# Patient Record
Sex: FEMALE | Race: BLACK OR AFRICAN AMERICAN | NOT HISPANIC OR LATINO | URBAN - METROPOLITAN AREA
[De-identification: names, ages, dates, MRNs, and addresses within clinical notes are randomized per-mention and may not be internally consistent; named-entity substitution may affect disease eponyms.]

---

## 2017-04-04 ENCOUNTER — INPATIENT (INPATIENT)
Facility: HOSPITAL | Age: 62
LOS: 3 days | Discharge: HOME | DRG: 390 | End: 2017-04-08
Attending: SURGERY | Admitting: SURGERY
Payer: MEDICAID

## 2017-04-04 VITALS
TEMPERATURE: 98 F | HEIGHT: 66 IN | WEIGHT: 139.99 LBS | OXYGEN SATURATION: 99 % | HEART RATE: 76 BPM | SYSTOLIC BLOOD PRESSURE: 162 MMHG | DIASTOLIC BLOOD PRESSURE: 90 MMHG | RESPIRATION RATE: 18 BRPM

## 2017-04-04 DIAGNOSIS — Z98.890 OTHER SPECIFIED POSTPROCEDURAL STATES: Chronic | ICD-10-CM

## 2017-04-04 DIAGNOSIS — K56.60 UNSPECIFIED INTESTINAL OBSTRUCTION: ICD-10-CM

## 2017-04-04 DIAGNOSIS — K56.69 OTHER INTESTINAL OBSTRUCTION: ICD-10-CM

## 2017-04-04 LAB
ALBUMIN SERPL ELPH-MCNC: 3.7 G/DL — SIGNIFICANT CHANGE UP (ref 3.5–5)
ALP SERPL-CCNC: 51 U/L — SIGNIFICANT CHANGE UP (ref 40–120)
ALT FLD-CCNC: 20 U/L DA — SIGNIFICANT CHANGE UP (ref 10–60)
ANION GAP SERPL CALC-SCNC: 8 MMOL/L — SIGNIFICANT CHANGE UP (ref 5–17)
APTT BLD: 31 SEC — SIGNIFICANT CHANGE UP (ref 27.5–37.4)
AST SERPL-CCNC: 15 U/L — SIGNIFICANT CHANGE UP (ref 10–40)
BASOPHILS # BLD AUTO: 0.1 K/UL — SIGNIFICANT CHANGE UP (ref 0–0.2)
BASOPHILS NFR BLD AUTO: 1.8 % — SIGNIFICANT CHANGE UP (ref 0–2)
BILIRUB SERPL-MCNC: 0.6 MG/DL — SIGNIFICANT CHANGE UP (ref 0.2–1.2)
BUN SERPL-MCNC: 9 MG/DL — SIGNIFICANT CHANGE UP (ref 7–18)
CALCIUM SERPL-MCNC: 8.9 MG/DL — SIGNIFICANT CHANGE UP (ref 8.4–10.5)
CHLORIDE SERPL-SCNC: 104 MMOL/L — SIGNIFICANT CHANGE UP (ref 96–108)
CO2 SERPL-SCNC: 26 MMOL/L — SIGNIFICANT CHANGE UP (ref 22–31)
CREAT SERPL-MCNC: 0.83 MG/DL — SIGNIFICANT CHANGE UP (ref 0.5–1.3)
EOSINOPHIL # BLD AUTO: 0.1 K/UL — SIGNIFICANT CHANGE UP (ref 0–0.5)
EOSINOPHIL NFR BLD AUTO: 1.7 % — SIGNIFICANT CHANGE UP (ref 0–6)
GLUCOSE SERPL-MCNC: 93 MG/DL — SIGNIFICANT CHANGE UP (ref 70–99)
HCT VFR BLD CALC: 37.5 % — SIGNIFICANT CHANGE UP (ref 34.5–45)
HGB BLD-MCNC: 11.8 G/DL — SIGNIFICANT CHANGE UP (ref 11.5–15.5)
INR BLD: 1.07 RATIO — SIGNIFICANT CHANGE UP (ref 0.88–1.16)
LIDOCAIN IGE QN: 115 U/L — SIGNIFICANT CHANGE UP (ref 73–393)
LYMPHOCYTES # BLD AUTO: 1.8 K/UL — SIGNIFICANT CHANGE UP (ref 1–3.3)
LYMPHOCYTES # BLD AUTO: 29.1 % — SIGNIFICANT CHANGE UP (ref 13–44)
MCHC RBC-ENTMCNC: 28.4 PG — SIGNIFICANT CHANGE UP (ref 27–34)
MCHC RBC-ENTMCNC: 31.6 GM/DL — LOW (ref 32–36)
MCV RBC AUTO: 90.1 FL — SIGNIFICANT CHANGE UP (ref 80–100)
MONOCYTES # BLD AUTO: 0.7 K/UL — SIGNIFICANT CHANGE UP (ref 0–0.9)
MONOCYTES NFR BLD AUTO: 11.5 % — SIGNIFICANT CHANGE UP (ref 2–14)
NEUTROPHILS # BLD AUTO: 3.5 K/UL — SIGNIFICANT CHANGE UP (ref 1.8–7.4)
NEUTROPHILS NFR BLD AUTO: 55.9 % — SIGNIFICANT CHANGE UP (ref 43–77)
PLATELET # BLD AUTO: 417 K/UL — HIGH (ref 150–400)
POTASSIUM SERPL-MCNC: 4.4 MMOL/L — SIGNIFICANT CHANGE UP (ref 3.5–5.3)
POTASSIUM SERPL-SCNC: 4.4 MMOL/L — SIGNIFICANT CHANGE UP (ref 3.5–5.3)
PROT SERPL-MCNC: 7.9 G/DL — SIGNIFICANT CHANGE UP (ref 6–8.3)
PROTHROM AB SERPL-ACNC: 11.7 SEC — SIGNIFICANT CHANGE UP (ref 9.8–12.7)
RBC # BLD: 4.16 M/UL — SIGNIFICANT CHANGE UP (ref 3.8–5.2)
RBC # FLD: 14.7 % — HIGH (ref 10.3–14.5)
SODIUM SERPL-SCNC: 138 MMOL/L — SIGNIFICANT CHANGE UP (ref 135–145)
WBC # BLD: 6.2 K/UL — SIGNIFICANT CHANGE UP (ref 3.8–10.5)
WBC # FLD AUTO: 6.2 K/UL — SIGNIFICANT CHANGE UP (ref 3.8–10.5)

## 2017-04-04 PROCEDURE — 74176 CT ABD & PELVIS W/O CONTRAST: CPT | Mod: 26

## 2017-04-04 PROCEDURE — 71010: CPT | Mod: 26

## 2017-04-04 PROCEDURE — 99285 EMERGENCY DEPT VISIT HI MDM: CPT

## 2017-04-04 RX ORDER — ONDANSETRON 8 MG/1
4 TABLET, FILM COATED ORAL EVERY 6 HOURS
Qty: 0 | Refills: 0 | Status: DISCONTINUED | OUTPATIENT
Start: 2017-04-04 | End: 2017-04-08

## 2017-04-04 RX ORDER — SODIUM CHLORIDE 9 MG/ML
1000 INJECTION, SOLUTION INTRAVENOUS
Qty: 0 | Refills: 0 | Status: DISCONTINUED | OUTPATIENT
Start: 2017-04-04 | End: 2017-04-08

## 2017-04-04 RX ORDER — SODIUM CHLORIDE 9 MG/ML
3 INJECTION INTRAMUSCULAR; INTRAVENOUS; SUBCUTANEOUS ONCE
Qty: 0 | Refills: 0 | Status: COMPLETED | OUTPATIENT
Start: 2017-04-04 | End: 2017-04-04

## 2017-04-04 RX ORDER — SODIUM CHLORIDE 9 MG/ML
1000 INJECTION INTRAMUSCULAR; INTRAVENOUS; SUBCUTANEOUS ONCE
Qty: 0 | Refills: 0 | Status: COMPLETED | OUTPATIENT
Start: 2017-04-04 | End: 2017-04-04

## 2017-04-04 RX ORDER — HEPARIN SODIUM 5000 [USP'U]/ML
5000 INJECTION INTRAVENOUS; SUBCUTANEOUS EVERY 8 HOURS
Qty: 0 | Refills: 0 | Status: DISCONTINUED | OUTPATIENT
Start: 2017-04-04 | End: 2017-04-08

## 2017-04-04 RX ORDER — ONDANSETRON 8 MG/1
4 TABLET, FILM COATED ORAL ONCE
Qty: 0 | Refills: 0 | Status: COMPLETED | OUTPATIENT
Start: 2017-04-04 | End: 2017-04-04

## 2017-04-04 RX ORDER — PANTOPRAZOLE SODIUM 20 MG/1
40 TABLET, DELAYED RELEASE ORAL DAILY
Qty: 0 | Refills: 0 | Status: DISCONTINUED | OUTPATIENT
Start: 2017-04-04 | End: 2017-04-08

## 2017-04-04 RX ADMIN — SODIUM CHLORIDE 3 MILLILITER(S): 9 INJECTION INTRAMUSCULAR; INTRAVENOUS; SUBCUTANEOUS at 18:42

## 2017-04-04 RX ADMIN — SODIUM CHLORIDE 1000 MILLILITER(S): 9 INJECTION INTRAMUSCULAR; INTRAVENOUS; SUBCUTANEOUS at 20:05

## 2017-04-04 RX ADMIN — ONDANSETRON 4 MILLIGRAM(S): 8 TABLET, FILM COATED ORAL at 20:03

## 2017-04-04 NOTE — ED PROVIDER NOTE - NS ED MD SCRIBE ATTENDING SCRIBE SECTIONS
HIV/PAST MEDICAL/SURGICAL/SOCIAL HISTORY/HISTORY OF PRESENT ILLNESS/VITAL SIGNS( Pullset)/PHYSICAL EXAM/REVIEW OF SYSTEMS/DISPOSITION

## 2017-04-04 NOTE — ED ADULT TRIAGE NOTE - CHIEF COMPLAINT QUOTE
biba ambulatory sent from pmd office for abd pain/ distention , pt reports nausea this morning . reports h/o SBO  2 weeks ago was admitted to [facility]

## 2017-04-04 NOTE — ED PROVIDER NOTE - OBJECTIVE STATEMENT
60 y/o F pt with PMHx of HTN (on medication) AND SBO (x4) and PSHx of Bowel/Intestinal Surgeries (multiple surgeries) BIBA to ED with abd pain x2 weeks and nausea since today. Pt visited her PMD today, and was sent to the ED based on her sx's. Pt also reports she is passing gas, and passed stool yesterday (although less than usual). Pt states the pain feels more like gas pain, and does not resemble her passed obstructions. Pt denies any other complaints. Pt states her latest SBO was approximately 2 weeks ago, which prompted admission to [facility]. Pt reports she has recently finished the stool softeners she takes. NKDA.

## 2017-04-04 NOTE — ED ADULT NURSE NOTE - OBJECTIVE STATEMENT
Patient sent from PMD for abdominal pain and distention x 2 weeks. Abdomen is non tender,  distended, no guarding or facial grimacing noted. Breathing easy and unlabored, speaking in full sentences, no use of accessory muscles.

## 2017-04-04 NOTE — H&P ADULT. - HISTORY OF PRESENT ILLNESS
62 y/o female with h/o sb resection for obstruction; subsequent multiple sbo tx'd conservatively; recently discharged from [facility] with resolved sbo; h/o HTN, no other PSH  sent by PMD for abd distension today; no f/c/n/v  acknowledges BM yesterday; +flatus  CT done in ED read as Small bowel anastomotic suture and the left abdomen. Relatively   small narrowing in the distal/terminal ileum in the right/mid abdomen   with diffuse upstream small bowel dilatation measuring up to 6.0 cm in   diameter. Unremarkable appendix. A moderate amount of stool in the colon.  No drainable fluid collection or free air. Small but free   fluid in the pelvis and right abdomen. Mild mesenteric edema. Mild   mesenteric swirling, which is nonspecific in the post surgical setting.

## 2017-04-04 NOTE — ED PROVIDER NOTE - MEDICAL DECISION MAKING DETAILS
60 y/o F pt with Hx of SBO and intestinal surgeries sent from PMD to ED with abd pain x2 weeks and nausea since today. Plan for CT Abd with contrast, blood work, and reassess.

## 2017-04-05 LAB
ANION GAP SERPL CALC-SCNC: 7 MMOL/L — SIGNIFICANT CHANGE UP (ref 5–17)
BUN SERPL-MCNC: 9 MG/DL — SIGNIFICANT CHANGE UP (ref 7–18)
CALCIUM SERPL-MCNC: 8.2 MG/DL — LOW (ref 8.4–10.5)
CHLORIDE SERPL-SCNC: 108 MMOL/L — SIGNIFICANT CHANGE UP (ref 96–108)
CO2 SERPL-SCNC: 27 MMOL/L — SIGNIFICANT CHANGE UP (ref 22–31)
CREAT SERPL-MCNC: 0.75 MG/DL — SIGNIFICANT CHANGE UP (ref 0.5–1.3)
GLUCOSE SERPL-MCNC: 95 MG/DL — SIGNIFICANT CHANGE UP (ref 70–99)
HCT VFR BLD CALC: 35.6 % — SIGNIFICANT CHANGE UP (ref 34.5–45)
HGB BLD-MCNC: 11.4 G/DL — LOW (ref 11.5–15.5)
LACTATE SERPL-SCNC: 0.5 MMOL/L — LOW (ref 0.7–2)
MCHC RBC-ENTMCNC: 29 PG — SIGNIFICANT CHANGE UP (ref 27–34)
MCHC RBC-ENTMCNC: 32.2 GM/DL — SIGNIFICANT CHANGE UP (ref 32–36)
MCV RBC AUTO: 90.2 FL — SIGNIFICANT CHANGE UP (ref 80–100)
PLATELET # BLD AUTO: 353 K/UL — SIGNIFICANT CHANGE UP (ref 150–400)
POTASSIUM SERPL-MCNC: 4.1 MMOL/L — SIGNIFICANT CHANGE UP (ref 3.5–5.3)
POTASSIUM SERPL-SCNC: 4.1 MMOL/L — SIGNIFICANT CHANGE UP (ref 3.5–5.3)
RBC # BLD: 3.94 M/UL — SIGNIFICANT CHANGE UP (ref 3.8–5.2)
RBC # FLD: 14.9 % — HIGH (ref 10.3–14.5)
SODIUM SERPL-SCNC: 142 MMOL/L — SIGNIFICANT CHANGE UP (ref 135–145)
WBC # BLD: 4.8 K/UL — SIGNIFICANT CHANGE UP (ref 3.8–10.5)
WBC # FLD AUTO: 4.8 K/UL — SIGNIFICANT CHANGE UP (ref 3.8–10.5)

## 2017-04-05 PROCEDURE — 74020: CPT | Mod: 26

## 2017-04-05 RX ORDER — METOPROLOL TARTRATE 50 MG
2.5 TABLET ORAL EVERY 6 HOURS
Qty: 0 | Refills: 0 | Status: DISCONTINUED | OUTPATIENT
Start: 2017-04-05 | End: 2017-04-05

## 2017-04-05 RX ADMIN — SODIUM CHLORIDE 125 MILLILITER(S): 9 INJECTION, SOLUTION INTRAVENOUS at 09:42

## 2017-04-05 RX ADMIN — HEPARIN SODIUM 5000 UNIT(S): 5000 INJECTION INTRAVENOUS; SUBCUTANEOUS at 14:03

## 2017-04-05 RX ADMIN — PANTOPRAZOLE SODIUM 40 MILLIGRAM(S): 20 TABLET, DELAYED RELEASE ORAL at 12:19

## 2017-04-05 RX ADMIN — Medication 1.25 MILLIGRAM(S): at 23:19

## 2017-04-05 RX ADMIN — HEPARIN SODIUM 5000 UNIT(S): 5000 INJECTION INTRAVENOUS; SUBCUTANEOUS at 06:26

## 2017-04-05 RX ADMIN — Medication 2.5 MILLIGRAM(S): at 06:26

## 2017-04-05 RX ADMIN — Medication 1.25 MILLIGRAM(S): at 17:51

## 2017-04-05 RX ADMIN — Medication 1.25 MILLIGRAM(S): at 12:19

## 2017-04-05 RX ADMIN — HEPARIN SODIUM 5000 UNIT(S): 5000 INJECTION INTRAVENOUS; SUBCUTANEOUS at 21:25

## 2017-04-05 RX ADMIN — SODIUM CHLORIDE 125 MILLILITER(S): 9 INJECTION, SOLUTION INTRAVENOUS at 23:25

## 2017-04-06 LAB
ANION GAP SERPL CALC-SCNC: 4 MMOL/L — LOW (ref 5–17)
BUN SERPL-MCNC: 6 MG/DL — LOW (ref 7–18)
CALCIUM SERPL-MCNC: 8.3 MG/DL — LOW (ref 8.4–10.5)
CHLORIDE SERPL-SCNC: 109 MMOL/L — HIGH (ref 96–108)
CO2 SERPL-SCNC: 27 MMOL/L — SIGNIFICANT CHANGE UP (ref 22–31)
CREAT SERPL-MCNC: 0.75 MG/DL — SIGNIFICANT CHANGE UP (ref 0.5–1.3)
GLUCOSE SERPL-MCNC: 99 MG/DL — SIGNIFICANT CHANGE UP (ref 70–99)
HCT VFR BLD CALC: 34.8 % — SIGNIFICANT CHANGE UP (ref 34.5–45)
HGB BLD-MCNC: 11.1 G/DL — LOW (ref 11.5–15.5)
MCHC RBC-ENTMCNC: 29 PG — SIGNIFICANT CHANGE UP (ref 27–34)
MCHC RBC-ENTMCNC: 32 GM/DL — SIGNIFICANT CHANGE UP (ref 32–36)
MCV RBC AUTO: 90.7 FL — SIGNIFICANT CHANGE UP (ref 80–100)
PLATELET # BLD AUTO: 342 K/UL — SIGNIFICANT CHANGE UP (ref 150–400)
POTASSIUM SERPL-MCNC: 3.8 MMOL/L — SIGNIFICANT CHANGE UP (ref 3.5–5.3)
POTASSIUM SERPL-SCNC: 3.8 MMOL/L — SIGNIFICANT CHANGE UP (ref 3.5–5.3)
RBC # BLD: 3.84 M/UL — SIGNIFICANT CHANGE UP (ref 3.8–5.2)
RBC # FLD: 14.4 % — SIGNIFICANT CHANGE UP (ref 10.3–14.5)
SODIUM SERPL-SCNC: 140 MMOL/L — SIGNIFICANT CHANGE UP (ref 135–145)
WBC # BLD: 4.5 K/UL — SIGNIFICANT CHANGE UP (ref 3.8–10.5)
WBC # FLD AUTO: 4.5 K/UL — SIGNIFICANT CHANGE UP (ref 3.8–10.5)

## 2017-04-06 PROCEDURE — 74250 X-RAY XM SM INT 1CNTRST STD: CPT | Mod: 26

## 2017-04-06 RX ADMIN — Medication 1.25 MILLIGRAM(S): at 05:22

## 2017-04-06 RX ADMIN — HEPARIN SODIUM 5000 UNIT(S): 5000 INJECTION INTRAVENOUS; SUBCUTANEOUS at 21:10

## 2017-04-06 RX ADMIN — Medication 1.25 MILLIGRAM(S): at 17:59

## 2017-04-06 RX ADMIN — SODIUM CHLORIDE 125 MILLILITER(S): 9 INJECTION, SOLUTION INTRAVENOUS at 17:59

## 2017-04-06 RX ADMIN — Medication 1.25 MILLIGRAM(S): at 23:37

## 2017-04-06 RX ADMIN — HEPARIN SODIUM 5000 UNIT(S): 5000 INJECTION INTRAVENOUS; SUBCUTANEOUS at 05:22

## 2017-04-07 RX ORDER — POLYETHYLENE GLYCOL 3350 17 G/17G
17 POWDER, FOR SOLUTION ORAL DAILY
Qty: 0 | Refills: 0 | Status: DISCONTINUED | OUTPATIENT
Start: 2017-04-07 | End: 2017-04-08

## 2017-04-07 RX ADMIN — PANTOPRAZOLE SODIUM 40 MILLIGRAM(S): 20 TABLET, DELAYED RELEASE ORAL at 12:19

## 2017-04-07 RX ADMIN — HEPARIN SODIUM 5000 UNIT(S): 5000 INJECTION INTRAVENOUS; SUBCUTANEOUS at 05:06

## 2017-04-07 RX ADMIN — Medication 1.25 MILLIGRAM(S): at 12:19

## 2017-04-07 RX ADMIN — Medication 1.25 MILLIGRAM(S): at 05:06

## 2017-04-07 RX ADMIN — Medication 1.25 MILLIGRAM(S): at 17:14

## 2017-04-07 RX ADMIN — SODIUM CHLORIDE 125 MILLILITER(S): 9 INJECTION, SOLUTION INTRAVENOUS at 03:00

## 2017-04-07 RX ADMIN — HEPARIN SODIUM 5000 UNIT(S): 5000 INJECTION INTRAVENOUS; SUBCUTANEOUS at 21:09

## 2017-04-07 RX ADMIN — HEPARIN SODIUM 5000 UNIT(S): 5000 INJECTION INTRAVENOUS; SUBCUTANEOUS at 14:38

## 2017-04-08 VITALS
TEMPERATURE: 98 F | SYSTOLIC BLOOD PRESSURE: 141 MMHG | DIASTOLIC BLOOD PRESSURE: 94 MMHG | HEART RATE: 86 BPM | RESPIRATION RATE: 16 BRPM | OXYGEN SATURATION: 100 %

## 2017-04-08 PROCEDURE — 96374 THER/PROPH/DIAG INJ IV PUSH: CPT

## 2017-04-08 PROCEDURE — 83605 ASSAY OF LACTIC ACID: CPT

## 2017-04-08 PROCEDURE — 80048 BASIC METABOLIC PNL TOTAL CA: CPT

## 2017-04-08 PROCEDURE — 74020: CPT

## 2017-04-08 PROCEDURE — 71045 X-RAY EXAM CHEST 1 VIEW: CPT

## 2017-04-08 PROCEDURE — 99285 EMERGENCY DEPT VISIT HI MDM: CPT | Mod: 25

## 2017-04-08 PROCEDURE — 85610 PROTHROMBIN TIME: CPT

## 2017-04-08 PROCEDURE — 74176 CT ABD & PELVIS W/O CONTRAST: CPT

## 2017-04-08 PROCEDURE — 85730 THROMBOPLASTIN TIME PARTIAL: CPT

## 2017-04-08 PROCEDURE — 83690 ASSAY OF LIPASE: CPT

## 2017-04-08 PROCEDURE — 80053 COMPREHEN METABOLIC PANEL: CPT

## 2017-04-08 PROCEDURE — 93005 ELECTROCARDIOGRAM TRACING: CPT

## 2017-04-08 PROCEDURE — 74250 X-RAY XM SM INT 1CNTRST STD: CPT

## 2017-04-08 PROCEDURE — 85027 COMPLETE CBC AUTOMATED: CPT

## 2017-04-08 PROCEDURE — 93306 TTE W/DOPPLER COMPLETE: CPT

## 2017-04-08 RX ADMIN — POLYETHYLENE GLYCOL 3350 17 GRAM(S): 17 POWDER, FOR SOLUTION ORAL at 14:26

## 2017-04-08 RX ADMIN — Medication 1.25 MILLIGRAM(S): at 05:08

## 2017-04-08 RX ADMIN — HEPARIN SODIUM 5000 UNIT(S): 5000 INJECTION INTRAVENOUS; SUBCUTANEOUS at 14:27

## 2017-04-08 RX ADMIN — HEPARIN SODIUM 5000 UNIT(S): 5000 INJECTION INTRAVENOUS; SUBCUTANEOUS at 05:08

## 2017-04-08 RX ADMIN — PANTOPRAZOLE SODIUM 40 MILLIGRAM(S): 20 TABLET, DELAYED RELEASE ORAL at 13:26

## 2017-04-08 RX ADMIN — Medication 1.25 MILLIGRAM(S): at 13:26

## 2017-04-08 NOTE — DISCHARGE NOTE ADULT - PATIENT PORTAL LINK FT
“You can access the [employer] Patient Portal, offered by [facility], by registering with the following website: [email or web address]”

## 2017-04-08 NOTE — DISCHARGE NOTE ADULT - HOSPITAL COURSE
60 y/o female with h/o sb resection for obstruction; subsequent multiple sbo tx'd conservatively; recently discharged from [facility] with resolved sbo; h/o HTN, no other PSH  sent by PMD for abd distension today; no f/c/n/v  acknowledges BM yesterday; +flatus  CT done in ED read as Small bowel anastomotic suture and the left abdomen. Relatively   small narrowing in the distal/terminal ileum in the right/mid abdomen   with diffuse upstream small bowel dilatation measuring up to 6.0 cm in   diameter. Unremarkable appendix. A moderate amount of stool in the colon.  No drainable fluid collection or free air. Small but free   fluid in the pelvis and right abdomen. Mild mesenteric edema. Mild   mesenteric swirling, which is nonspecific in the post surgical setting.   Pt currently comfortable, tolerating diet, voiding, discussed case with attending if pt tolerates a solid can dc this evening.

## 2017-04-08 NOTE — DISCHARGE NOTE ADULT - CARE PLAN
Principal Discharge DX:	Intestinal obstruction, unspecified type  Goal:	tolerating diet, voiding  Instructions for follow-up, activity and diet:	follow up with surgeon

## 2017-04-10 PROBLEM — Z00.00 ENCOUNTER FOR PREVENTIVE HEALTH EXAMINATION: Status: ACTIVE | Noted: 2017-04-10

## 2017-04-13 DIAGNOSIS — K56.60 UNSPECIFIED INTESTINAL OBSTRUCTION: ICD-10-CM

## 2017-04-13 DIAGNOSIS — I10 ESSENTIAL (PRIMARY) HYPERTENSION: ICD-10-CM

## 2017-04-19 ENCOUNTER — APPOINTMENT (OUTPATIENT)
Dept: SURGERY | Facility: CLINIC | Age: 62
End: 2017-04-19

## 2017-04-19 VITALS
BODY MASS INDEX: 20.88 KG/M2 | WEIGHT: 133 LBS | TEMPERATURE: 98 F | DIASTOLIC BLOOD PRESSURE: 78 MMHG | HEART RATE: 67 BPM | SYSTOLIC BLOOD PRESSURE: 130 MMHG | HEIGHT: 67 IN

## 2017-04-19 DIAGNOSIS — Z86.79 PERSONAL HISTORY OF OTHER DISEASES OF THE CIRCULATORY SYSTEM: ICD-10-CM

## 2017-04-19 DIAGNOSIS — Z87.891 PERSONAL HISTORY OF NICOTINE DEPENDENCE: ICD-10-CM

## 2017-04-19 DIAGNOSIS — R14.0 ABDOMINAL DISTENSION (GASEOUS): ICD-10-CM

## 2017-04-19 DIAGNOSIS — Z87.19 PERSONAL HISTORY OF OTHER DISEASES OF THE DIGESTIVE SYSTEM: ICD-10-CM

## 2017-04-19 RX ORDER — LISINOPRIL 40 MG/1
40 TABLET ORAL
Refills: 0 | Status: ACTIVE | COMMUNITY

## 2017-04-19 RX ORDER — AMLODIPINE BESYLATE 5 MG/1
5 TABLET ORAL
Refills: 0 | Status: ACTIVE | COMMUNITY

## 2017-04-19 RX ORDER — FAMOTIDINE 40 MG/1
40 TABLET, FILM COATED ORAL
Refills: 0 | Status: ACTIVE | COMMUNITY

## 2017-04-19 RX ORDER — UBIDECARENONE/VIT E ACET 100MG-5
CAPSULE ORAL
Refills: 0 | Status: ACTIVE | COMMUNITY

## 2017-04-19 RX ORDER — METOPROLOL SUCCINATE 50 MG/1
50 TABLET, EXTENDED RELEASE ORAL
Refills: 0 | Status: ACTIVE | COMMUNITY

## 2017-04-19 RX ORDER — ALENDRONATE SODIUM 70 MG/1
70 TABLET ORAL
Refills: 0 | Status: ACTIVE | COMMUNITY

## 2017-04-19 RX ORDER — PANTOPRAZOLE 40 MG/1
40 TABLET, DELAYED RELEASE ORAL
Refills: 0 | Status: ACTIVE | COMMUNITY

## 2017-04-19 RX ORDER — LINACLOTIDE 145 UG/1
145 CAPSULE, GELATIN COATED ORAL
Refills: 0 | Status: ACTIVE | COMMUNITY

## 2017-05-10 ENCOUNTER — APPOINTMENT (OUTPATIENT)
Dept: SURGERY | Facility: CLINIC | Age: 62
End: 2017-05-10

## 2018-02-08 ENCOUNTER — INPATIENT (INPATIENT)
Facility: HOSPITAL | Age: 63
LOS: 14 days | Discharge: HOME | DRG: 335 | End: 2018-02-23
Attending: SPECIALIST | Admitting: SPECIALIST
Payer: MEDICAID

## 2018-02-08 VITALS
OXYGEN SATURATION: 98 % | HEART RATE: 104 BPM | SYSTOLIC BLOOD PRESSURE: 142 MMHG | HEIGHT: 66 IN | WEIGHT: 132.06 LBS | RESPIRATION RATE: 19 BRPM | TEMPERATURE: 98 F | DIASTOLIC BLOOD PRESSURE: 88 MMHG

## 2018-02-08 DIAGNOSIS — Z98.890 OTHER SPECIFIED POSTPROCEDURAL STATES: Chronic | ICD-10-CM

## 2018-02-08 DIAGNOSIS — K56.609 UNSPECIFIED INTESTINAL OBSTRUCTION, UNSPECIFIED AS TO PARTIAL VERSUS COMPLETE OBSTRUCTION: ICD-10-CM

## 2018-02-08 LAB
ALBUMIN SERPL ELPH-MCNC: 3.5 G/DL — SIGNIFICANT CHANGE UP (ref 3.5–5)
ALP SERPL-CCNC: 89 U/L — SIGNIFICANT CHANGE UP (ref 40–120)
ALT FLD-CCNC: 35 U/L DA — SIGNIFICANT CHANGE UP (ref 10–60)
ANION GAP SERPL CALC-SCNC: 9 MMOL/L — SIGNIFICANT CHANGE UP (ref 5–17)
APPEARANCE UR: CLEAR — SIGNIFICANT CHANGE UP
APTT BLD: 27.8 SEC — SIGNIFICANT CHANGE UP (ref 27.5–37.4)
AST SERPL-CCNC: 28 U/L — SIGNIFICANT CHANGE UP (ref 10–40)
BACTERIA # UR AUTO: ABNORMAL /HPF
BASOPHILS # BLD AUTO: 0.1 K/UL — SIGNIFICANT CHANGE UP (ref 0–0.2)
BASOPHILS NFR BLD AUTO: 0.9 % — SIGNIFICANT CHANGE UP (ref 0–2)
BILIRUB SERPL-MCNC: 0.7 MG/DL — SIGNIFICANT CHANGE UP (ref 0.2–1.2)
BILIRUB UR-MCNC: ABNORMAL
BLD GP AB SCN SERPL QL: SIGNIFICANT CHANGE UP
BUN SERPL-MCNC: 21 MG/DL — HIGH (ref 7–18)
CALCIUM SERPL-MCNC: 9 MG/DL — SIGNIFICANT CHANGE UP (ref 8.4–10.5)
CHLORIDE SERPL-SCNC: 102 MMOL/L — SIGNIFICANT CHANGE UP (ref 96–108)
CO2 SERPL-SCNC: 25 MMOL/L — SIGNIFICANT CHANGE UP (ref 22–31)
COD CRY URNS QL: ABNORMAL /HPF
COLOR SPEC: YELLOW — SIGNIFICANT CHANGE UP
CREAT SERPL-MCNC: 0.98 MG/DL — SIGNIFICANT CHANGE UP (ref 0.5–1.3)
DIFF PNL FLD: NEGATIVE — SIGNIFICANT CHANGE UP
EOSINOPHIL # BLD AUTO: 0 K/UL — SIGNIFICANT CHANGE UP (ref 0–0.5)
EOSINOPHIL NFR BLD AUTO: 0.2 % — SIGNIFICANT CHANGE UP (ref 0–6)
EPI CELLS # UR: SIGNIFICANT CHANGE UP /HPF
GLUCOSE SERPL-MCNC: 98 MG/DL — SIGNIFICANT CHANGE UP (ref 70–99)
GLUCOSE UR QL: NEGATIVE — SIGNIFICANT CHANGE UP
HCT VFR BLD CALC: 38.6 % — SIGNIFICANT CHANGE UP (ref 34.5–45)
HGB BLD-MCNC: 11.2 G/DL — LOW (ref 11.5–15.5)
INR BLD: 1.11 RATIO — SIGNIFICANT CHANGE UP (ref 0.88–1.16)
KETONES UR-MCNC: ABNORMAL
LEUKOCYTE ESTERASE UR-ACNC: NEGATIVE — SIGNIFICANT CHANGE UP
LIDOCAIN IGE QN: 61 U/L — LOW (ref 73–393)
LYMPHOCYTES # BLD AUTO: 1 K/UL — SIGNIFICANT CHANGE UP (ref 1–3.3)
LYMPHOCYTES # BLD AUTO: 17.3 % — SIGNIFICANT CHANGE UP (ref 13–44)
MCHC RBC-ENTMCNC: 25.4 PG — LOW (ref 27–34)
MCHC RBC-ENTMCNC: 29 GM/DL — LOW (ref 32–36)
MCV RBC AUTO: 87.6 FL — SIGNIFICANT CHANGE UP (ref 80–100)
MONOCYTES # BLD AUTO: 0.4 K/UL — SIGNIFICANT CHANGE UP (ref 0–0.9)
MONOCYTES NFR BLD AUTO: 7.1 % — SIGNIFICANT CHANGE UP (ref 2–14)
NEUTROPHILS # BLD AUTO: 4.5 K/UL — SIGNIFICANT CHANGE UP (ref 1.8–7.4)
NEUTROPHILS NFR BLD AUTO: 74.5 % — SIGNIFICANT CHANGE UP (ref 43–77)
NITRITE UR-MCNC: NEGATIVE — SIGNIFICANT CHANGE UP
PH UR: 5 — SIGNIFICANT CHANGE UP (ref 5–8)
PLATELET # BLD AUTO: 413 K/UL — HIGH (ref 150–400)
POTASSIUM SERPL-MCNC: 4 MMOL/L — SIGNIFICANT CHANGE UP (ref 3.5–5.3)
POTASSIUM SERPL-SCNC: 4 MMOL/L — SIGNIFICANT CHANGE UP (ref 3.5–5.3)
PROT SERPL-MCNC: 8.3 G/DL — SIGNIFICANT CHANGE UP (ref 6–8.3)
PROT UR-MCNC: 30 MG/DL
PROTHROM AB SERPL-ACNC: 12.1 SEC — SIGNIFICANT CHANGE UP (ref 9.8–12.7)
RBC # BLD: 4.41 M/UL — SIGNIFICANT CHANGE UP (ref 3.8–5.2)
RBC # FLD: 17.4 % — HIGH (ref 10.3–14.5)
RBC CASTS # UR COMP ASSIST: NEGATIVE /HPF — SIGNIFICANT CHANGE UP (ref 0–2)
SODIUM SERPL-SCNC: 136 MMOL/L — SIGNIFICANT CHANGE UP (ref 135–145)
SP GR SPEC: 1.02 — SIGNIFICANT CHANGE UP (ref 1.01–1.02)
UROBILINOGEN FLD QL: 1
WBC # BLD: 6 K/UL — SIGNIFICANT CHANGE UP (ref 3.8–10.5)
WBC # FLD AUTO: 6 K/UL — SIGNIFICANT CHANGE UP (ref 3.8–10.5)
WBC UR QL: SIGNIFICANT CHANGE UP /HPF (ref 0–5)

## 2018-02-08 PROCEDURE — 74019 RADEX ABDOMEN 2 VIEWS: CPT | Mod: 26

## 2018-02-08 PROCEDURE — 99223 1ST HOSP IP/OBS HIGH 75: CPT

## 2018-02-08 PROCEDURE — 71045 X-RAY EXAM CHEST 1 VIEW: CPT | Mod: 26

## 2018-02-08 PROCEDURE — 99285 EMERGENCY DEPT VISIT HI MDM: CPT

## 2018-02-08 PROCEDURE — 74177 CT ABD & PELVIS W/CONTRAST: CPT | Mod: 26

## 2018-02-08 RX ORDER — SODIUM CHLORIDE 9 MG/ML
3 INJECTION INTRAMUSCULAR; INTRAVENOUS; SUBCUTANEOUS ONCE
Qty: 0 | Refills: 0 | Status: COMPLETED | OUTPATIENT
Start: 2018-02-08 | End: 2018-02-08

## 2018-02-08 RX ORDER — ONDANSETRON 8 MG/1
4 TABLET, FILM COATED ORAL EVERY 6 HOURS
Qty: 0 | Refills: 0 | Status: DISCONTINUED | OUTPATIENT
Start: 2018-02-08 | End: 2018-02-12

## 2018-02-08 RX ORDER — ACETAMINOPHEN 500 MG
650 TABLET ORAL EVERY 6 HOURS
Qty: 0 | Refills: 0 | Status: DISCONTINUED | OUTPATIENT
Start: 2018-02-08 | End: 2018-02-12

## 2018-02-08 RX ORDER — ONDANSETRON 8 MG/1
4 TABLET, FILM COATED ORAL ONCE
Qty: 0 | Refills: 0 | Status: COMPLETED | OUTPATIENT
Start: 2018-02-08 | End: 2018-02-08

## 2018-02-08 RX ORDER — SODIUM CHLORIDE 9 MG/ML
1000 INJECTION INTRAMUSCULAR; INTRAVENOUS; SUBCUTANEOUS
Qty: 0 | Refills: 0 | Status: DISCONTINUED | OUTPATIENT
Start: 2018-02-08 | End: 2018-02-09

## 2018-02-08 RX ADMIN — SODIUM CHLORIDE 3 MILLILITER(S): 9 INJECTION INTRAMUSCULAR; INTRAVENOUS; SUBCUTANEOUS at 10:21

## 2018-02-08 RX ADMIN — ONDANSETRON 4 MILLIGRAM(S): 8 TABLET, FILM COATED ORAL at 11:16

## 2018-02-08 NOTE — H&P ADULT - HISTORY OF PRESENT ILLNESS
62 y.o. F w/ PMH multiple SBO s/p SB resection in 2015 presents to [facility] ER c/o vomiting since last night. Pt states her last meal was lunch time, consisting of corn beef and rice. Associated with only mild upper abdominal discomfort. Last BM 2 days ago, which is abnormal for pt as she usually has daily BM's. Last [facility] admission April 2017 for SBO, which resolved with conservative management. Denies fever, chills, diarrhea, dysuria. Last colonoscopy last year, which pt states was normal.

## 2018-02-08 NOTE — ED PROVIDER NOTE - OBJECTIVE STATEMENT
63 y/o F pt with PMHx of HTN, SBO, presents to ED c/o nausea, vomiting, decreased appetite, and abd distension x 2 weeks. Pt notes she has a H/O SBO which new episode feels similar to previous SBO. Pt denies fever, chills, shortness of breath, cough, chest pain, palpitations, dysuria, urinary frequency, hematuria, or any other complaints. NKDA.

## 2018-02-08 NOTE — ED ADULT NURSE NOTE - OBJECTIVE STATEMENT
Pt reports generalized  abd pain nausea and vomiting for 2 days also reports burning sensation while urinating denies fever/ chills constipation

## 2018-02-08 NOTE — H&P ADULT - PROBLEM SELECTOR PLAN 1
Admit to surgery under Dr. Guerrero  conservative management:  NGT to LWS  IVF  Zofran prn nausea  DVT ppx  observation  abd x-ray in AM

## 2018-02-08 NOTE — ED PROVIDER NOTE - MEDICAL DECISION MAKING DETAILS
61 y/o F pt with H/O SBO with abd distension, decreased appetite and vomiting. Will obtain labs, x-ray, give IVF and reassess

## 2018-02-08 NOTE — H&P ADULT - GASTROINTESTINAL COMMENTS
Abd distended. Midline scar noted, skin well approximated. No guarding, rigidity, rebound tenderness

## 2018-02-09 LAB
ALBUMIN SERPL ELPH-MCNC: 3 G/DL — LOW (ref 3.5–5)
ALP SERPL-CCNC: 79 U/L — SIGNIFICANT CHANGE UP (ref 40–120)
ALT FLD-CCNC: 27 U/L DA — SIGNIFICANT CHANGE UP (ref 10–60)
ANION GAP SERPL CALC-SCNC: 9 MMOL/L — SIGNIFICANT CHANGE UP (ref 5–17)
AST SERPL-CCNC: 20 U/L — SIGNIFICANT CHANGE UP (ref 10–40)
BILIRUB SERPL-MCNC: 0.6 MG/DL — SIGNIFICANT CHANGE UP (ref 0.2–1.2)
BUN SERPL-MCNC: 22 MG/DL — HIGH (ref 7–18)
CALCIUM SERPL-MCNC: 8 MG/DL — LOW (ref 8.4–10.5)
CHLORIDE SERPL-SCNC: 103 MMOL/L — SIGNIFICANT CHANGE UP (ref 96–108)
CO2 SERPL-SCNC: 28 MMOL/L — SIGNIFICANT CHANGE UP (ref 22–31)
CREAT SERPL-MCNC: 0.78 MG/DL — SIGNIFICANT CHANGE UP (ref 0.5–1.3)
GLUCOSE SERPL-MCNC: 81 MG/DL — SIGNIFICANT CHANGE UP (ref 70–99)
HCT VFR BLD CALC: 33.9 % — LOW (ref 34.5–45)
HGB BLD-MCNC: 10.1 G/DL — LOW (ref 11.5–15.5)
LACTATE SERPL-SCNC: 0.9 MMOL/L — SIGNIFICANT CHANGE UP (ref 0.7–2)
MAGNESIUM SERPL-MCNC: 2.1 MG/DL — SIGNIFICANT CHANGE UP (ref 1.6–2.6)
MCHC RBC-ENTMCNC: 25.7 PG — LOW (ref 27–34)
MCHC RBC-ENTMCNC: 29.8 GM/DL — LOW (ref 32–36)
MCV RBC AUTO: 86.4 FL — SIGNIFICANT CHANGE UP (ref 80–100)
PHOSPHATE SERPL-MCNC: 3.8 MG/DL — SIGNIFICANT CHANGE UP (ref 2.5–4.5)
PLATELET # BLD AUTO: 374 K/UL — SIGNIFICANT CHANGE UP (ref 150–400)
POTASSIUM SERPL-MCNC: 3.4 MMOL/L — LOW (ref 3.5–5.3)
POTASSIUM SERPL-SCNC: 3.4 MMOL/L — LOW (ref 3.5–5.3)
PROT SERPL-MCNC: 7.1 G/DL — SIGNIFICANT CHANGE UP (ref 6–8.3)
RBC # BLD: 3.92 M/UL — SIGNIFICANT CHANGE UP (ref 3.8–5.2)
RBC # FLD: 17.8 % — HIGH (ref 10.3–14.5)
SODIUM SERPL-SCNC: 140 MMOL/L — SIGNIFICANT CHANGE UP (ref 135–145)
WBC # BLD: 6.4 K/UL — SIGNIFICANT CHANGE UP (ref 3.8–10.5)
WBC # FLD AUTO: 6.4 K/UL — SIGNIFICANT CHANGE UP (ref 3.8–10.5)

## 2018-02-09 PROCEDURE — 74018 RADEX ABDOMEN 1 VIEW: CPT | Mod: 26

## 2018-02-09 RX ORDER — HEPARIN SODIUM 5000 [USP'U]/ML
5000 INJECTION INTRAVENOUS; SUBCUTANEOUS EVERY 8 HOURS
Qty: 0 | Refills: 0 | Status: DISCONTINUED | OUTPATIENT
Start: 2018-02-09 | End: 2018-02-12

## 2018-02-09 RX ORDER — KETOROLAC TROMETHAMINE 30 MG/ML
30 SYRINGE (ML) INJECTION ONCE
Qty: 0 | Refills: 0 | Status: DISCONTINUED | OUTPATIENT
Start: 2018-02-09 | End: 2018-02-09

## 2018-02-09 RX ORDER — SODIUM CHLORIDE 9 MG/ML
1000 INJECTION, SOLUTION INTRAVENOUS
Qty: 0 | Refills: 0 | Status: DISCONTINUED | OUTPATIENT
Start: 2018-02-09 | End: 2018-02-11

## 2018-02-09 RX ADMIN — SODIUM CHLORIDE 125 MILLILITER(S): 9 INJECTION, SOLUTION INTRAVENOUS at 12:32

## 2018-02-09 RX ADMIN — HEPARIN SODIUM 5000 UNIT(S): 5000 INJECTION INTRAVENOUS; SUBCUTANEOUS at 21:21

## 2018-02-09 RX ADMIN — Medication 30 MILLIGRAM(S): at 06:00

## 2018-02-09 RX ADMIN — Medication 30 MILLIGRAM(S): at 06:28

## 2018-02-09 RX ADMIN — HEPARIN SODIUM 5000 UNIT(S): 5000 INJECTION INTRAVENOUS; SUBCUTANEOUS at 13:39

## 2018-02-09 NOTE — PROGRESS NOTE ADULT - SUBJECTIVE AND OBJECTIVE BOX
61 y/o female admitted with SBO. Patient examined at bedside, no complaints.   No nausea, no vomiting  Denies flatus or BM. ABD pain improved     T(F): 98.8 (02-09-18 @ 06:29), Max: 98.8 (02-09-18 @ 06:29)  HR: 85 (02-09-18 @ 06:29) (79 - 104)  BP: 134/81 (02-09-18 @ 06:29) (134/81 - 147/105)  RR: 16 (02-09-18 @ 06:29) (16 - 20)  SpO2: 100% (02-09-18 @ 06:29) (98% - 100%)  Wt(kg): --      02-08 @ 07:01  -  02-09 @ 07:00  --------------------------------------------------------  IN:  Total IN: 0 mL    OUT:    Nasoenteral Tube: 900 mL  Total OUT: 900 mL    Total NET: -900 mL                          11.2   6.0   )-----------( 413      ( 08 Feb 2018 10:43 )             38.6   02-08    136  |  102  |  21<H>  ----------------------------<  98  4.0   |  25  |  0.98    Ca    9.0      08 Feb 2018 10:43    TPro  8.3  /  Alb  3.5  /  TBili  0.7  /  DBili  x   /  AST  28  /  ALT  35  /  AlkPhos  89  02-08            Physical Exam  General: AAOx3, No acute distress, NGT in place  Skin: No jaundice, no icterus  Abdomen:  nontender, distended, tympanic to percussion, no rebound tenderness, no guarding, no palpable masses  : Normal external genitalia  Extremities: non edematous, no calf pain bilaterally

## 2018-02-09 NOTE — PROGRESS NOTE ADULT - ATTENDING COMMENTS
As above, Pt with SBO, s/p multiple laparotomies including possible R colon resection  No comfortable, ambulating no c/o pain    Afebrile  VSS      Abd  softly distended, tympanitic, less distended than yesterday.  No tenderness, no Flatus or BM              No hernias      WBC   WNL     NGT  900 ml    Imp  SBO likely secondary to adhesions although anastomotic stricture is also possible.  The pt is likely to need surgical decompression but does not want to consider it at this time  Her abdomen is soft, there is no fever or leukocytosis so observation is safe at this time  Will repeat obstructive series in am

## 2018-02-10 LAB
ANION GAP SERPL CALC-SCNC: 6 MMOL/L — SIGNIFICANT CHANGE UP (ref 5–17)
BUN SERPL-MCNC: 24 MG/DL — HIGH (ref 7–18)
CALCIUM SERPL-MCNC: 8.3 MG/DL — LOW (ref 8.4–10.5)
CHLORIDE SERPL-SCNC: 102 MMOL/L — SIGNIFICANT CHANGE UP (ref 96–108)
CO2 SERPL-SCNC: 31 MMOL/L — SIGNIFICANT CHANGE UP (ref 22–31)
CREAT SERPL-MCNC: 0.8 MG/DL — SIGNIFICANT CHANGE UP (ref 0.5–1.3)
GLUCOSE SERPL-MCNC: 126 MG/DL — HIGH (ref 70–99)
HCT VFR BLD CALC: 32.9 % — LOW (ref 34.5–45)
HGB BLD-MCNC: 10.2 G/DL — LOW (ref 11.5–15.5)
MAGNESIUM SERPL-MCNC: 2.4 MG/DL — SIGNIFICANT CHANGE UP (ref 1.6–2.6)
MCHC RBC-ENTMCNC: 27.4 PG — SIGNIFICANT CHANGE UP (ref 27–34)
MCHC RBC-ENTMCNC: 30.9 GM/DL — LOW (ref 32–36)
MCV RBC AUTO: 88.6 FL — SIGNIFICANT CHANGE UP (ref 80–100)
PHOSPHATE SERPL-MCNC: 2.3 MG/DL — LOW (ref 2.5–4.5)
PLATELET # BLD AUTO: 371 K/UL — SIGNIFICANT CHANGE UP (ref 150–400)
POTASSIUM SERPL-MCNC: 3.5 MMOL/L — SIGNIFICANT CHANGE UP (ref 3.5–5.3)
POTASSIUM SERPL-SCNC: 3.5 MMOL/L — SIGNIFICANT CHANGE UP (ref 3.5–5.3)
RBC # BLD: 3.72 M/UL — LOW (ref 3.8–5.2)
RBC # FLD: 18.2 % — HIGH (ref 10.3–14.5)
SODIUM SERPL-SCNC: 139 MMOL/L — SIGNIFICANT CHANGE UP (ref 135–145)
WBC # BLD: 5.1 K/UL — SIGNIFICANT CHANGE UP (ref 3.8–10.5)
WBC # FLD AUTO: 5.1 K/UL — SIGNIFICANT CHANGE UP (ref 3.8–10.5)

## 2018-02-10 RX ORDER — SODIUM CHLORIDE 9 MG/ML
500 INJECTION INTRAMUSCULAR; INTRAVENOUS; SUBCUTANEOUS ONCE
Qty: 0 | Refills: 0 | Status: COMPLETED | OUTPATIENT
Start: 2018-02-10 | End: 2018-02-10

## 2018-02-10 RX ADMIN — SODIUM CHLORIDE 125 MILLILITER(S): 9 INJECTION, SOLUTION INTRAVENOUS at 13:33

## 2018-02-10 RX ADMIN — HEPARIN SODIUM 5000 UNIT(S): 5000 INJECTION INTRAVENOUS; SUBCUTANEOUS at 13:33

## 2018-02-10 RX ADMIN — HEPARIN SODIUM 5000 UNIT(S): 5000 INJECTION INTRAVENOUS; SUBCUTANEOUS at 22:27

## 2018-02-10 RX ADMIN — Medication 1.25 MILLIGRAM(S): at 11:06

## 2018-02-10 RX ADMIN — Medication 1.25 MILLIGRAM(S): at 05:47

## 2018-02-10 RX ADMIN — SODIUM CHLORIDE 166.67 MILLILITER(S): 9 INJECTION INTRAMUSCULAR; INTRAVENOUS; SUBCUTANEOUS at 07:29

## 2018-02-10 RX ADMIN — HEPARIN SODIUM 5000 UNIT(S): 5000 INJECTION INTRAVENOUS; SUBCUTANEOUS at 05:47

## 2018-02-10 NOTE — PROGRESS NOTE ADULT - SUBJECTIVE AND OBJECTIVE BOX
Patient examined at bedside, reports mild abdominal pain, denies flatus or bowel movement   No nausea, NGT in place  PT is npo    T(C): 36.8 (02-10-18 @ 05:45), Max: 37.1 (02-09-18 @ 21:50)  HR: 78 (02-10-18 @ 06:12) (65 - 102)  BP: 120/78 (02-10-18 @ 06:12) (103/57 - 120/78)  RR: 16 (02-10-18 @ 05:45) (16 - 17)  SpO2: 100% (02-10-18 @ 05:45) (99% - 100%)  Wt(kg): --      02-09 @ 07:01  -  02-10 @ 07:00  --------------------------------------------------------  IN: 2950 mL / OUT: 1850 mL / NET: 1100 mL      Physical Exam  General: AAOx3, No acute distress  Skin: No jaundice, no icterus  Abdomen: distended, tympanic, nontender  Extremities: non edematous, no calf pain bilaterally    NGT to suction: 1050                          10.2   5.1   )-----------( 371      ( 10 Feb 2018 07:11 )             32.9   02-10    139  |  102  |  24<H>  ----------------------------<  126<H>  3.5   |  31  |  0.80    Ca    8.3<L>      10 Feb 2018 07:11  Phos  2.3     02-10  Mg     2.4     02-10    TPro  7.1  /  Alb  3.0<L>  /  TBili  0.6  /  DBili  x   /  AST  20  /  ALT  27  /  AlkPhos  79  02-09

## 2018-02-10 NOTE — PROGRESS NOTE ADULT - SUBJECTIVE AND OBJECTIVE BOX
SUBJECTIVE    Nausea [ ] YES [ X] NO  Vomiting [ ] YES [X ] NO  Voiding normally [X ] YES [ ] NO  Flatus [ ] YES X[ ] NO  BM [ ] YES [X] NO       Diarrhea [ ] YES [X ] NO  Pain under control [X ] YES [ ] NO  NPO/NGT  Ambulated [X ] YES NO [ ]      VITALS    ICU Vital Signs Last 24 Hrs  T(C): 36.8 (10 Feb 2018 05:45), Max: 37.1 (09 Feb 2018 21:50)  T(F): 98.3 (10 Feb 2018 05:45), Max: 98.7 (09 Feb 2018 21:50)  HR: 81 (10 Feb 2018 11:00) (65 - 102)  BP: 125/86 (10 Feb 2018 11:00) (103/57 - 125/86)  BP(mean): --  ABP: --  ABP(mean): --  RR: 16 (10 Feb 2018 05:45) (16 - 17)  SpO2: 100% (10 Feb 2018 05:45) (99% - 100%)    I&O's Detail    09 Feb 2018 07:01  -  10 Feb 2018 07:00  --------------------------------------------------------  IN:    dextrose 5% + sodium chloride 0.45%: 2750 mL    sodium chloride 0.9%: 200 mL  Total IN: 2950 mL    OUT:    Nasoenteral Tube: 1050 mL    Voided: 800 mL  Total OUT: 1850 mL    Total NET: 1100 mL      10 Feb 2018 07:01  -  10 Feb 2018 11:08  --------------------------------------------------------  IN:    dextrose 5% + sodium chloride 0.45%: 375 mL  Total IN: 375 mL    OUT:    Nasoenteral Tube: 300 mL  Total OUT: 300 mL    Total NET: 75 mL            PHYSICAL EXAMINATION    Abdomen: soft, distended, NT; well healed mildline scar    I&O's Summary    09 Feb 2018 07:01  -  10 Feb 2018 07:00  --------------------------------------------------------  IN: 2950 mL / OUT: 1850 mL / NET: 1100 mL    10 Feb 2018 07:01  -  10 Feb 2018 11:08  --------------------------------------------------------  IN: 375 mL / OUT: 300 mL / NET: 75 mL        LABS                        10.2   5.1   )-----------( 371      ( 10 Feb 2018 07:11 )             32.9             02-10    139  |  102  |  24<H>  ----------------------------<  126<H>  3.5   |  31  |  0.80    Ca    8.3<L>      10 Feb 2018 07:11  Phos  2.3     02-10  Mg     2.4     02-10    TPro  7.1  /  Alb  3.0<L>  /  TBili  0.6  /  DBili  x   /  AST  20  /  ALT  27  /  AlkPhos  79  02-09    LIVER FUNCTIONS - ( 09 Feb 2018 08:44 )  Alb: 3.0 g/dL / Pro: 7.1 g/dL / ALK PHOS: 79 U/L / ALT: 27 U/L DA / AST: 20 U/L / GGT: x             MEDICATIONS:  MEDICATIONS  (STANDING):  dextrose 5% + sodium chloride 0.45% 1000 milliLiter(s) (125 mL/Hr) IV Continuous <Continuous>  enalaprilat Injectable 1.25 milliGRAM(s) IV Push every 6 hours  heparin  Injectable 5000 Unit(s) SubCutaneous every 8 hours    MEDICATIONS  (PRN):  acetaminophen   Tablet 650 milliGRAM(s) Oral every 6 hours PRN For Temp greater than 38 C (100.4 F)  ondansetron Injectable 4 milliGRAM(s) IV Push every 6 hours PRN Nausea

## 2018-02-11 LAB
ANION GAP SERPL CALC-SCNC: 4 MMOL/L — LOW (ref 5–17)
BUN SERPL-MCNC: 16 MG/DL — SIGNIFICANT CHANGE UP (ref 7–18)
CALCIUM SERPL-MCNC: 8.2 MG/DL — LOW (ref 8.4–10.5)
CHLORIDE SERPL-SCNC: 103 MMOL/L — SIGNIFICANT CHANGE UP (ref 96–108)
CO2 SERPL-SCNC: 34 MMOL/L — HIGH (ref 22–31)
CREAT SERPL-MCNC: 0.7 MG/DL — SIGNIFICANT CHANGE UP (ref 0.5–1.3)
GLUCOSE SERPL-MCNC: 105 MG/DL — HIGH (ref 70–99)
HCT VFR BLD CALC: 33.6 % — LOW (ref 34.5–45)
HGB BLD-MCNC: 9.4 G/DL — LOW (ref 11.5–15.5)
MCHC RBC-ENTMCNC: 25.1 PG — LOW (ref 27–34)
MCHC RBC-ENTMCNC: 28.1 GM/DL — LOW (ref 32–36)
MCV RBC AUTO: 89.2 FL — SIGNIFICANT CHANGE UP (ref 80–100)
PLATELET # BLD AUTO: 352 K/UL — SIGNIFICANT CHANGE UP (ref 150–400)
POTASSIUM SERPL-MCNC: 3.6 MMOL/L — SIGNIFICANT CHANGE UP (ref 3.5–5.3)
POTASSIUM SERPL-SCNC: 3.6 MMOL/L — SIGNIFICANT CHANGE UP (ref 3.5–5.3)
RBC # BLD: 3.76 M/UL — LOW (ref 3.8–5.2)
RBC # FLD: 18.4 % — HIGH (ref 10.3–14.5)
SODIUM SERPL-SCNC: 141 MMOL/L — SIGNIFICANT CHANGE UP (ref 135–145)
WBC # BLD: 5.1 K/UL — SIGNIFICANT CHANGE UP (ref 3.8–10.5)
WBC # FLD AUTO: 5.1 K/UL — SIGNIFICANT CHANGE UP (ref 3.8–10.5)

## 2018-02-11 PROCEDURE — 74018 RADEX ABDOMEN 1 VIEW: CPT | Mod: 26

## 2018-02-11 RX ORDER — DEXTROSE MONOHYDRATE, SODIUM CHLORIDE, AND POTASSIUM CHLORIDE 50; .745; 4.5 G/1000ML; G/1000ML; G/1000ML
1000 INJECTION, SOLUTION INTRAVENOUS
Qty: 0 | Refills: 0 | Status: DISCONTINUED | OUTPATIENT
Start: 2018-02-11 | End: 2018-02-12

## 2018-02-11 RX ORDER — SODIUM CHLORIDE 9 MG/ML
1000 INJECTION INTRAMUSCULAR; INTRAVENOUS; SUBCUTANEOUS ONCE
Qty: 0 | Refills: 0 | Status: COMPLETED | OUTPATIENT
Start: 2018-02-11 | End: 2018-02-11

## 2018-02-11 RX ADMIN — SODIUM CHLORIDE 125 MILLILITER(S): 9 INJECTION, SOLUTION INTRAVENOUS at 01:17

## 2018-02-11 RX ADMIN — DEXTROSE MONOHYDRATE, SODIUM CHLORIDE, AND POTASSIUM CHLORIDE 175 MILLILITER(S): 50; .745; 4.5 INJECTION, SOLUTION INTRAVENOUS at 22:20

## 2018-02-11 RX ADMIN — Medication 1.25 MILLIGRAM(S): at 23:31

## 2018-02-11 RX ADMIN — Medication 1.25 MILLIGRAM(S): at 00:23

## 2018-02-11 RX ADMIN — Medication 1.25 MILLIGRAM(S): at 17:25

## 2018-02-11 RX ADMIN — HEPARIN SODIUM 5000 UNIT(S): 5000 INJECTION INTRAVENOUS; SUBCUTANEOUS at 14:55

## 2018-02-11 RX ADMIN — HEPARIN SODIUM 5000 UNIT(S): 5000 INJECTION INTRAVENOUS; SUBCUTANEOUS at 05:43

## 2018-02-11 RX ADMIN — HEPARIN SODIUM 5000 UNIT(S): 5000 INJECTION INTRAVENOUS; SUBCUTANEOUS at 22:19

## 2018-02-11 RX ADMIN — Medication 1.25 MILLIGRAM(S): at 05:43

## 2018-02-11 RX ADMIN — SODIUM CHLORIDE 500 MILLILITER(S): 9 INJECTION INTRAMUSCULAR; INTRAVENOUS; SUBCUTANEOUS at 17:45

## 2018-02-11 NOTE — DIETITIAN INITIAL EVALUATION ADULT. - OTHER INFO
nutrition assessment for NPO status; lives home with family; skin intact; h/o Vit B12, Vit D deficiency per pt, significant wt loss reported from intermittently decreased intake with GI issue and Anemia per pt, wants to know what to eat to prevent SBO, discussed with pt to defer until medical improved; NPO x 4d in-house, NGT with dark brown suction 2200 ml over past 24h, abdominal distended noted, for possible surgery if non-resolving SBO per surgical team. nutrition assessment for NPO status; lives home with family; skin intact; h/o Vit B12, Vit D deficiency per pt, significant wt loss reported from intermittently decreased intake with GI issue and Anemia per pt, wants to know what to eat to prevent SBO, discussed with pt to defer until medical condition improved; NPO x 4d in-house, NGT with dark brown suction 2200 ml over past 24h, abdominal distended noted, for possible surgery if non-resolving SBO per surgical team.

## 2018-02-11 NOTE — PROGRESS NOTE ADULT - SUBJECTIVE AND OBJECTIVE BOX
SUBJECTIVE    Nausea [ ] YES [X ] NO  Vomiting [ ] YES [X ] NO  Voiding normally [X ] YES [ ] NO  Flatus [ ] YES [X ] NO  BM [ ] YES [X ] NO  Pain under control [X ] YES [ ] NO-no pain right now  NPO/NGT  Ambulated [X ] YES NO [ ]      VITALS    ICU Vital Signs Last 24 Hrs  T(C): 36.7 (11 Feb 2018 05:39), Max: 36.9 (10 Feb 2018 22:09)  T(F): 98.1 (11 Feb 2018 05:39), Max: 98.5 (10 Feb 2018 22:09)  HR: 72 (11 Feb 2018 05:39) (72 - 83)  BP: 112/76 (11 Feb 2018 05:39) (108/74 - 126/84)  BP(mean): --  ABP: --  ABP(mean): --  RR: 18 (11 Feb 2018 05:39) (17 - 18)  SpO2: 98% (11 Feb 2018 05:39) (98% - 100%)    I&O's Detail    10 Feb 2018 07:01  -  11 Feb 2018 07:00  --------------------------------------------------------  IN:    dextrose 5% + sodium chloride 0.45%: 2625 mL  Total IN: 2625 mL    OUT:    Nasoenteral Tube: 2200 mL    Voided: 850 mL  Total OUT: 3050 mL    Total NET: -425 mL            PHYSICAL EXAMINATION    Abdomen: soft (softer than yesterday), distended, NT    I&O's Summary    10 Feb 2018 07:01  -  11 Feb 2018 07:00  --------------------------------------------------------  IN: 2625 mL / OUT: 3050 mL / NET: -425 mL        LABS                        9.4    5.1   )-----------( 352      ( 11 Feb 2018 07:05 )             33.6             02-11    141  |  103  |  16  ----------------------------<  105<H>  3.6   |  34<H>  |  0.70    Ca    8.2<L>      11 Feb 2018 07:05  Phos  2.3     02-10  Mg     2.4     02-10          MEDICATIONS:  MEDICATIONS  (STANDING):  dextrose 5% + sodium chloride 0.45% 1000 milliLiter(s) (125 mL/Hr) IV Continuous <Continuous>  enalaprilat Injectable 1.25 milliGRAM(s) IV Push every 6 hours  heparin  Injectable 5000 Unit(s) SubCutaneous every 8 hours    MEDICATIONS  (PRN):  acetaminophen   Tablet 650 milliGRAM(s) Oral every 6 hours PRN For Temp greater than 38 C (100.4 F)  ondansetron Injectable 4 milliGRAM(s) IV Push every 6 hours PRN Nausea

## 2018-02-11 NOTE — CHART NOTE - NSCHARTNOTEFT_GEN_A_CORE
Upon Nutritional Assessment by the Registered Dietitian your patient was determined to meet criteria / has evidence of the following diagnosis/diagnoses:          [ ]  Mild Protein Calorie Malnutrition        [ ]  Moderate Protein Calorie Malnutrition        [ X ] Severe Protein Calorie Malnutrition        [ ] Unspecified Protein Calorie Malnutrition        [ ] Underweight / BMI <19        [ ] Morbid Obesity / BMI > 40      Findings as based on:  •  Comprehensive nutrition assessment and consultation  •  Calorie counts (nutrient intake analysis)  •  Food acceptance and intake status from observations by staff  •  Follow up  •  Patient education  •  Intervention secondary to interdisciplinary rounds  •   concerns      Treatment:    The following diet has been recommended: Advance diet or consider alternate nutrition support if NPO prolonged further       PROVIDER Section:     By signing this assessment you are acknowledging and agree with the diagnosis/diagnoses assigned by the Registered Dietitian    Comments:

## 2018-02-12 ENCOUNTER — RESULT REVIEW (OUTPATIENT)
Age: 63
End: 2018-02-12

## 2018-02-12 LAB
ALBUMIN SERPL ELPH-MCNC: 2.6 G/DL — LOW (ref 3.5–5)
ALP SERPL-CCNC: 69 U/L — SIGNIFICANT CHANGE UP (ref 40–120)
ALT FLD-CCNC: 23 U/L DA — SIGNIFICANT CHANGE UP (ref 10–60)
ANION GAP SERPL CALC-SCNC: 4 MMOL/L — LOW (ref 5–17)
AST SERPL-CCNC: 17 U/L — SIGNIFICANT CHANGE UP (ref 10–40)
BILIRUB SERPL-MCNC: 0.4 MG/DL — SIGNIFICANT CHANGE UP (ref 0.2–1.2)
BUN SERPL-MCNC: 12 MG/DL — SIGNIFICANT CHANGE UP (ref 7–18)
CALCIUM SERPL-MCNC: 8.2 MG/DL — LOW (ref 8.4–10.5)
CHLORIDE SERPL-SCNC: 102 MMOL/L — SIGNIFICANT CHANGE UP (ref 96–108)
CO2 SERPL-SCNC: 34 MMOL/L — HIGH (ref 22–31)
CREAT SERPL-MCNC: 0.74 MG/DL — SIGNIFICANT CHANGE UP (ref 0.5–1.3)
GLUCOSE SERPL-MCNC: 108 MG/DL — HIGH (ref 70–99)
HCT VFR BLD CALC: 31.6 % — LOW (ref 34.5–45)
HGB BLD-MCNC: 9.7 G/DL — LOW (ref 11.5–15.5)
MCHC RBC-ENTMCNC: 27.2 PG — SIGNIFICANT CHANGE UP (ref 27–34)
MCHC RBC-ENTMCNC: 30.7 GM/DL — LOW (ref 32–36)
MCV RBC AUTO: 88.6 FL — SIGNIFICANT CHANGE UP (ref 80–100)
PLATELET # BLD AUTO: 332 K/UL — SIGNIFICANT CHANGE UP (ref 150–400)
POTASSIUM SERPL-MCNC: 3.7 MMOL/L — SIGNIFICANT CHANGE UP (ref 3.5–5.3)
POTASSIUM SERPL-SCNC: 3.7 MMOL/L — SIGNIFICANT CHANGE UP (ref 3.5–5.3)
PROT SERPL-MCNC: 6.6 G/DL — SIGNIFICANT CHANGE UP (ref 6–8.3)
RBC # BLD: 3.57 M/UL — LOW (ref 3.8–5.2)
RBC # FLD: 18 % — HIGH (ref 10.3–14.5)
SODIUM SERPL-SCNC: 140 MMOL/L — SIGNIFICANT CHANGE UP (ref 135–145)
WBC # BLD: 5 K/UL — SIGNIFICANT CHANGE UP (ref 3.8–10.5)
WBC # FLD AUTO: 5 K/UL — SIGNIFICANT CHANGE UP (ref 3.8–10.5)

## 2018-02-12 PROCEDURE — 88305 TISSUE EXAM BY PATHOLOGIST: CPT | Mod: 26

## 2018-02-12 PROCEDURE — 44005 FREEING OF BOWEL ADHESION: CPT

## 2018-02-12 PROCEDURE — 44005 FREEING OF BOWEL ADHESION: CPT | Mod: AS

## 2018-02-12 RX ORDER — ACETAMINOPHEN 500 MG
1000 TABLET ORAL ONCE
Qty: 0 | Refills: 0 | Status: DISCONTINUED | OUTPATIENT
Start: 2018-02-12 | End: 2018-02-14

## 2018-02-12 RX ORDER — HYDROMORPHONE HYDROCHLORIDE 2 MG/ML
30 INJECTION INTRAMUSCULAR; INTRAVENOUS; SUBCUTANEOUS
Qty: 0 | Refills: 0 | Status: DISCONTINUED | OUTPATIENT
Start: 2018-02-12 | End: 2018-02-14

## 2018-02-12 RX ORDER — CEFOTETAN DISODIUM 1 G
2 VIAL (EA) INJECTION EVERY 12 HOURS
Qty: 0 | Refills: 0 | Status: COMPLETED | OUTPATIENT
Start: 2018-02-12 | End: 2018-02-12

## 2018-02-12 RX ORDER — PANTOPRAZOLE SODIUM 20 MG/1
40 TABLET, DELAYED RELEASE ORAL
Qty: 0 | Refills: 0 | Status: DISCONTINUED | OUTPATIENT
Start: 2018-02-12 | End: 2018-02-23

## 2018-02-12 RX ORDER — NALOXONE HYDROCHLORIDE 4 MG/.1ML
0.1 SPRAY NASAL
Qty: 0 | Refills: 0 | Status: DISCONTINUED | OUTPATIENT
Start: 2018-02-12 | End: 2018-02-14

## 2018-02-12 RX ORDER — HYDROMORPHONE HYDROCHLORIDE 2 MG/ML
0.5 INJECTION INTRAMUSCULAR; INTRAVENOUS; SUBCUTANEOUS
Qty: 0 | Refills: 0 | Status: DISCONTINUED | OUTPATIENT
Start: 2018-02-12 | End: 2018-02-12

## 2018-02-12 RX ORDER — PANTOPRAZOLE SODIUM 20 MG/1
40 TABLET, DELAYED RELEASE ORAL
Qty: 0 | Refills: 0 | Status: DISCONTINUED | OUTPATIENT
Start: 2018-02-12 | End: 2018-02-12

## 2018-02-12 RX ORDER — ONDANSETRON 8 MG/1
4 TABLET, FILM COATED ORAL EVERY 6 HOURS
Qty: 0 | Refills: 0 | Status: DISCONTINUED | OUTPATIENT
Start: 2018-02-12 | End: 2018-02-14

## 2018-02-12 RX ORDER — HEPARIN SODIUM 5000 [USP'U]/ML
5000 INJECTION INTRAVENOUS; SUBCUTANEOUS EVERY 8 HOURS
Qty: 0 | Refills: 0 | Status: DISCONTINUED | OUTPATIENT
Start: 2018-02-12 | End: 2018-02-23

## 2018-02-12 RX ORDER — SODIUM CHLORIDE 9 MG/ML
1000 INJECTION, SOLUTION INTRAVENOUS
Qty: 0 | Refills: 0 | Status: DISCONTINUED | OUTPATIENT
Start: 2018-02-12 | End: 2018-02-15

## 2018-02-12 RX ADMIN — Medication 1.25 MILLIGRAM(S): at 11:51

## 2018-02-12 RX ADMIN — HYDROMORPHONE HYDROCHLORIDE 0.5 MILLIGRAM(S): 2 INJECTION INTRAMUSCULAR; INTRAVENOUS; SUBCUTANEOUS at 18:40

## 2018-02-12 RX ADMIN — Medication 1.25 MILLIGRAM(S): at 05:19

## 2018-02-12 RX ADMIN — HEPARIN SODIUM 5000 UNIT(S): 5000 INJECTION INTRAVENOUS; SUBCUTANEOUS at 23:00

## 2018-02-12 RX ADMIN — HEPARIN SODIUM 5000 UNIT(S): 5000 INJECTION INTRAVENOUS; SUBCUTANEOUS at 14:06

## 2018-02-12 RX ADMIN — DEXTROSE MONOHYDRATE, SODIUM CHLORIDE, AND POTASSIUM CHLORIDE 175 MILLILITER(S): 50; .745; 4.5 INJECTION, SOLUTION INTRAVENOUS at 05:19

## 2018-02-12 RX ADMIN — HYDROMORPHONE HYDROCHLORIDE 0.5 MILLIGRAM(S): 2 INJECTION INTRAMUSCULAR; INTRAVENOUS; SUBCUTANEOUS at 18:14

## 2018-02-12 RX ADMIN — PANTOPRAZOLE SODIUM 40 MILLIGRAM(S): 20 TABLET, DELAYED RELEASE ORAL at 20:17

## 2018-02-12 RX ADMIN — HYDROMORPHONE HYDROCHLORIDE 30 MILLILITER(S): 2 INJECTION INTRAMUSCULAR; INTRAVENOUS; SUBCUTANEOUS at 21:46

## 2018-02-12 RX ADMIN — HEPARIN SODIUM 5000 UNIT(S): 5000 INJECTION INTRAVENOUS; SUBCUTANEOUS at 05:19

## 2018-02-12 RX ADMIN — HYDROMORPHONE HYDROCHLORIDE 30 MILLILITER(S): 2 INJECTION INTRAMUSCULAR; INTRAVENOUS; SUBCUTANEOUS at 18:09

## 2018-02-12 NOTE — PROGRESS NOTE ADULT - SUBJECTIVE AND OBJECTIVE BOX
INTERVAL HPI/OVERNIGHT EVENTS:  Pt resting comfortably. No acute complaints.   NGT to LWS  -flatus/BM.   Denies N/V    MEDICATIONS  (STANDING):  dextrose 5% + sodium chloride 0.45% with potassium chloride 20 mEq/L 1000 milliLiter(s) (175 mL/Hr) IV Continuous <Continuous>  enalaprilat Injectable 1.25 milliGRAM(s) IV Push every 6 hours  heparin  Injectable 5000 Unit(s) SubCutaneous every 8 hours    MEDICATIONS  (PRN):  acetaminophen   Tablet 650 milliGRAM(s) Oral every 6 hours PRN For Temp greater than 38 C (100.4 F)  ondansetron Injectable 4 milliGRAM(s) IV Push every 6 hours PRN Nausea      Vital Signs Last 24 Hrs  T(C): 36.8 (12 Feb 2018 05:15), Max: 37.2 (11 Feb 2018 21:48)  T(F): 98.3 (12 Feb 2018 05:15), Max: 99 (11 Feb 2018 21:48)  HR: 72 (12 Feb 2018 05:15) (70 - 83)  BP: 126/78 (12 Feb 2018 05:15) (120/77 - 126/82)  BP(mean): --  RR: 17 (12 Feb 2018 05:15) (17 - 18)  SpO2: 100% (12 Feb 2018 05:15) (99% - 100%)    Physical:  General: A&Ox3. NAD.  Abdomen: Soft distended, nontender.    I&O's Detail    11 Feb 2018 07:01  -  12 Feb 2018 07:00  --------------------------------------------------------  IN:    dextrose 5% + sodium chloride 0.45%: 1125 mL    dextrose 5% + sodium chloride 0.45% with potassium chloride 20 mEq/L: 1925 mL    Sodium Chloride 0.9% IV Bolus: 1000 mL  Total IN: 4050 mL    OUT:    Nasoenteral Tube: 3050 mL dark red    Voided: 950 mL  Total OUT: 4000 mL    Total NET: 50 mL      12 Feb 2018 07:01  -  12 Feb 2018 09:03  --------------------------------------------------------  IN:  Total IN: 0 mL    OUT:    Nasoenteral Tube: 300 mL  Total OUT: 300 mL    Total NET: -300 mL          LABS:                        9.4    5.1   )-----------( 352      ( 11 Feb 2018 07:05 )             33.6             02-12    140  |  102  |  12  ----------------------------<  108<H>  3.7   |  34<H>  |  0.74    Ca    8.2<L>      12 Feb 2018 08:03    TPro  6.6  /  Alb  2.6<L>  /  TBili  0.4  /  DBili  x   /  AST  17  /  ALT  23  /  AlkPhos  69  02-12

## 2018-02-12 NOTE — BRIEF OPERATIVE NOTE - PROCEDURE
<<-----Click on this checkbox to enter Procedure Exploratory laparotomy with lysis of adhesions  02/12/2018    Active  JOE Serosal tear repair  02/13/2018    Active  JOE  Exploratory laparotomy with lysis of adhesions  02/12/2018    Active  Fawn Clements

## 2018-02-13 ENCOUNTER — TRANSCRIPTION ENCOUNTER (OUTPATIENT)
Age: 63
End: 2018-02-13

## 2018-02-13 DIAGNOSIS — Z98.890 OTHER SPECIFIED POSTPROCEDURAL STATES: ICD-10-CM

## 2018-02-13 DIAGNOSIS — R10.9 UNSPECIFIED ABDOMINAL PAIN: ICD-10-CM

## 2018-02-13 LAB
ANION GAP SERPL CALC-SCNC: 6 MMOL/L — SIGNIFICANT CHANGE UP (ref 5–17)
BASOPHILS # BLD AUTO: 0.1 K/UL — SIGNIFICANT CHANGE UP (ref 0–0.2)
BASOPHILS NFR BLD AUTO: 0.7 % — SIGNIFICANT CHANGE UP (ref 0–2)
BUN SERPL-MCNC: 12 MG/DL — SIGNIFICANT CHANGE UP (ref 7–18)
CALCIUM SERPL-MCNC: 8.1 MG/DL — LOW (ref 8.4–10.5)
CHLORIDE SERPL-SCNC: 101 MMOL/L — SIGNIFICANT CHANGE UP (ref 96–108)
CO2 SERPL-SCNC: 31 MMOL/L — SIGNIFICANT CHANGE UP (ref 22–31)
CREAT SERPL-MCNC: 0.76 MG/DL — SIGNIFICANT CHANGE UP (ref 0.5–1.3)
EOSINOPHIL # BLD AUTO: 0 K/UL — SIGNIFICANT CHANGE UP (ref 0–0.5)
EOSINOPHIL NFR BLD AUTO: 0.5 % — SIGNIFICANT CHANGE UP (ref 0–6)
GLUCOSE SERPL-MCNC: 148 MG/DL — HIGH (ref 70–99)
HCT VFR BLD CALC: 34.4 % — LOW (ref 34.5–45)
HGB BLD-MCNC: 9.9 G/DL — LOW (ref 11.5–15.5)
LYMPHOCYTES # BLD AUTO: 0.9 K/UL — LOW (ref 1–3.3)
LYMPHOCYTES # BLD AUTO: 12.5 % — LOW (ref 13–44)
MCHC RBC-ENTMCNC: 25.7 PG — LOW (ref 27–34)
MCHC RBC-ENTMCNC: 28.8 GM/DL — LOW (ref 32–36)
MCV RBC AUTO: 89.2 FL — SIGNIFICANT CHANGE UP (ref 80–100)
MONOCYTES # BLD AUTO: 0.4 K/UL — SIGNIFICANT CHANGE UP (ref 0–0.9)
MONOCYTES NFR BLD AUTO: 5.5 % — SIGNIFICANT CHANGE UP (ref 2–14)
NEUTROPHILS # BLD AUTO: 6.1 K/UL — SIGNIFICANT CHANGE UP (ref 1.8–7.4)
NEUTROPHILS NFR BLD AUTO: 80.8 % — HIGH (ref 43–77)
PLATELET # BLD AUTO: 341 K/UL — SIGNIFICANT CHANGE UP (ref 150–400)
POTASSIUM SERPL-MCNC: 4 MMOL/L — SIGNIFICANT CHANGE UP (ref 3.5–5.3)
POTASSIUM SERPL-SCNC: 4 MMOL/L — SIGNIFICANT CHANGE UP (ref 3.5–5.3)
RBC # BLD: 3.86 M/UL — SIGNIFICANT CHANGE UP (ref 3.8–5.2)
RBC # FLD: 18 % — HIGH (ref 10.3–14.5)
SODIUM SERPL-SCNC: 138 MMOL/L — SIGNIFICANT CHANGE UP (ref 135–145)
WBC # BLD: 7.6 K/UL — SIGNIFICANT CHANGE UP (ref 3.8–10.5)
WBC # FLD AUTO: 7.6 K/UL — SIGNIFICANT CHANGE UP (ref 3.8–10.5)

## 2018-02-13 PROCEDURE — 99223 1ST HOSP IP/OBS HIGH 75: CPT

## 2018-02-13 RX ADMIN — HYDROMORPHONE HYDROCHLORIDE 30 MILLILITER(S): 2 INJECTION INTRAMUSCULAR; INTRAVENOUS; SUBCUTANEOUS at 07:13

## 2018-02-13 RX ADMIN — Medication 1.25 MILLIGRAM(S): at 12:18

## 2018-02-13 RX ADMIN — SODIUM CHLORIDE 110 MILLILITER(S): 9 INJECTION, SOLUTION INTRAVENOUS at 12:20

## 2018-02-13 RX ADMIN — PANTOPRAZOLE SODIUM 40 MILLIGRAM(S): 20 TABLET, DELAYED RELEASE ORAL at 06:45

## 2018-02-13 RX ADMIN — SODIUM CHLORIDE 110 MILLILITER(S): 9 INJECTION, SOLUTION INTRAVENOUS at 21:40

## 2018-02-13 RX ADMIN — HEPARIN SODIUM 5000 UNIT(S): 5000 INJECTION INTRAVENOUS; SUBCUTANEOUS at 06:44

## 2018-02-13 RX ADMIN — PANTOPRAZOLE SODIUM 40 MILLIGRAM(S): 20 TABLET, DELAYED RELEASE ORAL at 18:19

## 2018-02-13 RX ADMIN — HEPARIN SODIUM 5000 UNIT(S): 5000 INJECTION INTRAVENOUS; SUBCUTANEOUS at 21:41

## 2018-02-13 RX ADMIN — HEPARIN SODIUM 5000 UNIT(S): 5000 INJECTION INTRAVENOUS; SUBCUTANEOUS at 13:57

## 2018-02-13 RX ADMIN — Medication 1.25 MILLIGRAM(S): at 06:45

## 2018-02-13 RX ADMIN — HYDROMORPHONE HYDROCHLORIDE 30 MILLILITER(S): 2 INJECTION INTRAMUSCULAR; INTRAVENOUS; SUBCUTANEOUS at 19:10

## 2018-02-13 NOTE — PROGRESS NOTE ADULT - SUBJECTIVE AND OBJECTIVE BOX
63 y/o female s/p exploratory laparotomy POD # 1. Patient examined at bedside, complains of incisional pain. Denies any flatus or BM.   No nausea, no vomiting  NGT in place, velazquez in place, PCA pain control.     T(F): 98.2 (02-13-18 @ 06:07), Max: 98.2 (02-13-18 @ 06:07)  HR: 81 (02-13-18 @ 06:07) (70 - 94)  BP: 112/68 (02-13-18 @ 06:07) (97/72 - 153/99)  RR: 16 (02-13-18 @ 06:07) (16 - 20)  SpO2: 100% (02-13-18 @ 06:07) (96% - 100%)  Wt(kg): --      02-12 @ 07:01  -  02-13 @ 07:00  --------------------------------------------------------  IN:    dextrose 5% + sodium chloride 0.45%.: 1335 mL    Lactated Ringers IV Bolus: 2600 mL  Total IN: 3935 mL    OUT:    Indwelling Catheter - Urethral: 650 mL    Nasoenteral Tube: 1050 mL    Voided: 400 mL  Total OUT: 2100 mL    Total NET: 1835 mL                            9.9    x     )-----------( 341      ( 13 Feb 2018 07:02 )             34.4         02-13    138  |  101  |  12  ----------------------------<  148<H>  4.0   |  31  |  0.76    Ca    8.1<L>      13 Feb 2018 07:02    TPro  6.6  /  Alb  2.6<L>  /  TBili  0.4  /  DBili  x   /  AST  17  /  ALT  23  /  AlkPhos  69  02-12    Physical Exam  General: AAOx3, No acute distress  Skin: No jaundice, no icterus  Abdomen: soft, mildly distended, dimitris incisional tenderness, no rebound tenderness, no guarding, no palpable masses  : Normal external genitalia  Extremities: non edematous, no calf pain bilaterally

## 2018-02-13 NOTE — PROGRESS NOTE ADULT - ATTENDING COMMENTS
POD#1 following lysis of adhesions/laparotomy for complete SBO   Feels better, less abdominal pain    Afebrile,  OOB in chair     Abd  less distended, soft, no flatus,  dressing dry and intact      UO good   ml last shift    WBC wnl    Imp  Satisfactory night    Plan  D/c Cain in am, continue NGT, await bowel function

## 2018-02-14 LAB
ANION GAP SERPL CALC-SCNC: 3 MMOL/L — LOW (ref 5–17)
BUN SERPL-MCNC: 15 MG/DL — SIGNIFICANT CHANGE UP (ref 7–18)
CALCIUM SERPL-MCNC: 7.7 MG/DL — LOW (ref 8.4–10.5)
CHLORIDE SERPL-SCNC: 100 MMOL/L — SIGNIFICANT CHANGE UP (ref 96–108)
CO2 SERPL-SCNC: 31 MMOL/L — SIGNIFICANT CHANGE UP (ref 22–31)
CREAT SERPL-MCNC: 0.73 MG/DL — SIGNIFICANT CHANGE UP (ref 0.5–1.3)
GLUCOSE SERPL-MCNC: 111 MG/DL — HIGH (ref 70–99)
HCT VFR BLD CALC: 27.4 % — LOW (ref 34.5–45)
HGB BLD-MCNC: 8.6 G/DL — LOW (ref 11.5–15.5)
MCHC RBC-ENTMCNC: 27.6 PG — SIGNIFICANT CHANGE UP (ref 27–34)
MCHC RBC-ENTMCNC: 31.5 GM/DL — LOW (ref 32–36)
MCV RBC AUTO: 87.7 FL — SIGNIFICANT CHANGE UP (ref 80–100)
PLATELET # BLD AUTO: 304 K/UL — SIGNIFICANT CHANGE UP (ref 150–400)
POTASSIUM SERPL-MCNC: 3.9 MMOL/L — SIGNIFICANT CHANGE UP (ref 3.5–5.3)
POTASSIUM SERPL-SCNC: 3.9 MMOL/L — SIGNIFICANT CHANGE UP (ref 3.5–5.3)
RBC # BLD: 3.13 M/UL — LOW (ref 3.8–5.2)
RBC # FLD: 17.6 % — HIGH (ref 10.3–14.5)
SODIUM SERPL-SCNC: 134 MMOL/L — LOW (ref 135–145)
WBC # BLD: 6 K/UL — SIGNIFICANT CHANGE UP (ref 3.8–10.5)
WBC # FLD AUTO: 6 K/UL — SIGNIFICANT CHANGE UP (ref 3.8–10.5)

## 2018-02-14 RX ORDER — KETOROLAC TROMETHAMINE 30 MG/ML
30 SYRINGE (ML) INJECTION EVERY 6 HOURS
Qty: 0 | Refills: 0 | Status: DISCONTINUED | OUTPATIENT
Start: 2018-02-14 | End: 2018-02-17

## 2018-02-14 RX ORDER — ACETAMINOPHEN 500 MG
1000 TABLET ORAL ONCE
Qty: 0 | Refills: 0 | Status: DISCONTINUED | OUTPATIENT
Start: 2018-02-14 | End: 2018-02-23

## 2018-02-14 RX ADMIN — HEPARIN SODIUM 5000 UNIT(S): 5000 INJECTION INTRAVENOUS; SUBCUTANEOUS at 14:51

## 2018-02-14 RX ADMIN — HYDROMORPHONE HYDROCHLORIDE 30 MILLILITER(S): 2 INJECTION INTRAMUSCULAR; INTRAVENOUS; SUBCUTANEOUS at 07:18

## 2018-02-14 RX ADMIN — Medication 1.25 MILLIGRAM(S): at 00:35

## 2018-02-14 RX ADMIN — PANTOPRAZOLE SODIUM 40 MILLIGRAM(S): 20 TABLET, DELAYED RELEASE ORAL at 18:01

## 2018-02-14 RX ADMIN — Medication 1.25 MILLIGRAM(S): at 14:51

## 2018-02-14 RX ADMIN — Medication 1.25 MILLIGRAM(S): at 18:00

## 2018-02-14 RX ADMIN — HEPARIN SODIUM 5000 UNIT(S): 5000 INJECTION INTRAVENOUS; SUBCUTANEOUS at 07:08

## 2018-02-14 RX ADMIN — HEPARIN SODIUM 5000 UNIT(S): 5000 INJECTION INTRAVENOUS; SUBCUTANEOUS at 22:00

## 2018-02-14 RX ADMIN — PANTOPRAZOLE SODIUM 40 MILLIGRAM(S): 20 TABLET, DELAYED RELEASE ORAL at 07:08

## 2018-02-14 RX ADMIN — SODIUM CHLORIDE 110 MILLILITER(S): 9 INJECTION, SOLUTION INTRAVENOUS at 18:01

## 2018-02-14 RX ADMIN — Medication 1.25 MILLIGRAM(S): at 07:09

## 2018-02-14 NOTE — PROGRESS NOTE ADULT - SUBJECTIVE AND OBJECTIVE BOX
POD#2  62y Female with no overnight complaints. no flatus or bm yet  no nausea or vomitting    Vital Signs:  T(C): 37.1 (02-14-18 @ 06:00), Max: 37.4 (02-13-18 @ 22:27)  HR: 76 (02-14-18 @ 06:00) (75 - 89)  BP: 110/67 (02-14-18 @ 06:00) (110/67 - 121/82)  RR: 16 (02-14-18 @ 06:00) (16 - 17)  SpO2: 100% (02-14-18 @ 06:00) (100% - 100%)  Wt(kg): --    Physical Exam:  General: NAD, comfortable  Abdomen: softly distended. midline wound stapled closed, c/d/i. no drainage    Ins/Outs:    02-13 @ 07:01  -  02-14 @ 07:00  --------------------------------------------------------  IN:    dextrose 5% + sodium chloride 0.45%.: 1320 mL  Total IN: 1320 mL    OUT:    Indwelling Catheter - Urethral: 600 mL    Nasoenteral Tube: 700 mL  Total OUT: 1300 mL    Total NET: 20 mL                            8.6    6.0   )-----------( 304      ( 14 Feb 2018 05:53 )             27.4

## 2018-02-14 NOTE — PROGRESS NOTE ADULT - ATTENDING COMMENTS
As above  POD#2 following ALBINA      Afebrile, OOB less pain  PCA d/c'd    Abd  softly distended, non tender  Wound clean dry    Voiding spontaneously following Cain removal    Hct 27     ml    Imp  Progressing steadily  Plan  Continue NGT, encourage ambulation

## 2018-02-15 LAB
ANION GAP SERPL CALC-SCNC: 4 MMOL/L — LOW (ref 5–17)
BASOPHILS # BLD AUTO: 0 K/UL — SIGNIFICANT CHANGE UP (ref 0–0.2)
BASOPHILS NFR BLD AUTO: 0.8 % — SIGNIFICANT CHANGE UP (ref 0–2)
BUN SERPL-MCNC: 10 MG/DL — SIGNIFICANT CHANGE UP (ref 7–18)
CALCIUM SERPL-MCNC: 8 MG/DL — LOW (ref 8.4–10.5)
CHLORIDE SERPL-SCNC: 103 MMOL/L — SIGNIFICANT CHANGE UP (ref 96–108)
CO2 SERPL-SCNC: 30 MMOL/L — SIGNIFICANT CHANGE UP (ref 22–31)
CREAT SERPL-MCNC: 0.71 MG/DL — SIGNIFICANT CHANGE UP (ref 0.5–1.3)
EOSINOPHIL # BLD AUTO: 0.1 K/UL — SIGNIFICANT CHANGE UP (ref 0–0.5)
EOSINOPHIL NFR BLD AUTO: 2.6 % — SIGNIFICANT CHANGE UP (ref 0–6)
GLUCOSE SERPL-MCNC: 99 MG/DL — SIGNIFICANT CHANGE UP (ref 70–99)
HCT VFR BLD CALC: 27.1 % — LOW (ref 34.5–45)
HGB BLD-MCNC: 8.3 G/DL — LOW (ref 11.5–15.5)
LYMPHOCYTES # BLD AUTO: 1.1 K/UL — SIGNIFICANT CHANGE UP (ref 1–3.3)
LYMPHOCYTES # BLD AUTO: 23 % — SIGNIFICANT CHANGE UP (ref 13–44)
MAGNESIUM SERPL-MCNC: 2.1 MG/DL — SIGNIFICANT CHANGE UP (ref 1.6–2.6)
MCHC RBC-ENTMCNC: 27.2 PG — SIGNIFICANT CHANGE UP (ref 27–34)
MCHC RBC-ENTMCNC: 30.7 GM/DL — LOW (ref 32–36)
MCV RBC AUTO: 88.4 FL — SIGNIFICANT CHANGE UP (ref 80–100)
MONOCYTES # BLD AUTO: 0.4 K/UL — SIGNIFICANT CHANGE UP (ref 0–0.9)
MONOCYTES NFR BLD AUTO: 9.7 % — SIGNIFICANT CHANGE UP (ref 2–14)
NEUTROPHILS # BLD AUTO: 3 K/UL — SIGNIFICANT CHANGE UP (ref 1.8–7.4)
NEUTROPHILS NFR BLD AUTO: 63.9 % — SIGNIFICANT CHANGE UP (ref 43–77)
PHOSPHATE SERPL-MCNC: 2.9 MG/DL — SIGNIFICANT CHANGE UP (ref 2.5–4.5)
PLATELET # BLD AUTO: 299 K/UL — SIGNIFICANT CHANGE UP (ref 150–400)
POTASSIUM SERPL-MCNC: 3.4 MMOL/L — LOW (ref 3.5–5.3)
POTASSIUM SERPL-SCNC: 3.4 MMOL/L — LOW (ref 3.5–5.3)
RBC # BLD: 3.06 M/UL — LOW (ref 3.8–5.2)
RBC # FLD: 17.1 % — HIGH (ref 10.3–14.5)
SODIUM SERPL-SCNC: 137 MMOL/L — SIGNIFICANT CHANGE UP (ref 135–145)
SURGICAL PATHOLOGY FINAL REPORT - CH: SIGNIFICANT CHANGE UP
WBC # BLD: 4.6 K/UL — SIGNIFICANT CHANGE UP (ref 3.8–10.5)
WBC # FLD AUTO: 4.6 K/UL — SIGNIFICANT CHANGE UP (ref 3.8–10.5)

## 2018-02-15 PROCEDURE — 74018 RADEX ABDOMEN 1 VIEW: CPT | Mod: 26

## 2018-02-15 RX ORDER — POTASSIUM CHLORIDE 20 MEQ
10 PACKET (EA) ORAL
Qty: 0 | Refills: 0 | Status: COMPLETED | OUTPATIENT
Start: 2018-02-15 | End: 2018-02-15

## 2018-02-15 RX ORDER — SODIUM CHLORIDE 9 MG/ML
1000 INJECTION, SOLUTION INTRAVENOUS
Qty: 0 | Refills: 0 | Status: DISCONTINUED | OUTPATIENT
Start: 2018-02-15 | End: 2018-02-17

## 2018-02-15 RX ADMIN — Medication 1.25 MILLIGRAM(S): at 00:18

## 2018-02-15 RX ADMIN — Medication 100 MILLIEQUIVALENT(S): at 12:14

## 2018-02-15 RX ADMIN — SODIUM CHLORIDE 125 MILLILITER(S): 9 INJECTION, SOLUTION INTRAVENOUS at 10:31

## 2018-02-15 RX ADMIN — HEPARIN SODIUM 5000 UNIT(S): 5000 INJECTION INTRAVENOUS; SUBCUTANEOUS at 21:43

## 2018-02-15 RX ADMIN — Medication 1.25 MILLIGRAM(S): at 12:14

## 2018-02-15 RX ADMIN — SODIUM CHLORIDE 110 MILLILITER(S): 9 INJECTION, SOLUTION INTRAVENOUS at 00:18

## 2018-02-15 RX ADMIN — HEPARIN SODIUM 5000 UNIT(S): 5000 INJECTION INTRAVENOUS; SUBCUTANEOUS at 14:34

## 2018-02-15 RX ADMIN — SODIUM CHLORIDE 110 MILLILITER(S): 9 INJECTION, SOLUTION INTRAVENOUS at 06:30

## 2018-02-15 RX ADMIN — Medication 100 MILLIEQUIVALENT(S): at 14:29

## 2018-02-15 RX ADMIN — Medication 1.25 MILLIGRAM(S): at 18:04

## 2018-02-15 RX ADMIN — Medication 1.25 MILLIGRAM(S): at 06:30

## 2018-02-15 RX ADMIN — HEPARIN SODIUM 5000 UNIT(S): 5000 INJECTION INTRAVENOUS; SUBCUTANEOUS at 06:30

## 2018-02-15 RX ADMIN — Medication 100 MILLIEQUIVALENT(S): at 10:13

## 2018-02-15 RX ADMIN — PANTOPRAZOLE SODIUM 40 MILLIGRAM(S): 20 TABLET, DELAYED RELEASE ORAL at 18:04

## 2018-02-15 RX ADMIN — PANTOPRAZOLE SODIUM 40 MILLIGRAM(S): 20 TABLET, DELAYED RELEASE ORAL at 06:30

## 2018-02-15 NOTE — PROGRESS NOTE ADULT - SUBJECTIVE AND OBJECTIVE BOX
INTERVAL HPI/OVERNIGHT EVENTS:  Pt resting comfortably. No acute complaints.   NGT to LWS  -flatus/BM.   Denies N/V    MEDICATIONS  (STANDING):  dextrose 5% + sodium chloride 0.45%. 1000 milliLiter(s) (110 mL/Hr) IV Continuous <Continuous>  enalaprilat Injectable 1.25 milliGRAM(s) IV Push every 6 hours  heparin  Injectable 5000 Unit(s) SubCutaneous every 8 hours  pantoprazole  Injectable 40 milliGRAM(s) IV Push two times a day  potassium chloride  10 mEq/100 mL IVPB 10 milliEquivalent(s) IV Intermittent every 1 hour    MEDICATIONS  (PRN):  acetaminophen  IVPB. 1000 milliGRAM(s) IV Intermittent once PRN breakthrough pain  ketorolac   Injectable 30 milliGRAM(s) IV Push every 6 hours PRN Moderate Pain (4 - 6)      Vital Signs Last 24 Hrs  T(C): 36.9 (15 Feb 2018 05:59), Max: 37.3 (14 Feb 2018 22:06)  T(F): 98.5 (15 Feb 2018 05:59), Max: 99.1 (14 Feb 2018 22:06)  HR: 65 (15 Feb 2018 05:59) (65 - 84)  BP: 127/69 (15 Feb 2018 05:59) (126/70 - 140/66)  BP(mean): --  RR: 16 (15 Feb 2018 05:59) (16 - 17)  SpO2: 99% (15 Feb 2018 05:59) (98% - 100%)    Physical:  General: A&Ox3. NAD.  Abdomen: Soft less distended, midline wound clean, dry. Staples intact.    I&O's Detail    14 Feb 2018 07:01  -  15 Feb 2018 07:00  --------------------------------------------------------  IN:    dextrose 5% + sodium chloride 0.45%.: 990 mL  Total IN: 990 mL    OUT:    Indwelling Catheter - Urethral: 150 mL    Nasoenteral Tube: 1150 mL dark bilious    Voided: 800 mL  Total OUT: 2100 mL    Total NET: -1110 mL    LABS:                        8.3    4.6   )-----------( 299      ( 15 Feb 2018 07:51 )             27.1             02-15    137  |  103  |  10  ----------------------------<  99  3.4<L>   |  30  |  0.71    Ca    8.0<L>      15 Feb 2018 07:51  Phos  2.9     02-15  Mg     2.1     02-15

## 2018-02-16 DIAGNOSIS — E87.6 HYPOKALEMIA: ICD-10-CM

## 2018-02-16 DIAGNOSIS — E43 UNSPECIFIED SEVERE PROTEIN-CALORIE MALNUTRITION: ICD-10-CM

## 2018-02-16 DIAGNOSIS — I10 ESSENTIAL (PRIMARY) HYPERTENSION: ICD-10-CM

## 2018-02-16 LAB
ANION GAP SERPL CALC-SCNC: 5 MMOL/L — SIGNIFICANT CHANGE UP (ref 5–17)
BUN SERPL-MCNC: 10 MG/DL — SIGNIFICANT CHANGE UP (ref 7–18)
CALCIUM SERPL-MCNC: 8.1 MG/DL — LOW (ref 8.4–10.5)
CHLORIDE SERPL-SCNC: 103 MMOL/L — SIGNIFICANT CHANGE UP (ref 96–108)
CO2 SERPL-SCNC: 30 MMOL/L — SIGNIFICANT CHANGE UP (ref 22–31)
CREAT SERPL-MCNC: 0.7 MG/DL — SIGNIFICANT CHANGE UP (ref 0.5–1.3)
GLUCOSE SERPL-MCNC: 99 MG/DL — SIGNIFICANT CHANGE UP (ref 70–99)
HCT VFR BLD CALC: 28.1 % — LOW (ref 34.5–45)
HGB BLD-MCNC: 8.1 G/DL — LOW (ref 11.5–15.5)
MCHC RBC-ENTMCNC: 25.2 PG — LOW (ref 27–34)
MCHC RBC-ENTMCNC: 28.9 GM/DL — LOW (ref 32–36)
MCV RBC AUTO: 87.2 FL — SIGNIFICANT CHANGE UP (ref 80–100)
PLATELET # BLD AUTO: 312 K/UL — SIGNIFICANT CHANGE UP (ref 150–400)
POTASSIUM SERPL-MCNC: 3.2 MMOL/L — LOW (ref 3.5–5.3)
POTASSIUM SERPL-SCNC: 3.2 MMOL/L — LOW (ref 3.5–5.3)
RBC # BLD: 3.23 M/UL — LOW (ref 3.8–5.2)
RBC # FLD: 16.9 % — HIGH (ref 10.3–14.5)
SODIUM SERPL-SCNC: 138 MMOL/L — SIGNIFICANT CHANGE UP (ref 135–145)
WBC # BLD: 3.8 K/UL — SIGNIFICANT CHANGE UP (ref 3.8–10.5)
WBC # FLD AUTO: 3.8 K/UL — SIGNIFICANT CHANGE UP (ref 3.8–10.5)

## 2018-02-16 PROCEDURE — 77001 FLUOROGUIDE FOR VEIN DEVICE: CPT | Mod: 26

## 2018-02-16 PROCEDURE — 76937 US GUIDE VASCULAR ACCESS: CPT | Mod: 26

## 2018-02-16 PROCEDURE — 36569 INSJ PICC 5 YR+ W/O IMAGING: CPT

## 2018-02-16 RX ORDER — SODIUM CHLORIDE 9 MG/ML
10 INJECTION INTRAMUSCULAR; INTRAVENOUS; SUBCUTANEOUS
Qty: 0 | Refills: 0 | Status: DISCONTINUED | OUTPATIENT
Start: 2018-02-16 | End: 2018-02-23

## 2018-02-16 RX ORDER — SODIUM CHLORIDE 9 MG/ML
20 INJECTION INTRAMUSCULAR; INTRAVENOUS; SUBCUTANEOUS ONCE
Qty: 0 | Refills: 0 | Status: DISCONTINUED | OUTPATIENT
Start: 2018-02-16 | End: 2018-02-23

## 2018-02-16 RX ORDER — SODIUM CHLORIDE 9 MG/ML
10 INJECTION INTRAMUSCULAR; INTRAVENOUS; SUBCUTANEOUS EVERY 12 HOURS
Qty: 0 | Refills: 0 | Status: DISCONTINUED | OUTPATIENT
Start: 2018-02-16 | End: 2018-02-23

## 2018-02-16 RX ORDER — I.V. FAT EMULSION 20 G/100ML
0.4 EMULSION INTRAVENOUS
Qty: 23.96 | Refills: 0 | Status: DISCONTINUED | OUTPATIENT
Start: 2018-02-16 | End: 2018-02-16

## 2018-02-16 RX ORDER — POTASSIUM CHLORIDE 20 MEQ
10 PACKET (EA) ORAL
Qty: 0 | Refills: 0 | Status: COMPLETED | OUTPATIENT
Start: 2018-02-16 | End: 2018-02-16

## 2018-02-16 RX ORDER — ELECTROLYTE SOLUTION,INJ
1 VIAL (ML) INTRAVENOUS
Qty: 0 | Refills: 0 | Status: DISCONTINUED | OUTPATIENT
Start: 2018-02-16 | End: 2018-02-16

## 2018-02-16 RX ADMIN — Medication 42 EACH: at 21:52

## 2018-02-16 RX ADMIN — Medication 1.25 MILLIGRAM(S): at 18:04

## 2018-02-16 RX ADMIN — Medication 100 MILLIEQUIVALENT(S): at 11:37

## 2018-02-16 RX ADMIN — PANTOPRAZOLE SODIUM 40 MILLIGRAM(S): 20 TABLET, DELAYED RELEASE ORAL at 18:04

## 2018-02-16 RX ADMIN — HEPARIN SODIUM 5000 UNIT(S): 5000 INJECTION INTRAVENOUS; SUBCUTANEOUS at 14:00

## 2018-02-16 RX ADMIN — Medication 1.25 MILLIGRAM(S): at 23:53

## 2018-02-16 RX ADMIN — Medication 1.25 MILLIGRAM(S): at 05:45

## 2018-02-16 RX ADMIN — Medication 100 MILLIEQUIVALENT(S): at 11:38

## 2018-02-16 RX ADMIN — PANTOPRAZOLE SODIUM 40 MILLIGRAM(S): 20 TABLET, DELAYED RELEASE ORAL at 05:46

## 2018-02-16 RX ADMIN — I.V. FAT EMULSION 4.99 GM/KG/DAY: 20 EMULSION INTRAVENOUS at 21:50

## 2018-02-16 RX ADMIN — HEPARIN SODIUM 5000 UNIT(S): 5000 INJECTION INTRAVENOUS; SUBCUTANEOUS at 21:50

## 2018-02-16 RX ADMIN — Medication 1.25 MILLIGRAM(S): at 11:38

## 2018-02-16 RX ADMIN — HEPARIN SODIUM 5000 UNIT(S): 5000 INJECTION INTRAVENOUS; SUBCUTANEOUS at 05:45

## 2018-02-16 NOTE — CONSULT NOTE ADULT - SUBJECTIVE AND OBJECTIVE BOX
HPI:  62 y.o. F w/ PMH multiple SBO s/p SB resection in 2015 presents to [facility] ER c/o vomiting since last night. Pt states her last meal was lunch time, consisting of corn beef and rice. Associated with only mild upper abdominal discomfort. Last BM 2 days ago, which is abnormal for pt as she usually has daily BM's. Last [facility] admission April 2017 for SBO, which resolved with conservative management. Denies fever, chills, diarrhea, dysuria. Last colonoscopy last year, which pt states was normal. (08 Feb 2018 16:35)      2/13/14 -62 year old female s/p exp-lap, pod#1.  Pt complaining of mild abdominal pain which worsens with exertion.  No nausea or vomiting. NGT in place.  Pt on pca hydromorphone.  PCA teaching done.      PAIN SCORE:   5/10      SCALE USED: (1-10 VNRS)      PAST MEDICAL & SURGICAL HISTORY:  SBO (small bowel obstruction)  HTN (hypertension)  History of intestinal surgery      FAMILY HISTORY:      SOCIAL HISTORY:  [ ] Denies Smoking, Alcohol, or Drug Use    Allergies    No Known Allergies    Intolerances        PAIN MEDICATIONS:  acetaminophen  IVPB 1000 milliGRAM(s) IV Intermittent once  HYDROmorphone PCA (1 mG/mL) 30 milliLiter(s) PCA Continuous PCA Continuous  ondansetron Injectable 4 milliGRAM(s) IV Push every 6 hours PRN    Heme:  heparin  Injectable 5000 Unit(s) SubCutaneous every 8 hours    Antibiotics:    Cardiovascular:  enalaprilat Injectable 1.25 milliGRAM(s) IV Push every 6 hours    GI:  pantoprazole  Injectable 40 milliGRAM(s) IV Push two times a day    Endocrine:    All Other Medications:  dextrose 5% + sodium chloride 0.45%. 1000 milliLiter(s) IV Continuous <Continuous>  naloxone Injectable 0.1 milliGRAM(s) IV Push every 3 minutes PRN          Vital Signs Last 24 Hrs  T(C): 36.8 (13 Feb 2018 06:07), Max: 36.8 (13 Feb 2018 06:07)  T(F): 98.2 (13 Feb 2018 06:07), Max: 98.2 (13 Feb 2018 06:07)  HR: 89 (13 Feb 2018 12:13) (70 - 94)  BP: 121/82 (13 Feb 2018 12:13) (97/72 - 153/99)  BP(mean): 112 (12 Feb 2018 21:26) (111 - 114)  RR: 16 (13 Feb 2018 06:07) (16 - 20)  SpO2: 100% (13 Feb 2018 06:07) (96% - 100%)                       LABS:                          9.9    7.6   )-----------( 341      ( 13 Feb 2018 07:02 )             34.4     02-13    138  |  101  |  12  ----------------------------<  148<H>  4.0   |  31  |  0.76    Ca    8.1<L>      13 Feb 2018 07:02    TPro  6.6  /  Alb  2.6<L>  /  TBili  0.4  /  DBili  x   /  AST  17  /  ALT  23  /  AlkPhos  69  02-12          RADIOLOGY:    Drug Screen:            [ ]  [place]  Reviewed and Copied to Chart
[facility] NEPHROLOGY- METABOLIC SUPPORT SERVICE CONSULTATION NOTE    Patient is a 62y Female presented to the hospital with n/v, found to have SBO, requiring Ex-lap and ALBINA, now POD #4, NPO X 9 days minimum in hospital, and probalby ~12 days no real nutrition.  Pt still with nausea.  Has NGT to suction.  Feels weak.  Mild abdominal pain, only when touched.  Mild h/a.  No other complaints.    PAST MEDICAL & SURGICAL HISTORY:  SBO (small bowel obstruction)  HTN (hypertension)  History of intestinal surgery    No Known Allergies    Home Medications Reviewed  Hospital Medications:   MEDICATIONS  (STANDING):  dextrose 5% + sodium chloride 0.45% 1000 milliLiter(s) (125 mL/Hr) IV Continuous <Continuous>  enalaprilat Injectable 1.25 milliGRAM(s) IV Push every 6 hours  heparin  Injectable 5000 Unit(s) SubCutaneous every 8 hours  pantoprazole  Injectable 40 milliGRAM(s) IV Push two times a day    SOCIAL HISTORY:  Denies ETOh,Smoking,   FAMILY HISTORY:    REVIEW OF SYSTEMS:  CONSTITUTIONAL: +weakness, No fevers or chills  EYES/ENT: No visual changes;  No vertigo or throat pain   NECK: No pain or stiffness  RESPIRATORY: No cough, wheezing, hemoptysis; No shortness of breath  CARDIOVASCULAR: No chest pain or palpitations.  GASTROINTESTINAL: Mild abdominal pain only.  + nausea. Not passing  gas  GENITOURINARY: No dysuria, frequency, foamy urine, urinary urgency, incontinence or hematuria  NEUROLOGICAL: No numbness or weakness  SKIN: No itching, burning, rashes, or lesions   VASCULAR: No bilateral lower extremity edema.   All other review of systems is negative unless indicated above.    VITALS:  T(F): 98.3 (02-16-18 @ 06:13), Max: 98.4 (02-15-18 @ 14:00)  HR: 67 (02-16-18 @ 06:13)  BP: 125/77 (02-16-18 @ 06:13)  RR: 16 (02-16-18 @ 06:13)  SpO2: 100% (02-16-18 @ 06:13)  Wt(kg): --  Height (cm): 167.64 (02-08 @ 09:20)  Weight (kg): 59.9 (02-08 @ 09:20)  BMI (kg/m2): 21.3 (02-08 @ 09:20)  BSA (m2): 1.68 (02-08 @ 09:20)    02-15 @ 07:01  -  02-16 @ 07:00  --------------------------------------------------------  IN: 0 mL / OUT: 1850 mL / NET: -1850 mL      PHYSICAL EXAM:  Constitutional: thin, but not cachectic, starting to look debilitated  HEENT: wasting at temples bilaterally, anicteric sclera, oropharynx clear, MMM  Neck: No JVD  Respiratory: CTAB, no wheezes, rales or rhonchi  Cardiovascular: S1, S2, RRR  Gastrointestinal: BS+, soft, NT/ND  Extremities: No cyanosis or clubbing. No peripheral edema  Neurological: A/O x 3, no focal deficits  Psychiatric: Depressed mood/affect  : No CVA tenderness. No velazquez.   Skin: No rashes  Musculoskeletal: wasting of thenar eminance.  General muscle wasting  Vascular Access: no central vascular access yet    LABS:  02-15    137  |  103  |  10  ----------------------------<  99  3.4<L>   |  30  |  0.71    Ca    8.0<L>      15 Feb 2018 07:51  Phos  2.9     02-15  Mg     2.1     02-15      Creatinine Trend: 0.71 <--, 0.73 <--, 0.76 <--, 0.74 <--, 0.70 <--, 0.80 <--, 0.78 <--                        8.1    3.8   )-----------( 312      ( 16 Feb 2018 07:42 )             28.1     Urine Studies:      RADIOLOGY & ADDITIONAL STUDIES:

## 2018-02-16 NOTE — PROGRESS NOTE ADULT - ATTENDING COMMENTS
As above  Still not having GI function although less distended X-ray shows improvement    Abd  softly distended, non tender     Wound clean dry    Imp SBO, not yet resolved  Plan  Continue NGT, begin TPN

## 2018-02-16 NOTE — CONSULT NOTE ADULT - ASSESSMENT
62 year-old woman with severe PCM as a result of SBO with prolonged NPO.  HTN controlled with enalaprilat.  Mild hypokalemia

## 2018-02-16 NOTE — PROGRESS NOTE ADULT - SUBJECTIVE AND OBJECTIVE BOX
General Surgery  POD #4     Subjective:  Patient seen at bedside  Pt states feels well.    Denies abdominal pain, nausea, vomiting  Denies flatus/BM    T(C): 36.8 (02-16-18 @ 06:13), Max: 36.9 (02-15-18 @ 14:00)  HR: 67 (02-16-18 @ 06:13) (62 - 78)  BP: 125/77 (02-16-18 @ 06:13) (110/64 - 137/79)  RR: 16 (02-16-18 @ 06:13) (16 - 17)  SpO2: 100% (02-16-18 @ 06:13) (99% - 100%)  Wt(kg): --    Physical Exam:    Gen: awake, alert oriented NAD  HEENT: NGT in place with bilious output   Abd: soft distended, not tender    MEDICATIONS  (STANDING):  dextrose 5% + sodium chloride 0.45% 1000 milliLiter(s) (125 mL/Hr) IV Continuous <Continuous>  enalaprilat Injectable 1.25 milliGRAM(s) IV Push every 6 hours  heparin  Injectable 5000 Unit(s) SubCutaneous every 8 hours  pantoprazole  Injectable 40 milliGRAM(s) IV Push two times a day    MEDICATIONS  (PRN):  acetaminophen  IVPB. 1000 milliGRAM(s) IV Intermittent once PRN breakthrough pain  ketorolac   Injectable 30 milliGRAM(s) IV Push every 6 hours PRN Moderate Pain (4 - 6)          I&O's Detail    15 Feb 2018 07:01  -  16 Feb 2018 07:00  --------------------------------------------------------  IN:  Total IN: 0 mL    OUT:    Nasoenteral Tube: 1150 mL    Voided: 700 mL  Total OUT: 1850 mL    Total NET: -1850 mL    Radiology:  < from: Xray Abdomen 1 View PORTABLE -Urgent (02.15.18 @ 11:11) >  EXAM:  XR ABDOMEN PORTABLE URGENT 1V                            PROCEDURE DATE:  02/15/2018      INTERPRETATION:  EXAM: XR ABDOMEN PORTABLE URGENT 1V     HISTORY: Small bowel obstruction status post exploratory laparotomy   postop day 3.    COMPARISON: 2/11/2018.    TECHNIQUE: Portable supine AP view of the abdomen.    FINDINGS:   Enteric tube remains in place with tip projecting over the left upper   quadrant. There is decreased dilatation of bowel loops, with persistently   dilated small bowel loops in the left hemiabdomen and right lower   quadrant. Evaluation for free air is limited. There are three   peripherally calcified stones projecting over the right upper quadrant   likely representing gallstones measuring up to 1.5 cm. Midline   longitudinal skin staples are now seen.    IMPRESSION:  Postsurgical changes. Decreased dilatation of bowel loops, though with   persistently dilated but improved small bowel loops in the left   hemiabdomen and right lower quadrant.      < end of copied text >

## 2018-02-16 NOTE — CONSULT NOTE ADULT - PROBLEM SELECTOR RECOMMENDATION 9
TPN would likely benefit this patient.  Risks/benefits discussed with patient and she agrees.  Place PICC.  Would give 1/2 dose on first day.  Check triglycerides  Check FS 15 min and 1hr after starting TPN and then q6h thereafter.
- continue pca for one day  - pca teaching done  - iv acetaminophen prn  - oob

## 2018-02-16 NOTE — CONSULT NOTE ADULT - ATTENDING COMMENTS
[facility] NEPHROLOGY  Santos Kelsey M.D.  Dallas Rendon D.O.  Amy Montero M.D.  Juana Jones, MSN, ANP-C    Telephone: [phone]  Facsimile: [phone]    [address] [place] [address]

## 2018-02-17 LAB
ALBUMIN SERPL ELPH-MCNC: 2.2 G/DL — LOW (ref 3.5–5)
ALP SERPL-CCNC: 62 U/L — SIGNIFICANT CHANGE UP (ref 40–120)
ALT FLD-CCNC: 16 U/L DA — SIGNIFICANT CHANGE UP (ref 10–60)
ANION GAP SERPL CALC-SCNC: 8 MMOL/L — SIGNIFICANT CHANGE UP (ref 5–17)
AST SERPL-CCNC: 12 U/L — SIGNIFICANT CHANGE UP (ref 10–40)
BILIRUB SERPL-MCNC: 0.5 MG/DL — SIGNIFICANT CHANGE UP (ref 0.2–1.2)
BUN SERPL-MCNC: 9 MG/DL — SIGNIFICANT CHANGE UP (ref 7–18)
CA-I BLD-SCNC: 1.22 MMOL/L — SIGNIFICANT CHANGE UP (ref 1.12–1.3)
CALCIUM SERPL-MCNC: 8.1 MG/DL — LOW (ref 8.4–10.5)
CHLORIDE SERPL-SCNC: 105 MMOL/L — SIGNIFICANT CHANGE UP (ref 96–108)
CO2 SERPL-SCNC: 25 MMOL/L — SIGNIFICANT CHANGE UP (ref 22–31)
CREAT SERPL-MCNC: 0.67 MG/DL — SIGNIFICANT CHANGE UP (ref 0.5–1.3)
CULTURE RESULTS: SIGNIFICANT CHANGE UP
GLUCOSE BLDC GLUCOMTR-MCNC: 112 MG/DL — HIGH (ref 70–99)
GLUCOSE BLDC GLUCOMTR-MCNC: 131 MG/DL — HIGH (ref 70–99)
GLUCOSE SERPL-MCNC: 105 MG/DL — HIGH (ref 70–99)
HCT VFR BLD CALC: 28.5 % — LOW (ref 34.5–45)
HGB BLD-MCNC: 8.7 G/DL — LOW (ref 11.5–15.5)
MAGNESIUM SERPL-MCNC: 2 MG/DL — SIGNIFICANT CHANGE UP (ref 1.6–2.6)
MCHC RBC-ENTMCNC: 27.2 PG — SIGNIFICANT CHANGE UP (ref 27–34)
MCHC RBC-ENTMCNC: 30.7 GM/DL — LOW (ref 32–36)
MCV RBC AUTO: 88.4 FL — SIGNIFICANT CHANGE UP (ref 80–100)
PHOSPHATE SERPL-MCNC: 2.9 MG/DL — SIGNIFICANT CHANGE UP (ref 2.5–4.5)
PLATELET # BLD AUTO: 298 K/UL — SIGNIFICANT CHANGE UP (ref 150–400)
POTASSIUM SERPL-MCNC: 3.3 MMOL/L — LOW (ref 3.5–5.3)
POTASSIUM SERPL-SCNC: 3.3 MMOL/L — LOW (ref 3.5–5.3)
PROT SERPL-MCNC: 6 G/DL — SIGNIFICANT CHANGE UP (ref 6–8.3)
RBC # BLD: 3.22 M/UL — LOW (ref 3.8–5.2)
RBC # FLD: 17.2 % — HIGH (ref 10.3–14.5)
SODIUM SERPL-SCNC: 138 MMOL/L — SIGNIFICANT CHANGE UP (ref 135–145)
SPECIMEN SOURCE: SIGNIFICANT CHANGE UP
TRIGL SERPL-MCNC: 74 MG/DL — SIGNIFICANT CHANGE UP (ref 10–149)
WBC # BLD: 4.1 K/UL — SIGNIFICANT CHANGE UP (ref 3.8–10.5)
WBC # FLD AUTO: 4.1 K/UL — SIGNIFICANT CHANGE UP (ref 3.8–10.5)

## 2018-02-17 RX ORDER — POTASSIUM CHLORIDE 20 MEQ
10 PACKET (EA) ORAL
Qty: 0 | Refills: 0 | Status: COMPLETED | OUTPATIENT
Start: 2018-02-17 | End: 2018-02-17

## 2018-02-17 RX ORDER — I.V. FAT EMULSION 20 G/100ML
0.4 EMULSION INTRAVENOUS
Qty: 23.96 | Refills: 0 | Status: DISCONTINUED | OUTPATIENT
Start: 2018-02-17 | End: 2018-02-17

## 2018-02-17 RX ORDER — ELECTROLYTE SOLUTION,INJ
1 VIAL (ML) INTRAVENOUS
Qty: 0 | Refills: 0 | Status: DISCONTINUED | OUTPATIENT
Start: 2018-02-17 | End: 2018-02-17

## 2018-02-17 RX ADMIN — Medication 1.25 MILLIGRAM(S): at 23:18

## 2018-02-17 RX ADMIN — Medication 1.25 MILLIGRAM(S): at 05:42

## 2018-02-17 RX ADMIN — Medication 100 MILLIEQUIVALENT(S): at 14:01

## 2018-02-17 RX ADMIN — PANTOPRAZOLE SODIUM 40 MILLIGRAM(S): 20 TABLET, DELAYED RELEASE ORAL at 17:16

## 2018-02-17 RX ADMIN — Medication 83 EACH: at 21:30

## 2018-02-17 RX ADMIN — HEPARIN SODIUM 5000 UNIT(S): 5000 INJECTION INTRAVENOUS; SUBCUTANEOUS at 21:27

## 2018-02-17 RX ADMIN — Medication 30 MILLIGRAM(S): at 23:21

## 2018-02-17 RX ADMIN — Medication 30 MILLIGRAM(S): at 23:50

## 2018-02-17 RX ADMIN — PANTOPRAZOLE SODIUM 40 MILLIGRAM(S): 20 TABLET, DELAYED RELEASE ORAL at 05:41

## 2018-02-17 RX ADMIN — HEPARIN SODIUM 5000 UNIT(S): 5000 INJECTION INTRAVENOUS; SUBCUTANEOUS at 05:41

## 2018-02-17 RX ADMIN — Medication 1.25 MILLIGRAM(S): at 11:39

## 2018-02-17 RX ADMIN — I.V. FAT EMULSION 4.99 GM/KG/DAY: 20 EMULSION INTRAVENOUS at 21:27

## 2018-02-17 RX ADMIN — HEPARIN SODIUM 5000 UNIT(S): 5000 INJECTION INTRAVENOUS; SUBCUTANEOUS at 14:24

## 2018-02-17 RX ADMIN — Medication 1.25 MILLIGRAM(S): at 17:16

## 2018-02-17 NOTE — PROGRESS NOTE ADULT - SUBJECTIVE AND OBJECTIVE BOX
GENERAL SURGERY - PROGRESS NOTE    INTERVAL HPI/OVERNIGHT EVENTS: Still has no bowel function - no flatus or BM. PICC placed and is on TPN currently. Pt denies abd pain.   ====================================  MEDICATIONS  (STANDING):  enalaprilat Injectable 1.25 milliGRAM(s) IV Push every 6 hours  fat emulsion (Fish Oil and Plant Based) 20% Infusion 0.4 Gm/kG/Day (4.992 mL/Hr) IV Continuous <Continuous>  fat emulsion (Fish Oil and Plant Based) 20% Infusion 0.4 Gm/kG/Day (4.992 mL/Hr) IV Continuous <Continuous>  heparin  Injectable 5000 Unit(s) SubCutaneous every 8 hours  pantoprazole  Injectable 40 milliGRAM(s) IV Push two times a day  Parenteral Nutrition - Adult 1 Each (83 mL/Hr) TPN Continuous <Continuous>  Parenteral Nutrition - Adult 1 Each (42 mL/Hr) TPN Continuous <Continuous>  sodium chloride 0.9% lock flush 20 milliLiter(s) IV Push once    MEDICATIONS  (PRN):  acetaminophen  IVPB. 1000 milliGRAM(s) IV Intermittent once PRN breakthrough pain  ketorolac   Injectable 30 milliGRAM(s) IV Push every 6 hours PRN Moderate Pain (4 - 6)  sodium chloride 0.9% lock flush 10 milliLiter(s) IV Push every 1 hour PRN After each medication administration  sodium chloride 0.9% lock flush 10 milliLiter(s) IV Push every 12 hours PRN Lumen of catheter NOT used    ====================================  Vital Signs Last 24 Hrs  T(C): 37.2 (17 Feb 2018 06:22), Max: 37.2 (17 Feb 2018 00:17)  T(F): 98.9 (17 Feb 2018 06:22), Max: 99 (17 Feb 2018 00:17)  HR: 68 (17 Feb 2018 06:22) (59 - 69)  BP: 126/79 (17 Feb 2018 06:22) (126/79 - 150/77)  BP(mean): --  RR: 16 (17 Feb 2018 06:22) (16 - 17)  SpO2: 99% (17 Feb 2018 06:22) (99% - 100%)  ====================================  PHYSICAL EXAM:  Gen: alert, comfortable, no distress  HEENT: no scleral icterus  Abd: soft, nondistended nontender - dressing c/d/i without erythema or drainage	  Ext: no edema noted  Skin: no obvious skin rash noted  ====================================  I&O's Detail    16 Feb 2018 07:01  -  17 Feb 2018 07:00  --------------------------------------------------------  IN:    dextrose 5% + sodium chloride 0.45%: 1375 mL  Total IN: 1375 mL    OUT:    Nasoenteral Tube: 1100 mL  Total OUT: 1100 mL    Total NET: 275 mL        ====================================  LABS:                        8.7    4.1   )-----------( 298      ( 17 Feb 2018 07:46 )             28.5     02-17    138  |  105  |  9   ----------------------------<  105<H>  3.3<L>   |  25  |  0.67    Ca    8.1<L>      17 Feb 2018 07:46  Phos  2.9     02-17  Mg     2.0     02-17    TPro  6.0  /  Alb  2.2<L>  /  TBili  0.5  /  DBili  x   /  AST  12  /  ALT  16  /  AlkPhos  62  02-17        ====================================  RADIOLOGY & ADDITIONAL STUDIES:

## 2018-02-17 NOTE — PROGRESS NOTE ADULT - ATTENDING COMMENTS
[facility] NEPHROLOGY  Santos Kelsey M.D.  Dallas Rendon D.O.  Amy Montero M.D.  Juana Jones, MSN, ANP-C    Telephone: [phone]  Facsimile: [phone]    [address] [place] [address]

## 2018-02-17 NOTE — PROGRESS NOTE ADULT - SUBJECTIVE AND OBJECTIVE BOX
[facility] NEPHROLOGY- METABOLIC SUPPORT PROGRESS NOTE    62 year-old woman with severe PCM as a result of SBO with prolonged NPO. Initiated on TPN 2/16,     Hospital Medications: Medications reviewed.  REVIEW OF SYSTEMS:  CONSTITUTIONAL: No fevers or chills  RESPIRATORY: No shortness of breath  CARDIOVASCULAR: No chest pain.  GASTROINTESTINAL: No nausea, vomiting, diarrhea, BM or flatulence  VASCULAR: No bilateral lower extremity edema.     VITALS:  T(F): 97.7 (02-17-18 @ 14:10), Max: 99 (02-17-18 @ 00:17)  HR: 70 (02-17-18 @ 14:10)  BP: 144/76 (02-17-18 @ 14:10)  RR: 16 (02-17-18 @ 14:10)  SpO2: 100% (02-17-18 @ 14:10)  Wt(kg): --  Height (cm): 167.64 (02-08 @ 09:20)  Weight (kg): 59.9 (02-08 @ 09:20)  BMI (kg/m2): 21.3 (02-08 @ 09:20)  BSA (m2): 1.68 (02-08 @ 09:20)    02-16 @ 07:01  -  02-17 @ 07:00  --------------------------------------------------------  IN: 1375 mL / OUT: 1100 mL / NET: 275 mL      PHYSICAL EXAM:  Constitutional: NAD  Neurological: Awake and Alert  HEENT: +NGT  Respiratory: CTAB, no wheezes, rales or rhonchi  Cardiovascular: S1, S2, RRR  Gastrointestinal: BS+, soft, +bandage  : No velazquez.  Extremities: No peripheral edema  Left arm PICC- benign    LABS:  02-17    138  |  105  |  9   ----------------------------<  105<H>  3.3<L>   |  25  |  0.67    Ca    8.1<L>      17 Feb 2018 07:46  Phos  2.9     02-17  Mg     2.0     02-17    TPro  6.0  /  Alb  2.2<L>  /  TBili  0.5  /  DBili      /  AST  12  /  ALT  16  /  AlkPhos  62  02-17    Creatinine Trend: 0.67 <--, 0.70 <--, 0.71 <--, 0.73 <--, 0.76 <--, 0.74 <--, 0.70 <--                        8.7    4.1   )-----------( 298      ( 17 Feb 2018 07:46 )             28.5     Urine Studies:      RADIOLOGY & ADDITIONAL STUDIES:

## 2018-02-18 LAB
ALBUMIN SERPL ELPH-MCNC: 2.1 G/DL — LOW (ref 3.5–5)
ALP SERPL-CCNC: 58 U/L — SIGNIFICANT CHANGE UP (ref 40–120)
ALT FLD-CCNC: 13 U/L DA — SIGNIFICANT CHANGE UP (ref 10–60)
ANION GAP SERPL CALC-SCNC: 7 MMOL/L — SIGNIFICANT CHANGE UP (ref 5–17)
AST SERPL-CCNC: 12 U/L — SIGNIFICANT CHANGE UP (ref 10–40)
BILIRUB SERPL-MCNC: 0.3 MG/DL — SIGNIFICANT CHANGE UP (ref 0.2–1.2)
BUN SERPL-MCNC: 13 MG/DL — SIGNIFICANT CHANGE UP (ref 7–18)
CA-I BLD-SCNC: 1.15 MMOL/L — SIGNIFICANT CHANGE UP (ref 1.12–1.3)
CALCIUM SERPL-MCNC: 7.9 MG/DL — LOW (ref 8.4–10.5)
CHLORIDE SERPL-SCNC: 105 MMOL/L — SIGNIFICANT CHANGE UP (ref 96–108)
CO2 SERPL-SCNC: 27 MMOL/L — SIGNIFICANT CHANGE UP (ref 22–31)
CREAT SERPL-MCNC: 0.67 MG/DL — SIGNIFICANT CHANGE UP (ref 0.5–1.3)
GLUCOSE BLDC GLUCOMTR-MCNC: 122 MG/DL — HIGH (ref 70–99)
GLUCOSE BLDC GLUCOMTR-MCNC: 129 MG/DL — HIGH (ref 70–99)
GLUCOSE BLDC GLUCOMTR-MCNC: 141 MG/DL — HIGH (ref 70–99)
GLUCOSE SERPL-MCNC: 101 MG/DL — HIGH (ref 70–99)
MAGNESIUM SERPL-MCNC: 1.8 MG/DL — SIGNIFICANT CHANGE UP (ref 1.6–2.6)
PHOSPHATE SERPL-MCNC: 3.1 MG/DL — SIGNIFICANT CHANGE UP (ref 2.5–4.5)
POTASSIUM SERPL-MCNC: 3.5 MMOL/L — SIGNIFICANT CHANGE UP (ref 3.5–5.3)
POTASSIUM SERPL-SCNC: 3.5 MMOL/L — SIGNIFICANT CHANGE UP (ref 3.5–5.3)
PROT SERPL-MCNC: 5.9 G/DL — LOW (ref 6–8.3)
SODIUM SERPL-SCNC: 139 MMOL/L — SIGNIFICANT CHANGE UP (ref 135–145)

## 2018-02-18 PROCEDURE — 74018 RADEX ABDOMEN 1 VIEW: CPT | Mod: 26

## 2018-02-18 RX ORDER — I.V. FAT EMULSION 20 G/100ML
0.4 EMULSION INTRAVENOUS
Qty: 23.96 | Refills: 0 | Status: DISCONTINUED | OUTPATIENT
Start: 2018-02-18 | End: 2018-02-18

## 2018-02-18 RX ORDER — ELECTROLYTE SOLUTION,INJ
1 VIAL (ML) INTRAVENOUS
Qty: 0 | Refills: 0 | Status: DISCONTINUED | OUTPATIENT
Start: 2018-02-18 | End: 2018-02-18

## 2018-02-18 RX ADMIN — I.V. FAT EMULSION 4.99 GM/KG/DAY: 20 EMULSION INTRAVENOUS at 22:50

## 2018-02-18 RX ADMIN — HEPARIN SODIUM 5000 UNIT(S): 5000 INJECTION INTRAVENOUS; SUBCUTANEOUS at 14:11

## 2018-02-18 RX ADMIN — HEPARIN SODIUM 5000 UNIT(S): 5000 INJECTION INTRAVENOUS; SUBCUTANEOUS at 21:30

## 2018-02-18 RX ADMIN — Medication 1.25 MILLIGRAM(S): at 11:17

## 2018-02-18 RX ADMIN — Medication 1.25 MILLIGRAM(S): at 17:41

## 2018-02-18 RX ADMIN — PANTOPRAZOLE SODIUM 40 MILLIGRAM(S): 20 TABLET, DELAYED RELEASE ORAL at 17:41

## 2018-02-18 RX ADMIN — Medication 1.25 MILLIGRAM(S): at 06:03

## 2018-02-18 RX ADMIN — Medication 1.25 MILLIGRAM(S): at 23:01

## 2018-02-18 RX ADMIN — Medication 83 EACH: at 23:01

## 2018-02-18 RX ADMIN — PANTOPRAZOLE SODIUM 40 MILLIGRAM(S): 20 TABLET, DELAYED RELEASE ORAL at 06:03

## 2018-02-18 RX ADMIN — HEPARIN SODIUM 5000 UNIT(S): 5000 INJECTION INTRAVENOUS; SUBCUTANEOUS at 06:03

## 2018-02-18 NOTE — PROGRESS NOTE ADULT - SUBJECTIVE AND OBJECTIVE BOX
GENERAL SURGERY - PROGRESS NOTE    INTERVAL HPI/OVERNIGHT EVENTS: Patient admits to flatus overnight. NG output is 1200 x 24 hrs. No abd pain, No nausea/vomiting. TPN is running via PICC LUE.  ====================================  MEDICATIONS  (STANDING):  enalaprilat Injectable 1.25 milliGRAM(s) IV Push every 6 hours  fat emulsion (Fish Oil and Plant Based) 20% Infusion 0.4 Gm/kG/Day (4.992 mL/Hr) IV Continuous <Continuous>  fat emulsion (Fish Oil and Plant Based) 20% Infusion 0.4 Gm/kG/Day (4.992 mL/Hr) IV Continuous <Continuous>  heparin  Injectable 5000 Unit(s) SubCutaneous every 8 hours  pantoprazole  Injectable 40 milliGRAM(s) IV Push two times a day  Parenteral Nutrition - Adult 1 Each (83 mL/Hr) TPN Continuous <Continuous>  Parenteral Nutrition - Adult 1 Each (83 mL/Hr) TPN Continuous <Continuous>  sodium chloride 0.9% lock flush 20 milliLiter(s) IV Push once    MEDICATIONS  (PRN):  acetaminophen  IVPB. 1000 milliGRAM(s) IV Intermittent once PRN breakthrough pain  ketorolac   Injectable 30 milliGRAM(s) IV Push every 6 hours PRN Moderate Pain (4 - 6)  sodium chloride 0.9% lock flush 10 milliLiter(s) IV Push every 1 hour PRN After each medication administration  sodium chloride 0.9% lock flush 10 milliLiter(s) IV Push every 12 hours PRN Lumen of catheter NOT used    ====================================  Vital Signs Last 24 Hrs  T(C): 36.8 (18 Feb 2018 05:53), Max: 37.2 (17 Feb 2018 21:40)  T(F): 98.2 (18 Feb 2018 05:53), Max: 98.9 (17 Feb 2018 21:40)  HR: 68 (18 Feb 2018 05:53) (68 - 82)  BP: 127/79 (18 Feb 2018 05:53) (121/80 - 161/86)  BP(mean): --  RR: 16 (18 Feb 2018 05:53) (16 - 17)  SpO2: 99% (18 Feb 2018 05:53) (99% - 100%)  ====================================  PHYSICAL EXAM:  Gen: alert, comfortable, no distress  HEENT: no scleral icterus  Abd: soft, nondistended nontender - incision c/d/i without erythema or drainage	  Ext: no edema noted  Skin: no obvious skin rash noted  ====================================  I&O's Detail    17 Feb 2018 07:01  -  18 Feb 2018 07:00  --------------------------------------------------------  IN:    fat emulsion (Fish Oil and Plant Based) 20% Infusion: 58.8 mL  Total IN: 58.8 mL    OUT:    Nasoenteral Tube: 1200 mL  Total OUT: 1200 mL    Total NET: -1141.2 mL        ====================================  LABS:                        8.7    4.1   )-----------( 298      ( 17 Feb 2018 07:46 )             28.5     02-18    139  |  105  |  13  ----------------------------<  101<H>  3.5   |  27  |  0.67    Ca    7.9<L>      18 Feb 2018 07:07  Phos  3.1     02-18  Mg     1.8     02-18    TPro  5.9<L>  /  Alb  2.1<L>  /  TBili  0.3  /  DBili  x   /  AST  12  /  ALT  13  /  AlkPhos  58  02-18        ====================================  RADIOLOGY & ADDITIONAL STUDIES:

## 2018-02-18 NOTE — PROGRESS NOTE ADULT - SUBJECTIVE AND OBJECTIVE BOX
[facility] NEPHROLOGY- METABOLIC SUPPORT PROGRESS NOTE    62 year-old woman with severe PCM as a result of SBO with prolonged NPO. Initiated on TPN 2/16,     Hospital Medications: Medications reviewed.  REVIEW OF SYSTEMS:  CONSTITUTIONAL: No fevers or chills  RESPIRATORY: No shortness of breath  CARDIOVASCULAR: No chest pain.  GASTROINTESTINAL: No nausea, vomiting, diarrhea, or BM. +flatulence  VASCULAR: No bilateral lower extremity edema.     VITALS:  T(F): 98.2 (02-18-18 @ 05:53), Max: 98.9 (02-17-18 @ 21:40)  HR: 69 (02-18-18 @ 11:15)  BP: 121/71 (02-18-18 @ 11:15)  RR: 16 (02-18-18 @ 11:15)  SpO2: 100% (02-18-18 @ 11:15)  Wt(kg): --    02-17 @ 07:01  -  02-18 @ 07:00  --------------------------------------------------------  IN: 58.8 mL / OUT: 1200 mL / NET: -1141.2 mL      PHYSICAL EXAM:  Constitutional: NAD  Neurological: Awake and Alert  HEENT: +NGT  Respiratory: CTAB, no wheezes, rales or rhonchi  Cardiovascular: S1, S2, RRR  Gastrointestinal: BS+, soft, +bandage  : No velazquez.  Extremities: No peripheral edema  Left arm PICC- benign    LABS:  02-18    139  |  105  |  13  ----------------------------<  101<H>  3.5   |  27  |  0.67    Ca    7.9<L>      18 Feb 2018 07:07  Phos  3.1     02-18  Mg     1.8     02-18    TPro  5.9<L>  /  Alb  2.1<L>  /  TBili  0.3  /  DBili      /  AST  12  /  ALT  13  /  AlkPhos  58  02-18    Creatinine Trend: 0.67 <--, 0.67 <--, 0.70 <--, 0.71 <--, 0.73 <--, 0.76 <--, 0.74 <--                        8.7    4.1   )-----------( 298      ( 17 Feb 2018 07:46 )             28.5     Urine Studies:

## 2018-02-18 NOTE — PROGRESS NOTE ADULT - ATTENDING COMMENTS
[facility] NEPHROLOGY  Santos Kelsey M.D.  Dallas Rendon D.O.  Amy Montero M.D.  Juana Jones, MSN, ANP-C    Telephone: [phone]  Facsimile: [phone]    [address] [place] [address]

## 2018-02-18 NOTE — CHART NOTE - NSCHARTNOTEFT_GEN_A_CORE
Pt with high output from NGT  + flatus/ no BM  Less distended    Abdominal xray today showing adequate placement of NGT  Dilated loops of small bowel remain    Rec keep NGT  Ambulation    plan for small bowel series on Tuesday

## 2018-02-19 LAB
ALBUMIN SERPL ELPH-MCNC: 2.3 G/DL — LOW (ref 3.5–5)
ALP SERPL-CCNC: 56 U/L — SIGNIFICANT CHANGE UP (ref 40–120)
ALT FLD-CCNC: 16 U/L DA — SIGNIFICANT CHANGE UP (ref 10–60)
ANION GAP SERPL CALC-SCNC: 7 MMOL/L — SIGNIFICANT CHANGE UP (ref 5–17)
AST SERPL-CCNC: 10 U/L — SIGNIFICANT CHANGE UP (ref 10–40)
BILIRUB SERPL-MCNC: 0.3 MG/DL — SIGNIFICANT CHANGE UP (ref 0.2–1.2)
BUN SERPL-MCNC: 15 MG/DL — SIGNIFICANT CHANGE UP (ref 7–18)
CA-I BLD-SCNC: 1.25 MMOL/L — SIGNIFICANT CHANGE UP (ref 1.12–1.3)
CALCIUM SERPL-MCNC: 8 MG/DL — LOW (ref 8.4–10.5)
CHLORIDE SERPL-SCNC: 105 MMOL/L — SIGNIFICANT CHANGE UP (ref 96–108)
CO2 SERPL-SCNC: 28 MMOL/L — SIGNIFICANT CHANGE UP (ref 22–31)
CREAT SERPL-MCNC: 0.6 MG/DL — SIGNIFICANT CHANGE UP (ref 0.5–1.3)
GLUCOSE BLDC GLUCOMTR-MCNC: 119 MG/DL — HIGH (ref 70–99)
GLUCOSE BLDC GLUCOMTR-MCNC: 123 MG/DL — HIGH (ref 70–99)
GLUCOSE BLDC GLUCOMTR-MCNC: 131 MG/DL — HIGH (ref 70–99)
GLUCOSE SERPL-MCNC: 117 MG/DL — HIGH (ref 70–99)
MAGNESIUM SERPL-MCNC: 1.8 MG/DL — SIGNIFICANT CHANGE UP (ref 1.6–2.6)
PHOSPHATE SERPL-MCNC: 3.2 MG/DL — SIGNIFICANT CHANGE UP (ref 2.5–4.5)
POTASSIUM SERPL-MCNC: 3.8 MMOL/L — SIGNIFICANT CHANGE UP (ref 3.5–5.3)
POTASSIUM SERPL-SCNC: 3.8 MMOL/L — SIGNIFICANT CHANGE UP (ref 3.5–5.3)
PROT SERPL-MCNC: 5.9 G/DL — LOW (ref 6–8.3)
SODIUM SERPL-SCNC: 140 MMOL/L — SIGNIFICANT CHANGE UP (ref 135–145)

## 2018-02-19 RX ORDER — ELECTROLYTE SOLUTION,INJ
1 VIAL (ML) INTRAVENOUS
Qty: 0 | Refills: 0 | Status: DISCONTINUED | OUTPATIENT
Start: 2018-02-19 | End: 2018-02-19

## 2018-02-19 RX ORDER — I.V. FAT EMULSION 20 G/100ML
0.4 EMULSION INTRAVENOUS
Qty: 23.96 | Refills: 0 | Status: DISCONTINUED | OUTPATIENT
Start: 2018-02-19 | End: 2018-02-19

## 2018-02-19 RX ADMIN — Medication 1.25 MILLIGRAM(S): at 12:06

## 2018-02-19 RX ADMIN — HEPARIN SODIUM 5000 UNIT(S): 5000 INJECTION INTRAVENOUS; SUBCUTANEOUS at 05:08

## 2018-02-19 RX ADMIN — Medication 1.25 MILLIGRAM(S): at 05:08

## 2018-02-19 RX ADMIN — Medication 1.25 MILLIGRAM(S): at 23:54

## 2018-02-19 RX ADMIN — Medication 1.25 MILLIGRAM(S): at 18:07

## 2018-02-19 RX ADMIN — PANTOPRAZOLE SODIUM 40 MILLIGRAM(S): 20 TABLET, DELAYED RELEASE ORAL at 05:08

## 2018-02-19 RX ADMIN — HEPARIN SODIUM 5000 UNIT(S): 5000 INJECTION INTRAVENOUS; SUBCUTANEOUS at 22:03

## 2018-02-19 RX ADMIN — PANTOPRAZOLE SODIUM 40 MILLIGRAM(S): 20 TABLET, DELAYED RELEASE ORAL at 18:07

## 2018-02-19 RX ADMIN — I.V. FAT EMULSION 4.99 GM/KG/DAY: 20 EMULSION INTRAVENOUS at 22:03

## 2018-02-19 RX ADMIN — Medication 83 EACH: at 22:05

## 2018-02-19 RX ADMIN — HEPARIN SODIUM 5000 UNIT(S): 5000 INJECTION INTRAVENOUS; SUBCUTANEOUS at 15:06

## 2018-02-19 NOTE — PROGRESS NOTE ADULT - SUBJECTIVE AND OBJECTIVE BOX
s/p Ex-Lap & ALBINA 2/12, POD #7      Patient examined sitting up at bedside, no complaints.   No nausea, no vomiting  Reports having flatus and a small BM  Still NPO  NGT to LWS with decreasing output        T(F): 99 (02-19-18 @ 05:04), Max: 99.6 (02-18-18 @ 21:40)  HR: 69 (02-19-18 @ 05:04) (69 - 78)  BP: 121/54 (02-19-18 @ 05:04) (121/54 - 135/76)  RR: 18 (02-19-18 @ 05:04) (14 - 18)  SpO2: 100% (02-19-18 @ 05:04) (98% - 100%)        Nasoenteral Tube: 750 mL      Physical Exam  General: AAOx3, No acute distress  Skin: No jaundice, no icterus  Abdomen: soft, nontender, mildly distended, no rebound tenderness, no guarding, no palpable masses, staples intact, wound closed, no drainage  Extremities: non edematous, no calf pain bilaterally

## 2018-02-19 NOTE — PROGRESS NOTE ADULT - SUBJECTIVE AND OBJECTIVE BOX
[facility] NEPHROLOGY- METABOLIC SUPPORT PROGRESS NOTE    62 year-old woman with severe PCM as a result of SBO with prolonged NPO. Initiated on TPN 2/16,     Hospital Medications: Medications reviewed.  REVIEW OF SYSTEMS:  CONSTITUTIONAL: No fevers or chills  RESPIRATORY: No shortness of breath  CARDIOVASCULAR: No chest pain.  GASTROINTESTINAL: No nausea, vomiting or diarrhea. + BM. +flatulence  VASCULAR: No bilateral lower extremity edema.     VITALS:  T(F): 99 (02-19-18 @ 05:04), Max: 99.6 (02-18-18 @ 21:40)  HR: 69 (02-19-18 @ 05:04)  BP: 121/54 (02-19-18 @ 05:04)  RR: 18 (02-19-18 @ 05:04)  SpO2: 100% (02-19-18 @ 05:04)  Wt(kg): --    02-18 @ 07:01  -  02-19 @ 07:00  --------------------------------------------------------  IN: 0 mL / OUT: 750 mL / NET: -750 mL      PHYSICAL EXAM:  Constitutional: NAD  Neurological: Awake and Alert  HEENT: +NGT  Respiratory: CTAB, no wheezes, rales or rhonchi  Cardiovascular: S1, S2, RRR  Gastrointestinal: BS+, soft, +bandage  : No velazquez.  Extremities: No peripheral edema  Left arm PICC- benign    LABS:  02-19    140  |  105  |  15  ----------------------------<  117<H>  3.8   |  28  |  0.60    Ca    8.0<L>      19 Feb 2018 06:52  Phos  3.2     02-19  Mg     1.8     02-19    TPro  5.9<L>  /  Alb  2.3<L>  /  TBili  0.3  /  DBili      /  AST  10  /  ALT  16  /  AlkPhos  56  02-19    Creatinine Trend: 0.60 <--, 0.67 <--, 0.67 <--, 0.70 <--, 0.71 <--, 0.73 <--, 0.76 <--    Urine Studies:

## 2018-02-19 NOTE — PROGRESS NOTE ADULT - ATTENDING COMMENTS
[facility] NEPHROLOGY  Santos Kelsey M.D.  Dallas Rendon D.O.  Amy Montero M.D.  Juana Jones, MSN, ANP-C    Telephone: [phone]  Facsimile: [phone]    [address] [place] [address]

## 2018-02-20 LAB
ALBUMIN SERPL ELPH-MCNC: 2.2 G/DL — LOW (ref 3.5–5)
ALP SERPL-CCNC: 58 U/L — SIGNIFICANT CHANGE UP (ref 40–120)
ALT FLD-CCNC: 12 U/L DA — SIGNIFICANT CHANGE UP (ref 10–60)
ANION GAP SERPL CALC-SCNC: 6 MMOL/L — SIGNIFICANT CHANGE UP (ref 5–17)
AST SERPL-CCNC: 12 U/L — SIGNIFICANT CHANGE UP (ref 10–40)
BILIRUB SERPL-MCNC: 0.3 MG/DL — SIGNIFICANT CHANGE UP (ref 0.2–1.2)
BUN SERPL-MCNC: 16 MG/DL — SIGNIFICANT CHANGE UP (ref 7–18)
CA-I BLD-SCNC: 1.25 MMOL/L — SIGNIFICANT CHANGE UP (ref 1.12–1.3)
CALCIUM SERPL-MCNC: 8.2 MG/DL — LOW (ref 8.4–10.5)
CHLORIDE SERPL-SCNC: 105 MMOL/L — SIGNIFICANT CHANGE UP (ref 96–108)
CO2 SERPL-SCNC: 28 MMOL/L — SIGNIFICANT CHANGE UP (ref 22–31)
CREAT SERPL-MCNC: 0.65 MG/DL — SIGNIFICANT CHANGE UP (ref 0.5–1.3)
GLUCOSE BLDC GLUCOMTR-MCNC: 107 MG/DL — HIGH (ref 70–99)
GLUCOSE BLDC GLUCOMTR-MCNC: 111 MG/DL — HIGH (ref 70–99)
GLUCOSE BLDC GLUCOMTR-MCNC: 114 MG/DL — HIGH (ref 70–99)
GLUCOSE SERPL-MCNC: 121 MG/DL — HIGH (ref 70–99)
MAGNESIUM SERPL-MCNC: 1.6 MG/DL — SIGNIFICANT CHANGE UP (ref 1.6–2.6)
PHOSPHATE SERPL-MCNC: 3 MG/DL — SIGNIFICANT CHANGE UP (ref 2.5–4.5)
POTASSIUM SERPL-MCNC: 4.4 MMOL/L — SIGNIFICANT CHANGE UP (ref 3.5–5.3)
POTASSIUM SERPL-SCNC: 4.4 MMOL/L — SIGNIFICANT CHANGE UP (ref 3.5–5.3)
PROT SERPL-MCNC: 6.2 G/DL — SIGNIFICANT CHANGE UP (ref 6–8.3)
SODIUM SERPL-SCNC: 139 MMOL/L — SIGNIFICANT CHANGE UP (ref 135–145)

## 2018-02-20 RX ORDER — I.V. FAT EMULSION 20 G/100ML
0.4 EMULSION INTRAVENOUS
Qty: 23.96 | Refills: 0 | Status: DISCONTINUED | OUTPATIENT
Start: 2018-02-20 | End: 2018-02-20

## 2018-02-20 RX ORDER — ELECTROLYTE SOLUTION,INJ
1 VIAL (ML) INTRAVENOUS
Qty: 0 | Refills: 0 | Status: DISCONTINUED | OUTPATIENT
Start: 2018-02-20 | End: 2018-02-20

## 2018-02-20 RX ADMIN — PANTOPRAZOLE SODIUM 40 MILLIGRAM(S): 20 TABLET, DELAYED RELEASE ORAL at 17:51

## 2018-02-20 RX ADMIN — PANTOPRAZOLE SODIUM 40 MILLIGRAM(S): 20 TABLET, DELAYED RELEASE ORAL at 05:44

## 2018-02-20 RX ADMIN — Medication 1.25 MILLIGRAM(S): at 12:52

## 2018-02-20 RX ADMIN — Medication 1 EACH: at 21:27

## 2018-02-20 RX ADMIN — Medication 1.25 MILLIGRAM(S): at 17:51

## 2018-02-20 RX ADMIN — Medication 1.25 MILLIGRAM(S): at 23:31

## 2018-02-20 RX ADMIN — HEPARIN SODIUM 5000 UNIT(S): 5000 INJECTION INTRAVENOUS; SUBCUTANEOUS at 21:24

## 2018-02-20 RX ADMIN — Medication 1.25 MILLIGRAM(S): at 05:44

## 2018-02-20 RX ADMIN — HEPARIN SODIUM 5000 UNIT(S): 5000 INJECTION INTRAVENOUS; SUBCUTANEOUS at 14:47

## 2018-02-20 RX ADMIN — I.V. FAT EMULSION 4.99 GM/KG/DAY: 20 EMULSION INTRAVENOUS at 21:25

## 2018-02-20 RX ADMIN — HEPARIN SODIUM 5000 UNIT(S): 5000 INJECTION INTRAVENOUS; SUBCUTANEOUS at 05:44

## 2018-02-20 NOTE — PROGRESS NOTE ADULT - SUBJECTIVE AND OBJECTIVE BOX
[facility] NEPHROLOGY- METABOLIC SUPPORT PROGRESS NOTE    62 year-old woman with severe PCM as a result of SBO with prolonged NPO. Initiated on TPN 2/16,     Hospital Medications: Medications reviewed.  REVIEW OF SYSTEMS:  CONSTITUTIONAL: No fevers or chills  RESPIRATORY: No shortness of breath  CARDIOVASCULAR: No chest pain.  GASTROINTESTINAL: No nausea, vomiting or diarrhea. No BM today (last BM 2/19). +flatulence  VASCULAR: No bilateral lower extremity edema.     VITALS:  T(F): 98.3 (02-20-18 @ 14:49), Max: 99 (02-19-18 @ 17:44)  HR: 77 (02-20-18 @ 14:49)  BP: 125/77 (02-20-18 @ 14:49)  RR: 17 (02-20-18 @ 14:49)  SpO2: 100% (02-20-18 @ 14:49)  Wt(kg): --    02-19 @ 07:01  -  02-20 @ 07:00  --------------------------------------------------------  IN: 0 mL / OUT: 600 mL / NET: -600 mL      PHYSICAL EXAM:  Constitutional: NAD  Neurological: Awake and Alert  Respiratory: CTAB, no wheezes, rales or rhonchi  Cardiovascular: S1, S2, RRR  Gastrointestinal: BS+, soft, NT  : No velazquez.  Extremities: No peripheral edema  Left arm PICC- benign    LABS:  02-20    139  |  105  |  16  ----------------------------<  121<H>  4.4   |  28  |  0.65    Ca    8.2<L>      20 Feb 2018 05:52  Phos  3.0     02-20  Mg     1.6     02-20    TPro  6.2  /  Alb  2.2<L>  /  TBili  0.3  /  DBili      /  AST  12  /  ALT  12  /  AlkPhos  58  02-20    Creatinine Trend: 0.65 <--, 0.60 <--, 0.67 <--, 0.67 <--, 0.70 <--, 0.71 <--, 0.73 <--    Urine Studies:

## 2018-02-20 NOTE — PROGRESS NOTE ADULT - ATTENDING COMMENTS
As above  NGT drained minimally throughout day, now d/c'd  Comfortable, no N,V    Afebrile,      Abd soft minimally distended, + flatus           Wound clean dry    Imp  SBO resolving    Plan  Consider clear liquids in am

## 2018-02-20 NOTE — PROGRESS NOTE ADULT - ATTENDING COMMENTS
[facility] NEPHROLOGY  Santos Kelsey M.D.  Dallas Rendon D.O.  Amy Montero M.D.  Juana Jones, MSN, ANP-C    Telephone: [phone]  Facsimile: [phone]    [address] [place] [address]

## 2018-02-20 NOTE — PROGRESS NOTE ADULT - SUBJECTIVE AND OBJECTIVE BOX
62y Female with no overnight complaints. passing gas  no bm for 2 days  NGT put out 600cc in 24 hrs on lws    Vital Signs:  T(C): 36.9 (02-20-18 @ 05:41), Max: 37.2 (02-19-18 @ 17:44)  HR: 72 (02-20-18 @ 05:41) (72 - 79)  BP: 120/79 (02-20-18 @ 05:41) (120/79 - 152/94)  RR: 18 (02-20-18 @ 05:41) (18 - 18)  SpO2: 100% (02-20-18 @ 05:41) (97% - 100%)  Wt(kg): --    Physical Exam:  General: NAD, comfortable  Abdomen: softly distended. tympanic. nontender. all staples c/d/i, no active drainage    Ins/Outs:    02-19 @ 07:01  -  02-20 @ 07:00  --------------------------------------------------------  IN:  Total IN: 0 mL    OUT:    Nasoenteral Tube: 600 mL  Total OUT: 600 mL    Total NET: -600 mL

## 2018-02-21 LAB
ALBUMIN SERPL ELPH-MCNC: 2.2 G/DL — LOW (ref 3.5–5)
ALP SERPL-CCNC: 56 U/L — SIGNIFICANT CHANGE UP (ref 40–120)
ALT FLD-CCNC: 13 U/L DA — SIGNIFICANT CHANGE UP (ref 10–60)
ANION GAP SERPL CALC-SCNC: 7 MMOL/L — SIGNIFICANT CHANGE UP (ref 5–17)
AST SERPL-CCNC: 9 U/L — LOW (ref 10–40)
BILIRUB SERPL-MCNC: 0.3 MG/DL — SIGNIFICANT CHANGE UP (ref 0.2–1.2)
BUN SERPL-MCNC: 15 MG/DL — SIGNIFICANT CHANGE UP (ref 7–18)
CA-I BLD-SCNC: 1.27 MMOL/L — SIGNIFICANT CHANGE UP (ref 1.12–1.3)
CALCIUM SERPL-MCNC: 8.1 MG/DL — LOW (ref 8.4–10.5)
CHLORIDE SERPL-SCNC: 106 MMOL/L — SIGNIFICANT CHANGE UP (ref 96–108)
CO2 SERPL-SCNC: 25 MMOL/L — SIGNIFICANT CHANGE UP (ref 22–31)
CREAT SERPL-MCNC: 0.6 MG/DL — SIGNIFICANT CHANGE UP (ref 0.5–1.3)
GLUCOSE SERPL-MCNC: 114 MG/DL — HIGH (ref 70–99)
HCT VFR BLD CALC: 25.7 % — LOW (ref 34.5–45)
HGB BLD-MCNC: 8 G/DL — LOW (ref 11.5–15.5)
MAGNESIUM SERPL-MCNC: 1.6 MG/DL — SIGNIFICANT CHANGE UP (ref 1.6–2.6)
MCHC RBC-ENTMCNC: 27.3 PG — SIGNIFICANT CHANGE UP (ref 27–34)
MCHC RBC-ENTMCNC: 31 GM/DL — LOW (ref 32–36)
MCV RBC AUTO: 88 FL — SIGNIFICANT CHANGE UP (ref 80–100)
PHOSPHATE SERPL-MCNC: 3.3 MG/DL — SIGNIFICANT CHANGE UP (ref 2.5–4.5)
PLATELET # BLD AUTO: 291 K/UL — SIGNIFICANT CHANGE UP (ref 150–400)
POTASSIUM SERPL-MCNC: 4.1 MMOL/L — SIGNIFICANT CHANGE UP (ref 3.5–5.3)
POTASSIUM SERPL-SCNC: 4.1 MMOL/L — SIGNIFICANT CHANGE UP (ref 3.5–5.3)
PROT SERPL-MCNC: 5.9 G/DL — LOW (ref 6–8.3)
RBC # BLD: 2.92 M/UL — LOW (ref 3.8–5.2)
RBC # FLD: 17.7 % — HIGH (ref 10.3–14.5)
SODIUM SERPL-SCNC: 138 MMOL/L — SIGNIFICANT CHANGE UP (ref 135–145)
TRIGL SERPL-MCNC: 39 MG/DL — SIGNIFICANT CHANGE UP (ref 10–149)
WBC # BLD: 5.8 K/UL — SIGNIFICANT CHANGE UP (ref 3.8–10.5)
WBC # FLD AUTO: 5.8 K/UL — SIGNIFICANT CHANGE UP (ref 3.8–10.5)

## 2018-02-21 RX ORDER — I.V. FAT EMULSION 20 G/100ML
0.4 EMULSION INTRAVENOUS
Qty: 23.96 | Refills: 0 | Status: DISCONTINUED | OUTPATIENT
Start: 2018-02-21 | End: 2018-02-21

## 2018-02-21 RX ORDER — ELECTROLYTE SOLUTION,INJ
1 VIAL (ML) INTRAVENOUS
Qty: 0 | Refills: 0 | Status: DISCONTINUED | OUTPATIENT
Start: 2018-02-21 | End: 2018-02-21

## 2018-02-21 RX ADMIN — PANTOPRAZOLE SODIUM 40 MILLIGRAM(S): 20 TABLET, DELAYED RELEASE ORAL at 05:23

## 2018-02-21 RX ADMIN — I.V. FAT EMULSION 4.99 GM/KG/DAY: 20 EMULSION INTRAVENOUS at 21:44

## 2018-02-21 RX ADMIN — Medication 1.25 MILLIGRAM(S): at 11:27

## 2018-02-21 RX ADMIN — Medication 83 EACH: at 21:45

## 2018-02-21 RX ADMIN — HEPARIN SODIUM 5000 UNIT(S): 5000 INJECTION INTRAVENOUS; SUBCUTANEOUS at 05:23

## 2018-02-21 RX ADMIN — PANTOPRAZOLE SODIUM 40 MILLIGRAM(S): 20 TABLET, DELAYED RELEASE ORAL at 17:32

## 2018-02-21 RX ADMIN — Medication 1.25 MILLIGRAM(S): at 17:32

## 2018-02-21 RX ADMIN — HEPARIN SODIUM 5000 UNIT(S): 5000 INJECTION INTRAVENOUS; SUBCUTANEOUS at 21:42

## 2018-02-21 RX ADMIN — HEPARIN SODIUM 5000 UNIT(S): 5000 INJECTION INTRAVENOUS; SUBCUTANEOUS at 15:10

## 2018-02-21 NOTE — PROGRESS NOTE ADULT - ATTENDING COMMENTS
As above,, no vomiting  tolerating clear liquids thus far,      Abd  soft, non tender, mild distention      + flatus      Imp  SBO, resolving  Plan Advance diet as tolerated, if continues to eat, d/c TPN

## 2018-02-21 NOTE — PROGRESS NOTE ADULT - SUBJECTIVE AND OBJECTIVE BOX
s/p Ex-Lap & ALBINA 2/12, POD #9    Patient examined at bedside, reports feeling better today  No nausea, no vomiting  Passing flatus, no BM since this weekend  NGT was removed yesterday  NPO        T(F): 98.2 (02-21-18 @ 05:21), Max: 98.5 (02-20-18 @ 22:22)  HR: 66 (02-21-18 @ 05:21) (64 - 77)  BP: 108/57 (02-21-18 @ 05:21) (108/57 - 135/74)  RR: 17 (02-21-18 @ 05:21) (17 - 18)  SpO2: 100% (02-21-18 @ 05:21) (100% - 100%)  Wt(kg): --          Physical Exam  General: AAOx3, No acute distress  Skin: No jaundice, no icterus  Abdomen: soft, nontender, nondistended, no rebound tenderness, no guarding, no palpable masses, staples intact, wound clean  Extremities: non edematous, no calf pain bilaterally

## 2018-02-21 NOTE — PROGRESS NOTE ADULT - ATTENDING COMMENTS
[facility] NEPHROLOGY  Santos Kelsey M.D.  Dallas Rendon D.O.  Amy Montero M.D.  Juana Jones, MSN, ANP-C    Telephone: [phone]  Facsimile: [phone]    [address] [place] [address]

## 2018-02-21 NOTE — PROGRESS NOTE ADULT - SUBJECTIVE AND OBJECTIVE BOX
[facility] NEPHROLOGY- METABOLIC SUPPORT PROGRESS NOTE    62 year-old woman with severe PCM as a result of SBO with prolonged NPO. Initiated on TPN 2/16,   NGT removed 2/20 and pt started on Clear liquid diet today.     Hospital Medications: Medications reviewed.  REVIEW OF SYSTEMS:  CONSTITUTIONAL: No fevers or chills  RESPIRATORY: No shortness of breath  CARDIOVASCULAR: No chest pain.  GASTROINTESTINAL: No nausea, vomiting or diarrhea. No BM today (last BM 2/19). +flatulence  VASCULAR: No bilateral lower extremity edema.     VITALS:  T(F): 98.2 (02-21-18 @ 05:21), Max: 98.5 (02-20-18 @ 22:22)  HR: 66 (02-21-18 @ 05:21)  BP: 108/57 (02-21-18 @ 05:21)  RR: 17 (02-21-18 @ 05:21)  SpO2: 100% (02-21-18 @ 05:21)  Wt(kg): --      PHYSICAL EXAM:  Constitutional: NAD  Neurological: Awake and Alert  Respiratory: CTAB, no wheezes, rales or rhonchi  Cardiovascular: S1, S2, RRR  Gastrointestinal: BS+, soft, NT  : No velazquez.  Extremities: No peripheral edema  Left arm PICC- benign    LABS:  02-21    138  |  106  |  15  ----------------------------<  114<H>  4.1   |  25  |  0.60    Ca    8.1<L>      21 Feb 2018 07:16  Phos  3.3     02-21  Mg     1.6     02-21    TPro  5.9<L>  /  Alb  2.2<L>  /  TBili  0.3  /  DBili      /  AST  9<L>  /  ALT  13  /  AlkPhos  56  02-21    Creatinine Trend: 0.60 <--, 0.65 <--, 0.60 <--, 0.67 <--, 0.67 <--, 0.70 <--, 0.71 <--                        8.0    5.8   )-----------( 291      ( 21 Feb 2018 07:16 )             25.7     Urine Studies:

## 2018-02-22 LAB
ALBUMIN SERPL ELPH-MCNC: 2.4 G/DL — LOW (ref 3.5–5)
ALP SERPL-CCNC: 66 U/L — SIGNIFICANT CHANGE UP (ref 40–120)
ALT FLD-CCNC: 14 U/L DA — SIGNIFICANT CHANGE UP (ref 10–60)
ANION GAP SERPL CALC-SCNC: 9 MMOL/L — SIGNIFICANT CHANGE UP (ref 5–17)
AST SERPL-CCNC: 11 U/L — SIGNIFICANT CHANGE UP (ref 10–40)
BILIRUB SERPL-MCNC: 0.2 MG/DL — SIGNIFICANT CHANGE UP (ref 0.2–1.2)
BUN SERPL-MCNC: 12 MG/DL — SIGNIFICANT CHANGE UP (ref 7–18)
CA-I BLD-SCNC: 1.34 MMOL/L — HIGH (ref 1.12–1.3)
CALCIUM SERPL-MCNC: 8.6 MG/DL — SIGNIFICANT CHANGE UP (ref 8.4–10.5)
CHLORIDE SERPL-SCNC: 105 MMOL/L — SIGNIFICANT CHANGE UP (ref 96–108)
CO2 SERPL-SCNC: 24 MMOL/L — SIGNIFICANT CHANGE UP (ref 22–31)
CREAT SERPL-MCNC: 0.69 MG/DL — SIGNIFICANT CHANGE UP (ref 0.5–1.3)
GLUCOSE BLDC GLUCOMTR-MCNC: 109 MG/DL — HIGH (ref 70–99)
GLUCOSE BLDC GLUCOMTR-MCNC: 111 MG/DL — HIGH (ref 70–99)
GLUCOSE BLDC GLUCOMTR-MCNC: 116 MG/DL — HIGH (ref 70–99)
GLUCOSE BLDC GLUCOMTR-MCNC: 120 MG/DL — HIGH (ref 70–99)
GLUCOSE BLDC GLUCOMTR-MCNC: 124 MG/DL — HIGH (ref 70–99)
GLUCOSE SERPL-MCNC: 115 MG/DL — HIGH (ref 70–99)
HCT VFR BLD CALC: 27.6 % — LOW (ref 34.5–45)
HGB BLD-MCNC: 8.6 G/DL — LOW (ref 11.5–15.5)
MAGNESIUM SERPL-MCNC: 1.8 MG/DL — SIGNIFICANT CHANGE UP (ref 1.6–2.6)
MCHC RBC-ENTMCNC: 27.5 PG — SIGNIFICANT CHANGE UP (ref 27–34)
MCHC RBC-ENTMCNC: 31.1 GM/DL — LOW (ref 32–36)
MCV RBC AUTO: 88.3 FL — SIGNIFICANT CHANGE UP (ref 80–100)
PHOSPHATE SERPL-MCNC: 3 MG/DL — SIGNIFICANT CHANGE UP (ref 2.5–4.5)
PLATELET # BLD AUTO: 309 K/UL — SIGNIFICANT CHANGE UP (ref 150–400)
POTASSIUM SERPL-MCNC: 4.3 MMOL/L — SIGNIFICANT CHANGE UP (ref 3.5–5.3)
POTASSIUM SERPL-SCNC: 4.3 MMOL/L — SIGNIFICANT CHANGE UP (ref 3.5–5.3)
PROT SERPL-MCNC: 6.7 G/DL — SIGNIFICANT CHANGE UP (ref 6–8.3)
RBC # BLD: 3.12 M/UL — LOW (ref 3.8–5.2)
RBC # FLD: 17.9 % — HIGH (ref 10.3–14.5)
SODIUM SERPL-SCNC: 138 MMOL/L — SIGNIFICANT CHANGE UP (ref 135–145)
WBC # BLD: 6.2 K/UL — SIGNIFICANT CHANGE UP (ref 3.8–10.5)
WBC # FLD AUTO: 6.2 K/UL — SIGNIFICANT CHANGE UP (ref 3.8–10.5)

## 2018-02-22 RX ORDER — I.V. FAT EMULSION 20 G/100ML
0.4 EMULSION INTRAVENOUS
Qty: 23.96 | Refills: 0 | Status: DISCONTINUED | OUTPATIENT
Start: 2018-02-22 | End: 2018-02-23

## 2018-02-22 RX ORDER — ELECTROLYTE SOLUTION,INJ
1 VIAL (ML) INTRAVENOUS
Qty: 0 | Refills: 0 | Status: DISCONTINUED | OUTPATIENT
Start: 2018-02-22 | End: 2018-02-23

## 2018-02-22 RX ADMIN — HEPARIN SODIUM 5000 UNIT(S): 5000 INJECTION INTRAVENOUS; SUBCUTANEOUS at 14:00

## 2018-02-22 RX ADMIN — PANTOPRAZOLE SODIUM 40 MILLIGRAM(S): 20 TABLET, DELAYED RELEASE ORAL at 05:53

## 2018-02-22 RX ADMIN — PANTOPRAZOLE SODIUM 40 MILLIGRAM(S): 20 TABLET, DELAYED RELEASE ORAL at 17:28

## 2018-02-22 RX ADMIN — HEPARIN SODIUM 5000 UNIT(S): 5000 INJECTION INTRAVENOUS; SUBCUTANEOUS at 05:53

## 2018-02-22 RX ADMIN — I.V. FAT EMULSION 4.99 GM/KG/DAY: 20 EMULSION INTRAVENOUS at 21:50

## 2018-02-22 RX ADMIN — HEPARIN SODIUM 5000 UNIT(S): 5000 INJECTION INTRAVENOUS; SUBCUTANEOUS at 21:38

## 2018-02-22 RX ADMIN — Medication 1.25 MILLIGRAM(S): at 17:29

## 2018-02-22 NOTE — PROGRESS NOTE ADULT - SUBJECTIVE AND OBJECTIVE BOX
62y Female with no overnight complaints. Tolerating clears  +flatus/bms    Vital Signs:  T(C): 36.7 (02-22-18 @ 06:02), Max: 37.3 (02-21-18 @ 21:48)  HR: 70 (02-22-18 @ 06:02) (64 - 73)  BP: 100/62 (02-22-18 @ 06:02) (94/71 - 101/63)  RR: 16 (02-22-18 @ 06:02) (16 - 17)  SpO2: 100% (02-22-18 @ 06:02) (100% - 100%)  Wt(kg): --    Physical Exam:  General: NAD, comfortable  Abdomen: soft, less distended, nontender. staples c/d/i

## 2018-02-22 NOTE — PROGRESS NOTE ADULT - SUBJECTIVE AND OBJECTIVE BOX
[facility] NEPHROLOGY- PROGRESS NOTE, Follow up     Patient is a 62y Female with severe PCM as a result of SBO with prolonged NPO. Initiated on TPN 2/16,   NGT removed 2/20 and pt started on Clear liquid diet today.   Allergy:  No Known Allergies    Hospital Medications:   MEDICATIONS  (STANDING):  enalaprilat Injectable 1.25 milliGRAM(s) IV Push every 6 hours  fat emulsion (Fish Oil and Plant Based) 20% Infusion 0.4 Gm/kG/Day (4.992 mL/Hr) IV Continuous <Continuous>  fat emulsion (Fish Oil and Plant Based) 20% Infusion 0.4 Gm/kG/Day (4.992 mL/Hr) IV Continuous <Continuous>  heparin  Injectable 5000 Unit(s) SubCutaneous every 8 hours  pantoprazole  Injectable 40 milliGRAM(s) IV Push two times a day  Parenteral Nutrition - Adult 1 Each (42 mL/Hr) TPN Continuous <Continuous>  Parenteral Nutrition - Adult 1 Each (83 mL/Hr) TPN Continuous <Continuous>  sodium chloride 0.9% lock flush 20 milliLiter(s) IV Push once    REVIEW OF SYSTEMS:  CONSTITUTIONAL: No fevers or chills  RESPIRATORY: No shortness of breath  CARDIOVASCULAR: No chest pain.  GASTROINTESTINAL: No nausea, vomiting or diarrhea. No BM today (last BM 2/19). +flatulence  VASCULAR: No bilateral lower extremity edema.     VITALS:  T(F): 97.3 (02-22-18 @ 10:00), Max: 99.2 (02-21-18 @ 21:48)  HR: 80 (02-22-18 @ 10:00)  BP: 106/65 (02-22-18 @ 10:00)  RR: 20 (02-22-18 @ 10:00)  SpO2: 99% (02-22-18 @ 10:00)  Wt(kg): --      PHYSICAL EXAM:  Constitutional: NAD  Neurological: Awake and Alert  Respiratory: CTAB, no wheezes, rales or rhonchi  Cardiovascular: S1, S2, RRR  Gastrointestinal: BS+, soft, NT  : No velazquez.  Extremities: No peripheral edema  Left arm PICC- benign        LABS:  02-22    138  |  105  |  12  ----------------------------<  115<H>  4.3   |  24  |  0.69    Ca    8.6      22 Feb 2018 07:10  Phos  3.0     02-22  Mg     1.8     02-22    TPro  6.7  /  Alb  2.4<L>  /  TBili  0.2  /  DBili  x   /  AST  11  /  ALT  14  /  AlkPhos  66  02-22    Creatinine Trend: 0.69 <--, 0.60 <--, 0.65 <--, 0.60 <--, 0.67 <--, 0.67 <--, 0.70 <--                        8.6    6.2   )-----------( 309      ( 22 Feb 2018 07:10 )             27.6     Urine Studies:      RADIOLOGY & ADDITIONAL STUDIES: [facility] NEPHROLOGY- PROGRESS NOTE, Follow up     Patient is a 62y Female with severe PCM as a result of SBO with prolonged NPO. Initiated on TPN 2/16,   NGT removed 2/20 and pt started on Clear liquid diet 2/21, now on regular diet.      Allergy:  No Known Allergies    Hospital Medications:   MEDICATIONS  (STANDING):  enalaprilat Injectable 1.25 milliGRAM(s) IV Push every 6 hours  fat emulsion (Fish Oil and Plant Based) 20% Infusion 0.4 Gm/kG/Day (4.992 mL/Hr) IV Continuous <Continuous>  fat emulsion (Fish Oil and Plant Based) 20% Infusion 0.4 Gm/kG/Day (4.992 mL/Hr) IV Continuous <Continuous>  heparin  Injectable 5000 Unit(s) SubCutaneous every 8 hours  pantoprazole  Injectable 40 milliGRAM(s) IV Push two times a day  Parenteral Nutrition - Adult 1 Each (42 mL/Hr) TPN Continuous <Continuous>  Parenteral Nutrition - Adult 1 Each (83 mL/Hr) TPN Continuous <Continuous>  sodium chloride 0.9% lock flush 20 milliLiter(s) IV Push once    REVIEW OF SYSTEMS:  CONSTITUTIONAL: No fevers or chills  RESPIRATORY: No shortness of breath  CARDIOVASCULAR: No chest pain.  GASTROINTESTINAL: No nausea, vomiting or diarrhea. No BM today (last BM 2/19). +flatulence  VASCULAR: No bilateral lower extremity edema.     VITALS:  T(F): 97.3 (02-22-18 @ 10:00), Max: 99.2 (02-21-18 @ 21:48)  HR: 80 (02-22-18 @ 10:00)  BP: 106/65 (02-22-18 @ 10:00)  RR: 20 (02-22-18 @ 10:00)  SpO2: 99% (02-22-18 @ 10:00)  Wt(kg): --      PHYSICAL EXAM:  Constitutional: NAD  Neurological: Awake and Alert  Respiratory: CTAB, no wheezes, rales or rhonchi  Cardiovascular: S1, S2, RRR  Gastrointestinal: BS+, soft, NT  : No velazquez.  Extremities: No peripheral edema  Left arm PICC- benign        LABS:  02-22    138  |  105  |  12  ----------------------------<  115<H>  4.3   |  24  |  0.69    Ca    8.6      22 Feb 2018 07:10  Phos  3.0     02-22  Mg     1.8     02-22    TPro  6.7  /  Alb  2.4<L>  /  TBili  0.2  /  DBili  x   /  AST  11  /  ALT  14  /  AlkPhos  66  02-22    Creatinine Trend: 0.69 <--, 0.60 <--, 0.65 <--, 0.60 <--, 0.67 <--, 0.67 <--, 0.70 <--                        8.6    6.2   )-----------( 309      ( 22 Feb 2018 07:10 )             27.6     Urine Studies:      RADIOLOGY & ADDITIONAL STUDIES:

## 2018-02-22 NOTE — PROGRESS NOTE ADULT - ATTENDING COMMENTS
[facility] NEPHROLOGY  Santos Kelsey M.D.  Dallas Rendon D.O.  Amy Montero M.D.  Juana Jones, MSN, ANP-C    Telephone: [phone]  Facsimile: [phone]    [address] [place] [address] Patient seen and examined. Agree with plan above.  Note edited as necessary.  Pt now on regular diet. Will taper TPN to 1L total volume today and then d/c TPN  [facility] NEPHROLOGY  Santos Kelsey M.D.  D[place] Rendon D.O.  Amy Montero M.D.  Juana Jones, MSN, ANP-C    Telephone: [phone]  Facsimile: [phone]    [address] [place] [address]

## 2018-02-23 ENCOUNTER — TRANSCRIPTION ENCOUNTER (OUTPATIENT)
Age: 63
End: 2018-02-23

## 2018-02-23 VITALS
DIASTOLIC BLOOD PRESSURE: 79 MMHG | SYSTOLIC BLOOD PRESSURE: 133 MMHG | OXYGEN SATURATION: 100 % | HEART RATE: 94 BPM | RESPIRATION RATE: 16 BRPM | TEMPERATURE: 98 F

## 2018-02-23 LAB
GLUCOSE BLDC GLUCOMTR-MCNC: 108 MG/DL — HIGH (ref 70–99)
GLUCOSE BLDC GLUCOMTR-MCNC: 113 MG/DL — HIGH (ref 70–99)

## 2018-02-23 PROCEDURE — 86901 BLOOD TYPING SEROLOGIC RH(D): CPT

## 2018-02-23 PROCEDURE — 71045 X-RAY EXAM CHEST 1 VIEW: CPT

## 2018-02-23 PROCEDURE — 83605 ASSAY OF LACTIC ACID: CPT

## 2018-02-23 PROCEDURE — 80048 BASIC METABOLIC PNL TOTAL CA: CPT

## 2018-02-23 PROCEDURE — 96374 THER/PROPH/DIAG INJ IV PUSH: CPT

## 2018-02-23 PROCEDURE — 85730 THROMBOPLASTIN TIME PARTIAL: CPT

## 2018-02-23 PROCEDURE — 83690 ASSAY OF LIPASE: CPT

## 2018-02-23 PROCEDURE — 76937 US GUIDE VASCULAR ACCESS: CPT

## 2018-02-23 PROCEDURE — 36415 COLL VENOUS BLD VENIPUNCTURE: CPT

## 2018-02-23 PROCEDURE — 87070 CULTURE OTHR SPECIMN AEROBIC: CPT

## 2018-02-23 PROCEDURE — 81001 URINALYSIS AUTO W/SCOPE: CPT

## 2018-02-23 PROCEDURE — 82330 ASSAY OF CALCIUM: CPT

## 2018-02-23 PROCEDURE — 84478 ASSAY OF TRIGLYCERIDES: CPT

## 2018-02-23 PROCEDURE — 85027 COMPLETE CBC AUTOMATED: CPT

## 2018-02-23 PROCEDURE — 99285 EMERGENCY DEPT VISIT HI MDM: CPT | Mod: 25

## 2018-02-23 PROCEDURE — 88305 TISSUE EXAM BY PATHOLOGIST: CPT

## 2018-02-23 PROCEDURE — 86900 BLOOD TYPING SEROLOGIC ABO: CPT

## 2018-02-23 PROCEDURE — 82962 GLUCOSE BLOOD TEST: CPT

## 2018-02-23 PROCEDURE — 84100 ASSAY OF PHOSPHORUS: CPT

## 2018-02-23 PROCEDURE — 87075 CULTR BACTERIA EXCEPT BLOOD: CPT

## 2018-02-23 PROCEDURE — 80053 COMPREHEN METABOLIC PANEL: CPT

## 2018-02-23 PROCEDURE — C1751: CPT

## 2018-02-23 PROCEDURE — 74018 RADEX ABDOMEN 1 VIEW: CPT

## 2018-02-23 PROCEDURE — 77001 FLUOROGUIDE FOR VEIN DEVICE: CPT

## 2018-02-23 PROCEDURE — 36569 INSJ PICC 5 YR+ W/O IMAGING: CPT

## 2018-02-23 PROCEDURE — 83735 ASSAY OF MAGNESIUM: CPT

## 2018-02-23 PROCEDURE — 74177 CT ABD & PELVIS W/CONTRAST: CPT

## 2018-02-23 PROCEDURE — 74019 RADEX ABDOMEN 2 VIEWS: CPT

## 2018-02-23 PROCEDURE — 86850 RBC ANTIBODY SCREEN: CPT

## 2018-02-23 PROCEDURE — 85610 PROTHROMBIN TIME: CPT

## 2018-02-23 PROCEDURE — 93005 ELECTROCARDIOGRAM TRACING: CPT

## 2018-02-23 RX ORDER — IBUPROFEN 200 MG
1 TABLET ORAL
Qty: 20 | Refills: 0 | OUTPATIENT
Start: 2018-02-23 | End: 2018-02-27

## 2018-02-23 RX ADMIN — Medication 1.25 MILLIGRAM(S): at 00:30

## 2018-02-23 RX ADMIN — PANTOPRAZOLE SODIUM 40 MILLIGRAM(S): 20 TABLET, DELAYED RELEASE ORAL at 06:31

## 2018-02-23 RX ADMIN — HEPARIN SODIUM 5000 UNIT(S): 5000 INJECTION INTRAVENOUS; SUBCUTANEOUS at 06:31

## 2018-02-23 NOTE — DISCHARGE NOTE ADULT - PLAN OF CARE
Wound healing, tolerate diet Please follow up with Dr. Guerrero within 1 week   No heavy lifting or straining   Diet as tolerated   add ensure shakes

## 2018-02-23 NOTE — PROGRESS NOTE ADULT - PROVIDER SPECIALTY LIST ADULT
Nephrology
Surgery
Nephrology

## 2018-02-23 NOTE — DISCHARGE NOTE ADULT - HOSPITAL COURSE
62 y.o. F w/ PMH multiple SBO s/p SB resection in 2015 presents to [facility] ER c/o vomiting since last night. Pt states her last meal was lunch time, consisting of corn beef and rice. Associated with only mild upper abdominal discomfort. Last BM 2 days ago, which is abnormal for pt as she usually has daily BM's. Last [facility] admission April 2017 for SBO, which resolved with conservative management. Denies fever, chills, diarrhea, dysuria. Last colonoscopy last year, which pt states was normal.     Patient was admitted and managed conservatively with NPO and NGT. Patient clinically did not improve and remained obstructed. Patient was taken to the OR on 2/12 abd underwent exploratory laparotomy with lysis of adhesions. Patient tolerated procedure well. Post op course was complicated by ileus. Patient was found to be malnourished. TPN was initiated. Patient had return of bowel function, diet was advanced and tolerated. TPN was discontinued. Patient for d/c home with outpatient follow up

## 2018-02-23 NOTE — PROGRESS NOTE ADULT - PROBLEM SELECTOR PLAN 4
Resolved with increased potassium in TPN. Monitor serum K.
Resolved with TPN. Monitor serum K.
Resolved with increased potassium in TPN. Monitor serum K.
Resolved. Monitor serum K.
Will increase potassium in TPN. Monitor serum K.

## 2018-02-23 NOTE — DISCHARGE NOTE ADULT - MEDICATION SUMMARY - MEDICATIONS TO TAKE
I will START or STAY ON the medications listed below when I get home from the hospital:     mg oral tablet  -- 1 tab(s) by mouth every 6 hours, As Needed -for moderate pain MDD:4 tabs   -- Do not take this drug if you are pregnant.  It is very important that you take or use this exactly as directed.  Do not skip doses or discontinue unless directed by your doctor.  May cause drowsiness or dizziness.  Obtain medical advice before taking any non-prescription drugs as some may affect the action of this medication.  Take with food or milk.    -- Indication: For prn pain

## 2018-02-23 NOTE — PROGRESS NOTE ADULT - ATTENDING COMMENTS
Pt now off TPN. Will sign off. Please re-consult if needed.  [facility] NEPHROLOGY  Santos Kelsey M.D.  D[place] Rendon D.O.  Amy Montero M.D.  Juana Jones, MSN, ANP-C    Telephone: [phone]  Facsimile: [phone]    [address] [place] [address]

## 2018-02-23 NOTE — PROGRESS NOTE ADULT - PROBLEM SELECTOR PLAN 1
Initiated TPN on 2/16. Will c/w TPN with lipids  Last TG 74. Glucose FS well controlled. Check glucose FS q 6hrs.   Monitor CMP/ Mg/ Phos. Check daily weights.
1. NPO/ IVF hydration   2. f/u am labs   3. strict I and O   4. vasotec for hypertension  5. abd xray   may need surgery if does not improve   D/w Dr. Guerrero
1. NPO/IVF  2. OOB   3. PCA pain control  awaiting bowel function
1. d/c tpn   2. d/c picc   3. d/c home  4. regular diet with supplements
Initiated TPN on 2/16. Now on po diet, tolerated breakfast.  Decreased TPN, c/w with lipids. Plan to taper off TPN.  Last TG 39 Glucose FS well controlled. Check glucose FS q 6hrs.   Monitor CMP/ Mg/ Phos. Check daily weights.
Initiated TPN on 2/16. Pt now tolerating regular diet. Will d/c TPN (had reduced dose of TPN yesterday to taper off TPN). Left arm PICC being removed currently.   Recc to increase protein intake.
Initiated TPN on 2/16. Will c/w TPN with lipids  Last TG 74. Glucose FS well controlled. Check glucose FS q 6hrs.   Monitor CMP/ Mg/ Phos. Check daily weights.
Initiated TPN on 2/16. Will c/w TPN with lipids  Last TG 74. Glucose FS well controlled. Check glucose FS q 6hrs.   Monitor CMP/ Mg/ Phos. Check daily weights.
Initiated TPN on 2/16. Will c/w TPN with lipids today but will increase volume to 2L. Last TG 74. Check glucose FS q 6hrs.   Monitor CMP/ Mg/ Phos. Check daily weights.
Initiated TPN on 2/16. Will c/w TPN with lipids. Last TG 39 Glucose FS well controlled. Check glucose FS q 6hrs. Pt now on CLD. Once pt is tolerating solids will taper off TPN.   Monitor CMP/ Mg/ Phos. Check daily weights.
Plan for ex-lap today  keep NGT to LWS  start PPI  DVT ppx  to be consented
keep NGT to LWS  IVF  OOB ambulate  pain control prn  DVT ppx

## 2018-02-23 NOTE — PROGRESS NOTE ADULT - SUBJECTIVE AND OBJECTIVE BOX
63 y/o female s/p Ex-Lap, ALBINA (SBO) 2/12. Patient examined at bedside, no complaints.   No nausea, no vomiting  Tolerating diet  + bm and flatus   T(F): 98.7 (02-23-18 @ 06:00), Max: 98.7 (02-23-18 @ 06:00)  HR: 89 (02-23-18 @ 06:00) (80 - 89)  BP: 103/62 (02-23-18 @ 06:00) (103/62 - 116/70)  RR: 16 (02-23-18 @ 06:00) (16 - 20)  SpO2: 100% (02-23-18 @ 06:00) (99% - 100%)  Wt(kg): --      02-22 @ 07:01  -  02-23 @ 07:00  --------------------------------------------------------  IN:    fat emulsion (Fish Oil and Plant Based) 20% Infusion: 44.1 mL  Total IN: 44.1 mL    OUT:  Total OUT: 0 mL    Total NET: 44.1 mL                            8.6    6.2   )-----------( 309      ( 22 Feb 2018 07:10 )             27.6   02-22    138  |  105  |  12  ----------------------------<  115<H>  4.3   |  24  |  0.69    Ca    8.6      22 Feb 2018 07:10  Phos  3.0     02-22  Mg     1.8     02-22    TPro  6.7  /  Alb  2.4<L>  /  TBili  0.2  /  DBili  x   /  AST  11  /  ALT  14  /  AlkPhos  66  02-22          Physical Exam  General: AAOx3, No acute distress  Skin: No jaundice, no icterus  Abdomen: soft, nontender, nondistended, no rebound tenderness, no guarding, no palpable masses, incision clean dry, staples intact   : Normal external genitalia  Extremities: non edematous, no calf pain bilaterally

## 2018-02-23 NOTE — PROGRESS NOTE ADULT - SUBJECTIVE AND OBJECTIVE BOX
[facility] NEPHROLOGY- METABOLIC SUPPORT PROGRESS NOTE    62 year-old woman with severe PCM as a result of SBO with prolonged NPO. Initiated on TPN 2/16,   NGT removed 2/20. Pt now on regular diet. TPN cut in 1/2 yesterday and now off.     Hospital Medications: Medications reviewed.  REVIEW OF SYSTEMS:  CONSTITUTIONAL: No fevers or chills  RESPIRATORY: No shortness of breath  CARDIOVASCULAR: No chest pain.  GASTROINTESTINAL: No nausea, vomiting or diarrhea.   VASCULAR: No bilateral lower extremity edema.     VITALS:  T(F): 98.7 (02-23-18 @ 06:00), Max: 98.7 (02-23-18 @ 06:00)  HR: 89 (02-23-18 @ 06:00)  BP: 103/62 (02-23-18 @ 06:00)  RR: 16 (02-23-18 @ 06:00)  SpO2: 100% (02-23-18 @ 06:00)  Wt(kg): --    02-22 @ 07:01  -  02-23 @ 07:00  --------------------------------------------------------  IN: 44.1 mL / OUT: 0 mL / NET: 44.1 mL      PHYSICAL EXAM:  Constitutional: NAD  Neurological: Awake and Alert  Respiratory: CTAB, no wheezes, rales or rhonchi  Cardiovascular: S1, S2, RRR  Gastrointestinal: BS+, soft, NT  : No velazquez.  Extremities: No peripheral edema  Left arm PICC- benign (currently being removed)    LABS:  02-22    138  |  105  |  12  ----------------------------<  115<H>  4.3   |  24  |  0.69    Ca    8.6      22 Feb 2018 07:10  Phos  3.0     02-22  Mg     1.8     02-22    TPro  6.7  /  Alb  2.4<L>  /  TBili  0.2  /  DBili      /  AST  11  /  ALT  14  /  AlkPhos  66  02-22    Creatinine Trend: 0.69 <--, 0.60 <--, 0.65 <--, 0.60 <--, 0.67 <--, 0.67 <--                        8.6    6.2   )-----------( 309      ( 22 Feb 2018 07:10 )             27.6     Urine Studies:

## 2018-02-23 NOTE — PROGRESS NOTE ADULT - PROBLEM SELECTOR PROBLEM 2
SBO (small bowel obstruction)

## 2018-02-23 NOTE — PROGRESS NOTE ADULT - ASSESSMENT
62 year old female with SBO    1) continue npo and iv fluids  2) strict I's and O's   3) oob to chair  4) continue NG tube to suction
62 year-old woman with severe PCM as a result of SBO with prolonged NPO.  HTN controlled with enalaprilat.  Mild hypokalemia
62F with SBO. NGT, IVF.  1) monitor U/O and bolus if needed  2) patient refusing surgery at this time--exaplained that if no improvement she we need intervention as it is the only option.
61 y/o female admitted with SBO. remains obstructed, high NGT output
61 y/o female s/p Ex-Lap & ALBINA with post-op ileus      1. Keep NGT to LWS  2. Keep NPO  3. Out of bed  4. DVT PPx  5. IVF  6. If NGT output continues to decrease, place is to remove the NGT tomorrow as long as patient continues to pass flatus and have BMs
61 y/o female s/p Ex-Lap, ALBINA (SBO) 2/12.
61 y/o female s/p exploratory laparotomy POD # 1.
62 year-old woman with severe PCM as a result of SBO with prolonged NPO.  HTN controlled with enalaprilat.  Mild hypokalemia
62F with non-resolving SBO.    1) continue NGT/NPO  2) possible surgery Monday if no resolution  3) f/u AXR
62y.o. Female s/p ex-lap, lysis of adhesions POD#3
62y.o. Female w/ recurrent SBO
63 y/o female s/p Ex-Lap & ALBINA      1. clears today  2. out of bed  3. DVT PPx
s/p Ex-Joanna, ALBINA (SBO) 2/12    1- advance to reg diet  2- taper TPN  3- d/c planning for tomorrow am
s/p ex lap and lysis of adhesions for SBO.  Postop ileus persisting. No bowel function yet    - TPN  - NPO  - encourage ambulation  - dvt ppx with SQH and SCD
s/p ex lap for lysis of adhesions for SBO  Slow return of bowel function    - continue TPN  - abd xray today  - encourage ambulation  - DVT ppx
s/p ex lap, ALBINA (SBO) POD#2    1- keep NGT to lws  2- d/c velazquez catheter today, TOV  3- OOB/Ambulate  4- d/c PCA today
s/p ex lap, ALBINA for SBO on 2/12    1- put NGT to bsb today  2- if drainage decreases then possibly remove NGT later today  3- OOB and ambulate  4- continue TPN
s/p exploratory laparotomy, lysis of adhesions POD#4  1. plan for TPN  2. NPO  3. Keep NGT   4. OOB to chair  5. Incentive spirometry  6. DVT prophylaxis

## 2018-02-23 NOTE — PROGRESS NOTE ADULT - PROBLEM SELECTOR PROBLEM 1
Severe protein-calorie malnutrition
SBO (small bowel obstruction)
S/P exploratory laparotomy
SBO (small bowel obstruction)
Severe protein-calorie malnutrition

## 2018-02-23 NOTE — PROGRESS NOTE ADULT - PROBLEM SELECTOR PLAN 3
BP controlled. Continue with current anti-hypertensive medications. Monitor BP.
BP controlled. Change to Enalapril 2.5 mg PO daily. Monitor BP.
BP controlled. Continue with current anti-hypertensive medications. Monitor BP.

## 2018-02-23 NOTE — DISCHARGE NOTE ADULT - CARE PLAN
Principal Discharge DX:	SBO (small bowel obstruction)  Goal:	Wound healing, tolerate diet  Assessment and plan of treatment:	Please follow up with Dr. Guerrero within 1 week   No heavy lifting or straining   Diet as tolerated   add ensure shakes

## 2018-02-23 NOTE — PROGRESS NOTE ADULT - PROBLEM SELECTOR PLAN 2
mgmt per surgery.
Resolved. mgmt per surgery.
mgmt per surgery.

## 2018-02-23 NOTE — DISCHARGE NOTE ADULT - PATIENT PORTAL LINK FT
You can access the [facility] Patient Portal, offered by [facility], by registering with the following website: [email or web address]

## 2018-03-06 ENCOUNTER — APPOINTMENT (OUTPATIENT)
Dept: SURGERY | Facility: CLINIC | Age: 63
End: 2018-03-06
Payer: MEDICAID

## 2018-03-06 VITALS
BODY MASS INDEX: 18.68 KG/M2 | DIASTOLIC BLOOD PRESSURE: 71 MMHG | HEART RATE: 87 BPM | HEIGHT: 67 IN | WEIGHT: 119 LBS | SYSTOLIC BLOOD PRESSURE: 116 MMHG | TEMPERATURE: 98.2 F

## 2018-03-06 PROCEDURE — 99024 POSTOP FOLLOW-UP VISIT: CPT

## 2018-03-20 ENCOUNTER — APPOINTMENT (OUTPATIENT)
Dept: SURGERY | Facility: CLINIC | Age: 63
End: 2018-03-20
Payer: MEDICAID

## 2018-03-20 ENCOUNTER — INPATIENT (INPATIENT)
Facility: HOSPITAL | Age: 63
LOS: 0 days | Discharge: HOME | DRG: 920 | End: 2018-03-21
Attending: INTERNAL MEDICINE | Admitting: INTERNAL MEDICINE
Payer: MEDICAID

## 2018-03-20 VITALS
DIASTOLIC BLOOD PRESSURE: 81 MMHG | HEIGHT: 67 IN | HEART RATE: 96 BPM | WEIGHT: 119 LBS | TEMPERATURE: 98.5 F | SYSTOLIC BLOOD PRESSURE: 127 MMHG | BODY MASS INDEX: 18.68 KG/M2

## 2018-03-20 VITALS
TEMPERATURE: 98 F | RESPIRATION RATE: 18 BRPM | SYSTOLIC BLOOD PRESSURE: 123 MMHG | HEART RATE: 90 BPM | OXYGEN SATURATION: 100 % | DIASTOLIC BLOOD PRESSURE: 81 MMHG | WEIGHT: 123.02 LBS | HEIGHT: 66 IN

## 2018-03-20 DIAGNOSIS — I10 ESSENTIAL (PRIMARY) HYPERTENSION: ICD-10-CM

## 2018-03-20 DIAGNOSIS — I80.9 PHLEBITIS AND THROMBOPHLEBITIS OF UNSPECIFIED SITE: ICD-10-CM

## 2018-03-20 DIAGNOSIS — I82.B12 ACUTE EMBOLISM AND THROMBOSIS OF LEFT SUBCLAVIAN VEIN: ICD-10-CM

## 2018-03-20 DIAGNOSIS — Z29.9 ENCOUNTER FOR PROPHYLACTIC MEASURES, UNSPECIFIED: ICD-10-CM

## 2018-03-20 DIAGNOSIS — I82.409 ACUTE EMBOLISM AND THROMBOSIS OF UNSPECIFIED DEEP VEINS OF UNSPECIFIED LOWER EXTREMITY: ICD-10-CM

## 2018-03-20 DIAGNOSIS — Z98.890 OTHER SPECIFIED POSTPROCEDURAL STATES: Chronic | ICD-10-CM

## 2018-03-20 LAB
ALBUMIN SERPL ELPH-MCNC: 2.4 G/DL — LOW (ref 3.5–5)
ALP SERPL-CCNC: 91 U/L — SIGNIFICANT CHANGE UP (ref 40–120)
ALT FLD-CCNC: 20 U/L DA — SIGNIFICANT CHANGE UP (ref 10–60)
ANION GAP SERPL CALC-SCNC: 8 MMOL/L — SIGNIFICANT CHANGE UP (ref 5–17)
APTT BLD: 32.1 SEC — SIGNIFICANT CHANGE UP (ref 27.5–37.4)
AST SERPL-CCNC: 26 U/L — SIGNIFICANT CHANGE UP (ref 10–40)
BASOPHILS # BLD AUTO: 0.1 K/UL — SIGNIFICANT CHANGE UP (ref 0–0.2)
BASOPHILS NFR BLD AUTO: 1.5 % — SIGNIFICANT CHANGE UP (ref 0–2)
BILIRUB SERPL-MCNC: 0.4 MG/DL — SIGNIFICANT CHANGE UP (ref 0.2–1.2)
BLD GP AB SCN SERPL QL: SIGNIFICANT CHANGE UP
BUN SERPL-MCNC: 16 MG/DL — SIGNIFICANT CHANGE UP (ref 7–18)
CALCIUM SERPL-MCNC: 8 MG/DL — LOW (ref 8.4–10.5)
CHLORIDE SERPL-SCNC: 110 MMOL/L — HIGH (ref 96–108)
CO2 SERPL-SCNC: 23 MMOL/L — SIGNIFICANT CHANGE UP (ref 22–31)
CREAT SERPL-MCNC: 0.7 MG/DL — SIGNIFICANT CHANGE UP (ref 0.5–1.3)
EOSINOPHIL # BLD AUTO: 0.1 K/UL — SIGNIFICANT CHANGE UP (ref 0–0.5)
EOSINOPHIL NFR BLD AUTO: 1.8 % — SIGNIFICANT CHANGE UP (ref 0–6)
GLUCOSE SERPL-MCNC: 89 MG/DL — SIGNIFICANT CHANGE UP (ref 70–99)
HCT VFR BLD CALC: 28.1 % — LOW (ref 34.5–45)
HGB BLD-MCNC: 8.8 G/DL — LOW (ref 11.5–15.5)
INR BLD: 1.28 RATIO — HIGH (ref 0.88–1.16)
LYMPHOCYTES # BLD AUTO: 1.8 K/UL — SIGNIFICANT CHANGE UP (ref 1–3.3)
LYMPHOCYTES # BLD AUTO: 24.5 % — SIGNIFICANT CHANGE UP (ref 13–44)
MCHC RBC-ENTMCNC: 28.1 PG — SIGNIFICANT CHANGE UP (ref 27–34)
MCHC RBC-ENTMCNC: 31.4 GM/DL — LOW (ref 32–36)
MCV RBC AUTO: 89.6 FL — SIGNIFICANT CHANGE UP (ref 80–100)
MONOCYTES # BLD AUTO: 0.7 K/UL — SIGNIFICANT CHANGE UP (ref 0–0.9)
MONOCYTES NFR BLD AUTO: 9.5 % — SIGNIFICANT CHANGE UP (ref 2–14)
NEUTROPHILS # BLD AUTO: 4.5 K/UL — SIGNIFICANT CHANGE UP (ref 1.8–7.4)
NEUTROPHILS NFR BLD AUTO: 62.6 % — SIGNIFICANT CHANGE UP (ref 43–77)
PLATELET # BLD AUTO: 287 K/UL — SIGNIFICANT CHANGE UP (ref 150–400)
POTASSIUM SERPL-MCNC: 3.8 MMOL/L — SIGNIFICANT CHANGE UP (ref 3.5–5.3)
POTASSIUM SERPL-SCNC: 3.8 MMOL/L — SIGNIFICANT CHANGE UP (ref 3.5–5.3)
PROT SERPL-MCNC: 6.5 G/DL — SIGNIFICANT CHANGE UP (ref 6–8.3)
PROTHROM AB SERPL-ACNC: 14 SEC — HIGH (ref 9.8–12.7)
RBC # BLD: 3.14 M/UL — LOW (ref 3.8–5.2)
RBC # FLD: 19.5 % — HIGH (ref 10.3–14.5)
SODIUM SERPL-SCNC: 141 MMOL/L — SIGNIFICANT CHANGE UP (ref 135–145)
WBC # BLD: 7.2 K/UL — SIGNIFICANT CHANGE UP (ref 3.8–10.5)
WBC # FLD AUTO: 7.2 K/UL — SIGNIFICANT CHANGE UP (ref 3.8–10.5)

## 2018-03-20 PROCEDURE — 93971 EXTREMITY STUDY: CPT | Mod: 26,LT

## 2018-03-20 PROCEDURE — 99024 POSTOP FOLLOW-UP VISIT: CPT

## 2018-03-20 PROCEDURE — 99285 EMERGENCY DEPT VISIT HI MDM: CPT

## 2018-03-20 RX ORDER — FAMOTIDINE 10 MG/ML
20 INJECTION INTRAVENOUS
Qty: 0 | Refills: 0 | Status: DISCONTINUED | OUTPATIENT
Start: 2018-03-20 | End: 2018-03-21

## 2018-03-20 RX ORDER — LISINOPRIL 2.5 MG/1
40 TABLET ORAL DAILY
Qty: 0 | Refills: 0 | Status: DISCONTINUED | OUTPATIENT
Start: 2018-03-20 | End: 2018-03-21

## 2018-03-20 RX ORDER — ENOXAPARIN SODIUM 100 MG/ML
60 INJECTION SUBCUTANEOUS ONCE
Qty: 0 | Refills: 0 | Status: COMPLETED | OUTPATIENT
Start: 2018-03-20 | End: 2018-03-20

## 2018-03-20 RX ORDER — AMLODIPINE BESYLATE 2.5 MG/1
10 TABLET ORAL DAILY
Qty: 0 | Refills: 0 | Status: DISCONTINUED | OUTPATIENT
Start: 2018-03-20 | End: 2018-03-21

## 2018-03-20 RX ORDER — ENOXAPARIN SODIUM 100 MG/ML
60 INJECTION SUBCUTANEOUS
Qty: 0 | Refills: 0 | Status: DISCONTINUED | OUTPATIENT
Start: 2018-03-21 | End: 2018-03-21

## 2018-03-20 RX ADMIN — ENOXAPARIN SODIUM 60 MILLIGRAM(S): 100 INJECTION SUBCUTANEOUS at 16:31

## 2018-03-20 NOTE — H&P ADULT - PROBLEM SELECTOR PLAN 2
Continue with home Lisinopril and Amlodipine dose  Monitor blood pressure and adjust medications if clinically indicated

## 2018-03-20 NOTE — ED PROVIDER NOTE - MEDICAL DECISION MAKING DETAILS
61 y/o F pt presents with L upper extremity DVT with anemia and recent surgery. Pt too high-risk to d/c home with anticoagulation. Will admit for evaluation. 63 y/o F pt presents with extensive L upper extremity DVT. Patient requires inpt treatment due to recent abdominal surgery and baseline severe anemia. Pt has a too high-risk to d/c home with anticoagulation. Will admit for anticoagulation and further management of DVT

## 2018-03-20 NOTE — H&P ADULT - RS GEN PE MLT RESP DETAILS PC
no wheezes/no rales/clear to auscultation bilaterally/breath sounds equal/no chest wall tenderness/no rhonchi

## 2018-03-20 NOTE — H&P ADULT - HISTORY OF PRESENT ILLNESS
62F from home, walks independently, PMHx HTN, recurrent SBO's, who presents to ED after being sent by Dr Guerrero to r/o left arm DVT. Patient was recently admitted to [facility] under surgery services, with diagnosis of complete SBO, s/p exploratory lap w/ adhesion lysis 2/13/18, which later got complicated by ileus. Patient initiated TPN via PICC line in [facility] on 2/16- 2/23. Patient was discharged off TPN and was told to follow up with Dr Guerrero office. Refers occasional cramps and left arm swelling that started a couple days ago. Denies SOB, CP, palpitations, abdominal pain, leg swelling/pain.    ED course:   Vitals were: BP: 123/81 mmHg, HR: 90 bpm, RR: 18 rpm, SaO2: 100% RA, T: 97.6F  Doppler US Left Upper extremity: Subclavian, axillary and brachial veins DVT. Left basilic vein superficial thrombophlebitis. 62F from home, walks independently, PMHx HTN, recurrent SBO's, who presents to ED after being sent by Dr Guerrero to r/o left arm DVT. Patient was recently admitted to [facility] under surgery services, with diagnosis of complete SBO, s/p exploratory lap w/ adhesion lysis 2/13/18, which later got complicated by ileus. Patient initiated TPN via PICC line in [facility] on 2/16- 2/23. Patient was discharged off TPN and was told to follow up with Dr Guerrero office. Refers occasional cramps and left arm swelling that started a couple days ago. Denies SOB, CP, palpitations, abdominal pain, leg swelling/pain.    ED course:   Vitals were: BP: 123/81 mmHg, HR: 90 bpm, RR: 18 rpm, SaO2: 100% RA, T: 97.6F  Doppler US Left Upper extremity: Subclavian, axillary and brachial veins DVT. Left basilic vein superficial thrombophlebitis.	  s/p Lovenox 60 mg SQ

## 2018-03-20 NOTE — ED PROVIDER NOTE - OBJECTIVE STATEMENT
61 y/o F pt with PMHx of HTN and SBO (recent; and lysis of adhesions) and PSHx of Intestinal Surgery sent by PMD to ED with L arm swelling noted today. Pt states having a PICC line placed in her L arm, which was subsequently removed on 02/23/2018. Pt was sent by Dr. Guerrero to ED to r/o DVT in L arm. Pt denies shortness of breath, chest pain, or any other complaints. NKDA. PMD: Dr. Guerrero. 63 y/o F pt with PMHx of HTN and SBO last month ( lysis of adhesions) and PSHx of Intestinal Surgery sent by PMD to ED with L arm swelling to rule out DVT.  Pt states having a PICC line placed in her L arm for TPN, which was subsequently removed on 02/23/2018. Pt was sent by Dr. Guerrero to ED to r/o DVT in L arm. Pt denies shortness of breath, chest pain, or any other complaints. NKDA. PMD: Dr. Guerrero.

## 2018-03-20 NOTE — H&P ADULT - PROBLEM SELECTOR PLAN 1
Provoked DVT s/p Exploratory Laparotomy 2/13/18 and TPN via PICC left arm 2/16  Doppler US LUE: Subclavian, axillary and brachial veins DVT. Left basilic vein superficial thrombophlebitis.  Continue Full dose Lovenox, will bridge to NOAC eventually Provoked DVT s/p Exploratory Laparotomy 2/13/18 and TPN via PICC left arm 2/16  Doppler US LUE: Subclavian, axillary and brachial veins DVT. Left basilic vein superficial thrombophlebitis.  Continue Full dose Lovenox q12hrs, will bridge to NOAC eventually

## 2018-03-21 ENCOUNTER — TRANSCRIPTION ENCOUNTER (OUTPATIENT)
Age: 63
End: 2018-03-21

## 2018-03-21 VITALS
HEART RATE: 83 BPM | DIASTOLIC BLOOD PRESSURE: 72 MMHG | RESPIRATION RATE: 18 BRPM | SYSTOLIC BLOOD PRESSURE: 110 MMHG | OXYGEN SATURATION: 100 % | TEMPERATURE: 98 F

## 2018-03-21 LAB
24R-OH-CALCIDIOL SERPL-MCNC: 23.9 NG/ML — LOW (ref 30–80)
ALBUMIN SERPL ELPH-MCNC: 2 G/DL — LOW (ref 3.5–5)
ALP SERPL-CCNC: 75 U/L — SIGNIFICANT CHANGE UP (ref 40–120)
ALT FLD-CCNC: 15 U/L DA — SIGNIFICANT CHANGE UP (ref 10–60)
ANION GAP SERPL CALC-SCNC: 8 MMOL/L — SIGNIFICANT CHANGE UP (ref 5–17)
AST SERPL-CCNC: 12 U/L — SIGNIFICANT CHANGE UP (ref 10–40)
BASOPHILS # BLD AUTO: 0.1 K/UL — SIGNIFICANT CHANGE UP (ref 0–0.2)
BASOPHILS # BLD AUTO: 0.1 K/UL — SIGNIFICANT CHANGE UP (ref 0–0.2)
BASOPHILS NFR BLD AUTO: 1.2 % — SIGNIFICANT CHANGE UP (ref 0–2)
BASOPHILS NFR BLD AUTO: 1.3 % — SIGNIFICANT CHANGE UP (ref 0–2)
BILIRUB SERPL-MCNC: 0.4 MG/DL — SIGNIFICANT CHANGE UP (ref 0.2–1.2)
BUN SERPL-MCNC: 16 MG/DL — SIGNIFICANT CHANGE UP (ref 7–18)
CALCIUM SERPL-MCNC: 7.6 MG/DL — LOW (ref 8.4–10.5)
CHLORIDE SERPL-SCNC: 108 MMOL/L — SIGNIFICANT CHANGE UP (ref 96–108)
CHOLEST SERPL-MCNC: <50 MG/DL — SIGNIFICANT CHANGE UP (ref 10–199)
CO2 SERPL-SCNC: 25 MMOL/L — SIGNIFICANT CHANGE UP (ref 22–31)
CREAT SERPL-MCNC: 0.7 MG/DL — SIGNIFICANT CHANGE UP (ref 0.5–1.3)
EOSINOPHIL # BLD AUTO: 0.2 K/UL — SIGNIFICANT CHANGE UP (ref 0–0.5)
EOSINOPHIL # BLD AUTO: 0.2 K/UL — SIGNIFICANT CHANGE UP (ref 0–0.5)
EOSINOPHIL NFR BLD AUTO: 2.5 % — SIGNIFICANT CHANGE UP (ref 0–6)
EOSINOPHIL NFR BLD AUTO: 2.8 % — SIGNIFICANT CHANGE UP (ref 0–6)
FOLATE SERPL-MCNC: 14.8 NG/ML — SIGNIFICANT CHANGE UP (ref 4.8–24.2)
GLUCOSE SERPL-MCNC: 71 MG/DL — SIGNIFICANT CHANGE UP (ref 70–99)
HCT VFR BLD CALC: 26.3 % — LOW (ref 34.5–45)
HCT VFR BLD CALC: 27.9 % — LOW (ref 34.5–45)
HDLC SERPL-MCNC: 26 MG/DL — LOW (ref 40–125)
HGB BLD-MCNC: 7.8 G/DL — LOW (ref 11.5–15.5)
HGB BLD-MCNC: 8.3 G/DL — LOW (ref 11.5–15.5)
LIPID PNL WITH DIRECT LDL SERPL: <18 MG/DL — SIGNIFICANT CHANGE UP
LYMPHOCYTES # BLD AUTO: 1.7 K/UL — SIGNIFICANT CHANGE UP (ref 1–3.3)
LYMPHOCYTES # BLD AUTO: 2 K/UL — SIGNIFICANT CHANGE UP (ref 1–3.3)
LYMPHOCYTES # BLD AUTO: 28.9 % — SIGNIFICANT CHANGE UP (ref 13–44)
LYMPHOCYTES # BLD AUTO: 32.9 % — SIGNIFICANT CHANGE UP (ref 13–44)
MAGNESIUM SERPL-MCNC: 1.7 MG/DL — SIGNIFICANT CHANGE UP (ref 1.6–2.6)
MCHC RBC-ENTMCNC: 27 PG — SIGNIFICANT CHANGE UP (ref 27–34)
MCHC RBC-ENTMCNC: 27.2 PG — SIGNIFICANT CHANGE UP (ref 27–34)
MCHC RBC-ENTMCNC: 29.6 GM/DL — LOW (ref 32–36)
MCHC RBC-ENTMCNC: 29.6 GM/DL — LOW (ref 32–36)
MCV RBC AUTO: 91.1 FL — SIGNIFICANT CHANGE UP (ref 80–100)
MCV RBC AUTO: 91.8 FL — SIGNIFICANT CHANGE UP (ref 80–100)
MONOCYTES # BLD AUTO: 0.5 K/UL — SIGNIFICANT CHANGE UP (ref 0–0.9)
MONOCYTES # BLD AUTO: 0.6 K/UL — SIGNIFICANT CHANGE UP (ref 0–0.9)
MONOCYTES NFR BLD AUTO: 10.5 % — SIGNIFICANT CHANGE UP (ref 2–14)
MONOCYTES NFR BLD AUTO: 8.4 % — SIGNIFICANT CHANGE UP (ref 2–14)
NEUTROPHILS # BLD AUTO: 3.2 K/UL — SIGNIFICANT CHANGE UP (ref 1.8–7.4)
NEUTROPHILS # BLD AUTO: 3.4 K/UL — SIGNIFICANT CHANGE UP (ref 1.8–7.4)
NEUTROPHILS NFR BLD AUTO: 52.9 % — SIGNIFICANT CHANGE UP (ref 43–77)
NEUTROPHILS NFR BLD AUTO: 58.5 % — SIGNIFICANT CHANGE UP (ref 43–77)
PHOSPHATE SERPL-MCNC: 2.8 MG/DL — SIGNIFICANT CHANGE UP (ref 2.5–4.5)
PLATELET # BLD AUTO: 269 K/UL — SIGNIFICANT CHANGE UP (ref 150–400)
PLATELET # BLD AUTO: 323 K/UL — SIGNIFICANT CHANGE UP (ref 150–400)
POTASSIUM SERPL-MCNC: 3.4 MMOL/L — LOW (ref 3.5–5.3)
POTASSIUM SERPL-SCNC: 3.4 MMOL/L — LOW (ref 3.5–5.3)
PROT SERPL-MCNC: 5.5 G/DL — LOW (ref 6–8.3)
RBC # BLD: 2.89 M/UL — LOW (ref 3.8–5.2)
RBC # BLD: 3.04 M/UL — LOW (ref 3.8–5.2)
RBC # FLD: 18.7 % — HIGH (ref 10.3–14.5)
RBC # FLD: 19.4 % — HIGH (ref 10.3–14.5)
SODIUM SERPL-SCNC: 141 MMOL/L — SIGNIFICANT CHANGE UP (ref 135–145)
TOTAL CHOLESTEROL/HDL RATIO MEASUREMENT: <1.9 RATIO — LOW (ref 3.3–7.1)
TRIGL SERPL-MCNC: 30 MG/DL — SIGNIFICANT CHANGE UP (ref 10–149)
VIT B12 SERPL-MCNC: 416 PG/ML — SIGNIFICANT CHANGE UP (ref 232–1245)
WBC # BLD: 5.8 K/UL — SIGNIFICANT CHANGE UP (ref 3.8–10.5)
WBC # BLD: 6.1 K/UL — SIGNIFICANT CHANGE UP (ref 3.8–10.5)
WBC # FLD AUTO: 5.8 K/UL — SIGNIFICANT CHANGE UP (ref 3.8–10.5)
WBC # FLD AUTO: 6.1 K/UL — SIGNIFICANT CHANGE UP (ref 3.8–10.5)

## 2018-03-21 RX ORDER — APIXABAN 2.5 MG/1
1 TABLET, FILM COATED ORAL
Qty: 60 | Refills: 0 | OUTPATIENT
Start: 2018-03-21 | End: 2018-04-19

## 2018-03-21 RX ADMIN — LISINOPRIL 40 MILLIGRAM(S): 2.5 TABLET ORAL at 06:39

## 2018-03-21 RX ADMIN — AMLODIPINE BESYLATE 10 MILLIGRAM(S): 2.5 TABLET ORAL at 06:39

## 2018-03-21 RX ADMIN — ENOXAPARIN SODIUM 60 MILLIGRAM(S): 100 INJECTION SUBCUTANEOUS at 06:39

## 2018-03-21 RX ADMIN — FAMOTIDINE 20 MILLIGRAM(S): 10 INJECTION INTRAVENOUS at 06:39

## 2018-03-21 NOTE — DISCHARGE NOTE ADULT - MEDICATION SUMMARY - MEDICATIONS TO TAKE
I will START or STAY ON the medications listed below when I get home from the hospital:    lisinopril 40 mg oral tablet  -- 1 tab(s) by mouth once a day  -- Indication: For HTN (hypertension)    Eliquis 5 mg oral tablet  -- DVT TREATMENT DOSING  FOR FIRST 7 DAYS, TAKE 10MG TWICE DAILY   THEN START 5MG TWICE DAILY.      -- Check with your doctor before becoming pregnant.  It is very important that you take or use this exactly as directed.  Do not skip doses or discontinue unless directed by your doctor.  Obtain medical advice before taking any non-prescription drugs as some may affect the action of this medication.    -- Indication: For DVT (deep venous thrombosis)    amLODIPine 10 mg oral tablet  -- 1 tab(s) by mouth once a day  -- Indication: For HTN (hypertension)    famotidine 40 mg oral tablet  -- 1 tab(s) by mouth once a day (at bedtime)  -- Indication: For GI protective

## 2018-03-21 NOTE — DISCHARGE NOTE ADULT - CARE PROVIDER_API CALL
Dolores Alvares ([facility]), Medicine  [address] [place] [address]  Phone: [phone]  Fax: [phone]    Solomon Guerrero), Surgery  [address] [place] [address]  Phone: [phone]  Fax: [phone]

## 2018-03-21 NOTE — DISCHARGE NOTE ADULT - PLAN OF CARE
avoid future episodes or worsening of DVT You were seen and managed for your blood clot in your left arm. Ultrasound showed Left upper extremity DVT in the subclavian, axillary and brachial veins. Left basilic vein superficial thrombophlebitis.  You were started on anticoagulation with subcutaneous lovenox medication.  YOU WILL BE DISCHARGED ON ORAL MEDICATION ELIQUIS 10MG TWICE DAILY FOR 7 DAYS, THEN 5MG TWICE DAILY FOR AT LEAST NEXT 6 MONTHS.  FOLLOW UP WITH PCP IN 5 DAYS FOR EVAL. BP<130/80 Stable. Continue with prescribed meds.

## 2018-03-21 NOTE — DISCHARGE NOTE ADULT - CARE PLAN
Principal Discharge DX:	DVT (deep venous thrombosis)  Goal:	avoid future episodes or worsening of DVT  Assessment and plan of treatment:	You were seen and managed for your blood clot in your left arm. Ultrasound showed Left upper extremity DVT in the subclavian, axillary and brachial veins. Left basilic vein superficial thrombophlebitis.  You were started on anticoagulation with subcutaneous lovenox medication.  YOU WILL BE DISCHARGED ON ORAL MEDICATION ELIQUIS 10MG TWICE DAILY FOR 7 DAYS, THEN 5MG TWICE DAILY FOR AT LEAST NEXT 6 MONTHS.  FOLLOW UP WITH PCP IN 5 DAYS FOR EVAL.  Secondary Diagnosis:	HTN (hypertension)  Goal:	BP<130/80  Assessment and plan of treatment:	Stable. Continue with prescribed meds.

## 2018-03-21 NOTE — DISCHARGE NOTE ADULT - HOSPITAL COURSE
62F from home, walks independently, PMHx HTN, recurrent SBO's, who presents to ED after being sent by Dr Guerrero to r/o left arm DVT. Patient was recently admitted to [facility] under surgery services, with diagnosis of complete SBO, s/p exploratory lap w/ adhesion lysis 2/13/18, which later got complicated by ileus. Patient initiated TPN via PICC line in [facility] on 2/16- 2/23. Patient was discharged off TPN and was told to follow up with Dr Guerrero office. Refers occasional cramps and left arm swelling that started a couple days ago. Denies SOB, CP, palpitations, abdominal pain, leg swelling/pain.    ED course:   Vitals were: BP: 123/81 mmHg, HR: 90 bpm, RR: 18 rpm, SaO2: 100% RA, T: 97.6F  Doppler US Left Upper extremity: Subclavian, axillary and brachial veins DVT. Left basilic vein superficial thrombophlebitis.	  s/p Lovenox 60 mg SQ    DVT - Pt was seen and managed for blood clot in left arm. Ultrasound showed Left upper extremity DVT in the subclavian, axillary and brachial veins. Left basilic vein superficial thrombophlebitis.  Pt was started on anticoagulation with subcutaneous lovenox medication.  PT WILL BE DISCHARGED ON ORAL MEDICATION ELIQUIS 10MG TWICE DAILY FOR 7 DAYS, THEN 5MG TWICE DAILY FOR AT LEAST NEXT 6 MONTHS.  FOLLOW UP WITH PCP IN 5 DAYS FOR EVAL.  HTN - medically managed and dose adjusted as clinically indicated.    Pt is now medically stable for discharge home, start eliquis and continue prescribed meds and follow up with PCP in 5 days for eval. 62F from home, walks independently, PMHx HTN, recurrent SBO's, who presents to ED after being sent by Dr Guerrero to r/o left arm DVT. Patient was recently admitted to [facility] under surgery services, with diagnosis of complete SBO, s/p exploratory lap w/ adhesion lysis 2/13/18, which later got complicated by ileus. Patient initiated TPN via PICC line in [facility] on 2/16- 2/23. Patient was discharged off TPN and was told to follow up with Dr Guerrero office. Refers occasional cramps and left arm swelling that started a couple days ago. Denies SOB, CP, palpitations, abdominal pain, leg swelling/pain.    ED course:   Vitals were: BP: 123/81 mmHg, HR: 90 bpm, RR: 18 rpm, SaO2: 100% RA, T: 97.6F  Doppler US Left Upper extremity: Subclavian, axillary and brachial veins DVT. Left basilic vein superficial thrombophlebitis.	  s/p Lovenox 60 mg SQ    DVT - Pt was seen and managed for blood clot in left arm 2nd to picc line. Ultrasound showed Left upper extremity DVT in the subclavian, axillary and brachial veins. Left basilic vein superficial thrombophlebitis.  Pt was started on anticoagulation with subcutaneous lovenox medication.  PT WILL BE DISCHARGED ON ORAL MEDICATION ELIQUIS 10MG TWICE DAILY FOR 7 DAYS, THEN 5MG TWICE DAILY FOR AT LEAST NEXT 6 MONTHS.  FOLLOW UP WITH PCP IN 5 DAYS FOR EVAL.  HTN - medically managed and dose adjusted as clinically indicated.    Pt is now medically stable for discharge home, start eliquis and continue prescribed meds and follow up with PCP in 5 days for eval.

## 2018-03-21 NOTE — PROGRESS NOTE ADULT - SUBJECTIVE AND OBJECTIVE BOX
62F from home, walks independently, PMHx HTN, recurrent SBO's, who presents to ED after being sent by Dr Guerrero to r/o left arm DVT. Patient was recently admitted to [facility] under surgery services, with diagnosis of complete SBO, s/p exploratory lap w/ adhesion lysis 2/13/18, which later got complicated by ileus. Patient initiated TPN via PICC line in [facility] on 2/16- 2/23. Patient was discharged off TPN and was told to follow up with Dr Guerrero office. Refers occasional cramps and left arm swelling that started a couple days ago. Denies SOB, CP, palpitations, abdominal pain, leg swelling/pain.    ED course:   Vitals were: BP: 123/81 mmHg, HR: 90 bpm, RR: 18 rpm, SaO2: 100% RA, T: 97.6F  Doppler US Left Upper extremity: Subclavian, axillary and brachial veins DVT. Left basilic vein superficial thrombophlebitis.	  s/p Lovenox 60 mg SQ    pt seen in icu [  ], reg med floor [  x ], bed [x  ], chair at bedside [   ], a+o x3 [x  ], lethargic [  ],  nad [x  ]      Allergies    No Known Allergies        Vitals    T(F): 98.2 (03-21-18 @ 05:32), Max: 98.6 (03-20-18 @ 21:28)  HR: 70 (03-21-18 @ 05:32) (70 - 79)  BP: 118/72 (03-21-18 @ 05:32) (118/72 - 130/82)  RR: 18 (03-21-18 @ 05:32) (16 - 18)  SpO2: 97% (03-21-18 @ 05:32) (97% - 100%)  Wt(kg): --  CAPILLARY BLOOD GLUCOSE          Labs                          8.3    5.8   )-----------( 323      ( 21 Mar 2018 12:05 )             27.9       03-21    141  |  108  |  16  ----------------------------<  71  3.4<L>   |  25  |  0.70    Ca    7.6<L>      21 Mar 2018 06:35  Phos  2.8     03-21  Mg     1.7     03-21    TPro  5.5<L>  /  Alb  2.0<L>  /  TBili  0.4  /  DBili  x   /  AST  12  /  ALT  15  /  AlkPhos  75  03-21        Radiology Results    < from: US Duplex Venous Upper Ext Ltd, Left (03.20.18 @ 15:12) >  IMPRESSION:  Left upper extremity DVT in the subclavian, axillary and brachial veins.   Left basilic vein superficial thrombophlebitis.    < end of copied text >        Meds    MEDICATIONS  (STANDING):  amLODIPine   Tablet 10 milliGRAM(s) Oral daily  enoxaparin Injectable 60 milliGRAM(s) SubCutaneous two times a day  famotidine    Tablet 20 milliGRAM(s) Oral two times a day  lisinopril 40 milliGRAM(s) Oral daily      MEDICATIONS  (PRN):      Physical Exam    Neuro :  no focal deficits  Respiratory: CTA B/L  CV: RRR, S1S2, no murmurs,   Abdominal: Soft, NT, ND +BS,  Extremities: left upper arm mild swelling and mild tender to deep palp + peripheral pulses    ASSESSMENT    provoked Acute embolism and thrombosis of deep vein of left upper extremity  h/o SBO (small bowel obstruction)  HTN (hypertension)  History of intestinal surgery      PLAN    upper extrem doppler positive for r ue dvt noted above  pt started on levaquin  anti coag to be changed to eliquis to complete 6 mths tx on d/c  cont current meds  pt stable for d/c home

## 2018-03-21 NOTE — DISCHARGE NOTE ADULT - PATIENT PORTAL LINK FT
You can access the [facility] Patient Portal, offered by [facility], by registering with the following website: [email or web address]

## 2018-04-03 ENCOUNTER — APPOINTMENT (OUTPATIENT)
Dept: SURGERY | Facility: CLINIC | Age: 63
End: 2018-04-03
Payer: MEDICAID

## 2018-04-03 PROCEDURE — 99024 POSTOP FOLLOW-UP VISIT: CPT

## 2018-04-08 ENCOUNTER — EMERGENCY (EMERGENCY)
Facility: HOSPITAL | Age: 63
LOS: 1 days | Discharge: HOME | End: 2018-04-08
Attending: EMERGENCY MEDICINE
Payer: MEDICAID

## 2018-04-08 VITALS
TEMPERATURE: 99 F | OXYGEN SATURATION: 100 % | HEART RATE: 94 BPM | SYSTOLIC BLOOD PRESSURE: 148 MMHG | DIASTOLIC BLOOD PRESSURE: 87 MMHG | RESPIRATION RATE: 18 BRPM

## 2018-04-08 DIAGNOSIS — Z98.890 OTHER SPECIFIED POSTPROCEDURAL STATES: Chronic | ICD-10-CM

## 2018-04-08 LAB
ALBUMIN SERPL ELPH-MCNC: 2.5 G/DL — LOW (ref 3.5–5)
ALP SERPL-CCNC: 72 U/L — SIGNIFICANT CHANGE UP (ref 40–120)
ALT FLD-CCNC: 30 U/L DA — SIGNIFICANT CHANGE UP (ref 10–60)
ANION GAP SERPL CALC-SCNC: 7 MMOL/L — SIGNIFICANT CHANGE UP (ref 5–17)
APTT BLD: 29.7 SEC — SIGNIFICANT CHANGE UP (ref 27.5–37.4)
AST SERPL-CCNC: 22 U/L — SIGNIFICANT CHANGE UP (ref 10–40)
BASOPHILS # BLD AUTO: 0.1 K/UL — SIGNIFICANT CHANGE UP (ref 0–0.2)
BASOPHILS NFR BLD AUTO: 1.8 % — SIGNIFICANT CHANGE UP (ref 0–2)
BILIRUB SERPL-MCNC: 0.3 MG/DL — SIGNIFICANT CHANGE UP (ref 0.2–1.2)
BUN SERPL-MCNC: 18 MG/DL — SIGNIFICANT CHANGE UP (ref 7–18)
CALCIUM SERPL-MCNC: 7.8 MG/DL — LOW (ref 8.4–10.5)
CHLORIDE SERPL-SCNC: 107 MMOL/L — SIGNIFICANT CHANGE UP (ref 96–108)
CO2 SERPL-SCNC: 26 MMOL/L — SIGNIFICANT CHANGE UP (ref 22–31)
CREAT SERPL-MCNC: 0.78 MG/DL — SIGNIFICANT CHANGE UP (ref 0.5–1.3)
EOSINOPHIL # BLD AUTO: 0 K/UL — SIGNIFICANT CHANGE UP (ref 0–0.5)
EOSINOPHIL NFR BLD AUTO: 0.6 % — SIGNIFICANT CHANGE UP (ref 0–6)
GLUCOSE SERPL-MCNC: 82 MG/DL — SIGNIFICANT CHANGE UP (ref 70–99)
HCT VFR BLD CALC: 25.5 % — LOW (ref 34.5–45)
HGB BLD-MCNC: 7.9 G/DL — LOW (ref 11.5–15.5)
INR BLD: 1.26 RATIO — HIGH (ref 0.88–1.16)
LYMPHOCYTES # BLD AUTO: 2 K/UL — SIGNIFICANT CHANGE UP (ref 1–3.3)
LYMPHOCYTES # BLD AUTO: 32.1 % — SIGNIFICANT CHANGE UP (ref 13–44)
MCHC RBC-ENTMCNC: 29 PG — SIGNIFICANT CHANGE UP (ref 27–34)
MCHC RBC-ENTMCNC: 30.9 GM/DL — LOW (ref 32–36)
MCV RBC AUTO: 93.9 FL — SIGNIFICANT CHANGE UP (ref 80–100)
MONOCYTES # BLD AUTO: 0.5 K/UL — SIGNIFICANT CHANGE UP (ref 0–0.9)
MONOCYTES NFR BLD AUTO: 7.2 % — SIGNIFICANT CHANGE UP (ref 2–14)
NEUTROPHILS # BLD AUTO: 3.7 K/UL — SIGNIFICANT CHANGE UP (ref 1.8–7.4)
NEUTROPHILS NFR BLD AUTO: 58.3 % — SIGNIFICANT CHANGE UP (ref 43–77)
NT-PROBNP SERPL-SCNC: 143 PG/ML — HIGH (ref 0–125)
PLATELET # BLD AUTO: 434 K/UL — HIGH (ref 150–400)
POTASSIUM SERPL-MCNC: 3.9 MMOL/L — SIGNIFICANT CHANGE UP (ref 3.5–5.3)
POTASSIUM SERPL-SCNC: 3.9 MMOL/L — SIGNIFICANT CHANGE UP (ref 3.5–5.3)
PROT SERPL-MCNC: 6.3 G/DL — SIGNIFICANT CHANGE UP (ref 6–8.3)
PROTHROM AB SERPL-ACNC: 13.8 SEC — HIGH (ref 9.8–12.7)
RBC # BLD: 2.72 M/UL — LOW (ref 3.8–5.2)
RBC # FLD: 18.2 % — HIGH (ref 10.3–14.5)
SODIUM SERPL-SCNC: 140 MMOL/L — SIGNIFICANT CHANGE UP (ref 135–145)
WBC # BLD: 6.3 K/UL — SIGNIFICANT CHANGE UP (ref 3.8–10.5)
WBC # FLD AUTO: 6.3 K/UL — SIGNIFICANT CHANGE UP (ref 3.8–10.5)

## 2018-04-08 PROCEDURE — 93970 EXTREMITY STUDY: CPT

## 2018-04-08 PROCEDURE — 99284 EMERGENCY DEPT VISIT MOD MDM: CPT | Mod: 25

## 2018-04-08 PROCEDURE — 93970 EXTREMITY STUDY: CPT | Mod: 26

## 2018-04-08 PROCEDURE — 80053 COMPREHEN METABOLIC PANEL: CPT

## 2018-04-08 PROCEDURE — 86850 RBC ANTIBODY SCREEN: CPT

## 2018-04-08 PROCEDURE — 83880 ASSAY OF NATRIURETIC PEPTIDE: CPT

## 2018-04-08 PROCEDURE — 99285 EMERGENCY DEPT VISIT HI MDM: CPT | Mod: 25

## 2018-04-08 PROCEDURE — 85610 PROTHROMBIN TIME: CPT

## 2018-04-08 PROCEDURE — 86900 BLOOD TYPING SEROLOGIC ABO: CPT

## 2018-04-08 PROCEDURE — 85027 COMPLETE CBC AUTOMATED: CPT

## 2018-04-08 PROCEDURE — 85730 THROMBOPLASTIN TIME PARTIAL: CPT

## 2018-04-08 PROCEDURE — 86901 BLOOD TYPING SEROLOGIC RH(D): CPT

## 2018-04-08 PROCEDURE — 93005 ELECTROCARDIOGRAM TRACING: CPT

## 2018-04-08 NOTE — ED PROVIDER NOTE - MEDICAL DECISION MAKING DETAILS
61 y/o F pt presents with concern for abnormal labs and 2 weeks of bilateral leg swelling without chest pain or SOB. Will obtain labs, doppler of lower extremities and reassess.

## 2018-04-08 NOTE — ED PROVIDER NOTE - PROGRESS NOTE DETAILS
Spoke to Dr. Alvares. Reports labs from 2 days ago showed hemoglobin of 7.6. LE dopplers neg for DVT. Discussed above with patient. Rectal exam shows light brown mucus, no gross blood evident. Patient stable for discharge to f/u with Dr. Alvares/Vandana as outpatient.

## 2018-04-08 NOTE — ED PROVIDER NOTE - PMH
Alert and oriented, no focal deficits, no motor or sensory deficits.
DVT (deep venous thrombosis)    HTN (hypertension)    SBO (small bowel obstruction)

## 2018-04-08 NOTE — ED PROVIDER NOTE - OBJECTIVE STATEMENT
61 y/o F pt with a significant PMHx of HTN, SBO and recently diagnosed LUE DVT (on Eliquis) and a significant PSHx of intestinal surgery sent to the ED by pmd Dr. Ross for abnormal lab result. Pt states Dr. Ross called earlier to say she has an abnormally "low level"; unclear what lab result is low. On exam, pt reports only new symptom is that she developed bilateral leg swelling x2 days ago. Dr. Ross gave pt requisition to get bilateral lower extremity US, which pt has not yet done. Pt claims she has been taking her Eliquis daily. Pt denies rectal bleeding, SOB, chest pain or any other complaints. NKDA. LBM: this morning; light brown stool. 61 y/o F pt with a significant PMHx of HTN, SBO and recently diagnosed LUE DVT (on Eliquis) and a significant PSHx of intestinal surgery sent to the ED by pmd Dr. Ross for abnormal lab result. Pt states Dr. Ross called earlier to say she has an abnormally "low level"; unclear what lab result is low. On exam, pt reports only new symptom is that she developed bilateral leg swelling x2 weeks ago. Dr. Ross gave pt requisition to get bilateral lower extremity US, which pt has not yet done. Pt claims she has been taking her Eliquis daily. Pt denies rectal bleeding, SOB, chest pain or any other complaints. NKDA. LBM: this morning; light brown stool.

## 2018-04-08 NOTE — ED PROVIDER NOTE - MUSCULOSKELETAL, MLM
Spine appears normal, range of motion is not limited, no muscle or joint tenderness. Lower extremities with 2+ pitting edema.

## 2018-04-09 VITALS
RESPIRATION RATE: 18 BRPM | OXYGEN SATURATION: 100 % | TEMPERATURE: 98 F | HEART RATE: 79 BPM | SYSTOLIC BLOOD PRESSURE: 120 MMHG | DIASTOLIC BLOOD PRESSURE: 76 MMHG

## 2018-04-10 ENCOUNTER — INPATIENT (INPATIENT)
Facility: HOSPITAL | Age: 63
LOS: 2 days | Discharge: HOME | DRG: 378 | End: 2018-04-13
Attending: INTERNAL MEDICINE | Admitting: INTERNAL MEDICINE
Payer: MEDICAID

## 2018-04-10 VITALS
HEART RATE: 104 BPM | DIASTOLIC BLOOD PRESSURE: 65 MMHG | OXYGEN SATURATION: 100 % | SYSTOLIC BLOOD PRESSURE: 102 MMHG | TEMPERATURE: 97 F | RESPIRATION RATE: 18 BRPM

## 2018-04-10 DIAGNOSIS — K92.2 GASTROINTESTINAL HEMORRHAGE, UNSPECIFIED: ICD-10-CM

## 2018-04-10 DIAGNOSIS — M79.89 OTHER SPECIFIED SOFT TISSUE DISORDERS: ICD-10-CM

## 2018-04-10 DIAGNOSIS — Z98.890 OTHER SPECIFIED POSTPROCEDURAL STATES: Chronic | ICD-10-CM

## 2018-04-10 DIAGNOSIS — I82.409 ACUTE EMBOLISM AND THROMBOSIS OF UNSPECIFIED DEEP VEINS OF UNSPECIFIED LOWER EXTREMITY: ICD-10-CM

## 2018-04-10 DIAGNOSIS — Z29.9 ENCOUNTER FOR PROPHYLACTIC MEASURES, UNSPECIFIED: ICD-10-CM

## 2018-04-10 DIAGNOSIS — D64.9 ANEMIA, UNSPECIFIED: ICD-10-CM

## 2018-04-10 DIAGNOSIS — I10 ESSENTIAL (PRIMARY) HYPERTENSION: ICD-10-CM

## 2018-04-10 PROBLEM — I82.622 DEEP VEIN THROMBOSIS (DVT) OF LEFT UPPER EXTREMITY: Status: RESOLVED | Noted: 2018-03-20 | Resolved: 2018-04-10

## 2018-04-10 LAB
ACETONE SERPL-MCNC: NEGATIVE — SIGNIFICANT CHANGE UP
ALBUMIN SERPL ELPH-MCNC: 2.2 G/DL — LOW (ref 3.5–5)
ALP SERPL-CCNC: 63 U/L — SIGNIFICANT CHANGE UP (ref 40–120)
ALT FLD-CCNC: 27 U/L DA — SIGNIFICANT CHANGE UP (ref 10–60)
ANION GAP SERPL CALC-SCNC: 4 MMOL/L — LOW (ref 5–17)
APTT BLD: 34.8 SEC — SIGNIFICANT CHANGE UP (ref 27.5–37.4)
AST SERPL-CCNC: 24 U/L — SIGNIFICANT CHANGE UP (ref 10–40)
BASOPHILS # BLD AUTO: 0.1 K/UL — SIGNIFICANT CHANGE UP (ref 0–0.2)
BASOPHILS NFR BLD AUTO: 1.8 % — SIGNIFICANT CHANGE UP (ref 0–2)
BILIRUB SERPL-MCNC: 0.2 MG/DL — SIGNIFICANT CHANGE UP (ref 0.2–1.2)
BLD GP AB SCN SERPL QL: SIGNIFICANT CHANGE UP
BUN SERPL-MCNC: 15 MG/DL — SIGNIFICANT CHANGE UP (ref 7–18)
CALCIUM SERPL-MCNC: 7.8 MG/DL — LOW (ref 8.4–10.5)
CHLORIDE SERPL-SCNC: 108 MMOL/L — SIGNIFICANT CHANGE UP (ref 96–108)
CK MB BLD-MCNC: <1.3 % — SIGNIFICANT CHANGE UP (ref 0–3.5)
CK MB CFR SERPL CALC: <1 NG/ML — SIGNIFICANT CHANGE UP (ref 0–3.6)
CK SERPL-CCNC: 77 U/L — SIGNIFICANT CHANGE UP (ref 21–215)
CO2 SERPL-SCNC: 30 MMOL/L — SIGNIFICANT CHANGE UP (ref 22–31)
CREAT SERPL-MCNC: 0.89 MG/DL — SIGNIFICANT CHANGE UP (ref 0.5–1.3)
EOSINOPHIL # BLD AUTO: 0 K/UL — SIGNIFICANT CHANGE UP (ref 0–0.5)
EOSINOPHIL NFR BLD AUTO: 0.2 % — SIGNIFICANT CHANGE UP (ref 0–6)
GLUCOSE SERPL-MCNC: 83 MG/DL — SIGNIFICANT CHANGE UP (ref 70–99)
HCT VFR BLD CALC: 23.8 % — LOW (ref 34.5–45)
HGB BLD-MCNC: 7.4 G/DL — LOW (ref 11.5–15.5)
INR BLD: 1.2 RATIO — HIGH (ref 0.88–1.16)
IRON SATN MFR SERPL: 16 % — SIGNIFICANT CHANGE UP (ref 15–50)
IRON SATN MFR SERPL: 43 UG/DL — SIGNIFICANT CHANGE UP (ref 40–150)
LDH SERPL L TO P-CCNC: 194 U/L — SIGNIFICANT CHANGE UP (ref 120–225)
LYMPHOCYTES # BLD AUTO: 2.4 K/UL — SIGNIFICANT CHANGE UP (ref 1–3.3)
LYMPHOCYTES # BLD AUTO: 37.1 % — SIGNIFICANT CHANGE UP (ref 13–44)
MAGNESIUM SERPL-MCNC: 1.8 MG/DL — SIGNIFICANT CHANGE UP (ref 1.6–2.6)
MCHC RBC-ENTMCNC: 29.2 PG — SIGNIFICANT CHANGE UP (ref 27–34)
MCHC RBC-ENTMCNC: 31.3 GM/DL — LOW (ref 32–36)
MCV RBC AUTO: 93.2 FL — SIGNIFICANT CHANGE UP (ref 80–100)
MONOCYTES # BLD AUTO: 0.3 K/UL — SIGNIFICANT CHANGE UP (ref 0–0.9)
MONOCYTES NFR BLD AUTO: 4.9 % — SIGNIFICANT CHANGE UP (ref 2–14)
NEUTROPHILS # BLD AUTO: 3.6 K/UL — SIGNIFICANT CHANGE UP (ref 1.8–7.4)
NEUTROPHILS NFR BLD AUTO: 56 % — SIGNIFICANT CHANGE UP (ref 43–77)
NT-PROBNP SERPL-SCNC: 214 PG/ML — HIGH (ref 0–125)
OB PNL STL: POSITIVE
PLATELET # BLD AUTO: 408 K/UL — HIGH (ref 150–400)
POTASSIUM SERPL-MCNC: 3.8 MMOL/L — SIGNIFICANT CHANGE UP (ref 3.5–5.3)
POTASSIUM SERPL-SCNC: 3.8 MMOL/L — SIGNIFICANT CHANGE UP (ref 3.5–5.3)
PROT SERPL-MCNC: 5.5 G/DL — LOW (ref 6–8.3)
PROTHROM AB SERPL-ACNC: 13.1 SEC — HIGH (ref 9.8–12.7)
RBC # BLD: 2.55 M/UL — LOW (ref 3.8–5.2)
RBC # FLD: 17.6 % — HIGH (ref 10.3–14.5)
SODIUM SERPL-SCNC: 142 MMOL/L — SIGNIFICANT CHANGE UP (ref 135–145)
TIBC SERPL-MCNC: 271 UG/DL — SIGNIFICANT CHANGE UP (ref 250–450)
TROPONIN I SERPL-MCNC: <0.015 NG/ML — SIGNIFICANT CHANGE UP (ref 0–0.04)
UIBC SERPL-MCNC: 228 UG/DL — SIGNIFICANT CHANGE UP (ref 110–370)
WBC # BLD: 6.4 K/UL — SIGNIFICANT CHANGE UP (ref 3.8–10.5)
WBC # FLD AUTO: 6.4 K/UL — SIGNIFICANT CHANGE UP (ref 3.8–10.5)

## 2018-04-10 PROCEDURE — 71045 X-RAY EXAM CHEST 1 VIEW: CPT | Mod: 26

## 2018-04-10 PROCEDURE — 99285 EMERGENCY DEPT VISIT HI MDM: CPT

## 2018-04-10 PROCEDURE — 93971 EXTREMITY STUDY: CPT | Mod: 26,LT

## 2018-04-10 RX ORDER — FERROUS SULFATE 325(65) MG
325 TABLET ORAL DAILY
Qty: 0 | Refills: 0 | Status: DISCONTINUED | OUTPATIENT
Start: 2018-04-10 | End: 2018-04-12

## 2018-04-10 RX ORDER — AMLODIPINE BESYLATE 2.5 MG/1
1 TABLET ORAL
Qty: 0 | Refills: 0 | COMMUNITY

## 2018-04-10 RX ORDER — FUROSEMIDE 40 MG
20 TABLET ORAL ONCE
Qty: 0 | Refills: 0 | Status: COMPLETED | OUTPATIENT
Start: 2018-04-10 | End: 2018-04-10

## 2018-04-10 RX ORDER — LISINOPRIL 2.5 MG/1
1 TABLET ORAL
Qty: 0 | Refills: 0 | COMMUNITY

## 2018-04-10 RX ORDER — PANTOPRAZOLE SODIUM 20 MG/1
40 TABLET, DELAYED RELEASE ORAL
Qty: 0 | Refills: 0 | Status: DISCONTINUED | OUTPATIENT
Start: 2018-04-10 | End: 2018-04-13

## 2018-04-10 RX ORDER — APIXABAN 5 MG/1
TABLET, FILM COATED ORAL
Refills: 0 | Status: ACTIVE | COMMUNITY

## 2018-04-10 RX ORDER — FAMOTIDINE 10 MG/ML
1 INJECTION INTRAVENOUS
Qty: 0 | Refills: 0 | COMMUNITY

## 2018-04-10 NOTE — H&P ADULT - PROBLEM SELECTOR PLAN 6
-IMPROVE VTE Individual Risk Assessment          RISK                                                          Points  [x  ] Previous VTE                                                3  [  ] Thrombophilia                                             2  [  ] Lower limb paralysis                                   2        (unable to hold up >15 seconds)    [  ] Current Cancer                                            2         (within 6 months)  [  ] Immobilization > 24 hrs                             1  [  ] ICU/CCU stay > 24 hours                           1  [ x ] Age > 60                                                       1  IMPROVE VTE Score _____4____  -Pt has anemia likely due to GI bleeding, will hold off DVT PPx, will apply venodyne boots and early ambulation.

## 2018-04-10 NOTE — ED PROVIDER NOTE - CONDUCTED A DETAILED DISCUSSION WITH PATIENT AND/OR GUARDIAN REGARDING, MDM
radiology results/lab results/need for outpatient follow-up need to admit/radiology results/lab results

## 2018-04-10 NOTE — ED PROVIDER NOTE - OBJECTIVE STATEMENT
61 y/o F pt with PMHx of Constipation, DVT, HTN, and SBO (x5; s/p surgery) and PSHx of Intestinal Surgery (x5 surgeries in the past; last surgery in February of 2018) presents to ED c/o with low blood count x2 days. Per pt, pt was discharged from the hospital several weeks ago following admission for SBO and surgery; pt had a x2 week f/u appointment with her PMD several days ago and had blood work performed, with pt being noted to have a low hemoglobin level x2 days ago. Pt states she visited her PMD again today, and was ultimately sent to the ED for a blood transfusion. Pt additionally reports b/l leg swelling and b/l leg weakness x2-3 weeks, as well as general fatigue and general weakness. Pt states she has been able to pass gas. Pt denies CP, palpitations, SOB, dyspnea on exertion, vomiting, or any other complaints. Pt also denies Hx of smoking, Hx of DM, Hx of heart problems, Hx of CVAs, Hx of HLD, Hx of asthma, recent travel, or Hx of bleeding in the past requiring a blood transfusion. Pt notes having a PICC line in place in the L arm. Pt also notes normal colored stool. Per pt's paperwork, pt with recent Doppler study performed in b/l legs, which was negative. PMD: Dr. Rosamaria NORWOOD.

## 2018-04-10 NOTE — H&P ADULT - PROBLEM SELECTOR PLAN 1
-Normocytic normochromic anemia, iron panel, B12, folate, LDH, bilirubin all WNL(although iron level and iron saturation are borderline, low side). Starting FeSo4 once daily.  -f/u ferritin, reti count, and haptoglobin level  -Hold [place], consulted GI Dr. Chatterjee and hem/onc Dr. Rivera  -Will transfuse 1 PRBC overnight, and f/u CBC in AM. -Normocytic normochromic anemia, iron panel, B12, folate, LDH, bilirubin all WNL(although iron level and iron saturation are borderline, low side). Starting FeSo4 once daily.  -f/u ferritin, reti count, and haptoglobin level  -Likely from GI occult bleeding given recent GI surgery and positive FOBT.  -Harshal Lizarraga, consulted GI Dr. Chatterjee and hem/onc Dr. Rivera  -Will transfuse 1 PRBC overnight(with 20mg IV lasix), and f/u CBC in AM.

## 2018-04-10 NOTE — H&P ADULT - PROBLEM SELECTOR PLAN 5
- -Pt takes amlodipine, and lisinopril.  -SBP at 110's, will hold home med for now as BP is in acceptable range.  -Monitor BP, restart meds as appropriate.

## 2018-04-10 NOTE — H&P ADULT - PROBLEM SELECTOR PLAN 3
-Known provoked Lt upper extremity DVT, decreased since last admission  -Will hold Eliquis for concern for GI bleeding  -Venodyne boots on lower extremities.

## 2018-04-10 NOTE — H&P ADULT - NSHPLABSRESULTS_GEN_ALL_CORE
LABS:                        7.4    6.4   )-----------( 408      ( 10 Apr 2018 17:47 )             23.8     04-10    142  |  108  |  15  ----------------------------<  83  3.8   |  30  |  0.89    Ca    7.8<L>      10 Apr 2018 19:34  Mg     1.8     04-10    TPro  5.5<L>  /  Alb  2.2<L>  /  TBili  0.2  /  DBili  x   /  AST  24  /  ALT  27  /  AlkPhos  63  04-10    PT/INR - ( 10 Apr 2018 17:47 )   PT: 13.1 sec;   INR: 1.20 ratio         PTT - ( 10 Apr 2018 17:47 )  PTT:34.8 sec    CAPILLARY BLOOD GLUCOSE      POCT Blood Glucose.: 103 mg/dL (10 Apr 2018 16:23)    LIVER FUNCTIONS - ( 10 Apr 2018 19:34 )  Alb: 2.2 g/dL / Pro: 5.5 g/dL / ALK PHOS: 63 U/L / ALT: 27 U/L DA / AST: 24 U/L / GGT: x             CARDIAC MARKERS ( 10 Apr 2018 19:34 )  <0.015 ng/mL / x     / 77 U/L / x     / <1.0 ng/mL    < from: US Duplex Venous Upper Ext Ltd, Left (04.10.18 @ 20:16) >    IMPRESSION:     Decreased clot burden within the left upper extremity. Partial occlusive   thrombus within the axillary vein and one of the brachial veins.   Superficial thrombophlebitis within the basilic vein in the upper arm.    < end of copied text >

## 2018-04-10 NOTE — ED ADULT TRIAGE NOTE - CHIEF COMPLAINT QUOTE
Patient sent from primary doctor for evaluation for generalized weakness with bilateral LE swelling x few weeks.

## 2018-04-10 NOTE — H&P ADULT - ASSESSMENT
Patient is a 62y old  Female who presents with a chief complaint of generalized weakness. Is admitted to the medicine floor for anemia likely from GI loss.

## 2018-04-10 NOTE — ED ADULT NURSE REASSESSMENT NOTE - NS ED NURSE REASSESS COMMENT FT1
Patient remains hemodynamically stable , alert and orientedx3, in no acute distress, able to speak in full sentences and breathe comfortably in room air. Meal provided ; well tolerated. Patient verbalizes no medical complaints.

## 2018-04-10 NOTE — H&P ADULT - HISTORY OF PRESENT ILLNESS
62 years old female from home, walks independently, no HHA, lives with daughter, with PMH of HTN, recurrent SBO's(s/p adhesion lysis on 2/13/18 by Dr. Guerrero), s/p TPN via PICC line in E on 2/16- 2/23, B/L leg swelling, anemia(Hb 7.5-8.5), and left arm DVT on Lovenox, was sent from Dr. Ross as her Hb was low, need for transfusion and monitoring H/H. Pt was recently admitted for SBO requiring surgery, PICC line insertion on the left arm for TPN, which was complicated by left arm DVT. Was discharged with Eliquis. She was seen by Dr. Ross last Friday and get the blood test done as a follow-up, and was called from his office on Sunday that she needs transfusion for anemia and evaluation of bilateral leg swelling. She underwent lower leg doppler which was negative for DVT, and was discharged from ED with outpatient f/u. Currently she says that her legs are somewhat better than yesterday. She went to Dr. Ross's office again today and he recommended she re-visit ED for concern for GI bleeding, as her Hb was further dropping. Pt reports generalized weakness and fatigue but denies SOB, chest pain, fever, chills, abdominal pain, or any other symptoms.    In ED, 62 years old female from home, walks independently, no HHA, lives with daughter, with PMH of HTN, recurrent SBO's(s/p adhesion lysis on 2/13/18 by Dr. Guerrero), s/p TPN via PICC line in E on 2/16- 2/23, B/L leg swelling, anemia(Hb 7.5-8.5), and left arm DVT on Lovenox, was sent from Dr. Ross as her Hb was low, need for transfusion and monitoring H/H. Pt was recently admitted for SBO requiring surgery, PICC line insertion on the left arm for TPN, which was complicated by left arm DVT. Was discharged with Eliquis. She was seen by Dr. Ross last Friday and get the blood test done as a follow-up, and was called from his office on Sunday that she needs transfusion for anemia and evaluation of bilateral leg swelling. She underwent lower leg doppler which was negative for DVT, and was discharged from ED with outpatient f/u. Currently she says that her legs are somewhat better than yesterday. She went to Dr. Ross's office again today and he recommended she re-visit ED for concern for GI bleeding, as her Hb was further dropping. Pt reports generalized weakness and fatigue but denies melena, hematochezia, N/V, SOB, chest pain, fever, chills, abdominal pain, or any other symptoms. Last EGD/Colonoscopy: last year, Pt believes everything was fine.    In ED, pt was initially mild tachycardic to 104 but later it improved, other VS all stable. Labs significant for H/H of 7.4/23.8 with positive Guaiac, albumin 2.2 with protein of 5.5. EKG: NSR at 73 BPM. Repeated left arm doppler shows decreased clot burden within the left upper extremity. Partial occlusive thrombus within the axillary vein and one of the brachial veins. Superficial thrombophlebitis within the basilic vein in the upper arm. Is admitted to the medicine floor for anemia likely from GI loss. 62 years old female from home, walks independently, no HHA, lives with daughter, with PMH of HTN, recurrent SBO's(s/p adhesion lysis on 2/13/18 by Dr. Guerrero), s/p TPN via PICC line in E on 2/16- 2/23, B/L leg swelling, anemia(Hb 7.5-8.5), and left arm DVT on Lovenox, was sent from Dr. Ross as her Hb was low, need for transfusion and monitoring H/H. Pt was recently admitted for SBO requiring surgery, PICC line insertion on the left arm for TPN, which was complicated by left arm DVT. Was discharged with Eliquis. She was seen by Dr. Ross last Friday and get the blood test done as a follow-up, and was called from his office on Sunday that she needs transfusion for anemia and evaluation of bilateral leg swelling. She underwent lower leg doppler which was negative for DVT, and was discharged from ED with outpatient f/u. Currently she says that her legs are somewhat better than yesterday. She went to Dr. Ross's office again today and he recommended she re-visit ED for concern for GI bleeding, as her Hb was further dropping. Pt reports generalized weakness and fatigue but denies melena, hematochezia, N/V, SOB, chest pain, fever, chills, abdominal pain, or any other symptoms. Last EGD/Colonoscopy: last year, Pt believes everything was fine.    In ED, pt was initially mild tachycardic to 104 but later it improved, other VS all stable. Labs significant for H/H of 7.4/23.8 (normocytic normochromic) with positive Guaiac, albumin 2.2 with protein of 5.5. EKG: NSR at 73 BPM. Repeated left arm doppler shows decreased clot burden within the left upper extremity. Partial occlusive thrombus within the axillary vein and one of the brachial veins. Superficial thrombophlebitis within the basilic vein in the upper arm. Is admitted to the medicine floor for anemia likely from GI loss.

## 2018-04-10 NOTE — ED PROVIDER NOTE - MUSCULOSKELETAL, MLM
Spine appears normal, range of motion is not limited, no muscle or joint tenderness, christel lower ext- 4+ pitting edema, no calves tenderness

## 2018-04-10 NOTE — H&P ADULT - PROBLEM SELECTOR PLAN 2
-No active bleeding, pt denies having melena or hematochezia. VS stable  -Will hold Eliquis for now, and consult GI Dr. Chatterjee.  -Monitor H/H

## 2018-04-11 LAB
ANION GAP SERPL CALC-SCNC: 6 MMOL/L — SIGNIFICANT CHANGE UP (ref 5–17)
APTT BLD: 30.9 SEC — SIGNIFICANT CHANGE UP (ref 27.5–37.4)
BASOPHILS # BLD AUTO: 0.1 K/UL — SIGNIFICANT CHANGE UP (ref 0–0.2)
BASOPHILS NFR BLD AUTO: 1 % — SIGNIFICANT CHANGE UP (ref 0–2)
BUN SERPL-MCNC: 14 MG/DL — SIGNIFICANT CHANGE UP (ref 7–18)
CALCIUM SERPL-MCNC: 7.7 MG/DL — LOW (ref 8.4–10.5)
CHLORIDE SERPL-SCNC: 108 MMOL/L — SIGNIFICANT CHANGE UP (ref 96–108)
CHOLEST SERPL-MCNC: 54 MG/DL — SIGNIFICANT CHANGE UP (ref 10–199)
CO2 SERPL-SCNC: 27 MMOL/L — SIGNIFICANT CHANGE UP (ref 22–31)
CREAT SERPL-MCNC: 0.71 MG/DL — SIGNIFICANT CHANGE UP (ref 0.5–1.3)
EOSINOPHIL # BLD AUTO: 0.1 K/UL — SIGNIFICANT CHANGE UP (ref 0–0.5)
EOSINOPHIL NFR BLD AUTO: 1 % — SIGNIFICANT CHANGE UP (ref 0–6)
FERRITIN SERPL-MCNC: 13 NG/ML — LOW (ref 15–150)
FOLATE SERPL-MCNC: 12.9 NG/ML — SIGNIFICANT CHANGE UP (ref 4.8–24.2)
GLUCOSE SERPL-MCNC: 68 MG/DL — LOW (ref 70–99)
HAPTOGLOB SERPL-MCNC: 90 MG/DL — SIGNIFICANT CHANGE UP (ref 34–200)
HAPTOGLOB SERPL-MCNC: 90 MG/DL — SIGNIFICANT CHANGE UP (ref 34–200)
HAV IGM SER-ACNC: SIGNIFICANT CHANGE UP
HBA1C BLD-MCNC: 4.6 % — SIGNIFICANT CHANGE UP (ref 4–5.6)
HBV CORE IGM SER-ACNC: SIGNIFICANT CHANGE UP
HBV SURFACE AG SER-ACNC: SIGNIFICANT CHANGE UP
HCT VFR BLD CALC: 26.2 % — LOW (ref 34.5–45)
HCT VFR BLD CALC: 26.5 % — LOW (ref 34.5–45)
HCV AB S/CO SERPL IA: 0.17 S/CO — SIGNIFICANT CHANGE UP
HCV AB SERPL-IMP: SIGNIFICANT CHANGE UP
HDLC SERPL-MCNC: 32 MG/DL — LOW (ref 40–125)
HGB BLD-MCNC: 7.7 G/DL — LOW (ref 11.5–15.5)
HGB BLD-MCNC: 7.9 G/DL — LOW (ref 11.5–15.5)
INR BLD: 1.2 RATIO — HIGH (ref 0.88–1.16)
LIPID PNL WITH DIRECT LDL SERPL: 13 MG/DL — SIGNIFICANT CHANGE UP
LYMPHOCYTES # BLD AUTO: 1.6 K/UL — SIGNIFICANT CHANGE UP (ref 1–3.3)
LYMPHOCYTES # BLD AUTO: 25.7 % — SIGNIFICANT CHANGE UP (ref 13–44)
MAGNESIUM SERPL-MCNC: 1.7 MG/DL — SIGNIFICANT CHANGE UP (ref 1.6–2.6)
MCHC RBC-ENTMCNC: 27.5 PG — SIGNIFICANT CHANGE UP (ref 27–34)
MCHC RBC-ENTMCNC: 27.8 PG — SIGNIFICANT CHANGE UP (ref 27–34)
MCHC RBC-ENTMCNC: 29.4 GM/DL — LOW (ref 32–36)
MCHC RBC-ENTMCNC: 29.7 GM/DL — LOW (ref 32–36)
MCV RBC AUTO: 93.2 FL — SIGNIFICANT CHANGE UP (ref 80–100)
MCV RBC AUTO: 93.4 FL — SIGNIFICANT CHANGE UP (ref 80–100)
MONOCYTES # BLD AUTO: 0.5 K/UL — SIGNIFICANT CHANGE UP (ref 0–0.9)
MONOCYTES NFR BLD AUTO: 8.4 % — SIGNIFICANT CHANGE UP (ref 2–14)
NEUTROPHILS # BLD AUTO: 4 K/UL — SIGNIFICANT CHANGE UP (ref 1.8–7.4)
NEUTROPHILS NFR BLD AUTO: 64 % — SIGNIFICANT CHANGE UP (ref 43–77)
PHOSPHATE SERPL-MCNC: 3 MG/DL — SIGNIFICANT CHANGE UP (ref 2.5–4.5)
PLATELET # BLD AUTO: 353 K/UL — SIGNIFICANT CHANGE UP (ref 150–400)
PLATELET # BLD AUTO: 358 K/UL — SIGNIFICANT CHANGE UP (ref 150–400)
POTASSIUM SERPL-MCNC: 4.1 MMOL/L — SIGNIFICANT CHANGE UP (ref 3.5–5.3)
POTASSIUM SERPL-SCNC: 4.1 MMOL/L — SIGNIFICANT CHANGE UP (ref 3.5–5.3)
PROTHROM AB SERPL-ACNC: 13.1 SEC — HIGH (ref 9.8–12.7)
RBC # BLD: 2.66 M/UL — LOW (ref 3.8–5.2)
RBC # BLD: 2.81 M/UL — LOW (ref 3.8–5.2)
RBC # BLD: 2.84 M/UL — LOW (ref 3.8–5.2)
RBC # FLD: 15.9 % — HIGH (ref 10.3–14.5)
RBC # FLD: 16.9 % — HIGH (ref 10.3–14.5)
RETICS #: 101.9 K/UL — SIGNIFICANT CHANGE UP (ref 25–125)
RETICS/RBC NFR: 3.8 % — HIGH (ref 0.5–2.5)
SODIUM SERPL-SCNC: 141 MMOL/L — SIGNIFICANT CHANGE UP (ref 135–145)
TOTAL CHOLESTEROL/HDL RATIO MEASUREMENT: 1.7 RATIO — LOW (ref 3.3–7.1)
TRANSFERRIN SERPL-MCNC: 149 MG/DL — LOW (ref 200–360)
TRIGL SERPL-MCNC: 45 MG/DL — SIGNIFICANT CHANGE UP (ref 10–149)
TSH SERPL-MCNC: 1.17 UU/ML — SIGNIFICANT CHANGE UP (ref 0.34–4.82)
VIT B12 SERPL-MCNC: 540 PG/ML — SIGNIFICANT CHANGE UP (ref 232–1245)
WBC # BLD: 6.3 K/UL — SIGNIFICANT CHANGE UP (ref 3.8–10.5)
WBC # BLD: 6.4 K/UL — SIGNIFICANT CHANGE UP (ref 3.8–10.5)
WBC # FLD AUTO: 6.3 K/UL — SIGNIFICANT CHANGE UP (ref 3.8–10.5)
WBC # FLD AUTO: 6.4 K/UL — SIGNIFICANT CHANGE UP (ref 3.8–10.5)

## 2018-04-11 RX ORDER — SOD SULF/SODIUM/NAHCO3/KCL/PEG
4000 SOLUTION, RECONSTITUTED, ORAL ORAL ONCE
Qty: 0 | Refills: 0 | Status: COMPLETED | OUTPATIENT
Start: 2018-04-11 | End: 2018-04-11

## 2018-04-11 RX ADMIN — Medication 20 MILLIGRAM(S): at 00:04

## 2018-04-11 RX ADMIN — Medication 4000 MILLILITER(S): at 17:14

## 2018-04-11 RX ADMIN — Medication 325 MILLIGRAM(S): at 11:26

## 2018-04-11 RX ADMIN — PANTOPRAZOLE SODIUM 40 MILLIGRAM(S): 20 TABLET, DELAYED RELEASE ORAL at 05:51

## 2018-04-11 RX ADMIN — PANTOPRAZOLE SODIUM 40 MILLIGRAM(S): 20 TABLET, DELAYED RELEASE ORAL at 00:04

## 2018-04-11 NOTE — CONSULT NOTE ADULT - SUBJECTIVE AND OBJECTIVE BOX
62 year old female with recurerent SBO had surgery to romove part of colon and small intestine.  She had adhesion lysis in feb and required TPN via a PICC line at left arm.  it was complicated with DVT in March.  Eliquis was started.  she has no pain, sob, fever, chills,.  She is able to eat and has BM.  but abd tends to be bloated after eating.  no blood in stool.  but she became more anemic and felt weaker than before.   she had 1 unit of PRBC last night,  alert, not in distress. no palpable nodes < from: US Duplex Venous Upper Ext Ltd, Left (04.10.18 @ 20:16) >    INTERPRETATION:  Clinical Information: Left upper extremity DVT, follow-up    Comparison: 3/20/2018    Technique: Spectral and color Doppler ultrasound of the left upper   extremity.    FINDINGS:    LEFT - There is normal compression and flow dynamics within the internal   jugular, visualized subclavian, one of the brachial veins and radial and   ulnar veins. There is partially occlusive thrombuswithin the axillary   vein as well as thrombosis of one of the brachial veins.    The cephalic vein is patent and compressible.    There is thrombus within the basilic vein within the upper arm.    IMPRESSION:     Decreased clot burden within the left upper extremity. Partial occlusive   thrombus within the axillary vein and one of the brachial veins.   Superficial thrombophlebitis within the basilic vein in the upper arm.    < end of copied text >  at neck. chest is clear. abd is soft and distended with air.  1+edema bilat.                        7.7    6.4   )-----------( 358      ( 11 Apr 2018 07:33 )             26.2   04-11    141  |  108  |  14  ----------------------------<  68<L>  4.1   |  27  |  0.71    Ca    7.7<L>      11 Apr 2018 07:33  Phos  3.0     04-11  Mg     1.7     04-11    TPro  5.5<L>  /  Alb  2.2<L>  /  TBili  0.2  /  DBili  x   /  AST  24  /  ALT  27  /  AlkPhos  63  04-10   FOBT positive
[  ] STAT REQUEST              [ X ] ROUTINE REQUEST    Patient is a 62 year old female admitted with anemia associated with positive  occult blood in stool while on Eliquise. GI consulted to evaluate.      HPI:  62 year old female with multiple medical problem including DVT on Eliquis now admitted with sever anemia associated with positive occult blood in stool. Patient denies abdominal pain, nausea, vomiting, hematemesis, hematochezia, melena, fever, chills, chest pain, SOB, hematuria, dysuria or diarrhea.              PAIN MANAGEMENT:  Pain Scale:                 0/10  Pain Location:      Prior Colonoscopy: Unknown    PAST MEDICAL HISTORY  Constipation  DVT (deep venous thrombosis)  SBO (small bowel obstruction)  HTN (hypertension)      PAST SURGICAL HISTORY  History of intestinal surgery      Allergies    No Known Allergies         MEDICATIONS  (STANDING):  ferrous    sulfate 325 milliGRAM(s) Oral daily  pantoprazole    Tablet 40 milliGRAM(s) Oral before breakfast  polyethylene glycol/electrolyte Solution. 4000 milliLiter(s) Oral once         SOCIAL HISTORY  Advanced Directives:       [ X ] Full Code       [  ] DNR  Marital Status:         [  X] M      [  ] S      [  ] D       [  ] W  Children:       [ X ] Yes      [  ] No  Occupation:        [  ] Employed       [ X ] Unemployed       [  ] Retired  Diet:       [X  ] Regular       [  ] PEG feeding          [  ] NG tube feeding  Drug Use:           [ X ] Patient denied          [  ] Yes  Alcohol:           [ X ] No             [  ] Yes (socially)         [  ] Yes (chronic)  Tobacco:           [  ] Yes           [X  ] No      FAMILY HISTORY  [ X ] Heart Disease            [  ] Diabetes             [  ] HTN             [  ] Colon Cancer             [  ] Stomach Cancer              [  ] Pancreatic Cancer        VITAL SIGNS   Vital Signs Last 24 Hrs  T(C): 36.9 (11 Apr 2018 14:25), Max: 36.9 (10 Apr 2018 21:04)  T(F): 98.5 (11 Apr 2018 14:25), Max: 98.5 (11 Apr 2018 14:25)  HR: 71 (11 Apr 2018 14:25) (62 - 85)  BP: 127/73 (11 Apr 2018 14:25) (104/64 - 127/73)  RR: 16 (11 Apr 2018 14:25) (16 - 19)  SpO2: 99% (11 Apr 2018 14:25) (97% - 100%)  Daily Height in cm: 167.64 (11 Apr 2018 00:27)               CBC Full  -  ( 11 Apr 2018 15:20 )  WBC Count : 6.3 K/uL  Hemoglobin : 7.9 g/dL  Hematocrit : 26.5 %  Platelet Count - Automated : 353 K/uL  Mean Cell Volume : 93.4 fl  Mean Cell Hemoglobin : 27.8 pg  Mean Cell Hemoglobin Concentration : 29.7 gm/dL  Auto Neutrophil # : 4.0 K/uL  Auto Lymphocyte # : 1.6 K/uL  Auto Monocyte # : 0.5 K/uL  Auto Eosinophil # : 0.1 K/uL  Auto Basophil # : 0.1 K/uL  Auto Neutrophil % : 64.0 %  Auto Lymphocyte % : 25.7 %  Auto Monocyte % : 8.4 %  Auto Eosinophil % : 1.0 %  Auto Basophil % : 1.0 %      04-11    141  |  108  |  14  ----------------------------<  68<L>  4.1   |  27  |  0.71    Ca    7.7<L>      11 Apr 2018 07:33  Phos  3.0     04-11  Mg     1.7     04-11    TPro  5.5<L>  /  Alb  2.2<L>  /  TBili  0.2  /  DBili  x   /  AST  24  /  ALT  27  /  AlkPhos  63  04-10    PT/INR - ( 11 Apr 2018 07:33 )   PT: 13.1 sec;   INR: 1.20 ratio       PTT - ( 11 Apr 2018 07:33 )  PTT:30.9 sec      Occult Blood, Feces (04.10.18 @ 18:27)    Occult Blood, Feces: Positive    Iron with Total Binding Capacity (04.10.18 @ 17:50)    % Saturation, Iron: 16 %    Iron - Total Binding Capacity.: 271 ug/dL    Iron Total, Serum: 43 ug/dL    Unsaturated Iron Binding Capacity: 228 ug/dL    Urinalysis (02.08.18 @ 10:43)    pH Urine: 5.0    Glucose Qualitative, Urine: Negative    Blood, Urine: Negative    Color: Yellow    Urine Appearance: Clear    Bilirubin: Small    Ketone - Urine: Trace    Specific Gravity: 1.025    Protein, Urine: 30 mg/dL    Urobilinogen: 1    Nitrite: Negative    Leukocyte Esterase Concentration: Negative    ECG  Ventricular Rate 90 BPM    Atrial Rate 90 BPM    P-R Interval 140 ms    QRS Duration 84 ms     ms    QTc 438 ms    P Axis 42 degrees    R Axis -11 degrees    T Axis -9 degrees    Diagnosis Line Normal sinus rhythm  Possible Left atrial enlargement  Left ventricular hypertrophy  Abnormal ECG    RADIOLOGY/IMAGING                EXAM:  CT ABDOMEN AND PELVIS IC                            PROCEDURE DATE:  02/08/2018          INTERPRETATION:  CT abdomen and pelvis. Patient has abdominal bloating   and vomiting. Patient received 97 cc of Omnipaque 350 IV by power   injector. 3cc was discarded. Patient was unable to tolerate oral prep.    The lower heart is upper normal in size. The lung bases are unremarkable.   There is some mild to moderate hiatal hernia somewhat more pronounced   than on April 4, 2017.    In the abdomen gallstones and a contracted gallbladder again noted.    The liver, spleen, and pancreas appear within normal limits.    The kidneys appear normal in size, shape, and axis. There are no renal   stones. The kidneys function equally without hydronephrosis.    The abdominal aorta is unremarkable. No celiac or periaortic adenopathy   is evident.    CT pelvis.    The bladder is decompressed. The uterus is grossly normal. No iliac or   inguinal adenopathy is evident.     There is slight pelvic fluid.    The colon is largely decompressed. There is likely been right colectomy   and this appears new since April 4, 2017. The small bowel is presently   markedly distended consistent with bowel obstruction. Small bowel loops   measure up to 7.5 cm in diameter. Zone of transition may be in the left   upper quadrant at the small bowel colonic anastomosis.    There is diffuse mild left lumbar curve.    The degree of small bowel distention is actually increased from prior   study of April 4, 2017.    IMPRESSION: There is likely been right colectomy since April 2017. There   is now a significant small bowel obstruction likely at the small bowel   colonic anastomosis in the left upper quadrant. Slight fluid is seen in   the pelvis. Gallstones and incidental findings as above.       EXAM:  XR CHEST AP OR PA 1V                            PROCEDURE DATE:  04/10/2018          INTERPRETATION:  Weakness.    AP chest.    Heart magnified by AP film shallow inspiration. There is a left   ventricular configuration. Unfolding calcination thoracic aorta. No focal   consolidation or pleural effusion. Gaseous distention of bowel in the   visualized upper abdomen. Correlate clinically.    Impression: As above

## 2018-04-11 NOTE — CONSULT NOTE ADULT - PROBLEM SELECTOR RECOMMENDATION 9
DVT of left arm due to PICC line and TPN.  it is better than before but still significant.  Upper extremities DVT only has 10% chance to cause PE.  she has been on eliquis  it is ok to hold eliquis for a few days to work on possible GIB.  but premature stop of NOAC can worsen the thrombosis.  will need at least lovenox prophylaxis, or IV heparin

## 2018-04-11 NOTE — CONSULT NOTE ADULT - NEGATIVE ENMT SYMPTOMS
no gum bleeding/no throat pain/no hearing difficulty/no nose bleeds/no dry mouth/no dysphagia/no ear pain

## 2018-04-11 NOTE — CONSULT NOTE ADULT - ASSESSMENT
anemia and DVT
Anemia associated with positive occult blood in stool and drop in H/H  can be due to:    1. Peptic ulcer disease  2. Stress induced gastric ulcer  3. Rectal ulcer  4. Colorectal neoplasm  5. AVM's    Suggestions:    1. Monitor H/H  2. Transfuse PRBC as needed  3. Protonix daily  4. EGD and colonoscopy  5. Avoid NSAID  6. DVT prophylaxis

## 2018-04-11 NOTE — CONSULT NOTE ADULT - PROBLEM SELECTOR RECOMMENDATION 2
FOBT positive  it can be problem of absorption after intestinal resection.  it also can be bleeding  will need GI w/u  check ferritin b12, folate

## 2018-04-12 ENCOUNTER — RESULT REVIEW (OUTPATIENT)
Age: 63
End: 2018-04-12

## 2018-04-12 LAB
ANION GAP SERPL CALC-SCNC: 6 MMOL/L — SIGNIFICANT CHANGE UP (ref 5–17)
BASOPHILS # BLD AUTO: 0.1 K/UL — SIGNIFICANT CHANGE UP (ref 0–0.2)
BASOPHILS NFR BLD AUTO: 1.5 % — SIGNIFICANT CHANGE UP (ref 0–2)
BUN SERPL-MCNC: 12 MG/DL — SIGNIFICANT CHANGE UP (ref 7–18)
CALCIUM SERPL-MCNC: 7.6 MG/DL — LOW (ref 8.4–10.5)
CHLORIDE SERPL-SCNC: 109 MMOL/L — HIGH (ref 96–108)
CO2 SERPL-SCNC: 28 MMOL/L — SIGNIFICANT CHANGE UP (ref 22–31)
CREAT SERPL-MCNC: 0.65 MG/DL — SIGNIFICANT CHANGE UP (ref 0.5–1.3)
EOSINOPHIL # BLD AUTO: 0.1 K/UL — SIGNIFICANT CHANGE UP (ref 0–0.5)
EOSINOPHIL NFR BLD AUTO: 1.6 % — SIGNIFICANT CHANGE UP (ref 0–6)
FOLATE SERPL-MCNC: 9.9 NG/ML — SIGNIFICANT CHANGE UP (ref 4.8–24.2)
GLUCOSE SERPL-MCNC: 69 MG/DL — LOW (ref 70–99)
HCT VFR BLD CALC: 28.6 % — LOW (ref 34.5–45)
HGB BLD-MCNC: 8.6 G/DL — LOW (ref 11.5–15.5)
LYMPHOCYTES # BLD AUTO: 2.2 K/UL — SIGNIFICANT CHANGE UP (ref 1–3.3)
LYMPHOCYTES # BLD AUTO: 38.1 % — SIGNIFICANT CHANGE UP (ref 13–44)
MAGNESIUM SERPL-MCNC: 1.8 MG/DL — SIGNIFICANT CHANGE UP (ref 1.6–2.6)
MCHC RBC-ENTMCNC: 27.9 PG — SIGNIFICANT CHANGE UP (ref 27–34)
MCHC RBC-ENTMCNC: 29.9 GM/DL — LOW (ref 32–36)
MCV RBC AUTO: 93.2 FL — SIGNIFICANT CHANGE UP (ref 80–100)
MONOCYTES # BLD AUTO: 0.5 K/UL — SIGNIFICANT CHANGE UP (ref 0–0.9)
MONOCYTES NFR BLD AUTO: 9.1 % — SIGNIFICANT CHANGE UP (ref 2–14)
NEUTROPHILS # BLD AUTO: 2.9 K/UL — SIGNIFICANT CHANGE UP (ref 1.8–7.4)
NEUTROPHILS NFR BLD AUTO: 49.8 % — SIGNIFICANT CHANGE UP (ref 43–77)
PHOSPHATE SERPL-MCNC: 2.2 MG/DL — LOW (ref 2.5–4.5)
PLATELET # BLD AUTO: 355 K/UL — SIGNIFICANT CHANGE UP (ref 150–400)
POTASSIUM SERPL-MCNC: 3.7 MMOL/L — SIGNIFICANT CHANGE UP (ref 3.5–5.3)
POTASSIUM SERPL-SCNC: 3.7 MMOL/L — SIGNIFICANT CHANGE UP (ref 3.5–5.3)
RBC # BLD: 3.07 M/UL — LOW (ref 3.8–5.2)
RBC # FLD: 16.7 % — HIGH (ref 10.3–14.5)
SODIUM SERPL-SCNC: 143 MMOL/L — SIGNIFICANT CHANGE UP (ref 135–145)
VIT B12 SERPL-MCNC: 535 PG/ML — SIGNIFICANT CHANGE UP (ref 232–1245)
WBC # BLD: 5.9 K/UL — SIGNIFICANT CHANGE UP (ref 3.8–10.5)
WBC # FLD AUTO: 5.9 K/UL — SIGNIFICANT CHANGE UP (ref 3.8–10.5)

## 2018-04-12 PROCEDURE — 88305 TISSUE EXAM BY PATHOLOGIST: CPT | Mod: 26

## 2018-04-12 PROCEDURE — 88312 SPECIAL STAINS GROUP 1: CPT | Mod: 26

## 2018-04-12 RX ORDER — POTASSIUM PHOSPHATE, MONOBASIC POTASSIUM PHOSPHATE, DIBASIC 236; 224 MG/ML; MG/ML
15 INJECTION, SOLUTION INTRAVENOUS ONCE
Qty: 0 | Refills: 0 | Status: DISCONTINUED | OUTPATIENT
Start: 2018-04-12 | End: 2018-04-12

## 2018-04-12 RX ORDER — SODIUM CHLORIDE 9 MG/ML
1000 INJECTION, SOLUTION INTRAVENOUS
Qty: 0 | Refills: 0 | Status: COMPLETED | OUTPATIENT
Start: 2018-04-12 | End: 2018-04-12

## 2018-04-12 RX ORDER — ENOXAPARIN SODIUM 100 MG/ML
40 INJECTION SUBCUTANEOUS DAILY
Qty: 0 | Refills: 0 | Status: DISCONTINUED | OUTPATIENT
Start: 2018-04-12 | End: 2018-04-13

## 2018-04-12 RX ORDER — POTASSIUM PHOSPHATE, MONOBASIC POTASSIUM PHOSPHATE, DIBASIC 236; 224 MG/ML; MG/ML
15 INJECTION, SOLUTION INTRAVENOUS ONCE
Qty: 0 | Refills: 0 | Status: COMPLETED | OUTPATIENT
Start: 2018-04-12 | End: 2018-04-12

## 2018-04-12 RX ORDER — IRON SUCROSE 20 MG/ML
200 INJECTION, SOLUTION INTRAVENOUS EVERY 24 HOURS
Qty: 0 | Refills: 0 | Status: DISCONTINUED | OUTPATIENT
Start: 2018-04-12 | End: 2018-04-13

## 2018-04-12 RX ADMIN — POTASSIUM PHOSPHATE, MONOBASIC POTASSIUM PHOSPHATE, DIBASIC 62.5 MILLIMOLE(S): 236; 224 INJECTION, SOLUTION INTRAVENOUS at 11:22

## 2018-04-12 RX ADMIN — SODIUM CHLORIDE 75 MILLILITER(S): 9 INJECTION, SOLUTION INTRAVENOUS at 00:57

## 2018-04-12 RX ADMIN — IRON SUCROSE 110 MILLIGRAM(S): 20 INJECTION, SOLUTION INTRAVENOUS at 16:56

## 2018-04-12 NOTE — PROGRESS NOTE ADULT - PROBLEM SELECTOR PLAN 3
- known provoked LUE DVT (PICC line)  - eliquis held for GI bleed
- known provoked LUE DVT (PICC line)  - eliquis held for GI bleed

## 2018-04-12 NOTE — PROGRESS NOTE ADULT - PROBLEM SELECTOR PLAN 2
- no hematochezia, melena, or hematemesis  - serial CBCs  - GI consult- Dr. Chatterjee
- no hematochezia, melena, or hematemesis  - for EGD/colonoscopy today   - GI consult- Dr. Chatterjee

## 2018-04-12 NOTE — PROGRESS NOTE ADULT - PROBLEM SELECTOR PLAN 1
-Normocytic normochromic anemia, iron panel, B12, folate, LDH, bilirubin all WNL(although iron level and iron saturation are borderline, low side). ContinueFeSo4 once daily.  - follow up anemia panel   - likely due to occult GI bleed  - Hold [facility], consulted GI Dr. Chatterjee and hem/onc Dr. Rivera  - s/p 1 PRBC without appropriate response
- stable  - for EGD/colonoscopy today with Dr. Chatterjee

## 2018-04-12 NOTE — PROGRESS NOTE ADULT - PROBLEM SELECTOR PLAN 4
- likely due to anasarca from hypoalbuminemia   - follow up TTE   - Doppler leg negative for DVT on this admission   - follow up nutrition consult   - Leg elevation
- likely due to anasarca from hypoalbuminemia   - follow up TTE   - Doppler leg negative for DVT on this admission   - follow up nutrition consult   - Leg elevation

## 2018-04-12 NOTE — DIETITIAN INITIAL EVALUATION ADULT. - PROBLEM SELECTOR PLAN 5
-Pt takes amlodipine, and lisinopril.  -SBP at 110's, will hold home med for now as BP is in acceptable range.  -Monitor BP, restart meds as appropriate.

## 2018-04-12 NOTE — DIETITIAN INITIAL EVALUATION ADULT. - PROBLEM SELECTOR PLAN 4
-Pro-, normal cardiac enz, less likely CHF, but will check Echo  -Doppler leg negative for DVT  -Likely due to anasarca from low albumin level; will consult nutrition  -Leg elevation

## 2018-04-12 NOTE — DIETITIAN INITIAL EVALUATION ADULT. - OTHER INFO
Pt 170# ~2-3 years agon, recent hospitalization, on TPN, identified with severe PCM. Pt reports better intake last 2 weeks, with small amount weight gain (admit w/2+ B/L LE edema)> NKFA, no food preferences. Reports taking Ensure BID PTA PGY 3 has ordered

## 2018-04-12 NOTE — PROGRESS NOTE ADULT - PROBLEM SELECTOR PLAN 6
-IMPROVE VTE Individual Risk Assessment          RISK                                                          Points  [x  ] Previous VTE                                                3  [  ] Thrombophilia                                             2  [  ] Lower limb paralysis                                   2        (unable to hold up >15 seconds)    [  ] Current Cancer                                            2         (within 6 months)  [  ] Immobilization > 24 hrs                             1  [  ] ICU/CCU stay > 24 hours                           1  [ x ] Age > 60                                                       1  IMPROVE VTE Score: 4  - no VTE
-IMPROVE VTE Individual Risk Assessment          RISK                                                          Points  [x  ] Previous VTE                                                3  [  ] Thrombophilia                                             2  [  ] Lower limb paralysis                                   2        (unable to hold up >15 seconds)    [  ] Current Cancer                                            2         (within 6 months)  [  ] Immobilization > 24 hrs                             1  [  ] ICU/CCU stay > 24 hours                           1  [ x ] Age > 60                                                       1  IMPROVE VTE Score: 4  - no VTE

## 2018-04-12 NOTE — PROGRESS NOTE ADULT - PROBLEM SELECTOR PLAN 5
- held home lisinopril, amlodipine for borderline BP  - monitor blood pressure and restart meds as appropriate
- held home lisinopril, amlodipine for borderline BP  - monitor blood pressure and restart meds as appropriate

## 2018-04-13 ENCOUNTER — TRANSCRIPTION ENCOUNTER (OUTPATIENT)
Age: 63
End: 2018-04-13

## 2018-04-13 VITALS
OXYGEN SATURATION: 100 % | TEMPERATURE: 98 F | SYSTOLIC BLOOD PRESSURE: 127 MMHG | DIASTOLIC BLOOD PRESSURE: 78 MMHG | RESPIRATION RATE: 18 BRPM | HEART RATE: 89 BPM

## 2018-04-13 LAB
ANION GAP SERPL CALC-SCNC: 8 MMOL/L — SIGNIFICANT CHANGE UP (ref 5–17)
BASOPHILS # BLD AUTO: 0.1 K/UL — SIGNIFICANT CHANGE UP (ref 0–0.2)
BASOPHILS NFR BLD AUTO: 1.9 % — SIGNIFICANT CHANGE UP (ref 0–2)
BUN SERPL-MCNC: 8 MG/DL — SIGNIFICANT CHANGE UP (ref 7–18)
CALCIUM SERPL-MCNC: 7.6 MG/DL — LOW (ref 8.4–10.5)
CHLORIDE SERPL-SCNC: 108 MMOL/L — SIGNIFICANT CHANGE UP (ref 96–108)
CO2 SERPL-SCNC: 25 MMOL/L — SIGNIFICANT CHANGE UP (ref 22–31)
CREAT SERPL-MCNC: 0.68 MG/DL — SIGNIFICANT CHANGE UP (ref 0.5–1.3)
EOSINOPHIL # BLD AUTO: 0.1 K/UL — SIGNIFICANT CHANGE UP (ref 0–0.5)
EOSINOPHIL NFR BLD AUTO: 1.7 % — SIGNIFICANT CHANGE UP (ref 0–6)
GLUCOSE SERPL-MCNC: 67 MG/DL — LOW (ref 70–99)
HCT VFR BLD CALC: 27.4 % — LOW (ref 34.5–45)
HGB BLD-MCNC: 8.6 G/DL — LOW (ref 11.5–15.5)
LYMPHOCYTES # BLD AUTO: 1.8 K/UL — SIGNIFICANT CHANGE UP (ref 1–3.3)
LYMPHOCYTES # BLD AUTO: 33.6 % — SIGNIFICANT CHANGE UP (ref 13–44)
MAGNESIUM SERPL-MCNC: 1.8 MG/DL — SIGNIFICANT CHANGE UP (ref 1.6–2.6)
MCHC RBC-ENTMCNC: 29.5 PG — SIGNIFICANT CHANGE UP (ref 27–34)
MCHC RBC-ENTMCNC: 31.4 GM/DL — LOW (ref 32–36)
MCV RBC AUTO: 93.8 FL — SIGNIFICANT CHANGE UP (ref 80–100)
MONOCYTES # BLD AUTO: 0.5 K/UL — SIGNIFICANT CHANGE UP (ref 0–0.9)
MONOCYTES NFR BLD AUTO: 9.2 % — SIGNIFICANT CHANGE UP (ref 2–14)
NEUTROPHILS # BLD AUTO: 2.8 K/UL — SIGNIFICANT CHANGE UP (ref 1.8–7.4)
NEUTROPHILS NFR BLD AUTO: 53.6 % — SIGNIFICANT CHANGE UP (ref 43–77)
PHOSPHATE SERPL-MCNC: 2.7 MG/DL — SIGNIFICANT CHANGE UP (ref 2.5–4.5)
PLATELET # BLD AUTO: 274 K/UL — SIGNIFICANT CHANGE UP (ref 150–400)
POTASSIUM SERPL-MCNC: 4.1 MMOL/L — SIGNIFICANT CHANGE UP (ref 3.5–5.3)
POTASSIUM SERPL-SCNC: 4.1 MMOL/L — SIGNIFICANT CHANGE UP (ref 3.5–5.3)
RBC # BLD: 2.92 M/UL — LOW (ref 3.8–5.2)
RBC # FLD: 16.6 % — HIGH (ref 10.3–14.5)
SODIUM SERPL-SCNC: 141 MMOL/L — SIGNIFICANT CHANGE UP (ref 135–145)
WBC # BLD: 5.3 K/UL — SIGNIFICANT CHANGE UP (ref 3.8–10.5)
WBC # FLD AUTO: 5.3 K/UL — SIGNIFICANT CHANGE UP (ref 3.8–10.5)

## 2018-04-13 RX ORDER — FERROUS SULFATE 325(65) MG
1 TABLET ORAL
Qty: 30 | Refills: 0 | OUTPATIENT
Start: 2018-04-13 | End: 2018-05-12

## 2018-04-13 RX ORDER — FONDAPARINUX SODIUM 2.5 MG/.5ML
1 INJECTION, SOLUTION SUBCUTANEOUS
Qty: 30 | Refills: 0 | OUTPATIENT
Start: 2018-04-13 | End: 2018-05-12

## 2018-04-13 RX ORDER — APIXABAN 2.5 MG/1
5 TABLET, FILM COATED ORAL EVERY 12 HOURS
Qty: 0 | Refills: 0 | Status: DISCONTINUED | OUTPATIENT
Start: 2018-04-13 | End: 2018-04-13

## 2018-04-13 RX ADMIN — PANTOPRAZOLE SODIUM 40 MILLIGRAM(S): 20 TABLET, DELAYED RELEASE ORAL at 05:15

## 2018-04-13 RX ADMIN — IRON SUCROSE 110 MILLIGRAM(S): 20 INJECTION, SOLUTION INTRAVENOUS at 18:04

## 2018-04-13 NOTE — DISCHARGE NOTE ADULT - CARE PLAN
Principal Discharge DX:	Anemia  Goal:	to determine cause and treat  Assessment and plan of treatment:	Please take pantoprazole and iron supplement as prescribed. Follow up with Dr. Ross and Dr. Chatterjee for biopsy results within 5 days of discharge.  Secondary Diagnosis:	DVT (deep venous thrombosis)  Assessment and plan of treatment:	STOP taking eliquis. Begin taking xarelto (rivaroxaban) as prescribed. Follow up with Dr. Ross within 5 days of discharge.

## 2018-04-13 NOTE — DISCHARGE NOTE ADULT - HOSPITAL COURSE
Patient is a 62 year-old female from home with PMH of HTN, recurrent SBOs, LUE DVT (from PICC line for TPN after adhesion lysis in February 2018) who was sent to the hospital by her PCP, Dr. Ross for anemia. Patient was taking eliquis for DVT in left arm. She was admitted to medicine for concern for GI bleed. She had generalized weakness but denied melena, hematochezia, nausea, vomiting, chest pain, shortness of breath. Patient was mildly tachycardic to 104 but HR improved with IV fluids. Labs were significant for hemoglobin/hematocrit of 7.4/23.8 (normocytic, normochromic), FOBT positive, albumin 2.2, total protein 5.5. Left arm doppler showed decreased clot burden, partial occlusive thrombus within the axillary vein and one of the brachial veins, superficial thrombophlebitis within the basilic vein.     She received 1 unit PRBC, Patient is a 62 year-old female from home with PMH of HTN, recurrent SBOs, LUE DVT (from PICC line for TPN after adhesion lysis in February 2018) who was sent to the hospital by her PCP, Dr. Ross for anemia. Patient was taking eliquis for DVT in left arm. She was admitted to medicine for concern for GI bleed. She had generalized weakness but denied melena, hematochezia, nausea, vomiting, chest pain, shortness of breath. Patient was mildly tachycardic to 104 but HR improved with IV fluids. Labs were significant for hemoglobin/hematocrit of 7.4/23.8 (normocytic, normochromic), FOBT positive, albumin 2.2, total protein 5.5. Left arm doppler showed decreased clot burden, partial occlusive thrombus within the axillary vein and one of the brachial veins, superficial thrombophlebitis within the basilic vein.     She received 1 unit PRBC, venofer. Eliquis was held. H/h improved to 8.6/28.6. EGD/colonoscopy was done which showed esophagitis (distal), gastritis, hiatal hernia, diverticulosis. No active bleeding noted. Patient remained hemodynamically stable, H/h stable.    Patient is medically stable for discharge to home. Eliquis was changed to Xarelto as per attending.

## 2018-04-13 NOTE — DISCHARGE NOTE ADULT - CARE PROVIDER_API CALL
Yessica Ross), Medicine  [address] [place] [address]  [place] [address]  Phone: [phone]  Fax: [phone]    Jaguar Chatterjee), Medicine  [address] [place] [address]  Phone: [phone]  Fax: [phone]

## 2018-04-13 NOTE — CHART NOTE - NSCHARTNOTEFT_GEN_A_CORE
Upon Nutritional Assessment by the Registered Dietitian your patient was determined to meet criteria / has evidence of the following diagnosis/diagnoses:          [ ]  Mild Protein Calorie Malnutrition        [ ]  Moderate Protein Calorie Malnutrition        [ ] Severe Protein Calorie Malnutrition        [ ] Unspecified Protein Calorie Malnutrition        [ ] Underweight / BMI <19        [ ] Morbid Obesity / BMI > 40      Findings as based on:  •  Comprehensive nutrition assessment and consultation  •  Calorie counts (nutrient intake analysis)  •  Food acceptance and intake status from observations by staff  •  Follow up  •  Patient education  •  Intervention secondary to interdisciplinary rounds  •   concerns      Treatment:    The following diet has been recommended:  Enlive BID added    PROVIDER Section:     By signing this assessment you are acknowledging and agree with the diagnosis/diagnoses assigned by the Registered Dietitian    Comments: Upon Nutritional Assessment by the Registered Dietitian your patient was determined to meet criteria / has evidence of the following diagnosis/diagnoses:          [ ]  Mild Protein Calorie Malnutrition        [x ]  Moderate Protein Calorie Malnutrition        [ ] Severe Protein Calorie Malnutrition        [ ] Unspecified Protein Calorie Malnutrition        [ ] Underweight / BMI <19        [ ] Morbid Obesity / BMI > 40      Findings as based on:  •  Comprehensive nutrition assessment and consultation  •  Calorie counts (nutrient intake analysis)  •  Food acceptance and intake status from observations by staff  •  Follow up  •  Patient education  •  Intervention secondary to interdisciplinary rounds  •   concerns      Treatment:    The following diet has been recommended:  Enlive BID added    PROVIDER Section:     By signing this assessment you are acknowledging and agree with the diagnosis/diagnoses assigned by the Registered Dietitian    Comments:

## 2018-04-13 NOTE — PROGRESS NOTE ADULT - ASSESSMENT
Patient is a 62 year-old female who presents with a chief complaint of generalized weakness. She is admitted to the medicine floor for anemia likely from GI loss.
Patient is a 62 year-old female who presents with a chief complaint of generalized weakness. She is admitted to the medicine floor for anemia likely from GI loss.
1. Anemia  2. No evidence of acute GI bleeding at present time  3. Capsule endoscopy  4. Avoid NSAID  5. Protonix daily  6. DVT prophylaxis

## 2018-04-13 NOTE — DISCHARGE NOTE ADULT - MEDICATION SUMMARY - MEDICATIONS TO TAKE
I will START or STAY ON the medications listed below when I get home from the hospital:    lisinopril 40 mg oral tablet  -- 10 milligram(s) by mouth once a day  -- Indication: For Essential hypertension    Xarelto 15 mg oral tablet  -- 1 tab(s) by mouth once a day (in the evening)   -- Check with your doctor before becoming pregnant.  It is very important that you take or use this exactly as directed.  Do not skip doses or discontinue unless directed by your doctor.  Obtain medical advice before taking any non-prescription drugs as some may affect the action of this medication.  Take with food.    -- Indication: For DVT (deep venous thrombosis)    amLODIPine 10 mg oral tablet  -- 0.5 tab(s) by mouth once a day  -- Indication: For Essential hypertension    Linzess 145 mcg oral capsule  -- 1 cap(s) by mouth once a day  -- Indication: For Need for prophylactic measure    ferrous sulfate 325 mg (65 mg elemental iron) oral tablet  -- 1 tab(s) by mouth once a day   -- Check with your doctor before becoming pregnant.  Do not chew, break, or crush.  May discolor urine or feces.    -- Indication: For iron deficiency     pantoprazole 40 mg oral delayed release tablet  -- 1 tab(s) by mouth once a day  -- Indication: For GIB (gastrointestinal bleeding)    Vitamin D2 50,000 intl units (1.25 mg) oral capsule  -- orally once a month  -- Indication: For supplement

## 2018-04-13 NOTE — PROGRESS NOTE ADULT - SUBJECTIVE AND OBJECTIVE BOX
PGY-3 NOTE:    Patient is a 62 year-old female with PMH of HTN, recurrent SBOs (s/p adhesion lysis on 2/13/18 by Dr. Guerrero), s/p TPN via PICC line in LUE 2/16-2/23, bilateral lower extremity swelling, anemia, left arm DVT (on lovenox) who was sent to the hospital for anemia by her PCP.       INTERVAL HPI/OVERNIGHT EVENTS: No events overnight. Patient seen and examined at bedside. Denies complaints.     Vital Signs Last 24 Hrs  T(C): 36.5 (12 Apr 2018 05:35), Max: 36.9 (11 Apr 2018 14:25)  T(F): 97.7 (12 Apr 2018 05:35), Max: 98.5 (11 Apr 2018 14:25)  HR: 57 (12 Apr 2018 05:35) (57 - 71)  BP: 122/70 (12 Apr 2018 05:35) (122/70 - 127/73)  BP(mean): --  RR: 17 (12 Apr 2018 05:35) (16 - 17)  SpO2: 100% (12 Apr 2018 05:35) (99% - 100%)      REVIEW OF SYSTEMS: denies fever, chills, SOB, palpitations, chest pain, abdominal pain, nausea, vomiting, diarrhea, constipation, dizziness    MEDICATIONS  (STANDING):  enoxaparin Injectable 40 milliGRAM(s) SubCutaneous daily  iron sucrose IVPB 200 milliGRAM(s) IV Intermittent every 24 hours  pantoprazole    Tablet 40 milliGRAM(s) Oral before breakfast  potassium phosphate IVPB 15 milliMole(s) IV Intermittent once          LABS:                                              8.6    5.9   )-----------( 355      ( 12 Apr 2018 07:18 )             28.6       CARDIAC MARKERS ( 10 Apr 2018 19:34 )  <0.015 ng/mL / x     / 77 U/L / x     / <1.0 ng/mL        04-12    143  |  109<H>  |  12  ----------------------------<  69<L>  3.7   |  28  |  0.65    Ca    7.6<L>      12 Apr 2018 07:18  Phos  2.2     04-12  Mg     1.8     04-12    TPro  5.5<L>  /  Alb  2.2<L>  /  TBili  0.2  /  DBili  x   /  AST  24  /  ALT  27  /  AlkPhos  63  04-10
[   ] ICU                                          [   ] CCU                                      [  X ] Medical Floor    Patient is comfortable. No new complaints reported, No abdominal pain, N/V, hematemesis, hematochezia, melena, fever, chills, chest pain, SOB, cough or diarrhea reported.    VITALS  Vital Signs Last 24 Hrs  T(C): 37.1 (13 Apr 2018 14:18), Max: 37.1 (13 Apr 2018 14:18)  T(F): 98.7 (13 Apr 2018 14:18), Max: 98.7 (13 Apr 2018 14:18)  HR: 75 (13 Apr 2018 14:18) (57 - 75)  BP: 126/89 (13 Apr 2018 14:18) (117/73 - 131/78)  RR: 18 (13 Apr 2018 14:18) (17 - 18)  SpO2: 97% (13 Apr 2018 14:18) (97% - 100%)       MEDICATIONS  (STANDING):  iron sucrose IVPB 200 milliGRAM(s) IV Intermittent every 24 hours  pantoprazole    Tablet 40 milliGRAM(s) Oral before breakfast    MEDICATIONS  (PRN):                            8.6    5.3   )-----------( 274      ( 13 Apr 2018 07:39 )             27.4       04-13    141  |  108  |  8   ----------------------------<  67<L>  4.1   |  25  |  0.68    Ca    7.6<L>      13 Apr 2018 07:39  Phos  2.7     04-13  Mg     1.8     04-13
feel better today  had transfusion and on venofer  no sob  arm is not swollen  on lovenox 40  EGD is neg  if no bleeding is found, can resume eliquis
feel ok  no sob or pain  no source of bleeding found so far  but she has DVT ar arm, that requires treatment  had venofer  needs at least lovenox 40  ?eliquis 2.5 bid  for capsule
PGY-3 NOTE:    Patient is a 62 year-old female with PMH of HTN, recurrent SBOs (s/p adhesion lysis on 2/13/18 by Dr. Guerrero), s/p TPN via PICC line in LUE 2/16-2/23, bilateral lower extremity swelling, anemia, left arm DVT (on lovenox) who was sent to the hospital for anemia by her PCP.       INTERVAL HPI/OVERNIGHT EVENTS: No events overnight. Patient seen and examined at bedside. Denies complaints.       T(C): 36.7 (04-11-18 @ 04:30), Max: 36.9 (04-10-18 @ 21:04)  HR: 62 (04-11-18 @ 04:30) (62 - 104)  BP: 116/67 (04-11-18 @ 04:30) (102/65 - 116/67)  RR: 18 (04-11-18 @ 04:30) (18 - 19)  SpO2: 97% (04-11-18 @ 04:30) (97% - 100%)  Wt(kg): --  I&O's Summary      REVIEW OF SYSTEMS: denies fever, chills, SOB, palpitations, chest pain, abdominal pain, nausea, vomiting, diarrhea, constipation, dizziness    MEDICATIONS  (STANDING):  ferrous    sulfate 325 milliGRAM(s) Oral daily  pantoprazole    Tablet 40 milliGRAM(s) Oral before breakfast      PHYSICAL EXAM:  GENERAL: NAD, well-groomed, well-developed  HEAD:  Atraumatic, Normocephalic  EYES: PERRLA  ENMT: moist mucus membranes   NECK: Supple, No JVD, Normal thyroid  NERVOUS SYSTEM:  Alert & Oriented X3  CHEST/LUNG: No rales, rhonchi, wheezing, or rubs  HEART: Regular rate and rhythm; No murmurs, rubs, or gallops  ABDOMEN: Soft, Nontender, mildly distended; Bowel sounds present  EXTREMITIES: No clubbing, cyanosis, bilateral leg 2+ pitting edema  LYMPH: No lymphadenopathy noted  SKIN: midline abdominal scar, clean.      LABS:                        7.7    6.4   )-----------( 358      ( 11 Apr 2018 07:33 )             26.2     04-11    141  |  108  |  14  ----------------------------<  68<L>  4.1   |  27  |  0.71    Ca    7.7<L>      11 Apr 2018 07:33  Phos  3.0     04-11  Mg     1.7     04-11    TPro  5.5<L>  /  Alb  2.2<L>  /  TBili  0.2  /  DBili  x   /  AST  24  /  ALT  27  /  AlkPhos  63  04-10    PT/INR - ( 11 Apr 2018 07:33 )   PT: 13.1 sec;   INR: 1.20 ratio         PTT - ( 11 Apr 2018 07:33 )  PTT:30.9 sec    CAPILLARY BLOOD GLUCOSE      POCT Blood Glucose.: 103 mg/dL (10 Apr 2018 16:23)

## 2018-04-13 NOTE — DISCHARGE NOTE ADULT - PATIENT PORTAL LINK FT
You can access the [facility] Patient Portal, offered by [facility], by registering with the following website: [email or web address]

## 2018-04-13 NOTE — DISCHARGE NOTE ADULT - MEDICATION SUMMARY - MEDICATIONS TO STOP TAKING
I will STOP taking the medications listed below when I get home from the hospital:    famotidine 40 mg oral tablet  -- 1 tab(s) by mouth once a day (at bedtime)    Eliquis 5 mg oral tablet  -- DVT TREATMENT DOSING  FOR FIRST 7 DAYS, TAKE 10MG TWICE DAILY   THEN START 5MG TWICE DAILY.      -- Check with your doctor before becoming pregnant.  It is very important that you take or use this exactly as directed.  Do not skip doses or discontinue unless directed by your doctor.  Obtain medical advice before taking any non-prescription drugs as some may affect the action of this medication.

## 2018-04-13 NOTE — DISCHARGE NOTE ADULT - PLAN OF CARE
to determine cause and treat Please take pantoprazole and iron supplement as prescribed. Follow up with Dr. Ross and Dr. Chatterjee for biopsy results within 5 days of discharge. STOP taking eliquis. Begin taking xarelto (rivaroxaban) as prescribed. Follow up with Dr. Ross within 5 days of discharge.

## 2018-04-16 LAB — SURGICAL PATHOLOGY FINAL REPORT - CH: SIGNIFICANT CHANGE UP

## 2018-04-17 ENCOUNTER — APPOINTMENT (OUTPATIENT)
Dept: SURGERY | Facility: CLINIC | Age: 63
End: 2018-04-17
Payer: MEDICAID

## 2018-04-17 DIAGNOSIS — I82.622 ACUTE EMBOLISM AND THROMBOSIS OF DEEP VEINS OF LEFT UPPER EXTREMITY: ICD-10-CM

## 2018-04-17 PROCEDURE — 99024 POSTOP FOLLOW-UP VISIT: CPT

## 2018-05-22 ENCOUNTER — APPOINTMENT (OUTPATIENT)
Dept: SURGERY | Facility: CLINIC | Age: 63
End: 2018-05-22
Payer: MEDICAID

## 2018-05-22 DIAGNOSIS — Z87.19 PERSONAL HISTORY OF OTHER DISEASES OF THE DIGESTIVE SYSTEM: ICD-10-CM

## 2018-05-22 DIAGNOSIS — L91.0 HYPERTROPHIC SCAR: ICD-10-CM

## 2018-05-22 PROCEDURE — 99213 OFFICE O/P EST LOW 20 MIN: CPT

## 2018-05-22 RX ORDER — GEL-MATRIX PAD DRESS, SILICONE 2"X5.5"
PAD TOPICAL
Qty: 1 | Refills: 0 | Status: ACTIVE | COMMUNITY
Start: 2018-05-22 | End: 1900-01-01

## 2018-05-23 PROCEDURE — 80048 BASIC METABOLIC PNL TOTAL CA: CPT

## 2018-05-23 PROCEDURE — 82607 VITAMIN B-12: CPT

## 2018-05-23 PROCEDURE — 93005 ELECTROCARDIOGRAM TRACING: CPT

## 2018-05-23 PROCEDURE — 85027 COMPLETE CBC AUTOMATED: CPT

## 2018-05-23 PROCEDURE — 36430 TRANSFUSION BLD/BLD COMPNT: CPT

## 2018-05-23 PROCEDURE — 88312 SPECIAL STAINS GROUP 1: CPT

## 2018-05-23 PROCEDURE — 82550 ASSAY OF CK (CPK): CPT

## 2018-05-23 PROCEDURE — 84100 ASSAY OF PHOSPHORUS: CPT

## 2018-05-23 PROCEDURE — 86901 BLOOD TYPING SEROLOGIC RH(D): CPT

## 2018-05-23 PROCEDURE — 85045 AUTOMATED RETICULOCYTE COUNT: CPT

## 2018-05-23 PROCEDURE — 82962 GLUCOSE BLOOD TEST: CPT

## 2018-05-23 PROCEDURE — 83880 ASSAY OF NATRIURETIC PEPTIDE: CPT

## 2018-05-23 PROCEDURE — 86850 RBC ANTIBODY SCREEN: CPT

## 2018-05-23 PROCEDURE — 85730 THROMBOPLASTIN TIME PARTIAL: CPT

## 2018-05-23 PROCEDURE — 80061 LIPID PANEL: CPT

## 2018-05-23 PROCEDURE — 83735 ASSAY OF MAGNESIUM: CPT

## 2018-05-23 PROCEDURE — 99285 EMERGENCY DEPT VISIT HI MDM: CPT | Mod: 25

## 2018-05-23 PROCEDURE — 82009 KETONE BODYS QUAL: CPT

## 2018-05-23 PROCEDURE — 80053 COMPREHEN METABOLIC PANEL: CPT

## 2018-05-23 PROCEDURE — 71045 X-RAY EXAM CHEST 1 VIEW: CPT

## 2018-05-23 PROCEDURE — 83615 LACTATE (LD) (LDH) ENZYME: CPT

## 2018-05-23 PROCEDURE — 85610 PROTHROMBIN TIME: CPT

## 2018-05-23 PROCEDURE — 86900 BLOOD TYPING SEROLOGIC ABO: CPT

## 2018-05-23 PROCEDURE — 84466 ASSAY OF TRANSFERRIN: CPT

## 2018-05-23 PROCEDURE — 84484 ASSAY OF TROPONIN QUANT: CPT

## 2018-05-23 PROCEDURE — 93971 EXTREMITY STUDY: CPT

## 2018-05-23 PROCEDURE — 82272 OCCULT BLD FECES 1-3 TESTS: CPT

## 2018-05-23 PROCEDURE — 86923 COMPATIBILITY TEST ELECTRIC: CPT

## 2018-05-23 PROCEDURE — 82746 ASSAY OF FOLIC ACID SERUM: CPT

## 2018-05-23 PROCEDURE — G0378: CPT

## 2018-05-23 PROCEDURE — 82306 VITAMIN D 25 HYDROXY: CPT

## 2018-05-23 PROCEDURE — 36415 COLL VENOUS BLD VENIPUNCTURE: CPT

## 2018-05-23 PROCEDURE — 82728 ASSAY OF FERRITIN: CPT

## 2018-05-23 PROCEDURE — 80074 ACUTE HEPATITIS PANEL: CPT

## 2018-05-23 PROCEDURE — 93306 TTE W/DOPPLER COMPLETE: CPT

## 2018-05-23 PROCEDURE — 96372 THER/PROPH/DIAG INJ SC/IM: CPT

## 2018-05-23 PROCEDURE — 82553 CREATINE MB FRACTION: CPT

## 2018-05-23 PROCEDURE — 83036 HEMOGLOBIN GLYCOSYLATED A1C: CPT

## 2018-05-23 PROCEDURE — 83010 ASSAY OF HAPTOGLOBIN QUANT: CPT

## 2018-05-23 PROCEDURE — P9040: CPT

## 2018-05-23 PROCEDURE — 88305 TISSUE EXAM BY PATHOLOGIST: CPT

## 2018-05-23 PROCEDURE — 84443 ASSAY THYROID STIM HORMONE: CPT

## 2018-05-23 PROCEDURE — 83550 IRON BINDING TEST: CPT

## 2018-10-03 ENCOUNTER — INPATIENT (INPATIENT)
Facility: HOSPITAL | Age: 63
LOS: 4 days | Discharge: HOME | DRG: 812 | End: 2018-10-08
Attending: INTERNAL MEDICINE | Admitting: INTERNAL MEDICINE
Payer: MEDICAID

## 2018-10-03 VITALS — HEIGHT: 66 IN

## 2018-10-03 DIAGNOSIS — R60.0 LOCALIZED EDEMA: ICD-10-CM

## 2018-10-03 DIAGNOSIS — D64.9 ANEMIA, UNSPECIFIED: ICD-10-CM

## 2018-10-03 DIAGNOSIS — Z87.19 PERSONAL HISTORY OF OTHER DISEASES OF THE DIGESTIVE SYSTEM: ICD-10-CM

## 2018-10-03 DIAGNOSIS — K29.70 GASTRITIS, UNSPECIFIED, WITHOUT BLEEDING: ICD-10-CM

## 2018-10-03 DIAGNOSIS — Z29.9 ENCOUNTER FOR PROPHYLACTIC MEASURES, UNSPECIFIED: ICD-10-CM

## 2018-10-03 DIAGNOSIS — Z98.890 OTHER SPECIFIED POSTPROCEDURAL STATES: Chronic | ICD-10-CM

## 2018-10-03 DIAGNOSIS — I82.409 ACUTE EMBOLISM AND THROMBOSIS OF UNSPECIFIED DEEP VEINS OF UNSPECIFIED LOWER EXTREMITY: ICD-10-CM

## 2018-10-03 LAB
ALBUMIN SERPL ELPH-MCNC: 2.9 G/DL — LOW (ref 3.5–5)
ALP SERPL-CCNC: 61 U/L — SIGNIFICANT CHANGE UP (ref 40–120)
ALT FLD-CCNC: 35 U/L DA — SIGNIFICANT CHANGE UP (ref 10–60)
ANION GAP SERPL CALC-SCNC: 8 MMOL/L — SIGNIFICANT CHANGE UP (ref 5–17)
APTT BLD: 33 SEC — SIGNIFICANT CHANGE UP (ref 27.5–37.4)
AST SERPL-CCNC: 48 U/L — HIGH (ref 10–40)
BASOPHILS # BLD AUTO: 0.1 K/UL — SIGNIFICANT CHANGE UP (ref 0–0.2)
BASOPHILS NFR BLD AUTO: 1.4 % — SIGNIFICANT CHANGE UP (ref 0–2)
BILIRUB SERPL-MCNC: 0.3 MG/DL — SIGNIFICANT CHANGE UP (ref 0.2–1.2)
BUN SERPL-MCNC: 22 MG/DL — HIGH (ref 7–18)
CALCIUM SERPL-MCNC: 8.6 MG/DL — SIGNIFICANT CHANGE UP (ref 8.4–10.5)
CHLORIDE SERPL-SCNC: 108 MMOL/L — SIGNIFICANT CHANGE UP (ref 96–108)
CO2 SERPL-SCNC: 24 MMOL/L — SIGNIFICANT CHANGE UP (ref 22–31)
CREAT SERPL-MCNC: 0.81 MG/DL — SIGNIFICANT CHANGE UP (ref 0.5–1.3)
EOSINOPHIL # BLD AUTO: 0 K/UL — SIGNIFICANT CHANGE UP (ref 0–0.5)
EOSINOPHIL NFR BLD AUTO: 0.4 % — SIGNIFICANT CHANGE UP (ref 0–6)
GLUCOSE SERPL-MCNC: 74 MG/DL — SIGNIFICANT CHANGE UP (ref 70–99)
HCT VFR BLD CALC: 22.2 % — LOW (ref 34.5–45)
HGB BLD-MCNC: 6.3 G/DL — CRITICAL LOW (ref 11.5–15.5)
INR BLD: 1.13 RATIO — SIGNIFICANT CHANGE UP (ref 0.88–1.16)
IRON SATN MFR SERPL: 18 UG/DL — LOW (ref 40–150)
IRON SATN MFR SERPL: 5 % — LOW (ref 15–50)
LYMPHOCYTES # BLD AUTO: 2 K/UL — SIGNIFICANT CHANGE UP (ref 1–3.3)
LYMPHOCYTES # BLD AUTO: 36.5 % — SIGNIFICANT CHANGE UP (ref 13–44)
MCHC RBC-ENTMCNC: 24.2 PG — LOW (ref 27–34)
MCHC RBC-ENTMCNC: 28.2 GM/DL — LOW (ref 32–36)
MCV RBC AUTO: 85.7 FL — SIGNIFICANT CHANGE UP (ref 80–100)
MONOCYTES # BLD AUTO: 0.3 K/UL — SIGNIFICANT CHANGE UP (ref 0–0.9)
MONOCYTES NFR BLD AUTO: 5.6 % — SIGNIFICANT CHANGE UP (ref 2–14)
NEUTROPHILS # BLD AUTO: 3.1 K/UL — SIGNIFICANT CHANGE UP (ref 1.8–7.4)
NEUTROPHILS NFR BLD AUTO: 56.1 % — SIGNIFICANT CHANGE UP (ref 43–77)
OB PNL STL: POSITIVE
PLATELET # BLD AUTO: 483 K/UL — HIGH (ref 150–400)
POTASSIUM SERPL-MCNC: 4.9 MMOL/L — SIGNIFICANT CHANGE UP (ref 3.5–5.3)
POTASSIUM SERPL-SCNC: 4.9 MMOL/L — SIGNIFICANT CHANGE UP (ref 3.5–5.3)
PROT SERPL-MCNC: 6.8 G/DL — SIGNIFICANT CHANGE UP (ref 6–8.3)
PROTHROM AB SERPL-ACNC: 12.4 SEC — SIGNIFICANT CHANGE UP (ref 9.8–12.7)
RBC # BLD: 2.59 M/UL — LOW (ref 3.8–5.2)
RBC # FLD: 18.8 % — HIGH (ref 10.3–14.5)
SODIUM SERPL-SCNC: 140 MMOL/L — SIGNIFICANT CHANGE UP (ref 135–145)
TIBC SERPL-MCNC: 381 UG/DL — SIGNIFICANT CHANGE UP (ref 250–450)
TROPONIN I SERPL-MCNC: <0.015 NG/ML — SIGNIFICANT CHANGE UP (ref 0–0.04)
UIBC SERPL-MCNC: 363 UG/DL — SIGNIFICANT CHANGE UP (ref 110–370)
WBC # BLD: 5.5 K/UL — SIGNIFICANT CHANGE UP (ref 3.8–10.5)
WBC # FLD AUTO: 5.5 K/UL — SIGNIFICANT CHANGE UP (ref 3.8–10.5)

## 2018-10-03 PROCEDURE — 99285 EMERGENCY DEPT VISIT HI MDM: CPT

## 2018-10-03 PROCEDURE — 71045 X-RAY EXAM CHEST 1 VIEW: CPT | Mod: 26

## 2018-10-03 RX ORDER — IRON SUCROSE 20 MG/ML
200 INJECTION, SOLUTION INTRAVENOUS EVERY 24 HOURS
Qty: 0 | Refills: 0 | Status: DISCONTINUED | OUTPATIENT
Start: 2018-10-03 | End: 2018-10-03

## 2018-10-03 RX ORDER — SODIUM CHLORIDE 9 MG/ML
1000 INJECTION, SOLUTION INTRAVENOUS
Qty: 0 | Refills: 0 | Status: DISCONTINUED | OUTPATIENT
Start: 2018-10-03 | End: 2018-10-08

## 2018-10-03 RX ORDER — FERROUS SULFATE 325(65) MG
325 TABLET ORAL DAILY
Qty: 0 | Refills: 0 | Status: DISCONTINUED | OUTPATIENT
Start: 2018-10-03 | End: 2018-10-05

## 2018-10-03 RX ORDER — SODIUM CHLORIDE 9 MG/ML
1000 INJECTION INTRAMUSCULAR; INTRAVENOUS; SUBCUTANEOUS ONCE
Qty: 0 | Refills: 0 | Status: COMPLETED | OUTPATIENT
Start: 2018-10-03 | End: 2018-10-03

## 2018-10-03 RX ORDER — PANTOPRAZOLE SODIUM 20 MG/1
40 TABLET, DELAYED RELEASE ORAL
Qty: 0 | Refills: 0 | Status: DISCONTINUED | OUTPATIENT
Start: 2018-10-03 | End: 2018-10-05

## 2018-10-03 RX ORDER — IRON SUCROSE 20 MG/ML
200 INJECTION, SOLUTION INTRAVENOUS EVERY 24 HOURS
Qty: 0 | Refills: 0 | Status: COMPLETED | OUTPATIENT
Start: 2018-10-04 | End: 2018-10-05

## 2018-10-03 RX ORDER — LISINOPRIL 2.5 MG/1
10 TABLET ORAL
Qty: 0 | Refills: 0 | COMMUNITY

## 2018-10-03 RX ORDER — AMLODIPINE BESYLATE 2.5 MG/1
0.5 TABLET ORAL
Qty: 0 | Refills: 0 | COMMUNITY

## 2018-10-03 RX ORDER — PANTOPRAZOLE SODIUM 20 MG/1
40 TABLET, DELAYED RELEASE ORAL
Qty: 0 | Refills: 0 | Status: DISCONTINUED | OUTPATIENT
Start: 2018-10-03 | End: 2018-10-03

## 2018-10-03 RX ADMIN — SODIUM CHLORIDE 2000 MILLILITER(S): 9 INJECTION INTRAMUSCULAR; INTRAVENOUS; SUBCUTANEOUS at 15:44

## 2018-10-03 RX ADMIN — PANTOPRAZOLE SODIUM 40 MILLIGRAM(S): 20 TABLET, DELAYED RELEASE ORAL at 18:25

## 2018-10-03 NOTE — ED ADULT NURSE REASSESSMENT NOTE - NS ED NURSE REASSESS COMMENT FT1
endorsed pt to [facility] in holding area in stable condition for continuation of care. pt a&ox3, admitted for anemia and generalized weakness. 18G left arm. denies headache or dizziness. endorsed pt to [facility] in holding area in stable condition for continuation of care. pt a&ox3, admitted for anemia and generalized weakness. 18G left arm. denies headache or dizziness. endorsed pt to [facility] in holding area in stable condition for continuation of care. pt a&ox3, admitted for anemia and generalized weakness. 18G left arm. denies headache or dizziness.

## 2018-10-03 NOTE — H&P ADULT - NSHPPHYSICALEXAM_GEN_ALL_CORE
.  GENERAL: Well developed, Thin lean [place] American female   HEENT:  Normocephalic/Atraumatic, Reactive light reflex, pale conjunctivae   NECK: Supple, no JVD  RESP: Symmetric movement of the chest, clear to auscultation bilaterally, no wheeze  CVS: S1 and S2 audible, no murmur, rubs or gallops noted  GI: Normal active bowel sounds present, abdomen soft, non tender, non distended, without scar marks  EXTREMITIES:  1+ edema, no clubbing, cyanosis  MSK: 5/5 strength bilateral upper and lower extremities, intact sensations to light & crude touch  PSYCH: Normal mood, normal affect observed    NEURO: alert and oriented x 3 .  GENERAL: Well developed, Thin lean [place] American female   HEENT:  Normocephalic/Atraumatic, Reactive light reflex, pale conjunctivae   NECK: Supple, no JVD  RESP: Symmetric movement of the chest, clear to auscultation bilaterally, no wheeze  CVS: S1 and S2 audible, no murmur, rubs or gallops noted  GI: Normal active bowel sounds present, abdomen soft, Distended with central scar tucker  EXTREMITIES:  1+ edema, no clubbing, cyanosis  MSK: 5/5 strength bilateral upper and lower extremities, intact sensations to light & crude touch  PSYCH: Normal mood, normal affect observed    NEURO: alert and oriented x 3

## 2018-10-03 NOTE — ED PROVIDER NOTE - PHYSICAL EXAMINATION
Afebrile, hemodynamically stable, saturating well  NAD, well appearing  Head NCAT  EOMI grossly, anicteric  MMM  RRR, nml S1/S2, no m/r/g  Lungs CTAB, no w/r/r  Abd soft, NT, ND, nml BS, no rebound or guarding  AAO, CN's 3-12 grossly intact  SILVA spontaneously, no leg cyanosis or edema  Skin warm, dry, no rashes or hives

## 2018-10-03 NOTE — ED PROVIDER NOTE - OBJECTIVE STATEMENT
62 y/o f w/ hx of DVT, SBO, HTN and shx of intestinal surgery presents b/c she received a call from Dr. Ross who said her red cell blood cell count was too low (2.4), as per pt. Reports very infrequent dizziness and occasional weakness to the lower extremities. States that she has received transfusion in the past. Has had negative colonoscopies and endoscopies. Pt denies bleeding from anywhere, abd pain, CP, SOB, dizziness, and any other complaints. Non-smoker. Pt is on Xarelto, furosemide, ranitidine. NKDA. 62 y/o f w/ hx of DVT, SBO, HTN and shx of intestinal surgery presents b/c she received a call from Dr. Ross who said her red cell blood cell count was too low, as per pt. Reports very infrequent dizziness and occasional weakness to the lower extremities, uncertain if this is even new currently. States that she has received transfusion in the past. Has had negative colonoscopies and endoscopies. Pt denies bleeding from anywhere, abd pain, CP, SOB, and any other complaints. Non-smoker. Pt is on Xarelto, furosemide, ranitidine. NKDA.

## 2018-10-03 NOTE — ED STATDOCS - OBJECTIVE STATEMENT
red blood cells too low, Dr. Morrell advised patient to presents to the ED, history of low blood levels. Patient denies blood in stool. Patient takes iron pills, on Xarelto for history of blood clot in the left arm in March 2018. Endorses weakness in the left leg. Blood test done yesterday. Patient concerned about Vitam b12 deficiency. Telemedicine assessment was conducted (using real time 2 way audio-video technology) by Dr. Jhonathan Boyer located at [address] [place] [address]  +++++++++++++++++++++++++++++++++++++++++++++++++++  History and Plan: red blood cells too low, Dr. Morrell advised patient to presents to the ED, history of low blood levels. Patient denies blood in stool. Patient takes iron pills, on Xarelto for history of blood clot in the left arm in March 2018. Endorses weakness in the left leg. Blood test done yesterday. Patient concerned about Vitam b12 deficiency. Telemedicine assessment was conducted (using real time 2 way audio-video technology) by Dr. Jhonathan Boyer located at [address] [place] [address]  +++++++++++++++++++++++++++++++++++++++++++++++++++  History and Plan:    well appearing  will check labs,   Patient seen by me in intake for initial assessment and ordering. Physician on site to follow results and further evaluate and treat patient.

## 2018-10-03 NOTE — PATIENT PROFILE ADULT. - AS SC BRADEN ACTIVITY
1. What PRN did patient receive? Atarax/Vistaril and Other Oxycodone     2. What was the patient doing that led to the PRN medication? Anxiety and Pain    3. Did they require R/S? NO    4. Side effects to PRN medication? Sedation    5. After 1 Hour, patient appeared: JACINTA          (3) walks occasionally

## 2018-10-03 NOTE — H&P ADULT - PROBLEM SELECTOR PLAN 4
Secondary to adhesions  Underwent Lysis by Dr Guerrero in Feb 2018  No constipation or increased distention

## 2018-10-03 NOTE — ED ADULT NURSE NOTE - NSIMPLEMENTINTERV_GEN_ALL_ED
Implemented All Universal Safety Interventions:  [place] to call system. Call bell, personal items and telephone within reach. Instruct patient to call for assistance. Room bathroom lighting operational. Non-slip footwear when patient is off stretcher. Physically safe environment: no spills, clutter or unnecessary equipment. Stretcher in lowest position, wheels locked, appropriate side rails in place.

## 2018-10-03 NOTE — H&P ADULT - ASSESSMENT
63 Female from home, walks independently, lives with daughter, with PMH of Recurrent SBO's, DVT LUE secondary to PICC line (On Xarelto since March 2018), Chronic B/L leg swelling, Normocytic Anemia (baseline Hb 7.5-8.5), was sent from Dr. Ross as her Hb was low. Admitted for Normocytic anemia.

## 2018-10-03 NOTE — H&P ADULT - PROBLEM SELECTOR PLAN 3
Normal Echo with 55% EF  Chronic LE edema 1+ pitting  CXR: Clear  Takes Lasix 40 bid at home  Holding due to hypotension

## 2018-10-03 NOTE — H&P ADULT - HISTORY OF PRESENT ILLNESS
63 Female from home, walks independently, no HHA, lives with daughter, with PMH of HTN, recurrent SBO's, DVT LUE secondary to PICC line (On Xarelto), Chronic B/L leg swelling, Normocytic Anemia (baseline Hb 7.5-8.5), was sent from Dr. Ross as her Hb was low, need for transfusion and monitoring H/H. 63 Female from home, walks independently, lives with daughter, with PMH of Recurrent SBO's, DVT LUE secondary to PICC line (On Xarelto since March 2018), Chronic B/L leg swelling, Normocytic Anemia (baseline Hb 7.5-8.5), was sent from Dr. Ross as her Hb was low. Pt has felt generalized weakness more commonly in her legs. She has also felt some cramps in her hands and fingers. Pt was also admitted in April for low H/H and underwent EGD and Colonoscopy by Dr Chatterjee which was negative for acute bleeding source. She was restarted on her oral anticoagulation for DVT and was asked to follow up for outpatient Capsule EGD which she hasn't been able to get. Pt denies chest pain, shortness of breath, abdominal distention and constipation.     ED Course: Pt was hypotensive to 89/57 without tachycardia. Hb was low to 6.3. Cardiac enzymes were normal. EKG showed NSR @ 68 bpm.  Anemia panel showed Iron deficiency. Pt was given a unit of blood. 63 Female from home, walks independently, lives with daughter, with PMH of Recurrent SBO's, DVT LUE secondary to PICC line (On Xarelto since March 2018), Chronic B/L leg swelling, Normocytic Anemia (baseline Hb 7.5-8.5), was sent from Dr. Ross as her Hb was low. Pt has felt generalized weakness more commonly in her legs. She has also felt some cramps in her hands and fingers. Pt was also admitted in April for low H/H and underwent EGD and Colonoscopy by Dr Chatterjee which was negative for acute bleeding source. She was restarted on her oral anticoagulation for DVT and was asked to follow up for outpatient Capsule EGD which she hasn't been able to get. Pt denies chest pain, shortness of breath, abdominal distention and constipation.     ED Course: Pt was hypotensive to 89/57 without tachycardia. Hb was low to 6.3. Cardiac enzymes were normal. EKG showed NSR @ 68 bpm.  Anemia panel showed Iron deficiency. Pt was given a unit of blood and 1 Liter Bolus.

## 2018-10-03 NOTE — H&P ADULT - PROBLEM SELECTOR PLAN 1
Acute on Chronic Normocytic anemia  Normal Prior EGD and Colonoscopy in April   Pt failed to get o/p Capsule EGD  FOBT +ve (Normal Brown stool on exam) Likely UGIB  Ordered 1 PRBC  F/u Anemia panel and LDH, reticulocyte count  2 large peripheral IV lines  Clear liquid diet  Monitor CBC q8 Acute on Chronic Normocytic anemia  Likely Malabsorption due to recurrent Surgeries 2/2 SBO   Normal Prior EGD and Colonoscopy in April   Pt failed to get o/p Capsule EGD  FOBT +ve (Normal Brown stool on exam) Likely UGIB  Ordered 1 PRBC and IV Venofer for 2 days  F/u Anemia panel and LDH, reticulocyte count  2 large peripheral IV lines  Clear liquid diet  Monitor CBC q8 Acute on Chronic Normocytic anemia  Likely Fe Malabsorption due to recurrent Surgeries 2/2 SBO   Normal Prior EGD and Colonoscopy in April   Pt failed to get o/p Capsule EGD  FOBT +ve (Normal Brown stool on exam) Likely UGIB  Ordered 1 PRBC and IV Venofer for 2 days  F/u Anemia panel and LDH, reticulocyte count  2 large peripheral IV lines  Clear liquid diet  Monitor CBC q8

## 2018-10-03 NOTE — H&P ADULT - PROBLEM SELECTOR PLAN 2
SHIVAM DVT, Pt takes Xarelto 15 at home for DVT that happened in March secondary to PICC line  Pt has completed >6 months of Anticoagulation  Will likely doesn't need any more at discharge   Holding A/c due to anemia

## 2018-10-03 NOTE — ED PROVIDER NOTE - MEDICAL DECISION MAKING DETAILS
Anemia, past history, no acute bleeding. Well appearing, hemodynamically stable, asymptomatic currently. Will transfuse 1u. Admitted for further monitoring, w/u, and care.

## 2018-10-03 NOTE — ED ADULT NURSE REASSESSMENT NOTE - NS ED NURSE REASSESS COMMENT FT1
assumed care of pt to holding area from [facility] in stable condition. pt a&ox3, here for anemia. 18G left forearm. clear liquid diet tray given to pt.

## 2018-10-04 LAB
ALBUMIN SERPL ELPH-MCNC: 2.5 G/DL — LOW (ref 3.5–5)
ALP SERPL-CCNC: 59 U/L — SIGNIFICANT CHANGE UP (ref 40–120)
ALT FLD-CCNC: 28 U/L DA — SIGNIFICANT CHANGE UP (ref 10–60)
ANION GAP SERPL CALC-SCNC: 5 MMOL/L — SIGNIFICANT CHANGE UP (ref 5–17)
AST SERPL-CCNC: 15 U/L — SIGNIFICANT CHANGE UP (ref 10–40)
BASOPHILS # BLD AUTO: 0.1 K/UL — SIGNIFICANT CHANGE UP (ref 0–0.2)
BASOPHILS NFR BLD AUTO: 1.6 % — SIGNIFICANT CHANGE UP (ref 0–2)
BILIRUB SERPL-MCNC: 0.5 MG/DL — SIGNIFICANT CHANGE UP (ref 0.2–1.2)
BUN SERPL-MCNC: 17 MG/DL — SIGNIFICANT CHANGE UP (ref 7–18)
CALCIUM SERPL-MCNC: 8.4 MG/DL — SIGNIFICANT CHANGE UP (ref 8.4–10.5)
CHLORIDE SERPL-SCNC: 108 MMOL/L — SIGNIFICANT CHANGE UP (ref 96–108)
CHOLEST SERPL-MCNC: 72 MG/DL — SIGNIFICANT CHANGE UP (ref 10–199)
CO2 SERPL-SCNC: 26 MMOL/L — SIGNIFICANT CHANGE UP (ref 22–31)
CREAT SERPL-MCNC: 0.74 MG/DL — SIGNIFICANT CHANGE UP (ref 0.5–1.3)
EOSINOPHIL # BLD AUTO: 0.1 K/UL — SIGNIFICANT CHANGE UP (ref 0–0.5)
EOSINOPHIL NFR BLD AUTO: 1.4 % — SIGNIFICANT CHANGE UP (ref 0–6)
FOLATE SERPL-MCNC: >20 NG/ML — SIGNIFICANT CHANGE UP
GLUCOSE SERPL-MCNC: 119 MG/DL — HIGH (ref 70–99)
HBA1C BLD-MCNC: 4.8 % — SIGNIFICANT CHANGE UP (ref 4–5.6)
HCT VFR BLD CALC: 24.9 % — LOW (ref 34.5–45)
HCT VFR BLD CALC: 28.3 % — LOW (ref 34.5–45)
HDLC SERPL-MCNC: 44 MG/DL — LOW
HGB BLD-MCNC: 7.6 G/DL — LOW (ref 11.5–15.5)
HGB BLD-MCNC: 8.3 G/DL — LOW (ref 11.5–15.5)
LDH SERPL L TO P-CCNC: 163 U/L — SIGNIFICANT CHANGE UP (ref 120–225)
LIPID PNL WITH DIRECT LDL SERPL: 21 MG/DL — SIGNIFICANT CHANGE UP
LYMPHOCYTES # BLD AUTO: 1.6 K/UL — SIGNIFICANT CHANGE UP (ref 1–3.3)
LYMPHOCYTES # BLD AUTO: 31.2 % — SIGNIFICANT CHANGE UP (ref 13–44)
MAGNESIUM SERPL-MCNC: 1.9 MG/DL — SIGNIFICANT CHANGE UP (ref 1.6–2.6)
MCHC RBC-ENTMCNC: 25.4 PG — LOW (ref 27–34)
MCHC RBC-ENTMCNC: 25.6 PG — LOW (ref 27–34)
MCHC RBC-ENTMCNC: 29.2 GM/DL — LOW (ref 32–36)
MCHC RBC-ENTMCNC: 30.5 GM/DL — LOW (ref 32–36)
MCV RBC AUTO: 83.8 FL — SIGNIFICANT CHANGE UP (ref 80–100)
MCV RBC AUTO: 86.9 FL — SIGNIFICANT CHANGE UP (ref 80–100)
MONOCYTES # BLD AUTO: 0.5 K/UL — SIGNIFICANT CHANGE UP (ref 0–0.9)
MONOCYTES NFR BLD AUTO: 8.9 % — SIGNIFICANT CHANGE UP (ref 2–14)
NEUTROPHILS # BLD AUTO: 3 K/UL — SIGNIFICANT CHANGE UP (ref 1.8–7.4)
NEUTROPHILS NFR BLD AUTO: 56.9 % — SIGNIFICANT CHANGE UP (ref 43–77)
PHOSPHATE SERPL-MCNC: 3 MG/DL — SIGNIFICANT CHANGE UP (ref 2.5–4.5)
PLATELET # BLD AUTO: 415 K/UL — HIGH (ref 150–400)
PLATELET # BLD AUTO: 462 K/UL — HIGH (ref 150–400)
POTASSIUM SERPL-MCNC: 4.3 MMOL/L — SIGNIFICANT CHANGE UP (ref 3.5–5.3)
POTASSIUM SERPL-SCNC: 4.3 MMOL/L — SIGNIFICANT CHANGE UP (ref 3.5–5.3)
PROT SERPL-MCNC: 5.8 G/DL — LOW (ref 6–8.3)
RBC # BLD: 2.97 M/UL — LOW (ref 3.8–5.2)
RBC # BLD: 3.07 M/UL — LOW (ref 3.8–5.2)
RBC # BLD: 3.25 M/UL — LOW (ref 3.8–5.2)
RBC # FLD: 17 % — HIGH (ref 10.3–14.5)
RBC # FLD: 17.1 % — HIGH (ref 10.3–14.5)
RETICS #: 94.6 K/UL — SIGNIFICANT CHANGE UP (ref 25–125)
RETICS/RBC NFR: 3.1 % — HIGH (ref 0.5–2.5)
SODIUM SERPL-SCNC: 139 MMOL/L — SIGNIFICANT CHANGE UP (ref 135–145)
TOTAL CHOLESTEROL/HDL RATIO MEASUREMENT: 1.6 RATIO — LOW (ref 3.3–7.1)
TRIGL SERPL-MCNC: 35 MG/DL — SIGNIFICANT CHANGE UP (ref 10–149)
TSH SERPL-MCNC: 1.3 UU/ML — SIGNIFICANT CHANGE UP (ref 0.34–4.82)
VIT B12 SERPL-MCNC: 343 PG/ML — SIGNIFICANT CHANGE UP (ref 232–1245)
VIT B12 SERPL-MCNC: 359 PG/ML — SIGNIFICANT CHANGE UP (ref 232–1245)
WBC # BLD: 5.3 K/UL — SIGNIFICANT CHANGE UP (ref 3.8–10.5)
WBC # BLD: 5.8 K/UL — SIGNIFICANT CHANGE UP (ref 3.8–10.5)
WBC # FLD AUTO: 5.3 K/UL — SIGNIFICANT CHANGE UP (ref 3.8–10.5)
WBC # FLD AUTO: 5.8 K/UL — SIGNIFICANT CHANGE UP (ref 3.8–10.5)

## 2018-10-04 RX ORDER — PREGABALIN 225 MG/1
1000 CAPSULE ORAL DAILY
Qty: 0 | Refills: 0 | Status: COMPLETED | OUTPATIENT
Start: 2018-10-04 | End: 2018-10-06

## 2018-10-04 RX ADMIN — Medication 325 MILLIGRAM(S): at 11:43

## 2018-10-04 RX ADMIN — IRON SUCROSE 110 MILLIGRAM(S): 20 INJECTION, SOLUTION INTRAVENOUS at 11:43

## 2018-10-04 RX ADMIN — PANTOPRAZOLE SODIUM 40 MILLIGRAM(S): 20 TABLET, DELAYED RELEASE ORAL at 17:05

## 2018-10-04 RX ADMIN — PREGABALIN 1000 MICROGRAM(S): 225 CAPSULE ORAL at 12:04

## 2018-10-04 NOTE — CONSULT NOTE ADULT - PROBLEM SELECTOR RECOMMENDATION 9
iron sat was low but no ferritin was done  stool G positive  she is getting venofer  she had 2 units of PRBC  for capsule as outpt  ?xarelto cause some GIB

## 2018-10-04 NOTE — PROGRESS NOTE ADULT - PROBLEM SELECTOR PLAN 1
Acute on Chronic Normocytic anemia  Likely Fe Malabsorption due to recurrent Surgeries 2/2 SBO   Normal Prior EGD and Colonoscopy in April   Pt failed to get o/p Capsule EGD  FOBT +ve (Normal Brown stool on exam) Likely UGIB  s/p 2 PRBC: repeat Hb: 8.3  MCV: 85.7, T. Fe: 18, TIBC: 381  2 large peripheral IV lines  REgular diet started   Will repeat CBC tomorrow  GI consult: Dr Chatterjee Acute on Chronic Normocytic anemia  Likely Fe Malabsorption due to recurrent Surgeries 2/2 SBO   Normal Prior EGD and Colonoscopy in April   Pt failed to get o/p Capsule EGD  FOBT +ve (Normal Brown stool on exam) Likely UGIB  s/p 2 PRBC: repeat Hb: 8.3  MCV: 85.7, T. Fe: 18, TIBC: 381, % saturation decreased. MCV: normal, RCW: increased: iron deficiency anemia most likely. follow up on ferritin result   2 large peripheral IV lines  REgular diet started   Will repeat CBC tomorrow  GI consult: Dr Chatterjee

## 2018-10-05 LAB
ALBUMIN SERPL ELPH-MCNC: 2.4 G/DL — LOW (ref 3.5–5)
ALP SERPL-CCNC: 58 U/L — SIGNIFICANT CHANGE UP (ref 40–120)
ALT FLD-CCNC: 28 U/L DA — SIGNIFICANT CHANGE UP (ref 10–60)
ANION GAP SERPL CALC-SCNC: 6 MMOL/L — SIGNIFICANT CHANGE UP (ref 5–17)
AST SERPL-CCNC: 14 U/L — SIGNIFICANT CHANGE UP (ref 10–40)
BASOPHILS # BLD AUTO: 0.1 K/UL — SIGNIFICANT CHANGE UP (ref 0–0.2)
BASOPHILS NFR BLD AUTO: 1.4 % — SIGNIFICANT CHANGE UP (ref 0–2)
BILIRUB SERPL-MCNC: 0.5 MG/DL — SIGNIFICANT CHANGE UP (ref 0.2–1.2)
BUN SERPL-MCNC: 17 MG/DL — SIGNIFICANT CHANGE UP (ref 7–18)
CALCIUM SERPL-MCNC: 8.3 MG/DL — LOW (ref 8.4–10.5)
CHLORIDE SERPL-SCNC: 109 MMOL/L — HIGH (ref 96–108)
CO2 SERPL-SCNC: 25 MMOL/L — SIGNIFICANT CHANGE UP (ref 22–31)
CREAT SERPL-MCNC: 0.82 MG/DL — SIGNIFICANT CHANGE UP (ref 0.5–1.3)
D DIMER BLD IA.RAPID-MCNC: 814 NG/ML DDU — HIGH
EOSINOPHIL # BLD AUTO: 0.1 K/UL — SIGNIFICANT CHANGE UP (ref 0–0.5)
EOSINOPHIL NFR BLD AUTO: 1.1 % — SIGNIFICANT CHANGE UP (ref 0–6)
FERRITIN SERPL-MCNC: 158 NG/ML — HIGH (ref 15–150)
GLUCOSE SERPL-MCNC: 72 MG/DL — SIGNIFICANT CHANGE UP (ref 70–99)
HCT VFR BLD CALC: 26.4 % — LOW (ref 34.5–45)
HGB BLD-MCNC: 8.1 G/DL — LOW (ref 11.5–15.5)
LYMPHOCYTES # BLD AUTO: 1.6 K/UL — SIGNIFICANT CHANGE UP (ref 1–3.3)
LYMPHOCYTES # BLD AUTO: 26.9 % — SIGNIFICANT CHANGE UP (ref 13–44)
MAGNESIUM SERPL-MCNC: 1.8 MG/DL — SIGNIFICANT CHANGE UP (ref 1.6–2.6)
MCHC RBC-ENTMCNC: 25.7 PG — LOW (ref 27–34)
MCHC RBC-ENTMCNC: 30.4 GM/DL — LOW (ref 32–36)
MCV RBC AUTO: 84.5 FL — SIGNIFICANT CHANGE UP (ref 80–100)
MONOCYTES # BLD AUTO: 0.6 K/UL — SIGNIFICANT CHANGE UP (ref 0–0.9)
MONOCYTES NFR BLD AUTO: 10.1 % — SIGNIFICANT CHANGE UP (ref 2–14)
NEUTROPHILS # BLD AUTO: 3.6 K/UL — SIGNIFICANT CHANGE UP (ref 1.8–7.4)
NEUTROPHILS NFR BLD AUTO: 60.5 % — SIGNIFICANT CHANGE UP (ref 43–77)
PHOSPHATE SERPL-MCNC: 2.9 MG/DL — SIGNIFICANT CHANGE UP (ref 2.5–4.5)
PLATELET # BLD AUTO: 430 K/UL — HIGH (ref 150–400)
POTASSIUM SERPL-MCNC: 4.6 MMOL/L — SIGNIFICANT CHANGE UP (ref 3.5–5.3)
POTASSIUM SERPL-SCNC: 4.6 MMOL/L — SIGNIFICANT CHANGE UP (ref 3.5–5.3)
PROT SERPL-MCNC: 5.5 G/DL — LOW (ref 6–8.3)
RBC # BLD: 3.13 M/UL — LOW (ref 3.8–5.2)
RBC # FLD: 17.1 % — HIGH (ref 10.3–14.5)
SODIUM SERPL-SCNC: 140 MMOL/L — SIGNIFICANT CHANGE UP (ref 135–145)
WBC # BLD: 5.9 K/UL — SIGNIFICANT CHANGE UP (ref 3.8–10.5)
WBC # FLD AUTO: 5.9 K/UL — SIGNIFICANT CHANGE UP (ref 3.8–10.5)

## 2018-10-05 PROCEDURE — 93971 EXTREMITY STUDY: CPT | Mod: 26,LT

## 2018-10-05 RX ORDER — PANTOPRAZOLE SODIUM 20 MG/1
40 TABLET, DELAYED RELEASE ORAL DAILY
Qty: 0 | Refills: 0 | Status: DISCONTINUED | OUTPATIENT
Start: 2018-10-05 | End: 2018-10-08

## 2018-10-05 RX ORDER — FERROUS SULFATE 325(65) MG
325 TABLET ORAL DAILY
Qty: 0 | Refills: 0 | Status: DISCONTINUED | OUTPATIENT
Start: 2018-10-07 | End: 2018-10-08

## 2018-10-05 RX ADMIN — PANTOPRAZOLE SODIUM 40 MILLIGRAM(S): 20 TABLET, DELAYED RELEASE ORAL at 11:05

## 2018-10-05 RX ADMIN — PREGABALIN 1000 MICROGRAM(S): 225 CAPSULE ORAL at 11:05

## 2018-10-05 RX ADMIN — IRON SUCROSE 110 MILLIGRAM(S): 20 INJECTION, SOLUTION INTRAVENOUS at 11:05

## 2018-10-05 RX ADMIN — PANTOPRAZOLE SODIUM 40 MILLIGRAM(S): 20 TABLET, DELAYED RELEASE ORAL at 06:06

## 2018-10-05 NOTE — PROGRESS NOTE ADULT - PROBLEM SELECTOR PLAN 1
acute on chronic anemia  s/p 2 PRBC, IV venofar  Repeat HB stable at 8   GI eval:  Upper GI series with small bowel through  - follow cbc tomorrow

## 2018-10-05 NOTE — CONSULT NOTE ADULT - [facility] MLT D E PC
Workers' Compensation INITIAL Office Visit  Employer: [employer]  Date of Injury: 4/20/2017    CHIEF COMPLAINT:   Chief Complaint   Patient presents with   • Worker's Compensation     WRF BACK PAIN/[employer]       HISTORY OF PRESENT ILLNESS:   Isabel Reyes-Mateo is a 52 year old female, who presents with a complaint of an injury that reportedly occurred during work activities.  Rosi first presented to our clinic for back pain on 4/21/2017.  She was treated conservatively over 3 weeks and on 5/12/2017 Rosi was discharged from care.  At that time, Rosi did report some soreness at the end of the day.  It was felt that this was a normal fatigue associated with regular work.  During that evaluation she did not exhibit concerns for radicular symptoms.    Rosi returns today stating \"I want to know what is going on\".  She fully admits that she has presented to the ER 3 times over the last few months.  She gets a work up and no answer is given.  Rosi states that there is always pain in the left low back.  It is made worse with bending and lifting.  When she stands for a longer time there is increased pain.  She describes the pain as like someone is pushing a knuckle into there back.  If the pain gets particularly bad there will be some pain in the left hip and upper leg.  She says to goes to the knee.  Sometimes when the pain is aggravated she will bend forward to stretch it out and this helps.  She will also stand holding on to something and lifting the left foot.  She uses OTC ibuprofen for pain.    Mechanism of Injury:  She states that while she was at work on 4/20/2017, she was lifting one of the children at the .  For further specifics please see Lexi Barlow's note on 4/21/2017.    OCCUPATIONAL HISTORY:  She works at [employer]  Job Title:   She denies any other precipitating event or activity.  She has no history of previous problems.    REVIEW OF  SYSTEMS:   A review of systems was performed and findings relevant to this injury are included in the History of Present Illness.     SMOKING HISTORY: Smoking history is reviewed in the record.    ALLERGIES: Allergies are reviewed in the record.     CURRENT MEDICATIONS: Current Medications are reviewed.   Medications relevant to this injury include:   Ibuprofen    PROBLEM LIST: The Problem List is reviewed in the record and findings relevant to the injury are included in the History of Present Illness.    PAST HISTORY: Past medical history and past surgical history are reviewed in the record and findings relevant to the injury are included in the History of Present Illness..    PHYSICAL EXAMINATION: Rosi is a well developed female, who appears in no acute distress.    She is awake, alert and cooperative and pleasant.   Vitals:    08/30/17 1351   BP: 110/80   Pulse: 76   Resp: 12     Physical Exam   Constitutional: She is oriented to person, place, and time. She appears well-developed and well-nourished.   HENT:   Head: Normocephalic and atraumatic.   Eyes: Conjunctivae are normal.   Pulmonary/Chest: Effort normal.   Abdominal: She exhibits no distension.   Musculoskeletal:        Back:    Back - Active range of motion is good with lateral flexion, twisting, and forward flexion.  Normal standard, toe, and heel gait.  Negative SLR.   Neurological: She is alert and oriented to person, place, and time.   Reflex Scores:       Patellar reflexes are 2+ on the right side and 2+ on the left side.       Achilles reflexes are 2+ on the right side and 2+ on the left side.    DIAGNOSTICS:  None    DIAGNOSIS:  Rosi was seen today for worker's compensation.    Chronic bilateral low back pain without sciatica  Plan:  I reviewed Rosi's diagnosis with her.  There is chronic low back pain as it has lasted >3 months.  She has some symptoms in the upper leg that are not true radicular symptoms.  I discussed that there is no easy  answer with chronic low back pain.  She can try yoga, meditation or exercises.  We discussed physical therapy would be a good place to start.  No medications are needed and there are no easy fixes.    Regarding work causation, I explained that she was discharged from care in May.  It is difficult to say with honesty that her current symptoms are then related to that incident.  There are no other hazards or exposures she reports to me that would lead to these symptoms; therefore, I cannot say it is non-work related.  I am therefore assessing this as undetermined.  Rosi was not pleased with this assessment.  I explained she can obtain a second opinion through work comp if she would like.  She wanted to defer any further treatment or follow up for concerns of costs.    STATUS: This injury is undetermined as to causation at this time.     RETURN TO WORK:  Employee may return to work without restrictions.  Return Date: 8/30/2017       RESTRICTIONS:   Restrictions are to be followed at work and at home.  Restrictions are in effect until next follow-up visit.  No Restrictions     TREATMENT PLAN:  Medications for this injury/condition: tylenol as needed  Referral/Consult:  Discussed physical therapy  Diagnostic Testing: None      Instructions: continue regular stretching and be active as tolerated.    FOLLOW-UP: Return if symptoms worsen or fail to improve.  She is to follow-up sooner if her symptoms should get worse.     See Return to Work Report for further information.     regular rate and rhythm/no rub

## 2018-10-05 NOTE — CONSULT NOTE ADULT - SUBJECTIVE AND OBJECTIVE BOX
Patient is a 63y old  Female who presents with a chief complaint of Low Hemoglobin (04 Oct 2018 11:30)      HPI:  63 Female from home, walks independently, lives with daughter, with PMH of Recurrent SBO's, DVT LUE secondary to PICC line (On Xarelto since March 2018), Chronic B/L leg swelling, Normocytic Anemia (baseline Hb 7.5-8.5), was sent from Dr. Ross as her Hb was low. Pt has felt generalized weakness more commonly in her legs. She has also felt some cramps in her hands and fingers. Pt was also admitted in April for low H/H and underwent EGD and Colonoscopy by Dr Chatterjee which was negative for acute bleeding source. She was restarted on her oral anticoagulation for DVT and was asked to follow up for outpatient Capsule EGD which she hasn't been able to get. Pt denies chest pain, shortness of breath, abdominal distention and constipation.     ED Course: Pt was hypotensive to 89/57 without tachycardia. Hb was low to 6.3. Cardiac enzymes were normal. EKG showed NSR @ 68 bpm.  Anemia panel showed Iron deficiency. Pt was given a unit of blood and 1 Liter Bolus. (03 Oct 2018 16:12)       ROS:  Negative except for:    PAST MEDICAL & SURGICAL HISTORY:  Constipation  DVT (deep venous thrombosis)  SBO (small bowel obstruction)  HTN (hypertension)  History of intestinal surgery      SOCIAL HISTORY:    FAMILY HISTORY:  No pertinent family history in first degree relatives      MEDICATIONS  (STANDING):  cyanocobalamin Injectable 1000 MICROGram(s) IntraMuscular daily  ferrous    sulfate 325 milliGRAM(s) Oral daily  iron sucrose IVPB 200 milliGRAM(s) IV Intermittent every 24 hours  lactated ringers. 1000 milliLiter(s) (75 mL/Hr) IV Continuous <Continuous>  pantoprazole  Injectable 40 milliGRAM(s) IV Push two times a day    MEDICATIONS  (PRN):      Allergies    No Known Allergies    Intolerances        Vital Signs Last 24 Hrs  T(C): 36.8 (04 Oct 2018 14:17), Max: 37.3 (03 Oct 2018 23:56)  T(F): 98.2 (04 Oct 2018 14:17), Max: 99.2 (03 Oct 2018 23:56)  HR: 62 (04 Oct 2018 14:17) (59 - 69)  BP: 91/55 (04 Oct 2018 14:17) (87/53 - 109/68)  BP(mean): --  RR: 16 (04 Oct 2018 14:17) (16 - 18)  SpO2: 98% (04 Oct 2018 14:17) (98% - 100%)    PHYSICAL EXAM  General: adult in NAD  HEENT: clear oropharynx, anicteric sclera, pink conjunctiva  Neck: supple  CV: normal S1/S2 with no murmur rubs or gallops  Lungs: positive air movement b/l ant lungs,clear to auscultation, no wheezes, no rales  Abdomen: soft non-tender non-distended, no hepatosplenomegaly  Ext: no clubbing cyanosis or edema  Skin: no rashes and no petechiae  Neuro: alert and oriented X 4, no focal deficits      LABS:                          7.6    5.8   )-----------( 415      ( 04 Oct 2018 16:57 )             24.9         Mean Cell Volume : 83.8 fl  Mean Cell Hemoglobin : 25.6 pg  Mean Cell Hemoglobin Concentration : 30.5 gm/dL  Auto Neutrophil # : x  Auto Lymphocyte # : x  Auto Monocyte # : x  Auto Eosinophil # : x  Auto Basophil # : x  Auto Neutrophil % : x  Auto Lymphocyte % : x  Auto Monocyte % : x  Auto Eosinophil % : x  Auto Basophil % : x      Serial CBC's  10-04 @ 16:57  Hct-24.9 / Hgb-7.6 / Plat-415 / RBC-2.97 / WBC-5.8  Serial CBC's  10-04 @ 13:50  Hct--- / Hgb--- / Plat--- / RBC-3.07 / WBC---  Serial CBC's  10-04 @ 10:17  Hct-28.3 / Hgb-8.3 / Plat-462 / RBC-3.25 / WBC-5.3  Serial CBC's  10-03 @ 15:10  Hct-22.2 / Hgb-6.3 / Plat-483 / RBC-2.59 / WBC-5.5      10-04    139  |  108  |  17  ----------------------------<  119<H>  4.3   |  26  |  0.74    Ca    8.4      04 Oct 2018 10:17  Phos  3.0     10-04  Mg     1.9     10-04    TPro  5.8<L>  /  Alb  2.5<L>  /  TBili  0.5  /  DBili  x   /  AST  15  /  ALT  28  /  AlkPhos  59  10-04      PT/INR - ( 03 Oct 2018 15:12 )   PT: 12.4 sec;   INR: 1.13 ratio         PTT - ( 03 Oct 2018 15:12 )  PTT:33.0 sec    Reticulocyte Percent: 3.1 % (10-04 @ 13:50)  Vitamin B12, Serum: 343 pg/mL (10-04 @ 13:50)  Vitamin B12, Serum: 359 pg/mL (10-04 @ 01:40)  Folate, Serum: >20.0 ng/mL (10-04 @ 01:40)  Iron - Total Binding Capacity.: 381 ug/dL (10-03 @ 15:10)              BLOOD SMEAR INTERPRETATION:       RADIOLOGY & ADDITIONAL STUDIES:
[  ] STAT REQUEST              [ X ] ROUTINE REQUEST    Patient is a 63y old  Female who presents with a chief complaint of Low Hemoglobin (04 Oct 2018 20:52)      HPI:  63 year old female with multiple medical problems including SBO and DVT on Xarelto admitted with sever anemia. Patient c/o generalized weakness.  Patient recently was admitted with similar reason and she had GI work up including EGD and colonoscopy. She was discharged with recommendation of out patient capsule endoscopy. Patient denies abdominal pain, nausea, vomiting, hematemesis, hematochezia, melena, fever, chills chest pain, SOB, palpitation, cough, hemoptysis, epistaxis, hematuria, dysuria or diarrhea.        PAIN MANAGEMENT:  Pain Scale:                 0/10  Pain Location:      Prior Colonoscopy:  2018    PAST MEDICAL HISTORY  Constipation  DVT    SBO    HTN        PAST SURGICAL HISTORY  History of intestinal surgery      Allergies    No Known Allergies          MEDICATIONS  (STANDING):  cyanocobalamin Injectable 1000 MICROGram(s) IntraMuscular daily  lactated ringers. 1000 milliLiter(s) (75 mL/Hr) IV Continuous <Continuous>  pantoprazole  Injectable 40 milliGRAM(s) IV Push daily         SOCIAL HISTORY  Advanced Directives:       [ X ] Full Code       [  ] DNR  Marital Status:         [  ] M      [ X ] S      [  ] D       [  ] W  Children:       [ X ] Yes      [  ] No  Occupation:        [  ] Employed       [ X ] Unemployed       [  ] Retired  Diet:       [ X ] Regular       [  ] PEG feeding          [  ] NG tube feeding  Drug Use:           [ X ] Patient denied          [  ] Yes  Alcohol:           [ X ] No             [  ] Yes (socially)         [  ] Yes (chronic)  Tobacco:           [  ] Yes           [ X ] No      FAMILY HISTORY  [ X ] Heart Disease            [  ] Diabetes             [  ] HTN             [  ] Colon Cancer             [  ] Stomach Cancer              [  ] Pancreatic Cancer        VITAL SIGNS   Vital Signs Last 24 Hrs  T(C): 36.5 (05 Oct 2018 15:01), Max: 36.6 (04 Oct 2018 21:11)  T(F): 97.7 (05 Oct 2018 15:01), Max: 97.9 (04 Oct 2018 21:11)  HR: 71 (05 Oct 2018 15:01) (62 - 71)  BP: 98/59 (05 Oct 2018 15:01) (95/59 - 98/66)   RR: 16 (05 Oct 2018 15:) (16 - 17)  SpO2: 100% (05 Oct 2018 15:) (98% - 100%)    Daily Weight in k.4 (05 Oct 2018 04:33)           CBC Full  -  ( 05 Oct 2018 07:32 )  WBC Count : 5.9 K/uL  Hemoglobin : 8.1 g/dL  Hematocrit : 26.4 %  Platelet Count - Automated : 430 K/uL  Mean Cell Volume : 84.5 fl  Mean Cell Hemoglobin : 25.7 pg  Mean Cell Hemoglobin Concentration : 30.4 gm/dL  Auto Neutrophil # : 3.6 K/uL  Auto Lymphocyte # : 1.6 K/uL  Auto Monocyte # : 0.6 K/uL  Auto Eosinophil # : 0.1 K/uL  Auto Basophil # : 0.1 K/uL  Auto Neutrophil % : 60.5 %  Auto Lymphocyte % : 26.9 %  Auto Monocyte % : 10.1 %  Auto Eosinophil % : 1.1 %  Auto Basophil % : 1.4 %      10-05    140  |  109<H>  |  17  ----------------------------<  72  4.6   |  25  |  0.82    Ca    8.3<L>      05 Oct 2018 07:32  Phos  2.9     10-05  Mg     1.8     10-05    TPro  5.5<L>  /  Alb  2.4<L>  /  TBili  0.5  /  DBili  x   /  AST  14  /  ALT  28  /  AlkPhos  58  10-05     Occult Blood, Feces (10.03.18 @ 16:48)    Occult Blood, Feces: Positive    Urinalysis (18 @ 10:43)    pH Urine: 5.0    Glucose Qualitative, Urine: Negative    Blood, Urine: Negative    Color: Yellow    Urine Appearance: Clear    Bilirubin: Small    Ketone - Urine: Trace    Specific Gravity: 1.025    Protein, Urine: 30 mg/dL    Urobilinogen: 1    Nitrite: Negative    Leukocyte Esterase Concentration: Negative    Iron with Total Binding Capacity (10.03.18 @ 15:10)    Iron - Total Binding Capacity.: 381 ug/dL    % Saturation, Iron: 5 %    Iron Total, Serum: 18 ug/dL    Unsaturated Iron Binding Capacity: 363 ug/dL      ECG  Ventricular Rate 68 BPM    Atrial Rate 68 BPM    P-R Interval 136 ms    QRS Duration 78 ms    Q-T Interval 384 ms    QTC Calculation(Bezet) 408 ms    P Axis 38 degrees    R Axis -1 degrees    T Axis 15 degrees    Diagnosis Line Normal sinus rhythm  Minimal voltage criteria for LVH, may be normal variant  Borderline ECG    RADIOLOGY/IMAGING       EXAM:  CT ABDOMEN AND PELVIS IC                            PROCEDURE DATE:  2018          INTERPRETATION:  CT abdomen and pelvis. Patient has abdominal bloating   and vomiting. Patient received 97 cc of Omnipaque 350 IV by power   injector. 3cc was discarded. Patient was unable to tolerate oral prep.    The lower heart is upper normal in size. The lung bases are unremarkable.   There is some mild to moderate hiatal hernia somewhat more pronounced   than on 2017.    In the abdomen gallstones and a contracted gallbladder again noted.    The liver, spleen, and pancreas appear within normal limits.    The kidneys appear normal in size, shape, and axis. There are no renal   stones. The kidneys function equally without hydronephrosis.    The abdominal aorta is unremarkable. No celiac or periaortic adenopathy   is evident.    CT pelvis.    The bladder is decompressed. The uterus is grossly normal. No iliac or   inguinal adenopathy is evident.     There is slight pelvic fluid.    The colon is largely decompressed. There is likely been right colectomy   and this appears new since 2017. The small bowel is presently   markedly distended consistent with bowel obstruction. Small bowel loops   measure up to 7.5 cm in diameter. Zone of transition may be in the left   upper quadrant at the small bowel colonic anastomosis.    There is diffuse mild left lumbar curve.    The degree of small bowel distention is actually increased from prior   study of 2017.    IMPRESSION: There is likely been right colectomy since 2017. There   is now a significant small bowel obstruction likely at the small bowel   colonic anastomosis in the left upper quadrant. Slight fluid is seen in   the pelvis. Gallstones and incidental findings as above.                  EXAM:  XR CHEST PORTABLE IMMED 1V                            PROCEDURE DATE:  10/03/2018          INTERPRETATION:  CLINICAL STATEMENT: Chest pain.    TECHNIQUE: AP view of the chest.    COMPARISON: 4/10/2018    FINDINGS/  IMPRESSION:  No consolidation or pleural effusion    Heart size within normal limits.          Surgical Pathology Final Report -  (18 @ 09:24)    Surgical Pathology Final Report - :   ACCESSION No:  [id number]    MARIBETH PADILLA        Surgical Final Report          Final Diagnosis    1. Descending colon; biopsy:  Tubular adenoma    2. Gastric antrum; biopsy:  Chronic gastritis, moderate with focal fibrotic changes of the  lamina propria  - H. pylori not identified with cresyl ed  stain    Sindhu Gheewala, M.D.  (Electronic Signature)  Reported on: 18    Clinical Information  r/o neoplasm  r/o H. Pylori  EGD/Colonoscopy    Specimen Description  1-Descending colon  2-Antrum    Gross Description  1.   The specimen is received in formalin and the specimen  container is labeled: Descending colon.  It consists of a  fragment of tan-gray soft tissue measuring 0.1 cm in greatest  dimension.  Entirely submitted.  One cassette.    2.   The specimen is received in formalin and the specimen  container is labeled: Antrum.  It consists of a fragment of tan-  gray soft tissue measuring 0.3 cm in greatest dimension.  Entirely submitted.  One cassette.    In addition to other data that may appear on the specimen  containers, all labels have been inspected to confirm the  presence of the patient's name and date of birth.    Histological processing performed at [facility], [place] [address].  [facility] Medical Director:  Chico Venegas M.D.  RV 18 14:42

## 2018-10-05 NOTE — CONSULT NOTE ADULT - ASSESSMENT
63 year old lady with left arm DVT from a PICC line on xarelto has anemia , chronic, but it got worse.  Stool G was positive. Colonoscopy and EGD neg.
The etiology for anemia and positive occult blood in stool is not clear yet.  - R/o small bowel lesion  - AVM's    Suggestions:    1. Monitor H/H  2. Transfuse PRBC as needed  3. Protonix daily  4. Avoid NSAID  5. Upper GI series with small bowel through  6. DVT prophylaxis

## 2018-10-05 NOTE — PROGRESS NOTE ADULT - PROBLEM SELECTOR PLAN 1
DVT at left arm is gone  but there is a new left cephalic vein thrombosis  presumably from the IV line  D dimer is 800  she is quite thrombogenic, although all thrombophilic w/u was neg.  she will continue on xarelto

## 2018-10-05 NOTE — PROGRESS NOTE ADULT - PROBLEM SELECTOR PLAN 1
Acute on Chronic Normocytic anemia  Likely Fe Malabsorption due to recurrent Surgeries 2/2 SBO   Normal Prior EGD and Colonoscopy in April   Pt failed to get o/p Capsule EGD  FOBT +ve (Normal Brown stool on exam) Likely UGIB  s/p 2 PRBC: repeat Hb: 8.3  MCV: 85.7, T. Fe: 18, TIBC: 381, % saturation decreased. MCV: normal, RCW: increased: iron deficiency anemia most likely. follow up on ferritin result   2 large peripheral IV lines  REgular diet started   Will repeat CBC tomorrow  GI consult: Dr Sen HERNANDEZ SERIES OUT PT CAPSULE HOLD ALL ANTICOAGULATIONS

## 2018-10-06 LAB
ALBUMIN SERPL ELPH-MCNC: 2.4 G/DL — LOW (ref 3.5–5)
ALP SERPL-CCNC: 60 U/L — SIGNIFICANT CHANGE UP (ref 40–120)
ALT FLD-CCNC: 27 U/L DA — SIGNIFICANT CHANGE UP (ref 10–60)
ANION GAP SERPL CALC-SCNC: 6 MMOL/L — SIGNIFICANT CHANGE UP (ref 5–17)
AST SERPL-CCNC: 13 U/L — SIGNIFICANT CHANGE UP (ref 10–40)
BASOPHILS # BLD AUTO: 0.1 K/UL — SIGNIFICANT CHANGE UP (ref 0–0.2)
BASOPHILS NFR BLD AUTO: 1.1 % — SIGNIFICANT CHANGE UP (ref 0–2)
BILIRUB SERPL-MCNC: 0.3 MG/DL — SIGNIFICANT CHANGE UP (ref 0.2–1.2)
BUN SERPL-MCNC: 24 MG/DL — HIGH (ref 7–18)
CALCIUM SERPL-MCNC: 8.5 MG/DL — SIGNIFICANT CHANGE UP (ref 8.4–10.5)
CHLORIDE SERPL-SCNC: 110 MMOL/L — HIGH (ref 96–108)
CO2 SERPL-SCNC: 26 MMOL/L — SIGNIFICANT CHANGE UP (ref 22–31)
CREAT SERPL-MCNC: 0.81 MG/DL — SIGNIFICANT CHANGE UP (ref 0.5–1.3)
EOSINOPHIL # BLD AUTO: 0.1 K/UL — SIGNIFICANT CHANGE UP (ref 0–0.5)
EOSINOPHIL NFR BLD AUTO: 0.8 % — SIGNIFICANT CHANGE UP (ref 0–6)
GLUCOSE SERPL-MCNC: 66 MG/DL — LOW (ref 70–99)
HCT VFR BLD CALC: 28.3 % — LOW (ref 34.5–45)
HGB BLD-MCNC: 8.4 G/DL — LOW (ref 11.5–15.5)
LYMPHOCYTES # BLD AUTO: 2.5 K/UL — SIGNIFICANT CHANGE UP (ref 1–3.3)
LYMPHOCYTES # BLD AUTO: 30.8 % — SIGNIFICANT CHANGE UP (ref 13–44)
MAGNESIUM SERPL-MCNC: 1.9 MG/DL — SIGNIFICANT CHANGE UP (ref 1.6–2.6)
MCHC RBC-ENTMCNC: 25.4 PG — LOW (ref 27–34)
MCHC RBC-ENTMCNC: 29.8 GM/DL — LOW (ref 32–36)
MCV RBC AUTO: 85.3 FL — SIGNIFICANT CHANGE UP (ref 80–100)
MONOCYTES # BLD AUTO: 0.7 K/UL — SIGNIFICANT CHANGE UP (ref 0–0.9)
MONOCYTES NFR BLD AUTO: 9.2 % — SIGNIFICANT CHANGE UP (ref 2–14)
NEUTROPHILS # BLD AUTO: 4.7 K/UL — SIGNIFICANT CHANGE UP (ref 1.8–7.4)
NEUTROPHILS NFR BLD AUTO: 58 % — SIGNIFICANT CHANGE UP (ref 43–77)
PHOSPHATE SERPL-MCNC: 2.8 MG/DL — SIGNIFICANT CHANGE UP (ref 2.5–4.5)
PLATELET # BLD AUTO: 410 K/UL — HIGH (ref 150–400)
POTASSIUM SERPL-MCNC: 4.5 MMOL/L — SIGNIFICANT CHANGE UP (ref 3.5–5.3)
POTASSIUM SERPL-SCNC: 4.5 MMOL/L — SIGNIFICANT CHANGE UP (ref 3.5–5.3)
PROT SERPL-MCNC: 5.5 G/DL — LOW (ref 6–8.3)
RBC # BLD: 3.32 M/UL — LOW (ref 3.8–5.2)
RBC # FLD: 18 % — HIGH (ref 10.3–14.5)
SODIUM SERPL-SCNC: 142 MMOL/L — SIGNIFICANT CHANGE UP (ref 135–145)
WBC # BLD: 8.1 K/UL — SIGNIFICANT CHANGE UP (ref 3.8–10.5)
WBC # FLD AUTO: 8.1 K/UL — SIGNIFICANT CHANGE UP (ref 3.8–10.5)

## 2018-10-06 RX ORDER — VANCOMYCIN HCL 1 G
1000 VIAL (EA) INTRAVENOUS ONCE
Qty: 0 | Refills: 0 | Status: COMPLETED | OUTPATIENT
Start: 2018-10-06 | End: 2018-10-06

## 2018-10-06 RX ADMIN — PANTOPRAZOLE SODIUM 40 MILLIGRAM(S): 20 TABLET, DELAYED RELEASE ORAL at 12:50

## 2018-10-06 RX ADMIN — Medication 250 MILLIGRAM(S): at 21:44

## 2018-10-06 RX ADMIN — PREGABALIN 1000 MICROGRAM(S): 225 CAPSULE ORAL at 12:50

## 2018-10-07 ENCOUNTER — TRANSCRIPTION ENCOUNTER (OUTPATIENT)
Age: 63
End: 2018-10-07

## 2018-10-07 LAB
ALBUMIN SERPL ELPH-MCNC: 2.3 G/DL — LOW (ref 3.5–5)
ALP SERPL-CCNC: 58 U/L — SIGNIFICANT CHANGE UP (ref 40–120)
ALT FLD-CCNC: 26 U/L DA — SIGNIFICANT CHANGE UP (ref 10–60)
ANION GAP SERPL CALC-SCNC: 7 MMOL/L — SIGNIFICANT CHANGE UP (ref 5–17)
AST SERPL-CCNC: 12 U/L — SIGNIFICANT CHANGE UP (ref 10–40)
BASOPHILS # BLD AUTO: 0.1 K/UL — SIGNIFICANT CHANGE UP (ref 0–0.2)
BASOPHILS NFR BLD AUTO: 1.8 % — SIGNIFICANT CHANGE UP (ref 0–2)
BILIRUB SERPL-MCNC: 0.4 MG/DL — SIGNIFICANT CHANGE UP (ref 0.2–1.2)
BUN SERPL-MCNC: 23 MG/DL — HIGH (ref 7–18)
CALCIUM SERPL-MCNC: 8.2 MG/DL — LOW (ref 8.4–10.5)
CHLORIDE SERPL-SCNC: 111 MMOL/L — HIGH (ref 96–108)
CO2 SERPL-SCNC: 25 MMOL/L — SIGNIFICANT CHANGE UP (ref 22–31)
CREAT SERPL-MCNC: 0.81 MG/DL — SIGNIFICANT CHANGE UP (ref 0.5–1.3)
EOSINOPHIL # BLD AUTO: 0.1 K/UL — SIGNIFICANT CHANGE UP (ref 0–0.5)
EOSINOPHIL NFR BLD AUTO: 1.5 % — SIGNIFICANT CHANGE UP (ref 0–6)
GLUCOSE BLDC GLUCOMTR-MCNC: 81 MG/DL — SIGNIFICANT CHANGE UP (ref 70–99)
GLUCOSE SERPL-MCNC: 57 MG/DL — LOW (ref 70–99)
HCT VFR BLD CALC: 28.6 % — LOW (ref 34.5–45)
HGB BLD-MCNC: 8.5 G/DL — LOW (ref 11.5–15.5)
LYMPHOCYTES # BLD AUTO: 2.4 K/UL — SIGNIFICANT CHANGE UP (ref 1–3.3)
LYMPHOCYTES # BLD AUTO: 35.5 % — SIGNIFICANT CHANGE UP (ref 13–44)
MAGNESIUM SERPL-MCNC: 2 MG/DL — SIGNIFICANT CHANGE UP (ref 1.6–2.6)
MCHC RBC-ENTMCNC: 26.2 PG — LOW (ref 27–34)
MCHC RBC-ENTMCNC: 29.8 GM/DL — LOW (ref 32–36)
MCV RBC AUTO: 87.9 FL — SIGNIFICANT CHANGE UP (ref 80–100)
MONOCYTES # BLD AUTO: 0.6 K/UL — SIGNIFICANT CHANGE UP (ref 0–0.9)
MONOCYTES NFR BLD AUTO: 8.4 % — SIGNIFICANT CHANGE UP (ref 2–14)
NEUTROPHILS # BLD AUTO: 3.6 K/UL — SIGNIFICANT CHANGE UP (ref 1.8–7.4)
NEUTROPHILS NFR BLD AUTO: 52.8 % — SIGNIFICANT CHANGE UP (ref 43–77)
PHOSPHATE SERPL-MCNC: 3.3 MG/DL — SIGNIFICANT CHANGE UP (ref 2.5–4.5)
PLATELET # BLD AUTO: 399 K/UL — SIGNIFICANT CHANGE UP (ref 150–400)
POTASSIUM SERPL-MCNC: 4.6 MMOL/L — SIGNIFICANT CHANGE UP (ref 3.5–5.3)
POTASSIUM SERPL-SCNC: 4.6 MMOL/L — SIGNIFICANT CHANGE UP (ref 3.5–5.3)
PROT SERPL-MCNC: 5.4 G/DL — LOW (ref 6–8.3)
RBC # BLD: 3.25 M/UL — LOW (ref 3.8–5.2)
RBC # FLD: 19.1 % — HIGH (ref 10.3–14.5)
SODIUM SERPL-SCNC: 143 MMOL/L — SIGNIFICANT CHANGE UP (ref 135–145)
WBC # BLD: 6.8 K/UL — SIGNIFICANT CHANGE UP (ref 3.8–10.5)
WBC # FLD AUTO: 6.8 K/UL — SIGNIFICANT CHANGE UP (ref 3.8–10.5)

## 2018-10-07 RX ADMIN — PANTOPRAZOLE SODIUM 40 MILLIGRAM(S): 20 TABLET, DELAYED RELEASE ORAL at 11:15

## 2018-10-07 RX ADMIN — Medication 325 MILLIGRAM(S): at 11:15

## 2018-10-07 NOTE — PROGRESS NOTE ADULT - PROBLEM SELECTOR PLAN 2
?GIB  she has been on xarelto  had transfusion  for outpt capsule  she also has high Sed rate  anemia of chronic illness contribute to it.
Doppler noted as above  Dr Rivera: patient will continue Xarelto, will restart once cleared by GI
SHIVAM DVT, Pt takes Xarelto 15 at home for DVT that happened in March secondary to PICC line  Dr Rivera consult noted  - Doppler pending   If Doppler is negative, d/c AC
SHIVAM DVT, Pt takes Xarelto 15 at home for DVT that happened in March secondary to PICC line  Dr Rivera consult noted  - Doppler pending   If Doppler is negative, d/c AC  XARELTO CLEARANCE AS PER GI ONCOLOGY APPRECIATED
SHIVAM DVT, Pt takes Xarelto 15 at home for DVT that happened in March secondary to PICC line  Pt has completed >6 months of Anticoagulation  Discontinue AC   Dr Rivera consult
SHIVAM DVT, Pt takes Xarelto 15 at home for DVT that happened in March secondary to PICC line  Pt has completed >6 months of Anticoagulation  Discontinue AC   Dr Rivera consult

## 2018-10-07 NOTE — PROGRESS NOTE ADULT - PROBLEM SELECTOR PLAN 1
s/p 2 PRBC, Hb stable at 8   GI eval:  Upper GI series with small bowel through  - follow cbc tomorrow

## 2018-10-07 NOTE — DISCHARGE NOTE ADULT - HOSPITAL COURSE
63 Female from home, walks independently, lives with daughter, with PMH of Recurrent SBO's, DVT LUE secondary to PICC line (On Xarelto since March 2018), Chronic B/L leg swelling, Normocytic Anemia (baseline Hb 7.5-8.5), was sent from Dr. Ross as her Hb was low. Pt has felt generalized weakness more commonly in her legs. She has also felt some cramps in her hands and fingers. Pt was also admitted in April for low H/H and underwent EGD and Colonoscopy by Dr Chatterjee which was negative for acute bleeding source. She was restarted on her oral anticoagulation for DVT and was asked to follow up for outpatient Capsule EGD which she hasn't been able to get. Pt denies chest pain, shortness of breath, abdominal distention and constipation.     For Anemia,  Acute on Chronic Normocytic anemia, Likely Fe Malabsorption due to recurrent Surgeries 2/2 SBO, Normal Prior EGD and Colonoscopy in April, Pt failed to get o/p Capsule EGD, FOBT +ve (Normal Brown stool on exam) Likely UGIB, s/p 2 PRBC and IV Venofer for 2 days.   For DVT (deep venous thrombosis).  Pt took Xarelto 15 at home for DVT that happened in March secondary to PICC line, Pt has completed >6 months of Anticoagulation. AC was held during hospitalisation due to FOBT +. Doppler showed DVT at left arm is gone  but there is a new left cephalic vein thrombosis, presumably from the IV line  D dimer is 800, she is quite thrombogenic, although all thrombophilic w/u was neg.  she will continue on xarelto, once cleared by GI.    GI followed the patient and recommended Upper GI series with SB through which showed--------------    Patient is medically stable to be discharged. Case discussed with the attending, 63 Female from home, walks independently, lives with daughter, with PMH of Recurrent SBO's, DVT LUE secondary to PICC line (On Xarelto since March 2018), Chronic B/L leg swelling, Normocytic Anemia (baseline Hb 7.5-8.5), was sent from Dr. Ross as her Hb was low. Pt has felt generalized weakness more commonly in her legs. She has also felt some cramps in her hands and fingers. Pt was also admitted in April for low H/H and underwent EGD and Colonoscopy by Dr Chatterjee which was negative for acute bleeding source. She was restarted on her oral anticoagulation for DVT and was asked to follow up for outpatient Capsule EGD which she hasn't been able to get. Pt denies chest pain, shortness of breath, abdominal distention and constipation.     For Anemia,  Acute on Chronic Normocytic anemia, Likely Fe Malabsorption due to recurrent Surgeries 2/2 SBO, Normal Prior EGD and Colonoscopy in April, Pt failed to get o/p Capsule EGD, FOBT +ve (Normal Brown stool on exam) Likely UGIB, s/p 2 PRBC and IV Venofer for 2 days.   For DVT (deep venous thrombosis).  Pt took Xarelto 15 at home for DVT that happened in March secondary to PICC line, Pt has completed >6 months of Anticoagulation. AC was held during hospitalisation due to FOBT +. Doppler showed DVT at left arm is gone  but there is a new left cephalic vein thrombosis, presumably from the IV line  D dimer is 800, she is quite thrombogenic, although all thrombophilic w/u was neg.  she will continue on xarelto, once cleared by GI.    GI followed the patient and recommended Upper GI series with SB through which showedLimited upper GI examination and small bowel series, as described, demonstrates increased caliber of the mid and distalaspects   of the small bowel suggesting partial mechanical small bowel obstruction.     Patient is medically stable to be discharged. Case discussed with the attending,

## 2018-10-07 NOTE — DISCHARGE NOTE ADULT - CARE PLAN
Principal Discharge DX:	Anemia  Goal:	Hb>11  Assessment and plan of treatment:	You presented with HB of less the 7. You recently had EGD and Colonoscopy on last admission which was negative. You were recommended to get capsule endoscopy on outpatient  basis on the OP basis. On this admission, you received 2 PRBC, IV Venofar. GI recommended upper GI series and SB through which showed--- Your Iron study was consistent with iron deficiency anemia. You are recommended to take oral iron supplements and follow up with PCP in 1 week  Secondary Diagnosis:	DVT (deep venous thrombosis)  Assessment and plan of treatment:	You were Xarelto since march when you developed thrombosis secondary to PICC line. Anticogulation was held on admission for concern of GI bleed. Doppler scan: DVT at left arm is gone  but there is a new left cephalic vein thrombosis, presumably from the IV line. You are recommended to continue to take Xarelto once cleared by GI and follow up with PCP in 1 week  Secondary Diagnosis:	HTN (hypertension)  Secondary Diagnosis:	SBO (small bowel obstruction) Principal Discharge DX:	Anemia  Goal:	Hb>11  Assessment and plan of treatment:	You presented with HB of less the 7. You recently had EGD and Colonoscopy on last admission which was negative. You were recommended to get capsule endoscopy on outpatient  basis on the OP basis. On this admission, you received 2 PRBC, IV Venofar. GI recommended upper GI series and SB through which showed demonstrates increased caliber of the mid and distal aspects of the small bowel suggesting partial mechanical small bowel obstruction. .Your Iron study was consistent with iron deficiency anemia. You are recommended to take oral iron supplements, folic acid, Protonix and follow up with PCP in 1 week  Secondary Diagnosis:	DVT (deep venous thrombosis)  Assessment and plan of treatment:	You were Xarelto since march when you developed thrombosis secondary to PICC line. Anticogulation was held on admission for concern of GI bleed. Doppler scan: DVT at left arm is gone  but there is a new left cephalic vein thrombosis, presumably from the IV line. You are recommended to continue to take Xarelto and follow up with PCP in 1 week  Secondary Diagnosis:	HTN (hypertension)  Assessment and plan of treatment:	You are recommended to take home medications as advised and follow up with PCP in 1 week  Secondary Diagnosis:	SBO (small bowel obstruction)  Assessment and plan of treatment:	You are recommended to follow up with PCP, also recommended to get Capsule endoscopy to fine possible source of bleed, on OP basis

## 2018-10-07 NOTE — PROGRESS NOTE ADULT - PROBLEM SELECTOR PLAN 6
Improve score 4  Holding Chem ppx due to Anemia  SCD boots only

## 2018-10-07 NOTE — DISCHARGE NOTE ADULT - MEDICATION SUMMARY - MEDICATIONS TO STOP TAKING
I will STOP taking the medications listed below when I get home from the hospital:    Lasix 40 mg oral tablet  -- 1 tab(s) by mouth 2 times a day

## 2018-10-07 NOTE — PROGRESS NOTE ADULT - PROBLEM SELECTOR PROBLEM 3
Edema, lower extremity

## 2018-10-07 NOTE — PROGRESS NOTE ADULT - PROBLEM SELECTOR PROBLEM 4
History of small bowel obstruction

## 2018-10-07 NOTE — DISCHARGE NOTE ADULT - PATIENT PORTAL LINK FT
You can access the [facility] Patient Portal, offered by [facility], by registering with the following website: [email or web address]

## 2018-10-07 NOTE — DISCHARGE NOTE ADULT - PLAN OF CARE
Hb>11 You presented with HB of less the 7. You recently had EGD and Colonoscopy on last admission which was negative. You were recommended to get capsule endoscopy on outpatient  basis on the OP basis. On this admission, you received 2 PRBC, IV Venofar. GI recommended upper GI series and SB through which showed--- Your Iron study was consistent with iron deficiency anemia. You are recommended to take oral iron supplements and follow up with PCP in 1 week You were Xarelto since march when you developed thrombosis secondary to PICC line. Anticogulation was held on admission for concern of GI bleed. Doppler scan: DVT at left arm is gone  but there is a new left cephalic vein thrombosis, presumably from the IV line. You are recommended to continue to take Xarelto once cleared by GI and follow up with PCP in 1 week You presented with HB of less the 7. You recently had EGD and Colonoscopy on last admission which was negative. You were recommended to get capsule endoscopy on outpatient  basis on the OP basis. On this admission, you received 2 PRBC, IV Venofar. GI recommended upper GI series and SB through which showed demonstrates increased caliber of the mid and distal aspects of the small bowel suggesting partial mechanical small bowel obstruction. .Your Iron study was consistent with iron deficiency anemia. You are recommended to take oral iron supplements, folic acid, Protonix and follow up with PCP in 1 week You were Xarelto since march when you developed thrombosis secondary to PICC line. Anticogulation was held on admission for concern of GI bleed. Doppler scan: DVT at left arm is gone  but there is a new left cephalic vein thrombosis, presumably from the IV line. You are recommended to continue to take Xarelto and follow up with PCP in 1 week You are recommended to take home medications as advised and follow up with PCP in 1 week You are recommended to follow up with PCP, also recommended to get Capsule endoscopy to fine possible source of bleed, on OP basis

## 2018-10-07 NOTE — DISCHARGE NOTE ADULT - MEDICATION SUMMARY - MEDICATIONS TO TAKE
I will START or STAY ON the medications listed below when I get home from the hospital:    Xarelto 15 mg oral tablet  -- 1 tab(s) by mouth once a day (in the evening)   -- Check with your doctor before becoming pregnant.  It is very important that you take or use this exactly as directed.  Do not skip doses or discontinue unless directed by your doctor.  Obtain medical advice before taking any non-prescription drugs as some may affect the action of this medication.  Take with food.    -- Indication: For DVT (deep venous thrombosis)    Linzess 145 mcg oral capsule  -- 1 cap(s) by mouth once a day  -- Indication: For GI agent     ferrous sulfate 325 mg (65 mg elemental iron) oral tablet  -- 1 tab(s) by mouth once a day   -- Check with your doctor before becoming pregnant.  Do not chew, break, or crush.  May discolor urine or feces.    -- Indication: For Supplements    pantoprazole 40 mg oral delayed release tablet  -- 1 tab(s) by mouth once a day  for 4 week   -- Indication: For proton pump inhibitors    Vitamin D2 50,000 intl units (1.25 mg) oral capsule  -- orally once a month  -- Indication: For Supplement    folic acid 0.4 mg oral tablet  -- 1 tab(s) by mouth once a day   -- Indication: For Supplements

## 2018-10-07 NOTE — PROGRESS NOTE ADULT - PROBLEM SELECTOR PROBLEM 2
Anemia
DVT (deep venous thrombosis)

## 2018-10-08 VITALS
SYSTOLIC BLOOD PRESSURE: 107 MMHG | OXYGEN SATURATION: 100 % | TEMPERATURE: 97 F | DIASTOLIC BLOOD PRESSURE: 71 MMHG | RESPIRATION RATE: 14 BRPM | HEART RATE: 73 BPM

## 2018-10-08 LAB
ALBUMIN SERPL ELPH-MCNC: 2.3 G/DL — LOW (ref 3.5–5)
ALP SERPL-CCNC: 59 U/L — SIGNIFICANT CHANGE UP (ref 40–120)
ALT FLD-CCNC: 24 U/L DA — SIGNIFICANT CHANGE UP (ref 10–60)
ANION GAP SERPL CALC-SCNC: 6 MMOL/L — SIGNIFICANT CHANGE UP (ref 5–17)
AST SERPL-CCNC: 11 U/L — SIGNIFICANT CHANGE UP (ref 10–40)
BASOPHILS # BLD AUTO: 0.1 K/UL — SIGNIFICANT CHANGE UP (ref 0–0.2)
BASOPHILS NFR BLD AUTO: 2.1 % — HIGH (ref 0–2)
BILIRUB SERPL-MCNC: 0.3 MG/DL — SIGNIFICANT CHANGE UP (ref 0.2–1.2)
BUN SERPL-MCNC: 22 MG/DL — HIGH (ref 7–18)
CALCIUM SERPL-MCNC: 8.2 MG/DL — LOW (ref 8.4–10.5)
CHLORIDE SERPL-SCNC: 110 MMOL/L — HIGH (ref 96–108)
CO2 SERPL-SCNC: 26 MMOL/L — SIGNIFICANT CHANGE UP (ref 22–31)
CREAT SERPL-MCNC: 0.79 MG/DL — SIGNIFICANT CHANGE UP (ref 0.5–1.3)
EOSINOPHIL # BLD AUTO: 0.1 K/UL — SIGNIFICANT CHANGE UP (ref 0–0.5)
EOSINOPHIL NFR BLD AUTO: 1.8 % — SIGNIFICANT CHANGE UP (ref 0–6)
GLUCOSE SERPL-MCNC: 73 MG/DL — SIGNIFICANT CHANGE UP (ref 70–99)
HCT VFR BLD CALC: 28.9 % — LOW (ref 34.5–45)
HGB BLD-MCNC: 8.3 G/DL — LOW (ref 11.5–15.5)
LYMPHOCYTES # BLD AUTO: 1.9 K/UL — SIGNIFICANT CHANGE UP (ref 1–3.3)
LYMPHOCYTES # BLD AUTO: 32.1 % — SIGNIFICANT CHANGE UP (ref 13–44)
MAGNESIUM SERPL-MCNC: 1.9 MG/DL — SIGNIFICANT CHANGE UP (ref 1.6–2.6)
MCHC RBC-ENTMCNC: 25.7 PG — LOW (ref 27–34)
MCHC RBC-ENTMCNC: 28.7 GM/DL — LOW (ref 32–36)
MCV RBC AUTO: 89.6 FL — SIGNIFICANT CHANGE UP (ref 80–100)
MONOCYTES # BLD AUTO: 0.5 K/UL — SIGNIFICANT CHANGE UP (ref 0–0.9)
MONOCYTES NFR BLD AUTO: 9.4 % — SIGNIFICANT CHANGE UP (ref 2–14)
NEUTROPHILS # BLD AUTO: 3.2 K/UL — SIGNIFICANT CHANGE UP (ref 1.8–7.4)
NEUTROPHILS NFR BLD AUTO: 54.6 % — SIGNIFICANT CHANGE UP (ref 43–77)
PHOSPHATE SERPL-MCNC: 3.2 MG/DL — SIGNIFICANT CHANGE UP (ref 2.5–4.5)
PLATELET # BLD AUTO: 383 K/UL — SIGNIFICANT CHANGE UP (ref 150–400)
POTASSIUM SERPL-MCNC: 5 MMOL/L — SIGNIFICANT CHANGE UP (ref 3.5–5.3)
POTASSIUM SERPL-SCNC: 5 MMOL/L — SIGNIFICANT CHANGE UP (ref 3.5–5.3)
PROT SERPL-MCNC: 5.4 G/DL — LOW (ref 6–8.3)
RBC # BLD: 3.23 M/UL — LOW (ref 3.8–5.2)
RBC # FLD: 21.2 % — HIGH (ref 10.3–14.5)
SODIUM SERPL-SCNC: 142 MMOL/L — SIGNIFICANT CHANGE UP (ref 135–145)
WBC # BLD: 5.8 K/UL — SIGNIFICANT CHANGE UP (ref 3.8–10.5)
WBC # FLD AUTO: 5.8 K/UL — SIGNIFICANT CHANGE UP (ref 3.8–10.5)

## 2018-10-08 PROCEDURE — 85730 THROMBOPLASTIN TIME PARTIAL: CPT

## 2018-10-08 PROCEDURE — 83615 LACTATE (LD) (LDH) ENZYME: CPT

## 2018-10-08 PROCEDURE — 86923 COMPATIBILITY TEST ELECTRIC: CPT

## 2018-10-08 PROCEDURE — 36430 TRANSFUSION BLD/BLD COMPNT: CPT

## 2018-10-08 PROCEDURE — 74245: CPT

## 2018-10-08 PROCEDURE — 83735 ASSAY OF MAGNESIUM: CPT

## 2018-10-08 PROCEDURE — 84443 ASSAY THYROID STIM HORMONE: CPT

## 2018-10-08 PROCEDURE — 83550 IRON BINDING TEST: CPT

## 2018-10-08 PROCEDURE — P9040: CPT

## 2018-10-08 PROCEDURE — 82962 GLUCOSE BLOOD TEST: CPT

## 2018-10-08 PROCEDURE — 99285 EMERGENCY DEPT VISIT HI MDM: CPT | Mod: 25

## 2018-10-08 PROCEDURE — 84484 ASSAY OF TROPONIN QUANT: CPT

## 2018-10-08 PROCEDURE — 83036 HEMOGLOBIN GLYCOSYLATED A1C: CPT

## 2018-10-08 PROCEDURE — 74245: CPT | Mod: 26

## 2018-10-08 PROCEDURE — 93971 EXTREMITY STUDY: CPT

## 2018-10-08 PROCEDURE — 85045 AUTOMATED RETICULOCYTE COUNT: CPT

## 2018-10-08 PROCEDURE — 80061 LIPID PANEL: CPT

## 2018-10-08 PROCEDURE — 84100 ASSAY OF PHOSPHORUS: CPT

## 2018-10-08 PROCEDURE — 93005 ELECTROCARDIOGRAM TRACING: CPT

## 2018-10-08 PROCEDURE — 86901 BLOOD TYPING SEROLOGIC RH(D): CPT

## 2018-10-08 PROCEDURE — 85610 PROTHROMBIN TIME: CPT

## 2018-10-08 PROCEDURE — 85379 FIBRIN DEGRADATION QUANT: CPT

## 2018-10-08 PROCEDURE — 82746 ASSAY OF FOLIC ACID SERUM: CPT

## 2018-10-08 PROCEDURE — 85027 COMPLETE CBC AUTOMATED: CPT

## 2018-10-08 PROCEDURE — 80053 COMPREHEN METABOLIC PANEL: CPT

## 2018-10-08 PROCEDURE — 82728 ASSAY OF FERRITIN: CPT

## 2018-10-08 PROCEDURE — 86850 RBC ANTIBODY SCREEN: CPT

## 2018-10-08 PROCEDURE — 82607 VITAMIN B-12: CPT

## 2018-10-08 PROCEDURE — 82272 OCCULT BLD FECES 1-3 TESTS: CPT

## 2018-10-08 PROCEDURE — 71045 X-RAY EXAM CHEST 1 VIEW: CPT

## 2018-10-08 PROCEDURE — 86900 BLOOD TYPING SEROLOGIC ABO: CPT

## 2018-10-08 RX ORDER — FONDAPARINUX SODIUM 2.5 MG/.5ML
1 INJECTION, SOLUTION SUBCUTANEOUS
Qty: 30 | Refills: 0 | OUTPATIENT
Start: 2018-10-08 | End: 2018-11-06

## 2018-10-08 RX ORDER — FERROUS SULFATE 325(65) MG
1 TABLET ORAL
Qty: 30 | Refills: 0
Start: 2018-10-08 | End: 2018-11-06

## 2018-10-08 RX ORDER — PANTOPRAZOLE SODIUM 20 MG/1
40 TABLET, DELAYED RELEASE ORAL
Qty: 0 | Refills: 0 | Status: DISCONTINUED | OUTPATIENT
Start: 2018-10-08 | End: 2018-10-08

## 2018-10-08 RX ORDER — FOLIC ACID 0.8 MG
1 TABLET ORAL
Qty: 30 | Refills: 0
Start: 2018-10-08 | End: 2018-11-06

## 2018-10-08 RX ORDER — PANTOPRAZOLE SODIUM 20 MG/1
1 TABLET, DELAYED RELEASE ORAL
Qty: 30 | Refills: 0
Start: 2018-10-08 | End: 2018-11-06

## 2018-10-08 RX ORDER — FUROSEMIDE 40 MG
1 TABLET ORAL
Qty: 0 | Refills: 0 | COMMUNITY

## 2018-10-08 RX ORDER — PANTOPRAZOLE SODIUM 20 MG/1
1 TABLET, DELAYED RELEASE ORAL
Qty: 0 | Refills: 0 | COMMUNITY

## 2018-10-08 RX ADMIN — PANTOPRAZOLE SODIUM 40 MILLIGRAM(S): 20 TABLET, DELAYED RELEASE ORAL at 17:02

## 2018-10-08 RX ADMIN — Medication 325 MILLIGRAM(S): at 14:14

## 2018-10-08 NOTE — PROGRESS NOTE ADULT - REASON FOR ADMISSION
Low Hemoglobin

## 2018-10-08 NOTE — PROGRESS NOTE ADULT - NEGATIVE RESPIRATORY AND THORAX SYMPTOMS
no wheezing/no cough/no hemoptysis/no dyspnea
no hemoptysis/no wheezing/no pleuritic chest pain/no dyspnea/no cough

## 2018-10-08 NOTE — PROGRESS NOTE ADULT - PROVIDER SPECIALTY LIST ADULT
Gastroenterology
Heme/Onc
Internal Medicine
Gastroenterology

## 2018-10-08 NOTE — PROGRESS NOTE ADULT - ASSESSMENT
63 Female from home, walks independently, lives with daughter, with PMH of Recurrent SBO's, DVT LUE secondary to PICC line (On Xarelto since March 2018), Chronic B/L leg swelling, Normocytic Anemia (baseline Hb 7.5-8.5), was sent from Dr. Ross as her Hb was low. Admitted for Normocytic anemia.
1. Anemia  2. Positive occult blood in stool  3. No evidence of acute GI bleeding    Suggestions:    1. Monitor H/H closely  2. Transfuse PRBC as needed  3. Protonix daily  4. Obtain EGD and colonoscopy reportes  5. Capsule endoscopy out patient  6. DVT prophylaxis
1. Anemia  2. positive occult blood in stool  3. No evidence of acute GI bleeding    Suggestions:    1. Monitor H/H  2. Transfuse PRBC as needed  3. Protonix daily  4. Upper GI series with small bowel through  5. Avoid NSAID  6. DVT prophylaxis

## 2018-10-08 NOTE — PROGRESS NOTE ADULT - NEGATIVE GASTROINTESTINAL SYMPTOMS
no vomiting/no diarrhea/no abdominal pain/no hematochezia/no nausea/no melena/no jaundice
no hematochezia/no abdominal pain/no melena/no vomiting/no nausea/no diarrhea

## 2018-10-08 NOTE — PROGRESS NOTE ADULT - GASTROINTESTINAL DETAILS
no rebound tenderness/no rigidity/no guarding/soft/bowel sounds normal/nontender/no distention
nontender/bowel sounds normal/soft/no distention/no guarding/no rebound tenderness/no rigidity

## 2018-10-08 NOTE — PROGRESS NOTE ADULT - RS GEN PE MLT RESP DETAILS PC
good air movement/breath sounds equal/airway patent
airway patent/breath sounds equal/good air movement

## 2018-10-08 NOTE — PROGRESS NOTE ADULT - SUBJECTIVE AND OBJECTIVE BOX
HPI:  feel fine  no sob, pain,   had 2 unit of PRBC  Hb 8.1 now  ROS:  Negative except for:    MEDICATIONS  (STANDING):  cyanocobalamin Injectable 1000 MICROGram(s) IntraMuscular daily  lactated ringers. 1000 milliLiter(s) (75 mL/Hr) IV Continuous <Continuous>  pantoprazole  Injectable 40 milliGRAM(s) IV Push daily    MEDICATIONS  (PRN):      Allergies    No Known Allergies    Intolerances        Vital Signs Last 24 Hrs  T(C): 36.5 (05 Oct 2018 15:01), Max: 36.6 (04 Oct 2018 21:11)  T(F): 97.7 (05 Oct 2018 15:01), Max: 97.9 (04 Oct 2018 21:11)  HR: 71 (05 Oct 2018 15:01) (62 - 71)  BP: 98/59 (05 Oct 2018 15:01) (95/59 - 98/66)  BP(mean): --  RR: 16 (05 Oct 2018 15:01) (16 - 17)  SpO2: 100% (05 Oct 2018 15:01) (98% - 100%)    PHYSICAL EXAM  General: adult in NAD  HEENT: clear oropharynx, anicteric sclera, pink conjunctiva  Neck: supple  CV: normal S1/S2 with no murmur rubs or gallops  Lungs: positive air movement b/l ant lungs,clear to auscultation, no wheezes, no rales  Abdomen: soft non-tender non-distended, no hepatosplenomegaly  Ext: no clubbing cyanosis or edema  Skin: no rashes and no petechiae  Neuro: alert and oriented X 4, no focal deficits  LABS:                          8.1    5.9   )-----------( 430      ( 05 Oct 2018 07:32 )             26.4         Mean Cell Volume : 84.5 fl  Mean Cell Hemoglobin : 25.7 pg  Mean Cell Hemoglobin Concentration : 30.4 gm/dL  Auto Neutrophil # : 3.6 K/uL  Auto Lymphocyte # : 1.6 K/uL  Auto Monocyte # : 0.6 K/uL  Auto Eosinophil # : 0.1 K/uL  Auto Basophil # : 0.1 K/uL  Auto Neutrophil % : 60.5 %  Auto Lymphocyte % : 26.9 %  Auto Monocyte % : 10.1 %  Auto Eosinophil % : 1.1 %  Auto Basophil % : 1.4 %    Serial CBC  Hematocrit 26.4  Hemoglobin 8.1  Plat 430  RBC 3.13  WBC 5.9  Serial CBC  Hematocrit 24.9  Hemoglobin 7.6  Plat 415  RBC 2.97  WBC 5.8  Serial CBC  Hematocrit --  Hemoglobin --  Plat --  RBC 3.07  WBC --  Serial CBC  Hematocrit 28.3  Hemoglobin 8.3  Plat 462  RBC 3.25  WBC 5.3  Serial CBC  Hematocrit 22.2  Hemoglobin 6.3  Plat 483  RBC 2.59  WBC 5.5    10-05    140  |  109<H>  |  17  ----------------------------<  72  4.6   |  25  |  0.82    Ca    8.3<L>      05 Oct 2018 07:32  Phos  2.9     10-05  Mg     1.8     10-05    TPro  5.5<L>  /  Alb  2.4<L>  /  TBili  0.5  /  DBili  x   /  AST  14  /  ALT  28  /  AlkPhos  58  10-05          Ferritin, Serum: 158 ng/mL (10-05 @ 09:21)  Reticulocyte Percent: 3.1 % (10-04 @ 13:50)  Vitamin B12, Serum: 343 pg/mL (10-04 @ 13:50)  Vitamin B12, Serum: 359 pg/mL (10-04 @ 01:40)  Folate, Serum: >20.0 ng/mL (10-04 @ 01:40)  Iron - Total Binding Capacity.: 381 ug/dL (10-03 @ 15:10)            BLOOD SMEAR INTERPRETATION:       RADIOLOGY & ADDITIONAL STUDIES:< from: US Duplex Venous Upper Ext Ltd, Left (10.05.18 @ 16:03) >  INTERPRETATION:  HISTORY:  Left upper extremity swelling; history of   prior left upper extremity DVT.    COMPARISON:  April 10, 2018    FINDINGS: Real-time ultrasonography of the left upper extremity performed   utilizing color Doppler technique. Multiple images are obtained.     There is no evidence for deep venous thrombosis within the left internal   jugular, subclavian, axillary, brachial and basilic venous segments.   Normal compressibility is identified. Augmentation is noted. Thrombus is   newly identified within the left cephalic vein consistent with   superficial thrombophlebitis.    Impression: No evidence for deep venous thrombosis within the left upper   extremity. Thrombus is nearly identified within the left cephalic vein   consistent with superficial thrombophlebitis.    < end of copied text >
PGY 1 Note discussed with supervising resident and primary attending    Patient is a 63y old  Female who presents with a chief complaint of Low Hemoglobin (05 Oct 2018 16:18)      INTERVAL HPI/OVERNIGHT EVENTS:   Patient feeling much better. no new complains      MEDICATIONS  (STANDING):  cyanocobalamin Injectable 1000 MICROGram(s) IntraMuscular daily  lactated ringers. 1000 milliLiter(s) (75 mL/Hr) IV Continuous <Continuous>  pantoprazole  Injectable 40 milliGRAM(s) IV Push daily    MEDICATIONS  (PRN):      __________________________________________________  REVIEW OF SYSTEMS:    CONSTITUTIONAL: No fever,   EYES: no acute visual disturbances  NECK: No pain or stiffness  RESPIRATORY: No cough; No shortness of breath  CARDIOVASCULAR: No chest pain, no palpitations  GASTROINTESTINAL: No pain. No nausea or vomiting; No diarrhea   NEUROLOGICAL: No headache or numbness, no tremors  MUSCULOSKELETAL: No joint pain, no muscle pain  GENITOURINARY: no dysuria, no frequency, no hesitancy  PSYCHIATRY: no depression , no anxiety  ALL OTHER  ROS negative        Vital Signs Last 24 Hrs  T(C): 36.5 (05 Oct 2018 15:01), Max: 36.6 (04 Oct 2018 21:11)  T(F): 97.7 (05 Oct 2018 15:01), Max: 97.9 (04 Oct 2018 21:11)  HR: 71 (05 Oct 2018 15:01) (62 - 71)  BP: 98/59 (05 Oct 2018 15:01) (95/59 - 98/66)  BP(mean): --  RR: 16 (05 Oct 2018 15:01) (16 - 17)  SpO2: 100% (05 Oct 2018 15:01) (98% - 100%)    ________________________________________________  PHYSICAL EXAM:  GENERAL: NAD  HEENT: Normocephalic;  conjunctivae and sclerae clear; moist mucous membranes;   NECK : supple  CHEST/LUNG: Clear to auscultation bilaterally with good air entry   HEART: S1 S2  regular; no murmurs, gallops or rubs  ABDOMEN: Soft, Nontender, Nondistended; Bowel sounds present  EXTREMITIES: no cyanosis; no edema; no calf tenderness  SKIN: warm and dry; no rash  NERVOUS SYSTEM:  Awake and alert; Oriented  to place, person and time ; no new deficits    _________________________________________________  LABS:                        8.1    5.9   )-----------( 430      ( 05 Oct 2018 07:32 )             26.4     10-05    140  |  109<H>  |  17  ----------------------------<  72  4.6   |  25  |  0.82    Ca    8.3<L>      05 Oct 2018 07:32  Phos  2.9     10-05  Mg     1.8     10-05    TPro  5.5<L>  /  Alb  2.4<L>  /  TBili  0.5  /  DBili  x   /  AST  14  /  ALT  28  /  AlkPhos  58  10-05        CAPILLARY BLOOD GLUCOSE            RADIOLOGY & ADDITIONAL TESTS:    Imaging Personally Reviewed:  YES/NO    Consultant(s) Notes Reviewed:   YES/ No    Care Discussed with Consultants :     Plan of care was discussed with patient and /or primary care giver; all questions and concerns were addressed and care was aligned with patient's wishes.
PGY 1 Note discussed with supervising resident and primary attending    Patient is a 63y old  Female who presents with a chief complaint of Low Hemoglobin (06 Oct 2018 17:45)      INTERVAL HPI/OVERNIGHT EVENTS:   No new complains.       MEDICATIONS  (STANDING):  ferrous    sulfate 325 milliGRAM(s) Oral daily  lactated ringers. 1000 milliLiter(s) (75 mL/Hr) IV Continuous <Continuous>  pantoprazole  Injectable 40 milliGRAM(s) IV Push daily    MEDICATIONS  (PRN):      __________________________________________________  REVIEW OF SYSTEMS:    CONSTITUTIONAL: No fever,   EYES: no acute visual disturbances  NECK: No pain or stiffness  RESPIRATORY: No cough; No shortness of breath  CARDIOVASCULAR: No chest pain, no palpitations  GASTROINTESTINAL: No pain. No nausea or vomiting; No diarrhea   NEUROLOGICAL: No headache or numbness, no tremors  MUSCULOSKELETAL: No joint pain, no muscle pain  GENITOURINARY: no dysuria, no frequency, no hesitancy  PSYCHIATRY: no depression , no anxiety  ALL OTHER  ROS negative        Vital Signs Last 24 Hrs  T(C): 36.6 (06 Oct 2018 21:15), Max: 36.7 (06 Oct 2018 14:47)  T(F): 97.8 (06 Oct 2018 21:15), Max: 98 (06 Oct 2018 14:47)  HR: 78 (06 Oct 2018 21:15) (67 - 83)  BP: 96/61 (06 Oct 2018 21:15) (96/61 - 100/57)  BP(mean): --  RR: 17 (06 Oct 2018 21:15) (15 - 17)  SpO2: 100% (06 Oct 2018 21:15) (100% - 100%)    ________________________________________________  PHYSICAL EXAM:  GENERAL: NAD  HEENT: Normocephalic;  conjunctivae and sclerae clear; moist mucous membranes;   NECK : supple  CHEST/LUNG: Clear to auscultation bilaterally with good air entry   HEART: S1 S2  regular; no murmurs, gallops or rubs  ABDOMEN: Soft, Nontender, Nondistended; Bowel sounds present  EXTREMITIES: no cyanosis; no edema; no calf tenderness  SKIN: warm and dry; no rash  NERVOUS SYSTEM:  Awake and alert; Oriented  to place, person and time ; no new deficits    _________________________________________________  LABS:                        8.4    8.1   )-----------( 410      ( 06 Oct 2018 05:48 )             28.3     10-06    142  |  110<H>  |  24<H>  ----------------------------<  66<L>  4.5   |  26  |  0.81    Ca    8.5      06 Oct 2018 05:48  Phos  2.8     10-06  Mg     1.9     10-06    TPro  5.5<L>  /  Alb  2.4<L>  /  TBili  0.3  /  DBili  x   /  AST  13  /  ALT  27  /  AlkPhos  60  10-06        CAPILLARY BLOOD GLUCOSE            RADIOLOGY & ADDITIONAL TESTS:    Imaging Personally Reviewed:  YES/  < from: US Duplex Venous Upper Ext Ltd, Left (10.05.18 @ 16:03) >  XAM:  US DPLX UPR EXT VEINS LTD LT                            PROCEDURE DATE:  10/05/2018          INTERPRETATION:  HISTORY:  Left upper extremity swelling; history of   prior left upper extremity DVT.    COMPARISON:  April 10, 2018    FINDINGS: Real-time ultrasonography of the left upper extremity performed   utilizing color Doppler technique. Multiple images are obtained.     There is no evidence for deep venous thrombosis within the left internal   jugular, subclavian, axillary, brachial and basilic venous segments.   Normal compressibility is identified. Augmentation is noted. Thrombus is   newly identified within the left cephalic vein consistent with   superficial thrombophlebitis.    Impression: No evidence for deep venous thrombosis within the left upper   extremity. Thrombus is nearly identified within the left cephalic vein   consistent with superficial thrombophlebitis.                KAT JOHNSON   This document has been electronically signed. Oct  5 2018  4:57PM    < end of copied text >      Consultant(s) Notes Reviewed:   YES/ No    Care Discussed with Consultants :     Plan of care was discussed with patient and /or primary care giver; all questions and concerns were addressed and care was aligned with patient's wishes.
PGY 1 Note discussed with supervising resident and primary attending    Patient is a 63y old  Female who presents with a chief complaint of anemia, + occult blood (03 Oct 2018 19:15)      INTERVAL HPI/OVERNIGHT EVENTS:   Patient is seen at the bedside. No new complains. s/p 2 PRBC transfusion     MEDICATIONS  (STANDING):  ferrous    sulfate 325 milliGRAM(s) Oral daily  iron sucrose IVPB 200 milliGRAM(s) IV Intermittent every 24 hours  lactated ringers. 1000 milliLiter(s) (75 mL/Hr) IV Continuous <Continuous>  pantoprazole  Injectable 40 milliGRAM(s) IV Push two times a day    MEDICATIONS  (PRN):      __________________________________________________  REVIEW OF SYSTEMS:    CONSTITUTIONAL: No fever,   EYES: no acute visual disturbances  NECK: No pain or stiffness  RESPIRATORY: No cough; No shortness of breath  CARDIOVASCULAR: No chest pain, no palpitations  GASTROINTESTINAL: No pain. No nausea or vomiting; No diarrhea   NEUROLOGICAL: No headache or numbness, no tremors  MUSCULOSKELETAL: No joint pain, no muscle pain  GENITOURINARY: no dysuria, no frequency, no hesitancy  PSYCHIATRY: no depression , no anxiety  ALL OTHER  ROS negative        Vital Signs Last 24 Hrs  T(C): 36.4 (04 Oct 2018 06:22), Max: 37.3 (03 Oct 2018 23:56)  T(F): 97.5 (04 Oct 2018 06:22), Max: 99.2 (03 Oct 2018 23:56)  HR: 62 (04 Oct 2018 06:22) (59 - 95)  BP: 109/68 (04 Oct 2018 06:22) (87/53 - 109/68)  BP(mean): --  RR: 18 (04 Oct 2018 06:22) (16 - 18)  SpO2: 100% (04 Oct 2018 06:22) (100% - 100%)    ________________________________________________  PHYSICAL EXAM:  GENERAL: NAD  HEENT: Normocephalic;  conjunctivae and sclerae clear; moist mucous membranes;   NECK : supple  CHEST/LUNG: Clear to auscultation bilaterally with good air entry   HEART: S1 S2  regular; no murmurs, gallops or rubs  ABDOMEN: Soft, Nontender, Nondistended; Bowel sounds present  EXTREMITIES: no cyanosis; no edema; no calf tenderness  SKIN: warm and dry; no rash  NERVOUS SYSTEM:  Awake and alert; Oriented  to place, person and time ; no new deficits    _________________________________________________  LABS:                        8.3    5.3   )-----------( 462      ( 04 Oct 2018 10:17 )             28.3     10-04    139  |  108  |  17  ----------------------------<  119<H>  4.3   |  26  |  0.74    Ca    8.4      04 Oct 2018 10:17  Phos  3.0     10-04  Mg     1.9     10-04    TPro  5.8<L>  /  Alb  2.5<L>  /  TBili  0.5  /  DBili  x   /  AST  15  /  ALT  28  /  AlkPhos  59  10-04    PT/INR - ( 03 Oct 2018 15:12 )   PT: 12.4 sec;   INR: 1.13 ratio         PTT - ( 03 Oct 2018 15:12 )  PTT:33.0 sec    CAPILLARY BLOOD GLUCOSE            RADIOLOGY & ADDITIONAL TESTS:    Imaging Personally Reviewed:  YES/NO    Consultant(s) Notes Reviewed:   YES/ No    Care Discussed with Consultants :     Plan of care was discussed with patient and /or primary care giver; all questions and concerns were addressed and care was aligned with patient's wishes.
Patient was seen and examined  Patient is a 63y old  Female who presents with a chief complaint of Low Hemoglobin (05 Oct 2018 15:48)      INTERVAL HPI/OVERNIGHT EVENTS:  T(C): 36.5 (10-05-18 @ 15:01), Max: 36.6 (10-04-18 @ 21:11)  HR: 71 (10-05-18 @ 15:01) (62 - 71)  BP: 98/59 (10-05-18 @ 15:01) (95/59 - 98/66)  RR: 16 (10-05-18 @ 15:01) (16 - 17)  SpO2: 100% (10-05-18 @ 15:01) (98% - 100%)  Wt(kg): --  I&O's Summary      LABS:                        8.1    5.9   )-----------( 430      ( 05 Oct 2018 07:32 )             26.4     10-05    140  |  109<H>  |  17  ----------------------------<  72  4.6   |  25  |  0.82    Ca    8.3<L>      05 Oct 2018 07:32  Phos  2.9     10-05  Mg     1.8     10-05    TPro  5.5<L>  /  Alb  2.4<L>  /  TBili  0.5  /  DBili  x   /  AST  14  /  ALT  28  /  AlkPhos  58  10-05        CAPILLARY BLOOD GLUCOSE        LIPID PANEL  Cholesterol 72  LDL 21  HDL 44  RATIO HDL/Total Cholesterol --  Triglyceride 35            MEDICATIONS  (STANDING):  cyanocobalamin Injectable 1000 MICROGram(s) IntraMuscular daily  lactated ringers. 1000 milliLiter(s) (75 mL/Hr) IV Continuous <Continuous>  pantoprazole  Injectable 40 milliGRAM(s) IV Push daily    MEDICATIONS  (PRN):      RADIOLOGY & ADDITIONAL TESTS:    Imaging Personally Reviewed:  [ ] YES  [ ] NO    REVIEW OF SYSTEMS:  CONSTITUTIONAL: No fever, weight loss, or fatigue  EYES: No eye pain, visual disturbances, or discharge  ENMT:  No difficulty hearing, tinnitus, vertigo; No sinus or throat pain  NECK: No pain or stiffness  BREASTS: No pain, masses, or nipple discharge  RESPIRATORY: No cough, wheezing, chills or hemoptysis; No shortness of breath  CARDIOVASCULAR: No chest pain, palpitations, dizziness, or leg swelling  GASTROINTESTINAL: No abdominal or epigastric pain. No nausea, vomiting, or hematemesis; No diarrhea or constipation. No melena or hematochezia.  GENITOURINARY: No dysuria, frequency, hematuria, or incontinence  NEUROLOGICAL: No headaches, memory loss, loss of strength, numbness, or tremors  SKIN: No itching, burning, rashes, or lesions   LYMPH NODES: No enlarged glands  ENDOCRINE: No heat or cold intolerance; No hair loss  MUSCULOSKELETAL: No joint pain or swelling; No muscle, back, or extremity pain  PSYCHIATRIC: No depression, anxiety, mood swings, or difficulty sleeping  HEME/LYMPH: No easy bruising, or bleeding gums  ALLERY AND IMMUNOLOGIC: No hives or eczema      Consultant(s) Notes Reviewed:  [ ] YES  [ ] NO    PHYSICAL EXAM:  GENERAL: NAD, well-groomed, well-developed  HEAD:  Atraumatic, Normocephalic  EYES: EOMI, PERRLA, conjunctiva and sclera clear  ENMT: No tonsillar erythema, exudates, or enlargement; Moist mucous membranes, Good dentition, No lesions  NECK: Supple, No JVD, Normal thyroid  NERVOUS SYSTEM:  Alert & Oriented X3, Good concentration; Motor Strength 5/5 B/L upper and lower extremities; DTRs 2+ intact and symmetric  CHEST/LUNG: Clear to percussion bilaterally; No rales, rhonchi, wheezing, or rubs  HEART: Regular rate and rhythm; No murmurs, rubs, or gallops  ABDOMEN: Soft, Nontender, Nondistended; Bowel sounds present  EXTREMITIES:  2+ Peripheral Pulses, No clubbing, cyanosis, or edema  LYMPH: No lymphadenopathy noted  SKIN: No rashes or lesions    Care Discussed with Consultants/Other Providers [ x] YES  [ ] NO
Patient was seen and examined  Patient is a 63y old  Female who presents with a chief complaint of Low Hemoglobin (05 Oct 2018 20:02)      INTERVAL HPI/OVERNIGHT EVENTS:  T(C): 36.7 (10-06-18 @ 14:47), Max: 37.1 (10-05-18 @ 21:16)  HR: 83 (10-06-18 @ 14:47) (67 - 83)  BP: 100/57 (10-06-18 @ 14:47) (94/56 - 100/57)  RR: 16 (10-06-18 @ 14:47) (15 - 17)  SpO2: 100% (10-06-18 @ 14:47) (99% - 100%)  Wt(kg): --  I&O's Summary      LABS:                        8.4    8.1   )-----------( 410      ( 06 Oct 2018 05:48 )             28.3     10-06    142  |  110<H>  |  24<H>  ----------------------------<  66<L>  4.5   |  26  |  0.81    Ca    8.5      06 Oct 2018 05:48  Phos  2.8     10-06  Mg     1.9     10-06    TPro  5.5<L>  /  Alb  2.4<L>  /  TBili  0.3  /  DBili  x   /  AST  13  /  ALT  27  /  AlkPhos  60  10-06        CAPILLARY BLOOD GLUCOSE        LIPID PANEL  Cholesterol 72  LDL 21  HDL 44  RATIO HDL/Total Cholesterol --  Triglyceride 35            MEDICATIONS  (STANDING):  lactated ringers. 1000 milliLiter(s) (75 mL/Hr) IV Continuous <Continuous>  pantoprazole  Injectable 40 milliGRAM(s) IV Push daily    MEDICATIONS  (PRN):      RADIOLOGY & ADDITIONAL TESTS:    Imaging Personally Reviewed:  [ ] YES  [ ] NO    REVIEW OF SYSTEMS:  CONSTITUTIONAL: No fever, weight loss, or fatigue  EYES: No eye pain, visual disturbances, or discharge  ENMT:  No difficulty hearing, tinnitus, vertigo; No sinus or throat pain  NECK: No pain or stiffness  BREASTS: No pain, masses, or nipple discharge  RESPIRATORY: No cough, wheezing, chills or hemoptysis; No shortness of breath  CARDIOVASCULAR: No chest pain, palpitations, dizziness, or leg swelling  GASTROINTESTINAL: No abdominal or epigastric pain. No nausea, vomiting, or hematemesis; No diarrhea or constipation. No melena or hematochezia.  GENITOURINARY: No dysuria, frequency, hematuria, or incontinence  NEUROLOGICAL: No headaches, memory loss, loss of strength, numbness, or tremors  SKIN: No itching, burning, rashes, or lesions   LYMPH NODES: No enlarged glands  ENDOCRINE: No heat or cold intolerance; No hair loss  MUSCULOSKELETAL: No joint pain or swelling; No muscle, back, or extremity pain  PSYCHIATRIC: No depression, anxiety, mood swings, or difficulty sleeping  HEME/LYMPH: No easy bruising, or bleeding gums  ALLERY AND IMMUNOLOGIC: No hives or eczema      Consultant(s) Notes Reviewed:  [ ] YES  [ ] NO    PHYSICAL EXAM:  GENERAL: NAD, well-groomed, well-developed  HEAD:  Atraumatic, Normocephalic  EYES: EOMI, PERRLA, conjunctiva and sclera clear  ENMT: No tonsillar erythema, exudates, or enlargement; Moist mucous membranes, Good dentition, No lesions  NECK: Supple, No JVD, Normal thyroid  NERVOUS SYSTEM:  Alert & Oriented X3, Good concentration; Motor Strength 5/5 B/L upper and lower extremities; DTRs 2+ intact and symmetric  CHEST/LUNG: Clear to percussion bilaterally; No rales, rhonchi, wheezing, or rubs  HEART: Regular rate and rhythm; No murmurs, rubs, or gallops  ABDOMEN: Soft, Nontender, Nondistended; Bowel sounds present  EXTREMITIES:  2+ Peripheral Pulses, No clubbing, cyanosis, or edema  LYMPH: No lymphadenopathy noted  SKIN: No rashes or lesions    Care Discussed with Consultants/Other Providers [ x] YES  [ ] NO
[   ] ICU                                          [   ] CCU                                      [  X ] Medical Floor    Patient is comfortable. No new complaints reported, No abdominal pain, N/V, hematemesis, hematochezia, melena, fever, chills, chest pain, SOB, cough or diarrhea reported.    VITALS  Vital Signs Last 24 Hrs  T(C): 36.3 (08 Oct 2018 14:02), Max: 36.8 (07 Oct 2018 21:13)  T(F): 97.3 (08 Oct 2018 14:02), Max: 98.3 (07 Oct 2018 21:13)  HR: 73 (08 Oct 2018 14:02) (59 - 73)  BP: 107/71 (08 Oct 2018 14:02) (95/55 - 107/71)     RR: 14 (08 Oct 2018 14:02) (14 - 17)  SpO2: 100% (08 Oct 2018 14:02) (100% - 100%)       MEDICATIONS  (STANDING):  ferrous    sulfate 325 milliGRAM(s) Oral daily  lactated ringers. 1000 milliLiter(s) (75 mL/Hr) IV Continuous <Continuous>  pantoprazole    Tablet 40 milliGRAM(s) Oral two times a day    MEDICATIONS  (PRN):                            8.3    5.8   )-----------( 383      ( 08 Oct 2018 08:53 )             28.9       10-08    142  |  110<H>  |  22<H>  ----------------------------<  73  5.0   |  26  |  0.79    Ca    8.2<L>      08 Oct 2018 08:53  Phos  3.2     10-08  Mg     1.9     10-08    TPro  5.4<L>  /  Alb  2.3<L>  /  TBili  0.3  /  DBili  x   /  AST  11  /  ALT  24  /  AlkPhos  59  10-08
[   ] ICU                                          [   ] CCU                                      [  X ] Medical Floor    Patient is comfortable. No new complaints reported, No abdominal pain, N/V, hematemesis, hematochezia, melena, fever, chills, chest pain, SOB, cough or diarrhea reported.    VITALS  Vital Signs Last 24 Hrs  T(C): 36.9 (07 Oct 2018 14:09), Max: 36.9 (07 Oct 2018 14:09)  T(F): 98.4 (07 Oct 2018 14:09), Max: 98.4 (07 Oct 2018 14:09)  HR: 75 (07 Oct 2018 14:09) (62 - 78)  BP: 96/66 (07 Oct 2018 14:09) (90/51 - 96/66)   RR: 16 (07 Oct 2018 14:09) (16 - 17)  SpO2: 97% (07 Oct 2018 14:09) (97% - 100%)       MEDICATIONS  (STANDING):  ferrous    sulfate 325 milliGRAM(s) Oral daily  lactated ringers. 1000 milliLiter(s) (75 mL/Hr) IV Continuous <Continuous>  pantoprazole  Injectable 40 milliGRAM(s) IV Push daily    MEDICATIONS  (PRN):                            8.5    6.8   )-----------( 399      ( 07 Oct 2018 07:25 )             28.6       10-07    143  |  111<H>  |  23<H>  ----------------------------<  57<L>  4.6   |  25  |  0.81    Ca    8.2<L>      07 Oct 2018 07:25  Phos  3.3     10-07  Mg     2.0     10-07    TPro  5.4<L>  /  Alb  2.3<L>  /  TBili  0.4  /  DBili  x   /  AST  12  /  ALT  26  /  AlkPhos  58  10-07

## 2018-11-13 NOTE — PROGRESS NOTE ADULT - NEGATIVE ENMT SYMPTOMS
[facility]  General Surgery  Progress Note    Subjective:     History of Present Illness:  No notes on file    Post-Op Info:  Procedure(s) (LRB):  LYMPHADENECTOMY, INGUINAL, Sartorious Flap (Right)   1 Day Post-Op     Interval History: NAEON. Tolerated breakfast. No pain.    Medications:  Continuous Infusions:  Scheduled Meds:   acetaminophen  650 mg Oral Q6H    aspirin  81 mg Oral QAM    enoxaparin  40 mg Subcutaneous Daily    finasteride  5 mg Oral QAM    furosemide  20 mg Oral Every other day    losartan-hydrochlorothiazide 50-12.5 mg  1 tablet Oral QAM    metoprolol tartrate  25 mg Oral QHS    metoprolol tartrate  50 mg Oral QAM    miconazole   Topical (Top) BID    potassium chloride  8 mEq Oral Every other day    potassium chloride  40 mEq Oral Once    potassium, sodium phosphates  2 packet Oral QID (AC & HS)    senna-docusate 8.6-50 mg  1 tablet Oral BID    simvastatin  10 mg Oral QHS    tamsulosin  0.4 mg Oral Daily     PRN Meds:oxyCODONE, oxyCODONE     Review of patient's allergies indicates:  No Known Allergies  Objective:     Vital Signs (Most Recent):  Temp: 98 °F (36.7 °C) (11/13/18 0440)  Pulse: 60 (11/13/18 0440)  Resp: 20 (11/13/18 0440)  BP: (!) 138/58 (11/13/18 0440)  SpO2: 97 % (11/13/18 0440) Vital Signs (24h Range):  Temp:  [97.4 °F (36.3 °C)-98.1 °F (36.7 °C)] 98 °F (36.7 °C)  Pulse:  [53-91] 60  Resp:  [18-20] 20  SpO2:  [94 %-100 %] 97 %  BP: (133-183)/(58-80) 138/58     Weight: 82.1 kg (181 lb)  Body mass index is 24.55 kg/m².    Intake/Output - Last 3 Shifts       11/11 0700 - 11/12 0659 11/12 0700 - 11/13 0659 11/13 0700 - 11/14 0659    P.O.  450 100    I.V. (mL/kg)  1900 (23.1)     Total Intake(mL/kg)  2350 (28.6) 100 (1.2)    Urine (mL/kg/hr)  800 (0.4) 400 (4.4)    Drains  220     Total Output  1020 400    Net  +1330 -300           Urine Occurrence  1 x           Physical Exam   Constitutional: He is oriented to person, place, and time. He appears 
well-developed and well-nourished. No distress.   HENT:   Head: Normocephalic.   Eyes: EOM are normal.   Cardiovascular: Normal rate.   Pulmonary/Chest: Effort normal. No respiratory distress.   Abdominal: Soft. He exhibits no distension. There is no tenderness.   Musculoskeletal:   R inguinal incision c/d/i, TTP appropriate for post op period, minimal swelling, BROOKS in place with SS output   Neurological: He is alert and oriented to person, place, and time.   Skin: Skin is warm and dry.   Nursing note and vitals reviewed.      Significant Labs:  CBC:   Recent Labs   Lab 11/13/18 0420   WBC 9.50   RBC 3.64*   HGB 12.0*   HCT 35.0*   *   MCV 96   MCH 33.0*   MCHC 34.3     CMP:   Recent Labs   Lab 11/13/18 0420      CALCIUM 8.4*   ALBUMIN 3.1*   PROT 5.6*      K 3.5   CO2 22*      BUN 22   CREATININE 1.0   ALKPHOS 88   ALT 20   AST 25   BILITOT 1.0       Significant Diagnostics:  I have reviewed all pertinent imaging results/findings within the past 24 hours.    Assessment/Plan:     * Malignant melanoma of right lower leg    89 y.o. male 1 Day Post-Op for Procedure(s) (LRB):  LYMPHADENECTOMY, INGUINAL, Sartorious Flap (Right)    Reg diet  PO pain control  OOB and ambulate  DVT ppx    Dispo: Discharge today         Porfirio Nolan MD  General Surgery  [facility]  
no ear pain/no dysphagia/no gum bleeding/no throat pain/no nose bleeds/no dry mouth/no hearing difficulty
no hearing difficulty/no ear pain/no nose bleeds/no throat pain/no dysphagia/no dry mouth

## 2019-01-24 ENCOUNTER — INPATIENT (INPATIENT)
Facility: HOSPITAL | Age: 64
LOS: 7 days | Discharge: HOME | DRG: 356 | End: 2019-02-01
Attending: SPECIALIST | Admitting: SPECIALIST
Payer: MEDICAID

## 2019-01-24 ENCOUNTER — TRANSCRIPTION ENCOUNTER (OUTPATIENT)
Age: 64
End: 2019-01-24

## 2019-01-24 VITALS — HEIGHT: 66 IN | WEIGHT: 125 LBS

## 2019-01-24 DIAGNOSIS — Z98.890 OTHER SPECIFIED POSTPROCEDURAL STATES: Chronic | ICD-10-CM

## 2019-01-24 LAB
ALBUMIN SERPL ELPH-MCNC: 1.1 G/DL — LOW (ref 3.5–5)
ALP SERPL-CCNC: 161 U/L — HIGH (ref 40–120)
ALT FLD-CCNC: 37 U/L DA — SIGNIFICANT CHANGE UP (ref 10–60)
ANION GAP SERPL CALC-SCNC: 7 MMOL/L — SIGNIFICANT CHANGE UP (ref 5–17)
APTT BLD: 38 SEC — HIGH (ref 27.5–36.3)
AST SERPL-CCNC: 34 U/L — SIGNIFICANT CHANGE UP (ref 10–40)
BASOPHILS # BLD AUTO: 0.1 K/UL — SIGNIFICANT CHANGE UP (ref 0–0.2)
BASOPHILS NFR BLD AUTO: 1 % — SIGNIFICANT CHANGE UP (ref 0–2)
BILIRUB SERPL-MCNC: 0.2 MG/DL — SIGNIFICANT CHANGE UP (ref 0.2–1.2)
BUN SERPL-MCNC: 16 MG/DL — SIGNIFICANT CHANGE UP (ref 7–18)
CALCIUM SERPL-MCNC: 6.8 MG/DL — LOW (ref 8.4–10.5)
CHLORIDE SERPL-SCNC: 112 MMOL/L — HIGH (ref 96–108)
CO2 SERPL-SCNC: 25 MMOL/L — SIGNIFICANT CHANGE UP (ref 22–31)
CREAT SERPL-MCNC: 0.78 MG/DL — SIGNIFICANT CHANGE UP (ref 0.5–1.3)
EOSINOPHIL # BLD AUTO: 0 K/UL — SIGNIFICANT CHANGE UP (ref 0–0.5)
EOSINOPHIL NFR BLD AUTO: 0.1 % — SIGNIFICANT CHANGE UP (ref 0–6)
GLUCOSE SERPL-MCNC: 90 MG/DL — SIGNIFICANT CHANGE UP (ref 70–99)
HCT VFR BLD CALC: 30.7 % — LOW (ref 34.5–45)
HGB BLD-MCNC: 9 G/DL — LOW (ref 11.5–15.5)
INR BLD: 2.1 RATIO — HIGH (ref 0.88–1.16)
LYMPHOCYTES # BLD AUTO: 19.4 % — SIGNIFICANT CHANGE UP (ref 13–44)
LYMPHOCYTES # BLD AUTO: 2.1 K/UL — SIGNIFICANT CHANGE UP (ref 1–3.3)
MCHC RBC-ENTMCNC: 28.5 PG — SIGNIFICANT CHANGE UP (ref 27–34)
MCHC RBC-ENTMCNC: 29.3 GM/DL — LOW (ref 32–36)
MCV RBC AUTO: 97.1 FL — SIGNIFICANT CHANGE UP (ref 80–100)
MONOCYTES # BLD AUTO: 0.6 K/UL — SIGNIFICANT CHANGE UP (ref 0–0.9)
MONOCYTES NFR BLD AUTO: 5.8 % — SIGNIFICANT CHANGE UP (ref 2–14)
NEUTROPHILS # BLD AUTO: 8.1 K/UL — HIGH (ref 1.8–7.4)
NEUTROPHILS NFR BLD AUTO: 73.7 % — SIGNIFICANT CHANGE UP (ref 43–77)
NT-PROBNP SERPL-SCNC: 133 PG/ML — HIGH (ref 0–125)
OB PNL STL: POSITIVE
PLATELET # BLD AUTO: 368 K/UL — SIGNIFICANT CHANGE UP (ref 150–400)
POTASSIUM SERPL-MCNC: 4.1 MMOL/L — SIGNIFICANT CHANGE UP (ref 3.5–5.3)
POTASSIUM SERPL-SCNC: 4.1 MMOL/L — SIGNIFICANT CHANGE UP (ref 3.5–5.3)
PROT SERPL-MCNC: 5 G/DL — LOW (ref 6–8.3)
PROTHROM AB SERPL-ACNC: 23.8 SEC — HIGH (ref 10–12.9)
RBC # BLD: 3.16 M/UL — LOW (ref 3.8–5.2)
RBC # FLD: 17.9 % — HIGH (ref 10.3–14.5)
SODIUM SERPL-SCNC: 144 MMOL/L — SIGNIFICANT CHANGE UP (ref 135–145)
TROPONIN I SERPL-MCNC: 0.02 NG/ML — SIGNIFICANT CHANGE UP (ref 0–0.04)
WBC # BLD: 11 K/UL — HIGH (ref 3.8–10.5)
WBC # FLD AUTO: 11 K/UL — HIGH (ref 3.8–10.5)

## 2019-01-24 PROCEDURE — 93010 ELECTROCARDIOGRAM REPORT: CPT

## 2019-01-24 PROCEDURE — 71275 CT ANGIOGRAPHY CHEST: CPT | Mod: 26

## 2019-01-24 PROCEDURE — 74177 CT ABD & PELVIS W/CONTRAST: CPT | Mod: 26

## 2019-01-24 RX ORDER — SODIUM CHLORIDE 9 MG/ML
1000 INJECTION INTRAMUSCULAR; INTRAVENOUS; SUBCUTANEOUS ONCE
Qty: 0 | Refills: 0 | Status: COMPLETED | OUTPATIENT
Start: 2019-01-24 | End: 2019-01-24

## 2019-01-24 RX ADMIN — SODIUM CHLORIDE 2000 MILLILITER(S): 9 INJECTION INTRAMUSCULAR; INTRAVENOUS; SUBCUTANEOUS at 21:26

## 2019-01-24 NOTE — ED PROVIDER NOTE - CHPI ED SYMPTOMS NEG
no chest pain, no shortness of breath, no generalized weakness, no abd pain, no urinary Sx, no active rectal bleeding/no chills/no fever

## 2019-01-24 NOTE — ED PROVIDER NOTE - PROGRESS NOTE DETAILS
dw dr aguilar from surg, will see pt. concern for closed loop vs internal hernia on ctap. endorsed to joey ngt placed by surg ICU and surgery evaluated the patient. pt not ICU candidate- to be admitted to surgery. started on heparin at this time with multiple right sided PE- although with GI bleeding, hgb improved from priors. tachycardia resolved at this time.

## 2019-01-24 NOTE — ED PROVIDER NOTE - MEDICAL DECISION MAKING DETAILS
63 F with blood per rectum x today. Pt with GI bleed, likely lower. Concerning given distended abd for mass or other intraabdominal process. Given pt has DVT and upper extremity edema, concern for large clot burden. Will do PE study and reassess.

## 2019-01-24 NOTE — ED PROVIDER NOTE - CARE PLAN
Principal Discharge DX:	Pulmonary thromboembolism  Secondary Diagnosis:	SBO (small bowel obstruction)

## 2019-01-24 NOTE — ED ADULT NURSE NOTE - NSIMPLEMENTINTERV_GEN_ALL_ED
Implemented All Fall with Harm Risk Interventions:  [place] to call system. Call bell, personal items and telephone within reach. Instruct patient to call for assistance. Room bathroom lighting operational. Non-slip footwear when patient is off stretcher. Physically safe environment: no spills, clutter or unnecessary equipment. Stretcher in lowest position, wheels locked, appropriate side rails in place. Provide visual cue, wrist band, yellow gown, etc. Monitor gait and stability. Monitor for mental status changes and reorient to person, place, and time. Review medications for side effects contributing to fall risk. Reinforce activity limits and safety measures with patient and family. Provide visual clues: red socks.

## 2019-01-24 NOTE — ED PROVIDER NOTE - OBJECTIVE STATEMENT
64 y/o female with PMHx of DVT presents to the ED c/o blood per rectum x today. Pt notes today she noticed bright red blood on her toilet paper today while wiping. Pt notes Sx of swelling to L arm and L leg (area of previous DVT). Pt denies chest pain, shortness of breath, generalized weakness, active rectal bleeding, abd pain, fever, chills, urinary Sx, or any other complaints. Pt notes recent travel from [place] within the past month. Pt has recently been on Eliquis and took a morning dose today. NKDA.

## 2019-01-25 DIAGNOSIS — I26.99 OTHER PULMONARY EMBOLISM WITHOUT ACUTE COR PULMONALE: ICD-10-CM

## 2019-01-25 DIAGNOSIS — I82.409 ACUTE EMBOLISM AND THROMBOSIS OF UNSPECIFIED DEEP VEINS OF UNSPECIFIED LOWER EXTREMITY: ICD-10-CM

## 2019-01-25 DIAGNOSIS — K56.609 UNSPECIFIED INTESTINAL OBSTRUCTION, UNSPECIFIED AS TO PARTIAL VERSUS COMPLETE OBSTRUCTION: ICD-10-CM

## 2019-01-25 LAB
ANION GAP SERPL CALC-SCNC: 6 MMOL/L — SIGNIFICANT CHANGE UP (ref 5–17)
APTT BLD: 127.9 SEC — CRITICAL HIGH (ref 27.5–36.3)
APTT BLD: 42.3 SEC — HIGH (ref 27.5–36.3)
APTT BLD: >200 SEC — CRITICAL HIGH (ref 27.5–36.3)
BUN SERPL-MCNC: 16 MG/DL — SIGNIFICANT CHANGE UP (ref 7–18)
CALCIUM SERPL-MCNC: 7 MG/DL — LOW (ref 8.4–10.5)
CHLORIDE SERPL-SCNC: 113 MMOL/L — HIGH (ref 96–108)
CO2 SERPL-SCNC: 25 MMOL/L — SIGNIFICANT CHANGE UP (ref 22–31)
CREAT SERPL-MCNC: 0.52 MG/DL — SIGNIFICANT CHANGE UP (ref 0.5–1.3)
GLUCOSE SERPL-MCNC: 78 MG/DL — SIGNIFICANT CHANGE UP (ref 70–99)
HCT VFR BLD CALC: 29.5 % — LOW (ref 34.5–45)
HCT VFR BLD CALC: 32.8 % — LOW (ref 34.5–45)
HGB BLD-MCNC: 9 G/DL — LOW (ref 11.5–15.5)
HGB BLD-MCNC: 9.4 G/DL — LOW (ref 11.5–15.5)
LACTATE SERPL-SCNC: 1.1 MMOL/L — SIGNIFICANT CHANGE UP (ref 0.7–2)
MCHC RBC-ENTMCNC: 27.9 PG — SIGNIFICANT CHANGE UP (ref 27–34)
MCHC RBC-ENTMCNC: 28.7 GM/DL — LOW (ref 32–36)
MCHC RBC-ENTMCNC: 28.8 PG — SIGNIFICANT CHANGE UP (ref 27–34)
MCHC RBC-ENTMCNC: 30.5 GM/DL — LOW (ref 32–36)
MCV RBC AUTO: 94.3 FL — SIGNIFICANT CHANGE UP (ref 80–100)
MCV RBC AUTO: 97.4 FL — SIGNIFICANT CHANGE UP (ref 80–100)
PLATELET # BLD AUTO: 337 K/UL — SIGNIFICANT CHANGE UP (ref 150–400)
PLATELET # BLD AUTO: 363 K/UL — SIGNIFICANT CHANGE UP (ref 150–400)
POTASSIUM SERPL-MCNC: 3.7 MMOL/L — SIGNIFICANT CHANGE UP (ref 3.5–5.3)
POTASSIUM SERPL-SCNC: 3.7 MMOL/L — SIGNIFICANT CHANGE UP (ref 3.5–5.3)
RBC # BLD: 3.13 M/UL — LOW (ref 3.8–5.2)
RBC # BLD: 3.36 M/UL — LOW (ref 3.8–5.2)
RBC # FLD: 17.4 % — HIGH (ref 10.3–14.5)
RBC # FLD: 17.6 % — HIGH (ref 10.3–14.5)
SODIUM SERPL-SCNC: 144 MMOL/L — SIGNIFICANT CHANGE UP (ref 135–145)
WBC # BLD: 10.8 K/UL — HIGH (ref 3.8–10.5)
WBC # BLD: 9.5 K/UL — SIGNIFICANT CHANGE UP (ref 3.8–10.5)
WBC # FLD AUTO: 10.8 K/UL — HIGH (ref 3.8–10.5)
WBC # FLD AUTO: 9.5 K/UL — SIGNIFICANT CHANGE UP (ref 3.8–10.5)

## 2019-01-25 PROCEDURE — 99223 1ST HOSP IP/OBS HIGH 75: CPT

## 2019-01-25 PROCEDURE — 99285 EMERGENCY DEPT VISIT HI MDM: CPT

## 2019-01-25 PROCEDURE — 37191 INS ENDOVAS VENA CAVA FILTR: CPT

## 2019-01-25 PROCEDURE — 93970 EXTREMITY STUDY: CPT | Mod: 26

## 2019-01-25 PROCEDURE — 99291 CRITICAL CARE FIRST HOUR: CPT

## 2019-01-25 PROCEDURE — 74018 RADEX ABDOMEN 1 VIEW: CPT | Mod: 26

## 2019-01-25 RX ORDER — HEPARIN SODIUM 5000 [USP'U]/ML
4500 INJECTION INTRAVENOUS; SUBCUTANEOUS EVERY 6 HOURS
Qty: 0 | Refills: 0 | Status: DISCONTINUED | OUTPATIENT
Start: 2019-01-25 | End: 2019-01-25

## 2019-01-25 RX ORDER — SODIUM CHLORIDE 9 MG/ML
1000 INJECTION, SOLUTION INTRAVENOUS
Qty: 0 | Refills: 0 | Status: DISCONTINUED | OUTPATIENT
Start: 2019-01-25 | End: 2019-01-25

## 2019-01-25 RX ORDER — HEPARIN SODIUM 5000 [USP'U]/ML
2000 INJECTION INTRAVENOUS; SUBCUTANEOUS EVERY 6 HOURS
Qty: 0 | Refills: 0 | Status: DISCONTINUED | OUTPATIENT
Start: 2019-01-25 | End: 2019-01-25

## 2019-01-25 RX ORDER — PIPERACILLIN AND TAZOBACTAM 4; .5 G/20ML; G/20ML
3.38 INJECTION, POWDER, LYOPHILIZED, FOR SOLUTION INTRAVENOUS ONCE
Qty: 0 | Refills: 0 | Status: DISCONTINUED | OUTPATIENT
Start: 2019-01-25 | End: 2019-01-25

## 2019-01-25 RX ORDER — HEPARIN SODIUM 5000 [USP'U]/ML
INJECTION INTRAVENOUS; SUBCUTANEOUS
Qty: 25000 | Refills: 0 | Status: DISCONTINUED | OUTPATIENT
Start: 2019-01-25 | End: 2019-01-25

## 2019-01-25 RX ORDER — ACETAMINOPHEN 500 MG
1000 TABLET ORAL ONCE
Qty: 0 | Refills: 0 | Status: DISCONTINUED | OUTPATIENT
Start: 2019-01-25 | End: 2019-01-25

## 2019-01-25 RX ORDER — PIPERACILLIN AND TAZOBACTAM 4; .5 G/20ML; G/20ML
3.38 INJECTION, POWDER, LYOPHILIZED, FOR SOLUTION INTRAVENOUS EVERY 8 HOURS
Qty: 0 | Refills: 0 | Status: DISCONTINUED | OUTPATIENT
Start: 2019-01-25 | End: 2019-01-25

## 2019-01-25 RX ORDER — HEPARIN SODIUM 5000 [USP'U]/ML
900 INJECTION INTRAVENOUS; SUBCUTANEOUS
Qty: 25000 | Refills: 0 | Status: DISCONTINUED | OUTPATIENT
Start: 2019-01-25 | End: 2019-01-25

## 2019-01-25 RX ORDER — HEPARIN SODIUM 5000 [USP'U]/ML
4500 INJECTION INTRAVENOUS; SUBCUTANEOUS ONCE
Qty: 0 | Refills: 0 | Status: COMPLETED | OUTPATIENT
Start: 2019-01-25 | End: 2019-01-25

## 2019-01-25 RX ORDER — PANTOPRAZOLE SODIUM 20 MG/1
40 TABLET, DELAYED RELEASE ORAL
Qty: 0 | Refills: 0 | Status: DISCONTINUED | OUTPATIENT
Start: 2019-01-25 | End: 2019-01-25

## 2019-01-25 RX ORDER — HYDROMORPHONE HYDROCHLORIDE 2 MG/ML
0.5 INJECTION INTRAMUSCULAR; INTRAVENOUS; SUBCUTANEOUS
Qty: 0 | Refills: 0 | Status: DISCONTINUED | OUTPATIENT
Start: 2019-01-25 | End: 2019-01-25

## 2019-01-25 RX ADMIN — HEPARIN SODIUM 900 UNIT(S)/HR: 5000 INJECTION INTRAVENOUS; SUBCUTANEOUS at 15:28

## 2019-01-25 RX ADMIN — SODIUM CHLORIDE 150 MILLILITER(S): 9 INJECTION, SOLUTION INTRAVENOUS at 10:15

## 2019-01-25 RX ADMIN — PANTOPRAZOLE SODIUM 40 MILLIGRAM(S): 20 TABLET, DELAYED RELEASE ORAL at 07:24

## 2019-01-25 RX ADMIN — HEPARIN SODIUM 4500 UNIT(S): 5000 INJECTION INTRAVENOUS; SUBCUTANEOUS at 00:58

## 2019-01-25 RX ADMIN — PIPERACILLIN AND TAZOBACTAM 25 GRAM(S): 4; .5 INJECTION, POWDER, LYOPHILIZED, FOR SOLUTION INTRAVENOUS at 15:28

## 2019-01-25 RX ADMIN — HEPARIN SODIUM 1100 UNIT(S)/HR: 5000 INJECTION INTRAVENOUS; SUBCUTANEOUS at 00:59

## 2019-01-25 RX ADMIN — HEPARIN SODIUM 900 UNIT(S)/HR: 5000 INJECTION INTRAVENOUS; SUBCUTANEOUS at 09:40

## 2019-01-25 RX ADMIN — PIPERACILLIN AND TAZOBACTAM 25 GRAM(S): 4; .5 INJECTION, POWDER, LYOPHILIZED, FOR SOLUTION INTRAVENOUS at 07:24

## 2019-01-25 RX ADMIN — HEPARIN SODIUM 0 UNIT(S)/HR: 5000 INJECTION INTRAVENOUS; SUBCUTANEOUS at 08:06

## 2019-01-25 NOTE — CONSULT NOTE ADULT - PROBLEM SELECTOR RECOMMENDATION 2
-Recurrent non provoked DVT/PE; will need IVC filter to prevent another episode of PE.  -Will need hematologic/malignancy evaluation for recurrent VTE.  -Recommend heparin drip before/after the surgery. -Recurrent unprovoked DVT/PE; will need IVC filter to prevent another episode of PE.  -Will need hematologic/malignancy evaluation for recurrent VTE.  -Recommend heparin drip before/after the surgery. -Recurrent unprovoked DVT/PE; will need life long AC  IVC if she can not go on AC  -Will need hematologic/malignancy evaluation for recurrent VTE.  -Recommend heparin drip before/after the surgery.

## 2019-01-25 NOTE — CONSULT NOTE ADULT - SUBJECTIVE AND OBJECTIVE BOX
Askedby gen surg attd to place IVC    filter.  Pt w  b/l LE DVT's (R CFVpatent),  PE  SBO,awaiting likely Ex lap  Started        hep    COmfortable  LE's mildedema S/NT    US/CTreviewed    Cond,options, procedure risks/benefit/implications dw'ed surg attd ,pt, pt's son (tel)  Plan IVC f  Informed consent obtained  Will need AC if safe for existing PE if safe

## 2019-01-25 NOTE — H&P ADULT - NSHPLABSRESULTS_GEN_ALL_CORE
9.0    11.0  )-----------( 368      ( 24 Jan 2019 18:41 )             30.7     01-24    144  |  112<H>  |  16  ----------------------------<  90  4.1   |  25  |  0.78    Ca    6.8<L>      24 Jan 2019 18:41    TPro  5.0<L>  /  Alb  1.1<L>  /  TBili  0.2  /  DBili  x   /  AST  34  /  ALT  37  /  AlkPhos  161<H>  01-24        IMPRESSION:   1. Multifocal acute distal lobar/segmental/subsegmental PE in the right   upper   lobe and right lower lobe.  2. Small bilateral pleural effusions.         < from: CT Angio Chest w/ IV Cont (01.24.19 @ 21:23) >    IMPRESSION:   1. High-grade distal small bowel obstruction with transition point in the   deep   central lower abdomen. There is twisting of a mesenteric vasculature and   bowel   at the transition point which is near a small bowel-small bowel surgical   anastomosis in the lower abdomen, suspicious for internal hernia. The   proximal   end of the dilated obstructed bowel also demonstrate what appears to be a   transition point geographically in the same area at the distal transition   point, potentially representing a bone closed-loop small bowel   obstruction.   Recommend emergent surgical consultation.  2. Inflammatory thickening of the wall of the ascending colon, consistent   with   colitis.   3. Large volume ascites.   4. Incompletely visualized DVT in the left common femoral vein and   visualized   portion of the proximal left femoral vein.   5. Thickening of the wall of the gastric antrum may be due to the   underdistended state of the stomach; however, gastritis is possible.

## 2019-01-25 NOTE — CONSULT NOTE ADULT - SUBJECTIVE AND OBJECTIVE BOX
Patient is a 63y old  Female who presents with a chief complaint of Small bowel obstruction, PE (25 Jan 2019 17:27)      HPI:  63 Female from home with PMH of Recurrent SBO's, s/p exp lap, SB resection,  DVT LUE secondary to PICC line (On Xarelto since March 2018), Xarelto was stopped but left leg swelling developed 1 week ago.  Doppler showed DVT and xarelto was restarted.   c/o rectal bleeding for last 2-3 days.   Pt denies abdominal pain, N/V, fever/chills, dysuria. Last BM yesterday with some stain  on toilet paper.   Pt denies CP, SOB, dizziness, palpitation.  Pt was also admitted in April for low H/H and underwent EGD and Colonoscopy by Dr Chatterjee which was negative for acute bleeding source. She was restarted on her oral anticoagulation for DVT.  CT showed SBO again.  She has been on heparin with PTT >100.  She needs a IVC filter before small intestine surgery.      CTA chest: acute multifocal PE in RLL/RUL  CT abd/Pel: Small bowel obstruction concerning for closed loop      ED Course: NGT was placed  Pt normotensive, tachycardic, SaO2 95% (25 Jan 2019 01:20)       ROS:  Negative except for:    PAST MEDICAL & SURGICAL HISTORY:  Constipation  DVT (deep venous thrombosis)  SBO (small bowel obstruction)  HTN (hypertension)  History of intestinal surgery      SOCIAL HISTORY:    FAMILY HISTORY:  No pertinent family history in first degree relatives      MEDICATIONS  (STANDING):  lactated ringers. 1000 milliLiter(s) (75 mL/Hr) IV Continuous <Continuous>    MEDICATIONS  (PRN):      Allergies    No Known Allergies    Intolerances        Vital Signs Last 24 Hrs  T(C): 36.4 (25 Jan 2019 19:41), Max: 36.7 (25 Jan 2019 08:02)  T(F): 97.6 (25 Jan 2019 19:41), Max: 98 (25 Jan 2019 08:02)  HR: 61 (25 Jan 2019 20:41) (61 - 96)  BP: 104/71 (25 Jan 2019 20:41) (97/58 - 113/76)  BP(mean): --  RR: 15 (25 Jan 2019 20:41) (13 - 19)  SpO2: 100% (25 Jan 2019 20:41) (95% - 100%)    PHYSICAL EXAM  General: adult in NAD  HEENT: clear oropharynx, anicteric sclera, pink conjunctiva  Neck: supple  CV: normal S1/S2 with no murmur rubs or gallops  Lungs: positive air movement b/l ant lungs,clear to auscultation, no wheezes, no rales  Abdomen: soft non-tender non-distended, no hepatosplenomegaly  Ext: no clubbing cyanosis or edema  Skin: no rashes and no petechiae  Neuro: alert and oriented X 4, no focal deficits      LABS:                          9.0    10.8  )-----------( 337      ( 25 Jan 2019 07:11 )             29.5         Mean Cell Volume : 94.3 fl  Mean Cell Hemoglobin : 28.8 pg  Mean Cell Hemoglobin Concentration : 30.5 gm/dL  Auto Neutrophil # : x  Auto Lymphocyte # : x  Auto Monocyte # : x  Auto Eosinophil # : x  Auto Basophil # : x  Auto Neutrophil % : x  Auto Lymphocyte % : x  Auto Monocyte % : x  Auto Eosinophil % : x  Auto Basophil % : x      Serial CBC's  01-25 @ 07:11  Hct-29.5 / Hgb-9.0 / Plat-337 / RBC-3.13 / WBC-10.8  Serial CBC's  01-24 @ 18:41  Hct-30.7 / Hgb-9.0 / Plat-368 / RBC-3.16 / WBC-11.0      01-25    144  |  113<H>  |  16  ----------------------------<  78  3.7   |  25  |  0.52    Ca    7.0<L>      25 Jan 2019 07:11    TPro  5.0<L>  /  Alb  1.1<L>  /  TBili  0.2  /  DBili  x   /  AST  34  /  ALT  37  /  AlkPhos  161<H>  01-24      PT/INR - ( 24 Jan 2019 18:41 )   PT: 23.8 sec;   INR: 2.10 ratio         PTT - ( 25 Jan 2019 14:40 )  PTT:127.9 sec                BLOOD SMEAR INTERPRETATION:       RADIOLOGY & ADDITIONAL STUDIES:< from: CT Angio Chest w/ IV Cont (01.24.19 @ 21:23) >  Technique: Axial multidetector CT images of the chest, abdomen, and   pelvis are acquired following intravenous administration 90 cc   Omnipaque-350 (10 cc discarded). Chest CT is performed according to our   pulmonary embolism protocol.    Comparison: Abdominal CT dated 2/8/2018    Findings:    Chest: Mild ectasia of the ascending aorta at 3.7 cm in diameter. No   evidence for aortic dissection. Aberrant right subclavian artery. Filling   defects are seen in the right upper lobe and right lower lobe segmental   pulmonary arteries, compatible with pulmonary embolism.    Mild bilateral pleural effusions. No pericardial effusion. Mild   cardiomegaly. Apparent distal esophageal mural thickening. No enlarged   mediastinal, hilar, or axillary lymph node.    The trachea and central bronchi appear grossly unremarkable.    Mild bilateral atelectasis. No evidence for pulmonary consolidation.    Abdomen/pelvis: Stable cholelithiasis. Mucosal hyperenhancement of the   gallbladder may be due to contraction or acute cholecystitis. Clinical   correlation is recommended.     Hepatic steatosis.    The common bile duct is not dilated.     Atrophic pancreas.    The spleen, and adrenals appear unremarkable. Tiny hypodense lesions in   the kidneys bilaterally, too small to characterize.    New large ascites in the abdomen and pelvis. Apparent small bowel   anastomosis in the right lower quadrant abdomen. There is small bowel   dilatation with apparent 2 converging transition points in the right   lower quadrant mesentery with associated twisting appearance. Findings   are suggestive of a closed loop small bowel obstruction. Given the large   ascites, associated bowel ischemia cannot be excluded. Segment mural   thickening of the ascending colon  may represent nonspecific colitis or   ischemic colitis. Apparent mural thickening of the stomach may be due to     < end of copied text >  < from: CT Angio Chest w/ IV Cont (01.24.19 @ 21:23) >  underdistention; nonspecific gastritis or gastric cancer cannot be   excluded.     No evidence for free air or enlarged lymph node.    Apparent filling defect in the left common femoral and left superficial   femoral veins, compatible with deep vein thrombosis.    The urinary bladder and uterus appear unremarkable.    Age indeterminate compression fracture at T5 vertebra.    Impression: Positive pulmonary embolism. Apparent filling defect in the   left common femoral and left superficial femoral veins, compatible with   deep vein thrombosis.    Mild bilateral pleural effusions.    Apparent distal esophageal mural thickening. Apparent mural thickening of   the stomach may be due to underdistention; nonspecific gastritis or   gastric cancer cannot be excluded.  EGD may be pursued for further   evaluation.     Stable cholelithiasis. Mucosal hyperenhancement of the gallbladder may be   due to contraction or acute cholecystitis. Clinical correlation is   recommended.    Apparent small bowel anastomosis in the right lower quadrant abdomen.   There is small bowel dilatation with apparent 2 converging transition   points in the right lower quadrant mesentery with associated twisting     < end of copied text >  < from: CT Angio Chest w/ IV Cont (01.24.19 @ 21:23) >  appearance. Findings are suggestive of a closed loop small bowel   obstruction. Given the large ascites, associated bowel ischemia cannot be   excluded. Segment mural thickening of the ascending colon  may represent   nonspecific colitis or ischemic colitis.     Age indeterminate compression fracture at T5 vertebra.    PA MsAle is informed.    A preliminary report was provided by [facility].      < end of copied text >

## 2019-01-25 NOTE — H&P ADULT - PROBLEM SELECTOR PLAN 1
Admit to Surgery/Dr Guerrero  NPO, IVF  NGT to LCWS  Lactate  Serial abdominal exam  Pre-op mode for exp -lap  GI/DVT prophylaxis

## 2019-01-25 NOTE — H&P ADULT - NSHPPHYSICALEXAM_GEN_ALL_CORE
Vital Signs Last 24 Hrs  T(C): 36.7 (24 Jan 2019 17:10), Max: 36.7 (24 Jan 2019 17:10)  T(F): 98 (24 Jan 2019 17:10), Max: 98 (24 Jan 2019 17:10)  HR: 96 (25 Jan 2019 01:10) (96 - 112)  BP: 113/70 (25 Jan 2019 01:10) (113/70 - 119/82)  BP(mean): --  RR: 17 (25 Jan 2019 01:10) (17 - 18)  SpO2: 95% (25 Jan 2019 01:10) (95% - 98%)

## 2019-01-25 NOTE — H&P ADULT - HISTORY OF PRESENT ILLNESS
63 Female from home with PMH of Recurrent SBO's, s/p exp lap, SB resection,  DVT LUE secondary to PICC line (On Xarelto since March 2018), Chronic B/L leg swelling, c/o rectal bleeding for last 2-3 days.   Pt denies abdominal pain, N/V, fever/chills, dysuria. Last BM yesterday with some stain  on toilet paper.   Pt denies CP, SOB, dizziness, palpitation.  Pt was also admitted in April for low H/H and underwent EGD and Colonoscopy by Dr Chatterjee which was negative for acute bleeding source. She was restarted on her oral anticoagulation for DVT.      CTA chest: acute multifocal PE in RLL/RUL  CT abd/Pel: Small bowel obstruction concerning for closed loop      ED Course: NGT was placed  Pt normotensive, tachycardic, SaO2 95%

## 2019-01-25 NOTE — CONSULT NOTE ADULT - PROBLEM SELECTOR RECOMMENDATION 9
-Pt saturates well on room air and not in distress, hemodynamically stable. Not a candidate of tPA.  -Recommend heparin drip before/after the surgery. Though INR is 2.1, patient still thrombosed with full dose of Eliquis (last dose: 1/24/19 in AM).  -Supplemental oxygen  -Will need hematologic/malignancy evaluation for recurrent VTE.  -Will need IVC filter to prevent another episode of PE.  -Pt is not a candidate for ICU. -Pt saturates well on room air and not in distress, hemodynamically stable. Not a candidate of tPA.  -Recommend heparin drip before/after the surgery as last dose of Eliquis was almost 24 hours ago.    -Supplemental oxygen  -Will need hematologic/malignancy evaluation for recurrent VTE.  -Will need IVC filter to prevent another episode of PE if patient can not go back on DOAC or has LE clot and will be off AC if she goes to the OR  -Pt is not a candidate for ICU.  Should have Pulmonary eval  Echo to assess RV

## 2019-01-25 NOTE — CONSULT NOTE ADULT - SUBJECTIVE AND OBJECTIVE BOX
HPI:  64 y/o Female from home, walks independently, lives with daughter, with PMHx of Recurrent SBO, LUE DVT secondary to PICC line (was on Xarelto in March 2018), Chronic B/L leg swelling, Normocytic Anemia (baseline Hb 7.5-8.5) came in to ED due to abdominal distention and vomiting x 1 day. Patient states she has history of multiple DVT's in the past. Initially she was on xeralto for LUE DVT 2/2 to PICC line and completed greater then 6 months of anticoagulation. In October 2018, she was admitted and anticoagulation was held 2/2 to positive FOBT. She was found to have new left cephalic vein thrombosis. She was again discharged on xeralto after GI clearance. Patient now states that she went to her PCP Dr. Ross on 01/14/19 due to left LE swelling, and calf pain. He started her on Eliquis loading and requested for LE US. Doppler US of LE was done outpatient on 01/16/19 which showed LLE DVT. At that time she also notice swelling on her left arm. She remained compliant with Eliquis until now. However since yesterday she had vomiting and abdominal distention with mild abdominal discomfort. In the ED, CT C/A/P showed SBO and cute b/l PE. Medicine team is consulted for further recommendations.     Patient currently denies chest pain, SOB, or cough. She does complaint of mild abdominal discomfort and nausea at this time. She states she had 2 BMs since yesterday with some bright red blood. Denies any significant bleeding. Denies epigastric pain. No fever. No other complaints at this time.    REVIEW OF SYSTEMS:  CONSTITUTIONAL: No fever, weight loss, or fatigue  EYES: No eye pain, visual disturbances, or discharge  ENMT:  No difficulty hearing, tinnitus, vertigo; No sinus or throat pain  NECK: No pain or stiffness  BREASTS: No pain, masses, or nipple discharge  RESPIRATORY: No cough, wheezing, chills or hemoptysis; No shortness of breath  CARDIOVASCULAR: b/l leg swelling  GASTROINTESTINAL: abdominal pain, distention, nausea, and vomiting  GENITOURINARY: No dysuria, frequency, hematuria, or incontinence  NEUROLOGICAL: No headaches, memory loss, loss of strength, numbness, or tremors  SKIN: No itching, burning, rashes, or lesions   LYMPH NODES: No enlarged glands  ENDOCRINE: No heat or cold intolerance; No hair loss  MUSCULOSKELETAL: No joint pain or swelling; No muscle, back, or extremity pain  PSYCHIATRIC: No depression, anxiety, mood swings, or difficulty sleeping        PAST MEDICAL & SURGICAL HISTORY:  Constipation  DVT (deep venous thrombosis)  SBO (small bowel obstruction)  HTN (hypertension)  History of intestinal surgery      ALLERGIES: No Known Allergies      MEDS:  heparin  Infusion.  Unit(s)/Hr IV Continuous <Continuous>      FAMILY HISTORY:  No pertinent family history in first degree relatives      VITALS:  Vital Signs Last 24 Hrs  T(C): 36.7 (24 Jan 2019 17:10), Max: 36.7 (24 Jan 2019 17:10)  T(F): 98 (24 Jan 2019 17:10), Max: 98 (24 Jan 2019 17:10)  HR: 96 (25 Jan 2019 01:10) (96 - 112)  BP: 113/70 (25 Jan 2019 01:10) (113/70 - 119/82)  BP(mean): --  RR: 17 (25 Jan 2019 01:10) (17 - 18)  SpO2: 95% (25 Jan 2019 01:10) (95% - 98%)    GENERAL: NAD  HEAD:  Atraumatic, Normocephalic  EYES: EOMI, PERRLA, conjunctiva and sclera clear  ENMT: Moist mucous membranes  NECK: Supple  NERVOUS SYSTEM:  Alert & Oriented X3  CHEST/LUNG: Clear to auscultation bilaterally; No rales, rhonchi, wheezing, or rubs  HEART: Regular rate and rhythm; No murmurs, rubs, or gallops  ABDOMEN:  distended, mild tenderness, absent bowel sounds  EXTREMITIES:  2+ Peripheral Pulses, pitting edema b/l, left calf tenderness, LUE swelling      LABS/DIAGNOSTIC TESTS:                        9.0    11.0  )-----------( 368      ( 24 Jan 2019 18:41 )             30.7     WBC Count: 11.0 K/uL (01-24 @ 18:41)    01-24    144  |  112<H>  |  16  ----------------------------<  90  4.1   |  25  |  0.78    Ca    6.8<L>      24 Jan 2019 18:41    TPro  5.0<L>  /  Alb  1.1<L>  /  TBili  0.2  /  DBili  x   /  AST  34  /  ALT  37  /  AlkPhos  161<H>  01-24          LIVER FUNCTIONS - ( 24 Jan 2019 18:41 )  Alb: 1.1 g/dL / Pro: 5.0 g/dL / ALK PHOS: 161 U/L / ALT: 37 U/L DA / AST: 34 U/L / GGT: x             PT/INR - ( 24 Jan 2019 18:41 )   PT: 23.8 sec;   INR: 2.10 ratio         PTT - ( 24 Jan 2019 18:41 )  PTT:38.0 sec      < from: CT Angio Chest w/ IV Cont (01.24.19 @ 21:23) >  IMPRESSION:   1. Multifocal acute distal lobar/segmental/subsegmental PE in the right   upper   lobe and right lower lobe.  2. Small bilateral pleural effusions.     < end of copied text >      < from: CT Abdomen and pelvis w/ IV Cont (01.24.19 @ 21:23) >  IMPRESSION:   1. High-grade distal small bowel obstruction with transition point in the   deep   central lower abdomen. There is twisting of a mesenteric vasculature and   bowel   at the transition point which is near a small bowel-small bowel surgical   anastomosis in the lower abdomen, suspicious for internal hernia. The   proximal   end of the dilated obstructed bowel also demonstrate what appears to be a   transition point geographically in the same area at the distal transition   point, potentially representing a bone closed-loop small bowel   obstruction.   Recommend emergent surgical consultation.  2. Inflammatory thickening of the wall of the ascending colon, consistent   with   colitis.   3. Large volume ascites.   4. Incompletely visualized DVT in the left common femoral vein and   visualized   portion of the proximal left femoral vein.   5. Thickening of the wall of the gastric antrum may be due to the   underdistended state of the stomach; however, gastritis is possible.    < end of copied text >

## 2019-01-25 NOTE — CONSULT NOTE ADULT - SUBJECTIVE AND OBJECTIVE BOX
Patient is a 63y old  Female who presents with a chief complaint of distended abdomen.    HPI:   63 Female from home, walks independently, lives with daughter, with PMH of Recurrent SBO's, DVT LUE secondary to PICC line (Was on Xarelto March 2018), Left cephalic vein superficial thrombophlebitis, Chronic B/L leg swelling, and Normocytic Anemia (baseline Hb 7.5-8.5), presented to ED c/o worsening abdominal distention and BRBPR with small amount of blood x2 times today. Pt also reports left leg and arm swelling about a week or 2. Pt was admitted to [facility] in 10/2018 for anemia and was discharged with Xarelto, however she could not take Xarelto due to insurance problem. She visited PMD on 1/14/19 due to left leg swelling and was recommended take Eliquis instead. On around 1/16/19, she found left arm gets more swollen however did not seek medical help that time. Today, she found her abdomen was more distended so presented to ED for further evaluation. Pt denies chest pain, SOB, dizziness, abdominal pain, N/V, or any other symptoms. She had small amount of BRBPR and had loose bowel movements.     In ED, pt's VS -119/[id number] on room air, patient is not in distress, labs stable from last discharge, INR 2.1. CT abdomen shows high-grade distal small bowel obstruction with transition point in the deep central lower abdomen. There is twisting of a mesenteric vasculature and bowel at the transition point which is near a small bowel-small bowel surgical anastomosis in the lower abdomen, suspicious for internal hernia. The proximal end of the dilated obstructed bowel also demonstrate what appears to be a transition point geographically in the same area at the distal transition point, potentially representing a bone closed-loop small bowel obstruction. Recommend emergent surgical consultation. CT angio chest showed multifocal acute distal lobar/segmental/subsegmental PE in the right upper lobe and right lower lobe.    ICU was consulted for further evaluation.              PAST MEDICAL & SURGICAL HISTORY:  Constipation  DVT (deep venous thrombosis)  SBO (small bowel obstruction)  HTN (hypertension)  History of intestinal surgery    Allergies    No Known Allergies    Intolerances      FAMILY HISTORY:  No pertinent family history in first degree relatives        Review of Systems:  CONSTITUTIONAL: No fever, chills, or fatigue  EYES: No eye pain, visual disturbances, or discharge  ENMT:  No difficulty hearing, tinnitus, vertigo; No sinus or throat pain  NECK: No pain or stiffness  RESPIRATORY: No cough, wheezing, chills or hemoptysis; No shortness of breath  CARDIOVASCULAR: No chest pain, palpitations, dizziness, or leg swelling  GASTROINTESTINAL: No abdominal or epigastric pain. No nausea, vomiting, or hematemesis; No diarrhea or constipation. No melena or hematochezia. + Abdominal distention  GENITOURINARY: No dysuria, frequency, hematuria, or incontinence  NEUROLOGICAL: No headaches, memory loss, loss of strength, numbness, or tremors  SKIN: No itching, burning, rashes, or lesions         Medications:  heparin  Infusion.  Unit(s)/Hr IV Continuous <Continuous>          Vital Signs Last 24 Hrs  T(C): 36.7 (24 Jan 2019 17:10), Max: 36.7 (24 Jan 2019 17:10)  T(F): 98 (24 Jan 2019 17:10), Max: 98 (24 Jan 2019 17:10)  HR: 96 (25 Jan 2019 01:10) (96 - 112)  BP: 113/70 (25 Jan 2019 01:10) (113/70 - 119/82)  BP(mean): --  RR: 17 (25 Jan 2019 01:10) (17 - 18)  SpO2: 95% (25 Jan 2019 01:10) (95% - 98%)              LABS:                        9.0    11.0  )-----------( 368      ( 24 Jan 2019 18:41 )             30.7     01-24    144  |  112<H>  |  16  ----------------------------<  90  4.1   |  25  |  0.78    Ca    6.8<L>      24 Jan 2019 18:41    TPro  5.0<L>  /  Alb  1.1<L>  /  TBili  0.2  /  DBili  x   /  AST  34  /  ALT  37  /  AlkPhos  161<H>  01-24      CARDIAC MARKERS ( 24 Jan 2019 18:41 )  0.018 ng/mL / x     / x     / x     / x          CAPILLARY BLOOD GLUCOSE        PT/INR - ( 24 Jan 2019 18:41 )   PT: 23.8 sec;   INR: 2.10 ratio         PTT - ( 24 Jan 2019 18:41 )  PTT:38.0 sec            Physical Examination:    General: No acute distress.      HEENT: Pupils equal, reactive to light.  Symmetric.    PULM: Basal fine rales bilaterally, no significant sputum production    CVS: Regular rate and rhythm, no murmurs, rubs, or gallops    ABD: Soft, distended, nontender, hypomoactive bowel sounds, no masses    EXT: No edema, nontender    SKIN: Warm and well perfused, no rashes noted.    NEURO: Alert, oriented, interactive, nonfocal          RADIOLOGY REVIEWED: As above.    CRITICAL CARE TIME SPENT: 35 minutes Patient is a 63y old  Female who presents with a chief complaint of distended abdomen.    HPI:   63 Female from home, walks independently, lives with daughter, with PMH of Recurrent SBO's, DVT LUE secondary to PICC line (Was on Xarelto March 2018), Left cephalic vein superficial thrombophlebitis, Chronic B/L leg swelling, and Normocytic Anemia (baseline Hb 7.5-8.5), presented to ED c/o worsening abdominal distention and BRBPR with small amount of blood x2 times today. Pt also reports left leg and arm swelling about a week or 2. Pt was admitted to [facility] in 10/2018 for anemia and was discharged with Xarelto, however she could not take Xarelto due to insurance problem. She visited PMD on 1/14/19 due to left leg swelling and was recommended take Eliquis instead. On around 1/16/19, she found left arm gets more swollen however did not seek medical help that time. Today, she found her abdomen was more distended so presented to ED for further evaluation. Pt denies chest pain, SOB, dizziness, abdominal pain, N/V, or any other symptoms. She had small amount of BRBPR and had loose bowel movements.     In ED, pt's VS -119/82-18-98 on room air, patient is not in distress, labs stable from last discharge, INR 2.1. CT abdomen shows high-grade distal small bowel obstruction with transition point in the deep central lower abdomen. There is twisting of a mesenteric vasculature and bowel at the transition point which is near a small bowel-small bowel surgical anastomosis in the lower abdomen, suspicious for internal hernia. The proximal end of the dilated obstructed bowel also demonstrate what appears to be a transition point geographically in the same area at the distal transition point, potentially representing a bone closed-loop small bowel obstruction. Recommend emergent surgical consultation. CT angio chest showed multifocal acute distal lobar/segmental/subsegmental PE in the right upper lobe and right lower lobe.    Pt was admitted to the surgery service. ICU was consulted for further evaluation.              PAST MEDICAL & SURGICAL HISTORY:  Constipation  DVT (deep venous thrombosis)  SBO (small bowel obstruction)  HTN (hypertension)  History of intestinal surgery    Allergies    No Known Allergies    Intolerances      FAMILY HISTORY:  No pertinent family history in first degree relatives        Review of Systems:  CONSTITUTIONAL: No fever, chills, or fatigue  EYES: No eye pain, visual disturbances, or discharge  ENMT:  No difficulty hearing, tinnitus, vertigo; No sinus or throat pain  NECK: No pain or stiffness  RESPIRATORY: No cough, wheezing, chills or hemoptysis; No shortness of breath  CARDIOVASCULAR: No chest pain, palpitations, dizziness, or leg swelling  GASTROINTESTINAL: No abdominal or epigastric pain. No nausea, vomiting, or hematemesis; No diarrhea or constipation. No melena or hematochezia. + Abdominal distention  GENITOURINARY: No dysuria, frequency, hematuria, or incontinence  NEUROLOGICAL: No headaches, memory loss, loss of strength, numbness, or tremors  SKIN: No itching, burning, rashes, or lesions         Medications:  heparin  Infusion.  Unit(s)/Hr IV Continuous <Continuous>          Vital Signs Last 24 Hrs  T(C): 36.7 (24 Jan 2019 17:10), Max: 36.7 (24 Jan 2019 17:10)  T(F): 98 (24 Jan 2019 17:10), Max: 98 (24 Jan 2019 17:10)  HR: 96 (25 Jan 2019 01:10) (96 - 112)  BP: 113/70 (25 Jan 2019 01:10) (113/70 - 119/82)  BP(mean): --  RR: 17 (25 Jan 2019 01:10) (17 - 18)  SpO2: 95% (25 Jan 2019 01:10) (95% - 98%)              LABS:                        9.0    11.0  )-----------( 368      ( 24 Jan 2019 18:41 )             30.7     01-24    144  |  112<H>  |  16  ----------------------------<  90  4.1   |  25  |  0.78    Ca    6.8<L>      24 Jan 2019 18:41    TPro  5.0<L>  /  Alb  1.1<L>  /  TBili  0.2  /  DBili  x   /  AST  34  /  ALT  37  /  AlkPhos  161<H>  01-24      CARDIAC MARKERS ( 24 Jan 2019 18:41 )  0.018 ng/mL / x     / x     / x     / x          CAPILLARY BLOOD GLUCOSE        PT/INR - ( 24 Jan 2019 18:41 )   PT: 23.8 sec;   INR: 2.10 ratio         PTT - ( 24 Jan 2019 18:41 )  PTT:38.0 sec            Physical Examination:    General: No acute distress.      HEENT: Pupils equal, reactive to light.  Symmetric.    PULM: Basal fine rales bilaterally, no significant sputum production    CVS: Regular rate and rhythm, no murmurs, rubs, or gallops    ABD: Soft, distended, nontender, hypomoactive bowel sounds, no masses    EXT: No edema, nontender    SKIN: Warm and well perfused, no rashes noted.    NEURO: Alert, oriented, interactive, nonfocal          RADIOLOGY REVIEWED: As above.    CRITICAL CARE TIME SPENT: 35 minutes

## 2019-01-25 NOTE — PROGRESS NOTE ADULT - ATTENDING COMMENTS
As above,  Difficult situation with patient with documented pulmonary emboli and possible SBO.       Currently afebrile, comfortable, no complaints of abd pain suggestive of ischemia     Abd  distended, tympanitic but no peritoneal signs.    NGT draining minimal amount     WBC 10,800  lactate WNL      Imp  No evidence of intra-abdominal emergency at present.  has PE .  In the past she has had DVT of             LUE  Plan  Will obtain duplex of legs and arms,  If DVT of LE's  would be a candidate for IVC filter in light of possible need for surgery.  Will observe closely for now

## 2019-01-25 NOTE — CONSULT NOTE ADULT - ASSESSMENT
63 year old lady with multiple SBO surgery had RUE DVT due to PICC line s/p 3 months of xarelto presented with DVT at left leg and PE.  She was put on xarelto.  Some rectal bleeding and recurrent SBO or ischemia developed.  She has been on heparin drip.  The PTT was >100.  IVC filter was placed.
62 y/o Female from home, walks independently, lives with daughter, with PMHx of Recurrent SBO, LUE DVT secondary to PICC line (was on Xarelto in March 2018), Chronic B/L leg swelling, Normocytic Anemia (baseline Hb 7.5-8.5) came in to ED due to abdominal distention and vomiting x 1 day. Patient states she has history of multiple DVT's in the past. Initially she was on xeralto for LUE DVT 2/2 to PICC line and completed greater then 6 months of anticoagulation. In October 2018, she was admitted and anticoagulation was held 2/2 to positive FOBT. She was found to have new left cephalic vein thrombosis. She was again discharged on xeralto after GI clearance. Patient now states that she went to her PCP Dr. Ross on 01/14/19 due to left LE swelling, and calf pain. He started her on Eliquis loading and requested for LE US. Doppler US of LE was done outpatient on 01/16/19 which showed LLE DVT. At that time she also notice swelling on her left arm. She remained compliant with Eliquis until now. However since yesterday she had vomiting and abdominal distention with mild abdominal discomfort. Patient complaints of 2 BMs since yesterday with some bright red blood.  In the ED, CT C/A/P showed SBO and cute b/l PE. Medicine team is consulted for further recommendations.     1. Acute PE:  Patient is currently hemodynamically stable, no respiratory distress  - start heparin drip  - consider repeating hypercoagulable work up  - r/o May-Thurner syndrome given history of multiple DVTs in left lower extremity through CTA?  - patient will likely need IVC filter in long run  - consider hem/onc consult in AM  - monitor closely at this time    2. BRBPR:  As per chart, baseline hemoglobin around 7.5-8.5  Hb- 9 on admission  - Monitor CBC q8hr  - start protonix    3. SBO:  - NPO  - NGT  - Signs of colitis on ascending colon- c/w zosyn  - f/u blood cx  - management as per surgery
63 Female from home, walks independently, lives with daughter, with PMH of Recurrent SBO's, DVT LUE secondary to PICC line (Was on Xarelto March 2018), Left cephalic vein superficial thrombophlebitis, Chronic B/L leg swelling, and Normocytic Anemia (baseline Hb 7.5-8.5), presented to ED c/o worsening abdominal distention and BRBPR with small amount of blood x2 times today.  CT abdomen showed high-grade distal small bowel obstruction with transition point in the deep central lower abdomen. There is twisting of a mesenteric vasculature and bowel at the transition point which is near a small bowel-small bowel surgical anastomosis in the lower abdomen, suspicious for internal hernia. The proximal end of the dilated obstructed bowel also demonstrate what appears to be a transition point geographically in the same area at the distal transition point, potentially representing a bone closed-loop small bowel obstruction. Recommend emergent surgical consultation. CT angio chest showed multifocal acute distal lobar/segmental/subsegmental PE in the right upper lobe and right lower lobe.    Pt was admitted to the surgery service. ICU was consulted for further evaluation.

## 2019-01-25 NOTE — CONSULT NOTE ADULT - PROBLEM SELECTOR RECOMMENDATION 9
new DVT left leg without apparent provoking risk factor.  PE was found today.    Her ESR has been always very high.  No other hypercoag factor found.  agree to continue heparin for now prior to small bowel surgery.  keep PTT 60-80 new DVT left leg without apparent provoking risk factor.  PE was found today.    Her ESR has been always very high.  will work on hypercoagulable factors.  agree to continue heparin for now prior to small bowel surgery.  keep PTT 60-80

## 2019-01-25 NOTE — H&P ADULT - ASSESSMENT
62 y/o woman with multiple comorbidities, including DVT, recurrent Small bowel obstruction, now presents with acute multifocal PE inn RLL/RUL, Small bowel obstruction concerning for closed loop obstruction

## 2019-01-26 LAB
ALBUMIN SERPL ELPH-MCNC: 1.1 G/DL — LOW (ref 3.5–5)
ALP SERPL-CCNC: 151 U/L — HIGH (ref 40–120)
ALT FLD-CCNC: 30 U/L DA — SIGNIFICANT CHANGE UP (ref 10–60)
ANION GAP SERPL CALC-SCNC: 9 MMOL/L — SIGNIFICANT CHANGE UP (ref 5–17)
APTT BLD: 92 SEC — HIGH (ref 27.5–36.3)
AST SERPL-CCNC: 22 U/L — SIGNIFICANT CHANGE UP (ref 10–40)
BILIRUB SERPL-MCNC: 0.3 MG/DL — SIGNIFICANT CHANGE UP (ref 0.2–1.2)
BUN SERPL-MCNC: 14 MG/DL — SIGNIFICANT CHANGE UP (ref 7–18)
CALCIUM SERPL-MCNC: 6.5 MG/DL — CRITICAL LOW (ref 8.4–10.5)
CHLORIDE SERPL-SCNC: 112 MMOL/L — HIGH (ref 96–108)
CO2 SERPL-SCNC: 24 MMOL/L — SIGNIFICANT CHANGE UP (ref 22–31)
CREAT SERPL-MCNC: 0.56 MG/DL — SIGNIFICANT CHANGE UP (ref 0.5–1.3)
GLUCOSE SERPL-MCNC: 105 MG/DL — HIGH (ref 70–99)
HCT VFR BLD CALC: 33 % — LOW (ref 34.5–45)
HCT VFR BLD CALC: 33.3 % — LOW (ref 34.5–45)
HGB BLD-MCNC: 9.6 G/DL — LOW (ref 11.5–15.5)
HGB BLD-MCNC: 9.7 G/DL — LOW (ref 11.5–15.5)
MCHC RBC-ENTMCNC: 28 PG — SIGNIFICANT CHANGE UP (ref 27–34)
MCHC RBC-ENTMCNC: 28.6 PG — SIGNIFICANT CHANGE UP (ref 27–34)
MCHC RBC-ENTMCNC: 28.9 GM/DL — LOW (ref 32–36)
MCHC RBC-ENTMCNC: 29.2 GM/DL — LOW (ref 32–36)
MCV RBC AUTO: 96 FL — SIGNIFICANT CHANGE UP (ref 80–100)
MCV RBC AUTO: 98.8 FL — SIGNIFICANT CHANGE UP (ref 80–100)
NRBC # BLD: SIGNIFICANT CHANGE UP /100 WBCS (ref 0–0)
PLATELET # BLD AUTO: 343 K/UL — SIGNIFICANT CHANGE UP (ref 150–400)
PLATELET # BLD AUTO: 378 K/UL — SIGNIFICANT CHANGE UP (ref 150–400)
POTASSIUM SERPL-MCNC: 3.4 MMOL/L — LOW (ref 3.5–5.3)
POTASSIUM SERPL-SCNC: 3.4 MMOL/L — LOW (ref 3.5–5.3)
PROT SERPL-MCNC: 4.5 G/DL — LOW (ref 6–8.3)
RBC # BLD: 3.34 M/UL — LOW (ref 3.8–5.2)
RBC # BLD: 3.47 M/UL — LOW (ref 3.8–5.2)
RBC # FLD: 17.2 % — HIGH (ref 10.3–14.5)
RBC # FLD: 17.7 % — HIGH (ref 10.3–14.5)
SODIUM SERPL-SCNC: 145 MMOL/L — SIGNIFICANT CHANGE UP (ref 135–145)
WBC # BLD: 10.2 K/UL — SIGNIFICANT CHANGE UP (ref 3.8–10.5)
WBC # BLD: 10.4 K/UL — SIGNIFICANT CHANGE UP (ref 3.8–10.5)
WBC # FLD AUTO: 10.2 K/UL — SIGNIFICANT CHANGE UP (ref 3.8–10.5)
WBC # FLD AUTO: 10.4 K/UL — SIGNIFICANT CHANGE UP (ref 3.8–10.5)

## 2019-01-26 PROCEDURE — 99232 SBSQ HOSP IP/OBS MODERATE 35: CPT

## 2019-01-26 PROCEDURE — 74018 RADEX ABDOMEN 1 VIEW: CPT | Mod: 26

## 2019-01-26 PROCEDURE — 74019 RADEX ABDOMEN 2 VIEWS: CPT | Mod: 26

## 2019-01-26 RX ORDER — PANTOPRAZOLE SODIUM 20 MG/1
40 TABLET, DELAYED RELEASE ORAL
Qty: 0 | Refills: 0 | Status: DISCONTINUED | OUTPATIENT
Start: 2019-01-26 | End: 2019-02-01

## 2019-01-26 RX ORDER — PIPERACILLIN AND TAZOBACTAM 4; .5 G/20ML; G/20ML
3.38 INJECTION, POWDER, LYOPHILIZED, FOR SOLUTION INTRAVENOUS EVERY 8 HOURS
Qty: 0 | Refills: 0 | Status: DISCONTINUED | OUTPATIENT
Start: 2019-01-26 | End: 2019-01-26

## 2019-01-26 RX ORDER — HEPARIN SODIUM 5000 [USP'U]/ML
800 INJECTION INTRAVENOUS; SUBCUTANEOUS
Qty: 25000 | Refills: 0 | Status: DISCONTINUED | OUTPATIENT
Start: 2019-01-26 | End: 2019-01-28

## 2019-01-26 RX ORDER — FUROSEMIDE 40 MG
40 TABLET ORAL ONCE
Qty: 0 | Refills: 0 | Status: COMPLETED | OUTPATIENT
Start: 2019-01-26 | End: 2019-01-26

## 2019-01-26 RX ORDER — CIPROFLOXACIN LACTATE 400MG/40ML
400 VIAL (ML) INTRAVENOUS EVERY 12 HOURS
Qty: 0 | Refills: 0 | Status: DISCONTINUED | OUTPATIENT
Start: 2019-01-26 | End: 2019-01-31

## 2019-01-26 RX ORDER — SODIUM CHLORIDE 9 MG/ML
1000 INJECTION, SOLUTION INTRAVENOUS
Qty: 0 | Refills: 0 | Status: DISCONTINUED | OUTPATIENT
Start: 2019-01-26 | End: 2019-01-29

## 2019-01-26 RX ORDER — SODIUM CHLORIDE 9 MG/ML
1000 INJECTION, SOLUTION INTRAVENOUS
Qty: 0 | Refills: 0 | Status: DISCONTINUED | OUTPATIENT
Start: 2019-01-26 | End: 2019-01-26

## 2019-01-26 RX ORDER — POTASSIUM CHLORIDE 20 MEQ
10 PACKET (EA) ORAL
Qty: 0 | Refills: 0 | Status: COMPLETED | OUTPATIENT
Start: 2019-01-26 | End: 2019-01-26

## 2019-01-26 RX ORDER — CALCIUM GLUCONATE 100 MG/ML
1 VIAL (ML) INTRAVENOUS ONCE
Qty: 0 | Refills: 0 | Status: COMPLETED | OUTPATIENT
Start: 2019-01-26 | End: 2019-01-26

## 2019-01-26 RX ADMIN — HEPARIN SODIUM 800 UNIT(S)/HR: 5000 INJECTION INTRAVENOUS; SUBCUTANEOUS at 02:33

## 2019-01-26 RX ADMIN — Medication 200 MILLIGRAM(S): at 18:37

## 2019-01-26 RX ADMIN — Medication 100 MILLIEQUIVALENT(S): at 11:46

## 2019-01-26 RX ADMIN — Medication 200 GRAM(S): at 10:55

## 2019-01-26 RX ADMIN — Medication 100 MILLIEQUIVALENT(S): at 16:01

## 2019-01-26 RX ADMIN — Medication 40 MILLIGRAM(S): at 18:59

## 2019-01-26 RX ADMIN — HEPARIN SODIUM 800 UNIT(S)/HR: 5000 INJECTION INTRAVENOUS; SUBCUTANEOUS at 10:42

## 2019-01-26 RX ADMIN — PIPERACILLIN AND TAZOBACTAM 25 GRAM(S): 4; .5 INJECTION, POWDER, LYOPHILIZED, FOR SOLUTION INTRAVENOUS at 05:56

## 2019-01-26 RX ADMIN — PANTOPRAZOLE SODIUM 40 MILLIGRAM(S): 20 TABLET, DELAYED RELEASE ORAL at 05:56

## 2019-01-26 RX ADMIN — SODIUM CHLORIDE 150 MILLILITER(S): 9 INJECTION, SOLUTION INTRAVENOUS at 10:59

## 2019-01-26 RX ADMIN — PANTOPRAZOLE SODIUM 40 MILLIGRAM(S): 20 TABLET, DELAYED RELEASE ORAL at 18:36

## 2019-01-26 RX ADMIN — Medication 100 MILLIEQUIVALENT(S): at 14:24

## 2019-01-26 NOTE — PROGRESS NOTE ADULT - ASSESSMENT
64yo female with sbo, hx abdominal surgery, hypokalemia, hypocalcemia    1) keep npo, IVF  2) replete potassium and calcium IVPB  3) f/u PTT  4) f/u am ABDxray  5) oob ambulate encouraged with assistance  6) cont hep drip  7) cont antibx

## 2019-01-26 NOTE — PROGRESS NOTE ADULT - ASSESSMENT
62 y/o Female from home, walks independently, lives with daughter, with PMHx of Recurrent SBO, LUE DVT secondary to PICC line (was on Xarelto in March 2018), Chronic B/L leg swelling, Normocytic Anemia (baseline Hb 7.5-8.5) came in to ED due to abdominal distention and vomiting x 1 day. Patient states she has history of multiple DVT's in the past. Initially she was on xeralto for LUE DVT 2/2 to PICC line and completed greater then 6 months of anticoagulation. In October 2018, she was admitted and anticoagulation was held 2/2 to positive FOBT. She was found to have new left cephalic vein thrombosis. She was again discharged on xeralto after GI clearance. Patient now states that she went to her PCP Dr. Ross on 01/14/19 due to left LE swelling, and calf pain. He started her on Eliquis loading and requested for LE US. Doppler US of LE was done outpatient on 01/16/19 which showed LLE DVT. At that time she also notice swelling on her left arm. She remained compliant with Eliquis until now. However since yesterday she had vomiting and abdominal distention with mild abdominal discomfort. Patient complaints of 2 BMs since yesterday with some bright red blood.  In the ED, CT C/A/P showed SBO and cute b/l PE. Medicine team is consulted for further recommendations.     1. Acute PE:  Patient is currently hemodynamically stable, no respiratory distress   heparin drip  hypercoagulable work u  IVC filter     2. BRBPR:  As per chart, baseline hemoglobin around 7.5-8.5  Hb- 9 on admission  - Monitor CBC q8hr  - start protonix    3. SBO:  - NPO  - NGT  - Signs of colitis on ascending colon- c/w zosyn  - f/u blood cx  - management as per surgery

## 2019-01-26 NOTE — CHART NOTE - NSCHARTNOTEFT_GEN_A_CORE
Pt POD 0 s/p IVCF    R groin dressing, dry, intact, groin soft    Vital Signs Last 24 Hrs  T(C): 36.4 (25 Jan 2019 23:30), Max: 37.1 (25 Jan 2019 22:54)  T(F): 97.5 (25 Jan 2019 23:30), Max: 98.8 (25 Jan 2019 22:54)  HR: 78 (25 Jan 2019 23:30) (61 - 86)  BP: 110/76 (25 Jan 2019 23:30) (97/58 - 118/84)  BP(mean): --  RR: 16 (25 Jan 2019 23:30) (13 - 20)  SpO2: 100% (25 Jan 2019 23:30) (95% - 100%)                          9.4    9.5   )-----------( 363      ( 25 Jan 2019 22:27 )             32.8     stable post-op  hep gtt, groin checks

## 2019-01-26 NOTE — PROGRESS NOTE ADULT - ATTENDING COMMENTS
As above  Patient feels much better, no complaints of abdominal discomfort     Afebrile  VSS stable  no hypotension or tachycardia      Abd  less distended, + tympanitic no tenderness, + flatus      Mild edema of both LE's    WBC 10,000 As above  Patient feels much better, no complaints of abdominal discomfort     Afebrile  VSS stable  no hypotension or tachycardia      Abd  less distended, + tympanitic no tenderness, + flatus      Mild edema of both LE's    WBC 10,000  PTT 92  NGT minimal       Imp  No evidence of any bowel ischemia  Plan  Adequately anticoagulated, IVC filter in place          Obstructive series today

## 2019-01-27 LAB
ANION GAP SERPL CALC-SCNC: 8 MMOL/L — SIGNIFICANT CHANGE UP (ref 5–17)
APTT BLD: 134.5 SEC — CRITICAL HIGH (ref 27.5–36.3)
APTT BLD: 187.1 SEC — CRITICAL HIGH (ref 27.5–36.3)
APTT BLD: 80.8 SEC — HIGH (ref 27.5–36.3)
BUN SERPL-MCNC: 10 MG/DL — SIGNIFICANT CHANGE UP (ref 7–18)
CALCIUM SERPL-MCNC: 6.2 MG/DL — CRITICAL LOW (ref 8.4–10.5)
CHLORIDE SERPL-SCNC: 110 MMOL/L — HIGH (ref 96–108)
CO2 SERPL-SCNC: 25 MMOL/L — SIGNIFICANT CHANGE UP (ref 22–31)
CREAT SERPL-MCNC: 0.6 MG/DL — SIGNIFICANT CHANGE UP (ref 0.5–1.3)
GLUCOSE SERPL-MCNC: 97 MG/DL — SIGNIFICANT CHANGE UP (ref 70–99)
HCT VFR BLD CALC: 29.5 % — LOW (ref 34.5–45)
HGB BLD-MCNC: 8.6 G/DL — LOW (ref 11.5–15.5)
MCHC RBC-ENTMCNC: 28 PG — SIGNIFICANT CHANGE UP (ref 27–34)
MCHC RBC-ENTMCNC: 29 GM/DL — LOW (ref 32–36)
MCV RBC AUTO: 96.6 FL — SIGNIFICANT CHANGE UP (ref 80–100)
PLATELET # BLD AUTO: 342 K/UL — SIGNIFICANT CHANGE UP (ref 150–400)
POTASSIUM SERPL-MCNC: 3.2 MMOL/L — LOW (ref 3.5–5.3)
POTASSIUM SERPL-SCNC: 3.2 MMOL/L — LOW (ref 3.5–5.3)
RBC # BLD: 3.05 M/UL — LOW (ref 3.8–5.2)
RBC # FLD: 18.4 % — HIGH (ref 10.3–14.5)
SODIUM SERPL-SCNC: 143 MMOL/L — SIGNIFICANT CHANGE UP (ref 135–145)
WBC # BLD: 7.4 K/UL — SIGNIFICANT CHANGE UP (ref 3.8–10.5)
WBC # FLD AUTO: 7.4 K/UL — SIGNIFICANT CHANGE UP (ref 3.8–10.5)

## 2019-01-27 PROCEDURE — 99232 SBSQ HOSP IP/OBS MODERATE 35: CPT

## 2019-01-27 PROCEDURE — 74019 RADEX ABDOMEN 2 VIEWS: CPT | Mod: 26

## 2019-01-27 PROCEDURE — 74018 RADEX ABDOMEN 1 VIEW: CPT | Mod: 26

## 2019-01-27 RX ORDER — DEXTROSE MONOHYDRATE, SODIUM CHLORIDE, AND POTASSIUM CHLORIDE 50; .745; 4.5 G/1000ML; G/1000ML; G/1000ML
100 INJECTION, SOLUTION INTRAVENOUS
Qty: 0 | Refills: 0 | Status: DISCONTINUED | OUTPATIENT
Start: 2019-01-27 | End: 2019-01-29

## 2019-01-27 RX ORDER — POTASSIUM CHLORIDE 20 MEQ
10 PACKET (EA) ORAL
Qty: 0 | Refills: 0 | Status: COMPLETED | OUTPATIENT
Start: 2019-01-27 | End: 2019-01-27

## 2019-01-27 RX ORDER — CALCIUM GLUCONATE 100 MG/ML
1 VIAL (ML) INTRAVENOUS ONCE
Qty: 0 | Refills: 0 | Status: COMPLETED | OUTPATIENT
Start: 2019-01-27 | End: 2019-01-27

## 2019-01-27 RX ADMIN — PANTOPRAZOLE SODIUM 40 MILLIGRAM(S): 20 TABLET, DELAYED RELEASE ORAL at 05:22

## 2019-01-27 RX ADMIN — Medication 100 MILLIEQUIVALENT(S): at 13:00

## 2019-01-27 RX ADMIN — Medication 100 MILLIEQUIVALENT(S): at 11:00

## 2019-01-27 RX ADMIN — HEPARIN SODIUM 0 UNIT(S)/HR: 5000 INJECTION INTRAVENOUS; SUBCUTANEOUS at 08:42

## 2019-01-27 RX ADMIN — Medication 200 GRAM(S): at 11:09

## 2019-01-27 RX ADMIN — Medication 100 MILLIEQUIVALENT(S): at 12:00

## 2019-01-27 RX ADMIN — DEXTROSE MONOHYDRATE, SODIUM CHLORIDE, AND POTASSIUM CHLORIDE 100 MILLILITER(S): 50; .745; 4.5 INJECTION, SOLUTION INTRAVENOUS at 23:45

## 2019-01-27 RX ADMIN — Medication 200 MILLIGRAM(S): at 18:03

## 2019-01-27 RX ADMIN — Medication 200 MILLIGRAM(S): at 05:22

## 2019-01-27 RX ADMIN — PANTOPRAZOLE SODIUM 40 MILLIGRAM(S): 20 TABLET, DELAYED RELEASE ORAL at 18:03

## 2019-01-27 RX ADMIN — HEPARIN SODIUM 600 UNIT(S)/HR: 5000 INJECTION INTRAVENOUS; SUBCUTANEOUS at 14:08

## 2019-01-27 NOTE — PROVIDER CONTACT NOTE (CRITICAL VALUE NOTIFICATION) - ACTION/TREATMENT ORDERED:
Heparin Monogram followed as per protocol heparin drip to be stopped for 1Hr. and then resumed at a decreased rate of 6ml/hr rate.

## 2019-01-27 NOTE — PROGRESS NOTE ADULT - ASSESSMENT
64 y/o Female from home, walks independently, lives with daughter, with PMHx of Recurrent SBO, LUE DVT secondary to PICC line (was on Xarelto in March 2018), Chronic B/L leg swelling, Normocytic Anemia (baseline Hb 7.5-8.5) came in to ED due to abdominal distention and vomiting x 1 day. Patient states she has history of multiple DVT's in the past. Initially she was on xeralto for LUE DVT 2/2 to PICC line and completed greater then 6 months of anticoagulation. In October 2018, she was admitted and anticoagulation was held 2/2 to positive FOBT. She was found to have new left cephalic vein thrombosis. She was again discharged on xeralto after GI clearance. Patient now states that she went to her PCP Dr. Ross on 01/14/19 due to left LE swelling, and calf pain. He started her on Eliquis loading and requested for LE US. Doppler US of LE was done outpatient on 01/16/19 which showed LLE DVT. At that time she also notice swelling on her left arm. She remained compliant with Eliquis until now. However since yesterday she had vomiting and abdominal distention with mild abdominal discomfort. Patient complaints of 2 BMs since yesterday with some bright red blood.  In the ED, CT C/A/P showed SBO and cute b/l PE. Medicine team is consulted for further recommendations.     1. Acute PE:  Patient is currently hemodynamically stable, no respiratory distress   heparin drip  hypercoagulable work u  IVC filter     2. BRBPR:  As per chart, baseline hemoglobin around 7.5-8.5  Hb- 9 on admission  - Monitor CBC q8hr  - start protonix    3. SBO:  - NPO  - NGT  - Signs of colitis on ascending colon- c/w zosyn  - f/u blood cx  - management as per surgery  4.hypokalemia replace

## 2019-01-28 LAB
ANION GAP SERPL CALC-SCNC: 9 MMOL/L — SIGNIFICANT CHANGE UP (ref 5–17)
APTT BLD: >200 SEC — CRITICAL HIGH (ref 27.5–36.3)
BUN SERPL-MCNC: 7 MG/DL — SIGNIFICANT CHANGE UP (ref 7–18)
CALCIUM SERPL-MCNC: 6.6 MG/DL — LOW (ref 8.4–10.5)
CHLORIDE SERPL-SCNC: 109 MMOL/L — HIGH (ref 96–108)
CO2 SERPL-SCNC: 24 MMOL/L — SIGNIFICANT CHANGE UP (ref 22–31)
CREAT SERPL-MCNC: 0.53 MG/DL — SIGNIFICANT CHANGE UP (ref 0.5–1.3)
DRVVT 50/50: 34.7 SEC — SIGNIFICANT CHANGE UP
DRVVT SCREEN TO CONFIRM RATIO: SIGNIFICANT CHANGE UP
GLUCOSE SERPL-MCNC: 97 MG/DL — SIGNIFICANT CHANGE UP (ref 70–99)
HCT VFR BLD CALC: 31.5 % — LOW (ref 34.5–45)
HGB BLD-MCNC: 9.2 G/DL — LOW (ref 11.5–15.5)
INR BLD: 1.72 RATIO — HIGH (ref 0.88–1.16)
LA NT DPL PPP QL: 59.5 SEC — SIGNIFICANT CHANGE UP
MCHC RBC-ENTMCNC: 27.9 PG — SIGNIFICANT CHANGE UP (ref 27–34)
MCHC RBC-ENTMCNC: 29.4 GM/DL — LOW (ref 32–36)
MCV RBC AUTO: 94.9 FL — SIGNIFICANT CHANGE UP (ref 80–100)
NORMALIZED SCT PPP-RTO: 0.61 RATIO — SIGNIFICANT CHANGE UP (ref 0–1.16)
NORMALIZED SCT PPP-RTO: SIGNIFICANT CHANGE UP
NRBC # BLD: SIGNIFICANT CHANGE UP /100 WBCS (ref 0–0)
PLATELET # BLD AUTO: 353 K/UL — SIGNIFICANT CHANGE UP (ref 150–400)
POTASSIUM SERPL-MCNC: 3 MMOL/L — LOW (ref 3.5–5.3)
POTASSIUM SERPL-SCNC: 3 MMOL/L — LOW (ref 3.5–5.3)
PROTHROM AB SERPL-ACNC: 19.4 SEC — HIGH (ref 10–12.9)
RBC # BLD: 3.32 M/UL — LOW (ref 3.8–5.2)
RBC # FLD: 17.6 % — HIGH (ref 10.3–14.5)
SODIUM SERPL-SCNC: 142 MMOL/L — SIGNIFICANT CHANGE UP (ref 135–145)
WBC # BLD: 6.2 K/UL — SIGNIFICANT CHANGE UP (ref 3.8–10.5)
WBC # FLD AUTO: 6.2 K/UL — SIGNIFICANT CHANGE UP (ref 3.8–10.5)

## 2019-01-28 PROCEDURE — 74250 X-RAY XM SM INT 1CNTRST STD: CPT | Mod: 26

## 2019-01-28 PROCEDURE — 74018 RADEX ABDOMEN 1 VIEW: CPT | Mod: 26,59

## 2019-01-28 PROCEDURE — 99232 SBSQ HOSP IP/OBS MODERATE 35: CPT

## 2019-01-28 RX ORDER — POTASSIUM CHLORIDE 20 MEQ
10 PACKET (EA) ORAL
Qty: 0 | Refills: 0 | Status: COMPLETED | OUTPATIENT
Start: 2019-01-28 | End: 2019-01-28

## 2019-01-28 RX ORDER — ENOXAPARIN SODIUM 100 MG/ML
60 INJECTION SUBCUTANEOUS EVERY 12 HOURS
Qty: 0 | Refills: 0 | Status: DISCONTINUED | OUTPATIENT
Start: 2019-01-28 | End: 2019-01-31

## 2019-01-28 RX ORDER — MAGNESIUM SULFATE 500 MG/ML
1 VIAL (ML) INJECTION ONCE
Qty: 0 | Refills: 0 | Status: COMPLETED | OUTPATIENT
Start: 2019-01-28 | End: 2019-01-29

## 2019-01-28 RX ADMIN — Medication 200 MILLIGRAM(S): at 07:56

## 2019-01-28 RX ADMIN — PANTOPRAZOLE SODIUM 40 MILLIGRAM(S): 20 TABLET, DELAYED RELEASE ORAL at 17:55

## 2019-01-28 RX ADMIN — DEXTROSE MONOHYDRATE, SODIUM CHLORIDE, AND POTASSIUM CHLORIDE 100 MILLILITER(S): 50; .745; 4.5 INJECTION, SOLUTION INTRAVENOUS at 07:56

## 2019-01-28 RX ADMIN — PANTOPRAZOLE SODIUM 40 MILLIGRAM(S): 20 TABLET, DELAYED RELEASE ORAL at 07:57

## 2019-01-28 RX ADMIN — Medication 100 MILLIEQUIVALENT(S): at 10:46

## 2019-01-28 RX ADMIN — Medication 200 MILLIGRAM(S): at 17:55

## 2019-01-28 RX ADMIN — Medication 100 MILLIEQUIVALENT(S): at 17:55

## 2019-01-28 RX ADMIN — HEPARIN SODIUM 0 UNIT(S)/HR: 5000 INJECTION INTRAVENOUS; SUBCUTANEOUS at 09:30

## 2019-01-28 RX ADMIN — ENOXAPARIN SODIUM 60 MILLIGRAM(S): 100 INJECTION SUBCUTANEOUS at 12:50

## 2019-01-28 NOTE — PROGRESS NOTE ADULT - ATTENDING COMMENTS
As above     Afebrile, comfortable    Abd  soft, minimally distended, + flatus            ml     WBC wnl  PTT >200  Imp  SBO, resolving  Plan  repeat obstructive series, lower Heparin dose As above     Afebrile, comfortable    Abd  soft, minimally distended, + flatus            ml     WBC wnl  PTT >200  Imp  SBO, resolving  Plan  repeat obstructive series,  Heparin d/c'd, begun on therepeutic Lovenox

## 2019-01-28 NOTE — PROGRESS NOTE ADULT - ASSESSMENT
62yo female with sbo, hypokalemia, PE, DVT    1) f/u abd xray  2) replete potassium  3) cont npo  4) ivf   5) cont heparin drip  6) f/u ptt

## 2019-01-29 LAB
ANION GAP SERPL CALC-SCNC: 6 MMOL/L — SIGNIFICANT CHANGE UP (ref 5–17)
BUN SERPL-MCNC: 5 MG/DL — LOW (ref 7–18)
CALCIUM SERPL-MCNC: 6.5 MG/DL — CRITICAL LOW (ref 8.4–10.5)
CHLORIDE SERPL-SCNC: 111 MMOL/L — HIGH (ref 96–108)
CO2 SERPL-SCNC: 25 MMOL/L — SIGNIFICANT CHANGE UP (ref 22–31)
CREAT SERPL-MCNC: 0.5 MG/DL — SIGNIFICANT CHANGE UP (ref 0.5–1.3)
GLUCOSE SERPL-MCNC: 107 MG/DL — HIGH (ref 70–99)
HCT VFR BLD CALC: 29.9 % — LOW (ref 34.5–45)
HGB BLD-MCNC: 8.7 G/DL — LOW (ref 11.5–15.5)
MAGNESIUM SERPL-MCNC: 1.4 MG/DL — LOW (ref 1.6–2.6)
MCHC RBC-ENTMCNC: 27.9 PG — SIGNIFICANT CHANGE UP (ref 27–34)
MCHC RBC-ENTMCNC: 29 GM/DL — LOW (ref 32–36)
MCV RBC AUTO: 96.2 FL — SIGNIFICANT CHANGE UP (ref 80–100)
PHOSPHATE SERPL-MCNC: 2.2 MG/DL — LOW (ref 2.5–4.5)
PLATELET # BLD AUTO: 343 K/UL — SIGNIFICANT CHANGE UP (ref 150–400)
POTASSIUM SERPL-MCNC: 3.2 MMOL/L — LOW (ref 3.5–5.3)
POTASSIUM SERPL-SCNC: 3.2 MMOL/L — LOW (ref 3.5–5.3)
RBC # BLD: 3.11 M/UL — LOW (ref 3.8–5.2)
RBC # FLD: 17.2 % — HIGH (ref 10.3–14.5)
SODIUM SERPL-SCNC: 142 MMOL/L — SIGNIFICANT CHANGE UP (ref 135–145)
WBC # BLD: 5.4 K/UL — SIGNIFICANT CHANGE UP (ref 3.8–10.5)
WBC # FLD AUTO: 5.4 K/UL — SIGNIFICANT CHANGE UP (ref 3.8–10.5)

## 2019-01-29 PROCEDURE — 99232 SBSQ HOSP IP/OBS MODERATE 35: CPT

## 2019-01-29 RX ORDER — POTASSIUM PHOSPHATE, MONOBASIC POTASSIUM PHOSPHATE, DIBASIC 236; 224 MG/ML; MG/ML
15 INJECTION, SOLUTION INTRAVENOUS ONCE
Qty: 0 | Refills: 0 | Status: COMPLETED | OUTPATIENT
Start: 2019-01-29 | End: 2019-01-29

## 2019-01-29 RX ORDER — MAGNESIUM SULFATE 500 MG/ML
1 VIAL (ML) INJECTION ONCE
Qty: 0 | Refills: 0 | Status: COMPLETED | OUTPATIENT
Start: 2019-01-29 | End: 2019-01-29

## 2019-01-29 RX ORDER — DEXTROSE MONOHYDRATE, SODIUM CHLORIDE, AND POTASSIUM CHLORIDE 50; .745; 4.5 G/1000ML; G/1000ML; G/1000ML
100 INJECTION, SOLUTION INTRAVENOUS
Qty: 0 | Refills: 0 | Status: DISCONTINUED | OUTPATIENT
Start: 2019-01-29 | End: 2019-01-31

## 2019-01-29 RX ORDER — POTASSIUM CHLORIDE 20 MEQ
10 PACKET (EA) ORAL
Qty: 0 | Refills: 0 | Status: DISCONTINUED | OUTPATIENT
Start: 2019-01-29 | End: 2019-01-29

## 2019-01-29 RX ORDER — POTASSIUM CHLORIDE 20 MEQ
40 PACKET (EA) ORAL EVERY 4 HOURS
Qty: 0 | Refills: 0 | Status: COMPLETED | OUTPATIENT
Start: 2019-01-29 | End: 2019-01-29

## 2019-01-29 RX ADMIN — Medication 100 GRAM(S): at 09:56

## 2019-01-29 RX ADMIN — Medication 40 MILLIEQUIVALENT(S): at 09:58

## 2019-01-29 RX ADMIN — Medication 200 MILLIGRAM(S): at 17:43

## 2019-01-29 RX ADMIN — Medication 200 MILLIGRAM(S): at 06:18

## 2019-01-29 RX ADMIN — PANTOPRAZOLE SODIUM 40 MILLIGRAM(S): 20 TABLET, DELAYED RELEASE ORAL at 17:43

## 2019-01-29 RX ADMIN — POTASSIUM PHOSPHATE, MONOBASIC POTASSIUM PHOSPHATE, DIBASIC 62.5 MILLIMOLE(S): 236; 224 INJECTION, SOLUTION INTRAVENOUS at 09:57

## 2019-01-29 RX ADMIN — Medication 100 GRAM(S): at 06:18

## 2019-01-29 RX ADMIN — DEXTROSE MONOHYDRATE, SODIUM CHLORIDE, AND POTASSIUM CHLORIDE 100 MILLILITER(S): 50; .745; 4.5 INJECTION, SOLUTION INTRAVENOUS at 17:38

## 2019-01-29 RX ADMIN — DEXTROSE MONOHYDRATE, SODIUM CHLORIDE, AND POTASSIUM CHLORIDE 100 MILLILITER(S): 50; .745; 4.5 INJECTION, SOLUTION INTRAVENOUS at 06:22

## 2019-01-29 RX ADMIN — ENOXAPARIN SODIUM 60 MILLIGRAM(S): 100 INJECTION SUBCUTANEOUS at 06:19

## 2019-01-29 RX ADMIN — ENOXAPARIN SODIUM 60 MILLIGRAM(S): 100 INJECTION SUBCUTANEOUS at 17:43

## 2019-01-29 RX ADMIN — PANTOPRAZOLE SODIUM 40 MILLIGRAM(S): 20 TABLET, DELAYED RELEASE ORAL at 06:19

## 2019-01-29 RX ADMIN — Medication 40 MILLIEQUIVALENT(S): at 14:00

## 2019-01-29 NOTE — PROGRESS NOTE ADULT - ATTENDING COMMENTS
As above     afebrile comfortable    Abd  soft non tender + bm,   ml      Contrast study shows contrast in colon with minimally dilated SB    Imp  SBO, resolving    Plan  NGT removed, begin clear liquids

## 2019-01-29 NOTE — PROGRESS NOTE ADULT - ASSESSMENT
62 y/o Female from home, walks independently, lives with daughter, with PMHx of Recurrent SBO, LUE DVT secondary to PICC line (was on Xarelto in March 2018), Chronic B/L leg swelling, Normocytic Anemia (baseline Hb 7.5-8.5) came in to ED due to abdominal distention and vomiting x 1 day. Patient states she has history of multiple DVT's in the past. Initially she was on xeralto for LUE DVT 2/2 to PICC line and completed greater then 6 months of anticoagulation. In October 2018, she was admitted and anticoagulation was held 2/2 to positive FOBT. She was found to have new left cephalic vein thrombosis. She was again discharged on xeralto after GI clearance. Patient now states that she went to her PCP Dr. Ross on 01/14/19 due to left LE swelling, and calf pain. He started her on Eliquis loading and requested for LE US. Doppler US of LE was done outpatient on 01/16/19 which showed LLE DVT. At that time she also notice swelling on her left arm. She remained compliant with Eliquis until now. However since yesterday she had vomiting and abdominal distention with mild abdominal discomfort. Patient complaints of 2 BMs since yesterday with some bright red blood.  In the ED, CT C/A/P showed SBO and cute b/l PE. Medicine team is consulted for further recommendations.     1. Acute PE:  Patient is currently hemodynamically stable, no respiratory distress   heparin drip  hypercoagulable work u  IVC filter   xarelto once stable     2. BRBPR:  As per chart, baseline hemoglobin around 7.5-8.5  Hb- 9 on admission  - Monitor CBC q8hr  - start protonix    3. SBO:  - NPO  - management as per surgery    4.hypokalemia replace 64 y/o Female from home, walks independently, lives with daughter, with PMHx of Recurrent SBO, LUE DVT secondary to PICC line (was on Xarelto in March 2018), Chronic B/L leg swelling, Normocytic Anemia (baseline Hb 7.5-8.5) came in to ED due to abdominal distention and vomiting x 1 day. Patient states she has history of multiple DVT's in the past. Initially she was on xeralto for LUE DVT 2/2 to PICC line and completed greater then 6 months of anticoagulation. In October 2018, she was admitted and anticoagulation was held 2/2 to positive FOBT. She was found to have new left cephalic vein thrombosis. She was again discharged on xeralto after GI clearance. Patient now states that she went to her PCP Dr. Ross on 01/14/19 due to left LE swelling, and calf pain. He started her on Eliquis loading and requested for LE US. Doppler US of LE was done outpatient on 01/16/19 which showed LLE DVT. At that time she also notice swelling on her left arm. She remained compliant with Eliquis until now. However since yesterday she had vomiting and abdominal distention with mild abdominal discomfort. Patient complaints of 2 BMs since yesterday with some bright red blood.  In the ED, CT C/A/P showed SBO and cute b/l PE. Medicine team is consulted for further recommendations.     1. Acute PE:  Patient is currently hemodynamically stable, no respiratory distress   heparin drip  hypercoagulable work u  IVC filter   xarelto once stable     2. BRBPR:  As per chart, baseline hemoglobin around 7.5-8.5  Hb- 9 on admission  - Monitor CBC q8hr  - start protonix    3. SBO:  clear liquid     4.hypokalemia replace

## 2019-01-29 NOTE — PROGRESS NOTE ADULT - ASSESSMENT
64 y/o Female from home, walks independently, lives with daughter, with PMHx of Recurrent SBO, LUE DVT secondary to PICC line (was on Xarelto in March 2018), Chronic B/L leg swelling, Normocytic Anemia (baseline Hb 7.5-8.5) came in to ED due to abdominal distention and vomiting x 1 day. Patient states she has history of multiple DVT's in the past. Initially she was on xeralto for LUE DVT 2/2 to PICC line and completed greater then 6 months of anticoagulation. In October 2018, she was admitted and anticoagulation was held 2/2 to positive FOBT. She was found to have new left cephalic vein thrombosis. She was again discharged on xeralto after GI clearance. Patient now states that she went to her PCP Dr. Ross on 01/14/19 due to left LE swelling, and calf pain. He started her on Eliquis loading and requested for LE US. Doppler US of LE was done outpatient on 01/16/19 which showed LLE DVT. At that time she also notice swelling on her left arm. She remained compliant with Eliquis until now. However since yesterday she had vomiting and abdominal distention with mild abdominal discomfort. Patient complaints of 2 BMs since yesterday with some bright red blood.  In the ED, CT C/A/P showed SBO and cute b/l PE. Medicine team is consulted for further recommendations.     1. Acute PE:  Patient is currently hemodynamically stable, no respiratory distress   heparin drip  hypercoagulable work u  IVC filter   xarelto once stable     2. BRBPR:  As per chart, baseline hemoglobin around 7.5-8.5  Hb- 9 on admission  - Monitor CBC q8hr  - start protonix    3. SBO:  - NPO  - management as per surgery    4.hypokalemia replace

## 2019-01-30 LAB
ANION GAP SERPL CALC-SCNC: 6 MMOL/L — SIGNIFICANT CHANGE UP (ref 5–17)
BASOPHILS # BLD AUTO: 0.1 K/UL — SIGNIFICANT CHANGE UP (ref 0–0.2)
BASOPHILS NFR BLD AUTO: 1.2 % — SIGNIFICANT CHANGE UP (ref 0–2)
BUN SERPL-MCNC: 4 MG/DL — LOW (ref 7–18)
CALCIUM SERPL-MCNC: 7.1 MG/DL — LOW (ref 8.4–10.5)
CHLORIDE SERPL-SCNC: 111 MMOL/L — HIGH (ref 96–108)
CO2 SERPL-SCNC: 26 MMOL/L — SIGNIFICANT CHANGE UP (ref 22–31)
CREAT SERPL-MCNC: 0.49 MG/DL — LOW (ref 0.5–1.3)
DRVVT 50/50: 36.2 SEC — SIGNIFICANT CHANGE UP
DRVVT SCREEN TO CONFIRM RATIO: SIGNIFICANT CHANGE UP
EOSINOPHIL # BLD AUTO: 0.1 K/UL — SIGNIFICANT CHANGE UP (ref 0–0.5)
EOSINOPHIL NFR BLD AUTO: 1.6 % — SIGNIFICANT CHANGE UP (ref 0–6)
GLUCOSE SERPL-MCNC: 94 MG/DL — SIGNIFICANT CHANGE UP (ref 70–99)
HCT VFR BLD CALC: 29.8 % — LOW (ref 34.5–45)
HGB BLD-MCNC: 8.7 G/DL — LOW (ref 11.5–15.5)
LA NT DPL PPP QL: 67 SEC — SIGNIFICANT CHANGE UP
LYMPHOCYTES # BLD AUTO: 1.8 K/UL — SIGNIFICANT CHANGE UP (ref 1–3.3)
LYMPHOCYTES # BLD AUTO: 26.3 % — SIGNIFICANT CHANGE UP (ref 13–44)
MCHC RBC-ENTMCNC: 27.9 PG — SIGNIFICANT CHANGE UP (ref 27–34)
MCHC RBC-ENTMCNC: 29.1 GM/DL — LOW (ref 32–36)
MCV RBC AUTO: 96 FL — SIGNIFICANT CHANGE UP (ref 80–100)
MONOCYTES # BLD AUTO: 0.5 K/UL — SIGNIFICANT CHANGE UP (ref 0–0.9)
MONOCYTES NFR BLD AUTO: 7.9 % — SIGNIFICANT CHANGE UP (ref 2–14)
NEUTROPHILS # BLD AUTO: 4.2 K/UL — SIGNIFICANT CHANGE UP (ref 1.8–7.4)
NEUTROPHILS NFR BLD AUTO: 63 % — SIGNIFICANT CHANGE UP (ref 43–77)
NORMALIZED SCT PPP-RTO: 0.72 RATIO — SIGNIFICANT CHANGE UP (ref 0–1.16)
NORMALIZED SCT PPP-RTO: SIGNIFICANT CHANGE UP
PLATELET # BLD AUTO: 381 K/UL — SIGNIFICANT CHANGE UP (ref 150–400)
POTASSIUM SERPL-MCNC: 4.2 MMOL/L — SIGNIFICANT CHANGE UP (ref 3.5–5.3)
POTASSIUM SERPL-SCNC: 4.2 MMOL/L — SIGNIFICANT CHANGE UP (ref 3.5–5.3)
RBC # BLD: 3.1 M/UL — LOW (ref 3.8–5.2)
RBC # FLD: 17.1 % — HIGH (ref 10.3–14.5)
SODIUM SERPL-SCNC: 143 MMOL/L — SIGNIFICANT CHANGE UP (ref 135–145)
WBC # BLD: 6.7 K/UL — SIGNIFICANT CHANGE UP (ref 3.8–10.5)
WBC # FLD AUTO: 6.7 K/UL — SIGNIFICANT CHANGE UP (ref 3.8–10.5)

## 2019-01-30 PROCEDURE — 74019 RADEX ABDOMEN 2 VIEWS: CPT | Mod: 26

## 2019-01-30 PROCEDURE — 99232 SBSQ HOSP IP/OBS MODERATE 35: CPT

## 2019-01-30 RX ORDER — FUROSEMIDE 40 MG
20 TABLET ORAL ONCE
Qty: 0 | Refills: 0 | Status: COMPLETED | OUTPATIENT
Start: 2019-01-30 | End: 2019-01-30

## 2019-01-30 RX ADMIN — Medication 200 MILLIGRAM(S): at 06:51

## 2019-01-30 RX ADMIN — PANTOPRAZOLE SODIUM 40 MILLIGRAM(S): 20 TABLET, DELAYED RELEASE ORAL at 17:52

## 2019-01-30 RX ADMIN — Medication 200 MILLIGRAM(S): at 17:51

## 2019-01-30 RX ADMIN — ENOXAPARIN SODIUM 60 MILLIGRAM(S): 100 INJECTION SUBCUTANEOUS at 17:54

## 2019-01-30 RX ADMIN — Medication 20 MILLIGRAM(S): at 08:58

## 2019-01-30 RX ADMIN — ENOXAPARIN SODIUM 60 MILLIGRAM(S): 100 INJECTION SUBCUTANEOUS at 06:52

## 2019-01-30 RX ADMIN — PANTOPRAZOLE SODIUM 40 MILLIGRAM(S): 20 TABLET, DELAYED RELEASE ORAL at 06:52

## 2019-01-30 NOTE — PROGRESS NOTE ADULT - ASSESSMENT
62 y/o Female from home, walks independently, lives with daughter, with PMHx of Recurrent SBO, LUE DVT secondary to PICC line (was on Xarelto in March 2018), Chronic B/L leg swelling, Normocytic Anemia (baseline Hb 7.5-8.5) came in to ED due to abdominal distention and vomiting x 1 day. Patient states she has history of multiple DVT's in the past. Initially she was on xeralto for LUE DVT 2/2 to PICC line and completed greater then 6 months of anticoagulation. In October 2018, she was admitted and anticoagulation was held 2/2 to positive FOBT. She was found to have new left cephalic vein thrombosis. She was again discharged on xeralto after GI clearance. Patient now states that she went to her PCP Dr. Ross on 01/14/19 due to left LE swelling, and calf pain. He started her on Eliquis loading and requested for LE US. Doppler US of LE was done outpatient on 01/16/19 which showed LLE DVT. At that time she also notice swelling on her left arm. She remained compliant with Eliquis until now. However since yesterday she had vomiting and abdominal distention with mild abdominal discomfort. Patient complaints of 2 BMs since yesterday with some bright red blood.  In the ED, CT C/A/P showed SBO and cute b/l PE. Medicine team is consulted for further recommendations.     1. Acute PE:  Patient is currently hemodynamically stable, no respiratory distress   heparin drip  hypercoagulable work u  IVC filter   xarelto once stable   DC PATIENT IN AM ONCE STABLE     2. BRBPR:  As per chart, baseline hemoglobin around 7.5-8.5  Hb- 9 on admission  - Monitor CBC q8hr  - start protonix    3. SBO:  clear liquid advance diet     4.hypokalemia replace

## 2019-01-31 ENCOUNTER — TRANSCRIPTION ENCOUNTER (OUTPATIENT)
Age: 64
End: 2019-01-31

## 2019-01-31 LAB
B2 GLYCOPROT1 AB SER QL: POSITIVE
CARDIOLIPIN AB SER-ACNC: NEGATIVE — SIGNIFICANT CHANGE UP
HCT VFR BLD CALC: 30.8 % — LOW (ref 34.5–45)
HGB BLD-MCNC: 8.9 G/DL — LOW (ref 11.5–15.5)
MCHC RBC-ENTMCNC: 27.2 PG — SIGNIFICANT CHANGE UP (ref 27–34)
MCHC RBC-ENTMCNC: 28.8 GM/DL — LOW (ref 32–36)
MCV RBC AUTO: 94.8 FL — SIGNIFICANT CHANGE UP (ref 80–100)
PLATELET # BLD AUTO: 282 K/UL — SIGNIFICANT CHANGE UP (ref 150–400)
RBC # BLD: 3.25 M/UL — LOW (ref 3.8–5.2)
RBC # FLD: 17.3 % — HIGH (ref 10.3–14.5)
WBC # BLD: 7.8 K/UL — SIGNIFICANT CHANGE UP (ref 3.8–10.5)
WBC # FLD AUTO: 7.8 K/UL — SIGNIFICANT CHANGE UP (ref 3.8–10.5)

## 2019-01-31 PROCEDURE — 99232 SBSQ HOSP IP/OBS MODERATE 35: CPT

## 2019-01-31 RX ORDER — FUROSEMIDE 40 MG
40 TABLET ORAL ONCE
Qty: 0 | Refills: 0 | Status: COMPLETED | OUTPATIENT
Start: 2019-01-31 | End: 2019-01-31

## 2019-01-31 RX ORDER — RIVAROXABAN 15 MG-20MG
15 KIT ORAL
Qty: 0 | Refills: 0 | Status: DISCONTINUED | OUTPATIENT
Start: 2019-01-31 | End: 2019-02-01

## 2019-01-31 RX ADMIN — PANTOPRAZOLE SODIUM 40 MILLIGRAM(S): 20 TABLET, DELAYED RELEASE ORAL at 05:31

## 2019-01-31 RX ADMIN — PANTOPRAZOLE SODIUM 40 MILLIGRAM(S): 20 TABLET, DELAYED RELEASE ORAL at 17:47

## 2019-01-31 RX ADMIN — Medication 200 MILLIGRAM(S): at 05:31

## 2019-01-31 RX ADMIN — RIVAROXABAN 15 MILLIGRAM(S): KIT at 17:47

## 2019-01-31 RX ADMIN — Medication 40 MILLIGRAM(S): at 09:45

## 2019-01-31 RX ADMIN — DEXTROSE MONOHYDRATE, SODIUM CHLORIDE, AND POTASSIUM CHLORIDE 100 MILLILITER(S): 50; .745; 4.5 INJECTION, SOLUTION INTRAVENOUS at 05:31

## 2019-01-31 RX ADMIN — ENOXAPARIN SODIUM 60 MILLIGRAM(S): 100 INJECTION SUBCUTANEOUS at 05:31

## 2019-01-31 NOTE — DISCHARGE NOTE ADULT - CARE PLAN
Principal Discharge DX:	SBO (small bowel obstruction)  Secondary Diagnosis:	DVT (deep venous thrombosis)  Secondary Diagnosis:	Pulmonary thromboembolism Principal Discharge DX:	SBO (small bowel obstruction)  Goal:	resolved  Assessment and plan of treatment:	Diet as tolerated   Follow up with Dr. Guerrero within 1 week  Secondary Diagnosis:	DVT (deep venous thrombosis)  Goal:	continue xarelto 2 times a day  Assessment and plan of treatment:	Please follow up with PCP Dr. Dolan  Secondary Diagnosis:	Pulmonary thromboembolism

## 2019-01-31 NOTE — DISCHARGE NOTE ADULT - CARE PROVIDER_API CALL
Solomon Guerrero (MD)  Surgery  [address] [facility], [place] [address]  Phone: [phone]  Fax: [phone]    Yessica Ross)  Medicine  [address] [place] [address], [place] [address]  Phone: [phone]  Fax: [phone]

## 2019-01-31 NOTE — DISCHARGE NOTE ADULT - HOSPITAL COURSE
63 Female from home with PMH of Recurrent SBO's, s/p exp lap, SB resection,  DVT LUE secondary to PICC line (On Xarelto since March 2018), Xarelto was stopped but left leg swelling developed 1 week ago.  Doppler showed DVT and xarelto was restarted.   c/o rectal bleeding for last 2-3 days.   Pt denies abdominal pain, N/V, fever/chills, dysuria. Last BM yesterday with some stain  on toilet paper.   Pt denies CP, SOB, dizziness, palpitation.  Pt was also admitted in April for low H/H and underwent EGD and Colonoscopy by Dr Chatterjee which was negative for acute bleeding source. She was restarted on her oral anticoagulation for DVT.  CT showed SBO again.  She has been on heparin with PTT >100.  She needs a IVC filter before small intestine surgery.  patient had IVC filter placed on 1/25. Patient was managed conservatively for SBO. Clinically improved. diet was advanced and tolerated   Hematology evaluated patient recommended anticoagulation  Patient was treated with lovenox and then restarted on xarelto   Patient for d/c home with outpatient follow up

## 2019-01-31 NOTE — DISCHARGE NOTE ADULT - PLAN OF CARE
resolved Diet as tolerated   Follow up with Dr. Guerrero within 1 week continue xarelto 2 times a day Please follow up with PCP Dr. Dolan

## 2019-01-31 NOTE — PROGRESS NOTE ADULT - ASSESSMENT
64 yo female with SBO- resolving , DVTs PEs  1- continue diet, if has n/v will get further imaging and make npo   2- diuresis- lasix 40 mg   3- oob/ambulate  4- cont AC

## 2019-02-01 ENCOUNTER — INBOUND DOCUMENT (OUTPATIENT)
Age: 64
End: 2019-02-01

## 2019-02-01 VITALS
SYSTOLIC BLOOD PRESSURE: 113 MMHG | DIASTOLIC BLOOD PRESSURE: 83 MMHG | RESPIRATION RATE: 16 BRPM | TEMPERATURE: 98 F | HEART RATE: 82 BPM | OXYGEN SATURATION: 94 %

## 2019-02-01 LAB
B2 GLYCOPROT1 IGA SER QL: 10.7 SAU — SIGNIFICANT CHANGE UP
B2 GLYCOPROT1 IGG SER-ACNC: <5 SGU — SIGNIFICANT CHANGE UP
B2 GLYCOPROT1 IGM SER-ACNC: <5 SMU — SIGNIFICANT CHANGE UP

## 2019-02-01 PROCEDURE — 85610 PROTHROMBIN TIME: CPT

## 2019-02-01 PROCEDURE — 86901 BLOOD TYPING SEROLOGIC RH(D): CPT

## 2019-02-01 PROCEDURE — 84100 ASSAY OF PHOSPHORUS: CPT

## 2019-02-01 PROCEDURE — 85730 THROMBOPLASTIN TIME PARTIAL: CPT

## 2019-02-01 PROCEDURE — 99285 EMERGENCY DEPT VISIT HI MDM: CPT | Mod: 25

## 2019-02-01 PROCEDURE — 80048 BASIC METABOLIC PNL TOTAL CA: CPT

## 2019-02-01 PROCEDURE — C1889: CPT

## 2019-02-01 PROCEDURE — 83880 ASSAY OF NATRIURETIC PEPTIDE: CPT

## 2019-02-01 PROCEDURE — 74250 X-RAY XM SM INT 1CNTRST STD: CPT

## 2019-02-01 PROCEDURE — C1880: CPT

## 2019-02-01 PROCEDURE — C1894: CPT

## 2019-02-01 PROCEDURE — 82272 OCCULT BLD FECES 1-3 TESTS: CPT

## 2019-02-01 PROCEDURE — 96376 TX/PRO/DX INJ SAME DRUG ADON: CPT

## 2019-02-01 PROCEDURE — 86850 RBC ANTIBODY SCREEN: CPT

## 2019-02-01 PROCEDURE — 74019 RADEX ABDOMEN 2 VIEWS: CPT

## 2019-02-01 PROCEDURE — 86147 CARDIOLIPIN ANTIBODY EA IG: CPT

## 2019-02-01 PROCEDURE — 76000 FLUOROSCOPY <1 HR PHYS/QHP: CPT

## 2019-02-01 PROCEDURE — 36415 COLL VENOUS BLD VENIPUNCTURE: CPT

## 2019-02-01 PROCEDURE — C1887: CPT

## 2019-02-01 PROCEDURE — 83605 ASSAY OF LACTIC ACID: CPT

## 2019-02-01 PROCEDURE — 74018 RADEX ABDOMEN 1 VIEW: CPT

## 2019-02-01 PROCEDURE — 86146 BETA-2 GLYCOPROTEIN ANTIBODY: CPT

## 2019-02-01 PROCEDURE — 83735 ASSAY OF MAGNESIUM: CPT

## 2019-02-01 PROCEDURE — 93970 EXTREMITY STUDY: CPT

## 2019-02-01 PROCEDURE — 93005 ELECTROCARDIOGRAM TRACING: CPT

## 2019-02-01 PROCEDURE — 85027 COMPLETE CBC AUTOMATED: CPT

## 2019-02-01 PROCEDURE — 96375 TX/PRO/DX INJ NEW DRUG ADDON: CPT

## 2019-02-01 PROCEDURE — 99232 SBSQ HOSP IP/OBS MODERATE 35: CPT

## 2019-02-01 PROCEDURE — 74177 CT ABD & PELVIS W/CONTRAST: CPT

## 2019-02-01 PROCEDURE — 84484 ASSAY OF TROPONIN QUANT: CPT

## 2019-02-01 PROCEDURE — 71275 CT ANGIOGRAPHY CHEST: CPT

## 2019-02-01 PROCEDURE — 80053 COMPREHEN METABOLIC PANEL: CPT

## 2019-02-01 PROCEDURE — C1769: CPT

## 2019-02-01 PROCEDURE — 96374 THER/PROPH/DIAG INJ IV PUSH: CPT

## 2019-02-01 PROCEDURE — 86900 BLOOD TYPING SEROLOGIC ABO: CPT

## 2019-02-01 RX ORDER — FONDAPARINUX SODIUM 2.5 MG/.5ML
1 INJECTION, SOLUTION SUBCUTANEOUS
Qty: 60 | Refills: 0
Start: 2019-02-01 | End: 2019-03-02

## 2019-02-01 RX ADMIN — PANTOPRAZOLE SODIUM 40 MILLIGRAM(S): 20 TABLET, DELAYED RELEASE ORAL at 05:19

## 2019-02-01 RX ADMIN — RIVAROXABAN 15 MILLIGRAM(S): KIT at 05:19

## 2019-02-01 NOTE — PROGRESS NOTE ADULT - PROBLEM SELECTOR PLAN 2
clinically no sbo  c ray may reflect partial SBO  ?remove NGT
she did not have clinical sign of SBO although CT showed distended loops.  she had multiple abd surgery and adhesion  ?surgery will be high risk specially in her who does not have clinical sign of SBO
x ray and CT shows sbo but clinically not  still on NGT suctioning  she had multiple surgery for SBO already  it will be a high risk surgery
1) cont hep drip  2) with hx of dvt will consult heme onc  3) f/u duplex of christel LE and christel UE  4) medicine follow-up
resolved  begin diet
con't Xarelto

## 2019-02-01 NOTE — PROGRESS NOTE ADULT - REASON FOR ADMISSION
Small bowel obstruction, PE

## 2019-02-01 NOTE — PROGRESS NOTE ADULT - PROVIDER SPECIALTY LIST ADULT
Heme/Onc
Internal Medicine
Surgery
Vascular Surgery
Heme/Onc
Internal Medicine

## 2019-02-01 NOTE — PROGRESS NOTE ADULT - PROBLEM SELECTOR PROBLEM 1
DVT (deep venous thrombosis)
Pulmonary thromboembolism
SBO (small bowel obstruction)
SBO (small bowel obstruction)

## 2019-02-01 NOTE — PROGRESS NOTE ADULT - SUBJECTIVE AND OBJECTIVE BOX
63y Female with no overnight complaints  SBS yesterday still shows SBO    Vital Signs:  T(C): 36.3 (01-29-19 @ 06:00), Max: 36.3 (01-28-19 @ 13:10)  HR: 75 (01-29-19 @ 06:00) (75 - 109)  BP: 113/79 (01-29-19 @ 06:00) (107/56 - 119/82)  RR: 16 (01-29-19 @ 06:00) (16 - 18)  SpO2: 98% (01-29-19 @ 06:00) (98% - 100%)  Wt(kg): --    Physical Exam:  General: NAD, comfortable  Abdomen: softly distended. non tender    Ins/Outs:    01-28 @ 07:01  -  01-29 @ 07:00  --------------------------------------------------------  IN:    dextrose 5% + sodium chloride 0.45% with potassium chloride 10 mEq/L: 1100 mL  Total IN: 1100 mL    OUT:    Indwelling Catheter - Urethral: 800 mL    Nasoenteral Tube: 300 mL  Total OUT: 1100 mL    Total NET: 0 mL
63y Female with no overnight complaints tolerating diet, + flatus and BM. Denies n/v.         Physical Exam:  General: NAD, comfortable  Abdomen: ABD more distended today, non tender, tympanic                           8.9    7.8   )-----------( 282      ( 31 Jan 2019 08:33 )             30.8   01-30    143  |  111<H>  |  4<L>  ----------------------------<  94  4.2   |  26  |  0.49<L>    Ca    7.1<L>      30 Jan 2019 07:25
Afebrile  comfortable feels better    Abd  markedly less distended, no tenderness, + flatus, no BM  NGT minimal             Imp  SBO clinically improving   Plan  Heparin drip lowered to 700ml/hr,  repeat obstructive series
Afebrile, feels very good    Abd  soft, non tender, + BM, tolerated clear liquids     Labs Ok  Imp  No evidence of obstruction, tolerating Lovenox, therepeutic dose  Plan  Advance diet, d/c planning next 24-48 hrs
INTERVAL HPI/OVERNIGHT EVENTS:  Pt resting comfortably. No acute complaints.   Tolerating reg diet.   +flatus/BM.   Denies N/V.    MEDICATIONS  (STANDING):  pantoprazole  Injectable 40 milliGRAM(s) IV Push two times a day  rivaroxaban 15 milliGRAM(s) Oral two times a day    MEDICATIONS  (PRN):    Vital Signs Last 24 Hrs  T(C): 36.5 (01 Feb 2019 05:41), Max: 36.8 (31 Jan 2019 14:10)  T(F): 97.7 (01 Feb 2019 05:41), Max: 98.2 (31 Jan 2019 14:10)  HR: 68 (01 Feb 2019 05:41) (68 - 90)  BP: 108/66 (01 Feb 2019 05:41) (108/66 - 117/69)  BP(mean): --  RR: 16 (01 Feb 2019 05:41) (16 - 17)  SpO2: 98% (01 Feb 2019 05:41) (98% - 100%)    Physical:  General: A&Ox3. NAD.  Abdomen: Soft distended, tympanic, nontender. Dressing C/D/I    LABS:                        8.9    7.8   )-----------( 282      ( 31 Jan 2019 08:33 )             30.8
Patient examined at bedside, denies abdominal pain  NGT in place with min output on lws  denies flatus or BM    T(C): 36.7 (01-25-19 @ 08:02), Max: 36.7 (01-24-19 @ 17:10)  HR: 85 (01-25-19 @ 08:02) (85 - 112)  BP: 97/58 (01-25-19 @ 08:02) (97/58 - 119/82)  RR: 18 (01-25-19 @ 08:02) (17 - 18)  SpO2: 95% (01-25-19 @ 08:02) (95% - 98%)  Wt(kg): --    Physical Exam  General: AAOx3, No acute distress  Skin: No jaundice, no icterus  Abdomen: distended but soft, nontender to deep palpation  : Normal external genitalia  Extremities: non edematous, no calf pain bilaterally                          9.0    10.8  )-----------( 337      ( 25 Jan 2019 07:11 )             29.5   01-25    144  |  113<H>  |  16  ----------------------------<  78  3.7   |  25  |  0.52    Ca    7.0<L>      25 Jan 2019 07:11    TPro  5.0<L>  /  Alb  1.1<L>  /  TBili  0.2  /  DBili  x   /  AST  34  /  ALT  37  /  AlkPhos  161<H>  01-24    < from: CT Angio Chest w/ IV Cont (01.24.19 @ 21:23) >  EXAM:  CT ANGIO CHEST (W)AW IC                            PROCEDURE DATE:  01/24/2019          INTERPRETATION:  Chest CTA, CT of the abdomen and pelvis with IV contrast     Technique: Axial multidetector CT images of the chest, abdomen, and   pelvis are acquired following intravenous administration 90 cc   Omnipaque-350 (10 cc discarded). Chest CT is performed according to our   pulmonary embolism protocol.    Comparison: Abdominal CT dated 2/8/2018    Findings:    Chest: Mild ectasia of the ascending aorta at 3.7 cm in diameter. No   evidence for aortic dissection. Aberrant right subclavian artery. Filling   defects are seen in the right upper lobe and right lower lobe segmental   pulmonary arteries, compatible with pulmonary embolism.    Mild bilateral pleural effusions. No pericardial effusion. Mild   cardiomegaly. Apparent distal esophageal mural thickening. No enlarged   mediastinal, hilar, or axillary lymph node.    The trachea and central bronchi appear grossly unremarkable.    Mild bilateral atelectasis. No evidence for pulmonary consolidation.    Abdomen/pelvis: Stable cholelithiasis. Mucosal hyperenhancement of the   gallbladder may be due to contraction or acute cholecystitis. Clinical   correlation is recommended.     Hepatic steatosis.    The common bile duct is not dilated.     Atrophic pancreas.    The spleen, and adrenals appear unremarkable. Tiny hypodense lesions in   the kidneys bilaterally, too small to characterize.    New large ascites in the abdomen and pelvis. Apparent small bowel   anastomosis in the right lower quadrant abdomen. There is small bowel   dilatation with apparent 2 converging transition points in the right   lower quadrant mesentery with associated twisting appearance. Findings   are suggestive of a closed loop small bowel obstruction. Given the large   ascites, associated bowel ischemia cannot be excluded. Segment mural   thickening of the ascending colon  may represent nonspecific colitis or   ischemic colitis. Apparent mural thickening of the stomach may be due to   underdistention; nonspecific gastritis or gastric cancer cannot be   excluded.     No evidence for free air or enlarged lymph node.    Apparent filling defect in the left common femoral and left superficial   femoral veins, compatible with deep vein thrombosis.    The urinary bladder and uterus appear unremarkable.    Age indeterminate compression fracture at T5 vertebra.    Impression: Positive pulmonary embolism. Apparent filling defect in the   left common femoral and left superficial femoral veins, compatible with   deep vein thrombosis.    Mild bilateral pleural effusions.    Apparent distal esophageal mural thickening. Apparent mural thickening of   the stomach may be due to underdistention; nonspecific gastritis or   gastric cancer cannot be excluded.  EGD may be pursued for further   evaluation.     Stable cholelithiasis. Mucosal hyperenhancement of the gallbladder may be   due to contraction or acute cholecystitis. Clinical correlation is   recommended.    Apparent small bowel anastomosis in the right lower quadrant abdomen.   There is small bowel dilatation with apparent 2 converging transition   points in the right lower quadrant mesentery with associated twisting   appearance. Findings are suggestive of a closed loop small bowel   obstruction. Given the large ascites, associated bowel ischemia cannot be   excluded. Segment mural thickening of the ascending colon  may represent   nonspecific colitis or ischemic colitis.     Age indeterminate compression fracture at T5 vertebra.    PA  is informed.    A preliminary report was provided by [employer].                    YING SORIA M.D., ATTENDING RADIOLOGIST  This document has been electronically signed. Jan 25 2019  9:27AM    < end of copied text >  < from: CT Angio Chest w/ IV Cont (01.24.19 @ 21:23) >  EXAM:  CT ANGIO CHEST (W)AW IC                            PROCEDURE DATE:  01/24/2019          INTERPRETATION:  Chest CTA, CT of the abdomen and pelvis with IV contrast     Technique: Axial multidetector CT images of the chest, abdomen, and   pelvis are acquired following intravenous administration 90 cc   Omnipaque-350 (10 cc discarded). Chest CT is performed according to our   pulmonary embolism protocol.    Comparison: Abdominal CT dated 2/8/2018    Findings:    Chest: Mild ectasia of the ascending aorta at 3.7 cm in diameter. No   evidence for aortic dissection. Aberrant right subclavian artery. Filling   defects are seen in the right upper lobe and right lower lobe segmental   pulmonary arteries, compatible with pulmonary embolism.    Mild bilateral pleural effusions. No pericardial effusion. Mild   cardiomegaly. Apparent distal esophageal mural thickening. No enlarged   mediastinal, hilar, or axillary lymph node.    The trachea and central bronchi appear grossly unremarkable.    Mild bilateral atelectasis. No evidence for pulmonary consolidation.    Abdomen/pelvis: Stable cholelithiasis. Mucosal hyperenhancement of the   gallbladder may be due to contraction or acute cholecystitis. Clinical   correlation is recommended.     Hepatic steatosis.    The common bile duct is not dilated.     Atrophic pancreas.    The spleen, and adrenals appear unremarkable. Tiny hypodense lesions in   the kidneys bilaterally, too small to characterize.    New large ascites in the abdomen and pelvis. Apparent small bowel   anastomosis in the right lower quadrant abdomen. There is small bowel   dilatation with apparent 2 converging transition points in the right   lower quadrant mesentery with associated twisting appearance. Findings   are suggestive of a closed loop small bowel obstruction. Given the large   ascites, associated bowel ischemia cannot be excluded. Segment mural   thickening of the ascending colon  may represent nonspecific colitis or   ischemic colitis. Apparent mural thickening of the stomach may be due to   underdistention; nonspecific gastritis or gastric cancer cannot be   excluded.     No evidence for free air or enlarged lymph node.    Apparent filling defect in the left common femoral and left superficial   femoral veins, compatible with deep vein thrombosis.    The urinary bladder and uterus appear unremarkable.    Age indeterminate compression fracture at T5 vertebra.    Impression: Positive pulmonary embolism. Apparent filling defect in the   left common femoral and left superficial femoral veins, compatible with   deep vein thrombosis.    Mild bilateral pleural effusions.    Apparent distal esophageal mural thickening. Apparent mural thickening of   the stomach may be due to underdistention; nonspecific gastritis or   gastric cancer cannot be excluded.  EGD may be pursued for further   evaluation.     Stable cholelithiasis. Mucosal hyperenhancement of the gallbladder may be   due to contraction or acute cholecystitis. Clinical correlation is   recommended.    Apparent small bowel anastomosis in the right lower quadrant abdomen.   There is small bowel dilatation with apparent 2 converging transition   points in the right lower quadrant mesentery with associated twisting   appearance. Findings are suggestive of a closed loop small bowel   obstruction. Given the large ascites, associated bowel ischemia cannot be   excluded. Segment mural thickening of the ascending colon  may represent   nonspecific colitis or ischemic colitis.     Age indeterminate compression fracture at T5 vertebra.    PA  is informed.    A preliminary report was provided by [facility] [facility].                    YING SORIA M.D., ATTENDING RADIOLOGIST  This document has been electronically signed. Jan 25 2019  9:27AM    < end of copied text >
Patient examined at bedside, denies abdominal pain  Ngt in place to lws  reports + flatus, no BM  Denies LE pain    T(C): 36.3 (01-26-19 @ 05:10), Max: 37.1 (01-25-19 @ 22:54)  HR: 63 (01-26-19 @ 05:10) (61 - 79)  BP: 108/74 (01-26-19 @ 05:10) (104/71 - 118/84)  RR: 18 (01-26-19 @ 05:10) (13 - 20)  SpO2: 100% (01-26-19 @ 05:10) (97% - 100%)  Wt(kg): --      01-25 @ 07:01  -  01-26 @ 07:00  --------------------------------------------------------  IN: 1657 mL / OUT: 400 mL / NET: 1257 mL      Physical Exam  General: AAOx3, No acute distress  Skin: No jaundice, no icterus  Abdomen: soft, mildly distended, nontender to deep palpation, reducible ventral hernia, well healed midline scar  Extremities: non edematous, no calf pain bilaterally                          9.7    10.4  )-----------( 343      ( 26 Jan 2019 09:08 )             33.3   01-26    145  |  112<H>  |  14  ----------------------------<  105<H>  3.4<L>   |  24  |  0.56    Ca    6.5<LL>      26 Jan 2019 07:10    TPro  4.5<L>  /  Alb  1.1<L>  /  TBili  0.3  /  DBili  x   /  AST  22  /  ALT  30  /  AlkPhos  151<H>  01-26
Patient examined at bedside, denies abdominal pain  No nausea, no vomiting  pt is npo with ngt in place  reports flatus, no BM    T(C): 36.4 (01-28-19 @ 05:26), Max: 36.4 (01-27-19 @ 21:58)  HR: 65 (01-28-19 @ 05:26) (61 - 67)  BP: 114/74 (01-28-19 @ 05:26) (98/69 - 114/74)  RR: 16 (01-28-19 @ 05:26) (16 - 17)  SpO2: 99% (01-28-19 @ 05:26) (98% - 100%)  Wt(kg): --      01-27 @ 07:01  -  01-28 @ 07:00  --------------------------------------------------------  IN: 0 mL / OUT: 1575 mL / NET: -1575 mL      Physical Exam  General: AAOx3, No acute distress  Skin: No jaundice, no icterus  Abdomen: soft, nontender, nondistended, no masses  Extremities: non edematous, no calf pain bilaterally    01-28    142  |  109<H>  |  7   ----------------------------<  97  3.0<L>   |  24  |  0.53    Ca    6.6<L>      28 Jan 2019 08:46
Patient was seen and examined  Patient is a 63y old  Female who presents with a chief complaint of Small bowel obstruction, PE (27 Jan 2019 09:40)  clinically better       INTERVAL HPI/OVERNIGHT EVENTS:  T(C): 36.2 (01-27-19 @ 14:28), Max: 36.4 (01-27-19 @ 05:41)  HR: 67 (01-27-19 @ 14:28) (65 - 78)  BP: 98/69 (01-27-19 @ 14:28) (98/69 - 109/73)  RR: 16 (01-27-19 @ 14:28) (16 - 17)  SpO2: 98% (01-27-19 @ 14:28) (98% - 100%)  Wt(kg): --  I&O's Summary    26 Jan 2019 07:01  -  27 Jan 2019 07:00  --------------------------------------------------------  IN: 1496 mL / OUT: 2700 mL / NET: -1204 mL    27 Jan 2019 07:01  -  27 Jan 2019 16:53  --------------------------------------------------------  IN: 0 mL / OUT: 275 mL / NET: -275 mL        LABS:                        8.6    7.4   )-----------( 342      ( 27 Jan 2019 07:52 )             29.5     01-27    143  |  110<H>  |  10  ----------------------------<  97  3.2<L>   |  25  |  0.60    Ca    6.2<LL>      27 Jan 2019 07:52    TPro  4.5<L>  /  Alb  1.1<L>  /  TBili  0.3  /  DBili  x   /  AST  22  /  ALT  30  /  AlkPhos  151<H>  01-26    PTT - ( 27 Jan 2019 13:07 )  PTT:134.5 sec    CAPILLARY BLOOD GLUCOSE                  MEDICATIONS  (STANDING):  ciprofloxacin   IVPB 400 milliGRAM(s) IV Intermittent every 12 hours  dextrose 5% + sodium chloride 0.9%. 1000 milliLiter(s) (100 mL/Hr) IV Continuous <Continuous>  heparin  Infusion. 800 Unit(s)/Hr (8 mL/Hr) IV Continuous <Continuous>  pantoprazole  Injectable 40 milliGRAM(s) IV Push two times a day    MEDICATIONS  (PRN):      RADIOLOGY & ADDITIONAL TESTS:    Imaging Personally Reviewed:  [ ] YES  [ ] NO    REVIEW OF SYSTEMS:  CONSTITUTIONAL: No fever, weight loss, or fatigue  EYES: No eye pain, visual disturbances, or discharge  ENMT:  No difficulty hearing, tinnitus, vertigo; No sinus or throat pain  NECK: No pain or stiffness  BREASTS: No pain, masses, or nipple discharge  RESPIRATORY: No cough, wheezing, chills or hemoptysis; No shortness of breath  CARDIOVASCULAR: No chest pain, palpitations, dizziness, or leg swelling  GASTROINTESTINAL: No abdominal or epigastric pain. No nausea, vomiting, or hematemesis; No diarrhea or constipation. No melena or hematochezia.  GENITOURINARY: No dysuria, frequency, hematuria, or incontinence  NEUROLOGICAL: No headaches, memory loss, loss of strength, numbness, or tremors  SKIN: No itching, burning, rashes, or lesions   LYMPH NODES: No enlarged glands  ENDOCRINE: No heat or cold intolerance; No hair loss  MUSCULOSKELETAL: No joint pain or swelling; No muscle, back, or extremity pain  PSYCHIATRIC: No depression, anxiety, mood swings, or difficulty sleeping  HEME/LYMPH: No easy bruising, or bleeding gums  ALLERY AND IMMUNOLOGIC: No hives or eczema      Consultant(s) Notes Reviewed:  [ ] YES  [ ] NO    PHYSICAL EXAM:  GENERAL: NAD, well-groomed, well-developed  HEAD:  Atraumatic, Normocephalic  EYES: EOMI, PERRLA, conjunctiva and sclera clear  ENMT: No tonsillar erythema, exudates, or enlargement; Moist mucous membranes, Good dentition, No lesions  NECK: Supple, No JVD, Normal thyroid  NERVOUS SYSTEM:  Alert & Oriented X3, Good concentration; Motor Strength 5/5 B/L upper and lower extremities; DTRs 2+ intact and symmetric  CHEST/LUNG: Clear to percussion bilaterally; No rales, rhonchi, wheezing, or rubs  HEART: Regular rate and rhythm; No murmurs, rubs, or gallops  ABDOMEN: Soft, Nontender, Nondistended; Bowel sounds present  EXTREMITIES:  2+ Peripheral Pulses, No clubbing, cyanosis, or edema  LYMPH: No lymphadenopathy noted  SKIN: No rashes or lesions    Care Discussed with Consultants/Other Providers [ x] YES  [ ] NO
Patient was seen and examined  Patient is a 63y old  Female who presents with a chief complaint of Small bowel obstruction, PE (28 Jan 2019 18:42)  clinically better       INTERVAL HPI/OVERNIGHT EVENTS:  T(C): 36.3 (01-28-19 @ 22:16), Max: 36.4 (01-28-19 @ 05:26)  HR: 81 (01-28-19 @ 22:16) (65 - 109)  BP: 114/84 (01-28-19 @ 22:16) (107/56 - 119/82)  RR: 18 (01-28-19 @ 22:16) (16 - 18)  SpO2: 100% (01-28-19 @ 22:16) (99% - 100%)  Wt(kg): --  I&O's Summary    27 Jan 2019 07:01  -  28 Jan 2019 07:00  --------------------------------------------------------  IN: 0 mL / OUT: 1575 mL / NET: -1575 mL    28 Jan 2019 07:01  -  29 Jan 2019 01:14  --------------------------------------------------------  IN: 1100 mL / OUT: 900 mL / NET: 200 mL        LABS:                        9.2    6.2   )-----------( 353      ( 28 Jan 2019 08:46 )             31.5     01-28    142  |  109<H>  |  7   ----------------------------<  97  3.0<L>   |  24  |  0.53    Ca    6.6<L>      28 Jan 2019 08:46      PT/INR - ( 28 Jan 2019 08:46 )   PT: 19.4 sec;   INR: 1.72 ratio         PTT - ( 28 Jan 2019 08:46 )  PTT:>200.0 sec    CAPILLARY BLOOD GLUCOSE                  MEDICATIONS  (STANDING):  ciprofloxacin   IVPB 400 milliGRAM(s) IV Intermittent every 12 hours  dextrose 5% + sodium chloride 0.45% with potassium chloride 10 mEq/L 100 milliLiter(s) (100 mL/Hr) IV Continuous <Continuous>  dextrose 5% + sodium chloride 0.9%. 1000 milliLiter(s) (100 mL/Hr) IV Continuous <Continuous>  enoxaparin Injectable 60 milliGRAM(s) SubCutaneous every 12 hours  magnesium sulfate  IVPB 1 Gram(s) IV Intermittent once  pantoprazole  Injectable 40 milliGRAM(s) IV Push two times a day    MEDICATIONS  (PRN):      RADIOLOGY & ADDITIONAL TESTS:    Imaging Personally Reviewed:  [ ] YES  [ ] NO    REVIEW OF SYSTEMS:  CONSTITUTIONAL: No fever, weight loss, or fatigue  EYES: No eye pain, visual disturbances, or discharge  ENMT:  No difficulty hearing, tinnitus, vertigo; No sinus or throat pain  NECK: No pain or stiffness  BREASTS: No pain, masses, or nipple discharge  RESPIRATORY: No cough, wheezing, chills or hemoptysis; No shortness of breath  CARDIOVASCULAR: No chest pain, palpitations, dizziness, or leg swelling  GASTROINTESTINAL: No abdominal or epigastric pain. No nausea, vomiting, or hematemesis; No diarrhea or constipation. No melena or hematochezia.  GENITOURINARY: No dysuria, frequency, hematuria, or incontinence  NEUROLOGICAL: No headaches, memory loss, loss of strength, numbness, or tremors  SKIN: No itching, burning, rashes, or lesions   LYMPH NODES: No enlarged glands  ENDOCRINE: No heat or cold intolerance; No hair loss  MUSCULOSKELETAL: No joint pain or swelling; No muscle, back, or extremity pain  PSYCHIATRIC: No depression, anxiety, mood swings, or difficulty sleeping  HEME/LYMPH: No easy bruising, or bleeding gums  ALLERY AND IMMUNOLOGIC: No hives or eczema      Consultant(s) Notes Reviewed:  [ ] YES  [ ] NO    PHYSICAL EXAM:  GENERAL: NAD, well-groomed, well-developed  HEAD:  Atraumatic, Normocephalic  EYES: EOMI, PERRLA, conjunctiva and sclera clear  ENMT: No tonsillar erythema, exudates, or enlargement; Moist mucous membranes, Good dentition, No lesions  NECK: Supple, No JVD, Normal thyroid  NERVOUS SYSTEM:  Alert & Oriented X3, Good concentration; Motor Strength 5/5 B/L upper and lower extremities; DTRs 2+ intact and symmetric  CHEST/LUNG: Clear to percussion bilaterally; No rales, rhonchi, wheezing, or rubs  HEART: Regular rate and rhythm; No murmurs, rubs, or gallops  ABDOMEN: Soft, Nontender, Nondistended; Bowel sounds present  EXTREMITIES:  2+ Peripheral Pulses, No clubbing, cyanosis, or edema  LYMPH: No lymphadenopathy noted  SKIN: No rashes or lesions    Care Discussed with Consultants/Other Providers [ x] YES  [ ] NO
Patient was seen and examined  Patient is a 63y old  Female who presents with a chief complaint of Small bowel obstruction, PE (29 Jan 2019 09:56)      INTERVAL HPI/OVERNIGHT EVENTS:  T(C): 36.3 (01-29-19 @ 06:00), Max: 36.3 (01-28-19 @ 13:10)  HR: 75 (01-29-19 @ 06:00) (75 - 109)  BP: 113/79 (01-29-19 @ 06:00) (107/56 - 119/82)  RR: 16 (01-29-19 @ 06:00) (16 - 18)  SpO2: 98% (01-29-19 @ 06:00) (98% - 100%)  Wt(kg): --  I&O's Summary    28 Jan 2019 07:01  -  29 Jan 2019 07:00  --------------------------------------------------------  IN: 1100 mL / OUT: 1100 mL / NET: 0 mL        LABS:                        8.7    5.4   )-----------( 343      ( 29 Jan 2019 07:47 )             29.9     01-29    142  |  111<H>  |  5<L>  ----------------------------<  107<H>  3.2<L>   |  25  |  0.50    Ca    6.5<LL>      29 Jan 2019 07:47  Phos  2.2     01-29  Mg     1.4     01-29      PT/INR - ( 28 Jan 2019 08:46 )   PT: 19.4 sec;   INR: 1.72 ratio         PTT - ( 28 Jan 2019 08:46 )  PTT:>200.0 sec    CAPILLARY BLOOD GLUCOSE                  MEDICATIONS  (STANDING):  ciprofloxacin   IVPB 400 milliGRAM(s) IV Intermittent every 12 hours  enoxaparin Injectable 60 milliGRAM(s) SubCutaneous every 12 hours  pantoprazole  Injectable 40 milliGRAM(s) IV Push two times a day  potassium chloride    Tablet ER 40 milliEquivalent(s) Oral every 4 hours    MEDICATIONS  (PRN):      RADIOLOGY & ADDITIONAL TESTS:    Imaging Personally Reviewed:  [ ] YES  [ ] NO    REVIEW OF SYSTEMS:  CONSTITUTIONAL: No fever, weight loss, or fatigue  EYES: No eye pain, visual disturbances, or discharge  ENMT:  No difficulty hearing, tinnitus, vertigo; No sinus or throat pain  NECK: No pain or stiffness  BREASTS: No pain, masses, or nipple discharge  RESPIRATORY: No cough, wheezing, chills or hemoptysis; No shortness of breath  CARDIOVASCULAR: No chest pain, palpitations, dizziness, or leg swelling  GASTROINTESTINAL: No abdominal or epigastric pain. No nausea, vomiting, or hematemesis; No diarrhea or constipation. No melena or hematochezia.  GENITOURINARY: No dysuria, frequency, hematuria, or incontinence  NEUROLOGICAL: No headaches, memory loss, loss of strength, numbness, or tremors  SKIN: No itching, burning, rashes, or lesions   LYMPH NODES: No enlarged glands  ENDOCRINE: No heat or cold intolerance; No hair loss  MUSCULOSKELETAL: No joint pain or swelling; No muscle, back, or extremity pain  PSYCHIATRIC: No depression, anxiety, mood swings, or difficulty sleeping  HEME/LYMPH: No easy bruising, or bleeding gums  ALLERY AND IMMUNOLOGIC: No hives or eczema      Consultant(s) Notes Reviewed:  [ ] YES  [ ] NO    PHYSICAL EXAM:  GENERAL: NAD, well-groomed, well-developed  HEAD:  Atraumatic, Normocephalic  EYES: EOMI, PERRLA, conjunctiva and sclera clear  ENMT: No tonsillar erythema, exudates, or enlargement; Moist mucous membranes, Good dentition, No lesions  NECK: Supple, No JVD, Normal thyroid  NERVOUS SYSTEM:  Alert & Oriented X3, Good concentration; Motor Strength 5/5 B/L upper and lower extremities; DTRs 2+ intact and symmetric  CHEST/LUNG: Clear to percussion bilaterally; No rales, rhonchi, wheezing, or rubs  HEART: Regular rate and rhythm; No murmurs, rubs, or gallops  ABDOMEN: Soft, Nontender, Nondistended; Bowel sounds present  EXTREMITIES:  2+ Peripheral Pulses, No clubbing, cyanosis, or edema  LYMPH: No lymphadenopathy noted  SKIN: No rashes or lesions    Care Discussed with Consultants/Other Providers [ x] YES  [ ] NO
Patient was seen and examined  Patient is a 63y old  Female who presents with a chief complaint of Small bowel obstruction, PE (30 Jan 2019 11:11)      INTERVAL HPI/OVERNIGHT EVENTS:  T(C): 36.5 (01-30-19 @ 14:19), Max: 36.7 (01-29-19 @ 21:21)  HR: 89 (01-30-19 @ 14:19) (72 - 89)  BP: 117/67 (01-30-19 @ 14:19) (111/68 - 117/67)  RR: 17 (01-30-19 @ 14:19) (16 - 17)  SpO2: 98% (01-30-19 @ 14:19) (98% - 100%)  Wt(kg): --  I&O's Summary    29 Jan 2019 07:01  -  30 Jan 2019 07:00  --------------------------------------------------------  IN: 0 mL / OUT: 1825 mL / NET: -1825 mL    30 Jan 2019 07:01  -  30 Jan 2019 19:02  --------------------------------------------------------  IN: 0 mL / OUT: 1100 mL / NET: -1100 mL        LABS:                        8.7    6.7   )-----------( 381      ( 30 Jan 2019 07:25 )             29.8     01-30    143  |  111<H>  |  4<L>  ----------------------------<  94  4.2   |  26  |  0.49<L>    Ca    7.1<L>      30 Jan 2019 07:25  Phos  2.2     01-29  Mg     1.4     01-29          CAPILLARY BLOOD GLUCOSE                  MEDICATIONS  (STANDING):  ciprofloxacin   IVPB 400 milliGRAM(s) IV Intermittent every 12 hours  dextrose 5% + sodium chloride 0.45% with potassium chloride 10 mEq/L 100 milliLiter(s) (100 mL/Hr) IV Continuous <Continuous>  enoxaparin Injectable 60 milliGRAM(s) SubCutaneous every 12 hours  pantoprazole  Injectable 40 milliGRAM(s) IV Push two times a day    MEDICATIONS  (PRN):      RADIOLOGY & ADDITIONAL TESTS:    Imaging Personally Reviewed:  [ ] YES  [ ] NO    REVIEW OF SYSTEMS:  CONSTITUTIONAL: No fever, weight loss, or fatigue  EYES: No eye pain, visual disturbances, or discharge  ENMT:  No difficulty hearing, tinnitus, vertigo; No sinus or throat pain  NECK: No pain or stiffness  BREASTS: No pain, masses, or nipple discharge  RESPIRATORY: No cough, wheezing, chills or hemoptysis; No shortness of breath  CARDIOVASCULAR: No chest pain, palpitations, dizziness, or leg swelling  GASTROINTESTINAL: No abdominal or epigastric pain. No nausea, vomiting, or hematemesis; No diarrhea or constipation. No melena or hematochezia.  GENITOURINARY: No dysuria, frequency, hematuria, or incontinence  NEUROLOGICAL: No headaches, memory loss, loss of strength, numbness, or tremors  SKIN: No itching, burning, rashes, or lesions   LYMPH NODES: No enlarged glands  ENDOCRINE: No heat or cold intolerance; No hair loss  MUSCULOSKELETAL: No joint pain or swelling; No muscle, back, or extremity pain  PSYCHIATRIC: No depression, anxiety, mood swings, or difficulty sleeping  HEME/LYMPH: No easy bruising, or bleeding gums  ALLERY AND IMMUNOLOGIC: No hives or eczema      Consultant(s) Notes Reviewed:  [ ] YES  [ ] NO    PHYSICAL EXAM:  GENERAL: NAD, well-groomed, well-developed  HEAD:  Atraumatic, Normocephalic  EYES: EOMI, PERRLA, conjunctiva and sclera clear  ENMT: No tonsillar erythema, exudates, or enlargement; Moist mucous membranes, Good dentition, No lesions  NECK: Supple, No JVD, Normal thyroid  NERVOUS SYSTEM:  Alert & Oriented X3, Good concentration; Motor Strength 5/5 B/L upper and lower extremities; DTRs 2+ intact and symmetric  CHEST/LUNG: Clear to percussion bilaterally; No rales, rhonchi, wheezing, or rubs  HEART: Regular rate and rhythm; No murmurs, rubs, or gallops  ABDOMEN: Soft, Nontender, Nondistended; Bowel sounds present  EXTREMITIES:  2+ Peripheral Pulses, No clubbing, cyanosis, or edema  LYMPH: No lymphadenopathy noted  SKIN: No rashes or lesions    Care Discussed with Consultants/Other Providers [ x] YES  [ ] NO
Stable p IVC filter placement  Groin ok    Please note: pt has a retrievable filter- please refer back as soon as possible for removal of filter when no longer needed.  (pt aware)
feel ok  no pain  she had BM and flatus at home  she never had N/V at home either  now has NG tube for suctioning  no fever, chills, sob, cp    ROS:  Negative except for:    MEDICATIONS  (STANDING):  ciprofloxacin   IVPB 400 milliGRAM(s) IV Intermittent every 12 hours  dextrose 5% + sodium chloride 0.9%. 1000 milliLiter(s) (100 mL/Hr) IV Continuous <Continuous>  heparin  Infusion. 800 Unit(s)/Hr (8 mL/Hr) IV Continuous <Continuous>  pantoprazole  Injectable 40 milliGRAM(s) IV Push two times a day    MEDICATIONS  (PRN):      Allergies    No Known Allergies    Intolerances        Vital Signs Last 24 Hrs  T(C): 34.7 (26 Jan 2019 13:00), Max: 37.1 (25 Jan 2019 22:54)  T(F): 94.5 (26 Jan 2019 13:00), Max: 98.8 (25 Jan 2019 22:54)  HR: 61 (26 Jan 2019 13:00) (61 - 78)  BP: 99/67 (26 Jan 2019 13:00) (99/67 - 118/84)  BP(mean): --  RR: 16 (26 Jan 2019 13:00) (13 - 20)  SpO2: 100% (26 Jan 2019 13:00) (99% - 100%)    PHYSICAL EXAM  General: adult in NAD  HEENT: clear oropharynx, anicteric sclera, pink conjunctiva  Neck: supple  CV: normal S1/S2 with no murmur rubs or gallops  Lungs: positive air movement b/l ant lungs,clear to auscultation, no wheezes, no rales  Abdomen: soft non-tender non-distended, no hepatosplenomegaly  Ext: no clubbing cyanosis or edema  Skin: no rashes and no petechiae  Neuro: alert and oriented X 4, no focal deficits  LABS:                          9.7    10.4  )-----------( 343      ( 26 Jan 2019 09:08 )             33.3         Mean Cell Volume : 96.0 fl  Mean Cell Hemoglobin : 28.0 pg  Mean Cell Hemoglobin Concentration : 29.2 gm/dL  Auto Neutrophil # : x  Auto Lymphocyte # : x  Auto Monocyte # : x  Auto Eosinophil # : x  Auto Basophil # : x  Auto Neutrophil % : x  Auto Lymphocyte % : x  Auto Monocyte % : x  Auto Eosinophil % : x  Auto Basophil % : x    Serial CBC  Hematocrit 33.3  Hemoglobin 9.7  Plat 343  RBC 3.47  WBC 10.4  Serial CBC  Hematocrit 33.0  Hemoglobin 9.6  Plat 378  RBC 3.34  WBC 10.2  Serial CBC  Hematocrit 32.8  Hemoglobin 9.4  Plat 363  RBC 3.36  WBC 9.5  Serial CBC  Hematocrit 29.5  Hemoglobin 9.0  Plat 337  RBC 3.13  WBC 10.8  Serial CBC  Hematocrit 30.7  Hemoglobin 9.0  Plat 368  RBC 3.16  WBC 11.0    01-26    145  |  112<H>  |  14  ----------------------------<  105<H>  3.4<L>   |  24  |  0.56    Ca    6.5<LL>      26 Jan 2019 07:10    TPro  4.5<L>  /  Alb  1.1<L>  /  TBili  0.3  /  DBili  x   /  AST  22  /  ALT  30  /  AlkPhos  151<H>  01-26      PTT - ( 26 Jan 2019 10:16 )  PTT:92.0 sec              BLOOD SMEAR INTERPRETATION:       RADIOLOGY & ADDITIONAL STUDIES:
no N/V still on NG suctioning  has flatus everyday  no BM for 2 days  no abd pain or bleeding  on heparin IV  ROS:  Negative except for:    MEDICATIONS  (STANDING):  ciprofloxacin   IVPB 400 milliGRAM(s) IV Intermittent every 12 hours  dextrose 5% + sodium chloride 0.45% with potassium chloride 10 mEq/L 100 milliLiter(s) (100 mL/Hr) IV Continuous <Continuous>  dextrose 5% + sodium chloride 0.9%. 1000 milliLiter(s) (100 mL/Hr) IV Continuous <Continuous>  heparin  Infusion. 800 Unit(s)/Hr (8 mL/Hr) IV Continuous <Continuous>  pantoprazole  Injectable 40 milliGRAM(s) IV Push two times a day  potassium chloride  10 mEq/100 mL IVPB 10 milliEquivalent(s) IV Intermittent every 1 hour    MEDICATIONS  (PRN):      Allergies    No Known Allergies    Intolerances        Vital Signs Last 24 Hrs  T(C): 36.4 (28 Jan 2019 05:26), Max: 36.4 (27 Jan 2019 21:58)  T(F): 97.6 (28 Jan 2019 05:26), Max: 97.6 (28 Jan 2019 05:26)  HR: 65 (28 Jan 2019 05:26) (61 - 67)  BP: 114/74 (28 Jan 2019 05:26) (98/69 - 114/74)  BP(mean): --  RR: 16 (28 Jan 2019 05:26) (16 - 17)  SpO2: 99% (28 Jan 2019 05:26) (98% - 100%)    PHYSICAL EXAM  General: adult in NAD  HEENT: clear oropharynx, anicteric sclera, pink conjunctiva  Neck: supple  CV: normal S1/S2 with no murmur rubs or gallops  Lungs: positive air movement b/l ant lungs,clear to auscultation, no wheezes, no rales  Abdomen: soft non-tender non-distended, no hepatosplenomegaly  Ext: no clubbing cyanosis or edema  Skin: no rashes and no petechiae  Neuro: alert and oriented X 4, no focal deficits  LABS:                          8.6    7.4   )-----------( 342      ( 27 Jan 2019 07:52 )             29.5         Mean Cell Volume : 96.6 fl  Mean Cell Hemoglobin : 28.0 pg  Mean Cell Hemoglobin Concentration : 29.0 gm/dL  Auto Neutrophil # : x  Auto Lymphocyte # : x  Auto Monocyte # : x  Auto Eosinophil # : x  Auto Basophil # : x  Auto Neutrophil % : x  Auto Lymphocyte % : x  Auto Monocyte % : x  Auto Eosinophil % : x  Auto Basophil % : x    Serial CBC  Hematocrit 29.5  Hemoglobin 8.6  Plat 342  RBC 3.05  WBC 7.4  Serial CBC  Hematocrit 33.3  Hemoglobin 9.7  Plat 343  RBC 3.47  WBC 10.4  Serial CBC  Hematocrit 33.0  Hemoglobin 9.6  Plat 378  RBC 3.34  WBC 10.2  Serial CBC  Hematocrit 32.8  Hemoglobin 9.4  Plat 363  RBC 3.36  WBC 9.5  Serial CBC  Hematocrit 29.5  Hemoglobin 9.0  Plat 337  RBC 3.13  WBC 10.8  Serial CBC  Hematocrit 30.7  Hemoglobin 9.0  Plat 368  RBC 3.16  WBC 11.0    01-28    142  |  109<H>  |  7   ----------------------------<  97  3.0<L>   |  24  |  0.53    Ca    6.6<L>      28 Jan 2019 08:46        PT/INR - ( 28 Jan 2019 08:46 )   PT: 19.4 sec;   INR: 1.72 ratio         PTT - ( 28 Jan 2019 08:46 )  PTT:>200.0 sec              BLOOD SMEAR INTERPRETATION:       RADIOLOGY & ADDITIONAL STUDIES:
no abd pain  had BM yesterday and today  still on NGT suction  no bleeding on heparin  no cp or SOB  MEDICATIONS  (STANDING):  ciprofloxacin   IVPB 400 milliGRAM(s) IV Intermittent every 12 hours  enoxaparin Injectable 60 milliGRAM(s) SubCutaneous every 12 hours  magnesium sulfate  IVPB 1 Gram(s) IV Intermittent once  pantoprazole  Injectable 40 milliGRAM(s) IV Push two times a day  potassium chloride    Tablet ER 40 milliEquivalent(s) Oral every 4 hours  potassium phosphate IVPB 15 milliMole(s) IV Intermittent once    MEDICATIONS  (PRN):      Allergies    No Known Allergies    Intolerances        Vital Signs Last 24 Hrs  T(C): 36.3 (29 Jan 2019 06:00), Max: 36.3 (28 Jan 2019 13:10)  T(F): 97.4 (29 Jan 2019 06:00), Max: 97.4 (28 Jan 2019 13:10)  HR: 75 (29 Jan 2019 06:00) (75 - 109)  BP: 113/79 (29 Jan 2019 06:00) (107/56 - 119/82)  BP(mean): --  RR: 16 (29 Jan 2019 06:00) (16 - 18)  SpO2: 98% (29 Jan 2019 06:00) (98% - 100%)    PHYSICAL EXAM  General: adult in NAD  HEENT: clear oropharynx, anicteric sclera, pink conjunctiva  Neck: supple  CV: normal S1/S2 with no murmur rubs or gallops  Lungs: positive air movement b/l ant lungs,clear to auscultation, no wheezes, no rales  Abdomen: soft non-tender non-distended, no hepatosplenomegaly  Ext: no clubbing cyanosis or edema  Skin: no rashes and no petechiae  Neuro: alert and oriented X 4, no focal deficits  LABS:                          8.7    5.4   )-----------( 343      ( 29 Jan 2019 07:47 )             29.9         Mean Cell Volume : 96.2 fl  Mean Cell Hemoglobin : 27.9 pg  Mean Cell Hemoglobin Concentration : 29.0 gm/dL  Auto Neutrophil # : x  Auto Lymphocyte # : x  Auto Monocyte # : x  Auto Eosinophil # : x  Auto Basophil # : x  Auto Neutrophil % : x  Auto Lymphocyte % : x  Auto Monocyte % : x  Auto Eosinophil % : x  Auto Basophil % : x    Serial CBC  Hematocrit 29.9  Hemoglobin 8.7  Plat 343  RBC 3.11  WBC 5.4  Serial CBC  Hematocrit 31.5  Hemoglobin 9.2  Plat 353  RBC 3.32  WBC 6.2  Serial CBC  Hematocrit 29.5  Hemoglobin 8.6  Plat 342  RBC 3.05  WBC 7.4  Serial CBC  Hematocrit 33.3  Hemoglobin 9.7  Plat 343  RBC 3.47  WBC 10.4  Serial CBC  Hematocrit 33.0  Hemoglobin 9.6  Plat 378  RBC 3.34  WBC 10.2  Serial CBC  Hematocrit 32.8  Hemoglobin 9.4  Plat 363  RBC 3.36  WBC 9.5    01-29    142  |  111<H>  |  5<L>  ----------------------------<  107<H>  3.2<L>   |  25  |  0.50    Ca    6.5<LL>      29 Jan 2019 07:47  Phos  2.2     01-29  Mg     1.4     01-29        PT/INR - ( 28 Jan 2019 08:46 )   PT: 19.4 sec;   INR: 1.72 ratio         PTT - ( 28 Jan 2019 08:46 )  PTT:>200.0 sec              BLOOD SMEAR INTERPRETATION:       RADIOLOGY & ADDITIONAL STUDIES:
small bowel series showed contrast goes down to rectum  no obstruction  NGT is out  she feels fine  no abd pain, N.V  arms and legs are swollen  no sob    MEDICATIONS  (STANDING):  ciprofloxacin   IVPB 400 milliGRAM(s) IV Intermittent every 12 hours  dextrose 5% + sodium chloride 0.45% with potassium chloride 10 mEq/L 100 milliLiter(s) (100 mL/Hr) IV Continuous <Continuous>  enoxaparin Injectable 60 milliGRAM(s) SubCutaneous every 12 hours  pantoprazole  Injectable 40 milliGRAM(s) IV Push two times a day    MEDICATIONS  (PRN):      Allergies    No Known Allergies    Intolerances        Vital Signs Last 24 Hrs  T(C): 36.6 (30 Jan 2019 06:11), Max: 36.7 (29 Jan 2019 21:21)  T(F): 97.9 (30 Jan 2019 06:11), Max: 98.1 (29 Jan 2019 21:21)  HR: 72 (30 Jan 2019 06:11) (72 - 80)  BP: 114/87 (30 Jan 2019 06:11) (111/68 - 122/90)  BP(mean): --  RR: 16 (30 Jan 2019 06:11) (16 - 17)  SpO2: 98% (30 Jan 2019 06:11) (98% - 100%)    PHYSICAL EXAM  General: adult in NAD  HEENT: clear oropharynx, anicteric sclera, pink conjunctiva  Neck: supple  CV: normal S1/S2 with no murmur rubs or gallops  Lungs: positive air movement b/l ant lungs,clear to auscultation, no wheezes, no rales  Abdomen: soft non-tender non-distended, no hepatosplenomegaly  Ext: no clubbing cyanosis or edema  Skin: no rashes and no petechiae  Neuro: alert and oriented X 4, no focal deficits  LABS:                          8.7    6.7   )-----------( 381      ( 30 Jan 2019 07:25 )             29.8         Mean Cell Volume : 96.0 fl  Mean Cell Hemoglobin : 27.9 pg  Mean Cell Hemoglobin Concentration : 29.1 gm/dL  Auto Neutrophil # : 4.2 K/uL  Auto Lymphocyte # : 1.8 K/uL  Auto Monocyte # : 0.5 K/uL  Auto Eosinophil # : 0.1 K/uL  Auto Basophil # : 0.1 K/uL  Auto Neutrophil % : 63.0 %  Auto Lymphocyte % : 26.3 %  Auto Monocyte % : 7.9 %  Auto Eosinophil % : 1.6 %  Auto Basophil % : 1.2 %    Serial CBC  Hematocrit 29.8  Hemoglobin 8.7  Plat 381  RBC 3.10  WBC 6.7  Serial CBC  Hematocrit 29.9  Hemoglobin 8.7  Plat 343  RBC 3.11  WBC 5.4  Serial CBC  Hematocrit 31.5  Hemoglobin 9.2  Plat 353  RBC 3.32  WBC 6.2  Serial CBC  Hematocrit 29.5  Hemoglobin 8.6  Plat 342  RBC 3.05  WBC 7.4    01-30    143  |  111<H>  |  4<L>  ----------------------------<  94  4.2   |  26  |  0.49<L>    Ca    7.1<L>      30 Jan 2019 07:25  Phos  2.2     01-29  Mg     1.4     01-29                      BLOOD SMEAR INTERPRETATION:       RADIOLOGY & ADDITIONAL STUDIES:
Patient was seen and examined  Patient is a 63y old  Female who presents with a chief complaint of Small bowel obstruction, PE (25 Jan 2019 21:18)      INTERVAL HPI/OVERNIGHT EVENTS:  T(C): 36.3 (01-26-19 @ 05:10), Max: 37.1 (01-25-19 @ 22:54)  HR: 63 (01-26-19 @ 05:10) (61 - 85)  BP: 108/74 (01-26-19 @ 05:10) (97/58 - 118/84)  RR: 18 (01-26-19 @ 05:10) (13 - 20)  SpO2: 100% (01-26-19 @ 05:10) (95% - 100%)  Wt(kg): --  I&O's Summary    25 Jan 2019 07:01  -  26 Jan 2019 07:00  --------------------------------------------------------  IN: 1657 mL / OUT: 400 mL / NET: 1257 mL        LABS:                        9.4    9.5   )-----------( 363      ( 25 Jan 2019 22:27 )             32.8     01-25    144  |  113<H>  |  16  ----------------------------<  78  3.7   |  25  |  0.52    Ca    7.0<L>      25 Jan 2019 07:11    TPro  5.0<L>  /  Alb  1.1<L>  /  TBili  0.2  /  DBili  x   /  AST  34  /  ALT  37  /  AlkPhos  161<H>  01-24    PT/INR - ( 24 Jan 2019 18:41 )   PT: 23.8 sec;   INR: 2.10 ratio         PTT - ( 25 Jan 2019 22:27 )  PTT:42.3 sec    CAPILLARY BLOOD GLUCOSE                  MEDICATIONS  (STANDING):  dextrose 5% + sodium chloride 0.45%. 1000 milliLiter(s) (150 mL/Hr) IV Continuous <Continuous>  heparin  Infusion. 800 Unit(s)/Hr (8 mL/Hr) IV Continuous <Continuous>  pantoprazole  Injectable 40 milliGRAM(s) IV Push two times a day  piperacillin/tazobactam IVPB. 3.375 Gram(s) IV Intermittent every 8 hours    MEDICATIONS  (PRN):      RADIOLOGY & ADDITIONAL TESTS:    Imaging Personally Reviewed:  [ ] YES  [ ] NO    REVIEW OF SYSTEMS:  deucem sum to fahad     Consultant(s) Notes Reviewed:  [ x ] YES  [ ] NO    PHYSICAL EXAM:  GENERAL: NAD, well-groomed, well-developed  HEAD:  Atraumatic, Normocephalic  EYES: EOMI, PERRLA, conjunctiva and sclera clear  ENMT: No tonsillar erythema, exudates, or enlargement; Moist mucous membranes, Good dentition, No lesions  NECK: Supple, No JVD, Normal thyroid  NERVOUS SYSTEM:  Alert & Oriented X3, Good concentration; Motor Strength 5/5 B/L upper and lower extremities; DTRs 2+ intact and symmetric  CHEST/LUNG: Clear to percussion bilaterally; No rales, rhonchi, wheezing, or rubs  HEART: Regular rate and rhythm; No murmurs, rubs, or gallops  ABDOMEN: distended   EXTREMITIES:  2+ Peripheral Pulses, No clubbing, cyanosis, or edema  LYMPH: No lymphadenopathy noted  SKIN: No rashes or lesions    Care Discussed with Consultants/Other Providers [ x] YES  [ ] NO

## 2019-02-01 NOTE — PROGRESS NOTE ADULT - ATTENDING COMMENTS
As above  Afebrile  tolerating diet    Abd  softly distended, + BM     tolerating diet    Imp  SBO resolved,  Being treatd for PE/DVT, has IVC in place    Plan  Form surgical point, may be discharged As above  Afebrile  tolerating diet    Abd  softly distended, + BM     tolerating diet    Imp  SBO resolved,  Being treated for PE/DVT, has IVC in place    Plan  Form surgical point, may be discharged

## 2019-02-01 NOTE — PROGRESS NOTE ADULT - PROBLEM SELECTOR PROBLEM 2
SBO (small bowel obstruction)
Pulmonary thromboembolism
SBO (small bowel obstruction)
DVT (deep venous thrombosis)

## 2019-02-01 NOTE — PROGRESS NOTE ADULT - PROBLEM SELECTOR PLAN 1
and DVT  on heparin drip, s/p filter  if she is not to go for surgery, will restart xarelto  await hypercoag w/u
bilat DVT at legs plus PE,  recurrent  on heparin now  IVC filter placed  ?go back to xarelto?  will do some hypercoag w/u
on heparin  lupus w/u was not sent  request made again  IVC filter placed
1) npo, IVF  2) ngt to cont lws  3) possible OR if pt does not improve today  4) Dr. Guerrero present during exam
she had recurrent DVT and PE while off xarelto  there is no failure of xarelto  await Lupus w/u  she is on lovenox now  but I think she can go back to xarelto
Diet as tolerated  d/c planning

## 2019-02-19 ENCOUNTER — APPOINTMENT (OUTPATIENT)
Dept: SURGERY | Facility: CLINIC | Age: 64
End: 2019-02-19
Payer: MEDICAID

## 2019-02-19 ENCOUNTER — INPATIENT (INPATIENT)
Facility: HOSPITAL | Age: 64
LOS: 7 days | Discharge: HOME | DRG: 844 | End: 2019-02-27
Attending: INTERNAL MEDICINE | Admitting: INTERNAL MEDICINE
Payer: MEDICAID

## 2019-02-19 VITALS
WEIGHT: 175.05 LBS | RESPIRATION RATE: 24 BRPM | HEART RATE: 105 BPM | HEIGHT: 66 IN | OXYGEN SATURATION: 98 % | TEMPERATURE: 98 F

## 2019-02-19 DIAGNOSIS — K21.9 GASTRO-ESOPHAGEAL REFLUX DISEASE WITHOUT ESOPHAGITIS: ICD-10-CM

## 2019-02-19 DIAGNOSIS — I26.99 OTHER PULMONARY EMBOLISM WITHOUT ACUTE COR PULMONALE: ICD-10-CM

## 2019-02-19 DIAGNOSIS — Z29.9 ENCOUNTER FOR PROPHYLACTIC MEASURES, UNSPECIFIED: ICD-10-CM

## 2019-02-19 DIAGNOSIS — R60.1 GENERALIZED EDEMA: ICD-10-CM

## 2019-02-19 DIAGNOSIS — Z98.890 OTHER SPECIFIED POSTPROCEDURAL STATES: Chronic | ICD-10-CM

## 2019-02-19 DIAGNOSIS — K56.609 UNSPECIFIED INTESTINAL OBSTRUCTION, UNSPECIFIED AS TO PARTIAL VERSUS COMPLETE OBSTRUCTION: ICD-10-CM

## 2019-02-19 DIAGNOSIS — D64.9 ANEMIA, UNSPECIFIED: ICD-10-CM

## 2019-02-19 LAB
ALBUMIN SERPL ELPH-MCNC: 0.9 G/DL — LOW (ref 3.5–5)
ALP SERPL-CCNC: 120 U/L — SIGNIFICANT CHANGE UP (ref 40–120)
ALT FLD-CCNC: 39 U/L DA — SIGNIFICANT CHANGE UP (ref 10–60)
ANION GAP SERPL CALC-SCNC: 4 MMOL/L — LOW (ref 5–17)
APTT BLD: 21.9 SEC — LOW (ref 27.5–36.3)
AST SERPL-CCNC: 48 U/L — HIGH (ref 10–40)
BASOPHILS # BLD AUTO: 0.08 K/UL — SIGNIFICANT CHANGE UP (ref 0–0.2)
BASOPHILS NFR BLD AUTO: 0.9 % — SIGNIFICANT CHANGE UP (ref 0–2)
BILIRUB DIRECT SERPL-MCNC: 0.1 MG/DL — SIGNIFICANT CHANGE UP (ref 0–0.2)
BILIRUB INDIRECT FLD-MCNC: 0.1 MG/DL — LOW (ref 0.2–1)
BILIRUB SERPL-MCNC: 0.2 MG/DL — SIGNIFICANT CHANGE UP (ref 0.2–1.2)
BILIRUB SERPL-MCNC: 0.2 MG/DL — SIGNIFICANT CHANGE UP (ref 0.2–1.2)
BUN SERPL-MCNC: 25 MG/DL — HIGH (ref 7–18)
CALCIUM SERPL-MCNC: 7.2 MG/DL — LOW (ref 8.4–10.5)
CHLORIDE SERPL-SCNC: 115 MMOL/L — HIGH (ref 96–108)
CO2 SERPL-SCNC: 25 MMOL/L — SIGNIFICANT CHANGE UP (ref 22–31)
CREAT SERPL-MCNC: 0.74 MG/DL — SIGNIFICANT CHANGE UP (ref 0.5–1.3)
EOSINOPHIL # BLD AUTO: 0.12 K/UL — SIGNIFICANT CHANGE UP (ref 0–0.5)
EOSINOPHIL NFR BLD AUTO: 1.4 % — SIGNIFICANT CHANGE UP (ref 0–6)
GLUCOSE SERPL-MCNC: 72 MG/DL — SIGNIFICANT CHANGE UP (ref 70–99)
HCT VFR BLD CALC: 29.3 % — LOW (ref 34.5–45)
HGB BLD-MCNC: 9 G/DL — LOW (ref 11.5–15.5)
IMM GRANULOCYTES NFR BLD AUTO: 0.2 % — SIGNIFICANT CHANGE UP (ref 0–1.5)
INR BLD: 1.74 RATIO — HIGH (ref 0.88–1.16)
LYMPHOCYTES # BLD AUTO: 2.38 K/UL — SIGNIFICANT CHANGE UP (ref 1–3.3)
LYMPHOCYTES # BLD AUTO: 27.7 % — SIGNIFICANT CHANGE UP (ref 13–44)
MCHC RBC-ENTMCNC: 28.3 PG — SIGNIFICANT CHANGE UP (ref 27–34)
MCHC RBC-ENTMCNC: 30.7 GM/DL — LOW (ref 32–36)
MCV RBC AUTO: 92.1 FL — SIGNIFICANT CHANGE UP (ref 80–100)
MONOCYTES # BLD AUTO: 0.8 K/UL — SIGNIFICANT CHANGE UP (ref 0–0.9)
MONOCYTES NFR BLD AUTO: 9.3 % — SIGNIFICANT CHANGE UP (ref 2–14)
NEUTROPHILS # BLD AUTO: 5.18 K/UL — SIGNIFICANT CHANGE UP (ref 1.8–7.4)
NEUTROPHILS NFR BLD AUTO: 60.5 % — SIGNIFICANT CHANGE UP (ref 43–77)
NRBC # BLD: 0 /100 WBCS — SIGNIFICANT CHANGE UP (ref 0–0)
NT-PROBNP SERPL-SCNC: 164 PG/ML — HIGH (ref 0–125)
PLATELET # BLD AUTO: 339 K/UL — SIGNIFICANT CHANGE UP (ref 150–400)
POTASSIUM SERPL-MCNC: 4.3 MMOL/L — SIGNIFICANT CHANGE UP (ref 3.5–5.3)
POTASSIUM SERPL-SCNC: 4.3 MMOL/L — SIGNIFICANT CHANGE UP (ref 3.5–5.3)
PROT SERPL-MCNC: 4.6 G/DL — LOW (ref 6–8.3)
PROTHROM AB SERPL-ACNC: 19.6 SEC — HIGH (ref 10–12.9)
RBC # BLD: 3.18 M/UL — LOW (ref 3.8–5.2)
RBC # FLD: 24.6 % — HIGH (ref 10.3–14.5)
SODIUM SERPL-SCNC: 144 MMOL/L — SIGNIFICANT CHANGE UP (ref 135–145)
TROPONIN I SERPL-MCNC: <0.015 NG/ML — SIGNIFICANT CHANGE UP (ref 0–0.04)
WBC # BLD: 8.58 K/UL — SIGNIFICANT CHANGE UP (ref 3.8–10.5)
WBC # FLD AUTO: 8.58 K/UL — SIGNIFICANT CHANGE UP (ref 3.8–10.5)

## 2019-02-19 PROCEDURE — 71045 X-RAY EXAM CHEST 1 VIEW: CPT | Mod: 26

## 2019-02-19 PROCEDURE — 76705 ECHO EXAM OF ABDOMEN: CPT | Mod: 26,76

## 2019-02-19 PROCEDURE — 99213 OFFICE O/P EST LOW 20 MIN: CPT

## 2019-02-19 PROCEDURE — 99284 EMERGENCY DEPT VISIT MOD MDM: CPT

## 2019-02-19 PROCEDURE — 74018 RADEX ABDOMEN 1 VIEW: CPT | Mod: 26

## 2019-02-19 RX ORDER — FUROSEMIDE 40 MG
60 TABLET ORAL ONCE
Qty: 0 | Refills: 0 | Status: COMPLETED | OUTPATIENT
Start: 2019-02-19 | End: 2019-02-19

## 2019-02-19 RX ORDER — ALBUMIN HUMAN 25 %
25 VIAL (ML) INTRAVENOUS
Qty: 0 | Refills: 0 | Status: COMPLETED | OUTPATIENT
Start: 2019-02-19 | End: 2019-02-19

## 2019-02-19 RX ORDER — RIVAROXABAN 15 MG-20MG
20 KIT ORAL EVERY 24 HOURS
Qty: 0 | Refills: 0 | Status: DISCONTINUED | OUTPATIENT
Start: 2019-02-20 | End: 2019-02-27

## 2019-02-19 RX ORDER — FOLIC ACID 0.8 MG
1 TABLET ORAL DAILY
Qty: 0 | Refills: 0 | Status: DISCONTINUED | OUTPATIENT
Start: 2019-02-19 | End: 2019-02-27

## 2019-02-19 RX ORDER — AZITHROMYCIN 500 MG/1
TABLET, FILM COATED ORAL
Qty: 0 | Refills: 0 | Status: DISCONTINUED | OUTPATIENT
Start: 2019-02-19 | End: 2019-02-22

## 2019-02-19 RX ORDER — AZITHROMYCIN 500 MG/1
500 TABLET, FILM COATED ORAL ONCE
Qty: 0 | Refills: 0 | Status: COMPLETED | OUTPATIENT
Start: 2019-02-19 | End: 2019-02-19

## 2019-02-19 RX ORDER — CEFTRIAXONE 500 MG/1
1 INJECTION, POWDER, FOR SOLUTION INTRAMUSCULAR; INTRAVENOUS EVERY 24 HOURS
Qty: 0 | Refills: 0 | Status: DISCONTINUED | OUTPATIENT
Start: 2019-02-20 | End: 2019-02-22

## 2019-02-19 RX ORDER — AZITHROMYCIN 500 MG/1
500 TABLET, FILM COATED ORAL EVERY 24 HOURS
Qty: 0 | Refills: 0 | Status: DISCONTINUED | OUTPATIENT
Start: 2019-02-20 | End: 2019-02-22

## 2019-02-19 RX ORDER — PANTOPRAZOLE SODIUM 20 MG/1
40 TABLET, DELAYED RELEASE ORAL
Qty: 0 | Refills: 0 | Status: DISCONTINUED | OUTPATIENT
Start: 2019-02-19 | End: 2019-02-27

## 2019-02-19 RX ORDER — CEFTRIAXONE 500 MG/1
INJECTION, POWDER, FOR SOLUTION INTRAMUSCULAR; INTRAVENOUS
Qty: 0 | Refills: 0 | Status: DISCONTINUED | OUTPATIENT
Start: 2019-02-19 | End: 2019-02-22

## 2019-02-19 RX ORDER — CEFTRIAXONE 500 MG/1
1 INJECTION, POWDER, FOR SOLUTION INTRAMUSCULAR; INTRAVENOUS ONCE
Qty: 0 | Refills: 0 | Status: COMPLETED | OUTPATIENT
Start: 2019-02-19 | End: 2019-02-19

## 2019-02-19 RX ORDER — FUROSEMIDE 40 MG
40 TABLET ORAL EVERY 12 HOURS
Qty: 0 | Refills: 0 | Status: DISCONTINUED | OUTPATIENT
Start: 2019-02-20 | End: 2019-02-22

## 2019-02-19 RX ADMIN — AZITHROMYCIN 250 MILLIGRAM(S): 500 TABLET, FILM COATED ORAL at 23:52

## 2019-02-19 RX ADMIN — Medication 25 MILLILITER(S): at 23:55

## 2019-02-19 RX ADMIN — Medication 60 MILLIGRAM(S): at 23:51

## 2019-02-19 RX ADMIN — CEFTRIAXONE 100 GRAM(S): 500 INJECTION, POWDER, FOR SOLUTION INTRAMUSCULAR; INTRAVENOUS at 23:52

## 2019-02-19 NOTE — H&P ADULT - PROBLEM SELECTOR PLAN 6
IMPROVE VTE Individual Risk Assessment          RISK                                                          Points  [x  ] Previous VTE                                                3  [  ] Thrombophilia                                             2  [  ] Lower limb paralysis                                   2        (unable to hold up >15 seconds)    [  ] Current Cancer                                             2         (within 6 months)  [ x ] Immobilization > 24 hrs                              1  [  ] ICU/CCU stay > 24 hours                             1  [ x ] Age > 60                                                         1    IMPROVE VTE Score: 5   On Xarelto , will continue h/o RINKU on oral iron   Has occasional hemorrhoidal bleeding   f/u cbc daily

## 2019-02-19 NOTE — ED PROVIDER NOTE - OBJECTIVE STATEMENT
Pt is a 64 y/o F, with PMHx of DVT, HTN, and prior SBO, presenting to the ED with SOB, extremity swelling, and abd distension. [facility], and DVT (on xarelto) and placed on lasix. Despite taking the diuretic, pt states she is developing progressively worsening swelling in her extremities and abdominal distension since her discharge. Went to her PMD regarding sxs, had some blood work showing low protein and anemia, and sent to the ED for eval. Pt states she has been feeling SOB for the last two days with daughter noticing some wheezing.  She denies pain in her legs but states that her legs feel very tense. Denies CP and fever. She reports hx of abd distension in the past but staets this episode is significantly worse.  Sure- PMD

## 2019-02-19 NOTE — ED PROVIDER NOTE - CLINICAL SUMMARY MEDICAL DECISION MAKING FREE TEXT BOX
labs, EKG, lasix, and admit labs, EKG, lasix, and admit  labs with low albumin, otherwise no significant abnormality. cxr read as PNA- but no fevers, no cough, no sob. will admit for fluid overload.

## 2019-02-19 NOTE — H&P ADULT - NSHPPHYSICALEXAM_GEN_ALL_CORE
PHYSICAL EXAM:  GENERAL: lying in bed , no acute distress   HEENT: Normocephalic;  conjunctivae and sclerae clear; moist mucous membranes;   NECK : supple  CHEST/LUNG: Reduced breath sounds more on Left side   HEART: S1 S2  regular; no murmurs, gallops or rubs  ABDOMEN: Soft, mild tenderness all over , moderate distended; Bowel sounds present  EXTREMITIES: no cyanosis; b/l upper and lower extremity pitting edema 2+; no calf tenderness  SKIN: warm and dry; no rash  NERVOUS SYSTEM:  Awake and alert; Oriented  to place, person and time ; no new deficits

## 2019-02-19 NOTE — H&P ADULT - PROBLEM SELECTOR PLAN 2
On Xarelto , will continue Patient reports sob with cough with sputum * 1 day with reduced breath sounds on exam   CXR reports left retrocardiac opacity   Symptoms could just be from anasarca , however will start on abx empirically   Rocephin , Zithro   Obtain RVP   f/u procalcitonin   Dc abx in negative

## 2019-02-19 NOTE — H&P ADULT - PROBLEM SELECTOR PLAN 4
Stable, will f/u Xray abdomen   Tolerating diet On Protonix   Last EGD / Colonoscopy : Esophagitis , gastritis , diverticulosis

## 2019-02-19 NOTE — ED ADULT NURSE NOTE - FINAL NURSING ELECTRONIC SIGNATURE
RESOLVED  Patient was treated for bronchitis with Zithromax and prednisone - suspect GI irritation related to medications (likely antibiotics)    She presented with dehydration, N/V, hypokalemia, hypothermia  · Trop normal, lactic acidosis resolved, leukocytosis resolved  · Procalcitonin NORMAL: CT chest/abdomen and pelvis were negative for obstruction or infection, Urinalysis is normal  · Lipase levels normal  · Antiemetics as needed - hasn't utilized >24 hrs  · Meclizine for vertigo, h/o same  · Advance diet as tolerated - doing well  · Weakness - likely related to illness and decreased oral intake - she is agreeable to home PT  · Face-to-face completed for nursing, aide, and PT 19-Feb-2019 20:34

## 2019-02-19 NOTE — H&P ADULT - HISTORY OF PRESENT ILLNESS
63 Female from home with PMH of Recurrent SBO's, s/p exp lap, SB resection,  DVT ( Left UE , then Left LE) , PE , chronic leg swelling , comes in with complaint of generalized body swelling and sob. Patient reports having bilateral le swelling for a long time now ( year or so) , however has been having fluid retention in abdomen and b/l upper extremities ever since she was discharged from [facility] in January. Patient reports sob on exertion, even walking from room to bathroom ( [facility] 3), no orthopnea , no PND. Denies any chest pain , palpitations, nausea , vomiting , diarrhea , abdominal pain. Reports having cough with whitish sputum yesterday in addition to some wheezing. No h/o Lung dz, liver dz, no malignancy, no recent weight loss or loss of apetite.   Reports having limited mobilization. No h/o hepatitis , no family history of cancer. No sick contacts / no Tb exposure , travelled to [place] in December.   Reports having minor streaks of rectal bleeding on and off from hemorrhoids.   Last Mammogram and PAP 2 years ago : wNL; Last Colonoscopy in April 2018 : no cancer/ polyp    In Ed , BP: 119/77 mm hg , Hr: 105 , Temp : 98.1 F  Cbc   Cmp shows elevated bun , albumin of 0.9   T1 negative   CXR shows Left sided pleural effusion with likely left cardiac opacity.

## 2019-02-19 NOTE — ED ADULT NURSE NOTE - ED STAT RN HANDOFF DETAILS
bedside report given to rn rohan - pt has no icv access - dr Quesada is attempting to get a iv via u/s

## 2019-02-19 NOTE — H&P ADULT - PROBLEM SELECTOR PLAN 1
Comes in with generalized anasarca, extremities , abdomen with mild sob , otherwise comfortable appearing on exam   Likely from hypoalbuminemia , 0.9 ( 1.1 in last admission )  Could be from chronic state of immobilization, poor po intake in past due to sbo  No known heart failure, no JVD on exam , pro-bnp not so high (164)  No known liver disease, lfts WNL   No known malignancy or risk factors , outpatient screening negative for cancer   No exposure to TB/ no night sweats / weight loss   On lasix 40 bid at home   Will start on lasix 40 iv bid at home , in addition to albumin   Cain catheter to monitor uop   I&Os strict   CXR as needed   Echocardiogram   US abdomen  May benefit from paracentesis for symptom relief in future if does not respond to lasix , would need to be on heparin drip with reversal of inr for the same , will continue with Xarelto for now Comes in with generalized anasarca, extremities , abdomen with mild sob , otherwise comfortable appearing on exam   Likely from hypoalbuminemia , 0.9 ( 1.1 in last admission )  Could be from chronic state of immobilization, poor po intake in past due to sbo  No known heart failure, no JVD on exam , pro-bnp not so high (164)  No known liver disease, lfts WNL   No known malignancy or risk factors , outpatient screening negative for cancer   No exposure to TB/ no night sweats / weight loss   On lasix 40 bid at home   Will start on lasix 40 iv bid at home , in addition to albumin   Cain catheter to monitor uop   I&Os strict   CXR as needed   Echocardiogram   US abdomen  f/u TSH  May benefit from paracentesis for symptom relief in future if does not respond to lasix , would need to be on heparin drip with reversal of inr for the same , will continue with Xarelto for now Comes in with generalized anasarca, extremities , abdomen with mild sob , otherwise comfortable appearing on exam   Likely from hypoalbuminemia , 0.9 ( 1.1 in last admission )  Could be from chronic state of immobilization, poor po intake in past due to sbo  No known heart failure, no JVD on exam , pro-bnp not so high (164)  No known liver disease, lfts WNL   No known malignancy or risk factors , outpatient screening negative for cancer   No exposure to TB/ no night sweats / weight loss   On lasix 40 bid at home   Will start on lasix 40 iv bid at home , in addition to albumin   Cain catheter to monitor uop   I&Os strict   CXR as needed   Echocardiogram to r/o hf / pulm htn / pericardial effusion ( low voltage EKG )   US abdomen  f/u TSH ( Severe hypothyroidism)  UA to r/o any proteinuria , however normal creatinine   Consider Pelvic Sono if all other tests inconclusive   May benefit from paracentesis for symptom relief in future if does not respond to lasix , would need to be on heparin drip with reversal of inr for the same , will continue with Xarelto for now

## 2019-02-19 NOTE — H&P ADULT - PROBLEM SELECTOR PLAN 5
h/o RINKU on oral iron   Has occasional hemorrhoidal bleeding   f/u cbc daily Stable, will f/u Xray abdomen   Tolerating diet

## 2019-02-19 NOTE — ED ADULT NURSE REASSESSMENT NOTE - NS ED NURSE REASSESS COMMENT FT1
Pt wasn't able to obtain an IV since the earlier shift. Dr Quesada attempting at this time. Pt pending medication administration.

## 2019-02-19 NOTE — H&P ADULT - ASSESSMENT
63 Female from home with PMH of Recurrent SBO's, s/p exp lap, SB resection,  DVT ( Left UE , then Left LE) , PE , chronic leg swelling , comes in with complaint of generalized body swelling and sob. Patient reports having bilateral le swelling for a long time now ( year or so) , however has been having fluid retention in abdomen and b/l upper extremities ever since she was discharged from [facility] in January. Patient reports sob on exertion, even walking from room to bathroom ( [facility] 3), no orthopnea , no PND. Denies any chest pain , palpitations, nausea , vomiting , diarrhea , abdominal pain. Reports having cough with whitish sputum yesterday in addition to some wheezing. No h/o Lung dz, liver dz, no malignancy, no recent weight loss or loss of apetite.   Reports having limited mobilization. No h/o hepatitis , no family history of cancer. No sick contacts / no Tb exposure , travelled to [place] in December.   Reports having minor streaks of rectal bleeding on and off from hemorrhoids.   Last Mammogram and PAP 2 years ago : wNL; Last Colonoscopy in April 2018 : no cancer/ polyp    In Ed , BP: 119/77 mm hg , Hr: 105 , Temp : 98.1 F  Cbc   Cmp shows elevated bun , albumin of 0.9   T1 negative   CXR shows Left sided pleural effusion with likely left cardiac opacity.    Will admit to medicine for Anasarca.

## 2019-02-19 NOTE — H&P ADULT - NSHPLABSRESULTS_GEN_ALL_CORE
02-19    144  |  115<H>  |  25<H>  ----------------------------<  72  4.3   |  25  |  0.74    Ca    7.2<L>      19 Feb 2019 13:04    TPro  4.6<L>  /  Alb  0.9<L>  /  TBili  0.2  /  DBili  0.1  /  AST  48<H>  /  ALT  39  /  AlkPhos  120  02-19                  PT/INR - ( 19 Feb 2019 13:04 )   PT: 19.6 sec;   INR: 1.74 ratio         PTT - ( 19 Feb 2019 13:04 )  PTT:21.9 sec    Lactate Trend      CARDIAC MARKERS ( 19 Feb 2019 13:04 )  <0.015 ng/mL / x     / x     / x     / x            CAPILLARY BLOOD GLUCOSE

## 2019-02-19 NOTE — ED ADULT NURSE NOTE - NSIMPLEMENTINTERV_GEN_ALL_ED
Implemented All Universal Safety Interventions:  [place] to call system. Call bell, personal items and telephone within reach. Instruct patient to call for assistance. Room bathroom lighting operational. Non-slip footwear when patient is off stretcher. Physically safe environment: no spills, clutter or unnecessary equipment. Stretcher in lowest position, wheels locked, appropriate side rails in place.

## 2019-02-19 NOTE — H&P ADULT - PROBLEM SELECTOR PLAN 3
On Protonix   Last EGD / Colonoscopy : Esophagitis , gastritis , diverticulosis On Xarelto , will continue

## 2019-02-19 NOTE — H&P ADULT - PROBLEM SELECTOR PLAN 7
IMPROVE VTE Individual Risk Assessment          RISK                                                          Points  [x  ] Previous VTE                                                3  [  ] Thrombophilia                                             2  [  ] Lower limb paralysis                                   2        (unable to hold up >15 seconds)    [  ] Current Cancer                                             2         (within 6 months)  [ x ] Immobilization > 24 hrs                              1  [  ] ICU/CCU stay > 24 hours                             1  [ x ] Age > 60                                                         1    IMPROVE VTE Score: 5   On Xarelto , will continue

## 2019-02-20 DIAGNOSIS — I82.409 ACUTE EMBOLISM AND THROMBOSIS OF UNSPECIFIED DEEP VEINS OF UNSPECIFIED LOWER EXTREMITY: ICD-10-CM

## 2019-02-20 PROBLEM — I10 ESSENTIAL (PRIMARY) HYPERTENSION: Chronic | Status: INACTIVE | Noted: 2017-04-04 | Resolved: 2019-02-19

## 2019-02-20 PROBLEM — K59.00 CONSTIPATION, UNSPECIFIED: Chronic | Status: INACTIVE | Noted: 2018-04-10 | Resolved: 2019-02-19

## 2019-02-20 LAB
ALBUMIN SERPL ELPH-MCNC: 1.4 G/DL — LOW (ref 3.5–5)
ALP SERPL-CCNC: 131 U/L — HIGH (ref 40–120)
ALT FLD-CCNC: 38 U/L DA — SIGNIFICANT CHANGE UP (ref 10–60)
ANION GAP SERPL CALC-SCNC: 9 MMOL/L — SIGNIFICANT CHANGE UP (ref 5–17)
APPEARANCE UR: CLEAR — SIGNIFICANT CHANGE UP
AST SERPL-CCNC: 26 U/L — SIGNIFICANT CHANGE UP (ref 10–40)
BASOPHILS # BLD AUTO: 0.04 K/UL — SIGNIFICANT CHANGE UP (ref 0–0.2)
BASOPHILS NFR BLD AUTO: 0.6 % — SIGNIFICANT CHANGE UP (ref 0–2)
BILIRUB SERPL-MCNC: 0.4 MG/DL — SIGNIFICANT CHANGE UP (ref 0.2–1.2)
BILIRUB UR-MCNC: NEGATIVE — SIGNIFICANT CHANGE UP
BUN SERPL-MCNC: 22 MG/DL — HIGH (ref 7–18)
CALCIUM SERPL-MCNC: 7.2 MG/DL — LOW (ref 8.4–10.5)
CHLORIDE SERPL-SCNC: 112 MMOL/L — HIGH (ref 96–108)
CHOLEST SERPL-MCNC: 53 MG/DL — SIGNIFICANT CHANGE UP (ref 10–199)
CO2 SERPL-SCNC: 24 MMOL/L — SIGNIFICANT CHANGE UP (ref 22–31)
COLOR SPEC: YELLOW — SIGNIFICANT CHANGE UP
CREAT SERPL-MCNC: 0.67 MG/DL — SIGNIFICANT CHANGE UP (ref 0.5–1.3)
DIFF PNL FLD: NEGATIVE — SIGNIFICANT CHANGE UP
EOSINOPHIL # BLD AUTO: 0.11 K/UL — SIGNIFICANT CHANGE UP (ref 0–0.5)
EOSINOPHIL NFR BLD AUTO: 1.5 % — SIGNIFICANT CHANGE UP (ref 0–6)
FLU A RESULT: SIGNIFICANT CHANGE UP
FLU A RESULT: SIGNIFICANT CHANGE UP
FLUAV AG NPH QL: SIGNIFICANT CHANGE UP
FLUBV AG NPH QL: SIGNIFICANT CHANGE UP
GLUCOSE SERPL-MCNC: 72 MG/DL — SIGNIFICANT CHANGE UP (ref 70–99)
GLUCOSE UR QL: NEGATIVE — SIGNIFICANT CHANGE UP
HCT VFR BLD CALC: 30 % — LOW (ref 34.5–45)
HCV AB S/CO SERPL IA: 0.17 S/CO — SIGNIFICANT CHANGE UP (ref 0–0.79)
HCV AB SERPL-IMP: SIGNIFICANT CHANGE UP
HDLC SERPL-MCNC: 29 MG/DL — LOW
HGB BLD-MCNC: 9.2 G/DL — LOW (ref 11.5–15.5)
IMM GRANULOCYTES NFR BLD AUTO: 0.4 % — SIGNIFICANT CHANGE UP (ref 0–1.5)
KETONES UR-MCNC: NEGATIVE — SIGNIFICANT CHANGE UP
LEUKOCYTE ESTERASE UR-ACNC: NEGATIVE — SIGNIFICANT CHANGE UP
LIPID PNL WITH DIRECT LDL SERPL: 15 MG/DL — SIGNIFICANT CHANGE UP
LYMPHOCYTES # BLD AUTO: 1.92 K/UL — SIGNIFICANT CHANGE UP (ref 1–3.3)
LYMPHOCYTES # BLD AUTO: 26.7 % — SIGNIFICANT CHANGE UP (ref 13–44)
MAGNESIUM SERPL-MCNC: 1.6 MG/DL — SIGNIFICANT CHANGE UP (ref 1.6–2.6)
MCHC RBC-ENTMCNC: 28.2 PG — SIGNIFICANT CHANGE UP (ref 27–34)
MCHC RBC-ENTMCNC: 30.7 GM/DL — LOW (ref 32–36)
MCV RBC AUTO: 92 FL — SIGNIFICANT CHANGE UP (ref 80–100)
MONOCYTES # BLD AUTO: 0.65 K/UL — SIGNIFICANT CHANGE UP (ref 0–0.9)
MONOCYTES NFR BLD AUTO: 9.1 % — SIGNIFICANT CHANGE UP (ref 2–14)
NEUTROPHILS # BLD AUTO: 4.43 K/UL — SIGNIFICANT CHANGE UP (ref 1.8–7.4)
NEUTROPHILS NFR BLD AUTO: 61.7 % — SIGNIFICANT CHANGE UP (ref 43–77)
NITRITE UR-MCNC: NEGATIVE — SIGNIFICANT CHANGE UP
NRBC # BLD: 0 /100 WBCS — SIGNIFICANT CHANGE UP (ref 0–0)
PH UR: 6 — SIGNIFICANT CHANGE UP (ref 5–8)
PHOSPHATE SERPL-MCNC: 2.6 MG/DL — SIGNIFICANT CHANGE UP (ref 2.5–4.5)
PLATELET # BLD AUTO: 321 K/UL — SIGNIFICANT CHANGE UP (ref 150–400)
POTASSIUM SERPL-MCNC: 4.1 MMOL/L — SIGNIFICANT CHANGE UP (ref 3.5–5.3)
POTASSIUM SERPL-SCNC: 4.1 MMOL/L — SIGNIFICANT CHANGE UP (ref 3.5–5.3)
PROCALCITONIN SERPL-MCNC: 0.05 NG/ML — SIGNIFICANT CHANGE UP (ref 0.02–0.1)
PROCALCITONIN SERPL-MCNC: 0.06 NG/ML — SIGNIFICANT CHANGE UP (ref 0.02–0.1)
PROT SERPL-MCNC: 5.2 G/DL — LOW (ref 6–8.3)
PROT UR-MCNC: NEGATIVE — SIGNIFICANT CHANGE UP
RBC # BLD: 3.26 M/UL — LOW (ref 3.8–5.2)
RBC # FLD: 24.7 % — HIGH (ref 10.3–14.5)
RSV RESULT: SIGNIFICANT CHANGE UP
RSV RNA RESP QL NAA+PROBE: SIGNIFICANT CHANGE UP
SODIUM SERPL-SCNC: 145 MMOL/L — SIGNIFICANT CHANGE UP (ref 135–145)
SP GR SPEC: 1 — LOW (ref 1.01–1.02)
TOTAL CHOLESTEROL/HDL RATIO MEASUREMENT: 1.8 RATIO — LOW (ref 3.3–7.1)
TRIGL SERPL-MCNC: 43 MG/DL — SIGNIFICANT CHANGE UP (ref 10–149)
TSH SERPL-MCNC: 1.97 UU/ML — SIGNIFICANT CHANGE UP (ref 0.34–4.82)
UROBILINOGEN FLD QL: NEGATIVE — SIGNIFICANT CHANGE UP
WBC # BLD: 7.18 K/UL — SIGNIFICANT CHANGE UP (ref 3.8–10.5)
WBC # FLD AUTO: 7.18 K/UL — SIGNIFICANT CHANGE UP (ref 3.8–10.5)

## 2019-02-20 PROCEDURE — 93970 EXTREMITY STUDY: CPT | Mod: 26

## 2019-02-20 RX ORDER — SPIRONOLACTONE 25 MG/1
25 TABLET, FILM COATED ORAL DAILY
Qty: 0 | Refills: 0 | Status: DISCONTINUED | OUTPATIENT
Start: 2019-02-20 | End: 2019-02-27

## 2019-02-20 RX ADMIN — Medication 40 MILLIGRAM(S): at 05:11

## 2019-02-20 RX ADMIN — PANTOPRAZOLE SODIUM 40 MILLIGRAM(S): 20 TABLET, DELAYED RELEASE ORAL at 07:10

## 2019-02-20 RX ADMIN — AZITHROMYCIN 250 MILLIGRAM(S): 500 TABLET, FILM COATED ORAL at 22:56

## 2019-02-20 RX ADMIN — Medication 40 MILLIGRAM(S): at 18:37

## 2019-02-20 RX ADMIN — Medication 1 MILLIGRAM(S): at 12:24

## 2019-02-20 RX ADMIN — CEFTRIAXONE 100 GRAM(S): 500 INJECTION, POWDER, FOR SOLUTION INTRAMUSCULAR; INTRAVENOUS at 22:55

## 2019-02-20 NOTE — ED ADULT NURSE REASSESSMENT NOTE - NS ED NURSE REASSESS COMMENT FT1
Patient remains hemodynamically stable and in no acute distress . Patient verbalizes no medical complaints. Patient urine output was 2400 after lasix administration.

## 2019-02-20 NOTE — PROGRESS NOTE ADULT - ASSESSMENT
63 Female from home with PMH of Recurrent SBO's, s/p exp lap, SB resection,  DVT ( Left UE , then Left LE) , PE , chronic leg swelling , comes in with complaints of generalized body swelling and sob. Recently discharged from [facility] in January. Patient reports sob on exertion, even walking from room to bathroom ( [facility] 3), no orthopnea , no PND. Reports having minor streaks of rectal bleeding on and off from hemorrhoids.     CXR shows Left sided pleural effusion with likely left cardiac opacity.

## 2019-02-20 NOTE — PROGRESS NOTE ADULT - SUBJECTIVE AND OBJECTIVE BOX
NP Note discussed with  Primary Attending    Patient is a 63y old  Female who presents with a chief complaint of sob , generalized body swelling (2019 13:45)      INTERVAL HPI/OVERNIGHT EVENTS: no new complaints    MEDICATIONS  (STANDING):  azithromycin  IVPB      azithromycin  IVPB 500 milliGRAM(s) IV Intermittent every 24 hours  cefTRIAXone   IVPB 1 Gram(s) IV Intermittent every 24 hours  cefTRIAXone   IVPB      folic acid 1 milliGRAM(s) Oral daily  furosemide   Injectable 40 milliGRAM(s) IV Push every 12 hours  pantoprazole    Tablet 40 milliGRAM(s) Oral before breakfast  rivaroxaban 20 milliGRAM(s) Oral every 24 hours  spironolactone 25 milliGRAM(s) Oral daily    MEDICATIONS  (PRN):  __________________________________________________  REVIEW OF SYSTEMS:    CONSTITUTIONAL: No fever,   EYES: no acute visual disturbances  NECK: No pain or stiffness  RESPIRATORY: No cough; No shortness of breath  CARDIOVASCULAR: No chest pain, no palpitations  GASTROINTESTINAL: No pain. No nausea or vomiting; No diarrhea   NEUROLOGICAL: No headache or numbness, no tremors  MUSCULOSKELETAL: No joint pain, no muscle pain  GENITOURINARY: no dysuria, no frequency, no hesitancy  PSYCHIATRY: no depression , no anxiety  ALL OTHER  ROS negative        Vital Signs Last 24 Hrs  T(C): 36.3 (2019 11:56), Max: 37.1 (2019 23:53)  T(F): 97.4 (2019 11:56), Max: 98.7 (2019 23:53)  HR: 87 (2019 11:56) (83 - 92)  BP: 103/62 (2019 11:56) (101/66 - 131/81)  BP(mean): --  RR: 16 (2019 11:56) (16 - 18)  SpO2: 100% (2019 11:56) (98% - 100%)    ________________________________________________  PHYSICAL EXAM:  GENERAL: NAD  HEENT: Normocephalic;  conjunctivae and sclerae clear; moist mucous membranes;   NECK : supple  CHEST/LUNG: Clear to auscultation bilaterally with good air entry   HEART: S1 S2  regular; no murmurs, gallops or rubs  ABDOMEN: large ascites, Soft, Nontender, Bowel sounds present  EXTREMITIES: +2 pitting edema no cyanosis; no edema; no calf tenderness  : Cain draining clear yellow urine to bedside   SKIN: warm and dry; no rash  NERVOUS SYSTEM:  Awake and alert; Oriented  to place, person and time ; no new deficits    _________________________________________________  LABS:                        9.2    7.18  )-----------( 321      ( 2019 07:08 )             30.0     02-20    145  |  112<H>  |  22<H>  ----------------------------<  72  4.1   |  24  |  0.67    Ca    7.2<L>      2019 07:08  Phos  2.6     -20  Mg     1.6     20    TPro  5.2<L>  /  Alb  1.4<L>  /  TBili  0.4  /  DBili  x   /  AST  26  /  ALT  38  /  AlkPhos  131<H>  02-20    PT/INR - ( 2019 13:04 )   PT: 19.6 sec;   INR: 1.74 ratio         PTT - ( 2019 13:04 )  PTT:21.9 sec  Urinalysis Basic - ( 2019 00:53 )    Color: Yellow / Appearance: Clear / S.005 / pH: x  Gluc: x / Ketone: Negative  / Bili: Negative / Urobili: Negative   Blood: x / Protein: Negative / Nitrite: Negative   Leuk Esterase: Negative / RBC: x / WBC x   Sq Epi: x / Non Sq Epi: x / Bacteria: x      CAPILLARY BLOOD GLUCOSE  RADIOLOGY & ADDITIONAL TESTS:    < from: US Duplex Venous Lower Ext Complete, Bilateral (19 @ 11:52) >  IMPRESSION:    DVT in the RIGHT popliteal vein and peroneal vein.    DVT from the LEFT common femoral vein to the calf.      < end of copied text >  < from: US Abdomen Limited (19 @ 17:33) >  IMPRESSION:    Large amount of ascites  Cholelithiasis that evidence of cholecystitis      < end of copied text >    Imaging  Reviewed:  YES/NO    Consultant(s) Notes Reviewed:   YES/ No      Plan of care was discussed with patient and /or primary care giver; all questions and concerns were addressed

## 2019-02-20 NOTE — CONSULT NOTE ADULT - SUBJECTIVE AND OBJECTIVE BOX
HPI:  ID consult was called to evaluate this 62 y/o female for possible left sided pneumonia. Patient presented to ED with c/o generalized body aches and SOB. CXR shows ?left sided pneumonia. Patient admits to some cough and whitish sputum production.     As per H&P:  63 Female from home with PMH of Recurrent SBO's, s/p exp lap, SB resection,  DVT ( Left UE , then Left LE) , PE , chronic leg swelling , comes in with complaint of generalized body swelling and sob. Patient reports having bilateral le swelling for a long time now ( year or so) , however has been having fluid retention in abdomen and b/l upper extremities ever since she was discharged from [facility] in January. Patient reports sob on exertion, even walking from room to bathroom ( [facility] 3), no orthopnea , no PND. Denies any chest pain , palpitations, nausea , vomiting , diarrhea , abdominal pain. Reports having cough with whitish sputum yesterday in addition to some wheezing. No h/o Lung dz, liver dz, no malignancy, no recent weight loss or loss of apetite. Reports having limited mobilization. No h/o hepatitis , no family history of cancer. No sick contacts / no Tb exposure , travelled to [place] in December.   Reports having minor streaks of rectal bleeding on and off from hemorrhoids. Last Mammogram and PAP 2 years ago : wNL; Last Colonoscopy in 2018 : no cancer/ polyp In Ed , BP: 119/77 mm hg , Hr: 105 , Temp : 98.1 F  Cbc   Cmp shows elevated bun , albumin of 0.9   T1 negative   CXR shows Left sided pleural effusion with likely left cardiac opacity. (2019 16:26)    REVIEW OF SYSTEMS:  [  ] Not able to illicit  General:	  Chest:	  GI:	  :  Skin:	  Musculoskeletal:	  Neuro:    PAST MEDICAL & SURGICAL HISTORY:  Pulmonary embolism  DVT (deep venous thrombosis)  SBO (small bowel obstruction)  History of intestinal surgery    ALLERGIES: No Known Allergies    MEDS:  azithromycin  IVPB 500 milliGRAM(s) IV Intermittent every 24 hours  cefTRIAXone   IVPB 1 Gram(s) IV Intermittent every 24 hours      folic acid 1 milliGRAM(s) Oral daily  furosemide   Injectable 40 milliGRAM(s) IV Push every 12 hours  pantoprazole    Tablet 40 milliGRAM(s) Oral before breakfast  rivaroxaban 20 milliGRAM(s) Oral every 24 hours    SOCIAL HISTORY:  Smoker:      FAMILY HISTORY:  No pertinent family history in first degree relatives    VITALS:  Vital Signs Last 24 Hrs  T(C): 36.4 (2019 07:59), Max: 37.1 (2019 23:53)  T(F): 97.6 (2019 07:59), Max: 98.7 (2019 23:53)  HR: 86 (2019 07:59) (83 - 105)  BP: 119/75 (2019 07:59) (101/66 - 131/81)  BP(mean): --  RR: 18 (2019 07:59) (16 - 24)  SpO2: 98% (2019 07:59) (97% - 100%)      PHYSICAL EXAM:  Constitutional:  HEENT:  Neck:  Respiratory:  Cardiovascular:  Gastrointestinal:  Extremities:  Skin:  Ortho:  Neuro:      LABS/DIAGNOSTIC TESTS:                        9.2    7.18  )-----------( 321      ( 2019 07:08 )             30.0     WBC Count: 7.18 K/uL ( @ 07:08)  WBC Count: 8.58 K/uL ( @ 20:45)        145  |  112<H>  |  22<H>  ----------------------------<  72  4.1   |  24  |  0.67    Ca    7.2<L>      2019 07:08  Phos  2.6       Mg     1.6         TPro  5.2<L>  /  Alb  1.4<L>  /  TBili  0.4  /  DBili  x   /  AST  26  /  ALT  38  /  AlkPhos  131<H>  20    Urinalysis Basic - ( 2019 00:53 )  Color: Yellow / Appearance: Clear / S.005 / pH: x  Gluc: x / Ketone: Negative  / Bili: Negative / Urobili: Negative   Blood: x / Protein: Negative / Nitrite: Negative   Leuk Esterase: Negative / RBC: x / WBC x   Sq Epi: x / Non Sq Epi: x / Bacteria: x    LIVER FUNCTIONS - ( 2019 07:08 )  Alb: 1.4 g/dL / Pro: 5.2 g/dL / ALK PHOS: 131 U/L / ALT: 38 U/L DA / AST: 26 U/L / GGT: x           PT/INR - ( 2019 13:04 )   PT: 19.6 sec;   INR: 1.74 ratio    PTT - ( 2019 13:04 )  PTT:21.9 sec    FLU A B RSV Detection by PCR (19 @ 00:51)    Flu A Result: NotDetec:     Flu B Result: NotDetec    RSV Result: NotDetec        CULTURES:   UC - pending       RADIOLOGY:  EXAM:  US LIVER AND PANCREAS                        EXAM:  US ABDOMEN LIMITED                        PROCEDURE DATE:  2019    INTERPRETATION:  CLINICAL STATEMENT: Ascites, transaminitis, abdominal   tenderness    TECHNIQUE: Ultrasound of the right upper quadrant.    COMPARISON: CT scan of 2019.    FINDINGS:    The liver is normal in echogenicity.  There is hepatopedal flow in the   main portal vein.    There is are gallstones without gallbladder wall thickening.    The common bile duct is not dilated measuring 3 mm.    The pancreas is not well seen.    The right kidney measures 8.8 cm in length.  There is no hydronephrosis.    The visualized aorta and IVC are unremarkable.  There is a large amount of abdominal and pelvic ascites.    IMPRESSION:    Large amount of ascites  Cholelithiasis that evidence of cholecystitis HPI:  ID consult was called to evaluate this 62 y/o female for possible left sided pneumonia. Patient presented to ED with c/o generalized body aches and SOB. CXR shows ?left sided pneumonia. Patient admits cough has improved but reports sputum production has resolved. BLE venous doppler is showing bilateral DVTs of legs.     As per H&P:  63 Female from home with PMH of Recurrent SBO's, s/p exp lap, SB resection,  DVT ( Left UE , then Left LE) , PE , chronic leg swelling , comes in with complaint of generalized body swelling and sob. Patient reports having bilateral le swelling for a long time now ( year or so) , however has been having fluid retention in abdomen and b/l upper extremities ever since she was discharged from [facility] in January. Patient reports sob on exertion, even walking from room to bathroom ( [facility] 3), no orthopnea , no PND. Denies any chest pain , palpitations, nausea , vomiting , diarrhea , abdominal pain. Reports having cough with whitish sputum yesterday in addition to some wheezing. No h/o Lung dz, liver dz, no malignancy, no recent weight loss or loss of apetite. Reports having limited mobilization. No h/o hepatitis , no family history of cancer. No sick contacts / no Tb exposure , travelled to [place] in December.   Reports having minor streaks of rectal bleeding on and off from hemorrhoids. Last Mammogram and PAP 2 years ago : wNL; Last Colonoscopy in 2018 : no cancer/ polyp In Ed , BP: 119/77 mm hg , Hr: 105 , Temp : 98.1 F  Cbc   Cmp shows elevated bun , albumin of 0.9   T1 negative   CXR shows Left sided pleural effusion with likely left cardiac opacity. (2019 16:26)    REVIEW OF SYSTEMS:  [  ] Not able to illicit  General: no fevers no malaise  Chest: +improved cough no sob no sputum production   GI: no nvd  : no urinary sxs   Skin: no rashes  Musculoskeletal: no trauma no LBP  Neuro: no ha's no dizziness     PAST MEDICAL & SURGICAL HISTORY:  Pulmonary embolism  DVT (deep venous thrombosis)  SBO (small bowel obstruction)  History of intestinal surgery    ALLERGIES: No Known Allergies    MEDS:  azithromycin  IVPB 500 milliGRAM(s) IV Intermittent every 24 hours  cefTRIAXone   IVPB 1 Gram(s) IV Intermittent every 24 hours      folic acid 1 milliGRAM(s) Oral daily  furosemide   Injectable 40 milliGRAM(s) IV Push every 12 hours  pantoprazole    Tablet 40 milliGRAM(s) Oral before breakfast  rivaroxaban 20 milliGRAM(s) Oral every 24 hours    SOCIAL HISTORY:  Smoker:  none     FAMILY HISTORY:  No pertinent family history in first degree relatives    VITALS:  Vital Signs Last 24 Hrs  T(C): 36.4 (2019 07:59), Max: 37.1 (2019 23:53)  T(F): 97.6 (2019 07:59), Max: 98.7 (2019 23:53)  HR: 86 (2019 07:59) (83 - 105)  BP: 119/75 (2019 07:59) (101/66 - 131/81)  BP(mean): --  RR: 18 (2019 07:59) (16 - 24)  SpO2: 98% (2019 07:59) (97% - 100%)      PHYSICAL EXAM:  Constitutional: older female in NAD  HEENT: normocephalic with moist oral mucosa  Neck: supple no LN's no JVD, left neck EJ line   Respiratory: lungs mostly clear with mild rhonchi on left side no rales no rhonchi  Cardiovascular: S1 S2 reg no murmurs  Gastrointestinal: +BS with soft, nondistended abdomen; nontender  Extremities: bilateral arm/ leg 1+ pitting edema no cyanosis  Skin: no rashes  Ortho: no jt swelling  Neuro: AAO x 3      LABS/DIAGNOSTIC TESTS:                        9.2    7.18  )-----------( 321      ( 2019 07:08 )             30.0     WBC Count: 7.18 K/uL ( @ 07:08)  WBC Count: 8.58 K/uL ( @ 20:45)        145  |  112<H>  |  22<H>  ----------------------------<  72  4.1   |  24  |  0.67    Ca    7.2<L>      2019 07:08  Phos  2.6       Mg     1.6         TPro  5.2<L>  /  Alb  1.4<L>  /  TBili  0.4  /  DBili  x   /  AST  26  /  ALT  38  /  AlkPhos  131<H>      Urinalysis Basic - ( 2019 00:53 )  Color: Yellow / Appearance: Clear / S.005 / pH: x  Gluc: x / Ketone: Negative  / Bili: Negative / Urobili: Negative   Blood: x / Protein: Negative / Nitrite: Negative   Leuk Esterase: Negative / RBC: x / WBC x   Sq Epi: x / Non Sq Epi: x / Bacteria: x    LIVER FUNCTIONS - ( 2019 07:08 )  Alb: 1.4 g/dL / Pro: 5.2 g/dL / ALK PHOS: 131 U/L / ALT: 38 U/L DA / AST: 26 U/L / GGT: x           PT/INR - ( 2019 13:04 )   PT: 19.6 sec;   INR: 1.74 ratio    PTT - ( 2019 13:04 )  PTT:21.9 sec    FLU A B RSV Detection by PCR (19 @ 00:51)    Flu A Result: NotDetec:     Flu B Result: NotDetec    RSV Result: NotDetec        CULTURES:   UC - pending       RADIOLOGY:  EXAM:  US LIVER AND PANCREAS                        EXAM:  US ABDOMEN LIMITED                        PROCEDURE DATE:  2019    INTERPRETATION:  CLINICAL STATEMENT: Ascites, transaminitis, abdominal   tenderness    TECHNIQUE: Ultrasound of the right upper quadrant.    COMPARISON: CT scan of 2019.    FINDINGS:    The liver is normal in echogenicity.  There is hepatopedal flow in the   main portal vein.    There is are gallstones without gallbladder wall thickening.    The common bile duct is not dilated measuring 3 mm.    The pancreas is not well seen.    The right kidney measures 8.8 cm in length.  There is no hydronephrosis.    The visualized aorta and IVC are unremarkable.  There is a large amount of abdominal and pelvic ascites.    IMPRESSION:    Large amount of ascites  Cholelithiasis that evidence of cholecystitis          EXAM:  US DPLX LWR EXT VEINS COMPL BI                        PROCEDURE DATE:  2019    INTERPRETATION:  CLINICAL HISTORY: 63 years  Female with dvt. History of   pulmonary embolism. Bilateral DVT documented on 2019    COMPARISON: 2019.    TECHNIQUE: Multiple static images from duplex sonography of the deep   veins of the right and left lower extremity utilizing color and spectral   Doppler interrogation, without and with compression.    FINDINGS:  On the RIGHT , the common femoral vein and superficial femoral vein no   longer contain thrombus however the walls are thickened. The veins are   compressible. There is residual intraluminal thrombus in the popliteal   vein and peroneal veins with incomplete compressibility and partial   blood flow.     On the LEFT , intraluminal thrombus is present from the common femoral   vein to the popliteal vein and extends into the posterior tibial vein and   peroneal vein. There is incomplete compressibility. Some peripheral flow   is identified.    IMPRESSION:    DVT in the RIGHT popliteal vein and peroneal vein.    DVT from the LEFT common femoral vein to the calf.

## 2019-02-20 NOTE — CONSULT NOTE ADULT - ASSESSMENT
?Pneumonia Possible pneumonia   ·	cont rocephin 1gm IV daily  D2  ·	cont azithromycin 500mg IV daily  D2  ·	awaiting  results

## 2019-02-20 NOTE — CONSULT NOTE ADULT - ASSESSMENT
63 Female from home with PMH of Recurrent SBO's, s/p exp lap, SB resection,  DVT ( Left UE , then Left LE) , PE , chronic leg swelling , comes in with complaint of generalized body swelling and sob.   1.Heme/Onc eval.  2.Anasarca-diuretics.  3.Ascites-paracenthesis,  4.DVT and PE on xarelto.  5.Check anemia profile,occult.  6.PPI.  7.Echocardiogram.

## 2019-02-20 NOTE — CONSULT NOTE ADULT - SUBJECTIVE AND OBJECTIVE BOX
CHIEF COMPLAINT:Patient is a 63y old  Female who presents with a chief complaint of sob , generalized body swelling .      HPI:  63 Female from home with PMH of Recurrent SBO's, s/p exp lap, SB resection,  DVT ( Left UE , then Left LE) , PE , chronic leg swelling , comes in with complaint of generalized body swelling and sob. Patient reports having bilateral le swelling for a long time now ( year or so) , however has been having fluid retention in abdomen and b/l upper extremities ever since she was discharged from [facility] in January. Patient reports sob on exertion, even walking from room to bathroom ( [facility] 3), no orthopnea , no PND. Denies any chest pain , palpitations, nausea , vomiting , diarrhea , abdominal pain. Reports having cough with whitish sputum yesterday in addition to some wheezing. No h/o Lung dz, liver dz, no malignancy, no recent weight loss or loss of apetite.   Reports having limited mobilization. No h/o hepatitis , no family history of cancer. No sick contacts / no Tb exposure , travelled to [place] in December.   Reports having minor streaks of rectal bleeding on and off from hemorrhoids.   Last Mammogram and PAP 2 years ago : wNL; Last Colonoscopy in April 2018 : no cancer/ polyp    In Ed , BP: 119/77 mm hg , Hr: 105 , Temp : 98.1 F  Cbc   Cmp shows elevated bun , albumin of 0.9   T1 negative   CXR shows Left sided pleural effusion with likely left cardiac opacity. (19 Feb 2019 16:26)      PAST MEDICAL & SURGICAL HISTORY:  Pulmonary embolism  DVT (deep venous thrombosis)  SBO (small bowel obstruction)  History of intestinal surgery      MEDICATIONS  (STANDING):  azithromycin  IVPB      azithromycin  IVPB 500 milliGRAM(s) IV Intermittent every 24 hours  cefTRIAXone   IVPB 1 Gram(s) IV Intermittent every 24 hours  cefTRIAXone   IVPB      folic acid 1 milliGRAM(s) Oral daily  furosemide   Injectable 40 milliGRAM(s) IV Push every 12 hours  pantoprazole    Tablet 40 milliGRAM(s) Oral before breakfast  rivaroxaban 20 milliGRAM(s) Oral every 24 hours    MEDICATIONS  (PRN):      FAMILY HISTORY: No hx of CAD      SOCIAL HISTORY:    [ x] Non-smoker    [x ] Alcohol-denies    Allergies    No Known Allergies    Intolerances    	    REVIEW OF SYSTEMS:  CONSTITUTIONAL: No fever, weight loss, or fatigue  EYES: No eye pain, visual disturbances, or discharge  ENT:  No difficulty hearing, tinnitus, vertigo; No sinus or throat pain  NECK: No pain or stiffness  RESPIRATORY: No cough, wheezing, chills or hemoptysis; + Shortness of Breath  CARDIOVASCULAR: No chest pain, palpitations, passing out, dizziness, + leg swelling  GASTROINTESTINAL: No abdominal or epigastric pain. No nausea, vomiting, or hematemesis; No diarrhea or constipation. No melena or hematochezia.  GENITOURINARY: No dysuria, frequency, hematuria, or incontinence  NEUROLOGICAL: No headaches, memory loss, loss of strength, numbness, or tremors  SKIN: No itching, burning, rashes, or lesions   LYMPH Nodes: No enlarged glands  ENDOCRINE: No heat or cold intolerance; No hair loss  MUSCULOSKELETAL: No joint pain or swelling; No muscle, back, or extremity pain  PSYCHIATRIC: No depression, anxiety, mood swings, or difficulty sleeping  HEME/LYMPH: No easy bruising, or bleeding gums  ALLERGY AND IMMUNOLOGIC: No hives or eczema	      PHYSICAL EXAM:  T(C): 36.3 (02-20-19 @ 11:56), Max: 37.1 (02-19-19 @ 23:53)  HR: 87 (02-20-19 @ 11:56) (83 - 92)  BP: 103/62 (02-20-19 @ 11:56) (101/66 - 131/81)  RR: 16 (02-20-19 @ 11:56) (16 - 18)  SpO2: 100% (02-20-19 @ 11:56) (97% - 100%)  Wt(kg): --  I&O's Summary    19 Feb 2019 07:01  -  20 Feb 2019 07:00  --------------------------------------------------------  IN: 0 mL / OUT: 3900 mL / NET: -3900 mL    20 Feb 2019 07:01  -  20 Feb 2019 13:45  --------------------------------------------------------  IN: 0 mL / OUT: 1700 mL / NET: -1700 mL        Appearance: Normal	  HEENT:   Normal oral mucosa, PERRL, EOMI	  Lymphatic: No lymphadenopathy  Cardiovascular: Normal S1 S2, No JVD, No murmurs, +1 edema  Respiratory: Lungs clear to auscultation	  Psychiatry: A & O x 3, Mood & affect appropriate  Gastrointestinal:  Soft, Non-tender, + BS	  Skin: No rashes, No ecchymoses, No cyanosis	  Neurologic: Non-focal  Extremities: Normal range of motion, No clubbing, cyanosis +1 edema  Vascular: Peripheral pulses palpable 2+ bilaterally    	    ECG:  	Normal sinus rhythm  Low voltage QRS  Cannot rule out Anterior infarct , age undetermined      	  LABS:	 	    CARDIAC MARKERS:  CARDIAC MARKERS ( 19 Feb 2019 13:04 )  <0.015 ng/mL / x     / x     / x     / x                                  9.2    7.18  )-----------( 321      ( 20 Feb 2019 07:08 )             30.0     02-20    145  |  112<H>  |  22<H>  ----------------------------<  72  4.1   |  24  |  0.67    Ca    7.2<L>      20 Feb 2019 07:08  Phos  2.6     02-20  Mg     1.6     02-20    TPro  5.2<L>  /  Alb  1.4<L>  /  TBili  0.4  /  DBili  x   /  AST  26  /  ALT  38  /  AlkPhos  131<H>  02-20      Lipid Profile: Cholesterol 53  LDL 15  HDL 29  TG 43      TSH: Thyroid Stimulating Hormone, Serum: 1.97 uU/mL (02-20 @ 07:08)        EXAM:  CT ANGIO CHEST (W)AW IC                            PROCEDURE DATE:  01/24/2019          INTERPRETATION:  Chest CTA, CT of the abdomen and pelvis with IV contrast     Technique: Axial multidetector CT images of the chest, abdomen, and   pelvis are acquired following intravenous administration 90 cc   Omnipaque-350 (10 cc discarded). Chest CT is performed according to our   pulmonary embolism protocol.    Comparison: Abdominal CT dated 2/8/2018    Findings:    Chest: Mild ectasia of the ascending aorta at 3.7 cm in diameter. No   evidence for aortic dissection. Aberrant right subclavian artery. Filling   defects are seen in the right upper lobe and right lower lobe segmental   pulmonary arteries, compatible with pulmonary embolism.    Mild bilateral pleural effusions. No pericardial effusion. Mild   cardiomegaly. Apparent distal esophageal mural thickening. No enlarged   mediastinal, hilar, or axillary lymph node.    The trachea and central bronchi appear grossly unremarkable.    Mild bilateral atelectasis. No evidence for pulmonary consolidation.    Abdomen/pelvis: Stable cholelithiasis. Mucosal hyperenhancement of the   gallbladder may be due to contraction or acute cholecystitis. Clinical   correlation is recommended.     Hepatic steatosis.    The common bile duct is not dilated.     Atrophic pancreas.    The spleen, and adrenals appear unremarkable. Tiny hypodense lesions in   the kidneys bilaterally, too small to characterize.    New large ascites in the abdomen and pelvis. Apparent small bowel   anastomosis in the right lower quadrant abdomen. There is small bowel   dilatation with apparent 2 converging transition points in the right   lower quadrant mesentery with associated twisting appearance. Findings   are suggestive of a closed loop small bowel obstruction. Given the large   ascites, associated bowel ischemia cannot be excluded. Segment mural   thickening of the ascending colon  may represent nonspecific colitis or   ischemic colitis. Apparent mural thickening of the stomach may be due to   underdistention; nonspecific gastritis or gastric cancer cannot be   excluded.     No evidence for free air or enlarged lymph node.    Apparent filling defect in the left common femoral and left superficial   femoral veins, compatible with deep vein thrombosis.    The urinary bladder and uterus appear unremarkable.    Age indeterminate compression fracture at T5 vertebra.    Impression: Positive pulmonary embolism. Apparent filling defect in the   left common femoral and left superficial femoral veins, compatible with   deep vein thrombosis.    Mild bilateral pleural effusions.    Apparent distal esophageal mural thickening. Apparent mural thickening of   the stomach may be due to underdistention; nonspecific gastritis or   gastric cancer cannot be excluded.  EGD may be pursued for further   evaluation.     Stable cholelithiasis. Mucosal hyperenhancement of the gallbladder may be   due to contraction or acute cholecystitis. Clinical correlation is   recommended.    Apparent small bowel anastomosis in the right lower quadrant abdomen.   There is small bowel dilatation with apparent 2 converging transition   points in the right lower quadrant mesentery with associated twisting   appearance. Findings are suggestive of a closed loop small bowel   obstruction. Given the large ascites, associated bowel ischemia cannot be   excluded. Segment mural thickening of the ascending colon  may represent   nonspecific colitis or ischemic colitis.     Age indeterminate compression fracture at T5 vertebra.    PA  is informed.    A preliminary report was provided by [facility].

## 2019-02-21 ENCOUNTER — RESULT REVIEW (OUTPATIENT)
Age: 64
End: 2019-02-21

## 2019-02-21 DIAGNOSIS — Z29.9 ENCOUNTER FOR PROPHYLACTIC MEASURES, UNSPECIFIED: ICD-10-CM

## 2019-02-21 DIAGNOSIS — K63.9 DISEASE OF INTESTINE, UNSPECIFIED: ICD-10-CM

## 2019-02-21 DIAGNOSIS — I82.409 ACUTE EMBOLISM AND THROMBOSIS OF UNSPECIFIED DEEP VEINS OF UNSPECIFIED LOWER EXTREMITY: ICD-10-CM

## 2019-02-21 LAB
ANION GAP SERPL CALC-SCNC: 6 MMOL/L — SIGNIFICANT CHANGE UP (ref 5–17)
B PERT IGG+IGM PNL SER: CLEAR — SIGNIFICANT CHANGE UP
BUN SERPL-MCNC: 20 MG/DL — HIGH (ref 7–18)
CALCIUM SERPL-MCNC: 7.4 MG/DL — LOW (ref 8.4–10.5)
CEA SERPL-MCNC: 7.1 NG/ML — HIGH (ref 0–3.8)
CHLORIDE SERPL-SCNC: 108 MMOL/L — SIGNIFICANT CHANGE UP (ref 96–108)
CO2 SERPL-SCNC: 27 MMOL/L — SIGNIFICANT CHANGE UP (ref 22–31)
COLOR FLD: SIGNIFICANT CHANGE UP
CREAT SERPL-MCNC: 0.65 MG/DL — SIGNIFICANT CHANGE UP (ref 0.5–1.3)
CULTURE RESULTS: NO GROWTH — SIGNIFICANT CHANGE UP
FLUID INTAKE SUBSTANCE CLASS: SIGNIFICANT CHANGE UP
FLUID SEGMENTED GRANULOCYTES: 38 % — SIGNIFICANT CHANGE UP
GLUCOSE BLDC GLUCOMTR-MCNC: 83 MG/DL — SIGNIFICANT CHANGE UP (ref 70–99)
GLUCOSE SERPL-MCNC: 77 MG/DL — SIGNIFICANT CHANGE UP (ref 70–99)
GRAM STN FLD: SIGNIFICANT CHANGE UP
HBA1C BLD-MCNC: <4 % — LOW (ref 4–5.6)
HCT VFR BLD CALC: 30.5 % — LOW (ref 34.5–45)
HGB BLD-MCNC: 9.2 G/DL — LOW (ref 11.5–15.5)
IRON SATN MFR SERPL: 38 % — SIGNIFICANT CHANGE UP (ref 15–50)
IRON SATN MFR SERPL: 40 UG/DL — SIGNIFICANT CHANGE UP (ref 40–150)
LACTATE SERPL-SCNC: 2.5 MMOL/L — HIGH (ref 0.7–2)
LDH SERPL L TO P-CCNC: 337 U/L — HIGH (ref 120–225)
LYMPHOCYTES # FLD: 30 % — SIGNIFICANT CHANGE UP
MCHC RBC-ENTMCNC: 28.5 PG — SIGNIFICANT CHANGE UP (ref 27–34)
MCHC RBC-ENTMCNC: 30.2 GM/DL — LOW (ref 32–36)
MCV RBC AUTO: 94.4 FL — SIGNIFICANT CHANGE UP (ref 80–100)
MONOS+MACROS # FLD: 32 % — SIGNIFICANT CHANGE UP
NRBC # BLD: 0 /100 WBCS — SIGNIFICANT CHANGE UP (ref 0–0)
PLATELET # BLD AUTO: 280 K/UL — SIGNIFICANT CHANGE UP (ref 150–400)
POTASSIUM SERPL-MCNC: 3.7 MMOL/L — SIGNIFICANT CHANGE UP (ref 3.5–5.3)
POTASSIUM SERPL-SCNC: 3.7 MMOL/L — SIGNIFICANT CHANGE UP (ref 3.5–5.3)
RBC # BLD: 3.23 M/UL — LOW (ref 3.8–5.2)
RBC # FLD: 25.2 % — HIGH (ref 10.3–14.5)
RCV VOL RI: 44 /UL — HIGH (ref 0–5)
SODIUM SERPL-SCNC: 141 MMOL/L — SIGNIFICANT CHANGE UP (ref 135–145)
SPECIMEN SOURCE: SIGNIFICANT CHANGE UP
SPECIMEN SOURCE: SIGNIFICANT CHANGE UP
TIBC SERPL-MCNC: 105 UG/DL — LOW (ref 250–450)
TOTAL NUCLEATED CELL COUNT, BODY FLUID: 42 /UL — HIGH (ref 0–5)
TUBE TYPE: SIGNIFICANT CHANGE UP
UIBC SERPL-MCNC: 65 UG/DL — LOW (ref 110–370)
WBC # BLD: 6.05 K/UL — SIGNIFICANT CHANGE UP (ref 3.8–10.5)
WBC # FLD AUTO: 6.05 K/UL — SIGNIFICANT CHANGE UP (ref 3.8–10.5)

## 2019-02-21 PROCEDURE — 93306 TTE W/DOPPLER COMPLETE: CPT | Mod: 26

## 2019-02-21 PROCEDURE — 49083 ABD PARACENTESIS W/IMAGING: CPT

## 2019-02-21 PROCEDURE — 74177 CT ABD & PELVIS W/CONTRAST: CPT | Mod: 26

## 2019-02-21 PROCEDURE — 71260 CT THORAX DX C+: CPT | Mod: 26

## 2019-02-21 PROCEDURE — 88305 TISSUE EXAM BY PATHOLOGIST: CPT | Mod: 26

## 2019-02-21 PROCEDURE — 88342 IMHCHEM/IMCYTCHM 1ST ANTB: CPT | Mod: 26

## 2019-02-21 PROCEDURE — 88112 CYTOPATH CELL ENHANCE TECH: CPT | Mod: 26

## 2019-02-21 PROCEDURE — 99222 1ST HOSP IP/OBS MODERATE 55: CPT | Mod: GC

## 2019-02-21 PROCEDURE — 88341 IMHCHEM/IMCYTCHM EA ADD ANTB: CPT | Mod: 26

## 2019-02-21 RX ORDER — GLUCAGON INJECTION, SOLUTION 0.5 MG/.1ML
1 INJECTION, SOLUTION SUBCUTANEOUS ONCE
Qty: 0 | Refills: 0 | Status: DISCONTINUED | OUTPATIENT
Start: 2019-02-21 | End: 2019-02-25

## 2019-02-21 RX ORDER — ACETAMINOPHEN 500 MG
650 TABLET ORAL EVERY 6 HOURS
Qty: 0 | Refills: 0 | Status: DISCONTINUED | OUTPATIENT
Start: 2019-02-21 | End: 2019-02-27

## 2019-02-21 RX ORDER — SODIUM CHLORIDE 9 MG/ML
1000 INJECTION, SOLUTION INTRAVENOUS
Qty: 0 | Refills: 0 | Status: DISCONTINUED | OUTPATIENT
Start: 2019-02-21 | End: 2019-02-25

## 2019-02-21 RX ORDER — DEXTROSE 50 % IN WATER 50 %
25 SYRINGE (ML) INTRAVENOUS ONCE
Qty: 0 | Refills: 0 | Status: DISCONTINUED | OUTPATIENT
Start: 2019-02-21 | End: 2019-02-24

## 2019-02-21 RX ORDER — DEXTROSE 50 % IN WATER 50 %
15 SYRINGE (ML) INTRAVENOUS ONCE
Qty: 0 | Refills: 0 | Status: DISCONTINUED | OUTPATIENT
Start: 2019-02-21 | End: 2019-02-25

## 2019-02-21 RX ORDER — INSULIN LISPRO 100/ML
VIAL (ML) SUBCUTANEOUS EVERY 6 HOURS
Qty: 0 | Refills: 0 | Status: DISCONTINUED | OUTPATIENT
Start: 2019-02-21 | End: 2019-02-24

## 2019-02-21 RX ORDER — IOHEXOL 300 MG/ML
30 INJECTION, SOLUTION INTRAVENOUS ONCE
Qty: 0 | Refills: 0 | Status: COMPLETED | OUTPATIENT
Start: 2019-02-21 | End: 2019-02-21

## 2019-02-21 RX ORDER — DEXTROSE 50 % IN WATER 50 %
12.5 SYRINGE (ML) INTRAVENOUS ONCE
Qty: 0 | Refills: 0 | Status: DISCONTINUED | OUTPATIENT
Start: 2019-02-21 | End: 2019-02-25

## 2019-02-21 RX ADMIN — IOHEXOL 30 MILLILITER(S): 300 INJECTION, SOLUTION INTRAVENOUS at 15:00

## 2019-02-21 RX ADMIN — Medication 40 MILLIGRAM(S): at 18:13

## 2019-02-21 RX ADMIN — CEFTRIAXONE 100 GRAM(S): 500 INJECTION, POWDER, FOR SOLUTION INTRAMUSCULAR; INTRAVENOUS at 18:13

## 2019-02-21 RX ADMIN — Medication 650 MILLIGRAM(S): at 00:59

## 2019-02-21 RX ADMIN — AZITHROMYCIN 250 MILLIGRAM(S): 500 TABLET, FILM COATED ORAL at 18:14

## 2019-02-21 RX ADMIN — Medication 650 MILLIGRAM(S): at 00:29

## 2019-02-21 RX ADMIN — Medication 40 MILLIGRAM(S): at 05:10

## 2019-02-21 RX ADMIN — RIVAROXABAN 20 MILLIGRAM(S): KIT at 18:13

## 2019-02-21 RX ADMIN — PANTOPRAZOLE SODIUM 40 MILLIGRAM(S): 20 TABLET, DELAYED RELEASE ORAL at 05:10

## 2019-02-21 RX ADMIN — SPIRONOLACTONE 25 MILLIGRAM(S): 25 TABLET, FILM COATED ORAL at 05:10

## 2019-02-21 NOTE — CONSULT NOTE ADULT - SUBJECTIVE AND OBJECTIVE BOX
PULMONARY/CRITICAL CARE CONSULTATION  MARIBETH PADILLA 63y FemalePatient is a 63y old  Female who presents with a chief complaint of sob , generalized body swelling (2019 13:30)    HPI:  63 Female from home with PMH of Recurrent SBO's, s/p exp lap, SB resection,  DVT ( Left UE , then Left LE) , PE , chronic leg swelling , comes in with complaint of generalized body swelling and sob.   Admitted for anasarca.    PAST MEDICAL & SURGICAL HISTORY:  Pulmonary embolism  DVT (deep venous thrombosis)  SBO (small bowel obstruction)  History of intestinal surgery    Allergies    No Known Allergies    Intolerances      SOCIAL HISTORY/FAMILY HISTORY reviewed:   Medications:  acetaminophen   Tablet .. 650 milliGRAM(s) Oral every 6 hours PRN  azithromycin  IVPB      azithromycin  IVPB 500 milliGRAM(s) IV Intermittent every 24 hours  cefTRIAXone   IVPB 1 Gram(s) IV Intermittent every 24 hours  cefTRIAXone   IVPB      folic acid 1 milliGRAM(s) Oral daily  furosemide   Injectable 40 milliGRAM(s) IV Push every 12 hours  pantoprazole    Tablet 40 milliGRAM(s) Oral before breakfast  rivaroxaban 20 milliGRAM(s) Oral every 24 hours  spironolactone 25 milliGRAM(s) Oral daily    Review of Systems:  12 point ROS performed, pertinent positives and negatives below  CONSTITUTIONAL: [ x]No fever, chills, or fatigue;  EYES: [ x]No eye pain, visual disturbances, or discharge;  ENMT:  [x ]No difficulty hearing, tinnitus, vertigo; No sinus or throat pain;  NECK: [x ]No pain or stiffness;  RESPIRATORY: [x ]No cough, wheezing, chills or hemoptysis; No shortness of breath;  CARDIOVASCULAR: [ x]No chest pain, palpitations, dizziness, or leg swelling;  GASTROINTESTINAL: [x ]No abdominal or epigastric pain. No nausea, vomiting, or hematemesis; No diarrhea or constipation. No melena or hematochezia; +abdominal swelling  GENITOURINARY: [x ]No dysuria, frequency, hematuria, or incontinence;  NEUROLOGICAL: [ x]No headaches, memory loss, loss of strength, numbness, or tremors;  SKIN: [x ]No itching, burning, rashes, or lesions; leg swelling  MUSCULOSKELETAL: [x ]No joint pain ; No muscle, back, or extremity pain;  PSYCHIATRIC: [x ]No depression, anxiety, mood swings, or difficulty sleeping;    T(F): 97.6 (19 @ 14:06), Max: 97.7 (19 @ 21:58)  HR: 88 (19 @ 18:20)  BP: 114/80 (19 @ 18:20)  BP(mean): --  ABP: --  RR: 18 (19 @ 14:06)  SpO2: 94% (19 @ 14:06)     @ 07:01  -   @ 07:00  --------------------------------------------------------  IN: 0 mL / OUT: 5300 mL / NET: -5300 mL      LABS:                        9.2    6.05  )-----------( 280      ( 2019 07:44 )             30.5         141  |  108  |  20<H>  ----------------------------<  77  3.7   |  27  |  0.65    Ca    7.4<L>      2019 07:44  Phos  2.6       Mg     1.6         TPro  5.2<L>  /  Alb  1.4<L>  /  TBili  0.4  /  DBili  x   /  AST  26  /  ALT  38  /  AlkPhos  131<H>  20      Urinalysis Basic - ( 2019 00:53 )    Color: Yellow / Appearance: Clear / S.005 / pH: x  Gluc: x / Ketone: Negative  / Bili: Negative / Urobili: Negative   Blood: x / Protein: Negative / Nitrite: Negative   Leuk Esterase: Negative / RBC: x / WBC x   Sq Epi: x / Non Sq Epi: x / Bacteria: x    CULTURES:  Culture Results:   No growth ( @ 10:25)    .Urine      PHYSICAL EXAM:  GENERAL: [x ]NAD, [x ]well-groomed, [x ]well-developed;  HEAD:  [ x]Atraumatic, [ x]Normocephalic;  EYES: [x ]EOMI, [ x]PERRLA, [x ]conjunctiva and sclera clear;  ENMT: [x ]No tonsillar erythema, exudates, or enlargement; [x ]Moist mucous membranes,   NECK: [ x]Supple, normal appearance, [x ]No JVD; [x ]Normal thyroid; [x ]Trachea midline;  NERVOUS SYSTEM:  [x ]Alert & Oriented X3, [ x]Good concentration; [ x]Motor Strength 5/5 B/L upper and lower extremities; [ x]DTRs 2+ intact and symmetric;  CHEST/LUNG: [ x]No chest deformity; [ x]Normal percussion bilaterally; [x ]No rales, rhonchi, wheezing;   HEART: [x ]Regular rate and rhythm; [x ]No murmurs, rubs, or gallops;  ABDOMEN: [x ]Soft, Nontender, Nondistended; [x ]Bowel sounds present; + surgical scar midline abdomen  EXTREMITIES:  [x ]2+ Peripheral Pulses, [x ]No clubbing, cyanosis, [ x]Bilat lower extremity edema;  LYMPH: [ x]No lymphadenopathy noted;  SKIN: [ x]No rashes or lesions; [x ]Good capillary refill;    RADIOLOGY REVIEWED:    < from: CT Abdomen and Pelvis w/ Oral Cont and w/ IV Cont (19 @ 18:03) >  Impression:    IVC filter with filling defect in the suprarenal portion of the IVC   suggestive of thrombus.  Thrombus left femoral vein as noted on prior CT study.    Colonic distention with redundant sigmoid colon with abrupt transition   distally.  Findings, may reflect distal colonic obstruction; cannot exclude sigmoid   volvulus.  Differential includes a paralytic ileus related to ischemic bowel.    < end of copied text > PULMONARY/CRITICAL CARE CONSULTATION  MARIBETH PADILLA 63y FemalePatient is a 63y old  Female who presents with a chief complaint of sob , generalized body swelling (2019 13:30)    HPI:  63 Female from home with PMH of Recurrent SBO's, s/p exp lap, SB resection,  DVT ( Left UE , then Left LE) , PE , chronic leg swelling , comes in with complaint of generalized body swelling and sob.   Admitted for anasarca. Patient was seen and examined, reports Hx recurrent SBO and abdominal surgery x 5 since  (2 in [place], 3 in the [facility]), and Hx of being on TPN in the past due to hypoalbuminemia from colon resection and malabsorption. Denies nausea, vomiting, abdominal pain, constipation, diarrhea. Having daily BMs. No other complaints. Does not want further surgical intervention if needed, as per patient.    PAST MEDICAL & SURGICAL HISTORY:  Pulmonary embolism  DVT (deep venous thrombosis)  SBO (small bowel obstruction)  History of intestinal surgery    Allergies    No Known Allergies    Intolerances      SOCIAL HISTORY/FAMILY HISTORY reviewed:   Medications:  acetaminophen   Tablet .. 650 milliGRAM(s) Oral every 6 hours PRN  azithromycin  IVPB      azithromycin  IVPB 500 milliGRAM(s) IV Intermittent every 24 hours  cefTRIAXone   IVPB 1 Gram(s) IV Intermittent every 24 hours  cefTRIAXone   IVPB      folic acid 1 milliGRAM(s) Oral daily  furosemide   Injectable 40 milliGRAM(s) IV Push every 12 hours  pantoprazole    Tablet 40 milliGRAM(s) Oral before breakfast  rivaroxaban 20 milliGRAM(s) Oral every 24 hours  spironolactone 25 milliGRAM(s) Oral daily    Review of Systems:  12 point ROS performed, pertinent positives and negatives below  CONSTITUTIONAL: [ x]No fever, chills, or fatigue;  EYES: [ x]No eye pain, visual disturbances, or discharge;  ENMT:  [x ]No difficulty hearing, tinnitus, vertigo; No sinus or throat pain;  NECK: [x ]No pain or stiffness;  RESPIRATORY: [x ]No cough, wheezing, chills or hemoptysis; No shortness of breath;  CARDIOVASCULAR: [ x]No chest pain, palpitations, dizziness, or leg swelling;  GASTROINTESTINAL: [x ]No abdominal or epigastric pain. No nausea, vomiting, or hematemesis; No diarrhea or constipation. No melena or hematochezia; +abdominal swelling  GENITOURINARY: [x ]No dysuria, frequency, hematuria, or incontinence;  NEUROLOGICAL: [ x]No headaches, memory loss, loss of strength, numbness, or tremors;  SKIN: [x ]No itching, burning, rashes, or lesions; leg swelling  MUSCULOSKELETAL: [x ]No joint pain ; No muscle, back, or extremity pain;  PSYCHIATRIC: [x ]No depression, anxiety, mood swings, or difficulty sleeping;    T(F): 97.6 (19 @ 14:06), Max: 97.7 (19 @ 21:58)  HR: 88 (19 @ 18:20)  BP: 114/80 (19 @ 18:20)  BP(mean): --  ABP: --  RR: 18 (19 @ 14:06)  SpO2: 94% (19 @ 14:06)     @ 07:01  -   @ 07:00  --------------------------------------------------------  IN: 0 mL / OUT: 5300 mL / NET: -5300 mL      LABS:                        9.2    6.05  )-----------( 280      ( 2019 07:44 )             30.5     -    141  |  108  |  20<H>  ----------------------------<  77  3.7   |  27  |  0.65    Ca    7.4<L>      2019 07:44  Phos  2.6     -20  Mg     1.6     -20    TPro  5.2<L>  /  Alb  1.4<L>  /  TBili  0.4  /  DBili  x   /  AST  26  /  ALT  38  /  AlkPhos  131<H>  02-20      Urinalysis Basic - ( 2019 00:53 )    Color: Yellow / Appearance: Clear / S.005 / pH: x  Gluc: x / Ketone: Negative  / Bili: Negative / Urobili: Negative   Blood: x / Protein: Negative / Nitrite: Negative   Leuk Esterase: Negative / RBC: x / WBC x   Sq Epi: x / Non Sq Epi: x / Bacteria: x    CULTURES:  Culture Results:   No growth ( @ 10:25)    .Urine      PHYSICAL EXAM:  GENERAL: [x ]NAD, [x ]well-groomed, [x ]well-developed;  HEAD:  [ x]Atraumatic, [ x]Normocephalic;  EYES: [x ]EOMI, [ x]PERRLA, [x ]conjunctiva and sclera clear;  ENMT: [x ]No tonsillar erythema, exudates, or enlargement; [x ]Moist mucous membranes,   NECK: [ x]Supple, normal appearance, [x ]No JVD; [x ]Normal thyroid; [x ]Trachea midline;  NERVOUS SYSTEM:  [x ]Alert & Oriented X3, [ x]Good concentration; [ x]Motor Strength 5/5 B/L upper and lower extremities; [ x]DTRs 2+ intact and symmetric;  CHEST/LUNG: [ x]No chest deformity; [ x]Normal percussion bilaterally; [x ]No rales, rhonchi, wheezing;   HEART: [x ]Regular rate and rhythm; [x ]No murmurs, rubs, or gallops;  ABDOMEN: [x ]Soft, Nontender, Nondistended; [x ]Bowel sounds present; + surgical scar midline abdomen  EXTREMITIES:  [x ]2+ Peripheral Pulses, [x ]No clubbing, cyanosis, [ x]Bilat lower extremity edema;  LYMPH: [ x]No lymphadenopathy noted;  SKIN: [ x]No rashes or lesions; [x ]Good capillary refill;    RADIOLOGY REVIEWED:    < from: CT Abdomen and Pelvis w/ Oral Cont and w/ IV Cont (19 @ 18:03) >  Impression:    IVC filter with filling defect in the suprarenal portion of the IVC   suggestive of thrombus.  Thrombus left femoral vein as noted on prior CT study.    Colonic distention with redundant sigmoid colon with abrupt transition   distally.  Findings, may reflect distal colonic obstruction; cannot exclude sigmoid   volvulus.  Differential includes a paralytic ileus related to ischemic bowel.    < end of copied text >

## 2019-02-21 NOTE — CONSULT NOTE ADULT - ATTENDING COMMENTS
Pt seen and examined. Agree with resident history, physical, assessment and plan with my additions below.    1) Anasarca  2) Hypoalbuminemia  3) Hx of DVT/PE  4) Hx of SBO    Hemodynamically stable  Oxygenating well  Abnormal CT but no clinical signs of obstruction, pt passing flatus and BMs, no Nausea or vomiting  Abdominal exam is benign, no rebound or guarding  Surgery following  Currently no indication for ICU monitoring at this time.

## 2019-02-21 NOTE — CONSULT NOTE ADULT - PROBLEM SELECTOR RECOMMENDATION 7
IMPROVE VTE Individual Risk Assessment          RISK                                                          Points  [  ] Previous VTE                                                3  [  ] Thrombophilia                                             2  [  ] Lower limb paralysis                                   2        (unable to hold up >15 seconds)    [  ] Current Cancer                                             2         (within 6 months)  [x  ] Immobilization > 24 hrs                              1  [  ] ICU/CCU stay > 24 hours                             1  [ x ] Age > 60                                                         1    IMPROVE VTE Score: 2, c/w xarelto for dvt and PE IMPROVE VTE Individual Risk Assessment          RISK                                                          Points  [ x ] Previous VTE                                                3  [  ] Thrombophilia                                             2  [  ] Lower limb paralysis                                   2        (unable to hold up >15 seconds)    [  ] Current Cancer                                             2         (within 6 months)  [x  ] Immobilization > 24 hrs                              1  [  ] ICU/CCU stay > 24 hours                             1  [ x ] Age > 60                                                         1    IMPROVE VTE Score: 5, c/w xarelto for dvt and PE

## 2019-02-21 NOTE — CONSULT NOTE ADULT - PROBLEM SELECTOR RECOMMENDATION 2
Hx anasarca- as per patient x 10 years since first abdominal surgery due to hypoalbuminemia from malabsorption  lasix 40 iv BID as per cardio  ef 60%  f/u cardio and nephro recs

## 2019-02-21 NOTE — PROGRESS NOTE ADULT - SUBJECTIVE AND OBJECTIVE BOX
PGY2 note discussed with Primary Attending     Patient is a 63y old  Female who presents with a chief complaint of sob , generalized body swelling (2019 07:41)    INTERVAL HPI/OVERNIGHT EVENTS: sob and symptoms improved     MEDICATIONS  (STANDING):  azithromycin  IVPB      azithromycin  IVPB 500 milliGRAM(s) IV Intermittent every 24 hours  cefTRIAXone   IVPB 1 Gram(s) IV Intermittent every 24 hours  cefTRIAXone   IVPB      folic acid 1 milliGRAM(s) Oral daily  furosemide   Injectable 40 milliGRAM(s) IV Push every 12 hours  pantoprazole    Tablet 40 milliGRAM(s) Oral before breakfast  rivaroxaban 20 milliGRAM(s) Oral every 24 hours  spironolactone 25 milliGRAM(s) Oral daily    MEDICATIONS  (PRN):  acetaminophen   Tablet .. 650 milliGRAM(s) Oral every 6 hours PRN Mild Pain (1 - 3), Moderate Pain (4 - 6)      __________________________________________________  REVIEW OF SYSTEMS:    CONSTITUTIONAL: No fever,   EYES: no acute visual disturbances  NECK: No pain or stiffness  RESPIRATORY: No cough; No shortness of breath  CARDIOVASCULAR: No chest pain, no palpitations  GASTROINTESTINAL: No pain. No nausea or vomiting; No diarrhea   NEUROLOGICAL: No headache or numbness, no tremors  MUSCULOSKELETAL: No joint pain, no muscle pain  GENITOURINARY: no dysuria, no frequency, no hesitancy  PSYCHIATRY: no depression , no anxiety  ALL OTHER  ROS negative        Vital Signs Last 24 Hrs  T(C): 36.4 (2019 05:09), Max: 36.5 (2019 21:58)  T(F): 97.6 (2019 05:09), Max: 97.7 (2019 21:58)  HR: 83 (2019 10:15) (71 - 97)  BP: 111/72 (2019 10:15) (96/78 - 111/72)  BP(mean): --  RR: 16 (2019 10:15) (16 - 18)  SpO2: 96% (2019 10:15) (96% - 100%)    ________________________________________________  PHYSICAL EXAM:  GENERAL: NAD  HEENT: Normocephalic;  conjunctivae and sclerae clear; moist mucous membranes;   NECK : supple  CHEST/LUNG: Clear to auscultation bilaterally with good air entry   HEART: S1 S2  regular; no murmurs, gallops or rubs  ABDOMEN: Soft, Nontender, moderate distended; Bowel sounds present  EXTREMITIES: no cyanosis; no edema; no calf tenderness ; still has b/l upper estremities edema and Le , significantly improved from admission   SKIN: warm and dry; no rash  NERVOUS SYSTEM:  Awake and alert; Oriented  to place, person and time ; no new deficits    _________________________________________________  LABS:                        9.2    6.05  )-----------( 280      ( 2019 07:44 )             30.5     02-21    141  |  108  |  20<H>  ----------------------------<  77  3.7   |  27  |  0.65    Ca    7.4<L>      2019 07:44  Phos  2.6     02-20  Mg     1.6     02-20    TPro  5.2<L>  /  Alb  1.4<L>  /  TBili  0.4  /  DBili  x   /  AST  26  /  ALT  38  /  AlkPhos  131<H>  02-20    PT/INR - ( 2019 13:04 )   PT: 19.6 sec;   INR: 1.74 ratio         PTT - ( 2019 13:04 )  PTT:21.9 sec  Urinalysis Basic - ( 2019 00:53 )    Color: Yellow / Appearance: Clear / S.005 / pH: x  Gluc: x / Ketone: Negative  / Bili: Negative / Urobili: Negative   Blood: x / Protein: Negative / Nitrite: Negative   Leuk Esterase: Negative / RBC: x / WBC x   Sq Epi: x / Non Sq Epi: x / Bacteria: x          Consultant(s) Notes Reviewed:   YES    Care Discussed with Consultants : YES    Plan of care was discussed with patient and /or primary care giver; all questions and concerns were addressed and care was aligned with patient's wishes.

## 2019-02-21 NOTE — PROGRESS NOTE ADULT - ASSESSMENT
63 Female from home with PMH of Recurrent SBO's, s/p exp lap, SB resection,  DVT ( Left UE , then Left LE) , PE , chronic leg swelling , comes in with complaint of generalized body swelling and sob.   In Ed , BP: 119/77 mm hg , Hr: 105 , Temp : 98.1 F  Cbc   Cmp shows elevated bun , albumin of 0.9   T1 negative   CXR shows Left sided pleural effusion with likely left cardiac opacity.    Will admit to medicine for Anasarca.

## 2019-02-21 NOTE — CONSULT NOTE ADULT - SUBJECTIVE AND OBJECTIVE BOX
HPI:  63 Female from home with PMH of Recurrent SBO's, s/p exp lap, SB resection,  DVT ( Left UE , then Left LE) , PE , chronic leg swelling , comes in with complaint of generalized body swelling and sob. Patient reports having bilateral le swelling for a long time now ( year or so) , however has been having fluid retention in abdomen and b/l upper extremities ever since she was discharged from [facility] in January. Patient reports sob on exertion, even walking from room to bathroom ( [facility] 3), no orthopnea , no PND. Denies any chest pain , palpitations, nausea , vomiting , diarrhea , abdominal pain. Reports having cough with whitish sputum yesterday in addition to some wheezing. No h/o Lung dz, liver dz, no malignancy, no recent weight loss or loss of apetite.   Reports having limited mobilization. No h/o hepatitis , no family history of cancer. No sick contacts / no Tb exposure , travelled to [place] in December.   Reports having minor streaks of rectal bleeding on and off from hemorrhoids.   Last Mammogram and PAP 2 years ago : wNL; Last Colonoscopy in 2018 : no cancer/ polyp  CXR shows Left sided pleural effusion with likely left cardiac opacity. (2019 16:26)    Surgery consult for Colonic distension  Pt reports prior Operations x5 for Bowel obstruction  Pt denies nausea, vomiting, anorexia. Denies abdominal pain, distension.  She is having flatus and bowel movements.  Ho hypoalbuminemia. Has Paracentesis for Ascites today.      PAST MEDICAL & SURGICAL HISTORY:  Pulmonary embolism  DVT (deep venous thrombosis)  SBO (small bowel obstruction)  History of intestinal surgery      MEDICATIONS  (STANDING):  azithromycin  IVPB      azithromycin  IVPB 500 milliGRAM(s) IV Intermittent every 24 hours  cefTRIAXone   IVPB 1 Gram(s) IV Intermittent every 24 hours  cefTRIAXone   IVPB      folic acid 1 milliGRAM(s) Oral daily  furosemide   Injectable 40 milliGRAM(s) IV Push every 12 hours  pantoprazole    Tablet 40 milliGRAM(s) Oral before breakfast  rivaroxaban 20 milliGRAM(s) Oral every 24 hours  spironolactone 25 milliGRAM(s) Oral daily    MEDICATIONS  (PRN):  acetaminophen   Tablet .. 650 milliGRAM(s) Oral every 6 hours PRN Mild Pain (1 - 3), Moderate Pain (4 - 6)      Allergies    No Known Allergies    Intolerances        SOCIAL HISTORY:  No drug use    FAMILY HISTORY:  No pertinent family history in first degree relatives    Father without Cardiac history.    REVIEW OF SYSTEMS:  CONSTITUTIONAL: In no acute distress.        Vital Signs Last 24 Hrs  T(C): 36.4 (2019 14:06), Max: 36.5 (2019 21:58)  T(F): 97.6 (2019 14:06), Max: 97.7 (2019 21:58)  HR: 88 (2019 18:20) (71 - 90)  BP: 114/80 (2019 18:20) (104/72 - 114/80)  BP(mean): --  RR: 18 (2019 14:06) (16 - 18)  SpO2: 94% (2019 14:06) (94% - 99%)    Daily     Daily     PHYSICAL EXAM:  CONSTITIONAL/GENERAL: NAD, well-groomed, well-developed  HEAD:  Atraumatic, Normocephalic  VISUAL/EYES: EOMI, PERRL, conjunctiva and sclera clear  ENMT: Moist mucous membranes, Good dentition, No lesions  NECK: Supple, No JVD  NERVOUS SYSTEM:  Alert & Oriented X3, Good concentration  RESPIRATORY/CHEST: Clear to percussion bilaterally; Normal respiratory effort  CARDIOVASCULAR/HEART: Regular rate and rhythm  GASTROINTESTINAL/ABDOMEN: Soft, Nontender, Nondistended; Bowel sounds present  Protuberant abdomen, lower midline scar, no rebound tenderness, no guarding.  GENITOURINARY: normal external genitalia  BREASTS: no breast lumps  MUSCULOSKELETAL/EXTREMITIES:  No clubbing, cyanosis, or edema  VASCULAR: equal peripheral pulses  LYMPHATIC: No lymphadenopathy noted  SKIN: No rashes or lesions  PSYCHIATRIC: normal affect      LABS:                        9.2    6.05  )-----------( 280      ( 2019 07:44 )             30.5     Neutrophil %:   02-    141  |  108  |  20<H>  ----------------------------<  77  3.7   |  27  |  0.65    Ca    7.4<L>      2019 07:44  Phos  2.6     02-20  Mg     1.6     02-20    TPro  5.2<L>  /  Alb  1.4<L>  /  TBili  0.4  /  DBili  x   /  AST  26  /  ALT  38  /  AlkPhos  131<H>        Urinalysis Basic - ( 2019 00:53 )    Color: Yellow / Appearance: Clear / S.005 / pH: x  Gluc: x / Ketone: Negative  / Bili: Negative / Urobili: Negative   Blood: x / Protein: Negative / Nitrite: Negative   Leuk Esterase: Negative / RBC: x / WBC x   Sq Epi: x / Non Sq Epi: x / Bacteria: x      RADIOLOGY & ADDITIONAL STUDIES:    < from: CT Abdomen and Pelvis w/ Oral Cont and w/ IV Cont (19 @ 18:03) >  Evaluation of the stomach is limited without distention.  There is diffuse gastric fold thickening.  There is a large fecal load throughout the right colon and transverse   colon through splenic flexure.  The colon is distended, including a distended, air-filled redundant   sigmoid colon. There is an abrupt transition in the distal sigmoid with   relative collapse of the rectum containing minimal stool.  There are probable thickened small bowel loops in the left abdomen.  No localized intra-abdominal fluid collection or pneumoperitoneum is   noted.    There is a Cain catheterwith air present within the urinary bladder.  Free fluid is present within the pelvis.    Impression:    IVC filter with filling defect in the suprarenal portion of the IVC   suggestive of thrombus.  Thrombus left femoral vein as noted on prior CT study.    Colonic distention with redundant sigmoid colon with abrupt transition   distally.  Findings, may reflect distal colonic obstruction; cannot exclude sigmoid   volvulus.  Differential includes a paralytic ileus related to ischemic bowel.    < end of copied text >

## 2019-02-21 NOTE — PROGRESS NOTE ADULT - ASSESSMENT
63 Female from home with PMH of Recurrent SBO's, s/p exp lap, SB resection,  DVT ( Left UE , then Left LE) , PE , chronic leg swelling , comes in with complaint of generalized body swelling and sob.   1.Heme/Onc eval.  2.Anasarca-diuretics.  3.Ascites-s/p paracenthesis,IV Albumin,cont aldactone.,  4.DVT and PE on xarelto.  5.PPI.  6.Echocardiogram.

## 2019-02-21 NOTE — CONSULT NOTE ADULT - NEGATIVE ENMT SYMPTOMS
no hearing difficulty/no gum bleeding/no dry mouth/no throat pain/no dysphagia/no ear pain/no nose bleeds

## 2019-02-21 NOTE — PROGRESS NOTE ADULT - SUBJECTIVE AND OBJECTIVE BOX
63y Female is under our care for     REVIEW OF SYSTEMS:  [  ] Not able to illicit  General:	  Chest:	  GI:	  :  Skin:	  Musculoskeletal:	  Neuro:	    MEDS:  azithromycin  IVPB      azithromycin  IVPB 500 milliGRAM(s) IV Intermittent every 24 hours  cefTRIAXone   IVPB 1 Gram(s) IV Intermittent every 24 hours  cefTRIAXone   IVPB        ALLERGIES: Allergies    No Known Allergies    Intolerances        VITALS:  Vital Signs Last 24 Hrs  T(C): 36.4 (21 Feb 2019 14:06), Max: 36.5 (20 Feb 2019 21:58)  T(F): 97.6 (21 Feb 2019 14:06), Max: 97.7 (20 Feb 2019 21:58)  HR: 90 (21 Feb 2019 14:06) (71 - 97)  BP: 104/72 (21 Feb 2019 14:06) (96/78 - 111/72)  BP(mean): --  RR: 18 (21 Feb 2019 14:06) (16 - 18)  SpO2: 94% (21 Feb 2019 14:06) (94% - 99%)      PHYSICAL EXAM:  HEENT:  Neck:  Respiratory:  Cardiovascular:  Gastrointestinal:  Extremities:  Skin:  Ortho:  Neuro:    LABS/DIAGNOSTIC TESTS:                        9.2    6.05  )-----------( 280      ( 21 Feb 2019 07:44 )             30.5     WBC Count: 6.05 K/uL (02-21 @ 07:44)  WBC Count: 7.18 K/uL (02-20 @ 07:08)  WBC Count: 8.58 K/uL (02-19 @ 20:45)    02-21    141  |  108  |  20<H>  ----------------------------<  77  3.7   |  27  |  0.65    Ca    7.4<L>      21 Feb 2019 07:44  Phos  2.6     02-20  Mg     1.6     02-20    TPro  5.2<L>  /  Alb  1.4<L>  /  TBili  0.4  /  DBili  x   /  AST  26  /  ALT  38  /  AlkPhos  131<H>  02-20      CULTURES:   .Urine  02-20 @ 10:25   No growth  --  --        RADIOLOGY:  no new studies 63y Female is under our care for possible pneumonia. Patient underwent diagnostic and therapeutic paracentesis earlier today and 3000ml were drained. Patient c/o expected incisional abdominal pain. Remains afebrile with normal wbc count. Going for CT A/P and chest later today.    REVIEW OF SYSTEMS:  [  ] Not able to illicit  General: no fevers no malaise  Chest: no cough no sob  GI: no nvd +abd pain  : no urinary sxs   Skin: no rashes  Musculoskeletal: no trauma no LBP  Neuro: no ha's no dizziness 	    MEDS:    azithromycin  IVPB 500 milliGRAM(s) IV Intermittent every 24 hours  cefTRIAXone   IVPB 1 Gram(s) IV Intermittent every 24 hours      ALLERGIES: No Known Allergies    VITALS:  Vital Signs Last 24 Hrs  T(C): 36.4 (21 Feb 2019 14:06), Max: 36.5 (20 Feb 2019 21:58)  T(F): 97.6 (21 Feb 2019 14:06), Max: 97.7 (20 Feb 2019 21:58)  HR: 90 (21 Feb 2019 14:06) (71 - 97)  BP: 104/72 (21 Feb 2019 14:06) (96/78 - 111/72)  BP(mean): --  RR: 18 (21 Feb 2019 14:06) (16 - 18)  SpO2: 94% (21 Feb 2019 14:06) (94% - 99%)      PHYSICAL EXAM:  HEENT: n/a  Neck: supple no LN's   Respiratory: left rhoncherous sounds no rales   Cardiovascular: S1 S2 reg no murmurs  Gastrointestinal: +BS with soft, nondistended abdomen; LLQ tenderness by paracentesis site  Extremities: bilateral arm/ leg 1+ pitting edema   Skin: no rashes  Ortho: n/a  Neuro: AAO x 3      LABS/DIAGNOSTIC TESTS:                        9.2    6.05  )-----------( 280      ( 21 Feb 2019 07:44 )             30.5     WBC Count: 6.05 K/uL (02-21 @ 07:44)  WBC Count: 7.18 K/uL (02-20 @ 07:08)  WBC Count: 8.58 K/uL (02-19 @ 20:45)    02-21    141  |  108  |  20<H>  ----------------------------<  77  3.7   |  27  |  0.65    Ca    7.4<L>      21 Feb 2019 07:44  Phos  2.6     02-20  Mg     1.6     02-20    TPro  5.2<L>  /  Alb  1.4<L>  /  TBili  0.4  /  DBili  x   /  AST  26  /  ALT  38  /  AlkPhos  131<H>  02-20    Lactate Dehydrogenase, Serum: 337 U/L (02.21.19 @ 07:44)          CULTURES:   .Urine  02-20 @ 10:25   No growth  --  --        RADIOLOGY:    2/21 CT A/P - ordered  2/21 CT chesr - ordered

## 2019-02-21 NOTE — CONSULT NOTE ADULT - PROBLEM/RECOMMENDATION-1
Called patient to go over biopsy results. Left voicemail with my callback number requesting return call at earliest convenience  
DISPLAY PLAN FREE TEXT
DISPLAY PLAN FREE TEXT

## 2019-02-21 NOTE — PROGRESS NOTE ADULT - PROBLEM SELECTOR PLAN 1
Comes in with generalized anasarca, extremities , abdomen with mild sob , otherwise comfortable appearing on exam   Likely from hypoalbuminemia  Could be from chronic state of immobilization, poor po intake in past due to sbo  No known heart failure, no JVD on exam , pro-bnp not so high (164) ; pending ECHO  No known liver disease, lfts WNL   No known malignancy or risk factors , outpatient screening negative for cancer ; however CEA elevated , will get Pan CT to r/o malignancy   No exposure to TB/ no night sweats / weight loss   On lasix 40 bid at home   c/w lasix 40 iv bid   Cain catheter to monitor uop   I&Os strict    f/u TSH wnl  UA negative for proteinuria    Paracentesis today , will f/u diagnostic and therapeutic para

## 2019-02-21 NOTE — PROGRESS NOTE ADULT - SUBJECTIVE AND OBJECTIVE BOX
Patient was seen and examined  Patient is a 63y old  Female who presents with a chief complaint of sob , generalized body swelling (2019 16:05)      INTERVAL HPI/OVERNIGHT EVENTS:  T(C): 36.4 (19 @ 05:09), Max: 36.5 (19 @ 21:58)  HR: 71 (19 @ 05:09) (71 - 97)  BP: 109/74 (19 @ 05:09) (96/78 - 119/75)  RR: 16 (19 @ 05:09) (16 - 18)  SpO2: 97% (19 @ 05:09) (97% - 100%)  Wt(kg): --  I&O's Summary    2019 07:01  -  2019 07:00  --------------------------------------------------------  IN: 0 mL / OUT: 5300 mL / NET: -5300 mL        LABS:                        9.2    7.18  )-----------( 321      ( 2019 07:08 )             30.0     02-20    145  |  112<H>  |  22<H>  ----------------------------<  72  4.1   |  24  |  0.67    Ca    7.2<L>      2019 07:08  Phos  2.6     02-20  Mg     1.6     20    TPro  5.2<L>  /  Alb  1.4<L>  /  TBili  0.4  /  DBili  x   /  AST  26  /  ALT  38  /  AlkPhos  131<H>  02-20    PT/INR - ( 2019 13:04 )   PT: 19.6 sec;   INR: 1.74 ratio         PTT - ( 2019 13:04 )  PTT:21.9 sec  Urinalysis Basic - ( 2019 00:53 )    Color: Yellow / Appearance: Clear / S.005 / pH: x  Gluc: x / Ketone: Negative  / Bili: Negative / Urobili: Negative   Blood: x / Protein: Negative / Nitrite: Negative   Leuk Esterase: Negative / RBC: x / WBC x   Sq Epi: x / Non Sq Epi: x / Bacteria: x      CAPILLARY BLOOD GLUCOSE        LIPID PANEL  Cholesterol 53  LDL 15  HDL 29  RATIO HDL/Total Cholesterol --  Triglyceride 43        Urinalysis Basic - ( 2019 00:53 )    Color: Yellow / Appearance: Clear / S.005 / pH: x  Gluc: x / Ketone: Negative  / Bili: Negative / Urobili: Negative   Blood: x / Protein: Negative / Nitrite: Negative   Leuk Esterase: Negative / RBC: x / WBC x   Sq Epi: x / Non Sq Epi: x / Bacteria: x        MEDICATIONS  (STANDING):  azithromycin  IVPB      azithromycin  IVPB 500 milliGRAM(s) IV Intermittent every 24 hours  cefTRIAXone   IVPB 1 Gram(s) IV Intermittent every 24 hours  cefTRIAXone   IVPB      folic acid 1 milliGRAM(s) Oral daily  furosemide   Injectable 40 milliGRAM(s) IV Push every 12 hours  pantoprazole    Tablet 40 milliGRAM(s) Oral before breakfast  rivaroxaban 20 milliGRAM(s) Oral every 24 hours  spironolactone 25 milliGRAM(s) Oral daily    MEDICATIONS  (PRN):  acetaminophen   Tablet .. 650 milliGRAM(s) Oral every 6 hours PRN Mild Pain (1 - 3), Moderate Pain (4 - 6)      RADIOLOGY & ADDITIONAL TESTS:    Imaging Personally Reviewed:  [ ] YES  [ ] NO    REVIEW OF SYSTEMS:  CONSTITUTIONAL: No fever, weight loss, or fatigue  EYES: No eye pain, visual disturbances, or discharge  ENMT:  No difficulty hearing, tinnitus, vertigo; No sinus or throat pain  NECK: No pain or stiffness  BREASTS: No pain, masses, or nipple discharge  RESPIRATORY: No cough, wheezing, chills or hemoptysis; No shortness of breath  CARDIOVASCULAR: No chest pain, palpitations, dizziness, or leg swelling  GASTROINTESTINAL: No abdominal or epigastric pain. No nausea, vomiting, or hematemesis; No diarrhea or constipation. No melena or hematochezia.  GENITOURINARY: No dysuria, frequency, hematuria, or incontinence  NEUROLOGICAL: No headaches, memory loss, loss of strength, numbness, or tremors  SKIN: No itching, burning, rashes, or lesions   LYMPH NODES: No enlarged glands  ENDOCRINE: No heat or cold intolerance; No hair loss  MUSCULOSKELETAL: No joint pain or swelling; No muscle, back, or extremity pain  PSYCHIATRIC: No depression, anxiety, mood swings, or difficulty sleeping  HEME/LYMPH: No easy bruising, or bleeding gums  ALLERY AND IMMUNOLOGIC: No hives or eczema      Consultant(s) Notes Reviewed:  [ x ] YES  [ ] NO    PHYSICAL EXAM:  GENERAL: NAD, well-groomed, well-developed  HEAD:  Atraumatic, Normocephalic  EYES: EOMI, PERRLA, conjunctiva and sclera clear  ENMT: No tonsillar erythema, exudates, or enlargement; Moist mucous membranes, Good dentition, No lesions  NECK: Supple, No JVD, Normal thyroid  NERVOUS SYSTEM:  Alert & Oriented X3, Good concentration; Motor Strength 5/5 B/L upper and lower extremities; DTRs 2+ intact and symmetric  CHEST/LUNG: Clear to percussion bilaterally; No rales, rhonchi, wheezing, or rubs  HEART: Regular rate and rhythm; No murmurs, rubs, or gallops  ABDOMEN: Soft, Nontender, Nondistended; Bowel sounds present  EXTREMITIES:  2+ Peripheral Pulses, No clubbing, cyanosis, or edema  LYMPH: No lymphadenopathy noted  SKIN: No rashes or lesions    Care Discussed with Consultants/Other Providers [ x] YES  [ ] NO

## 2019-02-21 NOTE — CONSULT NOTE ADULT - PROBLEM SELECTOR RECOMMENDATION 9
Colonic distention with redundant sigmoid colon with abrupt transition   distally-GI Consult for Colonoscopy.    Sigmoid volvulus-Obtain STAT GI consult to evaluate and treat with Sigmoidoscopic detorsion.    No evidence of paralytic ileus related to ischemic bowel. No nausea, vomiting, anorexia. No abd pain, tenderness. Having flatus and bowel movt.    Bowel rest, IV fluid  Repeat Labs, lactate, CBC  Repeat AXR in am
Hx SBO- recurrent with 5 abdominal surgeries in the past  no clinical signs of SBO  CT Abdomen and Pelvis: IVC filter with filling defect in the suprarenal portion of the IVC suggestive of thrombus. Thrombus left femoral vein as noted on prior CT study. Colonic distention with redundant sigmoid colon with abrupt transition   distally. Findings, may reflect distal colonic obstruction; cannot exclude sigmoid   volvulus. Differential includes a paralytic ileus related to ischemic bowel.  surgery and GI eval noted  NPO  hold iv fluids 2/2 anasarca  am abdominal xray  f/u lactate- ordered stat  hemodynamically stable, no need for ICU at this time

## 2019-02-21 NOTE — CONSULT NOTE ADULT - SUBJECTIVE AND OBJECTIVE BOX
[  ] STAT REQUEST              [ X ] ROUTINE REQUEST    Patient is a 63 year old female dilated bowel GI consulted to evaluate.      HPI:  63 year old female with multiple medical problems including Chronic Ileus, SBO and redundant colon now with abdominal ascites s/p paracentesis today found to have dilated bowel. Patient reports having flatus and bowel movement today. Patient denies abdominal pain, nausea, vomiting, hematemesis, hematochezia, melena, fever, chills, chest pain, SOB, cough, hematuria or dysuria.                PAIN MANAGEMENT:  Pain Scale:                 0/10  Pain Location:      Prior Colonoscopy:  Last year    PAST MEDICAL HISTORY  Pulmonary embolism  Constipation  DVT    SBO    HTN       PAST SURGICAL HISTORY  History of intestinal surgery      Allergies    No Known Allergies          MEDICATIONS  (STANDING):  azithromycin  IVPB      azithromycin  IVPB 500 milliGRAM(s) IV Intermittent every 24 hours  cefTRIAXone   IVPB 1 Gram(s) IV Intermittent every 24 hours  cefTRIAXone   IVPB      folic acid 1 milliGRAM(s) Oral daily  furosemide   Injectable 40 milliGRAM(s) IV Push every 12 hours  pantoprazole    Tablet 40 milliGRAM(s) Oral before breakfast  rivaroxaban 20 milliGRAM(s) Oral every 24 hours  spironolactone 25 milliGRAM(s) Oral daily    MEDICATIONS  (PRN):  acetaminophen   Tablet .. 650 milliGRAM(s) Oral every 6 hours PRN Mild Pain (1 - 3), Moderate Pain (4 - 6)      SOCIAL HISTORY  Advanced Directives:       [ X ] Full Code       [  ] DNR  Marital Status:         [  ] M      [ X ] S      [  ] D       [  ] W  Children:       [ X ] Yes      [  ] No  Occupation:        [  ] Employed       [ X ] Unemployed       [  ] Retired  Diet:       [ X ] Regular       [  ] PEG feeding          [  ] NG tube feeding  Drug Use:           [ X ] Patient denied          [  ] Yes  Alcohol:           [ X ] No             [  ] Yes (socially)         [  ] Yes (chronic)  Tobacco:           [  ] Yes           [ X ] No    FAMILY HISTORY  [ X ] Heart Disease            [  ] Diabetes             [  ] HTN             [  ] Colon Cancer             [  ] Stomach Cancer              [  ] Pancreatic Cancer    VITAL SIGNS   Vital Signs Last 24 Hrs  T(C): 36.4 (21 Feb 2019 14:06), Max: 36.5 (20 Feb 2019 21:58)  T(F): 97.6 (21 Feb 2019 14:06), Max: 97.7 (20 Feb 2019 21:58)  HR: 88 (21 Feb 2019 18:20) (71 - 90)  BP: 114/80 (21 Feb 2019 18:20) (104/72 - 114/80)   RR: 18 (21 Feb 2019 14:06) (16 - 18)  SpO2: 94% (21 Feb 2019 14:06) (94% - 99%)       CBC Full  -  ( 21 Feb 2019 07:44 )  WBC Count : 6.05 K/uL  Hemoglobin : 9.2 g/dL  Hematocrit : 30.5 %  Platelet Count - Automated : 280 K/uL  Mean Cell Volume : 94.4 fl  Mean Cell Hemoglobin : 28.5 pg  Mean Cell Hemoglobin Concentration : 30.2 gm/dL  Auto Neutrophil # : x  Auto Lymphocyte # : x  Auto Monocyte # : x  Auto Eosinophil # : x  Auto Basophil # : x  Auto Neutrophil % : x  Auto Lymphocyte % : x  Auto Monocyte % : x  Auto Eosinophil % : x  Auto Basophil % : x      02-21    141  |  108  |  20<H>  ----------------------------<  77  3.7   |  27  |  0.65    Ca    7.4<L>      21 Feb 2019 07:44  Phos  2.6     02-20  Mg     1.6     02-20    TPro  5.2<L>  /  Alb  1.4<L>  /  TBili  0.4  /  DBili  x   /  AST  26  /  ALT  38  /  AlkPhos  131<H>  02-20        Occult Blood, Feces (01.24.19 @ 18:01)    Occult Blood, Feces: Positive    Iron with Total Binding Capacity in AM (02.21.19 @ 07:44)    Iron - Total Binding Capacity.: 105 ug/dL    % Saturation, Iron: 38 %    Iron Total, Serum: 40: Specimen is moderately hemolyzed, results may be affected ug/dL    Unsaturated Iron Binding Capacity: 65 ug/dL        Urinalysis (02.20.19 @ 00:53)    Blood, Urine: Negative    pH Urine: 6.0    Glucose Qualitative, Urine: Negative    Color: Yellow    Urine Appearance: Clear    Bilirubin: Negative    Ketone - Urine: Negative    Specific Gravity: 1.005    Protein, Urine: Negative    Urobilinogen: Negative    Nitrite: Negative    Leukocyte Esterase Concentration: Negative      ECG  Ventricular Rate 97 BPM    Atrial Rate 97 BPM    P-R Interval 140 ms    QRS Duration 62 ms    Q-T Interval 316 ms    QTC Calculation(Bezet) 401 ms    P Axis 39 degrees    R Axis -14 degrees    T Axis 2 degrees    Diagnosis Line Normal sinus rhythm  Low voltage QRS  Cannot rule out Anterior infarct , age undetermined  Abnormal ECG    RADIOLOGY/IMAGING                EXAM:  CT ABDOMEN AND PELVIS OC IC                          EXAM:  CT CHEST OC IC                            PROCEDURE DATE:  02/21/2019          INTERPRETATION:  CT CHEST/ABDOMEN/PELVIS:     CLINICAL INFORMATION:  Shortness of breath. Generalized body swelling.  Past medical history of recurrent small bowel obstruction and chronic DVT.    COMPARISON: CT scan chest abdomen pelvis 1/24/2019.    PROCEDURE:  Using multislice helical CT, 2.5 mm sections were obtained   from the thoracic inlet through the ischial tuberosities.    Multiplanar reformatted images.    No central pulmonary artery defect is noted.  The evaluation for segmental pulmonary artery defect is limited on this   study.  Aberrrant right subclavian artery is noted.    No enlarged hilar or mediastinal lymphadenopathy is noted.    No pericardial effusion is noted.    There is a small right-sided pleural effusion and small to moderate   left-sided pleural effusion with underlying compressive atelectasis.    The central airwaysremain patent; no central endobronchial lesion is   noted.    There is diffuse soft tissue anasarca.  There is moderate volume of abdominal and pelvic ascites.    There is fatty infiltration of the liver. Cholelithiasis again noted.  No biliary ductal dilatation is noted.    No hydronephrosis is noted.    The abdominal aorta is normal in caliber.  No enlarged retroperitoneal lymphadenopathy is noted.  There is an infrarenal IVC filter.  There is a focal filling defect within the inferior cava, above the level   of the IVC filter, which may reflect clot.    There is suggestion of a filling defect in the left femoral vein, which   is noted on the prior CT scan.    Evaluation of the stomach is limited without distention.  There is diffuse gastric fold thickening.  There is a large fecal load throughout the right colon and transverse   colon through splenic flexure.  The colon is distended, including a distended, air-filled redundant   sigmoid colon. There is an abrupt transition in the distal sigmoid with   relative collapse of the rectum containing minimal stool.  There are probable thickened small bowel loops in the left abdomen.  No localized intra-abdominal fluid collection or pneumoperitoneum is   noted.    There is a Cain catheterwith air present within the urinary bladder.  Free fluid is present within the pelvis.    Impression:    IVC filter with filling defect in the suprarenal portion of the IVC   suggestive of thrombus.  Thrombus left femoral vein as noted on prior CT study.    Colonic distention with redundant sigmoid colon with abrupt transition   distally.  Findings, may reflect distal colonic obstruction; cannot exclude sigmoid   volvulus.  Differential includes a paralytic ileus related to ischemic bowel.    Theabove findings were discussed with Dr. ANN-MARIE oRss  by telephone at the   time of interpretation on 2/21/2019.

## 2019-02-21 NOTE — CONSULT NOTE ADULT - ASSESSMENT
63 Female from home with PMH of Recurrent SBO's, s/p exp lap, SB resection,  DVT ( Left UE , then Left LE) , PE , chronic leg swelling , comes in with complaint of generalized body swelling and sob. Admitted for anasarca.

## 2019-02-21 NOTE — PROGRESS NOTE ADULT - PROBLEM SELECTOR PLAN 2
Patient reports sob with cough with sputum * 1 day with reduced breath sounds on exam   CXR reports left retrocardiac opacity   Symptoms could just be from anasarca , however will start on abx empirically   Rocephin , Zithro   f/u procalcitonin : negative   Less likely pneumonia , will get CT chest

## 2019-02-21 NOTE — PROGRESS NOTE ADULT - ASSESSMENT
63 Female from home with PMH of Recurrent SBO's, s/p exp lap, SB resection,  DVT ( Left UE , then Left LE) , PE , chronic leg swelling , comes in with complaints of generalized body swelling and sob. Recently discharged from [facility] in January. Patient reports sob on exertion, even walking from room to bathroom ( [facility] 3), no orthopnea , no PND. Reports having minor streaks of rectal bleeding on and off from hemorrhoids.     CXR shows Left sided pleural effusion with likely left cardiac opacity. 63 Female from home with PMH of Recurrent SBO's, s/p exp lap, SB resection,  DVT ( Left UE , then Left LE) , PE , chronic leg swelling , comes in with complaints of generalized body swelling and sob. Recently discharged from [facility] in January. Patient reports sob on exertion, even walking from room to bathroom ( [facility] 3), no orthopnea , no PND. Reports having minor streaks of rectal bleeding on and off from hemorrhoids.     CXR shows Left sided pleural effusion with likely left cardiac opacity.  inc cea CT CHEST ABD AND PELVIS

## 2019-02-21 NOTE — PROGRESS NOTE ADULT - ASSESSMENT
1.	Possible pneumonia   ·	cont rocephin 1gm IV daily  D3  ·	cont azithromycin 500mg IV daily  D3  ·	going for CT A/P and chest later today

## 2019-02-21 NOTE — CONSULT NOTE ADULT - ASSESSMENT
1. Ileus  2. Dilated colon  3. Redundant left colon  4. Sigmoid volvulus unlikely  5. Right side fecal impaction    Suggestions:    1. NPO  2. IVF hydration  3. Monitor electrolytes closely  4.  Abdominal X-ray in am  5. Surgical evaluation  6. Check lactate level  7. Protonix daily  8. DVT prophylaxis

## 2019-02-21 NOTE — PROGRESS NOTE ADULT - SUBJECTIVE AND OBJECTIVE BOX
CHIEF COMPLAINT:Patient is a 63y old  Female who presents with a chief complaint of sob , generalized body swelling.Pt s/p paracenthesis.    	  REVIEW OF SYSTEMS:  CONSTITUTIONAL: No fever, weight loss, or fatigue  EYES: No eye pain, visual disturbances, or discharge  ENT:  No difficulty hearing, tinnitus, vertigo; No sinus or throat pain  NECK: No pain or stiffness  RESPIRATORY: No cough, wheezing, chills or hemoptysis; No Shortness of Breath  CARDIOVASCULAR: No chest pain, palpitations, passing out, dizziness, + leg swelling  GASTROINTESTINAL: No abdominal or epigastric pain. No nausea, vomiting, or hematemesis; No diarrhea or constipation. No melena or hematochezia.  GENITOURINARY: No dysuria, frequency, hematuria, or incontinence  NEUROLOGICAL: No headaches, memory loss, loss of strength, numbness, or tremors  SKIN: No itching, burning, rashes, or lesions   LYMPH Nodes: No enlarged glands  ENDOCRINE: No heat or cold intolerance; No hair loss  MUSCULOSKELETAL: No joint pain or swelling; No muscle, back, or extremity pain  PSYCHIATRIC: No depression, anxiety, mood swings, or difficulty sleeping  HEME/LYMPH: No easy bruising, or bleeding gums  ALLERGY AND IMMUNOLOGIC: No hives or eczema	      PHYSICAL EXAM:  T(C): 36.4 (02-21-19 @ 05:09), Max: 36.5 (02-20-19 @ 21:58)  HR: 83 (02-21-19 @ 10:15) (71 - 97)  BP: 111/72 (02-21-19 @ 10:15) (96/78 - 111/72)  RR: 16 (02-21-19 @ 10:15) (16 - 18)  SpO2: 96% (02-21-19 @ 10:15) (96% - 99%)  Wt(kg): --  I&O's Summary    20 Feb 2019 07:01  -  21 Feb 2019 07:00  --------------------------------------------------------  IN: 0 mL / OUT: 5300 mL / NET: -5300 mL    21 Feb 2019 07:01  -  21 Feb 2019 13:09  --------------------------------------------------------  IN: 0 mL / OUT: 1000 mL / NET: -1000 mL        Appearance: Normal	  HEENT:   Normal oral mucosa, PERRL, EOMI	  Lymphatic: No lymphadenopathy  Cardiovascular: Normal S1 S2, No JVD, No murmurs, +2 edema  Respiratory: Lungs clear to auscultation	  Psychiatry: A & O x 3, Mood & affect appropriate  Gastrointestinal:  Soft, Non-tender, + BS	  Skin: No rashes, No ecchymoses, No cyanosis	  Neurologic: Non-focal  Extremities: Normal range of motion, No clubbing, cyanosis +2 edema  Vascular: Peripheral pulses palpable 2+ bilaterally    MEDICATIONS  (STANDING):  azithromycin  IVPB      azithromycin  IVPB 500 milliGRAM(s) IV Intermittent every 24 hours  cefTRIAXone   IVPB 1 Gram(s) IV Intermittent every 24 hours  cefTRIAXone   IVPB      folic acid 1 milliGRAM(s) Oral daily  furosemide   Injectable 40 milliGRAM(s) IV Push every 12 hours  pantoprazole    Tablet 40 milliGRAM(s) Oral before breakfast  rivaroxaban 20 milliGRAM(s) Oral every 24 hours  spironolactone 25 milliGRAM(s) Oral daily      	  LABS:	 	                       9.2    6.05  )-----------( 280      ( 21 Feb 2019 07:44 )             30.5     02-21    141  |  108  |  20<H>  ----------------------------<  77  3.7   |  27  |  0.65    Ca    7.4<L>      21 Feb 2019 07:44  Phos  2.6     02-20  Mg     1.6     02-20    TPro  5.2<L>  /  Alb  1.4<L>  /  TBili  0.4  /  DBili  x   /  AST  26  /  ALT  38  /  AlkPhos  131<H>  02-20    proBNP: Serum Pro-Brain Natriuretic Peptide: 164 pg/mL (02-19 @ 13:04)  Serum Pro-Brain Natriuretic Peptide: 133 pg/mL (01-24 @ 18:41)    Lipid Profile: Cholesterol 53  LDL 15  HDL 29  TG 43    HgA1c: Hemoglobin A1C, Whole Blood: <4.0 % (02-20 @ 12:59)    TSH: Thyroid Stimulating Hormone, Serum: 1.97 uU/mL (02-20 @ 07:08)      Carcinoembryonic Antigen (02.21.19 @ 00:40)    Carcinoembryonic Antigen: 7.1: Method: Roche Electrochemiluminescence Immuno Assay  Values obtained with different assay methods or kits cannot be  used interchangeably.  Results cannot be interpreted as absolute evidence of the presence  or absence of malignant disease.   CEA Normal Ranges   _________________   Non-smoker: less than 3.9 ng/mL       Smoker: less than 5.5 ng/mL ng/mL    	    Iron with Total Binding Capacity in AM (02.21.19 @ 07:44)    Iron - Total Binding Capacity.: 105 ug/dL    % Saturation, Iron: 38 %    Iron Total, Serum: 40: Specimen is moderately hemolyzed, results may be affected ug/dL    Unsaturated Iron Binding Capacity: 65 ug/dL

## 2019-02-21 NOTE — CONSULT NOTE ADULT - REASON FOR ADMISSION
sob , generalized body swelling

## 2019-02-22 ENCOUNTER — TRANSCRIPTION ENCOUNTER (OUTPATIENT)
Age: 64
End: 2019-02-22

## 2019-02-22 LAB
ALBUMIN FLD-MCNC: <0.2 G/DL — SIGNIFICANT CHANGE UP
ALBUMIN SERPL ELPH-MCNC: 1 G/DL — LOW (ref 3.5–5)
ALP SERPL-CCNC: 107 U/L — SIGNIFICANT CHANGE UP (ref 40–120)
ALT FLD-CCNC: 29 U/L DA — SIGNIFICANT CHANGE UP (ref 10–60)
ANION GAP SERPL CALC-SCNC: 5 MMOL/L — SIGNIFICANT CHANGE UP (ref 5–17)
AST SERPL-CCNC: 18 U/L — SIGNIFICANT CHANGE UP (ref 10–40)
BASOPHILS # BLD AUTO: 0.05 K/UL — SIGNIFICANT CHANGE UP (ref 0–0.2)
BASOPHILS NFR BLD AUTO: 0.9 % — SIGNIFICANT CHANGE UP (ref 0–2)
BILIRUB SERPL-MCNC: 0.4 MG/DL — SIGNIFICANT CHANGE UP (ref 0.2–1.2)
BUN SERPL-MCNC: 18 MG/DL — SIGNIFICANT CHANGE UP (ref 7–18)
CALCIUM SERPL-MCNC: 7 MG/DL — LOW (ref 8.4–10.5)
CHLORIDE SERPL-SCNC: 106 MMOL/L — SIGNIFICANT CHANGE UP (ref 96–108)
CO2 SERPL-SCNC: 30 MMOL/L — SIGNIFICANT CHANGE UP (ref 22–31)
CREAT SERPL-MCNC: 0.63 MG/DL — SIGNIFICANT CHANGE UP (ref 0.5–1.3)
EOSINOPHIL # BLD AUTO: 0.05 K/UL — SIGNIFICANT CHANGE UP (ref 0–0.5)
EOSINOPHIL NFR BLD AUTO: 0.9 % — SIGNIFICANT CHANGE UP (ref 0–6)
FERRITIN SERPL-MCNC: 56 NG/ML — SIGNIFICANT CHANGE UP (ref 15–150)
GLUCOSE BLDC GLUCOMTR-MCNC: 128 MG/DL — HIGH (ref 70–99)
GLUCOSE BLDC GLUCOMTR-MCNC: 133 MG/DL — HIGH (ref 70–99)
GLUCOSE BLDC GLUCOMTR-MCNC: 74 MG/DL — SIGNIFICANT CHANGE UP (ref 70–99)
GLUCOSE BLDC GLUCOMTR-MCNC: 78 MG/DL — SIGNIFICANT CHANGE UP (ref 70–99)
GLUCOSE FLD-MCNC: 109 MG/DL — SIGNIFICANT CHANGE UP
GLUCOSE SERPL-MCNC: 71 MG/DL — SIGNIFICANT CHANGE UP (ref 70–99)
HCT VFR BLD CALC: 25.8 % — LOW (ref 34.5–45)
HGB BLD-MCNC: 8.1 G/DL — LOW (ref 11.5–15.5)
IMM GRANULOCYTES NFR BLD AUTO: 0.5 % — SIGNIFICANT CHANGE UP (ref 0–1.5)
LACTATE SERPL-SCNC: 1.5 MMOL/L — SIGNIFICANT CHANGE UP (ref 0.7–2)
LYMPHOCYTES # BLD AUTO: 1.06 K/UL — SIGNIFICANT CHANGE UP (ref 1–3.3)
LYMPHOCYTES # BLD AUTO: 18.4 % — SIGNIFICANT CHANGE UP (ref 13–44)
MAGNESIUM SERPL-MCNC: 1.6 MG/DL — SIGNIFICANT CHANGE UP (ref 1.6–2.6)
MCHC RBC-ENTMCNC: 28.5 PG — SIGNIFICANT CHANGE UP (ref 27–34)
MCHC RBC-ENTMCNC: 31.4 GM/DL — LOW (ref 32–36)
MCV RBC AUTO: 90.8 FL — SIGNIFICANT CHANGE UP (ref 80–100)
MONOCYTES # BLD AUTO: 0.52 K/UL — SIGNIFICANT CHANGE UP (ref 0–0.9)
MONOCYTES NFR BLD AUTO: 9 % — SIGNIFICANT CHANGE UP (ref 2–14)
NEUTROPHILS # BLD AUTO: 4.04 K/UL — SIGNIFICANT CHANGE UP (ref 1.8–7.4)
NEUTROPHILS NFR BLD AUTO: 70.3 % — SIGNIFICANT CHANGE UP (ref 43–77)
NIGHT BLUE STAIN TISS: SIGNIFICANT CHANGE UP
NRBC # BLD: 0 /100 WBCS — SIGNIFICANT CHANGE UP (ref 0–0)
PHOSPHATE SERPL-MCNC: 2.6 MG/DL — SIGNIFICANT CHANGE UP (ref 2.5–4.5)
PLATELET # BLD AUTO: 238 K/UL — SIGNIFICANT CHANGE UP (ref 150–400)
POTASSIUM SERPL-MCNC: 3.4 MMOL/L — LOW (ref 3.5–5.3)
POTASSIUM SERPL-SCNC: 3.4 MMOL/L — LOW (ref 3.5–5.3)
PROT FLD-MCNC: <1 G/DL — SIGNIFICANT CHANGE UP
PROT SERPL-MCNC: 4.1 G/DL — LOW (ref 6–8.3)
RBC # BLD: 2.84 M/UL — LOW (ref 3.8–5.2)
RBC # FLD: 24.5 % — HIGH (ref 10.3–14.5)
SODIUM SERPL-SCNC: 141 MMOL/L — SIGNIFICANT CHANGE UP (ref 135–145)
SPECIMEN SOURCE: SIGNIFICANT CHANGE UP
VIT B12 SERPL-MCNC: 508 PG/ML — SIGNIFICANT CHANGE UP (ref 232–1245)
WBC # BLD: 5.75 K/UL — SIGNIFICANT CHANGE UP (ref 3.8–10.5)
WBC # FLD AUTO: 5.75 K/UL — SIGNIFICANT CHANGE UP (ref 3.8–10.5)

## 2019-02-22 PROCEDURE — 74018 RADEX ABDOMEN 1 VIEW: CPT | Mod: 26

## 2019-02-22 RX ORDER — ALBUMIN HUMAN 25 %
50 VIAL (ML) INTRAVENOUS
Qty: 0 | Refills: 0 | Status: COMPLETED | OUTPATIENT
Start: 2019-02-22 | End: 2019-02-22

## 2019-02-22 RX ORDER — FUROSEMIDE 40 MG
20 TABLET ORAL
Qty: 0 | Refills: 0 | Status: DISCONTINUED | OUTPATIENT
Start: 2019-02-22 | End: 2019-02-27

## 2019-02-22 RX ORDER — POTASSIUM CHLORIDE 20 MEQ
40 PACKET (EA) ORAL ONCE
Qty: 0 | Refills: 0 | Status: COMPLETED | OUTPATIENT
Start: 2019-02-22 | End: 2019-02-22

## 2019-02-22 RX ADMIN — Medication 50 MILLILITER(S): at 18:15

## 2019-02-22 RX ADMIN — Medication 50 MILLILITER(S): at 13:33

## 2019-02-22 RX ADMIN — RIVAROXABAN 20 MILLIGRAM(S): KIT at 18:14

## 2019-02-22 RX ADMIN — Medication 20 MILLIGRAM(S): at 18:14

## 2019-02-22 RX ADMIN — Medication 1 MILLIGRAM(S): at 13:33

## 2019-02-22 RX ADMIN — Medication 40 MILLIEQUIVALENT(S): at 13:33

## 2019-02-22 RX ADMIN — Medication 50 MILLILITER(S): at 15:22

## 2019-02-22 NOTE — DIETITIAN INITIAL EVALUATION ADULT. - PROBLEM SELECTOR PLAN 2
Patient reports sob with cough with sputum * 1 day with reduced breath sounds on exam   CXR reports left retrocardiac opacity   Symptoms could just be from anasarca , however will start on abx empirically   Rocephin , Zithro   Obtain RVP   f/u procalcitonin   Dc abx in negative

## 2019-02-22 NOTE — DIETITIAN INITIAL EVALUATION ADULT. - PERTINENT LABORATORY DATA
02-22 Na141 mmol/L Glu 71 mg/dL K+ 3.4 mmol/L<L> Cr  0.63 mg/dL BUN 18 mg/dL 02-22 Phos 2.6 mg/dL 02-22 Alb 1.0 g/dL<L> 02-20 [id number] <4.0 %<L> 02-20 Chol 53 mg/dL LDL 15 mg/dL HDL 29 mg/dL<L> Trig 43 mg/dL

## 2019-02-22 NOTE — PROGRESS NOTE ADULT - SUBJECTIVE AND OBJECTIVE BOX
63y Female is under our care for possible pneumonia. Patient is feeling better and admits cough and sob has improved. Repeat CT chest from last night shows no pneumonia, so will dc antibiotics.  Remains afebrile with normal wbc count.     REVIEW OF SYSTEMS:  [  ] Not able to illicit  General: no fevers no malaise  +hunger  Chest: no cough no sob  GI: no nvd   Skin: no rashes  Musculoskeletal: no trauma no LBP  Neuro: no ha's no dizziness 	    MEDS:  no antibiotics     ALLERGIES: No Known Allergies    VITALS:  Vital Signs Last 24 Hrs  T(C): 36.6 (22 Feb 2019 05:11), Max: 36.7 (21 Feb 2019 21:00)  T(F): 97.9 (22 Feb 2019 05:11), Max: 98 (21 Feb 2019 21:00)  HR: 75 (22 Feb 2019 05:11) (75 - 90)  BP: 100/64 (22 Feb 2019 05:11) (100/64 - 114/80)  BP(mean): --  RR: 16 (22 Feb 2019 05:11) (16 - 18)  SpO2: 100% (22 Feb 2019 05:11) (94% - 100%)      PHYSICAL EXAM:  HEENT: n/a  Neck: supple no LN's   Respiratory: left rhoncherous sounds no rales   Cardiovascular: S1 S2 reg no murmurs  Gastrointestinal: +BS with soft, nondistended abdomen; nontender  Extremities: bilateral arm/ leg 1+ pitting edema   Skin: no rashes  Ortho: n/a  Neuro: AAO x 3      LABS/DIAGNOSTIC TESTS:                        8.1    5.75  )-----------( 238      ( 22 Feb 2019 08:02 )             25.8   02-22    141  |  106  |  18  ----------------------------<  71  3.4<L>   |  30  |  0.63    Ca    7.0<L>      22 Feb 2019 08:02  Phos  2.6     02-22  Mg     1.6     02-22    TPro  4.1<L>  /  Alb  1.0<L>  /  TBili  0.4  /  DBili  x   /  AST  18  /  ALT  29  /  AlkPhos  107  02-22        CULTURES:   .Urine  02-20 @ 10:25   No growth  --  --        RADIOLOGY:    EXAM:  CT ABDOMEN AND PELVIS OC IC                        EXAM:  CT CHEST OC IC                        PROCEDURE DATE:  02/21/2019    INTERPRETATION:  CT CHEST/ABDOMEN/PELVIS:     CLINICAL INFORMATION:  Shortness of breath. Generalized body swelling.  Past medical history of recurrent small bowel obstruction and chronic DVT.    COMPARISON: CT scan chest abdomen pelvis 1/24/2019.    PROCEDURE:  Using multislice helical CT, 2.5 mm sections were obtained   from the thoracic inlet through the ischial tuberosities.    Multiplanar reformatted images.    No central pulmonary artery defect is noted.  The evaluation for segmental pulmonary artery defect is limited on this   study.  Aberrrant right subclavian artery is noted.    No enlarged hilar or mediastinal lymphadenopathy is noted.    No pericardial effusion is noted.    There is a small right-sided pleural effusion and small to moderate   left-sided pleural effusion with underlying compressive atelectasis.    The central airwaysremain patent; no central endobronchial lesion is   noted.    There is diffuse soft tissue anasarca.  There is moderate volume of abdominal and pelvic ascites.    There is fatty infiltration of the liver. Cholelithiasis again noted.  No biliary ductal dilatation is noted.    No hydronephrosis is noted.    The abdominal aorta is normal in caliber.  No enlarged retroperitoneal lymphadenopathy is noted.  There is an infrarenal IVC filter.  There is a focal filling defect within the inferior cava, above the level   of the IVC filter, which may reflect clot.    There is suggestion of a filling defect in the left femoral vein, which   is noted on the prior CT scan.    Evaluation of the stomach is limited without distention.  There is diffuse gastric fold thickening.  There is a large fecal load throughout the right colon and transverse   colon through splenic flexure.  The colon is distended, including a distended, air-filled redundant   sigmoid colon. There is an abrupt transition in the distal sigmoid with   relative collapse of the rectum containing minimal stool.  There are probable thickened small bowel loops in the left abdomen.  No localized intra-abdominal fluid collection or pneumoperitoneum is   noted.    There is a Cain catheterwith air present within the urinary bladder.  Free fluid is present within the pelvis.    Impression:    IVC filter with filling defect in the suprarenal portion of the IVC   suggestive of thrombus.  Thrombus left femoral vein as noted on prior CT study.    Colonic distention with redundant sigmoid colon with abrupt transition   distally.  Findings, may reflect distal colonic obstruction; cannot exclude sigmoid   volvulus.  Differential includes a paralytic ileus related to ischemic bowel.    Theabove findings were discussed with Dr. ANN-MARIE Ross  by telephone at the   time of interpretation on 2/21/2019.    Other findings as discussed.

## 2019-02-22 NOTE — PROGRESS NOTE ADULT - ASSESSMENT
1. Chronic Ileus  2. Redundant colon  3. Fecal impaction    Suggestions:    1. Monitor electrolytes  2. Consider po laxative  3. Avoid NSAID  4. Abdominal Xray in AM  5. Surgical follow up  6. DVT prophylaxis

## 2019-02-22 NOTE — PROGRESS NOTE ADULT - ASSESSMENT
63 year old lady with repeated sbo s/p multiple surgery and multiple episodes of DVT on xarelto.  She was eating ok and has BM 1-2X a day.  She was admitted for swelling of whole body and SOBE.  no fever, chill, abd pain or cp.

## 2019-02-22 NOTE — DISCHARGE NOTE ADULT - HOSPITAL COURSE
HPI and Ed course:   63 Female from home with PMH of Recurrent SBO's, s/p exp lap, SB resection,  DVT ( Left UE , then Left LE) , PE , chronic leg swelling , comes in with complaint of generalized body swelling and sob. Patient reports having bilateral le swelling for a long time now ( year or so) , however has been having fluid retention in abdomen and b/l upper extremities ever since she was discharged from [facility] in January. Patient reports sob on exertion, even walking from room to bathroom ( [facility] 3), no orthopnea , no PND. Denies any chest pain , palpitations, nausea , vomiting , diarrhea , abdominal pain. Reports having cough with whitish sputum yesterday in addition to some wheezing. No h/o Lung dz, liver dz, no malignancy, no recent weight loss or loss of apetite.   Reports having limited mobilization. No h/o hepatitis , no family history of cancer. No sick contacts / no Tb exposure , travelled to [place] in December.   Reports having minor streaks of rectal bleeding on and off from hemorrhoids.   Last Mammogram and PAP 2 years ago : wNL; Last Colonoscopy in April 2018 : no cancer/ polyp  In Ed , BP: 119/77 mm hg , Hr: 105 , Temp : 98.1 F  Cbc   Cmp shows elevated bun , albumin of 0.9   T1 negative   CXR shows Left sided pleural effusion with likely left cardiac opacity.    1) Patient was admitted to medicine for Anasarca.   Was likely from hypoalbuminemia.   Could be from chronic state of immobilization, poor po intake in past due to sbo  No known heart failure, no JVD on exam , pro-bnp not so high (164) ; pending ECHO :normal   No known liver disease, lfts WNL   No known malignancy or risk factors , outpatient screening negative for cancer ; however CEA elevated ; CT abdomen shows no mass   No exposure to TB/ no night sweats / weight loss   On lasix 40 bid at home   Was diuresed with lasix 40iv bid, tapered as tolerated.   f/u TSH wnl  UA negative for proteinuria    Patient underwent paracentesis in the course of admission.   Fluid studies showed :   CT scans ( pan CT ) ruled out malignancy.     2) Patient had a retrocardiac opacity on CXR and was started on Rocephin and Zithro.   CT chest was done which showed no pna, abx discontinued.     3) Patient had a CT yest for r/o malignancy which showed Colonic distention with redundant sigmoid colon with abrupt transition distally. Findings, may reflect distal colonic obstruction; cannot exclude sigmoid volvulus. Differential includes a paralytic ileus related to ischemic bowel.  GI consulted , recommends NPO , serial Abdo exams  Lactate minimally elevated : 2.5 -->1.4   Surgery following and recommends care per GI   repeat abdo xray this am shows same colonic distension   Advanced diet per GI , Dr. Chatterjee.  4 ) Last EGD / Colonoscopy : Esophagitis , gastritis , diverticulosis. cbc was stable , no active bleeding     After stabilization , decision was made to discharge patient. HPI and Ed course:   63 Female from home with PMH of Recurrent SBO's, s/p exp lap, SB resection,  DVT ( Left UE , then Left LE) , PE , chronic leg swelling , comes in with complaint of generalized body swelling and sob. Patient reports having bilateral le swelling for a long time now ( year or so) , however has been having fluid retention in abdomen and b/l upper extremities ever since she was discharged from [facility] in January. Patient reports sob on exertion, even walking from room to bathroom ( [facility] 3), no orthopnea , no PND. Denies any chest pain , palpitations, nausea , vomiting , diarrhea , abdominal pain. Reports having cough with whitish sputum yesterday in addition to some wheezing. No h/o Lung dz, liver dz, no malignancy, no recent weight loss or loss of apetite.   Reports having limited mobilization. No h/o hepatitis , no family history of cancer. No sick contacts / no Tb exposure , travelled to [place] in December.   Reports having minor streaks of rectal bleeding on and off from hemorrhoids.   Last Mammogram and PAP 2 years ago : wNL; Last Colonoscopy in April 2018 : no cancer/ polyp  In Ed , BP: 119/77 mm hg , Hr: 105 , Temp : 98.1 F  Cbc   Cmp shows elevated bun , albumin of 0.9   T1 negative   CXR shows Left sided pleural effusion with likely left cardiac opacity.    1) Patient was admitted to medicine for Anasarca.   Was likely from hypoalbuminemia.   Could be from chronic state of immobilization, poor po intake in past due to sbo  No known heart failure, no JVD on exam , pro-bnp not so high (164) ; pending ECHO :normal   No known liver disease, lfts WNL   No known malignancy or risk factors , outpatient screening negative for cancer ; however CEA elevated ; CT abdomen shows no mass   No exposure to TB/ no night sweats / weight loss   On lasix 40 bid at home   Was diuresed with lasix 40iv bid, tapered as tolerated.   f/u TSH wnl  UA negative for proteinuria    Patient underwent paracentesis in the course of admission.   Fluid studies showed albumin <0.2, glucose 109 and total protein <1.0. SAAG 0.8 with total protein < 2.5 --> TB vs nephrotic syndrome  Quantiferon ordered to r/o TB  CT scans ( pan CT ) ruled out malignancy.   Hepatology consulted, recommended high protein diet for anasarca.     2) Patient had a retrocardiac opacity on CXR and was started on Rocephin and Zithro.   CT chest was done which showed no pna, abx discontinued.     3) Patient had a CT yest for r/o malignancy which showed Colonic distention with redundant sigmoid colon with abrupt transition distally. Findings, may reflect distal colonic obstruction; cannot exclude sigmoid volvulus. Differential includes a paralytic ileus related to ischemic bowel.  GI consulted , recommends NPO , serial Abdo exams  Lactate minimally elevated : 2.5 -->1.4   Surgery following and recommends care per GI   repeat abdo xray this am shows same colonic distension   Advanced diet per GI , Dr. Chatterjee.  4 ) Last EGD / Colonoscopy : Esophagitis , gastritis , diverticulosis. no active bleeding   5) Anemia: Hemoglobin dropped to 6.9 on 2/24. Occult negative. Received 1U PRBC, CBC remained stable post transfusion.     After stabilization , decision was made to discharge patient. HPI and Ed course:   63 Female from home with PMH of Recurrent SBO's, s/p exp lap, SB resection,  DVT ( Left UE , then Left LE) , PE , chronic leg swelling , comes in with complaint of generalized body swelling and sob. Patient reports having bilateral le swelling for a long time now ( year or so) , however has been having fluid retention in abdomen and b/l upper extremities ever since she was discharged from [facility] in January. Patient reports sob on exertion, even walking from room to bathroom ( [facility] 3), no orthopnea , no PND. Denies any chest pain , palpitations, nausea , vomiting , diarrhea , abdominal pain. Reports having cough with whitish sputum yesterday in addition to some wheezing. No h/o Lung dz, liver dz, no malignancy, no recent weight loss or loss of apetite.   Reports having limited mobilization. No h/o hepatitis , no family history of cancer. No sick contacts / no Tb exposure , travelled to [place] in December.   Reports having minor streaks of rectal bleeding on and off from hemorrhoids.   Last Mammogram and PAP 2 years ago : wNL; Last Colonoscopy in April 2018 : no cancer/ polyp      1) Patient was admitted to medicine for Anasarca. Was likely from hypoalbuminemia. Could be from chronic state of immobilization, poor po intake in past due to sbo. No known heart failure, no JVD on exam , pro-bnp not so high (164) ; pending ECHO :normal.   No known liver disease, lfts WNL   No known malignancy or risk factors , outpatient screening negative for cancer ; however CEA elevated ; CT abdomen shows no mass. No exposure to TB/ no night sweats / weight loss.    On lasix 40 bid at home   Was diuresed with lasix 40iv bid, tapered as tolerated.   f/u TSH wnl  UA negative for proteinuria    Patient underwent paracentesis in the course of admission.   Fluid studies showed albumin <0.2, glucose 109 and total protein <1.0. SAAG 0.8 with total protein < 2.5 --> TB vs nephrotic syndrome  Quantiferon ordered to r/o TB  CT scans ( pan CT ) ruled out malignancy.   Hepatology consulted, recommended high protein diet for anasarca.     2) Patient had a retrocardiac opacity on CXR and was started on Rocephin and Zithro.   CT chest was done which showed no pna, abx discontinued.     3) Patient had a CT yest for r/o malignancy which showed Colonic distention with redundant sigmoid colon with abrupt transition distally. Findings, may reflect distal colonic obstruction; cannot exclude sigmoid volvulus. Differential includes a paralytic ileus related to ischemic bowel.  GI consulted , recommends NPO , serial Abdo exams  Lactate minimally elevated : 2.5 -->1.4   Surgery following and recommends care per GI   repeat abdo xray this am shows same colonic distension   Advanced diet per GI , Dr. Chatterjee.  4 ) Last EGD / Colonoscopy : Esophagitis , gastritis , diverticulosis. no active bleeding   5) Anemia: Hemoglobin dropped to 6.9 on 2/24. Occult negative. Received 1U PRBC, CBC remained stable post transfusion.     After stabilization , decision was made to discharge patient.

## 2019-02-22 NOTE — DISCHARGE NOTE ADULT - PATIENT PORTAL LINK FT
You can access the [facility] Patient Portal, offered by [facility], by registering with the following website: [email or web address]

## 2019-02-22 NOTE — PROGRESS NOTE ADULT - ASSESSMENT
62 y/o female with colonic distention & redundancy of her sigmoid colon      1. NPO  2. GI follow up, colonoscopy/sigmoidoscopy?  3. Serial abdominal exams  4. Patient says she does not want surgery   5. Will continue to follow

## 2019-02-22 NOTE — DISCHARGE NOTE ADULT - CARE PLAN
Principal Discharge DX:	Anasarca  Goal:	Prevent future episodes  Assessment and plan of treatment:	You came in with Anasarca, was likely from hypoalbuminemia. Paracentesis was done which showed:   All cardiac/ renal/ hepatic / endocrine causes were ruled out. You were given Lasix and anasarca improved. Please follow up with PMD in 1 week of discharge.  Secondary Diagnosis:	Colonic disorder  Assessment and plan of treatment:	Patient had a CT yest for r/o malignancy which showed Colonic distention with redundant sigmoid colon with abrupt transition distally. Findings, may reflect distal colonic obstruction; cannot exclude sigmoid volvulus. Differential includes a paralytic ileus related to ischemic bowel. GI consulted , recommends NPO , serial Abdo exams. Lactate minimally elevated : 2.5 -->1.4. Surgery following and recommends care per GI. repeat abdo xray this am shows same colonic distension. Advanced diet per GI , Dr. Chatterjee.  Secondary Diagnosis:	Pulmonary embolism  Assessment and plan of treatment:	c/w Xarelto  Secondary Diagnosis:	Anemia  Goal:	hb >7  Assessment and plan of treatment:	You have anemia likely from chronic dz and RINKU. CBC stayed stable. Principal Discharge DX:	Anasarca  Goal:	Prevent future episodes  Assessment and plan of treatment:	You came in with Anasarca, was likely from hypoalbuminemia. Paracentesis was done which showed SAAG 0.8 with total protein <2.5 --> TB vs. nephrotic causes. Quantiferon ordered to R/O TB.    All cardiac/ renal/ hepatic / endocrine causes were ruled out. You were given Lasix and anasarca improved. Please follow up with PMD in 1 week of discharge.  Secondary Diagnosis:	Colonic disorder  Assessment and plan of treatment:	Patient had a CT yest for r/o malignancy which showed Colonic distention with redundant sigmoid colon with abrupt transition distally. Findings, may reflect distal colonic obstruction; cannot exclude sigmoid volvulus. Differential includes a paralytic ileus related to ischemic bowel. GI consulted , recommends NPO , serial Abdo exams. Lactate minimally elevated : 2.5 -->1.4. Surgery following and recommends care per GI. repeat abdo xray this am shows same colonic distension. Advanced diet per GI , Dr. Chatterjee.  Secondary Diagnosis:	Pulmonary embolism  Assessment and plan of treatment:	c/w Xarelto  Secondary Diagnosis:	Anemia  Goal:	hb >7  Assessment and plan of treatment:	You have anemia likely from chronic dz and RINKU. Hemoglobin was found to be 6.9 on 2/24 for which you received 1u PRBC. CBC remained stable post transfusion. Principal Discharge DX:	Anasarca  Goal:	Prevent future episodes  Assessment and plan of treatment:	You came in with diffuse swelling, was likely from hypoalbuminemia. Your Albumin (Protein) level improve from 1 to 1.4 after supplemet. Continue high protein diet.   Paracentesis was done which showed SAAG 0.8 with total protein <2.5. Quantiferon for TB is negative.   All cardiac/ renal/ hepatic / endocrine causes were ruled out. You were given Lasix and anasarca improved. Please follow up with PMD in 1 week of discharge.  Secondary Diagnosis:	Colonic disorder  Assessment and plan of treatment:	Patient had a CT yest for r/o malignancy which showed Colonic distention with redundant sigmoid colon with abrupt transition distally. Findings, may reflect distal colonic obstruction; cannot exclude sigmoid volvulus.  Surgery team was consulted and recommended medical management.  Secondary Diagnosis:	Pulmonary embolism  Assessment and plan of treatment:	c/w Xarelto  Secondary Diagnosis:	Anemia  Goal:	hb >7  Assessment and plan of treatment:	You have anemia likely from chronic dz and RINKU. Hemoglobin was found to be 6.9 on 2/24 for which you received 1u PRBC. CBC remained stable post transfusion.

## 2019-02-22 NOTE — DIETITIAN INITIAL EVALUATION ADULT. - NUTRITION INTERVENTION
Meals and Snack/Medical Food Supplements/Collaboration and Referral of Nutrition Care/Vitamin/Nutrition Education

## 2019-02-22 NOTE — DIETITIAN INITIAL EVALUATION ADULT. - FACTORS AFF FOOD INTAKE
pain/weakness, generalized edema, Pulmonary embolism, SBO, anemia, h/o intestinal surgery/other (specify) weakness, generalized edema, SOB, Pulmonary embolism, SBO, anemia, h/o intestinal surgery/pain/other (specify)

## 2019-02-22 NOTE — PROGRESS NOTE ADULT - SUBJECTIVE AND OBJECTIVE BOX
[   ] ICU                                          [   ] CCU                                      [ x  ] Medical Floor    Patient is comfortable reports last BM this morning. No new complaints reported, No abdominal pain, N/V, hematemesis, hematochezia, melena, fever, chills, chest pain, SOB, cough or diarrhea reported.    VITALS  Vital Signs Last 24 Hrs  T(C): 36.4 (22 Feb 2019 14:15), Max: 36.7 (21 Feb 2019 21:00)  T(F): 97.6 (22 Feb 2019 14:15), Max: 98 (21 Feb 2019 21:00)  HR: 81 (22 Feb 2019 18:20) (75 - 87)  BP: 108/65 (22 Feb 2019 18:20) (98/59 - 108/65)     RR: 18 (22 Feb 2019 14:15) (16 - 18)  SpO2: 98% (22 Feb 2019 14:15) (98% - 100%)       MEDICATIONS  (STANDING):  dextrose 5%. 1000 milliLiter(s) (50 mL/Hr) IV Continuous <Continuous>  dextrose 50% Injectable 12.5 Gram(s) IV Push once  dextrose 50% Injectable 25 Gram(s) IV Push once  dextrose 50% Injectable 25 Gram(s) IV Push once  folic acid 1 milliGRAM(s) Oral daily  furosemide   Injectable 20 milliGRAM(s) IV Push two times a day  insulin lispro (HumaLOG) corrective regimen sliding scale   SubCutaneous every 6 hours  pantoprazole    Tablet 40 milliGRAM(s) Oral before breakfast  rivaroxaban 20 milliGRAM(s) Oral every 24 hours  spironolactone 25 milliGRAM(s) Oral daily    MEDICATIONS  (PRN):  acetaminophen   Tablet .. 650 milliGRAM(s) Oral every 6 hours PRN Mild Pain (1 - 3), Moderate Pain (4 - 6)  dextrose 40% Gel 15 Gram(s) Oral once PRN Blood Glucose LESS THAN 70 milliGRAM(s)/deciliter  glucagon  Injectable 1 milliGRAM(s) IntraMuscular once PRN Glucose LESS THAN 70 milligrams/deciliter                            8.1    5.75  )-----------( 238      ( 22 Feb 2019 08:02 )             25.8       02-22    141  |  106  |  18  ----------------------------<  71  3.4<L>   |  30  |  0.63    Ca    7.0<L>      22 Feb 2019 08:02  Phos  2.6     02-22  Mg     1.6     02-22    TPro  4.1<L>  /  Alb  1.0<L>  /  TBili  0.4  /  DBili  x   /  AST  18  /  ALT  29  /  AlkPhos  107  02-22

## 2019-02-22 NOTE — DISCHARGE NOTE ADULT - MEDICATION SUMMARY - MEDICATIONS TO TAKE
I will START or STAY ON the medications listed below when I get home from the hospital:    spironolactone 25 mg oral tablet  -- 1 tab(s) by mouth once a day  -- Indication: For Ascites    Xarelto 15 mg oral tablet  -- 1 tab(s) by mouth 2 times a day   -- Check with your doctor before becoming pregnant.  It is very important that you take or use this exactly as directed.  Do not skip doses or discontinue unless directed by your doctor.  Obtain medical advice before taking any non-prescription drugs as some may affect the action of this medication.  Take with food.    -- Indication: For PE    Lasix 20 mg oral tablet  -- 1 tab(s) by mouth 2 times a day   -- Avoid prolonged or excessive exposure to direct and/or artificial sunlight while taking this medication.  It is very important that you take or use this exactly as directed.  Do not skip doses or discontinue unless directed by your doctor.  It may be advisable to drink a full glass orange juice or eat a banana daily while taking this medication.    -- Indication: For Ascites    Linzess 145 mcg oral capsule  -- 1 cap(s) by mouth once a day  -- Indication: For IBS    ferrous sulfate 325 mg (65 mg elemental iron) oral tablet  -- 1 tab(s) by mouth once a day   -- Check with your doctor before becoming pregnant.  Do not chew, break, or crush.  May discolor urine or feces.    -- Indication: For Anemia    midodrine 5 mg oral tablet  -- 1 tab(s) by mouth every 8 hours  -- Indication: For Low BP    pantoprazole 40 mg oral delayed release tablet  -- 1 tab(s) by mouth once a day  for 4 week   -- Indication: For Gastritis     folic acid 0.4 mg oral tablet  -- 1 tab(s) by mouth once a day   -- Indication: For Vitamin supplement    Vitamin D2 50,000 intl units (1.25 mg) oral capsule  -- orally once a month  -- Indication: For Vitamin supplement

## 2019-02-22 NOTE — PROGRESS NOTE ADULT - SUBJECTIVE AND OBJECTIVE BOX
Patient is a 63y old  Female who presents with a chief complaint of sob , generalized body swelling (22 Feb 2019 07:55)      HPI:  63 Female from home with PMH of Recurrent SBO's, s/p exp lap, SB resection,  DVT ( Left UE , then Left LE) , PE , chronic leg swelling , comes in with complaint of generalized body swelling and sob. Patient reports having bilateral le swelling for a long time now ( year or so) , however has been having fluid retention in abdomen and b/l upper extremities ever since she was discharged from [facility] in January. Patient reports sob on exertion, even walking from room to bathroom ( [facility] 3), no orthopnea , no PND. Denies any chest pain , palpitations, nausea , vomiting , diarrhea , abdominal pain. Reports having cough with whitish sputum yesterday in addition to some wheezing. No h/o Lung dz, liver dz, no malignancy, no recent weight loss or loss of apetite.   Reports having limited mobilization. No h/o hepatitis , no family history of cancer. No sick contacts / no Tb exposure , travelled to [place] in December.   Reports having minor streaks of rectal bleeding on and off from hemorrhoids.   Last Mammogram and PAP 2 years ago : wNL; Last Colonoscopy in April 2018 : no cancer/ polyp    In Ed , BP: 119/77 mm hg , Hr: 105 , Temp : 98.1 F  Cbc   Cmp shows elevated bun , albumin of 0.9   T1 negative   CXR shows Left sided pleural effusion with likely left cardiac opacity. (19 Feb 2019 16:26)       ROS:  Negative except for:    PAST MEDICAL & SURGICAL HISTORY:  Pulmonary embolism  DVT (deep venous thrombosis)  SBO (small bowel obstruction)  History of intestinal surgery      SOCIAL HISTORY:    FAMILY HISTORY:  No pertinent family history in first degree relatives      MEDICATIONS  (STANDING):  dextrose 5%. 1000 milliLiter(s) (50 mL/Hr) IV Continuous <Continuous>  dextrose 50% Injectable 12.5 Gram(s) IV Push once  dextrose 50% Injectable 25 Gram(s) IV Push once  dextrose 50% Injectable 25 Gram(s) IV Push once  folic acid 1 milliGRAM(s) Oral daily  furosemide   Injectable 40 milliGRAM(s) IV Push every 12 hours  insulin lispro (HumaLOG) corrective regimen sliding scale   SubCutaneous every 6 hours  pantoprazole    Tablet 40 milliGRAM(s) Oral before breakfast  rivaroxaban 20 milliGRAM(s) Oral every 24 hours  spironolactone 25 milliGRAM(s) Oral daily    MEDICATIONS  (PRN):  acetaminophen   Tablet .. 650 milliGRAM(s) Oral every 6 hours PRN Mild Pain (1 - 3), Moderate Pain (4 - 6)  dextrose 40% Gel 15 Gram(s) Oral once PRN Blood Glucose LESS THAN 70 milliGRAM(s)/deciliter  glucagon  Injectable 1 milliGRAM(s) IntraMuscular once PRN Glucose LESS THAN 70 milligrams/deciliter      Allergies    No Known Allergies    Intolerances        Vital Signs Last 24 Hrs  T(C): 36.6 (22 Feb 2019 05:11), Max: 36.7 (21 Feb 2019 21:00)  T(F): 97.9 (22 Feb 2019 05:11), Max: 98 (21 Feb 2019 21:00)  HR: 75 (22 Feb 2019 05:11) (75 - 90)  BP: 100/64 (22 Feb 2019 05:11) (100/64 - 114/80)  BP(mean): --  RR: 16 (22 Feb 2019 05:11) (16 - 18)  SpO2: 100% (22 Feb 2019 05:11) (94% - 100%)    PHYSICAL EXAM  General: adult in NAD  HEENT: clear oropharynx, anicteric sclera, pink conjunctiva  Neck: supple  CV: normal S1/S2 with no murmur rubs or gallops  Lungs: positive air movement b/l ant lungs,clear to auscultation, no wheezes, no rales  Abdomen: soft non-tender non-distended, no hepatosplenomegaly  Ext: no clubbing cyanosis or edema  Skin: no rashes and no petechiae  Neuro: alert and oriented X 4, no focal deficits      LABS:                          8.1    5.75  )-----------( 238      ( 22 Feb 2019 08:02 )             25.8         Mean Cell Volume : 90.8 fl  Mean Cell Hemoglobin : 28.5 pg  Mean Cell Hemoglobin Concentration : 31.4 gm/dL  Auto Neutrophil # : 4.04 K/uL  Auto Lymphocyte # : 1.06 K/uL  Auto Monocyte # : 0.52 K/uL  Auto Eosinophil # : 0.05 K/uL  Auto Basophil # : 0.05 K/uL  Auto Neutrophil % : 70.3 %  Auto Lymphocyte % : 18.4 %  Auto Monocyte % : 9.0 %  Auto Eosinophil % : 0.9 %  Auto Basophil % : 0.9 %      Serial CBC's  02-22 @ 08:02  Hct-25.8 / Hgb-8.1 / Plat-238 / RBC-2.84 / WBC-5.75  Serial CBC's  02-21 @ 07:44  Hct-30.5 / Hgb-9.2 / Plat-280 / RBC-3.23 / WBC-6.05  Serial CBC's  02-20 @ 07:08  Hct-30.0 / Hgb-9.2 / Plat-321 / RBC-3.26 / WBC-7.18  Serial CBC's  02-19 @ 20:45  Hct-29.3 / Hgb-9.0 / Plat-339 / RBC-3.18 / WBC-8.58      02-22    141  |  106  |  18  ----------------------------<  71  3.4<L>   |  30  |  0.63    Ca    7.0<L>      22 Feb 2019 08:02  Phos  2.6     02-22  Mg     1.6     02-22    TPro  4.1<L>  /  Alb  1.0<L>  /  TBili  0.4  /  DBili  x   /  AST  18  /  ALT  29  /  AlkPhos  107  02-22          Iron - Total Binding Capacity.: 105 ug/dL (02-21 @ 07:44)              BLOOD SMEAR INTERPRETATION:       RADIOLOGY & ADDITIONAL STUDIES:< from: CT Abdomen and Pelvis w/ Oral Cont and w/ IV Cont (02.21.19 @ 18:03) >  INTERPRETATION:  CT CHEST/ABDOMEN/PELVIS:     CLINICAL INFORMATION:  Shortness of breath. Generalized body swelling.  Past medical history of recurrent small bowel obstruction and chronic DVT.    COMPARISON: CT scan chest abdomen pelvis 1/24/2019.    PROCEDURE:  Using multislice helical CT, 2.5 mm sections were obtained   from the thoracic inlet through the ischial tuberosities.    Multiplanar reformatted images.    No central pulmonary artery defect is noted.  The evaluation for segmental pulmonary artery defect is limited on this   study.  Aberrrant right subclavian artery is noted.    No enlarged hilar or mediastinal lymphadenopathy is noted.    No pericardial effusion is noted.    There is a small right-sided pleural effusion and small to moderate   left-sided pleural effusion with underlying compressive atelectasis.    The central airwaysremain patent; no central endobronchial lesion is   noted.    There is diffuse soft tissue anasarca.  There is moderate volume of abdominal and pelvic ascites.    There is fatty infiltration of the liver. Cholelithiasis again noted.  No biliary ductal dilatation is noted.    No hydronephrosis is noted.      < end of copied text >  < from: CT Abdomen and Pelvis w/ Oral Cont and w/ IV Cont (02.21.19 @ 18:03) >  The abdominal aorta is normal in caliber.  No enlarged retroperitoneal lymphadenopathy is noted.  There is an infrarenal IVC filter.  There is a focal filling defect within the inferior cava, above the level   of the IVC filter, which may reflect clot.    There is suggestion of a filling defect in the left femoral vein, which   is noted on the prior CT scan.    Evaluation of the stomach is limited without distention.  There is diffuse gastric fold thickening.  There is a large fecal load throughout the right colon and transverse   colon through splenic flexure.  The colon is distended, including a distended, air-filled redundant   sigmoid colon. There is an abrupt transition in the distal sigmoid with   relative collapse of the rectum containing minimal stool.  There are probable thickened small bowel loops in the left abdomen.  No localized intra-abdominal fluid collection or pneumoperitoneum is   noted.    < end of copied text >  < from: CT Abdomen and Pelvis w/ Oral Cont and w/ IV Cont (02.21.19 @ 18:03) >  There is a Cain catheterwith air present within the urinary bladder.  Free fluid is present within the pelvis.    Impression:    IVC filter with filling defect in the suprarenal portion of the IVC   suggestive of thrombus.  Thrombus left femoral vein as noted on prior CT study.    Colonic distention with redundant sigmoid colon with abrupt transition   distally.  Findings, may reflect distal colonic obstruction; cannot exclude sigmoid   volvulus.  Differential includes a paralytic ileus related to ischemic bowel.    Theabove findings were discussed with Dr. ANN-MARIE Ross  by telephone at the   time of interpretation on 2/21/2019.    Other findings as discussed.    < end of copied text >

## 2019-02-22 NOTE — DISCHARGE NOTE ADULT - PLAN OF CARE
Prevent future episodes You came in with Anasarca, was likely from hypoalbuminemia. Paracentesis was done which showed:   All cardiac/ renal/ hepatic / endocrine causes were ruled out. You were given Lasix and anasarca improved. Please follow up with PMD in 1 week of discharge. Patient had a CT yest for r/o malignancy which showed Colonic distention with redundant sigmoid colon with abrupt transition distally. Findings, may reflect distal colonic obstruction; cannot exclude sigmoid volvulus. Differential includes a paralytic ileus related to ischemic bowel. GI consulted , recommends NPO , serial Abdo exams. Lactate minimally elevated : 2.5 -->1.4. Surgery following and recommends care per GI. repeat abdo xray this am shows same colonic distension. Advanced diet per GI , Dr. Chatterjee. c/w Xarelto hb >7 You have anemia likely from chronic dz and RINKU. CBC stayed stable. You came in with Anasarca, was likely from hypoalbuminemia. Paracentesis was done which showed SAAG 0.8 with total protein <2.5 --> TB vs. nephrotic causes. Quantiferon ordered to R/O TB.    All cardiac/ renal/ hepatic / endocrine causes were ruled out. You were given Lasix and anasarca improved. Please follow up with PMD in 1 week of discharge. You have anemia likely from chronic dz and RINKU. Hemoglobin was found to be 6.9 on 2/24 for which you received 1u PRBC. CBC remained stable post transfusion. You came in with diffuse swelling, was likely from hypoalbuminemia. Your Albumin (Protein) level improve from 1 to 1.4 after supplemet. Continue high protein diet.   Paracentesis was done which showed SAAG 0.8 with total protein <2.5. Quantiferon for TB is negative.   All cardiac/ renal/ hepatic / endocrine causes were ruled out. You were given Lasix and anasarca improved. Please follow up with PMD in 1 week of discharge. Patient had a CT yest for r/o malignancy which showed Colonic distention with redundant sigmoid colon with abrupt transition distally. Findings, may reflect distal colonic obstruction; cannot exclude sigmoid volvulus.  Surgery team was consulted and recommended medical management.

## 2019-02-22 NOTE — PROGRESS NOTE ADULT - PROBLEM SELECTOR PLAN 3
Patient reports sob with cough with sputum * 1 day with reduced breath sounds on exam   CXR reports left retrocardiac opacity   Symptoms could just be from anasarca , however on abx empirically   Rocephin , Zithro   f/u procalcitonin : negative   Less likely pneumonia , will get CT chest : negative   Will dc abx

## 2019-02-22 NOTE — PROGRESS NOTE ADULT - SUBJECTIVE AND OBJECTIVE BOX
PGY2 note discussed with Primary Attending     Patient is a 63y old  Female who presents with a chief complaint of sob , generalized body swelling (22 Feb 2019 07:47)      INTERVAL HPI/ OVERNIGHT EVENTS: offers no new complaints; current symptoms resolving    MEDICATIONS  (STANDING):  azithromycin  IVPB      azithromycin  IVPB 500 milliGRAM(s) IV Intermittent every 24 hours  cefTRIAXone   IVPB 1 Gram(s) IV Intermittent every 24 hours  cefTRIAXone   IVPB      dextrose 5%. 1000 milliLiter(s) (50 mL/Hr) IV Continuous <Continuous>  dextrose 50% Injectable 12.5 Gram(s) IV Push once  dextrose 50% Injectable 25 Gram(s) IV Push once  dextrose 50% Injectable 25 Gram(s) IV Push once  folic acid 1 milliGRAM(s) Oral daily  furosemide   Injectable 40 milliGRAM(s) IV Push every 12 hours  insulin lispro (HumaLOG) corrective regimen sliding scale   SubCutaneous every 6 hours  pantoprazole    Tablet 40 milliGRAM(s) Oral before breakfast  rivaroxaban 20 milliGRAM(s) Oral every 24 hours  spironolactone 25 milliGRAM(s) Oral daily    MEDICATIONS  (PRN):  acetaminophen   Tablet .. 650 milliGRAM(s) Oral every 6 hours PRN Mild Pain (1 - 3), Moderate Pain (4 - 6)  dextrose 40% Gel 15 Gram(s) Oral once PRN Blood Glucose LESS THAN 70 milliGRAM(s)/deciliter  glucagon  Injectable 1 milliGRAM(s) IntraMuscular once PRN Glucose LESS THAN 70 milligrams/deciliter      __________________________________________________  REVIEW OF SYSTEMS: feels better     CONSTITUTIONAL: No fever,   EYES: no acute visual disturbances  NECK: No pain or stiffness  RESPIRATORY: No cough; No shortness of breath  CARDIOVASCULAR: No chest pain, no palpitations  GASTROINTESTINAL: No pain. No nausea or vomiting; No diarrhea   NEUROLOGICAL: No headache or numbness, no tremors  MUSCULOSKELETAL: No joint pain, no muscle pain  GENITOURINARY: no dysuria, no frequency, no hesitancy  PSYCHIATRY: no depression , no anxiety  ALL OTHER  ROS negative        Vital Signs Last 24 Hrs  T(C): 36.6 (22 Feb 2019 05:11), Max: 36.7 (21 Feb 2019 21:00)  T(F): 97.9 (22 Feb 2019 05:11), Max: 98 (21 Feb 2019 21:00)  HR: 75 (22 Feb 2019 05:11) (75 - 90)  BP: 100/64 (22 Feb 2019 05:11) (100/64 - 114/80)  BP(mean): --  RR: 16 (22 Feb 2019 05:11) (16 - 18)  SpO2: 100% (22 Feb 2019 05:11) (94% - 100%)    ________________________________________________  PHYSICAL EXAM:  GENERAL: NAD  HEENT: Normocephalic;  conjunctivae and sclerae clear; moist mucous membranes;   NECK : supple  CHEST/LUNG: Clear to auscultation bilaterally with good air entry   HEART: S1 S2  regular; no murmurs, gallops or rubs  ABDOMEN: Soft, Nontender, less distended than before ; Bowel sounds present  EXTREMITIES: no cyanosis; no edema; no calf tenderness  SKIN: warm and dry; no rash  NERVOUS SYSTEM:  Awake and alert; Oriented  to place, person and time ; no new deficits    _________________________________________________  LABS:                        9.2    6.05  )-----------( 280      ( 21 Feb 2019 07:44 )             30.5     02-21    141  |  108  |  20<H>  ----------------------------<  77  3.7   |  27  |  0.65    Ca    7.4<L>      21 Feb 2019 07:44          CAPILLARY BLOOD GLUCOSE      POCT Blood Glucose.: 78 mg/dL (22 Feb 2019 06:02)  POCT Blood Glucose.: 83 mg/dL (21 Feb 2019 23:46)        RADIOLOGY & ADDITIONAL TESTS:    < from: CT Abdomen and Pelvis w/ Oral Cont and w/ IV Cont (02.21.19 @ 18:03) >    IVC filter with filling defect in the suprarenal portion of the IVC   suggestive of thrombus.  Thrombus left femoral vein as noted on prior CT study.    Colonic distention with redundant sigmoid colon with abrupt transition   distally.  Findings, may reflect distal colonic obstruction; cannot exclude sigmoid   volvulus.  Differential includes a paralytic ileus related to ischemic bowel.    Theabove findings were discussed with Dr. ANN-MARIE Ross  by telephone at the   time of interpretation on 2/21/2019.    Other findings as discussed.    < end of copied text >          Imaging Personally Reviewed:  YES    Consultant(s) Notes Reviewed:   YES    Care Discussed with Consultants : YES    Plan of care was discussed with patient and /or primary care giver; all questions and concerns were addressed and care was aligned with patient's wishes.

## 2019-02-22 NOTE — PROGRESS NOTE ADULT - SUBJECTIVE AND OBJECTIVE BOX
CHIEF COMPLAINT:Patient is a 63y old  Female who presents with a chief complaint of sob , generalized body swelling.Pt appears comfortable.    	  REVIEW OF SYSTEMS:  CONSTITUTIONAL: No fever, weight loss, or fatigue  EYES: No eye pain, visual disturbances, or discharge  ENT:  No difficulty hearing, tinnitus, vertigo; No sinus or throat pain  NECK: No pain or stiffness  RESPIRATORY: No cough, wheezing, chills or hemoptysis; No Shortness of Breath  CARDIOVASCULAR: No chest pain, palpitations, passing out, dizziness, + leg swelling  GASTROINTESTINAL: No abdominal or epigastric pain. No nausea, vomiting, or hematemesis; No diarrhea or constipation. No melena or hematochezia.  GENITOURINARY: No dysuria, frequency, hematuria, or incontinence  NEUROLOGICAL: No headaches, memory loss, loss of strength, numbness, or tremors  SKIN: No itching, burning, rashes, or lesions   LYMPH Nodes: No enlarged glands  ENDOCRINE: No heat or cold intolerance; No hair loss  MUSCULOSKELETAL: No joint pain or swelling; No muscle, back, or extremity pain  PSYCHIATRIC: No depression, anxiety, mood swings, or difficulty sleeping  HEME/LYMPH: No easy bruising, or bleeding gums  ALLERGY AND IMMUNOLOGIC: No hives or eczema	      PHYSICAL EXAM:  T(C): 36.6 (02-22-19 @ 05:11), Max: 36.7 (02-21-19 @ 21:00)  HR: 75 (02-22-19 @ 05:11) (75 - 90)  BP: 100/64 (02-22-19 @ 05:11) (100/64 - 114/80)  RR: 16 (02-22-19 @ 05:11) (16 - 18)  SpO2: 100% (02-22-19 @ 05:11) (94% - 100%)  Wt(kg): --  I&O's Summary    21 Feb 2019 07:01  -  22 Feb 2019 07:00  --------------------------------------------------------  IN: 0 mL / OUT: 3400 mL / NET: -3400 mL        Appearance: Normal	  HEENT:   Normal oral mucosa, PERRL, EOMI	  Lymphatic: No lymphadenopathy  Cardiovascular: Normal S1 S2, No JVD, No murmurs, +1 edema  Respiratory: Lungs clear to auscultation	  Psychiatry: A & O x 3, Mood & affect appropriate  Gastrointestinal:  Soft, Non-tender, + BS	  Skin: No rashes, No ecchymoses, No cyanosis	  Neurologic: Non-focal  Extremities: Normal range of motion, No clubbing, cyanosis +1 edema  Vascular: Peripheral pulses palpable 2+ bilaterally    MEDICATIONS  (STANDING):  dextrose 5%. 1000 milliLiter(s) (50 mL/Hr) IV Continuous <Continuous>  dextrose 50% Injectable 12.5 Gram(s) IV Push once  dextrose 50% Injectable 25 Gram(s) IV Push once  dextrose 50% Injectable 25 Gram(s) IV Push once  folic acid 1 milliGRAM(s) Oral daily  furosemide   Injectable 40 milliGRAM(s) IV Push every 12 hours  insulin lispro (HumaLOG) corrective regimen sliding scale   SubCutaneous every 6 hours  pantoprazole    Tablet 40 milliGRAM(s) Oral before breakfast  rivaroxaban 20 milliGRAM(s) Oral every 24 hours  spironolactone 25 milliGRAM(s) Oral daily        	  LABS:	 	                        8.1    5.75  )-----------( 238      ( 22 Feb 2019 08:02 )             25.8     02-22    141  |  106  |  18  ----------------------------<  71  3.4<L>   |  30  |  0.63    Ca    7.0<L>      22 Feb 2019 08:02  Phos  2.6     02-22  Mg     1.6     02-22    TPro  4.1<L>  /  Alb  1.0<L>  /  TBili  0.4  /  DBili  x   /  AST  18  /  ALT  29  /  AlkPhos  107  02-22    proBNP: Serum Pro-Brain Natriuretic Peptide: 164 pg/mL (02-19 @ 13:04)  Serum Pro-Brain Natriuretic Peptide: 133 pg/mL (01-24 @ 18:41)    Lipid Profile: Cholesterol 53  LDL 15  HDL 29  TG 43    HgA1c: Hemoglobin A1C, Whole Blood: <4.0 % (02-20 @ 12:59)    TSH: Thyroid Stimulating Hormone, Serum: 1.97 uU/mL (02-20 @ 07:08)      	  OBSERVATIONS:  Mitral Valve: Normal mitral valve. Trace mitral  regurgitation.  Aortic Root: Normal aortic root.  Aortic Valve: Normal trileaflet aortic valve. No aortic  stenosis. No aortic valve regurgitation seen.  Left Atrium: Normal leftatrium.  LA volume index = 16  cc/m2.  Left Ventricle: Normal Left Ventricular Systolic Function,  (EF = 60 to 65% by visual estimation) Not all LV wall  segments were seen. Normal left ventricular internal  dimensions and wall thicknesses. Normal diastolic function.  Right Heart: Normal right atrium. Normal right ventricular  size and systolic function (TAPSE 1.8 cm). Normal tricuspid  valve. Trace tricuspid regurgitation. Normal pulmonic  valve. Trace pulmonic insufficiency is noted.  Pericardium/PleuraNo pericardial effusion. Left pleural  effusion.  Hemodynamic: RA Pressure is 8 mm Hg. RV systolic pressure  is mildly increased at  39 mm Hg.

## 2019-02-22 NOTE — DIETITIAN INITIAL EVALUATION ADULT. - PROBLEM SELECTOR PLAN 1
Comes in with generalized anasarca, extremities , abdomen with mild sob , otherwise comfortable appearing on exam   Likely from hypoalbuminemia , 0.9 ( 1.1 in last admission )  Could be from chronic state of immobilization, poor po intake in past due to sbo  No known heart failure, no JVD on exam , pro-bnp not so high (164)  No known liver disease, lfts WNL   No known malignancy or risk factors , outpatient screening negative for cancer   No exposure to TB/ no night sweats / weight loss   On lasix 40 bid at home   Will start on lasix 40 iv bid at home , in addition to albumin   Cain catheter to monitor uop   I&Os strict   CXR as needed   Echocardiogram to r/o hf / pulm htn / pericardial effusion ( low voltage EKG )   US abdomen  f/u TSH ( Severe hypothyroidism)  UA to r/o any proteinuria , however normal creatinine   Consider Pelvic Sono if all other tests inconclusive   May benefit from paracentesis for symptom relief in future if does not respond to lasix , would need to be on heparin drip with reversal of inr for the same , will continue with Xarelto for now

## 2019-02-22 NOTE — PROGRESS NOTE ADULT - SUBJECTIVE AND OBJECTIVE BOX
Patient examined at bedside, no complaints  No nausea, no vomiting  Denies abdominal pain  Reports passing flatus and having BMs  Underwent paracentesis yesterday        T(F): 97.9 (02-22-19 @ 05:11), Max: 98 (02-21-19 @ 21:00)  HR: 75 (02-22-19 @ 05:11) (75 - 90)  BP: 100/64 (02-22-19 @ 05:11) (100/64 - 114/80)  RR: 16 (02-22-19 @ 05:11) (16 - 18)  SpO2: 100% (02-22-19 @ 05:11) (94% - 100%)  Wt(kg): --      02-21 @ 07:01  -  02-22 @ 07:00  --------------------------------------------------------  IN:  Total IN: 0 mL    OUT:    Indwelling Catheter - Urethral: 3400 mL  Total OUT: 3400 mL    Total NET: -3400 mL        Physical Exam  General: AAOx3, No acute distress  Skin: No jaundice, no icterus  Abdomen: soft, nontender, mildly distended, no rebound tenderness, no guarding, no palpable masses  Extremities: non edematous, no calf pain bilaterally

## 2019-02-22 NOTE — PROGRESS NOTE ADULT - NEGATIVE NEUROLOGICAL SYMPTOMS
no syncope/no vertigo/no difficulty walking/no confusion/no loss of sensation/no headache/no tremors

## 2019-02-22 NOTE — PROGRESS NOTE ADULT - NEGATIVE ENMT SYMPTOMS
no throat pain/no dysphagia/no ear pain/no gum bleeding/no nose bleeds/no dry mouth/no hearing difficulty

## 2019-02-22 NOTE — PROGRESS NOTE ADULT - PROBLEM SELECTOR PLAN 3
always has radiological sign of various bowel obs but clinically not  she has very low albumin already  if clinically ok, she should be allowed to eat  ?short bowel syndrome

## 2019-02-22 NOTE — DIETITIAN INITIAL EVALUATION ADULT. - OTHER INFO
Nutrition consult requested for assessment. Patient from home lives with daughter. Visited pt. alert reports appetite fair x1wk since retaining fluids, denies nausea/vomiting or diarrhea PTA, Nutrition consult requested for assessment. Patient from home lives with daughter. Visited pt. alert reports appetite fair x1wk since retaining fluids, denies nausea/vomiting or diarrhea PTA,, stated  Lbs & gained 40 Lbs since January 2019 due fluid retention. Pt. consumed lunch today & intake >75% & tolerating, requesting nutrition education, give information & copies on Iron-rich food list with high protein food source, receptive & reinforced information given, s/p paracentesis on 2/21 & followed by surgery, d/w RN.

## 2019-02-22 NOTE — PROGRESS NOTE ADULT - ASSESSMENT
63 Female from home with PMH of Recurrent SBO's, s/p exp lap, SB resection,  DVT ( Left UE , then Left LE) , PE , chronic leg swelling , comes in with complaint of generalized body swelling and sob.   1.Heme/Onc f/u.  2.Anasarca-diuretics.  3.Ascites-s/p paracenthesis,IV Albumin,cont aldactone.,  4.DVT and PE on xarelto.  5.PPI.  6.Replace k+.

## 2019-02-22 NOTE — PROGRESS NOTE ADULT - PROBLEM SELECTOR PLAN 2
Patient had a CT yest for r/o malignancy which showed Colonic distention with redundant sigmoid colon with abrupt transition distally. Findings, may reflect distal colonic obstruction; cannot exclude sigmoid volvulus. Differential includes a paralytic ileus related to ischemic bowel.  GI consulted , recommends NPO , serial Abdo exams  Lactate minimally elevated : 2.5   Surgery following and recommends care per GI   Will get abdo xray in am Patient had a CT yest for r/o malignancy which showed Colonic distention with redundant sigmoid colon with abrupt transition distally. Findings, may reflect distal colonic obstruction; cannot exclude sigmoid volvulus. Differential includes a paralytic ileus related to ischemic bowel.  GI consulted , recommends NPO , serial Abdo exams  Lactate minimally elevated : 2.5 -->1.4   Surgery following and recommends care per GI   repeat abdo xray this am shows same colonic distension   Advanced diet per GI , Dr. Chatterjee

## 2019-02-23 DIAGNOSIS — E83.39 OTHER DISORDERS OF PHOSPHORUS METABOLISM: ICD-10-CM

## 2019-02-23 LAB
ALBUMIN SERPL ELPH-MCNC: 1.3 G/DL — LOW (ref 3.5–5)
ALP SERPL-CCNC: 95 U/L — SIGNIFICANT CHANGE UP (ref 40–120)
ALT FLD-CCNC: 27 U/L DA — SIGNIFICANT CHANGE UP (ref 10–60)
ANION GAP SERPL CALC-SCNC: 5 MMOL/L — SIGNIFICANT CHANGE UP (ref 5–17)
APTT BLD: 34.8 SEC — SIGNIFICANT CHANGE UP (ref 27.5–36.3)
AST SERPL-CCNC: 17 U/L — SIGNIFICANT CHANGE UP (ref 10–40)
BASOPHILS # BLD AUTO: 0.04 K/UL — SIGNIFICANT CHANGE UP (ref 0–0.2)
BASOPHILS # BLD AUTO: 0.05 K/UL — SIGNIFICANT CHANGE UP (ref 0–0.2)
BASOPHILS NFR BLD AUTO: 0.7 % — SIGNIFICANT CHANGE UP (ref 0–2)
BASOPHILS NFR BLD AUTO: 0.9 % — SIGNIFICANT CHANGE UP (ref 0–2)
BILIRUB SERPL-MCNC: 0.3 MG/DL — SIGNIFICANT CHANGE UP (ref 0.2–1.2)
BUN SERPL-MCNC: 19 MG/DL — HIGH (ref 7–18)
CALCIUM SERPL-MCNC: 7.3 MG/DL — LOW (ref 8.4–10.5)
CHLORIDE SERPL-SCNC: 107 MMOL/L — SIGNIFICANT CHANGE UP (ref 96–108)
CO2 SERPL-SCNC: 30 MMOL/L — SIGNIFICANT CHANGE UP (ref 22–31)
CREAT SERPL-MCNC: 0.68 MG/DL — SIGNIFICANT CHANGE UP (ref 0.5–1.3)
EOSINOPHIL # BLD AUTO: 0.05 K/UL — SIGNIFICANT CHANGE UP (ref 0–0.5)
EOSINOPHIL # BLD AUTO: 0.06 K/UL — SIGNIFICANT CHANGE UP (ref 0–0.5)
EOSINOPHIL NFR BLD AUTO: 0.8 % — SIGNIFICANT CHANGE UP (ref 0–6)
EOSINOPHIL NFR BLD AUTO: 1.1 % — SIGNIFICANT CHANGE UP (ref 0–6)
GLUCOSE BLDC GLUCOMTR-MCNC: 147 MG/DL — HIGH (ref 70–99)
GLUCOSE BLDC GLUCOMTR-MCNC: 86 MG/DL — SIGNIFICANT CHANGE UP (ref 70–99)
GLUCOSE BLDC GLUCOMTR-MCNC: 95 MG/DL — SIGNIFICANT CHANGE UP (ref 70–99)
GLUCOSE SERPL-MCNC: 108 MG/DL — HIGH (ref 70–99)
HCT VFR BLD CALC: 23.4 % — LOW (ref 34.5–45)
HCT VFR BLD CALC: 24.3 % — LOW (ref 34.5–45)
HGB BLD-MCNC: 7.1 G/DL — LOW (ref 11.5–15.5)
HGB BLD-MCNC: 7.3 G/DL — LOW (ref 11.5–15.5)
IMM GRANULOCYTES NFR BLD AUTO: 0.2 % — SIGNIFICANT CHANGE UP (ref 0–1.5)
IMM GRANULOCYTES NFR BLD AUTO: 0.3 % — SIGNIFICANT CHANGE UP (ref 0–1.5)
INR BLD: 1.51 RATIO — HIGH (ref 0.88–1.16)
LYMPHOCYTES # BLD AUTO: 1.32 K/UL — SIGNIFICANT CHANGE UP (ref 1–3.3)
LYMPHOCYTES # BLD AUTO: 1.33 K/UL — SIGNIFICANT CHANGE UP (ref 1–3.3)
LYMPHOCYTES # BLD AUTO: 22.4 % — SIGNIFICANT CHANGE UP (ref 13–44)
LYMPHOCYTES # BLD AUTO: 23.7 % — SIGNIFICANT CHANGE UP (ref 13–44)
MAGNESIUM SERPL-MCNC: 1.7 MG/DL — SIGNIFICANT CHANGE UP (ref 1.6–2.6)
MCHC RBC-ENTMCNC: 28.1 PG — SIGNIFICANT CHANGE UP (ref 27–34)
MCHC RBC-ENTMCNC: 28.3 PG — SIGNIFICANT CHANGE UP (ref 27–34)
MCHC RBC-ENTMCNC: 30 GM/DL — LOW (ref 32–36)
MCHC RBC-ENTMCNC: 30.3 GM/DL — LOW (ref 32–36)
MCV RBC AUTO: 93.2 FL — SIGNIFICANT CHANGE UP (ref 80–100)
MCV RBC AUTO: 93.5 FL — SIGNIFICANT CHANGE UP (ref 80–100)
MONOCYTES # BLD AUTO: 0.48 K/UL — SIGNIFICANT CHANGE UP (ref 0–0.9)
MONOCYTES # BLD AUTO: 0.54 K/UL — SIGNIFICANT CHANGE UP (ref 0–0.9)
MONOCYTES NFR BLD AUTO: 8.6 % — SIGNIFICANT CHANGE UP (ref 2–14)
MONOCYTES NFR BLD AUTO: 9.2 % — SIGNIFICANT CHANGE UP (ref 2–14)
NEUTROPHILS # BLD AUTO: 3.68 K/UL — SIGNIFICANT CHANGE UP (ref 1.8–7.4)
NEUTROPHILS # BLD AUTO: 3.92 K/UL — SIGNIFICANT CHANGE UP (ref 1.8–7.4)
NEUTROPHILS NFR BLD AUTO: 65.5 % — SIGNIFICANT CHANGE UP (ref 43–77)
NEUTROPHILS NFR BLD AUTO: 66.6 % — SIGNIFICANT CHANGE UP (ref 43–77)
NRBC # BLD: 0 /100 WBCS — SIGNIFICANT CHANGE UP (ref 0–0)
NRBC # BLD: 0 /100 WBCS — SIGNIFICANT CHANGE UP (ref 0–0)
OB PNL STL: NEGATIVE — SIGNIFICANT CHANGE UP
PHOSPHATE SERPL-MCNC: 1.9 MG/DL — LOW (ref 2.5–4.5)
PLATELET # BLD AUTO: 227 K/UL — SIGNIFICANT CHANGE UP (ref 150–400)
PLATELET # BLD AUTO: 233 K/UL — SIGNIFICANT CHANGE UP (ref 150–400)
POTASSIUM SERPL-MCNC: 3.8 MMOL/L — SIGNIFICANT CHANGE UP (ref 3.5–5.3)
POTASSIUM SERPL-SCNC: 3.8 MMOL/L — SIGNIFICANT CHANGE UP (ref 3.5–5.3)
PROT SERPL-MCNC: 4.2 G/DL — LOW (ref 6–8.3)
PROTHROM AB SERPL-ACNC: 17 SEC — HIGH (ref 10–12.9)
RBC # BLD: 2.51 M/UL — LOW (ref 3.8–5.2)
RBC # BLD: 2.6 M/UL — LOW (ref 3.8–5.2)
RBC # FLD: 24.4 % — HIGH (ref 10.3–14.5)
RBC # FLD: 24.6 % — HIGH (ref 10.3–14.5)
SODIUM SERPL-SCNC: 142 MMOL/L — SIGNIFICANT CHANGE UP (ref 135–145)
WBC # BLD: 5.61 K/UL — SIGNIFICANT CHANGE UP (ref 3.8–10.5)
WBC # BLD: 5.89 K/UL — SIGNIFICANT CHANGE UP (ref 3.8–10.5)
WBC # FLD AUTO: 5.61 K/UL — SIGNIFICANT CHANGE UP (ref 3.8–10.5)
WBC # FLD AUTO: 5.89 K/UL — SIGNIFICANT CHANGE UP (ref 3.8–10.5)

## 2019-02-23 RX ORDER — POTASSIUM PHOSPHATE, MONOBASIC POTASSIUM PHOSPHATE, DIBASIC 236; 224 MG/ML; MG/ML
15 INJECTION, SOLUTION INTRAVENOUS ONCE
Qty: 0 | Refills: 0 | Status: COMPLETED | OUTPATIENT
Start: 2019-02-23 | End: 2019-02-23

## 2019-02-23 RX ADMIN — Medication 20 MILLIGRAM(S): at 17:04

## 2019-02-23 RX ADMIN — Medication 20 MILLIGRAM(S): at 05:45

## 2019-02-23 RX ADMIN — PANTOPRAZOLE SODIUM 40 MILLIGRAM(S): 20 TABLET, DELAYED RELEASE ORAL at 05:45

## 2019-02-23 RX ADMIN — POTASSIUM PHOSPHATE, MONOBASIC POTASSIUM PHOSPHATE, DIBASIC 62.5 MILLIMOLE(S): 236; 224 INJECTION, SOLUTION INTRAVENOUS at 10:30

## 2019-02-23 RX ADMIN — Medication 1 MILLIGRAM(S): at 12:01

## 2019-02-23 RX ADMIN — SPIRONOLACTONE 25 MILLIGRAM(S): 25 TABLET, FILM COATED ORAL at 05:45

## 2019-02-23 RX ADMIN — RIVAROXABAN 20 MILLIGRAM(S): KIT at 17:03

## 2019-02-23 NOTE — PROGRESS NOTE ADULT - PROBLEM SELECTOR PLAN 2
Patient had a CT yest for r/o malignancy which showed Colonic distention with redundant sigmoid colon with abrupt transition distally. Findings, may reflect distal colonic obstruction; cannot exclude sigmoid volvulus. Differential includes a paralytic ileus related to ischemic bowel.  GI consulted , recommends NPO , serial Abdo exams  Lactate minimally elevated : 2.5 -->1.4   Surgery following and recommends care per GI   repeat abdo xray this am shows same colonic distension   Advanced diet per GI , Dr. Chatterjee

## 2019-02-23 NOTE — PROGRESS NOTE ADULT - SUBJECTIVE AND OBJECTIVE BOX
Patient was seen and examined  Patient is a 63y old  Female who presents with a chief complaint of sob , generalized body swelling (22 Feb 2019 18:50)      INTERVAL HPI/OVERNIGHT EVENTS:  T(C): 36.8 (02-23-19 @ 05:17), Max: 36.8 (02-23-19 @ 05:17)  HR: 90 (02-23-19 @ 05:17) (81 - 90)  BP: 115/61 (02-23-19 @ 05:17) (98/59 - 115/61)  RR: 17 (02-23-19 @ 05:17) (17 - 18)  SpO2: 98% (02-23-19 @ 05:17) (97% - 98%)  Wt(kg): --  I&O's Summary    21 Feb 2019 07:01  -  22 Feb 2019 07:00  --------------------------------------------------------  IN: 0 mL / OUT: 3400 mL / NET: -3400 mL    22 Feb 2019 07:01  -  23 Feb 2019 06:14  --------------------------------------------------------  IN: 0 mL / OUT: 1800 mL / NET: -1800 mL        LABS:                        8.1    5.75  )-----------( 238      ( 22 Feb 2019 08:02 )             25.8     02-22    141  |  106  |  18  ----------------------------<  71  3.4<L>   |  30  |  0.63    Ca    7.0<L>      22 Feb 2019 08:02  Phos  2.6     02-22  Mg     1.6     02-22    TPro  4.1<L>  /  Alb  1.0<L>  /  TBili  0.4  /  DBili  x   /  AST  18  /  ALT  29  /  AlkPhos  107  02-22        CAPILLARY BLOOD GLUCOSE      POCT Blood Glucose.: 128 mg/dL (22 Feb 2019 23:44)  POCT Blood Glucose.: 133 mg/dL (22 Feb 2019 18:15)  POCT Blood Glucose.: 74 mg/dL (22 Feb 2019 11:18)    LIPID PANEL  Cholesterol 53  LDL 15  HDL 29  RATIO HDL/Total Cholesterol --  Triglyceride 43            MEDICATIONS  (STANDING):  dextrose 5%. 1000 milliLiter(s) (50 mL/Hr) IV Continuous <Continuous>  dextrose 50% Injectable 12.5 Gram(s) IV Push once  dextrose 50% Injectable 25 Gram(s) IV Push once  dextrose 50% Injectable 25 Gram(s) IV Push once  folic acid 1 milliGRAM(s) Oral daily  furosemide   Injectable 20 milliGRAM(s) IV Push two times a day  insulin lispro (HumaLOG) corrective regimen sliding scale   SubCutaneous every 6 hours  pantoprazole    Tablet 40 milliGRAM(s) Oral before breakfast  rivaroxaban 20 milliGRAM(s) Oral every 24 hours  spironolactone 25 milliGRAM(s) Oral daily    MEDICATIONS  (PRN):  acetaminophen   Tablet .. 650 milliGRAM(s) Oral every 6 hours PRN Mild Pain (1 - 3), Moderate Pain (4 - 6)  dextrose 40% Gel 15 Gram(s) Oral once PRN Blood Glucose LESS THAN 70 milliGRAM(s)/deciliter  glucagon  Injectable 1 milliGRAM(s) IntraMuscular once PRN Glucose LESS THAN 70 milligrams/deciliter      RADIOLOGY & ADDITIONAL TESTS:    Imaging Personally Reviewed:  [ ] YES  [ ] NO    REVIEW OF SYSTEMS:  CONSTITUTIONAL: No fever, weight loss, or fatigue  EYES: No eye pain, visual disturbances, or discharge  ENMT:  No difficulty hearing, tinnitus, vertigo; No sinus or throat pain  NECK: No pain or stiffness  BREASTS: No pain, masses, or nipple discharge  RESPIRATORY: No cough, wheezing, chills or hemoptysis; No shortness of breath  CARDIOVASCULAR: No chest pain, palpitations, dizziness, or leg swelling  GASTROINTESTINAL: No abdominal or epigastric pain. No nausea, vomiting, or hematemesis; No diarrhea or constipation. No melena or hematochezia.  GENITOURINARY: No dysuria, frequency, hematuria, or incontinence  NEUROLOGICAL: No headaches, memory loss, loss of strength, numbness, or tremors  SKIN: No itching, burning, rashes, or lesions   LYMPH NODES: No enlarged glands  ENDOCRINE: No heat or cold intolerance; No hair loss  MUSCULOSKELETAL: No joint pain or swelling; No muscle, back, or extremity pain  PSYCHIATRIC: No depression, anxiety, mood swings, or difficulty sleeping  HEME/LYMPH: No easy bruising, or bleeding gums  ALLERY AND IMMUNOLOGIC: No hives or eczema      Consultant(s) Notes Reviewed:  [ x ] YES  [ ] NO    PHYSICAL EXAM:  GENERAL: NAD, well-groomed, well-developed  HEAD:  Atraumatic, Normocephalic  EYES: EOMI, PERRLA, conjunctiva and sclera clear  ENMT: No tonsillar erythema, exudates, or enlargement; Moist mucous membranes, Good dentition, No lesions  NECK: Supple, No JVD, Normal thyroid  NERVOUS SYSTEM:  Alert & Oriented X3, Good concentration; Motor Strength 5/5 B/L upper and lower extremities; DTRs 2+ intact and symmetric  CHEST/LUNG: Clear to percussion bilaterally; No rales, rhonchi, wheezing, or rubs  HEART: Regular rate and rhythm; No murmurs, rubs, or gallops  ABDOMEN: Soft, Nontender, Nondistended; Bowel sounds present  EXTREMITIES:  2+ Peripheral Pulses, No clubbing, cyanosis, or edema  LYMPH: No lymphadenopathy noted  SKIN: No rashes or lesions    Care Discussed with Consultants/Other Providers [ x] YES  [ ] NO

## 2019-02-23 NOTE — PROGRESS NOTE ADULT - PROBLEM SELECTOR PLAN 3
h/o RINKU on oral iron   Has occasional hemorrhoidal bleeding   No bleeding in last 24 hours   Last EGD / Colonoscopy : Esophagitis , gastritis , diverticulosis (2018)   hb today 7.1 , no signs of active bleeding   Will do occult stool exam  Repeat cbc , pt/inr / abo type and cross   Transfuse to keep hb >7 h/o RINKU on oral iron ( iron studies show Anemia in chronic dz)  Has occasional hemorrhoidal bleeding   No bleeding in last 24 hours   Last EGD / Colonoscopy : Esophagitis , gastritis , diverticulosis (2018)   hb today 7.1 , no signs of active bleeding   Will do occult stool exam  Repeat cbc , pt/inr / abo type and cross   Transfuse to keep hb >7    ** repeat hb is 7.3 , occult negative , normal rectal exam ; will monitor h/h for now   Patient was concerned about blood transfusion, and will consider only if absolutely needed , hence no consent in chart

## 2019-02-23 NOTE — PROGRESS NOTE ADULT - PROBLEM SELECTOR PLAN 1
Comes in with generalized anasarca, extremities , abdomen with mild sob , otherwise comfortable appearing on exam   Likely from hypoalbuminemia  Could be from chronic state of immobilization, poor po intake in past due to sbo  No known heart failure, no JVD on exam , pro-bnp not so high (164) ; pending ECHO :normal   No known liver disease, lfts WNL   No known malignancy or risk factors , outpatient screening negative for cancer ; however CEA elevated ; CT abdomen shows no mass   No exposure to TB/ no night sweats / weight loss   On lasix 40 bid at home   lasix tapered to 20 iv bid  Cain catheter to monitor uop   I&Os strict    f/u TSH wnl  UA negative for proteinuria    s/p paracentesis

## 2019-02-23 NOTE — PROGRESS NOTE ADULT - SUBJECTIVE AND OBJECTIVE BOX
CHIEF COMPLAINT:Patient is a 63y old  Female who presents with a chief complaint of sob , generalized body swelling.Pt appears comfortable.    	  REVIEW OF SYSTEMS:  CONSTITUTIONAL: No fever, weight loss, or fatigue  EYES: No eye pain, visual disturbances, or discharge  ENT:  No difficulty hearing, tinnitus, vertigo; No sinus or throat pain  NECK: No pain or stiffness  RESPIRATORY: No cough, wheezing, chills or hemoptysis; No Shortness of Breath  CARDIOVASCULAR: No chest pain, palpitations, passing out, dizziness, or leg swelling  GASTROINTESTINAL: No abdominal or epigastric pain. No nausea, vomiting, or hematemesis; No diarrhea or constipation. No melena or hematochezia.  GENITOURINARY: No dysuria, frequency, hematuria, or incontinence  NEUROLOGICAL: No headaches, memory loss, loss of strength, numbness, or tremors  SKIN: No itching, burning, rashes, or lesions   LYMPH Nodes: No enlarged glands  ENDOCRINE: No heat or cold intolerance; No hair loss  MUSCULOSKELETAL: No joint pain or swelling; No muscle, back, or extremity pain  PSYCHIATRIC: No depression, anxiety, mood swings, or difficulty sleeping  HEME/LYMPH: No easy bruising, or bleeding gums  ALLERGY AND IMMUNOLOGIC: No hives or eczema	      PHYSICAL EXAM:  T(C): 36.8 (02-23-19 @ 05:17), Max: 36.8 (02-23-19 @ 05:17)  HR: 90 (02-23-19 @ 05:17) (81 - 90)  BP: 115/61 (02-23-19 @ 05:17) (98/59 - 115/61)  RR: 17 (02-23-19 @ 05:17) (17 - 18)  SpO2: 98% (02-23-19 @ 05:17) (97% - 98%)  Wt(kg): --  I&O's Summary    22 Feb 2019 07:01  -  23 Feb 2019 07:00  --------------------------------------------------------  IN: 0 mL / OUT: 2350 mL / NET: -2350 mL        Appearance: Normal	  HEENT:   Normal oral mucosa, PERRL, EOMI	  Lymphatic: No lymphadenopathy  Cardiovascular: Normal S1 S2, No JVD, No murmurs, +1 edema  Respiratory: Lungs clear to auscultation	  Psychiatry: A & O x 3, Mood & affect appropriate  Gastrointestinal:  Soft, Non-tender, + BS	  Skin: No rashes, No ecchymoses, No cyanosis	  Neurologic: Non-focal  Extremities: Normal range of motion, No clubbing, cyanosis +1 edema  Vascular: Peripheral pulses palpable 2+ bilaterally    MEDICATIONS  (STANDING):  dextrose 5%. 1000 milliLiter(s) (50 mL/Hr) IV Continuous <Continuous>  dextrose 50% Injectable 12.5 Gram(s) IV Push once  dextrose 50% Injectable 25 Gram(s) IV Push once  dextrose 50% Injectable 25 Gram(s) IV Push once  folic acid 1 milliGRAM(s) Oral daily  furosemide   Injectable 20 milliGRAM(s) IV Push two times a day  insulin lispro (HumaLOG) corrective regimen sliding scale   SubCutaneous every 6 hours  pantoprazole    Tablet 40 milliGRAM(s) Oral before breakfast  rivaroxaban 20 milliGRAM(s) Oral every 24 hours  spironolactone 25 milliGRAM(s) Oral daily        	  LABS:	 	                        7.3    5.89  )-----------( 233      ( 23 Feb 2019 10:49 )             24.3     02-23    142  |  107  |  19<H>  ----------------------------<  108<H>  3.8   |  30  |  0.68    Ca    7.3<L>      23 Feb 2019 07:05  Phos  1.9     02-23  Mg     1.7     02-23    TPro  4.2<L>  /  Alb  1.3<L>  /  TBili  0.3  /  DBili  x   /  AST  17  /  ALT  27  /  AlkPhos  95  02-23    proBNP: Serum Pro-Brain Natriuretic Peptide: 164 pg/mL (02-19 @ 13:04)  Serum Pro-Brain Natriuretic Peptide: 133 pg/mL (01-24 @ 18:41)    Lipid Profile: Cholesterol 53  LDL 15  HDL 29  TG 43    HgA1c: Hemoglobin A1C, Whole Blood: <4.0 % (02-20 @ 12:59)    TSH: Thyroid Stimulating Hormone, Serum: 1.97 uU/mL (02-20 @ 07:08)

## 2019-02-23 NOTE — PROGRESS NOTE ADULT - SUBJECTIVE AND OBJECTIVE BOX
Patient examined at bedside, no complaints.   No nausea, no vomiting  Tolerating diet  Passing flatus and moving her bowels  GI notes appreciated        T(F): 98.1 (02-23-19 @ 13:42), Max: 98.3 (02-23-19 @ 05:17)  HR: 93 (02-23-19 @ 13:42) (81 - 93)  BP: 108/74 (02-23-19 @ 13:42) (100/66 - 115/61)  RR: 18 (02-23-19 @ 13:42) (17 - 18)  SpO2: 99% (02-23-19 @ 13:42) (97% - 99%)  Wt(kg): --      02-22 @ 07:01  -  02-23 @ 07:00  --------------------------------------------------------  IN:  Total IN: 0 mL    OUT:    Indwelling Catheter - Urethral: 2350 mL  Total OUT: 2350 mL    Total NET: -2350 mL        Physical Exam  General: AAOx3, No acute distress  Skin: No jaundice, no icterus  Abdomen: soft, nontender, distended, not tympanic, no rebound tenderness, no guarding, no palpable masses  Extremities: non edematous, no calf pain bilaterally

## 2019-02-23 NOTE — PROGRESS NOTE ADULT - ASSESSMENT
63 Female from home with PMH of Recurrent SBO's, s/p exp lap, SB resection,  DVT ( Left UE , then Left LE) , PE , chronic leg swelling , comes in with complaint of generalized body swelling and sob.   1.Heme/Onc f/u.  2.Anasarca-diuretics.  3.Ascites-s/p paracenthesis,cont aldactone.,  4.DVT and PE on xarelto.  5.PPI.

## 2019-02-23 NOTE — PROGRESS NOTE ADULT - PROBLEM SELECTOR PLAN 1
Comes in with generalized anasarca, extremities , abdomen with mild sob , otherwise comfortable appearing on exam   Likely from hypoalbuminemia  Could be from chronic state of immobilization, poor po intake in past due to sbo  No known heart failure, no JVD on exam , pro-bnp not so high (164) ; pending ECHO :normal   No known liver disease, lfts WNL   No known malignancy or risk factors , outpatient screening negative for cancer ; however CEA elevated ; CT abdomen shows no mass   No exposure to TB/ no night sweats / weight loss   On lasix 40 bid at home   lasix tapered to 20 iv bid  Cain catheter to monitor uop   I&Os strict    f/u TSH wnl  UA negative for proteinuria    s/p paracentesis ; f/u fluid studies Comes in with generalized anasarca, extremities , abdomen with mild sob , otherwise comfortable appearing on exam   Likely from hypoalbuminemia  Could be from chronic state of immobilization, poor po intake in past due to sbo  No known heart failure, no JVD on exam , pro-bnp not so high (164) ; pending ECHO :normal   No known liver disease, lfts WNL   No known malignancy or risk factors , outpatient screening negative for cancer ; however CEA elevated ; CT abdomen shows no mass   No exposure to TB/ no night sweats / weight loss   On lasix 40 bid at home   lasix tapered to 20 iv bid  Cain catheter to monitor uop   I&Os strict    f/u TSH wnl  UA negative for proteinuria    s/p paracentesis ; f/u fluid studies  SAAG 0.8 ( <1 ) with total protein <2.5 , consistent with tuberculous/ nephrotic  Ua negative for protein Comes in with generalized anasarca, extremities , abdomen with mild sob , otherwise comfortable appearing on exam   Likely from hypoalbuminemia  Could be from chronic state of immobilization, poor po intake in past due to sbo  No known heart failure, no JVD on exam , pro-bnp not so high (164) ; pending ECHO :normal   No known liver disease, lfts WNL   No known malignancy or risk factors , outpatient screening negative for cancer ; however CEA elevated ; CT abdomen shows no mass   No exposure to TB/ no night sweats / weight loss   On lasix 40 bid at home   lasix tapered to 20 iv bid  Cain catheter to monitor uop   I&Os strict    f/u TSH wnl  UA negative for proteinuria    s/p paracentesis ; f/u fluid studies  SAAG 0.8 ( <1 ) with total protein <2.5 , consistent with tuberculous/ nephrotic  Ua negative for protein , less likely nephrotic   Will obtain QuantiFeron gold   f/u fluid cytology

## 2019-02-23 NOTE — PROGRESS NOTE ADULT - SUBJECTIVE AND OBJECTIVE BOX
PGY2 note discussed with Primary Attending     Patient is a 63y old  Female who presents with a chief complaint of sob , generalized body swelling (23 Feb 2019 06:13)      INTERVAL HPI/OVERNIGHT EVENTS: offers no new complaints; current symptoms resolving    MEDICATIONS  (STANDING):  dextrose 5%. 1000 milliLiter(s) (50 mL/Hr) IV Continuous <Continuous>  dextrose 50% Injectable 12.5 Gram(s) IV Push once  dextrose 50% Injectable 25 Gram(s) IV Push once  dextrose 50% Injectable 25 Gram(s) IV Push once  folic acid 1 milliGRAM(s) Oral daily  furosemide   Injectable 20 milliGRAM(s) IV Push two times a day  insulin lispro (HumaLOG) corrective regimen sliding scale   SubCutaneous every 6 hours  pantoprazole    Tablet 40 milliGRAM(s) Oral before breakfast  potassium phosphate IVPB 15 milliMole(s) IV Intermittent once  rivaroxaban 20 milliGRAM(s) Oral every 24 hours  spironolactone 25 milliGRAM(s) Oral daily    MEDICATIONS  (PRN):  acetaminophen   Tablet .. 650 milliGRAM(s) Oral every 6 hours PRN Mild Pain (1 - 3), Moderate Pain (4 - 6)  dextrose 40% Gel 15 Gram(s) Oral once PRN Blood Glucose LESS THAN 70 milliGRAM(s)/deciliter  glucagon  Injectable 1 milliGRAM(s) IntraMuscular once PRN Glucose LESS THAN 70 milligrams/deciliter      __________________________________________________  REVIEW OF SYSTEMS:    CONSTITUTIONAL: No fever,   EYES: no acute visual disturbances  NECK: No pain or stiffness  RESPIRATORY: No cough; No shortness of breath  CARDIOVASCULAR: No chest pain, no palpitations  GASTROINTESTINAL: No pain. No nausea or vomiting; No diarrhea   NEUROLOGICAL: No headache or numbness, no tremors  MUSCULOSKELETAL: No joint pain, no muscle pain  GENITOURINARY: no dysuria, no frequency, no hesitancy  PSYCHIATRY: no depression , no anxiety  ALL OTHER  ROS negative        Vital Signs Last 24 Hrs  T(C): 36.8 (23 Feb 2019 05:17), Max: 36.8 (23 Feb 2019 05:17)  T(F): 98.3 (23 Feb 2019 05:17), Max: 98.3 (23 Feb 2019 05:17)  HR: 90 (23 Feb 2019 05:17) (81 - 90)  BP: 115/61 (23 Feb 2019 05:17) (98/59 - 115/61)  BP(mean): --  RR: 17 (23 Feb 2019 05:17) (17 - 18)  SpO2: 98% (23 Feb 2019 05:17) (97% - 98%)    ________________________________________________  PHYSICAL EXAM:  GENERAL: NAD  HEENT: Normocephalic;  conjunctivae and sclerae clear; moist mucous membranes;   NECK : supple  CHEST/LUNG: Clear to auscultation bilaterally with good air entry   HEART: S1 S2  regular; no murmurs, gallops or rubs  ABDOMEN: Soft, Nontender, mild distended; Bowel sounds present  EXTREMITIES: no cyanosis; no edema; no calf tenderness  SKIN: warm and dry; no rash  NERVOUS SYSTEM:  Awake and alert; Oriented  to place, person and time ; no new deficits    _________________________________________________  LABS:                        7.1    5.61  )-----------( 227      ( 23 Feb 2019 07:05 )             23.4     02-23    142  |  107  |  19<H>  ----------------------------<  108<H>  3.8   |  30  |  0.68    Ca    7.3<L>      23 Feb 2019 07:05  Phos  1.9     02-23  Mg     1.7     02-23    TPro  4.2<L>  /  Alb  1.3<L>  /  TBili  0.3  /  DBili  x   /  AST  17  /  ALT  27  /  AlkPhos  95  02-23        CAPILLARY BLOOD GLUCOSE      POCT Blood Glucose.: 95 mg/dL (23 Feb 2019 06:04)  POCT Blood Glucose.: 128 mg/dL (22 Feb 2019 23:44)  POCT Blood Glucose.: 133 mg/dL (22 Feb 2019 18:15)  POCT Blood Glucose.: 74 mg/dL (22 Feb 2019 11:18)        Consultant(s) Notes Reviewed:   YES    Care Discussed with Consultants : YES    Plan of care was discussed with patient and /or primary care giver; all questions and concerns were addressed and care was aligned with patient's wishes.

## 2019-02-23 NOTE — PROGRESS NOTE ADULT - ASSESSMENT
63 Female from home with PMH of Recurrent SBO's, s/p exp lap, SB resection,  DVT ( Left UE , then Left LE) , PE , chronic leg swelling , comes in with complaint of generalized body swelling and sob.   In Ed , BP: 119/77 mm hg , Hr: 105 , Temp : 98.1 F  Cbc   Cmp shows elevated bun , albumin of 0.9   T1 negative   CXR shows Left sided pleural effusion with likely left cardiac opacity.    Will admit to medicine for Anasarca.   DC TREJO MONITOR URINE OUT PUT

## 2019-02-23 NOTE — PROGRESS NOTE ADULT - ASSESSMENT
64 y/o female with DVT & PE & colonic distention on CT but clinically no signs of LBO      1. Diet as tolerated  2. Plan per medical team and GI  3. No surgical intervention  4. Discussed with Dr. Guerrero & he agrees

## 2019-02-24 LAB
ALBUMIN SERPL ELPH-MCNC: 1.3 G/DL — LOW (ref 3.5–5)
ALP SERPL-CCNC: 78 U/L — SIGNIFICANT CHANGE UP (ref 40–120)
ALT FLD-CCNC: 25 U/L DA — SIGNIFICANT CHANGE UP (ref 10–60)
ANION GAP SERPL CALC-SCNC: 5 MMOL/L — SIGNIFICANT CHANGE UP (ref 5–17)
AST SERPL-CCNC: 18 U/L — SIGNIFICANT CHANGE UP (ref 10–40)
BASOPHILS # BLD AUTO: 0.06 K/UL — SIGNIFICANT CHANGE UP (ref 0–0.2)
BASOPHILS NFR BLD AUTO: 1.1 % — SIGNIFICANT CHANGE UP (ref 0–2)
BILIRUB SERPL-MCNC: 0.3 MG/DL — SIGNIFICANT CHANGE UP (ref 0.2–1.2)
BUN SERPL-MCNC: 17 MG/DL — SIGNIFICANT CHANGE UP (ref 7–18)
CALCIUM SERPL-MCNC: 7 MG/DL — LOW (ref 8.4–10.5)
CHLORIDE SERPL-SCNC: 107 MMOL/L — SIGNIFICANT CHANGE UP (ref 96–108)
CO2 SERPL-SCNC: 30 MMOL/L — SIGNIFICANT CHANGE UP (ref 22–31)
CREAT SERPL-MCNC: 0.64 MG/DL — SIGNIFICANT CHANGE UP (ref 0.5–1.3)
EOSINOPHIL # BLD AUTO: 0.07 K/UL — SIGNIFICANT CHANGE UP (ref 0–0.5)
EOSINOPHIL NFR BLD AUTO: 1.2 % — SIGNIFICANT CHANGE UP (ref 0–6)
GLUCOSE BLDC GLUCOMTR-MCNC: 174 MG/DL — HIGH (ref 70–99)
GLUCOSE BLDC GLUCOMTR-MCNC: 82 MG/DL — SIGNIFICANT CHANGE UP (ref 70–99)
GLUCOSE BLDC GLUCOMTR-MCNC: 91 MG/DL — SIGNIFICANT CHANGE UP (ref 70–99)
GLUCOSE BLDC GLUCOMTR-MCNC: 94 MG/DL — SIGNIFICANT CHANGE UP (ref 70–99)
GLUCOSE BLDC GLUCOMTR-MCNC: 98 MG/DL — SIGNIFICANT CHANGE UP (ref 70–99)
GLUCOSE SERPL-MCNC: 71 MG/DL — SIGNIFICANT CHANGE UP (ref 70–99)
HCT VFR BLD CALC: 22.8 % — LOW (ref 34.5–45)
HGB BLD-MCNC: 6.9 G/DL — CRITICAL LOW (ref 11.5–15.5)
IMM GRANULOCYTES NFR BLD AUTO: 0.9 % — SIGNIFICANT CHANGE UP (ref 0–1.5)
LYMPHOCYTES # BLD AUTO: 1.63 K/UL — SIGNIFICANT CHANGE UP (ref 1–3.3)
LYMPHOCYTES # BLD AUTO: 28.6 % — SIGNIFICANT CHANGE UP (ref 13–44)
MAGNESIUM SERPL-MCNC: 1.7 MG/DL — SIGNIFICANT CHANGE UP (ref 1.6–2.6)
MCHC RBC-ENTMCNC: 28.5 PG — SIGNIFICANT CHANGE UP (ref 27–34)
MCHC RBC-ENTMCNC: 30.3 GM/DL — LOW (ref 32–36)
MCV RBC AUTO: 94.2 FL — SIGNIFICANT CHANGE UP (ref 80–100)
MONOCYTES # BLD AUTO: 0.52 K/UL — SIGNIFICANT CHANGE UP (ref 0–0.9)
MONOCYTES NFR BLD AUTO: 9.1 % — SIGNIFICANT CHANGE UP (ref 2–14)
NEUTROPHILS # BLD AUTO: 3.37 K/UL — SIGNIFICANT CHANGE UP (ref 1.8–7.4)
NEUTROPHILS NFR BLD AUTO: 59.1 % — SIGNIFICANT CHANGE UP (ref 43–77)
NRBC # BLD: 0 /100 WBCS — SIGNIFICANT CHANGE UP (ref 0–0)
PHOSPHATE SERPL-MCNC: 2.3 MG/DL — LOW (ref 2.5–4.5)
PLATELET # BLD AUTO: 224 K/UL — SIGNIFICANT CHANGE UP (ref 150–400)
POTASSIUM SERPL-MCNC: 3.9 MMOL/L — SIGNIFICANT CHANGE UP (ref 3.5–5.3)
POTASSIUM SERPL-SCNC: 3.9 MMOL/L — SIGNIFICANT CHANGE UP (ref 3.5–5.3)
PROT SERPL-MCNC: 3.9 G/DL — LOW (ref 6–8.3)
RBC # BLD: 2.42 M/UL — LOW (ref 3.8–5.2)
RBC # FLD: 24.5 % — HIGH (ref 10.3–14.5)
SODIUM SERPL-SCNC: 142 MMOL/L — SIGNIFICANT CHANGE UP (ref 135–145)
WBC # BLD: 5.7 K/UL — SIGNIFICANT CHANGE UP (ref 3.8–10.5)
WBC # FLD AUTO: 5.7 K/UL — SIGNIFICANT CHANGE UP (ref 3.8–10.5)

## 2019-02-24 RX ORDER — POTASSIUM PHOSPHATE, MONOBASIC POTASSIUM PHOSPHATE, DIBASIC 236; 224 MG/ML; MG/ML
15 INJECTION, SOLUTION INTRAVENOUS ONCE
Qty: 0 | Refills: 0 | Status: COMPLETED | OUTPATIENT
Start: 2019-02-24 | End: 2019-02-24

## 2019-02-24 RX ORDER — SODIUM,POTASSIUM PHOSPHATES 278-250MG
1 POWDER IN PACKET (EA) ORAL
Qty: 0 | Refills: 0 | Status: COMPLETED | OUTPATIENT
Start: 2019-02-24 | End: 2019-02-25

## 2019-02-24 RX ADMIN — Medication 1 TABLET(S): at 22:00

## 2019-02-24 RX ADMIN — Medication 1 MILLIGRAM(S): at 11:10

## 2019-02-24 RX ADMIN — POTASSIUM PHOSPHATE, MONOBASIC POTASSIUM PHOSPHATE, DIBASIC 62.5 MILLIMOLE(S): 236; 224 INJECTION, SOLUTION INTRAVENOUS at 15:14

## 2019-02-24 RX ADMIN — Medication 20 MILLIGRAM(S): at 17:20

## 2019-02-24 RX ADMIN — Medication 1 TABLET(S): at 17:19

## 2019-02-24 RX ADMIN — PANTOPRAZOLE SODIUM 40 MILLIGRAM(S): 20 TABLET, DELAYED RELEASE ORAL at 05:40

## 2019-02-24 RX ADMIN — Medication 1 TABLET(S): at 11:31

## 2019-02-24 RX ADMIN — RIVAROXABAN 20 MILLIGRAM(S): KIT at 17:19

## 2019-02-24 NOTE — PROGRESS NOTE ADULT - PROBLEM SELECTOR PLAN 1
Comes in with generalized anasarca, extremities , abdomen with mild sob , otherwise comfortable appearing on exam   Likely from hypoalbuminemia  Could be from chronic state of immobilization, poor po intake in past due to sbo  No known heart failure, no JVD on exam , pro-bnp not so high (164) ; pending ECHO :normal   No known liver disease, lfts WNL   No known malignancy or risk factors , outpatient screening negative for cancer ; however CEA elevated ; CT abdomen shows no mass   No exposure to TB/ no night sweats / weight loss   On lasix 40 bid at home   lasix tapered to 20 iv bid  Cain catheter to monitor uop   I&Os strict    f/u TSH wnl  UA negative for proteinuria    s/p paracentesis ; f/u fluid studies  SAAG 0.8 ( <1 ) with total protein <2.5 , consistent with tuberculous/ nephrotic  Ua negative for protein , less likely nephrotic   Will obtain QuantiFeron gold   f/u fluid cytology

## 2019-02-24 NOTE — PROGRESS NOTE ADULT - PROBLEM SELECTOR PLAN 3
h/o RINKU on oral iron ( iron studies show Anemia in chronic dz)  Has occasional hemorrhoidal bleeding   No bleeding in last 24 hours   Last EGD / Colonoscopy : Esophagitis , gastritis , diverticulosis (2018)   hb today 7.1 , no signs of active bleeding   Will do occult stool exam  Repeat cbc , pt/inr / abo type and cross   Transfuse to keep hb >7    ** repeat hb is 7.3 , occult negative , normal rectal exam ; will monitor h/h for now   Patient was concerned about blood transfusion, and will consider only if absolutely needed , hence no consent in chart

## 2019-02-24 NOTE — PROGRESS NOTE ADULT - SUBJECTIVE AND OBJECTIVE BOX
CHIEF COMPLAINT:Patient is a 63y old  Female who presents with a chief complaint of sob , generalized body swelling.Pt aapears comfortable.    	  REVIEW OF SYSTEMS:  CONSTITUTIONAL: No fever, weight loss, or fatigue  EYES: No eye pain, visual disturbances, or discharge  ENT:  No difficulty hearing, tinnitus, vertigo; No sinus or throat pain  NECK: No pain or stiffness  RESPIRATORY: No cough, wheezing, chills or hemoptysis; No Shortness of Breath  CARDIOVASCULAR: No chest pain, palpitations, passing out, dizziness, or leg swelling  GASTROINTESTINAL: No abdominal or epigastric pain. No nausea, vomiting, or hematemesis; No diarrhea or constipation. No melena or hematochezia.  GENITOURINARY: No dysuria, frequency, hematuria, or incontinence  NEUROLOGICAL: No headaches, memory loss, loss of strength, numbness, or tremors  SKIN: No itching, burning, rashes, or lesions   LYMPH Nodes: No enlarged glands  ENDOCRINE: No heat or cold intolerance; No hair loss  MUSCULOSKELETAL: No joint pain or swelling; No muscle, back, or extremity pain  PSYCHIATRIC: No depression, anxiety, mood swings, or difficulty sleeping  HEME/LYMPH: No easy bruising, or bleeding gums  ALLERGY AND IMMUNOLOGIC: No hives or eczema	      PHYSICAL EXAM:  T(C): 36.7 (02-24-19 @ 11:25), Max: 37.1 (02-24-19 @ 05:06)  HR: 81 (02-24-19 @ 11:25) (72 - 93)  BP: 108/73 (02-24-19 @ 11:25) (94/56 - 108/74)  RR: 17 (02-24-19 @ 11:25) (17 - 19)  SpO2: 99% (02-24-19 @ 11:25) (99% - 100%)  Wt(kg): --  I&O's Summary    23 Feb 2019 07:01  -  24 Feb 2019 07:00  --------------------------------------------------------  IN: 0 mL / OUT: 1000 mL / NET: -1000 mL        Appearance: Normal	  HEENT:   Normal oral mucosa, PERRL, EOMI	  Lymphatic: No lymphadenopathy  Cardiovascular: Normal S1 S2, No JVD, No murmurs, No edema  Respiratory: Lungs clear to auscultation	  Psychiatry: A & O x 3, Mood & affect appropriate  Gastrointestinal:  Soft, Non-tender, + BS	  Skin: No rashes, No ecchymoses, No cyanosis	  Neurologic: Non-focal  Extremities: Normal range of motion, No clubbing, cyanosis or edema  Vascular: Peripheral pulses palpable 2+ bilaterally    MEDICATIONS  (STANDING):  dextrose 5%. 1000 milliLiter(s) (50 mL/Hr) IV Continuous <Continuous>  dextrose 50% Injectable 12.5 Gram(s) IV Push once  dextrose 50% Injectable 25 Gram(s) IV Push once  dextrose 50% Injectable 25 Gram(s) IV Push once  folic acid 1 milliGRAM(s) Oral daily  furosemide   Injectable 20 milliGRAM(s) IV Push two times a day  insulin lispro (HumaLOG) corrective regimen sliding scale   SubCutaneous every 6 hours  pantoprazole    Tablet 40 milliGRAM(s) Oral before breakfast  potassium acid phosphate/sodium acid phosphate tablet (K-PHOS No. 2) 1 Tablet(s) Oral four times a day with meals  potassium phosphate IVPB 15 milliMole(s) IV Intermittent once  rivaroxaban 20 milliGRAM(s) Oral every 24 hours  spironolactone 25 milliGRAM(s) Oral daily        	  LABS:	 	                         6.9    5.70  )-----------( 224      ( 24 Feb 2019 07:41 )             22.8     02-24    142  |  107  |  17  ----------------------------<  71  3.9   |  30  |  0.64    Ca    7.0<L>      24 Feb 2019 07:41  Phos  2.3     02-24  Mg     1.7     02-24    TPro  3.9<L>  /  Alb  1.3<L>  /  TBili  0.3  /  DBili  x   /  AST  18  /  ALT  25  /  AlkPhos  78  02-24    proBNP: Serum Pro-Brain Natriuretic Peptide: 164 pg/mL (02-19 @ 13:04)    Lipid Profile: Cholesterol 53  LDL 15  HDL 29  TG 43    HgA1c: Hemoglobin A1C, Whole Blood: <4.0 % (02-20 @ 12:59)    TSH: Thyroid Stimulating Hormone, Serum: 1.97 uU/mL (02-20 @ 07:08)

## 2019-02-24 NOTE — PROGRESS NOTE ADULT - ASSESSMENT
63 Female from home with PMH of Recurrent SBO's, s/p exp lap, SB resection,  DVT ( Left UE , then Left LE) , PE , chronic leg swelling , comes in with complaint of generalized body swelling and sob.   1.Heme/Onc f/u.  2.Anasarca-diuretics.  3.Ascites-s/p paracenthesis,cont aldactone.,  4.DVT and PE on xarelto.  5.PPI.  6.Anemia-transfuse prbc.

## 2019-02-24 NOTE — PROGRESS NOTE ADULT - SUBJECTIVE AND OBJECTIVE BOX
Patient was seen and examined  Patient is a 63y old  Female who presents with a chief complaint of sob , generalized body swelling (24 Feb 2019 11:40)      INTERVAL HPI/OVERNIGHT EVENTS:  T(C): 36.7 (02-24-19 @ 14:30), Max: 37.1 (02-24-19 @ 05:06)  HR: 79 (02-24-19 @ 14:30) (72 - 81)  BP: 105/61 (02-24-19 @ 14:30) (94/56 - 108/73)  RR: 17 (02-24-19 @ 14:30) (17 - 19)  SpO2: 100% (02-24-19 @ 14:30) (99% - 100%)  Wt(kg): --  I&O's Summary    23 Feb 2019 07:01  -  24 Feb 2019 07:00  --------------------------------------------------------  IN: 0 mL / OUT: 1000 mL / NET: -1000 mL        LABS:                        6.9    5.70  )-----------( 224      ( 24 Feb 2019 07:41 )             22.8     02-24    142  |  107  |  17  ----------------------------<  71  3.9   |  30  |  0.64    Ca    7.0<L>      24 Feb 2019 07:41  Phos  2.3     02-24  Mg     1.7     02-24    TPro  3.9<L>  /  Alb  1.3<L>  /  TBili  0.3  /  DBili  x   /  AST  18  /  ALT  25  /  AlkPhos  78  02-24    PT/INR - ( 23 Feb 2019 10:49 )   PT: 17.0 sec;   INR: 1.51 ratio         PTT - ( 23 Feb 2019 10:49 )  PTT:34.8 sec    CAPILLARY BLOOD GLUCOSE      POCT Blood Glucose.: 98 mg/dL (24 Feb 2019 11:09)  POCT Blood Glucose.: 82 mg/dL (24 Feb 2019 07:56)  POCT Blood Glucose.: 94 mg/dL (24 Feb 2019 05:39)  POCT Blood Glucose.: 91 mg/dL (24 Feb 2019 00:27)  POCT Blood Glucose.: 147 mg/dL (23 Feb 2019 17:55)              MEDICATIONS  (STANDING):  dextrose 5%. 1000 milliLiter(s) (50 mL/Hr) IV Continuous <Continuous>  dextrose 50% Injectable 12.5 Gram(s) IV Push once  dextrose 50% Injectable 25 Gram(s) IV Push once  dextrose 50% Injectable 25 Gram(s) IV Push once  folic acid 1 milliGRAM(s) Oral daily  furosemide   Injectable 20 milliGRAM(s) IV Push two times a day  insulin lispro (HumaLOG) corrective regimen sliding scale   SubCutaneous every 6 hours  pantoprazole    Tablet 40 milliGRAM(s) Oral before breakfast  potassium acid phosphate/sodium acid phosphate tablet (K-PHOS No. 2) 1 Tablet(s) Oral four times a day with meals  rivaroxaban 20 milliGRAM(s) Oral every 24 hours  spironolactone 25 milliGRAM(s) Oral daily    MEDICATIONS  (PRN):  acetaminophen   Tablet .. 650 milliGRAM(s) Oral every 6 hours PRN Mild Pain (1 - 3), Moderate Pain (4 - 6)  dextrose 40% Gel 15 Gram(s) Oral once PRN Blood Glucose LESS THAN 70 milliGRAM(s)/deciliter  glucagon  Injectable 1 milliGRAM(s) IntraMuscular once PRN Glucose LESS THAN 70 milligrams/deciliter      RADIOLOGY & ADDITIONAL TESTS:    Imaging Personally Reviewed:  [ ] YES  [ ] NO    REVIEW OF SYSTEMS:  CONSTITUTIONAL: No fever, weight loss, or fatigue  EYES: No eye pain, visual disturbances, or discharge  ENMT:  No difficulty hearing, tinnitus, vertigo; No sinus or throat pain  NECK: No pain or stiffness  BREASTS: No pain, masses, or nipple discharge  RESPIRATORY: No cough, wheezing, chills or hemoptysis; No shortness of breath  CARDIOVASCULAR: No chest pain, palpitations, dizziness, or leg swelling  GASTROINTESTINAL: No abdominal or epigastric pain. No nausea, vomiting, or hematemesis; No diarrhea or constipation. No melena or hematochezia.  GENITOURINARY: No dysuria, frequency, hematuria, or incontinence  NEUROLOGICAL: No headaches, memory loss, loss of strength, numbness, or tremors  SKIN: No itching, burning, rashes, or lesions   LYMPH NODES: No enlarged glands  ENDOCRINE: No heat or cold intolerance; No hair loss  MUSCULOSKELETAL: No joint pain or swelling; No muscle, back, or extremity pain  PSYCHIATRIC: No depression, anxiety, mood swings, or difficulty sleeping  HEME/LYMPH: No easy bruising, or bleeding gums  ALLERY AND IMMUNOLOGIC: No hives or eczema      Consultant(s) Notes Reviewed:  [ x ] YES  [ ] NO    PHYSICAL EXAM:  GENERAL: NAD, well-groomed, well-developed  HEAD:  Atraumatic, Normocephalic  EYES: EOMI, PERRLA, conjunctiva and sclera clear  ENMT: No tonsillar erythema, exudates, or enlargement; Moist mucous membranes, Good dentition, No lesions  NECK: Supple, No JVD, Normal thyroid  NERVOUS SYSTEM:  Alert & Oriented X3, Good concentration; Motor Strength 5/5 B/L upper and lower extremities; DTRs 2+ intact and symmetric  CHEST/LUNG: Clear to percussion bilaterally; No rales, rhonchi, wheezing, or rubs  HEART: Regular rate and rhythm; No murmurs, rubs, or gallops  ABDOMEN: Soft, Nontender, Nondistended; Bowel sounds present  EXTREMITIES:  2+ Peripheral Pulses, No clubbing, cyanosis, or edema  LYMPH: No lymphadenopathy noted  SKIN: No rashes or lesions    Care Discussed with Consultants/Other Providers [ x] YES  [ ] NO

## 2019-02-25 LAB
ALBUMIN SERPL ELPH-MCNC: 1.2 G/DL — LOW (ref 3.5–5)
ALP SERPL-CCNC: 78 U/L — SIGNIFICANT CHANGE UP (ref 40–120)
ALT FLD-CCNC: 25 U/L DA — SIGNIFICANT CHANGE UP (ref 10–60)
ANION GAP SERPL CALC-SCNC: 6 MMOL/L — SIGNIFICANT CHANGE UP (ref 5–17)
AST SERPL-CCNC: 19 U/L — SIGNIFICANT CHANGE UP (ref 10–40)
BASOPHILS # BLD AUTO: 0.05 K/UL — SIGNIFICANT CHANGE UP (ref 0–0.2)
BASOPHILS NFR BLD AUTO: 0.9 % — SIGNIFICANT CHANGE UP (ref 0–2)
BILIRUB SERPL-MCNC: 0.2 MG/DL — SIGNIFICANT CHANGE UP (ref 0.2–1.2)
BUN SERPL-MCNC: 15 MG/DL — SIGNIFICANT CHANGE UP (ref 7–18)
CALCIUM SERPL-MCNC: 7.3 MG/DL — LOW (ref 8.4–10.5)
CHLORIDE SERPL-SCNC: 109 MMOL/L — HIGH (ref 96–108)
CO2 SERPL-SCNC: 28 MMOL/L — SIGNIFICANT CHANGE UP (ref 22–31)
CREAT SERPL-MCNC: 0.58 MG/DL — SIGNIFICANT CHANGE UP (ref 0.5–1.3)
EOSINOPHIL # BLD AUTO: 0.09 K/UL — SIGNIFICANT CHANGE UP (ref 0–0.5)
EOSINOPHIL NFR BLD AUTO: 1.5 % — SIGNIFICANT CHANGE UP (ref 0–6)
GLUCOSE BLDC GLUCOMTR-MCNC: 92 MG/DL — SIGNIFICANT CHANGE UP (ref 70–99)
GLUCOSE SERPL-MCNC: 68 MG/DL — LOW (ref 70–99)
HCT VFR BLD CALC: 26.4 % — LOW (ref 34.5–45)
HGB BLD-MCNC: 8.3 G/DL — LOW (ref 11.5–15.5)
IMM GRANULOCYTES NFR BLD AUTO: 0.3 % — SIGNIFICANT CHANGE UP (ref 0–1.5)
LYMPHOCYTES # BLD AUTO: 1.27 K/UL — SIGNIFICANT CHANGE UP (ref 1–3.3)
LYMPHOCYTES # BLD AUTO: 21.7 % — SIGNIFICANT CHANGE UP (ref 13–44)
MAGNESIUM SERPL-MCNC: 1.8 MG/DL — SIGNIFICANT CHANGE UP (ref 1.6–2.6)
MCHC RBC-ENTMCNC: 28.4 PG — SIGNIFICANT CHANGE UP (ref 27–34)
MCHC RBC-ENTMCNC: 31.4 GM/DL — LOW (ref 32–36)
MCV RBC AUTO: 90.4 FL — SIGNIFICANT CHANGE UP (ref 80–100)
MONOCYTES # BLD AUTO: 0.52 K/UL — SIGNIFICANT CHANGE UP (ref 0–0.9)
MONOCYTES NFR BLD AUTO: 8.9 % — SIGNIFICANT CHANGE UP (ref 2–14)
NEUTROPHILS # BLD AUTO: 3.89 K/UL — SIGNIFICANT CHANGE UP (ref 1.8–7.4)
NEUTROPHILS NFR BLD AUTO: 66.7 % — SIGNIFICANT CHANGE UP (ref 43–77)
NRBC # BLD: 0 /100 WBCS — SIGNIFICANT CHANGE UP (ref 0–0)
PHOSPHATE SERPL-MCNC: 2.8 MG/DL — SIGNIFICANT CHANGE UP (ref 2.5–4.5)
PLATELET # BLD AUTO: 201 K/UL — SIGNIFICANT CHANGE UP (ref 150–400)
POTASSIUM SERPL-MCNC: 4.1 MMOL/L — SIGNIFICANT CHANGE UP (ref 3.5–5.3)
POTASSIUM SERPL-SCNC: 4.1 MMOL/L — SIGNIFICANT CHANGE UP (ref 3.5–5.3)
PROT SERPL-MCNC: 4.2 G/DL — LOW (ref 6–8.3)
RBC # BLD: 2.92 M/UL — LOW (ref 3.8–5.2)
RBC # FLD: 23.2 % — HIGH (ref 10.3–14.5)
SODIUM SERPL-SCNC: 143 MMOL/L — SIGNIFICANT CHANGE UP (ref 135–145)
WBC # BLD: 5.84 K/UL — SIGNIFICANT CHANGE UP (ref 3.8–10.5)
WBC # FLD AUTO: 5.84 K/UL — SIGNIFICANT CHANGE UP (ref 3.8–10.5)

## 2019-02-25 RX ORDER — MIDODRINE HYDROCHLORIDE 2.5 MG/1
2.5 TABLET ORAL EVERY 8 HOURS
Qty: 0 | Refills: 0 | Status: DISCONTINUED | OUTPATIENT
Start: 2019-02-25 | End: 2019-02-26

## 2019-02-25 RX ORDER — IRON SUCROSE 20 MG/ML
200 INJECTION, SOLUTION INTRAVENOUS
Qty: 0 | Refills: 0 | Status: COMPLETED | OUTPATIENT
Start: 2019-02-25 | End: 2019-02-27

## 2019-02-25 RX ADMIN — Medication 1 TABLET(S): at 08:16

## 2019-02-25 RX ADMIN — MIDODRINE HYDROCHLORIDE 2.5 MILLIGRAM(S): 2.5 TABLET ORAL at 13:58

## 2019-02-25 RX ADMIN — Medication 20 MILLIGRAM(S): at 17:27

## 2019-02-25 RX ADMIN — Medication 1 MILLIGRAM(S): at 12:05

## 2019-02-25 RX ADMIN — IRON SUCROSE 110 MILLIGRAM(S): 20 INJECTION, SOLUTION INTRAVENOUS at 12:05

## 2019-02-25 RX ADMIN — SPIRONOLACTONE 25 MILLIGRAM(S): 25 TABLET, FILM COATED ORAL at 05:30

## 2019-02-25 RX ADMIN — MIDODRINE HYDROCHLORIDE 2.5 MILLIGRAM(S): 2.5 TABLET ORAL at 21:44

## 2019-02-25 RX ADMIN — RIVAROXABAN 20 MILLIGRAM(S): KIT at 17:27

## 2019-02-25 RX ADMIN — PANTOPRAZOLE SODIUM 40 MILLIGRAM(S): 20 TABLET, DELAYED RELEASE ORAL at 05:30

## 2019-02-25 NOTE — PROGRESS NOTE ADULT - PROBLEM SELECTOR PLAN 1
Comes in with generalized anasarca, extremities, abdomen with mild sob, Otherwise comfortable appearing on exam   Likely from hypoalbuminemia  Could be from chronic state of immobilization, poor po intake in past due to sbo  No known heart failure, no JVD on exam , pro-bnp not so high (164) ; pending ECHO :normal   No known liver disease, lfts WNL   No known malignancy or risk factors , outpatient screening negative for cancer ; however CEA elevated ; CT abdomen shows no mass   No exposure to TB/ no night sweats / weight loss   On lasix 40 bid at home   lasix tapered to 20 iv bid  TSH wnl  UA negative for proteinuria    s/p paracentesis: Fluid studies showed 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 F/u Quantiferon testing.  Ua negative for protein, less likely nephrotic

## 2019-02-25 NOTE — PROGRESS NOTE ADULT - SUBJECTIVE AND OBJECTIVE BOX
CHIEF COMPLAINT:Patient is a 63y old  Female who presents with a chief complaint of sob , generalized body swelling.Pt appears comfortable.    	  REVIEW OF SYSTEMS:  CONSTITUTIONAL: No fever, weight loss, or fatigue  EYES: No eye pain, visual disturbances, or discharge  ENT:  No difficulty hearing, tinnitus, vertigo; No sinus or throat pain  NECK: No pain or stiffness  RESPIRATORY: No cough, wheezing, chills or hemoptysis; No Shortness of Breath  CARDIOVASCULAR: No chest pain, palpitations, passing out, dizziness, or leg swelling  GASTROINTESTINAL: No abdominal or epigastric pain. No nausea, vomiting, or hematemesis; No diarrhea or constipation. No melena or hematochezia.  GENITOURINARY: No dysuria, frequency, hematuria, or incontinence  NEUROLOGICAL: No headaches, memory loss, loss of strength, numbness, or tremors  SKIN: No itching, burning, rashes, or lesions   LYMPH Nodes: No enlarged glands  ENDOCRINE: No heat or cold intolerance; No hair loss  MUSCULOSKELETAL: No joint pain or swelling; No muscle, back, or extremity pain  PSYCHIATRIC: No depression, anxiety, mood swings, or difficulty sleeping  HEME/LYMPH: No easy bruising, or bleeding gums  ALLERGY AND IMMUNOLOGIC: No hives or eczema	      PHYSICAL EXAM:  T(C): 37 (02-25-19 @ 05:14), Max: 37 (02-24-19 @ 20:49)  HR: 84 (02-25-19 @ 05:14) (74 - 92)  BP: 99/58 (02-25-19 @ 05:14) (99/58 - 107/75)  RR: 17 (02-25-19 @ 05:14) (16 - 17)  SpO2: 99% (02-25-19 @ 05:14) (99% - 100%)  Wt(kg): --  I&O's Summary    24 Feb 2019 07:01  -  25 Feb 2019 07:00  --------------------------------------------------------  IN: 0 mL / OUT: 3000 mL / NET: -3000 mL        Appearance: Normal	  HEENT:   Normal oral mucosa, PERRL, EOMI	  Lymphatic: No lymphadenopathy  Cardiovascular: Normal S1 S2, No JVD, No murmurs,+1 edema  Respiratory: Lungs clear to auscultation	  Psychiatry: A & O x 3, Mood & affect appropriate  Gastrointestinal:  Soft, Non-tender, + BS	  Skin: No rashes, No ecchymoses, No cyanosis	  Neurologic: Non-focal  Extremities: Normal range of motion, No clubbing, cyanosis +1 edema  Vascular: Peripheral pulses palpable 2+ bilaterally    MEDICATIONS  (STANDING):  dextrose 5%. 1000 milliLiter(s) (50 mL/Hr) IV Continuous <Continuous>  dextrose 50% Injectable 12.5 Gram(s) IV Push once  folic acid 1 milliGRAM(s) Oral daily  furosemide   Injectable 20 milliGRAM(s) IV Push two times a day  iron sucrose IVPB 200 milliGRAM(s) IV Intermittent every 48 hours  pantoprazole    Tablet 40 milliGRAM(s) Oral before breakfast  rivaroxaban 20 milliGRAM(s) Oral every 24 hours  spironolactone 25 milliGRAM(s) Oral daily      LABS:	 	                       8.3    5.84  )-----------( 201      ( 25 Feb 2019 07:17 )             26.4     02-25    143  |  109<H>  |  15  ----------------------------<  68<L>  4.1   |  28  |  0.58    Ca    7.3<L>      25 Feb 2019 07:17  Phos  2.8     02-25  Mg     1.8     02-25    TPro  4.2<L>  /  Alb  1.2<L>  /  TBili  0.2  /  DBili  x   /  AST  19  /  ALT  25  /  AlkPhos  78  02-25    proBNP: Serum Pro-Brain Natriuretic Peptide: 164 pg/mL (02-19 @ 13:04)    Lipid Profile: Cholesterol 53  LDL 15  HDL 29  TG 43    HgA1c: Hemoglobin A1C, Whole Blood: <4.0 % (02-20 @ 12:59)    TSH: Thyroid Stimulating Hormone, Serum: 1.97 uU/mL (02-20 @ 07:08)

## 2019-02-25 NOTE — PROGRESS NOTE ADULT - ASSESSMENT
63 Female from home with PMH of Recurrent SBO's, s/p exp lap, SB resection,  DVT ( Left UE , then Left LE) , PE , chronic leg swelling , comes in with complaint of generalized body swelling and sob.

## 2019-02-25 NOTE — PROGRESS NOTE ADULT - PROBLEM SELECTOR PLAN 3
H/o RINKU on oral iron (iron studies show Anemia in chronic disease)  Has occasional hemorrhoidal bleeding   No bleeding in last 24 hours   Last EGD/Colonoscopy: Esophagitis, gastritis, diverticulosis (2018)   Hb today 7.1, no signs of active bleeding   FOBT negative  Transfuse to keep hb >7    ** repeat hb is 7.3 , occult negative , normal rectal exam ; will monitor h/h for now   Patient was concerned about blood transfusion, and will consider only if absolutely needed , hence no consent in chart

## 2019-02-25 NOTE — PROGRESS NOTE ADULT - SUBJECTIVE AND OBJECTIVE BOX
Patient is a 63y old  Female who presents with a chief complaint of sob , generalized body swelling (25 Feb 2019 11:45)      OVERNIGHT EVENTS:  Patient seen and examined at bedside. Denies chest pain, palpitation, SOB, nausea, vomiting, abdominal pain.    T(C): 37 (02-25-19 @ 05:14), Max: 37 (02-24-19 @ 20:49)  HR: 84 (02-25-19 @ 05:14) (74 - 92)  BP: 99/58 (02-25-19 @ 05:14) (99/58 - 107/75)  RR: 17 (02-25-19 @ 05:14) (16 - 17)  SpO2: 99% (02-25-19 @ 05:14) (99% - 100%)  Wt(kg): --  I&O's Summary    24 Feb 2019 07:01  -  25 Feb 2019 07:00  --------------------------------------------------------  IN: 0 mL / OUT: 3000 mL / NET: -3000 mL      MEDICATIONS  (STANDING):  dextrose 5%. 1000 milliLiter(s) (50 mL/Hr) IV Continuous <Continuous>  dextrose 50% Injectable 12.5 Gram(s) IV Push once  folic acid 1 milliGRAM(s) Oral daily  furosemide   Injectable 20 milliGRAM(s) IV Push two times a day  iron sucrose IVPB 200 milliGRAM(s) IV Intermittent every 48 hours  midodrine 2.5 milliGRAM(s) Oral every 8 hours  pantoprazole    Tablet 40 milliGRAM(s) Oral before breakfast  rivaroxaban 20 milliGRAM(s) Oral every 24 hours  spironolactone 25 milliGRAM(s) Oral daily    MEDICATIONS  (PRN):  acetaminophen   Tablet .. 650 milliGRAM(s) Oral every 6 hours PRN Mild Pain (1 - 3), Moderate Pain (4 - 6)  dextrose 40% Gel 15 Gram(s) Oral once PRN Blood Glucose LESS THAN 70 milliGRAM(s)/deciliter  glucagon  Injectable 1 milliGRAM(s) IntraMuscular once PRN Glucose LESS THAN 70 milligrams/deciliter      REVIEW OF SYSTEMS:  No fever,   No cough, SOB  No chest pain, palpitations  No Abd pain, nausea, vomiting, No diarrhea or constipation      PHYSICAL EXAM:    A X O x 3  EYES: EOMI, PERRLA,  NECK: Supple, No JVD, Normal thyroid  Resp: CTAB, No crackles, wheezing,   CVS: Regular rate and rhythm; No murmurs, rubs, or gallops  ABD: Soft, Nontender, Nondistended; Bowel sounds present  EXTREMITIES:  2+ Peripheral Pulses, No edema    LABS:                        8.3    5.84  )-----------( 201      ( 25 Feb 2019 07:17 )             26.4     02-25    143  |  109<H>  |  15  ----------------------------<  68<L>  4.1   |  28  |  0.58    Ca    7.3<L>      25 Feb 2019 07:17  Phos  2.8     02-25  Mg     1.8     02-25    TPro  4.2<L>  /  Alb  1.2<L>  /  TBili  0.2  /  DBili  x   /  AST  19  /  ALT  25  /  AlkPhos  78  02-25        CAPILLARY BLOOD GLUCOSE      POCT Blood Glucose.: 92 mg/dL (25 Feb 2019 07:43)  POCT Blood Glucose.: 174 mg/dL (24 Feb 2019 18:26)            Radiology:

## 2019-02-25 NOTE — PROGRESS NOTE ADULT - SUBJECTIVE AND OBJECTIVE BOX
feel better  swelling less  but still present  no N/V/D  stool has always been dark  Hb dropped yesterday    MEDICATIONS  (STANDING):  dextrose 5%. 1000 milliLiter(s) (50 mL/Hr) IV Continuous <Continuous>  dextrose 50% Injectable 12.5 Gram(s) IV Push once  folic acid 1 milliGRAM(s) Oral daily  furosemide   Injectable 20 milliGRAM(s) IV Push two times a day  pantoprazole    Tablet 40 milliGRAM(s) Oral before breakfast  rivaroxaban 20 milliGRAM(s) Oral every 24 hours  spironolactone 25 milliGRAM(s) Oral daily    MEDICATIONS  (PRN):  acetaminophen   Tablet .. 650 milliGRAM(s) Oral every 6 hours PRN Mild Pain (1 - 3), Moderate Pain (4 - 6)  dextrose 40% Gel 15 Gram(s) Oral once PRN Blood Glucose LESS THAN 70 milliGRAM(s)/deciliter  glucagon  Injectable 1 milliGRAM(s) IntraMuscular once PRN Glucose LESS THAN 70 milligrams/deciliter      Allergies    No Known Allergies    Intolerances        Vital Signs Last 24 Hrs  T(C): 37 (25 Feb 2019 05:14), Max: 37 (24 Feb 2019 10:50)  T(F): 98.6 (25 Feb 2019 05:14), Max: 98.6 (24 Feb 2019 10:50)  HR: 84 (25 Feb 2019 05:14) (74 - 92)  BP: 99/58 (25 Feb 2019 05:14) (96/56 - 108/73)  BP(mean): --  RR: 17 (25 Feb 2019 05:14) (16 - 17)  SpO2: 99% (25 Feb 2019 05:14) (99% - 100%)    PHYSICAL EXAM  General: adult in NAD  HEENT: clear oropharynx, anicteric sclera, pink conjunctiva  Neck: supple  CV: normal S1/S2 with no murmur rubs or gallops  Lungs: positive air movement b/l ant lungs,clear to auscultation, no wheezes, no rales  Abdomen: soft non-tender non-distended, no hepatosplenomegaly  Ext: no clubbing cyanosis or edema  Skin: no rashes and no petechiae  Neuro: alert and oriented X 4, no focal deficits  LABS:                          8.3    5.84  )-----------( 201      ( 25 Feb 2019 07:17 )             26.4         Mean Cell Volume : 90.4 fl  Mean Cell Hemoglobin : 28.4 pg  Mean Cell Hemoglobin Concentration : 31.4 gm/dL  Auto Neutrophil # : 3.89 K/uL  Auto Lymphocyte # : 1.27 K/uL  Auto Monocyte # : 0.52 K/uL  Auto Eosinophil # : 0.09 K/uL  Auto Basophil # : 0.05 K/uL  Auto Neutrophil % : 66.7 %  Auto Lymphocyte % : 21.7 %  Auto Monocyte % : 8.9 %  Auto Eosinophil % : 1.5 %  Auto Basophil % : 0.9 %    Serial CBC  Hematocrit 26.4  Hemoglobin 8.3  Plat 201  RBC 2.92  WBC 5.84  Serial CBC  Hematocrit 22.8  Hemoglobin 6.9  Plat 224  RBC 2.42  WBC 5.70  Serial CBC  Hematocrit 24.3  Hemoglobin 7.3  Plat 233  RBC 2.60  WBC 5.89  Serial CBC  Hematocrit 23.4  Hemoglobin 7.1  Plat 227  RBC 2.51  WBC 5.61  Serial CBC  Hematocrit 25.8  Hemoglobin 8.1  Plat 238  RBC 2.84  WBC 5.75    02-25    143  |  109<H>  |  15  ----------------------------<  68<L>  4.1   |  28  |  0.58    Ca    7.3<L>      25 Feb 2019 07:17  Phos  2.8     02-25  Mg     1.8     02-25    TPro  4.2<L>  /  Alb  1.2<L>  /  TBili  0.2  /  DBili  x   /  AST  19  /  ALT  25  /  AlkPhos  78  02-25      PT/INR - ( 23 Feb 2019 10:49 )   PT: 17.0 sec;   INR: 1.51 ratio         PTT - ( 23 Feb 2019 10:49 )  PTT:34.8 sec    Ferritin, Serum: 56 ng/mL (02-22 @ 15:44)  Vitamin B12, Serum: 508 pg/mL (02-22 @ 15:44)  Iron - Total Binding Capacity.: 105 ug/dL (02-21 @ 07:44)            BLOOD SMEAR INTERPRETATION:       RADIOLOGY & ADDITIONAL STUDIES:

## 2019-02-25 NOTE — PROGRESS NOTE ADULT - ASSESSMENT
63 Female from home with PMH of Recurrent SBO's, s/p exp lap, SB resection,  DVT ( Left UE , then Left LE) , PE , chronic leg swelling , comes in with complaint of generalized body swelling and sob.   1.Heme/Onc f/u.  2.Anasarca-diuretics.  3.Ascites-s/p paracenthesis,cont aldactone.,  4.DVT and PE on xarelto.  5.PPI.  6.Add midodrine 2.5 mg tid for bp support.

## 2019-02-26 LAB
ALBUMIN SERPL ELPH-MCNC: 1.4 G/DL — LOW (ref 3.5–5)
ALP SERPL-CCNC: 70 U/L — SIGNIFICANT CHANGE UP (ref 40–120)
ALT FLD-CCNC: 27 U/L DA — SIGNIFICANT CHANGE UP (ref 10–60)
ANION GAP SERPL CALC-SCNC: 3 MMOL/L — LOW (ref 5–17)
AST SERPL-CCNC: 17 U/L — SIGNIFICANT CHANGE UP (ref 10–40)
BASOPHILS # BLD AUTO: 0.03 K/UL — SIGNIFICANT CHANGE UP (ref 0–0.2)
BASOPHILS NFR BLD AUTO: 0.6 % — SIGNIFICANT CHANGE UP (ref 0–2)
BILIRUB SERPL-MCNC: 0.3 MG/DL — SIGNIFICANT CHANGE UP (ref 0.2–1.2)
BUN SERPL-MCNC: 18 MG/DL — SIGNIFICANT CHANGE UP (ref 7–18)
CALCIUM SERPL-MCNC: 7.6 MG/DL — LOW (ref 8.4–10.5)
CHLORIDE SERPL-SCNC: 106 MMOL/L — SIGNIFICANT CHANGE UP (ref 96–108)
CO2 SERPL-SCNC: 34 MMOL/L — HIGH (ref 22–31)
CREAT SERPL-MCNC: 0.71 MG/DL — SIGNIFICANT CHANGE UP (ref 0.5–1.3)
CULTURE RESULTS: SIGNIFICANT CHANGE UP
EOSINOPHIL # BLD AUTO: 0.04 K/UL — SIGNIFICANT CHANGE UP (ref 0–0.5)
EOSINOPHIL NFR BLD AUTO: 0.8 % — SIGNIFICANT CHANGE UP (ref 0–6)
GAMMA INTERFERON BACKGROUND BLD IA-ACNC: 0.03 IU/ML — SIGNIFICANT CHANGE UP
GLUCOSE SERPL-MCNC: 74 MG/DL — SIGNIFICANT CHANGE UP (ref 70–99)
HCT VFR BLD CALC: 26.5 % — LOW (ref 34.5–45)
HGB BLD-MCNC: 8.3 G/DL — LOW (ref 11.5–15.5)
IMM GRANULOCYTES NFR BLD AUTO: 0.4 % — SIGNIFICANT CHANGE UP (ref 0–1.5)
LYMPHOCYTES # BLD AUTO: 1.26 K/UL — SIGNIFICANT CHANGE UP (ref 1–3.3)
LYMPHOCYTES # BLD AUTO: 24.7 % — SIGNIFICANT CHANGE UP (ref 13–44)
M TB IFN-G BLD-IMP: NEGATIVE — SIGNIFICANT CHANGE UP
M TB IFN-G CD4+ BCKGRND COR BLD-ACNC: 0.01 IU/ML — SIGNIFICANT CHANGE UP
M TB IFN-G CD4+CD8+ BCKGRND COR BLD-ACNC: 0.01 IU/ML — SIGNIFICANT CHANGE UP
MAGNESIUM SERPL-MCNC: 1.7 MG/DL — SIGNIFICANT CHANGE UP (ref 1.6–2.6)
MCHC RBC-ENTMCNC: 28.6 PG — SIGNIFICANT CHANGE UP (ref 27–34)
MCHC RBC-ENTMCNC: 31.3 GM/DL — LOW (ref 32–36)
MCV RBC AUTO: 91.4 FL — SIGNIFICANT CHANGE UP (ref 80–100)
MONOCYTES # BLD AUTO: 0.53 K/UL — SIGNIFICANT CHANGE UP (ref 0–0.9)
MONOCYTES NFR BLD AUTO: 10.4 % — SIGNIFICANT CHANGE UP (ref 2–14)
NEUTROPHILS # BLD AUTO: 3.22 K/UL — SIGNIFICANT CHANGE UP (ref 1.8–7.4)
NEUTROPHILS NFR BLD AUTO: 63.1 % — SIGNIFICANT CHANGE UP (ref 43–77)
NRBC # BLD: 0 /100 WBCS — SIGNIFICANT CHANGE UP (ref 0–0)
PHOSPHATE SERPL-MCNC: 3.4 MG/DL — SIGNIFICANT CHANGE UP (ref 2.5–4.5)
PLATELET # BLD AUTO: 243 K/UL — SIGNIFICANT CHANGE UP (ref 150–400)
POTASSIUM SERPL-MCNC: 4.4 MMOL/L — SIGNIFICANT CHANGE UP (ref 3.5–5.3)
POTASSIUM SERPL-SCNC: 4.4 MMOL/L — SIGNIFICANT CHANGE UP (ref 3.5–5.3)
PROT SERPL-MCNC: 4.3 G/DL — LOW (ref 6–8.3)
QUANT TB PLUS MITOGEN MINUS NIL: 1.91 IU/ML — SIGNIFICANT CHANGE UP
RBC # BLD: 2.9 M/UL — LOW (ref 3.8–5.2)
RBC # FLD: 23.1 % — HIGH (ref 10.3–14.5)
SODIUM SERPL-SCNC: 143 MMOL/L — SIGNIFICANT CHANGE UP (ref 135–145)
SPECIMEN SOURCE: SIGNIFICANT CHANGE UP
WBC # BLD: 5.1 K/UL — SIGNIFICANT CHANGE UP (ref 3.8–10.5)
WBC # FLD AUTO: 5.1 K/UL — SIGNIFICANT CHANGE UP (ref 3.8–10.5)

## 2019-02-26 RX ORDER — MIDODRINE HYDROCHLORIDE 2.5 MG/1
5 TABLET ORAL EVERY 8 HOURS
Qty: 0 | Refills: 0 | Status: DISCONTINUED | OUTPATIENT
Start: 2019-02-26 | End: 2019-02-27

## 2019-02-26 RX ADMIN — Medication 20 MILLIGRAM(S): at 05:51

## 2019-02-26 RX ADMIN — RIVAROXABAN 20 MILLIGRAM(S): KIT at 17:23

## 2019-02-26 RX ADMIN — MIDODRINE HYDROCHLORIDE 5 MILLIGRAM(S): 2.5 TABLET ORAL at 21:05

## 2019-02-26 RX ADMIN — Medication 1 MILLIGRAM(S): at 11:30

## 2019-02-26 RX ADMIN — Medication 20 MILLIGRAM(S): at 17:23

## 2019-02-26 RX ADMIN — PANTOPRAZOLE SODIUM 40 MILLIGRAM(S): 20 TABLET, DELAYED RELEASE ORAL at 05:49

## 2019-02-26 RX ADMIN — MIDODRINE HYDROCHLORIDE 2.5 MILLIGRAM(S): 2.5 TABLET ORAL at 13:29

## 2019-02-26 RX ADMIN — MIDODRINE HYDROCHLORIDE 2.5 MILLIGRAM(S): 2.5 TABLET ORAL at 05:49

## 2019-02-26 NOTE — PROGRESS NOTE ADULT - SUBJECTIVE AND OBJECTIVE BOX
Patient is a 63y old  Female who presents with a chief complaint of sob , generalized body swelling (27 Feb 2019 10:58)      OVERNIGHT EVENTS:  Patient seen and examined at bedside. Denies chest pain, palpitation, SOB, nausea, vomiting, abdominal pain.    T(C): 36.9 (02-27-19 @ 05:08), Max: 36.9 (02-26-19 @ 14:08)  HR: 85 (02-27-19 @ 05:08) (84 - 87)  BP: 135/76 (02-27-19 @ 05:08) (96/74 - 135/76)  RR: 17 (02-27-19 @ 05:08) (16 - 18)  SpO2: 100% (02-27-19 @ 05:08) (99% - 100%)  Wt(kg): --  I&O's Summary    26 Feb 2019 07:01  -  27 Feb 2019 07:00  --------------------------------------------------------  IN: 0 mL / OUT: 3300 mL / NET: -3300 mL          REVIEW OF SYSTEMS:  No fever,   No cough, SOB  No chest pain, palpitations  No Abd pain, nausea, vomiting, No diarrhea or constipation      PHYSICAL EXAM:    A X O x 3  EYES: EOMI, PERRLA,  NECK: Supple, No JVD, Normal thyroid  Resp: CTAB, No crackles, wheezing,   CVS: Regular rate and rhythm; No murmurs, rubs, or gallops  ABD: Soft, Nontender, Nondistended; Bowel sounds present  EXTREMITIES:  2+ Peripheral Pulses, No edema    LABS:                        8.5    5.29  )-----------( 263      ( 27 Feb 2019 07:01 )             28.0     02-27    140  |  105  |  21<H>  ----------------------------<  73  4.6   |  33<H>  |  0.67    Ca    7.9<L>      27 Feb 2019 07:01  Phos  3.1     02-27  Mg     1.8     02-27    TPro  4.7<L>  /  Alb  1.5<L>  /  TBili  0.3  /  DBili  x   /  AST  16  /  ALT  26  /  AlkPhos  72  02-27        CAPILLARY BLOOD GLUCOSE                Radiology:

## 2019-02-26 NOTE — PROGRESS NOTE ADULT - PROBLEM SELECTOR PLAN 5
On Protonix   Last EGD / Colonoscopy : Esophagitis , gastritis , diverticulosis
Patient reports sob with cough with sputum * 1 day with reduced breath sounds on exam   CXR reports left retrocardiac opacity   Symptoms could just be from anasarca , however on abx empirically   Rocephin , Zithro   f/u procalcitonin : negative   Less likely pneumonia , will get CT chest : negative   Will dc abx
Patient reports sob with cough with sputum * 1 day with reduced breath sounds on exam   CXR reports left retrocardiac opacity   Symptoms could just be from anasarca , however on abx empirically   Rocephin , Zithro   f/u procalcitonin : negative   Less likely pneumonia , will get CT chest : negative   Will dc abx
Stable,  Tolerating diet
on xaralto for DVT/PE
on xaralto for DVT/PE

## 2019-02-26 NOTE — PROGRESS NOTE ADULT - PROBLEM SELECTOR PLAN 3
H/o RINKU on oral iron (iron studies show Anemia in chronic disease)  Has occasional hemorrhoidal bleeding   No bleeding in last 24 hours   Last EGD/Colonoscopy: Esophagitis, gastritis, diverticulosis (2018)   Hb today 7.1, no signs of active bleeding   FOBT negative  Transfuse to keep hb >7    ** repeat hb is 7.3 , occult negative , normal rectal exam ; will monitor h/h for now   Patient was concerned about blood transfusion, and will consider only if absolutely needed , hence no consent in chart H/o RINKU on oral iron (iron studies show Anemia in chronic disease)  Has occasional hemorrhoidal bleeding   No bleeding in last 24 hours   Last EGD/Colonoscopy: Esophagitis, gastritis, diverticulosis (2018)   Hb today 7.1, no signs of active bleeding   FOBT negative  Transfuse to keep hb >7

## 2019-02-26 NOTE — PROGRESS NOTE ADULT - SUBJECTIVE AND OBJECTIVE BOX
CHIEF COMPLAINT:Patient is a 63y old  Female who presents with a chief complaint of sob , generalized body swelling.pt appears comfortable.    	  REVIEW OF SYSTEMS:  CONSTITUTIONAL: No fever, weight loss, or fatigue  EYES: No eye pain, visual disturbances, or discharge  ENT:  No difficulty hearing, tinnitus, vertigo; No sinus or throat pain  NECK: No pain or stiffness  RESPIRATORY: No cough, wheezing, chills or hemoptysis; No Shortness of Breath  CARDIOVASCULAR: No chest pain, palpitations, passing out, dizziness, or leg swelling  GASTROINTESTINAL: No abdominal or epigastric pain. No nausea, vomiting, or hematemesis; No diarrhea or constipation. No melena or hematochezia.  GENITOURINARY: No dysuria, frequency, hematuria, or incontinence  NEUROLOGICAL: No headaches, memory loss, loss of strength, numbness, or tremors  SKIN: No itching, burning, rashes, or lesions   LYMPH Nodes: No enlarged glands  ENDOCRINE: No heat or cold intolerance; No hair loss  MUSCULOSKELETAL: No joint pain or swelling; No muscle, back, or extremity pain  PSYCHIATRIC: No depression, anxiety, mood swings, or difficulty sleeping  HEME/LYMPH: No easy bruising, or bleeding gums  ALLERGY AND IMMUNOLOGIC: No hives or eczema	      PHYSICAL EXAM:  T(C): 36.9 (02-26-19 @ 14:08), Max: 36.9 (02-26-19 @ 05:05)  HR: 87 (02-26-19 @ 14:08) (79 - 87)  BP: 96/74 (02-26-19 @ 14:08) (94/53 - 116/74)  RR: 18 (02-26-19 @ 14:08) (16 - 18)  SpO2: 99% (02-26-19 @ 14:08) (99% - 99%)  Wt(kg): --  I&O's Summary    25 Feb 2019 07:01  -  26 Feb 2019 07:00  --------------------------------------------------------  IN: 0 mL / OUT: 3400 mL / NET: -3400 mL    26 Feb 2019 07:01  -  26 Feb 2019 17:00  --------------------------------------------------------  IN: 0 mL / OUT: 700 mL / NET: -700 mL        Appearance: Normal	  HEENT:   Normal oral mucosa, PERRL, EOMI	  Lymphatic: No lymphadenopathy  Cardiovascular: Normal S1 S2, No JVD, No murmurs, No edema  Respiratory: Lungs clear to auscultation	  Psychiatry: A & O x 3, Mood & affect appropriate  Gastrointestinal:  Soft, Non-tender, + BS	  Skin: No rashes, No ecchymoses, No cyanosis	  Neurologic: Non-focal  Extremities: Normal range of motion, No clubbing, cyanosis or edema  Vascular: Peripheral pulses palpable 2+ bilaterally    MEDICATIONS  (STANDING):  folic acid 1 milliGRAM(s) Oral daily  furosemide   Injectable 20 milliGRAM(s) IV Push two times a day  iron sucrose IVPB 200 milliGRAM(s) IV Intermittent every 48 hours  midodrine 2.5 milliGRAM(s) Oral every 8 hours  pantoprazole    Tablet 40 milliGRAM(s) Oral before breakfast  rivaroxaban 20 milliGRAM(s) Oral every 24 hours  spironolactone 25 milliGRAM(s) Oral daily      LABS:	 	                       8.3    5.10  )-----------( 243      ( 26 Feb 2019 07:11 )             26.5     02-26    143  |  106  |  18  ----------------------------<  74  4.4   |  34<H>  |  0.71    Ca    7.6<L>      26 Feb 2019 07:11  Phos  3.4     02-26  Mg     1.7     02-26    TPro  4.3<L>  /  Alb  1.4<L>  /  TBili  0.3  /  DBili  x   /  AST  17  /  ALT  27  /  AlkPhos  70  02-26    proBNP: Serum Pro-Brain Natriuretic Peptide: 164 pg/mL (02-19 @ 13:04)    Lipid Profile: Cholesterol 53  LDL 15  HDL 29  TG 43    HgA1c: Hemoglobin A1C, Whole Blood: <4.0 % (02-20 @ 12:59)    TSH: Thyroid Stimulating Hormone, Serum: 1.97 uU/mL (02-20 @ 07:08)

## 2019-02-26 NOTE — PROGRESS NOTE ADULT - ASSESSMENT
63 Female from home with PMH of Recurrent SBO's, s/p exp lap, SB resection,  DVT ( Left UE , then Left LE) , PE , chronic leg swelling , comes in with complaint of generalized body swelling and sob.   1.Heme/Onc f/u.  2.Anasarca-diuretics.  3.Ascites-s/p paracenthesis,cont aldactone.,  4.DVT and PE on xarelto.  5.PPI.  6.Increase midodrine 5 mg tid for bp support.

## 2019-02-26 NOTE — PROGRESS NOTE ADULT - PROBLEM SELECTOR PROBLEM 5
GERD (gastroesophageal reflux disease)
Prophylactic measure
R/O Pneumonia
R/O Pneumonia
SBO (small bowel obstruction)
Prophylactic measure

## 2019-02-27 VITALS
DIASTOLIC BLOOD PRESSURE: 64 MMHG | TEMPERATURE: 99 F | SYSTOLIC BLOOD PRESSURE: 98 MMHG | HEART RATE: 88 BPM | OXYGEN SATURATION: 99 % | RESPIRATION RATE: 18 BRPM

## 2019-02-27 LAB
ALBUMIN SERPL ELPH-MCNC: 1.5 G/DL — LOW (ref 3.5–5)
ALP SERPL-CCNC: 72 U/L — SIGNIFICANT CHANGE UP (ref 40–120)
ALT FLD-CCNC: 26 U/L DA — SIGNIFICANT CHANGE UP (ref 10–60)
ANION GAP SERPL CALC-SCNC: 2 MMOL/L — LOW (ref 5–17)
AST SERPL-CCNC: 16 U/L — SIGNIFICANT CHANGE UP (ref 10–40)
BASOPHILS # BLD AUTO: 0.06 K/UL — SIGNIFICANT CHANGE UP (ref 0–0.2)
BASOPHILS NFR BLD AUTO: 1.1 % — SIGNIFICANT CHANGE UP (ref 0–2)
BILIRUB SERPL-MCNC: 0.3 MG/DL — SIGNIFICANT CHANGE UP (ref 0.2–1.2)
BUN SERPL-MCNC: 21 MG/DL — HIGH (ref 7–18)
CALCIUM SERPL-MCNC: 7.9 MG/DL — LOW (ref 8.4–10.5)
CHLORIDE SERPL-SCNC: 105 MMOL/L — SIGNIFICANT CHANGE UP (ref 96–108)
CO2 SERPL-SCNC: 33 MMOL/L — HIGH (ref 22–31)
CREAT SERPL-MCNC: 0.67 MG/DL — SIGNIFICANT CHANGE UP (ref 0.5–1.3)
EOSINOPHIL # BLD AUTO: 0.04 K/UL — SIGNIFICANT CHANGE UP (ref 0–0.5)
EOSINOPHIL NFR BLD AUTO: 0.8 % — SIGNIFICANT CHANGE UP (ref 0–6)
GLUCOSE SERPL-MCNC: 73 MG/DL — SIGNIFICANT CHANGE UP (ref 70–99)
HCT VFR BLD CALC: 28 % — LOW (ref 34.5–45)
HGB BLD-MCNC: 8.5 G/DL — LOW (ref 11.5–15.5)
IMM GRANULOCYTES NFR BLD AUTO: 0.4 % — SIGNIFICANT CHANGE UP (ref 0–1.5)
LYMPHOCYTES # BLD AUTO: 1.44 K/UL — SIGNIFICANT CHANGE UP (ref 1–3.3)
LYMPHOCYTES # BLD AUTO: 27.2 % — SIGNIFICANT CHANGE UP (ref 13–44)
MAGNESIUM SERPL-MCNC: 1.8 MG/DL — SIGNIFICANT CHANGE UP (ref 1.6–2.6)
MCHC RBC-ENTMCNC: 28.2 PG — SIGNIFICANT CHANGE UP (ref 27–34)
MCHC RBC-ENTMCNC: 30.4 GM/DL — LOW (ref 32–36)
MCV RBC AUTO: 93 FL — SIGNIFICANT CHANGE UP (ref 80–100)
MONOCYTES # BLD AUTO: 0.67 K/UL — SIGNIFICANT CHANGE UP (ref 0–0.9)
MONOCYTES NFR BLD AUTO: 12.7 % — SIGNIFICANT CHANGE UP (ref 2–14)
NEUTROPHILS # BLD AUTO: 3.06 K/UL — SIGNIFICANT CHANGE UP (ref 1.8–7.4)
NEUTROPHILS NFR BLD AUTO: 57.8 % — SIGNIFICANT CHANGE UP (ref 43–77)
NRBC # BLD: 0 /100 WBCS — SIGNIFICANT CHANGE UP (ref 0–0)
PHOSPHATE SERPL-MCNC: 3.1 MG/DL — SIGNIFICANT CHANGE UP (ref 2.5–4.5)
PLATELET # BLD AUTO: 263 K/UL — SIGNIFICANT CHANGE UP (ref 150–400)
POTASSIUM SERPL-MCNC: 4.6 MMOL/L — SIGNIFICANT CHANGE UP (ref 3.5–5.3)
POTASSIUM SERPL-SCNC: 4.6 MMOL/L — SIGNIFICANT CHANGE UP (ref 3.5–5.3)
PROT SERPL-MCNC: 4.7 G/DL — LOW (ref 6–8.3)
RBC # BLD: 3.01 M/UL — LOW (ref 3.8–5.2)
RBC # FLD: 22.7 % — HIGH (ref 10.3–14.5)
SODIUM SERPL-SCNC: 140 MMOL/L — SIGNIFICANT CHANGE UP (ref 135–145)
WBC # BLD: 5.29 K/UL — SIGNIFICANT CHANGE UP (ref 3.8–10.5)
WBC # FLD AUTO: 5.29 K/UL — SIGNIFICANT CHANGE UP (ref 3.8–10.5)

## 2019-02-27 PROCEDURE — 93970 EXTREMITY STUDY: CPT

## 2019-02-27 PROCEDURE — 93306 TTE W/DOPPLER COMPLETE: CPT

## 2019-02-27 PROCEDURE — 85027 COMPLETE CBC AUTOMATED: CPT

## 2019-02-27 PROCEDURE — 86480 TB TEST CELL IMMUN MEASURE: CPT

## 2019-02-27 PROCEDURE — P9040: CPT

## 2019-02-27 PROCEDURE — 93005 ELECTROCARDIOGRAM TRACING: CPT

## 2019-02-27 PROCEDURE — 88341 IMHCHEM/IMCYTCHM EA ADD ANTB: CPT

## 2019-02-27 PROCEDURE — 86803 HEPATITIS C AB TEST: CPT

## 2019-02-27 PROCEDURE — 85610 PROTHROMBIN TIME: CPT

## 2019-02-27 PROCEDURE — 76942 ECHO GUIDE FOR BIOPSY: CPT

## 2019-02-27 PROCEDURE — 74018 RADEX ABDOMEN 1 VIEW: CPT

## 2019-02-27 PROCEDURE — 87070 CULTURE OTHR SPECIMN AEROBIC: CPT

## 2019-02-27 PROCEDURE — 82607 VITAMIN B-12: CPT

## 2019-02-27 PROCEDURE — 82945 GLUCOSE OTHER FLUID: CPT

## 2019-02-27 PROCEDURE — 89051 BODY FLUID CELL COUNT: CPT

## 2019-02-27 PROCEDURE — 83036 HEMOGLOBIN GLYCOSYLATED A1C: CPT

## 2019-02-27 PROCEDURE — 87116 MYCOBACTERIA CULTURE: CPT

## 2019-02-27 PROCEDURE — 71260 CT THORAX DX C+: CPT

## 2019-02-27 PROCEDURE — 83880 ASSAY OF NATRIURETIC PEPTIDE: CPT

## 2019-02-27 PROCEDURE — 87206 SMEAR FLUORESCENT/ACID STAI: CPT

## 2019-02-27 PROCEDURE — 88305 TISSUE EXAM BY PATHOLOGIST: CPT

## 2019-02-27 PROCEDURE — 99285 EMERGENCY DEPT VISIT HI MDM: CPT | Mod: 25

## 2019-02-27 PROCEDURE — 83605 ASSAY OF LACTIC ACID: CPT

## 2019-02-27 PROCEDURE — 87075 CULTR BACTERIA EXCEPT BLOOD: CPT

## 2019-02-27 PROCEDURE — 88112 CYTOPATH CELL ENHANCE TECH: CPT

## 2019-02-27 PROCEDURE — 74177 CT ABD & PELVIS W/CONTRAST: CPT

## 2019-02-27 PROCEDURE — 81003 URINALYSIS AUTO W/O SCOPE: CPT

## 2019-02-27 PROCEDURE — 36430 TRANSFUSION BLD/BLD COMPNT: CPT

## 2019-02-27 PROCEDURE — 84145 PROCALCITONIN (PCT): CPT

## 2019-02-27 PROCEDURE — 85730 THROMBOPLASTIN TIME PARTIAL: CPT

## 2019-02-27 PROCEDURE — C1769: CPT

## 2019-02-27 PROCEDURE — 86923 COMPATIBILITY TEST ELECTRIC: CPT

## 2019-02-27 PROCEDURE — 82272 OCCULT BLD FECES 1-3 TESTS: CPT

## 2019-02-27 PROCEDURE — 76705 ECHO EXAM OF ABDOMEN: CPT

## 2019-02-27 PROCEDURE — 80061 LIPID PANEL: CPT

## 2019-02-27 PROCEDURE — 83615 LACTATE (LD) (LDH) ENZYME: CPT

## 2019-02-27 PROCEDURE — 86900 BLOOD TYPING SEROLOGIC ABO: CPT

## 2019-02-27 PROCEDURE — 82042 OTHER SOURCE ALBUMIN QUAN EA: CPT

## 2019-02-27 PROCEDURE — 87086 URINE CULTURE/COLONY COUNT: CPT

## 2019-02-27 PROCEDURE — 82248 BILIRUBIN DIRECT: CPT

## 2019-02-27 PROCEDURE — 86850 RBC ANTIBODY SCREEN: CPT

## 2019-02-27 PROCEDURE — 84443 ASSAY THYROID STIM HORMONE: CPT

## 2019-02-27 PROCEDURE — 88342 IMHCHEM/IMCYTCHM 1ST ANTB: CPT

## 2019-02-27 PROCEDURE — 83735 ASSAY OF MAGNESIUM: CPT

## 2019-02-27 PROCEDURE — 49083 ABD PARACENTESIS W/IMAGING: CPT

## 2019-02-27 PROCEDURE — 71045 X-RAY EXAM CHEST 1 VIEW: CPT

## 2019-02-27 PROCEDURE — 36415 COLL VENOUS BLD VENIPUNCTURE: CPT

## 2019-02-27 PROCEDURE — 84157 ASSAY OF PROTEIN OTHER: CPT

## 2019-02-27 PROCEDURE — 80048 BASIC METABOLIC PNL TOTAL CA: CPT

## 2019-02-27 PROCEDURE — 82962 GLUCOSE BLOOD TEST: CPT

## 2019-02-27 PROCEDURE — 82728 ASSAY OF FERRITIN: CPT

## 2019-02-27 PROCEDURE — 86901 BLOOD TYPING SEROLOGIC RH(D): CPT

## 2019-02-27 PROCEDURE — 87205 SMEAR GRAM STAIN: CPT

## 2019-02-27 PROCEDURE — P9047: CPT

## 2019-02-27 PROCEDURE — 83550 IRON BINDING TEST: CPT

## 2019-02-27 PROCEDURE — 84484 ASSAY OF TROPONIN QUANT: CPT

## 2019-02-27 PROCEDURE — 87015 SPECIMEN INFECT AGNT CONCNTJ: CPT

## 2019-02-27 PROCEDURE — 80053 COMPREHEN METABOLIC PANEL: CPT

## 2019-02-27 PROCEDURE — 87631 RESP VIRUS 3-5 TARGETS: CPT

## 2019-02-27 PROCEDURE — 82378 CARCINOEMBRYONIC ANTIGEN: CPT

## 2019-02-27 PROCEDURE — C1729: CPT

## 2019-02-27 PROCEDURE — 83540 ASSAY OF IRON: CPT

## 2019-02-27 PROCEDURE — 84100 ASSAY OF PHOSPHORUS: CPT

## 2019-02-27 RX ORDER — SPIRONOLACTONE 25 MG/1
1 TABLET, FILM COATED ORAL
Qty: 0 | Refills: 0 | COMMUNITY
Start: 2019-02-27

## 2019-02-27 RX ORDER — MIDODRINE HYDROCHLORIDE 2.5 MG/1
1 TABLET ORAL
Qty: 0 | Refills: 0 | COMMUNITY
Start: 2019-02-27

## 2019-02-27 RX ORDER — MIDODRINE HYDROCHLORIDE 2.5 MG/1
1 TABLET ORAL
Qty: 90 | Refills: 0
Start: 2019-02-27 | End: 2019-03-28

## 2019-02-27 RX ORDER — FUROSEMIDE 40 MG
1 TABLET ORAL
Qty: 60 | Refills: 0
Start: 2019-02-27 | End: 2019-03-28

## 2019-02-27 RX ORDER — SPIRONOLACTONE 25 MG/1
1 TABLET, FILM COATED ORAL
Qty: 30 | Refills: 0
Start: 2019-02-27 | End: 2019-03-28

## 2019-02-27 RX ORDER — FUROSEMIDE 40 MG
1 TABLET ORAL
Qty: 60 | Refills: 0 | OUTPATIENT
Start: 2019-02-27 | End: 2019-03-28

## 2019-02-27 RX ADMIN — RIVAROXABAN 20 MILLIGRAM(S): KIT at 17:18

## 2019-02-27 RX ADMIN — SPIRONOLACTONE 25 MILLIGRAM(S): 25 TABLET, FILM COATED ORAL at 05:51

## 2019-02-27 RX ADMIN — PANTOPRAZOLE SODIUM 40 MILLIGRAM(S): 20 TABLET, DELAYED RELEASE ORAL at 05:52

## 2019-02-27 RX ADMIN — MIDODRINE HYDROCHLORIDE 5 MILLIGRAM(S): 2.5 TABLET ORAL at 14:03

## 2019-02-27 RX ADMIN — IRON SUCROSE 110 MILLIGRAM(S): 20 INJECTION, SOLUTION INTRAVENOUS at 14:04

## 2019-02-27 RX ADMIN — MIDODRINE HYDROCHLORIDE 5 MILLIGRAM(S): 2.5 TABLET ORAL at 05:51

## 2019-02-27 RX ADMIN — Medication 20 MILLIGRAM(S): at 05:51

## 2019-02-27 RX ADMIN — Medication 1 MILLIGRAM(S): at 14:03

## 2019-02-27 RX ADMIN — Medication 20 MILLIGRAM(S): at 17:18

## 2019-02-27 NOTE — PROGRESS NOTE ADULT - PROBLEM SELECTOR PROBLEM 2
Anemia
DVT (deep venous thrombosis)
Anasarca
R/O Pneumonia
SBO (small bowel obstruction)

## 2019-02-27 NOTE — PROGRESS NOTE ADULT - SUBJECTIVE AND OBJECTIVE BOX
feel better  no sob, N/V  eat fine  swelling is less    MEDICATIONS  (STANDING):  folic acid 1 milliGRAM(s) Oral daily  furosemide   Injectable 20 milliGRAM(s) IV Push two times a day  iron sucrose IVPB 200 milliGRAM(s) IV Intermittent every 48 hours  midodrine 5 milliGRAM(s) Oral every 8 hours  pantoprazole    Tablet 40 milliGRAM(s) Oral before breakfast  rivaroxaban 20 milliGRAM(s) Oral every 24 hours  spironolactone 25 milliGRAM(s) Oral daily    MEDICATIONS  (PRN):  acetaminophen   Tablet .. 650 milliGRAM(s) Oral every 6 hours PRN Mild Pain (1 - 3), Moderate Pain (4 - 6)      Allergies    No Known Allergies    Intolerances        Vital Signs Last 24 Hrs  T(C): 36.9 (27 Feb 2019 05:08), Max: 36.9 (26 Feb 2019 14:08)  T(F): 98.5 (27 Feb 2019 05:08), Max: 98.5 (26 Feb 2019 14:08)  HR: 85 (27 Feb 2019 05:08) (84 - 87)  BP: 135/76 (27 Feb 2019 05:08) (96/74 - 135/76)  BP(mean): --  RR: 17 (27 Feb 2019 05:08) (16 - 18)  SpO2: 100% (27 Feb 2019 05:08) (99% - 100%)    PHYSICAL EXAM  General: adult in NAD  HEENT: clear oropharynx, anicteric sclera, pink conjunctiva  Neck: supple  CV: normal S1/S2 with no murmur rubs or gallops  Lungs: positive air movement b/l ant lungs,clear to auscultation, no wheezes, no rales  Abdomen: soft non-tender non-distended, no hepatosplenomegaly  Ext: no clubbing cyanosis or edema  Skin: no rashes and no petechiae  Neuro: alert and oriented X 4, no focal deficits  LABS:                          8.5    5.29  )-----------( 263      ( 27 Feb 2019 07:01 )             28.0         Mean Cell Volume : 93.0 fl  Mean Cell Hemoglobin : 28.2 pg  Mean Cell Hemoglobin Concentration : 30.4 gm/dL  Auto Neutrophil # : 3.06 K/uL  Auto Lymphocyte # : 1.44 K/uL  Auto Monocyte # : 0.67 K/uL  Auto Eosinophil # : 0.04 K/uL  Auto Basophil # : 0.06 K/uL  Auto Neutrophil % : 57.8 %  Auto Lymphocyte % : 27.2 %  Auto Monocyte % : 12.7 %  Auto Eosinophil % : 0.8 %  Auto Basophil % : 1.1 %    Serial CBC  Hematocrit 28.0  Hemoglobin 8.5  Plat 263  RBC 3.01  WBC 5.29  Serial CBC  Hematocrit 26.5  Hemoglobin 8.3  Plat 243  RBC 2.90  WBC 5.10  Serial CBC  Hematocrit 26.4  Hemoglobin 8.3  Plat 201  RBC 2.92  WBC 5.84  Serial CBC  Hematocrit 22.8  Hemoglobin 6.9  Plat 224  RBC 2.42  WBC 5.70    02-27    140  |  105  |  21<H>  ----------------------------<  73  4.6   |  33<H>  |  0.67    Ca    7.9<L>      27 Feb 2019 07:01  Phos  3.1     02-27  Mg     1.8     02-27    TPro  4.7<L>  /  Alb  1.5<L>  /  TBili  0.3  /  DBili  x   /  AST  16  /  ALT  26  /  AlkPhos  72  02-27          Ferritin, Serum: 56 ng/mL (02-22 @ 15:44)  Vitamin B12, Serum: 508 pg/mL (02-22 @ 15:44)  Iron - Total Binding Capacity.: 105 ug/dL (02-21 @ 07:44)            BLOOD SMEAR INTERPRETATION:       RADIOLOGY & ADDITIONAL STUDIES:

## 2019-02-27 NOTE — PROGRESS NOTE ADULT - ASSESSMENT
63 Female from home with PMH of Recurrent SBO's, s/p exp lap, SB resection,  DVT ( Left UE , then Left LE) , PE , chronic leg swelling , comes in with complaint of generalized body swelling and sob.   1.Heme/Onc f/u.  2.Anasarca-diuretics.  3.Ascites-s/p paracenthesis,cont aldactone.,  4.DVT and PE on xarelto.  5.PPI.  6.Continue midodrine 5 mg tid for bp support.

## 2019-02-27 NOTE — PROGRESS NOTE ADULT - SUBJECTIVE AND OBJECTIVE BOX
CHIEF COMPLAINT:Patient is a 63y old  Female who presents with a chief complaint of sob , generalized body swelling.Pt appears comfortable.    	  REVIEW OF SYSTEMS:  CONSTITUTIONAL: No fever, weight loss, or fatigue  EYES: No eye pain, visual disturbances, or discharge  ENT:  No difficulty hearing, tinnitus, vertigo; No sinus or throat pain  NECK: No pain or stiffness  RESPIRATORY: No cough, wheezing, chills or hemoptysis; No Shortness of Breath  CARDIOVASCULAR: No chest pain, palpitations, passing out, dizziness, or leg swelling  GASTROINTESTINAL: No abdominal or epigastric pain. No nausea, vomiting, or hematemesis; No diarrhea or constipation. No melena or hematochezia.  GENITOURINARY: No dysuria, frequency, hematuria, or incontinence  NEUROLOGICAL: No headaches, memory loss, loss of strength, numbness, or tremors  SKIN: No itching, burning, rashes, or lesions   LYMPH Nodes: No enlarged glands  ENDOCRINE: No heat or cold intolerance; No hair loss  MUSCULOSKELETAL: No joint pain or swelling; No muscle, back, or extremity pain  PSYCHIATRIC: No depression, anxiety, mood swings, or difficulty sleeping  HEME/LYMPH: No easy bruising, or bleeding gums  ALLERGY AND IMMUNOLOGIC: No hives or eczema	      PHYSICAL EXAM:  T(C): 36.9 (02-27-19 @ 05:08), Max: 36.9 (02-26-19 @ 14:08)  HR: 85 (02-27-19 @ 05:08) (84 - 87)  BP: 135/76 (02-27-19 @ 05:08) (96/74 - 135/76)  RR: 17 (02-27-19 @ 05:08) (16 - 18)  SpO2: 100% (02-27-19 @ 05:08) (99% - 100%)    I&O's Summary    26 Feb 2019 07:01  -  27 Feb 2019 07:00  --------------------------------------------------------  IN: 0 mL / OUT: 3300 mL / NET: -3300 mL    27 Feb 2019 07:01  -  27 Feb 2019 11:54  --------------------------------------------------------  IN: 0 mL / OUT: 127 mL / NET: -127 mL        Appearance: Normal	  HEENT:   Normal oral mucosa, PERRL, EOMI	  Lymphatic: No lymphadenopathy  Cardiovascular: Normal S1 S2, No JVD, No murmurs, No edema  Respiratory: Lungs clear to auscultation	  Psychiatry: A & O x 3, Mood & affect appropriate  Gastrointestinal:  Soft, Non-tender, + BS	  Skin: No rashes, No ecchymoses, No cyanosis	  Neurologic: Non-focal  Extremities: Normal range of motion, No clubbing, cyanosis or edema  Vascular: Peripheral pulses palpable 2+ bilaterally    MEDICATIONS  (STANDING):  folic acid 1 milliGRAM(s) Oral daily  furosemide   Injectable 20 milliGRAM(s) IV Push two times a day  iron sucrose IVPB 200 milliGRAM(s) IV Intermittent every 48 hours  midodrine 5 milliGRAM(s) Oral every 8 hours  pantoprazole    Tablet 40 milliGRAM(s) Oral before breakfast  rivaroxaban 20 milliGRAM(s) Oral every 24 hours  spironolactone 25 milliGRAM(s) Oral daily        	  LABS:	 	                       8.5    5.29  )-----------( 263      ( 27 Feb 2019 07:01 )             28.0     02-27    140  |  105  |  21<H>  ----------------------------<  73  4.6   |  33<H>  |  0.67    Ca    7.9<L>      27 Feb 2019 07:01  Phos  3.1     02-27  Mg     1.8     02-27    TPro  4.7<L>  /  Alb  1.5<L>  /  TBili  0.3  /  DBili  x   /  AST  16  /  ALT  26  /  AlkPhos  72  02-27    proBNP: Serum Pro-Brain Natriuretic Peptide: 164 pg/mL (02-19 @ 13:04)    Lipid Profile: Cholesterol 53  LDL 15  HDL 29  TG 43    HgA1c: Hemoglobin A1C, Whole Blood: <4.0 % (02-20 @ 12:59)    TSH: Thyroid Stimulating Hormone, Serum: 1.97 uU/mL (02-20 @ 07:08)

## 2019-02-27 NOTE — PROGRESS NOTE ADULT - PROBLEM SELECTOR PROBLEM 1
Anasarca
DVT (deep venous thrombosis)

## 2019-02-27 NOTE — PROGRESS NOTE ADULT - REASON FOR ADMISSION
sob , generalized body swelling

## 2019-03-19 PROBLEM — I26.99 OTHER PULMONARY EMBOLISM WITHOUT ACUTE COR PULMONALE: Chronic | Status: ACTIVE | Noted: 2019-02-19

## 2019-03-19 PROBLEM — E88.09 HYPOALBUMINEMIA: Status: ACTIVE | Noted: 2019-03-19

## 2019-03-26 ENCOUNTER — APPOINTMENT (OUTPATIENT)
Dept: SURGERY | Facility: CLINIC | Age: 64
End: 2019-03-26
Payer: MEDICAID

## 2019-03-26 DIAGNOSIS — Z87.19 PERSONAL HISTORY OF OTHER DISEASES OF THE DIGESTIVE SYSTEM: ICD-10-CM

## 2019-03-26 DIAGNOSIS — E88.09 OTHER DISORDERS OF PLASMA-PROTEIN METABOLISM, NOT ELSEWHERE CLASSIFIED: ICD-10-CM

## 2019-03-26 PROCEDURE — 99213 OFFICE O/P EST LOW 20 MIN: CPT

## 2019-03-26 NOTE — PLAN
[FreeTextEntry1] : patient may return to work\par patient will follow up in 3 months or if needed. Warning signs, follow up, and restrictions were discussed with the patient.

## 2019-03-26 NOTE — ASSESSMENT
[FreeTextEntry1] : Patient with history of SBO is s/p Exploratory laparotomy with lysis of adhesions on   02/12/2018.   Patient was admitted to hospital for Anasarca. Was likely from   hypoalbuminemia patient was  also  found to have PE and treated with Xarelto. today patient offers no complaints. she feels well and  reports no fever, chills, SOB  or  pain.  Patient reports good bowel movements and appetite. patient is asking if she could return back to work. \par

## 2019-03-26 NOTE — HISTORY OF PRESENT ILLNESS
[de-identified] : Patient with history of SBO is s/p Exploratory laparotomy with lysis of adhesions on   02/12/2018.   Patient was admitted to hospital for Anasarca. Was likely from   hypoalbuminemia patient was  also  found to have PE and treated with Xarelto. today patient offers no complaints. she feels well and  reports no fever, chills, SOB  or  pain.  Patient reports good bowel movements and appetite. patient is asking if she could return back to work. \par  \par  \par

## 2019-03-26 NOTE — PHYSICAL EXAM
[Abdominal Masses] : No abdominal masses [Abdomen Tenderness] : ~T ~M No abdominal tenderness [Alert] : alert [Oriented to Person] : oriented to person [Oriented to Place] : oriented to place [Oriented to Time] : oriented to time [Calm] : calm [de-identified] : The patient is alert, well-groomed, and cheerful.  [de-identified] : Thorax symmetric with good excursion. Lungs resonant. Breath sounds vesicular with no added sounds.

## 2019-03-27 NOTE — DIETITIAN INITIAL EVALUATION ADULT. - NS AS NUTRI INTERV MEDICAL AND FOOD SUPPLEMENTS
Patient declined to come in ahead of time for labs. Patient will come fasting to his appointment with Dr. Luis Armando Chao. Ensure Enlive 1 can BID as medically feasible/Commercial beverage

## 2019-04-13 LAB
CULTURE RESULTS: SIGNIFICANT CHANGE UP
SPECIMEN SOURCE: SIGNIFICANT CHANGE UP

## 2019-05-28 ENCOUNTER — APPOINTMENT (OUTPATIENT)
Dept: SURGERY | Facility: CLINIC | Age: 64
End: 2019-05-28
Payer: MEDICAID

## 2019-05-28 VITALS
WEIGHT: 142 LBS | BODY MASS INDEX: 22.29 KG/M2 | HEART RATE: 82 BPM | SYSTOLIC BLOOD PRESSURE: 106 MMHG | DIASTOLIC BLOOD PRESSURE: 67 MMHG | HEIGHT: 67 IN

## 2019-05-28 PROCEDURE — 99213 OFFICE O/P EST LOW 20 MIN: CPT

## 2019-06-04 ENCOUNTER — APPOINTMENT (OUTPATIENT)
Dept: SURGERY | Facility: CLINIC | Age: 64
End: 2019-06-04

## 2019-07-26 NOTE — PATIENT PROFILE ADULT - NSPROGENARRIVEDFROM_GEN_A_NUR
Chief Complaints and History of Present Illnesses   Patient presents with     Follow Up     Diabetic f/u     Chief Complaint(s) and History of Present Illness(es)     Follow Up     Treatments tried: no treatments    Pain scale: 0/10    Comments: Diabetic f/u              Comments     Patient states vision has been stable since last eye exam, both eyes. Denies eye pain or irritation. But eyes feel heavy and feeling a pressure behind both eyes. Happens when blood sugar is high. Does not take eye drops.    Mayela Joseph COT 9:00 AM July 26, 2019                     
home

## 2019-08-18 ENCOUNTER — INPATIENT (INPATIENT)
Facility: HOSPITAL | Age: 64
LOS: 5 days | Discharge: HOME | DRG: 641 | End: 2019-08-24
Attending: INTERNAL MEDICINE | Admitting: INTERNAL MEDICINE
Payer: MEDICAID

## 2019-08-18 VITALS
DIASTOLIC BLOOD PRESSURE: 67 MMHG | WEIGHT: 149.91 LBS | TEMPERATURE: 98 F | RESPIRATION RATE: 20 BRPM | SYSTOLIC BLOOD PRESSURE: 93 MMHG | OXYGEN SATURATION: 99 % | HEIGHT: 66 IN | HEART RATE: 101 BPM

## 2019-08-18 DIAGNOSIS — Z98.890 OTHER SPECIFIED POSTPROCEDURAL STATES: Chronic | ICD-10-CM

## 2019-08-18 DIAGNOSIS — R60.1 GENERALIZED EDEMA: ICD-10-CM

## 2019-08-18 LAB
ACETONE SERPL-MCNC: NEGATIVE — SIGNIFICANT CHANGE UP
ALBUMIN SERPL ELPH-MCNC: 1 G/DL — LOW (ref 3.5–5)
ALP SERPL-CCNC: 125 U/L — HIGH (ref 40–120)
ALT FLD-CCNC: 35 U/L DA — SIGNIFICANT CHANGE UP (ref 10–60)
ANION GAP SERPL CALC-SCNC: 6 MMOL/L — SIGNIFICANT CHANGE UP (ref 5–17)
ANISOCYTOSIS BLD QL: SLIGHT — SIGNIFICANT CHANGE UP
APTT BLD: 34.1 SEC — SIGNIFICANT CHANGE UP (ref 27.5–36.3)
AST SERPL-CCNC: 27 U/L — SIGNIFICANT CHANGE UP (ref 10–40)
BASOPHILS # BLD AUTO: 0.02 K/UL — SIGNIFICANT CHANGE UP (ref 0–0.2)
BASOPHILS NFR BLD AUTO: 0.2 % — SIGNIFICANT CHANGE UP (ref 0–2)
BILIRUB SERPL-MCNC: 0.3 MG/DL — SIGNIFICANT CHANGE UP (ref 0.2–1.2)
BUN SERPL-MCNC: 13 MG/DL — SIGNIFICANT CHANGE UP (ref 7–18)
CALCIUM SERPL-MCNC: 6.9 MG/DL — LOW (ref 8.4–10.5)
CHLORIDE SERPL-SCNC: 102 MMOL/L — SIGNIFICANT CHANGE UP (ref 96–108)
CO2 SERPL-SCNC: 28 MMOL/L — SIGNIFICANT CHANGE UP (ref 22–31)
CREAT SERPL-MCNC: 0.71 MG/DL — SIGNIFICANT CHANGE UP (ref 0.5–1.3)
DACRYOCYTES BLD QL SMEAR: SLIGHT — SIGNIFICANT CHANGE UP
EOSINOPHIL # BLD AUTO: 0.01 K/UL — SIGNIFICANT CHANGE UP (ref 0–0.5)
EOSINOPHIL NFR BLD AUTO: 0.1 % — SIGNIFICANT CHANGE UP (ref 0–6)
GLUCOSE SERPL-MCNC: 90 MG/DL — SIGNIFICANT CHANGE UP (ref 70–99)
HCT VFR BLD CALC: 28.8 % — LOW (ref 34.5–45)
HGB BLD-MCNC: 9.5 G/DL — LOW (ref 11.5–15.5)
HYPOCHROMIA BLD QL: SIGNIFICANT CHANGE UP
IMM GRANULOCYTES NFR BLD AUTO: 0.2 % — SIGNIFICANT CHANGE UP (ref 0–1.5)
INR BLD: 1.54 RATIO — HIGH (ref 0.88–1.16)
LACTATE SERPL-SCNC: 2.7 MMOL/L — HIGH (ref 0.7–2)
LYMPHOCYTES # BLD AUTO: 2.02 K/UL — SIGNIFICANT CHANGE UP (ref 1–3.3)
LYMPHOCYTES # BLD AUTO: 24.9 % — SIGNIFICANT CHANGE UP (ref 13–44)
MACROCYTES BLD QL: SLIGHT — SIGNIFICANT CHANGE UP
MAGNESIUM SERPL-MCNC: 1.5 MG/DL — LOW (ref 1.6–2.6)
MANUAL SMEAR VERIFICATION: SIGNIFICANT CHANGE UP
MCHC RBC-ENTMCNC: 27.3 PG — SIGNIFICANT CHANGE UP (ref 27–34)
MCHC RBC-ENTMCNC: 33 GM/DL — SIGNIFICANT CHANGE UP (ref 32–36)
MCV RBC AUTO: 82.8 FL — SIGNIFICANT CHANGE UP (ref 80–100)
MONOCYTES # BLD AUTO: 0.6 K/UL — SIGNIFICANT CHANGE UP (ref 0–0.9)
MONOCYTES NFR BLD AUTO: 7.4 % — SIGNIFICANT CHANGE UP (ref 2–14)
NEUTROPHILS # BLD AUTO: 5.45 K/UL — SIGNIFICANT CHANGE UP (ref 1.8–7.4)
NEUTROPHILS NFR BLD AUTO: 67.2 % — SIGNIFICANT CHANGE UP (ref 43–77)
NRBC # BLD: 0 /100 WBCS — SIGNIFICANT CHANGE UP (ref 0–0)
NT-PROBNP SERPL-SCNC: 174 PG/ML — HIGH (ref 0–125)
OB PNL STL: POSITIVE
PHOSPHATE SERPL-MCNC: 2.4 MG/DL — LOW (ref 2.5–4.5)
PLAT MORPH BLD: NORMAL — SIGNIFICANT CHANGE UP
PLATELET # BLD AUTO: 269 K/UL — SIGNIFICANT CHANGE UP (ref 150–400)
POIKILOCYTOSIS BLD QL AUTO: SLIGHT — SIGNIFICANT CHANGE UP
POLYCHROMASIA BLD QL SMEAR: SLIGHT — SIGNIFICANT CHANGE UP
POTASSIUM SERPL-MCNC: 3.6 MMOL/L — SIGNIFICANT CHANGE UP (ref 3.5–5.3)
POTASSIUM SERPL-SCNC: 3.6 MMOL/L — SIGNIFICANT CHANGE UP (ref 3.5–5.3)
PROT SERPL-MCNC: 3.7 G/DL — LOW (ref 6–8.3)
PROTHROM AB SERPL-ACNC: 17.3 SEC — HIGH (ref 10–12.9)
RBC # BLD: 3.48 M/UL — LOW (ref 3.8–5.2)
RBC # FLD: 22 % — HIGH (ref 10.3–14.5)
RBC BLD AUTO: ABNORMAL
SODIUM SERPL-SCNC: 136 MMOL/L — SIGNIFICANT CHANGE UP (ref 135–145)
TARGETS BLD QL SMEAR: SLIGHT — SIGNIFICANT CHANGE UP
TROPONIN I SERPL-MCNC: <0.015 NG/ML — SIGNIFICANT CHANGE UP (ref 0–0.04)
WBC # BLD: 8.12 K/UL — SIGNIFICANT CHANGE UP (ref 3.8–10.5)
WBC # FLD AUTO: 8.12 K/UL — SIGNIFICANT CHANGE UP (ref 3.8–10.5)

## 2019-08-18 PROCEDURE — 71045 X-RAY EXAM CHEST 1 VIEW: CPT | Mod: 26

## 2019-08-18 PROCEDURE — 99285 EMERGENCY DEPT VISIT HI MDM: CPT

## 2019-08-18 RX ORDER — MAGNESIUM SULFATE 500 MG/ML
2 VIAL (ML) INJECTION ONCE
Refills: 0 | Status: COMPLETED | OUTPATIENT
Start: 2019-08-18 | End: 2019-08-18

## 2019-08-18 RX ADMIN — Medication 50 GRAM(S): at 22:09

## 2019-08-18 NOTE — ED ADULT NURSE NOTE - OBJECTIVE STATEMENT
pt came in for c/o christel swelling to arms & legs with abd distention. Pt states she has had this before & is on lasix for fluid retention. Pt denies any pain, cp, sob, cough. Breathing unlabored.

## 2019-08-18 NOTE — ED ADULT NURSE NOTE - CHPI ED NUR SYMPTOMS NEG
no hematuria/no vomiting/no burning urination/no chills/no diarrhea/no nausea/no dysuria/no fever/no blood in stool

## 2019-08-18 NOTE — ED PROVIDER NOTE - MUSCULOSKELETAL, MLM
Spine appears normal, range of motion is not limited, no muscle or joint tenderness, discoloration on arm/legs, no calves tenderness

## 2019-08-18 NOTE — ED PROVIDER NOTE - OBJECTIVE STATEMENT
64 y.o. female c/o generalized edema for 2-3 weeks, abd distension, last BM today, noted small amt of blood on toilet paper on & off, decreased appetite, no fever, dysuria, n/v, abd pain, sob, CP, pt is currently on diuretic, @ times with leg weakness

## 2019-08-18 NOTE — ED ADULT NURSE NOTE - JUGULAR VENOUS DISTENTION
After Visit Summary   9/25/2018    Kendrick Castellon    MRN: [id number]           Patient Information     Date Of Birth          1950        Visit Information        Provider Department      9/25/2018 11:00 AM RG FLU SHOT [place] Amanda Mahoney        Today's Diagnoses     Need for prophylactic vaccination and inoculation against influenza    -  1       Follow-ups after your visit        Who to contact     If you have questions or need follow up information about today's clinic visit or your schedule please contact [facility] directly at [phone].  Normal or non-critical lab and imaging results will be communicated to you by [employer] ([employer]hart, letter or phone within 4 business days after the clinic has received the results. If you do not hear from us within 7 days, please contact the clinic through [place] or phone. If you have a critical or abnormal lab result, we will notify you by phone as soon as possible.  Submit refill requests through [employer] or call your pharmacy and they will forward the refill request to us. Please allow 3 business days for your refill to be completed.          Additional Information About Your Visit        MyChart Information     [employer] gives you secure access to your electronic health record. If you see a primary care provider, you can also send messages to your care team and make appointments. If you have questions, please call your primary care clinic.  If you do not have a primary care provider, please call [phone] and they will assist you.        Care EveryWhere ID     This is your Care EveryWhere ID. This could be used by other organizations to access your [facility] [place] medical records  [id number]         Blood Pressure from Last 3 Encounters:   11/17/17 134/72   10/19/16 108/68   05/25/16 116/66    Weight from Last 3 Encounters:   11/17/17 196 lb 6.4 oz (89.1 kg)   10/19/16 219 lb 1.6 oz (99.4 kg)   05/25/16 225 lb 12.8 oz (102.4 kg)              We  Performed the Following     FLU VACCINE, INCREASED ANTIGEN, PRESV FREE, AGE 65+ [82507]     Vaccine Administration, Initial [64286]        Primary Care Provider Office Phone # Fax #    Carter Malone [phone] [address] [facility] [address]        Equal Access to Services     BERTRAND LAWSON : Hadii aad ku hadasho Soomaali, waaxda luqadaha, qaybta kaalmada adeegyada, waxay idiin hayaan adeeg kharash latonon . So [facility] [phone].    ATENCIÓN: Si habla español, tiene a edwards disposición servicios gratuitos de asistencia lingüística. Llame al [phone].    We comply with applicable federal civil rights laws and [place] laws. We do not discriminate on the basis of race, color, national origin, age, disability, sex, sexual orientation, or gender identity.            Thank you!     Thank you for choosing [facility]  for your care. Our goal is always to provide you with excellent care. Hearing back from our patients is one way we can continue to improve our services. Please take a few minutes to complete the written survey that you may receive in the mail after your visit with us. Thank you!             Your Updated Medication List - Protect others around you: Learn how to safely use, store and throw away your medicines at www.disposemymeds.org.          This list is accurate as of 9/25/18 11:28 AM.  Always use your most recent med list.                   Brand Name Dispense Instructions for use Diagnosis    aspirin 81 MG tablet      Take 1 tablet by mouth daily.        atorvastatin 80 MG tablet    LIPITOR    90 tablet    TAKE 1 TABLET(80 MG) BY MOUTH DAILY    Coronary artery disease involving native coronary artery of native heart without angina pectoris, Hyperlipidemia LDL goal <100       lisinopril 10 MG tablet    PRINIVIL/ZESTRIL    90 tablet    TAKE 1 TABLET(10 MG) BY MOUTH DAILY    CKD (chronic kidney disease) stage 2, GFR 60-89 ml/min, Essential hypertension with  goal blood pressure less than 130/80       metoprolol succinate 25 MG 24 hr tablet    TOPROL-XL    90 tablet    TAKE 1 TABLET(25 MG) BY MOUTH DAILY    Essential hypertension with goal blood pressure less than 130/80       PREVACID 15 MG CR capsule   Generic drug:  LANsoprazole     90 capsule    Take 1 capsule (15 mg) by mouth daily Take 30-60 minutes before a meal.        sildenafil 100 MG tablet    VIAGRA    36 tablet    Take 0.5-1 tablets ( mg) by mouth daily as needed Take 30 min to 4 hours before intercourse.  Never use with nitroglycerin, terazosin or doxazosin.    Erectile dysfunction, unspecified erectile dysfunction type          absent

## 2019-08-19 DIAGNOSIS — K56.609 UNSPECIFIED INTESTINAL OBSTRUCTION, UNSPECIFIED AS TO PARTIAL VERSUS COMPLETE OBSTRUCTION: ICD-10-CM

## 2019-08-19 DIAGNOSIS — R60.1 GENERALIZED EDEMA: ICD-10-CM

## 2019-08-19 DIAGNOSIS — D64.9 ANEMIA, UNSPECIFIED: ICD-10-CM

## 2019-08-19 DIAGNOSIS — I82.409 ACUTE EMBOLISM AND THROMBOSIS OF UNSPECIFIED DEEP VEINS OF UNSPECIFIED LOWER EXTREMITY: ICD-10-CM

## 2019-08-19 DIAGNOSIS — I26.99 OTHER PULMONARY EMBOLISM WITHOUT ACUTE COR PULMONALE: ICD-10-CM

## 2019-08-19 DIAGNOSIS — Z29.9 ENCOUNTER FOR PROPHYLACTIC MEASURES, UNSPECIFIED: ICD-10-CM

## 2019-08-19 LAB
ALBUMIN SERPL ELPH-MCNC: 1.3 G/DL — LOW (ref 3.5–5)
ALP SERPL-CCNC: 131 U/L — HIGH (ref 40–120)
ALT FLD-CCNC: 35 U/L DA — SIGNIFICANT CHANGE UP (ref 10–60)
ANION GAP SERPL CALC-SCNC: 4 MMOL/L — LOW (ref 5–17)
APPEARANCE UR: CLEAR — SIGNIFICANT CHANGE UP
AST SERPL-CCNC: 26 U/L — SIGNIFICANT CHANGE UP (ref 10–40)
BACTERIA # UR AUTO: ABNORMAL /HPF
BASOPHILS # BLD AUTO: 0.01 K/UL — SIGNIFICANT CHANGE UP (ref 0–0.2)
BASOPHILS NFR BLD AUTO: 0.2 % — SIGNIFICANT CHANGE UP (ref 0–2)
BILIRUB SERPL-MCNC: 0.5 MG/DL — SIGNIFICANT CHANGE UP (ref 0.2–1.2)
BILIRUB UR-MCNC: ABNORMAL
BUN SERPL-MCNC: 12 MG/DL — SIGNIFICANT CHANGE UP (ref 7–18)
CALCIUM SERPL-MCNC: 7.2 MG/DL — LOW (ref 8.4–10.5)
CHLORIDE SERPL-SCNC: 103 MMOL/L — SIGNIFICANT CHANGE UP (ref 96–108)
CHOLEST SERPL-MCNC: <50 MG/DL — SIGNIFICANT CHANGE UP (ref 10–199)
CO2 SERPL-SCNC: 29 MMOL/L — SIGNIFICANT CHANGE UP (ref 22–31)
COLOR SPEC: YELLOW — SIGNIFICANT CHANGE UP
COMMENT - URINE: SIGNIFICANT CHANGE UP
CREAT ?TM UR-MCNC: <13 MG/DL — SIGNIFICANT CHANGE UP
CREAT SERPL-MCNC: 0.62 MG/DL — SIGNIFICANT CHANGE UP (ref 0.5–1.3)
DIFF PNL FLD: NEGATIVE — SIGNIFICANT CHANGE UP
EOSINOPHIL # BLD AUTO: 0.02 K/UL — SIGNIFICANT CHANGE UP (ref 0–0.5)
EOSINOPHIL NFR BLD AUTO: 0.3 % — SIGNIFICANT CHANGE UP (ref 0–6)
EPI CELLS # UR: ABNORMAL /HPF
GLUCOSE BLDC GLUCOMTR-MCNC: 162 MG/DL — HIGH (ref 70–99)
GLUCOSE BLDC GLUCOMTR-MCNC: 69 MG/DL — LOW (ref 70–99)
GLUCOSE SERPL-MCNC: 90 MG/DL — SIGNIFICANT CHANGE UP (ref 70–99)
GLUCOSE UR QL: NEGATIVE — SIGNIFICANT CHANGE UP
HCT VFR BLD CALC: 30.4 % — LOW (ref 34.5–45)
HDLC SERPL-MCNC: 15 MG/DL — LOW
HGB BLD-MCNC: 10 G/DL — LOW (ref 11.5–15.5)
IMM GRANULOCYTES NFR BLD AUTO: 0.3 % — SIGNIFICANT CHANGE UP (ref 0–1.5)
INR BLD: 1.55 RATIO — HIGH (ref 0.88–1.16)
IRON SATN MFR SERPL: 59 UG/DL — SIGNIFICANT CHANGE UP (ref 40–150)
IRON SATN MFR SERPL: 69 % — HIGH (ref 15–50)
KETONES UR-MCNC: NEGATIVE — SIGNIFICANT CHANGE UP
LEUKOCYTE ESTERASE UR-ACNC: ABNORMAL
LIPID PNL WITH DIRECT LDL SERPL: <24 MG/DL — SIGNIFICANT CHANGE UP
LYMPHOCYTES # BLD AUTO: 1.58 K/UL — SIGNIFICANT CHANGE UP (ref 1–3.3)
LYMPHOCYTES # BLD AUTO: 26.9 % — SIGNIFICANT CHANGE UP (ref 13–44)
MAGNESIUM SERPL-MCNC: 1.7 MG/DL — SIGNIFICANT CHANGE UP (ref 1.6–2.6)
MCHC RBC-ENTMCNC: 27.5 PG — SIGNIFICANT CHANGE UP (ref 27–34)
MCHC RBC-ENTMCNC: 32.9 GM/DL — SIGNIFICANT CHANGE UP (ref 32–36)
MCV RBC AUTO: 83.7 FL — SIGNIFICANT CHANGE UP (ref 80–100)
MICROCYTES BLD QL: SLIGHT — SIGNIFICANT CHANGE UP
MONOCYTES # BLD AUTO: 0.36 K/UL — SIGNIFICANT CHANGE UP (ref 0–0.9)
MONOCYTES NFR BLD AUTO: 6.1 % — SIGNIFICANT CHANGE UP (ref 2–14)
NEUTROPHILS # BLD AUTO: 3.89 K/UL — SIGNIFICANT CHANGE UP (ref 1.8–7.4)
NEUTROPHILS NFR BLD AUTO: 66.2 % — SIGNIFICANT CHANGE UP (ref 43–77)
NITRITE UR-MCNC: NEGATIVE — SIGNIFICANT CHANGE UP
NRBC # BLD: 0 /100 WBCS — SIGNIFICANT CHANGE UP (ref 0–0)
PH UR: 6 — SIGNIFICANT CHANGE UP (ref 5–8)
PHOSPHATE SERPL-MCNC: 2.6 MG/DL — SIGNIFICANT CHANGE UP (ref 2.5–4.5)
PLATELET # BLD AUTO: 266 K/UL — SIGNIFICANT CHANGE UP (ref 150–400)
POTASSIUM SERPL-MCNC: 3.4 MMOL/L — LOW (ref 3.5–5.3)
POTASSIUM SERPL-SCNC: 3.4 MMOL/L — LOW (ref 3.5–5.3)
PROT ?TM UR-MCNC: <5 MG/DL — SIGNIFICANT CHANGE UP (ref 0–12)
PROT SERPL-MCNC: 4 G/DL — LOW (ref 6–8.3)
PROT UR-MCNC: 15
PROTHROM AB SERPL-ACNC: 17.4 SEC — HIGH (ref 10–12.9)
RBC # BLD: 3.63 M/UL — LOW (ref 3.8–5.2)
RBC # FLD: 22.4 % — HIGH (ref 10.3–14.5)
RBC CASTS # UR COMP ASSIST: SIGNIFICANT CHANGE UP /HPF (ref 0–2)
SODIUM SERPL-SCNC: 136 MMOL/L — SIGNIFICANT CHANGE UP (ref 135–145)
SP GR SPEC: 1.01 — SIGNIFICANT CHANGE UP (ref 1.01–1.02)
TIBC SERPL-MCNC: 86 UG/DL — LOW (ref 250–450)
TOTAL CHOLESTEROL/HDL RATIO MEASUREMENT: <3.3 RATIO — SIGNIFICANT CHANGE UP (ref 3.3–7.1)
TRIGL SERPL-MCNC: 56 MG/DL — SIGNIFICANT CHANGE UP (ref 10–149)
TSH SERPL-MCNC: 2.05 UU/ML — SIGNIFICANT CHANGE UP (ref 0.34–4.82)
UIBC SERPL-MCNC: 27 UG/DL — LOW (ref 110–370)
UROBILINOGEN FLD QL: 1
WBC # BLD: 5.88 K/UL — SIGNIFICANT CHANGE UP (ref 3.8–10.5)
WBC # FLD AUTO: 5.88 K/UL — SIGNIFICANT CHANGE UP (ref 3.8–10.5)
WBC UR QL: ABNORMAL /HPF (ref 0–5)

## 2019-08-19 PROCEDURE — 76705 ECHO EXAM OF ABDOMEN: CPT | Mod: 26

## 2019-08-19 PROCEDURE — 74018 RADEX ABDOMEN 1 VIEW: CPT | Mod: 26

## 2019-08-19 RX ORDER — FUROSEMIDE 40 MG
20 TABLET ORAL
Refills: 0 | Status: DISCONTINUED | OUTPATIENT
Start: 2019-08-19 | End: 2019-08-22

## 2019-08-19 RX ORDER — ALBUMIN HUMAN 25 %
50 VIAL (ML) INTRAVENOUS EVERY 4 HOURS
Refills: 0 | Status: COMPLETED | OUTPATIENT
Start: 2019-08-19 | End: 2019-08-19

## 2019-08-19 RX ORDER — LINACLOTIDE 145 UG/1
1 CAPSULE, GELATIN COATED ORAL
Qty: 0 | Refills: 0 | DISCHARGE

## 2019-08-19 RX ORDER — SPIRONOLACTONE 25 MG/1
25 TABLET, FILM COATED ORAL DAILY
Refills: 0 | Status: DISCONTINUED | OUTPATIENT
Start: 2019-08-19 | End: 2019-08-24

## 2019-08-19 RX ORDER — RIVAROXABAN 15 MG-20MG
15 KIT ORAL DAILY
Refills: 0 | Status: DISCONTINUED | OUTPATIENT
Start: 2019-08-19 | End: 2019-08-24

## 2019-08-19 RX ORDER — SENNA PLUS 8.6 MG/1
2 TABLET ORAL AT BEDTIME
Refills: 0 | Status: DISCONTINUED | OUTPATIENT
Start: 2019-08-19 | End: 2019-08-24

## 2019-08-19 RX ORDER — POTASSIUM CHLORIDE 20 MEQ
40 PACKET (EA) ORAL ONCE
Refills: 0 | Status: COMPLETED | OUTPATIENT
Start: 2019-08-19 | End: 2019-08-19

## 2019-08-19 RX ORDER — DOCUSATE SODIUM 100 MG
100 CAPSULE ORAL
Refills: 0 | Status: DISCONTINUED | OUTPATIENT
Start: 2019-08-19 | End: 2019-08-24

## 2019-08-19 RX ORDER — MIRTAZAPINE 45 MG/1
7.5 TABLET, ORALLY DISINTEGRATING ORAL AT BEDTIME
Refills: 0 | Status: DISCONTINUED | OUTPATIENT
Start: 2019-08-19 | End: 2019-08-24

## 2019-08-19 RX ORDER — FUROSEMIDE 40 MG
40 TABLET ORAL
Refills: 0 | Status: DISCONTINUED | OUTPATIENT
Start: 2019-08-19 | End: 2019-08-19

## 2019-08-19 RX ADMIN — Medication 40 MILLIEQUIVALENT(S): at 12:57

## 2019-08-19 RX ADMIN — SPIRONOLACTONE 25 MILLIGRAM(S): 25 TABLET, FILM COATED ORAL at 12:58

## 2019-08-19 RX ADMIN — Medication 20 MILLIGRAM(S): at 18:03

## 2019-08-19 RX ADMIN — Medication 50 MILLILITER(S): at 22:45

## 2019-08-19 RX ADMIN — Medication 50 MILLILITER(S): at 04:37

## 2019-08-19 RX ADMIN — Medication 100 MILLIGRAM(S): at 18:04

## 2019-08-19 RX ADMIN — RIVAROXABAN 15 MILLIGRAM(S): KIT at 12:58

## 2019-08-19 RX ADMIN — Medication 50 MILLILITER(S): at 18:02

## 2019-08-19 RX ADMIN — MIRTAZAPINE 7.5 MILLIGRAM(S): 45 TABLET, ORALLY DISINTEGRATING ORAL at 22:45

## 2019-08-19 RX ADMIN — Medication 50 MILLILITER(S): at 10:02

## 2019-08-19 RX ADMIN — Medication 50 MILLILITER(S): at 14:08

## 2019-08-19 RX ADMIN — SENNA PLUS 2 TABLET(S): 8.6 TABLET ORAL at 22:45

## 2019-08-19 NOTE — H&P ADULT - NSHPPHYSICALEXAM_GEN_ALL_CORE
CONSTITUTIONAL: Well appearing, well nourished, awake, alert and in no apparent distress  ENMT: Airway patent, Nasal mucosa clear. Mouth with normal mucosa. Throat has no vesicles, no oropharyngeal exudates and uvula is midline.  EYES: Clear bilaterally, pupils equal, round and reactive to light. EOMI.  CARDIAC: Normal rate, regular rhythm.  Heart sounds S1, S2.  No murmurs, rubs or gallops   RESPIRATORY: Breath sounds clear and equal bilaterally. No wheezes, rhales or rhonchi  MUSCULOSKELETAL: Spine appears normal, range of motion is not limited, no muscle or joint tenderness  EXTREMITIES: b/ edema, thickening of skin   NEUROLOGICAL: Alert and oriented, no focal deficits, no motor or sensory deficits.  SKIN: chronic venous stasis   ABDOMEN; soft, distended, non tender, positive  fluid thrill,

## 2019-08-19 NOTE — H&P ADULT - NSICDXPASTMEDICALHX_GEN_ALL_CORE_FT
PAST MEDICAL HISTORY:  DVT (deep venous thrombosis)     Pulmonary embolism     SBO (small bowel obstruction)

## 2019-08-19 NOTE — H&P ADULT - ASSESSMENT
64 Female from home with PMH of Recurrent SBO's, s/p exp lap, SB resection,  DVT ( Left UE , then Left LE) , PE , chronic leg swelling ,anasarca  comes in with complaint of generalized body swelling.      Patient is admitted for generalized anasarca. 64 Female from home with PMH of Recurrent SBO's, s/p exp lap, SB resection,  DVT ( Left UE , then Left LE) , PE , chronic leg swelling ,anasarca  comes in with complaint of generalized body swelling.      Patient is admitted for generalized anasarca.     Patient seen by Dr Ross in ED.

## 2019-08-19 NOTE — H&P ADULT - PROBLEM SELECTOR PLAN 5
-Patient has hx of SBO   -No Nausea/vomiting, abdominal pain, constipation at present   -Follow abdominal X-ray   -Will keep NPO for now

## 2019-08-19 NOTE — H&P ADULT - HISTORY OF PRESENT ILLNESS
64 Female from home with PMH of Recurrent SBO's, s/p exp lap, SB resection,  DVT ( Left UE , then Left LE) , PE , chronic leg swelling ,anasarca  comes in with complaint of generalized body swelling. Patient states that she has been admitted to [facility] for similar complaint She used to take water pills twice daily which was switched to once daily by her PCP. Later, she started retaining fluid again. She went to see Dr Rivera, hematologist/oncologist who advised her to restart  taking water pills as before however, she has still not noticed any improvement in her symptoms. She reports that when her abdominal gets distended she feel full and loses her appetite. She denies any abdominal pain, no shortness of breath , no nausea/vomiting, diarrhea, last BM was this morning. She feels tenderness in her feet b/l. All other ROS negative   She also reports that she has been noticing some blood on tissue paper after wiping which has been on going for a while,  no jody blood mixed in stools.     OFF note; She was admitted to [facility] in February'2019 extensive work up was done Ascitic fluid was tapped; SAAG 0.8 ( <1 ) with total protein <2.5 , consistent with tuberculous/ nephrotic. Her anasarca was likely secondary to hypoalbuminemia, no other etiology explaining loss of albumin was identified. CT scan showed SBO, surgery and GI were following.

## 2019-08-19 NOTE — H&P ADULT - PROBLEM SELECTOR PLAN 6
IMPROVE VTE score:  [ ] Previous VTE                                                3  [ ] Thrombophilia                                             2  [ ] Lower limb paralysis                                  2        (unable to hold up >15 seconds)    [ ] Current Cancer (within 6 months)            2   x[] Immobilization > 24 hrs                              1  [ ] ICU/CCU stay > 24 hours                            1  [x] Age > 60                                                         1  IMPROVE 2   heparin SC

## 2019-08-19 NOTE — H&P ADULT - PROBLEM SELECTOR PLAN 1
-Patient is noted to have generalized anasarca likely secondary to hypoalbuminemia   -Albumin levels; 1  -No signs of overload secondary to Heart issues, ECHO, Feb'2019 : EF = 60 to  65%. Normal diastolic function.  -No signs of malignancy on previous CT scan in February   -UA- proteinuria, could be an underlying nephropathy   -Follow  urine PCR   -Start IV Lasix and aldactone under albumin cover   -Follow US abdomen , may need ascitic tap.   -Heme/onc consult  -GI consult-   -Nephro consult- -Patient is noted to have generalized anasarca likely secondary to hypoalbuminemia   -Albumin levels; 1  -No signs of overload secondary to Heart issues, ECHO, Feb'2019 : EF = 60 to  65%. Normal diastolic function.  -No signs of malignancy on previous CT scan in February   -UA- proteinuria, could be an underlying nephropathy   -Follow  urine PCR   -Start IV Lasix and aldactone under albumin cover   -Follow US abdomen , may need ascitic tap.   -GI consult- Dr Chatterjee   -Nephro consult- Dr Barnett

## 2019-08-20 LAB
ALBUMIN SERPL ELPH-MCNC: 1.7 G/DL — LOW (ref 3.5–5)
ALP SERPL-CCNC: 101 U/L — SIGNIFICANT CHANGE UP (ref 40–120)
ALT FLD-CCNC: 28 U/L DA — SIGNIFICANT CHANGE UP (ref 10–60)
ANION GAP SERPL CALC-SCNC: 8 MMOL/L — SIGNIFICANT CHANGE UP (ref 5–17)
AST SERPL-CCNC: 24 U/L — SIGNIFICANT CHANGE UP (ref 10–40)
BASOPHILS # BLD AUTO: 0.03 K/UL — SIGNIFICANT CHANGE UP (ref 0–0.2)
BASOPHILS NFR BLD AUTO: 0.7 % — SIGNIFICANT CHANGE UP (ref 0–2)
BILIRUB SERPL-MCNC: 0.5 MG/DL — SIGNIFICANT CHANGE UP (ref 0.2–1.2)
BUN SERPL-MCNC: 9 MG/DL — SIGNIFICANT CHANGE UP (ref 7–18)
CALCIUM SERPL-MCNC: 7.5 MG/DL — LOW (ref 8.4–10.5)
CHLORIDE SERPL-SCNC: 107 MMOL/L — SIGNIFICANT CHANGE UP (ref 96–108)
CO2 SERPL-SCNC: 28 MMOL/L — SIGNIFICANT CHANGE UP (ref 22–31)
CREAT SERPL-MCNC: 0.62 MG/DL — SIGNIFICANT CHANGE UP (ref 0.5–1.3)
CULTURE RESULTS: SIGNIFICANT CHANGE UP
EOSINOPHIL # BLD AUTO: 0.02 K/UL — SIGNIFICANT CHANGE UP (ref 0–0.5)
EOSINOPHIL NFR BLD AUTO: 0.5 % — SIGNIFICANT CHANGE UP (ref 0–6)
FERRITIN SERPL-MCNC: 66 NG/ML — SIGNIFICANT CHANGE UP (ref 15–150)
FOLATE SERPL-MCNC: 18.2 NG/ML — SIGNIFICANT CHANGE UP
GLUCOSE SERPL-MCNC: 65 MG/DL — LOW (ref 70–99)
HBA1C BLD-MCNC: 4.2 % — SIGNIFICANT CHANGE UP (ref 4–5.6)
HCT VFR BLD CALC: 27.1 % — LOW (ref 34.5–45)
HGB BLD-MCNC: 9.1 G/DL — LOW (ref 11.5–15.5)
IMM GRANULOCYTES NFR BLD AUTO: 0.5 % — SIGNIFICANT CHANGE UP (ref 0–1.5)
LYMPHOCYTES # BLD AUTO: 1.21 K/UL — SIGNIFICANT CHANGE UP (ref 1–3.3)
LYMPHOCYTES # BLD AUTO: 28.9 % — SIGNIFICANT CHANGE UP (ref 13–44)
MCHC RBC-ENTMCNC: 28.1 PG — SIGNIFICANT CHANGE UP (ref 27–34)
MCHC RBC-ENTMCNC: 33.6 GM/DL — SIGNIFICANT CHANGE UP (ref 32–36)
MCV RBC AUTO: 83.6 FL — SIGNIFICANT CHANGE UP (ref 80–100)
MONOCYTES # BLD AUTO: 0.36 K/UL — SIGNIFICANT CHANGE UP (ref 0–0.9)
MONOCYTES NFR BLD AUTO: 8.6 % — SIGNIFICANT CHANGE UP (ref 2–14)
NEUTROPHILS # BLD AUTO: 2.54 K/UL — SIGNIFICANT CHANGE UP (ref 1.8–7.4)
NEUTROPHILS NFR BLD AUTO: 60.8 % — SIGNIFICANT CHANGE UP (ref 43–77)
NRBC # BLD: 0 /100 WBCS — SIGNIFICANT CHANGE UP (ref 0–0)
PLATELET # BLD AUTO: 254 K/UL — SIGNIFICANT CHANGE UP (ref 150–400)
POTASSIUM SERPL-MCNC: 3.7 MMOL/L — SIGNIFICANT CHANGE UP (ref 3.5–5.3)
POTASSIUM SERPL-SCNC: 3.7 MMOL/L — SIGNIFICANT CHANGE UP (ref 3.5–5.3)
PROT SERPL-MCNC: 4.1 G/DL — LOW (ref 6–8.3)
RBC # BLD: 3.24 M/UL — LOW (ref 3.8–5.2)
RBC # FLD: 22.8 % — HIGH (ref 10.3–14.5)
SODIUM SERPL-SCNC: 143 MMOL/L — SIGNIFICANT CHANGE UP (ref 135–145)
SPECIMEN SOURCE: SIGNIFICANT CHANGE UP
VIT B12 SERPL-MCNC: >2000 PG/ML — HIGH (ref 232–1245)
WBC # BLD: 4.18 K/UL — SIGNIFICANT CHANGE UP (ref 3.8–10.5)
WBC # FLD AUTO: 4.18 K/UL — SIGNIFICANT CHANGE UP (ref 3.8–10.5)

## 2019-08-20 RX ORDER — ALBUMIN HUMAN 25 %
100 VIAL (ML) INTRAVENOUS EVERY 4 HOURS
Refills: 0 | Status: COMPLETED | OUTPATIENT
Start: 2019-08-20 | End: 2019-08-20

## 2019-08-20 RX ADMIN — Medication 100 MILLIGRAM(S): at 17:39

## 2019-08-20 RX ADMIN — RIVAROXABAN 15 MILLIGRAM(S): KIT at 11:52

## 2019-08-20 RX ADMIN — SENNA PLUS 2 TABLET(S): 8.6 TABLET ORAL at 22:06

## 2019-08-20 RX ADMIN — MIRTAZAPINE 7.5 MILLIGRAM(S): 45 TABLET, ORALLY DISINTEGRATING ORAL at 22:06

## 2019-08-20 RX ADMIN — Medication 100 MILLIGRAM(S): at 06:26

## 2019-08-20 RX ADMIN — Medication 50 MILLILITER(S): at 22:06

## 2019-08-20 RX ADMIN — Medication 50 MILLILITER(S): at 18:27

## 2019-08-20 RX ADMIN — SPIRONOLACTONE 25 MILLIGRAM(S): 25 TABLET, FILM COATED ORAL at 06:26

## 2019-08-20 NOTE — PROGRESS NOTE ADULT - ASSESSMENT
64 Female from home with PMH of Recurrent SBO's, s/p exp lap, SB resection,  DVT ( Left UE , then Left LE) , PE , chronic leg swelling ,anasarca  comes in with complaint of generalized body swelling.    Patient is admitted for generalized anasarca.

## 2019-08-20 NOTE — PROGRESS NOTE ADULT - PROBLEM SELECTOR PLAN 5
-Patient has hx of SBO   -No Nausea/vomiting, abdominal pain, constipation at present   -on regular diet now, adding ensure

## 2019-08-20 NOTE — PROGRESS NOTE ADULT - PROBLEM SELECTOR PLAN 2
-patient is noted to have anemia   -Stool occult positive   -ferrtin, folate pending  -total iron wnl  -f/u GI consult

## 2019-08-20 NOTE — PROGRESS NOTE ADULT - PROBLEM SELECTOR PLAN 6
IMPROVE VTE score:  [ ] Previous VTE                                                3  [ ] Thrombophilia                                             2  [ ] Lower limb paralysis                                  2        (unable to hold up >15 seconds)    [ ] Current Cancer (within 6 months)            2   x[] Immobilization > 24 hrs                              1  [ ] ICU/CCU stay > 24 hours                            1  [x] Age > 60                                                         1  IMPROVE 2   on xarelto

## 2019-08-20 NOTE — PROGRESS NOTE ADULT - PROBLEM SELECTOR PLAN 1
-Patient is noted to have generalized anasarca likely secondary to hypoalbuminemia   -Albumin levels: 1  -No signs of overload secondary to Heart issues, ECHO, Feb'2019 : EF = 60 to  65%. Normal diastolic function.  -No signs of malignancy on previous CT scan in February   -UA- proteinuria, could be an underlying nephropathy   -Follow  urine PCR   -Continue IV Lasix and aldactone under albumin cover   -US abdomen showing ascites in all 4 quadrants   -f/u GI consult-Dr Chatterjee   Albumin now 1.7 after given albumin 25% IV  swelling in hands and feet seem to have improved slightly since yesterday  blood cultures negative

## 2019-08-20 NOTE — PROGRESS NOTE ADULT - SUBJECTIVE AND OBJECTIVE BOX
PGY 1 Note discussed with supervising resident and primary attending    Patient is a 64y old  Female who presents with a chief complaint of generalized body swelling.    INTERVAL HPI/OVERNIGHT EVENTS: Patient seen and examined at the bedside. Patient states that the swelling is mildly improved in her hands and feet. No acute events overnight. Denies any pain currently. Albumin has improved slight from 1.0 to 1.7.     MEDICATIONS  (STANDING):  docusate sodium 100 milliGRAM(s) Oral two times a day  furosemide   Injectable 20 milliGRAM(s) IV Push two times a day  mirtazapine 7.5 milliGRAM(s) Oral at bedtime  rivaroxaban 15 milliGRAM(s) Oral daily  senna 2 Tablet(s) Oral at bedtime  spironolactone 25 milliGRAM(s) Oral daily    _______________________  REVIEW OF SYSTEMS:    CONSTITUTIONAL: No fever   EYES: no visual changes or eye pain  NECK: No pain   RESPIRATORY: No cough; No shortness of breath  CARDIOVASCULAR: No chest pain  GASTROINTESTINAL: No abdominal pain. No nausea or vomiting  NEUROLOGICAL: No headache  MUSCULOSKELETAL: No joint pain, no muscle pain  GENITOURINARY: no dysuria  PSYCHIATRY: no depression  ALL OTHER  ROS negative        Vital Signs Last 24 Hrs  T(C): 36.2 (20 Aug 2019 05:27), Max: 36.6 (19 Aug 2019 13:59)  T(F): 97.2 (20 Aug 2019 05:27), Max: 97.8 (19 Aug 2019 13:59)  HR: 66 (20 Aug 2019 05:27) (66 - 81)  BP: 96/63 (20 Aug 2019 05:27) (96/63 - 111/60)  RR: 14 (20 Aug 2019 05:27) (14 - 17)  SpO2: 100% (20 Aug 2019 05:27) (100% - 100%)    ________________________________________________  PHYSICAL EXAM:  GENERAL: Patient is seen resting in bed and does not appear to be in any distress  HEENT: Normocephalic; conjunctivae and sclerae clear   NECK: supple  CHEST/LUNG: Clear to auscultation bilaterally    HEART: S1 S2, regular; no murmurs  ABDOMEN: Soft, Nontender, Distended due to fluid; Bowel sounds present  EXTREMITIES: no edema; no calf tenderness; mild swelling noted in hands and feet improved from yesterday  SKIN: warm and dry  NERVOUS SYSTEM: Awake and alert; Oriented to place, person and time     _________________________________________________  LABS:                        9.1    4.18  )-----------( 254      ( 20 Aug 2019 08:44 )             27.1     -    143  |  107  |  9   ----------------------------<  65<L>  3.7   |  28  |  0.62    Ca    7.5<L>      20 Aug 2019 08:44  Phos  2.6       Mg     1.7         TPro  4.1<L>  /  Alb  1.7<L>  /  TBili  0.5  /  DBili  x   /  AST  24  /  ALT  28  /  AlkPhos  101  08-20    PT/INR - ( 19 Aug 2019 10:15 )   PT: 17.4 sec;   INR: 1.55 ratio         PTT - ( 18 Aug 2019 19:39 )  PTT:34.1 sec  Urinalysis Basic - ( 19 Aug 2019 00:41 )    Color: Yellow / Appearance: Clear / S.015 / pH: x  Gluc: x / Ketone: Negative  / Bili: Small / Urobili: 1   Blood: x / Protein: 15 / Nitrite: Negative   Leuk Esterase: Moderate / RBC: 0-2 /HPF / WBC 6-10 /HPF   Sq Epi: x / Non Sq Epi: Moderate /HPF / Bacteria: Few /HPF      CAPILLARY BLOOD GLUCOSE      POCT Blood Glucose.: 162 mg/dL (19 Aug 2019 12:07)      RADIOLOGY & ADDITIONAL TESTS:    Imaging Personally Reviewed:  YES    < from: US Abdomen Limited (19 @ 10:18) >  Impression: Ascites as above.    < end of copied text >    < from: Xray Abdomen 1 View PORTABLE -Urgent (19 @ 02:29) >  IMPRESSION:  The visualized bowel loops appear centrally located, likely   related to intraperitoneal ascites.   There are air-filled segments of bowel, likely small bowel, identified   within the central abdomen and mechanical bowel obstruction cannot be   excluded. No gross free intraperitoneal air is identified. CT evaluation   can be performed for further assessment.    < end of copied text >    Consultant(s) Notes Reviewed:   YES    Care Discussed with Consultants :     Plan of care was discussed with patient and /or primary care giver; all questions and concerns were addressed and care was aligned with patient's wishes.

## 2019-08-21 LAB
ALBUMIN SERPL ELPH-MCNC: 2.1 G/DL — LOW (ref 3.5–5)
ALP SERPL-CCNC: 103 U/L — SIGNIFICANT CHANGE UP (ref 40–120)
ALT FLD-CCNC: 31 U/L DA — SIGNIFICANT CHANGE UP (ref 10–60)
ANION GAP SERPL CALC-SCNC: 6 MMOL/L — SIGNIFICANT CHANGE UP (ref 5–17)
AST SERPL-CCNC: 21 U/L — SIGNIFICANT CHANGE UP (ref 10–40)
BASOPHILS # BLD AUTO: 0.04 K/UL — SIGNIFICANT CHANGE UP (ref 0–0.2)
BASOPHILS NFR BLD AUTO: 0.8 % — SIGNIFICANT CHANGE UP (ref 0–2)
BILIRUB SERPL-MCNC: 0.5 MG/DL — SIGNIFICANT CHANGE UP (ref 0.2–1.2)
BUN SERPL-MCNC: 9 MG/DL — SIGNIFICANT CHANGE UP (ref 7–18)
CALCIUM SERPL-MCNC: 8.1 MG/DL — LOW (ref 8.4–10.5)
CHLORIDE SERPL-SCNC: 107 MMOL/L — SIGNIFICANT CHANGE UP (ref 96–108)
CO2 SERPL-SCNC: 30 MMOL/L — SIGNIFICANT CHANGE UP (ref 22–31)
CREAT SERPL-MCNC: 0.74 MG/DL — SIGNIFICANT CHANGE UP (ref 0.5–1.3)
EOSINOPHIL # BLD AUTO: 0.02 K/UL — SIGNIFICANT CHANGE UP (ref 0–0.5)
EOSINOPHIL NFR BLD AUTO: 0.4 % — SIGNIFICANT CHANGE UP (ref 0–6)
GLUCOSE BLDC GLUCOMTR-MCNC: 79 MG/DL — SIGNIFICANT CHANGE UP (ref 70–99)
GLUCOSE SERPL-MCNC: 74 MG/DL — SIGNIFICANT CHANGE UP (ref 70–99)
HCT VFR BLD CALC: 28.6 % — LOW (ref 34.5–45)
HGB BLD-MCNC: 9.3 G/DL — LOW (ref 11.5–15.5)
IMM GRANULOCYTES NFR BLD AUTO: 0.4 % — SIGNIFICANT CHANGE UP (ref 0–1.5)
LYMPHOCYTES # BLD AUTO: 1.59 K/UL — SIGNIFICANT CHANGE UP (ref 1–3.3)
LYMPHOCYTES # BLD AUTO: 30.3 % — SIGNIFICANT CHANGE UP (ref 13–44)
MCHC RBC-ENTMCNC: 27.6 PG — SIGNIFICANT CHANGE UP (ref 27–34)
MCHC RBC-ENTMCNC: 32.5 GM/DL — SIGNIFICANT CHANGE UP (ref 32–36)
MCV RBC AUTO: 84.9 FL — SIGNIFICANT CHANGE UP (ref 80–100)
MONOCYTES # BLD AUTO: 0.5 K/UL — SIGNIFICANT CHANGE UP (ref 0–0.9)
MONOCYTES NFR BLD AUTO: 9.5 % — SIGNIFICANT CHANGE UP (ref 2–14)
NEUTROPHILS # BLD AUTO: 3.07 K/UL — SIGNIFICANT CHANGE UP (ref 1.8–7.4)
NEUTROPHILS NFR BLD AUTO: 58.6 % — SIGNIFICANT CHANGE UP (ref 43–77)
NRBC # BLD: 0 /100 WBCS — SIGNIFICANT CHANGE UP (ref 0–0)
PLATELET # BLD AUTO: 257 K/UL — SIGNIFICANT CHANGE UP (ref 150–400)
POTASSIUM SERPL-MCNC: 3.7 MMOL/L — SIGNIFICANT CHANGE UP (ref 3.5–5.3)
POTASSIUM SERPL-SCNC: 3.7 MMOL/L — SIGNIFICANT CHANGE UP (ref 3.5–5.3)
PROT SERPL-MCNC: 4.4 G/DL — LOW (ref 6–8.3)
RBC # BLD: 3.37 M/UL — LOW (ref 3.8–5.2)
RBC # FLD: 23.3 % — HIGH (ref 10.3–14.5)
SODIUM SERPL-SCNC: 143 MMOL/L — SIGNIFICANT CHANGE UP (ref 135–145)
WBC # BLD: 5.24 K/UL — SIGNIFICANT CHANGE UP (ref 3.8–10.5)
WBC # FLD AUTO: 5.24 K/UL — SIGNIFICANT CHANGE UP (ref 3.8–10.5)

## 2019-08-21 RX ADMIN — Medication 100 MILLIGRAM(S): at 17:00

## 2019-08-21 RX ADMIN — RIVAROXABAN 15 MILLIGRAM(S): KIT at 16:59

## 2019-08-21 RX ADMIN — Medication 20 MILLIGRAM(S): at 17:00

## 2019-08-21 RX ADMIN — Medication 20 MILLIGRAM(S): at 06:56

## 2019-08-21 RX ADMIN — Medication 100 MILLIGRAM(S): at 06:56

## 2019-08-21 RX ADMIN — SPIRONOLACTONE 25 MILLIGRAM(S): 25 TABLET, FILM COATED ORAL at 06:56

## 2019-08-21 RX ADMIN — SENNA PLUS 2 TABLET(S): 8.6 TABLET ORAL at 21:43

## 2019-08-21 RX ADMIN — MIRTAZAPINE 7.5 MILLIGRAM(S): 45 TABLET, ORALLY DISINTEGRATING ORAL at 21:43

## 2019-08-21 NOTE — PROGRESS NOTE ADULT - PROBLEM SELECTOR PLAN 1
-Patient is noted to have generalized anasarca likely secondary to hypoalbuminemia   -Albumin levels: 1  -No signs of overload secondary to Heart issues, ECHO, Feb'2019 : EF = 60 to  65%. Normal diastolic function.  -No signs of malignancy on previous CT scan in February   -UA- proteinuria, could be an underlying nephropathy   -Continue IV Lasix and aldactone under albumin cover   -US abdomen showing ascites in all 4 quadrants   -f/u GI consult-Dr Chatterjee   Albumin now 2.1 after given albumin 25% IV  swelling in hands and feet seem to have improved more since admission  blood cultures negative  ensure added to diet

## 2019-08-21 NOTE — PROGRESS NOTE ADULT - SUBJECTIVE AND OBJECTIVE BOX
PGY 1 Note discussed with supervising resident and primary attending    Patient is a 64y old  Female who presents with a chief complaint of generalized body swelling (20 Aug 2019 11:17)    INTERVAL HPI/OVERNIGHT EVENTS: Patient seen and examined at the bedside. Patient states she is doing good. Patient believes that the swelling has improved. Denies any abdominal pain. Denies any loss of appetite. No acute events overnight. Denies any other concerns or complaints.     MEDICATIONS  (STANDING):  docusate sodium 100 milliGRAM(s) Oral two times a day  furosemide   Injectable 20 milliGRAM(s) IV Push two times a day  mirtazapine 7.5 milliGRAM(s) Oral at bedtime  rivaroxaban 15 milliGRAM(s) Oral daily  senna 2 Tablet(s) Oral at bedtime  spironolactone 25 milliGRAM(s) Oral daily    _____________  REVIEW OF SYSTEMS:    CONSTITUTIONAL: No fever   EYES: no eye pain   NECK: No pain   RESPIRATORY: No cough; No shortness of breath  CARDIOVASCULAR: No chest pain  GASTROINTESTINAL: No abdominal pain. No nausea or vomiting   NEUROLOGICAL: No headache  MUSCULOSKELETAL: No joint pain, no muscle pain; improved swelling in hands and legs  GENITOURINARY: no dysuria  PSYCHIATRY: no depression  ALL OTHER  ROS negative        Vital Signs Last 24 Hrs  T(C): 36.9 (21 Aug 2019 05:15), Max: 36.9 (20 Aug 2019 21:07)  T(F): 98.4 (21 Aug 2019 05:15), Max: 98.4 (20 Aug 2019 21:07)  HR: 97 (21 Aug 2019 06:55) (70 - 97)  BP: 111/73 (21 Aug 2019 06:55) (85/60 - 111/73)  RR: 17 (21 Aug 2019 05:15) (16 - 17)  SpO2: 100% (21 Aug 2019 05:15) (98% - 100%)    ________________________________________________  PHYSICAL EXAM:  GENERAL: Patient seen resting in bed and does not appear to be in any acute distress  HEENT: Normocephalic; conjunctivae and sclerae clear   NECK: supple  CHEST/LUNG: Clear to auscultation bilaterally    HEART: S1 S2  regular; no murmurs  ABDOMEN: Soft, Nontender to palpation, Distended due to ascites; Bowel sounds present  EXTREMITIES: mild edema noted in hands and feet, improved since admission; no calf tenderness  SKIN: warm and dry  NERVOUS SYSTEM: Awake and alert; Oriented to place, person and time    _________________________________________________  LABS:                        9.3    5.24  )-----------( 257      ( 21 Aug 2019 08:08 )             28.6     08-21    143  |  107  |  9   ----------------------------<  74  3.7   |  30  |  0.74    Ca    8.1<L>      21 Aug 2019 08:08    TPro  4.4<L>  /  Alb  2.1<L>  /  TBili  0.5  /  DBili  x   /  AST  21  /  ALT  31  /  AlkPhos  103  08-21      CAPILLARY BLOOD GLUCOSE      POCT Blood Glucose.: 79 mg/dL (21 Aug 2019 08:21)      RADIOLOGY & ADDITIONAL TESTS:    Imaging Personally Reviewed:  YES     No new imaging     Consultant(s) Notes Reviewed:   YES     Care Discussed with Consultants :     Plan of care was discussed with patient and /or primary care giver; all questions and concerns were addressed and care was aligned with patient's wishes.

## 2019-08-21 NOTE — DIETITIAN INITIAL EVALUATION ADULT. - OTHER INFO
Pt visited. Pt reports Good appetite. Per Pt  She ate Good B fast. Per Pt  she had a surgery  in April  she have lost weight unable to quantify. Pt with  Anasarca.

## 2019-08-21 NOTE — CONSULT NOTE ADULT - NEGATIVE ENMT SYMPTOMS
no ear pain/no nose bleeds/no hearing difficulty/no dry mouth/no gum bleeding/no throat pain/no dysphagia

## 2019-08-21 NOTE — CHART NOTE - NSCHARTNOTEFT_GEN_A_CORE
Upon Nutritional Assessment by the Registered Dietitian your patient was determined to meet criteria / has evidence of the following diagnosis/diagnoses:          [ ]  Mild Protein Calorie Malnutrition        [x ]  Moderate Protein Calorie Malnutrition        [ ] Severe Protein Calorie Malnutrition        [ ] Unspecified Protein Calorie Malnutrition        [ ] Underweight / BMI <19        [ ] Morbid Obesity / BMI > 40      Findings as based on:  •  Comprehensive nutrition assessment and consultation  •  Calorie counts (nutrient intake analysis)  •  Food acceptance and intake status from observations by staff  •  Follow up  •  Patient education  •  Intervention secondary to interdisciplinary rounds  •   concerns      Treatment:    The following diet has been recommended:  Continue with Ensure ENlive TID      PROVIDER Section:     By signing this assessment you are acknowledging and agree with the diagnosis/diagnoses assigned by the Registered Dietitian    Comments:

## 2019-08-21 NOTE — CONSULT NOTE ADULT - SUBJECTIVE AND OBJECTIVE BOX
[  ] STAT REQUEST              [ X ] ROUTINE REQUEST    Patient is a 64 year old female with anemia. GI consulted to evaluate.         HPI:  64 year old female with multiple medical problems including DVT  and PE presented with leg swelling and anasarca c/o intermittent blood in stool. Patient denies abdominal pain, nausea, vomiting, hematemesis, melena, fever, chills, chest pain, SOB, cough, palpitation, cough, dysuria or diarrhea.         PAIN MANAGEMENT:  Pain Scale:                0 /10  Pain Location:      Prior Colonoscopy:  No prior colonoscopy    PAST MEDICAL HISTORY  Pulmonary embolism  Constipation  DVT    SBO    HTN    DVT      PAST SURGICAL HISTORY  Intestinal surgery      Allergies    No Known Allergies          MEDICATIONS  (STANDING):  docusate sodium 100 milliGRAM(s) Oral two times a day  furosemide   Injectable 20 milliGRAM(s) IV Push two times a day  mirtazapine 7.5 milliGRAM(s) Oral at bedtime  rivaroxaban 15 milliGRAM(s) Oral daily  senna 2 Tablet(s) Oral at bedtime  spironolactone 25 milliGRAM(s) Oral daily         SOCIAL HISTORY  Advanced Directives:       [ X ] Full Code       [  ] DNR  Marital Status:         [  ] M      [ X ] S      [  ] D       [  ] W  Children:       [ X ] Yes      [  ] No  Occupation:        [  ] Employed       [ X ] Unemployed       [  ] Retired  Diet:       [ X ] Regular       [  ] PEG feeding          [  ] NG tube feeding  Drug Use:           [ X ] Patient denied          [  ] Yes  Alcohol:           [ X ] No             [  ] Yes (socially)         [  ] Yes (chronic)  Tobacco:           [  ] Yes           [X  ] No        FAMILY HISTORY  [ X ] Heart Disease            [  ] Diabetes             [  ] HTN             [  ] Colon Cancer             [  ] Stomach Cancer              [  ] Pancreatic Cancer        VITAL SIGNS   Vital Signs Last 24 Hrs  T(C): 36.8 (21 Aug 2019 14:09), Max: 36.9 (20 Aug 2019 21:07)  T(F): 98.2 (21 Aug 2019 14:09), Max: 98.4 (20 Aug 2019 21:07)  HR: 98 (21 Aug 2019 14:09) (70 - 98)  BP: 90/58 (21 Aug 2019 14:09) (90/58 - 111/73)   RR: 16 (21 Aug 2019 14:09) (16 - 17)  SpO2: 98% (21 Aug 2019 14:09) (98% - 100%)  Daily Weight in k.9 (21 Aug 2019 10:26)           CBC Full  -  ( 21 Aug 2019 08:08 )  WBC Count : 5.24 K/uL  RBC Count : 3.37 M/uL  Hemoglobin : 9.3 g/dL  Hematocrit : 28.6 %  Platelet Count - Automated : 257 K/uL  Mean Cell Volume : 84.9 fl  Mean Cell Hemoglobin : 27.6 pg  Mean Cell Hemoglobin Concentration : 32.5 gm/dL  Auto Neutrophil # : 3.07 K/uL  Auto Lymphocyte # : 1.59 K/uL  Auto Monocyte # : 0.50 K/uL  Auto Eosinophil # : 0.02 K/uL  Auto Basophil # : 0.04 K/uL  Auto Neutrophil % : 58.6 %  Auto Lymphocyte % : 30.3 %  Auto Monocyte % : 9.5 %  Auto Eosinophil % : 0.4 %  Auto Basophil % : 0.8 %          143  |  107  |  9   ----------------------------<  74  3.7   |  30  |  0.74    Ca    8.1<L>      21 Aug 2019 08:08    TPro  4.4<L>  /  Alb  2.1<L>  /  TBili  0.5  /  DBili  x   /  AST  21  /  ALT  31  /  AlkPhos  103         Occult Blood, Feces (19 @ 20:59)    Occult Blood, Feces: Positive    Iron with Total Binding Capacity (19 @ 10:15)    % Saturation, Iron: 69 %    Iron - Total Binding Capacity.: 86 ug/dL    Iron Total, Serum: 59 ug/dL    Unsaturated Iron Binding Capacity: 27 ug/dL    Urinalysis (19 @ 00:41)    Blood, Urine: Negative    Glucose Qualitative, Urine: Negative    pH Urine: 6.0    Color: Yellow    Urine Appearance: Clear    Bilirubin: Small    Ketone - Urine: Negative    Specific Gravity: 1.015    Protein, Urine: 15    Urobilinogen: 1    Nitrite: Negative    Leukocyte Esterase Concentration: Moderate    ECG  Ventricular Rate 74 BPM    Atrial Rate 74 BPM    P-R Interval 138 ms    QRS Duration 76 ms    Q-T Interval 398 ms    QTC Calculation(Bezet) 441 ms    P Axis 5 degrees    R Axis -16 degrees    T Axis -31 degrees    Diagnosis Line Normal sinus rhythm  Low voltage QRS  Possible Anterolateral infarct , age undetermined  Abnormal ECG      RADIOLOGY/IMAGING                EXAM:  CT ABDOMEN AND PELVIS OC IC                          EXAM:  CT CHEST OC IC                            PROCEDURE DATE:  2019          INTERPRETATION:  CT CHEST/ABDOMEN/PELVIS:     CLINICAL INFORMATION:  Shortness of breath. Generalized body swelling.  Past medical history of recurrent small bowel obstruction and chronic DVT.    COMPARISON: CT scan chest abdomen pelvis 2019.    PROCEDURE:  Using multislice helical CT, 2.5 mm sections were obtained   from the thoracic inlet through the ischial tuberosities.    Multiplanar reformatted images.    No central pulmonary artery defect is noted.  The evaluation for segmental pulmonary artery defect is limited on this   study.  Aberrrant right subclavian artery is noted.    No enlarged hilar or mediastinal lymphadenopathy is noted.    No pericardial effusion is noted.    There is a small right-sided pleural effusion and small to moderate   left-sided pleural effusion with underlying compressive atelectasis.    The central airwaysremain patent; no central endobronchial lesion is   noted.    There is diffuse soft tissue anasarca.  There is moderate volume of abdominal and pelvic ascites.    There is fatty infiltration of the liver. Cholelithiasis again noted.  No biliary ductal dilatation is noted.    No hydronephrosis is noted.    The abdominal aorta is normal in caliber.  No enlarged retroperitoneal lymphadenopathy is noted.  There is an infrarenal IVC filter.  There is a focal filling defect within the inferior cava, above the level   of the IVC filter, which may reflect clot.    There is suggestion of a filling defect in the left femoral vein, which   is noted on the prior CT scan.    Evaluation of the stomach is limited without distention.  There is diffuse gastric fold thickening.  There is a large fecal load throughout the right colon and transverse   colon through splenic flexure.  The colon is distended, including a distended, air-filled redundant   sigmoid colon. There is an abrupt transition in the distal sigmoid with   relative collapse of the rectum containing minimal stool.  There are probable thickened small bowel loops in the left abdomen.  No localized intra-abdominal fluid collection or pneumoperitoneum is   noted.    There is a Cain catheterwith air present within the urinary bladder.  Free fluid is present within the pelvis.    Impression:    IVC filter with filling defect in the suprarenal portion of the IVC   suggestive of thrombus.  Thrombus left femoral vein as noted on prior CT study.    Colonic distention with redundant sigmoid colon with abrupt transition   distally.  Findings, may reflect distal colonic obstruction; cannot exclude sigmoid   volvulus.  Differential includes a paralytic ileus related to ischemic bowel.    Theabove findings were discussed with Dr. ANN-MARIE Ross  by telephone at the   time of interpretation on 2019.

## 2019-08-21 NOTE — DIETITIAN INITIAL EVALUATION ADULT. - PROBLEM SELECTOR PLAN 1
-Patient is noted to have generalized anasarca likely secondary to hypoalbuminemia   -Albumin levels; 1  -No signs of overload secondary to Heart issues, ECHO, Feb'2019 : EF = 60 to  65%. Normal diastolic function.  -No signs of malignancy on previous CT scan in February   -UA- proteinuria, could be an underlying nephropathy   -Follow  urine PCR   -Start IV Lasix and aldactone under albumin cover   -Follow US abdomen , may need ascitic tap.   -GI consult- Dr Chatterjee   -Nephro consult- Dr Barnett

## 2019-08-21 NOTE — CONSULT NOTE ADULT - ASSESSMENT
The etiology for hematochezia and anemia in this patient can be due to:  1. Rectal ulcer  2. Colorectal neoplasm  3. Hemorrhoids    Suggestions:    1. Monitor H/H  2. Transfuse PRBC as needed  3. Protonix daily  4. Patient is high risk for colonoscopy  5. Consider Virtual colonoscopy out patient  6. Avoid NSAID  7. Stool softener  8. Protonix daily  9. Consider daily stool softener / Laxative   10. DVT prophylaxis

## 2019-08-21 NOTE — PROGRESS NOTE ADULT - PROBLEM SELECTOR PLAN 2
-patient is noted to have anemia   -Stool occult positive   -ferrtin, folate wnl  -total iron wnl  -f/u GI consult

## 2019-08-21 NOTE — PROGRESS NOTE ADULT - PROBLEM SELECTOR PLAN 5
-Patient has hx of SBO   -No Nausea/vomiting, abdominal pain, constipation at present   -denies loss of appetite  -on regular diet now, continue ensure

## 2019-08-22 LAB
ALBUMIN SERPL ELPH-MCNC: 1.9 G/DL — LOW (ref 3.5–5)
ALP SERPL-CCNC: 104 U/L — SIGNIFICANT CHANGE UP (ref 40–120)
ALT FLD-CCNC: 34 U/L DA — SIGNIFICANT CHANGE UP (ref 10–60)
ANION GAP SERPL CALC-SCNC: 2 MMOL/L — LOW (ref 5–17)
APPEARANCE UR: CLEAR — SIGNIFICANT CHANGE UP
AST SERPL-CCNC: 24 U/L — SIGNIFICANT CHANGE UP (ref 10–40)
BACTERIA # UR AUTO: ABNORMAL /HPF
BASOPHILS # BLD AUTO: 0.04 K/UL — SIGNIFICANT CHANGE UP (ref 0–0.2)
BASOPHILS NFR BLD AUTO: 0.8 % — SIGNIFICANT CHANGE UP (ref 0–2)
BILIRUB SERPL-MCNC: 0.5 MG/DL — SIGNIFICANT CHANGE UP (ref 0.2–1.2)
BILIRUB UR-MCNC: NEGATIVE — SIGNIFICANT CHANGE UP
BUN SERPL-MCNC: 11 MG/DL — SIGNIFICANT CHANGE UP (ref 7–18)
CALCIUM SERPL-MCNC: 7.7 MG/DL — LOW (ref 8.4–10.5)
CHLORIDE SERPL-SCNC: 109 MMOL/L — HIGH (ref 96–108)
CO2 SERPL-SCNC: 33 MMOL/L — HIGH (ref 22–31)
COLOR SPEC: YELLOW — SIGNIFICANT CHANGE UP
CREAT SERPL-MCNC: 0.64 MG/DL — SIGNIFICANT CHANGE UP (ref 0.5–1.3)
DIFF PNL FLD: NEGATIVE — SIGNIFICANT CHANGE UP
EOSINOPHIL # BLD AUTO: 0.03 K/UL — SIGNIFICANT CHANGE UP (ref 0–0.5)
EOSINOPHIL NFR BLD AUTO: 0.6 % — SIGNIFICANT CHANGE UP (ref 0–6)
EPI CELLS # UR: ABNORMAL /HPF
GLUCOSE SERPL-MCNC: 69 MG/DL — LOW (ref 70–99)
GLUCOSE UR QL: NEGATIVE — SIGNIFICANT CHANGE UP
HCT VFR BLD CALC: 27.7 % — LOW (ref 34.5–45)
HGB BLD-MCNC: 9 G/DL — LOW (ref 11.5–15.5)
IMM GRANULOCYTES NFR BLD AUTO: 0.2 % — SIGNIFICANT CHANGE UP (ref 0–1.5)
KETONES UR-MCNC: ABNORMAL
LEUKOCYTE ESTERASE UR-ACNC: ABNORMAL
LYMPHOCYTES # BLD AUTO: 1.44 K/UL — SIGNIFICANT CHANGE UP (ref 1–3.3)
LYMPHOCYTES # BLD AUTO: 29.3 % — SIGNIFICANT CHANGE UP (ref 13–44)
MCHC RBC-ENTMCNC: 28 PG — SIGNIFICANT CHANGE UP (ref 27–34)
MCHC RBC-ENTMCNC: 32.5 GM/DL — SIGNIFICANT CHANGE UP (ref 32–36)
MCV RBC AUTO: 86 FL — SIGNIFICANT CHANGE UP (ref 80–100)
MONOCYTES # BLD AUTO: 0.52 K/UL — SIGNIFICANT CHANGE UP (ref 0–0.9)
MONOCYTES NFR BLD AUTO: 10.6 % — SIGNIFICANT CHANGE UP (ref 2–14)
NEUTROPHILS # BLD AUTO: 2.87 K/UL — SIGNIFICANT CHANGE UP (ref 1.8–7.4)
NEUTROPHILS NFR BLD AUTO: 58.5 % — SIGNIFICANT CHANGE UP (ref 43–77)
NITRITE UR-MCNC: NEGATIVE — SIGNIFICANT CHANGE UP
NRBC # BLD: 0 /100 WBCS — SIGNIFICANT CHANGE UP (ref 0–0)
PH UR: 7 — SIGNIFICANT CHANGE UP (ref 5–8)
PLATELET # BLD AUTO: 252 K/UL — SIGNIFICANT CHANGE UP (ref 150–400)
POTASSIUM SERPL-MCNC: 3.8 MMOL/L — SIGNIFICANT CHANGE UP (ref 3.5–5.3)
POTASSIUM SERPL-SCNC: 3.8 MMOL/L — SIGNIFICANT CHANGE UP (ref 3.5–5.3)
PROT SERPL-MCNC: 4.3 G/DL — LOW (ref 6–8.3)
PROT UR-MCNC: NEGATIVE — SIGNIFICANT CHANGE UP
RBC # BLD: 3.22 M/UL — LOW (ref 3.8–5.2)
RBC # FLD: 23.9 % — HIGH (ref 10.3–14.5)
RBC CASTS # UR COMP ASSIST: SIGNIFICANT CHANGE UP /HPF (ref 0–2)
SODIUM SERPL-SCNC: 144 MMOL/L — SIGNIFICANT CHANGE UP (ref 135–145)
SP GR SPEC: 1.01 — SIGNIFICANT CHANGE UP (ref 1.01–1.02)
UROBILINOGEN FLD QL: 1
WBC # BLD: 4.91 K/UL — SIGNIFICANT CHANGE UP (ref 3.8–10.5)
WBC # FLD AUTO: 4.91 K/UL — SIGNIFICANT CHANGE UP (ref 3.8–10.5)
WBC UR QL: SIGNIFICANT CHANGE UP /HPF (ref 0–5)

## 2019-08-22 RX ORDER — FUROSEMIDE 40 MG
20 TABLET ORAL
Refills: 0 | Status: DISCONTINUED | OUTPATIENT
Start: 2019-08-22 | End: 2019-08-24

## 2019-08-22 RX ORDER — ALBUMIN HUMAN 25 %
100 VIAL (ML) INTRAVENOUS EVERY 4 HOURS
Refills: 0 | Status: COMPLETED | OUTPATIENT
Start: 2019-08-22 | End: 2019-08-22

## 2019-08-22 RX ADMIN — Medication 50 MILLILITER(S): at 13:50

## 2019-08-22 RX ADMIN — Medication 20 MILLIGRAM(S): at 06:33

## 2019-08-22 RX ADMIN — MIRTAZAPINE 7.5 MILLIGRAM(S): 45 TABLET, ORALLY DISINTEGRATING ORAL at 21:32

## 2019-08-22 RX ADMIN — SENNA PLUS 2 TABLET(S): 8.6 TABLET ORAL at 21:32

## 2019-08-22 RX ADMIN — Medication 100 MILLIGRAM(S): at 17:03

## 2019-08-22 RX ADMIN — SPIRONOLACTONE 25 MILLIGRAM(S): 25 TABLET, FILM COATED ORAL at 06:33

## 2019-08-22 RX ADMIN — RIVAROXABAN 15 MILLIGRAM(S): KIT at 12:20

## 2019-08-22 RX ADMIN — Medication 100 MILLIGRAM(S): at 06:33

## 2019-08-22 RX ADMIN — Medication 20 MILLIGRAM(S): at 17:03

## 2019-08-22 RX ADMIN — Medication 50 MILLILITER(S): at 12:20

## 2019-08-22 NOTE — PROGRESS NOTE ADULT - SUBJECTIVE AND OBJECTIVE BOX
Patient was seen and examined  Patient is a 64y old  Female who presents with a chief complaint of generalized body swelling (21 Aug 2019 11:00)      INTERVAL HPI/OVERNIGHT EVENTS:  T(C): 36.9 (08-22-19 @ 05:13), Max: 36.9 (08-22-19 @ 05:13)  HR: 82 (08-22-19 @ 06:30) (77 - 105)  BP: 111/73 (08-22-19 @ 06:30) (90/58 - 111/73)  RR: 16 (08-22-19 @ 05:13) (16 - 17)  SpO2: 100% (08-22-19 @ 05:13) (98% - 100%)  Wt(kg): --  I&O's Summary      LABS:                        9.0    4.91  )-----------( 252      ( 22 Aug 2019 08:10 )             27.7     08-22    x   |  x   |  x   ----------------------------<  x   x    |  x   |  0.64    Ca    8.1<L>      21 Aug 2019 08:08    TPro  4.3<L>  /  Alb  x   /  TBili  0.5  /  DBili  x   /  AST  24  /  ALT  34  /  AlkPhos  104  08-22        CAPILLARY BLOOD GLUCOSE        LIPID PANEL  Cholesterol <50  LDL <24  HDL 15  RATIO HDL/Total Cholesterol --  Triglyceride 56            MEDICATIONS  (STANDING):  docusate sodium 100 milliGRAM(s) Oral two times a day  furosemide   Injectable 20 milliGRAM(s) IV Push two times a day  mirtazapine 7.5 milliGRAM(s) Oral at bedtime  rivaroxaban 15 milliGRAM(s) Oral daily  senna 2 Tablet(s) Oral at bedtime  spironolactone 25 milliGRAM(s) Oral daily    MEDICATIONS  (PRN):      RADIOLOGY & ADDITIONAL TESTS:    Imaging Personally Reviewed:  [ ] YES  [ ] NO    REVIEW OF SYSTEMS:  CONSTITUTIONAL: No fever, weight loss, or fatigue  EYES: No eye pain, visual disturbances, or discharge  ENMT:  No difficulty hearing, tinnitus, vertigo; No sinus or throat pain  NECK: No pain or stiffness  BREASTS: No pain, masses, or nipple discharge  RESPIRATORY: No cough, wheezing, chills or hemoptysis; No shortness of breath  CARDIOVASCULAR: No chest pain, palpitations, dizziness, or leg swelling  GASTROINTESTINAL: No abdominal or epigastric pain. No nausea, vomiting, or hematemesis; No diarrhea or constipation. No melena or hematochezia.  GENITOURINARY: No dysuria, frequency, hematuria, or incontinence  NEUROLOGICAL: No headaches, memory loss, loss of strength, numbness, or tremors  SKIN: No itching, burning, rashes, or lesions   LYMPH NODES: No enlarged glands  ENDOCRINE: No heat or cold intolerance; No hair loss  MUSCULOSKELETAL: No joint pain or swelling; No muscle, back, or extremity pain  PSYCHIATRIC: No depression, anxiety, mood swings, or difficulty sleeping  HEME/LYMPH: No easy bruising, or bleeding gums  ALLERY AND IMMUNOLOGIC: No hives or eczema      Consultant(s) Notes Reviewed:  [ x ] YES  [ ] NO    PHYSICAL EXAM:  GENERAL: NAD, well-groomed, well-developed  HEAD:  Atraumatic, Normocephalic  EYES: EOMI, PERRLA, conjunctiva and sclera clear  ENMT: No tonsillar erythema, exudates, or enlargement; Moist mucous membranes, Good dentition, No lesions  NECK: Supple, No JVD, Normal thyroid  NERVOUS SYSTEM:  Alert & Oriented X3, Good concentration; Motor Strength 5/5 B/L upper and lower extremities; DTRs 2+ intact and symmetric  CHEST/LUNG: Clear to percussion bilaterally; No rales, rhonchi, wheezing, or rubs  HEART: Regular rate and rhythm; No murmurs, rubs, or gallops  ABDOMEN: Soft, Nontender, Nondistended; Bowel sounds present  EXTREMITIES:  2+ Peripheral Pulses, No clubbing, cyanosis, or edema  LYMPH: No lymphadenopathy noted  SKIN: No rashes or lesions    Care Discussed with Consultants/Other Providers [ x] YES  [ ] NO

## 2019-08-22 NOTE — PROGRESS NOTE ADULT - PROBLEM SELECTOR PLAN 2
-patient is noted to have anemia   -Stool occult positive   -ferrtin, folate wnl  -total iron wnl  -GI consult noted: f/u outpatient with virtual colonoscopy

## 2019-08-22 NOTE — PROGRESS NOTE ADULT - SUBJECTIVE AND OBJECTIVE BOX
PGY 1 Note discussed with supervising resident and primary attending    Patient is a 64y old  Female who presents with a chief complaint of generalized body swelling (21 Aug 2019 11:00)    INTERVAL HPI/OVERNIGHT EVENTS: Patient seen and examined at the bedside. Patient states she is going good today. Believes the swelling has improved. Denies any concerns or complaints. Denies any loss of appetite. No acute events overnight.     MEDICATIONS  (STANDING):  albumin human 25% IVPB 100 milliLiter(s) IV Intermittent every 4 hours  docusate sodium 100 milliGRAM(s) Oral two times a day  furosemide    Tablet 20 milliGRAM(s) Oral two times a day  mirtazapine 7.5 milliGRAM(s) Oral at bedtime  rivaroxaban 15 milliGRAM(s) Oral daily  senna 2 Tablet(s) Oral at bedtime  spironolactone 25 milliGRAM(s) Oral daily    __________________________________________________  REVIEW OF SYSTEMS:    CONSTITUTIONAL: No fever   EYES: no visual disturbances or eye pain  NECK: No pain  RESPIRATORY: No cough; No shortness of breath  CARDIOVASCULAR: No chest pain  GASTROINTESTINAL: No abdominal pain. No nausea or vomiting   NEUROLOGICAL: No headache  MUSCULOSKELETAL: No joint pain, no muscle pain  GENITOURINARY: no dysuria  PSYCHIATRY: no depression, no anxiety  ALL OTHER  ROS negative        Vital Signs Last 24 Hrs  T(C): 36.9 (22 Aug 2019 05:13), Max: 36.9 (22 Aug 2019 05:13)  T(F): 98.4 (22 Aug 2019 05:13), Max: 98.4 (22 Aug 2019 05:13)  HR: 82 (22 Aug 2019 06:30) (77 - 105)  BP: 111/73 (22 Aug 2019 06:30) (90/58 - 111/73)  BP(mean): 82 (22 Aug 2019 06:30) (82 - 82)  RR: 16 (22 Aug 2019 05:13) (16 - 17)  SpO2: 100% (22 Aug 2019 05:13) (98% - 100%)    ________________________________________________  PHYSICAL EXAM:  GENERAL: Patient seen resting in bed and does not appear to be in acute distress  HEENT: Normocephalic; conjunctivae and sclerae clear   NECK: supple  CHEST/LUNG: Clear to auscultation bilaterally    HEART: S1 S2, regular; no murmurs  ABDOMEN: Soft, Nontender, Distended due to fluid; Bowel sounds present  EXTREMITIES: no cyanosis; improving mild edema in all extremities; no calf tenderness  SKIN: warm and dry  NERVOUS SYSTEM: Awake and alert; Oriented to place, person and time     _________________________________________________  LABS:                        9.0    4.91  )-----------( 252      ( 22 Aug 2019 08:10 )             27.7     08-22    144  |  109<H>  |  11  ----------------------------<  69<L>  3.8   |  33<H>  |  0.64    Ca    7.7<L>      22 Aug 2019 08:10    TPro  4.3<L>  /  Alb  1.9<L>  /  TBili  0.5  /  DBili  x   /  AST  24  /  ALT  34  /  AlkPhos  104  08-22      RADIOLOGY & ADDITIONAL TESTS:    Imaging Personally Reviewed:  YES     No new imaging     Consultant(s) Notes Reviewed:   YES     Care Discussed with Consultants :     Plan of care was discussed with patient and /or primary care giver; all questions and concerns were addressed and care was aligned with patient's wishes.

## 2019-08-22 NOTE — PROGRESS NOTE ADULT - PROBLEM SELECTOR PLAN 1
-Patient is noted to have generalized anasarca likely secondary to hypoalbuminemia   -Albumin levels: 1  -No signs of overload secondary to Heart issues, ECHO, Feb'2019 : EF = 60 to  65%. Normal diastolic function.  -No signs of malignancy on previous CT scan in February   -UA- proteinuria, could be an underlying nephropathy   -Continue aldactone   - changing IV lasix to PO   -US abdomen showing ascites in all 4 quadrants   -GI consult noted: virtual colonoscopy as outpatient   Albumin now 1.9    -two more doses of albumin to be given today  swelling in hands and feet seem to have improved more since admission  blood cultures negative  continue ensure

## 2019-08-23 ENCOUNTER — TRANSCRIPTION ENCOUNTER (OUTPATIENT)
Age: 64
End: 2019-08-23

## 2019-08-23 LAB
ALBUMIN SERPL ELPH-MCNC: 1.7 G/DL — LOW (ref 3.5–5)
ALP SERPL-CCNC: 81 U/L — SIGNIFICANT CHANGE UP (ref 40–120)
ALT FLD-CCNC: 28 U/L DA — SIGNIFICANT CHANGE UP (ref 10–60)
ANION GAP SERPL CALC-SCNC: 2 MMOL/L — LOW (ref 5–17)
AST SERPL-CCNC: 19 U/L — SIGNIFICANT CHANGE UP (ref 10–40)
BASOPHILS # BLD AUTO: 0.06 K/UL — SIGNIFICANT CHANGE UP (ref 0–0.2)
BASOPHILS NFR BLD AUTO: 1.4 % — SIGNIFICANT CHANGE UP (ref 0–2)
BILIRUB SERPL-MCNC: 0.3 MG/DL — SIGNIFICANT CHANGE UP (ref 0.2–1.2)
BLD GP AB SCN SERPL QL: SIGNIFICANT CHANGE UP
BUN SERPL-MCNC: 12 MG/DL — SIGNIFICANT CHANGE UP (ref 7–18)
CALCIUM SERPL-MCNC: 7.6 MG/DL — LOW (ref 8.4–10.5)
CHLORIDE SERPL-SCNC: 110 MMOL/L — HIGH (ref 96–108)
CO2 SERPL-SCNC: 33 MMOL/L — HIGH (ref 22–31)
CREAT SERPL-MCNC: 0.57 MG/DL — SIGNIFICANT CHANGE UP (ref 0.5–1.3)
EOSINOPHIL # BLD AUTO: 0.03 K/UL — SIGNIFICANT CHANGE UP (ref 0–0.5)
EOSINOPHIL NFR BLD AUTO: 0.7 % — SIGNIFICANT CHANGE UP (ref 0–6)
GLUCOSE SERPL-MCNC: 64 MG/DL — LOW (ref 70–99)
HCT VFR BLD CALC: 22.9 % — LOW (ref 34.5–45)
HGB BLD-MCNC: 7.5 G/DL — LOW (ref 11.5–15.5)
IMM GRANULOCYTES NFR BLD AUTO: 0.5 % — SIGNIFICANT CHANGE UP (ref 0–1.5)
LYMPHOCYTES # BLD AUTO: 1.42 K/UL — SIGNIFICANT CHANGE UP (ref 1–3.3)
LYMPHOCYTES # BLD AUTO: 33.2 % — SIGNIFICANT CHANGE UP (ref 13–44)
MCHC RBC-ENTMCNC: 27.8 PG — SIGNIFICANT CHANGE UP (ref 27–34)
MCHC RBC-ENTMCNC: 32.8 GM/DL — SIGNIFICANT CHANGE UP (ref 32–36)
MCV RBC AUTO: 84.8 FL — SIGNIFICANT CHANGE UP (ref 80–100)
MONOCYTES # BLD AUTO: 0.44 K/UL — SIGNIFICANT CHANGE UP (ref 0–0.9)
MONOCYTES NFR BLD AUTO: 10.3 % — SIGNIFICANT CHANGE UP (ref 2–14)
NEUTROPHILS # BLD AUTO: 2.31 K/UL — SIGNIFICANT CHANGE UP (ref 1.8–7.4)
NEUTROPHILS NFR BLD AUTO: 53.9 % — SIGNIFICANT CHANGE UP (ref 43–77)
NRBC # BLD: 0 /100 WBCS — SIGNIFICANT CHANGE UP (ref 0–0)
PLATELET # BLD AUTO: 216 K/UL — SIGNIFICANT CHANGE UP (ref 150–400)
POTASSIUM SERPL-MCNC: 3.8 MMOL/L — SIGNIFICANT CHANGE UP (ref 3.5–5.3)
POTASSIUM SERPL-SCNC: 3.8 MMOL/L — SIGNIFICANT CHANGE UP (ref 3.5–5.3)
PROT SERPL-MCNC: 3.8 G/DL — LOW (ref 6–8.3)
RBC # BLD: 2.7 M/UL — LOW (ref 3.8–5.2)
RBC # FLD: 24 % — HIGH (ref 10.3–14.5)
SODIUM SERPL-SCNC: 145 MMOL/L — SIGNIFICANT CHANGE UP (ref 135–145)
WBC # BLD: 4.28 K/UL — SIGNIFICANT CHANGE UP (ref 3.8–10.5)
WBC # FLD AUTO: 4.28 K/UL — SIGNIFICANT CHANGE UP (ref 3.8–10.5)

## 2019-08-23 RX ORDER — FOLIC ACID 0.8 MG
1 TABLET ORAL DAILY
Refills: 0 | Status: DISCONTINUED | OUTPATIENT
Start: 2019-08-23 | End: 2019-08-24

## 2019-08-23 RX ADMIN — Medication 1 MILLIGRAM(S): at 12:02

## 2019-08-23 RX ADMIN — Medication 100 MILLIGRAM(S): at 17:45

## 2019-08-23 RX ADMIN — Medication 20 MILLIGRAM(S): at 06:39

## 2019-08-23 RX ADMIN — MIRTAZAPINE 7.5 MILLIGRAM(S): 45 TABLET, ORALLY DISINTEGRATING ORAL at 22:12

## 2019-08-23 RX ADMIN — Medication 100 MILLIGRAM(S): at 06:39

## 2019-08-23 RX ADMIN — Medication 20 MILLIGRAM(S): at 17:45

## 2019-08-23 RX ADMIN — RIVAROXABAN 15 MILLIGRAM(S): KIT at 12:03

## 2019-08-23 RX ADMIN — SENNA PLUS 2 TABLET(S): 8.6 TABLET ORAL at 22:12

## 2019-08-23 RX ADMIN — SPIRONOLACTONE 25 MILLIGRAM(S): 25 TABLET, FILM COATED ORAL at 06:39

## 2019-08-23 NOTE — PROGRESS NOTE ADULT - PROBLEM SELECTOR PLAN 2
-patient is noted to have anemia   -Stool occult positive   -ferrtin, folate wnl  -total iron wnl  -GI consult noted: f/u outpatient with virtual colonoscopy  -Hgb noted to be 7.5 today from 9.0; patient to receive 2 units of PRBCs  -patient is dc planning pending post transfusion CBC

## 2019-08-23 NOTE — DISCHARGE NOTE PROVIDER - HOSPITAL COURSE
Patient is a 64 year old female, from home with PMH of Recurrent SBO's, s/p exp lap, SB resection,  DVT ( Left UE , then Left LE), PE, chronic leg swelling, and anasarca, who comes in with complaint of generalized body swelling.              Patient's albumin level was noted to be 1.0. UA showed proteinuria. US abdomen showed ascites. Patient is a 64 year old female, from home with PMH of Recurrent SBO's, s/p exp lap, SB resection,  DVT ( Left UE , then Left LE), PE, chronic leg swelling, and anasarca, who comes in with complaint of generalized body swelling.              Patient's albumin level was noted to be 1.0. UA showed proteinuria. US abdomen showed ascites. Blood and urine cultures were negative.        Patient treated with IV lasix, aldactone and albumin.        Albumin levels slightly improved. Body swelling improved especially in the hands and feet.        Patient was seen by gastroenterology for anemia and positive FOBT. Recommended for virtual colonoscopy as outpatient as patient is high risk for    colonoscopy.        Patient's Hgb was noted to be 7.5 dropping from 9.0. Patient was transfused with 2 units PRBCs.         Low albumin levels are most likely due to malnutrition. Patient encouraged to increase PO intake.        Patient is medically stable for discharge. Case was discussed with attending. Patient is a 64 year old female, from home with PMH of Recurrent SBO's, s/p exp lap, SB resection,  DVT ( Left UE , then Left LE), PE, chronic leg swelling, and anasarca, who comes in with complaint of generalized body swelling.              Patient's albumin level was noted to be 1.0. UA showed proteinuria. US abdomen showed ascites. Blood and urine cultures were negative.        Patient treated with IV lasix, aldactone and albumin.        Albumin levels slightly improved. Body swelling improved especially in the hands and feet.        Patient was seen by gastroenterology for anemia and positive FOBT. Recommended for virtual colonoscopy as outpatient as patient is high risk for    colonoscopy.        Patient's Hgb was noted to be 7.5 dropping from 9.0. Patient was transfused with 2 units PRBCs and Hgb went up to 10.0.        Low albumin levels are most likely due to malnutrition. Patient encouraged to increase PO intake.        Patient is medically stable for discharge. Case was discussed with attending.

## 2019-08-23 NOTE — PROGRESS NOTE ADULT - SUBJECTIVE AND OBJECTIVE BOX
PGY 1 Note discussed with supervising resident and primary attending    Patient is a 64y old  Female who presents with a chief complaint of generalized body swelling (22 Aug 2019 11:39)    INTERVAL HPI/OVERNIGHT EVENTS: Patient seen and examined at the bedside. Patient states she is feeling fine. Denies any pain. Patient's hemoglobin was noted to be 7.5 this morning. Denies any signs of bleeding. Denies black stools or blood in stool. Denies lightheadedness or dizziness. Patient to receive 2 units PRBCs. Patient stable for dc pending post CBC transfusion. No acute events overnight.     MEDICATIONS  (STANDING):  docusate sodium 100 milliGRAM(s) Oral two times a day  folic acid 1 milliGRAM(s) Oral daily  furosemide    Tablet 20 milliGRAM(s) Oral two times a day  mirtazapine 7.5 milliGRAM(s) Oral at bedtime  rivaroxaban 15 milliGRAM(s) Oral daily  senna 2 Tablet(s) Oral at bedtime  spironolactone 25 milliGRAM(s) Oral daily    __________________________________________________  REVIEW OF SYSTEMS:    CONSTITUTIONAL: No fever   EYES: no visual disturbances or eye pain  NECK: No pain   RESPIRATORY: No cough; No shortness of breath  CARDIOVASCULAR: No chest pain  GASTROINTESTINAL: No abdominal pain. No nausea or vomiting   NEUROLOGICAL: No headache   MUSCULOSKELETAL: No joint pain, no muscle pain  GENITOURINARY: no dysuria  PSYCHIATRY: no depression, no anxiety  ALL OTHER  ROS negative        Vital Signs Last 24 Hrs  T(C): 36.8 (23 Aug 2019 05:40), Max: 37.1 (22 Aug 2019 14:22)  T(F): 98.2 (23 Aug 2019 05:40), Max: 98.8 (22 Aug 2019 14:22)  HR: 78 (23 Aug 2019 06:38) (72 - 84)  BP: 105/69 (23 Aug 2019 06:38) (95/56 - 108/70)  RR: 16 (23 Aug 2019 05:40) (16 - 18)  SpO2: 100% (23 Aug 2019 05:40) (98% - 100%)    ________________________________________________  PHYSICAL EXAM:  GENERAL: Patient seen resting in bed and does not appear to be in distress  HEENT: Normocephalic; conjunctivae and sclerae clear   NECK: supple  CHEST/LUNG: Clear to auscultation bilaterally    HEART: S1 S2, regular; no murmurs  ABDOMEN: Soft, Nontender, Distended due to fluid; Bowel sounds present  EXTREMITIES: no cyanosis; improved edema; no calf tenderness  SKIN: warm and dry  NERVOUS SYSTEM: Awake and alert; Oriented to place, person and time    _________________________________________________  LABS:                        7.5    4.28  )-----------( 216      ( 23 Aug 2019 06:14 )             22.9     08-23    145  |  110<H>  |  12  ----------------------------<  64<L>  3.8   |  33<H>  |  0.57    Ca    7.6<L>      23 Aug 2019 06:14    TPro  3.8<L>  /  Alb  1.7<L>  /  TBili  0.3  /  DBili  x   /  AST  19  /  ALT  28  /  AlkPhos  81  08-23      Urinalysis Basic - ( 22 Aug 2019 21:37 )    Color: Yellow / Appearance: Clear / S.010 / pH: x  Gluc: x / Ketone: Small  / Bili: Negative / Urobili: 1   Blood: x / Protein: Negative / Nitrite: Negative   Leuk Esterase: Trace / RBC: 0-2 /HPF / WBC 0-2 /HPF   Sq Epi: x / Non Sq Epi: Moderate /HPF / Bacteria: Few /HPF      RADIOLOGY & ADDITIONAL TESTS:    Imaging Personally Reviewed:  YES     No new imaging     Consultant(s) Notes Reviewed:   YES     Care Discussed with Consultants :     Plan of care was discussed with patient and /or primary care giver; all questions and concerns were addressed and care was aligned with patient's wishes.

## 2019-08-23 NOTE — PROGRESS NOTE ADULT - PROBLEM SELECTOR PLAN 1
-Patient is noted to have generalized anasarca likely secondary to hypoalbuminemia from malnutrition  -Albumin levels: 1  -No signs of overload secondary to Heart issues, ECHO, Feb'2019 : EF = 60 to  65%. Normal diastolic function.  -No signs of malignancy on previous CT scan in February   -UA- proteinuria, could be an underlying nephropathy  -repeat UA showed no protein   -Continue aldactone   - continue lasix PO   -US abdomen showing ascites in all 4 quadrants   -GI consult noted: virtual colonoscopy as outpatient   Albumin now 1.7    swelling in hands and feet seem to have improved more since admission  blood cultures negative  continue ensure

## 2019-08-24 ENCOUNTER — TRANSCRIPTION ENCOUNTER (OUTPATIENT)
Age: 64
End: 2019-08-24

## 2019-08-24 VITALS
TEMPERATURE: 98 F | OXYGEN SATURATION: 100 % | SYSTOLIC BLOOD PRESSURE: 103 MMHG | HEART RATE: 82 BPM | RESPIRATION RATE: 16 BRPM | DIASTOLIC BLOOD PRESSURE: 62 MMHG

## 2019-08-24 LAB
ANION GAP SERPL CALC-SCNC: 3 MMOL/L — LOW (ref 5–17)
BASOPHILS # BLD AUTO: 0.07 K/UL — SIGNIFICANT CHANGE UP (ref 0–0.2)
BASOPHILS NFR BLD AUTO: 1.2 % — SIGNIFICANT CHANGE UP (ref 0–2)
BUN SERPL-MCNC: 14 MG/DL — SIGNIFICANT CHANGE UP (ref 7–18)
CALCIUM SERPL-MCNC: 7.4 MG/DL — LOW (ref 8.4–10.5)
CHLORIDE SERPL-SCNC: 112 MMOL/L — HIGH (ref 96–108)
CO2 SERPL-SCNC: 32 MMOL/L — HIGH (ref 22–31)
CREAT SERPL-MCNC: 0.61 MG/DL — SIGNIFICANT CHANGE UP (ref 0.5–1.3)
CULTURE RESULTS: SIGNIFICANT CHANGE UP
CULTURE RESULTS: SIGNIFICANT CHANGE UP
EOSINOPHIL # BLD AUTO: 0.03 K/UL — SIGNIFICANT CHANGE UP (ref 0–0.5)
EOSINOPHIL NFR BLD AUTO: 0.5 % — SIGNIFICANT CHANGE UP (ref 0–6)
GLUCOSE SERPL-MCNC: 67 MG/DL — LOW (ref 70–99)
HCT VFR BLD CALC: 30.3 % — LOW (ref 34.5–45)
HGB BLD-MCNC: 10 G/DL — LOW (ref 11.5–15.5)
IMM GRANULOCYTES NFR BLD AUTO: 0.5 % — SIGNIFICANT CHANGE UP (ref 0–1.5)
LYMPHOCYTES # BLD AUTO: 1.6 K/UL — SIGNIFICANT CHANGE UP (ref 1–3.3)
LYMPHOCYTES # BLD AUTO: 27.8 % — SIGNIFICANT CHANGE UP (ref 13–44)
MCHC RBC-ENTMCNC: 28.2 PG — SIGNIFICANT CHANGE UP (ref 27–34)
MCHC RBC-ENTMCNC: 33 GM/DL — SIGNIFICANT CHANGE UP (ref 32–36)
MCV RBC AUTO: 85.4 FL — SIGNIFICANT CHANGE UP (ref 80–100)
MONOCYTES # BLD AUTO: 0.66 K/UL — SIGNIFICANT CHANGE UP (ref 0–0.9)
MONOCYTES NFR BLD AUTO: 11.5 % — SIGNIFICANT CHANGE UP (ref 2–14)
NEUTROPHILS # BLD AUTO: 3.36 K/UL — SIGNIFICANT CHANGE UP (ref 1.8–7.4)
NEUTROPHILS NFR BLD AUTO: 58.5 % — SIGNIFICANT CHANGE UP (ref 43–77)
NRBC # BLD: 0 /100 WBCS — SIGNIFICANT CHANGE UP (ref 0–0)
PLATELET # BLD AUTO: 211 K/UL — SIGNIFICANT CHANGE UP (ref 150–400)
POTASSIUM SERPL-MCNC: 3.3 MMOL/L — LOW (ref 3.5–5.3)
POTASSIUM SERPL-SCNC: 3.3 MMOL/L — LOW (ref 3.5–5.3)
RBC # BLD: 3.55 M/UL — LOW (ref 3.8–5.2)
RBC # FLD: 21.2 % — HIGH (ref 10.3–14.5)
SODIUM SERPL-SCNC: 147 MMOL/L — HIGH (ref 135–145)
SPECIMEN SOURCE: SIGNIFICANT CHANGE UP
SPECIMEN SOURCE: SIGNIFICANT CHANGE UP
WBC # BLD: 5.75 K/UL — SIGNIFICANT CHANGE UP (ref 3.8–10.5)
WBC # FLD AUTO: 5.75 K/UL — SIGNIFICANT CHANGE UP (ref 3.8–10.5)

## 2019-08-24 PROCEDURE — 83880 ASSAY OF NATRIURETIC PEPTIDE: CPT

## 2019-08-24 PROCEDURE — 36430 TRANSFUSION BLD/BLD COMPNT: CPT

## 2019-08-24 PROCEDURE — 80053 COMPREHEN METABOLIC PANEL: CPT

## 2019-08-24 PROCEDURE — 86901 BLOOD TYPING SEROLOGIC RH(D): CPT

## 2019-08-24 PROCEDURE — 93005 ELECTROCARDIOGRAM TRACING: CPT

## 2019-08-24 PROCEDURE — 83605 ASSAY OF LACTIC ACID: CPT

## 2019-08-24 PROCEDURE — 86923 COMPATIBILITY TEST ELECTRIC: CPT

## 2019-08-24 PROCEDURE — 82728 ASSAY OF FERRITIN: CPT

## 2019-08-24 PROCEDURE — 83036 HEMOGLOBIN GLYCOSYLATED A1C: CPT

## 2019-08-24 PROCEDURE — 82962 GLUCOSE BLOOD TEST: CPT

## 2019-08-24 PROCEDURE — 80061 LIPID PANEL: CPT

## 2019-08-24 PROCEDURE — 82272 OCCULT BLD FECES 1-3 TESTS: CPT

## 2019-08-24 PROCEDURE — 82607 VITAMIN B-12: CPT

## 2019-08-24 PROCEDURE — 36415 COLL VENOUS BLD VENIPUNCTURE: CPT

## 2019-08-24 PROCEDURE — P9040: CPT

## 2019-08-24 PROCEDURE — 85610 PROTHROMBIN TIME: CPT

## 2019-08-24 PROCEDURE — 71045 X-RAY EXAM CHEST 1 VIEW: CPT

## 2019-08-24 PROCEDURE — 82570 ASSAY OF URINE CREATININE: CPT

## 2019-08-24 PROCEDURE — 86850 RBC ANTIBODY SCREEN: CPT

## 2019-08-24 PROCEDURE — 84443 ASSAY THYROID STIM HORMONE: CPT

## 2019-08-24 PROCEDURE — 87040 BLOOD CULTURE FOR BACTERIA: CPT

## 2019-08-24 PROCEDURE — 83735 ASSAY OF MAGNESIUM: CPT

## 2019-08-24 PROCEDURE — 80048 BASIC METABOLIC PNL TOTAL CA: CPT

## 2019-08-24 PROCEDURE — P9047: CPT

## 2019-08-24 PROCEDURE — 86900 BLOOD TYPING SEROLOGIC ABO: CPT

## 2019-08-24 PROCEDURE — 76705 ECHO EXAM OF ABDOMEN: CPT

## 2019-08-24 PROCEDURE — 99285 EMERGENCY DEPT VISIT HI MDM: CPT | Mod: 25

## 2019-08-24 PROCEDURE — 84100 ASSAY OF PHOSPHORUS: CPT

## 2019-08-24 PROCEDURE — 82746 ASSAY OF FOLIC ACID SERUM: CPT

## 2019-08-24 PROCEDURE — 85730 THROMBOPLASTIN TIME PARTIAL: CPT

## 2019-08-24 PROCEDURE — 83540 ASSAY OF IRON: CPT

## 2019-08-24 PROCEDURE — 83550 IRON BINDING TEST: CPT

## 2019-08-24 PROCEDURE — 82009 KETONE BODYS QUAL: CPT

## 2019-08-24 PROCEDURE — 84156 ASSAY OF PROTEIN URINE: CPT

## 2019-08-24 PROCEDURE — 74018 RADEX ABDOMEN 1 VIEW: CPT

## 2019-08-24 PROCEDURE — 81001 URINALYSIS AUTO W/SCOPE: CPT

## 2019-08-24 PROCEDURE — 84484 ASSAY OF TROPONIN QUANT: CPT

## 2019-08-24 PROCEDURE — 87086 URINE CULTURE/COLONY COUNT: CPT

## 2019-08-24 PROCEDURE — 85027 COMPLETE CBC AUTOMATED: CPT

## 2019-08-24 RX ORDER — POTASSIUM CHLORIDE 20 MEQ
40 PACKET (EA) ORAL ONCE
Refills: 0 | Status: COMPLETED | OUTPATIENT
Start: 2019-08-24 | End: 2019-08-24

## 2019-08-24 RX ORDER — FONDAPARINUX SODIUM 2.5 MG/.5ML
1 INJECTION, SOLUTION SUBCUTANEOUS
Qty: 30 | Refills: 0
Start: 2019-08-24 | End: 2019-09-22

## 2019-08-24 RX ADMIN — Medication 100 MILLIGRAM(S): at 06:55

## 2019-08-24 RX ADMIN — Medication 1 MILLIGRAM(S): at 12:32

## 2019-08-24 RX ADMIN — Medication 20 MILLIGRAM(S): at 06:57

## 2019-08-24 RX ADMIN — RIVAROXABAN 15 MILLIGRAM(S): KIT at 12:32

## 2019-08-24 RX ADMIN — SPIRONOLACTONE 25 MILLIGRAM(S): 25 TABLET, FILM COATED ORAL at 06:57

## 2019-08-24 RX ADMIN — Medication 40 MILLIEQUIVALENT(S): at 09:51

## 2019-08-24 NOTE — DISCHARGE NOTE NURSING/CASE MANAGEMENT/SOCIAL WORK - NSDCDPATPORTLINK_GEN_ALL_CORE
You can access the [facility], offered by [facility], by registering with the following website: [email or web address] [place]

## 2019-09-01 ENCOUNTER — OUTPATIENT (OUTPATIENT)
Dept: OUTPATIENT SERVICES | Facility: HOSPITAL | Age: 64
LOS: 1 days | End: 2019-09-01
Payer: MEDICAID

## 2019-09-01 DIAGNOSIS — Z98.890 OTHER SPECIFIED POSTPROCEDURAL STATES: Chronic | ICD-10-CM

## 2019-09-09 DIAGNOSIS — Z71.89 OTHER SPECIFIED COUNSELING: ICD-10-CM

## 2020-01-01 ENCOUNTER — INPATIENT (INPATIENT)
Facility: HOSPITAL | Age: 65
LOS: 9 days | Discharge: HOME | DRG: 843 | End: 2020-05-28
Attending: INTERNAL MEDICINE | Admitting: INTERNAL MEDICINE
Payer: MEDICAID

## 2020-01-01 ENCOUNTER — TRANSCRIPTION ENCOUNTER (OUTPATIENT)
Age: 65
End: 2020-01-01

## 2020-01-01 ENCOUNTER — INPATIENT (INPATIENT)
Facility: HOSPITAL | Age: 65
LOS: 2 days | Discharge: HOME | DRG: 811 | End: 2020-03-11
Attending: INTERNAL MEDICINE | Admitting: INTERNAL MEDICINE
Payer: MEDICAID

## 2020-01-01 ENCOUNTER — RESULT REVIEW (OUTPATIENT)
Age: 65
End: 2020-01-01

## 2020-01-01 ENCOUNTER — INPATIENT (INPATIENT)
Facility: HOSPITAL | Age: 65
LOS: 70 days | Discharge: NURSING, REHAB OR LONG-TERM CARE | DRG: 981 | End: 2020-12-23
Attending: INTERNAL MEDICINE | Admitting: INTERNAL MEDICINE
Payer: MEDICAID

## 2020-01-01 VITALS
TEMPERATURE: 98 F | HEART RATE: 87 BPM | RESPIRATION RATE: 18 BRPM | OXYGEN SATURATION: 96 % | SYSTOLIC BLOOD PRESSURE: 100 MMHG | DIASTOLIC BLOOD PRESSURE: 72 MMHG

## 2020-01-01 VITALS
DIASTOLIC BLOOD PRESSURE: 71 MMHG | HEIGHT: 66 IN | RESPIRATION RATE: 17 BRPM | OXYGEN SATURATION: 97 % | HEART RATE: 86 BPM | TEMPERATURE: 98 F | SYSTOLIC BLOOD PRESSURE: 108 MMHG

## 2020-01-01 VITALS
DIASTOLIC BLOOD PRESSURE: 73 MMHG | TEMPERATURE: 98 F | SYSTOLIC BLOOD PRESSURE: 107 MMHG | OXYGEN SATURATION: 99 % | WEIGHT: 130.07 LBS | RESPIRATION RATE: 18 BRPM | HEIGHT: 66 IN | HEART RATE: 100 BPM

## 2020-01-01 VITALS
HEART RATE: 68 BPM | WEIGHT: 130.07 LBS | TEMPERATURE: 98 F | RESPIRATION RATE: 16 BRPM | OXYGEN SATURATION: 98 % | SYSTOLIC BLOOD PRESSURE: 94 MMHG | DIASTOLIC BLOOD PRESSURE: 68 MMHG | HEIGHT: 66 IN

## 2020-01-01 VITALS
TEMPERATURE: 99 F | SYSTOLIC BLOOD PRESSURE: 98 MMHG | HEART RATE: 99 BPM | DIASTOLIC BLOOD PRESSURE: 61 MMHG | OXYGEN SATURATION: 100 %

## 2020-01-01 VITALS
SYSTOLIC BLOOD PRESSURE: 93 MMHG | RESPIRATION RATE: 16 BRPM | HEART RATE: 88 BPM | OXYGEN SATURATION: 100 % | DIASTOLIC BLOOD PRESSURE: 60 MMHG | TEMPERATURE: 98 F

## 2020-01-01 DIAGNOSIS — R60.1 GENERALIZED EDEMA: ICD-10-CM

## 2020-01-01 DIAGNOSIS — R41.82 ALTERED MENTAL STATUS, UNSPECIFIED: ICD-10-CM

## 2020-01-01 DIAGNOSIS — D69.6 THROMBOCYTOPENIA, UNSPECIFIED: ICD-10-CM

## 2020-01-01 DIAGNOSIS — G93.40 ENCEPHALOPATHY, UNSPECIFIED: ICD-10-CM

## 2020-01-01 DIAGNOSIS — I50.9 HEART FAILURE, UNSPECIFIED: ICD-10-CM

## 2020-01-01 DIAGNOSIS — I95.9 HYPOTENSION, UNSPECIFIED: ICD-10-CM

## 2020-01-01 DIAGNOSIS — E83.39 OTHER DISORDERS OF PHOSPHORUS METABOLISM: ICD-10-CM

## 2020-01-01 DIAGNOSIS — R74.01 ELEVATION OF LEVELS OF LIVER TRANSAMINASE LEVELS: ICD-10-CM

## 2020-01-01 DIAGNOSIS — Z02.9 ENCOUNTER FOR ADMINISTRATIVE EXAMINATIONS, UNSPECIFIED: ICD-10-CM

## 2020-01-01 DIAGNOSIS — K56.609 UNSPECIFIED INTESTINAL OBSTRUCTION, UNSPECIFIED AS TO PARTIAL VERSUS COMPLETE OBSTRUCTION: ICD-10-CM

## 2020-01-01 DIAGNOSIS — Z98.890 OTHER SPECIFIED POSTPROCEDURAL STATES: Chronic | ICD-10-CM

## 2020-01-01 DIAGNOSIS — S31.000A UNSPECIFIED OPEN WOUND OF LOWER BACK AND PELVIS WITHOUT PENETRATION INTO RETROPERITONEUM, INITIAL ENCOUNTER: ICD-10-CM

## 2020-01-01 DIAGNOSIS — E87.6 HYPOKALEMIA: ICD-10-CM

## 2020-01-01 DIAGNOSIS — K56.7 ILEUS, UNSPECIFIED: ICD-10-CM

## 2020-01-01 DIAGNOSIS — Z29.9 ENCOUNTER FOR PROPHYLACTIC MEASURES, UNSPECIFIED: ICD-10-CM

## 2020-01-01 DIAGNOSIS — K52.9 NONINFECTIVE GASTROENTERITIS AND COLITIS, UNSPECIFIED: ICD-10-CM

## 2020-01-01 DIAGNOSIS — D64.9 ANEMIA, UNSPECIFIED: ICD-10-CM

## 2020-01-01 DIAGNOSIS — I26.99 OTHER PULMONARY EMBOLISM WITHOUT ACUTE COR PULMONALE: ICD-10-CM

## 2020-01-01 DIAGNOSIS — I82.409 ACUTE EMBOLISM AND THROMBOSIS OF UNSPECIFIED DEEP VEINS OF UNSPECIFIED LOWER EXTREMITY: ICD-10-CM

## 2020-01-01 DIAGNOSIS — R78.81 BACTEREMIA: ICD-10-CM

## 2020-01-01 DIAGNOSIS — E43 UNSPECIFIED SEVERE PROTEIN-CALORIE MALNUTRITION: ICD-10-CM

## 2020-01-01 DIAGNOSIS — E83.51 HYPOCALCEMIA: ICD-10-CM

## 2020-01-01 DIAGNOSIS — Z51.5 ENCOUNTER FOR PALLIATIVE CARE: ICD-10-CM

## 2020-01-01 DIAGNOSIS — R53.1 WEAKNESS: ICD-10-CM

## 2020-01-01 DIAGNOSIS — Z71.89 OTHER SPECIFIED COUNSELING: ICD-10-CM

## 2020-01-01 DIAGNOSIS — A41.9 SEPSIS, UNSPECIFIED ORGANISM: ICD-10-CM

## 2020-01-01 DIAGNOSIS — R53.81 OTHER MALAISE: ICD-10-CM

## 2020-01-01 DIAGNOSIS — J90 PLEURAL EFFUSION, NOT ELSEWHERE CLASSIFIED: ICD-10-CM

## 2020-01-01 DIAGNOSIS — K91.2 POSTSURGICAL MALABSORPTION, NOT ELSEWHERE CLASSIFIED: ICD-10-CM

## 2020-01-01 DIAGNOSIS — D68.9 COAGULATION DEFECT, UNSPECIFIED: ICD-10-CM

## 2020-01-01 DIAGNOSIS — I82.629 ACUTE EMBOLISM AND THROMBOSIS OF DEEP VEINS OF UNSPECIFIED UPPER EXTREMITY: ICD-10-CM

## 2020-01-01 DIAGNOSIS — R18.8 OTHER ASCITES: ICD-10-CM

## 2020-01-01 DIAGNOSIS — E88.09 OTHER DISORDERS OF PLASMA-PROTEIN METABOLISM, NOT ELSEWHERE CLASSIFIED: ICD-10-CM

## 2020-01-01 DIAGNOSIS — Z87.898 PERSONAL HISTORY OF OTHER SPECIFIED CONDITIONS: ICD-10-CM

## 2020-01-01 DIAGNOSIS — R19.7 DIARRHEA, UNSPECIFIED: ICD-10-CM

## 2020-01-01 LAB
% ALBUMIN: 40.9 % — SIGNIFICANT CHANGE UP
% ALPHA 1: 10.8 % — SIGNIFICANT CHANGE UP
% ALPHA 2: 8.2 % — SIGNIFICANT CHANGE UP
% BETA: 10.2 % — SIGNIFICANT CHANGE UP
% GAMMA: 29.9 % — SIGNIFICANT CHANGE UP
-  AMPICILLIN/SULBACTAM: SIGNIFICANT CHANGE UP
-  AMPICILLIN: SIGNIFICANT CHANGE UP
-  CANDIDA ALBICANS: SIGNIFICANT CHANGE UP
-  CANDIDA GLABRATA: SIGNIFICANT CHANGE UP
-  CANDIDA KRUSEI: SIGNIFICANT CHANGE UP
-  CANDIDA PARAPSILOSIS: SIGNIFICANT CHANGE UP
-  CANDIDA TROPICALIS: SIGNIFICANT CHANGE UP
-  CEFAZOLIN: SIGNIFICANT CHANGE UP
-  CEFTAZIDIME: SIGNIFICANT CHANGE UP
-  CLINDAMYCIN: SIGNIFICANT CHANGE UP
-  COAGULASE NEGATIVE STAPHYLOCOCCUS: SIGNIFICANT CHANGE UP
-  ERYTHROMYCIN: SIGNIFICANT CHANGE UP
-  GENTAMICIN: SIGNIFICANT CHANGE UP
-  K. PNEUMONIAE GROUP: SIGNIFICANT CHANGE UP
-  KPC RESISTANCE GENE: SIGNIFICANT CHANGE UP
-  LEVOFLOXACIN: SIGNIFICANT CHANGE UP
-  LEVOFLOXACIN: SIGNIFICANT CHANGE UP
-  LINEZOLID: SIGNIFICANT CHANGE UP
-  LINEZOLID: SIGNIFICANT CHANGE UP
-  OXACILLIN: SIGNIFICANT CHANGE UP
-  PENICILLIN: SIGNIFICANT CHANGE UP
-  RIFAMPIN: SIGNIFICANT CHANGE UP
-  STREPTOCOCCUS SP. (NOT GRP A, B OR S PNEUMONIAE): SIGNIFICANT CHANGE UP
-  TETRACYCLINE: SIGNIFICANT CHANGE UP
-  TETRACYCLINE: SIGNIFICANT CHANGE UP
-  TRIMETHOPRIM/SULFAMETHOXAZOLE: SIGNIFICANT CHANGE UP
-  TRIMETHOPRIM/SULFAMETHOXAZOLE: SIGNIFICANT CHANGE UP
-  VANCOMYCIN: SIGNIFICANT CHANGE UP
-  VANCOMYCIN: SIGNIFICANT CHANGE UP
24R-OH-CALCIDIOL SERPL-MCNC: 66.5 NG/ML — SIGNIFICANT CHANGE UP (ref 30–80)
A BAUMANNII DNA SPEC QL NAA+PROBE: SIGNIFICANT CHANGE UP
A1AT STL-MCNC: <13 MG/DL — SIGNIFICANT CHANGE UP
A1C WITH ESTIMATED AVERAGE GLUCOSE RESULT: 4 % — SIGNIFICANT CHANGE UP (ref 4–5.6)
A1C WITH ESTIMATED AVERAGE GLUCOSE RESULT: <4 % — LOW (ref 4–5.6)
A1C WITH ESTIMATED AVERAGE GLUCOSE RESULT: SIGNIFICANT CHANGE UP (ref 4–5.6)
ACANTHOCYTES BLD QL SMEAR: SLIGHT — SIGNIFICANT CHANGE UP
ACANTHOCYTES BLD QL SMEAR: SLIGHT — SIGNIFICANT CHANGE UP
ACE SERPL-CCNC: 31 U/L — SIGNIFICANT CHANGE UP (ref 14–82)
ALBUMIN SERPL ELPH-MCNC: 0.8 G/DL — LOW (ref 3.5–5)
ALBUMIN SERPL ELPH-MCNC: 0.9 G/DL — LOW (ref 3.5–5)
ALBUMIN SERPL ELPH-MCNC: 1 G/DL — LOW (ref 3.5–5)
ALBUMIN SERPL ELPH-MCNC: 1.2 G/DL — LOW (ref 3.5–5)
ALBUMIN SERPL ELPH-MCNC: 1.2 G/DL — LOW (ref 3.5–5)
ALBUMIN SERPL ELPH-MCNC: 1.5 G/DL — LOW (ref 3.5–5)
ALBUMIN SERPL ELPH-MCNC: 1.5 G/DL — LOW (ref 3.6–5.5)
ALBUMIN SERPL ELPH-MCNC: 1.6 G/DL — LOW (ref 3.5–5)
ALBUMIN SERPL ELPH-MCNC: 1.6 G/DL — LOW (ref 3.5–5)
ALBUMIN SERPL ELPH-MCNC: 1.7 G/DL — LOW (ref 3.5–5)
ALBUMIN SERPL ELPH-MCNC: 1.8 G/DL — LOW (ref 3.5–5)
ALBUMIN SERPL ELPH-MCNC: 1.9 G/DL — LOW (ref 3.5–5)
ALBUMIN SERPL ELPH-MCNC: 2 G/DL — LOW (ref 3.5–5)
ALBUMIN SERPL ELPH-MCNC: 2.1 G/DL — LOW (ref 3.5–5)
ALBUMIN SERPL ELPH-MCNC: 2.2 G/DL — LOW (ref 3.5–5)
ALBUMIN SERPL ELPH-MCNC: 2.2 G/DL — LOW (ref 3.5–5)
ALBUMIN SERPL ELPH-MCNC: 2.3 G/DL — LOW (ref 3.5–5)
ALBUMIN SERPL ELPH-MCNC: 2.4 G/DL — LOW (ref 3.5–5)
ALBUMIN SERPL ELPH-MCNC: 2.5 G/DL — LOW (ref 3.5–5)
ALBUMIN SERPL ELPH-MCNC: 2.6 G/DL — LOW (ref 3.5–5)
ALBUMIN SERPL ELPH-MCNC: 2.7 G/DL — LOW (ref 3.5–5)
ALBUMIN SERPL ELPH-MCNC: 2.7 G/DL — LOW (ref 3.5–5)
ALBUMIN SERPL ELPH-MCNC: 2.8 G/DL — LOW (ref 3.5–5)
ALBUMIN SERPL ELPH-MCNC: 2.9 G/DL — LOW (ref 3.5–5)
ALBUMIN SERPL ELPH-MCNC: 3.2 G/DL — LOW (ref 3.5–5)
ALBUMIN SERPL ELPH-MCNC: 3.2 G/DL — LOW (ref 3.5–5)
ALBUMIN SERPL ELPH-MCNC: SIGNIFICANT CHANGE UP G/DL (ref 3.5–5)
ALBUMIN/GLOB SERPL ELPH: 0.7 RATIO — SIGNIFICANT CHANGE UP
ALP SERPL-CCNC: 104 U/L — SIGNIFICANT CHANGE UP (ref 40–120)
ALP SERPL-CCNC: 105 U/L — SIGNIFICANT CHANGE UP (ref 40–120)
ALP SERPL-CCNC: 105 U/L — SIGNIFICANT CHANGE UP (ref 40–120)
ALP SERPL-CCNC: 107 U/L — SIGNIFICANT CHANGE UP (ref 40–120)
ALP SERPL-CCNC: 107 U/L — SIGNIFICANT CHANGE UP (ref 40–120)
ALP SERPL-CCNC: 108 U/L — SIGNIFICANT CHANGE UP (ref 40–120)
ALP SERPL-CCNC: 110 U/L — SIGNIFICANT CHANGE UP (ref 40–120)
ALP SERPL-CCNC: 111 U/L — SIGNIFICANT CHANGE UP (ref 40–120)
ALP SERPL-CCNC: 112 U/L — SIGNIFICANT CHANGE UP (ref 40–120)
ALP SERPL-CCNC: 113 U/L — SIGNIFICANT CHANGE UP (ref 40–120)
ALP SERPL-CCNC: 115 U/L — SIGNIFICANT CHANGE UP (ref 40–120)
ALP SERPL-CCNC: 116 U/L — SIGNIFICANT CHANGE UP (ref 40–120)
ALP SERPL-CCNC: 116 U/L — SIGNIFICANT CHANGE UP (ref 40–120)
ALP SERPL-CCNC: 117 U/L — SIGNIFICANT CHANGE UP (ref 40–120)
ALP SERPL-CCNC: 123 U/L — HIGH (ref 40–120)
ALP SERPL-CCNC: 125 U/L — HIGH (ref 40–120)
ALP SERPL-CCNC: 130 U/L — HIGH (ref 40–120)
ALP SERPL-CCNC: 130 U/L — HIGH (ref 40–120)
ALP SERPL-CCNC: 132 U/L — HIGH (ref 40–120)
ALP SERPL-CCNC: 140 U/L — HIGH (ref 40–120)
ALP SERPL-CCNC: 142 U/L — HIGH (ref 40–120)
ALP SERPL-CCNC: 142 U/L — HIGH (ref 40–120)
ALP SERPL-CCNC: 143 U/L — HIGH (ref 40–120)
ALP SERPL-CCNC: 145 U/L — HIGH (ref 40–120)
ALP SERPL-CCNC: 148 U/L — HIGH (ref 40–120)
ALP SERPL-CCNC: 149 U/L — HIGH (ref 40–120)
ALP SERPL-CCNC: 152 U/L — HIGH (ref 40–120)
ALP SERPL-CCNC: 162 U/L — HIGH (ref 40–120)
ALP SERPL-CCNC: 165 U/L — HIGH (ref 40–120)
ALP SERPL-CCNC: 170 U/L — HIGH (ref 40–120)
ALP SERPL-CCNC: 185 U/L — HIGH (ref 40–120)
ALP SERPL-CCNC: 191 U/L — HIGH (ref 40–120)
ALP SERPL-CCNC: 200 U/L — HIGH (ref 40–120)
ALP SERPL-CCNC: 204 U/L — HIGH (ref 40–120)
ALP SERPL-CCNC: 216 U/L — HIGH (ref 40–120)
ALP SERPL-CCNC: 235 U/L — HIGH (ref 40–120)
ALP SERPL-CCNC: 237 U/L — HIGH (ref 40–120)
ALP SERPL-CCNC: 242 U/L — HIGH (ref 40–120)
ALP SERPL-CCNC: 244 U/L — HIGH (ref 40–120)
ALP SERPL-CCNC: 245 U/L — HIGH (ref 40–120)
ALP SERPL-CCNC: 245 U/L — HIGH (ref 40–120)
ALP SERPL-CCNC: 257 U/L — HIGH (ref 40–120)
ALP SERPL-CCNC: 257 U/L — HIGH (ref 40–120)
ALP SERPL-CCNC: 259 U/L — HIGH (ref 40–120)
ALP SERPL-CCNC: 261 U/L — HIGH (ref 40–120)
ALP SERPL-CCNC: 262 U/L — HIGH (ref 40–120)
ALP SERPL-CCNC: 265 U/L — HIGH (ref 40–120)
ALP SERPL-CCNC: 271 U/L — HIGH (ref 40–120)
ALP SERPL-CCNC: 271 U/L — HIGH (ref 40–120)
ALP SERPL-CCNC: 273 U/L — HIGH (ref 40–120)
ALP SERPL-CCNC: 276 U/L — HIGH (ref 40–120)
ALP SERPL-CCNC: 276 U/L — HIGH (ref 40–120)
ALP SERPL-CCNC: 279 U/L — HIGH (ref 40–120)
ALP SERPL-CCNC: 282 U/L — HIGH (ref 40–120)
ALP SERPL-CCNC: 283 U/L — HIGH (ref 40–120)
ALP SERPL-CCNC: 285 U/L — HIGH (ref 40–120)
ALP SERPL-CCNC: 297 U/L — HIGH (ref 40–120)
ALP SERPL-CCNC: 304 U/L — HIGH (ref 40–120)
ALP SERPL-CCNC: 316 U/L — HIGH (ref 40–120)
ALP SERPL-CCNC: 333 U/L — HIGH (ref 40–120)
ALP SERPL-CCNC: 359 U/L — HIGH (ref 40–120)
ALP SERPL-CCNC: 83 U/L — SIGNIFICANT CHANGE UP (ref 40–120)
ALP SERPL-CCNC: 85 U/L — SIGNIFICANT CHANGE UP (ref 40–120)
ALP SERPL-CCNC: 87 U/L — SIGNIFICANT CHANGE UP (ref 40–120)
ALP SERPL-CCNC: 89 U/L — SIGNIFICANT CHANGE UP (ref 40–120)
ALP SERPL-CCNC: 90 U/L — SIGNIFICANT CHANGE UP (ref 40–120)
ALP SERPL-CCNC: 91 U/L — SIGNIFICANT CHANGE UP (ref 40–120)
ALP SERPL-CCNC: 92 U/L — SIGNIFICANT CHANGE UP (ref 40–120)
ALP SERPL-CCNC: 96 U/L — SIGNIFICANT CHANGE UP (ref 40–120)
ALPHA1 GLOB SERPL ELPH-MCNC: 0.4 G/DL — SIGNIFICANT CHANGE UP (ref 0.1–0.4)
ALPHA2 GLOB SERPL ELPH-MCNC: 0.3 G/DL — LOW (ref 0.5–1)
ALT FLD-CCNC: 105 U/L DA — HIGH (ref 10–60)
ALT FLD-CCNC: 105 U/L DA — HIGH (ref 10–60)
ALT FLD-CCNC: 21 U/L DA — SIGNIFICANT CHANGE UP (ref 10–60)
ALT FLD-CCNC: 23 U/L DA — SIGNIFICANT CHANGE UP (ref 10–60)
ALT FLD-CCNC: 24 U/L DA — SIGNIFICANT CHANGE UP (ref 10–60)
ALT FLD-CCNC: 26 U/L DA — SIGNIFICANT CHANGE UP (ref 10–60)
ALT FLD-CCNC: 26 U/L DA — SIGNIFICANT CHANGE UP (ref 10–60)
ALT FLD-CCNC: 27 U/L DA — SIGNIFICANT CHANGE UP (ref 10–60)
ALT FLD-CCNC: 28 U/L DA — SIGNIFICANT CHANGE UP (ref 10–60)
ALT FLD-CCNC: 30 U/L DA — SIGNIFICANT CHANGE UP (ref 10–60)
ALT FLD-CCNC: 32 U/L DA — SIGNIFICANT CHANGE UP (ref 10–60)
ALT FLD-CCNC: 32 U/L DA — SIGNIFICANT CHANGE UP (ref 10–60)
ALT FLD-CCNC: 33 U/L DA — SIGNIFICANT CHANGE UP (ref 10–60)
ALT FLD-CCNC: 34 U/L DA — SIGNIFICANT CHANGE UP (ref 10–60)
ALT FLD-CCNC: 35 U/L DA — SIGNIFICANT CHANGE UP (ref 10–60)
ALT FLD-CCNC: 36 U/L DA — SIGNIFICANT CHANGE UP (ref 10–60)
ALT FLD-CCNC: 36 U/L DA — SIGNIFICANT CHANGE UP (ref 10–60)
ALT FLD-CCNC: 38 U/L DA — SIGNIFICANT CHANGE UP (ref 10–60)
ALT FLD-CCNC: 39 U/L DA — SIGNIFICANT CHANGE UP (ref 10–60)
ALT FLD-CCNC: 40 U/L DA — SIGNIFICANT CHANGE UP (ref 10–60)
ALT FLD-CCNC: 41 U/L DA — SIGNIFICANT CHANGE UP (ref 10–60)
ALT FLD-CCNC: 42 U/L DA — SIGNIFICANT CHANGE UP (ref 10–60)
ALT FLD-CCNC: 42 U/L DA — SIGNIFICANT CHANGE UP (ref 10–60)
ALT FLD-CCNC: 45 U/L DA — SIGNIFICANT CHANGE UP (ref 10–60)
ALT FLD-CCNC: 45 U/L DA — SIGNIFICANT CHANGE UP (ref 10–60)
ALT FLD-CCNC: 46 U/L DA — SIGNIFICANT CHANGE UP (ref 10–60)
ALT FLD-CCNC: 46 U/L DA — SIGNIFICANT CHANGE UP (ref 10–60)
ALT FLD-CCNC: 47 U/L DA — SIGNIFICANT CHANGE UP (ref 10–60)
ALT FLD-CCNC: 48 U/L DA — SIGNIFICANT CHANGE UP (ref 10–60)
ALT FLD-CCNC: 49 U/L DA — SIGNIFICANT CHANGE UP (ref 10–60)
ALT FLD-CCNC: 50 U/L DA — SIGNIFICANT CHANGE UP (ref 10–60)
ALT FLD-CCNC: 51 U/L DA — SIGNIFICANT CHANGE UP (ref 10–60)
ALT FLD-CCNC: 53 U/L DA — SIGNIFICANT CHANGE UP (ref 10–60)
ALT FLD-CCNC: 53 U/L DA — SIGNIFICANT CHANGE UP (ref 10–60)
ALT FLD-CCNC: 55 U/L DA — SIGNIFICANT CHANGE UP (ref 10–60)
ALT FLD-CCNC: 58 U/L DA — SIGNIFICANT CHANGE UP (ref 10–60)
ALT FLD-CCNC: 60 U/L DA — SIGNIFICANT CHANGE UP (ref 10–60)
ALT FLD-CCNC: 60 U/L DA — SIGNIFICANT CHANGE UP (ref 10–60)
ALT FLD-CCNC: 61 U/L DA — HIGH (ref 10–60)
ALT FLD-CCNC: 62 U/L DA — HIGH (ref 10–60)
ALT FLD-CCNC: 62 U/L DA — HIGH (ref 10–60)
ALT FLD-CCNC: 63 U/L DA — HIGH (ref 10–60)
ALT FLD-CCNC: 63 U/L DA — HIGH (ref 10–60)
ALT FLD-CCNC: 64 U/L DA — HIGH (ref 10–60)
ALT FLD-CCNC: 66 U/L DA — HIGH (ref 10–60)
ALT FLD-CCNC: 66 U/L DA — HIGH (ref 10–60)
ALT FLD-CCNC: 67 U/L DA — HIGH (ref 10–60)
ALT FLD-CCNC: 70 U/L DA — HIGH (ref 10–60)
ALT FLD-CCNC: 71 U/L DA — HIGH (ref 10–60)
ALT FLD-CCNC: 72 U/L DA — HIGH (ref 10–60)
ALT FLD-CCNC: 72 U/L DA — HIGH (ref 10–60)
ALT FLD-CCNC: 75 U/L DA — HIGH (ref 10–60)
ALT FLD-CCNC: 77 U/L DA — HIGH (ref 10–60)
ALT FLD-CCNC: 78 U/L DA — HIGH (ref 10–60)
ALT FLD-CCNC: 79 U/L DA — HIGH (ref 10–60)
ALT FLD-CCNC: 79 U/L DA — HIGH (ref 10–60)
ALT FLD-CCNC: 80 U/L DA — HIGH (ref 10–60)
ALT FLD-CCNC: 81 U/L DA — HIGH (ref 10–60)
ALT FLD-CCNC: 85 U/L DA — HIGH (ref 10–60)
ALT FLD-CCNC: 87 U/L DA — HIGH (ref 10–60)
ALT FLD-CCNC: 88 U/L DA — HIGH (ref 10–60)
ALT FLD-CCNC: 94 U/L DA — HIGH (ref 10–60)
ALT FLD-CCNC: 99 U/L DA — HIGH (ref 10–60)
AMMONIA BLD-MCNC: 103 UMOL/L — HIGH (ref 11–32)
AMMONIA BLD-MCNC: 116 UMOL/L — HIGH (ref 11–32)
AMMONIA BLD-MCNC: 125 UMOL/L — HIGH (ref 11–32)
AMMONIA BLD-MCNC: 126 UMOL/L — HIGH (ref 11–32)
AMMONIA BLD-MCNC: 129 UMOL/L — HIGH (ref 11–32)
AMMONIA BLD-MCNC: 130 UMOL/L — HIGH (ref 11–32)
AMMONIA BLD-MCNC: 131 UMOL/L — HIGH (ref 11–32)
AMMONIA BLD-MCNC: 151 UMOL/L — HIGH (ref 11–32)
AMMONIA BLD-MCNC: 54 UMOL/L — HIGH (ref 11–32)
AMMONIA BLD-MCNC: 68 UMOL/L — HIGH (ref 11–32)
AMMONIA BLD-MCNC: 77 UMOL/L — HIGH (ref 11–32)
AMMONIA BLD-MCNC: 86 UMOL/L — HIGH (ref 11–32)
AMMONIA BLD-MCNC: 90 UMOL/L — HIGH (ref 11–32)
AMMONIA BLD-MCNC: 90 UMOL/L — HIGH (ref 11–32)
AMMONIA BLD-MCNC: <10 UMOL/L — LOW (ref 11–32)
ANA TITR SER: NEGATIVE — SIGNIFICANT CHANGE UP
ANION GAP SERPL CALC-SCNC: 1 MMOL/L — LOW (ref 5–17)
ANION GAP SERPL CALC-SCNC: 10 MMOL/L — SIGNIFICANT CHANGE UP (ref 5–17)
ANION GAP SERPL CALC-SCNC: 11 MMOL/L — SIGNIFICANT CHANGE UP (ref 5–17)
ANION GAP SERPL CALC-SCNC: 11 MMOL/L — SIGNIFICANT CHANGE UP (ref 5–17)
ANION GAP SERPL CALC-SCNC: 14 MMOL/L — SIGNIFICANT CHANGE UP (ref 5–17)
ANION GAP SERPL CALC-SCNC: 15 MMOL/L — SIGNIFICANT CHANGE UP (ref 5–17)
ANION GAP SERPL CALC-SCNC: 2 MMOL/L — LOW (ref 5–17)
ANION GAP SERPL CALC-SCNC: 3 MMOL/L — LOW (ref 5–17)
ANION GAP SERPL CALC-SCNC: 4 MMOL/L — LOW (ref 5–17)
ANION GAP SERPL CALC-SCNC: 5 MMOL/L — SIGNIFICANT CHANGE UP (ref 5–17)
ANION GAP SERPL CALC-SCNC: 6 MMOL/L — SIGNIFICANT CHANGE UP (ref 5–17)
ANION GAP SERPL CALC-SCNC: 7 MMOL/L — SIGNIFICANT CHANGE UP (ref 5–17)
ANION GAP SERPL CALC-SCNC: 8 MMOL/L — SIGNIFICANT CHANGE UP (ref 5–17)
ANION GAP SERPL CALC-SCNC: 9 MMOL/L — SIGNIFICANT CHANGE UP (ref 5–17)
ANISOCYTOSIS BLD QL: SIGNIFICANT CHANGE UP
ANISOCYTOSIS BLD QL: SLIGHT — SIGNIFICANT CHANGE UP
APPEARANCE UR: CLEAR — SIGNIFICANT CHANGE UP
APTT 50/50 2HOUR INCUB: 32.3 SEC — SIGNIFICANT CHANGE UP (ref 27.5–36.5)
APTT 50/50 2HOUR INCUB: 33.5 SEC — SIGNIFICANT CHANGE UP (ref 27.5–36.5)
APTT BLD: 30.4 SEC — SIGNIFICANT CHANGE UP (ref 27.5–36.3)
APTT BLD: 31.1 SEC — SIGNIFICANT CHANGE UP (ref 27.5–36.5)
APTT BLD: 31.1 SEC — SIGNIFICANT CHANGE UP (ref 27.5–36.5)
APTT BLD: 32.1 SEC — SIGNIFICANT CHANGE UP (ref 27.5–35.5)
APTT BLD: 36.1 SEC — SIGNIFICANT CHANGE UP (ref 27.5–36.3)
APTT BLD: 36.9 SEC — HIGH (ref 27.5–35.5)
APTT BLD: 39.8 SEC — HIGH (ref 27.5–35.5)
APTT BLD: 41.4 SEC — HIGH (ref 27.5–36.3)
APTT BLD: 42 SEC — HIGH (ref 27.5–35.5)
APTT BLD: 42.5 SEC — HIGH (ref 27.5–36.3)
APTT BLD: 45.9 SEC — HIGH (ref 27.5–35.5)
APTT BLD: 47.5 SEC — HIGH (ref 27.5–35.5)
APTT BLD: 47.6 SEC — HIGH (ref 27.5–35.5)
APTT BLD: 47.9 SEC — HIGH (ref 27.5–36.3)
APTT BLD: 48.2 SEC — HIGH (ref 27.5–36.3)
APTT BLD: 52.2 SEC — HIGH (ref 27.5–36.3)
APTT BLD: 52.3 SEC — HIGH (ref 27.5–35.5)
APTT BLD: 53.7 SEC — HIGH (ref 27.5–35.5)
APTT BLD: 56.5 SEC — HIGH (ref 27.5–35.5)
APTT BLD: 65.8 SEC — HIGH (ref 27.5–35.5)
APTT BLD: >200 SEC — CRITICAL HIGH (ref 27.5–35.5)
AST SERPL-CCNC: 104 U/L — HIGH (ref 10–40)
AST SERPL-CCNC: 104 U/L — HIGH (ref 10–40)
AST SERPL-CCNC: 105 U/L — HIGH (ref 10–40)
AST SERPL-CCNC: 108 U/L — HIGH (ref 10–40)
AST SERPL-CCNC: 112 U/L — HIGH (ref 10–40)
AST SERPL-CCNC: 114 U/L — HIGH (ref 10–40)
AST SERPL-CCNC: 120 U/L — HIGH (ref 10–40)
AST SERPL-CCNC: 123 U/L — HIGH (ref 10–40)
AST SERPL-CCNC: 124 U/L — HIGH (ref 10–40)
AST SERPL-CCNC: 131 U/L — HIGH (ref 10–40)
AST SERPL-CCNC: 133 U/L — HIGH (ref 10–40)
AST SERPL-CCNC: 144 U/L — HIGH (ref 10–40)
AST SERPL-CCNC: 146 U/L — HIGH (ref 10–40)
AST SERPL-CCNC: 147 U/L — HIGH (ref 10–40)
AST SERPL-CCNC: 151 U/L — HIGH (ref 10–40)
AST SERPL-CCNC: 159 U/L — HIGH (ref 10–40)
AST SERPL-CCNC: 161 U/L — HIGH (ref 10–40)
AST SERPL-CCNC: 165 U/L — HIGH (ref 10–40)
AST SERPL-CCNC: 175 U/L — HIGH (ref 10–40)
AST SERPL-CCNC: 18 U/L — SIGNIFICANT CHANGE UP (ref 10–40)
AST SERPL-CCNC: 188 U/L — HIGH (ref 10–40)
AST SERPL-CCNC: 193 U/L — HIGH (ref 10–40)
AST SERPL-CCNC: 208 U/L — HIGH (ref 10–40)
AST SERPL-CCNC: 228 U/L — HIGH (ref 10–40)
AST SERPL-CCNC: 233 U/L — HIGH (ref 10–40)
AST SERPL-CCNC: 24 U/L — SIGNIFICANT CHANGE UP (ref 10–40)
AST SERPL-CCNC: 248 U/L — HIGH (ref 10–40)
AST SERPL-CCNC: 28 U/L — SIGNIFICANT CHANGE UP (ref 10–40)
AST SERPL-CCNC: 30 U/L — SIGNIFICANT CHANGE UP (ref 10–40)
AST SERPL-CCNC: 30 U/L — SIGNIFICANT CHANGE UP (ref 10–40)
AST SERPL-CCNC: 34 U/L — SIGNIFICANT CHANGE UP (ref 10–40)
AST SERPL-CCNC: 36 U/L — SIGNIFICANT CHANGE UP (ref 10–40)
AST SERPL-CCNC: 37 U/L — SIGNIFICANT CHANGE UP (ref 10–40)
AST SERPL-CCNC: 37 U/L — SIGNIFICANT CHANGE UP (ref 10–40)
AST SERPL-CCNC: 39 U/L — SIGNIFICANT CHANGE UP (ref 10–40)
AST SERPL-CCNC: 40 U/L — SIGNIFICANT CHANGE UP (ref 10–40)
AST SERPL-CCNC: 42 U/L — HIGH (ref 10–40)
AST SERPL-CCNC: 43 U/L — HIGH (ref 10–40)
AST SERPL-CCNC: 45 U/L — HIGH (ref 10–40)
AST SERPL-CCNC: 45 U/L — HIGH (ref 10–40)
AST SERPL-CCNC: 46 U/L — HIGH (ref 10–40)
AST SERPL-CCNC: 48 U/L — HIGH (ref 10–40)
AST SERPL-CCNC: 49 U/L — HIGH (ref 10–40)
AST SERPL-CCNC: 50 U/L — HIGH (ref 10–40)
AST SERPL-CCNC: 50 U/L — HIGH (ref 10–40)
AST SERPL-CCNC: 51 U/L — HIGH (ref 10–40)
AST SERPL-CCNC: 52 U/L — HIGH (ref 10–40)
AST SERPL-CCNC: 52 U/L — HIGH (ref 10–40)
AST SERPL-CCNC: 55 U/L — HIGH (ref 10–40)
AST SERPL-CCNC: 57 U/L — HIGH (ref 10–40)
AST SERPL-CCNC: 58 U/L — HIGH (ref 10–40)
AST SERPL-CCNC: 58 U/L — HIGH (ref 10–40)
AST SERPL-CCNC: 62 U/L — HIGH (ref 10–40)
AST SERPL-CCNC: 64 U/L — HIGH (ref 10–40)
AST SERPL-CCNC: 65 U/L — HIGH (ref 10–40)
AST SERPL-CCNC: 66 U/L — HIGH (ref 10–40)
AST SERPL-CCNC: 67 U/L — HIGH (ref 10–40)
AST SERPL-CCNC: 67 U/L — HIGH (ref 10–40)
AST SERPL-CCNC: 68 U/L — HIGH (ref 10–40)
AST SERPL-CCNC: 68 U/L — HIGH (ref 10–40)
AST SERPL-CCNC: 70 U/L — HIGH (ref 10–40)
AST SERPL-CCNC: 72 U/L — HIGH (ref 10–40)
AST SERPL-CCNC: 74 U/L — HIGH (ref 10–40)
AST SERPL-CCNC: 74 U/L — HIGH (ref 10–40)
AST SERPL-CCNC: 75 U/L — HIGH (ref 10–40)
AST SERPL-CCNC: 76 U/L — HIGH (ref 10–40)
AST SERPL-CCNC: 85 U/L — HIGH (ref 10–40)
AST SERPL-CCNC: 89 U/L — HIGH (ref 10–40)
AST SERPL-CCNC: SIGNIFICANT CHANGE UP U/L (ref 10–40)
AUTO DIFF PNL BLD: NEGATIVE — SIGNIFICANT CHANGE UP
B-GLOBULIN SERPL ELPH-MCNC: 0.4 G/DL — LOW (ref 0.5–1)
BACTERIA # UR AUTO: ABNORMAL /HPF
BASE EXCESS BLDA CALC-SCNC: -0.9 MMOL/L — SIGNIFICANT CHANGE UP (ref -2–2)
BASE EXCESS BLDA CALC-SCNC: -1.3 MMOL/L — SIGNIFICANT CHANGE UP (ref -2–2)
BASE EXCESS BLDA CALC-SCNC: -1.6 MMOL/L — SIGNIFICANT CHANGE UP (ref -2–2)
BASE EXCESS BLDA CALC-SCNC: -10.3 MMOL/L — LOW (ref -2–2)
BASE EXCESS BLDA CALC-SCNC: -13 MMOL/L — LOW (ref -2–2)
BASE EXCESS BLDA CALC-SCNC: -13.8 MMOL/L — LOW (ref -2–2)
BASE EXCESS BLDA CALC-SCNC: -2.4 MMOL/L — LOW (ref -2–2)
BASE EXCESS BLDA CALC-SCNC: -2.9 MMOL/L — LOW (ref -2–2)
BASE EXCESS BLDA CALC-SCNC: -3.2 MMOL/L — LOW (ref -2–2)
BASE EXCESS BLDA CALC-SCNC: -3.3 MMOL/L — LOW (ref -2–2)
BASE EXCESS BLDA CALC-SCNC: -3.6 MMOL/L — LOW (ref -2–2)
BASE EXCESS BLDA CALC-SCNC: -3.8 MMOL/L — LOW (ref -2–2)
BASE EXCESS BLDA CALC-SCNC: -3.9 MMOL/L — LOW (ref -2–2)
BASE EXCESS BLDA CALC-SCNC: -4 MMOL/L — LOW (ref -2–2)
BASE EXCESS BLDA CALC-SCNC: -6.9 MMOL/L — LOW (ref -2–2)
BASE EXCESS BLDA CALC-SCNC: -8.4 MMOL/L — LOW (ref -2–2)
BASE EXCESS BLDA CALC-SCNC: 1.3 MMOL/L — SIGNIFICANT CHANGE UP (ref -2–2)
BASE EXCESS BLDA CALC-SCNC: 2 MMOL/L — SIGNIFICANT CHANGE UP (ref -2–2)
BASE EXCESS BLDV CALC-SCNC: -14.9 MMOL/L — LOW (ref -2–2)
BASE EXCESS BLDV CALC-SCNC: -2.9 MMOL/L — LOW (ref -2–2)
BASE EXCESS BLDV CALC-SCNC: -4.3 MMOL/L — LOW (ref -2–2)
BASO STIPL BLD QL SMEAR: PRESENT — SIGNIFICANT CHANGE UP
BASOPHILS # BLD AUTO: 0 K/UL — SIGNIFICANT CHANGE UP (ref 0–0.2)
BASOPHILS # BLD AUTO: 0.01 K/UL — SIGNIFICANT CHANGE UP (ref 0–0.2)
BASOPHILS # BLD AUTO: 0.01 K/UL — SIGNIFICANT CHANGE UP (ref 0–0.2)
BASOPHILS # BLD AUTO: 0.02 K/UL — SIGNIFICANT CHANGE UP (ref 0–0.2)
BASOPHILS # BLD AUTO: 0.04 K/UL — SIGNIFICANT CHANGE UP (ref 0–0.2)
BASOPHILS # BLD AUTO: 0.05 K/UL — SIGNIFICANT CHANGE UP (ref 0–0.2)
BASOPHILS # BLD AUTO: 0.06 K/UL — SIGNIFICANT CHANGE UP (ref 0–0.2)
BASOPHILS # BLD AUTO: 0.07 K/UL — SIGNIFICANT CHANGE UP (ref 0–0.2)
BASOPHILS # BLD AUTO: 0.08 K/UL — SIGNIFICANT CHANGE UP (ref 0–0.2)
BASOPHILS # BLD AUTO: 0.09 K/UL — SIGNIFICANT CHANGE UP (ref 0–0.2)
BASOPHILS # BLD AUTO: 0.1 K/UL — SIGNIFICANT CHANGE UP (ref 0–0.2)
BASOPHILS # BLD AUTO: 0.1 K/UL — SIGNIFICANT CHANGE UP (ref 0–0.2)
BASOPHILS NFR BLD AUTO: 0 % — SIGNIFICANT CHANGE UP (ref 0–2)
BASOPHILS NFR BLD AUTO: 0.2 % — SIGNIFICANT CHANGE UP (ref 0–2)
BASOPHILS NFR BLD AUTO: 0.3 % — SIGNIFICANT CHANGE UP (ref 0–2)
BASOPHILS NFR BLD AUTO: 0.4 % — SIGNIFICANT CHANGE UP (ref 0–2)
BASOPHILS NFR BLD AUTO: 0.5 % — SIGNIFICANT CHANGE UP (ref 0–2)
BASOPHILS NFR BLD AUTO: 0.6 % — SIGNIFICANT CHANGE UP (ref 0–2)
BASOPHILS NFR BLD AUTO: 0.7 % — SIGNIFICANT CHANGE UP (ref 0–2)
BASOPHILS NFR BLD AUTO: 0.8 % — SIGNIFICANT CHANGE UP (ref 0–2)
BASOPHILS NFR BLD AUTO: 0.9 % — SIGNIFICANT CHANGE UP (ref 0–2)
BASOPHILS NFR BLD AUTO: 1 % — SIGNIFICANT CHANGE UP (ref 0–2)
BILIRUB DIRECT SERPL-MCNC: 0.8 MG/DL — HIGH (ref 0–0.2)
BILIRUB DIRECT SERPL-MCNC: 0.9 MG/DL — HIGH (ref 0–0.2)
BILIRUB INDIRECT FLD-MCNC: 0.5 MG/DL — SIGNIFICANT CHANGE UP (ref 0.2–1)
BILIRUB SERPL-MCNC: 0.5 MG/DL — SIGNIFICANT CHANGE UP (ref 0.2–1.2)
BILIRUB SERPL-MCNC: 0.6 MG/DL — SIGNIFICANT CHANGE UP (ref 0.2–1.2)
BILIRUB SERPL-MCNC: 0.6 MG/DL — SIGNIFICANT CHANGE UP (ref 0.2–1.2)
BILIRUB SERPL-MCNC: 0.9 MG/DL — SIGNIFICANT CHANGE UP (ref 0.2–1.2)
BILIRUB SERPL-MCNC: 1 MG/DL — SIGNIFICANT CHANGE UP (ref 0.2–1.2)
BILIRUB SERPL-MCNC: 1.1 MG/DL — SIGNIFICANT CHANGE UP (ref 0.2–1.2)
BILIRUB SERPL-MCNC: 1.2 MG/DL — SIGNIFICANT CHANGE UP (ref 0.2–1.2)
BILIRUB SERPL-MCNC: 1.3 MG/DL — HIGH (ref 0.2–1.2)
BILIRUB SERPL-MCNC: 1.4 MG/DL — HIGH (ref 0.2–1.2)
BILIRUB SERPL-MCNC: 1.5 MG/DL — HIGH (ref 0.2–1.2)
BILIRUB SERPL-MCNC: 1.6 MG/DL — HIGH (ref 0.2–1.2)
BILIRUB SERPL-MCNC: 1.7 MG/DL — HIGH (ref 0.2–1.2)
BILIRUB SERPL-MCNC: 1.7 MG/DL — HIGH (ref 0.2–1.2)
BILIRUB SERPL-MCNC: 1.8 MG/DL — HIGH (ref 0.2–1.2)
BILIRUB SERPL-MCNC: 1.9 MG/DL — HIGH (ref 0.2–1.2)
BILIRUB SERPL-MCNC: 2 MG/DL — HIGH (ref 0.2–1.2)
BILIRUB SERPL-MCNC: 2 MG/DL — HIGH (ref 0.2–1.2)
BILIRUB SERPL-MCNC: 2.1 MG/DL — HIGH (ref 0.2–1.2)
BILIRUB SERPL-MCNC: 2.2 MG/DL — HIGH (ref 0.2–1.2)
BILIRUB SERPL-MCNC: 2.3 MG/DL — HIGH (ref 0.2–1.2)
BILIRUB SERPL-MCNC: 2.4 MG/DL — HIGH (ref 0.2–1.2)
BILIRUB SERPL-MCNC: 2.5 MG/DL — HIGH (ref 0.2–1.2)
BILIRUB SERPL-MCNC: 2.5 MG/DL — HIGH (ref 0.2–1.2)
BILIRUB SERPL-MCNC: 2.6 MG/DL — HIGH (ref 0.2–1.2)
BILIRUB SERPL-MCNC: 2.7 MG/DL — HIGH (ref 0.2–1.2)
BILIRUB SERPL-MCNC: 2.8 MG/DL — HIGH (ref 0.2–1.2)
BILIRUB SERPL-MCNC: 2.8 MG/DL — HIGH (ref 0.2–1.2)
BILIRUB SERPL-MCNC: 2.9 MG/DL — HIGH (ref 0.2–1.2)
BILIRUB SERPL-MCNC: 3 MG/DL — HIGH (ref 0.2–1.2)
BILIRUB SERPL-MCNC: 3 MG/DL — HIGH (ref 0.2–1.2)
BILIRUB SERPL-MCNC: 3.1 MG/DL — HIGH (ref 0.2–1.2)
BILIRUB SERPL-MCNC: 3.8 MG/DL — HIGH (ref 0.2–1.2)
BILIRUB SERPL-MCNC: 4.2 MG/DL — HIGH (ref 0.2–1.2)
BILIRUB SERPL-MCNC: 4.4 MG/DL — HIGH (ref 0.2–1.2)
BILIRUB SERPL-MCNC: 5 MG/DL — HIGH (ref 0.2–1.2)
BILIRUB SERPL-MCNC: 5.3 MG/DL — HIGH (ref 0.2–1.2)
BILIRUB SERPL-MCNC: 5.5 MG/DL — HIGH (ref 0.2–1.2)
BILIRUB SERPL-MCNC: 5.5 MG/DL — HIGH (ref 0.2–1.2)
BILIRUB SERPL-MCNC: 5.6 MG/DL — HIGH (ref 0.2–1.2)
BILIRUB SERPL-MCNC: 5.6 MG/DL — HIGH (ref 0.2–1.2)
BILIRUB SERPL-MCNC: 5.8 MG/DL — HIGH (ref 0.2–1.2)
BILIRUB SERPL-MCNC: 6 MG/DL — HIGH (ref 0.2–1.2)
BILIRUB SERPL-MCNC: 6.1 MG/DL — HIGH (ref 0.2–1.2)
BILIRUB SERPL-MCNC: 6.2 MG/DL — HIGH (ref 0.2–1.2)
BILIRUB SERPL-MCNC: 6.8 MG/DL — HIGH (ref 0.2–1.2)
BILIRUB SERPL-MCNC: 7.2 MG/DL — HIGH (ref 0.2–1.2)
BILIRUB SERPL-MCNC: 8.2 MG/DL — HIGH (ref 0.2–1.2)
BILIRUB SERPL-MCNC: 8.6 MG/DL — HIGH (ref 0.2–1.2)
BILIRUB SERPL-MCNC: 9.2 MG/DL — HIGH (ref 0.2–1.2)
BILIRUB UR-MCNC: NEGATIVE — SIGNIFICANT CHANGE UP
BLD GP AB SCN SERPL QL: SIGNIFICANT CHANGE UP
BLOOD GAS COMMENTS ARTERIAL: SIGNIFICANT CHANGE UP
BLOOD GAS COMMENTS, VENOUS: SIGNIFICANT CHANGE UP
BUN SERPL-MCNC: 10 MG/DL — SIGNIFICANT CHANGE UP (ref 7–18)
BUN SERPL-MCNC: 11 MG/DL — SIGNIFICANT CHANGE UP (ref 7–18)
BUN SERPL-MCNC: 12 MG/DL — SIGNIFICANT CHANGE UP (ref 7–18)
BUN SERPL-MCNC: 13 MG/DL — SIGNIFICANT CHANGE UP (ref 7–18)
BUN SERPL-MCNC: 14 MG/DL — SIGNIFICANT CHANGE UP (ref 7–18)
BUN SERPL-MCNC: 15 MG/DL — SIGNIFICANT CHANGE UP (ref 7–18)
BUN SERPL-MCNC: 16 MG/DL — SIGNIFICANT CHANGE UP (ref 7–18)
BUN SERPL-MCNC: 17 MG/DL — SIGNIFICANT CHANGE UP (ref 7–18)
BUN SERPL-MCNC: 18 MG/DL — SIGNIFICANT CHANGE UP (ref 7–18)
BUN SERPL-MCNC: 19 MG/DL — HIGH (ref 7–18)
BUN SERPL-MCNC: 20 MG/DL — HIGH (ref 7–18)
BUN SERPL-MCNC: 21 MG/DL — HIGH (ref 7–18)
BUN SERPL-MCNC: 22 MG/DL — HIGH (ref 7–18)
BUN SERPL-MCNC: 22 MG/DL — HIGH (ref 7–18)
BUN SERPL-MCNC: 24 MG/DL — HIGH (ref 7–18)
BUN SERPL-MCNC: 24 MG/DL — HIGH (ref 7–18)
BUN SERPL-MCNC: 25 MG/DL — HIGH (ref 7–18)
BUN SERPL-MCNC: 25 MG/DL — HIGH (ref 7–18)
BUN SERPL-MCNC: 27 MG/DL — HIGH (ref 7–18)
BUN SERPL-MCNC: 27 MG/DL — HIGH (ref 7–18)
BUN SERPL-MCNC: 28 MG/DL — HIGH (ref 7–18)
BUN SERPL-MCNC: 29 MG/DL — HIGH (ref 7–18)
BUN SERPL-MCNC: 3 MG/DL — LOW (ref 7–18)
BUN SERPL-MCNC: 30 MG/DL — HIGH (ref 7–18)
BUN SERPL-MCNC: 31 MG/DL — HIGH (ref 7–18)
BUN SERPL-MCNC: 32 MG/DL — HIGH (ref 7–18)
BUN SERPL-MCNC: 33 MG/DL — HIGH (ref 7–18)
BUN SERPL-MCNC: 34 MG/DL — HIGH (ref 7–18)
BUN SERPL-MCNC: 35 MG/DL — HIGH (ref 7–18)
BUN SERPL-MCNC: 36 MG/DL — HIGH (ref 7–18)
BUN SERPL-MCNC: 37 MG/DL — HIGH (ref 7–18)
BUN SERPL-MCNC: 4 MG/DL — LOW (ref 7–18)
BUN SERPL-MCNC: 4 MG/DL — LOW (ref 7–18)
BUN SERPL-MCNC: 5 MG/DL — LOW (ref 7–18)
BUN SERPL-MCNC: 6 MG/DL — LOW (ref 7–18)
BUN SERPL-MCNC: 7 MG/DL — SIGNIFICANT CHANGE UP (ref 7–18)
BUN SERPL-MCNC: 9 MG/DL — SIGNIFICANT CHANGE UP (ref 7–18)
BUN SERPL-MCNC: SIGNIFICANT CHANGE UP MG/DL (ref 7–18)
BURR CELLS BLD QL SMEAR: PRESENT — SIGNIFICANT CHANGE UP
C DIFF BY PCR RESULT: SIGNIFICANT CHANGE UP
C DIFF TOX GENS STL QL NAA+PROBE: SIGNIFICANT CHANGE UP
C-ANCA SER-ACNC: NEGATIVE — SIGNIFICANT CHANGE UP
C3 SERPL-MCNC: 44 MG/DL — LOW (ref 81–157)
C4 SERPL-MCNC: 10 MG/DL — LOW (ref 13–39)
CALCIUM SERPL-MCNC: 5.4 MG/DL — CRITICAL LOW (ref 8.4–10.5)
CALCIUM SERPL-MCNC: 5.6 MG/DL — CRITICAL LOW (ref 8.4–10.5)
CALCIUM SERPL-MCNC: 5.8 MG/DL — CRITICAL LOW (ref 8.4–10.5)
CALCIUM SERPL-MCNC: 5.9 MG/DL — CRITICAL LOW (ref 8.4–10.5)
CALCIUM SERPL-MCNC: 6 MG/DL — CRITICAL LOW (ref 8.4–10.5)
CALCIUM SERPL-MCNC: 6.2 MG/DL — CRITICAL LOW (ref 8.4–10.5)
CALCIUM SERPL-MCNC: 6.6 MG/DL — LOW (ref 8.4–10.5)
CALCIUM SERPL-MCNC: 6.6 MG/DL — LOW (ref 8.4–10.5)
CALCIUM SERPL-MCNC: 6.7 MG/DL — LOW (ref 8.4–10.5)
CALCIUM SERPL-MCNC: 6.8 MG/DL — LOW (ref 8.4–10.5)
CALCIUM SERPL-MCNC: 7.1 MG/DL — LOW (ref 8.4–10.5)
CALCIUM SERPL-MCNC: 7.2 MG/DL — LOW (ref 8.4–10.5)
CALCIUM SERPL-MCNC: 7.2 MG/DL — LOW (ref 8.4–10.5)
CALCIUM SERPL-MCNC: 7.3 MG/DL — LOW (ref 8.4–10.5)
CALCIUM SERPL-MCNC: 7.4 MG/DL — LOW (ref 8.4–10.5)
CALCIUM SERPL-MCNC: 7.4 MG/DL — LOW (ref 8.4–10.5)
CALCIUM SERPL-MCNC: 7.5 MG/DL — LOW (ref 8.4–10.5)
CALCIUM SERPL-MCNC: 7.6 MG/DL — LOW (ref 8.4–10.5)
CALCIUM SERPL-MCNC: 7.7 MG/DL — LOW (ref 8.4–10.5)
CALCIUM SERPL-MCNC: 7.8 MG/DL — LOW (ref 8.4–10.5)
CALCIUM SERPL-MCNC: 7.9 MG/DL — LOW (ref 8.4–10.5)
CALCIUM SERPL-MCNC: 8 MG/DL — LOW (ref 8.4–10.5)
CALCIUM SERPL-MCNC: 8.1 MG/DL — LOW (ref 8.4–10.5)
CALCIUM SERPL-MCNC: 8.2 MG/DL — LOW (ref 8.4–10.5)
CALCIUM SERPL-MCNC: 8.3 MG/DL — LOW (ref 8.4–10.5)
CALCIUM SERPL-MCNC: 8.4 MG/DL — SIGNIFICANT CHANGE UP (ref 8.4–10.5)
CALCIUM SERPL-MCNC: 8.5 MG/DL — SIGNIFICANT CHANGE UP (ref 8.4–10.5)
CALCIUM SERPL-MCNC: 8.6 MG/DL — SIGNIFICANT CHANGE UP (ref 8.4–10.5)
CALCIUM SERPL-MCNC: 8.7 MG/DL — SIGNIFICANT CHANGE UP (ref 8.4–10.5)
CALCIUM SERPL-MCNC: 8.7 MG/DL — SIGNIFICANT CHANGE UP (ref 8.4–10.5)
CALCIUM SERPL-MCNC: 8.9 MG/DL — SIGNIFICANT CHANGE UP (ref 8.4–10.5)
CALCIUM SERPL-MCNC: SIGNIFICANT CHANGE UP MG/DL (ref 8.4–10.5)
CARDIOLIPIN AB SER-ACNC: NEGATIVE — SIGNIFICANT CHANGE UP
CARDIOLIPIN AB SER-ACNC: POSITIVE
CEA SERPL-MCNC: 7.2 NG/ML — HIGH (ref 0–3.8)
CENTROMERE AB SER-ACNC: <0.2 AI — SIGNIFICANT CHANGE UP
CHLORIDE SERPL-SCNC: 102 MMOL/L — SIGNIFICANT CHANGE UP (ref 96–108)
CHLORIDE SERPL-SCNC: 103 MMOL/L — SIGNIFICANT CHANGE UP (ref 96–108)
CHLORIDE SERPL-SCNC: 104 MMOL/L — SIGNIFICANT CHANGE UP (ref 96–108)
CHLORIDE SERPL-SCNC: 105 MMOL/L — SIGNIFICANT CHANGE UP (ref 96–108)
CHLORIDE SERPL-SCNC: 106 MMOL/L — SIGNIFICANT CHANGE UP (ref 96–108)
CHLORIDE SERPL-SCNC: 107 MMOL/L — SIGNIFICANT CHANGE UP (ref 96–108)
CHLORIDE SERPL-SCNC: 108 MMOL/L — SIGNIFICANT CHANGE UP (ref 96–108)
CHLORIDE SERPL-SCNC: 109 MMOL/L — HIGH (ref 96–108)
CHLORIDE SERPL-SCNC: 110 MMOL/L — HIGH (ref 96–108)
CHLORIDE SERPL-SCNC: 111 MMOL/L — HIGH (ref 96–108)
CHLORIDE SERPL-SCNC: 112 MMOL/L — HIGH (ref 96–108)
CHLORIDE SERPL-SCNC: 113 MMOL/L — HIGH (ref 96–108)
CHLORIDE SERPL-SCNC: 114 MMOL/L — HIGH (ref 96–108)
CHLORIDE SERPL-SCNC: 115 MMOL/L — HIGH (ref 96–108)
CHLORIDE SERPL-SCNC: 116 MMOL/L — HIGH (ref 96–108)
CHLORIDE SERPL-SCNC: 117 MMOL/L — HIGH (ref 96–108)
CHLORIDE SERPL-SCNC: 118 MMOL/L — HIGH (ref 96–108)
CHLORIDE SERPL-SCNC: 119 MMOL/L — HIGH (ref 96–108)
CHLORIDE SERPL-SCNC: 119 MMOL/L — HIGH (ref 96–108)
CHLORIDE SERPL-SCNC: 120 MMOL/L — HIGH (ref 96–108)
CHLORIDE SERPL-SCNC: 121 MMOL/L — HIGH (ref 96–108)
CHLORIDE SERPL-SCNC: 121 MMOL/L — HIGH (ref 96–108)
CHLORIDE SERPL-SCNC: 122 MMOL/L — HIGH (ref 96–108)
CHLORIDE SERPL-SCNC: 123 MMOL/L — HIGH (ref 96–108)
CHLORIDE SERPL-SCNC: 124 MMOL/L — HIGH (ref 96–108)
CHLORIDE SERPL-SCNC: 124 MMOL/L — HIGH (ref 96–108)
CHLORIDE SERPL-SCNC: 125 MMOL/L — HIGH (ref 96–108)
CHLORIDE SERPL-SCNC: 127 MMOL/L — HIGH (ref 96–108)
CHLORIDE SERPL-SCNC: SIGNIFICANT CHANGE UP MMOL/L (ref 96–108)
CHLORIDE UR-SCNC: 132 MMOL/L — SIGNIFICANT CHANGE UP
CHOLEST SERPL-MCNC: 56 MG/DL — SIGNIFICANT CHANGE UP (ref 10–199)
CHOLEST SERPL-MCNC: 61 MG/DL — SIGNIFICANT CHANGE UP (ref 10–199)
CHOLEST SERPL-MCNC: 67 MG/DL — SIGNIFICANT CHANGE UP (ref 10–199)
CK MB BLD-MCNC: 0 % — SIGNIFICANT CHANGE UP (ref 0–0)
CK MB BLD-MCNC: 0 % — SIGNIFICANT CHANGE UP (ref 0–3)
CK MM CFR SERPL: 90 % — LOW (ref 97–100)
CK SERPL-CCNC: 102 U/L — SIGNIFICANT CHANGE UP (ref 21–215)
CK SERPL-CCNC: 154 U/L — SIGNIFICANT CHANGE UP (ref 21–215)
CK SERPL-CCNC: 178 U/L — SIGNIFICANT CHANGE UP (ref 21–215)
CK SERPL-CCNC: 71 U/L — SIGNIFICANT CHANGE UP (ref 21–215)
CK SERPL-CCNC: 94 U/L — SIGNIFICANT CHANGE UP (ref 21–215)
CMV DNA CSF QL NAA+PROBE: SIGNIFICANT CHANGE UP
CO2 SERPL-SCNC: 11 MMOL/L — LOW (ref 22–31)
CO2 SERPL-SCNC: 12 MMOL/L — LOW (ref 22–31)
CO2 SERPL-SCNC: 13 MMOL/L — LOW (ref 22–31)
CO2 SERPL-SCNC: 14 MMOL/L — LOW (ref 22–31)
CO2 SERPL-SCNC: 16 MMOL/L — LOW (ref 22–31)
CO2 SERPL-SCNC: 16 MMOL/L — LOW (ref 22–31)
CO2 SERPL-SCNC: 17 MMOL/L — LOW (ref 22–31)
CO2 SERPL-SCNC: 17 MMOL/L — LOW (ref 22–31)
CO2 SERPL-SCNC: 18 MMOL/L — LOW (ref 22–31)
CO2 SERPL-SCNC: 21 MMOL/L — LOW (ref 22–31)
CO2 SERPL-SCNC: 22 MMOL/L — SIGNIFICANT CHANGE UP (ref 22–31)
CO2 SERPL-SCNC: 23 MMOL/L — SIGNIFICANT CHANGE UP (ref 22–31)
CO2 SERPL-SCNC: 24 MMOL/L — SIGNIFICANT CHANGE UP (ref 22–31)
CO2 SERPL-SCNC: 25 MMOL/L — SIGNIFICANT CHANGE UP (ref 22–31)
CO2 SERPL-SCNC: 26 MMOL/L — SIGNIFICANT CHANGE UP (ref 22–31)
CO2 SERPL-SCNC: 27 MMOL/L — SIGNIFICANT CHANGE UP (ref 22–31)
CO2 SERPL-SCNC: 28 MMOL/L — SIGNIFICANT CHANGE UP (ref 22–31)
CO2 SERPL-SCNC: 29 MMOL/L — SIGNIFICANT CHANGE UP (ref 22–31)
CO2 SERPL-SCNC: 30 MMOL/L — SIGNIFICANT CHANGE UP (ref 22–31)
CO2 SERPL-SCNC: 31 MMOL/L — SIGNIFICANT CHANGE UP (ref 22–31)
CO2 SERPL-SCNC: 32 MMOL/L — HIGH (ref 22–31)
CO2 SERPL-SCNC: 33 MMOL/L — HIGH (ref 22–31)
CO2 SERPL-SCNC: 34 MMOL/L — HIGH (ref 22–31)
CO2 SERPL-SCNC: 35 MMOL/L — HIGH (ref 22–31)
CO2 SERPL-SCNC: 36 MMOL/L — HIGH (ref 22–31)
CO2 SERPL-SCNC: SIGNIFICANT CHANGE UP MMOL/L (ref 22–31)
COD CRY URNS QL: ABNORMAL /HPF
COLOR SPEC: ABNORMAL
COLOR SPEC: YELLOW — SIGNIFICANT CHANGE UP
COMMENT - URINE: SIGNIFICANT CHANGE UP
CORTIS AM PEAK SERPL-MCNC: 7.1 UG/DL — SIGNIFICANT CHANGE UP (ref 6–18.4)
CPK MACRO TYPE 1: 10 % — HIGH
CPK MACRO TYPE 2: 0 % — SIGNIFICANT CHANGE UP
CREAT ?TM UR-MCNC: 26 MG/DL — SIGNIFICANT CHANGE UP
CREAT SERPL-MCNC: 0.47 MG/DL — LOW (ref 0.5–1.3)
CREAT SERPL-MCNC: 0.49 MG/DL — LOW (ref 0.5–1.3)
CREAT SERPL-MCNC: 0.51 MG/DL — SIGNIFICANT CHANGE UP (ref 0.5–1.3)
CREAT SERPL-MCNC: 0.54 MG/DL — SIGNIFICANT CHANGE UP (ref 0.5–1.3)
CREAT SERPL-MCNC: 0.56 MG/DL — SIGNIFICANT CHANGE UP (ref 0.5–1.3)
CREAT SERPL-MCNC: 0.57 MG/DL — SIGNIFICANT CHANGE UP (ref 0.5–1.3)
CREAT SERPL-MCNC: 0.57 MG/DL — SIGNIFICANT CHANGE UP (ref 0.5–1.3)
CREAT SERPL-MCNC: 0.58 MG/DL — SIGNIFICANT CHANGE UP (ref 0.5–1.3)
CREAT SERPL-MCNC: 0.58 MG/DL — SIGNIFICANT CHANGE UP (ref 0.5–1.3)
CREAT SERPL-MCNC: 0.6 MG/DL — SIGNIFICANT CHANGE UP (ref 0.5–1.3)
CREAT SERPL-MCNC: 0.6 MG/DL — SIGNIFICANT CHANGE UP (ref 0.5–1.3)
CREAT SERPL-MCNC: 0.61 MG/DL — SIGNIFICANT CHANGE UP (ref 0.5–1.3)
CREAT SERPL-MCNC: 0.61 MG/DL — SIGNIFICANT CHANGE UP (ref 0.5–1.3)
CREAT SERPL-MCNC: 0.62 MG/DL — SIGNIFICANT CHANGE UP (ref 0.5–1.3)
CREAT SERPL-MCNC: 0.63 MG/DL — SIGNIFICANT CHANGE UP (ref 0.5–1.3)
CREAT SERPL-MCNC: 0.63 MG/DL — SIGNIFICANT CHANGE UP (ref 0.5–1.3)
CREAT SERPL-MCNC: 0.64 MG/DL — SIGNIFICANT CHANGE UP (ref 0.5–1.3)
CREAT SERPL-MCNC: 0.65 MG/DL — SIGNIFICANT CHANGE UP (ref 0.5–1.3)
CREAT SERPL-MCNC: 0.66 MG/DL — SIGNIFICANT CHANGE UP (ref 0.5–1.3)
CREAT SERPL-MCNC: 0.67 MG/DL — SIGNIFICANT CHANGE UP (ref 0.5–1.3)
CREAT SERPL-MCNC: 0.67 MG/DL — SIGNIFICANT CHANGE UP (ref 0.5–1.3)
CREAT SERPL-MCNC: 0.68 MG/DL — SIGNIFICANT CHANGE UP (ref 0.5–1.3)
CREAT SERPL-MCNC: 0.68 MG/DL — SIGNIFICANT CHANGE UP (ref 0.5–1.3)
CREAT SERPL-MCNC: 0.7 MG/DL — SIGNIFICANT CHANGE UP (ref 0.5–1.3)
CREAT SERPL-MCNC: 0.7 MG/DL — SIGNIFICANT CHANGE UP (ref 0.5–1.3)
CREAT SERPL-MCNC: 0.71 MG/DL — SIGNIFICANT CHANGE UP (ref 0.5–1.3)
CREAT SERPL-MCNC: 0.72 MG/DL — SIGNIFICANT CHANGE UP (ref 0.5–1.3)
CREAT SERPL-MCNC: 0.74 MG/DL — SIGNIFICANT CHANGE UP (ref 0.5–1.3)
CREAT SERPL-MCNC: 0.76 MG/DL — SIGNIFICANT CHANGE UP (ref 0.5–1.3)
CREAT SERPL-MCNC: 0.77 MG/DL — SIGNIFICANT CHANGE UP (ref 0.5–1.3)
CREAT SERPL-MCNC: 0.77 MG/DL — SIGNIFICANT CHANGE UP (ref 0.5–1.3)
CREAT SERPL-MCNC: 0.78 MG/DL — SIGNIFICANT CHANGE UP (ref 0.5–1.3)
CREAT SERPL-MCNC: 0.78 MG/DL — SIGNIFICANT CHANGE UP (ref 0.5–1.3)
CREAT SERPL-MCNC: 0.8 MG/DL — SIGNIFICANT CHANGE UP (ref 0.5–1.3)
CREAT SERPL-MCNC: 0.82 MG/DL — SIGNIFICANT CHANGE UP (ref 0.5–1.3)
CREAT SERPL-MCNC: 0.85 MG/DL — SIGNIFICANT CHANGE UP (ref 0.5–1.3)
CREAT SERPL-MCNC: 0.86 MG/DL — SIGNIFICANT CHANGE UP (ref 0.5–1.3)
CREAT SERPL-MCNC: 0.86 MG/DL — SIGNIFICANT CHANGE UP (ref 0.5–1.3)
CREAT SERPL-MCNC: 0.87 MG/DL — SIGNIFICANT CHANGE UP (ref 0.5–1.3)
CREAT SERPL-MCNC: 0.89 MG/DL — SIGNIFICANT CHANGE UP (ref 0.5–1.3)
CREAT SERPL-MCNC: 0.9 MG/DL — SIGNIFICANT CHANGE UP (ref 0.5–1.3)
CREAT SERPL-MCNC: 0.94 MG/DL — SIGNIFICANT CHANGE UP (ref 0.5–1.3)
CREAT SERPL-MCNC: 0.94 MG/DL — SIGNIFICANT CHANGE UP (ref 0.5–1.3)
CREAT SERPL-MCNC: 0.95 MG/DL — SIGNIFICANT CHANGE UP (ref 0.5–1.3)
CREAT SERPL-MCNC: 0.96 MG/DL — SIGNIFICANT CHANGE UP (ref 0.5–1.3)
CREAT SERPL-MCNC: 0.98 MG/DL — SIGNIFICANT CHANGE UP (ref 0.5–1.3)
CREAT SERPL-MCNC: 0.98 MG/DL — SIGNIFICANT CHANGE UP (ref 0.5–1.3)
CREAT SERPL-MCNC: 1.01 MG/DL — SIGNIFICANT CHANGE UP (ref 0.5–1.3)
CREAT SERPL-MCNC: 1.01 MG/DL — SIGNIFICANT CHANGE UP (ref 0.5–1.3)
CREAT SERPL-MCNC: 1.02 MG/DL — SIGNIFICANT CHANGE UP (ref 0.5–1.3)
CREAT SERPL-MCNC: 1.04 MG/DL — SIGNIFICANT CHANGE UP (ref 0.5–1.3)
CREAT SERPL-MCNC: 1.05 MG/DL — SIGNIFICANT CHANGE UP (ref 0.5–1.3)
CREAT SERPL-MCNC: 1.07 MG/DL — SIGNIFICANT CHANGE UP (ref 0.5–1.3)
CREAT SERPL-MCNC: 1.11 MG/DL — SIGNIFICANT CHANGE UP (ref 0.5–1.3)
CREAT SERPL-MCNC: 1.13 MG/DL — SIGNIFICANT CHANGE UP (ref 0.5–1.3)
CREAT SERPL-MCNC: 1.19 MG/DL — SIGNIFICANT CHANGE UP (ref 0.5–1.3)
CREAT SERPL-MCNC: 1.21 MG/DL — SIGNIFICANT CHANGE UP (ref 0.5–1.3)
CREAT SERPL-MCNC: 1.21 MG/DL — SIGNIFICANT CHANGE UP (ref 0.5–1.3)
CREAT SERPL-MCNC: 1.23 MG/DL — SIGNIFICANT CHANGE UP (ref 0.5–1.3)
CREAT SERPL-MCNC: 1.25 MG/DL — SIGNIFICANT CHANGE UP (ref 0.5–1.3)
CREAT SERPL-MCNC: 1.29 MG/DL — SIGNIFICANT CHANGE UP (ref 0.5–1.3)
CREAT SERPL-MCNC: 1.32 MG/DL — HIGH (ref 0.5–1.3)
CREAT SERPL-MCNC: 1.36 MG/DL — HIGH (ref 0.5–1.3)
CREAT SERPL-MCNC: 1.44 MG/DL — HIGH (ref 0.5–1.3)
CREAT SERPL-MCNC: 1.47 MG/DL — HIGH (ref 0.5–1.3)
CREAT SERPL-MCNC: 1.5 MG/DL — HIGH (ref 0.5–1.3)
CREAT SERPL-MCNC: 1.53 MG/DL — HIGH (ref 0.5–1.3)
CREAT SERPL-MCNC: 1.55 MG/DL — HIGH (ref 0.5–1.3)
CREAT SERPL-MCNC: 1.56 MG/DL — HIGH (ref 0.5–1.3)
CREAT SERPL-MCNC: 1.65 MG/DL — HIGH (ref 0.5–1.3)
CREAT SERPL-MCNC: 1.69 MG/DL — HIGH (ref 0.5–1.3)
CREAT SERPL-MCNC: 1.73 MG/DL — HIGH (ref 0.5–1.3)
CREAT SERPL-MCNC: 1.76 MG/DL — HIGH (ref 0.5–1.3)
CREAT SERPL-MCNC: 1.79 MG/DL — HIGH (ref 0.5–1.3)
CREAT SERPL-MCNC: 1.85 MG/DL — HIGH (ref 0.5–1.3)
CREAT SERPL-MCNC: 1.85 MG/DL — HIGH (ref 0.5–1.3)
CREAT SERPL-MCNC: 2 MG/DL — HIGH (ref 0.5–1.3)
CREAT SERPL-MCNC: 2 MG/DL — HIGH (ref 0.5–1.3)
CREAT SERPL-MCNC: 2.01 MG/DL — HIGH (ref 0.5–1.3)
CREAT SERPL-MCNC: 2.06 MG/DL — HIGH (ref 0.5–1.3)
CREAT SERPL-MCNC: 2.09 MG/DL — HIGH (ref 0.5–1.3)
CREAT SERPL-MCNC: 2.14 MG/DL — HIGH (ref 0.5–1.3)
CREAT SERPL-MCNC: 2.17 MG/DL — HIGH (ref 0.5–1.3)
CREAT SERPL-MCNC: 2.21 MG/DL — HIGH (ref 0.5–1.3)
CREAT SERPL-MCNC: 2.25 MG/DL — HIGH (ref 0.5–1.3)
CREAT SERPL-MCNC: 2.37 MG/DL — HIGH (ref 0.5–1.3)
CREAT SERPL-MCNC: 2.54 MG/DL — HIGH (ref 0.5–1.3)
CREATINE KINASE,TOTAL,SERUM: 105 U/L — SIGNIFICANT CHANGE UP (ref 32–182)
CRP SERPL-MCNC: 3.33 MG/DL — HIGH (ref 0–0.4)
CULTURE RESULTS: NO GROWTH — SIGNIFICANT CHANGE UP
CULTURE RESULTS: NO GROWTH — SIGNIFICANT CHANGE UP
CULTURE RESULTS: SIGNIFICANT CHANGE UP
D DIMER BLD IA.RAPID-MCNC: 400 NG/ML DDU — HIGH
DIFF PNL FLD: ABNORMAL
DIFF PNL FLD: NEGATIVE — SIGNIFICANT CHANGE UP
E CLOAC COMP DNA BLD POS QL NAA+PROBE: SIGNIFICANT CHANGE UP
E COLI DNA BLD POS QL NAA+NON-PROBE: SIGNIFICANT CHANGE UP
EBV EA AB SER IA-ACNC: <5 U/ML — SIGNIFICANT CHANGE UP
EBV EA AB TITR SER IF: POSITIVE
EBV EA IGG SER-ACNC: NEGATIVE — SIGNIFICANT CHANGE UP
EBV NA IGG SER IA-ACNC: 326 U/ML — HIGH
EBV PATRN SPEC IB-IMP: SIGNIFICANT CHANGE UP
EBV VCA IGG AVIDITY SER QL IA: POSITIVE
EBV VCA IGM SER IA-ACNC: 269 U/ML — HIGH
EBV VCA IGM SER IA-ACNC: <10 U/ML — SIGNIFICANT CHANGE UP
EBV VCA IGM TITR FLD: NEGATIVE — SIGNIFICANT CHANGE UP
ELLIPTOCYTES BLD QL SMEAR: SLIGHT — SIGNIFICANT CHANGE UP
ENTEROCOC DNA BLD POS QL NAA+NON-PROBE: SIGNIFICANT CHANGE UP
ENTEROCOC DNA BLD POS QL NAA+NON-PROBE: SIGNIFICANT CHANGE UP
EOSINOPHIL # BLD AUTO: 0 K/UL — SIGNIFICANT CHANGE UP (ref 0–0.5)
EOSINOPHIL # BLD AUTO: 0.01 K/UL — SIGNIFICANT CHANGE UP (ref 0–0.5)
EOSINOPHIL # BLD AUTO: 0.01 K/UL — SIGNIFICANT CHANGE UP (ref 0–0.5)
EOSINOPHIL # BLD AUTO: 0.02 K/UL — SIGNIFICANT CHANGE UP (ref 0–0.5)
EOSINOPHIL # BLD AUTO: 0.06 K/UL — SIGNIFICANT CHANGE UP (ref 0–0.5)
EOSINOPHIL # BLD AUTO: 0.07 K/UL — SIGNIFICANT CHANGE UP (ref 0–0.5)
EOSINOPHIL # BLD AUTO: 0.09 K/UL — SIGNIFICANT CHANGE UP (ref 0–0.5)
EOSINOPHIL # BLD AUTO: 0.09 K/UL — SIGNIFICANT CHANGE UP (ref 0–0.5)
EOSINOPHIL # BLD AUTO: 0.1 K/UL — SIGNIFICANT CHANGE UP (ref 0–0.5)
EOSINOPHIL # BLD AUTO: 0.11 K/UL — SIGNIFICANT CHANGE UP (ref 0–0.5)
EOSINOPHIL # BLD AUTO: 0.13 K/UL — SIGNIFICANT CHANGE UP (ref 0–0.5)
EOSINOPHIL # BLD AUTO: 0.13 K/UL — SIGNIFICANT CHANGE UP (ref 0–0.5)
EOSINOPHIL # BLD AUTO: 0.14 K/UL — SIGNIFICANT CHANGE UP (ref 0–0.5)
EOSINOPHIL # BLD AUTO: 0.15 K/UL — SIGNIFICANT CHANGE UP (ref 0–0.5)
EOSINOPHIL # BLD AUTO: 0.15 K/UL — SIGNIFICANT CHANGE UP (ref 0–0.5)
EOSINOPHIL # BLD AUTO: 0.16 K/UL — SIGNIFICANT CHANGE UP (ref 0–0.5)
EOSINOPHIL # BLD AUTO: 0.17 K/UL — SIGNIFICANT CHANGE UP (ref 0–0.5)
EOSINOPHIL # BLD AUTO: 0.17 K/UL — SIGNIFICANT CHANGE UP (ref 0–0.5)
EOSINOPHIL # BLD AUTO: 0.18 K/UL — SIGNIFICANT CHANGE UP (ref 0–0.5)
EOSINOPHIL # BLD AUTO: 0.18 K/UL — SIGNIFICANT CHANGE UP (ref 0–0.5)
EOSINOPHIL # BLD AUTO: 0.19 K/UL — SIGNIFICANT CHANGE UP (ref 0–0.5)
EOSINOPHIL # BLD AUTO: 0.2 K/UL — SIGNIFICANT CHANGE UP (ref 0–0.5)
EOSINOPHIL # BLD AUTO: 0.21 K/UL — SIGNIFICANT CHANGE UP (ref 0–0.5)
EOSINOPHIL # BLD AUTO: 0.22 K/UL — SIGNIFICANT CHANGE UP (ref 0–0.5)
EOSINOPHIL # BLD AUTO: 0.22 K/UL — SIGNIFICANT CHANGE UP (ref 0–0.5)
EOSINOPHIL # BLD AUTO: 0.23 K/UL — SIGNIFICANT CHANGE UP (ref 0–0.5)
EOSINOPHIL # BLD AUTO: 0.25 K/UL — SIGNIFICANT CHANGE UP (ref 0–0.5)
EOSINOPHIL # BLD AUTO: 0.25 K/UL — SIGNIFICANT CHANGE UP (ref 0–0.5)
EOSINOPHIL # BLD AUTO: 0.26 K/UL — SIGNIFICANT CHANGE UP (ref 0–0.5)
EOSINOPHIL # BLD AUTO: 0.28 K/UL — SIGNIFICANT CHANGE UP (ref 0–0.5)
EOSINOPHIL # BLD AUTO: 0.28 K/UL — SIGNIFICANT CHANGE UP (ref 0–0.5)
EOSINOPHIL # BLD AUTO: 0.3 K/UL — SIGNIFICANT CHANGE UP (ref 0–0.5)
EOSINOPHIL # BLD AUTO: 0.31 K/UL — SIGNIFICANT CHANGE UP (ref 0–0.5)
EOSINOPHIL # BLD AUTO: 0.37 K/UL — SIGNIFICANT CHANGE UP (ref 0–0.5)
EOSINOPHIL # BLD AUTO: 0.38 K/UL — SIGNIFICANT CHANGE UP (ref 0–0.5)
EOSINOPHIL # BLD AUTO: 0.46 K/UL — SIGNIFICANT CHANGE UP (ref 0–0.5)
EOSINOPHIL NFR BLD AUTO: 0 % — SIGNIFICANT CHANGE UP (ref 0–6)
EOSINOPHIL NFR BLD AUTO: 0.1 % — SIGNIFICANT CHANGE UP (ref 0–6)
EOSINOPHIL NFR BLD AUTO: 0.1 % — SIGNIFICANT CHANGE UP (ref 0–6)
EOSINOPHIL NFR BLD AUTO: 0.4 % — SIGNIFICANT CHANGE UP (ref 0–6)
EOSINOPHIL NFR BLD AUTO: 0.7 % — SIGNIFICANT CHANGE UP (ref 0–6)
EOSINOPHIL NFR BLD AUTO: 0.8 % — SIGNIFICANT CHANGE UP (ref 0–6)
EOSINOPHIL NFR BLD AUTO: 1 % — SIGNIFICANT CHANGE UP (ref 0–6)
EOSINOPHIL NFR BLD AUTO: 1 % — SIGNIFICANT CHANGE UP (ref 0–6)
EOSINOPHIL NFR BLD AUTO: 1.5 % — SIGNIFICANT CHANGE UP (ref 0–6)
EOSINOPHIL NFR BLD AUTO: 1.5 % — SIGNIFICANT CHANGE UP (ref 0–6)
EOSINOPHIL NFR BLD AUTO: 1.6 % — SIGNIFICANT CHANGE UP (ref 0–6)
EOSINOPHIL NFR BLD AUTO: 1.8 % — SIGNIFICANT CHANGE UP (ref 0–6)
EOSINOPHIL NFR BLD AUTO: 1.9 % — SIGNIFICANT CHANGE UP (ref 0–6)
EOSINOPHIL NFR BLD AUTO: 2 % — SIGNIFICANT CHANGE UP (ref 0–6)
EOSINOPHIL NFR BLD AUTO: 2.1 % — SIGNIFICANT CHANGE UP (ref 0–6)
EOSINOPHIL NFR BLD AUTO: 2.2 % — SIGNIFICANT CHANGE UP (ref 0–6)
EOSINOPHIL NFR BLD AUTO: 2.3 % — SIGNIFICANT CHANGE UP (ref 0–6)
EOSINOPHIL NFR BLD AUTO: 2.4 % — SIGNIFICANT CHANGE UP (ref 0–6)
EOSINOPHIL NFR BLD AUTO: 2.5 % — SIGNIFICANT CHANGE UP (ref 0–6)
EOSINOPHIL NFR BLD AUTO: 2.5 % — SIGNIFICANT CHANGE UP (ref 0–6)
EOSINOPHIL NFR BLD AUTO: 2.6 % — SIGNIFICANT CHANGE UP (ref 0–6)
EOSINOPHIL NFR BLD AUTO: 2.6 % — SIGNIFICANT CHANGE UP (ref 0–6)
EOSINOPHIL NFR BLD AUTO: 2.9 % — SIGNIFICANT CHANGE UP (ref 0–6)
EOSINOPHIL NFR BLD AUTO: 3 % — SIGNIFICANT CHANGE UP (ref 0–6)
EOSINOPHIL NFR BLD AUTO: 3.3 % — SIGNIFICANT CHANGE UP (ref 0–6)
EOSINOPHIL NFR BLD AUTO: 3.3 % — SIGNIFICANT CHANGE UP (ref 0–6)
EOSINOPHIL NFR BLD AUTO: 3.4 % — SIGNIFICANT CHANGE UP (ref 0–6)
EOSINOPHIL NFR BLD AUTO: 3.4 % — SIGNIFICANT CHANGE UP (ref 0–6)
EOSINOPHIL NFR BLD AUTO: 3.5 % — SIGNIFICANT CHANGE UP (ref 0–6)
EOSINOPHIL NFR BLD AUTO: 3.7 % — SIGNIFICANT CHANGE UP (ref 0–6)
EOSINOPHIL NFR BLD AUTO: 4 % — SIGNIFICANT CHANGE UP (ref 0–6)
EOSINOPHIL NFR BLD AUTO: 4.8 % — SIGNIFICANT CHANGE UP (ref 0–6)
EOSINOPHIL NFR BLD AUTO: 5 % — SIGNIFICANT CHANGE UP (ref 0–6)
EPI CELLS # UR: ABNORMAL /HPF
EPI CELLS # UR: SIGNIFICANT CHANGE UP /HPF
ERYTHROCYTE [SEDIMENTATION RATE] IN BLOOD: 6 MM/HR — SIGNIFICANT CHANGE UP (ref 0–20)
ESTIMATED AVERAGE GLUCOSE: 68 MG/DL — SIGNIFICANT CHANGE UP (ref 68–114)
ESTIMATED AVERAGE GLUCOSE: <68 MG/DL — SIGNIFICANT CHANGE UP (ref 68–114)
FERRITIN SERPL-MCNC: 175 NG/ML — HIGH (ref 15–150)
FERRITIN SERPL-MCNC: 181 NG/ML — HIGH (ref 15–150)
FERRITIN SERPL-MCNC: 212 NG/ML — HIGH (ref 15–150)
FERRITIN SERPL-MCNC: 810 NG/ML — HIGH (ref 15–150)
FERRITIN SERPL-MCNC: 829 NG/ML — HIGH (ref 15–150)
FIBRINOGEN PPP-MCNC: 316 MG/DL — SIGNIFICANT CHANGE UP (ref 290–520)
FIBRINOGEN PPP-MCNC: 321 MG/DL — SIGNIFICANT CHANGE UP (ref 290–520)
FOLATE SERPL-MCNC: 16.3 NG/ML — SIGNIFICANT CHANGE UP
FOLATE SERPL-MCNC: >20 NG/ML — SIGNIFICANT CHANGE UP
GAMMA GLOBULIN: 1.1 G/DL — SIGNIFICANT CHANGE UP (ref 0.6–1.6)
GIANT PLATELETS BLD QL SMEAR: PRESENT — SIGNIFICANT CHANGE UP
GLUCOSE BLDC GLUCOMTR-MCNC: 139 MG/DL — HIGH (ref 70–99)
GLUCOSE BLDC GLUCOMTR-MCNC: 93 MG/DL — SIGNIFICANT CHANGE UP (ref 70–99)
GLUCOSE SERPL-MCNC: 100 MG/DL — HIGH (ref 70–99)
GLUCOSE SERPL-MCNC: 100 MG/DL — HIGH (ref 70–99)
GLUCOSE SERPL-MCNC: 103 MG/DL — HIGH (ref 70–99)
GLUCOSE SERPL-MCNC: 103 MG/DL — HIGH (ref 70–99)
GLUCOSE SERPL-MCNC: 104 MG/DL — HIGH (ref 70–99)
GLUCOSE SERPL-MCNC: 105 MG/DL — HIGH (ref 70–99)
GLUCOSE SERPL-MCNC: 106 MG/DL — HIGH (ref 70–99)
GLUCOSE SERPL-MCNC: 107 MG/DL — HIGH (ref 70–99)
GLUCOSE SERPL-MCNC: 108 MG/DL — HIGH (ref 70–99)
GLUCOSE SERPL-MCNC: 110 MG/DL — HIGH (ref 70–99)
GLUCOSE SERPL-MCNC: 111 MG/DL — HIGH (ref 70–99)
GLUCOSE SERPL-MCNC: 111 MG/DL — HIGH (ref 70–99)
GLUCOSE SERPL-MCNC: 112 MG/DL — HIGH (ref 70–99)
GLUCOSE SERPL-MCNC: 112 MG/DL — HIGH (ref 70–99)
GLUCOSE SERPL-MCNC: 113 MG/DL — HIGH (ref 70–99)
GLUCOSE SERPL-MCNC: 114 MG/DL — HIGH (ref 70–99)
GLUCOSE SERPL-MCNC: 118 MG/DL — HIGH (ref 70–99)
GLUCOSE SERPL-MCNC: 131 MG/DL — HIGH (ref 70–99)
GLUCOSE SERPL-MCNC: 133 MG/DL — HIGH (ref 70–99)
GLUCOSE SERPL-MCNC: 149 MG/DL — HIGH (ref 70–99)
GLUCOSE SERPL-MCNC: 154 MG/DL — HIGH (ref 70–99)
GLUCOSE SERPL-MCNC: 156 MG/DL — HIGH (ref 70–99)
GLUCOSE SERPL-MCNC: 59 MG/DL — LOW (ref 70–99)
GLUCOSE SERPL-MCNC: 61 MG/DL — LOW (ref 70–99)
GLUCOSE SERPL-MCNC: 62 MG/DL — LOW (ref 70–99)
GLUCOSE SERPL-MCNC: 67 MG/DL — LOW (ref 70–99)
GLUCOSE SERPL-MCNC: 69 MG/DL — LOW (ref 70–99)
GLUCOSE SERPL-MCNC: 70 MG/DL — SIGNIFICANT CHANGE UP (ref 70–99)
GLUCOSE SERPL-MCNC: 70 MG/DL — SIGNIFICANT CHANGE UP (ref 70–99)
GLUCOSE SERPL-MCNC: 71 MG/DL — SIGNIFICANT CHANGE UP (ref 70–99)
GLUCOSE SERPL-MCNC: 71 MG/DL — SIGNIFICANT CHANGE UP (ref 70–99)
GLUCOSE SERPL-MCNC: 72 MG/DL — SIGNIFICANT CHANGE UP (ref 70–99)
GLUCOSE SERPL-MCNC: 73 MG/DL — SIGNIFICANT CHANGE UP (ref 70–99)
GLUCOSE SERPL-MCNC: 74 MG/DL — SIGNIFICANT CHANGE UP (ref 70–99)
GLUCOSE SERPL-MCNC: 74 MG/DL — SIGNIFICANT CHANGE UP (ref 70–99)
GLUCOSE SERPL-MCNC: 75 MG/DL — SIGNIFICANT CHANGE UP (ref 70–99)
GLUCOSE SERPL-MCNC: 76 MG/DL — SIGNIFICANT CHANGE UP (ref 70–99)
GLUCOSE SERPL-MCNC: 77 MG/DL — SIGNIFICANT CHANGE UP (ref 70–99)
GLUCOSE SERPL-MCNC: 77 MG/DL — SIGNIFICANT CHANGE UP (ref 70–99)
GLUCOSE SERPL-MCNC: 79 MG/DL — SIGNIFICANT CHANGE UP (ref 70–99)
GLUCOSE SERPL-MCNC: 80 MG/DL — SIGNIFICANT CHANGE UP (ref 70–99)
GLUCOSE SERPL-MCNC: 81 MG/DL — SIGNIFICANT CHANGE UP (ref 70–99)
GLUCOSE SERPL-MCNC: 81 MG/DL — SIGNIFICANT CHANGE UP (ref 70–99)
GLUCOSE SERPL-MCNC: 82 MG/DL — SIGNIFICANT CHANGE UP (ref 70–99)
GLUCOSE SERPL-MCNC: 82 MG/DL — SIGNIFICANT CHANGE UP (ref 70–99)
GLUCOSE SERPL-MCNC: 83 MG/DL — SIGNIFICANT CHANGE UP (ref 70–99)
GLUCOSE SERPL-MCNC: 83 MG/DL — SIGNIFICANT CHANGE UP (ref 70–99)
GLUCOSE SERPL-MCNC: 84 MG/DL — SIGNIFICANT CHANGE UP (ref 70–99)
GLUCOSE SERPL-MCNC: 85 MG/DL — SIGNIFICANT CHANGE UP (ref 70–99)
GLUCOSE SERPL-MCNC: 86 MG/DL — SIGNIFICANT CHANGE UP (ref 70–99)
GLUCOSE SERPL-MCNC: 87 MG/DL — SIGNIFICANT CHANGE UP (ref 70–99)
GLUCOSE SERPL-MCNC: 87 MG/DL — SIGNIFICANT CHANGE UP (ref 70–99)
GLUCOSE SERPL-MCNC: 88 MG/DL — SIGNIFICANT CHANGE UP (ref 70–99)
GLUCOSE SERPL-MCNC: 89 MG/DL — SIGNIFICANT CHANGE UP (ref 70–99)
GLUCOSE SERPL-MCNC: 90 MG/DL — SIGNIFICANT CHANGE UP (ref 70–99)
GLUCOSE SERPL-MCNC: 91 MG/DL — SIGNIFICANT CHANGE UP (ref 70–99)
GLUCOSE SERPL-MCNC: 92 MG/DL — SIGNIFICANT CHANGE UP (ref 70–99)
GLUCOSE SERPL-MCNC: 92 MG/DL — SIGNIFICANT CHANGE UP (ref 70–99)
GLUCOSE SERPL-MCNC: 93 MG/DL — SIGNIFICANT CHANGE UP (ref 70–99)
GLUCOSE SERPL-MCNC: 94 MG/DL — SIGNIFICANT CHANGE UP (ref 70–99)
GLUCOSE SERPL-MCNC: 96 MG/DL — SIGNIFICANT CHANGE UP (ref 70–99)
GLUCOSE SERPL-MCNC: 96 MG/DL — SIGNIFICANT CHANGE UP (ref 70–99)
GLUCOSE SERPL-MCNC: 97 MG/DL — SIGNIFICANT CHANGE UP (ref 70–99)
GLUCOSE SERPL-MCNC: 98 MG/DL — SIGNIFICANT CHANGE UP (ref 70–99)
GLUCOSE SERPL-MCNC: 99 MG/DL — SIGNIFICANT CHANGE UP (ref 70–99)
GLUCOSE SERPL-MCNC: SIGNIFICANT CHANGE UP MG/DL (ref 70–99)
GLUCOSE UR QL: NEGATIVE — SIGNIFICANT CHANGE UP
GP B STREP DNA BLD POS QL NAA+NON-PROBE: SIGNIFICANT CHANGE UP
GRAM STN FLD: SIGNIFICANT CHANGE UP
GRAN CASTS # UR COMP ASSIST: ABNORMAL /LPF
HAEM INFLU DNA BLD POS QL NAA+NON-PROBE: SIGNIFICANT CHANGE UP
HAPTOGLOB SERPL-MCNC: <20 MG/DL — LOW (ref 34–200)
HAV IGG SER QL IA: REACTIVE
HAV IGM SER-ACNC: SIGNIFICANT CHANGE UP
HAV IGM SER-ACNC: SIGNIFICANT CHANGE UP
HBA1C BLD-MCNC: <4 % — LOW (ref 4–5.6)
HBV CORE AB SER-ACNC: SIGNIFICANT CHANGE UP
HBV CORE IGM SER-ACNC: SIGNIFICANT CHANGE UP
HBV CORE IGM SER-ACNC: SIGNIFICANT CHANGE UP
HBV SURFACE AB SER-ACNC: REACTIVE
HBV SURFACE AG SER-ACNC: SIGNIFICANT CHANGE UP
HBV SURFACE AG SER-ACNC: SIGNIFICANT CHANGE UP
HCO3 BLDA-SCNC: 11 MMOL/L — LOW (ref 23–27)
HCO3 BLDA-SCNC: 15 MMOL/L — LOW (ref 23–27)
HCO3 BLDA-SCNC: 17 MMOL/L — LOW (ref 23–27)
HCO3 BLDA-SCNC: 18 MMOL/L — LOW (ref 23–27)
HCO3 BLDA-SCNC: 18 MMOL/L — LOW (ref 23–27)
HCO3 BLDA-SCNC: 19 MMOL/L — LOW (ref 23–27)
HCO3 BLDA-SCNC: 20 MMOL/L — LOW (ref 23–27)
HCO3 BLDA-SCNC: 21 MMOL/L — LOW (ref 23–27)
HCO3 BLDA-SCNC: 22 MMOL/L — LOW (ref 23–27)
HCO3 BLDA-SCNC: 22 MMOL/L — LOW (ref 23–27)
HCO3 BLDA-SCNC: 23 MMOL/L — SIGNIFICANT CHANGE UP (ref 23–27)
HCO3 BLDA-SCNC: 23 MMOL/L — SIGNIFICANT CHANGE UP (ref 23–27)
HCO3 BLDA-SCNC: 24 MMOL/L — SIGNIFICANT CHANGE UP (ref 23–27)
HCO3 BLDA-SCNC: 26 MMOL/L — SIGNIFICANT CHANGE UP (ref 23–27)
HCO3 BLDV-SCNC: 11 MMOL/L — LOW (ref 21–29)
HCO3 BLDV-SCNC: 21 MMOL/L — SIGNIFICANT CHANGE UP (ref 21–29)
HCO3 BLDV-SCNC: 22 MMOL/L — SIGNIFICANT CHANGE UP (ref 21–29)
HCT VFR BLD CALC: 19.4 % — CRITICAL LOW (ref 34.5–45)
HCT VFR BLD CALC: 19.7 % — CRITICAL LOW (ref 34.5–45)
HCT VFR BLD CALC: 20.5 % — CRITICAL LOW (ref 34.5–45)
HCT VFR BLD CALC: 20.7 % — CRITICAL LOW (ref 34.5–45)
HCT VFR BLD CALC: 20.8 % — CRITICAL LOW (ref 34.5–45)
HCT VFR BLD CALC: 20.8 % — CRITICAL LOW (ref 34.5–45)
HCT VFR BLD CALC: 21 % — CRITICAL LOW (ref 34.5–45)
HCT VFR BLD CALC: 21 % — CRITICAL LOW (ref 34.5–45)
HCT VFR BLD CALC: 21.2 % — LOW (ref 34.5–45)
HCT VFR BLD CALC: 21.5 % — LOW (ref 34.5–45)
HCT VFR BLD CALC: 21.7 % — LOW (ref 34.5–45)
HCT VFR BLD CALC: 21.7 % — LOW (ref 34.5–45)
HCT VFR BLD CALC: 21.9 % — LOW (ref 34.5–45)
HCT VFR BLD CALC: 21.9 % — LOW (ref 34.5–45)
HCT VFR BLD CALC: 22 % — LOW (ref 34.5–45)
HCT VFR BLD CALC: 22.2 % — LOW (ref 34.5–45)
HCT VFR BLD CALC: 22.3 % — LOW (ref 34.5–45)
HCT VFR BLD CALC: 22.3 % — LOW (ref 34.5–45)
HCT VFR BLD CALC: 22.6 % — LOW (ref 34.5–45)
HCT VFR BLD CALC: 23.1 % — LOW (ref 34.5–45)
HCT VFR BLD CALC: 23.3 % — LOW (ref 34.5–45)
HCT VFR BLD CALC: 23.3 % — LOW (ref 34.5–45)
HCT VFR BLD CALC: 23.6 % — LOW (ref 34.5–45)
HCT VFR BLD CALC: 24 % — LOW (ref 34.5–45)
HCT VFR BLD CALC: 24.2 % — LOW (ref 34.5–45)
HCT VFR BLD CALC: 24.3 % — LOW (ref 34.5–45)
HCT VFR BLD CALC: 24.5 % — LOW (ref 34.5–45)
HCT VFR BLD CALC: 24.5 % — LOW (ref 34.5–45)
HCT VFR BLD CALC: 24.6 % — LOW (ref 34.5–45)
HCT VFR BLD CALC: 24.6 % — LOW (ref 34.5–45)
HCT VFR BLD CALC: 24.7 % — LOW (ref 34.5–45)
HCT VFR BLD CALC: 24.8 % — LOW (ref 34.5–45)
HCT VFR BLD CALC: 24.9 % — LOW (ref 34.5–45)
HCT VFR BLD CALC: 25 % — LOW (ref 34.5–45)
HCT VFR BLD CALC: 25 % — LOW (ref 34.5–45)
HCT VFR BLD CALC: 25.2 % — LOW (ref 34.5–45)
HCT VFR BLD CALC: 25.3 % — LOW (ref 34.5–45)
HCT VFR BLD CALC: 25.5 % — LOW (ref 34.5–45)
HCT VFR BLD CALC: 25.6 % — LOW (ref 34.5–45)
HCT VFR BLD CALC: 25.7 % — LOW (ref 34.5–45)
HCT VFR BLD CALC: 25.7 % — LOW (ref 34.5–45)
HCT VFR BLD CALC: 25.8 % — LOW (ref 34.5–45)
HCT VFR BLD CALC: 25.9 % — LOW (ref 34.5–45)
HCT VFR BLD CALC: 26 % — LOW (ref 34.5–45)
HCT VFR BLD CALC: 26.1 % — LOW (ref 34.5–45)
HCT VFR BLD CALC: 26.2 % — LOW (ref 34.5–45)
HCT VFR BLD CALC: 26.2 % — LOW (ref 34.5–45)
HCT VFR BLD CALC: 26.3 % — LOW (ref 34.5–45)
HCT VFR BLD CALC: 26.3 % — LOW (ref 34.5–45)
HCT VFR BLD CALC: 26.4 % — LOW (ref 34.5–45)
HCT VFR BLD CALC: 26.4 % — LOW (ref 34.5–45)
HCT VFR BLD CALC: 26.5 % — LOW (ref 34.5–45)
HCT VFR BLD CALC: 26.6 % — LOW (ref 34.5–45)
HCT VFR BLD CALC: 26.7 % — LOW (ref 34.5–45)
HCT VFR BLD CALC: 26.8 % — LOW (ref 34.5–45)
HCT VFR BLD CALC: 27 % — LOW (ref 34.5–45)
HCT VFR BLD CALC: 27 % — LOW (ref 34.5–45)
HCT VFR BLD CALC: 27.1 % — LOW (ref 34.5–45)
HCT VFR BLD CALC: 27.1 % — LOW (ref 34.5–45)
HCT VFR BLD CALC: 27.2 % — LOW (ref 34.5–45)
HCT VFR BLD CALC: 27.4 % — LOW (ref 34.5–45)
HCT VFR BLD CALC: 27.5 % — LOW (ref 34.5–45)
HCT VFR BLD CALC: 27.6 % — LOW (ref 34.5–45)
HCT VFR BLD CALC: 27.6 % — LOW (ref 34.5–45)
HCT VFR BLD CALC: 27.7 % — LOW (ref 34.5–45)
HCT VFR BLD CALC: 27.8 % — LOW (ref 34.5–45)
HCT VFR BLD CALC: 27.8 % — LOW (ref 34.5–45)
HCT VFR BLD CALC: 27.9 % — LOW (ref 34.5–45)
HCT VFR BLD CALC: 28.2 % — LOW (ref 34.5–45)
HCT VFR BLD CALC: 28.3 % — LOW (ref 34.5–45)
HCT VFR BLD CALC: 28.4 % — LOW (ref 34.5–45)
HCT VFR BLD CALC: 28.5 % — LOW (ref 34.5–45)
HCT VFR BLD CALC: 28.6 % — LOW (ref 34.5–45)
HCT VFR BLD CALC: 28.6 % — LOW (ref 34.5–45)
HCT VFR BLD CALC: 28.7 % — LOW (ref 34.5–45)
HCT VFR BLD CALC: 28.7 % — LOW (ref 34.5–45)
HCT VFR BLD CALC: 28.8 % — LOW (ref 34.5–45)
HCT VFR BLD CALC: 29.1 % — LOW (ref 34.5–45)
HCT VFR BLD CALC: 29.3 % — LOW (ref 34.5–45)
HCT VFR BLD CALC: 29.4 % — LOW (ref 34.5–45)
HCT VFR BLD CALC: 29.5 % — LOW (ref 34.5–45)
HCT VFR BLD CALC: 29.6 % — LOW (ref 34.5–45)
HCT VFR BLD CALC: 29.6 % — LOW (ref 34.5–45)
HCT VFR BLD CALC: 29.9 % — LOW (ref 34.5–45)
HCT VFR BLD CALC: 29.9 % — LOW (ref 34.5–45)
HCT VFR BLD CALC: 30.2 % — LOW (ref 34.5–45)
HCT VFR BLD CALC: 30.4 % — LOW (ref 34.5–45)
HCT VFR BLD CALC: 30.5 % — LOW (ref 34.5–45)
HCT VFR BLD CALC: 31.5 % — LOW (ref 34.5–45)
HCT VFR BLD CALC: 32 % — LOW (ref 34.5–45)
HCT VFR BLD CALC: 33 % — LOW (ref 34.5–45)
HCT VFR BLD CALC: 33.6 % — LOW (ref 34.5–45)
HCT VFR BLD CALC: 35 % — SIGNIFICANT CHANGE UP (ref 34.5–45)
HCT VFR BLD CALC: 35.2 % — SIGNIFICANT CHANGE UP (ref 34.5–45)
HCT VFR BLD CALC: 35.6 % — SIGNIFICANT CHANGE UP (ref 34.5–45)
HCV AB S/CO SERPL IA: 0.12 S/CO — SIGNIFICANT CHANGE UP (ref 0–0.99)
HCV AB S/CO SERPL IA: 0.14 S/CO — SIGNIFICANT CHANGE UP (ref 0–0.99)
HCV AB SERPL-IMP: SIGNIFICANT CHANGE UP
HCV AB SERPL-IMP: SIGNIFICANT CHANGE UP
HDLC SERPL-MCNC: 26 MG/DL — LOW
HDLC SERPL-MCNC: 5 MG/DL — LOW
HDLC SERPL-MCNC: 5 MG/DL — LOW
HEPARIN-PF4 AB RESULT: <0.6 U/ML — SIGNIFICANT CHANGE UP (ref 0–0.9)
HGB BLD-MCNC: 10.1 G/DL — LOW (ref 11.5–15.5)
HGB BLD-MCNC: 10.1 G/DL — LOW (ref 11.5–15.5)
HGB BLD-MCNC: 10.3 G/DL — LOW (ref 11.5–15.5)
HGB BLD-MCNC: 10.3 G/DL — LOW (ref 11.5–15.5)
HGB BLD-MCNC: 10.8 G/DL — LOW (ref 11.5–15.5)
HGB BLD-MCNC: 11.2 G/DL — LOW (ref 11.5–15.5)
HGB BLD-MCNC: 11.5 G/DL — SIGNIFICANT CHANGE UP (ref 11.5–15.5)
HGB BLD-MCNC: 6.4 G/DL — CRITICAL LOW (ref 11.5–15.5)
HGB BLD-MCNC: 6.6 G/DL — CRITICAL LOW (ref 11.5–15.5)
HGB BLD-MCNC: 6.6 G/DL — CRITICAL LOW (ref 11.5–15.5)
HGB BLD-MCNC: 6.7 G/DL — CRITICAL LOW (ref 11.5–15.5)
HGB BLD-MCNC: 6.8 G/DL — CRITICAL LOW (ref 11.5–15.5)
HGB BLD-MCNC: 6.9 G/DL — CRITICAL LOW (ref 11.5–15.5)
HGB BLD-MCNC: 6.9 G/DL — CRITICAL LOW (ref 11.5–15.5)
HGB BLD-MCNC: 7 G/DL — CRITICAL LOW (ref 11.5–15.5)
HGB BLD-MCNC: 7.1 G/DL — LOW (ref 11.5–15.5)
HGB BLD-MCNC: 7.3 G/DL — LOW (ref 11.5–15.5)
HGB BLD-MCNC: 7.4 G/DL — LOW (ref 11.5–15.5)
HGB BLD-MCNC: 7.5 G/DL — LOW (ref 11.5–15.5)
HGB BLD-MCNC: 7.6 G/DL — LOW (ref 11.5–15.5)
HGB BLD-MCNC: 7.6 G/DL — LOW (ref 11.5–15.5)
HGB BLD-MCNC: 7.7 G/DL — LOW (ref 11.5–15.5)
HGB BLD-MCNC: 7.8 G/DL — LOW (ref 11.5–15.5)
HGB BLD-MCNC: 7.9 G/DL — LOW (ref 11.5–15.5)
HGB BLD-MCNC: 8 G/DL — LOW (ref 11.5–15.5)
HGB BLD-MCNC: 8.1 G/DL — LOW (ref 11.5–15.5)
HGB BLD-MCNC: 8.2 G/DL — LOW (ref 11.5–15.5)
HGB BLD-MCNC: 8.3 G/DL — LOW (ref 11.5–15.5)
HGB BLD-MCNC: 8.4 G/DL — LOW (ref 11.5–15.5)
HGB BLD-MCNC: 8.5 G/DL — LOW (ref 11.5–15.5)
HGB BLD-MCNC: 8.6 G/DL — LOW (ref 11.5–15.5)
HGB BLD-MCNC: 8.7 G/DL — LOW (ref 11.5–15.5)
HGB BLD-MCNC: 8.8 G/DL — LOW (ref 11.5–15.5)
HGB BLD-MCNC: 8.9 G/DL — LOW (ref 11.5–15.5)
HGB BLD-MCNC: 9 G/DL — LOW (ref 11.5–15.5)
HGB BLD-MCNC: 9.1 G/DL — LOW (ref 11.5–15.5)
HGB BLD-MCNC: 9.2 G/DL — LOW (ref 11.5–15.5)
HGB BLD-MCNC: 9.3 G/DL — LOW (ref 11.5–15.5)
HGB BLD-MCNC: 9.3 G/DL — LOW (ref 11.5–15.5)
HGB BLD-MCNC: 9.4 G/DL — LOW (ref 11.5–15.5)
HGB BLD-MCNC: 9.4 G/DL — LOW (ref 11.5–15.5)
HGB BLD-MCNC: 9.5 G/DL — LOW (ref 11.5–15.5)
HGB BLD-MCNC: 9.6 G/DL — LOW (ref 11.5–15.5)
HGB BLD-MCNC: 9.6 G/DL — LOW (ref 11.5–15.5)
HGB BLD-MCNC: 9.9 G/DL — LOW (ref 11.5–15.5)
HGB BLD-MCNC: 9.9 G/DL — LOW (ref 11.5–15.5)
HOROWITZ INDEX BLDA+IHG-RTO: 100 — SIGNIFICANT CHANGE UP
HOROWITZ INDEX BLDA+IHG-RTO: 21 — SIGNIFICANT CHANGE UP
HOROWITZ INDEX BLDA+IHG-RTO: 21 — SIGNIFICANT CHANGE UP
HOROWITZ INDEX BLDA+IHG-RTO: 40 — SIGNIFICANT CHANGE UP
HOROWITZ INDEX BLDA+IHG-RTO: 50 — SIGNIFICANT CHANGE UP
HOROWITZ INDEX BLDV+IHG-RTO: 21 — SIGNIFICANT CHANGE UP
HOROWITZ INDEX BLDV+IHG-RTO: 40 — SIGNIFICANT CHANGE UP
HOROWITZ INDEX BLDV+IHG-RTO: 40 — SIGNIFICANT CHANGE UP
HSV1 IGG SER-ACNC: 3.25 INDEX — HIGH
HSV1 IGG SERPL QL IA: POSITIVE
HYALINE CASTS # UR AUTO: SIGNIFICANT CHANGE UP /LPF
HYPOCHROMIA BLD QL: SIGNIFICANT CHANGE UP
HYPOCHROMIA BLD QL: SLIGHT — SIGNIFICANT CHANGE UP
IGA FLD-MCNC: 352 MG/DL — SIGNIFICANT CHANGE UP (ref 84–499)
IGG FLD-MCNC: 2053 MG/DL — HIGH (ref 610–1660)
IGM SERPL-MCNC: 182 MG/DL — SIGNIFICANT CHANGE UP (ref 35–242)
IMM GRANULOCYTES NFR BLD AUTO: 0.3 % — SIGNIFICANT CHANGE UP (ref 0–1.5)
IMM GRANULOCYTES NFR BLD AUTO: 0.4 % — SIGNIFICANT CHANGE UP (ref 0–1.5)
IMM GRANULOCYTES NFR BLD AUTO: 0.5 % — SIGNIFICANT CHANGE UP (ref 0–1.5)
IMM GRANULOCYTES NFR BLD AUTO: 0.6 % — SIGNIFICANT CHANGE UP (ref 0–1.5)
IMM GRANULOCYTES NFR BLD AUTO: 0.7 % — SIGNIFICANT CHANGE UP (ref 0–1.5)
IMM GRANULOCYTES NFR BLD AUTO: 0.7 % — SIGNIFICANT CHANGE UP (ref 0–1.5)
IMM GRANULOCYTES NFR BLD AUTO: 0.8 % — SIGNIFICANT CHANGE UP (ref 0–1.5)
IMM GRANULOCYTES NFR BLD AUTO: 0.8 % — SIGNIFICANT CHANGE UP (ref 0–1.5)
IMM GRANULOCYTES NFR BLD AUTO: 0.9 % — SIGNIFICANT CHANGE UP (ref 0–1.5)
IMM GRANULOCYTES NFR BLD AUTO: 0.9 % — SIGNIFICANT CHANGE UP (ref 0–1.5)
IMM GRANULOCYTES NFR BLD AUTO: 1.1 % — SIGNIFICANT CHANGE UP (ref 0–1.5)
IMM GRANULOCYTES NFR BLD AUTO: 1.1 % — SIGNIFICANT CHANGE UP (ref 0–1.5)
IMM GRANULOCYTES NFR BLD AUTO: 1.3 % — SIGNIFICANT CHANGE UP (ref 0–1.5)
INR BLD: 1.09 RATIO — SIGNIFICANT CHANGE UP (ref 0.88–1.16)
INR BLD: 1.12 RATIO — SIGNIFICANT CHANGE UP (ref 0.88–1.16)
INR BLD: 1.14 RATIO — SIGNIFICANT CHANGE UP (ref 0.88–1.16)
INR BLD: 1.26 RATIO — HIGH (ref 0.88–1.16)
INR BLD: 1.31 RATIO — HIGH (ref 0.88–1.16)
INR BLD: 1.44 RATIO — HIGH (ref 0.88–1.16)
INR BLD: 1.62 RATIO — HIGH (ref 0.88–1.16)
INR BLD: 1.63 RATIO — HIGH (ref 0.88–1.16)
INR BLD: 1.71 RATIO — HIGH (ref 0.88–1.16)
INR BLD: 1.9 RATIO — HIGH (ref 0.88–1.16)
INR BLD: 1.94 RATIO — HIGH (ref 0.88–1.16)
INR BLD: 1.98 RATIO — HIGH (ref 0.88–1.16)
INR BLD: 2 RATIO — HIGH (ref 0.88–1.16)
INR BLD: 2.17 RATIO — HIGH (ref 0.88–1.16)
INR BLD: 2.42 RATIO — HIGH (ref 0.88–1.16)
INR BLD: 2.6 RATIO — HIGH (ref 0.88–1.16)
INR BLD: 2.83 RATIO — HIGH (ref 0.88–1.16)
INR BLD: 3.64 RATIO — HIGH (ref 0.88–1.16)
INR BLD: 3.66 RATIO — HIGH (ref 0.88–1.16)
INR BLD: 4.21 RATIO — HIGH (ref 0.88–1.16)
INR BLD: ABNORMAL RATIO (ref 0.88–1.16)
INTERPRETATION SERPL IFE-IMP: SIGNIFICANT CHANGE UP
IRON SATN MFR SERPL: 17 % — SIGNIFICANT CHANGE UP (ref 15–50)
IRON SATN MFR SERPL: 19 UG/DL — LOW (ref 40–150)
IRON SATN MFR SERPL: 35 UG/DL — LOW (ref 40–150)
IRON SATN MFR SERPL: 56 % — HIGH (ref 15–50)
IRON SATN MFR SERPL: 60 UG/DL — SIGNIFICANT CHANGE UP (ref 40–150)
IRON SATN MFR SERPL: 62 UG/DL — SIGNIFICANT CHANGE UP (ref 40–150)
IRON SATN MFR SERPL: 65 % — HIGH (ref 15–50)
IRON SATN MFR SERPL: 68 UG/DL — SIGNIFICANT CHANGE UP (ref 40–150)
IRON SATN MFR SERPL: 94 % — HIGH (ref 15–50)
IRON SATN MFR SERPL: 99 % — HIGH (ref 15–50)
K OXYTOCA DNA BLD POS QL NAA+NON-PROBE: SIGNIFICANT CHANGE UP
KAPPA LC SER QL IFE: 14.45 MG/DL — HIGH (ref 0.33–1.94)
KAPPA/LAMBDA FREE LIGHT CHAIN RATIO, SERUM: 1.31 RATIO — SIGNIFICANT CHANGE UP (ref 0.26–1.65)
KETONES UR-MCNC: ABNORMAL
KETONES UR-MCNC: ABNORMAL
KETONES UR-MCNC: NEGATIVE — SIGNIFICANT CHANGE UP
L MONOCYTOG DNA BLD POS QL NAA+NON-PROBE: SIGNIFICANT CHANGE UP
LACTATE SERPL-SCNC: 1.3 MMOL/L — SIGNIFICANT CHANGE UP (ref 0.7–2)
LACTATE SERPL-SCNC: 1.4 MMOL/L — SIGNIFICANT CHANGE UP (ref 0.7–2)
LACTATE SERPL-SCNC: 1.6 MMOL/L — SIGNIFICANT CHANGE UP (ref 0.7–2)
LACTATE SERPL-SCNC: 1.6 MMOL/L — SIGNIFICANT CHANGE UP (ref 0.7–2)
LACTATE SERPL-SCNC: 1.7 MMOL/L — SIGNIFICANT CHANGE UP (ref 0.7–2)
LACTATE SERPL-SCNC: 2.5 MMOL/L — HIGH (ref 0.7–2)
LACTATE SERPL-SCNC: 2.5 MMOL/L — HIGH (ref 0.7–2)
LACTATE SERPL-SCNC: 3.3 MMOL/L — HIGH (ref 0.7–2)
LACTATE SERPL-SCNC: 3.7 MMOL/L — HIGH (ref 0.7–2)
LACTATE SERPL-SCNC: 4.2 MMOL/L — CRITICAL HIGH (ref 0.7–2)
LACTATE SERPL-SCNC: 4.8 MMOL/L — CRITICAL HIGH (ref 0.7–2)
LACTATE SERPL-SCNC: 5.2 MMOL/L — CRITICAL HIGH (ref 0.7–2)
LACTATE SERPL-SCNC: 6.7 MMOL/L — CRITICAL HIGH (ref 0.7–2)
LACTATE SERPL-SCNC: 7.1 MMOL/L — CRITICAL HIGH (ref 0.7–2)
LAMBDA LC SER QL IFE: 11.03 MG/DL — HIGH (ref 0.57–2.63)
LDH SERPL L TO P-CCNC: 509 U/L — HIGH (ref 120–225)
LDH SERPL L TO P-CCNC: 608 U/L — HIGH (ref 120–225)
LDH SERPL L TO P-CCNC: 623 U/L — HIGH (ref 120–225)
LEUKOCYTE ESTERASE UR-ACNC: ABNORMAL
LEUKOCYTE ESTERASE UR-ACNC: NEGATIVE — SIGNIFICANT CHANGE UP
LEUKOCYTE ESTERASE UR-ACNC: NEGATIVE — SIGNIFICANT CHANGE UP
LG PLATELETS BLD QL AUTO: SLIGHT — SIGNIFICANT CHANGE UP
LIPID PNL WITH DIRECT LDL SERPL: 18 MG/DL — SIGNIFICANT CHANGE UP
LIPID PNL WITH DIRECT LDL SERPL: 27 MG/DL — SIGNIFICANT CHANGE UP
LIPID PNL WITH DIRECT LDL SERPL: 41 MG/DL — SIGNIFICANT CHANGE UP
LKM AB SER-ACNC: <20.1 UNITS — SIGNIFICANT CHANGE UP (ref 0–20)
LYMPHOCYTES # BLD AUTO: 0.53 K/UL — LOW (ref 1–3.3)
LYMPHOCYTES # BLD AUTO: 0.56 K/UL — LOW (ref 1–3.3)
LYMPHOCYTES # BLD AUTO: 0.61 K/UL — LOW (ref 1–3.3)
LYMPHOCYTES # BLD AUTO: 0.64 K/UL — LOW (ref 1–3.3)
LYMPHOCYTES # BLD AUTO: 0.69 K/UL — LOW (ref 1–3.3)
LYMPHOCYTES # BLD AUTO: 0.7 K/UL — LOW (ref 1–3.3)
LYMPHOCYTES # BLD AUTO: 0.76 K/UL — LOW (ref 1–3.3)
LYMPHOCYTES # BLD AUTO: 0.8 K/UL — LOW (ref 1–3.3)
LYMPHOCYTES # BLD AUTO: 0.83 K/UL — LOW (ref 1–3.3)
LYMPHOCYTES # BLD AUTO: 0.89 K/UL — LOW (ref 1–3.3)
LYMPHOCYTES # BLD AUTO: 0.9 K/UL — LOW (ref 1–3.3)
LYMPHOCYTES # BLD AUTO: 0.94 K/UL — LOW (ref 1–3.3)
LYMPHOCYTES # BLD AUTO: 1 K/UL — SIGNIFICANT CHANGE UP (ref 1–3.3)
LYMPHOCYTES # BLD AUTO: 1.03 K/UL — SIGNIFICANT CHANGE UP (ref 1–3.3)
LYMPHOCYTES # BLD AUTO: 1.06 K/UL — SIGNIFICANT CHANGE UP (ref 1–3.3)
LYMPHOCYTES # BLD AUTO: 1.08 K/UL — SIGNIFICANT CHANGE UP (ref 1–3.3)
LYMPHOCYTES # BLD AUTO: 1.13 K/UL — SIGNIFICANT CHANGE UP (ref 1–3.3)
LYMPHOCYTES # BLD AUTO: 1.17 K/UL — SIGNIFICANT CHANGE UP (ref 1–3.3)
LYMPHOCYTES # BLD AUTO: 1.27 K/UL — SIGNIFICANT CHANGE UP (ref 1–3.3)
LYMPHOCYTES # BLD AUTO: 1.3 K/UL — SIGNIFICANT CHANGE UP (ref 1–3.3)
LYMPHOCYTES # BLD AUTO: 1.33 K/UL — SIGNIFICANT CHANGE UP (ref 1–3.3)
LYMPHOCYTES # BLD AUTO: 1.35 K/UL — SIGNIFICANT CHANGE UP (ref 1–3.3)
LYMPHOCYTES # BLD AUTO: 1.38 K/UL — SIGNIFICANT CHANGE UP (ref 1–3.3)
LYMPHOCYTES # BLD AUTO: 1.41 K/UL — SIGNIFICANT CHANGE UP (ref 1–3.3)
LYMPHOCYTES # BLD AUTO: 1.42 K/UL — SIGNIFICANT CHANGE UP (ref 1–3.3)
LYMPHOCYTES # BLD AUTO: 1.42 K/UL — SIGNIFICANT CHANGE UP (ref 1–3.3)
LYMPHOCYTES # BLD AUTO: 1.43 K/UL — SIGNIFICANT CHANGE UP (ref 1–3.3)
LYMPHOCYTES # BLD AUTO: 1.43 K/UL — SIGNIFICANT CHANGE UP (ref 1–3.3)
LYMPHOCYTES # BLD AUTO: 1.52 K/UL — SIGNIFICANT CHANGE UP (ref 1–3.3)
LYMPHOCYTES # BLD AUTO: 1.52 K/UL — SIGNIFICANT CHANGE UP (ref 1–3.3)
LYMPHOCYTES # BLD AUTO: 1.53 K/UL — SIGNIFICANT CHANGE UP (ref 1–3.3)
LYMPHOCYTES # BLD AUTO: 1.54 K/UL — SIGNIFICANT CHANGE UP (ref 1–3.3)
LYMPHOCYTES # BLD AUTO: 1.54 K/UL — SIGNIFICANT CHANGE UP (ref 1–3.3)
LYMPHOCYTES # BLD AUTO: 1.55 K/UL — SIGNIFICANT CHANGE UP (ref 1–3.3)
LYMPHOCYTES # BLD AUTO: 1.61 K/UL — SIGNIFICANT CHANGE UP (ref 1–3.3)
LYMPHOCYTES # BLD AUTO: 1.65 K/UL — SIGNIFICANT CHANGE UP (ref 1–3.3)
LYMPHOCYTES # BLD AUTO: 1.65 K/UL — SIGNIFICANT CHANGE UP (ref 1–3.3)
LYMPHOCYTES # BLD AUTO: 1.71 K/UL — SIGNIFICANT CHANGE UP (ref 1–3.3)
LYMPHOCYTES # BLD AUTO: 1.81 K/UL — SIGNIFICANT CHANGE UP (ref 1–3.3)
LYMPHOCYTES # BLD AUTO: 10.1 % — LOW (ref 13–44)
LYMPHOCYTES # BLD AUTO: 11.3 % — LOW (ref 13–44)
LYMPHOCYTES # BLD AUTO: 11.7 % — LOW (ref 13–44)
LYMPHOCYTES # BLD AUTO: 13 % — SIGNIFICANT CHANGE UP (ref 13–44)
LYMPHOCYTES # BLD AUTO: 14.8 % — SIGNIFICANT CHANGE UP (ref 13–44)
LYMPHOCYTES # BLD AUTO: 15.4 % — SIGNIFICANT CHANGE UP (ref 13–44)
LYMPHOCYTES # BLD AUTO: 15.8 % — SIGNIFICANT CHANGE UP (ref 13–44)
LYMPHOCYTES # BLD AUTO: 15.9 % — SIGNIFICANT CHANGE UP (ref 13–44)
LYMPHOCYTES # BLD AUTO: 15.9 % — SIGNIFICANT CHANGE UP (ref 13–44)
LYMPHOCYTES # BLD AUTO: 16 % — SIGNIFICANT CHANGE UP (ref 13–44)
LYMPHOCYTES # BLD AUTO: 16.4 % — SIGNIFICANT CHANGE UP (ref 13–44)
LYMPHOCYTES # BLD AUTO: 16.6 % — SIGNIFICANT CHANGE UP (ref 13–44)
LYMPHOCYTES # BLD AUTO: 17 % — SIGNIFICANT CHANGE UP (ref 13–44)
LYMPHOCYTES # BLD AUTO: 17.9 % — SIGNIFICANT CHANGE UP (ref 13–44)
LYMPHOCYTES # BLD AUTO: 17.9 % — SIGNIFICANT CHANGE UP (ref 13–44)
LYMPHOCYTES # BLD AUTO: 18 % — SIGNIFICANT CHANGE UP (ref 13–44)
LYMPHOCYTES # BLD AUTO: 18.5 % — SIGNIFICANT CHANGE UP (ref 13–44)
LYMPHOCYTES # BLD AUTO: 19 % — SIGNIFICANT CHANGE UP (ref 13–44)
LYMPHOCYTES # BLD AUTO: 19 % — SIGNIFICANT CHANGE UP (ref 13–44)
LYMPHOCYTES # BLD AUTO: 19.6 % — SIGNIFICANT CHANGE UP (ref 13–44)
LYMPHOCYTES # BLD AUTO: 2.08 K/UL — SIGNIFICANT CHANGE UP (ref 1–3.3)
LYMPHOCYTES # BLD AUTO: 20.1 % — SIGNIFICANT CHANGE UP (ref 13–44)
LYMPHOCYTES # BLD AUTO: 20.5 % — SIGNIFICANT CHANGE UP (ref 13–44)
LYMPHOCYTES # BLD AUTO: 20.9 % — SIGNIFICANT CHANGE UP (ref 13–44)
LYMPHOCYTES # BLD AUTO: 21 % — SIGNIFICANT CHANGE UP (ref 13–44)
LYMPHOCYTES # BLD AUTO: 21.1 % — SIGNIFICANT CHANGE UP (ref 13–44)
LYMPHOCYTES # BLD AUTO: 21.7 % — SIGNIFICANT CHANGE UP (ref 13–44)
LYMPHOCYTES # BLD AUTO: 21.9 % — SIGNIFICANT CHANGE UP (ref 13–44)
LYMPHOCYTES # BLD AUTO: 22 % — SIGNIFICANT CHANGE UP (ref 13–44)
LYMPHOCYTES # BLD AUTO: 22.9 % — SIGNIFICANT CHANGE UP (ref 13–44)
LYMPHOCYTES # BLD AUTO: 29.2 % — SIGNIFICANT CHANGE UP (ref 13–44)
LYMPHOCYTES # BLD AUTO: 30 % — SIGNIFICANT CHANGE UP (ref 13–44)
LYMPHOCYTES # BLD AUTO: 31.8 % — SIGNIFICANT CHANGE UP (ref 13–44)
LYMPHOCYTES # BLD AUTO: 42 % — SIGNIFICANT CHANGE UP (ref 13–44)
LYMPHOCYTES # BLD AUTO: 6 % — LOW (ref 13–44)
LYMPHOCYTES # BLD AUTO: 6.8 % — LOW (ref 13–44)
LYMPHOCYTES # BLD AUTO: 7.4 % — LOW (ref 13–44)
LYMPHOCYTES # BLD AUTO: 8 % — LOW (ref 13–44)
LYMPHOCYTES # BLD AUTO: 8.1 % — LOW (ref 13–44)
LYMPHOCYTES # BLD AUTO: 8.2 % — LOW (ref 13–44)
LYMPHOCYTES # BLD AUTO: 8.2 % — LOW (ref 13–44)
LYMPHOCYTES # BLD AUTO: 8.4 % — LOW (ref 13–44)
LYMPHOCYTES # BLD AUTO: 9.6 % — LOW (ref 13–44)
MACROCYTES BLD QL: SLIGHT — SIGNIFICANT CHANGE UP
MAGNESIUM SERPL-MCNC: 1.4 MG/DL — LOW (ref 1.6–2.6)
MAGNESIUM SERPL-MCNC: 1.5 MG/DL — LOW (ref 1.6–2.6)
MAGNESIUM SERPL-MCNC: 1.6 MG/DL — SIGNIFICANT CHANGE UP (ref 1.6–2.6)
MAGNESIUM SERPL-MCNC: 1.7 MG/DL — SIGNIFICANT CHANGE UP (ref 1.6–2.6)
MAGNESIUM SERPL-MCNC: 1.8 MG/DL — SIGNIFICANT CHANGE UP (ref 1.6–2.6)
MAGNESIUM SERPL-MCNC: 1.9 MG/DL — SIGNIFICANT CHANGE UP (ref 1.6–2.6)
MAGNESIUM SERPL-MCNC: 2 MG/DL — SIGNIFICANT CHANGE UP (ref 1.6–2.6)
MAGNESIUM SERPL-MCNC: 2.1 MG/DL — SIGNIFICANT CHANGE UP (ref 1.6–2.6)
MAGNESIUM SERPL-MCNC: 2.2 MG/DL — SIGNIFICANT CHANGE UP (ref 1.6–2.6)
MAGNESIUM SERPL-MCNC: 2.3 MG/DL — SIGNIFICANT CHANGE UP (ref 1.6–2.6)
MAGNESIUM SERPL-MCNC: SIGNIFICANT CHANGE UP MG/DL (ref 1.6–2.6)
MANUAL SMEAR VERIFICATION: SIGNIFICANT CHANGE UP
MCHC RBC-ENTMCNC: 27.5 PG — SIGNIFICANT CHANGE UP (ref 27–34)
MCHC RBC-ENTMCNC: 27.7 PG — SIGNIFICANT CHANGE UP (ref 27–34)
MCHC RBC-ENTMCNC: 27.8 PG — SIGNIFICANT CHANGE UP (ref 27–34)
MCHC RBC-ENTMCNC: 27.9 PG — SIGNIFICANT CHANGE UP (ref 27–34)
MCHC RBC-ENTMCNC: 28 PG — SIGNIFICANT CHANGE UP (ref 27–34)
MCHC RBC-ENTMCNC: 28.1 PG — SIGNIFICANT CHANGE UP (ref 27–34)
MCHC RBC-ENTMCNC: 28.2 PG — SIGNIFICANT CHANGE UP (ref 27–34)
MCHC RBC-ENTMCNC: 28.3 PG — SIGNIFICANT CHANGE UP (ref 27–34)
MCHC RBC-ENTMCNC: 28.4 PG — SIGNIFICANT CHANGE UP (ref 27–34)
MCHC RBC-ENTMCNC: 28.5 GM/DL — LOW (ref 32–36)
MCHC RBC-ENTMCNC: 28.5 PG — SIGNIFICANT CHANGE UP (ref 27–34)
MCHC RBC-ENTMCNC: 28.7 GM/DL — LOW (ref 32–36)
MCHC RBC-ENTMCNC: 28.7 PG — SIGNIFICANT CHANGE UP (ref 27–34)
MCHC RBC-ENTMCNC: 28.8 PG — SIGNIFICANT CHANGE UP (ref 27–34)
MCHC RBC-ENTMCNC: 28.9 GM/DL — LOW (ref 32–36)
MCHC RBC-ENTMCNC: 28.9 PG — SIGNIFICANT CHANGE UP (ref 27–34)
MCHC RBC-ENTMCNC: 28.9 PG — SIGNIFICANT CHANGE UP (ref 27–34)
MCHC RBC-ENTMCNC: 29 PG — SIGNIFICANT CHANGE UP (ref 27–34)
MCHC RBC-ENTMCNC: 29 PG — SIGNIFICANT CHANGE UP (ref 27–34)
MCHC RBC-ENTMCNC: 29.1 GM/DL — LOW (ref 32–36)
MCHC RBC-ENTMCNC: 29.1 PG — SIGNIFICANT CHANGE UP (ref 27–34)
MCHC RBC-ENTMCNC: 29.2 GM/DL — LOW (ref 32–36)
MCHC RBC-ENTMCNC: 29.2 GM/DL — LOW (ref 32–36)
MCHC RBC-ENTMCNC: 29.3 GM/DL — LOW (ref 32–36)
MCHC RBC-ENTMCNC: 29.4 GM/DL — LOW (ref 32–36)
MCHC RBC-ENTMCNC: 29.4 PG — SIGNIFICANT CHANGE UP (ref 27–34)
MCHC RBC-ENTMCNC: 29.4 PG — SIGNIFICANT CHANGE UP (ref 27–34)
MCHC RBC-ENTMCNC: 29.5 PG — SIGNIFICANT CHANGE UP (ref 27–34)
MCHC RBC-ENTMCNC: 29.6 GM/DL — LOW (ref 32–36)
MCHC RBC-ENTMCNC: 29.6 PG — SIGNIFICANT CHANGE UP (ref 27–34)
MCHC RBC-ENTMCNC: 29.7 GM/DL — LOW (ref 32–36)
MCHC RBC-ENTMCNC: 29.7 PG — SIGNIFICANT CHANGE UP (ref 27–34)
MCHC RBC-ENTMCNC: 29.7 PG — SIGNIFICANT CHANGE UP (ref 27–34)
MCHC RBC-ENTMCNC: 29.8 GM/DL — LOW (ref 32–36)
MCHC RBC-ENTMCNC: 29.8 PG — SIGNIFICANT CHANGE UP (ref 27–34)
MCHC RBC-ENTMCNC: 29.9 GM/DL — LOW (ref 32–36)
MCHC RBC-ENTMCNC: 29.9 GM/DL — LOW (ref 32–36)
MCHC RBC-ENTMCNC: 30 GM/DL — LOW (ref 32–36)
MCHC RBC-ENTMCNC: 30.1 GM/DL — LOW (ref 32–36)
MCHC RBC-ENTMCNC: 30.2 GM/DL — LOW (ref 32–36)
MCHC RBC-ENTMCNC: 30.3 GM/DL — LOW (ref 32–36)
MCHC RBC-ENTMCNC: 30.3 PG — SIGNIFICANT CHANGE UP (ref 27–34)
MCHC RBC-ENTMCNC: 30.3 PG — SIGNIFICANT CHANGE UP (ref 27–34)
MCHC RBC-ENTMCNC: 30.4 GM/DL — LOW (ref 32–36)
MCHC RBC-ENTMCNC: 30.4 GM/DL — LOW (ref 32–36)
MCHC RBC-ENTMCNC: 30.4 PG — SIGNIFICANT CHANGE UP (ref 27–34)
MCHC RBC-ENTMCNC: 30.4 PG — SIGNIFICANT CHANGE UP (ref 27–34)
MCHC RBC-ENTMCNC: 30.5 GM/DL — LOW (ref 32–36)
MCHC RBC-ENTMCNC: 30.5 PG — SIGNIFICANT CHANGE UP (ref 27–34)
MCHC RBC-ENTMCNC: 30.5 PG — SIGNIFICANT CHANGE UP (ref 27–34)
MCHC RBC-ENTMCNC: 30.6 GM/DL — LOW (ref 32–36)
MCHC RBC-ENTMCNC: 30.6 PG — SIGNIFICANT CHANGE UP (ref 27–34)
MCHC RBC-ENTMCNC: 30.6 PG — SIGNIFICANT CHANGE UP (ref 27–34)
MCHC RBC-ENTMCNC: 30.7 GM/DL — LOW (ref 32–36)
MCHC RBC-ENTMCNC: 30.7 GM/DL — LOW (ref 32–36)
MCHC RBC-ENTMCNC: 30.7 PG — SIGNIFICANT CHANGE UP (ref 27–34)
MCHC RBC-ENTMCNC: 30.8 PG — SIGNIFICANT CHANGE UP (ref 27–34)
MCHC RBC-ENTMCNC: 30.9 GM/DL — LOW (ref 32–36)
MCHC RBC-ENTMCNC: 30.9 PG — SIGNIFICANT CHANGE UP (ref 27–34)
MCHC RBC-ENTMCNC: 31 GM/DL — LOW (ref 32–36)
MCHC RBC-ENTMCNC: 31 GM/DL — LOW (ref 32–36)
MCHC RBC-ENTMCNC: 31 PG — SIGNIFICANT CHANGE UP (ref 27–34)
MCHC RBC-ENTMCNC: 31.1 GM/DL — LOW (ref 32–36)
MCHC RBC-ENTMCNC: 31.1 GM/DL — LOW (ref 32–36)
MCHC RBC-ENTMCNC: 31.1 PG — SIGNIFICANT CHANGE UP (ref 27–34)
MCHC RBC-ENTMCNC: 31.2 GM/DL — LOW (ref 32–36)
MCHC RBC-ENTMCNC: 31.2 GM/DL — LOW (ref 32–36)
MCHC RBC-ENTMCNC: 31.2 PG — SIGNIFICANT CHANGE UP (ref 27–34)
MCHC RBC-ENTMCNC: 31.3 GM/DL — LOW (ref 32–36)
MCHC RBC-ENTMCNC: 31.3 GM/DL — LOW (ref 32–36)
MCHC RBC-ENTMCNC: 31.3 PG — SIGNIFICANT CHANGE UP (ref 27–34)
MCHC RBC-ENTMCNC: 31.4 GM/DL — LOW (ref 32–36)
MCHC RBC-ENTMCNC: 31.4 PG — SIGNIFICANT CHANGE UP (ref 27–34)
MCHC RBC-ENTMCNC: 31.5 GM/DL — LOW (ref 32–36)
MCHC RBC-ENTMCNC: 31.5 PG — SIGNIFICANT CHANGE UP (ref 27–34)
MCHC RBC-ENTMCNC: 31.5 PG — SIGNIFICANT CHANGE UP (ref 27–34)
MCHC RBC-ENTMCNC: 31.6 PG — SIGNIFICANT CHANGE UP (ref 27–34)
MCHC RBC-ENTMCNC: 31.7 GM/DL — LOW (ref 32–36)
MCHC RBC-ENTMCNC: 31.7 GM/DL — LOW (ref 32–36)
MCHC RBC-ENTMCNC: 31.8 PG — SIGNIFICANT CHANGE UP (ref 27–34)
MCHC RBC-ENTMCNC: 31.9 PG — SIGNIFICANT CHANGE UP (ref 27–34)
MCHC RBC-ENTMCNC: 31.9 PG — SIGNIFICANT CHANGE UP (ref 27–34)
MCHC RBC-ENTMCNC: 32 GM/DL — SIGNIFICANT CHANGE UP (ref 32–36)
MCHC RBC-ENTMCNC: 32 PG — SIGNIFICANT CHANGE UP (ref 27–34)
MCHC RBC-ENTMCNC: 32.1 PG — SIGNIFICANT CHANGE UP (ref 27–34)
MCHC RBC-ENTMCNC: 32.2 GM/DL — SIGNIFICANT CHANGE UP (ref 32–36)
MCHC RBC-ENTMCNC: 32.3 GM/DL — SIGNIFICANT CHANGE UP (ref 32–36)
MCHC RBC-ENTMCNC: 32.3 GM/DL — SIGNIFICANT CHANGE UP (ref 32–36)
MCHC RBC-ENTMCNC: 32.4 GM/DL — SIGNIFICANT CHANGE UP (ref 32–36)
MCHC RBC-ENTMCNC: 32.4 GM/DL — SIGNIFICANT CHANGE UP (ref 32–36)
MCHC RBC-ENTMCNC: 32.5 GM/DL — SIGNIFICANT CHANGE UP (ref 32–36)
MCHC RBC-ENTMCNC: 32.6 GM/DL — SIGNIFICANT CHANGE UP (ref 32–36)
MCHC RBC-ENTMCNC: 32.7 GM/DL — SIGNIFICANT CHANGE UP (ref 32–36)
MCHC RBC-ENTMCNC: 32.8 GM/DL — SIGNIFICANT CHANGE UP (ref 32–36)
MCHC RBC-ENTMCNC: 32.8 GM/DL — SIGNIFICANT CHANGE UP (ref 32–36)
MCHC RBC-ENTMCNC: 32.9 GM/DL — SIGNIFICANT CHANGE UP (ref 32–36)
MCHC RBC-ENTMCNC: 33 GM/DL — SIGNIFICANT CHANGE UP (ref 32–36)
MCHC RBC-ENTMCNC: 33.1 GM/DL — SIGNIFICANT CHANGE UP (ref 32–36)
MCHC RBC-ENTMCNC: 33.2 GM/DL — SIGNIFICANT CHANGE UP (ref 32–36)
MCHC RBC-ENTMCNC: 33.4 GM/DL — SIGNIFICANT CHANGE UP (ref 32–36)
MCHC RBC-ENTMCNC: 33.5 GM/DL — SIGNIFICANT CHANGE UP (ref 32–36)
MCHC RBC-ENTMCNC: 33.6 GM/DL — SIGNIFICANT CHANGE UP (ref 32–36)
MCHC RBC-ENTMCNC: 33.7 GM/DL — SIGNIFICANT CHANGE UP (ref 32–36)
MCHC RBC-ENTMCNC: 33.8 GM/DL — SIGNIFICANT CHANGE UP (ref 32–36)
MCHC RBC-ENTMCNC: 33.9 GM/DL — SIGNIFICANT CHANGE UP (ref 32–36)
MCHC RBC-ENTMCNC: 34 GM/DL — SIGNIFICANT CHANGE UP (ref 32–36)
MCHC RBC-ENTMCNC: 34.1 GM/DL — SIGNIFICANT CHANGE UP (ref 32–36)
MCHC RBC-ENTMCNC: 34.2 GM/DL — SIGNIFICANT CHANGE UP (ref 32–36)
MCHC RBC-ENTMCNC: 34.4 GM/DL — SIGNIFICANT CHANGE UP (ref 32–36)
MCHC RBC-ENTMCNC: 34.4 GM/DL — SIGNIFICANT CHANGE UP (ref 32–36)
MCHC RBC-ENTMCNC: 34.7 GM/DL — SIGNIFICANT CHANGE UP (ref 32–36)
MCHC RBC-ENTMCNC: 35.2 GM/DL — SIGNIFICANT CHANGE UP (ref 32–36)
MCHC RBC-ENTMCNC: 35.5 GM/DL — SIGNIFICANT CHANGE UP (ref 32–36)
MCV RBC AUTO: 100 FL — SIGNIFICANT CHANGE UP (ref 80–100)
MCV RBC AUTO: 100.5 FL — HIGH (ref 80–100)
MCV RBC AUTO: 102 FL — HIGH (ref 80–100)
MCV RBC AUTO: 87.1 FL — SIGNIFICANT CHANGE UP (ref 80–100)
MCV RBC AUTO: 87.3 FL — SIGNIFICANT CHANGE UP (ref 80–100)
MCV RBC AUTO: 87.6 FL — SIGNIFICANT CHANGE UP (ref 80–100)
MCV RBC AUTO: 87.7 FL — SIGNIFICANT CHANGE UP (ref 80–100)
MCV RBC AUTO: 88 FL — SIGNIFICANT CHANGE UP (ref 80–100)
MCV RBC AUTO: 88 FL — SIGNIFICANT CHANGE UP (ref 80–100)
MCV RBC AUTO: 88.5 FL — SIGNIFICANT CHANGE UP (ref 80–100)
MCV RBC AUTO: 88.8 FL — SIGNIFICANT CHANGE UP (ref 80–100)
MCV RBC AUTO: 88.9 FL — SIGNIFICANT CHANGE UP (ref 80–100)
MCV RBC AUTO: 89.4 FL — SIGNIFICANT CHANGE UP (ref 80–100)
MCV RBC AUTO: 89.8 FL — SIGNIFICANT CHANGE UP (ref 80–100)
MCV RBC AUTO: 90 FL — SIGNIFICANT CHANGE UP (ref 80–100)
MCV RBC AUTO: 90 FL — SIGNIFICANT CHANGE UP (ref 80–100)
MCV RBC AUTO: 90.2 FL — SIGNIFICANT CHANGE UP (ref 80–100)
MCV RBC AUTO: 90.4 FL — SIGNIFICANT CHANGE UP (ref 80–100)
MCV RBC AUTO: 90.5 FL — SIGNIFICANT CHANGE UP (ref 80–100)
MCV RBC AUTO: 90.9 FL — SIGNIFICANT CHANGE UP (ref 80–100)
MCV RBC AUTO: 91 FL — SIGNIFICANT CHANGE UP (ref 80–100)
MCV RBC AUTO: 91.2 FL — SIGNIFICANT CHANGE UP (ref 80–100)
MCV RBC AUTO: 91.3 FL — SIGNIFICANT CHANGE UP (ref 80–100)
MCV RBC AUTO: 91.3 FL — SIGNIFICANT CHANGE UP (ref 80–100)
MCV RBC AUTO: 91.6 FL — SIGNIFICANT CHANGE UP (ref 80–100)
MCV RBC AUTO: 91.8 FL — SIGNIFICANT CHANGE UP (ref 80–100)
MCV RBC AUTO: 92.2 FL — SIGNIFICANT CHANGE UP (ref 80–100)
MCV RBC AUTO: 92.3 FL — SIGNIFICANT CHANGE UP (ref 80–100)
MCV RBC AUTO: 92.6 FL — SIGNIFICANT CHANGE UP (ref 80–100)
MCV RBC AUTO: 92.7 FL — SIGNIFICANT CHANGE UP (ref 80–100)
MCV RBC AUTO: 92.8 FL — SIGNIFICANT CHANGE UP (ref 80–100)
MCV RBC AUTO: 92.9 FL — SIGNIFICANT CHANGE UP (ref 80–100)
MCV RBC AUTO: 92.9 FL — SIGNIFICANT CHANGE UP (ref 80–100)
MCV RBC AUTO: 93 FL — SIGNIFICANT CHANGE UP (ref 80–100)
MCV RBC AUTO: 93.1 FL — SIGNIFICANT CHANGE UP (ref 80–100)
MCV RBC AUTO: 93.2 FL — SIGNIFICANT CHANGE UP (ref 80–100)
MCV RBC AUTO: 93.3 FL — SIGNIFICANT CHANGE UP (ref 80–100)
MCV RBC AUTO: 93.5 FL — SIGNIFICANT CHANGE UP (ref 80–100)
MCV RBC AUTO: 93.6 FL — SIGNIFICANT CHANGE UP (ref 80–100)
MCV RBC AUTO: 93.7 FL — SIGNIFICANT CHANGE UP (ref 80–100)
MCV RBC AUTO: 93.8 FL — SIGNIFICANT CHANGE UP (ref 80–100)
MCV RBC AUTO: 93.9 FL — SIGNIFICANT CHANGE UP (ref 80–100)
MCV RBC AUTO: 94 FL — SIGNIFICANT CHANGE UP (ref 80–100)
MCV RBC AUTO: 94 FL — SIGNIFICANT CHANGE UP (ref 80–100)
MCV RBC AUTO: 94.1 FL — SIGNIFICANT CHANGE UP (ref 80–100)
MCV RBC AUTO: 94.2 FL — SIGNIFICANT CHANGE UP (ref 80–100)
MCV RBC AUTO: 94.3 FL — SIGNIFICANT CHANGE UP (ref 80–100)
MCV RBC AUTO: 94.6 FL — SIGNIFICANT CHANGE UP (ref 80–100)
MCV RBC AUTO: 94.7 FL — SIGNIFICANT CHANGE UP (ref 80–100)
MCV RBC AUTO: 94.8 FL — SIGNIFICANT CHANGE UP (ref 80–100)
MCV RBC AUTO: 94.8 FL — SIGNIFICANT CHANGE UP (ref 80–100)
MCV RBC AUTO: 95 FL — SIGNIFICANT CHANGE UP (ref 80–100)
MCV RBC AUTO: 95 FL — SIGNIFICANT CHANGE UP (ref 80–100)
MCV RBC AUTO: 95.1 FL — SIGNIFICANT CHANGE UP (ref 80–100)
MCV RBC AUTO: 95.1 FL — SIGNIFICANT CHANGE UP (ref 80–100)
MCV RBC AUTO: 95.3 FL — SIGNIFICANT CHANGE UP (ref 80–100)
MCV RBC AUTO: 95.4 FL — SIGNIFICANT CHANGE UP (ref 80–100)
MCV RBC AUTO: 95.5 FL — SIGNIFICANT CHANGE UP (ref 80–100)
MCV RBC AUTO: 95.6 FL — SIGNIFICANT CHANGE UP (ref 80–100)
MCV RBC AUTO: 95.7 FL — SIGNIFICANT CHANGE UP (ref 80–100)
MCV RBC AUTO: 95.7 FL — SIGNIFICANT CHANGE UP (ref 80–100)
MCV RBC AUTO: 96.1 FL — SIGNIFICANT CHANGE UP (ref 80–100)
MCV RBC AUTO: 96.1 FL — SIGNIFICANT CHANGE UP (ref 80–100)
MCV RBC AUTO: 96.2 FL — SIGNIFICANT CHANGE UP (ref 80–100)
MCV RBC AUTO: 96.6 FL — SIGNIFICANT CHANGE UP (ref 80–100)
MCV RBC AUTO: 96.7 FL — SIGNIFICANT CHANGE UP (ref 80–100)
MCV RBC AUTO: 96.9 FL — SIGNIFICANT CHANGE UP (ref 80–100)
MCV RBC AUTO: 97.1 FL — SIGNIFICANT CHANGE UP (ref 80–100)
MCV RBC AUTO: 97.2 FL — SIGNIFICANT CHANGE UP (ref 80–100)
MCV RBC AUTO: 97.2 FL — SIGNIFICANT CHANGE UP (ref 80–100)
MCV RBC AUTO: 97.3 FL — SIGNIFICANT CHANGE UP (ref 80–100)
MCV RBC AUTO: 97.5 FL — SIGNIFICANT CHANGE UP (ref 80–100)
MCV RBC AUTO: 97.6 FL — SIGNIFICANT CHANGE UP (ref 80–100)
MCV RBC AUTO: 97.7 FL — SIGNIFICANT CHANGE UP (ref 80–100)
MCV RBC AUTO: 97.7 FL — SIGNIFICANT CHANGE UP (ref 80–100)
MCV RBC AUTO: 98.1 FL — SIGNIFICANT CHANGE UP (ref 80–100)
MCV RBC AUTO: 98.1 FL — SIGNIFICANT CHANGE UP (ref 80–100)
MCV RBC AUTO: 98.6 FL — SIGNIFICANT CHANGE UP (ref 80–100)
MCV RBC AUTO: 98.7 FL — SIGNIFICANT CHANGE UP (ref 80–100)
MCV RBC AUTO: 98.8 FL — SIGNIFICANT CHANGE UP (ref 80–100)
MCV RBC AUTO: 99.3 FL — SIGNIFICANT CHANGE UP (ref 80–100)
MCV RBC AUTO: 99.5 FL — SIGNIFICANT CHANGE UP (ref 80–100)
MCV RBC AUTO: 99.6 FL — SIGNIFICANT CHANGE UP (ref 80–100)
METHOD TYPE: SIGNIFICANT CHANGE UP
MICROCYTES BLD QL: SLIGHT — SIGNIFICANT CHANGE UP
MITOCHONDRIA AB SER-ACNC: SIGNIFICANT CHANGE UP
MITOCHONDRIA AB SER-ACNC: SIGNIFICANT CHANGE UP
MONOCYTES # BLD AUTO: 0.07 K/UL — SIGNIFICANT CHANGE UP (ref 0–0.9)
MONOCYTES # BLD AUTO: 0.08 K/UL — SIGNIFICANT CHANGE UP (ref 0–0.9)
MONOCYTES # BLD AUTO: 0.18 K/UL — SIGNIFICANT CHANGE UP (ref 0–0.9)
MONOCYTES # BLD AUTO: 0.23 K/UL — SIGNIFICANT CHANGE UP (ref 0–0.9)
MONOCYTES # BLD AUTO: 0.25 K/UL — SIGNIFICANT CHANGE UP (ref 0–0.9)
MONOCYTES # BLD AUTO: 0.27 K/UL — SIGNIFICANT CHANGE UP (ref 0–0.9)
MONOCYTES # BLD AUTO: 0.34 K/UL — SIGNIFICANT CHANGE UP (ref 0–0.9)
MONOCYTES # BLD AUTO: 0.37 K/UL — SIGNIFICANT CHANGE UP (ref 0–0.9)
MONOCYTES # BLD AUTO: 0.4 K/UL — SIGNIFICANT CHANGE UP (ref 0–0.9)
MONOCYTES # BLD AUTO: 0.42 K/UL — SIGNIFICANT CHANGE UP (ref 0–0.9)
MONOCYTES # BLD AUTO: 0.48 K/UL — SIGNIFICANT CHANGE UP (ref 0–0.9)
MONOCYTES # BLD AUTO: 0.5 K/UL — SIGNIFICANT CHANGE UP (ref 0–0.9)
MONOCYTES # BLD AUTO: 0.51 K/UL — SIGNIFICANT CHANGE UP (ref 0–0.9)
MONOCYTES # BLD AUTO: 0.51 K/UL — SIGNIFICANT CHANGE UP (ref 0–0.9)
MONOCYTES # BLD AUTO: 0.52 K/UL — SIGNIFICANT CHANGE UP (ref 0–0.9)
MONOCYTES # BLD AUTO: 0.53 K/UL — SIGNIFICANT CHANGE UP (ref 0–0.9)
MONOCYTES # BLD AUTO: 0.54 K/UL — SIGNIFICANT CHANGE UP (ref 0–0.9)
MONOCYTES # BLD AUTO: 0.54 K/UL — SIGNIFICANT CHANGE UP (ref 0–0.9)
MONOCYTES # BLD AUTO: 0.55 K/UL — SIGNIFICANT CHANGE UP (ref 0–0.9)
MONOCYTES # BLD AUTO: 0.56 K/UL — SIGNIFICANT CHANGE UP (ref 0–0.9)
MONOCYTES # BLD AUTO: 0.56 K/UL — SIGNIFICANT CHANGE UP (ref 0–0.9)
MONOCYTES # BLD AUTO: 0.6 K/UL — SIGNIFICANT CHANGE UP (ref 0–0.9)
MONOCYTES # BLD AUTO: 0.61 K/UL — SIGNIFICANT CHANGE UP (ref 0–0.9)
MONOCYTES # BLD AUTO: 0.62 K/UL — SIGNIFICANT CHANGE UP (ref 0–0.9)
MONOCYTES # BLD AUTO: 0.66 K/UL — SIGNIFICANT CHANGE UP (ref 0–0.9)
MONOCYTES # BLD AUTO: 0.68 K/UL — SIGNIFICANT CHANGE UP (ref 0–0.9)
MONOCYTES # BLD AUTO: 0.71 K/UL — SIGNIFICANT CHANGE UP (ref 0–0.9)
MONOCYTES # BLD AUTO: 0.73 K/UL — SIGNIFICANT CHANGE UP (ref 0–0.9)
MONOCYTES # BLD AUTO: 0.78 K/UL — SIGNIFICANT CHANGE UP (ref 0–0.9)
MONOCYTES # BLD AUTO: 0.79 K/UL — SIGNIFICANT CHANGE UP (ref 0–0.9)
MONOCYTES # BLD AUTO: 0.83 K/UL — SIGNIFICANT CHANGE UP (ref 0–0.9)
MONOCYTES # BLD AUTO: 0.87 K/UL — SIGNIFICANT CHANGE UP (ref 0–0.9)
MONOCYTES # BLD AUTO: 0.87 K/UL — SIGNIFICANT CHANGE UP (ref 0–0.9)
MONOCYTES # BLD AUTO: 0.88 K/UL — SIGNIFICANT CHANGE UP (ref 0–0.9)
MONOCYTES # BLD AUTO: 0.89 K/UL — SIGNIFICANT CHANGE UP (ref 0–0.9)
MONOCYTES # BLD AUTO: 0.93 K/UL — HIGH (ref 0–0.9)
MONOCYTES # BLD AUTO: 0.96 K/UL — HIGH (ref 0–0.9)
MONOCYTES # BLD AUTO: 0.96 K/UL — HIGH (ref 0–0.9)
MONOCYTES # BLD AUTO: 0.97 K/UL — HIGH (ref 0–0.9)
MONOCYTES # BLD AUTO: 1.25 K/UL — HIGH (ref 0–0.9)
MONOCYTES # BLD AUTO: 1.27 K/UL — HIGH (ref 0–0.9)
MONOCYTES # BLD AUTO: 1.6 K/UL — HIGH (ref 0–0.9)
MONOCYTES NFR BLD AUTO: 1 % — LOW (ref 2–14)
MONOCYTES NFR BLD AUTO: 10.5 % — SIGNIFICANT CHANGE UP (ref 2–14)
MONOCYTES NFR BLD AUTO: 11 % — SIGNIFICANT CHANGE UP (ref 2–14)
MONOCYTES NFR BLD AUTO: 11.4 % — SIGNIFICANT CHANGE UP (ref 2–14)
MONOCYTES NFR BLD AUTO: 11.8 % — SIGNIFICANT CHANGE UP (ref 2–14)
MONOCYTES NFR BLD AUTO: 12.2 % — SIGNIFICANT CHANGE UP (ref 2–14)
MONOCYTES NFR BLD AUTO: 13.1 % — SIGNIFICANT CHANGE UP (ref 2–14)
MONOCYTES NFR BLD AUTO: 18 % — HIGH (ref 2–14)
MONOCYTES NFR BLD AUTO: 2 % — SIGNIFICANT CHANGE UP (ref 2–14)
MONOCYTES NFR BLD AUTO: 4.6 % — SIGNIFICANT CHANGE UP (ref 2–14)
MONOCYTES NFR BLD AUTO: 4.9 % — SIGNIFICANT CHANGE UP (ref 2–14)
MONOCYTES NFR BLD AUTO: 6 % — SIGNIFICANT CHANGE UP (ref 2–14)
MONOCYTES NFR BLD AUTO: 6 % — SIGNIFICANT CHANGE UP (ref 2–14)
MONOCYTES NFR BLD AUTO: 6.7 % — SIGNIFICANT CHANGE UP (ref 2–14)
MONOCYTES NFR BLD AUTO: 6.8 % — SIGNIFICANT CHANGE UP (ref 2–14)
MONOCYTES NFR BLD AUTO: 7 % — SIGNIFICANT CHANGE UP (ref 2–14)
MONOCYTES NFR BLD AUTO: 7.1 % — SIGNIFICANT CHANGE UP (ref 2–14)
MONOCYTES NFR BLD AUTO: 7.1 % — SIGNIFICANT CHANGE UP (ref 2–14)
MONOCYTES NFR BLD AUTO: 7.2 % — SIGNIFICANT CHANGE UP (ref 2–14)
MONOCYTES NFR BLD AUTO: 7.3 % — SIGNIFICANT CHANGE UP (ref 2–14)
MONOCYTES NFR BLD AUTO: 7.4 % — SIGNIFICANT CHANGE UP (ref 2–14)
MONOCYTES NFR BLD AUTO: 7.8 % — SIGNIFICANT CHANGE UP (ref 2–14)
MONOCYTES NFR BLD AUTO: 7.8 % — SIGNIFICANT CHANGE UP (ref 2–14)
MONOCYTES NFR BLD AUTO: 7.9 % — SIGNIFICANT CHANGE UP (ref 2–14)
MONOCYTES NFR BLD AUTO: 7.9 % — SIGNIFICANT CHANGE UP (ref 2–14)
MONOCYTES NFR BLD AUTO: 8 % — SIGNIFICANT CHANGE UP (ref 2–14)
MONOCYTES NFR BLD AUTO: 8.7 % — SIGNIFICANT CHANGE UP (ref 2–14)
MONOCYTES NFR BLD AUTO: 8.7 % — SIGNIFICANT CHANGE UP (ref 2–14)
MONOCYTES NFR BLD AUTO: 8.8 % — SIGNIFICANT CHANGE UP (ref 2–14)
MONOCYTES NFR BLD AUTO: 8.8 % — SIGNIFICANT CHANGE UP (ref 2–14)
MONOCYTES NFR BLD AUTO: 9 % — SIGNIFICANT CHANGE UP (ref 2–14)
MONOCYTES NFR BLD AUTO: 9 % — SIGNIFICANT CHANGE UP (ref 2–14)
MONOCYTES NFR BLD AUTO: 9.4 % — SIGNIFICANT CHANGE UP (ref 2–14)
MONOCYTES NFR BLD AUTO: 9.6 % — SIGNIFICANT CHANGE UP (ref 2–14)
MONOCYTES NFR BLD AUTO: 9.9 % — SIGNIFICANT CHANGE UP (ref 2–14)
MRSA PCR RESULT.: DETECTED
MRSA SPEC QL CULT: SIGNIFICANT CHANGE UP
MSSA DNA SPEC QL NAA+PROBE: SIGNIFICANT CHANGE UP
N MEN ISLT CULT: SIGNIFICANT CHANGE UP
NEUTROPHILS # BLD AUTO: 1.71 K/UL — LOW (ref 1.8–7.4)
NEUTROPHILS # BLD AUTO: 10.09 K/UL — HIGH (ref 1.8–7.4)
NEUTROPHILS # BLD AUTO: 2.31 K/UL — SIGNIFICANT CHANGE UP (ref 1.8–7.4)
NEUTROPHILS # BLD AUTO: 2.5 K/UL — SIGNIFICANT CHANGE UP (ref 1.8–7.4)
NEUTROPHILS # BLD AUTO: 2.82 K/UL — SIGNIFICANT CHANGE UP (ref 1.8–7.4)
NEUTROPHILS # BLD AUTO: 2.96 K/UL — SIGNIFICANT CHANGE UP (ref 1.8–7.4)
NEUTROPHILS # BLD AUTO: 3.1 K/UL — SIGNIFICANT CHANGE UP (ref 1.8–7.4)
NEUTROPHILS # BLD AUTO: 3.5 K/UL — SIGNIFICANT CHANGE UP (ref 1.8–7.4)
NEUTROPHILS # BLD AUTO: 3.77 K/UL — SIGNIFICANT CHANGE UP (ref 1.8–7.4)
NEUTROPHILS # BLD AUTO: 3.9 K/UL — SIGNIFICANT CHANGE UP (ref 1.8–7.4)
NEUTROPHILS # BLD AUTO: 3.98 K/UL — SIGNIFICANT CHANGE UP (ref 1.8–7.4)
NEUTROPHILS # BLD AUTO: 4.09 K/UL — SIGNIFICANT CHANGE UP (ref 1.8–7.4)
NEUTROPHILS # BLD AUTO: 4.21 K/UL — SIGNIFICANT CHANGE UP (ref 1.8–7.4)
NEUTROPHILS # BLD AUTO: 4.79 K/UL — SIGNIFICANT CHANGE UP (ref 1.8–7.4)
NEUTROPHILS # BLD AUTO: 4.87 K/UL — SIGNIFICANT CHANGE UP (ref 1.8–7.4)
NEUTROPHILS # BLD AUTO: 5.1 K/UL — SIGNIFICANT CHANGE UP (ref 1.8–7.4)
NEUTROPHILS # BLD AUTO: 5.16 K/UL — SIGNIFICANT CHANGE UP (ref 1.8–7.4)
NEUTROPHILS # BLD AUTO: 5.2 K/UL — SIGNIFICANT CHANGE UP (ref 1.8–7.4)
NEUTROPHILS # BLD AUTO: 5.25 K/UL — SIGNIFICANT CHANGE UP (ref 1.8–7.4)
NEUTROPHILS # BLD AUTO: 5.36 K/UL — SIGNIFICANT CHANGE UP (ref 1.8–7.4)
NEUTROPHILS # BLD AUTO: 5.38 K/UL — SIGNIFICANT CHANGE UP (ref 1.8–7.4)
NEUTROPHILS # BLD AUTO: 5.4 K/UL — SIGNIFICANT CHANGE UP (ref 1.8–7.4)
NEUTROPHILS # BLD AUTO: 5.45 K/UL — SIGNIFICANT CHANGE UP (ref 1.8–7.4)
NEUTROPHILS # BLD AUTO: 5.86 K/UL — SIGNIFICANT CHANGE UP (ref 1.8–7.4)
NEUTROPHILS # BLD AUTO: 5.91 K/UL — SIGNIFICANT CHANGE UP (ref 1.8–7.4)
NEUTROPHILS # BLD AUTO: 5.93 K/UL — SIGNIFICANT CHANGE UP (ref 1.8–7.4)
NEUTROPHILS # BLD AUTO: 6.02 K/UL — SIGNIFICANT CHANGE UP (ref 1.8–7.4)
NEUTROPHILS # BLD AUTO: 6.36 K/UL — SIGNIFICANT CHANGE UP (ref 1.8–7.4)
NEUTROPHILS # BLD AUTO: 6.41 K/UL — SIGNIFICANT CHANGE UP (ref 1.8–7.4)
NEUTROPHILS # BLD AUTO: 6.47 K/UL — SIGNIFICANT CHANGE UP (ref 1.8–7.4)
NEUTROPHILS # BLD AUTO: 6.57 K/UL — SIGNIFICANT CHANGE UP (ref 1.8–7.4)
NEUTROPHILS # BLD AUTO: 6.82 K/UL — SIGNIFICANT CHANGE UP (ref 1.8–7.4)
NEUTROPHILS # BLD AUTO: 6.9 K/UL — SIGNIFICANT CHANGE UP (ref 1.8–7.4)
NEUTROPHILS # BLD AUTO: 6.93 K/UL — SIGNIFICANT CHANGE UP (ref 1.8–7.4)
NEUTROPHILS # BLD AUTO: 6.98 K/UL — SIGNIFICANT CHANGE UP (ref 1.8–7.4)
NEUTROPHILS # BLD AUTO: 7.45 K/UL — HIGH (ref 1.8–7.4)
NEUTROPHILS # BLD AUTO: 7.59 K/UL — HIGH (ref 1.8–7.4)
NEUTROPHILS # BLD AUTO: 7.6 K/UL — HIGH (ref 1.8–7.4)
NEUTROPHILS # BLD AUTO: 7.72 K/UL — HIGH (ref 1.8–7.4)
NEUTROPHILS # BLD AUTO: 7.89 K/UL — HIGH (ref 1.8–7.4)
NEUTROPHILS # BLD AUTO: 8.47 K/UL — HIGH (ref 1.8–7.4)
NEUTROPHILS # BLD AUTO: 8.61 K/UL — HIGH (ref 1.8–7.4)
NEUTROPHILS NFR BLD AUTO: 36 % — LOW (ref 43–77)
NEUTROPHILS NFR BLD AUTO: 51 % — SIGNIFICANT CHANGE UP (ref 43–77)
NEUTROPHILS NFR BLD AUTO: 58.3 % — SIGNIFICANT CHANGE UP (ref 43–77)
NEUTROPHILS NFR BLD AUTO: 62.4 % — SIGNIFICANT CHANGE UP (ref 43–77)
NEUTROPHILS NFR BLD AUTO: 64.9 % — SIGNIFICANT CHANGE UP (ref 43–77)
NEUTROPHILS NFR BLD AUTO: 65.3 % — SIGNIFICANT CHANGE UP (ref 43–77)
NEUTROPHILS NFR BLD AUTO: 67.1 % — SIGNIFICANT CHANGE UP (ref 43–77)
NEUTROPHILS NFR BLD AUTO: 67.3 % — SIGNIFICANT CHANGE UP (ref 43–77)
NEUTROPHILS NFR BLD AUTO: 67.5 % — SIGNIFICANT CHANGE UP (ref 43–77)
NEUTROPHILS NFR BLD AUTO: 68 % — SIGNIFICANT CHANGE UP (ref 43–77)
NEUTROPHILS NFR BLD AUTO: 68.3 % — SIGNIFICANT CHANGE UP (ref 43–77)
NEUTROPHILS NFR BLD AUTO: 68.9 % — SIGNIFICANT CHANGE UP (ref 43–77)
NEUTROPHILS NFR BLD AUTO: 69.1 % — SIGNIFICANT CHANGE UP (ref 43–77)
NEUTROPHILS NFR BLD AUTO: 69.2 % — SIGNIFICANT CHANGE UP (ref 43–77)
NEUTROPHILS NFR BLD AUTO: 70.6 % — SIGNIFICANT CHANGE UP (ref 43–77)
NEUTROPHILS NFR BLD AUTO: 71 % — SIGNIFICANT CHANGE UP (ref 43–77)
NEUTROPHILS NFR BLD AUTO: 71.1 % — SIGNIFICANT CHANGE UP (ref 43–77)
NEUTROPHILS NFR BLD AUTO: 71.1 % — SIGNIFICANT CHANGE UP (ref 43–77)
NEUTROPHILS NFR BLD AUTO: 71.2 % — SIGNIFICANT CHANGE UP (ref 43–77)
NEUTROPHILS NFR BLD AUTO: 71.4 % — SIGNIFICANT CHANGE UP (ref 43–77)
NEUTROPHILS NFR BLD AUTO: 71.4 % — SIGNIFICANT CHANGE UP (ref 43–77)
NEUTROPHILS NFR BLD AUTO: 71.5 % — SIGNIFICANT CHANGE UP (ref 43–77)
NEUTROPHILS NFR BLD AUTO: 71.7 % — SIGNIFICANT CHANGE UP (ref 43–77)
NEUTROPHILS NFR BLD AUTO: 72.4 % — SIGNIFICANT CHANGE UP (ref 43–77)
NEUTROPHILS NFR BLD AUTO: 72.8 % — SIGNIFICANT CHANGE UP (ref 43–77)
NEUTROPHILS NFR BLD AUTO: 73.4 % — SIGNIFICANT CHANGE UP (ref 43–77)
NEUTROPHILS NFR BLD AUTO: 73.8 % — SIGNIFICANT CHANGE UP (ref 43–77)
NEUTROPHILS NFR BLD AUTO: 75.7 % — SIGNIFICANT CHANGE UP (ref 43–77)
NEUTROPHILS NFR BLD AUTO: 76 % — SIGNIFICANT CHANGE UP (ref 43–77)
NEUTROPHILS NFR BLD AUTO: 76.8 % — SIGNIFICANT CHANGE UP (ref 43–77)
NEUTROPHILS NFR BLD AUTO: 77 % — SIGNIFICANT CHANGE UP (ref 43–77)
NEUTROPHILS NFR BLD AUTO: 77.1 % — HIGH (ref 43–77)
NEUTROPHILS NFR BLD AUTO: 77.4 % — HIGH (ref 43–77)
NEUTROPHILS NFR BLD AUTO: 78.8 % — HIGH (ref 43–77)
NEUTROPHILS NFR BLD AUTO: 79.6 % — HIGH (ref 43–77)
NEUTROPHILS NFR BLD AUTO: 80.2 % — HIGH (ref 43–77)
NEUTROPHILS NFR BLD AUTO: 82 % — HIGH (ref 43–77)
NEUTROPHILS NFR BLD AUTO: 82.5 % — HIGH (ref 43–77)
NEUTROPHILS NFR BLD AUTO: 82.5 % — HIGH (ref 43–77)
NEUTROPHILS NFR BLD AUTO: 84 % — HIGH (ref 43–77)
NEUTS BAND # BLD: 1 % — SIGNIFICANT CHANGE UP (ref 0–8)
NEUTS BAND # BLD: 2 % — SIGNIFICANT CHANGE UP (ref 0–8)
NITRITE UR-MCNC: NEGATIVE — SIGNIFICANT CHANGE UP
NRBC # BLD: 0 /100 WBCS — SIGNIFICANT CHANGE UP (ref 0–0)
NRBC # BLD: 0 /100 — SIGNIFICANT CHANGE UP (ref 0–0)
NRBC # BLD: 1 /100 WBCS — HIGH (ref 0–0)
NRBC # BLD: 1 /100 — HIGH (ref 0–0)
NRBC # BLD: 10 /100 — HIGH (ref 0–0)
NRBC # BLD: 11 /100 WBCS — HIGH (ref 0–0)
NRBC # BLD: 13 /100 WBCS — HIGH (ref 0–0)
NRBC # BLD: 15 /100 WBCS — HIGH (ref 0–0)
NRBC # BLD: 2 /100 WBCS — HIGH (ref 0–0)
NRBC # BLD: 2 /100 — HIGH (ref 0–0)
NRBC # BLD: 3 /100 WBCS — HIGH (ref 0–0)
NRBC # BLD: 8 /100 WBCS — HIGH (ref 0–0)
NRBC # BLD: 8 /100 WBCS — HIGH (ref 0–0)
OB PNL STL: NEGATIVE — SIGNIFICANT CHANGE UP
OB PNL STL: POSITIVE
ORGANISM # SPEC MICROSCOPIC CNT: SIGNIFICANT CHANGE UP
OSMOLALITY UR: 327 MOS/KG — SIGNIFICANT CHANGE UP (ref 50–1200)
OVALOCYTES BLD QL SMEAR: SLIGHT — SIGNIFICANT CHANGE UP
P AERUGINOSA DNA BLD POS NAA+NON-PROBE: SIGNIFICANT CHANGE UP
P-ANCA SER-ACNC: NEGATIVE — SIGNIFICANT CHANGE UP
PAPPENHEIMER BOD BLD QL SMEAR: PRESENT — SIGNIFICANT CHANGE UP
PAT CTL 2H: 31.9 SEC — SIGNIFICANT CHANGE UP (ref 27.5–36.5)
PAT CTL 2H: 32.2 SEC — SIGNIFICANT CHANGE UP (ref 27.5–36.5)
PCO2 BLDA: 21 MMHG — LOW (ref 32–46)
PCO2 BLDA: 27 MMHG — LOW (ref 32–46)
PCO2 BLDA: 31 MMHG — LOW (ref 32–46)
PCO2 BLDA: 33 MMHG — SIGNIFICANT CHANGE UP (ref 32–46)
PCO2 BLDA: 34 MMHG — SIGNIFICANT CHANGE UP (ref 32–46)
PCO2 BLDA: 35 MMHG — SIGNIFICANT CHANGE UP (ref 32–46)
PCO2 BLDA: 35 MMHG — SIGNIFICANT CHANGE UP (ref 32–46)
PCO2 BLDA: 36 MMHG — SIGNIFICANT CHANGE UP (ref 32–46)
PCO2 BLDA: 36 MMHG — SIGNIFICANT CHANGE UP (ref 32–46)
PCO2 BLDA: 37 MMHG — SIGNIFICANT CHANGE UP (ref 32–46)
PCO2 BLDA: 37 MMHG — SIGNIFICANT CHANGE UP (ref 32–46)
PCO2 BLDA: 38 MMHG — SIGNIFICANT CHANGE UP (ref 32–46)
PCO2 BLDA: 38 MMHG — SIGNIFICANT CHANGE UP (ref 32–46)
PCO2 BLDA: 39 MMHG — SIGNIFICANT CHANGE UP (ref 32–46)
PCO2 BLDA: 41 MMHG — SIGNIFICANT CHANGE UP (ref 32–46)
PCO2 BLDA: 42 MMHG — SIGNIFICANT CHANGE UP (ref 32–46)
PCO2 BLDA: 47 MMHG — HIGH (ref 32–46)
PCO2 BLDA: 83 MMHG — CRITICAL HIGH (ref 32–46)
PCO2 BLDV: 26 MMHG — LOW (ref 35–50)
PCO2 BLDV: 40 MMHG — SIGNIFICANT CHANGE UP (ref 35–50)
PCO2 BLDV: 41 MMHG — SIGNIFICANT CHANGE UP (ref 35–50)
PF4 HEPARIN CMPLX AB SER-ACNC: NEGATIVE — SIGNIFICANT CHANGE UP
PH BLDA: 6.97 — CRITICAL LOW (ref 7.35–7.45)
PH BLDA: 7.18 — CRITICAL LOW (ref 7.35–7.45)
PH BLDA: 7.32 — LOW (ref 7.35–7.45)
PH BLDA: 7.33 — LOW (ref 7.35–7.45)
PH BLDA: 7.35 — SIGNIFICANT CHANGE UP (ref 7.35–7.45)
PH BLDA: 7.36 — SIGNIFICANT CHANGE UP (ref 7.35–7.45)
PH BLDA: 7.37 — SIGNIFICANT CHANGE UP (ref 7.35–7.45)
PH BLDA: 7.38 — SIGNIFICANT CHANGE UP (ref 7.35–7.45)
PH BLDA: 7.38 — SIGNIFICANT CHANGE UP (ref 7.35–7.45)
PH BLDA: 7.4 — SIGNIFICANT CHANGE UP (ref 7.35–7.45)
PH BLDA: 7.41 — SIGNIFICANT CHANGE UP (ref 7.35–7.45)
PH BLDA: 7.42 — SIGNIFICANT CHANGE UP (ref 7.35–7.45)
PH BLDA: 7.45 — SIGNIFICANT CHANGE UP (ref 7.35–7.45)
PH BLDA: 7.46 — HIGH (ref 7.35–7.45)
PH BLDV: 7.25 — LOW (ref 7.35–7.45)
PH BLDV: 7.33 — LOW (ref 7.35–7.45)
PH BLDV: 7.35 — SIGNIFICANT CHANGE UP (ref 7.35–7.45)
PH UR: 5 — SIGNIFICANT CHANGE UP (ref 5–8)
PH UR: 6 — SIGNIFICANT CHANGE UP (ref 5–8)
PH UR: 6 — SIGNIFICANT CHANGE UP (ref 5–8)
PH UR: 6.5 — SIGNIFICANT CHANGE UP (ref 5–8)
PH UR: 7 — SIGNIFICANT CHANGE UP (ref 5–8)
PH UR: 7 — SIGNIFICANT CHANGE UP (ref 5–8)
PHOSPHATE SERPL-MCNC: 0.6 MG/DL — CRITICAL LOW (ref 2.5–4.5)
PHOSPHATE SERPL-MCNC: 1.2 MG/DL — LOW (ref 2.5–4.5)
PHOSPHATE SERPL-MCNC: 1.3 MG/DL — LOW (ref 2.5–4.5)
PHOSPHATE SERPL-MCNC: 1.4 MG/DL — LOW (ref 2.5–4.5)
PHOSPHATE SERPL-MCNC: 1.5 MG/DL — LOW (ref 2.5–4.5)
PHOSPHATE SERPL-MCNC: 1.6 MG/DL — LOW (ref 2.5–4.5)
PHOSPHATE SERPL-MCNC: 1.7 MG/DL — LOW (ref 2.5–4.5)
PHOSPHATE SERPL-MCNC: 1.7 MG/DL — LOW (ref 2.5–4.5)
PHOSPHATE SERPL-MCNC: 1.8 MG/DL — LOW (ref 2.5–4.5)
PHOSPHATE SERPL-MCNC: 1.9 MG/DL — LOW (ref 2.5–4.5)
PHOSPHATE SERPL-MCNC: 1.9 MG/DL — LOW (ref 2.5–4.5)
PHOSPHATE SERPL-MCNC: 2 MG/DL — LOW (ref 2.5–4.5)
PHOSPHATE SERPL-MCNC: 2 MG/DL — LOW (ref 2.5–4.5)
PHOSPHATE SERPL-MCNC: 2.2 MG/DL — LOW (ref 2.5–4.5)
PHOSPHATE SERPL-MCNC: 2.2 MG/DL — LOW (ref 2.5–4.5)
PHOSPHATE SERPL-MCNC: 2.3 MG/DL — LOW (ref 2.5–4.5)
PHOSPHATE SERPL-MCNC: 2.4 MG/DL — LOW (ref 2.5–4.5)
PHOSPHATE SERPL-MCNC: 2.4 MG/DL — LOW (ref 2.5–4.5)
PHOSPHATE SERPL-MCNC: 2.5 MG/DL — SIGNIFICANT CHANGE UP (ref 2.5–4.5)
PHOSPHATE SERPL-MCNC: 2.6 MG/DL — SIGNIFICANT CHANGE UP (ref 2.5–4.5)
PHOSPHATE SERPL-MCNC: 2.7 MG/DL — SIGNIFICANT CHANGE UP (ref 2.5–4.5)
PHOSPHATE SERPL-MCNC: 2.8 MG/DL — SIGNIFICANT CHANGE UP (ref 2.5–4.5)
PHOSPHATE SERPL-MCNC: 2.9 MG/DL — SIGNIFICANT CHANGE UP (ref 2.5–4.5)
PHOSPHATE SERPL-MCNC: 3 MG/DL — SIGNIFICANT CHANGE UP (ref 2.5–4.5)
PHOSPHATE SERPL-MCNC: 3.1 MG/DL — SIGNIFICANT CHANGE UP (ref 2.5–4.5)
PHOSPHATE SERPL-MCNC: 3.2 MG/DL — SIGNIFICANT CHANGE UP (ref 2.5–4.5)
PHOSPHATE SERPL-MCNC: 3.3 MG/DL — SIGNIFICANT CHANGE UP (ref 2.5–4.5)
PHOSPHATE SERPL-MCNC: 3.4 MG/DL — SIGNIFICANT CHANGE UP (ref 2.5–4.5)
PHOSPHATE SERPL-MCNC: 3.5 MG/DL — SIGNIFICANT CHANGE UP (ref 2.5–4.5)
PHOSPHATE SERPL-MCNC: 3.5 MG/DL — SIGNIFICANT CHANGE UP (ref 2.5–4.5)
PHOSPHATE SERPL-MCNC: 3.6 MG/DL — SIGNIFICANT CHANGE UP (ref 2.5–4.5)
PHOSPHATE SERPL-MCNC: 3.7 MG/DL — SIGNIFICANT CHANGE UP (ref 2.5–4.5)
PHOSPHATE SERPL-MCNC: 3.7 MG/DL — SIGNIFICANT CHANGE UP (ref 2.5–4.5)
PHOSPHATE SERPL-MCNC: 3.8 MG/DL — SIGNIFICANT CHANGE UP (ref 2.5–4.5)
PHOSPHATE SERPL-MCNC: 3.8 MG/DL — SIGNIFICANT CHANGE UP (ref 2.5–4.5)
PHOSPHATE SERPL-MCNC: 3.9 MG/DL — SIGNIFICANT CHANGE UP (ref 2.5–4.5)
PHOSPHATE SERPL-MCNC: 4 MG/DL — SIGNIFICANT CHANGE UP (ref 2.5–4.5)
PLAT MORPH BLD: NORMAL — SIGNIFICANT CHANGE UP
PLATELET # BLD AUTO: 101 K/UL — LOW (ref 150–400)
PLATELET # BLD AUTO: 106 K/UL — LOW (ref 150–400)
PLATELET # BLD AUTO: 107 K/UL — LOW (ref 150–400)
PLATELET # BLD AUTO: 108 K/UL — LOW (ref 150–400)
PLATELET # BLD AUTO: 108 K/UL — LOW (ref 150–400)
PLATELET # BLD AUTO: 111 K/UL — LOW (ref 150–400)
PLATELET # BLD AUTO: 116 K/UL — LOW (ref 150–400)
PLATELET # BLD AUTO: 116 K/UL — LOW (ref 150–400)
PLATELET # BLD AUTO: 119 K/UL — LOW (ref 150–400)
PLATELET # BLD AUTO: 120 K/UL — LOW (ref 150–400)
PLATELET # BLD AUTO: 122 K/UL — LOW (ref 150–400)
PLATELET # BLD AUTO: 122 K/UL — LOW (ref 150–400)
PLATELET # BLD AUTO: 126 K/UL — LOW (ref 150–400)
PLATELET # BLD AUTO: 128 K/UL — LOW (ref 150–400)
PLATELET # BLD AUTO: 129 K/UL — LOW (ref 150–400)
PLATELET # BLD AUTO: 130 K/UL — LOW (ref 150–400)
PLATELET # BLD AUTO: 131 K/UL — LOW (ref 150–400)
PLATELET # BLD AUTO: 134 K/UL — LOW (ref 150–400)
PLATELET # BLD AUTO: 137 K/UL — LOW (ref 150–400)
PLATELET # BLD AUTO: 139 K/UL — LOW (ref 150–400)
PLATELET # BLD AUTO: 141 K/UL — LOW (ref 150–400)
PLATELET # BLD AUTO: 143 K/UL — LOW (ref 150–400)
PLATELET # BLD AUTO: 149 K/UL — LOW (ref 150–400)
PLATELET # BLD AUTO: 150 K/UL — SIGNIFICANT CHANGE UP (ref 150–400)
PLATELET # BLD AUTO: 152 K/UL — SIGNIFICANT CHANGE UP (ref 150–400)
PLATELET # BLD AUTO: 163 K/UL — SIGNIFICANT CHANGE UP (ref 150–400)
PLATELET # BLD AUTO: 164 K/UL — SIGNIFICANT CHANGE UP (ref 150–400)
PLATELET # BLD AUTO: 165 K/UL — SIGNIFICANT CHANGE UP (ref 150–400)
PLATELET # BLD AUTO: 167 K/UL — SIGNIFICANT CHANGE UP (ref 150–400)
PLATELET # BLD AUTO: 172 K/UL — SIGNIFICANT CHANGE UP (ref 150–400)
PLATELET # BLD AUTO: 179 K/UL — SIGNIFICANT CHANGE UP (ref 150–400)
PLATELET # BLD AUTO: 190 K/UL — SIGNIFICANT CHANGE UP (ref 150–400)
PLATELET # BLD AUTO: 192 K/UL — SIGNIFICANT CHANGE UP (ref 150–400)
PLATELET # BLD AUTO: 199 K/UL — SIGNIFICANT CHANGE UP (ref 150–400)
PLATELET # BLD AUTO: 208 K/UL — SIGNIFICANT CHANGE UP (ref 150–400)
PLATELET # BLD AUTO: 211 K/UL — SIGNIFICANT CHANGE UP (ref 150–400)
PLATELET # BLD AUTO: 216 K/UL — SIGNIFICANT CHANGE UP (ref 150–400)
PLATELET # BLD AUTO: 219 K/UL — SIGNIFICANT CHANGE UP (ref 150–400)
PLATELET # BLD AUTO: 221 K/UL — SIGNIFICANT CHANGE UP (ref 150–400)
PLATELET # BLD AUTO: 222 K/UL — SIGNIFICANT CHANGE UP (ref 150–400)
PLATELET # BLD AUTO: 226 K/UL — SIGNIFICANT CHANGE UP (ref 150–400)
PLATELET # BLD AUTO: 236 K/UL — SIGNIFICANT CHANGE UP (ref 150–400)
PLATELET # BLD AUTO: 237 K/UL — SIGNIFICANT CHANGE UP (ref 150–400)
PLATELET # BLD AUTO: 239 K/UL — SIGNIFICANT CHANGE UP (ref 150–400)
PLATELET # BLD AUTO: 239 K/UL — SIGNIFICANT CHANGE UP (ref 150–400)
PLATELET # BLD AUTO: 243 K/UL — SIGNIFICANT CHANGE UP (ref 150–400)
PLATELET # BLD AUTO: 244 K/UL — SIGNIFICANT CHANGE UP (ref 150–400)
PLATELET # BLD AUTO: 245 K/UL — SIGNIFICANT CHANGE UP (ref 150–400)
PLATELET # BLD AUTO: 248 K/UL — SIGNIFICANT CHANGE UP (ref 150–400)
PLATELET # BLD AUTO: 250 K/UL — SIGNIFICANT CHANGE UP (ref 150–400)
PLATELET # BLD AUTO: 252 K/UL — SIGNIFICANT CHANGE UP (ref 150–400)
PLATELET # BLD AUTO: 254 K/UL — SIGNIFICANT CHANGE UP (ref 150–400)
PLATELET # BLD AUTO: 269 K/UL — SIGNIFICANT CHANGE UP (ref 150–400)
PLATELET # BLD AUTO: 269 K/UL — SIGNIFICANT CHANGE UP (ref 150–400)
PLATELET # BLD AUTO: 27 K/UL — LOW (ref 150–400)
PLATELET # BLD AUTO: 272 K/UL — SIGNIFICANT CHANGE UP (ref 150–400)
PLATELET # BLD AUTO: 275 K/UL — SIGNIFICANT CHANGE UP (ref 150–400)
PLATELET # BLD AUTO: 279 K/UL — SIGNIFICANT CHANGE UP (ref 150–400)
PLATELET # BLD AUTO: 280 K/UL — SIGNIFICANT CHANGE UP (ref 150–400)
PLATELET # BLD AUTO: 283 K/UL — SIGNIFICANT CHANGE UP (ref 150–400)
PLATELET # BLD AUTO: 283 K/UL — SIGNIFICANT CHANGE UP (ref 150–400)
PLATELET # BLD AUTO: 285 K/UL — SIGNIFICANT CHANGE UP (ref 150–400)
PLATELET # BLD AUTO: 288 K/UL — SIGNIFICANT CHANGE UP (ref 150–400)
PLATELET # BLD AUTO: 289 K/UL — SIGNIFICANT CHANGE UP (ref 150–400)
PLATELET # BLD AUTO: 29 K/UL — LOW (ref 150–400)
PLATELET # BLD AUTO: 290 K/UL — SIGNIFICANT CHANGE UP (ref 150–400)
PLATELET # BLD AUTO: 294 K/UL — SIGNIFICANT CHANGE UP (ref 150–400)
PLATELET # BLD AUTO: 297 K/UL — SIGNIFICANT CHANGE UP (ref 150–400)
PLATELET # BLD AUTO: 297 K/UL — SIGNIFICANT CHANGE UP (ref 150–400)
PLATELET # BLD AUTO: 300 K/UL — SIGNIFICANT CHANGE UP (ref 150–400)
PLATELET # BLD AUTO: 301 K/UL — SIGNIFICANT CHANGE UP (ref 150–400)
PLATELET # BLD AUTO: 301 K/UL — SIGNIFICANT CHANGE UP (ref 150–400)
PLATELET # BLD AUTO: 302 K/UL — SIGNIFICANT CHANGE UP (ref 150–400)
PLATELET # BLD AUTO: 305 K/UL — SIGNIFICANT CHANGE UP (ref 150–400)
PLATELET # BLD AUTO: 310 K/UL — SIGNIFICANT CHANGE UP (ref 150–400)
PLATELET # BLD AUTO: 314 K/UL — SIGNIFICANT CHANGE UP (ref 150–400)
PLATELET # BLD AUTO: 316 K/UL — SIGNIFICANT CHANGE UP (ref 150–400)
PLATELET # BLD AUTO: 319 K/UL — SIGNIFICANT CHANGE UP (ref 150–400)
PLATELET # BLD AUTO: 319 K/UL — SIGNIFICANT CHANGE UP (ref 150–400)
PLATELET # BLD AUTO: 32 K/UL — LOW (ref 150–400)
PLATELET # BLD AUTO: 32 K/UL — LOW (ref 150–400)
PLATELET # BLD AUTO: 330 K/UL — SIGNIFICANT CHANGE UP (ref 150–400)
PLATELET # BLD AUTO: 333 K/UL — SIGNIFICANT CHANGE UP (ref 150–400)
PLATELET # BLD AUTO: 43 K/UL — LOW (ref 150–400)
PLATELET # BLD AUTO: 45 K/UL — LOW (ref 150–400)
PLATELET # BLD AUTO: 45 K/UL — LOW (ref 150–400)
PLATELET # BLD AUTO: 48 K/UL — LOW (ref 150–400)
PLATELET # BLD AUTO: 52 K/UL — LOW (ref 150–400)
PLATELET # BLD AUTO: 56 K/UL — LOW (ref 150–400)
PLATELET # BLD AUTO: 56 K/UL — LOW (ref 150–400)
PLATELET # BLD AUTO: 58 K/UL — LOW (ref 150–400)
PLATELET # BLD AUTO: 60 K/UL — LOW (ref 150–400)
PLATELET # BLD AUTO: 65 K/UL — LOW (ref 150–400)
PLATELET # BLD AUTO: 68 K/UL — LOW (ref 150–400)
PLATELET # BLD AUTO: 69 K/UL — LOW (ref 150–400)
PLATELET # BLD AUTO: 69 K/UL — LOW (ref 150–400)
PLATELET # BLD AUTO: 73 K/UL — LOW (ref 150–400)
PLATELET # BLD AUTO: 75 K/UL — LOW (ref 150–400)
PLATELET # BLD AUTO: 83 K/UL — LOW (ref 150–400)
PLATELET # BLD AUTO: 83 K/UL — LOW (ref 150–400)
PLATELET # BLD AUTO: 87 K/UL — LOW (ref 150–400)
PLATELET # BLD AUTO: 87 K/UL — LOW (ref 150–400)
PLATELET # BLD AUTO: 92 K/UL — LOW (ref 150–400)
PLATELET # BLD AUTO: 93 K/UL — LOW (ref 150–400)
PLATELET # BLD AUTO: 96 K/UL — LOW (ref 150–400)
PLATELET # BLD AUTO: 98 K/UL — LOW (ref 150–400)
PLATELET COUNT - ESTIMATE: ABNORMAL
PLATELET COUNT - ESTIMATE: ABNORMAL
PO2 BLDA: 104 MMHG — SIGNIFICANT CHANGE UP (ref 74–108)
PO2 BLDA: 123 MMHG — HIGH (ref 74–108)
PO2 BLDA: 132 MMHG — HIGH (ref 74–108)
PO2 BLDA: 144 MMHG — HIGH (ref 74–108)
PO2 BLDA: 154 MMHG — HIGH (ref 74–108)
PO2 BLDA: 162 MMHG — HIGH (ref 74–108)
PO2 BLDA: 164 MMHG — HIGH (ref 74–108)
PO2 BLDA: 196 MMHG — HIGH (ref 74–108)
PO2 BLDA: 198 MMHG — HIGH (ref 74–108)
PO2 BLDA: 200 MMHG — HIGH (ref 74–108)
PO2 BLDA: 232 MMHG — HIGH (ref 74–108)
PO2 BLDA: 378 MMHG — HIGH (ref 74–108)
PO2 BLDA: 40 MMHG — CRITICAL LOW (ref 74–108)
PO2 BLDA: 60 MMHG — LOW (ref 74–108)
PO2 BLDA: 66 MMHG — LOW (ref 74–108)
PO2 BLDA: 80 MMHG — SIGNIFICANT CHANGE UP (ref 74–108)
PO2 BLDA: 83 MMHG — SIGNIFICANT CHANGE UP (ref 74–108)
PO2 BLDA: 96 MMHG — SIGNIFICANT CHANGE UP (ref 74–108)
PO2 BLDV: 36 MMHG — SIGNIFICANT CHANGE UP (ref 25–45)
PO2 BLDV: 43 MMHG — SIGNIFICANT CHANGE UP (ref 25–45)
PO2 BLDV: 49 MMHG — HIGH (ref 25–45)
POIKILOCYTOSIS BLD QL AUTO: SLIGHT — SIGNIFICANT CHANGE UP
POLYCHROMASIA BLD QL SMEAR: SLIGHT — SIGNIFICANT CHANGE UP
POTASSIUM SERPL-MCNC: 2.7 MMOL/L — CRITICAL LOW (ref 3.5–5.3)
POTASSIUM SERPL-MCNC: 2.8 MMOL/L — CRITICAL LOW (ref 3.5–5.3)
POTASSIUM SERPL-MCNC: 2.9 MMOL/L — CRITICAL LOW (ref 3.5–5.3)
POTASSIUM SERPL-MCNC: 3 MMOL/L — LOW (ref 3.5–5.3)
POTASSIUM SERPL-MCNC: 3 MMOL/L — LOW (ref 3.5–5.3)
POTASSIUM SERPL-MCNC: 3.1 MMOL/L — LOW (ref 3.5–5.3)
POTASSIUM SERPL-MCNC: 3.2 MMOL/L — LOW (ref 3.5–5.3)
POTASSIUM SERPL-MCNC: 3.3 MMOL/L — LOW (ref 3.5–5.3)
POTASSIUM SERPL-MCNC: 3.4 MMOL/L — LOW (ref 3.5–5.3)
POTASSIUM SERPL-MCNC: 3.5 MMOL/L — SIGNIFICANT CHANGE UP (ref 3.5–5.3)
POTASSIUM SERPL-MCNC: 3.6 MMOL/L — SIGNIFICANT CHANGE UP (ref 3.5–5.3)
POTASSIUM SERPL-MCNC: 3.7 MMOL/L — SIGNIFICANT CHANGE UP (ref 3.5–5.3)
POTASSIUM SERPL-MCNC: 3.8 MMOL/L — SIGNIFICANT CHANGE UP (ref 3.5–5.3)
POTASSIUM SERPL-MCNC: 3.9 MMOL/L — SIGNIFICANT CHANGE UP (ref 3.5–5.3)
POTASSIUM SERPL-MCNC: 4 MMOL/L — SIGNIFICANT CHANGE UP (ref 3.5–5.3)
POTASSIUM SERPL-MCNC: 4.1 MMOL/L — SIGNIFICANT CHANGE UP (ref 3.5–5.3)
POTASSIUM SERPL-MCNC: 4.2 MMOL/L — SIGNIFICANT CHANGE UP (ref 3.5–5.3)
POTASSIUM SERPL-MCNC: 4.4 MMOL/L — SIGNIFICANT CHANGE UP (ref 3.5–5.3)
POTASSIUM SERPL-MCNC: 4.5 MMOL/L — SIGNIFICANT CHANGE UP (ref 3.5–5.3)
POTASSIUM SERPL-MCNC: 4.5 MMOL/L — SIGNIFICANT CHANGE UP (ref 3.5–5.3)
POTASSIUM SERPL-MCNC: 4.8 MMOL/L — SIGNIFICANT CHANGE UP (ref 3.5–5.3)
POTASSIUM SERPL-MCNC: 4.9 MMOL/L — SIGNIFICANT CHANGE UP (ref 3.5–5.3)
POTASSIUM SERPL-MCNC: 4.9 MMOL/L — SIGNIFICANT CHANGE UP (ref 3.5–5.3)
POTASSIUM SERPL-MCNC: 5 MMOL/L — SIGNIFICANT CHANGE UP (ref 3.5–5.3)
POTASSIUM SERPL-MCNC: 5.1 MMOL/L — SIGNIFICANT CHANGE UP (ref 3.5–5.3)
POTASSIUM SERPL-MCNC: 5.3 MMOL/L — SIGNIFICANT CHANGE UP (ref 3.5–5.3)
POTASSIUM SERPL-MCNC: 5.5 MMOL/L — HIGH (ref 3.5–5.3)
POTASSIUM SERPL-MCNC: SIGNIFICANT CHANGE UP MMOL/L (ref 3.5–5.3)
POTASSIUM SERPL-SCNC: 2.7 MMOL/L — CRITICAL LOW (ref 3.5–5.3)
POTASSIUM SERPL-SCNC: 2.8 MMOL/L — CRITICAL LOW (ref 3.5–5.3)
POTASSIUM SERPL-SCNC: 2.9 MMOL/L — CRITICAL LOW (ref 3.5–5.3)
POTASSIUM SERPL-SCNC: 3 MMOL/L — LOW (ref 3.5–5.3)
POTASSIUM SERPL-SCNC: 3 MMOL/L — LOW (ref 3.5–5.3)
POTASSIUM SERPL-SCNC: 3.1 MMOL/L — LOW (ref 3.5–5.3)
POTASSIUM SERPL-SCNC: 3.2 MMOL/L — LOW (ref 3.5–5.3)
POTASSIUM SERPL-SCNC: 3.3 MMOL/L — LOW (ref 3.5–5.3)
POTASSIUM SERPL-SCNC: 3.4 MMOL/L — LOW (ref 3.5–5.3)
POTASSIUM SERPL-SCNC: 3.5 MMOL/L — SIGNIFICANT CHANGE UP (ref 3.5–5.3)
POTASSIUM SERPL-SCNC: 3.6 MMOL/L — SIGNIFICANT CHANGE UP (ref 3.5–5.3)
POTASSIUM SERPL-SCNC: 3.7 MMOL/L — SIGNIFICANT CHANGE UP (ref 3.5–5.3)
POTASSIUM SERPL-SCNC: 3.8 MMOL/L — SIGNIFICANT CHANGE UP (ref 3.5–5.3)
POTASSIUM SERPL-SCNC: 3.9 MMOL/L — SIGNIFICANT CHANGE UP (ref 3.5–5.3)
POTASSIUM SERPL-SCNC: 4 MMOL/L — SIGNIFICANT CHANGE UP (ref 3.5–5.3)
POTASSIUM SERPL-SCNC: 4.1 MMOL/L — SIGNIFICANT CHANGE UP (ref 3.5–5.3)
POTASSIUM SERPL-SCNC: 4.2 MMOL/L — SIGNIFICANT CHANGE UP (ref 3.5–5.3)
POTASSIUM SERPL-SCNC: 4.4 MMOL/L — SIGNIFICANT CHANGE UP (ref 3.5–5.3)
POTASSIUM SERPL-SCNC: 4.5 MMOL/L — SIGNIFICANT CHANGE UP (ref 3.5–5.3)
POTASSIUM SERPL-SCNC: 4.5 MMOL/L — SIGNIFICANT CHANGE UP (ref 3.5–5.3)
POTASSIUM SERPL-SCNC: 4.8 MMOL/L — SIGNIFICANT CHANGE UP (ref 3.5–5.3)
POTASSIUM SERPL-SCNC: 4.9 MMOL/L — SIGNIFICANT CHANGE UP (ref 3.5–5.3)
POTASSIUM SERPL-SCNC: 4.9 MMOL/L — SIGNIFICANT CHANGE UP (ref 3.5–5.3)
POTASSIUM SERPL-SCNC: 5 MMOL/L — SIGNIFICANT CHANGE UP (ref 3.5–5.3)
POTASSIUM SERPL-SCNC: 5.1 MMOL/L — SIGNIFICANT CHANGE UP (ref 3.5–5.3)
POTASSIUM SERPL-SCNC: 5.3 MMOL/L — SIGNIFICANT CHANGE UP (ref 3.5–5.3)
POTASSIUM SERPL-SCNC: 5.5 MMOL/L — HIGH (ref 3.5–5.3)
POTASSIUM SERPL-SCNC: SIGNIFICANT CHANGE UP MMOL/L (ref 3.5–5.3)
POTASSIUM UR-SCNC: 20 MMOL/L — SIGNIFICANT CHANGE UP
PROCALCITONIN SERPL-MCNC: 0.16 NG/ML — HIGH (ref 0.02–0.1)
PROCALCITONIN SERPL-MCNC: 0.28 NG/ML — HIGH (ref 0.02–0.1)
PROCALCITONIN SERPL-MCNC: 0.68 NG/ML — HIGH (ref 0.02–0.1)
PROCALCITONIN SERPL-MCNC: 0.79 NG/ML — HIGH (ref 0.02–0.1)
PROCALCITONIN SERPL-MCNC: 0.98 NG/ML — HIGH (ref 0.02–0.1)
PROCALCITONIN SERPL-MCNC: 1.55 NG/ML — HIGH (ref 0.02–0.1)
PROCALCITONIN SERPL-MCNC: 1.81 NG/ML — HIGH (ref 0.02–0.1)
PROT PATTERN SERPL ELPH-IMP: SIGNIFICANT CHANGE UP
PROT SERPL-MCNC: 3.5 G/DL — LOW (ref 6–8.3)
PROT SERPL-MCNC: 3.5 G/DL — LOW (ref 6–8.3)
PROT SERPL-MCNC: 3.6 G/DL — LOW (ref 6–8.3)
PROT SERPL-MCNC: 3.6 G/DL — LOW (ref 6–8.3)
PROT SERPL-MCNC: 3.7 G/DL — LOW (ref 6–8.3)
PROT SERPL-MCNC: 3.8 G/DL — LOW (ref 6–8.3)
PROT SERPL-MCNC: 3.8 G/DL — LOW (ref 6–8.3)
PROT SERPL-MCNC: 3.9 G/DL — LOW (ref 6–8.3)
PROT SERPL-MCNC: 4 G/DL — LOW (ref 6–8.3)
PROT SERPL-MCNC: 4.2 G/DL — LOW (ref 6–8.3)
PROT SERPL-MCNC: 4.3 G/DL — LOW (ref 6–8.3)
PROT SERPL-MCNC: 4.3 G/DL — LOW (ref 6–8.3)
PROT SERPL-MCNC: 4.4 G/DL — LOW (ref 6–8.3)
PROT SERPL-MCNC: 4.5 G/DL — LOW (ref 6–8.3)
PROT SERPL-MCNC: 4.7 G/DL — LOW (ref 6–8.3)
PROT SERPL-MCNC: 4.7 G/DL — LOW (ref 6–8.3)
PROT SERPL-MCNC: 4.8 G/DL — LOW (ref 6–8.3)
PROT SERPL-MCNC: 4.8 G/DL — LOW (ref 6–8.3)
PROT SERPL-MCNC: 4.9 G/DL — LOW (ref 6–8.3)
PROT SERPL-MCNC: 5 G/DL — LOW (ref 6–8.3)
PROT SERPL-MCNC: 5 G/DL — LOW (ref 6–8.3)
PROT SERPL-MCNC: 5.1 G/DL — LOW (ref 6–8.3)
PROT SERPL-MCNC: 5.2 G/DL — LOW (ref 6–8.3)
PROT SERPL-MCNC: 5.2 G/DL — LOW (ref 6–8.3)
PROT SERPL-MCNC: 5.3 G/DL — LOW (ref 6–8.3)
PROT SERPL-MCNC: 5.4 G/DL — LOW (ref 6–8.3)
PROT SERPL-MCNC: 5.5 G/DL — LOW (ref 6–8.3)
PROT SERPL-MCNC: 5.6 G/DL — LOW (ref 6–8.3)
PROT SERPL-MCNC: 5.8 G/DL — LOW (ref 6–8.3)
PROT SERPL-MCNC: 6 G/DL — SIGNIFICANT CHANGE UP (ref 6–8.3)
PROT SERPL-MCNC: 6 G/DL — SIGNIFICANT CHANGE UP (ref 6–8.3)
PROT SERPL-MCNC: 6.1 G/DL — SIGNIFICANT CHANGE UP (ref 6–8.3)
PROT SERPL-MCNC: 6.2 G/DL — SIGNIFICANT CHANGE UP (ref 6–8.3)
PROT SERPL-MCNC: 6.3 G/DL — SIGNIFICANT CHANGE UP (ref 6–8.3)
PROT SERPL-MCNC: 6.4 G/DL — SIGNIFICANT CHANGE UP (ref 6–8.3)
PROT SERPL-MCNC: 6.5 G/DL — SIGNIFICANT CHANGE UP (ref 6–8.3)
PROT SERPL-MCNC: 6.6 G/DL — SIGNIFICANT CHANGE UP (ref 6–8.3)
PROT SERPL-MCNC: 6.7 G/DL — SIGNIFICANT CHANGE UP (ref 6–8.3)
PROT SERPL-MCNC: 6.8 G/DL — SIGNIFICANT CHANGE UP (ref 6–8.3)
PROT SERPL-MCNC: 6.9 G/DL — SIGNIFICANT CHANGE UP (ref 6–8.3)
PROT UR-MCNC: 30 MG/DL
PROT UR-MCNC: NEGATIVE — SIGNIFICANT CHANGE UP
PROTHROM AB SERPL-ACNC: 12.9 SEC — SIGNIFICANT CHANGE UP (ref 10.6–13.6)
PROTHROM AB SERPL-ACNC: 13.3 SEC — SIGNIFICANT CHANGE UP (ref 10.6–13.6)
PROTHROM AB SERPL-ACNC: 13.5 SEC — SIGNIFICANT CHANGE UP (ref 10.6–13.6)
PROTHROM AB SERPL-ACNC: 14.1 SEC — HIGH (ref 10–12.9)
PROTHROM AB SERPL-ACNC: 14.7 SEC — HIGH (ref 10–12.9)
PROTHROM AB SERPL-ACNC: 16.8 SEC — HIGH (ref 10.6–13.6)
PROTHROM AB SERPL-ACNC: 18.8 SEC — HIGH (ref 10.6–13.6)
PROTHROM AB SERPL-ACNC: 19 SEC — HIGH (ref 10.6–13.6)
PROTHROM AB SERPL-ACNC: 19.8 SEC — HIGH (ref 10.6–13.6)
PROTHROM AB SERPL-ACNC: 21.9 SEC — HIGH (ref 10.6–13.6)
PROTHROM AB SERPL-ACNC: 22.4 SEC — HIGH (ref 10–12.9)
PROTHROM AB SERPL-ACNC: 22.8 SEC — HIGH (ref 10.6–13.6)
PROTHROM AB SERPL-ACNC: 23.1 SEC — HIGH (ref 10–12.9)
PROTHROM AB SERPL-ACNC: 24.9 SEC — HIGH (ref 10.6–13.6)
PROTHROM AB SERPL-ACNC: 28.1 SEC — HIGH (ref 10–12.9)
PROTHROM AB SERPL-ACNC: 30.3 SEC — HIGH (ref 10–12.9)
PROTHROM AB SERPL-ACNC: 33 SEC — HIGH (ref 10–12.9)
PROTHROM AB SERPL-ACNC: 42.8 SEC — HIGH (ref 10–12.9)
PROTHROM AB SERPL-ACNC: 43 SEC — HIGH (ref 10–12.9)
PROTHROM AB SERPL-ACNC: 46.8 SEC — HIGH (ref 10.6–13.6)
PROTHROM AB SERPL-ACNC: SIGNIFICANT CHANGE UP (ref 10.6–13.6)
PT 100%: 17.6 SEC — HIGH (ref 10.6–13.6)
PT 100%: 18.5 SEC — HIGH (ref 10.6–13.6)
PT 50/50: 12.3 SEC — SIGNIFICANT CHANGE UP (ref 10.6–14.7)
PT 50/50: 12.7 SEC — SIGNIFICANT CHANGE UP (ref 10.6–14.7)
RAPID RVP RESULT: SIGNIFICANT CHANGE UP
RBC # BLD: 2.11 M/UL — LOW (ref 3.8–5.2)
RBC # BLD: 2.12 M/UL — LOW (ref 3.8–5.2)
RBC # BLD: 2.14 M/UL — LOW (ref 3.8–5.2)
RBC # BLD: 2.15 M/UL — LOW (ref 3.8–5.2)
RBC # BLD: 2.15 M/UL — LOW (ref 3.8–5.2)
RBC # BLD: 2.18 M/UL — LOW (ref 3.8–5.2)
RBC # BLD: 2.18 M/UL — LOW (ref 3.8–5.2)
RBC # BLD: 2.21 M/UL — LOW (ref 3.8–5.2)
RBC # BLD: 2.23 M/UL — LOW (ref 3.8–5.2)
RBC # BLD: 2.26 M/UL — LOW (ref 3.8–5.2)
RBC # BLD: 2.28 M/UL — LOW (ref 3.8–5.2)
RBC # BLD: 2.32 M/UL — LOW (ref 3.8–5.2)
RBC # BLD: 2.34 M/UL — LOW (ref 3.8–5.2)
RBC # BLD: 2.38 M/UL — LOW (ref 3.8–5.2)
RBC # BLD: 2.38 M/UL — LOW (ref 3.8–5.2)
RBC # BLD: 2.39 M/UL — LOW (ref 3.8–5.2)
RBC # BLD: 2.39 M/UL — LOW (ref 3.8–5.2)
RBC # BLD: 2.44 M/UL — LOW (ref 3.8–5.2)
RBC # BLD: 2.45 M/UL — LOW (ref 3.8–5.2)
RBC # BLD: 2.46 M/UL — LOW (ref 3.8–5.2)
RBC # BLD: 2.48 M/UL — LOW (ref 3.8–5.2)
RBC # BLD: 2.5 M/UL — LOW (ref 3.8–5.2)
RBC # BLD: 2.53 M/UL — LOW (ref 3.8–5.2)
RBC # BLD: 2.55 M/UL — LOW (ref 3.8–5.2)
RBC # BLD: 2.56 M/UL — LOW (ref 3.8–5.2)
RBC # BLD: 2.57 M/UL — LOW (ref 3.8–5.2)
RBC # BLD: 2.58 M/UL — LOW (ref 3.8–5.2)
RBC # BLD: 2.59 M/UL — LOW (ref 3.8–5.2)
RBC # BLD: 2.6 M/UL — LOW (ref 3.8–5.2)
RBC # BLD: 2.61 M/UL — LOW (ref 3.8–5.2)
RBC # BLD: 2.61 M/UL — LOW (ref 3.8–5.2)
RBC # BLD: 2.62 M/UL — LOW (ref 3.8–5.2)
RBC # BLD: 2.62 M/UL — LOW (ref 3.8–5.2)
RBC # BLD: 2.63 M/UL — LOW (ref 3.8–5.2)
RBC # BLD: 2.65 M/UL — LOW (ref 3.8–5.2)
RBC # BLD: 2.66 M/UL — LOW (ref 3.8–5.2)
RBC # BLD: 2.67 M/UL — LOW (ref 3.8–5.2)
RBC # BLD: 2.67 M/UL — LOW (ref 3.8–5.2)
RBC # BLD: 2.68 M/UL — LOW (ref 3.8–5.2)
RBC # BLD: 2.71 M/UL — LOW (ref 3.8–5.2)
RBC # BLD: 2.71 M/UL — LOW (ref 3.8–5.2)
RBC # BLD: 2.72 M/UL — LOW (ref 3.8–5.2)
RBC # BLD: 2.73 M/UL — LOW (ref 3.8–5.2)
RBC # BLD: 2.75 M/UL — LOW (ref 3.8–5.2)
RBC # BLD: 2.76 M/UL — LOW (ref 3.8–5.2)
RBC # BLD: 2.77 M/UL — LOW (ref 3.8–5.2)
RBC # BLD: 2.78 M/UL — LOW (ref 3.8–5.2)
RBC # BLD: 2.79 M/UL — LOW (ref 3.8–5.2)
RBC # BLD: 2.8 M/UL — LOW (ref 3.8–5.2)
RBC # BLD: 2.8 M/UL — LOW (ref 3.8–5.2)
RBC # BLD: 2.81 M/UL — LOW (ref 3.8–5.2)
RBC # BLD: 2.81 M/UL — LOW (ref 3.8–5.2)
RBC # BLD: 2.82 M/UL — LOW (ref 3.8–5.2)
RBC # BLD: 2.83 M/UL — LOW (ref 3.8–5.2)
RBC # BLD: 2.84 M/UL — LOW (ref 3.8–5.2)
RBC # BLD: 2.84 M/UL — LOW (ref 3.8–5.2)
RBC # BLD: 2.85 M/UL — LOW (ref 3.8–5.2)
RBC # BLD: 2.86 M/UL — LOW (ref 3.8–5.2)
RBC # BLD: 2.86 M/UL — LOW (ref 3.8–5.2)
RBC # BLD: 2.87 M/UL — LOW (ref 3.8–5.2)
RBC # BLD: 2.92 M/UL — LOW (ref 3.8–5.2)
RBC # BLD: 2.93 M/UL — LOW (ref 3.8–5.2)
RBC # BLD: 2.96 M/UL — LOW (ref 3.8–5.2)
RBC # BLD: 2.99 M/UL — LOW (ref 3.8–5.2)
RBC # BLD: 2.99 M/UL — LOW (ref 3.8–5.2)
RBC # BLD: 3.03 M/UL — LOW (ref 3.8–5.2)
RBC # BLD: 3.04 M/UL — LOW (ref 3.8–5.2)
RBC # BLD: 3.04 M/UL — LOW (ref 3.8–5.2)
RBC # BLD: 3.05 M/UL — LOW (ref 3.8–5.2)
RBC # BLD: 3.05 M/UL — LOW (ref 3.8–5.2)
RBC # BLD: 3.06 M/UL — LOW (ref 3.8–5.2)
RBC # BLD: 3.07 M/UL — LOW (ref 3.8–5.2)
RBC # BLD: 3.07 M/UL — LOW (ref 3.8–5.2)
RBC # BLD: 3.12 M/UL — LOW (ref 3.8–5.2)
RBC # BLD: 3.12 M/UL — LOW (ref 3.8–5.2)
RBC # BLD: 3.16 M/UL — LOW (ref 3.8–5.2)
RBC # BLD: 3.17 M/UL — LOW (ref 3.8–5.2)
RBC # BLD: 3.17 M/UL — LOW (ref 3.8–5.2)
RBC # BLD: 3.18 M/UL — LOW (ref 3.8–5.2)
RBC # BLD: 3.19 M/UL — LOW (ref 3.8–5.2)
RBC # BLD: 3.21 M/UL — LOW (ref 3.8–5.2)
RBC # BLD: 3.34 M/UL — LOW (ref 3.8–5.2)
RBC # BLD: 3.35 M/UL — LOW (ref 3.8–5.2)
RBC # BLD: 3.45 M/UL — LOW (ref 3.8–5.2)
RBC # BLD: 3.47 M/UL — LOW (ref 3.8–5.2)
RBC # BLD: 3.64 M/UL — LOW (ref 3.8–5.2)
RBC # BLD: 3.76 M/UL — LOW (ref 3.8–5.2)
RBC # BLD: 3.9 M/UL — SIGNIFICANT CHANGE UP (ref 3.8–5.2)
RBC # BLD: 3.94 M/UL — SIGNIFICANT CHANGE UP (ref 3.8–5.2)
RBC # FLD: 17 % — HIGH (ref 10.3–14.5)
RBC # FLD: 17.3 % — HIGH (ref 10.3–14.5)
RBC # FLD: 17.3 % — HIGH (ref 10.3–14.5)
RBC # FLD: 17.4 % — HIGH (ref 10.3–14.5)
RBC # FLD: 17.4 % — HIGH (ref 10.3–14.5)
RBC # FLD: 17.5 % — HIGH (ref 10.3–14.5)
RBC # FLD: 17.6 % — HIGH (ref 10.3–14.5)
RBC # FLD: 17.7 % — HIGH (ref 10.3–14.5)
RBC # FLD: 17.7 % — HIGH (ref 10.3–14.5)
RBC # FLD: 17.8 % — HIGH (ref 10.3–14.5)
RBC # FLD: 17.9 % — HIGH (ref 10.3–14.5)
RBC # FLD: 18 % — HIGH (ref 10.3–14.5)
RBC # FLD: 18.1 % — HIGH (ref 10.3–14.5)
RBC # FLD: 18.2 % — HIGH (ref 10.3–14.5)
RBC # FLD: 18.3 % — HIGH (ref 10.3–14.5)
RBC # FLD: 18.4 % — HIGH (ref 10.3–14.5)
RBC # FLD: 18.5 % — HIGH (ref 10.3–14.5)
RBC # FLD: 18.6 % — HIGH (ref 10.3–14.5)
RBC # FLD: 18.8 % — HIGH (ref 10.3–14.5)
RBC # FLD: 18.9 % — HIGH (ref 10.3–14.5)
RBC # FLD: 19 % — HIGH (ref 10.3–14.5)
RBC # FLD: 19 % — HIGH (ref 10.3–14.5)
RBC # FLD: 19.1 % — HIGH (ref 10.3–14.5)
RBC # FLD: 19.4 % — HIGH (ref 10.3–14.5)
RBC # FLD: 19.4 % — HIGH (ref 10.3–14.5)
RBC # FLD: 19.5 % — HIGH (ref 10.3–14.5)
RBC # FLD: 19.6 % — HIGH (ref 10.3–14.5)
RBC # FLD: 19.8 % — HIGH (ref 10.3–14.5)
RBC # FLD: 19.9 % — HIGH (ref 10.3–14.5)
RBC # FLD: 20 % — HIGH (ref 10.3–14.5)
RBC # FLD: 20.3 % — HIGH (ref 10.3–14.5)
RBC # FLD: 20.4 % — HIGH (ref 10.3–14.5)
RBC # FLD: 20.5 % — HIGH (ref 10.3–14.5)
RBC # FLD: 20.6 % — HIGH (ref 10.3–14.5)
RBC # FLD: 20.6 % — HIGH (ref 10.3–14.5)
RBC # FLD: 20.7 % — HIGH (ref 10.3–14.5)
RBC # FLD: 20.7 % — HIGH (ref 10.3–14.5)
RBC # FLD: 20.9 % — HIGH (ref 10.3–14.5)
RBC # FLD: 21 % — HIGH (ref 10.3–14.5)
RBC # FLD: 21 % — HIGH (ref 10.3–14.5)
RBC # FLD: 21.1 % — HIGH (ref 10.3–14.5)
RBC # FLD: 21.2 % — HIGH (ref 10.3–14.5)
RBC # FLD: 21.2 % — HIGH (ref 10.3–14.5)
RBC # FLD: 21.3 % — HIGH (ref 10.3–14.5)
RBC # FLD: 21.4 % — HIGH (ref 10.3–14.5)
RBC # FLD: 21.5 % — HIGH (ref 10.3–14.5)
RBC # FLD: 21.6 % — HIGH (ref 10.3–14.5)
RBC # FLD: 21.7 % — HIGH (ref 10.3–14.5)
RBC # FLD: 21.8 % — HIGH (ref 10.3–14.5)
RBC # FLD: 21.8 % — HIGH (ref 10.3–14.5)
RBC # FLD: 21.9 % — HIGH (ref 10.3–14.5)
RBC # FLD: 21.9 % — HIGH (ref 10.3–14.5)
RBC # FLD: 22 % — HIGH (ref 10.3–14.5)
RBC # FLD: 22.2 % — HIGH (ref 10.3–14.5)
RBC # FLD: 22.2 % — HIGH (ref 10.3–14.5)
RBC # FLD: 22.4 % — HIGH (ref 10.3–14.5)
RBC # FLD: 22.5 % — HIGH (ref 10.3–14.5)
RBC # FLD: 23 % — HIGH (ref 10.3–14.5)
RBC BLD AUTO: ABNORMAL
RBC CASTS # UR COMP ASSIST: ABNORMAL /HPF (ref 0–2)
RBC CASTS # UR COMP ASSIST: SIGNIFICANT CHANGE UP /HPF (ref 0–2)
S AUREUS DNA NOSE QL NAA+PROBE: DETECTED
S MARCESCENS DNA BLD POS NAA+NON-PROBE: SIGNIFICANT CHANGE UP
S PNEUM DNA BLD POS QL NAA+NON-PROBE: SIGNIFICANT CHANGE UP
S PYO DNA BLD POS QL NAA+NON-PROBE: SIGNIFICANT CHANGE UP
SAO2 % BLDA: 56 % — LOW (ref 92–96)
SAO2 % BLDA: 89 % — LOW (ref 92–96)
SAO2 % BLDA: 92 % — SIGNIFICANT CHANGE UP (ref 92–96)
SAO2 % BLDA: 94 % — SIGNIFICANT CHANGE UP (ref 92–96)
SAO2 % BLDA: 94 % — SIGNIFICANT CHANGE UP (ref 92–96)
SAO2 % BLDA: 95 % — SIGNIFICANT CHANGE UP (ref 92–96)
SAO2 % BLDA: 96 % — SIGNIFICANT CHANGE UP (ref 92–96)
SAO2 % BLDA: 96 % — SIGNIFICANT CHANGE UP (ref 92–96)
SAO2 % BLDA: 98 % — HIGH (ref 92–96)
SAO2 % BLDA: 99 % — HIGH (ref 92–96)
SAO2 % BLDA: 99 % — HIGH (ref 92–96)
SAO2 % BLDV: 62 % — LOW (ref 67–88)
SAO2 % BLDV: 75 % — SIGNIFICANT CHANGE UP (ref 67–88)
SAO2 % BLDV: 77 % — SIGNIFICANT CHANGE UP (ref 67–88)
SARS-COV-2 IGG SERPL QL IA: NEGATIVE — SIGNIFICANT CHANGE UP
SARS-COV-2 IGM SERPL IA-ACNC: 0.18 INDEX — SIGNIFICANT CHANGE UP
SARS-COV-2 RNA SPEC QL NAA+PROBE: SIGNIFICANT CHANGE UP
SCHISTOCYTES BLD QL AUTO: SLIGHT — SIGNIFICANT CHANGE UP
SMOOTH MUSCLE AB SER-ACNC: ABNORMAL
SMUDGE CELLS # BLD: PRESENT — SIGNIFICANT CHANGE UP
SODIUM SERPL-SCNC: 135 MMOL/L — SIGNIFICANT CHANGE UP (ref 135–145)
SODIUM SERPL-SCNC: 136 MMOL/L — SIGNIFICANT CHANGE UP (ref 135–145)
SODIUM SERPL-SCNC: 137 MMOL/L — SIGNIFICANT CHANGE UP (ref 135–145)
SODIUM SERPL-SCNC: 138 MMOL/L — SIGNIFICANT CHANGE UP (ref 135–145)
SODIUM SERPL-SCNC: 138 MMOL/L — SIGNIFICANT CHANGE UP (ref 135–145)
SODIUM SERPL-SCNC: 139 MMOL/L — SIGNIFICANT CHANGE UP (ref 135–145)
SODIUM SERPL-SCNC: 140 MMOL/L — SIGNIFICANT CHANGE UP (ref 135–145)
SODIUM SERPL-SCNC: 141 MMOL/L — SIGNIFICANT CHANGE UP (ref 135–145)
SODIUM SERPL-SCNC: 142 MMOL/L — SIGNIFICANT CHANGE UP (ref 135–145)
SODIUM SERPL-SCNC: 143 MMOL/L — SIGNIFICANT CHANGE UP (ref 135–145)
SODIUM SERPL-SCNC: 144 MMOL/L — SIGNIFICANT CHANGE UP (ref 135–145)
SODIUM SERPL-SCNC: 145 MMOL/L — SIGNIFICANT CHANGE UP (ref 135–145)
SODIUM SERPL-SCNC: 146 MMOL/L — HIGH (ref 135–145)
SODIUM SERPL-SCNC: 147 MMOL/L — HIGH (ref 135–145)
SODIUM SERPL-SCNC: 148 MMOL/L — HIGH (ref 135–145)
SODIUM SERPL-SCNC: 149 MMOL/L — HIGH (ref 135–145)
SODIUM SERPL-SCNC: 149 MMOL/L — HIGH (ref 135–145)
SODIUM SERPL-SCNC: 150 MMOL/L — HIGH (ref 135–145)
SODIUM SERPL-SCNC: 150 MMOL/L — HIGH (ref 135–145)
SODIUM SERPL-SCNC: 151 MMOL/L — HIGH (ref 135–145)
SODIUM SERPL-SCNC: 151 MMOL/L — HIGH (ref 135–145)
SODIUM SERPL-SCNC: 152 MMOL/L — HIGH (ref 135–145)
SODIUM SERPL-SCNC: 152 MMOL/L — HIGH (ref 135–145)
SODIUM SERPL-SCNC: 154 MMOL/L — HIGH (ref 135–145)
SODIUM SERPL-SCNC: SIGNIFICANT CHANGE UP MMOL/L (ref 135–145)
SODIUM UR-SCNC: 76 MMOL/L — SIGNIFICANT CHANGE UP
SOLUBLE LIVER IGG SER IA-ACNC: 0.9 — SIGNIFICANT CHANGE UP (ref 0–20)
SP GR SPEC: 1 — LOW (ref 1.01–1.02)
SP GR SPEC: 1.01 — SIGNIFICANT CHANGE UP (ref 1.01–1.02)
SPECIMEN SOURCE: SIGNIFICANT CHANGE UP
SPHEROCYTES BLD QL SMEAR: SLIGHT — SIGNIFICANT CHANGE UP
SPHEROCYTES BLD QL SMEAR: SLIGHT — SIGNIFICANT CHANGE UP
SRA INTERP SER-IMP: SIGNIFICANT CHANGE UP
STOMATOCYTES BLD QL SMEAR: SLIGHT — SIGNIFICANT CHANGE UP
STOMATOCYTES BLD QL SMEAR: SLIGHT — SIGNIFICANT CHANGE UP
SURGICAL PATHOLOGY STUDY: SIGNIFICANT CHANGE UP
TARGETS BLD QL SMEAR: SIGNIFICANT CHANGE UP
TARGETS BLD QL SMEAR: SLIGHT — SIGNIFICANT CHANGE UP
THROMBIN TIME: 29.4 SEC — HIGH (ref 16–25)
THROMBIN TIME: 29.8 SEC — HIGH (ref 16–25)
TIBC SERPL-MCNC: 111 UG/DL — LOW (ref 250–450)
TIBC SERPL-MCNC: 63 UG/DL — LOW (ref 250–450)
TIBC SERPL-MCNC: 66 UG/DL — LOW (ref 250–450)
TIBC SERPL-MCNC: 69 UG/DL — LOW (ref 250–450)
TIBC SERPL-MCNC: 92 UG/DL — LOW (ref 250–450)
TOTAL CHOLESTEROL/HDL RATIO MEASUREMENT: 12.2 RATIO — HIGH (ref 3.3–7.1)
TOTAL CHOLESTEROL/HDL RATIO MEASUREMENT: 13.4 RATIO — HIGH (ref 3.3–7.1)
TOTAL CHOLESTEROL/HDL RATIO MEASUREMENT: 2.2 RATIO — LOW (ref 3.3–7.1)
TRANSFERRIN SERPL-MCNC: 27 MG/DL — LOW (ref 200–360)
TRANSFERRIN SERPL-MCNC: 45 MG/DL — LOW (ref 200–360)
TRANSFERRIN SERPL-MCNC: 49 MG/DL — LOW (ref 200–360)
TRIGL SERPL-MCNC: 106 MG/DL — SIGNIFICANT CHANGE UP (ref 10–149)
TRIGL SERPL-MCNC: 143 MG/DL — SIGNIFICANT CHANGE UP (ref 10–149)
TRIGL SERPL-MCNC: 56 MG/DL — SIGNIFICANT CHANGE UP
TRIGL SERPL-MCNC: 58 MG/DL — SIGNIFICANT CHANGE UP
TRIGL SERPL-MCNC: 59 MG/DL — SIGNIFICANT CHANGE UP
TRIGL SERPL-MCNC: 60 MG/DL — SIGNIFICANT CHANGE UP (ref 10–149)
TRIGL SERPL-MCNC: 69 MG/DL — SIGNIFICANT CHANGE UP
TRIGL SERPL-MCNC: 86 MG/DL — SIGNIFICANT CHANGE UP
TRIGL SERPL-MCNC: 87 MG/DL — SIGNIFICANT CHANGE UP
TROPONIN I SERPL-MCNC: 0.05 NG/ML — HIGH (ref 0–0.04)
TROPONIN I SERPL-MCNC: <0.015 NG/ML — SIGNIFICANT CHANGE UP (ref 0–0.04)
TSH SERPL-MCNC: 1.51 UU/ML — SIGNIFICANT CHANGE UP (ref 0.34–4.82)
TSH SERPL-MCNC: 1.59 UU/ML — SIGNIFICANT CHANGE UP (ref 0.34–4.82)
TSH SERPL-MCNC: 1.59 UU/ML — SIGNIFICANT CHANGE UP (ref 0.34–4.82)
UIBC SERPL-MCNC: 1 UG/DL — LOW (ref 110–370)
UIBC SERPL-MCNC: 28 UG/DL — LOW (ref 110–370)
UIBC SERPL-MCNC: 32 UG/DL — LOW (ref 110–370)
UIBC SERPL-MCNC: 4 UG/DL — LOW (ref 110–370)
UIBC SERPL-MCNC: 92 UG/DL — LOW (ref 110–370)
UROBILINOGEN FLD QL: 1
UROBILINOGEN FLD QL: NEGATIVE — SIGNIFICANT CHANGE UP
VANCOMYCIN TROUGH SERPL-MCNC: 16.1 UG/ML — SIGNIFICANT CHANGE UP (ref 10–20)
VANCOMYCIN TROUGH SERPL-MCNC: 16.1 UG/ML — SIGNIFICANT CHANGE UP (ref 10–20)
VANCOMYCIN TROUGH SERPL-MCNC: 16.2 UG/ML — SIGNIFICANT CHANGE UP (ref 10–20)
VANCOMYCIN TROUGH SERPL-MCNC: 16.7 UG/ML — SIGNIFICANT CHANGE UP (ref 10–20)
VANCOMYCIN TROUGH SERPL-MCNC: 19.4 UG/ML — SIGNIFICANT CHANGE UP (ref 10–20)
VANCOMYCIN TROUGH SERPL-MCNC: 19.5 UG/ML — SIGNIFICANT CHANGE UP (ref 10–20)
VANCOMYCIN TROUGH SERPL-MCNC: 19.9 UG/ML — SIGNIFICANT CHANGE UP (ref 10–20)
VANCOMYCIN TROUGH SERPL-MCNC: 20.5 UG/ML — HIGH (ref 10–20)
VANCOMYCIN TROUGH SERPL-MCNC: 20.9 UG/ML — HIGH (ref 10–20)
VANCOMYCIN TROUGH SERPL-MCNC: 21.2 UG/ML — HIGH (ref 10–20)
VANCOMYCIN TROUGH SERPL-MCNC: 23.2 UG/ML — HIGH (ref 10–20)
VANCOMYCIN TROUGH SERPL-MCNC: 28.1 UG/ML — CRITICAL HIGH (ref 10–20)
VANCOMYCIN TROUGH SERPL-MCNC: 31 UG/ML — CRITICAL HIGH (ref 10–20)
VANCOMYCIN TROUGH SERPL-MCNC: 36.3 UG/ML — CRITICAL HIGH (ref 10–20)
VARIANT LYMPHS # BLD: 16 % — HIGH (ref 0–6)
VARIANT LYMPHS # BLD: 3 % — SIGNIFICANT CHANGE UP (ref 0–6)
VIT B12 SERPL-MCNC: 1031 PG/ML — SIGNIFICANT CHANGE UP (ref 232–1245)
VIT B12 SERPL-MCNC: 1180 PG/ML — SIGNIFICANT CHANGE UP (ref 232–1245)
VIT B12 SERPL-MCNC: >2000 PG/ML — HIGH (ref 232–1245)
WBC # BLD: 10.08 K/UL — SIGNIFICANT CHANGE UP (ref 3.8–10.5)
WBC # BLD: 10.27 K/UL — SIGNIFICANT CHANGE UP (ref 3.8–10.5)
WBC # BLD: 10.41 K/UL — SIGNIFICANT CHANGE UP (ref 3.8–10.5)
WBC # BLD: 10.54 K/UL — HIGH (ref 3.8–10.5)
WBC # BLD: 10.77 K/UL — HIGH (ref 3.8–10.5)
WBC # BLD: 10.92 K/UL — HIGH (ref 3.8–10.5)
WBC # BLD: 10.92 K/UL — HIGH (ref 3.8–10.5)
WBC # BLD: 11.12 K/UL — HIGH (ref 3.8–10.5)
WBC # BLD: 11.15 K/UL — HIGH (ref 3.8–10.5)
WBC # BLD: 11.27 K/UL — HIGH (ref 3.8–10.5)
WBC # BLD: 11.43 K/UL — HIGH (ref 3.8–10.5)
WBC # BLD: 11.54 K/UL — HIGH (ref 3.8–10.5)
WBC # BLD: 11.57 K/UL — HIGH (ref 3.8–10.5)
WBC # BLD: 11.69 K/UL — HIGH (ref 3.8–10.5)
WBC # BLD: 12.15 K/UL — HIGH (ref 3.8–10.5)
WBC # BLD: 13.14 K/UL — HIGH (ref 3.8–10.5)
WBC # BLD: 2.49 K/UL — LOW (ref 3.8–10.5)
WBC # BLD: 3.33 K/UL — LOW (ref 3.8–10.5)
WBC # BLD: 3.33 K/UL — LOW (ref 3.8–10.5)
WBC # BLD: 3.35 K/UL — LOW (ref 3.8–10.5)
WBC # BLD: 3.38 K/UL — LOW (ref 3.8–10.5)
WBC # BLD: 3.7 K/UL — LOW (ref 3.8–10.5)
WBC # BLD: 3.95 K/UL — SIGNIFICANT CHANGE UP (ref 3.8–10.5)
WBC # BLD: 3.95 K/UL — SIGNIFICANT CHANGE UP (ref 3.8–10.5)
WBC # BLD: 4.08 K/UL — SIGNIFICANT CHANGE UP (ref 3.8–10.5)
WBC # BLD: 4.21 K/UL — SIGNIFICANT CHANGE UP (ref 3.8–10.5)
WBC # BLD: 4.27 K/UL — SIGNIFICANT CHANGE UP (ref 3.8–10.5)
WBC # BLD: 4.55 K/UL — SIGNIFICANT CHANGE UP (ref 3.8–10.5)
WBC # BLD: 4.55 K/UL — SIGNIFICANT CHANGE UP (ref 3.8–10.5)
WBC # BLD: 4.74 K/UL — SIGNIFICANT CHANGE UP (ref 3.8–10.5)
WBC # BLD: 4.77 K/UL — SIGNIFICANT CHANGE UP (ref 3.8–10.5)
WBC # BLD: 4.87 K/UL — SIGNIFICANT CHANGE UP (ref 3.8–10.5)
WBC # BLD: 5.04 K/UL — SIGNIFICANT CHANGE UP (ref 3.8–10.5)
WBC # BLD: 5.07 K/UL — SIGNIFICANT CHANGE UP (ref 3.8–10.5)
WBC # BLD: 5.26 K/UL — SIGNIFICANT CHANGE UP (ref 3.8–10.5)
WBC # BLD: 5.35 K/UL — SIGNIFICANT CHANGE UP (ref 3.8–10.5)
WBC # BLD: 5.4 K/UL — SIGNIFICANT CHANGE UP (ref 3.8–10.5)
WBC # BLD: 5.41 K/UL — SIGNIFICANT CHANGE UP (ref 3.8–10.5)
WBC # BLD: 5.42 K/UL — SIGNIFICANT CHANGE UP (ref 3.8–10.5)
WBC # BLD: 5.59 K/UL — SIGNIFICANT CHANGE UP (ref 3.8–10.5)
WBC # BLD: 5.63 K/UL — SIGNIFICANT CHANGE UP (ref 3.8–10.5)
WBC # BLD: 5.64 K/UL — SIGNIFICANT CHANGE UP (ref 3.8–10.5)
WBC # BLD: 5.74 K/UL — SIGNIFICANT CHANGE UP (ref 3.8–10.5)
WBC # BLD: 5.81 K/UL — SIGNIFICANT CHANGE UP (ref 3.8–10.5)
WBC # BLD: 5.81 K/UL — SIGNIFICANT CHANGE UP (ref 3.8–10.5)
WBC # BLD: 5.83 K/UL — SIGNIFICANT CHANGE UP (ref 3.8–10.5)
WBC # BLD: 5.93 K/UL — SIGNIFICANT CHANGE UP (ref 3.8–10.5)
WBC # BLD: 5.93 K/UL — SIGNIFICANT CHANGE UP (ref 3.8–10.5)
WBC # BLD: 5.96 K/UL — SIGNIFICANT CHANGE UP (ref 3.8–10.5)
WBC # BLD: 6 K/UL — SIGNIFICANT CHANGE UP (ref 3.8–10.5)
WBC # BLD: 6.08 K/UL — SIGNIFICANT CHANGE UP (ref 3.8–10.5)
WBC # BLD: 6.12 K/UL — SIGNIFICANT CHANGE UP (ref 3.8–10.5)
WBC # BLD: 6.15 K/UL — SIGNIFICANT CHANGE UP (ref 3.8–10.5)
WBC # BLD: 6.17 K/UL — SIGNIFICANT CHANGE UP (ref 3.8–10.5)
WBC # BLD: 6.51 K/UL — SIGNIFICANT CHANGE UP (ref 3.8–10.5)
WBC # BLD: 6.62 K/UL — SIGNIFICANT CHANGE UP (ref 3.8–10.5)
WBC # BLD: 6.63 K/UL — SIGNIFICANT CHANGE UP (ref 3.8–10.5)
WBC # BLD: 6.78 K/UL — SIGNIFICANT CHANGE UP (ref 3.8–10.5)
WBC # BLD: 6.79 K/UL — SIGNIFICANT CHANGE UP (ref 3.8–10.5)
WBC # BLD: 6.8 K/UL — SIGNIFICANT CHANGE UP (ref 3.8–10.5)
WBC # BLD: 6.83 K/UL — SIGNIFICANT CHANGE UP (ref 3.8–10.5)
WBC # BLD: 6.89 K/UL — SIGNIFICANT CHANGE UP (ref 3.8–10.5)
WBC # BLD: 6.94 K/UL — SIGNIFICANT CHANGE UP (ref 3.8–10.5)
WBC # BLD: 7.07 K/UL — SIGNIFICANT CHANGE UP (ref 3.8–10.5)
WBC # BLD: 7.08 K/UL — SIGNIFICANT CHANGE UP (ref 3.8–10.5)
WBC # BLD: 7.09 K/UL — SIGNIFICANT CHANGE UP (ref 3.8–10.5)
WBC # BLD: 7.15 K/UL — SIGNIFICANT CHANGE UP (ref 3.8–10.5)
WBC # BLD: 7.22 K/UL — SIGNIFICANT CHANGE UP (ref 3.8–10.5)
WBC # BLD: 7.25 K/UL — SIGNIFICANT CHANGE UP (ref 3.8–10.5)
WBC # BLD: 7.27 K/UL — SIGNIFICANT CHANGE UP (ref 3.8–10.5)
WBC # BLD: 7.35 K/UL — SIGNIFICANT CHANGE UP (ref 3.8–10.5)
WBC # BLD: 7.43 K/UL — SIGNIFICANT CHANGE UP (ref 3.8–10.5)
WBC # BLD: 7.44 K/UL — SIGNIFICANT CHANGE UP (ref 3.8–10.5)
WBC # BLD: 7.5 K/UL — SIGNIFICANT CHANGE UP (ref 3.8–10.5)
WBC # BLD: 7.51 K/UL — SIGNIFICANT CHANGE UP (ref 3.8–10.5)
WBC # BLD: 7.55 K/UL — SIGNIFICANT CHANGE UP (ref 3.8–10.5)
WBC # BLD: 7.6 K/UL — SIGNIFICANT CHANGE UP (ref 3.8–10.5)
WBC # BLD: 7.63 K/UL — SIGNIFICANT CHANGE UP (ref 3.8–10.5)
WBC # BLD: 7.67 K/UL — SIGNIFICANT CHANGE UP (ref 3.8–10.5)
WBC # BLD: 7.74 K/UL — SIGNIFICANT CHANGE UP (ref 3.8–10.5)
WBC # BLD: 7.76 K/UL — SIGNIFICANT CHANGE UP (ref 3.8–10.5)
WBC # BLD: 7.81 K/UL — SIGNIFICANT CHANGE UP (ref 3.8–10.5)
WBC # BLD: 7.81 K/UL — SIGNIFICANT CHANGE UP (ref 3.8–10.5)
WBC # BLD: 7.88 K/UL — SIGNIFICANT CHANGE UP (ref 3.8–10.5)
WBC # BLD: 7.91 K/UL — SIGNIFICANT CHANGE UP (ref 3.8–10.5)
WBC # BLD: 7.99 K/UL — SIGNIFICANT CHANGE UP (ref 3.8–10.5)
WBC # BLD: 8.37 K/UL — SIGNIFICANT CHANGE UP (ref 3.8–10.5)
WBC # BLD: 8.37 K/UL — SIGNIFICANT CHANGE UP (ref 3.8–10.5)
WBC # BLD: 8.4 K/UL — SIGNIFICANT CHANGE UP (ref 3.8–10.5)
WBC # BLD: 8.41 K/UL — SIGNIFICANT CHANGE UP (ref 3.8–10.5)
WBC # BLD: 8.42 K/UL — SIGNIFICANT CHANGE UP (ref 3.8–10.5)
WBC # BLD: 8.52 K/UL — SIGNIFICANT CHANGE UP (ref 3.8–10.5)
WBC # BLD: 8.62 K/UL — SIGNIFICANT CHANGE UP (ref 3.8–10.5)
WBC # BLD: 8.7 K/UL — SIGNIFICANT CHANGE UP (ref 3.8–10.5)
WBC # BLD: 8.76 K/UL — SIGNIFICANT CHANGE UP (ref 3.8–10.5)
WBC # BLD: 8.81 K/UL — SIGNIFICANT CHANGE UP (ref 3.8–10.5)
WBC # BLD: 8.82 K/UL — SIGNIFICANT CHANGE UP (ref 3.8–10.5)
WBC # BLD: 8.86 K/UL — SIGNIFICANT CHANGE UP (ref 3.8–10.5)
WBC # BLD: 9.01 K/UL — SIGNIFICANT CHANGE UP (ref 3.8–10.5)
WBC # BLD: 9.13 K/UL — SIGNIFICANT CHANGE UP (ref 3.8–10.5)
WBC # BLD: 9.24 K/UL — SIGNIFICANT CHANGE UP (ref 3.8–10.5)
WBC # BLD: 9.27 K/UL — SIGNIFICANT CHANGE UP (ref 3.8–10.5)
WBC # BLD: 9.36 K/UL — SIGNIFICANT CHANGE UP (ref 3.8–10.5)
WBC # BLD: 9.4 K/UL — SIGNIFICANT CHANGE UP (ref 3.8–10.5)
WBC # BLD: 9.41 K/UL — SIGNIFICANT CHANGE UP (ref 3.8–10.5)
WBC # BLD: 9.46 K/UL — SIGNIFICANT CHANGE UP (ref 3.8–10.5)
WBC # BLD: 9.57 K/UL — SIGNIFICANT CHANGE UP (ref 3.8–10.5)
WBC # BLD: 9.62 K/UL — SIGNIFICANT CHANGE UP (ref 3.8–10.5)
WBC # BLD: 9.74 K/UL — SIGNIFICANT CHANGE UP (ref 3.8–10.5)
WBC # BLD: 9.84 K/UL — SIGNIFICANT CHANGE UP (ref 3.8–10.5)
WBC # FLD AUTO: 10.08 K/UL — SIGNIFICANT CHANGE UP (ref 3.8–10.5)
WBC # FLD AUTO: 10.27 K/UL — SIGNIFICANT CHANGE UP (ref 3.8–10.5)
WBC # FLD AUTO: 10.41 K/UL — SIGNIFICANT CHANGE UP (ref 3.8–10.5)
WBC # FLD AUTO: 10.54 K/UL — HIGH (ref 3.8–10.5)
WBC # FLD AUTO: 10.77 K/UL — HIGH (ref 3.8–10.5)
WBC # FLD AUTO: 10.92 K/UL — HIGH (ref 3.8–10.5)
WBC # FLD AUTO: 10.92 K/UL — HIGH (ref 3.8–10.5)
WBC # FLD AUTO: 11.12 K/UL — HIGH (ref 3.8–10.5)
WBC # FLD AUTO: 11.15 K/UL — HIGH (ref 3.8–10.5)
WBC # FLD AUTO: 11.27 K/UL — HIGH (ref 3.8–10.5)
WBC # FLD AUTO: 11.43 K/UL — HIGH (ref 3.8–10.5)
WBC # FLD AUTO: 11.54 K/UL — HIGH (ref 3.8–10.5)
WBC # FLD AUTO: 11.57 K/UL — HIGH (ref 3.8–10.5)
WBC # FLD AUTO: 11.69 K/UL — HIGH (ref 3.8–10.5)
WBC # FLD AUTO: 12.15 K/UL — HIGH (ref 3.8–10.5)
WBC # FLD AUTO: 13.14 K/UL — HIGH (ref 3.8–10.5)
WBC # FLD AUTO: 2.49 K/UL — LOW (ref 3.8–10.5)
WBC # FLD AUTO: 3.33 K/UL — LOW (ref 3.8–10.5)
WBC # FLD AUTO: 3.33 K/UL — LOW (ref 3.8–10.5)
WBC # FLD AUTO: 3.35 K/UL — LOW (ref 3.8–10.5)
WBC # FLD AUTO: 3.38 K/UL — LOW (ref 3.8–10.5)
WBC # FLD AUTO: 3.7 K/UL — LOW (ref 3.8–10.5)
WBC # FLD AUTO: 3.95 K/UL — SIGNIFICANT CHANGE UP (ref 3.8–10.5)
WBC # FLD AUTO: 3.95 K/UL — SIGNIFICANT CHANGE UP (ref 3.8–10.5)
WBC # FLD AUTO: 4.08 K/UL — SIGNIFICANT CHANGE UP (ref 3.8–10.5)
WBC # FLD AUTO: 4.21 K/UL — SIGNIFICANT CHANGE UP (ref 3.8–10.5)
WBC # FLD AUTO: 4.27 K/UL — SIGNIFICANT CHANGE UP (ref 3.8–10.5)
WBC # FLD AUTO: 4.55 K/UL — SIGNIFICANT CHANGE UP (ref 3.8–10.5)
WBC # FLD AUTO: 4.55 K/UL — SIGNIFICANT CHANGE UP (ref 3.8–10.5)
WBC # FLD AUTO: 4.74 K/UL — SIGNIFICANT CHANGE UP (ref 3.8–10.5)
WBC # FLD AUTO: 4.77 K/UL — SIGNIFICANT CHANGE UP (ref 3.8–10.5)
WBC # FLD AUTO: 4.87 K/UL — SIGNIFICANT CHANGE UP (ref 3.8–10.5)
WBC # FLD AUTO: 5.04 K/UL — SIGNIFICANT CHANGE UP (ref 3.8–10.5)
WBC # FLD AUTO: 5.07 K/UL — SIGNIFICANT CHANGE UP (ref 3.8–10.5)
WBC # FLD AUTO: 5.26 K/UL — SIGNIFICANT CHANGE UP (ref 3.8–10.5)
WBC # FLD AUTO: 5.35 K/UL — SIGNIFICANT CHANGE UP (ref 3.8–10.5)
WBC # FLD AUTO: 5.4 K/UL — SIGNIFICANT CHANGE UP (ref 3.8–10.5)
WBC # FLD AUTO: 5.41 K/UL — SIGNIFICANT CHANGE UP (ref 3.8–10.5)
WBC # FLD AUTO: 5.42 K/UL — SIGNIFICANT CHANGE UP (ref 3.8–10.5)
WBC # FLD AUTO: 5.59 K/UL — SIGNIFICANT CHANGE UP (ref 3.8–10.5)
WBC # FLD AUTO: 5.63 K/UL — SIGNIFICANT CHANGE UP (ref 3.8–10.5)
WBC # FLD AUTO: 5.64 K/UL — SIGNIFICANT CHANGE UP (ref 3.8–10.5)
WBC # FLD AUTO: 5.74 K/UL — SIGNIFICANT CHANGE UP (ref 3.8–10.5)
WBC # FLD AUTO: 5.81 K/UL — SIGNIFICANT CHANGE UP (ref 3.8–10.5)
WBC # FLD AUTO: 5.81 K/UL — SIGNIFICANT CHANGE UP (ref 3.8–10.5)
WBC # FLD AUTO: 5.83 K/UL — SIGNIFICANT CHANGE UP (ref 3.8–10.5)
WBC # FLD AUTO: 5.93 K/UL — SIGNIFICANT CHANGE UP (ref 3.8–10.5)
WBC # FLD AUTO: 5.93 K/UL — SIGNIFICANT CHANGE UP (ref 3.8–10.5)
WBC # FLD AUTO: 5.96 K/UL — SIGNIFICANT CHANGE UP (ref 3.8–10.5)
WBC # FLD AUTO: 6 K/UL — SIGNIFICANT CHANGE UP (ref 3.8–10.5)
WBC # FLD AUTO: 6.08 K/UL — SIGNIFICANT CHANGE UP (ref 3.8–10.5)
WBC # FLD AUTO: 6.12 K/UL — SIGNIFICANT CHANGE UP (ref 3.8–10.5)
WBC # FLD AUTO: 6.15 K/UL — SIGNIFICANT CHANGE UP (ref 3.8–10.5)
WBC # FLD AUTO: 6.17 K/UL — SIGNIFICANT CHANGE UP (ref 3.8–10.5)
WBC # FLD AUTO: 6.51 K/UL — SIGNIFICANT CHANGE UP (ref 3.8–10.5)
WBC # FLD AUTO: 6.62 K/UL — SIGNIFICANT CHANGE UP (ref 3.8–10.5)
WBC # FLD AUTO: 6.63 K/UL — SIGNIFICANT CHANGE UP (ref 3.8–10.5)
WBC # FLD AUTO: 6.78 K/UL — SIGNIFICANT CHANGE UP (ref 3.8–10.5)
WBC # FLD AUTO: 6.79 K/UL — SIGNIFICANT CHANGE UP (ref 3.8–10.5)
WBC # FLD AUTO: 6.8 K/UL — SIGNIFICANT CHANGE UP (ref 3.8–10.5)
WBC # FLD AUTO: 6.83 K/UL — SIGNIFICANT CHANGE UP (ref 3.8–10.5)
WBC # FLD AUTO: 6.89 K/UL — SIGNIFICANT CHANGE UP (ref 3.8–10.5)
WBC # FLD AUTO: 6.94 K/UL — SIGNIFICANT CHANGE UP (ref 3.8–10.5)
WBC # FLD AUTO: 7.07 K/UL — SIGNIFICANT CHANGE UP (ref 3.8–10.5)
WBC # FLD AUTO: 7.08 K/UL — SIGNIFICANT CHANGE UP (ref 3.8–10.5)
WBC # FLD AUTO: 7.09 K/UL — SIGNIFICANT CHANGE UP (ref 3.8–10.5)
WBC # FLD AUTO: 7.15 K/UL — SIGNIFICANT CHANGE UP (ref 3.8–10.5)
WBC # FLD AUTO: 7.22 K/UL — SIGNIFICANT CHANGE UP (ref 3.8–10.5)
WBC # FLD AUTO: 7.25 K/UL — SIGNIFICANT CHANGE UP (ref 3.8–10.5)
WBC # FLD AUTO: 7.27 K/UL — SIGNIFICANT CHANGE UP (ref 3.8–10.5)
WBC # FLD AUTO: 7.35 K/UL — SIGNIFICANT CHANGE UP (ref 3.8–10.5)
WBC # FLD AUTO: 7.43 K/UL — SIGNIFICANT CHANGE UP (ref 3.8–10.5)
WBC # FLD AUTO: 7.44 K/UL — SIGNIFICANT CHANGE UP (ref 3.8–10.5)
WBC # FLD AUTO: 7.5 K/UL — SIGNIFICANT CHANGE UP (ref 3.8–10.5)
WBC # FLD AUTO: 7.51 K/UL — SIGNIFICANT CHANGE UP (ref 3.8–10.5)
WBC # FLD AUTO: 7.55 K/UL — SIGNIFICANT CHANGE UP (ref 3.8–10.5)
WBC # FLD AUTO: 7.6 K/UL — SIGNIFICANT CHANGE UP (ref 3.8–10.5)
WBC # FLD AUTO: 7.63 K/UL — SIGNIFICANT CHANGE UP (ref 3.8–10.5)
WBC # FLD AUTO: 7.67 K/UL — SIGNIFICANT CHANGE UP (ref 3.8–10.5)
WBC # FLD AUTO: 7.74 K/UL — SIGNIFICANT CHANGE UP (ref 3.8–10.5)
WBC # FLD AUTO: 7.76 K/UL — SIGNIFICANT CHANGE UP (ref 3.8–10.5)
WBC # FLD AUTO: 7.81 K/UL — SIGNIFICANT CHANGE UP (ref 3.8–10.5)
WBC # FLD AUTO: 7.81 K/UL — SIGNIFICANT CHANGE UP (ref 3.8–10.5)
WBC # FLD AUTO: 7.88 K/UL — SIGNIFICANT CHANGE UP (ref 3.8–10.5)
WBC # FLD AUTO: 7.91 K/UL — SIGNIFICANT CHANGE UP (ref 3.8–10.5)
WBC # FLD AUTO: 7.99 K/UL — SIGNIFICANT CHANGE UP (ref 3.8–10.5)
WBC # FLD AUTO: 8.37 K/UL — SIGNIFICANT CHANGE UP (ref 3.8–10.5)
WBC # FLD AUTO: 8.37 K/UL — SIGNIFICANT CHANGE UP (ref 3.8–10.5)
WBC # FLD AUTO: 8.4 K/UL — SIGNIFICANT CHANGE UP (ref 3.8–10.5)
WBC # FLD AUTO: 8.41 K/UL — SIGNIFICANT CHANGE UP (ref 3.8–10.5)
WBC # FLD AUTO: 8.42 K/UL — SIGNIFICANT CHANGE UP (ref 3.8–10.5)
WBC # FLD AUTO: 8.52 K/UL — SIGNIFICANT CHANGE UP (ref 3.8–10.5)
WBC # FLD AUTO: 8.62 K/UL — SIGNIFICANT CHANGE UP (ref 3.8–10.5)
WBC # FLD AUTO: 8.7 K/UL — SIGNIFICANT CHANGE UP (ref 3.8–10.5)
WBC # FLD AUTO: 8.76 K/UL — SIGNIFICANT CHANGE UP (ref 3.8–10.5)
WBC # FLD AUTO: 8.81 K/UL — SIGNIFICANT CHANGE UP (ref 3.8–10.5)
WBC # FLD AUTO: 8.82 K/UL — SIGNIFICANT CHANGE UP (ref 3.8–10.5)
WBC # FLD AUTO: 8.86 K/UL — SIGNIFICANT CHANGE UP (ref 3.8–10.5)
WBC # FLD AUTO: 9.01 K/UL — SIGNIFICANT CHANGE UP (ref 3.8–10.5)
WBC # FLD AUTO: 9.13 K/UL — SIGNIFICANT CHANGE UP (ref 3.8–10.5)
WBC # FLD AUTO: 9.24 K/UL — SIGNIFICANT CHANGE UP (ref 3.8–10.5)
WBC # FLD AUTO: 9.27 K/UL — SIGNIFICANT CHANGE UP (ref 3.8–10.5)
WBC # FLD AUTO: 9.36 K/UL — SIGNIFICANT CHANGE UP (ref 3.8–10.5)
WBC # FLD AUTO: 9.4 K/UL — SIGNIFICANT CHANGE UP (ref 3.8–10.5)
WBC # FLD AUTO: 9.41 K/UL — SIGNIFICANT CHANGE UP (ref 3.8–10.5)
WBC # FLD AUTO: 9.46 K/UL — SIGNIFICANT CHANGE UP (ref 3.8–10.5)
WBC # FLD AUTO: 9.57 K/UL — SIGNIFICANT CHANGE UP (ref 3.8–10.5)
WBC # FLD AUTO: 9.62 K/UL — SIGNIFICANT CHANGE UP (ref 3.8–10.5)
WBC # FLD AUTO: 9.74 K/UL — SIGNIFICANT CHANGE UP (ref 3.8–10.5)
WBC # FLD AUTO: 9.84 K/UL — SIGNIFICANT CHANGE UP (ref 3.8–10.5)
WBC UR QL: ABNORMAL /HPF (ref 0–5)
WBC UR QL: SIGNIFICANT CHANGE UP /HPF (ref 0–5)
WBC UR QL: SIGNIFICANT CHANGE UP /HPF (ref 0–5)

## 2020-01-01 PROCEDURE — 85730 THROMBOPLASTIN TIME PARTIAL: CPT

## 2020-01-01 PROCEDURE — 86695 HERPES SIMPLEX TYPE 1 TEST: CPT

## 2020-01-01 PROCEDURE — 86664 EPSTEIN-BARR NUCLEAR ANTIGEN: CPT

## 2020-01-01 PROCEDURE — 83540 ASSAY OF IRON: CPT

## 2020-01-01 PROCEDURE — 71045 X-RAY EXAM CHEST 1 VIEW: CPT | Mod: 26,77

## 2020-01-01 PROCEDURE — 99233 SBSQ HOSP IP/OBS HIGH 50: CPT

## 2020-01-01 PROCEDURE — 99231 SBSQ HOSP IP/OBS SF/LOW 25: CPT

## 2020-01-01 PROCEDURE — 85379 FIBRIN DEGRADATION QUANT: CPT

## 2020-01-01 PROCEDURE — 83735 ASSAY OF MAGNESIUM: CPT

## 2020-01-01 PROCEDURE — 71045 X-RAY EXAM CHEST 1 VIEW: CPT | Mod: 26

## 2020-01-01 PROCEDURE — 99497 ADVNCD CARE PLAN 30 MIN: CPT | Mod: 25

## 2020-01-01 PROCEDURE — 82607 VITAMIN B-12: CPT

## 2020-01-01 PROCEDURE — 74176 CT ABD & PELVIS W/O CONTRAST: CPT

## 2020-01-01 PROCEDURE — 70450 CT HEAD/BRAIN W/O DYE: CPT

## 2020-01-01 PROCEDURE — 87186 SC STD MICRODIL/AGAR DIL: CPT

## 2020-01-01 PROCEDURE — 84466 ASSAY OF TRANSFERRIN: CPT

## 2020-01-01 PROCEDURE — 84484 ASSAY OF TROPONIN QUANT: CPT

## 2020-01-01 PROCEDURE — 84100 ASSAY OF PHOSPHORUS: CPT

## 2020-01-01 PROCEDURE — 87046 STOOL CULTR AEROBIC BACT EA: CPT

## 2020-01-01 PROCEDURE — 97162 PT EVAL MOD COMPLEX 30 MIN: CPT

## 2020-01-01 PROCEDURE — 84165 PROTEIN E-PHORESIS SERUM: CPT

## 2020-01-01 PROCEDURE — 97116 GAIT TRAINING THERAPY: CPT

## 2020-01-01 PROCEDURE — 93306 TTE W/DOPPLER COMPLETE: CPT | Mod: 26

## 2020-01-01 PROCEDURE — 93320 DOPPLER ECHO COMPLETE: CPT | Mod: 26

## 2020-01-01 PROCEDURE — 80053 COMPREHEN METABOLIC PANEL: CPT

## 2020-01-01 PROCEDURE — 93312 ECHO TRANSESOPHAGEAL: CPT

## 2020-01-01 PROCEDURE — 74177 CT ABD & PELVIS W/CONTRAST: CPT | Mod: 26

## 2020-01-01 PROCEDURE — 83036 HEMOGLOBIN GLYCOSYLATED A1C: CPT

## 2020-01-01 PROCEDURE — 87086 URINE CULTURE/COLONY COUNT: CPT

## 2020-01-01 PROCEDURE — 85384 FIBRINOGEN ACTIVITY: CPT

## 2020-01-01 PROCEDURE — 86900 BLOOD TYPING SEROLOGIC ABO: CPT

## 2020-01-01 PROCEDURE — 99291 CRITICAL CARE FIRST HOUR: CPT

## 2020-01-01 PROCEDURE — 36430 TRANSFUSION BLD/BLD COMPNT: CPT

## 2020-01-01 PROCEDURE — 86038 ANTINUCLEAR ANTIBODIES: CPT

## 2020-01-01 PROCEDURE — 87640 STAPH A DNA AMP PROBE: CPT

## 2020-01-01 PROCEDURE — U0003: CPT

## 2020-01-01 PROCEDURE — 72195 MRI PELVIS W/O DYE: CPT | Mod: 26

## 2020-01-01 PROCEDURE — 80061 LIPID PANEL: CPT

## 2020-01-01 PROCEDURE — 82552 ASSAY OF CPK IN BLOOD: CPT

## 2020-01-01 PROCEDURE — 82570 ASSAY OF URINE CREATININE: CPT

## 2020-01-01 PROCEDURE — 87040 BLOOD CULTURE FOR BACTERIA: CPT

## 2020-01-01 PROCEDURE — 82140 ASSAY OF AMMONIA: CPT

## 2020-01-01 PROCEDURE — C1751: CPT

## 2020-01-01 PROCEDURE — 85610 PROTHROMBIN TIME: CPT

## 2020-01-01 PROCEDURE — 74018 RADEX ABDOMEN 1 VIEW: CPT

## 2020-01-01 PROCEDURE — 84478 ASSAY OF TRIGLYCERIDES: CPT

## 2020-01-01 PROCEDURE — 87493 C DIFF AMPLIFIED PROBE: CPT

## 2020-01-01 PROCEDURE — 85652 RBC SED RATE AUTOMATED: CPT

## 2020-01-01 PROCEDURE — 83935 ASSAY OF URINE OSMOLALITY: CPT

## 2020-01-01 PROCEDURE — 85025 COMPLETE CBC W/AUTO DIFF WBC: CPT

## 2020-01-01 PROCEDURE — 86850 RBC ANTIBODY SCREEN: CPT

## 2020-01-01 PROCEDURE — 71045 X-RAY EXAM CHEST 1 VIEW: CPT

## 2020-01-01 PROCEDURE — P9040: CPT

## 2020-01-01 PROCEDURE — 93320 DOPPLER ECHO COMPLETE: CPT

## 2020-01-01 PROCEDURE — 83520 IMMUNOASSAY QUANT NOS NONAB: CPT

## 2020-01-01 PROCEDURE — 99232 SBSQ HOSP IP/OBS MODERATE 35: CPT

## 2020-01-01 PROCEDURE — 74018 RADEX ABDOMEN 1 VIEW: CPT | Mod: 26

## 2020-01-01 PROCEDURE — 11043 DBRDMT MUSC&/FSCA 1ST 20/<: CPT

## 2020-01-01 PROCEDURE — 86381 MITOCHONDRIAL ANTIBODY EACH: CPT

## 2020-01-01 PROCEDURE — 84443 ASSAY THYROID STIM HORMONE: CPT

## 2020-01-01 PROCEDURE — 93325 DOPPLER ECHO COLOR FLOW MAPG: CPT | Mod: 26

## 2020-01-01 PROCEDURE — 99223 1ST HOSP IP/OBS HIGH 75: CPT

## 2020-01-01 PROCEDURE — 86663 EPSTEIN-BARR ANTIBODY: CPT

## 2020-01-01 PROCEDURE — 84133 ASSAY OF URINE POTASSIUM: CPT

## 2020-01-01 PROCEDURE — 93010 ELECTROCARDIOGRAM REPORT: CPT

## 2020-01-01 PROCEDURE — 82962 GLUCOSE BLOOD TEST: CPT

## 2020-01-01 PROCEDURE — 87150 DNA/RNA AMPLIFIED PROBE: CPT

## 2020-01-01 PROCEDURE — 76937 US GUIDE VASCULAR ACCESS: CPT

## 2020-01-01 PROCEDURE — 82306 VITAMIN D 25 HYDROXY: CPT

## 2020-01-01 PROCEDURE — 81001 URINALYSIS AUTO W/SCOPE: CPT

## 2020-01-01 PROCEDURE — 85670 THROMBIN TIME PLASMA: CPT

## 2020-01-01 PROCEDURE — 97163 PT EVAL HIGH COMPLEX 45 MIN: CPT

## 2020-01-01 PROCEDURE — 88304 TISSUE EXAM BY PATHOLOGIST: CPT | Mod: 26

## 2020-01-01 PROCEDURE — 86923 COMPATIBILITY TEST ELECTRIC: CPT

## 2020-01-01 PROCEDURE — 82533 TOTAL CORTISOL: CPT

## 2020-01-01 PROCEDURE — 97530 THERAPEUTIC ACTIVITIES: CPT

## 2020-01-01 PROCEDURE — 80048 BASIC METABOLIC PNL TOTAL CA: CPT

## 2020-01-01 PROCEDURE — 71260 CT THORAX DX C+: CPT | Mod: 26

## 2020-01-01 PROCEDURE — 80076 HEPATIC FUNCTION PANEL: CPT

## 2020-01-01 PROCEDURE — 93970 EXTREMITY STUDY: CPT | Mod: 26

## 2020-01-01 PROCEDURE — 36415 COLL VENOUS BLD VENIPUNCTURE: CPT

## 2020-01-01 PROCEDURE — 80074 ACUTE HEPATITIS PANEL: CPT

## 2020-01-01 PROCEDURE — 83550 IRON BINDING TEST: CPT

## 2020-01-01 PROCEDURE — 99285 EMERGENCY DEPT VISIT HI MDM: CPT

## 2020-01-01 PROCEDURE — 82248 BILIRUBIN DIRECT: CPT

## 2020-01-01 PROCEDURE — G9005: CPT

## 2020-01-01 PROCEDURE — 84155 ASSAY OF PROTEIN SERUM: CPT

## 2020-01-01 PROCEDURE — 82803 BLOOD GASES ANY COMBINATION: CPT

## 2020-01-01 PROCEDURE — P9047: CPT

## 2020-01-01 PROCEDURE — 82746 ASSAY OF FOLIC ACID SERUM: CPT

## 2020-01-01 PROCEDURE — 86036 ANCA SCREEN EACH ANTIBODY: CPT

## 2020-01-01 PROCEDURE — 86376 MICROSOMAL ANTIBODY EACH: CPT

## 2020-01-01 PROCEDURE — 49083 ABD PARACENTESIS W/IMAGING: CPT

## 2020-01-01 PROCEDURE — 86147 CARDIOLIPIN ANTIBODY EA IG: CPT

## 2020-01-01 PROCEDURE — 86706 HEP B SURFACE ANTIBODY: CPT

## 2020-01-01 PROCEDURE — 93970 EXTREMITY STUDY: CPT

## 2020-01-01 PROCEDURE — 86160 COMPLEMENT ANTIGEN: CPT

## 2020-01-01 PROCEDURE — 82378 CARCINOEMBRYONIC ANTIGEN: CPT

## 2020-01-01 PROCEDURE — 82550 ASSAY OF CK (CPK): CPT

## 2020-01-01 PROCEDURE — 97110 THERAPEUTIC EXERCISES: CPT

## 2020-01-01 PROCEDURE — 96360 HYDRATION IV INFUSION INIT: CPT

## 2020-01-01 PROCEDURE — 86665 EPSTEIN-BARR CAPSID VCA: CPT

## 2020-01-01 PROCEDURE — 83615 LACTATE (LD) (LDH) ENZYME: CPT

## 2020-01-01 PROCEDURE — 94002 VENT MGMT INPAT INIT DAY: CPT

## 2020-01-01 PROCEDURE — C1887: CPT

## 2020-01-01 PROCEDURE — 94640 AIRWAY INHALATION TREATMENT: CPT

## 2020-01-01 PROCEDURE — 99222 1ST HOSP IP/OBS MODERATE 55: CPT

## 2020-01-01 PROCEDURE — 93005 ELECTROCARDIOGRAM TRACING: CPT

## 2020-01-01 PROCEDURE — 82272 OCCULT BLD FECES 1-3 TESTS: CPT

## 2020-01-01 PROCEDURE — 83605 ASSAY OF LACTIC ACID: CPT

## 2020-01-01 PROCEDURE — P9059: CPT

## 2020-01-01 PROCEDURE — 93312 ECHO TRANSESOPHAGEAL: CPT | Mod: 26

## 2020-01-01 PROCEDURE — 84145 PROCALCITONIN (PCT): CPT

## 2020-01-01 PROCEDURE — 87635 SARS-COV-2 COVID-19 AMP PRB: CPT

## 2020-01-01 PROCEDURE — 11046 DBRDMT MUSC&/FSCA EA ADDL: CPT

## 2020-01-01 PROCEDURE — 74176 CT ABD & PELVIS W/O CONTRAST: CPT | Mod: 26

## 2020-01-01 PROCEDURE — 82103 ALPHA-1-ANTITRYPSIN TOTAL: CPT

## 2020-01-01 PROCEDURE — 97164 PT RE-EVAL EST PLAN CARE: CPT

## 2020-01-01 PROCEDURE — 36573 INSJ PICC RS&I 5 YR+: CPT

## 2020-01-01 PROCEDURE — 86140 C-REACTIVE PROTEIN: CPT

## 2020-01-01 PROCEDURE — 86708 HEPATITIS A ANTIBODY: CPT

## 2020-01-01 PROCEDURE — 76705 ECHO EXAM OF ABDOMEN: CPT

## 2020-01-01 PROCEDURE — 82436 ASSAY OF URINE CHLORIDE: CPT

## 2020-01-01 PROCEDURE — 85027 COMPLETE CBC AUTOMATED: CPT

## 2020-01-01 PROCEDURE — 76705 ECHO EXAM OF ABDOMEN: CPT | Mod: 26

## 2020-01-01 PROCEDURE — 93306 TTE W/DOPPLER COMPLETE: CPT

## 2020-01-01 PROCEDURE — 72195 MRI PELVIS W/O DYE: CPT

## 2020-01-01 PROCEDURE — 71045 X-RAY EXAM CHEST 1 VIEW: CPT | Mod: 26,76

## 2020-01-01 PROCEDURE — 80202 ASSAY OF VANCOMYCIN: CPT

## 2020-01-01 PROCEDURE — 87045 FECES CULTURE AEROBIC BACT: CPT

## 2020-01-01 PROCEDURE — 86901 BLOOD TYPING SEROLOGIC RH(D): CPT

## 2020-01-01 PROCEDURE — 92610 EVALUATE SWALLOWING FUNCTION: CPT

## 2020-01-01 PROCEDURE — 88304 TISSUE EXAM BY PATHOLOGIST: CPT

## 2020-01-01 PROCEDURE — 99498 ADVNCD CARE PLAN ADDL 30 MIN: CPT | Mod: 25

## 2020-01-01 PROCEDURE — 86255 FLUORESCENT ANTIBODY SCREEN: CPT

## 2020-01-01 PROCEDURE — 87070 CULTURE OTHR SPECIMN AEROBIC: CPT

## 2020-01-01 PROCEDURE — 0225U NFCT DS DNA&RNA 21 SARSCOV2: CPT

## 2020-01-01 PROCEDURE — 76700 US EXAM ABDOM COMPLETE: CPT | Mod: 26

## 2020-01-01 PROCEDURE — 93325 DOPPLER ECHO COLOR FLOW MAPG: CPT

## 2020-01-01 PROCEDURE — C1729: CPT

## 2020-01-01 PROCEDURE — 86704 HEP B CORE ANTIBODY TOTAL: CPT

## 2020-01-01 PROCEDURE — 86334 IMMUNOFIX E-PHORESIS SERUM: CPT

## 2020-01-01 PROCEDURE — 94003 VENT MGMT INPAT SUBQ DAY: CPT

## 2020-01-01 PROCEDURE — 85611 PROTHROMBIN TEST: CPT

## 2020-01-01 PROCEDURE — 96361 HYDRATE IV INFUSION ADD-ON: CPT

## 2020-01-01 PROCEDURE — 84300 ASSAY OF URINE SODIUM: CPT

## 2020-01-01 PROCEDURE — 82728 ASSAY OF FERRITIN: CPT

## 2020-01-01 PROCEDURE — 86146 BETA-2 GLYCOPROTEIN ANTIBODY: CPT

## 2020-01-01 PROCEDURE — 83010 ASSAY OF HAPTOGLOBIN QUANT: CPT

## 2020-01-01 PROCEDURE — 87641 MR-STAPH DNA AMP PROBE: CPT

## 2020-01-01 PROCEDURE — 82784 ASSAY IGA/IGD/IGG/IGM EACH: CPT

## 2020-01-01 PROCEDURE — 86769 SARS-COV-2 COVID-19 ANTIBODY: CPT

## 2020-01-01 PROCEDURE — 92526 ORAL FUNCTION THERAPY: CPT

## 2020-01-01 PROCEDURE — 85732 THROMBOPLASTIN TIME PARTIAL: CPT

## 2020-01-01 PROCEDURE — 76700 US EXAM ABDOM COMPLETE: CPT

## 2020-01-01 PROCEDURE — 74177 CT ABD & PELVIS W/CONTRAST: CPT

## 2020-01-01 PROCEDURE — 71260 CT THORAX DX C+: CPT

## 2020-01-01 PROCEDURE — 70450 CT HEAD/BRAIN W/O DYE: CPT | Mod: 26

## 2020-01-01 PROCEDURE — 99285 EMERGENCY DEPT VISIT HI MDM: CPT | Mod: 25

## 2020-01-01 PROCEDURE — 82164 ANGIOTENSIN I ENZYME TEST: CPT

## 2020-01-01 PROCEDURE — 86022 PLATELET ANTIBODIES: CPT

## 2020-01-01 RX ORDER — SODIUM CHLORIDE 9 MG/ML
1000 INJECTION INTRAMUSCULAR; INTRAVENOUS; SUBCUTANEOUS ONCE
Refills: 0 | Status: COMPLETED | OUTPATIENT
Start: 2020-01-01 | End: 2020-01-01

## 2020-01-01 RX ORDER — PHYTONADIONE (VIT K1) 5 MG
2.5 TABLET ORAL ONCE
Refills: 0 | Status: COMPLETED | OUTPATIENT
Start: 2020-01-01 | End: 2020-01-01

## 2020-01-01 RX ORDER — RIVAROXABAN 15 MG-20MG
20 KIT ORAL
Refills: 0 | Status: DISCONTINUED | OUTPATIENT
Start: 2020-01-01 | End: 2020-01-01

## 2020-01-01 RX ORDER — ETOMIDATE 2 MG/ML
17 INJECTION INTRAVENOUS ONCE
Refills: 0 | Status: COMPLETED | OUTPATIENT
Start: 2020-01-01 | End: 2020-01-01

## 2020-01-01 RX ORDER — FUROSEMIDE 40 MG
40 TABLET ORAL EVERY 8 HOURS
Refills: 0 | Status: DISCONTINUED | OUTPATIENT
Start: 2020-01-01 | End: 2020-01-01

## 2020-01-01 RX ORDER — POTASSIUM CHLORIDE 20 MEQ
40 PACKET (EA) ORAL ONCE
Refills: 0 | Status: COMPLETED | OUTPATIENT
Start: 2020-01-01 | End: 2020-01-01

## 2020-01-01 RX ORDER — FONDAPARINUX SODIUM 2.5 MG/.5ML
1 INJECTION, SOLUTION SUBCUTANEOUS
Qty: 30 | Refills: 0
Start: 2020-01-01 | End: 2020-01-01

## 2020-01-01 RX ORDER — ASCORBIC ACID 60 MG
500 TABLET,CHEWABLE ORAL DAILY
Refills: 0 | Status: DISCONTINUED | OUTPATIENT
Start: 2020-01-01 | End: 2020-01-01

## 2020-01-01 RX ORDER — ACETAMINOPHEN 500 MG
650 TABLET ORAL EVERY 6 HOURS
Refills: 0 | Status: DISCONTINUED | OUTPATIENT
Start: 2020-01-01 | End: 2020-01-01

## 2020-01-01 RX ORDER — CHLORHEXIDINE GLUCONATE 213 G/1000ML
1 SOLUTION TOPICAL
Refills: 0 | Status: DISCONTINUED | OUTPATIENT
Start: 2020-01-01 | End: 2020-01-01

## 2020-01-01 RX ORDER — SODIUM CHLORIDE 9 MG/ML
10 INJECTION INTRAMUSCULAR; INTRAVENOUS; SUBCUTANEOUS
Refills: 0 | Status: DISCONTINUED | OUTPATIENT
Start: 2020-01-01 | End: 2020-01-01

## 2020-01-01 RX ORDER — MUPIROCIN 20 MG/G
1 OINTMENT TOPICAL
Refills: 0 | Status: COMPLETED | OUTPATIENT
Start: 2020-01-01 | End: 2020-01-01

## 2020-01-01 RX ORDER — ENOXAPARIN SODIUM 100 MG/ML
50 INJECTION SUBCUTANEOUS DAILY
Refills: 0 | Status: DISCONTINUED | OUTPATIENT
Start: 2020-01-01 | End: 2020-01-01

## 2020-01-01 RX ORDER — SODIUM CHLORIDE 9 MG/ML
1000 INJECTION INTRAMUSCULAR; INTRAVENOUS; SUBCUTANEOUS
Refills: 0 | Status: DISCONTINUED | OUTPATIENT
Start: 2020-01-01 | End: 2020-01-01

## 2020-01-01 RX ORDER — CEFEPIME 1 G/1
1000 INJECTION, POWDER, FOR SOLUTION INTRAMUSCULAR; INTRAVENOUS ONCE
Refills: 0 | Status: COMPLETED | OUTPATIENT
Start: 2020-01-01 | End: 2020-01-01

## 2020-01-01 RX ORDER — SODIUM CHLORIDE 9 MG/ML
1000 INJECTION, SOLUTION INTRAVENOUS
Refills: 0 | Status: DISCONTINUED | OUTPATIENT
Start: 2020-01-01 | End: 2020-01-01

## 2020-01-01 RX ORDER — NOREPINEPHRINE BITARTRATE/D5W 8 MG/250ML
0.05 PLASTIC BAG, INJECTION (ML) INTRAVENOUS
Qty: 16 | Refills: 0 | Status: DISCONTINUED | OUTPATIENT
Start: 2020-01-01 | End: 2020-01-01

## 2020-01-01 RX ORDER — DEXMEDETOMIDINE HYDROCHLORIDE IN 0.9% SODIUM CHLORIDE 4 UG/ML
0.2 INJECTION INTRAVENOUS
Qty: 200 | Refills: 0 | Status: DISCONTINUED | OUTPATIENT
Start: 2020-01-01 | End: 2020-01-01

## 2020-01-01 RX ORDER — FUROSEMIDE 40 MG
40 TABLET ORAL ONCE
Refills: 0 | Status: COMPLETED | OUTPATIENT
Start: 2020-01-01 | End: 2020-01-01

## 2020-01-01 RX ORDER — HALOPERIDOL DECANOATE 100 MG/ML
1 INJECTION INTRAMUSCULAR ONCE
Refills: 0 | Status: COMPLETED | OUTPATIENT
Start: 2020-01-01 | End: 2020-01-01

## 2020-01-01 RX ORDER — LINEZOLID 600 MG/300ML
1 INJECTION, SOLUTION INTRAVENOUS
Qty: 12 | Refills: 0
Start: 2020-01-01 | End: 2020-01-01

## 2020-01-01 RX ORDER — POTASSIUM CHLORIDE 20 MEQ
10 PACKET (EA) ORAL
Refills: 0 | Status: COMPLETED | OUTPATIENT
Start: 2020-01-01 | End: 2020-01-01

## 2020-01-01 RX ORDER — FUROSEMIDE 40 MG
40 TABLET ORAL
Refills: 0 | Status: DISCONTINUED | OUTPATIENT
Start: 2020-01-01 | End: 2020-01-01

## 2020-01-01 RX ORDER — SODIUM CHLORIDE 9 MG/ML
1000 INJECTION, SOLUTION INTRAVENOUS ONCE
Refills: 0 | Status: COMPLETED | OUTPATIENT
Start: 2020-01-01 | End: 2020-01-01

## 2020-01-01 RX ORDER — ENOXAPARIN SODIUM 100 MG/ML
60 INJECTION SUBCUTANEOUS EVERY 12 HOURS
Refills: 0 | Status: DISCONTINUED | OUTPATIENT
Start: 2020-01-01 | End: 2020-01-01

## 2020-01-01 RX ORDER — POTASSIUM PHOSPHATE, MONOBASIC POTASSIUM PHOSPHATE, DIBASIC 236; 224 MG/ML; MG/ML
15 INJECTION, SOLUTION INTRAVENOUS ONCE
Refills: 0 | Status: COMPLETED | OUTPATIENT
Start: 2020-01-01 | End: 2020-01-01

## 2020-01-01 RX ORDER — ALBUMIN HUMAN 25 %
50 VIAL (ML) INTRAVENOUS ONCE
Refills: 0 | Status: COMPLETED | OUTPATIENT
Start: 2020-01-01 | End: 2020-01-01

## 2020-01-01 RX ORDER — THIAMINE MONONITRATE (VIT B1) 100 MG
200 TABLET ORAL THREE TIMES A DAY
Refills: 0 | Status: COMPLETED | OUTPATIENT
Start: 2020-01-01 | End: 2020-01-01

## 2020-01-01 RX ORDER — CIPROFLOXACIN LACTATE 400MG/40ML
400 VIAL (ML) INTRAVENOUS EVERY 12 HOURS
Refills: 0 | Status: DISCONTINUED | OUTPATIENT
Start: 2020-01-01 | End: 2020-01-01

## 2020-01-01 RX ORDER — MIDODRINE HYDROCHLORIDE 2.5 MG/1
10 TABLET ORAL THREE TIMES A DAY
Refills: 0 | Status: DISCONTINUED | OUTPATIENT
Start: 2020-01-01 | End: 2020-01-01

## 2020-01-01 RX ORDER — VANCOMYCIN HCL 1 G
500 VIAL (EA) INTRAVENOUS EVERY 24 HOURS
Refills: 0 | Status: DISCONTINUED | OUTPATIENT
Start: 2020-01-01 | End: 2020-01-01

## 2020-01-01 RX ORDER — CEFEPIME 1 G/1
INJECTION, POWDER, FOR SOLUTION INTRAMUSCULAR; INTRAVENOUS
Refills: 0 | Status: DISCONTINUED | OUTPATIENT
Start: 2020-01-01 | End: 2020-01-01

## 2020-01-01 RX ORDER — SODIUM,POTASSIUM PHOSPHATES 278-250MG
1 POWDER IN PACKET (EA) ORAL
Refills: 0 | Status: COMPLETED | OUTPATIENT
Start: 2020-01-01 | End: 2020-01-01

## 2020-01-01 RX ORDER — POTASSIUM CHLORIDE 20 MEQ
40 PACKET (EA) ORAL EVERY 4 HOURS
Refills: 0 | Status: COMPLETED | OUTPATIENT
Start: 2020-01-01 | End: 2020-01-01

## 2020-01-01 RX ORDER — PANTOPRAZOLE SODIUM 20 MG/1
40 TABLET, DELAYED RELEASE ORAL
Refills: 0 | Status: DISCONTINUED | OUTPATIENT
Start: 2020-01-01 | End: 2020-01-01

## 2020-01-01 RX ORDER — POTASSIUM CHLORIDE 20 MEQ
40 PACKET (EA) ORAL
Refills: 0 | Status: COMPLETED | OUTPATIENT
Start: 2020-01-01 | End: 2020-01-01

## 2020-01-01 RX ORDER — INSULIN HUMAN 100 [IU]/ML
5 INJECTION, SOLUTION SUBCUTANEOUS ONCE
Refills: 0 | Status: DISCONTINUED | OUTPATIENT
Start: 2020-01-01 | End: 2020-01-01

## 2020-01-01 RX ORDER — DEXTROSE MONOHYDRATE, SODIUM CHLORIDE, AND POTASSIUM CHLORIDE 50; .745; 4.5 G/1000ML; G/1000ML; G/1000ML
100 INJECTION, SOLUTION INTRAVENOUS
Refills: 0 | Status: DISCONTINUED | OUTPATIENT
Start: 2020-01-01 | End: 2020-01-01

## 2020-01-01 RX ORDER — CIPROFLOXACIN LACTATE 400MG/40ML
VIAL (ML) INTRAVENOUS
Refills: 0 | Status: DISCONTINUED | OUTPATIENT
Start: 2020-01-01 | End: 2020-01-01

## 2020-01-01 RX ORDER — COLLAGENASE CLOSTRIDIUM HIST. 250 UNIT/G
1 OINTMENT (GRAM) TOPICAL
Qty: 0 | Refills: 0 | DISCHARGE
Start: 2020-01-01

## 2020-01-01 RX ORDER — FENTANYL CITRATE 50 UG/ML
1 INJECTION INTRAVENOUS
Qty: 2500 | Refills: 0 | Status: DISCONTINUED | OUTPATIENT
Start: 2020-01-01 | End: 2020-01-01

## 2020-01-01 RX ORDER — FUROSEMIDE 40 MG
1 TABLET ORAL
Qty: 0 | Refills: 0 | DISCHARGE

## 2020-01-01 RX ORDER — ALBUMIN HUMAN 25 %
50 VIAL (ML) INTRAVENOUS EVERY 8 HOURS
Refills: 0 | Status: COMPLETED | OUTPATIENT
Start: 2020-01-01 | End: 2020-01-01

## 2020-01-01 RX ORDER — RANITIDINE HYDROCHLORIDE 150 MG/1
1 TABLET, FILM COATED ORAL
Qty: 0 | Refills: 0 | DISCHARGE

## 2020-01-01 RX ORDER — FUROSEMIDE 40 MG
40 TABLET ORAL EVERY 8 HOURS
Refills: 0 | Status: COMPLETED | OUTPATIENT
Start: 2020-01-01 | End: 2020-01-01

## 2020-01-01 RX ORDER — HALOPERIDOL DECANOATE 100 MG/ML
1 INJECTION INTRAMUSCULAR ONCE
Refills: 0 | Status: DISCONTINUED | OUTPATIENT
Start: 2020-01-01 | End: 2020-01-01

## 2020-01-01 RX ORDER — ALBUTEROL 90 UG/1
2 AEROSOL, METERED ORAL
Qty: 0 | Refills: 0 | DISCHARGE
Start: 2020-01-01

## 2020-01-01 RX ORDER — FLUCONAZOLE 150 MG/1
1 TABLET ORAL
Qty: 0 | Refills: 0 | DISCHARGE
Start: 2020-01-01 | End: 2020-01-01

## 2020-01-01 RX ORDER — ACETAMINOPHEN 500 MG
650 TABLET ORAL ONCE
Refills: 0 | Status: COMPLETED | OUTPATIENT
Start: 2020-01-01 | End: 2020-01-01

## 2020-01-01 RX ORDER — POTASSIUM CHLORIDE 20 MEQ
10 PACKET (EA) ORAL ONCE
Refills: 0 | Status: COMPLETED | OUTPATIENT
Start: 2020-01-01 | End: 2020-01-01

## 2020-01-01 RX ORDER — FLUCONAZOLE 150 MG/1
200 TABLET ORAL EVERY 24 HOURS
Refills: 0 | Status: DISCONTINUED | OUTPATIENT
Start: 2020-01-01 | End: 2020-01-01

## 2020-01-01 RX ORDER — CEFTRIAXONE 500 MG/1
1000 INJECTION, POWDER, FOR SOLUTION INTRAMUSCULAR; INTRAVENOUS ONCE
Refills: 0 | Status: COMPLETED | OUTPATIENT
Start: 2020-01-01 | End: 2020-01-01

## 2020-01-01 RX ORDER — LACTULOSE 10 G/15ML
10 SOLUTION ORAL DAILY
Refills: 0 | Status: DISCONTINUED | OUTPATIENT
Start: 2020-01-01 | End: 2020-01-01

## 2020-01-01 RX ORDER — MORPHINE SULFATE 50 MG/1
4 CAPSULE, EXTENDED RELEASE ORAL ONCE
Refills: 0 | Status: DISCONTINUED | OUTPATIENT
Start: 2020-01-01 | End: 2020-01-01

## 2020-01-01 RX ORDER — CALCIUM GLUCONATE 100 MG/ML
1 VIAL (ML) INTRAVENOUS ONCE
Refills: 0 | Status: DISCONTINUED | OUTPATIENT
Start: 2020-01-01 | End: 2020-01-01

## 2020-01-01 RX ORDER — FERROUS SULFATE 325(65) MG
325 TABLET ORAL DAILY
Refills: 0 | Status: DISCONTINUED | OUTPATIENT
Start: 2020-01-01 | End: 2020-01-01

## 2020-01-01 RX ORDER — POTASSIUM CHLORIDE 20 MEQ
40 PACKET (EA) ORAL ONCE
Refills: 0 | Status: DISCONTINUED | OUTPATIENT
Start: 2020-01-01 | End: 2020-01-01

## 2020-01-01 RX ORDER — SODIUM CHLORIDE 9 MG/ML
500 INJECTION INTRAMUSCULAR; INTRAVENOUS; SUBCUTANEOUS ONCE
Refills: 0 | Status: COMPLETED | OUTPATIENT
Start: 2020-01-01 | End: 2020-01-01

## 2020-01-01 RX ORDER — DAPTOMYCIN 500 MG/10ML
INJECTION, POWDER, LYOPHILIZED, FOR SOLUTION INTRAVENOUS
Refills: 0 | Status: COMPLETED | OUTPATIENT
Start: 2020-01-01 | End: 2020-01-01

## 2020-01-01 RX ORDER — FUROSEMIDE 40 MG
80 TABLET ORAL EVERY 12 HOURS
Refills: 0 | Status: DISCONTINUED | OUTPATIENT
Start: 2020-01-01 | End: 2020-01-01

## 2020-01-01 RX ORDER — RIVAROXABAN 15 MG-20MG
15 KIT ORAL DAILY
Refills: 0 | Status: DISCONTINUED | OUTPATIENT
Start: 2020-01-01 | End: 2020-01-01

## 2020-01-01 RX ORDER — ENOXAPARIN SODIUM 100 MG/ML
60 INJECTION SUBCUTANEOUS
Refills: 0 | Status: DISCONTINUED | OUTPATIENT
Start: 2020-01-01 | End: 2020-01-01

## 2020-01-01 RX ORDER — FUROSEMIDE 40 MG
40 TABLET ORAL DAILY
Refills: 0 | Status: DISCONTINUED | OUTPATIENT
Start: 2020-01-01 | End: 2020-01-01

## 2020-01-01 RX ORDER — PHYTONADIONE (VIT K1) 5 MG
10 TABLET ORAL ONCE
Refills: 0 | Status: COMPLETED | OUTPATIENT
Start: 2020-01-01 | End: 2020-01-01

## 2020-01-01 RX ORDER — LACTULOSE 10 G/15ML
20 SOLUTION ORAL EVERY 6 HOURS
Refills: 0 | Status: DISCONTINUED | OUTPATIENT
Start: 2020-01-01 | End: 2020-01-01

## 2020-01-01 RX ORDER — MAGNESIUM SULFATE 500 MG/ML
1 VIAL (ML) INJECTION ONCE
Refills: 0 | Status: COMPLETED | OUTPATIENT
Start: 2020-01-01 | End: 2020-01-01

## 2020-01-01 RX ORDER — MUPIROCIN 20 MG/G
1 OINTMENT TOPICAL
Refills: 0 | Status: DISCONTINUED | OUTPATIENT
Start: 2020-01-01 | End: 2020-01-01

## 2020-01-01 RX ORDER — PANTOPRAZOLE SODIUM 20 MG/1
40 TABLET, DELAYED RELEASE ORAL DAILY
Refills: 0 | Status: DISCONTINUED | OUTPATIENT
Start: 2020-01-01 | End: 2020-01-01

## 2020-01-01 RX ORDER — ZINC SULFATE TAB 220 MG (50 MG ZINC EQUIVALENT) 220 (50 ZN) MG
1 TAB ORAL
Qty: 0 | Refills: 0 | DISCHARGE
Start: 2020-01-01

## 2020-01-01 RX ORDER — COLLAGENASE CLOSTRIDIUM HIST. 250 UNIT/G
1 OINTMENT (GRAM) TOPICAL DAILY
Refills: 0 | Status: DISCONTINUED | OUTPATIENT
Start: 2020-01-01 | End: 2020-01-01

## 2020-01-01 RX ORDER — LACTULOSE 10 G/15ML
30 SOLUTION ORAL EVERY 8 HOURS
Refills: 0 | Status: DISCONTINUED | OUTPATIENT
Start: 2020-01-01 | End: 2020-01-01

## 2020-01-01 RX ORDER — VANCOMYCIN HCL 1 G
750 VIAL (EA) INTRAVENOUS ONCE
Refills: 0 | Status: COMPLETED | OUTPATIENT
Start: 2020-01-01 | End: 2020-01-01

## 2020-01-01 RX ORDER — ALBUMIN HUMAN 25 %
100 VIAL (ML) INTRAVENOUS EVERY 6 HOURS
Refills: 0 | Status: COMPLETED | OUTPATIENT
Start: 2020-01-01 | End: 2020-01-01

## 2020-01-01 RX ORDER — ALBUTEROL 90 UG/1
2 AEROSOL, METERED ORAL EVERY 6 HOURS
Refills: 0 | Status: DISCONTINUED | OUTPATIENT
Start: 2020-01-01 | End: 2020-01-01

## 2020-01-01 RX ORDER — ACETAMINOPHEN 500 MG
1000 TABLET ORAL ONCE
Refills: 0 | Status: COMPLETED | OUTPATIENT
Start: 2020-01-01 | End: 2020-01-01

## 2020-01-01 RX ORDER — FUROSEMIDE 40 MG
20 TABLET ORAL DAILY
Refills: 0 | Status: DISCONTINUED | OUTPATIENT
Start: 2020-01-01 | End: 2020-01-01

## 2020-01-01 RX ORDER — CIPROFLOXACIN LACTATE 400MG/40ML
400 VIAL (ML) INTRAVENOUS ONCE
Refills: 0 | Status: COMPLETED | OUTPATIENT
Start: 2020-01-01 | End: 2020-01-01

## 2020-01-01 RX ORDER — SENNA PLUS 8.6 MG/1
2 TABLET ORAL AT BEDTIME
Refills: 0 | Status: DISCONTINUED | OUTPATIENT
Start: 2020-01-01 | End: 2020-01-01

## 2020-01-01 RX ORDER — PROPOFOL 10 MG/ML
10 INJECTION, EMULSION INTRAVENOUS
Qty: 1000 | Refills: 0 | Status: DISCONTINUED | OUTPATIENT
Start: 2020-01-01 | End: 2020-01-01

## 2020-01-01 RX ORDER — POTASSIUM CHLORIDE 20 MEQ
40 PACKET (EA) ORAL
Refills: 0 | Status: DISCONTINUED | OUTPATIENT
Start: 2020-01-01 | End: 2020-01-01

## 2020-01-01 RX ORDER — NYSTATIN CREAM 100000 [USP'U]/G
1 CREAM TOPICAL
Refills: 0 | Status: DISCONTINUED | OUTPATIENT
Start: 2020-01-01 | End: 2020-01-01

## 2020-01-01 RX ORDER — FENTANYL CITRATE 50 UG/ML
0.5 INJECTION INTRAVENOUS
Qty: 2500 | Refills: 0 | Status: DISCONTINUED | OUTPATIENT
Start: 2020-01-01 | End: 2020-01-01

## 2020-01-01 RX ORDER — DEXTROSE MONOHYDRATE, SODIUM CHLORIDE, AND POTASSIUM CHLORIDE 50; .745; 4.5 G/1000ML; G/1000ML; G/1000ML
100 INJECTION, SOLUTION INTRAVENOUS
Refills: 0 | Status: COMPLETED | OUTPATIENT
Start: 2020-01-01 | End: 2020-01-01

## 2020-01-01 RX ORDER — MAGNESIUM SULFATE 500 MG/ML
2 VIAL (ML) INJECTION ONCE
Refills: 0 | Status: COMPLETED | OUTPATIENT
Start: 2020-01-01 | End: 2020-01-01

## 2020-01-01 RX ORDER — POTASSIUM PHOSPHATE, MONOBASIC POTASSIUM PHOSPHATE, DIBASIC 236; 224 MG/ML; MG/ML
15 INJECTION, SOLUTION INTRAVENOUS ONCE
Refills: 0 | Status: DISCONTINUED | OUTPATIENT
Start: 2020-01-01 | End: 2020-01-01

## 2020-01-01 RX ORDER — PHYTONADIONE (VIT K1) 5 MG
5 TABLET ORAL ONCE
Refills: 0 | Status: COMPLETED | OUTPATIENT
Start: 2020-01-01 | End: 2020-01-01

## 2020-01-01 RX ORDER — ONDANSETRON 8 MG/1
4 TABLET, FILM COATED ORAL ONCE
Refills: 0 | Status: COMPLETED | OUTPATIENT
Start: 2020-01-01 | End: 2020-01-01

## 2020-01-01 RX ORDER — LACTULOSE 10 G/15ML
40 SOLUTION ORAL EVERY 4 HOURS
Refills: 0 | Status: DISCONTINUED | OUTPATIENT
Start: 2020-01-01 | End: 2020-01-01

## 2020-01-01 RX ORDER — ALBUMIN HUMAN 25 %
50 VIAL (ML) INTRAVENOUS EVERY 6 HOURS
Refills: 0 | Status: COMPLETED | OUTPATIENT
Start: 2020-01-01 | End: 2020-01-01

## 2020-01-01 RX ORDER — FONDAPARINUX SODIUM 2.5 MG/.5ML
1 INJECTION, SOLUTION SUBCUTANEOUS
Qty: 0 | Refills: 0 | DISCHARGE

## 2020-01-01 RX ORDER — DAPTOMYCIN 500 MG/10ML
350 INJECTION, POWDER, LYOPHILIZED, FOR SOLUTION INTRAVENOUS ONCE
Refills: 0 | Status: COMPLETED | OUTPATIENT
Start: 2020-01-01 | End: 2020-01-01

## 2020-01-01 RX ORDER — FUROSEMIDE 40 MG
120 TABLET ORAL ONCE
Refills: 0 | Status: COMPLETED | OUTPATIENT
Start: 2020-01-01 | End: 2020-01-01

## 2020-01-01 RX ORDER — CALCIUM CHLORIDE
1000 POWDER (GRAM) MISCELLANEOUS ONCE
Refills: 0 | Status: COMPLETED | OUTPATIENT
Start: 2020-01-01 | End: 2020-01-01

## 2020-01-01 RX ORDER — FUROSEMIDE 40 MG
1 TABLET ORAL
Qty: 0 | Refills: 0 | DISCHARGE
Start: 2020-01-01

## 2020-01-01 RX ORDER — ALBUMIN HUMAN 25 %
100 VIAL (ML) INTRAVENOUS EVERY 6 HOURS
Refills: 0 | Status: DISCONTINUED | OUTPATIENT
Start: 2020-01-01 | End: 2020-01-01

## 2020-01-01 RX ORDER — ASCORBIC ACID 60 MG
1 TABLET,CHEWABLE ORAL
Qty: 0 | Refills: 0 | DISCHARGE
Start: 2020-01-01

## 2020-01-01 RX ORDER — LINEZOLID 600 MG/300ML
1 INJECTION, SOLUTION INTRAVENOUS
Qty: 20 | Refills: 0
Start: 2020-01-01 | End: 2020-01-01

## 2020-01-01 RX ORDER — SODIUM ZIRCONIUM CYCLOSILICATE 10 G/10G
5 POWDER, FOR SUSPENSION ORAL ONCE
Refills: 0 | Status: DISCONTINUED | OUTPATIENT
Start: 2020-01-01 | End: 2020-01-01

## 2020-01-01 RX ORDER — DEXTROSE 50 % IN WATER 50 %
50 SYRINGE (ML) INTRAVENOUS ONCE
Refills: 0 | Status: DISCONTINUED | OUTPATIENT
Start: 2020-01-01 | End: 2020-01-01

## 2020-01-01 RX ORDER — DEXTROSE 50 % IN WATER 50 %
50 SYRINGE (ML) INTRAVENOUS ONCE
Refills: 0 | Status: COMPLETED | OUTPATIENT
Start: 2020-01-01 | End: 2020-01-01

## 2020-01-01 RX ORDER — CALCIUM GLUCONATE 100 MG/ML
1 VIAL (ML) INTRAVENOUS ONCE
Refills: 0 | Status: COMPLETED | OUTPATIENT
Start: 2020-01-01 | End: 2020-01-01

## 2020-01-01 RX ORDER — DEXTROSE 50 % IN WATER 50 %
25 SYRINGE (ML) INTRAVENOUS ONCE
Refills: 0 | Status: DISCONTINUED | OUTPATIENT
Start: 2020-01-01 | End: 2020-01-01

## 2020-01-01 RX ORDER — CHLORHEXIDINE GLUCONATE 213 G/1000ML
1 SOLUTION TOPICAL DAILY
Refills: 0 | Status: DISCONTINUED | OUTPATIENT
Start: 2020-01-01 | End: 2020-01-01

## 2020-01-01 RX ORDER — CALCIUM GLUCONATE 100 MG/ML
2 VIAL (ML) INTRAVENOUS ONCE
Refills: 0 | Status: DISCONTINUED | OUTPATIENT
Start: 2020-01-01 | End: 2020-01-01

## 2020-01-01 RX ORDER — THIAMINE MONONITRATE (VIT B1) 100 MG
200 TABLET ORAL THREE TIMES A DAY
Refills: 0 | Status: DISCONTINUED | OUTPATIENT
Start: 2020-01-01 | End: 2020-01-01

## 2020-01-01 RX ORDER — MULTIVIT-MIN/FERROUS GLUCONATE 9 MG/15 ML
1 LIQUID (ML) ORAL
Qty: 0 | Refills: 0 | DISCHARGE
Start: 2020-01-01

## 2020-01-01 RX ORDER — POTASSIUM CHLORIDE 20 MEQ
20 PACKET (EA) ORAL ONCE
Refills: 0 | Status: COMPLETED | OUTPATIENT
Start: 2020-01-01 | End: 2020-01-01

## 2020-01-01 RX ORDER — VANCOMYCIN HCL 1 G
500 VIAL (EA) INTRAVENOUS EVERY 12 HOURS
Refills: 0 | Status: DISCONTINUED | OUTPATIENT
Start: 2020-01-01 | End: 2020-01-01

## 2020-01-01 RX ORDER — LACTULOSE 10 G/15ML
20 SOLUTION ORAL EVERY 4 HOURS
Refills: 0 | Status: DISCONTINUED | OUTPATIENT
Start: 2020-01-01 | End: 2020-01-01

## 2020-01-01 RX ORDER — MIRTAZAPINE 45 MG/1
1 TABLET, ORALLY DISINTEGRATING ORAL
Qty: 0 | Refills: 0 | DISCHARGE

## 2020-01-01 RX ORDER — FUROSEMIDE 40 MG
80 TABLET ORAL ONCE
Refills: 0 | Status: COMPLETED | OUTPATIENT
Start: 2020-01-01 | End: 2020-01-01

## 2020-01-01 RX ORDER — CHOLECALCIFEROL (VITAMIN D3) 125 MCG
1 CAPSULE ORAL
Qty: 0 | Refills: 0 | DISCHARGE

## 2020-01-01 RX ORDER — POTASSIUM PHOSPHATE, MONOBASIC POTASSIUM PHOSPHATE, DIBASIC 236; 224 MG/ML; MG/ML
30 INJECTION, SOLUTION INTRAVENOUS ONCE
Refills: 0 | Status: COMPLETED | OUTPATIENT
Start: 2020-01-01 | End: 2020-01-01

## 2020-01-01 RX ORDER — METRONIDAZOLE 500 MG
500 TABLET ORAL EVERY 8 HOURS
Refills: 0 | Status: DISCONTINUED | OUTPATIENT
Start: 2020-01-01 | End: 2020-01-01

## 2020-01-01 RX ORDER — HEPARIN SODIUM 5000 [USP'U]/ML
5000 INJECTION INTRAVENOUS; SUBCUTANEOUS EVERY 12 HOURS
Refills: 0 | Status: DISCONTINUED | OUTPATIENT
Start: 2020-01-01 | End: 2020-01-01

## 2020-01-01 RX ORDER — VANCOMYCIN HCL 1 G
VIAL (EA) INTRAVENOUS
Refills: 0 | Status: DISCONTINUED | OUTPATIENT
Start: 2020-01-01 | End: 2020-01-01

## 2020-01-01 RX ORDER — LACTULOSE 10 G/15ML
30 SOLUTION ORAL EVERY 6 HOURS
Refills: 0 | Status: DISCONTINUED | OUTPATIENT
Start: 2020-01-01 | End: 2020-01-01

## 2020-01-01 RX ORDER — ALBUMIN HUMAN 25 %
50 VIAL (ML) INTRAVENOUS EVERY 6 HOURS
Refills: 0 | Status: DISCONTINUED | OUTPATIENT
Start: 2020-01-01 | End: 2020-01-01

## 2020-01-01 RX ORDER — FUROSEMIDE 40 MG
40 TABLET ORAL EVERY 12 HOURS
Refills: 0 | Status: DISCONTINUED | OUTPATIENT
Start: 2020-01-01 | End: 2020-01-01

## 2020-01-01 RX ORDER — SODIUM BICARBONATE 1 MEQ/ML
50 SYRINGE (ML) INTRAVENOUS ONCE
Refills: 0 | Status: COMPLETED | OUTPATIENT
Start: 2020-01-01 | End: 2020-01-01

## 2020-01-01 RX ORDER — FUROSEMIDE 40 MG
80 TABLET ORAL ONCE
Refills: 0 | Status: DISCONTINUED | OUTPATIENT
Start: 2020-01-01 | End: 2020-01-01

## 2020-01-01 RX ORDER — ZINC SULFATE TAB 220 MG (50 MG ZINC EQUIVALENT) 220 (50 ZN) MG
220 TAB ORAL DAILY
Refills: 0 | Status: DISCONTINUED | OUTPATIENT
Start: 2020-01-01 | End: 2020-01-01

## 2020-01-01 RX ORDER — MULTIVIT-MIN/FERROUS GLUCONATE 9 MG/15 ML
1 LIQUID (ML) ORAL DAILY
Refills: 0 | Status: DISCONTINUED | OUTPATIENT
Start: 2020-01-01 | End: 2020-01-01

## 2020-01-01 RX ORDER — ASCORBIC ACID 60 MG
500 TABLET,CHEWABLE ORAL
Refills: 0 | Status: DISCONTINUED | OUTPATIENT
Start: 2020-01-01 | End: 2020-01-01

## 2020-01-01 RX ORDER — INSULIN LISPRO 100/ML
VIAL (ML) SUBCUTANEOUS AT BEDTIME
Refills: 0 | Status: DISCONTINUED | OUTPATIENT
Start: 2020-01-01 | End: 2020-01-01

## 2020-01-01 RX ORDER — DEXTROSE MONOHYDRATE, SODIUM CHLORIDE, AND POTASSIUM CHLORIDE 50; .745; 4.5 G/1000ML; G/1000ML; G/1000ML
250 INJECTION, SOLUTION INTRAVENOUS
Refills: 0 | Status: DISCONTINUED | OUTPATIENT
Start: 2020-01-01 | End: 2020-01-01

## 2020-01-01 RX ORDER — LACTULOSE 10 G/15ML
200 SOLUTION ORAL ONCE
Refills: 0 | Status: COMPLETED | OUTPATIENT
Start: 2020-01-01 | End: 2020-01-01

## 2020-01-01 RX ORDER — LACTOBACILLUS ACIDOPHILUS 100MM CELL
1 CAPSULE ORAL DAILY
Refills: 0 | Status: DISCONTINUED | OUTPATIENT
Start: 2020-01-01 | End: 2020-01-01

## 2020-01-01 RX ORDER — METRONIDAZOLE 500 MG
500 TABLET ORAL ONCE
Refills: 0 | Status: COMPLETED | OUTPATIENT
Start: 2020-01-01 | End: 2020-01-01

## 2020-01-01 RX ORDER — SPIRONOLACTONE 25 MG/1
25 TABLET, FILM COATED ORAL DAILY
Refills: 0 | Status: DISCONTINUED | OUTPATIENT
Start: 2020-01-01 | End: 2020-01-01

## 2020-01-01 RX ORDER — FOLIC ACID 0.8 MG
1 TABLET ORAL
Qty: 0 | Refills: 0 | DISCHARGE

## 2020-01-01 RX ORDER — DAPTOMYCIN 500 MG/10ML
350 INJECTION, POWDER, LYOPHILIZED, FOR SOLUTION INTRAVENOUS EVERY 24 HOURS
Refills: 0 | Status: COMPLETED | OUTPATIENT
Start: 2020-01-01 | End: 2020-01-01

## 2020-01-01 RX ORDER — VANCOMYCIN HCL 1 G
500 VIAL (EA) INTRAVENOUS ONCE
Refills: 0 | Status: COMPLETED | OUTPATIENT
Start: 2020-01-01 | End: 2020-01-01

## 2020-01-01 RX ORDER — FERROUS SULFATE 325(65) MG
1 TABLET ORAL
Qty: 0 | Refills: 0 | DISCHARGE

## 2020-01-01 RX ORDER — FLUCONAZOLE 150 MG/1
200 TABLET ORAL DAILY
Refills: 0 | Status: DISCONTINUED | OUTPATIENT
Start: 2020-01-01 | End: 2020-01-01

## 2020-01-01 RX ORDER — SENNA PLUS 8.6 MG/1
2 TABLET ORAL
Qty: 0 | Refills: 0 | DISCHARGE
Start: 2020-01-01

## 2020-01-01 RX ORDER — CEFTRIAXONE 500 MG/1
INJECTION, POWDER, FOR SOLUTION INTRAMUSCULAR; INTRAVENOUS
Refills: 0 | Status: DISCONTINUED | OUTPATIENT
Start: 2020-01-01 | End: 2020-01-01

## 2020-01-01 RX ORDER — HEPARIN SODIUM 5000 [USP'U]/ML
5000 INJECTION INTRAVENOUS; SUBCUTANEOUS EVERY 8 HOURS
Refills: 0 | Status: DISCONTINUED | OUTPATIENT
Start: 2020-01-01 | End: 2020-01-01

## 2020-01-01 RX ORDER — FUROSEMIDE 40 MG
40 TABLET ORAL EVERY 6 HOURS
Refills: 0 | Status: DISCONTINUED | OUTPATIENT
Start: 2020-01-01 | End: 2020-01-01

## 2020-01-01 RX ORDER — LINEZOLID 600 MG/300ML
INJECTION, SOLUTION INTRAVENOUS
Refills: 0 | Status: DISCONTINUED | OUTPATIENT
Start: 2020-01-01 | End: 2020-01-01

## 2020-01-01 RX ORDER — FUROSEMIDE 40 MG
40 TABLET ORAL ONCE
Refills: 0 | Status: DISCONTINUED | OUTPATIENT
Start: 2020-01-01 | End: 2020-01-01

## 2020-01-01 RX ORDER — CEFEPIME 1 G/1
1000 INJECTION, POWDER, FOR SOLUTION INTRAMUSCULAR; INTRAVENOUS EVERY 24 HOURS
Refills: 0 | Status: DISCONTINUED | OUTPATIENT
Start: 2020-01-01 | End: 2020-01-01

## 2020-01-01 RX ORDER — IOHEXOL 300 MG/ML
30 INJECTION, SOLUTION INTRAVENOUS ONCE
Refills: 0 | Status: COMPLETED | OUTPATIENT
Start: 2020-01-01 | End: 2020-01-01

## 2020-01-01 RX ORDER — POLYETHYLENE GLYCOL 3350 17 G/17G
17 POWDER, FOR SOLUTION ORAL DAILY
Refills: 0 | Status: DISCONTINUED | OUTPATIENT
Start: 2020-01-01 | End: 2020-01-01

## 2020-01-01 RX ORDER — PANTOPRAZOLE SODIUM 20 MG/1
1 TABLET, DELAYED RELEASE ORAL
Qty: 0 | Refills: 0 | DISCHARGE

## 2020-01-01 RX ORDER — VANCOMYCIN HCL 1 G
1000 VIAL (EA) INTRAVENOUS EVERY 12 HOURS
Refills: 0 | Status: DISCONTINUED | OUTPATIENT
Start: 2020-01-01 | End: 2020-01-01

## 2020-01-01 RX ORDER — MUPIROCIN 20 MG/G
1 OINTMENT TOPICAL EVERY 12 HOURS
Refills: 0 | Status: COMPLETED | OUTPATIENT
Start: 2020-01-01 | End: 2020-01-01

## 2020-01-01 RX ORDER — LACTOBACILLUS ACIDOPHILUS 100MM CELL
1 CAPSULE ORAL
Qty: 0 | Refills: 0 | DISCHARGE
Start: 2020-01-01

## 2020-01-01 RX ORDER — FUROSEMIDE 40 MG
20 TABLET ORAL ONCE
Refills: 0 | Status: COMPLETED | OUTPATIENT
Start: 2020-01-01 | End: 2020-01-01

## 2020-01-01 RX ORDER — CEFEPIME 1 G/1
1000 INJECTION, POWDER, FOR SOLUTION INTRAMUSCULAR; INTRAVENOUS EVERY 8 HOURS
Refills: 0 | Status: DISCONTINUED | OUTPATIENT
Start: 2020-01-01 | End: 2020-01-01

## 2020-01-01 RX ORDER — DAPTOMYCIN 500 MG/10ML
350 INJECTION, POWDER, LYOPHILIZED, FOR SOLUTION INTRAVENOUS EVERY 24 HOURS
Refills: 0 | Status: DISCONTINUED | OUTPATIENT
Start: 2020-01-01 | End: 2020-01-01

## 2020-01-01 RX ORDER — VANCOMYCIN HCL 1 G
750 VIAL (EA) INTRAVENOUS EVERY 24 HOURS
Refills: 0 | Status: DISCONTINUED | OUTPATIENT
Start: 2020-01-01 | End: 2020-01-01

## 2020-01-01 RX ORDER — MEROPENEM 1 G/30ML
1000 INJECTION INTRAVENOUS EVERY 8 HOURS
Refills: 0 | Status: COMPLETED | OUTPATIENT
Start: 2020-01-01 | End: 2020-01-01

## 2020-01-01 RX ORDER — CHLORHEXIDINE GLUCONATE 213 G/1000ML
15 SOLUTION TOPICAL EVERY 12 HOURS
Refills: 0 | Status: DISCONTINUED | OUTPATIENT
Start: 2020-01-01 | End: 2020-01-01

## 2020-01-01 RX ORDER — POTASSIUM CHLORIDE 20 MEQ
40 PACKET (EA) ORAL EVERY 4 HOURS
Refills: 0 | Status: DISCONTINUED | OUTPATIENT
Start: 2020-01-01 | End: 2020-01-01

## 2020-01-01 RX ORDER — LINEZOLID 600 MG/300ML
600 INJECTION, SOLUTION INTRAVENOUS EVERY 12 HOURS
Refills: 0 | Status: DISCONTINUED | OUTPATIENT
Start: 2020-01-01 | End: 2020-01-01

## 2020-01-01 RX ORDER — HYDROCORTISONE 20 MG
50 TABLET ORAL EVERY 6 HOURS
Refills: 0 | Status: COMPLETED | OUTPATIENT
Start: 2020-01-01 | End: 2020-01-01

## 2020-01-01 RX ORDER — SODIUM CHLORIDE 9 MG/ML
1000 INJECTION INTRAMUSCULAR; INTRAVENOUS; SUBCUTANEOUS ONCE
Refills: 0 | Status: DISCONTINUED | OUTPATIENT
Start: 2020-01-01 | End: 2020-01-01

## 2020-01-01 RX ORDER — PHENYLEPHRINE HYDROCHLORIDE 10 MG/ML
0.2 INJECTION INTRAVENOUS
Qty: 160 | Refills: 0 | Status: DISCONTINUED | OUTPATIENT
Start: 2020-01-01 | End: 2020-01-01

## 2020-01-01 RX ORDER — MEROPENEM 1 G/30ML
500 INJECTION INTRAVENOUS EVERY 8 HOURS
Refills: 0 | Status: DISCONTINUED | OUTPATIENT
Start: 2020-01-01 | End: 2020-01-01

## 2020-01-01 RX ORDER — FOLIC ACID 0.8 MG
1 TABLET ORAL DAILY
Refills: 0 | Status: DISCONTINUED | OUTPATIENT
Start: 2020-01-01 | End: 2020-01-01

## 2020-01-01 RX ORDER — FLUCONAZOLE 150 MG/1
400 TABLET ORAL ONCE
Refills: 0 | Status: COMPLETED | OUTPATIENT
Start: 2020-01-01 | End: 2020-01-01

## 2020-01-01 RX ORDER — POTASSIUM CHLORIDE 20 MEQ
10 PACKET (EA) ORAL
Refills: 0 | Status: DISCONTINUED | OUTPATIENT
Start: 2020-01-01 | End: 2020-01-01

## 2020-01-01 RX ORDER — METRONIDAZOLE 500 MG
TABLET ORAL
Refills: 0 | Status: DISCONTINUED | OUTPATIENT
Start: 2020-01-01 | End: 2020-01-01

## 2020-01-01 RX ORDER — MUPIROCIN 20 MG/G
1 OINTMENT TOPICAL EVERY 12 HOURS
Refills: 0 | Status: DISCONTINUED | OUTPATIENT
Start: 2020-01-01 | End: 2020-01-01

## 2020-01-01 RX ORDER — FLUCONAZOLE 150 MG/1
TABLET ORAL
Refills: 0 | Status: DISCONTINUED | OUTPATIENT
Start: 2020-01-01 | End: 2020-01-01

## 2020-01-01 RX ORDER — MAGNESIUM SULFATE 500 MG/ML
1 VIAL (ML) INJECTION ONCE
Refills: 0 | Status: DISCONTINUED | OUTPATIENT
Start: 2020-01-01 | End: 2020-01-01

## 2020-01-01 RX ORDER — VANCOMYCIN HCL 1 G
1000 VIAL (EA) INTRAVENOUS ONCE
Refills: 0 | Status: COMPLETED | OUTPATIENT
Start: 2020-01-01 | End: 2020-01-01

## 2020-01-01 RX ORDER — DOCUSATE SODIUM 100 MG
1 CAPSULE ORAL
Qty: 0 | Refills: 0 | DISCHARGE

## 2020-01-01 RX ORDER — SODIUM CHLORIDE 9 MG/ML
500 INJECTION, SOLUTION INTRAVENOUS ONCE
Refills: 0 | Status: COMPLETED | OUTPATIENT
Start: 2020-01-01 | End: 2020-01-01

## 2020-01-01 RX ORDER — DEXTROSE MONOHYDRATE, SODIUM CHLORIDE, AND POTASSIUM CHLORIDE 50; .745; 4.5 G/1000ML; G/1000ML; G/1000ML
1000 INJECTION, SOLUTION INTRAVENOUS
Refills: 0 | Status: COMPLETED | OUTPATIENT
Start: 2020-01-01 | End: 2020-01-01

## 2020-01-01 RX ORDER — DEXMEDETOMIDINE HYDROCHLORIDE IN 0.9% SODIUM CHLORIDE 4 UG/ML
0.1 INJECTION INTRAVENOUS
Qty: 400 | Refills: 0 | Status: DISCONTINUED | OUTPATIENT
Start: 2020-01-01 | End: 2020-01-01

## 2020-01-01 RX ORDER — POTASSIUM CHLORIDE 20 MEQ
20 PACKET (EA) ORAL
Refills: 0 | Status: COMPLETED | OUTPATIENT
Start: 2020-01-01 | End: 2020-01-01

## 2020-01-01 RX ORDER — FENTANYL CITRATE 50 UG/ML
0.05 INJECTION INTRAVENOUS
Qty: 2500 | Refills: 0 | Status: DISCONTINUED | OUTPATIENT
Start: 2020-01-01 | End: 2020-01-01

## 2020-01-01 RX ADMIN — NYSTATIN CREAM 1 APPLICATION(S): 100000 CREAM TOPICAL at 18:03

## 2020-01-01 RX ADMIN — MEROPENEM 100 MILLIGRAM(S): 1 INJECTION INTRAVENOUS at 13:23

## 2020-01-01 RX ADMIN — POTASSIUM PHOSPHATE, MONOBASIC POTASSIUM PHOSPHATE, DIBASIC 62.5 MILLIMOLE(S): 236; 224 INJECTION, SOLUTION INTRAVENOUS at 09:37

## 2020-01-01 RX ADMIN — ZINC SULFATE TAB 220 MG (50 MG ZINC EQUIVALENT) 220 MILLIGRAM(S): 220 (50 ZN) TAB at 12:14

## 2020-01-01 RX ADMIN — MUPIROCIN 1 APPLICATION(S): 20 OINTMENT TOPICAL at 05:29

## 2020-01-01 RX ADMIN — MEROPENEM 100 MILLIGRAM(S): 1 INJECTION INTRAVENOUS at 05:40

## 2020-01-01 RX ADMIN — DAPTOMYCIN 114 MILLIGRAM(S): 500 INJECTION, POWDER, LYOPHILIZED, FOR SOLUTION INTRAVENOUS at 17:35

## 2020-01-01 RX ADMIN — PANTOPRAZOLE SODIUM 40 MILLIGRAM(S): 20 TABLET, DELAYED RELEASE ORAL at 11:17

## 2020-01-01 RX ADMIN — Medication 200 MILLIGRAM(S): at 06:15

## 2020-01-01 RX ADMIN — MIDODRINE HYDROCHLORIDE 10 MILLIGRAM(S): 2.5 TABLET ORAL at 16:52

## 2020-01-01 RX ADMIN — NYSTATIN CREAM 1 APPLICATION(S): 100000 CREAM TOPICAL at 17:29

## 2020-01-01 RX ADMIN — MIDODRINE HYDROCHLORIDE 10 MILLIGRAM(S): 2.5 TABLET ORAL at 11:45

## 2020-01-01 RX ADMIN — FENTANYL CITRATE 2.81 MICROGRAM(S)/KG/HR: 50 INJECTION INTRAVENOUS at 05:25

## 2020-01-01 RX ADMIN — MIDODRINE HYDROCHLORIDE 10 MILLIGRAM(S): 2.5 TABLET ORAL at 12:42

## 2020-01-01 RX ADMIN — SODIUM CHLORIDE 75 MILLILITER(S): 9 INJECTION, SOLUTION INTRAVENOUS at 02:47

## 2020-01-01 RX ADMIN — NYSTATIN CREAM 1 APPLICATION(S): 100000 CREAM TOPICAL at 06:06

## 2020-01-01 RX ADMIN — NYSTATIN CREAM 1 APPLICATION(S): 100000 CREAM TOPICAL at 18:05

## 2020-01-01 RX ADMIN — POTASSIUM PHOSPHATE, MONOBASIC POTASSIUM PHOSPHATE, DIBASIC 62.5 MILLIMOLE(S): 236; 224 INJECTION, SOLUTION INTRAVENOUS at 08:09

## 2020-01-01 RX ADMIN — ENOXAPARIN SODIUM 50 MILLIGRAM(S): 100 INJECTION SUBCUTANEOUS at 11:39

## 2020-01-01 RX ADMIN — LINEZOLID 600 MILLIGRAM(S): 600 INJECTION, SOLUTION INTRAVENOUS at 05:16

## 2020-01-01 RX ADMIN — LACTULOSE 200 GRAM(S): 10 SOLUTION ORAL at 10:00

## 2020-01-01 RX ADMIN — ZINC SULFATE TAB 220 MG (50 MG ZINC EQUIVALENT) 220 MILLIGRAM(S): 220 (50 ZN) TAB at 12:33

## 2020-01-01 RX ADMIN — MIDODRINE HYDROCHLORIDE 10 MILLIGRAM(S): 2.5 TABLET ORAL at 05:32

## 2020-01-01 RX ADMIN — MUPIROCIN 1 APPLICATION(S): 20 OINTMENT TOPICAL at 18:51

## 2020-01-01 RX ADMIN — Medication 1 TABLET(S): at 11:40

## 2020-01-01 RX ADMIN — LACTULOSE 10 GRAM(S): 10 SOLUTION ORAL at 12:21

## 2020-01-01 RX ADMIN — DAPTOMYCIN 114 MILLIGRAM(S): 500 INJECTION, POWDER, LYOPHILIZED, FOR SOLUTION INTRAVENOUS at 18:08

## 2020-01-01 RX ADMIN — Medication 100 MILLIEQUIVALENT(S): at 13:39

## 2020-01-01 RX ADMIN — Medication 50 MILLILITER(S): at 12:22

## 2020-01-01 RX ADMIN — MEROPENEM 100 MILLIGRAM(S): 1 INJECTION INTRAVENOUS at 05:25

## 2020-01-01 RX ADMIN — SODIUM CHLORIDE 50 MILLILITER(S): 9 INJECTION, SOLUTION INTRAVENOUS at 01:39

## 2020-01-01 RX ADMIN — Medication 650 MILLIGRAM(S): at 20:32

## 2020-01-01 RX ADMIN — Medication 100 MILLIGRAM(S): at 00:10

## 2020-01-01 RX ADMIN — Medication 100 MILLIEQUIVALENT(S): at 12:40

## 2020-01-01 RX ADMIN — CEFEPIME 100 MILLIGRAM(S): 1 INJECTION, POWDER, FOR SOLUTION INTRAMUSCULAR; INTRAVENOUS at 17:43

## 2020-01-01 RX ADMIN — LACTULOSE 30 GRAM(S): 10 SOLUTION ORAL at 05:07

## 2020-01-01 RX ADMIN — Medication 1 TABLET(S): at 09:21

## 2020-01-01 RX ADMIN — Medication 500 MILLIGRAM(S): at 13:53

## 2020-01-01 RX ADMIN — POLYETHYLENE GLYCOL 3350 17 GRAM(S): 17 POWDER, FOR SOLUTION ORAL at 12:16

## 2020-01-01 RX ADMIN — PANTOPRAZOLE SODIUM 40 MILLIGRAM(S): 20 TABLET, DELAYED RELEASE ORAL at 17:42

## 2020-01-01 RX ADMIN — ZINC SULFATE TAB 220 MG (50 MG ZINC EQUIVALENT) 220 MILLIGRAM(S): 220 (50 ZN) TAB at 12:41

## 2020-01-01 RX ADMIN — PANTOPRAZOLE SODIUM 40 MILLIGRAM(S): 20 TABLET, DELAYED RELEASE ORAL at 17:22

## 2020-01-01 RX ADMIN — NYSTATIN CREAM 1 APPLICATION(S): 100000 CREAM TOPICAL at 09:38

## 2020-01-01 RX ADMIN — Medication 50 MILLILITER(S): at 00:29

## 2020-01-01 RX ADMIN — MEROPENEM 100 MILLIGRAM(S): 1 INJECTION INTRAVENOUS at 21:56

## 2020-01-01 RX ADMIN — Medication 500 MILLIGRAM(S): at 17:05

## 2020-01-01 RX ADMIN — ENOXAPARIN SODIUM 50 MILLIGRAM(S): 100 INJECTION SUBCUTANEOUS at 11:10

## 2020-01-01 RX ADMIN — Medication 50 MILLILITER(S): at 18:15

## 2020-01-01 RX ADMIN — MEROPENEM 100 MILLIGRAM(S): 1 INJECTION INTRAVENOUS at 08:00

## 2020-01-01 RX ADMIN — FLUCONAZOLE 100 MILLIGRAM(S): 150 TABLET ORAL at 17:53

## 2020-01-01 RX ADMIN — Medication 20 MILLIGRAM(S): at 17:28

## 2020-01-01 RX ADMIN — SENNA PLUS 2 TABLET(S): 8.6 TABLET ORAL at 21:14

## 2020-01-01 RX ADMIN — NYSTATIN CREAM 1 APPLICATION(S): 100000 CREAM TOPICAL at 17:07

## 2020-01-01 RX ADMIN — HEPARIN SODIUM 5000 UNIT(S): 5000 INJECTION INTRAVENOUS; SUBCUTANEOUS at 06:38

## 2020-01-01 RX ADMIN — CEFEPIME 100 MILLIGRAM(S): 1 INJECTION, POWDER, FOR SOLUTION INTRAMUSCULAR; INTRAVENOUS at 17:53

## 2020-01-01 RX ADMIN — Medication 50 GRAM(S): at 10:58

## 2020-01-01 RX ADMIN — Medication 500 MILLIGRAM(S): at 17:13

## 2020-01-01 RX ADMIN — Medication 50 MILLIGRAM(S): at 12:28

## 2020-01-01 RX ADMIN — LACTULOSE 20 GRAM(S): 10 SOLUTION ORAL at 13:40

## 2020-01-01 RX ADMIN — Medication 40 MILLIGRAM(S): at 18:38

## 2020-01-01 RX ADMIN — NYSTATIN CREAM 1 APPLICATION(S): 100000 CREAM TOPICAL at 06:10

## 2020-01-01 RX ADMIN — SENNA PLUS 2 TABLET(S): 8.6 TABLET ORAL at 21:54

## 2020-01-01 RX ADMIN — LACTULOSE 10 GRAM(S): 10 SOLUTION ORAL at 12:59

## 2020-01-01 RX ADMIN — SODIUM CHLORIDE 50 MILLILITER(S): 9 INJECTION, SOLUTION INTRAVENOUS at 09:33

## 2020-01-01 RX ADMIN — LACTULOSE 30 GRAM(S): 10 SOLUTION ORAL at 23:07

## 2020-01-01 RX ADMIN — Medication 650 MILLIGRAM(S): at 21:31

## 2020-01-01 RX ADMIN — Medication 650 MILLIGRAM(S): at 05:38

## 2020-01-01 RX ADMIN — FLUCONAZOLE 100 MILLIGRAM(S): 150 TABLET ORAL at 17:02

## 2020-01-01 RX ADMIN — Medication 40 MILLIGRAM(S): at 01:24

## 2020-01-01 RX ADMIN — Medication 650 MILLIGRAM(S): at 21:32

## 2020-01-01 RX ADMIN — PANTOPRAZOLE SODIUM 40 MILLIGRAM(S): 20 TABLET, DELAYED RELEASE ORAL at 06:05

## 2020-01-01 RX ADMIN — Medication 1 TABLET(S): at 11:37

## 2020-01-01 RX ADMIN — ZINC SULFATE TAB 220 MG (50 MG ZINC EQUIVALENT) 220 MILLIGRAM(S): 220 (50 ZN) TAB at 12:06

## 2020-01-01 RX ADMIN — HEPARIN SODIUM 5000 UNIT(S): 5000 INJECTION INTRAVENOUS; SUBCUTANEOUS at 17:35

## 2020-01-01 RX ADMIN — NYSTATIN CREAM 1 APPLICATION(S): 100000 CREAM TOPICAL at 06:39

## 2020-01-01 RX ADMIN — NYSTATIN CREAM 1 APPLICATION(S): 100000 CREAM TOPICAL at 06:28

## 2020-01-01 RX ADMIN — POTASSIUM PHOSPHATE, MONOBASIC POTASSIUM PHOSPHATE, DIBASIC 62.5 MILLIMOLE(S): 236; 224 INJECTION, SOLUTION INTRAVENOUS at 09:01

## 2020-01-01 RX ADMIN — ENOXAPARIN SODIUM 60 MILLIGRAM(S): 100 INJECTION SUBCUTANEOUS at 18:16

## 2020-01-01 RX ADMIN — Medication 100 MILLIGRAM(S): at 13:09

## 2020-01-01 RX ADMIN — Medication 325 MILLIGRAM(S): at 12:08

## 2020-01-01 RX ADMIN — LACTULOSE 30 GRAM(S): 10 SOLUTION ORAL at 12:23

## 2020-01-01 RX ADMIN — Medication 500 MILLIGRAM(S): at 11:45

## 2020-01-01 RX ADMIN — Medication 50 MILLILITER(S): at 18:52

## 2020-01-01 RX ADMIN — ZINC SULFATE TAB 220 MG (50 MG ZINC EQUIVALENT) 220 MILLIGRAM(S): 220 (50 ZN) TAB at 12:21

## 2020-01-01 RX ADMIN — CHLORHEXIDINE GLUCONATE 15 MILLILITER(S): 213 SOLUTION TOPICAL at 06:27

## 2020-01-01 RX ADMIN — MIDODRINE HYDROCHLORIDE 10 MILLIGRAM(S): 2.5 TABLET ORAL at 17:18

## 2020-01-01 RX ADMIN — CHLORHEXIDINE GLUCONATE 1 APPLICATION(S): 213 SOLUTION TOPICAL at 11:25

## 2020-01-01 RX ADMIN — ENOXAPARIN SODIUM 60 MILLIGRAM(S): 100 INJECTION SUBCUTANEOUS at 18:02

## 2020-01-01 RX ADMIN — DEXTROSE MONOHYDRATE, SODIUM CHLORIDE, AND POTASSIUM CHLORIDE 50 MILLILITER(S): 50; .745; 4.5 INJECTION, SOLUTION INTRAVENOUS at 21:33

## 2020-01-01 RX ADMIN — MEROPENEM 100 MILLIGRAM(S): 1 INJECTION INTRAVENOUS at 14:19

## 2020-01-01 RX ADMIN — LACTULOSE 30 GRAM(S): 10 SOLUTION ORAL at 23:33

## 2020-01-01 RX ADMIN — MEROPENEM 100 MILLIGRAM(S): 1 INJECTION INTRAVENOUS at 05:12

## 2020-01-01 RX ADMIN — MEROPENEM 100 MILLIGRAM(S): 1 INJECTION INTRAVENOUS at 22:04

## 2020-01-01 RX ADMIN — DEXTROSE MONOHYDRATE, SODIUM CHLORIDE, AND POTASSIUM CHLORIDE 50 MILLILITER(S): 50; .745; 4.5 INJECTION, SOLUTION INTRAVENOUS at 23:37

## 2020-01-01 RX ADMIN — NYSTATIN CREAM 1 APPLICATION(S): 100000 CREAM TOPICAL at 05:13

## 2020-01-01 RX ADMIN — ENOXAPARIN SODIUM 50 MILLIGRAM(S): 100 INJECTION SUBCUTANEOUS at 11:40

## 2020-01-01 RX ADMIN — Medication 100 MILLIGRAM(S): at 22:43

## 2020-01-01 RX ADMIN — CHLORHEXIDINE GLUCONATE 15 MILLILITER(S): 213 SOLUTION TOPICAL at 05:04

## 2020-01-01 RX ADMIN — NYSTATIN CREAM 1 APPLICATION(S): 100000 CREAM TOPICAL at 18:02

## 2020-01-01 RX ADMIN — Medication 40 MILLIEQUIVALENT(S): at 19:03

## 2020-01-01 RX ADMIN — Medication 100 MILLIGRAM(S): at 06:21

## 2020-01-01 RX ADMIN — Medication 50 MILLILITER(S): at 12:58

## 2020-01-01 RX ADMIN — HEPARIN SODIUM 5000 UNIT(S): 5000 INJECTION INTRAVENOUS; SUBCUTANEOUS at 05:45

## 2020-01-01 RX ADMIN — LACTULOSE 30 GRAM(S): 10 SOLUTION ORAL at 17:08

## 2020-01-01 RX ADMIN — Medication 40 MILLIGRAM(S): at 11:47

## 2020-01-01 RX ADMIN — MEROPENEM 100 MILLIGRAM(S): 1 INJECTION INTRAVENOUS at 05:09

## 2020-01-01 RX ADMIN — FLUCONAZOLE 100 MILLIGRAM(S): 150 TABLET ORAL at 17:35

## 2020-01-01 RX ADMIN — PANTOPRAZOLE SODIUM 40 MILLIGRAM(S): 20 TABLET, DELAYED RELEASE ORAL at 12:36

## 2020-01-01 RX ADMIN — SODIUM CHLORIDE 1000 MILLILITER(S): 9 INJECTION INTRAMUSCULAR; INTRAVENOUS; SUBCUTANEOUS at 06:02

## 2020-01-01 RX ADMIN — Medication 1 TABLET(S): at 11:42

## 2020-01-01 RX ADMIN — HEPARIN SODIUM 5000 UNIT(S): 5000 INJECTION INTRAVENOUS; SUBCUTANEOUS at 06:27

## 2020-01-01 RX ADMIN — MEROPENEM 100 MILLIGRAM(S): 1 INJECTION INTRAVENOUS at 13:25

## 2020-01-01 RX ADMIN — Medication 1 APPLICATION(S): at 18:03

## 2020-01-01 RX ADMIN — HEPARIN SODIUM 5000 UNIT(S): 5000 INJECTION INTRAVENOUS; SUBCUTANEOUS at 17:38

## 2020-01-01 RX ADMIN — PANTOPRAZOLE SODIUM 40 MILLIGRAM(S): 20 TABLET, DELAYED RELEASE ORAL at 05:34

## 2020-01-01 RX ADMIN — NYSTATIN CREAM 1 APPLICATION(S): 100000 CREAM TOPICAL at 08:05

## 2020-01-01 RX ADMIN — Medication 500 MILLIGRAM(S): at 12:19

## 2020-01-01 RX ADMIN — CEFEPIME 100 MILLIGRAM(S): 1 INJECTION, POWDER, FOR SOLUTION INTRAMUSCULAR; INTRAVENOUS at 05:30

## 2020-01-01 RX ADMIN — NYSTATIN CREAM 1 APPLICATION(S): 100000 CREAM TOPICAL at 17:28

## 2020-01-01 RX ADMIN — IOHEXOL 30 MILLILITER(S): 300 INJECTION, SOLUTION INTRAVENOUS at 12:25

## 2020-01-01 RX ADMIN — HEPARIN SODIUM 5000 UNIT(S): 5000 INJECTION INTRAVENOUS; SUBCUTANEOUS at 18:06

## 2020-01-01 RX ADMIN — LACTULOSE 30 GRAM(S): 10 SOLUTION ORAL at 05:04

## 2020-01-01 RX ADMIN — FLUCONAZOLE 100 MILLIGRAM(S): 150 TABLET ORAL at 18:15

## 2020-01-01 RX ADMIN — POTASSIUM PHOSPHATE, MONOBASIC POTASSIUM PHOSPHATE, DIBASIC 62.5 MILLIMOLE(S): 236; 224 INJECTION, SOLUTION INTRAVENOUS at 10:53

## 2020-01-01 RX ADMIN — PANTOPRAZOLE SODIUM 40 MILLIGRAM(S): 20 TABLET, DELAYED RELEASE ORAL at 05:12

## 2020-01-01 RX ADMIN — Medication 500 MILLIGRAM(S): at 05:10

## 2020-01-01 RX ADMIN — ENOXAPARIN SODIUM 50 MILLIGRAM(S): 100 INJECTION SUBCUTANEOUS at 12:08

## 2020-01-01 RX ADMIN — NYSTATIN CREAM 1 APPLICATION(S): 100000 CREAM TOPICAL at 19:18

## 2020-01-01 RX ADMIN — ZINC SULFATE TAB 220 MG (50 MG ZINC EQUIVALENT) 220 MILLIGRAM(S): 220 (50 ZN) TAB at 12:04

## 2020-01-01 RX ADMIN — Medication 100 MILLIGRAM(S): at 14:00

## 2020-01-01 RX ADMIN — PANTOPRAZOLE SODIUM 40 MILLIGRAM(S): 20 TABLET, DELAYED RELEASE ORAL at 12:32

## 2020-01-01 RX ADMIN — POTASSIUM PHOSPHATE, MONOBASIC POTASSIUM PHOSPHATE, DIBASIC 62.5 MILLIMOLE(S): 236; 224 INJECTION, SOLUTION INTRAVENOUS at 12:45

## 2020-01-01 RX ADMIN — LACTULOSE 10 GRAM(S): 10 SOLUTION ORAL at 12:25

## 2020-01-01 RX ADMIN — Medication 500 MILLIGRAM(S): at 07:55

## 2020-01-01 RX ADMIN — LINEZOLID 600 MILLIGRAM(S): 600 INJECTION, SOLUTION INTRAVENOUS at 17:33

## 2020-01-01 RX ADMIN — CHLORHEXIDINE GLUCONATE 1 APPLICATION(S): 213 SOLUTION TOPICAL at 12:14

## 2020-01-01 RX ADMIN — Medication 80 MILLIGRAM(S): at 06:50

## 2020-01-01 RX ADMIN — ENOXAPARIN SODIUM 50 MILLIGRAM(S): 100 INJECTION SUBCUTANEOUS at 11:41

## 2020-01-01 RX ADMIN — SODIUM CHLORIDE 50 MILLILITER(S): 9 INJECTION, SOLUTION INTRAVENOUS at 09:57

## 2020-01-01 RX ADMIN — DAPTOMYCIN 114 MILLIGRAM(S): 500 INJECTION, POWDER, LYOPHILIZED, FOR SOLUTION INTRAVENOUS at 17:52

## 2020-01-01 RX ADMIN — Medication 80 MILLIGRAM(S): at 05:23

## 2020-01-01 RX ADMIN — CHLORHEXIDINE GLUCONATE 1 APPLICATION(S): 213 SOLUTION TOPICAL at 08:25

## 2020-01-01 RX ADMIN — MEROPENEM 100 MILLIGRAM(S): 1 INJECTION INTRAVENOUS at 14:08

## 2020-01-01 RX ADMIN — Medication 80 MILLIGRAM(S): at 17:35

## 2020-01-01 RX ADMIN — Medication 100 MILLIEQUIVALENT(S): at 09:00

## 2020-01-01 RX ADMIN — Medication 100 GRAM(S): at 13:32

## 2020-01-01 RX ADMIN — NYSTATIN CREAM 1 APPLICATION(S): 100000 CREAM TOPICAL at 06:16

## 2020-01-01 RX ADMIN — Medication 1 APPLICATION(S): at 08:42

## 2020-01-01 RX ADMIN — NYSTATIN CREAM 1 APPLICATION(S): 100000 CREAM TOPICAL at 05:06

## 2020-01-01 RX ADMIN — Medication 1 TABLET(S): at 11:46

## 2020-01-01 RX ADMIN — MIDODRINE HYDROCHLORIDE 10 MILLIGRAM(S): 2.5 TABLET ORAL at 11:28

## 2020-01-01 RX ADMIN — POTASSIUM PHOSPHATE, MONOBASIC POTASSIUM PHOSPHATE, DIBASIC 62.5 MILLIMOLE(S): 236; 224 INJECTION, SOLUTION INTRAVENOUS at 02:33

## 2020-01-01 RX ADMIN — CHLORHEXIDINE GLUCONATE 1 APPLICATION(S): 213 SOLUTION TOPICAL at 11:46

## 2020-01-01 RX ADMIN — PANTOPRAZOLE SODIUM 40 MILLIGRAM(S): 20 TABLET, DELAYED RELEASE ORAL at 05:57

## 2020-01-01 RX ADMIN — Medication 500 MILLIGRAM(S): at 06:18

## 2020-01-01 RX ADMIN — DAPTOMYCIN 114 MILLIGRAM(S): 500 INJECTION, POWDER, LYOPHILIZED, FOR SOLUTION INTRAVENOUS at 18:52

## 2020-01-01 RX ADMIN — MUPIROCIN 1 APPLICATION(S): 20 OINTMENT TOPICAL at 18:50

## 2020-01-01 RX ADMIN — DAPTOMYCIN 114 MILLIGRAM(S): 500 INJECTION, POWDER, LYOPHILIZED, FOR SOLUTION INTRAVENOUS at 17:19

## 2020-01-01 RX ADMIN — Medication 100 MILLIEQUIVALENT(S): at 17:20

## 2020-01-01 RX ADMIN — MIDODRINE HYDROCHLORIDE 10 MILLIGRAM(S): 2.5 TABLET ORAL at 19:16

## 2020-01-01 RX ADMIN — NYSTATIN CREAM 1 APPLICATION(S): 100000 CREAM TOPICAL at 05:47

## 2020-01-01 RX ADMIN — Medication 20 MILLIGRAM(S): at 05:13

## 2020-01-01 RX ADMIN — MIDODRINE HYDROCHLORIDE 10 MILLIGRAM(S): 2.5 TABLET ORAL at 18:05

## 2020-01-01 RX ADMIN — Medication 250 MILLIGRAM(S): at 18:06

## 2020-01-01 RX ADMIN — Medication 500 MILLIGRAM(S): at 17:41

## 2020-01-01 RX ADMIN — Medication 500 MILLIGRAM(S): at 05:30

## 2020-01-01 RX ADMIN — MEROPENEM 100 MILLIGRAM(S): 1 INJECTION INTRAVENOUS at 05:30

## 2020-01-01 RX ADMIN — Medication 1 TABLET(S): at 11:23

## 2020-01-01 RX ADMIN — ZINC SULFATE TAB 220 MG (50 MG ZINC EQUIVALENT) 220 MILLIGRAM(S): 220 (50 ZN) TAB at 12:29

## 2020-01-01 RX ADMIN — Medication 650 MILLIGRAM(S): at 04:00

## 2020-01-01 RX ADMIN — Medication 200 MILLIGRAM(S): at 17:31

## 2020-01-01 RX ADMIN — PANTOPRAZOLE SODIUM 40 MILLIGRAM(S): 20 TABLET, DELAYED RELEASE ORAL at 18:02

## 2020-01-01 RX ADMIN — MIDODRINE HYDROCHLORIDE 10 MILLIGRAM(S): 2.5 TABLET ORAL at 17:15

## 2020-01-01 RX ADMIN — NYSTATIN CREAM 1 APPLICATION(S): 100000 CREAM TOPICAL at 16:47

## 2020-01-01 RX ADMIN — POTASSIUM PHOSPHATE, MONOBASIC POTASSIUM PHOSPHATE, DIBASIC 62.5 MILLIMOLE(S): 236; 224 INJECTION, SOLUTION INTRAVENOUS at 09:27

## 2020-01-01 RX ADMIN — CEFEPIME 100 MILLIGRAM(S): 1 INJECTION, POWDER, FOR SOLUTION INTRAMUSCULAR; INTRAVENOUS at 21:13

## 2020-01-01 RX ADMIN — Medication 100 MILLIEQUIVALENT(S): at 11:19

## 2020-01-01 RX ADMIN — DEXTROSE MONOHYDRATE, SODIUM CHLORIDE, AND POTASSIUM CHLORIDE 100 MILLILITER(S): 50; .745; 4.5 INJECTION, SOLUTION INTRAVENOUS at 11:20

## 2020-01-01 RX ADMIN — CHLORHEXIDINE GLUCONATE 1 APPLICATION(S): 213 SOLUTION TOPICAL at 12:23

## 2020-01-01 RX ADMIN — NYSTATIN CREAM 1 APPLICATION(S): 100000 CREAM TOPICAL at 17:26

## 2020-01-01 RX ADMIN — Medication 100 MILLIGRAM(S): at 13:58

## 2020-01-01 RX ADMIN — Medication 1 TABLET(S): at 12:04

## 2020-01-01 RX ADMIN — LACTULOSE 30 GRAM(S): 10 SOLUTION ORAL at 05:13

## 2020-01-01 RX ADMIN — Medication 20 MILLIGRAM(S): at 05:39

## 2020-01-01 RX ADMIN — DAPTOMYCIN 114 MILLIGRAM(S): 500 INJECTION, POWDER, LYOPHILIZED, FOR SOLUTION INTRAVENOUS at 17:53

## 2020-01-01 RX ADMIN — PANTOPRAZOLE SODIUM 40 MILLIGRAM(S): 20 TABLET, DELAYED RELEASE ORAL at 05:31

## 2020-01-01 RX ADMIN — Medication 40 MILLIGRAM(S): at 05:29

## 2020-01-01 RX ADMIN — NYSTATIN CREAM 1 APPLICATION(S): 100000 CREAM TOPICAL at 18:54

## 2020-01-01 RX ADMIN — Medication 100 MILLIGRAM(S): at 06:47

## 2020-01-01 RX ADMIN — Medication 500 MILLIGRAM(S): at 18:33

## 2020-01-01 RX ADMIN — Medication 1 TABLET(S): at 08:42

## 2020-01-01 RX ADMIN — CHLORHEXIDINE GLUCONATE 1 APPLICATION(S): 213 SOLUTION TOPICAL at 11:52

## 2020-01-01 RX ADMIN — LACTULOSE 30 GRAM(S): 10 SOLUTION ORAL at 04:13

## 2020-01-01 RX ADMIN — ENOXAPARIN SODIUM 50 MILLIGRAM(S): 100 INJECTION SUBCUTANEOUS at 12:02

## 2020-01-01 RX ADMIN — DAPTOMYCIN 114 MILLIGRAM(S): 500 INJECTION, POWDER, LYOPHILIZED, FOR SOLUTION INTRAVENOUS at 17:25

## 2020-01-01 RX ADMIN — LACTULOSE 30 GRAM(S): 10 SOLUTION ORAL at 17:26

## 2020-01-01 RX ADMIN — Medication 100 MILLIEQUIVALENT(S): at 14:13

## 2020-01-01 RX ADMIN — MUPIROCIN 1 APPLICATION(S): 20 OINTMENT TOPICAL at 05:34

## 2020-01-01 RX ADMIN — Medication 50 MILLILITER(S): at 05:40

## 2020-01-01 RX ADMIN — Medication 500 MILLIGRAM(S): at 17:33

## 2020-01-01 RX ADMIN — Medication 20 MILLIGRAM(S): at 05:35

## 2020-01-01 RX ADMIN — CHLORHEXIDINE GLUCONATE 1 APPLICATION(S): 213 SOLUTION TOPICAL at 12:26

## 2020-01-01 RX ADMIN — SODIUM CHLORIDE 75 MILLILITER(S): 9 INJECTION, SOLUTION INTRAVENOUS at 20:00

## 2020-01-01 RX ADMIN — FENTANYL CITRATE 2.81 MICROGRAM(S)/KG/HR: 50 INJECTION INTRAVENOUS at 08:35

## 2020-01-01 RX ADMIN — CHLORHEXIDINE GLUCONATE 1 APPLICATION(S): 213 SOLUTION TOPICAL at 11:47

## 2020-01-01 RX ADMIN — NYSTATIN CREAM 1 APPLICATION(S): 100000 CREAM TOPICAL at 05:35

## 2020-01-01 RX ADMIN — Medication 40 MILLIEQUIVALENT(S): at 13:31

## 2020-01-01 RX ADMIN — PANTOPRAZOLE SODIUM 40 MILLIGRAM(S): 20 TABLET, DELAYED RELEASE ORAL at 09:21

## 2020-01-01 RX ADMIN — Medication 100 MILLIGRAM(S): at 06:42

## 2020-01-01 RX ADMIN — Medication 50 MILLIGRAM(S): at 01:05

## 2020-01-01 RX ADMIN — Medication 100 MILLIEQUIVALENT(S): at 17:42

## 2020-01-01 RX ADMIN — Medication 100 MILLIEQUIVALENT(S): at 17:01

## 2020-01-01 RX ADMIN — POTASSIUM PHOSPHATE, MONOBASIC POTASSIUM PHOSPHATE, DIBASIC 62.5 MILLIMOLE(S): 236; 224 INJECTION, SOLUTION INTRAVENOUS at 08:49

## 2020-01-01 RX ADMIN — Medication 20 MILLIGRAM(S): at 05:22

## 2020-01-01 RX ADMIN — Medication 20 MILLIGRAM(S): at 05:16

## 2020-01-01 RX ADMIN — NYSTATIN CREAM 1 APPLICATION(S): 100000 CREAM TOPICAL at 06:11

## 2020-01-01 RX ADMIN — NYSTATIN CREAM 1 APPLICATION(S): 100000 CREAM TOPICAL at 17:05

## 2020-01-01 RX ADMIN — FLUCONAZOLE 100 MILLIGRAM(S): 150 TABLET ORAL at 17:36

## 2020-01-01 RX ADMIN — HEPARIN SODIUM 5000 UNIT(S): 5000 INJECTION INTRAVENOUS; SUBCUTANEOUS at 05:57

## 2020-01-01 RX ADMIN — PANTOPRAZOLE SODIUM 40 MILLIGRAM(S): 20 TABLET, DELAYED RELEASE ORAL at 12:06

## 2020-01-01 RX ADMIN — FLUCONAZOLE 100 MILLIGRAM(S): 150 TABLET ORAL at 17:21

## 2020-01-01 RX ADMIN — FLUCONAZOLE 100 MILLIGRAM(S): 150 TABLET ORAL at 17:30

## 2020-01-01 RX ADMIN — Medication 50 MILLILITER(S): at 13:33

## 2020-01-01 RX ADMIN — FLUCONAZOLE 100 MILLIGRAM(S): 150 TABLET ORAL at 17:18

## 2020-01-01 RX ADMIN — NYSTATIN CREAM 1 APPLICATION(S): 100000 CREAM TOPICAL at 05:53

## 2020-01-01 RX ADMIN — Medication 20 MILLIGRAM(S): at 05:29

## 2020-01-01 RX ADMIN — PANTOPRAZOLE SODIUM 40 MILLIGRAM(S): 20 TABLET, DELAYED RELEASE ORAL at 17:14

## 2020-01-01 RX ADMIN — Medication 1 TABLET(S): at 12:25

## 2020-01-01 RX ADMIN — Medication 1 TABLET(S): at 12:55

## 2020-01-01 RX ADMIN — ZINC SULFATE TAB 220 MG (50 MG ZINC EQUIVALENT) 220 MILLIGRAM(S): 220 (50 ZN) TAB at 12:25

## 2020-01-01 RX ADMIN — Medication 500 MILLIGRAM(S): at 17:35

## 2020-01-01 RX ADMIN — Medication 100 MILLIEQUIVALENT(S): at 12:44

## 2020-01-01 RX ADMIN — LACTULOSE 20 GRAM(S): 10 SOLUTION ORAL at 06:13

## 2020-01-01 RX ADMIN — Medication 500 MILLIGRAM(S): at 05:38

## 2020-01-01 RX ADMIN — CHLORHEXIDINE GLUCONATE 1 APPLICATION(S): 213 SOLUTION TOPICAL at 11:36

## 2020-01-01 RX ADMIN — PANTOPRAZOLE SODIUM 40 MILLIGRAM(S): 20 TABLET, DELAYED RELEASE ORAL at 11:23

## 2020-01-01 RX ADMIN — Medication 100 MILLIEQUIVALENT(S): at 14:45

## 2020-01-01 RX ADMIN — POTASSIUM PHOSPHATE, MONOBASIC POTASSIUM PHOSPHATE, DIBASIC 62.5 MILLIMOLE(S): 236; 224 INJECTION, SOLUTION INTRAVENOUS at 13:33

## 2020-01-01 RX ADMIN — Medication 650 MILLIGRAM(S): at 05:19

## 2020-01-01 RX ADMIN — MIDODRINE HYDROCHLORIDE 10 MILLIGRAM(S): 2.5 TABLET ORAL at 11:41

## 2020-01-01 RX ADMIN — MIDODRINE HYDROCHLORIDE 10 MILLIGRAM(S): 2.5 TABLET ORAL at 05:13

## 2020-01-01 RX ADMIN — Medication 40 MILLIGRAM(S): at 17:24

## 2020-01-01 RX ADMIN — ENOXAPARIN SODIUM 50 MILLIGRAM(S): 100 INJECTION SUBCUTANEOUS at 12:05

## 2020-01-01 RX ADMIN — Medication 1 TABLET(S): at 12:14

## 2020-01-01 RX ADMIN — NYSTATIN CREAM 1 APPLICATION(S): 100000 CREAM TOPICAL at 05:25

## 2020-01-01 RX ADMIN — Medication 500 MILLIGRAM(S): at 16:47

## 2020-01-01 RX ADMIN — PANTOPRAZOLE SODIUM 40 MILLIGRAM(S): 20 TABLET, DELAYED RELEASE ORAL at 12:41

## 2020-01-01 RX ADMIN — CHLORHEXIDINE GLUCONATE 15 MILLILITER(S): 213 SOLUTION TOPICAL at 05:24

## 2020-01-01 RX ADMIN — FLUCONAZOLE 100 MILLIGRAM(S): 150 TABLET ORAL at 18:07

## 2020-01-01 RX ADMIN — Medication 100 MILLIEQUIVALENT(S): at 12:59

## 2020-01-01 RX ADMIN — LACTULOSE 30 GRAM(S): 10 SOLUTION ORAL at 23:30

## 2020-01-01 RX ADMIN — MEROPENEM 100 MILLIGRAM(S): 1 INJECTION INTRAVENOUS at 14:38

## 2020-01-01 RX ADMIN — Medication 500 MILLIGRAM(S): at 17:26

## 2020-01-01 RX ADMIN — Medication 1 TABLET(S): at 11:52

## 2020-01-01 RX ADMIN — CHLORHEXIDINE GLUCONATE 15 MILLILITER(S): 213 SOLUTION TOPICAL at 17:35

## 2020-01-01 RX ADMIN — POTASSIUM PHOSPHATE, MONOBASIC POTASSIUM PHOSPHATE, DIBASIC 62.5 MILLIMOLE(S): 236; 224 INJECTION, SOLUTION INTRAVENOUS at 09:04

## 2020-01-01 RX ADMIN — Medication 500 MILLIGRAM(S): at 05:55

## 2020-01-01 RX ADMIN — NYSTATIN CREAM 1 APPLICATION(S): 100000 CREAM TOPICAL at 17:46

## 2020-01-01 RX ADMIN — MEROPENEM 100 MILLIGRAM(S): 1 INJECTION INTRAVENOUS at 22:12

## 2020-01-01 RX ADMIN — Medication 100 MILLIEQUIVALENT(S): at 09:17

## 2020-01-01 RX ADMIN — MIDODRINE HYDROCHLORIDE 10 MILLIGRAM(S): 2.5 TABLET ORAL at 11:14

## 2020-01-01 RX ADMIN — PANTOPRAZOLE SODIUM 40 MILLIGRAM(S): 20 TABLET, DELAYED RELEASE ORAL at 18:54

## 2020-01-01 RX ADMIN — POTASSIUM PHOSPHATE, MONOBASIC POTASSIUM PHOSPHATE, DIBASIC 62.5 MILLIMOLE(S): 236; 224 INJECTION, SOLUTION INTRAVENOUS at 22:39

## 2020-01-01 RX ADMIN — ZINC SULFATE TAB 220 MG (50 MG ZINC EQUIVALENT) 220 MILLIGRAM(S): 220 (50 ZN) TAB at 11:41

## 2020-01-01 RX ADMIN — RIVAROXABAN 20 MILLIGRAM(S): KIT at 18:52

## 2020-01-01 RX ADMIN — NYSTATIN CREAM 1 APPLICATION(S): 100000 CREAM TOPICAL at 05:44

## 2020-01-01 RX ADMIN — MIDODRINE HYDROCHLORIDE 10 MILLIGRAM(S): 2.5 TABLET ORAL at 12:00

## 2020-01-01 RX ADMIN — Medication 1 PACKET(S): at 18:52

## 2020-01-01 RX ADMIN — PANTOPRAZOLE SODIUM 40 MILLIGRAM(S): 20 TABLET, DELAYED RELEASE ORAL at 07:02

## 2020-01-01 RX ADMIN — SODIUM CHLORIDE 40 MILLILITER(S): 9 INJECTION, SOLUTION INTRAVENOUS at 06:49

## 2020-01-01 RX ADMIN — Medication 40 MILLIGRAM(S): at 10:03

## 2020-01-01 RX ADMIN — Medication 100 MILLIEQUIVALENT(S): at 05:28

## 2020-01-01 RX ADMIN — Medication 100 MILLIGRAM(S): at 21:42

## 2020-01-01 RX ADMIN — ENOXAPARIN SODIUM 60 MILLIGRAM(S): 100 INJECTION SUBCUTANEOUS at 06:44

## 2020-01-01 RX ADMIN — ENOXAPARIN SODIUM 50 MILLIGRAM(S): 100 INJECTION SUBCUTANEOUS at 12:18

## 2020-01-01 RX ADMIN — MIDODRINE HYDROCHLORIDE 10 MILLIGRAM(S): 2.5 TABLET ORAL at 11:59

## 2020-01-01 RX ADMIN — POTASSIUM PHOSPHATE, MONOBASIC POTASSIUM PHOSPHATE, DIBASIC 62.5 MILLIMOLE(S): 236; 224 INJECTION, SOLUTION INTRAVENOUS at 09:17

## 2020-01-01 RX ADMIN — CEFEPIME 100 MILLIGRAM(S): 1 INJECTION, POWDER, FOR SOLUTION INTRAMUSCULAR; INTRAVENOUS at 05:27

## 2020-01-01 RX ADMIN — Medication 500 MILLIGRAM(S): at 12:28

## 2020-01-01 RX ADMIN — POLYETHYLENE GLYCOL 3350 17 GRAM(S): 17 POWDER, FOR SOLUTION ORAL at 12:22

## 2020-01-01 RX ADMIN — SODIUM CHLORIDE 50 MILLILITER(S): 9 INJECTION, SOLUTION INTRAVENOUS at 19:15

## 2020-01-01 RX ADMIN — DEXTROSE MONOHYDRATE, SODIUM CHLORIDE, AND POTASSIUM CHLORIDE 100 MILLILITER(S): 50; .745; 4.5 INJECTION, SOLUTION INTRAVENOUS at 17:44

## 2020-01-01 RX ADMIN — Medication 400 MILLIGRAM(S): at 20:27

## 2020-01-01 RX ADMIN — CHLORHEXIDINE GLUCONATE 1 APPLICATION(S): 213 SOLUTION TOPICAL at 12:21

## 2020-01-01 RX ADMIN — Medication 40 MILLIGRAM(S): at 05:56

## 2020-01-01 RX ADMIN — Medication 50 MILLILITER(S): at 06:02

## 2020-01-01 RX ADMIN — LACTULOSE 30 GRAM(S): 10 SOLUTION ORAL at 17:22

## 2020-01-01 RX ADMIN — Medication 1 TABLET(S): at 11:10

## 2020-01-01 RX ADMIN — Medication 40 MILLIGRAM(S): at 10:10

## 2020-01-01 RX ADMIN — DAPTOMYCIN 114 MILLIGRAM(S): 500 INJECTION, POWDER, LYOPHILIZED, FOR SOLUTION INTRAVENOUS at 17:31

## 2020-01-01 RX ADMIN — CEFEPIME 100 MILLIGRAM(S): 1 INJECTION, POWDER, FOR SOLUTION INTRAMUSCULAR; INTRAVENOUS at 16:06

## 2020-01-01 RX ADMIN — Medication 50 MILLILITER(S): at 02:34

## 2020-01-01 RX ADMIN — Medication 40 MILLIEQUIVALENT(S): at 11:15

## 2020-01-01 RX ADMIN — ZINC SULFATE TAB 220 MG (50 MG ZINC EQUIVALENT) 220 MILLIGRAM(S): 220 (50 ZN) TAB at 08:42

## 2020-01-01 RX ADMIN — NYSTATIN CREAM 1 APPLICATION(S): 100000 CREAM TOPICAL at 17:30

## 2020-01-01 RX ADMIN — CEFEPIME 100 MILLIGRAM(S): 1 INJECTION, POWDER, FOR SOLUTION INTRAMUSCULAR; INTRAVENOUS at 18:33

## 2020-01-01 RX ADMIN — MEROPENEM 100 MILLIGRAM(S): 1 INJECTION INTRAVENOUS at 21:55

## 2020-01-01 RX ADMIN — HALOPERIDOL DECANOATE 1 MILLIGRAM(S): 100 INJECTION INTRAMUSCULAR at 12:01

## 2020-01-01 RX ADMIN — Medication 50 MILLIGRAM(S): at 06:46

## 2020-01-01 RX ADMIN — Medication 40 MILLIEQUIVALENT(S): at 12:35

## 2020-01-01 RX ADMIN — MIDODRINE HYDROCHLORIDE 10 MILLIGRAM(S): 2.5 TABLET ORAL at 20:48

## 2020-01-01 RX ADMIN — POTASSIUM PHOSPHATE, MONOBASIC POTASSIUM PHOSPHATE, DIBASIC 62.5 MILLIMOLE(S): 236; 224 INJECTION, SOLUTION INTRAVENOUS at 20:07

## 2020-01-01 RX ADMIN — Medication 500 MILLIGRAM(S): at 16:51

## 2020-01-01 RX ADMIN — Medication 650 MILLIGRAM(S): at 07:01

## 2020-01-01 RX ADMIN — CHLORHEXIDINE GLUCONATE 15 MILLILITER(S): 213 SOLUTION TOPICAL at 05:19

## 2020-01-01 RX ADMIN — ETOMIDATE 17 MILLIGRAM(S): 2 INJECTION INTRAVENOUS at 14:11

## 2020-01-01 RX ADMIN — NYSTATIN CREAM 1 APPLICATION(S): 100000 CREAM TOPICAL at 05:41

## 2020-01-01 RX ADMIN — PANTOPRAZOLE SODIUM 40 MILLIGRAM(S): 20 TABLET, DELAYED RELEASE ORAL at 05:51

## 2020-01-01 RX ADMIN — PANTOPRAZOLE SODIUM 40 MILLIGRAM(S): 20 TABLET, DELAYED RELEASE ORAL at 17:08

## 2020-01-01 RX ADMIN — Medication 40 MILLIEQUIVALENT(S): at 11:06

## 2020-01-01 RX ADMIN — Medication 40 MILLIGRAM(S): at 13:03

## 2020-01-01 RX ADMIN — PANTOPRAZOLE SODIUM 40 MILLIGRAM(S): 20 TABLET, DELAYED RELEASE ORAL at 11:35

## 2020-01-01 RX ADMIN — CHLORHEXIDINE GLUCONATE 1 APPLICATION(S): 213 SOLUTION TOPICAL at 11:10

## 2020-01-01 RX ADMIN — Medication 650 MILLIGRAM(S): at 06:15

## 2020-01-01 RX ADMIN — MUPIROCIN 1 APPLICATION(S): 20 OINTMENT TOPICAL at 17:30

## 2020-01-01 RX ADMIN — Medication 100 MILLIGRAM(S): at 13:39

## 2020-01-01 RX ADMIN — DAPTOMYCIN 114 MILLIGRAM(S): 500 INJECTION, POWDER, LYOPHILIZED, FOR SOLUTION INTRAVENOUS at 17:22

## 2020-01-01 RX ADMIN — CHLORHEXIDINE GLUCONATE 1 APPLICATION(S): 213 SOLUTION TOPICAL at 09:20

## 2020-01-01 RX ADMIN — Medication 325 MILLIGRAM(S): at 15:36

## 2020-01-01 RX ADMIN — FLUCONAZOLE 200 MILLIGRAM(S): 150 TABLET ORAL at 11:40

## 2020-01-01 RX ADMIN — NYSTATIN CREAM 1 APPLICATION(S): 100000 CREAM TOPICAL at 05:30

## 2020-01-01 RX ADMIN — CHLORHEXIDINE GLUCONATE 1 APPLICATION(S): 213 SOLUTION TOPICAL at 12:29

## 2020-01-01 RX ADMIN — Medication 20 MILLIGRAM(S): at 05:38

## 2020-01-01 RX ADMIN — Medication 250 MILLIGRAM(S): at 18:04

## 2020-01-01 RX ADMIN — CHLORHEXIDINE GLUCONATE 1 APPLICATION(S): 213 SOLUTION TOPICAL at 13:10

## 2020-01-01 RX ADMIN — FLUCONAZOLE 100 MILLIGRAM(S): 150 TABLET ORAL at 17:28

## 2020-01-01 RX ADMIN — Medication 500 MILLIGRAM(S): at 09:35

## 2020-01-01 RX ADMIN — Medication 500 MILLIGRAM(S): at 12:39

## 2020-01-01 RX ADMIN — Medication 100 MILLIGRAM(S): at 21:58

## 2020-01-01 RX ADMIN — PANTOPRAZOLE SODIUM 40 MILLIGRAM(S): 20 TABLET, DELAYED RELEASE ORAL at 11:43

## 2020-01-01 RX ADMIN — Medication 500 MILLIGRAM(S): at 17:44

## 2020-01-01 RX ADMIN — Medication 20 MILLIGRAM(S): at 05:20

## 2020-01-01 RX ADMIN — LACTULOSE 30 GRAM(S): 10 SOLUTION ORAL at 22:02

## 2020-01-01 RX ADMIN — NYSTATIN CREAM 1 APPLICATION(S): 100000 CREAM TOPICAL at 18:50

## 2020-01-01 RX ADMIN — POLYETHYLENE GLYCOL 3350 17 GRAM(S): 17 POWDER, FOR SOLUTION ORAL at 13:11

## 2020-01-01 RX ADMIN — Medication 50 MILLILITER(S): at 15:18

## 2020-01-01 RX ADMIN — Medication 500 MILLIGRAM(S): at 07:45

## 2020-01-01 RX ADMIN — PANTOPRAZOLE SODIUM 40 MILLIGRAM(S): 20 TABLET, DELAYED RELEASE ORAL at 06:20

## 2020-01-01 RX ADMIN — Medication 40 MILLIGRAM(S): at 14:19

## 2020-01-01 RX ADMIN — Medication 1 TABLET(S): at 12:18

## 2020-01-01 RX ADMIN — Medication 40 MILLIEQUIVALENT(S): at 08:13

## 2020-01-01 RX ADMIN — NYSTATIN CREAM 1 APPLICATION(S): 100000 CREAM TOPICAL at 05:42

## 2020-01-01 RX ADMIN — NYSTATIN CREAM 1 APPLICATION(S): 100000 CREAM TOPICAL at 17:12

## 2020-01-01 RX ADMIN — LINEZOLID 600 MILLIGRAM(S): 600 INJECTION, SOLUTION INTRAVENOUS at 05:13

## 2020-01-01 RX ADMIN — Medication 500 MILLIGRAM(S): at 08:40

## 2020-01-01 RX ADMIN — MUPIROCIN 1 APPLICATION(S): 20 OINTMENT TOPICAL at 17:20

## 2020-01-01 RX ADMIN — FLUCONAZOLE 100 MILLIGRAM(S): 150 TABLET ORAL at 18:33

## 2020-01-01 RX ADMIN — Medication 500 MILLIGRAM(S): at 05:56

## 2020-01-01 RX ADMIN — FLUCONAZOLE 100 MILLIGRAM(S): 150 TABLET ORAL at 17:24

## 2020-01-01 RX ADMIN — Medication 50 MILLILITER(S): at 16:52

## 2020-01-01 RX ADMIN — NYSTATIN CREAM 1 APPLICATION(S): 100000 CREAM TOPICAL at 18:17

## 2020-01-01 RX ADMIN — Medication 20 MILLIGRAM(S): at 06:10

## 2020-01-01 RX ADMIN — MUPIROCIN 1 APPLICATION(S): 20 OINTMENT TOPICAL at 06:19

## 2020-01-01 RX ADMIN — Medication 102 MILLIGRAM(S): at 11:55

## 2020-01-01 RX ADMIN — Medication 50 MILLILITER(S): at 18:20

## 2020-01-01 RX ADMIN — Medication 500 MILLIGRAM(S): at 11:38

## 2020-01-01 RX ADMIN — MIDODRINE HYDROCHLORIDE 10 MILLIGRAM(S): 2.5 TABLET ORAL at 05:20

## 2020-01-01 RX ADMIN — Medication 650 MILLIGRAM(S): at 22:30

## 2020-01-01 RX ADMIN — LACTULOSE 10 GRAM(S): 10 SOLUTION ORAL at 11:45

## 2020-01-01 RX ADMIN — Medication 2.5 MILLIGRAM(S): at 11:10

## 2020-01-01 RX ADMIN — POTASSIUM PHOSPHATE, MONOBASIC POTASSIUM PHOSPHATE, DIBASIC 62.5 MILLIMOLE(S): 236; 224 INJECTION, SOLUTION INTRAVENOUS at 11:46

## 2020-01-01 RX ADMIN — ZINC SULFATE TAB 220 MG (50 MG ZINC EQUIVALENT) 220 MILLIGRAM(S): 220 (50 ZN) TAB at 12:08

## 2020-01-01 RX ADMIN — LINEZOLID 600 MILLIGRAM(S): 600 INJECTION, SOLUTION INTRAVENOUS at 18:03

## 2020-01-01 RX ADMIN — MEROPENEM 100 MILLIGRAM(S): 1 INJECTION INTRAVENOUS at 05:55

## 2020-01-01 RX ADMIN — PANTOPRAZOLE SODIUM 40 MILLIGRAM(S): 20 TABLET, DELAYED RELEASE ORAL at 11:39

## 2020-01-01 RX ADMIN — Medication 100 MILLIGRAM(S): at 22:39

## 2020-01-01 RX ADMIN — Medication 20 MILLIGRAM(S): at 00:23

## 2020-01-01 RX ADMIN — RIVAROXABAN 20 MILLIGRAM(S): KIT at 17:37

## 2020-01-01 RX ADMIN — NYSTATIN CREAM 1 APPLICATION(S): 100000 CREAM TOPICAL at 17:58

## 2020-01-01 RX ADMIN — Medication 500 MILLIGRAM(S): at 05:20

## 2020-01-01 RX ADMIN — FLUCONAZOLE 100 MILLIGRAM(S): 150 TABLET ORAL at 17:44

## 2020-01-01 RX ADMIN — PANTOPRAZOLE SODIUM 40 MILLIGRAM(S): 20 TABLET, DELAYED RELEASE ORAL at 06:46

## 2020-01-01 RX ADMIN — Medication 50 MILLILITER(S): at 00:10

## 2020-01-01 RX ADMIN — ENOXAPARIN SODIUM 50 MILLIGRAM(S): 100 INJECTION SUBCUTANEOUS at 12:31

## 2020-01-01 RX ADMIN — Medication 50 MILLILITER(S): at 11:18

## 2020-01-01 RX ADMIN — ENOXAPARIN SODIUM 50 MILLIGRAM(S): 100 INJECTION SUBCUTANEOUS at 12:03

## 2020-01-01 RX ADMIN — ENOXAPARIN SODIUM 50 MILLIGRAM(S): 100 INJECTION SUBCUTANEOUS at 11:35

## 2020-01-01 RX ADMIN — MIDODRINE HYDROCHLORIDE 10 MILLIGRAM(S): 2.5 TABLET ORAL at 17:07

## 2020-01-01 RX ADMIN — PANTOPRAZOLE SODIUM 40 MILLIGRAM(S): 20 TABLET, DELAYED RELEASE ORAL at 06:04

## 2020-01-01 RX ADMIN — RIVAROXABAN 15 MILLIGRAM(S): KIT at 12:06

## 2020-01-01 RX ADMIN — Medication 50 MILLILITER(S): at 22:11

## 2020-01-01 RX ADMIN — Medication 40 MILLIEQUIVALENT(S): at 08:58

## 2020-01-01 RX ADMIN — PANTOPRAZOLE SODIUM 40 MILLIGRAM(S): 20 TABLET, DELAYED RELEASE ORAL at 12:21

## 2020-01-01 RX ADMIN — LINEZOLID 600 MILLIGRAM(S): 600 INJECTION, SOLUTION INTRAVENOUS at 14:41

## 2020-01-01 RX ADMIN — CHLORHEXIDINE GLUCONATE 1 APPLICATION(S): 213 SOLUTION TOPICAL at 12:00

## 2020-01-01 RX ADMIN — PANTOPRAZOLE SODIUM 40 MILLIGRAM(S): 20 TABLET, DELAYED RELEASE ORAL at 14:01

## 2020-01-01 RX ADMIN — PANTOPRAZOLE SODIUM 40 MILLIGRAM(S): 20 TABLET, DELAYED RELEASE ORAL at 12:18

## 2020-01-01 RX ADMIN — LINEZOLID 600 MILLIGRAM(S): 600 INJECTION, SOLUTION INTRAVENOUS at 05:15

## 2020-01-01 RX ADMIN — NYSTATIN CREAM 1 APPLICATION(S): 100000 CREAM TOPICAL at 06:01

## 2020-01-01 RX ADMIN — MEROPENEM 100 MILLIGRAM(S): 1 INJECTION INTRAVENOUS at 22:05

## 2020-01-01 RX ADMIN — MIDODRINE HYDROCHLORIDE 10 MILLIGRAM(S): 2.5 TABLET ORAL at 06:51

## 2020-01-01 RX ADMIN — CHLORHEXIDINE GLUCONATE 15 MILLILITER(S): 213 SOLUTION TOPICAL at 17:17

## 2020-01-01 RX ADMIN — Medication 50 MILLILITER(S): at 12:06

## 2020-01-01 RX ADMIN — PANTOPRAZOLE SODIUM 40 MILLIGRAM(S): 20 TABLET, DELAYED RELEASE ORAL at 05:41

## 2020-01-01 RX ADMIN — Medication 50 MILLILITER(S): at 18:29

## 2020-01-01 RX ADMIN — Medication 80 MILLIGRAM(S): at 05:45

## 2020-01-01 RX ADMIN — RIVAROXABAN 20 MILLIGRAM(S): KIT at 17:22

## 2020-01-01 RX ADMIN — CHLORHEXIDINE GLUCONATE 1 APPLICATION(S): 213 SOLUTION TOPICAL at 12:02

## 2020-01-01 RX ADMIN — HEPARIN SODIUM 5000 UNIT(S): 5000 INJECTION INTRAVENOUS; SUBCUTANEOUS at 05:41

## 2020-01-01 RX ADMIN — Medication 500 MILLIGRAM(S): at 17:25

## 2020-01-01 RX ADMIN — LACTULOSE 30 GRAM(S): 10 SOLUTION ORAL at 11:28

## 2020-01-01 RX ADMIN — Medication 40 MILLIEQUIVALENT(S): at 09:39

## 2020-01-01 RX ADMIN — CEFEPIME 100 MILLIGRAM(S): 1 INJECTION, POWDER, FOR SOLUTION INTRAMUSCULAR; INTRAVENOUS at 13:15

## 2020-01-01 RX ADMIN — MIDODRINE HYDROCHLORIDE 10 MILLIGRAM(S): 2.5 TABLET ORAL at 17:35

## 2020-01-01 RX ADMIN — FENTANYL CITRATE 5.62 MICROGRAM(S)/KG/HR: 50 INJECTION INTRAVENOUS at 16:38

## 2020-01-01 RX ADMIN — Medication 100 MILLIGRAM(S): at 22:58

## 2020-01-01 RX ADMIN — RIVAROXABAN 15 MILLIGRAM(S): KIT at 19:04

## 2020-01-01 RX ADMIN — ENOXAPARIN SODIUM 50 MILLIGRAM(S): 100 INJECTION SUBCUTANEOUS at 11:28

## 2020-01-01 RX ADMIN — Medication 1000 MILLIGRAM(S): at 16:12

## 2020-01-01 RX ADMIN — NYSTATIN CREAM 1 APPLICATION(S): 100000 CREAM TOPICAL at 06:00

## 2020-01-01 RX ADMIN — ENOXAPARIN SODIUM 60 MILLIGRAM(S): 100 INJECTION SUBCUTANEOUS at 05:15

## 2020-01-01 RX ADMIN — NYSTATIN CREAM 1 APPLICATION(S): 100000 CREAM TOPICAL at 17:27

## 2020-01-01 RX ADMIN — PANTOPRAZOLE SODIUM 40 MILLIGRAM(S): 20 TABLET, DELAYED RELEASE ORAL at 11:54

## 2020-01-01 RX ADMIN — PANTOPRAZOLE SODIUM 40 MILLIGRAM(S): 20 TABLET, DELAYED RELEASE ORAL at 06:13

## 2020-01-01 RX ADMIN — CHLORHEXIDINE GLUCONATE 1 APPLICATION(S): 213 SOLUTION TOPICAL at 12:30

## 2020-01-01 RX ADMIN — Medication 40 MILLIEQUIVALENT(S): at 14:01

## 2020-01-01 RX ADMIN — Medication 1 TABLET(S): at 11:28

## 2020-01-01 RX ADMIN — MUPIROCIN 1 APPLICATION(S): 20 OINTMENT TOPICAL at 18:11

## 2020-01-01 RX ADMIN — NYSTATIN CREAM 1 APPLICATION(S): 100000 CREAM TOPICAL at 05:40

## 2020-01-01 RX ADMIN — Medication 100 MILLIEQUIVALENT(S): at 12:01

## 2020-01-01 RX ADMIN — FLUCONAZOLE 100 MILLIGRAM(S): 150 TABLET ORAL at 16:47

## 2020-01-01 RX ADMIN — ENOXAPARIN SODIUM 50 MILLIGRAM(S): 100 INJECTION SUBCUTANEOUS at 12:14

## 2020-01-01 RX ADMIN — MIDODRINE HYDROCHLORIDE 10 MILLIGRAM(S): 2.5 TABLET ORAL at 17:55

## 2020-01-01 RX ADMIN — CHLORHEXIDINE GLUCONATE 1 APPLICATION(S): 213 SOLUTION TOPICAL at 12:20

## 2020-01-01 RX ADMIN — DEXMEDETOMIDINE HYDROCHLORIDE IN 0.9% SODIUM CHLORIDE 1.41 MICROGRAM(S)/KG/HR: 4 INJECTION INTRAVENOUS at 06:01

## 2020-01-01 RX ADMIN — Medication 250 MILLIGRAM(S): at 17:22

## 2020-01-01 RX ADMIN — PANTOPRAZOLE SODIUM 40 MILLIGRAM(S): 20 TABLET, DELAYED RELEASE ORAL at 11:41

## 2020-01-01 RX ADMIN — PANTOPRAZOLE SODIUM 40 MILLIGRAM(S): 20 TABLET, DELAYED RELEASE ORAL at 06:27

## 2020-01-01 RX ADMIN — ZINC SULFATE TAB 220 MG (50 MG ZINC EQUIVALENT) 220 MILLIGRAM(S): 220 (50 ZN) TAB at 11:29

## 2020-01-01 RX ADMIN — Medication 500 MILLIGRAM(S): at 08:37

## 2020-01-01 RX ADMIN — CHLORHEXIDINE GLUCONATE 15 MILLILITER(S): 213 SOLUTION TOPICAL at 05:12

## 2020-01-01 RX ADMIN — Medication 100 MILLIEQUIVALENT(S): at 12:24

## 2020-01-01 RX ADMIN — PANTOPRAZOLE SODIUM 40 MILLIGRAM(S): 20 TABLET, DELAYED RELEASE ORAL at 17:04

## 2020-01-01 RX ADMIN — SODIUM CHLORIDE 10 MILLILITER(S): 9 INJECTION INTRAMUSCULAR; INTRAVENOUS; SUBCUTANEOUS at 22:46

## 2020-01-01 RX ADMIN — NYSTATIN CREAM 1 APPLICATION(S): 100000 CREAM TOPICAL at 05:15

## 2020-01-01 RX ADMIN — NYSTATIN CREAM 1 APPLICATION(S): 100000 CREAM TOPICAL at 17:16

## 2020-01-01 RX ADMIN — MEROPENEM 100 MILLIGRAM(S): 1 INJECTION INTRAVENOUS at 13:33

## 2020-01-01 RX ADMIN — Medication 50 MILLILITER(S): at 06:26

## 2020-01-01 RX ADMIN — Medication 650 MILLIGRAM(S): at 22:00

## 2020-01-01 RX ADMIN — Medication 102 MILLIGRAM(S): at 15:19

## 2020-01-01 RX ADMIN — CHLORHEXIDINE GLUCONATE 15 MILLILITER(S): 213 SOLUTION TOPICAL at 18:19

## 2020-01-01 RX ADMIN — Medication 100 MILLIEQUIVALENT(S): at 12:20

## 2020-01-01 RX ADMIN — LACTULOSE 20 GRAM(S): 10 SOLUTION ORAL at 17:13

## 2020-01-01 RX ADMIN — MUPIROCIN 1 APPLICATION(S): 20 OINTMENT TOPICAL at 17:17

## 2020-01-01 RX ADMIN — Medication 20 MILLIGRAM(S): at 06:00

## 2020-01-01 RX ADMIN — Medication 100 MILLIGRAM(S): at 05:33

## 2020-01-01 RX ADMIN — NYSTATIN CREAM 1 APPLICATION(S): 100000 CREAM TOPICAL at 06:18

## 2020-01-01 RX ADMIN — PANTOPRAZOLE SODIUM 40 MILLIGRAM(S): 20 TABLET, DELAYED RELEASE ORAL at 12:24

## 2020-01-01 RX ADMIN — LACTULOSE 10 GRAM(S): 10 SOLUTION ORAL at 12:22

## 2020-01-01 RX ADMIN — PANTOPRAZOLE SODIUM 40 MILLIGRAM(S): 20 TABLET, DELAYED RELEASE ORAL at 05:30

## 2020-01-01 RX ADMIN — SODIUM CHLORIDE 1000 MILLILITER(S): 9 INJECTION INTRAMUSCULAR; INTRAVENOUS; SUBCUTANEOUS at 20:15

## 2020-01-01 RX ADMIN — FLUCONAZOLE 100 MILLIGRAM(S): 150 TABLET ORAL at 17:43

## 2020-01-01 RX ADMIN — Medication 40 MILLIEQUIVALENT(S): at 04:36

## 2020-01-01 RX ADMIN — Medication 20 MILLIGRAM(S): at 05:03

## 2020-01-01 RX ADMIN — Medication 102 MILLIGRAM(S): at 14:12

## 2020-01-01 RX ADMIN — Medication 50 MILLIGRAM(S): at 23:19

## 2020-01-01 RX ADMIN — NYSTATIN CREAM 1 APPLICATION(S): 100000 CREAM TOPICAL at 17:36

## 2020-01-01 RX ADMIN — Medication 650 MILLIGRAM(S): at 19:02

## 2020-01-01 RX ADMIN — NYSTATIN CREAM 1 APPLICATION(S): 100000 CREAM TOPICAL at 18:34

## 2020-01-01 RX ADMIN — CHLORHEXIDINE GLUCONATE 1 APPLICATION(S): 213 SOLUTION TOPICAL at 12:05

## 2020-01-01 RX ADMIN — Medication 1 TABLET(S): at 15:36

## 2020-01-01 RX ADMIN — LINEZOLID 600 MILLIGRAM(S): 600 INJECTION, SOLUTION INTRAVENOUS at 17:52

## 2020-01-01 RX ADMIN — Medication 20 MILLIGRAM(S): at 06:17

## 2020-01-01 RX ADMIN — SENNA PLUS 2 TABLET(S): 8.6 TABLET ORAL at 22:12

## 2020-01-01 RX ADMIN — POLYETHYLENE GLYCOL 3350 17 GRAM(S): 17 POWDER, FOR SOLUTION ORAL at 12:44

## 2020-01-01 RX ADMIN — FLUCONAZOLE 100 MILLIGRAM(S): 150 TABLET ORAL at 17:25

## 2020-01-01 RX ADMIN — ZINC SULFATE TAB 220 MG (50 MG ZINC EQUIVALENT) 220 MILLIGRAM(S): 220 (50 ZN) TAB at 12:39

## 2020-01-01 RX ADMIN — Medication 2.63 MICROGRAM(S)/KG/MIN: at 20:49

## 2020-01-01 RX ADMIN — MIDODRINE HYDROCHLORIDE 10 MILLIGRAM(S): 2.5 TABLET ORAL at 12:24

## 2020-01-01 RX ADMIN — CHLORHEXIDINE GLUCONATE 1 APPLICATION(S): 213 SOLUTION TOPICAL at 12:45

## 2020-01-01 RX ADMIN — ENOXAPARIN SODIUM 60 MILLIGRAM(S): 100 INJECTION SUBCUTANEOUS at 17:07

## 2020-01-01 RX ADMIN — POLYETHYLENE GLYCOL 3350 17 GRAM(S): 17 POWDER, FOR SOLUTION ORAL at 11:33

## 2020-01-01 RX ADMIN — Medication 40 MILLIGRAM(S): at 16:28

## 2020-01-01 RX ADMIN — NYSTATIN CREAM 1 APPLICATION(S): 100000 CREAM TOPICAL at 17:21

## 2020-01-01 RX ADMIN — NYSTATIN CREAM 1 APPLICATION(S): 100000 CREAM TOPICAL at 17:41

## 2020-01-01 RX ADMIN — LACTULOSE 30 GRAM(S): 10 SOLUTION ORAL at 12:45

## 2020-01-01 RX ADMIN — MIDODRINE HYDROCHLORIDE 10 MILLIGRAM(S): 2.5 TABLET ORAL at 05:42

## 2020-01-01 RX ADMIN — MEROPENEM 100 MILLIGRAM(S): 1 INJECTION INTRAVENOUS at 14:04

## 2020-01-01 RX ADMIN — Medication 1 TABLET(S): at 11:41

## 2020-01-01 RX ADMIN — LACTULOSE 20 GRAM(S): 10 SOLUTION ORAL at 13:58

## 2020-01-01 RX ADMIN — Medication 100 MILLIGRAM(S): at 16:45

## 2020-01-01 RX ADMIN — MORPHINE SULFATE 4 MILLIGRAM(S): 50 CAPSULE, EXTENDED RELEASE ORAL at 14:11

## 2020-01-01 RX ADMIN — NYSTATIN CREAM 1 APPLICATION(S): 100000 CREAM TOPICAL at 17:22

## 2020-01-01 RX ADMIN — LACTULOSE 30 GRAM(S): 10 SOLUTION ORAL at 11:33

## 2020-01-01 RX ADMIN — Medication 650 MILLIGRAM(S): at 01:30

## 2020-01-01 RX ADMIN — Medication 100 MILLIEQUIVALENT(S): at 16:47

## 2020-01-01 RX ADMIN — Medication 50 MILLILITER(S): at 16:09

## 2020-01-01 RX ADMIN — PANTOPRAZOLE SODIUM 40 MILLIGRAM(S): 20 TABLET, DELAYED RELEASE ORAL at 06:38

## 2020-01-01 RX ADMIN — CEFEPIME 100 MILLIGRAM(S): 1 INJECTION, POWDER, FOR SOLUTION INTRAMUSCULAR; INTRAVENOUS at 21:14

## 2020-01-01 RX ADMIN — Medication 500 MILLIGRAM(S): at 08:02

## 2020-01-01 RX ADMIN — MIDODRINE HYDROCHLORIDE 10 MILLIGRAM(S): 2.5 TABLET ORAL at 18:15

## 2020-01-01 RX ADMIN — Medication 1 TABLET(S): at 12:06

## 2020-01-01 RX ADMIN — MIDODRINE HYDROCHLORIDE 10 MILLIGRAM(S): 2.5 TABLET ORAL at 05:27

## 2020-01-01 RX ADMIN — Medication 50 MILLILITER(S): at 00:11

## 2020-01-01 RX ADMIN — Medication 500 MILLIGRAM(S): at 08:53

## 2020-01-01 RX ADMIN — HEPARIN SODIUM 5000 UNIT(S): 5000 INJECTION INTRAVENOUS; SUBCUTANEOUS at 05:21

## 2020-01-01 RX ADMIN — Medication 1 APPLICATION(S): at 17:04

## 2020-01-01 RX ADMIN — ENOXAPARIN SODIUM 50 MILLIGRAM(S): 100 INJECTION SUBCUTANEOUS at 12:33

## 2020-01-01 RX ADMIN — CHLORHEXIDINE GLUCONATE 1 APPLICATION(S): 213 SOLUTION TOPICAL at 15:47

## 2020-01-01 RX ADMIN — Medication 100 MILLIEQUIVALENT(S): at 18:45

## 2020-01-01 RX ADMIN — PANTOPRAZOLE SODIUM 40 MILLIGRAM(S): 20 TABLET, DELAYED RELEASE ORAL at 19:15

## 2020-01-01 RX ADMIN — Medication 100 MILLIEQUIVALENT(S): at 15:21

## 2020-01-01 RX ADMIN — PANTOPRAZOLE SODIUM 40 MILLIGRAM(S): 20 TABLET, DELAYED RELEASE ORAL at 05:07

## 2020-01-01 RX ADMIN — Medication 1 TABLET(S): at 12:31

## 2020-01-01 RX ADMIN — Medication 650 MILLIGRAM(S): at 22:40

## 2020-01-01 RX ADMIN — MUPIROCIN 1 APPLICATION(S): 20 OINTMENT TOPICAL at 17:47

## 2020-01-01 RX ADMIN — NYSTATIN CREAM 1 APPLICATION(S): 100000 CREAM TOPICAL at 06:58

## 2020-01-01 RX ADMIN — CEFEPIME 100 MILLIGRAM(S): 1 INJECTION, POWDER, FOR SOLUTION INTRAMUSCULAR; INTRAVENOUS at 13:58

## 2020-01-01 RX ADMIN — Medication 50 MILLILITER(S): at 08:45

## 2020-01-01 RX ADMIN — Medication 1 PACKET(S): at 06:03

## 2020-01-01 RX ADMIN — PANTOPRAZOLE SODIUM 40 MILLIGRAM(S): 20 TABLET, DELAYED RELEASE ORAL at 05:55

## 2020-01-01 RX ADMIN — Medication 250 MILLIGRAM(S): at 21:55

## 2020-01-01 RX ADMIN — Medication 50 MILLILITER(S): at 01:29

## 2020-01-01 RX ADMIN — Medication 50 MILLILITER(S): at 23:09

## 2020-01-01 RX ADMIN — NYSTATIN CREAM 1 APPLICATION(S): 100000 CREAM TOPICAL at 05:23

## 2020-01-01 RX ADMIN — ENOXAPARIN SODIUM 50 MILLIGRAM(S): 100 INJECTION SUBCUTANEOUS at 11:46

## 2020-01-01 RX ADMIN — CHLORHEXIDINE GLUCONATE 1 APPLICATION(S): 213 SOLUTION TOPICAL at 11:39

## 2020-01-01 RX ADMIN — Medication 100 MILLIEQUIVALENT(S): at 08:17

## 2020-01-01 RX ADMIN — ZINC SULFATE TAB 220 MG (50 MG ZINC EQUIVALENT) 220 MILLIGRAM(S): 220 (50 ZN) TAB at 12:22

## 2020-01-01 RX ADMIN — ALBUTEROL 2 PUFF(S): 90 AEROSOL, METERED ORAL at 05:38

## 2020-01-01 RX ADMIN — LACTULOSE 30 GRAM(S): 10 SOLUTION ORAL at 23:28

## 2020-01-01 RX ADMIN — Medication 1 TABLET(S): at 11:45

## 2020-01-01 RX ADMIN — NYSTATIN CREAM 1 APPLICATION(S): 100000 CREAM TOPICAL at 08:25

## 2020-01-01 RX ADMIN — Medication 100 MILLIGRAM(S): at 06:19

## 2020-01-01 RX ADMIN — Medication 50 MILLILITER(S): at 05:05

## 2020-01-01 RX ADMIN — NYSTATIN CREAM 1 APPLICATION(S): 100000 CREAM TOPICAL at 05:48

## 2020-01-01 RX ADMIN — Medication 20 MILLIGRAM(S): at 05:30

## 2020-01-01 RX ADMIN — Medication 500 MILLIGRAM(S): at 17:21

## 2020-01-01 RX ADMIN — LACTULOSE 30 GRAM(S): 10 SOLUTION ORAL at 05:50

## 2020-01-01 RX ADMIN — Medication 100 MILLIGRAM(S): at 05:28

## 2020-01-01 RX ADMIN — FLUCONAZOLE 100 MILLIGRAM(S): 150 TABLET ORAL at 18:12

## 2020-01-01 RX ADMIN — MIDODRINE HYDROCHLORIDE 10 MILLIGRAM(S): 2.5 TABLET ORAL at 11:33

## 2020-01-01 RX ADMIN — Medication 50 MILLILITER(S): at 17:55

## 2020-01-01 RX ADMIN — NYSTATIN CREAM 1 APPLICATION(S): 100000 CREAM TOPICAL at 05:16

## 2020-01-01 RX ADMIN — MIDODRINE HYDROCHLORIDE 10 MILLIGRAM(S): 2.5 TABLET ORAL at 14:01

## 2020-01-01 RX ADMIN — PANTOPRAZOLE SODIUM 40 MILLIGRAM(S): 20 TABLET, DELAYED RELEASE ORAL at 12:09

## 2020-01-01 RX ADMIN — CHLORHEXIDINE GLUCONATE 1 APPLICATION(S): 213 SOLUTION TOPICAL at 11:56

## 2020-01-01 RX ADMIN — Medication 1 TABLET(S): at 12:41

## 2020-01-01 RX ADMIN — Medication 100 MILLIEQUIVALENT(S): at 15:25

## 2020-01-01 RX ADMIN — CHLORHEXIDINE GLUCONATE 15 MILLILITER(S): 213 SOLUTION TOPICAL at 05:50

## 2020-01-01 RX ADMIN — LINEZOLID 600 MILLIGRAM(S): 600 INJECTION, SOLUTION INTRAVENOUS at 17:07

## 2020-01-01 RX ADMIN — Medication 1 TABLET(S): at 11:17

## 2020-01-01 RX ADMIN — MIDODRINE HYDROCHLORIDE 10 MILLIGRAM(S): 2.5 TABLET ORAL at 05:04

## 2020-01-01 RX ADMIN — CEFEPIME 100 MILLIGRAM(S): 1 INJECTION, POWDER, FOR SOLUTION INTRAMUSCULAR; INTRAVENOUS at 05:28

## 2020-01-01 RX ADMIN — Medication 500 MILLIGRAM(S): at 18:12

## 2020-01-01 RX ADMIN — ENOXAPARIN SODIUM 50 MILLIGRAM(S): 100 INJECTION SUBCUTANEOUS at 11:36

## 2020-01-01 RX ADMIN — NYSTATIN CREAM 1 APPLICATION(S): 100000 CREAM TOPICAL at 05:19

## 2020-01-01 RX ADMIN — Medication 1 TABLET(S): at 12:21

## 2020-01-01 RX ADMIN — Medication 102 MILLIGRAM(S): at 22:19

## 2020-01-01 RX ADMIN — MIDODRINE HYDROCHLORIDE 10 MILLIGRAM(S): 2.5 TABLET ORAL at 18:36

## 2020-01-01 RX ADMIN — Medication 50 MILLIEQUIVALENT(S): at 14:44

## 2020-01-01 RX ADMIN — NYSTATIN CREAM 1 APPLICATION(S): 100000 CREAM TOPICAL at 17:45

## 2020-01-01 RX ADMIN — MEROPENEM 100 MILLIGRAM(S): 1 INJECTION INTRAVENOUS at 21:36

## 2020-01-01 RX ADMIN — ZINC SULFATE TAB 220 MG (50 MG ZINC EQUIVALENT) 220 MILLIGRAM(S): 220 (50 ZN) TAB at 11:38

## 2020-01-01 RX ADMIN — MIDODRINE HYDROCHLORIDE 10 MILLIGRAM(S): 2.5 TABLET ORAL at 11:00

## 2020-01-01 RX ADMIN — MIDODRINE HYDROCHLORIDE 10 MILLIGRAM(S): 2.5 TABLET ORAL at 18:02

## 2020-01-01 RX ADMIN — MIDODRINE HYDROCHLORIDE 10 MILLIGRAM(S): 2.5 TABLET ORAL at 23:16

## 2020-01-01 RX ADMIN — Medication 1 TABLET(S): at 11:54

## 2020-01-01 RX ADMIN — ENOXAPARIN SODIUM 50 MILLIGRAM(S): 100 INJECTION SUBCUTANEOUS at 11:25

## 2020-01-01 RX ADMIN — Medication 102 MILLIGRAM(S): at 13:46

## 2020-01-01 RX ADMIN — Medication 50 MILLIGRAM(S): at 12:00

## 2020-01-01 RX ADMIN — CHLORHEXIDINE GLUCONATE 15 MILLILITER(S): 213 SOLUTION TOPICAL at 08:25

## 2020-01-01 RX ADMIN — HEPARIN SODIUM 5000 UNIT(S): 5000 INJECTION INTRAVENOUS; SUBCUTANEOUS at 17:14

## 2020-01-01 RX ADMIN — Medication 20 MILLIEQUIVALENT(S): at 12:22

## 2020-01-01 RX ADMIN — ENOXAPARIN SODIUM 60 MILLIGRAM(S): 100 INJECTION SUBCUTANEOUS at 18:26

## 2020-01-01 RX ADMIN — HEPARIN SODIUM 5000 UNIT(S): 5000 INJECTION INTRAVENOUS; SUBCUTANEOUS at 05:07

## 2020-01-01 RX ADMIN — Medication 102 MILLIGRAM(S): at 21:35

## 2020-01-01 RX ADMIN — PANTOPRAZOLE SODIUM 40 MILLIGRAM(S): 20 TABLET, DELAYED RELEASE ORAL at 18:19

## 2020-01-01 RX ADMIN — FLUCONAZOLE 100 MILLIGRAM(S): 150 TABLET ORAL at 17:13

## 2020-01-01 RX ADMIN — MUPIROCIN 1 APPLICATION(S): 20 OINTMENT TOPICAL at 05:48

## 2020-01-01 RX ADMIN — LACTULOSE 20 GRAM(S): 10 SOLUTION ORAL at 17:18

## 2020-01-01 RX ADMIN — Medication 1 TABLET(S): at 11:38

## 2020-01-01 RX ADMIN — NYSTATIN CREAM 1 APPLICATION(S): 100000 CREAM TOPICAL at 17:33

## 2020-01-01 RX ADMIN — Medication 20 MILLIGRAM(S): at 05:12

## 2020-01-01 RX ADMIN — MEROPENEM 100 MILLIGRAM(S): 1 INJECTION INTRAVENOUS at 21:15

## 2020-01-01 RX ADMIN — RIVAROXABAN 20 MILLIGRAM(S): KIT at 17:12

## 2020-01-01 RX ADMIN — Medication 650 MILLIGRAM(S): at 21:15

## 2020-01-01 RX ADMIN — Medication 40 MILLIEQUIVALENT(S): at 06:29

## 2020-01-01 RX ADMIN — FLUCONAZOLE 100 MILLIGRAM(S): 150 TABLET ORAL at 17:17

## 2020-01-01 RX ADMIN — Medication 100 MILLIGRAM(S): at 05:27

## 2020-01-01 RX ADMIN — Medication 80 MILLIGRAM(S): at 12:23

## 2020-01-01 RX ADMIN — Medication 40 MILLIGRAM(S): at 18:08

## 2020-01-01 RX ADMIN — NYSTATIN CREAM 1 APPLICATION(S): 100000 CREAM TOPICAL at 05:10

## 2020-01-01 RX ADMIN — Medication 20 MILLIGRAM(S): at 05:47

## 2020-01-01 RX ADMIN — PANTOPRAZOLE SODIUM 40 MILLIGRAM(S): 20 TABLET, DELAYED RELEASE ORAL at 06:03

## 2020-01-01 RX ADMIN — CHLORHEXIDINE GLUCONATE 1 APPLICATION(S): 213 SOLUTION TOPICAL at 11:29

## 2020-01-01 RX ADMIN — FLUCONAZOLE 200 MILLIGRAM(S): 150 TABLET ORAL at 12:03

## 2020-01-01 RX ADMIN — NYSTATIN CREAM 1 APPLICATION(S): 100000 CREAM TOPICAL at 10:56

## 2020-01-01 RX ADMIN — Medication 50 MILLILITER(S): at 11:06

## 2020-01-01 RX ADMIN — NYSTATIN CREAM 1 APPLICATION(S): 100000 CREAM TOPICAL at 18:12

## 2020-01-01 RX ADMIN — DEXTROSE MONOHYDRATE, SODIUM CHLORIDE, AND POTASSIUM CHLORIDE 50 MILLILITER(S): 50; .745; 4.5 INJECTION, SOLUTION INTRAVENOUS at 11:26

## 2020-01-01 RX ADMIN — ENOXAPARIN SODIUM 50 MILLIGRAM(S): 100 INJECTION SUBCUTANEOUS at 11:23

## 2020-01-01 RX ADMIN — MEROPENEM 100 MILLIGRAM(S): 1 INJECTION INTRAVENOUS at 22:00

## 2020-01-01 RX ADMIN — FLUCONAZOLE 200 MILLIGRAM(S): 150 TABLET ORAL at 11:46

## 2020-01-01 RX ADMIN — FLUCONAZOLE 200 MILLIGRAM(S): 150 TABLET ORAL at 12:29

## 2020-01-01 RX ADMIN — Medication 500 MILLIGRAM(S): at 11:54

## 2020-01-01 RX ADMIN — NYSTATIN CREAM 1 APPLICATION(S): 100000 CREAM TOPICAL at 17:52

## 2020-01-01 RX ADMIN — Medication 80 MILLIGRAM(S): at 17:34

## 2020-01-01 RX ADMIN — HEPARIN SODIUM 5000 UNIT(S): 5000 INJECTION INTRAVENOUS; SUBCUTANEOUS at 19:16

## 2020-01-01 RX ADMIN — CHLORHEXIDINE GLUCONATE 15 MILLILITER(S): 213 SOLUTION TOPICAL at 17:19

## 2020-01-01 RX ADMIN — Medication 2.63 MICROGRAM(S)/KG/MIN: at 18:18

## 2020-01-01 RX ADMIN — MEROPENEM 100 MILLIGRAM(S): 1 INJECTION INTRAVENOUS at 14:33

## 2020-01-01 RX ADMIN — MUPIROCIN 1 APPLICATION(S): 20 OINTMENT TOPICAL at 17:02

## 2020-01-01 RX ADMIN — HEPARIN SODIUM 5000 UNIT(S): 5000 INJECTION INTRAVENOUS; SUBCUTANEOUS at 17:25

## 2020-01-01 RX ADMIN — ONDANSETRON 4 MILLIGRAM(S): 8 TABLET, FILM COATED ORAL at 13:39

## 2020-01-01 RX ADMIN — Medication 650 MILLIGRAM(S): at 22:55

## 2020-01-01 RX ADMIN — ENOXAPARIN SODIUM 60 MILLIGRAM(S): 100 INJECTION SUBCUTANEOUS at 17:41

## 2020-01-01 RX ADMIN — MIDODRINE HYDROCHLORIDE 10 MILLIGRAM(S): 2.5 TABLET ORAL at 05:25

## 2020-01-01 RX ADMIN — LACTULOSE 20 GRAM(S): 10 SOLUTION ORAL at 18:30

## 2020-01-01 RX ADMIN — PANTOPRAZOLE SODIUM 40 MILLIGRAM(S): 20 TABLET, DELAYED RELEASE ORAL at 07:05

## 2020-01-01 RX ADMIN — Medication 50 MILLILITER(S): at 00:13

## 2020-01-01 RX ADMIN — Medication 80 MILLIGRAM(S): at 18:05

## 2020-01-01 RX ADMIN — FLUCONAZOLE 100 MILLIGRAM(S): 150 TABLET ORAL at 17:27

## 2020-01-01 RX ADMIN — SODIUM CHLORIDE 2000 MILLILITER(S): 9 INJECTION, SOLUTION INTRAVENOUS at 18:52

## 2020-01-01 RX ADMIN — LACTULOSE 30 GRAM(S): 10 SOLUTION ORAL at 18:01

## 2020-01-01 RX ADMIN — MEROPENEM 100 MILLIGRAM(S): 1 INJECTION INTRAVENOUS at 05:47

## 2020-01-01 RX ADMIN — Medication 80 MILLIGRAM(S): at 17:21

## 2020-01-01 RX ADMIN — MEROPENEM 100 MILLIGRAM(S): 1 INJECTION INTRAVENOUS at 21:53

## 2020-01-01 RX ADMIN — SODIUM CHLORIDE 1000 MILLILITER(S): 9 INJECTION INTRAMUSCULAR; INTRAVENOUS; SUBCUTANEOUS at 14:22

## 2020-01-01 RX ADMIN — Medication 100 MILLIEQUIVALENT(S): at 08:24

## 2020-01-01 RX ADMIN — CHLORHEXIDINE GLUCONATE 1 APPLICATION(S): 213 SOLUTION TOPICAL at 07:51

## 2020-01-01 RX ADMIN — Medication 500 MILLIGRAM(S): at 22:11

## 2020-01-01 RX ADMIN — SODIUM CHLORIDE 50 MILLILITER(S): 9 INJECTION, SOLUTION INTRAVENOUS at 00:00

## 2020-01-01 RX ADMIN — DEXTROSE MONOHYDRATE, SODIUM CHLORIDE, AND POTASSIUM CHLORIDE 50 MILLILITER(S): 50; .745; 4.5 INJECTION, SOLUTION INTRAVENOUS at 17:44

## 2020-01-01 RX ADMIN — Medication 325 MILLIGRAM(S): at 12:55

## 2020-01-01 RX ADMIN — ALBUTEROL 2 PUFF(S): 90 AEROSOL, METERED ORAL at 21:55

## 2020-01-01 RX ADMIN — Medication 500 MILLIGRAM(S): at 06:10

## 2020-01-01 RX ADMIN — Medication 5 MILLIGRAM(S): at 10:35

## 2020-01-01 RX ADMIN — CHLORHEXIDINE GLUCONATE 15 MILLILITER(S): 213 SOLUTION TOPICAL at 08:04

## 2020-01-01 RX ADMIN — ZINC SULFATE TAB 220 MG (50 MG ZINC EQUIVALENT) 220 MILLIGRAM(S): 220 (50 ZN) TAB at 11:17

## 2020-01-01 RX ADMIN — MIDODRINE HYDROCHLORIDE 10 MILLIGRAM(S): 2.5 TABLET ORAL at 05:12

## 2020-01-01 RX ADMIN — LACTULOSE 30 GRAM(S): 10 SOLUTION ORAL at 17:14

## 2020-01-01 RX ADMIN — ZINC SULFATE TAB 220 MG (50 MG ZINC EQUIVALENT) 220 MILLIGRAM(S): 220 (50 ZN) TAB at 11:43

## 2020-01-01 RX ADMIN — CHLORHEXIDINE GLUCONATE 15 MILLILITER(S): 213 SOLUTION TOPICAL at 06:05

## 2020-01-01 RX ADMIN — Medication 40 MILLIGRAM(S): at 05:18

## 2020-01-01 RX ADMIN — MUPIROCIN 1 APPLICATION(S): 20 OINTMENT TOPICAL at 06:14

## 2020-01-01 RX ADMIN — Medication 50 MILLILITER(S): at 23:20

## 2020-01-01 RX ADMIN — CHLORHEXIDINE GLUCONATE 15 MILLILITER(S): 213 SOLUTION TOPICAL at 18:16

## 2020-01-01 RX ADMIN — CEFEPIME 100 MILLIGRAM(S): 1 INJECTION, POWDER, FOR SOLUTION INTRAMUSCULAR; INTRAVENOUS at 13:32

## 2020-01-01 RX ADMIN — ENOXAPARIN SODIUM 50 MILLIGRAM(S): 100 INJECTION SUBCUTANEOUS at 12:37

## 2020-01-01 RX ADMIN — Medication 1 TABLET(S): at 11:43

## 2020-01-01 RX ADMIN — Medication 650 MILLIGRAM(S): at 07:57

## 2020-01-01 RX ADMIN — MIDODRINE HYDROCHLORIDE 10 MILLIGRAM(S): 2.5 TABLET ORAL at 05:21

## 2020-01-01 RX ADMIN — FLUCONAZOLE 100 MILLIGRAM(S): 150 TABLET ORAL at 18:58

## 2020-01-01 RX ADMIN — Medication 1 PACKET(S): at 14:38

## 2020-01-01 RX ADMIN — DAPTOMYCIN 114 MILLIGRAM(S): 500 INJECTION, POWDER, LYOPHILIZED, FOR SOLUTION INTRAVENOUS at 17:27

## 2020-01-01 RX ADMIN — LINEZOLID 600 MILLIGRAM(S): 600 INJECTION, SOLUTION INTRAVENOUS at 05:40

## 2020-01-01 RX ADMIN — Medication 50 MILLILITER(S): at 12:51

## 2020-01-01 RX ADMIN — LACTULOSE 20 GRAM(S): 10 SOLUTION ORAL at 17:15

## 2020-01-01 RX ADMIN — LACTULOSE 30 GRAM(S): 10 SOLUTION ORAL at 11:16

## 2020-01-01 RX ADMIN — ZINC SULFATE TAB 220 MG (50 MG ZINC EQUIVALENT) 220 MILLIGRAM(S): 220 (50 ZN) TAB at 08:33

## 2020-01-01 RX ADMIN — MUPIROCIN 1 APPLICATION(S): 20 OINTMENT TOPICAL at 18:54

## 2020-01-01 RX ADMIN — FLUCONAZOLE 200 MILLIGRAM(S): 150 TABLET ORAL at 12:25

## 2020-01-01 RX ADMIN — CHLORHEXIDINE GLUCONATE 1 APPLICATION(S): 213 SOLUTION TOPICAL at 08:34

## 2020-01-01 RX ADMIN — SODIUM CHLORIDE 1000 MILLILITER(S): 9 INJECTION INTRAMUSCULAR; INTRAVENOUS; SUBCUTANEOUS at 01:20

## 2020-01-01 RX ADMIN — PANTOPRAZOLE SODIUM 40 MILLIGRAM(S): 20 TABLET, DELAYED RELEASE ORAL at 06:45

## 2020-01-01 RX ADMIN — LINEZOLID 600 MILLIGRAM(S): 600 INJECTION, SOLUTION INTRAVENOUS at 05:03

## 2020-01-01 RX ADMIN — NYSTATIN CREAM 1 APPLICATION(S): 100000 CREAM TOPICAL at 05:46

## 2020-01-01 RX ADMIN — Medication 20 MILLIGRAM(S): at 08:31

## 2020-01-01 RX ADMIN — ZINC SULFATE TAB 220 MG (50 MG ZINC EQUIVALENT) 220 MILLIGRAM(S): 220 (50 ZN) TAB at 11:26

## 2020-01-01 RX ADMIN — FENTANYL CITRATE 5.62 MICROGRAM(S)/KG/HR: 50 INJECTION INTRAVENOUS at 17:10

## 2020-01-01 RX ADMIN — Medication 2.63 MICROGRAM(S)/KG/MIN: at 17:25

## 2020-01-01 RX ADMIN — MUPIROCIN 1 APPLICATION(S): 20 OINTMENT TOPICAL at 17:54

## 2020-01-01 RX ADMIN — ZINC SULFATE TAB 220 MG (50 MG ZINC EQUIVALENT) 220 MILLIGRAM(S): 220 (50 ZN) TAB at 11:42

## 2020-01-01 RX ADMIN — Medication 40 MILLIGRAM(S): at 17:22

## 2020-01-01 RX ADMIN — Medication 500 MILLIGRAM(S): at 12:31

## 2020-01-01 RX ADMIN — Medication 100 MILLIEQUIVALENT(S): at 13:40

## 2020-01-01 RX ADMIN — CHLORHEXIDINE GLUCONATE 15 MILLILITER(S): 213 SOLUTION TOPICAL at 05:41

## 2020-01-01 RX ADMIN — CEFEPIME 100 MILLIGRAM(S): 1 INJECTION, POWDER, FOR SOLUTION INTRAMUSCULAR; INTRAVENOUS at 22:05

## 2020-01-01 RX ADMIN — MIDODRINE HYDROCHLORIDE 10 MILLIGRAM(S): 2.5 TABLET ORAL at 11:09

## 2020-01-01 RX ADMIN — Medication 500 MILLIGRAM(S): at 08:22

## 2020-01-01 RX ADMIN — HEPARIN SODIUM 5000 UNIT(S): 5000 INJECTION INTRAVENOUS; SUBCUTANEOUS at 18:36

## 2020-01-01 RX ADMIN — MUPIROCIN 1 APPLICATION(S): 20 OINTMENT TOPICAL at 17:08

## 2020-01-01 RX ADMIN — LACTULOSE 20 GRAM(S): 10 SOLUTION ORAL at 23:34

## 2020-01-01 RX ADMIN — MUPIROCIN 1 APPLICATION(S): 20 OINTMENT TOPICAL at 05:13

## 2020-01-01 RX ADMIN — Medication 1 TABLET(S): at 11:22

## 2020-01-01 RX ADMIN — DEXMEDETOMIDINE HYDROCHLORIDE IN 0.9% SODIUM CHLORIDE 2.81 MICROGRAM(S)/KG/HR: 4 INJECTION INTRAVENOUS at 08:51

## 2020-01-01 RX ADMIN — Medication 50 GRAM(S): at 08:58

## 2020-01-01 RX ADMIN — SENNA PLUS 2 TABLET(S): 8.6 TABLET ORAL at 23:35

## 2020-01-01 RX ADMIN — MIDODRINE HYDROCHLORIDE 10 MILLIGRAM(S): 2.5 TABLET ORAL at 12:28

## 2020-01-01 RX ADMIN — NYSTATIN CREAM 1 APPLICATION(S): 100000 CREAM TOPICAL at 17:24

## 2020-01-01 RX ADMIN — Medication 650 MILLIGRAM(S): at 16:27

## 2020-01-01 RX ADMIN — PANTOPRAZOLE SODIUM 40 MILLIGRAM(S): 20 TABLET, DELAYED RELEASE ORAL at 06:31

## 2020-01-01 RX ADMIN — LACTULOSE 30 GRAM(S): 10 SOLUTION ORAL at 12:22

## 2020-01-01 RX ADMIN — MIDODRINE HYDROCHLORIDE 10 MILLIGRAM(S): 2.5 TABLET ORAL at 16:10

## 2020-01-01 RX ADMIN — Medication 500 MILLIGRAM(S): at 13:31

## 2020-01-01 RX ADMIN — Medication 500 MILLIGRAM(S): at 12:36

## 2020-01-01 RX ADMIN — Medication 40 MILLIEQUIVALENT(S): at 00:49

## 2020-01-01 RX ADMIN — Medication 100 MILLIEQUIVALENT(S): at 10:40

## 2020-01-01 RX ADMIN — Medication 50 MILLILITER(S): at 12:44

## 2020-01-01 RX ADMIN — Medication 20 MILLIGRAM(S): at 06:01

## 2020-01-01 RX ADMIN — Medication 650 MILLIGRAM(S): at 00:16

## 2020-01-01 RX ADMIN — PANTOPRAZOLE SODIUM 40 MILLIGRAM(S): 20 TABLET, DELAYED RELEASE ORAL at 18:06

## 2020-01-01 RX ADMIN — DAPTOMYCIN 114 MILLIGRAM(S): 500 INJECTION, POWDER, LYOPHILIZED, FOR SOLUTION INTRAVENOUS at 17:47

## 2020-01-01 RX ADMIN — RIVAROXABAN 20 MILLIGRAM(S): KIT at 18:17

## 2020-01-01 RX ADMIN — Medication 40 MILLIGRAM(S): at 12:29

## 2020-01-01 RX ADMIN — MEROPENEM 100 MILLIGRAM(S): 1 INJECTION INTRAVENOUS at 13:40

## 2020-01-01 RX ADMIN — Medication 40 MILLIEQUIVALENT(S): at 14:03

## 2020-01-01 RX ADMIN — PANTOPRAZOLE SODIUM 40 MILLIGRAM(S): 20 TABLET, DELAYED RELEASE ORAL at 06:16

## 2020-01-01 RX ADMIN — DAPTOMYCIN 114 MILLIGRAM(S): 500 INJECTION, POWDER, LYOPHILIZED, FOR SOLUTION INTRAVENOUS at 17:18

## 2020-01-01 RX ADMIN — SENNA PLUS 2 TABLET(S): 8.6 TABLET ORAL at 22:17

## 2020-01-01 RX ADMIN — Medication 650 MILLIGRAM(S): at 15:57

## 2020-01-01 RX ADMIN — PANTOPRAZOLE SODIUM 40 MILLIGRAM(S): 20 TABLET, DELAYED RELEASE ORAL at 17:13

## 2020-01-01 RX ADMIN — MEROPENEM 100 MILLIGRAM(S): 1 INJECTION INTRAVENOUS at 05:16

## 2020-01-01 RX ADMIN — Medication 1 TABLET(S): at 12:08

## 2020-01-01 RX ADMIN — HEPARIN SODIUM 5000 UNIT(S): 5000 INJECTION INTRAVENOUS; SUBCUTANEOUS at 17:27

## 2020-01-01 RX ADMIN — LACTULOSE 10 GRAM(S): 10 SOLUTION ORAL at 11:41

## 2020-01-01 RX ADMIN — HEPARIN SODIUM 5000 UNIT(S): 5000 INJECTION INTRAVENOUS; SUBCUTANEOUS at 17:20

## 2020-01-01 RX ADMIN — NYSTATIN CREAM 1 APPLICATION(S): 100000 CREAM TOPICAL at 17:34

## 2020-01-01 RX ADMIN — ENOXAPARIN SODIUM 50 MILLIGRAM(S): 100 INJECTION SUBCUTANEOUS at 11:42

## 2020-01-01 RX ADMIN — HEPARIN SODIUM 5000 UNIT(S): 5000 INJECTION INTRAVENOUS; SUBCUTANEOUS at 17:42

## 2020-01-01 RX ADMIN — PANTOPRAZOLE SODIUM 40 MILLIGRAM(S): 20 TABLET, DELAYED RELEASE ORAL at 05:40

## 2020-01-01 RX ADMIN — LACTULOSE 10 GRAM(S): 10 SOLUTION ORAL at 11:56

## 2020-01-01 RX ADMIN — LACTULOSE 30 GRAM(S): 10 SOLUTION ORAL at 06:09

## 2020-01-01 RX ADMIN — PANTOPRAZOLE SODIUM 40 MILLIGRAM(S): 20 TABLET, DELAYED RELEASE ORAL at 17:38

## 2020-01-01 RX ADMIN — ENOXAPARIN SODIUM 60 MILLIGRAM(S): 100 INJECTION SUBCUTANEOUS at 06:14

## 2020-01-01 RX ADMIN — PANTOPRAZOLE SODIUM 40 MILLIGRAM(S): 20 TABLET, DELAYED RELEASE ORAL at 12:53

## 2020-01-01 RX ADMIN — DAPTOMYCIN 350 MILLIGRAM(S): 500 INJECTION, POWDER, LYOPHILIZED, FOR SOLUTION INTRAVENOUS at 18:51

## 2020-01-01 RX ADMIN — NYSTATIN CREAM 1 APPLICATION(S): 100000 CREAM TOPICAL at 06:45

## 2020-01-01 RX ADMIN — CHLORHEXIDINE GLUCONATE 1 APPLICATION(S): 213 SOLUTION TOPICAL at 09:38

## 2020-01-01 RX ADMIN — POTASSIUM PHOSPHATE, MONOBASIC POTASSIUM PHOSPHATE, DIBASIC 62.5 MILLIMOLE(S): 236; 224 INJECTION, SOLUTION INTRAVENOUS at 11:49

## 2020-01-01 RX ADMIN — MEROPENEM 100 MILLIGRAM(S): 1 INJECTION INTRAVENOUS at 21:21

## 2020-01-01 RX ADMIN — PANTOPRAZOLE SODIUM 40 MILLIGRAM(S): 20 TABLET, DELAYED RELEASE ORAL at 17:52

## 2020-01-01 RX ADMIN — CHLORHEXIDINE GLUCONATE 1 APPLICATION(S): 213 SOLUTION TOPICAL at 11:33

## 2020-01-01 RX ADMIN — NYSTATIN CREAM 1 APPLICATION(S): 100000 CREAM TOPICAL at 05:12

## 2020-01-01 RX ADMIN — ENOXAPARIN SODIUM 50 MILLIGRAM(S): 100 INJECTION SUBCUTANEOUS at 12:21

## 2020-01-01 RX ADMIN — Medication 100 MILLIGRAM(S): at 13:31

## 2020-01-01 RX ADMIN — LINEZOLID 600 MILLIGRAM(S): 600 INJECTION, SOLUTION INTRAVENOUS at 17:12

## 2020-01-01 RX ADMIN — PANTOPRAZOLE SODIUM 40 MILLIGRAM(S): 20 TABLET, DELAYED RELEASE ORAL at 05:27

## 2020-01-01 RX ADMIN — ENOXAPARIN SODIUM 50 MILLIGRAM(S): 100 INJECTION SUBCUTANEOUS at 11:54

## 2020-01-01 RX ADMIN — Medication 500 MILLIGRAM(S): at 09:19

## 2020-01-01 RX ADMIN — CHLORHEXIDINE GLUCONATE 1 APPLICATION(S): 213 SOLUTION TOPICAL at 11:24

## 2020-01-01 RX ADMIN — MUPIROCIN 1 APPLICATION(S): 20 OINTMENT TOPICAL at 06:00

## 2020-01-01 RX ADMIN — NYSTATIN CREAM 1 APPLICATION(S): 100000 CREAM TOPICAL at 05:09

## 2020-01-01 RX ADMIN — CHLORHEXIDINE GLUCONATE 1 APPLICATION(S): 213 SOLUTION TOPICAL at 11:17

## 2020-01-01 RX ADMIN — PANTOPRAZOLE SODIUM 40 MILLIGRAM(S): 20 TABLET, DELAYED RELEASE ORAL at 05:22

## 2020-01-01 RX ADMIN — LACTULOSE 10 GRAM(S): 10 SOLUTION ORAL at 14:00

## 2020-01-01 RX ADMIN — MEROPENEM 100 MILLIGRAM(S): 1 INJECTION INTRAVENOUS at 22:02

## 2020-01-01 RX ADMIN — NYSTATIN CREAM 1 APPLICATION(S): 100000 CREAM TOPICAL at 18:23

## 2020-01-01 RX ADMIN — PANTOPRAZOLE SODIUM 40 MILLIGRAM(S): 20 TABLET, DELAYED RELEASE ORAL at 12:28

## 2020-01-01 RX ADMIN — Medication 200 MILLIGRAM(S): at 05:55

## 2020-01-01 RX ADMIN — Medication 1 TABLET(S): at 11:26

## 2020-01-01 RX ADMIN — Medication 500 MILLIGRAM(S): at 17:12

## 2020-01-01 RX ADMIN — LACTULOSE 30 GRAM(S): 10 SOLUTION ORAL at 13:10

## 2020-01-01 RX ADMIN — MIDODRINE HYDROCHLORIDE 10 MILLIGRAM(S): 2.5 TABLET ORAL at 06:01

## 2020-01-01 RX ADMIN — Medication 100 MILLIEQUIVALENT(S): at 14:21

## 2020-01-01 RX ADMIN — Medication 500 MILLIGRAM(S): at 17:28

## 2020-01-01 RX ADMIN — MIDODRINE HYDROCHLORIDE 10 MILLIGRAM(S): 2.5 TABLET ORAL at 12:23

## 2020-01-01 RX ADMIN — FLUCONAZOLE 200 MILLIGRAM(S): 150 TABLET ORAL at 11:52

## 2020-01-01 RX ADMIN — MIDODRINE HYDROCHLORIDE 10 MILLIGRAM(S): 2.5 TABLET ORAL at 12:32

## 2020-01-01 RX ADMIN — Medication 500 MILLIGRAM(S): at 17:53

## 2020-01-01 RX ADMIN — LACTULOSE 10 GRAM(S): 10 SOLUTION ORAL at 12:36

## 2020-01-01 RX ADMIN — NYSTATIN CREAM 1 APPLICATION(S): 100000 CREAM TOPICAL at 18:21

## 2020-01-01 RX ADMIN — Medication 650 MILLIGRAM(S): at 06:21

## 2020-01-01 RX ADMIN — Medication 100 MILLIEQUIVALENT(S): at 12:25

## 2020-01-01 RX ADMIN — Medication 80 MILLIGRAM(S): at 06:27

## 2020-01-01 RX ADMIN — PANTOPRAZOLE SODIUM 40 MILLIGRAM(S): 20 TABLET, DELAYED RELEASE ORAL at 06:57

## 2020-01-01 RX ADMIN — LACTULOSE 10 GRAM(S): 10 SOLUTION ORAL at 12:28

## 2020-01-01 RX ADMIN — PANTOPRAZOLE SODIUM 40 MILLIGRAM(S): 20 TABLET, DELAYED RELEASE ORAL at 18:27

## 2020-01-01 RX ADMIN — ZINC SULFATE TAB 220 MG (50 MG ZINC EQUIVALENT) 220 MILLIGRAM(S): 220 (50 ZN) TAB at 11:46

## 2020-01-01 RX ADMIN — NYSTATIN CREAM 1 APPLICATION(S): 100000 CREAM TOPICAL at 18:18

## 2020-01-01 RX ADMIN — Medication 40 MILLIEQUIVALENT(S): at 23:34

## 2020-01-01 RX ADMIN — FENTANYL CITRATE 5.62 MICROGRAM(S)/KG/HR: 50 INJECTION INTRAVENOUS at 07:04

## 2020-01-01 RX ADMIN — NYSTATIN CREAM 1 APPLICATION(S): 100000 CREAM TOPICAL at 17:19

## 2020-01-01 RX ADMIN — SODIUM CHLORIDE 1000 MILLILITER(S): 9 INJECTION INTRAMUSCULAR; INTRAVENOUS; SUBCUTANEOUS at 18:09

## 2020-01-01 RX ADMIN — SODIUM CHLORIDE 150 MILLILITER(S): 9 INJECTION INTRAMUSCULAR; INTRAVENOUS; SUBCUTANEOUS at 16:40

## 2020-01-01 RX ADMIN — Medication 20 MILLIGRAM(S): at 07:02

## 2020-01-01 RX ADMIN — CHLORHEXIDINE GLUCONATE 1 APPLICATION(S): 213 SOLUTION TOPICAL at 11:42

## 2020-01-01 RX ADMIN — Medication 250 MILLIGRAM(S): at 05:17

## 2020-01-01 RX ADMIN — Medication 100 MILLIGRAM(S): at 13:15

## 2020-01-01 RX ADMIN — PANTOPRAZOLE SODIUM 40 MILLIGRAM(S): 20 TABLET, DELAYED RELEASE ORAL at 18:07

## 2020-01-01 RX ADMIN — CEFTRIAXONE 100 MILLIGRAM(S): 500 INJECTION, POWDER, FOR SOLUTION INTRAMUSCULAR; INTRAVENOUS at 07:57

## 2020-01-01 RX ADMIN — NYSTATIN CREAM 1 APPLICATION(S): 100000 CREAM TOPICAL at 08:36

## 2020-01-01 RX ADMIN — HEPARIN SODIUM 5000 UNIT(S): 5000 INJECTION INTRAVENOUS; SUBCUTANEOUS at 17:57

## 2020-01-01 RX ADMIN — Medication 20 MILLIGRAM(S): at 06:09

## 2020-01-01 RX ADMIN — MEROPENEM 100 MILLIGRAM(S): 1 INJECTION INTRAVENOUS at 06:16

## 2020-01-01 RX ADMIN — LINEZOLID 600 MILLIGRAM(S): 600 INJECTION, SOLUTION INTRAVENOUS at 18:16

## 2020-01-01 RX ADMIN — POLYETHYLENE GLYCOL 3350 17 GRAM(S): 17 POWDER, FOR SOLUTION ORAL at 12:28

## 2020-01-01 RX ADMIN — Medication 40 MILLIEQUIVALENT(S): at 17:35

## 2020-01-01 RX ADMIN — MEROPENEM 100 MILLIGRAM(S): 1 INJECTION INTRAVENOUS at 14:00

## 2020-01-01 RX ADMIN — Medication 500 MILLIGRAM(S): at 17:58

## 2020-01-01 RX ADMIN — Medication 1 TABLET(S): at 12:39

## 2020-01-01 RX ADMIN — PANTOPRAZOLE SODIUM 40 MILLIGRAM(S): 20 TABLET, DELAYED RELEASE ORAL at 11:40

## 2020-01-01 RX ADMIN — Medication 650 MILLIGRAM(S): at 00:26

## 2020-01-01 RX ADMIN — ENOXAPARIN SODIUM 50 MILLIGRAM(S): 100 INJECTION SUBCUTANEOUS at 12:06

## 2020-01-01 RX ADMIN — MUPIROCIN 1 APPLICATION(S): 20 OINTMENT TOPICAL at 05:05

## 2020-01-01 RX ADMIN — Medication 80 MILLIGRAM(S): at 06:09

## 2020-01-01 RX ADMIN — DAPTOMYCIN 114 MILLIGRAM(S): 500 INJECTION, POWDER, LYOPHILIZED, FOR SOLUTION INTRAVENOUS at 18:33

## 2020-01-01 RX ADMIN — ZINC SULFATE TAB 220 MG (50 MG ZINC EQUIVALENT) 220 MILLIGRAM(S): 220 (50 ZN) TAB at 11:23

## 2020-01-01 RX ADMIN — CEFEPIME 100 MILLIGRAM(S): 1 INJECTION, POWDER, FOR SOLUTION INTRAMUSCULAR; INTRAVENOUS at 21:42

## 2020-01-01 RX ADMIN — MUPIROCIN 1 APPLICATION(S): 20 OINTMENT TOPICAL at 05:30

## 2020-01-01 RX ADMIN — Medication 500 MILLIGRAM(S): at 17:24

## 2020-01-01 RX ADMIN — ZINC SULFATE TAB 220 MG (50 MG ZINC EQUIVALENT) 220 MILLIGRAM(S): 220 (50 ZN) TAB at 12:03

## 2020-01-01 RX ADMIN — CHLORHEXIDINE GLUCONATE 15 MILLILITER(S): 213 SOLUTION TOPICAL at 05:11

## 2020-01-01 RX ADMIN — PANTOPRAZOLE SODIUM 40 MILLIGRAM(S): 20 TABLET, DELAYED RELEASE ORAL at 06:06

## 2020-01-01 RX ADMIN — HEPARIN SODIUM 5000 UNIT(S): 5000 INJECTION INTRAVENOUS; SUBCUTANEOUS at 05:24

## 2020-01-01 RX ADMIN — Medication 500 MILLIGRAM(S): at 07:57

## 2020-01-01 RX ADMIN — ZINC SULFATE TAB 220 MG (50 MG ZINC EQUIVALENT) 220 MILLIGRAM(S): 220 (50 ZN) TAB at 11:45

## 2020-01-01 RX ADMIN — DAPTOMYCIN 114 MILLIGRAM(S): 500 INJECTION, POWDER, LYOPHILIZED, FOR SOLUTION INTRAVENOUS at 17:28

## 2020-01-01 RX ADMIN — Medication 40 MILLIGRAM(S): at 22:18

## 2020-01-01 RX ADMIN — MIDODRINE HYDROCHLORIDE 10 MILLIGRAM(S): 2.5 TABLET ORAL at 12:15

## 2020-01-01 RX ADMIN — PANTOPRAZOLE SODIUM 40 MILLIGRAM(S): 20 TABLET, DELAYED RELEASE ORAL at 05:04

## 2020-01-01 RX ADMIN — Medication 500 MILLIGRAM(S): at 08:36

## 2020-01-01 RX ADMIN — ZINC SULFATE TAB 220 MG (50 MG ZINC EQUIVALENT) 220 MILLIGRAM(S): 220 (50 ZN) TAB at 12:31

## 2020-01-01 RX ADMIN — MEROPENEM 100 MILLIGRAM(S): 1 INJECTION INTRAVENOUS at 05:19

## 2020-01-01 RX ADMIN — CHLORHEXIDINE GLUCONATE 1 APPLICATION(S): 213 SOLUTION TOPICAL at 11:45

## 2020-01-01 RX ADMIN — Medication 1 MILLIGRAM(S): at 15:36

## 2020-01-01 RX ADMIN — Medication 50 MILLIGRAM(S): at 18:27

## 2020-01-01 RX ADMIN — CEFEPIME 100 MILLIGRAM(S): 1 INJECTION, POWDER, FOR SOLUTION INTRAMUSCULAR; INTRAVENOUS at 17:35

## 2020-01-01 RX ADMIN — MIDODRINE HYDROCHLORIDE 10 MILLIGRAM(S): 2.5 TABLET ORAL at 12:22

## 2020-01-01 RX ADMIN — Medication 100 MILLIGRAM(S): at 23:34

## 2020-01-01 RX ADMIN — Medication 50 MILLILITER(S): at 06:37

## 2020-01-01 RX ADMIN — MIDODRINE HYDROCHLORIDE 10 MILLIGRAM(S): 2.5 TABLET ORAL at 05:05

## 2020-01-01 RX ADMIN — Medication 20 MILLIGRAM(S): at 05:15

## 2020-01-01 RX ADMIN — NYSTATIN CREAM 1 APPLICATION(S): 100000 CREAM TOPICAL at 05:20

## 2020-01-01 RX ADMIN — PANTOPRAZOLE SODIUM 40 MILLIGRAM(S): 20 TABLET, DELAYED RELEASE ORAL at 11:45

## 2020-01-01 RX ADMIN — Medication 80 MILLIGRAM(S): at 06:16

## 2020-01-01 RX ADMIN — Medication 100 GRAM(S): at 12:47

## 2020-01-01 RX ADMIN — Medication 2.63 MICROGRAM(S)/KG/MIN: at 12:00

## 2020-01-01 RX ADMIN — CHLORHEXIDINE GLUCONATE 15 MILLILITER(S): 213 SOLUTION TOPICAL at 18:02

## 2020-01-01 RX ADMIN — PANTOPRAZOLE SODIUM 40 MILLIGRAM(S): 20 TABLET, DELAYED RELEASE ORAL at 11:37

## 2020-01-01 RX ADMIN — NYSTATIN CREAM 1 APPLICATION(S): 100000 CREAM TOPICAL at 05:51

## 2020-01-01 RX ADMIN — ENOXAPARIN SODIUM 60 MILLIGRAM(S): 100 INJECTION SUBCUTANEOUS at 17:52

## 2020-01-01 RX ADMIN — ENOXAPARIN SODIUM 50 MILLIGRAM(S): 100 INJECTION SUBCUTANEOUS at 08:33

## 2020-01-01 RX ADMIN — Medication 650 MILLIGRAM(S): at 00:42

## 2020-01-01 RX ADMIN — PANTOPRAZOLE SODIUM 40 MILLIGRAM(S): 20 TABLET, DELAYED RELEASE ORAL at 17:20

## 2020-01-01 RX ADMIN — Medication 500 MILLIGRAM(S): at 17:07

## 2020-01-01 RX ADMIN — MEROPENEM 100 MILLIGRAM(S): 1 INJECTION INTRAVENOUS at 13:47

## 2020-01-01 RX ADMIN — MIDODRINE HYDROCHLORIDE 10 MILLIGRAM(S): 2.5 TABLET ORAL at 06:39

## 2020-01-01 RX ADMIN — Medication 80 MILLIGRAM(S): at 19:21

## 2020-01-01 RX ADMIN — LACTULOSE 30 GRAM(S): 10 SOLUTION ORAL at 00:48

## 2020-01-01 RX ADMIN — PANTOPRAZOLE SODIUM 40 MILLIGRAM(S): 20 TABLET, DELAYED RELEASE ORAL at 12:04

## 2020-01-01 RX ADMIN — LACTULOSE 30 GRAM(S): 10 SOLUTION ORAL at 12:15

## 2020-01-01 RX ADMIN — Medication 20 MILLIGRAM(S): at 05:53

## 2020-01-01 RX ADMIN — CEFEPIME 100 MILLIGRAM(S): 1 INJECTION, POWDER, FOR SOLUTION INTRAMUSCULAR; INTRAVENOUS at 17:27

## 2020-01-01 RX ADMIN — PANTOPRAZOLE SODIUM 40 MILLIGRAM(S): 20 TABLET, DELAYED RELEASE ORAL at 06:32

## 2020-01-01 RX ADMIN — Medication 650 MILLIGRAM(S): at 22:49

## 2020-01-01 RX ADMIN — MUPIROCIN 1 APPLICATION(S): 20 OINTMENT TOPICAL at 05:55

## 2020-01-01 RX ADMIN — DAPTOMYCIN 114 MILLIGRAM(S): 500 INJECTION, POWDER, LYOPHILIZED, FOR SOLUTION INTRAVENOUS at 17:24

## 2020-01-01 RX ADMIN — Medication 50 MILLILITER(S): at 00:34

## 2020-01-01 RX ADMIN — PANTOPRAZOLE SODIUM 40 MILLIGRAM(S): 20 TABLET, DELAYED RELEASE ORAL at 05:08

## 2020-01-01 RX ADMIN — ENOXAPARIN SODIUM 50 MILLIGRAM(S): 100 INJECTION SUBCUTANEOUS at 12:41

## 2020-01-01 RX ADMIN — LACTULOSE 30 GRAM(S): 10 SOLUTION ORAL at 12:27

## 2020-01-01 RX ADMIN — Medication 80 MILLIGRAM(S): at 17:42

## 2020-01-01 RX ADMIN — PANTOPRAZOLE SODIUM 40 MILLIGRAM(S): 20 TABLET, DELAYED RELEASE ORAL at 18:50

## 2020-01-01 RX ADMIN — Medication 500 MILLIGRAM(S): at 08:21

## 2020-01-01 RX ADMIN — LACTULOSE 30 GRAM(S): 10 SOLUTION ORAL at 12:32

## 2020-01-01 RX ADMIN — ALBUTEROL 2 PUFF(S): 90 AEROSOL, METERED ORAL at 06:06

## 2020-01-01 RX ADMIN — PANTOPRAZOLE SODIUM 40 MILLIGRAM(S): 20 TABLET, DELAYED RELEASE ORAL at 06:09

## 2020-01-01 RX ADMIN — LACTULOSE 30 GRAM(S): 10 SOLUTION ORAL at 00:12

## 2020-01-01 RX ADMIN — Medication 2.63 MICROGRAM(S)/KG/MIN: at 16:38

## 2020-01-01 RX ADMIN — FLUCONAZOLE 100 MILLIGRAM(S): 150 TABLET ORAL at 17:54

## 2020-01-01 RX ADMIN — Medication 650 MILLIGRAM(S): at 21:42

## 2020-01-01 RX ADMIN — Medication 80 MILLIGRAM(S): at 05:21

## 2020-01-01 RX ADMIN — Medication 102 MILLIGRAM(S): at 05:20

## 2020-01-01 RX ADMIN — Medication 100 MILLIEQUIVALENT(S): at 16:46

## 2020-01-01 RX ADMIN — SENNA PLUS 2 TABLET(S): 8.6 TABLET ORAL at 21:18

## 2020-01-01 RX ADMIN — MIDODRINE HYDROCHLORIDE 10 MILLIGRAM(S): 2.5 TABLET ORAL at 17:27

## 2020-01-01 RX ADMIN — MEROPENEM 100 MILLIGRAM(S): 1 INJECTION INTRAVENOUS at 21:26

## 2020-01-01 RX ADMIN — PANTOPRAZOLE SODIUM 40 MILLIGRAM(S): 20 TABLET, DELAYED RELEASE ORAL at 05:18

## 2020-01-01 RX ADMIN — PANTOPRAZOLE SODIUM 40 MILLIGRAM(S): 20 TABLET, DELAYED RELEASE ORAL at 17:26

## 2020-01-01 RX ADMIN — POTASSIUM PHOSPHATE, MONOBASIC POTASSIUM PHOSPHATE, DIBASIC 62.5 MILLIMOLE(S): 236; 224 INJECTION, SOLUTION INTRAVENOUS at 12:18

## 2020-01-01 RX ADMIN — Medication 500 MILLIGRAM(S): at 12:21

## 2020-01-01 RX ADMIN — Medication 50 MILLILITER(S): at 18:48

## 2020-01-01 RX ADMIN — POLYETHYLENE GLYCOL 3350 17 GRAM(S): 17 POWDER, FOR SOLUTION ORAL at 13:33

## 2020-01-01 RX ADMIN — MEROPENEM 100 MILLIGRAM(S): 1 INJECTION INTRAVENOUS at 06:13

## 2020-01-01 RX ADMIN — NYSTATIN CREAM 1 APPLICATION(S): 100000 CREAM TOPICAL at 05:43

## 2020-01-01 RX ADMIN — POLYETHYLENE GLYCOL 3350 17 GRAM(S): 17 POWDER, FOR SOLUTION ORAL at 11:00

## 2020-01-01 RX ADMIN — Medication 100 GRAM(S): at 00:23

## 2020-01-01 RX ADMIN — CHLORHEXIDINE GLUCONATE 15 MILLILITER(S): 213 SOLUTION TOPICAL at 05:46

## 2020-01-01 RX ADMIN — Medication 102 MILLIGRAM(S): at 21:12

## 2020-01-01 RX ADMIN — LACTULOSE 30 GRAM(S): 10 SOLUTION ORAL at 22:06

## 2020-01-01 RX ADMIN — PANTOPRAZOLE SODIUM 40 MILLIGRAM(S): 20 TABLET, DELAYED RELEASE ORAL at 05:24

## 2020-01-01 RX ADMIN — Medication 1 TABLET(S): at 11:25

## 2020-01-01 RX ADMIN — MIDODRINE HYDROCHLORIDE 10 MILLIGRAM(S): 2.5 TABLET ORAL at 05:50

## 2020-01-01 RX ADMIN — PANTOPRAZOLE SODIUM 40 MILLIGRAM(S): 20 TABLET, DELAYED RELEASE ORAL at 05:45

## 2020-01-01 RX ADMIN — Medication 650 MILLIGRAM(S): at 02:00

## 2020-01-01 RX ADMIN — MEROPENEM 100 MILLIGRAM(S): 1 INJECTION INTRAVENOUS at 13:34

## 2020-01-01 RX ADMIN — CHLORHEXIDINE GLUCONATE 1 APPLICATION(S): 213 SOLUTION TOPICAL at 08:41

## 2020-01-01 RX ADMIN — LACTULOSE 30 GRAM(S): 10 SOLUTION ORAL at 17:18

## 2020-01-01 RX ADMIN — DAPTOMYCIN 114 MILLIGRAM(S): 500 INJECTION, POWDER, LYOPHILIZED, FOR SOLUTION INTRAVENOUS at 18:48

## 2020-01-01 RX ADMIN — ENOXAPARIN SODIUM 50 MILLIGRAM(S): 100 INJECTION SUBCUTANEOUS at 11:17

## 2020-01-01 RX ADMIN — DAPTOMYCIN 114 MILLIGRAM(S): 500 INJECTION, POWDER, LYOPHILIZED, FOR SOLUTION INTRAVENOUS at 17:43

## 2020-01-01 RX ADMIN — Medication 100 MILLIEQUIVALENT(S): at 13:56

## 2020-01-01 RX ADMIN — Medication 50 MILLILITER(S): at 17:21

## 2020-01-01 RX ADMIN — CHLORHEXIDINE GLUCONATE 15 MILLILITER(S): 213 SOLUTION TOPICAL at 05:26

## 2020-01-01 RX ADMIN — HEPARIN SODIUM 5000 UNIT(S): 5000 INJECTION INTRAVENOUS; SUBCUTANEOUS at 18:11

## 2020-01-01 RX ADMIN — MEROPENEM 100 MILLIGRAM(S): 1 INJECTION INTRAVENOUS at 14:13

## 2020-01-01 RX ADMIN — Medication 20 MILLIGRAM(S): at 05:43

## 2020-01-01 RX ADMIN — Medication 1 TABLET(S): at 08:34

## 2020-01-01 RX ADMIN — Medication 50 MILLIGRAM(S): at 05:26

## 2020-01-01 RX ADMIN — Medication 1 TABLET(S): at 12:32

## 2020-01-01 RX ADMIN — FENTANYL CITRATE 2.81 MICROGRAM(S)/KG/HR: 50 INJECTION INTRAVENOUS at 08:57

## 2020-01-01 RX ADMIN — MIDODRINE HYDROCHLORIDE 10 MILLIGRAM(S): 2.5 TABLET ORAL at 06:28

## 2020-01-01 RX ADMIN — Medication 50 MILLILITER(S): at 21:01

## 2020-01-01 RX ADMIN — Medication 650 MILLIGRAM(S): at 03:08

## 2020-01-01 RX ADMIN — LACTULOSE 30 GRAM(S): 10 SOLUTION ORAL at 17:06

## 2020-01-01 RX ADMIN — LACTULOSE 20 GRAM(S): 10 SOLUTION ORAL at 17:34

## 2020-01-01 RX ADMIN — Medication 20 MILLIGRAM(S): at 05:40

## 2020-01-01 RX ADMIN — LACTULOSE 30 GRAM(S): 10 SOLUTION ORAL at 00:01

## 2020-01-01 RX ADMIN — DEXMEDETOMIDINE HYDROCHLORIDE IN 0.9% SODIUM CHLORIDE 2.81 MICROGRAM(S)/KG/HR: 4 INJECTION INTRAVENOUS at 12:00

## 2020-01-01 RX ADMIN — PANTOPRAZOLE SODIUM 40 MILLIGRAM(S): 20 TABLET, DELAYED RELEASE ORAL at 18:23

## 2020-01-01 RX ADMIN — HEPARIN SODIUM 5000 UNIT(S): 5000 INJECTION INTRAVENOUS; SUBCUTANEOUS at 23:16

## 2020-01-01 RX ADMIN — HEPARIN SODIUM 5000 UNIT(S): 5000 INJECTION INTRAVENOUS; SUBCUTANEOUS at 05:22

## 2020-01-01 RX ADMIN — Medication 1 TABLET(S): at 12:54

## 2020-01-01 RX ADMIN — NYSTATIN CREAM 1 APPLICATION(S): 100000 CREAM TOPICAL at 18:11

## 2020-01-01 RX ADMIN — CHLORHEXIDINE GLUCONATE 1 APPLICATION(S): 213 SOLUTION TOPICAL at 11:23

## 2020-01-01 RX ADMIN — Medication 50 GRAM(S): at 22:52

## 2020-01-01 RX ADMIN — Medication 1 TABLET(S): at 12:03

## 2020-01-01 RX ADMIN — LACTULOSE 30 GRAM(S): 10 SOLUTION ORAL at 17:04

## 2020-01-01 RX ADMIN — PANTOPRAZOLE SODIUM 40 MILLIGRAM(S): 20 TABLET, DELAYED RELEASE ORAL at 08:34

## 2020-01-01 RX ADMIN — SENNA PLUS 2 TABLET(S): 8.6 TABLET ORAL at 22:08

## 2020-01-01 RX ADMIN — MUPIROCIN 1 APPLICATION(S): 20 OINTMENT TOPICAL at 18:30

## 2020-01-01 RX ADMIN — LACTULOSE 30 GRAM(S): 10 SOLUTION ORAL at 05:31

## 2020-01-01 RX ADMIN — NYSTATIN CREAM 1 APPLICATION(S): 100000 CREAM TOPICAL at 17:17

## 2020-01-01 RX ADMIN — Medication 100 GRAM(S): at 11:10

## 2020-01-01 RX ADMIN — CHLORHEXIDINE GLUCONATE 15 MILLILITER(S): 213 SOLUTION TOPICAL at 17:06

## 2020-01-01 RX ADMIN — DAPTOMYCIN 114 MILLIGRAM(S): 500 INJECTION, POWDER, LYOPHILIZED, FOR SOLUTION INTRAVENOUS at 18:15

## 2020-01-01 RX ADMIN — POLYETHYLENE GLYCOL 3350 17 GRAM(S): 17 POWDER, FOR SOLUTION ORAL at 11:14

## 2020-01-01 RX ADMIN — Medication 100 MILLIGRAM(S): at 06:22

## 2020-01-01 RX ADMIN — PANTOPRAZOLE SODIUM 40 MILLIGRAM(S): 20 TABLET, DELAYED RELEASE ORAL at 12:39

## 2020-01-01 RX ADMIN — LACTULOSE 20 GRAM(S): 10 SOLUTION ORAL at 11:20

## 2020-01-01 RX ADMIN — MIDODRINE HYDROCHLORIDE 10 MILLIGRAM(S): 2.5 TABLET ORAL at 06:06

## 2020-01-01 RX ADMIN — RIVAROXABAN 20 MILLIGRAM(S): KIT at 16:58

## 2020-01-01 RX ADMIN — HEPARIN SODIUM 5000 UNIT(S): 5000 INJECTION INTRAVENOUS; SUBCUTANEOUS at 18:23

## 2020-01-01 RX ADMIN — FLUCONAZOLE 200 MILLIGRAM(S): 150 TABLET ORAL at 14:41

## 2020-01-01 RX ADMIN — DAPTOMYCIN 114 MILLIGRAM(S): 500 INJECTION, POWDER, LYOPHILIZED, FOR SOLUTION INTRAVENOUS at 17:02

## 2020-01-01 RX ADMIN — POTASSIUM PHOSPHATE, MONOBASIC POTASSIUM PHOSPHATE, DIBASIC 62.5 MILLIMOLE(S): 236; 224 INJECTION, SOLUTION INTRAVENOUS at 09:00

## 2020-01-01 RX ADMIN — MIDODRINE HYDROCHLORIDE 10 MILLIGRAM(S): 2.5 TABLET ORAL at 05:07

## 2020-01-01 RX ADMIN — Medication 100 GRAM(S): at 09:51

## 2020-01-01 RX ADMIN — FLUCONAZOLE 100 MILLIGRAM(S): 150 TABLET ORAL at 18:51

## 2020-01-01 RX ADMIN — MIDODRINE HYDROCHLORIDE 10 MILLIGRAM(S): 2.5 TABLET ORAL at 18:11

## 2020-01-01 RX ADMIN — Medication 50 MILLILITER(S): at 22:19

## 2020-01-01 RX ADMIN — MUPIROCIN 1 APPLICATION(S): 20 OINTMENT TOPICAL at 17:26

## 2020-01-01 RX ADMIN — Medication 1 TABLET(S): at 12:29

## 2020-01-01 RX ADMIN — CEFEPIME 1000 MILLIGRAM(S): 1 INJECTION, POWDER, FOR SOLUTION INTRAMUSCULAR; INTRAVENOUS at 18:33

## 2020-01-01 RX ADMIN — CHLORHEXIDINE GLUCONATE 15 MILLILITER(S): 213 SOLUTION TOPICAL at 05:17

## 2020-01-01 RX ADMIN — LACTULOSE 200 GRAM(S): 10 SOLUTION ORAL at 22:47

## 2020-01-01 RX ADMIN — MIDODRINE HYDROCHLORIDE 10 MILLIGRAM(S): 2.5 TABLET ORAL at 06:16

## 2020-01-01 RX ADMIN — LACTULOSE 10 GRAM(S): 10 SOLUTION ORAL at 12:00

## 2020-01-01 RX ADMIN — ENOXAPARIN SODIUM 60 MILLIGRAM(S): 100 INJECTION SUBCUTANEOUS at 07:02

## 2020-01-01 RX ADMIN — SODIUM CHLORIDE 75 MILLILITER(S): 9 INJECTION, SOLUTION INTRAVENOUS at 21:56

## 2020-01-01 RX ADMIN — Medication 1 TABLET(S): at 09:11

## 2020-01-01 RX ADMIN — Medication 50 MILLILITER(S): at 05:49

## 2020-01-01 RX ADMIN — LACTULOSE 30 GRAM(S): 10 SOLUTION ORAL at 05:19

## 2020-01-01 RX ADMIN — CHLORHEXIDINE GLUCONATE 15 MILLILITER(S): 213 SOLUTION TOPICAL at 19:19

## 2020-01-01 RX ADMIN — MIDODRINE HYDROCHLORIDE 10 MILLIGRAM(S): 2.5 TABLET ORAL at 18:19

## 2020-01-01 RX ADMIN — MEROPENEM 100 MILLIGRAM(S): 1 INJECTION INTRAVENOUS at 21:30

## 2020-01-01 RX ADMIN — CHLORHEXIDINE GLUCONATE 1 APPLICATION(S): 213 SOLUTION TOPICAL at 12:33

## 2020-01-01 RX ADMIN — CHLORHEXIDINE GLUCONATE 15 MILLILITER(S): 213 SOLUTION TOPICAL at 18:05

## 2020-01-01 RX ADMIN — CHLORHEXIDINE GLUCONATE 15 MILLILITER(S): 213 SOLUTION TOPICAL at 17:59

## 2020-01-01 RX ADMIN — Medication 20 MILLIGRAM(S): at 05:56

## 2020-01-01 RX ADMIN — NYSTATIN CREAM 1 APPLICATION(S): 100000 CREAM TOPICAL at 17:25

## 2020-01-01 RX ADMIN — MIDODRINE HYDROCHLORIDE 10 MILLIGRAM(S): 2.5 TABLET ORAL at 17:21

## 2020-01-01 RX ADMIN — Medication 80 MILLIGRAM(S): at 05:41

## 2020-01-01 RX ADMIN — NYSTATIN CREAM 1 APPLICATION(S): 100000 CREAM TOPICAL at 05:29

## 2020-01-01 RX ADMIN — PANTOPRAZOLE SODIUM 40 MILLIGRAM(S): 20 TABLET, DELAYED RELEASE ORAL at 17:17

## 2020-01-01 RX ADMIN — Medication 650 MILLIGRAM(S): at 09:00

## 2020-01-01 RX ADMIN — Medication 500 MILLIGRAM(S): at 17:52

## 2020-01-01 RX ADMIN — SODIUM CHLORIDE 10 MILLILITER(S): 9 INJECTION INTRAMUSCULAR; INTRAVENOUS; SUBCUTANEOUS at 05:24

## 2020-01-01 RX ADMIN — MIDODRINE HYDROCHLORIDE 10 MILLIGRAM(S): 2.5 TABLET ORAL at 17:04

## 2020-01-01 RX ADMIN — RIVAROXABAN 20 MILLIGRAM(S): KIT at 16:54

## 2020-01-01 RX ADMIN — Medication 50 MILLILITER(S): at 05:08

## 2020-01-01 RX ADMIN — LACTULOSE 20 GRAM(S): 10 SOLUTION ORAL at 21:14

## 2020-01-01 RX ADMIN — CHLORHEXIDINE GLUCONATE 15 MILLILITER(S): 213 SOLUTION TOPICAL at 05:13

## 2020-01-01 RX ADMIN — ZINC SULFATE TAB 220 MG (50 MG ZINC EQUIVALENT) 220 MILLIGRAM(S): 220 (50 ZN) TAB at 11:22

## 2020-01-01 RX ADMIN — SODIUM CHLORIDE 1000 MILLILITER(S): 9 INJECTION INTRAMUSCULAR; INTRAVENOUS; SUBCUTANEOUS at 16:53

## 2020-01-01 RX ADMIN — MIDODRINE HYDROCHLORIDE 10 MILLIGRAM(S): 2.5 TABLET ORAL at 12:59

## 2020-01-01 RX ADMIN — Medication 500 MILLIGRAM(S): at 08:09

## 2020-01-01 RX ADMIN — ENOXAPARIN SODIUM 60 MILLIGRAM(S): 100 INJECTION SUBCUTANEOUS at 17:36

## 2020-01-01 RX ADMIN — MUPIROCIN 1 APPLICATION(S): 20 OINTMENT TOPICAL at 05:20

## 2020-01-01 RX ADMIN — Medication 500 MILLIGRAM(S): at 08:50

## 2020-01-01 RX ADMIN — NYSTATIN CREAM 1 APPLICATION(S): 100000 CREAM TOPICAL at 05:26

## 2020-01-01 RX ADMIN — Medication 20 MILLIGRAM(S): at 05:41

## 2020-01-01 RX ADMIN — MIDODRINE HYDROCHLORIDE 10 MILLIGRAM(S): 2.5 TABLET ORAL at 17:42

## 2020-01-01 RX ADMIN — Medication 80 MILLIGRAM(S): at 17:28

## 2020-01-01 RX ADMIN — Medication 50 MILLILITER(S): at 19:05

## 2020-01-01 RX ADMIN — Medication 40 MILLIEQUIVALENT(S): at 10:36

## 2020-01-01 RX ADMIN — LINEZOLID 600 MILLIGRAM(S): 600 INJECTION, SOLUTION INTRAVENOUS at 05:30

## 2020-01-01 RX ADMIN — Medication 2.63 MICROGRAM(S)/KG/MIN: at 12:46

## 2020-01-01 RX ADMIN — MIDODRINE HYDROCHLORIDE 10 MILLIGRAM(S): 2.5 TABLET ORAL at 12:25

## 2020-01-01 RX ADMIN — CEFEPIME 100 MILLIGRAM(S): 1 INJECTION, POWDER, FOR SOLUTION INTRAMUSCULAR; INTRAVENOUS at 13:08

## 2020-01-01 RX ADMIN — Medication 500 MILLIGRAM(S): at 05:29

## 2020-01-01 RX ADMIN — MIDODRINE HYDROCHLORIDE 10 MILLIGRAM(S): 2.5 TABLET ORAL at 07:26

## 2020-01-01 RX ADMIN — LACTULOSE 30 GRAM(S): 10 SOLUTION ORAL at 05:23

## 2020-01-01 RX ADMIN — SENNA PLUS 2 TABLET(S): 8.6 TABLET ORAL at 21:47

## 2020-01-01 RX ADMIN — DEXTROSE MONOHYDRATE, SODIUM CHLORIDE, AND POTASSIUM CHLORIDE 50 MILLILITER(S): 50; .745; 4.5 INJECTION, SOLUTION INTRAVENOUS at 19:00

## 2020-01-01 RX ADMIN — CEFEPIME 100 MILLIGRAM(S): 1 INJECTION, POWDER, FOR SOLUTION INTRAMUSCULAR; INTRAVENOUS at 05:33

## 2020-01-01 RX ADMIN — Medication 102 MILLIGRAM(S): at 17:59

## 2020-01-01 RX ADMIN — Medication 100 MILLIGRAM(S): at 21:53

## 2020-01-01 RX ADMIN — HEPARIN SODIUM 5000 UNIT(S): 5000 INJECTION INTRAVENOUS; SUBCUTANEOUS at 06:16

## 2020-01-01 RX ADMIN — Medication 100 MILLIEQUIVALENT(S): at 13:07

## 2020-01-01 RX ADMIN — Medication 650 MILLIGRAM(S): at 13:07

## 2020-01-01 RX ADMIN — CEFEPIME 100 MILLIGRAM(S): 1 INJECTION, POWDER, FOR SOLUTION INTRAMUSCULAR; INTRAVENOUS at 17:18

## 2020-01-01 RX ADMIN — Medication 20 MILLIGRAM(S): at 05:37

## 2020-01-01 RX ADMIN — FLUCONAZOLE 200 MILLIGRAM(S): 150 TABLET ORAL at 11:29

## 2020-01-01 RX ADMIN — Medication 1000 MILLIGRAM(S): at 21:15

## 2020-01-01 RX ADMIN — PANTOPRAZOLE SODIUM 40 MILLIGRAM(S): 20 TABLET, DELAYED RELEASE ORAL at 11:10

## 2020-01-01 RX ADMIN — PANTOPRAZOLE SODIUM 40 MILLIGRAM(S): 20 TABLET, DELAYED RELEASE ORAL at 06:00

## 2020-01-01 RX ADMIN — PANTOPRAZOLE SODIUM 40 MILLIGRAM(S): 20 TABLET, DELAYED RELEASE ORAL at 17:34

## 2020-01-01 RX ADMIN — Medication 50 MILLILITER(S): at 21:15

## 2020-01-01 RX ADMIN — LACTULOSE 30 GRAM(S): 10 SOLUTION ORAL at 12:00

## 2020-01-01 RX ADMIN — ZINC SULFATE TAB 220 MG (50 MG ZINC EQUIVALENT) 220 MILLIGRAM(S): 220 (50 ZN) TAB at 11:40

## 2020-01-01 RX ADMIN — Medication 50 MILLILITER(S): at 10:02

## 2020-01-01 RX ADMIN — LACTULOSE 20 GRAM(S): 10 SOLUTION ORAL at 05:33

## 2020-01-01 RX ADMIN — Medication 650 MILLIGRAM(S): at 12:37

## 2020-01-01 RX ADMIN — CHLORHEXIDINE GLUCONATE 15 MILLILITER(S): 213 SOLUTION TOPICAL at 17:21

## 2020-01-01 RX ADMIN — Medication 100 MILLIGRAM(S): at 22:40

## 2020-01-01 RX ADMIN — PANTOPRAZOLE SODIUM 40 MILLIGRAM(S): 20 TABLET, DELAYED RELEASE ORAL at 12:59

## 2020-01-01 RX ADMIN — MIDODRINE HYDROCHLORIDE 10 MILLIGRAM(S): 2.5 TABLET ORAL at 17:20

## 2020-01-01 RX ADMIN — MIDODRINE HYDROCHLORIDE 10 MILLIGRAM(S): 2.5 TABLET ORAL at 05:45

## 2020-01-01 RX ADMIN — CHLORHEXIDINE GLUCONATE 15 MILLILITER(S): 213 SOLUTION TOPICAL at 05:07

## 2020-01-01 RX ADMIN — Medication 102 MILLIGRAM(S): at 05:27

## 2020-01-01 RX ADMIN — PANTOPRAZOLE SODIUM 40 MILLIGRAM(S): 20 TABLET, DELAYED RELEASE ORAL at 17:15

## 2020-01-01 RX ADMIN — Medication 650 MILLIGRAM(S): at 20:38

## 2020-01-01 RX ADMIN — MIDODRINE HYDROCHLORIDE 10 MILLIGRAM(S): 2.5 TABLET ORAL at 14:53

## 2020-01-01 RX ADMIN — Medication 650 MILLIGRAM(S): at 20:24

## 2020-01-01 RX ADMIN — ZINC SULFATE TAB 220 MG (50 MG ZINC EQUIVALENT) 220 MILLIGRAM(S): 220 (50 ZN) TAB at 12:53

## 2020-01-01 RX ADMIN — Medication 50 MILLILITER(S): at 12:28

## 2020-01-01 RX ADMIN — CHLORHEXIDINE GLUCONATE 1 APPLICATION(S): 213 SOLUTION TOPICAL at 11:59

## 2020-01-01 RX ADMIN — CHLORHEXIDINE GLUCONATE 15 MILLILITER(S): 213 SOLUTION TOPICAL at 06:38

## 2020-01-01 RX ADMIN — Medication 650 MILLIGRAM(S): at 07:04

## 2020-01-01 RX ADMIN — DAPTOMYCIN 114 MILLIGRAM(S): 500 INJECTION, POWDER, LYOPHILIZED, FOR SOLUTION INTRAVENOUS at 17:44

## 2020-01-01 RX ADMIN — Medication 1 TABLET(S): at 11:39

## 2020-01-01 RX ADMIN — MEROPENEM 100 MILLIGRAM(S): 1 INJECTION INTRAVENOUS at 21:10

## 2020-01-01 RX ADMIN — Medication 500 MILLIGRAM(S): at 11:42

## 2020-01-01 RX ADMIN — Medication 500 MILLIGRAM(S): at 18:16

## 2020-01-01 RX ADMIN — MUPIROCIN 1 APPLICATION(S): 20 OINTMENT TOPICAL at 05:35

## 2020-01-01 RX ADMIN — MIDODRINE HYDROCHLORIDE 10 MILLIGRAM(S): 2.5 TABLET ORAL at 05:22

## 2020-01-01 RX ADMIN — MIDODRINE HYDROCHLORIDE 10 MILLIGRAM(S): 2.5 TABLET ORAL at 06:05

## 2020-01-01 RX ADMIN — Medication 80 MILLIGRAM(S): at 18:11

## 2020-01-01 RX ADMIN — MEROPENEM 100 MILLIGRAM(S): 1 INJECTION INTRAVENOUS at 21:49

## 2020-01-01 RX ADMIN — Medication 40 MILLIGRAM(S): at 20:01

## 2020-01-01 RX ADMIN — LACTULOSE 30 GRAM(S): 10 SOLUTION ORAL at 05:16

## 2020-01-01 RX ADMIN — CHLORHEXIDINE GLUCONATE 1 APPLICATION(S): 213 SOLUTION TOPICAL at 16:33

## 2020-01-01 RX ADMIN — DAPTOMYCIN 114 MILLIGRAM(S): 500 INJECTION, POWDER, LYOPHILIZED, FOR SOLUTION INTRAVENOUS at 18:03

## 2020-01-01 RX ADMIN — Medication 500 MILLIGRAM(S): at 17:40

## 2020-01-01 RX ADMIN — FLUCONAZOLE 100 MILLIGRAM(S): 150 TABLET ORAL at 17:41

## 2020-01-01 RX ADMIN — RIVAROXABAN 20 MILLIGRAM(S): KIT at 17:35

## 2020-01-01 RX ADMIN — DEXTROSE MONOHYDRATE, SODIUM CHLORIDE, AND POTASSIUM CHLORIDE 50 MILLILITER(S): 50; .745; 4.5 INJECTION, SOLUTION INTRAVENOUS at 16:02

## 2020-01-01 RX ADMIN — Medication 100 MILLIEQUIVALENT(S): at 10:08

## 2020-01-01 RX ADMIN — Medication 500 MILLIGRAM(S): at 08:34

## 2020-01-01 RX ADMIN — FLUCONAZOLE 100 MILLIGRAM(S): 150 TABLET ORAL at 17:22

## 2020-01-01 RX ADMIN — Medication 100 MILLIEQUIVALENT(S): at 06:35

## 2020-01-01 RX ADMIN — CHLORHEXIDINE GLUCONATE 1 APPLICATION(S): 213 SOLUTION TOPICAL at 12:04

## 2020-01-01 RX ADMIN — Medication 50 MILLILITER(S): at 05:25

## 2020-01-01 RX ADMIN — MEROPENEM 100 MILLIGRAM(S): 1 INJECTION INTRAVENOUS at 05:11

## 2020-01-01 RX ADMIN — Medication 250 MILLIGRAM(S): at 06:09

## 2020-01-01 RX ADMIN — ENOXAPARIN SODIUM 50 MILLIGRAM(S): 100 INJECTION SUBCUTANEOUS at 09:21

## 2020-01-01 RX ADMIN — MIDODRINE HYDROCHLORIDE 10 MILLIGRAM(S): 2.5 TABLET ORAL at 17:36

## 2020-01-01 RX ADMIN — PANTOPRAZOLE SODIUM 40 MILLIGRAM(S): 20 TABLET, DELAYED RELEASE ORAL at 06:18

## 2020-01-01 RX ADMIN — FLUCONAZOLE 200 MILLIGRAM(S): 150 TABLET ORAL at 11:23

## 2020-01-01 RX ADMIN — MEROPENEM 100 MILLIGRAM(S): 1 INJECTION INTRAVENOUS at 13:04

## 2020-01-01 RX ADMIN — MIDODRINE HYDROCHLORIDE 10 MILLIGRAM(S): 2.5 TABLET ORAL at 18:27

## 2020-01-01 RX ADMIN — HEPARIN SODIUM 5000 UNIT(S): 5000 INJECTION INTRAVENOUS; SUBCUTANEOUS at 06:06

## 2020-01-01 RX ADMIN — Medication 100 MILLIEQUIVALENT(S): at 14:09

## 2020-01-01 RX ADMIN — ENOXAPARIN SODIUM 50 MILLIGRAM(S): 100 INJECTION SUBCUTANEOUS at 11:22

## 2020-01-01 RX ADMIN — Medication 500 MILLIGRAM(S): at 18:02

## 2020-01-01 RX ADMIN — PANTOPRAZOLE SODIUM 40 MILLIGRAM(S): 20 TABLET, DELAYED RELEASE ORAL at 12:31

## 2020-01-01 RX ADMIN — NYSTATIN CREAM 1 APPLICATION(S): 100000 CREAM TOPICAL at 05:03

## 2020-01-01 RX ADMIN — SODIUM CHLORIDE 40 MILLILITER(S): 9 INJECTION, SOLUTION INTRAVENOUS at 21:51

## 2020-01-01 RX ADMIN — PANTOPRAZOLE SODIUM 40 MILLIGRAM(S): 20 TABLET, DELAYED RELEASE ORAL at 17:21

## 2020-01-01 RX ADMIN — Medication 1 TABLET(S): at 13:08

## 2020-01-01 RX ADMIN — CHLORHEXIDINE GLUCONATE 15 MILLILITER(S): 213 SOLUTION TOPICAL at 06:01

## 2020-01-01 RX ADMIN — ZINC SULFATE TAB 220 MG (50 MG ZINC EQUIVALENT) 220 MILLIGRAM(S): 220 (50 ZN) TAB at 11:10

## 2020-01-01 RX ADMIN — Medication 20 MILLIGRAM(S): at 16:59

## 2020-01-01 RX ADMIN — ZINC SULFATE TAB 220 MG (50 MG ZINC EQUIVALENT) 220 MILLIGRAM(S): 220 (50 ZN) TAB at 12:36

## 2020-01-01 RX ADMIN — NYSTATIN CREAM 1 APPLICATION(S): 100000 CREAM TOPICAL at 16:52

## 2020-01-01 RX ADMIN — Medication 500 MILLIGRAM(S): at 07:42

## 2020-01-01 RX ADMIN — Medication 20 MILLIGRAM(S): at 05:10

## 2020-01-01 RX ADMIN — Medication 250 MILLIGRAM(S): at 05:11

## 2020-01-01 RX ADMIN — MIDODRINE HYDROCHLORIDE 10 MILLIGRAM(S): 2.5 TABLET ORAL at 11:56

## 2020-01-01 RX ADMIN — LINEZOLID 600 MILLIGRAM(S): 600 INJECTION, SOLUTION INTRAVENOUS at 06:45

## 2020-01-01 RX ADMIN — LACTULOSE 30 GRAM(S): 10 SOLUTION ORAL at 11:05

## 2020-01-01 RX ADMIN — Medication 500 MILLIGRAM(S): at 17:16

## 2020-01-01 RX ADMIN — MORPHINE SULFATE 4 MILLIGRAM(S): 50 CAPSULE, EXTENDED RELEASE ORAL at 14:12

## 2020-01-01 RX ADMIN — MUPIROCIN 1 APPLICATION(S): 20 OINTMENT TOPICAL at 05:42

## 2020-01-01 RX ADMIN — NYSTATIN CREAM 1 APPLICATION(S): 100000 CREAM TOPICAL at 06:02

## 2020-01-01 RX ADMIN — PANTOPRAZOLE SODIUM 40 MILLIGRAM(S): 20 TABLET, DELAYED RELEASE ORAL at 11:26

## 2020-01-01 RX ADMIN — MIDODRINE HYDROCHLORIDE 10 MILLIGRAM(S): 2.5 TABLET ORAL at 13:11

## 2020-01-01 RX ADMIN — ENOXAPARIN SODIUM 50 MILLIGRAM(S): 100 INJECTION SUBCUTANEOUS at 11:43

## 2020-01-01 RX ADMIN — PHENYLEPHRINE HYDROCHLORIDE 2.28 MICROGRAM(S)/KG/MIN: 10 INJECTION INTRAVENOUS at 02:47

## 2020-01-01 RX ADMIN — FLUCONAZOLE 100 MILLIGRAM(S): 150 TABLET ORAL at 16:33

## 2020-01-01 RX ADMIN — Medication 650 MILLIGRAM(S): at 01:24

## 2020-01-01 RX ADMIN — LINEZOLID 600 MILLIGRAM(S): 600 INJECTION, SOLUTION INTRAVENOUS at 05:53

## 2020-01-01 RX ADMIN — HEPARIN SODIUM 5000 UNIT(S): 5000 INJECTION INTRAVENOUS; SUBCUTANEOUS at 06:01

## 2020-01-01 RX ADMIN — LACTULOSE 30 GRAM(S): 10 SOLUTION ORAL at 23:03

## 2020-01-01 RX ADMIN — MUPIROCIN 1 APPLICATION(S): 20 OINTMENT TOPICAL at 17:25

## 2020-01-01 RX ADMIN — PANTOPRAZOLE SODIUM 40 MILLIGRAM(S): 20 TABLET, DELAYED RELEASE ORAL at 12:22

## 2020-01-01 RX ADMIN — PANTOPRAZOLE SODIUM 40 MILLIGRAM(S): 20 TABLET, DELAYED RELEASE ORAL at 17:37

## 2020-01-01 RX ADMIN — CHLORHEXIDINE GLUCONATE 1 APPLICATION(S): 213 SOLUTION TOPICAL at 13:09

## 2020-01-01 RX ADMIN — PANTOPRAZOLE SODIUM 40 MILLIGRAM(S): 20 TABLET, DELAYED RELEASE ORAL at 06:01

## 2020-01-01 RX ADMIN — Medication 1 PACKET(S): at 09:01

## 2020-01-01 RX ADMIN — ENOXAPARIN SODIUM 50 MILLIGRAM(S): 100 INJECTION SUBCUTANEOUS at 12:39

## 2020-01-01 RX ADMIN — Medication 1 APPLICATION(S): at 12:32

## 2020-01-01 RX ADMIN — Medication 1 PACKET(S): at 17:35

## 2020-01-01 RX ADMIN — DAPTOMYCIN 114 MILLIGRAM(S): 500 INJECTION, POWDER, LYOPHILIZED, FOR SOLUTION INTRAVENOUS at 16:51

## 2020-01-01 RX ADMIN — LACTULOSE 30 GRAM(S): 10 SOLUTION ORAL at 14:54

## 2020-01-01 RX ADMIN — SODIUM CHLORIDE 2000 MILLILITER(S): 9 INJECTION, SOLUTION INTRAVENOUS at 20:48

## 2020-01-01 RX ADMIN — LACTULOSE 20 GRAM(S): 10 SOLUTION ORAL at 05:31

## 2020-01-01 RX ADMIN — Medication 1 APPLICATION(S): at 11:52

## 2020-01-01 RX ADMIN — Medication 50 MILLILITER(S): at 06:42

## 2020-01-01 RX ADMIN — DEXMEDETOMIDINE HYDROCHLORIDE IN 0.9% SODIUM CHLORIDE 2.81 MICROGRAM(S)/KG/HR: 4 INJECTION INTRAVENOUS at 01:50

## 2020-01-01 RX ADMIN — LINEZOLID 600 MILLIGRAM(S): 600 INJECTION, SOLUTION INTRAVENOUS at 17:40

## 2020-01-01 RX ADMIN — Medication 100 MILLIGRAM(S): at 22:02

## 2020-01-01 RX ADMIN — PANTOPRAZOLE SODIUM 40 MILLIGRAM(S): 20 TABLET, DELAYED RELEASE ORAL at 12:14

## 2020-01-01 RX ADMIN — Medication 1 MILLIGRAM(S): at 12:08

## 2020-01-01 RX ADMIN — Medication 50 MILLILITER(S): at 01:05

## 2020-01-01 RX ADMIN — ZINC SULFATE TAB 220 MG (50 MG ZINC EQUIVALENT) 220 MILLIGRAM(S): 220 (50 ZN) TAB at 09:21

## 2020-01-01 RX ADMIN — Medication 500 MILLIGRAM(S): at 17:54

## 2020-01-01 RX ADMIN — CHLORHEXIDINE GLUCONATE 15 MILLILITER(S): 213 SOLUTION TOPICAL at 08:21

## 2020-01-01 RX ADMIN — Medication 100 MILLIEQUIVALENT(S): at 11:59

## 2020-01-01 RX ADMIN — SODIUM CHLORIDE 40 MILLILITER(S): 9 INJECTION, SOLUTION INTRAVENOUS at 08:32

## 2020-01-01 RX ADMIN — CEFEPIME 100 MILLIGRAM(S): 1 INJECTION, POWDER, FOR SOLUTION INTRAMUSCULAR; INTRAVENOUS at 13:40

## 2020-01-01 RX ADMIN — Medication 80 MILLIGRAM(S): at 06:05

## 2020-01-01 RX ADMIN — CHLORHEXIDINE GLUCONATE 1 APPLICATION(S): 213 SOLUTION TOPICAL at 11:11

## 2020-01-01 RX ADMIN — HEPARIN SODIUM 5000 UNIT(S): 5000 INJECTION INTRAVENOUS; SUBCUTANEOUS at 06:50

## 2020-01-01 RX ADMIN — PANTOPRAZOLE SODIUM 40 MILLIGRAM(S): 20 TABLET, DELAYED RELEASE ORAL at 05:20

## 2020-01-01 RX ADMIN — LINEZOLID 600 MILLIGRAM(S): 600 INJECTION, SOLUTION INTRAVENOUS at 17:41

## 2020-01-01 RX ADMIN — SODIUM CHLORIDE 1000 MILLILITER(S): 9 INJECTION INTRAMUSCULAR; INTRAVENOUS; SUBCUTANEOUS at 22:00

## 2020-01-01 RX ADMIN — Medication 1 MILLIGRAM(S): at 12:55

## 2020-01-01 RX ADMIN — Medication 50 MILLIGRAM(S): at 17:52

## 2020-01-01 RX ADMIN — CEFEPIME 100 MILLIGRAM(S): 1 INJECTION, POWDER, FOR SOLUTION INTRAMUSCULAR; INTRAVENOUS at 21:54

## 2020-01-01 RX ADMIN — MIDODRINE HYDROCHLORIDE 10 MILLIGRAM(S): 2.5 TABLET ORAL at 06:24

## 2020-01-01 RX ADMIN — Medication 100 MILLIGRAM(S): at 23:08

## 2020-01-01 RX ADMIN — NYSTATIN CREAM 1 APPLICATION(S): 100000 CREAM TOPICAL at 07:02

## 2020-01-01 RX ADMIN — LACTULOSE 30 GRAM(S): 10 SOLUTION ORAL at 17:35

## 2020-01-01 RX ADMIN — ZINC SULFATE TAB 220 MG (50 MG ZINC EQUIVALENT) 220 MILLIGRAM(S): 220 (50 ZN) TAB at 11:52

## 2020-01-01 RX ADMIN — Medication 500 MILLIGRAM(S): at 08:32

## 2020-01-01 RX ADMIN — Medication 1 TABLET(S): at 12:36

## 2020-01-01 RX ADMIN — Medication 500 MILLIGRAM(S): at 05:39

## 2020-01-01 RX ADMIN — PANTOPRAZOLE SODIUM 40 MILLIGRAM(S): 20 TABLET, DELAYED RELEASE ORAL at 06:07

## 2020-01-01 RX ADMIN — Medication 124 MILLIGRAM(S): at 12:00

## 2020-01-01 RX ADMIN — DAPTOMYCIN 114 MILLIGRAM(S): 500 INJECTION, POWDER, LYOPHILIZED, FOR SOLUTION INTRAVENOUS at 18:51

## 2020-01-01 RX ADMIN — PANTOPRAZOLE SODIUM 40 MILLIGRAM(S): 20 TABLET, DELAYED RELEASE ORAL at 11:28

## 2020-01-01 RX ADMIN — LINEZOLID 600 MILLIGRAM(S): 600 INJECTION, SOLUTION INTRAVENOUS at 17:58

## 2020-01-01 RX ADMIN — ENOXAPARIN SODIUM 50 MILLIGRAM(S): 100 INJECTION SUBCUTANEOUS at 12:53

## 2020-01-01 RX ADMIN — Medication 40 MILLIGRAM(S): at 05:47

## 2020-01-01 RX ADMIN — LACTULOSE 20 GRAM(S): 10 SOLUTION ORAL at 21:54

## 2020-01-01 RX ADMIN — Medication 102 MILLIGRAM(S): at 05:52

## 2020-01-01 RX ADMIN — MIDODRINE HYDROCHLORIDE 10 MILLIGRAM(S): 2.5 TABLET ORAL at 05:55

## 2020-01-01 RX ADMIN — Medication 100 MILLIGRAM(S): at 22:41

## 2020-01-01 RX ADMIN — ZINC SULFATE TAB 220 MG (50 MG ZINC EQUIVALENT) 220 MILLIGRAM(S): 220 (50 ZN) TAB at 11:54

## 2020-01-01 RX ADMIN — LINEZOLID 600 MILLIGRAM(S): 600 INJECTION, SOLUTION INTRAVENOUS at 07:02

## 2020-01-01 RX ADMIN — NYSTATIN CREAM 1 APPLICATION(S): 100000 CREAM TOPICAL at 05:37

## 2020-01-01 RX ADMIN — FLUCONAZOLE 100 MILLIGRAM(S): 150 TABLET ORAL at 15:41

## 2020-01-01 RX ADMIN — LACTULOSE 30 GRAM(S): 10 SOLUTION ORAL at 05:12

## 2020-01-01 RX ADMIN — PANTOPRAZOLE SODIUM 40 MILLIGRAM(S): 20 TABLET, DELAYED RELEASE ORAL at 17:29

## 2020-01-01 RX ADMIN — FLUCONAZOLE 200 MILLIGRAM(S): 150 TABLET ORAL at 08:42

## 2020-01-01 RX ADMIN — Medication 500 MILLIGRAM(S): at 18:53

## 2020-01-01 RX ADMIN — SODIUM CHLORIDE 1000 MILLILITER(S): 9 INJECTION INTRAMUSCULAR; INTRAVENOUS; SUBCUTANEOUS at 03:14

## 2020-01-01 RX ADMIN — MUPIROCIN 1 APPLICATION(S): 20 OINTMENT TOPICAL at 05:41

## 2020-01-01 RX ADMIN — LACTULOSE 30 GRAM(S): 10 SOLUTION ORAL at 23:58

## 2020-01-01 RX ADMIN — MEROPENEM 100 MILLIGRAM(S): 1 INJECTION INTRAVENOUS at 15:41

## 2020-01-01 RX ADMIN — LINEZOLID 600 MILLIGRAM(S): 600 INJECTION, SOLUTION INTRAVENOUS at 18:11

## 2020-01-24 ENCOUNTER — INPATIENT (INPATIENT)
Facility: HOSPITAL | Age: 65
LOS: 5 days | Discharge: HOME HEALTH | DRG: 388 | End: 2020-01-30
Attending: INTERNAL MEDICINE | Admitting: INTERNAL MEDICINE
Payer: MEDICAID

## 2020-01-24 VITALS
HEIGHT: 66 IN | HEART RATE: 109 BPM | TEMPERATURE: 97 F | DIASTOLIC BLOOD PRESSURE: 74 MMHG | WEIGHT: 132.06 LBS | OXYGEN SATURATION: 97 % | SYSTOLIC BLOOD PRESSURE: 99 MMHG | RESPIRATION RATE: 16 BRPM

## 2020-01-24 DIAGNOSIS — K56.609 UNSPECIFIED INTESTINAL OBSTRUCTION, UNSPECIFIED AS TO PARTIAL VERSUS COMPLETE OBSTRUCTION: ICD-10-CM

## 2020-01-24 DIAGNOSIS — Z98.890 OTHER SPECIFIED POSTPROCEDURAL STATES: Chronic | ICD-10-CM

## 2020-01-24 LAB
ALBUMIN SERPL ELPH-MCNC: 1.1 G/DL — LOW (ref 3.5–5)
ALP SERPL-CCNC: 122 U/L — HIGH (ref 40–120)
ALT FLD-CCNC: 63 U/L DA — HIGH (ref 10–60)
ANION GAP SERPL CALC-SCNC: 7 MMOL/L — SIGNIFICANT CHANGE UP (ref 5–17)
APTT BLD: 43.2 SEC — HIGH (ref 27.5–36.3)
AST SERPL-CCNC: 50 U/L — HIGH (ref 10–40)
BASOPHILS # BLD AUTO: 0 K/UL — SIGNIFICANT CHANGE UP (ref 0–0.2)
BASOPHILS NFR BLD AUTO: 0 % — SIGNIFICANT CHANGE UP (ref 0–2)
BILIRUB SERPL-MCNC: 0.4 MG/DL — SIGNIFICANT CHANGE UP (ref 0.2–1.2)
BUN SERPL-MCNC: 23 MG/DL — HIGH (ref 7–18)
CALCIUM SERPL-MCNC: 6.4 MG/DL — CRITICAL LOW (ref 8.4–10.5)
CHLORIDE SERPL-SCNC: 109 MMOL/L — HIGH (ref 96–108)
CO2 SERPL-SCNC: 23 MMOL/L — SIGNIFICANT CHANGE UP (ref 22–31)
CREAT SERPL-MCNC: 1.05 MG/DL — SIGNIFICANT CHANGE UP (ref 0.5–1.3)
EOSINOPHIL # BLD AUTO: 0 K/UL — SIGNIFICANT CHANGE UP (ref 0–0.5)
EOSINOPHIL NFR BLD AUTO: 0 % — SIGNIFICANT CHANGE UP (ref 0–6)
FLU A RESULT: SIGNIFICANT CHANGE UP
FLU A RESULT: SIGNIFICANT CHANGE UP
FLUAV AG NPH QL: SIGNIFICANT CHANGE UP
FLUBV AG NPH QL: SIGNIFICANT CHANGE UP
GLUCOSE SERPL-MCNC: 106 MG/DL — HIGH (ref 70–99)
HCT VFR BLD CALC: 30.7 % — LOW (ref 34.5–45)
HGB BLD-MCNC: 10.2 G/DL — LOW (ref 11.5–15.5)
INR BLD: 4 RATIO — HIGH (ref 0.88–1.16)
LACTATE SERPL-SCNC: 1.9 MMOL/L — SIGNIFICANT CHANGE UP (ref 0.7–2)
LIDOCAIN IGE QN: 12 U/L — LOW (ref 73–393)
LYMPHOCYTES # BLD AUTO: 1.01 K/UL — SIGNIFICANT CHANGE UP (ref 1–3.3)
LYMPHOCYTES # BLD AUTO: 13 % — SIGNIFICANT CHANGE UP (ref 13–44)
MCHC RBC-ENTMCNC: 29.3 PG — SIGNIFICANT CHANGE UP (ref 27–34)
MCHC RBC-ENTMCNC: 33.2 GM/DL — SIGNIFICANT CHANGE UP (ref 32–36)
MCV RBC AUTO: 88.2 FL — SIGNIFICANT CHANGE UP (ref 80–100)
MONOCYTES # BLD AUTO: 0.54 K/UL — SIGNIFICANT CHANGE UP (ref 0–0.9)
MONOCYTES NFR BLD AUTO: 7 % — SIGNIFICANT CHANGE UP (ref 2–14)
NEUTROPHILS # BLD AUTO: 6.21 K/UL — SIGNIFICANT CHANGE UP (ref 1.8–7.4)
NEUTROPHILS NFR BLD AUTO: 80 % — HIGH (ref 43–77)
PLATELET # BLD AUTO: 343 K/UL — SIGNIFICANT CHANGE UP (ref 150–400)
POTASSIUM SERPL-MCNC: 4.3 MMOL/L — SIGNIFICANT CHANGE UP (ref 3.5–5.3)
POTASSIUM SERPL-SCNC: 4.3 MMOL/L — SIGNIFICANT CHANGE UP (ref 3.5–5.3)
PROT SERPL-MCNC: 4 G/DL — LOW (ref 6–8.3)
PROTHROM AB SERPL-ACNC: 46.4 SEC — HIGH (ref 10–12.9)
RBC # BLD: 3.48 M/UL — LOW (ref 3.8–5.2)
RBC # FLD: 22.9 % — HIGH (ref 10.3–14.5)
RSV RESULT: SIGNIFICANT CHANGE UP
RSV RNA RESP QL NAA+PROBE: SIGNIFICANT CHANGE UP
SODIUM SERPL-SCNC: 139 MMOL/L — SIGNIFICANT CHANGE UP (ref 135–145)
TROPONIN I SERPL-MCNC: <0.015 NG/ML — SIGNIFICANT CHANGE UP (ref 0–0.04)
WBC # BLD: 7.76 K/UL — SIGNIFICANT CHANGE UP (ref 3.8–10.5)
WBC # FLD AUTO: 7.76 K/UL — SIGNIFICANT CHANGE UP (ref 3.8–10.5)

## 2020-01-24 PROCEDURE — 99222 1ST HOSP IP/OBS MODERATE 55: CPT

## 2020-01-24 PROCEDURE — 93010 ELECTROCARDIOGRAM REPORT: CPT

## 2020-01-24 PROCEDURE — 99232 SBSQ HOSP IP/OBS MODERATE 35: CPT

## 2020-01-24 PROCEDURE — 74177 CT ABD & PELVIS W/CONTRAST: CPT | Mod: 26

## 2020-01-24 PROCEDURE — 71045 X-RAY EXAM CHEST 1 VIEW: CPT | Mod: 26

## 2020-01-24 PROCEDURE — 74019 RADEX ABDOMEN 2 VIEWS: CPT | Mod: 26

## 2020-01-24 PROCEDURE — 99285 EMERGENCY DEPT VISIT HI MDM: CPT

## 2020-01-24 RX ORDER — SODIUM CHLORIDE 9 MG/ML
500 INJECTION INTRAMUSCULAR; INTRAVENOUS; SUBCUTANEOUS ONCE
Refills: 0 | Status: COMPLETED | OUTPATIENT
Start: 2020-01-24 | End: 2020-01-24

## 2020-01-24 RX ORDER — ONDANSETRON 8 MG/1
4 TABLET, FILM COATED ORAL EVERY 6 HOURS
Refills: 0 | Status: DISCONTINUED | OUTPATIENT
Start: 2020-01-24 | End: 2020-01-30

## 2020-01-24 RX ORDER — SODIUM CHLORIDE 9 MG/ML
1000 INJECTION INTRAMUSCULAR; INTRAVENOUS; SUBCUTANEOUS
Refills: 0 | Status: DISCONTINUED | OUTPATIENT
Start: 2020-01-24 | End: 2020-01-25

## 2020-01-24 RX ORDER — ONDANSETRON 8 MG/1
4 TABLET, FILM COATED ORAL ONCE
Refills: 0 | Status: COMPLETED | OUTPATIENT
Start: 2020-01-24 | End: 2020-01-24

## 2020-01-24 RX ORDER — SODIUM CHLORIDE 9 MG/ML
1000 INJECTION INTRAMUSCULAR; INTRAVENOUS; SUBCUTANEOUS ONCE
Refills: 0 | Status: COMPLETED | OUTPATIENT
Start: 2020-01-24 | End: 2020-01-24

## 2020-01-24 RX ORDER — ACETAMINOPHEN 500 MG
1000 TABLET ORAL ONCE
Refills: 0 | Status: COMPLETED | OUTPATIENT
Start: 2020-01-24 | End: 2020-01-24

## 2020-01-24 RX ADMIN — SODIUM CHLORIDE 500 MILLILITER(S): 9 INJECTION INTRAMUSCULAR; INTRAVENOUS; SUBCUTANEOUS at 17:52

## 2020-01-24 RX ADMIN — SODIUM CHLORIDE 1000 MILLILITER(S): 9 INJECTION INTRAMUSCULAR; INTRAVENOUS; SUBCUTANEOUS at 17:53

## 2020-01-24 RX ADMIN — SODIUM CHLORIDE 125 MILLILITER(S): 9 INJECTION INTRAMUSCULAR; INTRAVENOUS; SUBCUTANEOUS at 21:43

## 2020-01-24 RX ADMIN — Medication 1000 MILLIGRAM(S): at 19:00

## 2020-01-24 RX ADMIN — Medication 400 MILLIGRAM(S): at 18:58

## 2020-01-24 RX ADMIN — SODIUM CHLORIDE 500 MILLILITER(S): 9 INJECTION INTRAMUSCULAR; INTRAVENOUS; SUBCUTANEOUS at 16:52

## 2020-01-24 RX ADMIN — SODIUM CHLORIDE 1000 MILLILITER(S): 9 INJECTION INTRAMUSCULAR; INTRAVENOUS; SUBCUTANEOUS at 18:53

## 2020-01-24 RX ADMIN — ONDANSETRON 4 MILLIGRAM(S): 8 TABLET, FILM COATED ORAL at 15:41

## 2020-01-24 RX ADMIN — ONDANSETRON 4 MILLIGRAM(S): 8 TABLET, FILM COATED ORAL at 21:43

## 2020-01-24 NOTE — ED PROVIDER NOTE - OBJECTIVE STATEMENT
h/o recurrent SBO's, s/p exp lap, SB resection,  DVT, PE, chronic leg swelling, and anasarca p/w generalized weakness, nausea, vomiting x 1 day. Vomiting brown x 2. Reports last BM last night. Able to pass gas last this morning. Denies any abd pain, fever, cp, sob, diarrhea or sick contacts.

## 2020-01-24 NOTE — H&P ADULT - NSHPLABSRESULTS_GEN_ALL_CORE
10.2   7.76  )-----------( 343      ( 24 Jan 2020 14:20 )             30.7   01-24    139  |  109<H>  |  23<H>  ----------------------------<  106<H>  4.3   |  23  |  1.05    Ca    6.4<LL>      24 Jan 2020 14:20    TPro  4.0<L>  /  Alb  1.1<L>  /  TBili  0.4  /  DBili  x   /  AST  50<H>  /  ALT  63<H>  /  AlkPhos  122<H>  01-24  < from: CT Abdomen and Pelvis w/ IV Cont (01.24.20 @ 19:30) >    IMPRESSION:     Interval development of marked small bowel obstruction with 2 points of transition concerning for closed loop obstruction. Swirling of the mesenteric vessels and interval displacement of the collapsed cecum into the left side of the abdomen. This critical value was discussed with Dr. Horta in the emergency room at 8:00 PM on 1/24/2020.    NG tube. Small bilateral pleural effusions are stable. Diffuse subcutaneous emphysema.    < end of copied text >

## 2020-01-24 NOTE — H&P ADULT - HISTORY OF PRESENT ILLNESS
63yo female with hx of recurrent SBO's, s/p exp lap, SB resection in 2015, ex lap, ALBINA in 2018, DVT, PE, on xarelto, IVC filter, chronic leg swelling, and anasarca p/w generalized weakness, nausea, vomiting x 1 day. Reports last BM last night.  Denies any abd pain, fever, cp, sob, diarrhea or sick contacts.

## 2020-01-24 NOTE — ED ADULT NURSE NOTE - CHIEF COMPLAINT QUOTE
c/o generalized fatigue, not eating and nausea x 4 days. vomited x 1 this morning. Last BM Y/D morning. HX SBO

## 2020-01-24 NOTE — ED PROVIDER NOTE - CLINICAL SUMMARY MEDICAL DECISION MAKING FREE TEXT BOX
63 yo F h/o Recurrent SBO's, s/p exp lap, SB resection,  DVT, PE, chronic leg swelling, and anasarca here for vomiting-concern for SBO, UTI among other causes-will do labs, xray abd, CXR, UA and reassess

## 2020-01-24 NOTE — ED ADULT TRIAGE NOTE - CHIEF COMPLAINT QUOTE
c/o generalized fatigue, not eating and nausea x 4 days. c/o generalized fatigue, not eating and nausea x 4 days. vomited x 1 this morning. Last BM Y/D morning. HX SBO

## 2020-01-25 DIAGNOSIS — Z71.89 OTHER SPECIFIED COUNSELING: ICD-10-CM

## 2020-01-25 DIAGNOSIS — I26.99 OTHER PULMONARY EMBOLISM WITHOUT ACUTE COR PULMONALE: ICD-10-CM

## 2020-01-25 DIAGNOSIS — R18.8 OTHER ASCITES: ICD-10-CM

## 2020-01-25 DIAGNOSIS — E87.8 OTHER DISORDERS OF ELECTROLYTE AND FLUID BALANCE, NOT ELSEWHERE CLASSIFIED: ICD-10-CM

## 2020-01-25 DIAGNOSIS — Z29.9 ENCOUNTER FOR PROPHYLACTIC MEASURES, UNSPECIFIED: ICD-10-CM

## 2020-01-25 LAB
ANION GAP SERPL CALC-SCNC: 10 MMOL/L — SIGNIFICANT CHANGE UP (ref 5–17)
BUN SERPL-MCNC: 21 MG/DL — HIGH (ref 7–18)
CALCIUM SERPL-MCNC: 5.9 MG/DL — CRITICAL LOW (ref 8.4–10.5)
CHLORIDE SERPL-SCNC: 110 MMOL/L — HIGH (ref 96–108)
CO2 SERPL-SCNC: 21 MMOL/L — LOW (ref 22–31)
CREAT SERPL-MCNC: 0.87 MG/DL — SIGNIFICANT CHANGE UP (ref 0.5–1.3)
GLUCOSE SERPL-MCNC: 82 MG/DL — SIGNIFICANT CHANGE UP (ref 70–99)
HCT VFR BLD CALC: 31.9 % — LOW (ref 34.5–45)
HGB BLD-MCNC: 10.6 G/DL — LOW (ref 11.5–15.5)
MAGNESIUM SERPL-MCNC: 1.3 MG/DL — LOW (ref 1.6–2.6)
MCHC RBC-ENTMCNC: 29 PG — SIGNIFICANT CHANGE UP (ref 27–34)
MCHC RBC-ENTMCNC: 33.2 GM/DL — SIGNIFICANT CHANGE UP (ref 32–36)
MCV RBC AUTO: 87.4 FL — SIGNIFICANT CHANGE UP (ref 80–100)
NRBC # BLD: 0 /100 WBCS — SIGNIFICANT CHANGE UP (ref 0–0)
PHOSPHATE SERPL-MCNC: 2.5 MG/DL — SIGNIFICANT CHANGE UP (ref 2.5–4.5)
PLATELET # BLD AUTO: 349 K/UL — SIGNIFICANT CHANGE UP (ref 150–400)
POTASSIUM SERPL-MCNC: 3.9 MMOL/L — SIGNIFICANT CHANGE UP (ref 3.5–5.3)
POTASSIUM SERPL-SCNC: 3.9 MMOL/L — SIGNIFICANT CHANGE UP (ref 3.5–5.3)
RBC # BLD: 3.65 M/UL — LOW (ref 3.8–5.2)
RBC # FLD: 22.8 % — HIGH (ref 10.3–14.5)
SODIUM SERPL-SCNC: 141 MMOL/L — SIGNIFICANT CHANGE UP (ref 135–145)
WBC # BLD: 5.99 K/UL — SIGNIFICANT CHANGE UP (ref 3.8–10.5)
WBC # FLD AUTO: 5.99 K/UL — SIGNIFICANT CHANGE UP (ref 3.8–10.5)

## 2020-01-25 PROCEDURE — 99231 SBSQ HOSP IP/OBS SF/LOW 25: CPT

## 2020-01-25 RX ORDER — FUROSEMIDE 40 MG
40 TABLET ORAL DAILY
Refills: 0 | Status: DISCONTINUED | OUTPATIENT
Start: 2020-01-25 | End: 2020-01-30

## 2020-01-25 RX ORDER — LANOLIN ALCOHOL/MO/W.PET/CERES
3 CREAM (GRAM) TOPICAL AT BEDTIME
Refills: 0 | Status: DISCONTINUED | OUTPATIENT
Start: 2020-01-25 | End: 2020-01-26

## 2020-01-25 RX ORDER — PIPERACILLIN AND TAZOBACTAM 4; .5 G/20ML; G/20ML
3.38 INJECTION, POWDER, LYOPHILIZED, FOR SOLUTION INTRAVENOUS ONCE
Refills: 0 | Status: DISCONTINUED | OUTPATIENT
Start: 2020-01-25 | End: 2020-01-25

## 2020-01-25 RX ORDER — MAGNESIUM SULFATE 500 MG/ML
1 VIAL (ML) INJECTION ONCE
Refills: 0 | Status: COMPLETED | OUTPATIENT
Start: 2020-01-25 | End: 2020-01-25

## 2020-01-25 RX ORDER — SODIUM CHLORIDE 9 MG/ML
1000 INJECTION, SOLUTION INTRAVENOUS
Refills: 0 | Status: DISCONTINUED | OUTPATIENT
Start: 2020-01-25 | End: 2020-01-26

## 2020-01-25 RX ORDER — FUROSEMIDE 40 MG
40 TABLET ORAL ONCE
Refills: 0 | Status: DISCONTINUED | OUTPATIENT
Start: 2020-01-25 | End: 2020-01-25

## 2020-01-25 RX ORDER — ALBUMIN HUMAN 25 %
50 VIAL (ML) INTRAVENOUS EVERY 6 HOURS
Refills: 0 | Status: COMPLETED | OUTPATIENT
Start: 2020-01-25 | End: 2020-01-26

## 2020-01-25 RX ORDER — PIPERACILLIN AND TAZOBACTAM 4; .5 G/20ML; G/20ML
3.38 INJECTION, POWDER, LYOPHILIZED, FOR SOLUTION INTRAVENOUS EVERY 8 HOURS
Refills: 0 | Status: DISCONTINUED | OUTPATIENT
Start: 2020-01-25 | End: 2020-01-25

## 2020-01-25 RX ORDER — ACETAMINOPHEN 500 MG
1000 TABLET ORAL ONCE
Refills: 0 | Status: COMPLETED | OUTPATIENT
Start: 2020-01-25 | End: 2020-01-25

## 2020-01-25 RX ADMIN — Medication 50 MILLILITER(S): at 23:53

## 2020-01-25 RX ADMIN — Medication 50 MILLILITER(S): at 18:02

## 2020-01-25 RX ADMIN — Medication 100 GRAM(S): at 19:06

## 2020-01-25 RX ADMIN — SODIUM CHLORIDE 120 MILLILITER(S): 9 INJECTION, SOLUTION INTRAVENOUS at 22:27

## 2020-01-25 RX ADMIN — Medication 3 MILLIGRAM(S): at 22:27

## 2020-01-25 RX ADMIN — Medication 1000 MILLIGRAM(S): at 06:15

## 2020-01-25 RX ADMIN — Medication 400 MILLIGRAM(S): at 05:45

## 2020-01-25 NOTE — CONSULT NOTE ADULT - PROBLEM SELECTOR RECOMMENDATION 5
patient wishes to be full code at this time  all aggressive measures to be taken in an emergent scenario

## 2020-01-25 NOTE — CONSULT NOTE ADULT - PROBLEM SELECTOR RECOMMENDATION 9
CT abdomen findings noted  patient and family open to surgical intervention if medical management fails  c/w NGT, NPO  management per surgery

## 2020-01-25 NOTE — CONSULT NOTE ADULT - SUBJECTIVE AND OBJECTIVE BOX
=================Interval HPI===================  - HPI:    63yo female with hx of recurrent SBO's, s/p exp lap, SB resection in 2015, ex lap, ALBINA in 2018, DVT, PE, on xarelto, IVC filter, chronic leg swelling, and anasarca p/w generalized weakness, nausea, vomiting x 1 day. Reports last BM last night.  Denies any abd pain, fever, cp, sob, diarrhea or sick contacts.    - Social History  Smoking: denies  Alcohol: denies  Drugs: denies    ================CHIEF COMPLAINT===============  Patient is a 64y old  Female who presents with a chief complaint of sbo (25 Jan 2020 16:38)        =========INTERVAL HPI/OVERNIGHT EVENTS=========  Offers no new complaints. Denies any pain at this time. Does endorse difficulty sleeping at night due to abdominal discomfort. Reports that she would prefer nonsurgical management of her SBO, but is open to surgical intervention if medically necessary. Las BM was 1/23 as per patient.      ============CURRENT MEDICATIONS===============    MEDICATIONS  (STANDING):  albumin human 25% IVPB 50 milliLiter(s) IV Intermittent every 6 hours  furosemide   Injectable 40 milliGRAM(s) IV Push daily  sodium chloride 0.9%. 1000 milliLiter(s) (125 mL/Hr) IV Continuous <Continuous>    MEDICATIONS  (PRN):  ondansetron Injectable 4 milliGRAM(s) IV Push every 6 hours PRN Nausea        ============REVIEW OF SYSTEMS==================    CONSTITUTIONAL: No fever  EYES: no acute visual disturbances  NECK: No pain or stiffness  RESPIRATORY: No cough; No shortness of breath  CARDIOVASCULAR: No chest pain, no palpitations  GASTROINTESTINAL: No pain. No nausea or vomiting; No diarrhea   NEUROLOGICAL: No headache or numbness, no tremors  MUSCULOSKELETAL: No joint pain, no muscle pain  GENITOURINARY: no dysuria, no frequency, no hesitancy  PSYCHIATRY: no depression , no anxiety  ALL OTHER  ROS negative      ================VITALS SIGNS=====================  Vital Signs Last 24 Hrs  T(C): 36.9 (25 Jan 2020 17:43), Max: 37.1 (24 Jan 2020 22:52)  T(F): 98.5 (25 Jan 2020 17:43), Max: 98.7 (24 Jan 2020 22:52)  HR: 88 (25 Jan 2020 17:43) (80 - 107)  BP: 96/61 (25 Jan 2020 17:43) (96/61 - 105/72)  BP(mean): --  RR: 18 (25 Jan 2020 17:43) (16 - 18)  SpO2: 97% (25 Jan 2020 17:43) (96% - 97%)    ===============PHYSICAL EXAM====================    GENERAL: NAD  HEENT: Normocephalic;  conjunctivae and sclerae clear; moist mucous membranes; NGT in place  NECK : supple  CHEST/LUNG: Clear to auscultation bilaterally with good air entry   HEART: S1 S2  regular; no murmurs, gallops or rubs  ABDOMEN: +abdominal distension; nontender; bowel sounds hypoactive  EXTREMITIES: 2+ pitting edema of b/l LE and 2+ pitting edema of LUE  SKIN: warm and dry; no rash  NERVOUS SYSTEM:  Awake and alert; Oriented  to place, person and time    ==============LABORATORIES======================  LABS:                        10.6   5.99  )-----------( 349      ( 25 Jan 2020 07:04 )             31.9     01-25    141  |  110<H>  |  21<H>  ----------------------------<  82  3.9   |  21<L>  |  0.87    Ca    5.9<LL>      25 Jan 2020 07:04  Phos  2.5     01-25  Mg     1.3     01-25    TPro  4.0<L>  /  Alb  1.1<L>  /  TBili  0.4  /  DBili  x   /  AST  50<H>  /  ALT  63<H>  /  AlkPhos  122<H>  01-24    PT/INR - ( 24 Jan 2020 22:24 )   PT: 46.4 sec;   INR: 4.00 ratio         PTT - ( 24 Jan 2020 22:24 )  PTT:43.2 sec      =============INPUTS/OUPUTS=====================    01-24-20 @ 07:01 - 01-25-20 @ 07:00  --------------------------------------------------------  IN: 0 mL / OUT: 800 mL / NET: -800 mL    01-25-20 @ 07:01 - 01-25-20 @ 18:16  --------------------------------------------------------  IN: 1500 mL / OUT: 500 mL / NET: 1000 mL          RADIOLOGY & ADDITIONAL TESTS:    Imaging Personally Reviewed:  YES    Consultant(s) Notes Reviewed:   YES    Care Discussed with Consultants : YES    Plan of care was discussed with patient  and /or primary care giver; all questions and concerns were addressed and care was aligned with patient's wishes. Time was allowed for questions that were answered to the best of my abilities

## 2020-01-25 NOTE — CONSULT NOTE ADULT - PROBLEM SELECTOR RECOMMENDATION 6
IMPROVE VTE Individual Risk Assessment          RISK                                                          Points  [ X ] Previous VTE                                                3  [  ] Thrombophilia                                             2  [  ] Lower limb paralysis                                   2        (unable to hold up >15 seconds)    [  ] Current Cancer                                             2         (within 6 months)  [ X ] Immobilization > 24 hrs                              1  [  ] ICU/CCU stay > 24 hours                             1  [ X ] Age > 60                                                         1    IMPROVE VTE Score: 5    ac on hold for supratherapeutic INR; resume full dose ac when INR allows

## 2020-01-25 NOTE — PROGRESS NOTE ADULT - SUBJECTIVE AND OBJECTIVE BOX
Patient was seen and examined  Patient is a 64y old  Female who presents with a chief complaint of sbo (25 Jan 2020 12:15)  ABD DISTENSION       INTERVAL HPI/OVERNIGHT EVENTS:  T(C): 36.9 (01-25-20 @ 14:37), Max: 37.1 (01-24-20 @ 22:52)  HR: 80 (01-25-20 @ 14:37) (80 - 107)  BP: 96/69 (01-25-20 @ 14:37) (96/69 - 105/72)  RR: 18 (01-25-20 @ 14:37) (16 - 18)  SpO2: 97% (01-25-20 @ 14:37) (96% - 97%)  Wt(kg): --  I&O's Summary    24 Jan 2020 07:01  -  25 Jan 2020 07:00  --------------------------------------------------------  IN: 0 mL / OUT: 800 mL / NET: -800 mL    25 Jan 2020 07:01  -  25 Jan 2020 16:40  --------------------------------------------------------  IN: 0 mL / OUT: 200 mL / NET: -200 mL        LABS:                        10.6   5.99  )-----------( 349      ( 25 Jan 2020 07:04 )             31.9     01-25    141  |  110<H>  |  21<H>  ----------------------------<  82  3.9   |  21<L>  |  0.87    Ca    5.9<LL>      25 Jan 2020 07:04  Phos  2.5     01-25  Mg     1.3     01-25    TPro  4.0<L>  /  Alb  1.1<L>  /  TBili  0.4  /  DBili  x   /  AST  50<H>  /  ALT  63<H>  /  AlkPhos  122<H>  01-24    PT/INR - ( 24 Jan 2020 22:24 )   PT: 46.4 sec;   INR: 4.00 ratio         PTT - ( 24 Jan 2020 22:24 )  PTT:43.2 sec    MEDICATIONS  (STANDING):  sodium chloride 0.9%. 1000 milliLiter(s) (125 mL/Hr) IV Continuous <Continuous>    MEDICATIONS  (PRN):  ondansetron Injectable 4 milliGRAM(s) IV Push every 6 hours PRN Nausea      RADIOLOGY & ADDITIONAL TESTS:    Imaging Personally Reviewed:  [ ] YES  [ ] NO    REVIEW OF SYSTEMS:  CONSTITUTIONAL: No fever, weight loss, or fatigue  EYES: No eye pain, visual disturbances, or discharge  ENMT:  No difficulty hearing, tinnitus, vertigo; No sinus or throat pain  NECK: No pain or stiffness  BREASTS: No pain, masses, or nipple discharge  RESPIRATORY: No cough, wheezing, chills or hemoptysis; No shortness of breath  CARDIOVASCULAR: No chest pain, palpitations, dizziness, or leg swelling  GASTROINTESTINAL: No abdominal or epigastric pain. No nausea, vomiting, or hematemesis; No diarrhea or constipation. No melena or hematochezia.  GENITOURINARY: No dysuria, frequency, hematuria, or incontinence  NEUROLOGICAL: No headaches, memory loss, loss of strength, numbness, or tremors  SKIN: No itching, burning, rashes, or lesions   LYMPH NODES: No enlarged glands  ENDOCRINE: No heat or cold intolerance; No hair loss  MUSCULOSKELETAL: No joint pain or swelling; No muscle, back, or extremity pain  PSYCHIATRIC: No depression, anxiety, mood swings, or difficulty sleeping  HEME/LYMPH: No easy bruising, or bleeding gums  ALLERY AND IMMUNOLOGIC: No hives or eczema      Consultant(s) Notes Reviewed:  [ x ] YES  [ ] NO    PHYSICAL EXAM:  GENERAL: NAD, well-groomed, well-developed  HEAD:  Atraumatic, Normocephalic  EYES: EOMI, PERRLA, conjunctiva and sclera clear  ENMT: No tonsillar erythema, exudates, or enlargement; Moist mucous membranes, Good dentition, No lesions  NECK: Supple, No JVD, Normal thyroid  NERVOUS SYSTEM:  Alert & Oriented X3, Good concentration; Motor Strength 5/5 B/L upper and lower extremities; DTRs 2+ intact and symmetric  CHEST/LUNG: Clear to percussion bilaterally; No rales, rhonchi, wheezing, or rubs  HEART: Regular rate and rhythm; No murmurs, rubs, or gallops  ABDOMEN: distension   EXTREMITIES:  2+ Peripheral Pulses, No clubbing, cyanosis, or edema  LYMPH: No lymphadenopathy noted  SKIN: No rashes or lesions    Care Discussed with Consultants/Other Providers [ x] YES  [ ] NO

## 2020-01-25 NOTE — PROGRESS NOTE ADULT - SUBJECTIVE AND OBJECTIVE BOX
65 yo woman with recurrent adhesive SBO. She has 1 day of complaints.   She is on Xarelto for DVT/PE, last dose yesterday morning. INR 4, does not reflect her anticoagulation level.   On exam - afebrile, tachycardia to ~105, stable BP. AAOx3, the abdomen is soft, distended, non-tender, no peritonitis, no hernia.   CT scan -  reported as closed loop obstruction. There is severe small bowel dilation and the cecum is in an abnormal position suggesting internal hernia. There is some mesenteric swirling. The bowel wall enhances and there is no changes to suggest ischemia. There is ascites.   Since admission and NG tube placement, she feels subjectively better, although the NG tube has no output and there is still no bowel function. She is complaining of no subjective pain.   No lactic acidosis.     I recommended emergent exploratory laparotomy because of the CT scan findings that are concerning for internal hernia / closed loop SBO. I explained the risk of progression to strangulation with necrosis of the bowel and death. She understands my recommendation and the risk involved with delaying surgery, but she is currently unwilling to undergo surgery. I tried contacting her daughter in order to include her in the discussion, but the phone goes directly to voicemail.     Plan - continue NG tube suction, IVF, Cain for urine output monitoring  Will continue to offer surgery if she is not improving.

## 2020-01-25 NOTE — PROGRESS NOTE ADULT - PROBLEM SELECTOR PLAN 1
1) admit to surgery Dr. Alvarez  2) npo, IVF  3) NGT to lws  4) serial abdominal exams  5) hold eliquis  6) possible OR tonight  7) pre-op labs  8) case and plan discussed with Dr. Alvarez and agrees

## 2020-01-25 NOTE — PROGRESS NOTE ADULT - SUBJECTIVE AND OBJECTIVE BOX
Pt s- new complaints. Denies pain. She persists in stating that she will not consider surgical intervention  Pt admits flatus, no bm.   Alert nad  Abd: soft distended nt  ngt:500cc clear fluid                          10.6   5.99  )-----------( 349      ( 25 Jan 2020 07:04 )             31.9     01-25    141  |  110<H>  |  21<H>  ----------------------------<  82  3.9   |  21<L>  |  0.87    Ca    5.9<LL>      25 Jan 2020 07:04  Phos  2.5     01-25  Mg     1.3     01-25    TPro  4.0<L>  /  Alb  1.1<L>  /  TBili  0.4  /  DBili  x   /  AST  50<H>  /  ALT  63<H>  /  AlkPhos  122<H>  01-24

## 2020-01-25 NOTE — CONSULT NOTE ADULT - PROBLEM SELECTOR RECOMMENDATION 4
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

## 2020-01-25 NOTE — PROGRESS NOTE ADULT - ASSESSMENT
65yo female with SBO, concerns for closed loop on CT, hx PE/dvt on xarelto    IV ALBUMIN AND IV LASIX   GI EVAL DR OBRIEN   [facility] SURGICAL ATTENDING IN LENGTH   consider anti coagulation once ok by gi  CONSIDER ID EVAL 65yo female with SBO, concerns for closed loop on CT, hx PE/dvt on xarelto    IV ALBUMIN AND IV LASIX   GI EVAL DR CAMILLE LING SURGICAL ATTENDING IN LENGTH   CONSIDER ID EVAL   IV ABX

## 2020-01-25 NOTE — CONSULT NOTE ADULT - PROBLEM SELECTOR RECOMMENDATION 2
abdominal distention noted with CT shows moderate ascites  mild transaminitis noted on admission; CT does not show cirrhosis of liver  d/c IV fluids  check hepatitis panel  check acetaminophen level  avoid hepatotoxins  INR daily  CMP daily  starting albumin with IV lasix with bp parameters to promote diuresis  cannot r/o Budd Chiari syndrome - would start anticoagulation when INR allows  will start zosyn empirically to cover possible underlying intraabdominal infection  f/u CTA abdomen/pelvis to r/o portal vein thrombosis  f/u TTE  GI consulted - Dr. Chatterjee  ID consulted - Dr. Pham

## 2020-01-25 NOTE — PROGRESS NOTE ADULT - ASSESSMENT
psbo  abdominal ascites  apparent occluded ivcf  clinically stable  hypoalbumenemia  hypocalcemia, hypomagnesemia    will consult medical service, possibley transfer to medical service for f/u ascites, electrolytes mgt, possible albumen infusion  ngt  i&o;s  observation  GI, Vascular surgery consults  will follow

## 2020-01-25 NOTE — PROGRESS NOTE ADULT - ATTENDING COMMENTS
Stable exam, no bowel function, abdomen soft, distended, non-tender.   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 This means, that the ascites and bowel distention can be secondary to venous congestion and no bowel strangulation.   She is still refusing any surgical intervention.    Plan:   Consult PMD Dr Ross and GI Dr Chatterjee to manage the ascites  Keep NG tube to suction  Resume anticoagulation tomorrow if still no plan for surgery (Lovenox 60mg q12hr)

## 2020-01-26 LAB
ALBUMIN SERPL ELPH-MCNC: 1.7 G/DL — LOW (ref 3.5–5)
ALBUMIN SERPL ELPH-MCNC: 1.7 G/DL — LOW (ref 3.5–5)
ALP SERPL-CCNC: 105 U/L — SIGNIFICANT CHANGE UP (ref 40–120)
ALP SERPL-CCNC: 107 U/L — SIGNIFICANT CHANGE UP (ref 40–120)
ALT FLD-CCNC: 39 U/L DA — SIGNIFICANT CHANGE UP (ref 10–60)
ALT FLD-CCNC: 41 U/L DA — SIGNIFICANT CHANGE UP (ref 10–60)
ANION GAP SERPL CALC-SCNC: 10 MMOL/L — SIGNIFICANT CHANGE UP (ref 5–17)
ANION GAP SERPL CALC-SCNC: 11 MMOL/L — SIGNIFICANT CHANGE UP (ref 5–17)
APAP SERPL-MCNC: 11 UG/ML — SIGNIFICANT CHANGE UP (ref 10–30)
APTT BLD: 51.5 SEC — HIGH (ref 27.5–36.3)
AST SERPL-CCNC: 28 U/L — SIGNIFICANT CHANGE UP (ref 10–40)
AST SERPL-CCNC: 34 U/L — SIGNIFICANT CHANGE UP (ref 10–40)
BILIRUB SERPL-MCNC: 0.5 MG/DL — SIGNIFICANT CHANGE UP (ref 0.2–1.2)
BILIRUB SERPL-MCNC: 0.5 MG/DL — SIGNIFICANT CHANGE UP (ref 0.2–1.2)
BUN SERPL-MCNC: 15 MG/DL — SIGNIFICANT CHANGE UP (ref 7–18)
BUN SERPL-MCNC: 16 MG/DL — SIGNIFICANT CHANGE UP (ref 7–18)
CALCIUM SERPL-MCNC: 6 MG/DL — CRITICAL LOW (ref 8.4–10.5)
CALCIUM SERPL-MCNC: 6.3 MG/DL — CRITICAL LOW (ref 8.4–10.5)
CHLORIDE SERPL-SCNC: 112 MMOL/L — HIGH (ref 96–108)
CHLORIDE SERPL-SCNC: 112 MMOL/L — HIGH (ref 96–108)
CO2 SERPL-SCNC: 20 MMOL/L — LOW (ref 22–31)
CO2 SERPL-SCNC: 20 MMOL/L — LOW (ref 22–31)
CREAT SERPL-MCNC: 0.64 MG/DL — SIGNIFICANT CHANGE UP (ref 0.5–1.3)
CREAT SERPL-MCNC: 0.72 MG/DL — SIGNIFICANT CHANGE UP (ref 0.5–1.3)
GLUCOSE SERPL-MCNC: 59 MG/DL — LOW (ref 70–99)
GLUCOSE SERPL-MCNC: 59 MG/DL — LOW (ref 70–99)
HAV IGM SER-ACNC: SIGNIFICANT CHANGE UP
HBV CORE IGM SER-ACNC: SIGNIFICANT CHANGE UP
HBV SURFACE AG SER-ACNC: SIGNIFICANT CHANGE UP
HCT VFR BLD CALC: 23.5 % — LOW (ref 34.5–45)
HCT VFR BLD CALC: 23.9 % — LOW (ref 34.5–45)
HCV AB S/CO SERPL IA: 0.13 S/CO — SIGNIFICANT CHANGE UP (ref 0–0.99)
HCV AB SERPL-IMP: SIGNIFICANT CHANGE UP
HGB BLD-MCNC: 7.6 G/DL — LOW (ref 11.5–15.5)
HGB BLD-MCNC: 7.7 G/DL — LOW (ref 11.5–15.5)
INR BLD: 5.99 RATIO — CRITICAL HIGH (ref 0.88–1.16)
MAGNESIUM SERPL-MCNC: 1.5 MG/DL — LOW (ref 1.6–2.6)
MAGNESIUM SERPL-MCNC: 1.8 MG/DL — SIGNIFICANT CHANGE UP (ref 1.6–2.6)
MCHC RBC-ENTMCNC: 28.6 PG — SIGNIFICANT CHANGE UP (ref 27–34)
MCHC RBC-ENTMCNC: 28.9 PG — SIGNIFICANT CHANGE UP (ref 27–34)
MCHC RBC-ENTMCNC: 32.2 GM/DL — SIGNIFICANT CHANGE UP (ref 32–36)
MCHC RBC-ENTMCNC: 32.3 GM/DL — SIGNIFICANT CHANGE UP (ref 32–36)
MCV RBC AUTO: 88.8 FL — SIGNIFICANT CHANGE UP (ref 80–100)
MCV RBC AUTO: 89.4 FL — SIGNIFICANT CHANGE UP (ref 80–100)
NRBC # BLD: 0 /100 WBCS — SIGNIFICANT CHANGE UP (ref 0–0)
NRBC # BLD: 0 /100 WBCS — SIGNIFICANT CHANGE UP (ref 0–0)
OB PNL STL: NEGATIVE — SIGNIFICANT CHANGE UP
PHOSPHATE SERPL-MCNC: 2 MG/DL — LOW (ref 2.5–4.5)
PHOSPHATE SERPL-MCNC: 2.2 MG/DL — LOW (ref 2.5–4.5)
PLATELET # BLD AUTO: 256 K/UL — SIGNIFICANT CHANGE UP (ref 150–400)
PLATELET # BLD AUTO: 284 K/UL — SIGNIFICANT CHANGE UP (ref 150–400)
POTASSIUM SERPL-MCNC: 3.6 MMOL/L — SIGNIFICANT CHANGE UP (ref 3.5–5.3)
POTASSIUM SERPL-MCNC: 3.7 MMOL/L — SIGNIFICANT CHANGE UP (ref 3.5–5.3)
POTASSIUM SERPL-SCNC: 3.6 MMOL/L — SIGNIFICANT CHANGE UP (ref 3.5–5.3)
POTASSIUM SERPL-SCNC: 3.7 MMOL/L — SIGNIFICANT CHANGE UP (ref 3.5–5.3)
PROT SERPL-MCNC: 3.6 G/DL — LOW (ref 6–8.3)
PROT SERPL-MCNC: 3.7 G/DL — LOW (ref 6–8.3)
PROTHROM AB SERPL-ACNC: 70.3 SEC — HIGH (ref 10–12.9)
RBC # BLD: 2.63 M/UL — LOW (ref 3.8–5.2)
RBC # BLD: 2.69 M/UL — LOW (ref 3.8–5.2)
RBC # FLD: 23.1 % — HIGH (ref 10.3–14.5)
RBC # FLD: 23.2 % — HIGH (ref 10.3–14.5)
SODIUM SERPL-SCNC: 142 MMOL/L — SIGNIFICANT CHANGE UP (ref 135–145)
SODIUM SERPL-SCNC: 143 MMOL/L — SIGNIFICANT CHANGE UP (ref 135–145)
WBC # BLD: 4.67 K/UL — SIGNIFICANT CHANGE UP (ref 3.8–10.5)
WBC # BLD: 4.88 K/UL — SIGNIFICANT CHANGE UP (ref 3.8–10.5)
WBC # FLD AUTO: 4.67 K/UL — SIGNIFICANT CHANGE UP (ref 3.8–10.5)
WBC # FLD AUTO: 4.88 K/UL — SIGNIFICANT CHANGE UP (ref 3.8–10.5)

## 2020-01-26 PROCEDURE — 99232 SBSQ HOSP IP/OBS MODERATE 35: CPT

## 2020-01-26 PROCEDURE — 74174 CTA ABD&PLVS W/CONTRAST: CPT | Mod: 26

## 2020-01-26 RX ORDER — POTASSIUM PHOSPHATE, MONOBASIC POTASSIUM PHOSPHATE, DIBASIC 236; 224 MG/ML; MG/ML
15 INJECTION, SOLUTION INTRAVENOUS ONCE
Refills: 0 | Status: COMPLETED | OUTPATIENT
Start: 2020-01-26 | End: 2020-01-26

## 2020-01-26 RX ORDER — ALBUMIN HUMAN 25 %
100 VIAL (ML) INTRAVENOUS ONCE
Refills: 0 | Status: COMPLETED | OUTPATIENT
Start: 2020-01-26 | End: 2020-01-26

## 2020-01-26 RX ORDER — CALCIUM GLUCONATE 100 MG/ML
1 VIAL (ML) INTRAVENOUS ONCE
Refills: 0 | Status: COMPLETED | OUTPATIENT
Start: 2020-01-26 | End: 2020-01-26

## 2020-01-26 RX ORDER — ALBUMIN HUMAN 25 %
100 VIAL (ML) INTRAVENOUS ONCE
Refills: 0 | Status: DISCONTINUED | OUTPATIENT
Start: 2020-01-26 | End: 2020-01-26

## 2020-01-26 RX ORDER — MAGNESIUM SULFATE 500 MG/ML
2 VIAL (ML) INJECTION ONCE
Refills: 0 | Status: COMPLETED | OUTPATIENT
Start: 2020-01-26 | End: 2020-01-26

## 2020-01-26 RX ORDER — PHYTONADIONE (VIT K1) 5 MG
5 TABLET ORAL ONCE
Refills: 0 | Status: COMPLETED | OUTPATIENT
Start: 2020-01-26 | End: 2020-01-26

## 2020-01-26 RX ORDER — PHYTONADIONE (VIT K1) 5 MG
5 TABLET ORAL ONCE
Refills: 0 | Status: DISCONTINUED | OUTPATIENT
Start: 2020-01-26 | End: 2020-01-26

## 2020-01-26 RX ADMIN — Medication 50 GRAM(S): at 22:44

## 2020-01-26 RX ADMIN — Medication 50 MILLILITER(S): at 20:01

## 2020-01-26 RX ADMIN — POTASSIUM PHOSPHATE, MONOBASIC POTASSIUM PHOSPHATE, DIBASIC 62.5 MILLIMOLE(S): 236; 224 INJECTION, SOLUTION INTRAVENOUS at 22:44

## 2020-01-26 RX ADMIN — Medication 400 GRAM(S): at 11:34

## 2020-01-26 RX ADMIN — Medication 50 MILLILITER(S): at 05:41

## 2020-01-26 RX ADMIN — SODIUM CHLORIDE 120 MILLILITER(S): 9 INJECTION, SOLUTION INTRAVENOUS at 05:41

## 2020-01-26 RX ADMIN — Medication 101 MILLIGRAM(S): at 13:05

## 2020-01-26 RX ADMIN — Medication 50 MILLILITER(S): at 11:34

## 2020-01-26 NOTE — CHART NOTE - NSCHARTNOTEFT_GEN_A_CORE
Pt comfortable  states no current abd pain  no n/v  NGT intact  ?bm, acknowledges flatus    Vital Signs Last 24 Hrs  T(C): 36.2 (26 Jan 2020 05:45), Max: 36.9 (25 Jan 2020 14:37)  T(F): 97.2 (26 Jan 2020 05:45), Max: 98.5 (25 Jan 2020 17:43)  HR: 79 (26 Jan 2020 05:45) (79 - 88)  BP: 90/56 (26 Jan 2020 05:45) (90/56 - 98/66)  BP(mean): --  RR: 17 (26 Jan 2020 05:45) (17 - 18)  SpO2: 98% (26 Jan 2020 05:45) (97% - 100%)    NGT-400    abd very softly distended, NT    case discussed with Dr Ross/Antonio  pt transferred to Dr Vandana holbrook NGT for now  f/u CT as per med  will follow  mgmt as per medical service

## 2020-01-26 NOTE — CONSULT NOTE ADULT - NEGATIVE ENMT SYMPTOMS
no hearing difficulty/no gum bleeding/no dysphagia/no nose bleeds/no dry mouth/no throat pain/no ear pain

## 2020-01-26 NOTE — CONSULT NOTE ADULT - SUBJECTIVE AND OBJECTIVE BOX
[  ] STAT REQUEST              [ X ] ROUTINE REQUEST    Patient is a 64 year old female with anemia. GI consulted to evaluate.         HPI:  64 year old female with multiple medical problems including SBO, s/p exp lap, small bowel resection, DVT and PE, on Xarelto xarelto presented with nausea, vomiting and anemia. Patient denies hematemesis, hematochezia, melena, fever, chills, chest pain, SOB, cough, hematuria, dysuria or diarrhea.          PAIN MANAGEMENT:  Pain Scale:                 0/10  Pain Location:      Prior Colonoscopy:   Unknown    PAST MEDICAL HISTORY  Pulmonary embolism  Constipation  DVT   SBO    HTN        PAST SURGICAL HISTORY  Small bowel resection  IVC filter    Allergies    No Known Allergies    Intolerances  None         MEDICATIONS  (STANDING):  albumin human 25% IVPB 100 milliLiter(s) IV Intermittent once  furosemide   Injectable 40 milliGRAM(s) IV Push daily    MEDICATIONS  (PRN):  ondansetron Injectable 4 milliGRAM(s) IV Push every 6 hours PRN Nausea      SOCIAL HISTORY  Advanced Directives:       [ X ] Full Code       [  ] DNR  Marital Status:         [  ] M      [ X ] S      [  ] D       [  ] W  Children:       [ X ] Yes      [  ] No  Occupation:        [  ] Employed       [ X ] Unemployed       [  ] Retired  Diet:       [ X ] Regular       [  ] PEG feeding          [  ] NG tube feeding  Drug Use:           [ X ] Patient denied          [  ] Yes  Alcohol:           [X  ] No             [  ] Yes (socially)         [  ] Yes (chronic)  Tobacco:           [  ] Yes           [ X ] No    FAMILY HISTORY  [X  ] Heart Disease            [  ] Diabetes             [  ] HTN             [  ] Colon Cancer             [  ] Stomach Cancer              [  ] Pancreatic Cancer      VITAL SIGNS   Vital Signs Last 24 Hrs  T(C): 36.2 (26 Jan 2020 16:45), Max: 36.4 (25 Jan 2020 21:17)  T(F): 97.2 (26 Jan 2020 16:45), Max: 97.5 (25 Jan 2020 21:17)  HR: 72 (26 Jan 2020 16:45) (61 - 87)  BP: 93/53 (26 Jan 2020 16:45) (79/48 - 98/66)   RR: 17 (26 Jan 2020 16:45) (17 - 18)  SpO2: 98% (26 Jan 2020 16:45) (98% - 100%)      CBC Full  -  ( 26 Jan 2020 11:09 )  WBC Count : 4.67 K/uL  RBC Count : 2.69 M/uL  Hemoglobin : 7.7 g/dL  Hematocrit : 23.9 %  Platelet Count - Automated : 284 K/uL  Mean Cell Volume : 88.8 fl  Mean Cell Hemoglobin : 28.6 pg  Mean Cell Hemoglobin Concentration : 32.2 gm/dL  Auto Neutrophil # : x  Auto Lymphocyte # : x  Auto Monocyte # : x  Auto Eosinophil # : x  Auto Basophil # : x  Auto Neutrophil % : x  Auto Lymphocyte % : x  Auto Monocyte % : x  Auto Eosinophil % : x  Auto Basophil % : x      01-26    143  |  112<H>  |  15  ----------------------------<  59<L>  3.7   |  20<L>  |  0.64    Ca    6.3<LL>      26 Jan 2020 14:23  Phos  2.0     01-26  Mg     1.5     01-26    TPro  3.7<L>  /  Alb  1.7<L>  /  TBili  0.5  /  DBili  x   /  AST  34  /  ALT  39  /  AlkPhos  107  01-26       PT/INR - ( 26 Jan 2020 07:10 )   PT: 70.3 sec;   INR: 5.99 ratio       PTT - ( 26 Jan 2020 07:10 )  PTT:51.5 sec    Occult Blood, Feces (01.26.20 @ 11:58)    Occult Blood, Feces: Negative    Iron with Total Binding Capacity (08.19.19 @ 10:15)    Iron - Total Binding Capacity.: 86 ug/dL    % Saturation, Iron: 69 %    Iron Total, Serum: 59 ug/dL    Unsaturated Iron Binding Capacity: 27 ug/dL    Urinalysis (08.22.19 @ 21:37)    pH Urine: 7.0    Glucose Qualitative, Urine: Negative    Blood, Urine: Negative    Color: Yellow    Urine Appearance: Clear    Bilirubin: Negative    Ketone - Urine: Small    Specific Gravity: 1.010    Protein, Urine: Negative    Urobilinogen: 1    Nitrite: Negative    Leukocyte Esterase Concentration: Trace      ECG  Ventricular Rate 93 BPM    Atrial Rate 93 BPM    P-R Interval 130 ms    QRS Duration 66 ms    Q-T Interval 344 ms    QTC Calculation(Bezet) 427 ms    P Axis 0 degrees    R Axis -11 degrees    T Axis 18 degrees    Diagnosis Line *** Poor data quality, interpretation may be adversely affected  Normal sinus rhythm  Cannot rule out Anterior infarct , age undetermined  Abnormal ECG      RADIOLOGY/IMAGING                EXAM:  CT ANGIO ABD PELV (W)AW IC                            PROCEDURE DATE:  01/26/2020          INTERPRETATION:  CLINICAL INFORMATION: Swelling. Evaluate for portal vein thrombosis. Ascites, abdominal distention, drop in hemoglobin. Evaluate for GI bleed    COMPARISON: CT abdomen pelvis 1/24 2020    PROCEDURE:   CT of the Abdomen and Pelvis was performed with and without intravenous contrast.  Precontrast, Arterial and Delayed phases were performed.  Intravenous contrast: 90 ml Omnipaque 350.10 ml discarded.  Oral contrast: None.  Sagittal and coronal reformats were performed.    Note: IV contrast extravasated into the patient's left upper extremity. On the delayed imaging IV contrast is seen within the upper arm measuring at least 3.5 x4.4 x 16.6 cm. No intravenous contrast is seen within the abdominal or pelvic vasculature markedly limiting evaluation.    FINDINGS:    LOWER CHEST: Small bilateral effusions with associated atelectasis. More prominent since the previous exam    LIVER: Within normal limits.  BILE DUCTS: Normal caliber.  GALLBLADDER: Cholelithiasis.  SPLEEN: Within normal limits.  PANCREAS: Within normal limits.  ADRENALS: Within normal limits.  KIDNEYS/URETERS: Within normal limits.    BLADDER: Cain catheter.  REPRODUCTIVE ORGANS: Uterus and adnexa within normal limits.    BOWEL: Nasogastric tube with tip in stomach. There is a small bowel anastomosis. There are dilated loops of small bowel however these are less prominent since the previous exam. Oral contrast is seen within distal small bowel and colon.  PERITONEUM: Large volume ascites  VESSELS: IVC filter.  RETROPERITONEUM/LYMPH NODES: No lymphadenopathy.    ABDOMINAL WALL: Anasarca  BONES: Within normal limits.    IMPRESSION:     Note: IV contrast extravasated into the patient's left upper extremity. On the delayed imaging IV contrast is seen within the upper arm measuring at least 3.5 x 4.4 x 16.6 cm. No intravenous contrast is seen within the abdominal or pelvic vasculature markedly limiting evaluation.    Again seen are dilated loops of small bowel, however these are less prominent since the previous exam, and oral contrast has passed distally into the colon.    Small bilateral effusions with associated atelectasis. More prominent since the previous exam large volume ascites and anasarca.    No definite evidence for hematoma.        EXAM:  CT ABDOMEN AND PELVIS IC                            PROCEDURE DATE:  01/24/2020          INTERPRETATION:  CLINICAL INFORMATION: Vomiting. History of small bowel obstruction.    COMPARISON: Prior CT scan of the abdomen and pelvis from 2/21/2019    PROCEDURE:   CT of the Abdomen and Pelvis was performed with intravenous contrast.   Intravenous contrast: 90 ml Omnipaque 350. 10 ml discarded.  Oral contrast: None.  Sagittal and coronal reformats were performed.    FINDINGS:    LOWER CHEST: Evaluation lung bases reveals small bilateral pleural effusions which was present previously. There is an NG tube in the stomach.    LIVER: Within normal limits.  BILE DUCTS: Normal caliber.  GALLBLADDER: Gallstones without gross inflammation.  SPLEEN:Within normal limits.  PANCREAS: Atrophy of the pancreas.  ADRENALS: Within normal limits.  KIDNEYS/URETERS: Within normal limits.    BLADDER: Within normal limits.  REPRODUCTIVE ORGANS: Limited in evaluation and grossly unremarkable.    BOWEL: Thereis marked distention of the small bowel which is collapsed distally consistent with small bowel obstruction. There is a swirling of the mesenteric vessels which was not seen on the previous examination and 2 points of transition seen best on the coronal sequences series 4 images 37 through 47, highly concerning for a closed loop obstruction. 2 points of transition can also be seen on the axial series 2 image 80. The cecum is collapsed and now located in the left midabdomen, where it was seen in the right lower quadrant previously. Sutures are seen in the small bowel. Please correlate clinically.  PERITONEUM: There is moderate to large ascites which has increased.  VESSELS: IVC filter is present.  RETROPERITONEUM/LYMPH NODES: No lymphadenopathy.    ABDOMINAL WALL: Subcutaneous edema which was present previously.  BONES: Degenerative changes of bone.    IMPRESSION:     Interval development of marked small bowel obstruction with 2 points of transition concerning for closed loop obstruction. Swirling of the mesenteric vessels and interval displacement of the collapsed cecum into the left side of the abdomen. This critical value was discussed with Dr. Horta in the emergency room at 8:00 PM on 1/24/2020.    NG tube. Small bilateral pleural effusions are stable. Diffuse subcutaneous emphysema.

## 2020-01-26 NOTE — CHART NOTE - NSCHARTNOTEFT_GEN_A_CORE
Spoke over the phone with Dr Sousa, radiologist. Apparently there was extravasation of IV contrast in L arm, and hence unable to assess for portal vein thrombosis w/o contrast. There's no evidence of hematoma on CT. Will do serial L arm checks.

## 2020-01-26 NOTE — PROGRESS NOTE ADULT - ASSESSMENT
65yo female with SBO, concerns for closed loop on CT, hx PE/dvt on xarelto. Patient admitted to surgery for management of SBO. Medicine consulted due to ascites. CT abdomen does not show liver cirrhosis, but will initiate albumin q6 and lasix to help initiate diuresis. Hold off on spironolactone as bp will likely not allow at present. Will pursue CTA abdomen/pelvis to r/o portal vein thrombosis.  dw pt and staff at bedside 63yo female with SBO, concerns for closed loop on CT, hx PE/dvt on xarelto. Patient admitted to surgery for management of SBO. Medicine consulted due to ascites. CT abdomen does not show liver cirrhosis, but will initiate albumin q6 and lasix to help initiate diuresis. Hold off on spironolactone as bp will likely not allow at present. Will pursue CTA abdomen/pelvis to r/o portal vein thrombosis.  dw pt and staff at bedside   NPO   IVFLUID  CHECK PT INR  AWAITING GI AND ID PLEASE   CONT WITH IV ABX

## 2020-01-26 NOTE — PROGRESS NOTE ADULT - SUBJECTIVE AND OBJECTIVE BOX
Patient was seen and examined  Patient is a 64y old  Female who presents with a chief complaint of sbo (25 Jan 2020 18:14)      INTERVAL HPI/OVERNIGHT EVENTS:  T(C): 36.2 (01-26-20 @ 05:45), Max: 36.9 (01-25-20 @ 14:37)  HR: 79 (01-26-20 @ 05:45) (79 - 88)  BP: 90/56 (01-26-20 @ 05:45) (90/56 - 98/66)  RR: 17 (01-26-20 @ 05:45) (17 - 18)  SpO2: 98% (01-26-20 @ 05:45) (97% - 100%)  Wt(kg): --  I&O's Summary    25 Jan 2020 07:01  -  26 Jan 2020 07:00  --------------------------------------------------------  IN: 2460 mL / OUT: 800 mL / NET: 1660 mL        LABS:                        10.6   5.99  )-----------( 349      ( 25 Jan 2020 07:04 )             31.9     01-26    142  |  112<H>  |  16  ----------------------------<  59<L>  3.6   |  20<L>  |  0.72    Ca    6.0<LL>      26 Jan 2020 07:10  Phos  2.2     01-26  Mg     1.8     01-26    TPro  3.6<L>  /  Alb  1.7<L>  /  TBili  0.5  /  DBili  x   /  AST  28  /  ALT  41  /  AlkPhos  105  01-26    PT/INR - ( 24 Jan 2020 22:24 )   PT: 46.4 sec;   INR: 4.00 ratio         PTT - ( 24 Jan 2020 22:24 )  PTT:43.2 sec    CAPILLARY BLOOD GLUCOSE                  MEDICATIONS  (STANDING):  albumin human 25% IVPB 50 milliLiter(s) IV Intermittent every 6 hours  furosemide   Injectable 40 milliGRAM(s) IV Push daily  lactated ringers. 1000 milliLiter(s) (120 mL/Hr) IV Continuous <Continuous>  melatonin 3 milliGRAM(s) Oral at bedtime    MEDICATIONS  (PRN):  ondansetron Injectable 4 milliGRAM(s) IV Push every 6 hours PRN Nausea      RADIOLOGY & ADDITIONAL TESTS:    Imaging Personally Reviewed:  [ ] YES  [ ] NO    REVIEW OF SYSTEMS:  CONSTITUTIONAL: No fever, weight loss, or fatigue  EYES: No eye pain, visual disturbances, or discharge  ENMT:  No difficulty hearing, tinnitus, vertigo; No sinus or throat pain  NECK: No pain or stiffness  BREASTS: No pain, masses, or nipple discharge  RESPIRATORY: No cough, wheezing, chills or hemoptysis; No shortness of breath  CARDIOVASCULAR: No chest pain, palpitations, dizziness, or leg swelling  GASTROINTESTINAL: No abdominal or epigastric pain. No nausea, vomiting, or hematemesis; No diarrhea or constipation. No melena or hematochezia.  GENITOURINARY: No dysuria, frequency, hematuria, or incontinence  NEUROLOGICAL: No headaches, memory loss, loss of strength, numbness, or tremors  SKIN: No itching, burning, rashes, or lesions   LYMPH NODES: No enlarged glands  ENDOCRINE: No heat or cold intolerance; No hair loss  MUSCULOSKELETAL: No joint pain or swelling; No muscle, back, or extremity pain  PSYCHIATRIC: No depression, anxiety, mood swings, or difficulty sleeping  HEME/LYMPH: No easy bruising, or bleeding gums  ALLERY AND IMMUNOLOGIC: No hives or eczema      Consultant(s) Notes Reviewed:  [ ] YES  [ ] NO    PHYSICAL EXAM:  GENERAL: NAD, well-groomed, well-developed  HEAD:  Atraumatic, Normocephalic  EYES: EOMI, PERRLA, conjunctiva and sclera clear  ENMT: No tonsillar erythema, exudates, or enlargement; Moist mucous membranes, Good dentition, No lesions  NECK: Supple, No JVD, Normal thyroid  NERVOUS SYSTEM:  Alert & Oriented X3, Good concentration; Motor Strength 5/5 B/L upper and lower extremities; DTRs 2+ intact and symmetric  CHEST/LUNG: Clear to percussion bilaterally; No rales, rhonchi, wheezing, or rubs  HEART: Regular rate and rhythm; No murmurs, rubs, or gallops  ABDOMEN: distended  EXTREMITIES:  2+ Peripheral Pulses, No clubbing, cyanosis, or edema  LYMPH: No lymphadenopathy noted  SKIN: No rashes or lesions    Care Discussed with Consultants/Other Providers [ x] YES  [ ] NO

## 2020-01-26 NOTE — CHART NOTE - NSCHARTNOTEFT_GEN_A_CORE
Patient noted to have drop in H/H in setting of supratherapeutic INR. Abdomen soft/no peritoneal signs/no hematoma. Patient presently denying any abdominal pain, melena, hematochezia or hematemesis.     Plan:  Holding IVF given possible liver congestion/ edematous state  Repeat CBC  Obtain type and cross and transfusion consent  Will give Vitamin K 5mg PO  CT A/P to r/o retroperitoneal hematoma Patient noted to have drop in H/H in setting of supratherapeutic INR. Abdomen soft/no peritoneal signs/no hematoma. Patient presently denying any abdominal pain, melena, hematochezia or hematemesis.     Plan:  Holding IVF given possible liver congestion/ edematous state  Repeat CBC  Obtain type and cross and transfusion consent  Will give Vitamin K 5mg PO x1  Obtain Occult blood feces  CT A/P to r/o retroperitoneal hematoma Patient noted to have drop in H/H in setting of supratherapeutic INR. Abdomen soft/no peritoneal signs/no hematoma. Patient presently denying any abdominal pain, melena, hematochezia or hematemesis.     Plan:  Holding IVF given possible liver congestion/ edematous state  Repeat CBC  Obtain type and cross and transfusion consent  Will give Vitamin K 5mg x1  Obtain Occult blood feces  CT A/P to r/o retroperitoneal hematoma Patient noted to have drop in H/H in setting of supra-therapeutic INR. Abdomen soft/no peritoneal signs/no hematoma. Patient presently denying any abdominal pain, melena, hematochezia or hematemesis. Mrs Correa is AAOx3, resting comfortably in bed, asking to eat.  I updated her about current medical status and current plan. She's agreeable. Regarding GOC, patient will prefer to remain FULL CODE. All questions answered. Will continue to follow.    Plan:  Keep NPO  Holding IVF given possible liver congestion/ edematous state  Repeat CBC  Obtain type and cross and transfusion consent  Will give Vitamin K 5mg x1  Obtain Occult blood feces  CT A/P to r/o retroperitoneal hematoma Patient noted to have drop in H/H in setting of supra-therapeutic INR. Abdomen soft/NT/no peritoneal signs/no hematoma. Vitals stable. Patient presently denying any abdominal pain, melena, hematochezia or hematemesis. Mrs Correa is AAOx3, resting comfortably in bed, asking to eat.  I updated her about current medical status and current plan. She's agreeable. Regarding GOC, patient will prefer to remain FULL CODE. All questions answered.     Patient w/ Hx hypoalbuminemia likely 2/2 malabsorption, w/ anasarca and Hx SBO w/ multiple surgeries. Baseline BP usually runs in low 90's.     Plan:  Keep NPO  DC'ed IVF given anasarca-- c/w albumin  Repeat CBC  Obtain type and cross and transfusion consent  Will give Vitamin K 5mg x1  Obtain Occult blood feces  CT A/P to r/o retroperitoneal hematoma Patient noted to have drop in H/H in setting of supra-therapeutic INR. Abdomen soft/NT/no peritoneal signs/no hematoma. Vitals stable. Patient presently denying any abdominal pain, melena, hematochezia or hematemesis. Mrs Kelsey is AAOx3, resting comfortably in bed, asking to eat.  I updated her about current medical status and current plan. She's agreeable. Regarding GOC, patient will prefer to remain FULL CODE. All questions answered.     Patient w/ Hx hypoalbuminemia likely 2/2 malabsorption, w/ anasarca and Hx SBO w/ multiple surgeries. Baseline BP usually runs in low 90's.     Plan:  Keep NPO  DC'ed IVF given anasarca-- c/w albumin  Repeat CBC  Obtain type and cross and transfusion consent  Will give Vitamin K 5mg x1  Obtain Occult blood feces  CT A/P to r/o retroperitoneal hematoma    Addendum: CT A/P appears to be negative for retroperitoneal hematoma. However, will need official report. Tried to reach out on call radiologist, Dr Sousa?, but unable to reach out. ([phone])  FOBT negative; patient hemodynamically stable  Drop in H/H could also be dilutional in setting of IVF which were discontinued.  Will transfuse 1UPRBC  Would recommend palliative consultation given guarded prognosis. Patient noted to have drop in H/H in setting of supra-therapeutic INR. Abdomen soft/NT/no peritoneal signs/no hematoma. Vitals stable. Patient presently denying any abdominal pain, melena, hematochezia or hematemesis. Mrs Kelsey is AAOx3, resting comfortably in bed, asking to eat.  I updated her about current medical status and current plan. She's agreeable. Regarding GOC, patient will prefer to remain FULL CODE. All questions answered.     Patient w/ Hx hypoalbuminemia likely 2/2 malabsorption, w/ anasarca and Hx SBO w/ multiple surgeries. Baseline BP usually runs in low 90's.     Plan:  Keep NPO  DC'ed IVF given anasarca-- c/w albumin  Repeat CBC  Obtain type and cross and transfusion consent  Will give Vitamin K 5mg x1  Obtain Occult blood feces  CT A/P to r/o retroperitoneal hematoma    Addendum: CT A/P appears to be negative for retroperitoneal hematoma. However, will need official report. Called on call radiologist, Dr Sousa?, but unable to reach out. ([phone])  FOBT negative; patient hemodynamically stable  Drop in H/H could also be dilutional in setting of IVF which were discontinued.  Will transfuse 1UPRBC  Would recommend palliative consultation given guarded prognosis.

## 2020-01-26 NOTE — PROVIDER CONTACT NOTE (CRITICAL VALUE NOTIFICATION) - SITUATION
MD will be paged through [employer]. patient calcium level yesterday was 5.9 MD will be paged through [employer]. patient calcium level yesterday was 5.9  07:52am- Spoke to MD godfrey and discussed the critical value for calcium 6.0

## 2020-01-27 DIAGNOSIS — D64.9 ANEMIA, UNSPECIFIED: ICD-10-CM

## 2020-01-27 DIAGNOSIS — E88.09 OTHER DISORDERS OF PLASMA-PROTEIN METABOLISM, NOT ELSEWHERE CLASSIFIED: ICD-10-CM

## 2020-01-27 DIAGNOSIS — I82.409 ACUTE EMBOLISM AND THROMBOSIS OF UNSPECIFIED DEEP VEINS OF UNSPECIFIED LOWER EXTREMITY: ICD-10-CM

## 2020-01-27 LAB
ALBUMIN SERPL ELPH-MCNC: 1.9 G/DL — LOW (ref 3.5–5)
ALP SERPL-CCNC: 128 U/L — HIGH (ref 40–120)
ALT FLD-CCNC: 38 U/L DA — SIGNIFICANT CHANGE UP (ref 10–60)
AMMONIA BLD-MCNC: 43 UMOL/L — HIGH (ref 11–32)
ANION GAP SERPL CALC-SCNC: 12 MMOL/L — SIGNIFICANT CHANGE UP (ref 5–17)
APTT BLD: 37.4 SEC — HIGH (ref 27.5–36.3)
AST SERPL-CCNC: 27 U/L — SIGNIFICANT CHANGE UP (ref 10–40)
BASOPHILS # BLD AUTO: 0.01 K/UL — SIGNIFICANT CHANGE UP (ref 0–0.2)
BASOPHILS NFR BLD AUTO: 0.2 % — SIGNIFICANT CHANGE UP (ref 0–2)
BILIRUB SERPL-MCNC: 1.1 MG/DL — SIGNIFICANT CHANGE UP (ref 0.2–1.2)
BUN SERPL-MCNC: 10 MG/DL — SIGNIFICANT CHANGE UP (ref 7–18)
CALCIUM SERPL-MCNC: 6.8 MG/DL — LOW (ref 8.4–10.5)
CHLORIDE SERPL-SCNC: 112 MMOL/L — HIGH (ref 96–108)
CO2 SERPL-SCNC: 18 MMOL/L — LOW (ref 22–31)
CREAT SERPL-MCNC: 0.58 MG/DL — SIGNIFICANT CHANGE UP (ref 0.5–1.3)
EOSINOPHIL # BLD AUTO: 0.02 K/UL — SIGNIFICANT CHANGE UP (ref 0–0.5)
EOSINOPHIL NFR BLD AUTO: 0.4 % — SIGNIFICANT CHANGE UP (ref 0–6)
GLUCOSE SERPL-MCNC: 52 MG/DL — LOW (ref 70–99)
HAPTOGLOB SERPL-MCNC: 59 MG/DL — SIGNIFICANT CHANGE UP (ref 34–200)
HCT VFR BLD CALC: 27.4 % — LOW (ref 34.5–45)
HCT VFR BLD CALC: 28.7 % — LOW (ref 34.5–45)
HGB BLD-MCNC: 9.1 G/DL — LOW (ref 11.5–15.5)
HGB BLD-MCNC: 9.5 G/DL — LOW (ref 11.5–15.5)
IMM GRANULOCYTES NFR BLD AUTO: 0.4 % — SIGNIFICANT CHANGE UP (ref 0–1.5)
INR BLD: 1.3 RATIO — HIGH (ref 0.88–1.16)
LDH SERPL L TO P-CCNC: 230 U/L — HIGH (ref 120–225)
LYMPHOCYTES # BLD AUTO: 0.91 K/UL — LOW (ref 1–3.3)
LYMPHOCYTES # BLD AUTO: 18.7 % — SIGNIFICANT CHANGE UP (ref 13–44)
MAGNESIUM SERPL-MCNC: 2.2 MG/DL — SIGNIFICANT CHANGE UP (ref 1.6–2.6)
MCHC RBC-ENTMCNC: 28.8 PG — SIGNIFICANT CHANGE UP (ref 27–34)
MCHC RBC-ENTMCNC: 29.4 PG — SIGNIFICANT CHANGE UP (ref 27–34)
MCHC RBC-ENTMCNC: 33.1 GM/DL — SIGNIFICANT CHANGE UP (ref 32–36)
MCHC RBC-ENTMCNC: 33.2 GM/DL — SIGNIFICANT CHANGE UP (ref 32–36)
MCV RBC AUTO: 87 FL — SIGNIFICANT CHANGE UP (ref 80–100)
MCV RBC AUTO: 88.7 FL — SIGNIFICANT CHANGE UP (ref 80–100)
MONOCYTES # BLD AUTO: 0.27 K/UL — SIGNIFICANT CHANGE UP (ref 0–0.9)
MONOCYTES NFR BLD AUTO: 5.6 % — SIGNIFICANT CHANGE UP (ref 2–14)
NEUTROPHILS # BLD AUTO: 3.63 K/UL — SIGNIFICANT CHANGE UP (ref 1.8–7.4)
NEUTROPHILS NFR BLD AUTO: 74.7 % — SIGNIFICANT CHANGE UP (ref 43–77)
NRBC # BLD: 0 /100 WBCS — SIGNIFICANT CHANGE UP (ref 0–0)
NRBC # BLD: 0 /100 WBCS — SIGNIFICANT CHANGE UP (ref 0–0)
PHOSPHATE SERPL-MCNC: 2.1 MG/DL — LOW (ref 2.5–4.5)
PLATELET # BLD AUTO: 238 K/UL — SIGNIFICANT CHANGE UP (ref 150–400)
PLATELET # BLD AUTO: 241 K/UL — SIGNIFICANT CHANGE UP (ref 150–400)
POTASSIUM SERPL-MCNC: 3.3 MMOL/L — LOW (ref 3.5–5.3)
POTASSIUM SERPL-SCNC: 3.3 MMOL/L — LOW (ref 3.5–5.3)
PROT SERPL-MCNC: 3.7 G/DL — LOW (ref 6–8.3)
PROTHROM AB SERPL-ACNC: 14.5 SEC — HIGH (ref 10–12.9)
RBC # BLD: 3.09 M/UL — LOW (ref 3.8–5.2)
RBC # BLD: 3.3 M/UL — LOW (ref 3.8–5.2)
RBC # BLD: 3.3 M/UL — LOW (ref 3.8–5.2)
RBC # FLD: 21.2 % — HIGH (ref 10.3–14.5)
RBC # FLD: 21.3 % — HIGH (ref 10.3–14.5)
RETICS #: 91.4 K/UL — SIGNIFICANT CHANGE UP (ref 25–125)
RETICS/RBC NFR: 2.8 % — HIGH (ref 0.5–2.5)
SODIUM SERPL-SCNC: 142 MMOL/L — SIGNIFICANT CHANGE UP (ref 135–145)
WBC # BLD: 4.27 K/UL — SIGNIFICANT CHANGE UP (ref 3.8–10.5)
WBC # BLD: 4.86 K/UL — SIGNIFICANT CHANGE UP (ref 3.8–10.5)
WBC # FLD AUTO: 4.27 K/UL — SIGNIFICANT CHANGE UP (ref 3.8–10.5)
WBC # FLD AUTO: 4.86 K/UL — SIGNIFICANT CHANGE UP (ref 3.8–10.5)

## 2020-01-27 PROCEDURE — 99232 SBSQ HOSP IP/OBS MODERATE 35: CPT

## 2020-01-27 PROCEDURE — 99231 SBSQ HOSP IP/OBS SF/LOW 25: CPT

## 2020-01-27 PROCEDURE — 93970 EXTREMITY STUDY: CPT | Mod: 26

## 2020-01-27 RX ORDER — POTASSIUM PHOSPHATE, MONOBASIC POTASSIUM PHOSPHATE, DIBASIC 236; 224 MG/ML; MG/ML
15 INJECTION, SOLUTION INTRAVENOUS ONCE
Refills: 0 | Status: COMPLETED | OUTPATIENT
Start: 2020-01-27 | End: 2020-01-27

## 2020-01-27 RX ORDER — DEXTROSE 50 % IN WATER 50 %
12.5 SYRINGE (ML) INTRAVENOUS ONCE
Refills: 0 | Status: DISCONTINUED | OUTPATIENT
Start: 2020-01-27 | End: 2020-01-30

## 2020-01-27 RX ORDER — SODIUM CHLORIDE 9 MG/ML
1000 INJECTION, SOLUTION INTRAVENOUS
Refills: 0 | Status: DISCONTINUED | OUTPATIENT
Start: 2020-01-27 | End: 2020-01-30

## 2020-01-27 RX ORDER — SODIUM CHLORIDE 9 MG/ML
1000 INJECTION, SOLUTION INTRAVENOUS
Refills: 0 | Status: DISCONTINUED | OUTPATIENT
Start: 2020-01-27 | End: 2020-01-28

## 2020-01-27 RX ORDER — DEXTROSE 50 % IN WATER 50 %
15 SYRINGE (ML) INTRAVENOUS ONCE
Refills: 0 | Status: DISCONTINUED | OUTPATIENT
Start: 2020-01-27 | End: 2020-01-30

## 2020-01-27 RX ORDER — POTASSIUM CHLORIDE 20 MEQ
10 PACKET (EA) ORAL
Refills: 0 | Status: COMPLETED | OUTPATIENT
Start: 2020-01-27 | End: 2020-01-27

## 2020-01-27 RX ORDER — GLUCAGON INJECTION, SOLUTION 0.5 MG/.1ML
1 INJECTION, SOLUTION SUBCUTANEOUS ONCE
Refills: 0 | Status: DISCONTINUED | OUTPATIENT
Start: 2020-01-27 | End: 2020-01-30

## 2020-01-27 RX ORDER — DEXTROSE 50 % IN WATER 50 %
25 SYRINGE (ML) INTRAVENOUS ONCE
Refills: 0 | Status: DISCONTINUED | OUTPATIENT
Start: 2020-01-27 | End: 2020-01-30

## 2020-01-27 RX ADMIN — Medication 100 MILLIEQUIVALENT(S): at 11:55

## 2020-01-27 RX ADMIN — POTASSIUM PHOSPHATE, MONOBASIC POTASSIUM PHOSPHATE, DIBASIC 62.5 MILLIMOLE(S): 236; 224 INJECTION, SOLUTION INTRAVENOUS at 10:32

## 2020-01-27 RX ADMIN — SODIUM CHLORIDE 75 MILLILITER(S): 9 INJECTION, SOLUTION INTRAVENOUS at 21:57

## 2020-01-27 RX ADMIN — Medication 100 MILLIEQUIVALENT(S): at 14:05

## 2020-01-27 RX ADMIN — Medication 100 MILLIEQUIVALENT(S): at 11:16

## 2020-01-27 NOTE — CONSULT NOTE ADULT - NEGATIVE GASTROINTESTINAL SYMPTOMS
no diarrhea/no nausea/no abdominal pain/no vomiting
no vomiting/no hematochezia/no hiccoughs/no nausea/no diarrhea/no melena/no jaundice/no abdominal pain

## 2020-01-27 NOTE — DIETITIAN INITIAL EVALUATION ADULT. - DIET TYPE
PTA - pre pt. consuming small meals & Ensure Regular, 8oz 1-2 bottle daily recommend advance diet to Soft, low sodium diet as medically feasible recommend advance diet to Soft, low sodium diet with Ensure Enlive 1 can BID (700 Kcal, Protein 40 grams) as medically feasible

## 2020-01-27 NOTE — PROGRESS NOTE ADULT - ASSESSMENT
65yo female with SBO, concerns for closed loop on CT, hx PE/dvt on xarelto. Patient admitted to surgery for management of SBO. Medicine consulted due to ascites. CT abdomen does not show liver cirrhosis, but will initiate albumin q6 and lasix to help initiate diuresis. Hold off on spironolactone as bp will likely not allow at present. Will pursue CTA abdomen/pelvis to r/o portal vein thrombosis.  REPLACE K PHOS

## 2020-01-27 NOTE — PROGRESS NOTE ADULT - NEGATIVE ENMT SYMPTOMS
no dysphagia/no gum bleeding/no hearing difficulty/no dry mouth/no throat pain/no nose bleeds/no ear pain

## 2020-01-27 NOTE — PROGRESS NOTE ADULT - ASSESSMENT
1. Anemia  2. No evidence of acute GI bleeding  3. Portal vein thrombosis  4. Ascites  5. SBO    Suggestions:    1. Monitor H/H  2. Transfuse PRBC as needed  3. Protonix daily  4. Vascular surgery follow up  5. Monitor electrolytes  6. Consider Albumin transfusion  7. Consider therapeutic paracentesis  8. Low salt diet  9. General surgery follow up  10. Avoid NSAID  11. DVT prophylaxis

## 2020-01-27 NOTE — DIETITIAN INITIAL EVALUATION ADULT. - ADD RECOMMEND
Advance diet with nutrition supplement as medically feasible, presently add Ensure Clear 1 can TID (600 Kcal, Protein 21 grams) with MVI/minerals daily as medically feasible Advance diet with nutrition supplement as medically feasible, presently add Ensure Clear 1 can TID (600 Kcal, Protein 21 grams) to Clear Liquid diet with MVI/minerals daily as medically feasible

## 2020-01-27 NOTE — CONSULT NOTE ADULT - ASSESSMENT
63yo female with SBO, concerns for closed loop on CT, hx PE/dvt on xarelto. Patient admitted to surgery for management of SBO. Medicine consulted due to ascites. CT abdomen does not show liver cirrhosis, but will initiate albumin q6 and lasix to help initiate diuresis. Hold off on spironolactone as bp will likely not allow at present. Will pursue CTA abdomen/pelvis to r/o portal vein thrombosis.
64 year old lady with multiple surgery for repeated SBO that caused malabsorption with low albumin, edema and ascites.  She has been anemic with baseline Hb about 8 wasa dimmted for werakness.  Hb dropped from 10 to 7 without bleeding noticed.
R/O Spontaneous Bacterial Peritonitis - no clinical evidence or symptomatology of this.    Plan - no need for abxs at this time  reconsult prn.
1. Anemia  2. No evidence of acute GI bleeding  3. Portal vein thrombosis  4. Ascites  5. SBO    Suggestions:    1. Monitor H/H  2. Transfuse PRBC as needed  3. Protonix daily  4. Vascular surgery evaluation  5. Monitor electrolytes  6. Consider Albumin transfusion  7. Consider therapeutic paracentesis  8. Low salt diet  9. General surgery follow up  10. Avoid NSAID  11. DVT prophylaxis

## 2020-01-27 NOTE — CONSULT NOTE ADULT - PROBLEM SELECTOR RECOMMENDATION 2
short bowel syndrome causing malabsorption  the low albumin caused ascites and edema  can give albumin and lasix for diuresis

## 2020-01-27 NOTE — DIETITIAN INITIAL EVALUATION ADULT. - OTHER INFO
Patient from home lives with daughter. Visited pt. alert, s/p NGT & NPO with IV fluids, per RN, plans to advance pt. diet to clear liquid at lunch per surgery noted. Per pt. po intake poor for 1 week, had nausea/vomiting x1day PTA, consuming small meals - 2-3 spoonful of rice or past with small portion of meats & may have a bottle of Ensure Regular once per day at home, denies chewing/swallowing difficulty, stated  Lbs & recently weighed at her PCP office at 132 Lbs, loss 8 Lbs past week but retaining fluid with "feeling of fullness". Pt. followed by surgery & GI/team, pt. requested nutrition education & give information & copy on "high protein/calories" nutrition therapy, reviewed & reinforced information provided. Discussed with RN, pt. willing to have nutrition supplement & encourage po intake once diet advances, skin intact. Patient from home lives with daughter. Visited pt. alert, s/p NGT & NPO with IV fluids, per RN, plans to advance pt. diet to clear liquid at lunch per surgery noted. Per pt. po intake poor for 1 week, had nausea/vomiting x1day PTA, consuming small meals - 2-3 spoonful of rice or pasta with small portion of meats & may have a bottle of Ensure Regular once per day at home, denies chewing/swallowing difficulty, stated  Lbs & recently weighed at her PCP office at 132 Lbs, loss 8 Lbs past week but retaining fluid with "feeling of fullness". Pt. followed by surgery & GI/team, pt. requested nutrition education & give information & copy on "high protein/calories" nutrition therapy, reviewed & reinforced information provided. Discussed with RN, pt. willing to have nutrition supplement & encourage po intake once diet advances, skin intact.

## 2020-01-27 NOTE — DIETITIAN INITIAL EVALUATION ADULT. - PERTINENT LABORATORY DATA
01-27 Na142 mmol/L Glu 52 mg/dL<L> K+ 3.3 mmol/L<L> Cr  0.58 mg/dL BUN 10 mg/dL 01-27 Phos 2.1 mg/dL<L> 01-27 Alb 1.9 g/dL<L>

## 2020-01-27 NOTE — PROGRESS NOTE ADULT - ASSESSMENT
64y old Female with small bowel obstruction, hypoglycemia, hypokalemia, hypophosphatemia    - remove NGT  - Kphos ordered  - hypoglycemia protocol ordered  -   - DVT prophylaxis, Incentive Spirometer, OOB, Ambulating, pain control 64y old Female with small bowel obstruction, hypoglycemia, hypokalemia, hypophosphatemia    - remove NGT  - Kphos ordered  - hypoglycemia protocol ordered  - IVF started  - possible advance to clears  - medical management

## 2020-01-27 NOTE — CONSULT NOTE ADULT - GASTROINTESTINAL DETAILS
no guarding/soft/no masses palpable/bowel sounds normal/no rebound tenderness/no rigidity/no organomegaly/nontender
nontender/bowel sounds normal/no guarding/soft/no rebound tenderness/no rigidity

## 2020-01-27 NOTE — CONSULT NOTE ADULT - SUBJECTIVE AND OBJECTIVE BOX
Asked to reeval pt w h/o DVT/PE  s/p IVC filter (retrievable) 1/25/19: Had DVT/PE - AC was contraidicated for GI surgery.  No fu  Now w recurrent GI probs.  Was on xarelto outpt  Ch LE edema, S/NT    CT A/P reviewed: ?SBO. IVC filter in place.  IVC collapsed.  Subotimal for eval of IVC patenty but Contrast seen in lumen,  vUS LEs: ch DVt L    Rec:   COnt med/surg mgmt  LE elevation, compression stockings post DC  US of abd to eval IVC patency- if not conclusive then will need CTV of abd/pelvis  FU once stable if filter can be retrieved.

## 2020-01-27 NOTE — CHART NOTE - NSCHARTNOTEFT_GEN_A_CORE
Upon Nutritional Assessment by the Registered Dietitian your patient was determined to meet criteria / has evidence of the following diagnosis/diagnoses:          [ ]  Mild Protein Calorie Malnutrition        [ ]  Moderate Protein Calorie Malnutrition        [ X] Severe Protein Calorie Malnutrition        [ ] Unspecified Protein Calorie Malnutrition        [ ] Underweight / BMI <19        [ ] Morbid Obesity / BMI > 40      Findings as based on:  •  Comprehensive nutrition assessment and consultation  •  Calorie counts (nutrient intake analysis)  •  Food acceptance and intake status from observations by staff  •  Follow up  •  Patient education  •  Intervention secondary to interdisciplinary rounds  •   concerns      Treatment:    The following diet has been recommended:      PROVIDER Section:     By signing this assessment you are acknowledging and agree with the diagnosis/diagnoses assigned by the Registered Dietitian    Comments:  Add Ensure Clear 1 can TID (600 Kcal, Protein 21 grams) & MVI/minerals daily as medically feasible

## 2020-01-27 NOTE — PROGRESS NOTE ADULT - NEGATIVE GASTROINTESTINAL SYMPTOMS
no diarrhea/no melena/no hematochezia/no hiccoughs/no nausea/no abdominal pain/no vomiting/no jaundice

## 2020-01-27 NOTE — PROGRESS NOTE ADULT - SUBJECTIVE AND OBJECTIVE BOX
[   ] ICU                                          [   ] CCU                                      [  X ] Medical Floor      Patient is comfortable. No new complaints reported, No abdominal pain, N/V, hematemesis, hematochezia, melena, fever, chills, chest pain, SOB, cough or diarrhea reported.    VITALS  Vital Signs Last 24 Hrs  T(C): 36.4 (27 Jan 2020 14:36), Max: 36.4 (27 Jan 2020 14:36)  T(F): 97.6 (27 Jan 2020 14:36), Max: 97.6 (27 Jan 2020 14:36)  HR: 70 (27 Jan 2020 14:36) (64 - 70)  BP: 95/63 (27 Jan 2020 14:36) (94/59 - 105/54)   RR: 16 (27 Jan 2020 14:36) (16 - 17)  SpO2: 100% (27 Jan 2020 14:36) (97% - 100%)       MEDICATIONS  (STANDING):  dextrose 5% + sodium chloride 0.45%. 1000 milliLiter(s) (75 mL/Hr) IV Continuous <Continuous>  dextrose 5%. 1000 milliLiter(s) (50 mL/Hr) IV Continuous <Continuous>  dextrose 50% Injectable 12.5 Gram(s) IV Push once  dextrose 50% Injectable 25 Gram(s) IV Push once  dextrose 50% Injectable 25 Gram(s) IV Push once  furosemide   Injectable 40 milliGRAM(s) IV Push daily    MEDICATIONS  (PRN):  dextrose 40% Gel 15 Gram(s) Oral once PRN Blood Glucose LESS THAN 70 milliGRAM(s)/deciLiter  glucagon  Injectable 1 milliGRAM(s) IntraMuscular once PRN Glucose <70 milliGRAM(s)/deciLiter  ondansetron Injectable 4 milliGRAM(s) IV Push every 6 hours PRN Nausea                            9.5    4.27  )-----------( 241      ( 27 Jan 2020 08:24 )             28.7       01-27    142  |  112<H>  |  10  ----------------------------<  52<L>  3.3<L>   |  18<L>  |  0.58    Ca    6.8<L>      27 Jan 2020 08:24  Phos  2.1     01-27  Mg     2.2     01-27    TPro  3.7<L>  /  Alb  1.9<L>  /  TBili  1.1  /  DBili  x   /  AST  27  /  ALT  38  /  AlkPhos  128<H>  01-27      PT/INR - ( 27 Jan 2020 08:24 )   PT: 14.5 sec;   INR: 1.30 ratio         PTT - ( 27 Jan 2020 08:24 )  PTT:37.4 sec

## 2020-01-27 NOTE — CONSULT NOTE ADULT - RS GEN PE MLT RESP DETAILS PC
no rales/clear to auscultation bilaterally/no wheezes/good air movement/no rhonchi/breath sounds equal
breath sounds equal/no wheezes/airway patent/good air movement

## 2020-01-27 NOTE — DIETITIAN INITIAL EVALUATION ADULT. - PERTINENT MEDS FT
MEDICATIONS  (STANDING):  dextrose 5% + sodium chloride 0.45%. 1000 milliLiter(s) (75 mL/Hr) IV Continuous <Continuous>  dextrose 5%. 1000 milliLiter(s) (50 mL/Hr) IV Continuous <Continuous>  dextrose 50% Injectable 12.5 Gram(s) IV Push once  dextrose 50% Injectable 25 Gram(s) IV Push once  dextrose 50% Injectable 25 Gram(s) IV Push once  furosemide   Injectable 40 milliGRAM(s) IV Push daily  potassium chloride  10 mEq/100 mL IVPB 10 milliEquivalent(s) IV Intermittent every 1 hour    MEDICATIONS  (PRN):  dextrose 40% Gel 15 Gram(s) Oral once PRN Blood Glucose LESS THAN 70 milliGRAM(s)/deciLiter  glucagon  Injectable 1 milliGRAM(s) IntraMuscular once PRN Glucose <70 milliGRAM(s)/deciLiter  ondansetron Injectable 4 milliGRAM(s) IV Push every 6 hours PRN Nausea

## 2020-01-27 NOTE — CONSULT NOTE ADULT - SUBJECTIVE AND OBJECTIVE BOX
HPI:  65yo female with hx of recurrent SBO's, s/p exp lap, SB resection in 2015, ex lap, ALBINA in 2018, DVT, PE, on xarelto, IVC filter, chronic leg swelling, and anasarca p/w generalized weakness, nausea, vomiting x 1 day. Reports last BM last night.  Denies any abd pain, fever, cp, sob, diarrhea or sick contacts. (24 Jan 2020 20:01)      PAST MEDICAL & SURGICAL HISTORY:  Pulmonary embolism  DVT (deep venous thrombosis)  SBO (small bowel obstruction)  History of intestinal surgery      No Known Allergies      Meds:  dextrose 40% Gel 15 Gram(s) Oral once PRN  dextrose 5% + sodium chloride 0.45%. 1000 milliLiter(s) IV Continuous <Continuous>  dextrose 5%. 1000 milliLiter(s) IV Continuous <Continuous>  dextrose 50% Injectable 12.5 Gram(s) IV Push once  dextrose 50% Injectable 25 Gram(s) IV Push once  dextrose 50% Injectable 25 Gram(s) IV Push once  furosemide   Injectable 40 milliGRAM(s) IV Push daily  glucagon  Injectable 1 milliGRAM(s) IntraMuscular once PRN  ondansetron Injectable 4 milliGRAM(s) IV Push every 6 hours PRN      SOCIAL HISTORY:  Smoker:  YES / NO        PACK YEARS:                         WHEN QUIT?  ETOH use:  YES / NO               FREQUENCY / QUANTITY:  Ilicit Drug use:  YES / NO  Occupation:  Assisted device use (Cane / Walker):  Live with:    FAMILY HISTORY:  No pertinent family history in first degree relatives      VITALS:  Vital Signs Last 24 Hrs  T(C): 36.4 (27 Jan 2020 14:36), Max: 36.4 (26 Jan 2020 20:00)  T(F): 97.6 (27 Jan 2020 14:36), Max: 97.6 (27 Jan 2020 14:36)  HR: 70 (27 Jan 2020 14:36) (62 - 70)  BP: 95/63 (27 Jan 2020 14:36) (94/59 - 105/54)  BP(mean): --  RR: 16 (27 Jan 2020 14:36) (15 - 17)  SpO2: 100% (27 Jan 2020 14:36) (97% - 100%)    LABS/DIAGNOSTIC TESTS:                          9.5    4.27  )-----------( 241      ( 27 Jan 2020 08:24 )             28.7     WBC Count: 4.27 K/uL (01-27 @ 08:24)  WBC Count: 4.86 K/uL (01-27 @ 00:18)  WBC Count: 4.67 K/uL (01-26 @ 11:09)  WBC Count: 4.88 K/uL (01-26 @ 07:10)  WBC Count: 5.99 K/uL (01-25 @ 07:04)      01-27    142  |  112<H>  |  10  ----------------------------<  52<L>  3.3<L>   |  18<L>  |  0.58    Ca    6.8<L>      27 Jan 2020 08:24  Phos  2.1     01-27  Mg     2.2     01-27    TPro  3.7<L>  /  Alb  1.9<L>  /  TBili  1.1  /  DBili  x   /  AST  27  /  ALT  38  /  AlkPhos  128<H>  01-27          LIVER FUNCTIONS - ( 27 Jan 2020 08:24 )  Alb: 1.9 g/dL / Pro: 3.7 g/dL / ALK PHOS: 128 U/L / ALT: 38 U/L DA / AST: 27 U/L / GGT: x             PT/INR - ( 27 Jan 2020 08:24 )   PT: 14.5 sec;   INR: 1.30 ratio         PTT - ( 27 Jan 2020 08:24 )  PTT:37.4 sec    LACTATE:    ABG -     CULTURES:       RADIOLOGY:< from: CT Abdomen and Pelvis w/ IV Cont (01.24.20 @ 19:30) >  EXAM:  CT ABDOMEN AND PELVIS IC                            PROCEDURE DATE:  01/24/2020          INTERPRETATION:  CLINICAL INFORMATION: Vomiting. History of small bowel obstruction.    COMPARISON: Prior CT scan of the abdomen and pelvis from 2/21/2019    PROCEDURE:   CT of the Abdomen and Pelvis was performed with intravenous contrast.   Intravenous contrast: 90 ml Omnipaque 350. 10 ml discarded.  Oral contrast: None.  Sagittal and coronal reformats were performed.    FINDINGS:    LOWER CHEST: Evaluation lung bases reveals small bilateral pleural effusions which was present previously. There is an NG tube in the stomach.    LIVER: Within normal limits.  BILE DUCTS: Normal caliber.  GALLBLADDER: Gallstones without gross inflammation.  SPLEEN:Within normal limits.  PANCREAS: Atrophy of the pancreas.  ADRENALS: Within normal limits.  KIDNEYS/URETERS: Within normal limits.    BLADDER: Within normal limits.  REPRODUCTIVE ORGANS: Limited in evaluation and grossly unremarkable.    BOWEL: Thereis marked distention of the small bowel which is collapsed distally consistent with small bowel obstruction. There is a swirling of the mesenteric vessels which was not seen on the previous examination and 2 points of transition seen best on the coronal sequences series 4 images 37 through 47, highly concerning for a closed loop obstruction. 2 points of transition can also be seen on the axial series 2 image 80. The cecum is collapsed and now located in the left midabdomen, where it was seen in the right lower quadrant previously. Sutures are seen in the small bowel. Please correlate clinically.  PERITONEUM: There is moderate to large ascites which has increased.  VESSELS: IVC filter is present.  RETROPERITONEUM/LYMPH NODES: No lymphadenopathy.    ABDOMINAL WALL: Subcutaneous edema which was present previously.  BONES: Degenerative changes of bone.    IMPRESSION:     Interval development of marked small bowel obstruction with 2 points of transition concerning for closed loop obstruction. Swirling of the mesenteric vessels and interval displacement of the collapsed cecum into the left side of the abdomen. This critical value was discussed with Dr. Horta in the emergency room at 8:00 PM on 1/24/2020.    NG tube. Small bilateral pleural effusions are stable. Diffuse subcutaneous emphysema.          PRAMOD CHAVIS M.D., ATTENDING RADIOLOGIST  This document has been electronically signed. Jan 24 2020  8:05PM        --------------------------------------------------------------------------------------------------------------------------------------------    < from: Xray Chest 1 View AP/PA (01.24.20 @ 17:26) >  EXAM:  XR CHEST AP OR PA 1V                            PROCEDURE DATE:  01/24/2020          INTERPRETATION:  CLINICAL INDICATION: 64 years  Female with vomiting.    COMPARISON: None    AP view of the chest demonstrates the lungs to be clear. There is no pleural effusion. There is no pneumothorax.    The heart is normal in size. There is no mediastinal or hilar mass.     The pulmonary vasculature is normal.     Mild thoracic degenerative changes are present.    Markedly dilated large and small bowel with air-fluid levels are present in the abdomen suggestive of bowel obstruction. Please refer to the accompanying Abdomen xray report.    IMPRESSION:    No acute infiltrate.    Suspect large or small bowel obstruction or a please refer to the accompanying Abdomen xray report.    Findings discussed with Dr. Sandra Allan at 1/24/2020 6:09 PM with readback.                SHAHBAZ PEÑALOZA M.D., ATTENDING RADIOLOGIST  This document has been electronically signed. Jan 24 2020  6:09PM          < end of copied text >      ROS  [  ] UNABLE TO ELICIT HPI:  65yo female with hx of recurrent SBO's, s/p exp lap, SB resection in 2015, ex lap, ALBINA in 2018, DVT, PE, on xarelto, IVC filter, chronic leg swelling, and anasarca p/w generalized weakness, nausea, vomiting x 1 day. Reports last BM last night.  Denies any abd pain, fever, cp, sob, diarrhea or sick contacts. (24 Jan 2020 20:01)    History as above, asked to see patient as she had some abdominal pain and ascites and to see if she has SBP, she has Bud Chiari syndrome and hence has ascites, she no longer has abdominal pain , she has no fevers or chills and has no Leukocytosis. Her NGT was removed and she was started on clears, she states she is passing some flatus. Her abxs were already stopped.       PAST MEDICAL & SURGICAL HISTORY:  Pulmonary embolism  DVT (deep venous thrombosis)  SBO (small bowel obstruction)  History of intestinal surgery      No Known Allergies      Meds:  dextrose 40% Gel 15 Gram(s) Oral once PRN  dextrose 5% + sodium chloride 0.45%. 1000 milliLiter(s) IV Continuous <Continuous>  dextrose 5%. 1000 milliLiter(s) IV Continuous <Continuous>  dextrose 50% Injectable 12.5 Gram(s) IV Push once  dextrose 50% Injectable 25 Gram(s) IV Push once  dextrose 50% Injectable 25 Gram(s) IV Push once  furosemide   Injectable 40 milliGRAM(s) IV Push daily  glucagon  Injectable 1 milliGRAM(s) IntraMuscular once PRN  ondansetron Injectable 4 milliGRAM(s) IV Push every 6 hours PRN      SOCIAL HISTORY:  Smoker:  no  ETOH use:  no    FAMILY HISTORY:  No pertinent family history in first degree relatives      VITALS:  Vital Signs Last 24 Hrs  T(C): 36.4 (27 Jan 2020 14:36), Max: 36.4 (26 Jan 2020 20:00)  T(F): 97.6 (27 Jan 2020 14:36), Max: 97.6 (27 Jan 2020 14:36)  HR: 70 (27 Jan 2020 14:36) (62 - 70)  BP: 95/63 (27 Jan 2020 14:36) (94/59 - 105/54)  BP(mean): --  RR: 16 (27 Jan 2020 14:36) (15 - 17)  SpO2: 100% (27 Jan 2020 14:36) (97% - 100%)    LABS/DIAGNOSTIC TESTS:                          9.5    4.27  )-----------( 241      ( 27 Jan 2020 08:24 )             28.7     WBC Count: 4.27 K/uL (01-27 @ 08:24)  WBC Count: 4.86 K/uL (01-27 @ 00:18)  WBC Count: 4.67 K/uL (01-26 @ 11:09)  WBC Count: 4.88 K/uL (01-26 @ 07:10)  WBC Count: 5.99 K/uL (01-25 @ 07:04)      01-27    142  |  112<H>  |  10  ----------------------------<  52<L>  3.3<L>   |  18<L>  |  0.58    Ca    6.8<L>      27 Jan 2020 08:24  Phos  2.1     01-27  Mg     2.2     01-27    TPro  3.7<L>  /  Alb  1.9<L>  /  TBili  1.1  /  DBili  x   /  AST  27  /  ALT  38  /  AlkPhos  128<H>  01-27          LIVER FUNCTIONS - ( 27 Jan 2020 08:24 )  Alb: 1.9 g/dL / Pro: 3.7 g/dL / ALK PHOS: 128 U/L / ALT: 38 U/L DA / AST: 27 U/L / GGT: x             PT/INR - ( 27 Jan 2020 08:24 )   PT: 14.5 sec;   INR: 1.30 ratio         PTT - ( 27 Jan 2020 08:24 )  PTT:37.4 sec    LACTATE:    ABG -     CULTURES:       RADIOLOGY:< from: CT Abdomen and Pelvis w/ IV Cont (01.24.20 @ 19:30) >  EXAM:  CT ABDOMEN AND PELVIS IC                            PROCEDURE DATE:  01/24/2020          INTERPRETATION:  CLINICAL INFORMATION: Vomiting. History of small bowel obstruction.    COMPARISON: Prior CT scan of the abdomen and pelvis from 2/21/2019    PROCEDURE:   CT of the Abdomen and Pelvis was performed with intravenous contrast.   Intravenous contrast: 90 ml Omnipaque 350. 10 ml discarded.  Oral contrast: None.  Sagittal and coronal reformats were performed.    FINDINGS:    LOWER CHEST: Evaluation lung bases reveals small bilateral pleural effusions which was present previously. There is an NG tube in the stomach.    LIVER: Within normal limits.  BILE DUCTS: Normal caliber.  GALLBLADDER: Gallstones without gross inflammation.  SPLEEN:Within normal limits.  PANCREAS: Atrophy of the pancreas.  ADRENALS: Within normal limits.  KIDNEYS/URETERS: Within normal limits.    BLADDER: Within normal limits.  REPRODUCTIVE ORGANS: Limited in evaluation and grossly unremarkable.    BOWEL: Thereis marked distention of the small bowel which is collapsed distally consistent with small bowel obstruction. There is a swirling of the mesenteric vessels which was not seen on the previous examination and 2 points of transition seen best on the coronal sequences series 4 images 37 through 47, highly concerning for a closed loop obstruction. 2 points of transition can also be seen on the axial series 2 image 80. The cecum is collapsed and now located in the left midabdomen, where it was seen in the right lower quadrant previously. Sutures are seen in the small bowel. Please correlate clinically.  PERITONEUM: There is moderate to large ascites which has increased.  VESSELS: IVC filter is present.  RETROPERITONEUM/LYMPH NODES: No lymphadenopathy.    ABDOMINAL WALL: Subcutaneous edema which was present previously.  BONES: Degenerative changes of bone.    IMPRESSION:     Interval development of marked small bowel obstruction with 2 points of transition concerning for closed loop obstruction. Swirling of the mesenteric vessels and interval displacement of the collapsed cecum into the left side of the abdomen. This critical value was discussed with Dr. Horta in the emergency room at 8:00 PM on 1/24/2020.    NG tube. Small bilateral pleural effusions are stable. Diffuse subcutaneous emphysema.          PRAMOD CHAVIS M.D., ATTENDING RADIOLOGIST  This document has been electronically signed. Jan 24 2020  8:05PM        --------------------------------------------------------------------------------------------------------------------------------------------    < from: Xray Chest 1 View AP/PA (01.24.20 @ 17:26) >  EXAM:  XR CHEST AP OR PA 1V                            PROCEDURE DATE:  01/24/2020          INTERPRETATION:  CLINICAL INDICATION: 64 years  Female with vomiting.    COMPARISON: None    AP view of the chest demonstrates the lungs to be clear. There is no pleural effusion. There is no pneumothorax.    The heart is normal in size. There is no mediastinal or hilar mass.     The pulmonary vasculature is normal.     Mild thoracic degenerative changes are present.    Markedly dilated large and small bowel with air-fluid levels are present in the abdomen suggestive of bowel obstruction. Please refer to the accompanying Abdomen xray report.    IMPRESSION:    No acute infiltrate.    Suspect large or small bowel obstruction or a please refer to the accompanying Abdomen xray report.    Findings discussed with Dr. Sandra Allan at 1/24/2020 6:09 PM with readback.                SHAHBAZ PEÑALOZA M.D., ATTENDING RADIOLOGIST  This document has been electronically signed. Jan 24 2020  6:09PM          < end of copied text >      ROS  [  ] UNABLE TO ELICIT

## 2020-01-27 NOTE — PROGRESS NOTE ADULT - SUBJECTIVE AND OBJECTIVE BOX
Patient was seen and examined  Patient is a 64y old  Female who presents with a chief complaint of sbo (27 Jan 2020 09:31)      INTERVAL HPI/OVERNIGHT EVENTS:  T(C): 36.3 (01-27-20 @ 05:46), Max: 36.4 (01-26-20 @ 20:00)  HR: 68 (01-27-20 @ 05:46) (61 - 75)  BP: 105/54 (01-27-20 @ 05:46) (79/48 - 105/54)  RR: 16 (01-27-20 @ 05:46) (15 - 18)  SpO2: 97% (01-27-20 @ 05:46) (97% - 100%)  Wt(kg): --  I&O's Summary    26 Jan 2020 07:01  -  27 Jan 2020 07:00  --------------------------------------------------------  IN: 0 mL / OUT: 900 mL / NET: -900 mL    27 Jan 2020 07:01  -  27 Jan 2020 11:47  --------------------------------------------------------  IN: 0 mL / OUT: 225 mL / NET: -225 mL        LABS:                        9.5    4.27  )-----------( 241      ( 27 Jan 2020 08:24 )             28.7     01-27    142  |  112<H>  |  10  ----------------------------<  52<L>  3.3<L>   |  18<L>  |  0.58    Ca    6.8<L>      27 Jan 2020 08:24  Phos  2.1     01-27  Mg     2.2     01-27    TPro  3.7<L>  /  Alb  1.9<L>  /  TBili  1.1  /  DBili  x   /  AST  27  /  ALT  38  /  AlkPhos  128<H>  01-27    PT/INR - ( 27 Jan 2020 08:24 )   PT: 14.5 sec;   INR: 1.30 ratio         PTT - ( 27 Jan 2020 08:24 )  PTT:37.4 sec    CAPILLARY BLOOD GLUCOSE                  MEDICATIONS  (STANDING):  dextrose 5% + sodium chloride 0.45%. 1000 milliLiter(s) (75 mL/Hr) IV Continuous <Continuous>  dextrose 5%. 1000 milliLiter(s) (50 mL/Hr) IV Continuous <Continuous>  dextrose 50% Injectable 12.5 Gram(s) IV Push once  dextrose 50% Injectable 25 Gram(s) IV Push once  dextrose 50% Injectable 25 Gram(s) IV Push once  furosemide   Injectable 40 milliGRAM(s) IV Push daily  potassium chloride  10 mEq/100 mL IVPB 10 milliEquivalent(s) IV Intermittent every 1 hour    MEDICATIONS  (PRN):  dextrose 40% Gel 15 Gram(s) Oral once PRN Blood Glucose LESS THAN 70 milliGRAM(s)/deciLiter  glucagon  Injectable 1 milliGRAM(s) IntraMuscular once PRN Glucose <70 milliGRAM(s)/deciLiter  ondansetron Injectable 4 milliGRAM(s) IV Push every 6 hours PRN Nausea      RADIOLOGY & ADDITIONAL TESTS:    Imaging Personally Reviewed:  [ ] YES  [ ] NO    REVIEW OF SYSTEMS:  CONSTITUTIONAL: No fever, weight loss, or fatigue  EYES: No eye pain, visual disturbances, or discharge  ENMT:  No difficulty hearing, tinnitus, vertigo; No sinus or throat pain  NECK: No pain or stiffness  BREASTS: No pain, masses, or nipple discharge  RESPIRATORY: No cough, wheezing, chills or hemoptysis; No shortness of breath  CARDIOVASCULAR: No chest pain, palpitations, dizziness, or leg swelling  GASTROINTESTINAL: No abdominal or epigastric pain. No nausea, vomiting, or hematemesis; No diarrhea or constipation. No melena or hematochezia.  GENITOURINARY: No dysuria, frequency, hematuria, or incontinence  NEUROLOGICAL: No headaches, memory loss, loss of strength, numbness, or tremors  SKIN: No itching, burning, rashes, or lesions   LYMPH NODES: No enlarged glands  ENDOCRINE: No heat or cold intolerance; No hair loss  MUSCULOSKELETAL: No joint pain or swelling; No muscle, back, or extremity pain  PSYCHIATRIC: No depression, anxiety, mood swings, or difficulty sleeping  HEME/LYMPH: No easy bruising, or bleeding gums  ALLERY AND IMMUNOLOGIC: No hives or eczema      Consultant(s) Notes Reviewed:  [ x ] YES  [ ] NO    PHYSICAL EXAM:  GENERAL: NAD, well-groomed, well-developed  HEAD:  Atraumatic, Normocephalic  EYES: EOMI, PERRLA, conjunctiva and sclera clear  ENMT: No tonsillar erythema, exudates, or enlargement; Moist mucous membranes, Good dentition, No lesions  NECK: Supple, No JVD, Normal thyroid  NERVOUS SYSTEM:  Alert & Oriented X3, Good concentration; Motor Strength 5/5 B/L upper and lower extremities; DTRs 2+ intact and symmetric  CHEST/LUNG: Clear to percussion bilaterally; No rales, rhonchi, wheezing, or rubs  HEART: Regular rate and rhythm; No murmurs, rubs, or gallops  ABDOMEN: Soft, Nontender, Nondistended; Bowel sounds present  EXTREMITIES:  2+ Peripheral Pulses, No clubbing, cyanosis, or edema  LYMPH: No lymphadenopathy noted  SKIN: No rashes or lesions    Care Discussed with Consultants/Other Providers [ x] YES  [ ] NO

## 2020-01-27 NOTE — DIETITIAN INITIAL EVALUATION ADULT. - NS FNS REASON FOR WEIGHT CHANG
fluid retention/altered GI function (specify)/other (specify)/Chronic illness, h/o intestinal surgery/decreased po intake

## 2020-01-27 NOTE — CONSULT NOTE ADULT - SUBJECTIVE AND OBJECTIVE BOX
Patient is a 64y old  Female who presents with a chief complaint of sbo (27 Jan 2020 18:22)      HPI:  65yo female with hx of recurrent SBO's, s/p exp lap, SB resection in 2015, ex lap, ALBINA in 2018, DVT, PE, on xarelto, IVC filter, chronic leg swelling, and anasarca p/w generalized weakness, nausea, vomiting x 1 day. Reports last BM last night.  Denies any abd pain, fever, cp, sob, diarrhea or sick contacts. (24 Jan 2020 20:01)she was admitted for increased generaized swelling and weakness.  No bleeding, fever, chills, cough, N/V/D.       ROS:  Negative except for:    PAST MEDICAL & SURGICAL HISTORY:  Pulmonary embolism  DVT (deep venous thrombosis)  SBO (small bowel obstruction)  History of intestinal surgery      SOCIAL HISTORY:    FAMILY HISTORY:  No pertinent family history in first degree relatives      MEDICATIONS  (STANDING):  dextrose 5% + sodium chloride 0.45%. 1000 milliLiter(s) (75 mL/Hr) IV Continuous <Continuous>  dextrose 5%. 1000 milliLiter(s) (50 mL/Hr) IV Continuous <Continuous>  dextrose 50% Injectable 12.5 Gram(s) IV Push once  dextrose 50% Injectable 25 Gram(s) IV Push once  dextrose 50% Injectable 25 Gram(s) IV Push once  furosemide   Injectable 40 milliGRAM(s) IV Push daily    MEDICATIONS  (PRN):  dextrose 40% Gel 15 Gram(s) Oral once PRN Blood Glucose LESS THAN 70 milliGRAM(s)/deciLiter  glucagon  Injectable 1 milliGRAM(s) IntraMuscular once PRN Glucose <70 milliGRAM(s)/deciLiter  ondansetron Injectable 4 milliGRAM(s) IV Push every 6 hours PRN Nausea      Allergies    No Known Allergies    Intolerances        Vital Signs Last 24 Hrs  T(C): 36.4 (27 Jan 2020 14:36), Max: 36.4 (26 Jan 2020 20:00)  T(F): 97.6 (27 Jan 2020 14:36), Max: 97.6 (27 Jan 2020 14:36)  HR: 70 (27 Jan 2020 14:36) (62 - 70)  BP: 95/63 (27 Jan 2020 14:36) (94/59 - 105/54)  BP(mean): --  RR: 16 (27 Jan 2020 14:36) (15 - 17)  SpO2: 100% (27 Jan 2020 14:36) (97% - 100%)    PHYSICAL EXAM  General: adult in NAD  HEENT: clear oropharynx, anicteric sclera, pink conjunctiva  Neck: supple  CV: normal S1/S2 with no murmur rubs or gallops  Lungs: positive air movement b/l ant lungs,clear to auscultation, no wheezes, no rales  Abdomen: soft non-tender non-distended, no hepatosplenomegaly  Ext: no clubbing cyanosis or edema  Skin: no rashes and no petechiae  Neuro: alert and oriented X 4, no focal deficits      LABS:                          9.5    4.27  )-----------( 241      ( 27 Jan 2020 08:24 )             28.7         Mean Cell Volume : 87.0 fl  Mean Cell Hemoglobin : 28.8 pg  Mean Cell Hemoglobin Concentration : 33.1 gm/dL  Auto Neutrophil # : x  Auto Lymphocyte # : x  Auto Monocyte # : x  Auto Eosinophil # : x  Auto Basophil # : x  Auto Neutrophil % : x  Auto Lymphocyte % : x  Auto Monocyte % : x  Auto Eosinophil % : x  Auto Basophil % : x      Serial CBC's  01-27 @ 08:24  Hct-28.7 / Hgb-9.5 / Plat-241 / RBC-3.30 / WBC-4.27  Serial CBC's  01-27 @ 00:18  Hct-27.4 / Hgb-9.1 / Plat-238 / RBC-3.09 / WBC-4.86  Serial CBC's  01-26 @ 11:09  Hct-23.9 / Hgb-7.7 / Plat-284 / RBC-2.69 / WBC-4.67  Serial CBC's  01-26 @ 07:10  Hct-23.5 / Hgb-7.6 / Plat-256 / RBC-2.63 / WBC-4.88  Serial CBC's  01-25 @ 07:04  Hct-31.9 / Hgb-10.6 / Plat-349 / RBC-3.65 / WBC-5.99  Serial CBC's  01-24 @ 14:20  Hct-30.7 / Hgb-10.2 / Plat-343 / RBC-3.48 / WBC-7.76      01-27    142  |  112<H>  |  10  ----------------------------<  52<L>  3.3<L>   |  18<L>  |  0.58    Ca    6.8<L>      27 Jan 2020 08:24  Phos  2.1     01-27  Mg     2.2     01-27    TPro  3.7<L>  /  Alb  1.9<L>  /  TBili  1.1  /  DBili  x   /  AST  27  /  ALT  38  /  AlkPhos  128<H>  01-27      PT/INR - ( 27 Jan 2020 08:24 )   PT: 14.5 sec;   INR: 1.30 ratio         PTT - ( 27 Jan 2020 08:24 )  PTT:37.4 sec    Reticulocyte Percent: 2.8 % (01-27 @ 08:24)              BLOOD SMEAR INTERPRETATION:       RADIOLOGY & ADDITIONAL STUDIES:  < from: CT Angio Abdomen and Pelvis w/ IV Cont (01.26.20 @ 12:57) >  PROCEDURE:   CT of the Abdomen and Pelvis was performed with and without intravenous contrast.  Precontrast, Arterial and Delayed phases were performed.  Intravenous contrast: 90 ml Omnipaque 350.10 ml discarded.  Oral contrast: None.  Sagittal and coronal reformats were performed.    Note: IV contrast extravasated into the patient's left upper extremity. On the delayed imaging IV contrast is seen within the upper arm measuring at least 3.5 x4.4 x 16.6 cm. No intravenous contrast is seen within the abdominal or pelvic vasculature markedly limiting evaluation.    FINDINGS:    LOWER CHEST: Small bilateral effusions with associated atelectasis. More prominent since the previous exam    LIVER: Within normal limits.  BILE DUCTS: Normal caliber.  GALLBLADDER: Cholelithiasis.  SPLEEN: Within normal limits.  PANCREAS: Within normal limits.  ADRENALS: Within normal limits.  KIDNEYS/URETERS: Within normal limits.    BLADDER: Cain catheter.  REPRODUCTIVE ORGANS: Uterus and adnexa within normal limits.    BOWEL: Nasogastric tube with tip in stomach. There is a small bowel anastomosis. There are dilated loops of small bowel however these are less prominent since the previous exam. Oral contrast is seen within distal small bowel and colon.  PERITONEUM: Large volume ascites  VESSELS: IVC filter.  RETROPERITONEUM/LYMPH NODES: No lymphadenopathy.    ABDOMINAL WALL: Anasarca  BONES: Within normal limits.    IMPRESSION:     Note: IV contrast extravasated into the patient's left upper extremity. On the delayed imaging IV contrast is seen within the upper arm measuring at least 3.5 x 4.4 x 16.6 cm. No intravenous contrast is seen within the abdominal or pelvic vasculature markedly limiting evaluation.    Again seen are dilated loops of small bowel, however these are less prominent since the previous exam, and oral contrast has passed distally into the colon.    < end of copied text >  < from: CT Angio Abdomen and Pelvis w/ IV Cont (01.26.20 @ 12:57) >    Small bilateral effusions with associated atelectasis. More prominent since the previous exam large volume ascites and anasarca.    No definite evidence for hematoma.    < end of copied text >

## 2020-01-27 NOTE — CONSULT NOTE ADULT - PROBLEM SELECTOR RECOMMENDATION 9
she has been anemia, although all w/u for anemia id neg but probably, it is due to malnutrition causing BM damage.  Her baseline is 8.  Hb was 10 at admission then dropped to 7 without bleeding  I think the reading of 10 at admission was a error. but I will r/o hemolysis

## 2020-01-27 NOTE — DIETITIAN INITIAL EVALUATION ADULT. - FACTORS AFF FOOD INTAKE
persistent lack of appetite/weakness, intestinal obstruction, SBO, ascites,  DVT, h/o intestinal surgery/pain/vomiting/other (specify)/difficulty with food procurement/preparation/persistent constipation

## 2020-01-28 LAB
ANION GAP SERPL CALC-SCNC: 7 MMOL/L — SIGNIFICANT CHANGE UP (ref 5–17)
BUN SERPL-MCNC: 6 MG/DL — LOW (ref 7–18)
CALCIUM SERPL-MCNC: 7.3 MG/DL — LOW (ref 8.4–10.5)
CHLORIDE SERPL-SCNC: 114 MMOL/L — HIGH (ref 96–108)
CO2 SERPL-SCNC: 23 MMOL/L — SIGNIFICANT CHANGE UP (ref 22–31)
CREAT SERPL-MCNC: 0.51 MG/DL — SIGNIFICANT CHANGE UP (ref 0.5–1.3)
GLUCOSE SERPL-MCNC: 98 MG/DL — SIGNIFICANT CHANGE UP (ref 70–99)
HCT VFR BLD CALC: 29.9 % — LOW (ref 34.5–45)
HGB BLD-MCNC: 10.1 G/DL — LOW (ref 11.5–15.5)
MAGNESIUM SERPL-MCNC: 1.9 MG/DL — SIGNIFICANT CHANGE UP (ref 1.6–2.6)
MCHC RBC-ENTMCNC: 28.9 PG — SIGNIFICANT CHANGE UP (ref 27–34)
MCHC RBC-ENTMCNC: 33.8 GM/DL — SIGNIFICANT CHANGE UP (ref 32–36)
MCV RBC AUTO: 85.4 FL — SIGNIFICANT CHANGE UP (ref 80–100)
NRBC # BLD: 0 /100 WBCS — SIGNIFICANT CHANGE UP (ref 0–0)
PHOSPHATE SERPL-MCNC: 1.7 MG/DL — LOW (ref 2.5–4.5)
PLATELET # BLD AUTO: 228 K/UL — SIGNIFICANT CHANGE UP (ref 150–400)
POTASSIUM SERPL-MCNC: 3.3 MMOL/L — LOW (ref 3.5–5.3)
POTASSIUM SERPL-SCNC: 3.3 MMOL/L — LOW (ref 3.5–5.3)
RBC # BLD: 3.5 M/UL — LOW (ref 3.8–5.2)
RBC # FLD: 21.1 % — HIGH (ref 10.3–14.5)
SODIUM SERPL-SCNC: 144 MMOL/L — SIGNIFICANT CHANGE UP (ref 135–145)
WBC # BLD: 3.93 K/UL — SIGNIFICANT CHANGE UP (ref 3.8–10.5)
WBC # FLD AUTO: 3.93 K/UL — SIGNIFICANT CHANGE UP (ref 3.8–10.5)

## 2020-01-28 PROCEDURE — 74177 CT ABD & PELVIS W/CONTRAST: CPT | Mod: 26

## 2020-01-28 RX ORDER — RIVAROXABAN 15 MG-20MG
15 KIT ORAL
Refills: 0 | Status: DISCONTINUED | OUTPATIENT
Start: 2020-01-28 | End: 2020-01-30

## 2020-01-28 RX ORDER — RIVAROXABAN 15 MG-20MG
15 KIT ORAL DAILY
Refills: 0 | Status: DISCONTINUED | OUTPATIENT
Start: 2020-01-28 | End: 2020-01-28

## 2020-01-28 RX ORDER — POTASSIUM CHLORIDE 20 MEQ
40 PACKET (EA) ORAL ONCE
Refills: 0 | Status: COMPLETED | OUTPATIENT
Start: 2020-01-28 | End: 2020-01-28

## 2020-01-28 RX ORDER — SODIUM,POTASSIUM PHOSPHATES 278-250MG
1 POWDER IN PACKET (EA) ORAL
Refills: 0 | Status: COMPLETED | OUTPATIENT
Start: 2020-01-28 | End: 2020-01-29

## 2020-01-28 RX ADMIN — Medication 1 TABLET(S): at 16:35

## 2020-01-28 RX ADMIN — Medication 1 TABLET(S): at 11:17

## 2020-01-28 RX ADMIN — Medication 1 TABLET(S): at 22:25

## 2020-01-28 RX ADMIN — RIVAROXABAN 15 MILLIGRAM(S): KIT at 22:25

## 2020-01-28 RX ADMIN — RIVAROXABAN 15 MILLIGRAM(S): KIT at 11:16

## 2020-01-28 RX ADMIN — Medication 40 MILLIEQUIVALENT(S): at 16:36

## 2020-01-28 NOTE — PROGRESS NOTE ADULT - SUBJECTIVE AND OBJECTIVE BOX
Patient was seen and examined  Patient is a 64y old  Female who presents with a chief complaint of sbo (27 Jan 2020 20:30)      INTERVAL HPI/OVERNIGHT EVENTS:  T(C): 36.3 (01-28-20 @ 05:58), Max: 36.8 (01-27-20 @ 22:07)  HR: 69 (01-28-20 @ 05:58) (69 - 70)  BP: 97/55 (01-28-20 @ 05:58) (95/63 - 98/65)  RR: 16 (01-28-20 @ 05:58) (16 - 17)  SpO2: 97% (01-28-20 @ 05:58) (97% - 100%)  Wt(kg): --  I&O's Summary    27 Jan 2020 07:01  -  28 Jan 2020 07:00  --------------------------------------------------------  IN: 0 mL / OUT: 225 mL / NET: -225 mL        LABS:                        10.1   3.93  )-----------( 228      ( 28 Jan 2020 08:30 )             29.9     01-27    142  |  112<H>  |  10  ----------------------------<  52<L>  3.3<L>   |  18<L>  |  0.58    Ca    6.8<L>      27 Jan 2020 08:24  Phos  2.1     01-27  Mg     2.2     01-27    TPro  3.7<L>  /  Alb  1.9<L>  /  TBili  1.1  /  DBili  x   /  AST  27  /  ALT  38  /  AlkPhos  128<H>  01-27    PT/INR - ( 27 Jan 2020 08:24 )   PT: 14.5 sec;   INR: 1.30 ratio         PTT - ( 27 Jan 2020 08:24 )  PTT:37.4 sec    CAPILLARY BLOOD GLUCOSE                  MEDICATIONS  (STANDING):  dextrose 5% + sodium chloride 0.45%. 1000 milliLiter(s) (75 mL/Hr) IV Continuous <Continuous>  dextrose 5%. 1000 milliLiter(s) (50 mL/Hr) IV Continuous <Continuous>  dextrose 50% Injectable 12.5 Gram(s) IV Push once  dextrose 50% Injectable 25 Gram(s) IV Push once  dextrose 50% Injectable 25 Gram(s) IV Push once  furosemide   Injectable 40 milliGRAM(s) IV Push daily    MEDICATIONS  (PRN):  dextrose 40% Gel 15 Gram(s) Oral once PRN Blood Glucose LESS THAN 70 milliGRAM(s)/deciLiter  glucagon  Injectable 1 milliGRAM(s) IntraMuscular once PRN Glucose <70 milliGRAM(s)/deciLiter  ondansetron Injectable 4 milliGRAM(s) IV Push every 6 hours PRN Nausea      RADIOLOGY & ADDITIONAL TESTS:    Imaging Personally Reviewed:  [ ] YES  [ ] NO    REVIEW OF SYSTEMS:  CONSTITUTIONAL: No fever, weight loss, or fatigue  EYES: No eye pain, visual disturbances, or discharge  ENMT:  No difficulty hearing, tinnitus, vertigo; No sinus or throat pain  NECK: No pain or stiffness  BREASTS: No pain, masses, or nipple discharge  RESPIRATORY: No cough, wheezing, chills or hemoptysis; No shortness of breath  CARDIOVASCULAR: No chest pain, palpitations, dizziness, or leg swelling  GASTROINTESTINAL: No abdominal or epigastric pain. No nausea, vomiting, or hematemesis; No diarrhea or constipation. No melena or hematochezia.  GENITOURINARY: No dysuria, frequency, hematuria, or incontinence  NEUROLOGICAL: No headaches, memory loss, loss of strength, numbness, or tremors  SKIN: No itching, burning, rashes, or lesions   LYMPH NODES: No enlarged glands  ENDOCRINE: No heat or cold intolerance; No hair loss  MUSCULOSKELETAL: No joint pain or swelling; No muscle, back, or extremity pain  PSYCHIATRIC: No depression, anxiety, mood swings, or difficulty sleeping  HEME/LYMPH: No easy bruising, or bleeding gums  ALLERY AND IMMUNOLOGIC: No hives or eczema      Consultant(s) Notes Reviewed:  [ x ] YES  [ ] NO    PHYSICAL EXAM:  GENERAL: NAD, well-groomed, well-developed  HEAD:  Atraumatic, Normocephalic  EYES: EOMI, PERRLA, conjunctiva and sclera clear  ENMT: No tonsillar erythema, exudates, or enlargement; Moist mucous membranes, Good dentition, No lesions  NECK: Supple, No JVD, Normal thyroid  NERVOUS SYSTEM:  Alert & Oriented X3, Good concentration; Motor Strength 5/5 B/L upper and lower extremities; DTRs 2+ intact and symmetric  CHEST/LUNG: Clear to percussion bilaterally; No rales, rhonchi, wheezing, or rubs  HEART: Regular rate and rhythm; No murmurs, rubs, or gallops  ABDOMEN: Soft, Nontender, Nondistended; Bowel sounds present  EXTREMITIES:  2+ Peripheral Pulses, No clubbing, cyanosis, or edema  LYMPH: No lymphadenopathy noted  SKIN: No rashes or lesions    Care Discussed with Consultants/Other Providers [ x] YES  [ ] NO

## 2020-01-28 NOTE — PROGRESS NOTE ADULT - ASSESSMENT
Assessment and Recommendation:   · Assessment		  64 year old lady with multiple surgery for repeated SBO that caused malabsorption with low albumin, edema and ascites.  She has been anemic with baseline Hb about 8 wasa dimmted for werakness.  Hb dropped from 10 to 7 without bleeding noticed.    Problem/Recommendation - 1:  Problem: Anemia. Recommendation: she has been anemia, although all w/u for anemia id neg but probably, it is due to malnutrition causing BM damage.  Her baseline is 8.  Hb was 10 at admission then dropped to 7 without bleeding  I think the reading of 10 at admission was a error. but I will r/o hemolysis.  so far there is no evidence of hemolysis  do FOBT    Problem/Recommendation - 2:  ·  Problem: Hypoalbuminemia.  Recommendation: short bowel syndrome causing malabsorption  the low albumin caused ascites and edema  can give albumin and lasix for diuresis.     Problem/Recommendation - 3:  ·  Problem: DVT (deep venous thrombosis).  Recommendation: she was off xarelto since 1/24  now there is new L DVT  back on xarelto     Problem/Recommendation - 4:  ·  Problem: SBO (small bowel obstruction).  Recommendation: she has similar finding in x ray for long time  clinically she has bm and not N/V  no clinical obs  probably she always has partial SBO  no surgery needed.

## 2020-01-28 NOTE — PROGRESS NOTE ADULT - SUBJECTIVE AND OBJECTIVE BOX
[   ] ICU                                          [   ] CCU                                      [ x  ] Medical Floor    Patient is comfortable. No new complaints reported, No abdominal pain, N/V, hematemesis, hematochezia, melena, fever, chills, chest pain, SOB, cough or diarrhea reported.    VITALS  Vital Signs Last 24 Hrs  T(C): 36.6 (28 Jan 2020 14:49), Max: 36.8 (27 Jan 2020 22:07)  T(F): 97.8 (28 Jan 2020 14:49), Max: 98.3 (27 Jan 2020 22:07)  HR: 79 (28 Jan 2020 14:49) (69 - 79)  BP: 94/64 (28 Jan 2020 14:49) (94/64 - 98/65)   RR: 16 (28 Jan 2020 14:49) (16 - 17)  SpO2: 100% (28 Jan 2020 14:49) (97% - 100%)       MEDICATIONS  (STANDING):  dextrose 5%. 1000 milliLiter(s) (50 mL/Hr) IV Continuous <Continuous>  dextrose 50% Injectable 12.5 Gram(s) IV Push once  dextrose 50% Injectable 25 Gram(s) IV Push once  dextrose 50% Injectable 25 Gram(s) IV Push once  furosemide   Injectable 40 milliGRAM(s) IV Push daily  potassium acid phosphate/sodium acid phosphate tablet (K-PHOS No. 2) 1 Tablet(s) Oral four times a day with meals  rivaroxaban 15 milliGRAM(s) Oral daily    MEDICATIONS  (PRN):  dextrose 40% Gel 15 Gram(s) Oral once PRN Blood Glucose LESS THAN 70 milliGRAM(s)/deciLiter  glucagon  Injectable 1 milliGRAM(s) IntraMuscular once PRN Glucose <70 milliGRAM(s)/deciLiter  ondansetron Injectable 4 milliGRAM(s) IV Push every 6 hours PRN Nausea                            10.1   3.93  )-----------( 228      ( 28 Jan 2020 08:30 )             29.9       EXAM:  CT ABDOMEN AND PELVIS IC                            PROCEDURE DATE:  01/28/2020          INTERPRETATION:  CLINICAL INDICATION: 64 years  Female with sbo. Evaluate IVC filter    COMPARISON: 1/26/2020 and 01/24/2020    PROCEDURE:   CT of the Abdomen and Pelvis was performed with intravenous contrast.   Intravenous contrast: 90 ml Omnipaque 350. 10 ml discarded.  Oral contrast: None.  Sagittal and coronal reformats were performed.    LIMITATION: Absence of enteric contrast limits evaluationof the GI tract.    FINDINGS:    LOWER CHEST: Moderate bilateral pleural effusions with underlying compressive atelectasis of lower lobes, unchanged from 1/26/2020. Mild cardiomegaly.    LIVER: Within normal limits.  BILE DUCTS: Normal caliber.  GALLBLADDER: Distended with stones and vicarious excretion of contrast.  SPLEEN: Within normal limits.  PANCREAS: Within normal limits.  ADRENALS: Within normal limits.  KIDNEYS/URETERS: 5 mm right renal cyst versus angiomyolipoma. No hydroureteronephrosis. Symmetrical nephrograms.    BLADDER: Within normal limits.  REPRODUCTIVE ORGANS: Unremarkable uterus.    BOWEL: Previously seen small bowel obstruction has significantly improved. There is a mildly dilated loop of proximal jejunum in the upper mid abdomen measuring up to 3.6 cm in caliber (3:62 and 5:31). The remainder of the small bowel is normal in caliber however some left-sided jejunal loops are thickened. There is no evidence of small bowel pneumatosis. There is no evidence of colonic obstruction. Semisolid fecal matter is present in the left colon with air droplets in the dependent portion. The finding is likely related to the presence of semisolid fecal matter however recommend clinical correlation for pneumatosis. Appendix is not visualized and cannot be assessed. There is a radiodense tablets in the gastric antrum. The gastric wall is thickened. NG tube has been removed. Small bowel anastomosis visualized in the anterior mid abdomen with focal dilatation of bowel loops to 4.8 cm.  PERITONEUM: Moderate to marked ascites, unchanged.  VESSELS: IVC filter in situ. Visualized portions of the intra and extrahepatic portal vein and hepatic veins are patent. Below the IVC filter, the IVC is patent. The infrahepatic IVC is patent. Between the tip of the IVC and the level of the liver, the IVC is difficult to evaluate for intraluminal thrombus. Visualized portions of the SMA, celiac axis and KINA are patent.  RETROPERITONEUM/LYMPH NODES: No lymphadenopathy.    ABDOMINAL WALL: Marked anasarca, unchanged.  BONES: Mild degenerative changes.    IMPRESSION:     Absence of enteric contrast limits evaluation of the GI tract.    Previous small bowel obstruction has improved however segment of dilated small bowel are still present. Thickened small bowel loops in the left abdomen consistent with enteritis.    Gastric wall consistent with gastritis. Consider endoscopy for confirmation and to exclude underlying mass.    Semisolid fecal matter in the colon resulting in the appearance of pneumatosis, likely artifactual. Correlate clinically.    No definite venous thrombus identified. Evaluation is limited between the tip of the IVC filter and the liver.    Cholelithiasis.    Marked ascites and marked anasarca. Moderate bilateral pleural effusions with underlying compressive atelectasis unchanged.          01-28    144  |  114<H>  |  6<L>  ----------------------------<  98  3.3<L>   |  23  |  0.51    Ca    7.3<L>      28 Jan 2020 08:30  Phos  1.7     01-28  Mg     1.9     01-28    TPro  3.7<L>  /  Alb  1.9<L>  /  TBili  1.1  /  DBili  x   /  AST  27  /  ALT  38  /  AlkPhos  128<H>  01-27      PT/INR - ( 27 Jan 2020 08:24 )   PT: 14.5 sec;   INR: 1.30 ratio         PTT - ( 27 Jan 2020 08:24 )  PTT:37.4 sec

## 2020-01-28 NOTE — PROGRESS NOTE ADULT - NEGATIVE GASTROINTESTINAL SYMPTOMS
no jaundice/no hiccoughs/no nausea/no vomiting/no abdominal pain/no diarrhea/no hematochezia/no melena

## 2020-01-28 NOTE — PROGRESS NOTE ADULT - ASSESSMENT
1. Anemia  2. No evidence of acute GI bleeding  3. Portal vein thrombosis  4. Ascites  5. SBO improving    Suggestions:    1. Monitor H/H  2. Transfuse PRBC as needed  3. Protonix daily  4. Avoid NSAID  5. Monitor electrolytes  6. Consider Albumin transfusion  7. Consider therapeutic paracentesis  8. Low salt diet  9. Diuretics / B-blocker therapy  10. Avoid NSAID  11. DVT prophylaxis

## 2020-01-28 NOTE — PROGRESS NOTE ADULT - SUBJECTIVE AND OBJECTIVE BOX
feel better  no sob, pain  still has swelling of legs  no N/V had BM    MEDICATIONS  (STANDING):  dextrose 5%. 1000 milliLiter(s) (50 mL/Hr) IV Continuous <Continuous>  dextrose 50% Injectable 12.5 Gram(s) IV Push once  dextrose 50% Injectable 25 Gram(s) IV Push once  dextrose 50% Injectable 25 Gram(s) IV Push once  furosemide   Injectable 40 milliGRAM(s) IV Push daily  potassium acid phosphate/sodium acid phosphate tablet (K-PHOS No. 2) 1 Tablet(s) Oral four times a day with meals  rivaroxaban 15 milliGRAM(s) Oral daily    MEDICATIONS  (PRN):  dextrose 40% Gel 15 Gram(s) Oral once PRN Blood Glucose LESS THAN 70 milliGRAM(s)/deciLiter  glucagon  Injectable 1 milliGRAM(s) IntraMuscular once PRN Glucose <70 milliGRAM(s)/deciLiter  ondansetron Injectable 4 milliGRAM(s) IV Push every 6 hours PRN Nausea      Allergies    No Known Allergies    Intolerances        Vital Signs Last 24 Hrs  T(C): 36.6 (28 Jan 2020 14:49), Max: 36.8 (27 Jan 2020 22:07)  T(F): 97.8 (28 Jan 2020 14:49), Max: 98.3 (27 Jan 2020 22:07)  HR: 79 (28 Jan 2020 14:49) (69 - 79)  BP: 94/64 (28 Jan 2020 14:49) (94/64 - 98/65)  BP(mean): --  RR: 16 (28 Jan 2020 14:49) (16 - 17)  SpO2: 100% (28 Jan 2020 14:49) (97% - 100%)    PHYSICAL EXAM  General: adult in NAD  HEENT: clear oropharynx, anicteric sclera, pink conjunctiva  Neck: supple  CV: normal S1/S2 with no murmur rubs or gallops  Lungs: positive air movement b/l ant lungs,clear to auscultation, no wheezes, no rales  Abdomen: soft non-tender non-distended, no hepatosplenomegaly  Ext: no clubbing cyanosis or edema  Skin: no rashes and no petechiae  Neuro: alert and oriented X 4, no focal deficits  LABS:                          10.1   3.93  )-----------( 228      ( 28 Jan 2020 08:30 )             29.9         Mean Cell Volume : 85.4 fl  Mean Cell Hemoglobin : 28.9 pg  Mean Cell Hemoglobin Concentration : 33.8 gm/dL  Auto Neutrophil # : x  Auto Lymphocyte # : x  Auto Monocyte # : x  Auto Eosinophil # : x  Auto Basophil # : x  Auto Neutrophil % : x  Auto Lymphocyte % : x  Auto Monocyte % : x  Auto Eosinophil % : x  Auto Basophil % : x    Serial CBC  Hematocrit 29.9  Hemoglobin 10.1  Plat 228  RBC 3.50  WBC 3.93  Serial CBC  Hematocrit 28.7  Hemoglobin 9.5  Plat 241  RBC 3.30  WBC 4.27  Serial CBC  Hematocrit 27.4  Hemoglobin 9.1  Plat 238  RBC 3.09  WBC 4.86  Serial CBC  Hematocrit 23.9  Hemoglobin 7.7  Plat 284  RBC 2.69  WBC 4.67  Serial CBC  Hematocrit 23.5  Hemoglobin 7.6  Plat 256  RBC 2.63  WBC 4.88  Serial CBC  Hematocrit 31.9  Hemoglobin 10.6  Plat 349  RBC 3.65  WBC 5.99    01-28    144  |  114<H>  |  6<L>  ----------------------------<  98  3.3<L>   |  23  |  0.51    Ca    7.3<L>      28 Jan 2020 08:30  Phos  1.7     01-28  Mg     1.9     01-28    TPro  3.7<L>  /  Alb  1.9<L>  /  TBili  1.1  /  DBili  x   /  AST  27  /  ALT  38  /  AlkPhos  128<H>  01-27      PT/INR - ( 27 Jan 2020 08:24 )   PT: 14.5 sec;   INR: 1.30 ratio         PTT - ( 27 Jan 2020 08:24 )  PTT:37.4 sec    Reticulocyte Percent: 2.8 % (01-27 @ 08:24)            BLOOD SMEAR INTERPRETATION:       RADIOLOGY & ADDITIONAL STUDIES:

## 2020-01-28 NOTE — PROGRESS NOTE ADULT - ASSESSMENT
63yo female with SBO, concerns for closed loop on CT, hx PE/dvt on xarelto. Patient admitted to surgery for management of SBO. Medicine consulted due to ascites. CT abdomen does not show liver cirrhosis, but will initiate albumin q6 and lasix to help initiate diuresis. Hold off on spironolactone as bp will likely not allow at present. Will pursue CTA abdomen/pelvis to r/o portal vein thrombosis.  REPLACE K PHOS  repeat sma7 65yo female with SBO, concerns for closed loop on CT, hx PE/dvt on xarelto. Patient admitted to surgery for management of SBO. Medicine consulted due to ascites. CT abdomen does not show liver cirrhosis, but will initiate albumin q6 and lasix to help initiate diuresis. Hold off on spironolactone as bp will likely not allow at present. Will pursue CTA abdomen/pelvis to r/o portal vein thrombosis.  REPLACE K PHOS  repeat sma7  DIETARY EVAL ADVANCE DIET ONCE CLEARED BY SURGERY AND GI PT HAD BM

## 2020-01-28 NOTE — PROGRESS NOTE ADULT - NEGATIVE ENMT SYMPTOMS
no hearing difficulty/no nose bleeds/no gum bleeding/no dry mouth/no throat pain/no dysphagia/no ear pain

## 2020-01-29 LAB — FERRITIN SERPL-MCNC: 164 NG/ML — HIGH (ref 15–150)

## 2020-01-29 PROCEDURE — 99232 SBSQ HOSP IP/OBS MODERATE 35: CPT

## 2020-01-29 RX ORDER — SODIUM,POTASSIUM PHOSPHATES 278-250MG
1 POWDER IN PACKET (EA) ORAL
Refills: 0 | Status: DISCONTINUED | OUTPATIENT
Start: 2020-01-29 | End: 2020-01-30

## 2020-01-29 RX ORDER — DEXTROSE MONOHYDRATE, SODIUM CHLORIDE, AND POTASSIUM CHLORIDE 50; .745; 4.5 G/1000ML; G/1000ML; G/1000ML
500 INJECTION, SOLUTION INTRAVENOUS
Refills: 0 | Status: DISCONTINUED | OUTPATIENT
Start: 2020-01-29 | End: 2020-01-30

## 2020-01-29 RX ORDER — FUROSEMIDE 40 MG
20 TABLET ORAL ONCE
Refills: 0 | Status: COMPLETED | OUTPATIENT
Start: 2020-01-29 | End: 2020-01-29

## 2020-01-29 RX ORDER — ALBUMIN HUMAN 25 %
50 VIAL (ML) INTRAVENOUS ONCE
Refills: 0 | Status: COMPLETED | OUTPATIENT
Start: 2020-01-29 | End: 2020-01-29

## 2020-01-29 RX ORDER — DEXTROSE MONOHYDRATE, SODIUM CHLORIDE, AND POTASSIUM CHLORIDE 50; .745; 4.5 G/1000ML; G/1000ML; G/1000ML
1000 INJECTION, SOLUTION INTRAVENOUS
Refills: 0 | Status: DISCONTINUED | OUTPATIENT
Start: 2020-01-29 | End: 2020-01-30

## 2020-01-29 RX ADMIN — Medication 1 TABLET(S): at 08:39

## 2020-01-29 RX ADMIN — DEXTROSE MONOHYDRATE, SODIUM CHLORIDE, AND POTASSIUM CHLORIDE 250 MILLILITER(S): 50; .745; 4.5 INJECTION, SOLUTION INTRAVENOUS at 21:29

## 2020-01-29 RX ADMIN — Medication 1 TABLET(S): at 17:06

## 2020-01-29 RX ADMIN — DEXTROSE MONOHYDRATE, SODIUM CHLORIDE, AND POTASSIUM CHLORIDE 250 MILLILITER(S): 50; .745; 4.5 INJECTION, SOLUTION INTRAVENOUS at 10:41

## 2020-01-29 RX ADMIN — Medication 50 MILLILITER(S): at 10:40

## 2020-01-29 RX ADMIN — RIVAROXABAN 15 MILLIGRAM(S): KIT at 08:39

## 2020-01-29 RX ADMIN — RIVAROXABAN 15 MILLIGRAM(S): KIT at 17:06

## 2020-01-29 RX ADMIN — Medication 1 TABLET(S): at 21:29

## 2020-01-29 RX ADMIN — Medication 1 TABLET(S): at 12:44

## 2020-01-29 RX ADMIN — Medication 20 MILLIGRAM(S): at 08:39

## 2020-01-29 NOTE — CHART NOTE - NSCHARTNOTEFT_GEN_A_CORE
Reassessment:   64yFemalePatient is a 64y old  Female who presents with a chief complaint of sbo (2020 17:46)      Factors impacting intake: [ ] none [ ] nausea  [ ] vomiting [ ] diarrhea [ ] constipation  [ ]chewing problems [ ] swallowing issues  [X ] other:     Diet Presciption: Diet, Regular:   Supplement Feeding Modality:  Oral  Ensure Enlive Cans or Servings Per Day:  1       Frequency:  Three Times a day (20 @ 15:06)    Intake: Patient visited at dinner meal, reports po intake improving, consuming >50% of meals & tolerating, also observed dinner tray, drinks at least 1 bottle of Ensure Enlive at mealtime, stated no GI distress, rec. c/w Regular diet with Ensure Enlive 1 can TID as medically feasible. RD available.     Daily Weight in k.9 (2020 11:18)    % Weight Change: stable     Pertinent Medications: MEDICATIONS  (STANDING):  dextrose 5% + sodium chloride 0.45% with potassium chloride 10 mEq/L 1000 milliLiter(s) (250 mL/Hr) IV Continuous <Continuous>  dextrose 5% + sodium chloride 0.45% with potassium chloride 10 mEq/L 500 milliLiter(s) (250 mL/Hr) IV Continuous <Continuous>  dextrose 5%. 1000 milliLiter(s) (50 mL/Hr) IV Continuous <Continuous>  dextrose 50% Injectable 12.5 Gram(s) IV Push once  dextrose 50% Injectable 25 Gram(s) IV Push once  dextrose 50% Injectable 25 Gram(s) IV Push once  furosemide   Injectable 40 milliGRAM(s) IV Push daily  potassium acid phosphate/sodium acid phosphate tablet (K-PHOS No. 2) 1 Tablet(s) Oral four times a day with meals  rivaroxaban 15 milliGRAM(s) Oral two times a day with meals    MEDICATIONS  (PRN):  dextrose 40% Gel 15 Gram(s) Oral once PRN Blood Glucose LESS THAN 70 milliGRAM(s)/deciLiter  glucagon  Injectable 1 milliGRAM(s) IntraMuscular once PRN Glucose <70 milliGRAM(s)/deciLiter  ondansetron Injectable 4 milliGRAM(s) IV Push every 6 hours PRN Nausea    Pertinent Labs:  Na144 mmol/L Glu 98 mg/dL K+ 3.3 mmol/L<L> Cr  0.51 mg/dL BUN 6 mg/dL<L>  Phos 1.7 mg/dL<L>  Alb 1.9 g/dL<L>     CAPILLARY BLOOD GLUCOSE    Skin: skin bruises noted     Estimated Needs:   [ X] no change since previous assessment  [ ] recalculated:     Previous Nutrition Diagnosis:   [ ] Inadequate Energy Intake [ ]Inadequate Oral Intake [ ] Excessive Energy Intake   [ ] Underweight [ ] Increased Nutrient Needs [ ] Overweight/Obesity   [ ] Altered GI Function [ ] Unintended Weight Loss [ ] Food & Nutrition Related Knowledge Deficit [Severe] Malnutrition     Nutrition Diagnosis is [X ] ongoing  [ ] resolved [ ] not applicable     New Nutrition Diagnosis: [ ] not applicable     Interventions: To meet nutrition needs   Recommend  [ ] Change Diet To:  [ ] Nutrition Supplement  [ ] Nutrition Support  [X ] Other: MVI/minerals daily as medically feasible     Monitoring and Evaluation:   [X ] PO intake [ x ] Tolerance to diet prescription [ x ] weights [ x ] labs[ x ] follow up per protocol  [ ] other:

## 2020-01-29 NOTE — PROGRESS NOTE ADULT - NEUROLOGICAL DETAILS
responds to verbal commands/responds to pain/sensation intact
responds to pain/sensation intact/responds to verbal commands
sensation intact/responds to pain/responds to verbal commands

## 2020-01-29 NOTE — PROGRESS NOTE ADULT - ASSESSMENT
64y old Female with small bowel obstruction, hypoglycemia, hypokalemia, hypophosphatemia    - Regular diet   - oob  - PT eval   - extra dose of lasix

## 2020-01-29 NOTE — PHYSICAL THERAPY INITIAL EVALUATION ADULT - GAIT DEVIATIONS NOTED, PT EVAL
decreased weight-shifting ability/decreased zoie/increased time in double stance/decreased step length/decreased stride length

## 2020-01-29 NOTE — PROGRESS NOTE ADULT - NEGATIVE ENMT SYMPTOMS
no hearing difficulty/no ear pain/no gum bleeding/no throat pain/no dysphagia/no nose bleeds/no dry mouth

## 2020-01-29 NOTE — PHYSICAL THERAPY INITIAL EVALUATION ADULT - LIVES WITH, PROFILE
daughter and grandaughter in apt-4th floor with elevator, no steps to enter. daughter and granddaughter in apt-4th floor with elevator, no steps to enter.

## 2020-01-29 NOTE — PHYSICAL THERAPY INITIAL EVALUATION ADULT - PASSIVE RANGE OF MOTION EXAMINATION, REHAB EVAL
bilateral upper extremity Passive ROM was WFL (within functional limits)/bilateral lower extremity Passive ROM was WFL (within functional limits)/B/L Ankle limited to ~neutral

## 2020-01-29 NOTE — PROGRESS NOTE ADULT - SUBJECTIVE AND OBJECTIVE BOX
feel ok  no sob  no abd pain, No N/V/D  feel better after transfusion    MEDICATIONS  (STANDING):  albumin human 25% IVPB 50 milliLiter(s) IV Intermittent once  dextrose 5% + sodium chloride 0.45% with potassium chloride 10 mEq/L 1000 milliLiter(s) (250 mL/Hr) IV Continuous <Continuous>  dextrose 5% + sodium chloride 0.45% with potassium chloride 10 mEq/L 500 milliLiter(s) (250 mL/Hr) IV Continuous <Continuous>  dextrose 5%. 1000 milliLiter(s) (50 mL/Hr) IV Continuous <Continuous>  dextrose 50% Injectable 12.5 Gram(s) IV Push once  dextrose 50% Injectable 25 Gram(s) IV Push once  dextrose 50% Injectable 25 Gram(s) IV Push once  furosemide   Injectable 40 milliGRAM(s) IV Push daily  potassium acid phosphate/sodium acid phosphate tablet (K-PHOS No. 2) 1 Tablet(s) Oral four times a day with meals  rivaroxaban 15 milliGRAM(s) Oral two times a day with meals    MEDICATIONS  (PRN):  dextrose 40% Gel 15 Gram(s) Oral once PRN Blood Glucose LESS THAN 70 milliGRAM(s)/deciLiter  glucagon  Injectable 1 milliGRAM(s) IntraMuscular once PRN Glucose <70 milliGRAM(s)/deciLiter  ondansetron Injectable 4 milliGRAM(s) IV Push every 6 hours PRN Nausea      Allergies    No Known Allergies    Intolerances        Vital Signs Last 24 Hrs  T(C): 36.9 (29 Jan 2020 06:00), Max: 36.9 (29 Jan 2020 06:00)  T(F): 98.4 (29 Jan 2020 06:00), Max: 98.4 (29 Jan 2020 06:00)  HR: 78 (29 Jan 2020 06:00) (75 - 79)  BP: 100/63 (29 Jan 2020 06:00) (94/64 - 105/66)  BP(mean): --  RR: 16 (29 Jan 2020 06:00) (16 - 17)  SpO2: 98% (29 Jan 2020 06:00) (98% - 100%)    PHYSICAL EXAM  General: adult in NAD  HEENT: clear oropharynx, anicteric sclera, pink conjunctiva  Neck: supple  CV: normal S1/S2 with no murmur rubs or gallops  Lungs: positive air movement b/l ant lungs,clear to auscultation, no wheezes, no rales  Abdomen: soft non-tender non-distended, no hepatosplenomegaly  Ext: no clubbing cyanosis or edema  Skin: no rashes and no petechiae  Neuro: alert and oriented X 4, no focal deficits  LABS:                          10.1   3.93  )-----------( 228      ( 28 Jan 2020 08:30 )             29.9         Mean Cell Volume : 85.4 fl  Mean Cell Hemoglobin : 28.9 pg  Mean Cell Hemoglobin Concentration : 33.8 gm/dL  Auto Neutrophil # : x  Auto Lymphocyte # : x  Auto Monocyte # : x  Auto Eosinophil # : x  Auto Basophil # : x  Auto Neutrophil % : x  Auto Lymphocyte % : x  Auto Monocyte % : x  Auto Eosinophil % : x  Auto Basophil % : x    Serial CBC  Hematocrit 29.9  Hemoglobin 10.1  Plat 228  RBC 3.50  WBC 3.93  Serial CBC  Hematocrit 28.7  Hemoglobin 9.5  Plat 241  RBC 3.30  WBC 4.27  Serial CBC  Hematocrit 27.4  Hemoglobin 9.1  Plat 238  RBC 3.09  WBC 4.86  Serial CBC  Hematocrit 23.9  Hemoglobin 7.7  Plat 284  RBC 2.69  WBC 4.67  Serial CBC  Hematocrit 23.5  Hemoglobin 7.6  Plat 256  RBC 2.63  WBC 4.88    01-28    144  |  114<H>  |  6<L>  ----------------------------<  98  3.3<L>   |  23  |  0.51    Ca    7.3<L>      28 Jan 2020 08:30  Phos  1.7     01-28  Mg     1.9     01-28            Reticulocyte Percent: 2.8 % (01-27 @ 08:24)            BLOOD SMEAR INTERPRETATION:       RADIOLOGY & ADDITIONAL STUDIES:

## 2020-01-29 NOTE — PROGRESS NOTE ADULT - SUBJECTIVE AND OBJECTIVE BOX
Patient seen and examined at bedside.   Admits to flatus and , complains of lower extremity stiffness     Vital Signs Last 24 Hrs  T(C): 36.9 (29 Jan 2020 06:00), Max: 36.9 (29 Jan 2020 06:00)  T(F): 98.4 (29 Jan 2020 06:00), Max: 98.4 (29 Jan 2020 06:00)  HR: 78 (29 Jan 2020 06:00) (75 - 79)  BP: 100/63 (29 Jan 2020 06:00) (94/64 - 105/66)  BP(mean): --  RR: 16 (29 Jan 2020 06:00) (16 - 17)  SpO2: 98% (29 Jan 2020 06:00) (98% - 100%)    GENERAL: Alert, NAD  CHEST/LUNG: respirations nonlabored  ABDOMEN: soft, mildly distended, non tender   EXTREMITIES:  + edema bilaterally         LABS:                                   10.1   3.93  )-----------( 228      ( 28 Jan 2020 08:30 )             29.9   01-28    144  |  114<H>  |  6<L>  ----------------------------<  98  3.3<L>   |  23  |  0.51    Ca    7.3<L>      28 Jan 2020 08:30  Phos  1.7     01-28  Mg     1.9     01-28

## 2020-01-29 NOTE — PROGRESS NOTE ADULT - NEGATIVE GASTROINTESTINAL SYMPTOMS
no abdominal pain/no melena/no jaundice/no nausea/no diarrhea/no vomiting/no hematochezia/no hiccoughs

## 2020-01-29 NOTE — PHYSICAL THERAPY INITIAL EVALUATION ADULT - CRITERIA FOR SKILLED THERAPEUTIC INTERVENTIONS
predicted duration of therapy intervention/anticipated discharge recommendation/therapy frequency/impairments found/rehab potential impairments found/rehab potential/predicted duration of therapy intervention/anticipated discharge recommendation/therapy frequency/risk reduction/prevention/functional limitations in following categories

## 2020-01-29 NOTE — PROGRESS NOTE ADULT - ASSESSMENT
· Assessment		  64 year old lady with multiple surgery for repeated SBO that caused malabsorption with low albumin, edema and ascites.  She has been anemic with baseline Hb about 8 wasa dimmted for werakness.  Hb dropped from 10 to 7 without bleeding noticed.    Problem/Recommendation - 1:  Problem: Anemia. Recommendation: she has been anemia, although all w/u for anemia is neg but probably it is due to malnutrition causing BM damage.  Her baseline is 8.  Hb was 10 at admission then dropped to 7 without bleeding  I think the reading of 10 at admission was a error. but I will r/o hemolysis.  so far there is no evidence of hemolysis  do FOBT    Problem/Recommendation - 2:  ·  Problem: Hypoalbuminemia.  Recommendation: short bowel syndrome causing malabsorption  the low albumin caused ascites and edema  can give albumin and lasix for diuresis.   still edematous with ascites  Problem/Recommendation - 3:  ·  Problem: DVT (deep venous thrombosis).  Recommendation: she was off xarelto since 1/24  now there is new L DVT  back on xarelto     Problem/Recommendation - 4:  ·  Problem: SBO (small bowel obstruction).  Recommendation: she has similar finding in x ray for long time  clinically she has bm and not N/V  no clinical obs  probably she always has partial SBO  no surgery needed.

## 2020-01-29 NOTE — PROGRESS NOTE ADULT - SUBJECTIVE AND OBJECTIVE BOX
[   ] ICU                                          [   ] CCU                                      [ x  ] Medical Floor    Patient is comfortable. No new complaints reported, No abdominal pain, N/V, hematemesis, hematochezia, melena, fever, chills, chest pain, SOB, cough or diarrhea reported.    VITALS  Vital Signs Last 24 Hrs  T(C): 36.4 (29 Jan 2020 14:42), Max: 36.9 (29 Jan 2020 06:00)  T(F): 97.5 (29 Jan 2020 14:42), Max: 98.4 (29 Jan 2020 06:00)  HR: 86 (29 Jan 2020 14:42) (74 - 101)  BP: 101/61 (29 Jan 2020 14:42) (95/63 - 105/66)     RR: 16 (29 Jan 2020 14:42) (16 - 17)  SpO2: 100% (29 Jan 2020 14:42) (98% - 100%)       MEDICATIONS  (STANDING):  dextrose 5% + sodium chloride 0.45% with potassium chloride 10 mEq/L 1000 milliLiter(s) (250 mL/Hr) IV Continuous <Continuous>  dextrose 5% + sodium chloride 0.45% with potassium chloride 10 mEq/L 500 milliLiter(s) (250 mL/Hr) IV Continuous <Continuous>  dextrose 5%. 1000 milliLiter(s) (50 mL/Hr) IV Continuous <Continuous>  dextrose 50% Injectable 12.5 Gram(s) IV Push once  dextrose 50% Injectable 25 Gram(s) IV Push once  dextrose 50% Injectable 25 Gram(s) IV Push once  furosemide   Injectable 40 milliGRAM(s) IV Push daily  potassium acid phosphate/sodium acid phosphate tablet (K-PHOS No. 2) 1 Tablet(s) Oral four times a day with meals  rivaroxaban 15 milliGRAM(s) Oral two times a day with meals    MEDICATIONS  (PRN):  dextrose 40% Gel 15 Gram(s) Oral once PRN Blood Glucose LESS THAN 70 milliGRAM(s)/deciLiter  glucagon  Injectable 1 milliGRAM(s) IntraMuscular once PRN Glucose <70 milliGRAM(s)/deciLiter  ondansetron Injectable 4 milliGRAM(s) IV Push every 6 hours PRN Nausea                            10.1   3.93  )-----------( 228      ( 28 Jan 2020 08:30 )             29.9       01-28    144  |  114<H>  |  6<L>  ----------------------------<  98  3.3<L>   |  23  |  0.51    Ca    7.3<L>      28 Jan 2020 08:30  Phos  1.7     01-28  Mg     1.9     01-28

## 2020-01-29 NOTE — PHYSICAL THERAPY INITIAL EVALUATION ADULT - ACTIVE RANGE OF MOTION EXAMINATION, REHAB EVAL
deficits as listed below/bilateral upper extremity Active ROM was WFL (within functional limits)/B/L Hip ROM limited 1/2 range, B/L Knee WFL, B/L Ankle limited 1/3 range.

## 2020-01-30 ENCOUNTER — TRANSCRIPTION ENCOUNTER (OUTPATIENT)
Age: 65
End: 2020-01-30

## 2020-01-30 VITALS
TEMPERATURE: 98 F | DIASTOLIC BLOOD PRESSURE: 52 MMHG | RESPIRATION RATE: 16 BRPM | SYSTOLIC BLOOD PRESSURE: 95 MMHG | HEART RATE: 95 BPM | OXYGEN SATURATION: 100 %

## 2020-01-30 LAB
ANION GAP SERPL CALC-SCNC: 7 MMOL/L — SIGNIFICANT CHANGE UP (ref 5–17)
BUN SERPL-MCNC: 6 MG/DL — LOW (ref 7–18)
CALCIUM SERPL-MCNC: 7.4 MG/DL — LOW (ref 8.4–10.5)
CHLORIDE SERPL-SCNC: 114 MMOL/L — HIGH (ref 96–108)
CO2 SERPL-SCNC: 23 MMOL/L — SIGNIFICANT CHANGE UP (ref 22–31)
CREAT SERPL-MCNC: 0.48 MG/DL — LOW (ref 0.5–1.3)
GLUCOSE SERPL-MCNC: 68 MG/DL — LOW (ref 70–99)
HCT VFR BLD CALC: 25.5 % — LOW (ref 34.5–45)
HCT VFR BLD CALC: 27.4 % — LOW (ref 34.5–45)
HGB BLD-MCNC: 8.6 G/DL — LOW (ref 11.5–15.5)
HGB BLD-MCNC: 9.1 G/DL — LOW (ref 11.5–15.5)
MCHC RBC-ENTMCNC: 29.1 PG — SIGNIFICANT CHANGE UP (ref 27–34)
MCHC RBC-ENTMCNC: 29.3 PG — SIGNIFICANT CHANGE UP (ref 27–34)
MCHC RBC-ENTMCNC: 33.2 GM/DL — SIGNIFICANT CHANGE UP (ref 32–36)
MCHC RBC-ENTMCNC: 33.7 GM/DL — SIGNIFICANT CHANGE UP (ref 32–36)
MCV RBC AUTO: 86.1 FL — SIGNIFICANT CHANGE UP (ref 80–100)
MCV RBC AUTO: 88.1 FL — SIGNIFICANT CHANGE UP (ref 80–100)
NRBC # BLD: 0 /100 WBCS — SIGNIFICANT CHANGE UP (ref 0–0)
NRBC # BLD: 0 /100 WBCS — SIGNIFICANT CHANGE UP (ref 0–0)
PLATELET # BLD AUTO: 167 K/UL — SIGNIFICANT CHANGE UP (ref 150–400)
PLATELET # BLD AUTO: 175 K/UL — SIGNIFICANT CHANGE UP (ref 150–400)
POTASSIUM SERPL-MCNC: 3.6 MMOL/L — SIGNIFICANT CHANGE UP (ref 3.5–5.3)
POTASSIUM SERPL-SCNC: 3.6 MMOL/L — SIGNIFICANT CHANGE UP (ref 3.5–5.3)
RBC # BLD: 2.96 M/UL — LOW (ref 3.8–5.2)
RBC # BLD: 3.11 M/UL — LOW (ref 3.8–5.2)
RBC # FLD: 20.1 % — HIGH (ref 10.3–14.5)
RBC # FLD: 20.4 % — HIGH (ref 10.3–14.5)
SODIUM SERPL-SCNC: 144 MMOL/L — SIGNIFICANT CHANGE UP (ref 135–145)
WBC # BLD: 4.98 K/UL — SIGNIFICANT CHANGE UP (ref 3.8–10.5)
WBC # BLD: 5.22 K/UL — SIGNIFICANT CHANGE UP (ref 3.8–10.5)
WBC # FLD AUTO: 4.98 K/UL — SIGNIFICANT CHANGE UP (ref 3.8–10.5)
WBC # FLD AUTO: 5.22 K/UL — SIGNIFICANT CHANGE UP (ref 3.8–10.5)

## 2020-01-30 PROCEDURE — 80307 DRUG TEST PRSMV CHEM ANLYZR: CPT

## 2020-01-30 PROCEDURE — P9047: CPT

## 2020-01-30 PROCEDURE — 80074 ACUTE HEPATITIS PANEL: CPT

## 2020-01-30 PROCEDURE — 86900 BLOOD TYPING SEROLOGIC ABO: CPT

## 2020-01-30 PROCEDURE — 85027 COMPLETE CBC AUTOMATED: CPT

## 2020-01-30 PROCEDURE — 96375 TX/PRO/DX INJ NEW DRUG ADDON: CPT

## 2020-01-30 PROCEDURE — 85610 PROTHROMBIN TIME: CPT

## 2020-01-30 PROCEDURE — 99285 EMERGENCY DEPT VISIT HI MDM: CPT | Mod: 25

## 2020-01-30 PROCEDURE — 83615 LACTATE (LD) (LDH) ENZYME: CPT

## 2020-01-30 PROCEDURE — P9040: CPT

## 2020-01-30 PROCEDURE — 96361 HYDRATE IV INFUSION ADD-ON: CPT

## 2020-01-30 PROCEDURE — 83690 ASSAY OF LIPASE: CPT

## 2020-01-30 PROCEDURE — 93005 ELECTROCARDIOGRAM TRACING: CPT

## 2020-01-30 PROCEDURE — 85730 THROMBOPLASTIN TIME PARTIAL: CPT

## 2020-01-30 PROCEDURE — 80048 BASIC METABOLIC PNL TOTAL CA: CPT

## 2020-01-30 PROCEDURE — 93306 TTE W/DOPPLER COMPLETE: CPT

## 2020-01-30 PROCEDURE — 82272 OCCULT BLD FECES 1-3 TESTS: CPT

## 2020-01-30 PROCEDURE — 83605 ASSAY OF LACTIC ACID: CPT

## 2020-01-30 PROCEDURE — 99232 SBSQ HOSP IP/OBS MODERATE 35: CPT

## 2020-01-30 PROCEDURE — 93970 EXTREMITY STUDY: CPT

## 2020-01-30 PROCEDURE — 80053 COMPREHEN METABOLIC PANEL: CPT

## 2020-01-30 PROCEDURE — 85045 AUTOMATED RETICULOCYTE COUNT: CPT

## 2020-01-30 PROCEDURE — 86901 BLOOD TYPING SEROLOGIC RH(D): CPT

## 2020-01-30 PROCEDURE — 84484 ASSAY OF TROPONIN QUANT: CPT

## 2020-01-30 PROCEDURE — 82140 ASSAY OF AMMONIA: CPT

## 2020-01-30 PROCEDURE — 74177 CT ABD & PELVIS W/CONTRAST: CPT

## 2020-01-30 PROCEDURE — 82728 ASSAY OF FERRITIN: CPT

## 2020-01-30 PROCEDURE — 83880 ASSAY OF NATRIURETIC PEPTIDE: CPT

## 2020-01-30 PROCEDURE — 86850 RBC ANTIBODY SCREEN: CPT

## 2020-01-30 PROCEDURE — 74174 CTA ABD&PLVS W/CONTRAST: CPT

## 2020-01-30 PROCEDURE — 74019 RADEX ABDOMEN 2 VIEWS: CPT

## 2020-01-30 PROCEDURE — 97162 PT EVAL MOD COMPLEX 30 MIN: CPT

## 2020-01-30 PROCEDURE — 84100 ASSAY OF PHOSPHORUS: CPT

## 2020-01-30 PROCEDURE — 83735 ASSAY OF MAGNESIUM: CPT

## 2020-01-30 PROCEDURE — 87631 RESP VIRUS 3-5 TARGETS: CPT

## 2020-01-30 PROCEDURE — 71045 X-RAY EXAM CHEST 1 VIEW: CPT

## 2020-01-30 PROCEDURE — 36415 COLL VENOUS BLD VENIPUNCTURE: CPT

## 2020-01-30 PROCEDURE — 86923 COMPATIBILITY TEST ELECTRIC: CPT

## 2020-01-30 PROCEDURE — 96374 THER/PROPH/DIAG INJ IV PUSH: CPT

## 2020-01-30 PROCEDURE — 36430 TRANSFUSION BLD/BLD COMPNT: CPT

## 2020-01-30 PROCEDURE — 83010 ASSAY OF HAPTOGLOBIN QUANT: CPT

## 2020-01-30 RX ORDER — ALBUMIN HUMAN 25 %
50 VIAL (ML) INTRAVENOUS ONCE
Refills: 0 | Status: COMPLETED | OUTPATIENT
Start: 2020-01-30 | End: 2020-01-30

## 2020-01-30 RX ORDER — ERGOCALCIFEROL 1.25 MG/1
0 CAPSULE ORAL
Qty: 0 | Refills: 0 | DISCHARGE

## 2020-01-30 RX ORDER — RIVAROXABAN 15 MG-20MG
1 KIT ORAL
Qty: 0 | Refills: 0 | DISCHARGE
Start: 2020-01-30

## 2020-01-30 RX ADMIN — Medication 50 MILLILITER(S): at 12:21

## 2020-01-30 RX ADMIN — Medication 1 TABLET(S): at 17:10

## 2020-01-30 RX ADMIN — RIVAROXABAN 15 MILLIGRAM(S): KIT at 08:03

## 2020-01-30 RX ADMIN — Medication 1 TABLET(S): at 12:20

## 2020-01-30 RX ADMIN — RIVAROXABAN 15 MILLIGRAM(S): KIT at 17:10

## 2020-01-30 RX ADMIN — Medication 1 TABLET(S): at 08:03

## 2020-01-30 NOTE — PROGRESS NOTE ADULT - SUBJECTIVE AND OBJECTIVE BOX
Patient was seen and examined  Patient is a 64y old  Female who presents with a chief complaint of sbo (30 Jan 2020 09:04)      INTERVAL HPI/OVERNIGHT EVENTS:  T(C): 37.4 (01-30-20 @ 05:30), Max: 37.4 (01-30-20 @ 05:30)  HR: 85 (01-30-20 @ 05:30) (74 - 101)  BP: 107/59 (01-30-20 @ 05:30) (95/63 - 107/59)  RR: 16 (01-30-20 @ 05:30) (16 - 16)  SpO2: 99% (01-30-20 @ 05:30) (98% - 100%)  Wt(kg): --  I&O's Summary      LABS:                        8.6    4.98  )-----------( 167      ( 30 Jan 2020 07:15 )             25.5     01-30    144  |  114<H>  |  6<L>  ----------------------------<  68<L>  3.6   |  23  |  0.48<L>    Ca    7.4<L>      30 Jan 2020 07:15          CAPILLARY BLOOD GLUCOSE                  MEDICATIONS  (STANDING):  dextrose 5% + sodium chloride 0.45% with potassium chloride 10 mEq/L 1000 milliLiter(s) (250 mL/Hr) IV Continuous <Continuous>  dextrose 5% + sodium chloride 0.45% with potassium chloride 10 mEq/L 500 milliLiter(s) (250 mL/Hr) IV Continuous <Continuous>  dextrose 5%. 1000 milliLiter(s) (50 mL/Hr) IV Continuous <Continuous>  dextrose 50% Injectable 12.5 Gram(s) IV Push once  dextrose 50% Injectable 25 Gram(s) IV Push once  dextrose 50% Injectable 25 Gram(s) IV Push once  furosemide   Injectable 40 milliGRAM(s) IV Push daily  potassium acid phosphate/sodium acid phosphate tablet (K-PHOS No. 2) 1 Tablet(s) Oral four times a day with meals  rivaroxaban 15 milliGRAM(s) Oral two times a day with meals    MEDICATIONS  (PRN):  dextrose 40% Gel 15 Gram(s) Oral once PRN Blood Glucose LESS THAN 70 milliGRAM(s)/deciLiter  glucagon  Injectable 1 milliGRAM(s) IntraMuscular once PRN Glucose <70 milliGRAM(s)/deciLiter  ondansetron Injectable 4 milliGRAM(s) IV Push every 6 hours PRN Nausea      RADIOLOGY & ADDITIONAL TESTS:    Imaging Personally Reviewed:  [ ] YES  [ ] NO    REVIEW OF SYSTEMS:  CONSTITUTIONAL: No fever, weight loss, or fatigue  EYES: No eye pain, visual disturbances, or discharge  ENMT:  No difficulty hearing, tinnitus, vertigo; No sinus or throat pain  NECK: No pain or stiffness  BREASTS: No pain, masses, or nipple discharge  RESPIRATORY: No cough, wheezing, chills or hemoptysis; No shortness of breath  CARDIOVASCULAR: No chest pain, palpitations, dizziness, or leg swelling  GASTROINTESTINAL: No abdominal or epigastric pain. No nausea, vomiting, or hematemesis; No diarrhea or constipation. No melena or hematochezia.  GENITOURINARY: No dysuria, frequency, hematuria, or incontinence  NEUROLOGICAL: No headaches, memory loss, loss of strength, numbness, or tremors  SKIN: No itching, burning, rashes, or lesions   LYMPH NODES: No enlarged glands  ENDOCRINE: No heat or cold intolerance; No hair loss  MUSCULOSKELETAL: No joint pain or swelling; No muscle, back, or extremity pain  PSYCHIATRIC: No depression, anxiety, mood swings, or difficulty sleeping  HEME/LYMPH: No easy bruising, or bleeding gums  ALLERY AND IMMUNOLOGIC: No hives or eczema      Consultant(s) Notes Reviewed:  [ x ] YES  [ ] NO    PHYSICAL EXAM:  GENERAL: NAD, well-groomed, well-developed  HEAD:  Atraumatic, Normocephalic  EYES: EOMI, PERRLA, conjunctiva and sclera clear  ENMT: No tonsillar erythema, exudates, or enlargement; Moist mucous membranes, Good dentition, No lesions  NECK: Supple, No JVD, Normal thyroid  NERVOUS SYSTEM:  Alert & Oriented X3, Good concentration; Motor Strength 5/5 B/L upper and lower extremities; DTRs 2+ intact and symmetric  CHEST/LUNG: Clear to percussion bilaterally; No rales, rhonchi, wheezing, or rubs  HEART: Regular rate and rhythm; No murmurs, rubs, or gallops  ABDOMEN: distended soft   EXTREMITIES:  edema  LYMPH: No lymphadenopathy noted  SKIN: No rashes or lesions    Care Discussed with Consultants/Other Providers [ x] YES  [ ] NO

## 2020-01-30 NOTE — DISCHARGE NOTE PROVIDER - CARE PROVIDER_API CALL
Yessica Ross)  [address], [place] [address]  Phone: [phone]  Fax: [phone]  Follow Up Time:

## 2020-01-30 NOTE — PROGRESS NOTE ADULT - SUBJECTIVE AND OBJECTIVE BOX
INTERVAL HPI/OVERNIGHT EVENTS:    No acute events overnight.   Pt resting comfortably. No acute complaints.   Tolerating diet.   Denies n/v/f/c, +flatus, no abd pain    MEDICATIONS  (STANDING):  albumin human 25% IVPB 50 milliLiter(s) IV Intermittent once  dextrose 5% + sodium chloride 0.45% with potassium chloride 10 mEq/L 1000 milliLiter(s) (250 mL/Hr) IV Continuous <Continuous>  dextrose 5% + sodium chloride 0.45% with potassium chloride 10 mEq/L 500 milliLiter(s) (250 mL/Hr) IV Continuous <Continuous>  dextrose 5%. 1000 milliLiter(s) (50 mL/Hr) IV Continuous <Continuous>  dextrose 50% Injectable 12.5 Gram(s) IV Push once  dextrose 50% Injectable 25 Gram(s) IV Push once  dextrose 50% Injectable 25 Gram(s) IV Push once  furosemide   Injectable 40 milliGRAM(s) IV Push daily  potassium acid phosphate/sodium acid phosphate tablet (K-PHOS No. 2) 1 Tablet(s) Oral four times a day with meals  rivaroxaban 15 milliGRAM(s) Oral two times a day with meals    MEDICATIONS  (PRN):  dextrose 40% Gel 15 Gram(s) Oral once PRN Blood Glucose LESS THAN 70 milliGRAM(s)/deciLiter  glucagon  Injectable 1 milliGRAM(s) IntraMuscular once PRN Glucose <70 milliGRAM(s)/deciLiter  ondansetron Injectable 4 milliGRAM(s) IV Push every 6 hours PRN Nausea      Vital Signs Last 24 Hrs  T(C): 37.4 (30 Jan 2020 05:30), Max: 37.4 (30 Jan 2020 05:30)  T(F): 99.3 (30 Jan 2020 05:30), Max: 99.3 (30 Jan 2020 05:30)  HR: 85 (30 Jan 2020 05:30) (74 - 101)  BP: 107/59 (30 Jan 2020 05:30) (95/63 - 107/59)  RR: 16 (30 Jan 2020 05:30) (16 - 16)  SpO2: 99% (30 Jan 2020 05:30) (98% - 100%)      PHYSICAL EXAM  General: Alert and oriented, not in acute distress  Resp: Breathing unlabored  Abdomen: Soft, nondistended, nontender  Extremities: 2+ pitting edema UE and LE    I&O's Detail      LABS:                        8.6    4.98  )-----------( 167      ( 30 Jan 2020 07:15 )             25.5             01-30    144  |  114<H>  |  6<L>  ----------------------------<  68<L>  3.6   |  23  |  0.48<L>    Ca    7.4<L>      30 Jan 2020 07:15

## 2020-01-30 NOTE — DISCHARGE NOTE PROVIDER - NSDCMRMEDTOKEN_GEN_ALL_CORE_FT
Calcium 600+D oral tablet: 1 tab(s) orally once a day  docusate sodium 100 mg oral tablet: 1 tab(s) orally 2 times a day  ferrous sulfate 325 mg (65 mg elemental iron) oral tablet: 1 tab(s) orally once a day  folic acid 0.4 mg oral tablet: 1 tab(s) orally once a day  Lasix 20 mg oral tablet: 1 tab(s) orally 2 times a day   mirtazapine 7.5 mg oral tablet: 1 tab(s) orally once a day (at bedtime)  raNITIdine 150 mg oral capsule: 1 cap(s) orally 2 times a day  rivaroxaban 15 mg oral tablet: 1 tab(s) orally 2 times a day (with meals)  spironolactone 25 mg oral tablet: 1 tab(s) orally once a day  Tab-A-Lasha oral tablet: 1 tab(s) orally once a day

## 2020-01-30 NOTE — PROGRESS NOTE ADULT - ASSESSMENT
64 yoF with resolved SBO    tolerating diet with bowel function, K repleted  h/h downtrended, no evidence of acute bleeding, abdomen benign  + anasarca    - continue regular diet as tolerated  - amb, oob  - replete electrolytes as needed  - cont care per primary  - recommend f/u IR to reeval IVCF  - d/w attending 64 yoF with resolved SBO    tolerating diet with bowel function, K repleted  h/h downtrended, no evidence of acute bleeding, abdomen benign  + anasarca    - continue regular diet as tolerated  - amb, oob  - replete electrolytes as needed  - cont care per primary   - d/w attending

## 2020-01-30 NOTE — DISCHARGE NOTE PROVIDER - HOSPITAL COURSE
Left message on machine. Request patient to call the office to discuss surgery scheduling.  
HPI:    65yo female with hx of recurrent SBO's, s/p exp lap, SB resection in 2015, ex lap, ALBINA in 2018, DVT, PE, on xarelto, IVC filter, chronic leg swelling, and anasarca p/w generalized weakness, nausea, vomiting x 1 day. Reports last BM last night.  Denies any abd pain, fever, cp, sob, diarrhea or sick contacts. (24 Jan 2020 20:01)        Hospital course:        Patient presented with SBO. She was evaluated by surgery. Patient refused any surgical intervention as per Dr Ross. Patient's symptoms resolved with conservative management. Diet was advanced. patient is medically stable for discharge to home as per Dr Ross.         Plan of care discussed with patient. Patient understand and agree with the plan. patient will be discharged to home in stable condition.        For further details follow up patient's chart as this is only a brief summary.

## 2020-01-30 NOTE — PROGRESS NOTE ADULT - ASSESSMENT
65yo female with SBO, concerns for closed loop on CT, hx PE/dvt on xarelto. Patient admitted to surgery for management of SBO. Medicine consulted due to ascites. CT abdomen does not show liver cirrhosis, but will initiate albumin q6 and lasix to help initiate diuresis. Hold off on spironolactone as bp will likely not allow at present. Will pursue CTA abdomen/pelvis to r/o portal vein thrombosis.    anemia h and h droped   repeat cbc stat prbc   iv albumin and iv lasix   karime rn at bedside

## 2020-01-30 NOTE — DISCHARGE NOTE NURSING/CASE MANAGEMENT/SOCIAL WORK - PATIENT PORTAL LINK FT
You can access the FollowMyHealth Patient Portal offered by [facility] by registering at the following website: [email or web address]. By joining [employer]’s FollowMyHealth portal, you will also be able to view your health information using other applications (apps) compatible with our system.

## 2020-01-30 NOTE — DISCHARGE NOTE PROVIDER - NSDCCPCAREPLAN_GEN_ALL_CORE_FT
PRINCIPAL DISCHARGE DIAGNOSIS  Diagnosis: SBO (small bowel obstruction)  Assessment and Plan of Treatment: Patient presented with SBO. She was evaluated by surgery. Patient refused any surgical intervention as per Dr Ross. Patient's symptoms resolved with conservative management. Diet was advanced. patient is medically stable for discharge to home as per Dr Ross.      SECONDARY DISCHARGE DIAGNOSES  Diagnosis: Ascites  Assessment and Plan of Treatment: Patient had new onset ascites. Refused Paracentesis. Continue with Lasix and spironolactone. Follow up out patient with Dr Ross.    Diagnosis: DVT (deep venous thrombosis)  Assessment and Plan of Treatment: Please continue with xarelto 15mg twice daily for 21 days and then 20mg daily. Follow up with Dr Ross as outpatient.

## 2020-01-30 NOTE — PROGRESS NOTE ADULT - SUBJECTIVE AND OBJECTIVE BOX
feel better  no  sob  or chest pain  leg swelling still present  no fever , no bleeding    MEDICATIONS  (STANDING):  dextrose 5% + sodium chloride 0.45% with potassium chloride 10 mEq/L 1000 milliLiter(s) (250 mL/Hr) IV Continuous <Continuous>  dextrose 5% + sodium chloride 0.45% with potassium chloride 10 mEq/L 500 milliLiter(s) (250 mL/Hr) IV Continuous <Continuous>  dextrose 5%. 1000 milliLiter(s) (50 mL/Hr) IV Continuous <Continuous>  dextrose 50% Injectable 12.5 Gram(s) IV Push once  dextrose 50% Injectable 25 Gram(s) IV Push once  dextrose 50% Injectable 25 Gram(s) IV Push once  furosemide   Injectable 40 milliGRAM(s) IV Push daily  potassium acid phosphate/sodium acid phosphate tablet (K-PHOS No. 2) 1 Tablet(s) Oral four times a day with meals  rivaroxaban 15 milliGRAM(s) Oral two times a day with meals    MEDICATIONS  (PRN):  dextrose 40% Gel 15 Gram(s) Oral once PRN Blood Glucose LESS THAN 70 milliGRAM(s)/deciLiter  glucagon  Injectable 1 milliGRAM(s) IntraMuscular once PRN Glucose <70 milliGRAM(s)/deciLiter  ondansetron Injectable 4 milliGRAM(s) IV Push every 6 hours PRN Nausea      Allergies    No Known Allergies    Intolerances        Vital Signs Last 24 Hrs  T(C): 37.4 (30 Jan 2020 05:30), Max: 37.4 (30 Jan 2020 05:30)  T(F): 99.3 (30 Jan 2020 05:30), Max: 99.3 (30 Jan 2020 05:30)  HR: 85 (30 Jan 2020 05:30) (74 - 101)  BP: 107/59 (30 Jan 2020 05:30) (95/63 - 107/59)  BP(mean): --  RR: 16 (30 Jan 2020 05:30) (16 - 16)  SpO2: 99% (30 Jan 2020 05:30) (98% - 100%)    PHYSICAL EXAM  General: adult in NAD  HEENT: clear oropharynx, anicteric sclera, pink conjunctiva  Neck: supple  CV: normal S1/S2 with no murmur rubs or gallops  Lungs: positive air movement b/l ant lungs,clear to auscultation, no wheezes, no rales  Abdomen: soft non-tender non-distended, no hepatosplenomegaly  Ext: no clubbing cyanosis or edema  Skin: no rashes and no petechiae  Neuro: alert and oriented X 4, no focal deficits  LABS:                          8.6    4.98  )-----------( 167      ( 30 Jan 2020 07:15 )             25.5         Mean Cell Volume : 86.1 fl  Mean Cell Hemoglobin : 29.1 pg  Mean Cell Hemoglobin Concentration : 33.7 gm/dL  Auto Neutrophil # : x  Auto Lymphocyte # : x  Auto Monocyte # : x  Auto Eosinophil # : x  Auto Basophil # : x  Auto Neutrophil % : x  Auto Lymphocyte % : x  Auto Monocyte % : x  Auto Eosinophil % : x  Auto Basophil % : x    Serial CBC  Hematocrit 25.5  Hemoglobin 8.6  Plat 167  RBC 2.96  WBC 4.98  Serial CBC  Hematocrit 29.9  Hemoglobin 10.1  Plat 228  RBC 3.50  WBC 3.93  Serial CBC  Hematocrit 28.7  Hemoglobin 9.5  Plat 241  RBC 3.30  WBC 4.27  Serial CBC  Hematocrit 27.4  Hemoglobin 9.1  Plat 238  RBC 3.09  WBC 4.86  Serial CBC  Hematocrit 23.9  Hemoglobin 7.7  Plat 284  RBC 2.69  WBC 4.67    01-30    144  |  114<H>  |  6<L>  ----------------------------<  68<L>  3.6   |  23  |  0.48<L>    Ca    7.4<L>      30 Jan 2020 07:15            Ferritin, Serum: 164 ng/mL (01-29 @ 18:40)  Reticulocyte Percent: 2.8 % (01-27 @ 08:24)            BLOOD SMEAR INTERPRETATION:       RADIOLOGY & ADDITIONAL STUDIES:

## 2020-03-08 NOTE — H&P ADULT - NSHPSOCIALHISTORY_GEN_ALL_CORE
Patient states she was a former smoker when she was younger. Denies any current smoking, alcohol use or use of illicit drugs.

## 2020-03-08 NOTE — ED ADULT TRIAGE NOTE - CHIEF COMPLAINT QUOTE
c/o feeling weak and dizzy , reports pmd called her Thursday for low hgb/hct was told to go to her office or to the ER

## 2020-03-08 NOTE — ED PROVIDER NOTE - OBJECTIVE STATEMENT
65 y/o female with 63 y/o female with PMHx of DVT, PE, and SBO presents to the ED due to low hemoglobin level (approximately 7) 1 week ago. Pt c/o generalized weakness and dizziness. Pt denies any blood in stool, hematuria, hematemesis, easy bruising, abd pain, or other complaints. Pt had endoscopy and colonoscopy done on 1/29/20 with Dr. Ugarte, stating no evidence of GI bleed; pt has portal vein thrombosis and ascites. Dr. Rivera is also on board for anemia and is unsure of the cause as well. Pt has been worked up during previous admission. 65 y/o female with PMHx of recurrent SBO's, s/p exp lap, SB resection in 2015, ex lap, ALBINA in 2018, DVT, PE, on xarelto, IVC filter, chronic leg swelling, and anasarca presents to the ED due to low hemoglobin level (approximately 7) 1 week ago. Pt c/o generalized weakness and dizziness. Pt denies any blood in stool, hematuria, hematemesis, easy bruising, abd pain, or other complaints. Pt had endoscopy and colonoscopy done on 1/29/20 with Dr. Ugarte, stating no evidence of GI bleed; pt has portal vein thrombosis and ascites. Dr. Rivera is also on board for anemia and is unsure of the cause as well. Pt has been worked up during previous admission. 63 y/o female with PMHx of recurrent SBO's, s/p exp lap, SB resection in 2015, ex lap, ALBINA in 2018, DVT, PE, on xarelto, IVC filter, chronic leg swelling, and anasarca presents to the ED due to low hemoglobin level (approximately 7) 1 week ago. Pt c/o generalized weakness and dizziness. Pt denies any blood in stool, hematuria, hematemesis, easy bruising, abd pain, or other complaints. Pt had endoscopy and colonoscopy done on 1/29/20 with Dr. Chatterjee stating no evidence of GI bleed; pt has portal vein thrombosis and ascites. Dr. Rivera is also on board for anemia and is unsure of the cause as well. Pt has been worked up during previous admission.

## 2020-03-08 NOTE — H&P ADULT - PROBLEM SELECTOR PLAN 1
patient sent from PCP, Dr. Ross for low hemoglobin of around 7  receiving 1 unit PRBC  f/u post transfusion CBC  GI Dr. Chatterjee consulted  plan for EGD   monitor CBC patient sent from PCP, Dr. Ross for low hemoglobin of around 7  receiving 1 unit PRBC  f/u post transfusion CBC  patient states she has dark stools but has started when she started taking iron supplements but denies any other signs of bleeding  GI Dr. Chatterjee consulted  plan for EGD   monitor CBC

## 2020-03-08 NOTE — ED ADULT NURSE NOTE - ED STAT RN HANDOFF DETAILS
Patient admitted to medicine, no bed assigned as yet . Endorsed to PIO Li patient to received one unit of PRBC no blood ready as yet. Patient being transported via stretcher by PCA stable in no acute distress safety maintained.

## 2020-03-08 NOTE — H&P ADULT - ASSESSMENT
Patient is a 64 year old female from home walks with cane, with PMH of recurrent SBO's, s/p exp lap, SB resection in 2015, ex lap, ALBINA in 2018, DVT, PE, on xarelto, IVC filter, chronic leg swelling, and anasarca, who comes in to the ED after being sent by PCP, Dr. Ross for low hemoglobin of around 7.    Given 1L NS bolus in ED.  Receiving 1 unit PRBC in ED.     Admitted for anemia.

## 2020-03-08 NOTE — ED PROVIDER NOTE - CLINICAL SUMMARY MEDICAL DECISION MAKING FREE TEXT BOX
65 y/o female presents due to anemia; unsure of definitive cause, likely AOCD. Will check blood work, give 1 unit of blood, +- admission.

## 2020-03-08 NOTE — H&P ADULT - PROBLEM SELECTOR PLAN 5
IMPROVE VTE Individual Risk Assessment  RISK                                                                Points  [X ] Previous VTE                                                  3  [  ] Thrombophilia                                               2  [  ] Lower limb paralysis                                      2        (unable to hold up >15 seconds)    [  ] Current Cancer                                              2         (within 6 months)  [  ] Immobilization > 24 hrs                                1  [  ] ICU/CCU stay > 24 hours                              1  [X ] Age > 60                                                      1    IMPROVE VTE Score 4  hold xarelto due to low Hgb  SCDs for now patient has history of recurrent SBO  patient currently denies any abdominal pain, nausea or vomiting  denies any diarrhea or constipation  monitor for now   GI Dr. Chatterjee

## 2020-03-08 NOTE — H&P ADULT - HISTORY OF PRESENT ILLNESS
Patient is a 64 year old female from home walks with cane, with PMH of recurrent SBO's, s/p exp lap, SB resection in 2015, ex lap, ALBINA in 2018, DVT, PE, on xarelto, IVC filter, chronic leg swelling, and anasarca, who comes in to the ED after being sent by PCP, Dr. Ross for low hemoglobin of around 7. Based on prior admission, patient's hemoglobin seemed to be around 9. Patient had endoscopy and colonoscopy done on 1/29/20 with Dr. Chatterjee stating no evidence of GI bleed. Patient states that she has dark stools but states it has been starting since she has been on iron supplements. States having some general weakness in the knees but states that it is not new. Patient denies any dizziness, lightheadedness, BRBPR, hematemesis, chest pain, SOB, nausea, vomiting, diarrhea or constipation.

## 2020-03-08 NOTE — ED PROVIDER NOTE - PMH
DVT (deep venous thrombosis)    Pulmonary embolism    SBO (small bowel obstruction) Anasarca    DVT (deep venous thrombosis)    Pulmonary embolism    SBO (small bowel obstruction)

## 2020-03-08 NOTE — ED ADULT NURSE NOTE - NSIMPLEMENTINTERV_GEN_ALL_ED
Implemented All Fall with Harm Risk Interventions:  Angela to call system. Call bell, personal items and telephone within reach. Instruct patient to call for assistance. Room bathroom lighting operational. Non-slip footwear when patient is off stretcher. Physically safe environment: no spills, clutter or unnecessary equipment. Stretcher in lowest position, wheels locked, appropriate side rails in place. Provide visual cue, wrist band, yellow gown, etc. Monitor gait and stability. Monitor for mental status changes and reorient to person, place, and time. Review medications for side effects contributing to fall risk. Reinforce activity limits and safety measures with patient and family. Provide visual clues: red socks.

## 2020-03-08 NOTE — H&P ADULT - PROBLEM SELECTOR PLAN 2
patient on spironolactone for portal vein thrombosis and anasarca  will hold spironolactone for now due to patient having low BP   monitor BP and can resume spironolactone when feasible patient on spironolactone for portal vein thrombosis and anasarca  will hold spironolactone for now due to patient having low BP  f/u abdominal x-ray for evaluation of ascites as per GI Dr. Chatterjee   monitor BP and can resume spironolactone when feasible

## 2020-03-08 NOTE — H&P ADULT - PROBLEM SELECTOR PLAN 4
history of DVT  patient on xarelto 15mg   will hold due to low hemoglobin  SCDs history of PE  patient on xarelto 15mg   will hold due to low hemoglobin  SCDs

## 2020-03-08 NOTE — H&P ADULT - NSICDXPASTMEDICALHX_GEN_ALL_CORE_FT
PAST MEDICAL HISTORY:  Anasarca     DVT (deep venous thrombosis)     Pulmonary embolism     SBO (small bowel obstruction)

## 2020-03-08 NOTE — H&P ADULT - PROBLEM SELECTOR PLAN 6
IMPROVE VTE Individual Risk Assessment  RISK                                                                Points  [X ] Previous VTE                                                  3  [  ] Thrombophilia                                               2  [  ] Lower limb paralysis                                      2        (unable to hold up >15 seconds)    [  ] Current Cancer                                              2         (within 6 months)  [  ] Immobilization > 24 hrs                                1  [  ] ICU/CCU stay > 24 hours                              1  [X ] Age > 60                                                      1    IMPROVE VTE Score 4  hold xarelto due to low Hgb  SCDs for now

## 2020-03-09 NOTE — DISCHARGE NOTE PROVIDER - NSDCMRMEDTOKEN_GEN_ALL_CORE_FT
Calcium 600+D oral tablet: 1 tab(s) orally once a day  docusate sodium 100 mg oral tablet: 1 tab(s) orally 2 times a day  ferrous sulfate 325 mg (65 mg elemental iron) oral tablet: 1 tab(s) orally once a day  folic acid 0.4 mg oral tablet: 1 tab(s) orally once a day  pantoprazole 40 mg oral delayed release tablet: 1 tab(s) orally once a day  raNITIdine 150 mg oral capsule: 1 cap(s) orally once a day  senna oral tablet: 2 tab(s) orally once a day (at bedtime)  Tab-A-Lasha oral tablet: 1 tab(s) orally once a day  Vitamin D3 50,000 intl units (1250 mcg) oral capsule: 1 cap(s) orally once a week Calcium 600+D oral tablet: 1 tab(s) orally once a day  docusate sodium 100 mg oral tablet: 1 tab(s) orally 2 times a day  ferrous sulfate 325 mg (65 mg elemental iron) oral tablet: 1 tab(s) orally once a day  folic acid 0.4 mg oral tablet: 1 tab(s) orally once a day  pantoprazole 40 mg oral delayed release tablet: 1 tab(s) orally once a day  raNITIdine 150 mg oral capsule: 1 cap(s) orally once a day  senna oral tablet: 2 tab(s) orally once a day (at bedtime)  Tab-A-Lasha oral tablet: 1 tab(s) orally once a day Calcium 600+D oral tablet: 1 tab(s) orally once a day  docusate sodium 100 mg oral tablet: 1 tab(s) orally 2 times a day  ferrous sulfate 325 mg (65 mg elemental iron) oral tablet: 1 tab(s) orally once a day  folic acid 0.4 mg oral tablet: 1 tab(s) orally once a day  pantoprazole 40 mg oral delayed release tablet: 1 tab(s) orally once a day  raNITIdine 150 mg oral capsule: 1 cap(s) orally once a day  senna oral tablet: 2 tab(s) orally once a day (at bedtime)  Tab-A-Lasha oral tablet: 1 tab(s) orally once a day  Xarelto 15 mg oral tablet: 1 tab(s) orally once a day (in the evening)

## 2020-03-09 NOTE — PROGRESS NOTE ADULT - SUBJECTIVE AND OBJECTIVE BOX
PGY 1 Note discussed with primary attending    Patient is a 64y old  Female who presents with a chief complaint of low hemoglobin (08 Mar 2020 18:22)      INTERVAL HPI/OVERNIGHT EVENTS:   No acute event overnight reported by overnight team and nurses. Pt remained hemodynamically stable.  Pt seen and examined  at bed side. All concerns and questions answered.   Report no new complaint. Pt denies any fever, nausea, vomiting.  Explained all test results and plan.  Appreciate all consults.   MEDICATIONS  (STANDING):  ferrous    sulfate 325 milliGRAM(s) Oral daily  folic acid 1 milliGRAM(s) Oral daily  multivitamin 1 Tablet(s) Oral daily  pantoprazole  Injectable 40 milliGRAM(s) IV Push two times a day  senna 2 Tablet(s) Oral at bedtime    MEDICATIONS  (PRN):      __________________________________________________  REVIEW OF SYSTEMS:    CONSTITUTIONAL: No fever,   EYES: no acute visual disturbances  NECK: No pain or stiffness  RESPIRATORY: No cough; No shortness of breath  CARDIOVASCULAR: No chest pain, no palpitations  GASTROINTESTINAL: No pain. No nausea or vomiting; No diarrhea   NEUROLOGICAL: No headache or numbness, no tremors  MUSCULOSKELETAL: No joint pain, no muscle pain  GENITOURINARY: no dysuria, no frequency, no hesitancy  PSYCHIATRY: no depression , no anxiety  ALL OTHER  ROS negative        Vital Signs Last 24 Hrs  T(C): 36.7 (08 Mar 2020 21:45), Max: 36.9 (08 Mar 2020 16:58)  T(F): 98 (08 Mar 2020 21:45), Max: 98.4 (08 Mar 2020 16:58)  HR: 88 (08 Mar 2020 21:45) (76 - 100)  BP: 101/64 (08 Mar 2020 21:45) (96/63 - 107/73)  BP(mean): 81 (08 Mar 2020 18:55) (71 - 81)  RR: 18 (08 Mar 2020 21:45) (18 - 18)  SpO2: 100% (08 Mar 2020 21:45) (99% - 100%)    ________________________________________________  PHYSICAL EXAM:  GENERAL: NAD  HEENT: Normocephalic;  conjunctivae and sclerae clear; moist mucous membranes;   NECK : supple  CHEST/LUNG: Clear to auscultation bilaterally with good air entry   HEART: S1 S2  regular; no murmurs, gallops or rubs  ABDOMEN: Soft, Nontender, Nondistended; Bowel sounds present  EXTREMITIES: no cyanosis; no edema; no calf tenderness  SKIN: warm and dry; no rash  NERVOUS SYSTEM:  Awake and alert; Oriented  to place, person and time ; no new deficits    _________________________________________________  LABS:                        8.6    5.64  )-----------( 219      ( 09 Mar 2020 07:02 )             26.3     03-09    138  |  107  |  20<H>  ----------------------------<  75  4.4   |  23  |  0.70    Ca    7.3<L>      09 Mar 2020 07:02  Phos  2.6     03-09  Mg     1.8     03-09    TPro  4.0<L>  /  Alb  1.5<L>  /  TBili  0.6  /  DBili  x   /  AST  18  /  ALT  27  /  AlkPhos  83  03-09    PT/INR - ( 09 Mar 2020 07:02 )   PT: 14.1 sec;   INR: 1.26 ratio         PTT - ( 09 Mar 2020 07:02 )  PTT:30.4 sec    CAPILLARY BLOOD GLUCOSE      POCT Blood Glucose.: 114 mg/dL (08 Mar 2020 17:13)        RADIOLOGY & ADDITIONAL TESTS:    Imaging Personally Reviewed:  YES/NO    Consultant(s) Notes Reviewed:   YES/ No    Care Discussed with Consultants :     Plan of care was discussed with patient and /or primary care giver; all questions and concerns were addressed and care was aligned with patient's wishes.

## 2020-03-09 NOTE — DISCHARGE NOTE PROVIDER - PROVIDER TOKENS
PROVIDER:[TOKEN:[[id number]],FOLLOWUP:[1-3 days]],PROVIDER:[TOKEN:[[id number]]] PROVIDER:[TOKEN:[[id number]],FOLLOWUP:[1-3 days]],PROVIDER:[TOKEN:[[id number]]],PROVIDER:[TOKEN:[[id number]]]

## 2020-03-09 NOTE — PATIENT PROFILE ADULT - NSPROEXTENSIONSOFSELF_GEN_A_NUR
cane pt a&ox3, here with c/o left ankle pain. pt states she twisted her ankle yesterday while playing soccer. mild swelling to left ankle. ROM intact, + pedal pulses.

## 2020-03-09 NOTE — CONSULT NOTE ADULT - NEGATIVE NEUROLOGICAL SYMPTOMS
no loss of consciousness/no headache/no syncope/no tremors/no difficulty walking/no vertigo/no loss of sensation

## 2020-03-09 NOTE — PROGRESS NOTE ADULT - PROBLEM SELECTOR PLAN 1
patient sent from PCP, Dr. Ross for low hemoglobin of around 7  receiving 1 unit PRBC  f/u post transfusion CBC  patient states she has dark stools but has started when she started taking iron supplements but denies any other signs of bleeding  GI Dr. Chatterjee consulted  plan for EGD   monitor CBC

## 2020-03-09 NOTE — DISCHARGE NOTE PROVIDER - HOSPITAL COURSE
Patient is a 64 year old female from home walks with cane, with PMH of recurrent SBO's, s/p exp lap, SB resection in 2015, ex lap, ALBINA in 2018, DVT, PE, on Xarelto, IVC filter, chronic leg swelling, and anasarca, came in to the ED after being sent by PCP, Dr. Ross for low hemoglobin of around 7. Based on prior admission, patient's hemoglobin seemed to be around 9. Patient had endoscopy and colonoscopy done on 1/29/20 with Dr. Hoyt stating no evidence of GI bleed. Patient denies any dizziness, lightheadedness, BRBPR, hematemesis, chest pain, SOB, nausea, vomiting, diarrhea or constipation.    Pt received 1 PRBC inED and Hb improved to 8.4. She was seen Dr hoyt who recommended Endoscopy again. Endoscopy showed [id number]    Pt would benefit from capsule endoscopy.            Given patient's improved clinical status and current hemodynamic stability, decision was made to discharge. Discussed with attending    Please refer to patient's complete medical chart with documents for a full hospital course, for this is only a brief summary. Patient is a 64 year old female from home walks with cane, with PMH of recurrent SBO's, s/p exp lap, SB resection in 2015, ex lap, ALBINA in 2018, DVT, PE, on Xarelto, IVC filter, chronic leg swelling, and anasarca, came in to the ED after being sent by PCP, Dr. Ross for low hemoglobin of around 7. Based on prior admission, patient's hemoglobin seemed to be around 9. Patient had endoscopy and colonoscopy done on 1/29/20 with Dr. Hoyt stating no evidence of GI bleed. Patient denies any dizziness, lightheadedness, BRBPR, hematemesis, chest pain, SOB, nausea, vomiting, diarrhea or constipation.    Pt received 1 PRBC inED and Hb improved to 8.4. Hb was stable throughout. CT abdomen showed rectal thickening but no active bleeding or inflammation. and after getting clearance from Dr hoyt and dr Rivera , pt is being restarted on Xarelto    Pt would benefit from GI workup outpatient            Given patient's improved clinical status and current hemodynamic stability, decision was made to discharge. Discussed with attending    Please refer to patient's complete medical chart with documents for a full hospital course, for this is only a brief summary.

## 2020-03-09 NOTE — DISCHARGE NOTE PROVIDER - CARE PROVIDERS DIRECT ADDRESSES
Patient ID checked with name and date of birth. Reviewed allergies and home medications. Took Vitals and brief medical history.        ,DirectAddress_Unknown,DirectAddress_Unknown ,DirectAddress_Unknown,DirectAddress_Unknown,DirectAddress_Unknown

## 2020-03-09 NOTE — CONSULT NOTE ADULT - SUBJECTIVE AND OBJECTIVE BOX
[  ] STAT REQUEST              [ X ] ROUTINE REQUEST    Patient is a 64 year old female with anemia. GI consulted to evaluate.        HPI:  Patient is a 64 year old female with multiple medical problems including SBO and anemia presented with worsening anemia and abdominal distention. Patient denies abdominal pain, nausea, vomiting, hematemesis, hematochezia, melena, fever, chills, chest pain, SOB, cough, palpitation, cough, hematouria or dysuria.           PAIN MANAGEMENT:  Pain Scale:                 0/10  Pain Location:      Prior Colonoscopy:  last year    PAST MEDICAL HISTORY  Anasarca  Pulmonary embolism  Constipation  DVT   SBO    HTN        PAST SURGICAL HISTORY  History of intestinal surgery      Allergies    No Known Allergies    Intolerances  None         MEDICATIONS  (STANDING):  ferrous    sulfate 325 milliGRAM(s) Oral daily  folic acid 1 milliGRAM(s) Oral daily  multivitamin 1 Tablet(s) Oral daily  pantoprazole  Injectable 40 milliGRAM(s) IV Push two times a day  senna 2 Tablet(s) Oral at bedtime         SOCIAL HISTORY  Advanced Directives:       [ X ] Full Code       [  ] DNR  Marital Status:         [  ] M      [ X ] S      [  ] D       [  ] W  Children:       [ X ] Yes      [  ] No  Occupation:        [  ] Employed       [ X ] Unemployed       [  ] Retired  Diet:       [X  ] Regular       [  ] PEG feeding          [  ] NG tube feeding  Drug Use:           [ X ] Patient denied          [  ] Yes  Alcohol:           [ X ] No             [  ] Yes (socially)         [  ] Yes (chronic)  Tobacco:           [  ] Yes           [ X ] No    FAMILY HISTORY  [ X ] Heart Disease            [  ] Diabetes             [  ] HTN             [  ] Colon Cancer             [  ] Stomach Cancer              [  ] Pancreatic Cancer    VITAL SIGNS   Vital Signs Last 24 Hrs  T(C): 36.6 (09 Mar 2020 16:06), Max: 36.9 (08 Mar 2020 19:50)  T(F): 97.8 (09 Mar 2020 16:06), Max: 98.4 (08 Mar 2020 19:50)  HR: 78 (09 Mar 2020 16:06) (75 - 88)  BP: 89/76 (09 Mar 2020 16:06) (89/76 - 103/67)  BP(mean): 81 (08 Mar 2020 18:55) (71 - 81)  RR: 16 (09 Mar 2020 16:06) (16 - 18)  SpO2: 85% (09 Mar 2020 16:06) (85% - 100%)     CBC Full  -  ( 09 Mar 2020 07:02 )  WBC Count : 5.64 K/uL  RBC Count : 2.75 M/uL  Hemoglobin : 8.6 g/dL  Hematocrit : 26.3 %  Platelet Count - Automated : 219 K/uL  Mean Cell Volume : 95.6 fl  Mean Cell Hemoglobin : 31.3 pg  Mean Cell Hemoglobin Concentration : 32.7 gm/dL  Auto Neutrophil # : x  Auto Lymphocyte # : x  Auto Monocyte # : x  Auto Eosinophil # : x  Auto Basophil # : x  Auto Neutrophil % : x  Auto Lymphocyte % : x  Auto Monocyte % : x  Auto Eosinophil % : x  Auto Basophil % : x      03-09    138  |  107  |  20<H>  ----------------------------<  75  4.4   |  23  |  0.70    Ca    7.3<L>      09 Mar 2020 07:02  Phos  2.6     03-09  Mg     1.8     03-09    TPro  4.0<L>  /  Alb  1.5<L>  /  TBili  0.6  /  DBili  x   /  AST  18  /  ALT  27  /  AlkPhos  83  03-09    PT/INR - ( 09 Mar 2020 07:02 )   PT: 14.1 sec;   INR: 1.26 ratio     PTT - ( 09 Mar 2020 07:02 )  PTT:30.4 sec    Occult Blood, Feces (01.26.20 @ 11:58)    Occult Blood, Feces: Negative    Urinalysis (08.22.19 @ 21:37)    Glucose Qualitative, Urine: Negative    Blood, Urine: Negative    pH Urine: 7.0    Color: Yellow    Urine Appearance: Clear    Bilirubin: Negative    Ketone - Urine: Small    Specific Gravity: 1.010    Protein, Urine: Negative    Urobilinogen: 1    Nitrite: Negative    Leukocyte Esterase Concentration: Trace    Iron with Total Binding Capacity (08.19.19 @ 10:15)    Iron - Total Binding Capacity.: 86 ug/dL    % Saturation, Iron: 69 %    Iron Total, Serum: 59 ug/dL    Unsaturated Iron Binding Capacity: 27 ug/dL      ECG  Ventricular Rate 93 BPM    Atrial Rate 93 BPM    P-R Interval 130 ms    QRS Duration 66 ms    Q-T Interval 344 ms    QTC Calculation(Bezet) 427 ms    P Axis 0 degrees    R Axis -11 degrees    T Axis 18 degrees    Diagnosis Line *** Poor data quality, interpretation may be adversely affected  Normal sinus rhythm  Cannot rule out Anterior infarct , age undetermined  Abnormal ECG        RADIOLOGY/IMAGING                  EXAM:  CT ABDOMEN AND PELVIS IC                            PROCEDURE DATE:  01/28/2020          INTERPRETATION:  CLINICAL INDICATION: 64 years  Female with sbo. Evaluate IVC filter    COMPARISON: 1/26/2020 and 01/24/2020    PROCEDURE:   CT of the Abdomen and Pelvis was performed with intravenous contrast.   Intravenous contrast: 90 ml Omnipaque 350. 10 ml discarded.  Oral contrast: None.  Sagittal and coronal reformats were performed.    LIMITATION: Absence of enteric contrast limits evaluationof the GI tract.    FINDINGS:    LOWER CHEST: Moderate bilateral pleural effusions with underlying compressive atelectasis of lower lobes, unchanged from 1/26/2020. Mild cardiomegaly.    LIVER: Within normal limits.  BILE DUCTS: Normal caliber.  GALLBLADDER: Distended with stones and vicarious excretion of contrast.  SPLEEN: Within normal limits.  PANCREAS: Within normal limits.  ADRENALS: Within normal limits.  KIDNEYS/URETERS: 5 mm right renal cyst versus angiomyolipoma. No hydroureteronephrosis. Symmetrical nephrograms.    BLADDER: Within normal limits.  REPRODUCTIVE ORGANS: Unremarkable uterus.    BOWEL: Previously seen small bowel obstruction has significantly improved. There is a mildly dilated loop of proximal jejunum in the upper mid abdomen measuring up to 3.6 cm in caliber (3:62 and 5:31). The remainder of the small bowel is normal in caliber however some left-sided jejunal loops are thickened. There is no evidence of small bowel pneumatosis. There is no evidence of colonic obstruction. Semisolid fecal matter is present in the left colon with air droplets in the dependent portion. The finding is likely related to the presence of semisolid fecal matter however recommend clinical correlation for pneumatosis. Appendix is not visualized and cannot be assessed. There is a radiodense tablets in the gastric antrum. The gastric wall is thickened. NG tube has been removed. Small bowel anastomosis visualized in the anterior mid abdomen with focal dilatation of bowel loops to 4.8 cm.  PERITONEUM: Moderate to marked ascites, unchanged.  VESSELS: IVC filter in situ. Visualized portions of the intra and extrahepatic portal vein and hepatic veins are patent. Below the IVC filter, the IVC is patent. The infrahepatic IVC is patent. Between the tip of the IVC and the level of the liver, the IVC is difficult to evaluate for intraluminal thrombus. Visualized portions of the SMA, celiac axis and KINA are patent.  RETROPERITONEUM/LYMPH NODES: No lymphadenopathy.    ABDOMINAL WALL: Marked anasarca, unchanged.  BONES: Mild degenerative changes.    IMPRESSION:     Absence of enteric contrast limits evaluation of the GI tract.    Previous small bowel obstruction has improved however segment of dilated small bowel are still present. Thickened small bowel loops in the left abdomen consistent with enteritis.    Gastric wall consistent with gastritis. Consider endoscopy for confirmation and to exclude underlying mass.    Semisolid fecal matter in the colon resulting in the appearance of pneumatosis, likely artifactual. Correlate clinically.    No definite venous thrombus identified. Evaluation is limited between the tip of the IVC filter and the liver.    Cholelithiasis.    Marked ascites and marked anasarca. Moderate bilateral pleural effusions with underlying compressive atelectasis unchanged.

## 2020-03-09 NOTE — PROGRESS NOTE ADULT - PROBLEM SELECTOR PLAN 2
patient on spironolactone for portal vein thrombosis and anasarca  will hold spironolactone for now due to patient having low BP  f/u abdominal x-ray for evaluation of ascites as per GI Dr. Chatterjee   monitor BP and can resume spironolactone when feasible

## 2020-03-09 NOTE — DISCHARGE NOTE PROVIDER - NSDCCPCAREPLAN_GEN_ALL_CORE_FT
PRINCIPAL DISCHARGE DIAGNOSIS  Diagnosis: Symptomatic anemia  Assessment and Plan of Treatment: You came with symptomatic anemia, Hb was low to 7 you received the blood products, blood thinners kept on hold (xarelto) your Hb stable to 8, you got EGD that was negative for any bleed, can resume the aspirin for now, but xarelto, cilastozol, plavix will kept on hold for now, you need repeat capsule endoscopy outpatient, need to f/up Dr Ross, your cardiologist next week for adjustment of meds. Take protonix and supplements with you, avoid NSAIDss.      SECONDARY DISCHARGE DIAGNOSES  Diagnosis: Pulmonary embolism  Assessment and Plan of Treatment: We are holding Xarelto due to GI bleed. Please follow up withDr sure in 1-2 days for further recommendations on anticoagulants PRINCIPAL DISCHARGE DIAGNOSIS  Diagnosis: Symptomatic anemia  Assessment and Plan of Treatment: You came with symptomatic anemia, Hb was low to 7 you received the blood products, blood thinners kept on hold (xarelto) your Hb stable to 8, you got EGD that was negative for any bleed. We have restarted your Xarelto as Hb was within normal range., , need to f/up Dr Ross, your gastroenterologist Dr Hoyt . Take protonix and supplements with you, avoid NSAIDss.      SECONDARY DISCHARGE DIAGNOSES  Diagnosis: Pulmonary embolism  Assessment and Plan of Treatment: We have restarted your anticoagulation in light of stable Hb and clearance fro King sullivan and Dr hoyt

## 2020-03-09 NOTE — DISCHARGE NOTE PROVIDER - CARE PROVIDER_API CALL
Yessica Ross)  Medicine  [address] [place] [address], [address]  [place] [address]  Phone: [phone]  Fax: [phone]  Follow Up Time: 1-3 days    Jaguar Chatterjee)  [place] [address]  Phone: [phone]  Fax: [phone]  Follow Up Time: Yessica Ross)  Medicine  [address] [place] [address], [address]  [place] [address]  Phone: [phone]  Fax: [phone]  Follow Up Time: 1-3 days    Jaguar Chatterjee)  Medicine  [address] [place] [address]  Phone: [phone]  Fax: [phone]  Follow Up Time:     Paulette Rivera)  Internal Medicine; Medical Oncology  [address] [place] [address]  Phone: [phone]  Fax: [phone]  Follow Up Time:

## 2020-03-10 NOTE — PROGRESS NOTE ADULT - PROBLEM SELECTOR PLAN 2
patient on spironolactone for portal vein thrombosis and anasarca  will hold spironolactone for now due to patient having low BP  USG abdomen limited shows no ascitis  monitor BP and can resume spironolactone when feasible

## 2020-03-10 NOTE — PROGRESS NOTE ADULT - PROBLEM SELECTOR PLAN 5
patient has history of recurrent SBO  patient currently denies any abdominal pain, nausea or vomiting  denies any diarrhea or constipation  monitor for now   f/u CT abdomen for ileus  GI Dr. Chatterjee

## 2020-03-10 NOTE — PROGRESS NOTE ADULT - SUBJECTIVE AND OBJECTIVE BOX
PGY 1 Note discussed with primary attending    Patient is a 64y old  Female who presents with a chief complaint of low hemoglobin (09 Mar 2020 17:38)      INTERVAL HPI/OVERNIGHT EVENTS:   No acute event overnight reported by overnight team and nurses. Pt remained hemodynamically stable.  Pt seen and examined  at bed side. All concerns and questions answered.   Report no new complaint. Pt denies any fever, nausea, vomiting.  Explained all test results and plan.  Appreciate all consults.     MEDICATIONS  (STANDING):  ferrous    sulfate 325 milliGRAM(s) Oral daily  folic acid 1 milliGRAM(s) Oral daily  multivitamin 1 Tablet(s) Oral daily  pantoprazole  Injectable 40 milliGRAM(s) IV Push two times a day  senna 2 Tablet(s) Oral at bedtime    MEDICATIONS  (PRN):      __________________________________________________  REVIEW OF SYSTEMS:    CONSTITUTIONAL: No fever,   EYES: no acute visual disturbances  NECK: No pain or stiffness  RESPIRATORY: No cough; No shortness of breath  CARDIOVASCULAR: No chest pain, no palpitations  GASTROINTESTINAL: No pain. No nausea or vomiting; No diarrhea   NEUROLOGICAL: No headache or numbness, no tremors  MUSCULOSKELETAL: No joint pain, no muscle pain  GENITOURINARY: no dysuria, no frequency, no hesitancy  PSYCHIATRY: no depression , no anxiety  ALL OTHER  ROS negative        Vital Signs Last 24 Hrs  T(C): 36.2 (10 Mar 2020 08:07), Max: 36.6 (09 Mar 2020 16:06)  T(F): 97.2 (10 Mar 2020 08:07), Max: 97.8 (09 Mar 2020 16:06)  HR: 68 (10 Mar 2020 08:07) (65 - 92)  BP: 90/59 (10 Mar 2020 08:07) (80/45 - 108/77)  BP(mean): --  RR: 17 (10 Mar 2020 08:07) (16 - 19)  SpO2: 100% (10 Mar 2020 08:07) (85% - 100%)    ________________________________________________  PHYSICAL EXAM:  GENERAL: NAD  HEENT: Normocephalic;  conjunctivae and sclerae clear; moist mucous membranes;   NECK : supple  CHEST/LUNG: Clear to auscultation bilaterally with good air entry   HEART: S1 S2  regular; no murmurs, gallops or rubs  ABDOMEN: Soft, Nontender, Nondistended; Bowel sounds present  EXTREMITIES: no cyanosis; no edema; no calf tenderness  SKIN: warm and dry; no rash , old scars of surgery on abdomen  NERVOUS SYSTEM:  Awake and alert; Oriented  to place, person and time ; no new deficits    _________________________________________________  LABS:                        9.3    5.81  )-----------( 245      ( 10 Mar 2020 10:07 )             29.9     03-10    141  |  112<H>  |  17  ----------------------------<  98  3.9   |  21<L>  |  0.76    Ca    6.6<L>      10 Mar 2020 10:07  Phos  2.6     03-09  Mg     1.8     03-09    TPro  4.0<L>  /  Alb  1.5<L>  /  TBili  0.6  /  DBili  x   /  AST  18  /  ALT  27  /  AlkPhos  83  03-09    PT/INR - ( 09 Mar 2020 07:02 )   PT: 14.1 sec;   INR: 1.26 ratio         PTT - ( 09 Mar 2020 07:02 )  PTT:30.4 sec    CAPILLARY BLOOD GLUCOSE            RADIOLOGY & ADDITIONAL TESTS:    Imaging Personally Reviewed:  YES    Consultant(s) Notes Reviewed:   YES    Care Discussed with Consultants :     Plan of care was discussed with patient and /or primary care giver; all questions and concerns were addressed and care was aligned with patient's wishes.

## 2020-03-10 NOTE — PROGRESS NOTE ADULT - PROBLEM SELECTOR PLAN 1
patient sent from PCP, Dr. Ross for low hemoglobin of around 7  receiving 1 unit PRBC  Hb 9.3 on 03/10  patient states she has dark stools but has started when she started taking iron supplements but denies any other signs of bleeding  GI Dr. Chatterjee consulted, He recommends CT abdomen to rule out ileus  No EG plan for now

## 2020-03-11 NOTE — PROGRESS NOTE ADULT - SUBJECTIVE AND OBJECTIVE BOX
Patient was seen and examined  Patient is a 64y old  Female who presents with a chief complaint of low hemoglobin (10 Mar 2020 10:32)      INTERVAL HPI/OVERNIGHT EVENTS:  T(C): 36.8 (03-11-20 @ 08:18), Max: 36.8 (03-11-20 @ 08:18)  HR: 67 (03-11-20 @ 08:18) (67 - 86)  BP: 90/60 (03-11-20 @ 08:18) (85/51 - 100/68)  RR: 18 (03-11-20 @ 08:18) (17 - 18)  SpO2: 100% (03-11-20 @ 08:18) (96% - 100%)  Wt(kg): --  I&O's Summary      LABS:                        8.3    5.93  )-----------( 165      ( 11 Mar 2020 06:41 )             25.8     03-10    141  |  112<H>  |  17  ----------------------------<  98  3.9   |  21<L>  |  0.76    Ca    6.6<L>      10 Mar 2020 10:07          CAPILLARY BLOOD GLUCOSE        LIPID PANEL  Cholesterol 56  LDL 18  HDL 26  RATIO HDL/Total Cholesterol --  Triglyceride 60            MEDICATIONS  (STANDING):  ferrous    sulfate 325 milliGRAM(s) Oral daily  folic acid 1 milliGRAM(s) Oral daily  multivitamin 1 Tablet(s) Oral daily  pantoprazole  Injectable 40 milliGRAM(s) IV Push two times a day  senna 2 Tablet(s) Oral at bedtime    MEDICATIONS  (PRN):      RADIOLOGY & ADDITIONAL TESTS:    Imaging Personally Reviewed:  [ ] YES  [ ] NO    REVIEW OF SYSTEMS:  CONSTITUTIONAL: No fever, weight loss, or fatigue  EYES: No eye pain, visual disturbances, or discharge  ENMT:  No difficulty hearing, tinnitus, vertigo; No sinus or throat pain  NECK: No pain or stiffness  BREASTS: No pain, masses, or nipple discharge  RESPIRATORY: No cough, wheezing, chills or hemoptysis; No shortness of breath  CARDIOVASCULAR: No chest pain, palpitations, dizziness, or leg swelling  GASTROINTESTINAL: No abdominal or epigastric pain. No nausea, vomiting, or hematemesis; No diarrhea or constipation. No melena or hematochezia.  GENITOURINARY: No dysuria, frequency, hematuria, or incontinence  NEUROLOGICAL: No headaches, memory loss, loss of strength, numbness, or tremors  SKIN: No itching, burning, rashes, or lesions   LYMPH NODES: No enlarged glands  ENDOCRINE: No heat or cold intolerance; No hair loss  MUSCULOSKELETAL: No joint pain or swelling; No muscle, back, or extremity pain  PSYCHIATRIC: No depression, anxiety, mood swings, or difficulty sleeping  HEME/LYMPH: No easy bruising, or bleeding gums  ALLERY AND IMMUNOLOGIC: No hives or eczema      Consultant(s) Notes Reviewed:  [ x ] YES  [ ] NO    PHYSICAL EXAM:  GENERAL: NAD, well-groomed, well-developed  HEAD:  Atraumatic, Normocephalic  EYES: EOMI, PERRLA, conjunctiva and sclera clear  ENMT: No tonsillar erythema, exudates, or enlargement; Moist mucous membranes, Good dentition, No lesions  NECK: Supple, No JVD, Normal thyroid  NERVOUS SYSTEM:  Alert & Oriented X3, Good concentration; Motor Strength 5/5 B/L upper and lower extremities; DTRs 2+ intact and symmetric  CHEST/LUNG: Clear to percussion bilaterally; No rales, rhonchi, wheezing, or rubs  HEART: Regular rate and rhythm; No murmurs, rubs, or gallops  ABDOMEN: Soft, Nontender, Nondistended; Bowel sounds present  EXTREMITIES:  2+ Peripheral Pulses, No clubbing, cyanosis, or edema  LYMPH: No lymphadenopathy noted  SKIN: No rashes or lesions    Care Discussed with Consultants/Other Providers [ x] YES  [ ] NO

## 2020-03-11 NOTE — DISCHARGE NOTE NURSING/CASE MANAGEMENT/SOCIAL WORK - PATIENT PORTAL LINK FT
You can access the FollowMyHealth Patient Portal offered by [facility] by registering at the following website: [email or web address]. By joining [facility]’s FollowMyHealth portal, you will also be able to view your health information using other applications (apps) compatible with our system.

## 2020-03-11 NOTE — PROGRESS NOTE ADULT - ASSESSMENT
Patient is a 64 year old female from home walks with cane, with PMH of recurrent SBO's, s/p exp lap, SB resection in 2015, ex lap, ALBINA in 2018, DVT, PE, on xarelto, IVC filter, chronic leg swelling, and anasarca, who comes in to the ED after being sent by PCP, Dr. Ross for low hemoglobin of around 7.    Given 1L NS bolus in ED.  Receiving 1 unit PRBC in ED.     Admitted for anemia.   DC PLANING

## 2020-03-11 NOTE — CONSULT NOTE ADULT - PROBLEM SELECTOR RECOMMENDATION 9
she has difficulty absorbing iron due to SB resection.  The ferritin is ok now after IV venofer.  she may have bleeding even GI w/u is neg.  s/p transfusion.

## 2020-03-11 NOTE — CONSULT NOTE ADULT - ASSESSMENT
64 year old lady with multiple SB rsection for SBO/  She has anemia but ferritin was normal.  GI w/u neg for bleeding.  She had h/o DVT and PE on xarelto.  The left DVT recurred after holding xarelto.
1. Anemia  2. No evidence of acute GI bleeding  3. Distended abdomen      - R/o Colonic ileus  Suggestions:    1. CT-Scan of abdomen and pelvis  2. Monitor H/H  3. Transfuse PRBC as needed  3. Hematology evaluation  4. Protonix daily  5. Monitor electrolytes  6. Obtain colonoscopy and EGD reports  7. DVT prophylaxis

## 2020-03-11 NOTE — CONSULT NOTE ADULT - PROBLEM SELECTOR RECOMMENDATION 2
new DVT developed soon after holding xarelto.  she still has DVT at left leg from before.  she needs to continue xarelto at 15 mg a day.

## 2020-04-20 NOTE — PROGRESS NOTE ADULT - ATTENDING COMMENTS
Called pt to remind of his appt tomorrow and remind him of his paperwork he should have received Via Email - he states that his referring provider that he had a vv with not to long ago advised him to do an In person visit for his first visit here  Patient is scheduled for a VV tomorrow has temporarily moved to [place] and might not be able to come to an in person visit tomorrow   He also states he needs to do a new round of labs because he possibly might have been accidentally Diagnosed with this disease in error The questionable bowel has resolved, the patient is doing well, tolerating regular diet and having bowel function.   There is no evidence of short bowel syndrome - based on old operative reports and CT, the patient hasn't had massive small bowel resection.  I believe the hypoalbuminemia, ascites, chronic GI blood loss and bowel edema that was concerning for SBO is all related to portal hypertension due retro-hepatic IVC thrombosis.     Plan:  Would resume anticoagulation  Case d/w IR - they recommend outpatient follow up with IR at [facility] to discuss a venogram ?stent or TIPS The questionable bowel obstruction has resolved, the patient is doing well, tolerating regular diet and having bowel function.   There is no evidence of short bowel syndrome - based on old operative reports and CT, the patient hasn't had massive small bowel resection.  I believe the hypoalbuminemia, ascites, chronic GI blood loss and bowel edema that was concerning for SBO is all related to portal hypertension due retro-hepatic IVC thrombosis.     Plan:  Would resume anticoagulation  Case d/w IR - they recommend outpatient follow up with IR at [facility] ([phone]) to discuss a venogram ?stent or even TIPS

## 2020-05-18 NOTE — H&P ADULT - NSHPPHYSICALEXAM_GEN_ALL_CORE
Vital Signs Last 24 Hrs  T(C): 36.6 (19 May 2020 03:50), Max: 36.6 (18 May 2020 17:44)  T(F): 97.8 (19 May 2020 03:50), Max: 97.9 (18 May 2020 17:44)  HR: 95 (19 May 2020 03:50) (68 - 100)  BP: 116/70 (19 May 2020 03:50) (94/68 - 116/70)  BP(mean): --  RR: 18 (19 May 2020 03:50) (16 - 18)  SpO2: 97% (19 May 2020 03:50) (97% - 98%)

## 2020-05-18 NOTE — H&P ADULT - PROBLEM SELECTOR PLAN 3
CT abdomen showed colitis and concern for SBO.  Will start on IV cipro and IV flagyl.   GI consult Dr Felix.

## 2020-05-18 NOTE — ED PROVIDER NOTE - PHYSICAL EXAMINATION
GENERAL: well appearing, no acute distress   HEAD: atraumatic   EYES: pale conjunctiva   ENT: moist oral mucosa   CARDIAC: RRR, no edema, distal pulses present   RESPIRATORY: lungs CTAB, no increased work of breathing   GASTROINTESTINAL: no abdominal tenderness, no rebound or guarding, bowel sounds presents  GENITOURINARY: no CVA tenderness   MUSCULOSKELETAL: no deformity   NEUROLOGICAL: AAOx3, CN's II-XII intact, strength 5/5 bilateral UE and LE, sensation intact to light touch, finger to nose intact  SKIN: intact   PSYCHIATRIC: cooperative  HEME LYMPH: no lymphadenopathy

## 2020-05-18 NOTE — H&P ADULT - ASSESSMENT
64 year old female from home walks with cane, with PMH of recurrent SBO's, s/p exp lap, SB resection in 2015, ex lap, ALBINA in 2018, DVT, PE, on Xarelto, IVC filter, chronic leg swelling, and anasarca, came in to the ED after being sent by PCP, Dr. Rsos for generalized weakness and increased in abdominal girth for last few days. Patient reports the abdominal girth is bothering her and she has noticed increased swelling in her belly and her legs.   Patient denies any any chest pain, palpitations abdominal pain, nausea, vomiting, change in urinary or bowel habits.  Patient is being admitted with anasarca with increased asci ties along with severe hypoalbuminemia. 1 dose of albumin given. Will start on IV lasix 20 mg once daily. CT abdomen showed colitis and concern for SBO. Will start on IV cipro and IV flagyl. NPO for now. Surgery evaluation in am. GI consult Dr Felix.

## 2020-05-18 NOTE — H&P ADULT - PROBLEM SELECTOR PLAN 2
CT abdomen showed colitis and concern for SBO.   Will start on IV cipro and IV flagyl.   NPO for now. Surgery evaluation in am.   GI consult Dr Felix.

## 2020-05-18 NOTE — H&P ADULT - ATTENDING COMMENTS
Vital Signs Last 24 Hrs  T(C): 36.4 (18 May 2020 20:25), Max: 36.6 (18 May 2020 17:44)  T(F): 97.5 (18 May 2020 20:25), Max: 97.9 (18 May 2020 17:44)  HR: 95 (18 May 2020 20:25) (68 - 95)  BP: 108/72 (18 May 2020 20:25) (94/68 - 108/72)  BP(mean): --  RR: 17 (18 May 2020 20:25) (16 - 17)  SpO2: 98% (18 May 2020 20:25) (98% - 98%) Patient seen and examined ; case was discussed with the admitting resident    ROS: as in the HPI; all other ROS negative    SH and family history as above    Vital Signs Last 24 Hrs  T(C): 36.3 (19 May 2020 00:28), Max: 36.6 (18 May 2020 17:44)  T(F): 97.4 (19 May 2020 00:28), Max: 97.9 (18 May 2020 17:44)  HR: 100 (19 May 2020 00:28) (68 - 100)  BP: 114/74 (19 May 2020 00:28) (94/68 - 114/74)  BP(mean): --  RR: 17 (19 May 2020 00:28) (16 - 17)  SpO2: 98% (19 May 2020 00:28) (98% - 98%)    GEN: NAD  HEENT- normocephalic; mouth moist, bitemporal wasting   CVS- prominent S1, +S2, tachycardia   LUNGS- decreased breath sounds   ABD: Soft , nontender +distended  EXTREMITY: no calf tenderness, no cyanosis, +edema UE/L BL   NEURO: AAOx3; non focal neurologic exam; cranial nerves grossly intact  PSYCH: normal affect and behavior  BACK: no swelling or mass;   VASCULAR: ++ distal peripheral pulses  SKIN: warm and dry, scaling b/l feet       Labs Reviewed:                         9.0    9.62  )-----------( 250      ( 18 May 2020 20:47 )             27.7     05-18    140  |  109<H>  |  13  ----------------------------<  111<H>  3.5   |  25  |  0.67    Ca    6.0<LL>      18 May 2020 20:47  Mg     1.5         TPro  3.6<L>  /  Alb  0.8<L>  /  TBili  1.0  /  DBili  x   /  AST  52<H>  /  ALT  64<H>  /  AlkPhos  165<H>  0518    CARDIAC MARKERS ( 18 May 2020 20:47 )  <0.015 ng/mL / x     / x     / x     / x      CARDIAC MARKERS ( 18 May 2020 20:07 )  qns ng/mL / x     / x     / x     / x          Urinalysis Basic - ( 18 May 2020 21:53 )    Color: Yellow / Appearance: Clear / S.010 / pH: x  Gluc: x / Ketone: Negative  / Bili: Negative / Urobili: Negative   Blood: x / Protein: Negative / Nitrite: Negative   Leuk Esterase: Small / RBC: 0-2 /HPF / WBC 6-10 /HPF   Sq Epi: x / Non Sq Epi: Few /HPF / Bacteria: Few /HPF      PT/INR - ( 18 May 2020 20:47 )   PT: 33.0 sec;   INR: 2.83 ratio         PTT - ( 18 May 2020 20:47 )  PTT:42.5 sec  BNP:   MEDICATIONS  (STANDING):  furosemide   Injectable 20 milliGRAM(s) IV Push daily  rivaroxaban 20 milliGRAM(s) Oral with dinner  CXR reviewed  EKG Reviewed- low voltage   Prev hospitalizations reviewed     65 y/o F with h/o DVT/PE s/p IVC filter, recurrent SBOs, anasarca with hypoalbuminemia, likely portal; hypertension with 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 admitted with diffuse weakness and worsening swelling.     1. Anasarca with ascites- Albumin with lasix given, appreciate GI consult- Dr. Felix requested per patient's PMD  2. Enterocolitis- empiric cipro and flagyl   3. Ilieus vs partial SBO- consult general surgery in AM, NPO for now, likely some chronicity to findings given hx and no findings of ischemia   4. DVT/PE s/p IVC filter- prev vascular consult in 2020, needs to f/u once acute issues stabilize   5. Elevated hepatic enzymes- hepatic and portal vein patent on CT abdomen, discussed with radiology.   6. Hypomagnesemia- replace and recheck   7. Anemia- denies overt blood loss, previous evaluation by heme noted.   8. Chronic B/l pleural effusions- cont IV lasix as above   9. Hypocalcemia- corrected for hypoalbuminemia   10. Severe protein calorie malnutrition   Plan of care discussed with patient ;  all questions and concerns were addressed.

## 2020-05-18 NOTE — H&P ADULT - HISTORY OF PRESENT ILLNESS
64 year old female from home walks with cane, with PMH of recurrent SBO's, s/p exp lap, SB resection in 2015, ex lap, ALBINA in 2018, DVT, PE, on Xarelto, IVC filter, chronic leg swelling, and anasarca, came in to the ED after being sent by PCP, Dr. Ross for generalized weakness and increased in abdominal girth for last few days. Patient reports the abdominal girth is bothering her and she has noticed increased swelling in her belly and her legs.   Patient denies any any chest pain, palpitations abdominal pain, nausea, vomiting, change in urinary or bowel habits.  Patient is being admitted with anasarca with increased asci ties along with severe hypoalbuminemia. 1 dose of albumin given. Will start on IV lasix 20 mg once daily. CT abdomen showed colitis and concern for SBO. Will start on IV cipro and IV flagyl. NPO for now. Surgery evaluation in am. GI consult Dr Felix.

## 2020-05-18 NOTE — H&P ADULT - PROBLEM SELECTOR PLAN 5
Patient has history of iron deficiency anemia.  GI workup negative 2 months ago.  will check iron panel.  H and H is at her baseline

## 2020-05-18 NOTE — ED PROVIDER NOTE - PROGRESS NOTE DETAILS
EKG - nsr, low voltage, no ectopy EKG - nsr, low voltage, no ectopy  labs - Hgb 9, INR 2.3, Ca 6, Mg 1.5  Dr Ross requests admission to his service but hospitalist (Dr Mercado) will do admission. Endorsed to MAR.

## 2020-05-18 NOTE — H&P ADULT - PROBLEM SELECTOR PLAN 1
Patient is being admitted with anasarca with increased ascities along with severe hypoalbuminemia.   1 dose of albumin given.   Will start on IV lasix 20 mg once daily.  Patient will need IR guided ascitic tap.

## 2020-05-18 NOTE — ED ADULT NURSE NOTE - CHPI ED NUR SYMPTOMS NEG
no nausea/no numbness/no weakness/no loss of consciousness/no blurred vision/no confusion/no vomiting/no dizziness/no fever/no change in level of consciousness

## 2020-05-19 PROBLEM — R60.1 GENERALIZED EDEMA: Chronic | Status: ACTIVE | Noted: 2020-01-01

## 2020-05-19 NOTE — CONSULT NOTE ADULT - SUBJECTIVE AND OBJECTIVE BOX
64 year old female from home walks with cane, with PMH of recurrent SBO's, s/p exp lap, SB resection in 2015, ex lap, ALBINA in 2018, DVT, PE, on Xarelto, IVC filter, chronic leg swelling, and anasarca, came in to the ED after being sent by PCP, Dr. Ross for generalized weakness and increased in abdominal girth for last few days. Pt stated that her LE and abdomen has been swollen for a few days, now notices a decrease. Has not had any history of liver disease previously. Stated she had a colonoscopy done in 2018 however it was not done successfully she does not remember why. Pt has a BM daily, denies any abd pain, N/V/D, constipation, rectal bleed, melena.   Called to evaluate for CT suggestion of psbo. pt deneis abd pain. denies n/v. Last bm this morning, was normal per pt.      PMH:   Anasarca     DVT (deep venous thrombosis)     Pulmonary embolism     SBO (small bowel obstruction).     PSH:   H/O exploratory laparotomy.     Meds:  MEDICATIONS  (STANDING):  ciprofloxacin   IVPB 400 milliGRAM(s) IV Intermittent every 12 hours  ciprofloxacin   IVPB      furosemide   Injectable 20 milliGRAM(s) IV Push daily  metroNIDAZOLE    Tablet 500 milliGRAM(s) Oral every 8 hours  pantoprazole    Tablet 40 milliGRAM(s) Oral before breakfast  potassium chloride  10 mEq/100 mL IVPB 10 milliEquivalent(s) IV Intermittent every 1 hour  potassium phosphate IVPB 15 milliMole(s) IV Intermittent once     SH: non-contributory   FH: non-contributory  ROS:  CONSTITUTIONAL: No fever, weight loss, or fatigue  EYES: No eye pain, visual disturbances, or discharge  ENT:  No difficulty hearing, tinnitus, vertigo; No sinus or throat pain  NECK: No pain or stiffness  RESPIRATORY: No cough, wheezing, chills or hemoptysis, shortness of Breath  CARDIOVASCULAR: No chest pain, palpitations, passing out, dizziness, or leg swelling  GASTROINTESTINAL: see HPI  GENITOURINARY: No dysuria, frequency, hematuria, or incontinence  NEUROLOGICAL: No headaches, memory loss, loss of strength, numbness, or tremors  SKIN: No itching, burning, rashes, or lesions   MUSCULOSKELETAL: No arthralgia, myalgia, or back pain.     Vitals:   Vital Signs Last 24 Hrs  T(C): 36.7 (19 May 2020 09:54), Max: 36.7 (19 May 2020 09:54)  T(F): 98 (19 May 2020 09:54), Max: 98 (19 May 2020 09:54)  HR: 91 (19 May 2020 09:54) (68 - 100)  BP: 90/40 (19 May 2020 09:54) (90/40 - 116/70)    Gen: NAD  CVS: S1/S2  Chest: CTABL  Abd: soft, non tender, mild distended , +bs                          8.9    7.25  )-----------( 252      ( 19 May 2020 05:55 )             27.8   05-19    140  |  107  |  12  ----------------------------<  97  3.3<L>   |  26  |  0.64    Ca    6.2<LL>      19 May 2020 05:55  Phos  1.3     05-19  Mg     1.8     05-19    TPro  3.7<L>  /  Alb  x   /  TBili  x   /  DBili  x   /  AST  x   /  ALT  x   /  AlkPhos  x   05-19          Imaging:   EXAM:  CT ABDOMEN AND PELVIS IC                          INTERPRETATION:  CT ABDOMEN AND PELVIS WITH CONTRAST    INDICATION: Abdominal swelling. Increased abdominal girth.    TECHNIQUE: Contrast enhanced CT of the abdomen and pelvis. Images are reformatted in the sagittal and coronal planes.    COMPARISON: CT abdomen pelvis 3/10/2020.    FINDINGS:    Lower Thorax: Small bilateral pleural effusion with adjacent bibasilar opacities, likely represent dependent atelectasis. Recommend clinical correlation to assess for superimposed infection.    Liver: Steatosis.  Biliary: No significant dilatation. Cholelithiasis.  Spleen: No suspicious lesions.  Pancreas: No inflammatory changes or ductal dilatation.  Adrenals: Normal.  Kidneys: No hydronephrosis. 5 mm right renal angiomyolipoma.  Vessels: Mild atherosclerotic disease of the aorta and its branches. Celiac, SMA and KINA axes appears patent. Stable appearance of IVC filter.    GI tract: There is multisegmental wall thickening of small and large bowel loops with hyperemia, most pronounced at the level of jejunal ileal folds, concerning for enterocolitis, likely infectious or inflammatory nature. Small bowel anastomosis revisualized in the anterior mid abdomen with focal dilatation of bowel loops to 4.3 cm. Additional focally dilated fecalized jejunal loop measuring up to 4 cm in the left side of abdomen with suggestion of transition point in the left upper abdomen near surgical anastomosis images 62 through 66 of series 2 raising possibility of ileus or at least partial small bowel obstruction.    Peritoneum/retroperitoneum and mesentery: Large ascites, increased compared to the prior study. No free air. No organized fluid collection. No bulky adenopathy.    Pelvic organs/Bladder: No pelvic masses. Bladder is normal.    Abdominal wall: Diffuse anasarca.  Bones and soft tissues: Multilevel degenerative changes of the spine are noted with patchy osteopenia, limiting detailed evaluation.     IMPRESSION:    Multisegmental wall thickening of small and large bowel loops with hyperemia, most pronounced at the level of jejunal ileal folds, concerning for enterocolitis, likely infectious or inflammatory nature.     Small bowel anastomosis revisualized in the anterior mid abdomen with focal dilatation of bowel loops to 4.3 cm. Additional focally dilated fecalized jejunal loop measuring up to 4 cm in the left side of abdomen with suggestion of transition point in the left upper abdomen near surgical anastomosis, raising possibility of ileus or at least partial small bowel obstruction. No pneumatosis or findings of portal venous gas to suggest bowel ischemia. Correlate with lactic acid.    Large ascites, increased compared to the prior study.    Cholelithiasis.    Small bilateral pleural effusion with adjacent bibasilar opacities, likely represent dependent atelectasis. Recommend clinical correlation to assess for superimposed infection.

## 2020-05-19 NOTE — CONSULT NOTE ADULT - ASSESSMENT
65 YO F with anasarca and possible colitis seen on CT  no evidence of sbo clinically  benign abd exam  Ascities    GI following,  Paracentisis was recommended and hepatitis workup  will follow after above as needed  no surgical intervention warranted presently

## 2020-05-19 NOTE — CONSULT NOTE ADULT - SUBJECTIVE AND OBJECTIVE BOX
GI INITIAL CONSULT      HPI:  64 year old female from home walks with cane, with PMH of recurrent SBO's, s/p exp lap, SB resection in 2015, ex lap, ALBINA in 2018, DVT, PE, on Xarelto, IVC filter, chronic leg swelling, and anasarca, came in to the ED after being sent by PCP, Dr. Ross for generalized weakness and increased in abdominal girth for last few days. Pt stated that her LE and abdomen has been swollen for a few days, now notices a decrease. Has not had any history of liver disease previously. Stated she had a colonoscopy done in 2018 however it was not done successfully she does not remember why. Pt has a BM daily, denies any abd pain, N/V/D, constipation, rectal bleed, melena.     PMH:   Anasarca     DVT (deep venous thrombosis)     Pulmonary embolism     SBO (small bowel obstruction).     PSH:   H/O exploratory laparotomy.     Meds:  MEDICATIONS  (STANDING):  ciprofloxacin   IVPB 400 milliGRAM(s) IV Intermittent every 12 hours  ciprofloxacin   IVPB      furosemide   Injectable 20 milliGRAM(s) IV Push daily  metroNIDAZOLE    Tablet 500 milliGRAM(s) Oral every 8 hours  pantoprazole    Tablet 40 milliGRAM(s) Oral before breakfast  potassium chloride  10 mEq/100 mL IVPB 10 milliEquivalent(s) IV Intermittent every 1 hour  potassium phosphate IVPB 15 milliMole(s) IV Intermittent once     SH: non-contributory   FH: non-contributory  ROS:  CONSTITUTIONAL: No fever, weight loss, or fatigue  EYES: No eye pain, visual disturbances, or discharge  ENT:  No difficulty hearing, tinnitus, vertigo; No sinus or throat pain  NECK: No pain or stiffness  RESPIRATORY: No cough, wheezing, chills or hemoptysis, shortness of Breath  CARDIOVASCULAR: No chest pain, palpitations, passing out, dizziness, or leg swelling  GASTROINTESTINAL: see HPI  GENITOURINARY: No dysuria, frequency, hematuria, or incontinence  NEUROLOGICAL: No headaches, memory loss, loss of strength, numbness, or tremors  SKIN: No itching, burning, rashes, or lesions   MUSCULOSKELETAL: No arthralgia, myalgia, or back pain.     Vitals:   Vital Signs Last 24 Hrs  T(C): 36.7 (19 May 2020 09:54), Max: 36.7 (19 May 2020 09:54)  T(F): 98 (19 May 2020 09:54), Max: 98 (19 May 2020 09:54)  HR: 91 (19 May 2020 09:54) (68 - 100)  BP: 90/40 (19 May 2020 09:54) (90/40 - 116/70)    Gen: NAD  CVS: S1/S2  Chest: CTABL  Abd: soft, non tender, distended                           8.9    7.25  )-----------( 252      ( 19 May 2020 05:55 )             27.8   05-19    140  |  107  |  12  ----------------------------<  97  3.3<L>   |  26  |  0.64    Ca    6.2<LL>      19 May 2020 05:55  Phos  1.3     05-19  Mg     1.8     05-19    TPro  3.7<L>  /  Alb  x   /  TBili  x   /  DBili  x   /  AST  x   /  ALT  x   /  AlkPhos  x   05-19      Imaging:   EXAM:  CT ABDOMEN AND PELVIS IC                          INTERPRETATION:  CT ABDOMEN AND PELVIS WITH CONTRAST    INDICATION: Abdominal swelling. Increased abdominal girth.    TECHNIQUE: Contrast enhanced CT of the abdomen and pelvis. Images are reformatted in the sagittal and coronal planes.    COMPARISON: CT abdomen pelvis 3/10/2020.    FINDINGS:    Lower Thorax: Small bilateral pleural effusion with adjacent bibasilar opacities, likely represent dependent atelectasis. Recommend clinical correlation to assess for superimposed infection.    Liver: Steatosis.  Biliary: No significant dilatation. Cholelithiasis.  Spleen: No suspicious lesions.  Pancreas: No inflammatory changes or ductal dilatation.  Adrenals: Normal.  Kidneys: No hydronephrosis. 5 mm right renal angiomyolipoma.  Vessels: Mild atherosclerotic disease of the aorta and its branches. Celiac, SMA and KINA axes appears patent. Stable appearance of IVC filter.    GI tract: There is multisegmental wall thickening of small and large bowel loops with hyperemia, most pronounced at the level of jejunal ileal folds, concerning for enterocolitis, likely infectious or inflammatory nature. Small bowel anastomosis revisualized in the anterior mid abdomen with focal dilatation of bowel loops to 4.3 cm. Additional focally dilated fecalized jejunal loop measuring up to 4 cm in the left side of abdomen with suggestion of transition point in the left upper abdomen near surgical anastomosis images 62 through 66 of series 2 raising possibility of ileus or at least partial small bowel obstruction.    Peritoneum/retroperitoneum and mesentery: Large ascites, increased compared to the prior study. No free air. No organized fluid collection. No bulky adenopathy.    Pelvic organs/Bladder: No pelvic masses. Bladder is normal.    Abdominal wall: Diffuse anasarca.  Bones and soft tissues: Multilevel degenerative changes of the spine are noted with patchy osteopenia, limiting detailed evaluation.     IMPRESSION:    Multisegmental wall thickening of small and large bowel loops with hyperemia, most pronounced at the level of jejunal ileal folds, concerning for enterocolitis, likely infectious or inflammatory nature.     Small bowel anastomosis revisualized in the anterior mid abdomen with focal dilatation of bowel loops to 4.3 cm. Additional focally dilated fecalized jejunal loop measuring up to 4 cm in the left side of abdomen with suggestion of transition point in the left upper abdomen near surgical anastomosis, raising possibility of ileus or at least partial small bowel obstruction. No pneumatosis or findings of portal venous gas to suggest bowel ischemia. Correlate with lactic acid.    Large ascites, increased compared to the prior study.    Cholelithiasis.    Small bilateral pleural effusion with adjacent bibasilar opacities, likely represent dependent atelectasis. Recommend clinical correlation to assess for superimposed infection.

## 2020-05-19 NOTE — PROGRESS NOTE ADULT - PROBLEM SELECTOR PLAN 5
came in with albumin of 0.8   1 dose of albumin given.  f/u Albumin level 1.2   monitor albumin level

## 2020-05-19 NOTE — PROGRESS NOTE ADULT - PROBLEM SELECTOR PLAN 1
Patient is being admitted with anasarca with increased ascities along with severe hypoalbuminemia.   c/w IV lasix 20 mg once daily.

## 2020-05-19 NOTE — PROGRESS NOTE ADULT - SUBJECTIVE AND OBJECTIVE BOX
NP Note discussed with  Primary Attending    Patient is a 64y old  Female who presents with a chief complaint of Swelling, weakness (19 May 2020 08:55)    HPI - 64 year old female from home walks with cane, with PMH of recurrent SBO's, s/p exp lap, SB resection in , ex lap, ALBINA in 2018, DVT, PE, on Xarelto, IVC filter, chronic leg swelling, and anasarca, came in to the ED after being sent by PCP, Dr. Ross for generalized weakness and increased in abdominal girth for last few days. Patient reports the abdominal girth is bothering her and she has noticed increased swelling in her belly and her legs.   Patient denies any any chest pain, palpitations abdominal pain, nausea, vomiting, change in urinary or bowel habits.  Patient is being admitted with anasarca with increased asci ties along with severe hypoalbuminemia. 1 dose of albumin given. Will start on IV lasix 20 mg once daily. CT abdomen showed colitis and concern for SBO. Will start on IV cipro and IV flagyl. NPO for now. Surgery evaluation in am. GI consult Dr Felix.    seen ad examined pt at bedside, stable,  reports feeling weakness but breathing better now.       INTERVAL HPI/OVERNIGHT EVENTS: no new complaints    MEDICATIONS  (STANDING):  ciprofloxacin   IVPB 400 milliGRAM(s) IV Intermittent every 12 hours  ciprofloxacin   IVPB      furosemide   Injectable 20 milliGRAM(s) IV Push daily  metroNIDAZOLE    Tablet 500 milliGRAM(s) Oral every 8 hours  pantoprazole    Tablet 40 milliGRAM(s) Oral before breakfast  potassium chloride  10 mEq/100 mL IVPB 10 milliEquivalent(s) IV Intermittent every 1 hour  potassium phosphate IVPB 15 milliMole(s) IV Intermittent once  rivaroxaban 20 milliGRAM(s) Oral with dinner    MEDICATIONS  (PRN):      __________________________________________________  REVIEW OF SYSTEMS:    CONSTITUTIONAL: No fever,   EYES: no acute visual disturbances  NECK: No pain or stiffness  RESPIRATORY: No cough; No shortness of breath  CARDIOVASCULAR: No chest pain, no palpitations  GASTROINTESTINAL: No pain. No nausea or vomiting; No diarrhea   NEUROLOGICAL: No headache or numbness, no tremors  MUSCULOSKELETAL: No joint pain, no muscle pain  GENITOURINARY: no dysuria, no frequency, no hesitancy  PSYCHIATRY: no depression , no anxiety  ALL OTHER  ROS negative        Vital Signs Last 24 Hrs  T(C): 36.6 (19 May 2020 08:42), Max: 36.6 (18 May 2020 17:44)  T(F): 97.9 (19 May 2020 08:42), Max: 97.9 (18 May 2020 17:44)  HR: 98 (19 May 2020 08:42) (68 - 100)  BP: 98/67 (19 May 2020 08:42) (91/67 - 116/70)  BP(mean): --  RR: 19 (19 May 2020 08:42) (16 - 19)  SpO2: 99% (19 May 2020 08:42) (97% - 100%)    ________________________________________________  PHYSICAL EXAM:  GENERAL: NAD  HEENT: Normocephalic;  conjunctivae and sclerae clear; moist mucous membranes;   NECK : supple  CHEST/LUNG: Clear to auscultation bilaterally with good air entry   HEART: S1 S2  regular; no murmurs, gallops or rubs  ABDOMEN: Soft, Nontender, distended; Bowel sounds present  EXTREMITIES: generalized edema to BUEs and BLEs   SKIN: warm and dry; no rash  NERVOUS SYSTEM:  Awake and alert; Oriented  to place, person and time ; no new deficits    _________________________________________________  LABS:                        8.9    7.25  )-----------( 252      ( 19 May 2020 05:55 )             27.8     05-19    140  |  107  |  12  ----------------------------<  97  3.3<L>   |  26  |  0.64    Ca    6.2<LL>      19 May 2020 05:55  Phos  1.3     -  Mg     1.8         TPro  3.9<L>  /  Alb  1.2<L>  /  TBili  1.4<H>  /  DBili  0.9<H>  /  AST  50<H>  /  ALT  66<H>  /  AlkPhos  152<H>      PT/INR - ( 18 May 2020 20:47 )   PT: 33.0 sec;   INR: 2.83 ratio         PTT - ( 18 May 2020 20:47 )  PTT:42.5 sec  Urinalysis Basic - ( 18 May 2020 21:53 )    Color: Yellow / Appearance: Clear / S.010 / pH: x  Gluc: x / Ketone: Negative  / Bili: Negative / Urobili: Negative   Blood: x / Protein: Negative / Nitrite: Negative   Leuk Esterase: Small / RBC: 0-2 /HPF / WBC 6-10 /HPF   Sq Epi: x / Non Sq Epi: Few /HPF / Bacteria: Few /HPF      CAPILLARY BLOOD GLUCOSE            RADIOLOGY & ADDITIONAL TESTS:    < from: CT Abdomen and Pelvis w/ IV Cont (20 @ 00:16) >  EXAM:  CT ABDOMEN AND PELVIS IC                            PROCEDURE DATE:  2020          INTERPRETATION:  CT ABDOMEN AND PELVIS WITH CONTRAST    INDICATION: Abdominal swelling. Increased abdominal girth.    TECHNIQUE: Contrast enhanced CT ofthe abdomen and pelvis. Images are reformatted in the sagittal and coronal planes.    90 mL of Omnipaque 350 contrast material was injected IV.    COMPARISON: CT abdomen pelvis 3/10/2020.    FINDINGS:    Lower Thorax: Small bilateral pleural effusionwith adjacent bibasilar opacities, likely represent dependent atelectasis. Recommend clinical correlation to assess for superimposed infection.    Liver: Steatosis.  Biliary: No significant dilatation. Cholelithiasis.  Spleen: No suspicious lesions.  Pancreas: No inflammatory changes or ductal dilatation.  Adrenals: Normal.  Kidneys: No hydronephrosis. 5 mm right renal angiomyolipoma.  Vessels: Mild atherosclerotic disease of the aorta and its branches. Celiac, SMA and KINA axes appears patent. Stable appearance of IVC filter.    GI tract: There is multisegmental wall thickening of small and large bowel loops with hyperemia, most pronounced at the level of jejunal ileal folds, concerning for enterocolitis, likely infectious or inflammatory nature. Small bowel anastomosis revisualized in the anterior mid abdomen with focal dilatation of bowel loops to 4.3 cm. Additional focally dilated fecalized jejunal loop measuring up to 4 cm in the left side of abdomen with suggestion of transition point in the left upper abdomen near surgical anastomosis images 62 through 66 of series 2 raising possibility of ileus or at least partial small bowel obstruction.    Peritoneum/retroperitoneum and mesentery: Large ascites, increased compared to the prior study. No free air. No organized fluid collection. No bulky adenopathy.    Pelvic organs/Bladder: No pelvic masses. Bladder is normal.    Abdominal wall: Diffuse anasarca.  Bones and soft tissues: Multilevel degenerative changes of the spine are noted with patchy osteopenia, limiting detailed evaluation.     IMPRESSION:    Multisegmental wall thickening of small and large bowel loops with hyperemia, most pronounced at the level of jejunal ileal folds, concerning for enterocolitis, likely infectious or inflammatory nature.     Small bowel anastomosis revisualized in the anterior mid abdomen with focal dilatation of bowel loops to 4.3 cm. Additional focally dilated fecalized jejunal loop measuring up to 4 cm in the left side of abdomen with suggestion of transition point in the left upper abdomen near surgical anastomosis, raising possibility of ileus or at least partial small bowel obstruction. No pneumatosis or findings of portal venous gas to suggest bowel ischemia. Correlate with lactic acid.    Large ascites, increased compared to the prior study.    Cholelithiasis.    Small bilateral pleural effusion with adjacent bibasilar opacities, likely represent dependent atelectasis. Recommend clinical correlation to assess for superimposed infection.                  EVGENY PANDA M.D., ATTENDING RADIOLOGIST  This document has been electronically signed. May 19 2020  1:29AM    < end of copied text >  Imaging Personally Reviewed:  YES    Consultant(s) Notes Reviewed:   YES    Care Discussed with Consultants : GI and surgery     Plan of care was discussed with patient and /or primary care giver; all questions and concerns were addressed and care was aligned with patient's wishes.

## 2020-05-19 NOTE — PROGRESS NOTE ADULT - SUBJECTIVE AND OBJECTIVE BOX
Patient was seen and examined  Patient is a 64y old  Female who presents with a chief complaint of Swelling, weakness (18 May 2020 23:33)      INTERVAL HPI/OVERNIGHT EVENTS:  T(C): 36.6 (20 @ 08:42), Max: 36.6 (20 @ 17:44)  HR: 98 (20 @ 08:42) (68 - 100)  BP: 98/67 (20 @ 08:42) (91/67 - 116/70)  RR: 19 (20 @ 08:42) (16 - 19)  SpO2: 99% (20 @ 08:42) (97% - 100%)  Wt(kg): --  I&O's Summary      LABS:                        8.9    7.25  )-----------( 252      ( 19 May 2020 05:55 )             27.8     -    140  |  107  |  12  ----------------------------<  97  3.3<L>   |  26  |  0.64    Ca    6.2<LL>      19 May 2020 05:55  Phos  1.3       Mg     1.8         TPro  3.9<L>  /  Alb  1.2<L>  /  TBili  1.4<H>  /  DBili  0.9<H>  /  AST  50<H>  /  ALT  66<H>  /  AlkPhos  152<H>      PT/INR - ( 18 May 2020 20:47 )   PT: 33.0 sec;   INR: 2.83 ratio         PTT - ( 18 May 2020 20:47 )  PTT:42.5 sec  Urinalysis Basic - ( 18 May 2020 21:53 )    Color: Yellow / Appearance: Clear / S.010 / pH: x  Gluc: x / Ketone: Negative  / Bili: Negative / Urobili: Negative   Blood: x / Protein: Negative / Nitrite: Negative   Leuk Esterase: Small / RBC: 0-2 /HPF / WBC 6-10 /HPF   Sq Epi: x / Non Sq Epi: Few /HPF / Bacteria: Few /HPF      CAPILLARY BLOOD GLUCOSE        LIPID PANEL  Cholesterol 61  LDL 27  HDL 5  RATIO HDL/Total Cholesterol --  Triglyceride 143        Urinalysis Basic - ( 18 May 2020 21:53 )    Color: Yellow / Appearance: Clear / S.010 / pH: x  Gluc: x / Ketone: Negative  / Bili: Negative / Urobili: Negative   Blood: x / Protein: Negative / Nitrite: Negative   Leuk Esterase: Small / RBC: 0-2 /HPF / WBC 6-10 /HPF   Sq Epi: x / Non Sq Epi: Few /HPF / Bacteria: Few /HPF        MEDICATIONS  (STANDING):  ciprofloxacin   IVPB 400 milliGRAM(s) IV Intermittent every 12 hours  ciprofloxacin   IVPB      furosemide   Injectable 20 milliGRAM(s) IV Push daily  metroNIDAZOLE    Tablet 500 milliGRAM(s) Oral every 8 hours  pantoprazole    Tablet 40 milliGRAM(s) Oral before breakfast  rivaroxaban 20 milliGRAM(s) Oral with dinner    MEDICATIONS  (PRN):      RADIOLOGY & ADDITIONAL TESTS:    Imaging Personally Reviewed:  [ ] YES  [ ] NO    REVIEW OF SYSTEMS:  CONSTITUTIONAL: No fever, weight loss, or fatigue  EYES: No eye pain, visual disturbances, or discharge  ENMT:  No difficulty hearing, tinnitus, vertigo; No sinus or throat pain  NECK: No pain or stiffness  BREASTS: No pain, masses, or nipple discharge  RESPIRATORY: No cough, wheezing, chills or hemoptysis; No shortness of breath  CARDIOVASCULAR: No chest pain, palpitations, dizziness, or leg swelling  GASTROINTESTINAL: No abdominal or epigastric pain. No nausea, vomiting, or hematemesis; No diarrhea or constipation. No melena or hematochezia.  GENITOURINARY: No dysuria, frequency, hematuria, or incontinence  NEUROLOGICAL: No headaches, memory loss, loss of strength, numbness, or tremors  SKIN: No itching, burning, rashes, or lesions   LYMPH NODES: No enlarged glands  ENDOCRINE: No heat or cold intolerance; No hair loss  MUSCULOSKELETAL: No joint pain or swelling; No muscle, back, or extremity pain  PSYCHIATRIC: No depression, anxiety, mood swings, or difficulty sleeping  HEME/LYMPH: No easy bruising, or bleeding gums  ALLERY AND IMMUNOLOGIC: No hives or eczema      Consultant(s) Notes Reviewed:  [ x ] YES  [ ] NO    PHYSICAL EXAM:  GENERAL: NAD, well-groomed, well-developed  HEAD:  Atraumatic, Normocephalic  EYES: EOMI, PERRLA, conjunctiva and sclera clear  ENMT: No tonsillar erythema, exudates, or enlargement; Moist mucous membranes, Good dentition, No lesions  NECK: Supple, No JVD, Normal thyroid  NERVOUS SYSTEM:  awake lethargic generalized weakness  CHEST/LUNG: Clear to percussion bilaterally; No rales, rhonchi, wheezing, or rubs  HEART: Regular rate and rhythm; No murmurs, rubs, or gallops  ABDOMEN: Soft, Nontender, Nondistended; Bowel sounds present  EXTREMITIES:  2+ Peripheral Pulses, No clubbing, cyanosis, or edema  LYMPH: No lymphadenopathy noted  SKIN: No rashes or lesions    Care Discussed with Consultants/Other Providers [ x] YES  [ ] NO

## 2020-05-19 NOTE — CONSULT NOTE ADULT - ASSESSMENT
65 YO F with anasarca and possible colitis seen on CT     - Will need diagnostic paracentesis- need to get cell count and differential, albumin, total protein, cytology, triglycerides   - check LFTs same day after the paracentesis  - check viral hepatitis panel, KATHY, ASMA, AMA, ferritin iron profile 65 YO F with anasarca and possible colitis seen on CT.    #Anasarca  - due to hypoalbuminemia, which is of unclear etiology, but is chronic in nature  - will need diagnostic paracentesis- need to get cell count and differential, fluid albumin, fluid total protein, cytology  - check LFTs same day as the paracentesis  - check viral hepatitis panel, KATHY, ASMA, AMA, ferritin iron profile   - given normal Plt there is a possibility that pt's problem is not of hepatic origin, that is why need to know total fluid protein and SAAG    #Colitis  - no diarrhea or bleeding; no leukocytosis  - unclear if pt has actual colitis vs. bowel wall edema due to severe hypoalbuminemia  - consider stopping abx if primary team agrees

## 2020-05-20 NOTE — PROGRESS NOTE ADULT - SUBJECTIVE AND OBJECTIVE BOX
INTERVAL HPI/OVERNIGHT EVENTS:  Pt resting comfortably. No acute complaints.   NPO for IR paracentesis.   +flatus/BM.   Denies N/V, abd pain.    MEDICATIONS  (STANDING):  calcium gluconate IVPB 2 Gram(s) IV Intermittent once  ciprofloxacin   IVPB 400 milliGRAM(s) IV Intermittent every 12 hours  ciprofloxacin   IVPB      furosemide   Injectable 20 milliGRAM(s) IV Push daily  metroNIDAZOLE    Tablet 500 milliGRAM(s) Oral every 8 hours  pantoprazole    Tablet 40 milliGRAM(s) Oral before breakfast  potassium chloride  10 mEq/100 mL IVPB 10 milliEquivalent(s) IV Intermittent every 1 hour  potassium phosphate IVPB 30 milliMole(s) IV Intermittent once    Vital Signs Last 24 Hrs  T(C): 36.6 (20 May 2020 05:03), Max: 36.7 (19 May 2020 09:54)  T(F): 97.8 (20 May 2020 05:03), Max: 98.1 (19 May 2020 14:58)  HR: 83 (20 May 2020 05:03) (83 - 98)  BP: 94/66 (20 May 2020 05:03) (90/40 - 98/67)  BP(mean): --  RR: 17 (20 May 2020 05:03) (17 - 19)  SpO2: 96% (20 May 2020 05:03) (96% - 99%)    Physical:  General: NAD.  Abdomen: Soft distended, nontender.    LABS:                        8.1    5.40  )-----------( 216      ( 20 May 2020 06:56 )             25.7             05-20    141  |  108  |  10  ----------------------------<  79  3.4<L>   |  25  |  0.60    Ca    5.9<LL>      20 May 2020 06:56  Phos  1.4     05-20  Mg     1.7     05-20    TPro  3.5<L>  /  Alb  1.0<L>  /  TBili  1.3<H>  /  DBili  x   /  AST  50<H>  /  ALT  61<H>  /  AlkPhos  132<H>  05-20

## 2020-05-20 NOTE — PROGRESS NOTE ADULT - SUBJECTIVE AND OBJECTIVE BOX
PGY1 Note discussed with Supervising Resident and Primary Attending.    Patient is a 64y old  Female who presents with a chief complaint of Swelling, weakness (20 May 2020 08:19)      INTERVAL HPI/OVERNIGHT EVENTS :  No acute event overnight    ***********************************************************************************************************    MEDICATIONS  (STANDING):  ciprofloxacin   IVPB 400 milliGRAM(s) IV Intermittent every 12 hours  ciprofloxacin   IVPB      furosemide   Injectable 20 milliGRAM(s) IV Push daily  metroNIDAZOLE    Tablet 500 milliGRAM(s) Oral every 8 hours  pantoprazole    Tablet 40 milliGRAM(s) Oral before breakfast  phytonadione   Solution 5 milliGRAM(s) Oral once    MEDICATIONS  (PRN):      ***********************************************************************************************************    Allergies    No Known Allergies    Intolerances        ***********************************************************************************************************    REVIEW OF SYSTEMS :  * CONSTITUTIONAL      : No Fever, Weight loss, or + Fatigue  * EYES                             : No eye pain , Visual disturbances or Discharge  * RESPIRATORY             : No Cough, Wheezing, Chills or Hemoptysis; No shortness of breath  * CARDIOVASCULAR     : No Chest pain, Palpitations, Dizziness, or + Leg swelling  * GASTROINTESTINAL  : no Abdominal or Epigastric pain. No Nausea, Vomiting  * NEUROLOGICAL          : No Headaches,   * MUSCULOSKELETAL   : No Joint pain  * SKIN                               : chronic skin changes present bilaterally LE    ***********************************************************************************************************    Vital Signs Last 24 Hrs  T(C): 36.6 (20 May 2020 05:03), Max: 36.7 (19 May 2020 09:54)  T(F): 97.8 (20 May 2020 05:03), Max: 98.1 (19 May 2020 14:58)  HR: 83 (20 May 2020 05:03) (83 - 91)  BP: 94/66 (20 May 2020 05:03) (90/40 - 94/66)  BP(mean): --  RR: 17 (20 May 2020 05:03) (17 - 18)  SpO2: 96% (20 May 2020 05:03) (96% - 98%)    ***********************************************************************************************************    PHYSICAL EXAM :  * GENERAL                 : NAD, CACHECTIC, anasarca  * HEAD                       :  Atraumatic, Normocephalic  * EYES                         :  Conjunctiva and Sclera YELLOWISH  * ENT                           : Moist Mucous Membranes  * NECK                         : Supple,   * CHEST/LUNG           :  No Rales, Rhonchi, Wheezing or Rubs  * HEART                       :  No murmurs, Rubs or gallops  * ABDOMEN                : Soft, Non-tender, distended;FLUID thrill present  * NERVOUS SYSTEM  :  Alert & Oriented   * EXTREMITIES            :  2+ Peripheral Pulses, pitting  edema present  * SKIN                           : chronic skin changes present    **********************************************************************************************************  LABS:                          8.1    5.40  )-----------( 216      ( 20 May 2020 06:56 )             25.7         141  |  108  |  10  ----------------------------<  79  3.4<L>   |  25  |  0.60    Ca    5.9<LL>      20 May 2020 06:56  Phos  1.4       Mg     1.7         TPro  3.5<L>  /  Alb  1.0<L>  /  TBili  1.3<H>  /  DBili  x   /  AST  50<H>  /  ALT  61<H>  /  AlkPhos  132<H>      PT/INR - ( 20 May 2020 06:57 )   PT: 43.0 sec;   INR: 3.66 ratio         PTT - ( 20 May 2020 06:57 )  PTT:47.9 sec  Urinalysis Basic - ( 18 May 2020 21:53 )    Color: Yellow / Appearance: Clear / S.010 / pH: x  Gluc: x / Ketone: Negative  / Bili: Negative / Urobili: Negative   Blood: x / Protein: Negative / Nitrite: Negative   Leuk Esterase: Small / RBC: 0-2 /HPF / WBC 6-10 /HPF   Sq Epi: x / Non Sq Epi: Few /HPF / Bacteria: Few /HPF      CAPILLARY BLOOD GLUCOSE          **********************************************************************************************************    RADIOLOGY & ADDITIONAL TESTS:   No radiological imaging was required    Imaging Personally Reviewed   :  [ x] YES  [ ] NO    Consultant(s) Notes Reviewed :  [ x] YES  [ ] NO

## 2020-05-20 NOTE — CONSULT NOTE ADULT - ASSESSMENT
Enterocolitis - reported on CT scan but is likely intestinal swelling from anasarca from low protein and albumin levels, no evidence of infection    Plan - DC all abxs  recommend malignancy work up  reconsult prn.

## 2020-05-20 NOTE — PROGRESS NOTE ADULT - ASSESSMENT
64y.o. Female with clinically resolving SBO    -IR paracentesis as scheduled  -Serial abd films  -Keep NPO for now  -IVF when NPO  -OOB ambulate

## 2020-05-20 NOTE — CONSULT NOTE ADULT - GASTROINTESTINAL DETAILS
soft/nontender no organomegaly/no rebound tenderness/no rigidity/bowel sounds normal/soft/nontender/no masses palpable/no guarding

## 2020-05-20 NOTE — PROGRESS NOTE ADULT - SUBJECTIVE AND OBJECTIVE BOX
Patient was seen and examined  Patient is a 64y old  Female who presents with a chief complaint of Swelling, weakness (19 May 2020 12:06)      INTERVAL HPI/OVERNIGHT EVENTS:  T(C): 36.6 (20 @ 05:03), Max: 36.7 (20 @ 09:54)  HR: 83 (20 @ 05:03) (83 - 98)  BP: 94/66 (20 @ 05:03) (90/40 - 98/67)  RR: 17 (20 @ 05:03) (17 - 19)  SpO2: 96% (20 @ 05:03) (96% - 99%)  Wt(kg): --  I&O's Summary      LABS:                        8.9    7.25  )-----------( 252      ( 19 May 2020 05:55 )             27.8     -    141  |  110<H>  |  10  ----------------------------<  96  3.7   |  27  |  0.57    Ca    5.6<LL>      19 May 2020 19:10  Phos  1.3       Mg     1.8         TPro  3.5<L>  /  Alb  0.9<L>  /  TBili  1.3<H>  /  DBili  x   /  AST  49<H>  /  ALT  60  /  AlkPhos  143<H>      PT/INR - ( 18 May 2020 20:47 )   PT: 33.0 sec;   INR: 2.83 ratio         PTT - ( 18 May 2020 20:47 )  PTT:42.5 sec  Urinalysis Basic - ( 18 May 2020 21:53 )    Color: Yellow / Appearance: Clear / S.010 / pH: x  Gluc: x / Ketone: Negative  / Bili: Negative / Urobili: Negative   Blood: x / Protein: Negative / Nitrite: Negative   Leuk Esterase: Small / RBC: 0-2 /HPF / WBC 6-10 /HPF   Sq Epi: x / Non Sq Epi: Few /HPF / Bacteria: Few /HPF      CAPILLARY BLOOD GLUCOSE        LIPID PANEL  Cholesterol 61  LDL 27  HDL 5  RATIO HDL/Total Cholesterol --  Triglyceride 143        Urinalysis Basic - ( 18 May 2020 21:53 )    Color: Yellow / Appearance: Clear / S.010 / pH: x  Gluc: x / Ketone: Negative  / Bili: Negative / Urobili: Negative   Blood: x / Protein: Negative / Nitrite: Negative   Leuk Esterase: Small / RBC: 0-2 /HPF / WBC 6-10 /HPF   Sq Epi: x / Non Sq Epi: Few /HPF / Bacteria: Few /HPF        MEDICATIONS  (STANDING):  ciprofloxacin   IVPB 400 milliGRAM(s) IV Intermittent every 12 hours  ciprofloxacin   IVPB      furosemide   Injectable 20 milliGRAM(s) IV Push daily  metroNIDAZOLE    Tablet 500 milliGRAM(s) Oral every 8 hours  pantoprazole    Tablet 40 milliGRAM(s) Oral before breakfast    MEDICATIONS  (PRN):      RADIOLOGY & ADDITIONAL TESTS:    Imaging Personally Reviewed:  [ ] YES  [ ] NO    REVIEW OF SYSTEMS:  CONSTITUTIONAL: No fever, weight loss, or fatigue  EYES: No eye pain, visual disturbances, or discharge  ENMT:  No difficulty hearing, tinnitus, vertigo; No sinus or throat pain  NECK: No pain or stiffness  BREASTS: No pain, masses, or nipple discharge  RESPIRATORY: No cough, wheezing, chills or hemoptysis; No shortness of breath  CARDIOVASCULAR: No chest pain, palpitations, dizziness, or leg swelling  GASTROINTESTINAL: No abdominal or epigastric pain. No nausea, vomiting, or hematemesis; No diarrhea or constipation. No melena or hematochezia.  GENITOURINARY: No dysuria, frequency, hematuria, or incontinence  NEUROLOGICAL: No headaches, memory loss, loss of strength, numbness, or tremors  SKIN: No itching, burning, rashes, or lesions   LYMPH NODES: No enlarged glands  ENDOCRINE: No heat or cold intolerance; No hair loss  MUSCULOSKELETAL: No joint pain or swelling; No muscle, back, or extremity pain  PSYCHIATRIC: No depression, anxiety, mood swings, or difficulty sleeping  HEME/LYMPH: No easy bruising, or bleeding gums  ALLERY AND IMMUNOLOGIC: No hives or eczema      Consultant(s) Notes Reviewed:  [ x ] YES  [ ] NO    PHYSICAL EXAM:  GENERAL: NAD, well-groomed, well-developed  HEAD:  Atraumatic, Normocephalic  EYES: EOMI, PERRLA, conjunctiva and sclera clear  ENMT: No tonsillar erythema, exudates, or enlargement; Moist mucous membranes, Good dentition, No lesions  NECK: Supple, No JVD, Normal thyroid  NERVOUS SYSTEM:  Alert & Oriented X3, Good concentration; Motor Strength 5/5 B/L upper and lower extremities; DTRs 2+ intact and symmetric  CHEST/LUNG: Clear to percussion bilaterally; No rales, rhonchi, wheezing, or rubs  HEART: Regular rate and rhythm; No murmurs, rubs, or gallops  ABDOMEN:  soft, distended  EXTREMITIES:  2+ Peripheral Pulses, No clubbing, cyanosis, or edema  LYMPH: No lymphadenopathy noted  SKIN: No rashes or lesions    Care Discussed with Consultants/Other Providers [ x] YES  [ ] NO

## 2020-05-20 NOTE — CONSULT NOTE ADULT - RS GEN PE MLT RESP DETAILS PC
breath sounds equal/airway patent/good air movement breath sounds equal/good air movement/no rales/clear to auscultation bilaterally/no wheezes/no rhonchi

## 2020-05-21 NOTE — CHART NOTE - NSCHARTNOTEFT_GEN_A_CORE
Upon Nutritional Assessment by the Registered Dietitian your patient was determined to meet criteria / has evidence of the following diagnosis/diagnoses:          [ ]  Mild Protein Calorie Malnutrition        [ ]  Moderate Protein Calorie Malnutrition        [ X] Severe Protein Calorie Malnutrition        [ ] Unspecified Protein Calorie Malnutrition        [ ] Underweight / BMI <19        [ ] Morbid Obesity / BMI > 40      Findings as based on:  •  Comprehensive nutrition assessment and consultation  •  Calorie counts (nutrient intake analysis)  •  Food acceptance and intake status from observations by staff  •  Follow up  •  Patient education  •  Intervention secondary to interdisciplinary rounds  •   concerns      Treatment:    The following diet has been recommended:      PROVIDER Section:     By signing this assessment you are acknowledging and agree with the diagnosis/diagnoses assigned by the Registered Dietitian    Comments:  Advance diet with nutrition supplement as medically feasible or may consider alternate nutrition support if NPO prolonged.

## 2020-05-21 NOTE — DIETITIAN INITIAL EVALUATION ADULT. - NS FNS REASON FOR WEIGHT CHANG
chronic illness with Ascites/generalized edema/fluid retention/altered GI function (specify)/decreased po intake/other (specify)

## 2020-05-21 NOTE — PHYSICAL THERAPY INITIAL EVALUATION ADULT - GENERAL OBSERVATIONS, REHAB EVAL
Pt. found lying supine; appears weak. Philipp UE's and LE's remain swollen. Pt. cooperative and motivated during eval.

## 2020-05-21 NOTE — PROCEDURE NOTE - NSPROCDETAILS_GEN_ALL_CORE
ultrasound utilization/location identified, draped/prepped, sterile technique used/blood seen on insertion/dressing applied/flushes easily/secured in place

## 2020-05-21 NOTE — DIETITIAN INITIAL EVALUATION ADULT. - ADD RECOMMEND
Advance diet with nutrition supplement as medically feasible or may consider alternate nutrition support if NPO prolonged

## 2020-05-21 NOTE — PROGRESS NOTE ADULT - PROBLEM SELECTOR PLAN 1
- IR guided tab today   on xarelto which is on hold for IR GUIDED paracentesis    INR 2.4 - 2 units FFP given prior to IR procedure - IR guided tap today   on xarelto which is on hold for IR GUIDED paracentesis    INR 2.4 - 2 units FFP given prior to IR procedure - IR guided tap today - 1700 removed  on xarelto which is on hold for IR GUIDED paracentesis    INR 2.4 - 2 units FFP given prior to IR procedure

## 2020-05-21 NOTE — DIETITIAN INITIAL EVALUATION ADULT. - PERTINENT MEDS FT
MEDICATIONS  (STANDING):  furosemide   Injectable 20 milliGRAM(s) IV Push daily  pantoprazole    Tablet 40 milliGRAM(s) Oral before breakfast  potassium chloride  10 mEq/100 mL IVPB 10 milliEquivalent(s) IV Intermittent every 1 hour  potassium phosphate IVPB 15 milliMole(s) IV Intermittent once    MEDICATIONS  (PRN):

## 2020-05-21 NOTE — PROGRESS NOTE ADULT - SUBJECTIVE AND OBJECTIVE BOX
Patient was seen and examined  Patient is a 64y old  Female who presents with a chief complaint of Swelling, weakness (20 May 2020 11:31)      INTERVAL HPI/OVERNIGHT EVENTS:  T(C): 36.3 (05-20-20 @ 21:17), Max: 36.3 (05-20-20 @ 20:28)  HR: 91 (05-20-20 @ 21:17) (76 - 93)  BP: 99/71 (05-20-20 @ 21:17) (86/56 - 101/73)  RR: 18 (05-20-20 @ 21:17) (18 - 18)  SpO2: 99% (05-20-20 @ 21:17) (96% - 99%)  Wt(kg): --  I&O's Summary      LABS:                        8.1    5.40  )-----------( 216      ( 20 May 2020 06:56 )             25.7     05-20    141  |  108  |  10  ----------------------------<  79  3.4<L>   |  25  |  0.60    Ca    5.9<LL>      20 May 2020 06:56  Phos  1.4     05-20  Mg     1.7     05-20    TPro  3.5<L>  /  Alb  1.0<L>  /  TBili  1.3<H>  /  DBili  0.8<H>  /  AST  50<H>  /  ALT  61<H>  /  AlkPhos  132<H>  05-20    PT/INR - ( 20 May 2020 06:57 )   PT: 43.0 sec;   INR: 3.66 ratio         PTT - ( 20 May 2020 06:57 )  PTT:47.9 sec    CAPILLARY BLOOD GLUCOSE        LIPID PANEL  Cholesterol 61  LDL 27  HDL 5  RATIO HDL/Total Cholesterol --  Triglyceride 143            MEDICATIONS  (STANDING):  furosemide   Injectable 20 milliGRAM(s) IV Push daily  pantoprazole    Tablet 40 milliGRAM(s) Oral before breakfast    MEDICATIONS  (PRN):      RADIOLOGY & ADDITIONAL TESTS:    Imaging Personally Reviewed:  [ ] YES  [ ] NO    REVIEW OF SYSTEMS:  CONSTITUTIONAL: No fever, weight loss, or fatigue  EYES: No eye pain, visual disturbances, or discharge  ENMT:  No difficulty hearing, tinnitus, vertigo; No sinus or throat pain  NECK: No pain or stiffness  BREASTS: No pain, masses, or nipple discharge  RESPIRATORY: No cough, wheezing, chills or hemoptysis; No shortness of breath  CARDIOVASCULAR: No chest pain, palpitations, dizziness, or leg swelling  GASTROINTESTINAL: No abdominal or epigastric pain. No nausea, vomiting, or hematemesis; No diarrhea or constipation. No melena or hematochezia.  GENITOURINARY: No dysuria, frequency, hematuria, or incontinence  NEUROLOGICAL: No headaches, memory loss, loss of strength, numbness, or tremors  SKIN: No itching, burning, rashes, or lesions   LYMPH NODES: No enlarged glands  ENDOCRINE: No heat or cold intolerance; No hair loss  MUSCULOSKELETAL: No joint pain or swelling; No muscle, back, or extremity pain  PSYCHIATRIC: No depression, anxiety, mood swings, or difficulty sleeping  HEME/LYMPH: No easy bruising, or bleeding gums  ALLERY AND IMMUNOLOGIC: No hives or eczema      Consultant(s) Notes Reviewed:  [ x ] YES  [ ] NO    PHYSICAL EXAM:  GENERAL: NAD, well-groomed, well-developed  HEAD:  Atraumatic, Normocephalic  EYES: EOMI, PERRLA, conjunctiva and sclera clear  ENMT: No tonsillar erythema, exudates, or enlargement; Moist mucous membranes, Good dentition, No lesions  NECK: Supple, No JVD, Normal thyroid  NERVOUS SYSTEM:  Alert & Oriented X3, Good concentration; Motor Strength 5/5 B/L upper and lower extremities; DTRs 2+ intact and symmetric  CHEST/LUNG: Clear to percussion bilaterally; No rales, rhonchi, wheezing, or rubs  HEART: Regular rate and rhythm; No murmurs, rubs, or gallops  ABDOMEN: distended soft   EXTREMITIES:  2+ Peripheral Pulses, No clubbing, cyanosis, or edema  LYMPH: No lymphadenopathy noted  SKIN: No rashes or lesions    Care Discussed with Consultants/Other Providers [ x] YES  [ ] NO

## 2020-05-21 NOTE — DIETITIAN INITIAL EVALUATION ADULT. - PERTINENT LABORATORY DATA
05-21 Na140 mmol/L Glu 113 mg/dL<H> K+ 3.5 mmol/L Cr  0.74 mg/dL BUN 9 mg/dL 05-21 Phos 1.7 mg/dL<L> 05-21 Alb 1.0 g/dL<L> 05-19 Chol 61 mg/dL LDL 27 mg/dL HDL 5 mg/dL<L> Trig 143 mg/dL

## 2020-05-21 NOTE — PROGRESS NOTE ADULT - ASSESSMENT
Patient is a 64y old  Female who presents with a chief complaint of Swelling, weakness   PNEUMONIA POSSIBLE IV ABX AS PER ID

## 2020-05-21 NOTE — PROGRESS NOTE ADULT - PROBLEM SELECTOR PLAN 5
No abdominal pain  No evidence of bowel obstruction  surgical eval noted - no surgical intervention at this time

## 2020-05-21 NOTE — DIETITIAN INITIAL EVALUATION ADULT. - DIET TYPE
PTA - per pt. drinks 1-2 bottles of Ensure Plus, 8oz daily soft/recommend when diet advance with Ensure Enlive 1 can TID as medically feasible/DASH/TLC (sodium and cholesterol restricted diet)

## 2020-05-21 NOTE — PHYSICAL THERAPY INITIAL EVALUATION ADULT - CRITERIA FOR SKILLED THERAPEUTIC INTERVENTIONS
functional limitations in following categories/predicted duration of therapy intervention/anticipated discharge recommendation/risk reduction/prevention/rehab potential/impairments found/therapy frequency

## 2020-05-21 NOTE — DIETITIAN INITIAL EVALUATION ADULT. - OTHER INFO
Patient from home lives with daughter. Pt. not in isolation, visited pt. alert but weak, reports poor po intake x1 month, Patient from home lives with daughter. Pt. not in isolation, visited pt. alert but weak, reports poor po intake x1 month, drinking mostly liquid meals (eg; Milo/milk, Ensure Plus) or very small meals & becoming weaker, denies nausea/vomiting or diarrhea but having BM PTA. Per pt. loss weight in the past but recently started to have fluid build up in her abdomin/legs with feelings of "fullness", also at last admission pt. dx. with malnutrition by   CELESTINE reina 1/27/2 noted. Presently pt. NPO, awaiting paracentesis & followed by IR, received 1 dose of Albumin, GI/team consulted, ascites/edema 3+ generalized noted, skin intact, d/w RN. Patient from home lives with daughter. Pt. not in isolation, visited pt. alert but weak, reports poor po intake x1 month, drinking mostly liquid meals (eg; Milo/milk, Ensure Plus) or very small meals & becoming weaker, denies nausea/vomiting or diarrhea but having BM PTA. Per pt. loss weight in the past but recently started to have fluid build up in her abdomin/legs with feelings of "fullness". Also at last admission pt. dx. with malnutrition by   RD on 1/27/2 noted. Presently pt. NPO, awaiting paracentesis & followed by IR, received 1 dose of Albumin, GI/team consulted, ascites/edema 3+ generalized noted, skin intact, d/w RN.

## 2020-05-21 NOTE — DIETITIAN INITIAL EVALUATION ADULT. - SIGNS/SYMPTOMS
Poor intake [id number] w/generalized edema/ascites masking wt. loss pending paracentesis, ongoing PCP Poor intake [id number] w/generalized edema/ascites masking wt. loss pending paracentesis, ongoing PCM

## 2020-05-21 NOTE — PROGRESS NOTE ADULT - SUBJECTIVE AND OBJECTIVE BOX
NP Note discussed with  Primary Attending    Patient is a 64y old  Female who presents with a chief complaint of Swelling, weakness (21 May 2020 07:18)      INTERVAL HPI/OVERNIGHT EVENTS: no new complaints    MEDICATIONS  (STANDING):  furosemide   Injectable 20 milliGRAM(s) IV Push daily  pantoprazole    Tablet 40 milliGRAM(s) Oral before breakfast  potassium chloride  10 mEq/100 mL IVPB 10 milliEquivalent(s) IV Intermittent every 1 hour  potassium phosphate IVPB 15 milliMole(s) IV Intermittent once    MEDICATIONS  (PRN):      __________________________________________________  REVIEW OF SYSTEMS:    CONSTITUTIONAL: No fever,   EYES: no acute visual disturbances  NECK: No pain or stiffness  RESPIRATORY: No cough; No shortness of breath  CARDIOVASCULAR: No chest pain, no palpitations  GASTROINTESTINAL: No pain. No nausea or vomiting; No diarrhea   NEUROLOGICAL: No headache or numbness, no tremors  MUSCULOSKELETAL: No joint pain, no muscle pain  GENITOURINARY: no dysuria, no frequency, no hesitancy  PSYCHIATRY: no depression , no anxiety  ALL OTHER  ROS negative        Vital Signs Last 24 Hrs  T(C): 34.7 (21 May 2020 13:10), Max: 36.4 (21 May 2020 10:55)  T(F): 94.5 (21 May 2020 13:10), Max: 97.5 (21 May 2020 10:55)  HR: 89 (21 May 2020 13:10) (87 - 98)  BP: 107/89 (21 May 2020 13:10) (99/67 - 123/69)  BP(mean): 82 (21 May 2020 11:35) (78 - 82)  RR: 18 (21 May 2020 13:10) (17 - 18)  SpO2: 100% (21 May 2020 13:10) (99% - 100%)    ________________________________________________  PHYSICAL EXAM:  GENERAL: NAD  HEENT: Normocephalic;  conjunctivae and sclerae clear; moist mucous membranes;   NECK : supple  CHEST/LUNG: Clear to auscultation bilaterally with good air entry   HEART: S1 S2  regular; no murmurs, gallops or rubs  ABDOMEN: Soft, Nontender, Nondistended; Bowel sounds present  EXTREMITIES: no cyanosis; no edema; no calf tenderness  SKIN: warm and dry; no rash  NERVOUS SYSTEM:  Awake and alert; Oriented  to place, person and time ; no new deficits    _________________________________________________  LABS:                        8.8    5.42  )-----------( 167      ( 21 May 2020 07:11 )             28.8     05-21    140  |  110<H>  |  9   ----------------------------<  113<H>  3.5   |  21<L>  |  0.74    Ca    5.4<LL>      21 May 2020 07:11  Phos  1.7     05-21  Mg     1.6     05-21    TPro  3.7<L>  /  Alb  1.0<L>  /  TBili  1.5<H>  /  DBili  x   /  AST  66<H>  /  ALT  78<H>  /  AlkPhos  140<H>  05-21    PT/INR - ( 21 May 2020 09:06 )   PT: 28.1 sec;   INR: 2.42 ratio         PTT - ( 21 May 2020 09:06 )  PTT:52.2 sec    CAPILLARY BLOOD GLUCOSE            RADIOLOGY & ADDITIONAL TESTS:    Imaging Personally Reviewed:  YES/NO    Consultant(s) Notes Reviewed:   YES/ No    Care Discussed with Consultants :     Plan of care was discussed with patient and /or primary care giver; all questions and concerns were addressed and care was aligned with patient's wishes.

## 2020-05-21 NOTE — DIETITIAN INITIAL EVALUATION ADULT. - FACTORS AFF FOOD INTAKE
other (specify)/weakness, anasarca,  recurring SBO, colitis, DVT0, hypoalbuminemia, s/p exp lap, SB resection in 2015, ex lap, ALBINA in 2018,/pain

## 2020-05-21 NOTE — PHYSICAL THERAPY INITIAL EVALUATION ADULT - DIAGNOSIS, PT EVAL
Overall decline in functional mobility due to generalized weakness and swelling, decreased balance and endurance.

## 2020-05-22 NOTE — PROGRESS NOTE ADULT - SUBJECTIVE AND OBJECTIVE BOX
NP Note discussed with  Primary Attending    64 year old female from home walks with cane, with PMH of recurrent SBO's, s/p exp lap, SB resection in 2015, ex lap, ALBINA in 2018, DVT, PE, on Xarelto, IVC filter, chronic leg swelling, and anasarca, came in to the ED after being sent by PCP, Dr. Ross for generalized weakness and increased in abdominal girth. Patient admitted with anasarca with increased asci ties along with severe hypoalbuminemia. CT abdomen showed colitis and concern for SBO. Initally treated with antibiotics, dc'd per ID. S/P paracentesis in IR on 5/21.     INTERVAL HPI/OVERNIGHT EVENTS: Hypothermic overnight, improved with bearhugger blanket     MEDICATIONS  (STANDING):  furosemide   Injectable 20 milliGRAM(s) IV Push daily  pantoprazole    Tablet 40 milliGRAM(s) Oral before breakfast  potassium phosphate IVPB 15 milliMole(s) IV Intermittent once    MEDICATIONS  (PRN):      __________________________________________________  REVIEW OF SYSTEMS:    CONSTITUTIONAL: No fever,   EYES: no acute visual disturbances  NECK: No pain or stiffness  RESPIRATORY: No cough; No shortness of breath  CARDIOVASCULAR: No chest pain, no palpitations  GASTROINTESTINAL: No pain. No nausea or vomiting; No diarrhea   NEUROLOGICAL: No headache or numbness, no tremors  MUSCULOSKELETAL: No joint pain, no muscle pain  GENITOURINARY: no dysuria, no frequency, no hesitancy  PSYCHIATRY: no depression , no anxiety  ALL OTHER  ROS negative        Vital Signs Last 24 Hrs  T(C): 36.7 (22 May 2020 05:00), Max: 36.9 (22 May 2020 03:50)  T(F): 98.1 (22 May 2020 05:00), Max: 98.4 (22 May 2020 03:50)  HR: 91 (22 May 2020 05:00) (82 - 91)  BP: 98/67 (22 May 2020 09:19) (91/82 - 114/74)  BP(mean): --  RR: 18 (22 May 2020 05:00) (17 - 18)  SpO2: 98% (22 May 2020 05:00) (98% - 100%)    ________________________________________________  PHYSICAL EXAM:  GENERAL: NAD  HEENT: Normocephalic;  conjunctivae and sclerae clear; moist mucous membranes;   NECK : supple  CHEST/LUNG: Clear to auscultation bilaterally with good air entry   HEART: S1 S2  regular; no murmurs, gallops or rubs  ABDOMEN: Soft, Nontender, Nondistended; Bowel sounds present  EXTREMITIES: no cyanosis; no edema; no calf tenderness  SKIN: warm and dry; no rash  NERVOUS SYSTEM:  Awake and alert; Oriented  to place, person and time ; no new deficits    _________________________________________________  LABS:                        7.5    3.95  )-----------( 131      ( 22 May 2020 08:45 )             24.7     05-22    142  |  112<H>  |  6<L>  ----------------------------<  62<L>  3.8   |  24  |  0.63    Ca    5.8<LL>      22 May 2020 08:45  Phos  1.5     05-22  Mg     1.7     05-22    TPro  4.4<L>  /  Alb  1.7<L>  /  TBili  2.3<H>  /  DBili  x   /  AST  70<H>  /  ALT  71<H>  /  AlkPhos  130<H>  05-22    PT/INR - ( 22 May 2020 08:45 )   PT: 22.4 sec;   INR: 1.94 ratio         PTT - ( 21 May 2020 09:06 )  PTT:52.2 sec    CAPILLARY BLOOD GLUCOSE            RADIOLOGY & ADDITIONAL TESTS:    Imaging  Reviewed:  YES/NO    Consultant(s) Notes Reviewed:   YES/ No      Plan of care was discussed with patient and /or primary care giver; all questions and concerns were addressed NP Note discussed with  Primary Attending    64 year old female from home walks with cane, with PMH of recurrent SBO's, s/p exp lap, SB resection in 2015, ex lap, ALBINA in 2018, DVT, PE, on Xarelto, IVC filter, chronic leg swelling, and anasarca, came in to the ED after being sent by PCP, Dr. Ross for generalized weakness and increased in abdominal girth. Patient admitted with anasarca with increased asci ties along with severe hypoalbuminemia. CT abdomen showed colitis and concern for SBO. Initally treated with antibiotics, dc'd per ID. S/P paracentesis in IR on 5/21.     INTERVAL HPI/OVERNIGHT EVENTS: Hypothermic overnight, improved with bearhugger blanket     MEDICATIONS  (STANDING):  furosemide   Injectable 20 milliGRAM(s) IV Push daily  pantoprazole    Tablet 40 milliGRAM(s) Oral before breakfast  potassium phosphate IVPB 15 milliMole(s) IV Intermittent once    MEDICATIONS  (PRN):      __________________________________________________  REVIEW OF SYSTEMS:    CONSTITUTIONAL: No fever,   EYES: no acute visual disturbances  NECK: No pain or stiffness  RESPIRATORY: No cough; No shortness of breath  CARDIOVASCULAR: No chest pain, no palpitations  GASTROINTESTINAL: No pain. No nausea or vomiting; No diarrhea   NEUROLOGICAL: No headache or numbness, no tremors  MUSCULOSKELETAL: No joint pain, no muscle pain  GENITOURINARY: no dysuria, no frequency, no hesitancy  PSYCHIATRY: no depression , no anxiety  ALL OTHER  ROS negative        Vital Signs Last 24 Hrs  T(C): 36.7 (22 May 2020 05:00), Max: 36.9 (22 May 2020 03:50)  T(F): 98.1 (22 May 2020 05:00), Max: 98.4 (22 May 2020 03:50)  HR: 91 (22 May 2020 05:00) (82 - 91)  BP: 98/67 (22 May 2020 09:19) (91/82 - 114/74)  BP(mean): --  RR: 18 (22 May 2020 05:00) (17 - 18)  SpO2: 98% (22 May 2020 05:00) (98% - 100%)    ________________________________________________  PHYSICAL EXAM:  GENERAL: generalized pitting edema   HEENT: Normocephalic;  conjunctivae and sclerae clear; moist mucous membranes;   NECK : supple  CHEST/LUNG: Clear to auscultation bilaterally with good air entry   HEART: S1 S2  regular; no murmurs, gallops or rubs  ABDOMEN: Soft, Nontender, Nondistended; Bowel sounds present  EXTREMITIES: no cyanosis; no edema; no calf tenderness  SKIN: warm and dry; no rash  NERVOUS SYSTEM:  Awake and alert; Oriented  to place, person and time ; no new deficits    _________________________________________________  LABS:                        7.5    3.95  )-----------( 131      ( 22 May 2020 08:45 )             24.7     05-22    142  |  112<H>  |  6<L>  ----------------------------<  62<L>  3.8   |  24  |  0.63    Ca    5.8<LL>      22 May 2020 08:45  Phos  1.5     05-22  Mg     1.7     05-22    TPro  4.4<L>  /  Alb  1.7<L>  /  TBili  2.3<H>  /  DBili  x   /  AST  70<H>  /  ALT  71<H>  /  AlkPhos  130<H>  05-22    PT/INR - ( 22 May 2020 08:45 )   PT: 22.4 sec;   INR: 1.94 ratio         PTT - ( 21 May 2020 09:06 )  PTT:52.2 sec    CAPILLARY BLOOD GLUCOSE            RADIOLOGY & ADDITIONAL TESTS:    Imaging  Reviewed:  YES/NO    Consultant(s) Notes Reviewed:   YES/ No      Plan of care was discussed with patient and /or primary care giver; all questions and concerns were addressed

## 2020-05-22 NOTE — PROGRESS NOTE ADULT - PROBLEM SELECTOR PLAN 1
Due to hypoalbuminemia, unclear etiology   normal platelet, not hepatic in origin   S/P paracentesis on 5/21   1.7L removed  Continue IV lasix (held this AM for SBP < 100) Due to hypoalbuminemia, unclear etiology   normal platelet, not hepatic in origin   S/P paracentesis on 5/21   1.7L removed, fluid accidentaly discarded, no labs sent   Will give additional albumin and IV lasix today   standing lasix changed to PO to start tomorrow

## 2020-05-22 NOTE — DISCHARGE NOTE PROVIDER - NSDCCPCAREPLAN_GEN_ALL_CORE_FT
PRINCIPAL DISCHARGE DIAGNOSIS  Diagnosis: Weakness  Assessment and Plan of Treatment: PRINCIPAL DISCHARGE DIAGNOSIS  Diagnosis: Anasarca  Assessment and Plan of Treatment: You have a tendency to accumulate fluid in your abdomen as well as the rest of your body associated with low protein levels. Please continue eating a nutritious diet and continue supplements. Follow up with your primary care doctor within one week. Seek immediate medical attention if you PRINCIPAL DISCHARGE DIAGNOSIS  Diagnosis: Anasarca  Assessment and Plan of Treatment: You have a tendency to accumulate fluid in your abdomen as well as the rest of your body associated with low protein levels. Please continue eating a nutritious diet and continue supplements. Follow up with your primary care doctor within one week. Seek immediate medical attention if you      SECONDARY DISCHARGE DIAGNOSES  Diagnosis: Anemia  Assessment and Plan of Treatment: Monitor hemoglobin   Follow up with PCP

## 2020-05-22 NOTE — PROGRESS NOTE ADULT - ATTENDING COMMENTS
Agree with above.  No nausea or vomiting  Passing flatus and BM  No abdominal pain  No evidence of bowel obstruction  Plan as per Medicine
pt was seen and examined by me on 5/23/2020  dw pt in length  also dw np and hospital staff in length   dieatery and socoial help needed

## 2020-05-22 NOTE — DISCHARGE NOTE PROVIDER - NSDCMRMEDTOKEN_GEN_ALL_CORE_FT
docusate sodium 100 mg oral tablet: 1 tab(s) orally 2 times a day  ferrous sulfate 325 mg (65 mg elemental iron) oral tablet: 1 tab(s) orally once a day  folic acid 0.4 mg oral tablet: 1 tab(s) orally once a day  Lasix 40 mg oral tablet: 1 tab(s) orally once a day  pantoprazole 40 mg oral delayed release tablet: 1 tab(s) orally once a day  raNITIdine 150 mg oral capsule: 1 cap(s) orally once a day  senna oral tablet: 2 tab(s) orally once a day (at bedtime)  Xarelto 15 mg oral tablet: 1 tab(s) orally once a day (in the evening) docusate sodium 100 mg oral tablet: 1 tab(s) orally 2 times a day  Lasix 40 mg oral tablet: 1 tab(s) orally once a day  pantoprazole 40 mg oral delayed release tablet: 1 tab(s) orally once a day  senna oral tablet: 2 tab(s) orally once a day (at bedtime)  Xarelto 15 mg oral tablet: 1 tab(s) orally once a day (in the evening)

## 2020-05-23 NOTE — CHART NOTE - NSCHARTNOTEFT_GEN_A_CORE
HPI: 64 year old female from home walks with cane, with PMH of recurrent SBO's, s/p exp lap, SB resection in 2015, ex lap, ALBINA in 2018, DVT, PE, on Xarelto, IVC filter, chronic leg swelling, and anasarca, came in to the ED after being sent by PCP, Dr. Ross for generalized weakness and increased in abdominal girth. Patient admitted with anasarca with increased asci ties along with severe hypoalbuminemia. CT abdomen showed colitis and concern for SBO. Initally treated with antibiotics, dc'd per ID. S/P paracentesis in IR on 5/21.     Interim Update: Patient hypotensive overnight. Lasix IV held. Continue with PO lasix in accordance with plan as stated in Dr. Ross's progress note 5/23. Plan discussed with Dr. Ross and Dr. Rivera ([facility]eme). Per plan, ordered 2 units PRBC for low Hgb and 2.5mg Vit K for increase in coags, continue with xarelto per Dr. Rivera, repleted phosphate and calcium for hypocalcemia and hypophosphatemia.    INTERVAL HPI/OVERNIGHT EVENTS:  T(C): 36.3 (05-23-20 @ 05:05), Max: 36.7 (05-22-20 @ 13:18)  HR: 80 (05-23-20 @ 05:05) (76 - 84)  BP: 92/67 (05-23-20 @ 05:05) (90/59 - 105/72)  RR: 16 (05-23-20 @ 05:05) (16 - 17)  SpO2: 98% (05-23-20 @ 05:05) (98% - 100%)  Wt(kg): --  I&O's Summary      LABS:                        7.1    4.21  )-----------( 93       ( 23 May 2020 07:02 )             23.3     05-23    145  |  116<H>  |  4<L>  ----------------------------<  97  3.8   |  23  |  0.63    Ca    6.6<L>      23 May 2020 07:02  Phos  1.5     05-23  Mg     1.7     05-23    TPro  4.4<L>  /  Alb  1.7<L>  /  TBili  2.3<H>  /  DBili  x   /  AST  70<H>  /  ALT  71<H>  /  AlkPhos  130<H>  05-22    PT/INR - ( 23 May 2020 07:02 )   PT: 42.8 sec;   INR: 3.64 ratio         PTT - ( 23 May 2020 07:02 )  PTT:48.2 sec    CAPILLARY BLOOD GLUCOSE      POCT Blood Glucose.: 139 mg/dL (22 May 2020 14:56)              MEDICATIONS  (STANDING):  furosemide    Tablet 20 milliGRAM(s) Oral daily  pantoprazole    Tablet 40 milliGRAM(s) Oral before breakfast  rivaroxaban 20 milliGRAM(s) Oral with dinner    MEDICATIONS  (PRN):      RADIOLOGY & ADDITIONAL TESTS:    Imaging Personally Reviewed:  [ ] YES  [ ] NO    REVIEW OF SYSTEMS:  CONSTITUTIONAL: No fever, weight loss, or fatigue  EYES: No eye pain, visual disturbances, or discharge  ENMT:  No difficulty hearing, tinnitus, vertigo; No sinus or throat pain  NECK: No pain or stiffness  BREASTS: No pain, masses, or nipple discharge  RESPIRATORY: No cough, wheezing, chills or hemoptysis; No shortness of breath  CARDIOVASCULAR: No chest pain, palpitations, dizziness, or leg swelling  GASTROINTESTINAL: No abdominal or epigastric pain. No nausea, vomiting, or hematemesis; No diarrhea or constipation. No melena or hematochezia.  GENITOURINARY: No dysuria, frequency, hematuria, or incontinence  NEUROLOGICAL: No headaches, memory loss, loss of strength, numbness, or tremors  SKIN: No itching, burning, rashes, or lesions   LYMPH NODES: No enlarged glands  ENDOCRINE: No heat or cold intolerance; No hair loss  MUSCULOSKELETAL: No joint pain or swelling; No muscle, back, or extremity pain  PSYCHIATRIC: No depression, anxiety, mood swings, or difficulty sleeping  HEME/LYMPH: No easy bruising, or bleeding gums  ALLERY AND IMMUNOLOGIC: No hives or eczema      Consultant(s) Notes Reviewed:  [ x ] YES  [ ] NO    PHYSICAL EXAM:  GENERAL: NAD, well-groomed, well-developed  HEAD:  Atraumatic, Normocephalic  EYES: EOMI, PERRLA, conjunctiva and sclera clear  ENMT: No tonsillar erythema, exudates, or enlargement; Moist mucous membranes, Good dentition, No lesions  NECK: Supple, No JVD, Normal thyroid  NERVOUS SYSTEM:  Alert & Oriented X3, Good concentration; Motor Strength 5/5 B/L upper and lower extremities; DTRs 2+ intact and symmetric  CHEST/LUNG: Clear to percussion bilaterally; No rales, rhonchi, wheezing, or rubs  HEART: Regular rate and rhythm; No murmurs, rubs, or gallops  ABDOMEN: Soft, Nontender, Nondistended; Bowel sounds present  EXTREMITIES:  2+ Peripheral Pulses, No clubbing, cyanosis, or edema  LYMPH: No lymphadenopathy noted  SKIN: No rashes or lesions    Care Discussed with Consultants/Other Providers [ x] YES  [ ] NO

## 2020-05-23 NOTE — PROGRESS NOTE ADULT - SUBJECTIVE AND OBJECTIVE BOX
Patient was seen and examined  Patient is a 64y old  Female who presents with a chief complaint of Swelling, weakness (22 May 2020 17:21)      INTERVAL HPI/OVERNIGHT EVENTS:  T(C): 36.3 (05-23-20 @ 05:05), Max: 36.7 (05-22-20 @ 13:18)  HR: 80 (05-23-20 @ 05:05) (76 - 84)  BP: 92/67 (05-23-20 @ 05:05) (90/59 - 105/72)  RR: 16 (05-23-20 @ 05:05) (16 - 17)  SpO2: 98% (05-23-20 @ 05:05) (98% - 100%)  Wt(kg): --  I&O's Summary      LABS:                        7.1    4.21  )-----------( 93       ( 23 May 2020 07:02 )             23.3     05-23    145  |  116<H>  |  4<L>  ----------------------------<  97  3.8   |  23  |  0.63    Ca    6.6<L>      23 May 2020 07:02  Phos  1.5     05-23  Mg     1.7     05-23    TPro  4.4<L>  /  Alb  1.7<L>  /  TBili  2.3<H>  /  DBili  x   /  AST  70<H>  /  ALT  71<H>  /  AlkPhos  130<H>  05-22    PT/INR - ( 23 May 2020 07:02 )   PT: 42.8 sec;   INR: 3.64 ratio         PTT - ( 23 May 2020 07:02 )  PTT:48.2 sec    CAPILLARY BLOOD GLUCOSE      POCT Blood Glucose.: 139 mg/dL (22 May 2020 14:56)              MEDICATIONS  (STANDING):  furosemide    Tablet 20 milliGRAM(s) Oral daily  pantoprazole    Tablet 40 milliGRAM(s) Oral before breakfast  rivaroxaban 20 milliGRAM(s) Oral with dinner    MEDICATIONS  (PRN):      RADIOLOGY & ADDITIONAL TESTS:    Imaging Personally Reviewed:  [ ] YES  [ ] NO    REVIEW OF SYSTEMS:  CONSTITUTIONAL: No fever, weight loss, or fatigue  EYES: No eye pain, visual disturbances, or discharge  ENMT:  No difficulty hearing, tinnitus, vertigo; No sinus or throat pain  NECK: No pain or stiffness  BREASTS: No pain, masses, or nipple discharge  RESPIRATORY: No cough, wheezing, chills or hemoptysis; No shortness of breath  CARDIOVASCULAR: No chest pain, palpitations, dizziness, or leg swelling  GASTROINTESTINAL: No abdominal or epigastric pain. No nausea, vomiting, or hematemesis; No diarrhea or constipation. No melena or hematochezia.  GENITOURINARY: No dysuria, frequency, hematuria, or incontinence  NEUROLOGICAL: No headaches, memory loss, loss of strength, numbness, or tremors  SKIN: No itching, burning, rashes, or lesions   LYMPH NODES: No enlarged glands  ENDOCRINE: No heat or cold intolerance; No hair loss  MUSCULOSKELETAL: No joint pain or swelling; No muscle, back, or extremity pain  PSYCHIATRIC: No depression, anxiety, mood swings, or difficulty sleeping  HEME/LYMPH: No easy bruising, or bleeding gums  ALLERY AND IMMUNOLOGIC: No hives or eczema      Consultant(s) Notes Reviewed:  [ x ] YES  [ ] NO    PHYSICAL EXAM:  GENERAL: NAD, well-groomed, well-developed  HEAD:  Atraumatic, Normocephalic  EYES: EOMI, PERRLA, conjunctiva and sclera clear  ENMT: No tonsillar erythema, exudates, or enlargement; Moist mucous membranes, Good dentition, No lesions  NECK: Supple, No JVD, Normal thyroid  NERVOUS SYSTEM:  Alert & Oriented X3, Good concentration; Motor Strength 5/5 B/L upper and lower extremities; DTRs 2+ intact and symmetric  CHEST/LUNG: Clear to percussion bilaterally; No rales, rhonchi, wheezing, or rubs  HEART: Regular rate and rhythm; No murmurs, rubs, or gallops  ABDOMEN: Soft, Nontender, Nondistended; Bowel sounds present  EXTREMITIES:  2+ Peripheral Pulses, No clubbing, cyanosis, or edema  LYMPH: No lymphadenopathy noted  SKIN: No rashes or lesions    Care Discussed with Consultants/Other Providers [ x] YES  [ ] NO

## 2020-05-23 NOTE — PROGRESS NOTE ADULT - PROBLEM SELECTOR PLAN 1
Due to hypoalbuminemia, unclear etiology   normal platelet, not hepatic in origin   S/P paracentesis on 5/21   1.7L removed, fluid accidentaly discarded, no labs sent   Will give additional albumin and IV lasix today   standing lasix changed to PO to start today

## 2020-05-24 NOTE — PROGRESS NOTE ADULT - SUBJECTIVE AND OBJECTIVE BOX
Patient was seen and examined  Patient is a 64y old  Female who presents with a chief complaint of Swelling, weakness (23 May 2020 09:48)      INTERVAL HPI/OVERNIGHT EVENTS:  T(C): 36.3 (05-24-20 @ 05:35), Max: 36.9 (05-23-20 @ 14:40)  HR: 74 (05-24-20 @ 05:35) (74 - 94)  BP: 94/62 (05-24-20 @ 05:35) (94/62 - 105/79)  RR: 17 (05-24-20 @ 05:35) (16 - 17)  SpO2: 97% (05-24-20 @ 05:35) (97% - 100%)  Wt(kg): --  I&O's Summary      LABS:                        11.2   5.26  )-----------( 87       ( 24 May 2020 07:02 )             35.0     05-24    145  |  116<H>  |  4<L>  ----------------------------<  80  4.0   |  23  |  0.49<L>    Ca    7.1<L>      24 May 2020 07:02  Phos  1.6     05-24  Mg     1.7     05-24      PT/INR - ( 23 May 2020 07:02 )   PT: 42.8 sec;   INR: 3.64 ratio         PTT - ( 23 May 2020 07:02 )  PTT:48.2 sec    CAPILLARY BLOOD GLUCOSE                  MEDICATIONS  (STANDING):  furosemide    Tablet 20 milliGRAM(s) Oral daily  pantoprazole    Tablet 40 milliGRAM(s) Oral before breakfast  potassium phosphate / sodium phosphate powder 1 Packet(s) Oral three times a day with meals  potassium phosphate IVPB 15 milliMole(s) IV Intermittent once  rivaroxaban 20 milliGRAM(s) Oral with dinner    MEDICATIONS  (PRN):      RADIOLOGY & ADDITIONAL TESTS:    Imaging Personally Reviewed:  [ ] YES  [ ] NO    REVIEW OF SYSTEMS:  CONSTITUTIONAL: No fever, weight loss, or fatigue  EYES: No eye pain, visual disturbances, or discharge  ENMT:  No difficulty hearing, tinnitus, vertigo; No sinus or throat pain  NECK: No pain or stiffness  BREASTS: No pain, masses, or nipple discharge  RESPIRATORY: No cough, wheezing, chills or hemoptysis; No shortness of breath  CARDIOVASCULAR: No chest pain, palpitations, dizziness, or leg swelling  GASTROINTESTINAL: No abdominal or epigastric pain. No nausea, vomiting, or hematemesis; No diarrhea or constipation. No melena or hematochezia.  GENITOURINARY: No dysuria, frequency, hematuria, or incontinence  NEUROLOGICAL: No headaches, memory loss, loss of strength, numbness, or tremors  SKIN: No itching, burning, rashes, or lesions   LYMPH NODES: No enlarged glands  ENDOCRINE: No heat or cold intolerance; No hair loss  MUSCULOSKELETAL: No joint pain or swelling; No muscle, back, or extremity pain  PSYCHIATRIC: No depression, anxiety, mood swings, or difficulty sleeping  HEME/LYMPH: No easy bruising, or bleeding gums  ALLERY AND IMMUNOLOGIC: No hives or eczema      Consultant(s) Notes Reviewed:  [ x ] YES  [ ] NO    PHYSICAL EXAM:  GENERAL: NAD, well-groomed, well-developed  HEAD:  Atraumatic, Normocephalic  EYES: EOMI, PERRLA, conjunctiva and sclera clear  ENMT: No tonsillar erythema, exudates, or enlargement; Moist mucous membranes, Good dentition, No lesions  NECK: Supple, No JVD, Normal thyroid  NERVOUS SYSTEM:  Alert & Oriented X3, Good concentration; Motor Strength 5/5 B/L upper and lower extremities; DTRs 2+ intact and symmetric  CHEST/LUNG: Clear to percussion bilaterally; No rales, rhonchi, wheezing, or rubs  HEART: Regular rate and rhythm; No murmurs, rubs, or gallops  ABDOMEN: Soft, Nontender, Nondistended; Bowel sounds present  EXTREMITIES:  2+ Peripheral Pulses, No clubbing, cyanosis, or edema  LYMPH: No lymphadenopathy noted  SKIN: No rashes or lesions    Care Discussed with Consultants/Other Providers [ x] YES  [ ] NO

## 2020-05-24 NOTE — CHART NOTE - NSCHARTNOTEFT_GEN_A_CORE
EVENT:   - HPI: 64 year old female from home walks with cane, with PMH of recurrent SBO's, s/p exp lap, SB resection in 2015, ex lap, ALBINA in 2018, DVT, PE, on Xarelto, IVC filter, chronic leg swelling, and anasarca, came in to the ED after being sent by PCP, Dr. Ross for generalized weakness and increased in abdominal girth. Patient admitted with anasarca with increased asci ties along with severe hypoalbuminemia. CT abdomen showed colitis and concern for SBO. Initially treated with antibiotics, dc'd per ID. S/P paracentesis in IR on 5/21.     - As per RN pt is c/o of generalized abdominal pain which she rated as a 5/10. Denied any other symptoms.      OBJECTIVE:  Vital Signs Last 24 Hrs  T(C): 36.3 (23 May 2020 20:28), Max: 36.9 (23 May 2020 14:40)  T(F): 97.3 (23 May 2020 20:28), Max: 98.5 (23 May 2020 14:40)  HR: 84 (23 May 2020 20:28) (80 - 94)  BP: 105/79 (23 May 2020 20:28) (92/67 - 105/79)  BP(mean): --  RR: 17 (23 May 2020 20:28) (16 - 17)  SpO2: 99% (23 May 2020 20:28) (98% - 100%)    FOCUSED PHYSICAL EXAM:  Neuro: awake, alert, oriented x 3. No neuro deficit  Cardiovascular: Pulses +2 B/L in lower and upper extremities, HR regular, BP stable, No edema.  Respiratory: Respirations regular, unlabored, breath sounds clear B/L.   GI: Abdomen soft, non-tender, positive bowel sounds.  : no bladder distention noted. No complaints at this time.  Skin: Dry, intact, no bruising, no diaphoresis.    LABS:                        7.1    4.21  )-----------( 93       ( 23 May 2020 07:02 )             23.3     05-23    145  |  116<H>  |  4<L>  ----------------------------<  97  3.8   |  23  |  0.63    Ca    6.6<L>      23 May 2020 07:02  Phos  1.5     05-23  Mg     1.7     05-23    TPro  4.4<L>  /  Alb  1.7<L>  /  TBili  2.3<H>  /  DBili  x   /  AST  70<H>  /  ALT  71<H>  /  AlkPhos  130<H>  05-22      Assessment/Problem: Abdominal pain    PLAN:   1. Tylenol 650mg, PO x 1 dose ordered  2. Cont present treatment

## 2020-05-25 NOTE — PROGRESS NOTE ADULT - SUBJECTIVE AND OBJECTIVE BOX
Patient was seen and examined  Patient is a 64y old  Female who presents with a chief complaint of Swelling, weakness (24 May 2020 09:54)      INTERVAL HPI/OVERNIGHT EVENTS:  T(C): 36.6 (05-25-20 @ 05:03), Max: 36.6 (05-24-20 @ 21:18)  HR: 87 (05-25-20 @ 05:03) (84 - 90)  BP: 97/68 (05-25-20 @ 05:03) (97/68 - 105/72)  RR: 17 (05-25-20 @ 05:03) (17 - 17)  SpO2: 97% (05-25-20 @ 05:03) (97% - 99%)  Wt(kg): --  I&O's Summary      LABS:                        11.5   4.74  )-----------( 69       ( 25 May 2020 06:47 )             35.6     05-25    146<H>  |  118<H>  |  3<L>  ----------------------------<  73  5.3   |  27  |  0.51    Ca    7.3<L>      25 May 2020 06:47  Phos  1.8     05-25  Mg     1.6     05-25      PT/INR - ( 25 May 2020 06:47 )   PT: 23.1 sec;   INR: 2.00 ratio         PTT - ( 25 May 2020 06:47 )  PTT:36.1 sec    CAPILLARY BLOOD GLUCOSE                  MEDICATIONS  (STANDING):  furosemide    Tablet 20 milliGRAM(s) Oral daily  pantoprazole    Tablet 40 milliGRAM(s) Oral before breakfast  potassium phosphate / sodium phosphate powder 1 Packet(s) Oral three times a day with meals  rivaroxaban 20 milliGRAM(s) Oral with dinner    MEDICATIONS  (PRN):      RADIOLOGY & ADDITIONAL TESTS:    Imaging Personally Reviewed:  [ ] YES  [ ] NO    REVIEW OF SYSTEMS:  CONSTITUTIONAL: No fever, weight loss, or fatigue  EYES: No eye pain, visual disturbances, or discharge  ENMT:  No difficulty hearing, tinnitus, vertigo; No sinus or throat pain  NECK: No pain or stiffness  BREASTS: No pain, masses, or nipple discharge  RESPIRATORY: No cough, wheezing, chills or hemoptysis; No shortness of breath  CARDIOVASCULAR: No chest pain, palpitations, dizziness, or leg swelling  GASTROINTESTINAL: No abdominal or epigastric pain. No nausea, vomiting, or hematemesis; No diarrhea or constipation. No melena or hematochezia.  GENITOURINARY: No dysuria, frequency, hematuria, or incontinence  NEUROLOGICAL: No headaches, memory loss, loss of strength, numbness, or tremors  SKIN: No itching, burning, rashes, or lesions   LYMPH NODES: No enlarged glands  ENDOCRINE: No heat or cold intolerance; No hair loss  MUSCULOSKELETAL: No joint pain or swelling; No muscle, back, or extremity pain  PSYCHIATRIC: No depression, anxiety, mood swings, or difficulty sleeping  HEME/LYMPH: No easy bruising, or bleeding gums  ALLERY AND IMMUNOLOGIC: No hives or eczema      Consultant(s) Notes Reviewed:  [ x ] YES  [ ] NO    PHYSICAL EXAM:  GENERAL: NAD, well-groomed, well-developed  HEAD:  Atraumatic, Normocephalic  EYES: EOMI, PERRLA, conjunctiva and sclera clear  ENMT: No tonsillar erythema, exudates, or enlargement; Moist mucous membranes, Good dentition, No lesions  NECK: Supple, No JVD, Normal thyroid  NERVOUS SYSTEM:  Alert & Oriented X3, Good concentration; Motor Strength 5/5 B/L upper and lower extremities; DTRs 2+ intact and symmetric  CHEST/LUNG: Clear to percussion bilaterally; No rales, rhonchi, wheezing, or rubs  HEART: Regular rate and rhythm; No murmurs, rubs, or gallops  ABDOMEN: Soft, Nontender, Nondistended; Bowel sounds present  EXTREMITIES:  2+ Peripheral Pulses, No clubbing, cyanosis, or edema  LYMPH: No lymphadenopathy noted  SKIN: No rashes or lesions    Care Discussed with Consultants/Other Providers [ x] YES  [ ] NO

## 2020-05-26 NOTE — CONSULT NOTE ADULT - ASSESSMENT
64 year old lady with short bowel syndrome and patial SBO was admitted for ascites, swelling and low albumin.  She had recurrent DVT on xarelto.  Her PT iNR went up to 3.6 and corrected with VitK partially.  her platelet dropped to 60 and she required transfusion.

## 2020-05-26 NOTE — PROGRESS NOTE ADULT - PROBLEM SELECTOR PLAN 10
PT - JOHNATHAN  Pt choose rehab, CM is following   waiting for Auth
full code

## 2020-05-26 NOTE — PROGRESS NOTE ADULT - SUBJECTIVE AND OBJECTIVE BOX
NP Note discussed with  Primary Attending    Patient is a 64y old  Female who presents with a chief complaint of Swelling, weakness (25 May 2020 07:38)    HPI -64 year old female from home walks with cane, with PMH of recurrent SBO's, s/p exp lap, SB resection in 2015, ex lap, ALBINA in 2018, DVT, PE, on Xarelto, IVC filter, chronic leg swelling, and anasarca, came in to the ED after being sent by PCP, Dr. Ross for generalized weakness and increased in abdominal girth. Patient admitted with anasarca with increased asci ties along with severe hypoalbuminemia. CT abdomen showed colitis and concern for SBO. Initially treated with antibiotics, dc'd per ID. S/P paracentesis in IR on 5/21. However, accidentally discard fluids so unable to do serology test.   Pt is stable medically for discharge. Waiting for placement.        INTERVAL HPI/OVERNIGHT EVENTS: no new complaints    MEDICATIONS  (STANDING):  furosemide    Tablet 20 milliGRAM(s) Oral daily  pantoprazole    Tablet 40 milliGRAM(s) Oral before breakfast  potassium phosphate / sodium phosphate powder 1 Packet(s) Oral two times a day  rivaroxaban 20 milliGRAM(s) Oral with dinner    MEDICATIONS  (PRN):      __________________________________________________  REVIEW OF SYSTEMS:    CONSTITUTIONAL: No fever,   EYES: no acute visual disturbances  NECK: No pain or stiffness  RESPIRATORY: No cough; No shortness of breath  CARDIOVASCULAR: No chest pain, no palpitations  GASTROINTESTINAL: No pain. No nausea or vomiting; No diarrhea   NEUROLOGICAL: No headache or numbness, no tremors  MUSCULOSKELETAL: No joint pain, no muscle pain  GENITOURINARY: no dysuria, no frequency, no hesitancy  PSYCHIATRY: no depression , no anxiety  ALL OTHER  ROS negative        Vital Signs Last 24 Hrs  T(C): 36.8 (26 May 2020 12:40), Max: 36.8 (26 May 2020 12:40)  T(F): 98.3 (26 May 2020 12:40), Max: 98.3 (26 May 2020 12:40)  HR: 90 (26 May 2020 12:40) (76 - 90)  BP: 99/69 (26 May 2020 12:40) (88/60 - 109/78)  BP(mean): --  RR: 18 (26 May 2020 12:40) (17 - 18)  SpO2: 99% (26 May 2020 12:40) (98% - 100%)    ________________________________________________  PHYSICAL EXAM:  GENERAL: NAD  HEENT: Normocephalic;  conjunctivae and sclerae clear; moist mucous membranes;   NECK : supple  CHEST/LUNG: Clear to auscultation bilaterally with good air entry   HEART: S1 S2  regular; no murmurs, gallops or rubs  ABDOMEN: Soft, Nontender, Nondistended; Bowel sounds present  EXTREMITIES:  extremities edema improved.   SKIN: warm and dry; no rash  NERVOUS SYSTEM:  Awake and alert; Oriented  to place, person and time ; no new deficits    _________________________________________________  LABS:                        10.8   5.04  )-----------( 83       ( 26 May 2020 06:00 )             35.2     05-26    145  |  114<H>  |  5<L>  ----------------------------<  67<L>  4.9   |  28  |  0.56    Ca    7.7<L>      26 May 2020 06:00  Phos  2.3     05-26  Mg     1.8     05-26      PT/INR - ( 25 May 2020 06:47 )   PT: 23.1 sec;   INR: 2.00 ratio         PTT - ( 25 May 2020 06:47 )  PTT:36.1 sec    CAPILLARY BLOOD GLUCOSE            RADIOLOGY & ADDITIONAL TESTS:    < from: Xray Chest 1 View-PORTABLE IMMEDIATE (05.18.20 @ 23:44) >  EXAM:  XR CHEST PORTABLE IMMED 1V                            PROCEDURE DATE:  05/18/2020          INTERPRETATION:    Examination: XR CHEST IMMEDIATE    History: ADMDIAG1: R53.1 WEAKNESS/, admission covid r/o    Comparison: 1/24/2020    Findings:  Normal heart size. Small left base infiltrate or effusion or combination of both. No pneumothorax or right effusion.    Impression:  1.  Small left infiltrate/effusion.      DISCRETE X-RAY DATA:  Percent of LEFT lung opacification: 1-33%  Percent of RIGHT lung opacification: Clear  Change in lung opacification from most recent x-ray (<=3 days): No Prior  Change from prior dated 3 or more days (same admission): No Prior                  BERONICA ROSAS M.D., ATTENDING RADIOLOGIST  This document hasbeen electronically signed. May 19 2020  7:19AM                < end of copied text >    < from: CT Abdomen and Pelvis w/ IV Cont (05.19.20 @ 00:16) >    EXAM:  CT ABDOMEN AND PELVIS IC                            PROCEDURE DATE:  05/19/2020          INTERPRETATION:  CT ABDOMEN AND PELVIS WITH CONTRAST    INDICATION: Abdominal swelling. Increased abdominal girth.    TECHNIQUE: Contrast enhanced CT ofthe abdomen and pelvis. Images are reformatted in the sagittal and coronal planes.    90 mL of Omnipaque 350 contrast material was injected IV.    COMPARISON: CT abdomen pelvis 3/10/2020.    FINDINGS:    Lower Thorax: Small bilateral pleural effusionwith adjacent bibasilar opacities, likely represent dependent atelectasis. Recommend clinical correlation to assess for superimposed infection.    Liver: Steatosis.  Biliary: No significant dilatation. Cholelithiasis.  Spleen: No suspicious lesions.  Pancreas: No inflammatory changes or ductal dilatation.  Adrenals: Normal.  Kidneys: No hydronephrosis. 5 mm right renal angiomyolipoma.  Vessels: Mild atherosclerotic disease of the aorta and its branches. Celiac, SMA and KINA axes appears patent. Stable appearance of IVC filter.    GI tract: There is multisegmental wall thickening of small and large bowel loops with hyperemia, most pronounced at the level of jejunal ileal folds, concerning for enterocolitis, likely infectious or inflammatory nature. Small bowel anastomosis revisualized in the anterior mid abdomen with focal dilatation of bowel loops to 4.3 cm. Additional focally dilated fecalized jejunal loop measuring up to 4 cm in the left side of abdomen with suggestion of transition point in the left upper abdomen near surgical anastomosis images 62 through 66 of series 2 raising possibility of ileus or at least partial small bowel obstruction.    Peritoneum/retroperitoneum and mesentery: Large ascites, increased compared to the prior study. No free air. No organized fluid collection. No bulky adenopathy.    Pelvic organs/Bladder: No pelvic masses. Bladder is normal.    Abdominal wall: Diffuse anasarca.  Bones and soft tissues: Multilevel degenerative changes of the spine are noted with patchy osteopenia, limiting detailed evaluation.     IMPRESSION:    Multisegmental wall thickening of small and large bowel loops with hyperemia, most pronounced at the level of jejunal ileal folds, concerning for enterocolitis, likely infectious or inflammatory nature.     Small bowel anastomosis revisualized in the anterior mid abdomen with focal dilatation of bowel loops to 4.3 cm. Additional focally dilated fecalized jejunal loop measuring up to 4 cm in the left side of abdomen with suggestion of transition point in the left upper abdomen near surgical anastomosis, raising possibility of ileus or at least partial small bowel obstruction. No pneumatosis or findings of portal venous gas to suggest bowel ischemia. Correlate with lactic acid.    Large ascites, increased compared to the prior study.    Cholelithiasis.    Small bilateral pleural effusion with adjacent bibasilar opacities, likely represent dependent atelectasis. Recommend clinical correlation to assess for superimposed infection.                  EVGENY PANDA M.D., ATTENDING RADIOLOGIST  This document has been electronically signed. May 19 2020  1:29AM                < end of copied text >    Imaging Personally Reviewed:  YES  Consultant(s) Notes Reviewed:   YES    Care Discussed with Consultants : GI   Plan of care was discussed with patient and /or primary care giver; all questions and concerns were addressed and care was aligned with patient's wishes.

## 2020-05-26 NOTE — PROGRESS NOTE ADULT - PROBLEM SELECTOR PLAN 1
Due to hypoalbuminemia, unclear etiology   S/P paracentesis on 5/21   1.7L removed, fluid accidentally  discarded, no labs sent --- GI Dr. Felix cannot give any GI recommendations without serology results. Discussed with Dr. Ross, no tap during this admission. Pt is stable for discharge without 2nd paracentesis  improving  switched to P.O lasix 20mg P.O daily

## 2020-05-26 NOTE — PROGRESS NOTE ADULT - PROBLEM SELECTOR PROBLEM 10
Discharge planning issues
Goals of care, counseling/discussion

## 2020-05-26 NOTE — CHART NOTE - NSCHARTNOTEFT_GEN_A_CORE
EVENT:   - HPI: 64 year old female from home walks with cane, with PMH of recurrent SBO's, s/p exp lap, SB resection in 2015, ex lap, ALBINA in 2018, DVT, PE, on Xarelto, IVC filter, chronic leg swelling, and anasarca, came in to the ED after being sent by PCP, Dr. Ross for generalized weakness and increased in abdominal girth for last few days. Patient reports the abdominal girth is bothering her and she has noticed increased swelling in her belly and her legs.   Patient denies any any chest pain, palpitations abdominal pain, nausea, vomiting, change in urinary or bowel habits.  Patient is being admitted with anasarca with increased asci ties along with severe hypoalbuminemia. 1 dose of albumin given. Will start on IV lasix 20 mg once daily. CT abdomen showed colitis and concern for SBO. Will start on IV cipro and IV flagyl. surgery consulted.    - Received text page from RN that pt is c/o of left lower abd pain which she rated as a 5/10. Denied any other symptoms.    OBJECTIVE:  Vital Signs Last 24 Hrs  T(C): 36.6 (25 May 2020 21:36), Max: 36.6 (25 May 2020 05:03)  T(F): 97.9 (25 May 2020 21:36), Max: 97.9 (25 May 2020 21:36)  HR: 88 (25 May 2020 21:36) (87 - 90)  BP: 100/68 (25 May 2020 21:36) (97/68 - 109/78)  BP(mean): --  RR: 18 (25 May 2020 21:36) (17 - 18)  SpO2: 99% (25 May 2020 21:36) (97% - 99%)    FOCUSED PHYSICAL EXAM:  Neuro: awake, alert, oriented x 3. No neuro deficit  Cardiovascular: Pulses +2 B/L in lower and upper extremities, HR regular, BP stable, No edema.  Respiratory: Respirations regular, unlabored, breath sounds clear B/L.   GI: Abdomen soft, + pain on LLQ with deep palpation, positive bowel sounds.  : no bladder distention noted. No complaints at this time.  Skin: Dry, intact, no bruising, no diaphoresis.    LABS:                        11.5   4.74  )-----------( 69       ( 25 May 2020 06:47 )             35.6     05-25    146<H>  |  118<H>  |  3<L>  ----------------------------<  73  5.3   |  27  |  0.51    Ca    7.3<L>      25 May 2020 06:47  Phos  1.8     05-25  Mg     1.6     05-25    Assessment/Problem: Left lower abdominal pain    PLAN:   1. Tylenol 2 tabs, PO x 1 dose ordered  2. Cont present care

## 2020-05-26 NOTE — CONSULT NOTE ADULT - PROBLEM SELECTOR RECOMMENDATION 2
?GIB  EGD abd colonoscopy were neg  she has very poor nutrition, ferritin is normal  it can be from nutrition def

## 2020-05-26 NOTE — CONSULT NOTE ADULT - SUBJECTIVE AND OBJECTIVE BOX
Patient is a 64y old  Female who presents with a chief complaint of Swelling, weakness (26 May 2020 14:01)      HPI:  64 year old female from home walks with cane, with PMH of recurrent SBO's, s/p exp lap, SB resection in 2015, ex lap, ALBINA in 2018, DVT, PE, on Xarelto, IVC filter, chronic leg swelling, and anasarca, came in to the ED after being sent by PCP, Dr. Ross for generalized weakness and increased in abdominal girth for last few days. Patient reports the abdominal girth is bothering her and she has noticed increased swelling in her belly and her legs.   Patient denies any any chest pain, palpitations abdominal pain, nausea, vomiting, change in urinary or bowel habits.  she has been on xarelto.  The PT/PTT went up.  the anemia got worse and the platelet went down also.       ROS:  Negative except for:    PAST MEDICAL & SURGICAL HISTORY:  Anasarca  Pulmonary embolism  DVT (deep venous thrombosis)  SBO (small bowel obstruction)  H/O exploratory laparotomy      SOCIAL HISTORY:    FAMILY HISTORY:  No pertinent family history in first degree relatives      MEDICATIONS  (STANDING):  furosemide    Tablet 20 milliGRAM(s) Oral daily  pantoprazole    Tablet 40 milliGRAM(s) Oral before breakfast  potassium phosphate / sodium phosphate powder 1 Packet(s) Oral two times a day  rivaroxaban 20 milliGRAM(s) Oral with dinner    MEDICATIONS  (PRN):      Allergies    No Known Allergies    Intolerances        Vital Signs Last 24 Hrs  T(C): 36.8 (26 May 2020 12:40), Max: 36.8 (26 May 2020 12:40)  T(F): 98.3 (26 May 2020 12:40), Max: 98.3 (26 May 2020 12:40)  HR: 90 (26 May 2020 12:40) (76 - 90)  BP: 99/69 (26 May 2020 12:40) (88/60 - 100/68)  BP(mean): --  RR: 18 (26 May 2020 12:40) (17 - 18)  SpO2: 99% (26 May 2020 12:40) (98% - 100%)    PHYSICAL EXAM  General: adult in NAD  HEENT: clear oropharynx, anicteric sclera, pink conjunctiva  Neck: supple  CV: normal S1/S2 with no murmur rubs or gallops  Lungs: positive air movement b/l ant lungs,clear to auscultation, no wheezes, no rales  Abdomen: soft non-tender non-distended, no hepatosplenomegaly  Ext: no clubbing cyanosis or edema  Skin: no rashes and no petechiae  Neuro: alert and oriented X 4, no focal deficits      LABS:                          10.8   5.04  )-----------( 83       ( 26 May 2020 06:00 )             35.2         Mean Cell Volume : 93.6 fl  Mean Cell Hemoglobin : 28.7 pg  Mean Cell Hemoglobin Concentration : 30.7 gm/dL  Auto Neutrophil # : x  Auto Lymphocyte # : x  Auto Monocyte # : x  Auto Eosinophil # : x  Auto Basophil # : x  Auto Neutrophil % : x  Auto Lymphocyte % : x  Auto Monocyte % : x  Auto Eosinophil % : x  Auto Basophil % : x      Serial CBC's  05-26 @ 06:00  Hct-35.2 / Hgb-10.8 / Plat-83 / RBC-3.76 / WBC-5.04  Serial CBC's  05-25 @ 06:47  Hct-35.6 / Hgb-11.5 / Plat-69 / RBC-3.90 / WBC-4.74  Serial CBC's  05-24 @ 07:02  Hct-35.0 / Hgb-11.2 / Plat-87 / RBC-3.94 / WBC-5.26  Serial CBC's  05-23 @ 07:02  Hct-23.3 / Hgb-7.1 / Plat-93 / RBC-2.50 / WBC-4.21      05-26    145  |  114<H>  |  5<L>  ----------------------------<  67<L>  4.9   |  28  |  0.56    Ca    7.7<L>      26 May 2020 06:00  Phos  2.3     05-26  Mg     1.8     05-26        PT/INR - ( 25 May 2020 06:47 )   PT: 23.1 sec;   INR: 2.00 ratio         PTT - ( 25 May 2020 06:47 )  PTT:36.1 sec    Ferritin, Serum: 212 ng/mL (05-20 @ 14:42)  Iron - Total Binding Capacity.: 92 ug/dL (05-20 @ 06:56)              BLOOD SMEAR INTERPRETATION:       RADIOLOGY & ADDITIONAL STUDIES:  < from: CT Abdomen and Pelvis w/ IV Cont (05.19.20 @ 00:16) >  COMPARISON: CT abdomen pelvis 3/10/2020.    FINDINGS:    Lower Thorax: Small bilateral pleural effusionwith adjacent bibasilar opacities, likely represent dependent atelectasis. Recommend clinical correlation to assess for superimposed infection.    Liver: Steatosis.  Biliary: No significant dilatation. Cholelithiasis.  Spleen: No suspicious lesions.  Pancreas: No inflammatory changes or ductal dilatation.  Adrenals: Normal.  Kidneys: No hydronephrosis. 5 mm right renal angiomyolipoma.  Vessels: Mild atherosclerotic disease of the aorta and its branches. Celiac, SMA and KINA axes appears patent. Stable appearance of IVC filter.    GI tract: There is multisegmental wall thickening of small and large bowel loops with hyperemia, most pronounced at the level of jejunal ileal folds, concerning for enterocolitis, likely infectious or inflammatory nature. Small bowel anastomosis revisualized in the anterior mid abdomen with focal dilatation of bowel loops to 4.3 cm. Additional focally dilated fecalized jejunal loop measuring up to 4 cm in the left side of abdomen with suggestion of transition point in the left upper abdomen near surgical anastomosis images 62 through 66 of series 2 raising possibility of ileus or at least partial small bowel obstruction.    Peritoneum/retroperitoneum and mesentery: Large ascites, increased compared to the prior study. No free air. No organized fluid collection. No bulky adenopathy.    Pelvic organs/Bladder: No pelvic masses. Bladder is normal.    Abdominal wall: Diffuse anasarca.  Bones and soft tissues: Multilevel degenerative changes of the spine are noted with patchy osteopenia, limiting detailed evaluation.     IMPRESSION:    Multisegmental wall thickening of small and large bowel loops with hyperemia, most pronounced at the level of jejunal ileal folds, concerning for enterocolitis, likely infectious or inflammatory nature.     Small bowel anastomosis revisualized in the anterior mid abdomen with focal dilatation of bowel loops to 4.3 cm. Additional focally dilated fecalized jejunal loop measuring up to 4 cm in the left side of abdomen with suggestion of transition point in the left upper abdomen near surgical anastomosis, raising possibility of ileus or at least partial small bowel obstruction. No pneumatosis or findings of portal venous gas to suggest bowel ischemia. Correlate with lactic acid.    Large ascites, increased compared to the prior study.    Cholelithiasis.    < end of copied text >  < from: CT Abdomen and Pelvis w/ IV Cont (05.19.20 @ 00:16) >  Small bilateral pleural effusion with adjacent bibasilar opacities, likely represent dependent atelectasis. Recommend clinical correlation to assess for superimposed infection.    < end of copied text >

## 2020-05-26 NOTE — CONSULT NOTE ADULT - PROBLEM SELECTOR RECOMMENDATION 9
new onset  no sign of infection, no obvious medication  it happened after transfusion  will watch. new onset  no sign of infection, no obvious medication  it started before transfusion  today it is better  will watch.

## 2020-05-27 NOTE — PROGRESS NOTE ADULT - SUBJECTIVE AND OBJECTIVE BOX
NP Note discussed with  Primary Attending    Patient is a 64y old  Female who presents with a chief complaint of Swelling, weakness (26 May 2020 16:27)    HPI -64 year old female from home walks with cane, with PMH of recurrent SBO's, s/p exp lap, SB resection in 2015, ex lap, ALBINA in 2018, DVT, PE, on Xarelto, IVC filter, chronic leg swelling, and anasarca, came in to the ED after being sent by PCP, Dr. Ross for generalized weakness and increased in abdominal girth. Patient admitted with anasarca with increased asci ties along with severe hypoalbuminemia. CT abdomen showed colitis and concern for SBO. Initially treated with antibiotics, dc'd per ID. S/P paracentesis in IR on 5/21. However, accidentally discard fluids so unable to do serology test.   Pt is stable medically for discharge. Waiting for placement. as per CM pt needs Negative COVID test report 24 hours prior to d/c. rpt swab ordered   INTERVAL HPI/OVERNIGHT EVENTS: no new complaints    MEDICATIONS  (STANDING):  furosemide    Tablet 20 milliGRAM(s) Oral daily  pantoprazole    Tablet 40 milliGRAM(s) Oral before breakfast  rivaroxaban 20 milliGRAM(s) Oral with dinner    MEDICATIONS  (PRN):      __________________________________________________  REVIEW OF SYSTEMS:    CONSTITUTIONAL: No fever,   EYES: no acute visual disturbances  NECK: No pain or stiffness  RESPIRATORY: No cough; No shortness of breath  CARDIOVASCULAR: No chest pain, no palpitations  GASTROINTESTINAL: No pain. No nausea or vomiting; No diarrhea   NEUROLOGICAL: No headache or numbness, no tremors  MUSCULOSKELETAL: No joint pain, no muscle pain  GENITOURINARY: no dysuria, no frequency, no hesitancy  PSYCHIATRY: no depression , no anxiety  ALL OTHER  ROS negative        Vital Signs Last 24 Hrs  T(C): 36.6 (27 May 2020 13:12), Max: 37.4 (26 May 2020 19:53)  T(F): 97.8 (27 May 2020 13:12), Max: 99.3 (26 May 2020 19:53)  HR: 73 (27 May 2020 13:12) (71 - 81)  BP: 90/60 (27 May 2020 13:12) (83/57 - 90/60)  BP(mean): --  RR: 18 (27 May 2020 13:12) (16 - 18)  SpO2: 99% (27 May 2020 13:12) (99% - 99%)    ________________________________________________  PHYSICAL EXAM:  GENERAL: NAD  HEENT: Normocephalic;  conjunctivae and sclerae clear; moist mucous membranes;   NECK : supple  CHEST/LUNG: Clear to auscultation bilaterally with good air entry   HEART: S1 S2  regular; no murmurs, gallops or rubs  ABDOMEN: Soft, Nontender, Nondistended; Bowel sounds present  EXTREMITIES: no cyanosis; no calf tenderness, generalized edema due to anasarca   SKIN: warm and dry; no rash  NERVOUS SYSTEM:  Awake and alert; Oriented  to place, person and time ; no new deficits    _________________________________________________  LABS:                        10.3   4.87  )-----------( 87       ( 27 May 2020 06:53 )             33.0     05-27    143  |  112<H>  |  7   ----------------------------<  67<L>  4.8   |  24  |  0.56    Ca    7.9<L>      27 May 2020 06:53  Phos  2.8     05-27  Mg     1.8     05-27          CAPILLARY BLOOD GLUCOSE            RADIOLOGY & ADDITIONAL TESTS:  < from: CT Abdomen and Pelvis w/ IV Cont (05.19.20 @ 00:16) >    EXAM:  CT ABDOMEN AND PELVIS IC                            PROCEDURE DATE:  05/19/2020          INTERPRETATION:  CT ABDOMEN AND PELVIS WITH CONTRAST    INDICATION: Abdominal swelling. Increased abdominal girth.    TECHNIQUE: Contrast enhanced CT ofthe abdomen and pelvis. Images are reformatted in the sagittal and coronal planes.    90 mL of Omnipaque 350 contrast material was injected IV.    COMPARISON: CT abdomen pelvis 3/10/2020.    FINDINGS:    Lower Thorax: Small bilateral pleural effusionwith adjacent bibasilar opacities, likely represent dependent atelectasis. Recommend clinical correlation to assess for superimposed infection.    Liver: Steatosis.  Biliary: No significant dilatation. Cholelithiasis.  Spleen: No suspicious lesions.  Pancreas: No inflammatory changes or ductal dilatation.  Adrenals: Normal.  Kidneys: No hydronephrosis. 5 mm right renal angiomyolipoma.  Vessels: Mild atherosclerotic disease of the aorta and its branches. Celiac, SMA and KINA axes appears patent. Stable appearance of IVC filter.    GI tract: There is multisegmental wall thickening of small and large bowel loops with hyperemia, most pronounced at the level of jejunal ileal folds, concerning for enterocolitis, likely infectious or inflammatory nature. Small bowel anastomosis revisualized in the anterior mid abdomen with focal dilatation of bowel loops to 4.3 cm. Additional focally dilated fecalized jejunal loop measuring up to 4 cm in the left side of abdomen with suggestion of transition point in the left upper abdomen near surgical anastomosis images 62 through 66 of series 2 raising possibility of ileus or at least partial small bowel obstruction.    Peritoneum/retroperitoneum and mesentery: Large ascites, increased compared to the prior study. No free air. No organized fluid collection. No bulky adenopathy.    Pelvic organs/Bladder: No pelvic masses. Bladder is normal.    Abdominal wall: Diffuse anasarca.  Bones and soft tissues: Multilevel degenerative changes of the spine are noted with patchy osteopenia, limiting detailed evaluation.     IMPRESSION:    Multisegmental wall thickening of small and large bowel loops with hyperemia, most pronounced at the level of jejunal ileal folds, concerning for enterocolitis, likely infectious or inflammatory nature.     Small bowel anastomosis revisualized in the anterior mid abdomen with focal dilatation of bowel loops to 4.3 cm. Additional focally dilated fecalized jejunal loop measuring up to 4 cm in the left side of abdomen with suggestion of transition point in the left upper abdomen near surgical anastomosis, raising possibility of ileus or at least partial small bowel obstruction. No pneumatosis or findings of portal venous gas to suggest bowel ischemia. Correlate with lactic acid.    Large ascites, increased compared to the prior study.    Cholelithiasis.    Small bilateral pleural effusion with adjacent bibasilar opacities, likely represent dependent atelectasis. Recommend clinical correlation to assess for superimposed infection.                  EVGENY PANDA M.D., ATTENDING RADIOLOGIST  This document has been electronically signed. May 19 2020  1:29AM        < end of copied text >    Imaging Personally Reviewed:  YES    Consultant(s) Notes Reviewed:   YES    Care Discussed with Consultants : hema/ GI     Plan of care was discussed with patient and /or primary care giver; all questions and concerns were addressed and care was aligned with patient's wishes.

## 2020-05-27 NOTE — PROGRESS NOTE ADULT - PROBLEM SELECTOR PLAN 1
Due to hypoalbuminemia, unclear etiology   S/P paracentesis on 5/21   1.7L removed, fluid accidentally  discarded, no labs sent --- GI Dr. Felix cannot give any GI recommendations without serology results. Discussed with Dr. Ross, no tap during this admission. Pt is stable for discharge without 2nd paracentesis  improving  switched to P.O lasix 20mg P.O daily.

## 2020-05-28 NOTE — PROGRESS NOTE ADULT - PROBLEM SELECTOR PROBLEM 4
Hypoalbuminemia
Hypoalbuminemia
Pulmonary embolism
SBO (small bowel obstruction)
Pulmonary embolism
Colitis

## 2020-05-28 NOTE — PROGRESS NOTE ADULT - PROBLEM SELECTOR PROBLEM 2
Colitis
Thrombocytopenia
Colitis
Colitis
SBO (small bowel obstruction)
Hypoalbuminemia

## 2020-05-28 NOTE — PROGRESS NOTE ADULT - PROBLEM SELECTOR PLAN 8
Low phosphorous 1.2  replaced it this morning   f/u labs
Low phosphorous 1.2  replaced it this morning   f/u labs
c/w xarelto for PE
cbc   prbc prn   if drops hold xarelto
cbc   prbc prn   resolved   will karime sullivan
cbc   prbc prn   resolved   will karime sullivan
full code
full code
Low phosphorous   replaced it this morning   f/u labs
Low phosphorous   replaced it this morning   f/u labs

## 2020-05-28 NOTE — PROGRESS NOTE ADULT - COVID-19 NEGATIVE LAB RESULT
COVID-19 ruled out

## 2020-05-28 NOTE — PROGRESS NOTE ADULT - PROBLEM SELECTOR PROBLEM 7
Hypopotassemia
Hypopotassemia
Prophylactic measure
Pulmonary embolism
Hypopotassemia
Hypopotassemia
Anasarca
Prophylactic measure

## 2020-05-28 NOTE — PROGRESS NOTE ADULT - PROBLEM SELECTOR PROBLEM 6
Ascites
Ascites
Hypocalcemia
Hypophosphatemia
Pulmonary embolism
Pulmonary embolism
Ascites
Ascites
Prophylactic measure
Pulmonary embolism
Hypophosphatemia

## 2020-05-28 NOTE — PROGRESS NOTE ADULT - PROBLEM SELECTOR PLAN 2
CT abdomen showed colitis and concern for SBO.  Will start on IV cipro and IV flagyl.   GI consult Dr Felix.
CT abdomen showed colitis and concern for SBO.  Will start on IV cipro and IV flagyl.   GI consult Dr Felix.
Per ID likely intestinal swelling from anasarca from low protein and albumin levels, no evidence of infection  antibiotics dcd
dropped on 5.20 and now improving slowly   Heme/ onco Dr. Rivera consulted - requested abd. doppler to see if pt has protal or splenic vein thrombosis   -- CT A/P on 5.19 reviewed today with radiologist - those veins are patent and also, abd. doppler is not available   so, Dr. Rivera and Dr. Ross ok for d/c with f/u as out-pt   monitor CBC
dropped on 5.20 and now improving slowly   Heme/ onco Dr. Rivera consulted - requested abd. doppler to see if pt has protal or splenic vein thrombosis   -- CT A/P on 5.19 reviewed today with radiologist - those veins are patent and also, abd. doppler is not available   so, Dr. Rivera and Dr. Ross ok for d/c with f/u as out-pt   monitor CBC.
dropped on 5.20 and now improving slowly   Heme/ onco Dr. Rivera consulted - requested abd. doppler to see if pt has protal or splenic vein thrombosis   -- CT A/P on 5.19 reviewed today with radiologist - those veins are patent and also, abd. doppler is not available   so, Dr. Rivera and Dr. Ross ok for d/c with f/u as out-pt   monitor CBC.
CT abdomen showed colitis and concern for SBO.   Will start on IV cipro and IV flagyl.   NPO for now. Surgery evaluation  GI consult Dr Felix.
CT abdomen showed colitis and concern for SBO.  Will d/c IV cipro and IV flagyl as colitis is more like edema of bowel wall  GI consult Dr Felix noted recommendations
CT abdomen showed colitis and concern for SBO.  Will d/c IV cipro and IV flagyl as colitis is more like edema of bowel wall  GI consult Dr Felix noted recommendations
hypoalbuminemia, of unclear etiology, but is chronic in nature- will need diagnostic paracentesis- need to get cell count and differential, fluid albumin, fluid total protein, cytology  IR tap today

## 2020-05-28 NOTE — DISCHARGE NOTE NURSING/CASE MANAGEMENT/SOCIAL WORK - PATIENT PORTAL LINK FT
You can access the FollowMyHealth Patient Portal offered by [facility] by registering at the following website: [email or web address]. By joining [facility]’s FollowMyHealth portal, you will also be able to view your health information using other applications (apps) compatible with our system.

## 2020-05-28 NOTE — PROGRESS NOTE ADULT - PROBLEM SELECTOR PROBLEM 8
Anemia
Goals of care, counseling/discussion
Goals of care, counseling/discussion
Hypophosphatemia
Hypophosphatemia
Prophylactic measure
Hypophosphatemia
Hypophosphatemia

## 2020-05-28 NOTE — PROGRESS NOTE ADULT - PROBLEM SELECTOR PLAN 4
Clinically resolving   Surgery following.
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  given prothrombic state, will continue xarelto.
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  given prothrombic state, will continue xarelto.
Xarelto as per dr sullivan
Xarelto as per dr sullivan
Xarelto restarted
Xarelto restarted
see plan for anasarca
see plan for anasarca
History of DVT/PE  IVC filter  on xarelto which is on hold for IR GUIDED paracentasis  -inr 3.5 VIT K GIVEN
History of DVT/PE  IVC filter  on xarelto which is on hold for IR GUIDED paracentasis  -inr 3.5 VIT K GIVEN
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  given prothrombic state, will continue xarelto
no diarrhea or bleeding; no leukocytosis   unclear if pt has actual colitis vs. bowel wall edema due to severe hypoalbuminemia  abx held for now

## 2020-05-28 NOTE — PROGRESS NOTE ADULT - PROBLEM SELECTOR PROBLEM 9
Discharge planning issues
Discharge planning issues
Goals of care, counseling/discussion
Prophylactic measure

## 2020-05-28 NOTE — PROGRESS NOTE ADULT - SUBJECTIVE AND OBJECTIVE BOX
Patient was seen and examined  Patient is a 64y old  Female who presents with a chief complaint of Swelling, weakness (27 May 2020 15:07)      INTERVAL HPI/OVERNIGHT EVENTS:  T(C): 36.9 (05-28-20 @ 05:07), Max: 37 (05-27-20 @ 22:04)  HR: 84 (05-28-20 @ 10:49) (80 - 87)  BP: 89/54 (05-28-20 @ 10:49) (84/58 - 102/70)  RR: 17 (05-28-20 @ 05:07) (17 - 17)  SpO2: 97% (05-28-20 @ 05:07) (97% - 98%)  Wt(kg): --  I&O's Summary      LABS:                        9.9    4.27  )-----------( 92       ( 28 May 2020 07:00 )             32.0     05-28    144  |  111<H>  |  11  ----------------------------<  67<L>  5.1   |  27  |  0.54    Ca    8.1<L>      28 May 2020 07:00  Phos  3.3     05-28  Mg     1.9     05-28    TPro  4.3<L>  /  Alb  1.2<L>  /  TBili  0.9  /  DBili  x   /  AST  67<H>  /  ALT  81<H>  /  AlkPhos  359<H>  05-28        CAPILLARY BLOOD GLUCOSE                  MEDICATIONS  (STANDING):  furosemide    Tablet 20 milliGRAM(s) Oral daily  pantoprazole    Tablet 40 milliGRAM(s) Oral before breakfast  rivaroxaban 20 milliGRAM(s) Oral with dinner    MEDICATIONS  (PRN):      RADIOLOGY & ADDITIONAL TESTS:    Imaging Personally Reviewed:  [ ] YES  [ ] NO    REVIEW OF SYSTEMS:  CONSTITUTIONAL: No fever, weight loss, or fatigue  EYES: No eye pain, visual disturbances, or discharge  ENMT:  No difficulty hearing, tinnitus, vertigo; No sinus or throat pain  NECK: No pain or stiffness  BREASTS: No pain, masses, or nipple discharge  RESPIRATORY: No cough, wheezing, chills or hemoptysis; No shortness of breath  CARDIOVASCULAR: No chest pain, palpitations, dizziness, or leg swelling  GASTROINTESTINAL: No abdominal or epigastric pain. No nausea, vomiting, or hematemesis; No diarrhea or constipation. No melena or hematochezia.  GENITOURINARY: No dysuria, frequency, hematuria, or incontinence  NEUROLOGICAL: No headaches, memory loss, loss of strength, numbness, or tremors  SKIN: No itching, burning, rashes, or lesions   LYMPH NODES: No enlarged glands  ENDOCRINE: No heat or cold intolerance; No hair loss  MUSCULOSKELETAL: No joint pain or swelling; No muscle, back, or extremity pain  PSYCHIATRIC: No depression, anxiety, mood swings, or difficulty sleeping  HEME/LYMPH: No easy bruising, or bleeding gums  ALLERY AND IMMUNOLOGIC: No hives or eczema      Consultant(s) Notes Reviewed:  [ x ] YES  [ ] NO    PHYSICAL EXAM:  GENERAL: NAD, well-groomed, well-developed  HEAD:  Atraumatic, Normocephalic  EYES: EOMI, PERRLA, conjunctiva and sclera clear  ENMT: No tonsillar erythema, exudates, or enlargement; Moist mucous membranes, Good dentition, No lesions  NECK: Supple, No JVD, Normal thyroid  NERVOUS SYSTEM:  Alert & Oriented X3, Good concentration; Motor Strength 5/5 B/L upper and lower extremities; DTRs 2+ intact and symmetric  CHEST/LUNG: Clear to percussion bilaterally; No rales, rhonchi, wheezing, or rubs  HEART: Regular rate and rhythm; No murmurs, rubs, or gallops  ABDOMEN: Soft, Nontender, Nondistended; Bowel sounds present  EXTREMITIES:  2+ Peripheral Pulses, No clubbing, cyanosis, or edema  LYMPH: No lymphadenopathy noted  SKIN: No rashes or lesions    Care Discussed with Consultants/Other Providers [ x] YES  [ ] NO

## 2020-05-28 NOTE — PROGRESS NOTE ADULT - PROBLEM SELECTOR PLAN 3
CT abdomen showed colitis and concern for SBO.   Will start on IV cipro and IV flagyl.   NPO for now.   Called Surgery  to evaluate pt .   GI consult Dr Felix.
CT abdomen showed colitis and concern for SBO.   Will start on IV cipro and IV flagyl.   NPO for now.   Called Surgery  to evaluate pt .   GI consult Dr Felix.  TERE REYES
Clinically resolving   Surgery following
resolved  monitor level and replace it accordingly
resolved  monitor level and replace it accordingly
see plan as above
CT abdomen showed colitis and concern for SBO.   -s/c  IV cipro and IV flagyl as per gi recomendations  -diet advanced  Called Surgery  to evaluate pt .   GI consult Dr Felix.
CT abdomen showed colitis and concern for SBO.   -s/c  IV cipro and IV flagyl as per gi recomendations  -diet advanced  Called Surgery  to evaluate pt .   GI consult Dr Felix.
CT abdomen showed colitis and concern for SBO.  Will start on IV cipro and IV flagyl.   GI consult Dr Felix.
Monitor and replace prn

## 2020-05-28 NOTE — PROGRESS NOTE ADULT - PROBLEM SELECTOR PLAN 9
JOHNATHAN ( Bernarda Cates accepted ) -- need COVID 119 PCR negative within 24hours from d/c date -- f/u result
JOHNATHAN ( Bernarda Cates accepted ) -- need COVID 119 PCR negative within 24hours from d/c date -- f/u result
full code
on xarelto for DVT/PE  c/w PPI for GI ppx

## 2020-05-28 NOTE — PROGRESS NOTE ADULT - PROBLEM SELECTOR PROBLEM 3
Hypoalbuminemia
Hypophosphatemia
Hypophosphatemia
SBO (small bowel obstruction)
Colitis
SBO (small bowel obstruction)
SBO (small bowel obstruction)
Hypophosphatemia

## 2020-05-28 NOTE — PROGRESS NOTE ADULT - REASON FOR ADMISSION
Swelling, weakness

## 2020-07-10 NOTE — PATIENT PROFILE ADULT - HOW PATIENT ADDRESSED, PROFILE
Ya Ideal body weight was utilized to determine the target body weight for ordered crystalloid fluids.

## 2020-08-13 NOTE — PROGRESS NOTE ADULT - PROBLEM SELECTOR PLAN 6
IMPROVE VTE score:  [ ] Previous VTE                                                3  [ ] Thrombophilia                                             2  [ ] Lower limb paralysis                                  2        (unable to hold up >15 seconds)    [ ] Current Cancer (within 6 months)            2   x[] Immobilization > 24 hrs                              1  [ ] ICU/CCU stay > 24 hours                            1  [x] Age > 60                                                         1  IMPROVE 2   on xarelto Normal vision: sees adequately in most situations; can see medication labels, newsprint

## 2020-10-13 NOTE — PATIENT PROFILE ADULT - NSASFALLATTEMPTOOB_GEN_A_NUR
Abdominal Pain: Care Instructions  Your Care Instructions    Abdominal pain has many possible causes. Some aren't serious and get better on their own in a few days. Others need more testing and treatment. If your pain continues or gets worse, you need to be rechecked and may need more tests to find out what is wrong. You may need surgery to correct the problem. Don't ignore new symptoms, such as fever, nausea and vomiting, urination problems, pain that gets worse, and dizziness. These may be signs of a more serious problem. Your doctor may have recommended a follow-up visit in the next 8 to 12 hours. If you are not getting better, you may need more tests or treatment. The doctor has checked you carefully, but problems can develop later. If you notice any problems or new symptoms, get medical treatment right away. Follow-up care is a key part of your treatment and safety. Be sure to make and go to all appointments, and call your doctor if you are having problems. It's also a good idea to know your test results and keep a list of the medicines you take. How can you care for yourself at home? · Rest until you feel better. · To prevent dehydration, drink plenty of fluids, enough so that your urine is light yellow or clear like water. Choose water and other caffeine-free clear liquids until you feel better. If you have kidney, heart, or liver disease and have to limit fluids, talk with your doctor before you increase the amount of fluids you drink. · If your stomach is upset, eat mild foods, such as rice, dry toast or crackers, bananas, and applesauce. Try eating several small meals instead of two or three large ones. · Wait until 48 hours after all symptoms have gone away before you have spicy foods, alcohol, and drinks that contain caffeine. · Do not eat foods that are high in fat. · Avoid anti-inflammatory medicines such as aspirin, ibuprofen (Advil, Motrin), and naproxen (Aleve).  These can cause stomach upset. Talk to your doctor if you take daily aspirin for another health problem. When should you call for help? Call 911 anytime you think you may need emergency care. For example, call if:  ? · You passed out (lost consciousness). ? · You pass maroon or very bloody stools. ? · You vomit blood or what looks like coffee grounds. ? · You have new, severe belly pain. ?Call your doctor now or seek immediate medical care if:  ? · Your pain gets worse, especially if it becomes focused in one area of your belly. ? · You have a new or higher fever. ? · Your stools are black and look like tar, or they have streaks of blood. ? · You have unexpected vaginal bleeding. ? · You have symptoms of a urinary tract infection. These may include:  ¨ Pain when you urinate. ¨ Urinating more often than usual.  ¨ Blood in your urine. ? · You are dizzy or lightheaded, or you feel like you may faint. ? Watch closely for changes in your health, and be sure to contact your doctor if:  ? · You are not getting better after 1 day (24 hours). Where can you learn more? Go to http://jose-mustapha.info/. Enter C848 in the search box to learn more about \"Abdominal Pain: Care Instructions. \"  Current as of: March 20, 2017  Content Version: 11.4  © 8887-7201 [employer]. Care instructions adapted under license by [employer] (which disclaims liability or warranty for this information). If you have questions about a medical condition or this instruction, always ask your healthcare professional. [place] [address] any warranty or liability for your use of this information. Nausea and Vomiting: Care Instructions  Your Care Instructions    When you are nauseated, you may feel weak and sweaty and notice a lot of saliva in your mouth. Nausea often leads to vomiting.  Most of the time you do not need to worry about nausea and vomiting, but they can be signs of other illnesses. Two common causes of nausea and vomiting are stomach flu and food poisoning. Nausea and vomiting from viral stomach flu will usually start to improve within 24 hours. Nausea and vomiting from food poisoning may last from 12 to 48 hours. The doctor has checked you carefully, but problems can develop later. If you notice any problems or new symptoms, get medical treatment right away. Follow-up care is a key part of your treatment and safety. Be sure to make and go to all appointments, and call your doctor if you are having problems. It's also a good idea to know your test results and keep a list of the medicines you take. How can you care for yourself at home? · To prevent dehydration, drink plenty of fluids, enough so that your urine is light yellow or clear like water. Choose water and other caffeine-free clear liquids until you feel better. If you have kidney, heart, or liver disease and have to limit fluids, talk with your doctor before you increase the amount of fluids you drink. · Rest in bed until you feel better. · When you are able to eat, try clear soups, mild foods, and liquids until all symptoms are gone for 12 to 48 hours. Other good choices include dry toast, crackers, cooked cereal, and gelatin dessert, such as Jell-O. When should you call for help? Call 911 anytime you think you may need emergency care. For example, call if:  ? · You passed out (lost consciousness). ?Call your doctor now or seek immediate medical care if:  ? · You have symptoms of dehydration, such as:  ¨ Dry eyes and a dry mouth. ¨ Passing only a little dark urine. ¨ Feeling thirstier than usual.   ? · You have new or worsening belly pain. ? · You have a new or higher fever. ? · You vomit blood or what looks like coffee grounds. ? Watch closely for changes in your health, and be sure to contact your doctor if:  ? · You have ongoing nausea and vomiting. ? · Your vomiting is getting worse.    ? · Your vomiting lasts longer than 2 days. ? · You are not getting better as expected. Where can you learn more? Go to http://jose-mustapha.info/. Enter 25 [id number] in the search box to learn more about \"Nausea and Vomiting: Care Instructions. \"  Current as of: March 20, 2017  Content Version: 11.4  © 2624-8288 [employer]. Care instructions adapted under license by [employer] (which disclaims liability or warranty for this information). If you have questions about a medical condition or this instruction, always ask your healthcare professional. [place] [address] any warranty or liability for your use of this information. no

## 2020-10-13 NOTE — ED PROVIDER NOTE - CLINICAL SUMMARY MEDICAL DECISION MAKING FREE TEXT BOX
65y Female who is a poor historian with PMH of recurrent SBO's, s/p exp lap , SB resection in 2015, ex lap, ALBINA in 2018, DVT, PE, on Xarelto, IVC filter, anasarca, sent to ED by PCP (Dr. Ross) for hypotension during visit as well as generalized weakness and chills over the past week. Patient mildly hypotensive in ED, non-toxic appearing with non-specific constitutional symptoms which may be suggestive of a viral etiology.  DDX: Electrolyte abnormality, Viral etiology, Other  P: Labs (CBC, CMP, RVP, Covid, UA), Fluids (1L NS bolus) 65y Female who is a poor historian with PMH of recurrent SBO's, s/p exp lap , SB resection in 2015, ex lap, ALBINA in 2018, DVT, PE, on Xarelto, IVC filter, anasarca, sent to ED by PCP (Dr. Ross) for hypotension during visit as well as generalized weakness and chills over the past week. Patient mildly hypotensive in ED, non-toxic appearing with non-specific constitutional symptoms which may be suggestive of a viral etiology.  DDX: Electrolyte abnormality, Viral etiology, Other    see attending attestation.   P: Labs (CBC, CMP, RVP, Covid, UA), Fluids (1L NS bolus)

## 2020-10-13 NOTE — H&P ADULT - ASSESSMENT
65y Female from home, lives with daughter, ambulates with walker with PMH of recurrent SBO's, s/p exp lap, SB resection in 2015, ex lap, ALBINA in 2018, DVT, PE, on Xarelto, IVC filter, chronic leg swelling, and anasarca, came in to the ED after being sent by PCP Dr. Ross for generalized weakness and hypotension during office visit 65y Female from home, lives with daughter, ambulates with walker with PMH of recurrent SBO's, s/p exp lap, SB resection in 2015, ex lap, ALBINA in 2018, DVT, PE, on Xarelto, IVC filter, chronic leg swelling, and anasarca, came in to the ED after being sent by PCP Dr. Ross for generalized weakness and hypotension during office visit.    In ED, /71, HR 86  EKG NSR      Pt will be admitted for Hypotension, will r/o Sepsis

## 2020-10-13 NOTE — H&P ADULT - PROBLEM SELECTOR PLAN 1
Sent by PCP for low BP of 80/40  BP on arrival to /71, HR 86  Afebrile, no leukocytosis  CXR clear  Lactate 2.5  Albumin noted to be 1  Pt looks dry on examination, started on iv fluids  Started on iv albumin for 24 hours  Recent Echo in may 2020 is normal  f/u Blood Cx, Urine Cx  f/u CT A/P   Monitor BP Sent by PCP for low BP of 80/40  BP on arrival to /71, HR 86  Afebrile, no leukocytosis  CXR clear  Lactate 2.5  Albumin noted to be 1  Pt looks dry on examination, started on iv fluids  Started on iv albumin for 24 hours  Recent Echo in may 2020 is normal  CT A/P Diffuse dilatation of small bowel loops without a clear transition is slightly increased, likely an ileus  f/u Blood Cx, Urine Cx  Monitor BP  ID Dr. Patricio  GI Dr. Felix

## 2020-10-13 NOTE — DIETITIAN INITIAL EVALUATION ADULT. - 30 CAL TO
Consent: The patient's consent was obtained including but not limited to risks of crusting, scabbing, blistering, scarring, darker or lighter pigmentary change, recurrence, incomplete removal and infection.
Post-Care Instructions: I reviewed with the patient in detail post-care instructions. Patient is to wear sunprotection, and avoid picking at any of the treated lesions. Pt may apply Vaseline to crusted or scabbing areas.
Detail Level: Simple
Render Post-Care Instructions In Note?: no
Duration Of Freeze Thaw-Cycle (Seconds): 0
Number Of Freeze-Thaw Cycles: 2 freeze-thaw cycles
1765

## 2020-10-13 NOTE — ED ADULT TRIAGE NOTE - DIRECT TO ROOM CARE INITIATED:
[facility]  Administration of Anesthesia  I, Maximo GRETCHEN Peoples . agree to be cared for by a physician anesthesiologist alone and/or with a nurse anesthetist, who is specially trained to monitor me and give me medicine to put me to sleep or keep me comfortable during my procedure    I understand that my anesthesiologist and/or anesthetist is not an employee or agent of [facility] or [employer]. He or she works for [facility]    As the patient asking for anesthesia services, I agree to:  Allow the anesthesiologist (anesthesia doctor) to give me medicine and do additional procedures as necessary. Some examples are: Starting or using an “IV” to give me medicine, fluids or blood during my procedure, and having a breathing tube placed to help me breathe when I’m asleep (intubation). In the event that my heart stops working properly, I understand that my anesthesiologist will make every effort to sustain my life, unless otherwise directed by [facility] Do Not Resuscitate documents.  Tell my anesthesia doctor before my procedure:  If I am pregnant.  The last time that I ate or drank.  iii. All of the medicines I take (including prescriptions, herbal supplements, and pills I can buy without a prescription (including street drugs/illegal medications). Failure to inform my anesthesiologist about these medicines may increase my risk of anesthetic complications.  iv.If I am allergic to anything or have had a reaction to anesthesia before.  I understand how the anesthesia medicine will help me (benefits).  I understand that with any type of anesthesia medicine there are risks:  The most common risks are: nausea, vomiting, sore throat, muscle soreness, damage to my eyes, mouth, or teeth (from breathing tube placement).  Rare risks include: remembering what happened during my procedure, allergic reactions to medications, injury to my airway, heart, lungs, vision, nerves, or  muscles and in extremely rare instances death.  My doctor has explained to me other choices available to me for my care (alternatives).  Pregnant Patients (“epidural”):  I understand that the risks of having an epidural (medicine given into my back to help control pain during labor), include itching, low blood pressure, difficulty urinating, headache or slowing of the baby’s heart. Very rare risks include infection, bleeding, seizure, irregular heart rhythms and nerve injury.  Regional Anesthesia (“spinal”, “epidural”, & “nerve blocks”):  I understand that rare but potential complications include headache, bleeding, infection, seizure, irregular heart rhythms, and nerve injury.    _____________________________________________________________________________  Patient (or Representative) Signature/Relationship to Patient  Date   Time    _____________________________________________________________________________   Name (if used)    Language/Organization   Time    _____________________________________________________________________________  Nurse Anesthetist Signature     Date   Time  _____________________________________________________________________________  Anesthesiologist Signature     Date   Time  I have discussed the procedure and information above with the patient (or patient’s representative) and answered their questions. The patient or their representative has agreed to have anesthesia services.    _____________________________________________________________________________  Witness        Date   Time  I have verified that the signature is that of the patient or patient’s representative, and that it was signed before the procedure  Patient Name: Maximo GRETCHEN Peoples Jr.     : 1954                 Printed: 2024 at 3:01 PM    Medical Record #: [id number]                                            Page 1 of 1  ----------ANESTHESIA CONSENT----------     13-Oct-2020 13:52

## 2020-10-13 NOTE — ED ADULT NURSE NOTE - OBJECTIVE STATEMENT
pt is here for medical evaluation.  BIBA, sending by PMD d/t lower BP, denied chest pain or sob, denied fever or chills, denied N/V/D, no distress noted at this time.

## 2020-10-13 NOTE — ED PROVIDER NOTE - ATTENDING CONTRIBUTION TO CARE
65F sent in for hypotension. has had generalized weakness and some decreased PO intake and chills over the past several days. saw PCP who found the patient to be hypotensive in the office and sent to ED. BP within normal limits in the ED. normal work of breathing, lungs clear, no abdominal tenderness on exam. possible viral illness. will get labs to check for anemia, electrolytes, signs of infection and will reassess.

## 2020-10-13 NOTE — CONSULT NOTE ADULT - ASSESSMENT
Patient is a 65y old  Female from home, lives with daughter, ambulates with walker with PMH of recurrent SBO's, s/p exp lap, SB resection in 2015, ex lap, ALBINA in 2018, DVT, PE, on Xarelto, IVC filter, chronic leg swelling, and anasarca, Now send in to the ER by her PCP, Dr. Ross, for evaluation of  generalized weakness and hypotension BP of 80/40 during office visit. On admission, she found to have no fever and BP was in lower normal range and no Leukocytosis. The CXR is clear and CT abd/pelvis consistent with Ileus. The ID consult requested to assist with evaluation of infectious etiology of episode of hypotension.     # Hypotension  at office- BP of 80/40  # Ileus  - h/o recurrent SBO and s/p EXp. LAp  # COVID 19 negative       would recommend:    1. Follow up Urine analysis and blood cultures  2. Obtain Procalcitonin level and if > 0.25 the start on Abx  3. Monitor Blood pressure and IVF as needed  4. Hold off Abx for now   5. Management of Ileus as per Primary    will follow the patient with you and make further recommendation based on the clinical course and Lab results  Thank you for the opportunity to participate in Ms. PADILLA's care    Attending Attestation:    Spent more than 65 minutes on total encounter, more than 50 % of the visit was spent counseling and/or coordinating care by the Attending physician.

## 2020-10-13 NOTE — ED ADULT TRIAGE NOTE - CHIEF COMPLAINT QUOTE
sent from urgent care for low blood pressure, patient states feeling general weakness and cold for 2 weeks.

## 2020-10-13 NOTE — H&P ADULT - PROBLEM SELECTOR PLAN 4
RISK                                                          Points  [  ] Previous VTE                                                3  [  ] Thrombophilia                                             2  [  ] Lower limb paralysis                                   2        (unable to hold up >15 seconds)    [  ] Current Cancer                                             2         (within 6 months)  [ x ] Immobilization > 24 hrs                              1  [  ] ICU/CCU stay > 24 hours                             1  [ x ] Age > 60                                                         1    IMPROVE VTE Score: 2  On Xarelto

## 2020-10-13 NOTE — ED PROVIDER NOTE - OBJECTIVE STATEMENT
65y Female who is a poor historian with PMH of recurrent SBO's, s/p exp lap , SB resection in 2015, ex lap, ALBINA in 2018, DVT, PE, on Xarelto, IVC filter, anasarca, sent to ED by PCP (Dr. Ross) for hypotension during visit as well as generalized weakness and chills over the past week. Patient was seen by PCP for weakness and chills earlier today and was sent to the ED after she was noted to be hypotensive. Patient denies fever, SOB, palpitations, changes to her bowel or urinary habits, but does not "mild gas" pain in her abdomen. Patient with no acute complaints and reports normal PO intake over the past week.

## 2020-10-13 NOTE — CONSULT NOTE ADULT - SUBJECTIVE AND OBJECTIVE BOX
Patient is a 65y old  Female from home, lives with daughter, ambulates with walker with PMH of recurrent SBO's, s/p exp lap, SB resection in 2015, ex lap, ALBINA in 2018, DVT, PE, on Xarelto, IVC filter, chronic leg swelling, and anasarca, Now send in to the ER by her PCP, Dr. Ross, for evaluation of  generalized weakness and hypotension BP of 80/40 during office visit. On admission, she found to have no fever and BP was in lower normal range and no Leukocytosis. The CXR is clear and CT abd/pelvis consistent with Ileus. The ID consult requested to assist with evaluation of infectious etiology of episode of hypotension.         REVIEW OF SYSTEMS: Total of twelve systems have been reviewed with patient and found to be negative unless mentioned in HPI        PAST MEDICAL & SURGICAL HISTORY:  Anasarca  Pulmonary embolism  DVT (deep venous thrombosis)  SBO (small bowel obstruction)  H/O exploratory laparotomy        SOCIAL HISTORY  Alcohol: Does not drink  Tobacco: Does not smoke  Illicit substance use: None      FAMILY HISTORY: Non contributory to the present illness        ALLERGIES: No Known Allergies        Vital Signs Last 24 Hrs  T(C): 36.8 (13 Oct 2020 19:00), Max: 36.8 (13 Oct 2020 19:00)  T(F): 98.3 (13 Oct 2020 19:00), Max: 98.3 (13 Oct 2020 19:00)  HR: 92 (13 Oct 2020 19:00) (86 - 98)  BP: 104/78 (13 Oct 2020 19:00) (98/72 - 108/71)  BP(mean): 72 (13 Oct 2020 19:00) (72 - 72)  RR: 18 (13 Oct 2020 19:00) (17 - 18)  SpO2: 99% (13 Oct 2020 19:00) (97% - 99%)        PHYSICAL EXAM:  GENERAL: Not in distress   CHEST/LUNG: Not using accessory muscles   HEART: s1 and s2 present  ABDOMEN:  Nontender and  Nondistended  EXTREMITIES: No pedal  edema  CNS: Awake and Alert        LABS:                        8.9    8.86  )-----------( 245      ( 13 Oct 2020 14:26 )             27.8         10-13    142  |  111<H>  |  22<H>  ----------------------------<  105<H>  5.0   |  26  |  0.90    Ca    7.4<L>      13 Oct 2020 14:26  Mg     1.8     10-13    TPro  4.2<L>  /  Alb  1.0<L>  /  TBili  0.6  /  DBili  x   /  AST  46<H>  /  ALT  70<H>  /  AlkPhos  125<H>  10-13        MEDICATIONS  (STANDING):  albumin human 25% IVPB 100 milliLiter(s) IV Intermittent every 6 hours  pantoprazole    Tablet 40 milliGRAM(s) Oral before breakfast  rivaroxaban 15 milliGRAM(s) Oral daily  senna 2 Tablet(s) Oral at bedtime  sodium chloride 0.9%. 1000 milliLiter(s) (100 mL/Hr) IV Continuous <Continuous>    MEDICATIONS  (PRN):        RADIOLOGY & ADDITIONAL TESTS:    10/13/20 : CT Abdomen and Pelvis w/ IV Cont (10.13.20 @ 18:50) >    Diffuse dilatation of small bowel loops without a clear transition is slightly increased, likely an ileus.  Decreased moderate ascites.    1.5 cm pancreatic versus peripancreatic soft tissue nodule    10/13/20 : Xray Chest 1 View- PORTABLE-Urgent (Xray Chest 1 View- PORTABLE-Urgent .) (10.13.20 @ 16:34) Clear lungs.                     Patient is a 65y old  Female from home, lives with daughter, ambulates with walker with PMH of recurrent SBO's, s/p exp lap, SB resection in 2015, ex lap, ALBINA in 2018, DVT, PE, on Xarelto, IVC filter, chronic leg swelling, and anasarca, Now send in to the ER by her PCP, Dr. Ross, for evaluation of  generalized weakness and hypotension BP of 80/40 during office visit. On admission, she found to have no fever and BP was in lower normal range and no Leukocytosis. The CXR is clear and CT abd/pelvis consistent with Ileus. The ID consult requested to assist with evaluation of infectious etiology of episode of hypotension.         REVIEW OF SYSTEMS: Total of twelve systems have been reviewed with patient and found to be negative unless mentioned in HPI        PAST MEDICAL & SURGICAL HISTORY:  Anasarca  Pulmonary embolism  DVT (deep venous thrombosis)  SBO (small bowel obstruction)  H/O exploratory laparotomy        SOCIAL HISTORY  Alcohol: Does not drink  Tobacco: Does not smoke  Illicit substance use: None      FAMILY HISTORY: Non contributory to the present illness        ALLERGIES: No Known Allergies        Vital Signs Last 24 Hrs  T(C): 36.8 (13 Oct 2020 19:00), Max: 36.8 (13 Oct 2020 19:00)  T(F): 98.3 (13 Oct 2020 19:00), Max: 98.3 (13 Oct 2020 19:00)  HR: 92 (13 Oct 2020 19:00) (86 - 98)  BP: 104/78 (13 Oct 2020 19:00) (98/72 - 108/71)  BP(mean): 72 (13 Oct 2020 19:00) (72 - 72)  RR: 18 (13 Oct 2020 19:00) (17 - 18)  SpO2: 99% (13 Oct 2020 19:00) (97% - 99%)        PHYSICAL EXAM:  GENERAL: Not in distress, appears weak  CHEST/LUNG: Not using accessory muscles   HEART: s1 and s2 present  ABDOMEN:  Nontender and  Nondistended  EXTREMITIES: No pedal  edema  CNS: Awake and Alert        LABS:                        8.9    8.86  )-----------( 245      ( 13 Oct 2020 14:26 )             27.8         10-13    142  |  111<H>  |  22<H>  ----------------------------<  105<H>  5.0   |  26  |  0.90    Ca    7.4<L>      13 Oct 2020 14:26  Mg     1.8     10-13    TPro  4.2<L>  /  Alb  1.0<L>  /  TBili  0.6  /  DBili  x   /  AST  46<H>  /  ALT  70<H>  /  AlkPhos  125<H>  10-13        MEDICATIONS  (STANDING):  albumin human 25% IVPB 100 milliLiter(s) IV Intermittent every 6 hours  pantoprazole    Tablet 40 milliGRAM(s) Oral before breakfast  rivaroxaban 15 milliGRAM(s) Oral daily  senna 2 Tablet(s) Oral at bedtime  sodium chloride 0.9%. 1000 milliLiter(s) (100 mL/Hr) IV Continuous <Continuous>    MEDICATIONS  (PRN):        RADIOLOGY & ADDITIONAL TESTS:    10/13/20 : CT Abdomen and Pelvis w/ IV Cont (10.13.20 @ 18:50) >    Diffuse dilatation of small bowel loops without a clear transition is slightly increased, likely an ileus.  Decreased moderate ascites.    1.5 cm pancreatic versus peripancreatic soft tissue nodule    10/13/20 : Xray Chest 1 View- PORTABLE-Urgent (Xray Chest 1 View- PORTABLE-Urgent .) (10.13.20 @ 16:34) Clear lungs.

## 2020-10-13 NOTE — H&P ADULT - NSHPPHYSICALEXAM_GEN_ALL_CORE
General - NAD  Eyes - PERRLA, EOM intact  ENT - Oropharynx clear. Moist mucous membranes. Conjunctivae appear well perfused.   Neck - No noticeable or palpable swelling, redness or rash around throat or on face  Lymph Nodes - No lymphadenopathy  Cardiovascular - RRR no m/r/g, no JVD, no carotid bruits  Lungs - Clear to ascultation, no use of accessory muscles, no crackles or wheezes.  Skin - No rashes, skin warm and dry, no erythematous areas  Abdomen - Normal bowel sounds, abdomen soft and nontender, no hepatosplenomegaly.  Extremities - No edema, cyanosis or clubbing  MusculoSkeletal - 5/5 strength, normal range of motion, no swollen or erythematous  joints.  Neurological – Alert and oriented x 3, CN 2-12 grossly intact.

## 2020-10-13 NOTE — H&P ADULT - HISTORY OF PRESENT ILLNESS
65y Female from home, lives with daughter, ambulates with walker with PMH of recurrent SBO's, s/p exp lap, SB resection in 2015, ex lap, ALBINA in 2018, DVT, PE, on Xarelto, IVC filter, chronic leg swelling, and anasarca, came in to the ED after being sent by PCP Dr. Ross for generalized weakness and hypotension during office visit. Patient was seen by PCP for weakness and chills earlier today and was sent to the ED after she was noted to be hypotensive with BP 80/40. Patient denies fever, SOB, palpitations, changes to her bowel or urinary habits, abdominal pain, urinary symptoms or any other acute complaints.    In ED, /71, HR 86
Age appropriate behavior

## 2020-10-14 NOTE — PROGRESS NOTE ADULT - SUBJECTIVE AND OBJECTIVE BOX
Patient is seen and examined at the bed side, is afebrile. She mentioned feeling little better.  The cultures are in process.         REVIEW OF SYSTEMS: All other review systems are negative        ALLERGIES: No Known Allergies        Vital Signs Last 24 Hrs  T(C): 36.2 (14 Oct 2020 16:33), Max: 36.3 (13 Oct 2020 22:25)  T(F): 97.1 (14 Oct 2020 16:33), Max: 97.3 (13 Oct 2020 22:25)  HR: 86 (14 Oct 2020 16:33) (80 - 89)  BP: 89/64 (14 Oct 2020 16:33) (82/54 - 99/66)  BP(mean): --  RR: 18 (14 Oct 2020 16:33) (16 - 18)  SpO2: 100% (14 Oct 2020 16:33) (98% - 100%)        PHYSICAL EXAM:  GENERAL: Not in distress, appears weak  CHEST/LUNG: Not using accessory muscles   HEART: s1 and s2 present  ABDOMEN:  Nontender and  Nondistended  EXTREMITIES: No pedal  edema  CNS: Awake and Alert        LABS:                        7.8    5.41  )-----------( 211      ( 14 Oct 2020 10:16 )             26.1                           8.9    8.86  )-----------( 245      ( 13 Oct 2020 14:26 )             27.8         10-14    143  |  112<H>  |  17  ----------------------------<  59<L>  3.7   |  23  |  0.47<L>    Ca    7.1<L>      14 Oct 2020 06:49  Phos  2.3     10-14  Mg     2.3     10-14    TPro  3.8<L>  /  Alb  1.7<L>  /  TBili  1.4<H>  /  DBili  x   /  AST  28  /  ALT  45  /  AlkPhos  91  10-14    10-13    142  |  111<H>  |  22<H>  ----------------------------<  105<H>  5.0   |  26  |  0.90    Ca    7.4<L>      13 Oct 2020 14:26  Mg     1.8     10-13    TPro  4.2<L>  /  Alb  1.0<L>  /  TBili  0.6  /  DBili  x   /  AST  46<H>  /  ALT  70<H>  /  AlkPhos  125<H>  10-13          MEDICATIONS  (STANDING):    pantoprazole    Tablet 40 milliGRAM(s) Oral before breakfast  rivaroxaban 15 milliGRAM(s) Oral daily  senna 2 Tablet(s) Oral at bedtime      RADIOLOGY & ADDITIONAL TESTS:    Urine Microscopic-Add On (NC) (10.13.20 @ 21:39)   Bacteria: Moderate /HPF   Epithelial Cells: Moderate /HPF   Red Blood Cell - Urine: 5-10 /HPF   White Blood Cell - Urine: 6-10 /HPF     10/13/20 : CT Abdomen and Pelvis w/ IV Cont (10.13.20 @ 18:50) >    Diffuse dilatation of small bowel loops without a clear transition is slightly increased, likely an ileus.  Decreased moderate ascites.    1.5 cm pancreatic versus peripancreatic soft tissue nodule    10/13/20 : Xray Chest 1 View- PORTABLE-Urgent (Xray Chest 1 View- PORTABLE-Urgent .) (10.13.20 @ 16:34) Clear lungs.

## 2020-10-14 NOTE — CONSULT NOTE ADULT - ASSESSMENT
64 YO F with hx of multiple SBO, admitted for hypotension on CT scan found to have Diffuse dilatation of small bowel loops without a clear transition is slightly increased, likely an ileus, on physical exam abd distended and tympanic but as per patient her abd is better then usual as she is always bloated, had a BM yesterday and passing flatus today     #Ileus   - Pt continues to have BM and pas flatus, tolerating PO intake, for now would give Reglan 10mg po qd x 7 days   - If pts symptoms worsen and distention not improving would make her NPO and place NG tube  - rest of care as per primary care team

## 2020-10-14 NOTE — DIETITIAN INITIAL EVALUATION ADULT. - PROBLEM SELECTOR PLAN 1
Sent by PCP for low BP of 80/40  BP on arrival to /71, HR 86  Afebrile, no leukocytosis  CXR clear  Lactate 2.5  Albumin noted to be 1  Pt looks dry on examination, started on iv fluids  Started on iv albumin for 24 hours  Recent Echo in may 2020 is normal  CT A/P Diffuse dilatation of small bowel loops without a clear transition is slightly increased, likely an ileus  f/u Blood Cx, Urine Cx  Monitor BP  ID Dr. Patricio  GI Dr. Felix

## 2020-10-14 NOTE — PROGRESS NOTE ADULT - ASSESSMENT
Patient is a 65y old  Female from home, lives with daughter, ambulates with walker with PMH of recurrent SBO's, s/p exp lap, SB resection in 2015, ex lap, ALBINA in 2018, DVT, PE, on Xarelto, IVC filter, chronic leg swelling, and anasarca, Now send in to the ER by her PCP, Dr. Ross, for evaluation of  generalized weakness and hypotension BP of 80/40 during office visit. On admission, she found to have no fever and BP was in lower normal range and no Leukocytosis. The CXR is clear and CT abd/pelvis consistent with Ileus. The ID consult requested to assist with evaluation of infectious etiology of episode of hypotension.     # Hypotension  at office- BP of 80/40  # Ileus  - h/o recurrent SBO and s/p EXp. LAp  # COVID 19 negative       would recommend:    1. Obtain Procalcitonin level and if > 0.25 the start on Abx  2. Follow up Urine  blood cultures  3. Monitor Blood pressure and IVF as needed  4. Hold off Abx for now   5. Management of Ileus as per Primary      Attending Attestation:    Spent more than 45 minutes on total encounter, more than 50 % of the visit was spent counseling and/or coordinating care by the Attending physician.

## 2020-10-14 NOTE — PROGRESS NOTE ADULT - PROBLEM SELECTOR PLAN 1
Afebrile, no leukocytosis  CXR clear  Lactate 2.5  Albumin noted to be 1  Likely due to anasarca   Started on iv albumin for 24 hours  Recent Echo in may 2020 is normal  f/u Blood Cx, Urine Cx  Monitor BP  ID Dr. Patricio

## 2020-10-14 NOTE — DIETITIAN INITIAL EVALUATION ADULT. - OTHER INFO
Patient from home lives with daughter. Visited pt. alert but very weak, report poor po intake >2wks with weakness, "sometimes will have a good breakfast or drinking 1 glass of milk"? & picking at lunch/dinner meals, denies nausea/vomiting or diarrhea PTA. Per pt. losing weight but unable to quantify, dosing wt. 106 Lbs & able to conduct nutrition focus physical exam, see below fields. Presently pt. consuming ~40% of breakfast meals Patient from home lives with daughter. Visited pt. alert but very weak, report poor po intake >2wks with weakness, "sometimes will have a good breakfast or drinking 1 glass of milk"? & picking at lunch/dinner meals, has loose dentures, denies nausea/vomiting or diarrhea PTA. Per pt. losing weight but unable to quantify, dosing wt. 106 Lbs & was able to conduct nutrition focus physical exam, see below fields, also last admission pt. dx. with severe malnutrition on 5/21/20 noted, Presently pt. consuming ~40% of breakfast meals, willing to have nutrition supplement with soft texture meals, encourage po intake with meal set up. Skin intact.

## 2020-10-14 NOTE — PROGRESS NOTE ADULT - PROBLEM SELECTOR PLAN 2
likely multifactorial   anemia worsened by hemodilution from NS boluses   pending FBOT   iron, b12, and folate WNL   Monitor CBC daily

## 2020-10-14 NOTE — DIETITIAN INITIAL EVALUATION ADULT. - FACTORS AFF FOOD INTAKE
persistent lack of appetite/weakness, SOB, hypotension, anasarca, Pulmonary embolism, DVT, anemia, h/o exploratory laparotomy, SB resection in 2015/other (specify)

## 2020-10-14 NOTE — DIETITIAN INITIAL EVALUATION ADULT. - PERTINENT LABORATORY DATA
10-14 Na143 mmol/L Glu 59 mg/dL<L> K+ 3.7 mmol/L Cr  0.47 mg/dL<L> BUN 17 mg/dL 10-14 Phos 2.3 mg/dL<L> 10-14 Alb 1.7 g/dL<L> 10-14 Chol 67 mg/dL LDL 41 mg/dL HDL 5 mg/dL<L> Trig 106 mg/dL

## 2020-10-14 NOTE — PROGRESS NOTE ADULT - SUBJECTIVE AND OBJECTIVE BOX
NP Note discussed with  Primary Attending    Patient is a 65y old  Female who presents with a chief complaint of Hypotension from home, lives with daughter, ambulates with walker with PMH of recurrent SBO's, s/p exp lap, SB resection in 2015, DVT, PE, on Xarelto, IVC filter, chronic leg swelling, and anasarca, sent in by PCP Dr. Ross for generalized weakness and hypotension during office visit BP 80/40. Admitted to medicine for hypotension s/p 4L NS in ED   In ED, /71, HR 86    INTERVAL HPI/OVERNIGHT EVENTS: no new complaints    MEDICATIONS  (STANDING):  pantoprazole    Tablet 40 milliGRAM(s) Oral before breakfast  rivaroxaban 15 milliGRAM(s) Oral daily  senna 2 Tablet(s) Oral at bedtime    MEDICATIONS  (PRN):      __________________________________________________  REVIEW OF SYSTEMS:    CONSTITUTIONAL: No fever,   EYES: no acute visual disturbances  NECK: No pain or stiffness  RESPIRATORY: No cough; No shortness of breath  CARDIOVASCULAR: No chest pain, no palpitations  GASTROINTESTINAL: No pain. No nausea or vomiting; No diarrhea   NEUROLOGICAL: No headache or numbness, no tremors  MUSCULOSKELETAL: No joint pain, no muscle pain  GENITOURINARY: no dysuria, no frequency, no hesitancy  PSYCHIATRY: no depression , no anxiety  ALL OTHER  ROS negative        Vital Signs Last 24 Hrs  T(C): 36.1 (14 Oct 2020 07:55), Max: 36.8 (13 Oct 2020 19:00)  T(F): 97 (14 Oct 2020 07:55), Max: 98.3 (13 Oct 2020 19:00)  HR: 84 (14 Oct 2020 07:55) (80 - 98)  BP: 87/45 (14 Oct 2020 07:55) (82/54 - 108/71)  BP(mean): 72 (13 Oct 2020 19:00) (72 - 72)  RR: 18 (14 Oct 2020 07:55) (16 - 18)  SpO2: 100% (14 Oct 2020 07:55) (97% - 100%)    ________________________________________________  PHYSICAL EXAM:  GENERAL: NAD  HEENT: Normocephalic;  conjunctivae and sclerae clear; moist mucous membranes;   NECK : supple  CHEST/LUNG: Clear to auscultation bilaterally with good air entry   HEART: S1 S2  regular; no murmurs, gallops or rubs  ABDOMEN: Soft, Nontender, Nondistended; Bowel sounds present  EXTREMITIES: no cyanosis; no edema; no calf tenderness  SKIN: warm and dry; no rash  NERVOUS SYSTEM:  Awake and alert; Oriented  to place, person and time ; no new deficits    _________________________________________________  LABS:                        7.8    5.41  )-----------( 211      ( 14 Oct 2020 10:16 )             26.1     10-14    143  |  112<H>  |  17  ----------------------------<  59<L>  3.7   |  23  |  0.47<L>    Ca    7.1<L>      14 Oct 2020 06:49  Phos  2.3     10-14  Mg     2.3     10-14    TPro  3.8<L>  /  Alb  1.7<L>  /  TBili  1.4<H>  /  DBili  x   /  AST  28  /  ALT  45  /  AlkPhos  91  10-14      Urinalysis Basic - ( 13 Oct 2020 21:39 )    Color: Sue / Appearance: Clear / S.010 / pH: x  Gluc: x / Ketone: Negative  / Bili: Negative / Urobili: 1   Blood: x / Protein: Negative / Nitrite: Negative   Leuk Esterase: Trace / RBC: 5-10 /HPF / WBC 6-10 /HPF   Sq Epi: x / Non Sq Epi: Moderate /HPF / Bacteria: Moderate /HPF      CAPILLARY BLOOD GLUCOSE            RADIOLOGY & ADDITIONAL TESTS:    Imaging Personally Reviewed:  YES/NO    Consultant(s) Notes Reviewed:   YES/ No    Care Discussed with Consultants :     Plan of care was discussed with patient and /or primary care giver; all questions and concerns were addressed and care was aligned with patient's wishes.     NP Note discussed with  Primary Attending    Patient is a 65y old  Female who presents with a chief complaint of Hypotension from home, lives with daughter, ambulates with walker with PMH of recurrent SBO's, s/p exp lap, SB resection in 2015, DVT, PE, on Xarelto, IVC filter, chronic leg swelling, and anasarca, sent in by PCP Dr. Ross for generalized weakness and hypotension during office visit BP 80/40. Admitted to medicine for hypotension s/p 4L NS in ED, s/p albumin Q6 for 24 hours.  Pending urine and blood cultures.    INTERVAL HPI/OVERNIGHT EVENTS: Patient seen and examined at bedside offers no new complaints endorses "feeling better"     MEDICATIONS  (STANDING):  pantoprazole    Tablet 40 milliGRAM(s) Oral before breakfast  rivaroxaban 15 milliGRAM(s) Oral daily  senna 2 Tablet(s) Oral at bedtime    MEDICATIONS  (PRN):      __________________________________________________  REVIEW OF SYSTEMS:    CONSTITUTIONAL: No fever,   EYES: no acute visual disturbances  NECK: No pain or stiffness  RESPIRATORY: No cough; No shortness of breath  CARDIOVASCULAR: No chest pain, no palpitations  GASTROINTESTINAL: No pain. No nausea or vomiting; No diarrhea   NEUROLOGICAL: No headache or numbness, no tremors  MUSCULOSKELETAL: No joint pain, no muscle pain  GENITOURINARY: no dysuria, no frequency, no hesitancy  PSYCHIATRY: no depression , no anxiety  ALL OTHER  ROS negative        Vital Signs Last 24 Hrs  T(C): 36.1 (14 Oct 2020 07:55), Max: 36.8 (13 Oct 2020 19:00)  T(F): 97 (14 Oct 2020 07:55), Max: 98.3 (13 Oct 2020 19:00)  HR: 84 (14 Oct 2020 07:55) (80 - 98)  BP: 87/45 (14 Oct 2020 07:55) (82/54 - 108/71)  BP(mean): 72 (13 Oct 2020 19:00) (72 - 72)  RR: 18 (14 Oct 2020 07:55) (16 - 18)  SpO2: 100% (14 Oct 2020 07:55) (97% - 100%)    ________________________________________________  PHYSICAL EXAM:  GENERAL: NAD  HEENT: Normocephalic;  conjunctivae and sclerae clear  NECK : supple  CHEST/LUNG: Clear to auscultation bilaterally with good air entry   HEART: S1 S2  regular; no murmurs, gallops or rubs  ABDOMEN: Soft, Nontender, Nondistended; Bowel sounds present  EXTREMITIES: b/l LE swelling- no cyanosis; no calf tenderness  SKIN: warm and dry; no rash  NERVOUS SYSTEM:  Awake and alert; Oriented  to place, person and time   _________________________________________________  LABS:                        7.8    5.41  )-----------( 211      ( 14 Oct 2020 10:16 )             26.1     10-    143  |  112<H>  |  17  ----------------------------<  59<L>  3.7   |  23  |  0.47<L>    Ca    7.1<L>      14 Oct 2020 06:49  Phos  2.3     10-  Mg     2.3     10-    TPro  3.8<L>  /  Alb  1.7<L>  /  TBili  1.4<H>  /  DBili  x   /  AST  28  /  ALT  45  /  AlkPhos  91  10-14      Urinalysis Basic - ( 13 Oct 2020 21:39 )    Color: Sue / Appearance: Clear / S.010 / pH: x  Gluc: x / Ketone: Negative  / Bili: Negative / Urobili: 1   Blood: x / Protein: Negative / Nitrite: Negative   Leuk Esterase: Trace / RBC: 5-10 /HPF / WBC 6-10 /HPF   Sq Epi: x / Non Sq Epi: Moderate /HPF / Bacteria: Moderate /HPF      RADIOLOGY & ADDITIONAL TESTS:    Imaging Personally Reviewed:  YES    Plan of care was discussed with patient and /or primary care giver; all questions and concerns were addressed and care was aligned with patient's wishes.

## 2020-10-14 NOTE — DIETITIAN INITIAL EVALUATION ADULT. - NS FNS WEIGHT USED FOR CALC
other (specify)/Ht. 5' 6"  Lbs, dosing wt. 106 Lbs on 10/13/20, ""last flow sheet weight 172.8 Lbs on 4/27/20 noted

## 2020-10-14 NOTE — PROGRESS NOTE ADULT - NUTRITIONAL ASSESSMENT
This patient has been assessed with a concern for Malnutrition and has been determined to have a diagnosis/diagnoses of Severe protein-calorie malnutrition.    This patient is being managed with:   Diet Regular-  Consistent Carbohydrate {Evening Snacks}  Entered: Oct 13 2020  5:51PM    The following pending diet order is being considered for treatment of Severe protein-calorie malnutrition:  Diet Soft-  Consistent Carbohydrate {Evening Snacks}  Supplement Feeding Modality:  Oral  Glucerna Shake Cans or Servings Per Day:  1       Frequency:  Two Times a day  Entered: Oct 14 2020  1:14PM

## 2020-10-14 NOTE — DIETITIAN INITIAL EVALUATION ADULT. - PERTINENT MEDS FT
MEDICATIONS  (STANDING):  albumin human 25% IVPB 100 milliLiter(s) IV Intermittent every 6 hours  pantoprazole    Tablet 40 milliGRAM(s) Oral before breakfast  rivaroxaban 15 milliGRAM(s) Oral daily  senna 2 Tablet(s) Oral at bedtime    MEDICATIONS  (PRN):

## 2020-10-14 NOTE — CHART NOTE - NSCHARTNOTEFT_GEN_A_CORE
Upon Nutritional Assessment by the Registered Dietitian your patient was determined to meet criteria / has evidence of the following diagnosis/diagnoses:          [ ]  Mild Protein Calorie Malnutrition        [ ]  Moderate Protein Calorie Malnutrition        [X ] Severe Protein Calorie Malnutrition        [ ] Unspecified Protein Calorie Malnutrition        [ ] Underweight / BMI <19        [ ] Morbid Obesity / BMI > 40      Findings as based on:  •  Comprehensive nutrition assessment and consultation  •  Calorie counts (nutrient intake analysis)  •  Food acceptance and intake status from observations by staff  •  Follow up  •  Patient education  •  Intervention secondary to interdisciplinary rounds  •   concerns      Treatment:    The following diet has been recommended:      PROVIDER Section:     By signing this assessment you are acknowledging and agree with the diagnosis/diagnoses assigned by the Registered Dietitian    Comments: Add Glucerna Shake 1can bid as medically feasible (440kcal, 20g protein) & MVI/minerals daily

## 2020-10-14 NOTE — PROGRESS NOTE ADULT - PROBLEM SELECTOR PLAN 3
CT A/P Diffuse dilatation of small bowel loops without a clear transition is slightly increased, likely an ileus  endorses successful BM this AM

## 2020-10-14 NOTE — CONSULT NOTE ADULT - SUBJECTIVE AND OBJECTIVE BOX
pt seen at bedside, full note to follow GI INITIAL CONSULT    HPI:  65y Female from home, lives with daughter, ambulates with walker with PMH of recurrent SBO's, s/p exp lap, SB resection in 2015, ex lap, ALBINA in 2018, DVT, PE, on Xarelto, IVC filter, chronic leg swelling, and anasarca, came in to the ED after being sent by PCP Dr. Ross for generalized weakness and hypotension during office visit. Patient was seen by PCP for weakness and chills earlier today and was sent to the ED after she was noted to be hypotensive with BP 80/40. In ED pt had CT scan doesn't which showed a probable ileus. Pt stated that she has a BM everyday, last BM was yesterday, is passing flatus. Denies N/V/D, constipation, rectal bleed, melena, wt loss.     PSH:   H/O exploratory laparotomy    Meds:  MEDICATIONS  (STANDING):  pantoprazole    Tablet 40 milliGRAM(s) Oral before breakfast  rivaroxaban 15 milliGRAM(s) Oral daily  senna 2 Tablet(s) Oral at bedtime     SH: non contributory   FH: non contributory    ROS:  CONSTITUTIONAL: No fever, weight loss, or fatigue  EYES: No eye pain, visual disturbances, or discharge  ENT:  No difficulty hearing, tinnitus, vertigo; No sinus or throat pain  NECK: No pain or stiffness  RESPIRATORY: No cough, wheezing, chills or hemoptysis, shortness of Breath  CARDIOVASCULAR: No chest pain, palpitations, passing out, dizziness, or leg swelling  GASTROINTESTINAL: see HPI  GENITOURINARY: No dysuria, frequency, hematuria, or incontinence  NEUROLOGICAL: No headaches, memory loss, loss of strength, numbness, or tremors  SKIN: No itching, burning, rashes, or lesions   MUSCULOSKELETAL: No arthralgia, myalgia, or back pain.     Vitals:   Vital Signs Last 24 Hrs  T(C): 36.1 (14 Oct 2020 07:55), Max: 36.8 (13 Oct 2020 19:00)  T(F): 97 (14 Oct 2020 07:55), Max: 98.3 (13 Oct 2020 19:00)  HR: 84 (14 Oct 2020 07:55) (80 - 98)  BP: 87/45 (14 Oct 2020 07:55) (82/54 - 104/78)    Gen: NAD  CVS: S1/S2  Chest: CTABL  Abd: soft, non tender, distended, tympanic on percussion     Imaging:   EXAM:  CT ABDOMEN AND PELVIS IC                          CLINICAL INFORMATION: Peritonitis and ascites    COMPARISON: CT abdomen pelvis 5/19/2020    FINDINGS:  LOWER CHEST: Clear.    LIVER: Normal.  BILE DUCTS: Nondilated.  GALLBLADDER: Gallstones.  SPLEEN: Normal.  PANCREAS: Diffuse atrophy. 1.5 cm pancreatic versus peripancreatic nodule.  ADRENALS: Normal.  KIDNEYS/URETERS: No calculi, hydronephrosis, or renal mass.    BLADDER: Underdistended limiting evaluation.  REPRODUCTIVE ORGANS: No uterine or adnexal abnormality.    BOWEL: Diffuse dilatation of small bowel loops without a clear transition is slightly increased, likely an ileus. Colon is decompressed.  PERITONEUM: Decreased moderate ascites. No free air or collection.  VESSELS: IVC filter. Normal caliber aorta.  RETROPERITONEUM/LYMPH NODES: No adenopathy.  ABDOMINAL WALL: Diffuse body wall edema.  BONES: No acute bony abnormality.    IMPRESSION:    Diffuse dilatation of small bowel loops without a clear transition is slightly increased, likely an ileus    Decreased moderate ascites.    1.5 cm pancreatic versus peripancreatic soft tissue nodule

## 2020-10-15 NOTE — PROGRESS NOTE ADULT - PROBLEM SELECTOR PLAN 2
likely multifactorial   anemia worsened by hemodilution from NS boluses   FOBT positive  Hgb 6.6 today.  Transfuse 1 unit PRBC  Monitor CBC daily  f/u with DIPTI Felix.

## 2020-10-15 NOTE — PROGRESS NOTE ADULT - SUBJECTIVE AND OBJECTIVE BOX
Patient is seen and examined at the bed side, is afebrile. The one of the blood culture grew GNR, not finalized yet.         REVIEW OF SYSTEMS: All other review systems are negative        ALLERGIES: No Known Allergies        Vital Signs Last 24 Hrs  T(C): 36.5 (15 Oct 2020 11:35), Max: 36.6 (15 Oct 2020 07:55)  T(F): 97.7 (15 Oct 2020 11:35), Max: 97.8 (15 Oct 2020 07:55)  HR: 98 (15 Oct 2020 11:35) (80 - 98)  BP: 90/63 (15 Oct 2020 11:35) (89/64 - 123/90)  BP(mean): --  RR: 18 (15 Oct 2020 11:35) (16 - 18)  SpO2: 99% (15 Oct 2020 11:35) (99% - 100%)        PHYSICAL EXAM:  GENERAL: Not in distress, appears weak  CHEST/LUNG: Not using accessory muscles   HEART: s1 and s2 present  ABDOMEN:  Nontender and  Nondistended  EXTREMITIES: No pedal  edema  CNS: Awake and Alert        LABS:                        6.6    4.55  )-----------( 179      ( 15 Oct 2020 08:18 )             21.2                           7.8    5.41  )-----------( 211      ( 14 Oct 2020 10:16 )             26.1         10-15    142  |  113<H>  |  14  ----------------------------<  71  4.0   |  23  |  0.62    Ca    7.6<L>      15 Oct 2020 06:35  Phos  2.3     10-14  Mg     2.3     10-14    TPro  3.8<L>  /  Alb  1.7<L>  /  TBili  1.4<H>  /  DBili  x   /  AST  28  /  ALT  45  /  AlkPhos  91  10-14    10-14    143  |  112<H>  |  17  ----------------------------<  59<L>  3.7   |  23  |  0.47<L>    Ca    7.1<L>      14 Oct 2020 06:49  Phos  2.3     10-14  Mg     2.3     10-14    TPro  3.8<L>  /  Alb  1.7<L>  /  TBili  1.4<H>  /  DBili  x   /  AST  28  /  ALT  45  /  AlkPhos  91  10-14      pProcalcitonin, Serum (10.15.20 @ 11:48)   Procalcitonin, Serum: 0.16: Procalcitonin (PCT) Interpretation (ng/mL) - Diagnosis of systemic   bacterial infection/sepsis       MEDICATIONS  (STANDING):    pantoprazole  Injectable 40 milliGRAM(s) IV Push two times a day  senna 2 Tablet(s) Oral at bedtime        RADIOLOGY & ADDITIONAL TESTS:    Culture - Blood (10.13.20 @ 22:23)   Gram Stain:   Growth in anaerobic bottle: Gram Negative Rods   - Multidrug (KPC pos) resistant organism: Nondet   - Staphylococcus aureus: Nondet   - Methicillin resistant Staphylococcus aureus (MRSA): Nondet   - Coagulase negative Staphylococcus: Nondet   - Enterococcus species: Nondet   - Vancomycin resistant Enterococcus sp.: Nondet   - Escherichia coli: Nondet   - Klebsiella oxytoca: Nondet   - Klebsiella pneumoniae: Nondet   - Serratia marcescens: Nondet   - Haemophilus influenzae: Nondet   - Listeria monocytogenes: Nondet   - Neisseria meningitidis: Nondet   - Pseudomonas aeruginosa: Nondet   - Acinetobacter baumanii: Nondet   - Enterobacter cloacae complex: Nondet   - Streptococcus sp. (Not Grp A, B or S pneumoniae): Nondet   - Streptococcus agalactiae (Group B): Nondet   - Streptococcus pyogenes (Group A): Nondet   - Streptococcus pneumoniae: Nondet   - Candida albicans: Nondet   - Candida glabrata: Nondet   - Candida krusei: Nondet   - Candida parapsilosis: Nondet   - Candida tropicalis: Nondet   Specimen Source: .Blood Blood-Peripheral   Organism: Blood Culture PCR   Culture Results:   Growth in anaerobic bottle: Gram Negative Rods   ***Blood Panel PCR results on this specimen are available   approximately 3 hours after the Gram stain result.***   Gram stain, PCR, and/or culture results may not always   correspond due to difference in methodologies.     Culture - Blood (10.13.20 @ 22:23)   Specimen Source: .Blood Blood-Peripheral   Culture Results:   No growth to date.     Urine Microscopic-Add On (NC) (10.13.20 @ 21:39)   Bacteria: Moderate /HPF   Epithelial Cells: Moderate /HPF   Red Blood Cell - Urine: 5-10 /HPF   White Blood Cell - Urine: 6-10 /HPF     10/13/20 : CT Abdomen and Pelvis w/ IV Cont (10.13.20 @ 18:50) >    Diffuse dilatation of small bowel loops without a clear transition is slightly increased, likely an ileus.  Decreased moderate ascites.    1.5 cm pancreatic versus peripancreatic soft tissue nodule    10/13/20 : Xray Chest 1 View- PORTABLE-Urgent (Xray Chest 1 View- PORTABLE-Urgent .) (10.13.20 @ 16:34) Clear lungs.

## 2020-10-15 NOTE — PROGRESS NOTE ADULT - ASSESSMENT
Patient is a 65y old  Female from home, lives with daughter, ambulates with walker with PMH of recurrent SBO's, s/p exp lap, SB resection in 2015, ex lap, ALBINA in 2018, DVT, PE, on Xarelto, IVC filter, chronic leg swelling, and anasarca, Now send in to the ER by her PCP, Dr. Ross, for evaluation of  generalized weakness and hypotension BP of 80/40 during office visit. On admission, she found to have no fever and BP was in lower normal range and no Leukocytosis. The CXR is clear and CT abd/pelvis consistent with Ileus. The ID consult requested to assist with evaluation of infectious etiology of episode of hypotension.     # Hypotension  at office- BP of 80/40  # Gram Negative Bacteremia - 10/13/20 - ? source most likely GI  # Ileus  - h/o recurrent SBO and s/p EXp. LAp  # COVID 19 negative       would recommend:    1. Start on Cefepime and  Flagyl to cover GNR including Bacteroides  2. Follow up final blood cultures for ID and sensitivity  3. Repeat Blood cultures X2 in AM to document clearing the blood stream  4. Monitor kidney function and adjust Abx doses accordingly   5. OOB to chair       Attending Attestation:    Spent more than 45 minutes on total encounter, more than 50 % of the visit was spent counseling and/or coordinating care by the Attending physician.     2017

## 2020-10-15 NOTE — PROGRESS NOTE ADULT - SUBJECTIVE AND OBJECTIVE BOX
NP Note discussed with  Primary Attending    Patient is a 65y old  Female who presents with a chief complaint of Hypotension (14 Oct 2020 19:21)      HPI    65y old  Female who presents with a chief complaint of Hypotension from home, lives with daughter, ambulates with walker with PMH of recurrent SBO's, s/p exp lap, SB resection in , DVT, PE, on Xarelto, IVC filter, chronic leg swelling, and anasarca, sent in by PCP Dr. Ross for generalized weakness and hypotension during office visit BP 80/40. Admitted to medicine for hypotension s/p 4L NS in ED, s/p albumin Q6 for 24 hours.  Pending urine and blood cultures.        INTERVAL HPI/OVERNIGHT EVENTS: Pt seen and examined this morning. Pt reports  generalized weakness not better, denies SOB, no chest pain, no active bleeding. Labs notable for worsening anemia, repeat Hgb 6.6. Pt endorses h/o blood transfusions, most recent in February. Pt prefers daughter Jus to sign for consent.     MEDICATIONS  (STANDING):  pantoprazole    Tablet 40 milliGRAM(s) Oral before breakfast  senna 2 Tablet(s) Oral at bedtime    MEDICATIONS  (PRN):      __________________________________________________  REVIEW OF SYSTEMS:    CONSTITUTIONAL: generalized wakness, No fever,   EYES: no acute visual disturbances  NECK: No pain or stiffness  RESPIRATORY: No cough; No shortness of breath  CARDIOVASCULAR: No chest pain, no palpitations  GASTROINTESTINAL: No pain. No nausea or vomiting; No diarrhea   NEUROLOGICAL: No headache or numbness, no tremors  MUSCULOSKELETAL: No joint pain, no muscle pain  GENITOURINARY: no dysuria, no frequency, no hesitancy  PSYCHIATRY: no depression , no anxiety  ALL OTHER  ROS negative        Vital Signs Last 24 Hrs  T(C): 36.5 (15 Oct 2020 11:35), Max: 36.6 (15 Oct 2020 07:55)  T(F): 97.7 (15 Oct 2020 11:35), Max: 97.8 (15 Oct 2020 07:55)  HR: 98 (15 Oct 2020 11:35) (80 - 98)  BP: 90/63 (15 Oct 2020 11:35) (89/64 - 123/90)  BP(mean): --  RR: 18 (15 Oct 2020 11:35) (16 - 18)  SpO2: 99% (15 Oct 2020 11:35) (99% - 100%)    ________________________________________________  PHYSICAL EXAM:  GENERAL: NAD  HEENT: Normocephalic;  conjunctivae and sclerae clear; moist mucous membranes;   NECK : supple  CHEST/LUNG: Clear to auscultation bilaterally with good air entry   HEART: S1 S2  regular; no murmurs, gallops or rubs  ABDOMEN: Soft, Nontender, Nondistended; Bowel sounds present  EXTREMITIES: no cyanosis; no edema; no calf tenderness  SKIN: warm and dry; no rash  NERVOUS SYSTEM:  Awake and alert; Oriented  to place, person and time ; no new deficits    _________________________________________________  LABS:                        6.6    4.55  )-----------( 179      ( 15 Oct 2020 08:18 )             21.2     10-15    142  |  113<H>  |  14  ----------------------------<  71  4.0   |  23  |  0.62    Ca    7.6<L>      15 Oct 2020 06:35  Phos  2.3     10-14  Mg     2.3     10-14    TPro  3.8<L>  /  Alb  1.7<L>  /  TBili  1.4<H>  /  DBili  x   /  AST  28  /  ALT  45  /  AlkPhos  91  10-14      Urinalysis Basic - ( 13 Oct 2020 21:39 )    Color: Sue / Appearance: Clear / S.010 / pH: x  Gluc: x / Ketone: Negative  / Bili: Negative / Urobili: 1   Blood: x / Protein: Negative / Nitrite: Negative   Leuk Esterase: Trace / RBC: 5-10 /HPF / WBC 6-10 /HPF   Sq Epi: x / Non Sq Epi: Moderate /HPF / Bacteria: Moderate /HPF      CAPILLARY BLOOD GLUCOSE            RADIOLOGY & ADDITIONAL TESTS:    Imaging Personally Reviewed:  YES/NO    Consultant(s) Notes Reviewed:   YES/ No    Care Discussed with Consultants :     Plan of care was discussed with patient and /or primary care giver; all questions and concerns were addressed and care was aligned with patient's wishes.

## 2020-10-15 NOTE — PROGRESS NOTE ADULT - PROBLEM SELECTOR PLAN 5
RISK                                                          Points  [  ] Previous VTE                                                3  [  ] Thrombophilia                                             2  [  ] Lower limb paralysis                                   2        (unable to hold up >15 seconds)    [  ] Current Cancer                                             2         (within 6 months)  [ x ] Immobilization > 24 hrs                              1  [  ] ICU/CCU stay > 24 hours                             1  [ x ] Age > 60                                                         1    IMPROVE VTE Score: 2  Xarelto discontinued In setting of worsening anemia, FOBT (+)  SCD's

## 2020-10-15 NOTE — PROGRESS NOTE ADULT - PROBLEM SELECTOR PLAN 3
CT A/P Diffuse dilatation of small bowel loops without a clear transition is slightly increased, likely an ileus  Has BM's, last one yesterday

## 2020-10-16 NOTE — PROGRESS NOTE ADULT - SUBJECTIVE AND OBJECTIVE BOX
Patient was seen and examined  Patient is a 65y old  Female who presents with a chief complaint of Hypotension (15 Oct 2020 14:35)      INTERVAL HPI/OVERNIGHT EVENTS:  T(C): 36.8 (10-16-20 @ 08:32), Max: 36.8 (10-15-20 @ 23:02)  HR: 90 (10-16-20 @ 08:32) (88 - 98)  BP: 149/65 (10-16-20 @ 08:32) (90/63 - 149/65)  RR: 18 (10-16-20 @ 08:32) (18 - 18)  SpO2: 97% (10-16-20 @ 08:32) (97% - 100%)  Wt(kg): --  I&O's Summary      LABS:                        9.1    6.08  )-----------( 199      ( 16 Oct 2020 06:44 )             27.7     10-16    144  |  115<H>  |  14  ----------------------------<  75  3.3<L>   |  25  |  0.64    Ca    7.6<L>      16 Oct 2020 06:44  Mg     1.6     10-16          CAPILLARY BLOOD GLUCOSE        LIPID PANEL  Cholesterol 67  LDL 41  HDL 5  RATIO HDL/Total Cholesterol --  Triglyceride 106            MEDICATIONS  (STANDING):  cefepime   IVPB      cefepime   IVPB 1000 milliGRAM(s) IV Intermittent every 8 hours  metroNIDAZOLE  IVPB 500 milliGRAM(s) IV Intermittent every 8 hours  metroNIDAZOLE  IVPB      pantoprazole  Injectable 40 milliGRAM(s) IV Push two times a day  potassium chloride    Tablet ER 40 milliEquivalent(s) Oral once  senna 2 Tablet(s) Oral at bedtime    MEDICATIONS  (PRN):      RADIOLOGY & ADDITIONAL TESTS:    Imaging Personally Reviewed:  [ ] YES  [ ] NO    REVIEW OF SYSTEMS:  CONSTITUTIONAL: No fever, weight loss, or fatigue  EYES: No eye pain, visual disturbances, or discharge  ENMT:  No difficulty hearing, tinnitus, vertigo; No sinus or throat pain  NECK: No pain or stiffness  BREASTS: No pain, masses, or nipple discharge  RESPIRATORY: No cough, wheezing, chills or hemoptysis; No shortness of breath  CARDIOVASCULAR: No chest pain, palpitations, dizziness, or leg swelling  GASTROINTESTINAL: No abdominal or epigastric pain. No nausea, vomiting, or hematemesis; No diarrhea or constipation. No melena or hematochezia.  GENITOURINARY: No dysuria, frequency, hematuria, or incontinence  NEUROLOGICAL: No headaches, memory loss, loss of strength, numbness, or tremors  SKIN: No itching, burning, rashes, or lesions   LYMPH NODES: No enlarged glands  ENDOCRINE: No heat or cold intolerance; No hair loss  MUSCULOSKELETAL: No joint pain or swelling; No muscle, back, or extremity pain  PSYCHIATRIC: No depression, anxiety, mood swings, or difficulty sleeping  HEME/LYMPH: No easy bruising, or bleeding gums  ALLERY AND IMMUNOLOGIC: No hives or eczema      Consultant(s) Notes Reviewed:  [ x ] YES  [ ] NO    PHYSICAL EXAM:  GENERAL: NAD, well-groomed, well-developed  HEAD:  Atraumatic, Normocephalic  EYES: EOMI, PERRLA, conjunctiva and sclera clear  ENMT: No tonsillar erythema, exudates, or enlargement; Moist mucous membranes, Good dentition, No lesions  NECK: Supple, No JVD, Normal thyroid  NERVOUS SYSTEM:  Alert & Oriented X3, Good concentration; Motor Strength 5/5 B/L upper and lower extremities; DTRs 2+ intact and symmetric  CHEST/LUNG: Clear to percussion bilaterally; No rales, rhonchi, wheezing, or rubs  HEART: Regular rate and rhythm; No murmurs, rubs, or gallops  ABDOMEN: Soft, Nontender, Nondistended; Bowel sounds present  EXTREMITIES:  2+ Peripheral Pulses, No clubbing, cyanosis, or edema  LYMPH: No lymphadenopathy noted  SKIN: No rashes or lesions    Care Discussed with Consultants/Other Providers [ x] YES  [ ] NO

## 2020-10-16 NOTE — PROGRESS NOTE ADULT - NUTRITIONAL ASSESSMENT
This patient has been assessed with a concern for Malnutrition and has been determined to have a diagnosis/diagnoses of Severe protein-calorie malnutrition.    This patient is being managed with:   Diet Soft-  Consistent Carbohydrate {Evening Snacks}  Supplement Feeding Modality:  Oral  Glucerna Shake Cans or Servings Per Day:  1       Frequency:  Two Times a day  Entered: Oct 14 2020  1:14PM

## 2020-10-16 NOTE — PROGRESS NOTE ADULT - PROBLEM SELECTOR PLAN 1
likely due to infection/bacteremia, vs acute CVA vs metabolic  BG 93  BC growing GNR  UC growing >/=3 micro-organisms , likely contaminated  COVID 19 PCR not detected  afebrile, no leukocytosis  Procalcitonin elevated at 0.16  c/w Cefepime/Flagyl, Day 2  f/u head CT  f/u BC sensitivities

## 2020-10-16 NOTE — PROGRESS NOTE ADULT - NUTRITIONAL ASSESSMENT
This patient has been assessed with a concern for Malnutrition and has been determined to have a diagnosis/diagnoses of Severe protein-calorie malnutrition.    This patient is being managed with:   Diet Regular-  Consistent Carbohydrate {Evening Snacks}  Entered: Oct 13 2020  5:51PM    The following pending diet order is being considered for treatment of Severe protein-calorie malnutrition:  Diet Soft-  Consistent Carbohydrate {Evening Snacks}  Supplement Feeding Modality:  Oral  Glucerna Shake Cans or Servings Per Day:  1       Frequency:  Two Times a day  Entered: Oct 14 2020  1:14PM    HYPOKALEMIA REPLACE

## 2020-10-16 NOTE — PROGRESS NOTE ADULT - PROBLEM SELECTOR PLAN 2
afebrile, no leukocytosis  elevated procalcitonin  c/w Cefepime/Flagyl , Day 2  f/u BC sensitivities  ID Dr. Patricio following

## 2020-10-16 NOTE — PROGRESS NOTE ADULT - ASSESSMENT
66 YO F with CT abd showing probable ileus, continues to have BM and pass flatus, abd continues to be tympanic and mildly distended, now anemic with HgB that was 6.6 and now 9.1 after 1 units pRBC, upon rectal exam done by me this morning pt had brown stool on white gloved hand     #Ileus   - continue reglan   - can give docusate 10mg qhs     #Anemia   - most likely multifactorial in nature   - pt had brown stool on white gloved hand, no signs of overt bleeding at this time   - continue to monitor CBC and transfuse if HgB <7  - rest of care as per primary team 64 YO F with CT abd showing probable ileus, continues to have BM and pass flatus, abd continues to be tympanic and mildly distended, now anemic with HgB that was 6.6 and now 9.1 after 1 units pRBC, upon rectal exam done by me this morning pt had brown stool on white gloved hand     #Ileus   - continue reglan   - can give docusate 10mg qhs     #Anemia   - multifactorial in nature   - no signs of melena or hematochezia on rectal exam; brown stool on rectal exam  - pt does not appear to have clinically significant GI bleeding at this time  - consult hematology for anemia  - continue to monitor CBC and transfuse if HgB <7  - rest of care as per primary team

## 2020-10-16 NOTE — PROGRESS NOTE ADULT - ASSESSMENT
Patient is a 65y old  Female from home, lives with daughter, ambulates with walker with PMH of recurrent SBO's, s/p exp lap, SB resection in 2015, ex lap, ALBINA in 2018, DVT, PE, on Xarelto, IVC filter, chronic leg swelling, and anasarca, Now send in to the ER by her PCP, Dr. Ross, for evaluation of  generalized weakness and hypotension BP of 80/40 during office visit. On admission, she found to have no fever and BP was in lower normal range and no Leukocytosis. The CXR is clear and CT abd/pelvis consistent with Ileus. The ID consult requested to assist with evaluation of infectious etiology of episode of hypotension.     # Hypotension  at office- BP of 80/40 - resolved   # Gram Negative Bacteremia - 10/13/20 - ? source most likely GI  # Ileus  - h/o recurrent SBO and s/p EXp. LAp  # COVID 19 negative       would recommend:    1. Follow up Repeat Blood cultures  to document clearing the blood stream  2. Continue Cefepime and  Flagyl to cover GNR including Bacteroides  3. Follow up final blood cultures for ID and sensitivity  4. Monitor kidney function and adjust Abx doses accordingly   5. OOB to chair       Attending Attestation:    Spent more than 45 minutes on total encounter, more than 50 % of the visit was spent counseling and/or coordinating care by the Attending physician.

## 2020-10-16 NOTE — PROGRESS NOTE ADULT - SUBJECTIVE AND OBJECTIVE BOX
NP Note discussed with  Primary Attending    Patient is a 65y old  Female who presents with a chief complaint of Hypotension (16 Oct 2020 09:25)    HPI  65y old  Female who presents with a chief complaint of Hypotension from home, lives with daughter, ambulates with walker with PMH of recurrent SBO's, s/p exp lap, SB resection in 2015, DVT, PE, on Xarelto, IVC filter, chronic leg swelling, and anasarca, sent in by PCP Dr. Ross for generalized weakness and hypotension during office visit BP 80/40. Admitted to medicine for hypotension s/p 4L NS in ED, s/p albumin Q6 for 24 hours. BC/UC sent . Pt BP improved to  SBP . Hospital course complicated by worsening anemia , Hgb 6.6 for which 1 unit PRBC was transfused. Hgb increased to 9.4; 9.1 today. FOBT (+) for which GI reconsulted.Xarelto discontinued.  PPI changed to IV BID. Now with bacteremia for which Cefepime /Flagyl IV initiated. ID Dr. Patricio following.       INTERVAL HPI/OVERNIGHT EVENTS: Pt seen and examined this morning. Pt awake, slow to respond, follows commands, able to sit up and move all extremities. Pt denies SOB, chest pain, abdominal discomfort.    MEDICATIONS  (STANDING):  cefepime   IVPB      cefepime   IVPB 1000 milliGRAM(s) IV Intermittent every 8 hours  metroNIDAZOLE  IVPB 500 milliGRAM(s) IV Intermittent every 8 hours  metroNIDAZOLE  IVPB      pantoprazole  Injectable 40 milliGRAM(s) IV Push two times a day  potassium chloride    Tablet ER 40 milliEquivalent(s) Oral once  senna 2 Tablet(s) Oral at bedtime    MEDICATIONS  (PRN):      __________________________________________________  REVIEW OF SYSTEMS:    CONSTITUTIONAL: No fever,   EYES: no acute visual disturbances  NECK: No pain or stiffness  RESPIRATORY: No cough; No shortness of breath  CARDIOVASCULAR: No chest pain, no palpitations  GASTROINTESTINAL: No pain. No nausea or vomiting; No diarrhea   NEUROLOGICAL: No headache or numbness, no tremors  MUSCULOSKELETAL: No joint pain, no muscle pain  GENITOURINARY: no dysuria, no frequency, no hesitancy  PSYCHIATRY: no depression , no anxiety  ALL OTHER  ROS negative        Vital Signs Last 24 Hrs  T(C): 36.8 (16 Oct 2020 08:32), Max: 36.8 (15 Oct 2020 23:02)  T(F): 98.2 (16 Oct 2020 08:32), Max: 98.2 (15 Oct 2020 23:02)  HR: 90 (16 Oct 2020 08:32) (88 - 98)  BP: 149/65 (16 Oct 2020 08:32) (90/63 - 149/65)  BP(mean): --  RR: 18 (16 Oct 2020 08:32) (18 - 18)  SpO2: 97% (16 Oct 2020 08:32) (97% - 100%)    ________________________________________________  PHYSICAL EXAM:  GENERAL: lethargic  HEENT: Normocephalic;  conjunctivae and sclerae clear; moist mucous membranes;   NECK : supple  CHEST/LUNG: Clear to auscultation bilaterally with good air entry   HEART: S1 S2  regular; no murmurs, gallops or rubs  ABDOMEN: Soft, Nontender, Nondistended; Bowel sounds present  EXTREMITIES: no cyanosis; no edema; no calf tenderness  SKIN: warm and dry; no rash  NERVOUS SYSTEM:  Lethargic, awaket; Oriented  to  person.; no new deficits    _________________________________________________  LABS:                        9.1    6.08  )-----------( 199      ( 16 Oct 2020 06:44 )             27.7     10-16    144  |  115<H>  |  14  ----------------------------<  75  3.3<L>   |  25  |  0.64    Ca    7.6<L>      16 Oct 2020 06:44  Mg     1.6     10-16          CAPILLARY BLOOD GLUCOSE            RADIOLOGY & ADDITIONAL TESTS:    Imaging Personally Reviewed:  YES/NO    Consultant(s) Notes Reviewed:   YES/ No    Care Discussed with Consultants :     Plan of care was discussed with patient and /or primary care giver; all questions and concerns were addressed and care was aligned with patient's wishes.     NP Note discussed with  Primary Attending    Patient is a 65y old  Female who presents with a chief complaint of Hypotension (16 Oct 2020 09:25)    HPI  65y old  Female who presents with a chief complaint of Hypotension from home, lives with daughter, ambulates with walker with PMH of recurrent SBO's, s/p exp lap, SB resection in 2015, DVT, PE, on Xarelto, IVC filter, chronic leg swelling, and anasarca, sent in by PCP Dr. Ross for generalized weakness and hypotension during office visit BP 80/40. Admitted to medicine for hypotension s/p 4L NS in ED, s/p albumin Q6 for 24 hours. BC/UC sent . Pt BP improved to  SBP . Hospital course complicated by worsening anemia , Hgb 6.6 for which 1 unit PRBC was transfused. Hgb increased to 9.4; 9.1 today. FOBT (+) for which GI reconsulted.Xarelto discontinued.  PPI changed to IV BID. Now with bacteremia for which Cefepime /Flagyl IV initiated. ID Dr. Patricio following.       INTERVAL HPI/OVERNIGHT EVENTS: Pt seen and examined this morning. Pt awake, slow to respond, follows commands, able to sit up and move all extremities. Pt denies SOB, chest pain, abdominal discomfort. RN reports pt was confused overnight. BG 93    MEDICATIONS  (STANDING):  cefepime   IVPB      cefepime   IVPB 1000 milliGRAM(s) IV Intermittent every 8 hours  metroNIDAZOLE  IVPB 500 milliGRAM(s) IV Intermittent every 8 hours  metroNIDAZOLE  IVPB      pantoprazole  Injectable 40 milliGRAM(s) IV Push two times a day  potassium chloride    Tablet ER 40 milliEquivalent(s) Oral once  senna 2 Tablet(s) Oral at bedtime    MEDICATIONS  (PRN):      __________________________________________________  REVIEW OF SYSTEMS:    CONSTITUTIONAL: No fever,   EYES: no acute visual disturbances  NECK: No pain or stiffness  RESPIRATORY: No cough; No shortness of breath  CARDIOVASCULAR: No chest pain, no palpitations  GASTROINTESTINAL: No pain. No nausea or vomiting; No diarrhea   NEUROLOGICAL: No headache or numbness, no tremors  MUSCULOSKELETAL: No joint pain, no muscle pain  GENITOURINARY: no dysuria, no frequency, no hesitancy  PSYCHIATRY: no depression , no anxiety  ALL OTHER  ROS negative        Vital Signs Last 24 Hrs  T(C): 36.8 (16 Oct 2020 08:32), Max: 36.8 (15 Oct 2020 23:02)  T(F): 98.2 (16 Oct 2020 08:32), Max: 98.2 (15 Oct 2020 23:02)  HR: 90 (16 Oct 2020 08:32) (88 - 98)  BP: 149/65 (16 Oct 2020 08:32) (90/63 - 149/65)  BP(mean): --  RR: 18 (16 Oct 2020 08:32) (18 - 18)  SpO2: 97% (16 Oct 2020 08:32) (97% - 100%)    ________________________________________________  PHYSICAL EXAM:  GENERAL: lethargic  HEENT: Normocephalic;  conjunctivae and sclerae clear; moist mucous membranes;   NECK : supple  CHEST/LUNG: Clear to auscultation bilaterally with good air entry   HEART: S1 S2  regular; no murmurs, gallops or rubs  ABDOMEN: Soft, Nontender, Nondistended; Bowel sounds present  EXTREMITIES: no cyanosis; no edema; no calf tenderness  SKIN: warm and dry; no rash  NERVOUS SYSTEM:  Lethargic, awaket; Oriented  to  person.; no new deficits    _________________________________________________  LABS:                        9.1    6.08  )-----------( 199      ( 16 Oct 2020 06:44 )             27.7     10-16    144  |  115<H>  |  14  ----------------------------<  75  3.3<L>   |  25  |  0.64    Ca    7.6<L>      16 Oct 2020 06:44  Mg     1.6     10-16          CAPILLARY BLOOD GLUCOSE            RADIOLOGY & ADDITIONAL TESTS:    Imaging Personally Reviewed:  YES/NO    Consultant(s) Notes Reviewed:   YES/ No    Care Discussed with Consultants :     Plan of care was discussed with patient and /or primary care giver; all questions and concerns were addressed and care was aligned with patient's wishes.

## 2020-10-16 NOTE — PROGRESS NOTE ADULT - SUBJECTIVE AND OBJECTIVE BOX
Patient is seen and examined at the bed side, is afebrile.  She mentioned  feeling better. The blood culture grew GNR, not finalized yet.         REVIEW OF SYSTEMS: All other review systems are negative        ALLERGIES: No Known Allergies        Vital Signs Last 24 Hrs  T(C): 36.4 (16 Oct 2020 16:11), Max: 36.8 (15 Oct 2020 23:02)  T(F): 97.5 (16 Oct 2020 16:11), Max: 98.2 (15 Oct 2020 23:02)  HR: 92 (16 Oct 2020 16:11) (88 - 92)  BP: 116/83 (16 Oct 2020 16:11) (102/60 - 149/65)  BP(mean): --  RR: 18 (16 Oct 2020 16:11) (18 - 18)  SpO2: 100% (16 Oct 2020 16:11) (97% - 100%)        PHYSICAL EXAM:  GENERAL: Not in distress  CHEST/LUNG: Not using accessory muscles   HEART: s1 and s2 present  ABDOMEN:  Nontender and  Nondistended  EXTREMITIES: No pedal  edema  CNS: Awake and Alert        LABS:                                   9.1    6.08  )-----------( 199      ( 16 Oct 2020 06:44 )             27.7                6.6    4.55  )-----------( 179      ( 15 Oct 2020 08:18 )             21.2       10-16    144  |  115<H>  |  14  ----------------------------<  75  3.3<L>   |  25  |  0.64    Ca    7.6<L>      16 Oct 2020 06:44  Mg     1.6     10-16      10-15    142  |  113<H>  |  14  ----------------------------<  71  4.0   |  23  |  0.62    Ca    7.6<L>      15 Oct 2020 06:35  Phos  2.3     10-14  Mg     2.3     10-14    TPro  3.8<L>  /  Alb  1.7<L>  /  TBili  1.4<H>  /  DBili  x   /  AST  28  /  ALT  45  /  AlkPhos  91  10-14      Procalcitonin, Serum (10.15.20 @ 11:48)   Procalcitonin, Serum: 0.16: Procalcitonin (PCT) Interpretation (ng/mL) - Diagnosis of systemic   bacterial infection/sepsis       MEDICATIONS  (STANDING):    cefepime   IVPB      cefepime   IVPB 1000 milliGRAM(s) IV Intermittent every 8 hours  lactulose Syrup 20 Gram(s) Oral every 4 hours  metroNIDAZOLE  IVPB 500 milliGRAM(s) IV Intermittent every 8 hours  metroNIDAZOLE  IVPB      pantoprazole  Injectable 40 milliGRAM(s) IV Push two times a day  senna 2 Tablet(s) Oral at bedtime        RADIOLOGY & ADDITIONAL TESTS:      Culture - Blood (10.13.20 @ 22:23)   Gram Stain:   Growth in anaerobic bottle: Gram Negative Rods   - Multidrug (KPC pos) resistant organism: Nondet   - Staphylococcus aureus: Nondet   - Methicillin resistant Staphylococcus aureus (MRSA): Nondet   - Coagulase negative Staphylococcus: Nondet   - Enterococcus species: Nondet   - Vancomycin resistant Enterococcus sp.: Nondet   - Escherichia coli: Nondet   - Klebsiella oxytoca: Nondet   - Klebsiella pneumoniae: Nondet   - Serratia marcescens: Nondet   - Haemophilus influenzae: Nondet   - Listeria monocytogenes: Nondet   - Neisseria meningitidis: Nondet   - Pseudomonas aeruginosa: Nondet   - Acinetobacter baumanii: Nondet   - Enterobacter cloacae complex: Nondet   - Streptococcus sp. (Not Grp A, B or S pneumoniae): Nondet   - Streptococcus agalactiae (Group B): Nondet   - Streptococcus pyogenes (Group A): Nondet   - Streptococcus pneumoniae: Nondet   - Candida albicans: Nondet   - Candida glabrata: Nondet   - Candida krusei: Nondet   - Candida parapsilosis: Nondet   - Candida tropicalis: Nondet   Specimen Source: .Blood Blood-Peripheral   Organism: Blood Culture PCR   Culture Results:   Growth in anaerobic bottle: Gram Negative Rods   ***Blood Panel PCR results on this specimen are available   approximately 3 hours after the Gram stain result.***   Gram stain, PCR, and/or culture results may not always   correspond due to difference in methodologies.     Culture - Blood (10.13.20 @ 22:23)   Specimen Source: .Blood Blood-Peripheral   Culture Results:   No growth to date.     Urine Microscopic-Add On (NC) (10.13.20 @ 21:39)   Bacteria: Moderate /HPF   Epithelial Cells: Moderate /HPF   Red Blood Cell - Urine: 5-10 /HPF   White Blood Cell - Urine: 6-10 /HPF     10/13/20 : CT Abdomen and Pelvis w/ IV Cont (10.13.20 @ 18:50) >    Diffuse dilatation of small bowel loops without a clear transition is slightly increased, likely an ileus.  Decreased moderate ascites.    1.5 cm pancreatic versus peripancreatic soft tissue nodule    10/13/20 : Xray Chest 1 View- PORTABLE-Urgent (Xray Chest 1 View- PORTABLE-Urgent .) (10.13.20 @ 16:34) Clear lungs.

## 2020-10-16 NOTE — PROGRESS NOTE ADULT - PROBLEM SELECTOR PLAN 4
likely due to infection/bacteremia vs anemia vs hypoalbuminemia  Improved after  Albumin IV x 24hrs and  1 unit PRBC  c/w abx for bacteremia  Monitor BP

## 2020-10-16 NOTE — CONSULT NOTE ADULT - ASSESSMENT
Presumed encephalopathy in patient with hypotension, bacteremia, anemia and possible GI bleed without any focal neurological signs on exam and CTH, the symptoms are likely due to toxic metabolic encephalopathy.  Will recommend that the primary team continue to treat the medical issue.  Keep curtains open during the day and closed at night with frequent reoreintation to minimize delerium.    Jose Daniel Ross

## 2020-10-16 NOTE — PROGRESS NOTE ADULT - SUBJECTIVE AND OBJECTIVE BOX
pt seen and examined at bedside, full note to follow Pt seen this AM, in NAD, stated that she continues to have BM and passing flatus, however does seem to be more lethargic then last encounter with pt. Denies abd pain, rectal bleed, melena, N/V/D.     Vital Signs Last 24 Hrs  T(C): 36.8 (16 Oct 2020 08:32), Max: 36.8 (15 Oct 2020 23:02)  T(F): 98.2 (16 Oct 2020 08:32), Max: 98.2 (15 Oct 2020 23:02)  HR: 90 (16 Oct 2020 08:32) (88 - 98)  BP: 149/65 (16 Oct 2020 08:32) (90/63 - 149/65)    Cardio: S1S2 present  Resp: lungs clear and equal b/l  Abd: mild distention, soft, non tender, tympanic   Rectal: brown stool on white gloved hand                           9.1    6.08  )-----------( 199      ( 16 Oct 2020 06:44 )             27.7   10-16    144  |  115<H>  |  14  ----------------------------<  75  3.3<L>   |  25  |  0.64    Ca    7.6<L>      16 Oct 2020 06:44  Mg     1.6     10-16        MEDICATIONS  (STANDING):  cefepime   IVPB      cefepime   IVPB 1000 milliGRAM(s) IV Intermittent every 8 hours  metroNIDAZOLE  IVPB 500 milliGRAM(s) IV Intermittent every 8 hours  metroNIDAZOLE  IVPB      pantoprazole  Injectable 40 milliGRAM(s) IV Push two times a day  potassium chloride    Tablet ER 40 milliEquivalent(s) Oral once  senna 2 Tablet(s) Oral at bedtime

## 2020-10-16 NOTE — PROGRESS NOTE ADULT - PROBLEM SELECTOR PLAN 3
likely multifactorial (chronic disease)  FOBT (+)   Xarelto discontinued  Hgb improved after 1 unit PRBC on 10/15  no overt bleeding  c/w PPI  Monitor CBC and transfuse if HgB <7  Appreciate GI input

## 2020-10-16 NOTE — PROGRESS NOTE ADULT - PROBLEM SELECTOR PLAN 6
CT A/P Diffuse dilatation of small bowel loops without a clear transition is slightly increased, likely an ileus  having BM's , last one 2 days ago.  c/w Senna  GI following

## 2020-10-16 NOTE — PROGRESS NOTE ADULT - PROBLEM SELECTOR PLAN 5
home regimen Xarelto on hold in setting of worsened anemia with (+) FOBT , concerning for GIB  SCD's

## 2020-10-17 NOTE — DISCHARGE NOTE PROVIDER - NSDCCPCAREPLAN_GEN_ALL_CORE_FT
PRINCIPAL DISCHARGE DIAGNOSIS  Diagnosis: Hypotension  Assessment and Plan of Treatment: You were admitted with low blood pressure.  Make sure to report any changes in your condition such as increasing weakness, heart palpitation, syncope/passing out.  Keep yourself well hydrated and make sure to move slowly when changing position for laying down to standing up.       PRINCIPAL DISCHARGE DIAGNOSIS  Diagnosis: Hypotension  Assessment and Plan of Treatment: You were admitted with low blood pressure.  Make sure to report any changes in your condition such as increasing weakness, heart palpitation, syncope/passing out.  Keep yourself well hydrated and make sure to move slowly when changing position for laying down to standing up.      SECONDARY DISCHARGE DIAGNOSES  Diagnosis: Bacteremia  Assessment and Plan of Treatment: You were found to have an infection in the blood. You were treated with IV antibiotics. You have completed your course. Seek immediate medical attention if you develop fevers, chills, or worsening hypotension.    Diagnosis: DVT (deep venous thrombosis)  Assessment and Plan of Treatment: DVT (deep venous thrombosis)    Diagnosis: Ileus  Assessment and Plan of Treatment: Ileus     PRINCIPAL DISCHARGE DIAGNOSIS  Diagnosis: Hypotension  Assessment and Plan of Treatment: You were admitted with low blood pressure. This was likely due to infection. Make sure to report any changes in your condition such as increasing weakness, heart palpitation, syncope/passing out.  Keep yourself well hydrated and make sure to move slowly when changing position for laying down to standing up.      SECONDARY DISCHARGE DIAGNOSES  Diagnosis: Anemia  Assessment and Plan of Treatment: You were transfused 1 unit PRBC during your hospital stay. Your hemoglobin is now stable.  You are too high risk for colonoscopy. Follow up with GI within one week to schedule a virtual colonoscopy. Seek immediate medical attention if you develop bloody, dark, or tarry stools as these may be signs of GIB.    Diagnosis: Bacteremia  Assessment and Plan of Treatment: You were found to have an infection in the blood. You were treated with IV antibiotics. You have completed your course. The bacteria that was in your blood likley came from your GI tract. You are too high risk to have a colonosocpy. Follow up with GI within one week to schedule a virtual colonoscopy. Seek immediate medical attention if you develop fevers, chills, or worsening hypotension.    Diagnosis: DVT (deep venous thrombosis)  Assessment and Plan of Treatment: Your blood thinner was stopped due to GIB and anemia. It has since been restarted. Follow up with your primary care doctor within one week.    Diagnosis: Ileus  Assessment and Plan of Treatment: You were found to have an ileus on your CT scan. You are eating and drinking and having gas and bowel movements. Follow up with your GI doctor within one week. Seek immediate medical attention if you develop abdominal distension, abdominal pain, nausea or vomiting.     PRINCIPAL DISCHARGE DIAGNOSIS  Diagnosis: Hypotension  Assessment and Plan of Treatment: You were admitted with low blood pressure. This was likely due to infection. Make sure to report any changes in your condition such as increasing weakness, heart palpitation, syncope/passing out.  Keep yourself well hydrated and make sure to move slowly when changing position for laying down to standing up.      SECONDARY DISCHARGE DIAGNOSES  Diagnosis: Sacral wound  Assessment and Plan of Treatment: Sacral wound Your sacral wound was surgically debrided. A MRI confirmed that you have a bone infection. You were treated with IV antibiotics in the hospital. You need to take oral antibiotics thru 12/29.    Diagnosis: Anemia  Assessment and Plan of Treatment: You were transfused 1 unit PRBC during your hospital stay. Your hemoglobin is now stable.  You are too high risk for colonoscopy. Follow up with GI within one week to schedule a virtual colonoscopy. Seek immediate medical attention if you develop bloody, dark, or tarry stools as these may be signs of GIB.    Diagnosis: Bacteremia  Assessment and Plan of Treatment: You were found to have an infection in the blood. You were treated with IV antibiotics. You have completed your course. The bacteria that was in your blood likley came from your GI tract. You are too high risk to have a colonosocpy. Follow up with GI within one week to schedule a virtual colonoscopy. Seek immediate medical attention if you develop fevers, chills, or worsening hypotension.    Diagnosis: DVT (deep venous thrombosis)  Assessment and Plan of Treatment: Your blood thinner was stopped due to GIB and anemia. It has since been restarted. Follow up with your primary care doctor within one week.    Diagnosis: Ileus  Assessment and Plan of Treatment: You were found to have an ileus on your CT scan. You are eating and drinking and having gas and bowel movements. Follow up with your GI doctor within one week. Seek immediate medical attention if you develop abdominal distension, abdominal pain, nausea or vomiting.

## 2020-10-17 NOTE — DISCHARGE NOTE PROVIDER - NSDCMRMEDTOKEN_GEN_ALL_CORE_FT
docusate sodium 100 mg oral tablet: 1 tab(s) orally 2 times a day  Lasix 40 mg oral tablet: 1 tab(s) orally once a day  pantoprazole 40 mg oral delayed release tablet: 1 tab(s) orally once a day  senna oral tablet: 2 tab(s) orally once a day (at bedtime)  Xarelto 15 mg oral tablet: 1 tab(s) orally once a day (in the evening)   albuterol 90 mcg/inh inhalation aerosol: 2 puff(s) inhaled every 6 hours, As needed, Shortness of Breath and/or Wheezing  ascorbic acid 500 mg oral tablet: 1 tab(s) orally 2 times a day  collagenase 250 units/g topical ointment: 1 application topically once a day  docusate sodium 100 mg oral tablet: 1 tab(s) orally 2 times a day  fluconazole 200 mg oral tablet: 1 tab(s) orally once a day  furosemide 20 mg oral tablet: 1 tab(s) orally once a day  lactobacillus acidophilus oral capsule: 1 cap(s) orally once a day  Multiple Vitamins with Minerals oral tablet: 1 tab(s) orally once a day  pantoprazole 40 mg oral delayed release tablet: 1 tab(s) orally once a day  senna oral tablet: 2 tab(s) orally once a day (at bedtime)  Xarelto 15 mg oral tablet: 1 tab(s) orally once a day (in the evening)  zinc sulfate 220 mg oral capsule: 1 cap(s) orally once a day

## 2020-10-17 NOTE — PROGRESS NOTE ADULT - SUBJECTIVE AND OBJECTIVE BOX
Patient was seen and examined  Patient is a 65y old  Female who presents with a chief complaint of Hypotension (16 Oct 2020 19:32)      INTERVAL HPI/OVERNIGHT EVENTS:  T(C): 36.8 (10-17-20 @ 08:02), Max: 36.8 (10-17-20 @ 00:10)  HR: 101 (10-17-20 @ 08:02) (92 - 101)  BP: 97/65 (10-17-20 @ 08:02) (97/65 - 116/83)  RR: 16 (10-17-20 @ 08:02) (16 - 18)  SpO2: 97% (10-17-20 @ 08:02) (97% - 100%)  Wt(kg): --  I&O's Summary      LABS:                        8.9    5.81  )-----------( 163      ( 17 Oct 2020 06:33 )             26.2     10-17    147<H>  |  118<H>  |  13  ----------------------------<  94  3.3<L>   |  23  |  0.72    Ca    7.8<L>      17 Oct 2020 06:33  Mg     1.6     10-17    TPro  4.0<L>  /  Alb  1.8<L>  /  TBili  1.6<H>  /  DBili  x   /  AST  34  /  ALT  46  /  AlkPhos  90  10-17        CAPILLARY BLOOD GLUCOSE      POCT Blood Glucose.: 93 mg/dL (16 Oct 2020 10:21)              MEDICATIONS  (STANDING):  cefepime   IVPB      cefepime   IVPB 1000 milliGRAM(s) IV Intermittent every 8 hours  lactulose Syrup 20 Gram(s) Oral every 4 hours  metroNIDAZOLE  IVPB 500 milliGRAM(s) IV Intermittent every 8 hours  metroNIDAZOLE  IVPB      pantoprazole  Injectable 40 milliGRAM(s) IV Push two times a day  senna 2 Tablet(s) Oral at bedtime    MEDICATIONS  (PRN):      RADIOLOGY & ADDITIONAL TESTS:    Imaging Personally Reviewed:  [ ] YES  [ ] NO    REVIEW OF SYSTEMS:  CONSTITUTIONAL: No fever, weight loss, or fatigue  EYES: No eye pain, visual disturbances, or discharge  ENMT:  No difficulty hearing, tinnitus, vertigo; No sinus or throat pain  NECK: No pain or stiffness  BREASTS: No pain, masses, or nipple discharge  RESPIRATORY: No cough, wheezing, chills or hemoptysis; No shortness of breath  CARDIOVASCULAR: No chest pain, palpitations, dizziness, or leg swelling  GASTROINTESTINAL: No abdominal or epigastric pain. No nausea, vomiting, or hematemesis; No diarrhea or constipation. No melena or hematochezia.  GENITOURINARY: No dysuria, frequency, hematuria, or incontinence  NEUROLOGICAL: No headaches, memory loss, loss of strength, numbness, or tremors  SKIN: No itching, burning, rashes, or lesions   LYMPH NODES: No enlarged glands  ENDOCRINE: No heat or cold intolerance; No hair loss  MUSCULOSKELETAL: No joint pain or swelling; No muscle, back, or extremity pain  PSYCHIATRIC: No depression, anxiety, mood swings, or difficulty sleeping  HEME/LYMPH: No easy bruising, or bleeding gums  ALLERY AND IMMUNOLOGIC: No hives or eczema      Consultant(s) Notes Reviewed:  [ x ] YES  [ ] NO    PHYSICAL EXAM:  GENERAL: NAD, well-groomed, well-developed  HEAD:  Atraumatic, Normocephalic  EYES: EOMI, PERRLA, conjunctiva and sclera clear  ENMT: No tonsillar erythema, exudates, or enlargement; Moist mucous membranes, Good dentition, No lesions  NECK: Supple, No JVD, Normal thyroid  NERVOUS SYSTEM:  Alert & Oriented X3, Good concentration; Motor Strength 5/5 B/L upper and lower extremities; DTRs 2+ intact and symmetric  CHEST/LUNG: Clear to percussion bilaterally; No rales, rhonchi, wheezing, or rubs  HEART: Regular rate and rhythm; No murmurs, rubs, or gallops  ABDOMEN: Soft, Nontender, Nondistended; Bowel sounds present  EXTREMITIES:  2+ Peripheral Pulses, No clubbing, cyanosis, or edema  LYMPH: No lymphadenopathy noted  SKIN: No rashes or lesions    Care Discussed with Consultants/Other Providers [ x] YES  [ ] NO

## 2020-10-17 NOTE — PROGRESS NOTE ADULT - ASSESSMENT
Patient is a 65y old  Female from home, lives with daughter, ambulates with walker with PMH of recurrent SBO's, s/p exp lap, SB resection in 2015, ex lap, ALBINA in 2018, DVT, PE, on Xarelto, IVC filter, chronic leg swelling, and anasarca, Now send in to the ER by her PCP, Dr. Ross, for evaluation of  generalized weakness and hypotension BP of 80/40 during office visit. On admission, she found to have no fever and BP was in lower normal range and no Leukocytosis. The CXR is clear and CT abd/pelvis consistent with Ileus. The ID consult requested to assist with evaluation of infectious etiology of episode of hypotension.     # Hypotension  at office- BP of 80/40 - resolved   #  Bacteroides fragilis Bacteremia - 10/13/20 - ? source most likely GI- Repeat Blood Cxs have NGTD 10/16/20  # Ileus  - h/o recurrent SBO and s/p EXp. LAp  # COVID 19 negative       would recommend:    1. Continue Cefepime and  Flagyl to cover Bacteroides until 10/26/20  2. Monitor kidney function and adjust Abx doses accordingly   3. OOB to chair       Attending Attestation:    Spent more than 45 minutes on total encounter, more than 50 % of the visit was spent counseling and/or coordinating care by the Attending physician.

## 2020-10-17 NOTE — DISCHARGE NOTE PROVIDER - HOSPITAL COURSE
64y/o Female from home, lives with daughter, ambulates with walker with PMH of recurrent SBO's, s/p exp lap, SB resection in 2015, ALBINA in 2018,  DVT, PE, on Xarelto, IVC filter, chronic leg swelling, and anasarca, came in to the ED after being sent by PCP Dr. Ross for generalized weakness and hypotension during office visit. Patient was noted to be hypotensive with BP 80/40. Patient denies fever, SOB, palpitations, changes to her bowel or urinary habits, abdominal pain, urinary symptoms or any other acute complaints.    Her CT abd/pelvis findings consistent with Ileus and was found to be anemic on 10/14 w/ H&H 6.7/21.7  GI and ID were consulted.  Ileus was managed conservatively.       INCOMPLETE:::::: 65y Female from home, lives with daughter, ambulates with walker with PMH of recurrent SBO's, s/p exp lap, SB resection in 2015, ex lap, ALBINA in 2018, DVT, PE, on Xarelto, IVC filter, chronic leg swelling, and anasarca, came in to the ED after being sent by PCP Dr. Ross for generalized weakness and hypotension during office visit. Admitted for hypotension. S/P fluid resuscitation and albumin in ED. Blood cultures with bacteroides fragilis on 10/13. Repeat blood cultures negative 10/16. ID consulted, source likely GI, treated with antibiotics until 10/26. Hospital course complicated by worsening anemia, FOBT positive, s/p 1 unit PRBC on 10/9. Xarelto held.     INCOMPLETE 65y Female from home, lives with daughter, ambulates with walker with PMH of recurrent SBO's, s/p exp lap, SB resection in 2015, ex lap, ALBINA in 2018, DVT, PE, on Xarelto, IVC filter, chronic leg swelling, and anasarca, came in to the ED after being sent by PCP Dr. Ross for generalized weakness and hypotension during office visit. Admitted for hypotension. S/P fluid resuscitation and albumin in ED. Blood cultures with bacteroides fragilis on 10/13. Repeat blood cultures negative 10/16. ID consulted, source likely GI, treated with antibiotics, to complete course on 10/26. Hospital course complicated by worsening anemia, FOBT positive, s/p 1 unit PRBC on 10/9. Xarelto held. Restarted on 10/20. Pt is high risk for colonoscopy, GI recommending virtual colonoscopy as outpatient.     INCOMPLETE 10/20 I65y Female from home, lives with daughter, ambulates with walker with PMH of recurrent SBO's, s/p exp lap, SB resection in 2015, ex lap, ALBINA in 2018, DVT, PE, on Xarelto, IVC filter, chronic leg swelling, and anasarca, came in to the ED due to hypotension during office visit. Patient was found to be bacteremic with bacteroids fragilis. Admitted in ICU due to hypotension and hypothermia. patient completed course of antibiotics . BCx X2 negative. Ammonia level elevated and patient refused NGT placement, mental status deteriorated and patient desaturated to high 70%, patient got intubated on 10/24 . Sputum was positive for MRSA and Stenotrophomonas. patient started on vancomycin and Levaquin. patient was given lactulose for high ammonia level. kidney function and liver function started to deteriorate and patient was leaning toward multi organ failure . high dose of Lasix started and patient was aggressively diuresed. Kidney function improved . Ammonia level dropped to less than 10.  Mental status improved and patient extubated on 11/3/2020. Patient was seen by wound specialist and decubitus ulcer debrided. patient had a drop in h/h s/p debridement . Received 1 unit PRBC .                 11/12  Transferred from ICU to SCU  `11/17  Midodrine d/c secondary to daptomycin being started.  11/18 Febrile, Tmax 101.5. Repeat BC sent.   11/19 Discontinue velazquez.   11/20 febrile overnight.   11/22 s/p 2 units PRBC for Hgb 6.4  11/24 MRI pelvis (+) OM  11/25 PAVAN no endocarditis. Awaiting PICC  11/27 Spiked fever of 101F; Repeated cultures, chest xray, lactate, Follow up results. IR Denies PICC Placement due to fever. Plan for IR Monday (NPO Sunday) if afebrile over the weekend. Given one dose of lasix IV today for pleural effusion on chest xray; Give one more dose of IV Lasix 40mg tomorrow at 2pm if BP stable. Continue with daily lasix.     12/14 PICC line removed  12/17- low grade fever, cxr negative, no leukocytosis, procalcitonin trending down.   12/18- Low grade temp today 99.8F, cooperative during exam.  Leukocytosis continue to downtrend. Off Daptomycin and currently on Linezolid day 5/14. D/C planning in progress. CM following.  Issues w/ Linezolid cost.    12/19-  Pt remained stable- T-max 99F.  Sx consulted for sacral wound eval for possible redebridement given intermittent low fever as per ID and nephro recommendation.  Per sx, no surgical intervention at present. Will get UA and urine culture to evaluate for possible source of infection.  Of note, d/c planning ongoing and prescription for Linezolid e-prescribed to [facility] on 12/18.       LABS:  CBC Full  -  ( 23 Dec 2020 07:04 )  WBC Count : 8.42 K/uL  RBC Count : 2.62 M/uL  Hemoglobin : 7.3 g/dL  Hematocrit : 25.0 %  Platelet Count - Automated : 226 K/uL  Mean Cell Volume : 95.4 fl  Mean Cell Hemoglobin : 27.9 pg  Mean Cell Hemoglobin Concentration : 29.2 gm/dL  Auto Neutrophil # : 6.02 K/uL  Auto Lymphocyte # : 1.43 K/uL  Auto Monocyte # : 0.61 K/uL  Auto Eosinophil # : 0.28 K/uL  Auto Basophil # : 0.04 K/uL  Auto Neutrophil % : 71.5 %  Auto Lymphocyte % : 17.0 %  Auto Monocyte % : 7.2 %  Auto Eosinophil % : 3.3 %  Auto Basophil % : 0.5 %    12-23    143  |  105  |  14  ----------------------------<  76  3.4<L>   |  33<H>  |  0.54    Ca    8.1<L>      23 Dec 2020 07:04  Phos  3.5     12-23  Mg     2.1     12-22    TPro  6.3  /  Alb  1.7<L>  /  TBili  1.1  /  DBili  x   /  AST  24  /  ALT  21  /  AlkPhos  242<H>  12-23

## 2020-10-17 NOTE — DISCHARGE NOTE PROVIDER - CARE PROVIDER_API CALL
Yessica Ross  [address], [place] [address]  Phone: [phone]  Fax: [phone]  Follow Up Time:

## 2020-10-17 NOTE — PROGRESS NOTE ADULT - SUBJECTIVE AND OBJECTIVE BOX
Patient is seen and examined at the bed side, is afebrile.  The blood culture grew  Bacteroides fragilis.  And the repeat blood cultures have no growth to date.         REVIEW OF SYSTEMS: All other review systems are negative        ALLERGIES: No Known Allergies        Vital Signs Last 24 Hrs  T(C): 36.4 (17 Oct 2020 16:33), Max: 36.8 (17 Oct 2020 00:10)  T(F): 97.5 (17 Oct 2020 16:33), Max: 98.3 (17 Oct 2020 00:10)  HR: 90 (17 Oct 2020 16:33) (90 - 101)  BP: 107/61 (17 Oct 2020 16:33) (97/65 - 109/73)  BP(mean): --  RR: 17 (17 Oct 2020 16:33) (16 - 17)  SpO2: 100% (17 Oct 2020 16:33) (97% - 100%)        PHYSICAL EXAM:  GENERAL: Not in distress  CHEST/LUNG: Not using accessory muscles   HEART: s1 and s2 present  ABDOMEN:  Nontender and  Nondistended  EXTREMITIES: No pedal  edema  CNS: Awake and Alert        LABS:                        8.9    5.81  )-----------( 163      ( 17 Oct 2020 06:33 )             26.2                                    9.1    6.08  )-----------( 199      ( 16 Oct 2020 06:44 )             27.7         10-17    147<H>  |  118<H>  |  13  ----------------------------<  94  3.3<L>   |  23  |  0.72    Ca    7.8<L>      17 Oct 2020 06:33  Mg     1.6     10-17    TPro  4.0<L>  /  Alb  1.8<L>  /  TBili  1.6<H>  /  DBili  x   /  AST  34  /  ALT  46  /  AlkPhos  90  10-17      10-16    144  |  115<H>  |  14  ----------------------------<  75  3.3<L>   |  25  |  0.64    Ca    7.6<L>      16 Oct 2020 06:44  Mg     1.6     10-16    TPro  3.8<L>  /  Alb  1.7<L>  /  TBili  1.4<H>  /  DBili  x   /  AST  28  /  ALT  45  /  AlkPhos  91  10-14      Procalcitonin, Serum (10.15.20 @ 11:48)   Procalcitonin, Serum: 0.16: Procalcitonin (PCT) Interpretation (ng/mL) - Diagnosis of systemic   bacterial infection/sepsis       MEDICATIONS  (STANDING):    cefepime   IVPB      cefepime   IVPB 1000 milliGRAM(s) IV Intermittent every 8 hours  dextrose 5% + sodium chloride 0.45% with potassium chloride 10 mEq/L 100 milliLiter(s) (100 mL/Hr) IV Continuous <Continuous>  lactulose Syrup 20 Gram(s) Oral every 4 hours  metroNIDAZOLE  IVPB 500 milliGRAM(s) IV Intermittent every 8 hours  metroNIDAZOLE  IVPB      pantoprazole  Injectable 40 milliGRAM(s) IV Push two times a day  senna 2 Tablet(s) Oral at bedtime        RADIOLOGY & ADDITIONAL TESTS:    Culture - Blood in AM (10.16.20 @ 10:13)   Specimen Source: .Blood Blood-Peripheral   Culture Results:   No growth to date.     Culture - Blood in AM (10.16.20 @ 10:13)   Specimen Source: .Blood Blood-Peripheral   Culture Results:   No growth to date.     Culture - Blood (10.13.20 @ 22:23)   - Multidrug (KPC pos) resistant organism: Nondet   - Staphylococcus aureus: Nondet   - Methicillin resistant Staphylococcus aureus (MRSA): Nondet   - Coagulase negative Staphylococcus: Nondet   - Enterococcus species: Nondet   - Vancomycin resistant Enterococcus sp.: Nondet   - Escherichia coli: Nondet   - Klebsiella oxytoca: Nondet   - Klebsiella pneumoniae: Nondet   - Serratia marcescens: Nondet   - Haemophilus influenzae: Nondet   - Listeria monocytogenes: Nondet   - Neisseria meningitidis: Nondet   - Pseudomonas aeruginosa: Nondet   - Acinetobacter baumanii: Nondet   - Enterobacter cloacae complex: Nondet   - Streptococcus sp. (Not Grp A, B or S pneumoniae): Nondet   - Streptococcus agalactiae (Group B): Nondet   - Streptococcus pyogenes (Group A): Nondet   - Streptococcus pneumoniae: Nondet   - Candida albicans: Nondet   - Candida glabrata: Nondet   - Candida krusei: Nondet   - Candida parapsilosis: Nondet   - Candida tropicalis: Nondet   Gram Stain:   Growth in anaerobic bottle: Gram Negative Rods   Specimen Source: .Blood Blood-Peripheral   Organism: Blood Culture PCR   Culture Results:   Growth in anaerobic bottle: Bacteroides fragilis   "Susceptibilities not performed"   ***Blood Panel PCR results on this specimen are available   approximately 3 hours after the Gram stain result.***   Gram stain, PCR, and/or culture results may not always   correspond due to difference in methodologies.       Culture - Blood (10.13.20 @ 22:23)   Specimen Source: .Blood Blood-Peripheral   Culture Results:   No growth to date.     Urine Microscopic-Add On (NC) (10.13.20 @ 21:39)   Bacteria: Moderate /HPF   Epithelial Cells: Moderate /HPF   Red Blood Cell - Urine: 5-10 /HPF   White Blood Cell - Urine: 6-10 /HPF     10/13/20 : CT Abdomen and Pelvis w/ IV Cont (10.13.20 @ 18:50) >    Diffuse dilatation of small bowel loops without a clear transition is slightly increased, likely an ileus.  Decreased moderate ascites.    1.5 cm pancreatic versus peripancreatic soft tissue nodule    10/13/20 : Xray Chest 1 View- PORTABLE-Urgent (Xray Chest 1 View- PORTABLE-Urgent .) (10.13.20 @ 16:34) Clear lungs.

## 2020-10-18 NOTE — PROGRESS NOTE ADULT - NUTRITIONAL ASSESSMENT
This patient has been assessed with a concern for Malnutrition and has been determined to have a diagnosis/diagnoses of Severe protein-calorie malnutrition.    This patient is being managed with:   Diet Soft-  Consistent Carbohydrate {Evening Snacks}  Supplement Feeding Modality:  Oral  Glucerna Shake Cans or Servings Per Day:  1       Frequency:  Two Times a day  Entered: Oct 14 2020  1:14PM    DC PLANING IN AM IF STABLE   ANTICOAGULATION AS PER GI

## 2020-10-18 NOTE — PROGRESS NOTE ADULT - ASSESSMENT
Patient is a 65y old  Female from home, lives with daughter, ambulates with walker with PMH of recurrent SBO's, s/p exp lap, SB resection in 2015, ex lap, ALBINA in 2018, DVT, PE, on Xarelto, IVC filter, chronic leg swelling, and anasarca, Now send in to the ER by her PCP, Dr. Ross, for evaluation of  generalized weakness and hypotension BP of 80/40 during office visit. On admission, she found to have no fever and BP was in lower normal range and no Leukocytosis. The CXR is clear and CT abd/pelvis consistent with Ileus. The ID consult requested to assist with evaluation of infectious etiology of episode of hypotension.     # Hypotension  at office- BP of 80/40 - resolved   #  Bacteroides fragilis Bacteremia - 10/13/20 - ? source most likely GI- Repeat Blood Cxs have NGTD 10/16/20  # Ileus  - h/o recurrent SBO and s/p EXp. LAp  # COVID 19 negative       would recommend:    1. OOB to chair   2. Continue Cefepime and  Flagyl to cover Bacteroides until 10/26/20  3. Monitor kidney function and adjust Abx doses accordingly       Attending Attestation:    Spent more than 40 minutes on total encounter, more than 50 % of the visit was spent counseling and/or coordinating care by the Attending physician.

## 2020-10-18 NOTE — PROGRESS NOTE ADULT - SUBJECTIVE AND OBJECTIVE BOX
Patient is seen and examined at the bed side, is afebrile.  She is doing better and tolerating Abx well.        REVIEW OF SYSTEMS: All other review systems are negative        ALLERGIES: No Known Allergies        Vital Signs Last 24 Hrs  T(C): 36.3 (18 Oct 2020 15:45), Max: 36.4 (18 Oct 2020 00:21)  T(F): 97.3 (18 Oct 2020 15:45), Max: 97.6 (18 Oct 2020 00:21)  HR: 90 (18 Oct 2020 15:45) (90 - 96)  BP: 97/71 (18 Oct 2020 15:45) (90/60 - 104/62)  BP(mean): --  RR: 16 (18 Oct 2020 15:45) (16 - 16)  SpO2: 100% (18 Oct 2020 15:45) (95% - 100%)        PHYSICAL EXAM:  GENERAL: Not in distress  CHEST/LUNG: Not using accessory muscles   HEART: s1 and s2 present  ABDOMEN:  Nontender and  Nondistended  EXTREMITIES: No pedal  edema  CNS: Awake and Alert        LABS:                        8.9    3.95  )-----------( 150      ( 18 Oct 2020 06:48 )             25.9                           8.9    5.81  )-----------( 163      ( 17 Oct 2020 06:33 )             26.2                10-18    146<H>  |  118<H>  |  11  ----------------------------<  131<H>  3.2<L>   |  21<L>  |  0.77    Ca    7.5<L>      18 Oct 2020 06:48  Mg     1.6     10-18    TPro  4.0<L>  /  Alb  1.8<L>  /  TBili  1.6<H>  /  DBili  x   /  AST  34  /  ALT  46  /  AlkPhos  90  10-17    10-17    147<H>  |  118<H>  |  13  ----------------------------<  94  3.3<L>   |  23  |  0.72    Ca    7.8<L>      17 Oct 2020 06:33  Mg     1.6     10-17    TPro  4.0<L>  /  Alb  1.8<L>  /  TBili  1.6<H>  /  DBili  x   /  AST  34  /  ALT  46  /  AlkPhos  90  10-17        Procalcitonin, Serum (10.15.20 @ 11:48)   Procalcitonin, Serum: 0.16: Procalcitonin (PCT) Interpretation (ng/mL) - Diagnosis of systemic   bacterial infection/sepsis       MEDICATIONS  (STANDING):      cefepime   IVPB      cefepime   IVPB 1000 milliGRAM(s) IV Intermittent every 8 hours  lactulose Syrup 20 Gram(s) Oral every 4 hours  metroNIDAZOLE  IVPB 500 milliGRAM(s) IV Intermittent every 8 hours  metroNIDAZOLE  IVPB      pantoprazole  Injectable 40 milliGRAM(s) IV Push two times a day  senna 2 Tablet(s) Oral at bedtime        RADIOLOGY & ADDITIONAL TESTS:    Culture - Blood in AM (10.16.20 @ 10:13)   Specimen Source: .Blood Blood-Peripheral   Culture Results:   No growth to date.     Culture - Blood in AM (10.16.20 @ 10:13)   Specimen Source: .Blood Blood-Peripheral   Culture Results:   No growth to date.     Culture - Blood (10.13.20 @ 22:23)   - Multidrug (KPC pos) resistant organism: Nondet   - Staphylococcus aureus: Nondet   - Methicillin resistant Staphylococcus aureus (MRSA): Nondet   - Coagulase negative Staphylococcus: Nondet   - Enterococcus species: Nondet   - Vancomycin resistant Enterococcus sp.: Nondet   - Escherichia coli: Nondet   - Klebsiella oxytoca: Nondet   - Klebsiella pneumoniae: Nondet   - Serratia marcescens: Nondet   - Haemophilus influenzae: Nondet   - Listeria monocytogenes: Nondet   - Neisseria meningitidis: Nondet   - Pseudomonas aeruginosa: Nondet   - Acinetobacter baumanii: Nondet   - Enterobacter cloacae complex: Nondet   - Streptococcus sp. (Not Grp A, B or S pneumoniae): Nondet   - Streptococcus agalactiae (Group B): Nondet   - Streptococcus pyogenes (Group A): Nondet   - Streptococcus pneumoniae: Nondet   - Candida albicans: Nondet   - Candida glabrata: Nondet   - Candida krusei: Nondet   - Candida parapsilosis: Nondet   - Candida tropicalis: Nondet   Gram Stain:   Growth in anaerobic bottle: Gram Negative Rods   Specimen Source: .Blood Blood-Peripheral   Organism: Blood Culture PCR   Culture Results:   Growth in anaerobic bottle: Bacteroides fragilis   "Susceptibilities not performed"   ***Blood Panel PCR results on this specimen are available   approximately 3 hours after the Gram stain result.***   Gram stain, PCR, and/or culture results may not always   correspond due to difference in methodologies.       Culture - Blood (10.13.20 @ 22:23)   Specimen Source: .Blood Blood-Peripheral   Culture Results:   No growth to date.     Urine Microscopic-Add On (NC) (10.13.20 @ 21:39)   Bacteria: Moderate /HPF   Epithelial Cells: Moderate /HPF   Red Blood Cell - Urine: 5-10 /HPF   White Blood Cell - Urine: 6-10 /HPF     10/13/20 : CT Abdomen and Pelvis w/ IV Cont (10.13.20 @ 18:50) >    Diffuse dilatation of small bowel loops without a clear transition is slightly increased, likely an ileus.  Decreased moderate ascites.    1.5 cm pancreatic versus peripancreatic soft tissue nodule    10/13/20 : Xray Chest 1 View- PORTABLE-Urgent (Xray Chest 1 View- PORTABLE-Urgent .) (10.13.20 @ 16:34) Clear lungs.

## 2020-10-18 NOTE — PROGRESS NOTE ADULT - SUBJECTIVE AND OBJECTIVE BOX
Patient was seen and examined  Patient is a 65y old  Female who presents with a chief complaint of Hypotension (17 Oct 2020 20:27)      INTERVAL HPI/OVERNIGHT EVENTS:  T(C): 36.3 (10-18-20 @ 15:45), Max: 36.4 (10-18-20 @ 00:21)  HR: 90 (10-18-20 @ 15:45) (90 - 96)  BP: 97/71 (10-18-20 @ 15:45) (90/60 - 104/62)  RR: 16 (10-18-20 @ 15:45) (16 - 16)  SpO2: 100% (10-18-20 @ 15:45) (95% - 100%)  Wt(kg): --  I&O's Summary      LABS:                        8.9    3.95  )-----------( 150      ( 18 Oct 2020 06:48 )             25.9     10-18    146<H>  |  118<H>  |  11  ----------------------------<  131<H>  3.2<L>   |  21<L>  |  0.77    Ca    7.5<L>      18 Oct 2020 06:48  Mg     1.6     10-18    TPro  4.0<L>  /  Alb  1.8<L>  /  TBili  1.6<H>  /  DBili  x   /  AST  34  /  ALT  46  /  AlkPhos  90  10-17        CAPILLARY BLOOD GLUCOSE                  MEDICATIONS  (STANDING):  cefepime   IVPB      cefepime   IVPB 1000 milliGRAM(s) IV Intermittent every 8 hours  lactulose Syrup 20 Gram(s) Oral every 4 hours  metroNIDAZOLE  IVPB 500 milliGRAM(s) IV Intermittent every 8 hours  metroNIDAZOLE  IVPB      pantoprazole  Injectable 40 milliGRAM(s) IV Push two times a day  senna 2 Tablet(s) Oral at bedtime    MEDICATIONS  (PRN):      RADIOLOGY & ADDITIONAL TESTS:    Imaging Personally Reviewed:  [ ] YES  [ ] NO    REVIEW OF SYSTEMS:  CONSTITUTIONAL: No fever, weight loss, or fatigue  EYES: No eye pain, visual disturbances, or discharge  ENMT:  No difficulty hearing, tinnitus, vertigo; No sinus or throat pain  NECK: No pain or stiffness  BREASTS: No pain, masses, or nipple discharge  RESPIRATORY: No cough, wheezing, chills or hemoptysis; No shortness of breath  CARDIOVASCULAR: No chest pain, palpitations, dizziness, or leg swelling  GASTROINTESTINAL: No abdominal or epigastric pain. No nausea, vomiting, or hematemesis; No diarrhea or constipation. No melena or hematochezia.  GENITOURINARY: No dysuria, frequency, hematuria, or incontinence  NEUROLOGICAL: No headaches, memory loss, loss of strength, numbness, or tremors  SKIN: No itching, burning, rashes, or lesions   LYMPH NODES: No enlarged glands  ENDOCRINE: No heat or cold intolerance; No hair loss  MUSCULOSKELETAL: No joint pain or swelling; No muscle, back, or extremity pain  PSYCHIATRIC: No depression, anxiety, mood swings, or difficulty sleeping  HEME/LYMPH: No easy bruising, or bleeding gums  ALLERY AND IMMUNOLOGIC: No hives or eczema      Consultant(s) Notes Reviewed:  [ ] YES  [ ] NO    PHYSICAL EXAM:  GENERAL: NAD, well-groomed, well-developed  HEAD:  Atraumatic, Normocephalic  EYES: EOMI, PERRLA, conjunctiva and sclera clear  ENMT: No tonsillar erythema, exudates, or enlargement; Moist mucous membranes, Good dentition, No lesions  NECK: Supple, No JVD, Normal thyroid  NERVOUS SYSTEM:  Alert & Oriented X3, Good concentration; Motor Strength 5/5 B/L upper and lower extremities; DTRs 2+ intact and symmetric  CHEST/LUNG: Clear to percussion bilaterally; No rales, rhonchi, wheezing, or rubs  HEART: Regular rate and rhythm; No murmurs, rubs, or gallops  ABDOMEN: Soft, Nontender, Nondistended; Bowel sounds present  EXTREMITIES:  2+ Peripheral Pulses, No clubbing, cyanosis, or edema  LYMPH: No lymphadenopathy noted  SKIN: No rashes or lesions    Care Discussed with Consultants/Other Providers [ x] YES  [ ] NO

## 2020-10-19 NOTE — PROGRESS NOTE ADULT - PROBLEM SELECTOR PLAN 3
S/P 1 unit PRBC on 10/9  Hemoglobin stable  Per heme/onc, most likely malnutrition from short bowel syndrome   Occult positive, but no signs of melena or hematochezia on rectal exam by GI  No clinically significant GI bleeding at this time   Transfuse for Hb < 7  GI Dr. Isidro Schumacher/onc Dr. Rivera

## 2020-10-19 NOTE — PROGRESS NOTE ADULT - SUBJECTIVE AND OBJECTIVE BOX
pt seen and examined at bedside full note to follow pt seen this afternoon, is more alert then last visit, stated she was able to have a few BM in the past day, denies any rectal bleed, melena, passing flatus.     Vital Signs Last 24 Hrs  T(C): 36.1 (19 Oct 2020 15:34), Max: 36.6 (19 Oct 2020 07:53)  T(F): 97 (19 Oct 2020 15:34), Max: 97.8 (19 Oct 2020 07:53)  HR: 85 (19 Oct 2020 15:34) (85 - 92)  BP: 91/64 (19 Oct 2020 15:34) (85/60 - 101/69)    Cardio: S1S2 present  Resp: lung clear and equal b/l  Abd: soft, non tender, mildly distended                           8.5    6.15  )-----------( 119      ( 19 Oct 2020 06:52 )             25.0   10-19    144  |  116<H>  |  10  ----------------------------<  156<H>  3.3<L>   |  21<L>  |  0.68    Ca    7.1<L>      19 Oct 2020 06:52  Mg     1.5     10-19    MEDICATIONS  (STANDING):  cefepime   IVPB      cefepime   IVPB 1000 milliGRAM(s) IV Intermittent every 8 hours  lactulose Syrup 20 Gram(s) Oral every 4 hours  metroNIDAZOLE  IVPB 500 milliGRAM(s) IV Intermittent every 8 hours  metroNIDAZOLE  IVPB      pantoprazole  Injectable 40 milliGRAM(s) IV Push two times a day  senna 2 Tablet(s) Oral at bedtime

## 2020-10-19 NOTE — CONSULT NOTE ADULT - ASSESSMENT
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  BC grew bacteroides.

## 2020-10-19 NOTE — PROGRESS NOTE ADULT - PROBLEM SELECTOR PLAN 1
Afebrile, no leukocytosis   Source likely GI, pt is high risk for colonoscopy   Blood cultures 10/13 with bacteroids fragilis   Repeat blood cultures 10/16 negative   Continue cefepime and flagyl until 10/26  ID Dr. Patricio

## 2020-10-19 NOTE — PROGRESS NOTE ADULT - SUBJECTIVE AND OBJECTIVE BOX
Patient is seen and examined at the bed side, is afebrile.  She is doing better and tolerating Abx well.        REVIEW OF SYSTEMS: All other review systems are negative        ALLERGIES: No Known Allergies      Vital Signs Last 24 Hrs  T(C): 36.1 (19 Oct 2020 15:34), Max: 36.6 (19 Oct 2020 07:53)  T(F): 97 (19 Oct 2020 15:34), Max: 97.8 (19 Oct 2020 07:53)  HR: 85 (19 Oct 2020 15:34) (85 - 92)  BP: 91/64 (19 Oct 2020 15:34) (85/60 - 101/69)  BP(mean): 70 (19 Oct 2020 15:34) (65 - 70)  RR: 16 (19 Oct 2020 15:34) (14 - 16)  SpO2: 100% (19 Oct 2020 15:34) (98% - 100%)      PHYSICAL EXAM:  GENERAL: Not in distress  CHEST/LUNG: Not using accessory muscles   HEART: s1 and s2 present  ABDOMEN:  Nontender and  Nondistended  EXTREMITIES: No pedal  edema  CNS: Awake and Alert        LABS:                        8.5    6.15  )-----------( 119      ( 19 Oct 2020 06:52 )             25.0                           8.9    3.95  )-----------( 150      ( 18 Oct 2020 06:48 )             25.9       10-19    144  |  116<H>  |  10  ----------------------------<  156<H>  3.3<L>   |  21<L>  |  0.68    Ca    7.1<L>      19 Oct 2020 06:52  Mg     1.5     10-19      10-18    146<H>  |  118<H>  |  11  ----------------------------<  131<H>  3.2<L>   |  21<L>  |  0.77    Ca    7.5<L>      18 Oct 2020 06:48  Mg     1.6     10-18    TPro  4.0<L>  /  Alb  1.8<L>  /  TBili  1.6<H>  /  DBili  x   /  AST  34  /  ALT  46  /  AlkPhos  90  10-17      Procalcitonin, Serum (10.15.20 @ 11:48)   Procalcitonin, Serum: 0.16: Procalcitonin (PCT) Interpretation (ng/mL) - Diagnosis of systemic   bacterial infection/sepsis       MEDICATIONS  (STANDING):      cefepime   IVPB      cefepime   IVPB 1000 milliGRAM(s) IV Intermittent every 8 hours  lactulose Syrup 20 Gram(s) Oral every 4 hours  metroNIDAZOLE  IVPB 500 milliGRAM(s) IV Intermittent every 8 hours  metroNIDAZOLE  IVPB      pantoprazole  Injectable 40 milliGRAM(s) IV Push two times a day  senna 2 Tablet(s) Oral at bedtime        RADIOLOGY & ADDITIONAL TESTS:    Culture - Blood in AM (10.16.20 @ 10:13)   Specimen Source: .Blood Blood-Peripheral   Culture Results:   No growth to date.     Culture - Blood in AM (10.16.20 @ 10:13)   Specimen Source: .Blood Blood-Peripheral   Culture Results:   No growth to date.     Culture - Blood (10.13.20 @ 22:23)   - Multidrug (KPC pos) resistant organism: Nondet   - Staphylococcus aureus: Nondet   - Methicillin resistant Staphylococcus aureus (MRSA): Nondet   - Coagulase negative Staphylococcus: Nondet   - Enterococcus species: Nondet   - Vancomycin resistant Enterococcus sp.: Nondet   - Escherichia coli: Nondet   - Klebsiella oxytoca: Nondet   - Klebsiella pneumoniae: Nondet   - Serratia marcescens: Nondet   - Haemophilus influenzae: Nondet   - Listeria monocytogenes: Nondet   - Neisseria meningitidis: Nondet   - Pseudomonas aeruginosa: Nondet   - Acinetobacter baumanii: Nondet   - Enterobacter cloacae complex: Nondet   - Streptococcus sp. (Not Grp A, B or S pneumoniae): Nondet   - Streptococcus agalactiae (Group B): Nondet   - Streptococcus pyogenes (Group A): Nondet   - Streptococcus pneumoniae: Nondet   - Candida albicans: Nondet   - Candida glabrata: Nondet   - Candida krusei: Nondet   - Candida parapsilosis: Nondet   - Candida tropicalis: Nondet   Gram Stain:   Growth in anaerobic bottle: Gram Negative Rods   Specimen Source: .Blood Blood-Peripheral   Organism: Blood Culture PCR   Culture Results:   Growth in anaerobic bottle: Bacteroides fragilis   "Susceptibilities not performed"   ***Blood Panel PCR results on this specimen are available   approximately 3 hours after the Gram stain result.***   Gram stain, PCR, and/or culture results may not always   correspond due to difference in methodologies.       Culture - Blood (10.13.20 @ 22:23)   Specimen Source: .Blood Blood-Peripheral   Culture Results:   No growth to date.     Urine Microscopic-Add On (NC) (10.13.20 @ 21:39)   Bacteria: Moderate /HPF   Epithelial Cells: Moderate /HPF   Red Blood Cell - Urine: 5-10 /HPF   White Blood Cell - Urine: 6-10 /HPF     10/13/20 : CT Abdomen and Pelvis w/ IV Cont (10.13.20 @ 18:50) >    Diffuse dilatation of small bowel loops without a clear transition is slightly increased, likely an ileus.  Decreased moderate ascites.    1.5 cm pancreatic versus peripancreatic soft tissue nodule    10/13/20 : Xray Chest 1 View- PORTABLE-Urgent (Xray Chest 1 View- PORTABLE-Urgent .) (10.13.20 @ 16:34) Clear lungs.         Patient is seen and examined at the bed side, is afebrile.  The WBC count and creatinine stay normal.         REVIEW OF SYSTEMS: All other review systems are negative        ALLERGIES: No Known Allergies      Vital Signs Last 24 Hrs  T(C): 36.1 (19 Oct 2020 15:34), Max: 36.6 (19 Oct 2020 07:53)  T(F): 97 (19 Oct 2020 15:34), Max: 97.8 (19 Oct 2020 07:53)  HR: 85 (19 Oct 2020 15:34) (85 - 92)  BP: 91/64 (19 Oct 2020 15:34) (85/60 - 101/69)  BP(mean): 70 (19 Oct 2020 15:34) (65 - 70)  RR: 16 (19 Oct 2020 15:34) (14 - 16)  SpO2: 100% (19 Oct 2020 15:34) (98% - 100%)      PHYSICAL EXAM:  GENERAL: Not in distress  CHEST/LUNG: Not using accessory muscles   HEART: s1 and s2 present  ABDOMEN:  Nontender and  Nondistended  EXTREMITIES: No pedal  edema  CNS: Awake and Alert        LABS:                        8.5    6.15  )-----------( 119      ( 19 Oct 2020 06:52 )             25.0                           8.9    3.95  )-----------( 150      ( 18 Oct 2020 06:48 )             25.9       10-19    144  |  116<H>  |  10  ----------------------------<  156<H>  3.3<L>   |  21<L>  |  0.68    Ca    7.1<L>      19 Oct 2020 06:52  Mg     1.5     10-19      10-18    146<H>  |  118<H>  |  11  ----------------------------<  131<H>  3.2<L>   |  21<L>  |  0.77    Ca    7.5<L>      18 Oct 2020 06:48  Mg     1.6     10-18    TPro  4.0<L>  /  Alb  1.8<L>  /  TBili  1.6<H>  /  DBili  x   /  AST  34  /  ALT  46  /  AlkPhos  90  10-17      Procalcitonin, Serum (10.15.20 @ 11:48)   Procalcitonin, Serum: 0.16: Procalcitonin (PCT) Interpretation (ng/mL) - Diagnosis of systemic   bacterial infection/sepsis       MEDICATIONS  (STANDING):      cefepime   IVPB      cefepime   IVPB 1000 milliGRAM(s) IV Intermittent every 8 hours  lactulose Syrup 20 Gram(s) Oral every 4 hours  metroNIDAZOLE  IVPB 500 milliGRAM(s) IV Intermittent every 8 hours  metroNIDAZOLE  IVPB      pantoprazole  Injectable 40 milliGRAM(s) IV Push two times a day  senna 2 Tablet(s) Oral at bedtime        RADIOLOGY & ADDITIONAL TESTS:    Culture - Blood in AM (10.16.20 @ 10:13)   Specimen Source: .Blood Blood-Peripheral   Culture Results:   No growth to date.     Culture - Blood in AM (10.16.20 @ 10:13)   Specimen Source: .Blood Blood-Peripheral   Culture Results:   No growth to date.     Culture - Blood (10.13.20 @ 22:23)   - Multidrug (KPC pos) resistant organism: Nondet   - Staphylococcus aureus: Nondet   - Methicillin resistant Staphylococcus aureus (MRSA): Nondet   - Coagulase negative Staphylococcus: Nondet   - Enterococcus species: Nondet   - Vancomycin resistant Enterococcus sp.: Nondet   - Escherichia coli: Nondet   - Klebsiella oxytoca: Nondet   - Klebsiella pneumoniae: Nondet   - Serratia marcescens: Nondet   - Haemophilus influenzae: Nondet   - Listeria monocytogenes: Nondet   - Neisseria meningitidis: Nondet   - Pseudomonas aeruginosa: Nondet   - Acinetobacter baumanii: Nondet   - Enterobacter cloacae complex: Nondet   - Streptococcus sp. (Not Grp A, B or S pneumoniae): Nondet   - Streptococcus agalactiae (Group B): Nondet   - Streptococcus pyogenes (Group A): Nondet   - Streptococcus pneumoniae: Nondet   - Candida albicans: Nondet   - Candida glabrata: Nondet   - Candida krusei: Nondet   - Candida parapsilosis: Nondet   - Candida tropicalis: Nondet   Gram Stain:   Growth in anaerobic bottle: Gram Negative Rods   Specimen Source: .Blood Blood-Peripheral   Organism: Blood Culture PCR   Culture Results:   Growth in anaerobic bottle: Bacteroides fragilis   "Susceptibilities not performed"   ***Blood Panel PCR results on this specimen are available   approximately 3 hours after the Gram stain result.***   Gram stain, PCR, and/or culture results may not always   correspond due to difference in methodologies.       Culture - Blood (10.13.20 @ 22:23)   Specimen Source: .Blood Blood-Peripheral   Culture Results:   No growth to date.     Urine Microscopic-Add On (NC) (10.13.20 @ 21:39)   Bacteria: Moderate /HPF   Epithelial Cells: Moderate /HPF   Red Blood Cell - Urine: 5-10 /HPF   White Blood Cell - Urine: 6-10 /HPF     10/13/20 : CT Abdomen and Pelvis w/ IV Cont (10.13.20 @ 18:50) >    Diffuse dilatation of small bowel loops without a clear transition is slightly increased, likely an ileus.  Decreased moderate ascites.    1.5 cm pancreatic versus peripancreatic soft tissue nodule    10/13/20 : Xray Chest 1 View- PORTABLE-Urgent (Xray Chest 1 View- PORTABLE-Urgent .) (10.13.20 @ 16:34) Clear lungs.

## 2020-10-19 NOTE — PROGRESS NOTE ADULT - PROBLEM SELECTOR PLAN 5
PE on CT angio from January 2020  Bilateral DVTs on dopplers January 2019    Has been on Xarelto for 2 years   Held for anemia, if pt will have colonoscopy, restart after

## 2020-10-19 NOTE — PROGRESS NOTE ADULT - ASSESSMENT
Patient is a 65y old  Female from home, lives with daughter, ambulates with walker with PMH of recurrent SBO's, s/p exp lap, SB resection in 2015, ex lap, ALBINA in 2018, DVT, PE, on Xarelto, IVC filter, chronic leg swelling, and anasarca, Now send in to the ER by her PCP, Dr. Ross, for evaluation of  generalized weakness and hypotension BP of 80/40 during office visit. On admission, she found to have no fever and BP was in lower normal range and no Leukocytosis. The CXR is clear and CT abd/pelvis consistent with Ileus. The ID consult requested to assist with evaluation of infectious etiology of episode of hypotension.     # Hypotension  at office- BP of 80/40 - resolved   #  Bacteroides fragilis Bacteremia - 10/13/20 - ? source most likely GI- Repeat Blood Cxs have NGTD 10/16/20  # Ileus  - h/o recurrent SBO and s/p EXp. LAp  # COVID 19 negative       would recommend:    1. OOB to chair   2. Continue Cefepime and  Flagyl to cover Bacteroides until 10/26/20  3. Monitor kidney function and adjust Abx doses accordingly       Attending Attestation:    Spent more than 40 minutes on total encounter, more than 50 % of the visit was spent counseling and/or coordinating care by the Attending physician.   Patient is a 65y old  Female from home, lives with daughter, ambulates with walker with PMH of recurrent SBO's, s/p exp lap, SB resection in 2015, ex lap, ALBINA in 2018, DVT, PE, on Xarelto, IVC filter, chronic leg swelling, and anasarca, Now send in to the ER by her PCP, Dr. Ross, for evaluation of  generalized weakness and hypotension BP of 80/40 during office visit. On admission, she found to have no fever and BP was in lower normal range and no Leukocytosis. The CXR is clear and CT abd/pelvis consistent with Ileus. The ID consult requested to assist with evaluation of infectious etiology of episode of hypotension.     # Hypotension  at office- BP of 80/40 - resolved   #  Bacteroides fragilis Bacteremia - 10/13/20 - ? source most likely GI- Repeat Blood Cxs have NGTD 10/16/20  # Ileus  - h/o recurrent SBO and s/p EXp. LAp  # COVID 19 negative       would recommend:    1. OOB to chair   2. Continue Cefepime and  Flagyl to cover Bacteroides until 10/26/20  X 7 more days   3. Monitor kidney function and adjust Abx doses accordingly       Attending Attestation:    Spent more than 40 minutes on total encounter, more than 50 % of the visit was spent counseling and/or coordinating care by the Attending physician.

## 2020-10-19 NOTE — PROGRESS NOTE ADULT - ASSESSMENT
64 YO F with CT abd showing probable ileus, continues to have BM and pass flatus, abd continues to be tympanic and mildly distended, anemia has been stable, HgB of 8.5     #Ileus   - continue reglan   - can give docusate 10mg qhs     #Anemia   - Agree with hematology, most likely due to malnutrition from short bowel syndrome    - no signs of melena or hematochezia on rectal exam; brown stool on rectal exam previously   - pt does not appear to have clinically significant GI bleeding at this time  - pt is high risk for any invasive procedure at this time and would not benefit from a colonoscopy as no overt signs of bleeding noted   - continue to monitor CBC and transfuse if HgB <7  - rest of care as per primary team

## 2020-10-19 NOTE — PROGRESS NOTE ADULT - SUBJECTIVE AND OBJECTIVE BOX
NP Note discussed with  Primary Attending    Patient is a 65y old  Female who presents with a chief complaint of Hypotension (19 Oct 2020 09:33)    65y Female from home, lives with daughter, ambulates with walker with PMH of recurrent SBO's, s/p exp lap, SB resection in 2015, ex lap, ALBINA in 2018, DVT, PE, on Xarelto, IVC filter, chronic leg swelling, and anasarca, came in to the ED after being sent by PCP Dr. Ross for generalized weakness and hypotension during office visit. Admitted for hypotension. S/P fluid resuscitation and albumin in ED. Blood cultures with bacteroides fragilis. ID consulted, source likely GI, treated with antibiotics until 10/26. Hospital course complicated by worsening anemia, FOBT positive, s/p 1 unit PRBC on 10/9. Xarelto held.     INTERVAL HPI/OVERNIGHT EVENTS: no new complaints    MEDICATIONS  (STANDING):  cefepime   IVPB      cefepime   IVPB 1000 milliGRAM(s) IV Intermittent every 8 hours  lactulose Syrup 20 Gram(s) Oral every 4 hours  metroNIDAZOLE  IVPB 500 milliGRAM(s) IV Intermittent every 8 hours  metroNIDAZOLE  IVPB      pantoprazole  Injectable 40 milliGRAM(s) IV Push two times a day  senna 2 Tablet(s) Oral at bedtime    MEDICATIONS  (PRN):      __________________________________________________  REVIEW OF SYSTEMS:    CONSTITUTIONAL: No fever,   EYES: no acute visual disturbances  NECK: No pain or stiffness  RESPIRATORY: No cough; No shortness of breath  CARDIOVASCULAR: No chest pain, no palpitations  GASTROINTESTINAL: No pain. No nausea or vomiting; No diarrhea   NEUROLOGICAL: No headache or numbness, no tremors  MUSCULOSKELETAL: No joint pain, no muscle pain  GENITOURINARY: no dysuria, no frequency, no hesitancy  PSYCHIATRY: no depression , no anxiety  ALL OTHER  ROS negative        Vital Signs Last 24 Hrs  T(C): 36.6 (19 Oct 2020 07:53), Max: 36.6 (19 Oct 2020 07:53)  T(F): 97.8 (19 Oct 2020 07:53), Max: 97.8 (19 Oct 2020 07:53)  HR: 89 (19 Oct 2020 07:53) (89 - 92)  BP: 85/60 (19 Oct 2020 07:53) (85/60 - 101/69)  BP(mean): 65 (19 Oct 2020 07:53) (65 - 65)  RR: 14 (19 Oct 2020 07:53) (14 - 16)  SpO2: 98% (19 Oct 2020 07:53) (98% - 100%)    ________________________________________________  PHYSICAL EXAM:  GENERAL: NAD  HEENT: Normocephalic;  conjunctivae and sclerae clear; moist mucous membranes;   NECK : supple  CHEST/LUNG: Clear to auscultation bilaterally with good air entry   HEART: S1 S2  regular; no murmurs, gallops or rubs  ABDOMEN: Soft, Nontender, Nondistended; Bowel sounds present  EXTREMITIES: no cyanosis; no edema; no calf tenderness  SKIN: warm and dry; no rash  NERVOUS SYSTEM:  Awake and alert; Oriented  to place, person and time ; no new deficits    _________________________________________________  LABS:                        8.5    6.15  )-----------( 119      ( 19 Oct 2020 06:52 )             25.0     10-19    144  |  116<H>  |  10  ----------------------------<  156<H>  3.3<L>   |  21<L>  |  0.68    Ca    7.1<L>      19 Oct 2020 06:52  Mg     1.5     10-19      CAPILLARY BLOOD GLUCOSE    RADIOLOGY & ADDITIONAL TESTS:    Imaging  Reviewed:  YES    Consultant(s) Notes Reviewed:   YES    Plan of care was discussed with patient and /or primary care giver; all questions and concerns were addressed

## 2020-10-19 NOTE — PROGRESS NOTE ADULT - PROBLEM SELECTOR PLAN 2
Improved, now AOx3   Multifactorial, hepatic vs infectious   Continue lactulose, hold for loose bowel movements   Daily ammonia  CT head negative   Neuro Dr. Cabrera

## 2020-10-19 NOTE — CONSULT NOTE ADULT - SUBJECTIVE AND OBJECTIVE BOX
Patient is a 65y old  Female who presents with a chief complaint of Hypotension (18 Oct 2020 20:45)      HPI:  65y Female from home, lives with daughter, ambulates with walker with PMH of recurrent SBO's, s/p exp lap, SB resection in 2015, ex lap, ALBINA in 2018, DVT, PE, on Xarelto, IVC filter, chronic leg swelling, and anasarca, came in to the ED after being sent by PCP Dr. Ross for generalized weakness and hypotension during office visit. Patient was seen by PCP for weakness and chills earlier today and was sent to the ED after she was noted to be hypotensive with BP 80/40. Patient denies fever, SOB, palpitations, changes to her bowel or urinary habits, abdominal pain, urinary symptoms or any other acute complaints. she has chronic anemia with Hb baseline 8-9.    In ED, /71, HR 86 (13 Oct 2020 17:53)       ROS:  Negative except for:    PAST MEDICAL & SURGICAL HISTORY:  Anasarca    Pulmonary embolism    DVT (deep venous thrombosis)    SBO (small bowel obstruction)    H/O exploratory laparotomy        SOCIAL HISTORY:    FAMILY HISTORY:  No pertinent family history in first degree relatives        MEDICATIONS  (STANDING):  cefepime   IVPB      cefepime   IVPB 1000 milliGRAM(s) IV Intermittent every 8 hours  lactulose Syrup 20 Gram(s) Oral every 4 hours  metroNIDAZOLE  IVPB 500 milliGRAM(s) IV Intermittent every 8 hours  metroNIDAZOLE  IVPB      pantoprazole  Injectable 40 milliGRAM(s) IV Push two times a day  potassium chloride   Powder 40 milliEquivalent(s) Oral every 2 hours  senna 2 Tablet(s) Oral at bedtime    MEDICATIONS  (PRN):      Allergies    No Known Allergies    Intolerances        Vital Signs Last 24 Hrs  T(C): 36.6 (19 Oct 2020 07:53), Max: 36.6 (19 Oct 2020 07:53)  T(F): 97.8 (19 Oct 2020 07:53), Max: 97.8 (19 Oct 2020 07:53)  HR: 89 (19 Oct 2020 07:53) (89 - 92)  BP: 85/60 (19 Oct 2020 07:53) (85/60 - 101/69)  BP(mean): 65 (19 Oct 2020 07:53) (65 - 65)  RR: 14 (19 Oct 2020 07:53) (14 - 16)  SpO2: 98% (19 Oct 2020 07:53) (98% - 100%)    PHYSICAL EXAM  General: adult in NAD  HEENT: clear oropharynx, anicteric sclera, pink conjunctiva  Neck: supple  CV: normal S1/S2 with no murmur rubs or gallops  Lungs: positive air movement b/l ant lungs,clear to auscultation, no wheezes, no rales  Abdomen: soft non-tender non-distended, no hepatosplenomegaly  Ext: no clubbing cyanosis or edema  Skin: no rashes and no petechiae  Neuro: alert and oriented X 4, no focal deficits      LABS:                          8.5    6.15  )-----------( 119      ( 19 Oct 2020 06:52 )             25.0         Mean Cell Volume : 92.3 fl  Mean Cell Hemoglobin : 31.4 pg  Mean Cell Hemoglobin Concentration : 34.0 gm/dL  Auto Neutrophil # : x  Auto Lymphocyte # : x  Auto Monocyte # : x  Auto Eosinophil # : x  Auto Basophil # : x  Auto Neutrophil % : x  Auto Lymphocyte % : x  Auto Monocyte % : x  Auto Eosinophil % : x  Auto Basophil % : x      Serial CBC's  10-19 @ 06:52  Hct-25.0 / Hgb-8.5 / Plat-119 / RBC-2.71 / WBC-6.15  Serial CBC's  10-18 @ 06:48  Hct-25.9 / Hgb-8.9 / Plat-150 / RBC-2.84 / WBC-3.95  Serial CBC's  10-17 @ 06:33  Hct-26.2 / Hgb-8.9 / Plat-163 / RBC-2.84 / WBC-5.81  Serial CBC's  10-16 @ 06:44  Hct-27.7 / Hgb-9.1 / Plat-199 / RBC-2.99 / WBC-6.08  Serial CBC's  10-15 @ 18:14  Hct-29.1 / Hgb-9.4 / Plat-190 / RBC-3.04 / WBC-6.00      10-19    144  |  116<H>  |  10  ----------------------------<  156<H>  3.3<L>   |  21<L>  |  0.68    Ca    7.1<L>      19 Oct 2020 06:52  Mg     1.5     10-19            Ferritin, Serum: 181 ng/mL (10-14 @ 09:28)  Folate, Serum: >20.0 ng/mL (10-14 @ 09:28)  Vitamin B12, Serum: 1031 pg/mL (10-14 @ 09:28)  Iron - Total Binding Capacity.: 66 ug/dL (10-14 @ 06:49)              BLOOD SMEAR INTERPRETATION:       RADIOLOGY & ADDITIONAL STUDIES:    < from: CT Abdomen and Pelvis w/ IV Cont (10.13.20 @ 18:50) >  PROCEDURE:  CT of the Abdomen and Pelvis was performed with intravenous contrast.  Intravenous contrast: 90 cc Omnipaque 350  Oral contrast: None.  Sagittal and coronal reformats were performed.    FINDINGS:  LOWER CHEST: Clear.    LIVER: Normal.  BILEDUCTS: Nondilated.  GALLBLADDER: Gallstones.  SPLEEN: Normal.  PANCREAS: Diffuse atrophy. 1.5 cm pancreatic versus peripancreatic nodule.  ADRENALS: Normal.  KIDNEYS/URETERS: No calculi, hydronephrosis, or renal mass.    BLADDER: Underdistended limiting evaluation.  REPRODUCTIVE ORGANS: No uterine or adnexal abnormality.    BOWEL: Diffuse dilatation of small bowel loops without a clear transition is slightly increased, likely an ileus. Colon is decompressed.  PERITONEUM: Decreased moderate ascites. No free air or collection.  VESSELS: IVC filter. Normal caliber aorta.  RETROPERITONEUM/LYMPH NODES: No adenopathy.  ABDOMINAL WALL: Diffuse body wall edema.  BONES: No acute bony abnormality.    IMPRESSION:    Diffuse dilatation of small bowel loops without a clear transition is slightly increased, likely an ileus    Decreased moderate ascites.    1.5 cm pancreatic versus peripancreatic soft tissue nodule      < end of copied text >

## 2020-10-20 NOTE — PROGRESS NOTE ADULT - SUBJECTIVE AND OBJECTIVE BOX
Patient is seen and examined at the bed side, is afebrile.  The WBC count and creatinine stay normal.         REVIEW OF SYSTEMS: All other review systems are negative        ALLERGIES: No Known Allergies        Vital Signs Last 24 Hrs  T(C): 36.2 (20 Oct 2020 15:59), Max: 37 (19 Oct 2020 23:54)  T(F): 97.1 (20 Oct 2020 15:59), Max: 98.6 (19 Oct 2020 23:54)  HR: 81 (20 Oct 2020 15:59) (81 - 91)  BP: 85/51 (20 Oct 2020 15:59) (78/56 - 122/95)  BP(mean): 101 (20 Oct 2020 08:11) (101 - 101)  RR: 17 (20 Oct 2020 15:59) (14 - 18)  SpO2: 100% (20 Oct 2020 15:59) (100% - 100%)        PHYSICAL EXAM:  GENERAL: Not in distress  CHEST/LUNG: Not using accessory muscles   HEART: s1 and s2 present  ABDOMEN:  Nontender and  Nondistended  EXTREMITIES: No pedal  edema  CNS: Awake and Alert        LABS:                        8.8    5.59  )-----------( 120      ( 20 Oct 2020 07:08 )             26.6                           8.5    6.15  )-----------( 119      ( 19 Oct 2020 06:52 )             25.0         10-20    145  |  123<H>  |  10  ----------------------------<  88  x    |  13<L>  |  0.74    Ca    6.7<L>      20 Oct 2020 17:03  Phos  2.6     10-20  Mg     2.0     10-20    TPro  3.8<L>  /  Alb  1.5<L>  /  TBili  1.9<H>  /  DBili  x   /  AST  120<H>  /  ALT  63<H>  /  AlkPhos  105  10-20    10-19    144  |  116<H>  |  10  ----------------------------<  156<H>  3.3<L>   |  21<L>  |  0.68    Ca    7.1<L>      19 Oct 2020 06:52  Mg     1.5     10-19      10-18    146<H>  |  118<H>  |  11  ----------------------------<  131<H>  3.2<L>   |  21<L>  |  0.77    Ca    7.5<L>      18 Oct 2020 06:48  Mg     1.6     10-18    TPro  4.0<L>  /  Alb  1.8<L>  /  TBili  1.6<H>  /  DBili  x   /  AST  34  /  ALT  46  /  AlkPhos  90  10-17      Procalcitonin, Serum (10.15.20 @ 11:48)   Procalcitonin, Serum: 0.16: Procalcitonin (PCT) Interpretation (ng/mL) - Diagnosis of systemic   bacterial infection/sepsis       MEDICATIONS  (STANDING):    cefepime   IVPB      cefepime   IVPB 1000 milliGRAM(s) IV Intermittent every 8 hours  lactulose Syrup 20 Gram(s) Oral every 4 hours  metroNIDAZOLE  IVPB 500 milliGRAM(s) IV Intermittent every 8 hours  metroNIDAZOLE  IVPB      pantoprazole  Injectable 40 milliGRAM(s) IV Push two times a day  rivaroxaban 20 milliGRAM(s) Oral with dinner  senna 2 Tablet(s) Oral at bedtime        RADIOLOGY & ADDITIONAL TESTS:    Culture - Blood in AM (10.16.20 @ 10:13)   Specimen Source: .Blood Blood-Peripheral   Culture Results:   No growth to date.     Culture - Blood in AM (10.16.20 @ 10:13)   Specimen Source: .Blood Blood-Peripheral   Culture Results:   No growth to date.     Culture - Blood (10.13.20 @ 22:23)   - Multidrug (KPC pos) resistant organism: Nondet   - Staphylococcus aureus: Nondet   - Methicillin resistant Staphylococcus aureus (MRSA): Nondet   - Coagulase negative Staphylococcus: Nondet   - Enterococcus species: Nondet   - Vancomycin resistant Enterococcus sp.: Nondet   - Escherichia coli: Nondet   - Klebsiella oxytoca: Nondet   - Klebsiella pneumoniae: Nondet   - Serratia marcescens: Nondet   - Haemophilus influenzae: Nondet   - Listeria monocytogenes: Nondet   - Neisseria meningitidis: Nondet   - Pseudomonas aeruginosa: Nondet   - Acinetobacter baumanii: Nondet   - Enterobacter cloacae complex: Nondet   - Streptococcus sp. (Not Grp A, B or S pneumoniae): Nondet   - Streptococcus agalactiae (Group B): Nondet   - Streptococcus pyogenes (Group A): Nondet   - Streptococcus pneumoniae: Nondet   - Candida albicans: Nondet   - Candida glabrata: Nondet   - Candida krusei: Nondet   - Candida parapsilosis: Nondet   - Candida tropicalis: Nondet   Gram Stain:   Growth in anaerobic bottle: Gram Negative Rods   Specimen Source: .Blood Blood-Peripheral   Organism: Blood Culture PCR   Culture Results:   Growth in anaerobic bottle: Bacteroides fragilis   "Susceptibilities not performed"   ***Blood Panel PCR results on this specimen are available   approximately 3 hours after the Gram stain result.***   Gram stain, PCR, and/or culture results may not always   correspond due to difference in methodologies.       Culture - Blood (10.13.20 @ 22:23)   Specimen Source: .Blood Blood-Peripheral   Culture Results:   No growth to date.     Urine Microscopic-Add On (NC) (10.13.20 @ 21:39)   Bacteria: Moderate /HPF   Epithelial Cells: Moderate /HPF   Red Blood Cell - Urine: 5-10 /HPF   White Blood Cell - Urine: 6-10 /HPF     10/13/20 : CT Abdomen and Pelvis w/ IV Cont (10.13.20 @ 18:50) >    Diffuse dilatation of small bowel loops without a clear transition is slightly increased, likely an ileus.  Decreased moderate ascites.    1.5 cm pancreatic versus peripancreatic soft tissue nodule    10/13/20 : Xray Chest 1 View- PORTABLE-Urgent (Xray Chest 1 View- PORTABLE-Urgent .) (10.13.20 @ 16:34) Clear lungs.         Patient is seen and examined at the bed side, is afebrile.  She become hypotensive, BP of 85/51.        REVIEW OF SYSTEMS: All other review systems are negative        ALLERGIES: No Known Allergies        Vital Signs Last 24 Hrs  T(C): 36.2 (20 Oct 2020 15:59), Max: 37 (19 Oct 2020 23:54)  T(F): 97.1 (20 Oct 2020 15:59), Max: 98.6 (19 Oct 2020 23:54)  HR: 81 (20 Oct 2020 15:59) (81 - 91)  BP: 85/51 (20 Oct 2020 15:59) (78/56 - 122/95)  BP(mean): 101 (20 Oct 2020 08:11) (101 - 101)  RR: 17 (20 Oct 2020 15:59) (14 - 18)  SpO2: 100% (20 Oct 2020 15:59) (100% - 100%)        PHYSICAL EXAM:  GENERAL: Not in acute distress  CHEST/LUNG: Not using accessory muscles   HEART: s1 and s2 present  ABDOMEN:  Nontender and  Nondistended  EXTREMITIES: No pedal  edema  CNS: Awake and Alert, but slow today        LABS:                        8.8    5.59  )-----------( 120      ( 20 Oct 2020 07:08 )             26.6                           8.5    6.15  )-----------( 119      ( 19 Oct 2020 06:52 )             25.0         10-20    145  |  123<H>  |  10  ----------------------------<  88  x    |  13<L>  |  0.74    Ca    6.7<L>      20 Oct 2020 17:03  Phos  2.6     10-20  Mg     2.0     10-20    TPro  3.8<L>  /  Alb  1.5<L>  /  TBili  1.9<H>  /  DBili  x   /  AST  120<H>  /  ALT  63<H>  /  AlkPhos  105  10-20    10-19    144  |  116<H>  |  10  ----------------------------<  156<H>  3.3<L>   |  21<L>  |  0.68    Ca    7.1<L>      19 Oct 2020 06:52  Mg     1.5     10-19      10-18    146<H>  |  118<H>  |  11  ----------------------------<  131<H>  3.2<L>   |  21<L>  |  0.77    Ca    7.5<L>      18 Oct 2020 06:48  Mg     1.6     10-18    TPro  4.0<L>  /  Alb  1.8<L>  /  TBili  1.6<H>  /  DBili  x   /  AST  34  /  ALT  46  /  AlkPhos  90  10-17      Procalcitonin, Serum (10.15.20 @ 11:48)   Procalcitonin, Serum: 0.16: Procalcitonin (PCT) Interpretation (ng/mL) - Diagnosis of systemic   bacterial infection/sepsis       MEDICATIONS  (STANDING):    cefepime   IVPB      cefepime   IVPB 1000 milliGRAM(s) IV Intermittent every 8 hours  lactulose Syrup 20 Gram(s) Oral every 4 hours  metroNIDAZOLE  IVPB 500 milliGRAM(s) IV Intermittent every 8 hours  metroNIDAZOLE  IVPB      pantoprazole  Injectable 40 milliGRAM(s) IV Push two times a day  rivaroxaban 20 milliGRAM(s) Oral with dinner  senna 2 Tablet(s) Oral at bedtime        RADIOLOGY & ADDITIONAL TESTS:    Culture - Blood in AM (10.16.20 @ 10:13)   Specimen Source: .Blood Blood-Peripheral   Culture Results:   No growth to date.     Culture - Blood in AM (10.16.20 @ 10:13)   Specimen Source: .Blood Blood-Peripheral   Culture Results:   No growth to date.     Culture - Blood (10.13.20 @ 22:23)   - Multidrug (KPC pos) resistant organism: Nondet   - Staphylococcus aureus: Nondet   - Methicillin resistant Staphylococcus aureus (MRSA): Nondet   - Coagulase negative Staphylococcus: Nondet   - Enterococcus species: Nondet   - Vancomycin resistant Enterococcus sp.: Nondet   - Escherichia coli: Nondet   - Klebsiella oxytoca: Nondet   - Klebsiella pneumoniae: Nondet   - Serratia marcescens: Nondet   - Haemophilus influenzae: Nondet   - Listeria monocytogenes: Nondet   - Neisseria meningitidis: Nondet   - Pseudomonas aeruginosa: Nondet   - Acinetobacter baumanii: Nondet   - Enterobacter cloacae complex: Nondet   - Streptococcus sp. (Not Grp A, B or S pneumoniae): Nondet   - Streptococcus agalactiae (Group B): Nondet   - Streptococcus pyogenes (Group A): Nondet   - Streptococcus pneumoniae: Nondet   - Candida albicans: Nondet   - Candida glabrata: Nondet   - Candida krusei: Nondet   - Candida parapsilosis: Nondet   - Candida tropicalis: Nondet   Gram Stain:   Growth in anaerobic bottle: Gram Negative Rods   Specimen Source: .Blood Blood-Peripheral   Organism: Blood Culture PCR   Culture Results:   Growth in anaerobic bottle: Bacteroides fragilis   "Susceptibilities not performed"   ***Blood Panel PCR results on this specimen are available   approximately 3 hours after the Gram stain result.***   Gram stain, PCR, and/or culture results may not always   correspond due to difference in methodologies.       Culture - Blood (10.13.20 @ 22:23)   Specimen Source: .Blood Blood-Peripheral   Culture Results:   No growth to date.     Urine Microscopic-Add On (NC) (10.13.20 @ 21:39)   Bacteria: Moderate /HPF   Epithelial Cells: Moderate /HPF   Red Blood Cell - Urine: 5-10 /HPF   White Blood Cell - Urine: 6-10 /HPF     10/13/20 : CT Abdomen and Pelvis w/ IV Cont (10.13.20 @ 18:50) >    Diffuse dilatation of small bowel loops without a clear transition is slightly increased, likely an ileus.  Decreased moderate ascites.    1.5 cm pancreatic versus peripancreatic soft tissue nodule    10/13/20 : Xray Chest 1 View- PORTABLE-Urgent (Xray Chest 1 View- PORTABLE-Urgent .) (10.13.20 @ 16:34) Clear lungs.

## 2020-10-20 NOTE — PROGRESS NOTE ADULT - PROBLEM SELECTOR PLAN 3
S/P 1 unit PRBC on 10/15  Hemoglobin stable  Per heme/onc, most likely malnutrition from short bowel syndrome   Occult positive, but no signs of melena or hematochezia on rectal exam by GI  No clinically significant GI bleeding at this time   Transfuse for Hb < 7  Xarelto held   GI Dr. Isidro Schumacher/onc Dr. Rivera S/P 1 unit PRBC on 10/15  Hemoglobin stable  Per heme/onc, most likely malnutrition from short bowel syndrome   Occult positive, but no signs of melena or hematochezia on rectal exam by GI  No clinically significant GI bleeding at this time   Transfuse for Hb < 7  Xarelto held, will restart today   GI Dr. Felix   Heme/onc Dr. Rivera

## 2020-10-20 NOTE — CHART NOTE - NSCHARTNOTEFT_GEN_A_CORE
EVENT:     OBJECTIVE:  Vital Signs Last 24 Hrs  T(C): 33 (20 Oct 2020 18:34), Max: 37 (19 Oct 2020 23:54)  T(F): 91.4 (20 Oct 2020 18:34), Max: 98.6 (19 Oct 2020 23:54)  HR: 67 (20 Oct 2020 18:34) (67 - 91)  BP: 85/61 (20 Oct 2020 18:34) (78/56 - 122/95)  BP(mean): 101 (20 Oct 2020 08:11) (101 - 101)  RR: 16 (20 Oct 2020 18:34) (14 - 18)  SpO2: 100% (20 Oct 2020 18:34) (100% - 100%)    FOCUSED PHYSICAL EXAM:    LABS:                        9.3    6.63  )-----------( 108      ( 20 Oct 2020 18:17 )             28.5     10-20    145  |  120<H>  |  10  ----------------------------<  88  4.2   |  16<L>  |  0.78    Ca    7.1<L>      20 Oct 2020 18:17  Phos  2.6     10-20  Mg     2.0     10-20    TPro  4.0<L>  /  Alb  1.6<L>  /  TBili  2.0<H>  /  DBili  x   /  AST  161<H>  /  ALT  80<H>  /  AlkPhos  117  10-20      EKG:   IMGAGING:    ASSESSMENT:  HPI:  65y Female from home, lives with daughter, ambulates with walker with PMH of recurrent SBO's, s/p exp lap, SB resection in 2015, ex lap, ALBINA in 2018, DVT, PE, on Xarelto, IVC filter, chronic leg swelling, and anasarca, came in to the ED after being sent by PCP Dr. Ross for generalized weakness and hypotension during office visit. Patient was seen by PCP for weakness and chills earlier today and was sent to the ED after she was noted to be hypotensive with BP 80/40. Patient denies fever, SOB, palpitations, changes to her bowel or urinary habits, abdominal pain, urinary symptoms or any other acute complaints.    In ED, /71, HR 86 (13 Oct 2020 17:53)      PLAN:     FOLLOW UP / RESULT: EVENT: Lactate, Blood: 4.2 mmol/L (10.20.20 @ 18:17)    BRIEF HPI:         OBJECTIVE:  Vital Signs Last 24 Hrs  T(C): 33 (20 Oct 2020 18:34), Max: 37 (19 Oct 2020 23:54)  T(F): 91.4 (20 Oct 2020 18:34), Max: 98.6 (19 Oct 2020 23:54)  HR: 67 (20 Oct 2020 18:34) (67 - 91)  BP: 85/61 (20 Oct 2020 18:34) (78/56 - 122/95)  BP(mean): 101 (20 Oct 2020 08:11) (101 - 101)  RR: 16 (20 Oct 2020 18:34) (14 - 18)  SpO2: 100% (20 Oct 2020 18:34) (100% - 100%)    FOCUSED PHYSICAL EXAM:    LABS:                        9.3    6.63  )-----------( 108      ( 20 Oct 2020 18:17 )             28.5     10-20    145  |  120<H>  |  10  ----------------------------<  88  4.2   |  16<L>  |  0.78    Ca    7.1<L>      20 Oct 2020 18:17  Phos  2.6     10-20  Mg     2.0     10-20    TPro  4.0<L>  /  Alb  1.6<L>  /  TBili  2.0<H>  /  DBili  x   /  AST  161<H>  /  ALT  80<H>  /  AlkPhos  117  10-20      EKG:   IMGAGING:    ASSESSMENT:  HPI:  65y Female from home, lives with daughter, ambulates with walker with PMH of recurrent SBO's, s/p exp lap, SB resection in 2015, ex lap, ALBINA in 2018, DVT, PE, on Xarelto, IVC filter, chronic leg swelling, and anasarca, came in to the ED after being sent by PCP Dr. Ross for generalized weakness and hypotension during office visit. Patient was seen by PCP for weakness and chills earlier today and was sent to the ED after she was noted to be hypotensive with BP 80/40. Patient denies fever, SOB, palpitations, changes to her bowel or urinary habits, abdominal pain, urinary symptoms or any other acute complaints.    In ED, /71, HR 86 (13 Oct 2020 17:53)      PLAN:     FOLLOW UP / RESULT: EVENT: Lactate, Blood: 4.2 mmol/L (10.20.20 @ 18:17)    BRIEF HPI:  65y Female from home, lives with daughter, ambulates with walker with PMH of recurrent SBO's, s/p exp lap, SB resection in 2015, ex lap, ALBINA in 2018, DVT, PE, on Xarelto, IVC filter, chronic leg swelling, and anasarca, came in to the ED after being sent by PCP Dr. Ross for generalized weakness and hypotension during office visit. Admitted for hypotension. S/P fluid resuscitation and albumin in ED. Blood cultures with bacteroides fragilis. ID consulted, source likely GI, treated with antibiotics until 10/26. Hospital course complicated by worsening anemia, FOBT positive, s/p 1 unit PRBC on 10/9. Now hypothermic, hypotensive and lactic acidemia.    OBJECTIVE:  Vital Signs Last 24 Hrs  T(C): 33 (20 Oct 2020 18:34), Max: 37 (19 Oct 2020 23:54)  T(F): 91.4 (20 Oct 2020 18:34), Max: 98.6 (19 Oct 2020 23:54)  HR: 67 (20 Oct 2020 18:34) (67 - 91)  BP: 85/61 (20 Oct 2020 18:34) (78/56 - 122/95)  BP(mean): 101 (20 Oct 2020 08:11) (101 - 101)  RR: 16 (20 Oct 2020 18:34) (14 - 18)  SpO2: 100% (20 Oct 2020 18:34) (100% - 100%)    FOCUSED PHYSICAL EXAM:  NEURO: Sluggish, oriented to name, place.  RESP: Even, unlabored  CV: S1, S2, regular  EXT: LROM, 1+ edema  ABD: Anasarca    LABS:                        9.3    6.63  )-----------( 108      ( 20 Oct 2020 18:17 )             28.5     10-20    145  |  120<H>  |  10  ----------------------------<  88  4.2   |  16<L>  |  0.78    Ca    7.1<L>      20 Oct 2020 18:17  Phos  2.6     10-20  Mg     2.0     10-20    TPro  4.0<L>  /  Alb  1.6<L>  /  TBili  2.0<H>  /  DBili  x   /  AST  161<H>  /  ALT  80<H>  /  AlkPhos  117  10-20      EKG:10/13/20  Ventricular Rate 92 BPM  Atrial Rate 92 BPM  P-R Interval 136 ms  QRS Duration 64 ms  Q-T Interval 354 ms  QTC Calculation(Bazett) 437 ms  P Axis 43 degrees  R Axis -15 degrees  T Axis -5 degrees  Diagnosis Line Normal sinus rhythm  ST & T wave abnormality, consider anterior ischemia  Abnormal ECG    IMAGING:  INTERPRETATION:  CT ABDOMEN AND PELVIS WITH IV CONTRAST    CLINICAL INFORMATION: Peritonitis and ascites    COMPARISON: CT abdomen pelvis 5/19/2020  PROCEDURE:  CT of the Abdomen and Pelvis was performed with intravenous contrast.  Intravenous contrast: 90 cc Omnipaque 350  Oral contrast: None.  Sagittal and coronal reformats were performed.  FINDINGS:  LOWER CHEST: Clear.  LIVER: Normal.  BILEDUCTS: Nondilated.  GALLBLADDER: Gallstones.  SPLEEN: Normal.  PANCREAS: Diffuse atrophy. 1.5 cm pancreatic versus peripancreatic nodule.  ADRENALS: Normal.  KIDNEYS/URETERS: No calculi, hydronephrosis, or renal mass.  BLADDER: Underdistended limiting evaluation.  REPRODUCTIVE ORGANS: No uterine or adnexal abnormality.  BOWEL: Diffuse dilatation of small bowel loops without a clear transition is slightly increased, likely an ileus. Colon is decompressed.  PERITONEUM: Decreased moderate ascites. No free air or collection.  VESSELS: IVC filter. Normal caliber aorta.  RETROPERITONEUM/LYMPH NODES: No adenopathy.  ABDOMINAL WALL: Diffuse body wall edema.  BONES: No acute bony abnormality.  IMPRESSION:  Diffuse dilatation of small bowel loops without a clear transition is slightly increased, likely an ileus  Decreased moderate ascites.  1.5 cm pancreatic versus peripancreatic soft tissue nodule      EXAM:  CT BRAIN                        PROCEDURE DATE:  10/16/2020    INTERPRETATION:  INDICATION:  Altered mental status. Hypotension.  TECHNIQUE:  A non contrast 2.5mm axial CT study of the brain was performed from skull base to vertex.Coronal and sagittal reformations were generated from the axial data.  COMPARISON EXAMINATION:  No prior  FINDINGS:  HEMISPHERES:  No mass or space occupying lesion.  No acute ischemic changes or hemorrhagic foci are suggested.  VENTRICLES:  Midline and normal in size.  POSTERIOR FOSSA:  The brain stem and cerebellum are unremarkable.  No CP angle lesion noted.  EXTRACEREBRAL SPACES:  No subdural or epidural collections are noted.  SKULL BASE AND CALVARIUM:  Appears intact.  No fracture or destructive lesion is identified.  SINUSES AND MASTOIDS:  Moderate mucosal disease noted in the right maxillary sinus.  MISCELLANEOUS:  No orbital or suprasellar abnormality noted.  IMPRESSION:  1)  unremarkable CT study of the brain  2)  moderate mucosal disease noted in the right maxillary sinus.    EXAM:  XR CHEST PORTABLE URGENT 1V                        PROCEDURE DATE:  10/13/2020    INTERPRETATION:  DATE OF STUDY: 10/13/2020  PRIOR:5/18/20  CLINICAL INDICATION: Hypotension; weakness.  TECHNIQUE: portable chest.  FINDINGS:  The cardiomediastinal silhouette is within normal limits.  Clearing of previous left basilar infiltrate and left pleural effusion.  Lungs are clear. No pneumothorax.  There are degenerative changes of the thoracic spine.  Calcified gallstones seen in right upper quadrant.  IMPRESSION:  Clear lungs.              ASSESSMENT:  HPI:  65y Female from home, lives with daughter, ambulates with walker with PMH of recurrent SBO's, s/p exp lap, SB resection in 2015, ex lap, ALBINA in 2018, DVT, PE, on Xarelto, IVC filter, chronic leg swelling, and anasarca, came in to the ED after being sent by PCP Dr. Ross for generalized weakness and hypotension during office visit. Patient was seen by PCP for weakness and chills earlier today and was sent to the ED after she was noted to be hypotensive with BP 80/40. Patient denies fever, SOB, palpitations, changes to her bowel or urinary habits, abdominal pain, urinary symptoms or any other acute complaints.    In ED, /71, HR 86 (13 Oct 2020 17:53)      PLAN:     FOLLOW UP / RESULT: EVENT: Lactate, Blood: 4.2 mmol/L (10.20.20 @ 18:17)    BRIEF HPI:  65y Female from home, lives with daughter, ambulates with walker with PMH of recurrent SBO's, s/p exp lap, SB resection in 2015, ex lap, ALBINA in 2018, DVT, PE, on Xarelto, IVC filter, chronic leg swelling, and anasarca, came in to the ED after being sent by PCP Dr. Ross for generalized weakness and hypotension during office visit. Admitted for hypotension. S/P fluid resuscitation and albumin in ED. Blood cultures with bacteroides fragilis. ID consulted, source likely GI, treated with antibiotics until 10/26. Hospital course complicated by worsening anemia, FOBT positive, s/p 1 unit PRBC on 10/9.     OBJECTIVE:  Vital Signs Last 24 Hrs  T(C): 33 (20 Oct 2020 18:34), Max: 37 (19 Oct 2020 23:54)  T(F): 91.4 (20 Oct 2020 18:34), Max: 98.6 (19 Oct 2020 23:54)  HR: 67 (20 Oct 2020 18:34) (67 - 91)  BP: 85/61 (20 Oct 2020 18:34) (78/56 - 122/95)  BP(mean): 101 (20 Oct 2020 08:11) (101 - 101)  RR: 16 (20 Oct 2020 18:34) (14 - 18)  SpO2: 100% (20 Oct 2020 18:34) (100% - 100%)    FOCUSED PHYSICAL EXAM:  NEURO: Sluggish, oriented to name, place.  RESP: Even, unlabored  CV: S1, S2, regular  EXT: LROM, 1+ edema  ABD: Anasarca    LABS:                        9.3    6.63  )-----------( 108      ( 20 Oct 2020 18:17 )             28.5     10-20    145  |  120<H>  |  10  ----------------------------<  88  4.2   |  16<L>  |  0.78    Ca    7.1<L>      20 Oct 2020 18:17  Phos  2.6     10-20  Mg     2.0     10-20    TPro  4.0<L>  /  Alb  1.6<L>  /  TBili  2.0<H>  /  DBili  x   /  AST  161<H>  /  ALT  80<H>  /  AlkPhos  117  10-20    Culture Results: URINE  >=3 organisms. Probable collection contamination. (10.14.20 @ 03:43)    Culture Results: BLOOD  No growth to date. (10.16.20 @ 10:13)  Specimen Source: .Blood Blood-Peripheral (10.16.20 @ 10:13)    EKG:10/13/20  Ventricular Rate 92 BPM  Atrial Rate 92 BPM  P-R Interval 136 ms  QRS Duration 64 ms  Q-T Interval 354 ms  QTC Calculation(Bazett) 437 ms  P Axis 43 degrees  R Axis -15 degrees  T Axis -5 degrees  Diagnosis Line Normal sinus rhythm  ST & T wave abnormality, consider anterior ischemia  Abnormal ECG    IMAGING:  INTERPRETATION:  CT ABDOMEN AND PELVIS WITH IV CONTRAST  CLINICAL INFORMATION: Peritonitis and ascites  COMPARISON: CT abdomen pelvis 5/19/2020  PROCEDURE:  CT of the Abdomen and Pelvis was performed with intravenous contrast.  Intravenous contrast: 90 cc Omnipaque 350  Oral contrast: None.  Sagittal and coronal reformats were performed.  FINDINGS:  LOWER CHEST: Clear.  LIVER: Normal.  BILEDUCTS: Nondilated.  GALLBLADDER: Gallstones.  SPLEEN: Normal.  PANCREAS: Diffuse atrophy. 1.5 cm pancreatic versus peripancreatic nodule.  ADRENALS: Normal.  KIDNEYS/URETERS: No calculi, hydronephrosis, or renal mass.  BLADDER: Underdistended limiting evaluation.  REPRODUCTIVE ORGANS: No uterine or adnexal abnormality.  BOWEL: Diffuse dilatation of small bowel loops without a clear transition is slightly increased, likely an ileus. Colon is decompressed.  PERITONEUM: Decreased moderate ascites. No free air or collection.  VESSELS: IVC filter. Normal caliber aorta.  RETROPERITONEUM/LYMPH NODES: No adenopathy.  ABDOMINAL WALL: Diffuse body wall edema.  BONES: No acute bony abnormality.  IMPRESSION:  Diffuse dilatation of small bowel loops without a clear transition is slightly increased, likely an ileus  Decreased moderate ascites.  1.5 cm pancreatic versus peripancreatic soft tissue nodule    EXAM:  CT BRAIN                        PROCEDURE DATE:  10/16/2020    INTERPRETATION:  INDICATION:  Altered mental status. Hypotension.  TECHNIQUE:  A non contrast 2.5mm axial CT study of the brain was performed from skull base to vertex. Coronal and sagittal reformations were generated from the axial data.  COMPARISON EXAMINATION:  No prior  FINDINGS:  HEMISPHERES:  No mass or space occupying lesion.  No acute ischemic changes or hemorrhagic foci are suggested.  VENTRICLES:  Midline and normal in size.  POSTERIOR FOSSA:  The brain stem and cerebellum are unremarkable.  No CP angle lesion noted.  EXTRACEREBRAL SPACES:  No subdural or epidural collections are noted.  SKULL BASE AND CALVARIUM:  Appears intact.  No fracture or destructive lesion is identified.  SINUSES AND MASTOIDS:  Moderate mucosal disease noted in the right maxillary sinus.  MISCELLANEOUS:  No orbital or suprasellar abnormality noted.  IMPRESSION:  1)  unremarkable CT study of the brain  2)  moderate mucosal disease noted in the right maxillary sinus.    EXAM:  XR CHEST PORTABLE URGENT 1V                        PROCEDURE DATE:  10/13/2020    INTERPRETATION:  DATE OF STUDY: 10/13/2020  PRIOR:5/18/20  CLINICAL INDICATION: Hypotension; weakness.  TECHNIQUE: portable chest.  FINDINGS:  The cardiomediastinal silhouette is within normal limits.  Clearing of previous left basilar infiltrate and left pleural effusion.  Lungs are clear. No pneumothorax.  There are degenerative changes of the thoracic spine.  Calcified gallstones seen in right upper quadrant.  IMPRESSION:  Clear lungs.    ASSESSMENT: 65y Female from home PMH of recurrent SBO's, s/p exp lap, SB resection in 2015, ex lap, ALBINA in 2018, DVT, PE, on Xarelto, IVC filter, chronic leg swelling, and anasarca, came in to the ED after being sent by PCP Dr. Ross for generalized weakness and hypotension during office visit. hospital day #7. Now hypothermic, hypotensive and lactic acidemia. Consulted by ICU, s/p boluses, repeat lactate pending. Midodrine added.    PROBLEM: SIRS due to Bacteroides fragilis Bacteremia  PLAN:   1. Lactated ringers. 1000 milliliter(s) (75 mL/Hr) IV Continuous started  2. Cont meropenem  IVPB 1000 jaycob GRAM(s) IV Intermittent every 8 hours  3. Cont metronidazole  IVPB 500 jaycob GRAM(s) IV Intermittent every 8 hours  4. Midodrine. 10 jaycob GRAM(s) Oral three times a day started    FOLLOW UP / RESULT: Repeat lactate MN

## 2020-10-20 NOTE — CONSULT NOTE ADULT - ASSESSMENT
Onychomycosis x 6  Tylomas/Hyperkeratosis bilateral foot  PVD/VI  Xerosis  Hx of recurrent SBO's, s/p exp lap, SB resection in 2015, ex lap, ALBINA in 2018, DVT, PE, on Xarelto, IVC filter, chronic leg swelling, and anasarca,      Plan:  Examined patient, discussed treatment options with patient  Perform aseptic debridement of all toenails with out complications  Perform debridement of hyperkeratotic lesions bilateral plantar foot  Patient will benefit from lac-hydrin 12% cream twice out-patient  Continue elevation of bilateral LE and off-load bilateral heel  Educated patient on proper foot care

## 2020-10-20 NOTE — PROGRESS NOTE ADULT - ASSESSMENT
Patient is a 65y old  Female from home, lives with daughter, ambulates with walker with PMH of recurrent SBO's, s/p exp lap, SB resection in 2015, ex lap, ALBINA in 2018, DVT, PE, on Xarelto, IVC filter, chronic leg swelling, and anasarca, Now send in to the ER by her PCP, Dr. Ross, for evaluation of  generalized weakness and hypotension BP of 80/40 during office visit. On admission, she found to have no fever and BP was in lower normal range and no Leukocytosis. The CXR is clear and CT abd/pelvis consistent with Ileus. The ID consult requested to assist with evaluation of infectious etiology of episode of hypotension.     # Hypotension  at office- BP of 80/40 - resolved   #  Bacteroides fragilis Bacteremia - 10/13/20 - ? source most likely GI- Repeat Blood Cxs have NGTD 10/16/20  # Ileus  - h/o recurrent SBO and s/p EXp. LAp  # COVID 19 negative       would recommend:    1. OOB to chair   2. Continue Cefepime and  Flagyl to cover Bacteroides until 10/26/20  X 6 more days   3. Monitor kidney function and adjust Abx doses accordingly       Attending Attestation:    Spent more than 40 minutes on total encounter, more than 50 % of the visit was spent counseling and/or coordinating care by the Attending physician. 0Patient is a 65y old  Female from home, lives with daughter, ambulates with walker with PMH of recurrent SBO's, s/p exp lap, SB resection in 2015, ex lap, ALBINA in 2018, DVT, PE, on Xarelto, IVC filter, chronic leg swelling, and anasarca, Now send in to the ER by her PCP, Dr. Ross, for evaluation of  generalized weakness and hypotension BP of 80/40 during office visit. On admission, she found to have no fever and BP was in lower normal range and no Leukocytosis. The CXR is clear and CT abd/pelvis consistent with Ileus. The ID consult requested to assist with evaluation of infectious etiology of episode of hypotension.     # Hypotension  at office- BP of 80/40 - resolved   #  Bacteroides fragilis Bacteremia - 10/13/20 - ? source most likely GI- Repeat Blood Cxs have NGTD 10/16/20  # Ileus  - h/o recurrent SBO and s/p EXp. LAp  # COVID 19 negative       would recommend:    1.  IVF NS bolus  stat  to keep SBP > 90, and if no improvement with 2 liter of NS then Obtain ICU evaluation  2. Monitor Vitals more often and  supportive care  3. Blood cultures X 2, UA and urine culture, CXR  4. Change Cefepime and  Flagyl to Meropenem and dose of Vancomycin to  cover Health care associated infection  5. Monitor kidney function and adjust Abx doses accordingly     d/w Covering NP, Karo      Attending Attestation:    Spent more than 40 minutes on total encounter, more than 50 % of the visit was spent counseling and/or coordinating care by the Attending physician.

## 2020-10-20 NOTE — CHART NOTE - NSCHARTNOTEFT_GEN_A_CORE
EVENT:   - HPI: Patient is a 65y old  Female from home, lives with daughter, ambulates with walker with PMH of recurrent SBO's, s/p exp lap, SB resection in 2015, ex lap, ALBINA in 2018, DVT, PE, on Xarelto, IVC filter, chronic leg swelling, and anasarca, Now send in to the ER by her PCP, Dr. Ross, for evaluation of  generalized weakness and hypotension BP of 80/40 during office visit. On admission, she found to have no fever and BP was in lower normal range and no Leukocytosis. The CXR is clear and CT abd/pelvis consistent with Ileus.     - Vitals reviewed. SBP was found to be in the 70-80. Pt is currently asymptomatic.    OBJECTIVE:  Vital Signs Last 24 Hrs  T(C): 37 (19 Oct 2020 23:54), Max: 37 (19 Oct 2020 23:54)  T(F): 98.6 (19 Oct 2020 23:54), Max: 98.6 (19 Oct 2020 23:54)  HR: 90 (20 Oct 2020 00:41) (85 - 91)  BP: 78/56 (20 Oct 2020 00:41) (78/56 - 91/64)  BP(mean): 70 (19 Oct 2020 15:34) (65 - 70)  RR: 18 (19 Oct 2020 23:54) (14 - 18)  SpO2: 100% (19 Oct 2020 23:54) (98% - 100%)    FOCUSED PHYSICAL EXAM:  Neuro: awake, alert, oriented x 3. No neuro deficit  Cardiovascular: Pulses +2 B/L in lower and upper extremities, HR regular, BP stable, No edema.  Respiratory: Respirations regular, unlabored, breath sounds clear B/L.   GI: Abdomen soft, non-tender, positive bowel sounds.  : no bladder distention noted. No complaints at this time.  Skin: Dry, intact, no bruising, no diaphoresis.    LABS:                        8.5    6.15  )-----------( 119      ( 19 Oct 2020 06:52 )             25.0     10-19    144  |  116<H>  |  10  ----------------------------<  156<H>  3.3<L>   |  21<L>  |  0.68    Ca    7.1<L>      19 Oct 2020 06:52  Mg     1.5     10-19        EKG:   IMAGING:      ASSESSMENT/Problem: Hypotension most likely 2/2 to poor eating      PLAN:   1. Bolus of NS, 1L x 1 dose ordered  2. Recheck BP after completion of IVF bolus  3. Cont present care/treatment

## 2020-10-20 NOTE — CHART NOTE - NSCHARTNOTEFT_GEN_A_CORE
EVENT: Rectal temp 91, persistant hypotension     OBJECTIVE:  Vital Signs Last 24 Hrs  T(C): 36.2 (20 Oct 2020 15:59), Max: 37 (19 Oct 2020 23:54)  T(F): 97.1 (20 Oct 2020 15:59), Max: 98.6 (19 Oct 2020 23:54)  HR: 81 (20 Oct 2020 15:59) (81 - 91)  BP: 85/51 (20 Oct 2020 15:59) (78/56 - 122/95)  BP(mean): 101 (20 Oct 2020 08:11) (101 - 101)  RR: 17 (20 Oct 2020 15:59) (14 - 18)  SpO2: 100% (20 Oct 2020 15:59) (100% - 100%)    FOCUSED PHYSICAL EXAM:  Neuro: awake, alert, oriented x 3. No neuro deficit  Cardiovascular: Pulses +2 B/L in lower and upper extremities, HR regular, BP stable, No edema.  Respiratory: Respirations regular, unlabored, breath sounds clear B/L.   GI: Abdomen soft, non-tender, positive bowel sounds.  : no bladder distention noted. No complaints at this time.  Skin: Dry, intact, no bruising, no diaphoresis.    I&O's      LABS:                        8.8    5.59  )-----------( 120      ( 20 Oct 2020 07:08 )             26.6     10-20    145  |  123<H>  |  10  ----------------------------<  88  5.5<H>   |  13<L>  |  0.74    Ca    6.7<L>      20 Oct 2020 17:03  Phos  2.6     10-20  Mg     2.0     10-20    TPro  3.8<L>  /  Alb  1.5<L>  /  TBili  1.9<H>  /  DBili  x   /  AST  120<H>  /  ALT  63<H>  /  AlkPhos  105  10-20    MICROBIOLOGY:  Blood cultures 10/13 bacteroids fragilis   Blood cultures 10/16 negative     HPI: 65y Female from home, lives with daughter, ambulates with walker with PMH of recurrent SBO's, s/p exp lap, SB resection in 2015, ex lap, ALBINA in 2018, DVT, PE, on Xarelto, IVC filter, chronic leg swelling, and anasarca, came in to the ED after being sent by PCP Dr. Ross for generalized weakness and hypotension during office visit. Admitted for hypotension. S/P fluid resuscitation and albumin in ED. Blood cultures with bacteroides fragilis. ID consulted, source likely GI, treated with antibiotics until 10/26. Hospital course complicated by worsening anemia, FOBT positive, s/p 1 unit PRBC on 10/9.    Called to bedside by RN for low rectal temp. Concern for sepsis/worsening infection.     ASSESSMENT/PLAN    Sepsis secondary to bacteroids bacteremia   - blood cultures x 2 ordered   - Repeat CMP ordered   - lactate ordered  - continue antibiotics - cefepime and flagyl  - NS 1000ml bolus  - warming blanket   - ICU consulted EVENT: Rectal temp 91, persistant hypotension     OBJECTIVE:  Vital Signs Last 24 Hrs  T(C): 36.2 (20 Oct 2020 15:59), Max: 37 (19 Oct 2020 23:54)  T(F): 97.1 (20 Oct 2020 15:59), Max: 98.6 (19 Oct 2020 23:54)  HR: 81 (20 Oct 2020 15:59) (81 - 91)  BP: 85/51 (20 Oct 2020 15:59) (78/56 - 122/95)  BP(mean): 101 (20 Oct 2020 08:11) (101 - 101)  RR: 17 (20 Oct 2020 15:59) (14 - 18)  SpO2: 100% (20 Oct 2020 15:59) (100% - 100%)    FOCUSED PHYSICAL EXAM:  Neuro: awake, alert, oriented x 3. No neuro deficit  Cardiovascular: Pulses +2 B/L in lower and upper extremities, HR regular, BP stable, No edema.  Respiratory: Respirations regular, unlabored, breath sounds clear B/L.   GI: Abdomen soft, non-tender, positive bowel sounds.  : no bladder distention noted. No complaints at this time.  Skin: Dry, intact, no bruising, no diaphoresis.    I&O's      LABS:                        8.8    5.59  )-----------( 120      ( 20 Oct 2020 07:08 )             26.6     10-20    145  |  123<H>  |  10  ----------------------------<  88  5.5<H>   |  13<L>  |  0.74    Ca    6.7<L>      20 Oct 2020 17:03  Phos  2.6     10-20  Mg     2.0     10-20    TPro  3.8<L>  /  Alb  1.5<L>  /  TBili  1.9<H>  /  DBili  x   /  AST  120<H>  /  ALT  63<H>  /  AlkPhos  105  10-20    MICROBIOLOGY:  Blood cultures 10/13 bacteroids fragilis   Blood cultures 10/16 negative     HPI: 65y Female from home, lives with daughter, ambulates with walker with PMH of recurrent SBO's, s/p exp lap, SB resection in 2015, ex lap, ALBINA in 2018, DVT, PE, on Xarelto, IVC filter, chronic leg swelling, and anasarca, came in to the ED after being sent by PCP Dr. Ross for generalized weakness and hypotension during office visit. Admitted for hypotension. S/P fluid resuscitation and albumin in ED. Blood cultures with bacteroides fragilis. ID consulted, source likely GI, treated with antibiotics until 10/26. Hospital course complicated by worsening anemia, FOBT positive, s/p 1 unit PRBC on 10/9.    Called to bedside by RN for low rectal temp. Concern for sepsis/worsening infection.     ASSESSMENT/PLAN    Sepsis secondary to bacteroids bacteremia   - blood cultures x 2 ordered   - Repeat CMP, CBC ordered   - lactate ordered  - continue antibiotics - cefepime and flagyl  - NS 1000ml bolus  - warming blanket   - Plan discussed with Dr. Patricio, unable to reach Dr. Ross   - Attempted to reach patient's daughter Jus Bucio [phone], message left to return call to [phone]  - ICU consulted

## 2020-10-20 NOTE — PROGRESS NOTE ADULT - PROBLEM SELECTOR PLAN 10
PT consulted, pt may need [facility] consulted, recommending [facility]  Pt will need antibiotics until 10/26, if patient ends up going to [facility], family will need to be given choices and pt will need to be accepted - may end up completing antibiotics vs dwell placement

## 2020-10-20 NOTE — PROGRESS NOTE ADULT - SUBJECTIVE AND OBJECTIVE BOX
condition same  BP ws low but she did not feel the difference  still weak    MEDICATIONS  (STANDING):  cefepime   IVPB      cefepime   IVPB 1000 milliGRAM(s) IV Intermittent every 8 hours  lactulose Syrup 20 Gram(s) Oral every 4 hours  metroNIDAZOLE  IVPB 500 milliGRAM(s) IV Intermittent every 8 hours  metroNIDAZOLE  IVPB      pantoprazole  Injectable 40 milliGRAM(s) IV Push two times a day  potassium phosphate IVPB 30 milliMole(s) IV Intermittent once  senna 2 Tablet(s) Oral at bedtime    MEDICATIONS  (PRN):      Allergies    No Known Allergies    Intolerances        Vital Signs Last 24 Hrs  T(C): 36.2 (20 Oct 2020 08:11), Max: 37 (19 Oct 2020 23:54)  T(F): 97.1 (20 Oct 2020 08:11), Max: 98.6 (19 Oct 2020 23:54)  HR: 86 (20 Oct 2020 08:11) (85 - 91)  BP: 122/95 (20 Oct 2020 08:11) (78/56 - 122/95)  BP(mean): 101 (20 Oct 2020 08:11) (70 - 101)  RR: 14 (20 Oct 2020 08:11) (14 - 18)  SpO2: 100% (20 Oct 2020 08:11) (100% - 100%)    PHYSICAL EXAM  General: adult in NAD  HEENT: clear oropharynx, anicteric sclera, pink conjunctiva  Neck: supple  CV: normal S1/S2 with no murmur rubs or gallops  Lungs: positive air movement b/l ant lungs,clear to auscultation, no wheezes, no rales  Abdomen: soft non-tender non-distended, no hepatosplenomegaly  Ext: no clubbing cyanosis or edema  Skin: no rashes and no petechiae  Neuro: alert and oriented X 4, no focal deficits  LABS:                          8.8    5.59  )-----------( 120      ( 20 Oct 2020 07:08 )             26.6         Mean Cell Volume : 93.3 fl  Mean Cell Hemoglobin : 30.9 pg  Mean Cell Hemoglobin Concentration : 33.1 gm/dL  Auto Neutrophil # : x  Auto Lymphocyte # : x  Auto Monocyte # : x  Auto Eosinophil # : x  Auto Basophil # : x  Auto Neutrophil % : x  Auto Lymphocyte % : x  Auto Monocyte % : x  Auto Eosinophil % : x  Auto Basophil % : x    Serial CBC  Hematocrit 26.6  Hemoglobin 8.8  Plat 120  RBC 2.85  WBC 5.59  Serial CBC  Hematocrit 25.0  Hemoglobin 8.5  Plat 119  RBC 2.71  WBC 6.15  Serial CBC  Hematocrit 25.9  Hemoglobin 8.9  Plat 150  RBC 2.84  WBC 3.95  Serial CBC  Hematocrit 26.2  Hemoglobin 8.9  Plat 163  RBC 2.84  WBC 5.81    10-20    144  |  120<H>  |  10  ----------------------------<  89  4.0   |  17<L>  |  0.77    Ca    7.1<L>      20 Oct 2020 07:08  Phos  0.6     10-20  Mg     2.0     10-20    TPro  3.8<L>  /  Alb  1.5<L>  /  TBili  1.9<H>  /  DBili  x   /  AST  120<H>  /  ALT  63<H>  /  AlkPhos  105  10-20          Ferritin, Serum: 181 ng/mL (10-14 @ 09:28)  Folate, Serum: >20.0 ng/mL (10-14 @ 09:28)  Vitamin B12, Serum: 1031 pg/mL (10-14 @ 09:28)  Iron - Total Binding Capacity.: 66 ug/dL (10-14 @ 06:49)            BLOOD SMEAR INTERPRETATION:       RADIOLOGY & ADDITIONAL STUDIES:

## 2020-10-20 NOTE — CONSULT NOTE ADULT - ASSESSMENT
65y Female from home, lives with daughter, ambulates with walker with PMH of recurrent SBO's, s/p exp lap, SB resection in 2015, ex lap, ALBINA in 2018, DVT, PE, on Xarelto, IVC filter, chronic leg swelling, and anasarca, came in to the ED due to hypotension during office visit. Patient was found to be bacteremic with bacteroids fragilis. She was being treated with cefepime and flagyl. She was found to be hypothermic today and ICU was consulted for evaluation. ICU is deferring admission for now and will reevaluate the need for pressors.    Diagnosis:  >Sepsis  >Bacteremia  >Anemia  >Acute DVT   >Encephalopathy    Neuro:  Patient presented with encephalopathy on admission. Patient was back to baseline after treatment with fluids and antibiotics  She is Aox2 at baseline on evaluation    CVS:  Patient is hypotensive and hypothermic secondary to sepsis.  Will continue with cefepime and flagyl  Will give LR bolus, also will start on standing fluids  Will also start on albumin, given albumin level of 1.6  Will start on midodrine to help improve BP.  If Bp still does not improve, ICU will reevaluate  Started on warm blanket on floor    Respiratory:  No issues    Gastrointestinal:  Patient is bacteremic with bacteroids fragilis, likely secondary from intra abdominal source.  Patient had multiple SBO in past. Ct abdomen on admission showed ileus which is resolved  GI, DR Chatterjee deferred doing colonoscopy due to multiple comorbidities.  OE Belly is mildly distended.   C/w Flagyl, cefepime  f/u CXR  f/u repeat culture    ID:  Patient is bacteremic with bacteroids likely Gi source  c/w flagyl and cefepime   Will repeat culture      Nephro:         65y Female from home, lives with daughter, ambulates with walker with PMH of recurrent SBO's, s/p exp lap, SB resection in 2015, ex lap, ALBINA in 2018, DVT, PE, on Xarelto, IVC filter, chronic leg swelling, and anasarca, came in to the ED due to hypotension during office visit. Patient was found to be bacteremic with bacteroids fragilis. She was being treated with cefepime and flagyl. She was found to be hypothermic today and ICU was consulted for evaluation. ICU is deferring admission for now and will reevaluate the need for pressors.    Diagnosis:  >Sepsis  >Bacteremia  >Anemia  >DVT/PE  >Encephalopathy  >Transaminitis    Neuro:  Patient presented with encephalopathy on admission. Patient was back to baseline after treatment with fluids and antibiotics  She is Aox2 at baseline on evaluation    CVS:  Patient is hypotensive and hypothermic secondary to sepsis.  Will continue with cefepime and flagyl  Will give LR bolus, also will start on standing fluids  Will also start on albumin, given albumin level of 1.6  Will start on midodrine to help improve BP.  If Bp still does not improve, ICU will reevaluate  Started on warm blanket on floor    Respiratory:  No issues    Gastrointestinal:  Patient is bacteremic with bacteroids fragilis, likely secondary from intra abdominal source.  Patient had multiple SBO in past. Ct abdomen on admission showed ileus which is resolved  GI, DR Chatterjee deferred doing colonoscopy due to multiple comorbidities.  OE Belly is mildly distended. Last BM was this morning  LFTs are mildly elevated for past two days. Likely due to sepsis.  C/w Flagyl, cefepime  f/u CXR  f/u repeat culture  If patient develops signs of bowel obstruction, consider repeating CT scan or surgical consult    ID:  Patient is bacteremic with bacteroids likely Gi source.  Patient is still septic with lactate of 4.2 and hypotensive and hypothermic  Will give Iv hydration, also on standing fluids  c/w flagyl and cefepime   Will repeat culture  f/u repeat lactate at midnight.  Monitor vitals Q4      Nephro:  Patient has hyperchloremic acidosis with cl of 127, bicarb of 13  Likely secondary to sepsis and IV hydration with NS  Switched fluids to LR  pH is wnl.  Monitor electrolytes and ph.  Monitor respiratory status    Hem Onc:  Has h/o DVt and PE in past. Patient is on xarelto  Patient has anemia secondary to iron def, in setting of malnutrition from short bowel syndrome  Started on iron supplements    Endo:  No issues    GOC:  Full code

## 2020-10-20 NOTE — CONSULT NOTE ADULT - ATTENDING COMMENTS
65y Female from home, lives with daughter, ambulates with walker with PMH of recurrent SBO's, s/p exp lap, SB resection in 2015, ex lap, ALBINA in 2018, DVT, PE, on Xarelto, IVC filter, chronic leg swelling, and anasarca, came in to the ED due to hypotension during office visit. Patient was found to be bacteremic with bacteroids fragilis. She was being treated with cefepime and flagyl. She was found to be hypothermic today and ICU was consulted for evaluation.    Diagnosis:  >Sepsis  >Bacteremia  >Anemia  >Acute DVT   >Encephalopathy   >Critical illness related corticosteroid insufficiency    Neuro:  Patient presented with encephalopathy on admission. Patient was back to baseline after treatment with fluids and antibiotics  She is Aox2 at baseline on evaluation    CVS:  Patient is hypotensive and hypothermic secondary to sepsis.  Will continue with cefepime and flagyl  Will give LR bolus, also will start on standing fluids  Will also start on albumin, given albumin level of 1.6  Will start on midodrine to help improve BP.  If Bp still does not improve, ICU will reevaluate  Started on warm blanket on floor   Trend Lactate    Respiratory:  No issues    Gastrointestinal:  Patient is bacteremic with bacteroids fragilis, likely secondary from intra abdominal source.  Patient had multiple SBO in past. Ct abdomen on admission showed ileus which is resolved  GI deferred doing colonoscopy due to multiple comorbidities.  C/w Flagyl, cefepime  f/u CXR  f/u repeat culture    ID:  Patient is bacteremic with bacteroids likely Gi source  c/w flagyl and cefepime   Will repeat culture      Endo   Start stress dose steroids for presumed CIRCI given hypothermia, hypotension

## 2020-10-20 NOTE — CONSULT NOTE ADULT - SUBJECTIVE AND OBJECTIVE BOX
65y Female with PMH of recurrent SBO's, s/p exp lap, SB resection in 2015, ex lap, ALBINA in 2018, DVT, PE, on Xarelto, IVC filter, chronic leg swelling, and anasarca, admitted with generalized weakness and hypotension and lethargy.  Patient denies fever, SOB, palpitations, changes to her bowel or urinary habits, abdominal pain, urinary symptoms or any other acute complaints.  Consulted for painful long toenails    MEDICATIONS  (STANDING):  cefepime   IVPB      cefepime   IVPB 1000 milliGRAM(s) IV Intermittent every 8 hours  metroNIDAZOLE  IVPB 500 milliGRAM(s) IV Intermittent every 8 hours  metroNIDAZOLE  IVPB      pantoprazole  Injectable 40 milliGRAM(s) IV Push two times a day  senna 2 Tablet(s) Oral at bedtime    MEDICATIONS  (PRN):    Allergies    No Known Allergies    Intolerances      PAST MEDICAL & SURGICAL HISTORY:  Anasarca    Pulmonary embolism    DVT (deep venous thrombosis)    SBO (small bowel obstruction)    H/O exploratory laparotomy      FAMILY HISTORY:  No pertinent family history in first degree relatives      SOCIAL HISTORY: non smoker    Review of Systems:  Constitutional: No fevers.                    Eyes, Ears, Mouth, Throat: No vision loss   Respiratory: No cough.                                Cardiovascular: Uknonw if has chest pain or palpitations  Gastrointestinal: No vomiting.                                         Genitourinary: Uknown if has burning on urination.  Musculoskeletal: No known joint pain.                                                           Dermatologic: No known rash.  Neurological: as per HPI                                                                      Psychiatric: No behavioral problems.  Endocrine: No known hypoglycemia.               Hematologic/Lymphatic: No know easy bleeding.      64 y/o female seen at bedside in [facility], AOX3 for c/o long painful toenails. Denies any trauma  Vital Signs Last 24 Hrs  T(C): 33 (20 Oct 2020 18:34), Max: 37 (19 Oct 2020 23:54)  T(F): 91.4 (20 Oct 2020 18:34), Max: 98.6 (19 Oct 2020 23:54)  HR: 67 (20 Oct 2020 18:34) (67 - 91)  BP: 85/61 (20 Oct 2020 18:34) (78/56 - 122/95)  BP(mean): 101 (20 Oct 2020 08:11) (101 - 101)  RR: 16 (20 Oct 2020 18:34) (14 - 18)  SpO2: 100% (20 Oct 2020 18:34) (100% - 100%)      Derm: Skin is cool, dry, scaly and flaky with atrophic changes bilateral LE. Pedal and digit hair diminished. Nails are elongated, thickened and dystrophic x 6, non-dystophic x 4. Diffused multiple hyperkeratotic lesions plantar foot bilateral. Chronic venous stasis changes bilateral LE. No open wound, no erythema  Vasc: DP 1/4, pt 0/4, Doppler-able pedal pulses bilateral, trace pitting edema bilateral LE  Neuro: Protective sensations diminished bilateral foot to S.W mono-filament  M/S: Laterally deviated hallux bilateral, Dorsally contracted digits 2-5 bilateral.    LABS:                 8.8    5.59  )-----------( 120      ( 20 Oct 2020 07:08 )             26.6             10-20    145  |  123<H>  |  10  ----------------------------<  88  x    |  13<L>  |  0.74    Ca    6.7<L>      20 Oct 2020 17:03  Phos  2.6     10-20  Mg     2.0     10-20    TPro  3.8<L>  /  Alb  1.5<L>  /  TBili  1.9<H>  /  DBili  x   /  AST  120<H>  /  ALT  63<H>  /  AlkPhos  105  10-20

## 2020-10-20 NOTE — PROGRESS NOTE ADULT - ASSESSMENT
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  BC grew bacteroides.    Problem/Recommendation - 1:  Problem: Bacteremia. Recommendation: bacteroides  most likely GI origin  on antibiotics  no fever.    Problem/Recommendation - 2:  ·  Problem: Anemia.  Recommendation: ferritin, b12 folate normal  no hemolysis  most likley due to malnutrition from short bowel syndrome.     Problem/Recommendation - 3:  ·  Problem: Acute deep vein thrombosis (DVT) of other vein of upper extremity.  Recommendation: she has been on xarelto for 2 years.  DVT at left arm and left leg before.  will continue xarelto.

## 2020-10-20 NOTE — PROGRESS NOTE ADULT - SUBJECTIVE AND OBJECTIVE BOX
NP Note discussed with  Primary Attending    65y Female from home, lives with daughter, ambulates with walker with PMH of recurrent SBO's, s/p exp lap, SB resection in 2015, ex lap, ALBINA in 2018, DVT, PE, on Xarelto, IVC filter, chronic leg swelling, and anasarca, came in to the ED after being sent by PCP Dr. Ross for generalized weakness and hypotension during office visit. Admitted for hypotension. S/P fluid resuscitation and albumin in ED. Blood cultures with bacteroides fragilis. ID consulted, source likely GI, treated with antibiotics until 10/26. Hospital course complicated by worsening anemia, FOBT positive, s/p 1 unit PRBC on 10/9. Xarelto held.     INTERVAL HPI/OVERNIGHT EVENTS: no new complaints    MEDICATIONS  (STANDING):  cefepime   IVPB      cefepime   IVPB 1000 milliGRAM(s) IV Intermittent every 8 hours  lactulose Syrup 20 Gram(s) Oral every 4 hours  metroNIDAZOLE  IVPB 500 milliGRAM(s) IV Intermittent every 8 hours  metroNIDAZOLE  IVPB      pantoprazole  Injectable 40 milliGRAM(s) IV Push two times a day  potassium phosphate IVPB 30 milliMole(s) IV Intermittent once  senna 2 Tablet(s) Oral at bedtime    MEDICATIONS  (PRN):      __________________________________________________  REVIEW OF SYSTEMS:    CONSTITUTIONAL: No fever,   EYES: no acute visual disturbances  NECK: No pain or stiffness  RESPIRATORY: No cough; No shortness of breath  CARDIOVASCULAR: No chest pain, no palpitations  GASTROINTESTINAL: No pain. No nausea or vomiting; No diarrhea   NEUROLOGICAL: No headache or numbness, no tremors  MUSCULOSKELETAL: No joint pain, no muscle pain  GENITOURINARY: no dysuria, no frequency, no hesitancy  PSYCHIATRY: no depression , no anxiety  ALL OTHER  ROS negative        Vital Signs Last 24 Hrs  T(C): 36.2 (20 Oct 2020 08:11), Max: 37 (19 Oct 2020 23:54)  T(F): 97.1 (20 Oct 2020 08:11), Max: 98.6 (19 Oct 2020 23:54)  HR: 86 (20 Oct 2020 08:11) (85 - 91)  BP: 122/95 (20 Oct 2020 08:11) (78/56 - 122/95)  BP(mean): 101 (20 Oct 2020 08:11) (70 - 101)  RR: 14 (20 Oct 2020 08:11) (14 - 18)  SpO2: 100% (20 Oct 2020 08:11) (100% - 100%)    ________________________________________________  PHYSICAL EXAM:  GENERAL: NAD  HEENT: Normocephalic;  conjunctivae and sclerae clear; moist mucous membranes;   NECK : supple  CHEST/LUNG: Clear to auscultation bilaterally with good air entry   HEART: S1 S2  regular; no murmurs, gallops or rubs  ABDOMEN: Soft, Nontender, Nondistended; Bowel sounds present  EXTREMITIES: no cyanosis; no edema; no calf tenderness  SKIN: warm and dry; no rash  NERVOUS SYSTEM:  Awake and alert; Oriented  to place, person and time ; no new deficits    _________________________________________________  LABS:                        8.8    5.59  )-----------( 120      ( 20 Oct 2020 07:08 )             26.6     10-20    144  |  120<H>  |  10  ----------------------------<  89  4.0   |  17<L>  |  0.77    Ca    7.1<L>      20 Oct 2020 07:08  Phos  0.6     10-20  Mg     2.0     10-20    TPro  3.8<L>  /  Alb  1.5<L>  /  TBili  1.9<H>  /  DBili  x   /  AST  120<H>  /  ALT  63<H>  /  AlkPhos  105  10-20        CAPILLARY BLOOD GLUCOSE            RADIOLOGY & ADDITIONAL TESTS:    Imaging  Reviewed:  YES/NO    Consultant(s) Notes Reviewed:   YES/ No      Plan of care was discussed with patient and /or primary care giver; all questions and concerns were addressed

## 2020-10-20 NOTE — PROGRESS NOTE ADULT - PROBLEM SELECTOR PLAN 5
PE on CT angio from January 2020  Bilateral DVTs on dopplers January 2019    Has been on Xarelto for 2 years   Xarelto held for anemia, consider restarting PE on CT angio from January 2020  Bilateral DVTs on dopplers January 2019    Has been on Xarelto for 2 years   Xarelto held for anemia, will restart today

## 2020-10-20 NOTE — CONSULT NOTE ADULT - SUBJECTIVE AND OBJECTIVE BOX
Patient is a 65y old  Female who presents with a chief complaint of Hypotension (20 Oct 2020 17:33)      HPI:  65y Female from home, lives with daughter, ambulates with walker with PMH of recurrent SBO's, s/p exp lap, SB resection in 2015, ex lap, ALBINA in 2018, DVT, PE, on Xarelto, IVC filter, chronic leg swelling, and anasarca, came in to the ED after being sent by PCP Dr. Ross for generalized weakness and hypotension during office visit. Patient was seen by PCP for weakness and chills earlier today and was sent to the ED after she was noted to be hypotensive with BP 80/40. Patient denies fever, SOB, palpitations, changes to her bowel or urinary habits, abdominal pain, urinary symptoms or any other acute complaints.    In ED, /71, HR 86 (13 Oct 2020 17:53)    Interval h/o  Patient was hypothermic in the evening. On repeat evaluation she was found to have temp of 91.7. She was also hypotensive which has been present since patient presented in ED. Bp was around baseline since admission. patient was seen and evaluated at bedside with Attending. Patient reports no complains and is not in acute distress.       Allergies    No Known Allergies    Intolerances    MEDICATIONS  (STANDING):  calcium gluconate IVPB 1 Gram(s) IV Intermittent once  cefepime   IVPB 1000 milliGRAM(s) IV Intermittent every 8 hours  cefepime   IVPB      dextrose 50% Injectable 25 milliLiter(s) IV Push once  insulin regular  human recombinant. 5 Unit(s) SubCutaneous once  lactulose Syrup 20 Gram(s) Oral every 4 hours  metroNIDAZOLE  IVPB 500 milliGRAM(s) IV Intermittent every 8 hours  metroNIDAZOLE  IVPB      pantoprazole  Injectable 40 milliGRAM(s) IV Push two times a day  rivaroxaban 20 milliGRAM(s) Oral with dinner  senna 2 Tablet(s) Oral at bedtime  sodium zirconium cyclosilicate 5 Gram(s) Oral once    MEDICATIONS  (PRN):      Daily     Daily     Drug Dosing Weight  Height (cm): 167.6 (13 Oct 2020 13:50)  Weight (kg): 73 (21 May 2020 12:09)  BMI (kg/m2): 26 (13 Oct 2020 13:50)  BSA (m2): 1.82 (13 Oct 2020 13:50)    PAST MEDICAL & SURGICAL HISTORY:  Anasarca    Pulmonary embolism    DVT (deep venous thrombosis)    SBO (small bowel obstruction)    H/O exploratory laparotomy        FAMILY HISTORY:  No pertinent family history in first degree relatives      ADVANCE DIRECTIVES: full code    REVIEW OF SYSTEMS:    CONSTITUTIONAL: No fever, weight loss, or fatigue  EYES: No eye pain, visual disturbances, or discharge  ENMT:  No difficulty hearing, tinnitus, vertigo; No sinus or throat pain  NECK: No pain or stiffness  RESPIRATORY: No cough, wheezing, chills or hemoptysis; No shortness of breath  CARDIOVASCULAR: No chest pain, palpitations, dizziness, or leg swelling  GASTROINTESTINAL: No abdominal or epigastric pain. No nausea, vomiting, or hematemesis; No diarrhea or constipation. No melena or hematochezia.  GENITOURINARY: No dysuria, frequency, hematuria, or incontinence  NEUROLOGICAL: No headaches, memory loss, loss of strength, numbness, or tremors  ENDOCRINE: No heat or cold intolerance; No hair loss  MUSCULOSKELETAL: No joint pain or swelling; No muscle, back, or extremity pain      ICU Vital Signs Last 24 Hrs  T(C): 33 (20 Oct 2020 18:34), Max: 37 (19 Oct 2020 23:54)  T(F): 91.4 (20 Oct 2020 18:34), Max: 98.6 (19 Oct 2020 23:54)  HR: 67 (20 Oct 2020 18:34) (67 - 91)  BP: 85/61 (20 Oct 2020 18:34) (78/56 - 122/95)  BP(mean): 101 (20 Oct 2020 08:11) (101 - 101)  RR: 16 (20 Oct 2020 18:34) (14 - 18)  SpO2: 100% (20 Oct 2020 18:34) (100% - 100%)      ABG - ( 20 Oct 2020 18:47 )  pH, Arterial: 7.35  pH, Blood: x     /  pCO2: 21    /  pO2: 162   / HCO3: 11    / Base Excess: -13.0 /  SaO2: 96        I&O's Detail      PHYSICAL EXAM:    GENERAL: NAD, thin fragile women   HEAD:  Atraumatic, Normocephalic  EYES: EOMI, PERRLA, conjunctiva and sclera clear  ENMT: No tonsillar erythema, exudates, or enlargement; Moist mucous membranes, Good dentition, No lesions  NECK: Supple, No JVD, Normal thyroid  NERVOUS SYSTEM:  Alert & Oriented x2, very slow speech, no focal neurological deficits found  CHEST/LUNG: Clear to percussion bilaterally; No rales, rhonchi, wheezing, or rubs  HEART: Regular rate and rhythm; No murmurs, rubs, or gallops  ABDOMEN: Soft, Nontender, mildly distended, decreased bowel sounds heard  EXTREMITIES:  2+ Peripheral Pulses, trace pedal edema, RUE swelling  LYMPH: No lymphadenopathy noted  SKIN: No rashes or lesions    LABS:  CBC Full  -  ( 20 Oct 2020 18:17 )  WBC Count : 6.63 K/uL  RBC Count : 3.04 M/uL  Hemoglobin : 9.3 g/dL  Hematocrit : 28.5 %  Platelet Count - Automated : 108 K/uL  Mean Cell Volume : 93.8 fl  Mean Cell Hemoglobin : 30.6 pg  Mean Cell Hemoglobin Concentration : 32.6 gm/dL  Auto Neutrophil # : x  Auto Lymphocyte # : x  Auto Monocyte # : x  Auto Eosinophil # : x  Auto Basophil # : x  Auto Neutrophil % : x  Auto Lymphocyte % : x  Auto Monocyte % : x  Auto Eosinophil % : x  Auto Basophil % : x    10-20    145  |  123<H>  |  10  ----------------------------<  88  5.5<H>   |  13<L>  |  0.74    Ca    6.7<L>      20 Oct 2020 17:03  Phos  2.6     10-20  Mg     2.0     10-20    TPro  3.8<L>  /  Alb  1.5<L>  /  TBili  1.9<H>  /  DBili  x   /  AST  120<H>  /  ALT  63<H>  /  AlkPhos  105  10-20    CAPILLARY BLOOD GLUCOSE    EKG:    ECHO, US:    RADIOLOGY:< from: CT Abdomen and Pelvis w/ IV Cont (10.13.20 @ 18:50) >  Diffuse dilatation of small bowel loops without a clear transition is slightly increased, likely an ileus    Decreased moderate ascites.    1.5 cm pancreatic versus peripancreatic soft tissue nodule    < end of copied text >  < from: CT Head No Cont (10.16.20 @ 11:01) >  1)  unremarkable CT study of the brain  2)  moderate mucosal disease noted in the right maxillary sinus.    < end of copied text >      CRITICAL CARE TIME SPENT:

## 2020-10-20 NOTE — PROGRESS NOTE ADULT - PROBLEM SELECTOR PLAN 1
Afebrile, no leukocytosis   Source likely GI, pt is high risk for colonoscopy   Blood cultures 10/13 with bacteroids fragilis   Repeat blood cultures 10/16 negative   Continue cefepime and flagyl until 10/26  ID Dr. Patricio Afebrile, no leukocytosis   Source likely GI, pt is high risk for colonoscopy, can do virtual colonoscopy as outpatient   Blood cultures 10/13 with bacteroids fragilis   Repeat blood cultures 10/16 negative   Continue cefepime and flagyl until 10/26  May need extended dwell   ID Dr. Patricio

## 2020-10-21 NOTE — PROGRESS NOTE ADULT - SUBJECTIVE AND OBJECTIVE BOX
Patient is seen and examined at the bed side, is afebrile.  She has transferred to ICU since requiring pressor and did not response to IVF bolus. The CT abd/pelvis shows nonspecific enterocolitis, noninfectious inflammatory bowel disease, or ischemic bowel, along with ileus.         REVIEW OF SYSTEMS: All other review systems are negative        ALLERGIES: No Known Allergies        ICU Vital Signs Last 24 Hrs  T(C): 36.1 (21 Oct 2020 16:28), Max: 43 (21 Oct 2020 02:26)  T(F): 96.9 (21 Oct 2020 16:28), Max: 109.4 (21 Oct 2020 02:26)  HR: 99 (21 Oct 2020 18:30) (84 - 114)  BP: 102/69 (21 Oct 2020 18:30) (74/54 - 119/83)  BP(mean): 77 (21 Oct 2020 18:30) (49 - 92)  ABP: --  ABP(mean): --  RR: 23 (21 Oct 2020 18:30) (16 - 33)  SpO2: 99% (21 Oct 2020 18:30) (84% - 100%)        PHYSICAL EXAM:  GENERAL: Not in acute distress  CHEST/LUNG: Not using accessory muscles   HEART: s1 and s2 present  ABDOMEN:  Nontender and  Nondistended  EXTREMITIES: No pedal  edema  CNS: Awake and Alert         LABS:                        7.3    7.81  )-----------( 116      ( 21 Oct 2020 15:25 )             22.0                           8.8    5.59  )-----------( 120      ( 20 Oct 2020 07:08 )             26.6       10-21    147<H>  |  121<H>  |  10  ----------------------------<  97  4.0   |  11<L>  |  0.98    Ca    6.8<L>      21 Oct 2020 15:25  Phos  1.9     10-21  Mg     1.8     10-21    TPro  4.0<L>  /  Alb  2.1<L>  /  TBili  2.2<H>  /  DBili  x   /  AST  193<H>  /  ALT  79<H>  /  AlkPhos  111  10-21      10-20    145  |  123<H>  |  10  ----------------------------<  88  x    |  13<L>  |  0.74    Ca    6.7<L>      20 Oct 2020 17:03  Phos  2.6     10-20  Mg     2.0     10-20    TPro  3.8<L>  /  Alb  1.5<L>  /  TBili  1.9<H>  /  DBili  x   /  AST  120<H>  /  ALT  63<H>  /  AlkPhos  105  10-20    TPro  4.0<L>  /  Alb  1.8<L>  /  TBili  1.6<H>  /  DBili  x   /  AST  34  /  ALT  46  /  AlkPhos  90  10-17      Procalcitonin, Serum (10.15.20 @ 11:48)   Procalcitonin, Serum: 0.16: Procalcitonin (PCT) Interpretation (ng/mL) - Diagnosis of systemic   bacterial infection/sepsis       MEDICATIONS  (STANDING):    albumin human 25% IVPB 50 milliLiter(s) IV Intermittent every 6 hours  chlorhexidine 2% Cloths 1 Application(s) Topical daily  chlorhexidine 4% Liquid 1 Application(s) Topical <User Schedule>  hydrocortisone sodium succinate Injectable 50 milliGRAM(s) IV Push every 6 hours  lactated ringers. 1000 milliLiter(s) (75 mL/Hr) IV Continuous <Continuous>  meropenem  IVPB 1000 milliGRAM(s) IV Intermittent every 8 hours  midodrine. 10 milliGRAM(s) Oral three times a day  norepinephrine Infusion 0.05 MICROgram(s)/kG/Min (2.63 mL/Hr) IV Continuous <Continuous>  pantoprazole  Injectable 40 milliGRAM(s) IV Push two times a day        RADIOLOGY & ADDITIONAL TESTS:    10/21/20 : CT Abdomen and Pelvis w/ Oral Cont and w/ IV Cont (10.21.20 @ 15:59) Mural thickening of the left colon and rectum. Liquid stool in the colon. Apparent segmental mural thickening of the mid to distal small bowel and the proximal duodenum. Findings may represent nonspecific enterocolitis, noninfectious inflammatory bowel disease, or ischemic bowel. Clinical correlation is recommended. The celiac axis artery, SMA, and KINA are patent without stenosis.    Dilatation of the mid small bowel is again noted. Oral contrast has reached the terminal ileum. Findings may represent small bowel obstruction, or ileus related to nonspecific enterocolitis.   Cholelithiasis.    Moderate to large ascites in the abdomen, increased since the previous examination. 5 mm nonspecific noncalcified left upper lobe lung nodule; if the patient's is in the high risk category (i.e. smoker), follow-up chest CT may be pursued in 12 months to ensure stability.    Combination of atelectasis and consolidation in the left lower lobe.    Possible 1.0 cm hypodense lesion in the left lobe of the thyroid. Thyroid ultrasound may be pursued for further evaluation.   Mild bilateral pleural effusions.    Aging determinate compression fracture at T5 vertebra.          10/13/20 : CT Abdomen and Pelvis w/ IV Cont (10.13.20 @ 18:50) >    Diffuse dilatation of small bowel loops without a clear transition is slightly increased, likely an ileus.  Decreased moderate ascites.    1.5 cm pancreatic versus peripancreatic soft tissue nodule    10/13/20 : Xray Chest 1 View- PORTABLE-Urgent (Xray Chest 1 View- PORTABLE-Urgent .) (10.13.20 @ 16:34) Clear lungs.          MICROBIOLOGY DATA:    Culture - Blood in AM (10.16.20 @ 10:13)   Specimen Source: .Blood Blood-Peripheral   Culture Results:   No growth to date.     Culture - Blood in AM (10.16.20 @ 10:13)   Specimen Source: .Blood Blood-Peripheral   Culture Results:   No growth to date.     Culture - Blood (10.13.20 @ 22:23)   Growth in anaerobic bottle: Gram Negative Rods   Specimen Source: .Blood Blood-Peripheral   Organism: Blood Culture PCR   Culture Results:   Growth in anaerobic bottle: Bacteroides fragilis   "Susceptibilities not performed"   ***Blood Panel PCR results on this specimen are available   approximately 3 hours after the Gram stain result.***   Gram stain, PCR, and/or culture results may not always   correspond due to difference in methodologies.       Culture - Blood (10.13.20 @ 22:23)   Specimen Source: .Blood Blood-Peripheral   Culture Results:   No growth to date.     Urine Microscopic-Add On (NC) (10.13.20 @ 21:39)   Bacteria: Moderate /HPF   Epithelial Cells: Moderate /HPF   Red Blood Cell - Urine: 5-10 /HPF   White Blood Cell - Urine: 6-10 /HPF

## 2020-10-21 NOTE — CHART NOTE - NSCHARTNOTEFT_GEN_A_CORE
Assessment:   Patient is a 65y old  Female who presents with a chief complaint of Hypotension (21 Oct 2020 12:34). Pt transferred to ICU this day. Pt seen, discussed with RN. Pt NPO for test (CAT scan). Noted with abdominal distension: ileus vs SBO.      Factors impacting intake: [ ] none [ ] nausea  [ ] vomiting [ ] diarrhea [ ] constipation  [ ]chewing problems [ ] swallowing issues  [x ] other: critically ill, NPO, identified (on floor) with PCM (severe)    Diet Prescription: Diet, NPO (10-21-20 @ 09:33)    Intake: NPO    Daily Height in cm: 167.64 (21 Oct 2020 02:26)    Daily Weight in k.2 (21 Oct 2020 02:26)  Weight in k.2 (13 Oct 2020 22:25)    % Weight Change: 3+ generalized edema    Pertinent Medications: MEDICATIONS  (STANDING):  albumin human 25% IVPB 50 milliLiter(s) IV Intermittent every 6 hours  chlorhexidine 2% Cloths 1 Application(s) Topical daily  chlorhexidine 4% Liquid 1 Application(s) Topical <User Schedule>  hydrocortisone sodium succinate Injectable 50 milliGRAM(s) IV Push every 6 hours  lactated ringers. 1000 milliLiter(s) (75 mL/Hr) IV Continuous <Continuous>  meropenem  IVPB 1000 milliGRAM(s) IV Intermittent every 8 hours  metroNIDAZOLE  IVPB 500 milliGRAM(s) IV Intermittent every 8 hours  metroNIDAZOLE  IVPB      midodrine. 10 milliGRAM(s) Oral three times a day  norepinephrine Infusion 0.05 MICROgram(s)/kG/Min (2.63 mL/Hr) IV Continuous <Continuous>  pantoprazole  Injectable 40 milliGRAM(s) IV Push two times a day  phenylephrine    Infusion 0.2 MICROgram(s)/kG/Min (2.28 mL/Hr) IV Continuous <Continuous>  potassium phosphate IVPB 15 milliMole(s) IV Intermittent once    MEDICATIONS  (PRN):  sodium chloride 0.9% lock flush 10 milliLiter(s) IV Push every 1 hour PRN Pre/post blood products, medications, blood draw, and to maintain line patency    Pertinent Labs: 10- Na148 mmol/L<H> Glu 86 mg/dL K+ 3.8 mmol/L Cr  1.01 mg/dL BUN 10 mg/dL 10- Phos 1.9 mg/dL<L> 10-21 Alb 1.5 g/dL<L> 10-14 Chol 67 mg/dL LDL 41 mg/dL HDL 5 mg/dL<L> Trig 106 mg/dL     CAPILLARY BLOOD GLUCOSE          Previous Nutrition Diagnosis:   [ ] Altered GI function  [ ]Inadequate Oral Intake [ ] Swallowing Difficulty   [ ] Altered nutrition related labs [ ] Increased Nutrient Needs [ ] Overweight/Obesity   [ ] Unintended Weight Loss [ ] Food & Nutrition Related Knowledge Deficit [x ] Malnutrition  (severe PCM)  [ ] Other:     Nutrition Diagnosis is [x ] ongoing  [ ] resolved [ ] not applicable           Interventions:   Recommend  [ ] Change Diet To:  [ ] Nutrition Supplement  [x ] Nutrition Support: Consider alternate route if NPO prolonged or expected  [x ] Other: Diet advancement per MD. MD to monitor. RD available.     Monitoring and Evaluation:    [ x ] Tolerance to diet prescription [ x ] weights [ x ] labs[ x ] follow up per protocol  [ ] other:

## 2020-10-21 NOTE — PROGRESS NOTE ADULT - ASSESSMENT
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  BC grew bacteroides.    Problem/Recommendation - 1:  Problem: Bacteremia. Recommendation: bacteroides  most likely GI origin  on antibiotics  he has hypothermia and hypotension and elevated lactic acid  in ICU now    Problem/Recommendation - 2:  ·  Problem: Anemia.  Recommendation: ferritin, b12 folate normal  no hemolysis  most likley due to malnutrition from short bowel syndrome.     Problem/Recommendation - 3:  ·  Problem: Acute deep vein thrombosis (DVT) of other vein of upper extremity.  Recommendation: she has been on xarelto for 2 years.  DVT at left arm and left leg before.  will continue xarelto.

## 2020-10-21 NOTE — PROGRESS NOTE ADULT - ASSESSMENT
65y Female from home, lives with daughter, ambulates with walker with PMH of recurrent SBO's, s/p exp lap, SB resection in 2015, ex lap, ALBINA in 2018, DVT, PE, on Xarelto, IVC filter, chronic leg swelling, and anasarca, came in to the ED due to hypotension during office visit. Patient was found to be bacteremic with bacteroids fragilis. She was being treated with cefepime and flagyl. She was found to be hypothermic today and ICU was consulted for evaluation. ICU is deferring admission for now and will reevaluate the need for pressors.    Diagnosis:  >Sepsis  >Bacteremia  >Anemia  >DVT/PE  >Encephalopathy  >Transaminitis    Neuro:  -Patient presented with encephalopathy on admission. Patient was back to baseline after treatment with fluids and antibiotics  -She is AAOx2 at baseline on evaluation    CVS:  Patient is hypotensive and hypothermic secondary to sepsis.  Will continue with cefepime and flagyl  Will give LR bolus, also will start on standing fluids  Will also start on albumin, given albumin level of 1.6  Will start on midodrine to help improve BP.  If Bp still does not improve, ICU will reevaluate  Started on warm blanket on floor    Respiratory:  No issues    Gastrointestinal:  Patient is bacteremic with bacteroids fragilis, likely secondary from intra abdominal source.  Patient had multiple SBO in past. Ct abdomen on admission showed ileus which is resolved  GI, DR Chatterjee deferred doing colonoscopy due to multiple comorbidities.  OE Belly is mildly distended. Last BM was this morning  LFTs are mildly elevated for past two days. Likely due to sepsis.  C/w Flagyl, cefepime  f/u CXR  f/u repeat culture  If patient develops signs of bowel obstruction, consider repeating CT scan or surgical consult    ID:  Patient is bacteremic with bacteroids likely Gi source.  Patient is still septic with lactate of 4.2 and hypotensive and hypothermic  Will give Iv hydration, also on standing fluids  c/w flagyl and cefepime   Will repeat culture  f/u repeat lactate at midnight.  Monitor vitals Q4      Nephro:  Patient has hyperchloremic acidosis with cl of 127, bicarb of 13  Likely secondary to sepsis and IV hydration with NS  Switched fluids to LR  pH is wnl.  Monitor electrolytes and ph.  Monitor respiratory status    Hem Onc:  Has h/o DVt and PE in past. Patient is on xarelto  Patient has anemia secondary to iron def, in setting of malnutrition from short bowel syndrome  Started on iron supplements    Endo:  No issues    GOC:  Full code       65y Female from home, lives with daughter, ambulates with walker with PMH of recurrent SBO's, s/p exp lap, SB resection in 2015, ex lap, ALBINA in 2018, DVT, PE, on Xarelto, IVC filter, chronic leg swelling, and anasarca, came in to the ED due to hypotension during office visit. Patient was found to be bacteremic with bacteroids fragilis. She was being treated with cefepime and flagyl. She was found to be hypothermic today and ICU was consulted for evaluation. ICU is deferring admission for now and will reevaluate the need for pressors.    Diagnosis:  >Sepsis  >Bacteremia  >Anemia  >DVT/PE  >Encephalopathy  >Transaminitis    Neuro:  -Patient presented with encephalopathy on admission. Patient was back to baseline after treatment with fluids and antibiotics  -She is AAOx2 at baseline on evaluation    CVS:  -Patient is hypotensive and hypothermic secondary to sepsis.  -c/w with cefepime and flagyl  -s/p LR bolus, on standing LR  - started on albumin, given albumin level of 1.6  -Midodrine 10 mg TID  -Carl hagger for hypothermia    Respiratory:  No issues    Gastrointestinal:  -Patient is bacteremic with bacteroids fragilis, likely secondary from intra abdominal source.  -Patient had multiple SBO in past. Ct abdomen on admission showed ileus which is resolved  -GI, DR Chatterjee deferred doing colonoscopy due to multiple comorbidities.  -OE Belly is mildly distended. Last BM was yesterday morning  -LFTs are mildly elevated for past two days. Likely due to sepsis.  -C/w Flagyl, cefepime  - Abdominal Xray showed distended colon  -f/u CT abdomen and pelvis with oral and IV contrast  -NPO for now even for medications  -Hold AC  -repeat blood culture on 10/16 -ve  -f/u repeat blood culture    ID:  -Patient is bacteremic with bacteroids likely Gi source.  -Patient is still septic with lactate of 7.1 and hypotensive and hypothermic  - Iv hydration,  on standing fluids LR  -c/w flagyl and cefepime   -Will repeat culture  -f/u cbc, cmp and lactate  -Monitor vitals Q4      Nephro:  -Patient has hyperchloremic acidosis with cl of 127, bicarb of 13  -Likely secondary to sepsis and IV hydration with NS  -Switched fluids to LR  -pH is wnl.  -Monitor electrolytes and ph.  -Monitor respiratory status    Hem Onc:  -Has h/o DVt and PE in past. Patient is on xarelto  -Patient has anemia secondary to iron def, in setting of malnutrition from short bowel syndrome  -Started on iron supplements  -will hold all oral medications until Cath scan of abdomen and pelvis have been done    Endo:  No issues    GOC:  Full code

## 2020-10-21 NOTE — PROGRESS NOTE ADULT - SUBJECTIVE AND OBJECTIVE BOX
INTERVAL HPI/OVERNIGHT EVENTS:    PRESSORS: [ ] YES [ ] NO  WHICH:    ANTIBIOTICS:                      Antimicrobial:  meropenem  IVPB 1000 milliGRAM(s) IV Intermittent every 8 hours  metroNIDAZOLE  IVPB 500 milliGRAM(s) IV Intermittent every 8 hours  metroNIDAZOLE  IVPB        Cardiovascular:  midodrine. 10 milliGRAM(s) Oral three times a day  norepinephrine Infusion 0.05 MICROgram(s)/kG/Min IV Continuous <Continuous>  phenylephrine    Infusion 0.2 MICROgram(s)/kG/Min IV Continuous <Continuous>    Pulmonary:    Hematalogic:    Other:  albumin human 25% IVPB 50 milliLiter(s) IV Intermittent every 6 hours  chlorhexidine 2% Cloths 1 Application(s) Topical daily  chlorhexidine 4% Liquid 1 Application(s) Topical <User Schedule>  hydrocortisone sodium succinate Injectable 50 milliGRAM(s) IV Push every 6 hours  lactated ringers. 1000 milliLiter(s) IV Continuous <Continuous>  magnesium sulfate  IVPB 1 Gram(s) IV Intermittent once  pantoprazole  Injectable 40 milliGRAM(s) IV Push two times a day  potassium phosphate IVPB 15 milliMole(s) IV Intermittent once  senna 2 Tablet(s) Oral at bedtime  sodium chloride 0.9% lock flush 10 milliLiter(s) IV Push every 1 hour PRN    albumin human 25% IVPB 50 milliLiter(s) IV Intermittent every 6 hours  chlorhexidine 2% Cloths 1 Application(s) Topical daily  chlorhexidine 4% Liquid 1 Application(s) Topical <User Schedule>  hydrocortisone sodium succinate Injectable 50 milliGRAM(s) IV Push every 6 hours  lactated ringers. 1000 milliLiter(s) IV Continuous <Continuous>  magnesium sulfate  IVPB 1 Gram(s) IV Intermittent once  meropenem  IVPB 1000 milliGRAM(s) IV Intermittent every 8 hours  metroNIDAZOLE  IVPB 500 milliGRAM(s) IV Intermittent every 8 hours  metroNIDAZOLE  IVPB      midodrine. 10 milliGRAM(s) Oral three times a day  norepinephrine Infusion 0.05 MICROgram(s)/kG/Min IV Continuous <Continuous>  pantoprazole  Injectable 40 milliGRAM(s) IV Push two times a day  phenylephrine    Infusion 0.2 MICROgram(s)/kG/Min IV Continuous <Continuous>  potassium phosphate IVPB 15 milliMole(s) IV Intermittent once  senna 2 Tablet(s) Oral at bedtime  sodium chloride 0.9% lock flush 10 milliLiter(s) IV Push every 1 hour PRN    Drug Dosing Weight  Height (cm): 167.6 (21 Oct 2020 02:26)  Weight (kg): 56.2 (21 Oct 2020 02:26)  BMI (kg/m2): 20 (21 Oct 2020 02:26)  BSA (m2): 1.63 (21 Oct 2020 02:26)    CENTRAL LINE: [ ] YES [ ] NO  LOCATION:   DATE INSERTED:  REMOVE: [ ] YES [ ] NO  EXPLAIN:    TREJO: [ ] YES [ ] NO    DATE INSERTED:  REMOVE:  [ ] YES [ ] NO  EXPLAIN:    A-LINE:  [ ] YES [ ] NO  LOCATION:   DATE INSERTED:  REMOVE:  [ ] YES [ ] NO  EXPLAIN:    PMH -reviewed admission note, no change since admission    ICU Vital Signs Last 24 Hrs  T(C): 35.8 (21 Oct 2020 10:45), Max: 43 (21 Oct 2020 02:26)  T(F): 96.5 (21 Oct 2020 10:45), Max: 109.4 (21 Oct 2020 02:26)  HR: 93 (21 Oct 2020 11:45) (67 - 102)  BP: 97/64 (21 Oct 2020 11:45) (75/53 - 118/81)  BP(mean): 73 (21 Oct 2020 11:45) (49 - 90)  ABP: --  ABP(mean): --  RR: 23 (21 Oct 2020 11:45) (16 - 33)  SpO2: 100% (21 Oct 2020 11:45) (84% - 100%)      ABG - ( 20 Oct 2020 18:47 )  pH, Arterial: 7.35  pH, Blood: x     /  pCO2: 21    /  pO2: 162   / HCO3: 11    / Base Excess: -13.0 /  SaO2: 96                    10-20 @ 07:01  -  10-21 @ 07:00  --------------------------------------------------------  IN: 599.9 mL / OUT: 0 mL / NET: 599.9 mL            PHYSICAL EXAM:    GENERAL: NAD, well-groomed, well-developed  HEAD:  Atraumatic, Normocephalic  EYES: EOMI, PERRLA, conjunctiva and sclera clear  ENMT: No tonsillar erythema, exudates, or enlargement; Moist mucous membranes, Good dentition, No lesions  NECK: Supple, normal appearance, No JVD; Normal thyroid; Trachea midline  NERVOUS SYSTEM:  Alert & Oriented X3, Good concentration; Motor Strength 5/5 B/L upper and lower extremities; DTRs 2+ intact and symmetric  CHEST/LUNG: No chest deformity; Normal percussion bilaterally; No rales, rhonchi, wheezing   HEART: Regular rate and rhythm; No murmurs, rubs, or gallops  ABDOMEN: Soft, Nontender, Nondistended; Bowel sounds present  EXTREMITIES:  2+ Peripheral Pulses, No clubbing, cyanosis, or edema  LYMPH: No lymphadenopathy noted  SKIN: No rashes or lesions; Good capillary refill      LABS:  CBC Full  -  ( 21 Oct 2020 08:02 )  WBC Count : 7.44 K/uL  RBC Count : 2.72 M/uL  Hemoglobin : 8.6 g/dL  Hematocrit : 25.6 %  Platelet Count - Automated : 143 K/uL  Mean Cell Volume : 94.1 fl  Mean Cell Hemoglobin : 31.6 pg  Mean Cell Hemoglobin Concentration : 33.6 gm/dL  Auto Neutrophil # : x  Auto Lymphocyte # : x  Auto Monocyte # : x  Auto Eosinophil # : x  Auto Basophil # : x  Auto Neutrophil % : x  Auto Lymphocyte % : x  Auto Monocyte % : x  Auto Eosinophil % : x  Auto Basophil % : x    10-21    148<H>  |  122<H>  |  10  ----------------------------<  86  3.8   |  12<L>  |  1.01    Ca    7.3<L>      21 Oct 2020 08:02  Phos  1.9     10-21  Mg     1.8     10-21    TPro  3.6<L>  /  Alb  1.5<L>  /  TBili  1.7<H>  /  DBili  x   /  AST  175<H>  /  ALT  79<H>  /  AlkPhos  107  10-21            RADIOLOGY & ADDITIONAL STUDIES REVIEWED:  ***    GOALS OF CARE DISCUSSION WITH PATIENT/FAMILY/PROXY:    CRITICAL CARE TIME SPENT: 35 minutes INTERVAL HPI/OVERNIGHT EVENTS: Pt admitted in ICU for hypotension and hypothermia. Pt with multiple PMH of SBO and resection, assessed at bedside this morning, AAOX2 (baseline). Has RIJ on Phenylephrine 2.4mcg/kg/h. Carl Hagger to prevent hypothermia.     PRESSORS: [X ] YES [ ] NO  WHICH: phenylephrine 2.4 mcg/kg/h  Central Line: RIJ  Carl Hagger 38C    ANTIBIOTICS:                      Antimicrobial:  meropenem  IVPB 1000 milliGRAM(s) IV Intermittent every 8 hours  metroNIDAZOLE  IVPB 500 milliGRAM(s) IV Intermittent every 8 hours  metroNIDAZOLE  IVPB        Cardiovascular:  midodrine. 10 milliGRAM(s) Oral three times a day  norepinephrine Infusion 0.05 MICROgram(s)/kG/Min IV Continuous <Continuous>  phenylephrine    Infusion 0.2 MICROgram(s)/kG/Min IV Continuous <Continuous>    Pulmonary:    Hematalogic:    Other:  albumin human 25% IVPB 50 milliLiter(s) IV Intermittent every 6 hours  chlorhexidine 2% Cloths 1 Application(s) Topical daily  chlorhexidine 4% Liquid 1 Application(s) Topical <User Schedule>  hydrocortisone sodium succinate Injectable 50 milliGRAM(s) IV Push every 6 hours  lactated ringers. 1000 milliLiter(s) IV Continuous <Continuous>  magnesium sulfate  IVPB 1 Gram(s) IV Intermittent once  pantoprazole  Injectable 40 milliGRAM(s) IV Push two times a day  potassium phosphate IVPB 15 milliMole(s) IV Intermittent once  senna 2 Tablet(s) Oral at bedtime  sodium chloride 0.9% lock flush 10 milliLiter(s) IV Push every 1 hour PRN    albumin human 25% IVPB 50 milliLiter(s) IV Intermittent every 6 hours  chlorhexidine 2% Cloths 1 Application(s) Topical daily  chlorhexidine 4% Liquid 1 Application(s) Topical <User Schedule>  hydrocortisone sodium succinate Injectable 50 milliGRAM(s) IV Push every 6 hours  lactated ringers. 1000 milliLiter(s) IV Continuous <Continuous>  magnesium sulfate  IVPB 1 Gram(s) IV Intermittent once  meropenem  IVPB 1000 milliGRAM(s) IV Intermittent every 8 hours  metroNIDAZOLE  IVPB 500 milliGRAM(s) IV Intermittent every 8 hours  metroNIDAZOLE  IVPB      midodrine. 10 milliGRAM(s) Oral three times a day  norepinephrine Infusion 0.05 MICROgram(s)/kG/Min IV Continuous <Continuous>  pantoprazole  Injectable 40 milliGRAM(s) IV Push two times a day  phenylephrine    Infusion 0.2 MICROgram(s)/kG/Min IV Continuous <Continuous>  potassium phosphate IVPB 15 milliMole(s) IV Intermittent once  senna 2 Tablet(s) Oral at bedtime  sodium chloride 0.9% lock flush 10 milliLiter(s) IV Push every 1 hour PRN    Drug Dosing Weight  Height (cm): 167.6 (21 Oct 2020 02:26)  Weight (kg): 56.2 (21 Oct 2020 02:26)  BMI (kg/m2): 20 (21 Oct 2020 02:26)  BSA (m2): 1.63 (21 Oct 2020 02:26)    CENTRAL LINE: [ ] YES [ ] NO  LOCATION:   DATE INSERTED:  REMOVE: [ ] YES [ ] NO  EXPLAIN:    TREJO: [ ] YES [ ] NO    DATE INSERTED:  REMOVE:  [ ] YES [ ] NO  EXPLAIN:    A-LINE:  [ ] YES [ ] NO  LOCATION:   DATE INSERTED:  REMOVE:  [ ] YES [ ] NO  EXPLAIN:    PMH -reviewed admission note, no change since admission    ICU Vital Signs Last 24 Hrs  T(C): 35.8 (21 Oct 2020 10:45), Max: 43 (21 Oct 2020 02:26)  T(F): 96.5 (21 Oct 2020 10:45), Max: 109.4 (21 Oct 2020 02:26)  HR: 93 (21 Oct 2020 11:45) (67 - 102)  BP: 97/64 (21 Oct 2020 11:45) (75/53 - 118/81)  BP(mean): 73 (21 Oct 2020 11:45) (49 - 90)  ABP: --  ABP(mean): --  RR: 23 (21 Oct 2020 11:45) (16 - 33)  SpO2: 100% (21 Oct 2020 11:45) (84% - 100%)      ABG - ( 20 Oct 2020 18:47 )  pH, Arterial: 7.35  pH, Blood: x     /  pCO2: 21    /  pO2: 162   / HCO3: 11    / Base Excess: -13.0 /  SaO2: 96                    10-20 @ 07:01  -  10-21 @ 07:00  --------------------------------------------------------  IN: 599.9 mL / OUT: 0 mL / NET: 599.9 mL            PHYSICAL EXAM:    GENERAL: NAD, well-groomed, well-developed  HEAD:  Atraumatic, Normocephalic  EYES: EOMI, PERRLA, conjunctiva and sclera clear  ENMT: No tonsillar erythema, exudates, or enlargement; Moist mucous membranes, Good dentition, No lesions  NECK: Supple, normal appearance, No JVD; Normal thyroid; Trachea midline  NERVOUS SYSTEM:  Alert & Oriented X3, Good concentration; Motor Strength 5/5 B/L upper and lower extremities; DTRs 2+ intact and symmetric  CHEST/LUNG: No chest deformity; Normal percussion bilaterally; No rales, rhonchi, wheezing   HEART: Regular rate and rhythm; No murmurs, rubs, or gallops  ABDOMEN: Soft, Nontender, Nondistended; Bowel sounds present  EXTREMITIES:  2+ Peripheral Pulses, No clubbing, cyanosis, or edema  LYMPH: No lymphadenopathy noted  SKIN: No rashes or lesions; Good capillary refill      LABS:  CBC Full  -  ( 21 Oct 2020 08:02 )  WBC Count : 7.44 K/uL  RBC Count : 2.72 M/uL  Hemoglobin : 8.6 g/dL  Hematocrit : 25.6 %  Platelet Count - Automated : 143 K/uL  Mean Cell Volume : 94.1 fl  Mean Cell Hemoglobin : 31.6 pg  Mean Cell Hemoglobin Concentration : 33.6 gm/dL  Auto Neutrophil # : x  Auto Lymphocyte # : x  Auto Monocyte # : x  Auto Eosinophil # : x  Auto Basophil # : x  Auto Neutrophil % : x  Auto Lymphocyte % : x  Auto Monocyte % : x  Auto Eosinophil % : x  Auto Basophil % : x    10-21    148<H>  |  122<H>  |  10  ----------------------------<  86  3.8   |  12<L>  |  1.01    Ca    7.3<L>      21 Oct 2020 08:02  Phos  1.9     10-21  Mg     1.8     10-21    TPro  3.6<L>  /  Alb  1.5<L>  /  TBili  1.7<H>  /  DBili  x   /  AST  175<H>  /  ALT  79<H>  /  AlkPhos  107  10-21            RADIOLOGY & ADDITIONAL STUDIES REVIEWED:    Right central line tip over the right atrium. No pneumothorax although the patient's chin partially obscures the right lung apex.  Right basilar discoid atelectasis. Left pleural effusion. Cannot exclude underlying infiltrate or atelectasis.  Distended colon under the right hemidiaphragm.    Distended colon, consistent with ileus or early or partial obstruction. A CT scan of the abdomen and pelvis has been ordered.        GOALS OF CARE DISCUSSION WITH PATIENT/FAMILY/PROXY: DNR    CRITICAL CARE TIME SPENT: 35 minutes

## 2020-10-21 NOTE — CHART NOTE - NSCHARTNOTEFT_GEN_A_CORE
EVENT: Daughter Jus Bucio [phone] called and updated on change in pt's status, upgrade to ICU. Pt is informed, questions answered, emotional support given.    Vital Signs Last 24 Hrs  T(C): 36.8 (20 Oct 2020 23:10), Max: 36.8 (20 Oct 2020 23:10)  T(F): 98.2 (20 Oct 2020 23:10), Max: 98.2 (20 Oct 2020 23:10)  HR: 97 (20 Oct 2020 23:10) (67 - 97)  BP: 75/53 (20 Oct 2020 23:10) (75/53 - 122/95)  BP(mean): 101 (20 Oct 2020 08:11) (101 - 101)  RR: 18 (20 Oct 2020 23:10) (14 - 18)  SpO2: 98% (20 Oct 2020 23:10) (98% - 100%)    LAB:  Lactate, Blood: 5.2 mmol/L (10/21/20 @ 01:25)  Lactate, Blood: 4.2 mmol/L (10.20.20 @ 18:17)                          8.3    4.77  )-----------( 108      ( 21 Oct 2020 01:30 )             24.8     10-21    145  |  121<H>  |  11  ----------------------------<  84  3.9   |  14<L>  |  0.86    Ca    7.1<L>      21 Oct 2020 01:30  Phos  2.6     10-20  Mg     2.0     10-20    TPro  3.6<L>  /  Alb  1.5<L>  /  TBili  1.7<H>  /  DBili  x   /  AST  175<H>  /  ALT  79<H>  /  AlkPhos  107  10-21    Respiratory Viral Panel + COVID-19 by LUIS MANUEL (10.13.20 @ 16:00)  Rapid RVP Result: [facility]  SARS-CoV-2: [facility]: This Respiratory Panel uses polymerase chain reaction (PCR) to detect for  adenovirus; coronavirus (HKU1, NL63, 229E, OC43); human metapneumovirus  (hMPV); human enterovirus/rhinovirus (Entero/RV); influenza A; influenza  A/H1; influenza A/H3; influenza A/H1-2009; influenza B; parainfluenza  viruses 1, 2, 3, 4; respiratory syncytial virus; Mycoplasma pneumoniae;  Chlamydophila pneumoniae; and SARS-CoV-2.

## 2020-10-21 NOTE — PROGRESS NOTE ADULT - ASSESSMENT
Patient is a 65y old  Female from home, lives with daughter, ambulates with walker with PMH of recurrent SBO's, s/p exp lap, SB resection in 2015, ex lap, ALBINA in 2018, DVT, PE, on Xarelto, IVC filter, chronic leg swelling, and anasarca, Now send in to the ER by her PCP, Dr. Ross, for evaluation of  generalized weakness and hypotension BP of 80/40 during office visit. On admission, she found to have no fever and BP was in lower normal range and no Leukocytosis. The CXR is clear and CT abd/pelvis consistent with Ileus. The ID consult requested to assist with evaluation of infectious etiology of episode of hypotension. Found to have Bacteroides bacteremia and now developed septic shock on Cefepime and Flagyl. Hence transferred to ICU. 10/20/20.    # Hypotension  at office- BP of 80/40 - resolved   #  Bacteroides fragilis Bacteremia - 10/13/20 - ? source most likely GI- Repeat Blood Cxs have NGTD 10/16/20  # Ileus  - h/o recurrent SBO and s/p EXp. LAp  # COVID 19 negative   # Septic shock ( Hypothermia + hypotensive)- transferred to ICU since requiring pressor- source GI, The CT abd/pelvis shows nonspecific enterocolitis, noninfectious inflammatory bowel disease, or ischemic bowel, along with ileus.       would recommend:    1. Follow up Blood cultures from 10/20  2. Please send stool for C.diff if developed diarrhea  3. Follow up surgery recommendation regarding ischemic bowel   4. Continue Meropenem and discontinue Flagyl   5. Monitor kidney function and adjust Abx doses accordingly   6. Management of Ileus as per ICU protocol    d/w ICU team      Attending Attestation:    Spent more than 45 minutes on total encounter, more than 50 % of the visit was spent counseling and/or coordinating care by the Attending physician.

## 2020-10-21 NOTE — PROGRESS NOTE ADULT - SUBJECTIVE AND OBJECTIVE BOX
hypotensive and hypothermia  increased Lactic acid  now in ICU    ROS:  Negative except for:  MEDICATIONS  (STANDING):  albumin human 25% IVPB 50 milliLiter(s) IV Intermittent every 6 hours  chlorhexidine 2% Cloths 1 Application(s) Topical daily  chlorhexidine 4% Liquid 1 Application(s) Topical <User Schedule>  hydrocortisone sodium succinate Injectable 50 milliGRAM(s) IV Push every 6 hours  iohexol 300 mG (iodine)/mL Oral Solution 30 milliLiter(s) Oral once  lactated ringers. 1000 milliLiter(s) (75 mL/Hr) IV Continuous <Continuous>  magnesium sulfate  IVPB 1 Gram(s) IV Intermittent once  meropenem  IVPB 1000 milliGRAM(s) IV Intermittent every 8 hours  metroNIDAZOLE  IVPB 500 milliGRAM(s) IV Intermittent every 8 hours  metroNIDAZOLE  IVPB      midodrine. 10 milliGRAM(s) Oral three times a day  norepinephrine Infusion 0.05 MICROgram(s)/kG/Min (2.63 mL/Hr) IV Continuous <Continuous>  pantoprazole  Injectable 40 milliGRAM(s) IV Push two times a day  phenylephrine    Infusion 0.2 MICROgram(s)/kG/Min (2.28 mL/Hr) IV Continuous <Continuous>  potassium phosphate IVPB 15 milliMole(s) IV Intermittent once  senna 2 Tablet(s) Oral at bedtime    MEDICATIONS  (PRN):  sodium chloride 0.9% lock flush 10 milliLiter(s) IV Push every 1 hour PRN Pre/post blood products, medications, blood draw, and to maintain line patency      Allergies    No Known Allergies    Intolerances        Vital Signs Last 24 Hrs  T(C): 35.6 (21 Oct 2020 05:41), Max: 43 (21 Oct 2020 02:26)  T(F): 96 (21 Oct 2020 05:41), Max: 109.4 (21 Oct 2020 02:26)  HR: 100 (21 Oct 2020 07:00) (67 - 102)  BP: 102/68 (21 Oct 2020 07:00) (75/53 - 105/72)  BP(mean): 76 (21 Oct 2020 07:00) (57 - 80)  RR: 28 (21 Oct 2020 07:00) (16 - 33)  SpO2: 100% (21 Oct 2020 07:00) (84% - 100%)    PHYSICAL EXAM  General: adult in NAD  HEENT: clear oropharynx, anicteric sclera, pink conjunctiva  Neck: supple  CV: normal S1/S2 with no murmur rubs or gallops  Lungs: positive air movement b/l ant lungs,clear to auscultation, no wheezes, no rales  Abdomen: soft non-tender non-distended, no hepatosplenomegaly  Ext: no clubbing cyanosis or edema  Skin: no rashes and no petechiae  Neuro: alert and oriented X 4, no focal deficits  LABS:                          8.6    7.44  )-----------( 143      ( 21 Oct 2020 08:02 )             25.6         Mean Cell Volume : 94.1 fl  Mean Cell Hemoglobin : 31.6 pg  Mean Cell Hemoglobin Concentration : 33.6 gm/dL  Auto Neutrophil # : x  Auto Lymphocyte # : x  Auto Monocyte # : x  Auto Eosinophil # : x  Auto Basophil # : x  Auto Neutrophil % : x  Auto Lymphocyte % : x  Auto Monocyte % : x  Auto Eosinophil % : x  Auto Basophil % : x    Serial CBC  Hematocrit 25.6  Hemoglobin 8.6  Plat 143  RBC 2.72  WBC 7.44  Serial CBC  Hematocrit 24.8  Hemoglobin 8.3  Plat 108  RBC 2.63  WBC 4.77  Serial CBC  Hematocrit 28.5  Hemoglobin 9.3  Plat 108  RBC 3.04  WBC 6.63  Serial CBC  Hematocrit 26.6  Hemoglobin 8.8  Plat 120  RBC 2.85  WBC 5.59  Serial CBC  Hematocrit 25.0  Hemoglobin 8.5  Plat 119  RBC 2.71  WBC 6.15  Serial CBC  Hematocrit 25.9  Hemoglobin 8.9  Plat 150  RBC 2.84  WBC 3.95    10-21    148<H>  |  122<H>  |  10  ----------------------------<  86  3.8   |  12<L>  |  1.01    Ca    7.3<L>      21 Oct 2020 08:02  Phos  1.9     10-21  Mg     1.8     10-21    TPro  3.6<L>  /  Alb  1.5<L>  /  TBili  1.7<H>  /  DBili  x   /  AST  175<H>  /  ALT  79<H>  /  AlkPhos  107  10-21                    BLOOD SMEAR INTERPRETATION:       RADIOLOGY & ADDITIONAL STUDIES:

## 2020-10-22 NOTE — PROGRESS NOTE ADULT - SUBJECTIVE AND OBJECTIVE BOX
lethargic  still hypothermic  on steroid and pressor    MEDICATIONS  (STANDING):  albumin human 25% IVPB 50 milliLiter(s) IV Intermittent every 6 hours  chlorhexidine 2% Cloths 1 Application(s) Topical daily  chlorhexidine 4% Liquid 1 Application(s) Topical <User Schedule>  hydrocortisone sodium succinate Injectable 50 milliGRAM(s) IV Push every 6 hours  lactated ringers. 1000 milliLiter(s) (75 mL/Hr) IV Continuous <Continuous>  meropenem  IVPB 1000 milliGRAM(s) IV Intermittent every 8 hours  midodrine. 10 milliGRAM(s) Oral three times a day  norepinephrine Infusion 0.05 MICROgram(s)/kG/Min (2.63 mL/Hr) IV Continuous <Continuous>  pantoprazole  Injectable 40 milliGRAM(s) IV Push two times a day    MEDICATIONS  (PRN):  sodium chloride 0.9% lock flush 10 milliLiter(s) IV Push every 1 hour PRN Pre/post blood products, medications, blood draw, and to maintain line patency      Allergies    No Known Allergies    Intolerances        Vital Signs Last 24 Hrs  T(C): 35.5 (22 Oct 2020 09:15), Max: 36.1 (21 Oct 2020 16:28)  T(F): 95.9 (22 Oct 2020 09:15), Max: 96.9 (21 Oct 2020 16:28)  HR: 105 (22 Oct 2020 09:15) (86 - 114)  BP: 94/64 (22 Oct 2020 09:15) (74/51 - 124/84)  BP(mean): 71 (22 Oct 2020 09:15) (56 - 93)  RR: 29 (22 Oct 2020 09:15) (18 - 33)  SpO2: 98% (22 Oct 2020 09:15) (92% - 100%)    PHYSICAL EXAM  General: adult in NAD  HEENT: clear oropharynx, anicteric sclera, pink conjunctiva  Neck: supple  CV: normal S1/S2 with no murmur rubs or gallops  Lungs: positive air movement b/l ant lungs,clear to auscultation, no wheezes, no rales  Abdomen: soft non-tender non-distended, no hepatosplenomegaly  Ext: no clubbing cyanosis or edema  Skin: no rashes and no petechiae  Neuro: alert and oriented X 4, no focal deficits  LABS:                          7.3    6.78  )-----------( 116      ( 22 Oct 2020 06:37 )             22.2         Mean Cell Volume : 93.3 fl  Mean Cell Hemoglobin : 30.7 pg  Mean Cell Hemoglobin Concentration : 32.9 gm/dL  Auto Neutrophil # : x  Auto Lymphocyte # : x  Auto Monocyte # : x  Auto Eosinophil # : x  Auto Basophil # : x  Auto Neutrophil % : x  Auto Lymphocyte % : x  Auto Monocyte % : x  Auto Eosinophil % : x  Auto Basophil % : x    Serial CBC  Hematocrit 22.2  Hemoglobin 7.3  Plat 116  RBC 2.38  WBC 6.78  Serial CBC  Hematocrit 22.3  Hemoglobin 7.6  Plat 106  RBC 2.38  WBC 7.27  Serial CBC  Hematocrit 22.0  Hemoglobin 7.3  Plat 116  RBC 2.34  WBC 7.81  Serial CBC  Hematocrit 25.6  Hemoglobin 8.6  Plat 143  RBC 2.72  WBC 7.44  Serial CBC  Hematocrit 24.8  Hemoglobin 8.3  Plat 108  RBC 2.63  WBC 4.77  Serial CBC  Hematocrit 28.5  Hemoglobin 9.3  Plat 108  RBC 3.04  WBC 6.63  Serial CBC  Hematocrit 26.6  Hemoglobin 8.8  Plat 120  RBC 2.85  WBC 5.59  Serial CBC  Hematocrit 25.0  Hemoglobin 8.5  Plat 119  RBC 2.71  WBC 6.15    10-22    145  |  119<H>  |  10  ----------------------------<  103<H>  4.2   |  16<L>  |  0.85    Ca    7.2<L>      22 Oct 2020 06:37  Phos  2.6     10-22  Mg     2.1     10-22    TPro  4.2<L>  /  Alb  2.4<L>  /  TBili  2.4<H>  /  DBili  x   /  AST  208<H>  /  ALT  87<H>  /  AlkPhos  105  10-22                    BLOOD SMEAR INTERPRETATION:       RADIOLOGY & ADDITIONAL STUDIES:

## 2020-10-22 NOTE — PROGRESS NOTE ADULT - ASSESSMENT
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  BC grew bacteroides.    Problem/Recommendation - 1:  Problem: Bacteremia. Recommendation: bacteroides  most likely GI origin  on antibiotics  he has hypothermia and hypotension and elevated lactic acid  in ICU now  on steroid and pressor    Problem/Recommendation - 2:  ·  Problem: Anemia.  Recommendation: ferritin, b12 folate normal  no hemolysis  most likley due to malnutrition from short bowel syndrome.   Problem/Recommendation - 3:  ·  Problem: Acute deep vein thrombosis (DVT) of other vein of upper extremity.  Recommendation: she has been on xarelto for 2 years.  DVT at left arm and left leg before.  will continue xarelto.

## 2020-10-22 NOTE — PROGRESS NOTE ADULT - SUBJECTIVE AND OBJECTIVE BOX
Patient was seen and examined  Patient is a 65y old  Female who presents with a chief complaint of Hypotension (22 Oct 2020 09:27)      INTERVAL HPI/OVERNIGHT EVENTS:  T(C): 35.5 (10-22-20 @ 09:15), Max: 36.1 (10-21-20 @ 16:28)  HR: 105 (10-22-20 @ 09:45) (86 - 114)  BP: 95/67 (10-22-20 @ 09:45) (74/51 - 124/84)  RR: 31 (10-22-20 @ 09:45) (18 - 33)  SpO2: 99% (10-22-20 @ 09:45) (92% - 100%)  Wt(kg): --  I&O's Summary    21 Oct 2020 07:01  -  22 Oct 2020 07:00  --------------------------------------------------------  IN: 1509.8 mL / OUT: 680 mL / NET: 829.8 mL    22 Oct 2020 07:01  -  22 Oct 2020 09:58  --------------------------------------------------------  IN: 8.4 mL / OUT: 95 mL / NET: -86.6 mL        LABS:                        7.3    6.78  )-----------( 116      ( 22 Oct 2020 06:37 )             22.2     10-22    145  |  119<H>  |  10  ----------------------------<  103<H>  4.2   |  16<L>  |  0.85    Ca    7.2<L>      22 Oct 2020 06:37  Phos  2.6     10-22  Mg     2.1     10-22    TPro  4.2<L>  /  Alb  2.4<L>  /  TBili  2.4<H>  /  DBili  x   /  AST  208<H>  /  ALT  87<H>  /  AlkPhos  105  10-22        CAPILLARY BLOOD GLUCOSE      POCT Blood Glucose.: 105 mg/dL (22 Oct 2020 05:18)  POCT Blood Glucose.: 102 mg/dL (21 Oct 2020 23:31)        ABG - ( 20 Oct 2020 18:47 )  pH, Arterial: 7.35  pH, Blood: x     /  pCO2: 21    /  pO2: 162   / HCO3: 11    / Base Excess: -13.0 /  SaO2: 96                    MEDICATIONS  (STANDING):  albumin human 25% IVPB 50 milliLiter(s) IV Intermittent every 6 hours  chlorhexidine 2% Cloths 1 Application(s) Topical daily  hydrocortisone sodium succinate Injectable 50 milliGRAM(s) IV Push every 6 hours  lactated ringers. 1000 milliLiter(s) (75 mL/Hr) IV Continuous <Continuous>  meropenem  IVPB 1000 milliGRAM(s) IV Intermittent every 8 hours  midodrine. 10 milliGRAM(s) Oral three times a day  mupirocin 2% Nasal 1 Application(s) Both Nostrils two times a day  norepinephrine Infusion 0.05 MICROgram(s)/kG/Min (2.63 mL/Hr) IV Continuous <Continuous>  pantoprazole  Injectable 40 milliGRAM(s) IV Push two times a day    MEDICATIONS  (PRN):  sodium chloride 0.9% lock flush 10 milliLiter(s) IV Push every 1 hour PRN Pre/post blood products, medications, blood draw, and to maintain line patency      RADIOLOGY & ADDITIONAL TESTS:    Imaging Personally Reviewed:  [ ] YES  [ ] NO    REVIEW OF SYSTEMS:  CONSTITUTIONAL: No fever, weight loss, or fatigue  EYES: No eye pain, visual disturbances, or discharge  ENMT:  No difficulty hearing, tinnitus, vertigo; No sinus or throat pain  NECK: No pain or stiffness  BREASTS: No pain, masses, or nipple discharge  RESPIRATORY: No cough, wheezing, chills or hemoptysis; No shortness of breath  CARDIOVASCULAR: No chest pain, palpitations, dizziness, or leg swelling  GASTROINTESTINAL: No abdominal or epigastric pain. No nausea, vomiting, or hematemesis; No diarrhea or constipation. No melena or hematochezia.  GENITOURINARY: No dysuria, frequency, hematuria, or incontinence  NEUROLOGICAL: No headaches, memory loss, loss of strength, numbness, or tremors  SKIN: No itching, burning, rashes, or lesions   LYMPH NODES: No enlarged glands  ENDOCRINE: No heat or cold intolerance; No hair loss  MUSCULOSKELETAL: No joint pain or swelling; No muscle, back, or extremity pain  PSYCHIATRIC: No depression, anxiety, mood swings, or difficulty sleeping  HEME/LYMPH: No easy bruising, or bleeding gums  ALLERY AND IMMUNOLOGIC: No hives or eczema      Consultant(s) Notes Reviewed:  [ x ] YES  [ ] NO    PHYSICAL EXAM:  GENERAL: NAD, well-groomed, well-developed  HEAD:  Atraumatic, Normocephalic  EYES: EOMI, PERRLA, conjunctiva and sclera clear  ENMT: No tonsillar erythema, exudates, or enlargement; Moist mucous membranes, Good dentition, No lesions  NECK: Supple, No JVD, Normal thyroid  NERVOUS SYSTEM:  Alert & Oriented X3, Good concentration; Motor Strength 5/5 B/L upper and lower extremities; DTRs 2+ intact and symmetric  CHEST/LUNG: Clear to percussion bilaterally; No rales, rhonchi, wheezing, or rubs  HEART: Regular rate and rhythm; No murmurs, rubs, or gallops  ABDOMEN: Soft, Nontender, Nondistended; Bowel sounds present  EXTREMITIES:  2+ Peripheral Pulses, No clubbing, cyanosis, or edema  LYMPH: No lymphadenopathy noted  SKIN: No rashes or lesions    Care Discussed with Consultants/Other Providers [ x] YES  [ ] NO

## 2020-10-22 NOTE — PROGRESS NOTE ADULT - ASSESSMENT
65y Female from home, lives with daughter, ambulates with walker with PMH of recurrent SBO's, s/p exp lap, SB resection in 2015, ex lap, ALBINA in 2018, DVT, PE, on Xarelto, IVC filter, chronic leg swelling, and anasarca, came in to the ED due to hypotension during office visit. Patient was found to be bacteremic with bacteroids fragilis. Admitted in ICU due to hypotension and hypothermia. On pressors meropenem , midodrine and Solu-cortef.  BCx X2 negative.     Diagnosis:  >Sepsis  >Bacteremia  >Anemia  >DVT/PE  >Transaminitis    Neuro:  -She is AAOx2 at baseline on evaluation.   -Carl hagger for hypothermia      CVS:  -Patient is hypotensive and hypothermic secondary to sepsis.  -c/w with meropenem   - On clear liquid diet, advance the diet as tolerated  -started on albumin, given albumin level of 1.6  -Midodrine 10 mg TID  -Hold Lasix home med in the setting of hypotension  -CT showed mild b/l pleural effusion  -Pt has pitting edema of lower legs but no signs of pulmonary edema.     Respiratory:  -Pt is tachypneic but not in distress  -RIJ for pressors    Gastrointestinal:  -Patient was bacteremic with bacteroids fragilis, likely secondary from intra abdominal source.  -Patient had multiple SBO in past. Ct abdomen on admission showed ileus, Repeat CT A/P showed ileus and non occlusive ischemic colitis  -No signs of acute abdomen , will start on clear liquid diet.  -LFTs are mildly elevated since admission, trending down. Likely due to sepsis.  -C/w meropenem  -BCx X 2 negative.  -GI, DR Chatterjee deferred doing colonoscopy due to multiple comorbidities    ID:  -Patient is bacteremic with bacteroids likely Gi source.  -Lactate is trending down , today 3.3  -c/w meropenem  -Will repeat culture  - BP on the lower side, pt is on Levophed midodrine and solu cortef  -Monitor vitals Q4      Nephro:  - Kidney function is normal, Cl is 119 with normal sodium and potassium  -On clear liquid diet  -Monitor electrolytes  -Monitor respiratory status    Hem Onc:  -Has h/o DVt and PE in past. Patient is on Xarelto  -Patient has anemia , hgb of 7.3 , f/u CBC and anemia panel in afternoon  -Folate and B12 normal.     Endo:  No issues    GOC:  Full code       65y Female from home, lives with daughter, ambulates with walker with PMH of recurrent SBO's, s/p exp lap, SB resection in 2015, ex lap, ALBINA in 2018, DVT, PE, on Xarelto, IVC filter, chronic leg swelling, and anasarca, came in to the ED due to hypotension during office visit. Patient was found to be bacteremic with bacteroids fragilis. Admitted in ICU due to hypotension and hypothermia. On pressors meropenem , midodrine and Solu-cortef.  BCx X2 negative.     Diagnosis:  >Sepsis  >Bacteremia  >Anemia  >DVT/PE  >Transaminitis    --------------------------------------------Neuro--------------------------------------  -She is AAOx2 at baseline on evaluation.   -Carl huddleston for hypothermia      -------------------------------------CVS-------------------------------  -Patient is hypotensive and hypothermic secondary to sepsis.  -c/w with meropenem   - On clear liquid diet, advance the diet as tolerated  -started on albumin, given albumin level of 1.6  -Midodrine 10 mg TID  -Hold Lasix home med in the setting of hypotension  -CT showed mild b/l pleural effusion  -Pt has pitting edema of lower legs but no signs of pulmonary edema.     --------------------------------Respiratory----------------------  -Pt is tachypneic but not in distress  -RIJ for pressors    ----------------------------------------Gastrointestinal--------------------------------------  -Patient was bacteremic with bacteroids fragilis, likely secondary from intra abdominal source.  -Patient had multiple SBO in past. Ct abdomen on admission showed ileus, Repeat CT A/P showed ileus and non occlusive ischemic colitis  -No signs of acute abdomen , will start on clear liquid diet.  -LFTs are mildly elevated since admission, trending down. Likely due to sepsis.  -C/w meropenem  -f/u ammonia  -BCx X 2 negative.  -GI, DR Chatterjee deferred doing colonoscopy due to multiple comorbidities    -----------------------------------------ID---------------------------------------  -Patient is bacteremic with bacteroids likely Gi source.  -Lactate is trending down , today 3.3  -c/w meropenem  -Will repeat culture  - BP on the lower side, pt is on Levophed midodrine and solu cortef  -Monitor vitals Q4      ---------------------------------------------Nephro-------------------------------------  - Kidney function is normal, Cl is 119 with normal sodium and potassium  -On clear liquid diet  -Monitor electrolytes  -Monitor respiratory status    ----------------------------------------Hem Onc----------------------------------  -Has h/o DVt and PE in past. Patient is on Xarelto  -Patient has anemia , hgb of 7.3 , f/u CBC and anemia panel in afternoon  -Folate and B12 normal.     ----------------------------------------Endo--------------------------------  -HgbA1c 4    ----------------------------------------Prophylaxis----------------------------  DVT: Lovenox  GI: PPI    ---------------------------------------GOC---------------------------  Full code

## 2020-10-22 NOTE — PROGRESS NOTE ADULT - SUBJECTIVE AND OBJECTIVE BOX
Patient is seen and examined at the bed side, is afebrile.  The Surgery recommendation appreciated. The Blood cultures have no growth to date.         REVIEW OF SYSTEMS: All other review systems are negative        ALLERGIES: No Known Allergies        ICU Vital Signs Last 24 Hrs  T(C): 35.4 (22 Oct 2020 15:15), Max: 36 (21 Oct 2020 20:15)  T(F): 95.8 (22 Oct 2020 15:15), Max: 96.8 (21 Oct 2020 20:15)  HR: 96 (22 Oct 2020 16:15) (81 - 110)  BP: 114/80 (22 Oct 2020 16:15) (74/51 - 124/84)  BP(mean): 87 (22 Oct 2020 16:15) (56 - 93)  ABP: --  ABP(mean): --  RR: 25 (22 Oct 2020 16:15) (16 - 33)  SpO2: 97% (22 Oct 2020 16:15) (92% - 100%)        PHYSICAL EXAM:  GENERAL: Not in acute distress  CHEST/LUNG: Not using accessory muscles   HEART: s1 and s2 present  ABDOMEN:  Nontender and  Nondistended  EXTREMITIES: No pedal  edema  CNS: Awake and Alert         LABS:                        6.9    6.51  )-----------( 111      ( 22 Oct 2020 13:40 )             20.8                           7.3    7.81  )-----------( 116      ( 21 Oct 2020 15:25 )             22.0       10-22    145  |  119<H>  |  10  ----------------------------<  103<H>  4.2   |  16<L>  |  0.85    Ca    7.2<L>      22 Oct 2020 06:37  Phos  2.6     10-22  Mg     2.1     10-22    TPro  4.2<L>  /  Alb  2.4<L>  /  TBili  2.4<H>  /  DBili  x   /  AST  208<H>  /  ALT  87<H>  /  AlkPhos  105  10-22    10-21    147<H>  |  121<H>  |  10  ----------------------------<  97  4.0   |  11<L>  |  0.98    Ca    6.8<L>      21 Oct 2020 15:25  Phos  1.9     10-21  Mg     1.8     10-21    TPro  4.0<L>  /  Alb  2.1<L>  /  TBili  2.2<H>  /  DBili  x   /  AST  193<H>  /  ALT  79<H>  /  AlkPhos  111  10-21      Procalcitonin, Serum (10.15.20 @ 11:48)   Procalcitonin, Serum: 0.16: Procalcitonin (PCT) Interpretation (ng/mL) - Diagnosis of systemic   bacterial infection/sepsis       MEDICATIONS  (STANDING):    albumin human 25% IVPB 50 milliLiter(s) IV Intermittent every 6 hours  chlorhexidine 2% Cloths 1 Application(s) Topical daily  enoxaparin Injectable 60 milliGRAM(s) SubCutaneous every 12 hours  hydrocortisone sodium succinate Injectable 50 milliGRAM(s) IV Push every 6 hours  lactated ringers. 1000 milliLiter(s) (75 mL/Hr) IV Continuous <Continuous>  meropenem  IVPB 1000 milliGRAM(s) IV Intermittent every 8 hours  midodrine. 10 milliGRAM(s) Oral three times a day  mupirocin 2% Nasal 1 Application(s) Both Nostrils two times a day  norepinephrine Infusion 0.05 MICROgram(s)/kG/Min (2.63 mL/Hr) IV Continuous <Continuous>  pantoprazole  Injectable 40 milliGRAM(s) IV Push two times a day        RADIOLOGY & ADDITIONAL TESTS:    10/21/20 : CT Abdomen and Pelvis w/ Oral Cont and w/ IV Cont (10.21.20 @ 15:59) Mural thickening of the left colon and rectum. Liquid stool in the colon. Apparent segmental mural thickening of the mid to distal small bowel and the proximal duodenum. Findings may represent nonspecific enterocolitis, noninfectious inflammatory bowel disease, or ischemic bowel. Clinical correlation is recommended. The celiac axis artery, SMA, and KINA are patent without stenosis.    Dilatation of the mid small bowel is again noted. Oral contrast has reached the terminal ileum. Findings may represent small bowel obstruction, or ileus related to nonspecific enterocolitis.   Cholelithiasis.    Moderate to large ascites in the abdomen, increased since the previous examination. 5 mm nonspecific noncalcified left upper lobe lung nodule; if the patient's is in the high risk category (i.e. smoker), follow-up chest CT may be pursued in 12 months to ensure stability.    Combination of atelectasis and consolidation in the left lower lobe.    Possible 1.0 cm hypodense lesion in the left lobe of the thyroid. Thyroid ultrasound may be pursued for further evaluation.   Mild bilateral pleural effusions.    Aging determinate compression fracture at T5 vertebra.          10/13/20 : CT Abdomen and Pelvis w/ IV Cont (10.13.20 @ 18:50) >    Diffuse dilatation of small bowel loops without a clear transition is slightly increased, likely an ileus.  Decreased moderate ascites.    1.5 cm pancreatic versus peripancreatic soft tissue nodule    10/13/20 : Xray Chest 1 View- PORTABLE-Urgent (Xray Chest 1 View- PORTABLE-Urgent .) (10.13.20 @ 16:34) Clear lungs.          MICROBIOLOGY DATA:    MRSA/MSSA PCR (10.21.20 @ 09:41)   MRSA PCR Result.: Detected:     Culture - Blood (10.20.20 @ 22:09)   Specimen Source: .Blood Blood   Culture Results:   No growth to date.     Culture - Blood (10.20.20 @ 22:09)   Specimen Source: .Blood Blood   Culture Results:   No growth to date.   Culture - Blood in AM (10.16.20 @ 10:13)   Specimen Source: .Blood Blood-Peripheral   Culture Results:   No growth to date.     Culture - Blood in AM (10.16.20 @ 10:13)   Specimen Source: .Blood Blood-Peripheral   Culture Results:   No growth to date.     Culture - Blood (10.13.20 @ 22:23)   Growth in anaerobic bottle: Gram Negative Rods   Specimen Source: .Blood Blood-Peripheral   Organism: Blood Culture PCR   Culture Results:   Growth in anaerobic bottle: Bacteroides fragilis   "Susceptibilities not performed"   ***Blood Panel PCR results on this specimen are available   approximately 3 hours after the Gram stain result.***   Gram stain, PCR, and/or culture results may not always   correspond due to difference in methodologies.       Culture - Blood (10.13.20 @ 22:23)   Specimen Source: .Blood Blood-Peripheral   Culture Results:   No growth to date.     Urine Microscopic-Add On (NC) (10.13.20 @ 21:39)   Bacteria: Moderate /HPF   Epithelial Cells: Moderate /HPF   Red Blood Cell - Urine: 5-10 /HPF   White Blood Cell - Urine: 6-10 /HPF            Patient is seen and examined at the bed side, is afebrile. She ias very slow to response today. Ammonia level is elevated.   The Surgery recommendation appreciated. The Blood cultures have no growth to date.         REVIEW OF SYSTEMS: All other review systems are negative        ALLERGIES: No Known Allergies        ICU Vital Signs Last 24 Hrs  T(C): 35.4 (22 Oct 2020 15:15), Max: 36 (21 Oct 2020 20:15)  T(F): 95.8 (22 Oct 2020 15:15), Max: 96.8 (21 Oct 2020 20:15)  HR: 96 (22 Oct 2020 16:15) (81 - 110)  BP: 114/80 (22 Oct 2020 16:15) (74/51 - 124/84)  BP(mean): 87 (22 Oct 2020 16:15) (56 - 93)  ABP: --  ABP(mean): --  RR: 25 (22 Oct 2020 16:15) (16 - 33)  SpO2: 97% (22 Oct 2020 16:15) (92% - 100%)        PHYSICAL EXAM:  GENERAL: Not in acute distress  CHEST/LUNG: Not using accessory muscles   HEART: s1 and s2 present  ABDOMEN:  Nontender and  Nondistended  EXTREMITIES: No pedal  edema  CNS: Awake and Alert         LABS:                        6.9    6.51  )-----------( 111      ( 22 Oct 2020 13:40 )             20.8                           7.3    7.81  )-----------( 116      ( 21 Oct 2020 15:25 )             22.0       10-22    145  |  119<H>  |  10  ----------------------------<  103<H>  4.2   |  16<L>  |  0.85    Ca    7.2<L>      22 Oct 2020 06:37  Phos  2.6     10-22  Mg     2.1     10-22    TPro  4.2<L>  /  Alb  2.4<L>  /  TBili  2.4<H>  /  DBili  x   /  AST  208<H>  /  ALT  87<H>  /  AlkPhos  105  10-22    10-21    147<H>  |  121<H>  |  10  ----------------------------<  97  4.0   |  11<L>  |  0.98    Ca    6.8<L>      21 Oct 2020 15:25  Phos  1.9     10-21  Mg     1.8     10-21    TPro  4.0<L>  /  Alb  2.1<L>  /  TBili  2.2<H>  /  DBili  x   /  AST  193<H>  /  ALT  79<H>  /  AlkPhos  111  10-21      Procalcitonin, Serum (10.15.20 @ 11:48)   Procalcitonin, Serum: 0.16: Procalcitonin (PCT) Interpretation (ng/mL) - Diagnosis of systemic   bacterial infection/sepsis       MEDICATIONS  (STANDING):    albumin human 25% IVPB 50 milliLiter(s) IV Intermittent every 6 hours  chlorhexidine 2% Cloths 1 Application(s) Topical daily  enoxaparin Injectable 60 milliGRAM(s) SubCutaneous every 12 hours  hydrocortisone sodium succinate Injectable 50 milliGRAM(s) IV Push every 6 hours  lactated ringers. 1000 milliLiter(s) (75 mL/Hr) IV Continuous <Continuous>  meropenem  IVPB 1000 milliGRAM(s) IV Intermittent every 8 hours  midodrine. 10 milliGRAM(s) Oral three times a day  mupirocin 2% Nasal 1 Application(s) Both Nostrils two times a day  norepinephrine Infusion 0.05 MICROgram(s)/kG/Min (2.63 mL/Hr) IV Continuous <Continuous>  pantoprazole  Injectable 40 milliGRAM(s) IV Push two times a day        RADIOLOGY & ADDITIONAL TESTS:    10/21/20 : CT Abdomen and Pelvis w/ Oral Cont and w/ IV Cont (10.21.20 @ 15:59) Mural thickening of the left colon and rectum. Liquid stool in the colon. Apparent segmental mural thickening of the mid to distal small bowel and the proximal duodenum. Findings may represent nonspecific enterocolitis, noninfectious inflammatory bowel disease, or ischemic bowel. Clinical correlation is recommended. The celiac axis artery, SMA, and KINA are patent without stenosis.    Dilatation of the mid small bowel is again noted. Oral contrast has reached the terminal ileum. Findings may represent small bowel obstruction, or ileus related to nonspecific enterocolitis.   Cholelithiasis.    Moderate to large ascites in the abdomen, increased since the previous examination. 5 mm nonspecific noncalcified left upper lobe lung nodule; if the patient's is in the high risk category (i.e. smoker), follow-up chest CT may be pursued in 12 months to ensure stability.    Combination of atelectasis and consolidation in the left lower lobe.    Possible 1.0 cm hypodense lesion in the left lobe of the thyroid. Thyroid ultrasound may be pursued for further evaluation.   Mild bilateral pleural effusions.    Aging determinate compression fracture at T5 vertebra.          10/13/20 : CT Abdomen and Pelvis w/ IV Cont (10.13.20 @ 18:50) >    Diffuse dilatation of small bowel loops without a clear transition is slightly increased, likely an ileus.  Decreased moderate ascites.    1.5 cm pancreatic versus peripancreatic soft tissue nodule    10/13/20 : Xray Chest 1 View- PORTABLE-Urgent (Xray Chest 1 View- PORTABLE-Urgent .) (10.13.20 @ 16:34) Clear lungs.          MICROBIOLOGY DATA:    MRSA/MSSA PCR (10.21.20 @ 09:41)   MRSA PCR Result.: Detected:     Culture - Blood (10.20.20 @ 22:09)   Specimen Source: .Blood Blood   Culture Results:   No growth to date.     Culture - Blood (10.20.20 @ 22:09)   Specimen Source: .Blood Blood   Culture Results:   No growth to date.   Culture - Blood in AM (10.16.20 @ 10:13)   Specimen Source: .Blood Blood-Peripheral   Culture Results:   No growth to date.     Culture - Blood in AM (10.16.20 @ 10:13)   Specimen Source: .Blood Blood-Peripheral   Culture Results:   No growth to date.     Culture - Blood (10.13.20 @ 22:23)   Growth in anaerobic bottle: Gram Negative Rods   Specimen Source: .Blood Blood-Peripheral   Organism: Blood Culture PCR   Culture Results:   Growth in anaerobic bottle: Bacteroides fragilis   "Susceptibilities not performed"   ***Blood Panel PCR results on this specimen are available   approximately 3 hours after the Gram stain result.***   Gram stain, PCR, and/or culture results may not always   correspond due to difference in methodologies.       Culture - Blood (10.13.20 @ 22:23)   Specimen Source: .Blood Blood-Peripheral   Culture Results:   No growth to date.     Urine Microscopic-Add On (NC) (10.13.20 @ 21:39)   Bacteria: Moderate /HPF   Epithelial Cells: Moderate /HPF   Red Blood Cell - Urine: 5-10 /HPF   White Blood Cell - Urine: 6-10 /HPF

## 2020-10-22 NOTE — PROGRESS NOTE ADULT - SUBJECTIVE AND OBJECTIVE BOX
66 yo woman admitted to ICU with sepsis - I was consulted after CT abdomen showed ileus vs SBO.   On exam - she appears comfortable, the abdomen is soft, distended, no peritonitis. She is not clinically obstructed.    This patient is very well known to me - she was admitted to my service in Jan 2020 for questionable SBO.   She has had multiple hospital admissions for abdominal issues, including ascites, requiring drainage, anemia from occult GI blood loss, malnutrition and ileus/?SBO, ?short-gut syndrome. I reviewed her surgical history, and she has NOT undergone massive small bowel resection in the past - short-gut syndrome is very unlikely.     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 She has history of IVC filter placement many years ago, followed by supra-hepatic IVC thrombosis/occlusion (evident on chest CT from a few years ago). On recent imaging, the IVC is patent, but the left hepatic vein cannot be visualized, it is likely thrombosed. The left lobe of the liver is atrophic and the portal vein and SMV are dilated - these may be secondary signs of portal hypertension.      I believe she will benefit from hepatic vein angiography with pressure measurement to confirm the diagnosis, then possible intervention -  ?TIPS. Liver US with Doppler can be confirmatory of Budd Chiari but technical expertise may be limited at our institution.  After discussing her case with Vascular Surgery and IR, they recommended the IR service at [facility] as having more experience with advanced hepatic endovascular procedures.     During this admission, there is no acute general surgical intervention needed. Her diet can be advanced, since she is not clinically obstructed.   I will discuss her case with IR again to facilitate a possible in- vs out-patient intervention.

## 2020-10-22 NOTE — PROGRESS NOTE ADULT - SUBJECTIVE AND OBJECTIVE BOX
INTERVAL HPI/OVERNIGHT EVENTS: Overnight lactate trended down, Hgb above 7 , will consider blood transfusion. BCx on 10/21 -ve and flagyl has been discontinued.     Presssors: Yes Levophed  Central Line : RIJ       ANTIBIOTICS:                      Antimicrobial:  meropenem  IVPB 1000 milliGRAM(s) IV Intermittent every 8 hours    Cardiovascular:  midodrine. 10 milliGRAM(s) Oral three times a day  norepinephrine Infusion 0.05 MICROgram(s)/kG/Min IV Continuous <Continuous>    Pulmonary:    Hematalogic:    Other:  albumin human 25% IVPB 50 milliLiter(s) IV Intermittent every 6 hours  chlorhexidine 2% Cloths 1 Application(s) Topical daily  chlorhexidine 4% Liquid 1 Application(s) Topical <User Schedule>  hydrocortisone sodium succinate Injectable 50 milliGRAM(s) IV Push every 6 hours  lactated ringers. 1000 milliLiter(s) IV Continuous <Continuous>  pantoprazole  Injectable 40 milliGRAM(s) IV Push two times a day  sodium chloride 0.9% lock flush 10 milliLiter(s) IV Push every 1 hour PRN    albumin human 25% IVPB 50 milliLiter(s) IV Intermittent every 6 hours  chlorhexidine 2% Cloths 1 Application(s) Topical daily  chlorhexidine 4% Liquid 1 Application(s) Topical <User Schedule>  hydrocortisone sodium succinate Injectable 50 milliGRAM(s) IV Push every 6 hours  lactated ringers. 1000 milliLiter(s) IV Continuous <Continuous>  meropenem  IVPB 1000 milliGRAM(s) IV Intermittent every 8 hours  midodrine. 10 milliGRAM(s) Oral three times a day  norepinephrine Infusion 0.05 MICROgram(s)/kG/Min IV Continuous <Continuous>  pantoprazole  Injectable 40 milliGRAM(s) IV Push two times a day  sodium chloride 0.9% lock flush 10 milliLiter(s) IV Push every 1 hour PRN    Drug Dosing Weight  Height (cm): 167.6 (21 Oct 2020 02:26)  Weight (kg): 56.2 (21 Oct 2020 02:26)  BMI (kg/m2): 20 (21 Oct 2020 02:26)  BSA (m2): 1.63 (21 Oct 2020 02:26)    CENTRAL LINE: [ ] YES [ ] NO  LOCATION:   DATE INSERTED:  REMOVE: [ ] YES [ ] NO  EXPLAIN:    TREJO: [ ] YES [ ] NO    DATE INSERTED:  REMOVE:  [ ] YES [ ] NO  EXPLAIN:    A-LINE:  [ ] YES [ ] NO  LOCATION:   DATE INSERTED:  REMOVE:  [ ] YES [ ] NO  EXPLAIN:    PMH -reviewed admission note, no change since admission    ICU Vital Signs Last 24 Hrs  T(C): 35.2 (22 Oct 2020 08:00), Max: 36.1 (21 Oct 2020 16:28)  T(F): 95.4 (22 Oct 2020 08:00), Max: 96.9 (21 Oct 2020 16:28)  HR: 106 (22 Oct 2020 09:00) (86 - 114)  BP: 93/64 (22 Oct 2020 09:00) (74/51 - 124/84)  BP(mean): 71 (22 Oct 2020 09:00) (56 - 93)  ABP: --  ABP(mean): --  RR: 31 (22 Oct 2020 09:00) (18 - 33)  SpO2: 98% (22 Oct 2020 09:00) (92% - 100%)      ABG - ( 20 Oct 2020 18:47 )  pH, Arterial: 7.35  pH, Blood: x     /  pCO2: 21    /  pO2: 162   / HCO3: 11    / Base Excess: -13.0 /  SaO2: 96                    10-21 @ 07:01  -  10-22 @ 07:00  --------------------------------------------------------  IN: 1509.8 mL / OUT: 680 mL / NET: 829.8 mL            PHYSICAL EXAM:    GENERAL: NAD, well-groomed, well-developed  HEAD:  Atraumatic, Normocephalic  EYES: EOMI, PERRLA, conjunctiva and sclera clear  ENMT: No tonsillar erythema, exudates, or enlargement; Moist mucous membranes, Good dentition, No lesions  NECK: Supple, normal appearance, No JVD; Normal thyroid; Trachea midline  NERVOUS SYSTEM:  Alert & Oriented X3, Good concentration; Motor Strength 5/5 B/L upper and lower extremities; DTRs 2+ intact and symmetric  CHEST/LUNG: No chest deformity; Normal percussion bilaterally; No rales, rhonchi, wheezing   HEART: Regular rate and rhythm; No murmurs, rubs, or gallops  ABDOMEN: Soft, Nontender, Nondistended; Bowel sounds present  EXTREMITIES:  2+ Peripheral Pulses, No clubbing, cyanosis, or edema  LYMPH: No lymphadenopathy noted  SKIN: No rashes or lesions; Good capillary refill      LABS:  CBC Full  -  ( 22 Oct 2020 06:37 )  WBC Count : 6.78 K/uL  RBC Count : 2.38 M/uL  Hemoglobin : 7.3 g/dL  Hematocrit : 22.2 %  Platelet Count - Automated : 116 K/uL  Mean Cell Volume : 93.3 fl  Mean Cell Hemoglobin : 30.7 pg  Mean Cell Hemoglobin Concentration : 32.9 gm/dL  Auto Neutrophil # : x  Auto Lymphocyte # : x  Auto Monocyte # : x  Auto Eosinophil # : x  Auto Basophil # : x  Auto Neutrophil % : x  Auto Lymphocyte % : x  Auto Monocyte % : x  Auto Eosinophil % : x  Auto Basophil % : x    10-22    145  |  119<H>  |  10  ----------------------------<  103<H>  4.2   |  16<L>  |  0.85    Ca    7.2<L>      22 Oct 2020 06:37  Phos  2.6     10-22  Mg     2.1     10-22    TPro  4.2<L>  /  Alb  2.4<L>  /  TBili  2.4<H>  /  DBili  x   /  AST  208<H>  /  ALT  87<H>  /  AlkPhos  105  10-22        Culture Results:   No growth to date. (10-20 @ 22:09)  Culture Results:   No growth to date. (10-20 @ 22:09)      RADIOLOGY & ADDITIONAL STUDIES REVIEWED:  ***    GOALS OF CARE DISCUSSION WITH PATIENT/FAMILY/PROXY:    CRITICAL CARE TIME SPENT: 35 minutes INTERVAL HPI/OVERNIGHT EVENTS: Overnight lactate trended down, Hgb above 7 , will repeat CBC. BCx X2 -ve. CT A/P showed ileus and ischemic colitis. Surgery has been consulted.   Pt is AAOX2 from the baseline, no signs of acute abdomen.     Pressors Yes Levophed 0.08 mcg/kg/min maintaining blood pressure in 90s/50s.   Central Line : RIJ  Carl hagger for hypothermia      ANTIBIOTICS:                      Antimicrobial:  meropenem  IVPB 1000 milliGRAM(s) IV Intermittent every 8 hours    Cardiovascular:  midodrine. 10 milliGRAM(s) Oral three times a day  norepinephrine Infusion 0.05 MICROgram(s)/kG/Min IV Continuous <Continuous>    Pulmonary:    Hematalogic:    Other:  albumin human 25% IVPB 50 milliLiter(s) IV Intermittent every 6 hours  chlorhexidine 2% Cloths 1 Application(s) Topical daily  chlorhexidine 4% Liquid 1 Application(s) Topical <User Schedule>  hydrocortisone sodium succinate Injectable 50 milliGRAM(s) IV Push every 6 hours  lactated ringers. 1000 milliLiter(s) IV Continuous <Continuous>  pantoprazole  Injectable 40 milliGRAM(s) IV Push two times a day  sodium chloride 0.9% lock flush 10 milliLiter(s) IV Push every 1 hour PRN    albumin human 25% IVPB 50 milliLiter(s) IV Intermittent every 6 hours  chlorhexidine 2% Cloths 1 Application(s) Topical daily  chlorhexidine 4% Liquid 1 Application(s) Topical <User Schedule>  hydrocortisone sodium succinate Injectable 50 milliGRAM(s) IV Push every 6 hours  lactated ringers. 1000 milliLiter(s) IV Continuous <Continuous>  meropenem  IVPB 1000 milliGRAM(s) IV Intermittent every 8 hours  midodrine. 10 milliGRAM(s) Oral three times a day  norepinephrine Infusion 0.05 MICROgram(s)/kG/Min IV Continuous <Continuous>  pantoprazole  Injectable 40 milliGRAM(s) IV Push two times a day  sodium chloride 0.9% lock flush 10 milliLiter(s) IV Push every 1 hour PRN    Drug Dosing Weight  Height (cm): 167.6 (21 Oct 2020 02:26)  Weight (kg): 56.2 (21 Oct 2020 02:26)  BMI (kg/m2): 20 (21 Oct 2020 02:26)  BSA (m2): 1.63 (21 Oct 2020 02:26)    CENTRAL LINE: [ ] YES [ ] NO  LOCATION:   DATE INSERTED:  REMOVE: [ ] YES [ ] NO  EXPLAIN:    TREJO: [ ] YES [ ] NO    DATE INSERTED:  REMOVE:  [ ] YES [ ] NO  EXPLAIN:    A-LINE:  [ ] YES [ ] NO  LOCATION:   DATE INSERTED:  REMOVE:  [ ] YES [ ] NO  EXPLAIN:    PMH -reviewed admission note, no change since admission    ICU Vital Signs Last 24 Hrs  T(C): 35.2 (22 Oct 2020 08:00), Max: 36.1 (21 Oct 2020 16:28)  T(F): 95.4 (22 Oct 2020 08:00), Max: 96.9 (21 Oct 2020 16:28)  HR: 106 (22 Oct 2020 09:00) (86 - 114)  BP: 93/64 (22 Oct 2020 09:00) (74/51 - 124/84)  BP(mean): 71 (22 Oct 2020 09:00) (56 - 93)  ABP: --  ABP(mean): --  RR: 31 (22 Oct 2020 09:00) (18 - 33)  SpO2: 98% (22 Oct 2020 09:00) (92% - 100%)      ABG - ( 20 Oct 2020 18:47 )  pH, Arterial: 7.35  pH, Blood: x     /  pCO2: 21    /  pO2: 162   / HCO3: 11    / Base Excess: -13.0 /  SaO2: 96                    10-21 @ 07:01  -  10-22 @ 07:00  --------------------------------------------------------  IN: 1509.8 mL / OUT: 680 mL / NET: 829.8 mL            PHYSICAL EXAM:    GENERAL: NAD, elderly female laying comfortably on bed.   HEAD:  Atraumatic, Normocephalic  EYES: EOMI, PERRLA, conjunctiva and sclera clear  NECK: Supple, normal appearance, No JVD; Normal thyroid; Trachea midline  NERVOUS SYSTEM:  Alert & Oriented X2  CHEST/LUNG: No chest deformity; Normal percussion bilaterally; No rales, rhonchi, wheezing   HEART: Regular rate and rhythm; No murmurs, rubs, or gallops  ABDOMEN: Soft, Nontender, Nondistended; Bowel sounds present  EXTREMITIES:  2+ Peripheral Pulses, No clubbing, cyanosis, or edema  LYMPH: No lymphadenopathy noted  SKIN: No rashes or lesions; Good capillary refill        LABS:  CBC Full  -  ( 22 Oct 2020 06:37 )  WBC Count : 6.78 K/uL  RBC Count : 2.38 M/uL  Hemoglobin : 7.3 g/dL  Hematocrit : 22.2 %  Platelet Count - Automated : 116 K/uL  Mean Cell Volume : 93.3 fl  Mean Cell Hemoglobin : 30.7 pg  Mean Cell Hemoglobin Concentration : 32.9 gm/dL  Auto Neutrophil # : x  Auto Lymphocyte # : x  Auto Monocyte # : x  Auto Eosinophil # : x  Auto Basophil # : x  Auto Neutrophil % : x  Auto Lymphocyte % : x  Auto Monocyte % : x  Auto Eosinophil % : x  Auto Basophil % : x    10-22    145  |  119<H>  |  10  ----------------------------<  103<H>  4.2   |  16<L>  |  0.85    Ca    7.2<L>      22 Oct 2020 06:37  Phos  2.6     10-22  Mg     2.1     10-22    TPro  4.2<L>  /  Alb  2.4<L>  /  TBili  2.4<H>  /  DBili  x   /  AST  208<H>  /  ALT  87<H>  /  AlkPhos  105  10-22        Culture Results:   No growth to date. (10-20 @ 22:09)  Culture Results:   No growth to date. (10-20 @ 22:09)      RADIOLOGY & ADDITIONAL STUDIES REVIEWED:    Mural thickening of the left colon and rectum. Liquid stool in the colon. Apparent segmental mural thickening of the mid to distal small bowel and the proximal duodenum. Findings may represent nonspecific enterocolitis, noninfectious inflammatory bowel disease, or ischemic bowel. Clinical correlation is recommended. The celiac axis artery, SMA, and KINA are patent without stenosis.    GOALS OF CARE DISCUSSION WITH PATIENT/FAMILY/PROXY:    CRITICAL CARE TIME SPENT: 35 minutes

## 2020-10-22 NOTE — PROGRESS NOTE ADULT - ASSESSMENT
Patient is a 65y old  Female from home, lives with daughter, ambulates with walker with PMH of recurrent SBO's, s/p exp lap, SB resection in 2015, ex lap, ALBINA in 2018, DVT, PE, on Xarelto, IVC filter, chronic leg swelling, and anasarca, Now send in to the ER by her PCP, Dr. Ross, for evaluation of  generalized weakness and hypotension BP of 80/40 during office visit. On admission, she found to have no fever and BP was in lower normal range and no Leukocytosis. The CXR is clear and CT abd/pelvis consistent with Ileus. The ID consult requested to assist with evaluation of infectious etiology of episode of hypotension. Found to have Bacteroides bacteremia and now developed septic shock on Cefepime and Flagyl. Hence transferred to ICU. 10/20/20.    # Hypotension  at office- BP of 80/40 - resolved   #  Bacteroides fragilis Bacteremia - 10/13/20 - ? source most likely GI- Repeat Blood Cxs have NGTD 10/16/20  # Ileus  - h/o recurrent SBO and s/p EXp. LAp  # COVID 19 negative   # Septic shock ( Hypothermia + hypotensive)- transferred to ICU since requiring pressor- source GI, The CT abd/pelvis shows nonspecific enterocolitis, noninfectious inflammatory bowel disease, or ischemic bowel, along with ileus.       would recommend:    1. Continue Mupirocin X 5 days  2. Continue Meropenem   3. Monitor kidney function and adjust Abx doses accordingly   4. Management of Ileus as per ICU protocol    d/w ICU team      Attending Attestation:    Spent more than 45 minutes on total encounter, more than 50 % of the visit was spent counseling and/or coordinating care by the Attending physician.   Patient is a 65y old  Female from home, lives with daughter, ambulates with walker with PMH of recurrent SBO's, s/p exp lap, SB resection in 2015, ex lap, ALBINA in 2018, DVT, PE, on Xarelto, IVC filter, chronic leg swelling, and anasarca, Now send in to the ER by her PCP, Dr. Ross, for evaluation of  generalized weakness and hypotension BP of 80/40 during office visit. On admission, she found to have no fever and BP was in lower normal range and no Leukocytosis. The CXR is clear and CT abd/pelvis consistent with Ileus. The ID consult requested to assist with evaluation of infectious etiology of episode of hypotension. Found to have Bacteroides bacteremia and now developed septic shock on Cefepime and Flagyl. Hence transferred to ICU. 10/20/20.    # Hypotension  at office- BP of 80/40 - resolved   #  Bacteroides fragilis Bacteremia - 10/13/20 - ? source most likely GI- Repeat Blood Cxs have NGTD 10/16/20  # Ileus  - h/o recurrent SBO and s/p EXp. LAp  # COVID 19 negative   # Septic shock ( Hypothermia + hypotensive)- transferred to ICU since requiring pressor- source GI, The CT abd/pelvis shows nonspecific enterocolitis, noninfectious inflammatory bowel disease, or ischemic bowel, along with ileus.       would recommend:    1. Continue Lactulose and   2. Contineu Mupirocin X 5 days  3. Continue Meropenem   4. Monitor kidney function and adjust Abx doses accordingly   5. Management of Ileus as per ICU protocol    d/w ICU team and Family at the bed side       Attending Attestation:    Spent more than 45 minutes on total encounter, more than 50 % of the visit was spent counseling and/or coordinating care by the Attending physician.

## 2020-10-23 NOTE — CHART NOTE - NSCHARTNOTEFT_GEN_A_CORE
PC  made two attempts to meet the patients' daughter at the bedside.  Ms. Bucio was not present.  PC Sw will f/u on Monday and remain available as needed.

## 2020-10-23 NOTE — PROGRESS NOTE ADULT - SUBJECTIVE AND OBJECTIVE BOX
less responsive now  still hypothermia and hypotensive requiring pressor      MEDICATIONS  (STANDING):  chlorhexidine 2% Cloths 1 Application(s) Topical daily  enoxaparin Injectable 60 milliGRAM(s) SubCutaneous every 12 hours  lactated ringers. 1000 milliLiter(s) (75 mL/Hr) IV Continuous <Continuous>  lactulose Syrup 20 Gram(s) Oral every 6 hours  meropenem  IVPB 1000 milliGRAM(s) IV Intermittent every 8 hours  midodrine. 10 milliGRAM(s) Oral three times a day  mupirocin 2% Nasal 1 Application(s) Both Nostrils two times a day  norepinephrine Infusion 0.05 MICROgram(s)/kG/Min (2.63 mL/Hr) IV Continuous <Continuous>  pantoprazole  Injectable 40 milliGRAM(s) IV Push two times a day    MEDICATIONS  (PRN):  sodium chloride 0.9% lock flush 10 milliLiter(s) IV Push every 1 hour PRN Pre/post blood products, medications, blood draw, and to maintain line patency      Allergies    No Known Allergies    Intolerances        Vital Signs Last 24 Hrs  T(C): 35.2 (23 Oct 2020 08:00), Max: 35.7 (23 Oct 2020 04:45)  T(F): 95.4 (23 Oct 2020 08:00), Max: 96.3 (23 Oct 2020 04:45)  HR: 104 (23 Oct 2020 09:15) (81 - 125)  BP: 92/62 (23 Oct 2020 09:15) (77/54 - 143/108)  BP(mean): 69 (23 Oct 2020 09:15) (59 - 117)  RR: 21 (23 Oct 2020 09:15) (16 - 33)  SpO2: 97% (23 Oct 2020 09:15) (91% - 100%)    PHYSICAL EXAM  General: adult in NAD  HEENT: clear oropharynx, anicteric sclera, pink conjunctiva  Neck: supple  CV: normal S1/S2 with no murmur rubs or gallops  Lungs: positive air movement b/l ant lungs,clear to auscultation, no wheezes, no rales  Abdomen: soft non-tender non-distended, no hepatosplenomegaly  Ext: no clubbing cyanosis or edema  Skin: no rashes and no petechiae  Neuro: alert and oriented X 4, no focal deficits  LABS:                          8.7    6.89  )-----------( 107      ( 23 Oct 2020 06:23 )             25.9         Mean Cell Volume : 93.8 fl  Mean Cell Hemoglobin : 31.5 pg  Mean Cell Hemoglobin Concentration : 33.6 gm/dL  Auto Neutrophil # : 5.93 K/uL  Auto Lymphocyte # : 0.90 K/uL  Auto Monocyte # : 0.07 K/uL  Auto Eosinophil # : 0.00 K/uL  Auto Basophil # : 0.00 K/uL  Auto Neutrophil % : 84.0 %  Auto Lymphocyte % : 13.0 %  Auto Monocyte % : 1.0 %  Auto Eosinophil % : 0.0 %  Auto Basophil % : 0.0 %    Serial CBC  Hematocrit 25.9  Hemoglobin 8.7  Plat 107  RBC 2.76  WBC 6.89  Serial CBC  Hematocrit 26.0  Hemoglobin 8.7  Plat 101  RBC 2.80  WBC 6.17  Serial CBC  Hematocrit 20.8  Hemoglobin 6.9  Plat 111  RBC 2.23  WBC 6.51  Serial CBC  Hematocrit 22.2  Hemoglobin 7.3  Plat 116  RBC 2.38  WBC 6.78  Serial CBC  Hematocrit 22.3  Hemoglobin 7.6  Plat 106  RBC 2.38  WBC 7.27  Serial CBC  Hematocrit 22.0  Hemoglobin 7.3  Plat 116  RBC 2.34  WBC 7.81  Serial CBC  Hematocrit 25.6  Hemoglobin 8.6  Plat 143  RBC 2.72  WBC 7.44  Serial CBC  Hematocrit 24.8  Hemoglobin 8.3  Plat 108  RBC 2.63  WBC 4.77  Serial CBC  Hematocrit 28.5  Hemoglobin 9.3  Plat 108  RBC 3.04  WBC 6.63  Serial CBC  Hematocrit 26.6  Hemoglobin 8.8  Plat 120  RBC 2.85  WBC 5.59    10-23    147<H>  |  120<H>  |  10  ----------------------------<  90  4.2   |  17<L>  |  0.94    Ca    7.7<L>      23 Oct 2020 06:23  Phos  2.0     10-23  Mg     2.3     10-23    TPro  4.7<L>  /  Alb  2.8<L>  /  TBili  3.0<H>  /  DBili  x   /  AST  248<H>  /  ALT  105<H>  /  AlkPhos  123<H>  10-23          Ferritin, Serum: 810 ng/mL (10-22 @ 22:11)  Iron - Total Binding Capacity.: 63 ug/dL (10-22 @ 13:40)            BLOOD SMEAR INTERPRETATION:       RADIOLOGY & ADDITIONAL STUDIES:

## 2020-10-23 NOTE — PROGRESS NOTE ADULT - ASSESSMENT
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  BC grew bacteroides.    Problem/Recommendation - 1:  Problem: Bacteremia. Recommendation: bacteroides  most likely GI origin  on antibiotics  he has hypothermia and hypotension and elevated lactic acid  in ICU now  on steroid and pressor    Problem/Recommendation - 2:  ·  Problem: Anemia.  Recommendation: ferritin, b12 folate normal  no hemolysis  most likley due to malnutrition from short bowel syndrome.   Problem/Recommendation - 3:  ·  Problem: Acute deep vein thrombosis (DVT) of other vein of upper extremity.  Recommendation: she has been on xarelto for 2 years.  DVT at left arm and left leg before.  will continue xarelto.   4. lethargy  ?due to sepsis, ?to r/o bleeding

## 2020-10-23 NOTE — PROGRESS NOTE ADULT - SUBJECTIVE AND OBJECTIVE BOX
Patient was seen and examined  Patient is a 65y old  Female who presents with a chief complaint of Hypotension (23 Oct 2020 16:13)      INTERVAL HPI/OVERNIGHT EVENTS:  T(C): 34.7 (10-23-20 @ 16:00), Max: 36.4 (10-23-20 @ 10:00)  HR: 86 (10-23-20 @ 16:00) (82 - 125)  BP: 103/74 (10-23-20 @ 16:00) (82/65 - 143/108)  RR: 23 (10-23-20 @ 16:00) (18 - 37)  SpO2: 97% (10-23-20 @ 16:00) (91% - 100%)  Wt(kg): --  I&O's Summary    22 Oct 2020 07:01  -  23 Oct 2020 07:00  --------------------------------------------------------  IN: 433.1 mL / OUT: 198 mL / NET: 235.1 mL    23 Oct 2020 07:01  -  23 Oct 2020 17:22  --------------------------------------------------------  IN: 310 mL / OUT: 125 mL / NET: 185 mL        LABS:                        8.6    5.63  )-----------( 98       ( 23 Oct 2020 11:54 )             26.2     10-23    147<H>  |  120<H>  |  10  ----------------------------<  90  4.2   |  17<L>  |  0.94    Ca    7.7<L>      23 Oct 2020 06:23  Phos  2.0     10-23  Mg     2.3     10-23    TPro  4.7<L>  /  Alb  2.8<L>  /  TBili  3.0<H>  /  DBili  x   /  AST  248<H>  /  ALT  105<H>  /  AlkPhos  123<H>  10-23    PT/INR - ( 23 Oct 2020 14:01 )   PT: See Note;   INR: See Note ratio         PTT - ( 23 Oct 2020 14:01 )  PTT:>200.0 sec    CAPILLARY BLOOD GLUCOSE      POCT Blood Glucose.: 111 mg/dL (23 Oct 2020 12:07)  POCT Blood Glucose.: 95 mg/dL (23 Oct 2020 04:55)  POCT Blood Glucose.: 103 mg/dL (23 Oct 2020 01:53)        ABG - ( 22 Oct 2020 19:53 )  pH, Arterial: 7.38  pH, Blood: x     /  pCO2: 27    /  pO2: 83    / HCO3: 15    / Base Excess: -8.4  /  SaO2: 94                    MEDICATIONS  (STANDING):  chlorhexidine 2% Cloths 1 Application(s) Topical daily  lactated ringers. 1000 milliLiter(s) (75 mL/Hr) IV Continuous <Continuous>  lactulose Syrup 30 Gram(s) Oral every 6 hours  meropenem  IVPB 1000 milliGRAM(s) IV Intermittent every 8 hours  midodrine. 10 milliGRAM(s) Oral three times a day  mupirocin 2% Nasal 1 Application(s) Both Nostrils two times a day  norepinephrine Infusion 0.05 MICROgram(s)/kG/Min (2.63 mL/Hr) IV Continuous <Continuous>  nystatin Powder 1 Application(s) Topical two times a day  pantoprazole  Injectable 40 milliGRAM(s) IV Push two times a day  rifAXIMin 550 milliGRAM(s) Oral two times a day    MEDICATIONS  (PRN):  sodium chloride 0.9% lock flush 10 milliLiter(s) IV Push every 1 hour PRN Pre/post blood products, medications, blood draw, and to maintain line patency      RADIOLOGY & ADDITIONAL TESTS:    Imaging Personally Reviewed:  [ ] YES  [ ] NO    REVIEW OF SYSTEMS:  poor historian       Consultant(s) Notes Reviewed:  [ ] YES  [ ] NO    PHYSICAL EXAM:  GENERAL: NAD, well-groomed, well-developed  HEAD:  Atraumatic, Normocephalic  EYES: EOMI, PERRLA, conjunctiva and sclera clear  ENMT: No tonsillar erythema, exudates, or enlargement; Moist mucous membranes, Good dentition, No lesions  NECK: Supple, No JVD, Normal thyroid  NERVOUS SYSTEM:  awake but lethargic   CHEST/LUNG: Clear to percussion bilaterally; No rales, rhonchi, wheezing, or rubs  HEART: Regular rate and rhythm; No murmurs, rubs, or gallops  ABDOMEN: distended  EXTREMITIES:  2+ Peripheral Pulses, No clubbing, cyanosis, or edema  LYMPH: No lymphadenopathy noted  SKIN: No rashes or lesions    Care Discussed with Consultants/Other Providers [ x] YES  [ ] NO

## 2020-10-23 NOTE — CONSULT NOTE ADULT - PROBLEM SELECTOR RECOMMENDATION 3
Daughter reports patient debilitated since hospitalizations, s/p JOHNATHAN, ambulatory short distances at home.
she has been on xarelto for 2 years.  DVT at left arm and left leg before.  will continue xarelto

## 2020-10-23 NOTE — PROGRESS NOTE ADULT - ASSESSMENT
Patient is a 65y old  Female from home, lives with daughter, ambulates with walker with PMH of recurrent SBO's, s/p exp lap, SB resection in 2015, ex lap, ALBINA in 2018, DVT, PE, on Xarelto, IVC filter, chronic leg swelling, and anasarca, Now send in to the ER by her PCP, Dr. Ross, for evaluation of  generalized weakness and hypotension BP of 80/40 during office visit. On admission, she found to have no fever and BP was in lower normal range and no Leukocytosis. The CXR is clear and CT abd/pelvis consistent with Ileus. The ID consult requested to assist with evaluation of infectious etiology of episode of hypotension. Found to have Bacteroides bacteremia and now developed septic shock on Cefepime and Flagyl. Hence transferred to ICU. 10/20/20.    # Hypotension  at office- BP of 80/40 - resolved   #  Bacteroides fragilis Bacteremia - 10/13/20 - ? source most likely GI- Repeat Blood Cxs have NGTD 10/16/20  # Ileus  - h/o recurrent SBO and s/p EXp. LAp  # COVID 19 negative   # Septic shock ( Hypothermia + hypotensive)- transferred to ICU since requiring pressor- source GI, The CT abd/pelvis shows nonspecific enterocolitis, noninfectious inflammatory bowel disease, or ischemic bowel, along with ileus.       would recommend:    1. Aspiration precaution  2. Continue Lactulose  and monitor Ammonia level   3. Continue Meropenem for now  4. Monitor kidney function and adjust Abx doses accordingly   5. Management of Ileus and ischemic colitis as per ICU protocol    d/w ICU team       Attending Attestation:    Spent more than 45 minutes on total encounter, more than 50 % of the visit was spent counseling and/or coordinating care by the Attending physician.

## 2020-10-23 NOTE — CONSULT NOTE ADULT - SUBJECTIVE AND OBJECTIVE BOX
Chief Complaint:  Patient is a 65y old  Female who presents with a chief complaint of Hypotension (23 Oct 2020 09:35)      Reason for consult: Concern for Budd Chiari sy    HPI:  64 yo Female with Hx of recurrent SBO`s s/p exp lap, small bowel resection in , ex lap, ALBINA in 2018, DVT, PE on Xarelto, IVC filter and s/p supra-hepatic IVC thrombosis/occlusion, anemia, anasarca was sent to ED by PCP for hypotension, generalized weakness and chills and was admitted for hypotension, r/p sepsis on 10/13/2020. She was afebrile on arrival, without leukocytosis, severe hypoalbuminemia (alb 1.0) with lactate 2.5 and with CT abd/pelvis showing slightly increased diffuse dilation of small bowel loops without clear transition, likely ileus. She was found to have Gram negative bacteremia (Bacteroides fragilis) and was started on Cefepime and Flagyl on 10/15, possible GI source. Patient is being followed by GI, Dr. Felix. Patient was seen by neurology for presumed toxic metabolic encephalopathy. Patient was seen by hematology for anemia, no evidence of hemolysis, most likely 2/2 malnutrition from short bowel syndrome. Patient was noted to be hypothermic and was evaluated by ICU, fluid challenge, albumin, midodrine and continuation of antibiotics were recommended. She had aseptic debridement of all toenails and hyperkeratotic lesions b/l plantar foot on 10/20. Patient was transferred to ICU on 10/21 for hypotension and hypothermia and was started on pressor support. Repeat CT abd/pelvis showed non-specific enterocolitis, noninfectious inflammatory bowel disease or ischemic bowel along with ileus. Antibiotics were switched to Meropenem on 10/21 and surgery f/u was recommended for ischemic bowel. Repeat BCs 2x negative. Colonoscopy was deferred due to multiple comorbidities. She was started on CLD on 10/22. Surgery reviewed surgical Hx and found that short-gut syndrome is very unlikely as “she has not undergone massive small bowel resection” and suspicion of Budd-Chiari syndrome hira given the Hx of supra-hepatic IVC thrombosis/occlusion in past (IVC patent now), the lack of visualization of L hepatic vein, atrophic L lobe of liver, dilated PV and SMV. Case was discussed with IR and Vascular surgery and was recommended to undergo further evaluation, hepatic endovascular procedure at [facility]. She remains on Levophed, midodrine, and is on solu Cortef.   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     Most recent labs with WBC 6.89 (84% joy),  (on admission had PLT > 200), Hb 8.7, s/p 1U PRBC on 10/22 (Hb prior 6.9) and 1U PRBC on 10/15. Slight schistocytes, [place] cells on smear. Na 147, BUN/Cr 10/0.94. Alb 2.8, total protein 7.7. Se bi 3.0 (on admission 0.6),  (on admission 46),  (on admission 70) ,  (on admission 125). Liver enzymes normalized initially and started to rise again on 10/20 when patient was transferred to ICU b/o worsening sepsis. Bilirubin progressively rising. No INR available.   CT a/p 10/21 Liver, CBD, pancreas, spleen unremarkable, gallstones, GB exam limited b/o motion artifact, large increasing ascites, celiac axis artery, SMA, KINA patent; dilated proximal ascending colon (7.9 cm); segmental mural thickening of mid to distal small bowel, proximal duodenum (nonspecific enterocolitis vs. noninfectious inflammatory bowel disease vs. ischemic bowel); dilation of mid small bowel, oral contrast reached terminal ileum, but mild luminal narrowing at distal small bowel (SBO vs. ileus related nonspecific enterocolitis).     Patient remains hypothermic, requiring pressors, with worsening mental status. On full dose AC with Lovenox.       Hospital Medications:  chlorhexidine 2% Cloths 1 Application(s) Topical daily  enoxaparin Injectable 60 milliGRAM(s) SubCutaneous every 12 hours  lactated ringers. 1000 milliLiter(s) IV Continuous <Continuous>  lactulose Syrup 30 Gram(s) Oral every 6 hours  meropenem  IVPB 1000 milliGRAM(s) IV Intermittent every 8 hours  midodrine. 10 milliGRAM(s) Oral three times a day  mupirocin 2% Nasal 1 Application(s) Both Nostrils two times a day  norepinephrine Infusion 0.05 MICROgram(s)/kG/Min IV Continuous <Continuous>  nystatin Powder 1 Application(s) Topical two times a day  pantoprazole  Injectable 40 milliGRAM(s) IV Push two times a day  rifAXIMin 550 milliGRAM(s) Oral two times a day  sodium chloride 0.9% lock flush 10 milliLiter(s) IV Push every 1 hour PRN      ROS:   Cannot obtain due to the patient condition    PHYSICAL EXAM:   Vital Signs:  Vital Signs Last 24 Hrs  T(C): 36.1 (23 Oct 2020 12:00), Max: 36.4 (23 Oct 2020 10:00)  T(F): 97 (23 Oct 2020 12:00), Max: 97.6 (23 Oct 2020 10:00)  HR: 85 (23 Oct 2020 14:00) (81 - 125)  BP: 88/61 (23 Oct 2020 14:00) (77/54 - 143/108)  BP(mean): 67 (23 Oct 2020 14:00) (59 - 117)  RR: 26 (23 Oct 2020 14:00) (16 - 37)  SpO2: 95% (23 Oct 2020 14:00) (91% - 100%)  Daily     Daily Weight in k.1 (23 Oct 2020 07:00)    GENERAL: ill appearing, chachectic  NEURO: opens eyes to verbal stimuli, following some comment (opening mouth), but not all (not squeezing hand), cannot assess asterixis due to lack of cooperation  HEENT:icteric sclera, small amount buccal mucosal bleeding  CHEST: no respiratory distress, no accessory muscle use  CARDIAC: regular rate, rhythm  ABDOMEN: soft, non-tender, non-distended, no rebound or guarding, BS+  EXTREMITIES: no edema  SKIN: no rash    LABS: reviewed                        8.6    5.63  )-----------( 98       ( 23 Oct 2020 11:54 )             26.2     10-    147<H>  |  120<H>  |  10  ----------------------------<  90  4.2   |  17<L>  |  0.94    Ca    7.7<L>      23 Oct 2020 06:23  Phos  2.0     10-23  Mg     2.3     10-23    TPro  4.7<L>  /  Alb  2.8<L>  /  TBili  3.0<H>  /  DBili  x   /  AST  248<H>  /  ALT  105<H>  /  AlkPhos  123<H>  1023    LIVER FUNCTIONS - ( 23 Oct 2020 06:23 )  Alb: 2.8 g/dL / Pro: 4.7 g/dL / ALK PHOS: 123 U/L / ALT: 105 U/L DA / AST: 248 U/L / GGT: x             Interval Diagnostic Studies: see sunrise for full report

## 2020-10-23 NOTE — CONSULT NOTE ADULT - ASSESSMENT
64 yo Female with Hx of recurrent SBO`s s/p exp lap, small bowel resection in 2015, ex lap, ALBINA in 2018, DVT, PE on Xarelto, IVC filter and s/p supra-hepatic IVC thrombosis/occlusion, anemia, anasarca was sent to ED by PCP for hypotension, generalized weakness and chills and was admitted for hypotension, r/p sepsis on 10/13/2020, found to have Bacteroides fragilis bacteremia, suspected GI source, was treated with cefepime + metronidazole, remained hypothermic, hypotensive, was transferred to ICU and switched to meropenem on 10/21. Repeat CT 10/21 suggested  non-specific enterocolitis, noninfectious inflammatory bowel disease or ischemic bowel along with ileus, and concern for SBO given mild luminal narrowing at distal small bowel.   Patient remains hypotensive, hypothermic, without leukocytosis but with neutrophilia, requiring pressor support, septic. Liver enzymes are up-trending, along with bilirubin, possibly due to ongoing sepsis.      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   -Venography would be gold standard for diagnosis (To d/w radiology whether HVs visualized on prior imaging? Whether intrahepatic or subcapsular hepatic collaterals seen?) Dr. Alvarez did talk to IR and Vascular surgery earlier, and patient transfer was recommended to [facility]. As per ICU patient is not enough stable for transfer at present.  -no caudate lobe hypertrophy reported (present in 75% of cases), no macroregenerative nodules reported, characteristic pattern of parenchymal perfusion not described, but study reported limited due to motion artifact and those are not always present  -already on full dose anticoagulation     # Ascites   - Consider Dx paracentesis given ongoing sepsis (last 2 sets of BCx nl)   (last was in 2/2019, when SAAG < 1.1 serum alb 1.0, fluid alb < 0.2, total prot < 1)     # Encephalopathy   - Ammonia 125   - c/w lactulose and c/w rifaximin   - consider CT head (on full dose AC)    # Anemia, thrombocytopenia   - possibly malnutrition plus chronic GI loss  - hematology following  - FOBT pos, f/u w GI  - f/u with hematology regarding thrombocytopenia (PLT on admission 245 now 98, schistocytes on smear)  - please obtain INR    # Hypophosphatemia  - suggest replacement    Will continue to follow  D/w primary team  Thank you for consult   66 yo Female with Hx of recurrent SBO`s s/p exp lap, small bowel resection in 2015, ex lap, ALBINA in 2018, DVT, PE on Xarelto, IVC filter and s/p supra-hepatic IVC thrombosis/occlusion, anemia, anasarca was sent to ED by PCP for hypotension, generalized weakness and chills and was admitted for hypotension, r/p sepsis on 10/13/2020, found to have Bacteroides fragilis bacteremia, suspected GI source, was treated with cefepime + metronidazole, remained hypothermic, hypotensive, was transferred to ICU and switched to meropenem on 10/21. Repeat CT 10/21 suggested  non-specific enterocolitis, noninfectious inflammatory bowel disease or ischemic bowel along with ileus, and concern for SBO given mild luminal narrowing at distal small bowel.   Patient remains hypotensive, hypothermic, without leukocytosis but with neutrophilia, requiring pressor support, septic. Liver enzymes are up-trending, along with bilirubin, possibly due to ongoing sepsis.      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   -Venography would be gold standard for diagnosis (To d/w radiology whether HVs visualized on prior imaging? Whether intrahepatic or subcapsular hepatic collaterals seen?) Dr. Alvarez did talk to IR and Vascular surgery earlier, and patient transfer was recommended to [facility]. As per ICU patient is not enough stable for transfer at present.  -no caudate lobe hypertrophy reported (present in 75% of cases), no macroregenerative nodules reported, characteristic pattern of parenchymal perfusion not described, but study reported limited due to motion artifact and those are not always present  -already on full dose anticoagulation     # Ascites   - Dx paracentesis could be useful given ongoing sepsis (last 2 sets of BCx nl) - discussed with IR, no safe window based on last CT; plus now patient with coagulopathy, thrombocytopenia    (last was in 2/2019, when SAAG < 1.1 serum alb 1.0, fluid alb < 0.2, total prot < 1)     # Encephalopathy   - Ammonia 125   - c/w lactulose and c/w rifaximin   - consider CT head (on full dose AC)    # Anemia, thrombocytopenia   - possibly malnutrition plus chronic GI loss  - hematology following  - FOBT pos, f/u w GI  - f/u with hematology regarding thrombocytopenia (PLT on admission 245 now 98, schistocytes on smear), sepis, DIC, HIT?  - please obtain INR    # Hypophosphatemia  - suggest replacement    Will continue to follow  D/w primary team  Thank you for consult

## 2020-10-23 NOTE — PROGRESS NOTE ADULT - SUBJECTIVE AND OBJECTIVE BOX
INTERVAL HPI/OVERNIGHT EVENTS: ***    PRESSORS: [ ] YES [ ] NO  WHICH:    ANTIBIOTICS:                      Antimicrobial:  meropenem  IVPB 1000 milliGRAM(s) IV Intermittent every 8 hours    Cardiovascular:  midodrine. 10 milliGRAM(s) Oral three times a day  norepinephrine Infusion 0.05 MICROgram(s)/kG/Min IV Continuous <Continuous>    Pulmonary:    Hematalogic:  enoxaparin Injectable 60 milliGRAM(s) SubCutaneous every 12 hours    Other:  chlorhexidine 2% Cloths 1 Application(s) Topical daily  lactated ringers. 1000 milliLiter(s) IV Continuous <Continuous>  lactulose Syrup 20 Gram(s) Oral every 6 hours  mupirocin 2% Nasal 1 Application(s) Both Nostrils two times a day  pantoprazole  Injectable 40 milliGRAM(s) IV Push two times a day  sodium chloride 0.9% lock flush 10 milliLiter(s) IV Push every 1 hour PRN    chlorhexidine 2% Cloths 1 Application(s) Topical daily  enoxaparin Injectable 60 milliGRAM(s) SubCutaneous every 12 hours  lactated ringers. 1000 milliLiter(s) IV Continuous <Continuous>  lactulose Syrup 20 Gram(s) Oral every 6 hours  meropenem  IVPB 1000 milliGRAM(s) IV Intermittent every 8 hours  midodrine. 10 milliGRAM(s) Oral three times a day  mupirocin 2% Nasal 1 Application(s) Both Nostrils two times a day  norepinephrine Infusion 0.05 MICROgram(s)/kG/Min IV Continuous <Continuous>  pantoprazole  Injectable 40 milliGRAM(s) IV Push two times a day  sodium chloride 0.9% lock flush 10 milliLiter(s) IV Push every 1 hour PRN    Drug Dosing Weight  Height (cm): 167.6 (21 Oct 2020 02:26)  Weight (kg): 56.2 (21 Oct 2020 02:26)  BMI (kg/m2): 20 (21 Oct 2020 02:26)  BSA (m2): 1.63 (21 Oct 2020 02:26)    CENTRAL LINE: [ ] YES [ ] NO  LOCATION:   DATE INSERTED:  REMOVE: [ ] YES [ ] NO  EXPLAIN:    TREJO: [ ] YES [ ] NO    DATE INSERTED:  REMOVE:  [ ] YES [ ] NO  EXPLAIN:    A-LINE:  [ ] YES [ ] NO  LOCATION:   DATE INSERTED:  REMOVE:  [ ] YES [ ] NO  EXPLAIN:    PMH -reviewed admission note, no change since admission    ICU Vital Signs Last 24 Hrs  T(C): 35.7 (23 Oct 2020 04:45), Max: 35.7 (23 Oct 2020 04:45)  T(F): 96.3 (23 Oct 2020 04:45), Max: 96.3 (23 Oct 2020 04:45)  HR: 99 (23 Oct 2020 07:00) (81 - 125)  BP: 92/59 (23 Oct 2020 07:00) (77/54 - 143/108)  BP(mean): 67 (23 Oct 2020 07:00) (59 - 117)  ABP: --  ABP(mean): --  RR: 27 (23 Oct 2020 07:00) (16 - 33)  SpO2: 97% (23 Oct 2020 07:00) (91% - 100%)      ABG - ( 22 Oct 2020 19:53 )  pH, Arterial: 7.38  pH, Blood: x     /  pCO2: 27    /  pO2: 83    / HCO3: 15    / Base Excess: -8.4  /  SaO2: 94                    10-22 @ 07:01  -  10-23 @ 07:00  --------------------------------------------------------  IN: 433.1 mL / OUT: 198 mL / NET: 235.1 mL            PHYSICAL EXAM:    GENERAL: NAD, well-groomed, well-developed  HEAD:  Atraumatic, Normocephalic  EYES: EOMI, PERRLA, conjunctiva and sclera clear  ENMT: No tonsillar erythema, exudates, or enlargement; Moist mucous membranes, Good dentition, No lesions  NECK: Supple, normal appearance, No JVD; Normal thyroid; Trachea midline  NERVOUS SYSTEM:  Alert & Oriented X3, Good concentration; Motor Strength 5/5 B/L upper and lower extremities; DTRs 2+ intact and symmetric  CHEST/LUNG: No chest deformity; Normal percussion bilaterally; No rales, rhonchi, wheezing   HEART: Regular rate and rhythm; No murmurs, rubs, or gallops  ABDOMEN: Soft, Nontender, Nondistended; Bowel sounds present  EXTREMITIES:  2+ Peripheral Pulses, No clubbing, cyanosis, or edema  LYMPH: No lymphadenopathy noted  SKIN: No rashes or lesions; Good capillary refill      LABS:  CBC Full  -  ( 23 Oct 2020 06:23 )  WBC Count : x  RBC Count : 2.76 M/uL  Hemoglobin : 8.7 g/dL  Hematocrit : 25.9 %  Platelet Count - Automated : 107 K/uL  Mean Cell Volume : 93.8 fl  Mean Cell Hemoglobin : 31.5 pg  Mean Cell Hemoglobin Concentration : 33.6 gm/dL  Auto Neutrophil # : x  Auto Lymphocyte # : x  Auto Monocyte # : x  Auto Eosinophil # : x  Auto Basophil # : x  Auto Neutrophil % : x  Auto Lymphocyte % : x  Auto Monocyte % : x  Auto Eosinophil % : x  Auto Basophil % : x    10-23    147<H>  |  120<H>  |  10  ----------------------------<  90  4.2   |  17<L>  |  0.94    Ca    7.7<L>      23 Oct 2020 06:23  Phos  2.0     10-23  Mg     2.3     10-23    TPro  4.7<L>  /  Alb  2.8<L>  /  TBili  3.0<H>  /  DBili  x   /  AST  248<H>  /  ALT  105<H>  /  AlkPhos  123<H>  10-23        Culture Results:   No growth to date. (10-20 @ 22:09)  Culture Results:   No growth to date. (10-20 @ 22:09)      RADIOLOGY & ADDITIONAL STUDIES REVIEWED:  ***    GOALS OF CARE DISCUSSION WITH PATIENT/FAMILY/PROXY:    CRITICAL CARE TIME SPENT: 35 minutes INTERVAL HPI/OVERNIGHT EVENTS: Pt mental status got worsened yesterday, she was alert oriented but not answering to any questions.  Ammonia was on 88 ,she was started on lactulose 20g q6h.  s/p 1 PRBC yesterday, Hgb 8.7. No active bleeding. Nurse reported that pt had a little amount of blood on wipe after she took the rectal temp yesterday.  This morning pt mental status has been the same, lactate is normal. Had 1 BM since this am.       PRESSORS: [ ] YES [ ] NO  WHICH:    ANTIBIOTICS:                      Antimicrobial:  meropenem  IVPB 1000 milliGRAM(s) IV Intermittent every 8 hours    Cardiovascular:  midodrine. 10 milliGRAM(s) Oral three times a day  norepinephrine Infusion 0.05 MICROgram(s)/kG/Min IV Continuous <Continuous>    Pulmonary:    Hematalogic:  enoxaparin Injectable 60 milliGRAM(s) SubCutaneous every 12 hours    Other:  chlorhexidine 2% Cloths 1 Application(s) Topical daily  lactated ringers. 1000 milliLiter(s) IV Continuous <Continuous>  lactulose Syrup 20 Gram(s) Oral every 6 hours  mupirocin 2% Nasal 1 Application(s) Both Nostrils two times a day  pantoprazole  Injectable 40 milliGRAM(s) IV Push two times a day  sodium chloride 0.9% lock flush 10 milliLiter(s) IV Push every 1 hour PRN    chlorhexidine 2% Cloths 1 Application(s) Topical daily  enoxaparin Injectable 60 milliGRAM(s) SubCutaneous every 12 hours  lactated ringers. 1000 milliLiter(s) IV Continuous <Continuous>  lactulose Syrup 20 Gram(s) Oral every 6 hours  meropenem  IVPB 1000 milliGRAM(s) IV Intermittent every 8 hours  midodrine. 10 milliGRAM(s) Oral three times a day  mupirocin 2% Nasal 1 Application(s) Both Nostrils two times a day  norepinephrine Infusion 0.05 MICROgram(s)/kG/Min IV Continuous <Continuous>  pantoprazole  Injectable 40 milliGRAM(s) IV Push two times a day  sodium chloride 0.9% lock flush 10 milliLiter(s) IV Push every 1 hour PRN    Drug Dosing Weight  Height (cm): 167.6 (21 Oct 2020 02:26)  Weight (kg): 56.2 (21 Oct 2020 02:26)  BMI (kg/m2): 20 (21 Oct 2020 02:26)  BSA (m2): 1.63 (21 Oct 2020 02:26)    CENTRAL LINE: [ ] YES [ ] NO  LOCATION:   DATE INSERTED:  REMOVE: [ ] YES [ ] NO  EXPLAIN:    TREJO: [ ] YES [ ] NO    DATE INSERTED:  REMOVE:  [ ] YES [ ] NO  EXPLAIN:    A-LINE:  [ ] YES [ ] NO  LOCATION:   DATE INSERTED:  REMOVE:  [ ] YES [ ] NO  EXPLAIN:    PMH -reviewed admission note, no change since admission    ICU Vital Signs Last 24 Hrs  T(C): 35.7 (23 Oct 2020 04:45), Max: 35.7 (23 Oct 2020 04:45)  T(F): 96.3 (23 Oct 2020 04:45), Max: 96.3 (23 Oct 2020 04:45)  HR: 99 (23 Oct 2020 07:00) (81 - 125)  BP: 92/59 (23 Oct 2020 07:00) (77/54 - 143/108)  BP(mean): 67 (23 Oct 2020 07:00) (59 - 117)  ABP: --  ABP(mean): --  RR: 27 (23 Oct 2020 07:00) (16 - 33)  SpO2: 97% (23 Oct 2020 07:00) (91% - 100%)      ABG - ( 22 Oct 2020 19:53 )  pH, Arterial: 7.38  pH, Blood: x     /  pCO2: 27    /  pO2: 83    / HCO3: 15    / Base Excess: -8.4  /  SaO2: 94                    10-22 @ 07:01  -  10-23 @ 07:00  --------------------------------------------------------  IN: 433.1 mL / OUT: 198 mL / NET: 235.1 mL            PHYSICAL EXAM:    GENERAL: NAD, well-groomed, well-developed  HEAD:  Atraumatic, Normocephalic  EYES: EOMI, PERRLA, conjunctiva and sclera clear  ENMT: No tonsillar erythema, exudates, or enlargement; Moist mucous membranes, Good dentition, No lesions  NECK: Supple, normal appearance, No JVD; Normal thyroid; Trachea midline  NERVOUS SYSTEM:  Alert & Oriented X3, Good concentration; Motor Strength 5/5 B/L upper and lower extremities; DTRs 2+ intact and symmetric  CHEST/LUNG: No chest deformity; Normal percussion bilaterally; No rales, rhonchi, wheezing   HEART: Regular rate and rhythm; No murmurs, rubs, or gallops  ABDOMEN: Soft, Nontender, Nondistended; Bowel sounds present  EXTREMITIES:  2+ Peripheral Pulses, No clubbing, cyanosis, or edema  LYMPH: No lymphadenopathy noted  SKIN: No rashes or lesions; Good capillary refill      LABS:  CBC Full  -  ( 23 Oct 2020 06:23 )  WBC Count : x  RBC Count : 2.76 M/uL  Hemoglobin : 8.7 g/dL  Hematocrit : 25.9 %  Platelet Count - Automated : 107 K/uL  Mean Cell Volume : 93.8 fl  Mean Cell Hemoglobin : 31.5 pg  Mean Cell Hemoglobin Concentration : 33.6 gm/dL  Auto Neutrophil # : x  Auto Lymphocyte # : x  Auto Monocyte # : x  Auto Eosinophil # : x  Auto Basophil # : x  Auto Neutrophil % : x  Auto Lymphocyte % : x  Auto Monocyte % : x  Auto Eosinophil % : x  Auto Basophil % : x    10-23    147<H>  |  120<H>  |  10  ----------------------------<  90  4.2   |  17<L>  |  0.94    Ca    7.7<L>      23 Oct 2020 06:23  Phos  2.0     10-23  Mg     2.3     10-23    TPro  4.7<L>  /  Alb  2.8<L>  /  TBili  3.0<H>  /  DBili  x   /  AST  248<H>  /  ALT  105<H>  /  AlkPhos  123<H>  10-23        Culture Results:   No growth to date. (10-20 @ 22:09)  Culture Results:   No growth to date. (10-20 @ 22:09)      RADIOLOGY & ADDITIONAL STUDIES REVIEWED:  ***    GOALS OF CARE DISCUSSION WITH PATIENT/FAMILY/PROXY:    CRITICAL CARE TIME SPENT: 35 minutes INTERVAL HPI/OVERNIGHT EVENTS: Pt mental status got worsened yesterday, she was alert oriented but not answering to any questions.  Ammonia was on 88 ,she was started on lactulose 20g q6h.  s/p 1 PRBC yesterday, Hgb 8.7. No active bleeding. Nurse reported that pt had a little amount of blood on wipe after she took the rectal temp yesterday.  This morning pt mental status has been the same, lactate is normal. Had 1 BM since this am.       Pressors: Levophed and midodrine   Central line: RIJ      ANTIBIOTICS:                      Antimicrobial:  meropenem  IVPB 1000 milliGRAM(s) IV Intermittent every 8 hours    Cardiovascular:  midodrine. 10 milliGRAM(s) Oral three times a day  norepinephrine Infusion 0.05 MICROgram(s)/kG/Min IV Continuous <Continuous>    Pulmonary:    Hematalogic:  enoxaparin Injectable 60 milliGRAM(s) SubCutaneous every 12 hours    Other:  chlorhexidine 2% Cloths 1 Application(s) Topical daily  lactated ringers. 1000 milliLiter(s) IV Continuous <Continuous>  lactulose Syrup 20 Gram(s) Oral every 6 hours  mupirocin 2% Nasal 1 Application(s) Both Nostrils two times a day  pantoprazole  Injectable 40 milliGRAM(s) IV Push two times a day  sodium chloride 0.9% lock flush 10 milliLiter(s) IV Push every 1 hour PRN    chlorhexidine 2% Cloths 1 Application(s) Topical daily  enoxaparin Injectable 60 milliGRAM(s) SubCutaneous every 12 hours  lactated ringers. 1000 milliLiter(s) IV Continuous <Continuous>  lactulose Syrup 20 Gram(s) Oral every 6 hours  meropenem  IVPB 1000 milliGRAM(s) IV Intermittent every 8 hours  midodrine. 10 milliGRAM(s) Oral three times a day  mupirocin 2% Nasal 1 Application(s) Both Nostrils two times a day  norepinephrine Infusion 0.05 MICROgram(s)/kG/Min IV Continuous <Continuous>  pantoprazole  Injectable 40 milliGRAM(s) IV Push two times a day  sodium chloride 0.9% lock flush 10 milliLiter(s) IV Push every 1 hour PRN    Drug Dosing Weight  Height (cm): 167.6 (21 Oct 2020 02:26)  Weight (kg): 56.2 (21 Oct 2020 02:26)  BMI (kg/m2): 20 (21 Oct 2020 02:26)  BSA (m2): 1.63 (21 Oct 2020 02:26)    CENTRAL LINE: [ ] YES [ ] NO  LOCATION:   DATE INSERTED:  REMOVE: [ ] YES [ ] NO  EXPLAIN:    TREJO: [ ] YES [ ] NO    DATE INSERTED:  REMOVE:  [ ] YES [ ] NO  EXPLAIN:    A-LINE:  [ ] YES [ ] NO  LOCATION:   DATE INSERTED:  REMOVE:  [ ] YES [ ] NO  EXPLAIN:    PMH -reviewed admission note, no change since admission    ICU Vital Signs Last 24 Hrs  T(C): 35.7 (23 Oct 2020 04:45), Max: 35.7 (23 Oct 2020 04:45)  T(F): 96.3 (23 Oct 2020 04:45), Max: 96.3 (23 Oct 2020 04:45)  HR: 99 (23 Oct 2020 07:00) (81 - 125)  BP: 92/59 (23 Oct 2020 07:00) (77/54 - 143/108)  BP(mean): 67 (23 Oct 2020 07:00) (59 - 117)  ABP: --  ABP(mean): --  RR: 27 (23 Oct 2020 07:00) (16 - 33)  SpO2: 97% (23 Oct 2020 07:00) (91% - 100%)      ABG - ( 22 Oct 2020 19:53 )  pH, Arterial: 7.38  pH, Blood: x     /  pCO2: 27    /  pO2: 83    / HCO3: 15    / Base Excess: -8.4  /  SaO2: 94                    10-22 @ 07:01  -  10-23 @ 07:00  --------------------------------------------------------  IN: 433.1 mL / OUT: 198 mL / NET: 235.1 mL            PHYSICAL EXAM:    GENERAL: NAD, well-groomed, well-developed  HEAD:  Atraumatic, Normocephalic  EYES: EOMI, PERRLA, conjunctiva and sclera clear  ENMT: No tonsillar erythema, exudates, or enlargement; Moist mucous membranes, Good dentition, No lesions  NECK: Supple, normal appearance, No JVD; Normal thyroid; Trachea midline  NERVOUS SYSTEM:  Alert & Oriented X3, Good concentration; Motor Strength 5/5 B/L upper and lower extremities; DTRs 2+ intact and symmetric  CHEST/LUNG: No chest deformity; Normal percussion bilaterally; No rales, rhonchi, wheezing   HEART: Regular rate and rhythm; No murmurs, rubs, or gallops  ABDOMEN: Soft, Nontender, Nondistended; Bowel sounds present  EXTREMITIES:  2+ Peripheral Pulses, No clubbing, cyanosis, or edema  LYMPH: No lymphadenopathy noted  SKIN: No rashes or lesions; Good capillary refill      LABS:  CBC Full  -  ( 23 Oct 2020 06:23 )  WBC Count : x  RBC Count : 2.76 M/uL  Hemoglobin : 8.7 g/dL  Hematocrit : 25.9 %  Platelet Count - Automated : 107 K/uL  Mean Cell Volume : 93.8 fl  Mean Cell Hemoglobin : 31.5 pg  Mean Cell Hemoglobin Concentration : 33.6 gm/dL  Auto Neutrophil # : x  Auto Lymphocyte # : x  Auto Monocyte # : x  Auto Eosinophil # : x  Auto Basophil # : x  Auto Neutrophil % : x  Auto Lymphocyte % : x  Auto Monocyte % : x  Auto Eosinophil % : x  Auto Basophil % : x    10-23    147<H>  |  120<H>  |  10  ----------------------------<  90  4.2   |  17<L>  |  0.94    Ca    7.7<L>      23 Oct 2020 06:23  Phos  2.0     10-23  Mg     2.3     10-23    TPro  4.7<L>  /  Alb  2.8<L>  /  TBili  3.0<H>  /  DBili  x   /  AST  248<H>  /  ALT  105<H>  /  AlkPhos  123<H>  10-23        Culture Results:   No growth to date. (10-20 @ 22:09)  Culture Results:   No growth to date. (10-20 @ 22:09)      RADIOLOGY & ADDITIONAL STUDIES REVIEWED:  ***    GOALS OF CARE DISCUSSION WITH PATIENT/FAMILY/PROXY:    CRITICAL CARE TIME SPENT: 35 minutes INTERVAL HPI/OVERNIGHT EVENTS: Pt mental status got worsened yesterday, she was alert oriented but not answering to any questions.  Ammonia was on 88 ,she was started on lactulose 20g q6h.  s/p 1 PRBC yesterday, Hgb 8.7. No active bleeding. Nurse reported that pt had a little amount of blood on wipe after she took the rectal temp yesterday.  This morning pt mental status has been altered, encephalopathic, ammonia of 125. lactate is normal. Had 1 BM since this am.       Pressors: Levophed and midodrine   Central line: RIJ       ANTIBIOTICS:                      Antimicrobial:  meropenem  IVPB 1000 milliGRAM(s) IV Intermittent every 8 hours    Cardiovascular:  midodrine. 10 milliGRAM(s) Oral three times a day  norepinephrine Infusion 0.05 MICROgram(s)/kG/Min IV Continuous <Continuous>    Pulmonary:    Hematalogic:  enoxaparin Injectable 60 milliGRAM(s) SubCutaneous every 12 hours    Other:  chlorhexidine 2% Cloths 1 Application(s) Topical daily  lactated ringers. 1000 milliLiter(s) IV Continuous <Continuous>  lactulose Syrup 20 Gram(s) Oral every 6 hours  mupirocin 2% Nasal 1 Application(s) Both Nostrils two times a day  pantoprazole  Injectable 40 milliGRAM(s) IV Push two times a day  sodium chloride 0.9% lock flush 10 milliLiter(s) IV Push every 1 hour PRN    chlorhexidine 2% Cloths 1 Application(s) Topical daily  enoxaparin Injectable 60 milliGRAM(s) SubCutaneous every 12 hours  lactated ringers. 1000 milliLiter(s) IV Continuous <Continuous>  lactulose Syrup 20 Gram(s) Oral every 6 hours  meropenem  IVPB 1000 milliGRAM(s) IV Intermittent every 8 hours  midodrine. 10 milliGRAM(s) Oral three times a day  mupirocin 2% Nasal 1 Application(s) Both Nostrils two times a day  norepinephrine Infusion 0.05 MICROgram(s)/kG/Min IV Continuous <Continuous>  pantoprazole  Injectable 40 milliGRAM(s) IV Push two times a day  sodium chloride 0.9% lock flush 10 milliLiter(s) IV Push every 1 hour PRN    Drug Dosing Weight  Height (cm): 167.6 (21 Oct 2020 02:26)  Weight (kg): 56.2 (21 Oct 2020 02:26)  BMI (kg/m2): 20 (21 Oct 2020 02:26)  BSA (m2): 1.63 (21 Oct 2020 02:26)    CENTRAL LINE: [ ] YES [ ] NO  LOCATION:   DATE INSERTED:  REMOVE: [ ] YES [ ] NO  EXPLAIN:    TREJO: [ ] YES [ ] NO    DATE INSERTED:  REMOVE:  [ ] YES [ ] NO  EXPLAIN:    A-LINE:  [ ] YES [ ] NO  LOCATION:   DATE INSERTED:  REMOVE:  [ ] YES [ ] NO  EXPLAIN:    PMH -reviewed admission note, no change since admission    ICU Vital Signs Last 24 Hrs  T(C): 35.7 (23 Oct 2020 04:45), Max: 35.7 (23 Oct 2020 04:45)  T(F): 96.3 (23 Oct 2020 04:45), Max: 96.3 (23 Oct 2020 04:45)  HR: 99 (23 Oct 2020 07:00) (81 - 125)  BP: 92/59 (23 Oct 2020 07:00) (77/54 - 143/108)  BP(mean): 67 (23 Oct 2020 07:00) (59 - 117)  ABP: --  ABP(mean): --  RR: 27 (23 Oct 2020 07:00) (16 - 33)  SpO2: 97% (23 Oct 2020 07:00) (91% - 100%)      ABG - ( 22 Oct 2020 19:53 )  pH, Arterial: 7.38  pH, Blood: x     /  pCO2: 27    /  pO2: 83    / HCO3: 15    / Base Excess: -8.4  /  SaO2: 94                    10-22 @ 07:01  -  10-23 @ 07:00  --------------------------------------------------------  IN: 433.1 mL / OUT: 198 mL / NET: 235.1 mL            PHYSICAL EXAM:    GENERAL: NAD, NC 2 L, alert and oriented but not answering questions  HEAD:  Atraumatic, Normocephalic  EYES: icterus sclera, no conjunctivitis   ENMT:  some mucosal bleeding was noted on lips, no signs of active bleeding  NECK: Supple, normal appearance, No JVD; Normal thyroid; Trachea midline  NERVOUS SYSTEM:  Altered mental status,   CHEST/LUNG: No chest deformity; Normal percussion bilaterally; No rales, rhonchi, wheezing   HEART: Regular rate and rhythm; No murmurs, rubs, or gallops  ABDOMEN: Soft, Nontender, mildly distended, Bowel sounds present  EXTREMITIES:  2+ Peripheral Pulses, No clubbing, cyanosis, but pitting edema +ve  LYMPH: No lymphadenopathy noted  SKIN: No rashes or lesions; Good capillary refill      LABS:  CBC Full  -  ( 23 Oct 2020 06:23 )  WBC Count : x  RBC Count : 2.76 M/uL  Hemoglobin : 8.7 g/dL  Hematocrit : 25.9 %  Platelet Count - Automated : 107 K/uL  Mean Cell Volume : 93.8 fl  Mean Cell Hemoglobin : 31.5 pg  Mean Cell Hemoglobin Concentration : 33.6 gm/dL  Auto Neutrophil # : x  Auto Lymphocyte # : x  Auto Monocyte # : x  Auto Eosinophil # : x  Auto Basophil # : x  Auto Neutrophil % : x  Auto Lymphocyte % : x  Auto Monocyte % : x  Auto Eosinophil % : x  Auto Basophil % : x    10-23    147<H>  |  120<H>  |  10  ----------------------------<  90  4.2   |  17<L>  |  0.94    Ca    7.7<L>      23 Oct 2020 06:23  Phos  2.0     10-23  Mg     2.3     10-23    TPro  4.7<L>  /  Alb  2.8<L>  /  TBili  3.0<H>  /  DBili  x   /  AST  248<H>  /  ALT  105<H>  /  AlkPhos  123<H>  10-23        Culture Results:   No growth to date. (10-20 @ 22:09)  Culture Results:   No growth to date. (10-20 @ 22:09)      RADIOLOGY & ADDITIONAL STUDIES REVIEWED:      ural thickening of the left colon and rectum. Liquid stool in the colon. Apparent segmental mural thickening of the mid to distal small bowel and the proximal duodenum. Findings may represent nonspecific enterocolitis, noninfectious inflammatory bowel disease, or ischemic bowel. Clinical correlation is recommended. The celiac axis artery, SMA, and KINA are patent without stenosis.    Dilatation of the mid small bowel is again noted. Oral contrast has reached the terminal ileum. Findings may represent small bowel obstruction, or ileus related to nonspecific enterocolitis.    Cholelithiasis.    Moderate to large ascites in the abdomen, increased since the previous examination.    5 mm nonspecific noncalcified left upper lobe lung nodule; if the patient's is in the high risk category (i.e. smoker), follow-up chest CT may be pursued in 12 months to ensure stability.    Combination of atelectasis and consolidation in the left lower lobe.    Possible 1.0 cm hypodense lesion in the left lobe of the thyroid. Thyroid ultrasound may be pursued for further evaluation.    Mild bilateral pleural effusions.      GOALS OF CARE DISCUSSION WITH PATIENT/FAMILY/PROXY:    CRITICAL CARE TIME SPENT: 35 minutes

## 2020-10-23 NOTE — CONSULT NOTE ADULT - CONVERSATION DETAILS
Spoke with patient's daughter Jus by phone regarding current condition, high risk for ongoing complications,  overall goals of care.  She is primary decisionmaker, her brother is in [facility]. Discussed risks/benefits of CPR/intubation given her debilitated state and comorbidities. She expressed that they had never discussed previously, patient had not made her wishes known. She verbalized understanding and will discuss w her family regarding code status. She will be coming in to the hospital later today. Support provided. Remains full code for now.

## 2020-10-23 NOTE — PROGRESS NOTE ADULT - SUBJECTIVE AND OBJECTIVE BOX
Patient is seen and examined at the bed side, is afebrile, and on pressor.  She becomes more altered mentally,  Ammonia level is worsening despite of high dose Lactulose.           REVIEW OF SYSTEMS: Unable to obtain due to mental status         ALLERGIES: No Known Allergies        ICU Vital Signs Last 24 Hrs  T(C): 34.7 (23 Oct 2020 16:00), Max: 36.4 (23 Oct 2020 10:00)  T(F): 94.5 (23 Oct 2020 16:00), Max: 97.6 (23 Oct 2020 10:00)  HR: 90 (23 Oct 2020 19:00) (71 - 125)  BP: 106/89 (23 Oct 2020 19:00) (82/65 - 143/108)  BP(mean): 92 (23 Oct 2020 19:00) (64 - 117)  ABP: --  ABP(mean): --  RR: 26 (23 Oct 2020 19:00) (18 - 37)  SpO2: 96% (23 Oct 2020 19:00) (91% - 100%)        PHYSICAL EXAM:  GENERAL: Not in acute distress  CHEST/LUNG: Not using accessory muscles   HEART: s1 and s2 present  ABDOMEN:  Nontender and  Nondistended  EXTREMITIES: No pedal  edema  CNS: Awake , Alert but confused         LABS:                        6.9    6.51  )-----------( 111      ( 22 Oct 2020 13:40 )             20.8                           7.3    7.81  )-----------( 116      ( 21 Oct 2020 15:25 )             22.0         10-22    145  |  119<H>  |  10  ----------------------------<  103<H>  4.2   |  16<L>  |  0.85    Ca    7.2<L>      22 Oct 2020 06:37  Phos  2.6     10-22  Mg     2.1     10-22    TPro  4.2<L>  /  Alb  2.4<L>  /  TBili  2.4<H>  /  DBili  x   /  AST  208<H>  /  ALT  87<H>  /  AlkPhos  105  10-22    10-21    147<H>  |  121<H>  |  10  ----------------------------<  97  4.0   |  11<L>  |  0.98    Ca    6.8<L>      21 Oct 2020 15:25  Phos  1.9     10-21  Mg     1.8     10-21    TPro  4.0<L>  /  Alb  2.1<L>  /  TBili  2.2<H>  /  DBili  x   /  AST  193<H>  /  ALT  79<H>  /  AlkPhos  111  10-21      Procalcitonin, Serum (10.15.20 @ 11:48)   Procalcitonin, Serum: 0.16: Procalcitonin (PCT) Interpretation (ng/mL) - Diagnosis of systemic   bacterial infection/sepsis         MEDICATIONS  (STANDING):    chlorhexidine 2% Cloths 1 Application(s) Topical daily  dextrose 5% + sodium chloride 0.45%. 1000 milliLiter(s) (75 mL/Hr) IV Continuous <Continuous>  lactated ringers. 1000 milliLiter(s) (75 mL/Hr) IV Continuous <Continuous>  lactulose Syrup 30 Gram(s) Oral every 6 hours  meropenem  IVPB 1000 milliGRAM(s) IV Intermittent every 8 hours  midodrine. 10 milliGRAM(s) Oral three times a day  mupirocin 2% Nasal 1 Application(s) Both Nostrils two times a day  norepinephrine Infusion 0.05 MICROgram(s)/kG/Min (2.63 mL/Hr) IV Continuous <Continuous>  nystatin Powder 1 Application(s) Topical two times a day  pantoprazole  Injectable 40 milliGRAM(s) IV Push two times a day  rifAXIMin 550 milliGRAM(s) Oral two times a day        RADIOLOGY & ADDITIONAL TESTS:    10/21/20 : CT Abdomen and Pelvis w/ Oral Cont and w/ IV Cont (10.21.20 @ 15:59) Mural thickening of the left colon and rectum. Liquid stool in the colon. Apparent segmental mural thickening of the mid to distal small bowel and the proximal duodenum. Findings may represent nonspecific enterocolitis, noninfectious inflammatory bowel disease, or ischemic bowel. Clinical correlation is recommended. The celiac axis artery, SMA, and KINA are patent without stenosis.    Dilatation of the mid small bowel is again noted. Oral contrast has reached the terminal ileum. Findings may represent small bowel obstruction, or ileus related to nonspecific enterocolitis.   Cholelithiasis.    Moderate to large ascites in the abdomen, increased since the previous examination. 5 mm nonspecific noncalcified left upper lobe lung nodule; if the patient's is in the high risk category (i.e. smoker), follow-up chest CT may be pursued in 12 months to ensure stability.    Combination of atelectasis and consolidation in the left lower lobe.    Possible 1.0 cm hypodense lesion in the left lobe of the thyroid. Thyroid ultrasound may be pursued for further evaluation.   Mild bilateral pleural effusions.    Aging determinate compression fracture at T5 vertebra.          10/13/20 : CT Abdomen and Pelvis w/ IV Cont (10.13.20 @ 18:50) >    Diffuse dilatation of small bowel loops without a clear transition is slightly increased, likely an ileus.  Decreased moderate ascites.    1.5 cm pancreatic versus peripancreatic soft tissue nodule    10/13/20 : Xray Chest 1 View- PORTABLE-Urgent (Xray Chest 1 View- PORTABLE-Urgent .) (10.13.20 @ 16:34) Clear lungs.          MICROBIOLOGY DATA:    MRSA/MSSA PCR (10.21.20 @ 09:41)   MRSA PCR Result.: Detected:       Culture - Blood (10.20.20 @ 22:09)   Specimen Source: .Blood Blood   Culture Results:  No growth to date.     Culture - Blood (10.20.20 @ 22:09)   Specimen Source: .Blood Blood   Culture Results:   No growth to date.     Culture - Blood in AM (10.16.20 @ 10:13)   Specimen Source: .Blood Blood-Peripheral   Culture Results:   No growth to date.     Culture - Blood in AM (10.16.20 @ 10:13)   Specimen Source: .Blood Blood-Peripheral   Culture Results:   No growth to date.     Culture - Blood (10.13.20 @ 22:23)   Growth in anaerobic bottle: Gram Negative Rods   Specimen Source: .Blood Blood-Peripheral   Organism: Blood Culture PCR   Culture Results:   Growth in anaerobic bottle: Bacteroides fragilis   "Susceptibilities not performed"   ***Blood Panel PCR results on this specimen are available   approximately 3 hours after the Gram stain result.***   Gram stain, PCR, and/or culture results may not always   correspond due to difference in methodologies.     Culture - Blood (10.13.20 @ 22:23)   Specimen Source: .Blood Blood-Peripheral   Culture Results:   No growth to date.     Urine Microscopic-Add On (NC) (10.13.20 @ 21:39)   Bacteria: Moderate /HPF   Epithelial Cells: Moderate /HPF   Red Blood Cell - Urine: 5-10 /HPF   White Blood Cell - Urine: 6-10 /HPF

## 2020-10-23 NOTE — CONSULT NOTE ADULT - PROBLEM SELECTOR RECOMMENDATION 2
likely TME, in setting of septic shock, elevated ammonia, on lactulose. Consider CT head, on AC.
ferritin, b12 folate normal  no hemolysis  most likley due to malnutrition from short bowel syndrome

## 2020-10-23 NOTE — CONSULT NOTE ADULT - SUBJECTIVE AND OBJECTIVE BOX
HPI:  65y Female from home, lives with daughter, ambulates with walker with PMH of recurrent SBO's, s/p exp lap, SB resection in 2015, ex lap, ALBINA in 2018, DVT, PE, on Xarelto, IVC filter, chronic leg swelling, and anasarca, came in to the ED after being sent by PCP Dr. Ross for generalized weakness and hypotension during office visit. Patient was seen by PCP for weakness and chills earlier today and was sent to the ED after she was noted to be hypotensive with BP 80/40. Patient denies fever, SOB, palpitations, changes to her bowel or urinary habits, abdominal pain, urinary symptoms or any other acute complaints.    In ED, /71, HR 86 (13 Oct 2020 17:53)    Interval history: under ICU care for septic shock, on pressor support, worsening mental status, coagulopathic. Full code on file.       PAST MEDICAL & SURGICAL HISTORY:  Anasarca    Pulmonary embolism    DVT (deep venous thrombosis)    SBO (small bowel obstruction)    H/O exploratory laparotomy        SOCIAL HISTORY:  has 2 children, lives w daughter, son is in [facility], has grandchildren  Admitted from:  home         Surrogate/HCP/Guardian:    Jus Bucio daughter      Phone#: [phone]    FAMILY HISTORY:  No pertinent family history in first degree relatives      Baseline ADLs (prior to admission): alert, ambulatory short distances    Allergies    No Known Allergies    Intolerances      Present Symptoms:           Review of Systems:   Unable to obtain due to poor mentation     MEDICATIONS  (STANDING):  chlorhexidine 2% Cloths 1 Application(s) Topical daily  lactated ringers. 1000 milliLiter(s) (75 mL/Hr) IV Continuous <Continuous>  lactulose Syrup 30 Gram(s) Oral every 6 hours  meropenem  IVPB 1000 milliGRAM(s) IV Intermittent every 8 hours  midodrine. 10 milliGRAM(s) Oral three times a day  mupirocin 2% Nasal 1 Application(s) Both Nostrils two times a day  norepinephrine Infusion 0.05 MICROgram(s)/kG/Min (2.63 mL/Hr) IV Continuous <Continuous>  nystatin Powder 1 Application(s) Topical two times a day  pantoprazole  Injectable 40 milliGRAM(s) IV Push two times a day  rifAXIMin 550 milliGRAM(s) Oral two times a day    MEDICATIONS  (PRN):  sodium chloride 0.9% lock flush 10 milliLiter(s) IV Push every 1 hour PRN Pre/post blood products, medications, blood draw, and to maintain line patency      PHYSICAL EXAM:    Vital Signs Last 24 Hrs  T(C): 36.1 (23 Oct 2020 12:00), Max: 36.4 (23 Oct 2020 10:00)  T(F): 97 (23 Oct 2020 12:00), Max: 97.6 (23 Oct 2020 10:00)  HR: 86 (23 Oct 2020 16:00) (82 - 125)  BP: 103/74 (23 Oct 2020 16:00) (82/65 - 143/108)  BP(mean): 81 (23 Oct 2020 16:00) (64 - 117)  RR: 23 (23 Oct 2020 16:00) (18 - 37)  SpO2: 97% (23 Oct 2020 16:00) (91% - 100%)     Karnofsky Performance Score/Palliative Performance Status Version2:   20  %     GENERAL: lethargic, ill appearing, appears older than stated age, cachectic, NAD  HEENT: Atraumatic, oropharynx clear, NGT in place, crusted blood on lips, nares, neck supple  CHEST/LUNG: unlabored  HEART: Regular rate and rhythm    ABDOMEN: Soft, Nontender, Nondistended   MUSCULOSKELETAL:  No  edema   NERVOUS SYSTEM:   lethargic, not following commands, wrist restraints in place  SKIN: No rashes or lesions noted  Oral intake: npo       LABS:                        8.6    5.63  )-----------( 98       ( 23 Oct 2020 11:54 )             26.2     10-23    147<H>  |  120<H>  |  10  ----------------------------<  90  4.2   |  17<L>  |  0.94    Ca    7.7<L>      23 Oct 2020 06:23  Phos  2.0     10-23  Mg     2.3     10-23    TPro  4.7<L>  /  Alb  2.8<L>  /  TBili  3.0<H>  /  DBili  x   /  AST  248<H>  /  ALT  105<H>  /  AlkPhos  123<H>  10-23        RADIOLOGY & ADDITIONAL STUDIES:    c< from: CT Abdomen and Pelvis w/ Oral Cont and w/ IV Cont (10.21.20 @ 15:59) >  EXAM:  CT ABDOMEN AND PELVIS OC IC                          EXAM:  CT CHEST IC                            *** ADDENDUM 10/21/2020  ***    Upon further review, there is apparent mild luminal narrowing at the distal small bowel, probably due to muralthickening (image 83 and 100 series 2). This may be represent the transition point for the differential consideration of small bowel obstruction. Ileus is still in the differential consideration because oral contrast has reached the terminal ileum. Dilatation of the proximal ascending colon measuring 7.9 cm in diameter.    Upon further review, there is a mildly enlarged 1.2 cm peripancreatic lymph node.    Case discussed with Dr. Dena.    *** END OF ADDENDUM 10/21/2020  ***      PROCEDURE DATE:  10/21/2020          INTERPRETATION:  CT of the chest, abdomen, and pelvis with IV contrast    Indication: Shortness of breath. Clinical concern for bowel obstruction. Sepsis.    Technique: Axial multidetector CT images of the chest, abdomen, and pelvisare acquired following the administration of oral contrast and IV contrast (90 cc Omnipaque-350 administered, 10 cc discarded).    Comparison: No prior chest CT is available for comparison. Abdominal/pelvic CT dated 10/13/2020.    Findings:    Chest:Mild bilateral pleural effusions. No pericardial effusion. The heart is not enlarged. Aortic calcifications are present. No evidence for aortic dissection. Ectasia of the ascending aorta at 3.8 cm in diameter. Retroesophageal right aberrant subclavian artery. Possible 1.0 cm hypodense lesion in the left lobe of the thyroid. Thyroid ultrasound may be pursued for further evaluation. No enlarged mediastinal, hilar, or axillary lymph node.    The trachea and central bronchi are patent.    No evidenceof pneumothorax. Combination of atelectasis and consolidation in the left lower lobe. Mild atelectasis in other lung fields bilaterally. Small calcified granuloma in the lingula. 5 mm nonspecific noncalcified left upper lobe lung nodule (image 59 series 3); if the patient's is in the high risk category (i.e. smoker), follow-up chest CT may be pursued in 12 months to ensure stability.    Abdomen/pelvis: Respiratory motion artifact limits evaluation. Gallstones are again seen in the gallbladder. Evaluation of the gallbladder wall is limited by respiratory motion artifact. The liver, common bile duct, pancreas, spleen, adrenals and left kidney appear unremarkable. Small hypodense lesion in the right kidney, too small to characterize.    The appendix appears unremarkable. Mural thickening of the left colon and rectum. Liquid stool in the colon. Oral contrast has reached the terminal ileum. Apparent segmental mural thickening of the mid to distal small bowel and the proximal duodenum. Dilatation of the mid small bowel is again noted.    No evidence for free air or enlarged lymph node. Moderate to large ascites in the abdomen, increased since the previous examination.    The celiac axis artery, SMA, and KINA are patent without stenosis. IVC filter is again noted.    Cain catheter in the urinary bladder. The uterus appears unremarkable.    Skeleton: Aging determinate compression fracture at T5 vertebra. Degenerative spondylosis.    Impression:    Mural thickening of the left colon and rectum. Liquid stool in the colon. Apparent segmental mural thickening of the mid to distal small bowel and the proximal duodenum. Findings may represent nonspecific enterocolitis, noninfectious inflammatory bowel disease, or ischemic bowel. Clinical correlation is recommended. The celiac axis artery, SMA, and KINA are patent without stenosis.    Dilatation of the mid small bowel is again noted. Oral contrast has reached the terminal ileum. Findings may represent small bowel obstruction, or ileus related to nonspecific enterocolitis.    Cholelithiasis.    Moderate to large ascites in the abdomen, increased since the previous examination.    5 mm nonspecific noncalcified left upper lobe lung nodule; if the patient's is in the high risk category (i.e. smoker), follow-up chest CT may be pursued in 12 months to ensure stability.    Combination of atelectasis and consolidation in the left lower lobe.    Possible 1.0 cm hypodense lesion in the left lobe of the thyroid. Thyroid ultrasound may be pursued for further evaluation.    Mild bilateral pleural effusions.    Aging determinate compression fracture at T5 vertebra.    ***Please see the addendum at the top of this report. It may contain additional important information or changes.****        YING SORIA M.D., ATTENDING RADIOLOGIST  This document has been electronically signed. Oct 21 2020  5:27PM  Addend:YING SORIA M.D., ATTENDING RADIOLOGIST  This addendum was electronically signed on: Oct 21 2020  5:57PM.    < end of copied text >      ADVANCE DIRECTIVES:

## 2020-10-23 NOTE — PROGRESS NOTE ADULT - ASSESSMENT
65y Female from home, lives with daughter, ambulates with walker with PMH of recurrent SBO's, s/p exp lap, SB resection in 2015, ex lap, ALBINA in 2018, DVT, PE, on Xarelto, IVC filter, chronic leg swelling, and anasarca, came in to the ED due to hypotension during office visit. Patient was found to be bacteremic with bacteroids fragilis. Admitted in ICU due to hypotension and hypothermia. On pressors meropenem , midodrine and Solu-cortef.  BCx X2 negative.     Diagnosis:  >Sepsis  >Bacteremia  >Anemia  >DVT/PE  >Transaminitis    --------------------------------------------Neuro--------------------------------------  -She is AAOx2 at baseline on evaluation.   -Carl huddleston for hypothermia      -------------------------------------CVS-------------------------------  -Patient is hypotensive and hypothermic secondary to sepsis.  -c/w with meropenem   - On clear liquid diet, advance the diet as tolerated  -started on albumin, given albumin level of 1.6  -Midodrine 10 mg TID  -Hold Lasix home med in the setting of hypotension  -CT showed mild b/l pleural effusion  -Pt has pitting edema of lower legs but no signs of pulmonary edema.     --------------------------------Respiratory----------------------  -Pt is tachypneic but not in distress  -RIJ for pressors    ----------------------------------------Gastrointestinal--------------------------------------  -Patient was bacteremic with bacteroids fragilis, likely secondary from intra abdominal source.  -Patient had multiple SBO in past. Ct abdomen on admission showed ileus, Repeat CT A/P showed ileus and non occlusive ischemic colitis  -No signs of acute abdomen , will start on clear liquid diet.  -LFTs are mildly elevated since admission, trending down. Likely due to sepsis.  -C/w meropenem  -f/u ammonia  -BCx X 2 negative.  -GI, DR Chatterjee deferred doing colonoscopy due to multiple comorbidities    -----------------------------------------ID---------------------------------------  -Patient is bacteremic with bacteroids likely Gi source.  -Lactate is trending down , today 3.3  -c/w meropenem  -Will repeat culture  - BP on the lower side, pt is on Levophed midodrine and solu cortef  -Monitor vitals Q4      ---------------------------------------------Nephro-------------------------------------  - Kidney function is normal, Cl is 119 with normal sodium and potassium  -On clear liquid diet  -Monitor electrolytes  -Monitor respiratory status    ----------------------------------------Hem Onc----------------------------------  -Has h/o DVt and PE in past. Patient is on Xarelto  -Patient has anemia , hgb of 7.3 , f/u CBC and anemia panel in afternoon  -Folate and B12 normal.     ----------------------------------------Endo--------------------------------  -HgbA1c 4    ----------------------------------------Prophylaxis----------------------------  DVT: Lovenox  GI: PPI    ---------------------------------------GOC---------------------------  Full code       65y Female from home, lives with daughter, ambulates with walker with PMH of recurrent SBO's, s/p exp lap, SB resection in 2015, ex lap, ALBINA in 2018, DVT, PE, on Xarelto, IVC filter, chronic leg swelling, and anasarca, came in to the ED due to hypotension during office visit. Patient was found to be bacteremic with bacteroids fragilis. Admitted in ICU due to hypotension and hypothermia. On pressors meropenem , midodrine and Solu-cortef.  BCx X2 negative.     Diagnosis:  >Encephalopathy  >Sepsis  >Bacteremia  >Anemia  >DVT/PE  >Transaminitis    --------------------------------------------Neuro--------------------------------------  -She is AAOx2 at baseline on evaluation.  -Encephalopathic since yesterday evening  -Ammonia trending up  -Started on lactulose 30g q 6h and rifaximin   -Carl huddleston for hypothermia  -f/u ammonia, cbc and PT/INR and APTT      -------------------------------------CVS-------------------------------  -Patient is hypotensive and hypothermic secondary to sepsis.  -c/w with meropenem   - NG tube feeding   -a/p albumin  -Midodrine 10 mg TID  -RI for pressors  -Hold Lasix home med in the setting of hypotension      --------------------------------Respiratory----------------------  -Pt is tachypneic but not in distress  -CT showed mild b/l pleural effusion and left lower lobe consolidation  -c/w meropenem    ----------------------------------------Gastrointestinal--------------------------------------  -Patient was bacteremic with bacteroids fragilis, likely secondary from intra abdominal source.  -Patient had multiple SBO in past. 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 Likely due to sepsis.  -C/w meropenem  -BCx X 2 negative.  -ammonia 125,on lactulose and rifaximin   -f/u ammonia  -GI, DR Chatterjee deferred doing colonoscopy due to multiple comorbidities  -Dr Potts has been consulted    -----------------------------------------ID---------------------------------------  -Patient is bacteremic with bacteroids likely Gi source.  -Lactate is normal 1.3  -c/w meropenem  - BP on the lower side, pt is on Levophed midodrine and solu cortef  -Monitor vitals Q4      ---------------------------------------------Nephro-------------------------------------  -Kidney function is normal, Cl is 120 with  sodium 147  -Monitor electrolytes  -Monitor respiratory status    ----------------------------------------Hem Onc----------------------------------  -Has h/o DVt and PE in past. Patient is on Xarelto  -Patient has anemia , hgb 8.7 holding ,s/p 1 PRBC  -anemia of chronic disease  -Folate and B12 normal.     ----------------------------------------Endo--------------------------------  -HgbA1c 4    ----------------------------------------Prophylaxis----------------------------  DVT: Lovenox  GI: PPI    ---------------------------------------GOC---------------------------  Full code

## 2020-10-23 NOTE — PROGRESS NOTE ADULT - ASSESSMENT
65y Female from home, lives with daughter, ambulates with walker with PMH of recurrent SBO's, s/p exp lap, SB resection in 2015, ex lap, ALBINA in 2018, DVT, PE, on Xarelto, IVC filter, chronic leg swelling, and anasarca, came in to the ED due to hypotension during office visit. Patient was found to be bacteremic with bacteroids fragilis. Admitted in ICU due to hypotension and hypothermia. On pressors meropenem , midodrine and Solu-cortef.  BCx X2 negative.     Diagnosis:  >Encephalopathy  >Sepsis  >Bacteremia  >Anemia  >DVT/PE  >Transaminitis    --------------------------------------------Neuro--------------------------------------  -She is AAOx2 at baseline on evaluation.  -Encephalopathic since yesterday evening  -Ammonia trending up  -Started on lactulose 30g q 6h and rifaximin   -Carl huddleston for hypothermia  -f/u ammonia, cbc and PT/INR and APTT      -------------------------------------CVS-------------------------------  -Patient is hypotensive and hypothermic secondary to sepsis.  -c/w with meropenem   - NG tube feeding   -a/p albumin  -Midodrine 10 mg TID  -RI for pressors  -Hold Lasix home med in the setting of hypotension      --------------------------------Respiratory----------------------  -Pt is tachypneic but not in distress  -CT showed mild b/l pleural effusion and left lower lobe consolidation  -c/w meropenem    ----------------------------------------Gastrointestinal--------------------------------------  -Patient was bacteremic with bacteroids fragilis, likely secondary from intra abdominal source.  -Patient had multiple SBO in past. 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 Likely due to sepsis.  -C/w meropenem  -BCx X 2 negative.  -ammonia 125,on lactulose and rifaximin   -f/u ammonia  -GI, DR Chatterjee deferred doing colonoscopy due to multiple comorbidities  -Dr Potts has been consulted    -----------------------------------------ID---------------------------------------  -Patient is bacteremic with bacteroids likely Gi source.  -Lactate is normal 1.3  -c/w meropenem  - BP on the lower side, pt is on Levophed midodrine and solu cortef  -Monitor vitals Q4      ---------------------------------------------Nephro-------------------------------------  -Kidney function is normal, Cl is 120 with  sodium 147  -Monitor electrolytes  -Monitor respiratory status    ----------------------------------------Hem Onc----------------------------------  -Has h/o DVt and PE in past. Patient is on Xarelto  -Patient has anemia , hgb 8.7 holding ,s/p 1 PRBC  -anemia of chronic disease  -Folate and B12 normal.     ----------------------------------------Endo--------------------------------  -HgbA1c 4    ----------------------------------------Prophylaxis----------------------------  DVT: Lovenox  GI: PPI    ---------------------------------------GOC---------------------------  Full code    MADIE AS PER ICU

## 2020-10-24 NOTE — CHART NOTE - NSCHARTNOTEFT_GEN_A_CORE
An extensive goals of care conversation was held including options, risks and benefits of medical treatments including CPR, intubation, and tube feeding. As per patient best interests, HCP Andrew Bucio- daughter  made patient DNR. A MOLST has been completed and placed in chart to this effect.

## 2020-10-24 NOTE — PROGRESS NOTE ADULT - ASSESSMENT
65y Female from home, lives with daughter, ambulates with walker with PMH of recurrent SBO's, s/p exp lap, SB resection in 2015, ex lap, ALBINA in 2018, DVT, PE, on Xarelto, IVC filter, chronic leg swelling, and anasarca, came in to the ED due to hypotension during office visit. Patient was found to be bacteremic with bacteroids fragilis. Admitted in ICU due to hypotension and hypothermia. On pressors meropenem , midodrine and Solu-cortef.  BCx X2 negative.     Diagnosis:  >Encephalopathy  >Sepsis  >Bacteremia  >Anemia  >DVT/PE  >Transaminitis    --------------------------------------------Neuro--------------------------------------  -She is AAOx2 at baseline on evaluation.  -Encephalopathic since yesterday evening  -Ammonia trending up  -Started on lactulose 30g q 6h and rifaximin   -Carl huddleston for hypothermia  -f/u ammonia, cbc and PT/INR and APTT      -------------------------------------CVS-------------------------------  -Patient is hypotensive and hypothermic secondary to sepsis.  -c/w with meropenem   - NG tube feeding   -a/p albumin  -Midodrine 10 mg TID  -RI for pressors  -Hold Lasix home med in the setting of hypotension      --------------------------------Respiratory----------------------  -Pt is tachypneic but not in distress  -CT showed mild b/l pleural effusion and left lower lobe consolidation  -c/w meropenem    ----------------------------------------Gastrointestinal--------------------------------------  -Patient was bacteremic with bacteroids fragilis, likely secondary from intra abdominal source.  -Patient had multiple SBO in past. 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 Likely due to sepsis.  -C/w meropenem  -BCx X 2 negative.  -ammonia 125,on lactulose and rifaximin   -f/u ammonia  -GI, DR Chatterjee deferred doing colonoscopy due to multiple comorbidities  -Dr Potts has been consulted    -----------------------------------------ID---------------------------------------  -Patient is bacteremic with bacteroids likely Gi source.  -Lactate is normal 1.3  -c/w meropenem  - BP on the lower side, pt is on Levophed midodrine and solu cortef  -Monitor vitals Q4      ---------------------------------------------Nephro-------------------------------------  -Kidney function is normal, Cl is 120 with  sodium 147  -Monitor electrolytes  -Monitor respiratory status    ----------------------------------------Hem Onc----------------------------------  -Has h/o DVt and PE in past. Patient is on Xarelto  -Patient has anemia , hgb 8.7 holding ,s/p 1 PRBC  -anemia of chronic disease  -Folate and B12 normal.     ----------------------------------------Endo--------------------------------  -HgbA1c 4    ----------------------------------------Prophylaxis----------------------------  DVT: Lovenox  GI: PPI    ---------------------------------------GOC---------------------------  Full code

## 2020-10-24 NOTE — PROGRESS NOTE ADULT - ASSESSMENT
Patient is a 65y old  Female from home, lives with daughter, ambulates with walker with PMH of recurrent SBO's, s/p exp lap, SB resection in 2015, ex lap, ALBINA in 2018, DVT, PE, on Xarelto, IVC filter, chronic leg swelling, and anasarca, Now send in to the ER by her PCP, Dr. Ross, for evaluation of  generalized weakness and hypotension BP of 80/40 during office visit. On admission, she found to have no fever and BP was in lower normal range and no Leukocytosis. The CXR is clear and CT abd/pelvis consistent with Ileus. The ID consult requested to assist with evaluation of infectious etiology of episode of hypotension. Found to have Bacteroides bacteremia and now developed septic shock on Cefepime and Flagyl. Hence transferred to ICU. 10/20/20.    # Hypotension  at office- BP of 80/40 - resolved   #  Bacteroides fragilis Bacteremia - 10/13/20 - ? source most likely GI- Repeat Blood Cxs have NGTD 10/16/20  # Ileus  - h/o recurrent SBO and s/p EXp. LAp  # COVID 19 negative   # Septic shock ( Hypothermia + hypotensive)- transferred to ICU since requiring pressor- source GI, The CT abd/pelvis shows nonspecific enterocolitis, noninfectious inflammatory bowel disease, or ischemic bowel, along with ileus.       would recommend:    1. Aspiration precaution  2. Continue Lactulose  and monitor Ammonia level   3. Continue Meropenem for now  4. Monitor kidney function and adjust Abx doses accordingly   5. Management of Ileus and ischemic colitis as per ICU protocol    d/w ICU team       Attending Attestation:    Spent more than 45 minutes on total encounter, more than 50 % of the visit was spent counseling and/or coordinating care by the Attending physician. Patient is a 65y old  Female from home, lives with daughter, ambulates with walker with PMH of recurrent SBO's, s/p exp lap, SB resection in 2015, ex lap, ALBINA in 2018, DVT, PE, on Xarelto, IVC filter, chronic leg swelling, and anasarca, Now send in to the ER by her PCP, Dr. Ross, for evaluation of  generalized weakness and hypotension BP of 80/40 during office visit. On admission, she found to have no fever and BP was in lower normal range and no Leukocytosis. The CXR is clear and CT abd/pelvis consistent with Ileus. The ID consult requested to assist with evaluation of infectious etiology of episode of hypotension. Found to have Bacteroides bacteremia and now developed septic shock on Cefepime and Flagyl. Hence transferred to ICU. 10/20/20.    # Hypotension  at office- BP of 80/40 - resolved   #  Bacteroides fragilis Bacteremia - 10/13/20 - ? source most likely GI- Repeat Blood Cxs have NGTD 10/16/20  # Ileus  - h/o recurrent SBO and s/p EXp. LAp  # COVID 19 negative   # Septic shock ( Hypothermia + hypotensive)- transferred to ICU since requiring pressor- source GI, The CT abd/pelvis shows nonspecific enterocolitis, noninfectious inflammatory bowel disease, or ischemic bowel, along with ileus.   # Pneumonia- HCAP vs Aspiration       would recommend:    1. Please  obtain Sputum culture, deep suctioned   2. Aspiration precaution  3. Continue Lactulose  and monitor Ammonia level   4. Continue Meropenem for now  5. Monitor kidney function and adjust Abx doses accordingly   6. Management of Vent as per ICU protocol    d/w ICU team       Attending Attestation:    Spent more than 45 minutes on total encounter, more than 50 % of the visit was spent counseling and/or coordinating care by the Attending physician. Patient is a 65y old  Female from home, lives with daughter, ambulates with walker with PMH of recurrent SBO's, s/p exp lap, SB resection in 2015, ex lap, ALBINA in 2018, DVT, PE, on Xarelto, IVC filter, chronic leg swelling, and anasarca, Now send in to the ER by her PCP, Dr. Ross, for evaluation of  generalized weakness and hypotension BP of 80/40 during office visit. On admission, she found to have no fever and BP was in lower normal range and no Leukocytosis. The CXR is clear and CT abd/pelvis consistent with Ileus. The ID consult requested to assist with evaluation of infectious etiology of episode of hypotension. Found to have Bacteroides bacteremia and now developed septic shock on Cefepime and Flagyl. Hence transferred to ICU. 10/20/20.    # Hypotension  at office- BP of 80/40 - resolved   #  Bacteroides fragilis Bacteremia - 10/13/20 - ? source most likely GI- Repeat Blood Cxs have NGTD 10/16/20  # Ileus  - h/o recurrent SBO and s/p EXp. LAp  # COVID 19 negative   # Septic shock ( Hypothermia + hypotensive)- transferred to ICU since requiring pressor- source GI, The CT abd/pelvis shows nonspecific enterocolitis, noninfectious inflammatory bowel disease, or ischemic bowel, along with ileus.   # Pneumonia- HCAP vs Aspiration - on CXR 10/24 and CT chest 10/21      would recommend:    1. Please  obtain Sputum culture, deep suctioned   2. Aspiration precaution  3. Continue Lactulose  and monitor Ammonia level   4. Continue Meropenem for now  5. Monitor kidney function and adjust Abx doses accordingly   6. Management of Vent as per ICU protocol    d/w ICU team       Attending Attestation:    Spent more than 45 minutes on total encounter, more than 50 % of the visit was spent counseling and/or coordinating care by the Attending physician.

## 2020-10-24 NOTE — PROGRESS NOTE ADULT - SUBJECTIVE AND OBJECTIVE BOX
Patient was seen and examined  Patient is a 65y old  Female who presents with a chief complaint of Hypotension (24 Oct 2020 03:15)      INTERVAL HPI/OVERNIGHT EVENTS:  T(C): 32.8 (10-24-20 @ 10:00), Max: 35.6 (10-24-20 @ 04:00)  HR: 118 (10-24-20 @ 12:44) (71 - 118)  BP: 71/54 (10-24-20 @ 12:30) (71/54 - 129/86)  RR: 21 (10-24-20 @ 12:44) (18 - 35)  SpO2: 100% (10-24-20 @ 12:44) (52% - 100%)  Wt(kg): --  I&O's Summary    23 Oct 2020 07:01  -  24 Oct 2020 07:00  --------------------------------------------------------  IN: 1950 mL / OUT: 125 mL / NET: 1825 mL    24 Oct 2020 07:01  -  24 Oct 2020 13:42  --------------------------------------------------------  IN: 611.6 mL / OUT: 0 mL / NET: 611.6 mL        LABS:                        7.7    4.08  )-----------( 83       ( 24 Oct 2020 06:16 )             24.3     10-24    150<H>  |  125<H>  |  11  ----------------------------<  149<H>  4.0   |  18<L>  |  0.96    Ca    8.2<L>      24 Oct 2020 06:16  Phos  2.7     10-24  Mg     2.3     10-24    TPro  4.8<L>  /  Alb  2.8<L>  /  TBili  2.6<H>  /  DBili  x   /  AST  188<H>  /  ALT  105<H>  /  AlkPhos  112  10-24    PT/INR - ( 24 Oct 2020 06:16 )   PT: 46.8 sec;   INR: 4.21 ratio         PTT - ( 24 Oct 2020 06:16 )  PTT:65.8 sec    CAPILLARY BLOOD GLUCOSE      POCT Blood Glucose.: 107 mg/dL (24 Oct 2020 10:52)  POCT Blood Glucose.: 148 mg/dL (24 Oct 2020 05:31)  POCT Blood Glucose.: 164 mg/dL (24 Oct 2020 00:03)  POCT Blood Glucose.: 101 mg/dL (23 Oct 2020 17:49)        ABG - ( 24 Oct 2020 12:21 )  pH, Arterial: 6.97  pH, Blood: x     /  pCO2: 83    /  pO2: 40    / HCO3: 18    / Base Excess: -13.8 /  SaO2: 56                    MEDICATIONS  (STANDING):  chlorhexidine 0.12% Liquid 15 milliLiter(s) Oral Mucosa every 12 hours  chlorhexidine 2% Cloths 1 Application(s) Topical daily  dextrose 5% + sodium chloride 0.45%. 1000 milliLiter(s) (75 mL/Hr) IV Continuous <Continuous>  lactulose Syrup 30 Gram(s) Oral every 6 hours  meropenem  IVPB 1000 milliGRAM(s) IV Intermittent every 8 hours  midodrine. 10 milliGRAM(s) Oral three times a day  mupirocin 2% Nasal 1 Application(s) Both Nostrils two times a day  norepinephrine Infusion 0.05 MICROgram(s)/kG/Min (2.63 mL/Hr) IV Continuous <Continuous>  nystatin Powder 1 Application(s) Topical two times a day  pantoprazole  Injectable 40 milliGRAM(s) IV Push two times a day  rifAXIMin 550 milliGRAM(s) Oral two times a day    MEDICATIONS  (PRN):  sodium chloride 0.9% lock flush 10 milliLiter(s) IV Push every 1 hour PRN Pre/post blood products, medications, blood draw, and to maintain line patency      RADIOLOGY & ADDITIONAL TESTS:    Imaging Personally Reviewed:  [ ] YES  [ ] NO    REVIEW OF SYSTEMS:  unable   ALLERY AND IMMUNOLOGIC: No hives or eczema      Consultant(s) Notes Reviewed:  [ ] YES  [ ] NO    PHYSICAL EXAM:  GENERAL: NAD, well-groomed, well-developed  HEAD:  Atraumatic, Normocephalic  EYES: EOMI, PERRLA, conjunctiva and sclera clear  ENMT: No tonsillar erythema, exudates, or enlargement; Moist mucous membranes, Good dentition, No lesions  NECK: Supple, No JVD, Normal thyroid  NERVOUS SYSTEM:  on vent   CHEST/LUNG: bl rhonchi   HEART: Regular rate and rhythm; No murmurs, rubs, or gallops  ABDOMEN: Soft, Nontender, Nondistended; Bowel sounds present  EXTREMITIES:  2+ Peripheral Pulses, No clubbing, cyanosis, or edema  LYMPH: No lymphadenopathy noted  SKIN: No rashes or lesions    Care Discussed with Consultants/Other Providers [ x] YES  [ ] NO

## 2020-10-24 NOTE — PROCEDURE NOTE - NSPROCDETAILS_GEN_ALL_CORE
patient pre-oxygenated, tube inserted, placement confirmed/connected to ventilator
sterile dressing applied/ultrasound guidance/guidewire recovered/sterile technique, catheter placed/lumen(s) aspirated and flushed

## 2020-10-24 NOTE — PROGRESS NOTE ADULT - ASSESSMENT
AS PER ICU       65y Female from home, lives with daughter, ambulates with walker with PMH of recurrent SBO's, s/p exp lap, SB resection in 2015, ex lap, ALBINA in 2018, DVT, PE, on Xarelto, IVC filter, chronic leg swelling, and anasarca, came in to the ED due to hypotension during office visit. Patient was found to be bacteremic with bacteroids fragilis. Admitted in ICU due to hypotension and hypothermia. On pressors meropenem , midodrine and Solu-cortef.  BCx X2 negative.     Diagnosis:  >Encephalopathy  >Sepsis  >Bacteremia  >Anemia  >DVT/PE  >Transaminitis    --------------------------------------------Neuro--------------------------------------  -She is AAOx2 at baseline on evaluation.  -Encephalopathic since yesterday evening  -Ammonia trending up  -Started on lactulose 30g q 6h and rifaximin   -Carl hagger for hypothermia  -f/u ammonia, cbc and PT/INR and APTT      -------------------------------------CVS-------------------------------  -Patient is hypotensive and hypothermic secondary to sepsis.  -c/w with meropenem   - NG tube feeding   -a/p albumin  -Midodrine 10 mg TID  -[facility] for pressors  -Hold Lasix home med in the setting of hypotension      --------------------------------Respiratory----------------------  -Pt is tachypneic but not in distress  -CT showed mild b/l pleural effusion and left lower lobe consolidation  -c/w meropenem    ----------------------------------------Gastrointestinal--------------------------------------  -Patient was bacteremic with bacteroids fragilis, likely secondary from intra abdominal source.  -Patient had multiple SBO in past. 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 Likely due to sepsis.  -C/w meropenem  -BCx X 2 negative.  -ammonia 125,on lactulose and rifaximin   -f/u ammonia  -GI, DR Chatterjee deferred doing colonoscopy due to multiple comorbidities  -Dr Potts has been consulted    -----------------------------------------ID---------------------------------------  -Patient is bacteremic with bacteroids likely Gi source.  -Lactate is normal 1.3  -c/w meropenem  - BP on the lower side, pt is on Levophed midodrine and solu cortef  -Monitor vitals Q4      ---------------------------------------------Nephro-------------------------------------  -Kidney function is normal, Cl is 120 with  sodium 147  -Monitor electrolytes  -Monitor respiratory status    ----------------------------------------Hem Onc----------------------------------  -Has h/o DVt and PE in past. Patient is on Xarelto  -Patient has anemia , hgb 8.7 holding ,s/p 1 PRBC  -anemia of chronic disease  -Folate and B12 normal.     ----------------------------------------Endo--------------------------------  -HgbA1c 4    ----------------------------------------Prophylaxis----------------------------  DVT: Lovenox  GI: PPI    ---------------------------------------GOC---------------------------  Full code

## 2020-10-24 NOTE — PROGRESS NOTE ADULT - SUBJECTIVE AND OBJECTIVE BOX
Chief Complaint:  Patient is a 65y old  Female who presents with a chief complaint of Hypotension (24 Oct 2020 19:09)      Reason for consult: Evaluate for Budd Chiari Interval Events:   Patient was seen and examined at bedside. Patient is now intubated.     Hospital Medications:  chlorhexidine 0.12% Liquid 15 milliLiter(s) Oral Mucosa every 12 hours  chlorhexidine 2% Cloths 1 Application(s) Topical daily  dextrose 5% + sodium chloride 0.45%. 1000 milliLiter(s) IV Continuous <Continuous>  lactulose Syrup 30 Gram(s) Oral every 6 hours  meropenem  IVPB 1000 milliGRAM(s) IV Intermittent every 8 hours  midodrine. 10 milliGRAM(s) Oral three times a day  mupirocin 2% Nasal 1 Application(s) Both Nostrils two times a day  norepinephrine Infusion 0.05 MICROgram(s)/kG/Min IV Continuous <Continuous>  nystatin Powder 1 Application(s) Topical two times a day  pantoprazole  Injectable 40 milliGRAM(s) IV Push two times a day  rifAXIMin 550 milliGRAM(s) Oral two times a day  sodium chloride 0.9% lock flush 10 milliLiter(s) IV Push every 1 hour PRN      ROS:   Unable to obtain due to patient condition    PHYSICAL EXAM:   Vital Signs:  Vital Signs Last 24 Hrs  T(C): 33.3 (24 Oct 2020 16:14), Max: 35.6 (24 Oct 2020 04:00)  T(F): 92 (24 Oct 2020 16:14), Max: 96 (24 Oct 2020 04:00)  HR: 83 (24 Oct 2020 18:00) (71 - 118)  BP: 81/66 (24 Oct 2020 18:00) (71/54 - 134/103)  BP(mean): 69 (24 Oct 2020 18:00) (57 - 111)  RR: 24 (24 Oct 2020 18:00) (20 - 35)  SpO2: 100% (24 Oct 2020 17:20) (52% - 100%)  Daily     Daily     GENERAL: no acute distress  NEURO: opens eyes to verbal stimuli  CHEST: intubated, on vent, b/l air entry  CARDIAC: regular rate, rhythm  ABDOMEN: soft, non-tender, non-distended, no rebound or guarding, BS+  EXTREMITIES: no edema      LABS: reviewed                        7.0    2.49  )-----------( 68       ( 24 Oct 2020 17:40 )             22.3     10-24    150<H>  |  125<H>  |  11  ----------------------------<  149<H>  4.0   |  18<L>  |  0.96    Ca    8.2<L>      24 Oct 2020 06:16  Phos  2.7     10-24  Mg     2.3     10-24    TPro  4.8<L>  /  Alb  2.8<L>  /  TBili  2.6<H>  /  DBili  x   /  AST  188<H>  /  ALT  105<H>  /  AlkPhos  112  10-24    LIVER FUNCTIONS - ( 24 Oct 2020 06:16 )  Alb: 2.8 g/dL / Pro: 4.8 g/dL / ALK PHOS: 112 U/L / ALT: 105 U/L DA / AST: 188 U/L / GGT: x             Interval Diagnostic Studies: see sunrise for full report

## 2020-10-24 NOTE — PROGRESS NOTE ADULT - ASSESSMENT
66 yo Female with Hx of recurrent SBO`s s/p exp lap, small bowel resection in 2015, ex lap, ALBINA in 2018, DVT, PE on Xarelto, IVC filter and s/p supra-hepatic IVC thrombosis/occlusion, anemia, anasarca was sent to ED by PCP for hypotension, generalized weakness and chills and was admitted for hypotension, r/p sepsis on 10/13/2020, found to have Bacteroides fragilis bacteremia, suspected GI source, was treated with cefepime + metronidazole, remained hypothermic, hypotensive, was transferred to ICU and switched to meropenem on 10/21. Repeat CT 10/21 suggested  non-specific enterocolitis, noninfectious inflammatory bowel disease or ischemic bowel along with ileus, and concern for SBO given mild luminal narrowing at distal small bowel. Patient remains hypotensive, hypothermic, requiring pressor support, septic. Liver enzymes are up-trending, along with bilirubin, possibly due to ongoing sepsis. Now intubated, and also concern for HCAP vs. aspiration.   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  (prior thrombosis workup?)  - No caudate lobe hypertrophy reported (present in 75% of cases), no macroregenerative nodules reported, characteristic pattern of parenchymal perfusion not described,, but study reported limited due to motion artifact   -Venography would be gold standard for diagnosis, d/w IR, if patient will be more stable (possibly outpatient), can be evaluated at [facility] for Dx and potential intervention, this time patient is septic on pressor support  - was already on full dose anticoagulation, was being held b/o coagulopathy     #Abnormal liver tests   - most likely due to ongoing sepsis (only mildly elevated or normal on admission and started to worsen when patient condition deteriorated)  - bilirubin mildly elevated but severe coagulopathy  - can check Factor VIII   # Ascites   - Dx paracentesis could be useful given ongoing sepsis (last 2 sets of BCx nl) but as d/w IR, no safe window based on last CT   (last was in 2/2019, when SAAG < 1.1 serum alb 1.0, fluid alb < 0.2, total prot < 1)     # Encephalopathy   - c/w lactulose and c/w rifaximin   - consider CT head (on full dose AC)    # Anemia, thrombocytopenia   - possibly malnutrition plus chronic GI loss  - hematology F/u  - FOBT pos, GI f/u    Will continue to follow  Thank you

## 2020-10-24 NOTE — PROGRESS NOTE ADULT - SUBJECTIVE AND OBJECTIVE BOX
INTERVAL HPI/OVERNIGHT EVENTS: Patient was very agitated whole night  PRESSORS: [ ] YES [ ] NO  WHICH:    ANTIBIOTICS:                      Antimicrobial:  meropenem  IVPB 1000 milliGRAM(s) IV Intermittent every 8 hours  rifAXIMin 550 milliGRAM(s) Oral two times a day    Cardiovascular:  midodrine. 10 milliGRAM(s) Oral three times a day  norepinephrine Infusion 0.05 MICROgram(s)/kG/Min IV Continuous <Continuous>    Pulmonary:    Hematalogic:    Other:  chlorhexidine 2% Cloths 1 Application(s) Topical daily  dextrose 5% + sodium chloride 0.45%. 1000 milliLiter(s) IV Continuous <Continuous>  lactated ringers. 1000 milliLiter(s) IV Continuous <Continuous>  lactulose Syrup 30 Gram(s) Oral every 6 hours  mupirocin 2% Nasal 1 Application(s) Both Nostrils two times a day  nystatin Powder 1 Application(s) Topical two times a day  pantoprazole  Injectable 40 milliGRAM(s) IV Push two times a day  sodium chloride 0.9% lock flush 10 milliLiter(s) IV Push every 1 hour PRN    chlorhexidine 2% Cloths 1 Application(s) Topical daily  dextrose 5% + sodium chloride 0.45%. 1000 milliLiter(s) IV Continuous <Continuous>  lactated ringers. 1000 milliLiter(s) IV Continuous <Continuous>  lactulose Syrup 30 Gram(s) Oral every 6 hours  meropenem  IVPB 1000 milliGRAM(s) IV Intermittent every 8 hours  midodrine. 10 milliGRAM(s) Oral three times a day  mupirocin 2% Nasal 1 Application(s) Both Nostrils two times a day  norepinephrine Infusion 0.05 MICROgram(s)/kG/Min IV Continuous <Continuous>  nystatin Powder 1 Application(s) Topical two times a day  pantoprazole  Injectable 40 milliGRAM(s) IV Push two times a day  rifAXIMin 550 milliGRAM(s) Oral two times a day  sodium chloride 0.9% lock flush 10 milliLiter(s) IV Push every 1 hour PRN    Drug Dosing Weight  Height (cm): 167.6 (21 Oct 2020 02:26)  Weight (kg): 56.2 (21 Oct 2020 02:26)  BMI (kg/m2): 20 (21 Oct 2020 02:26)  BSA (m2): 1.63 (21 Oct 2020 02:26)    CENTRAL LINE: [ ] YES [ ] NO  LOCATION:   DATE INSERTED:  REMOVE: [ ] YES [ ] NO  EXPLAIN:    TREJO: [ ] YES [ ] NO    DATE INSERTED:  REMOVE:  [ ] YES [ ] NO  EXPLAIN:    A-LINE:  [ ] YES [ ] NO  LOCATION:   DATE INSERTED:  REMOVE:  [ ] YES [ ] NO  EXPLAIN:    PMH -reviewed admission note, no change since admission    ICU Vital Signs Last 24 Hrs  T(C): 35.5 (24 Oct 2020 00:00), Max: 36.4 (23 Oct 2020 10:00)  T(F): 95.9 (24 Oct 2020 00:00), Max: 97.6 (23 Oct 2020 10:00)  HR: 75 (24 Oct 2020 03:00) (71 - 125)  BP: 91/75 (24 Oct 2020 03:00) (82/65 - 133/92)  BP(mean): 80 (24 Oct 2020 03:00) (64 - 102)  ABP: --  ABP(mean): --  RR: 27 (24 Oct 2020 03:00) (18 - 37)  SpO2: 95% (24 Oct 2020 03:00) (90% - 100%)      ABG - ( 22 Oct 2020 19:53 )  pH, Arterial: 7.38  pH, Blood: x     /  pCO2: 27    /  pO2: 83    / HCO3: 15    / Base Excess: -8.4  /  SaO2: 94                    10-22 @ 07:01  -  10-23 @ 07:00  --------------------------------------------------------  IN: 433.1 mL / OUT: 198 mL / NET: 235.1 mL            PHYSICAL EXAM:    GENERAL: NAD, NC 2 L, alert and oriented but not answering questions  HEAD:  Atraumatic, Normocephalic  EYES: icterus sclera, no conjunctivitis   ENMT:  some mucosal bleeding was noted on lips, no signs of active bleeding  NECK: Supple, normal appearance, No JVD; Normal thyroid; Trachea midline  NERVOUS SYSTEM:  Altered mental status,   CHEST/LUNG: No chest deformity; Normal percussion bilaterally; No rales, rhonchi, wheezing   HEART: Regular rate and rhythm; No murmurs, rubs, or gallops  ABDOMEN: Soft, Nontender, mildly distended, Bowel sounds present  EXTREMITIES:  2+ Peripheral Pulses, No clubbing, cyanosis, but pitting edema +ve  LYMPH: No lymphadenopathy noted  SKIN: No rashes or lesions; Good capillary refill      LABS:  CBC Full  -  ( 23 Oct 2020 11:54 )  WBC Count : 5.63 K/uL  RBC Count : 2.79 M/uL  Hemoglobin : 8.6 g/dL  Hematocrit : 26.2 %  Platelet Count - Automated : 98 K/uL  Mean Cell Volume : 93.9 fl  Mean Cell Hemoglobin : 30.8 pg  Mean Cell Hemoglobin Concentration : 32.8 gm/dL  Auto Neutrophil # : x  Auto Lymphocyte # : x  Auto Monocyte # : x  Auto Eosinophil # : x  Auto Basophil # : x  Auto Neutrophil % : x  Auto Lymphocyte % : x  Auto Monocyte % : x  Auto Eosinophil % : x  Auto Basophil % : x    10-23    147<H>  |  120<H>  |  10  ----------------------------<  90  4.2   |  17<L>  |  0.94    Ca    7.7<L>      23 Oct 2020 06:23  Phos  2.0     10-23  Mg     2.3     10-23    TPro  4.7<L>  /  Alb  2.8<L>  /  TBili  3.0<H>  /  DBili  x   /  AST  248<H>  /  ALT  105<H>  /  AlkPhos  123<H>  10-23    PT/INR - ( 23 Oct 2020 14:01 )   PT: See Note;   INR: See Note ratio         PTT - ( 23 Oct 2020 14:01 )  PTT:>200.0 sec        RADIOLOGY & ADDITIONAL STUDIES REVIEWED:  ***    GOALS OF CARE DISCUSSION WITH PATIENT/FAMILY/PROXY:    CRITICAL CARE TIME SPENT: 35 minutes INTERVAL HPI/OVERNIGHT EVENTS: Patient was very agitated whole night  PRESSORS: [ ] YES [ ] NO  WHICH:    ANTIBIOTICS:                      Antimicrobial:  meropenem  IVPB 1000 milliGRAM(s) IV Intermittent every 8 hours  rifAXIMin 550 milliGRAM(s) Oral two times a day    Cardiovascular:  midodrine. 10 milliGRAM(s) Oral three times a day  norepinephrine Infusion 0.05 MICROgram(s)/kG/Min IV Continuous <Continuous>    Pulmonary:    Hematalogic:    Other:  chlorhexidine 2% Cloths 1 Application(s) Topical daily  dextrose 5% + sodium chloride 0.45%. 1000 milliLiter(s) IV Continuous <Continuous>  lactated ringers. 1000 milliLiter(s) IV Continuous <Continuous>  lactulose Syrup 30 Gram(s) Oral every 6 hours  mupirocin 2% Nasal 1 Application(s) Both Nostrils two times a day  nystatin Powder 1 Application(s) Topical two times a day  pantoprazole  Injectable 40 milliGRAM(s) IV Push two times a day  sodium chloride 0.9% lock flush 10 milliLiter(s) IV Push every 1 hour PRN    chlorhexidine 2% Cloths 1 Application(s) Topical daily  dextrose 5% + sodium chloride 0.45%. 1000 milliLiter(s) IV Continuous <Continuous>  lactated ringers. 1000 milliLiter(s) IV Continuous <Continuous>  lactulose Syrup 30 Gram(s) Oral every 6 hours  meropenem  IVPB 1000 milliGRAM(s) IV Intermittent every 8 hours  midodrine. 10 milliGRAM(s) Oral three times a day  mupirocin 2% Nasal 1 Application(s) Both Nostrils two times a day  norepinephrine Infusion 0.05 MICROgram(s)/kG/Min IV Continuous <Continuous>  nystatin Powder 1 Application(s) Topical two times a day  pantoprazole  Injectable 40 milliGRAM(s) IV Push two times a day  rifAXIMin 550 milliGRAM(s) Oral two times a day  sodium chloride 0.9% lock flush 10 milliLiter(s) IV Push every 1 hour PRN    Drug Dosing Weight  Height (cm): 167.6 (21 Oct 2020 02:26)  Weight (kg): 56.2 (21 Oct 2020 02:26)  BMI (kg/m2): 20 (21 Oct 2020 02:26)  BSA (m2): 1.63 (21 Oct 2020 02:26)    CENTRAL LINE: [ ] YES [ ] NO  LOCATION:   DATE INSERTED:  REMOVE: [ ] YES [ ] NO  EXPLAIN:    TREJO: [x ] YES [ ] NO    DATE INSERTED:  REMOVE:  [ ] YES [ ] NO  EXPLAIN:    A-LINE:  [ ] YES [ ] NO  LOCATION:   DATE INSERTED:  REMOVE:  [ ] YES [ ] NO  EXPLAIN:    PMH -reviewed admission note, no change since admission    ICU Vital Signs Last 24 Hrs  T(C): 35.5 (24 Oct 2020 00:00), Max: 36.4 (23 Oct 2020 10:00)  T(F): 95.9 (24 Oct 2020 00:00), Max: 97.6 (23 Oct 2020 10:00)  HR: 75 (24 Oct 2020 03:00) (71 - 125)  BP: 91/75 (24 Oct 2020 03:00) (82/65 - 133/92)  BP(mean): 80 (24 Oct 2020 03:00) (64 - 102)  ABP: --  ABP(mean): --  RR: 27 (24 Oct 2020 03:00) (18 - 37)  SpO2: 95% (24 Oct 2020 03:00) (90% - 100%)      ABG - ( 22 Oct 2020 19:53 )  pH, Arterial: 7.38  pH, Blood: x     /  pCO2: 27    /  pO2: 83    / HCO3: 15    / Base Excess: -8.4  /  SaO2: 94                    10-22 @ 07:01  -  10-23 @ 07:00  --------------------------------------------------------  IN: 433.1 mL / OUT: 198 mL / NET: 235.1 mL            PHYSICAL EXAM:    GENERAL:  Thin frail black woman laying in bed   HEAD:  Atraumatic, Normocephalic  EYES: icterus sclera, no conjunctivitis   ENMT:  some mucosal bleeding was noted on lips, no signs of active bleeding  NECK: Supple, normal appearance, No JVD; Normal thyroid; Trachea midline  NERVOUS SYSTEM:  Altered mental status, / lethargic only respond to pain ful stimuli  CHEST/LUNG: No chest deformity; Normal percussion bilaterally; No rales, rhonchi, wheezing   HEART: Regular rate and rhythm; No murmurs, rubs, or gallops  ABDOMEN: Soft, Nontender, mildly distended, Bowel sounds present  EXTREMITIES:  2+ Peripheral Pulses, No clubbing, cyanosis, but pitting edema +ve  LYMPH: No lymphadenopathy noted  SKIN: No rashes or lesions; Good capillary refill      LABS:  CBC Full  -  ( 23 Oct 2020 11:54 )  WBC Count : 5.63 K/uL  RBC Count : 2.79 M/uL  Hemoglobin : 8.6 g/dL  Hematocrit : 26.2 %  Platelet Count - Automated : 98 K/uL  Mean Cell Volume : 93.9 fl  Mean Cell Hemoglobin : 30.8 pg  Mean Cell Hemoglobin Concentration : 32.8 gm/dL  Auto Neutrophil # : x  Auto Lymphocyte # : x  Auto Monocyte # : x  Auto Eosinophil # : x  Auto Basophil # : x  Auto Neutrophil % : x  Auto Lymphocyte % : x  Auto Monocyte % : x  Auto Eosinophil % : x  Auto Basophil % : x    10-23    147<H>  |  120<H>  |  10  ----------------------------<  90  4.2   |  17<L>  |  0.94    Ca    7.7<L>      23 Oct 2020 06:23  Phos  2.0     10-23  Mg     2.3     10-23    TPro  4.7<L>  /  Alb  2.8<L>  /  TBili  3.0<H>  /  DBili  x   /  AST  248<H>  /  ALT  105<H>  /  AlkPhos  123<H>  10-23    PT/INR - ( 23 Oct 2020 14:01 )   PT: See Note;   INR: See Note ratio         PTT - ( 23 Oct 2020 14:01 )  PTT:>200.0 sec        RADIOLOGY & ADDITIONAL STUDIES REVIEWED:  ***    GOALS OF CARE DISCUSSION WITH PATIENT/FAMILY/PROXY:    CRITICAL CARE TIME SPENT: 35 minutes

## 2020-10-25 NOTE — PROGRESS NOTE ADULT - SUBJECTIVE AND OBJECTIVE BOX
Patient was seen and examined at bedside. Full note to follow. Chief Complaint:  Patient is a 65y old  Female who presents with a chief complaint of Hypotension (25 Oct 2020 13:06)       [facility]:   Patient was seen and examined at bedside. Patient intubated. Unable to obtain ROS.    Hospital Medications:  chlorhexidine 0.12% Liquid 15 milliLiter(s) Oral Mucosa every 12 hours  chlorhexidine 2% Cloths 1 Application(s) Topical daily  fentaNYL   Infusion. 1 MICROgram(s)/kG/Hr IV Continuous <Continuous>  lactulose Syrup 30 Gram(s) Oral every 6 hours  meropenem  IVPB 1000 milliGRAM(s) IV Intermittent every 8 hours  midodrine. 10 milliGRAM(s) Oral three times a day  mupirocin 2% Nasal 1 Application(s) Both Nostrils two times a day  norepinephrine Infusion 0.05 MICROgram(s)/kG/Min IV Continuous <Continuous>  nystatin Powder 1 Application(s) Topical two times a day  pantoprazole  Injectable 40 milliGRAM(s) IV Push two times a day  polyethylene glycol 3350 17 Gram(s) Oral daily  rifAXIMin 550 milliGRAM(s) Oral two times a day  sodium chloride 0.9% lock flush 10 milliLiter(s) IV Push every 1 hour PRN        PHYSICAL EXAM:   Vital Signs:  Vital Signs Last 24 Hrs  T(C): 35.8 (25 Oct 2020 13:30), Max: 36.4 (24 Oct 2020 23:58)  T(F): 96.5 (25 Oct 2020 13:30), Max: 97.5 (24 Oct 2020 23:58)  HR: 100 (25 Oct 2020 14:30) (74 - 113)  BP: 89/72 (25 Oct 2020 14:30) (75/58 - 117/96)  BP(mean): 72 (25 Oct 2020 14:30) (62 - 87)  RR: 24 (25 Oct 2020 14:30) (14 - 30)  SpO2: 100% (25 Oct 2020 14:30) (87% - 100%)  Daily     Daily Weight in k.8 (25 Oct 2020 07:00)    GENERAL: no acute distress  NEURO: sedated  CHEST: intubated, on vent, b/l air entry  CARDIAC: regular rate, rhythm  ABDOMEN: soft, non-tender, non-distended, no rebound or guarding, BS+  EXTREMITIES: no edema  LABS: reviewed                        9.2    3.33  )-----------( 58       ( 25 Oct 2020 12:24 )             27.5     10-    152<H>  |  123<H>  |  11  ----------------------------<  88  3.4<L>   |  23  |  1.21    Ca    8.5      25 Oct 2020 12:24  Phos  1.9     10-25  Mg     2.2     10-25    TPro  4.9<L>  /  Alb  2.6<L>  /  TBili  2.6<H>  /  DBili  x   /  AST  133<H>  /  ALT  94<H>  /  AlkPhos  116  10-25    LIVER FUNCTIONS - ( 25 Oct 2020 06:25 )  Alb: 2.6 g/dL / Pro: 4.9 g/dL / ALK PHOS: 116 U/L / ALT: 94 U/L DA / AST: 133 U/L / GGT: x             Interval Diagnostic Studies: see sunrise for full report

## 2020-10-25 NOTE — PROGRESS NOTE ADULT - ASSESSMENT
Patient is a 65y old  Female from home, lives with daughter, ambulates with walker with PMH of recurrent SBO's, s/p exp lap, SB resection in 2015, ex lap, ALBINA in 2018, DVT, PE, on Xarelto, IVC filter, chronic leg swelling, and anasarca, Now send in to the ER by her PCP, Dr. Ross, for evaluation of  generalized weakness and hypotension BP of 80/40 during office visit. On admission, she found to have no fever and BP was in lower normal range and no Leukocytosis. The CXR is clear and CT abd/pelvis consistent with Ileus. The ID consult requested to assist with evaluation of infectious etiology of episode of hypotension. Found to have Bacteroides bacteremia and now developed septic shock on Cefepime and Flagyl. Hence transferred to ICU. 10/20/20.    # Hypotension  at office- BP of 80/40 - resolved   #  Bacteroides fragilis Bacteremia - 10/13/20 - ? source most likely GI- Repeat Blood Cxs have NGTD 10/16/20  # Ileus  - h/o recurrent SBO and s/p EXp. LAp  # COVID 19 negative   # Septic shock ( Hypothermia + hypotensive)- transferred to ICU since requiring pressor- source GI, The CT abd/pelvis shows nonspecific enterocolitis, noninfectious inflammatory bowel disease, or ischemic bowel, along with ileus.   # Pneumonia- HCAP vs Aspiration - on CXR 10/24 and CT chest 10/21      would recommend:    1.  Please ADD IV Vancomycin since MRSA PCR is positive and Sputum culture is pending collection.   2. Aspiration precaution  3. Monitor Temp. and c/w supportive care  4. Continue Meropenem for now  5. Monitor kidney function and adjust Abx doses accordingly   6. Management of Vent as per ICU protocol    d/w ICU team       Attending Attestation:    Spent more than 45 minutes on total encounter, more than 50 % of the visit was spent counseling and/or coordinating care by the Attending physician.

## 2020-10-25 NOTE — PROGRESS NOTE ADULT - ASSESSMENT
66 yo Female with Hx of recurrent SBO`s s/p exp lap, small bowel resection in 2015, ex lap, ALBINA in 2018, DVT, PE on Xarelto, IVC filter and s/p supra-hepatic IVC thrombosis/occlusion, anemia, anasarca was sent to ED by PCP for hypotension, generalized weakness and chills and was admitted for hypotension, r/p sepsis on 10/13/2020, found to have Bacteroides fragilis bacteremia, suspected GI source, was treated with cefepime + metronidazole, remained hypothermic, hypotensive, was transferred to ICU and switched to meropenem on 10/21. Repeat CT 10/21 suggested  non-specific enterocolitis, noninfectious inflammatory bowel disease or ischemic bowel along with ileus, and concern for SBO given mild luminal narrowing at distal small bowel. Patient remains hypotensive, hypothermic, requiring pressor support, septic. Liver enzymes are up-trending, along with bilirubin, possibly due to ongoing sepsis. Now intubated, and also concern for HCAP vs. aspiration.   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  (prior thrombosis workup?)  - No caudate lobe hypertrophy reported (present in 75% of cases), no macroregenerative nodules reported, characteristic pattern of parenchymal perfusion not described,, but study reported limited due to motion artifact   -Venography would be gold standard for diagnosis, d/w IR, if patient will be more stable (possibly outpatient), can be evaluated at [facility] for Dx and potential intervention, this time patient is septic on pressor support  - was already on full dose anticoagulation, was being held b/o coagulopathy     #Abnormal liver tests   - most likely due to ongoing sepsis (only mildly elevated or normal on admission and started to worsen when patient condition deteriorated)  - bilirubin mildly elevated but severe coagulopathy  - can check Factor VIII   - mgmt. of sepsis per ICU    # Ascites   - Dx paracentesis could be useful given ongoing sepsis (last 2 sets of BCx nl) but as d/w IR, no safe window based on last CT   (last was in 2/2019, when SAAG < 1.1 serum alb 1.0, fluid alb < 0.2, total prot < 1)     # Encephalopathy (even elevated ammonia can be multifactorial)  - c/w lactulose and c/w rifaximin   - consider CT head (on full dose AC)    # Anemia, thrombocytopenia   - possibly malnutrition plus chronic GI loss  - hematology F/u  - FOBT pos, GI f/u    Will continue to follow  Thank you

## 2020-10-25 NOTE — PROGRESS NOTE ADULT - ASSESSMENT
AS PER ICU     65y Female from home, lives with daughter, ambulates with walker with PMH of recurrent SBO's, s/p exp lap, SB resection in 2015, ex lap, ALBINA in 2018, DVT, PE, on Xarelto, IVC filter, chronic leg swelling, and anasarca, came in to the ED due to hypotension during office visit. Patient was found to be bacteremic with bacteroids fragilis. Admitted in ICU due to hypotension and hypothermia. On pressors meropenem , midodrine and Solu-cortef.  BCx X2 negative.     Diagnosis:  >Encephalopathy  >Sepsis  >Bacteremia  >Anemia  >DVT/PE  >Transaminitis  >Aspiration pneumonia vs HCAP    --------------------------------------------Neuro--------------------------------------  -She is AAOx2 at baseline on evaluation.  -Encephalopathic and got intubated ion 10/24  -Ammonia trending down from 152 ro 130.cont to trend  -Started on lactulose 30g q 6h and rifaximin   -Carl hagger for hypothermia  -f/u ammonia, cbc and PT/INR and APTT      -------------------------------------CVS-------------------------------  -Patient is hypotensive and hypothermic secondary to sepsis.  -c/w with meropenem   - NG tube feeding   -s/p albumin  -Midodrine 10 mg TID  -RIJ for pressors on levophed  -Hold Lasix home med in the setting of hypotension      --------------------------------Respiratory----------------------  -Pt is tachypneic but not in distress  -CT showed mild b/l pleural effusion and left lower lobe consolidation  -HCAP vs aspiration penumonia  -f/u sputum culture  -c/w meropenem  -ID Dr. Patricio    ----------------------------------------Gastrointestinal--------------------------------------  -Patient was bacteremic with bacteroids fragilis, likely secondary from intra abdominal source.  -Patient had multiple SBO in past. 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 Likely due to sepsis.  -C/w meropenem  -BCx X 2 negative.  -ammonia trends down from 150 to 130, cont to trend, on lactulose and rifaximin   -had 1 BM after enema and lactulose, monitor BM  -f/u ammonia  -GI, DR Chatterjee deferred doing colonoscopy due to multiple comorbidities, FOBT positive on 10/15  -Dr Potts recs has been noted    -----------------------------------------ID---------------------------------------  -Patient is bacteremic with bacteroids likely Gi source.  -Lactate is normal 1.3  -c/w meropenem  - BP on the lower side, pt is on Levophed midodrine  -Monitor vitals Q4      ---------------------------------------------Nephro-------------------------------------  -Kidney function is normal, Cl is 120 with  sodium trends up from 147 to 150  -cont with D5 and 1/2 NS  -Monitor electrolytes  -Monitor respiratory status    ----------------------------------------Hem Onc----------------------------------  -Has h/o DVt and PE in past. Patient is on Xarelto  -Patient has anemia  -anemia of chronic disease  -Hgb 6.9 1PRBC >8.7 >7 1PRBC> 8.5  -Plat trends up from 68 to 73  -Folate and B12 normal.   -Monitor CBC    ----------------------------------------Endo--------------------------------  -HgbA1c 4    ----------------------------------------Prophylaxis----------------------------  DVT: AC on hold  GI: PPI    ---------------------------------------GOC---------------------------  Full code

## 2020-10-25 NOTE — PROGRESS NOTE ADULT - ASSESSMENT
65y Female from home, lives with daughter, ambulates with walker with PMH of recurrent SBO's, s/p exp lap, SB resection in 2015, ex lap, ALBINA in 2018, DVT, PE, on Xarelto, IVC filter, chronic leg swelling, and anasarca, came in to the ED due to hypotension during office visit. Patient was found to be bacteremic with bacteroids fragilis. Admitted in ICU due to hypotension and hypothermia. On pressors meropenem , midodrine and Solu-cortef.  BCx X2 negative.     Diagnosis:  >Encephalopathy  >Sepsis  >Bacteremia  >Anemia  >DVT/PE  >Transaminitis  >Aspiration pneumonia vs HCAP    --------------------------------------------Neuro--------------------------------------  -She is AAOx2 at baseline on evaluation.  -Encephalopathic and got intubated ion 10/24  -Ammonia trending down from 152 ro 130.cont to trend  -Started on lactulose 30g q 6h and rifaximin   -Carl hagger for hypothermia  -f/u ammonia, cbc and PT/INR and APTT      -------------------------------------CVS-------------------------------  -Patient is hypotensive and hypothermic secondary to sepsis.  -c/w with meropenem   - NG tube feeding   -s/p albumin  -Midodrine 10 mg TID  -RIJ for pressors on levophed  -Hold Lasix home med in the setting of hypotension      --------------------------------Respiratory----------------------  -Pt is tachypneic but not in distress  -CT showed mild b/l pleural effusion and left lower lobe consolidation  -HCAP vs aspiration penumonia  -f/u sputum culture  -c/w meropenem  -ID Dr. Patricio    ----------------------------------------Gastrointestinal--------------------------------------  -Patient was bacteremic with bacteroids fragilis, likely secondary from intra abdominal source.  -Patient had multiple SBO in past. 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 Likely due to sepsis.  -C/w meropenem  -BCx X 2 negative.  -ammonia trends down from 150 to 130, cont to trend, on lactulose and rifaximin   -had 1 BM after enema and lactulose, monitor BM  -f/u ammonia  -GI, DR Chatterjee deferred doing colonoscopy due to multiple comorbidities, FOBT positive on 10/15  -Dr Katlyn aguilar has been noted    -----------------------------------------ID---------------------------------------  -Patient is bacteremic with bacteroids likely Gi source.  -Lactate is normal 1.3  -c/w meropenem  - BP on the lower side, pt is on Levophed midodrine  -Monitor vitals Q4      ---------------------------------------------Nephro-------------------------------------  -Kidney function is normal, Cl is 120 with  sodium trends up from 147 to 150  -cont with D5 and 1/2 NS  -Monitor electrolytes  -Monitor respiratory status    ----------------------------------------Hem Onc----------------------------------  -Has h/o DVt and PE in past. Patient is on Xarelto  -Patient has anemia  -anemia of chronic disease  -Hgb 6.9 1PRBC >8.7 >7 1PRBC> 8.5  -Plat trends up from 68 to 73  -Folate and B12 normal.   -Monitor CBC    ----------------------------------------Endo--------------------------------  -HgbA1c 4    ----------------------------------------Prophylaxis----------------------------  DVT: AC on hold  GI: PPI    ---------------------------------------GOC---------------------------  Full code       65y Female from home, lives with daughter, ambulates with walker with PMH of recurrent SBO's, s/p exp lap, SB resection in 2015, ex lap, ALBINA in 2018, DVT, PE, on Xarelto, IVC filter, chronic leg swelling, and anasarca, came in to the ED due to hypotension during office visit. Patient was found to be bacteremic with bacteroids fragilis. Admitted in ICU due to hypotension and hypothermia. On pressors meropenem , midodrine and Solu-cortef.  BCx X2 negative.     Diagnosis:  >Encephalopathy  >Sepsis  >Bacteremia  >Anemia  >DVT/PE  >Transaminitis  >Aspiration pneumonia vs HCAP    --------------------------------------------Neuro--------------------------------------  -She is AAOx2 at baseline on evaluation.  -Encephalopathic and got intubated ion 10/24  -Ammonia trending down from 152 > 130 > 116. Cont to trend   -On lactulose 30g q 6h and rifaximin   -Carl barrientos for hypothermia  -f/u ammonia, cbc and PT/INR and APTT      -------------------------------------CVS-------------------------------  -Patient is hypotensive and hypothermic secondary to sepsis.  -c/w with meropenem   - NG tube feeding   -s/p albumin  -Midodrine 10 mg TID  -RIJ for pressors on levophed  -Hold Lasix home med in the setting of hypotension      --------------------------------Respiratory----------------------  -Pt is tachypneic but not in distress  -CT showed mild b/l pleural effusion and left lower lobe consolidation  -HCAP vs aspiration penumonia  -f/u sputum culture  -c/w meropenem  -ID Dr. Patricio    ----------------------------------------Gastrointestinal--------------------------------------  -Patient was bacteremic with bacteroids fragilis, likely secondary from intra abdominal source.  -Patient had multiple SBO in past. Ct abdomen on admission showed ileus, Repeat CT A/P showed ileus and non occlusive ischemic colitis  -No signs of acute abdomen   -Surgery recs > No intervention, pt was admitted in Jan diagnosed with budd Chiari syndrome with portal HTN recs to transfer to [facility] but pt is unstable now  -LFTs are mildly elevated since admission, trending down. Likely due to sepsis.  -C/w meropenem  -BCx X 2 negative.  -ammonia trends down from 150 to 130, cont to trend, on lactulose and rifaximin   -had 1 BM after enema and lactulose, monitor BM  -f/u ammonia  -GI, DR Chatterjee deferred doing colonoscopy due to multiple comorbidities, FOBT positive on 10/15  -Dr Potts recs has been noted    -----------------------------------------ID---------------------------------------  -Patient is bacteremic with bacteroids likely Gi source.  -Lactate is normal 1.3  -c/w meropenem  - BP on the lower side, pt is on Levophed midodrine  -Monitor vitals Q4      ---------------------------------------------Nephro-------------------------------------  -Kidney function is normal, Cl is 120 with  sodium trends up from 147 to 150  -cont with D5 and 1/2 NS  -Monitor electrolytes  -Monitor respiratory status    ----------------------------------------Hem Onc----------------------------------  -Has h/o DVt and PE in past. Patient is on Xarelto  -Patient has anemia  -anemia of chronic disease  -Hgb 6.9 1PRBC >8.7 >7 1PRBC> 8.5  -Plat trends up from 68 to 73  -Folate and B12 normal.   - Restart lovenox  -Monitor CBC    ----------------------------------------Endo--------------------------------  -HgbA1c 4    ----------------------------------------Prophylaxis----------------------------  DVT: Restart full dose lovenox as Hb stable.   GI: PPI    ---------------------------------------GOC---------------------------  Full code

## 2020-10-25 NOTE — PROGRESS NOTE ADULT - SUBJECTIVE AND OBJECTIVE BOX
Patient was seen and examined  Patient is a 65y old  Female who presents with a chief complaint of Hypotension (25 Oct 2020 02:45)      INTERVAL HPI/OVERNIGHT EVENTS:  T(C): 35.8 (10-25-20 @ 07:30), Max: 36.4 (10-24-20 @ 23:58)  HR: 108 (10-25-20 @ 08:46) (71 - 118)  BP: 113/76 (10-25-20 @ 07:30) (71/54 - 134/103)  RR: 20 (10-25-20 @ 07:30) (14 - 30)  SpO2: 97% (10-25-20 @ 08:46) (52% - 100%)  Wt(kg): --  I&O's Summary    24 Oct 2020 07:01  -  25 Oct 2020 07:00  --------------------------------------------------------  IN: 2628.8 mL / OUT: 150 mL / NET: 2478.8 mL        LABS:                        8.8    x     )-----------( 65       ( 25 Oct 2020 06:25 )             26.7     10-25    152<H>  |  123<H>  |  12  ----------------------------<  113<H>  3.0<L>   |  22  |  1.29    Ca    8.5      25 Oct 2020 06:25  Phos  1.2     10-25  Mg     2.2     10-25    TPro  4.9<L>  /  Alb  2.6<L>  /  TBili  2.6<H>  /  DBili  x   /  AST  133<H>  /  ALT  94<H>  /  AlkPhos  116  10-25    PT/INR - ( 25 Oct 2020 06:25 )   PT: 18.8 sec;   INR: 1.62 ratio         PTT - ( 25 Oct 2020 06:25 )  PTT:42.0 sec    CAPILLARY BLOOD GLUCOSE      POCT Blood Glucose.: 162 mg/dL (24 Oct 2020 22:47)  POCT Blood Glucose.: 190 mg/dL (24 Oct 2020 20:23)  POCT Blood Glucose.: 183 mg/dL (24 Oct 2020 18:59)  POCT Blood Glucose.: 195 mg/dL (24 Oct 2020 17:31)  POCT Blood Glucose.: 161 mg/dL (24 Oct 2020 14:34)  POCT Blood Glucose.: 107 mg/dL (24 Oct 2020 10:52)    LIPID PANEL  Cholesterol --  LDL --  HDL --  RATIO HDL/Total Cholesterol --  Triglyceride 86      ABG - ( 25 Oct 2020 04:11 )  pH, Arterial: 7.40  pH, Blood: x     /  pCO2: 33    /  pO2: 164   / HCO3: 20    / Base Excess: -3.9  /  SaO2: 98                    MEDICATIONS  (STANDING):  chlorhexidine 0.12% Liquid 15 milliLiter(s) Oral Mucosa every 12 hours  chlorhexidine 2% Cloths 1 Application(s) Topical daily  dextrose 5% + sodium chloride 0.45%. 1000 milliLiter(s) (75 mL/Hr) IV Continuous <Continuous>  fentaNYL   Infusion. 0.5 MICROgram(s)/kG/Hr (2.81 mL/Hr) IV Continuous <Continuous>  lactulose Syrup 30 Gram(s) Oral every 6 hours  meropenem  IVPB 1000 milliGRAM(s) IV Intermittent every 8 hours  midodrine. 10 milliGRAM(s) Oral three times a day  mupirocin 2% Nasal 1 Application(s) Both Nostrils two times a day  norepinephrine Infusion 0.05 MICROgram(s)/kG/Min (2.63 mL/Hr) IV Continuous <Continuous>  nystatin Powder 1 Application(s) Topical two times a day  pantoprazole  Injectable 40 milliGRAM(s) IV Push two times a day  potassium chloride  10 mEq/100 mL IVPB 10 milliEquivalent(s) IV Intermittent every 2 hours  potassium phosphate IVPB 30 milliMole(s) IV Intermittent once  rifAXIMin 550 milliGRAM(s) Oral two times a day    MEDICATIONS  (PRN):  sodium chloride 0.9% lock flush 10 milliLiter(s) IV Push every 1 hour PRN Pre/post blood products, medications, blood draw, and to maintain line patency      RADIOLOGY & ADDITIONAL TESTS:    Imaging Personally Reviewed:  [ ] YES  [ ] NO    REVIEW OF SYSTEMS:  unable   Consultant(s) Notes Reviewed:  [ x ] YES  [ ] NO    PHYSICAL EXAM:  GENERAL: NAD, well-groomed, well-developed  HEAD:  Atraumatic, Normocephalic  EYES: EOMI, PERRLA, conjunctiva and sclera clear  ENMT: No tonsillar erythema, exudates, or enlargement; Moist mucous membranes, Good dentition, No lesions  NECK: Supple, No JVD, Normal thyroid  NERVOUS SYSTEM:  on vent   CHEST/LUNG: bl rhonchi   HEART: Regular rate and rhythm; No murmurs, rubs, or gallops  ABDOMEN: Soft, Nontender, Nondistended; Bowel sounds present  EXTREMITIES:  2+ Peripheral Pulses, No clubbing, cyanosis, or edema  LYMPH: No lymphadenopathy noted  SKIN: No rashes or lesions    Care Discussed with Consultants/Other Providers [ x] YES  [ ] NO

## 2020-10-25 NOTE — PROGRESS NOTE ADULT - SUBJECTIVE AND OBJECTIVE BOX
Patient is seen and examined at the bed side, is hypothermic, on CARLOTTA HUGGER.  She remains intubated and on pressor. The bilirubin stay elevated and WBC count is dropping.        REVIEW OF SYSTEMS: Unable to obtain due to mental status         ALLERGIES: No Known Allergies      ICU Vital Signs Last 24 Hrs  T(C): 35.8 (25 Oct 2020 17:16), Max: 36.4 (24 Oct 2020 23:58)  T(F): 96.5 (25 Oct 2020 17:16), Max: 97.5 (24 Oct 2020 23:58)  HR: 101 (25 Oct 2020 17:00) (74 - 113)  BP: 95/76 (25 Oct 2020 17:00) (75/58 - 117/96)  BP(mean): 72 (25 Oct 2020 17:00) (62 - 87)  ABP: --  ABP(mean): --  RR: 24 (25 Oct 2020 17:00) (14 - 30)  SpO2: 94% (25 Oct 2020 17:00) (87% - 100%)        PHYSICAL EXAM:  GENERAL: Intubated /vented, on CARLOTTA HUGGER  CHEST/LUNG: Not using accessory muscles   HEART: s1 and s2 present  ABDOMEN:  Nontender and  Nondistended  EXTREMITIES: No pedal  edema  CNS: Intubated/vented          LABS:                        9.2    3.33  )-----------( 58       ( 25 Oct 2020 12:24 )             27.5                         6.9    6.51  )-----------( 111      ( 22 Oct 2020 13:40 )             20.8       10-25    152<H>  |  123<H>  |  11  ----------------------------<  88  3.4<L>   |  23  |  1.21    Ca    8.5      25 Oct 2020 12:24  Phos  1.9     10-25  Mg     2.2     10-25    TPro  4.9<L>  /  Alb  2.6<L>  /  TBili  2.6<H>  /  DBili  x   /  AST  133<H>  /  ALT  94<H>  /  AlkPhos  116  10-25    10-24    150<H>  |  125<H>  |  11  ----------------------------<  149<H>  4.0   |  18<L>  |  0.96    Ca    8.2<L>      24 Oct 2020 06:16  Phos  2.7     10-24  Mg     2.3     10-24    TPro  4.8<L>  /  Alb  2.8<L>  /  TBili  2.6<H>  /  DBili  x   /  AST  188<H>  /  ALT  105<H>  /  AlkPhos  112  10-24      TPro  4.2<L>  /  Alb  2.4<L>  /  TBili  2.4<H>  /  DBili  x   /  AST  208<H>  /  ALT  87<H>  /  AlkPhos  105  10-22        Procalcitonin, Serum (10.15.20 @ 11:48)   Procalcitonin, Serum: 0.16: Procalcitonin (PCT) Interpretation (ng/mL) - Diagnosis of systemic   bacterial infection/sepsis         MEDICATIONS  (STANDING):    chlorhexidine 0.12% Liquid 15 milliLiter(s) Oral Mucosa every 12 hours  chlorhexidine 2% Cloths 1 Application(s) Topical daily  enoxaparin Injectable 60 milliGRAM(s) SubCutaneous every 12 hours  fentaNYL   Infusion. 1 MICROgram(s)/kG/Hr (5.62 mL/Hr) IV Continuous <Continuous>  lactulose Syrup 30 Gram(s) Oral every 6 hours  meropenem  IVPB 1000 milliGRAM(s) IV Intermittent every 8 hours  midodrine. 10 milliGRAM(s) Oral three times a day  mupirocin 2% Nasal 1 Application(s) Both Nostrils two times a day  norepinephrine Infusion 0.05 MICROgram(s)/kG/Min (2.63 mL/Hr) IV Continuous <Continuous>  nystatin Powder 1 Application(s) Topical two times a day  pantoprazole  Injectable 40 milliGRAM(s) IV Push two times a day  polyethylene glycol 3350 17 Gram(s) Oral daily  rifAXIMin 550 milliGRAM(s) Oral two times a day        RADIOLOGY & ADDITIONAL TESTS:    < from: Xray Chest 1 View- PORTABLE-Routine (Xray Chest 1 View- PORTABLE-Routine in AM.) (10.25.20 @ 09:21) >    Frontal expiratory view of the chest shows the heart to be similar in size. Endotracheal tube, right jugular line and feeding tube remain present.    The lungs show similar left upper lobe infiltrate with progression of right perihilar infiltrate. Pleural effusions are similar. There is no evidence of pneumothorax.    < end of copied text >    10/23/20 : Xray Chest 1 View-PORTABLE IMMEDIATE (Xray Chest 1 View-PORTABLE IMMEDIATE .) (10.23.20 @ 19:09) Feeding tube tip near GE junction as noted. Questionable right upper lobe infiltrate. Follow-up study is recommended as clinically warranted.      10/21/20 : CT Abdomen and Pelvis w/ Oral Cont and w/ IV Cont (10.21.20 @ 15:59) Mural thickening of the left colon and rectum. Liquid stool in the colon. Apparent segmental mural thickening of the mid to distal small bowel and the proximal duodenum. Findings may represent nonspecific enterocolitis, noninfectious inflammatory bowel disease, or ischemic bowel. Clinical correlation is recommended. The celiac axis artery, SMA, and KINA are patent without stenosis.    Dilatation of the mid small bowel is again noted. Oral contrast has reached the terminal ileum. Findings may represent small bowel obstruction, or ileus related to nonspecific enterocolitis.   Cholelithiasis.    Moderate to large ascites in the abdomen, increased since the previous examination. 5 mm nonspecific noncalcified left upper lobe lung nodule; if the patient's is in the high risk category (i.e. smoker), follow-up chest CT may be pursued in 12 months to ensure stability.    Combination of atelectasis and consolidation in the left lower lobe.    Possible 1.0 cm hypodense lesion in the left lobe of the thyroid. Thyroid ultrasound may be pursued for further evaluation.   Mild bilateral pleural effusions.    Aging determinate compression fracture at T5 vertebra.        10/13/20 : CT Abdomen and Pelvis w/ IV Cont (10.13.20 @ 18:50) >    Diffuse dilatation of small bowel loops without a clear transition is slightly increased, likely an ileus.  Decreased moderate ascites.    1.5 cm pancreatic versus peripancreatic soft tissue nodule    10/13/20 : Xray Chest 1 View- PORTABLE-Urgent (Xray Chest 1 View- PORTABLE-Urgent .) (10.13.20 @ 16:34) Clear lungs.          MICROBIOLOGY DATA:    MRSA/MSSA PCR (10.21.20 @ 09:41)   MRSA PCR Result.: Detected:       Culture - Blood (10.20.20 @ 22:09)   Specimen Source: .Blood Blood   Culture Results:  No growth to date.     Culture - Blood (10.20.20 @ 22:09)   Specimen Source: .Blood Blood   Culture Results:   No growth to date.     Culture - Blood in AM (10.16.20 @ 10:13)   Specimen Source: .Blood Blood-Peripheral   Culture Results:   No growth to date.     Culture - Blood in AM (10.16.20 @ 10:13)   Specimen Source: .Blood Blood-Peripheral   Culture Results:   No growth to date.     Culture - Blood (10.13.20 @ 22:23)   Growth in anaerobic bottle: Gram Negative Rods   Specimen Source: .Blood Blood-Peripheral   Organism: Blood Culture PCR   Culture Results:   Growth in anaerobic bottle: Bacteroides fragilis   "Susceptibilities not performed"   ***Blood Panel PCR results on this specimen are available   approximately 3 hours after the Gram stain result.***   Gram stain, PCR, and/or culture results may not always   correspond due to difference in methodologies.     Culture - Blood (10.13.20 @ 22:23)   Specimen Source: .Blood Blood-Peripheral   Culture Results:   No growth to date.     Urine Microscopic-Add On (NC) (10.13.20 @ 21:39)   Bacteria: Moderate /HPF   Epithelial Cells: Moderate /HPF   Red Blood Cell - Urine: 5-10 /HPF   White Blood Cell - Urine: 6-10 /HPF

## 2020-10-25 NOTE — PROGRESS NOTE ADULT - SUBJECTIVE AND OBJECTIVE BOX
INTERVAL HPI/OVERNIGHT EVENTS: Had a respiratory distress and got intubated on 10/24. DNR now. PT/INR and APTT were higher.   s/p 4 FFP and 2 Vit K INR 2.17. s/p 2 PRBC > Hgb 8.5 .ammonia trends down to 130 continue to trend hgb and coags. Pt is not on sedatives. She is on lactulose but had a 1 mucoid BM. Monitor BM    Pressors: levophed  Central line : RI  NGT: 10/24  Primafit    ANTIBIOTICS:                      Antimicrobial:  meropenem  IVPB 1000 milliGRAM(s) IV Intermittent every 8 hours  rifAXIMin 550 milliGRAM(s) Oral two times a day    Cardiovascular:  midodrine. 10 milliGRAM(s) Oral three times a day  norepinephrine Infusion 0.05 MICROgram(s)/kG/Min IV Continuous <Continuous>    Pulmonary:    Hematalogic:    Other:  chlorhexidine 0.12% Liquid 15 milliLiter(s) Oral Mucosa every 12 hours  chlorhexidine 2% Cloths 1 Application(s) Topical daily  dextrose 5% + sodium chloride 0.45%. 1000 milliLiter(s) IV Continuous <Continuous>  lactulose Syrup 30 Gram(s) Oral every 6 hours  mupirocin 2% Nasal 1 Application(s) Both Nostrils two times a day  nystatin Powder 1 Application(s) Topical two times a day  pantoprazole  Injectable 40 milliGRAM(s) IV Push two times a day  sodium chloride 0.9% lock flush 10 milliLiter(s) IV Push every 1 hour PRN    chlorhexidine 0.12% Liquid 15 milliLiter(s) Oral Mucosa every 12 hours  chlorhexidine 2% Cloths 1 Application(s) Topical daily  dextrose 5% + sodium chloride 0.45%. 1000 milliLiter(s) IV Continuous <Continuous>  lactulose Syrup 30 Gram(s) Oral every 6 hours  meropenem  IVPB 1000 milliGRAM(s) IV Intermittent every 8 hours  midodrine. 10 milliGRAM(s) Oral three times a day  mupirocin 2% Nasal 1 Application(s) Both Nostrils two times a day  norepinephrine Infusion 0.05 MICROgram(s)/kG/Min IV Continuous <Continuous>  nystatin Powder 1 Application(s) Topical two times a day  pantoprazole  Injectable 40 milliGRAM(s) IV Push two times a day  rifAXIMin 550 milliGRAM(s) Oral two times a day  sodium chloride 0.9% lock flush 10 milliLiter(s) IV Push every 1 hour PRN    Drug Dosing Weight  Height (cm): 167.6 (21 Oct 2020 02:26)  Weight (kg): 56.2 (21 Oct 2020 02:26)  BMI (kg/m2): 20 (21 Oct 2020 02:26)  BSA (m2): 1.63 (21 Oct 2020 02:26)    CENTRAL LINE: [ ] YES [ ] NO  LOCATION:   DATE INSERTED:  REMOVE: [ ] YES [ ] NO  EXPLAIN:    TREJO: [ ] YES [ ] NO    DATE INSERTED:  REMOVE:  [ ] YES [ ] NO  EXPLAIN:    A-LINE:  [ ] YES [ ] NO  LOCATION:   DATE INSERTED:  REMOVE:  [ ] YES [ ] NO  EXPLAIN:    PMH -reviewed admission note, no change since admission    ICU Vital Signs Last 24 Hrs  T(C): 36.2 (25 Oct 2020 00:15), Max: 36.4 (24 Oct 2020 23:58)  T(F): 97.2 (25 Oct 2020 00:15), Max: 97.5 (24 Oct 2020 23:58)  HR: 94 (25 Oct 2020 02:30) (71 - 118)  BP: 91/67 (25 Oct 2020 02:30) (71/54 - 134/103)  BP(mean): 73 (25 Oct 2020 02:30) (57 - 111)  ABP: --  ABP(mean): --  RR: 22 (25 Oct 2020 02:30) (14 - 27)  SpO2: 99% (25 Oct 2020 02:30) (52% - 100%)      ABG - ( 24 Oct 2020 16:38 )  pH, Arterial: 7.32  pH, Blood: x     /  pCO2: 36    /  pO2: 378   / HCO3: 18    / Base Excess: -6.9  /  SaO2: 98                    10-23 @ 07:01  -  10-24 @ 07:00  --------------------------------------------------------  IN: 2025 mL / OUT: 125 mL / NET: 1900 mL        Mode: AC/ CMV (Assist Control/ Continuous Mandatory Ventilation)  RR (machine): 24  TV (machine): 375  FiO2: 50  PEEP: 5  ITime: 1  MAP: 11  PIP: 24      PHYSICAL EXAM:  GENERAL: intubated, sedated  HEAD:  Atraumatic, Normocephalic  EYES: icterus sclera, no conjunctivitis   ENMT:  some mucosal bleeding was noted on lips, no signs of active bleeding  NECK: Supple, normal appearance, No JVD; Normal thyroid; Trachea midline  NERVOUS SYSTEM:  AAOX0  CHEST/LUNG: No chest deformity; Normal percussion bilaterally; No rales, rhonchi, wheezing   HEART: Regular rate and rhythm; No murmurs, rubs, or gallops  ABDOMEN: Soft, Nontender, mildly distended, Bowel sounds present  EXTREMITIES:  2+ Peripheral Pulses, No clubbing, cyanosis, but pitting edema +ve  LYMPH: No lymphadenopathy noted  SKIN: No rashes or lesions; Good capillary refill      LABS:  CBC Full  -  ( 25 Oct 2020 02:04 )  WBC Count : 3.33 K/uL  RBC Count : 2.77 M/uL  Hemoglobin : 8.5 g/dL  Hematocrit : 26.4 %  Platelet Count - Automated : 73 K/uL  Mean Cell Volume : 95.3 fl  Mean Cell Hemoglobin : 30.7 pg  Mean Cell Hemoglobin Concentration : 32.2 gm/dL  Auto Neutrophil # : x  Auto Lymphocyte # : x  Auto Monocyte # : x  Auto Eosinophil # : x  Auto Basophil # : x  Auto Neutrophil % : x  Auto Lymphocyte % : x  Auto Monocyte % : x  Auto Eosinophil % : x  Auto Basophil % : x    10-24    150<H>  |  125<H>  |  11  ----------------------------<  149<H>  4.0   |  18<L>  |  0.96    Ca    8.2<L>      24 Oct 2020 06:16  Phos  2.7     10-24  Mg     2.3     10-24    TPro  4.8<L>  /  Alb  2.8<L>  /  TBili  2.6<H>  /  DBili  x   /  AST  188<H>  /  ALT  105<H>  /  AlkPhos  112  10-24    PT/INR - ( 24 Oct 2020 17:40 )   PT: 24.9 sec;   INR: 2.17 ratio         PTT - ( 24 Oct 2020 17:40 )  PTT:45.9 sec        RADIOLOGY & ADDITIONAL STUDIES REVIEWED:  ***    GOALS OF CARE DISCUSSION WITH PATIENT/FAMILY/PROXY:    CRITICAL CARE TIME SPENT: 35 minutes INTERVAL HPI/OVERNIGHT EVENTS: Had a respiratory distress and got intubated on 10/24. DNR now. PT/INR and APTT were higher.   s/p 4 FFP and 2 Vit K INR 2.17. s/p 2 PRBC > Hgb 8.5 .ammonia trends down to 130 continue to trend hgb and coags. Pt is not on sedatives. She is on lactulose but had a 1 mucoid BM. Monitor BM    Pressors: levophed  Central line : RIJ 10/21  NGT: 10/24  FC: 10/25    ANTIBIOTICS:                     Antimicrobial:  meropenem  IVPB 1000 milliGRAM(s) IV Intermittent every 8 hours  rifAXIMin 550 milliGRAM(s) Oral two times a day    Cardiovascular:  midodrine. 10 milliGRAM(s) Oral three times a day  norepinephrine Infusion 0.05 MICROgram(s)/kG/Min IV Continuous <Continuous>    Pulmonary:    Hematalogic:    Other:  chlorhexidine 0.12% Liquid 15 milliLiter(s) Oral Mucosa every 12 hours  chlorhexidine 2% Cloths 1 Application(s) Topical daily  dextrose 5% + sodium chloride 0.45%. 1000 milliLiter(s) IV Continuous <Continuous>  lactulose Syrup 30 Gram(s) Oral every 6 hours  mupirocin 2% Nasal 1 Application(s) Both Nostrils two times a day  nystatin Powder 1 Application(s) Topical two times a day  pantoprazole  Injectable 40 milliGRAM(s) IV Push two times a day  sodium chloride 0.9% lock flush 10 milliLiter(s) IV Push every 1 hour PRN    chlorhexidine 0.12% Liquid 15 milliLiter(s) Oral Mucosa every 12 hours  chlorhexidine 2% Cloths 1 Application(s) Topical daily  dextrose 5% + sodium chloride 0.45%. 1000 milliLiter(s) IV Continuous <Continuous>  lactulose Syrup 30 Gram(s) Oral every 6 hours  meropenem  IVPB 1000 milliGRAM(s) IV Intermittent every 8 hours  midodrine. 10 milliGRAM(s) Oral three times a day  mupirocin 2% Nasal 1 Application(s) Both Nostrils two times a day  norepinephrine Infusion 0.05 MICROgram(s)/kG/Min IV Continuous <Continuous>  nystatin Powder 1 Application(s) Topical two times a day  pantoprazole  Injectable 40 milliGRAM(s) IV Push two times a day  rifAXIMin 550 milliGRAM(s) Oral two times a day  sodium chloride 0.9% lock flush 10 milliLiter(s) IV Push every 1 hour PRN    Drug Dosing Weight  Height (cm): 167.6 (21 Oct 2020 02:26)  Weight (kg): 56.2 (21 Oct 2020 02:26)  BMI (kg/m2): 20 (21 Oct 2020 02:26)  BSA (m2): 1.63 (21 Oct 2020 02:26)    CENTRAL LINE: [ ] YES [ ] NO  LOCATION:   DATE INSERTED:  REMOVE: [ ] YES [ ] NO  EXPLAIN:    TREJO: [ ] YES [ ] NO    DATE INSERTED:  REMOVE:  [ ] YES [ ] NO  EXPLAIN:    A-LINE:  [ ] YES [ ] NO  LOCATION:   DATE INSERTED:  REMOVE:  [ ] YES [ ] NO  EXPLAIN:    PMH -reviewed admission note, no change since admission    ICU Vital Signs Last 24 Hrs  T(C): 36.2 (25 Oct 2020 00:15), Max: 36.4 (24 Oct 2020 23:58)  T(F): 97.2 (25 Oct 2020 00:15), Max: 97.5 (24 Oct 2020 23:58)  HR: 94 (25 Oct 2020 02:30) (71 - 118)  BP: 91/67 (25 Oct 2020 02:30) (71/54 - 134/103)  BP(mean): 73 (25 Oct 2020 02:30) (57 - 111)  ABP: --  ABP(mean): --  RR: 22 (25 Oct 2020 02:30) (14 - 27)  SpO2: 99% (25 Oct 2020 02:30) (52% - 100%)      ABG - ( 24 Oct 2020 16:38 )  pH, Arterial: 7.32  pH, Blood: x     /  pCO2: 36    /  pO2: 378   / HCO3: 18    / Base Excess: -6.9  /  SaO2: 98                    10-23 @ 07:01  -  10-24 @ 07:00  --------------------------------------------------------  IN: 2025 mL / OUT: 125 mL / NET: 1900 mL        Mode: AC/ CMV (Assist Control/ Continuous Mandatory Ventilation)  RR (machine): 24  TV (machine): 375  FiO2: 50  PEEP: 5  ITime: 1  MAP: 11  PIP: 24      PHYSICAL EXAM:  GENERAL: intubated, sedated  HEAD:  Atraumatic, Normocephalic  EYES: icterus sclera, no conjunctivitis   ENMT:  some mucosal bleeding was noted on lips, no signs of active bleeding  NECK: Supple, normal appearance, No JVD; Normal thyroid; Trachea midline  NERVOUS SYSTEM:  AAOX0  CHEST/LUNG: No chest deformity; Normal percussion bilaterally; No rales, rhonchi, wheezing   HEART: Regular rate and rhythm; No murmurs, rubs, or gallops  ABDOMEN: Soft, Nontender, mildly distended, Bowel sounds present  EXTREMITIES:  2+ Peripheral Pulses, No clubbing, cyanosis, but pitting edema +ve  LYMPH: No lymphadenopathy noted  SKIN: No rashes or lesions; Good capillary refill      LABS:  CBC Full  -  ( 25 Oct 2020 02:04 )  WBC Count : 3.33 K/uL  RBC Count : 2.77 M/uL  Hemoglobin : 8.5 g/dL  Hematocrit : 26.4 %  Platelet Count - Automated : 73 K/uL  Mean Cell Volume : 95.3 fl  Mean Cell Hemoglobin : 30.7 pg  Mean Cell Hemoglobin Concentration : 32.2 gm/dL  Auto Neutrophil # : x  Auto Lymphocyte # : x  Auto Monocyte # : x  Auto Eosinophil # : x  Auto Basophil # : x  Auto Neutrophil % : x  Auto Lymphocyte % : x  Auto Monocyte % : x  Auto Eosinophil % : x  Auto Basophil % : x    10-24    150<H>  |  125<H>  |  11  ----------------------------<  149<H>  4.0   |  18<L>  |  0.96    Ca    8.2<L>      24 Oct 2020 06:16  Phos  2.7     10-24  Mg     2.3     10-24    TPro  4.8<L>  /  Alb  2.8<L>  /  TBili  2.6<H>  /  DBili  x   /  AST  188<H>  /  ALT  105<H>  /  AlkPhos  112  10-24    PT/INR - ( 24 Oct 2020 17:40 )   PT: 24.9 sec;   INR: 2.17 ratio         PTT - ( 24 Oct 2020 17:40 )  PTT:45.9 sec        RADIOLOGY & ADDITIONAL STUDIES REVIEWED:  ***    GOALS OF CARE DISCUSSION WITH PATIENT/FAMILY/PROXY:    CRITICAL CARE TIME SPENT: 35 minutes

## 2020-10-26 NOTE — ADVANCED PRACTICE NURSE CONSULT - RECOMMEDATIONS
-Clean all wounds with warm water, mild soap, pat dry, and apply skin prep to the surrounding skin  -Apply Interdry sheets to the areas of the Bilateral Groin and Medial Thigh areas b.i.d PRN  -Please consider fecal management system to assist with wound healing and monitoring  -Apply TRIAD Moisture Barrier cream to the Perianal and Bilateral Gluteal areas b.i.d. PRN  -Elevate/float the patients heels using heel protectors and reposition the patient Q 2hrs using wedge or pillows -Clean all wounds with warm water, mild soap, pat dry, and apply skin prep to the surrounding skin  -Please consult Palliative Care for further evaluation  -Apply Interdry sheets to the areas of the Bilateral Groin and Medial Thigh areas b.i.d PRN  -Please consider fecal management system to assist with wound healing and monitoring  -Apply TRIAD Moisture Barrier cream to the Perianal and Bilateral Gluteal areas b.i.d. PRN  -Cover the Bilateral Elbow wounds with an ABD pad and wrap with a gauze wrap Daily PRN  -Elevate/float the patients heels using heel protectors and reposition the patient Q 2hrs using wedge or pillows

## 2020-10-26 NOTE — PROGRESS NOTE ADULT - SUBJECTIVE AND OBJECTIVE BOX
intubated  still hypothermia  still on pressor  no gross bleeding  off anticoag  PT/PTT went very high  s/p Vit K 10 mg x2  the INR and PTT have come down    MEDICATIONS  (STANDING):  chlorhexidine 0.12% Liquid 15 milliLiter(s) Oral Mucosa every 12 hours  chlorhexidine 2% Cloths 1 Application(s) Topical daily  fentaNYL   Infusion. 1 MICROgram(s)/kG/Hr (5.62 mL/Hr) IV Continuous <Continuous>  lactulose Syrup 30 Gram(s) Oral every 6 hours  meropenem  IVPB 1000 milliGRAM(s) IV Intermittent every 8 hours  midodrine. 10 milliGRAM(s) Oral three times a day  mupirocin 2% Nasal 1 Application(s) Both Nostrils two times a day  norepinephrine Infusion 0.05 MICROgram(s)/kG/Min (2.63 mL/Hr) IV Continuous <Continuous>  nystatin Powder 1 Application(s) Topical two times a day  pantoprazole  Injectable 40 milliGRAM(s) IV Push two times a day  polyethylene glycol 3350 17 Gram(s) Oral daily  rifAXIMin 550 milliGRAM(s) Oral two times a day  vancomycin  IVPB 1000 milliGRAM(s) IV Intermittent every 12 hours    MEDICATIONS  (PRN):  sodium chloride 0.9% lock flush 10 milliLiter(s) IV Push every 1 hour PRN Pre/post blood products, medications, blood draw, and to maintain line patency      Allergies    No Known Allergies    Intolerances        Vital Signs Last 24 Hrs  T(C): 35.9 (26 Oct 2020 09:00), Max: 36.1 (25 Oct 2020 20:23)  T(F): 96.7 (26 Oct 2020 09:00), Max: 97 (25 Oct 2020 20:23)  HR: 109 (26 Oct 2020 09:30) (92 - 111)  BP: 81/65 (26 Oct 2020 09:30) (81/65 - 105/79)  BP(mean): 68 (26 Oct 2020 09:30) (67 - 82)  RR: 24 (26 Oct 2020 09:30) (19 - 25)  SpO2: 100% (26 Oct 2020 09:30) (94% - 100%)    PHYSICAL EXAM  General: adult in NAD  HEENT: clear oropharynx, anicteric sclera, pink conjunctiva  Neck: supple  CV: normal S1/S2 with no murmur rubs or gallops  Lungs: positive air movement b/l ant lungs,clear to auscultation, no wheezes, no rales  Abdomen: soft non-tender non-distended, no hepatosplenomegaly  Ext: no clubbing cyanosis or edema  Skin: no rashes and no petechiae  Neuro: alert and oriented X 4, no focal deficits  LABS:                          9.6    3.70  )-----------( 48       ( 26 Oct 2020 06:39 )             28.6         Mean Cell Volume : 93.5 fl  Mean Cell Hemoglobin : 31.4 pg  Mean Cell Hemoglobin Concentration : 33.6 gm/dL  Auto Neutrophil # : 2.31 K/uL  Auto Lymphocyte # : 1.08 K/uL  Auto Monocyte # : 0.18 K/uL  Auto Eosinophil # : 0.09 K/uL  Auto Basophil # : 0.00 K/uL  Auto Neutrophil % : 62.4 %  Auto Lymphocyte % : 29.2 %  Auto Monocyte % : 4.9 %  Auto Eosinophil % : 2.4 %  Auto Basophil % : 0.0 %    Serial CBC  Hematocrit 28.6  Hemoglobin 9.6  Plat 48  RBC 3.06  WBC 3.70  Serial CBC  Hematocrit 28.7  Hemoglobin 9.6  Plat 52  RBC 3.07  WBC 3.38  Serial CBC  Hematocrit 27.5  Hemoglobin 9.2  Plat 58  RBC 2.93  WBC 3.33  Serial CBC  Hematocrit 26.7  Hemoglobin 8.8  Plat 65  RBC 2.81  WBC 3.35  Serial CBC  Hematocrit 26.4  Hemoglobin 8.5  Plat 73  RBC 2.77  WBC 3.33  Serial CBC  Hematocrit 22.3  Hemoglobin 7.0  Plat 68  RBC 2.26  WBC 2.49  Serial CBC  Hematocrit 24.3  Hemoglobin 7.7  Plat 83  RBC 2.46  WBC 4.08  Serial CBC  Hematocrit 26.2  Hemoglobin 8.6  Plat 98  RBC 2.79  WBC 5.63  Serial CBC  Hematocrit 25.9  Hemoglobin 8.7  Plat 107  RBC 2.76  WBC 6.89  Serial CBC  Hematocrit 26.0  Hemoglobin 8.7  Plat 101  RBC 2.80  WBC 6.17  Serial CBC  Hematocrit 20.8  Hemoglobin 6.9  Plat 111  RBC 2.23  WBC 6.51    10-26    151<H>  |  123<H>  |  10  ----------------------------<  71  3.2<L>   |  23  |  0.98    Ca    8.4      26 Oct 2020 06:39  Phos  1.7     10-26  Mg     2.1     10-26    TPro  4.3<L>  /  Alb  2.1<L>  /  TBili  3.1<H>  /  DBili  x   /  AST  104<H>  /  ALT  85<H>  /  AlkPhos  115  10-26      PT/INR - ( 26 Oct 2020 06:39 )   PT: 19.0 sec;   INR: 1.63 ratio         PTT - ( 26 Oct 2020 06:39 )  PTT:47.6 sec    Ferritin, Serum: 810 ng/mL (10-22 @ 22:11)  Iron - Total Binding Capacity.: 63 ug/dL (10-22 @ 13:40)            BLOOD SMEAR INTERPRETATION:       RADIOLOGY & ADDITIONAL STUDIES:

## 2020-10-26 NOTE — PROGRESS NOTE ADULT - SUBJECTIVE AND OBJECTIVE BOX
Patient was seen and examined  Patient is a 65y old  Female who presents with a chief complaint of Hypotension (25 Oct 2020 17:28)      INTERVAL HPI/OVERNIGHT EVENTS:  T(C): 35.8 (10-26-20 @ 05:15), Max: 36.1 (10-25-20 @ 20:23)  HR: 103 (10-26-20 @ 06:00) (92 - 113)  BP: 92/72 (10-26-20 @ 06:00) (85/69 - 117/96)  RR: 24 (10-26-20 @ 06:00) (17 - 27)  SpO2: 100% (10-26-20 @ 06:00) (94% - 100%)  Wt(kg): --  I&O's Summary    25 Oct 2020 07:01  -  26 Oct 2020 07:00  --------------------------------------------------------  IN: 2283 mL / OUT: 590 mL / NET: 1693 mL        LABS:                        9.6    3.70  )-----------( 48       ( 26 Oct 2020 06:39 )             28.6     10-26    151<H>  |  123<H>  |  x   ----------------------------<  x   3.2<L>   |  x   |  x     Ca    8.2<L>      25 Oct 2020 19:22  Phos  2.4     10-25  Mg     2.1     10-25    TPro  4.9<L>  /  Alb  2.6<L>  /  TBili  2.6<H>  /  DBili  x   /  AST  133<H>  /  ALT  94<H>  /  AlkPhos  116  10-25    PT/INR - ( 26 Oct 2020 06:39 )   PT: 19.0 sec;   INR: 1.63 ratio         PTT - ( 26 Oct 2020 06:39 )  PTT:47.6 sec    CAPILLARY BLOOD GLUCOSE      POCT Blood Glucose.: 88 mg/dL (26 Oct 2020 05:50)  POCT Blood Glucose.: 65 mg/dL (26 Oct 2020 00:03)  POCT Blood Glucose.: 76 mg/dL (25 Oct 2020 17:32)  POCT Blood Glucose.: 95 mg/dL (25 Oct 2020 12:08)    LIPID PANEL  Cholesterol --  LDL --  HDL --  RATIO HDL/Total Cholesterol --  Triglyceride 86      ABG - ( 26 Oct 2020 04:13 )  pH, Arterial: 7.38  pH, Blood: x     /  pCO2: 36    /  pO2: 196   / HCO3: 21    / Base Excess: -3.3  /  SaO2: 98                    MEDICATIONS  (STANDING):  chlorhexidine 0.12% Liquid 15 milliLiter(s) Oral Mucosa every 12 hours  chlorhexidine 2% Cloths 1 Application(s) Topical daily  fentaNYL   Infusion. 1 MICROgram(s)/kG/Hr (5.62 mL/Hr) IV Continuous <Continuous>  lactulose Syrup 30 Gram(s) Oral every 6 hours  meropenem  IVPB 1000 milliGRAM(s) IV Intermittent every 8 hours  midodrine. 10 milliGRAM(s) Oral three times a day  mupirocin 2% Nasal 1 Application(s) Both Nostrils two times a day  norepinephrine Infusion 0.05 MICROgram(s)/kG/Min (2.63 mL/Hr) IV Continuous <Continuous>  nystatin Powder 1 Application(s) Topical two times a day  pantoprazole  Injectable 40 milliGRAM(s) IV Push two times a day  polyethylene glycol 3350 17 Gram(s) Oral daily  rifAXIMin 550 milliGRAM(s) Oral two times a day  vancomycin  IVPB 1000 milliGRAM(s) IV Intermittent every 12 hours    MEDICATIONS  (PRN):  sodium chloride 0.9% lock flush 10 milliLiter(s) IV Push every 1 hour PRN Pre/post blood products, medications, blood draw, and to maintain line patency      RADIOLOGY & ADDITIONAL TESTS:    Imaging Personally Reviewed:  [ ] YES  [ ] NO    REVIEW OF SYSTEMS:  on vent   Consultant(s) Notes Reviewed:  [ ] YES  [ ] NO    PHYSICAL EXAM:  GENERAL: NAD, well-groomed, well-developed  HEAD:  Atraumatic, Normocephalic  EYES: EOMI, PERRLA, conjunctiva and sclera clear  ENMT: No tonsillar erythema, exudates, or enlargement; Moist mucous membranes, Good dentition, No lesions  NECK: Supple, No JVD, Normal thyroid  NERVOUS SYSTEM: on vent   CHEST/LUNG: bl rhonchi   HEART: Regular rate and rhythm; No murmurs, rubs, or gallops  ABDOMEN: Soft, Nontender, Nondistended; Bowel sounds present  EXTREMITIES:  2+ Peripheral Pulses, No clubbing, cyanosis, or edema  LYMPH: No lymphadenopathy noted  SKIN: No rashes or lesions    Care Discussed with Consultants/Other Providers [ x] YES  [ ] NO

## 2020-10-26 NOTE — CHART NOTE - NSCHARTNOTEFT_GEN_A_CORE
Assessment:   Patient is a 65y old  Female who presents with a chief complaint of Hypotension (26 Oct 2020 12:36). Pt intubated, TF ordered. S/p clears/ NPO day #6 (previously ileus vs SBO). Pt seen, earlier.      Factors impacting intake: [ ] none [ ] nausea  [ ] vomiting [ ] diarrhea [ ] constipation  [ ]chewing problems [ ] swallowing issues  [x ] other: vent/ TF, acute on chronic illness    Diet Prescription: Diet, NPO with Tube Feed:   Tube Feeding Modality: Nasogastric  Jevity 1.5 Papi  Total Volume for 24 Hours (mL): 960  Continuous  Starting Tube Feed Rate {mL per Hour}: 10  Until Goal Tube Feed Rate (mL per Hour): 40  Tube Feed Duration (in Hours): 24  Tube Feed Start Time: 12:00 (10-26-20 @ 12:07)        Daily     Daily Weight in k (26 Oct 2020 07:15)  Weight in k.8 (25 Oct 2020 07:00)  Weight in k.1 (23 Oct 2020 07:00)  Weight in k.6 (22 Oct 2020 07:00)  Weight in k.2 (21 Oct 2020 02:26)  Weight in k (14 Oct 2020 11:48)  Weight in k.2 (13 Oct 2020 22:25)    % Weight Change: I>O< 3+ generalized/ weeping edema noted    Pertinent Medications: MEDICATIONS  (STANDING):  chlorhexidine 0.12% Liquid 15 milliLiter(s) Oral Mucosa every 12 hours  chlorhexidine 2% Cloths 1 Application(s) Topical daily  fentaNYL   Infusion. 1 MICROgram(s)/kG/Hr (5.62 mL/Hr) IV Continuous <Continuous>  lactulose Syrup 30 Gram(s) Oral every 6 hours  meropenem  IVPB 1000 milliGRAM(s) IV Intermittent every 8 hours  midodrine. 10 milliGRAM(s) Oral three times a day  mupirocin 2% Nasal 1 Application(s) Both Nostrils two times a day  norepinephrine Infusion 0.05 MICROgram(s)/kG/Min (2.63 mL/Hr) IV Continuous <Continuous>  nystatin Powder 1 Application(s) Topical two times a day  pantoprazole  Injectable 40 milliGRAM(s) IV Push two times a day  polyethylene glycol 3350 17 Gram(s) Oral daily  rifAXIMin 550 milliGRAM(s) Oral two times a day  vancomycin  IVPB 1000 milliGRAM(s) IV Intermittent every 12 hours    MEDICATIONS  (PRN):  sodium chloride 0.9% lock flush 10 milliLiter(s) IV Push every 1 hour PRN Pre/post blood products, medications, blood draw, and to maintain line patency    Pertinent Labs: 10-26 Na151 mmol/L<H> Glu 71 mg/dL K+ 3.2 mmol/L<L> Cr  0.98 mg/dL BUN 10 mg/dL 10-26 Phos 1.7 mg/dL<L> 10-26 Alb 2.1 g/dL<L> 10-26 Chol --    LDL --    HDL --    Trig 56 mg/dL     CAPILLARY BLOOD GLUCOSE      POCT Blood Glucose.: 70 mg/dL (26 Oct 2020 11:55)  POCT Blood Glucose.: 88 mg/dL (26 Oct 2020 05:50)  POCT Blood Glucose.: 65 mg/dL (26 Oct 2020 00:03)  POCT Blood Glucose.: 76 mg/dL (25 Oct 2020 17:32)        Estimated Needs:   [x ] no change since previous assessment  [ ] recalculated:       Previous Nutrition Diagnosis:   [ ] Altered GI function  [ ]Inadequate Oral Intake [ ] Swallowing Difficulty   [ ] Altered nutrition related labs [ ] Increased Nutrient Needs [ ] Overweight/Obesity   [ ] Unintended Weight Loss [ ] Food & Nutrition Related Knowledge Deficit [x ] Malnutrition(severe)  [ ] Other:     Nutrition Diagnosis is [x ] ongoing  [ ] resolved [ ] not applicable     New Nutrition Diagnosis: [ ] not applicable       Interventions:   Recommend  [ ] Change Diet To:  [ ] Nutrition Supplement  [x ] Nutrition Support: Jevity 1.5 35x24 (initial goal): 840 ml, 1260 kcals, 54 gm protein.   [x ] Other: Electrolyte repletion. MD to monitor. RD available.     Monitoring and Evaluation:    [ x ] Tolerance to diet prescription [ x ] weights [ x ] labs[ x ] follow up per protocol  [ ] other:

## 2020-10-26 NOTE — PROGRESS NOTE ADULT - SUBJECTIVE AND OBJECTIVE BOX
INTERVAL HPI/OVERNIGHT EVENTS: ***    PRESSORS: [ ] YES [ ] NO  WHICH:    ANTIBIOTICS:                      Antimicrobial:  meropenem  IVPB 1000 milliGRAM(s) IV Intermittent every 8 hours  rifAXIMin 550 milliGRAM(s) Oral two times a day  vancomycin  IVPB 1000 milliGRAM(s) IV Intermittent every 12 hours    Cardiovascular:  midodrine. 10 milliGRAM(s) Oral three times a day  norepinephrine Infusion 0.05 MICROgram(s)/kG/Min IV Continuous <Continuous>    Pulmonary:    Hematalogic:    Other:  chlorhexidine 0.12% Liquid 15 milliLiter(s) Oral Mucosa every 12 hours  chlorhexidine 2% Cloths 1 Application(s) Topical daily  fentaNYL   Infusion. 1 MICROgram(s)/kG/Hr IV Continuous <Continuous>  lactulose Syrup 30 Gram(s) Oral every 6 hours  mupirocin 2% Nasal 1 Application(s) Both Nostrils two times a day  nystatin Powder 1 Application(s) Topical two times a day  pantoprazole  Injectable 40 milliGRAM(s) IV Push two times a day  polyethylene glycol 3350 17 Gram(s) Oral daily  sodium chloride 0.9% lock flush 10 milliLiter(s) IV Push every 1 hour PRN    chlorhexidine 0.12% Liquid 15 milliLiter(s) Oral Mucosa every 12 hours  chlorhexidine 2% Cloths 1 Application(s) Topical daily  fentaNYL   Infusion. 1 MICROgram(s)/kG/Hr IV Continuous <Continuous>  lactulose Syrup 30 Gram(s) Oral every 6 hours  meropenem  IVPB 1000 milliGRAM(s) IV Intermittent every 8 hours  midodrine. 10 milliGRAM(s) Oral three times a day  mupirocin 2% Nasal 1 Application(s) Both Nostrils two times a day  norepinephrine Infusion 0.05 MICROgram(s)/kG/Min IV Continuous <Continuous>  nystatin Powder 1 Application(s) Topical two times a day  pantoprazole  Injectable 40 milliGRAM(s) IV Push two times a day  polyethylene glycol 3350 17 Gram(s) Oral daily  rifAXIMin 550 milliGRAM(s) Oral two times a day  sodium chloride 0.9% lock flush 10 milliLiter(s) IV Push every 1 hour PRN  vancomycin  IVPB 1000 milliGRAM(s) IV Intermittent every 12 hours    Drug Dosing Weight  Height (cm): 167.6 (21 Oct 2020 02:26)  Weight (kg): 56.2 (21 Oct 2020 02:26)  BMI (kg/m2): 20 (21 Oct 2020 02:26)  BSA (m2): 1.63 (21 Oct 2020 02:26)    CENTRAL LINE: [ ] YES [ ] NO  LOCATION:   DATE INSERTED:  REMOVE: [ ] YES [ ] NO  EXPLAIN:    TREJO: [ ] YES [ ] NO    DATE INSERTED:  REMOVE:  [ ] YES [ ] NO  EXPLAIN:    A-LINE:  [ ] YES [ ] NO  LOCATION:   DATE INSERTED:  REMOVE:  [ ] YES [ ] NO  EXPLAIN:    PMH -reviewed admission note, no change since admission    ICU Vital Signs Last 24 Hrs  T(C): 35.9 (26 Oct 2020 09:00), Max: 36.1 (25 Oct 2020 20:23)  T(F): 96.7 (26 Oct 2020 09:00), Max: 97 (25 Oct 2020 20:23)  HR: 115 (26 Oct 2020 10:00) (92 - 115)  BP: 87/70 (26 Oct 2020 10:00) (81/65 - 97/75)  BP(mean): 74 (26 Oct 2020 10:00) (67 - 82)  ABP: --  ABP(mean): --  RR: 24 (26 Oct 2020 10:00) (19 - 25)  SpO2: 100% (26 Oct 2020 10:00) (94% - 100%)      ABG - ( 26 Oct 2020 04:13 )  pH, Arterial: 7.38  pH, Blood: x     /  pCO2: 36    /  pO2: 196   / HCO3: 21    / Base Excess: -3.3  /  SaO2: 98                    10-25 @ 07:01  -  10-26 @ 07:00  --------------------------------------------------------  IN: 2291.2 mL / OUT: 600 mL / NET: 1691.2 mL        Mode: AC/ CMV (Assist Control/ Continuous Mandatory Ventilation)  RR (machine): 24  TV (machine): 375  FiO2: 50  PEEP: 5  ITime: 1  MAP: 11  PIP: 23      PHYSICAL EXAM:    GENERAL: NAD, well-groomed, well-developed  HEAD:  Atraumatic, Normocephalic  EYES: EOMI, PERRLA, conjunctiva and sclera clear  ENMT: No tonsillar erythema, exudates, or enlargement; Moist mucous membranes, Good dentition, No lesions  NECK: Supple, normal appearance, No JVD; Normal thyroid; Trachea midline  NERVOUS SYSTEM:  Alert & Oriented X3, Good concentration; Motor Strength 5/5 B/L upper and lower extremities; DTRs 2+ intact and symmetric  CHEST/LUNG: No chest deformity; Normal percussion bilaterally; No rales, rhonchi, wheezing   HEART: Regular rate and rhythm; No murmurs, rubs, or gallops  ABDOMEN: Soft, Nontender, Nondistended; Bowel sounds present  EXTREMITIES:  2+ Peripheral Pulses, No clubbing, cyanosis, or edema  LYMPH: No lymphadenopathy noted  SKIN: No rashes or lesions; Good capillary refill      LABS:  CBC Full  -  ( 26 Oct 2020 06:39 )  WBC Count : 3.70 K/uL  RBC Count : 3.06 M/uL  Hemoglobin : 9.6 g/dL  Hematocrit : 28.6 %  Platelet Count - Automated : 48 K/uL  Mean Cell Volume : 93.5 fl  Mean Cell Hemoglobin : 31.4 pg  Mean Cell Hemoglobin Concentration : 33.6 gm/dL  Auto Neutrophil # : 2.31 K/uL  Auto Lymphocyte # : 1.08 K/uL  Auto Monocyte # : 0.18 K/uL  Auto Eosinophil # : 0.09 K/uL  Auto Basophil # : 0.00 K/uL  Auto Neutrophil % : 62.4 %  Auto Lymphocyte % : 29.2 %  Auto Monocyte % : 4.9 %  Auto Eosinophil % : 2.4 %  Auto Basophil % : 0.0 %    10-26    151<H>  |  123<H>  |  10  ----------------------------<  71  3.2<L>   |  23  |  0.98    Ca    8.4      26 Oct 2020 06:39  Phos  1.7     10-26  Mg     2.1     10-26    TPro  4.3<L>  /  Alb  2.1<L>  /  TBili  3.1<H>  /  DBili  x   /  AST  104<H>  /  ALT  85<H>  /  AlkPhos  115  10-26    PT/INR - ( 26 Oct 2020 06:39 )   PT: 19.0 sec;   INR: 1.63 ratio         PTT - ( 26 Oct 2020 06:39 )  PTT:47.6 sec        RADIOLOGY & ADDITIONAL STUDIES REVIEWED:  ***    GOALS OF CARE DISCUSSION WITH PATIENT/FAMILY/PROXY:    CRITICAL CARE TIME SPENT: 35 minutes INTERVAL HPI/OVERNIGHT EVENTS: overnight pt     PRESSORS: [ ] YES [ ] NO  WHICH:    ANTIBIOTICS:                      Antimicrobial:  meropenem  IVPB 1000 milliGRAM(s) IV Intermittent every 8 hours  rifAXIMin 550 milliGRAM(s) Oral two times a day  vancomycin  IVPB 1000 milliGRAM(s) IV Intermittent every 12 hours    Cardiovascular:  midodrine. 10 milliGRAM(s) Oral three times a day  norepinephrine Infusion 0.05 MICROgram(s)/kG/Min IV Continuous <Continuous>    Pulmonary:    Hematalogic:    Other:  chlorhexidine 0.12% Liquid 15 milliLiter(s) Oral Mucosa every 12 hours  chlorhexidine 2% Cloths 1 Application(s) Topical daily  fentaNYL   Infusion. 1 MICROgram(s)/kG/Hr IV Continuous <Continuous>  lactulose Syrup 30 Gram(s) Oral every 6 hours  mupirocin 2% Nasal 1 Application(s) Both Nostrils two times a day  nystatin Powder 1 Application(s) Topical two times a day  pantoprazole  Injectable 40 milliGRAM(s) IV Push two times a day  polyethylene glycol 3350 17 Gram(s) Oral daily  sodium chloride 0.9% lock flush 10 milliLiter(s) IV Push every 1 hour PRN    chlorhexidine 0.12% Liquid 15 milliLiter(s) Oral Mucosa every 12 hours  chlorhexidine 2% Cloths 1 Application(s) Topical daily  fentaNYL   Infusion. 1 MICROgram(s)/kG/Hr IV Continuous <Continuous>  lactulose Syrup 30 Gram(s) Oral every 6 hours  meropenem  IVPB 1000 milliGRAM(s) IV Intermittent every 8 hours  midodrine. 10 milliGRAM(s) Oral three times a day  mupirocin 2% Nasal 1 Application(s) Both Nostrils two times a day  norepinephrine Infusion 0.05 MICROgram(s)/kG/Min IV Continuous <Continuous>  nystatin Powder 1 Application(s) Topical two times a day  pantoprazole  Injectable 40 milliGRAM(s) IV Push two times a day  polyethylene glycol 3350 17 Gram(s) Oral daily  rifAXIMin 550 milliGRAM(s) Oral two times a day  sodium chloride 0.9% lock flush 10 milliLiter(s) IV Push every 1 hour PRN  vancomycin  IVPB 1000 milliGRAM(s) IV Intermittent every 12 hours    Drug Dosing Weight  Height (cm): 167.6 (21 Oct 2020 02:26)  Weight (kg): 56.2 (21 Oct 2020 02:26)  BMI (kg/m2): 20 (21 Oct 2020 02:26)  BSA (m2): 1.63 (21 Oct 2020 02:26)    CENTRAL LINE: [ ] YES [ ] NO  LOCATION:   DATE INSERTED:  REMOVE: [ ] YES [ ] NO  EXPLAIN:    TREJO: [ ] YES [ ] NO    DATE INSERTED:  REMOVE:  [ ] YES [ ] NO  EXPLAIN:    A-LINE:  [ ] YES [ ] NO  LOCATION:   DATE INSERTED:  REMOVE:  [ ] YES [ ] NO  EXPLAIN:    PMH -reviewed admission note, no change since admission    ICU Vital Signs Last 24 Hrs  T(C): 35.9 (26 Oct 2020 09:00), Max: 36.1 (25 Oct 2020 20:23)  T(F): 96.7 (26 Oct 2020 09:00), Max: 97 (25 Oct 2020 20:23)  HR: 115 (26 Oct 2020 10:00) (92 - 115)  BP: 87/70 (26 Oct 2020 10:00) (81/65 - 97/75)  BP(mean): 74 (26 Oct 2020 10:00) (67 - 82)  ABP: --  ABP(mean): --  RR: 24 (26 Oct 2020 10:00) (19 - 25)  SpO2: 100% (26 Oct 2020 10:00) (94% - 100%)      ABG - ( 26 Oct 2020 04:13 )  pH, Arterial: 7.38  pH, Blood: x     /  pCO2: 36    /  pO2: 196   / HCO3: 21    / Base Excess: -3.3  /  SaO2: 98                    10-25 @ 07:01  -  10-26 @ 07:00  --------------------------------------------------------  IN: 2291.2 mL / OUT: 600 mL / NET: 1691.2 mL        Mode: AC/ CMV (Assist Control/ Continuous Mandatory Ventilation)  RR (machine): 24  TV (machine): 375  FiO2: 50  PEEP: 5  ITime: 1  MAP: 11  PIP: 23      PHYSICAL EXAM:    GENERAL: NAD, well-groomed, well-developed  HEAD:  Atraumatic, Normocephalic  EYES: EOMI, PERRLA, conjunctiva and sclera clear  ENMT: No tonsillar erythema, exudates, or enlargement; Moist mucous membranes, Good dentition, No lesions  NECK: Supple, normal appearance, No JVD; Normal thyroid; Trachea midline  NERVOUS SYSTEM:  Alert & Oriented X3, Good concentration; Motor Strength 5/5 B/L upper and lower extremities; DTRs 2+ intact and symmetric  CHEST/LUNG: No chest deformity; Normal percussion bilaterally; No rales, rhonchi, wheezing   HEART: Regular rate and rhythm; No murmurs, rubs, or gallops  ABDOMEN: Soft, Nontender, Nondistended; Bowel sounds present  EXTREMITIES:  2+ Peripheral Pulses, No clubbing, cyanosis, or edema  LYMPH: No lymphadenopathy noted  SKIN: No rashes or lesions; Good capillary refill      LABS:  CBC Full  -  ( 26 Oct 2020 06:39 )  WBC Count : 3.70 K/uL  RBC Count : 3.06 M/uL  Hemoglobin : 9.6 g/dL  Hematocrit : 28.6 %  Platelet Count - Automated : 48 K/uL  Mean Cell Volume : 93.5 fl  Mean Cell Hemoglobin : 31.4 pg  Mean Cell Hemoglobin Concentration : 33.6 gm/dL  Auto Neutrophil # : 2.31 K/uL  Auto Lymphocyte # : 1.08 K/uL  Auto Monocyte # : 0.18 K/uL  Auto Eosinophil # : 0.09 K/uL  Auto Basophil # : 0.00 K/uL  Auto Neutrophil % : 62.4 %  Auto Lymphocyte % : 29.2 %  Auto Monocyte % : 4.9 %  Auto Eosinophil % : 2.4 %  Auto Basophil % : 0.0 %    10-26    151<H>  |  123<H>  |  10  ----------------------------<  71  3.2<L>   |  23  |  0.98    Ca    8.4      26 Oct 2020 06:39  Phos  1.7     10-26  Mg     2.1     10-26    TPro  4.3<L>  /  Alb  2.1<L>  /  TBili  3.1<H>  /  DBili  x   /  AST  104<H>  /  ALT  85<H>  /  AlkPhos  115  10-26    PT/INR - ( 26 Oct 2020 06:39 )   PT: 19.0 sec;   INR: 1.63 ratio         PTT - ( 26 Oct 2020 06:39 )  PTT:47.6 sec        RADIOLOGY & ADDITIONAL STUDIES REVIEWED:  ***    GOALS OF CARE DISCUSSION WITH PATIENT/FAMILY/PROXY:    CRITICAL CARE TIME SPENT: 35 minutes INTERVAL HPI/OVERNIGHT EVENTS: overnight pt didn't have any BM . Sputum culture grew gram +ve cocci, pt has been started on vancomycin.   this morning pt had a huge BM. Hold Lovenox because of low platelet f/u HIT panel    drips: fentanyl and Levophed  WHICH:    ANTIBIOTICS:                      Antimicrobial:  meropenem  IVPB 1000 milliGRAM(s) IV Intermittent every 8 hours  rifAXIMin 550 milliGRAM(s) Oral two times a day  vancomycin  IVPB 1000 milliGRAM(s) IV Intermittent every 12 hours    Cardiovascular:  midodrine. 10 milliGRAM(s) Oral three times a day  norepinephrine Infusion 0.05 MICROgram(s)/kG/Min IV Continuous <Continuous>    Pulmonary:    Hematalogic:    Other:  chlorhexidine 0.12% Liquid 15 milliLiter(s) Oral Mucosa every 12 hours  chlorhexidine 2% Cloths 1 Application(s) Topical daily  fentaNYL   Infusion. 1 MICROgram(s)/kG/Hr IV Continuous <Continuous>  lactulose Syrup 30 Gram(s) Oral every 6 hours  mupirocin 2% Nasal 1 Application(s) Both Nostrils two times a day  nystatin Powder 1 Application(s) Topical two times a day  pantoprazole  Injectable 40 milliGRAM(s) IV Push two times a day  polyethylene glycol 3350 17 Gram(s) Oral daily  sodium chloride 0.9% lock flush 10 milliLiter(s) IV Push every 1 hour PRN    chlorhexidine 0.12% Liquid 15 milliLiter(s) Oral Mucosa every 12 hours  chlorhexidine 2% Cloths 1 Application(s) Topical daily  fentaNYL   Infusion. 1 MICROgram(s)/kG/Hr IV Continuous <Continuous>  lactulose Syrup 30 Gram(s) Oral every 6 hours  meropenem  IVPB 1000 milliGRAM(s) IV Intermittent every 8 hours  midodrine. 10 milliGRAM(s) Oral three times a day  mupirocin 2% Nasal 1 Application(s) Both Nostrils two times a day  norepinephrine Infusion 0.05 MICROgram(s)/kG/Min IV Continuous <Continuous>  nystatin Powder 1 Application(s) Topical two times a day  pantoprazole  Injectable 40 milliGRAM(s) IV Push two times a day  polyethylene glycol 3350 17 Gram(s) Oral daily  rifAXIMin 550 milliGRAM(s) Oral two times a day  sodium chloride 0.9% lock flush 10 milliLiter(s) IV Push every 1 hour PRN  vancomycin  IVPB 1000 milliGRAM(s) IV Intermittent every 12 hours    Drug Dosing Weight  Height (cm): 167.6 (21 Oct 2020 02:26)  Weight (kg): 56.2 (21 Oct 2020 02:26)  BMI (kg/m2): 20 (21 Oct 2020 02:26)  BSA (m2): 1.63 (21 Oct 2020 02:26)    CENTRAL LINE: [ ] YES [ ] NO  LOCATION:   DATE INSERTED:  REMOVE: [ ] YES [ ] NO  EXPLAIN:    TREJO: [ ] YES [ ] NO    DATE INSERTED:  REMOVE:  [ ] YES [ ] NO  EXPLAIN:    A-LINE:  [ ] YES [ ] NO  LOCATION:   DATE INSERTED:  REMOVE:  [ ] YES [ ] NO  EXPLAIN:    PMH -reviewed admission note, no change since admission    ICU Vital Signs Last 24 Hrs  T(C): 35.9 (26 Oct 2020 09:00), Max: 36.1 (25 Oct 2020 20:23)  T(F): 96.7 (26 Oct 2020 09:00), Max: 97 (25 Oct 2020 20:23)  HR: 115 (26 Oct 2020 10:00) (92 - 115)  BP: 87/70 (26 Oct 2020 10:00) (81/65 - 97/75)  BP(mean): 74 (26 Oct 2020 10:00) (67 - 82)  ABP: --  ABP(mean): --  RR: 24 (26 Oct 2020 10:00) (19 - 25)  SpO2: 100% (26 Oct 2020 10:00) (94% - 100%)      ABG - ( 26 Oct 2020 04:13 )  pH, Arterial: 7.38  pH, Blood: x     /  pCO2: 36    /  pO2: 196   / HCO3: 21    / Base Excess: -3.3  /  SaO2: 98                    10-25 @ 07:01  -  10-26 @ 07:00  --------------------------------------------------------  IN: 2291.2 mL / OUT: 600 mL / NET: 1691.2 mL        Mode: AC/ CMV (Assist Control/ Continuous Mandatory Ventilation)  RR (machine): 24  TV (machine): 375  FiO2: 50  PEEP: 5  ITime: 1  MAP: 11  PIP: 23      PHYSICAL EXAM:    GENERAL: NAD, well-groomed, well-developed  HEAD:  Atraumatic, Normocephalic  EYES: EOMI, PERRLA, conjunctiva and sclera clear  ENMT: No tonsillar erythema, exudates, or enlargement; Moist mucous membranes, Good dentition, No lesions  NECK: Supple, normal appearance, No JVD; Normal thyroid; Trachea midline  NERVOUS SYSTEM:  Alert & Oriented X3, Good concentration; Motor Strength 5/5 B/L upper and lower extremities; DTRs 2+ intact and symmetric  CHEST/LUNG: No chest deformity; Normal percussion bilaterally; No rales, rhonchi, wheezing   HEART: Regular rate and rhythm; No murmurs, rubs, or gallops  ABDOMEN: Soft, Nontender, Nondistended; Bowel sounds present  EXTREMITIES:  2+ Peripheral Pulses, No clubbing, cyanosis, or edema  LYMPH: No lymphadenopathy noted  SKIN: No rashes or lesions; Good capillary refill      LABS:  CBC Full  -  ( 26 Oct 2020 06:39 )  WBC Count : 3.70 K/uL  RBC Count : 3.06 M/uL  Hemoglobin : 9.6 g/dL  Hematocrit : 28.6 %  Platelet Count - Automated : 48 K/uL  Mean Cell Volume : 93.5 fl  Mean Cell Hemoglobin : 31.4 pg  Mean Cell Hemoglobin Concentration : 33.6 gm/dL  Auto Neutrophil # : 2.31 K/uL  Auto Lymphocyte # : 1.08 K/uL  Auto Monocyte # : 0.18 K/uL  Auto Eosinophil # : 0.09 K/uL  Auto Basophil # : 0.00 K/uL  Auto Neutrophil % : 62.4 %  Auto Lymphocyte % : 29.2 %  Auto Monocyte % : 4.9 %  Auto Eosinophil % : 2.4 %  Auto Basophil % : 0.0 %    10-26    151<H>  |  123<H>  |  10  ----------------------------<  71  3.2<L>   |  23  |  0.98    Ca    8.4      26 Oct 2020 06:39  Phos  1.7     10-26  Mg     2.1     10-26    TPro  4.3<L>  /  Alb  2.1<L>  /  TBili  3.1<H>  /  DBili  x   /  AST  104<H>  /  ALT  85<H>  /  AlkPhos  115  10-26    PT/INR - ( 26 Oct 2020 06:39 )   PT: 19.0 sec;   INR: 1.63 ratio         PTT - ( 26 Oct 2020 06:39 )  PTT:47.6 sec        RADIOLOGY & ADDITIONAL STUDIES REVIEWED:  ***    GOALS OF CARE DISCUSSION WITH PATIENT/FAMILY/PROXY:    CRITICAL CARE TIME SPENT: 35 minutes INTERVAL HPI/OVERNIGHT EVENTS: sedated and intubated. overnight pt didn't have any BM . Sputum culture grew gram +ve cocci, pt has been started on vancomycin.   this morning pt had a huge BM. Hold Lovenox because of low platelet f/u HIT panel    drips: fentanyl and Levophed  Vent: 24/375/50/5  ABGs: 7.38/36/196/21                      Antimicrobial:  meropenem  IVPB 1000 milliGRAM(s) IV Intermittent every 8 hours  rifAXIMin 550 milliGRAM(s) Oral two times a day  vancomycin  IVPB 1000 milliGRAM(s) IV Intermittent every 12 hours    Cardiovascular:  midodrine. 10 milliGRAM(s) Oral three times a day  norepinephrine Infusion 0.05 MICROgram(s)/kG/Min IV Continuous <Continuous>    Pulmonary:    Hematalogic:    Other:  chlorhexidine 0.12% Liquid 15 milliLiter(s) Oral Mucosa every 12 hours  chlorhexidine 2% Cloths 1 Application(s) Topical daily  fentaNYL   Infusion. 1 MICROgram(s)/kG/Hr IV Continuous <Continuous>  lactulose Syrup 30 Gram(s) Oral every 6 hours  mupirocin 2% Nasal 1 Application(s) Both Nostrils two times a day  nystatin Powder 1 Application(s) Topical two times a day  pantoprazole  Injectable 40 milliGRAM(s) IV Push two times a day  polyethylene glycol 3350 17 Gram(s) Oral daily  sodium chloride 0.9% lock flush 10 milliLiter(s) IV Push every 1 hour PRN    chlorhexidine 0.12% Liquid 15 milliLiter(s) Oral Mucosa every 12 hours  chlorhexidine 2% Cloths 1 Application(s) Topical daily  fentaNYL   Infusion. 1 MICROgram(s)/kG/Hr IV Continuous <Continuous>  lactulose Syrup 30 Gram(s) Oral every 6 hours  meropenem  IVPB 1000 milliGRAM(s) IV Intermittent every 8 hours  midodrine. 10 milliGRAM(s) Oral three times a day  mupirocin 2% Nasal 1 Application(s) Both Nostrils two times a day  norepinephrine Infusion 0.05 MICROgram(s)/kG/Min IV Continuous <Continuous>  nystatin Powder 1 Application(s) Topical two times a day  pantoprazole  Injectable 40 milliGRAM(s) IV Push two times a day  polyethylene glycol 3350 17 Gram(s) Oral daily  rifAXIMin 550 milliGRAM(s) Oral two times a day  sodium chloride 0.9% lock flush 10 milliLiter(s) IV Push every 1 hour PRN  vancomycin  IVPB 1000 milliGRAM(s) IV Intermittent every 12 hours    Drug Dosing Weight  Height (cm): 167.6 (21 Oct 2020 02:26)  Weight (kg): 56.2 (21 Oct 2020 02:26)  BMI (kg/m2): 20 (21 Oct 2020 02:26)  BSA (m2): 1.63 (21 Oct 2020 02:26)    CENTRAL LINE: [ ] YES [ ] NO  LOCATION:   DATE INSERTED:  REMOVE: [ ] YES [ ] NO  EXPLAIN:    TREJO: [ ] YES [ ] NO    DATE INSERTED:  REMOVE:  [ ] YES [ ] NO  EXPLAIN:    A-LINE:  [ ] YES [ ] NO  LOCATION:   DATE INSERTED:  REMOVE:  [ ] YES [ ] NO  EXPLAIN:    PMH -reviewed admission note, no change since admission    ICU Vital Signs Last 24 Hrs  T(C): 35.9 (26 Oct 2020 09:00), Max: 36.1 (25 Oct 2020 20:23)  T(F): 96.7 (26 Oct 2020 09:00), Max: 97 (25 Oct 2020 20:23)  HR: 115 (26 Oct 2020 10:00) (92 - 115)  BP: 87/70 (26 Oct 2020 10:00) (81/65 - 97/75)  BP(mean): 74 (26 Oct 2020 10:00) (67 - 82)  ABP: --  ABP(mean): --  RR: 24 (26 Oct 2020 10:00) (19 - 25)  SpO2: 100% (26 Oct 2020 10:00) (94% - 100%)      ABG - ( 26 Oct 2020 04:13 )  pH, Arterial: 7.38  pH, Blood: x     /  pCO2: 36    /  pO2: 196   / HCO3: 21    / Base Excess: -3.3  /  SaO2: 98                    10-25 @ 07:01  -  10-26 @ 07:00  --------------------------------------------------------  IN: 2291.2 mL / OUT: 600 mL / NET: 1691.2 mL        Mode: AC/ CMV (Assist Control/ Continuous Mandatory Ventilation)  RR (machine): 24  TV (machine): 375  FiO2: 50  PEEP: 5  ITime: 1  MAP: 11  PIP: 23      PHYSICAL EXAM:    GENERAL: Intubated   HEAD:  Atraumatic, Normocephalic  EYES: b/l reactive pupil  NECK: Supple, normal appearance, No JVD; Normal thyroid; Trachea midline  NERVOUS SYSTEM:  AAOX0  CHEST/LUNG: No chest deformity; Normal percussion bilaterally; No rales, rhonchi, wheezing   HEART: Regular rate and rhythm; No murmurs  ABDOMEN: Soft, distended; Bowel sounds present, BM this morning  EXTREMITIES:  2+ Peripheral Pulses, No clubbing, cyanosis, or edema  LYMPH: No lymphadenopathy noted  SKIN: No rashes or lesions; Good capillary refill      LABS:  CBC Full  -  ( 26 Oct 2020 06:39 )  WBC Count : 3.70 K/uL  RBC Count : 3.06 M/uL  Hemoglobin : 9.6 g/dL  Hematocrit : 28.6 %  Platelet Count - Automated : 48 K/uL  Mean Cell Volume : 93.5 fl  Mean Cell Hemoglobin : 31.4 pg  Mean Cell Hemoglobin Concentration : 33.6 gm/dL  Auto Neutrophil # : 2.31 K/uL  Auto Lymphocyte # : 1.08 K/uL  Auto Monocyte # : 0.18 K/uL  Auto Eosinophil # : 0.09 K/uL  Auto Basophil # : 0.00 K/uL  Auto Neutrophil % : 62.4 %  Auto Lymphocyte % : 29.2 %  Auto Monocyte % : 4.9 %  Auto Eosinophil % : 2.4 %  Auto Basophil % : 0.0 %    10-26    151<H>  |  123<H>  |  10  ----------------------------<  71  3.2<L>   |  23  |  0.98    Ca    8.4      26 Oct 2020 06:39  Phos  1.7     10-26  Mg     2.1     10-26    TPro  4.3<L>  /  Alb  2.1<L>  /  TBili  3.1<H>  /  DBili  x   /  AST  104<H>  /  ALT  85<H>  /  AlkPhos  115  10-26    PT/INR - ( 26 Oct 2020 06:39 )   PT: 19.0 sec;   INR: 1.63 ratio         PTT - ( 26 Oct 2020 06:39 )  PTT:47.6 sec        RADIOLOGY & ADDITIONAL STUDIES REVIEWED:  ***    GOALS OF CARE DISCUSSION WITH PATIENT/FAMILY/PROXY:    CRITICAL CARE TIME SPENT: 35 minutes

## 2020-10-26 NOTE — PROGRESS NOTE ADULT - SUBJECTIVE AND OBJECTIVE BOX
Patient was seen and examined at bedside. Full note to follow. Chief Complaint:  Patient is a 65y old  Female who presents with a chief complaint of Hypotension (26 Oct 2020 12:36)      Reason for consult: r/o Budd Chiari Interval Events: Patient was seen and examined at bedside. Patient intubated. Unable to obtain ROS.      Hospital Medications:  chlorhexidine 0.12% Liquid 15 milliLiter(s) Oral Mucosa every 12 hours  chlorhexidine 2% Cloths 1 Application(s) Topical daily  fentaNYL   Infusion. 1 MICROgram(s)/kG/Hr IV Continuous <Continuous>  lactulose Syrup 30 Gram(s) Oral every 6 hours  meropenem  IVPB 1000 milliGRAM(s) IV Intermittent every 8 hours  midodrine. 10 milliGRAM(s) Oral three times a day  mupirocin 2% Nasal 1 Application(s) Both Nostrils two times a day  norepinephrine Infusion 0.05 MICROgram(s)/kG/Min IV Continuous <Continuous>  nystatin Powder 1 Application(s) Topical two times a day  pantoprazole  Injectable 40 milliGRAM(s) IV Push two times a day  polyethylene glycol 3350 17 Gram(s) Oral daily  rifAXIMin 550 milliGRAM(s) Oral two times a day  sodium chloride 0.9% lock flush 10 milliLiter(s) IV Push every 1 hour PRN  vancomycin  IVPB 1000 milliGRAM(s) IV Intermittent every 12 hours      ROS:   Unable to obtain 2/2 patient condition    PHYSICAL EXAM:   Vital Signs:  Vital Signs Last 24 Hrs  T(C): 37.2 (26 Oct 2020 13:30), Max: 37.2 (26 Oct 2020 10:30)  T(F): 98.9 (26 Oct 2020 13:30), Max: 99 (26 Oct 2020 11:45)  HR: 113 (26 Oct 2020 13:45) (92 - 120)  BP: 94/70 (26 Oct 2020 13:30) (80/62 - 100/78)  BP(mean): 75 (26 Oct 2020 13:30) (67 - 787)  RR: 24 (26 Oct 2020 13:45) (19 - 24)  SpO2: 100% (26 Oct 2020 13:45) (94% - 100%)  Daily     Daily Weight in k (26 Oct 2020 07:15)    GENERAL: critical  NEURO: sedated  CHEST: intubated, on vent, b/l air entry  CARDIAC: tachycardic  ABDOMEN: soft, non-tender, non-distended, no rebound or guarding, BS+  EXTREMITIES: no edema    LABS: reviewed    LABS: reviewed                        9.6    3.70  )-----------( 48       ( 26 Oct 2020 06:39 )             28.6     10-26    151<H>  |  123<H>  |  10  ----------------------------<  71  3.2<L>   |  23  |  0.98    Ca    8.4      26 Oct 2020 06:39  Phos  1.7     10-26  Mg     2.1     10-26    TPro  4.3<L>  /  Alb  2.1<L>  /  TBili  3.1<H>  /  DBili  x   /  AST  104<H>  /  ALT  85<H>  /  AlkPhos  115  10-26    LIVER FUNCTIONS - ( 26 Oct 2020 06:39 )  Alb: 2.1 g/dL / Pro: 4.3 g/dL / ALK PHOS: 115 U/L / ALT: 85 U/L DA / AST: 104 U/L / GGT: x             Interval Diagnostic Studies: see sunrise for full report

## 2020-10-26 NOTE — PROGRESS NOTE ADULT - ASSESSMENT
AS PER ICU     65y Female from home, lives with daughter, ambulates with walker with PMH of recurrent SBO's, s/p exp lap, SB resection in 2015, ex lap, ALBINA in 2018, DVT, PE, on Xarelto, IVC filter, chronic leg swelling, and anasarca, came in to the ED due to hypotension during office visit. Patient was found to be bacteremic with bacteroids fragilis. Admitted in ICU due to hypotension and hypothermia. On pressors meropenem , midodrine and Solu-cortef.  BCx X2 negative.     Diagnosis:  >Encephalopathy  >Sepsis  >Bacteremia  >Anemia  >DVT/PE  >Transaminitis  >Aspiration pneumonia vs HCAP    --------------------------------------------Neuro--------------------------------------  -She is AAOx2 at baseline on evaluation.  -Encephalopathic and got intubated ion 10/24  -Ammonia trending down from 152 ro 130.cont to trend  -Started on lactulose 30g q 6h and rifaximin   -Carl hagger for hypothermia  -f/u ammonia, cbc and PT/INR and APTT      -------------------------------------CVS-------------------------------  -Patient is hypotensive and hypothermic secondary to sepsis.  -c/w with meropenem   - NG tube feeding   -s/p albumin  -Midodrine 10 mg TID  -RIJ for pressors on levophed  -Hold Lasix home med in the setting of hypotension      --------------------------------Respiratory----------------------  -Pt is tachypneic but not in distress  -CT showed mild b/l pleural effusion and left lower lobe consolidation  -HCAP vs aspiration penumonia  -f/u sputum culture  -c/w meropenem  -ID Dr. Patricio    ----------------------------------------Gastrointestinal--------------------------------------  -Patient was bacteremic with bacteroids fragilis, likely secondary from intra abdominal source.  -Patient had multiple SBO in past. Ct abdomen on admission showed ileus, Repeat CT A/P showed ileus and non occlusive ischemic colitis  -No signs of acute abdomen   -Surgery recs > No intervention, pt was admitted in Jan diagnosed with budd Chiari syndrome with portal HTN recs to transfer to [facility] but pt is unstable now  -LFTs are mildly elevated since admission, trending down. Likely due to sepsis.  -C/w meropenem  -BCx X 2 negative.  -ammonia trends down from 150 to 130, cont to trend, on lactulose and rifaximin   -had 1 BM after enema and lactulose, monitor BM  -f/u ammonia  -GI, DR Chatterjee deferred doing colonoscopy due to multiple comorbidities, FOBT positive on 10/15  -Dr Potts recs has been noted    -----------------------------------------ID---------------------------------------  -Patient is bacteremic with bacteroids likely Gi source.  -Lactate is normal 1.3  -c/w meropenem  - BP on the lower side, pt is on Levophed midodrine  -Monitor vitals Q4      ---------------------------------------------Nephro-------------------------------------  -Kidney function is normal, Cl is 120 with  sodium trends up from 147 to 150  -cont with D5 and 1/2 NS  -Monitor electrolytes  -Monitor respiratory status    ----------------------------------------Hem Onc----------------------------------  -Has h/o DVt and PE in past. Patient is on Xarelto  -Patient has anemia  -anemia of chronic disease  -Hgb 6.9 1PRBC >8.7 >7 1PRBC> 8.5  -Plat trends up from 68 to 73  -Folate and B12 normal.   -Monitor CBC    ----------------------------------------Endo--------------------------------  -HgbA1c 4    ----------------------------------------Prophylaxis----------------------------  DVT: AC on hold  GI: PPI    ---------------------------------------GOC---------------------------  Full code

## 2020-10-26 NOTE — PROGRESS NOTE ADULT - ASSESSMENT
Patient is a 65y old  Female from home, lives with daughter, ambulates with walker with PMH of recurrent SBO's, s/p exp lap, SB resection in 2015, ex lap, ALBINA in 2018, DVT, PE, on Xarelto, IVC filter, chronic leg swelling, and anasarca, Now send in to the ER by her PCP, Dr. Ross, for evaluation of  generalized weakness and hypotension BP of 80/40 during office visit. On admission, she found to have no fever and BP was in lower normal range and no Leukocytosis. The CXR is clear and CT abd/pelvis consistent with Ileus. The ID consult requested to assist with evaluation of infectious etiology of episode of hypotension. Found to have Bacteroides bacteremia and now developed septic shock on Cefepime and Flagyl. Hence transferred to ICU. 10/20/20.    # Hypotension  at office- BP of 80/40 - resolved   #  Bacteroides fragilis Bacteremia - 10/13/20 - ? source most likely GI- Repeat Blood Cxs have NGTD 10/16/20  # Ileus  - h/o recurrent SBO and s/p EXp. LAp  # COVID 19 negative   # Septic shock ( Hypothermia + hypotensive)- transferred to ICU since requiring pressor- source GI, The CT abd/pelvis shows nonspecific enterocolitis, noninfectious inflammatory bowel disease, or ischemic bowel, along with ileus.   # Pneumonia- HCAP vs Aspiration - on CXR 10/24 and CT chest 10/21      would recommend:    1. Follow up final sputum culture  2. Continue IV Vancomycin since MRSA PCR is positive and Meropenem   3. Aspiration precaution  4. Monitor kidney function and adjust Abx doses accordingly   5.  Management of Vent as per ICU protocol    d/w ICU team       Attending Attestation:    Spent more than 45 minutes on total encounter, more than 50 % of the visit was spent counseling and/or coordinating care by the Attending physician.

## 2020-10-26 NOTE — PROGRESS NOTE ADULT - ASSESSMENT
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  BC grew bacteroides.    Problem/Recommendation - 1:  Problem: Bacteremia. Recommendation: bacteroides  most likely GI origin  on antibiotics  he has hypothermia and hypotension and elevated lactic acid  in ICU now  on steroid and pressor    Problem/Recommendation - 2:  ·  Problem: Anemia.  Recommendation: ferritin, b12 folate normal  no hemolysis  most likley due to malnutrition from short bowel syndrome.   Problem/Recommendation - 3:  ·  Problem: Acute deep vein thrombosis (DVT) of other vein of upper extremity.  Recommendation: she has been on xarelto for 2 years.  DVT at left arm and left leg before.  off xarelto and lovenox due to elevated PT/PTT  4. lethargy  ?due to sepsis,  5. elevated PT/PTT due to malnutrition and antibiotics causing VITK def  now it is mostly recovered

## 2020-10-26 NOTE — PROGRESS NOTE ADULT - SUBJECTIVE AND OBJECTIVE BOX
Patient is seen and examined at the bed side, is Normothermic. She remains intubated and on pressor. The sputum culture is in process.        REVIEW OF SYSTEMS: Unable to obtain due to mental status         ALLERGIES: No Known Allergies      ICU Vital Signs Last 24 Hrs  T(C): 37 (26 Oct 2020 18:15), Max: 37.2 (26 Oct 2020 10:30)  T(F): 98.6 (26 Oct 2020 18:15), Max: 99 (26 Oct 2020 11:45)  HR: 106 (26 Oct 2020 19:00) (90 - 120)  BP: 99/78 (26 Oct 2020 19:00) (80/62 - 103/79)  BP(mean): 83 (26 Oct 2020 19:00) (67 - 787)  ABP: --  ABP(mean): --  RR: 24 (26 Oct 2020 19:00) (14 - 25)  SpO2: 100% (26 Oct 2020 18:45) (95% - 100%)        PHYSICAL EXAM:  GENERAL: Intubated /vented, on CARLOTTA HUGGER  CHEST/LUNG: Not using accessory muscles   HEART: s1 and s2 present  ABDOMEN:  Nontender and  Nondistended  EXTREMITIES: No pedal  edema  CNS: Intubated/vented          LABS:                        9.6    3.70  )-----------( 48       ( 26 Oct 2020 06:39 )             28.6                         6.9    6.51  )-----------( 111      ( 22 Oct 2020 13:40 )             20.8         10-26    150<H>  |  124<H>  |  10  ----------------------------<  89  4.1   |  24  |  1.05    Ca    8.1<L>      26 Oct 2020 15:02  Phos  2.5     10-26  Mg     2.1     10-26    TPro  4.3<L>  /  Alb  2.1<L>  /  TBili  3.1<H>  /  DBili  x   /  AST  104<H>  /  ALT  85<H>  /  AlkPhos  115  10-26    10-25    152<H>  |  123<H>  |  11  ----------------------------<  88  3.4<L>   |  23  |  1.21    Ca    8.5      25 Oct 2020 12:24  Phos  1.9     10-25  Mg     2.2     10-25    TPro  4.9<L>  /  Alb  2.6<L>  /  TBili  2.6<H>  /  DBili  x   /  AST  133<H>  /  ALT  94<H>  /  AlkPhos  116  10-25    10-24    150<H>  |  125<H>  |  11  ----------------------------<  149<H>  4.0   |  18<L>  |  0.96    Ca    8.2<L>      24 Oct 2020 06:16  Phos  2.7     10-24  Mg     2.3     10-24    TPro  4.8<L>  /  Alb  2.8<L>  /  TBili  2.6<H>  /  DBili  x   /  AST  188<H>  /  ALT  105<H>  /  AlkPhos  112  10-24      TPro  4.2<L>  /  Alb  2.4<L>  /  TBili  2.4<H>  /  DBili  x   /  AST  208<H>  /  ALT  87<H>  /  AlkPhos  105  10-22        Procalcitonin, Serum (10.15.20 @ 11:48)   Procalcitonin, Serum: 0.16: Procalcitonin (PCT) Interpretation (ng/mL) - Diagnosis of systemic   bacterial infection/sepsis         MEDICATIONS  (STANDING):    chlorhexidine 0.12% Liquid 15 milliLiter(s) Oral Mucosa every 12 hours  chlorhexidine 2% Cloths 1 Application(s) Topical daily  fentaNYL   Infusion. 1 MICROgram(s)/kG/Hr (5.62 mL/Hr) IV Continuous <Continuous>  lactulose Syrup 30 Gram(s) Oral every 6 hours  meropenem  IVPB 1000 milliGRAM(s) IV Intermittent every 8 hours  midodrine. 10 milliGRAM(s) Oral three times a day  mupirocin 2% Nasal 1 Application(s) Both Nostrils two times a day  norepinephrine Infusion 0.05 MICROgram(s)/kG/Min (2.63 mL/Hr) IV Continuous <Continuous>  nystatin Powder 1 Application(s) Topical two times a day  pantoprazole  Injectable 40 milliGRAM(s) IV Push two times a day  polyethylene glycol 3350 17 Gram(s) Oral daily  rifAXIMin 550 milliGRAM(s) Oral two times a day  vancomycin  IVPB 1000 milliGRAM(s) IV Intermittent every 12 hours        RADIOLOGY & ADDITIONAL TESTS:    < from: Xray Chest 1 View- PORTABLE-Routine (Xray Chest 1 View- PORTABLE-Routine in AM.) (10.25.20 @ 09:21) >    Frontal expiratory view of the chest shows the heart to be similar in size. Endotracheal tube, right jugular line and feeding tube remain present.    The lungs show similar left upper lobe infiltrate with progression of right perihilar infiltrate. Pleural effusions are similar. There is no evidence of pneumothorax.    < end of copied text >    10/23/20 : Xray Chest 1 View-PORTABLE IMMEDIATE (Xray Chest 1 View-PORTABLE IMMEDIATE .) (10.23.20 @ 19:09) Feeding tube tip near GE junction as noted. Questionable right upper lobe infiltrate. Follow-up study is recommended as clinically warranted.      10/21/20 : CT Abdomen and Pelvis w/ Oral Cont and w/ IV Cont (10.21.20 @ 15:59) Mural thickening of the left colon and rectum. Liquid stool in the colon. Apparent segmental mural thickening of the mid to distal small bowel and the proximal duodenum. Findings may represent nonspecific enterocolitis, noninfectious inflammatory bowel disease, or ischemic bowel. Clinical correlation is recommended. The celiac axis artery, SMA, and KINA are patent without stenosis.    Dilatation of the mid small bowel is again noted. Oral contrast has reached the terminal ileum. Findings may represent small bowel obstruction, or ileus related to nonspecific enterocolitis.   Cholelithiasis.    Moderate to large ascites in the abdomen, increased since the previous examination. 5 mm nonspecific noncalcified left upper lobe lung nodule; if the patient's is in the high risk category (i.e. smoker), follow-up chest CT may be pursued in 12 months to ensure stability.    Combination of atelectasis and consolidation in the left lower lobe.    Possible 1.0 cm hypodense lesion in the left lobe of the thyroid. Thyroid ultrasound may be pursued for further evaluation.   Mild bilateral pleural effusions.    Aging determinate compression fracture at T5 vertebra.        10/13/20 : CT Abdomen and Pelvis w/ IV Cont (10.13.20 @ 18:50) Diffuse dilatation of small bowel loops without a clear transition is slightly increased, likely an ileus.  Decreased moderate ascites.    1.5 cm pancreatic versus peripancreatic soft tissue nodule    10/13/20 : Xray Chest 1 View- PORTABLE-Urgent (Xray Chest 1 View- PORTABLE-Urgent .) (10.13.20 @ 16:34) Clear lungs.          MICROBIOLOGY DATA:    Culture - Sputum . (10.26.20 @ 00:26)   Gram Stain:  Moderate polymorphonuclear leukocytes per low power field   Rare Squamous epithelial cells per low power field   Moderate Gram Positive Cocci in Clusters per oil power field   Moderate Yeast per oil power field   Specimen Source: .Sputum Sputum     MRSA/MSSA PCR (10.21.20 @ 09:41)   MRSA PCR Result.: Detected:       Culture - Blood (10.20.20 @ 22:09)   Specimen Source: .Blood Blood   Culture Results:  No growth to date.     Culture - Blood (10.20.20 @ 22:09)   Specimen Source: .Blood Blood   Culture Results:   No growth to date.     Culture - Blood in AM (10.16.20 @ 10:13)   Specimen Source: .Blood Blood-Peripheral   Culture Results:   No growth to date.     Culture - Blood in AM (10.16.20 @ 10:13)   Specimen Source: .Blood Blood-Peripheral   Culture Results:   No growth to date.     Culture - Blood (10.13.20 @ 22:23)   Growth in anaerobic bottle: Gram Negative Rods   Specimen Source: .Blood Blood-Peripheral   Organism: Blood Culture PCR   Culture Results:   Growth in anaerobic bottle: Bacteroides fragilis   "Susceptibilities not performed"   ***Blood Panel PCR results on this specimen are available   approximately 3 hours after the Gram stain result.***   Gram stain, PCR, and/or culture results may not always   correspond due to difference in methodologies.     Culture - Blood (10.13.20 @ 22:23)   Specimen Source: .Blood Blood-Peripheral   Culture Results:   No growth to date.     Urine Microscopic-Add On (NC) (10.13.20 @ 21:39)   Bacteria: Moderate /HPF   Epithelial Cells: Moderate /HPF   Red Blood Cell - Urine: 5-10 /HPF   White Blood Cell - Urine: 6-10 /HPF

## 2020-10-26 NOTE — PROGRESS NOTE ADULT - ASSESSMENT
65y Female from home, lives with daughter, ambulates with walker with PMH of recurrent SBO's, s/p exp lap, SB resection in 2015, ex lap, ALBINA in 2018, DVT, PE, on Xarelto, IVC filter, chronic leg swelling, and anasarca, came in to the ED due to hypotension during office visit. Patient was found to be bacteremic with bacteroids fragilis. Admitted in ICU due to hypotension and hypothermia. On pressors meropenem , midodrine and Solu-cortef.  BCx X2 negative.     Diagnosis:  >Encephalopathy  >Sepsis  >Bacteremia  >Anemia  >DVT/PE  >Transaminitis  >Aspiration pneumonia vs HCAP    --------------------------------------------Neuro--------------------------------------  -She is AAOx2 at baseline on evaluation.  -Encephalopathic and got intubated ion 10/24  -Ammonia trending down from 152 > 130 > 116. Cont to trend   -On lactulose 30g q 6h and rifaximin   -Carl barrientos for hypothermia  -f/u ammonia, cbc and PT/INR and APTT      -------------------------------------CVS-------------------------------  -Patient is hypotensive and hypothermic secondary to sepsis.  -c/w with meropenem   - NG tube feeding   -s/p albumin  -Midodrine 10 mg TID  -RIJ for pressors on levophed  -Hold Lasix home med in the setting of hypotension      --------------------------------Respiratory----------------------  -Pt is tachypneic but not in distress  -CT showed mild b/l pleural effusion and left lower lobe consolidation  -HCAP vs aspiration penumonia  -f/u sputum culture  -c/w meropenem  -ID Dr. Patricio    ----------------------------------------Gastrointestinal--------------------------------------  -Patient was bacteremic with bacteroids fragilis, likely secondary from intra abdominal source.  -Patient had multiple SBO in past. Ct abdomen on admission showed ileus, Repeat CT A/P showed ileus and non occlusive ischemic colitis  -No signs of acute abdomen   -Surgery recs > No intervention, pt was admitted in Jan diagnosed with budd Chiari syndrome with portal HTN recs to transfer to [facility] but pt is unstable now  -LFTs are mildly elevated since admission, trending down. Likely due to sepsis.  -C/w meropenem  -BCx X 2 negative.  -ammonia trends down from 150 to 130, cont to trend, on lactulose and rifaximin   -had 1 BM after enema and lactulose, monitor BM  -f/u ammonia  -GI, DR Chatterjee deferred doing colonoscopy due to multiple comorbidities, FOBT positive on 10/15  -Dr Potts recs has been noted    -----------------------------------------ID---------------------------------------  -Patient is bacteremic with bacteroids likely Gi source.  -Lactate is normal 1.3  -c/w meropenem  - BP on the lower side, pt is on Levophed midodrine  -Monitor vitals Q4      ---------------------------------------------Nephro-------------------------------------  -Kidney function is normal, Cl is 120 with  sodium trends up from 147 to 150  -cont with D5 and 1/2 NS  -Monitor electrolytes  -Monitor respiratory status    ----------------------------------------Hem Onc----------------------------------  -Has h/o DVt and PE in past. Patient is on Xarelto  -Patient has anemia  -anemia of chronic disease  -Hgb 6.9 1PRBC >8.7 >7 1PRBC> 8.5  -Plat trends up from 68 to 73  -Folate and B12 normal.   - Restart lovenox  -Monitor CBC    ----------------------------------------Endo--------------------------------  -HgbA1c 4    ----------------------------------------Prophylaxis----------------------------  DVT: Restart full dose lovenox as Hb stable.   GI: PPI    ---------------------------------------GOC---------------------------  Full code       65y Female from home, lives with daughter, ambulates with walker with PMH of recurrent SBO's, s/p exp lap, SB resection in 2015, ex lap, ALBINA in 2018, DVT, PE, on Xarelto, IVC filter, chronic leg swelling, and anasarca, came in to the ED due to hypotension during office visit. Patient was found to be bacteremic with bacteroids fragilis. Admitted in ICU due to hypotension and hypothermia. On pressors meropenem , midodrine and Solu-cortef.  BCx X2 negative.     Diagnosis:  >Encephalopathy  >Sepsis  >Bacteremia  >Anemia  >DVT/PE  >Transaminitis  >Aspiration pneumonia vs HCAP    --------------------------------------------Neuro--------------------------------------  -She is AAOx2 at baseline on evaluation.  -Encephalopathic and got intubated on 10/24  -Ammonia trending down from 152 > 130 > 116 >103. Cont to trend   -On lactulose 30g q 6h and rifaximin   -Carl barrientos for hypothermia  -INR> 1.63      -------------------------------------CVS-------------------------------  -Patient is hypotensive and hypothermic secondary to sepsis.  -c/w with meropenem   - NG tube feeding   -s/p albumin  -Midodrine 10 mg TID  -RIJ for pressors on levophed  -Hold Lasix home med in the setting of hypotension      --------------------------------Respiratory----------------------  -Intubated  -CT showed mild b/l pleural effusion and left lower lobe consolidation  -HCAP vs aspiration penumonia  - sputum culture grew +ve cocci clusters   -c/w meropenem and vancomycin   -ID Dr. Patricio    ----------------------------------------Gastrointestinal--------------------------------------  -Patient was bacteremic with bacteroids fragilis, likely secondary from intra abdominal source.  -Patient had multiple SBO in past. Ct abdomen on admission showed ileus, Repeat CT A/P showed ileus and non occlusive ischemic colitis  -No signs of acute abdomen   -Surgery recs > No intervention, pt was admitted in Jan diagnosed with budd Chiari syndrome with portal HTN recs to transfer to [facility] but pt is unstable now  -LFTs are mildly elevated since admission, trending down. Likely due to sepsis vs hepatic failure sec to budd chiari  -C/w meropenem  -BCx X 2 negative.  -ammonia trends down from 150 to 130 >116>103, cont to trend, on lactulose and rifaximin   -had 1 BM after enema and lactulose, monitor BM  -GI, DR Chatterjee deferred doing colonoscopy due to multiple comorbidities, FOBT positive on 10/15  -Dr Potts recs has been noted    -----------------------------------------ID---------------------------------------  -Patient was bacteremic with bacteroids likely Gi source.  -Lactate is normal 1.3  -c/w meropenem and vanc  - BP on the lower side, pt is on Levophed midodrine  -Monitor vitals Q4      ---------------------------------------------Nephro-------------------------------------  -Kidney function is normal, Cl is 120 with  sodium trends up from 147>150.152>152>151  -TF  -free water 250q4  -bmp in afternoon  -Monitor respiratory status    ----------------------------------------Hem Onc----------------------------------  -Has h/o DVt and PE in past. Patient is on Xarelto  -Patient has anemia  -anemia of chronic disease  -Hgb 6.9 1PRBC >8.7 >7 1PRBC> 8.5>1 PRBC> 8.5>9.7>9.6  -Plat trends down to 016[address]  -f/u cbc  -Folate and B12 normal.   - Restart lovenox  -Monitor CBC    ----------------------------------------Endo--------------------------------  -HgbA1c 4    ----------------------------------------Prophylaxis----------------------------  DVT: hold Lovenox for low plateltets  GI: PPI    ---------------------------------------GOC---------------------------  Full code

## 2020-10-26 NOTE — PROGRESS NOTE ADULT - ASSESSMENT
66 yo Female with Hx of recurrent SBO`s s/p exp lap, small bowel resection in 2015, ex lap, ALBINA in 2018, DVT, PE on Xarelto, IVC filter and s/p supra-hepatic IVC thrombosis/occlusion, anemia, anasarca was sent to ED by PCP for hypotension, generalized weakness and chills and was admitted for hypotension, r/p sepsis on 10/13/2020, found to have Bacteroides fragilis bacteremia, suspected GI source, was treated with cefepime + metronidazole, remained hypothermic, hypotensive, was transferred to ICU and switched to meropenem on 10/21. Repeat CT 10/21 suggested  non-specific enterocolitis, noninfectious inflammatory bowel disease or ischemic bowel along with ileus, and concern for SBO given mild luminal narrowing at distal small bowel. Patient remained hypotensive, hypothermic, requiring pressor support, septic and got intubated on 10/24 and concern for HCAP vs. aspiration.  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 Liver enzymes are up-trending, along with bilirubin, possibly due to ongoing sepsis.       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  (prior thrombosis workup?)  - No caudate lobe hypertrophy reported (present in 75% of cases), no macroregenerative nodules reported, characteristic pattern of parenchymal perfusion not described,, but study reported limited due to motion artifact   - Venography would be gold standard for diagnosis, d/w IR, if patient will be more stable (possibly outpatient), can be evaluated at [facility] for Dx and potential intervention, this time patient is septic on pressor support  - was already on full dose anticoagulation, was being held b/o coagulopathy     #Abnormal liver tests   - most likely due to ongoing sepsis (only mildly elevated or normal on admission and started to worsen when patient condition deteriorated), mgmt. of sepsis per ICU  - bilirubin mildly elevated but had severe coagulopathy  - can check Factor VIII     # Ascites   - Dx paracentesis could be useful given ongoing sepsis (last 2 sets of BCx nl) but as d/w IR, no safe window based on last CT   (last was in 2/2019, when SAAG < 1.1 serum alb 1.0, fluid alb < 0.2, total prot < 1)     # Encephalopathy (even elevated ammonia can be multifactorial)  - c/w lactulose and c/w rifaximin   - consider CT head (on full dose AC)    # Anemia, thrombocytopenia   - possibly malnutrition plus chronic GI loss and sepsis  - hematology F/u appreciated  - FOBT pos, GI f/u    Will continue to follow  Thank you for the consult

## 2020-10-26 NOTE — ADVANCED PRACTICE NURSE CONSULT - ASSESSMENT
This is a 65yr old female patient admitted for Hypotension, presenting with the following:  -There is evidence of Ruptured Bullae to the Bilateral Groin and Medial Thigh areas with red tissue, drainage, periwound pitting edema & warmth  -There is evidence of Bilateral Upper Extremity weeping edema  -There is evidence of an intact Bullae to the L. Ant Foot without drainage  -There is evidence of Moisture Associated Skin Damage to the Perianal area  -The patient is currently hemodynamically unstable This is a 65yr old female patient admitted for Hypotension, presenting with the following:  -There is evidence of Ruptured Bullae to the Bilateral Groin and Medial Thigh and Bilateral Elbows areas with red tissue, drainage, periwound pitting edema & warmth  -There is evidence of Bilateral Upper Extremity weeping edema  -There is evidence of an intact Bullae to the L. Ant Foot without drainage  -There is evidence of Moisture Associated Skin Damage to the Perianal area  -The patient is currently hemodynamically unstable and exhibiting signs of skin failure

## 2020-10-27 NOTE — PROGRESS NOTE ADULT - SUBJECTIVE AND OBJECTIVE BOX
INTERVAL HPI/OVERNIGHT EVENTS: ***    PRESSORS: [ ] YES [ ] NO  WHICH:    ANTIBIOTICS:                      Antimicrobial:  meropenem  IVPB 1000 milliGRAM(s) IV Intermittent every 8 hours  rifAXIMin 550 milliGRAM(s) Oral two times a day  vancomycin  IVPB 1000 milliGRAM(s) IV Intermittent every 12 hours    Cardiovascular:  midodrine. 10 milliGRAM(s) Oral three times a day  norepinephrine Infusion 0.05 MICROgram(s)/kG/Min IV Continuous <Continuous>    Pulmonary:    Hematalogic:    Other:  chlorhexidine 0.12% Liquid 15 milliLiter(s) Oral Mucosa every 12 hours  chlorhexidine 2% Cloths 1 Application(s) Topical daily  fentaNYL   Infusion. 1 MICROgram(s)/kG/Hr IV Continuous <Continuous>  lactulose Syrup 30 Gram(s) Oral every 6 hours  nystatin Powder 1 Application(s) Topical two times a day  pantoprazole  Injectable 40 milliGRAM(s) IV Push two times a day  polyethylene glycol 3350 17 Gram(s) Oral daily  sodium chloride 0.9% lock flush 10 milliLiter(s) IV Push every 1 hour PRN    chlorhexidine 0.12% Liquid 15 milliLiter(s) Oral Mucosa every 12 hours  chlorhexidine 2% Cloths 1 Application(s) Topical daily  fentaNYL   Infusion. 1 MICROgram(s)/kG/Hr IV Continuous <Continuous>  lactulose Syrup 30 Gram(s) Oral every 6 hours  meropenem  IVPB 1000 milliGRAM(s) IV Intermittent every 8 hours  midodrine. 10 milliGRAM(s) Oral three times a day  norepinephrine Infusion 0.05 MICROgram(s)/kG/Min IV Continuous <Continuous>  nystatin Powder 1 Application(s) Topical two times a day  pantoprazole  Injectable 40 milliGRAM(s) IV Push two times a day  polyethylene glycol 3350 17 Gram(s) Oral daily  rifAXIMin 550 milliGRAM(s) Oral two times a day  sodium chloride 0.9% lock flush 10 milliLiter(s) IV Push every 1 hour PRN  vancomycin  IVPB 1000 milliGRAM(s) IV Intermittent every 12 hours    Drug Dosing Weight  Height (cm): 167.6 (21 Oct 2020 02:26)  Weight (kg): 56.2 (21 Oct 2020 02:26)  BMI (kg/m2): 20 (21 Oct 2020 02:26)  BSA (m2): 1.63 (21 Oct 2020 02:26)    CENTRAL LINE: [ ] YES [ ] NO  LOCATION:   DATE INSERTED:  REMOVE: [ ] YES [ ] NO  EXPLAIN:    TREJO: [ ] YES [ ] NO    DATE INSERTED:  REMOVE:  [ ] YES [ ] NO  EXPLAIN:    A-LINE:  [ ] YES [ ] NO  LOCATION:   DATE INSERTED:  REMOVE:  [ ] YES [ ] NO  EXPLAIN:    PMH -reviewed admission note, no change since admission    ICU Vital Signs Last 24 Hrs  T(C): 35.3 (27 Oct 2020 07:15), Max: 37.2 (26 Oct 2020 10:30)  T(F): 95.6 (27 Oct 2020 07:15), Max: 99 (26 Oct 2020 11:45)  HR: 103 (27 Oct 2020 08:45) (90 - 120)  BP: 101/75 (27 Oct 2020 08:45) (80/62 - 112/64)  BP(mean): 81 (27 Oct 2020 08:45) (67 - 787)  ABP: --  ABP(mean): --  RR: 24 (27 Oct 2020 08:45) (14 - 57)  SpO2: 98% (27 Oct 2020 08:45) (98% - 100%)      ABG - ( 27 Oct 2020 03:36 )  pH, Arterial: 7.40  pH, Blood: x     /  pCO2: 34    /  pO2: 200   / HCO3: 21    / Base Excess: -3.2  /  SaO2: 98                    10-26 @ 07:01  -  10-27 @ 07:00  --------------------------------------------------------  IN: 1848.4 mL / OUT: 420 mL / NET: 1428.4 mL        Mode: AC/ CMV (Assist Control/ Continuous Mandatory Ventilation)  RR (machine): 24  TV (machine): 375  FiO2: 50  PEEP: 5  ITime: 1  MAP: 11  PIP: 29      PHYSICAL EXAM:    GENERAL: NAD, well-groomed, well-developed  HEAD:  Atraumatic, Normocephalic  EYES: EOMI, PERRLA, conjunctiva and sclera clear  ENMT: No tonsillar erythema, exudates, or enlargement; Moist mucous membranes, Good dentition, No lesions  NECK: Supple, normal appearance, No JVD; Normal thyroid; Trachea midline  NERVOUS SYSTEM:  Alert & Oriented X3, Good concentration; Motor Strength 5/5 B/L upper and lower extremities; DTRs 2+ intact and symmetric  CHEST/LUNG: No chest deformity; Normal percussion bilaterally; No rales, rhonchi, wheezing   HEART: Regular rate and rhythm; No murmurs, rubs, or gallops  ABDOMEN: Soft, Nontender, Nondistended; Bowel sounds present  EXTREMITIES:  2+ Peripheral Pulses, No clubbing, cyanosis, or edema  LYMPH: No lymphadenopathy noted  SKIN: No rashes or lesions; Good capillary refill      LABS:  CBC Full  -  ( 27 Oct 2020 06:44 )  WBC Count : 6.80 K/uL  RBC Count : 3.21 M/uL  Hemoglobin : 10.1 g/dL  Hematocrit : 29.9 %  Platelet Count - Automated : 43 K/uL  Mean Cell Volume : 93.1 fl  Mean Cell Hemoglobin : 31.5 pg  Mean Cell Hemoglobin Concentration : 33.8 gm/dL  Auto Neutrophil # : x  Auto Lymphocyte # : x  Auto Monocyte # : x  Auto Eosinophil # : x  Auto Basophil # : x  Auto Neutrophil % : x  Auto Lymphocyte % : x  Auto Monocyte % : x  Auto Eosinophil % : x  Auto Basophil % : x    10-27    148<H>  |  119<H>  |  9   ----------------------------<  76  4.0   |  24  |  0.89    Ca    8.1<L>      27 Oct 2020 06:44  Phos  2.4     10-27  Mg     2.1     10-27    TPro  4.4<L>  /  Alb  2.0<L>  /  TBili  4.2<H>  /  DBili  x   /  AST  85<H>  /  ALT  75<H>  /  AlkPhos  116  10-27    PT/INR - ( 26 Oct 2020 06:39 )   PT: 19.0 sec;   INR: 1.63 ratio         PTT - ( 26 Oct 2020 06:39 )  PTT:47.6 sec    Culture Results:   Moderate Staphylococcus aureus  Normal Respiratory Erin present (10-26 @ 00:26)      RADIOLOGY & ADDITIONAL STUDIES REVIEWED:  ***    GOALS OF CARE DISCUSSION WITH PATIENT/FAMILY/PROXY:    CRITICAL CARE TIME SPENT: 35 minutes INTERVAL HPI/OVERNIGHT EVENTS: No active issue overnight. Pt had 2 BM in the morning. Pt was little bit alert as compare to yesterday. She is in fluid overload , will give her Lasix and albumin to maintain the BP. Pt has tendency to develop clots as per heme/onc but the platelet has been trending down with the deranged PT/INR and APTT. Will hold the AC for now. Will closely monitor the pt.       ABGs: 7.4/30/200/21  Vent: 24/375/50/5  On Pressors: Levophed   On Fentanyl    ANTIBIOTICS:                      Antimicrobial:  meropenem  IVPB 1000 milliGRAM(s) IV Intermittent every 8 hours  rifAXIMin 550 milliGRAM(s) Oral two times a day  vancomycin  IVPB 1000 milliGRAM(s) IV Intermittent every 12 hours    Cardiovascular:  midodrine. 10 milliGRAM(s) Oral three times a day  norepinephrine Infusion 0.05 MICROgram(s)/kG/Min IV Continuous <Continuous>    Pulmonary:    Hematalogic:    Other:  chlorhexidine 0.12% Liquid 15 milliLiter(s) Oral Mucosa every 12 hours  chlorhexidine 2% Cloths 1 Application(s) Topical daily  fentaNYL   Infusion. 1 MICROgram(s)/kG/Hr IV Continuous <Continuous>  lactulose Syrup 30 Gram(s) Oral every 6 hours  nystatin Powder 1 Application(s) Topical two times a day  pantoprazole  Injectable 40 milliGRAM(s) IV Push two times a day  polyethylene glycol 3350 17 Gram(s) Oral daily  sodium chloride 0.9% lock flush 10 milliLiter(s) IV Push every 1 hour PRN    chlorhexidine 0.12% Liquid 15 milliLiter(s) Oral Mucosa every 12 hours  chlorhexidine 2% Cloths 1 Application(s) Topical daily  fentaNYL   Infusion. 1 MICROgram(s)/kG/Hr IV Continuous <Continuous>  lactulose Syrup 30 Gram(s) Oral every 6 hours  meropenem  IVPB 1000 milliGRAM(s) IV Intermittent every 8 hours  midodrine. 10 milliGRAM(s) Oral three times a day  norepinephrine Infusion 0.05 MICROgram(s)/kG/Min IV Continuous <Continuous>  nystatin Powder 1 Application(s) Topical two times a day  pantoprazole  Injectable 40 milliGRAM(s) IV Push two times a day  polyethylene glycol 3350 17 Gram(s) Oral daily  rifAXIMin 550 milliGRAM(s) Oral two times a day  sodium chloride 0.9% lock flush 10 milliLiter(s) IV Push every 1 hour PRN  vancomycin  IVPB 1000 milliGRAM(s) IV Intermittent every 12 hours    Drug Dosing Weight  Height (cm): 167.6 (21 Oct 2020 02:26)  Weight (kg): 56.2 (21 Oct 2020 02:26)  BMI (kg/m2): 20 (21 Oct 2020 02:26)  BSA (m2): 1.63 (21 Oct 2020 02:26)    CENTRAL LINE: [ ] YES [ ] NO  LOCATION:   DATE INSERTED:  REMOVE: [ ] YES [ ] NO  EXPLAIN:    TREJO: [ ] YES [ ] NO    DATE INSERTED:  REMOVE:  [ ] YES [ ] NO  EXPLAIN:    A-LINE:  [ ] YES [ ] NO  LOCATION:   DATE INSERTED:  REMOVE:  [ ] YES [ ] NO  EXPLAIN:    PMH -reviewed admission note, no change since admission    ICU Vital Signs Last 24 Hrs  T(C): 35.3 (27 Oct 2020 07:15), Max: 37.2 (26 Oct 2020 10:30)  T(F): 95.6 (27 Oct 2020 07:15), Max: 99 (26 Oct 2020 11:45)  HR: 103 (27 Oct 2020 08:45) (90 - 120)  BP: 101/75 (27 Oct 2020 08:45) (80/62 - 112/64)  BP(mean): 81 (27 Oct 2020 08:45) (67 - 787)  ABP: --  ABP(mean): --  RR: 24 (27 Oct 2020 08:45) (14 - 57)  SpO2: 98% (27 Oct 2020 08:45) (98% - 100%)      ABG - ( 27 Oct 2020 03:36 )  pH, Arterial: 7.40  pH, Blood: x     /  pCO2: 34    /  pO2: 200   / HCO3: 21    / Base Excess: -3.2  /  SaO2: 98                    10-26 @ 07:01  -  10-27 @ 07:00  --------------------------------------------------------  IN: 1848.4 mL / OUT: 420 mL / NET: 1428.4 mL        Mode: AC/ CMV (Assist Control/ Continuous Mandatory Ventilation)  RR (machine): 24  TV (machine): 375  FiO2: 50  PEEP: 5  ITime: 1  MAP: 11  PIP: 29      PHYSICAL EXAM:    GENERAL: Intubated and sedated.  HEAD:  Atraumatic, Normocephalic  EYES: b/l reactive to light.   NECK: Supple, normal appearance, No JVD; Normal thyroid; Trachea midline  NERVOUS SYSTEM:  Awake on painful stimulus and on suctioning.   CHEST/LUNG: No chest deformity; b/l air entry with decrease breadth sounds on the lower lobes of lungs  HEART: Regular rate and rhythm; No murmurs  ABDOMEN: Soft, Nontender, mildly distended; Bowel sounds present  EXTREMITIES:  2+ Peripheral Pulses, No clubbing, edema of hands and lower ext until knee, UE cold  LYMPH: No lymphadenopathy noted  SKIN: No rashes or lesions; Good capillary refill      LABS:  CBC Full  -  ( 27 Oct 2020 06:44 )  WBC Count : 6.80 K/uL  RBC Count : 3.21 M/uL  Hemoglobin : 10.1 g/dL  Hematocrit : 29.9 %  Platelet Count - Automated : 43 K/uL  Mean Cell Volume : 93.1 fl  Mean Cell Hemoglobin : 31.5 pg  Mean Cell Hemoglobin Concentration : 33.8 gm/dL  Auto Neutrophil # : x  Auto Lymphocyte # : x  Auto Monocyte # : x  Auto Eosinophil # : x  Auto Basophil # : x  Auto Neutrophil % : x  Auto Lymphocyte % : x  Auto Monocyte % : x  Auto Eosinophil % : x  Auto Basophil % : x    10-27    148<H>  |  119<H>  |  9   ----------------------------<  76  4.0   |  24  |  0.89    Ca    8.1<L>      27 Oct 2020 06:44  Phos  2.4     10-27  Mg     2.1     10-27    TPro  4.4<L>  /  Alb  2.0<L>  /  TBili  4.2<H>  /  DBili  x   /  AST  85<H>  /  ALT  75<H>  /  AlkPhos  116  10-27    PT/INR - ( 26 Oct 2020 06:39 )   PT: 19.0 sec;   INR: 1.63 ratio         PTT - ( 26 Oct 2020 06:39 )  PTT:47.6 sec    Culture Results:   Moderate Staphylococcus aureus  Normal Respiratory Erin present (10-26 @ 00:26)      RADIOLOGY & ADDITIONAL STUDIES REVIEWED:  ***    GOALS OF CARE DISCUSSION WITH PATIENT/FAMILY/PROXY:    CRITICAL CARE TIME SPENT: 35 minutes

## 2020-10-27 NOTE — PROGRESS NOTE ADULT - SUBJECTIVE AND OBJECTIVE BOX
Chief Complaint:  Patient is a 65y old  Female who presents with a chief complaint of Hypotension (27 Oct 2020 13:50)      Reason for consult: r/o Budd Chiari Interval Events: Patient was seen and examined at bedside. Patient intubated. Unable to obtain ROS.      Hospital Medications:  albumin human 25% IVPB 50 milliLiter(s) IV Intermittent every 6 hours  chlorhexidine 0.12% Liquid 15 milliLiter(s) Oral Mucosa every 12 hours  chlorhexidine 2% Cloths 1 Application(s) Topical daily  fentaNYL   Infusion. 1 MICROgram(s)/kG/Hr IV Continuous <Continuous>  lactulose Syrup 30 Gram(s) Oral every 6 hours  midodrine. 10 milliGRAM(s) Oral three times a day  norepinephrine Infusion 0.05 MICROgram(s)/kG/Min IV Continuous <Continuous>  nystatin Powder 1 Application(s) Topical two times a day  pantoprazole  Injectable 40 milliGRAM(s) IV Push two times a day  polyethylene glycol 3350 17 Gram(s) Oral daily  rifAXIMin 550 milliGRAM(s) Oral two times a day  sodium chloride 0.9% lock flush 10 milliLiter(s) IV Push every 1 hour PRN  vancomycin  IVPB 1000 milliGRAM(s) IV Intermittent every 12 hours      ROS:   Unable to obtain 2/2 patient condition.    PHYSICAL EXAM:   Vital Signs:  Vital Signs Last 24 Hrs  T(C): 35.2 (27 Oct 2020 11:00), Max: 37 (26 Oct 2020 18:15)  T(F): 95.3 (27 Oct 2020 11:00), Max: 98.6 (26 Oct 2020 18:15)  HR: 94 (27 Oct 2020 16:26) (88 - 113)  BP: 106/84 (27 Oct 2020 15:45) (95/74 - 116/89)  BP(mean): 89 (27 Oct 2020 15:45) (70 - 95)  RR: 24 (27 Oct 2020 15:45) (18 - 57)  SpO2: 100% (27 Oct 2020 16:26) (98% - 100%)  Daily     Daily Weight in k.2 (27 Oct 2020 07:00)    GENERAL: critical  NEURO: sedated  CHEST: intubated, on vent, b/l air entry  CARDIAC: tachycardic  ABDOMEN: soft, non-tender, non-distended, no rebound or guarding, BS+  EXTREMITIES: no edema    LABS: reviewed                        10.1   6.80  )-----------( 43       ( 27 Oct 2020 06:44 )             29.9     10-27    148<H>  |  119<H>  |  9   ----------------------------<  76  4.0   |  24  |  0.89    Ca    8.1<L>      27 Oct 2020 06:44  Phos  2.4     10-27  Mg     2.1     10-27    TPro  4.4<L>  /  Alb  2.0<L>  /  TBili  4.2<H>  /  DBili  x   /  AST  85<H>  /  ALT  75<H>  /  AlkPhos  116  10-27    LIVER FUNCTIONS - ( 27 Oct 2020 06:44 )  Alb: 2.0 g/dL / Pro: 4.4 g/dL / ALK PHOS: 116 U/L / ALT: 75 U/L DA / AST: 85 U/L / GGT: x             Interval Diagnostic Studies: see sunrise for full report

## 2020-10-27 NOTE — PROGRESS NOTE ADULT - ASSESSMENT
65y Female from home, lives with daughter, ambulates with walker with PMH of recurrent SBO's, s/p exp lap, SB resection in 2015, ex lap, ALBINA in 2018, DVT, PE, on Xarelto, IVC filter, chronic leg swelling, and anasarca, came in to the ED due to hypotension during office visit. Patient was found to be bacteremic with bacteroids fragilis. Admitted in ICU due to hypotension and hypothermia. On pressors meropenem , midodrine and Solu-cortef.  BCx X2 negative.     Diagnosis:  >Encephalopathy  >Sepsis  >Bacteremia  >Anemia  >DVT/PE  >Transaminitis  >Aspiration pneumonia vs HCAP    --------------------------------------------Neuro--------------------------------------  -She is AAOx2 at baseline on evaluation.  -Encephalopathic and got intubated on 10/24  -Ammonia trending down from 152 > 130 > 116 >103. Cont to trend   -On lactulose 30g q 6h and rifaximin   -Carl barrientos for hypothermia  -INR> 1.63      -------------------------------------CVS-------------------------------  -Patient is hypotensive and hypothermic secondary to sepsis.  -c/w with meropenem   - NG tube feeding   -s/p albumin  -Midodrine 10 mg TID  -RIJ for pressors on levophed  -Hold Lasix home med in the setting of hypotension      --------------------------------Respiratory----------------------  -Intubated  -CT showed mild b/l pleural effusion and left lower lobe consolidation  -HCAP vs aspiration penumonia  - sputum culture grew +ve cocci clusters   -c/w meropenem and vancomycin   -ID Dr. Patricio    ----------------------------------------Gastrointestinal--------------------------------------  -Patient was bacteremic with bacteroids fragilis, likely secondary from intra abdominal source.  -Patient had multiple SBO in past. 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 Likely due to sepsis vs hepatic failure sec to budd chiari  -C/w meropenem  -BCx X 2 negative.  -ammonia trends down from 150 to 130 >116>103, cont to trend, on lactulose and rifaximin   -had 1 BM after enema and lactulose, monitor BM  -GI, DR Chatterjee deferred doing colonoscopy due to multiple comorbidities, FOBT positive on 10/15  -Dr Potts recs has been noted    -----------------------------------------ID---------------------------------------  -Patient was bacteremic with bacteroids likely Gi source.  -Lactate is normal 1.3  -c/w meropenem and vanc  - BP on the lower side, pt is on Levophed midodrine  -Monitor vitals Q4      ---------------------------------------------Nephro-------------------------------------  -Kidney function is normal, Cl is 120 with  sodium trends up from 147>150.152>152>151  -TF  -free water 250q4  -bmp in afternoon  -Monitor respiratory status    ----------------------------------------Hem Onc----------------------------------  -Has h/o DVt and PE in past. Patient is on Xarelto  -Patient has anemia  -anemia of chronic disease  -Hgb 6.9 1PRBC >8.7 >7 1PRBC> 8.5>1 PRBC> 8.5>9.7>9.6  -Plat trends down to [address]  -f/u cbc  -Folate and B12 normal.   - Restart lovenox  -Monitor CBC    ----------------------------------------Endo--------------------------------  -HgbA1c 4    ----------------------------------------Prophylaxis----------------------------  DVT: hold Lovenox for low plateltets  GI: PPI    ---------------------------------------GOC---------------------------  Full code       65y Female from home, lives with daughter, ambulates with walker with PMH of recurrent SBO's, s/p exp lap, SB resection in 2015, ex lap, ALBINA in 2018, DVT, PE, on Xarelto, IVC filter, chronic leg swelling, and anasarca, came in to the ED due to hypotension during office visit. Patient was found to be bacteremic with bacteroids fragilis. Admitted in ICU due to hypotension and hypothermia. On pressors meropenem , midodrine and Solu-cortef.  BCx X2 negative.     Diagnosis:  >Encephalopathy  >Sepsis  >Bacteremia  >Anemia  >DVT/PE  >Transaminitis  >Aspiration pneumonia vs HCAP    --------------------------------------------Neuro--------------------------------------  -She is AAOx2 at baseline on evaluation.  -Encephalopathic and got intubated on 10/24  -Ammonia trending down from 152 > 130 > 116 >103. trend up today 131  -On lactulose 30g q 6h and rifaximin   -Carl maganaer for hypothermia, Temp 95.6  -INR> 1.63      -------------------------------------CVS-------------------------------  -Patient is hypotensive and hypothermic secondary to sepsis.  -c/w with meropenem and vancomycin   - NG tube feeding   -Midodrine 10 mg TID  -RIJ for pressors on levophed  -Hold Lasix home med in the setting of hypotension  -Pt is in fluid overload with decrease urine output, will give Lasix for fluid overload and albumin to maintain the BP  -f/u ECHO today, previous ECHO 3-4 months back showed normal EF      --------------------------------Respiratory----------------------  -Intubated and sedated  -CT showed mild b/l pleural effusion and left lower lobe consolidation  -HCAP vs aspiration pneumonia  - sputum culture grew +ve cocci clusters   -c/w meropenem and vancomycin   -f/u vanc trough today at 4pm  -ID Dr. Patricio    ----------------------------------------Gastrointestinal--------------------------------------  -Patient was bacteremic with bacteroids fragilis, likely secondary from intra abdominal source.  -Patient had multiple SBO in past. Ct abdomen on admission showed ileus, Repeat CT A/P showed ileus and non occlusive ischemic colitis  -No signs of acute abdomen   -Surgery recs > No intervention, pt was admitted in Jan diagnosed with budd Chiari syndrome with portal HTN recs to transfer to [facility] but pt is not stable   -LFTs are mildly elevated since admission, trending down. Likely due to sepsis vs hepatic failure sec to budd Chiari  -C/w meropenem and vanc (as nasopharyngeal swab grew MRSA , sputum prelim showed positive cocci in clusters).   -BCx X 2 negative.  -ammonia trends down from 150 to 130 >116>103, trend up today, on lactulose and rifaximin   -had 2 BM this morning, monitor BM  -GI, DR Chatterjee deferred doing colonoscopy due to multiple comorbidities, FOBT positive on 10/15  -Dr Potts recs has been noted    -----------------------------------------ID---------------------------------------  -Patient was bacteremic with bacteroids likely Gi source.  -Lactate is normal 1.3  -c/w meropenem and vanc  - BP on the lower side, pt is on Levophed baby dose and midodrine 10mg TID  -Monitor vitals Q4      ---------------------------------------------Nephro-------------------------------------  -Kidney function is normal, Cl is 120 with  sodium trends up from 147>150.152>152>151>148  -TF  -free water 250q4 increased to 300q4  -Monitor respiratory status    ----------------------------------------Hem Onc----------------------------------  -Has h/o DVt and PE in past. Patient is on Xarelto  -Patient has anemia  -anemia of chronic disease  -Hgb 6.9 1PRBC >8.7 >7 1PRBC> 8.5>1 PRBC> 8.5>9.7>9.6>10   -Plat trends down [id number]  -Hold AC for now, will closely monitor the patient.   -Folate and B12 normal.   -Monitor CBC    ----------------------------------------Endo--------------------------------  -HgbA1c 4    ----------------------------------------Prophylaxis----------------------------  DVT: hold Lovenox for low platelets'  GI: PPI    ---------------------------------------GOC---------------------------  DNR

## 2020-10-27 NOTE — PROGRESS NOTE ADULT - ASSESSMENT
64 yo Female with Hx of recurrent SBO`s s/p exp lap, small bowel resection in 2015, ex lap, ALBINA in 2018, DVT, PE on Xarelto, IVC filter and s/p supra-hepatic IVC thrombosis/occlusion, anemia, anasarca was sent to ED by PCP for hypotension, generalized weakness and chills and was admitted for hypotension, r/p sepsis on 10/13/2020, found to have Bacteroides fragilis bacteremia, suspected GI source, was treated with cefepime + metronidazole, remained hypothermic, hypotensive, was transferred to ICU and switched to meropenem on 10/21. Repeat CT 10/21 suggested  non-specific enterocolitis, noninfectious inflammatory bowel disease or ischemic bowel along with ileus, and concern for SBO given mild luminal narrowing at distal small bowel. Patient remained hypotensive, hypothermic, requiring pressor support, septic and got intubated on 10/24 and concern for HCAP vs. aspiration.  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 Liver enzymes were up-trending, along with bilirubin, possibly due to ongoing sepsis.       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  (prior thrombosis workup?)  - No caudate lobe hypertrophy reported (present in 75% of cases), no macroregenerative nodules reported, characteristic pattern of parenchymal perfusion not described,, but study reported limited due to motion artifact   - Venography would be gold standard for diagnosis, d/w IR, if patient will be more stable (possibly outpatient), can be evaluated at [facility] for Dx and potential intervention, this time patient is septic on pressor support  - was already on full dose anticoagulation, was being held b/o coagulopathy     #Abnormal liver tests   - most likely due to ongoing sepsis (only mildly elevated or normal on admission and started to worsen when patient condition deteriorated), but can check hepatitis panel  -  mgmt. of sepsis per ICU  - bilirubin was mildly elevated but had severe coagulopathy, can check Factor VIII       # Ascites   - Dx paracentesis could be useful given ongoing sepsis (last 2 sets of BCx nl) but as d/w IR, no safe window based on last CT   (last was in 2/2019, when SAAG < 1.1 serum alb 1.0, fluid alb < 0.2, total prot < 1)     # Encephalopathy (even elevated ammonia can be multifactorial)  - c/w lactulose and c/w rifaximin   - consider CT head (on full dose AC)    # Anemia, thrombocytopenia   - possibly malnutrition plus chronic GI loss and sepsis  - hematology F/u appreciated  - FOBT pos, GI f/u    Will continue to follow  Thank you for the consult

## 2020-10-27 NOTE — PROGRESS NOTE ADULT - PROBLEM SELECTOR PLAN 2
2/2 shock/bacteremia; comorbid MOF. Patient remains sedated/intubated; on pressor, IV abx for bacteroides bacteremia, likely gi source. Guarded prognosis. Daughter/Jus is surrogate; DNR on file.

## 2020-10-27 NOTE — PROGRESS NOTE ADULT - SUBJECTIVE AND OBJECTIVE BOX
still intubated  on vent and pressor    MEDICATIONS  (STANDING):  chlorhexidine 0.12% Liquid 15 milliLiter(s) Oral Mucosa every 12 hours  chlorhexidine 2% Cloths 1 Application(s) Topical daily  fentaNYL   Infusion. 1 MICROgram(s)/kG/Hr (5.62 mL/Hr) IV Continuous <Continuous>  lactulose Syrup 30 Gram(s) Oral every 6 hours  meropenem  IVPB 1000 milliGRAM(s) IV Intermittent every 8 hours  midodrine. 10 milliGRAM(s) Oral three times a day  norepinephrine Infusion 0.05 MICROgram(s)/kG/Min (2.63 mL/Hr) IV Continuous <Continuous>  nystatin Powder 1 Application(s) Topical two times a day  pantoprazole  Injectable 40 milliGRAM(s) IV Push two times a day  polyethylene glycol 3350 17 Gram(s) Oral daily  rifAXIMin 550 milliGRAM(s) Oral two times a day  vancomycin  IVPB 1000 milliGRAM(s) IV Intermittent every 12 hours    MEDICATIONS  (PRN):  sodium chloride 0.9% lock flush 10 milliLiter(s) IV Push every 1 hour PRN Pre/post blood products, medications, blood draw, and to maintain line patency      Allergies    No Known Allergies    Intolerances        Vital Signs Last 24 Hrs  T(C): 35.3 (27 Oct 2020 07:15), Max: 37.2 (26 Oct 2020 10:30)  T(F): 95.6 (27 Oct 2020 07:15), Max: 99 (26 Oct 2020 11:45)  HR: 98 (27 Oct 2020 09:30) (90 - 120)  BP: 103/80 (27 Oct 2020 09:30) (80/62 - 112/64)  BP(mean): 86 (27 Oct 2020 09:30) (67 - 787)  RR: 24 (27 Oct 2020 09:30) (14 - 57)  SpO2: 100% (27 Oct 2020 09:30) (98% - 100%)    PHYSICAL EXAM  General: adult in NAD  HEENT: clear oropharynx, anicteric sclera, pink conjunctiva  Neck: supple  CV: normal S1/S2 with no murmur rubs or gallops  Lungs: positive air movement b/l ant lungs,clear to auscultation, no wheezes, no rales  Abdomen: soft non-tender non-distended, no hepatosplenomegaly  Ext: no clubbing cyanosis or edema  Skin: no rashes and no petechiae  Neuro: alert and oriented X 4, no focal deficits  LABS:                          10.1   6.80  )-----------( 43       ( 27 Oct 2020 06:44 )             29.9         Mean Cell Volume : 93.1 fl  Mean Cell Hemoglobin : 31.5 pg  Mean Cell Hemoglobin Concentration : 33.8 gm/dL  Auto Neutrophil # : x  Auto Lymphocyte # : x  Auto Monocyte # : x  Auto Eosinophil # : x  Auto Basophil # : x  Auto Neutrophil % : x  Auto Lymphocyte % : x  Auto Monocyte % : x  Auto Eosinophil % : x  Auto Basophil % : x    Serial CBC  Hematocrit 29.9  Hemoglobin 10.1  Plat 43  RBC 3.21  WBC 6.80  Serial CBC  Hematocrit 29.6  Hemoglobin 10.1  Plat 45  RBC 3.16  WBC 5.96  Serial CBC  Hematocrit 28.6  Hemoglobin 9.6  Plat 48  RBC 3.06  WBC 3.70  Serial CBC  Hematocrit 28.7  Hemoglobin 9.6  Plat 52  RBC 3.07  WBC 3.38  Serial CBC  Hematocrit 27.5  Hemoglobin 9.2  Plat 58  RBC 2.93  WBC 3.33  Serial CBC  Hematocrit 26.7  Hemoglobin 8.8  Plat 65  RBC 2.81  WBC 3.35  Serial CBC  Hematocrit 26.4  Hemoglobin 8.5  Plat 73  RBC 2.77  WBC 3.33  Serial CBC  Hematocrit 22.3  Hemoglobin 7.0  Plat 68  RBC 2.26  WBC 2.49  Serial CBC  Hematocrit 24.3  Hemoglobin 7.7  Plat 83  RBC 2.46  WBC 4.08  Serial CBC  Hematocrit 26.2  Hemoglobin 8.6  Plat 98  RBC 2.79  WBC 5.63    10-27    148<H>  |  119<H>  |  9   ----------------------------<  76  4.0   |  24  |  0.89    Ca    8.1<L>      27 Oct 2020 06:44  Phos  2.4     10-27  Mg     2.1     10-27    TPro  4.4<L>  /  Alb  2.0<L>  /  TBili  4.2<H>  /  DBili  x   /  AST  85<H>  /  ALT  75<H>  /  AlkPhos  116  10-27      PT/INR - ( 26 Oct 2020 06:39 )   PT: 19.0 sec;   INR: 1.63 ratio         PTT - ( 26 Oct 2020 06:39 )  PTT:47.6 sec    Ferritin, Serum: 810 ng/mL (10-22 @ 22:11)  Iron - Total Binding Capacity.: 63 ug/dL (10-22 @ 13:40)            BLOOD SMEAR INTERPRETATION:       RADIOLOGY & ADDITIONAL STUDIES:

## 2020-10-27 NOTE — CHART NOTE - NSCHARTNOTEFT_GEN_A_CORE
PC  contacted patient's daughter Jus Bucio [phone] to introduce herself and provide emotional support.  Ms. Bucio stated she will be at the hospital this afternoon.  Both parties agreed to meet and speak in person.    provided her contact information and will remain available for emotional support and collaboration with the primary team.

## 2020-10-27 NOTE — PROGRESS NOTE ADULT - SUBJECTIVE AND OBJECTIVE BOX
Patient is seen and examined at the bed side, is Normothermic. She remains intubated and on pressor. The sputum culture grew  Methicillin resistant Staphylococcus aureus  and Stenotrophomonas maltophilia.       REVIEW OF SYSTEMS: Unable to obtain due to mental status         ALLERGIES: No Known Allergies      ICU Vital Signs Last 24 Hrs  T(C): 35.2 (27 Oct 2020 11:00), Max: 37 (26 Oct 2020 18:15)  T(F): 95.3 (27 Oct 2020 11:00), Max: 98.6 (26 Oct 2020 18:15)  HR: 94 (27 Oct 2020 16:26) (88 - 110)  BP: 106/84 (27 Oct 2020 15:45) (97/72 - 116/89)  BP(mean): 89 (27 Oct 2020 15:45) (73 - 95)  ABP: --  ABP(mean): --  RR: 24 (27 Oct 2020 15:45) (18 - 57)  SpO2: 100% (27 Oct 2020 16:26) (98% - 100%)        PHYSICAL EXAM:  GENERAL: Intubated /vented, on CARLOTTA HUGGER  CHEST/LUNG: Not using accessory muscles   HEART: s1 and s2 present  ABDOMEN:  Nontender and  Nondistended  EXTREMITIES: No pedal  edema  CNS: Intubated/vented          LABS:                          10.1   6.80  )-----------( 43       ( 27 Oct 2020 06:44 )             29.9                         9.6    3.70  )-----------( 48       ( 26 Oct 2020 06:39 )             28.6         10-27    148<H>  |  119<H>  |  9   ----------------------------<  76  4.0   |  24  |  0.89    Ca    8.1<L>      27 Oct 2020 06:44  Phos  2.4     10-27  Mg     2.1     10-27    TPro  4.4<L>  /  Alb  2.0<L>  /  TBili  4.2<H>  /  DBili  x   /  AST  85<H>  /  ALT  75<H>  /  AlkPhos  116  10-27    10-26    150<H>  |  124<H>  |  10  ----------------------------<  89  4.1   |  24  |  1.05    Ca    8.1<L>      26 Oct 2020 15:02  Phos  2.5     10-26  Mg     2.1     10-26    TPro  4.3<L>  /  Alb  2.1<L>  /  TBili  3.1<H>  /  DBili  x   /  AST  104<H>  /  ALT  85<H>  /  AlkPhos  115  10-26    10-25    152<H>  |  123<H>  |  11  ----------------------------<  88  3.4<L>   |  23  |  1.21    Ca    8.5      25 Oct 2020 12:24  Phos  1.9     10-25  Mg     2.2     10-25    TPro  4.9<L>  /  Alb  2.6<L>  /  TBili  2.6<H>  /  DBili  x   /  AST  133<H>  /  ALT  94<H>  /  AlkPhos  116  10-25    TPro  4.2<L>  /  Alb  2.4<L>  /  TBili  2.4<H>  /  DBili  x   /  AST  208<H>  /  ALT  87<H>  /  AlkPhos  105  10-22        Procalcitonin, Serum (10.15.20 @ 11:48)   Procalcitonin, Serum: 0.16: Procalcitonin (PCT) Interpretation (ng/mL) - Diagnosis of systemic   bacterial infection/sepsis         MEDICATIONS  (STANDING):    albumin human 25% IVPB 50 milliLiter(s) IV Intermittent every 6 hours  chlorhexidine 0.12% Liquid 15 milliLiter(s) Oral Mucosa every 12 hours  chlorhexidine 2% Cloths 1 Application(s) Topical daily  fentaNYL   Infusion. 1 MICROgram(s)/kG/Hr (5.62 mL/Hr) IV Continuous <Continuous>  lactulose Syrup 30 Gram(s) Oral every 6 hours  midodrine. 10 milliGRAM(s) Oral three times a day  norepinephrine Infusion 0.05 MICROgram(s)/kG/Min (2.63 mL/Hr) IV Continuous <Continuous>  nystatin Powder 1 Application(s) Topical two times a day  pantoprazole  Injectable 40 milliGRAM(s) IV Push two times a day  polyethylene glycol 3350 17 Gram(s) Oral daily  rifAXIMin 550 milliGRAM(s) Oral two times a day  vancomycin  IVPB 1000 milliGRAM(s) IV Intermittent every 12 hours        RADIOLOGY & ADDITIONAL TESTS:    10/25/20: Xray Chest 1 View- PORTABLE-Routine (Xray Chest 1 View- PORTABLE-Routine in AM.) (10.25.20 @ 09:21) Frontal expiratory view of the chest shows the heart to be similar in size. Endotracheal tube, right jugular line and feeding tube remain present.    The lungs show similar left upper lobe infiltrate with progression of right perihilar infiltrate. Pleural effusions are similar. There is no evidence of pneumothorax.      10/23/20 : Xray Chest 1 View-PORTABLE IMMEDIATE (Xray Chest 1 View-PORTABLE IMMEDIATE .) (10.23.20 @ 19:09) Feeding tube tip near GE junction as noted. Questionable right upper lobe infiltrate. Follow-up study is recommended as clinically warranted.      10/21/20 : CT Abdomen and Pelvis w/ Oral Cont and w/ IV Cont (10.21.20 @ 15:59) Mural thickening of the left colon and rectum. Liquid stool in the colon. Apparent segmental mural thickening of the mid to distal small bowel and the proximal duodenum. Findings may represent nonspecific enterocolitis, noninfectious inflammatory bowel disease, or ischemic bowel. Clinical correlation is recommended. The celiac axis artery, SMA, and KINA are patent without stenosis.    Dilatation of the mid small bowel is again noted. Oral contrast has reached the terminal ileum. Findings may represent small bowel obstruction, or ileus related to nonspecific enterocolitis.   Cholelithiasis.    Moderate to large ascites in the abdomen, increased since the previous examination. 5 mm nonspecific noncalcified left upper lobe lung nodule; if the patient's is in the high risk category (i.e. smoker), follow-up chest CT may be pursued in 12 months to ensure stability.    Combination of atelectasis and consolidation in the left lower lobe.    Possible 1.0 cm hypodense lesion in the left lobe of the thyroid. Thyroid ultrasound may be pursued for further evaluation.   Mild bilateral pleural effusions.    Aging determinate compression fracture at T5 vertebra.        10/13/20 : CT Abdomen and Pelvis w/ IV Cont (10.13.20 @ 18:50) Diffuse dilatation of small bowel loops without a clear transition is slightly increased, likely an ileus.  Decreased moderate ascites.    1.5 cm pancreatic versus peripancreatic soft tissue nodule    10/13/20 : Xray Chest 1 View- PORTABLE-Urgent (Xray Chest 1 View- PORTABLE-Urgent .) (10.13.20 @ 16:34) Clear lungs.          MICROBIOLOGY DATA:    Culture - Sputum . (10.26.20 @ 00:26)   - Ampicillin/Sulbactam: R <=8/4   - Cefazolin: R >16   - Ceftazidime: I 16   - Clindamycin: R >4   - Erythromycin: R >4   - Gentamicin: S <=1 Should not be used as monotherapy   - Levofloxacin: S <=0.5   - Linezolid: S 2   - Oxacillin: R >2   - Penicillin: R >8   - RIF- Rifampin: S <=1 Should not be used as monotherapy   - Tetra/Doxy: S <=1   - Trimethoprim/Sulfamethoxazole: S <=0.5/9.5   - Trimethoprim/Sulfamethoxazole: S <=0.5/9.5   - Vancomycin: S 1   Gram Stain:  Moderate polymorphonuclear leukocytes per low power field   Rare Squamous epithelial cells per low power field   Moderate Gram Positive Cocci in Clusters per oil power field   Moderate Yeast per oil power field   Specimen Source: .Sputum Sputum   Culture Results:  Moderate Methicillin resistant Staphylococcus aureus   Moderate Stenotrophomonas maltophilia   Normal Respiratory Erin present   Organism Identification: Methicillin resistant Staphylococcus aureus   Stenotrophomonas maltophilia   Organism: Methicillin resistant Staphylococcus aureus   Organism: Stenotrophomonas maltophilia       MRSA/MSSA PCR (10.21.20 @ 09:41)   MRSA PCR Result.: Detected:       Culture - Blood (10.20.20 @ 22:09)   Specimen Source: .Blood Blood   Culture Results:  No growth to date.     Culture - Blood (10.20.20 @ 22:09)   Specimen Source: .Blood Blood   Culture Results:   No growth to date.     Culture - Blood in AM (10.16.20 @ 10:13)   Specimen Source: .Blood Blood-Peripheral   Culture Results:   No growth to date.     Culture - Blood in AM (10.16.20 @ 10:13)   Specimen Source: .Blood Blood-Peripheral   Culture Results:   No growth to date.     Culture - Blood (10.13.20 @ 22:23)   Growth in anaerobic bottle: Gram Negative Rods   Specimen Source: .Blood Blood-Peripheral   Organism: Blood Culture PCR   Culture Results:   Growth in anaerobic bottle: Bacteroides fragilis   "Susceptibilities not performed"   ***Blood Panel PCR results on this specimen are available   approximately 3 hours after the Gram stain result.***   Gram stain, PCR, and/or culture results may not always   correspond due to difference in methodologies.     Culture - Blood (10.13.20 @ 22:23)   Specimen Source: .Blood Blood-Peripheral   Culture Results:   No growth to date.     Urine Microscopic-Add On (NC) (10.13.20 @ 21:39)   Bacteria: Moderate /HPF   Epithelial Cells: Moderate /HPF   Red Blood Cell - Urine: 5-10 /HPF   White Blood Cell - Urine: 6-10 /HPF

## 2020-10-27 NOTE — PROGRESS NOTE ADULT - ASSESSMENT
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  BC grew bacteroides.    Problem/Recommendation - 1:  Problem: Bacteremia. Recommendation: bacteroides  most likely GI origin  on antibiotics  he has hypothermia and hypotension and elevated lactic acid  in ICU now  on  pressor  check cortisol level to r/o adrenal insuff  Problem/Recommendation - 2:  ·  Problem: Anemia.  Recommendation: ferritin, b12 folate normal  no hemolysis  most likley due to malnutrition from short bowel syndrome.   Problem/Recommendation - 3:  ·  Problem: Acute deep vein thrombosis (DVT) of other vein of upper extremity.  Recommendation: she has been on xarelto for 2 years.  DVT at left arm and left leg before.  in all CT scan i did not see report of IVC or hepatic vein thrombosis  off xarelto and lovenox due to elevated PT/PTT  4. lethargy  ?due to sepsis,  5. elevated PT/PTT due to malnutrition and antibiotics causing VITK def  now it is mostly recovered  she develops DVT very quickly while off AC  needs at least prophylactic dose of AC  5. thrombocytopenia  develops in hospital  sepsis, DIC, or meds?  check d-dimer and procal

## 2020-10-27 NOTE — CHART NOTE - NSCHARTNOTEFT_GEN_A_CORE
PC  went to the bedside to meet with the patient's daughter Ms. Bucio.  Ms. Bucio was Face Timing family members and asked this  to return at a later time.  PC Evelia returned again and the patient's daughter was still engaged in FaceTime.  Evelia provided her business card and encouraged Ms. Bucio to contact her should any needs arise.  PC  will remain available as needed.

## 2020-10-27 NOTE — PROGRESS NOTE ADULT - PROBLEM SELECTOR PLAN 1
2/2 shock/bacteremia; involving lungs (patient intubated 20/24); heart (on pressor); liver (TB 4.2, AST 85, ALT 75); kidneys (poor urine output). Patient remains sedated/intubated; on pressor. On antibiotics for bacteroides bacteremia, likely gi source. 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 Guarded prognosis. Daughter/Jus is surrogate; DNR on file.

## 2020-10-27 NOTE — PROGRESS NOTE ADULT - ASSESSMENT
Patient is a 65y old  Female from home, lives with daughter, ambulates with walker with PMH of recurrent SBO's, s/p exp lap, SB resection in 2015, ex lap, ALBINA in 2018, DVT, PE, on Xarelto, IVC filter, chronic leg swelling, and anasarca, Now send in to the ER by her PCP, Dr. Ross, for evaluation of  generalized weakness and hypotension BP of 80/40 during office visit. On admission, she found to have no fever and BP was in lower normal range and no Leukocytosis. The CXR is clear and CT abd/pelvis consistent with Ileus. The ID consult requested to assist with evaluation of infectious etiology of episode of hypotension. Found to have Bacteroides bacteremia and now developed septic shock on Cefepime and Flagyl. Hence transferred to ICU. 10/20/20.    # Hypotension  at office- BP of 80/40 - resolved   #  Bacteroides fragilis Bacteremia - 10/13/20 - ? source most likely GI- Repeat Blood Cxs have NGTD 10/16/20  # Ileus  - h/o recurrent SBO and s/p EXp. LAp  # COVID 19 negative   # Septic shock ( Hypothermia + hypotensive)- transferred to ICU since requiring pressor- source GI, The CT abd/pelvis shows nonspecific enterocolitis, noninfectious inflammatory bowel disease, or ischemic bowel, along with ileus.   # Pneumonia- HCAP vs Aspiration - on CXR 10/24 and CT chest 10/21- sputum Cx grew Methicillin resistant Staphylococcus aureus  and Stenotrophomonas maltophilia.      would recommend:    1. Please add Levaquin to cover Stenotrophomonas  2. Continue IV Vancomycin to cover MRSA  and  discontinue Meropenem   3. Aspiration precaution  4. Monitor kidney function and adjust Abx doses accordingly   5. Management of Vent as per ICU protocol    d/w ICU team       Attending Attestation:    Spent more than 45 minutes on total encounter, more than 50 % of the visit was spent counseling and/or coordinating care by the Attending physician. Patient is a 65y old  Female from home, lives with daughter, ambulates with walker with PMH of recurrent SBO's, s/p exp lap, SB resection in 2015, ex lap, ALBINA in 2018, DVT, PE, on Xarelto, IVC filter, chronic leg swelling, and anasarca, Now send in to the ER by her PCP, Dr. Ross, for evaluation of  generalized weakness and hypotension BP of 80/40 during office visit. On admission, she found to have no fever and BP was in lower normal range and no Leukocytosis. The CXR is clear and CT abd/pelvis consistent with Ileus. The ID consult requested to assist with evaluation of infectious etiology of episode of hypotension. Found to have Bacteroides bacteremia and now developed septic shock on Cefepime and Flagyl. Hence transferred to ICU. 10/20/20.    # Hypotension  at office- BP of 80/40 - resolved   #  Bacteroides fragilis Bacteremia - 10/13/20 - ? source most likely GI- Repeat Blood Cxs have NGTD 10/16/20  # Ileus  - h/o recurrent SBO and s/p EXp. LAp  # COVID 19 negative   # Septic shock ( Hypothermia + hypotensive)- transferred to ICU since requiring pressor- source GI, The CT abd/pelvis shows nonspecific enterocolitis, noninfectious inflammatory bowel disease, or ischemic bowel, along with ileus.   # Pneumonia- HCAP vs Aspiration - on CXR 10/24 and CT chest 10/21- sputum Cx grew Methicillin resistant Staphylococcus aureus  and Stenotrophomonas maltophilia.      would recommend:    1. Please add Levaquin to cover Stenotrophomonas  2. Continue IV Vancomycin to cover MRSA  and  discontinue Meropenem   3. Aspiration precaution  4. Monitor kidney function and adjust Abx doses accordingly   5. Management of Vent as per ICU protocol  6. Monitor and keep Vancomycin level between 15 to 20    d/w ICU team       Attending Attestation:    Spent more than 45 minutes on total encounter, more than 50 % of the visit was spent counseling and/or coordinating care by the Attending physician.

## 2020-10-27 NOTE — PROGRESS NOTE ADULT - PROBLEM SELECTOR PLAN 4
2/2 recurrent hospitalizations, s/p JOHNATHAN. Was ambulatory short distances at home. Now patient sedated/intubated, on pressor; dependent on ADLs, + skin failure.  Guarded prognosis.

## 2020-10-27 NOTE — PROGRESS NOTE ADULT - SUBJECTIVE AND OBJECTIVE BOX
Patient was seen and examined  Patient is a 65y old  Female who presents with a chief complaint of Hypotension (27 Oct 2020 09:56)      INTERVAL HPI/OVERNIGHT EVENTS:  T(C): 35.2 (10-27-20 @ 11:00), Max: 37.2 (10-26-20 @ 13:30)  HR: 92 (10-27-20 @ 12:15) (88 - 116)  BP: 101/77 (10-27-20 @ 12:15) (88/69 - 112/64)  RR: 21 (10-27-20 @ 12:15) (14 - 57)  SpO2: 100% (10-27-20 @ 12:15) (98% - 100%)  Wt(kg): --  I&O's Summary    26 Oct 2020 07:01  -  27 Oct 2020 07:00  --------------------------------------------------------  IN: 1848.4 mL / OUT: 420 mL / NET: 1428.4 mL    27 Oct 2020 07:01  -  27 Oct 2020 12:41  --------------------------------------------------------  IN: 457 mL / OUT: 75 mL / NET: 382 mL        LABS:                        10.1   6.80  )-----------( 43       ( 27 Oct 2020 06:44 )             29.9     10-27    148<H>  |  119<H>  |  9   ----------------------------<  76  4.0   |  24  |  0.89    Ca    8.1<L>      27 Oct 2020 06:44  Phos  2.4     10-27  Mg     2.1     10-27    TPro  4.4<L>  /  Alb  2.0<L>  /  TBili  4.2<H>  /  DBili  x   /  AST  85<H>  /  ALT  75<H>  /  AlkPhos  116  10-27    PT/INR - ( 26 Oct 2020 06:39 )   PT: 19.0 sec;   INR: 1.63 ratio         PTT - ( 26 Oct 2020 06:39 )  PTT:47.6 sec    CAPILLARY BLOOD GLUCOSE      POCT Blood Glucose.: 123 mg/dL (27 Oct 2020 10:57)  POCT Blood Glucose.: 86 mg/dL (27 Oct 2020 05:27)  POCT Blood Glucose.: 95 mg/dL (26 Oct 2020 23:21)  POCT Blood Glucose.: 89 mg/dL (26 Oct 2020 17:29)    LIPID PANEL  Cholesterol --  LDL --  HDL --  RATIO HDL/Total Cholesterol --  Triglyceride 58  LIPID PANEL  Cholesterol --  LDL --  HDL --  RATIO HDL/Total Cholesterol --  Triglyceride 56  LIPID PANEL  Cholesterol --  LDL --  HDL --  RATIO HDL/Total Cholesterol --  Triglyceride 86      ABG - ( 27 Oct 2020 03:36 )  pH, Arterial: 7.40  pH, Blood: x     /  pCO2: 34    /  pO2: 200   / HCO3: 21    / Base Excess: -3.2  /  SaO2: 98                    MEDICATIONS  (STANDING):  albumin human 25% IVPB 50 milliLiter(s) IV Intermittent every 6 hours  chlorhexidine 0.12% Liquid 15 milliLiter(s) Oral Mucosa every 12 hours  chlorhexidine 2% Cloths 1 Application(s) Topical daily  fentaNYL   Infusion. 1 MICROgram(s)/kG/Hr (5.62 mL/Hr) IV Continuous <Continuous>  lactulose Syrup 30 Gram(s) Oral every 6 hours  meropenem  IVPB 1000 milliGRAM(s) IV Intermittent every 8 hours  midodrine. 10 milliGRAM(s) Oral three times a day  norepinephrine Infusion 0.05 MICROgram(s)/kG/Min (2.63 mL/Hr) IV Continuous <Continuous>  nystatin Powder 1 Application(s) Topical two times a day  pantoprazole  Injectable 40 milliGRAM(s) IV Push two times a day  polyethylene glycol 3350 17 Gram(s) Oral daily  rifAXIMin 550 milliGRAM(s) Oral two times a day  vancomycin  IVPB 1000 milliGRAM(s) IV Intermittent every 12 hours    MEDICATIONS  (PRN):  sodium chloride 0.9% lock flush 10 milliLiter(s) IV Push every 1 hour PRN Pre/post blood products, medications, blood draw, and to maintain line patency      RADIOLOGY & ADDITIONAL TESTS:    Imaging Personally Reviewed:  [ ] YES  [ ] NO    REVIEW OF SYSTEMS:  on vent     Consultant(s) Notes Reviewed:  [ ] YES  [ ] NO    PHYSICAL EXAM:  GENERAL: NAD, well-groomed, well-developed  HEAD:  Atraumatic, Normocephalic  EYES: EOMI, PERRLA, conjunctiva and sclera clear  ENMT: No tonsillar erythema, exudates, or enlargement; Moist mucous membranes, Good dentition, No lesions  NECK: Supple, No JVD, Normal thyroid  NERVOUS SYSTEM: on vent  CHEST/LUNG: Clear to percussion bilaterally; No rales, rhonchi, wheezing, or rubs  HEART: Regular rate and rhythm; No murmurs, rubs, or gallops  ABDOMEN: Soft, Nontender, Nondistended; Bowel sounds present  EXTREMITIES:  2+ Peripheral Pulses, No clubbing, cyanosis, or edema  LYMPH: No lymphadenopathy noted  SKIN: No rashes or lesions    Care Discussed with Consultants/Other Providers [ x] YES  [ ] NO

## 2020-10-27 NOTE — PROGRESS NOTE ADULT - ASSESSMENT
AS PER ICU  65y Female from home, lives with daughter, ambulates with walker with PMH of recurrent SBO's, s/p exp lap, SB resection in 2015, ex lap, ALBINA in 2018, DVT, PE, on Xarelto, IVC filter, chronic leg swelling, and anasarca, came in to the ED due to hypotension during office visit. Patient was found to be bacteremic with bacteroids fragilis. Admitted in ICU due to hypotension and hypothermia. On pressors meropenem , midodrine and Solu-cortef.  BCx X2 negative.     Diagnosis:  >Encephalopathy  >Sepsis  >Bacteremia  >Anemia  >DVT/PE  >Transaminitis  >Aspiration pneumonia vs HCAP    --------------------------------------------Neuro--------------------------------------  -She is AAOx2 at baseline on evaluation.  -Encephalopathic and got intubated on 10/24  -Ammonia trending down from 152 > 130 > 116 >103. trend up today 131  -On lactulose 30g q 6h and rifaximin   -Carl maganaer for hypothermia, Temp 95.6  -INR> 1.63      -------------------------------------CVS-------------------------------  -Patient is hypotensive and hypothermic secondary to sepsis.  -c/w with meropenem and vancomycin   - NG tube feeding   -Midodrine 10 mg TID  -RIJ for pressors on levophed  -Hold Lasix home med in the setting of hypotension  -Pt is in fluid overload with decrease urine output, will give Lasix for fluid overload and albumin to maintain the BP  -f/u ECHO today, previous ECHO 3-4 months back showed normal EF      --------------------------------Respiratory----------------------  -Intubated and sedated  -CT showed mild b/l pleural effusion and left lower lobe consolidation  -HCAP vs aspiration pneumonia  - sputum culture grew +ve cocci clusters   -c/w meropenem and vancomycin   -f/u vanc trough today at 4pm  -ID Dr. Patricio    ----------------------------------------Gastrointestinal--------------------------------------  -Patient was bacteremic with bacteroids fragilis, likely secondary from intra abdominal source.  -Patient had multiple SBO in past. Ct abdomen on admission showed ileus, Repeat CT A/P showed ileus and non occlusive ischemic colitis  -No signs of acute abdomen   -Surgery recs > No intervention, pt was admitted in Jan diagnosed with budd Chiari syndrome with portal HTN recs to transfer to [facility] but pt is not stable   -LFTs are mildly elevated since admission, trending down. Likely due to sepsis vs hepatic failure sec to budd Chiari  -C/w meropenem and vanc (as nasopharyngeal swab grew MRSA , sputum prelim showed positive cocci in clusters).   -BCx X 2 negative.  -ammonia trends down from 150 to 130 >116>103, trend up today, on lactulose and rifaximin   -had 2 BM this morning, monitor BM  -GI, DR Chatterjee deferred doing colonoscopy due to multiple comorbidities, FOBT positive on 10/15  -Dr Potts recs has been noted    -----------------------------------------ID---------------------------------------  -Patient was bacteremic with bacteroids likely Gi source.  -Lactate is normal 1.3  -c/w meropenem and vanc  - BP on the lower side, pt is on Levophed baby dose and midodrine 10mg TID  -Monitor vitals Q4      ---------------------------------------------Nephro-------------------------------------  -Kidney function is normal, Cl is 120 with  sodium trends up from 147>150.152>152>151>148  -TF  -free water 250q4 increased to 300q4  -Monitor respiratory status    ----------------------------------------Hem Onc----------------------------------  -Has h/o DVt and PE in past. Patient is on Xarelto  -Patient has anemia  -anemia of chronic disease  -Hgb 6.9 1PRBC >8.7 >7 1PRBC> 8.5>1 PRBC> 8.5>9.7>9.6>10   -Plat trends down [id number]  -Hold AC for now, will closely monitor the patient.   -Folate and B12 normal.   -Monitor CBC    ----------------------------------------Endo--------------------------------  -HgbA1c 4    ----------------------------------------Prophylaxis----------------------------  DVT: hold Lovenox for low platelets'  GI: PPI    ---------------------------------------GOC---------------------------  DNR

## 2020-10-27 NOTE — PROGRESS NOTE ADULT - SUBJECTIVE AND OBJECTIVE BOX
OVERNIGHT EVENTS: patient intubated 10/24; sedated/intubated     Present Symptoms:   Review of Systems: Unable to obtain due to poor mentation    MEDICATIONS  (STANDING):  albumin human 25% IVPB 50 milliLiter(s) IV Intermittent every 6 hours  chlorhexidine 0.12% Liquid 15 milliLiter(s) Oral Mucosa every 12 hours  chlorhexidine 2% Cloths 1 Application(s) Topical daily  fentaNYL   Infusion. 1 MICROgram(s)/kG/Hr (5.62 mL/Hr) IV Continuous <Continuous>  lactulose Syrup 30 Gram(s) Oral every 6 hours  midodrine. 10 milliGRAM(s) Oral three times a day  norepinephrine Infusion 0.05 MICROgram(s)/kG/Min (2.63 mL/Hr) IV Continuous <Continuous>  nystatin Powder 1 Application(s) Topical two times a day  pantoprazole  Injectable 40 milliGRAM(s) IV Push two times a day  polyethylene glycol 3350 17 Gram(s) Oral daily  rifAXIMin 550 milliGRAM(s) Oral two times a day  vancomycin  IVPB 1000 milliGRAM(s) IV Intermittent every 12 hours    MEDICATIONS  (PRN):  sodium chloride 0.9% lock flush 10 milliLiter(s) IV Push every 1 hour PRN Pre/post blood products, medications, blood draw, and to maintain line patency      PHYSICAL EXAM:  Vital Signs Last 24 Hrs  T(C): 35.2 (27 Oct 2020 11:00), Max: 37.1 (26 Oct 2020 15:15)  T(F): 95.3 (27 Oct 2020 11:00), Max: 98.7 (26 Oct 2020 15:15)  HR: 95 (27 Oct 2020 13:45) (88 - 113)  BP: 110/87 (27 Oct 2020 13:45) (88/69 - 116/89)  BP(mean): 93 (27 Oct 2020 13:45) (70 - 95)  RR: 24 (27 Oct 2020 13:00) (14 - 57)  SpO2: 100% (27 Oct 2020 13:00) (98% - 100%)    General: patient sedated/intubated, minimally responsive to tactile stimuli, ill-appearing, on pressor. Unable to follow commands. No signs of distress.  Karnofsky Performance Score/Palliative Performance Status Version2:    20    HEENT:   ET tube   Lungs: mild tachypnea; on ventilator. Continues fentanyl  CV: S1S2, IRR, + tachycardia. On pressor  GI: abdomen soft, nontender, +incontinent, NGT  :    velazquez  Musculoskeletal: patient sedated/intubated, dependent on ADLs, unable to follow commands  Skin: +anasarca; cool BUE, Ruptured Bullae to the Bilateral Groin /Medial Thigh/Bilateral Elbows; BUE weeping edema; Bullae to the L. Ant Foot  Neuro: patient sedated/intubated, minimally responsive to tactile stimuli, unable to follow commands   Oral intake ability: unable/only mouth care   Diet: NPO, TF via NGT    LABS:                          10.1   6.80  )-----------( 43       ( 27 Oct 2020 06:44 )             29.9     10-27    148<H>  |  119<H>  |  9   ----------------------------<  76  4.0   |  24  |  0.89    Ca    8.1<L>      27 Oct 2020 06:44  Phos  2.4     10-27  Mg     2.1     10-27    TPro  4.4<L>  /  Alb  2.0<L>  /  TBili  4.2<H>  /  DBili  x   /  AST  85<H>  /  ALT  75<H>  /  AlkPhos  116  10-27        RADIOLOGY & ADDITIONAL STUDIES:  < from: Xray Chest 1 View- PORTABLE-Routine (Xray Chest 1 View- PORTABLE-Routine in AM.) (10.27.20 @ 07:49) >  EXAM:  XR CHEST PORTABLE ROUTINE 1V                            PROCEDURE DATE:  10/27/2020          INTERPRETATION:  Chest one view    HISTORY: Intubation    COMPARISON STUDY: 10/26/2020    Frontal expiratory view of the chest shows the heart to benormal in size. Endotracheal tube, feeding tube and right jugular line remain present although the feeding tube tip is not included.    The lungs show less pulmonary congestion with reduction of pleural effusions and there is no evidence of pneumothorax.    IMPRESSION:  Decreased congestion.    < end of copied text >      ADVANCE DIRECTIVES: MOLST: DNR

## 2020-10-28 NOTE — PROGRESS NOTE ADULT - ASSESSMENT
as per icu     65y Female from home, lives with daughter, ambulates with walker with PMH of recurrent SBO's, s/p exp lap, SB resection in 2015, ex lap, ALBINA in 2018, DVT, PE, on Xarelto, IVC filter, chronic leg swelling, and anasarca, came in to the ED due to hypotension during office visit. Patient was found to be bacteremic with bacteroids fragilis. Admitted in ICU due to hypotension and hypothermia. On pressors meropenem , midodrine and Solu-cortef.  BCx X2 negative.     Diagnosis:  >Encephalopathy  >Sepsis  >Bacteremia  >Anemia  >DVT/PE  >Transaminitis  >Aspiration pneumonia vs HCAP    --------------------------------------------Neuro--------------------------------------  -She is AAOx2 at baseline on evaluation.  -Encephalopathic and got intubated on 10/24  -Ammonia trending down from 152 > 130 > 116 >103. trend up today 131  -On lactulose 30g q 6h and rifaximin   -Carl maganaer for hypothermia, Temp 95.6  -INR> 1.63      -------------------------------------CVS-------------------------------  -Patient is hypotensive and hypothermic secondary to sepsis.  -c/w with meropenem and vancomycin   - NG tube feeding   -Midodrine 10 mg TID  -RIJ for pressors on levophed  -Hold Lasix home med in the setting of hypotension  -Pt is in fluid overload with decrease urine output, will give Lasix for fluid overload and albumin to maintain the BP  -f/u ECHO today, previous ECHO 3-4 months back showed normal EF      --------------------------------Respiratory----------------------  -Intubated and sedated  -CT showed mild b/l pleural effusion and left lower lobe consolidation  -HCAP vs aspiration pneumonia  - sputum culture grew +ve cocci clusters   -c/w meropenem and vancomycin   -f/u vanc trough today at 4pm  -ID Dr. Patricio    ----------------------------------------Gastrointestinal--------------------------------------  -Patient was bacteremic with bacteroids fragilis, likely secondary from intra abdominal source.  -Patient had multiple SBO in past. Ct abdomen on admission showed ileus, Repeat CT A/P showed ileus and non occlusive ischemic colitis  -No signs of acute abdomen   -Surgery recs > No intervention, pt was admitted in Jan diagnosed with budd Chiari syndrome with portal HTN recs to transfer to [facility] but pt is not stable   -LFTs are mildly elevated since admission, trending down. Likely due to sepsis vs hepatic failure sec to budd Chiari  -C/w meropenem and vanc (as nasopharyngeal swab grew MRSA , sputum prelim showed positive cocci in clusters).   -BCx X 2 negative.  -ammonia trends down from 150 to 130 >116>103, trend up today, on lactulose and rifaximin   -had 2 BM this morning, monitor BM  -GI, DR Chatterjee deferred doing colonoscopy due to multiple comorbidities, FOBT positive on 10/15  -Dr Potts recs has been noted    -----------------------------------------ID---------------------------------------  -Patient was bacteremic with bacteroids likely Gi source.  -Lactate is normal 1.3  -c/w meropenem and vanc  - BP on the lower side, pt is on Levophed baby dose and midodrine 10mg TID  -Monitor vitals Q4      ---------------------------------------------Nephro-------------------------------------  -Kidney function is normal, Cl is 120 with  sodium trends up from 147>150.152>152>151>148  -TF  -free water 250q4 increased to 300q4  -Monitor respiratory status    ----------------------------------------Hem Onc----------------------------------  -Has h/o DVt and PE in past. Patient is on Xarelto  -Patient has anemia  -anemia of chronic disease  -Hgb 6.9 1PRBC >8.7 >7 1PRBC> 8.5>1 PRBC> 8.5>9.7>9.6>10   -Plat trends down [id number]  -Hold AC for now, will closely monitor the patient.   -Folate and B12 normal.   -Monitor CBC    ----------------------------------------Endo--------------------------------  -HgbA1c 4    ----------------------------------------Prophylaxis----------------------------  DVT: hold Lovenox for low platelets'  GI: PPI    ---------------------------------------GOC---------------------------  DNR

## 2020-10-28 NOTE — PROGRESS NOTE ADULT - ASSESSMENT
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  BC grew bacteroides.    Problem/Recommendation - 1:  Problem: Bacteremia. Recommendation: bacteroides  most likely GI origin  on antibiotics  he has hypothermia and hypotension and elevated lactic acid  in ICU now  on  pressor  check cortisol level to r/o adrenal insuff  Problem/Recommendation - 2:  ·  Problem: Anemia.  Recommendation: ferritin, b12 folate normal  no hemolysis  most likley due to malnutrition from short bowel syndrome.she also had GIB multiple times before.   Problem/Recommendation - 3:  ·  Problem: Acute deep vein thrombosis (DVT) of other vein of upper extremity.  Recommendation: she has been on xarelto for 2 years.  DVT at left arm and left leg before.  in all CT scan i did not see report of IVC or hepatic vein thrombosis  off xarelto and lovenox due to elevated PT/PTT  4. elevated PT/PTT due to malnutrition and antibiotics causing VITK def  now it is mostly recovered  she develops DVT very quickly while off AC  need DVT prophylaxis, sq heparin  5. thrombocytopenia  new onset, most likely from sepsis or medication  procal is rising  the other possibilties causing multiorgan failure, such as autoimmune, such as vasculitis, HLH, catastrophic APS

## 2020-10-28 NOTE — PROGRESS NOTE ADULT - ASSESSMENT
65y Female from home, lives with daughter, ambulates with walker with PMH of recurrent SBO's, s/p exp lap, SB resection in 2015, ex lap, ALBINA in 2018, DVT, PE, on Xarelto, IVC filter, chronic leg swelling, and anasarca, came in to the ED due to hypotension during office visit. Patient was found to be bacteremic with bacteroids fragilis. Admitted in ICU due to hypotension and hypothermia. On pressors meropenem , midodrine and Solu-cortef.  BCx X2 negative.     Diagnosis:  >Encephalopathy  >Sepsis  >Bacteremia  >Anemia  >DVT/PE  >Transaminitis  >Aspiration pneumonia vs HCAP    --------------------------------------------Neuro--------------------------------------  -She is AAOx2 at baseline on evaluation.  -Encephalopathic and got intubated on 10/24  -Ammonia trending down from 152 > 130 > 116 >103. trend up today 131  -On lactulose 30g q 6h and rifaximin   -Carl maganaer for hypothermia, Temp 95.6  -INR> 1.63      -------------------------------------CVS-------------------------------  -Patient is hypotensive and hypothermic secondary to sepsis.  -c/w with meropenem and vancomycin   - NG tube feeding   -Midodrine 10 mg TID  -RIJ for pressors on levophed  -Hold Lasix home med in the setting of hypotension  -Pt is in fluid overload with decrease urine output, will give Lasix for fluid overload and albumin to maintain the BP  -f/u ECHO today, previous ECHO 3-4 months back showed normal EF      --------------------------------Respiratory----------------------  -Intubated and sedated  -CT showed mild b/l pleural effusion and left lower lobe consolidation  -HCAP vs aspiration pneumonia  - sputum culture grew +ve cocci clusters   -c/w meropenem and vancomycin   -f/u vanc trough today at 4pm  -ID Dr. Patricio    ----------------------------------------Gastrointestinal--------------------------------------  -Patient was bacteremic with bacteroids fragilis, likely secondary from intra abdominal source.  -Patient had multiple SBO in past. 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 Likely due to sepsis vs hepatic failure sec to budd Chiari  -C/w meropenem and vanc (as nasopharyngeal swab grew MRSA , sputum prelim showed positive cocci in clusters).   -BCx X 2 negative.  -ammonia trends down from 150 to 130 >116>103, trend up today, on lactulose and rifaximin   -had 2 BM this morning, monitor BM  -GI, DR Chatterjee deferred doing colonoscopy due to multiple comorbidities, FOBT positive on 10/15  -Dr Potts recs has been noted    -----------------------------------------ID---------------------------------------  -Patient was bacteremic with bacteroids likely Gi source.  -Lactate is normal 1.3  -c/w meropenem and vanc  - BP on the lower side, pt is on Levophed baby dose and midodrine 10mg TID  -Monitor vitals Q4      ---------------------------------------------Nephro-------------------------------------  -Kidney function is normal, Cl is 120 with  sodium trends up from 147>150.152>152>151>148  -TF  -free water 250q4 increased to 300q4  -Monitor respiratory status    ----------------------------------------Hem Onc----------------------------------  -Has h/o DVt and PE in past. Patient is on Xarelto  -Patient has anemia  -anemia of chronic disease  -Hgb 6.9 1PRBC >8.7 >7 1PRBC> 8.5>1 PRBC> 8.5>9.7>9.6>10   -Plat trends down [id number]  -Hold AC for now, will closely monitor the patient.   -Folate and B12 normal.   -Monitor CBC    ----------------------------------------Endo--------------------------------  -HgbA1c 4    ----------------------------------------Prophylaxis----------------------------  DVT: hold Lovenox for low platelets'  GI: PPI    ---------------------------------------GOC---------------------------  DNR        65y Female from home, lives with daughter, ambulates with walker with PMH of recurrent SBO's, s/p exp lap, SB resection in 2015, ex lap, ALBINA in 2018, DVT, PE, on Xarelto, IVC filter, chronic leg swelling, and anasarca, came in to the ED due to hypotension during office visit. Patient was found to be bacteremic with bacteroids fragilis. Admitted in ICU due to hypotension and hypothermia. On vancomycin and levo . BCx X2 negative. Sputum positive for MRSA and Stenotrophomonas     Diagnosis:  >Encephalopathy  >Sepsis  >Bacteremia  >Anemia  >DVT/PE  >Transaminitis  >Aspiration pneumonia vs HCAP    --------------------------------------------Neuro--------------------------------------  -She is AAOx2 at baseline on evaluation.  -Encephalopathic and got intubated on 10/24  -Ammonia trending down from 152 > 130 > 116 >103>131>126  -On lactulose 30g q 6h and rifaximin  -Carl maganaer for hypothermia, Temp 95.6  -INR> 1.63>1.71      -------------------------------------CVS-------------------------------  -Patient is hypotensive and hypothermic secondary to sepsis.  -On abx Levo and vancomycin   - NG tube feeding   -Midodrine 10 mg TID  -RIJ for pressors on levophed  -Hold Lasix home med in the setting of hypotension  -Pt is in fluid overload with decrease urine output today as well, will give Lasix for fluid overload and albumin to maintain the BP  -previous ECHO 3-4 months back showed normal EF    --------------------------------Respiratory----------------------  -Intubated and sedated  -CT showed mild b/l pleural effusion and left lower lobe consolidation  -HCAP vs aspiration pneumonia  - sputum culture grew MRSA and Stenotrophomonas (sensitive to levo)  -On levo and vancomycin , next vanc trough tomorrow in the am  -ID Dr. Patricio    ----------------------------------------Gastrointestinal--------------------------------------  -Patient was bacteremic with bacteroids fragilis, likely secondary from intra abdominal source.  -Patient had multiple SBO in past. Ct abdomen on admission showed ileus, Repeat CT A/P showed ileus and non occlusive ischemic colitis  -No signs of acute abdomen   -Surgery recs > No intervention, pt was admitted in Jan diagnosed with budd Chiari syndrome with portal HTN recs to transfer to [facility] but pt is not stable to transfer  -LFTs are mildly elevated since admission, trending down. Likely due to sepsis vs hepatic failure sec to budd Chiari  -levo and vanc abx  -BCx X 2 negative.  -ammonia trends down from 150 to 130 >116>103>131>126, trend up today, on lactulose and rifaximin   -had 1BM ytd , was given enema this am and pt had a big BM  -monitor BM  -GI, DR Chatterjee deferred doing colonoscopy due to multiple comorbidities, FOBT positive on 10/15  -Dr Potts recs has been noted    -----------------------------------------ID---------------------------------------  -Patient was bacteremic with bacteroids likely Gi source.  -Lactate is normal 1.3  Procalcitonin 0.79  -On levo and vanc  - BP on the lower side, pt is on Levophed baby dose and midodrine 10mg TID  -Monitor vitals Q4      ---------------------------------------------Nephro-------------------------------------  -Kidney function is normal, Cl is 120 with  sodium trends up from 147>150.152>152>151>148>147  -TF  -free water 300q4  -f/u BMP in afternoon  -Monitor respiratory status    ----------------------------------------Hem Onc----------------------------------  -Has h/o DVt and PE in past. Patient is on Xarelto at home  -Patient has anemia  -anemia of chronic disease  -Hgb 6.9 1PRBC >8.7 >7 1PRBC> 8.5>1 PRBC> 8.5>9.7>9.6>10 9.5  -Plat trends down [id number]  -Hold AC for now, will closely monitor the patient.   -Folate and B12 normal.   -f/u CBC and BMP in afternoon  -Monitor CBC    ----------------------------------------Endo--------------------------------  -HgbA1c 4    ----------------------------------------Prophylaxis----------------------------  DVT: hold Lovenox for low platelets'  GI: PPI    ---------------------------------------GOC---------------------------  DNR

## 2020-10-28 NOTE — PROGRESS NOTE ADULT - SUBJECTIVE AND OBJECTIVE BOX
still on vent  on pressor  no gross bleeding    MEDICATIONS  (STANDING):  albumin human 25% IVPB 50 milliLiter(s) IV Intermittent every 8 hours  chlorhexidine 0.12% Liquid 15 milliLiter(s) Oral Mucosa every 12 hours  chlorhexidine 2% Cloths 1 Application(s) Topical daily  fentaNYL   Infusion. 1 MICROgram(s)/kG/Hr (5.62 mL/Hr) IV Continuous <Continuous>  furosemide   Injectable 40 milliGRAM(s) IV Push every 8 hours  lactulose Syrup 30 Gram(s) Oral every 6 hours  levoFLOXacin IVPB 750 milliGRAM(s) IV Intermittent every 24 hours  levoFLOXacin IVPB      midodrine. 10 milliGRAM(s) Oral three times a day  norepinephrine Infusion 0.05 MICROgram(s)/kG/Min (2.63 mL/Hr) IV Continuous <Continuous>  nystatin Powder 1 Application(s) Topical two times a day  pantoprazole  Injectable 40 milliGRAM(s) IV Push two times a day  polyethylene glycol 3350 17 Gram(s) Oral daily  propofol Infusion 10 MICROgram(s)/kG/Min (3.37 mL/Hr) IV Continuous <Continuous>  rifAXIMin 550 milliGRAM(s) Oral two times a day  vancomycin  IVPB 1000 milliGRAM(s) IV Intermittent every 12 hours    MEDICATIONS  (PRN):  sodium chloride 0.9% lock flush 10 milliLiter(s) IV Push every 1 hour PRN Pre/post blood products, medications, blood draw, and to maintain line patency      Allergies    No Known Allergies    Intolerances        Vital Signs Last 24 Hrs  T(C): 37.3 (28 Oct 2020 09:30), Max: 37.3 (28 Oct 2020 08:15)  T(F): 99.1 (28 Oct 2020 09:30), Max: 99.1 (28 Oct 2020 08:15)  HR: 113 (28 Oct 2020 11:30) (85 - 122)  BP: 111/85 (28 Oct 2020 11:30) (78/50 - 119/84)  BP(mean): 90 (28 Oct 2020 11:30) (56 - 95)  RR: 24 (28 Oct 2020 11:30) (19 - 28)  SpO2: 100% (28 Oct 2020 11:30) (95% - 100%)    PHYSICAL EXAM  General: adult in NAD  HEENT: clear oropharynx, anicteric sclera, pink conjunctiva  Neck: supple  CV: normal S1/S2 with no murmur rubs or gallops  Lungs: positive air movement b/l ant lungs,clear to auscultation, no wheezes, no rales  Abdomen: soft non-tender non-distended, no hepatosplenomegaly  Ext: no clubbing cyanosis or edema  Skin: no rashes and no petechiae  Neuro: alert and oriented X 4, no focal deficits  LABS:                          9.5    7.07  )-----------( 32       ( 28 Oct 2020 06:31 )             27.6         Mean Cell Volume : 93.2 fl  Mean Cell Hemoglobin : 32.1 pg  Mean Cell Hemoglobin Concentration : 34.4 gm/dL  Auto Neutrophil # : x  Auto Lymphocyte # : x  Auto Monocyte # : x  Auto Eosinophil # : x  Auto Basophil # : x  Auto Neutrophil % : x  Auto Lymphocyte % : x  Auto Monocyte % : x  Auto Eosinophil % : x  Auto Basophil % : x    Serial CBC  Hematocrit 27.6  Hemoglobin 9.5  Plat 32  RBC 2.96  WBC 7.07  Serial CBC  Hematocrit 29.9  Hemoglobin 10.1  Plat 43  RBC 3.21  WBC 6.80  Serial CBC  Hematocrit 29.6  Hemoglobin 10.1  Plat 45  RBC 3.16  WBC 5.96  Serial CBC  Hematocrit 28.6  Hemoglobin 9.6  Plat 48  RBC 3.06  WBC 3.70  Serial CBC  Hematocrit 28.7  Hemoglobin 9.6  Plat 52  RBC 3.07  WBC 3.38  Serial CBC  Hematocrit 27.5  Hemoglobin 9.2  Plat 58  RBC 2.93  WBC 3.33  Serial CBC  Hematocrit 26.7  Hemoglobin 8.8  Plat 65  RBC 2.81  WBC 3.35  Serial CBC  Hematocrit 26.4  Hemoglobin 8.5  Plat 73  RBC 2.77  WBC 3.33  Serial CBC  Hematocrit 22.3  Hemoglobin 7.0  Plat 68  RBC 2.26  WBC 2.49    10-28    147<H>  |  118<H>  |  9   ----------------------------<  110<H>  3.6   |  22  |  1.01    Ca    7.7<L>      28 Oct 2020 06:31  Phos  2.0     10-28  Mg     1.9     10-28    TPro  4.2<L>  /  Alb  2.1<L>  /  TBili  4.4<H>  /  DBili  x   /  AST  89<H>  /  ALT  66<H>  /  AlkPhos  110  10-28      PT/INR - ( 28 Oct 2020 09:57 )   PT: 19.8 sec;   INR: 1.71 ratio             Ferritin, Serum: 810 ng/mL (10-22 @ 22:11)  Iron - Total Binding Capacity.: 63 ug/dL (10-22 @ 13:40)            BLOOD SMEAR INTERPRETATION:       RADIOLOGY & ADDITIONAL STUDIES:

## 2020-10-28 NOTE — PROGRESS NOTE ADULT - SUBJECTIVE AND OBJECTIVE BOX
Patient is seen and examined at the bed side, is Normothermic. She remains intubated and on pressor.       REVIEW OF SYSTEMS: Unable to obtain due to mental status         ALLERGIES: No Known Allergies      ICU Vital Signs Last 24 Hrs  T(C): 36.4 (28 Oct 2020 16:45), Max: 37.3 (28 Oct 2020 08:15)  T(F): 97.5 (28 Oct 2020 16:45), Max: 99.1 (28 Oct 2020 08:15)  HR: 110 (28 Oct 2020 18:00) (85 - 122)  BP: 104/77 (28 Oct 2020 18:00) (78/50 - 119/84)  BP(mean): 84 (28 Oct 2020 18:00) (56 - 94)  ABP: --  ABP(mean): --  RR: 27 (28 Oct 2020 18:00) (17 - 30)  SpO2: 100% (28 Oct 2020 18:00) (95% - 100%)        PHYSICAL EXAM:  GENERAL: Intubated /vented, on CARLOTTA HUGGER  CHEST/LUNG: Not using accessory muscles   HEART: s1 and s2 present  ABDOMEN:  Nontender and  Nondistended  EXTREMITIES: No pedal  edema  CNS: Intubated/vented          LABS:                        9.5    9.40  )-----------( 29       ( 28 Oct 2020 16:35 )             27.9                           10.1   6.80  )-----------( 43       ( 27 Oct 2020 06:44 )             29.9         10-28    146<H>  |  117<H>  |  9   ----------------------------<  104<H>  3.4<L>   |  23  |  1.25    Ca    7.9<L>      28 Oct 2020 16:47  Phos  2.0     10-28  Mg     1.9     10-28    TPro  4.2<L>  /  Alb  2.1<L>  /  TBili  4.4<H>  /  DBili  x   /  AST  89<H>  /  ALT  66<H>  /  AlkPhos  110  10-28    10-27    148<H>  |  119<H>  |  9   ----------------------------<  76  4.0   |  24  |  0.89    Ca    8.1<L>      27 Oct 2020 06:44  Phos  2.4     10-27  Mg     2.1     10-27    TPro  4.4<L>  /  Alb  2.0<L>  /  TBili  4.2<H>  /  DBili  x   /  AST  85<H>  /  ALT  75<H>  /  AlkPhos  116  10-27      10-25    152<H>  |  123<H>  |  11  ----------------------------<  88  3.4<L>   |  23  |  1.21    Ca    8.5      25 Oct 2020 12:24  Phos  1.9     10-25  Mg     2.2     10-25    TPro  4.9<L>  /  Alb  2.6<L>  /  TBili  2.6<H>  /  DBili  x   /  AST  133<H>  /  ALT  94<H>  /  AlkPhos  116  10-25    TPro  4.2<L>  /  Alb  2.4<L>  /  TBili  2.4<H>  /  DBili  x   /  AST  208<H>  /  ALT  87<H>  /  AlkPhos  105  10-22        Procalcitonin, Serum (10.15.20 @ 11:48)   Procalcitonin, Serum: 0.16: Procalcitonin (PCT) Interpretation (ng/mL) - Diagnosis of systemic   bacterial infection/sepsis         MEDICATIONS  (STANDING):    albumin human 25% IVPB 50 milliLiter(s) IV Intermittent every 8 hours  chlorhexidine 0.12% Liquid 15 milliLiter(s) Oral Mucosa every 12 hours  chlorhexidine 2% Cloths 1 Application(s) Topical daily  lactulose Syrup 30 Gram(s) Oral every 6 hours  levoFLOXacin IVPB 750 milliGRAM(s) IV Intermittent every 24 hours  levoFLOXacin IVPB      midodrine. 10 milliGRAM(s) Oral three times a day  norepinephrine Infusion 0.05 MICROgram(s)/kG/Min (2.63 mL/Hr) IV Continuous <Continuous>  nystatin Powder 1 Application(s) Topical two times a day  pantoprazole  Injectable 40 milliGRAM(s) IV Push two times a day  polyethylene glycol 3350 17 Gram(s) Oral daily  potassium chloride   Powder 40 milliEquivalent(s) Oral once  propofol Infusion 10 MICROgram(s)/kG/Min (3.37 mL/Hr) IV Continuous <Continuous>  rifAXIMin 550 milliGRAM(s) Oral two times a day  vancomycin  IVPB 1000 milliGRAM(s) IV Intermittent every 12 hours        RADIOLOGY & ADDITIONAL TESTS:    10/25/20: Xray Chest 1 View- PORTABLE-Routine (Xray Chest 1 View- PORTABLE-Routine in AM.) (10.25.20 @ 09:21) Frontal expiratory view of the chest shows the heart to be similar in size. Endotracheal tube, right jugular line and feeding tube remain present.    The lungs show similar left upper lobe infiltrate with progression of right perihilar infiltrate. Pleural effusions are similar. There is no evidence of pneumothorax.      10/23/20 : Xray Chest 1 View-PORTABLE IMMEDIATE (Xray Chest 1 View-PORTABLE IMMEDIATE .) (10.23.20 @ 19:09) Feeding tube tip near GE junction as noted. Questionable right upper lobe infiltrate. Follow-up study is recommended as clinically warranted.      10/21/20 : CT Abdomen and Pelvis w/ Oral Cont and w/ IV Cont (10.21.20 @ 15:59) Mural thickening of the left colon and rectum. Liquid stool in the colon. Apparent segmental mural thickening of the mid to distal small bowel and the proximal duodenum. Findings may represent nonspecific enterocolitis, noninfectious inflammatory bowel disease, or ischemic bowel. Clinical correlation is recommended. The celiac axis artery, SMA, and KINA are patent without stenosis.    Dilatation of the mid small bowel is again noted. Oral contrast has reached the terminal ileum. Findings may represent small bowel obstruction, or ileus related to nonspecific enterocolitis.   Cholelithiasis.    Moderate to large ascites in the abdomen, increased since the previous examination. 5 mm nonspecific noncalcified left upper lobe lung nodule; if the patient's is in the high risk category (i.e. smoker), follow-up chest CT may be pursued in 12 months to ensure stability.    Combination of atelectasis and consolidation in the left lower lobe.    Possible 1.0 cm hypodense lesion in the left lobe of the thyroid. Thyroid ultrasound may be pursued for further evaluation.   Mild bilateral pleural effusions.    Aging determinate compression fracture at T5 vertebra.        10/13/20 : CT Abdomen and Pelvis w/ IV Cont (10.13.20 @ 18:50) Diffuse dilatation of small bowel loops without a clear transition is slightly increased, likely an ileus.  Decreased moderate ascites.    1.5 cm pancreatic versus peripancreatic soft tissue nodule    10/13/20 : Xray Chest 1 View- PORTABLE-Urgent (Xray Chest 1 View- PORTABLE-Urgent .) (10.13.20 @ 16:34) Clear lungs.          MICROBIOLOGY DATA:    Culture - Sputum . (10.26.20 @ 00:26)   - Ampicillin/Sulbactam: R <=8/4   - Cefazolin: R >16   - Ceftazidime: I 16   - Clindamycin: R >4   - Erythromycin: R >4   - Gentamicin: S <=1 Should not be used as monotherapy   - Levofloxacin: S <=0.5   - Linezolid: S 2   - Oxacillin: R >2   - Penicillin: R >8   - RIF- Rifampin: S <=1 Should not be used as monotherapy   - Tetra/Doxy: S <=1   - Trimethoprim/Sulfamethoxazole: S <=0.5/9.5   - Trimethoprim/Sulfamethoxazole: S <=0.5/9.5   - Vancomycin: S 1   Gram Stain:  Moderate polymorphonuclear leukocytes per low power field   Rare Squamous epithelial cells per low power field   Moderate Gram Positive Cocci in Clusters per oil power field   Moderate Yeast per oil power field   Specimen Source: .Sputum Sputum   Culture Results:  Moderate Methicillin resistant Staphylococcus aureus   Moderate Stenotrophomonas maltophilia   Normal Respiratory Erin present   Organism Identification: Methicillin resistant Staphylococcus aureus   Stenotrophomonas maltophilia   Organism: Methicillin resistant Staphylococcus aureus   Organism: Stenotrophomonas maltophilia       MRSA/MSSA PCR (10.21.20 @ 09:41)   MRSA PCR Result.: Detected:       Culture - Blood (10.20.20 @ 22:09)   Specimen Source: .Blood Blood   Culture Results:  No growth to date.     Culture - Blood (10.20.20 @ 22:09)   Specimen Source: .Blood Blood   Culture Results:   No growth to date.     Culture - Blood in AM (10.16.20 @ 10:13)   Specimen Source: .Blood Blood-Peripheral   Culture Results:   No growth to date.     Culture - Blood in AM (10.16.20 @ 10:13)   Specimen Source: .Blood Blood-Peripheral   Culture Results:   No growth to date.     Culture - Blood (10.13.20 @ 22:23)   Growth in anaerobic bottle: Gram Negative Rods   Specimen Source: .Blood Blood-Peripheral   Organism: Blood Culture PCR   Culture Results:   Growth in anaerobic bottle: Bacteroides fragilis   "Susceptibilities not performed"   ***Blood Panel PCR results on this specimen are available   approximately 3 hours after the Gram stain result.***   Gram stain, PCR, and/or culture results may not always   correspond due to difference in methodologies.     Culture - Blood (10.13.20 @ 22:23)   Specimen Source: .Blood Blood-Peripheral   Culture Results:   No growth to date.     Urine Microscopic-Add On (NC) (10.13.20 @ 21:39)   Bacteria: Moderate /HPF   Epithelial Cells: Moderate /HPF   Red Blood Cell - Urine: 5-10 /HPF   White Blood Cell - Urine: 6-10 /HPF                  Patient is seen and examined at the bed side, is Normothermic. No change in condition, remains on vent. and pressor.      REVIEW OF SYSTEMS: Unable to obtain due to mental status         ALLERGIES: No Known Allergies      ICU Vital Signs Last 24 Hrs  T(C): 36.4 (28 Oct 2020 16:45), Max: 37.3 (28 Oct 2020 08:15)  T(F): 97.5 (28 Oct 2020 16:45), Max: 99.1 (28 Oct 2020 08:15)  HR: 110 (28 Oct 2020 18:00) (85 - 122)  BP: 104/77 (28 Oct 2020 18:00) (78/50 - 119/84)  BP(mean): 84 (28 Oct 2020 18:00) (56 - 94)  ABP: --  ABP(mean): --  RR: 27 (28 Oct 2020 18:00) (17 - 30)  SpO2: 100% (28 Oct 2020 18:00) (95% - 100%)        PHYSICAL EXAM:  GENERAL: Intubated /vented,   CHEST/LUNG: Not using accessory muscles   HEART: s1 and s2 present  ABDOMEN:  Nontender and  Nondistended  EXTREMITIES: No pedal  edema  CNS: Intubated/vented          LABS:                        9.5    9.40  )-----------( 29       ( 28 Oct 2020 16:35 )             27.9                           10.1   6.80  )-----------( 43       ( 27 Oct 2020 06:44 )             29.9         10-28    146<H>  |  117<H>  |  9   ----------------------------<  104<H>  3.4<L>   |  23  |  1.25    Ca    7.9<L>      28 Oct 2020 16:47  Phos  2.0     10-28  Mg     1.9     10-28    TPro  4.2<L>  /  Alb  2.1<L>  /  TBili  4.4<H>  /  DBili  x   /  AST  89<H>  /  ALT  66<H>  /  AlkPhos  110  10-28    10-27    148<H>  |  119<H>  |  9   ----------------------------<  76  4.0   |  24  |  0.89    Ca    8.1<L>      27 Oct 2020 06:44  Phos  2.4     10-27  Mg     2.1     10-27    TPro  4.4<L>  /  Alb  2.0<L>  /  TBili  4.2<H>  /  DBili  x   /  AST  85<H>  /  ALT  75<H>  /  AlkPhos  116  10-27      10-25    152<H>  |  123<H>  |  11  ----------------------------<  88  3.4<L>   |  23  |  1.21    Ca    8.5      25 Oct 2020 12:24  Phos  1.9     10-25  Mg     2.2     10-25    TPro  4.9<L>  /  Alb  2.6<L>  /  TBili  2.6<H>  /  DBili  x   /  AST  133<H>  /  ALT  94<H>  /  AlkPhos  116  10-25    TPro  4.2<L>  /  Alb  2.4<L>  /  TBili  2.4<H>  /  DBili  x   /  AST  208<H>  /  ALT  87<H>  /  AlkPhos  105  10-22        Procalcitonin, Serum (10.15.20 @ 11:48)   Procalcitonin, Serum: 0.16: Procalcitonin (PCT) Interpretation (ng/mL) - Diagnosis of systemic   bacterial infection/sepsis         MEDICATIONS  (STANDING):    albumin human 25% IVPB 50 milliLiter(s) IV Intermittent every 8 hours  chlorhexidine 0.12% Liquid 15 milliLiter(s) Oral Mucosa every 12 hours  chlorhexidine 2% Cloths 1 Application(s) Topical daily  lactulose Syrup 30 Gram(s) Oral every 6 hours  levoFLOXacin IVPB 750 milliGRAM(s) IV Intermittent every 24 hours  levoFLOXacin IVPB      midodrine. 10 milliGRAM(s) Oral three times a day  norepinephrine Infusion 0.05 MICROgram(s)/kG/Min (2.63 mL/Hr) IV Continuous <Continuous>  nystatin Powder 1 Application(s) Topical two times a day  pantoprazole  Injectable 40 milliGRAM(s) IV Push two times a day  polyethylene glycol 3350 17 Gram(s) Oral daily  potassium chloride   Powder 40 milliEquivalent(s) Oral once  propofol Infusion 10 MICROgram(s)/kG/Min (3.37 mL/Hr) IV Continuous <Continuous>  rifAXIMin 550 milliGRAM(s) Oral two times a day  vancomycin  IVPB 1000 milliGRAM(s) IV Intermittent every 12 hours        RADIOLOGY & ADDITIONAL TESTS:    10/25/20: Xray Chest 1 View- PORTABLE-Routine (Xray Chest 1 View- PORTABLE-Routine in AM.) (10.25.20 @ 09:21) Frontal expiratory view of the chest shows the heart to be similar in size. Endotracheal tube, right jugular line and feeding tube remain present.    The lungs show similar left upper lobe infiltrate with progression of right perihilar infiltrate. Pleural effusions are similar. There is no evidence of pneumothorax.      10/23/20 : Xray Chest 1 View-PORTABLE IMMEDIATE (Xray Chest 1 View-PORTABLE IMMEDIATE .) (10.23.20 @ 19:09) Feeding tube tip near GE junction as noted. Questionable right upper lobe infiltrate. Follow-up study is recommended as clinically warranted.      10/21/20 : CT Abdomen and Pelvis w/ Oral Cont and w/ IV Cont (10.21.20 @ 15:59) Mural thickening of the left colon and rectum. Liquid stool in the colon. Apparent segmental mural thickening of the mid to distal small bowel and the proximal duodenum. Findings may represent nonspecific enterocolitis, noninfectious inflammatory bowel disease, or ischemic bowel. Clinical correlation is recommended. The celiac axis artery, SMA, and KINA are patent without stenosis.    Dilatation of the mid small bowel is again noted. Oral contrast has reached the terminal ileum. Findings may represent small bowel obstruction, or ileus related to nonspecific enterocolitis.   Cholelithiasis.    Moderate to large ascites in the abdomen, increased since the previous examination. 5 mm nonspecific noncalcified left upper lobe lung nodule; if the patient's is in the high risk category (i.e. smoker), follow-up chest CT may be pursued in 12 months to ensure stability.    Combination of atelectasis and consolidation in the left lower lobe.    Possible 1.0 cm hypodense lesion in the left lobe of the thyroid. Thyroid ultrasound may be pursued for further evaluation.   Mild bilateral pleural effusions.    Aging determinate compression fracture at T5 vertebra.        10/13/20 : CT Abdomen and Pelvis w/ IV Cont (10.13.20 @ 18:50) Diffuse dilatation of small bowel loops without a clear transition is slightly increased, likely an ileus.  Decreased moderate ascites.    1.5 cm pancreatic versus peripancreatic soft tissue nodule    10/13/20 : Xray Chest 1 View- PORTABLE-Urgent (Xray Chest 1 View- PORTABLE-Urgent .) (10.13.20 @ 16:34) Clear lungs.          MICROBIOLOGY DATA:    Culture - Sputum . (10.26.20 @ 00:26)   - Ampicillin/Sulbactam: R <=8/4   - Cefazolin: R >16   - Ceftazidime: I 16   - Clindamycin: R >4   - Erythromycin: R >4   - Gentamicin: S <=1 Should not be used as monotherapy   - Levofloxacin: S <=0.5   - Linezolid: S 2   - Oxacillin: R >2   - Penicillin: R >8   - RIF- Rifampin: S <=1 Should not be used as monotherapy   - Tetra/Doxy: S <=1   - Trimethoprim/Sulfamethoxazole: S <=0.5/9.5   - Trimethoprim/Sulfamethoxazole: S <=0.5/9.5   - Vancomycin: S 1   Gram Stain:  Moderate polymorphonuclear leukocytes per low power field   Rare Squamous epithelial cells per low power field   Moderate Gram Positive Cocci in Clusters per oil power field   Moderate Yeast per oil power field   Specimen Source: .Sputum Sputum   Culture Results:  Moderate Methicillin resistant Staphylococcus aureus   Moderate Stenotrophomonas maltophilia   Normal Respiratory Erin present   Organism Identification: Methicillin resistant Staphylococcus aureus   Stenotrophomonas maltophilia   Organism: Methicillin resistant Staphylococcus aureus   Organism: Stenotrophomonas maltophilia       MRSA/MSSA PCR (10.21.20 @ 09:41)   MRSA PCR Result.: Detected:       Culture - Blood (10.20.20 @ 22:09)   Specimen Source: .Blood Blood   Culture Results:  No growth to date.     Culture - Blood (10.20.20 @ 22:09)   Specimen Source: .Blood Blood   Culture Results:   No growth to date.     Culture - Blood in AM (10.16.20 @ 10:13)   Specimen Source: .Blood Blood-Peripheral   Culture Results:   No growth to date.     Culture - Blood in AM (10.16.20 @ 10:13)   Specimen Source: .Blood Blood-Peripheral   Culture Results:   No growth to date.     Culture - Blood (10.13.20 @ 22:23)   Growth in anaerobic bottle: Gram Negative Rods   Specimen Source: .Blood Blood-Peripheral   Organism: Blood Culture PCR   Culture Results:   Growth in anaerobic bottle: Bacteroides fragilis   "Susceptibilities not performed"   ***Blood Panel PCR results on this specimen are available   approximately 3 hours after the Gram stain result.***   Gram stain, PCR, and/or culture results may not always   correspond due to difference in methodologies.     Culture - Blood (10.13.20 @ 22:23)   Specimen Source: .Blood Blood-Peripheral   Culture Results:   No growth to date.     Urine Microscopic-Add On (NC) (10.13.20 @ 21:39)   Bacteria: Moderate /HPF   Epithelial Cells: Moderate /HPF   Red Blood Cell - Urine: 5-10 /HPF   White Blood Cell - Urine: 6-10 /HPF

## 2020-10-28 NOTE — PROGRESS NOTE ADULT - ASSESSMENT
Patient is a 65y old  Female from home, lives with daughter, ambulates with walker with PMH of recurrent SBO's, s/p exp lap, SB resection in 2015, ex lap, ALBINA in 2018, DVT, PE, on Xarelto, IVC filter, chronic leg swelling, and anasarca, Now send in to the ER by her PCP, Dr. Ross, for evaluation of  generalized weakness and hypotension BP of 80/40 during office visit. On admission, she found to have no fever and BP was in lower normal range and no Leukocytosis. The CXR is clear and CT abd/pelvis consistent with Ileus. The ID consult requested to assist with evaluation of infectious etiology of episode of hypotension. Found to have Bacteroides bacteremia and now developed septic shock on Cefepime and Flagyl. Hence transferred to ICU. 10/20/20.    # Hypotension  at office- BP of 80/40 - resolved   #  Bacteroides fragilis Bacteremia - 10/13/20 - ? source most likely GI- Repeat Blood Cxs have NGTD 10/16/20  # Ileus  - h/o recurrent SBO and s/p EXp. LAp  # COVID 19 negative   # Septic shock ( Hypothermia + hypotensive)- transferred to ICU since requiring pressor- source GI, The CT abd/pelvis shows nonspecific enterocolitis, noninfectious inflammatory bowel disease, or ischemic bowel, along with ileus.   # Pneumonia- HCAP vs Aspiration - on CXR 10/24 and CT chest 10/21- sputum Cx grew Methicillin resistant Staphylococcus aureus  and Stenotrophomonas maltophilia.      would recommend:    1. Continue Levaquin to cover Stenotrophomonas  2. Continue IV Vancomycin to cover MRSA  and  discontinue Meropenem   3. Aspiration precaution  4. Monitor kidney function and adjust Abx doses accordingly   5. Management of Vent as per ICU protocol  6. Monitor and keep Vancomycin level between 15 to 20    d/w ICU team       Attending Attestation:    Spent more than 45 minutes on total encounter, more than 50 % of the visit was spent counseling and/or coordinating care by the Attending physician. Patient is a 65y old  Female from home, lives with daughter, ambulates with walker with PMH of recurrent SBO's, s/p exp lap, SB resection in 2015, ex lap, ALBINA in 2018, DVT, PE, on Xarelto, IVC filter, chronic leg swelling, and anasarca, Now send in to the ER by her PCP, Dr. Ross, for evaluation of  generalized weakness and hypotension BP of 80/40 during office visit. On admission, she found to have no fever and BP was in lower normal range and no Leukocytosis. The CXR is clear and CT abd/pelvis consistent with Ileus. The ID consult requested to assist with evaluation of infectious etiology of episode of hypotension. Found to have Bacteroides bacteremia and now developed septic shock on Cefepime and Flagyl. Hence transferred to ICU. 10/20/20.    # Hypotension  at office- BP of 80/40 - resolved   #  Bacteroides fragilis Bacteremia - 10/13/20 - ? source most likely GI- Repeat Blood Cxs have NGTD 10/16/20  # Ileus  - h/o recurrent SBO and s/p EXp. LAp  # COVID 19 negative   # Septic shock ( Hypothermia + hypotensive)- transferred to ICU since requiring pressor- source GI, The CT abd/pelvis shows nonspecific enterocolitis, noninfectious inflammatory bowel disease, or ischemic bowel, along with ileus.   # Pneumonia- HCAP vs Aspiration - on CXR 10/24 and CT chest 10/21- sputum Cx grew Methicillin resistant Staphylococcus aureus  and Stenotrophomonas maltophilia.      would recommend:    1. Continue Levaquin to cover Stenotrophomonas  2. Continue IV Vancomycin to cover MRSA  and  Keep level between 15 to 20  3. Aspiration precaution  4. Monitor kidney function and adjust Abx doses accordingly   5. Management of Vent as per ICU protocol    d/w ICU team       Attending Attestation:    Spent more than 45 minutes on total encounter, more than 50 % of the visit was spent counseling and/or coordinating care by the Attending physician.

## 2020-10-28 NOTE — PROGRESS NOTE ADULT - SUBJECTIVE AND OBJECTIVE BOX
Patient was seen and examined  Patient is a 65y old  Female who presents with a chief complaint of Hypotension (28 Oct 2020 07:04)      INTERVAL HPI/OVERNIGHT EVENTS:  T(C): 37.3 (10-28-20 @ 08:15), Max: 37.3 (10-28-20 @ 08:15)  HR: 117 (10-28-20 @ 08:23) (85 - 117)  BP: 100/79 (10-28-20 @ 08:15) (78/50 - 119/84)  RR: 25 (10-28-20 @ 08:15) (21 - 25)  SpO2: 100% (10-28-20 @ 08:23) (98% - 100%)  Wt(kg): --  I&O's Summary    27 Oct 2020 07:01  -  28 Oct 2020 07:00  --------------------------------------------------------  IN: 4321.2 mL / OUT: 1585 mL / NET: 2736.2 mL    28 Oct 2020 07:01  -  28 Oct 2020 08:52  --------------------------------------------------------  IN: 50.3 mL / OUT: 10 mL / NET: 40.3 mL        LABS:                        9.5    7.07  )-----------( 32       ( 28 Oct 2020 06:31 )             27.6     10-28    147<H>  |  118<H>  |  9   ----------------------------<  110<H>  3.6   |  22  |  1.01    Ca    7.7<L>      28 Oct 2020 06:31  Phos  2.0     10-28  Mg     1.9     10-28    TPro  4.2<L>  /  Alb  2.1<L>  /  TBili  4.4<H>  /  DBili  x   /  AST  89<H>  /  ALT  66<H>  /  AlkPhos  110  10-28    PT/INR - ( 28 Oct 2020 06:31 )   PT: 19.8 sec;   INR: 1.71 ratio             CAPILLARY BLOOD GLUCOSE      POCT Blood Glucose.: 82 mg/dL (28 Oct 2020 06:14)  POCT Blood Glucose.: 105 mg/dL (27 Oct 2020 23:54)  POCT Blood Glucose.: 119 mg/dL (27 Oct 2020 16:55)  POCT Blood Glucose.: 123 mg/dL (27 Oct 2020 10:57)    LIPID PANEL  Cholesterol --  LDL --  HDL --  RATIO HDL/Total Cholesterol --  Triglyceride 58  LIPID PANEL  Cholesterol --  LDL --  HDL --  RATIO HDL/Total Cholesterol --  Triglyceride 56      ABG - ( 28 Oct 2020 04:10 )  pH, Arterial: 7.37  pH, Blood: x     /  pCO2: 38    /  pO2: 198   / HCO3: 21    / Base Excess: -2.9  /  SaO2: 98                    MEDICATIONS  (STANDING):  chlorhexidine 0.12% Liquid 15 milliLiter(s) Oral Mucosa every 12 hours  chlorhexidine 2% Cloths 1 Application(s) Topical daily  fentaNYL   Infusion. 1 MICROgram(s)/kG/Hr (5.62 mL/Hr) IV Continuous <Continuous>  lactulose Retention Enema 200 Gram(s) Rectal once  lactulose Syrup 30 Gram(s) Oral every 6 hours  levoFLOXacin IVPB 750 milliGRAM(s) IV Intermittent every 24 hours  levoFLOXacin IVPB      midodrine. 10 milliGRAM(s) Oral three times a day  norepinephrine Infusion 0.05 MICROgram(s)/kG/Min (2.63 mL/Hr) IV Continuous <Continuous>  nystatin Powder 1 Application(s) Topical two times a day  pantoprazole  Injectable 40 milliGRAM(s) IV Push two times a day  polyethylene glycol 3350 17 Gram(s) Oral daily  rifAXIMin 550 milliGRAM(s) Oral two times a day  vancomycin  IVPB 1000 milliGRAM(s) IV Intermittent every 12 hours    MEDICATIONS  (PRN):  sodium chloride 0.9% lock flush 10 milliLiter(s) IV Push every 1 hour PRN Pre/post blood products, medications, blood draw, and to maintain line patency      RADIOLOGY & ADDITIONAL TESTS:    Imaging Personally Reviewed:  [ ] YES  [ ] NO    REVIEW OF SYSTEMS:  unable       Consultant(s) Notes Reviewed:  [ ] YES  [ ] NO    PHYSICAL EXAM:  GENERAL: NAD, well-groomed, well-developed  HEAD:  Atraumatic, Normocephalic  EYES: EOMI, PERRLA, conjunctiva and sclera clear  ENMT: No tonsillar erythema, exudates, or enlargement; Moist mucous membranes, Good dentition, No lesions  NECK: Supple, No JVD, Normal thyroid  NERVOUS SYSTEM:  unable   CHEST/LUNG: bl rhonchi   HEART: Regular rate and rhythm; No murmurs, rubs, or gallops  ABDOMEN: Soft, Nontender, Nondistended; Bowel sounds present  EXTREMITIES:  2+ Peripheral Pulses, No clubbing, cyanosis, or edema  LYMPH: No lymphadenopathy noted  SKIN: No rashes or lesions    Care Discussed with Consultants/Other Providers [ x] YES  [ ] NO

## 2020-10-28 NOTE — PROGRESS NOTE ADULT - SUBJECTIVE AND OBJECTIVE BOX
INTERVAL HPI/OVERNIGHT EVENTS: ***    PRESSORS: [ ] YES [ ] NO  WHICH:    ANTIBIOTICS:                      Antimicrobial:  levoFLOXacin IVPB 750 milliGRAM(s) IV Intermittent every 24 hours  levoFLOXacin IVPB      rifAXIMin 550 milliGRAM(s) Oral two times a day  vancomycin  IVPB 1000 milliGRAM(s) IV Intermittent every 12 hours    Cardiovascular:  midodrine. 10 milliGRAM(s) Oral three times a day  norepinephrine Infusion 0.05 MICROgram(s)/kG/Min IV Continuous <Continuous>    Pulmonary:    Hematalogic:    Other:  chlorhexidine 0.12% Liquid 15 milliLiter(s) Oral Mucosa every 12 hours  chlorhexidine 2% Cloths 1 Application(s) Topical daily  fentaNYL   Infusion. 1 MICROgram(s)/kG/Hr IV Continuous <Continuous>  lactulose Syrup 30 Gram(s) Oral every 6 hours  nystatin Powder 1 Application(s) Topical two times a day  pantoprazole  Injectable 40 milliGRAM(s) IV Push two times a day  polyethylene glycol 3350 17 Gram(s) Oral daily  sodium chloride 0.9% lock flush 10 milliLiter(s) IV Push every 1 hour PRN    chlorhexidine 0.12% Liquid 15 milliLiter(s) Oral Mucosa every 12 hours  chlorhexidine 2% Cloths 1 Application(s) Topical daily  fentaNYL   Infusion. 1 MICROgram(s)/kG/Hr IV Continuous <Continuous>  lactulose Syrup 30 Gram(s) Oral every 6 hours  levoFLOXacin IVPB 750 milliGRAM(s) IV Intermittent every 24 hours  levoFLOXacin IVPB      midodrine. 10 milliGRAM(s) Oral three times a day  norepinephrine Infusion 0.05 MICROgram(s)/kG/Min IV Continuous <Continuous>  nystatin Powder 1 Application(s) Topical two times a day  pantoprazole  Injectable 40 milliGRAM(s) IV Push two times a day  polyethylene glycol 3350 17 Gram(s) Oral daily  rifAXIMin 550 milliGRAM(s) Oral two times a day  sodium chloride 0.9% lock flush 10 milliLiter(s) IV Push every 1 hour PRN  vancomycin  IVPB 1000 milliGRAM(s) IV Intermittent every 12 hours    Drug Dosing Weight  Height (cm): 167.6 (21 Oct 2020 02:26)  Weight (kg): 56.2 (21 Oct 2020 02:26)  BMI (kg/m2): 20 (21 Oct 2020 02:26)  BSA (m2): 1.63 (21 Oct 2020 02:26)    CENTRAL LINE: [ ] YES [ ] NO  LOCATION:   DATE INSERTED:  REMOVE: [ ] YES [ ] NO  EXPLAIN:    TREJO: [ ] YES [ ] NO    DATE INSERTED:  REMOVE:  [ ] YES [ ] NO  EXPLAIN:    A-LINE:  [ ] YES [ ] NO  LOCATION:   DATE INSERTED:  REMOVE:  [ ] YES [ ] NO  EXPLAIN:    PMH -reviewed admission note, no change since admission    ICU Vital Signs Last 24 Hrs  T(C): 36.7 (28 Oct 2020 04:41), Max: 36.7 (28 Oct 2020 04:41)  T(F): 98.1 (28 Oct 2020 04:41), Max: 98.1 (28 Oct 2020 04:41)  HR: 93 (28 Oct 2020 06:46) (85 - 117)  BP: 83/65 (28 Oct 2020 06:46) (78/50 - 119/84)  BP(mean): 69 (28 Oct 2020 06:46) (56 - 95)  ABP: --  ABP(mean): --  RR: 24 (28 Oct 2020 06:46) (21 - 25)  SpO2: 98% (28 Oct 2020 06:46) (98% - 100%)      ABG - ( 28 Oct 2020 04:10 )  pH, Arterial: 7.37  pH, Blood: x     /  pCO2: 38    /  pO2: 198   / HCO3: 21    / Base Excess: -2.9  /  SaO2: 98                    10-27 @ 07:01  -  10-28 @ 07:00  --------------------------------------------------------  IN: 4321.2 mL / OUT: 1585 mL / NET: 2736.2 mL        Mode: AC/ CMV (Assist Control/ Continuous Mandatory Ventilation)  RR (machine): 24  TV (machine): 375  FiO2: 50  PEEP: 5  ITime: 1  MAP: 11  PIP: 23      PHYSICAL EXAM:    GENERAL: NAD, well-groomed, well-developed  HEAD:  Atraumatic, Normocephalic  EYES: EOMI, PERRLA, conjunctiva and sclera clear  ENMT: No tonsillar erythema, exudates, or enlargement; Moist mucous membranes, Good dentition, No lesions  NECK: Supple, normal appearance, No JVD; Normal thyroid; Trachea midline  NERVOUS SYSTEM:  Alert & Oriented X3, Good concentration; Motor Strength 5/5 B/L upper and lower extremities; DTRs 2+ intact and symmetric  CHEST/LUNG: No chest deformity; Normal percussion bilaterally; No rales, rhonchi, wheezing   HEART: Regular rate and rhythm; No murmurs, rubs, or gallops  ABDOMEN: Soft, Nontender, Nondistended; Bowel sounds present  EXTREMITIES:  2+ Peripheral Pulses, No clubbing, cyanosis, or edema  LYMPH: No lymphadenopathy noted  SKIN: No rashes or lesions; Good capillary refill      LABS:  CBC Full  -  ( 28 Oct 2020 06:31 )  WBC Count : 7.07 K/uL  RBC Count : 2.96 M/uL  Hemoglobin : 9.5 g/dL  Hematocrit : 27.6 %  Platelet Count - Automated : 32 K/uL  Mean Cell Volume : 93.2 fl  Mean Cell Hemoglobin : 32.1 pg  Mean Cell Hemoglobin Concentration : 34.4 gm/dL  Auto Neutrophil # : x  Auto Lymphocyte # : x  Auto Monocyte # : x  Auto Eosinophil # : x  Auto Basophil # : x  Auto Neutrophil % : x  Auto Lymphocyte % : x  Auto Monocyte % : x  Auto Eosinophil % : x  Auto Basophil % : x    10-28    147<H>  |  118<H>  |  9   ----------------------------<  110<H>  3.6   |  22  |  x     Ca    7.7<L>      28 Oct 2020 06:31  Phos  2.4     10-27  Mg     1.9     10-28    TPro  x   /  Alb  2.1<L>  /  TBili  x   /  DBili  x   /  AST  x   /  ALT  x   /  AlkPhos  x   10-28    PT/INR - ( 28 Oct 2020 06:31 )   PT: 19.8 sec;   INR: 1.71 ratio             Culture Results:   Moderate Methicillin resistant Staphylococcus aureus  Moderate Stenotrophomonas maltophilia  Normal Respiratory Erin present (10-26 @ 00:26)      RADIOLOGY & ADDITIONAL STUDIES REVIEWED:  ***    GOALS OF CARE DISCUSSION WITH PATIENT/FAMILY/PROXY:    CRITICAL CARE TIME SPENT: 35 minutes INTERVAL HPI/OVERNIGHT EVENTS: Intubated and sedated. On fentanyl and levophed . No improvement on mental status. Pt needs to move the bowel for her high ammonia, only 1 BM was noted yesterday. Sputum grew MRSA and Stenotrophomonas sensitive to levo.    Pt was given lactulose enema in am, had a big BM. will switch fentanyl to propofol for the BM. Platelet trends down.     Central line: RIJ  Levophed 0.09  Fentanyl : 1mcg    ANTIBIOTICS:                      Antimicrobial:  levoFLOXacin IVPB 750 milliGRAM(s) IV Intermittent every 24 hours  levoFLOXacin IVPB      rifAXIMin 550 milliGRAM(s) Oral two times a day  vancomycin  IVPB 1000 milliGRAM(s) IV Intermittent every 12 hours    Cardiovascular:  midodrine. 10 milliGRAM(s) Oral three times a day  norepinephrine Infusion 0.05 MICROgram(s)/kG/Min IV Continuous <Continuous>    Pulmonary:    Hematalogic:    Other:  chlorhexidine 0.12% Liquid 15 milliLiter(s) Oral Mucosa every 12 hours  chlorhexidine 2% Cloths 1 Application(s) Topical daily  fentaNYL   Infusion. 1 MICROgram(s)/kG/Hr IV Continuous <Continuous>  lactulose Syrup 30 Gram(s) Oral every 6 hours  nystatin Powder 1 Application(s) Topical two times a day  pantoprazole  Injectable 40 milliGRAM(s) IV Push two times a day  polyethylene glycol 3350 17 Gram(s) Oral daily  sodium chloride 0.9% lock flush 10 milliLiter(s) IV Push every 1 hour PRN    chlorhexidine 0.12% Liquid 15 milliLiter(s) Oral Mucosa every 12 hours  chlorhexidine 2% Cloths 1 Application(s) Topical daily  fentaNYL   Infusion. 1 MICROgram(s)/kG/Hr IV Continuous <Continuous>  lactulose Syrup 30 Gram(s) Oral every 6 hours  levoFLOXacin IVPB 750 milliGRAM(s) IV Intermittent every 24 hours  levoFLOXacin IVPB      midodrine. 10 milliGRAM(s) Oral three times a day  norepinephrine Infusion 0.05 MICROgram(s)/kG/Min IV Continuous <Continuous>  nystatin Powder 1 Application(s) Topical two times a day  pantoprazole  Injectable 40 milliGRAM(s) IV Push two times a day  polyethylene glycol 3350 17 Gram(s) Oral daily  rifAXIMin 550 milliGRAM(s) Oral two times a day  sodium chloride 0.9% lock flush 10 milliLiter(s) IV Push every 1 hour PRN  vancomycin  IVPB 1000 milliGRAM(s) IV Intermittent every 12 hours    Drug Dosing Weight  Height (cm): 167.6 (21 Oct 2020 02:26)  Weight (kg): 56.2 (21 Oct 2020 02:26)  BMI (kg/m2): 20 (21 Oct 2020 02:26)  BSA (m2): 1.63 (21 Oct 2020 02:26)    CENTRAL LINE: [ ] YES [ ] NO  LOCATION:   DATE INSERTED:  REMOVE: [ ] YES [ ] NO  EXPLAIN:    TREJO: [ ] YES [ ] NO    DATE INSERTED:  REMOVE:  [ ] YES [ ] NO  EXPLAIN:    A-LINE:  [ ] YES [ ] NO  LOCATION:   DATE INSERTED:  REMOVE:  [ ] YES [ ] NO  EXPLAIN:    PMH -reviewed admission note, no change since admission    ICU Vital Signs Last 24 Hrs  T(C): 36.7 (28 Oct 2020 04:41), Max: 36.7 (28 Oct 2020 04:41)  T(F): 98.1 (28 Oct 2020 04:41), Max: 98.1 (28 Oct 2020 04:41)  HR: 93 (28 Oct 2020 06:46) (85 - 117)  BP: 83/65 (28 Oct 2020 06:46) (78/50 - 119/84)  BP(mean): 69 (28 Oct 2020 06:46) (56 - 95)  ABP: --  ABP(mean): --  RR: 24 (28 Oct 2020 06:46) (21 - 25)  SpO2: 98% (28 Oct 2020 06:46) (98% - 100%)      ABG - ( 28 Oct 2020 04:10 )  pH, Arterial: 7.37  pH, Blood: x     /  pCO2: 38    /  pO2: 198   / HCO3: 21    / Base Excess: -2.9  /  SaO2: 98                    10-27 @ 07:01  -  10-28 @ 07:00  --------------------------------------------------------  IN: 4321.2 mL / OUT: 1585 mL / NET: 2736.2 mL        Mode: AC/ CMV (Assist Control/ Continuous Mandatory Ventilation)  RR (machine): 24  TV (machine): 375  FiO2: 50  PEEP: 5  ITime: 1  MAP: 11  PIP: 23      PHYSICAL EXAM:  GENERAL: Intubated and sedated.  HEAD:  Atraumatic, Normocephalic  EYES: b/l reactive pupil   NECK: Supple, normal appearance, No JVD; Normal thyroid; Trachea midline  NERVOUS SYSTEM:  Alert & Oriented X0  CEST/LUNG: No chest deformity; b/l equal y  HEART: Regular rate and rhythm; No murmurs, rubs, or gallops  ABDOMEN: Soft, Nontender, Nondistended; Bowel sounds present  EXTREMITIES:  2+ Peripheral Pulses, No clubbing, cyanosis, +ve pitting edema of LE until knee and swollen hands  LYMPH: No lymphadenopathy noted  SKIN: No rashes or lesions; Good capillary refill      LABS:  CBC Full  -  ( 28 Oct 2020 06:31 )  WBC Count : 7.07 K/uL  RBC Count : 2.96 M/uL  Hemoglobin : 9.5 g/dL  Hematocrit : 27.6 %  Platelet Count - Automated : 32 K/uL  Mean Cell Volume : 93.2 fl  Mean Cell Hemoglobin : 32.1 pg  Mean Cell Hemoglobin Concentration : 34.4 gm/dL  Auto Neutrophil # : x  Auto Lymphocyte # : x  Auto Monocyte # : x  Auto Eosinophil # : x  Auto Basophil # : x  Auto Neutrophil % : x  Auto Lymphocyte % : x  Auto Monocyte % : x  Auto Eosinophil % : x  Auto Basophil % : x    10-28    147<H>  |  118<H>  |  9   ----------------------------<  110<H>  3.6   |  22  |  x     Ca    7.7<L>      28 Oct 2020 06:31  Phos  2.4     10-27  Mg     1.9     10-28    TPro  x   /  Alb  2.1<L>  /  TBili  x   /  DBili  x   /  AST  x   /  ALT  x   /  AlkPhos  x   10-28    PT/INR - ( 28 Oct 2020 06:31 )   PT: 19.8 sec;   INR: 1.71 ratio             Culture Results:   Moderate Methicillin resistant Staphylococcus aureus  Moderate Stenotrophomonas maltophilia  Normal Respiratory Erin present (10-26 @ 00:26)      RADIOLOGY & ADDITIONAL STUDIES REVIEWED:  ***    GOALS OF CARE DISCUSSION WITH PATIENT/FAMILY/PROXY:    CRITICAL CARE TIME SPENT: 35 minutes

## 2020-10-28 NOTE — PROGRESS NOTE ADULT - ASSESSMENT
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 Repeat CT 10/21 suggested  non-specific enterocolitis, noninfectious inflammatory bowel disease or ischemic bowel along with ileus, and concern for SBO given mild luminal narrowing at distal small bowel. Patient remained hypotensive, hypothermic, requiring pressor support, septic and got intubated on 10/24 and concern for HCAP vs. aspiration.  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 Liver enzymes were /are up-trending, along with bilirubin, possibly due to ongoing sepsis. Now sputum grew MRSA and Stenotrophomonas maltophilia.    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  (prior thrombosis workup?)  - No caudate lobe hypertrophy reported (present in 75% of cases), no macroregenerative nodules reported, characteristic pattern of parenchymal perfusion not described,, but study reported limited due to motion artifact   - Venography would be gold standard for diagnosis, d/w IR, if patient will be more stable (possibly outpatient), can be evaluated at [facility] for Dx and potential intervention, this time patient remains septic on pressor support  - was already on full dose anticoagulation, was being held b/o coagulopathy and now b/o severe thrombocytopenia    #Abnormal liver tests  - Elevated liver enzymes most likely due to ongoing sepsis (only mildly elevated or normal on admission and started to worsen when patient condition deteriorated), but can check hepatitis panel  -  mgmt. of sepsis per ICU  - bilirubin was mildly elevated but had severe coagulopathy, can check Factor VIII       # Ascites   - Dx paracentesis could be useful given ongoing sepsis (last 2 sets of BCx nl) but as d/w IR, no safe window based on last CT   (last was in 2/2019, when SAAG < 1.1 serum alb 1.0, fluid alb < 0.2, total prot < 1)     # Encephalopathy (even elevated ammonia can be multifactorial)  - c/w lactulose and c/w rifaximin   - consider CT head (on full dose AC)    # Anemia, thrombocytopenia   - possibly malnutrition plus chronic GI loss and sepsis  - hematology F/u appreciated  - FOBT pos, GI f/u    Will continue to follow  D/w primary team  Thank you for the consult

## 2020-10-28 NOTE — PROGRESS NOTE ADULT - SUBJECTIVE AND OBJECTIVE BOX
Patient was seen and examined at bedside. Full note to follow. Chief Complaint:  Patient is a 65y old  Female who presents with a chief complaint of Hypotension (28 Oct 2020 18:28)      Reason for consult: Budd Chiari?    Interval Events: Patient was seen and examined at bedside. Patient intubated. Unable to obtain ROS.      Hospital Medications:  albumin human 25% IVPB 50 milliLiter(s) IV Intermittent every 8 hours  chlorhexidine 0.12% Liquid 15 milliLiter(s) Oral Mucosa every 12 hours  chlorhexidine 2% Cloths 1 Application(s) Topical daily  lactulose Syrup 30 Gram(s) Oral every 6 hours  levoFLOXacin IVPB 750 milliGRAM(s) IV Intermittent every 24 hours  levoFLOXacin IVPB      midodrine. 10 milliGRAM(s) Oral three times a day  norepinephrine Infusion 0.05 MICROgram(s)/kG/Min IV Continuous <Continuous>  nystatin Powder 1 Application(s) Topical two times a day  pantoprazole  Injectable 40 milliGRAM(s) IV Push two times a day  polyethylene glycol 3350 17 Gram(s) Oral daily  propofol Infusion 10 MICROgram(s)/kG/Min IV Continuous <Continuous>  rifAXIMin 550 milliGRAM(s) Oral two times a day  sodium chloride 0.9% lock flush 10 milliLiter(s) IV Push every 1 hour PRN  vancomycin  IVPB 1000 milliGRAM(s) IV Intermittent every 12 hours      ROS:   Unable to obtain due to patient condition.     PHYSICAL EXAM:   Vital Signs:  Vital Signs Last 24 Hrs  T(C): 36.4 (28 Oct 2020 20:15), Max: 37.3 (28 Oct 2020 08:15)  T(F): 97.6 (28 Oct 2020 20:15), Max: 99.1 (28 Oct 2020 08:15)  HR: 111 (28 Oct 2020 21:00) (85 - 122)  BP: 110/89 (28 Oct 2020 21:00) (78/50 - 123/94)  BP(mean): 94 (28 Oct 2020 21:00) (56 - 107)  RR: 19 (28 Oct 2020 21:00) (15 - 30)  SpO2: 100% (28 Oct 2020 21:00) (95% - 100%)  Daily     Daily Weight in k.7 (28 Oct 2020 07:30)    GENERAL: critical  NEURO: sedated  CHEST: intubated, on vent, b/l air entry  CARDIAC: tachycardic  ABDOMEN: soft, non-tender, non-distended, no rebound or guarding, BS+  EXTREMITIES: no edema      LABS: reviewed                        9.5    9.40  )-----------(        ( 28 Oct 2020 16:35 )             27.9     10-28    146<H>  |  117<H>  |  9   ----------------------------<  104<H>  3.4<L>   |  23  |  1.25    Ca    7.9<L>      28 Oct 2020 16:47  Phos  2.0     10-28  Mg     1.9     10-28    TPro  4.2<L>  /  Alb  2.1<L>  /  TBili  4.4<H>  /  DBili  x   /  AST  89<H>  /  ALT  66<H>  /  AlkPhos  110  10-28    LIVER FUNCTIONS - ( 28 Oct 2020 06:31 )  Alb: 2.1 g/dL / Pro: 4.2 g/dL / ALK PHOS: 110 U/L / ALT: 66 U/L DA / AST: 89 U/L / GGT: x             Interval Diagnostic Studies: see sunrise for full report

## 2020-10-29 NOTE — PROGRESS NOTE ADULT - ASSESSMENT
65y Female from home, lives with daughter, ambulates with walker with PMH of recurrent SBO's, s/p exp lap, SB resection in 2015, ex lap, ALBINA in 2018, DVT, PE, on Xarelto, IVC filter, chronic leg swelling, and anasarca, came in to the ED due to hypotension during office visit. Patient was found to be bacteremic with bacteroids fragilis. Admitted in ICU due to hypotension and hypothermia. On vancomycin and levo . BCx X2 negative. Sputum positive for MRSA and Stenotrophomonas     Diagnosis:  >Encephalopathy  >Sepsis  >Bacteremia  >Anemia  >DVT/PE  >Transaminitis  >Aspiration pneumonia vs HCAP    --------------------------------------------Neuro--------------------------------------  -She is AAOx2 at baseline on evaluation.  -Encephalopathic and got intubated on 10/24  -Ammonia trending down from 152 > 130 > 116 >103>131>126  -On lactulose 30g q 6h and rifaximin  -Carl maganaer for hypothermia, Temp 95.6  -INR> 1.63>1.71      -------------------------------------CVS-------------------------------  -Patient is hypotensive and hypothermic secondary to sepsis.  -On abx Levo and vancomycin   - NG tube feeding   -Midodrine 10 mg TID  -RIJ for pressors on levophed  -Hold Lasix home med in the setting of hypotension  -Pt is in fluid overload with decrease urine output today as well, will give Lasix for fluid overload and albumin to maintain the BP  -previous ECHO 3-4 months back showed normal EF    --------------------------------Respiratory----------------------  -Intubated and sedated  -CT showed mild b/l pleural effusion and left lower lobe consolidation  -HCAP vs aspiration pneumonia  - sputum culture grew MRSA and Stenotrophomonas (sensitive to levo)  -On levo and vancomycin , next vanc trough tomorrow in the am  -ID Dr. Patricio    ----------------------------------------Gastrointestinal--------------------------------------  -Patient was bacteremic with bacteroids fragilis, likely secondary from intra abdominal source.  -Patient had multiple SBO in past. Ct abdomen on admission showed ileus, Repeat CT A/P showed ileus and non occlusive ischemic colitis  -No signs of acute abdomen   -Surgery recs > No intervention, pt was admitted in Jan diagnosed with budd Chiari syndrome with portal HTN recs to transfer to [facility] but pt is not stable to transfer  -LFTs are mildly elevated since admission, trending down. Likely due to sepsis vs hepatic failure sec to budd Chiari  -levo and vanc abx  -BCx X 2 negative.  -ammonia trends down from 150 to 130 >116>103>131>126, trend up today, on lactulose and rifaximin   -had 1BM ytd , was given enema this am and pt had a big BM  -monitor BM  -GI, DR Chatterjee deferred doing colonoscopy due to multiple comorbidities, FOBT positive on 10/15  -Dr Potts recs has been noted    -----------------------------------------ID---------------------------------------  -Patient was bacteremic with bacteroids likely Gi source.  -Lactate is normal 1.3  Procalcitonin 0.79  -On levo and vanc  - BP on the lower side, pt is on Levophed baby dose and midodrine 10mg TID  -Monitor vitals Q4      ---------------------------------------------Nephro-------------------------------------  -Kidney function is normal, Cl is 120 with  sodium trends up from 147>150.152>152>151>148>147  -TF  -free water 300q4  -f/u BMP in afternoon  -Monitor respiratory status    ----------------------------------------Hem Onc----------------------------------  -Has h/o DVt and PE in past. Patient is on Xarelto at home  -Patient has anemia  -anemia of chronic disease  -Hgb 6.9 1PRBC >8.7 >7 1PRBC> 8.5>1 PRBC> 8.5>9.7>9.6>10 9.5  -Plat trends down [id number]  -Hold AC for now, will closely monitor the patient.   -Folate and B12 normal.   -f/u CBC and BMP in afternoon  -Monitor CBC    ----------------------------------------Endo--------------------------------  -HgbA1c 4    ----------------------------------------Prophylaxis----------------------------  DVT: hold Lovenox for low platelets'  GI: PPI    ---------------------------------------GOC---------------------------  DNR        65y Female from home, lives with daughter, ambulates with walker with PMH of recurrent SBO's, s/p exp lap, SB resection in 2015, ex lap, ALBINA in 2018, DVT, PE, on Xarelto, IVC filter, chronic leg swelling, and anasarca, came in to the ED due to hypotension during office visit. Patient was found to be bacteremic with bacteroids fragilis. Admitted in ICU due to hypotension and hypothermia. On vancomycin and levo . BCx X2 negative. Sputum positive for MRSA and Stenotrophomonas.     Diagnosis:  >Encephalopathy  >Sepsis  >Bacteremia  >Anemia  >DVT/PE  >Transaminitis  >Aspiration pneumonia vs HCAP    --------------------------------------------Neuro--------------------------------------  -She is AAOx2 at baseline on evaluation.  -Encephalopathic and got intubated on 10/24  -Ammonia trending down from 152 > 130 > 116 >103>131>126  -On lactulose 30g q 6h and rifaximin  -Carl maganaer for hypothermia, Temp 95.6  -INR> 1.63>1.71      -------------------------------------CVS-------------------------------  -Patient is hypotensive and hypothermic secondary to sepsis.  -On abx Levo and vancomycin   - NG tube feeding   -Midodrine 10 mg TID  -RIJ for pressors on levophed  -Hold Lasix home med in the setting of hypotension  -Pt is in fluid overload with decrease urine output today as well, will give Lasix for fluid overload and albumin to maintain the BP  -previous ECHO 3-4 months back showed normal EF    --------------------------------Respiratory----------------------  -Intubated and sedated  -CT showed mild b/l pleural effusion and left lower lobe consolidation  -HCAP vs aspiration pneumonia  - sputum culture grew MRSA and Stenotrophomonas (sensitive to levo)  -On levo and vancomycin , next vanc trough tomorrow in the am  -ID Dr. Patricio    ----------------------------------------Gastrointestinal--------------------------------------  -Patient was bacteremic with bacteroids fragilis, likely secondary from intra abdominal source.  -Patient had multiple SBO in past. Ct abdomen on admission showed ileus, Repeat CT A/P showed ileus and non occlusive ischemic colitis  -No signs of acute abdomen   -Surgery recs > No intervention, pt was admitted in Jan diagnosed with budd Chiari syndrome with portal HTN recs to transfer to [facility] but pt is not stable to transfer  -LFTs are mildly elevated since admission, trending down. Likely due to sepsis vs hepatic failure sec to budd Chiari  -levo and vanc abx  -BCx X 2 negative.  -ammonia trends down from 150 to 130 >116>103>131>126, trend up today, on lactulose and rifaximin   -had 1BM ytd , was given enema this am and pt had a big BM  -monitor BM  -GI, DR Chatterjee deferred doing colonoscopy due to multiple comorbidities, FOBT positive on 10/15  -Dr Potts recs has been noted    -----------------------------------------ID---------------------------------------  -Patient was bacteremic with bacteroids likely Gi source.  -Lactate is normal 1.3  Procalcitonin 0.79  -On levo and vanc  - BP on the lower side, pt is on Levophed baby dose and midodrine 10mg TID  -Monitor vitals Q4      ---------------------------------------------Nephro-------------------------------------  -Kidney function is normal, Cl is 120 with  sodium trends up from 147>150.152>152>151>148>147  -TF  -free water 300q4  -f/u BMP in afternoon  -Monitor respiratory status    ----------------------------------------Hem Onc----------------------------------  -Has h/o DVt and PE in past. Patient is on Xarelto at home  -Patient has anemia  -anemia of chronic disease  -Hgb 6.9 1PRBC >8.7 >7 1PRBC> 8.5>1 PRBC> 8.5>9.7>9.6>10 9.5  -Plat trends down [id number]  -Hold AC for now, will closely monitor the patient.   -Folate and B12 normal.   -f/u CBC and BMP in afternoon  -Monitor CBC    ----------------------------------------Endo--------------------------------  -HgbA1c 4    ----------------------------------------Prophylaxis----------------------------  DVT: hold Lovenox for low platelets'  GI: PPI    ---------------------------------------GOC---------------------------  DNR        65y Female from home, lives with daughter, ambulates with walker with PMH of recurrent SBO's, s/p exp lap, SB resection in 2015, ex lap, ALBINA in 2018, DVT, PE, on Xarelto, IVC filter, chronic leg swelling, and anasarca, came in to the ED due to hypotension during office visit. Patient was found to be bacteremic with bacteroids fragilis. Admitted in ICU due to hypotension and hypothermia. On vancomycin and levo . BCx X2 negative. Sputum positive for MRSA and Stenotrophomonas.     Diagnosis:  >Encephalopathy  >Sepsis  >Bacteremia  >Anemia  >DVT/PE  >Transaminitis  >Aspiration pneumonia vs HCAP    --------------------------------------------Neuro--------------------------------------  -She is AAOx2 at baseline on evaluation.  -Encephalopathic and got intubated on 10/24  -Ammonia trending down from 152 > 130 > 116 >103>131>126>90  -On lactulose 30g q 6h and rifaximin  -Carl aguedagger for hypothermia, temp 96-97 in last 24 hr  -INR> 1.63>1.71>1.91      -------------------------------------CVS-------------------------------  -Patient is hypotensive and hypothermic secondary to sepsis.  -On abx Levo, vanc stopped for high vanc trough of 36  - NG tube feeding   -Midodrine 10 mg TID  -RIJ for pressors on levophed  -Hold Lasix home med in the setting of hypotension  -Pt is in fluid overload with decrease urine output today as well with positive net, will give Lasix for fluid overload and albumin to maintain the BP q8h  -previous ECHO 3-4 months back showed normal EF but new ECHO showed EF 15-20% with severe left ventricular dysfunction  -pt with multiorgan dysfunction could be sec to septic shock or could be autoimmune,   -f/u mixed venous gas from [facility] for perfusion, f/u on autoimmune workup KATHY, anticardiolipin abs , ANCA    --------------------------------Respiratory----------------------  -Intubated, not on any sedation  -CT showed mild b/l pleural effusion and left lower lobe consolidation in ICU on day 2  -HCAP vs aspiration pneumonia  - sputum culture grew MRSA and Stenotrophomonas (sensitive to levo)  -On levo and held vanc due to high trough  -ID Dr. Patricio    ----------------------------------------Gastrointestinal--------------------------------------  -Patient was bacteremic with bacteroids fragilis, likely secondary from intra abdominal source.  -Patient had multiple SBO in past. Ct abdomen on admission showed ileus, Repeat CT A/P showed ileus and non occlusive ischemic colitis  -No signs of acute abdomen   -Surgery recs > No intervention, pt was admitted in Jan diagnosed with budd Chiari syndrome with portal HTN recs to transfer to [facility] but pt is not stable to transfer  -LFTs are mildly elevated since admission, trending down. Likely due to sepsis vs hepatic failure sec to budd Chiari  -BCx X 2 negative.  -ammonia trends down from 150 to 130 >116>103>131>126>90, on lactulose and rifaximin   -had 1BM this am, 2 BM ytd   -monitor BM  -GI, DR Chatterjee deferred doing colonoscopy due to multiple comorbidities, FOBT positive on 10/15  -Dr Potts recs has been noted    -----------------------------------------ID---------------------------------------  -Patient was bacteremic with bacteroids likely GI source.  -Lactate is normal 1.3 but trends up to 2.3  -f/u lactate    -Procalcitonin 0.79  -On levo and held vanc due to high trough of 36, will check vanc trough in am   - BP on the lower side, pt is on Levophed baby dose and midodrine 10mg TID  -Monitor vitals Q4      ---------------------------------------------Nephro-------------------------------------  -Kidney function is normal, Cl is 120 with  sodium trends down today 147>150.152>152>151>148>147>142  -TF  discontinue free water 300q4  -Monitor UO    ----------------------------------------Hem Onc----------------------------------  -Has h/o DVt and PE in past. Patient is on Xarelto at home  -Patient has anemia  -anemia of chronic disease  -Hgb 6.9 1PRBC >8.7 >7 1PRBC> 8.5>1 PRBC> 8.5>9.7>9.6>10> 9.5>8.9  -Plat trends down [id number]>30965, no active bleeding  -Hold AC for now, will closely monitor the patient for any bleed  -Folate and B12 normal.     ----------------------------------------Endo--------------------------------  -HgbA1c 4    ----------------------------------------Prophylaxis----------------------------  DVT: hold Lovenox for low platelets'  GI: PPI    ---------------------------------------GOC---------------------------  DNR

## 2020-10-29 NOTE — PROGRESS NOTE ADULT - ASSESSMENT
Patient is a 65y old  Female from home, lives with daughter, ambulates with walker with PMH of recurrent SBO's, s/p exp lap, SB resection in 2015, ex lap, ALBINA in 2018, DVT, PE, on Xarelto, IVC filter, chronic leg swelling, and anasarca, Now send in to the ER by her PCP, Dr. Ross, for evaluation of  generalized weakness and hypotension BP of 80/40 during office visit. On admission, she found to have no fever and BP was in lower normal range and no Leukocytosis. The CXR is clear and CT abd/pelvis consistent with Ileus. The ID consult requested to assist with evaluation of infectious etiology of episode of hypotension. Found to have Bacteroides bacteremia and now developed septic shock on Cefepime and Flagyl. Hence transferred to ICU. 10/20/20.    # Hypotension  at office- BP of 80/40 - resolved   #  Bacteroides fragilis Bacteremia - 10/13/20 - ? source most likely GI- Repeat Blood Cxs have NGTD 10/16/20  # Ileus  - h/o recurrent SBO and s/p EXp. LAp  # COVID 19 negative   # Septic shock ( Hypothermia + hypotensive)- transferred to ICU since requiring pressor- source GI, The CT abd/pelvis shows nonspecific enterocolitis, noninfectious inflammatory bowel disease, or ischemic bowel, along with ileus.   # Pneumonia- HCAP vs Aspiration - on CXR 10/24 and CT chest 10/21- sputum Cx grew Methicillin resistant Staphylococcus aureus  and Stenotrophomonas maltophilia.      would recommend:    1. Please check Vancomycin level daily and restart at lower dose when level is less than 20  2. Continue Levaquin to cover Stenotrophomonas  3. Aspiration precaution  4. Monitor kidney function and adjust Abx doses accordingly   5. Management of Vent as per ICU protocol    d/w ICU team     Attending Attestation:    Spent more than 45 minutes on total encounter, more than 50 % of the visit was spent counseling and/or coordinating care by the Attending physician.

## 2020-10-29 NOTE — PROGRESS NOTE ADULT - SUBJECTIVE AND OBJECTIVE BOX
INTERVAL HPI/OVERNIGHT EVENTS: ***    PRESSORS: [ ] YES [ ] NO  WHICH:    ANTIBIOTICS:                      Antimicrobial:  levoFLOXacin IVPB 750 milliGRAM(s) IV Intermittent every 24 hours  levoFLOXacin IVPB      rifAXIMin 550 milliGRAM(s) Oral two times a day    Cardiovascular:  furosemide   Injectable 40 milliGRAM(s) IV Push every 6 hours  midodrine. 10 milliGRAM(s) Oral three times a day  norepinephrine Infusion 0.05 MICROgram(s)/kG/Min IV Continuous <Continuous>    Pulmonary:    Hematalogic:    Other:  albumin human 25% IVPB 50 milliLiter(s) IV Intermittent every 6 hours  chlorhexidine 0.12% Liquid 15 milliLiter(s) Oral Mucosa every 12 hours  chlorhexidine 2% Cloths 1 Application(s) Topical daily  lactulose Syrup 30 Gram(s) Oral every 6 hours  nystatin Powder 1 Application(s) Topical two times a day  pantoprazole  Injectable 40 milliGRAM(s) IV Push two times a day  polyethylene glycol 3350 17 Gram(s) Oral daily  sodium chloride 0.9% lock flush 10 milliLiter(s) IV Push every 1 hour PRN    albumin human 25% IVPB 50 milliLiter(s) IV Intermittent every 6 hours  chlorhexidine 0.12% Liquid 15 milliLiter(s) Oral Mucosa every 12 hours  chlorhexidine 2% Cloths 1 Application(s) Topical daily  furosemide   Injectable 40 milliGRAM(s) IV Push every 6 hours  lactulose Syrup 30 Gram(s) Oral every 6 hours  levoFLOXacin IVPB 750 milliGRAM(s) IV Intermittent every 24 hours  levoFLOXacin IVPB      midodrine. 10 milliGRAM(s) Oral three times a day  norepinephrine Infusion 0.05 MICROgram(s)/kG/Min IV Continuous <Continuous>  nystatin Powder 1 Application(s) Topical two times a day  pantoprazole  Injectable 40 milliGRAM(s) IV Push two times a day  polyethylene glycol 3350 17 Gram(s) Oral daily  rifAXIMin 550 milliGRAM(s) Oral two times a day  sodium chloride 0.9% lock flush 10 milliLiter(s) IV Push every 1 hour PRN    Drug Dosing Weight  Height (cm): 167.6 (21 Oct 2020 02:26)  Weight (kg): 56.2 (21 Oct 2020 02:26)  BMI (kg/m2): 20 (21 Oct 2020 02:26)  BSA (m2): 1.63 (21 Oct 2020 02:26)    CENTRAL LINE: [ ] YES [ ] NO  LOCATION:   DATE INSERTED:  REMOVE: [ ] YES [ ] NO  EXPLAIN:    TREJO: [ ] YES [ ] NO    DATE INSERTED:  REMOVE:  [ ] YES [ ] NO  EXPLAIN:    A-LINE:  [ ] YES [ ] NO  LOCATION:   DATE INSERTED:  REMOVE:  [ ] YES [ ] NO  EXPLAIN:    PMH -reviewed admission note, no change since admission    ICU Vital Signs Last 24 Hrs  T(C): 36.7 (29 Oct 2020 08:00), Max: 37.3 (28 Oct 2020 09:30)  T(F): 98.1 (29 Oct 2020 08:00), Max: 99.1 (28 Oct 2020 09:30)  HR: 108 (29 Oct 2020 09:00) (106 - 122)  BP: 99/76 (29 Oct 2020 09:00) (98/73 - 123/94)  BP(mean): 81 (29 Oct 2020 09:00) (79 - 107)  ABP: --  ABP(mean): --  RR: 19 (29 Oct 2020 09:00) (14 - 30)  SpO2: 100% (29 Oct 2020 09:00) (95% - 100%)      ABG - ( 29 Oct 2020 04:19 )  pH, Arterial: 7.41  pH, Blood: x     /  pCO2: 31    /  pO2: 154   / HCO3: 19    / Base Excess: -4.0  /  SaO2: 98                    10-28 @ 07:01  -  10-29 @ 07:00  --------------------------------------------------------  IN: 3689.9 mL / OUT: 1420 mL / NET: 2269.9 mL        Mode: AC/ CMV (Assist Control/ Continuous Mandatory Ventilation)  RR (machine): 24  TV (machine): 375  FiO2: 40  PEEP: 5  ITime: 1  MAP: 11  PIP: 22      PHYSICAL EXAM:    GENERAL: NAD, well-groomed, well-developed  HEAD:  Atraumatic, Normocephalic  EYES: EOMI, PERRLA, conjunctiva and sclera clear  ENMT: No tonsillar erythema, exudates, or enlargement; Moist mucous membranes, Good dentition, No lesions  NECK: Supple, normal appearance, No JVD; Normal thyroid; Trachea midline  NERVOUS SYSTEM:  Alert & Oriented X3, Good concentration; Motor Strength 5/5 B/L upper and lower extremities; DTRs 2+ intact and symmetric  CHEST/LUNG: No chest deformity; Normal percussion bilaterally; No rales, rhonchi, wheezing   HEART: Regular rate and rhythm; No murmurs, rubs, or gallops  ABDOMEN: Soft, Nontender, Nondistended; Bowel sounds present  EXTREMITIES:  2+ Peripheral Pulses, No clubbing, cyanosis, or edema  LYMPH: No lymphadenopathy noted  SKIN: No rashes or lesions; Good capillary refill      LABS:  CBC Full  -  ( 29 Oct 2020 05:11 )  WBC Count : 8.82 K/uL  RBC Count : 2.80 M/uL  Hemoglobin : 8.9 g/dL  Hematocrit : 26.3 %  Platelet Count - Automated : 27 K/uL  Mean Cell Volume : 93.9 fl  Mean Cell Hemoglobin : 31.8 pg  Mean Cell Hemoglobin Concentration : 33.8 gm/dL  Auto Neutrophil # : x  Auto Lymphocyte # : x  Auto Monocyte # : x  Auto Eosinophil # : x  Auto Basophil # : x  Auto Neutrophil % : x  Auto Lymphocyte % : x  Auto Monocyte % : x  Auto Eosinophil % : x  Auto Basophil % : x    10-29    142  |  114<H>  |  9   ----------------------------<  112<H>  3.5   |  22  |  1.36<H>    Ca    7.8<L>      29 Oct 2020 05:11  Phos  2.0     10-28  Mg     1.9     10-28    TPro  4.4<L>  /  Alb  2.3<L>  /  TBili  5.5<H>  /  DBili  x   /  AST  104<H>  /  ALT  67<H>  /  AlkPhos  113  10-29    PT/INR - ( 29 Oct 2020 05:11 )   PT: 22.8 sec;   INR: 1.98 ratio         PTT - ( 29 Oct 2020 05:11 )  PTT:56.5 sec        RADIOLOGY & ADDITIONAL STUDIES REVIEWED:  ***    GOALS OF CARE DISCUSSION WITH PATIENT/FAMILY/PROXY:    CRITICAL CARE TIME SPENT: 35 minutes INTERVAL HPI/OVERNIGHT EVENTS: Intubated. not on any sedation. Pt had 2xBM overnight. 1 BM this am. vanc trough was 36 this morning. Held the vancomycin today and will check the trough level tomorrow in am and will restart the vancomycin after the next trough. Pt assessed at bedside this am , moving her eye balls on sternal rub. Have gag reflex on suctioning with some thick mucous secretions.     ETT: 10/24  RIJ: 10/21  NGT: 10/24  FC: 10/25  Vent: 24/375/40/5  ABGs: 7.41/31/154/19    ANTIBIOTICS:                      Antimicrobial:  levoFLOXacin IVPB 750 milliGRAM(s) IV Intermittent every 24 hours  levoFLOXacin IVPB      rifAXIMin 550 milliGRAM(s) Oral two times a day    Cardiovascular:  furosemide   Injectable 40 milliGRAM(s) IV Push every 6 hours  midodrine. 10 milliGRAM(s) Oral three times a day  norepinephrine Infusion 0.05 MICROgram(s)/kG/Min IV Continuous <Continuous>    Pulmonary:    Hematalogic:    Other:  albumin human 25% IVPB 50 milliLiter(s) IV Intermittent every 6 hours  chlorhexidine 0.12% Liquid 15 milliLiter(s) Oral Mucosa every 12 hours  chlorhexidine 2% Cloths 1 Application(s) Topical daily  lactulose Syrup 30 Gram(s) Oral every 6 hours  nystatin Powder 1 Application(s) Topical two times a day  pantoprazole  Injectable 40 milliGRAM(s) IV Push two times a day  polyethylene glycol 3350 17 Gram(s) Oral daily  sodium chloride 0.9% lock flush 10 milliLiter(s) IV Push every 1 hour PRN    albumin human 25% IVPB 50 milliLiter(s) IV Intermittent every 6 hours  chlorhexidine 0.12% Liquid 15 milliLiter(s) Oral Mucosa every 12 hours  chlorhexidine 2% Cloths 1 Application(s) Topical daily  furosemide   Injectable 40 milliGRAM(s) IV Push every 6 hours  lactulose Syrup 30 Gram(s) Oral every 6 hours  levoFLOXacin IVPB 750 milliGRAM(s) IV Intermittent every 24 hours  levoFLOXacin IVPB      midodrine. 10 milliGRAM(s) Oral three times a day  norepinephrine Infusion 0.05 MICROgram(s)/kG/Min IV Continuous <Continuous>  nystatin Powder 1 Application(s) Topical two times a day  pantoprazole  Injectable 40 milliGRAM(s) IV Push two times a day  polyethylene glycol 3350 17 Gram(s) Oral daily  rifAXIMin 550 milliGRAM(s) Oral two times a day  sodium chloride 0.9% lock flush 10 milliLiter(s) IV Push every 1 hour PRN    Drug Dosing Weight  Height (cm): 167.6 (21 Oct 2020 02:26)  Weight (kg): 56.2 (21 Oct 2020 02:26)  BMI (kg/m2): 20 (21 Oct 2020 02:26)  BSA (m2): 1.63 (21 Oct 2020 02:26)    CENTRAL LINE: [ ] YES [ ] NO  LOCATION:   DATE INSERTED:  REMOVE: [ ] YES [ ] NO  EXPLAIN:    TREJO: [ ] YES [ ] NO    DATE INSERTED:  REMOVE:  [ ] YES [ ] NO  EXPLAIN:    A-LINE:  [ ] YES [ ] NO  LOCATION:   DATE INSERTED:  REMOVE:  [ ] YES [ ] NO  EXPLAIN:    PMH -reviewed admission note, no change since admission    ICU Vital Signs Last 24 Hrs  T(C): 36.7 (29 Oct 2020 08:00), Max: 37.3 (28 Oct 2020 09:30)  T(F): 98.1 (29 Oct 2020 08:00), Max: 99.1 (28 Oct 2020 09:30)  HR: 108 (29 Oct 2020 09:00) (106 - 122)  BP: 99/76 (29 Oct 2020 09:00) (98/73 - 123/94)  BP(mean): 81 (29 Oct 2020 09:00) (79 - 107)  ABP: --  ABP(mean): --  RR: 19 (29 Oct 2020 09:00) (14 - 30)  SpO2: 100% (29 Oct 2020 09:00) (95% - 100%)      ABG - ( 29 Oct 2020 04:19 )  pH, Arterial: 7.41  pH, Blood: x     /  pCO2: 31    /  pO2: 154   / HCO3: 19    / Base Excess: -4.0  /  SaO2: 98                    10-28 @ 07:01  -  10-29 @ 07:00  --------------------------------------------------------  IN: 3689.9 mL / OUT: 1420 mL / NET: 2269.9 mL        Mode: AC/ CMV (Assist Control/ Continuous Mandatory Ventilation)  RR (machine): 24  TV (machine): 375  FiO2: 40  PEEP: 5  ITime: 1  MAP: 11  PIP: 22      PHYSICAL EXAM:    GENERAL: Intubated  HEAD:  Atraumatic, Normocephalic  EYES: b/l pupils reactive to light   NECK: Supple, normal appearance, No JVD; Normal thyroid; Trachea midline  NERVOUS SYSTEM:  Alert & Oriented X0, rolling eye balls on sternal rub  CHEST/LUNG: No chest deformity; b/l equal air entry with rohnchi  HEART: Regular rate and rhythm; No murmurs, rubs, or gallops  ABDOMEN: Soft, Nontender, mildly distended; Bowel sounds present  EXTREMITIES:  2+ Peripheral Pulses, No clubbing, cyanosis, or edema  LYMPH: No lymphadenopathy noted  SKIN: No rashes or lesions; Good capillary refill      LABS:  CBC Full  -  ( 29 Oct 2020 05:11 )  WBC Count : 8.82 K/uL  RBC Count : 2.80 M/uL  Hemoglobin : 8.9 g/dL  Hematocrit : 26.3 %  Platelet Count - Automated : 27 K/uL  Mean Cell Volume : 93.9 fl  Mean Cell Hemoglobin : 31.8 pg  Mean Cell Hemoglobin Concentration : 33.8 gm/dL  Auto Neutrophil # : x  Auto Lymphocyte # : x  Auto Monocyte # : x  Auto Eosinophil # : x  Auto Basophil # : x  Auto Neutrophil % : x  Auto Lymphocyte % : x  Auto Monocyte % : x  Auto Eosinophil % : x  Auto Basophil % : x    10-29    142  |  114<H>  |  9   ----------------------------<  112<H>  3.5   |  22  |  1.36<H>    Ca    7.8<L>      29 Oct 2020 05:11  Phos  2.0     10-28  Mg     1.9     10-28    TPro  4.4<L>  /  Alb  2.3<L>  /  TBili  5.5<H>  /  DBili  x   /  AST  104<H>  /  ALT  67<H>  /  AlkPhos  113  10-29    PT/INR - ( 29 Oct 2020 05:11 )   PT: 22.8 sec;   INR: 1.98 ratio         PTT - ( 29 Oct 2020 05:11 )  PTT:56.5 sec        RADIOLOGY & ADDITIONAL STUDIES REVIEWED:  ***    GOALS OF CARE DISCUSSION WITH PATIENT/FAMILY/PROXY:    CRITICAL CARE TIME SPENT: 35 minutes INTERVAL HPI/OVERNIGHT EVENTS: Intubated. not on any sedation. Pt had 2xBM overnight. 1 BM this am. vanc trough was 36 this morning. Held the vancomycin today and will check the trough level tomorrow in am and will restart the vancomycin after the next trough. Pt assessed at bedside this am , moving her eye balls on sternal rub. Have gag reflex on suctioning with some thick mucous secretions.     ETT: 10/24  RIJ: 10/21  NGT: 10/24  FC: 10/25  Vent: 24/375/40/5  ABGs: 7.41/31/154/19  I/O: Net +ve 2 lit    ANTIBIOTICS:                      Antimicrobial:  levoFLOXacin IVPB 750 milliGRAM(s) IV Intermittent every 24 hours  levoFLOXacin IVPB      rifAXIMin 550 milliGRAM(s) Oral two times a day    Cardiovascular:  furosemide   Injectable 40 milliGRAM(s) IV Push every 6 hours  midodrine. 10 milliGRAM(s) Oral three times a day  norepinephrine Infusion 0.05 MICROgram(s)/kG/Min IV Continuous <Continuous>    Pulmonary:    Hematalogic:    Other:  albumin human 25% IVPB 50 milliLiter(s) IV Intermittent every 6 hours  chlorhexidine 0.12% Liquid 15 milliLiter(s) Oral Mucosa every 12 hours  chlorhexidine 2% Cloths 1 Application(s) Topical daily  lactulose Syrup 30 Gram(s) Oral every 6 hours  nystatin Powder 1 Application(s) Topical two times a day  pantoprazole  Injectable 40 milliGRAM(s) IV Push two times a day  polyethylene glycol 3350 17 Gram(s) Oral daily  sodium chloride 0.9% lock flush 10 milliLiter(s) IV Push every 1 hour PRN    albumin human 25% IVPB 50 milliLiter(s) IV Intermittent every 6 hours  chlorhexidine 0.12% Liquid 15 milliLiter(s) Oral Mucosa every 12 hours  chlorhexidine 2% Cloths 1 Application(s) Topical daily  furosemide   Injectable 40 milliGRAM(s) IV Push every 6 hours  lactulose Syrup 30 Gram(s) Oral every 6 hours  levoFLOXacin IVPB 750 milliGRAM(s) IV Intermittent every 24 hours  levoFLOXacin IVPB      midodrine. 10 milliGRAM(s) Oral three times a day  norepinephrine Infusion 0.05 MICROgram(s)/kG/Min IV Continuous <Continuous>  nystatin Powder 1 Application(s) Topical two times a day  pantoprazole  Injectable 40 milliGRAM(s) IV Push two times a day  polyethylene glycol 3350 17 Gram(s) Oral daily  rifAXIMin 550 milliGRAM(s) Oral two times a day  sodium chloride 0.9% lock flush 10 milliLiter(s) IV Push every 1 hour PRN    Drug Dosing Weight  Height (cm): 167.6 (21 Oct 2020 02:26)  Weight (kg): 56.2 (21 Oct 2020 02:26)  BMI (kg/m2): 20 (21 Oct 2020 02:26)  BSA (m2): 1.63 (21 Oct 2020 02:26)    CENTRAL LINE: [ ] YES [ ] NO  LOCATION:   DATE INSERTED:  REMOVE: [ ] YES [ ] NO  EXPLAIN:    TREJO: [ ] YES [ ] NO    DATE INSERTED:  REMOVE:  [ ] YES [ ] NO  EXPLAIN:    A-LINE:  [ ] YES [ ] NO  LOCATION:   DATE INSERTED:  REMOVE:  [ ] YES [ ] NO  EXPLAIN:    PMH -reviewed admission note, no change since admission    ICU Vital Signs Last 24 Hrs  T(C): 36.7 (29 Oct 2020 08:00), Max: 37.3 (28 Oct 2020 09:30)  T(F): 98.1 (29 Oct 2020 08:00), Max: 99.1 (28 Oct 2020 09:30)  HR: 108 (29 Oct 2020 09:00) (106 - 122)  BP: 99/76 (29 Oct 2020 09:00) (98/73 - 123/94)  BP(mean): 81 (29 Oct 2020 09:00) (79 - 107)  ABP: --  ABP(mean): --  RR: 19 (29 Oct 2020 09:00) (14 - 30)  SpO2: 100% (29 Oct 2020 09:00) (95% - 100%)      ABG - ( 29 Oct 2020 04:19 )  pH, Arterial: 7.41  pH, Blood: x     /  pCO2: 31    /  pO2: 154   / HCO3: 19    / Base Excess: -4.0  /  SaO2: 98                    10-28 @ 07:01  -  10-29 @ 07:00  --------------------------------------------------------  IN: 3689.9 mL / OUT: 1420 mL / NET: 2269.9 mL        Mode: AC/ CMV (Assist Control/ Continuous Mandatory Ventilation)  RR (machine): 24  TV (machine): 375  FiO2: 40  PEEP: 5  ITime: 1  MAP: 11  PIP: 22      PHYSICAL EXAM:    GENERAL: Intubated  HEAD:  Atraumatic, Normocephalic  EYES: b/l pupils reactive to light   NECK: Supple, normal appearance, No JVD; Normal thyroid; Trachea midline  NERVOUS SYSTEM:  Alert & Oriented X0, rolling eye balls on sternal rub  CHEST/LUNG: No chest deformity; b/l equal air entry with rohnchi  HEART: Regular rate and rhythm; No murmurs, rubs, or gallops  ABDOMEN: Soft, Nontender, mildly distended; Bowel sounds present  EXTREMITIES:  2+ Peripheral Pulses, No clubbing, cyanosis, or edema  LYMPH: No lymphadenopathy noted  SKIN: No rashes or lesions; Good capillary refill      LABS:  CBC Full  -  ( 29 Oct 2020 05:11 )  WBC Count : 8.82 K/uL  RBC Count : 2.80 M/uL  Hemoglobin : 8.9 g/dL  Hematocrit : 26.3 %  Platelet Count - Automated : 27 K/uL  Mean Cell Volume : 93.9 fl  Mean Cell Hemoglobin : 31.8 pg  Mean Cell Hemoglobin Concentration : 33.8 gm/dL  Auto Neutrophil # : x  Auto Lymphocyte # : x  Auto Monocyte # : x  Auto Eosinophil # : x  Auto Basophil # : x  Auto Neutrophil % : x  Auto Lymphocyte % : x  Auto Monocyte % : x  Auto Eosinophil % : x  Auto Basophil % : x    10-29    142  |  114<H>  |  9   ----------------------------<  112<H>  3.5   |  22  |  1.36<H>    Ca    7.8<L>      29 Oct 2020 05:11  Phos  2.0     10-28  Mg     1.9     10-28    TPro  4.4<L>  /  Alb  2.3<L>  /  TBili  5.5<H>  /  DBili  x   /  AST  104<H>  /  ALT  67<H>  /  AlkPhos  113  10-29    PT/INR - ( 29 Oct 2020 05:11 )   PT: 22.8 sec;   INR: 1.98 ratio         PTT - ( 29 Oct 2020 05:11 )  PTT:56.5 sec        RADIOLOGY & ADDITIONAL STUDIES REVIEWED:  ***    GOALS OF CARE DISCUSSION WITH PATIENT/FAMILY/PROXY:    CRITICAL CARE TIME SPENT: 35 minutes

## 2020-10-29 NOTE — PROGRESS NOTE ADULT - ASSESSMENT
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  BC grew bacteroides.    Problem/Recommendation - 1:  Problem: Bacteremia. Recommendation: bacteroides  most likely GI origin  on antibiotics  he has hypothermia and hypotension and elevated lactic acid  in ICU now  on  pressor  check cortisol level to r/o adrenal insuff  multiorgan failure  Problem/Recommendation - 2:  ·  Problem: Anemia.  Recommendation: ferritin, b12 folate normal  no hemolysis  most likley due to malnutrition from short bowel syndrome.she also had GIB multiple times before.   Problem/Recommendation - 3:  ·  Problem: Acute deep vein thrombosis (DVT) of other vein of upper extremity.  Recommendation: she has been on xarelto for 2 years.  DVT at left arm and left leg before.  in all CT scan i did not see report of IVC or hepatic vein thrombosis  off xarelto and lovenox due to elevated PT/PTT  4. elevated PT/PTT due to malnutrition and antibiotics causing VITK def  now it is mostly recovered  it begins to rise again, correctable by mixing study  probably due to liver failure this time  she develops DVT very quickly while off AC  need DVT prophylaxis, sq heparin  5. thrombocytopenia  new onset, most likely from sepsis or medication, procal is rising  the other possibilties causing multiorgan failure, such as autoimmune, vasculitis, HLH, catastrophic APS(less likely)  6. CHF, new onset global hypokinesia,   cardiomyopathy, ?etiology

## 2020-10-29 NOTE — PROGRESS NOTE ADULT - ASSESSMENT
SEPSIS MULTIORGAN FAILURE  PROGNOSIS POOR   RAMACHANDRAN AS PER ICU     65y Female from home, lives with daughter, ambulates with walker with PMH of recurrent SBO's, s/p exp lap, SB resection in 2015, ex lap, ALBINA in 2018, DVT, PE, on Xarelto, IVC filter, chronic leg swelling, and anasarca, came in to the ED due to hypotension during office visit. Patient was found to be bacteremic with bacteroids fragilis. Admitted in ICU due to hypotension and hypothermia. On vancomycin and levo . BCx X2 negative. Sputum positive for MRSA and Stenotrophomonas     Diagnosis:  >Encephalopathy  >Sepsis  >Bacteremia  >Anemia  >DVT/PE  >Transaminitis  >Aspiration pneumonia vs HCAP    --------------------------------------------Neuro--------------------------------------  -She is AAOx2 at baseline on evaluation.  -Encephalopathic and got intubated on 10/24  -Ammonia trending down from 152 > 130 > 116 >103>131>126  -On lactulose 30g q 6h and rifaximin  -Carl barrientos for hypothermia, Temp 95.6  -INR> 1.63>1.71      -------------------------------------CVS-------------------------------  -Patient is hypotensive and hypothermic secondary to sepsis.  -On abx Levo and vancomycin   - NG tube feeding   -Midodrine 10 mg TID  -RIJ for pressors on levophed  -Hold Lasix home med in the setting of hypotension  -Pt is in fluid overload with decrease urine output today as well, will give Lasix for fluid overload and albumin to maintain the BP  -previous ECHO 3-4 months back showed normal EF    --------------------------------Respiratory----------------------  -Intubated and sedated  -CT showed mild b/l pleural effusion and left lower lobe consolidation  -HCAP vs aspiration pneumonia  - sputum culture grew MRSA and Stenotrophomonas (sensitive to levo)  -On levo and vancomycin , next vanc trough tomorrow in the am  -ID Dr. Patricio    ----------------------------------------Gastrointestinal--------------------------------------  -Patient was bacteremic with bacteroids fragilis, likely secondary from intra abdominal source.  -Patient had multiple SBO in past. 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 Likely due to sepsis vs hepatic failure sec to budd Chiari  -levo and vanc abx  -BCx X 2 negative.  -ammonia trends down from 150 to 130 >116>103>131>126, trend up today, on lactulose and rifaximin   -had 1BM ytd , was given enema this am and pt had a big BM  -monitor BM  -GI, DR Chatterjee deferred doing colonoscopy due to multiple comorbidities, FOBT positive on 10/15  -Dr Potts recs has been noted    -----------------------------------------ID---------------------------------------  -Patient was bacteremic with bacteroids likely Gi source.  -Lactate is normal 1.3  Procalcitonin 0.79  -On levo and vanc  - BP on the lower side, pt is on Levophed baby dose and midodrine 10mg TID  -Monitor vitals Q4      ---------------------------------------------Nephro-------------------------------------  -Kidney function is normal, Cl is 120 with  sodium trends up from 147>150.152>152>151>148>147  -TF  -free water 300q4  -f/u BMP in afternoon  -Monitor respiratory status    ----------------------------------------Hem Onc----------------------------------  -Has h/o DVt and PE in past. Patient is on Xarelto at home  -Patient has anemia  -anemia of chronic disease  -Hgb 6.9 1PRBC >8.7 >7 1PRBC> 8.5>1 PRBC> 8.5>9.7>9.6>10 9.5  -Plat trends down [id number]  -Hold AC for now, will closely monitor the patient.   -Folate and B12 normal.   -f/u CBC and BMP in afternoon  -Monitor CBC    ----------------------------------------Endo--------------------------------  -HgbA1c 4    ----------------------------------------Prophylaxis----------------------------  DVT: hold Lovenox for low platelets'  GI: PPI    ---------------------------------------GOC---------------------------  DNR

## 2020-10-29 NOTE — PROGRESS NOTE ADULT - SUBJECTIVE AND OBJECTIVE BOX
Patient was seen and examined  Patient is a 65y old  Female who presents with a chief complaint of Hypotension (29 Oct 2020 09:29)  hypoxia sullivan as per icu       INTERVAL HPI/OVERNIGHT EVENTS:  T(C): 36.7 (10-29-20 @ 08:00), Max: 37.3 (10-29-20 @ 05:30)  HR: 108 (10-29-20 @ 09:15) (106 - 122)  BP: 98/76 (10-29-20 @ 09:15) (98/73 - 123/94)  RR: 23 (10-29-20 @ 09:15) (14 - 30)  SpO2: 100% (10-29-20 @ 09:15) (95% - 100%)  Wt(kg): --  I&O's Summary    28 Oct 2020 07:01  -  29 Oct 2020 07:00  --------------------------------------------------------  IN: 3689.9 mL / OUT: 1420 mL / NET: 2269.9 mL    29 Oct 2020 07:01  -  29 Oct 2020 09:47  --------------------------------------------------------  IN: 0 mL / OUT: 70 mL / NET: -70 mL        LABS:                        8.9    8.82  )-----------( 27       ( 29 Oct 2020 05:11 )             26.3     10-29    142  |  114<H>  |  9   ----------------------------<  112<H>  3.5   |  22  |  1.36<H>    Ca    7.8<L>      29 Oct 2020 05:11  Phos  2.0     10-28  Mg     1.9     10-28    TPro  4.4<L>  /  Alb  2.3<L>  /  TBili  5.5<H>  /  DBili  x   /  AST  104<H>  /  ALT  67<H>  /  AlkPhos  113  10-29    PT/INR - ( 29 Oct 2020 05:11 )   PT: 22.8 sec;   INR: 1.98 ratio         PTT - ( 29 Oct 2020 05:11 )  PTT:56.5 sec    CAPILLARY BLOOD GLUCOSE      POCT Blood Glucose.: 101 mg/dL (29 Oct 2020 05:00)  POCT Blood Glucose.: 131 mg/dL (28 Oct 2020 22:52)  POCT Blood Glucose.: 110 mg/dL (28 Oct 2020 17:05)  POCT Blood Glucose.: 96 mg/dL (28 Oct 2020 11:31)    LIPID PANEL  Cholesterol --  LDL --  HDL --  RATIO HDL/Total Cholesterol --  Triglyceride 69  LIPID PANEL  Cholesterol --  LDL --  HDL --  RATIO HDL/Total Cholesterol --  Triglyceride 59  LIPID PANEL  Cholesterol --  LDL --  HDL --  RATIO HDL/Total Cholesterol --  Triglyceride 58      ABG - ( 29 Oct 2020 04:19 )  pH, Arterial: 7.41  pH, Blood: x     /  pCO2: 31    /  pO2: 154   / HCO3: 19    / Base Excess: -4.0  /  SaO2: 98                    MEDICATIONS  (STANDING):  albumin human 25% IVPB 50 milliLiter(s) IV Intermittent every 6 hours  chlorhexidine 0.12% Liquid 15 milliLiter(s) Oral Mucosa every 12 hours  chlorhexidine 2% Cloths 1 Application(s) Topical daily  furosemide   Injectable 40 milliGRAM(s) IV Push every 6 hours  lactulose Syrup 30 Gram(s) Oral every 6 hours  levoFLOXacin IVPB 750 milliGRAM(s) IV Intermittent every 24 hours  levoFLOXacin IVPB      midodrine. 10 milliGRAM(s) Oral three times a day  norepinephrine Infusion 0.05 MICROgram(s)/kG/Min (2.63 mL/Hr) IV Continuous <Continuous>  nystatin Powder 1 Application(s) Topical two times a day  pantoprazole  Injectable 40 milliGRAM(s) IV Push two times a day  polyethylene glycol 3350 17 Gram(s) Oral daily  rifAXIMin 550 milliGRAM(s) Oral two times a day  thiamine Injectable 200 milliGRAM(s) IV Push three times a day    MEDICATIONS  (PRN):  sodium chloride 0.9% lock flush 10 milliLiter(s) IV Push every 1 hour PRN Pre/post blood products, medications, blood draw, and to maintain line patency      RADIOLOGY & ADDITIONAL TESTS:    Imaging Personally Reviewed:  [ ] YES  [ ] NO    REVIEW OF SYSTEMS:  unable       Consultant(s) Notes Reviewed:  [ x ] YES  [ ] NO    PHYSICAL EXAM:  GENERAL: NAD, well-groomed, well-developed  HEAD:  Atraumatic, Normocephalic  EYES: EOMI, PERRLA, conjunctiva and sclera clear  ENMT: No tonsillar erythema, exudates, or enlargement; Moist mucous membranes, Good dentition, No lesions  NECK: Supple, No JVD, Normal thyroid  NERVOUS SYSTEM:  on vent   CHEST/LUNG: Clear to percussion bilaterally; No rales, rhonchi, wheezing, or rubs  HEART: Regular rate and rhythm; No murmurs, rubs, or gallops  ABDOMEN: distended   EXTREMITIES:  2+ Peripheral Pulses, No clubbing, cyanosis, or edema  LYMPH: No lymphadenopathy noted  SKIN: No rashes or lesions    Care Discussed with Consultants/Other Providers [ x] YES  [ ] NO

## 2020-10-29 NOTE — PROGRESS NOTE ADULT - SUBJECTIVE AND OBJECTIVE BOX
on vent  no fever or hypothermia  still on levophed  no bleeding      MEDICATIONS  (STANDING):  albumin human 25% IVPB 50 milliLiter(s) IV Intermittent every 6 hours  chlorhexidine 0.12% Liquid 15 milliLiter(s) Oral Mucosa every 12 hours  chlorhexidine 2% Cloths 1 Application(s) Topical daily  furosemide   Injectable 40 milliGRAM(s) IV Push every 6 hours  lactulose Syrup 30 Gram(s) Oral every 6 hours  levoFLOXacin IVPB 750 milliGRAM(s) IV Intermittent every 24 hours  levoFLOXacin IVPB      midodrine. 10 milliGRAM(s) Oral three times a day  norepinephrine Infusion 0.05 MICROgram(s)/kG/Min (2.63 mL/Hr) IV Continuous <Continuous>  nystatin Powder 1 Application(s) Topical two times a day  pantoprazole  Injectable 40 milliGRAM(s) IV Push two times a day  polyethylene glycol 3350 17 Gram(s) Oral daily  rifAXIMin 550 milliGRAM(s) Oral two times a day    MEDICATIONS  (PRN):  sodium chloride 0.9% lock flush 10 milliLiter(s) IV Push every 1 hour PRN Pre/post blood products, medications, blood draw, and to maintain line patency      Allergies    No Known Allergies    Intolerances        Vital Signs Last 24 Hrs  T(C): 36.7 (29 Oct 2020 08:00), Max: 37.3 (28 Oct 2020 09:30)  T(F): 98.1 (29 Oct 2020 08:00), Max: 99.1 (28 Oct 2020 09:30)  HR: 108 (29 Oct 2020 09:15) (106 - 122)  BP: 98/76 (29 Oct 2020 09:15) (98/73 - 123/94)  BP(mean): 80 (29 Oct 2020 09:15) (79 - 107)  RR: 23 (29 Oct 2020 09:15) (14 - 30)  SpO2: 100% (29 Oct 2020 09:15) (95% - 100%)    PHYSICAL EXAM  General: adult in NAD  HEENT: clear oropharynx, anicteric sclera, pink conjunctiva  Neck: supple  CV: normal S1/S2 with no murmur rubs or gallops  Lungs: positive air movement b/l ant lungs,clear to auscultation, no wheezes, no rales  Abdomen: soft non-tender non-distended, no hepatosplenomegaly  Ext: no clubbing cyanosis or edema  Skin: no rashes and no petechiae  Neuro: alert and oriented X 4, no focal deficits  LABS:                          8.9    8.82  )-----------( 27       ( 29 Oct 2020 05:11 )             26.3         Mean Cell Volume : 93.9 fl  Mean Cell Hemoglobin : 31.8 pg  Mean Cell Hemoglobin Concentration : 33.8 gm/dL  Auto Neutrophil # : x  Auto Lymphocyte # : x  Auto Monocyte # : x  Auto Eosinophil # : x  Auto Basophil # : x  Auto Neutrophil % : x  Auto Lymphocyte % : x  Auto Monocyte % : x  Auto Eosinophil % : x  Auto Basophil % : x    Serial CBC  Hematocrit 26.3  Hemoglobin 8.9  Plat 27  RBC 2.80  WBC 8.82  Serial CBC  Hematocrit 27.9  Hemoglobin 9.5  Plat 29  RBC 2.99  WBC 9.40  Serial CBC  Hematocrit 27.6  Hemoglobin 9.5  Plat 32  RBC 2.96  WBC 7.07  Serial CBC  Hematocrit 29.9  Hemoglobin 10.1  Plat 43  RBC 3.21  WBC 6.80  Serial CBC  Hematocrit 29.6  Hemoglobin 10.1  Plat 45  RBC 3.16  WBC 5.96  Serial CBC  Hematocrit 28.6  Hemoglobin 9.6  Plat 48  RBC 3.06  WBC 3.70  Serial CBC  Hematocrit 28.7  Hemoglobin 9.6  Plat 52  RBC 3.07  WBC 3.38  Serial CBC  Hematocrit 27.5  Hemoglobin 9.2  Plat 58  RBC 2.93  WBC 3.33    10-29    142  |  114<H>  |  9   ----------------------------<  112<H>  3.5   |  22  |  1.36<H>    Ca    7.8<L>      29 Oct 2020 05:11  Phos  2.0     10-28  Mg     1.9     10-28    TPro  4.4<L>  /  Alb  2.3<L>  /  TBili  5.5<H>  /  DBili  x   /  AST  104<H>  /  ALT  67<H>  /  AlkPhos  113  10-29      PT/INR - ( 29 Oct 2020 05:11 )   PT: 22.8 sec;   INR: 1.98 ratio         PTT - ( 29 Oct 2020 05:11 )  PTT:56.5 sec    Ferritin, Serum: 810 ng/mL (10-22 @ 22:11)  Iron - Total Binding Capacity.: 63 ug/dL (10-22 @ 13:40)            BLOOD SMEAR INTERPRETATION:       RADIOLOGY & ADDITIONAL STUDIES:  < from: Transthoracic Echocardiogram (10.27.20 @ 09:56) >  OBSERVATIONS:  Mitral Valve: Normal mitral valve. Trace mitral  regurgitation.  Aortic Root: Normal aortic root.  Aortic Valve: Normal trileaflet aortic valve. Trace aortic  regurgitation.  Left Atrium: LA volume index = 13 cc/m2.  LeftVentricle: Severe global left ventricular systolic  dysfunction. Normal left ventricular internal dimensions  and wall thicknesses.  Right Heart: Normal right atrium. Right ventricular  enlargement with decreased RV systolic function. There is  moderate tricuspid regurgitation. There is trace pulmonic  regurgitation.  Pericardium/PleuraNormal pericardium with no pericardial  effusion. Bilateral pleural effusions.  Hemodynamic: RA Pressure is 8 mm Hg. RV systolic pressure  is 35 mm Hg.  ------------------------------------------------------------------------  CONCLUSIONS:  1. Trace mitral regurgitation.  2.  Trace aortic regurgitation.  3. Normal left ventricular internal dimensions and wall  thicknesses.  4. Severe global left ventricular systolic dysfunction.  5. Right ventricular enlargement with decreased RV systolic    < end of copied text >  < from: Transthoracic Echocardiogram (10.27.20 @ 09:56) >  function.  6. Bilateral pleural effusions.    < end of copied text >

## 2020-10-29 NOTE — PROGRESS NOTE ADULT - ASSESSMENT
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 Repeat CT 10/21 suggested  non-specific enterocolitis, noninfectious inflammatory bowel disease or ischemic bowel along with ileus, and concern for SBO given mild luminal narrowing at distal small bowel. Patient remained hypotensive, hypothermic, requiring pressor support, septic and got intubated on 10/24 and concern for HCAP vs. aspiration.  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 Liver enzymes were /are up-trending, along with bilirubin, was suspected due to ongoing sepsis. Now sputum grew MRSA and Stenotrophomonas maltophilia.    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 (as per d/w primary team, it was non-conclusive)  - No caudate lobe hypertrophy reported (present in 75% of cases), no macroregenerative nodules reported, characteristic pattern of parenchymal perfusion not described,, but study reported limited due to motion artifact   - Venography would be gold standard for diagnosis, discussed with IR, if patient will be more stable (possibly outpatient), can be evaluated at [facility] for Dx and potential intervention, this time patient remains septic on pressor support  - was already on full dose anticoagulation, was being held b/o coagulopathy and now b/o severe thrombocytopenia  (Portal vein was patent)  # Abnormal liver enzymes with hyperbilirubinemia and coagulopathy  # Ascites  - Elevated liver enzymes most likely due to ongoing sepsis (only mildly elevated or normal on admission and started to worsen when patient condition deteriorated, became hypotensive, hypothermic; AST 46-28-...248...-104; ALT 70-45...105-..67; ALP nl 113; Se bi 0.6-1.4-...5.5)  - Mgmt. of sepsis per ICU  - Bilirubin was mildly elevated but had severe coagulopathy, can check Factor VIII   - Bilirubin still up-trending with normal ALP, can fractionate bilirubin  - Also noted severely reduced LVEF (10-15%) and RV dysfunction, thus hepatic congestion can also contribute (had nl EF in 5/2020)  - Dx paracentesis could be useful (last 2 sets of BCx nl) but as d/w IR, no safe window based on last CT  - Consider repeat US abd bedside  - Given that still up-trending bilirubin, and coagulopathy, can consider re-sending full liver workup (prior liver workup: KATHY neg, AMA neg, HBsAg neg, HBcAb IgM neg, HAV IgM neg)    # Encephalopathy  - consider CT head (on full dose AC)  - c/w lactulose and c/w rifaximin       # Anemia, thrombocytopenia   - possibly malnutrition plus chronic GI loss and sepsis  - Hematology following, appreciated  - FOBT pos, GI f/u    Will continue to follow  D/w primary team  Thank you for the consult

## 2020-10-29 NOTE — PROGRESS NOTE ADULT - SUBJECTIVE AND OBJECTIVE BOX
Patient is seen and examined at the bed side, is Normothermic.  She is off sedation but not awake yet, remains  on vent. and Low dose pressor. The Vancomycin level is high.       REVIEW OF SYSTEMS: Unable to obtain due to mental status         ALLERGIES: No Known Allergies        ICU Vital Signs Last 24 Hrs  T(C): 36.4 (29 Oct 2020 16:00), Max: 37.3 (29 Oct 2020 05:30)  T(F): 97.6 (29 Oct 2020 16:00), Max: 99.1 (29 Oct 2020 05:30)  HR: 116 (29 Oct 2020 18:15) (106 - 118)  BP: 98/75 (29 Oct 2020 18:15) (98/66 - 123/94)  BP(mean): 79 (29 Oct 2020 18:15) (75 - 107)  ABP: --  ABP(mean): --  RR: 30 (29 Oct 2020 18:15) (14 - 30)  SpO2: 100% (29 Oct 2020 18:15) (98% - 100%)          PHYSICAL EXAM:  GENERAL: Intubated /vented,   CHEST/LUNG: Not using accessory muscles   HEART: s1 and s2 present  ABDOMEN:  Nontender and  Nondistended  EXTREMITIES: No pedal  edema  CNS: Intubated/vented          LABS:                        8.9    8.82  )-----------( 27       ( 29 Oct 2020 05:11 )             26.3                           9.5    9.40  )-----------( 29       ( 28 Oct 2020 16:35 )             27.9           10-29    142  |  114<H>  |  9   ----------------------------<  112<H>  3.5   |  22  |  1.36<H>    Ca    7.8<L>      29 Oct 2020 05:11  Phos  2.0     10-28  Mg     1.9     10-28    TPro  4.4<L>  /  Alb  2.3<L>  /  TBili  5.5<H>  /  DBili  x   /  AST  104<H>  /  ALT  67<H>  /  AlkPhos  113  10-29    10-28    146<H>  |  117<H>  |  9   ----------------------------<  104<H>  3.4<L>   |  23  |  1.25    Ca    7.9<L>      28 Oct 2020 16:47  Phos  2.0     10-28  Mg     1.9     10-28    TPro  4.2<L>  /  Alb  2.1<L>  /  TBili  4.4<H>  /  DBili  x   /  AST  89<H>  /  ALT  66<H>  /  AlkPhos  110  10-28      Vancomycin Level, Trough (10.29.20 @ 05:11)   Vancomycin Level, Trough: 36.3    Procalcitonin, Serum (10.15.20 @ 11:48)   Procalcitonin, Serum: 0.16: Procalcitonin (PCT) Interpretation (ng/mL) - Diagnosis of systemic   bacterial infection/sepsis         MEDICATIONS  (STANDING):    albumin human 25% IVPB 50 milliLiter(s) IV Intermittent every 8 hours  chlorhexidine 0.12% Liquid 15 milliLiter(s) Oral Mucosa every 12 hours  chlorhexidine 2% Cloths 1 Application(s) Topical daily  furosemide   Injectable 40 milliGRAM(s) IV Push every 8 hours  lactulose Syrup 30 Gram(s) Oral every 6 hours  levoFLOXacin IVPB 750 milliGRAM(s) IV Intermittent every 24 hours  levoFLOXacin IVPB      midodrine. 10 milliGRAM(s) Oral three times a day  norepinephrine Infusion 0.05 MICROgram(s)/kG/Min (2.63 mL/Hr) IV Continuous <Continuous>  nystatin Powder 1 Application(s) Topical two times a day  pantoprazole  Injectable 40 milliGRAM(s) IV Push two times a day  polyethylene glycol 3350 17 Gram(s) Oral daily  rifAXIMin 550 milliGRAM(s) Oral two times a day  thiamine IVPB 200 milliGRAM(s) IV Intermittent three times a day        RADIOLOGY & ADDITIONAL TESTS:    10/25/20: Xray Chest 1 View- PORTABLE-Routine (Xray Chest 1 View- PORTABLE-Routine in AM.) (10.25.20 @ 09:21) Frontal expiratory view of the chest shows the heart to be similar in size. Endotracheal tube, right jugular line and feeding tube remain present.    The lungs show similar left upper lobe infiltrate with progression of right perihilar infiltrate. Pleural effusions are similar. There is no evidence of pneumothorax.      10/23/20 : Xray Chest 1 View-PORTABLE IMMEDIATE (Xray Chest 1 View-PORTABLE IMMEDIATE .) (10.23.20 @ 19:09) Feeding tube tip near GE junction as noted. Questionable right upper lobe infiltrate. Follow-up study is recommended as clinically warranted.      10/21/20 : CT Abdomen and Pelvis w/ Oral Cont and w/ IV Cont (10.21.20 @ 15:59) Mural thickening of the left colon and rectum. Liquid stool in the colon. Apparent segmental mural thickening of the mid to distal small bowel and the proximal duodenum. Findings may represent nonspecific enterocolitis, noninfectious inflammatory bowel disease, or ischemic bowel. Clinical correlation is recommended. The celiac axis artery, SMA, and KINA are patent without stenosis.    Dilatation of the mid small bowel is again noted. Oral contrast has reached the terminal ileum. Findings may represent small bowel obstruction, or ileus related to nonspecific enterocolitis.   Cholelithiasis.    Moderate to large ascites in the abdomen, increased since the previous examination. 5 mm nonspecific noncalcified left upper lobe lung nodule; if the patient's is in the high risk category (i.e. smoker), follow-up chest CT may be pursued in 12 months to ensure stability.    Combination of atelectasis and consolidation in the left lower lobe.    Possible 1.0 cm hypodense lesion in the left lobe of the thyroid. Thyroid ultrasound may be pursued for further evaluation.   Mild bilateral pleural effusions.    Aging determinate compression fracture at T5 vertebra.        10/13/20 : CT Abdomen and Pelvis w/ IV Cont (10.13.20 @ 18:50) Diffuse dilatation of small bowel loops without a clear transition is slightly increased, likely an ileus.  Decreased moderate ascites.    1.5 cm pancreatic versus peripancreatic soft tissue nodule    10/13/20 : Xray Chest 1 View- PORTABLE-Urgent (Xray Chest 1 View- PORTABLE-Urgent .) (10.13.20 @ 16:34) Clear lungs.          MICROBIOLOGY DATA:    Culture - Sputum . (10.26.20 @ 00:26)   - Ampicillin/Sulbactam: R <=8/4   - Cefazolin: R >16   - Ceftazidime: I 16   - Clindamycin: R >4   - Erythromycin: R >4   - Gentamicin: S <=1 Should not be used as monotherapy   - Levofloxacin: S <=0.5   - Linezolid: S 2   - Oxacillin: R >2   - Penicillin: R >8   - RIF- Rifampin: S <=1 Should not be used as monotherapy   - Tetra/Doxy: S <=1   - Trimethoprim/Sulfamethoxazole: S <=0.5/9.5   - Trimethoprim/Sulfamethoxazole: S <=0.5/9.5   - Vancomycin: S 1   Gram Stain:  Moderate polymorphonuclear leukocytes per low power field   Rare Squamous epithelial cells per low power field   Moderate Gram Positive Cocci in Clusters per oil power field   Moderate Yeast per oil power field   Specimen Source: .Sputum Sputum   Culture Results:  Moderate Methicillin resistant Staphylococcus aureus   Moderate Stenotrophomonas maltophilia   Normal Respiratory Erin present   Organism Identification: Methicillin resistant Staphylococcus aureus   Stenotrophomonas maltophilia   Organism: Methicillin resistant Staphylococcus aureus   Organism: Stenotrophomonas maltophilia       MRSA/MSSA PCR (10.21.20 @ 09:41)   MRSA PCR Result.: Detected:       Culture - Blood (10.20.20 @ 22:09)   Specimen Source: .Blood Blood   Culture Results:  No growth to date.     Culture - Blood (10.20.20 @ 22:09)   Specimen Source: .Blood Blood   Culture Results:   No growth to date.     Culture - Blood in AM (10.16.20 @ 10:13)   Specimen Source: .Blood Blood-Peripheral   Culture Results:   No growth to date.     Culture - Blood in AM (10.16.20 @ 10:13)   Specimen Source: .Blood Blood-Peripheral   Culture Results:   No growth to date.     Culture - Blood (10.13.20 @ 22:23)   Growth in anaerobic bottle: Gram Negative Rods   Specimen Source: .Blood Blood-Peripheral   Organism: Blood Culture PCR   Culture Results:   Growth in anaerobic bottle: Bacteroides fragilis   "Susceptibilities not performed"     Culture - Blood (10.13.20 @ 22:23)   Specimen Source: .Blood Blood-Peripheral   Culture Results:   No growth to date.     Urine Microscopic-Add On (NC) (10.13.20 @ 21:39)   Bacteria: Moderate /HPF   Epithelial Cells: Moderate /HPF   Red Blood Cell - Urine: 5-10 /HPF   White Blood Cell - Urine: 6-10 /HPF

## 2020-10-29 NOTE — PROGRESS NOTE ADULT - SUBJECTIVE AND OBJECTIVE BOX
Chief Complaint:  Patient is a 65y old  Female who presents with a chief complaint of Hypotension (29 Oct 2020 09:47)      Reason for consult:    Interval Events: Patient was seen and examined at bedside. Remains intubated on pressor.     Hospital Medications:  albumin human 25% IVPB 50 milliLiter(s) IV Intermittent every 8 hours  chlorhexidine 0.12% Liquid 15 milliLiter(s) Oral Mucosa every 12 hours  chlorhexidine 2% Cloths 1 Application(s) Topical daily  furosemide   Injectable 40 milliGRAM(s) IV Push every 8 hours  lactulose Syrup 30 Gram(s) Oral every 6 hours  levoFLOXacin IVPB 750 milliGRAM(s) IV Intermittent every 24 hours  levoFLOXacin IVPB      midodrine. 10 milliGRAM(s) Oral three times a day  norepinephrine Infusion 0.05 MICROgram(s)/kG/Min IV Continuous <Continuous>  nystatin Powder 1 Application(s) Topical two times a day  pantoprazole  Injectable 40 milliGRAM(s) IV Push two times a day  polyethylene glycol 3350 17 Gram(s) Oral daily  rifAXIMin 550 milliGRAM(s) Oral two times a day  sodium chloride 0.9% lock flush 10 milliLiter(s) IV Push every 1 hour PRN  thiamine IVPB 200 milliGRAM(s) IV Intermittent three times a day      ROS:   Unable to obtain due to patient condition    PHYSICAL EXAM:   Vital Signs:  Vital Signs Last 24 Hrs  T(C): 36.6 (29 Oct 2020 12:00), Max: 37.3 (29 Oct 2020 05:30)  T(F): 97.9 (29 Oct 2020 12:00), Max: 99.1 (29 Oct 2020 05:30)  HR: 109 (29 Oct 2020 12:35) (106 - 118)  BP: 103/80 (29 Oct 2020 12:15) (98/66 - 123/94)  BP(mean): 85 (29 Oct 2020 12:15) (75 - 107)  RR: 23 (29 Oct 2020 12:15) (14 - 30)  SpO2: 100% (29 Oct 2020 12:35) (100% - 100%)  Daily     Daily Weight in k.8 (29 Oct 2020 07:15)    GENERAL: Critical  NEURO: was on sedation, was stopped today am  HEENT: icteric sclera  CHEST: intubated, on vent, b/l air entry  CARDIAC: regular rate, rhythm  ABDOMEN: soft, non-tender, non-distended, no rebound or guarding, BS+  EXTREMITIES: no edema  SKIN: no rash    LABS: reviewed                        8.9    8.82  )-----------(        ( 29 Oct 2020 05:11 )             26.3     10-29    142  |  114<H>  |  9   ----------------------------<  112<H>  3.5   |  22  |  1.36<H>    Ca    7.8<L>      29 Oct 2020 05:11  Phos  2.0     10-  Mg     1.9     10-28    TPro  4.4<L>  /  Alb  2.3<L>  /  TBili  5.5<H>  /  DBili  x   /  AST  104<H>  /  ALT  67<H>  /  AlkPhos  113  10-29    LIVER FUNCTIONS - ( 29 Oct 2020 05:11 )  Alb: 2.3 g/dL / Pro: 4.4 g/dL / ALK PHOS: 113 U/L / ALT: 67 U/L DA / AST: 104 U/L / GGT: x             Interval Diagnostic Studies: see sunrise for full report

## 2020-10-30 NOTE — PROGRESS NOTE ADULT - PROBLEM SELECTOR PLAN 1
2/2 shock/bacteremia; involving lungs (patient intubated 10/24); heart (echo shows EF 15-20%); liver (worsening LFTs, bili 6); kidneys (poor urine output). Patient remains sedated/intubated; on pressor. On antibiotics for bacteroides bacteremia, likely gi source. 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 Poor prognosis. Daughter/Jus is surrogate; DNR on file.

## 2020-10-30 NOTE — PROGRESS NOTE ADULT - ASSESSMENT
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  BC grew bacteroides.    Problem/Recommendation - 1:  Problem: Bacteremia. Recommendation: bacteroides  most likely GI origin  on antibiotics  he has hypothermia and hypotension and elevated lactic acid  in ICU now  on  pressor  check cortisol level to r/o adrenal insuff  multiorgan failure  Problem/Recommendation - 2:  ·  Problem: Anemia.  Recommendation: ferritin, b12 folate normal  no hemolysis  most likley due to malnutrition from short bowel syndrome.she also had GIB multiple times before.   Problem/Recommendation - 3:  ·  Problem: Acute deep vein thrombosis (DVT) of other vein of upper extremity.  Recommendation: she has been on xarelto for 2 years.  DVT at left arm and left leg before.  in all CT scan i did not see report of IVC or hepatic vein thrombosis  off xarelto and lovenox due to elevated PT/PTT4. elevated PT/PTT due to malnutrition and antibiotics causing VITK def  now it is mostly recovered  it begins to rise again, correctable by mixing study  probably due to liver failure this time  she develops DVT very quickly while off AC  need DVT prophylaxis, sq heparin  5. thrombocytopenia  new onset, most likely from sepsis or medication, procal is rising  the other possibilties causing multiorgan failure, such as autoimmune, vasculitis, HLH, catastrophic APS(less likely)  6. CHF, new onset global hypokinesia,   cardiomyopathy, ?etiology  will give thiamin

## 2020-10-30 NOTE — PROGRESS NOTE ADULT - ASSESSMENT
65y Female from home, lives with daughter, ambulates with walker with PMH of recurrent SBO's, s/p exp lap, SB resection in 2015, ex lap, ALBINA in 2018, DVT, PE, on Xarelto, IVC filter, chronic leg swelling, and anasarca, came in to the ED due to hypotension during office visit. Patient was found to be bacteremic with bacteroids fragilis. Admitted in ICU due to hypotension and hypothermia. On vancomycin and levo . BCx X2 negative. Sputum positive for MRSA and Stenotrophomonas.     Diagnosis:  >Encephalopathy  >Sepsis  >Bacteremia  >Anemia  >DVT/PE  >Transaminitis  >Aspiration pneumonia vs HCAP    --------------------------------------------Neuro--------------------------------------  -She is AAOx2 at baseline on evaluation.  -Encephalopathic and got intubated on 10/24  -Ammonia trending down from 152 > 130 > 116 >103>131>126>90  -On lactulose 30g q 6h and rifaximin  -Carl aguedagger for hypothermia, temp 96-97 in last 24 hr  -INR> 1.63>1.71>1.91      -------------------------------------CVS-------------------------------  -Patient is hypotensive and hypothermic secondary to sepsis.  -On abx Levo, vanc stopped for high vanc trough of 36  - NG tube feeding   -Midodrine 10 mg TID  -RIJ for pressors on levophed  -Hold Lasix home med in the setting of hypotension  -Pt is in fluid overload with decrease urine output today as well with positive net, will give Lasix for fluid overload and albumin to maintain the BP q8h  -previous ECHO 3-4 months back showed normal EF but new ECHO showed EF 15-20% with severe left ventricular dysfunction  -pt with multiorgan dysfunction could be sec to septic shock or could be autoimmune,   -f/u mixed venous gas from [facility] for perfusion, f/u on autoimmune workup KATHY, anticardiolipin abs , ANCA    --------------------------------Respiratory----------------------  -Intubated, not on any sedation  -CT showed mild b/l pleural effusion and left lower lobe consolidation in ICU on day 2  -HCAP vs aspiration pneumonia  - sputum culture grew MRSA and Stenotrophomonas (sensitive to levo)  -On levo and held vanc due to high trough  -ID Dr. Patricio    ----------------------------------------Gastrointestinal--------------------------------------  -Patient was bacteremic with bacteroids fragilis, likely secondary from intra abdominal source.  -Patient had multiple SBO in past. Ct abdomen on admission showed ileus, Repeat CT A/P showed ileus and non occlusive ischemic colitis  -No signs of acute abdomen   -Surgery recs > No intervention, pt was admitted in Jan diagnosed with budd Chiari syndrome with portal HTN recs to transfer to [facility] but pt is not stable to transfer  -LFTs are mildly elevated since admission, trending down. Likely due to sepsis vs hepatic failure sec to budd Chiari  -BCx X 2 negative.  -ammonia trends down from 150 to 130 >116>103>131>126>90, on lactulose and rifaximin   -had 1BM this am, 2 BM ytd   -monitor BM  -GI, DR Chatterjee deferred doing colonoscopy due to multiple comorbidities, FOBT positive on 10/15  -Dr Potts recs has been noted    -----------------------------------------ID---------------------------------------  -Patient was bacteremic with bacteroids likely GI source.  -Lactate is normal 1.3 but trends up to 2.3  -f/u lactate    -Procalcitonin 0.79  -On levo and held vanc due to high trough of 36, will check vanc trough in am   - BP on the lower side, pt is on Levophed baby dose and midodrine 10mg TID  -Monitor vitals Q4      ---------------------------------------------Nephro-------------------------------------  -Kidney function is normal, Cl is 120 with  sodium trends down today 147>150.152>152>151>148>147>142  -TF  discontinue free water 300q4  -Monitor UO    ----------------------------------------Hem Onc----------------------------------  -Has h/o DVt and PE in past. Patient is on Xarelto at home  -Patient has anemia  -anemia of chronic disease  -Hgb 6.9 1PRBC >8.7 >7 1PRBC> 8.5>1 PRBC> 8.5>9.7>9.6>10> 9.5>8.9  -Plat trends down [id number], no active bleeding  -Hold AC for now, will closely monitor the patient for any bleed  -Folate and B12 normal.     ----------------------------------------Endo--------------------------------  -HgbA1c 4    ----------------------------------------Prophylaxis----------------------------  DVT: hold Lovenox for low platelets'  GI: PPI    ---------------------------------------GOC---------------------------  DNR

## 2020-10-30 NOTE — PROGRESS NOTE ADULT - SUBJECTIVE AND OBJECTIVE BOX
INTERVAL HPI/OVERNIGHT EVENTS: ***    PRESSORS: [ ] YES [ ] NO  WHICH:    ANTIBIOTICS:                      Antimicrobial:  levoFLOXacin IVPB 750 milliGRAM(s) IV Intermittent every 24 hours  levoFLOXacin IVPB      rifAXIMin 550 milliGRAM(s) Oral two times a day    Cardiovascular:  midodrine. 10 milliGRAM(s) Oral three times a day  norepinephrine Infusion 0.05 MICROgram(s)/kG/Min IV Continuous <Continuous>    Pulmonary:    Hematalogic:    Other:  chlorhexidine 0.12% Liquid 15 milliLiter(s) Oral Mucosa every 12 hours  chlorhexidine 2% Cloths 1 Application(s) Topical daily  fentaNYL   Infusion. 1 MICROgram(s)/kG/Hr IV Continuous <Continuous>  lactulose Syrup 30 Gram(s) Oral every 6 hours  nystatin Powder 1 Application(s) Topical two times a day  pantoprazole  Injectable 40 milliGRAM(s) IV Push two times a day  polyethylene glycol 3350 17 Gram(s) Oral daily  sodium chloride 0.9% lock flush 10 milliLiter(s) IV Push every 1 hour PRN  thiamine IVPB 200 milliGRAM(s) IV Intermittent three times a day    chlorhexidine 0.12% Liquid 15 milliLiter(s) Oral Mucosa every 12 hours  chlorhexidine 2% Cloths 1 Application(s) Topical daily  fentaNYL   Infusion. 1 MICROgram(s)/kG/Hr IV Continuous <Continuous>  lactulose Syrup 30 Gram(s) Oral every 6 hours  levoFLOXacin IVPB 750 milliGRAM(s) IV Intermittent every 24 hours  levoFLOXacin IVPB      midodrine. 10 milliGRAM(s) Oral three times a day  norepinephrine Infusion 0.05 MICROgram(s)/kG/Min IV Continuous <Continuous>  nystatin Powder 1 Application(s) Topical two times a day  pantoprazole  Injectable 40 milliGRAM(s) IV Push two times a day  polyethylene glycol 3350 17 Gram(s) Oral daily  rifAXIMin 550 milliGRAM(s) Oral two times a day  sodium chloride 0.9% lock flush 10 milliLiter(s) IV Push every 1 hour PRN  thiamine IVPB 200 milliGRAM(s) IV Intermittent three times a day    Drug Dosing Weight  Height (cm): 167.6 (21 Oct 2020 02:26)  Weight (kg): 56.2 (21 Oct 2020 02:26)  BMI (kg/m2): 20 (21 Oct 2020 02:26)  BSA (m2): 1.63 (21 Oct 2020 02:26)    CENTRAL LINE: [ ] YES [ ] NO  LOCATION:   DATE INSERTED:  REMOVE: [ ] YES [ ] NO  EXPLAIN:    TREJO: [ ] YES [ ] NO    DATE INSERTED:  REMOVE:  [ ] YES [ ] NO  EXPLAIN:    A-LINE:  [ ] YES [ ] NO  LOCATION:   DATE INSERTED:  REMOVE:  [ ] YES [ ] NO  EXPLAIN:    PMH -reviewed admission note, no change since admission    ICU Vital Signs Last 24 Hrs  T(C): 37.1 (30 Oct 2020 04:00), Max: 37.1 (30 Oct 2020 04:00)  T(F): 98.8 (30 Oct 2020 04:00), Max: 98.8 (30 Oct 2020 04:00)  HR: 108 (30 Oct 2020 09:45) (73 - 131)  BP: 99/74 (30 Oct 2020 09:45) (95/73 - 114/79)  BP(mean): 80 (30 Oct 2020 09:45) (72 - 93)  ABP: --  ABP(mean): --  RR: 24 (30 Oct 2020 09:45) (18 - 36)  SpO2: 100% (30 Oct 2020 09:45) (85% - 100%)      ABG - ( 30 Oct 2020 04:08 )  pH, Arterial: 7.42  pH, Blood: x     /  pCO2: 35    /  pO2: 144   / HCO3: 22    / Base Excess: -1.3  /  SaO2: 98                    10-29 @ 07:01  -  10-30 @ 07:00  --------------------------------------------------------  IN: 2054.1 mL / OUT: 1705 mL / NET: 349.1 mL        Mode: AC/ CMV (Assist Control/ Continuous Mandatory Ventilation)  RR (machine): 24  TV (machine): 375  FiO2: 40  PEEP: 5  ITime: 1  MAP: 6.3  PIP: 11      PHYSICAL EXAM:    GENERAL: NAD, well-groomed, well-developed  HEAD:  Atraumatic, Normocephalic  EYES: EOMI, PERRLA, conjunctiva and sclera clear  ENMT: No tonsillar erythema, exudates, or enlargement; Moist mucous membranes, Good dentition, No lesions  NECK: Supple, normal appearance, No JVD; Normal thyroid; Trachea midline  NERVOUS SYSTEM:  Alert & Oriented X3, Good concentration; Motor Strength 5/5 B/L upper and lower extremities; DTRs 2+ intact and symmetric  CHEST/LUNG: No chest deformity; Normal percussion bilaterally; No rales, rhonchi, wheezing   HEART: Regular rate and rhythm; No murmurs, rubs, or gallops  ABDOMEN: Soft, Nontender, Nondistended; Bowel sounds present  EXTREMITIES:  2+ Peripheral Pulses, No clubbing, cyanosis, or edema  LYMPH: No lymphadenopathy noted  SKIN: No rashes or lesions; Good capillary refill      LABS:  CBC Full  -  ( 30 Oct 2020 06:30 )  WBC Count : 8.37 K/uL  RBC Count : 2.62 M/uL  Hemoglobin : 8.4 g/dL  Hematocrit : 24.9 %  Platelet Count - Automated : 32 K/uL  Mean Cell Volume : 95.0 fl  Mean Cell Hemoglobin : 32.1 pg  Mean Cell Hemoglobin Concentration : 33.7 gm/dL  Auto Neutrophil # : 6.90 K/uL  Auto Lymphocyte # : 0.70 K/uL  Auto Monocyte # : 0.62 K/uL  Auto Eosinophil # : 0.06 K/uL  Auto Basophil # : 0.02 K/uL  Auto Neutrophil % : 82.5 %  Auto Lymphocyte % : 8.4 %  Auto Monocyte % : 7.4 %  Auto Eosinophil % : 0.7 %  Auto Basophil % : 0.2 %    10-30    146<H>  |  115<H>  |  10  ----------------------------<  114<H>  3.3<L>   |  23  |  1.56<H>    Ca    8.0<L>      30 Oct 2020 06:30  Phos  1.5     10-30  Mg     1.8     10-30    TPro  4.5<L>  /  Alb  2.6<L>  /  TBili  6.8<H>  /  DBili  4.0<H>  /  AST  124<H>  /  ALT  63<H>  /  AlkPhos  237<H>  10-30    PT/INR - ( 29 Oct 2020 05:11 )   PT: 22.8 sec;   INR: 1.98 ratio         PTT - ( 29 Oct 2020 05:11 )  PTT:56.5 sec        RADIOLOGY & ADDITIONAL STUDIES REVIEWED:  ***    GOALS OF CARE DISCUSSION WITH PATIENT/FAMILY/PROXY:    CRITICAL CARE TIME SPENT: 35 minutes INTERVAL HPI/OVERNIGHT EVENTS: Overnight pt was desaturating and was breathing over the vent.     PRESSORS: [ ] YES [ ] NO  WHICH:    ANTIBIOTICS:                      Antimicrobial:  levoFLOXacin IVPB 750 milliGRAM(s) IV Intermittent every 24 hours  levoFLOXacin IVPB      rifAXIMin 550 milliGRAM(s) Oral two times a day    Cardiovascular:  midodrine. 10 milliGRAM(s) Oral three times a day  norepinephrine Infusion 0.05 MICROgram(s)/kG/Min IV Continuous <Continuous>    Pulmonary:    Hematalogic:    Other:  chlorhexidine 0.12% Liquid 15 milliLiter(s) Oral Mucosa every 12 hours  chlorhexidine 2% Cloths 1 Application(s) Topical daily  fentaNYL   Infusion. 1 MICROgram(s)/kG/Hr IV Continuous <Continuous>  lactulose Syrup 30 Gram(s) Oral every 6 hours  nystatin Powder 1 Application(s) Topical two times a day  pantoprazole  Injectable 40 milliGRAM(s) IV Push two times a day  polyethylene glycol 3350 17 Gram(s) Oral daily  sodium chloride 0.9% lock flush 10 milliLiter(s) IV Push every 1 hour PRN  thiamine IVPB 200 milliGRAM(s) IV Intermittent three times a day    chlorhexidine 0.12% Liquid 15 milliLiter(s) Oral Mucosa every 12 hours  chlorhexidine 2% Cloths 1 Application(s) Topical daily  fentaNYL   Infusion. 1 MICROgram(s)/kG/Hr IV Continuous <Continuous>  lactulose Syrup 30 Gram(s) Oral every 6 hours  levoFLOXacin IVPB 750 milliGRAM(s) IV Intermittent every 24 hours  levoFLOXacin IVPB      midodrine. 10 milliGRAM(s) Oral three times a day  norepinephrine Infusion 0.05 MICROgram(s)/kG/Min IV Continuous <Continuous>  nystatin Powder 1 Application(s) Topical two times a day  pantoprazole  Injectable 40 milliGRAM(s) IV Push two times a day  polyethylene glycol 3350 17 Gram(s) Oral daily  rifAXIMin 550 milliGRAM(s) Oral two times a day  sodium chloride 0.9% lock flush 10 milliLiter(s) IV Push every 1 hour PRN  thiamine IVPB 200 milliGRAM(s) IV Intermittent three times a day    Drug Dosing Weight  Height (cm): 167.6 (21 Oct 2020 02:26)  Weight (kg): 56.2 (21 Oct 2020 02:26)  BMI (kg/m2): 20 (21 Oct 2020 02:26)  BSA (m2): 1.63 (21 Oct 2020 02:26)    CENTRAL LINE: [ ] YES [ ] NO  LOCATION:   DATE INSERTED:  REMOVE: [ ] YES [ ] NO  EXPLAIN:    TREJO: [ ] YES [ ] NO    DATE INSERTED:  REMOVE:  [ ] YES [ ] NO  EXPLAIN:    A-LINE:  [ ] YES [ ] NO  LOCATION:   DATE INSERTED:  REMOVE:  [ ] YES [ ] NO  EXPLAIN:    PMH -reviewed admission note, no change since admission    ICU Vital Signs Last 24 Hrs  T(C): 37.1 (30 Oct 2020 04:00), Max: 37.1 (30 Oct 2020 04:00)  T(F): 98.8 (30 Oct 2020 04:00), Max: 98.8 (30 Oct 2020 04:00)  HR: 108 (30 Oct 2020 09:45) (73 - 131)  BP: 99/74 (30 Oct 2020 09:45) (95/73 - 114/79)  BP(mean): 80 (30 Oct 2020 09:45) (72 - 93)  ABP: --  ABP(mean): --  RR: 24 (30 Oct 2020 09:45) (18 - 36)  SpO2: 100% (30 Oct 2020 09:45) (85% - 100%)      ABG - ( 30 Oct 2020 04:08 )  pH, Arterial: 7.42  pH, Blood: x     /  pCO2: 35    /  pO2: 144   / HCO3: 22    / Base Excess: -1.3  /  SaO2: 98                    10-29 @ 07:01  -  10-30 @ 07:00  --------------------------------------------------------  IN: 2054.1 mL / OUT: 1705 mL / NET: 349.1 mL        Mode: AC/ CMV (Assist Control/ Continuous Mandatory Ventilation)  RR (machine): 24  TV (machine): 375  FiO2: 40  PEEP: 5  ITime: 1  MAP: 6.3  PIP: 11      PHYSICAL EXAM:    GENERAL: NAD, well-groomed, well-developed  HEAD:  Atraumatic, Normocephalic  EYES: EOMI, PERRLA, conjunctiva and sclera clear  ENMT: No tonsillar erythema, exudates, or enlargement; Moist mucous membranes, Good dentition, No lesions  NECK: Supple, normal appearance, No JVD; Normal thyroid; Trachea midline  NERVOUS SYSTEM:  Alert & Oriented X3, Good concentration; Motor Strength 5/5 B/L upper and lower extremities; DTRs 2+ intact and symmetric  CHEST/LUNG: No chest deformity; Normal percussion bilaterally; No rales, rhonchi, wheezing   HEART: Regular rate and rhythm; No murmurs, rubs, or gallops  ABDOMEN: Soft, Nontender, Nondistended; Bowel sounds present  EXTREMITIES:  2+ Peripheral Pulses, No clubbing, cyanosis, or edema  LYMPH: No lymphadenopathy noted  SKIN: No rashes or lesions; Good capillary refill      LABS:  CBC Full  -  ( 30 Oct 2020 06:30 )  WBC Count : 8.37 K/uL  RBC Count : 2.62 M/uL  Hemoglobin : 8.4 g/dL  Hematocrit : 24.9 %  Platelet Count - Automated : 32 K/uL  Mean Cell Volume : 95.0 fl  Mean Cell Hemoglobin : 32.1 pg  Mean Cell Hemoglobin Concentration : 33.7 gm/dL  Auto Neutrophil # : 6.90 K/uL  Auto Lymphocyte # : 0.70 K/uL  Auto Monocyte # : 0.62 K/uL  Auto Eosinophil # : 0.06 K/uL  Auto Basophil # : 0.02 K/uL  Auto Neutrophil % : 82.5 %  Auto Lymphocyte % : 8.4 %  Auto Monocyte % : 7.4 %  Auto Eosinophil % : 0.7 %  Auto Basophil % : 0.2 %    10-30    146<H>  |  115<H>  |  10  ----------------------------<  114<H>  3.3<L>   |  23  |  1.56<H>    Ca    8.0<L>      30 Oct 2020 06:30  Phos  1.5     10-30  Mg     1.8     10-30    TPro  4.5<L>  /  Alb  2.6<L>  /  TBili  6.8<H>  /  DBili  4.0<H>  /  AST  124<H>  /  ALT  63<H>  /  AlkPhos  237<H>  10-30    PT/INR - ( 29 Oct 2020 05:11 )   PT: 22.8 sec;   INR: 1.98 ratio         PTT - ( 29 Oct 2020 05:11 )  PTT:56.5 sec        RADIOLOGY & ADDITIONAL STUDIES REVIEWED:  ***    GOALS OF CARE DISCUSSION WITH PATIENT/FAMILY/PROXY:    CRITICAL CARE TIME SPENT: 35 minutes INTERVAL HPI/OVERNIGHT EVENTS: Overnight Pt was desaturating and was breathing over the vent. She was started on fentanyl 1 mcg.         ANTIBIOTICS:                      Antimicrobial:  levoFLOXacin IVPB 750 milliGRAM(s) IV Intermittent every 24 hours  levoFLOXacin IVPB      rifAXIMin 550 milliGRAM(s) Oral two times a day    Cardiovascular:  midodrine. 10 milliGRAM(s) Oral three times a day  norepinephrine Infusion 0.05 MICROgram(s)/kG/Min IV Continuous <Continuous>    Pulmonary:    Hematalogic:    Other:  chlorhexidine 0.12% Liquid 15 milliLiter(s) Oral Mucosa every 12 hours  chlorhexidine 2% Cloths 1 Application(s) Topical daily  fentaNYL   Infusion. 1 MICROgram(s)/kG/Hr IV Continuous <Continuous>  lactulose Syrup 30 Gram(s) Oral every 6 hours  nystatin Powder 1 Application(s) Topical two times a day  pantoprazole  Injectable 40 milliGRAM(s) IV Push two times a day  polyethylene glycol 3350 17 Gram(s) Oral daily  sodium chloride 0.9% lock flush 10 milliLiter(s) IV Push every 1 hour PRN  thiamine IVPB 200 milliGRAM(s) IV Intermittent three times a day    chlorhexidine 0.12% Liquid 15 milliLiter(s) Oral Mucosa every 12 hours  chlorhexidine 2% Cloths 1 Application(s) Topical daily  fentaNYL   Infusion. 1 MICROgram(s)/kG/Hr IV Continuous <Continuous>  lactulose Syrup 30 Gram(s) Oral every 6 hours  levoFLOXacin IVPB 750 milliGRAM(s) IV Intermittent every 24 hours  levoFLOXacin IVPB      midodrine. 10 milliGRAM(s) Oral three times a day  norepinephrine Infusion 0.05 MICROgram(s)/kG/Min IV Continuous <Continuous>  nystatin Powder 1 Application(s) Topical two times a day  pantoprazole  Injectable 40 milliGRAM(s) IV Push two times a day  polyethylene glycol 3350 17 Gram(s) Oral daily  rifAXIMin 550 milliGRAM(s) Oral two times a day  sodium chloride 0.9% lock flush 10 milliLiter(s) IV Push every 1 hour PRN  thiamine IVPB 200 milliGRAM(s) IV Intermittent three times a day    Drug Dosing Weight  Height (cm): 167.6 (21 Oct 2020 02:26)  Weight (kg): 56.2 (21 Oct 2020 02:26)  BMI (kg/m2): 20 (21 Oct 2020 02:26)  BSA (m2): 1.63 (21 Oct 2020 02:26)    CENTRAL LINE: [ ] YES [ ] NO  LOCATION:   DATE INSERTED:  REMOVE: [ ] YES [ ] NO  EXPLAIN:    TREJO: [ ] YES [ ] NO    DATE INSERTED:  REMOVE:  [ ] YES [ ] NO  EXPLAIN:    A-LINE:  [ ] YES [ ] NO  LOCATION:   DATE INSERTED:  REMOVE:  [ ] YES [ ] NO  EXPLAIN:    PMH -reviewed admission note, no change since admission    ICU Vital Signs Last 24 Hrs  T(C): 37.1 (30 Oct 2020 04:00), Max: 37.1 (30 Oct 2020 04:00)  T(F): 98.8 (30 Oct 2020 04:00), Max: 98.8 (30 Oct 2020 04:00)  HR: 108 (30 Oct 2020 09:45) (73 - 131)  BP: 99/74 (30 Oct 2020 09:45) (95/73 - 114/79)  BP(mean): 80 (30 Oct 2020 09:45) (72 - 93)  ABP: --  ABP(mean): --  RR: 24 (30 Oct 2020 09:45) (18 - 36)  SpO2: 100% (30 Oct 2020 09:45) (85% - 100%)      ABG - ( 30 Oct 2020 04:08 )  pH, Arterial: 7.42  pH, Blood: x     /  pCO2: 35    /  pO2: 144   / HCO3: 22    / Base Excess: -1.3  /  SaO2: 98                    10-29 @ 07:01  -  10-30 @ 07:00  --------------------------------------------------------  IN: 2054.1 mL / OUT: 1705 mL / NET: 349.1 mL        Mode: AC/ CMV (Assist Control/ Continuous Mandatory Ventilation)  RR (machine): 24  TV (machine): 375  FiO2: 40  PEEP: 5  ITime: 1  MAP: 6.3  PIP: 11      PHYSICAL EXAM:    GENERAL: NAD, well-groomed, well-developed  HEAD:  Atraumatic, Normocephalic  EYES: EOMI, PERRLA, conjunctiva and sclera clear  ENMT: No tonsillar erythema, exudates, or enlargement; Moist mucous membranes, Good dentition, No lesions  NECK: Supple, normal appearance, No JVD; Normal thyroid; Trachea midline  NERVOUS SYSTEM:  Alert & Oriented X3, Good concentration; Motor Strength 5/5 B/L upper and lower extremities; DTRs 2+ intact and symmetric  CHEST/LUNG: No chest deformity; Normal percussion bilaterally; No rales, rhonchi, wheezing   HEART: Regular rate and rhythm; No murmurs, rubs, or gallops  ABDOMEN: Soft, Nontender, Nondistended; Bowel sounds present  EXTREMITIES:  2+ Peripheral Pulses, No clubbing, cyanosis, or edema  LYMPH: No lymphadenopathy noted  SKIN: No rashes or lesions; Good capillary refill      LABS:  CBC Full  -  ( 30 Oct 2020 06:30 )  WBC Count : 8.37 K/uL  RBC Count : 2.62 M/uL  Hemoglobin : 8.4 g/dL  Hematocrit : 24.9 %  Platelet Count - Automated : 32 K/uL  Mean Cell Volume : 95.0 fl  Mean Cell Hemoglobin : 32.1 pg  Mean Cell Hemoglobin Concentration : 33.7 gm/dL  Auto Neutrophil # : 6.90 K/uL  Auto Lymphocyte # : 0.70 K/uL  Auto Monocyte # : 0.62 K/uL  Auto Eosinophil # : 0.06 K/uL  Auto Basophil # : 0.02 K/uL  Auto Neutrophil % : 82.5 %  Auto Lymphocyte % : 8.4 %  Auto Monocyte % : 7.4 %  Auto Eosinophil % : 0.7 %  Auto Basophil % : 0.2 %    10-30    146<H>  |  115<H>  |  10  ----------------------------<  114<H>  3.3<L>   |  23  |  1.56<H>    Ca    8.0<L>      30 Oct 2020 06:30  Phos  1.5     10-30  Mg     1.8     10-30    TPro  4.5<L>  /  Alb  2.6<L>  /  TBili  6.8<H>  /  DBili  4.0<H>  /  AST  124<H>  /  ALT  63<H>  /  AlkPhos  237<H>  10-30    PT/INR - ( 29 Oct 2020 05:11 )   PT: 22.8 sec;   INR: 1.98 ratio         PTT - ( 29 Oct 2020 05:11 )  PTT:56.5 sec        RADIOLOGY & ADDITIONAL STUDIES REVIEWED:  ***    GOALS OF CARE DISCUSSION WITH PATIENT/FAMILY/PROXY:    CRITICAL CARE TIME SPENT: 35 minutes INTERVAL HPI/OVERNIGHT EVENTS: Overnight Pt was desaturating and was breathing over the vent. She was started on fentanyl 1 mcg. Pt is intubated and sedated , opening her eyes on calling name but not following commands. On suctioning secretions were yellow and thick in consistency.       ANTIBIOTICS:                      Antimicrobial:  levofloxacin IVPB 750 milliGRAM(s) IV Intermittent every 24 hours  levofloxacin IVPB      rifAXIMin 550 milliGRAM(s) Oral two times a day    Cardiovascular:  midodrine. 10 milliGRAM(s) Oral three times a day  norepinephrine Infusion 0.05 MICROgram(s)/kG/Min IV Continuous <Continuous>    Pulmonary:    Hematalogic:    Other:  chlorhexidine 0.12% Liquid 15 milliLiter(s) Oral Mucosa every 12 hours  chlorhexidine 2% Cloths 1 Application(s) Topical daily  fentaNYL   Infusion. 1 MICROgram(s)/kG/Hr IV Continuous <Continuous>  lactulose Syrup 30 Gram(s) Oral every 6 hours  nystatin Powder 1 Application(s) Topical two times a day  pantoprazole  Injectable 40 milliGRAM(s) IV Push two times a day  polyethylene glycol 3350 17 Gram(s) Oral daily  sodium chloride 0.9% lock flush 10 milliLiter(s) IV Push every 1 hour PRN  thiamine IVPB 200 milliGRAM(s) IV Intermittent three times a day    chlorhexidine 0.12% Liquid 15 milliLiter(s) Oral Mucosa every 12 hours  chlorhexidine 2% Cloths 1 Application(s) Topical daily  fentaNYL   Infusion. 1 MICROgram(s)/kG/Hr IV Continuous <Continuous>  lactulose Syrup 30 Gram(s) Oral every 6 hours  levoFLOXacin IVPB 750 milliGRAM(s) IV Intermittent every 24 hours  levoFLOXacin IVPB      midodrine. 10 milliGRAM(s) Oral three times a day  norepinephrine Infusion 0.05 MICROgram(s)/kG/Min IV Continuous <Continuous>  nystatin Powder 1 Application(s) Topical two times a day  pantoprazole  Injectable 40 milliGRAM(s) IV Push two times a day  polyethylene glycol 3350 17 Gram(s) Oral daily  rifAXIMin 550 milliGRAM(s) Oral two times a day  sodium chloride 0.9% lock flush 10 milliLiter(s) IV Push every 1 hour PRN  thiamine IVPB 200 milliGRAM(s) IV Intermittent three times a day    Drug Dosing Weight  Height (cm): 167.6 (21 Oct 2020 02:26)  Weight (kg): 56.2 (21 Oct 2020 02:26)  BMI (kg/m2): 20 (21 Oct 2020 02:26)  BSA (m2): 1.63 (21 Oct 2020 02:26)    CENTRAL LINE: [ ] YES [ ] NO  LOCATION:   DATE INSERTED:  REMOVE: [ ] YES [ ] NO  EXPLAIN:    TREJO: [ ] YES [ ] NO    DATE INSERTED:  REMOVE:  [ ] YES [ ] NO  EXPLAIN:    A-LINE:  [ ] YES [ ] NO  LOCATION:   DATE INSERTED:  REMOVE:  [ ] YES [ ] NO  EXPLAIN:    PMH -reviewed admission note, no change since admission    ICU Vital Signs Last 24 Hrs  T(C): 37.1 (30 Oct 2020 04:00), Max: 37.1 (30 Oct 2020 04:00)  T(F): 98.8 (30 Oct 2020 04:00), Max: 98.8 (30 Oct 2020 04:00)  HR: 108 (30 Oct 2020 09:45) (73 - 131)  BP: 99/74 (30 Oct 2020 09:45) (95/73 - 114/79)  BP(mean): 80 (30 Oct 2020 09:45) (72 - 93)  ABP: --  ABP(mean): --  RR: 24 (30 Oct 2020 09:45) (18 - 36)  SpO2: 100% (30 Oct 2020 09:45) (85% - 100%)      ABG - ( 30 Oct 2020 04:08 )  pH, Arterial: 7.42  pH, Blood: x     /  pCO2: 35    /  pO2: 144   / HCO3: 22    / Base Excess: -1.3  /  SaO2: 98                    10-29 @ 07:01  -  10-30 @ 07:00  --------------------------------------------------------  IN: 2054.1 mL / OUT: 1705 mL / NET: 349.1 mL        Mode: AC/ CMV (Assist Control/ Continuous Mandatory Ventilation)  RR (machine): 24  TV (machine): 375  FiO2: 40  PEEP: 5  ITime: 1  MAP: 6.3  PIP: 11      PHYSICAL EXAM:    GENERAL: Intubated and sedated  HEAD:  Atraumatic, Normocephalic  EYES: b/l pupil reactive to light.   NECK: Supple, normal appearance, No JVD; Normal thyroid; Trachea midline  NERVOUS SYSTEM:  Alert & Oriented X0  CHEST/LUNG: equal b/l air entry and rhonchi on auscultation  HEART: Regular rate and rhythm; No murmurs, rubs, or gallops  ABDOMEN: Soft, Nontender, mildly distended; Bowel sounds present  EXTREMITIES:  2+ Peripheral Pulses, No clubbing, cyanosis, or edema  LYMPH: No lymphadenopathy noted  SKIN: No rashes or lesions; Good capillary refill      LABS:  CBC Full  -  ( 30 Oct 2020 06:30 )  WBC Count : 8.37 K/uL  RBC Count : 2.62 M/uL  Hemoglobin : 8.4 g/dL  Hematocrit : 24.9 %  Platelet Count - Automated : 32 K/uL  Mean Cell Volume : 95.0 fl  Mean Cell Hemoglobin : 32.1 pg  Mean Cell Hemoglobin Concentration : 33.7 gm/dL  Auto Neutrophil # : 6.90 K/uL  Auto Lymphocyte # : 0.70 K/uL  Auto Monocyte # : 0.62 K/uL  Auto Eosinophil # : 0.06 K/uL  Auto Basophil # : 0.02 K/uL  Auto Neutrophil % : 82.5 %  Auto Lymphocyte % : 8.4 %  Auto Monocyte % : 7.4 %  Auto Eosinophil % : 0.7 %  Auto Basophil % : 0.2 %    10-30    146<H>  |  115<H>  |  10  ----------------------------<  114<H>  3.3<L>   |  23  |  1.56<H>    Ca    8.0<L>      30 Oct 2020 06:30  Phos  1.5     10-30  Mg     1.8     10-30    TPro  4.5<L>  /  Alb  2.6<L>  /  TBili  6.8<H>  /  DBili  4.0<H>  /  AST  124<H>  /  ALT  63<H>  /  AlkPhos  237<H>  10-30    PT/INR - ( 29 Oct 2020 05:11 )   PT: 22.8 sec;   INR: 1.98 ratio         PTT - ( 29 Oct 2020 05:11 )  PTT:56.5 sec        RADIOLOGY & ADDITIONAL STUDIES REVIEWED:      GOALS OF CARE DISCUSSION WITH PATIENT/FAMILY/PROXY:    CRITICAL CARE TIME SPENT: 35 minutes

## 2020-10-30 NOTE — PROGRESS NOTE ADULT - SUBJECTIVE AND OBJECTIVE BOX
condition same  on vent  no fever  on pressor     Pt is seen and examined  pt is awake and lying in bed/out of bed to chair  pt seems comfortable and denies any complaints at this time    ROS:  Negative except for:    MEDICATIONS  (STANDING):  chlorhexidine 0.12% Liquid 15 milliLiter(s) Oral Mucosa every 12 hours  chlorhexidine 2% Cloths 1 Application(s) Topical daily  fentaNYL   Infusion. 1 MICROgram(s)/kG/Hr (5.62 mL/Hr) IV Continuous <Continuous>  lactulose Syrup 30 Gram(s) Oral every 6 hours  levoFLOXacin IVPB 750 milliGRAM(s) IV Intermittent every 24 hours  levoFLOXacin IVPB      midodrine. 10 milliGRAM(s) Oral three times a day  norepinephrine Infusion 0.05 MICROgram(s)/kG/Min (2.63 mL/Hr) IV Continuous <Continuous>  nystatin Powder 1 Application(s) Topical two times a day  pantoprazole  Injectable 40 milliGRAM(s) IV Push two times a day  polyethylene glycol 3350 17 Gram(s) Oral daily  rifAXIMin 550 milliGRAM(s) Oral two times a day  thiamine IVPB 200 milliGRAM(s) IV Intermittent three times a day    MEDICATIONS  (PRN):  sodium chloride 0.9% lock flush 10 milliLiter(s) IV Push every 1 hour PRN Pre/post blood products, medications, blood draw, and to maintain line patency      Allergies    No Known Allergies    Intolerances        Vital Signs Last 24 Hrs  T(C): 37.1 (30 Oct 2020 04:00), Max: 37.1 (30 Oct 2020 04:00)  T(F): 98.8 (30 Oct 2020 04:00), Max: 98.8 (30 Oct 2020 04:00)  HR: 112 (30 Oct 2020 09:00) (73 - 131)  BP: 100/76 (30 Oct 2020 09:00) (95/73 - 114/79)  BP(mean): 80 (30 Oct 2020 09:00) (72 - 93)  RR: 25 (30 Oct 2020 09:00) (18 - 36)  SpO2: 100% (30 Oct 2020 09:00) (85% - 100%)    PHYSICAL EXAM  General: adult in NAD  HEENT: clear oropharynx, anicteric sclera, pink conjunctiva  Neck: supple  CV: normal S1/S2 with no murmur rubs or gallops  Lungs: positive air movement b/l ant lungs,clear to auscultation, no wheezes, no rales  Abdomen: soft non-tender non-distended, no hepatosplenomegaly  Ext: no clubbing cyanosis or edema  Skin: no rashes and no petechiae  Neuro: alert and oriented X 4, no focal deficits  LABS:                          8.4    8.37  )-----------( 32       ( 30 Oct 2020 06:30 )             24.9         Mean Cell Volume : 95.0 fl  Mean Cell Hemoglobin : 32.1 pg  Mean Cell Hemoglobin Concentration : 33.7 gm/dL  Auto Neutrophil # : 6.90 K/uL  Auto Lymphocyte # : 0.70 K/uL  Auto Monocyte # : 0.62 K/uL  Auto Eosinophil # : 0.06 K/uL  Auto Basophil # : 0.02 K/uL  Auto Neutrophil % : 82.5 %  Auto Lymphocyte % : 8.4 %  Auto Monocyte % : 7.4 %  Auto Eosinophil % : 0.7 %  Auto Basophil % : 0.2 %    Serial CBC  Hematocrit 24.9  Hemoglobin 8.4  Plat 32  RBC 2.62  WBC 8.37  Serial CBC  Hematocrit 26.3  Hemoglobin 8.9  Plat 27  RBC 2.80  WBC 8.82  Serial CBC  Hematocrit 27.9  Hemoglobin 9.5  Plat 29  RBC 2.99  WBC 9.40  Serial CBC  Hematocrit 27.6  Hemoglobin 9.5  Plat 32  RBC 2.96  WBC 7.07  Serial CBC  Hematocrit 29.9  Hemoglobin 10.1  Plat 43  RBC 3.21  WBC 6.80  Serial CBC  Hematocrit 29.6  Hemoglobin 10.1  Plat 45  RBC 3.16  WBC 5.96    10-30    146<H>  |  115<H>  |  10  ----------------------------<  114<H>  3.3<L>   |  23  |  1.56<H>    Ca    8.0<L>      30 Oct 2020 06:30  Phos  1.5     10-30  Mg     1.8     10-30    TPro  4.5<L>  /  Alb  2.6<L>  /  TBili  6.8<H>  /  DBili  4.0<H>  /  AST  124<H>  /  ALT  63<H>  /  AlkPhos  237<H>  10-30      PT/INR - ( 29 Oct 2020 05:11 )   PT: 22.8 sec;   INR: 1.98 ratio         PTT - ( 29 Oct 2020 05:11 )  PTT:56.5 sec              BLOOD SMEAR INTERPRETATION:       RADIOLOGY & ADDITIONAL STUDIES:

## 2020-10-30 NOTE — PROGRESS NOTE ADULT - ASSESSMENT
Patient is a 65y old  Female from home, lives with daughter, ambulates with walker with PMH of recurrent SBO's, s/p exp lap, SB resection in 2015, ex lap, ALBINA in 2018, DVT, PE, on Xarelto, IVC filter, chronic leg swelling, and anasarca, Now send in to the ER by her PCP, Dr. Ross, for evaluation of  generalized weakness and hypotension BP of 80/40 during office visit. On admission, she found to have no fever and BP was in lower normal range and no Leukocytosis. The CXR is clear and CT abd/pelvis consistent with Ileus. The ID consult requested to assist with evaluation of infectious etiology of episode of hypotension. Found to have Bacteroides bacteremia and now developed septic shock on Cefepime and Flagyl. Hence transferred to ICU. 10/20/20.    # Hypotension  at office- BP of 80/40 - resolved   #  Bacteroides fragilis Bacteremia - 10/13/20 - ? source most likely GI- Repeat Blood Cxs have NGTD 10/16/20  # Ileus  - h/o recurrent SBO and s/p EXp. LAp  # COVID 19 negative   # Septic shock ( Hypothermia + hypotensive)- transferred to ICU since requiring pressor- source GI, The CT abd/pelvis shows nonspecific enterocolitis, noninfectious inflammatory bowel disease, or ischemic bowel, along with ileus.   # Pneumonia- HCAP vs Aspiration - on CXR 10/24 and CT chest 10/21- sputum Cx grew Methicillin resistant Staphylococcus aureus  and Stenotrophomonas maltophilia.      would recommend:    1. Hold off Vancomycin until  level is less than 20  2. Continue Levaquin to cover Stenotrophomonas  3. Aspiration precaution  4. Monitor kidney function and adjust Abx doses accordingly   5. Management of Vent as per ICU protocol    d/w ICU team     Attending Attestation:    Spent more than 45 minutes on total encounter, more than 50 % of the visit was spent counseling and/or coordinating care by the Attending physician.

## 2020-10-30 NOTE — PROGRESS NOTE ADULT - PROBLEM SELECTOR PLAN 5
DNR on file. GOC discussion as above. Appropriate for no further escalation of care given poor overall prognosis. Daughter will discuss w family. Palliative will follow.

## 2020-10-30 NOTE — PROGRESS NOTE ADULT - PROBLEM SELECTOR PLAN 4
2/2 recurrent hospitalizations, s/p JOHNATHAN. Was ambulatory short distances at home. Now patient sedated/intubated, on pressor; dependent on ADLs, + skin failure.

## 2020-10-30 NOTE — PROGRESS NOTE ADULT - ASSESSMENT
65y Female from home, lives with daughter, ambulates with walker with PMH of recurrent SBO's, s/p exp lap, SB resection in 2015, ex lap, ALBINA in 2018, DVT, PE, on Xarelto, IVC filter, chronic leg swelling, and anasarca, came in to the ED due to hypotension during office visit. Patient was found to be bacteremic with bacteroids fragilis. Admitted in ICU due to hypotension and hypothermia. On vancomycin and levo . BCx X2 negative. Sputum positive for MRSA and Stenotrophomonas.     Diagnosis:  >Encephalopathy  >Sepsis  >Bacteremia  >Anemia  >DVT/PE  >Transaminitis  >Aspiration pneumonia vs HCAP    --------------------------------------------Neuro--------------------------------------  -She is AAOx2 at baseline on evaluation.  -Encephalopathic and got intubated on 10/24  -Ammonia trending down from 152 > 130 > 116 >103>131>126>90  -On lactulose 30g q 6h and rifaximin  -Carl aguedagger for hypothermia, temp 96-97 in last 24 hr  -INR> 1.63>1.71>1.91      -------------------------------------CVS-------------------------------  -Patient is hypotensive and hypothermic secondary to sepsis.  -On abx Levo, vanc stopped for high vanc trough of 36  - NG tube feeding   -Midodrine 10 mg TID  -RIJ for pressors on levophed  -Hold Lasix home med in the setting of hypotension  -Pt is in fluid overload with decrease urine output today as well with positive net, will give Lasix for fluid overload and albumin to maintain the BP q8h  -previous ECHO 3-4 months back showed normal EF but new ECHO showed EF 15-20% with severe left ventricular dysfunction  -pt with multiorgan dysfunction could be sec to septic shock or could be autoimmune,   -f/u mixed venous gas from [facility] for perfusion, f/u on autoimmune workup KATHY, anticardiolipin abs , ANCA    --------------------------------Respiratory----------------------  -Intubated, not on any sedation  -CT showed mild b/l pleural effusion and left lower lobe consolidation in ICU on day 2  -HCAP vs aspiration pneumonia  - sputum culture grew MRSA and Stenotrophomonas (sensitive to levo)  -On levo and held vanc due to high trough  -ID Dr. Patricio    ----------------------------------------Gastrointestinal--------------------------------------  -Patient was bacteremic with bacteroids fragilis, likely secondary from intra abdominal source.  -Patient had multiple SBO in past. 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 Likely due to sepsis vs hepatic failure sec to budd Chiari  -BCx X 2 negative.  -ammonia trends down from 150 to 130 >116>103>131>126>90, on lactulose and rifaximin   -had 1BM this am, 2 BM ytd   -monitor BM  -GI, DR Chatterjee deferred doing colonoscopy due to multiple comorbidities, FOBT positive on 10/15  -Dr Potts recs has been noted    -----------------------------------------ID---------------------------------------  -Patient was bacteremic with bacteroids likely GI source.  -Lactate is normal 1.3 but trends up to 2.3  -f/u lactate    -Procalcitonin 0.79  -On levo and held vanc due to high trough of 36, will check vanc trough in am   - BP on the lower side, pt is on Levophed baby dose and midodrine 10mg TID  -Monitor vitals Q4      ---------------------------------------------Nephro-------------------------------------  -Kidney function is normal, Cl is 120 with  sodium trends down today 147>150.152>152>151>148>147>142  -TF  discontinue free water 300q4  -Monitor UO    ----------------------------------------Hem Onc----------------------------------  -Has h/o DVt and PE in past. Patient is on Xarelto at home  -Patient has anemia  -anemia of chronic disease  -Hgb 6.9 1PRBC >8.7 >7 1PRBC> 8.5>1 PRBC> 8.5>9.7>9.6>10> 9.5>8.9  -Plat trends down [id number], no active bleeding  -Hold AC for now, will closely monitor the patient for any bleed  -Folate and B12 normal.     ----------------------------------------Endo--------------------------------  -HgbA1c 4    ----------------------------------------Prophylaxis----------------------------  DVT: hold Lovenox for low platelets'  GI: PPI    ---------------------------------------GOC---------------------------  DNR        65y Female from home, lives with daughter, ambulates with walker with PMH of recurrent SBO's, s/p exp lap, SB resection in 2015, ex lap, ALBINA in 2018, DVT, PE, on Xarelto, IVC filter, chronic leg swelling, and anasarca, came in to the ED due to hypotension during office visit. Patient was found to be bacteremic with bacteroids fragilis. Admitted in ICU due to hypotension and hypothermia. On vancomycin and levo . BCx X2 negative. Sputum positive for MRSA and Stenotrophomonas.     Diagnosis:  >Encephalopathy  >Sepsis  >Bacteremia  >Anemia  >DVT/PE  >Transaminitis  >Aspiration pneumonia    --------------------------------------------Neuro--------------------------------------  -She is AAOx2 at baseline on evaluation  -Encephalopathic and got intubated on 10/24  -Ammonia trending down from 152 > 130 > 116 >103>131>126>90  -On lactulose 30g q 6h and rifaximin  -hypothermia has been resolved now  -INR>1.63>1.71>1.91  -Pt with multiorgan failure secondary to septic shock  -Hold CT head for now as AMS was most likely due to hepatic encephalopathy and pt didn't have any focal neurological deficit when she started having AMS  -her pupils are reactive to light b/l    -------------------------------------CVS-------------------------------  -Patient is hypotensive secondary septic shock  -On abx Levo, vanc stopped for high vanc trough, will trend vanc trough every morning until vanc trough goes below 20  -NG tube feeding  -Midodrine 10 mg TID  -RIJ for pressors on levophed went down from 0.07 to 0.05  -Hold Lasix home med in the setting of hypotension  -Pt is getting lasix and albumin on and off   -previous ECHO 3-4 months back showed normal EF but new ECHO showed EF 15-20% with severe left ventricular dysfunction  -pt with multiorgan dysfunction sec to septic shock , mixed venous gas from [facility] showed normal O2 saturation , less likely from the low EF   -f/u on autoimmune workup KATHY, anticardiolipin abs , ANCA    --------------------------------Respiratory----------------------  -Intubated and sedated  -CT showed mild b/l pleural effusion and left lower lobe consolidation in ICU on day 2  -aspiration pneumonia  - sputum culture grew MRSA and Stenotrophomonas (sensitive to levo)  -On levo and held vanc due to high trough  -f/u repeat sputum culture  -ID Dr. Patricio    ----------------------------------------Gastrointestinal--------------------------------------  -Patient was bacteremic with bacteroids fragilis, likely secondary from intra abdominal source.   -Patient had multiple SBO in past. Ct abdomen on admission showed ileus, Repeat CT A/P showed ileus and non occlusive ischemic colitis  -No signs of acute abdomen   -Surgery recs > No intervention, pt was admitted in Jan diagnosed with budd Chiari syndrome with portal HTN recs to transfer to [facility] but pt is not stable to transfer  -Pt is in multi organ dysfunction secondary to septic shock  -LFTs are mildly elevated since admission, slightly uptrend today. Likely due to sepsis vs hepatic failure sec to budd Chiari  -BCx X 2 negative.  -ammonia trends down from 150 to 130 >116>103>131>126>90, on lactulose and rifaximin   -had 1BM this am, and 1 BM yesterday  -monitor BM  -GI, DR Chatterjee deferred doing colonoscopy due to multiple comorbidities, FOBT positive on 10/15  -Dr Potts recs has been noted, will repeat hepatitis panel     -----------------------------------------ID---------------------------------------  -Patient was bacteremic with bacteroids likely GI source.  -Lactate is normal 1.3 but trends up to 2.3 >3.7  -Procalcitonin 0.79  -On levo and held vanc due to high trough , will trend every day until it gets below 20  - BP on the lower side, pt is on Levophed baby dose and midodrine 10mg TID  -Monitor vitals Q4      ---------------------------------------------Nephro-------------------------------------  -Kidney function is normal, Cl is 120 with  sodium trends down today 147>150.152>152>151>148>147>142>146  -free water deficit of 500ml  -starts on 120 ml q6h  -TF  -Monitor UO    ----------------------------------------Hem Onc----------------------------------  -Has h/o DVt and PE in past. Patient is on Xarelto at home  -Patient has anemia  -anemia of chronic disease  -Hgb 6.9 1PRBC >8.7 >7 1PRBC> 8.5>1 PRBC> 8.5>9.7>9.6>10> 9.5>8.9>8.4  -Plat trends down but trends up today [id number], no active bleeding  -Hold AC for now, will closely monitor the patient for any bleed  -Folate and B12 normal.   -Dr. Rivera recs has been noted, pt started on thiamine for 3 doses.     ----------------------------------------Endo--------------------------------  -HgbA1c 4  -Fs q6h    ----------------------------------------Prophylaxis----------------------------  DVT: hold Lovenox for low platelets   GI: PPI    ---------------------------------------GOC---------------------------  -DNR   -Pt with multi organ failure secondary to septic shock with poor prognosis  -Palliative is on board       65y Female from home, lives with daughter, ambulates with walker with PMH of recurrent SBO's, s/p exp lap, SB resection in 2015, ex lap, ALBINA in 2018, DVT, PE, on Xarelto, IVC filter, chronic leg swelling, and anasarca, came in to the ED due to hypotension during office visit. Patient was found to be bacteremic with bacteroids fragilis. Admitted in ICU due to hypotension and hypothermia. On vancomycin and levo . BCx X2 negative. Sputum positive for MRSA and Stenotrophomonas.     Diagnosis:  >Encephalopathy  >Sepsis  >Bacteremia  >Anemia  >DVT/PE  >Transaminitis  >Aspiration pneumonia    --------------------------------------------Neuro--------------------------------------  -She is AAOx2 at baseline on evaluation  -Encephalopathic and got intubated on 10/24  -Ammonia trending down from 152 > 130 > 116 >103>131>126>90  -On lactulose 30g q 6h and rifaximin  -hypothermia has been resolved now  -INR>1.63>1.71>1.91  -Pt with multiorgan failure secondary to septic shock  -Hold CT head for now as AMS was most likely due to hepatic encephalopathy and pt didn't have any focal neurological deficit when she started having AMS  -her pupils are reactive to light b/l    -------------------------------------CVS-------------------------------  -Patient is hypotensive secondary septic shock  -On abx Levo, vanc stopped for high vanc trough, will trend vanc trough every morning until vanc trough goes below 20  -NG tube feeding  -Midodrine 10 mg TID  -RIJ for pressors on levophed went down from 0.07 to 0.05  -Hold Lasix home med in the setting of hypotension  -Pt is getting lasix and albumin on and off   -previous ECHO 3-4 months back showed normal EF but new ECHO showed EF 15-20% with severe left ventricular dysfunction  -pt with multiorgan dysfunction sec to septic shock , mixed venous gas from [facility] showed normal O2 saturation , less likely from the low EF   -f/u on autoimmune workup KATHY, anticardiolipin abs , ANCA    --------------------------------Respiratory----------------------  -Intubated and sedated  -CT showed mild b/l pleural effusion and left lower lobe consolidation in ICU on day 2  -aspiration pneumonia  - sputum culture grew MRSA and Stenotrophomonas (sensitive to levo)  -On levo and held vanc due to high trough  -f/u repeat sputum culture  -ID Dr. Patricio    ----------------------------------------Gastrointestinal--------------------------------------  -Patient was bacteremic with bacteroids fragilis, likely secondary from intra abdominal source.   -Patient had multiple SBO in past. Ct abdomen on admission showed ileus, Repeat CT A/P showed ileus and non occlusive ischemic colitis  -No signs of acute abdomen   -Surgery recs > No intervention, pt was admitted in Jan diagnosed with budd Chiari syndrome with portal HTN recs to transfer to [facility] but pt is not stable to transfer  -Pt is in multi organ dysfunction secondary to septic shock  -LFTs are mildly elevated since admission, slightly uptrend today. Likely due to sepsis vs hepatic failure sec to budd Chiari  -BCx X 2 negative.  -ammonia trends down from 150 to 130 >116>103>131>126>90, on lactulose and rifaximin   -had 1BM this am, and 1 BM yesterday  -monitor BM  -GI, DR Chatterjee deferred doing colonoscopy due to multiple comorbidities, FOBT positive on 10/15  -Dr Potts recs has been noted, will repeat hepatitis panel     -----------------------------------------ID---------------------------------------  -Patient was bacteremic with bacteroids likely GI source.  -Lactate is normal 1.3 but trends up to 2.3 >3.7  -Procalcitonin 0.79  -On levo and held vanc due to high trough , will trend every day until it gets below 20  - BP on the lower side, pt is on Levophed baby dose and midodrine 10mg TID  -Monitor vitals Q4      ---------------------------------------------Nephro-------------------------------------  -Kidney function is normal, Cl is 120 with  sodium trends down today 147>150.152>152>151>148>147>142>146  -free water deficit of 500ml  -starts on 120 ml q6h  -TF  urine lytes were sent this morning Fena was calculated 3.1 ,secondary to septic shock  -Monitor UO    ----------------------------------------Hem Onc----------------------------------  -Has h/o DVt and PE in past. Patient is on Xarelto at home  -Patient has anemia  -anemia of chronic disease  -Hgb 6.9 1PRBC >8.7 >7 1PRBC> 8.5>1 PRBC> 8.5>9.7>9.6>10> 9.5>8.9>8.4  -Plat trends down but trends up today [id number], no active bleeding  -Hold AC for now, will closely monitor the patient for any bleed  -Folate and B12 normal.   -Dr. Miguel aguilar has been noted, pt started on thiamine for 3 doses.     ----------------------------------------Endo--------------------------------  -HgbA1c 4  -Fs q6h    ----------------------------------------Prophylaxis----------------------------  DVT: hold Lovenox for low platelets   GI: PPI    ---------------------------------------GOC---------------------------  -DNR   -Pt with multi organ failure secondary to septic shock with poor prognosis  -Palliative is on board       65y Female from home, lives with daughter, ambulates with walker with PMH of recurrent SBO's, s/p exp lap, SB resection in 2015, ex lap, ALBINA in 2018, DVT, PE, on Xarelto, IVC filter, chronic leg swelling, and anasarca, came in to the ED due to hypotension during office visit. Patient was found to be bacteremic with bacteroids fragilis. Admitted in ICU due to hypotension and hypothermia. On vancomycin and levo . BCx X2 negative. Sputum positive for MRSA and Stenotrophomonas.     Diagnosis:  >Encephalopathy  >Sepsis  >Bacteremia  >Anemia  >DVT/PE  >Transaminitis  >Aspiration pneumonia    --------------------------------------------Neuro--------------------------------------  -She is AAOx2 at baseline on evaluation  -Encephalopathic and got intubated on 10/24  -Ammonia trending down from 152 > 130 > 116 >103>131>126>90  -On lactulose 30g q 6h and rifaximin  -hypothermia has been resolved now  -INR>1.63>1.71>1.91  -Pt with multiorgan failure secondary to septic shock  -Hold CT head for now as AMS was most likely due to hepatic encephalopathy and pt didn't have any focal neurological deficit when she started having AMS  -her pupils are reactive to light b/l    -------------------------------------CVS-------------------------------  -Patient is hypotensive secondary septic shock  -On abx Levo, vanc stopped for high vanc trough, will trend vanc trough every morning until vanc trough goes below 20  -NG tube feeding  -Midodrine 10 mg TID  -RIJ for pressors on levophed went down from 0.07 to 0.05  -Hold Lasix home med in the setting of hypotension  -Pt is getting lasix and albumin on and off   -previous ECHO 3-4 months back showed normal EF but new ECHO showed EF 15-20% with severe left ventricular dysfunction  -pt with multiorgan dysfunction sec to septic shock , mixed venous gas from [facility] showed normal O2 saturation , less likely from the low EF   -f/u on autoimmune workup KATHY, anticardiolipin antibodies IgG IgM , ANCA  -Anticardiolipin abs +ve    --------------------------------Respiratory----------------------  -Intubated and sedated  -CT showed mild b/l pleural effusion and left lower lobe consolidation in ICU on day 2  -aspiration pneumonia  - sputum culture grew MRSA and Stenotrophomonas (sensitive to levo)  -On levo and held vanc due to high trough  -f/u repeat sputum culture  -ID Dr. Patricio    ----------------------------------------Gastrointestinal--------------------------------------  -Patient was bacteremic with bacteroids fragilis, likely secondary from intra abdominal source.   -Patient had multiple SBO in past. Ct abdomen on admission showed ileus, Repeat CT A/P showed ileus and non occlusive ischemic colitis  -No signs of acute abdomen   -Surgery recs > No intervention, pt was admitted in Jan diagnosed with budd Chiari syndrome with portal HTN recs to transfer to [facility] but pt is not stable to transfer  -Pt is in multi organ dysfunction secondary to septic shock  -LFTs are mildly elevated since admission, slightly uptrend today. Likely due to sepsis vs hepatic failure sec to budd Chiari  -BCx X 2 negative.  -ammonia trends down from 150 to 130 >116>103>131>126>90, on lactulose and rifaximin   -had 1BM this am, and 1 BM yesterday  -monitor BM  -GI, DR Chatterjee deferred doing colonoscopy due to multiple comorbidities, FOBT positive on 10/15  -Dr Potts recs has been noted, will repeat hepatitis panel     -----------------------------------------ID---------------------------------------  -Patient was bacteremic with bacteroids likely GI source.  -Lactate is normal 1.3 but trends up to 2.3 >3.7  -Procalcitonin 0.79  -On levo and held vanc due to high trough , will trend every day until it gets below 20  - BP on the lower side, pt is on Levophed baby dose and midodrine 10mg TID  -Monitor vitals Q4      ---------------------------------------------Nephro-------------------------------------  -Kidney function is normal, Cl is 120 with  sodium trends down today 147>150.152>152>151>148>147>142>146  -free water deficit of 500ml  -starts on 120 ml q6h  -TF  -urine lytes were sent this morning Fena was calculated 3.1,   -Monitor UO    ----------------------------------------Hem Onc----------------------------------  -Has h/o DVt and PE in past. Patient is on Xarelto at home  -Patient has anemia  -anemia of chronic disease  -Hgb 6.9 1PRBC >8.7 >7 1PRBC> 8.5>1 PRBC> 8.5>9.7>9.6>10> 9.5>8.9>8.4  -Plat trends down but trends up today [id number], no active bleeding  -Hold AC for now, will closely monitor the patient for any bleed  -Folate and B12 normal.   -Dr. Miguel aguilar has been noted, pt started on thiamine for 3 doses.     ----------------------------------------Endo--------------------------------  -HgbA1c 4  -Fs q6h    ----------------------------------------Prophylaxis----------------------------  DVT: hold Lovenox for low platelets   GI: PPI    ---------------------------------------GOC---------------------------  -DNR   -Pt with multi organ failure secondary to septic shock with poor prognosis  -Palliative is on board

## 2020-10-30 NOTE — CHART NOTE - NSCHARTNOTEFT_GEN_A_CORE
Assessment:   Patient is a 65y old  Female who presents with a chief complaint of Hypotension (30 Oct 2020 10:02). Pt continues intubated, cont with TF order. MOF, poor prognosis, being followed by Palliative care, noted.        Diet Prescription: Diet, NPO with Tube Feed:   Tube Feeding Modality: Nasogastric  Jevity 1.5 Papi  Total Volume for 24 Hours (mL): 960  Continuous  Starting Tube Feed Rate {mL per Hour}: 10  Until Goal Tube Feed Rate (mL per Hour): 40  Tube Feed Duration (in Hours): 24  Tube Feed Start Time: 12:00 (10-26-20 @ 12:07)        Daily     Daily Weight in k.6 (30 Oct 2020 07:30)  Weight in k.8 (29 Oct 2020 07:15)  Weight in k.7 (28 Oct 2020 07:30)  Weight in k.2 (27 Oct 2020 07:00)  Weight in k (26 Oct 2020 07:15)  Weight in k.8 (25 Oct 2020 07:00)  Weight in k.1 (23 Oct 2020 07:00)  Weight in k.6 (22 Oct 2020 07:00)  Weight in k.2 (21 Oct 2020 02:26)        Pertinent Medications: MEDICATIONS  (STANDING):  chlorhexidine 0.12% Liquid 15 milliLiter(s) Oral Mucosa every 12 hours  chlorhexidine 2% Cloths 1 Application(s) Topical daily  fentaNYL   Infusion. 1 MICROgram(s)/kG/Hr (5.62 mL/Hr) IV Continuous <Continuous>  lactulose Syrup 30 Gram(s) Oral every 6 hours  levoFLOXacin IVPB 750 milliGRAM(s) IV Intermittent every 24 hours  levoFLOXacin IVPB      midodrine. 10 milliGRAM(s) Oral three times a day  norepinephrine Infusion 0.05 MICROgram(s)/kG/Min (2.63 mL/Hr) IV Continuous <Continuous>  nystatin Powder 1 Application(s) Topical two times a day  pantoprazole  Injectable 40 milliGRAM(s) IV Push two times a day  polyethylene glycol 3350 17 Gram(s) Oral daily  rifAXIMin 550 milliGRAM(s) Oral two times a day  thiamine IVPB 200 milliGRAM(s) IV Intermittent three times a day    MEDICATIONS  (PRN):  sodium chloride 0.9% lock flush 10 milliLiter(s) IV Push every 1 hour PRN Pre/post blood products, medications, blood draw, and to maintain line patency    Pertinent Labs: 10-30 Na146 mmol/L<H> Glu 114 mg/dL<H> K+ 3.3 mmol/L<L> Cr  1.56 mg/dL<H> BUN 10 mg/dL 10- Phos 1.5 mg/dL<L> 10-30 Alb 2.6 g/dL<L> 10-29 Chol --    LDL --    HDL --    Trig 69 mg/dL     CAPILLARY BLOOD GLUCOSE      POCT Blood Glucose.: 102 mg/dL (30 Oct 2020 12:13)  POCT Blood Glucose.: 116 mg/dL (30 Oct 2020 05:42)  POCT Blood Glucose.: 130 mg/dL (29 Oct 2020 22:43)  POCT Blood Glucose.: 160 mg/dL (29 Oct 2020 17:47)          Previous Nutrition Diagnosis:   [ ] Altered GI function  [ ]Inadequate Oral Intake [ ] Swallowing Difficulty   [ ] Altered nutrition related labs [ ] Increased Nutrient Needs [ ] Overweight/Obesity   [ ] Unintended Weight Loss [ ] Food & Nutrition Related Knowledge Deficit [x ] Malnutrition (severe PCM)  [ ] Other:     Nutrition Diagnosis is [x ] ongoing  [ ] resolved [ ] not applicable     New Nutrition Diagnosis: [ ] not applicable       Interventions:   Recommend  [ ] Change Diet To:  [ ] Nutrition Supplement  [x ] Nutrition Support: TF as medically feasible and consistent with goals of care. MD to monitor. RD available.   [ ] Other:     Monitoring and Evaluation:    [ x ] Tolerance to diet prescription [ x ] weights [ x ] labs[ x ] follow up per protocol  [ ] other:

## 2020-10-30 NOTE — PROGRESS NOTE ADULT - PROBLEM SELECTOR PLAN 2
2/2 shock/bacteremia; comorbid MOF. Patient remains sedated/intubated; on pressor, IV abx for bacteroides bacteremia, likely gi source. Poor prognosis. No significant improvement. Daughter/Jus is surrogate; DNR on file.

## 2020-10-30 NOTE — PROGRESS NOTE ADULT - ASSESSMENT
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 Repeat CT 10/21 suggested  non-specific enterocolitis, noninfectious inflammatory bowel disease or ischemic bowel along with ileus, and concern for SBO given mild luminal narrowing at distal small bowel. Patient remained hypotensive, hypothermic, requiring pressor support, septic and got intubated on 10/24 and concern for HCAP vs. aspiration.  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 Liver enzymes were /are up-trending, along with bilirubin, was suspected due to ongoing sepsis. Now sputum grew MRSA and Stenotrophomonas maltophilia. Patient is with EF 10-15%.     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 (as per d/w primary team, it was non-conclusive)  - No caudate lobe hypertrophy reported (present in 75% of cases), no macroregenerative nodules reported, characteristic pattern of parenchymal perfusion not described,, but study reported limited due to motion artifact   - Venography would be gold standard for diagnosis, discussed with IR, if patient will be more stable (possibly outpatient), [facility] be [facility] for Dx and potential intervention, this time patient remains septic on pressor support  - was already on full dose anticoagulation, was being held b/o coagulopathy and now b/o severe thrombocytopenia  (Portal vein was patent)  - anticardiolipin antibody pos 1x (prior negative 1x)     # Abnormal liver enzymes with hyperbilirubinemia and coagulopathy, now MOF  # Ascites  - Elevated liver enzymes partially due to ongoing sepsis (only mildly elevated or normal on admission and started to worsen when patient condition deteriorated, became hypotensive, hypothermic; AST 46-28-...248...-104; ALT 70-45...105-..67; ALP nl 113; Se bi 0.6-1.4-...5.5)  - Mgmt. of sepsis per ICU  - Bilirubin was mildly elevated but had severe coagulopathy, can check Factor VIII   - Bilirubin still up-trending (6.8/direct 4.0)  - Also noted severely reduced LVEF (10-15%) and RV dysfunction, thus hepatic congestion can also contribute (had nl EF in 5/2020)  - Dx paracentesis could be useful (last 2 sets of BCx nl) but as d/w IR, no safe window based on last CT  - Consider repeat US abd bedside  - Given that still up-trending bilirubin, and coagulopathy, can consider re-sending full liver workup including viral hep panel, EBV, CMV, HSV, VZV, hep E, autoimmune workup (prior liver workup: KATHY neg, AMA neg, HBsAg neg, HBcAb IgM neg, HAV IgM neg)    # Encephalopathy  - consider CT head (on full dose AC)  - c/w lactulose and c/w rifaximin       # Anemia, thrombocytopenia   - possibly malnutrition plus chronic GI loss and sepsis  - Hematology following, appreciated  - FOBT pos, GI f/u    Will continue to follow  D/w primary team  Thank you for the consult     64 yo Female with Hx of recurrent SBO`s s/p exp lap, small bowel resection in 2015, ex lap, ALBINA in 2018, DVT, PE (was on Xarelto), IVC filter and s/p supra-hepatic IVC thrombosis/occlusion, anemia, anasarca was sent to ED by PCP for hypotension, generalized weakness and chills and was admitted on 10/13/2020 for hypotension, r/p sepsis , found to have Bacteroides fragilis bacteremia, suspected GI source, was treated with cefepime + metronidazole, remained hypothermic, hypotensive, was transferred to ICU and switched to meropenem on 10/21. Repeat CT 10/21 suggested  non-specific enterocolitis, noninfectious inflammatory bowel disease or ischemic bowel along with ileus, and concern for SBO given mild luminal narrowing at distal small bowel. Patient remained hypotensive, hypothermic, requiring pressor support, septic and got intubated on 10/24 and concern for HCAP vs. aspiration.  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 Liver enzymes were /are up-trending, along with bilirubin, was suspected due to ongoing sepsis. Now sputum grew MRSA and Stenotrophomonas maltophilia. Patient is with EF 10-15%.     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 (as per d/w primary team, it was non-conclusive)  - No caudate lobe hypertrophy reported (present in 75% of cases), no macroregenerative nodules reported, characteristic pattern of parenchymal perfusion not described,, but study reported limited due to motion artifact   - Venography would be gold standard for diagnosis, discussed with IR, if patient will be more stable (possibly outpatient), can be evaluated at [facility] for Dx and potential intervention, this time patient remains septic on pressor support  - was already on full dose anticoagulation, was being held b/o coagulopathy and now b/o severe thrombocytopenia  (Portal vein was patent)  - anticardiolipin antibody pos 1x (prior negative 1x)     # Abnormal liver enzymes with hyperbilirubinemia and coagulopathy, now MOF  # Ascites  - Elevated liver enzymes partially due to ongoing sepsis (only mildly elevated or normal on admission and started to worsen when patient condition deteriorated, became hypotensive, hypothermic; AST 46-28-...248...-104; ALT 70-45...105-..67; ALP nl 113; Se bi 0.6-1.4-...5.5)  - Mgmt. of sepsis per ICU  - Bilirubin was mildly elevated but had severe coagulopathy, can check Factor VIII   - Bilirubin still up-trending (6.8/direct 4.0)  - Also noted severely reduced LVEF (10-15%) and RV dysfunction, thus hepatic congestion can also contribute (had nl EF in 5/2020)  - Dx paracentesis could be useful (last 2 sets of BCx nl) but as d/w IR, no safe window based on last CT  - Consider repeat US abd bedside  - Given that still up-trending bilirubin, and coagulopathy, can consider re-sending full liver workup including viral hep panel, EBV, CMV, HSV, VZV, hep E, autoimmune workup, ceruloplasmin (prior liver workup: KATHY neg, AMA neg, HBsAg neg, HBcAb IgM neg, HAV IgM neg)    # Encephalopathy  - consider CT head (on full dose AC)  - c/w lactulose and c/w rifaximin       # Anemia, thrombocytopenia   - Hematology following, appreciated  - FOBT pos, GI f/u    Will continue to follow  D/w primary team  Thank you for the consult

## 2020-10-30 NOTE — PROGRESS NOTE ADULT - SUBJECTIVE AND OBJECTIVE BOX
Patient is seen and examined at the bed side, is Normothermic.  She is off sedation but not awake yet, remains  on vent. and Low dose pressor. The Vancomycin level is high.       REVIEW OF SYSTEMS: Unable to obtain due to mental status         ALLERGIES: No Known Allergies        ICU Vital Signs Last 24 Hrs  T(C): 35.8 (30 Oct 2020 15:54), Max: 37.1 (30 Oct 2020 04:00)  T(F): 96.5 (30 Oct 2020 15:54), Max: 98.8 (30 Oct 2020 04:00)  HR: 100 (30 Oct 2020 17:45) (73 - 131)  BP: 96/74 (30 Oct 2020 17:45) (89/69 - 114/79)  BP(mean): 78 (30 Oct 2020 17:45) (71 - 93)  ABP: --  ABP(mean): --  RR: 25 (30 Oct 2020 17:45) (18 - 36)  SpO2: 100% (30 Oct 2020 17:45) (85% - 100%)        PHYSICAL EXAM:  GENERAL: Intubated /vented,   CHEST/LUNG: Not using accessory muscles   HEART: s1 and s2 present  ABDOMEN:  Nontender and  Nondistended  EXTREMITIES: No pedal  edema  CNS: Intubated/vented          LABS:                        8.4    8.37  )-----------( 32       ( 30 Oct 2020 06:30 )             24.9                           8.9    8.82  )-----------( 27       ( 29 Oct 2020 05:11 )             26.3       10-30    146<H>  |  115<H>  |  10  ----------------------------<  114<H>  3.3<L>   |  23  |  1.56<H>    Ca    8.0<L>      30 Oct 2020 06:30  Phos  1.5     10-30  Mg     1.8     10-30    TPro  4.5<L>  /  Alb  2.6<L>  /  TBili  6.8<H>  /  DBili  4.0<H>  /  AST  124<H>  /  ALT  63<H>  /  AlkPhos  237<H>  10-30    10-29    142  |  114<H>  |  9   ----------------------------<  112<H>  3.5   |  22  |  1.36<H>    Ca    7.8<L>      29 Oct 2020 05:11  Phos  2.0     10-28  Mg     1.9     10-28    TPro  4.4<L>  /  Alb  2.3<L>  /  TBili  5.5<H>  /  DBili  x   /  AST  104<H>  /  ALT  67<H>  /  AlkPhos  113  10-29      Vancomycin Level, Trough (10.30.20 @ 06:30)   Vancomycin Level, Trough: 28.1:    Vancomycin Level, Trough (10.29.20 @ 05:11)   Vancomycin Level, Trough: 36.3    Procalcitonin, Serum (10.15.20 @ 11:48)   Procalcitonin, Serum: 0.16: Procalcitonin (PCT) Interpretation (ng/mL) - Diagnosis of systemic   bacterial infection/sepsis         MEDICATIONS  (STANDING):    chlorhexidine 0.12% Liquid 15 milliLiter(s) Oral Mucosa every 12 hours  chlorhexidine 2% Cloths 1 Application(s) Topical daily  fentaNYL   Infusion. 1 MICROgram(s)/kG/Hr (5.62 mL/Hr) IV Continuous <Continuous>  lactulose Syrup 30 Gram(s) Oral every 6 hours  levoFLOXacin IVPB 750 milliGRAM(s) IV Intermittent every 24 hours  levoFLOXacin IVPB      midodrine. 10 milliGRAM(s) Oral three times a day  norepinephrine Infusion 0.05 MICROgram(s)/kG/Min (2.63 mL/Hr) IV Continuous <Continuous>  nystatin Powder 1 Application(s) Topical two times a day  pantoprazole  Injectable 40 milliGRAM(s) IV Push two times a day  polyethylene glycol 3350 17 Gram(s) Oral daily  rifAXIMin 550 milliGRAM(s) Oral two times a day  thiamine IVPB 200 milliGRAM(s) IV Intermittent three times a day        RADIOLOGY & ADDITIONAL TESTS:    10/25/20: Xray Chest 1 View- PORTABLE-Routine (Xray Chest 1 View- PORTABLE-Routine in AM.) (10.25.20 @ 09:21) Frontal expiratory view of the chest shows the heart to be similar in size. Endotracheal tube, right jugular line and feeding tube remain present.    The lungs show similar left upper lobe infiltrate with progression of right perihilar infiltrate. Pleural effusions are similar. There is no evidence of pneumothorax.      10/23/20 : Xray Chest 1 View-PORTABLE IMMEDIATE (Xray Chest 1 View-PORTABLE IMMEDIATE .) (10.23.20 @ 19:09) Feeding tube tip near GE junction as noted. Questionable right upper lobe infiltrate. Follow-up study is recommended as clinically warranted.      10/21/20 : CT Abdomen and Pelvis w/ Oral Cont and w/ IV Cont (10.21.20 @ 15:59) Mural thickening of the left colon and rectum. Liquid stool in the colon. Apparent segmental mural thickening of the mid to distal small bowel and the proximal duodenum. Findings may represent nonspecific enterocolitis, noninfectious inflammatory bowel disease, or ischemic bowel. Clinical correlation is recommended. The celiac axis artery, SMA, and KINA are patent without stenosis.    Dilatation of the mid small bowel is again noted. Oral contrast has reached the terminal ileum. Findings may represent small bowel obstruction, or ileus related to nonspecific enterocolitis.   Cholelithiasis.    Moderate to large ascites in the abdomen, increased since the previous examination. 5 mm nonspecific noncalcified left upper lobe lung nodule; if the patient's is in the high risk category (i.e. smoker), follow-up chest CT may be pursued in 12 months to ensure stability.    Combination of atelectasis and consolidation in the left lower lobe.    Possible 1.0 cm hypodense lesion in the left lobe of the thyroid. Thyroid ultrasound may be pursued for further evaluation.   Mild bilateral pleural effusions.    Aging determinate compression fracture at T5 vertebra.        10/13/20 : CT Abdomen and Pelvis w/ IV Cont (10.13.20 @ 18:50) Diffuse dilatation of small bowel loops without a clear transition is slightly increased, likely an ileus.  Decreased moderate ascites.    1.5 cm pancreatic versus peripancreatic soft tissue nodule    10/13/20 : Xray Chest 1 View- PORTABLE-Urgent (Xray Chest 1 View- PORTABLE-Urgent .) (10.13.20 @ 16:34) Clear lungs.          MICROBIOLOGY DATA:    Culture - Sputum . (10.26.20 @ 00:26)   - Ampicillin/Sulbactam: R <=8/4   - Cefazolin: R >16   - Ceftazidime: I 16   - Clindamycin: R >4   - Erythromycin: R >4   - Gentamicin: S <=1 Should not be used as monotherapy   - Levofloxacin: S <=0.5   - Linezolid: S 2   - Oxacillin: R >2   - Penicillin: R >8   - RIF- Rifampin: S <=1 Should not be used as monotherapy   - Tetra/Doxy: S <=1   - Trimethoprim/Sulfamethoxazole: S <=0.5/9.5   - Trimethoprim/Sulfamethoxazole: S <=0.5/9.5   - Vancomycin: S 1   Gram Stain:  Moderate polymorphonuclear leukocytes per low power field   Rare Squamous epithelial cells per low power field   Moderate Gram Positive Cocci in Clusters per oil power field   Moderate Yeast per oil power field   Specimen Source: .Sputum Sputum   Culture Results:  Moderate Methicillin resistant Staphylococcus aureus   Moderate Stenotrophomonas maltophilia   Normal Respiratory Erin present   Organism Identification: Methicillin resistant Staphylococcus aureus   Stenotrophomonas maltophilia   Organism: Methicillin resistant Staphylococcus aureus   Organism: Stenotrophomonas maltophilia       MRSA/MSSA PCR (10.21.20 @ 09:41)   MRSA PCR Result.: Detected:       Culture - Blood (10.20.20 @ 22:09)   Specimen Source: .Blood Blood   Culture Results:  No growth to date.     Culture - Blood (10.20.20 @ 22:09)   Specimen Source: .Blood Blood   Culture Results:   No growth to date.     Culture - Blood in AM (10.16.20 @ 10:13)   Specimen Source: .Blood Blood-Peripheral   Culture Results:   No growth to date.     Culture - Blood in AM (10.16.20 @ 10:13)   Specimen Source: .Blood Blood-Peripheral   Culture Results:   No growth to date.     Culture - Blood (10.13.20 @ 22:23)   Growth in anaerobic bottle: Gram Negative Rods   Specimen Source: .Blood Blood-Peripheral   Organism: Blood Culture PCR   Culture Results:   Growth in anaerobic bottle: Bacteroides fragilis   "Susceptibilities not performed"     Culture - Blood (10.13.20 @ 22:23)   Specimen Source: .Blood Blood-Peripheral   Culture Results:   No growth to date.     Urine Microscopic-Add On (NC) (10.13.20 @ 21:39)   Bacteria: Moderate /HPF   Epithelial Cells: Moderate /HPF   Red Blood Cell - Urine: 5-10 /HPF   White Blood Cell - Urine: 6-10 /HPF            Patient is seen and examined at the bed side, is Normothermic.  She is opening her eyes and remains on vent. and Low dose pressor. The Vancomycin level is high, 28.1.      REVIEW OF SYSTEMS: Unable to obtain due to mental status         ALLERGIES: No Known Allergies        ICU Vital Signs Last 24 Hrs  T(C): 35.8 (30 Oct 2020 15:54), Max: 37.1 (30 Oct 2020 04:00)  T(F): 96.5 (30 Oct 2020 15:54), Max: 98.8 (30 Oct 2020 04:00)  HR: 100 (30 Oct 2020 17:45) (73 - 131)  BP: 96/74 (30 Oct 2020 17:45) (89/69 - 114/79)  BP(mean): 78 (30 Oct 2020 17:45) (71 - 93)  ABP: --  ABP(mean): --  RR: 25 (30 Oct 2020 17:45) (18 - 36)  SpO2: 100% (30 Oct 2020 17:45) (85% - 100%)        PHYSICAL EXAM:  GENERAL: Intubated /vented,   CHEST/LUNG: Not using accessory muscles   HEART: s1 and s2 present  ABDOMEN:  Nontender and  Nondistended  EXTREMITIES: No pedal  edema  CNS: Intubated/vented          LABS:                        8.4    8.37  )-----------( 32       ( 30 Oct 2020 06:30 )             24.9                           8.9    8.82  )-----------( 27       ( 29 Oct 2020 05:11 )             26.3         10-30    146<H>  |  115<H>  |  10  ----------------------------<  114<H>  3.3<L>   |  23  |  1.56<H>    Ca    8.0<L>      30 Oct 2020 06:30  Phos  1.5     10-30  Mg     1.8     10-30    TPro  4.5<L>  /  Alb  2.6<L>  /  TBili  6.8<H>  /  DBili  4.0<H>  /  AST  124<H>  /  ALT  63<H>  /  AlkPhos  237<H>  10-30    10-29    142  |  114<H>  |  9   ----------------------------<  112<H>  3.5   |  22  |  1.36<H>    Ca    7.8<L>      29 Oct 2020 05:11  Phos  2.0     10-28  Mg     1.9     10-28    TPro  4.4<L>  /  Alb  2.3<L>  /  TBili  5.5<H>  /  DBili  x   /  AST  104<H>  /  ALT  67<H>  /  AlkPhos  113  10-29      Vancomycin Level, Trough (10.30.20 @ 06:30)   Vancomycin Level, Trough: 28.1:    Vancomycin Level, Trough (10.29.20 @ 05:11)   Vancomycin Level, Trough: 36.3    Procalcitonin, Serum (10.15.20 @ 11:48)   Procalcitonin, Serum: 0.16: Procalcitonin (PCT) Interpretation (ng/mL) - Diagnosis of systemic   bacterial infection/sepsis         MEDICATIONS  (STANDING):    chlorhexidine 0.12% Liquid 15 milliLiter(s) Oral Mucosa every 12 hours  chlorhexidine 2% Cloths 1 Application(s) Topical daily  fentaNYL   Infusion. 1 MICROgram(s)/kG/Hr (5.62 mL/Hr) IV Continuous <Continuous>  lactulose Syrup 30 Gram(s) Oral every 6 hours  levoFLOXacin IVPB 750 milliGRAM(s) IV Intermittent every 24 hours  levoFLOXacin IVPB      midodrine. 10 milliGRAM(s) Oral three times a day  norepinephrine Infusion 0.05 MICROgram(s)/kG/Min (2.63 mL/Hr) IV Continuous <Continuous>  nystatin Powder 1 Application(s) Topical two times a day  pantoprazole  Injectable 40 milliGRAM(s) IV Push two times a day  polyethylene glycol 3350 17 Gram(s) Oral daily  rifAXIMin 550 milliGRAM(s) Oral two times a day  thiamine IVPB 200 milliGRAM(s) IV Intermittent three times a day        RADIOLOGY & ADDITIONAL TESTS:    10/25/20: Xray Chest 1 View- PORTABLE-Routine (Xray Chest 1 View- PORTABLE-Routine in AM.) (10.25.20 @ 09:21) Frontal expiratory view of the chest shows the heart to be similar in size. Endotracheal tube, right jugular line and feeding tube remain present.    The lungs show similar left upper lobe infiltrate with progression of right perihilar infiltrate. Pleural effusions are similar. There is no evidence of pneumothorax.      10/23/20 : Xray Chest 1 View-PORTABLE IMMEDIATE (Xray Chest 1 View-PORTABLE IMMEDIATE .) (10.23.20 @ 19:09) Feeding tube tip near GE junction as noted. Questionable right upper lobe infiltrate. Follow-up study is recommended as clinically warranted.      10/21/20 : CT Abdomen and Pelvis w/ Oral Cont and w/ IV Cont (10.21.20 @ 15:59) Mural thickening of the left colon and rectum. Liquid stool in the colon. Apparent segmental mural thickening of the mid to distal small bowel and the proximal duodenum. Findings may represent nonspecific enterocolitis, noninfectious inflammatory bowel disease, or ischemic bowel. Clinical correlation is recommended. The celiac axis artery, SMA, and KINA are patent without stenosis.    Dilatation of the mid small bowel is again noted. Oral contrast has reached the terminal ileum. Findings may represent small bowel obstruction, or ileus related to nonspecific enterocolitis.   Cholelithiasis.    Moderate to large ascites in the abdomen, increased since the previous examination. 5 mm nonspecific noncalcified left upper lobe lung nodule; if the patient's is in the high risk category (i.e. smoker), follow-up chest CT may be pursued in 12 months to ensure stability.    Combination of atelectasis and consolidation in the left lower lobe.    Possible 1.0 cm hypodense lesion in the left lobe of the thyroid. Thyroid ultrasound may be pursued for further evaluation.   Mild bilateral pleural effusions.    Aging determinate compression fracture at T5 vertebra.        10/13/20 : CT Abdomen and Pelvis w/ IV Cont (10.13.20 @ 18:50) Diffuse dilatation of small bowel loops without a clear transition is slightly increased, likely an ileus.  Decreased moderate ascites.    1.5 cm pancreatic versus peripancreatic soft tissue nodule    10/13/20 : Xray Chest 1 View- PORTABLE-Urgent (Xray Chest 1 View- PORTABLE-Urgent .) (10.13.20 @ 16:34) Clear lungs.          MICROBIOLOGY DATA:    Culture - Sputum . (10.26.20 @ 00:26)   - Ampicillin/Sulbactam: R <=8/4   - Cefazolin: R >16   - Ceftazidime: I 16   - Clindamycin: R >4   - Erythromycin: R >4   - Gentamicin: S <=1 Should not be used as monotherapy   - Levofloxacin: S <=0.5   - Linezolid: S 2   - Oxacillin: R >2   - Penicillin: R >8   - RIF- Rifampin: S <=1 Should not be used as monotherapy   - Tetra/Doxy: S <=1   - Trimethoprim/Sulfamethoxazole: S <=0.5/9.5   - Trimethoprim/Sulfamethoxazole: S <=0.5/9.5   - Vancomycin: S 1   Gram Stain:  Moderate polymorphonuclear leukocytes per low power field   Rare Squamous epithelial cells per low power field   Moderate Gram Positive Cocci in Clusters per oil power field   Moderate Yeast per oil power field   Specimen Source: .Sputum Sputum   Culture Results:  Moderate Methicillin resistant Staphylococcus aureus   Moderate Stenotrophomonas maltophilia   Normal Respiratory Erin present   Organism Identification: Methicillin resistant Staphylococcus aureus   Stenotrophomonas maltophilia   Organism: Methicillin resistant Staphylococcus aureus   Organism: Stenotrophomonas maltophilia       MRSA/MSSA PCR (10.21.20 @ 09:41)   MRSA PCR Result.: Detected:       Culture - Blood (10.20.20 @ 22:09)   Specimen Source: .Blood Blood   Culture Results:  No growth to date.     Culture - Blood (10.20.20 @ 22:09)   Specimen Source: .Blood Blood   Culture Results:   No growth to date.     Culture - Blood in AM (10.16.20 @ 10:13)   Specimen Source: .Blood Blood-Peripheral   Culture Results:   No growth to date.     Culture - Blood in AM (10.16.20 @ 10:13)   Specimen Source: .Blood Blood-Peripheral   Culture Results:   No growth to date.     Culture - Blood (10.13.20 @ 22:23)   Growth in anaerobic bottle: Gram Negative Rods   Specimen Source: .Blood Blood-Peripheral   Organism: Blood Culture PCR   Culture Results:   Growth in anaerobic bottle: Bacteroides fragilis   "Susceptibilities not performed"     Culture - Blood (10.13.20 @ 22:23)   Specimen Source: .Blood Blood-Peripheral   Culture Results:   No growth to date.     Urine Microscopic-Add On (NC) (10.13.20 @ 21:39)   Bacteria: Moderate /HPF   Epithelial Cells: Moderate /HPF   Red Blood Cell - Urine: 5-10 /HPF   White Blood Cell - Urine: 6-10 /HPF

## 2020-10-30 NOTE — PROGRESS NOTE ADULT - SUBJECTIVE AND OBJECTIVE BOX
Patient was seen and examined  Patient is a 65y old  Female who presents with a chief complaint of Hypotension (29 Oct 2020 18:34)      INTERVAL HPI/OVERNIGHT EVENTS:  T(C): 37.1 (10-30-20 @ 04:00), Max: 37.1 (10-30-20 @ 04:00)  HR: 112 (10-30-20 @ 09:00) (73 - 131)  BP: 100/76 (10-30-20 @ 09:00) (95/73 - 114/79)  RR: 25 (10-30-20 @ 09:00) (18 - 36)  SpO2: 100% (10-30-20 @ 09:00) (85% - 100%)  Wt(kg): --  I&O's Summary    29 Oct 2020 07:01  -  30 Oct 2020 07:00  --------------------------------------------------------  IN: 2054.1 mL / OUT: 1705 mL / NET: 349.1 mL    30 Oct 2020 07:01  -  30 Oct 2020 09:29  --------------------------------------------------------  IN: 427 mL / OUT: 80 mL / NET: 347 mL        LABS:                        8.4    8.37  )-----------( 32       ( 30 Oct 2020 06:30 )             24.9     10-30    146<H>  |  115<H>  |  10  ----------------------------<  114<H>  3.3<L>   |  23  |  1.56<H>    Ca    8.0<L>      30 Oct 2020 06:30  Phos  1.5     10-30  Mg     1.8     10-30    TPro  4.5<L>  /  Alb  2.6<L>  /  TBili  6.8<H>  /  DBili  4.0<H>  /  AST  124<H>  /  ALT  63<H>  /  AlkPhos  237<H>  10-30    PT/INR - ( 29 Oct 2020 05:11 )   PT: 22.8 sec;   INR: 1.98 ratio         PTT - ( 29 Oct 2020 05:11 )  PTT:56.5 sec    CAPILLARY BLOOD GLUCOSE      POCT Blood Glucose.: 116 mg/dL (30 Oct 2020 05:42)  POCT Blood Glucose.: 130 mg/dL (29 Oct 2020 22:43)  POCT Blood Glucose.: 160 mg/dL (29 Oct 2020 17:47)  POCT Blood Glucose.: 118 mg/dL (29 Oct 2020 11:30)    LIPID PANEL  Cholesterol --  LDL --  HDL --  RATIO HDL/Total Cholesterol --  Triglyceride 69  LIPID PANEL  Cholesterol --  LDL --  HDL --  RATIO HDL/Total Cholesterol --  Triglyceride 59      ABG - ( 30 Oct 2020 04:08 )  pH, Arterial: 7.42  pH, Blood: x     /  pCO2: 35    /  pO2: 144   / HCO3: 22    / Base Excess: -1.3  /  SaO2: 98                    MEDICATIONS  (STANDING):  chlorhexidine 0.12% Liquid 15 milliLiter(s) Oral Mucosa every 12 hours  chlorhexidine 2% Cloths 1 Application(s) Topical daily  fentaNYL   Infusion. 1 MICROgram(s)/kG/Hr (5.62 mL/Hr) IV Continuous <Continuous>  lactulose Syrup 30 Gram(s) Oral every 6 hours  levoFLOXacin IVPB 750 milliGRAM(s) IV Intermittent every 24 hours  levoFLOXacin IVPB      midodrine. 10 milliGRAM(s) Oral three times a day  norepinephrine Infusion 0.05 MICROgram(s)/kG/Min (2.63 mL/Hr) IV Continuous <Continuous>  nystatin Powder 1 Application(s) Topical two times a day  pantoprazole  Injectable 40 milliGRAM(s) IV Push two times a day  polyethylene glycol 3350 17 Gram(s) Oral daily  rifAXIMin 550 milliGRAM(s) Oral two times a day  thiamine IVPB 200 milliGRAM(s) IV Intermittent three times a day    MEDICATIONS  (PRN):  sodium chloride 0.9% lock flush 10 milliLiter(s) IV Push every 1 hour PRN Pre/post blood products, medications, blood draw, and to maintain line patency      RADIOLOGY & ADDITIONAL TESTS:    Imaging Personally Reviewed:  [ ] YES  [ ] NO    REVIEW OF SYSTEMS:  on vent       Consultant(s) Notes Reviewed:  [x] YES  [ ] NO    PHYSICAL EXAM:  GENERAL: NAD, well-groomed, well-developed  HEAD:  Atraumatic, Normocephalic  EYES: EOMI, PERRLA, conjunctiva and sclera clear  ENMT: No tonsillar erythema, exudates, or enlargement; Moist mucous membranes, Good dentition, No lesions  NECK: Supple, No JVD, Normal thyroid  NERVOUS SYSTEM:  on vent   CHEST/LUNG: Clear to percussion bilaterally; No rales, rhonchi, wheezing, or rubs  HEART: Regular rate and rhythm; No murmurs, rubs, or gallops  ABDOMEN: distended  EXTREMITIES:  2+ Peripheral Pulses, No clubbing, cyanosis, or edema  LYMPH: No lymphadenopathy noted  SKIN: No rashes or lesions    Care Discussed with Consultants/Other Providers [ x] YES  [ ] NO

## 2020-10-30 NOTE — PROGRESS NOTE ADULT - SUBJECTIVE AND OBJECTIVE BOX
Chief Complaint:  Patient is a 65y old  Female who presents with a chief complaint of Hypotension (30 Oct 2020 10:02)      Reason for consult: r/o Budd Chiari Interval Events:     Hospital Medications:  chlorhexidine 0.12% Liquid 15 milliLiter(s) Oral Mucosa every 12 hours  chlorhexidine 2% Cloths 1 Application(s) Topical daily  fentaNYL   Infusion. 1 MICROgram(s)/kG/Hr IV Continuous <Continuous>  lactulose Syrup 30 Gram(s) Oral every 6 hours  levoFLOXacin IVPB 750 milliGRAM(s) IV Intermittent every 24 hours  levoFLOXacin IVPB      midodrine. 10 milliGRAM(s) Oral three times a day  norepinephrine Infusion 0.05 MICROgram(s)/kG/Min IV Continuous <Continuous>  nystatin Powder 1 Application(s) Topical two times a day  pantoprazole  Injectable 40 milliGRAM(s) IV Push two times a day  polyethylene glycol 3350 17 Gram(s) Oral daily  rifAXIMin 550 milliGRAM(s) Oral two times a day  sodium chloride 0.9% lock flush 10 milliLiter(s) IV Push every 1 hour PRN  thiamine IVPB 200 milliGRAM(s) IV Intermittent three times a day      ROS:   Unable to obtain. Remains intubated on pressor. Opening eyes to verbal stimuli.    PHYSICAL EXAM:   Vital Signs:  Vital Signs Last 24 Hrs  T(C): 35.8 (30 Oct 2020 15:54), Max: 37.1 (30 Oct 2020 04:00)  T(F): 96.5 (30 Oct 2020 15:54), Max: 98.8 (30 Oct 2020 04:00)  HR: 101 (30 Oct 2020 15:15) (73 - 131)  BP: 93/70 (30 Oct 2020 15:15) (91/65 - 114/79)  BP(mean): 75 (30 Oct 2020 15:15) (71 - 93)  RR: 24 (30 Oct 2020 15:15) (18 - 36)  SpO2: 100% (30 Oct 2020 14:15) (85% - 100%)  Daily     Daily Weight in k.6 (30 Oct 2020 07:30)    GENERAL: critical  NEURO: opening eyes to verbal stimuli  HEENT: icteric sclera  CHEST: intubated on vent  CARDIAC: regular rate, rhythm  ABDOMEN: soft, non-tender, non-distended, no rebound or guarding, BS+  EXTREMITIES: warm, well perfused, no edema      LABS: reviewed                        8.4    8.37  )-----------( 32       ( 30 Oct 2020 06:30 )             24.9     10-30    146<H>  |  115<H>  |  10  ----------------------------<  114<H>  3.3<L>   |  23  |  1.56<H>    Ca    8.0<L>      30 Oct 2020 06:30  Phos  1.5     10-30  Mg     1.8     10-30    TPro  4.5<L>  /  Alb  2.6<L>  /  TBili  6.8<H>  /  DBili  4.0<H>  /  AST  124<H>  /  ALT  63<H>  /  AlkPhos  237<H>  1030    LIVER FUNCTIONS - ( 30 Oct 2020 06:30 )  Alb: 2.6 g/dL / Pro: 4.5 g/dL / ALK PHOS: 237 U/L / ALT: 63 U/L DA / AST: 124 U/L / GGT: x             Interval Diagnostic Studies: see sunrise for full report

## 2020-10-30 NOTE — PROGRESS NOTE ADULT - SUBJECTIVE AND OBJECTIVE BOX
OVERNIGHT EVENTS:    Present Symptoms:   Dyspnea:   Nausea/Vomiting:   Anxiety:    Depressed Mood:   Fatigue:   Loss of appetite:   Pain:                   location:   Constipation:  Review of Systems: [All others negative or Unable to obtain due to poor mentation]    MEDICATIONS  (STANDING):  chlorhexidine 0.12% Liquid 15 milliLiter(s) Oral Mucosa every 12 hours  chlorhexidine 2% Cloths 1 Application(s) Topical daily  fentaNYL   Infusion. 1 MICROgram(s)/kG/Hr (5.62 mL/Hr) IV Continuous <Continuous>  lactulose Syrup 30 Gram(s) Oral every 6 hours  levoFLOXacin IVPB 750 milliGRAM(s) IV Intermittent every 24 hours  levoFLOXacin IVPB      midodrine. 10 milliGRAM(s) Oral three times a day  norepinephrine Infusion 0.05 MICROgram(s)/kG/Min (2.63 mL/Hr) IV Continuous <Continuous>  nystatin Powder 1 Application(s) Topical two times a day  pantoprazole  Injectable 40 milliGRAM(s) IV Push two times a day  polyethylene glycol 3350 17 Gram(s) Oral daily  rifAXIMin 550 milliGRAM(s) Oral two times a day  thiamine IVPB 200 milliGRAM(s) IV Intermittent three times a day    MEDICATIONS  (PRN):  sodium chloride 0.9% lock flush 10 milliLiter(s) IV Push every 1 hour PRN Pre/post blood products, medications, blood draw, and to maintain line patency      PHYSICAL EXAM:  Vital Signs Last 24 Hrs  T(C): 35.8 (30 Oct 2020 15:54), Max: 37.1 (30 Oct 2020 04:00)  T(F): 96.5 (30 Oct 2020 15:54), Max: 98.8 (30 Oct 2020 04:00)  HR: 101 (30 Oct 2020 16:45) (73 - 131)  BP: 94/69 (30 Oct 2020 16:45) (89/69 - 114/79)  BP(mean): 75 (30 Oct 2020 16:45) (71 - 93)  RR: 24 (30 Oct 2020 16:45) (18 - 36)  SpO2: 100% (30 Oct 2020 16:45) (85% - 100%)     Karnofsky Performance Score/Palliative Performance Status Version2:    %  General:  HEENT: NC, sclera anicteric, neck supple  Lungs: unlabored  CV: normal  tachycardia  GI: soft, NT, ND  :    Musculoskeletal:    Skin: no rashes or lesions  Neuro:    Oral intake:    Diet: NPO     LABS:                          8.4    8.37  )-----------( 32       ( 30 Oct 2020 06:30 )             24.9     10-30    146<H>  |  115<H>  |  10  ----------------------------<  114<H>  3.3<L>   |  23  |  1.56<H>    Ca    8.0<L>      30 Oct 2020 06:30  Phos  1.5     10-30  Mg     1.8     10-30    TPro  4.5<L>  /  Alb  2.6<L>  /  TBili  6.8<H>  /  DBili  4.0<H>  /  AST  124<H>  /  ALT  63<H>  /  AlkPhos  237<H>  10-30        RADIOLOGY & ADDITIONAL STUDIES:    ADVANCE DIRECTIVES:     OVERNIGHT EVENTS: remains intubated, opens eyes. On 1 pressor and midodrine    Present Symptoms:      Review of Systems:   Unable to obtain due to poor mentation     MEDICATIONS  (STANDING):  chlorhexidine 0.12% Liquid 15 milliLiter(s) Oral Mucosa every 12 hours  chlorhexidine 2% Cloths 1 Application(s) Topical daily  fentaNYL   Infusion. 1 MICROgram(s)/kG/Hr (5.62 mL/Hr) IV Continuous <Continuous>  lactulose Syrup 30 Gram(s) Oral every 6 hours  levoFLOXacin IVPB 750 milliGRAM(s) IV Intermittent every 24 hours  levoFLOXacin IVPB      midodrine. 10 milliGRAM(s) Oral three times a day  norepinephrine Infusion 0.05 MICROgram(s)/kG/Min (2.63 mL/Hr) IV Continuous <Continuous>  nystatin Powder 1 Application(s) Topical two times a day  pantoprazole  Injectable 40 milliGRAM(s) IV Push two times a day  polyethylene glycol 3350 17 Gram(s) Oral daily  rifAXIMin 550 milliGRAM(s) Oral two times a day  thiamine IVPB 200 milliGRAM(s) IV Intermittent three times a day    MEDICATIONS  (PRN):  sodium chloride 0.9% lock flush 10 milliLiter(s) IV Push every 1 hour PRN Pre/post blood products, medications, blood draw, and to maintain line patency      PHYSICAL EXAM:  Vital Signs Last 24 Hrs  T(C): 35.8 (30 Oct 2020 15:54), Max: 37.1 (30 Oct 2020 04:00)  T(F): 96.5 (30 Oct 2020 15:54), Max: 98.8 (30 Oct 2020 04:00)  HR: 101 (30 Oct 2020 16:45) (73 - 131)  BP: 94/69 (30 Oct 2020 16:45) (89/69 - 114/79)  BP(mean): 75 (30 Oct 2020 16:45) (71 - 93)  RR: 24 (30 Oct 2020 16:45) (18 - 36)  SpO2: 100% (30 Oct 2020 16:45) (85% - 100%)     Karnofsky Performance Score/Palliative Performance Status Version2:  20  %  General: cachectic, opens eyes, intubated, ill appearing, NAD  HEENT: NC, sclera icteric, neck supple, thick secretions noted  Lungs: unlabored  CV: S1, S2  GI: soft, NT, ND   Musculoskeletal:  anasarca  Skin: no rashes or lesions  Neuro:  opens eyes, not following commands  Oral intake: npo/ TF       LABS:                          8.4    8.37  )-----------( 32       ( 30 Oct 2020 06:30 )             24.9     10-30    146<H>  |  115<H>  |  10  ----------------------------<  114<H>  3.3<L>   |  23  |  1.56<H>    Ca    8.0<L>      30 Oct 2020 06:30  Phos  1.5     10-30  Mg     1.8     10-30    TPro  4.5<L>  /  Alb  2.6<L>  /  TBili  6.8<H>  /  DBili  4.0<H>  /  AST  124<H>  /  ALT  63<H>  /  AlkPhos  237<H>  10-30        RADIOLOGY & ADDITIONAL STUDIES:    ADVANCE DIRECTIVES:

## 2020-10-31 NOTE — PROGRESS NOTE ADULT - ASSESSMENT
65y Female from home, lives with daughter, ambulates with walker with PMH of recurrent SBO's, s/p exp lap, SB resection in 2015, ex lap, ALBINA in 2018, DVT, PE, on Xarelto, IVC filter, chronic leg swelling, and anasarca, came in to the ED due to hypotension during office visit. Patient was found to be bacteremic with bacteroids fragilis. Admitted in ICU due to hypotension and hypothermia. On vancomycin and levo . BCx X2 negative. Sputum positive for MRSA and Stenotrophomonas.     Diagnosis:  >Encephalopathy  >Sepsis  >Bacteremia  >Anemia  >DVT/PE  >Transaminitis  >Aspiration pneumonia    --------------------------------------------Neuro--------------------------------------  -She is AAOx2 at baseline on evaluation  -Encephalopathic and got intubated on 10/24  -Ammonia trending down from 152 > 130 > 116 >103>131>126>90  -On lactulose 30g q 6h and rifaximin  -hypothermia has been resolved now  -INR>1.63>1.71>1.91  -Pt with multiorgan failure secondary to septic shock  -Hold CT head for now as AMS was most likely due to hepatic encephalopathy and pt didn't have any focal neurological deficit when she started having AMS  -her pupils are reactive to light b/l    -------------------------------------CVS-------------------------------  -Patient is hypotensive secondary septic shock  -On abx Levo, vanc stopped for high vanc trough, will trend vanc trough every morning until vanc trough goes below 20  -NG tube feeding  -Midodrine 10 mg TID  -RIJ for pressors on levophed went down from 0.07 to 0.05  -Hold Lasix home med in the setting of hypotension  -Pt is getting lasix and albumin on and off   -previous ECHO 3-4 months back showed normal EF but new ECHO showed EF 15-20% with severe left ventricular dysfunction  -pt with multiorgan dysfunction sec to septic shock , mixed venous gas from [facility] showed normal O2 saturation , less likely from the low EF   -f/u on autoimmune workup KATHY, anticardiolipin antibodies IgG IgM , ANCA  -Anticardiolipin abs +ve    --------------------------------Respiratory----------------------  -Intubated and sedated  -CT showed mild b/l pleural effusion and left lower lobe consolidation in ICU on day 2  -aspiration pneumonia  - sputum culture grew MRSA and Stenotrophomonas (sensitive to levo)  -On levo and held vanc due to high trough  -f/u repeat sputum culture  -ID Dr. Patricio    ----------------------------------------Gastrointestinal--------------------------------------  -Patient was bacteremic with bacteroids fragilis, likely secondary from intra abdominal source.   -Patient had multiple SBO in past. 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 Likely due to sepsis vs hepatic failure sec to budd Chiari  -BCx X 2 negative.  -ammonia trends down from 150 to 130 >116>103>131>126>90, on lactulose and rifaximin   -had 1BM this am, and 1 BM yesterday  -monitor BM  -GI, DR Chatterjee deferred doing colonoscopy due to multiple comorbidities, FOBT positive on 10/15  -Dr Potts recs has been noted, will repeat hepatitis panel     -----------------------------------------ID---------------------------------------  -Patient was bacteremic with bacteroids likely GI source.  -Lactate is normal 1.3 but trends up to 2.3 >3.7  -Procalcitonin 0.79  -On levo and held vanc due to high trough , will trend every day until it gets below 20  - BP on the lower side, pt is on Levophed baby dose and midodrine 10mg TID  -Monitor vitals Q4      ---------------------------------------------Nephro-------------------------------------  -Kidney function is normal, Cl is 120 with  sodium trends down today 147>150.152>152>151>148>147>142>146  -free water deficit of 500ml  -starts on 120 ml q6h  -TF  -urine lytes were sent this morning Fena was calculated 3.1,   -Monitor UO    ----------------------------------------Hem Onc----------------------------------  -Has h/o DVt and PE in past. Patient is on Xarelto at home  -Patient has anemia  -anemia of chronic disease  -Hgb 6.9 1PRBC >8.7 >7 1PRBC> 8.5>1 PRBC> 8.5>9.7>9.6>10> 9.5>8.9>8.4  -Plat trends down but trends up today [id number], no active bleeding  -Hold AC for now, will closely monitor the patient for any bleed  -Folate and B12 normal.   -Dr. Miguel aguilar has been noted, pt started on thiamine for 3 doses.     ----------------------------------------Endo--------------------------------  -HgbA1c 4  -Fs q6h    ----------------------------------------Prophylaxis----------------------------  DVT: hold Lovenox for low platelets   GI: PPI    ---------------------------------------GOC---------------------------  -DNR   -Pt with multi organ failure secondary to septic shock with poor prognosis  -Palliative is on board

## 2020-10-31 NOTE — PROGRESS NOTE ADULT - SUBJECTIVE AND OBJECTIVE BOX
Patient is seen and examined at the bed side, is Normothermic.  She is opening her eyes and remains on vent. and Low dose pressor. The Vancomycin level is therapeutic now, 20.1.      REVIEW OF SYSTEMS: Unable to obtain due to mental status         ALLERGIES: No Known Allergies        ICU Vital Signs Last 24 Hrs  T(C): 36.7 (31 Oct 2020 07:00), Max: 36.7 (31 Oct 2020 07:00)  T(F): 98.1 (31 Oct 2020 07:00), Max: 98.1 (31 Oct 2020 07:00)  HR: 96 (31 Oct 2020 15:00) (86 - 110)  BP: 93/70 (31 Oct 2020 15:00) (88/67 - 105/80)  BP(mean): 76 (31 Oct 2020 15:00) (71 - 86)  ABP: --  ABP(mean): --  RR: 27 (31 Oct 2020 15:00) (22 - 28)  SpO2: 97% (31 Oct 2020 15:00) (95% - 100%)        PHYSICAL EXAM:  GENERAL: Intubated /vented,   CHEST/LUNG: Not using accessory muscles   HEART: s1 and s2 present  ABDOMEN:  Nontender and  Nondistended  EXTREMITIES: No pedal  edema  CNS: Intubated/vented          LABS:                                   8.1    7.15  )-----------( 45       ( 31 Oct 2020 06:54 )             24.5                8.4    8.37  )-----------( 32       ( 30 Oct 2020 06:30 )             24.9         10-31    143  |  112<H>  |  12  ----------------------------<  103<H>  3.9   |  23  |  1.73<H>    Ca    8.0<L>      31 Oct 2020 06:54  Phos  2.2     10-31  Mg     2.0     10-31    TPro  4.7<L>  /  Alb  2.4<L>  /  TBili  6.2<H>  /  DBili  x   /  AST  114<H>  /  ALT  61<H>  /  AlkPhos  245<H>  10-31      10-30    146<H>  |  115<H>  |  10  ----------------------------<  114<H>  3.3<L>   |  23  |  1.56<H>    Ca    8.0<L>      30 Oct 2020 06:30  Phos  1.5     10-30  Mg     1.8     10-30    TPro  4.5<L>  /  Alb  2.6<L>  /  TBili  6.8<H>  /  DBili  4.0<H>  /  AST  124<H>  /  ALT  63<H>  /  AlkPhos  237<H>  10-30      Vancomycin Level, Trough (10.31.20 @ 06:54)   Vancomycin Level, Trough: 20.5:    Vancomycin Level, Trough (10.30.20 @ 06:30)   Vancomycin Level, Trough: 28.1:    Vancomycin Level, Trough (10.29.20 @ 05:11)   Vancomycin Level, Trough: 36.3    Procalcitonin, Serum (10.15.20 @ 11:48)   Procalcitonin, Serum: 0.16: Procalcitonin (PCT) Interpretation (ng/mL) - Diagnosis of systemic   bacterial infection/sepsis         MEDICATIONS  (STANDING):    chlorhexidine 0.12% Liquid 15 milliLiter(s) Oral Mucosa every 12 hours  chlorhexidine 2% Cloths 1 Application(s) Topical daily  fentaNYL   Infusion. 0.5 MICROgram(s)/kG/Hr (2.81 mL/Hr) IV Continuous <Continuous>  lactulose Syrup 30 Gram(s) Oral every 6 hours  levoFLOXacin IVPB 750 milliGRAM(s) IV Intermittent every 24 hours  levoFLOXacin IVPB      midodrine. 10 milliGRAM(s) Oral three times a day  nystatin Powder 1 Application(s) Topical two times a day  pantoprazole  Injectable 40 milliGRAM(s) IV Push two times a day  polyethylene glycol 3350 17 Gram(s) Oral daily  rifAXIMin 550 milliGRAM(s) Oral two times a day  thiamine IVPB 200 milliGRAM(s) IV Intermittent three times a day      RADIOLOGY & ADDITIONAL TESTS:    10/25/20: Xray Chest 1 View- PORTABLE-Routine (Xray Chest 1 View- PORTABLE-Routine in AM.) (10.25.20 @ 09:21) Frontal expiratory view of the chest shows the heart to be similar in size. Endotracheal tube, right jugular line and feeding tube remain present.    The lungs show similar left upper lobe infiltrate with progression of right perihilar infiltrate. Pleural effusions are similar. There is no evidence of pneumothorax.      10/23/20 : Xray Chest 1 View-PORTABLE IMMEDIATE (Xray Chest 1 View-PORTABLE IMMEDIATE .) (10.23.20 @ 19:09) Feeding tube tip near GE junction as noted. Questionable right upper lobe infiltrate. Follow-up study is recommended as clinically warranted.      10/21/20 : CT Abdomen and Pelvis w/ Oral Cont and w/ IV Cont (10.21.20 @ 15:59) Mural thickening of the left colon and rectum. Liquid stool in the colon. Apparent segmental mural thickening of the mid to distal small bowel and the proximal duodenum. Findings may represent nonspecific enterocolitis, noninfectious inflammatory bowel disease, or ischemic bowel. Clinical correlation is recommended. The celiac axis artery, SMA, and KINA are patent without stenosis.    Dilatation of the mid small bowel is again noted. Oral contrast has reached the terminal ileum. Findings may represent small bowel obstruction, or ileus related to nonspecific enterocolitis.   Cholelithiasis.    Moderate to large ascites in the abdomen, increased since the previous examination. 5 mm nonspecific noncalcified left upper lobe lung nodule; if the patient's is in the high risk category (i.e. smoker), follow-up chest CT may be pursued in 12 months to ensure stability.    Combination of atelectasis and consolidation in the left lower lobe.    Possible 1.0 cm hypodense lesion in the left lobe of the thyroid. Thyroid ultrasound may be pursued for further evaluation.   Mild bilateral pleural effusions.    Aging determinate compression fracture at T5 vertebra.        10/13/20 : CT Abdomen and Pelvis w/ IV Cont (10.13.20 @ 18:50) Diffuse dilatation of small bowel loops without a clear transition is slightly increased, likely an ileus.  Decreased moderate ascites.    1.5 cm pancreatic versus peripancreatic soft tissue nodule    10/13/20 : Xray Chest 1 View- PORTABLE-Urgent (Xray Chest 1 View- PORTABLE-Urgent .) (10.13.20 @ 16:34) Clear lungs.          MICROBIOLOGY DATA:    Culture - Sputum . (10.26.20 @ 00:26)   - Ampicillin/Sulbactam: R <=8/4   - Cefazolin: R >16   - Ceftazidime: I 16   - Clindamycin: R >4   - Erythromycin: R >4   - Gentamicin: S <=1 Should not be used as monotherapy   - Levofloxacin: S <=0.5   - Linezolid: S 2   - Oxacillin: R >2   - Penicillin: R >8   - RIF- Rifampin: S <=1 Should not be used as monotherapy   - Tetra/Doxy: S <=1   - Trimethoprim/Sulfamethoxazole: S <=0.5/9.5   - Trimethoprim/Sulfamethoxazole: S <=0.5/9.5   - Vancomycin: S 1   Gram Stain:  Moderate polymorphonuclear leukocytes per low power field   Rare Squamous epithelial cells per low power field   Moderate Gram Positive Cocci in Clusters per oil power field   Moderate Yeast per oil power field   Specimen Source: .Sputum Sputum   Culture Results:  Moderate Methicillin resistant Staphylococcus aureus   Moderate Stenotrophomonas maltophilia   Normal Respiratory Erin present   Organism Identification: Methicillin resistant Staphylococcus aureus   Stenotrophomonas maltophilia   Organism: Methicillin resistant Staphylococcus aureus   Organism: Stenotrophomonas maltophilia       MRSA/MSSA PCR (10.21.20 @ 09:41)   MRSA PCR Result.: Detected:       Culture - Blood (10.20.20 @ 22:09)   Specimen Source: .Blood Blood   Culture Results:  No growth to date.     Culture - Blood (10.20.20 @ 22:09)   Specimen Source: .Blood Blood   Culture Results:   No growth to date.     Culture - Blood in AM (10.16.20 @ 10:13)   Specimen Source: .Blood Blood-Peripheral   Culture Results:   No growth to date.     Culture - Blood in AM (10.16.20 @ 10:13)   Specimen Source: .Blood Blood-Peripheral   Culture Results:   No growth to date.     Culture - Blood (10.13.20 @ 22:23)   Growth in anaerobic bottle: Gram Negative Rods   Specimen Source: .Blood Blood-Peripheral   Organism: Blood Culture PCR   Culture Results:   Growth in anaerobic bottle: Bacteroides fragilis   "Susceptibilities not performed"     Culture - Blood (10.13.20 @ 22:23)   Specimen Source: .Blood Blood-Peripheral   Culture Results:   No growth to date.     Urine Microscopic-Add On (NC) (10.13.20 @ 21:39)   Bacteria: Moderate /HPF   Epithelial Cells: Moderate /HPF   Red Blood Cell - Urine: 5-10 /HPF   White Blood Cell - Urine: 6-10 /HPF                Patient is seen and examined at the bed side, is Normothermic.  She is opening her eyes and remains on vent. BUT  OFF pressor since 8 AM.  The Vancomycin level is therapeutic now, 20.5.      REVIEW OF SYSTEMS: Unable to obtain due to mental status         ALLERGIES: No Known Allergies        ICU Vital Signs Last 24 Hrs  T(C): 36.7 (31 Oct 2020 07:00), Max: 36.7 (31 Oct 2020 07:00)  T(F): 98.1 (31 Oct 2020 07:00), Max: 98.1 (31 Oct 2020 07:00)  HR: 96 (31 Oct 2020 15:00) (86 - 110)  BP: 93/70 (31 Oct 2020 15:00) (88/67 - 105/80)  BP(mean): 76 (31 Oct 2020 15:00) (71 - 86)  ABP: --  ABP(mean): --  RR: 27 (31 Oct 2020 15:00) (22 - 28)  SpO2: 97% (31 Oct 2020 15:00) (95% - 100%)        PHYSICAL EXAM:  GENERAL: Intubated /vented,   CHEST/LUNG: Not using accessory muscles   HEART: s1 and s2 present  ABDOMEN:  Nontender and  Nondistended  EXTREMITIES: B/L UE edematous  CNS: Intubated/vented          LABS:                                   8.1    7.15  )-----------( 45       ( 31 Oct 2020 06:54 )             24.5                8.4    8.37  )-----------( 32       ( 30 Oct 2020 06:30 )             24.9         10-31    143  |  112<H>  |  12  ----------------------------<  103<H>  3.9   |  23  |  1.73<H>    Ca    8.0<L>      31 Oct 2020 06:54  Phos  2.2     10-31  Mg     2.0     10-31    TPro  4.7<L>  /  Alb  2.4<L>  /  TBili  6.2<H>  /  DBili  x   /  AST  114<H>  /  ALT  61<H>  /  AlkPhos  245<H>  10-31      10-30    146<H>  |  115<H>  |  10  ----------------------------<  114<H>  3.3<L>   |  23  |  1.56<H>    Ca    8.0<L>      30 Oct 2020 06:30  Phos  1.5     10-30  Mg     1.8     10-30    TPro  4.5<L>  /  Alb  2.6<L>  /  TBili  6.8<H>  /  DBili  4.0<H>  /  AST  124<H>  /  ALT  63<H>  /  AlkPhos  237<H>  10-30      Vancomycin Level, Trough (10.31.20 @ 06:54)   Vancomycin Level, Trough: 20.5:    Vancomycin Level, Trough (10.30.20 @ 06:30)   Vancomycin Level, Trough: 28.1:    Vancomycin Level, Trough (10.29.20 @ 05:11)   Vancomycin Level, Trough: 36.3    Procalcitonin, Serum (10.15.20 @ 11:48)   Procalcitonin, Serum: 0.16: Procalcitonin (PCT) Interpretation (ng/mL) - Diagnosis of systemic   bacterial infection/sepsis         MEDICATIONS  (STANDING):    chlorhexidine 0.12% Liquid 15 milliLiter(s) Oral Mucosa every 12 hours  chlorhexidine 2% Cloths 1 Application(s) Topical daily  fentaNYL   Infusion. 0.5 MICROgram(s)/kG/Hr (2.81 mL/Hr) IV Continuous <Continuous>  lactulose Syrup 30 Gram(s) Oral every 6 hours  levoFLOXacin IVPB 750 milliGRAM(s) IV Intermittent every 24 hours  levoFLOXacin IVPB      midodrine. 10 milliGRAM(s) Oral three times a day  nystatin Powder 1 Application(s) Topical two times a day  pantoprazole  Injectable 40 milliGRAM(s) IV Push two times a day  polyethylene glycol 3350 17 Gram(s) Oral daily  rifAXIMin 550 milliGRAM(s) Oral two times a day  thiamine IVPB 200 milliGRAM(s) IV Intermittent three times a day      RADIOLOGY & ADDITIONAL TESTS:    10/25/20: Xray Chest 1 View- PORTABLE-Routine (Xray Chest 1 View- PORTABLE-Routine in AM.) (10.25.20 @ 09:21) Frontal expiratory view of the chest shows the heart to be similar in size. Endotracheal tube, right jugular line and feeding tube remain present.    The lungs show similar left upper lobe infiltrate with progression of right perihilar infiltrate. Pleural effusions are similar. There is no evidence of pneumothorax.      10/23/20 : Xray Chest 1 View-PORTABLE IMMEDIATE (Xray Chest 1 View-PORTABLE IMMEDIATE .) (10.23.20 @ 19:09) Feeding tube tip near GE junction as noted. Questionable right upper lobe infiltrate. Follow-up study is recommended as clinically warranted.      10/21/20 : CT Abdomen and Pelvis w/ Oral Cont and w/ IV Cont (10.21.20 @ 15:59) Mural thickening of the left colon and rectum. Liquid stool in the colon. Apparent segmental mural thickening of the mid to distal small bowel and the proximal duodenum. Findings may represent nonspecific enterocolitis, noninfectious inflammatory bowel disease, or ischemic bowel. Clinical correlation is recommended. The celiac axis artery, SMA, and KINA are patent without stenosis.    Dilatation of the mid small bowel is again noted. Oral contrast has reached the terminal ileum. Findings may represent small bowel obstruction, or ileus related to nonspecific enterocolitis.   Cholelithiasis.    Moderate to large ascites in the abdomen, increased since the previous examination. 5 mm nonspecific noncalcified left upper lobe lung nodule; if the patient's is in the high risk category (i.e. smoker), follow-up chest CT may be pursued in 12 months to ensure stability.    Combination of atelectasis and consolidation in the left lower lobe.    Possible 1.0 cm hypodense lesion in the left lobe of the thyroid. Thyroid ultrasound may be pursued for further evaluation.   Mild bilateral pleural effusions.    Aging determinate compression fracture at T5 vertebra.        10/13/20 : CT Abdomen and Pelvis w/ IV Cont (10.13.20 @ 18:50) Diffuse dilatation of small bowel loops without a clear transition is slightly increased, likely an ileus.  Decreased moderate ascites.    1.5 cm pancreatic versus peripancreatic soft tissue nodule    10/13/20 : Xray Chest 1 View- PORTABLE-Urgent (Xray Chest 1 View- PORTABLE-Urgent .) (10.13.20 @ 16:34) Clear lungs.          MICROBIOLOGY DATA:    Culture - Sputum . (10.26.20 @ 00:26)   - Ampicillin/Sulbactam: R <=8/4   - Cefazolin: R >16   - Ceftazidime: I 16   - Clindamycin: R >4   - Erythromycin: R >4   - Gentamicin: S <=1 Should not be used as monotherapy   - Levofloxacin: S <=0.5   - Linezolid: S 2   - Oxacillin: R >2   - Penicillin: R >8   - RIF- Rifampin: S <=1 Should not be used as monotherapy   - Tetra/Doxy: S <=1   - Trimethoprim/Sulfamethoxazole: S <=0.5/9.5   - Trimethoprim/Sulfamethoxazole: S <=0.5/9.5   - Vancomycin: S 1   Gram Stain:  Moderate polymorphonuclear leukocytes per low power field   Rare Squamous epithelial cells per low power field   Moderate Gram Positive Cocci in Clusters per oil power field   Moderate Yeast per oil power field   Specimen Source: .Sputum Sputum   Culture Results:  Moderate Methicillin resistant Staphylococcus aureus   Moderate Stenotrophomonas maltophilia   Normal Respiratory Erin present   Organism Identification: Methicillin resistant Staphylococcus aureus   Stenotrophomonas maltophilia   Organism: Methicillin resistant Staphylococcus aureus   Organism: Stenotrophomonas maltophilia       MRSA/MSSA PCR (10.21.20 @ 09:41)   MRSA PCR Result.: Detected:       Culture - Blood (10.20.20 @ 22:09)   Specimen Source: .Blood Blood   Culture Results:  No growth to date.     Culture - Blood (10.20.20 @ 22:09)   Specimen Source: .Blood Blood   Culture Results:   No growth to date.     Culture - Blood in AM (10.16.20 @ 10:13)   Specimen Source: .Blood Blood-Peripheral   Culture Results:   No growth to date.     Culture - Blood in AM (10.16.20 @ 10:13)   Specimen Source: .Blood Blood-Peripheral   Culture Results:   No growth to date.     Culture - Blood (10.13.20 @ 22:23)   Growth in anaerobic bottle: Gram Negative Rods   Specimen Source: .Blood Blood-Peripheral   Organism: Blood Culture PCR   Culture Results:   Growth in anaerobic bottle: Bacteroides fragilis   "Susceptibilities not performed"     Culture - Blood (10.13.20 @ 22:23)   Specimen Source: .Blood Blood-Peripheral   Culture Results:   No growth to date.     Urine Microscopic-Add On (NC) (10.13.20 @ 21:39)   Bacteria: Moderate /HPF   Epithelial Cells: Moderate /HPF   Red Blood Cell - Urine: 5-10 /HPF   White Blood Cell - Urine: 6-10 /HPF

## 2020-10-31 NOTE — PROGRESS NOTE ADULT - SUBJECTIVE AND OBJECTIVE BOX
Chief Complaint:  Patient is a 65y old  Female who presents with a chief complaint of Hypotension (31 Oct 2020 17:15)      Reason for consult: r/o Budd Chiari Interval Events:   Patient remains intubated. Afebrile. Opening eyese.     Hospital Medications:  chlorhexidine 0.12% Liquid 15 milliLiter(s) Oral Mucosa every 12 hours  chlorhexidine 2% Cloths 1 Application(s) Topical daily  fentaNYL   Infusion. 0.5 MICROgram(s)/kG/Hr IV Continuous <Continuous>  lactulose Syrup 30 Gram(s) Oral every 6 hours  levoFLOXacin IVPB 750 milliGRAM(s) IV Intermittent every 24 hours  levoFLOXacin IVPB      midodrine. 10 milliGRAM(s) Oral three times a day  nystatin Powder 1 Application(s) Topical two times a day  pantoprazole  Injectable 40 milliGRAM(s) IV Push two times a day  polyethylene glycol 3350 17 Gram(s) Oral daily  rifAXIMin 550 milliGRAM(s) Oral two times a day  sodium chloride 0.9% lock flush 10 milliLiter(s) IV Push every 1 hour PRN  thiamine IVPB 200 milliGRAM(s) IV Intermittent three times a day  vancomycin  IVPB          ROS:   Unable to obtain due to patient condition    PHYSICAL EXAM:   Vital Signs:  Vital Signs Last 24 Hrs  T(C): 36.7 (31 Oct 2020 17:00), Max: 36.7 (31 Oct 2020 07:00)  T(F): 98 (31 Oct 2020 17:00), Max: 98.1 (31 Oct 2020 07:00)  HR: 100 (31 Oct 2020 19:00) (86 - 110)  BP: 98/67 (31 Oct 2020 19:00) (88/67 - 105/80)  BP(mean): 75 (31 Oct 2020 19:00) (70 - 86)  RR: 30 (31 Oct 2020 19:00) (22 - 30)  SpO2: 97% (31 Oct 2020 19:00) (95% - 100%)  Daily     Daily       NEURO: opening eyes  CHEST: intubated, on vent  CARDIAC: regular rate, rhythm  ABDOMEN: soft, non-tender, non-distended, no rebound or guarding  EXTREMITIES: no edema      LABS: reviewed                        8.1    7.15  )-----------( 45       ( 31 Oct 2020 06:54 )             24.5     10-31    143  |  112<H>  |  12  ----------------------------<  103<H>  3.9   |  23  |  1.73<H>    Ca    8.0<L>      31 Oct 2020 06:54  Phos  2.2     10-31  Mg     2.0     10-31    TPro  4.7<L>  /  Alb  2.4<L>  /  TBili  6.2<H>  /  DBili  x   /  AST  114<H>  /  ALT  61<H>  /  AlkPhos  245<H>  10-31    LIVER FUNCTIONS - ( 31 Oct 2020 06:54 )  Alb: 2.4 g/dL / Pro: 4.7 g/dL / ALK PHOS: 245 U/L / ALT: 61 U/L DA / AST: 114 U/L / GGT: x             Interval Diagnostic Studies: see sunrise for full report

## 2020-10-31 NOTE — PROGRESS NOTE ADULT - ASSESSMENT
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 Repeat CT 10/21 suggested  non-specific enterocolitis, noninfectious inflammatory bowel disease or ischemic bowel along with ileus, and concern for SBO given mild luminal narrowing at distal small bowel. Patient remained hypotensive, hypothermic, requiring pressor support, septic and became encephalopathic and got intubated on 10/24, also diagnosed with HCAP vs. aspirational PNA, sputum growing MRSA and Stenotrophomonas maltophilia.  She was switched to levofloxacin on 10/27 and now on levo plus vancomycin. 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 Liver enzymes were /are up-trending, along with bilirubin, was suspected due to ongoing sepsis. Patient also noted with cardiomyopathy with EF 10-15%.     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 (as per d/w primary team, it was non-conclusive)  - No caudate lobe hypertrophy reported (present in 75% of Budd Chiari cases), no macroregenerative nodules reported, characteristic pattern of parenchymal perfusion not described,, but study reported limited due to motion artifact   - Venography would be gold standard for diagnosis, discussed earlier with IR, if patient will be more stable (possibly outpatient), can be [facility] at [facility] for Dx and potential intervention, this time patient remains septic on pressor support  - was already on full dose anticoagulation, was being held b/o coagulopathy and now b/o severe thrombocytopenia  - portal vein and IVC were patent on recent imaging  - can attempt repeat Doppler US at bedside to attempt to visualize hepatic vein again and/or collaterals  - anticardiolipin antibody pos 1x (prior negative 1x)     # Abnormal liver enzymes with hyperbilirubinemia and coagulopathy, now MOF  # Ascites  - Elevated liver enzymes partially due to ongoing sepsis (only mildly elevated or normal on admission and started to worsen when patient condition deteriorated, became hypotensive, hypothermic; AST 46-28-...248...-104-114; ALT 70-45...105-..67-61; ALP  113 -245; Se bi 0.6-1.4-...5.5-6.2)  - Mgmt. of sepsis per ICU  - Also noted severely reduced LVEF (10-15%) and RV dysfunction, thus hepatic congestion can also contribute (had nl EF in 5/2020)  - Dx paracentesis could be useful (last 2 sets of BCx nl) but as d/w IR, no safe window based on last CT  - Consider repeat US abd bedside  - bilirubin still uptrending  - Given that still up-trending bilirubin, and coagulopathy, can consider re-sending full liver workup including viral hep panel, EBV, CMV, HSV, VZV, hep E, autoimmune workup, ceruloplasmin (prior liver workup: KATHY neg, AMA neg, HBsAg neg, HBcAb IgM neg, HAV IgM neg)  - Consider checking Factor VIII  - prior ascites analysis from 2019: ascites albumin< 0.2, protein < 1; serum albumin 1.0; SAAG< 1.1 (early Budd Chiari would usually give SAAG > 1.1, total protein > 2.5, and in late Budd Chiari SAAG> 1.1 and total protein < 2.5)    # Encephalopathy, possibly multifactorial  - c/w lactulose and c/w rifaximin   - consider CT head (was on full dose AC)      # Anemia, thrombocytopenia   - Hematology following, appreciated  - FOBT pos, GI f/u      Will continue to follow  D/w primary team  Thank you for the consult

## 2020-10-31 NOTE — PROGRESS NOTE ADULT - SUBJECTIVE AND OBJECTIVE BOX
INTERVAL HPI/OVERNIGHT EVENTS:   Vent: 24/375/40/5  ABGs: 7.42/35/144/22    Plat 48>43>32>29>23>32  Lovenox held given low plt   will f/u Vanc trough in am , restart as indicated   will Monitor for bleeding  f/u autoimmune workup      PRESSORS: [ ] YES [ ] NO  WHICH:    ANTIBIOTICS:                  DATE STARTED:  ANTIBIOTICS:                  DATE STARTED:  ANTIBIOTICS:                  DATE STARTED:    Antimicrobial:  levoFLOXacin IVPB 750 milliGRAM(s) IV Intermittent every 24 hours  levoFLOXacin IVPB      rifAXIMin 550 milliGRAM(s) Oral two times a day    Cardiovascular:  midodrine. 10 milliGRAM(s) Oral three times a day  norepinephrine Infusion 0.05 MICROgram(s)/kG/Min IV Continuous <Continuous>    Pulmonary:    Hematalogic:    Other:  chlorhexidine 0.12% Liquid 15 milliLiter(s) Oral Mucosa every 12 hours  chlorhexidine 2% Cloths 1 Application(s) Topical daily  fentaNYL   Infusion. 1 MICROgram(s)/kG/Hr IV Continuous <Continuous>  lactulose Syrup 30 Gram(s) Oral every 6 hours  nystatin Powder 1 Application(s) Topical two times a day  pantoprazole  Injectable 40 milliGRAM(s) IV Push two times a day  polyethylene glycol 3350 17 Gram(s) Oral daily  sodium chloride 0.9% lock flush 10 milliLiter(s) IV Push every 1 hour PRN  thiamine IVPB 200 milliGRAM(s) IV Intermittent three times a day    chlorhexidine 0.12% Liquid 15 milliLiter(s) Oral Mucosa every 12 hours  chlorhexidine 2% Cloths 1 Application(s) Topical daily  fentaNYL   Infusion. 1 MICROgram(s)/kG/Hr IV Continuous <Continuous>  lactulose Syrup 30 Gram(s) Oral every 6 hours  levoFLOXacin IVPB 750 milliGRAM(s) IV Intermittent every 24 hours  levoFLOXacin IVPB      midodrine. 10 milliGRAM(s) Oral three times a day  norepinephrine Infusion 0.05 MICROgram(s)/kG/Min IV Continuous <Continuous>  nystatin Powder 1 Application(s) Topical two times a day  pantoprazole  Injectable 40 milliGRAM(s) IV Push two times a day  polyethylene glycol 3350 17 Gram(s) Oral daily  rifAXIMin 550 milliGRAM(s) Oral two times a day  sodium chloride 0.9% lock flush 10 milliLiter(s) IV Push every 1 hour PRN  thiamine IVPB 200 milliGRAM(s) IV Intermittent three times a day    Drug Dosing Weight  Height (cm): 167.6 (21 Oct 2020 02:26)  Weight (kg): 56.2 (21 Oct 2020 02:26)  BMI (kg/m2): 20 (21 Oct 2020 02:26)  BSA (m2): 1.63 (21 Oct 2020 02:26)    CENTRAL LINE: [ ] YES [ ] NO  LOCATION:         TREJO: [ ] YES [ ] NO          A-LINE:  [ ] YES [ ] NO  LOCATION:             ICU Vital Signs Last 24 Hrs  T(C): 36.1 (31 Oct 2020 00:08), Max: 37.1 (30 Oct 2020 04:00)  T(F): 97 (31 Oct 2020 00:08), Max: 98.8 (30 Oct 2020 04:00)  HR: 94 (31 Oct 2020 02:00) (73 - 118)  BP: 94/70 (31 Oct 2020 02:00) (89/69 - 114/79)  BP(mean): 76 (31 Oct 2020 02:00) (71 - 88)  ABP: --  ABP(mean): --  RR: 25 (31 Oct 2020 02:00) (18 - 35)  SpO2: 100% (31 Oct 2020 02:00) (87% - 100%)      ABG - ( 30 Oct 2020 04:08 )  pH, Arterial: 7.42  pH, Blood: x     /  pCO2: 35    /  pO2: 144   / HCO3: 22    / Base Excess: -1.3  /  SaO2: 98                    10-29 @ 07:01  -  10-30 @ 07:00  --------------------------------------------------------  IN: 2054.1 mL / OUT: 1705 mL / NET: 349.1 mL        Mode: AC/ CMV (Assist Control/ Continuous Mandatory Ventilation)  RR (machine): 24  TV (machine): 375  FiO2: 50  PEEP: 5  ITime: 1  MAP: 11  PIP: 26      PHYSICAL EXAM:    GENERAL: Intubated and sedated  HEAD:  Atraumatic, Normocephalic  EYES: b/l pupil reactive to light.   NECK: Supple, normal appearance, No JVD; Normal thyroid; Trachea midline  NERVOUS SYSTEM:  Alert & Oriented X0  CHEST/LUNG: equal b/l air entry and rhonchi on auscultation  HEART: Regular rate and rhythm; No murmurs, rubs, or gallops  ABDOMEN: Soft, Nontender, mildly distended; Bowel sounds present  EXTREMITIES:  2+ Peripheral Pulses, No clubbing, cyanosis, or edema  LYMPH: No lymphadenopathy noted  SKIN: No rashes or lesions; Good capillary refill        LABS:  CBC Full  -  ( 30 Oct 2020 06:30 )  WBC Count : 8.37 K/uL  RBC Count : 2.62 M/uL  Hemoglobin : 8.4 g/dL  Hematocrit : 24.9 %  Platelet Count - Automated : 32 K/uL  Mean Cell Volume : 95.0 fl  Mean Cell Hemoglobin : 32.1 pg  Mean Cell Hemoglobin Concentration : 33.7 gm/dL  Auto Neutrophil # : 6.90 K/uL  Auto Lymphocyte # : 0.70 K/uL  Auto Monocyte # : 0.62 K/uL  Auto Eosinophil # : 0.06 K/uL  Auto Basophil # : 0.02 K/uL  Auto Neutrophil % : 82.5 %  Auto Lymphocyte % : 8.4 %  Auto Monocyte % : 7.4 %  Auto Eosinophil % : 0.7 %  Auto Basophil % : 0.2 %    10-30    146<H>  |  115<H>  |  10  ----------------------------<  114<H>  3.3<L>   |  23  |  1.56<H>    Ca    8.0<L>      30 Oct 2020 06:30  Phos  1.5     10-30  Mg     1.8     10-30    TPro  4.5<L>  /  Alb  2.6<L>  /  TBili  6.8<H>  /  DBili  4.0<H>  /  AST  124<H>  /  ALT  63<H>  /  AlkPhos  237<H>  10-30    PT/INR - ( 29 Oct 2020 05:11 )   PT: 22.8 sec;   INR: 1.98 ratio         PTT - ( 29 Oct 2020 05:11 )  PTT:56.5 sec        RADIOLOGY & ADDITIONAL STUDIES REVIEWED:  ***    [ ]GOALS OF CARE DISCUSSION WITH PATIENT/FAMILY/PROXY:    CRITICAL CARE TIME SPENT: 35 minutes INTERVAL HPI/OVERNIGHT EVENTS:   Vent: 24/375/40/5  ABGs: 7.42/35/144/22    Plat 48>43>32>29>23>32  Lovenox held given low plt   will f/u Vanc trough in am , restart as indicated   will Monitor for bleeding  f/u autoimmune workup      PRESSORS: [ ] YES [ ] NO  WHICH:    ANTIBIOTICS:                  DATE STARTED:  ANTIBIOTICS:                  DATE STARTED:  ANTIBIOTICS:                  DATE STARTED:    Antimicrobial:  levoFLOXacin IVPB 750 milliGRAM(s) IV Intermittent every 24 hours  levoFLOXacin IVPB      rifAXIMin 550 milliGRAM(s) Oral two times a day    Cardiovascular:  midodrine. 10 milliGRAM(s) Oral three times a day  norepinephrine Infusion 0.05 MICROgram(s)/kG/Min IV Continuous <Continuous>    Pulmonary:    Hematalogic:    Other:  chlorhexidine 0.12% Liquid 15 milliLiter(s) Oral Mucosa every 12 hours  chlorhexidine 2% Cloths 1 Application(s) Topical daily  fentaNYL   Infusion. 1 MICROgram(s)/kG/Hr IV Continuous <Continuous>  lactulose Syrup 30 Gram(s) Oral every 6 hours  nystatin Powder 1 Application(s) Topical two times a day  pantoprazole  Injectable 40 milliGRAM(s) IV Push two times a day  polyethylene glycol 3350 17 Gram(s) Oral daily  sodium chloride 0.9% lock flush 10 milliLiter(s) IV Push every 1 hour PRN  thiamine IVPB 200 milliGRAM(s) IV Intermittent three times a day    chlorhexidine 0.12% Liquid 15 milliLiter(s) Oral Mucosa every 12 hours  chlorhexidine 2% Cloths 1 Application(s) Topical daily  fentaNYL   Infusion. 1 MICROgram(s)/kG/Hr IV Continuous <Continuous>  lactulose Syrup 30 Gram(s) Oral every 6 hours  levoFLOXacin IVPB 750 milliGRAM(s) IV Intermittent every 24 hours  levoFLOXacin IVPB      midodrine. 10 milliGRAM(s) Oral three times a day  norepinephrine Infusion 0.05 MICROgram(s)/kG/Min IV Continuous <Continuous>  nystatin Powder 1 Application(s) Topical two times a day  pantoprazole  Injectable 40 milliGRAM(s) IV Push two times a day  polyethylene glycol 3350 17 Gram(s) Oral daily  rifAXIMin 550 milliGRAM(s) Oral two times a day  sodium chloride 0.9% lock flush 10 milliLiter(s) IV Push every 1 hour PRN  thiamine IVPB 200 milliGRAM(s) IV Intermittent three times a day    Drug Dosing Weight  Height (cm): 167.6 (21 Oct 2020 02:26)  Weight (kg): 56.2 (21 Oct 2020 02:26)  BMI (kg/m2): 20 (21 Oct 2020 02:26)  BSA (m2): 1.63 (21 Oct 2020 02:26)    CENTRAL LINE: [x ] YES [ ] NO  LOCATION:         TREJO: [x ] YES [ ] NO          A-LINE:  [ ] YES [ ] NO  LOCATION:             ICU Vital Signs Last 24 Hrs  T(C): 36.1 (31 Oct 2020 00:08), Max: 37.1 (30 Oct 2020 04:00)  T(F): 97 (31 Oct 2020 00:08), Max: 98.8 (30 Oct 2020 04:00)  HR: 94 (31 Oct 2020 02:00) (73 - 118)  BP: 94/70 (31 Oct 2020 02:00) (89/69 - 114/79)  BP(mean): 76 (31 Oct 2020 02:00) (71 - 88)  ABP: --  ABP(mean): --  RR: 25 (31 Oct 2020 02:00) (18 - 35)  SpO2: 100% (31 Oct 2020 02:00) (87% - 100%)      ABG - ( 30 Oct 2020 04:08 )  pH, Arterial: 7.42  pH, Blood: x     /  pCO2: 35    /  pO2: 144   / HCO3: 22    / Base Excess: -1.3  /  SaO2: 98                    10-29 @ 07:01  -  10-30 @ 07:00  --------------------------------------------------------  IN: 2054.1 mL / OUT: 1705 mL / NET: 349.1 mL        Mode: AC/ CMV (Assist Control/ Continuous Mandatory Ventilation)  RR (machine): 24  TV (machine): 375  FiO2: 50  PEEP: 5  ITime: 1  MAP: 11  PIP: 26      PHYSICAL EXAM:    GENERAL: Intubated and sedated + ETT  HEAD:  Atraumatic, Normocephalic  EYES: b/l pupil reactive to light.   NECK: Supple, normal appearance, No JVD; Normal thyroid; Trachea midline  NERVOUS SYSTEM:  Alert & Oriented X0  CHEST/LUNG: equal b/l air entry and rhonchi on auscultation  HEART: Regular rate and rhythm; No murmurs, rubs, or gallops  ABDOMEN: Soft, Nontender, mildly distended; Bowel sounds present  EXTREMITIES:  2+ Peripheral Pulses, No clubbing, cyanosis, or edema  LYMPH: No lymphadenopathy noted  SKIN: No rashes or lesions; Good capillary refill        LABS:  CBC Full  -  ( 30 Oct 2020 06:30 )  WBC Count : 8.37 K/uL  RBC Count : 2.62 M/uL  Hemoglobin : 8.4 g/dL  Hematocrit : 24.9 %  Platelet Count - Automated : 32 K/uL  Mean Cell Volume : 95.0 fl  Mean Cell Hemoglobin : 32.1 pg  Mean Cell Hemoglobin Concentration : 33.7 gm/dL  Auto Neutrophil # : 6.90 K/uL  Auto Lymphocyte # : 0.70 K/uL  Auto Monocyte # : 0.62 K/uL  Auto Eosinophil # : 0.06 K/uL  Auto Basophil # : 0.02 K/uL  Auto Neutrophil % : 82.5 %  Auto Lymphocyte % : 8.4 %  Auto Monocyte % : 7.4 %  Auto Eosinophil % : 0.7 %  Auto Basophil % : 0.2 %    10-30    146<H>  |  115<H>  |  10  ----------------------------<  114<H>  3.3<L>   |  23  |  1.56<H>    Ca    8.0<L>      30 Oct 2020 06:30  Phos  1.5     10-30  Mg     1.8     10-30    TPro  4.5<L>  /  Alb  2.6<L>  /  TBili  6.8<H>  /  DBili  4.0<H>  /  AST  124<H>  /  ALT  63<H>  /  AlkPhos  237<H>  10-30    PT/INR - ( 29 Oct 2020 05:11 )   PT: 22.8 sec;   INR: 1.98 ratio         PTT - ( 29 Oct 2020 05:11 )  PTT:56.5 sec        RADIOLOGY & ADDITIONAL STUDIES REVIEWED:  ***      CXR:  < from: Xray Chest 1 View- PORTABLE-Routine (Xray Chest 1 View- PORTABLE-Routine in AM.) (10.31.20 @ 10:06) >    EXAM:  XR CHEST PORTABLE ROUTINE 1V                            PROCEDURE DATE:  10/31/2020          INTERPRETATION:  Portable chest radiograph    CLINICAL INFORMATION:   Septic shock    TECHNIQUE:  Portable  AP view of the chest was obtained.    COMPARISON: 10/31/2012 available for review.    FINDINGS: ET tube tip above tracheal bifurcation.  NG tube tip beyond GE junction.  RIGHT IJ catheter tip in SVC.    The lungs show persistent bilateral lobe perihilar and basilar airspace disease and/or bilateral effusions. No significant change. No pneumothorax.    The heart and mediastinum are within normal limits.    Visualized osseous structures are intact.        IMPRESSION:   No interval change..                KANDI CORBETT MD; Attending Radiologist  This document has been electronically signed. Oct 31 2020 12:50PM    < end of copied text >  [ ]GOALS OF CARE DISCUSSION WITH PATIENT/FAMILY/PROXY:    CRITICAL CARE TIME SPENT: 35 minutes

## 2020-10-31 NOTE — PROGRESS NOTE ADULT - ASSESSMENT
as per icu     65y Female from home, lives with daughter, ambulates with walker with PMH of recurrent SBO's, s/p exp lap, SB resection in 2015, ex lap, ALBINA in 2018, DVT, PE, on Xarelto, IVC filter, chronic leg swelling, and anasarca, came in to the ED due to hypotension during office visit. Patient was found to be bacteremic with bacteroids fragilis. Admitted in ICU due to hypotension and hypothermia. On vancomycin and levo . BCx X2 negative. Sputum positive for MRSA and Stenotrophomonas.     Diagnosis:  >Encephalopathy  >Sepsis  >Bacteremia  >Anemia  >DVT/PE  >Transaminitis  >Aspiration pneumonia    --------------------------------------------Neuro--------------------------------------  -She is AAOx2 at baseline on evaluation  -Encephalopathic and got intubated on 10/24  -Ammonia trending down from 152 > 130 > 116 >103>131>126>90  -On lactulose 30g q 6h and rifaximin  -hypothermia has been resolved now  -INR>1.63>1.71>1.91  -Pt with multiorgan failure secondary to septic shock  -Hold CT head for now as AMS was most likely due to hepatic encephalopathy and pt didn't have any focal neurological deficit when she started having AMS  -her pupils are reactive to light b/l    -------------------------------------CVS-------------------------------  -Patient is hypotensive secondary septic shock  -On abx Levo, vanc stopped for high vanc trough, will trend vanc trough every morning until vanc trough goes below 20  -NG tube feeding  -Midodrine 10 mg TID  -RIJ for pressors on levophed went down from 0.07 to 0.05  -Hold Lasix home med in the setting of hypotension  -Pt is getting lasix and albumin on and off   -previous ECHO 3-4 months back showed normal EF but new ECHO showed EF 15-20% with severe left ventricular dysfunction  -pt with multiorgan dysfunction sec to septic shock , mixed venous gas from [facility] showed normal O2 saturation , less likely from the low EF   -f/u on autoimmune workup KATHY, anticardiolipin antibodies IgG IgM , ANCA  -Anticardiolipin abs +ve    --------------------------------Respiratory----------------------  -Intubated and sedated  -CT showed mild b/l pleural effusion and left lower lobe consolidation in ICU on day 2  -aspiration pneumonia  - sputum culture grew MRSA and Stenotrophomonas (sensitive to levo)  -On levo and held vanc due to high trough  -f/u repeat sputum culture  -ID Dr. Patricio    ----------------------------------------Gastrointestinal--------------------------------------  -Patient was bacteremic with bacteroids fragilis, likely secondary from intra abdominal source.   -Patient had multiple SBO in past. 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 Likely due to sepsis vs hepatic failure sec to budd Chiari  -BCx X 2 negative.  -ammonia trends down from 150 to 130 >116>103>131>126>90, on lactulose and rifaximin   -had 1BM this am, and 1 BM yesterday  -monitor BM  -GI, DR Chatterjee deferred doing colonoscopy due to multiple comorbidities, FOBT positive on 10/15  -Dr Potts recs has been noted, will repeat hepatitis panel     -----------------------------------------ID---------------------------------------  -Patient was bacteremic with bacteroids likely GI source.  -Lactate is normal 1.3 but trends up to 2.3 >3.7  -Procalcitonin 0.79  -On levo and held vanc due to high trough , will trend every day until it gets below 20  - BP on the lower side, pt is on Levophed baby dose and midodrine 10mg TID  -Monitor vitals Q4      ---------------------------------------------Nephro-------------------------------------  -Kidney function is normal, Cl is 120 with  sodium trends down today 147>150.152>152>151>148>147>142>146  -free water deficit of 500ml  -starts on 120 ml q6h  -TF  -urine lytes were sent this morning Fena was calculated 3.1,   -Monitor UO    ----------------------------------------Hem Onc----------------------------------  -Has h/o DVt and PE in past. Patient is on Xarelto at home  -Patient has anemia  -anemia of chronic disease  -Hgb 6.9 1PRBC >8.7 >7 1PRBC> 8.5>1 PRBC> 8.5>9.7>9.6>10> 9.5>8.9>8.4  -Plat trends down but trends up today [id number], no active bleeding  -Hold AC for now, will closely monitor the patient for any bleed  -Folate and B12 normal.   -Dr. Miguel aguilar has been noted, pt started on thiamine for 3 doses.     ----------------------------------------Endo--------------------------------  -HgbA1c 4  -Fs q6h    ----------------------------------------Prophylaxis----------------------------  DVT: hold Lovenox for low platelets   GI: PPI    ---------------------------------------GOC---------------------------  -DNR   -Pt with multi organ failure secondary to septic shock with poor prognosis  -Palliative is on board

## 2020-10-31 NOTE — PROGRESS NOTE ADULT - SUBJECTIVE AND OBJECTIVE BOX
Patient was seen and examined  Patient is a 65y old  Female who presents with a chief complaint of Hypotension (31 Oct 2020 03:11)      INTERVAL HPI/OVERNIGHT EVENTS:  T(C): 36.7 (10-31-20 @ 07:00), Max: 36.7 (10-31-20 @ 07:00)  HR: 91 (10-31-20 @ 10:15) (86 - 110)  BP: 92/68 (10-31-20 @ 10:15) (88/67 - 105/80)  RR: 25 (10-31-20 @ 10:15) (22 - 28)  SpO2: 100% (10-31-20 @ 10:15) (95% - 100%)  Wt(kg): --  I&O's Summary    30 Oct 2020 07:01  -  31 Oct 2020 07:00  --------------------------------------------------------  IN: 1576 mL / OUT: 810 mL / NET: 766 mL    31 Oct 2020 08:01  -  31 Oct 2020 11:24  --------------------------------------------------------  IN: 58 mL / OUT: 120 mL / NET: -62 mL        LABS:                        8.1    7.15  )-----------( 45       ( 31 Oct 2020 06:54 )             24.5     10-31    143  |  112<H>  |  12  ----------------------------<  103<H>  3.9   |  23  |  1.73<H>    Ca    8.0<L>      31 Oct 2020 06:54  Phos  2.2     10-31  Mg     2.0     10-31    TPro  4.7<L>  /  Alb  2.4<L>  /  TBili  6.2<H>  /  DBili  x   /  AST  114<H>  /  ALT  61<H>  /  AlkPhos  245<H>  10-31        CAPILLARY BLOOD GLUCOSE      POCT Blood Glucose.: 124 mg/dL (30 Oct 2020 23:11)  POCT Blood Glucose.: 99 mg/dL (30 Oct 2020 17:23)  POCT Blood Glucose.: 102 mg/dL (30 Oct 2020 12:13)    LIPID PANEL  Cholesterol --  LDL --  HDL --  RATIO HDL/Total Cholesterol --  Triglyceride 87  LIPID PANEL  Cholesterol --  LDL --  HDL --  RATIO HDL/Total Cholesterol --  Triglyceride 69  LIPID PANEL  Cholesterol --  LDL --  HDL --  RATIO HDL/Total Cholesterol --  Triglyceride 59      ABG - ( 31 Oct 2020 03:42 )  pH, Arterial: 7.36  pH, Blood: x     /  pCO2: 42    /  pO2: 232   / HCO3: 23    / Base Excess: -1.6  /  SaO2: 99                    MEDICATIONS  (STANDING):  chlorhexidine 0.12% Liquid 15 milliLiter(s) Oral Mucosa every 12 hours  chlorhexidine 2% Cloths 1 Application(s) Topical daily  fentaNYL   Infusion. 0.5 MICROgram(s)/kG/Hr (2.81 mL/Hr) IV Continuous <Continuous>  lactulose Syrup 30 Gram(s) Oral every 6 hours  levoFLOXacin IVPB 750 milliGRAM(s) IV Intermittent every 24 hours  levoFLOXacin IVPB      midodrine. 10 milliGRAM(s) Oral three times a day  nystatin Powder 1 Application(s) Topical two times a day  pantoprazole  Injectable 40 milliGRAM(s) IV Push two times a day  polyethylene glycol 3350 17 Gram(s) Oral daily  rifAXIMin 550 milliGRAM(s) Oral two times a day  thiamine IVPB 200 milliGRAM(s) IV Intermittent three times a day    MEDICATIONS  (PRN):  sodium chloride 0.9% lock flush 10 milliLiter(s) IV Push every 1 hour PRN Pre/post blood products, medications, blood draw, and to maintain line patency      RADIOLOGY & ADDITIONAL TESTS:    Imaging Personally Reviewed:  [ ] YES  [ ] NO    REVIEW OF SYSTEMS:  on vent       Consultant(s) Notes Reviewed:  [ ] YES  [ ] NO    PHYSICAL EXAM:  GENERAL: NAD, well-groomed, well-developed  HEAD:  Atraumatic, Normocephalic  EYES: EOMI, PERRLA, conjunctiva and sclera clear  ENMT: No tonsillar erythema, exudates, or enlargement; Moist mucous membranes, Good dentition, No lesions  NECK: Supple, No JVD, Normal thyroid  NERVOUS SYSTEM:  on vent   CHEST/LUNG: bl rhonchi   HEART: Regular rate and rhythm; No murmurs, rubs, or gallops  ABDOMEN: Soft, Nontender, Nondistended; Bowel sounds present  EXTREMITIES:  2+ Peripheral Pulses, No clubbing, cyanosis, or edema  LYMPH: No lymphadenopathy noted  SKIN: No rashes or lesions    Care Discussed with Consultants/Other Providers [ x] YES  [ ] NO

## 2020-11-01 NOTE — PROGRESS NOTE ADULT - SUBJECTIVE AND OBJECTIVE BOX
Patient is seen and examined at the bed side, is Normothermic.  She is opening her eyes and remains on vent. BUT  OFF pressor since 8 AM.  The Vancomycin level is therapeutic now, 20.5.      REVIEW OF SYSTEMS: Unable to obtain due to mental status         ALLERGIES: No Known Allergies        ICU Vital Signs Last 24 Hrs  T(C): 37 (01 Nov 2020 14:30), Max: 37.7 (01 Nov 2020 05:00)  T(F): 98.6 (01 Nov 2020 14:30), Max: 99.9 (01 Nov 2020 05:00)  HR: 103 (01 Nov 2020 16:24) (100 - 116)  BP: 97/71 (01 Nov 2020 16:00) (88/59 - 109/79)  BP(mean): 77 (01 Nov 2020 16:00) (41 - 86)  ABP: --  ABP(mean): --  RR: 39 (01 Nov 2020 16:00) (26 - 39)  SpO2: 95% (01 Nov 2020 16:24) (91% - 99%)        PHYSICAL EXAM:  GENERAL: Intubated /vented,   CHEST/LUNG: Not using accessory muscles   HEART: s1 and s2 present  ABDOMEN:  Nontender and  Nondistended  EXTREMITIES: B/L UE edematous  CNS: Intubated/vented          LABS:                          8.2    7.99  )-----------( 60       ( 01 Nov 2020 16:47 )             24.5                                  8.1    7.15  )-----------( 45       ( 31 Oct 2020 06:54 )             24.5         11-01    140  |  109<H>  |  17  ----------------------------<  113<H>  4.2   |  24  |  1.85<H>    Ca    7.8<L>      01 Nov 2020 06:34  Phos  2.6     11-01  Mg     1.9     11-01    TPro  5.0<L>  /  Alb  2.6<L>  /  TBili  5.6<H>  /  DBili  x   /  AST  123<H>  /  ALT  62<H>  /  AlkPhos  316<H>  11-01    10-31    143  |  112<H>  |  12  ----------------------------<  103<H>  3.9   |  23  |  1.73<H>    Ca    8.0<L>      31 Oct 2020 06:54  Phos  2.2     10-31  Mg     2.0     10-31    TPro  4.7<L>  /  Alb  2.4<L>  /  TBili  6.2<H>  /  DBili  x   /  AST  114<H>  /  ALT  61<H>  /  AlkPhos  245<H>  10-31      vVancomycin Level, Trough (11.01.20 @ 04:59)   Vancomycin Level, Trough: 20.9:    Vancomycin Level, Trough (10.31.20 @ 06:54)   Vancomycin Level, Trough: 20.5:    Vancomycin Level, Trough (10.30.20 @ 06:30)   Vancomycin Level, Trough: 28.1:    Vancomycin Level, Trough (10.29.20 @ 05:11)   Vancomycin Level, Trough: 36.3    Procalcitonin, Serum (10.15.20 @ 11:48)   Procalcitonin, Serum: 0.16: Procalcitonin (PCT) Interpretation (ng/mL) - Diagnosis of systemic   bacterial infection/sepsis         MEDICATIONS  (STANDING):  chlorhexidine 0.12% Liquid 15 milliLiter(s) Oral Mucosa every 12 hours  chlorhexidine 2% Cloths 1 Application(s) Topical daily  fentaNYL   Infusion. 0.5 MICROgram(s)/kG/Hr (2.81 mL/Hr) IV Continuous <Continuous>  lactulose Syrup 30 Gram(s) Oral every 6 hours  levoFLOXacin IVPB 750 milliGRAM(s) IV Intermittent every 24 hours  levoFLOXacin IVPB      midodrine. 10 milliGRAM(s) Oral three times a day  nystatin Powder 1 Application(s) Topical two times a day  pantoprazole  Injectable 40 milliGRAM(s) IV Push two times a day  polyethylene glycol 3350 17 Gram(s) Oral daily  rifAXIMin 550 milliGRAM(s) Oral two times a day  vancomycin  IVPB      vancomycin  IVPB 750 milliGRAM(s) IV Intermittent every 24 hours      RADIOLOGY & ADDITIONAL TESTS:    10/25/20: Xray Chest 1 View- PORTABLE-Routine (Xray Chest 1 View- PORTABLE-Routine in AM.) (10.25.20 @ 09:21) Frontal expiratory view of the chest shows the heart to be similar in size. Endotracheal tube, right jugular line and feeding tube remain present.    The lungs show similar left upper lobe infiltrate with progression of right perihilar infiltrate. Pleural effusions are similar. There is no evidence of pneumothorax.      10/23/20 : Xray Chest 1 View-PORTABLE IMMEDIATE (Xray Chest 1 View-PORTABLE IMMEDIATE .) (10.23.20 @ 19:09) Feeding tube tip near GE junction as noted. Questionable right upper lobe infiltrate. Follow-up study is recommended as clinically warranted.      10/21/20 : CT Abdomen and Pelvis w/ Oral Cont and w/ IV Cont (10.21.20 @ 15:59) Mural thickening of the left colon and rectum. Liquid stool in the colon. Apparent segmental mural thickening of the mid to distal small bowel and the proximal duodenum. Findings may represent nonspecific enterocolitis, noninfectious inflammatory bowel disease, or ischemic bowel. Clinical correlation is recommended. The celiac axis artery, SMA, and KINA are patent without stenosis.    Dilatation of the mid small bowel is again noted. Oral contrast has reached the terminal ileum. Findings may represent small bowel obstruction, or ileus related to nonspecific enterocolitis.   Cholelithiasis.    Moderate to large ascites in the abdomen, increased since the previous examination. 5 mm nonspecific noncalcified left upper lobe lung nodule; if the patient's is in the high risk category (i.e. smoker), follow-up chest CT may be pursued in 12 months to ensure stability.    Combination of atelectasis and consolidation in the left lower lobe.    Possible 1.0 cm hypodense lesion in the left lobe of the thyroid. Thyroid ultrasound may be pursued for further evaluation.   Mild bilateral pleural effusions.    Aging determinate compression fracture at T5 vertebra.        10/13/20 : CT Abdomen and Pelvis w/ IV Cont (10.13.20 @ 18:50) Diffuse dilatation of small bowel loops without a clear transition is slightly increased, likely an ileus.  Decreased moderate ascites.    1.5 cm pancreatic versus peripancreatic soft tissue nodule    10/13/20 : Xray Chest 1 View- PORTABLE-Urgent (Xray Chest 1 View- PORTABLE-Urgent .) (10.13.20 @ 16:34) Clear lungs.          MICROBIOLOGY DATA:    Culture - Sputum . (10.26.20 @ 00:26)   - Ampicillin/Sulbactam: R <=8/4   - Cefazolin: R >16   - Ceftazidime: I 16   - Clindamycin: R >4   - Erythromycin: R >4   - Gentamicin: S <=1 Should not be used as monotherapy   - Levofloxacin: S <=0.5   - Linezolid: S 2   - Oxacillin: R >2   - Penicillin: R >8   - RIF- Rifampin: S <=1 Should not be used as monotherapy   - Tetra/Doxy: S <=1   - Trimethoprim/Sulfamethoxazole: S <=0.5/9.5   - Trimethoprim/Sulfamethoxazole: S <=0.5/9.5   - Vancomycin: S 1   Gram Stain:  Moderate polymorphonuclear leukocytes per low power field   Rare Squamous epithelial cells per low power field   Moderate Gram Positive Cocci in Clusters per oil power field   Moderate Yeast per oil power field   Specimen Source: .Sputum Sputum   Culture Results:  Moderate Methicillin resistant Staphylococcus aureus   Moderate Stenotrophomonas maltophilia   Normal Respiratory Erin present   Organism Identification: Methicillin resistant Staphylococcus aureus   Stenotrophomonas maltophilia   Organism: Methicillin resistant Staphylococcus aureus   Organism: Stenotrophomonas maltophilia       MRSA/MSSA PCR (10.21.20 @ 09:41)   MRSA PCR Result.: Detected:       Culture - Blood (10.20.20 @ 22:09)   Specimen Source: .Blood Blood   Culture Results:  No growth to date.     Culture - Blood (10.20.20 @ 22:09)   Specimen Source: .Blood Blood   Culture Results:   No growth to date.     Culture - Blood in AM (10.16.20 @ 10:13)   Specimen Source: .Blood Blood-Peripheral   Culture Results:   No growth to date.     Culture - Blood in AM (10.16.20 @ 10:13)   Specimen Source: .Blood Blood-Peripheral   Culture Results:   No growth to date.     Culture - Blood (10.13.20 @ 22:23)   Growth in anaerobic bottle: Gram Negative Rods   Specimen Source: .Blood Blood-Peripheral   Organism: Blood Culture PCR   Culture Results:   Growth in anaerobic bottle: Bacteroides fragilis   "Susceptibilities not performed"     Culture - Blood (10.13.20 @ 22:23)   Specimen Source: .Blood Blood-Peripheral   Culture Results:   No growth to date.     Urine Microscopic-Add On (NC) (10.13.20 @ 21:39)   Bacteria: Moderate /HPF   Epithelial Cells: Moderate /HPF   Red Blood Cell - Urine: 5-10 /HPF   White Blood Cell - Urine: 6-10 /HPF              Patient is seen and examined at the bed side, is Normothermic.  She remains on vent. and OFF pressor.   The Vancomycin level is therapeutic now, 20.9.      REVIEW OF SYSTEMS: Unable to obtain due to mental status         ALLERGIES: No Known Allergies        ICU Vital Signs Last 24 Hrs  T(C): 37 (01 Nov 2020 14:30), Max: 37.7 (01 Nov 2020 05:00)  T(F): 98.6 (01 Nov 2020 14:30), Max: 99.9 (01 Nov 2020 05:00)  HR: 103 (01 Nov 2020 16:24) (100 - 116)  BP: 97/71 (01 Nov 2020 16:00) (88/59 - 109/79)  BP(mean): 77 (01 Nov 2020 16:00) (41 - 86)  ABP: --  ABP(mean): --  RR: 39 (01 Nov 2020 16:00) (26 - 39)  SpO2: 95% (01 Nov 2020 16:24) (91% - 99%)        PHYSICAL EXAM:  GENERAL: Intubated /vented,   CHEST/LUNG: Not using accessory muscles   HEART: s1 and s2 present  ABDOMEN:  Nontender and  Nondistended  EXTREMITIES: B/L UE edematous  CNS: Intubated/vented          LABS:                          8.2    7.99  )-----------( 60       ( 01 Nov 2020 16:47 )             24.5                                  8.1    7.15  )-----------( 45       ( 31 Oct 2020 06:54 )             24.5         11-01    140  |  109<H>  |  17  ----------------------------<  113<H>  4.2   |  24  |  1.85<H>    Ca    7.8<L>      01 Nov 2020 06:34  Phos  2.6     11-01  Mg     1.9     11-01    TPro  5.0<L>  /  Alb  2.6<L>  /  TBili  5.6<H>  /  DBili  x   /  AST  123<H>  /  ALT  62<H>  /  AlkPhos  316<H>  11-01    10-31    143  |  112<H>  |  12  ----------------------------<  103<H>  3.9   |  23  |  1.73<H>    Ca    8.0<L>      31 Oct 2020 06:54  Phos  2.2     10-31  Mg     2.0     10-31    TPro  4.7<L>  /  Alb  2.4<L>  /  TBili  6.2<H>  /  DBili  x   /  AST  114<H>  /  ALT  61<H>  /  AlkPhos  245<H>  10-31      vVancomycin Level, Trough (11.01.20 @ 04:59)   Vancomycin Level, Trough: 20.9:    Vancomycin Level, Trough (10.31.20 @ 06:54)   Vancomycin Level, Trough: 20.5:    Vancomycin Level, Trough (10.30.20 @ 06:30)   Vancomycin Level, Trough: 28.1:    Vancomycin Level, Trough (10.29.20 @ 05:11)   Vancomycin Level, Trough: 36.3    Procalcitonin, Serum (10.15.20 @ 11:48)   Procalcitonin, Serum: 0.16: Procalcitonin (PCT) Interpretation (ng/mL) - Diagnosis of systemic   bacterial infection/sepsis         MEDICATIONS  (STANDING):  chlorhexidine 0.12% Liquid 15 milliLiter(s) Oral Mucosa every 12 hours  chlorhexidine 2% Cloths 1 Application(s) Topical daily  fentaNYL   Infusion. 0.5 MICROgram(s)/kG/Hr (2.81 mL/Hr) IV Continuous <Continuous>  lactulose Syrup 30 Gram(s) Oral every 6 hours  levoFLOXacin IVPB 750 milliGRAM(s) IV Intermittent every 24 hours  levoFLOXacin IVPB      midodrine. 10 milliGRAM(s) Oral three times a day  nystatin Powder 1 Application(s) Topical two times a day  pantoprazole  Injectable 40 milliGRAM(s) IV Push two times a day  polyethylene glycol 3350 17 Gram(s) Oral daily  rifAXIMin 550 milliGRAM(s) Oral two times a day  vancomycin  IVPB      vancomycin  IVPB 750 milliGRAM(s) IV Intermittent every 24 hours      RADIOLOGY & ADDITIONAL TESTS:    10/25/20: Xray Chest 1 View- PORTABLE-Routine (Xray Chest 1 View- PORTABLE-Routine in AM.) (10.25.20 @ 09:21) Frontal expiratory view of the chest shows the heart to be similar in size. Endotracheal tube, right jugular line and feeding tube remain present.    The lungs show similar left upper lobe infiltrate with progression of right perihilar infiltrate. Pleural effusions are similar. There is no evidence of pneumothorax.      10/23/20 : Xray Chest 1 View-PORTABLE IMMEDIATE (Xray Chest 1 View-PORTABLE IMMEDIATE .) (10.23.20 @ 19:09) Feeding tube tip near GE junction as noted. Questionable right upper lobe infiltrate. Follow-up study is recommended as clinically warranted.      10/21/20 : CT Abdomen and Pelvis w/ Oral Cont and w/ IV Cont (10.21.20 @ 15:59) Mural thickening of the left colon and rectum. Liquid stool in the colon. Apparent segmental mural thickening of the mid to distal small bowel and the proximal duodenum. Findings may represent nonspecific enterocolitis, noninfectious inflammatory bowel disease, or ischemic bowel. Clinical correlation is recommended. The celiac axis artery, SMA, and KINA are patent without stenosis.    Dilatation of the mid small bowel is again noted. Oral contrast has reached the terminal ileum. Findings may represent small bowel obstruction, or ileus related to nonspecific enterocolitis.   Cholelithiasis.    Moderate to large ascites in the abdomen, increased since the previous examination. 5 mm nonspecific noncalcified left upper lobe lung nodule; if the patient's is in the high risk category (i.e. smoker), follow-up chest CT may be pursued in 12 months to ensure stability.    Combination of atelectasis and consolidation in the left lower lobe.    Possible 1.0 cm hypodense lesion in the left lobe of the thyroid. Thyroid ultrasound may be pursued for further evaluation.   Mild bilateral pleural effusions.    Aging determinate compression fracture at T5 vertebra.        10/13/20 : CT Abdomen and Pelvis w/ IV Cont (10.13.20 @ 18:50) Diffuse dilatation of small bowel loops without a clear transition is slightly increased, likely an ileus.  Decreased moderate ascites.    1.5 cm pancreatic versus peripancreatic soft tissue nodule    10/13/20 : Xray Chest 1 View- PORTABLE-Urgent (Xray Chest 1 View- PORTABLE-Urgent .) (10.13.20 @ 16:34) Clear lungs.          MICROBIOLOGY DATA:    Culture - Sputum . (10.26.20 @ 00:26)   - Ampicillin/Sulbactam: R <=8/4   - Cefazolin: R >16   - Ceftazidime: I 16   - Clindamycin: R >4   - Erythromycin: R >4   - Gentamicin: S <=1 Should not be used as monotherapy   - Levofloxacin: S <=0.5   - Linezolid: S 2   - Oxacillin: R >2   - Penicillin: R >8   - RIF- Rifampin: S <=1 Should not be used as monotherapy   - Tetra/Doxy: S <=1   - Trimethoprim/Sulfamethoxazole: S <=0.5/9.5   - Trimethoprim/Sulfamethoxazole: S <=0.5/9.5   - Vancomycin: S 1   Gram Stain:  Moderate polymorphonuclear leukocytes per low power field   Rare Squamous epithelial cells per low power field   Moderate Gram Positive Cocci in Clusters per oil power field   Moderate Yeast per oil power field   Specimen Source: .Sputum Sputum   Culture Results:  Moderate Methicillin resistant Staphylococcus aureus   Moderate Stenotrophomonas maltophilia   Normal Respiratory Erin present   Organism Identification: Methicillin resistant Staphylococcus aureus   Stenotrophomonas maltophilia   Organism: Methicillin resistant Staphylococcus aureus   Organism: Stenotrophomonas maltophilia       MRSA/MSSA PCR (10.21.20 @ 09:41)   MRSA PCR Result.: Detected:       Culture - Blood (10.20.20 @ 22:09)   Specimen Source: .Blood Blood   Culture Results:  No growth to date.     Culture - Blood (10.20.20 @ 22:09)   Specimen Source: .Blood Blood   Culture Results:   No growth to date.     Culture - Blood in AM (10.16.20 @ 10:13)   Specimen Source: .Blood Blood-Peripheral   Culture Results:   No growth to date.     Culture - Blood in AM (10.16.20 @ 10:13)   Specimen Source: .Blood Blood-Peripheral   Culture Results:   No growth to date.     Culture - Blood (10.13.20 @ 22:23)   Growth in anaerobic bottle: Gram Negative Rods   Specimen Source: .Blood Blood-Peripheral   Organism: Blood Culture PCR   Culture Results:   Growth in anaerobic bottle: Bacteroides fragilis   "Susceptibilities not performed"     Culture - Blood (10.13.20 @ 22:23)   Specimen Source: .Blood Blood-Peripheral   Culture Results:   No growth to date.     Urine Microscopic-Add On (NC) (10.13.20 @ 21:39)   Bacteria: Moderate /HPF   Epithelial Cells: Moderate /HPF   Red Blood Cell - Urine: 5-10 /HPF   White Blood Cell - Urine: 6-10 /HPF

## 2020-11-01 NOTE — PROGRESS NOTE ADULT - SUBJECTIVE AND OBJECTIVE BOX
INTERVAL HPI/OVERNIGHT EVENTS: ***    PRESSORS: [ ] YES [ ] NO  WHICH:    ANTIBIOTICS:                      Antimicrobial:  levoFLOXacin IVPB 750 milliGRAM(s) IV Intermittent every 24 hours  levoFLOXacin IVPB      rifAXIMin 550 milliGRAM(s) Oral two times a day  vancomycin  IVPB      vancomycin  IVPB 750 milliGRAM(s) IV Intermittent every 24 hours    Cardiovascular:  midodrine. 10 milliGRAM(s) Oral three times a day    Pulmonary:    Hematalogic:    Other:  chlorhexidine 0.12% Liquid 15 milliLiter(s) Oral Mucosa every 12 hours  chlorhexidine 2% Cloths 1 Application(s) Topical daily  fentaNYL   Infusion. 0.5 MICROgram(s)/kG/Hr IV Continuous <Continuous>  lactulose Syrup 30 Gram(s) Oral every 6 hours  nystatin Powder 1 Application(s) Topical two times a day  pantoprazole  Injectable 40 milliGRAM(s) IV Push two times a day  polyethylene glycol 3350 17 Gram(s) Oral daily  sodium chloride 0.9% lock flush 10 milliLiter(s) IV Push every 1 hour PRN  thiamine IVPB 200 milliGRAM(s) IV Intermittent three times a day    chlorhexidine 0.12% Liquid 15 milliLiter(s) Oral Mucosa every 12 hours  chlorhexidine 2% Cloths 1 Application(s) Topical daily  fentaNYL   Infusion. 0.5 MICROgram(s)/kG/Hr IV Continuous <Continuous>  lactulose Syrup 30 Gram(s) Oral every 6 hours  levoFLOXacin IVPB 750 milliGRAM(s) IV Intermittent every 24 hours  levoFLOXacin IVPB      midodrine. 10 milliGRAM(s) Oral three times a day  nystatin Powder 1 Application(s) Topical two times a day  pantoprazole  Injectable 40 milliGRAM(s) IV Push two times a day  polyethylene glycol 3350 17 Gram(s) Oral daily  rifAXIMin 550 milliGRAM(s) Oral two times a day  sodium chloride 0.9% lock flush 10 milliLiter(s) IV Push every 1 hour PRN  thiamine IVPB 200 milliGRAM(s) IV Intermittent three times a day  vancomycin  IVPB      vancomycin  IVPB 750 milliGRAM(s) IV Intermittent every 24 hours    Drug Dosing Weight  Height (cm): 167.6 (21 Oct 2020 02:26)  Weight (kg): 56.2 (21 Oct 2020 02:26)  BMI (kg/m2): 20 (21 Oct 2020 02:26)  BSA (m2): 1.63 (21 Oct 2020 02:26)    CENTRAL LINE: [ ] YES [ ] NO  LOCATION:   DATE INSERTED:  REMOVE: [ ] YES [ ] NO  EXPLAIN:    TREJO: [ ] YES [ ] NO    DATE INSERTED:  REMOVE:  [ ] YES [ ] NO  EXPLAIN:    A-LINE:  [ ] YES [ ] NO  LOCATION:   DATE INSERTED:  REMOVE:  [ ] YES [ ] NO  EXPLAIN:    PMH -reviewed admission note, no change since admission    ICU Vital Signs Last 24 Hrs  T(C): 37.3 (31 Oct 2020 23:00), Max: 37.3 (31 Oct 2020 23:00)  T(F): 99.2 (31 Oct 2020 23:00), Max: 99.2 (31 Oct 2020 23:00)  HR: 109 (01 Nov 2020 00:06) (86 - 110)  BP: 93/68 (01 Nov 2020 00:00) (88/67 - 105/80)  BP(mean): 73 (01 Nov 2020 00:00) (70 - 86)  ABP: --  ABP(mean): --  RR: 26 (01 Nov 2020 00:00) (22 - 31)  SpO2: 95% (01 Nov 2020 00:06) (94% - 100%)      ABG - ( 31 Oct 2020 03:42 )  pH, Arterial: 7.36  pH, Blood: x     /  pCO2: 42    /  pO2: 232   / HCO3: 23    / Base Excess: -1.6  /  SaO2: 99                    10-30 @ 07:01  -  10-31 @ 07:00  --------------------------------------------------------  IN: 1576 mL / OUT: 810 mL / NET: 766 mL        Mode: AC/ CMV (Assist Control/ Continuous Mandatory Ventilation)  RR (machine): 24  TV (machine): 375  FiO2: 40  PEEP: 5  ITime: 1  MAP: 7.1  PIP: 16      PHYSICAL EXAM:    GENERAL: NAD, well-groomed, well-developed  HEAD:  Atraumatic, Normocephalic  EYES: EOMI, PERRLA, conjunctiva and sclera clear  ENMT: No tonsillar erythema, exudates, or enlargement; Moist mucous membranes, Good dentition, No lesions  NECK: Supple, normal appearance, No JVD; Normal thyroid; Trachea midline  NERVOUS SYSTEM:  Alert & Oriented X3, Good concentration; Motor Strength 5/5 B/L upper and lower extremities; DTRs 2+ intact and symmetric  CHEST/LUNG: No chest deformity; Normal percussion bilaterally; No rales, rhonchi, wheezing   HEART: Regular rate and rhythm; No murmurs, rubs, or gallops  ABDOMEN: Soft, Nontender, Nondistended; Bowel sounds present  EXTREMITIES:  2+ Peripheral Pulses, No clubbing, cyanosis, or edema  LYMPH: No lymphadenopathy noted  SKIN: No rashes or lesions; Good capillary refill      LABS:  CBC Full  -  ( 31 Oct 2020 06:54 )  WBC Count : 7.15 K/uL  RBC Count : 2.55 M/uL  Hemoglobin : 8.1 g/dL  Hematocrit : 24.5 %  Platelet Count - Automated : 45 K/uL  Mean Cell Volume : 96.1 fl  Mean Cell Hemoglobin : 31.8 pg  Mean Cell Hemoglobin Concentration : 33.1 gm/dL  Auto Neutrophil # : x  Auto Lymphocyte # : x  Auto Monocyte # : x  Auto Eosinophil # : x  Auto Basophil # : x  Auto Neutrophil % : x  Auto Lymphocyte % : x  Auto Monocyte % : x  Auto Eosinophil % : x  Auto Basophil % : x    10-31    143  |  112<H>  |  12  ----------------------------<  103<H>  3.9   |  23  |  1.73<H>    Ca    8.0<L>      31 Oct 2020 06:54  Phos  2.2     10-31  Mg     2.0     10-31    TPro  4.7<L>  /  Alb  2.4<L>  /  TBili  6.2<H>  /  DBili  x   /  AST  114<H>  /  ALT  61<H>  /  AlkPhos  245<H>  10-31        Culture Results:   Normal Respiratory Erin absent (10-30 @ 20:42)      RADIOLOGY & ADDITIONAL STUDIES REVIEWED:  ***    GOALS OF CARE DISCUSSION WITH PATIENT/FAMILY/PROXY:    CRITICAL CARE TIME SPENT: 35 minutes

## 2020-11-01 NOTE — PROGRESS NOTE ADULT - ASSESSMENT
AS PER ICU DW DAUGHTER IN LENGTHG       65y Female from home, lives with daughter, ambulates with walker with PMH of recurrent SBO's, s/p exp lap, SB resection in 2015, ex lap, ALBINA in 2018, DVT, PE, on Xarelto, IVC filter, chronic leg swelling, and anasarca, came in to the ED due to hypotension during office visit. Patient was found to be bacteremic with bacteroids fragilis. Admitted in ICU due to hypotension and hypothermia. On vancomycin and levo . BCx X2 negative. Sputum positive for MRSA and Stenotrophomonas.     Diagnosis:  >Encephalopathy  >Sepsis  >Bacteremia  >Anemia  >DVT/PE  >Transaminitis  >Aspiration pneumonia    --------------------------------------------Neuro--------------------------------------  -She is AAOx2 at baseline on evaluation  -Encephalopathic and got intubated on 10/24  -Ammonia trending down from 152 > 130 > 116 >103>131>126>90  -On lactulose 30g q 6h and rifaximin  -hypothermia has been resolved now  -INR>1.63>1.71>1.91  -Pt with multiorgan failure secondary to septic shock  -Hold CT head for now as AMS was most likely due to hepatic encephalopathy and pt didn't have any focal neurological deficit when she started having AMS  -her pupils are reactive to light b/l    -------------------------------------CVS-------------------------------  -Patient is hypotensive secondary septic shock  -On abx Levo, vanc stopped for high vanc trough, will trend vanc trough every morning until vanc trough goes below 20  -NG tube feeding  -Midodrine 10 mg TID  -RIJ for pressors on levophed went down from 0.07 to 0.05  -Hold Lasix home med in the setting of hypotension  -Pt is getting lasix and albumin on and off   -previous ECHO 3-4 months back showed normal EF but new ECHO showed EF 15-20% with severe left ventricular dysfunction  -pt with multiorgan dysfunction sec to septic shock , mixed venous gas from [facility] showed normal O2 saturation , less likely from the low EF   -f/u on autoimmune workup KATHY, anticardiolipin antibodies IgG IgM , ANCA  -Anticardiolipin abs +ve    --------------------------------Respiratory----------------------  -Intubated and sedated  -CT showed mild b/l pleural effusion and left lower lobe consolidation in ICU on day 2  -aspiration pneumonia  - sputum culture grew MRSA and Stenotrophomonas (sensitive to levo)  -On levo and held vanc due to high trough  -f/u repeat sputum culture  -ID Dr. Patricio    ----------------------------------------Gastrointestinal--------------------------------------  -Patient was bacteremic with bacteroids fragilis, likely secondary from intra abdominal source.   -Patient had multiple SBO in past. 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 Likely due to sepsis vs hepatic failure sec to budd Chiari  -BCx X 2 negative.  -ammonia trends down from 150 to 130 >116>103>131>126>90, on lactulose and rifaximin   -had 1BM this am, and 1 BM yesterday  -monitor BM  -GI, DR Chatterjee deferred doing colonoscopy due to multiple comorbidities, FOBT positive on 10/15  -Dr Potts recs has been noted, will repeat hepatitis panel     -----------------------------------------ID---------------------------------------  -Patient was bacteremic with bacteroids likely GI source.  -Lactate is normal 1.3 but trends up to 2.3 >3.7  -Procalcitonin 0.79  -On levo and held vanc due to high trough , will trend every day until it gets below 20  - BP on the lower side, pt is on Levophed baby dose and midodrine 10mg TID  -Monitor vitals Q4      ---------------------------------------------Nephro-------------------------------------  -Kidney function is normal, Cl is 120 with  sodium trends down today 147>150.152>152>151>148>147>142>146  -free water deficit of 500ml  -starts on 120 ml q6h  -TF  -urine lytes were sent this morning Fena was calculated 3.1,   -Monitor UO    ----------------------------------------Hem Onc----------------------------------  -Has h/o DVt and PE in past. Patient is on Xarelto at home  -Patient has anemia  -anemia of chronic disease  -Hgb 6.9 1PRBC >8.7 >7 1PRBC> 8.5>1 PRBC> 8.5>9.7>9.6>10> 9.5>8.9>8.4  -Plat trends down but trends up today [id number], no active bleeding  -Hold AC for now, will closely monitor the patient for any bleed  -Folate and B12 normal.   -Dr. Miguel aguilar has been noted, pt started on thiamine for 3 doses.     ----------------------------------------Endo--------------------------------  -HgbA1c 4  -Fs q6h    ----------------------------------------Prophylaxis----------------------------  DVT: hold Lovenox for low platelets   GI: PPI    ---------------------------------------GOC---------------------------  -DNR   -Pt with multi organ failure secondary to septic shock with poor prognosis  -Palliative is on board

## 2020-11-01 NOTE — PROGRESS NOTE ADULT - SUBJECTIVE AND OBJECTIVE BOX
Patient was seen and examined  Patient is a 65y old  Female who presents with a chief complaint of Hypotension (01 Nov 2020 17:07)      INTERVAL HPI/OVERNIGHT EVENTS:  T(C): 37 (11-01-20 @ 17:00), Max: 37.7 (11-01-20 @ 05:00)  HR: 104 (11-01-20 @ 19:00) (102 - 116)  BP: 99/69 (11-01-20 @ 19:00) (88/59 - 109/79)  RR: 36 (11-01-20 @ 19:00) (26 - 39)  SpO2: 94% (11-01-20 @ 19:00) (91% - 99%)  Wt(kg): --  I&O's Summary    31 Oct 2020 08:01  -  01 Nov 2020 07:00  --------------------------------------------------------  IN: 1897.6 mL / OUT: 700 mL / NET: 1197.6 mL    01 Nov 2020 07:01  -  01 Nov 2020 20:06  --------------------------------------------------------  IN: 1110.8 mL / OUT: 490 mL / NET: 620.8 mL        LABS:                        8.2    7.99  )-----------( 60       ( 01 Nov 2020 16:47 )             24.5     11-01    140  |  109<H>  |  17  ----------------------------<  113<H>  4.2   |  24  |  1.85<H>    Ca    7.8<L>      01 Nov 2020 06:34  Phos  2.6     11-01  Mg     1.9     11-01    TPro  5.0<L>  /  Alb  2.6<L>  /  TBili  5.6<H>  /  DBili  x   /  AST  123<H>  /  ALT  62<H>  /  AlkPhos  316<H>  11-01        CAPILLARY BLOOD GLUCOSE      POCT Blood Glucose.: 140 mg/dL (01 Nov 2020 17:13)  POCT Blood Glucose.: 156 mg/dL (01 Nov 2020 12:17)    LIPID PANEL  Cholesterol --  LDL --  HDL --  RATIO HDL/Total Cholesterol --  Triglyceride 87      ABG - ( 01 Nov 2020 04:24 )  pH, Arterial: 7.37  pH, Blood: x     /  pCO2: 39    /  pO2: 60    / HCO3: 22    / Base Excess: -2.4  /  SaO2: 89                    MEDICATIONS  (STANDING):  chlorhexidine 0.12% Liquid 15 milliLiter(s) Oral Mucosa every 12 hours  chlorhexidine 2% Cloths 1 Application(s) Topical daily  fentaNYL   Infusion. 0.5 MICROgram(s)/kG/Hr (2.81 mL/Hr) IV Continuous <Continuous>  lactulose Syrup 30 Gram(s) Oral every 6 hours  levoFLOXacin IVPB 750 milliGRAM(s) IV Intermittent every 24 hours  levoFLOXacin IVPB      midodrine. 10 milliGRAM(s) Oral three times a day  nystatin Powder 1 Application(s) Topical two times a day  pantoprazole  Injectable 40 milliGRAM(s) IV Push two times a day  polyethylene glycol 3350 17 Gram(s) Oral daily  rifAXIMin 550 milliGRAM(s) Oral two times a day    MEDICATIONS  (PRN):  sodium chloride 0.9% lock flush 10 milliLiter(s) IV Push every 1 hour PRN Pre/post blood products, medications, blood draw, and to maintain line patency      RADIOLOGY & ADDITIONAL TESTS:    Imaging Personally Reviewed:  [ ] YES  [ ] NO    REVIEW OF SYSTEMS:  CONSTITUTIONAL: No fever, weight loss, or fatigue  EYES: No eye pain, visual disturbances, or discharge  ENMT:  No difficulty hearing, tinnitus, vertigo; No sinus or throat pain  NECK: No pain or stiffness  BREASTS: No pain, masses, or nipple discharge  RESPIRATORY: No cough, wheezing, chills or hemoptysis; No shortness of breath  CARDIOVASCULAR: No chest pain, palpitations, dizziness, or leg swelling  GASTROINTESTINAL: No abdominal or epigastric pain. No nausea, vomiting, or hematemesis; No diarrhea or constipation. No melena or hematochezia.  GENITOURINARY: No dysuria, frequency, hematuria, or incontinence  NEUROLOGICAL: No headaches, memory loss, loss of strength, numbness, or tremors  SKIN: No itching, burning, rashes, or lesions   LYMPH NODES: No enlarged glands  ENDOCRINE: No heat or cold intolerance; No hair loss  MUSCULOSKELETAL: No joint pain or swelling; No muscle, back, or extremity pain  PSYCHIATRIC: No depression, anxiety, mood swings, or difficulty sleeping  HEME/LYMPH: No easy bruising, or bleeding gums  ALLERY AND IMMUNOLOGIC: No hives or eczema      Consultant(s) Notes Reviewed:  [ x ] YES  [ ] NO    PHYSICAL EXAM:  GENERAL: NAD, well-groomed, well-developed  HEAD:  Atraumatic, Normocephalic  EYES: EOMI, PERRLA, conjunctiva and sclera clear  ENMT: No tonsillar erythema, exudates, or enlargement; Moist mucous membranes, Good dentition, No lesions  NECK: Supple, No JVD, Normal thyroid  NERVOUS SYSTEM:  Alert & Oriented X3, Good concentration; Motor Strength 5/5 B/L upper and lower extremities; DTRs 2+ intact and symmetric  CHEST/LUNG: Clear to percussion bilaterally; No rales, rhonchi, wheezing, or rubs  HEART: Regular rate and rhythm; No murmurs, rubs, or gallops  ABDOMEN: Soft, Nontender, Nondistended; Bowel sounds present  EXTREMITIES:  2+ Peripheral Pulses, No clubbing, cyanosis, or edema  LYMPH: No lymphadenopathy noted  SKIN: No rashes or lesions    Care Discussed with Consultants/Other Providers [ x] YES  [ ] NO

## 2020-11-01 NOTE — PROGRESS NOTE ADULT - ASSESSMENT
Patient is a 65y old  Female from home, lives with daughter, ambulates with walker with PMH of recurrent SBO's, s/p exp lap, SB resection in 2015, ex lap, ALBINA in 2018, DVT, PE, on Xarelto, IVC filter, chronic leg swelling, and anasarca, Now send in to the ER by her PCP, Dr. Ross, for evaluation of  generalized weakness and hypotension BP of 80/40 during office visit. On admission, she found to have no fever and BP was in lower normal range and no Leukocytosis. The CXR is clear and CT abd/pelvis consistent with Ileus. The ID consult requested to assist with evaluation of infectious etiology of episode of hypotension. Found to have Bacteroides bacteremia and now developed septic shock on Cefepime and Flagyl. Hence transferred to ICU. 10/20/20.    # Hypotension  at office- BP of 80/40 - resolved   #  Bacteroides fragilis Bacteremia - 10/13/20 - ? source most likely GI- Repeat Blood Cxs have NGTD 10/16/20  # Ileus  - h/o recurrent SBO and s/p EXp. LAp  # COVID 19 negative   # Septic shock ( Hypothermia + hypotensive)- transferred to ICU since requiring pressor- source GI, The CT abd/pelvis shows nonspecific enterocolitis, noninfectious inflammatory bowel disease, or ischemic bowel, along with ileus.   # Pneumonia- HCAP vs Aspiration - on CXR 10/24 and CT chest 10/21- sputum Cx grew Methicillin resistant Staphylococcus aureus  and Stenotrophomonas maltophilia.      would recommend:    1. Continue  Vancomycin in a lower dose  500 mg since level is therapeutic now  2. Continue Levaquin to cover Stenotrophomonas  3. Aspiration precaution  4. Monitor kidney function and adjust Abx doses accordingly   5. Management of Vent as per ICU protocol  6. Keep UE elevated     d/w ICU team     Attending Attestation:    Spent more than 45 minutes on total encounter, more than 50 % of the visit was spent counseling and/or coordinating care by the Attending physician. Patient is a 65y old  Female from home, lives with daughter, ambulates with walker with PMH of recurrent SBO's, s/p exp lap, SB resection in 2015, ex lap, ALBINA in 2018, DVT, PE, on Xarelto, IVC filter, chronic leg swelling, and anasarca, Now send in to the ER by her PCP, Dr. Ross, for evaluation of  generalized weakness and hypotension BP of 80/40 during office visit. On admission, she found to have no fever and BP was in lower normal range and no Leukocytosis. The CXR is clear and CT abd/pelvis consistent with Ileus. The ID consult requested to assist with evaluation of infectious etiology of episode of hypotension. Found to have Bacteroides bacteremia and now developed septic shock on Cefepime and Flagyl. Hence transferred to ICU. 10/20/20.    # Hypotension  at office- BP of 80/40 - resolved   #  Bacteroides fragilis Bacteremia - 10/13/20 - ? source most likely GI- Repeat Blood Cxs have NGTD 10/16/20  # Ileus  - h/o recurrent SBO and s/p EXp. LAp  # COVID 19 negative   # Septic shock ( Hypothermia + hypotensive)- transferred to ICU since requiring pressor- source GI, The CT abd/pelvis shows nonspecific enterocolitis, noninfectious inflammatory bowel disease, or ischemic bowel, along with ileus.   # Pneumonia- HCAP vs Aspiration - on CXR 10/24 and CT chest 10/21- sputum Cx grew Methicillin resistant Staphylococcus aureus  and Stenotrophomonas maltophilia.      would recommend:    1. Continue  Vancomycin in a lower dose  500 mg since level is therapeutic at higher range  2. Continue Levaquin to cover Stenotrophomonas  3. Aspiration precaution  4. Monitor kidney function and adjust Abx doses accordingly   5. Management of Vent as per ICU protocol  6. Keep UE elevated     d/w ICU team     Attending Attestation:    Spent more than 45 minutes on total encounter, more than 50 % of the visit was spent counseling and/or coordinating care by the Attending physician.

## 2020-11-01 NOTE — PROGRESS NOTE ADULT - ASSESSMENT
65y Female from home, lives with daughter, ambulates with walker with PMH of recurrent SBO's, s/p exp lap, SB resection in 2015, ex lap, ALBINA in 2018, DVT, PE, on Xarelto, IVC filter, chronic leg swelling, and anasarca, came in to the ED due to hypotension during office visit. Patient was found to be bacteremic with bacteroids fragilis. Admitted in ICU due to hypotension and hypothermia. On vancomycin and levo . BCx X2 negative. Sputum positive for MRSA and Stenotrophomonas.     Diagnosis:  >Encephalopathy  >Sepsis  >Bacteremia  >Anemia  >DVT/PE  >Transaminitis  >Aspiration pneumonia    --------------------------------------------Neuro--------------------------------------  -She is AAOx2 at baseline on evaluation  -Encephalopathic and got intubated on 10/24  -Ammonia trending down from 152 > 130 > 116 >103>131>126>90  -On lactulose 30g q 6h and rifaximin  -hypothermia has been resolved now  -INR>1.63>1.71>1.91  -Pt with multiorgan failure secondary to septic shock  -Hold CT head for now as AMS was most likely due to hepatic encephalopathy and pt didn't have any focal neurological deficit when she started having AMS  -her pupils are reactive to light b/l    -------------------------------------CVS-------------------------------  -Patient is hypotensive secondary septic shock  -On abx Levo, vanc stopped for high vanc trough, will trend vanc trough every morning until vanc trough goes below 20  -NG tube feeding  -Midodrine 10 mg TID  -RIJ for pressors on levophed went down from 0.07 to 0.05  -Hold Lasix home med in the setting of hypotension  -Pt is getting lasix and albumin on and off   -previous ECHO 3-4 months back showed normal EF but new ECHO showed EF 15-20% with severe left ventricular dysfunction  -pt with multiorgan dysfunction sec to septic shock , mixed venous gas from [facility] showed normal O2 saturation , less likely from the low EF   -f/u on autoimmune workup KATHY, anticardiolipin antibodies IgG IgM , ANCA  -Anticardiolipin abs +ve    --------------------------------Respiratory----------------------  -Intubated and sedated  -CT showed mild b/l pleural effusion and left lower lobe consolidation in ICU on day 2  -aspiration pneumonia  - sputum culture grew MRSA and Stenotrophomonas (sensitive to levo)  -On levo and held vanc due to high trough  -f/u repeat sputum culture  -ID Dr. Patricio    ----------------------------------------Gastrointestinal--------------------------------------  -Patient was bacteremic with bacteroids fragilis, likely secondary from intra abdominal source.   -Patient had multiple SBO in past. Ct abdomen on admission showed ileus, Repeat CT A/P showed ileus and non occlusive ischemic colitis  -No signs of acute abdomen   -Surgery recs > No intervention, pt was admitted in Jan diagnosed with budd Chiari syndrome with portal HTN recs to transfer to [facility] but pt is not stable to transfer  -Pt is in multi organ dysfunction secondary to septic shock  -LFTs are mildly elevated since admission, slightly uptrend today. Likely due to sepsis vs hepatic failure sec to budd Chiari  -BCx X 2 negative.  -ammonia trends down from 150 to 130 >116>103>131>126>90, on lactulose and rifaximin   -had 1BM this am, and 1 BM yesterday  -monitor BM  -GI, DR Chatterjee deferred doing colonoscopy due to multiple comorbidities, FOBT positive on 10/15  -Dr Potts recs has been noted, will repeat hepatitis panel     -----------------------------------------ID---------------------------------------  -Patient was bacteremic with bacteroids likely GI source.  -Lactate is normal 1.3 but trends up to 2.3 >3.7  -Procalcitonin 0.79  -On levo and held vanc due to high trough , will trend every day until it gets below 20  - BP on the lower side, pt is on Levophed baby dose and midodrine 10mg TID  -Monitor vitals Q4      ---------------------------------------------Nephro-------------------------------------  -Kidney function is normal, Cl is 120 with  sodium trends down today 147>150.152>152>151>148>147>142>146  -free water deficit of 500ml  -starts on 120 ml q6h  -TF  -urine lytes were sent this morning Fena was calculated 3.1,   -Monitor UO    ----------------------------------------Hem Onc----------------------------------  -Has h/o DVt and PE in past. Patient is on Xarelto at home  -Patient has anemia  -anemia of chronic disease  -Hgb 6.9 1PRBC >8.7 >7 1PRBC> 8.5>1 PRBC> 8.5>9.7>9.6>10> 9.5>8.9>8.4  -Plat trends down but trends up today [id number], no active bleeding  -Hold AC for now, will closely monitor the patient for any bleed  -Folate and B12 normal.   -Dr. Miguel aguilar has been noted, pt started on thiamine for 3 doses.     ----------------------------------------Endo--------------------------------  -HgbA1c 4  -Fs q6h    ----------------------------------------Prophylaxis----------------------------  DVT: hold Lovenox for low platelets   GI: PPI    ---------------------------------------GOC---------------------------  -DNR   -Pt with multi organ failure secondary to septic shock with poor prognosis  -Palliative is on board

## 2020-11-02 NOTE — PROGRESS NOTE ADULT - ASSESSMENT
65y Female from home, lives with daughter, ambulates with walker with PMH of recurrent SBO's, s/p exp lap, SB resection in 2015, ex lap, ALBINA in 2018, DVT, PE, on Xarelto, IVC filter, chronic leg swelling, and anasarca, came in to the ED due to hypotension during office visit. Patient was found to be bacteremic with bacteroids fragilis. Admitted in ICU due to hypotension and hypothermia. On vancomycin and levo . BCx X2 negative. Sputum positive for MRSA and Stenotrophomonas.     Diagnosis:  >Encephalopathy  >Sepsis  >Bacteremia  >Anemia  >DVT/PE  >Transaminitis  >Aspiration pneumonia    --------------------------------------------Neuro--------------------------------------  -She is AAOx2 at baseline on evaluation  -Encephalopathic and got intubated on 10/24  -Ammonia trending down from 152 > 130 > 116 >103>131>126>90  -On lactulose 30g q 6h and rifaximin  -hypothermia has been resolved now  -INR>1.63>1.71>1.91  -Pt with multiorgan failure secondary to septic shock  -Hold CT head for now as AMS was most likely due to hepatic encephalopathy and pt didn't have any focal neurological deficit when she started having AMS  -her pupils are reactive to light b/l    -------------------------------------CVS-------------------------------  -Patient is hypotensive secondary septic shock  -On abx Levo, vanc stopped for high vanc trough, will trend vanc trough every morning until vanc trough goes below 20  -NG tube feeding  -Midodrine 10 mg TID  -RIJ for pressors on levophed went down from 0.07 to 0.05  -Hold Lasix home med in the setting of hypotension  -Pt is getting lasix and albumin on and off   -previous ECHO 3-4 months back showed normal EF but new ECHO showed EF 15-20% with severe left ventricular dysfunction  -pt with multiorgan dysfunction sec to septic shock , mixed venous gas from [facility] showed normal O2 saturation , less likely from the low EF   -f/u on autoimmune workup KATHY, anticardiolipin antibodies IgG IgM , ANCA  -Anticardiolipin abs +ve    --------------------------------Respiratory----------------------  -Intubated and sedated  -CT showed mild b/l pleural effusion and left lower lobe consolidation in ICU on day 2  -aspiration pneumonia  - sputum culture grew MRSA and Stenotrophomonas (sensitive to levo)  -On levo and held vanc due to high trough  -f/u repeat sputum culture  -ID Dr. Patricio    ----------------------------------------Gastrointestinal--------------------------------------  -Patient was bacteremic with bacteroids fragilis, likely secondary from intra abdominal source.   -Patient had multiple SBO in past. Ct abdomen on admission showed ileus, Repeat CT A/P showed ileus and non occlusive ischemic colitis  -No signs of acute abdomen   -Surgery recs > No intervention, pt was admitted in Jan diagnosed with budd Chiari syndrome with portal HTN recs to transfer to [facility] but pt is not stable to transfer  -Pt is in multi organ dysfunction secondary to septic shock  -LFTs are mildly elevated since admission, slightly uptrend today. Likely due to sepsis vs hepatic failure sec to budd Chiari  -BCx X 2 negative.  -ammonia trends down from 150 to 130 >116>103>131>126>90, on lactulose and rifaximin   -had 1BM this am, and 1 BM yesterday  -monitor BM  -GI, DR Chatterjee deferred doing colonoscopy due to multiple comorbidities, FOBT positive on 10/15  -Dr Potts recs has been noted, will repeat hepatitis panel     -----------------------------------------ID---------------------------------------  -Patient was bacteremic with bacteroids likely GI source.  -Lactate is normal 1.3 but trends up to 2.3 >3.7  -Procalcitonin 0.79  -On levo and held vanc due to high trough , will trend every day until it gets below 20  - BP on the lower side, pt is on Levophed baby dose and midodrine 10mg TID  -Monitor vitals Q4      ---------------------------------------------Nephro-------------------------------------  -Kidney function is normal, Cl is 120 with  sodium trends down today 147>150.152>152>151>148>147>142>146  -free water deficit of 500ml  -starts on 120 ml q6h  -TF  -urine lytes were sent this morning Fena was calculated 3.1,   -Monitor UO    ----------------------------------------Hem Onc----------------------------------  -Has h/o DVt and PE in past. Patient is on Xarelto at home  -Patient has anemia  -anemia of chronic disease  -Hgb 6.9 1PRBC >8.7 >7 1PRBC> 8.5>1 PRBC> 8.5>9.7>9.6>10> 9.5>8.9>8.4  -Plat trends down but trends up today [id number]>[id number]>45371>72802>67276>25208, no active bleeding  -Hold AC for now, will closely monitor the patient for any bleed  -Folate and B12 normal.   -Dr. Miguel aguilar has been noted, pt started on thiamine for 3 doses.     ----------------------------------------Endo--------------------------------  -HgbA1c 4  -Fs q6h    ----------------------------------------Prophylaxis----------------------------  DVT: hold Lovenox for low platelets   GI: PPI    ---------------------------------------GOC---------------------------  -DNR   -Pt with multi organ failure secondary to septic shock with poor prognosis  -Palliative is on board       65y Female from home, lives with daughter, ambulates with walker with PMH of recurrent SBO's, s/p exp lap, SB resection in 2015, ex lap, ALBINA in 2018, DVT, PE, on Xarelto, IVC filter, chronic leg swelling, and anasarca, came in to the ED due to hypotension during office visit. Patient was found to be bacteremic with bacteroids fragilis. Admitted in ICU due to hypotension and hypothermia. On vancomycin and levo . BCx X2 negative. Sputum positive for MRSA and Stenotrophomonas.     Diagnosis:  >Encephalopathy  >Sepsis  >Bacteremia  >Anemia  >DVT/PE  >Transaminitis  >Aspiration pneumonia    --------------------------------------------Neuro--------------------------------------  -She is AAOx2 at baseline on evaluation  -Encephalopathic and got intubated on 10/24  -Ammonia trending down from 152 > 130 > 116 >103>131>126>90>77  -On lactulose 30g q 6h and rifaximin  -hypothermia has been resolved now  -INR>1.63>1.71>1.91>1.98  -Pt with multiorgan failure secondary to septic shock  -Hold CT head for now as AMS was most likely due to hepatic encephalopathy and pt didn't have any focal neurological deficit when she started having AMS  -her pupils are reactive to light b/l, patient can follow minimal commands       -------------------------------------CVS-------------------------------  -Patient is hypotensive secondary septic shock  -On abx Levo, vanc stopped previousely  for high vanc trough, recent vanc trough 21.2 m, will start on vancomycin 500mg qd for now with monitoring vanc trough   -NG tube feeding  -Midodrine 10 mg TID  -RIJ for pressors on levophed went down from 0.07 to 0.05  -Hold Lasix home med in the setting of hypotension  -Pt is getting lasix and albumin on and off   -previous ECHO 3-4 months back showed normal EF but new ECHO showed EF 15-20% with severe left ventricular dysfunction  -pt with multiorgan dysfunction sec to septic shock , mixed venous gas from [facility] showed normal O2 saturation , less likely from the low EF   -f/u on autoimmune workup KATHY, anticardiolipin antibodies IgG IgM , ANCA  -Anticardiolipin abs +ve    --------------------------------Respiratory----------------------  -Intubated and sedated  -CT showed mild b/l pleural effusion and left lower lobe consolidation in ICU on day 2  -aspiration pneumonia  - sputum culture grew MRSA and Stenotrophomonas (sensitive to levo)  -On levo and 500mg vancomycin   -f/u repeat sputum culture  -ID Dr. Patricio    ----------------------------------------Gastrointestinal--------------------------------------  -Patient was bacteremic with bacteroids fragilis, likely secondary from intra abdominal source.   -Patient had multiple SBO in past. Ct abdomen on admission showed ileus, Repeat CT A/P showed ileus and non occlusive ischemic colitis  -No signs of acute abdomen   -Surgery recs > No intervention, pt was admitted in Jan diagnosed with budd Chiari syndrome with portal HTN recs to transfer to [facility] but pt is not stable to transfer  -Pt is in multi organ dysfunction secondary to septic shock  -LFTs are mildly elevated since admission, slightly uptrend today. Likely due to sepsis vs hepatic failure sec to budd Chiari  -BCx X 2 negative.  -ammonia trends down from 150 to 130 >116>103>131>126>90> 77, on lactulose and rifaximin   -monitor BM  -GI, DR Chatterjee deferred doing colonoscopy due to multiple comorbidities, FOBT positive on 10/15  -Dr Potts recs has been noted, will repeat hepatitis panel     -----------------------------------------ID---------------------------------------  -Patient was bacteremic with bacteroids likely GI source.  -Lactate is normal 1.3 but trends up to 2.3 >3.7  -Procalcitonin 0.79  -On levo and held vanc due to high trough , will trend every day until it gets below 20  - BP on the lower side, pt is on Levophed baby dose and midodrine 10mg TID  -Monitor vitals Q4      ---------------------------------------------Nephro-------------------------------------  -Kidney function is normal, Cl is 120 with  sodium trends down today 147>150.152>152>151>148>147>142>146> 139  -free water deficit of 500ml  -starts on 120 ml q6h  -TF  -urine lytes were sent this morning Fena was calculated 3.1,   -Monitor UO, decreased UO with anasarca edema     ----------------------------------------Hem Onc----------------------------------  -Has h/o DVt and PE in past. Patient is on Xarelto at home  -Patient has anemia  -anemia of chronic disease  -Hgb 6.8>1PRBC>8.2  -Plat trends down but trends up today to 60K , no active bleeding  - will start on prophylactic dose Ac Heparin 5000 q12 hrs per dr rivera recommendation   -Folate and B12 normal.   -Dr. Rivera recs has been noted, pt started on thiamine for 3 doses.     ----------------------------------------Endo--------------------------------  -HgbA1c 4  -Fs q6h    ----------------------------------------Prophylaxis----------------------------  DVT: heparin sq 5000 q12 hrs  GI: PPI    ---------------------------------------GOC---------------------------  -DNR   -Pt with multi organ failure secondary to septic shock with poor prognosis  -Palliative is on board

## 2020-11-02 NOTE — PROGRESS NOTE ADULT - ASSESSMENT
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  BC grew bacteroides.    Problem/Recommendation - 1:  Problem: Bacteremia. Recommendation: bacteroides  most likely GI origin  on antibiotics  he has hypothermia and hypotension and elevated lactic acid  in ICU now  on  pressor  check cortisol level to r/o adrenal insuff  multiorgan failure  christel is trending down  Problem/Recommendation - 2:  ·  Problem: Anemia.  Recommendation: ferritin, b12 folate normal  no hemolysis  most likley due to malnutrition from short bowel syndrome.she also had GIB multiple times before.   Problem/Recommendation - 3:  ·  Problem: Acute deep vein thrombosis (DVT) of other vein of upper extremity.  Recommendation: she has been on xarelto for 2 years.  DVT at left arm and left leg before.  in all CT scan i did not see report of IVC or hepatic vein thrombosis  off xarelto and lovenox due to elevated PT/PTT4. elevated PT/PTT due to malnutrition and antibiotics causing VITK defnow it is mostly recovered  it begins to rise again, correctable by mixing study  probably due to liver failure this time  she develops DVT very quickly while off AC  need DVT prophylaxis, sq heparin  5. thrombocytopenia  new onset, most likely from sepsis or medication, procal is rising  the other possibilties causing multiorgan failure, such as autoimmune, vasculitis, HLH, catastrophic APS(less likely)  platelet is trending up  6. CHF, new onset global hypokinesia,   cardiomyopathy, ?etiology  will give thiamin  7. infiltrates, CHF vs infection  check COVID again  8. multiorgan failure seems to be improving

## 2020-11-02 NOTE — PROGRESS NOTE ADULT - SUBJECTIVE AND OBJECTIVE BOX
Patient was seen and examined  Patient is a 65y old  Female who presents with a chief complaint of Hypotension (02 Nov 2020 09:31)      INTERVAL HPI/OVERNIGHT EVENTS:  T(C): 36.1 (11-02-20 @ 11:00), Max: 37 (11-01-20 @ 17:00)  HR: 97 (11-02-20 @ 13:00) (91 - 108)  BP: 96/70 (11-02-20 @ 13:00) (86/62 - 997/-)  RR: 25 (11-02-20 @ 13:00) (25 - 39)  SpO2: 99% (11-02-20 @ 13:00) (94% - 100%)  Wt(kg): --  I&O's Summary    01 Nov 2020 07:01  -  02 Nov 2020 07:00  --------------------------------------------------------  IN: 1424.4 mL / OUT: 835 mL / NET: 589.4 mL    02 Nov 2020 07:01  -  02 Nov 2020 16:14  --------------------------------------------------------  IN: 382.4 mL / OUT: 80 mL / NET: 302.4 mL        LABS:                        8.2    10.08 )-----------( 69       ( 02 Nov 2020 06:27 )             24.8     11-02    139  |  108  |  20<H>  ----------------------------<  154<H>  4.0   |  23  |  2.00<H>    Ca    7.8<L>      02 Nov 2020 06:27  Phos  2.6     11-02  Mg     1.8     11-02    TPro  5.0<L>  /  Alb  2.6<L>  /  TBili  5.8<H>  /  DBili  x   /  AST  112<H>  /  ALT  61<H>  /  AlkPhos  282<H>  11-02        CAPILLARY BLOOD GLUCOSE      POCT Blood Glucose.: 144 mg/dL (02 Nov 2020 11:42)  POCT Blood Glucose.: 169 mg/dL (02 Nov 2020 06:35)  POCT Blood Glucose.: 156 mg/dL (02 Nov 2020 00:09)  POCT Blood Glucose.: 140 mg/dL (01 Nov 2020 17:13)    LIPID PANEL  Cholesterol --  LDL --  HDL --  RATIO HDL/Total Cholesterol --  Triglyceride 87      ABG - ( 02 Nov 2020 04:26 )  pH, Arterial: 7.37  pH, Blood: x     /  pCO2: 37    /  pO2: 66    / HCO3: 21    / Base Excess: -3.6  /  SaO2: 92                    MEDICATIONS  (STANDING):  chlorhexidine 0.12% Liquid 15 milliLiter(s) Oral Mucosa every 12 hours  chlorhexidine 2% Cloths 1 Application(s) Topical daily  fentaNYL   Infusion. 0.5 MICROgram(s)/kG/Hr (2.81 mL/Hr) IV Continuous <Continuous>  heparin   Injectable 5000 Unit(s) SubCutaneous every 12 hours  lactulose Syrup 30 Gram(s) Oral every 6 hours  levoFLOXacin IVPB 750 milliGRAM(s) IV Intermittent every 24 hours  levoFLOXacin IVPB      midodrine. 10 milliGRAM(s) Oral three times a day  nystatin Powder 1 Application(s) Topical two times a day  pantoprazole  Injectable 40 milliGRAM(s) IV Push two times a day  polyethylene glycol 3350 17 Gram(s) Oral daily  rifAXIMin 550 milliGRAM(s) Oral two times a day  vancomycin  IVPB        MEDICATIONS  (PRN):  sodium chloride 0.9% lock flush 10 milliLiter(s) IV Push every 1 hour PRN Pre/post blood products, medications, blood draw, and to maintain line patency      RADIOLOGY & ADDITIONAL TESTS:    Imaging Personally Reviewed:  [ ] YES  [ ] NO    REVIEW OF SYSTEMS:  unable       Consultant(s) Notes Reviewed:  [ x ] YES  [ ] NO    PHYSICAL EXAM:  GENERAL: NAD, well-groomed, well-developed  HEAD:  Atraumatic, Normocephalic  EYES: EOMI, PERRLA, conjunctiva and sclera clear  ENMT: No tonsillar erythema, exudates, or enlargement; Moist mucous membranes, Good dentition, No lesions  NECK: Supple, No JVD, Normal thyroid  NERVOUS SYSTEM:  on vent   CHEST/LUNG: bl crackles  HEART: Regular rate and rhythm; No murmurs, rubs, or gallops  ABDOMEN: Soft, Nontender, Nondistended; Bowel sounds present  EXTREMITIES:  2+ Peripheral Pulses, No clubbing, cyanosis, or edema  LYMPH: No lymphadenopathy noted  SKIN: No rashes or lesions    Care Discussed with Consultants/Other Providers [ x] YES  [ ] NO

## 2020-11-02 NOTE — PROGRESS NOTE ADULT - SUBJECTIVE AND OBJECTIVE BOX
INTERVAL HPI/OVERNIGHT EVENTS: ***    PRESSORS: [ ] YES [ ] NO  WHICH:    ANTIBIOTICS:                  DATE STARTED:  ANTIBIOTICS:                  DATE STARTED:  ANTIBIOTICS:                  DATE STARTED:    Antimicrobial:  levoFLOXacin IVPB 750 milliGRAM(s) IV Intermittent every 24 hours  levoFLOXacin IVPB      rifAXIMin 550 milliGRAM(s) Oral two times a day  vancomycin  IVPB        Cardiovascular:  midodrine. 10 milliGRAM(s) Oral three times a day    Pulmonary:    Hematalogic:    Other:  chlorhexidine 0.12% Liquid 15 milliLiter(s) Oral Mucosa every 12 hours  chlorhexidine 2% Cloths 1 Application(s) Topical daily  fentaNYL   Infusion. 0.5 MICROgram(s)/kG/Hr IV Continuous <Continuous>  lactulose Syrup 30 Gram(s) Oral every 6 hours  nystatin Powder 1 Application(s) Topical two times a day  pantoprazole  Injectable 40 milliGRAM(s) IV Push two times a day  polyethylene glycol 3350 17 Gram(s) Oral daily  sodium chloride 0.9% lock flush 10 milliLiter(s) IV Push every 1 hour PRN    chlorhexidine 0.12% Liquid 15 milliLiter(s) Oral Mucosa every 12 hours  chlorhexidine 2% Cloths 1 Application(s) Topical daily  fentaNYL   Infusion. 0.5 MICROgram(s)/kG/Hr IV Continuous <Continuous>  lactulose Syrup 30 Gram(s) Oral every 6 hours  levoFLOXacin IVPB 750 milliGRAM(s) IV Intermittent every 24 hours  levoFLOXacin IVPB      midodrine. 10 milliGRAM(s) Oral three times a day  nystatin Powder 1 Application(s) Topical two times a day  pantoprazole  Injectable 40 milliGRAM(s) IV Push two times a day  polyethylene glycol 3350 17 Gram(s) Oral daily  rifAXIMin 550 milliGRAM(s) Oral two times a day  sodium chloride 0.9% lock flush 10 milliLiter(s) IV Push every 1 hour PRN  vancomycin  IVPB        Drug Dosing Weight  Height (cm): 167.6 (21 Oct 2020 02:26)  Weight (kg): 56.2 (21 Oct 2020 02:26)  BMI (kg/m2): 20 (21 Oct 2020 02:26)  BSA (m2): 1.63 (21 Oct 2020 02:26)    CENTRAL LINE: [ ] YES [ ] NO  LOCATION:         TREJO: [ ] YES [ ] NO          A-LINE:  [ ] YES [ ] NO  LOCATION:             ICU Vital Signs Last 24 Hrs  T(C): 36.8 (02 Nov 2020 07:00), Max: 37.2 (01 Nov 2020 14:00)  T(F): 98.3 (02 Nov 2020 07:00), Max: 98.9 (01 Nov 2020 14:00)  HR: 91 (02 Nov 2020 08:27) (91 - 108)  BP: 96/70 (02 Nov 2020 08:00) (86/62 - 997/-)  BP(mean): 77 (02 Nov 2020 08:00) (68 - 86)  ABP: --  ABP(mean): --  RR: 38 (02 Nov 2020 08:00) (27 - 39)  SpO2: 98% (02 Nov 2020 08:27) (94% - 99%)      ABG - ( 02 Nov 2020 04:26 )  pH, Arterial: 7.37  pH, Blood: x     /  pCO2: 37    /  pO2: 66    / HCO3: 21    / Base Excess: -3.6  /  SaO2: 92                    11-01 @ 07:01  -  11-02 @ 07:00  --------------------------------------------------------  IN: 1424.4 mL / OUT: 835 mL / NET: 589.4 mL        Mode: AC/ CMV (Assist Control/ Continuous Mandatory Ventilation)  RR (machine): 24  TV (machine): 375  FiO2: 40  PEEP: 5  ITime: 1  MAP: 13  PIP: 26      REVIEW OF SYSTEMS:    CONSTITUTIONAL: No weakness, fevers or chills  NECK: No pain or stiffness  RESPIRATORY: No cough, wheezing, hemoptysis; No shortness of breath  CARDIOVASCULAR: No chest pain or palpitations  GASTROINTESTINAL: No abdominal or epigastric pain. No nausea, vomiting, No diarrhea or constipation. No melena or hematochezia.  GENITOURINARY: No dysuria, frequency or hematuria  NEUROLOGICAL: No numbness or weakness  All other review of systems is negative unless indicated above      PHYSICAL EXAM:    GENERAL: NAD, well-groomed, well-developed  EYES: EOMI, PERRLA,   NECK: Supple, No JVD; Normal thyroid; Trachea midline  NERVOUS SYSTEM:  Alert & Oriented X3,  Motor Strength 5/5 B/L upper and lower extremities; DTRs 2+ intact and symmetric  CHEST/LUNG: No rales, rhonchi, wheezing   HEART: Regular rate and rhythm; No murmurs,   ABDOMEN: Soft, Nontender, Nondistended; Bowel sounds present  EXTREMITIES:  2+ Peripheral Pulses, No clubbing, cyanosis, or edema        LABS:  CBC Full  -  ( 02 Nov 2020 06:27 )  WBC Count : 10.08 K/uL  RBC Count : 2.67 M/uL  Hemoglobin : 8.2 g/dL  Hematocrit : 24.8 %  Platelet Count - Automated : 69 K/uL  Mean Cell Volume : 92.9 fl  Mean Cell Hemoglobin : 30.7 pg  Mean Cell Hemoglobin Concentration : 33.1 gm/dL  Auto Neutrophil # : x  Auto Lymphocyte # : x  Auto Monocyte # : x  Auto Eosinophil # : x  Auto Basophil # : x  Auto Neutrophil % : x  Auto Lymphocyte % : x  Auto Monocyte % : x  Auto Eosinophil % : x  Auto Basophil % : x    11-02    139  |  108  |  20<H>  ----------------------------<  154<H>  4.0   |  23  |  2.00<H>    Ca    7.8<L>      02 Nov 2020 06:27  Phos  2.6     11-02  Mg     1.8     11-02    TPro  5.0<L>  /  Alb  2.6<L>  /  TBili  5.8<H>  /  DBili  x   /  AST  112<H>  /  ALT  61<H>  /  AlkPhos  282<H>  11-02        Culture Results:   Normal Respiratory Erin absent (10-30 @ 20:42)      RADIOLOGY & ADDITIONAL STUDIES REVIEWED:  ***    [ ]GOALS OF CARE DISCUSSION WITH PATIENT/FAMILY/PROXY:    CRITICAL CARE TIME SPENT: 35 minutes INTERVAL HPI/OVERNIGHT EVENTS:  Patient mental status with slight improvement in am, can follow minimal commands    UO on lower side , with positiev total net , cr in trending up  will start prophylactic dose heparin for DVt per hem-onc rcms and given improvement in plt      PRESSORS: [ ] YES [ ] NO  WHICH:    ANTIBIOTICS:                  DATE STARTED:  ANTIBIOTICS:                  DATE STARTED:  ANTIBIOTICS:                  DATE STARTED:    Antimicrobial:  levoFLOXacin IVPB 750 milliGRAM(s) IV Intermittent every 24 hours  levoFLOXacin IVPB      rifAXIMin 550 milliGRAM(s) Oral two times a day  vancomycin  IVPB        Cardiovascular:  midodrine. 10 milliGRAM(s) Oral three times a day    Pulmonary:    Hematalogic:    Other:  chlorhexidine 0.12% Liquid 15 milliLiter(s) Oral Mucosa every 12 hours  chlorhexidine 2% Cloths 1 Application(s) Topical daily  fentaNYL   Infusion. 0.5 MICROgram(s)/kG/Hr IV Continuous <Continuous>  lactulose Syrup 30 Gram(s) Oral every 6 hours  nystatin Powder 1 Application(s) Topical two times a day  pantoprazole  Injectable 40 milliGRAM(s) IV Push two times a day  polyethylene glycol 3350 17 Gram(s) Oral daily  sodium chloride 0.9% lock flush 10 milliLiter(s) IV Push every 1 hour PRN    chlorhexidine 0.12% Liquid 15 milliLiter(s) Oral Mucosa every 12 hours  chlorhexidine 2% Cloths 1 Application(s) Topical daily  fentaNYL   Infusion. 0.5 MICROgram(s)/kG/Hr IV Continuous <Continuous>  lactulose Syrup 30 Gram(s) Oral every 6 hours  levoFLOXacin IVPB 750 milliGRAM(s) IV Intermittent every 24 hours  levoFLOXacin IVPB      midodrine. 10 milliGRAM(s) Oral three times a day  nystatin Powder 1 Application(s) Topical two times a day  pantoprazole  Injectable 40 milliGRAM(s) IV Push two times a day  polyethylene glycol 3350 17 Gram(s) Oral daily  rifAXIMin 550 milliGRAM(s) Oral two times a day  sodium chloride 0.9% lock flush 10 milliLiter(s) IV Push every 1 hour PRN  vancomycin  IVPB        Drug Dosing Weight  Height (cm): 167.6 (21 Oct 2020 02:26)  Weight (kg): 56.2 (21 Oct 2020 02:26)  BMI (kg/m2): 20 (21 Oct 2020 02:26)  BSA (m2): 1.63 (21 Oct 2020 02:26)    CENTRAL LINE: [ ] YES [ ] NO  LOCATION:         TREJO: [ ] YES [ ] NO          A-LINE:  [ ] YES [ ] NO  LOCATION:             ICU Vital Signs Last 24 Hrs  T(C): 36.8 (02 Nov 2020 07:00), Max: 37.2 (01 Nov 2020 14:00)  T(F): 98.3 (02 Nov 2020 07:00), Max: 98.9 (01 Nov 2020 14:00)  HR: 91 (02 Nov 2020 08:27) (91 - 108)  BP: 96/70 (02 Nov 2020 08:00) (86/62 - 997/-)  BP(mean): 77 (02 Nov 2020 08:00) (68 - 86)  ABP: --  ABP(mean): --  RR: 38 (02 Nov 2020 08:00) (27 - 39)  SpO2: 98% (02 Nov 2020 08:27) (94% - 99%)      ABG - ( 02 Nov 2020 04:26 )  pH, Arterial: 7.37  pH, Blood: x     /  pCO2: 37    /  pO2: 66    / HCO3: 21    / Base Excess: -3.6  /  SaO2: 92                    11-01 @ 07:01  -  11-02 @ 07:00  --------------------------------------------------------  IN: 1424.4 mL / OUT: 835 mL / NET: 589.4 mL        Mode: AC/ CMV (Assist Control/ Continuous Mandatory Ventilation)  RR (machine): 24  TV (machine): 375  FiO2: 40  PEEP: 5  ITime: 1  MAP: 13  PIP: 26            LABS:  CBC Full  -  ( 02 Nov 2020 06:27 )  WBC Count : 10.08 K/uL  RBC Count : 2.67 M/uL  Hemoglobin : 8.2 g/dL  Hematocrit : 24.8 %  Platelet Count - Automated : 69 K/uL  Mean Cell Volume : 92.9 fl  Mean Cell Hemoglobin : 30.7 pg  Mean Cell Hemoglobin Concentration : 33.1 gm/dL  Auto Neutrophil # : x  Auto Lymphocyte # : x  Auto Monocyte # : x  Auto Eosinophil # : x  Auto Basophil # : x  Auto Neutrophil % : x  Auto Lymphocyte % : x  Auto Monocyte % : x  Auto Eosinophil % : x  Auto Basophil % : x    11-02    139  |  108  |  20<H>  ----------------------------<  154<H>  4.0   |  23  |  2.00<H>    Ca    7.8<L>      02 Nov 2020 06:27  Phos  2.6     11-02  Mg     1.8     11-02    TPro  5.0<L>  /  Alb  2.6<L>  /  TBili  5.8<H>  /  DBili  x   /  AST  112<H>  /  ALT  61<H>  /  AlkPhos  282<H>  11-02    PHYSICAL EXAM:    GENERAL: Intubated and sedated + ETT  HEAD:  Atraumatic, Normocephalic  EYES: b/l pupil reactive to light.   NECK: Supple, normal appearance, No JVD; Normal thyroid; Trachea midline  NERVOUS SYSTEM:  Alert & Oriented X0  CHEST/LUNG: equal b/l air entry and rhonchi on auscultation  HEART: Regular rate and rhythm; No murmurs, rubs, or gallops  ABDOMEN: Soft, Nontender, mildly distended; Bowel sounds present  EXTREMITIES:  2+ Peripheral Pulses, No clubbing, cyanosis, or edema  LYMPH: No lymphadenopathy noted  SKIN: No rashes or lesions; Good capillary refill    Culture Results:   Normal Respiratory Erin absent (10-30 @ 20:42)      RADIOLOGY & ADDITIONAL STUDIES REVIEWED:  ***    [ ]GOALS OF CARE DISCUSSION WITH PATIENT/FAMILY/PROXY:    CRITICAL CARE TIME SPENT: 35 minutes

## 2020-11-02 NOTE — PROGRESS NOTE ADULT - SUBJECTIVE AND OBJECTIVE BOX
Patient is seen and examined at the bed side, is Normothermic.  She remains on vent. and OFF pressor, but awake now.  The creatinine is trending up.      REVIEW OF SYSTEMS: Unable to obtain due to mental status         ALLERGIES: No Known Allergies        ICU Vital Signs Last 24 Hrs  T(C): 36.1 (02 Nov 2020 14:00), Max: 36.9 (01 Nov 2020 20:00)  T(F): 97 (02 Nov 2020 14:00), Max: 98.5 (01 Nov 2020 20:00)  HR: 96 (02 Nov 2020 17:00) (91 - 108)  BP: 96/68 (02 Nov 2020 17:00) (86/62 - 997/-)  BP(mean): 75 (02 Nov 2020 17:00) (68 - 79)  ABP: --  ABP(mean): --  RR: 34 (02 Nov 2020 17:00) (25 - 39)  SpO2: 98% (02 Nov 2020 17:00) (93% - 100%)      PHYSICAL EXAM:  GENERAL: Intubated /vented,   CHEST/LUNG: Not using accessory muscles   HEART: s1 and s2 present  ABDOMEN:  Nontender and  Nondistended  EXTREMITIES: B/L UE edematous  CNS: Intubated/vented, awake          LABS:                        8.2    10.08 )-----------( 69       ( 02 Nov 2020 06:27 )             24.8                             8.2    7.99  )-----------( 60       ( 01 Nov 2020 16:47 )             24.5              11-02    139  |  108  |  20<H>  ----------------------------<  154<H>  4.0   |  23  |  2.00<H>    Ca    7.8<L>      02 Nov 2020 06:27  Phos  2.6     11-02  Mg     1.8     11-02    TPro  5.0<L>  /  Alb  2.6<L>  /  TBili  5.8<H>  /  DBili  x   /  AST  112<H>  /  ALT  61<H>  /  AlkPhos  282<H>  11-02    11-01    140  |  109<H>  |  17  ----------------------------<  113<H>  4.2   |  24  |  1.85<H>    Ca    7.8<L>      01 Nov 2020 06:34  Phos  2.6     11-01  Mg     1.9     11-01    TPro  5.0<L>  /  Alb  2.6<L>  /  TBili  5.6<H>  /  DBili  x   /  AST  123<H>  /  ALT  62<H>  /  AlkPhos  316<H>  11-01    10-31    143  |  112<H>  |  12  ----------------------------<  103<H>  3.9   |  23  |  1.73<H>    Ca    8.0<L>      31 Oct 2020 06:54  Phos  2.2     10-31  Mg     2.0     10-31    TPro  4.7<L>  /  Alb  2.4<L>  /  TBili  6.2<H>  /  DBili  x   /  AST  114<H>  /  ALT  61<H>  /  AlkPhos  245<H>  10-31      vVancomycin Level, Trough (11.01.20 @ 04:59)   Vancomycin Level, Trough: 20.9:    Vancomycin Level, Trough (10.31.20 @ 06:54)   Vancomycin Level, Trough: 20.5:    Vancomycin Level, Trough (10.30.20 @ 06:30)   Vancomycin Level, Trough: 28.1:    Vancomycin Level, Trough (10.29.20 @ 05:11)   Vancomycin Level, Trough: 36.3    Procalcitonin, Serum (10.15.20 @ 11:48)   Procalcitonin, Serum: 0.16: Procalcitonin (PCT) Interpretation (ng/mL) - Diagnosis of systemic   bacterial infection/sepsis         MEDICATIONS  (STANDING):    chlorhexidine 0.12% Liquid 15 milliLiter(s) Oral Mucosa every 12 hours  chlorhexidine 2% Cloths 1 Application(s) Topical daily  fentaNYL   Infusion. 0.5 MICROgram(s)/kG/Hr (2.81 mL/Hr) IV Continuous <Continuous>  heparin   Injectable 5000 Unit(s) SubCutaneous every 12 hours  lactulose Syrup 30 Gram(s) Oral every 6 hours  levoFLOXacin IVPB 750 milliGRAM(s) IV Intermittent every 24 hours  levoFLOXacin IVPB      midodrine. 10 milliGRAM(s) Oral three times a day  nystatin Powder 1 Application(s) Topical two times a day  pantoprazole  Injectable 40 milliGRAM(s) IV Push two times a day  polyethylene glycol 3350 17 Gram(s) Oral daily  rifAXIMin 550 milliGRAM(s) Oral two times a day  vancomycin  IVPB          RADIOLOGY & ADDITIONAL TESTS:    10/25/20: Xray Chest 1 View- PORTABLE-Routine (Xray Chest 1 View- PORTABLE-Routine in AM.) (10.25.20 @ 09:21) Frontal expiratory view of the chest shows the heart to be similar in size. Endotracheal tube, right jugular line and feeding tube remain present.    The lungs show similar left upper lobe infiltrate with progression of right perihilar infiltrate. Pleural effusions are similar. There is no evidence of pneumothorax.      10/23/20 : Xray Chest 1 View-PORTABLE IMMEDIATE (Xray Chest 1 View-PORTABLE IMMEDIATE .) (10.23.20 @ 19:09) Feeding tube tip near GE junction as noted. Questionable right upper lobe infiltrate. Follow-up study is recommended as clinically warranted.      10/21/20 : CT Abdomen and Pelvis w/ Oral Cont and w/ IV Cont (10.21.20 @ 15:59) Mural thickening of the left colon and rectum. Liquid stool in the colon. Apparent segmental mural thickening of the mid to distal small bowel and the proximal duodenum. Findings may represent nonspecific enterocolitis, noninfectious inflammatory bowel disease, or ischemic bowel. Clinical correlation is recommended. The celiac axis artery, SMA, and KINA are patent without stenosis.    Dilatation of the mid small bowel is again noted. Oral contrast has reached the terminal ileum. Findings may represent small bowel obstruction, or ileus related to nonspecific enterocolitis.   Cholelithiasis.    Moderate to large ascites in the abdomen, increased since the previous examination. 5 mm nonspecific noncalcified left upper lobe lung nodule; if the patient's is in the high risk category (i.e. smoker), follow-up chest CT may be pursued in 12 months to ensure stability.    Combination of atelectasis and consolidation in the left lower lobe.    Possible 1.0 cm hypodense lesion in the left lobe of the thyroid. Thyroid ultrasound may be pursued for further evaluation.   Mild bilateral pleural effusions.    Aging determinate compression fracture at T5 vertebra.        10/13/20 : CT Abdomen and Pelvis w/ IV Cont (10.13.20 @ 18:50) Diffuse dilatation of small bowel loops without a clear transition is slightly increased, likely an ileus.  Decreased moderate ascites.    1.5 cm pancreatic versus peripancreatic soft tissue nodule    10/13/20 : Xray Chest 1 View- PORTABLE-Urgent (Xray Chest 1 View- PORTABLE-Urgent .) (10.13.20 @ 16:34) Clear lungs.          MICROBIOLOGY DATA:    Culture - Sputum . (10.26.20 @ 00:26)   - Ampicillin/Sulbactam: R <=8/4   - Cefazolin: R >16   - Ceftazidime: I 16   - Clindamycin: R >4   - Erythromycin: R >4   - Gentamicin: S <=1 Should not be used as monotherapy   - Levofloxacin: S <=0.5   - Linezolid: S 2   - Oxacillin: R >2   - Penicillin: R >8   - RIF- Rifampin: S <=1 Should not be used as monotherapy   - Tetra/Doxy: S <=1   - Trimethoprim/Sulfamethoxazole: S <=0.5/9.5   - Trimethoprim/Sulfamethoxazole: S <=0.5/9.5   - Vancomycin: S 1   Gram Stain:  Moderate polymorphonuclear leukocytes per low power field   Rare Squamous epithelial cells per low power field   Moderate Gram Positive Cocci in Clusters per oil power field   Moderate Yeast per oil power field   Specimen Source: .Sputum Sputum   Culture Results:  Moderate Methicillin resistant Staphylococcus aureus   Moderate Stenotrophomonas maltophilia   Normal Respiratory Erin present   Organism Identification: Methicillin resistant Staphylococcus aureus   Stenotrophomonas maltophilia   Organism: Methicillin resistant Staphylococcus aureus   Organism: Stenotrophomonas maltophilia       MRSA/MSSA PCR (10.21.20 @ 09:41)   MRSA PCR Result.: Detected:       Culture - Blood (10.20.20 @ 22:09)   Specimen Source: .Blood Blood   Culture Results:  No growth to date.     Culture - Blood (10.20.20 @ 22:09)   Specimen Source: .Blood Blood   Culture Results:   No growth to date.     Culture - Blood in AM (10.16.20 @ 10:13)   Specimen Source: .Blood Blood-Peripheral   Culture Results:   No growth to date.     Culture - Blood in AM (10.16.20 @ 10:13)   Specimen Source: .Blood Blood-Peripheral   Culture Results:   No growth to date.     Culture - Blood (10.13.20 @ 22:23)   Growth in anaerobic bottle: Gram Negative Rods   Specimen Source: .Blood Blood-Peripheral   Organism: Blood Culture PCR   Culture Results:   Growth in anaerobic bottle: Bacteroides fragilis   "Susceptibilities not performed"     Culture - Blood (10.13.20 @ 22:23)   Specimen Source: .Blood Blood-Peripheral   Culture Results:   No growth to date.     Urine Microscopic-Add On (NC) (10.13.20 @ 21:39)   Bacteria: Moderate /HPF   Epithelial Cells: Moderate /HPF   Red Blood Cell - Urine: 5-10 /HPF   White Blood Cell - Urine: 6-10 /HPF

## 2020-11-02 NOTE — PROGRESS NOTE ADULT - ASSESSMENT
Patient is a 65y old  Female from home, lives with daughter, ambulates with walker with PMH of recurrent SBO's, s/p exp lap, SB resection in 2015, ex lap, ALBINA in 2018, DVT, PE, on Xarelto, IVC filter, chronic leg swelling, and anasarca, Now send in to the ER by her PCP, Dr. Ross, for evaluation of  generalized weakness and hypotension BP of 80/40 during office visit. On admission, she found to have no fever and BP was in lower normal range and no Leukocytosis. The CXR is clear and CT abd/pelvis consistent with Ileus. The ID consult requested to assist with evaluation of infectious etiology of episode of hypotension. Found to have Bacteroides bacteremia and now developed septic shock on Cefepime and Flagyl. Hence transferred to ICU. 10/20/20.    # Hypotension  at office- BP of 80/40 - resolved   #  Bacteroides fragilis Bacteremia - 10/13/20 - ? source most likely GI- Repeat Blood Cxs have NGTD 10/16/20  # Ileus  - h/o recurrent SBO and s/p EXp. LAp  # COVID 19 negative   # Septic shock ( Hypothermia + hypotensive)- transferred to ICU since requiring pressor- source GI, The CT abd/pelvis shows nonspecific enterocolitis, noninfectious inflammatory bowel disease, or ischemic bowel, along with ileus.   # Pneumonia- HCAP vs Aspiration - on CXR 10/24 and CT chest 10/21- sputum Cx grew Methicillin resistant Staphylococcus aureus  and Stenotrophomonas maltophilia.      would recommend:    1. Obtain Vancomycin level is in AM and give a dose of 500 mg if level is less than 20  2. Continue Levaquin to cover Stenotrophomonas  3. Aspiration precaution  4. Monitor kidney function and adjust Abx doses accordingly   5. Management of Vent as per ICU protocol  6. Need total of 10 days of Levaquin and Vancomycin    d/w ICU team and family at the bed side    Attending Attestation:    Spent more than 45 minutes on total encounter, more than 50 % of the visit was spent counseling and/or coordinating care by the Attending physician.

## 2020-11-02 NOTE — PROGRESS NOTE ADULT - SUBJECTIVE AND OBJECTIVE BOX
Chief Complaint:  Patient is a 65y old  Female who presents with a chief complaint of Hypotension (2020 16:13)      Reason for consult: r/o Budd Chiari Interval Events: Patient was seen and examined at bedside. Remains intubated, but awake, following commands.     Hospital Medications:  chlorhexidine 0.12% Liquid 15 milliLiter(s) Oral Mucosa every 12 hours  chlorhexidine 2% Cloths 1 Application(s) Topical daily  fentaNYL   Infusion. 0.5 MICROgram(s)/kG/Hr IV Continuous <Continuous>  heparin   Injectable 5000 Unit(s) SubCutaneous every 12 hours  lactulose Syrup 30 Gram(s) Oral every 6 hours  levoFLOXacin IVPB 750 milliGRAM(s) IV Intermittent every 24 hours  levoFLOXacin IVPB      midodrine. 10 milliGRAM(s) Oral three times a day  nystatin Powder 1 Application(s) Topical two times a day  pantoprazole  Injectable 40 milliGRAM(s) IV Push two times a day  polyethylene glycol 3350 17 Gram(s) Oral daily  rifAXIMin 550 milliGRAM(s) Oral two times a day  sodium chloride 0.9% lock flush 10 milliLiter(s) IV Push every 1 hour PRN  vancomycin  IVPB          ROS:   Unable to obtain due to patient condition. Denies pain.     PHYSICAL EXAM:   Vital Signs:  Vital Signs Last 24 Hrs  T(C): 36.1 (2020 11:00), Max: 37 (2020 17:00)  T(F): 97 (2020 11:00), Max: 98.6 (2020 17:00)  HR: 97 (2020 13:00) (91 - 108)  BP: 96/70 (2020 13:00) (86/62 - 997/-)  BP(mean): 76 (2020 13:00) (68 - 81)  RR: 25 (2020 13:00) (25 - 39)  SpO2: 99% (2020 13:00) (94% - 100%)  Daily     Daily Weight in k.3 (2020 07:00)    NEURO: opening eyes to verbal stimuli, following command  CHEST: intubated, on vent  CARDIAC: regular rate, rhythm  ABDOMEN: soft, non-tender, non-distended, no rebound or guarding  EXTREMITIES: B/l LE edema      LABS: reviewed                        8.2    10.08 )-----------( 69       ( 2020 06:27 )             24.8         139  |  108  |  20<H>  ----------------------------<  154<H>  4.0   |  23  |  2.00<H>    Ca    7.8<L>      2020 06:27  Phos  2.6       Mg     1.8         TPro  5.0<L>  /  Alb  2.6<L>  /  TBili  5.8<H>  /  DBili  x   /  AST  112<H>  /  ALT  61<H>  /  AlkPhos  282<H>      LIVER FUNCTIONS - ( 2020 06:27 )  Alb: 2.6 g/dL / Pro: 5.0 g/dL / ALK PHOS: 282 U/L / ALT: 61 U/L DA / AST: 112 U/L / GGT: x             Interval Diagnostic Studies: see sunrise for full report

## 2020-11-02 NOTE — PROGRESS NOTE ADULT - ASSESSMENT
AS PER ICU DW DAUGHTER IN LENGTHG       65y Female from home, lives with daughter, ambulates with walker with PMH of recurrent SBO's, s/p exp lap, SB resection in 2015, ex lap, ALBINA in 2018, DVT, PE, on Xarelto, IVC filter, chronic leg swelling, and anasarca, came in to the ED due to hypotension during office visit. Patient was found to be bacteremic with bacteroids fragilis. Admitted in ICU due to hypotension and hypothermia. On vancomycin and levo . BCx X2 negative. Sputum positive for MRSA and Stenotrophomonas.     Diagnosis:  >Encephalopathy  >Sepsis  >Bacteremia  >Anemia  >DVT/PE  >Transaminitis  >Aspiration pneumonia    --------------------------------------------Neuro--------------------------------------  -She is AAOx2 at baseline on evaluation  -Encephalopathic and got intubated on 10/24  -Ammonia trending down from 152 > 130 > 116 >103>131>126>90  -On lactulose 30g q 6h and rifaximin  -hypothermia has been resolved now  -INR>1.63>1.71>1.91  -Pt with multiorgan failure secondary to septic shock  -Hold CT head for now as AMS was most likely due to hepatic encephalopathy and pt didn't have any focal neurological deficit when she started having AMS  -her pupils are reactive to light b/l    -------------------------------------CVS-------------------------------  -Patient is hypotensive secondary septic shock  -On abx Levo, vanc stopped for high vanc trough, will trend vanc trough every morning until vanc trough goes below 20  -NG tube feeding  -Midodrine 10 mg TID  -RIJ for pressors on levophed went down from 0.07 to 0.05  -Hold Lasix home med in the setting of hypotension  -Pt is getting lasix and albumin on and off   -previous ECHO 3-4 months back showed normal EF but new ECHO showed EF 15-20% with severe left ventricular dysfunction  -pt with multiorgan dysfunction sec to septic shock , mixed venous gas from [facility] showed normal O2 saturation , less likely from the low EF   -f/u on autoimmune workup KATHY, anticardiolipin antibodies IgG IgM , ANCA  -Anticardiolipin abs +ve    --------------------------------Respiratory----------------------  -Intubated and sedated  -CT showed mild b/l pleural effusion and left lower lobe consolidation in ICU on day 2  -aspiration pneumonia  - sputum culture grew MRSA and Stenotrophomonas (sensitive to levo)  -On levo and held vanc due to high trough  -f/u repeat sputum culture  -ID Dr. Particio    ----------------------------------------Gastrointestinal--------------------------------------  -Patient was bacteremic with bacteroids fragilis, likely secondary from intra abdominal source.   -Patient had multiple SBO in past. 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 Likely due to sepsis vs hepatic failure sec to budd Chiari  -BCx X 2 negative.  -ammonia trends down from 150 to 130 >116>103>131>126>90, on lactulose and rifaximin   -had 1BM this am, and 1 BM yesterday  -monitor BM  -GI, DR Chatterjee deferred doing colonoscopy due to multiple comorbidities, FOBT positive on 10/15  -Dr Potts recs has been noted, will repeat hepatitis panel     -----------------------------------------ID---------------------------------------  -Patient was bacteremic with bacteroids likely GI source.  -Lactate is normal 1.3 but trends up to 2.3 >3.7  -Procalcitonin 0.79  -On levo and held vanc due to high trough , will trend every day until it gets below 20  - BP on the lower side, pt is on Levophed baby dose and midodrine 10mg TID  -Monitor vitals Q4      ---------------------------------------------Nephro-------------------------------------  -Kidney function is normal, Cl is 120 with  sodium trends down today 147>150.152>152>151>148>147>142>146  -free water deficit of 500ml  -starts on 120 ml q6h  -TF  -urine lytes were sent this morning Fena was calculated 3.1,   -Monitor UO    ----------------------------------------Hem Onc----------------------------------  -Has h/o DVt and PE in past. Patient is on Xarelto at home  -Patient has anemia  -anemia of chronic disease  -Hgb 6.9 1PRBC >8.7 >7 1PRBC> 8.5>1 PRBC> 8.5>9.7>9.6>10> 9.5>8.9>8.4  -Plat trends down but trends up today [id number], no active bleeding  -Hold AC for now, will closely monitor the patient for any bleed  -Folate and B12 normal.   -Dr. Miguel aguilar has been noted, pt started on thiamine for 3 doses.     ----------------------------------------Endo--------------------------------  -HgbA1c 4  -Fs q6h    ----------------------------------------Prophylaxis----------------------------  DVT: hold Lovenox for low platelets   GI: PPI    ---------------------------------------GOC---------------------------  -DNR   -Pt with multi organ failure secondary to septic shock with poor prognosis  -Palliative is on board

## 2020-11-02 NOTE — PROGRESS NOTE ADULT - SUBJECTIVE AND OBJECTIVE BOX
awake now  christel and platelet are improving    ROS:  Negative except for:    MEDICATIONS  (STANDING):  chlorhexidine 0.12% Liquid 15 milliLiter(s) Oral Mucosa every 12 hours  chlorhexidine 2% Cloths 1 Application(s) Topical daily  fentaNYL   Infusion. 0.5 MICROgram(s)/kG/Hr (2.81 mL/Hr) IV Continuous <Continuous>  lactulose Syrup 30 Gram(s) Oral every 6 hours  levoFLOXacin IVPB 750 milliGRAM(s) IV Intermittent every 24 hours  levoFLOXacin IVPB      midodrine. 10 milliGRAM(s) Oral three times a day  nystatin Powder 1 Application(s) Topical two times a day  pantoprazole  Injectable 40 milliGRAM(s) IV Push two times a day  polyethylene glycol 3350 17 Gram(s) Oral daily  rifAXIMin 550 milliGRAM(s) Oral two times a day  vancomycin  IVPB        MEDICATIONS  (PRN):  sodium chloride 0.9% lock flush 10 milliLiter(s) IV Push every 1 hour PRN Pre/post blood products, medications, blood draw, and to maintain line patency      Allergies    No Known Allergies    Intolerances        Vital Signs Last 24 Hrs  T(C): 36.8 (02 Nov 2020 07:00), Max: 37.2 (01 Nov 2020 14:00)  T(F): 98.3 (02 Nov 2020 07:00), Max: 98.9 (01 Nov 2020 14:00)  HR: 92 (02 Nov 2020 09:00) (91 - 108)  BP: 96/74 (02 Nov 2020 09:00) (86/62 - 997/-)  BP(mean): 79 (02 Nov 2020 09:00) (68 - 86)  RR: 36 (02 Nov 2020 09:00) (27 - 39)  SpO2: 100% (02 Nov 2020 09:00) (94% - 100%)    PHYSICAL EXAM  General: adult in NAD  HEENT: clear oropharynx, anicteric sclera, pink conjunctiva  Neck: supple  CV: normal S1/S2 with no murmur rubs or gallops  Lungs: positive air movement b/l ant lungs,clear to auscultation, no wheezes, no rales  Abdomen: soft non-tender non-distended, no hepatosplenomegaly  Ext: no clubbing cyanosis or edema  Skin: no rashes and no petechiae  Neuro: alert and oriented X 4, no focal deficits  LABS:                          8.2    10.08 )-----------( 69       ( 02 Nov 2020 06:27 )             24.8         Mean Cell Volume : 92.9 fl  Mean Cell Hemoglobin : 30.7 pg  Mean Cell Hemoglobin Concentration : 33.1 gm/dL  Auto Neutrophil # : x  Auto Lymphocyte # : x  Auto Monocyte # : x  Auto Eosinophil # : x  Auto Basophil # : x  Auto Neutrophil % : x  Auto Lymphocyte % : x  Auto Monocyte % : x  Auto Eosinophil % : x  Auto Basophil % : x    Serial CBC  Hematocrit 24.8  Hemoglobin 8.2  Plat 69  RBC 2.67  WBC 10.08  Serial CBC  Hematocrit 24.5  Hemoglobin 8.2  Plat 60  RBC 2.63  WBC 7.99  Serial CBC  Hematocrit 20.7  Hemoglobin 6.8  Plat 56  RBC 2.12  WBC 8.41  Serial CBC  Hematocrit 21.0  Hemoglobin 6.8  Plat 56  RBC 2.14  WBC 7.60  Serial CBC  Hematocrit 24.5  Hemoglobin 8.1  Plat 45  RBC 2.55  WBC 7.15  Serial CBC  Hematocrit 24.9  Hemoglobin 8.4  Plat 32  RBC 2.62  WBC 8.37    11-02    139  |  108  |  20<H>  ----------------------------<  154<H>  4.0   |  23  |  2.00<H>    Ca    7.8<L>      02 Nov 2020 06:27  Phos  2.6     11-02  Mg     1.8     11-02    TPro  5.0<L>  /  Alb  2.6<L>  /  TBili  5.8<H>  /  DBili  x   /  AST  112<H>  /  ALT  61<H>  /  AlkPhos  282<H>  11-02                    BLOOD SMEAR INTERPRETATION:       RADIOLOGY & ADDITIONAL STUDIES:

## 2020-11-02 NOTE — PROGRESS NOTE ADULT - ASSESSMENT
64 yo Female with Hx of recurrent SBO`s s/p exp lap, small bowel resection in 2015, ex lap, ALBINA in 2018, DVT, PE (was on Xarelto), IVC filter and s/p supra-hepatic IVC thrombosis/occlusion, anemia, anasarca was sent to ED by PCP for hypotension, generalized weakness and chills and was admitted on 10/13/2020 for hypotension, r/o sepsis , found to have Bacteroides fragilis bacteremia, suspected GI source, was treated with cefepime + metronidazole, remained hypothermic, hypotensive, was transferred to ICU and switched to meropenem on 10/21. Repeat CT 10/21 suggested  non-specific enterocolitis, noninfectious inflammatory bowel disease or ischemic bowel along with ileus, and concern for SBO given mild luminal narrowing at distal small bowel. Patient remained hypotensive, hypothermic, requiring pressor support, septic and became encephalopathic and got intubated on 10/24, also diagnosed with HCAP vs. aspirational PNA, sputum growing MRSA and Stenotrophomonas maltophilia.  She was switched to levofloxacin on 10/27 and now on levo plus vancomycin. 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 Liver enzymes were /are up-trending, along with bilirubin, was suspected due to ongoing sepsis. Patient also noted with cardiomyopathy with EF 10-15%.     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 (as per d/w primary team, it was non-conclusive)  - No caudate lobe hypertrophy reported (present in 75% of Budd Chiari cases), no macroregenerative nodules reported, characteristic pattern of parenchymal perfusion not described,, but study reported limited due to motion artifact   - Venography would be gold standard for diagnosis, discussed earlier with IR, if patient will be more stable (possibly outpatient), can be [facility] at [facility] for Dx and potential intervention, this time patient remains septic on pressor support  - was already on full dose anticoagulation, was being held b/o coagulopathy and now b/o severe thrombocytopenia  - portal vein and IVC were patent on recent imaging  - Repeat US abd was done, but was limited and no comment either on hepatic vein again or on collaterals  - anticardiolipin antibody pos 1x (prior negative 1x)     # Abnormal liver enzymes with hyperbilirubinemia and coagulopathy, now MOF  # Ascites  - Elevated liver enzymes partially due to ongoing sepsis (only mildly elevated or normal on admission and started to worsen when patient condition deteriorated, became hypotensive, hypothermic; AST 46-28-...248...-104-114-112; ALT 70-45...105-..67-61-61; ALP  113 -245-282; Se bi 0.6-1.4-...5.5-6.2-5.8)  - Mgmt. of sepsis per ICU  - Also noted severely reduced LVEF (10-15%) and RV dysfunction, thus hepatic congestion can also contribute (had nl EF in 5/2020)  - Dx paracentesis could be useful (last 2 sets of BCx nl) but as d/w IR, no safe window based on last CT and now small ascites on recent US abdomen  - Prior ascites analysis from 2019: ascites albumin< 0.2, protein < 1; serum albumin 1.0; SAAG< 1.1 (early Budd Chiari would usually give SAAG > 1.1, total protein > 2.5, and in late Budd Chiari SAAG> 1.1 and total protein < 2.5), SAAG < 1.1 with low protein could be due to bowel obstruction/infarction, serositis among others  - Given that still up-trending bilirubin, and coagulopathy, can consider re-sending full liver workup including viral hep panel, EBV, CMV, HSV, VZV, hep E, autoimmune workup, ceruloplasmin (prior liver workup: KATHY neg, AMA neg, HBsAg neg, HBcAb IgM neg, HAV IgM neg)  - Consider checking Factor VIII    # Encephalopathy, possibly multifactorial  Mental status improved, following commands  - c/w lactulose and c/w rifaximin   - consider CT head (was on full dose AC)      # Anemia, thrombocytopenia   - Hematology following, appreciated  - FOBT pos, GI f/u      Will continue to follow  D/w primary team  Thank you for the consult

## 2020-11-03 NOTE — PROGRESS NOTE ADULT - SUBJECTIVE AND OBJECTIVE BOX
sedated  off pressor, BP is s till lowish  still on vent      MEDICATIONS  (STANDING):  chlorhexidine 0.12% Liquid 15 milliLiter(s) Oral Mucosa every 12 hours  chlorhexidine 2% Cloths 1 Application(s) Topical daily  fentaNYL   Infusion. 0.5 MICROgram(s)/kG/Hr (2.81 mL/Hr) IV Continuous <Continuous>  heparin   Injectable 5000 Unit(s) SubCutaneous every 12 hours  lactulose Syrup 30 Gram(s) Oral every 6 hours  levoFLOXacin IVPB 750 milliGRAM(s) IV Intermittent every 24 hours  levoFLOXacin IVPB      midodrine. 10 milliGRAM(s) Oral three times a day  nystatin Powder 1 Application(s) Topical two times a day  pantoprazole  Injectable 40 milliGRAM(s) IV Push two times a day  polyethylene glycol 3350 17 Gram(s) Oral daily  rifAXIMin 550 milliGRAM(s) Oral two times a day  vancomycin  IVPB 500 milliGRAM(s) IV Intermittent every 12 hours  vancomycin  IVPB        MEDICATIONS  (PRN):  sodium chloride 0.9% lock flush 10 milliLiter(s) IV Push every 1 hour PRN Pre/post blood products, medications, blood draw, and to maintain line patency      Allergies    No Known Allergies    Intolerances        Vital Signs Last 24 Hrs  T(C): 36.2 (03 Nov 2020 07:00), Max: 36.3 (02 Nov 2020 23:46)  T(F): 97.1 (03 Nov 2020 07:00), Max: 97.4 (02 Nov 2020 23:46)  HR: 100 (03 Nov 2020 08:19) (91 - 103)  BP: 82/61 (03 Nov 2020 08:00) (82/61 - 122/58)  BP(mean): 66 (03 Nov 2020 08:00) (63 - 79)  RR: 27 (03 Nov 2020 08:00) (25 - 39)  SpO2: 95% (03 Nov 2020 08:19) (90% - 100%)    PHYSICAL EXAM  General: adult in NAD  HEENT: clear oropharynx, anicteric sclera, pink conjunctiva  Neck: supple  CV: normal S1/S2 with no murmur rubs or gallops  Lungs: positive air movement b/l ant lungs,clear to auscultation, no wheezes, no rales  Abdomen: soft non-tender non-distended, no hepatosplenomegaly  Ext: no clubbing cyanosis or edema  Skin: no rashes and no petechiae  Neuro: alert and oriented X 4, no focal deficits  LABS:                          7.9    9.13  )-----------( 75       ( 03 Nov 2020 04:11 )             23.6         Mean Cell Volume : 93.3 fl  Mean Cell Hemoglobin : 31.2 pg  Mean Cell Hemoglobin Concentration : 33.5 gm/dL  Auto Neutrophil # : x  Auto Lymphocyte # : x  Auto Monocyte # : x  Auto Eosinophil # : x  Auto Basophil # : x  Auto Neutrophil % : x  Auto Lymphocyte % : x  Auto Monocyte % : x  Auto Eosinophil % : x  Auto Basophil % : x    Serial CBC  Hematocrit 23.6  Hemoglobin 7.9  Plat 75  RBC 2.53  WBC 9.13  Serial CBC  Hematocrit 24.8  Hemoglobin 8.2  Plat 69  RBC 2.67  WBC 10.08  Serial CBC  Hematocrit 24.5  Hemoglobin 8.2  Plat 60  RBC 2.63  WBC 7.99  Serial CBC  Hematocrit 20.7  Hemoglobin 6.8  Plat 56  RBC 2.12  WBC 8.41  Serial CBC  Hematocrit 21.0  Hemoglobin 6.8  Plat 56  RBC 2.14  WBC 7.60  Serial CBC  Hematocrit 24.5  Hemoglobin 8.1  Plat 45  RBC 2.55  WBC 7.15    11-03    140  |  109<H>  |  25<H>  ----------------------------<  99  3.9   |  22  |  2.06<H>    Ca    7.5<L>      03 Nov 2020 04:11  Phos  2.7     11-03  Mg     1.8     11-03    TPro  4.9<L>  /  Alb  2.4<L>  /  TBili  5.0<H>  /  DBili  x   /  AST  108<H>  /  ALT  58  /  AlkPhos  204<H>  11-03                    BLOOD SMEAR INTERPRETATION:       RADIOLOGY & ADDITIONAL STUDIES:

## 2020-11-03 NOTE — CHART NOTE - NSCHARTNOTEFT_GEN_A_CORE
Assessment:   Patient is a 65y old  Female who presents with a chief complaint of Hypotension (2020 13:42). Pt intubated. TF ordered.   MOF secondary to septic shock with poor prognosis noted. Being followed by Palliative care.       Factors impacting intake: [ ] none [ ] nausea  [ ] vomiting [ ] diarrhea [ ] constipation  [ ]chewing problems [ ] swallowing issues  [ ] other:     Diet Prescription: Diet, NPO with Tube Feed:   Tube Feeding Modality: Nasogastric  Jevity 1.5 Papi  Total Volume for 24 Hours (mL): 960  Continuous  Starting Tube Feed Rate {mL per Hour}: 10  Until Goal Tube Feed Rate (mL per Hour): 40  Tube Feed Duration (in Hours): 24  Tube Feed Start Time: 12:00 (10-26-20 @ 12:07)        Daily     Daily Weight in k.5 (2020 07:00)  Weight in k.3 (2020 07:00)  Weight in k.1 (2020 07:00)  Weight in k.6 (30 Oct 2020 07:30)  Weight in k.8 (29 Oct 2020 07:15)  Weight in k.7 (28 Oct 2020 07:30)  Weight in k.2 (27 Oct 2020 07:00)  Weight in k (26 Oct 2020 07:15)  Weight in k.8 (25 Oct 2020 07:00)  Weight in k.1 (23 Oct 2020 07:00)  Weight in k.6 (22 Oct 2020 07:00)  Weight in k.2 (21 Oct 2020 02:26)    % Weight Change: 3+ dependent edema, I>O noted.    Pertinent Medications: MEDICATIONS  (STANDING):  chlorhexidine 0.12% Liquid 15 milliLiter(s) Oral Mucosa every 12 hours  chlorhexidine 2% Cloths 1 Application(s) Topical daily  dexMEDEtomidine Infusion 0.2 MICROgram(s)/kG/Hr (2.81 mL/Hr) IV Continuous <Continuous>  heparin   Injectable 5000 Unit(s) SubCutaneous every 12 hours  lactulose Syrup 30 Gram(s) Oral every 6 hours  levoFLOXacin IVPB 750 milliGRAM(s) IV Intermittent every 24 hours  levoFLOXacin IVPB      midodrine. 10 milliGRAM(s) Oral three times a day  nystatin Powder 1 Application(s) Topical two times a day  pantoprazole  Injectable 40 milliGRAM(s) IV Push two times a day  polyethylene glycol 3350 17 Gram(s) Oral daily  rifAXIMin 550 milliGRAM(s) Oral two times a day  vancomycin  IVPB 500 milliGRAM(s) IV Intermittent every 12 hours  vancomycin  IVPB        MEDICATIONS  (PRN):  sodium chloride 0.9% lock flush 10 milliLiter(s) IV Push every 1 hour PRN Pre/post blood products, medications, blood draw, and to maintain line patency    Pertinent Labs:  Na140 mmol/L Glu 99 mg/dL K+ 3.9 mmol/L Cr  2.06 mg/dL<H> BUN 25 mg/dL<H>  Phos 2.7 mg/dL  Alb 2.4 g/dL<L> 10-31 Chol --    LDL --    HDL --    Trig 87 mg/dL     CAPILLARY BLOOD GLUCOSE      POCT Blood Glucose.: 79 mg/dL (2020 12:20)  POCT Blood Glucose.: 140 mg/dL (2020 23:22)  POCT Blood Glucose.: 145 mg/dL (2020 17:32)          Previous Nutrition Diagnosis:   [ ] Altered GI function  [ ]Inadequate Oral Intake [ ] Swallowing Difficulty   [ ] Altered nutrition related labs [ ] Increased Nutrient Needs [ ] Overweight/Obesity   [ ] Unintended Weight Loss [ ] Food & Nutrition Related Knowledge Deficit [x ] Malnutrition (severe PCM)   [ ] Other:     Nutrition Diagnosis is [x ] ongoing  [ ] resolved [ ] not applicable         Interventions:   Recommend  [ ] Change Diet To:  [ ] Nutrition Supplement  [x ] Nutrition Support: TF as medically feasible and consistent with goals of care   [ ] Other:     Monitoring and Evaluation:    [ x ] Tolerance to diet prescription [ x ] weights [ x ] labs[ x ] follow up per protocol  [ ] other:

## 2020-11-03 NOTE — PROGRESS NOTE ADULT - ASSESSMENT
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  BC grew bacteroides.    Problem/Recommendation - 1:  Problem: Bacteremia. Recommendation: bacteroides  most likely GI origin  on antibiotics  he has hypothermia and hypotension and elevated lactic acid  in ICU now  off  pressor now  check cortisol level to r/o adrenal insuff  multiorgan failure but improving  christel is trending down  Problem/Recommendation - 2:  ·  Problem: Anemia.  Recommendation: ferritin, b12 folate normal  no hemolysis  most likley due to malnutrition from short bowel syndrome.she also had GIB multiple times before.   Problem/Recommendation - 3:·  Problem: Acute deep vein thrombosis (DVT) of other vein of upper extremity.  Recommendation: she has been on xarelto for 2 years.  DVT at left arm and left leg before.  in all CT scan i did not see report of IVC or hepatic vein thrombosis  off xarelto and lovenox due to elevated PT/PTT4. elevated PT/PTT due to malnutrition and antibiotics causing VITK defnow it is mostly recovered  it begins to rise again, correctable by mixing study  probably due to liver failure this time  she develops DVT very quickly while off AC  need DVT prophylaxis, sq heparin  5. thrombocytopenia  new onset, most likely from sepsis or medication, procal is rising  the other possibilties causing multiorgan failure, including thrombocytopenia,  such as autoimmune, vasculitis, HLH, catastrophic APS(less likely)  platelet is trending up  6. CHF, new onset global hypokinesia,   cardiomyopathy, ?etiology  will give thiamin  7. infiltrates, CHF vs infection  check COVID again

## 2020-11-03 NOTE — PROGRESS NOTE ADULT - ASSESSMENT
AS PER ICU DW DAUGHTER IN LENGTHG   OFF SEDATION     65y Female from home, lives with daughter, ambulates with walker with PMH of recurrent SBO's, s/p exp lap, SB resection in 2015, ex lap, ALBINA in 2018, DVT, PE, on Xarelto, IVC filter, chronic leg swelling, and anasarca, came in to the ED due to hypotension during office visit. Patient was found to be bacteremic with bacteroids fragilis. Admitted in ICU due to hypotension and hypothermia. On vancomycin and levo . BCx X2 negative. Sputum positive for MRSA and Stenotrophomonas.     Diagnosis:  >Encephalopathy  >Sepsis  >Bacteremia  >Anemia  >DVT/PE  >Transaminitis  >Aspiration pneumonia    --------------------------------------------Neuro--------------------------------------  -She is AAOx2 at baseline on evaluation  -Encephalopathic and got intubated on 10/24  -Ammonia trending down from 152 > 130 > 116 >103>131>126>90  -On lactulose 30g q 6h and rifaximin  -hypothermia has been resolved now  -INR>1.63>1.71>1.91  -Pt with multiorgan failure secondary to septic shock  -Hold CT head for now as AMS was most likely due to hepatic encephalopathy and pt didn't have any focal neurological deficit when she started having AMS  -her pupils are reactive to light b/l    -------------------------------------CVS-------------------------------  -Patient is hypotensive secondary septic shock  -On abx Levo, vanc stopped for high vanc trough, will trend vanc trough every morning until vanc trough goes below 20  -NG tube feeding  -Midodrine 10 mg TID  -RIJ for pressors on levophed went down from 0.07 to 0.05  -Hold Lasix home med in the setting of hypotension  -Pt is getting lasix and albumin on and off   -previous ECHO 3-4 months back showed normal EF but new ECHO showed EF 15-20% with severe left ventricular dysfunction  -pt with multiorgan dysfunction sec to septic shock , mixed venous gas from [facility] showed normal O2 saturation , less likely from the low EF   -f/u on autoimmune workup KATHY, anticardiolipin antibodies IgG IgM , ANCA  -Anticardiolipin abs +ve    --------------------------------Respiratory----------------------  -Intubated and sedated  -CT showed mild b/l pleural effusion and left lower lobe consolidation in ICU on day 2  -aspiration pneumonia  - sputum culture grew MRSA and Stenotrophomonas (sensitive to levo)  -On levo and held vanc due to high trough  -f/u repeat sputum culture  -ID Dr. Patricio    ----------------------------------------Gastrointestinal--------------------------------------  -Patient was bacteremic with bacteroids fragilis, likely secondary from intra abdominal source.   -Patient had multiple SBO in past. 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 Likely due to sepsis vs hepatic failure sec to budd Chiari  -BCx X 2 negative.  -ammonia trends down from 150 to 130 >116>103>131>126>90, on lactulose and rifaximin   -had 1BM this am, and 1 BM yesterday  -monitor BM  -GI, DR Chatterjee deferred doing colonoscopy due to multiple comorbidities, FOBT positive on 10/15  -Dr Potts recs has been noted, will repeat hepatitis panel     -----------------------------------------ID---------------------------------------  -Patient was bacteremic with bacteroids likely GI source.  -Lactate is normal 1.3 but trends up to 2.3 >3.7  -Procalcitonin 0.79  -On levo and held vanc due to high trough , will trend every day until it gets below 20  - BP on the lower side, pt is on Levophed baby dose and midodrine 10mg TID  -Monitor vitals Q4      ---------------------------------------------Nephro-------------------------------------  -Kidney function is normal, Cl is 120 with  sodium trends down today 147>150.152>152>151>148>147>142>146  -free water deficit of 500ml  -starts on 120 ml q6h  -TF  -urine lytes were sent this morning Fena was calculated 3.1,   -Monitor UO    ----------------------------------------Hem Onc----------------------------------  -Has h/o DVt and PE in past. Patient is on Xarelto at home  -Patient has anemia  -anemia of chronic disease  -Hgb 6.9 1PRBC >8.7 >7 1PRBC> 8.5>1 PRBC> 8.5>9.7>9.6>10> 9.5>8.9>8.4  -Plat trends down but trends up today [id number]>15230>[id number]>[id number], no active bleeding  -Hold AC for now, will closely monitor the patient for any bleed  -Folate and B12 normal.   -Dr. Miguel aguilar has been noted, pt started on thiamine for 3 doses.     ----------------------------------------Endo--------------------------------  -HgbA1c 4  -Fs q6h    ----------------------------------------Prophylaxis----------------------------  DVT: hold Lovenox for low platelets   GI: PPI    ---------------------------------------GOC---------------------------  -DNR   -Pt with multi organ failure secondary to septic shock with poor prognosis  -Palliative is on board

## 2020-11-03 NOTE — PROGRESS NOTE ADULT - SUBJECTIVE AND OBJECTIVE BOX
Patient was seen and examined  Patient is a 65y old  Female who presents with a chief complaint of Hypotension (03 Nov 2020 08:51)  pt follows comands       INTERVAL HPI/OVERNIGHT EVENTS:  T(C): 36.2 (11-03-20 @ 07:00), Max: 36.3 (11-02-20 @ 23:46)  HR: 99 (11-03-20 @ 10:00) (91 - 103)  BP: 88/64 (11-03-20 @ 10:00) (78/59 - 122/58)  RR: 29 (11-03-20 @ 10:00) (25 - 39)  SpO2: 98% (11-03-20 @ 10:00) (90% - 99%)  Wt(kg): --  I&O's Summary    02 Nov 2020 07:01  -  03 Nov 2020 07:00  --------------------------------------------------------  IN: 2123.2 mL / OUT: 435 mL / NET: 1688.2 mL    03 Nov 2020 07:01  -  03 Nov 2020 11:25  --------------------------------------------------------  IN: 11.2 mL / OUT: 30 mL / NET: -18.8 mL        LABS:                        7.9    9.13  )-----------( 75       ( 03 Nov 2020 04:11 )             23.6     11-03    140  |  109<H>  |  25<H>  ----------------------------<  99  3.9   |  22  |  2.06<H>    Ca    7.5<L>      03 Nov 2020 04:11  Phos  2.7     11-03  Mg     1.8     11-03    TPro  4.9<L>  /  Alb  2.4<L>  /  TBili  5.0<H>  /  DBili  x   /  AST  108<H>  /  ALT  58  /  AlkPhos  204<H>  11-03        CAPILLARY BLOOD GLUCOSE      POCT Blood Glucose.: 140 mg/dL (02 Nov 2020 23:22)  POCT Blood Glucose.: 145 mg/dL (02 Nov 2020 17:32)  POCT Blood Glucose.: 144 mg/dL (02 Nov 2020 11:42)        ABG - ( 03 Nov 2020 08:47 )  pH, Arterial: 7.33  pH, Blood: x     /  pCO2: 41    /  pO2: 123   / HCO3: 21    / Base Excess: -3.8  /  SaO2: 98                    MEDICATIONS  (STANDING):  chlorhexidine 0.12% Liquid 15 milliLiter(s) Oral Mucosa every 12 hours  chlorhexidine 2% Cloths 1 Application(s) Topical daily  fentaNYL   Infusion. 0.5 MICROgram(s)/kG/Hr (2.81 mL/Hr) IV Continuous <Continuous>  heparin   Injectable 5000 Unit(s) SubCutaneous every 12 hours  lactulose Syrup 30 Gram(s) Oral every 6 hours  levoFLOXacin IVPB 750 milliGRAM(s) IV Intermittent every 24 hours  levoFLOXacin IVPB      midodrine. 10 milliGRAM(s) Oral three times a day  nystatin Powder 1 Application(s) Topical two times a day  pantoprazole  Injectable 40 milliGRAM(s) IV Push two times a day  polyethylene glycol 3350 17 Gram(s) Oral daily  rifAXIMin 550 milliGRAM(s) Oral two times a day  vancomycin  IVPB 500 milliGRAM(s) IV Intermittent every 12 hours  vancomycin  IVPB        MEDICATIONS  (PRN):  sodium chloride 0.9% lock flush 10 milliLiter(s) IV Push every 1 hour PRN Pre/post blood products, medications, blood draw, and to maintain line patency      RADIOLOGY & ADDITIONAL TESTS:    Imaging Personally Reviewed:  [ ] YES  [ ] NO    REVIEW OF SYSTEMS:  on  vent follows [facility]      Consultant(s) Notes Reviewed:  [ ] YES  [ ] NO    PHYSICAL EXAM:  GENERAL: NAD, well-groomed, well-developed  HEAD:  Atraumatic, Normocephalic  EYES: EOMI, PERRLA, conjunctiva and sclera clear  ENMT: No tonsillar erythema, exudates, or enlargement; Moist mucous membranes, Good dentition, No lesions  NECK: Supple, No JVD, Normal thyroid  NERVOUS SYSTEM:  on  vent follows comands  CHEST/LUNG: Clear to percussion bilaterally; No rales, rhonchi, wheezing, or rubs  HEART: Regular rate and rhythm; No murmurs, rubs, or gallops  ABDOMEN: Soft, Nontender, Nondistended; Bowel sounds present  EXTREMITIES:  2+ Peripheral Pulses, No clubbing, cyanosis, or edema  LYMPH: No lymphadenopathy noted  SKIN: No rashes or lesions    Care Discussed with Consultants/Other Providers [ x] YES  [ ] NO

## 2020-11-03 NOTE — PROGRESS NOTE ADULT - ASSESSMENT
Patient is a 65y old  Female from home, lives with daughter, ambulates with walker with PMH of recurrent SBO's, s/p exp lap, SB resection in 2015, ex lap, ALBINA in 2018, DVT, PE, on Xarelto, IVC filter, chronic leg swelling, and anasarca, Now send in to the ER by her PCP, Dr. Ross, for evaluation of  generalized weakness and hypotension BP of 80/40 during office visit. On admission, she found to have no fever and BP was in lower normal range and no Leukocytosis. The CXR is clear and CT abd/pelvis consistent with Ileus. The ID consult requested to assist with evaluation of infectious etiology of episode of hypotension. Found to have Bacteroides bacteremia and now developed septic shock on Cefepime and Flagyl. Hence transferred to ICU. 10/20/20.    # Hypotension  at office- BP of 80/40 - resolved   #  Bacteroides fragilis Bacteremia - 10/13/20 - ? source most likely GI- Repeat Blood Cxs have NGTD 10/16/20  # Ileus  - h/o recurrent SBO and s/p EXp. LAp  # COVID 19 negative   # Septic shock ( Hypothermia + hypotensive)- transferred to ICU since requiring pressor- source GI, The CT abd/pelvis shows nonspecific enterocolitis, noninfectious inflammatory bowel disease, or ischemic bowel, along with ileus.   # Pneumonia- HCAP vs Aspiration - on CXR 10/24 and CT chest 10/21- sputum Cx grew Methicillin resistant Staphylococcus aureus  and Stenotrophomonas maltophilia.      would recommend:    1. Please monitor Vancomycin level and adjust doses accordingly to keep level between 15 to 20  2. Continue Levaquin to cover Stenotrophomonas  3. Aspiration precaution  4. Monitor kidney function and adjust Abx doses accordingly   5. Management of Vent as per ICU protocol      d/w ICU team       Attending Attestation:    Spent more than 45 minutes on total encounter, more than 50 % of the visit was spent counseling and/or coordinating care by the Attending physician.

## 2020-11-03 NOTE — PROGRESS NOTE ADULT - SUBJECTIVE AND OBJECTIVE BOX
INTERVAL HPI/OVERNIGHT EVENTS: ***    PRESSORS: [ ] YES [ ] NO  WHICH:    ANTIBIOTICS:                  DATE STARTED:  ANTIBIOTICS:                  DATE STARTED:  ANTIBIOTICS:                  DATE STARTED:    Antimicrobial:  levoFLOXacin IVPB 750 milliGRAM(s) IV Intermittent every 24 hours  levoFLOXacin IVPB      rifAXIMin 550 milliGRAM(s) Oral two times a day  vancomycin  IVPB 500 milliGRAM(s) IV Intermittent every 12 hours  vancomycin  IVPB        Cardiovascular:  midodrine. 10 milliGRAM(s) Oral three times a day    Pulmonary:    Hematalogic:  heparin   Injectable 5000 Unit(s) SubCutaneous every 12 hours    Other:  chlorhexidine 0.12% Liquid 15 milliLiter(s) Oral Mucosa every 12 hours  chlorhexidine 2% Cloths 1 Application(s) Topical daily  dexMEDEtomidine Infusion 0.2 MICROgram(s)/kG/Hr IV Continuous <Continuous>  lactulose Syrup 30 Gram(s) Oral every 6 hours  nystatin Powder 1 Application(s) Topical two times a day  pantoprazole  Injectable 40 milliGRAM(s) IV Push two times a day  polyethylene glycol 3350 17 Gram(s) Oral daily  sodium chloride 0.9% lock flush 10 milliLiter(s) IV Push every 1 hour PRN    chlorhexidine 0.12% Liquid 15 milliLiter(s) Oral Mucosa every 12 hours  chlorhexidine 2% Cloths 1 Application(s) Topical daily  dexMEDEtomidine Infusion 0.2 MICROgram(s)/kG/Hr IV Continuous <Continuous>  heparin   Injectable 5000 Unit(s) SubCutaneous every 12 hours  lactulose Syrup 30 Gram(s) Oral every 6 hours  levoFLOXacin IVPB 750 milliGRAM(s) IV Intermittent every 24 hours  levoFLOXacin IVPB      midodrine. 10 milliGRAM(s) Oral three times a day  nystatin Powder 1 Application(s) Topical two times a day  pantoprazole  Injectable 40 milliGRAM(s) IV Push two times a day  polyethylene glycol 3350 17 Gram(s) Oral daily  rifAXIMin 550 milliGRAM(s) Oral two times a day  sodium chloride 0.9% lock flush 10 milliLiter(s) IV Push every 1 hour PRN  vancomycin  IVPB 500 milliGRAM(s) IV Intermittent every 12 hours  vancomycin  IVPB        Drug Dosing Weight  Height (cm): 167.6 (21 Oct 2020 02:26)  Weight (kg): 56.2 (21 Oct 2020 02:26)  BMI (kg/m2): 20 (21 Oct 2020 02:26)  BSA (m2): 1.63 (21 Oct 2020 02:26)    CENTRAL LINE: [ ] YES [ ] NO  LOCATION:         TREJO: [ ] YES [ ] NO          A-LINE:  [ ] YES [ ] NO  LOCATION:             ICU Vital Signs Last 24 Hrs  T(C): 36.2 (03 Nov 2020 07:00), Max: 36.3 (02 Nov 2020 23:46)  T(F): 97.1 (03 Nov 2020 07:00), Max: 97.4 (02 Nov 2020 23:46)  HR: 93 (03 Nov 2020 13:00) (91 - 103)  BP: 86/62 (03 Nov 2020 13:00) (78/59 - 122/58)  BP(mean): 68 (03 Nov 2020 13:00) (63 - 78)  ABP: --  ABP(mean): --  RR: 18 (03 Nov 2020 13:00) (18 - 39)  SpO2: 97% (03 Nov 2020 13:00) (90% - 99%)      ABG - ( 03 Nov 2020 08:47 )  pH, Arterial: 7.33  pH, Blood: x     /  pCO2: 41    /  pO2: 123   / HCO3: 21    / Base Excess: -3.8  /  SaO2: 98                    11-02 @ 07:01  -  11-03 @ 07:00  --------------------------------------------------------  IN: 2123.2 mL / OUT: 435 mL / NET: 1688.2 mL        Mode: AC/ CMV (Assist Control/ Continuous Mandatory Ventilation)  RR (machine): 24  TV (machine): 375  FiO2: 40  PEEP: 5  ITime: 1  MAP: 12  PIP: 2      REVIEW OF SYSTEMS:    CONSTITUTIONAL: No weakness, fevers or chills  NECK: No pain or stiffness  RESPIRATORY: No cough, wheezing, hemoptysis; No shortness of breath  CARDIOVASCULAR: No chest pain or palpitations  GASTROINTESTINAL: No abdominal or epigastric pain. No nausea, vomiting, No diarrhea or constipation. No melena or hematochezia.  GENITOURINARY: No dysuria, frequency or hematuria  NEUROLOGICAL: No numbness or weakness  All other review of systems is negative unless indicated above      PHYSICAL EXAM:    GENERAL: NAD, well-groomed, well-developed  EYES: EOMI, PERRLA,   NECK: Supple, No JVD; Normal thyroid; Trachea midline  NERVOUS SYSTEM:  Alert & Oriented X3,  Motor Strength 5/5 B/L upper and lower extremities; DTRs 2+ intact and symmetric  CHEST/LUNG: No rales, rhonchi, wheezing   HEART: Regular rate and rhythm; No murmurs,   ABDOMEN: Soft, Nontender, Nondistended; Bowel sounds present  EXTREMITIES:  2+ Peripheral Pulses, No clubbing, cyanosis, or edema        LABS:  CBC Full  -  ( 03 Nov 2020 04:11 )  WBC Count : 9.13 K/uL  RBC Count : 2.53 M/uL  Hemoglobin : 7.9 g/dL  Hematocrit : 23.6 %  Platelet Count - Automated : 75 K/uL  Mean Cell Volume : 93.3 fl  Mean Cell Hemoglobin : 31.2 pg  Mean Cell Hemoglobin Concentration : 33.5 gm/dL  Auto Neutrophil # : x  Auto Lymphocyte # : x  Auto Monocyte # : x  Auto Eosinophil # : x  Auto Basophil # : x  Auto Neutrophil % : x  Auto Lymphocyte % : x  Auto Monocyte % : x  Auto Eosinophil % : x  Auto Basophil % : x    11-03    140  |  109<H>  |  25<H>  ----------------------------<  99  3.9   |  22  |  2.06<H>    Ca    7.5<L>      03 Nov 2020 04:11  Phos  2.7     11-03  Mg     1.8     11-03    TPro  4.9<L>  /  Alb  2.4<L>  /  TBili  5.0<H>  /  DBili  x   /  AST  108<H>  /  ALT  58  /  AlkPhos  204<H>  11-03            RADIOLOGY & ADDITIONAL STUDIES REVIEWED:  ***    [ ]GOALS OF CARE DISCUSSION WITH PATIENT/FAMILY/PROXY:    CRITICAL CARE TIME SPENT: 35 minutes INTERVAL HPI/OVERNIGHT EVENTS:   patient failed SBT today   patient with mucus plaque on the left side        PRESSORS: [ ] YES [ ] NO  WHICH:    ANTIBIOTICS:                  DATE STARTED:  ANTIBIOTICS:                  DATE STARTED:  ANTIBIOTICS:                  DATE STARTED:    Antimicrobial:  levoFLOXacin IVPB 750 milliGRAM(s) IV Intermittent every 24 hours  levoFLOXacin IVPB      rifAXIMin 550 milliGRAM(s) Oral two times a day  vancomycin  IVPB 500 milliGRAM(s) IV Intermittent every 12 hours  vancomycin  IVPB        Cardiovascular:  midodrine. 10 milliGRAM(s) Oral three times a day    Pulmonary:    Hematalogic:  heparin   Injectable 5000 Unit(s) SubCutaneous every 12 hours    Other:  chlorhexidine 0.12% Liquid 15 milliLiter(s) Oral Mucosa every 12 hours  chlorhexidine 2% Cloths 1 Application(s) Topical daily  dexMEDEtomidine Infusion 0.2 MICROgram(s)/kG/Hr IV Continuous <Continuous>  lactulose Syrup 30 Gram(s) Oral every 6 hours  nystatin Powder 1 Application(s) Topical two times a day  pantoprazole  Injectable 40 milliGRAM(s) IV Push two times a day  polyethylene glycol 3350 17 Gram(s) Oral daily  sodium chloride 0.9% lock flush 10 milliLiter(s) IV Push every 1 hour PRN    chlorhexidine 0.12% Liquid 15 milliLiter(s) Oral Mucosa every 12 hours  chlorhexidine 2% Cloths 1 Application(s) Topical daily  dexMEDEtomidine Infusion 0.2 MICROgram(s)/kG/Hr IV Continuous <Continuous>  heparin   Injectable 5000 Unit(s) SubCutaneous every 12 hours  lactulose Syrup 30 Gram(s) Oral every 6 hours  levoFLOXacin IVPB 750 milliGRAM(s) IV Intermittent every 24 hours  levoFLOXacin IVPB      midodrine. 10 milliGRAM(s) Oral three times a day  nystatin Powder 1 Application(s) Topical two times a day  pantoprazole  Injectable 40 milliGRAM(s) IV Push two times a day  polyethylene glycol 3350 17 Gram(s) Oral daily  rifAXIMin 550 milliGRAM(s) Oral two times a day  sodium chloride 0.9% lock flush 10 milliLiter(s) IV Push every 1 hour PRN  vancomycin  IVPB 500 milliGRAM(s) IV Intermittent every 12 hours  vancomycin  IVPB        Drug Dosing Weight  Height (cm): 167.6 (21 Oct 2020 02:26)  Weight (kg): 56.2 (21 Oct 2020 02:26)  BMI (kg/m2): 20 (21 Oct 2020 02:26)  BSA (m2): 1.63 (21 Oct 2020 02:26)    CENTRAL LINE: [ ] YES [ ] NO  LOCATION:         TREJO: [ ] YES [ ] NO          A-LINE:  [ ] YES [ ] NO  LOCATION:             ICU Vital Signs Last 24 Hrs  T(C): 36.2 (03 Nov 2020 07:00), Max: 36.3 (02 Nov 2020 23:46)  T(F): 97.1 (03 Nov 2020 07:00), Max: 97.4 (02 Nov 2020 23:46)  HR: 93 (03 Nov 2020 13:00) (91 - 103)  BP: 86/62 (03 Nov 2020 13:00) (78/59 - 122/58)  BP(mean): 68 (03 Nov 2020 13:00) (63 - 78)  ABP: --  ABP(mean): --  RR: 18 (03 Nov 2020 13:00) (18 - 39)  SpO2: 97% (03 Nov 2020 13:00) (90% - 99%)      ABG - ( 03 Nov 2020 08:47 )  pH, Arterial: 7.33  pH, Blood: x     /  pCO2: 41    /  pO2: 123   / HCO3: 21    / Base Excess: -3.8  /  SaO2: 98                    11-02 @ 07:01  -  11-03 @ 07:00  --------------------------------------------------------  IN: 2123.2 mL / OUT: 435 mL / NET: 1688.2 mL        Mode: AC/ CMV (Assist Control/ Continuous Mandatory Ventilation)  RR (machine): 24  TV (machine): 375  FiO2: 40  PEEP: 5  ITime: 1  MAP: 12  PIP: 2      PHYSICAL EXAM:    GENERAL: Intubated and sedated + ETT  HEAD:  Atraumatic, Normocephalic  EYES: b/l pupil reactive to light.   NECK: Supple, normal appearance, No JVD; Normal thyroid; Trachea midline  NERVOUS SYSTEM:  Alert & Oriented X0  CHEST/LUNG: equal b/l air entry and rhonchi on auscultation  HEART: Regular rate and rhythm; No murmurs, rubs, or gallops  ABDOMEN: Soft, Nontender, mildly distended; Bowel sounds present  EXTREMITIES:  2+ Peripheral Pulses, No clubbing, cyanosis, or edema  LYMPH: No lymphadenopathy noted  SKIN: No rashes or lesions; Good capillary refill          LABS:  CBC Full  -  ( 03 Nov 2020 04:11 )  WBC Count : 9.13 K/uL  RBC Count : 2.53 M/uL  Hemoglobin : 7.9 g/dL  Hematocrit : 23.6 %  Platelet Count - Automated : 75 K/uL  Mean Cell Volume : 93.3 fl  Mean Cell Hemoglobin : 31.2 pg  Mean Cell Hemoglobin Concentration : 33.5 gm/dL  Auto Neutrophil # : x  Auto Lymphocyte # : x  Auto Monocyte # : x  Auto Eosinophil # : x  Auto Basophil # : x  Auto Neutrophil % : x  Auto Lymphocyte % : x  Auto Monocyte % : x  Auto Eosinophil % : x  Auto Basophil % : x    11-03    140  |  109<H>  |  25<H>  ----------------------------<  99  3.9   |  22  |  2.06<H>    Ca    7.5<L>      03 Nov 2020 04:11  Phos  2.7     11-03  Mg     1.8     11-03    TPro  4.9<L>  /  Alb  2.4<L>  /  TBili  5.0<H>  /  DBili  x   /  AST  108<H>  /  ALT  58  /  AlkPhos  204<H>  11-03            RADIOLOGY & ADDITIONAL STUDIES REVIEWED:  ***    [ ]GOALS OF CARE DISCUSSION WITH PATIENT/FAMILY/PROXY:    CRITICAL CARE TIME SPENT: 35 minutes

## 2020-11-03 NOTE — PROGRESS NOTE ADULT - ASSESSMENT
65y Female from home, lives with daughter, ambulates with walker with PMH of recurrent SBO's, s/p exp lap, SB resection in 2015, ex lap, ALBINA in 2018, DVT, PE, on Xarelto, IVC filter, chronic leg swelling, and anasarca, came in to the ED due to hypotension during office visit. Patient was found to be bacteremic with bacteroids fragilis. Admitted in ICU due to hypotension and hypothermia. On vancomycin and levo . BCx X2 negative. Sputum positive for MRSA and Stenotrophomonas.     Diagnosis:  >Encephalopathy  >Sepsis  >Bacteremia  >Anemia  >DVT/PE  >Transaminitis  >Aspiration pneumonia    --------------------------------------------Neuro--------------------------------------  -She is AAOx2 at baseline on evaluation  -Encephalopathic and got intubated on 10/24  -Ammonia trending down from 152 > 130 > 116 >103>131>126>90>77  -On lactulose 30g q 6h and rifaximin  -hypothermia has been resolved now  -INR>1.63>1.71>1.91>1.98  -Pt with multiorgan failure secondary to septic shock  -Hold CT head for now as AMS was most likely due to hepatic encephalopathy and pt didn't have any focal neurological deficit when she started having AMS  -her pupils are reactive to light b/l, patient can follow minimal commands       -------------------------------------CVS-------------------------------  -Patient is hypotensive secondary septic shock  -On abx Levo, vanc stopped previousely  for high vanc trough, recent vanc trough 21.2 m, will start on vancomycin 500mg qd for now with monitoring vanc trough   -NG tube feeding  -Midodrine 10 mg TID  -RIJ for pressors on levophed went down from 0.07 to 0.05  -Hold Lasix home med in the setting of hypotension  -Pt is getting lasix and albumin on and off   -previous ECHO 3-4 months back showed normal EF but new ECHO showed EF 15-20% with severe left ventricular dysfunction  -pt with multiorgan dysfunction sec to septic shock , mixed venous gas from [facility] showed normal O2 saturation , less likely from the low EF   -f/u on autoimmune workup KATHY, anticardiolipin antibodies IgG IgM , ANCA  -Anticardiolipin abs +ve    --------------------------------Respiratory----------------------  -Intubated and sedated  -CT showed mild b/l pleural effusion and left lower lobe consolidation in ICU on day 2  -aspiration pneumonia  - sputum culture grew MRSA and Stenotrophomonas (sensitive to levo)  -On levo and 500mg vancomycin   -f/u repeat sputum culture  -ID Dr. Patricio    ----------------------------------------Gastrointestinal--------------------------------------  -Patient was bacteremic with bacteroids fragilis, likely secondary from intra abdominal source.   -Patient had multiple SBO in past. 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 Likely due to sepsis vs hepatic failure sec to budd Chiari  -BCx X 2 negative.  -ammonia trends down from 150 to 130 >116>103>131>126>90> 77, on lactulose and rifaximin   -monitor BM  -GI, DR Chatterjee deferred doing colonoscopy due to multiple comorbidities, FOBT positive on 10/15  -Dr Potts recs has been noted, will repeat hepatitis panel     -----------------------------------------ID---------------------------------------  -Patient was bacteremic with bacteroids likely GI source.  -Lactate is normal 1.3 but trends up to 2.3 >3.7  -Procalcitonin 0.79  -On levo and held vanc due to high trough , will trend every day until it gets below 20  - BP on the lower side, pt is on Levophed baby dose and midodrine 10mg TID  -Monitor vitals Q4      ---------------------------------------------Nephro-------------------------------------  -Kidney function is normal, Cl is 120 with  sodium trends down today 147>150.152>152>151>148>147>142>146> 139  -free water deficit of 500ml  -starts on 120 ml q6h  -TF  -urine lytes were sent this morning Fena was calculated 3.1,   -Monitor UO, decreased UO with anasarca edema     ----------------------------------------Hem Onc----------------------------------  -Has h/o DVt and PE in past. Patient is on Xarelto at home  -Patient has anemia  -anemia of chronic disease  -Hgb 6.8>1PRBC>8.2  -Plat trends down but trends up today to 60K , no active bleeding  - will start on prophylactic dose Ac Heparin 5000 q12 hrs per dr rivera recommendation   -Folate and B12 normal.   -Dr. Rivera recs has been noted, pt started on thiamine for 3 doses.     ----------------------------------------Endo--------------------------------  -HgbA1c 4  -Fs q6h    ----------------------------------------Prophylaxis----------------------------  DVT: heparin sq 5000 q12 hrs  GI: PPI    ---------------------------------------GOC---------------------------  -DNR   -Pt with multi organ failure secondary to septic shock with poor prognosis  -Palliative is on board       65y Female from home, lives with daughter, ambulates with walker with PMH of recurrent SBO's, s/p exp lap, SB resection in 2015, ex lap, ALBINA in 2018, DVT, PE, on Xarelto, IVC filter, chronic leg swelling, and anasarca, came in to the ED due to hypotension during office visit. Patient was found to be bacteremic with bacteroids fragilis. Admitted in ICU due to hypotension and hypothermia. On vancomycin and levo . BCx X2 negative. Sputum positive for MRSA and Stenotrophomonas.     Diagnosis:  >Encephalopathy  >Sepsis  >Bacteremia  >Anemia  >DVT/PE  >Transaminitis  >Aspiration pneumonia    --------------------------------------------Neuro--------------------------------------  -She is AAOx2 at baseline on evaluation  -Encephalopathic and got intubated on 10/24  -Ammonia trending down from 152 > 130 > 116 >103>131>126>90>77  -On lactulose 30g q 6h and rifaximin  -hypothermia has been resolved now  -INR>1.63>1.71>1.91>1.98  -Pt with multiorgan failure secondary to septic shock  -Hold CT head for now as AMS was most likely due to hepatic encephalopathy and pt didn't have any focal neurological deficit when she started having AMS  -her pupils are reactive to light b/l, patient can follow minimal commands       -------------------------------------CVS-------------------------------  -Patient is hypotensive secondary septic shock  -On abx Levo, vanc stopped previousely  for high vanc trough, recent vanc trough 21.2 m, will start on vancomycin 500mg qd for now with monitoring vanc trough   -NG tube feeding  -Midodrine 10 mg TID  -RIJ for pressors on levophed went down from 0.07 to 0.05  -Hold Lasix home med in the setting of hypotension  -Pt is getting lasix and albumin on and off   -previous ECHO 3-4 months back showed normal EF but new ECHO showed EF 15-20% with severe left ventricular dysfunction  -pt with multiorgan dysfunction sec to septic shock , mixed venous gas from [facility] showed normal O2 saturation , less likely from the low EF   -f/u on autoimmune workup KATHY, anticardiolipin antibodies IgG IgM , ANCA  -Anticardiolipin abs +ve  - will diuresis daily with Lasix and albumin     --------------------------------Respiratory----------------------  -Intubated and sedated  -CT showed mild b/l pleural effusion and left lower lobe consolidation in ICU on day 2  -aspiration pneumonia  - sputum culture grew MRSA and Stenotrophomonas (sensitive to levo)  -On levo and 500mg vancomycin   -f/u repeat sputum culture  -ID Dr. Patricio    ----------------------------------------Gastrointestinal--------------------------------------  -Patient was bacteremic with bacteroids fragilis, likely secondary from intra abdominal source.   -Patient had multiple SBO in past. 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 Likely due to sepsis vs hepatic failure sec to budd Chiari  -BCx X 2 negative.  -ammonia trends down from 150 to 130 >116>103>131>126>90> 77, on lactulose and rifaximin   -monitor BM  -GI, DR Chatterjee deferred doing colonoscopy due to multiple comorbidities, FOBT positive on 10/15  -Dr Csak recs has been noted,    -----------------------------------------ID---------------------------------------  -Patient was bacteremic with bacteroids likely GI source.  -Lactate is normal 1.3 but trends up to 2.3 >3.7  -Procalcitonin 0.79  -On levo and held vanc due to high trough , will trend every day until it gets below 20  - BP on the lower side, pt is on Levophed baby dose and midodrine 10mg TID  -Monitor vitals Q4      ---------------------------------------------Nephro-------------------------------------  -Kidney function is normal, Cl is 120 with  sodium trends down today 147>150.152>152>151>148>147>142>146> 139  -free water deficit of 500ml  -starts on 120 ml q6h  -TF  -urine lytes were sent this morning Fena was calculated 3.1,   -Monitor UO, decreased UO with anasarca edema     ----------------------------------------Hem Onc----------------------------------  -Has h/o DVt and PE in past. Patient is on Xarelto at home  -Patient has anemia  -anemia of chronic disease  -Hgb 6.8>1PRBC>8.2  -Plat trends down but trends up today to 60K , no active bleeding  - will start on prophylactic dose Ac Heparin 5000 q12 hrs per dr rivera recommendation   -Folate and B12 normal.   -Dr. Rivera recs has been noted, pt started on thiamine for 3 doses.   - will check INR daily     ----------------------------------------Endo--------------------------------  -HgbA1c 4  -Fs q6h    ----------------------------------------Prophylaxis----------------------------  DVT: heparin sq 5000 q12 hrs  GI: PPI    ---------------------------------------GOC---------------------------  -DNR   -Pt with multi organ failure secondary to septic shock with poor prognosis  -Palliative is on board

## 2020-11-03 NOTE — PROGRESS NOTE ADULT - SUBJECTIVE AND OBJECTIVE BOX
Patient is seen and examined at the bed side, remains normothermic, on vent. and OFF pressor.  The creatinine stay elevated.       REVIEW OF SYSTEMS: Unable to obtain due to mental status         ALLERGIES: No Known Allergies        ICU Vital Signs Last 24 Hrs  T(C): 36.7 (03 Nov 2020 16:00), Max: 36.7 (03 Nov 2020 16:00)  T(F): 98 (03 Nov 2020 16:00), Max: 98 (03 Nov 2020 16:00)  HR: 92 (03 Nov 2020 16:11) (91 - 103)  BP: 58/43 (03 Nov 2020 16:00) (58/43 - 122/58)  BP(mean): 46 (03 Nov 2020 16:00) (46 - 75)  ABP: --  ABP(mean): --  RR: 30 (03 Nov 2020 16:00) (18 - 38)  SpO2: 100% (03 Nov 2020 16:11) (90% - 100%)      PHYSICAL EXAM:  GENERAL: Intubated /vented,   CHEST/LUNG: Not using accessory muscles   HEART: s1 and s2 present  ABDOMEN:  Nontender and  Nondistended  EXTREMITIES: B/L UE edematous  CNS: Intubated/vented, awake          LABS:                        7.9    9.13  )-----------( 75       ( 03 Nov 2020 04:11 )             23.6                           8.2    10.08 )-----------( 69       ( 02 Nov 2020 06:27 )             24.8                11-03    140  |  109<H>  |  25<H>  ----------------------------<  99  3.9   |  22  |  2.06<H>    Ca    7.5<L>      03 Nov 2020 04:11  Phos  2.7     11-03  Mg     1.8     11-03    TPro  4.9<L>  /  Alb  2.4<L>  /  TBili  5.0<H>  /  DBili  x   /  AST  108<H>  /  ALT  58  /  AlkPhos  204<H>  11-03    11-02    139  |  108  |  20<H>  ----------------------------<  154<H>  4.0   |  23  |  2.00<H>    Ca    7.8<L>      02 Nov 2020 06:27  Phos  2.6     11-02  Mg     1.8     11-02    TPro  5.0<L>  /  Alb  2.6<L>  /  TBili  5.8<H>  /  DBili  x   /  AST  112<H>  /  ALT  61<H>  /  AlkPhos  282<H>  11-02 11-01    140  |  109<H>  |  17  ----------------------------<  113<H>  4.2   |  24  |  1.85<H>    Ca    7.8<L>      01 Nov 2020 06:34  Phos  2.6     11-01  Mg     1.9     11-01    TPro  5.0<L>  /  Alb  2.6<L>  /  TBili  5.6<H>  /  DBili  x   /  AST  123<H>  /  ALT  62<H>  /  AlkPhos  316<H>  11-01      Vancomycin Level, Trough (11.03.20 @ 04:11)   Vancomycin Level, Trough: 16.1:    Vancomycin Level, Trough (11.01.20 @ 04:59)   Vancomycin Level, Trough: 20.9:    Vancomycin Level, Trough (10.31.20 @ 06:54)   Vancomycin Level, Trough: 20.5:    Vancomycin Level, Trough (10.30.20 @ 06:30)   Vancomycin Level, Trough: 28.1:    Vancomycin Level, Trough (10.29.20 @ 05:11)   Vancomycin Level, Trough: 36.3    Procalcitonin, Serum (10.15.20 @ 11:48)   Procalcitonin, Serum: 0.16: Procalcitonin (PCT) Interpretation (ng/mL) - Diagnosis of systemic   bacterial infection/sepsis         MEDICATIONS  (STANDING):    albumin human 25% IVPB 50 milliLiter(s) IV Intermittent every 6 hours  chlorhexidine 0.12% Liquid 15 milliLiter(s) Oral Mucosa every 12 hours  chlorhexidine 2% Cloths 1 Application(s) Topical daily  dexMEDEtomidine Infusion 0.2 MICROgram(s)/kG/Hr (2.81 mL/Hr) IV Continuous <Continuous>  heparin   Injectable 5000 Unit(s) SubCutaneous every 12 hours  lactulose Syrup 30 Gram(s) Oral every 6 hours  levoFLOXacin IVPB 750 milliGRAM(s) IV Intermittent every 24 hours  levoFLOXacin IVPB      midodrine. 10 milliGRAM(s) Oral three times a day  nystatin Powder 1 Application(s) Topical two times a day  pantoprazole  Injectable 40 milliGRAM(s) IV Push two times a day  polyethylene glycol 3350 17 Gram(s) Oral daily  rifAXIMin 550 milliGRAM(s) Oral two times a day        RADIOLOGY & ADDITIONAL TESTS:    10/25/20: Xray Chest 1 View- PORTABLE-Routine (Xray Chest 1 View- PORTABLE-Routine in AM.) (10.25.20 @ 09:21) Frontal expiratory view of the chest shows the heart to be similar in size. Endotracheal tube, right jugular line and feeding tube remain present.    The lungs show similar left upper lobe infiltrate with progression of right perihilar infiltrate. Pleural effusions are similar. There is no evidence of pneumothorax.      10/23/20 : Xray Chest 1 View-PORTABLE IMMEDIATE (Xray Chest 1 View-PORTABLE IMMEDIATE .) (10.23.20 @ 19:09) Feeding tube tip near GE junction as noted. Questionable right upper lobe infiltrate. Follow-up study is recommended as clinically warranted.      10/21/20 : CT Abdomen and Pelvis w/ Oral Cont and w/ IV Cont (10.21.20 @ 15:59) Mural thickening of the left colon and rectum. Liquid stool in the colon. Apparent segmental mural thickening of the mid to distal small bowel and the proximal duodenum. Findings may represent nonspecific enterocolitis, noninfectious inflammatory bowel disease, or ischemic bowel. Clinical correlation is recommended. The celiac axis artery, SMA, and KINA are patent without stenosis.    Dilatation of the mid small bowel is again noted. Oral contrast has reached the terminal ileum. Findings may represent small bowel obstruction, or ileus related to nonspecific enterocolitis.   Cholelithiasis.    Moderate to large ascites in the abdomen, increased since the previous examination. 5 mm nonspecific noncalcified left upper lobe lung nodule; if the patient's is in the high risk category (i.e. smoker), follow-up chest CT may be pursued in 12 months to ensure stability.    Combination of atelectasis and consolidation in the left lower lobe.    Possible 1.0 cm hypodense lesion in the left lobe of the thyroid. Thyroid ultrasound may be pursued for further evaluation.   Mild bilateral pleural effusions.    Aging determinate compression fracture at T5 vertebra.        10/13/20 : CT Abdomen and Pelvis w/ IV Cont (10.13.20 @ 18:50) Diffuse dilatation of small bowel loops without a clear transition is slightly increased, likely an ileus.  Decreased moderate ascites.    1.5 cm pancreatic versus peripancreatic soft tissue nodule    10/13/20 : Xray Chest 1 View- PORTABLE-Urgent (Xray Chest 1 View- PORTABLE-Urgent .) (10.13.20 @ 16:34) Clear lungs.          MICROBIOLOGY DATA:    Culture - Sputum . (10.26.20 @ 00:26)   - Ampicillin/Sulbactam: R <=8/4   - Cefazolin: R >16   - Ceftazidime: I 16   - Clindamycin: R >4   - Erythromycin: R >4   - Gentamicin: S <=1 Should not be used as monotherapy   - Levofloxacin: S <=0.5   - Linezolid: S 2   - Oxacillin: R >2   - Penicillin: R >8   - RIF- Rifampin: S <=1 Should not be used as monotherapy   - Tetra/Doxy: S <=1   - Trimethoprim/Sulfamethoxazole: S <=0.5/9.5   - Trimethoprim/Sulfamethoxazole: S <=0.5/9.5   - Vancomycin: S 1   Gram Stain:  Moderate polymorphonuclear leukocytes per low power field   Rare Squamous epithelial cells per low power field   Moderate Gram Positive Cocci in Clusters per oil power field   Moderate Yeast per oil power field   Specimen Source: .Sputum Sputum   Culture Results:  Moderate Methicillin resistant Staphylococcus aureus   Moderate Stenotrophomonas maltophilia   Normal Respiratory Erin present   Organism Identification: Methicillin resistant Staphylococcus aureus   Stenotrophomonas maltophilia   Organism: Methicillin resistant Staphylococcus aureus   Organism: Stenotrophomonas maltophilia       MRSA/MSSA PCR (10.21.20 @ 09:41)   MRSA PCR Result.: Detected:       Culture - Blood (10.20.20 @ 22:09)   Specimen Source: .Blood Blood   Culture Results:  No growth to date.     Culture - Blood (10.20.20 @ 22:09)   Specimen Source: .Blood Blood   Culture Results:   No growth to date.     Culture - Blood in AM (10.16.20 @ 10:13)   Specimen Source: .Blood Blood-Peripheral   Culture Results:   No growth to date.     Culture - Blood in AM (10.16.20 @ 10:13)   Specimen Source: .Blood Blood-Peripheral   Culture Results:   No growth to date.     Culture - Blood (10.13.20 @ 22:23)   Growth in anaerobic bottle: Gram Negative Rods   Specimen Source: .Blood Blood-Peripheral   Organism: Blood Culture PCR   Culture Results:   Growth in anaerobic bottle: Bacteroides fragilis   "Susceptibilities not performed"     Culture - Blood (10.13.20 @ 22:23)   Specimen Source: .Blood Blood-Peripheral   Culture Results:   No growth to date.     Urine Microscopic-Add On (NC) (10.13.20 @ 21:39)   Bacteria: Moderate /HPF   Epithelial Cells: Moderate /HPF   Red Blood Cell - Urine: 5-10 /HPF   White Blood Cell - Urine: 6-10 /HPF

## 2020-11-04 NOTE — PROGRESS NOTE ADULT - SUBJECTIVE AND OBJECTIVE BOX
Chief Complaint:  Patient is a 65y old  Female who presents with a chief complaint of Hypotension (2020 18:04)      Reason for consult: r/o Budd Chiari Interval Events: Patient was seen and examined at bedside. Remains intubated, but awake, following commands.     Hospital Medications:  albumin human 25% IVPB 50 milliLiter(s) IV Intermittent every 6 hours  chlorhexidine 0.12% Liquid 15 milliLiter(s) Oral Mucosa every 12 hours  chlorhexidine 2% Cloths 1 Application(s) Topical daily  dexMEDEtomidine Infusion 0.2 MICROgram(s)/kG/Hr IV Continuous <Continuous>  heparin   Injectable 5000 Unit(s) SubCutaneous every 12 hours  lactulose Syrup 30 Gram(s) Oral every 8 hours  levoFLOXacin IVPB 500 milliGRAM(s) IV Intermittent every 24 hours  midodrine. 10 milliGRAM(s) Oral three times a day  norepinephrine Infusion 0.05 MICROgram(s)/kG/Min IV Continuous <Continuous>  nystatin Powder 1 Application(s) Topical two times a day  pantoprazole  Injectable 40 milliGRAM(s) IV Push two times a day  polyethylene glycol 3350 17 Gram(s) Oral daily  rifAXIMin 550 milliGRAM(s) Oral two times a day  sodium chloride 0.9% lock flush 10 milliLiter(s) IV Push every 1 hour PRN      ROS:   Unable to obtain detailed ROS, but patient denies pain.       PHYSICAL EXAM:   Vital Signs:  Vital Signs Last 24 Hrs  T(C): 36.7 (2020 16:45), Max: 37.4 (2020 23:52)  T(F): 98 (2020 16:45), Max: 99.4 (2020 23:52)  HR: 98 (2020 19:00) (81 - 111)  BP: 93/60 (2020 19:00) (77/53 - 120/73)  BP(mean): 67 (2020 19:00) (57 - 85)  RR: 26 (2020 19:00) (24 - 45)  SpO2: 100% (2020 19:00) (82% - 100%)  Daily     Daily Weight in k.8 (2020 07:00)    NEURO: opening eyes to verbal stimuli, following command  CHEST: intubated, remains on vent  CARDIAC: regular rate, rhythm  ABDOMEN: soft, non-tender, non-distended, no rebound or guarding  EXTREMITIES: B/l LE edema  LABS: reviewed                        7.5    9.36  )-----------( 96       ( 2020 06:12 )             22.6     11-    136  |  104  |  29<H>  ----------------------------<  94  4.5   |  22  |  2.25<H>    Ca    8.0<L>      2020 06:12  Phos  3.1       Mg     2.1         TPro  5.4<L>  /  Alb  2.9<L>  /  TBili  6.0<H>  /  DBili  x   /  AST  165<H>  /  ALT  72<H>  /  AlkPhos  142<H>  11-    LIVER FUNCTIONS - ( 2020 06:12 )  Alb: 2.9 g/dL / Pro: 5.4 g/dL / ALK PHOS: 142 U/L / ALT: 72 U/L DA / AST: 165 U/L / GGT: x             Interval Diagnostic Studies: see sunrise for full report

## 2020-11-04 NOTE — PROGRESS NOTE ADULT - ASSESSMENT
64 yo Female with Hx of recurrent SBO`s s/p exp lap, small bowel resection in 2015, ex lap, ALBINA in 2018, DVT, PE (was on Xarelto), IVC filter and s/p supra-hepatic IVC thrombosis/occlusion, anemia, anasarca was sent to ED by PCP for hypotension, generalized weakness and chills and was admitted on 10/13/2020 for hypotension, r/o sepsis , found to have Bacteroides fragilis bacteremia, suspected GI source, was treated with cefepime + metronidazole, remained hypothermic, hypotensive, was transferred to ICU and switched to meropenem on 10/21. Repeat CT 10/21 suggested  non-specific enterocolitis, noninfectious inflammatory bowel disease or ischemic bowel along with ileus, and concern for SBO given mild luminal narrowing at distal small bowel. Patient remained hypotensive, hypothermic, requiring pressor support, septic and became encephalopathic and got intubated on 10/24, also diagnosed with HCAP vs. aspirational PNA, sputum growing MRSA and Stenotrophomonas maltophilia.  She was switched to levofloxacin on 10/27 and now on levo plus vancomycin. 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 Liver enzymes were /are up-trending, along with bilirubin, was suspected due to ongoing sepsis. Patient also noted with cardiomyopathy with EF 10-15%.     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 (as per d/w primary team, it was non-conclusive)  - No caudate lobe hypertrophy reported (present in 75% of Budd Chiari cases), no macroregenerative nodules reported, characteristic pattern of parenchymal perfusion not described,, but study reported limited due to motion artifact   - Venography would be gold standard for diagnosis, discussed earlier with IR, if patient will be more stable (possibly outpatient), can be evaluated at [facility] for Dx and potential intervention, however this time patient remains intubated , on pressor and now with EF 10-15 %  - was already on full dose anticoagulation, was being held b/o coagulopathy and now b/o severe thrombocytopenia, AC per primary team/hem  - portal vein and IVC were patent on recent imaging  - Repeat US abd was done, but was limited and no comment either on hepatic vein again or on collaterals  - anticardiolipin antibody pos 1x (prior negative 1x)     # Abnormal liver enzymes with hyperbilirubinemia and coagulopathy, now MOF  # Ascites  - Elevated liver enzymes partially due to ongoing sepsis (only mildly elevated or normal on admission and started to worsen when patient condition deteriorated, became hypotensive, hypothermic; AST 46-28-...248...-104-114-112; ALT 70-45...105-..67-61-61; ALP  113 -245-282; Se bi 0.6-1.4-...5.5-6.2-5.8), now again worsening , ALT 72, ,  se bi 6.0  - Mgmt. of sepsis per ICU  - Also noted severely reduced LVEF (10-15%) and RV dysfunction, thus hepatic congestion can also contribute (had nl EF in 5/2020)  - Dx paracentesis could be useful (last 2 sets of BCx nl) but as d/w IR, no safe window based on last CT and now small ascites on recent US abdomen  - Prior ascites analysis from 2019: ascites albumin< 0.2, protein < 1; serum albumin 1.0; SAAG< 1.1 (early Budd Chiari would usually give SAAG > 1.1, total protein > 2.5, and in late Budd Chiari SAAG> 1.1 and total protein < 2.5), SAAG < 1.1 with low protein could be due to bowel obstruction/infarction, serositis among others  - Given that still up-trending bilirubin, and coagulopathy, can consider re-sending full liver workup, including repeat acute hep panel negative), , EBV, CMV, HSV, VZV, hep E, autoimmune workup, ceruloplasmin (prior liver workup: KATHY neg, AMA neg, HBsAg neg, HBcAb IgM neg, HAV IgM neg)  - Consider checking Factor VIII    # Encephalopathy, possibly multifactorial  Mental status overall improved, following commands  - c/w lactulose and c/w rifaximin   - consider CT head (was on full dose AC) - was held b/o lack of focal signs  - on low dose precedex for tachypnea    # Anemia, thrombocytopenia   - Hematology following, appreciated  - FOBT pos, GI f/u    Will continue to follow  D/w primary team  Thank you for the consult

## 2020-11-04 NOTE — PROGRESS NOTE ADULT - ASSESSMENT
Patient is a 65y old  Female from home, lives with daughter, ambulates with walker with PMH of recurrent SBO's, s/p exp lap, SB resection in 2015, ex lap, ALBINA in 2018, DVT, PE, on Xarelto, IVC filter, chronic leg swelling, and anasarca, Now send in to the ER by her PCP, Dr. Ross, for evaluation of  generalized weakness and hypotension BP of 80/40 during office visit. On admission, she found to have no fever and BP was in lower normal range and no Leukocytosis. The CXR is clear and CT abd/pelvis consistent with Ileus. The ID consult requested to assist with evaluation of infectious etiology of episode of hypotension. Found to have Bacteroides bacteremia and now developed septic shock on Cefepime and Flagyl. Hence transferred to ICU. 10/20/20.    # Hypotension  at office- BP of 80/40 - resolved   #  Bacteroides fragilis Bacteremia - 10/13/20 - ? source most likely GI- Repeat Blood Cxs have NGTD 10/16/20  # Ileus  - h/o recurrent SBO and s/p EXp. LAp  # COVID 19 negative   # Septic shock ( Hypothermia + hypotensive)- transferred to ICU since requiring pressor- source GI, The CT abd/pelvis shows nonspecific enterocolitis, noninfectious inflammatory bowel disease, or ischemic bowel, along with ileus.   # Pneumonia- HCAP vs Aspiration - on CXR 10/24 and CT chest 10/21- sputum Cx grew Methicillin resistant Staphylococcus aureus  and Stenotrophomonas maltophilia.      would recommend:    1. Please discontinue Vancomycin since level is up and check level daily  2. Please adjust Levaquin dose to 500 mg from 750 mg based on Crcl, to cover Stenotrophomonas  3. Aspiration precaution  4. Monitor kidney function and adjust Abx doses accordingly   5. Management of Vent as per ICU protocol      d/w ICU team     Attending Attestation:    Spent more than 45 minutes on total encounter, more than 50 % of the visit was spent counseling and/or coordinating care by the Attending physician.

## 2020-11-04 NOTE — CONSULT NOTE ADULT - CONSULT REQUESTED DATE/TIME
23-Oct-2020 10:18
04-Nov-2020 15:12
13-Oct-2020 21:38
16-Oct-2020 14:50
19-Oct-2020 09:33
20-Oct-2020
20-Oct-2020 18:57
23-Oct-2020 16:15
14-Oct-2020 12:45

## 2020-11-04 NOTE — CONSULT NOTE ADULT - ASSESSMENT
65 years old female admitted for septic shock secondary to bacteroids bacteremia. Cardiology is consulted because of Possibility of component of cardiogenic shock. Patient EF on this addmisson is 15-20 % (compared to 55 % EF at baseline). Patient EKG shows no ischemic changes. Patient is off pressors on midodrine. Looks likley septic shock then cardiogenic shock. CXR shows some pulmonary congestion and edema on right side

## 2020-11-04 NOTE — CONSULT NOTE ADULT - SUBJECTIVE AND OBJECTIVE BOX
HPI:  65y Female from home, lives with daughter, ambulates with walker with PMH of recurrent SBO's, s/p exp lap, SB resection in 2015, ex lap, ALBINA in 2018, DVT, PE, on Xarelto, IVC filter, chronic leg swelling, and anasarca, came in to the ED after being sent by PCP Dr. Ross for generalized weakness and hypotension during office visit. Patient was seen by PCP for weakness and chills earlier today and was sent to the ED after she was noted to be hypotensive with BP 80/40. Patient denies fever, SOB, palpitations, changes to her bowel or urinary habits, abdominal pain, urinary symptoms or any other acute complaints.    In ED, /71, HR 86 (13 Oct 2020 17:53)    Interval History:     MEDICATIONS  (STANDING):  albumin human 25% IVPB 50 milliLiter(s) IV Intermittent every 6 hours  chlorhexidine 0.12% Liquid 15 milliLiter(s) Oral Mucosa every 12 hours  chlorhexidine 2% Cloths 1 Application(s) Topical daily  dexMEDEtomidine Infusion 0.2 MICROgram(s)/kG/Hr (2.81 mL/Hr) IV Continuous <Continuous>  heparin   Injectable 5000 Unit(s) SubCutaneous every 12 hours  lactulose Syrup 30 Gram(s) Oral every 6 hours  levoFLOXacin IVPB 750 milliGRAM(s) IV Intermittent every 24 hours  levoFLOXacin IVPB      midodrine. 10 milliGRAM(s) Oral three times a day  nystatin Powder 1 Application(s) Topical two times a day  pantoprazole  Injectable 40 milliGRAM(s) IV Push two times a day  polyethylene glycol 3350 17 Gram(s) Oral daily  rifAXIMin 550 milliGRAM(s) Oral two times a day    MEDICATIONS  (PRN):  sodium chloride 0.9% lock flush 10 milliLiter(s) IV Push every 1 hour PRN Pre/post blood products, medications, blood draw, and to maintain line patency    PAST MEDICAL & SURGICAL HISTORY:  Anasarca    Pulmonary embolism    DVT (deep venous thrombosis)    SBO (small bowel obstruction)    H/O exploratory laparotomy      SOCIAL HISTORY:  Smoker:  YES / NO        PACK YEARS:                         WHEN QUIT?  ETOH use:  YES / NO               FREQUENCY / QUANTITY:  Ilicit Drug use:  YES / NO          Vital Signs Last 24 Hrs  T(C): 37.1 (04 Nov 2020 12:00), Max: 37.4 (03 Nov 2020 23:52)  T(F): 98.7 (04 Nov 2020 12:00), Max: 99.4 (03 Nov 2020 23:52)  HR: 82 (04 Nov 2020 14:00) (82 - 111)  BP: 83/62 (04 Nov 2020 14:00) (58/43 - 114/78)  BP(mean): 67 (04 Nov 2020 14:00) (46 - 85)  RR: 25 (04 Nov 2020 14:00) (24 - 45)  SpO2: 100% (04 Nov 2020 14:00) (98% - 100%)    General: Asedated and intubated  Neck: Supple, NO JVD  Cardiac: S1 S2, No M/R/G  Pulmonary: CTAB, Breathing unlabored, No Rhonchi/Rales/Wheezing  Abdomen: Soft, Non -tender, +BS x 4 quads  Extremities: No Rashes, No edema  Neuro: No focal deficits        Telemetry:  EKG:  CHADSVASC:                          7.5    9.36  )-----------( 96       ( 04 Nov 2020 06:12 )             22.6     11-04    136  |  104  |  29<H>  ----------------------------<  94  4.5   |  22  |  2.25<H>    Ca    8.0<L>      04 Nov 2020 06:12  Phos  3.1     11-04  Mg     2.1     11-04    TPro  5.4<L>  /  Alb  2.9<L>  /  TBili  6.0<H>  /  DBili  x   /  AST  165<H>  /  ALT  72<H>  /  AlkPhos  142<H>  11-04

## 2020-11-04 NOTE — PROGRESS NOTE ADULT - SUBJECTIVE AND OBJECTIVE BOX
INTERVAL HPI/OVERNIGHT EVENTS: ***    PRESSORS: [ ] YES [ ] NO  WHICH:    ANTIBIOTICS:                  DATE STARTED:  ANTIBIOTICS:                  DATE STARTED:  ANTIBIOTICS:                  DATE STARTED:    Antimicrobial:  levoFLOXacin IVPB 750 milliGRAM(s) IV Intermittent every 24 hours  levoFLOXacin IVPB      rifAXIMin 550 milliGRAM(s) Oral two times a day    Cardiovascular:  midodrine. 10 milliGRAM(s) Oral three times a day    Pulmonary:    Hematalogic:  heparin   Injectable 5000 Unit(s) SubCutaneous every 12 hours    Other:  albumin human 25% IVPB 50 milliLiter(s) IV Intermittent every 6 hours  chlorhexidine 0.12% Liquid 15 milliLiter(s) Oral Mucosa every 12 hours  chlorhexidine 2% Cloths 1 Application(s) Topical daily  dexMEDEtomidine Infusion 0.2 MICROgram(s)/kG/Hr IV Continuous <Continuous>  lactulose Syrup 30 Gram(s) Oral every 6 hours  nystatin Powder 1 Application(s) Topical two times a day  pantoprazole  Injectable 40 milliGRAM(s) IV Push two times a day  polyethylene glycol 3350 17 Gram(s) Oral daily  sodium chloride 0.9% lock flush 10 milliLiter(s) IV Push every 1 hour PRN    albumin human 25% IVPB 50 milliLiter(s) IV Intermittent every 6 hours  chlorhexidine 0.12% Liquid 15 milliLiter(s) Oral Mucosa every 12 hours  chlorhexidine 2% Cloths 1 Application(s) Topical daily  dexMEDEtomidine Infusion 0.2 MICROgram(s)/kG/Hr IV Continuous <Continuous>  heparin   Injectable 5000 Unit(s) SubCutaneous every 12 hours  lactulose Syrup 30 Gram(s) Oral every 6 hours  levoFLOXacin IVPB 750 milliGRAM(s) IV Intermittent every 24 hours  levoFLOXacin IVPB      midodrine. 10 milliGRAM(s) Oral three times a day  nystatin Powder 1 Application(s) Topical two times a day  pantoprazole  Injectable 40 milliGRAM(s) IV Push two times a day  polyethylene glycol 3350 17 Gram(s) Oral daily  rifAXIMin 550 milliGRAM(s) Oral two times a day  sodium chloride 0.9% lock flush 10 milliLiter(s) IV Push every 1 hour PRN    Drug Dosing Weight  Height (cm): 167.6 (21 Oct 2020 02:26)  Weight (kg): 56.2 (21 Oct 2020 02:26)  BMI (kg/m2): 20 (21 Oct 2020 02:26)  BSA (m2): 1.63 (21 Oct 2020 02:26)    CENTRAL LINE: [ ] YES [ ] NO  LOCATION:         TREJO: [ ] YES [ ] NO          A-LINE:  [ ] YES [ ] NO  LOCATION:             ICU Vital Signs Last 24 Hrs  T(C): 36.9 (04 Nov 2020 07:00), Max: 37.4 (03 Nov 2020 23:52)  T(F): 98.4 (04 Nov 2020 07:00), Max: 99.4 (03 Nov 2020 23:52)  HR: 90 (04 Nov 2020 11:00) (90 - 111)  BP: 85/62 (04 Nov 2020 11:00) (58/43 - 114/78)  BP(mean): 67 (04 Nov 2020 11:00) (46 - 85)  ABP: --  ABP(mean): --  RR: 31 (04 Nov 2020 11:00) (18 - 45)  SpO2: 98% (04 Nov 2020 11:00) (96% - 100%)      ABG - ( 03 Nov 2020 08:47 )  pH, Arterial: 7.33  pH, Blood: x     /  pCO2: 41    /  pO2: 123   / HCO3: 21    / Base Excess: -3.8  /  SaO2: 98                    11-03 @ 07:01  -  11-04 @ 07:00  --------------------------------------------------------  IN: 1426.2 mL / OUT: 255 mL / NET: 1171.2 mL        Mode: AC/ CMV (Assist Control/ Continuous Mandatory Ventilation)  RR (machine): 24  TV (machine): 375  FiO2: 40  PEEP: 5  ITime: 1  MAP: 13  PIP: 24      REVIEW OF SYSTEMS:    CONSTITUTIONAL: No weakness, fevers or chills  NECK: No pain or stiffness  RESPIRATORY: No cough, wheezing, hemoptysis; No shortness of breath  CARDIOVASCULAR: No chest pain or palpitations  GASTROINTESTINAL: No abdominal or epigastric pain. No nausea, vomiting, No diarrhea or constipation. No melena or hematochezia.  GENITOURINARY: No dysuria, frequency or hematuria  NEUROLOGICAL: No numbness or weakness  All other review of systems is negative unless indicated above      PHYSICAL EXAM:    GENERAL: NAD, well-groomed, well-developed  EYES: EOMI, PERRLA,   NECK: Supple, No JVD; Normal thyroid; Trachea midline  NERVOUS SYSTEM:  Alert & Oriented X3,  Motor Strength 5/5 B/L upper and lower extremities; DTRs 2+ intact and symmetric  CHEST/LUNG: No rales, rhonchi, wheezing   HEART: Regular rate and rhythm; No murmurs,   ABDOMEN: Soft, Nontender, Nondistended; Bowel sounds present  EXTREMITIES:  2+ Peripheral Pulses, No clubbing, cyanosis, or edema        LABS:  CBC Full  -  ( 04 Nov 2020 06:12 )  WBC Count : 9.36 K/uL  RBC Count : 2.45 M/uL  Hemoglobin : 7.5 g/dL  Hematocrit : 22.6 %  Platelet Count - Automated : 96 K/uL  Mean Cell Volume : 92.2 fl  Mean Cell Hemoglobin : 30.6 pg  Mean Cell Hemoglobin Concentration : 33.2 gm/dL  Auto Neutrophil # : 7.72 K/uL  Auto Lymphocyte # : 0.76 K/uL  Auto Monocyte # : 0.73 K/uL  Auto Eosinophil # : 0.01 K/uL  Auto Basophil # : 0.04 K/uL  Auto Neutrophil % : 82.5 %  Auto Lymphocyte % : 8.1 %  Auto Monocyte % : 7.8 %  Auto Eosinophil % : 0.1 %  Auto Basophil % : 0.4 %    11-04    136  |  104  |  29<H>  ----------------------------<  94  4.5   |  22  |  2.25<H>    Ca    8.0<L>      04 Nov 2020 06:12  Phos  3.1     11-04  Mg     2.1     11-04    TPro  5.4<L>  /  Alb  2.9<L>  /  TBili  6.0<H>  /  DBili  x   /  AST  165<H>  /  ALT  72<H>  /  AlkPhos  142<H>  11-04    PT/INR - ( 04 Nov 2020 06:12 )   PT: 16.8 sec;   INR: 1.44 ratio         PTT - ( 04 Nov 2020 06:12 )  PTT:47.5 sec        RADIOLOGY & ADDITIONAL STUDIES REVIEWED:  ***    [ ]GOALS OF CARE DISCUSSION WITH PATIENT/FAMILY/PROXY:    CRITICAL CARE TIME SPENT: 35 minutes INTERVAL HPI/OVERNIGHT EVENTS:   Patient had mucus plaque on CXr in 11/3 ,   ETT advanced, suction and lavage performed and following 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 10 milliGRAM(s) Oral three times a day    Pulmonary:    Hematalogic:  heparin   Injectable 5000 Unit(s) SubCutaneous every 12 hours    Other:  albumin human 25% IVPB 50 milliLiter(s) IV Intermittent every 6 hours  chlorhexidine 0.12% Liquid 15 milliLiter(s) Oral Mucosa every 12 hours  chlorhexidine 2% Cloths 1 Application(s) Topical daily  dexMEDEtomidine Infusion 0.2 MICROgram(s)/kG/Hr IV Continuous <Continuous>  lactulose Syrup 30 Gram(s) Oral every 6 hours  nystatin Powder 1 Application(s) Topical two times a day  pantoprazole  Injectable 40 milliGRAM(s) IV Push two times a day  polyethylene glycol 3350 17 Gram(s) Oral daily  sodium chloride 0.9% lock flush 10 milliLiter(s) IV Push every 1 hour PRN    albumin human 25% IVPB 50 milliLiter(s) IV Intermittent every 6 hours  chlorhexidine 0.12% Liquid 15 milliLiter(s) Oral Mucosa every 12 hours  chlorhexidine 2% Cloths 1 Application(s) Topical daily  dexMEDEtomidine Infusion 0.2 MICROgram(s)/kG/Hr IV Continuous <Continuous>  heparin   Injectable 5000 Unit(s) SubCutaneous every 12 hours  lactulose Syrup 30 Gram(s) Oral every 6 hours  levoFLOXacin IVPB 750 milliGRAM(s) IV Intermittent every 24 hours  levoFLOXacin IVPB      midodrine. 10 milliGRAM(s) Oral three times a day  nystatin Powder 1 Application(s) Topical two times a day  pantoprazole  Injectable 40 milliGRAM(s) IV Push two times a day  polyethylene glycol 3350 17 Gram(s) Oral daily  rifAXIMin 550 milliGRAM(s) Oral two times a day  sodium chloride 0.9% lock flush 10 milliLiter(s) IV Push every 1 hour PRN    Drug Dosing Weight  Height (cm): 167.6 (21 Oct 2020 02:26)  Weight (kg): 56.2 (21 Oct 2020 02:26)  BMI (kg/m2): 20 (21 Oct 2020 02:26)  BSA (m2): 1.63 (21 Oct 2020 02:26)    CENTRAL LINE: [ ] YES [ ] NO  LOCATION:         TREJO: [ ] YES [ ] NO          A-LINE:  [ ] YES [ ] NO  LOCATION:             ICU Vital Signs Last 24 Hrs  T(C): 36.9 (04 Nov 2020 07:00), Max: 37.4 (03 Nov 2020 23:52)  T(F): 98.4 (04 Nov 2020 07:00), Max: 99.4 (03 Nov 2020 23:52)  HR: 90 (04 Nov 2020 11:00) (90 - 111)  BP: 85/62 (04 Nov 2020 11:00) (58/43 - 114/78)  BP(mean): 67 (04 Nov 2020 11:00) (46 - 85)  ABP: --  ABP(mean): --  RR: 31 (04 Nov 2020 11:00) (18 - 45)  SpO2: 98% (04 Nov 2020 11:00) (96% - 100%)      ABG - ( 03 Nov 2020 08:47 )  pH, Arterial: 7.33  pH, Blood: x     /  pCO2: 41    /  pO2: 123   / HCO3: 21    / Base Excess: -3.8  /  SaO2: 98                    11-03 @ 07:01  -  11-04 @ 07:00  --------------------------------------------------------  IN: 1426.2 mL / OUT: 255 mL / NET: 1171.2 mL        Mode: AC/ CMV (Assist Control/ Continuous Mandatory Ventilation)  RR (machine): 24  TV (machine): 375  FiO2: 40  PEEP: 5  ITime: 1  MAP: 13  PIP: 24      REVIEW OF SYSTEMS:    CONSTITUTIONAL: No weakness, fevers or chills  NECK: No pain or stiffness  RESPIRATORY: No cough, wheezing, hemoptysis; No shortness of breath  CARDIOVASCULAR: No chest pain or palpitations  GASTROINTESTINAL: No abdominal or epigastric pain. No nausea, vomiting, No diarrhea or constipation. No melena or hematochezia.  GENITOURINARY: No dysuria, frequency or hematuria  NEUROLOGICAL: No numbness or weakness  All other review of systems is negative unless indicated above      PHYSICAL EXAM:    GENERAL: NAD, well-groomed, well-developed  EYES: EOMI, PERRLA,   NECK: Supple, No JVD; Normal thyroid; Trachea midline  NERVOUS SYSTEM:  Alert & Oriented X3,  Motor Strength 5/5 B/L upper and lower extremities; DTRs 2+ intact and symmetric  CHEST/LUNG: No rales, rhonchi, wheezing   HEART: Regular rate and rhythm; No murmurs,   ABDOMEN: Soft, Nontender, Nondistended; Bowel sounds present  EXTREMITIES:  2+ Peripheral Pulses, No clubbing, cyanosis, or edema        LABS:  CBC Full  -  ( 04 Nov 2020 06:12 )  WBC Count : 9.36 K/uL  RBC Count : 2.45 M/uL  Hemoglobin : 7.5 g/dL  Hematocrit : 22.6 %  Platelet Count - Automated : 96 K/uL  Mean Cell Volume : 92.2 fl  Mean Cell Hemoglobin : 30.6 pg  Mean Cell Hemoglobin Concentration : 33.2 gm/dL  Auto Neutrophil # : 7.72 K/uL  Auto Lymphocyte # : 0.76 K/uL  Auto Monocyte # : 0.73 K/uL  Auto Eosinophil # : 0.01 K/uL  Auto Basophil # : 0.04 K/uL  Auto Neutrophil % : 82.5 %  Auto Lymphocyte % : 8.1 %  Auto Monocyte % : 7.8 %  Auto Eosinophil % : 0.1 %  Auto Basophil % : 0.4 %    11-04    136  |  104  |  29<H>  ----------------------------<  94  4.5   |  22  |  2.25<H>    Ca    8.0<L>      04 Nov 2020 06:12  Phos  3.1     11-04  Mg     2.1     11-04    TPro  5.4<L>  /  Alb  2.9<L>  /  TBili  6.0<H>  /  DBili  x   /  AST  165<H>  /  ALT  72<H>  /  AlkPhos  142<H>  11-04    PT/INR - ( 04 Nov 2020 06:12 )   PT: 16.8 sec;   INR: 1.44 ratio         PTT - ( 04 Nov 2020 06:12 )  PTT:47.5 sec        RADIOLOGY & ADDITIONAL STUDIES REVIEWED:  ***    [ ]GOALS OF CARE DISCUSSION WITH PATIENT/FAMILY/PROXY:    CRITICAL CARE TIME SPENT: 35 minutes INTERVAL HPI/OVERNIGHT EVENTS:   Patient had mucus plaque on CXr in 11/3 ,   ETT advanced, suction and lavage performed and following 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 10 milliGRAM(s) Oral three times a day    Pulmonary:    Hematalogic:  heparin   Injectable 5000 Unit(s) SubCutaneous every 12 hours    Other:  albumin human 25% IVPB 50 milliLiter(s) IV Intermittent every 6 hours  chlorhexidine 0.12% Liquid 15 milliLiter(s) Oral Mucosa every 12 hours  chlorhexidine 2% Cloths 1 Application(s) Topical daily  dexMEDEtomidine Infusion 0.2 MICROgram(s)/kG/Hr IV Continuous <Continuous>  lactulose Syrup 30 Gram(s) Oral every 6 hours  nystatin Powder 1 Application(s) Topical two times a day  pantoprazole  Injectable 40 milliGRAM(s) IV Push two times a day  polyethylene glycol 3350 17 Gram(s) Oral daily  sodium chloride 0.9% lock flush 10 milliLiter(s) IV Push every 1 hour PRN    albumin human 25% IVPB 50 milliLiter(s) IV Intermittent every 6 hours  chlorhexidine 0.12% Liquid 15 milliLiter(s) Oral Mucosa every 12 hours  chlorhexidine 2% Cloths 1 Application(s) Topical daily  dexMEDEtomidine Infusion 0.2 MICROgram(s)/kG/Hr IV Continuous <Continuous>  heparin   Injectable 5000 Unit(s) SubCutaneous every 12 hours  lactulose Syrup 30 Gram(s) Oral every 6 hours  levoFLOXacin IVPB 750 milliGRAM(s) IV Intermittent every 24 hours  levoFLOXacin IVPB      midodrine. 10 milliGRAM(s) Oral three times a day  nystatin Powder 1 Application(s) Topical two times a day  pantoprazole  Injectable 40 milliGRAM(s) IV Push two times a day  polyethylene glycol 3350 17 Gram(s) Oral daily  rifAXIMin 550 milliGRAM(s) Oral two times a day  sodium chloride 0.9% lock flush 10 milliLiter(s) IV Push every 1 hour PRN    Drug Dosing Weight  Height (cm): 167.6 (21 Oct 2020 02:26)  Weight (kg): 56.2 (21 Oct 2020 02:26)  BMI (kg/m2): 20 (21 Oct 2020 02:26)  BSA (m2): 1.63 (21 Oct 2020 02:26)    CENTRAL LINE: [ ] YES [ ] NO  LOCATION:         TREJO: [ ] YES [ ] NO          A-LINE:  [ ] YES [ ] NO  LOCATION:             ICU Vital Signs Last 24 Hrs  T(C): 36.9 (04 Nov 2020 07:00), Max: 37.4 (03 Nov 2020 23:52)  T(F): 98.4 (04 Nov 2020 07:00), Max: 99.4 (03 Nov 2020 23:52)  HR: 90 (04 Nov 2020 11:00) (90 - 111)  BP: 85/62 (04 Nov 2020 11:00) (58/43 - 114/78)  BP(mean): 67 (04 Nov 2020 11:00) (46 - 85)  ABP: --  ABP(mean): --  RR: 31 (04 Nov 2020 11:00) (18 - 45)  SpO2: 98% (04 Nov 2020 11:00) (96% - 100%)      ABG - ( 03 Nov 2020 08:47 )  pH, Arterial: 7.33  pH, Blood: x     /  pCO2: 41    /  pO2: 123   / HCO3: 21    / Base Excess: -3.8  /  SaO2: 98                    11-03 @ 07:01  -  11-04 @ 07:00  --------------------------------------------------------  IN: 1426.2 mL / OUT: 255 mL / NET: 1171.2 mL        Mode: AC/ CMV (Assist Control/ Continuous Mandatory Ventilation)  RR (machine): 24  TV (machine): 375  FiO2: 40  PEEP: 5  ITime: 1  MAP: 13  PIP: 24      PHYSICAL EXAM:    GENERAL: Intubated and sedated + ETT  HEAD:  Atraumatic, Normocephalic  EYES: b/l pupil reactive to light.   NECK: Supple, normal appearance, No JVD; Normal thyroid; Trachea midline  NERVOUS SYSTEM:  Alert & Oriented X0  CHEST/LUNG: equal b/l air entry and rhonchi on auscultation  HEART: Regular rate and rhythm; No murmurs, rubs, or gallops  ABDOMEN: Soft, Nontender, mildly distended; Bowel sounds present  EXTREMITIES:  2+ Peripheral Pulses, No clubbing, cyanosis, or edema  LYMPH: No lymphadenopathy noted  SKIN: No rashes or lesions; Good capillary refill      LABS:  CBC Full  -  ( 04 Nov 2020 06:12 )  WBC Count : 9.36 K/uL  RBC Count : 2.45 M/uL  Hemoglobin : 7.5 g/dL  Hematocrit : 22.6 %  Platelet Count - Automated : 96 K/uL  Mean Cell Volume : 92.2 fl  Mean Cell Hemoglobin : 30.6 pg  Mean Cell Hemoglobin Concentration : 33.2 gm/dL  Auto Neutrophil # : 7.72 K/uL  Auto Lymphocyte # : 0.76 K/uL  Auto Monocyte # : 0.73 K/uL  Auto Eosinophil # : 0.01 K/uL  Auto Basophil # : 0.04 K/uL  Auto Neutrophil % : 82.5 %  Auto Lymphocyte % : 8.1 %  Auto Monocyte % : 7.8 %  Auto Eosinophil % : 0.1 %  Auto Basophil % : 0.4 %    11-04    136  |  104  |  29<H>  ----------------------------<  94  4.5   |  22  |  2.25<H>    Ca    8.0<L>      04 Nov 2020 06:12  Phos  3.1     11-04  Mg     2.1     11-04    TPro  5.4<L>  /  Alb  2.9<L>  /  TBili  6.0<H>  /  DBili  x   /  AST  165<H>  /  ALT  72<H>  /  AlkPhos  142<H>  11-04    PT/INR - ( 04 Nov 2020 06:12 )   PT: 16.8 sec;   INR: 1.44 ratio         PTT - ( 04 Nov 2020 06:12 )  PTT:47.5 sec        RADIOLOGY & ADDITIONAL STUDIES REVIEWED:  ***    [ ]GOALS OF CARE DISCUSSION WITH PATIENT/FAMILY/PROXY:    CRITICAL CARE TIME SPENT: 35 minutes

## 2020-11-04 NOTE — CONSULT NOTE ADULT - PROVIDER SPECIALTY LIST ADULT
Gastroenterology
CCU
Infectious Disease
Hepatology
Neurology
Cardiology
Podiatry
Heme/Onc
Palliative Care

## 2020-11-04 NOTE — PROGRESS NOTE ADULT - SUBJECTIVE AND OBJECTIVE BOX
Patient is seen and examined at the bed side, remains afebrile and OFF pressor.  The creatinine is trending up. The CXR has been reviewed.       REVIEW OF SYSTEMS: Unable to obtain due to mental status         ALLERGIES: No Known Allergies        ICU Vital Signs Last 24 Hrs  T(C): 37.1 (04 Nov 2020 12:00), Max: 37.4 (03 Nov 2020 23:52)  T(F): 98.7 (04 Nov 2020 12:00), Max: 99.4 (03 Nov 2020 23:52)  HR: 88 (04 Nov 2020 16:45) (81 - 111)  BP: 86/62 (04 Nov 2020 16:45) (77/53 - 114/78)  BP(mean): 67 (04 Nov 2020 16:45) (57 - 85)  ABP: --  ABP(mean): --  RR: 40 (04 Nov 2020 16:45) (24 - 45)  SpO2: 96% (04 Nov 2020 16:45) (96% - 100%)        PHYSICAL EXAM:  GENERAL: Intubated /vented,   CHEST/LUNG: Not using accessory muscles   HEART: s1 and s2 present  ABDOMEN:  Nontender and  Nondistended  EXTREMITIES: B/L UE edematous  CNS: Intubated/vented, awake          LABS:                                   7.5    9.36  )-----------( 96       ( 04 Nov 2020 06:12 )             22.6                7.9    9.13  )-----------( 75       ( 03 Nov 2020 04:11 )             23.6         11-04    136  |  104  |  29<H>  ----------------------------<  94  4.5   |  22  |  2.25<H>    Ca    8.0<L>      04 Nov 2020 06:12  Phos  3.1     11-04  Mg     2.1     11-04    TPro  5.4<L>  /  Alb  2.9<L>  /  TBili  6.0<H>  /  DBili  x   /  AST  165<H>  /  ALT  72<H>  /  AlkPhos  142<H>  11-04 11-03    140  |  109<H>  |  25<H>  ----------------------------<  99  3.9   |  22  |  2.06<H>    Ca    7.5<L>      03 Nov 2020 04:11  Phos  2.7     11-03  Mg     1.8     11-03    TPro  4.9<L>  /  Alb  2.4<L>  /  TBili  5.0<H>  /  DBili  x   /  AST  108<H>  /  ALT  58  /  AlkPhos  204<H>  11-03        Vancomycin Level, Trough (11.04.20 @ 06:12)   Vancomycin Level, Trough: 31.0:     Vancomycin Level, Trough (11.03.20 @ 04:11)   Vancomycin Level, Trough: 16.1:    Vancomycin Level, Trough (11.01.20 @ 04:59)   Vancomycin Level, Trough: 20.9:    Vancomycin Level, Trough (10.31.20 @ 06:54)   Vancomycin Level, Trough: 20.5:    Vancomycin Level, Trough (10.30.20 @ 06:30)   Vancomycin Level, Trough: 28.1:    Vancomycin Level, Trough (10.29.20 @ 05:11)   Vancomycin Level, Trough: 36.3    Procalcitonin, Serum (10.15.20 @ 11:48)   Procalcitonin, Serum: 0.16: Procalcitonin (PCT) Interpretation (ng/mL) - Diagnosis of systemic   bacterial infection/sepsis         MEDICATIONS  (STANDING):  albumin human 25% IVPB 50 milliLiter(s) IV Intermittent every 6 hours  chlorhexidine 0.12% Liquid 15 milliLiter(s) Oral Mucosa every 12 hours  chlorhexidine 2% Cloths 1 Application(s) Topical daily  dexMEDEtomidine Infusion 0.2 MICROgram(s)/kG/Hr (2.81 mL/Hr) IV Continuous <Continuous>  heparin   Injectable 5000 Unit(s) SubCutaneous every 12 hours  lactulose Syrup 30 Gram(s) Oral every 6 hours  levoFLOXacin IVPB 500 milliGRAM(s) IV Intermittent every 24 hours  midodrine. 10 milliGRAM(s) Oral three times a day  nystatin Powder 1 Application(s) Topical two times a day  pantoprazole  Injectable 40 milliGRAM(s) IV Push two times a day  polyethylene glycol 3350 17 Gram(s) Oral daily  rifAXIMin 550 milliGRAM(s) Oral two times a day        RADIOLOGY & ADDITIONAL TESTS:    11/4/20 : Xray Chest 1 View- PORTABLE-Routine (Xray Chest 1 View- PORTABLE-Routine in AM.) (11.04.20 @ 10:59) Reexpansion of the left lung with residual left pleural effusion and/or infiltrate.  Right pulmonary edema unchanged.  Tubes and catheters in satisfactory position.      10/25/20: Xray Chest 1 View- PORTABLE-Routine (Xray Chest 1 View- PORTABLE-Routine in AM.) (10.25.20 @ 09:21) Frontal expiratory view of the chest shows the heart to be similar in size. Endotracheal tube, right jugular line and feeding tube remain present.    The lungs show similar left upper lobe infiltrate with progression of right perihilar infiltrate. Pleural effusions are similar. There is no evidence of pneumothorax.      10/23/20 : Xray Chest 1 View-PORTABLE IMMEDIATE (Xray Chest 1 View-PORTABLE IMMEDIATE .) (10.23.20 @ 19:09) Feeding tube tip near GE junction as noted. Questionable right upper lobe infiltrate. Follow-up study is recommended as clinically warranted.      10/21/20 : CT Abdomen and Pelvis w/ Oral Cont and w/ IV Cont (10.21.20 @ 15:59) Mural thickening of the left colon and rectum. Liquid stool in the colon. Apparent segmental mural thickening of the mid to distal small bowel and the proximal duodenum. Findings may represent nonspecific enterocolitis, noninfectious inflammatory bowel disease, or ischemic bowel. Clinical correlation is recommended. The celiac axis artery, SMA, and KINA are patent without stenosis.    Dilatation of the mid small bowel is again noted. Oral contrast has reached the terminal ileum. Findings may represent small bowel obstruction, or ileus related to nonspecific enterocolitis.   Cholelithiasis.    Moderate to large ascites in the abdomen, increased since the previous examination. 5 mm nonspecific noncalcified left upper lobe lung nodule; if the patient's is in the high risk category (i.e. smoker), follow-up chest CT may be pursued in 12 months to ensure stability.    Combination of atelectasis and consolidation in the left lower lobe.    Possible 1.0 cm hypodense lesion in the left lobe of the thyroid. Thyroid ultrasound may be pursued for further evaluation.   Mild bilateral pleural effusions.    Aging determinate compression fracture at T5 vertebra.        10/13/20 : CT Abdomen and Pelvis w/ IV Cont (10.13.20 @ 18:50) Diffuse dilatation of small bowel loops without a clear transition is slightly increased, likely an ileus.  Decreased moderate ascites.    1.5 cm pancreatic versus peripancreatic soft tissue nodule    10/13/20 : Xray Chest 1 View- PORTABLE-Urgent (Xray Chest 1 View- PORTABLE-Urgent .) (10.13.20 @ 16:34) Clear lungs.          MICROBIOLOGY DATA:    Culture - Sputum . (10.26.20 @ 00:26)   - Ampicillin/Sulbactam: R <=8/4   - Cefazolin: R >16   - Ceftazidime: I 16   - Clindamycin: R >4   - Erythromycin: R >4   - Gentamicin: S <=1 Should not be used as monotherapy   - Levofloxacin: S <=0.5   - Linezolid: S 2   - Oxacillin: R >2   - Penicillin: R >8   - RIF- Rifampin: S <=1 Should not be used as monotherapy   - Tetra/Doxy: S <=1   - Trimethoprim/Sulfamethoxazole: S <=0.5/9.5   - Trimethoprim/Sulfamethoxazole: S <=0.5/9.5   - Vancomycin: S 1   Gram Stain:  Moderate polymorphonuclear leukocytes per low power field   Rare Squamous epithelial cells per low power field   Moderate Gram Positive Cocci in Clusters per oil power field   Moderate Yeast per oil power field   Specimen Source: .Sputum Sputum   Culture Results:  Moderate Methicillin resistant Staphylococcus aureus   Moderate Stenotrophomonas maltophilia   Normal Respiratory Erin present   Organism Identification: Methicillin resistant Staphylococcus aureus   Stenotrophomonas maltophilia   Organism: Methicillin resistant Staphylococcus aureus   Organism: Stenotrophomonas maltophilia       MRSA/MSSA PCR (10.21.20 @ 09:41)   MRSA PCR Result.: Detected:       Culture - Blood (10.20.20 @ 22:09)   Specimen Source: .Blood Blood   Culture Results:  No growth to date.     Culture - Blood (10.20.20 @ 22:09)   Specimen Source: .Blood Blood   Culture Results:   No growth to date.     Culture - Blood in AM (10.16.20 @ 10:13)   Specimen Source: .Blood Blood-Peripheral   Culture Results:   No growth to date.     Culture - Blood in AM (10.16.20 @ 10:13)   Specimen Source: .Blood Blood-Peripheral   Culture Results:   No growth to date.     Culture - Blood (10.13.20 @ 22:23)   Growth in anaerobic bottle: Gram Negative Rods   Specimen Source: .Blood Blood-Peripheral   Organism: Blood Culture PCR   Culture Results:   Growth in anaerobic bottle: Bacteroides fragilis   "Susceptibilities not performed"     Culture - Blood (10.13.20 @ 22:23)   Specimen Source: .Blood Blood-Peripheral   Culture Results:   No growth to date.     Urine Microscopic-Add On (NC) (10.13.20 @ 21:39)   Bacteria: Moderate /HPF   Epithelial Cells: Moderate /HPF   Red Blood Cell - Urine: 5-10 /HPF   White Blood Cell - Urine: 6-10 /HPF

## 2020-11-04 NOTE — CONSULT NOTE ADULT - PROBLEM SELECTOR RECOMMENDATION 9
on antibiotics for bacteroides bacteremia, likely gi source. On pressor support. CTAP showed enterocolitis vs ischemic bowel, SBO vs ileus. 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 Worsening lfts, coags, thrombocytopenia, ?2/2 sepsis. Guarded prognosis.
Cardiology is consulted because of Possibility of component of cardiogenic shock. Patient EF on this addmisson is 15-20 % (compared to 55 % EF at baseline).   Patient EKG shows no ischemic changes.   Patient is off pressors on midodrine.   Looks likley septic shock then cardiogenic shock.   CXR shows some pulmonary congestion and edema on right side.  Patient need ischemic workup once stable for drop in EF
bacteroides  most likely GI origin  on antibiotics  no fever

## 2020-11-04 NOTE — CHART NOTE - NSCHARTNOTEFT_GEN_A_CORE
PC DICK contacted patient's daughter Jus Bucio [phone] to assess needs and schedule a family meeting for tomorrow at 3 p.m.  Patient's daughter stated she will be at the hospital in person for the meeting.  Ms. Bucio denied needs at this time.  PC  will remain available for support and collaboration with the primary team.

## 2020-11-04 NOTE — CONSULT NOTE ADULT - REASON FOR ADMISSION
Hypotension

## 2020-11-04 NOTE — PROGRESS NOTE ADULT - SUBJECTIVE AND OBJECTIVE BOX
Patient was seen and examined  Patient is a 65y old  Female who presents with a chief complaint of Hypotension (03 Nov 2020 16:51)      INTERVAL HPI/OVERNIGHT EVENTS:  T(C): 36.9 (11-04-20 @ 07:00), Max: 37.4 (11-03-20 @ 23:52)  HR: 95 (11-04-20 @ 09:30) (90 - 111)  BP: 99/65 (11-04-20 @ 09:30) (58/43 - 114/78)  RR: 45 (11-04-20 @ 09:30) (18 - 45)  SpO2: 100% (11-04-20 @ 09:30) (96% - 100%)  Wt(kg): --  I&O's Summary    03 Nov 2020 07:01  -  04 Nov 2020 07:00  --------------------------------------------------------  IN: 1426.2 mL / OUT: 255 mL / NET: 1171.2 mL    04 Nov 2020 07:01  -  04 Nov 2020 09:34  --------------------------------------------------------  IN: 2.8 mL / OUT: 85 mL / NET: -82.2 mL        LABS:                        7.5    9.36  )-----------( 96       ( 04 Nov 2020 06:12 )             22.6     11-04    136  |  104  |  29<H>  ----------------------------<  94  4.5   |  22  |  2.25<H>    Ca    8.0<L>      04 Nov 2020 06:12  Phos  3.1     11-04  Mg     2.1     11-04    TPro  5.4<L>  /  Alb  2.9<L>  /  TBili  6.0<H>  /  DBili  x   /  AST  165<H>  /  ALT  72<H>  /  AlkPhos  142<H>  11-04    PT/INR - ( 04 Nov 2020 06:12 )   PT: 16.8 sec;   INR: 1.44 ratio         PTT - ( 04 Nov 2020 06:12 )  PTT:47.5 sec    CAPILLARY BLOOD GLUCOSE      POCT Blood Glucose.: 96 mg/dL (04 Nov 2020 05:01)  POCT Blood Glucose.: 76 mg/dL (03 Nov 2020 17:30)  POCT Blood Glucose.: 79 mg/dL (03 Nov 2020 12:20)        ABG - ( 03 Nov 2020 08:47 )  pH, Arterial: 7.33  pH, Blood: x     /  pCO2: 41    /  pO2: 123   / HCO3: 21    / Base Excess: -3.8  /  SaO2: 98                    MEDICATIONS  (STANDING):  albumin human 25% IVPB 50 milliLiter(s) IV Intermittent every 6 hours  chlorhexidine 0.12% Liquid 15 milliLiter(s) Oral Mucosa every 12 hours  chlorhexidine 2% Cloths 1 Application(s) Topical daily  dexMEDEtomidine Infusion 0.2 MICROgram(s)/kG/Hr (2.81 mL/Hr) IV Continuous <Continuous>  heparin   Injectable 5000 Unit(s) SubCutaneous every 12 hours  lactulose Syrup 30 Gram(s) Oral every 6 hours  levoFLOXacin IVPB 750 milliGRAM(s) IV Intermittent every 24 hours  levoFLOXacin IVPB      midodrine. 10 milliGRAM(s) Oral three times a day  norepinephrine Infusion 0.05 MICROgram(s)/kG/Min (2.63 mL/Hr) IV Continuous <Continuous>  nystatin Powder 1 Application(s) Topical two times a day  pantoprazole  Injectable 40 milliGRAM(s) IV Push two times a day  polyethylene glycol 3350 17 Gram(s) Oral daily  rifAXIMin 550 milliGRAM(s) Oral two times a day    MEDICATIONS  (PRN):  sodium chloride 0.9% lock flush 10 milliLiter(s) IV Push every 1 hour PRN Pre/post blood products, medications, blood draw, and to maintain line patency      RADIOLOGY & ADDITIONAL TESTS:    Imaging Personally Reviewed:  [ ] YES  [ ] NO    REVIEW OF SYSTEMS:  on vent       Consultant(s) Notes Reviewed:  [ ] YES  [ ] NO    PHYSICAL EXAM:  GENERAL: NAD, well-groomed, well-developed  HEAD:  Atraumatic, Normocephalic  EYES: EOMI, PERRLA, conjunctiva and sclera clear  ENMT: No tonsillar erythema, exudates, or enlargement; Moist mucous membranes, Good dentition, No lesions  NECK: Supple, No JVD, Normal thyroid  NERVOUS SYSTEM:  on vent  CHEST/LUNG: bl rhonchi   HEART: Regular rate and rhythm; No murmurs, rubs, or gallops  ABDOMEN: Soft, Nontender, Nondistended; Bowel sounds present  EXTREMITIES:  2+ Peripheral Pulses, No clubbing, cyanosis, or edema  LYMPH: No lymphadenopathy noted  SKIN: No rashes or lesions    Care Discussed with Consultants/Other Providers [ x] YES  [ ] NO

## 2020-11-04 NOTE — PROGRESS NOTE ADULT - SUBJECTIVE AND OBJECTIVE BOX
more awake  follow simple commend  still on vent.  off pressor BP 85/62,    Pt is seen and examined  pt is awake and lying in bed/out of bed to chair  pt seems comfortable and denies any complaints at this time    ROS:  Negative except for:    MEDICATIONS  (STANDING):  albumin human 25% IVPB 50 milliLiter(s) IV Intermittent every 6 hours  chlorhexidine 0.12% Liquid 15 milliLiter(s) Oral Mucosa every 12 hours  chlorhexidine 2% Cloths 1 Application(s) Topical daily  dexMEDEtomidine Infusion 0.2 MICROgram(s)/kG/Hr (2.81 mL/Hr) IV Continuous <Continuous>  heparin   Injectable 5000 Unit(s) SubCutaneous every 12 hours  lactulose Syrup 30 Gram(s) Oral every 6 hours  levoFLOXacin IVPB 750 milliGRAM(s) IV Intermittent every 24 hours  levoFLOXacin IVPB      midodrine. 10 milliGRAM(s) Oral three times a day  nystatin Powder 1 Application(s) Topical two times a day  pantoprazole  Injectable 40 milliGRAM(s) IV Push two times a day  polyethylene glycol 3350 17 Gram(s) Oral daily  rifAXIMin 550 milliGRAM(s) Oral two times a day    MEDICATIONS  (PRN):  sodium chloride 0.9% lock flush 10 milliLiter(s) IV Push every 1 hour PRN Pre/post blood products, medications, blood draw, and to maintain line patency      Allergies    No Known Allergies    Intolerances        Vital Signs Last 24 Hrs  T(C): 37.1 (04 Nov 2020 12:00), Max: 37.4 (03 Nov 2020 23:52)  T(F): 98.7 (04 Nov 2020 12:00), Max: 99.4 (03 Nov 2020 23:52)  HR: 87 (04 Nov 2020 12:07) (87 - 111)  BP: 92/61 (04 Nov 2020 12:00) (58/43 - 114/78)  BP(mean): 68 (04 Nov 2020 12:00) (46 - 85)  RR: 30 (04 Nov 2020 12:00) (18 - 45)  SpO2: 99% (04 Nov 2020 12:07) (96% - 100%)    PHYSICAL EXAM  General: adult in NAD  HEENT: clear oropharynx, anicteric sclera, pink conjunctiva  Neck: supple  CV: normal S1/S2 with no murmur rubs or gallops  Lungs: positive air movement b/l ant lungs,clear to auscultation, no wheezes, no rales  Abdomen: soft non-tender non-distended, no hepatosplenomegaly  Ext: no clubbing cyanosis or edema  Skin: no rashes and no petechiae  Neuro: alert and oriented X 4, no focal deficits  LABS:                          7.5    9.36  )-----------( 96       ( 04 Nov 2020 06:12 )             22.6         Mean Cell Volume : 92.2 fl  Mean Cell Hemoglobin : 30.6 pg  Mean Cell Hemoglobin Concentration : 33.2 gm/dL  Auto Neutrophil # : 7.72 K/uL  Auto Lymphocyte # : 0.76 K/uL  Auto Monocyte # : 0.73 K/uL  Auto Eosinophil # : 0.01 K/uL  Auto Basophil # : 0.04 K/uL  Auto Neutrophil % : 82.5 %  Auto Lymphocyte % : 8.1 %  Auto Monocyte % : 7.8 %  Auto Eosinophil % : 0.1 %  Auto Basophil % : 0.4 %    Serial CBC  Hematocrit 22.6  Hemoglobin 7.5  Plat 96  RBC 2.45  WBC 9.36  Serial CBC  Hematocrit 23.6  Hemoglobin 7.9  Plat 75  RBC 2.53  WBC 9.13  Serial CBC  Hematocrit 24.8  Hemoglobin 8.2  Plat 69  RBC 2.67  WBC 10.08  Serial CBC  Hematocrit 24.5  Hemoglobin 8.2  Plat 60  RBC 2.63  WBC 7.99  Serial CBC  Hematocrit 20.7  Hemoglobin 6.8  Plat 56  RBC 2.12  WBC 8.41  Serial CBC  Hematocrit 21.0  Hemoglobin 6.8  Plat 56  RBC 2.14  WBC 7.60    11-04    136  |  104  |  29<H>  ----------------------------<  94  4.5   |  22  |  2.25<H>    Ca    8.0<L>      04 Nov 2020 06:12  Phos  3.1     11-04  Mg     2.1     11-04    TPro  5.4<L>  /  Alb  2.9<L>  /  TBili  6.0<H>  /  DBili  x   /  AST  165<H>  /  ALT  72<H>  /  AlkPhos  142<H>  11-04      PT/INR - ( 04 Nov 2020 06:12 )   PT: 16.8 sec;   INR: 1.44 ratio         PTT - ( 04 Nov 2020 06:12 )  PTT:47.5 sec              BLOOD SMEAR INTERPRETATION:       RADIOLOGY & ADDITIONAL STUDIES:

## 2020-11-04 NOTE — PROGRESS NOTE ADULT - ASSESSMENT
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  BC grew bacteroides.    Problem/Recommendation - 1:  Problem: Bacteremia. Recommendation: bacteroides  most likely GI origin  on antibiotics  he has hypothermia and hypotension and elevated lactic acid  in ICU now  off  pressor now  check cortisol level to r/o adrenal insuff  multiorgan failure but improving  christel is trending down  Problem/Recommendation - 2:  ·  Problem: Anemia.  Recommendation: ferritin, b12 folate normal  no hemolysis  most likley due to malnutrition from short bowel syndrome.she also had GIB multiple times before. Problem/Recommendation - 3:·  Problem: Acute deep vein thrombosis (DVT) of other vein of upper extremity.  Recommendation: she has been on xarelto for 2 years.  DVT at left arm and left leg before.  in all CT scan i did not see report of IVC or hepatic vein thrombosis  off xarelto and lovenox due to elevated PT/PTT4. elevated PT/PTT due to malnutrition and antibiotics causing VITK defnow it is mostly recovered  it begins to rise again, correctable by mixing study  probably due to liver failure this time  she develops DVT very quickly while off AC  need DVT prophylaxis, sq heparin  5. thrombocytopenia  new onset, most likely from sepsis or medication, procal is rising  the other possibilties causing multiorgan failure, including thrombocytopenia,  such as autoimmune, vasculitis, HLH, catastrophic APS(less likely)  platelet is trending up, 90 today  6. CHF, new onset global hypokinesia,   cardiomyopathy, ?etiology  will give thiamin  7. infiltrates, CHF vs infection  check COVID again  infiltrates seems better now

## 2020-11-04 NOTE — PROGRESS NOTE ADULT - ASSESSMENT
65y Female from home, lives with daughter, ambulates with walker with PMH of recurrent SBO's, s/p exp lap, SB resection in 2015, ex lap, ALBINA in 2018, DVT, PE, on Xarelto, IVC filter, chronic leg swelling, and anasarca, came in to the ED due to hypotension during office visit. Patient was found to be bacteremic with bacteroids fragilis. Admitted in ICU due to hypotension and hypothermia. On vancomycin and levo . BCx X2 negative. Sputum positive for MRSA and Stenotrophomonas.     Diagnosis:  >Encephalopathy  >Sepsis  >Bacteremia  >Anemia  >DVT/PE  >Transaminitis  >Aspiration pneumonia    --------------------------------------------Neuro--------------------------------------  -She is AAOx2 at baseline on evaluation  -Encephalopathic and got intubated on 10/24  -Ammonia trending down from 152 > 130 > 116 >103>131>126>90>77  -On lactulose 30g q 6h and rifaximin  -hypothermia has been resolved now  -INR>1.63>1.71>1.91>1.98  -Pt with multiorgan failure secondary to septic shock  -Hold CT head for now as AMS was most likely due to hepatic encephalopathy and pt didn't have any focal neurological deficit when she started having AMS  -her pupils are reactive to light b/l, patient can follow minimal commands       -------------------------------------CVS-------------------------------  -Patient is hypotensive secondary septic shock  -On abx Levo, vanc stopped previousely  for high vanc trough, recent vanc trough 21.2 m, will start on vancomycin 500mg qd for now with monitoring vanc trough   -NG tube feeding  -Midodrine 10 mg TID  -RIJ for pressors on levophed went down from 0.07 to 0.05  -Hold Lasix home med in the setting of hypotension  -Pt is getting lasix and albumin on and off   -previous ECHO 3-4 months back showed normal EF but new ECHO showed EF 15-20% with severe left ventricular dysfunction  -pt with multiorgan dysfunction sec to septic shock , mixed venous gas from [facility] showed normal O2 saturation , less likely from the low EF   -f/u on autoimmune workup KATHY, anticardiolipin antibodies IgG IgM , ANCA  -Anticardiolipin abs +ve  - will diuresis daily with Lasix and albumin     --------------------------------Respiratory----------------------  -Intubated and sedated  -CT showed mild b/l pleural effusion and left lower lobe consolidation in ICU on day 2  -aspiration pneumonia  - sputum culture grew MRSA and Stenotrophomonas (sensitive to levo)  -On levo and 500mg vancomycin   -f/u repeat sputum culture  -ID Dr. Patricio    ----------------------------------------Gastrointestinal--------------------------------------  -Patient was bacteremic with bacteroids fragilis, likely secondary from intra abdominal source.   -Patient had multiple SBO in past. 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 Likely due to sepsis vs hepatic failure sec to budd Chiari  -BCx X 2 negative.  -ammonia trends down from 150 to 130 >116>103>131>126>90> 77, on lactulose and rifaximin   -monitor BM  -GI, DR Chatterjee deferred doing colonoscopy due to multiple comorbidities, FOBT positive on 10/15  -Dr Csak recs has been noted,    -----------------------------------------ID---------------------------------------  -Patient was bacteremic with bacteroids likely GI source.  -Lactate is normal 1.3 but trends up to 2.3 >3.7  -Procalcitonin 0.79  -On levo and held vanc due to high trough , will trend every day until it gets below 20  - BP on the lower side, pt is on Levophed baby dose and midodrine 10mg TID  -Monitor vitals Q4      ---------------------------------------------Nephro-------------------------------------  -Kidney function is normal, Cl is 120 with  sodium trends down today 147>150.152>152>151>148>147>142>146> 139  -free water deficit of 500ml  -starts on 120 ml q6h  -TF  -urine lytes were sent this morning Fena was calculated 3.1,   -Monitor UO, decreased UO with anasarca edema     ----------------------------------------Hem Onc----------------------------------  -Has h/o DVt and PE in past. Patient is on Xarelto at home  -Patient has anemia  -anemia of chronic disease  -Hgb 6.8>1PRBC>8.2  -Plat trends down but trends up today to 60K , no active bleeding  - will start on prophylactic dose Ac Heparin 5000 q12 hrs per dr rivera recommendation   -Folate and B12 normal.   -Dr. Rivera recs has been noted, pt started on thiamine for 3 doses.   - will check INR daily     ----------------------------------------Endo--------------------------------  -HgbA1c 4  -Fs q6h    ----------------------------------------Prophylaxis----------------------------  DVT: heparin sq 5000 q12 hrs  GI: PPI    ---------------------------------------GOC---------------------------  -DNR   -Pt with multi organ failure secondary to septic shock with poor prognosis  -Palliative is on board       65y Female from home, lives with daughter, ambulates with walker with PMH of recurrent SBO's, s/p exp lap, SB resection in 2015, ex lap, ALBINA in 2018, DVT, PE, on Xarelto, IVC filter, chronic leg swelling, and anasarca, came in to the ED due to hypotension during office visit. Patient was found to be bacteremic with bacteroids fragilis. Admitted in ICU due to hypotension and hypothermia. On vancomycin and levo . BCx X2 negative. Sputum positive for MRSA and Stenotrophomonas.     Diagnosis:  >Encephalopathy  >Sepsis  >Bacteremia  >Anemia  >DVT/PE  >Transaminitis  >Aspiration pneumonia    --------------------------------------------Neuro--------------------------------------  -She is AAOx2 at baseline on evaluation  -Encephalopathic and got intubated on 10/24  -Ammonia trending down from 152 > 130 > 116 >103>131>126>90>77  -On lactulose 30g q 6h and rifaximin  -INR>1.63>1.71>1.91>1.98> 1.44  -Pt with multiorgan failure secondary to septic shock  -Hold CT head for now as AMS was most likely due to hepatic encephalopathy and pt didn't have any focal neurological deficit when she started having AMS  -her pupils are reactive to light b/l, patient can follow commands   - started on small dose of Precedex for tachypnea      -------------------------------------CVS-------------------------------  -Patient is hypotensive secondary septic shock  - patients started on levophed   -On abx Levo renally dosed , Vancomycin stopped for high vanc trough ,  -NG tube feeding  -Midodrine 10 mg TID  -RIJ for pressors  -Lasix PRN for improving UO  -Pt is getting lasix and albumin on and off   -previous ECHO 3-4 months back showed normal EF but new ECHO showed EF 15-20% with severe left ventricular dysfunction  -pt with multiorgan dysfunction sec to septic shock , mixed venous gas from [facility] showed borderline low O2 saturation , mixed picture of septic /cardiogenic shock   -f/u on autoimmune workup KATHY, anticardiolipin antibodies IgG IgM , ANCA  -Anticardiolipin abs +ve      --------------------------------Respiratory----------------------  -Intubated and sedated on precedex  - s/p lavage and sucction for left sided mucus plaque , with following re expansion   -CT showed mild b/l pleural effusion and left lower lobe consolidation in ICU on day 2  -aspiration pneumonia  - sputum culture grew MRSA and Stenotrophomonas (sensitive to levo)  - will c/w levaquin 500 q24 hours , renal dose and will stop vancomycin given toxic level  -f/u repeat sputum culture  -ID Dr. Patricio    ----------------------------------------Gastrointestinal--------------------------------------  -Patient was bacteremic with bacteroids fragilis, likely secondary from intra abdominal source.   -Patient had multiple SBO in past. 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 Likely due to sepsis vs hepatic failure sec to budd Chiari  -BCx X 2 negative.  -ammonia trends down from 150 to 130 >116>103>131>126>90> 77, on lactulose and rifaximin   -monitor BM, titrate lactulose top 2-3 BM qd   -GI, DR Chatterjee deferred doing colonoscopy due to multiple comorbidities, FOBT positive on 10/15  -Dr Katlyn aguilar has been noted,    -----------------------------------------ID---------------------------------------  -Patient was bacteremic with bacteroids likely GI source.  -Lactate is normal 1.3 but trends up to 2.3 >3.7  -Procalcitonin 0.79  -On levoquin and held vanc due to high trough ,   - BP on the lower side, pt is on Levophed baby dose and midodrine 10mg TID  -Monitor vitals Q4      ---------------------------------------------Nephro-------------------------------------  - hypernatremia improved   - on free water 120 ml q6h  -TF  - Creatinine trending yup , will avoid nephrotoxin , patient in MOF   -Monitor UO, decreased UO with anasarca edema     ----------------------------------------Hem Onc----------------------------------  -Has h/o DVt and PE in past. Patient is on Xarelto at home  -Patient has anemia  -anemia of chronic disease  -Hgb 6.8>1PRBC>8.2> 7.9>7.5  -plt trended up to 96, no active bleeding  - will start on prophylactic dose Ac Heparin 5000 q12 hrs per dr sullivan recommendation   -Folate and B12 normal.   -Dr. Miguel aguilar has been noted, pt started on thiamine for 3 doses.   - will check INR daily     ----------------------------------------Endo--------------------------------  -HgbA1c 4  -Fs q6h    ----------------------------------------Prophylaxis----------------------------  DVT: heparin sq 5000 q12 hrs  GI: PPI    ---------------------------------------GOC---------------------------  -DNR   -Pt with multi organ failure secondary to septic shock with poor prognosis  -Palliative is on board, family meeting for 3PM tomorrow

## 2020-11-05 NOTE — PROGRESS NOTE ADULT - SUBJECTIVE AND OBJECTIVE BOX
Patient was seen and examined  Patient is a 65y old  Female who presents with a chief complaint of Hypotension (05 Nov 2020 07:34)      INTERVAL HPI/OVERNIGHT EVENTS:  T(C): 37.3 (11-05-20 @ 05:00), Max: 37.8 (11-05-20 @ 00:05)  HR: 85 (11-05-20 @ 07:30) (75 - 107)  BP: 113/78 (11-05-20 @ 07:30) (75/51 - 122/85)  RR: 28 (11-05-20 @ 07:30) (17 - 45)  SpO2: 100% (11-05-20 @ 07:30) (82% - 100%)  Wt(kg): --  I&O's Summary    04 Nov 2020 07:01  -  05 Nov 2020 07:00  --------------------------------------------------------  IN: 677.6 mL / OUT: 380 mL / NET: 297.6 mL        LABS:                        7.4    9.84  )-----------( 130      ( 05 Nov 2020 03:13 )             21.9     11-05    137  |  104  |  36<H>  ----------------------------<  73  4.5   |  24  |  2.54<H>    Ca    8.2<L>      05 Nov 2020 03:13  Phos  3.9     11-05  Mg     2.1     11-05    TPro  5.5<L>  /  Alb  3.2<L>  /  TBili  8.6<H>  /  DBili  x   /  AST  228<H>  /  ALT  88<H>  /  AlkPhos  108  11-05    PT/INR - ( 04 Nov 2020 06:12 )   PT: 16.8 sec;   INR: 1.44 ratio         PTT - ( 04 Nov 2020 06:12 )  PTT:47.5 sec    CAPILLARY BLOOD GLUCOSE      POCT Blood Glucose.: 69 mg/dL (05 Nov 2020 06:41)  POCT Blood Glucose.: 82 mg/dL (05 Nov 2020 01:19)  POCT Blood Glucose.: 75 mg/dL (04 Nov 2020 18:27)  POCT Blood Glucose.: 84 mg/dL (04 Nov 2020 11:32)        ABG - ( 03 Nov 2020 08:47 )  pH, Arterial: 7.33  pH, Blood: x     /  pCO2: 41    /  pO2: 123   / HCO3: 21    / Base Excess: -3.8  /  SaO2: 98                    MEDICATIONS  (STANDING):  albumin human 25% IVPB 50 milliLiter(s) IV Intermittent every 6 hours  chlorhexidine 0.12% Liquid 15 milliLiter(s) Oral Mucosa every 12 hours  chlorhexidine 2% Cloths 1 Application(s) Topical daily  dexMEDEtomidine Infusion 0.2 MICROgram(s)/kG/Hr (2.81 mL/Hr) IV Continuous <Continuous>  heparin   Injectable 5000 Unit(s) SubCutaneous every 12 hours  levoFLOXacin IVPB 500 milliGRAM(s) IV Intermittent every 24 hours  midodrine. 10 milliGRAM(s) Oral three times a day  norepinephrine Infusion 0.05 MICROgram(s)/kG/Min (2.63 mL/Hr) IV Continuous <Continuous>  nystatin Powder 1 Application(s) Topical two times a day  pantoprazole  Injectable 40 milliGRAM(s) IV Push two times a day  polyethylene glycol 3350 17 Gram(s) Oral daily  rifAXIMin 550 milliGRAM(s) Oral two times a day    MEDICATIONS  (PRN):  sodium chloride 0.9% lock flush 10 milliLiter(s) IV Push every 1 hour PRN Pre/post blood products, medications, blood draw, and to maintain line patency      RADIOLOGY & ADDITIONAL TESTS:    Imaging Personally Reviewed:  [ ] YES  [ ] NO    REVIEW OF SYSTEMS:  unable   ALLERY AND IMMUNOLOGIC: No hives or eczema      Consultant(s) Notes Reviewed:  [ ] YES  [ ] NO    PHYSICAL EXAM:  GENERAL: NAD, well-groomed, well-developed  HEAD:  Atraumatic, Normocephalic  EYES: EOMI, PERRLA, conjunctiva and sclera clear  ENMT: No tonsillar erythema, exudates, or enlargement; Moist mucous membranes, Good dentition, No lesions  NECK: Supple, No JVD, Normal thyroid  NERVOUS SYSTEM:on vent   CHEST/LUNG: bl rhonchi   HEART: Regular rate and rhythm; No murmurs, rubs, or gallops  ABDOMEN: Soft, Nontender, Nondistended; Bowel sounds present  EXTREMITIES:  2+ Peripheral Pulses, No clubbing, cyanosis, or edema  LYMPH: No lymphadenopathy noted  SKIN: No rashes or lesions    Care Discussed with Consultants/Other Providers [ x] YES  [ ] NO

## 2020-11-05 NOTE — PROGRESS NOTE ADULT - PROBLEM SELECTOR PLAN 5
Met with daughter face to face; ICU attending and PC SW present. Patient's son on the phone. Discussed status; aware patient in MOF including lungs, liver, heart, kidneys. Patient has been intubated since 10/24 and failed SBT today. Family educated patient high risk of reintubation if able to be extubated. Discussed risks vs benefits of reintubation, trach/peg vs withdrawal/comfort care. Family to further discuss and if no significant improvement over the weekend, family to make decision on trach/peg vs withdrawal early next week. Aware that pending status/goals, patient may benefit from IPU. All questions answered; supportive counseling provided. Patient with DNR on file.  Advance Care Planning discussion time: +60 minutes (3:30-4:30PM)

## 2020-11-05 NOTE — PROGRESS NOTE ADULT - ASSESSMENT
AS PER ICU     65y Female from home, lives with daughter, ambulates with walker with PMH of recurrent SBO's, s/p exp lap, SB resection in 2015, ex lap, ALBINA in 2018, DVT, PE, on Xarelto, IVC filter, chronic leg swelling, and anasarca, came in to the ED due to hypotension during office visit. Patient was found to be bacteremic with bacteroids fragilis. Admitted in ICU due to hypotension and hypothermia. On vancomycin and levo . BCx X2 negative. Sputum positive for MRSA and Stenotrophomonas.     Diagnosis:  >Encephalopathy  >Sepsis  >Bacteremia  >Anemia  >DVT/PE  >Transaminitis  >Aspiration pneumonia    --------------------------------------------Neuro--------------------------------------  -She is AAOx2 at baseline on evaluation  -Encephalopathic and got intubated on 10/24  -Ammonia trending down from 152 > 130 > 116 >103>131>126>90>77  -On lactulose 30g q 6h and rifaximin  -INR>1.63>1.71>1.91>1.98> 1.44  -Pt with multiorgan failure secondary to septic shock  -Hold CT head for now as AMS was most likely due to hepatic encephalopathy and pt didn't have any focal neurological deficit when she started having AMS  -her pupils are reactive to light b/l, patient can follow commands   - started on small dose of Precedex for tachypnea      -------------------------------------CVS-------------------------------  -Patient is hypotensive secondary septic shock  - patients started on levophed   -On abx Levo renally dosed , Vancomycin stopped for high vanc trough ,  -NG tube feeding  -Midodrine 10 mg TID  -RIJ for pressors  -Lasix PRN for improving UO  -Pt is getting lasix and albumin on and off   -previous ECHO 3-4 months back showed normal EF but new ECHO showed EF 15-20% with severe left ventricular dysfunction  -pt with multiorgan dysfunction sec to septic shock , mixed venous gas from [facility] showed borderline low O2 saturation , mixed picture of septic /cardiogenic shock   -f/u on autoimmune workup KATHY, anticardiolipin antibodies IgG IgM , ANCA  -Anticardiolipin abs +ve      --------------------------------Respiratory----------------------  -Intubated and sedated on precedex  - s/p lavage and sucction for left sided mucus plaque , with following re expansion   -CT showed mild b/l pleural effusion and left lower lobe consolidation in ICU on day 2  -aspiration pneumonia  - sputum culture grew MRSA and Stenotrophomonas (sensitive to levo)  - will c/w levaquin 500 q24 hours , renal dose and will stop vancomycin given toxic level  -f/u repeat sputum culture  -ID Dr. Patricio    ----------------------------------------Gastrointestinal--------------------------------------  -Patient was bacteremic with bacteroids fragilis, likely secondary from intra abdominal source.   -Patient had multiple SBO in past. 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 Likely due to sepsis vs hepatic failure sec to budd Chiari  -BCx X 2 negative.  -ammonia trends down from 150 to 130 >116>103>131>126>90> 77, on lactulose and rifaximin   -monitor BM, titrate lactulose top 2-3 BM qd   -GI, DR Chatterjee deferred doing colonoscopy due to multiple comorbidities, FOBT positive on 10/15  -Dr Katlyn aguilar has been noted,    -----------------------------------------ID---------------------------------------  -Patient was bacteremic with bacteroids likely GI source.  -Lactate is normal 1.3 but trends up to 2.3 >3.7  -Procalcitonin 0.79  -On levoquin and held vanc due to high trough ,   - BP on the lower side, pt is on Levophed baby dose and midodrine 10mg TID  -Monitor vitals Q4      ---------------------------------------------Nephro-------------------------------------  - hypernatremia improved   - on free water 120 ml q6h  -TF  - Creatinine trending yup , will avoid nephrotoxin , patient in MOF   -Monitor UO, decreased UO with anasarca edema     ----------------------------------------Hem Onc----------------------------------  -Has h/o DVt and PE in past. Patient is on Xarelto at home  -Patient has anemia  -anemia of chronic disease  -Hgb 6.8>1PRBC>8.2> 7.9>7.5  -plt trended up to 96, no active bleeding  - will start on prophylactic dose Ac Heparin 5000 q12 hrs per dr sullivan recommendation   -Folate and B12 normal.   -Dr. Miguel aguilar has been noted, pt started on thiamine for 3 doses.   - will check INR daily     ----------------------------------------Endo--------------------------------  -HgbA1c 4  -Fs q6h    ----------------------------------------Prophylaxis----------------------------  DVT: heparin sq 5000 q12 hrs  GI: PPI    ---------------------------------------GOC---------------------------  -DNR   -Pt with multi organ failure secondary to septic shock with poor prognosis  -Palliative is on board, family meeting for 3PM tomorrow

## 2020-11-05 NOTE — PROGRESS NOTE ADULT - SUBJECTIVE AND OBJECTIVE BOX
back on pressor  sedated  not responding  no fever    ROS:  Negative except for:    MEDICATIONS  (STANDING):  albumin human 25% IVPB 50 milliLiter(s) IV Intermittent every 6 hours  chlorhexidine 0.12% Liquid 15 milliLiter(s) Oral Mucosa every 12 hours  chlorhexidine 2% Cloths 1 Application(s) Topical daily  dexMEDEtomidine Infusion 0.2 MICROgram(s)/kG/Hr (2.81 mL/Hr) IV Continuous <Continuous>  heparin   Injectable 5000 Unit(s) SubCutaneous every 12 hours  levoFLOXacin IVPB 500 milliGRAM(s) IV Intermittent every 24 hours  midodrine. 10 milliGRAM(s) Oral three times a day  norepinephrine Infusion 0.05 MICROgram(s)/kG/Min (2.63 mL/Hr) IV Continuous <Continuous>  nystatin Powder 1 Application(s) Topical two times a day  pantoprazole  Injectable 40 milliGRAM(s) IV Push two times a day  polyethylene glycol 3350 17 Gram(s) Oral daily  rifAXIMin 550 milliGRAM(s) Oral two times a day    MEDICATIONS  (PRN):  sodium chloride 0.9% lock flush 10 milliLiter(s) IV Push every 1 hour PRN Pre/post blood products, medications, blood draw, and to maintain line patency      Allergies    No Known Allergies    Intolerances        Vital Signs Last 24 Hrs  T(C): 37.3 (05 Nov 2020 05:00), Max: 37.8 (05 Nov 2020 00:05)  T(F): 99.1 (05 Nov 2020 05:00), Max: 100 (05 Nov 2020 00:05)  HR: 93 (05 Nov 2020 08:45) (75 - 107)  BP: 111/70 (05 Nov 2020 08:45) (75/51 - 122/85)  BP(mean): 79 (05 Nov 2020 08:45) (55 - 93)  RR: 30 (05 Nov 2020 08:45) (17 - 45)  SpO2: 100% (05 Nov 2020 08:45) (82% - 100%)    PHYSICAL EXAM  General: adult in NAD  HEENT: clear oropharynx, anicteric sclera, pink conjunctiva  Neck: supple  CV: normal S1/S2 with no murmur rubs or gallops  Lungs: positive air movement b/l ant lungs,clear to auscultation, no wheezes, no rales  Abdomen: soft non-tender non-distended, no hepatosplenomegaly  Ext: no clubbing cyanosis or edema  Skin: no rashes and no petechiae  Neuro: alert and oriented X 4, no focal deficits  LABS:                          7.4    9.84  )-----------( 130      ( 05 Nov 2020 03:13 )             21.9         Mean Cell Volume : 91.6 fl  Mean Cell Hemoglobin : 31.0 pg  Mean Cell Hemoglobin Concentration : 33.8 gm/dL  Auto Neutrophil # : 7.89 K/uL  Auto Lymphocyte # : 0.94 K/uL  Auto Monocyte # : 0.87 K/uL  Auto Eosinophil # : 0.01 K/uL  Auto Basophil # : 0.04 K/uL  Auto Neutrophil % : 80.2 %  Auto Lymphocyte % : 9.6 %  Auto Monocyte % : 8.8 %  Auto Eosinophil % : 0.1 %  Auto Basophil % : 0.4 %    Serial CBC  Hematocrit 21.9  Hemoglobin 7.4  Plat 130  RBC 2.39  WBC 9.84  Serial CBC  Hematocrit 22.6  Hemoglobin 7.5  Plat 96  RBC 2.45  WBC 9.36  Serial CBC  Hematocrit 23.6  Hemoglobin 7.9  Plat 75  RBC 2.53  WBC 9.13  Serial CBC  Hematocrit 24.8  Hemoglobin 8.2  Plat 69  RBC 2.67  WBC 10.08  Serial CBC  Hematocrit 24.5  Hemoglobin 8.2  Plat 60  RBC 2.63  WBC 7.99  Serial CBC  Hematocrit 20.7  Hemoglobin 6.8  Plat 56  RBC 2.12  WBC 8.41    11-05    137  |  104  |  36<H>  ----------------------------<  73  4.5   |  24  |  2.54<H>    Ca    8.2<L>      05 Nov 2020 03:13  Phos  3.9     11-05  Mg     2.1     11-05    TPro  5.5<L>  /  Alb  3.2<L>  /  TBili  8.6<H>  /  DBili  x   /  AST  228<H>  /  ALT  88<H>  /  AlkPhos  108  11-05      PT/INR - ( 04 Nov 2020 06:12 )   PT: 16.8 sec;   INR: 1.44 ratio         PTT - ( 04 Nov 2020 06:12 )  PTT:47.5 sec              BLOOD SMEAR INTERPRETATION:       RADIOLOGY & ADDITIONAL STUDIES:

## 2020-11-05 NOTE — PROGRESS NOTE ADULT - PROBLEM SELECTOR PLAN 3
Dx per dietary; 2/2 comorbidities, acute illness. Albumin: 3.2, + temporal wasting, + anasarca. Continues TF via NGT, albumin. High risk for skin breakdown. Family reports significant weight loss prior to hospitalization.

## 2020-11-05 NOTE — PROGRESS NOTE ADULT - ASSESSMENT
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 Repeat CT 10/21 suggested  non-specific enterocolitis, noninfectious inflammatory bowel disease or ischemic bowel along with ileus, and concern for SBO given mild luminal narrowing at distal small bowel. Patient remained hypotensive, hypothermic, requiring pressor support, septic and became encephalopathic and got intubated on 10/24, also diagnosed with HCAP vs. aspirational PNA, sputum growing MRSA and Stenotrophomonas maltophilia.  She was switched to levofloxacin on 10/27 and now on levo plus vancomycin. 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 Liver enzymes were /are up-trending, along with bilirubin, was suspected due to ongoing sepsis. Patient also noted with cardiomyopathy with EF 10-15%. Remains intubated, but with improving mental status. She is back on pressor support.     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 (as per d/w primary team, it was non-conclusive)  - No caudate lobe hypertrophy reported (present in 75% of Budd Chiari cases), no macroregenerative nodules reported, characteristic pattern of parenchymal perfusion not described,, but study reported limited due to motion artifact   - Venography would be gold standard for diagnosis, discussed earlier with IR, if patient will be more stable (possibly outpatient), can be evaluated at [facility] for Dx and potential intervention, however this time patient remains intubated , on pressor and now with EF 10-15 %  - was already on full dose anticoagulation, was being held b/o coagulopathy and now b/o severe thrombocytopenia, AC per primary team/hem  - portal vein and IVC were patent on recent imaging  - Repeat US abd was done, but was limited and no comment either on hepatic vein again or on collaterals  - anticardiolipin antibody pos 1x (prior negative 1x)     # Abnormal liver enzymes with hyperbilirubinemia and coagulopathy, now MOF  # Ascites  - Elevated liver enzymes were thought partially due to ongoing sepsis (only mildly elevated or normal on admission and started to worsen when patient condition deteriorated, became hypotensive, hypothermic; AST 46-28-...248...-104-114-112; ALT 70-45...105-..67-61-61; ALP  113 -245-282; Se bi 0.6-1.4-...5.5-6.2-5.8), now again worsening -228, ALT 72 -88, -108,  se bi 6.0-8.6  - Mgmt. of sepsis per ICU/ID  - Also noted severely reduced LVEF (10-15%) and RV dysfunction, thus hepatic congestion can also contribute (had nl EF in 5/2020)  - Dx paracentesis could be useful (last 2 sets of BCx nl) but as d/w IR, no safe window based on last CT and now small ascites on recent US abdomen  - Prior ascites analysis from 2019: ascites albumin< 0.2, protein < 1; serum albumin 1.0; SAAG< 1.1 (early Budd Chiari would usually give SAAG > 1.1, total protein > 2.5, and in late Budd Chiari SAAG> 1.1 and total protein < 2.5), SAAG < 1.1 with low protein could be due to bowel obstruction/infarction, serositis among others  - prior liver workup during prior admissions: KATHY neg, AMA neg, HBsAg neg, HBcAb IgM neg, HAV IgM neg)  - Given that still up-trending bilirubin, and coagulopathy, resent acute hep panel - negative, EBV - past infection, HSV IgG pos, f/u CMV, VZV, hep E; KATHY neg  - anticardiolipin IgM pos, beta 2 glycoprotein pos     # Encephalopathy, possibly multifactorial; Mental status overall improved, following commands  - c/w lactulose and c/w rifaximin   - consider CT head (was on full dose AC) - was held b/o lack of focal signs  - on low dose precedex for tachypnea    Will continue to follow  D/w primary team  Thank you for the consult

## 2020-11-05 NOTE — PROGRESS NOTE ADULT - ASSESSMENT
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  BC grew bacteroides.    Problem/Recommendation - 1:  Problem: Bacteremia. Recommendation: bacteroides  most likely GI origin  on antibiotics  he has hypothermia and hypotension and elevated lactic acid  in ICU now  multiorgan failure but improving  christel is trending up again  Cr continue to trend up may be due to poor perfusion  Problem/Recommendation - 2:·  Problem: Anemia.  Recommendation: ferritin, b12 folate normal  no hemolysis  most likley due to malnutrition from short bowel syndrome.she also had GIB multiple times before. Problem/Recommendation - 3:·  Problem: Acute deep vein thrombosis (DVT) of other vein of upper extremity.  Recommendation: she has been on xarelto for 2 years.  DVT at left arm and left leg before.  in all CT scan i did not see report of IVC or hepatic vein thrombosis  off xarelto and lovenox due to elevated PT/PTT4. elevated PT/PTT due to malnutrition and antibiotics causing VITK defnow it is mostly recovered  it begins to rise again, correctable by mixing study  probably due to liver failure this time  she develops DVT very quickly while off AC  need DVT prophylaxis, sq heparin  5. thrombocytopenia  new onset, most likely from sepsis or medication, procal is rising  the other possibilties causing multiorgan failure, including thrombocytopenia,  such as autoimmune, vasculitis, HLH, catastrophic APS(less likely)  platelet is trending up, 130 today  6. CHF, new onset global hypokinesia,   cardiomyopathy, ?etiology  will give thiamin  7. infiltrates, CHF vs infection  check COVID again  infiltrates seems better now

## 2020-11-05 NOTE — PROGRESS NOTE ADULT - ASSESSMENT
65 years old female admitted for septic shock secondary to bacteroids bacteremia. Cardiology is consulted because of Possibility of component of cardiogenic shock. Patient EF on this addmisson is 15-20 % (compared to 55 % EF at baseline). Patient EKG shows no ischemic changes. Patient is off pressors on midodrine. Looks likley septic shock then cardiogenic shock. CXR shows some pulmonary congestion and edema on right side       Problem/Recommendation - 1:  Problem: Septic shock. Recommendation: Cardiology is consulted because of Possibility of component of cardiogenic shock. Patient EF on this addmisson is 15-20 % (compared to 55 % EF at baseline).   Patient EKG shows no ischemic changes.   Patient is oback on vaseopresors  Looks likley septic shock then cardiogenic shock.   CXR shows some pulmonary congestion and edema on right side.  input and ouput monitoring.  Patient need ischemic workup once stable for drop in EF.

## 2020-11-05 NOTE — PROGRESS NOTE ADULT - PROBLEM SELECTOR PLAN 1
2/2 shock/bacteremia; involving lungs (patient intubated 10/24; failed SBT today); heart (echo shows EF 15-20%, on pressor); liver (, ALT 88); kidneys (DAHLIA, Cr 2.54). Patient remains sedated/intubated; on pressor, IV abx, albumin. 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 Overall, patient with very poor prognosis. Daughter/Jus is surrogate; DNR on file. Pending status, patient may need trach/peg vs comfort care.

## 2020-11-05 NOTE — PROGRESS NOTE ADULT - PROBLEM SELECTOR PLAN 4
2/2 recurrent hospitalizations, s/p JOHNATHAN. +multi-organ failure. Was ambulatory short distances at home with increasing weakness and +significant weight loss as per family. Patient has been intubated since 10/24 - failed SBT today. On pressor; dependent on ADLs.

## 2020-11-05 NOTE — PROGRESS NOTE ADULT - ASSESSMENT
65y Female from home, lives with daughter, ambulates with walker with PMH of recurrent SBO's, s/p exp lap, SB resection in 2015, ex lap, ALBINA in 2018, DVT, PE, on Xarelto, IVC filter, chronic leg swelling, and anasarca, came in to the ED due to hypotension during office visit. Patient was found to be bacteremic with bacteroids fragilis. Admitted in ICU due to hypotension and hypothermia. On vancomycin and levo . BCx X2 negative. Sputum positive for MRSA and Stenotrophomonas.     Diagnosis:  >Encephalopathy  >Sepsis  >Bacteremia  >Anemia  >DVT/PE  >Transaminitis  >Aspiration pneumonia    --------------------------------------------Neuro--------------------------------------  -She is AAOx2 at baseline on evaluation  -Encephalopathic and got intubated on 10/24  -Ammonia trending down from 152 > 130 > 116 >103>131>126>90>77  -On lactulose 30g q 6h and rifaximin  -INR>1.63>1.71>1.91>1.98> 1.44  -Pt with multiorgan failure secondary to septic shock  -Hold CT head for now as AMS was most likely due to hepatic encephalopathy and pt didn't have any focal neurological deficit when she started having AMS  -her pupils are reactive to light b/l, patient can follow commands   - started on small dose of Precedex for tachypnea      -------------------------------------CVS-------------------------------  -Patient is hypotensive secondary septic shock  - patients started on levophed   -On abx Levo renally dosed , Vancomycin stopped for high vanc trough ,  -NG tube feeding  -Midodrine 10 mg TID  -RIJ for pressors  -Lasix PRN for improving UO  -Pt is getting lasix and albumin on and off   -previous ECHO 3-4 months back showed normal EF but new ECHO showed EF 15-20% with severe left ventricular dysfunction  -pt with multiorgan dysfunction sec to septic shock , mixed venous gas from [facility] showed borderline low O2 saturation , mixed picture of septic /cardiogenic shock   -f/u on autoimmune workup KATHY, anticardiolipin antibodies IgG IgM , ANCA  -Anticardiolipin abs +ve      --------------------------------Respiratory----------------------  -Intubated and sedated on precedex  - s/p lavage and sucction for left sided mucus plaque , with following re expansion   -CT showed mild b/l pleural effusion and left lower lobe consolidation in ICU on day 2  -aspiration pneumonia  - sputum culture grew MRSA and Stenotrophomonas (sensitive to levo)  - will c/w levaquin 500 q24 hours , renal dose and will stop vancomycin given toxic level  -f/u repeat sputum culture  -ID Dr. Patricio    ----------------------------------------Gastrointestinal--------------------------------------  -Patient was bacteremic with bacteroids fragilis, likely secondary from intra abdominal source.   -Patient had multiple SBO in past. 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 Likely due to sepsis vs hepatic failure sec to budd Chiari  -BCx X 2 negative.  -ammonia trends down from 150 to 130 >116>103>131>126>90> 77, on lactulose and rifaximin   -monitor BM, titrate lactulose top 2-3 BM qd   -GI, DR Chatterjee deferred doing colonoscopy due to multiple comorbidities, FOBT positive on 10/15  -Dr Katlyn aguilar has been noted,    -----------------------------------------ID---------------------------------------  -Patient was bacteremic with bacteroids likely GI source.  -Lactate is normal 1.3 but trends up to 2.3 >3.7  -Procalcitonin 0.79  -On levoquin and held vanc due to high trough ,   - BP on the lower side, pt is on Levophed baby dose and midodrine 10mg TID  -Monitor vitals Q4      ---------------------------------------------Nephro-------------------------------------  - hypernatremia improved   - on free water 120 ml q6h  -TF  - Creatinine trending yup , will avoid nephrotoxin , patient in MOF   -Monitor UO, decreased UO with anasarca edema     ----------------------------------------Hem Onc----------------------------------  -Has h/o DVt and PE in past. Patient is on Xarelto at home  -Patient has anemia  -anemia of chronic disease  -Hgb 6.8>1PRBC>8.2> 7.9>7.5  -plt trended up to 96, no active bleeding  - will start on prophylactic dose Ac Heparin 5000 q12 hrs per dr sullivan recommendation   -Folate and B12 normal.   -Dr. Miguel aguilar has been noted, pt started on thiamine for 3 doses.   - will check INR daily     ----------------------------------------Endo--------------------------------  -HgbA1c 4  -Fs q6h    ----------------------------------------Prophylaxis----------------------------  DVT: heparin sq 5000 q12 hrs  GI: PPI    ---------------------------------------GOC---------------------------  -DNR   -Pt with multi organ failure secondary to septic shock with poor prognosis  -Palliative is on board, family meeting for 3PM tomorrow       65y Female from home, lives with daughter, ambulates with walker with PMH of recurrent SBO's, s/p exp lap, SB resection in 2015, ex lap, ALBINA in 2018, DVT, PE, on Xarelto, IVC filter, chronic leg swelling, and anasarca, came in to the ED due to hypotension during office visit. Patient was found to be bacteremic with bacteroids fragilis. Admitted in ICU due to hypotension and hypothermia. On vancomycin and levo . BCx X2 negative. Sputum positive for MRSA and Stenotrophomonas.     Diagnosis:  >Encephalopathy  >Sepsis  >Bacteremia  >Anemia  >DVT/PE  >Transaminitis  >Aspiration pneumonia    --------------------------------------------Neuro--------------------------------------  -She is AAOx2 at baseline on evaluation  -Encephalopathic and got intubated on 10/24  -Ammonia trending down from 152 > 130 > 116 >103>131>126>90>77  -On lactulose 30g q 8h and rifaximin, goal BM 2-3   -INR>1.63>1.71>1.91>1.98> 1.44>  -Pt with multiorgan failure secondary to septic shock  -Hold CT head for now as AMS was most likely due to hepatic encephalopathy and pt didn't have any focal neurological deficit when she started having AMS  -her pupils are reactive to light b/l, patient can follow commands   - started on small dose of Precedex for tachypnea      -------------------------------------CVS-------------------------------  -Patient is hypotensive secondary septic shock  - patients started on levophed   -On abx Levo renally dosed , Vancomycin stopped for high vanc trough ,  -NG tube feeding  -Midodrine 10 mg TID  -RIJ for pressors  -Lasix PRN for improving UO  -Pt is getting lasix and albumin on and off   -previous ECHO 3-4 months back showed normal EF but new ECHO showed EF 15-20% with severe left ventricular dysfunction  -pt with multiorgan dysfunction sec to septic shock , mixed venous gas from [facility] showed borderline low O2 saturation , mixed picture of septic /cardiogenic shock   -f/u on autoimmune workup KATHY, anticardiolipin antibodies IgG IgM , ANCA  -Anticardiolipin abs +ve      --------------------------------Respiratory----------------------  - failed sbt   -Intubated and sedated on precedex  - s/p lavage and sucction for left sided mucus plaque , with following re expansion   -CT showed mild b/l pleural effusion and left lower lobe consolidation in ICU on day 2  -aspiration pneumonia  - sputum culture grew MRSA and Stenotrophomonas (sensitive to levo)  - will c/w levaquin 500 q24 hours , renal dose and will stop vancomycin given toxic level  -f/u repeat sputum culture  -ID Dr. Patricio    ----------------------------------------Gastrointestinal--------------------------------------  -Patient was bacteremic with bacteroids fragilis, likely secondary from intra abdominal source.   -Patient had multiple SBO in past. 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 Likely due to sepsis vs hepatic failure sec to budd Chiari  -BCx X 2 negative.  -ammonia trends down from 150 to 130 >116>103>131>126>90> 77, on lactulose and rifaximin   -monitor BM, titrate lactulose top 2-3 BM qd   -GI, DR Chatterjee deferred doing colonoscopy due to multiple comorbidities, FOBT positive on 10/15  -Dr Katlyn aguilar has been noted,    -----------------------------------------ID---------------------------------------  -Patient was bacteremic with bacteroids likely GI source.  -Lactate is normal 1.3 but trends up to 2.3 >3.7  -Procalcitonin 0.79  -On levoquin and held vanc due to high trough ,   - BP on the lower side, pt is on Levophed baby dose and midodrine 10mg TID  -Monitor vitals Q4      ---------------------------------------------Nephro-------------------------------------  - hypernatremia improved   - on free water 120 ml q6h  -TF  - Creatinine trending yup , will avoid nephrotoxin , patient in MOF   -Monitor UO, decreased UO with anasarca edema     ----------------------------------------Hem Onc----------------------------------  -Has h/o DVt and PE in past. Patient is on Xarelto at home  -Patient has anemia  -anemia of chronic disease  -Hgb 6.8>1PRBC>8.2> 7.9>7.5>7.4  -plt trended up to 96, no active bleeding  - will start on prophylactic dose Ac Heparin 5000 q12 hrs per dr sullivna recommendation   -Folate and B12 normal.   -Dr. Miguel aguilar has been noted, pt started on thiamine for 3 doses.   - will check INR daily     ----------------------------------------Endo--------------------------------  -HgbA1c 4  -Fs q6h    ----------------------------------------Prophylaxis----------------------------  DVT: heparin sq 5000 q12 hrs  GI: PPI    ---------------------------------------GOC---------------------------  -DNR   -Pt with multi organ failure secondary to septic shock with poor prognosis  -Palliative is on board, family meeting for 3PM

## 2020-11-05 NOTE — CHART NOTE - NSCHARTNOTEFT_GEN_A_CORE
PC , PC NP, ICU attending, patient's daughter Jus Bucio and son had a family meeting to discuss goals of care.  Patient's son was conferenced in via telephone.  ICU attending provided a clinical update stating the patient was intubated on 10-24-20 and failed SBT today.  In the event the patient can not be weaned from the ventilator over the next several days, the family will need to consider trach/peg versus compassionate extubation.  Patient is currently in multi-organ failure, therefore a trach/peg will not improve the patient's cardiac function.  In addition, patient would need to be evaluated for both procedures prior to the family making a decision.  Education was provided regarding risk of reintubation, should the patient tolerate extubation, compassionate extubation and in patient hospice.  PC SW attempted to elicit the family's thoughts, concerns, and fears, however they appeared guarded and did not share this information.  Emotional support was provided throughout the meeting including acknowledgement of the difficulty of these decisions.  PC SW provided her contact information and encouraged the patient's daughter to contact her should any needs arise prior to their next discussion.  ICU attending stated he and the family will speak again within the next few days.  PC Sw will remain available as needed.

## 2020-11-05 NOTE — PROGRESS NOTE ADULT - ASSESSMENT
Patient is a 65y old  Female from home, lives with daughter, ambulates with walker with PMH of recurrent SBO's, s/p exp lap, SB resection in 2015, ex lap, ALBINA in 2018, DVT, PE, on Xarelto, IVC filter, chronic leg swelling, and anasarca, Now send in to the ER by her PCP, Dr. Ross, for evaluation of  generalized weakness and hypotension BP of 80/40 during office visit. On admission, she found to have no fever and BP was in lower normal range and no Leukocytosis. The CXR is clear and CT abd/pelvis consistent with Ileus. The ID consult requested to assist with evaluation of infectious etiology of episode of hypotension. Found to have Bacteroides bacteremia and now developed septic shock on Cefepime and Flagyl. Hence transferred to ICU. 10/20/20.    # Hypotension  at office- BP of 80/40 - resolved   #  Bacteroides fragilis Bacteremia - 10/13/20 - ? source most likely GI- Repeat Blood Cxs have NGTD 10/16/20  # Ileus  - h/o recurrent SBO and s/p EXp. LAp  # COVID 19 negative   # Septic shock ( Hypothermia + hypotensive)- transferred to ICU since requiring pressor- source GI, The CT abd/pelvis shows nonspecific enterocolitis, noninfectious inflammatory bowel disease, or ischemic bowel, along with ileus.   # Pneumonia- HCAP vs Aspiration - on CXR 10/24 and CT chest 10/21- sputum Cx grew Methicillin resistant Staphylococcus aureus  and Stenotrophomonas maltophilia.      would recommend:    1. Check Vancomycin  level daily  2. Monitor kidney function and adjust Abx doses accordingly   3. Continue renally adjusted doses Levaquin to cover Stenotrophomonas  4. Aspiration precaution  5. Management of Vent as per ICU protocol      d/w ICU team     Attending Attestation:    Spent more than 45 minutes on total encounter, more than 50 % of the visit was spent counseling and/or coordinating care by the Attending physician.

## 2020-11-05 NOTE — PROGRESS NOTE ADULT - SUBJECTIVE AND OBJECTIVE BOX
Patient is seen and examined at the bed side, remains afebrile  but went back on pressor.  The creatinine is trending up.      REVIEW OF SYSTEMS: Unable to obtain due to mental status         ALLERGIES: No Known Allergies        ICU Vital Signs Last 24 Hrs  T(C): 37.7 (05 Nov 2020 09:00), Max: 37.8 (05 Nov 2020 00:05)  T(F): 99.9 (05 Nov 2020 09:00), Max: 100 (05 Nov 2020 00:05)  HR: 91 (05 Nov 2020 14:00) (75 - 107)  BP: 116/73 (05 Nov 2020 14:00) (75/51 - 122/85)  BP(mean): 83 (05 Nov 2020 14:00) (55 - 93)  ABP: --  ABP(mean): --  RR: 29 (05 Nov 2020 14:00) (17 - 41)  SpO2: 100% (05 Nov 2020 14:00) (82% - 100%)        PHYSICAL EXAM:  GENERAL: Intubated /vented,   CHEST/LUNG: Not using accessory muscles   HEART: s1 and s2 present  ABDOMEN:  Nontender and  Nondistended  EXTREMITIES: B/L UE edematous  CNS: Intubated/vented, awake          LABS:                        7.4    9.84  )-----------( 130      ( 05 Nov 2020 03:13 )             21.9                                    7.5    9.36  )-----------( 96       ( 04 Nov 2020 06:12 )             22.6              11-05    137  |  104  |  36<H>  ----------------------------<  73  4.5   |  24  |  2.54<H>    Ca    8.2<L>      05 Nov 2020 03:13  Phos  3.9     11-05  Mg     2.1     11-05    TPro  5.5<L>  /  Alb  3.2<L>  /  TBili  8.6<H>  /  DBili  x   /  AST  228<H>  /  ALT  88<H>  /  AlkPhos  108  11-05 11-04    136  |  104  |  29<H>  ----------------------------<  94  4.5   |  22  |  2.25<H>    Ca    8.0<L>      04 Nov 2020 06:12  Phos  3.1     11-04  Mg     2.1     11-04    TPro  5.4<L>  /  Alb  2.9<L>  /  TBili  6.0<H>  /  DBili  x   /  AST  165<H>  /  ALT  72<H>  /  AlkPhos  142<H>  11-04        Vancomycin Level, Trough (11.04.20 @ 06:12)   Vancomycin Level, Trough: 31.0:     Vancomycin Level, Trough (11.03.20 @ 04:11)   Vancomycin Level, Trough: 16.1:    Vancomycin Level, Trough (11.01.20 @ 04:59)   Vancomycin Level, Trough: 20.9:    Vancomycin Level, Trough (10.31.20 @ 06:54)   Vancomycin Level, Trough: 20.5:    Vancomycin Level, Trough (10.30.20 @ 06:30)   Vancomycin Level, Trough: 28.1:    Vancomycin Level, Trough (10.29.20 @ 05:11)   Vancomycin Level, Trough: 36.3    Procalcitonin, Serum (10.15.20 @ 11:48)   Procalcitonin, Serum: 0.16: Procalcitonin (PCT) Interpretation (ng/mL) - Diagnosis of systemic   bacterial infection/sepsis         MEDICATIONS  (STANDING):    albumin human 25% IVPB 50 milliLiter(s) IV Intermittent every 6 hours  chlorhexidine 0.12% Liquid 15 milliLiter(s) Oral Mucosa every 12 hours  chlorhexidine 2% Cloths 1 Application(s) Topical daily  dexMEDEtomidine Infusion 0.2 MICROgram(s)/kG/Hr (2.81 mL/Hr) IV Continuous <Continuous>  heparin   Injectable 5000 Unit(s) SubCutaneous every 12 hours  lactulose Syrup 10 Gram(s) Oral daily  levoFLOXacin IVPB 500 milliGRAM(s) IV Intermittent every 24 hours  midodrine. 10 milliGRAM(s) Oral three times a day  norepinephrine Infusion 0.05 MICROgram(s)/kG/Min (2.63 mL/Hr) IV Continuous <Continuous>  nystatin Powder 1 Application(s) Topical two times a day  pantoprazole  Injectable 40 milliGRAM(s) IV Push two times a day  rifAXIMin 550 milliGRAM(s) Oral two times a day        RADIOLOGY & ADDITIONAL TESTS:    11/4/20 : Xray Chest 1 View- PORTABLE-Routine (Xray Chest 1 View- PORTABLE-Routine in AM.) (11.04.20 @ 10:59) Reexpansion of the left lung with residual left pleural effusion and/or infiltrate.  Right pulmonary edema unchanged.  Tubes and catheters in satisfactory position.      10/25/20: Xray Chest 1 View- PORTABLE-Routine (Xray Chest 1 View- PORTABLE-Routine in AM.) (10.25.20 @ 09:21) Frontal expiratory view of the chest shows the heart to be similar in size. Endotracheal tube, right jugular line and feeding tube remain present.    The lungs show similar left upper lobe infiltrate with progression of right perihilar infiltrate. Pleural effusions are similar. There is no evidence of pneumothorax.      10/23/20 : Xray Chest 1 View-PORTABLE IMMEDIATE (Xray Chest 1 View-PORTABLE IMMEDIATE .) (10.23.20 @ 19:09) Feeding tube tip near GE junction as noted. Questionable right upper lobe infiltrate. Follow-up study is recommended as clinically warranted.      10/21/20 : CT Abdomen and Pelvis w/ Oral Cont and w/ IV Cont (10.21.20 @ 15:59) Mural thickening of the left colon and rectum. Liquid stool in the colon. Apparent segmental mural thickening of the mid to distal small bowel and the proximal duodenum. Findings may represent nonspecific enterocolitis, noninfectious inflammatory bowel disease, or ischemic bowel. Clinical correlation is recommended. The celiac axis artery, SMA, and KINA are patent without stenosis.    Dilatation of the mid small bowel is again noted. Oral contrast has reached the terminal ileum. Findings may represent small bowel obstruction, or ileus related to nonspecific enterocolitis.   Cholelithiasis.    Moderate to large ascites in the abdomen, increased since the previous examination. 5 mm nonspecific noncalcified left upper lobe lung nodule; if the patient's is in the high risk category (i.e. smoker), follow-up chest CT may be pursued in 12 months to ensure stability.    Combination of atelectasis and consolidation in the left lower lobe.    Possible 1.0 cm hypodense lesion in the left lobe of the thyroid. Thyroid ultrasound may be pursued for further evaluation.   Mild bilateral pleural effusions.    Aging determinate compression fracture at T5 vertebra.        10/13/20 : CT Abdomen and Pelvis w/ IV Cont (10.13.20 @ 18:50) Diffuse dilatation of small bowel loops without a clear transition is slightly increased, likely an ileus.  Decreased moderate ascites.    1.5 cm pancreatic versus peripancreatic soft tissue nodule    10/13/20 : Xray Chest 1 View- PORTABLE-Urgent (Xray Chest 1 View- PORTABLE-Urgent .) (10.13.20 @ 16:34) Clear lungs.          MICROBIOLOGY DATA:    Culture - Sputum . (10.26.20 @ 00:26)   - Ampicillin/Sulbactam: R <=8/4   - Cefazolin: R >16   - Ceftazidime: I 16   - Clindamycin: R >4   - Erythromycin: R >4   - Gentamicin: S <=1 Should not be used as monotherapy   - Levofloxacin: S <=0.5   - Linezolid: S 2   - Oxacillin: R >2   - Penicillin: R >8   - RIF- Rifampin: S <=1 Should not be used as monotherapy   - Tetra/Doxy: S <=1   - Trimethoprim/Sulfamethoxazole: S <=0.5/9.5   - Trimethoprim/Sulfamethoxazole: S <=0.5/9.5   - Vancomycin: S 1   Gram Stain:  Moderate polymorphonuclear leukocytes per low power field   Rare Squamous epithelial cells per low power field   Moderate Gram Positive Cocci in Clusters per oil power field   Moderate Yeast per oil power field   Specimen Source: .Sputum Sputum   Culture Results:  Moderate Methicillin resistant Staphylococcus aureus   Moderate Stenotrophomonas maltophilia   Normal Respiratory Erin present   Organism Identification: Methicillin resistant Staphylococcus aureus   Stenotrophomonas maltophilia   Organism: Methicillin resistant Staphylococcus aureus   Organism: Stenotrophomonas maltophilia       MRSA/MSSA PCR (10.21.20 @ 09:41)   MRSA PCR Result.: Detected:       Culture - Blood (10.20.20 @ 22:09)   Specimen Source: .Blood Blood   Culture Results:  No growth to date.     Culture - Blood (10.20.20 @ 22:09)   Specimen Source: .Blood Blood   Culture Results:   No growth to date.     Culture - Blood in AM (10.16.20 @ 10:13)   Specimen Source: .Blood Blood-Peripheral   Culture Results:   No growth to date.     Culture - Blood in AM (10.16.20 @ 10:13)   Specimen Source: .Blood Blood-Peripheral   Culture Results:   No growth to date.     Culture - Blood (10.13.20 @ 22:23)   Growth in anaerobic bottle: Gram Negative Rods   Specimen Source: .Blood Blood-Peripheral   Organism: Blood Culture PCR   Culture Results:   Growth in anaerobic bottle: Bacteroides fragilis   "Susceptibilities not performed"     Culture - Blood (10.13.20 @ 22:23)   Specimen Source: .Blood Blood-Peripheral   Culture Results:   No growth to date.     Urine Microscopic-Add On (NC) (10.13.20 @ 21:39)   Bacteria: Moderate /HPF   Epithelial Cells: Moderate /HPF   Red Blood Cell - Urine: 5-10 /HPF   White Blood Cell - Urine: 6-10 /HPF

## 2020-11-05 NOTE — PROGRESS NOTE ADULT - SUBJECTIVE AND OBJECTIVE BOX
Chief Complaint:  Patient is a 65y old  Female who presents with a chief complaint of Hypotension (2020 09:02)      Reason for consult:    Interval Events:     Hospital Medications:  albumin human 25% IVPB 50 milliLiter(s) IV Intermittent every 6 hours  chlorhexidine 0.12% Liquid 15 milliLiter(s) Oral Mucosa every 12 hours  chlorhexidine 2% Cloths 1 Application(s) Topical daily  dexMEDEtomidine Infusion 0.2 MICROgram(s)/kG/Hr IV Continuous <Continuous>  heparin   Injectable 5000 Unit(s) SubCutaneous every 12 hours  lactulose Syrup 10 Gram(s) Oral daily  levoFLOXacin IVPB 500 milliGRAM(s) IV Intermittent every 24 hours  midodrine. 10 milliGRAM(s) Oral three times a day  norepinephrine Infusion 0.05 MICROgram(s)/kG/Min IV Continuous <Continuous>  nystatin Powder 1 Application(s) Topical two times a day  pantoprazole  Injectable 40 milliGRAM(s) IV Push two times a day  rifAXIMin 550 milliGRAM(s) Oral two times a day  sodium chloride 0.9% lock flush 10 milliLiter(s) IV Push every 1 hour PRN      ROS:   General:  No  fevers, chills, night sweats, fatigue  Eyes:  Good vision, no reported pain  ENT:  No sore throat, pain, runny nose  CV:  No pain, palpitations  Pulm:  No dyspnea, cough  GI:  See HPI, otherwise negative  :  No  incontinence, nocturia  Muscle:  No pain, weakness  Neuro:  No memory problems  Psych:  No insomnia, mood problems, depression  Endocrine:  No polyuria, polydipsia, cold/heat intolerance  Heme:  No petechiae, ecchymosis, easy bruisability  Skin:  No rash    PHYSICAL EXAM:   Vital Signs:  Vital Signs Last 24 Hrs  T(C): 37.7 (2020 09:00), Max: 37.8 (2020 00:05)  T(F): 99.9 (2020 09:00), Max: 100 (2020 00:05)  HR: 91 (2020 14:00) (75 - 107)  BP: 116/73 (2020 14:00) (75/51 - 122/85)  BP(mean): 83 (2020 14:00) (55 - 93)  RR: 29 (2020 14:00) (17 - 41)  SpO2: 100% (2020 14:00) (82% - 100%)  Daily     Daily Weight in k.1 (2020 09:00)    GENERAL: no acute distress  NEURO: alert, no asterixis  HEENT: anicteric sclera, no conjunctival pallor appreciated  CHEST: no respiratory distress, no accessory muscle use  CARDIAC: regular rate, rhythm  ABDOMEN: soft, non-tender, non-distended, no rebound or guarding  EXTREMITIES: warm, well perfused, no edema  SKIN: no lesions noted    LABS: reviewed                        7.4    9.84  )-----------( 130      ( 2020 03:13 )             21.9     11-05    137  |  104  |  36<H>  ----------------------------<  73  4.5   |  24  |  2.54<H>    Ca    8.2<L>      2020 03:13  Phos  3.9     11-05  Mg     2.1     11-05    TPro  5.5<L>  /  Alb  3.2<L>  /  TBili  8.6<H>  /  DBili  x   /  AST  228<H>  /  ALT  88<H>  /  AlkPhos  108  11-05    LIVER FUNCTIONS - ( 2020 03:13 )  Alb: 3.2 g/dL / Pro: 5.5 g/dL / ALK PHOS: 108 U/L / ALT: 88 U/L DA / AST: 228 U/L / GGT: x             Interval Diagnostic Studies: see sunrise for full report   Chief Complaint:  Patient is a 65y old  Female who presents with a chief complaint of Hypotension (2020 09:02)      Reason for consult: r/o Budd Chiari Interval Events: Patient was seen and examined at bedside. Remains intubated, but awake, alert, following commands, attempts to talk. ICU team will have C discussion with family this afternoon.     Hospital Medications:  albumin human 25% IVPB 50 milliLiter(s) IV Intermittent every 6 hours  chlorhexidine 0.12% Liquid 15 milliLiter(s) Oral Mucosa every 12 hours  chlorhexidine 2% Cloths 1 Application(s) Topical daily  dexMEDEtomidine Infusion 0.2 MICROgram(s)/kG/Hr IV Continuous <Continuous>  heparin   Injectable 5000 Unit(s) SubCutaneous every 12 hours  lactulose Syrup 10 Gram(s) Oral daily  levoFLOXacin IVPB 500 milliGRAM(s) IV Intermittent every 24 hours  midodrine. 10 milliGRAM(s) Oral three times a day  norepinephrine Infusion 0.05 MICROgram(s)/kG/Min IV Continuous <Continuous>  nystatin Powder 1 Application(s) Topical two times a day  pantoprazole  Injectable 40 milliGRAM(s) IV Push two times a day  rifAXIMin 550 milliGRAM(s) Oral two times a day  sodium chloride 0.9% lock flush 10 milliLiter(s) IV Push every 1 hour PRN      ROS:   Unable to obtain due to patient condition.     PHYSICAL EXAM:   Vital Signs:  Vital Signs Last 24 Hrs  T(C): 37.7 (2020 09:00), Max: 37.8 (2020 00:05)  T(F): 99.9 (2020 09:00), Max: 100 (2020 00:05)  HR: 91 (2020 14:00) (75 - 107)  BP: 116/73 (2020 14:00) (75/51 - 122/85)  BP(mean): 83 (2020 14:00) (55 - 93)  RR: 29 (:00) (17 - 41)  SpO2: 100% (2020 14:00) (82% - 100%)  Daily     Daily Weight in k.1 (2020 09:00)    GENERAL: ill appearing  NEURO: awake, alert, following commands, attempts to talk  HEENT: icteric sclera  CHEST: intubated, on vent  CARDIAC: regular rate, rhythm  ABDOMEN: soft, non-tender, non-distended, no rebound or guarding  EXTREMITIES: B/l LE edema  SKIN: small hemorrhagic bullae on LE    LABS: reviewed                        7.4    9.84  )-----------( 130      ( 2020 03:13 )             21.9     11-05    137  |  104  |  36<H>  ----------------------------<  73  4.5   |  24  |  2.54<H>    Ca    8.2<L>      2020 03:13  Phos  3.9     11-  Mg     2.1     11-    TPro  5.5<L>  /  Alb  3.2<L>  /  TBili  8.6<H>  /  DBili  x   /  AST  228<H>  /  ALT  88<H>  /  AlkPhos  108  11-05    LIVER FUNCTIONS - ( 2020 03:13 )  Alb: 3.2 g/dL / Pro: 5.5 g/dL / ALK PHOS: 108 U/L / ALT: 88 U/L DA / AST: 228 U/L / GGT: x             Interval Diagnostic Studies: see sunrise for full report

## 2020-11-05 NOTE — PROGRESS NOTE ADULT - SUBJECTIVE AND OBJECTIVE BOX
SUBJ: Patient is back on vasopressor support. Continues to be intubated      MEDICATIONS  (STANDING):  albumin human 25% IVPB 50 milliLiter(s) IV Intermittent every 6 hours  chlorhexidine 0.12% Liquid 15 milliLiter(s) Oral Mucosa every 12 hours  chlorhexidine 2% Cloths 1 Application(s) Topical daily  dexMEDEtomidine Infusion 0.2 MICROgram(s)/kG/Hr (2.81 mL/Hr) IV Continuous <Continuous>  heparin   Injectable 5000 Unit(s) SubCutaneous every 12 hours  lactulose Syrup 10 Gram(s) Oral daily  levoFLOXacin IVPB 500 milliGRAM(s) IV Intermittent every 24 hours  midodrine. 10 milliGRAM(s) Oral three times a day  norepinephrine Infusion 0.05 MICROgram(s)/kG/Min (2.63 mL/Hr) IV Continuous <Continuous>  nystatin Powder 1 Application(s) Topical two times a day  pantoprazole  Injectable 40 milliGRAM(s) IV Push two times a day  rifAXIMin 550 milliGRAM(s) Oral two times a day    MEDICATIONS  (PRN):  sodium chloride 0.9% lock flush 10 milliLiter(s) IV Push every 1 hour PRN Pre/post blood products, medications, blood draw, and to maintain line patency            Vital Signs Last 24 Hrs  T(C): 37.7 (05 Nov 2020 09:00), Max: 37.8 (05 Nov 2020 00:05)  T(F): 99.9 (05 Nov 2020 09:00), Max: 100 (05 Nov 2020 00:05)  HR: 91 (05 Nov 2020 14:00) (75 - 107)  BP: 116/73 (05 Nov 2020 14:00) (75/51 - 122/85)  BP(mean): 83 (05 Nov 2020 14:00) (55 - 93)  RR: 29 (05 Nov 2020 14:00) (17 - 41)  SpO2: 100% (05 Nov 2020 14:00) (82% - 100%)    REVIEW OF SYSTEMS:  CONSTITUTIONAL: No fever, weight loss, or fatigue  EYES: No eye pain, visual disturbances, or discharge  ENMT:  No difficulty hearing, tinnitus, vertigo; No sinus or throat pain  NECK: No pain or stiffness  RESPIRATORY: No cough, wheezing, chills or hemoptysis; No shortness of breath  CARDIOVASCULAR: No chest pain, palpitations, dizziness, or leg swelling  GASTROINTESTINAL: No abdominal or epigastric pain. No nausea, vomiting, or hematemesis; No diarrhea or constipation. No melena or hematochezia.  GENITOURINARY: No dysuria, frequency, hematuria, or incontinence  NEUROLOGICAL: No headaches, memory loss, loss of strength, numbness, or tremors  SKIN: No itching, burning, rashes, or lesions   LYMPH NODES: No enlarged glands  ENDOCRINE: No heat or cold intolerance; No hair loss  MUSCULOSKELETAL: No joint pain or swelling; No muscle, back, or extremity pain  PSYCHIATRIC: No depression, anxiety, mood swings, or difficulty sleeping  HEME/LYMPH: No easy bruising, or bleeding gums  ALLERY AND IMMUNOLOGIC: No hives or eczema    PHYSICAL EXAM:  · CONSTITUTIONAL:	Well-developed, well nourished    BMI-  · EYES:	EOMI; PERRL; no drainage or redness  · ENMT	No oral lesions; no gross abnormalities  · NECK:	No bruits; no thyromegaly or nodules  ·BACK:	No deformity or limitation of movement  ·RESPIRATORY:   airway patent; breath sounds equal; good air movement; respirations non-labored; clear to auscultation bilaterally; no chest wall tenderness; no intercostal retractions; no rales,rhonchi or wheeze  · CARDIOVASCULAR	regular rate and rhythm  no rub  no murmur  normal PMI  . GASTROINTESTINAL:  no distention; no masses palpable; bowel sounds normal; no rebound tenderness; no guarding; no rigidity; no organomegaly  · EXTREMITIES: No cyanosis, clubbing or edema  · VASCULAR: 	Equal and normal pulses (carotid, femoral, dorsalis pedis)  ·NEUROLOGICAL:   alert and oriented x 3; sensation intact; deep reflexes intact; cranial nerves intact; no spontaneous movement; superficial reflexes intact; normal strength  · SKIN:	No lesions; no rash  . LYMPH NODES:	No lymphadedenopathy  · MUSCULOSKELETAL:   No calf tenderness  no joint swelling	  TELEMETRY:    ECG:    TTE:    LABS:                        7.4    9.84  )-----------( 130      ( 05 Nov 2020 03:13 )             21.9     11-05    137  |  104  |  36<H>  ----------------------------<  73  4.5   |  24  |  2.54<H>    Ca    8.2<L>      05 Nov 2020 03:13  Phos  3.9     11-05  Mg     2.1     11-05    TPro  5.5<L>  /  Alb  3.2<L>  /  TBili  8.6<H>  /  DBili  x   /  AST  228<H>  /  ALT  88<H>  /  AlkPhos  108  11-05        PT/INR - ( 04 Nov 2020 06:12 )   PT: 16.8 sec;   INR: 1.44 ratio         PTT - ( 04 Nov 2020 06:12 )  PTT:47.5 sec  Creatinine Trend: 2.54<--, 2.25<--, 2.06<--, 2.00<--, 1.85<--, 1.73<--  I&O's Summary    04 Nov 2020 07:01  -  05 Nov 2020 07:00  --------------------------------------------------------  IN: 713 mL / OUT: 400 mL / NET: 313 mL    05 Nov 2020 07:01  -  05 Nov 2020 15:07  --------------------------------------------------------  IN: 116.6 mL / OUT: 410 mL / NET: -293.4 mL      BNP  RADIOLOGY & ADDITIONAL STUDIES:    IMPRESSION AND PLAN:

## 2020-11-05 NOTE — PROGRESS NOTE ADULT - PROBLEM SELECTOR PLAN 2
2/2 shock/bacteremia; comorbid MOF. Patient remains sedated/intubated; on pressor, IV abx for bacteroides bacteremia, likely gi source. Overall, very poor prognosis. Daughter/Jus is surrogate; DNR on file.

## 2020-11-05 NOTE — PROGRESS NOTE ADULT - SUBJECTIVE AND OBJECTIVE BOX
INTERVAL HPI/OVERNIGHT EVENTS: ***    PRESSORS: [ ] YES [ ] NO  WHICH:    ANTIBIOTICS:                  DATE STARTED:  ANTIBIOTICS:                  DATE STARTED:  ANTIBIOTICS:                  DATE STARTED:    Antimicrobial:  levoFLOXacin IVPB 500 milliGRAM(s) IV Intermittent every 24 hours  rifAXIMin 550 milliGRAM(s) Oral two times a day    Cardiovascular:  midodrine. 10 milliGRAM(s) Oral three times a day  norepinephrine Infusion 0.05 MICROgram(s)/kG/Min IV Continuous <Continuous>    Pulmonary:    Hematalogic:  heparin   Injectable 5000 Unit(s) SubCutaneous every 12 hours    Other:  albumin human 25% IVPB 50 milliLiter(s) IV Intermittent every 6 hours  chlorhexidine 0.12% Liquid 15 milliLiter(s) Oral Mucosa every 12 hours  chlorhexidine 2% Cloths 1 Application(s) Topical daily  dexMEDEtomidine Infusion 0.2 MICROgram(s)/kG/Hr IV Continuous <Continuous>  nystatin Powder 1 Application(s) Topical two times a day  pantoprazole  Injectable 40 milliGRAM(s) IV Push two times a day  polyethylene glycol 3350 17 Gram(s) Oral daily  sodium chloride 0.9% lock flush 10 milliLiter(s) IV Push every 1 hour PRN    albumin human 25% IVPB 50 milliLiter(s) IV Intermittent every 6 hours  chlorhexidine 0.12% Liquid 15 milliLiter(s) Oral Mucosa every 12 hours  chlorhexidine 2% Cloths 1 Application(s) Topical daily  dexMEDEtomidine Infusion 0.2 MICROgram(s)/kG/Hr IV Continuous <Continuous>  heparin   Injectable 5000 Unit(s) SubCutaneous every 12 hours  levoFLOXacin IVPB 500 milliGRAM(s) IV Intermittent every 24 hours  midodrine. 10 milliGRAM(s) Oral three times a day  norepinephrine Infusion 0.05 MICROgram(s)/kG/Min IV Continuous <Continuous>  nystatin Powder 1 Application(s) Topical two times a day  pantoprazole  Injectable 40 milliGRAM(s) IV Push two times a day  polyethylene glycol 3350 17 Gram(s) Oral daily  rifAXIMin 550 milliGRAM(s) Oral two times a day  sodium chloride 0.9% lock flush 10 milliLiter(s) IV Push every 1 hour PRN    Drug Dosing Weight  Height (cm): 167.6 (21 Oct 2020 02:26)  Weight (kg): 56.2 (21 Oct 2020 02:26)  BMI (kg/m2): 20 (21 Oct 2020 02:26)  BSA (m2): 1.63 (21 Oct 2020 02:26)    CENTRAL LINE: [ ] YES [ ] NO  LOCATION:         TREJO: [ ] YES [ ] NO          A-LINE:  [ ] YES [ ] NO  LOCATION:             ICU Vital Signs Last 24 Hrs  T(C): 37.3 (05 Nov 2020 05:00), Max: 37.8 (05 Nov 2020 00:05)  T(F): 99.1 (05 Nov 2020 05:00), Max: 100 (05 Nov 2020 00:05)  HR: 86 (05 Nov 2020 07:15) (75 - 107)  BP: 114/78 (05 Nov 2020 07:15) (75/51 - 122/85)  BP(mean): 87 (05 Nov 2020 07:15) (55 - 93)  ABP: --  ABP(mean): --  RR: 27 (05 Nov 2020 07:15) (17 - 45)  SpO2: 100% (05 Nov 2020 07:15) (82% - 100%)      ABG - ( 03 Nov 2020 08:47 )  pH, Arterial: 7.33  pH, Blood: x     /  pCO2: 41    /  pO2: 123   / HCO3: 21    / Base Excess: -3.8  /  SaO2: 98                    11-04 @ 07:01  -  11-05 @ 07:00  --------------------------------------------------------  IN: 677.6 mL / OUT: 380 mL / NET: 297.6 mL        Mode: AC/ CMV (Assist Control/ Continuous Mandatory Ventilation)  RR (machine): 16  TV (machine): 375  FiO2: 50  PEEP: 5  ITime: 1  MAP: 12  PIP: 27      REVIEW OF SYSTEMS:    CONSTITUTIONAL: No weakness, fevers or chills  NECK: No pain or stiffness  RESPIRATORY: No cough, wheezing, hemoptysis; No shortness of breath  CARDIOVASCULAR: No chest pain or palpitations  GASTROINTESTINAL: No abdominal or epigastric pain. No nausea, vomiting, No diarrhea or constipation. No melena or hematochezia.  GENITOURINARY: No dysuria, frequency or hematuria  NEUROLOGICAL: No numbness or weakness  All other review of systems is negative unless indicated above      PHYSICAL EXAM:    GENERAL: NAD, well-groomed, well-developed  EYES: EOMI, PERRLA,   NECK: Supple, No JVD; Normal thyroid; Trachea midline  NERVOUS SYSTEM:  Alert & Oriented X3,  Motor Strength 5/5 B/L upper and lower extremities; DTRs 2+ intact and symmetric  CHEST/LUNG: No rales, rhonchi, wheezing   HEART: Regular rate and rhythm; No murmurs,   ABDOMEN: Soft, Nontender, Nondistended; Bowel sounds present  EXTREMITIES:  2+ Peripheral Pulses, No clubbing, cyanosis, or edema        LABS:  CBC Full  -  ( 05 Nov 2020 03:13 )  WBC Count : 9.84 K/uL  RBC Count : 2.39 M/uL  Hemoglobin : 7.4 g/dL  Hematocrit : 21.9 %  Platelet Count - Automated : 130 K/uL  Mean Cell Volume : 91.6 fl  Mean Cell Hemoglobin : 31.0 pg  Mean Cell Hemoglobin Concentration : 33.8 gm/dL  Auto Neutrophil # : 7.89 K/uL  Auto Lymphocyte # : 0.94 K/uL  Auto Monocyte # : 0.87 K/uL  Auto Eosinophil # : 0.01 K/uL  Auto Basophil # : 0.04 K/uL  Auto Neutrophil % : 80.2 %  Auto Lymphocyte % : 9.6 %  Auto Monocyte % : 8.8 %  Auto Eosinophil % : 0.1 %  Auto Basophil % : 0.4 %    11-05    137  |  104  |  36<H>  ----------------------------<  73  4.5   |  24  |  2.54<H>    Ca    8.2<L>      05 Nov 2020 03:13  Phos  3.9     11-05  Mg     2.1     11-05    TPro  5.5<L>  /  Alb  3.2<L>  /  TBili  8.6<H>  /  DBili  x   /  AST  228<H>  /  ALT  88<H>  /  AlkPhos  108  11-05    PT/INR - ( 04 Nov 2020 06:12 )   PT: 16.8 sec;   INR: 1.44 ratio         PTT - ( 04 Nov 2020 06:12 )  PTT:47.5 sec        RADIOLOGY & ADDITIONAL STUDIES REVIEWED:  ***    [ ]GOALS OF CARE DISCUSSION WITH PATIENT/FAMILY/PROXY:    CRITICAL CARE TIME SPENT: 35 minutes INTERVAL HPI/OVERNIGHT EVENTS:   pt failed SBt today    CXR no interval change     PRESSORS: [ ] YES [ ] NO  WHICH:    ANTIBIOTICS:                  DATE STARTED:  ANTIBIOTICS:                  DATE STARTED:  ANTIBIOTICS:                  DATE STARTED:    Antimicrobial:  levoFLOXacin IVPB 500 milliGRAM(s) IV Intermittent every 24 hours  rifAXIMin 550 milliGRAM(s) Oral two times a day    Cardiovascular:  midodrine. 10 milliGRAM(s) Oral three times a day  norepinephrine Infusion 0.05 MICROgram(s)/kG/Min IV Continuous <Continuous>    Pulmonary:    Hematalogic:  heparin   Injectable 5000 Unit(s) SubCutaneous every 12 hours    Other:  albumin human 25% IVPB 50 milliLiter(s) IV Intermittent every 6 hours  chlorhexidine 0.12% Liquid 15 milliLiter(s) Oral Mucosa every 12 hours  chlorhexidine 2% Cloths 1 Application(s) Topical daily  dexMEDEtomidine Infusion 0.2 MICROgram(s)/kG/Hr IV Continuous <Continuous>  nystatin Powder 1 Application(s) Topical two times a day  pantoprazole  Injectable 40 milliGRAM(s) IV Push two times a day  polyethylene glycol 3350 17 Gram(s) Oral daily  sodium chloride 0.9% lock flush 10 milliLiter(s) IV Push every 1 hour PRN    albumin human 25% IVPB 50 milliLiter(s) IV Intermittent every 6 hours  chlorhexidine 0.12% Liquid 15 milliLiter(s) Oral Mucosa every 12 hours  chlorhexidine 2% Cloths 1 Application(s) Topical daily  dexMEDEtomidine Infusion 0.2 MICROgram(s)/kG/Hr IV Continuous <Continuous>  heparin   Injectable 5000 Unit(s) SubCutaneous every 12 hours  levoFLOXacin IVPB 500 milliGRAM(s) IV Intermittent every 24 hours  midodrine. 10 milliGRAM(s) Oral three times a day  norepinephrine Infusion 0.05 MICROgram(s)/kG/Min IV Continuous <Continuous>  nystatin Powder 1 Application(s) Topical two times a day  pantoprazole  Injectable 40 milliGRAM(s) IV Push two times a day  polyethylene glycol 3350 17 Gram(s) Oral daily  rifAXIMin 550 milliGRAM(s) Oral two times a day  sodium chloride 0.9% lock flush 10 milliLiter(s) IV Push every 1 hour PRN    Drug Dosing Weight  Height (cm): 167.6 (21 Oct 2020 02:26)  Weight (kg): 56.2 (21 Oct 2020 02:26)  BMI (kg/m2): 20 (21 Oct 2020 02:26)  BSA (m2): 1.63 (21 Oct 2020 02:26)    CENTRAL LINE: [ ] YES [ ] NO  LOCATION:         TREJO: [ ] YES [ ] NO          A-LINE:  [ ] YES [ ] NO  LOCATION:             ICU Vital Signs Last 24 Hrs  T(C): 37.3 (05 Nov 2020 05:00), Max: 37.8 (05 Nov 2020 00:05)  T(F): 99.1 (05 Nov 2020 05:00), Max: 100 (05 Nov 2020 00:05)  HR: 86 (05 Nov 2020 07:15) (75 - 107)  BP: 114/78 (05 Nov 2020 07:15) (75/51 - 122/85)  BP(mean): 87 (05 Nov 2020 07:15) (55 - 93)  ABP: --  ABP(mean): --  RR: 27 (05 Nov 2020 07:15) (17 - 45)  SpO2: 100% (05 Nov 2020 07:15) (82% - 100%)      ABG - ( 03 Nov 2020 08:47 )  pH, Arterial: 7.33  pH, Blood: x     /  pCO2: 41    /  pO2: 123   / HCO3: 21    / Base Excess: -3.8  /  SaO2: 98                    11-04 @ 07:01  -  11-05 @ 07:00  --------------------------------------------------------  IN: 677.6 mL / OUT: 380 mL / NET: 297.6 mL        Mode: AC/ CMV (Assist Control/ Continuous Mandatory Ventilation)  RR (machine): 16  TV (machine): 375  FiO2: 50  PEEP: 5  ITime: 1  MAP: 12  PIP: 27      REVIEW OF SYSTEMS:    CONSTITUTIONAL: No weakness, fevers or chills  NECK: No pain or stiffness  RESPIRATORY: No cough, wheezing, hemoptysis; No shortness of breath  CARDIOVASCULAR: No chest pain or palpitations  GASTROINTESTINAL: No abdominal or epigastric pain. No nausea, vomiting, No diarrhea or constipation. No melena or hematochezia.  GENITOURINARY: No dysuria, frequency or hematuria  NEUROLOGICAL: No numbness or weakness  All other review of systems is negative unless indicated above      PHYSICAL EXAM:    GENERAL: NAD, well-groomed, well-developed  EYES: EOMI, PERRLA,   NECK: Supple, No JVD; Normal thyroid; Trachea midline  NERVOUS SYSTEM:  Alert & Oriented X3,  Motor Strength 5/5 B/L upper and lower extremities; DTRs 2+ intact and symmetric  CHEST/LUNG: No rales, rhonchi, wheezing   HEART: Regular rate and rhythm; No murmurs,   ABDOMEN: Soft, Nontender, Nondistended; Bowel sounds present  EXTREMITIES:  2+ Peripheral Pulses, No clubbing, cyanosis, or edema        LABS:  CBC Full  -  ( 05 Nov 2020 03:13 )  WBC Count : 9.84 K/uL  RBC Count : 2.39 M/uL  Hemoglobin : 7.4 g/dL  Hematocrit : 21.9 %  Platelet Count - Automated : 130 K/uL  Mean Cell Volume : 91.6 fl  Mean Cell Hemoglobin : 31.0 pg  Mean Cell Hemoglobin Concentration : 33.8 gm/dL  Auto Neutrophil # : 7.89 K/uL  Auto Lymphocyte # : 0.94 K/uL  Auto Monocyte # : 0.87 K/uL  Auto Eosinophil # : 0.01 K/uL  Auto Basophil # : 0.04 K/uL  Auto Neutrophil % : 80.2 %  Auto Lymphocyte % : 9.6 %  Auto Monocyte % : 8.8 %  Auto Eosinophil % : 0.1 %  Auto Basophil % : 0.4 %    11-05    137  |  104  |  36<H>  ----------------------------<  73  4.5   |  24  |  2.54<H>    Ca    8.2<L>      05 Nov 2020 03:13  Phos  3.9     11-05  Mg     2.1     11-05    TPro  5.5<L>  /  Alb  3.2<L>  /  TBili  8.6<H>  /  DBili  x   /  AST  228<H>  /  ALT  88<H>  /  AlkPhos  108  11-05    PT/INR - ( 04 Nov 2020 06:12 )   PT: 16.8 sec;   INR: 1.44 ratio         PTT - ( 04 Nov 2020 06:12 )  PTT:47.5 sec        RADIOLOGY & ADDITIONAL STUDIES REVIEWED:  ***    [ ]GOALS OF CARE DISCUSSION WITH PATIENT/FAMILY/PROXY:    CRITICAL CARE TIME SPENT: 35 minutes INTERVAL HPI/OVERNIGHT EVENTS:   pt failed SBt today, pt tachypneic, mental status improved, follows commands    CXR no interval change    will c/w levophed and precedex as needed    vanc trough elevated , vancomycin dc'd , will check vanc through daily   u/o improved to 20cc/hr     PRESSORS: [ ] YES [ ] NO  WHICH:    ANTIBIOTICS:                  DATE STARTED:  ANTIBIOTICS:                  DATE STARTED:  ANTIBIOTICS:                  DATE STARTED:    Antimicrobial:  levoFLOXacin IVPB 500 milliGRAM(s) IV Intermittent every 24 hours  rifAXIMin 550 milliGRAM(s) Oral two times a day    Cardiovascular:  midodrine. 10 milliGRAM(s) Oral three times a day  norepinephrine Infusion 0.05 MICROgram(s)/kG/Min IV Continuous <Continuous>    Pulmonary:    Hematalogic:  heparin   Injectable 5000 Unit(s) SubCutaneous every 12 hours    Other:  albumin human 25% IVPB 50 milliLiter(s) IV Intermittent every 6 hours  chlorhexidine 0.12% Liquid 15 milliLiter(s) Oral Mucosa every 12 hours  chlorhexidine 2% Cloths 1 Application(s) Topical daily  dexMEDEtomidine Infusion 0.2 MICROgram(s)/kG/Hr IV Continuous <Continuous>  nystatin Powder 1 Application(s) Topical two times a day  pantoprazole  Injectable 40 milliGRAM(s) IV Push two times a day  polyethylene glycol 3350 17 Gram(s) Oral daily  sodium chloride 0.9% lock flush 10 milliLiter(s) IV Push every 1 hour PRN    albumin human 25% IVPB 50 milliLiter(s) IV Intermittent every 6 hours  chlorhexidine 0.12% Liquid 15 milliLiter(s) Oral Mucosa every 12 hours  chlorhexidine 2% Cloths 1 Application(s) Topical daily  dexMEDEtomidine Infusion 0.2 MICROgram(s)/kG/Hr IV Continuous <Continuous>  heparin   Injectable 5000 Unit(s) SubCutaneous every 12 hours  levoFLOXacin IVPB 500 milliGRAM(s) IV Intermittent every 24 hours  midodrine. 10 milliGRAM(s) Oral three times a day  norepinephrine Infusion 0.05 MICROgram(s)/kG/Min IV Continuous <Continuous>  nystatin Powder 1 Application(s) Topical two times a day  pantoprazole  Injectable 40 milliGRAM(s) IV Push two times a day  polyethylene glycol 3350 17 Gram(s) Oral daily  rifAXIMin 550 milliGRAM(s) Oral two times a day  sodium chloride 0.9% lock flush 10 milliLiter(s) IV Push every 1 hour PRN    Drug Dosing Weight  Height (cm): 167.6 (21 Oct 2020 02:26)  Weight (kg): 56.2 (21 Oct 2020 02:26)  BMI (kg/m2): 20 (21 Oct 2020 02:26)  BSA (m2): 1.63 (21 Oct 2020 02:26)    CENTRAL LINE: [ ] YES [ ] NO  LOCATION:         TREJO: [ ] YES [ ] NO          A-LINE:  [ ] YES [ ] NO  LOCATION:             ICU Vital Signs Last 24 Hrs  T(C): 37.3 (05 Nov 2020 05:00), Max: 37.8 (05 Nov 2020 00:05)  T(F): 99.1 (05 Nov 2020 05:00), Max: 100 (05 Nov 2020 00:05)  HR: 86 (05 Nov 2020 07:15) (75 - 107)  BP: 114/78 (05 Nov 2020 07:15) (75/51 - 122/85)  BP(mean): 87 (05 Nov 2020 07:15) (55 - 93)  ABP: --  ABP(mean): --  RR: 27 (05 Nov 2020 07:15) (17 - 45)  SpO2: 100% (05 Nov 2020 07:15) (82% - 100%)      ABG - ( 03 Nov 2020 08:47 )  pH, Arterial: 7.33  pH, Blood: x     /  pCO2: 41    /  pO2: 123   / HCO3: 21    / Base Excess: -3.8  /  SaO2: 98                    11-04 @ 07:01  -  11-05 @ 07:00  --------------------------------------------------------  IN: 677.6 mL / OUT: 380 mL / NET: 297.6 mL        Mode: AC/ CMV (Assist Control/ Continuous Mandatory Ventilation)  RR (machine): 16  TV (machine): 375  FiO2: 50  PEEP: 5  ITime: 1  MAP: 12  PIP: 27      PHYSICAL EXAM:    GENERAL: Intubated and sedated + ETT  HEAD:  Atraumatic, Normocephalic  EYES: b/l pupil reactive to light.   NECK: Supple, normal appearance, No JVD; Normal thyroid; Trachea midline  NERVOUS SYSTEM:  Alert & Oriented X0  CHEST/LUNG: equal b/l air entry and rhonchi on auscultation  HEART: Regular rate and rhythm; No murmurs, rubs, or gallops  ABDOMEN: Soft, Nontender, mildly distended; Bowel sounds present  EXTREMITIES:  2+ Peripheral Pulses, No clubbing, cyanosis, or edema  LYMPH: No lymphadenopathy noted  SKIN: No rashes or lesions; Good capillary refill        LABS:  CBC Full  -  ( 05 Nov 2020 03:13 )  WBC Count : 9.84 K/uL  RBC Count : 2.39 M/uL  Hemoglobin : 7.4 g/dL  Hematocrit : 21.9 %  Platelet Count - Automated : 130 K/uL  Mean Cell Volume : 91.6 fl  Mean Cell Hemoglobin : 31.0 pg  Mean Cell Hemoglobin Concentration : 33.8 gm/dL  Auto Neutrophil # : 7.89 K/uL  Auto Lymphocyte # : 0.94 K/uL  Auto Monocyte # : 0.87 K/uL  Auto Eosinophil # : 0.01 K/uL  Auto Basophil # : 0.04 K/uL  Auto Neutrophil % : 80.2 %  Auto Lymphocyte % : 9.6 %  Auto Monocyte % : 8.8 %  Auto Eosinophil % : 0.1 %  Auto Basophil % : 0.4 %    11-05    137  |  104  |  36<H>  ----------------------------<  73  4.5   |  24  |  2.54<H>    Ca    8.2<L>      05 Nov 2020 03:13  Phos  3.9     11-05  Mg     2.1     11-05    TPro  5.5<L>  /  Alb  3.2<L>  /  TBili  8.6<H>  /  DBili  x   /  AST  228<H>  /  ALT  88<H>  /  AlkPhos  108  11-05    PT/INR - ( 04 Nov 2020 06:12 )   PT: 16.8 sec;   INR: 1.44 ratio         PTT - ( 04 Nov 2020 06:12 )  PTT:47.5 sec        RADIOLOGY & ADDITIONAL STUDIES REVIEWED:  ***    [ ]GOALS OF CARE DISCUSSION WITH PATIENT/FAMILY/PROXY:    CRITICAL CARE TIME SPENT: 35 minutes

## 2020-11-05 NOTE — PROGRESS NOTE ADULT - SUBJECTIVE AND OBJECTIVE BOX
OVERNIGHT EVENTS:    Present Symptoms:   Dyspnea:   Nausea/Vomiting:   Anxiety:    Depressed Mood:   Fatigue:   Loss of appetite:   Pain:                   location:   Review of Systems: [All others negative or Unable to obtain due to poor mentation]    MEDICATIONS  (STANDING):  albumin human 25% IVPB 50 milliLiter(s) IV Intermittent every 6 hours  chlorhexidine 0.12% Liquid 15 milliLiter(s) Oral Mucosa every 12 hours  chlorhexidine 2% Cloths 1 Application(s) Topical daily  dexMEDEtomidine Infusion 0.2 MICROgram(s)/kG/Hr (2.81 mL/Hr) IV Continuous <Continuous>  heparin   Injectable 5000 Unit(s) SubCutaneous every 12 hours  lactulose Syrup 10 Gram(s) Oral daily  levoFLOXacin IVPB 500 milliGRAM(s) IV Intermittent every 24 hours  midodrine. 10 milliGRAM(s) Oral three times a day  norepinephrine Infusion 0.05 MICROgram(s)/kG/Min (2.63 mL/Hr) IV Continuous <Continuous>  nystatin Powder 1 Application(s) Topical two times a day  pantoprazole  Injectable 40 milliGRAM(s) IV Push two times a day  rifAXIMin 550 milliGRAM(s) Oral two times a day    MEDICATIONS  (PRN):  sodium chloride 0.9% lock flush 10 milliLiter(s) IV Push every 1 hour PRN Pre/post blood products, medications, blood draw, and to maintain line patency      PHYSICAL EXAM:  Vital Signs Last 24 Hrs  T(C): 37.7 (05 Nov 2020 09:00), Max: 37.8 (05 Nov 2020 00:05)  T(F): 99.9 (05 Nov 2020 09:00), Max: 100 (05 Nov 2020 00:05)  HR: 91 (05 Nov 2020 14:00) (75 - 107)  BP: 116/73 (05 Nov 2020 14:00) (75/51 - 122/85)  BP(mean): 83 (05 Nov 2020 14:00) (55 - 93)  RR: 29 (05 Nov 2020 14:00) (17 - 41)  SpO2: 100% (05 Nov 2020 14:00) (82% - 100%)  General: alert  oriented x ____    [lethargic distressed cachexia  nonverbal  unarousable verbal]  Karnofsky Performance Score/Palliative Performance Status Version2:    %    HEENT: normal  dry mouth  ET tube/trach oral lesions:  Lungs: comfortable tachypnea/labored breathing  excessive secretions  CV: normal  tachycardia  GI: normal  distended  tender  incontinent               PEG/NG/OG tube  constipation  last BM:   : normal  incontinent  oliguria/anuria  velazquez  Musculoskeletal: normal  weakness  edema             ambulatory  bedbound/wheelchair bound  Skin: normal  pressure ulcers: stage: edema: other:  Neuro: no deficits cognitive impairment dsyphagia/dysarthria paresis: other:  Oral intake ability: unable/only mouth care [minimal moderate full capability]  Diet: NPO,     LABS:                          7.4    9.84  )-----------( 130      ( 05 Nov 2020 03:13 )             21.9     11-05    137  |  104  |  36<H>  ----------------------------<  73  4.5   |  24  |  2.54<H>    Ca    8.2<L>      05 Nov 2020 03:13  Phos  3.9     11-05  Mg     2.1     11-05    TPro  5.5<L>  /  Alb  3.2<L>  /  TBili  8.6<H>  /  DBili  x   /  AST  228<H>  /  ALT  88<H>  /  AlkPhos  108  11-05        RADIOLOGY & ADDITIONAL STUDIES:    ADVANCE DIRECTIVES:  Advanced Care Planning discussion total time spent:   OVERNIGHT EVENTS: failed SBT today    Present Symptoms:   Review of Systems: Unable to obtain due to poor mentation    MEDICATIONS  (STANDING):  albumin human 25% IVPB 50 milliLiter(s) IV Intermittent every 6 hours  chlorhexidine 0.12% Liquid 15 milliLiter(s) Oral Mucosa every 12 hours  chlorhexidine 2% Cloths 1 Application(s) Topical daily  dexMEDEtomidine Infusion 0.2 MICROgram(s)/kG/Hr (2.81 mL/Hr) IV Continuous <Continuous>  heparin   Injectable 5000 Unit(s) SubCutaneous every 12 hours  lactulose Syrup 10 Gram(s) Oral daily  levoFLOXacin IVPB 500 milliGRAM(s) IV Intermittent every 24 hours  midodrine. 10 milliGRAM(s) Oral three times a day  norepinephrine Infusion 0.05 MICROgram(s)/kG/Min (2.63 mL/Hr) IV Continuous <Continuous>  nystatin Powder 1 Application(s) Topical two times a day  pantoprazole  Injectable 40 milliGRAM(s) IV Push two times a day  rifAXIMin 550 milliGRAM(s) Oral two times a day    MEDICATIONS  (PRN):  sodium chloride 0.9% lock flush 10 milliLiter(s) IV Push every 1 hour PRN Pre/post blood products, medications, blood draw, and to maintain line patency      PHYSICAL EXAM:  Vital Signs Last 24 Hrs  T(C): 37.7 (05 Nov 2020 09:00), Max: 37.8 (05 Nov 2020 00:05)  T(F): 99.9 (05 Nov 2020 09:00), Max: 100 (05 Nov 2020 00:05)  HR: 91 (05 Nov 2020 14:00) (75 - 107)  BP: 116/73 (05 Nov 2020 14:00) (75/51 - 122/85)  BP(mean): 83 (05 Nov 2020 14:00) (55 - 93)  RR: 29 (05 Nov 2020 14:00) (17 - 41)  SpO2: 100% (05 Nov 2020 14:00) (82% - 100%)  General: cachectic, opens eyes, follows very simple commands, intubated, on pressor, ill appearing, NAD  Karnofsky Performance Score/Palliative Performance Status Version2:    20%    HEENT: +temporal wasting  Lungs:   tachypnea, on ventilator. Continues precedex    CV:S1S2, + tachycardia. On pressor and midodrine  GI: incontinent, NGT  :   velazquez  Musculoskeletal: +generalized weakness, dependent on ADLs  Skin: +anasarca  Neuro: patient sedated/intubated but opens eyes, follows simple commands  Oral intake ability: unable/only mouth care    Diet: NPO, TF via NGT    LABS:                          7.4    9.84  )-----------( 130      ( 05 Nov 2020 03:13 )             21.9     11-05    137  |  104  |  36<H>  ----------------------------<  73  4.5   |  24  |  2.54<H>    Ca    8.2<L>      05 Nov 2020 03:13  Phos  3.9     11-05  Mg     2.1     11-05    TPro  5.5<L>  /  Alb  3.2<L>  /  TBili  8.6<H>  /  DBili  x   /  AST  228<H>  /  ALT  88<H>  /  AlkPhos  108  11-05        RADIOLOGY & ADDITIONAL STUDIES:  < from: Xray Chest 1 View- PORTABLE-Routine (Xray Chest 1 View- PORTABLE-Routine in AM.) (11.05.20 @ 08:12) >  EXAM:  XR CHEST PORTABLE ROUTINE 1V                            PROCEDURE DATE:  11/05/2020          INTERPRETATION:  CLINICAL INDICATION: 65 years  Female with ICU f/u.    COMPARISON: 11/4/2020    The tip of the ET tube is in the mid trachea. The right jugular catheter tip overlies the superior vena cava. The tip of the enteric catheter is below the diaphragm.    Bilateral interstitial and airspace infiltrates are reidentified, stable on the left and worsening on the right. A small left pleuraleffusion and trace right pleural effusion are unchanged.    The heart is normal in size. There is no mediastinal or hilar mass.    Mild thoracic degenerative changes are present. And osteopenia.    IMPRESSION:    Bilateral interstitial and airspace infiltrates on the basis of pulmonary edema or pneumonia. Worsening on the right. Stable on the left.    Stable pleural effusions.    Tubes and catheters in satisfactory position.    < end of copied text >      ADVANCE DIRECTIVES: MOLST: DNR

## 2020-11-06 NOTE — PROGRESS NOTE ADULT - ASSESSMENT
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  BC grew bacteroides.    Problem/Recommendation - 1:  Problem: Bacteremia. Recommendation: bacteroides  most likely GI origin  on antibiotics  she had hypothermia and hypotension and elevated lactic acid  in ICU now  multiorgan failure but improving  christel is trending up again, cholestasis, etiology?  Cr continue to trend up may be due to poor perfusion  Problem/Recommendation - 2:·  Problem: Anemia.  Recommendation: ferritin, b12 folate normalno hemolysis  most likley due to malnutrition from short bowel syndrome.she also had GIB multiple times before. Problem/Recommendation - 3:·  Problem: Acute deep vein thrombosis (DVT) of other vein of upper extremity.  Recommendation: she has been on xarelto for 2 years.  DVT at left arm and left leg before.  in all CT scan i did not see report of IVC or hepatic vein thrombosis  off xarelto and lovenox due to elevated PT/PTT4. elevated PT/PTT due to malnutrition and antibiotics causing VITK defnow it is mostly recovered  it begins to rise again, correctable by mixing study  probably due to liver failure this time  she develops DVT very quickly while off AC  need DVT prophylaxis, sq heparin  5. thrombocytopenia  new onset, most likely from sepsis or medication, procal is rising  the other possibilties causing multiorgan failure, including thrombocytopenia,  such as autoimmune, vasculitis, HLH, catastrophic APS(less likely)  platelet is trending up, 150 today  6. CHF, new onset global hypokinesia,   cardiomyopathy, ?etiology  will give thiamin  7. infiltrates, CHF vs infection  check COVID again  infiltrates seems better now

## 2020-11-06 NOTE — PROGRESS NOTE ADULT - SUBJECTIVE AND OBJECTIVE BOX
INTERVAL HPI/OVERNIGHT EVENTS: ***    PRESSORS: [ ] YES [ ] NO  WHICH:    ANTIBIOTICS:                  DATE STARTED:  ANTIBIOTICS:                  DATE STARTED:  ANTIBIOTICS:                  DATE STARTED:    Antimicrobial:  levoFLOXacin IVPB 500 milliGRAM(s) IV Intermittent every 24 hours  rifAXIMin 550 milliGRAM(s) Oral two times a day    Cardiovascular:  midodrine. 10 milliGRAM(s) Oral three times a day  norepinephrine Infusion 0.05 MICROgram(s)/kG/Min IV Continuous <Continuous>    Pulmonary:    Hematalogic:  heparin   Injectable 5000 Unit(s) SubCutaneous every 12 hours    Other:  chlorhexidine 0.12% Liquid 15 milliLiter(s) Oral Mucosa every 12 hours  chlorhexidine 2% Cloths 1 Application(s) Topical daily  dexMEDEtomidine Infusion 0.2 MICROgram(s)/kG/Hr IV Continuous <Continuous>  lactulose Syrup 10 Gram(s) Oral daily  nystatin Powder 1 Application(s) Topical two times a day  pantoprazole  Injectable 40 milliGRAM(s) IV Push two times a day  sodium chloride 0.9% lock flush 10 milliLiter(s) IV Push every 1 hour PRN    chlorhexidine 0.12% Liquid 15 milliLiter(s) Oral Mucosa every 12 hours  chlorhexidine 2% Cloths 1 Application(s) Topical daily  dexMEDEtomidine Infusion 0.2 MICROgram(s)/kG/Hr IV Continuous <Continuous>  heparin   Injectable 5000 Unit(s) SubCutaneous every 12 hours  lactulose Syrup 10 Gram(s) Oral daily  levoFLOXacin IVPB 500 milliGRAM(s) IV Intermittent every 24 hours  midodrine. 10 milliGRAM(s) Oral three times a day  norepinephrine Infusion 0.05 MICROgram(s)/kG/Min IV Continuous <Continuous>  nystatin Powder 1 Application(s) Topical two times a day  pantoprazole  Injectable 40 milliGRAM(s) IV Push two times a day  rifAXIMin 550 milliGRAM(s) Oral two times a day  sodium chloride 0.9% lock flush 10 milliLiter(s) IV Push every 1 hour PRN    Drug Dosing Weight  Height (cm): 167.6 (21 Oct 2020 02:26)  Weight (kg): 56.2 (21 Oct 2020 02:26)  BMI (kg/m2): 20 (21 Oct 2020 02:26)  BSA (m2): 1.63 (21 Oct 2020 02:26)    CENTRAL LINE: [ ] YES [ ] NO  LOCATION:         TREJO: [ ] YES [ ] NO          A-LINE:  [ ] YES [ ] NO  LOCATION:             ICU Vital Signs Last 24 Hrs  T(C): 36.7 (06 Nov 2020 06:00), Max: 37.7 (05 Nov 2020 09:00)  T(F): 98.1 (06 Nov 2020 06:00), Max: 99.9 (05 Nov 2020 09:00)  HR: 87 (06 Nov 2020 06:45) (85 - 105)  BP: 116/76 (06 Nov 2020 06:45) (91/47 - 123/79)  BP(mean): 86 (06 Nov 2020 06:45) (58 - 99)  ABP: --  ABP(mean): --  RR: 22 (06 Nov 2020 06:45) (16 - 36)  SpO2: 99% (06 Nov 2020 06:45) (98% - 100%)      ABG - ( 06 Nov 2020 04:23 )  pH, Arterial: 7.40  pH, Blood: x     /  pCO2: 38    /  pO2: 132   / HCO3: 23    / Base Excess: -0.9  /  SaO2: 99                    11-05 @ 07:01  -  11-06 @ 07:00  --------------------------------------------------------  IN: 695 mL / OUT: 2510 mL / NET: -1815 mL        Mode: AC/ CMV (Assist Control/ Continuous Mandatory Ventilation)  RR (machine): 16  TV (machine): 375  FiO2: 40  PEEP: 5  ITime: 1  MAP: 11  PIP: 25      REVIEW OF SYSTEMS:    CONSTITUTIONAL: No weakness, fevers or chills  NECK: No pain or stiffness  RESPIRATORY: No cough, wheezing, hemoptysis; No shortness of breath  CARDIOVASCULAR: No chest pain or palpitations  GASTROINTESTINAL: No abdominal or epigastric pain. No nausea, vomiting, No diarrhea or constipation. No melena or hematochezia.  GENITOURINARY: No dysuria, frequency or hematuria  NEUROLOGICAL: No numbness or weakness  All other review of systems is negative unless indicated above      PHYSICAL EXAM:    GENERAL: NAD, well-groomed, well-developed  EYES: EOMI, PERRLA,   NECK: Supple, No JVD; Normal thyroid; Trachea midline  NERVOUS SYSTEM:  Alert & Oriented X3,  Motor Strength 5/5 B/L upper and lower extremities; DTRs 2+ intact and symmetric  CHEST/LUNG: No rales, rhonchi, wheezing   HEART: Regular rate and rhythm; No murmurs,   ABDOMEN: Soft, Nontender, Nondistended; Bowel sounds present  EXTREMITIES:  2+ Peripheral Pulses, No clubbing, cyanosis, or edema        LABS:  CBC Full  -  ( 06 Nov 2020 06:20 )  WBC Count : 8.70 K/uL  RBC Count : 2.44 M/uL  Hemoglobin : 7.6 g/dL  Hematocrit : 21.9 %  Platelet Count - Automated : 152 K/uL  Mean Cell Volume : 89.8 fl  Mean Cell Hemoglobin : 31.1 pg  Mean Cell Hemoglobin Concentration : 34.7 gm/dL  Auto Neutrophil # : 6.93 K/uL  Auto Lymphocyte # : 0.64 K/uL  Auto Monocyte # : 0.96 K/uL  Auto Eosinophil # : 0.07 K/uL  Auto Basophil # : 0.04 K/uL  Auto Neutrophil % : 79.6 %  Auto Lymphocyte % : 7.4 %  Auto Monocyte % : 11.0 %  Auto Eosinophil % : 0.8 %  Auto Basophil % : 0.5 %    11-06    138  |  104  |  37<H>  ----------------------------<  81  3.9   |  25  |  2.37<H>    Ca    8.1<L>      06 Nov 2020 06:20  Phos  3.4     11-06  Mg     2.0     11-06    TPro  5.1<L>  /  Alb  2.8<L>  /  TBili  9.2<H>  /  DBili  x   /  AST  233<H>  /  ALT  99<H>  /  AlkPhos  96  11-06            RADIOLOGY & ADDITIONAL STUDIES REVIEWED:  ***    [ ]GOALS OF CARE DISCUSSION WITH PATIENT/FAMILY/PROXY:    CRITICAL CARE TIME SPENT: 35 minutes INTERVAL HPI/OVERNIGHT EVENTS:   No acute event overnight   UO improved after high dose lasix (120mg once ) trial     PRESSORS: [ ] YES [ ] NO  WHICH:    ANTIBIOTICS:                  DATE STARTED:  ANTIBIOTICS:                  DATE STARTED:  ANTIBIOTICS:                  DATE STARTED:    Antimicrobial:  levoFLOXacin IVPB 500 milliGRAM(s) IV Intermittent every 24 hours  rifAXIMin 550 milliGRAM(s) Oral two times a day    Cardiovascular:  midodrine. 10 milliGRAM(s) Oral three times a day  norepinephrine Infusion 0.05 MICROgram(s)/kG/Min IV Continuous <Continuous>    Pulmonary:    Hematalogic:  heparin   Injectable 5000 Unit(s) SubCutaneous every 12 hours    Other:  chlorhexidine 0.12% Liquid 15 milliLiter(s) Oral Mucosa every 12 hours  chlorhexidine 2% Cloths 1 Application(s) Topical daily  dexMEDEtomidine Infusion 0.2 MICROgram(s)/kG/Hr IV Continuous <Continuous>  lactulose Syrup 10 Gram(s) Oral daily  nystatin Powder 1 Application(s) Topical two times a day  pantoprazole  Injectable 40 milliGRAM(s) IV Push two times a day  sodium chloride 0.9% lock flush 10 milliLiter(s) IV Push every 1 hour PRN    chlorhexidine 0.12% Liquid 15 milliLiter(s) Oral Mucosa every 12 hours  chlorhexidine 2% Cloths 1 Application(s) Topical daily  dexMEDEtomidine Infusion 0.2 MICROgram(s)/kG/Hr IV Continuous <Continuous>  heparin   Injectable 5000 Unit(s) SubCutaneous every 12 hours  lactulose Syrup 10 Gram(s) Oral daily  levoFLOXacin IVPB 500 milliGRAM(s) IV Intermittent every 24 hours  midodrine. 10 milliGRAM(s) Oral three times a day  norepinephrine Infusion 0.05 MICROgram(s)/kG/Min IV Continuous <Continuous>  nystatin Powder 1 Application(s) Topical two times a day  pantoprazole  Injectable 40 milliGRAM(s) IV Push two times a day  rifAXIMin 550 milliGRAM(s) Oral two times a day  sodium chloride 0.9% lock flush 10 milliLiter(s) IV Push every 1 hour PRN    Drug Dosing Weight  Height (cm): 167.6 (21 Oct 2020 02:26)  Weight (kg): 56.2 (21 Oct 2020 02:26)  BMI (kg/m2): 20 (21 Oct 2020 02:26)  BSA (m2): 1.63 (21 Oct 2020 02:26)    CENTRAL LINE: [ ] YES [ ] NO  LOCATION:         TREJO: [ ] YES [ ] NO          A-LINE:  [ ] YES [ ] NO  LOCATION:             ICU Vital Signs Last 24 Hrs  T(C): 36.7 (06 Nov 2020 06:00), Max: 37.7 (05 Nov 2020 09:00)  T(F): 98.1 (06 Nov 2020 06:00), Max: 99.9 (05 Nov 2020 09:00)  HR: 87 (06 Nov 2020 06:45) (85 - 105)  BP: 116/76 (06 Nov 2020 06:45) (91/47 - 123/79)  BP(mean): 86 (06 Nov 2020 06:45) (58 - 99)  ABP: --  ABP(mean): --  RR: 22 (06 Nov 2020 06:45) (16 - 36)  SpO2: 99% (06 Nov 2020 06:45) (98% - 100%)      ABG - ( 06 Nov 2020 04:23 )  pH, Arterial: 7.40  pH, Blood: x     /  pCO2: 38    /  pO2: 132   / HCO3: 23    / Base Excess: -0.9  /  SaO2: 99                    11-05 @ 07:01  -  11-06 @ 07:00  --------------------------------------------------------  IN: 695 mL / OUT: 2510 mL / NET: -1815 mL        Mode: AC/ CMV (Assist Control/ Continuous Mandatory Ventilation)  RR (machine): 16  TV (machine): 375  FiO2: 40  PEEP: 5  ITime: 1  MAP: 11  PIP: 25      PHYSICAL EXAM:    GENERAL: Intubated and sedated + ETT  HEAD:  Atraumatic, Normocephalic  EYES: b/l pupil reactive to light.   NECK: Supple, normal appearance, No JVD; Normal thyroid; Trachea midline  NERVOUS SYSTEM:  Alert & Oriented X0  CHEST/LUNG: equal b/l air entry and rhonchi on auscultation  HEART: Regular rate and rhythm; No murmurs, rubs, or gallops  ABDOMEN: Soft, Nontender, mildly distended; Bowel sounds present  EXTREMITIES:  2+ Peripheral Pulses, No clubbing, cyanosis, or edema  LYMPH: No lymphadenopathy noted  SKIN: No rashes or lesions; Good capillary refill        LABS:  CBC Full  -  ( 06 Nov 2020 06:20 )  WBC Count : 8.70 K/uL  RBC Count : 2.44 M/uL  Hemoglobin : 7.6 g/dL  Hematocrit : 21.9 %  Platelet Count - Automated : 152 K/uL  Mean Cell Volume : 89.8 fl  Mean Cell Hemoglobin : 31.1 pg  Mean Cell Hemoglobin Concentration : 34.7 gm/dL  Auto Neutrophil # : 6.93 K/uL  Auto Lymphocyte # : 0.64 K/uL  Auto Monocyte # : 0.96 K/uL  Auto Eosinophil # : 0.07 K/uL  Auto Basophil # : 0.04 K/uL  Auto Neutrophil % : 79.6 %  Auto Lymphocyte % : 7.4 %  Auto Monocyte % : 11.0 %  Auto Eosinophil % : 0.8 %  Auto Basophil % : 0.5 %    11-06    138  |  104  |  37<H>  ----------------------------<  81  3.9   |  25  |  2.37<H>    Ca    8.1<L>      06 Nov 2020 06:20  Phos  3.4     11-06  Mg     2.0     11-06    TPro  5.1<L>  /  Alb  2.8<L>  /  TBili  9.2<H>  /  DBili  x   /  AST  233<H>  /  ALT  99<H>  /  AlkPhos  96  11-06            RADIOLOGY & ADDITIONAL STUDIES REVIEWED:  ***    [ ]GOALS OF CARE DISCUSSION WITH PATIENT/FAMILY/PROXY:    CRITICAL CARE TIME SPENT: 35 minutes

## 2020-11-06 NOTE — PROGRESS NOTE ADULT - SUBJECTIVE AND OBJECTIVE BOX
Patient was seen and examined  Patient is a 65y old  Female who presents with a chief complaint of Hypotension (06 Nov 2020 07:30)      INTERVAL HPI/OVERNIGHT EVENTS:  T(C): 36.7 (11-06-20 @ 06:00), Max: 37.7 (11-05-20 @ 09:00)  HR: 85 (11-06-20 @ 08:34) (85 - 105)  BP: 109/73 (11-06-20 @ 07:45) (91/47 - 123/79)  RR: 23 (11-06-20 @ 07:45) (16 - 36)  SpO2: 100% (11-06-20 @ 08:34) (98% - 100%)  Wt(kg): --  I&O's Summary    05 Nov 2020 07:01  -  06 Nov 2020 07:00  --------------------------------------------------------  IN: 695 mL / OUT: 2510 mL / NET: -1815 mL        LABS:                        7.6    8.70  )-----------( 152      ( 06 Nov 2020 06:20 )             21.9     11-06    138  |  104  |  37<H>  ----------------------------<  81  3.9   |  25  |  2.37<H>    Ca    8.1<L>      06 Nov 2020 06:20  Phos  3.4     11-06  Mg     2.0     11-06    TPro  5.1<L>  /  Alb  2.8<L>  /  TBili  9.2<H>  /  DBili  x   /  AST  233<H>  /  ALT  99<H>  /  AlkPhos  96  11-06        CAPILLARY BLOOD GLUCOSE      POCT Blood Glucose.: 84 mg/dL (06 Nov 2020 06:09)  POCT Blood Glucose.: 73 mg/dL (06 Nov 2020 00:06)  POCT Blood Glucose.: 95 mg/dL (05 Nov 2020 19:08)  POCT Blood Glucose.: 79 mg/dL (05 Nov 2020 13:53)        ABG - ( 06 Nov 2020 04:23 )  pH, Arterial: 7.40  pH, Blood: x     /  pCO2: 38    /  pO2: 132   / HCO3: 23    / Base Excess: -0.9  /  SaO2: 99                    MEDICATIONS  (STANDING):  chlorhexidine 0.12% Liquid 15 milliLiter(s) Oral Mucosa every 12 hours  chlorhexidine 2% Cloths 1 Application(s) Topical daily  dexMEDEtomidine Infusion 0.2 MICROgram(s)/kG/Hr (2.81 mL/Hr) IV Continuous <Continuous>  heparin   Injectable 5000 Unit(s) SubCutaneous every 12 hours  lactulose Syrup 10 Gram(s) Oral daily  levoFLOXacin IVPB 500 milliGRAM(s) IV Intermittent every 24 hours  midodrine. 10 milliGRAM(s) Oral three times a day  norepinephrine Infusion 0.05 MICROgram(s)/kG/Min (2.63 mL/Hr) IV Continuous <Continuous>  nystatin Powder 1 Application(s) Topical two times a day  pantoprazole  Injectable 40 milliGRAM(s) IV Push two times a day  rifAXIMin 550 milliGRAM(s) Oral two times a day    MEDICATIONS  (PRN):  sodium chloride 0.9% lock flush 10 milliLiter(s) IV Push every 1 hour PRN Pre/post blood products, medications, blood draw, and to maintain line patency      RADIOLOGY & ADDITIONAL TESTS:    Imaging Personally Reviewed:  [ ] YES  [ ] NO        Consultant(s) Notes Reviewed:  [ x ] YES  [ ] NO    PHYSICAL EXAM:  GENERAL: NAD, well-groomed, well-developed  HEAD:  Atraumatic, Normocephalic  EYES: EOMI, PERRLA, conjunctiva and sclera clear  ENMT: No tonsillar erythema, exudates, or enlargement; Moist mucous membranes, Good dentition, No lesions  NECK: Supple, No JVD, Normal thyroid  NERVOUS SYSTEM:  Alert & Oriented X3, Good concentration; Motor Strength 5/5 B/L upper and lower extremities; DTRs 2+ intact and symmetric  CHEST/LUNG: Clear to percussion bilaterally; No rales, rhonchi, wheezing, or rubs  HEART: Regular rate and rhythm; No murmurs, rubs, or gallops  ABDOMEN: Soft, Nontender, Nondistended; Bowel sounds present  EXTREMITIES:  2+ Peripheral Pulses, No clubbing, cyanosis, or edema  LYMPH: No lymphadenopathy noted  SKIN: No rashes or lesions    Care Discussed with Consultants/Other Providers [ x] YES  [ ] NO

## 2020-11-06 NOTE — PROGRESS NOTE ADULT - ASSESSMENT
as per icu     65y Female from home, lives with daughter, ambulates with walker with PMH of recurrent SBO's, s/p exp lap, SB resection in 2015, ex lap, ALBINA in 2018, DVT, PE, on Xarelto, IVC filter, chronic leg swelling, and anasarca, came in to the ED due to hypotension during office visit. Patient was found to be bacteremic with bacteroids fragilis. Admitted in ICU due to hypotension and hypothermia. On vancomycin and levo . BCx X2 negative. Sputum positive for MRSA and Stenotrophomonas.     Diagnosis:  >Encephalopathy  >Sepsis  >Bacteremia  >Anemia  >DVT/PE  >Transaminitis  >Aspiration pneumonia    --------------------------------------------Neuro--------------------------------------  -She is AAOx2 at baseline on evaluation  -Encephalopathic and got intubated on 10/24  -Ammonia trending down from 152 > 130 > 116 >103>131>126>90>77  -On lactulose 30g q 8h and rifaximin, goal BM 2-3   -INR>1.63>1.71>1.91>1.98> 1.44>  -Pt with multiorgan failure secondary to septic shock  -Hold CT head for now as AMS was most likely due to hepatic encephalopathy and pt didn't have any focal neurological deficit when she started having AMS  -her pupils are reactive to light b/l, patient can follow commands   - started on small dose of Precedex for tachypnea      -------------------------------------CVS-------------------------------  -Patient is hypotensive secondary septic shock  - patients started on levophed   -On abx Levo renally dosed , Vancomycin stopped for high vanc trough ,  -NG tube feeding  -Midodrine 10 mg TID  -RIJ for pressors  -Lasix PRN for improving UO  -Pt is getting lasix and albumin on and off   -previous ECHO 3-4 months back showed normal EF but new ECHO showed EF 15-20% with severe left ventricular dysfunction  -pt with multiorgan dysfunction sec to septic shock , mixed venous gas from [facility] showed borderline low O2 saturation , mixed picture of septic /cardiogenic shock   -f/u on autoimmune workup KATHY, anticardiolipin antibodies IgG IgM , ANCA  -Anticardiolipin abs +ve      --------------------------------Respiratory----------------------  - failed sbt   -Intubated and sedated on precedex  - s/p lavage and sucction for left sided mucus plaque , with following re expansion   -CT showed mild b/l pleural effusion and left lower lobe consolidation in ICU on day 2  -aspiration pneumonia  - sputum culture grew MRSA and Stenotrophomonas (sensitive to levo)  - will c/w levaquin 500 q24 hours , renal dose and will stop vancomycin given toxic level  -f/u repeat sputum culture  -ID Dr. Patricio    ----------------------------------------Gastrointestinal--------------------------------------  -Patient was bacteremic with bacteroids fragilis, likely secondary from intra abdominal source.   -Patient had multiple SBO in past. Ct abdomen on admission showed ileus, Repeat CT A/P showed ileus and non occlusive ischemic colitis  -No signs of acute abdomen   -Surgery recs > No intervention, pt was admitted in Jan diagnosed with budd Chiari syndrome with portal HTN recs to transfer to [facility] but pt is not stable to transfer  -Pt is in multi organ dysfunction secondary to septic shock  -LFTs are mildly elevated since admission, slightly uptrend today. Likely due to sepsis vs hepatic failure sec to budd Chiari  -BCx X 2 negative.  -ammonia trends down from 150 to 130 >116>103>131>126>90> 77, on lactulose and rifaximin   -monitor BM, titrate lactulose top 2-3 BM qd   -GI, DR Chatterjee deferred doing colonoscopy due to multiple comorbidities, FOBT positive on 10/15  -Dr Katlyn aguilar has been noted,    -----------------------------------------ID---------------------------------------  -Patient was bacteremic with bacteroids likely GI source.  -Lactate is normal 1.3 but trends up to 2.3 >3.7  -Procalcitonin 0.79  -On levoquin and held vanc due to high trough ,   - BP on the lower side, pt is on Levophed baby dose and midodrine 10mg TID  -Monitor vitals Q4      ---------------------------------------------Nephro-------------------------------------  - hypernatremia improved   - on free water 120 ml q6h  -TF  - Creatinine trending yup , will avoid nephrotoxin , patient in MOF   -Monitor UO, decreased UO with anasarca edema     ----------------------------------------Hem Onc----------------------------------  -Has h/o DVt and PE in past. Patient is on Xarelto at home  -Patient has anemia  -anemia of chronic disease  -Hgb 6.8>1PRBC>8.2> 7.9>7.5>7.4  -plt trended up to 96, no active bleeding  - will start on prophylactic dose Ac Heparin 5000 q12 hrs per dr rivera recommendation   -Folate and B12 normal.   -Dr. Rivera recs has been noted, pt started on thiamine for 3 doses.   - will check INR daily     ----------------------------------------Endo--------------------------------  -HgbA1c 4  -Fs q6h    ----------------------------------------Prophylaxis----------------------------  DVT: heparin sq 5000 q12 hrs  GI: PPI    ---------------------------------------GOC---------------------------  -DNR   -Pt with multi organ failure secondary to septic shock with poor prognosis  -Palliative is on board, family meeting for 3PM

## 2020-11-06 NOTE — PROGRESS NOTE ADULT - ASSESSMENT
65y Female from home, lives with daughter, ambulates with walker with PMH of recurrent SBO's, s/p exp lap, SB resection in 2015, ex lap, ALBINA in 2018, DVT, PE, on Xarelto, IVC filter, chronic leg swelling, and anasarca, came in to the ED due to hypotension during office visit. Patient was found to be bacteremic with bacteroids fragilis. Admitted in ICU due to hypotension and hypothermia. On vancomycin and levo . BCx X2 negative. Sputum positive for MRSA and Stenotrophomonas.     Diagnosis:  >Encephalopathy  >Sepsis  >Bacteremia  >Anemia  >DVT/PE  >Transaminitis  >Aspiration pneumonia    --------------------------------------------Neuro--------------------------------------  -She is AAOx2 at baseline on evaluation  -Encephalopathic and got intubated on 10/24  -Ammonia trending down from 152 > 130 > 116 >103>131>126>90>77  -On lactulose 30g q 8h and rifaximin, goal BM 2-3   -INR>1.63>1.71>1.91>1.98> 1.44>  -Pt with multiorgan failure secondary to septic shock  -Hold CT head for now as AMS was most likely due to hepatic encephalopathy and pt didn't have any focal neurological deficit when she started having AMS  -her pupils are reactive to light b/l, patient can follow commands   - started on small dose of Precedex for tachypnea      -------------------------------------CVS-------------------------------  -Patient is hypotensive secondary septic shock  - patients started on levophed   -On abx Levo renally dosed , Vancomycin stopped for high vanc trough ,  -NG tube feeding  -Midodrine 10 mg TID  -RIJ for pressors  -Lasix PRN for improving UO  -Pt is getting lasix and albumin on and off   -previous ECHO 3-4 months back showed normal EF but new ECHO showed EF 15-20% with severe left ventricular dysfunction  -pt with multiorgan dysfunction sec to septic shock , mixed venous gas from [facility] showed borderline low O2 saturation , mixed picture of septic /cardiogenic shock   -f/u on autoimmune workup KATHY, anticardiolipin antibodies IgG IgM , ANCA  -Anticardiolipin abs +ve      --------------------------------Respiratory----------------------  - failed sbt   -Intubated and sedated on precedex  - s/p lavage and sucction for left sided mucus plaque , with following re expansion   -CT showed mild b/l pleural effusion and left lower lobe consolidation in ICU on day 2  -aspiration pneumonia  - sputum culture grew MRSA and Stenotrophomonas (sensitive to levo)  - will c/w levaquin 500 q24 hours , renal dose and will stop vancomycin given toxic level  -f/u repeat sputum culture  -ID Dr. Patricio    ----------------------------------------Gastrointestinal--------------------------------------  -Patient was bacteremic with bacteroids fragilis, likely secondary from intra abdominal source.   -Patient had multiple SBO in past. 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 Likely due to sepsis vs hepatic failure sec to budd Chiari  -BCx X 2 negative.  -ammonia trends down from 150 to 130 >116>103>131>126>90> 77, on lactulose and rifaximin   -monitor BM, titrate lactulose top 2-3 BM qd   -GI, DR Chatterjee deferred doing colonoscopy due to multiple comorbidities, FOBT positive on 10/15  -Dr Katlyn aguilar has been noted,    -----------------------------------------ID---------------------------------------  -Patient was bacteremic with bacteroids likely GI source.  -Lactate is normal 1.3 but trends up to 2.3 >3.7  -Procalcitonin 0.79  -On levoquin and held vanc due to high trough ,   - BP on the lower side, pt is on Levophed baby dose and midodrine 10mg TID  -Monitor vitals Q4      ---------------------------------------------Nephro-------------------------------------  - hypernatremia improved   - on free water 120 ml q6h  -TF  - Creatinine trending yup , will avoid nephrotoxin , patient in MOF   -Monitor UO, decreased UO with anasarca edema     ----------------------------------------Hem Onc----------------------------------  -Has h/o DVt and PE in past. Patient is on Xarelto at home  -Patient has anemia  -anemia of chronic disease  -Hgb 6.8>1PRBC>8.2> 7.9>7.5>7.4  -plt trended up to 96, no active bleeding  - will start on prophylactic dose Ac Heparin 5000 q12 hrs per dr sullivan recommendation   -Folate and B12 normal.   -Dr. Miguel aguilar has been noted, pt started on thiamine for 3 doses.   - will check INR daily     ----------------------------------------Endo--------------------------------  -HgbA1c 4  -Fs q6h    ----------------------------------------Prophylaxis----------------------------  DVT: heparin sq 5000 q12 hrs  GI: PPI    ---------------------------------------GOC---------------------------  -DNR   -Pt with multi organ failure secondary to septic shock with poor prognosis  -Palliative is on board, family meeting for 3PM        65y Female from home, lives with daughter, ambulates with walker with PMH of recurrent SBO's, s/p exp lap, SB resection in 2015, ex lap, ALBINA in 2018, DVT, PE, on Xarelto, IVC filter, chronic leg swelling, and anasarca, came in to the ED due to hypotension during office visit. Patient was found to be bacteremic with bacteroids fragilis. Admitted in ICU due to hypotension and hypothermia. On vancomycin and levo . BCx X2 negative. Sputum positive for MRSA and Stenotrophomonas.     Diagnosis:  >Encephalopathy  >Sepsis  >Bacteremia  >Anemia  >DVT/PE  >Transaminitis  >Aspiration pneumonia    --------------------------------------------Neuro--------------------------------------  -She is AAOx2 at baseline on evaluation  -Encephalopathic and got intubated on 10/24  -Ammonia trending down from 152 > 130 > 116 >103>131>126>90>77----> less than 10  -On lactulose 30g daily and rifaximin, goal BM 2-3   -INR>1.63>1.71>1.91>1.98> 1.44  -Pt with multiorgan failure secondary to septic shock  -Hold CT head for now as AMS was most likely due to hepatic encephalopathy and pt didn't have any focal neurological deficit when she started having AMS  -her pupils are reactive to light b/l, patient can follow commands   - started on small dose of Precedex for tachypnea      -------------------------------------CVS-------------------------------  -Patient is hypotensive secondary septic shock  - patients started on levophed   -On abx Levo renally dosed , Vancomycin stopped for high vanc trough ,  -NG tube feeding  -Midodrine 10 mg TID  -RIJ for pressors  -Lasix PRN for improving UO  -Pt is getting lasix and albumin on and off   -previous ECHO 3-4 months back showed normal EF but new ECHO showed EF 15-20% with severe left ventricular dysfunction  -pt with multiorgan dysfunction sec to septic shock , mixed venous gas from [facility] showed borderline low O2 saturation , mixed picture of septic /cardiogenic shock   -f/u on autoimmune workup KATHY, anticardiolipin antibodies IgG IgM , ANCA  -Anticardiolipin abs +ve      --------------------------------Respiratory----------------------  - failed sbt   -Intubated and sedated on precedex  - s/p lavage and sucction for left sided mucus plaque , with following re expansion   -CT showed mild b/l pleural effusion and left lower lobe consolidation in ICU on day 2  -aspiration pneumonia  - sputum culture grew MRSA and Stenotrophomonas (sensitive to levo)  - will c/w levaquin 500 q24 hours , renal dose and will stop vancomycin given toxic level  -f/u repeat sputum culture  -ID Dr. Patricio    ----------------------------------------Gastrointestinal--------------------------------------  -Patient was bacteremic with bacteroids fragilis, likely secondary from intra abdominal source.   -Patient had multiple SBO in past. 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 Likely due to sepsis vs hepatic failure sec to budd Chiari  -BCx X 2 negative.  -ammonia trends down from 150 to 130 >116>103>131>126>90> 77, on lactulose and rifaximin   -monitor BM, titrate lactulose top 2-3 BM qd   -GI, DR Chatterjee deferred doing colonoscopy due to multiple comorbidities, FOBT positive on 10/15  -Dr Katlyn aguilar has been noted,    -----------------------------------------ID---------------------------------------  -Patient was bacteremic with bacteroids likely GI source.  -Lactate is normal 1.3 but trends up to 2.3 >3.7  -Procalcitonin 0.79  -On levoquin and held vanc due to high trough ,   - BP on the lower side, pt is on Levophed baby dose and midodrine 10mg TID  -Monitor vitals Q4      ---------------------------------------------Nephro-------------------------------------  - hypernatremia improved   - on free water 120 ml q6h  -TF  - Creatinine trended down slightly today , will avoid nephrotoxin , patient in MOF   -Monitor UO,   - UO improved after Lasix trail     ----------------------------------------Hem Onc----------------------------------  -Has h/o DVt and PE in past. Patient is on Xarelto at home  -Patient has anemia  -anemia of chronic disease  -Hgb 6.8>1PRBC>8.2> 7.9>7.5>7.4  -plt trended up to 96, no active bleeding  - will start on prophylactic dose Ac Heparin 5000 q12 hrs per dr sullivan recommendation   -Folate and B12 normal.   -Dr. Miguel aguilar has been noted, pt started on thiamine for 3 doses.   - will check INR daily     ----------------------------------------Endo--------------------------------  -HgbA1c 4  -Fs q6h    ----------------------------------------Prophylaxis----------------------------  DVT: heparin sq 5000 q12 hrs  GI: PPI    ---------------------------------------GOC---------------------------  -DNR   -Pt with multi organ failure secondary to septic shock with poor prognosis  -Palliative is on board

## 2020-11-06 NOTE — PROGRESS NOTE ADULT - ASSESSMENT
Patient is a 65y old  Female from home, lives with daughter, ambulates with walker with PMH of recurrent SBO's, s/p exp lap, SB resection in 2015, ex lap, ALBINA in 2018, DVT, PE, on Xarelto, IVC filter, chronic leg swelling, and anasarca, Now send in to the ER by her PCP, Dr. Ross, for evaluation of  generalized weakness and hypotension BP of 80/40 during office visit. On admission, she found to have no fever and BP was in lower normal range and no Leukocytosis. The CXR is clear and CT abd/pelvis consistent with Ileus. The ID consult requested to assist with evaluation of infectious etiology of episode of hypotension. Found to have Bacteroides bacteremia and now developed septic shock on Cefepime and Flagyl. Hence transferred to ICU. 10/20/20.    # Hypotension  at office- BP of 80/40 - resolved   #  Bacteroides fragilis Bacteremia - 10/13/20 - ? source most likely GI- Repeat Blood Cxs have NGTD 10/16/20  # Ileus  - h/o recurrent SBO and s/p EXp. LAp  # COVID 19 negative   # Septic shock ( Hypothermia + hypotensive)- transferred to ICU since requiring pressor- source GI, The CT abd/pelvis shows nonspecific enterocolitis, noninfectious inflammatory bowel disease, or ischemic bowel, along with ileus.   # Pneumonia- HCAP vs Aspiration - on CXR 10/24 and CT chest 10/21- sputum Cx grew Methicillin resistant Staphylococcus aureus  and Stenotrophomonas maltophilia.      would recommend:    1.   2. Monitor kidney function and adjust Abx doses accordingly   3. Continue renally adjusted doses Levaquin to cover Stenotrophomonas  4. Aspiration precaution  5. Management of Vent as per ICU protocol      d/w ICU team     Attending Attestation:    Spent more than 45 minutes on total encounter, more than 50 % of the visit was spent counseling and/or coordinating care by the Attending physician. Patient is a 65y old  Female from home, lives with daughter, ambulates with walker with PMH of recurrent SBO's, s/p exp lap, SB resection in 2015, ex lap, ALBINA in 2018, DVT, PE, on Xarelto, IVC filter, chronic leg swelling, and anasarca, Now send in to the ER by her PCP, Dr. Ross, for evaluation of  generalized weakness and hypotension BP of 80/40 during office visit. On admission, she found to have no fever and BP was in lower normal range and no Leukocytosis. The CXR is clear and CT abd/pelvis consistent with Ileus. The ID consult requested to assist with evaluation of infectious etiology of episode of hypotension. Found to have Bacteroides bacteremia and now developed septic shock on Cefepime and Flagyl. Hence transferred to ICU. 10/20/20.    # Hypotension  at office- BP of 80/40 - resolved   #  Bacteroides fragilis Bacteremia - 10/13/20 - ? source most likely GI- Repeat Blood Cxs have NGTD 10/16/20  # Ileus  - h/o recurrent SBO and s/p EXp. LAp  # COVID 19 negative   # Septic shock ( Hypothermia + hypotensive)- transferred to ICU since requiring pressor- source GI, The CT abd/pelvis shows nonspecific enterocolitis, noninfectious inflammatory bowel disease, or ischemic bowel, along with ileus.   # Pneumonia- HCAP vs Aspiration - on CXR 10/24 and CT chest 10/21- sputum Cx grew Methicillin resistant Staphylococcus aureus  and Stenotrophomonas maltophilia.      would recommend:    1. Dose of Vancomycin 500 mg X once, and level in AM  2. Monitor kidney function and adjust Abx doses accordingly   3. Continue renally adjusted doses Levaquin to cover Stenotrophomonas  4. Aspiration precaution  5. Management of Vent as per ICU protocol      d/w ICU team     Attending Attestation:    Spent more than 45 minutes on total encounter, more than 50 % of the visit was spent counseling and/or coordinating care by the Attending physician.

## 2020-11-06 NOTE — PROGRESS NOTE ADULT - SUBJECTIVE AND OBJECTIVE BOX
awake  on pressor   no fever or hypothermia    ROS:  Negative except for:    MEDICATIONS  (STANDING):  chlorhexidine 0.12% Liquid 15 milliLiter(s) Oral Mucosa every 12 hours  chlorhexidine 2% Cloths 1 Application(s) Topical daily  dexMEDEtomidine Infusion 0.2 MICROgram(s)/kG/Hr (2.81 mL/Hr) IV Continuous <Continuous>  heparin   Injectable 5000 Unit(s) SubCutaneous every 12 hours  lactulose Syrup 10 Gram(s) Oral daily  levoFLOXacin IVPB 500 milliGRAM(s) IV Intermittent every 24 hours  midodrine. 10 milliGRAM(s) Oral three times a day  norepinephrine Infusion 0.05 MICROgram(s)/kG/Min (2.63 mL/Hr) IV Continuous <Continuous>  nystatin Powder 1 Application(s) Topical two times a day  pantoprazole  Injectable 40 milliGRAM(s) IV Push two times a day  rifAXIMin 550 milliGRAM(s) Oral two times a day    MEDICATIONS  (PRN):  sodium chloride 0.9% lock flush 10 milliLiter(s) IV Push every 1 hour PRN Pre/post blood products, medications, blood draw, and to maintain line patency      Allergies    No Known Allergies    Intolerances        Vital Signs Last 24 Hrs  T(C): 36.7 (06 Nov 2020 06:00), Max: 36.7 (05 Nov 2020 16:54)  T(F): 98.1 (06 Nov 2020 06:00), Max: 98.1 (05 Nov 2020 16:54)  HR: 86 (06 Nov 2020 08:45) (85 - 105)  BP: 117/76 (06 Nov 2020 08:45) (91/47 - 123/79)  BP(mean): 85 (06 Nov 2020 08:45) (58 - 99)  RR: 20 (06 Nov 2020 08:45) (16 - 36)  SpO2: 100% (06 Nov 2020 08:45) (98% - 100%)    PHYSICAL EXAM  General: adult in NAD  HEENT: clear oropharynx, anicteric sclera, pink conjunctiva  Neck: supple  CV: normal S1/S2 with no murmur rubs or gallops  Lungs: positive air movement b/l ant lungs,clear to auscultation, no wheezes, no rales  Abdomen: soft non-tender non-distended, no hepatosplenomegaly  Ext: no clubbing cyanosis or edema  Skin: no rashes and no petechiae  Neuro: alert and oriented X 4, no focal deficits  LABS:                          7.6    8.70  )-----------( 152      ( 06 Nov 2020 06:20 )             21.9         Mean Cell Volume : 89.8 fl  Mean Cell Hemoglobin : 31.1 pg  Mean Cell Hemoglobin Concentration : 34.7 gm/dL  Auto Neutrophil # : 6.93 K/uL  Auto Lymphocyte # : 0.64 K/uL  Auto Monocyte # : 0.96 K/uL  Auto Eosinophil # : 0.07 K/uL  Auto Basophil # : 0.04 K/uL  Auto Neutrophil % : 79.6 %  Auto Lymphocyte % : 7.4 %  Auto Monocyte % : 11.0 %  Auto Eosinophil % : 0.8 %  Auto Basophil % : 0.5 %    Serial CBC  Hematocrit 21.9  Hemoglobin 7.6  Plat 152  RBC 2.44  WBC 8.70  Serial CBC  Hematocrit 21.9  Hemoglobin 7.4  Plat 130  RBC 2.39  WBC 9.84  Serial CBC  Hematocrit 22.6  Hemoglobin 7.5  Plat 96  RBC 2.45  WBC 9.36  Serial CBC  Hematocrit 23.6  Hemoglobin 7.9  Plat 75  RBC 2.53  WBC 9.13    11-06    138  |  104  |  37<H>  ----------------------------<  81  3.9   |  25  |  2.37<H>    Ca    8.1<L>      06 Nov 2020 06:20  Phos  3.4     11-06  Mg     2.0     11-06    TPro  5.1<L>  /  Alb  2.8<L>  /  TBili  9.2<H>  /  DBili  x   /  AST  233<H>  /  ALT  99<H>  /  AlkPhos  96  11-06                    BLOOD SMEAR INTERPRETATION:       RADIOLOGY & ADDITIONAL STUDIES:

## 2020-11-06 NOTE — PROGRESS NOTE ADULT - SUBJECTIVE AND OBJECTIVE BOX
Patient is seen and examined at the bed side, remains afebrile  but went back on pressor.  The creatinine is trending up.      REVIEW OF SYSTEMS: Unable to obtain due to mental status         ALLERGIES: No Known Allergies        ICU Vital Signs Last 24 Hrs  T(C): 36.7 (06 Nov 2020 16:09), Max: 37.1 (06 Nov 2020 10:00)  T(F): 98.1 (06 Nov 2020 16:09), Max: 98.7 (06 Nov 2020 10:00)  HR: 86 (06 Nov 2020 17:00) (85 - 101)  BP: 105/60 (06 Nov 2020 17:00) (97/56 - 123/79)  BP(mean): 70 (06 Nov 2020 17:00) (65 - 99)  ABP: --  ABP(mean): --  RR: 31 (06 Nov 2020 17:00) (16 - 32)  SpO2: 100% (06 Nov 2020 17:00) (98% - 100%)        PHYSICAL EXAM:  GENERAL: Intubated /vented,   CHEST/LUNG: Not using accessory muscles   HEART: s1 and s2 present  ABDOMEN:  Nontender and  Nondistended  EXTREMITIES: B/L UE edematous  CNS: Intubated/vented, awake          LABS:                        7.6    8.70  )-----------( 152      ( 06 Nov 2020 06:20 )             21.9                           7.4    9.84  )-----------( 130      ( 05 Nov 2020 03:13 )             21.9            11-06    138  |  104  |  37<H>  ----------------------------<  81  3.9   |  25  |  2.37<H>    Ca    8.1<L>      06 Nov 2020 06:20  Phos  3.4     11-06  Mg     2.0     11-06    TPro  5.1<L>  /  Alb  2.8<L>  /  TBili  9.2<H>  /  DBili  x   /  AST  233<H>  /  ALT  99<H>  /  AlkPhos  96  11-06    11-05    137  |  104  |  36<H>  ----------------------------<  73  4.5   |  24  |  2.54<H>    Ca    8.2<L>      05 Nov 2020 03:13  Phos  3.9     11-05  Mg     2.1     11-05    TPro  5.5<L>  /  Alb  3.2<L>  /  TBili  8.6<H>  /  DBili  x   /  AST  228<H>  /  ALT  88<H>  /  AlkPhos  108  11-05 11-04    136  |  104  |  29<H>  ----------------------------<  94  4.5   |  22  |  2.25<H>    Ca    8.0<L>      04 Nov 2020 06:12  Phos  3.1     11-04  Mg     2.1     11-04    TPro  5.4<L>  /  Alb  2.9<L>  /  TBili  6.0<H>  /  DBili  x   /  AST  165<H>  /  ALT  72<H>  /  AlkPhos  142<H>  11-04      vanVancomycin Level, Trough (11.06.20 @ 06:20)   Vancomycin Level, Trough: 19.9:     Vancomycin Level, Trough (11.04.20 @ 06:12)   Vancomycin Level, Trough: 31.0:     Vancomycin Level, Trough (11.03.20 @ 04:11)   Vancomycin Level, Trough: 16.1:    Vancomycin Level, Trough (11.01.20 @ 04:59)   Vancomycin Level, Trough: 20.9:    Vancomycin Level, Trough (10.31.20 @ 06:54)   Vancomycin Level, Trough: 20.5:    Vancomycin Level, Trough (10.30.20 @ 06:30)   Vancomycin Level, Trough: 28.1:    Vancomycin Level, Trough (10.29.20 @ 05:11)   Vancomycin Level, Trough: 36.3    Procalcitonin, Serum (10.15.20 @ 11:48)   Procalcitonin, Serum: 0.16: Procalcitonin (PCT) Interpretation (ng/mL) - Diagnosis of systemic   bacterial infection/sepsis         MEDICATIONS  (STANDING):  albumin human 25% IVPB 100 milliLiter(s) IV Intermittent every 6 hours  chlorhexidine 0.12% Liquid 15 milliLiter(s) Oral Mucosa every 12 hours  chlorhexidine 2% Cloths 1 Application(s) Topical daily  dexMEDEtomidine Infusion 0.2 MICROgram(s)/kG/Hr (2.81 mL/Hr) IV Continuous <Continuous>  heparin   Injectable 5000 Unit(s) SubCutaneous every 12 hours  lactulose Syrup 10 Gram(s) Oral daily  levoFLOXacin IVPB 500 milliGRAM(s) IV Intermittent every 24 hours  midodrine. 10 milliGRAM(s) Oral three times a day  norepinephrine Infusion 0.05 MICROgram(s)/kG/Min (2.63 mL/Hr) IV Continuous <Continuous>  nystatin Powder 1 Application(s) Topical two times a day  pantoprazole  Injectable 40 milliGRAM(s) IV Push two times a day  rifAXIMin 550 milliGRAM(s) Oral two times a day          RADIOLOGY & ADDITIONAL TESTS:    11/4/20 : Xray Chest 1 View- PORTABLE-Routine (Xray Chest 1 View- PORTABLE-Routine in AM.) (11.04.20 @ 10:59) Reexpansion of the left lung with residual left pleural effusion and/or infiltrate.  Right pulmonary edema unchanged.  Tubes and catheters in satisfactory position.      10/25/20: Xray Chest 1 View- PORTABLE-Routine (Xray Chest 1 View- PORTABLE-Routine in AM.) (10.25.20 @ 09:21) Frontal expiratory view of the chest shows the heart to be similar in size. Endotracheal tube, right jugular line and feeding tube remain present.    The lungs show similar left upper lobe infiltrate with progression of right perihilar infiltrate. Pleural effusions are similar. There is no evidence of pneumothorax.      10/23/20 : Xray Chest 1 View-PORTABLE IMMEDIATE (Xray Chest 1 View-PORTABLE IMMEDIATE .) (10.23.20 @ 19:09) Feeding tube tip near GE junction as noted. Questionable right upper lobe infiltrate. Follow-up study is recommended as clinically warranted.      10/21/20 : CT Abdomen and Pelvis w/ Oral Cont and w/ IV Cont (10.21.20 @ 15:59) Mural thickening of the left colon and rectum. Liquid stool in the colon. Apparent segmental mural thickening of the mid to distal small bowel and the proximal duodenum. Findings may represent nonspecific enterocolitis, noninfectious inflammatory bowel disease, or ischemic bowel. Clinical correlation is recommended. The celiac axis artery, SMA, and KINA are patent without stenosis.    Dilatation of the mid small bowel is again noted. Oral contrast has reached the terminal ileum. Findings may represent small bowel obstruction, or ileus related to nonspecific enterocolitis.   Cholelithiasis.    Moderate to large ascites in the abdomen, increased since the previous examination. 5 mm nonspecific noncalcified left upper lobe lung nodule; if the patient's is in the high risk category (i.e. smoker), follow-up chest CT may be pursued in 12 months to ensure stability.    Combination of atelectasis and consolidation in the left lower lobe.    Possible 1.0 cm hypodense lesion in the left lobe of the thyroid. Thyroid ultrasound may be pursued for further evaluation.   Mild bilateral pleural effusions.    Aging determinate compression fracture at T5 vertebra.        10/13/20 : CT Abdomen and Pelvis w/ IV Cont (10.13.20 @ 18:50) Diffuse dilatation of small bowel loops without a clear transition is slightly increased, likely an ileus.  Decreased moderate ascites.    1.5 cm pancreatic versus peripancreatic soft tissue nodule    10/13/20 : Xray Chest 1 View- PORTABLE-Urgent (Xray Chest 1 View- PORTABLE-Urgent .) (10.13.20 @ 16:34) Clear lungs.          MICROBIOLOGY DATA:    Culture - Sputum . (10.26.20 @ 00:26)   - Ampicillin/Sulbactam: R <=8/4   - Cefazolin: R >16   - Ceftazidime: I 16   - Clindamycin: R >4   - Erythromycin: R >4   - Gentamicin: S <=1 Should not be used as monotherapy   - Levofloxacin: S <=0.5   - Linezolid: S 2   - Oxacillin: R >2   - Penicillin: R >8   - RIF- Rifampin: S <=1 Should not be used as monotherapy   - Tetra/Doxy: S <=1   - Trimethoprim/Sulfamethoxazole: S <=0.5/9.5   - Trimethoprim/Sulfamethoxazole: S <=0.5/9.5   - Vancomycin: S 1   Gram Stain:  Moderate polymorphonuclear leukocytes per low power field   Rare Squamous epithelial cells per low power field   Moderate Gram Positive Cocci in Clusters per oil power field   Moderate Yeast per oil power field   Specimen Source: .Sputum Sputum   Culture Results:  Moderate Methicillin resistant Staphylococcus aureus   Moderate Stenotrophomonas maltophilia   Normal Respiratory Erin present   Organism Identification: Methicillin resistant Staphylococcus aureus   Stenotrophomonas maltophilia   Organism: Methicillin resistant Staphylococcus aureus   Organism: Stenotrophomonas maltophilia       MRSA/MSSA PCR (10.21.20 @ 09:41)   MRSA PCR Result.: Detected:       Culture - Blood (10.20.20 @ 22:09)   Specimen Source: .Blood Blood   Culture Results:  No growth to date.     Culture - Blood (10.20.20 @ 22:09)   Specimen Source: .Blood Blood   Culture Results:   No growth to date.     Culture - Blood in AM (10.16.20 @ 10:13)   Specimen Source: .Blood Blood-Peripheral   Culture Results:   No growth to date.     Culture - Blood in AM (10.16.20 @ 10:13)   Specimen Source: .Blood Blood-Peripheral   Culture Results:   No growth to date.     Culture - Blood (10.13.20 @ 22:23)   Growth in anaerobic bottle: Gram Negative Rods   Specimen Source: .Blood Blood-Peripheral   Organism: Blood Culture PCR   Culture Results:   Growth in anaerobic bottle: Bacteroides fragilis   "Susceptibilities not performed"     Culture - Blood (10.13.20 @ 22:23)   Specimen Source: .Blood Blood-Peripheral   Culture Results:   No growth to date.     Urine Microscopic-Add On (NC) (10.13.20 @ 21:39)   Bacteria: Moderate /HPF   Epithelial Cells: Moderate /HPF   Red Blood Cell - Urine: 5-10 /HPF   White Blood Cell - Urine: 6-10 /HPF          Patient is seen and examined at the bed side, remains afebrile and on pressor.  The vancomycin level is therapeutic, 19.9.      REVIEW OF SYSTEMS: Unable to obtain due to mental status         ALLERGIES: No Known Allergies        ICU Vital Signs Last 24 Hrs  T(C): 36.7 (06 Nov 2020 16:09), Max: 37.1 (06 Nov 2020 10:00)  T(F): 98.1 (06 Nov 2020 16:09), Max: 98.7 (06 Nov 2020 10:00)  HR: 86 (06 Nov 2020 17:00) (85 - 101)  BP: 105/60 (06 Nov 2020 17:00) (97/56 - 123/79)  BP(mean): 70 (06 Nov 2020 17:00) (65 - 99)  ABP: --  ABP(mean): --  RR: 31 (06 Nov 2020 17:00) (16 - 32)  SpO2: 100% (06 Nov 2020 17:00) (98% - 100%)        PHYSICAL EXAM:  GENERAL: Intubated /vented   CHEST/LUNG: Not using accessory muscles   HEART: s1 and s2 present  ABDOMEN:  Nontender and  Nondistended  EXTREMITIES: B/L UE edematous  CNS: Intubated/vented, awake          LABS:                        7.6    8.70  )-----------( 152      ( 06 Nov 2020 06:20 )             21.9                           7.4    9.84  )-----------( 130      ( 05 Nov 2020 03:13 )             21.9            11-06    138  |  104  |  37<H>  ----------------------------<  81  3.9   |  25  |  2.37<H>    Ca    8.1<L>      06 Nov 2020 06:20  Phos  3.4     11-06  Mg     2.0     11-06    TPro  5.1<L>  /  Alb  2.8<L>  /  TBili  9.2<H>  /  DBili  x   /  AST  233<H>  /  ALT  99<H>  /  AlkPhos  96  11-06    11-05    137  |  104  |  36<H>  ----------------------------<  73  4.5   |  24  |  2.54<H>    Ca    8.2<L>      05 Nov 2020 03:13  Phos  3.9     11-05  Mg     2.1     11-05    TPro  5.5<L>  /  Alb  3.2<L>  /  TBili  8.6<H>  /  DBili  x   /  AST  228<H>  /  ALT  88<H>  /  AlkPhos  108  11-05 11-04    136  |  104  |  29<H>  ----------------------------<  94  4.5   |  22  |  2.25<H>    Ca    8.0<L>      04 Nov 2020 06:12  Phos  3.1     11-04  Mg     2.1     11-04    TPro  5.4<L>  /  Alb  2.9<L>  /  TBili  6.0<H>  /  DBili  x   /  AST  165<H>  /  ALT  72<H>  /  AlkPhos  142<H>  11-04      vanVancomycin Level, Trough (11.06.20 @ 06:20)   Vancomycin Level, Trough: 19.9:     Vancomycin Level, Trough (11.04.20 @ 06:12)   Vancomycin Level, Trough: 31.0:     Vancomycin Level, Trough (11.03.20 @ 04:11)   Vancomycin Level, Trough: 16.1:    Vancomycin Level, Trough (11.01.20 @ 04:59)   Vancomycin Level, Trough: 20.9:    Vancomycin Level, Trough (10.31.20 @ 06:54)   Vancomycin Level, Trough: 20.5:    Vancomycin Level, Trough (10.30.20 @ 06:30)   Vancomycin Level, Trough: 28.1:    Vancomycin Level, Trough (10.29.20 @ 05:11)   Vancomycin Level, Trough: 36.3    Procalcitonin, Serum (10.15.20 @ 11:48)   Procalcitonin, Serum: 0.16: Procalcitonin (PCT) Interpretation (ng/mL) - Diagnosis of systemic   bacterial infection/sepsis         MEDICATIONS  (STANDING):  albumin human 25% IVPB 100 milliLiter(s) IV Intermittent every 6 hours  chlorhexidine 0.12% Liquid 15 milliLiter(s) Oral Mucosa every 12 hours  chlorhexidine 2% Cloths 1 Application(s) Topical daily  dexMEDEtomidine Infusion 0.2 MICROgram(s)/kG/Hr (2.81 mL/Hr) IV Continuous <Continuous>  heparin   Injectable 5000 Unit(s) SubCutaneous every 12 hours  lactulose Syrup 10 Gram(s) Oral daily  levoFLOXacin IVPB 500 milliGRAM(s) IV Intermittent every 24 hours  midodrine. 10 milliGRAM(s) Oral three times a day  norepinephrine Infusion 0.05 MICROgram(s)/kG/Min (2.63 mL/Hr) IV Continuous <Continuous>  nystatin Powder 1 Application(s) Topical two times a day  pantoprazole  Injectable 40 milliGRAM(s) IV Push two times a day  rifAXIMin 550 milliGRAM(s) Oral two times a day          RADIOLOGY & ADDITIONAL TESTS:    11/4/20 : Xray Chest 1 View- PORTABLE-Routine (Xray Chest 1 View- PORTABLE-Routine in AM.) (11.04.20 @ 10:59) Reexpansion of the left lung with residual left pleural effusion and/or infiltrate.  Right pulmonary edema unchanged.  Tubes and catheters in satisfactory position.      10/25/20: Xray Chest 1 View- PORTABLE-Routine (Xray Chest 1 View- PORTABLE-Routine in AM.) (10.25.20 @ 09:21) Frontal expiratory view of the chest shows the heart to be similar in size. Endotracheal tube, right jugular line and feeding tube remain present.    The lungs show similar left upper lobe infiltrate with progression of right perihilar infiltrate. Pleural effusions are similar. There is no evidence of pneumothorax.      10/23/20 : Xray Chest 1 View-PORTABLE IMMEDIATE (Xray Chest 1 View-PORTABLE IMMEDIATE .) (10.23.20 @ 19:09) Feeding tube tip near GE junction as noted. Questionable right upper lobe infiltrate. Follow-up study is recommended as clinically warranted.      10/21/20 : CT Abdomen and Pelvis w/ Oral Cont and w/ IV Cont (10.21.20 @ 15:59) Mural thickening of the left colon and rectum. Liquid stool in the colon. Apparent segmental mural thickening of the mid to distal small bowel and the proximal duodenum. Findings may represent nonspecific enterocolitis, noninfectious inflammatory bowel disease, or ischemic bowel. Clinical correlation is recommended. The celiac axis artery, SMA, and KINA are patent without stenosis.    Dilatation of the mid small bowel is again noted. Oral contrast has reached the terminal ileum. Findings may represent small bowel obstruction, or ileus related to nonspecific enterocolitis.   Cholelithiasis.    Moderate to large ascites in the abdomen, increased since the previous examination. 5 mm nonspecific noncalcified left upper lobe lung nodule; if the patient's is in the high risk category (i.e. smoker), follow-up chest CT may be pursued in 12 months to ensure stability.    Combination of atelectasis and consolidation in the left lower lobe.    Possible 1.0 cm hypodense lesion in the left lobe of the thyroid. Thyroid ultrasound may be pursued for further evaluation.   Mild bilateral pleural effusions.    Aging determinate compression fracture at T5 vertebra.        10/13/20 : CT Abdomen and Pelvis w/ IV Cont (10.13.20 @ 18:50) Diffuse dilatation of small bowel loops without a clear transition is slightly increased, likely an ileus.  Decreased moderate ascites.    1.5 cm pancreatic versus peripancreatic soft tissue nodule    10/13/20 : Xray Chest 1 View- PORTABLE-Urgent (Xray Chest 1 View- PORTABLE-Urgent .) (10.13.20 @ 16:34) Clear lungs.          MICROBIOLOGY DATA:    Culture - Sputum . (10.26.20 @ 00:26)   - Ampicillin/Sulbactam: R <=8/4   - Cefazolin: R >16   - Ceftazidime: I 16   - Clindamycin: R >4   - Erythromycin: R >4   - Gentamicin: S <=1 Should not be used as monotherapy   - Levofloxacin: S <=0.5   - Linezolid: S 2   - Oxacillin: R >2   - Penicillin: R >8   - RIF- Rifampin: S <=1 Should not be used as monotherapy   - Tetra/Doxy: S <=1   - Trimethoprim/Sulfamethoxazole: S <=0.5/9.5   - Trimethoprim/Sulfamethoxazole: S <=0.5/9.5   - Vancomycin: S 1   Gram Stain:  Moderate polymorphonuclear leukocytes per low power field   Rare Squamous epithelial cells per low power field   Moderate Gram Positive Cocci in Clusters per oil power field   Moderate Yeast per oil power field   Specimen Source: .Sputum Sputum   Culture Results:  Moderate Methicillin resistant Staphylococcus aureus   Moderate Stenotrophomonas maltophilia   Normal Respiratory Erin present   Organism Identification: Methicillin resistant Staphylococcus aureus   Stenotrophomonas maltophilia   Organism: Methicillin resistant Staphylococcus aureus   Organism: Stenotrophomonas maltophilia       MRSA/MSSA PCR (10.21.20 @ 09:41)   MRSA PCR Result.: Detected:       Culture - Blood (10.20.20 @ 22:09)   Specimen Source: .Blood Blood   Culture Results:  No growth to date.     Culture - Blood (10.20.20 @ 22:09)   Specimen Source: .Blood Blood   Culture Results:   No growth to date.     Culture - Blood in AM (10.16.20 @ 10:13)   Specimen Source: .Blood Blood-Peripheral   Culture Results:   No growth to date.     Culture - Blood in AM (10.16.20 @ 10:13)   Specimen Source: .Blood Blood-Peripheral   Culture Results:   No growth to date.     Culture - Blood (10.13.20 @ 22:23)   Growth in anaerobic bottle: Gram Negative Rods   Specimen Source: .Blood Blood-Peripheral   Organism: Blood Culture PCR   Culture Results:   Growth in anaerobic bottle: Bacteroides fragilis   "Susceptibilities not performed"     Culture - Blood (10.13.20 @ 22:23)   Specimen Source: .Blood Blood-Peripheral   Culture Results:   No growth to date.     Urine Microscopic-Add On (NC) (10.13.20 @ 21:39)   Bacteria: Moderate /HPF   Epithelial Cells: Moderate /HPF   Red Blood Cell - Urine: 5-10 /HPF   White Blood Cell - Urine: 6-10 /HPF

## 2020-11-06 NOTE — CHART NOTE - NSCHARTNOTEFT_GEN_A_CORE
Assessment:   Patient is a 65y old  Female who presents with a chief complaint of Hypotension (2020 15:11). Pt intubed. TF ordered (with Nepro). Pt seen, this PM. Tf hanging, not currently infusing. Pt with MOF, noted as improving      Factors impacting intake: [ ] none [ ] nausea  [ ] vomiting [ ] diarrhea [ ] constipation  [ ]chewing problems [ ] swallowing issues  [x ] other: Vent/ TF, MOF.    Diet Prescription: Diet, NPO with Tube Feed:   Tube Feeding Modality: Nasogastric  Nepro with Carb Steady  Total Volume for 24 Hours (mL): 960  Continuous  Starting Tube Feed Rate {mL per Hour}: 10  Increase Tube Feed Rate by (mL): 10     Every 4 hours  Until Goal Tube Feed Rate (mL per Hour): 40  Tube Feed Duration (in Hours): 24  Tube Feed Start Time: 12:00 (20 @ 10:38)        Daily     Daily Weight in k.1 (2020 09:00)  Weight in k.8 (2020 07:00)  Weight in k.5 (2020 07:00)  Weight in k.3 (2020 07:00)  Weight in k.1 (2020 07:00)  Weight in k.6 (30 Oct 2020 07:30)  Weight in k.8 (29 Oct 2020 07:15)  Weight in k.7 (28 Oct 2020 07:30)  Weight in k.2 (27 Oct 2020 07:00)  Weight in k (26 Oct 2020 07:15)  Weight in k.8 (25 Oct 2020 07:00)    % Weight Change: 4+ generalized edema, I>O    Pertinent Medications: MEDICATIONS  (STANDING):  albumin human 25% IVPB 100 milliLiter(s) IV Intermittent every 6 hours  chlorhexidine 0.12% Liquid 15 milliLiter(s) Oral Mucosa every 12 hours  chlorhexidine 2% Cloths 1 Application(s) Topical daily  dexMEDEtomidine Infusion 0.2 MICROgram(s)/kG/Hr (2.81 mL/Hr) IV Continuous <Continuous>  heparin   Injectable 5000 Unit(s) SubCutaneous every 12 hours  lactulose Syrup 10 Gram(s) Oral daily  levoFLOXacin IVPB 500 milliGRAM(s) IV Intermittent every 24 hours  midodrine. 10 milliGRAM(s) Oral three times a day  norepinephrine Infusion 0.05 MICROgram(s)/kG/Min (2.63 mL/Hr) IV Continuous <Continuous>  nystatin Powder 1 Application(s) Topical two times a day  pantoprazole  Injectable 40 milliGRAM(s) IV Push two times a day  rifAXIMin 550 milliGRAM(s) Oral two times a day    MEDICATIONS  (PRN):  sodium chloride 0.9% lock flush 10 milliLiter(s) IV Push every 1 hour PRN Pre/post blood products, medications, blood draw, and to maintain line patency    Pertinent Labs:  Na138 mmol/L Glu 81 mg/dL K+ 3.9 mmol/L Cr  2.37 mg/dL<H> BUN 37 mg/dL<H>  Phos 3.4 mg/dL  Alb 2.8 g/dL<L> 10-31 Chol --    LDL --    HDL --    Trig 87 mg/dL     CAPILLARY BLOOD GLUCOSE      POCT Blood Glucose.: 84 mg/dL (2020 06:09)  POCT Blood Glucose.: 73 mg/dL (2020 00:06)  POCT Blood Glucose.: 95 mg/dL (2020 19:08)    :     Estimated Needs:   [x ] no change since previous assessment  [ ] recalculated:       Previous Nutrition Diagnosis:   [ ] Altered GI function  [ ]Inadequate Oral Intake [ ] Swallowing Difficulty   [ ] Altered nutrition related labs [ ] Increased Nutrient Needs [ ] Overweight/Obesity   [ ] Unintended Weight Loss [ ] Food & Nutrition Related Knowledge Deficit [x ] Malnutrition (severe PCM)  [ ] Other:     Nutrition Diagnosis is [x ] ongoing  [ ] resolved [ ] not applicable     New Nutrition Diagnosis: [ ] not applicable       Interventions:   Recommend  [ ] Change Diet To:  [ ] Nutrition Supplement  [x ] Nutrition Support: If continuing with Nepro reduce to 30x24 (720 ml, 1296 kcals, 58 gm protein). MD to monitor. RD available.   [ ] Other:     Monitoring and Evaluation:    [ x ] Tolerance to diet prescription [ x ] weights [ x ] labs[ x ] follow up per protocol  [ ] other:

## 2020-11-06 NOTE — PROGRESS NOTE ADULT - ASSESSMENT
64 yo Female with Hx of recurrent SBO`s s/p exp lap, small bowel resection in 2015, ex lap, ALBINA in 2018, DVT, PE (was on Xarelto), IVC filter and s/p supra-hepatic IVC thrombosis/occlusion, anemia, anasarca was sent to ED by PCP for hypotension, generalized weakness and chills and was admitted on 10/13/2020 for hypotension, r/o sepsis , found to have Bacteroides fragilis bacteremia, suspected GI source, was treated with cefepime + metronidazole, remained hypothermic, hypotensive, was transferred to ICU and switched to meropenem on 10/21. Repeat CT 10/21 suggested  non-specific enterocolitis, noninfectious inflammatory bowel disease or ischemic bowel along with ileus, and concern for SBO given mild luminal narrowing at distal small bowel. Patient remained hypotensive, hypothermic, requiring pressor support, septic and became encephalopathic and got intubated on 10/24, also diagnosed with HCAP vs. aspirational PNA, sputum growing MRSA and Stenotrophomonas maltophilia.  She was switched to levofloxacin on 10/27 and now on levo plus vancomycin. 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 Liver enzymes were /are up-trending, along with bilirubin, was suspected due to ongoing sepsis. Patient also noted with cardiomyopathy with EF 10-15%. Remains intubated, but with improving mental status. She is back on pressor support.     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 (as per d/w primary team, it was non-conclusive)  - No caudate lobe hypertrophy reported (present in 75% of Budd Chiari cases), no macroregenerative nodules reported, characteristic pattern of parenchymal perfusion not described,, but study reported limited due to motion artifact   - Venography would be gold standard for diagnosis, discussed earlier with IR, if patient will be more stable (possibly outpatient), can be evaluated at [facility] for Dx and potential intervention, however this time patient remains intubated , on pressor and now with EF 10-15 %  - was already on full dose anticoagulation, was being held b/o coagulopathy and now b/o severe thrombocytopenia, AC per primary team/hem  - portal vein and IVC were patent on recent imaging  - Repeat US abd was done, but was limited and no comment either on hepatic vein again or on collaterals  - anticardiolipin antibody pos 1x (prior negative 1x)     # Abnormal liver enzymes with hyperbilirubinemia and coagulopathy, now MOF  # Ascites  - Elevated liver enzymes were thought partially due to ongoing sepsis (only mildly elevated or normal on admission and started to worsen when patient condition deteriorated, became hypotensive, hypothermic; AST 46-28-...248...-104-114-112; ALT 70-45...105-..67-61-61; ALP  113 -245-282; Se bi 0.6-1.4-...5.5-6.2-5.8), now again worsening -228, ALT 72 -88, -108,  se bi 6.0-8.6  - Mgmt. of sepsis per ICU/ID  - Also noted severely reduced LVEF (10-15%) and RV dysfunction, thus hepatic congestion can also contribute (had nl EF in 5/2020)  - Dx paracentesis could be useful (last 2 sets of BCx nl) but as d/w IR, no safe window based on last CT and now small ascites on recent US abdomen - US was limited, check with IR if amenable for Dx paracentesis  - Prior ascites analysis from 2019: ascites albumin< 0.2, protein < 1; serum albumin 1.0; SAAG< 1.1 (early Budd Chiari would usually give SAAG > 1.1, total protein > 2.5, and in late Budd Chiari SAAG> 1.1 and total protein < 2.5), SAAG < 1.1 with low protein could be due to bowel obstruction/infarction, serositis among others  - prior liver workup during prior admissions: KATHY neg, AMA neg, HBsAg neg, HBcAb IgM neg, HAV IgM neg)  - Given that still up-trending bilirubin, and coagulopathy, resent acute hep panel - negative, EBV - past infection, HSV IgG pos, f/u CMV, VZV, hep E; KATHY neg  - anticardiolipin IgM pos, beta 2 glycoprotein pos     # Encephalopathy, possibly multifactorial; Mental status overall improved, following commands  - c/w lactulose and c/w rifaximin   - CT head was not done b/o lack of focal signs  - on low dose precedex for tachypnea    Will continue to follow  D/w primary team  Thank you for the consult

## 2020-11-06 NOTE — PROGRESS NOTE ADULT - SUBJECTIVE AND OBJECTIVE BOX
Chief Complaint:  Patient is a 65y old  Female who presents with a chief complaint of Hypotension (06 Nov 2020 09:15)      Reason for consult: r/o Budd Chiari    Interval Events: Patient remains on low dose pressor, intubated, but with improved mental status, awake, alert, following commands, nodding appropriately to questions.     Hospital Medications:  albumin human 25% IVPB 100 milliLiter(s) IV Intermittent every 6 hours  chlorhexidine 0.12% Liquid 15 milliLiter(s) Oral Mucosa every 12 hours  chlorhexidine 2% Cloths 1 Application(s) Topical daily  dexMEDEtomidine Infusion 0.2 MICROgram(s)/kG/Hr IV Continuous <Continuous>  heparin   Injectable 5000 Unit(s) SubCutaneous every 12 hours  lactulose Syrup 10 Gram(s) Oral daily  levoFLOXacin IVPB 500 milliGRAM(s) IV Intermittent every 24 hours  midodrine. 10 milliGRAM(s) Oral three times a day  norepinephrine Infusion 0.05 MICROgram(s)/kG/Min IV Continuous <Continuous>  nystatin Powder 1 Application(s) Topical two times a day  pantoprazole  Injectable 40 milliGRAM(s) IV Push two times a day  rifAXIMin 550 milliGRAM(s) Oral two times a day  sodium chloride 0.9% lock flush 10 milliLiter(s) IV Push every 1 hour PRN      ROS:   Unable to obtain due to patient condition. Denies pain.    PHYSICAL EXAM:   Vital Signs:  Vital Signs Last 24 Hrs  T(C): 37.1 (06 Nov 2020 10:00), Max: 37.1 (06 Nov 2020 10:00)  T(F): 98.7 (06 Nov 2020 10:00), Max: 98.7 (06 Nov 2020 10:00)  HR: 89 (06 Nov 2020 14:30) (85 - 105)  BP: 109/68 (06 Nov 2020 14:30) (97/56 - 123/79)  BP(mean): 77 (06 Nov 2020 14:30) (65 - 99)  RR: 28 (06 Nov 2020 14:30) (16 - 32)  SpO2: 100% (06 Nov 2020 14:30) (98% - 100%)  Daily     Daily     GENERAL: no acute distress  NEURO: awake, alert, following commands  HEENT: icteric sclera  CHEST: intubated on vent  CARDIAC: regular rate, rhythm  ABDOMEN: soft, non-tender, non-distended, no rebound or guarding, BS+  EXTREMITIES: B/l edema  SKIN: small bullae on LE    LABS: reviewed                        7.6    8.70  )-----------( 152      ( 06 Nov 2020 06:20 )             21.9     11-06    138  |  104  |  37<H>  ----------------------------<  81  3.9   |  25  |  2.37<H>    Ca    8.1<L>      06 Nov 2020 06:20  Phos  3.4     11-06  Mg     2.0     11-06    TPro  5.1<L>  /  Alb  2.8<L>  /  TBili  9.2<H>  /  DBili  x   /  AST  233<H>  /  ALT  99<H>  /  AlkPhos  96  11-06    LIVER FUNCTIONS - ( 06 Nov 2020 06:20 )  Alb: 2.8 g/dL / Pro: 5.1 g/dL / ALK PHOS: 96 U/L / ALT: 99 U/L DA / AST: 233 U/L / GGT: x             Interval Diagnostic Studies: see sunrise for full report     Statement Selected

## 2020-11-07 NOTE — PROGRESS NOTE ADULT - SUBJECTIVE AND OBJECTIVE BOX
awake  recognize me  no fever  still on vent    ROS:  Negative except for:    MEDICATIONS  (STANDING):  chlorhexidine 0.12% Liquid 15 milliLiter(s) Oral Mucosa every 12 hours  chlorhexidine 2% Cloths 1 Application(s) Topical daily  dexMEDEtomidine Infusion 0.2 MICROgram(s)/kG/Hr (2.81 mL/Hr) IV Continuous <Continuous>  furosemide   Injectable 80 milliGRAM(s) IV Push every 12 hours  heparin   Injectable 5000 Unit(s) SubCutaneous every 12 hours  lactulose Syrup 10 Gram(s) Oral daily  levoFLOXacin IVPB      midodrine. 10 milliGRAM(s) Oral three times a day  norepinephrine Infusion 0.05 MICROgram(s)/kG/Min (2.63 mL/Hr) IV Continuous <Continuous>  nystatin Powder 1 Application(s) Topical two times a day  pantoprazole  Injectable 40 milliGRAM(s) IV Push two times a day  rifAXIMin 550 milliGRAM(s) Oral two times a day    MEDICATIONS  (PRN):  sodium chloride 0.9% lock flush 10 milliLiter(s) IV Push every 1 hour PRN Pre/post blood products, medications, blood draw, and to maintain line patency      Allergies    No Known Allergies    Intolerances        Vital Signs Last 24 Hrs  T(C): 36.3 (07 Nov 2020 12:30), Max: 37.8 (07 Nov 2020 05:30)  T(F): 97.3 (07 Nov 2020 12:30), Max: 100 (07 Nov 2020 05:30)  HR: 76 (07 Nov 2020 12:45) (74 - 89)  BP: 102/63 (07 Nov 2020 12:45) (88/52 - 113/58)  BP(mean): 72 (07 Nov 2020 12:45) (59 - 81)  RR: 24 (07 Nov 2020 12:45) (16 - 31)  SpO2: 100% (07 Nov 2020 12:45) (99% - 100%)    PHYSICAL EXAM  General: adult in NAD  HEENT: clear oropharynx, anicteric sclera, pink conjunctiva  Neck: supple  CV: normal S1/S2 with no murmur rubs or gallops  Lungs: positive air movement b/l ant lungs,clear to auscultation, no wheezes, no rales  Abdomen: soft non-tender non-distended, no hepatosplenomegaly  Ext: no clubbing cyanosis or edema  Skin: no rashes and no petechiae  Neuro: alert and oriented X 4, no focal deficits  LABS:                          6.7    7.74  )-----------( 134      ( 07 Nov 2020 11:08 )             19.7         Mean Cell Volume : 90.4 fl  Mean Cell Hemoglobin : 30.7 pg  Mean Cell Hemoglobin Concentration : 34.0 gm/dL  Auto Neutrophil # : x  Auto Lymphocyte # : x  Auto Monocyte # : x  Auto Eosinophil # : x  Auto Basophil # : x  Auto Neutrophil % : x  Auto Lymphocyte % : x  Auto Monocyte % : x  Auto Eosinophil % : x  Auto Basophil % : x    Serial CBC  Hematocrit 19.7  Hemoglobin 6.7  Plat 134  RBC 2.18  WBC 7.74  Serial CBC  Hematocrit 19.4  Hemoglobin 6.6  Plat 139  RBC 2.15  WBC 7.08  Serial CBC  Hematocrit 21.9  Hemoglobin 7.6  Plat 152  RBC 2.44  WBC 8.70  Serial CBC  Hematocrit 21.9  Hemoglobin 7.4  Plat 130  RBC 2.39  WBC 9.84  Serial CBC  Hematocrit 22.6  Hemoglobin 7.5  Plat 96  RBC 2.45  WBC 9.36    11-07    141  |  107  |  34<H>  ----------------------------<  111<H>  3.5   |  25  |  2.21<H>    Ca    8.1<L>      07 Nov 2020 07:08  Phos  2.8     11-07  Mg     1.9     11-07    TPro  5.2<L>  /  Alb  3.2<L>  /  TBili  8.2<H>  /  DBili  x   /  AST  146<H>  /  ALT  67<H>  /  AlkPhos  85  11-07                    BLOOD SMEAR INTERPRETATION:       RADIOLOGY & ADDITIONAL STUDIES:

## 2020-11-07 NOTE — PROGRESS NOTE ADULT - SUBJECTIVE AND OBJECTIVE BOX
Patient was seen and examined  Patient is a 65y old  Female who presents with a chief complaint of Hypotension (07 Nov 2020 03:09)      INTERVAL HPI/OVERNIGHT EVENTS:  T(C): 37.6 (11-06-20 @ 23:15), Max: 37.6 (11-06-20 @ 23:15)  HR: 79 (11-07-20 @ 06:45) (76 - 97)  BP: 97/58 (11-07-20 @ 06:45) (88/52 - 119/81)  RR: 26 (11-07-20 @ 06:45) (16 - 32)  SpO2: 100% (11-07-20 @ 06:45) (97% - 100%)  Wt(kg): --  I&O's Summary    06 Nov 2020 07:01  -  07 Nov 2020 07:00  --------------------------------------------------------  IN: 1095.9 mL / OUT: 1375 mL / NET: -279.1 mL        LABS:                        7.6    8.70  )-----------( 152      ( 06 Nov 2020 06:20 )             21.9     11-06    138  |  104  |  37<H>  ----------------------------<  81  3.9   |  25  |  2.37<H>    Ca    8.1<L>      06 Nov 2020 06:20  Phos  3.4     11-06  Mg     2.0     11-06    TPro  5.1<L>  /  Alb  2.8<L>  /  TBili  9.2<H>  /  DBili  x   /  AST  233<H>  /  ALT  99<H>  /  AlkPhos  96  11-06        CAPILLARY BLOOD GLUCOSE      POCT Blood Glucose.: 120 mg/dL (07 Nov 2020 06:35)  POCT Blood Glucose.: 80 mg/dL (06 Nov 2020 18:03)        ABG - ( 06 Nov 2020 04:23 )  pH, Arterial: 7.40  pH, Blood: x     /  pCO2: 38    /  pO2: 132   / HCO3: 23    / Base Excess: -0.9  /  SaO2: 99                    MEDICATIONS  (STANDING):  chlorhexidine 0.12% Liquid 15 milliLiter(s) Oral Mucosa every 12 hours  chlorhexidine 2% Cloths 1 Application(s) Topical daily  dexMEDEtomidine Infusion 0.2 MICROgram(s)/kG/Hr (2.81 mL/Hr) IV Continuous <Continuous>  heparin   Injectable 5000 Unit(s) SubCutaneous every 12 hours  lactulose Syrup 10 Gram(s) Oral daily  levoFLOXacin IVPB 500 milliGRAM(s) IV Intermittent every 24 hours  midodrine. 10 milliGRAM(s) Oral three times a day  norepinephrine Infusion 0.05 MICROgram(s)/kG/Min (2.63 mL/Hr) IV Continuous <Continuous>  nystatin Powder 1 Application(s) Topical two times a day  pantoprazole  Injectable 40 milliGRAM(s) IV Push two times a day  rifAXIMin 550 milliGRAM(s) Oral two times a day    MEDICATIONS  (PRN):  sodium chloride 0.9% lock flush 10 milliLiter(s) IV Push every 1 hour PRN Pre/post blood products, medications, blood draw, and to maintain line patency      RADIOLOGY & ADDITIONAL TESTS:    Imaging Personally Reviewed:  [ ] YES  [ ] NO    REVIEW OF SYSTEMS:  unable   ALLERY AND IMMUNOLOGIC: No hives or eczema      Consultant(s) Notes Reviewed:  [ ] YES  [ ] NO    PHYSICAL EXAM:  GENERAL: NAD, well-groomed, well-developed  HEAD:  Atraumatic, Normocephalic  EYES: EOMI, PERRLA, conjunctiva and sclera clear  ENMT: No tonsillar erythema, exudates, or enlargement; Moist mucous membranes, Good dentition, No lesions  NECK: Supple, No JVD, Normal thyroid  NERVOUS SYSTEM: sedated on vent   CHEST/LUNG: bl rhonchi  HEART: Regular rate and rhythm; No murmurs, rubs, or gallops  ABDOMEN: Soft, Nontender, Nondistended; Bowel sounds present  EXTREMITIES:  2+ Peripheral Pulses, No clubbing, cyanosis, or edema  LYMPH: No lymphadenopathy noted  SKIN: No rashes or lesions    Care Discussed with Consultants/Other Providers [ x] YES  [ ] NO

## 2020-11-07 NOTE — PROGRESS NOTE ADULT - ASSESSMENT
65y Female from home, lives with daughter, ambulates with walker with PMH of recurrent SBO's, s/p exp lap, SB resection in 2015, ex lap, ALBINA in 2018, DVT, PE, on Xarelto, IVC filter, chronic leg swelling, and anasarca, came in to the ED due to hypotension during office visit. Patient was found to be bacteremic with bacteroids fragilis. Admitted in ICU due to hypotension and hypothermia. On vancomycin and levo . BCx X2 negative. Sputum positive for MRSA and Stenotrophomonas.     Diagnosis:  >Encephalopathy  >Sepsis  >Bacteremia  >Anemia  >DVT/PE  >Transaminitis  >Aspiration pneumonia    --------------------------------------------Neuro--------------------------------------  -She is AAOx2 at baseline on evaluation  -Encephalopathic and got intubated on 10/24  -Ammonia trending down from 152 > 130 > 116 >103>131>126>90>77----> less than 10  -On lactulose 30g daily and rifaximin, goal BM 2-3   -INR>1.63>1.71>1.91>1.98> 1.44  -Pt with multiorgan failure secondary to septic shock  -Hold CT head for now as AMS was most likely due to hepatic encephalopathy and pt didn't have any focal neurological deficit when she started having AMS  -her pupils are reactive to light b/l, patient can follow commands   - started on small dose of Precedex for tachypnea  -Off to Precedex , RR 20      -------------------------------------CVS-------------------------------  -Patient is hypotensive secondary septic shock  - patients started on levophed   -On abx Levo renally dosed , Vancomycin stopped for high vanc trough ,  -NG tube feeding  -Midodrine 10 mg TID  -RIJ for pressors  -Lasix PRN for improving UO  -Pt is getting lasix and albumin on and off   -previous ECHO 3-4 months back showed normal EF but new ECHO showed EF 15-20% with severe left ventricular dysfunction  -pt with multiorgan dysfunction sec to septic shock , mixed venous gas from [facility] showed borderline low O2 saturation , mixed picture of septic /cardiogenic shock   - anticardiolipin antibodies IgG IgM , ANCA  -Anticardiolipin abs +ve      --------------------------------Respiratory----------------------  - failed sbt   -Intubated and sedated on levophed for hypotesnion  - s/p lavage and suction for left sided mucus plaque , with following re expansion   -CT showed mild b/l pleural effusion and left lower lobe consolidation in ICU on day 2  -aspiration pneumonia  - sputum culture grew MRSA and Stenotrophomonas (sensitive to levo)  - will c/w levaquin 500 q24 hours , stopped vancomycin given toxic level  -given one dose of vanc 500 overnight   -will obtain vanc trough in am  -repeat sputum culture neg  -ID Dr. Patricio    ----------------------------------------Gastrointestinal--------------------------------------  -Patient was bacteremic with bacteroids fragilis, likely secondary from intra abdominal source.   -Patient had multiple SBO in past. 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 Likely due to sepsis vs hepatic failure sec to budd Chiari  -BCx X 2 negative.  -ammonia trends down from 150 to 130 >116>103>131>126>90> 77,--<10 on lactulose and rifaximin   -monitor BM, titrate lactulose top 2-3 BM qd   -GI, DR Chatterjee deferred doing colonoscopy due to multiple comorbidities, FOBT positive on 10/15  -Dr Katlyn aguilar has been noted,    -----------------------------------------ID---------------------------------------  -Patient was bacteremic with bacteroids likely GI source.  -Lactate is normal 1.3 but trends up to 2.3 >3.7  -Procalcitonin 0.79  -On levoquin and held vanc due to high trough ,   - BP on the lower side, pt is on Levophed baby dose and midodrine 10mg TID  -Monitor vitals Q4      ---------------------------------------------Nephro-------------------------------------  - hypernatremia improved   - on free water 120 ml q6h  -TF  - Creatinine trended down slightly today , will avoid nephrotoxin , patient in MOF   -Monitor UO,   - UO improved after Lasix trail     ----------------------------------------Hem Onc----------------------------------  -Has h/o DVt and PE in past. Patient is on Xarelto at home  -Patient has anemia  -anemia of chronic disease  -Hgb 6.8>1PRBC>8.2> 7.9>7.5>7.4>7.6  -plt trended up to 96, no active bleeding  - will start on prophylactic dose Ac Heparin 5000 q12 hrs per dr rivera recommendation   -Folate and B12 normal.   -Dr. Rivera recs following, s/p thiamine 3 doses  - will check INR daily     ----------------------------------------Endo--------------------------------  -HgbA1c 4  -Fs q6h    ----------------------------------------Prophylaxis----------------------------  DVT: heparin sq 5000 q12 hrs  GI: PPI    ---------------------------------------GOC---------------------------  -DNR   -Pt with multi organ failure secondary to septic shock with poor prognosis  -Palliative is on board  -family discussion next week         65y Female from home, lives with daughter, ambulates with walker with PMH of recurrent SBO's, s/p exp lap, SB resection in 2015, ex lap, ALBINA in 2018, DVT, PE, on Xarelto, IVC filter, chronic leg swelling, and anasarca, came in to the ED due to hypotension during office visit. Patient was found to be bacteremic with bacteroids fragilis. Admitted in ICU due to hypotension and hypothermia. On vancomycin and levo . BCx X2 negative. Sputum positive for MRSA and Stenotrophomonas.     Diagnosis:  >Encephalopathy  >Sepsis  >Bacteremia  >Anemia  >DVT/PE  >Transaminitis  >Aspiration pneumonia    --------------------------------------------Neuro--------------------------------------  -She is AAOx2 at baseline on evaluation  -Encephalopathic and got intubated on 10/24  -Ammonia trending down from 152 > 130 > 116 >103>131>126>90>77----> less than 10  -On lactulose 30g daily and rifaximin, goal BM 2-3   -INR>1.63>1.71>1.91>1.98> 1.44  -Pt with multiorgan failure secondary to septic shock  -Hold CT head for now as AMS was most likely due to hepatic encephalopathy and pt didn't have any focal neurological deficit when she started having AMS  -her pupils are reactive to light b/l, patient can follow commands   - started on small dose of Precedex for tachypnea  -Off to Precedex , RR 20      -------------------------------------CVS-------------------------------  -Patient is hypotensive secondary septic shock  - patients started on levophed   -On abx Levo renally dosed , Vancomycin stopped for high vanc trough ,  -NG tube feeding  -Midodrine 10 mg TID  -RIJ for pressors  -Lasix PRN for improving UO  -Pt is getting lasix and albumin on and off   -previous ECHO 3-4 months back showed normal EF but new ECHO showed EF 15-20% with severe left ventricular dysfunction  -pt with multiorgan dysfunction sec to septic shock , mixed venous gas from [facility] showed borderline low O2 saturation , mixed picture of septic /cardiogenic shock   - anticardiolipin antibodies IgG IgM , ANCA  -Anticardiolipin abs +ve      --------------------------------Respiratory----------------------  - failed sbt   -Intubated and sedated on levophed for hypotesnion  - s/p lavage and suction for left sided mucus plaque , with following re expansion   -CT showed mild b/l pleural effusion and left lower lobe consolidation in ICU on day 2  -aspiration pneumonia  - sputum culture grew MRSA and Stenotrophomonas (sensitive to levo)  - will c/w levaquin 500 q24 hours , stopped vancomycin given toxic level  -given one dose of vanc 500 overnight   -will obtain vanc trough in am  -repeat sputum culture neg  -ID Dr. Patricio    ----------------------------------------Gastrointestinal--------------------------------------  -Patient was bacteremic with bacteroids fragilis, likely secondary from intra abdominal source.   -Patient had multiple SBO in past. 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 Likely due to sepsis vs hepatic failure sec to budd Chiari  -BCx X 2 negative.  -ammonia trends down from 150 to 130 >116>103>131>126>90> 77,--<10 on lactulose and rifaximin   -monitor BM, titrate lactulose top 2-3 BM qd   -GI, DR Chatterjee deferred doing colonoscopy due to multiple comorbidities, FOBT positive on 10/15  -Dr Katlyn aguilar has been noted,    -----------------------------------------ID---------------------------------------  -Patient was bacteremic with bacteroids likely GI source.  -Lactate is normal 1.3 but trends up to 2.3 >3.7  -Procalcitonin 0.79  -On levoquin and held vanc due to high trough ,   - BP on the lower side, pt is on Levophed baby dose and midodrine 10mg TID  -Monitor vitals Q4  - Given 1 dose Vancomycin 500mg 11/6 PM  - F/U Vanc trough 10PM    ---------------------------------------------Nephro-------------------------------------  - hypernatremia improved   - on free water 120 ml q6h  -TF  - Creatinine trended down slightly today , will avoid nephrotoxin , patient in MOF   -Monitor UO,   - UO improved after Lasix trail     ----------------------------------------Hem Onc----------------------------------  -Has h/o DVt and PE in past. Patient is on Xarelto at home  -Patient has anemia  -anemia of chronic disease  -Hgb 6.8>1PRBC>8.2> 7.9>7.5>7.4>7.6  -plt trended up to 96, no active bleeding  - will start on prophylactic dose Ac Heparin 5000 q12 hrs per dr rivera recommendation   -Folate and B12 normal.   -Dr. Rivera recs following, s/p thiamine 3 doses  - will check INR daily     ----------------------------------------Endo--------------------------------  -HgbA1c 4  -Fs q6h    ----------------------------------------Prophylaxis----------------------------  DVT: heparin sq 5000 q12 hrs  GI: PPI    ---------------------------------------GOC---------------------------  -DNR   -Pt with multi organ failure secondary to septic shock with poor prognosis  -Palliative is on board  -family discussion next week

## 2020-11-07 NOTE — PROGRESS NOTE ADULT - ASSESSMENT
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  BC grew bacteroides.    Problem/Recommendation - 1:  Problem: Bacteremia. Recommendation: bacteroides  most likely GI origin  on antibiotics  she had hypothermia and hypotension and elevated lactic acid  in ICU now  multiorgan failure but improving  christel is trending up again, cholestasis, etiology?  Cr continue to trend up may be due to poor perfusion  Problem/Recommendation - 2:·  Problem: Anemia.  Recommendation: ferritin, b12 folate normalno hemolysis  most likley due to malnutrition from short bowel syndrome.she also had GIB multiple times before. Problem/Recommendation - 3:·  Problem: Acute deep vein thrombosis (DVT) of other vein of upper extremity.  Recommendation: she has been on xarelto for 2 years.  DVT at left arm and left leg before.  in all CT scan i did not see report of IVC or hepatic vein thrombosis  off xarelto and lovenox due to elevated PT/PTT. elevated PT/PTT due to malnutrition and antibiotics causing VITK def  now it is mostly recovered  it begins to rise again, correctable by mixing study  probably due to liver failure this time  she develops DVT very quickly while off AC  need DVT prophylaxis, sq heparin  5. thrombocytopenia  new onset, most likely from sepsis or medication, procal is rising  the other possibilties causing multiorgan failure, including thrombocytopenia,  such as autoimmune, vasculitis, HLH, catastrophic APS(less likely)  platelet is trending up, 150 today  6. CHF, new onset global hypokinesia,   cardiomyopathy, ?etiology  will give thiamin

## 2020-11-07 NOTE — PROGRESS NOTE ADULT - SUBJECTIVE AND OBJECTIVE BOX
INTERVAL HPI/OVERNIGHT EVENTS: No active issue overnight     Pressors levophed 0.01mcg  Vent: 24/375/40/5    ANTIBIOTICS:                      Antimicrobial:  levoFLOXacin IVPB 500 milliGRAM(s) IV Intermittent every 24 hours  rifAXIMin 550 milliGRAM(s) Oral two times a day    Cardiovascular:  midodrine. 10 milliGRAM(s) Oral three times a day  norepinephrine Infusion 0.05 MICROgram(s)/kG/Min IV Continuous <Continuous>    Pulmonary:    Hematalogic:  heparin   Injectable 5000 Unit(s) SubCutaneous every 12 hours    Other:  albumin human 25% IVPB 100 milliLiter(s) IV Intermittent every 6 hours  chlorhexidine 0.12% Liquid 15 milliLiter(s) Oral Mucosa every 12 hours  chlorhexidine 2% Cloths 1 Application(s) Topical daily  dexMEDEtomidine Infusion 0.2 MICROgram(s)/kG/Hr IV Continuous <Continuous>  lactulose Syrup 10 Gram(s) Oral daily  nystatin Powder 1 Application(s) Topical two times a day  pantoprazole  Injectable 40 milliGRAM(s) IV Push two times a day  sodium chloride 0.9% lock flush 10 milliLiter(s) IV Push every 1 hour PRN    albumin human 25% IVPB 100 milliLiter(s) IV Intermittent every 6 hours  chlorhexidine 0.12% Liquid 15 milliLiter(s) Oral Mucosa every 12 hours  chlorhexidine 2% Cloths 1 Application(s) Topical daily  dexMEDEtomidine Infusion 0.2 MICROgram(s)/kG/Hr IV Continuous <Continuous>  heparin   Injectable 5000 Unit(s) SubCutaneous every 12 hours  lactulose Syrup 10 Gram(s) Oral daily  levoFLOXacin IVPB 500 milliGRAM(s) IV Intermittent every 24 hours  midodrine. 10 milliGRAM(s) Oral three times a day  norepinephrine Infusion 0.05 MICROgram(s)/kG/Min IV Continuous <Continuous>  nystatin Powder 1 Application(s) Topical two times a day  pantoprazole  Injectable 40 milliGRAM(s) IV Push two times a day  rifAXIMin 550 milliGRAM(s) Oral two times a day  sodium chloride 0.9% lock flush 10 milliLiter(s) IV Push every 1 hour PRN    Drug Dosing Weight  Height (cm): 167.6 (21 Oct 2020 02:26)  Weight (kg): 56.2 (21 Oct 2020 02:26)  BMI (kg/m2): 20 (21 Oct 2020 02:26)  BSA (m2): 1.63 (21 Oct 2020 02:26)    CENTRAL LINE: [ ] YES [ ] NO  LOCATION:   DATE INSERTED:  REMOVE: [ ] YES [ ] NO  EXPLAIN:    TREJO: [ ] YES [ ] NO    DATE INSERTED:  REMOVE:  [ ] YES [ ] NO  EXPLAIN:    A-LINE:  [ ] YES [ ] NO  LOCATION:   DATE INSERTED:  REMOVE:  [ ] YES [ ] NO  EXPLAIN:    PMH -reviewed admission note, no change since admission    ICU Vital Signs Last 24 Hrs  T(C): 37.6 (06 Nov 2020 23:15), Max: 37.6 (06 Nov 2020 23:15)  T(F): 99.6 (06 Nov 2020 23:15), Max: 99.6 (06 Nov 2020 23:15)  HR: 82 (07 Nov 2020 02:15) (76 - 101)  BP: 100/58 (07 Nov 2020 02:15) (88/52 - 123/79)  BP(mean): 67 (07 Nov 2020 02:15) (59 - 90)  ABP: --  ABP(mean): --  RR: 24 (07 Nov 2020 02:15) (16 - 32)  SpO2: 100% (07 Nov 2020 02:15) (97% - 100%)      ABG - ( 06 Nov 2020 04:23 )  pH, Arterial: 7.40  pH, Blood: x     /  pCO2: 38    /  pO2: 132   / HCO3: 23    / Base Excess: -0.9  /  SaO2: 99                    11-05 @ 07:01  -  11-06 @ 07:00  --------------------------------------------------------  IN: 720.4 mL / OUT: 2610 mL / NET: -1889.6 mL        Mode: AC/ CMV (Assist Control/ Continuous Mandatory Ventilation)  RR (machine): 24  TV (machine): 375  FiO2: 40  PEEP: 5  ITime: 1  MAP: 11  PIP: 26      PHYSICAL EXAM:    GENERAL: NAD, well-groomed, well-developed  HEAD:  Atraumatic, Normocephalic  EYES: EOMI, PERRLA, conjunctiva and sclera clear  ENMT: No tonsillar erythema, exudates, or enlargement; Moist mucous membranes, Good dentition, No lesions  NECK: Supple, normal appearance, No JVD; Normal thyroid; Trachea midline  NERVOUS SYSTEM:  Alert & Oriented X3, Good concentration; Motor Strength 5/5 B/L upper and lower extremities; DTRs 2+ intact and symmetric  CHEST/LUNG: No chest deformity; Normal percussion bilaterally; No rales, rhonchi, wheezing   HEART: Regular rate and rhythm; No murmurs, rubs, or gallops  ABDOMEN: Soft, Nontender, Nondistended; Bowel sounds present  EXTREMITIES:  2+ Peripheral Pulses, No clubbing, cyanosis, or edema  LYMPH: No lymphadenopathy noted  SKIN: No rashes or lesions; Good capillary refill      LABS:  CBC Full  -  ( 06 Nov 2020 06:20 )  WBC Count : 8.70 K/uL  RBC Count : 2.44 M/uL  Hemoglobin : 7.6 g/dL  Hematocrit : 21.9 %  Platelet Count - Automated : 152 K/uL  Mean Cell Volume : 89.8 fl  Mean Cell Hemoglobin : 31.1 pg  Mean Cell Hemoglobin Concentration : 34.7 gm/dL  Auto Neutrophil # : 6.93 K/uL  Auto Lymphocyte # : 0.64 K/uL  Auto Monocyte # : 0.96 K/uL  Auto Eosinophil # : 0.07 K/uL  Auto Basophil # : 0.04 K/uL  Auto Neutrophil % : 79.6 %  Auto Lymphocyte % : 7.4 %  Auto Monocyte % : 11.0 %  Auto Eosinophil % : 0.8 %  Auto Basophil % : 0.5 %    11-06    138  |  104  |  37<H>  ----------------------------<  81  3.9   |  25  |  2.37<H>    Ca    8.1<L>      06 Nov 2020 06:20  Phos  3.4     11-06  Mg     2.0     11-06    TPro  5.1<L>  /  Alb  2.8<L>  /  TBili  9.2<H>  /  DBili  x   /  AST  233<H>  /  ALT  99<H>  /  AlkPhos  96  11-06            RADIOLOGY & ADDITIONAL STUDIES REVIEWED:  ***    GOALS OF CARE DISCUSSION WITH PATIENT/FAMILY/PROXY:    CRITICAL CARE TIME SPENT: 35 minutes INTERVAL HPI/OVERNIGHT EVENTS: No active issue overnight . Intubated and sedated.     Pressors levophed 0.01mcg  Vent: 24/375/40/5    ANTIBIOTICS:                      Antimicrobial:  levoFLOXacin IVPB 500 milliGRAM(s) IV Intermittent every 24 hours  rifAXIMin 550 milliGRAM(s) Oral two times a day    Cardiovascular:  midodrine. 10 milliGRAM(s) Oral three times a day  norepinephrine Infusion 0.05 MICROgram(s)/kG/Min IV Continuous <Continuous>    Pulmonary:    Hematalogic:  heparin   Injectable 5000 Unit(s) SubCutaneous every 12 hours    Other:  albumin human 25% IVPB 100 milliLiter(s) IV Intermittent every 6 hours  chlorhexidine 0.12% Liquid 15 milliLiter(s) Oral Mucosa every 12 hours  chlorhexidine 2% Cloths 1 Application(s) Topical daily  dexMEDEtomidine Infusion 0.2 MICROgram(s)/kG/Hr IV Continuous <Continuous>  lactulose Syrup 10 Gram(s) Oral daily  nystatin Powder 1 Application(s) Topical two times a day  pantoprazole  Injectable 40 milliGRAM(s) IV Push two times a day  sodium chloride 0.9% lock flush 10 milliLiter(s) IV Push every 1 hour PRN    albumin human 25% IVPB 100 milliLiter(s) IV Intermittent every 6 hours  chlorhexidine 0.12% Liquid 15 milliLiter(s) Oral Mucosa every 12 hours  chlorhexidine 2% Cloths 1 Application(s) Topical daily  dexMEDEtomidine Infusion 0.2 MICROgram(s)/kG/Hr IV Continuous <Continuous>  heparin   Injectable 5000 Unit(s) SubCutaneous every 12 hours  lactulose Syrup 10 Gram(s) Oral daily  levoFLOXacin IVPB 500 milliGRAM(s) IV Intermittent every 24 hours  midodrine. 10 milliGRAM(s) Oral three times a day  norepinephrine Infusion 0.05 MICROgram(s)/kG/Min IV Continuous <Continuous>  nystatin Powder 1 Application(s) Topical two times a day  pantoprazole  Injectable 40 milliGRAM(s) IV Push two times a day  rifAXIMin 550 milliGRAM(s) Oral two times a day  sodium chloride 0.9% lock flush 10 milliLiter(s) IV Push every 1 hour PRN    Drug Dosing Weight  Height (cm): 167.6 (21 Oct 2020 02:26)  Weight (kg): 56.2 (21 Oct 2020 02:26)  BMI (kg/m2): 20 (21 Oct 2020 02:26)  BSA (m2): 1.63 (21 Oct 2020 02:26)    CENTRAL LINE: [ ] YES [ ] NO  LOCATION:   DATE INSERTED:  REMOVE: [ ] YES [ ] NO  EXPLAIN:    TREJO: [ ] YES [ ] NO    DATE INSERTED:  REMOVE:  [ ] YES [ ] NO  EXPLAIN:    A-LINE:  [ ] YES [ ] NO  LOCATION:   DATE INSERTED:  REMOVE:  [ ] YES [ ] NO  EXPLAIN:    PMH -reviewed admission note, no change since admission    ICU Vital Signs Last 24 Hrs  T(C): 37.6 (06 Nov 2020 23:15), Max: 37.6 (06 Nov 2020 23:15)  T(F): 99.6 (06 Nov 2020 23:15), Max: 99.6 (06 Nov 2020 23:15)  HR: 82 (07 Nov 2020 02:15) (76 - 101)  BP: 100/58 (07 Nov 2020 02:15) (88/52 - 123/79)  BP(mean): 67 (07 Nov 2020 02:15) (59 - 90)  ABP: --  ABP(mean): --  RR: 24 (07 Nov 2020 02:15) (16 - 32)  SpO2: 100% (07 Nov 2020 02:15) (97% - 100%)      ABG - ( 06 Nov 2020 04:23 )  pH, Arterial: 7.40  pH, Blood: x     /  pCO2: 38    /  pO2: 132   / HCO3: 23    / Base Excess: -0.9  /  SaO2: 99                    11-05 @ 07:01  -  11-06 @ 07:00  --------------------------------------------------------  IN: 720.4 mL / OUT: 2610 mL / NET: -1889.6 mL        Mode: AC/ CMV (Assist Control/ Continuous Mandatory Ventilation)  RR (machine): 24  TV (machine): 375  FiO2: 40  PEEP: 5  ITime: 1  MAP: 11  PIP: 26      PHYSICAL EXAM:    GENERAL: Intubated and sedated + ETT  HEAD:  Atraumatic, Normocephalic  EYES: b/l pupil reactive to light.   NECK: Supple, normal appearance, No JVD; Normal thyroid; Trachea midline  NERVOUS SYSTEM:  Alert & Oriented X0  CHEST/LUNG: equal b/l air entry and rhonchi on auscultation  HEART: Regular rate and rhythm; No murmurs, rubs, or gallops  ABDOMEN: Soft, Nontender, mildly distended; Bowel sounds present  EXTREMITIES:  2+ Peripheral Pulses, No clubbing, cyanosis, or edema  LYMPH: No lymphadenopathy noted  SKIN: No rashes or lesions; Good capillary refill    LABS:  CBC Full  -  ( 06 Nov 2020 06:20 )  WBC Count : 8.70 K/uL  RBC Count : 2.44 M/uL  Hemoglobin : 7.6 g/dL  Hematocrit : 21.9 %  Platelet Count - Automated : 152 K/uL  Mean Cell Volume : 89.8 fl  Mean Cell Hemoglobin : 31.1 pg  Mean Cell Hemoglobin Concentration : 34.7 gm/dL  Auto Neutrophil # : 6.93 K/uL  Auto Lymphocyte # : 0.64 K/uL  Auto Monocyte # : 0.96 K/uL  Auto Eosinophil # : 0.07 K/uL  Auto Basophil # : 0.04 K/uL  Auto Neutrophil % : 79.6 %  Auto Lymphocyte % : 7.4 %  Auto Monocyte % : 11.0 %  Auto Eosinophil % : 0.8 %  Auto Basophil % : 0.5 %    11-06    138  |  104  |  37<H>  ----------------------------<  81  3.9   |  25  |  2.37<H>    Ca    8.1<L>      06 Nov 2020 06:20  Phos  3.4     11-06  Mg     2.0     11-06    TPro  5.1<L>  /  Alb  2.8<L>  /  TBili  9.2<H>  /  DBili  x   /  AST  233<H>  /  ALT  99<H>  /  AlkPhos  96  11-06            RADIOLOGY & ADDITIONAL STUDIES REVIEWED:  ***    GOALS OF CARE DISCUSSION WITH PATIENT/FAMILY/PROXY:    CRITICAL CARE TIME SPENT: 35 minutes

## 2020-11-07 NOTE — PROGRESS NOTE ADULT - ASSESSMENT
as per icu     65y Female from home, lives with daughter, ambulates with walker with PMH of recurrent SBO's, s/p exp lap, SB resection in 2015, ex lap, ALBINA in 2018, DVT, PE, on Xarelto, IVC filter, chronic leg swelling, and anasarca, came in to the ED due to hypotension during office visit. Patient was found to be bacteremic with bacteroids fragilis. Admitted in ICU due to hypotension and hypothermia. On vancomycin and levo . BCx X2 negative. Sputum positive for MRSA and Stenotrophomonas.     Diagnosis:  >Encephalopathy  >Sepsis  >Bacteremia  >Anemia  >DVT/PE  >Transaminitis  >Aspiration pneumonia    --------------------------------------------Neuro--------------------------------------  -She is AAOx2 at baseline on evaluation  -Encephalopathic and got intubated on 10/24  -Ammonia trending down from 152 > 130 > 116 >103>131>126>90>77----> less than 10  -On lactulose 30g daily and rifaximin, goal BM 2-3   -INR>1.63>1.71>1.91>1.98> 1.44  -Pt with multiorgan failure secondary to septic shock  -Hold CT head for now as AMS was most likely due to hepatic encephalopathy and pt didn't have any focal neurological deficit when she started having AMS  -her pupils are reactive to light b/l, patient can follow commands   - started on small dose of Precedex for tachypnea  -Off to Precedex , RR 20      -------------------------------------CVS-------------------------------  -Patient is hypotensive secondary septic shock  - patients started on levophed   -On abx Levo renally dosed , Vancomycin stopped for high vanc trough ,  -NG tube feeding  -Midodrine 10 mg TID  -RIJ for pressors  -Lasix PRN for improving UO  -Pt is getting lasix and albumin on and off   -previous ECHO 3-4 months back showed normal EF but new ECHO showed EF 15-20% with severe left ventricular dysfunction  -pt with multiorgan dysfunction sec to septic shock , mixed venous gas from RIJ showed borderline low O2 saturation , mixed picture of septic /cardiogenic shock   - anticardiolipin antibodies IgG IgM , ANCA  -Anticardiolipin abs +ve      --------------------------------Respiratory----------------------  - failed sbt   -Intubated and sedated on levophed for hypotesnion  - s/p lavage and suction for left sided mucus plaque , with following re expansion   -CT showed mild b/l pleural effusion and left lower lobe consolidation in ICU on day 2  -aspiration pneumonia  - sputum culture grew MRSA and Stenotrophomonas (sensitive to levo)  - will c/w levaquin 500 q24 hours , stopped vancomycin given toxic level  -given one dose of vanc 500 overnight   -will obtain vanc trough in am  -repeat sputum culture neg  -ID Dr. Patricio    ----------------------------------------Gastrointestinal--------------------------------------  -Patient was bacteremic with bacteroids fragilis, likely secondary from intra abdominal source.   -Patient had multiple SBO in past. 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 Likely due to sepsis vs hepatic failure sec to budd Chiari  -BCx X 2 negative.  -ammonia trends down from 150 to 130 >116>103>131>126>90> 77,--<10 on lactulose and rifaximin   -monitor BM, titrate lactulose top 2-3 BM qd   -GI, DR Chatterjee deferred doing colonoscopy due to multiple comorbidities, FOBT positive on 10/15  -Dr Katlyn aguilar has been noted,    -----------------------------------------ID---------------------------------------  -Patient was bacteremic with bacteroids likely GI source.  -Lactate is normal 1.3 but trends up to 2.3 >3.7  -Procalcitonin 0.79  -On levoquin and held vanc due to high trough ,   - BP on the lower side, pt is on Levophed baby dose and midodrine 10mg TID  -Monitor vitals Q4      ---------------------------------------------Nephro-------------------------------------  - hypernatremia improved   - on free water 120 ml q6h  -TF  - Creatinine trended down slightly today , will avoid nephrotoxin , patient in MOF   -Monitor UO,   - UO improved after Lasix trail     ----------------------------------------Hem Onc----------------------------------  -Has h/o DVt and PE in past. Patient is on Xarelto at home  -Patient has anemia  -anemia of chronic disease  -Hgb 6.8>1PRBC>8.2> 7.9>7.5>7.4>7.6  -plt trended up to 96, no active bleeding  - will start on prophylactic dose Ac Heparin 5000 q12 hrs per dr rivera recommendation   -Folate and B12 normal.   -Dr. Rivera recs following, s/p thiamine 3 doses  - will check INR daily     ----------------------------------------Endo--------------------------------  -HgbA1c 4  -Fs q6h    ----------------------------------------Prophylaxis----------------------------  DVT: heparin sq 5000 q12 hrs  GI: PPI    ---------------------------------------GOC---------------------------  -DNR   -Pt with multi organ failure secondary to septic shock with poor prognosis  -Palliative is on board  -family discussion next week

## 2020-11-07 NOTE — PROGRESS NOTE ADULT - SUBJECTIVE AND OBJECTIVE BOX
Patient is seen and examined at the bed side, remains afebrile and on pressor.  The vancomycin level is therapeutic, 19.9.      REVIEW OF SYSTEMS: Unable to obtain due to mental status         ALLERGIES: No Known Allergies      ICU Vital Signs Last 24 Hrs  T(C): 36.3 (07 Nov 2020 15:45), Max: 37.8 (07 Nov 2020 05:30)  T(F): 97.4 (07 Nov 2020 15:45), Max: 100 (07 Nov 2020 05:30)  HR: 85 (07 Nov 2020 17:00) (74 - 89)  BP: 113/66 (07 Nov 2020 17:00) (88/52 - 113/66)  BP(mean): 76 (07 Nov 2020 17:00) (59 - 81)  ABP: --  ABP(mean): --  RR: 24 (07 Nov 2020 17:00) (16 - 30)  SpO2: 100% (07 Nov 2020 17:00) (99% - 100%)          PHYSICAL EXAM:  GENERAL: Intubated /vented   CHEST/LUNG: Not using accessory muscles   HEART: s1 and s2 present  ABDOMEN:  Nontender and  Nondistended  EXTREMITIES: B/L UE edematous  CNS: Intubated/vented, awake          LABS:                        6.7    7.74  )-----------( 134      ( 07 Nov 2020 11:08 )             19.7                           7.6    8.70  )-----------( 152      ( 06 Nov 2020 06:20 )             21.9                     11-07    141  |  107  |  34<H>  ----------------------------<  111<H>  3.5   |  25  |  2.21<H>    Ca    8.1<L>      07 Nov 2020 07:08  Phos  2.8     11-07  Mg     1.9     11-07    TPro  5.2<L>  /  Alb  3.2<L>  /  TBili  8.2<H>  /  DBili  x   /  AST  146<H>  /  ALT  67<H>  /  AlkPhos  85  11-07    11-06    138  |  104  |  37<H>  ----------------------------<  81  3.9   |  25  |  2.37<H>    Ca    8.1<L>      06 Nov 2020 06:20  Phos  3.4     11-06  Mg     2.0     11-06    TPro  5.1<L>  /  Alb  2.8<L>  /  TBili  9.2<H>  /  DBili  x   /  AST  233<H>  /  ALT  99<H>  /  AlkPhos  96  11-06        Vancomycin Level, Trough (11.07.20 @ 07:08)   Vancomycin Level, Trough: 19.5    vanVancomycin Level, Trough (11.06.20 @ 06:20)   Vancomycin Level, Trough: 19.9:     Vancomycin Level, Trough (11.04.20 @ 06:12)   Vancomycin Level, Trough: 31.0:     Vancomycin Level, Trough (11.03.20 @ 04:11)   Vancomycin Level, Trough: 16.1:    Vancomycin Level, Trough (11.01.20 @ 04:59)   Vancomycin Level, Trough: 20.9:      Procalcitonin, Serum (10.15.20 @ 11:48)   Procalcitonin, Serum: 0.16: Procalcitonin (PCT) Interpretation (ng/mL) - Diagnosis of systemic   bacterial infection/sepsis         MEDICATIONS  (STANDING):    chlorhexidine 0.12% Liquid 15 milliLiter(s) Oral Mucosa every 12 hours  chlorhexidine 2% Cloths 1 Application(s) Topical daily  dexMEDEtomidine Infusion 0.2 MICROgram(s)/kG/Hr (2.81 mL/Hr) IV Continuous <Continuous>  furosemide   Injectable 80 milliGRAM(s) IV Push every 12 hours  heparin   Injectable 5000 Unit(s) SubCutaneous every 12 hours  lactulose Syrup 10 Gram(s) Oral daily  levoFLOXacin IVPB      midodrine. 10 milliGRAM(s) Oral three times a day  norepinephrine Infusion 0.05 MICROgram(s)/kG/Min (2.63 mL/Hr) IV Continuous <Continuous>  nystatin Powder 1 Application(s) Topical two times a day  pantoprazole  Injectable 40 milliGRAM(s) IV Push two times a day  rifAXIMin 550 milliGRAM(s) Oral two times a day              RADIOLOGY & ADDITIONAL TESTS:    11/4/20 : Xray Chest 1 View- PORTABLE-Routine (Xray Chest 1 View- PORTABLE-Routine in AM.) (11.04.20 @ 10:59) Reexpansion of the left lung with residual left pleural effusion and/or infiltrate.  Right pulmonary edema unchanged.  Tubes and catheters in satisfactory position.      10/25/20: Xray Chest 1 View- PORTABLE-Routine (Xray Chest 1 View- PORTABLE-Routine in AM.) (10.25.20 @ 09:21) Frontal expiratory view of the chest shows the heart to be similar in size. Endotracheal tube, right jugular line and feeding tube remain present.    The lungs show similar left upper lobe infiltrate with progression of right perihilar infiltrate. Pleural effusions are similar. There is no evidence of pneumothorax.      10/23/20 : Xray Chest 1 View-PORTABLE IMMEDIATE (Xray Chest 1 View-PORTABLE IMMEDIATE .) (10.23.20 @ 19:09) Feeding tube tip near GE junction as noted. Questionable right upper lobe infiltrate. Follow-up study is recommended as clinically warranted.      10/21/20 : CT Abdomen and Pelvis w/ Oral Cont and w/ IV Cont (10.21.20 @ 15:59) Mural thickening of the left colon and rectum. Liquid stool in the colon. Apparent segmental mural thickening of the mid to distal small bowel and the proximal duodenum. Findings may represent nonspecific enterocolitis, noninfectious inflammatory bowel disease, or ischemic bowel. Clinical correlation is recommended. The celiac axis artery, SMA, and KINA are patent without stenosis.    Dilatation of the mid small bowel is again noted. Oral contrast has reached the terminal ileum. Findings may represent small bowel obstruction, or ileus related to nonspecific enterocolitis.   Cholelithiasis.    Moderate to large ascites in the abdomen, increased since the previous examination. 5 mm nonspecific noncalcified left upper lobe lung nodule; if the patient's is in the high risk category (i.e. smoker), follow-up chest CT may be pursued in 12 months to ensure stability.    Combination of atelectasis and consolidation in the left lower lobe.    Possible 1.0 cm hypodense lesion in the left lobe of the thyroid. Thyroid ultrasound may be pursued for further evaluation.   Mild bilateral pleural effusions.    Aging determinate compression fracture at T5 vertebra.        10/13/20 : CT Abdomen and Pelvis w/ IV Cont (10.13.20 @ 18:50) Diffuse dilatation of small bowel loops without a clear transition is slightly increased, likely an ileus.  Decreased moderate ascites.    1.5 cm pancreatic versus peripancreatic soft tissue nodule    10/13/20 : Xray Chest 1 View- PORTABLE-Urgent (Xray Chest 1 View- PORTABLE-Urgent .) (10.13.20 @ 16:34) Clear lungs.          MICROBIOLOGY DATA:    Culture - Sputum . (10.26.20 @ 00:26)   - Ampicillin/Sulbactam: R <=8/4   - Cefazolin: R >16   - Ceftazidime: I 16   - Clindamycin: R >4   - Erythromycin: R >4   - Gentamicin: S <=1 Should not be used as monotherapy   - Levofloxacin: S <=0.5   - Linezolid: S 2   - Oxacillin: R >2   - Penicillin: R >8   - RIF- Rifampin: S <=1 Should not be used as monotherapy   - Tetra/Doxy: S <=1   - Trimethoprim/Sulfamethoxazole: S <=0.5/9.5   - Trimethoprim/Sulfamethoxazole: S <=0.5/9.5   - Vancomycin: S 1       MRSA/MSSA PCR (10.21.20 @ 09:41)   MRSA PCR Result.: Detected:       Culture - Blood (10.20.20 @ 22:09)   Specimen Source: .Blood Blood   Culture Results:  No growth to date.     Culture - Blood (10.20.20 @ 22:09)   Specimen Source: .Blood Blood   Culture Results:   No growth to date.     Culture - Blood in AM (10.16.20 @ 10:13)   Specimen Source: .Blood Blood-Peripheral   Culture Results: No growth to date.     Culture - Blood in AM (10.16.20 @ 10:13)   Specimen Source: .Blood Blood-Peripheral   Culture Results: No growth to date.     Culture - Blood (10.13.20 @ 22:23)   Growth in anaerobic bottle: Gram Negative Rods   Specimen Source: .Blood Blood-Peripheral   Organism: Blood Culture PCR   Culture Results: Growth in anaerobic bottle: Bacteroides fragilis   "Susceptibilities not performed"     Culture - Blood (10.13.20 @ 22:23)   Specimen Source: .Blood Blood-Peripheral   Culture Results:   No growth to date.            Patient is seen and examined at the bed side, remains afebrile and off pressor.  The vancomycin level is therapeutic, 19.9. The edema of extremities have improved. The kidney function is improving slowly.       REVIEW OF SYSTEMS: Unable to obtain due to mental status         ALLERGIES: No Known Allergies      ICU Vital Signs Last 24 Hrs  T(C): 36.3 (07 Nov 2020 15:45), Max: 37.8 (07 Nov 2020 05:30)  T(F): 97.4 (07 Nov 2020 15:45), Max: 100 (07 Nov 2020 05:30)  HR: 85 (07 Nov 2020 17:00) (74 - 89)  BP: 113/66 (07 Nov 2020 17:00) (88/52 - 113/66)  BP(mean): 76 (07 Nov 2020 17:00) (59 - 81)  ABP: --  ABP(mean): --  RR: 24 (07 Nov 2020 17:00) (16 - 30)  SpO2: 100% (07 Nov 2020 17:00) (99% - 100%)        PHYSICAL EXAM:  GENERAL: Intubated /vented   CHEST/LUNG: Not using accessory muscles   HEART: s1 and s2 present  ABDOMEN:  Nontender and  Nondistended  EXTREMITIES: B/L UE edematous  CNS: Intubated/vented, awake          LABS:                        6.7    7.74  )-----------( 134      ( 07 Nov 2020 11:08 )             19.7                           7.6    8.70  )-----------( 152      ( 06 Nov 2020 06:20 )             21.9                     11-07    141  |  107  |  34<H>  ----------------------------<  111<H>  3.5   |  25  |  2.21<H>    Ca    8.1<L>      07 Nov 2020 07:08  Phos  2.8     11-07  Mg     1.9     11-07    TPro  5.2<L>  /  Alb  3.2<L>  /  TBili  8.2<H>  /  DBili  x   /  AST  146<H>  /  ALT  67<H>  /  AlkPhos  85  11-07 11-06    138  |  104  |  37<H>  ----------------------------<  81  3.9   |  25  |  2.37<H>    Ca    8.1<L>      06 Nov 2020 06:20  Phos  3.4     11-06  Mg     2.0     11-06    TPro  5.1<L>  /  Alb  2.8<L>  /  TBili  9.2<H>  /  DBili  x   /  AST  233<H>  /  ALT  99<H>  /  AlkPhos  96  11-06        Vancomycin Level, Trough (11.07.20 @ 07:08)   Vancomycin Level, Trough: 19.5    vanVancomycin Level, Trough (11.06.20 @ 06:20)   Vancomycin Level, Trough: 19.9:     Vancomycin Level, Trough (11.04.20 @ 06:12)   Vancomycin Level, Trough: 31.0:     Vancomycin Level, Trough (11.03.20 @ 04:11)   Vancomycin Level, Trough: 16.1:    Vancomycin Level, Trough (11.01.20 @ 04:59)   Vancomycin Level, Trough: 20.9:      Procalcitonin, Serum (10.15.20 @ 11:48)   Procalcitonin, Serum: 0.16: Procalcitonin (PCT) Interpretation (ng/mL) - Diagnosis of systemic   bacterial infection/sepsis         MEDICATIONS  (STANDING):    chlorhexidine 0.12% Liquid 15 milliLiter(s) Oral Mucosa every 12 hours  chlorhexidine 2% Cloths 1 Application(s) Topical daily  dexMEDEtomidine Infusion 0.2 MICROgram(s)/kG/Hr (2.81 mL/Hr) IV Continuous <Continuous>  furosemide   Injectable 80 milliGRAM(s) IV Push every 12 hours  heparin   Injectable 5000 Unit(s) SubCutaneous every 12 hours  lactulose Syrup 10 Gram(s) Oral daily  levoFLOXacin IVPB      midodrine. 10 milliGRAM(s) Oral three times a day  norepinephrine Infusion 0.05 MICROgram(s)/kG/Min (2.63 mL/Hr) IV Continuous <Continuous>  nystatin Powder 1 Application(s) Topical two times a day  pantoprazole  Injectable 40 milliGRAM(s) IV Push two times a day  rifAXIMin 550 milliGRAM(s) Oral two times a day          RADIOLOGY & ADDITIONAL TESTS:    11/4/20 : Xray Chest 1 View- PORTABLE-Routine (Xray Chest 1 View- PORTABLE-Routine in AM.) (11.04.20 @ 10:59) Reexpansion of the left lung with residual left pleural effusion and/or infiltrate.  Right pulmonary edema unchanged.  Tubes and catheters in satisfactory position.      10/25/20: Xray Chest 1 View- PORTABLE-Routine (Xray Chest 1 View- PORTABLE-Routine in AM.) (10.25.20 @ 09:21) Frontal expiratory view of the chest shows the heart to be similar in size. Endotracheal tube, right jugular line and feeding tube remain present.    The lungs show similar left upper lobe infiltrate with progression of right perihilar infiltrate. Pleural effusions are similar. There is no evidence of pneumothorax.      10/23/20 : Xray Chest 1 View-PORTABLE IMMEDIATE (Xray Chest 1 View-PORTABLE IMMEDIATE .) (10.23.20 @ 19:09) Feeding tube tip near GE junction as noted. Questionable right upper lobe infiltrate. Follow-up study is recommended as clinically warranted.      10/21/20 : CT Abdomen and Pelvis w/ Oral Cont and w/ IV Cont (10.21.20 @ 15:59) Mural thickening of the left colon and rectum. Liquid stool in the colon. Apparent segmental mural thickening of the mid to distal small bowel and the proximal duodenum. Findings may represent nonspecific enterocolitis, noninfectious inflammatory bowel disease, or ischemic bowel. Clinical correlation is recommended. The celiac axis artery, SMA, and KINA are patent without stenosis.    Dilatation of the mid small bowel is again noted. Oral contrast has reached the terminal ileum. Findings may represent small bowel obstruction, or ileus related to nonspecific enterocolitis.   Cholelithiasis.    Moderate to large ascites in the abdomen, increased since the previous examination. 5 mm nonspecific noncalcified left upper lobe lung nodule; if the patient's is in the high risk category (i.e. smoker), follow-up chest CT may be pursued in 12 months to ensure stability.    Combination of atelectasis and consolidation in the left lower lobe.    Possible 1.0 cm hypodense lesion in the left lobe of the thyroid. Thyroid ultrasound may be pursued for further evaluation.   Mild bilateral pleural effusions.    Aging determinate compression fracture at T5 vertebra.        10/13/20 : CT Abdomen and Pelvis w/ IV Cont (10.13.20 @ 18:50) Diffuse dilatation of small bowel loops without a clear transition is slightly increased, likely an ileus.  Decreased moderate ascites.    1.5 cm pancreatic versus peripancreatic soft tissue nodule    10/13/20 : Xray Chest 1 View- PORTABLE-Urgent (Xray Chest 1 View- PORTABLE-Urgent .) (10.13.20 @ 16:34) Clear lungs.          MICROBIOLOGY DATA:    Culture - Sputum . (10.26.20 @ 00:26)   - Ampicillin/Sulbactam: R <=8/4   - Cefazolin: R >16   - Ceftazidime: I 16   - Clindamycin: R >4   - Erythromycin: R >4   - Gentamicin: S <=1 Should not be used as monotherapy   - Levofloxacin: S <=0.5   - Linezolid: S 2   - Oxacillin: R >2   - Penicillin: R >8   - RIF- Rifampin: S <=1 Should not be used as monotherapy   - Tetra/Doxy: S <=1   - Trimethoprim/Sulfamethoxazole: S <=0.5/9.5   - Trimethoprim/Sulfamethoxazole: S <=0.5/9.5   - Vancomycin: S 1       MRSA/MSSA PCR (10.21.20 @ 09:41)   MRSA PCR Result.: Detected:       Culture - Blood (10.20.20 @ 22:09)   Specimen Source: .Blood Blood   Culture Results:  No growth to date.     Culture - Blood (10.20.20 @ 22:09)   Specimen Source: .Blood Blood   Culture Results:   No growth to date.     Culture - Blood in AM (10.16.20 @ 10:13)   Specimen Source: .Blood Blood-Peripheral   Culture Results: No growth to date.     Culture - Blood in AM (10.16.20 @ 10:13)   Specimen Source: .Blood Blood-Peripheral   Culture Results: No growth to date.     Culture - Blood (10.13.20 @ 22:23)   Growth in anaerobic bottle: Gram Negative Rods   Specimen Source: .Blood Blood-Peripheral   Organism: Blood Culture PCR   Culture Results: Growth in anaerobic bottle: Bacteroides fragilis   "Susceptibilities not performed"     Culture - Blood (10.13.20 @ 22:23)   Specimen Source: .Blood Blood-Peripheral   Culture Results:   No growth to date.

## 2020-11-07 NOTE — PROGRESS NOTE ADULT - ASSESSMENT
Patient is a 65y old  Female from home, lives with daughter, ambulates with walker with PMH of recurrent SBO's, s/p exp lap, SB resection in 2015, ex lap, ALBINA in 2018, DVT, PE, on Xarelto, IVC filter, chronic leg swelling, and anasarca, Now send in to the ER by her PCP, Dr. Ross, for evaluation of  generalized weakness and hypotension BP of 80/40 during office visit. On admission, she found to have no fever and BP was in lower normal range and no Leukocytosis. The CXR is clear and CT abd/pelvis consistent with Ileus. The ID consult requested to assist with evaluation of infectious etiology of episode of hypotension. Found to have Bacteroides bacteremia and now developed septic shock on Cefepime and Flagyl. Hence transferred to ICU. 10/20/20.    # Hypotension  at office- BP of 80/40 - resolved   #  Bacteroides fragilis Bacteremia - 10/13/20 - ? source most likely GI- Repeat Blood Cxs have NGTD 10/16/20  # Ileus  - h/o recurrent SBO and s/p EXp. LAp  # COVID 19 negative   # Septic shock ( Hypothermia + hypotensive)- transferred to ICU since requiring pressor- source GI, The CT abd/pelvis shows nonspecific enterocolitis, noninfectious inflammatory bowel disease, or ischemic bowel, along with ileus.   # Pneumonia- HCAP vs Aspiration - on CXR 10/24 and CT chest 10/21- sputum Cx grew Methicillin resistant Staphylococcus aureus  and Stenotrophomonas maltophilia.      would recommend:    1. Please continue Vancomycin 500 mg daily since level is therapeutic    2. Monitor kidney function and adjust Abx doses accordingly   3. Continue renally adjusted doses Levaquin to cover Stenotrophomonas  4. Aspiration precaution  5. Management of Vent as per ICU protocol  6. Keep extremities eleavted, edema is improving       d/w ICU team     Attending Attestation:    Spent more than 45 minutes on total encounter, more than 50 % of the visit was spent counseling and/or coordinating care by the Attending physician.       Patient is a 65y old  Female from home, lives with daughter, ambulates with walker with PMH of recurrent SBO's, s/p exp lap, SB resection in 2015, ex lap, ALBINA in 2018, DVT, PE, on Xarelto, IVC filter, chronic leg swelling, and anasarca, Now send in to the ER by her PCP, Dr. Ross, for evaluation of  generalized weakness and hypotension BP of 80/40 during office visit. On admission, she found to have no fever and BP was in lower normal range and no Leukocytosis. The CXR is clear and CT abd/pelvis consistent with Ileus. The ID consult requested to assist with evaluation of infectious etiology of episode of hypotension. Found to have Bacteroides bacteremia and now developed septic shock on Cefepime and Flagyl. Hence transferred to ICU. 10/20/20.    # Hypotension  at office- BP of 80/40 - resolved   #  Bacteroides fragilis Bacteremia - 10/13/20 - ? source most likely GI- Repeat Blood Cxs have NGTD 10/16/20  # Ileus  - h/o recurrent SBO and s/p EXp. LAp  # COVID 19 negative   # Septic shock ( Hypothermia + hypotensive)- transferred to ICU since requiring pressor- source GI, The CT abd/pelvis shows nonspecific enterocolitis, noninfectious inflammatory bowel disease, or ischemic bowel, along with ileus.   # Pneumonia- HCAP vs Aspiration - on CXR 10/24 and CT chest 10/21- sputum Cx grew Methicillin resistant Staphylococcus aureus  and Stenotrophomonas maltophilia.      would recommend:    1. Please continue Vancomycin 500 mg daily since level is therapeutic    2. Monitor kidney function and adjust Abx doses accordingly   3. Continue renally adjusted doses Levaquin to cover Stenotrophomonas  4. Aspiration precaution  5. Management of Vent as per ICU protocol  6. Keep extremities elevated, edema is improving       d/w ICU team     Attending Attestation:    Spent more than 45 minutes on total encounter, more than 50 % of the visit was spent counseling and/or coordinating care by the Attending physician.

## 2020-11-08 NOTE — PROGRESS NOTE ADULT - SUBJECTIVE AND OBJECTIVE BOX
Patient was seen and examined  Patient is a 65y old  Female who presents with a chief complaint of Hypotension (08 Nov 2020 01:19)      INTERVAL HPI/OVERNIGHT EVENTS:  T(C): 36.4 (11-08-20 @ 03:30), Max: 36.4 (11-07-20 @ 17:00)  HR: 80 (11-08-20 @ 08:00) (67 - 86)  BP: 111/72 (11-08-20 @ 08:00) (92/52 - 113/66)  RR: 26 (11-08-20 @ 08:00) (22 - 30)  SpO2: 100% (11-08-20 @ 08:00) (85% - 100%)  Wt(kg): --  I&O's Summary    07 Nov 2020 07:01  -  08 Nov 2020 07:00  --------------------------------------------------------  IN: 2645 mL / OUT: 2590 mL / NET: 55 mL    08 Nov 2020 07:01  -  08 Nov 2020 09:03  --------------------------------------------------------  IN: 0 mL / OUT: 280 mL / NET: -280 mL        LABS:                        8.2    6.62  )-----------( 128      ( 08 Nov 2020 03:47 )             23.3     11-08    140  |  104  |  35<H>  ----------------------------<  133<H>  2.9<LL>   |  27  |  2.17<H>    Ca    8.0<L>      08 Nov 2020 03:47  Phos  2.3     11-08  Mg     1.6     11-08    TPro  4.9<L>  /  Alb  2.7<L>  /  TBili  7.2<H>  /  DBili  x   /  AST  131<H>  /  ALT  62<H>  /  AlkPhos  87  11-08        CAPILLARY BLOOD GLUCOSE      POCT Blood Glucose.: 140 mg/dL (08 Nov 2020 05:55)  POCT Blood Glucose.: 134 mg/dL (07 Nov 2020 23:29)  POCT Blood Glucose.: 130 mg/dL (07 Nov 2020 18:27)  POCT Blood Glucose.: 122 mg/dL (07 Nov 2020 11:01)              MEDICATIONS  (STANDING):  chlorhexidine 0.12% Liquid 15 milliLiter(s) Oral Mucosa every 12 hours  chlorhexidine 2% Cloths 1 Application(s) Topical daily  furosemide   Injectable 80 milliGRAM(s) IV Push every 12 hours  heparin   Injectable 5000 Unit(s) SubCutaneous every 12 hours  lactulose Syrup 10 Gram(s) Oral daily  levoFLOXacin IVPB      magnesium sulfate  IVPB 2 Gram(s) IV Intermittent once  midodrine. 10 milliGRAM(s) Oral three times a day  nystatin Powder 1 Application(s) Topical two times a day  pantoprazole  Injectable 40 milliGRAM(s) IV Push two times a day  rifAXIMin 550 milliGRAM(s) Oral two times a day  vancomycin  IVPB 500 milliGRAM(s) IV Intermittent every 24 hours    MEDICATIONS  (PRN):  sodium chloride 0.9% lock flush 10 milliLiter(s) IV Push every 1 hour PRN Pre/post blood products, medications, blood draw, and to maintain line patency      RADIOLOGY & ADDITIONAL TESTS:    Imaging Personally Reviewed:  [ ] YES  [ ] NO    REVIEW OF SYSTEMS:  unable       Consultant(s) Notes Reviewed:  [ ] YES  [ ] NO    PHYSICAL EXAM:  GENERAL: NAD, well-groomed, well-developed  HEAD:  Atraumatic, Normocephalic  EYES: EOMI, PERRLA, conjunctiva and sclera clear  ENMT: No tonsillar erythema, exudates, or enlargement; Moist mucous membranes, Good dentition, No lesions  NECK: Supple, No JVD, Normal thyroid  NERVOUS SYSTEM:  on vent   CHEST/LUNG: Clear to percussion bilaterally; No rales, rhonchi, wheezing, or rubs  HEART: Regular rate and rhythm; No murmurs, rubs, or gallops  ABDOMEN: Soft, Nontender, Nondistended; Bowel sounds present  EXTREMITIES:  2+ Peripheral Pulses, No clubbing, cyanosis, or edema  LYMPH: No lymphadenopathy noted  SKIN: No rashes or lesions    Care Discussed with Consultants/Other Providers [ x] YES  [ ] NO

## 2020-11-08 NOTE — PROGRESS NOTE ADULT - ASSESSMENT
as per icu     65y Female from home, lives with daughter, ambulates with walker with PMH of recurrent SBO's, s/p exp lap, SB resection in 2015, ex lap, ALBINA in 2018, DVT, PE, on Xarelto, IVC filter, chronic leg swelling, and anasarca, came in to the ED due to hypotension during office visit. Patient was found to be bacteremic with bacteroids fragilis. Admitted in ICU due to hypotension and hypothermia. On vancomycin and levo . BCx X2 negative. Sputum positive for MRSA and Stenotrophomonas.     Diagnosis:  >Encephalopathy  >Sepsis  >Bacteremia  >Anemia  >DVT/PE  >Transaminitis  >Aspiration pneumonia    --------------------------------------------Neuro--------------------------------------  -She is AAOx2 at baseline on evaluation  -Encephalopathic and got intubated on 10/24  -Ammonia trending down from 152 > 130 > 116 >103>131>126>90>77----> less than 10  -On lactulose 30g daily and rifaximin, goal BM 2-3   -INR>1.63>1.71>1.91>1.98> 1.44  -Pt with multiorgan failure secondary to septic shock  -Hold CT head for now as AMS was most likely due to hepatic encephalopathy and pt didn't have any focal neurological deficit when she started having AMS  -her pupils are reactive to light b/l, patient can follow commands   - started on small dose of Precedex for tachypnea  -Off to Precedex , RR 20      -------------------------------------CVS-------------------------------  -Patient is hypotensive secondary septic shock  - patients started on levophed   -On abx Levo renally dosed , Vancomycin stopped for high vanc trough ,  -NG tube feeding  -Midodrine 10 mg TID  -RIJ for pressors  -Lasix PRN for improving UO  -Pt is getting lasix and albumin on and off   -previous ECHO 3-4 months back showed normal EF but new ECHO showed EF 15-20% with severe left ventricular dysfunction  -pt with multiorgan dysfunction sec to septic shock , mixed venous gas from [facility] showed borderline low O2 saturation , mixed picture of septic /cardiogenic shock   - anticardiolipin antibodies IgG IgM , ANCA  -Anticardiolipin abs +ve      --------------------------------Respiratory----------------------  - failed sbt   -Intubated and sedated on levophed for hypotesnion  - s/p lavage and suction for left sided mucus plaque , with following re expansion   -CT showed mild b/l pleural effusion and left lower lobe consolidation in ICU on day 2  -aspiration pneumonia  - sputum culture grew MRSA and Stenotrophomonas (sensitive to levo)  - will c/w levaquin 500 q24 hours , stopped vancomycin given toxic level  -given one dose of vanc 500 overnight   -will obtain vanc trough in am  -repeat sputum culture neg  -ID Dr. Patricio    ----------------------------------------Gastrointestinal--------------------------------------  -Patient was bacteremic with bacteroids fragilis, likely secondary from intra abdominal source.   -Patient had multiple SBO in past. 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 Likely due to sepsis vs hepatic failure sec to budd Chiari  -BCx X 2 negative.  -ammonia trends down from 150 to 130 >116>103>131>126>90> 77,--<10 on lactulose and rifaximin   -monitor BM, titrate lactulose top 2-3 BM qd   -GI, DR Chatterjee deferred doing colonoscopy due to multiple comorbidities, FOBT positive on 10/15  -Dr Katlyn aguilar has been noted,    -----------------------------------------ID---------------------------------------  -Patient was bacteremic with bacteroids likely GI source.  -Lactate is normal 1.3 but trends up to 2.3 >3.7  -Procalcitonin 0.79  -On levoquin and held vanc due to high trough ,   - BP on the lower side, pt is on Levophed baby dose and midodrine 10mg TID  -Monitor vitals Q4  - Given 1 dose Vancomycin 500mg 11/6 PM  - F/U Vanc trough 10PM    ---------------------------------------------Nephro-------------------------------------  - hypernatremia improved   - on free water 120 ml q6h  -TF  - Creatinine trended down slightly today , will avoid nephrotoxin , patient in MOF   -Monitor UO,   - UO improved after Lasix trail     ----------------------------------------Hem Onc----------------------------------  -Has h/o DVt and PE in past. Patient is on Xarelto at home  -Patient has anemia  -anemia of chronic disease  -Hgb 6.8>1PRBC>8.2> 7.9>7.5>7.4>7.6  -plt trended up to 96, no active bleeding  - will start on prophylactic dose Ac Heparin 5000 q12 hrs per dr rivera recommendation   -Folate and B12 normal.   -Dr. Rivera recs following, s/p thiamine 3 doses  - will check INR daily     ----------------------------------------Endo--------------------------------  -HgbA1c 4  -Fs q6h    ----------------------------------------Prophylaxis----------------------------  DVT: heparin sq 5000 q12 hrs  GI: PPI    ---------------------------------------GOC---------------------------  -DNR   -Pt with multi organ failure secondary to septic shock with poor prognosis  -Palliative is on board  -family discussion next week

## 2020-11-08 NOTE — PROGRESS NOTE ADULT - ASSESSMENT
Hospital course:  66 yo Female with Hx of recurrent SBO`s s/p exp lap, small bowel resection in 2015, ex lap, ALBINA in 2018, DVT, PE (was on Xarelto), IVC filter and s/p supra-hepatic IVC thrombosis/occlusion, anemia, anasarca was sent to ED by PCP for hypotension, generalized weakness and chills and was admitted on 10/13/2020 for hypotension, r/o sepsis , found to have Bacteroides fragilis bacteremia, suspected GI source, was treated with cefepime + metronidazole, remained hypothermic, hypotensive, was transferred to ICU and switched to meropenem on 10/21. Repeat CT 10/21 suggested  non-specific enterocolitis, noninfectious inflammatory bowel disease or ischemic bowel along with ileus, and concern for SBO given mild luminal narrowing at distal small bowel. Patient remained hypotensive, hypothermic, requiring pressor support, septic and became encephalopathic and got intubated on 10/24, also diagnosed with HCAP vs. aspirational PNA, sputum growing MRSA and Stenotrophomonas maltophilia.  She was switched to levofloxacin on 10/27 and now on levo plus vancomycin. 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 Liver enzymes were /are up-trending, along with bilirubin, was suspected due to ongoing sepsis. Patient also noted with cardiomyopathy with EF 10-15%. Remains intubated, but with improving mental status and now off pressor.    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 (as per d/w primary team, it was non-conclusive)  - No caudate lobe hypertrophy reported (present in 75% of Budd Chiari cases), no macroregenerative nodules reported, characteristic pattern of parenchymal perfusion not described, but study reported limited due to motion artifact   - Venography would be gold standard for diagnosis, discussed earlier with IR, if patient will be more stable (possibly outpatient), can be [facility] at [facility] for Dx and potential intervention, however this time patient remains intubated , on pressor and now with EF 10-15 %, thus not transplant candidate and not candidate for TIPS  - was already on full dose anticoagulation, was being held b/o coagulopathy and now b/o severe thrombocytopenia, AC per primary team/hem (currently on DVT ppx)  - portal vein and IVC were patent on recent imaging  - Repeat US abd was done, but was limited and no comment either on hepatic vein again or on collaterals  - anticardiolipin antibody pos 1x (prior negative 1x)     # Abnormal liver enzymes with hyperbilirubinemia and coagulopathy, now MOF  # Ascites  - Elevated liver enzymes were thought partially due to ongoing sepsis (only mildly elevated or normal on admission and started to worsen when patient condition deteriorated, became hypotensive, hypothermic; peak  / now 131, peak  / now 62; peak ALP  316 / now 87; peak Se bi 9.2 / now 7.2 )   - Possibly the combination of sepsis /MOF and also due to severely reduced LVEF (10-15%) and RV dysfunction - improving as volume status improved, although still significantly elevated, thus cannot fully rule out additional etiology  - Dx paracentesis could be useful (last 2 sets of BCx nl) but as d/w IR, no safe window based on last CT and now small ascites on recent US abdomen - US was limited, check with IR if amenable for Dx paracentesis  - Prior ascites analysis from 2019: ascites albumin< 0.2, protein < 1; serum albumin 1.0; SAAG< 1.1 (early Budd Chiari would usually give SAAG > 1.1, total protein > 2.5, and in late Budd Chiari SAAG> 1.1 and total protein < 2.5), SAAG < 1.1 with low protein could be due to bowel obstruction/infarction, serositis among others  - prior liver workup during prior admissions: KATHY neg, AMA neg, HBsAg neg, HBcAb IgM neg, HAV IgM neg)  - Given that still up-trending bilirubin, and coagulopathy, resent acute hep panel - negative, EBV - past infection, HSV IgG pos, CMV PCR neg, f/u VZV, hep E; KATHY neg, can consider rest of AI workup, however given MOF, GOC discussions are ongoing with family  - anticardiolipin IgM pos, beta 2 glycoprotein pos     # Encephalopathy, possibly multifactorial; Mental status overall improved, following commands  - c/w lactulose to titrate 2-3 BM/day and c/w rifaximin   - CT head was not done b/o lac-k of focal signs      Will continue to follow  D/w primary team  Thank you for the consult

## 2020-11-08 NOTE — PROGRESS NOTE ADULT - ASSESSMENT
65y Female from home, lives with daughter, ambulates with walker with PMH of recurrent SBO's, s/p exp lap, SB resection in 2015, ex lap, ALBINA in 2018, DVT, PE, on Xarelto, IVC filter, chronic leg swelling, and anasarca, came in to the ED due to hypotension during office visit. Patient was found to be bacteremic with bacteroids fragilis. Admitted in ICU due to hypotension and hypothermia. On vancomycin and levo . BCx X2 negative. Sputum positive for MRSA and Stenotrophomonas.     Diagnosis:  >Encephalopathy  >Sepsis  >Bacteremia  >Anemia  >DVT/PE  >Transaminitis  >Aspiration pneumonia    --------------------------------------------Neuro--------------------------------------  -She is AAOx2 at baseline on evaluation  -Encephalopathic and got intubated on 10/24  -Ammonia trending down from 152 > 130 > 116 >103>131>126>90>77----> less than 10  -On lactulose 30g daily and rifaximin, goal BM 2-3   -INR>1.63>1.71>1.91>1.98> 1.44  -Pt with multiorgan failure secondary to septic shock  -Hold CT head for now as AMS was most likely due to hepatic encephalopathy and pt didn't have any focal neurological deficit when she started having AMS  -her pupils are reactive to light b/l, patient can follow commands   - started on small dose of Precedex for tachypnea  -Off to Precedex , RR 20      -------------------------------------CVS-------------------------------  -Patient is hypotensive secondary septic shock  - patients started on levophed   -On abx Levo renally dosed , Vancomycin stopped for high vanc trough ,  -NG tube feeding  -Midodrine 10 mg TID  -RIJ for pressors  -Lasix PRN for improving UO  -Pt is getting lasix and albumin on and off   -previous ECHO 3-4 months back showed normal EF but new ECHO showed EF 15-20% with severe left ventricular dysfunction  -pt with multiorgan dysfunction sec to septic shock , mixed venous gas from [facility] showed borderline low O2 saturation , mixed picture of septic /cardiogenic shock   - anticardiolipin antibodies IgG IgM , ANCA  -Anticardiolipin abs +ve      --------------------------------Respiratory----------------------  - failed sbt   -Intubated and sedated on levophed for hypotesnion  - s/p lavage and suction for left sided mucus plaque , with following re expansion   -CT showed mild b/l pleural effusion and left lower lobe consolidation in ICU on day 2  -aspiration pneumonia  - sputum culture grew MRSA and Stenotrophomonas (sensitive to levo)  - will c/w levaquin 500 q24 hours , stopped vancomycin given toxic level  -given one dose of vanc 500 overnight   -will obtain vanc trough in am  -repeat sputum culture neg  -ID Dr. Patricio    ----------------------------------------Gastrointestinal--------------------------------------  -Patient was bacteremic with bacteroids fragilis, likely secondary from intra abdominal source.   -Patient had multiple SBO in past. 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 Likely due to sepsis vs hepatic failure sec to budd Chiari  -BCx X 2 negative.  -ammonia trends down from 150 to 130 >116>103>131>126>90> 77,--<10 on lactulose and rifaximin   -monitor BM, titrate lactulose top 2-3 BM qd   -GI, DR Chatterjee deferred doing colonoscopy due to multiple comorbidities, FOBT positive on 10/15  -Dr Katlyn aguilar has been noted,    -----------------------------------------ID---------------------------------------  -Patient was bacteremic with bacteroids likely GI source.  -Lactate is normal 1.3 but trends up to 2.3 >3.7  -Procalcitonin 0.79  -On levoquin and held vanc due to high trough ,   - BP on the lower side, pt is on Levophed baby dose and midodrine 10mg TID  -Monitor vitals Q4  - Given 1 dose Vancomycin 500mg 11/6 PM  - F/U Vanc trough 10PM    ---------------------------------------------Nephro-------------------------------------  - hypernatremia improved   - on free water 120 ml q6h  -TF  - Creatinine trended down slightly today , will avoid nephrotoxin , patient in MOF   -Monitor UO,   - UO improved after Lasix trail     ----------------------------------------Hem Onc----------------------------------  -Has h/o DVt and PE in past. Patient is on Xarelto at home  -Patient has anemia  -anemia of chronic disease  -Hgb 6.8>1PRBC>8.2> 7.9>7.5>7.4>7.6  -plt trended up to 96, no active bleeding  - will start on prophylactic dose Ac Heparin 5000 q12 hrs per dr rivera recommendation   -Folate and B12 normal.   -Dr. Rivera recs following, s/p thiamine 3 doses  - will check INR daily     ----------------------------------------Endo--------------------------------  -HgbA1c 4  -Fs q6h    ----------------------------------------Prophylaxis----------------------------  DVT: heparin sq 5000 q12 hrs  GI: PPI    ---------------------------------------GOC---------------------------  -DNR   -Pt with multi organ failure secondary to septic shock with poor prognosis  -Palliative is on board  -family discussion next week         65y Female from home, lives with daughter, ambulates with walker with PMH of recurrent SBO's, s/p exp lap, SB resection in 2015, ex lap, ALBINA in 2018, DVT, PE, on Xarelto, IVC filter, chronic leg swelling, and anasarca, came in to the ED due to hypotension during office visit. Patient was found to be bacteremic with bacteroids fragilis. Admitted in ICU due to hypotension and hypothermia. On vancomycin and levo . BCx X2 negative. Sputum positive for MRSA and Stenotrophomonas.     Diagnosis:  >Encephalopathy  >Sepsis  >Bacteremia  >Anemia  >DVT/PE  >Transaminitis  >Aspiration pneumonia    --------------------------------------------Neuro--------------------------------------  -She is AAOx2 at baseline on evaluation  -Encephalopathic and got intubated on 10/24  -Ammonia trending down from 152 > 130 > 116 >103>131>126>90>77----> less than 10  -On lactulose 30g daily and rifaximin, goal BM 2-3   -INR>1.63>1.71>1.91>1.98> 1.44  -Pt with multiorgan failure secondary to septic shock  -Hold CT head for now as AMS was most likely due to hepatic encephalopathy and pt didn't have any focal neurological deficit when she started having AMS  -her pupils are reactive to light b/l, patient can follow commands   - started on small dose of Precedex for tachypnea  -Off to Precedex , RR 20      -------------------------------------CVS-------------------------------  -Patient is hypotensive secondary septic shock  - patients started on levophed   -On abx Levo renally dosed , Vancomycin stopped for high vanc trough ,  -NG tube feeding  -Midodrine 10 mg TID  -RIJ for pressors  -Lasix PRN for improving UO  -Pt is getting lasix and albumin on and off   -previous ECHO 3-4 months back showed normal EF but new ECHO showed EF 15-20% with severe left ventricular dysfunction  -pt with multiorgan dysfunction sec to septic shock , mixed venous gas from [facility] showed borderline low O2 saturation , mixed picture of septic /cardiogenic shock   - anticardiolipin antibodies IgG IgM , ANCA  -Anticardiolipin abs +ve      --------------------------------Respiratory----------------------  - failed sbt   -Intubated and sedated on levophed for hypotesnion  - s/p lavage and suction for left sided mucus plaque , with following re expansion   -CT showed mild b/l pleural effusion and left lower lobe consolidation in ICU on day 2  -aspiration pneumonia  - sputum culture grew MRSA and Stenotrophomonas (sensitive to levo)  - will c/w levaquin 500 q24 hours ,   -given one dose of vancomyicn last night.  will repat trough before next vancomycin dose.  --repeat sputum culture neg  -ID Dr. Patricio    ----------------------------------------Gastrointestinal--------------------------------------  -Patient was bacteremic with bacteroids fragilis, likely secondary from intra abdominal source.   -Patient had multiple SBO in past. 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 Likely due to sepsis vs hepatic failure sec to budd Chiari  -BCx X 2 negative.  -ammonia trends down from 150 to 130 >116>103>131>126>90> 77,--<10 on lactulose and rifaximin   -monitor BM, titrate lactulose top 2-3 BM qd   -GI, DR Chatterjee deferred doing colonoscopy due to multiple comorbidities, FOBT positive on 10/15  -Dr Katlyn aguilar has been noted,    -----------------------------------------ID---------------------------------------  -Patient was bacteremic with bacteroids likely GI source.  -Lactate is normal 1.3 but trends up to 2.3 >3.7  -Procalcitonin 0.79  -On levoquin and held vanc due to high trough ,   - BP on the lower side, pt is on Levophed baby dose and midodrine 10mg TID  -Monitor vitals Q4  - Given 1 dose Vancomycin 500mg 11/6 PM  - F/U Vanc trough 10PM    ---------------------------------------------Nephro-------------------------------------  - hypernatremia improved   - on free water 120 ml q6h  -TF  - Creatinine trended down slightly today , will avoid nephrotoxin , patient in MOF   -Monitor UO,   - UO improved after Lasix trail     ----------------------------------------Hem Onc----------------------------------  -Has h/o DVt and PE in past. Patient is on Xarelto at home  -Patient has anemia  -anemia of chronic disease  -Hgb 6.8>1PRBC>8.2> 7.9>7.5>7.4>7.6  -plt trended up to 96, no active bleeding  - will start on prophylactic dose Ac Heparin 5000 q12 hrs per dr rivera recommendation   -Folate and B12 normal.   -Dr. Rivera recs following, s/p thiamine 3 doses  - will check INR daily     ----------------------------------------Endo--------------------------------  -HgbA1c 4  -Fs q6h    ----------------------------------------Prophylaxis----------------------------  DVT: heparin sq 5000 q12 hrs  GI: PPI    ---------------------------------------GOC---------------------------  -DNR   -Pt with multi organ failure secondary to septic shock with poor prognosis  -Palliative is on board  -family discussion next week

## 2020-11-08 NOTE — PROGRESS NOTE ADULT - SUBJECTIVE AND OBJECTIVE BOX
Patient is seen and examined at the bed side, remains afebrile and off pressor.  The vancomycin level is therapeutic, 19.9. The edema of extremities have improved. The kidney function is improving slowly.       REVIEW OF SYSTEMS: Unable to obtain due to mental status         ALLERGIES: No Known Allergies        ICU Vital Signs Last 24 Hrs  T(C): 36.2 (08 Nov 2020 12:00), Max: 36.4 (07 Nov 2020 17:00)  T(F): 97.1 (08 Nov 2020 12:00), Max: 97.6 (08 Nov 2020 03:30)  HR: 70 (08 Nov 2020 16:35) (67 - 85)  BP: 98/62 (08 Nov 2020 16:00) (92/59 - 116/76)  BP(mean): 70 (08 Nov 2020 16:00) (64 - 86)  ABP: --  ABP(mean): --  RR: 24 (08 Nov 2020 16:00) (20 - 29)  SpO2: 100% (08 Nov 2020 16:35) (85% - 100%)        PHYSICAL EXAM:  GENERAL: Intubated /vented   CHEST/LUNG: Not using accessory muscles   HEART: s1 and s2 present  ABDOMEN:  Nontender and  Nondistended  EXTREMITIES: B/L UE edematous  CNS: Intubated/vented, awake          LABS:                        8.2    6.62  )-----------( 128      ( 08 Nov 2020 03:47 )             23.3                           6.7    7.74  )-----------( 134      ( 07 Nov 2020 11:08 )             19.7            11-08    141  |  105  |  33<H>  ----------------------------<  107<H>  3.7   |  29  |  2.14<H>    Ca    8.1<L>      08 Nov 2020 14:25  Phos  3.1     11-08  Mg     2.1     11-08    TPro  4.9<L>  /  Alb  2.7<L>  /  TBili  7.2<H>  /  DBili  x   /  AST  131<H>  /  ALT  62<H>  /  AlkPhos  87  11-08      11-06    138  |  104  |  37<H>  ----------------------------<  81  3.9   |  25  |  2.37<H>    Ca    8.1<L>      06 Nov 2020 06:20  Phos  3.4     11-06  Mg     2.0     11-06    TPro  5.1<L>  /  Alb  2.8<L>  /  TBili  9.2<H>  /  DBili  x   /  AST  233<H>  /  ALT  99<H>  /  AlkPhos  96  11-06      Vancomycin Level, Trough (11.07.20 @ 21:49)   Vancomycin Level, Trough: 16.1:     Vancomycin Level, Trough (11.07.20 @ 07:08)   Vancomycin Level, Trough: 19.5    vanVancomycin Level, Trough (11.06.20 @ 06:20)   Vancomycin Level, Trough: 19.9:     Vancomycin Level, Trough (11.04.20 @ 06:12)   Vancomycin Level, Trough: 31.0:     Vancomycin Level, Trough (11.03.20 @ 04:11)   Vancomycin Level, Trough: 16.1:    Vancomycin Level, Trough (11.01.20 @ 04:59)   Vancomycin Level, Trough: 20.9:      Procalcitonin, Serum (10.15.20 @ 11:48)   Procalcitonin, Serum: 0.16: Procalcitonin (PCT) Interpretation (ng/mL) - Diagnosis of systemic   bacterial infection/sepsis         MEDICATIONS  (STANDING):    chlorhexidine 0.12% Liquid 15 milliLiter(s) Oral Mucosa every 12 hours  chlorhexidine 2% Cloths 1 Application(s) Topical daily  furosemide   Injectable 80 milliGRAM(s) IV Push every 12 hours  heparin   Injectable 5000 Unit(s) SubCutaneous every 12 hours  lactulose Syrup 10 Gram(s) Oral daily  levoFLOXacin IVPB      midodrine. 10 milliGRAM(s) Oral three times a day  nystatin Powder 1 Application(s) Topical two times a day  pantoprazole  Injectable 40 milliGRAM(s) IV Push two times a day  potassium chloride  10 mEq/100 mL IVPB 10 milliEquivalent(s) IV Intermittent once  rifAXIMin 550 milliGRAM(s) Oral two times a day  vancomycin  IVPB 500 milliGRAM(s) IV Intermittent every 24 hours          RADIOLOGY & ADDITIONAL TESTS:    11/4/20 : Xray Chest 1 View- PORTABLE-Routine (Xray Chest 1 View- PORTABLE-Routine in AM.) (11.04.20 @ 10:59) Reexpansion of the left lung with residual left pleural effusion and/or infiltrate.  Right pulmonary edema unchanged.  Tubes and catheters in satisfactory position.      10/25/20: Xray Chest 1 View- PORTABLE-Routine (Xray Chest 1 View- PORTABLE-Routine in AM.) (10.25.20 @ 09:21) Frontal expiratory view of the chest shows the heart to be similar in size. Endotracheal tube, right jugular line and feeding tube remain present.    The lungs show similar left upper lobe infiltrate with progression of right perihilar infiltrate. Pleural effusions are similar. There is no evidence of pneumothorax.      10/23/20 : Xray Chest 1 View-PORTABLE IMMEDIATE (Xray Chest 1 View-PORTABLE IMMEDIATE .) (10.23.20 @ 19:09) Feeding tube tip near GE junction as noted. Questionable right upper lobe infiltrate. Follow-up study is recommended as clinically warranted.      10/21/20 : CT Abdomen and Pelvis w/ Oral Cont and w/ IV Cont (10.21.20 @ 15:59) Mural thickening of the left colon and rectum. Liquid stool in the colon. Apparent segmental mural thickening of the mid to distal small bowel and the proximal duodenum. Findings may represent nonspecific enterocolitis, noninfectious inflammatory bowel disease, or ischemic bowel. Clinical correlation is recommended. The celiac axis artery, SMA, and KINA are patent without stenosis.    Dilatation of the mid small bowel is again noted. Oral contrast has reached the terminal ileum. Findings may represent small bowel obstruction, or ileus related to nonspecific enterocolitis.   Cholelithiasis.    Moderate to large ascites in the abdomen, increased since the previous examination. 5 mm nonspecific noncalcified left upper lobe lung nodule; if the patient's is in the high risk category (i.e. smoker), follow-up chest CT may be pursued in 12 months to ensure stability.    Combination of atelectasis and consolidation in the left lower lobe.    Possible 1.0 cm hypodense lesion in the left lobe of the thyroid. Thyroid ultrasound may be pursued for further evaluation.   Mild bilateral pleural effusions.    Aging determinate compression fracture at T5 vertebra.        10/13/20 : CT Abdomen and Pelvis w/ IV Cont (10.13.20 @ 18:50) Diffuse dilatation of small bowel loops without a clear transition is slightly increased, likely an ileus.  Decreased moderate ascites.    1.5 cm pancreatic versus peripancreatic soft tissue nodule    10/13/20 : Xray Chest 1 View- PORTABLE-Urgent (Xray Chest 1 View- PORTABLE-Urgent .) (10.13.20 @ 16:34) Clear lungs.          MICROBIOLOGY DATA:    Culture - Sputum . (10.26.20 @ 00:26)   - Ampicillin/Sulbactam: R <=8/4   - Cefazolin: R >16   - Ceftazidime: I 16   - Clindamycin: R >4   - Erythromycin: R >4   - Gentamicin: S <=1 Should not be used as monotherapy   - Levofloxacin: S <=0.5   - Linezolid: S 2   - Oxacillin: R >2   - Penicillin: R >8   - RIF- Rifampin: S <=1 Should not be used as monotherapy   - Tetra/Doxy: S <=1   - Trimethoprim/Sulfamethoxazole: S <=0.5/9.5   - Trimethoprim/Sulfamethoxazole: S <=0.5/9.5   - Vancomycin: S 1       MRSA/MSSA PCR (10.21.20 @ 09:41)   MRSA PCR Result.: Detected:       Culture - Blood (10.20.20 @ 22:09)   Specimen Source: .Blood Blood   Culture Results:  No growth to date.     Culture - Blood (10.20.20 @ 22:09)   Specimen Source: .Blood Blood   Culture Results:   No growth to date.     Culture - Blood in AM (10.16.20 @ 10:13)   Specimen Source: .Blood Blood-Peripheral   Culture Results: No growth to date.     Culture - Blood in AM (10.16.20 @ 10:13)   Specimen Source: .Blood Blood-Peripheral   Culture Results: No growth to date.     Culture - Blood (10.13.20 @ 22:23)   Growth in anaerobic bottle: Gram Negative Rods   Specimen Source: .Blood Blood-Peripheral   Organism: Blood Culture PCR   Culture Results: Growth in anaerobic bottle: Bacteroides fragilis   "Susceptibilities not performed"     Culture - Blood (10.13.20 @ 22:23)   Specimen Source: .Blood Blood-Peripheral   Culture Results:   No growth to date.                Patient is seen and examined at the bed side, remains afebrile and off pressor.  She failed weaning triAL TODAY.      REVIEW OF SYSTEMS: Unable to obtain due to mental status         ALLERGIES: No Known Allergies        ICU Vital Signs Last 24 Hrs  T(C): 36.2 (08 Nov 2020 12:00), Max: 36.4 (07 Nov 2020 17:00)  T(F): 97.1 (08 Nov 2020 12:00), Max: 97.6 (08 Nov 2020 03:30)  HR: 70 (08 Nov 2020 16:35) (67 - 85)  BP: 98/62 (08 Nov 2020 16:00) (92/59 - 116/76)  BP(mean): 70 (08 Nov 2020 16:00) (64 - 86)  ABP: --  ABP(mean): --  RR: 24 (08 Nov 2020 16:00) (20 - 29)  SpO2: 100% (08 Nov 2020 16:35) (85% - 100%)        PHYSICAL EXAM:  GENERAL: Intubated /vented   CHEST/LUNG: Not using accessory muscles   HEART: s1 and s2 present  ABDOMEN:  Nontender and  Nondistended  EXTREMITIES: B/L UE edematous  CNS: Intubated/vented, awake          LABS:                        8.2    6.62  )-----------( 128      ( 08 Nov 2020 03:47 )             23.3                           6.7    7.74  )-----------( 134      ( 07 Nov 2020 11:08 )             19.7            11-08    141  |  105  |  33<H>  ----------------------------<  107<H>  3.7   |  29  |  2.14<H>    Ca    8.1<L>      08 Nov 2020 14:25  Phos  3.1     11-08  Mg     2.1     11-08    TPro  4.9<L>  /  Alb  2.7<L>  /  TBili  7.2<H>  /  DBili  x   /  AST  131<H>  /  ALT  62<H>  /  AlkPhos  87  11-08      11-06    138  |  104  |  37<H>  ----------------------------<  81  3.9   |  25  |  2.37<H>    Ca    8.1<L>      06 Nov 2020 06:20  Phos  3.4     11-06  Mg     2.0     11-06    TPro  5.1<L>  /  Alb  2.8<L>  /  TBili  9.2<H>  /  DBili  x   /  AST  233<H>  /  ALT  99<H>  /  AlkPhos  96  11-06      Vancomycin Level, Trough (11.07.20 @ 21:49)   Vancomycin Level, Trough: 16.1:     Vancomycin Level, Trough (11.07.20 @ 07:08)   Vancomycin Level, Trough: 19.5    vanVancomycin Level, Trough (11.06.20 @ 06:20)   Vancomycin Level, Trough: 19.9:     Vancomycin Level, Trough (11.04.20 @ 06:12)   Vancomycin Level, Trough: 31.0:     Vancomycin Level, Trough (11.03.20 @ 04:11)   Vancomycin Level, Trough: 16.1:    Vancomycin Level, Trough (11.01.20 @ 04:59)   Vancomycin Level, Trough: 20.9:      Procalcitonin, Serum (10.15.20 @ 11:48)   Procalcitonin, Serum: 0.16: Procalcitonin (PCT) Interpretation (ng/mL) - Diagnosis of systemic   bacterial infection/sepsis         MEDICATIONS  (STANDING):    chlorhexidine 0.12% Liquid 15 milliLiter(s) Oral Mucosa every 12 hours  chlorhexidine 2% Cloths 1 Application(s) Topical daily  furosemide   Injectable 80 milliGRAM(s) IV Push every 12 hours  heparin   Injectable 5000 Unit(s) SubCutaneous every 12 hours  lactulose Syrup 10 Gram(s) Oral daily  levoFLOXacin IVPB      midodrine. 10 milliGRAM(s) Oral three times a day  nystatin Powder 1 Application(s) Topical two times a day  pantoprazole  Injectable 40 milliGRAM(s) IV Push two times a day  potassium chloride  10 mEq/100 mL IVPB 10 milliEquivalent(s) IV Intermittent once  rifAXIMin 550 milliGRAM(s) Oral two times a day  vancomycin  IVPB 500 milliGRAM(s) IV Intermittent every 24 hours          RADIOLOGY & ADDITIONAL TESTS:    11/4/20 : Xray Chest 1 View- PORTABLE-Routine (Xray Chest 1 View- PORTABLE-Routine in AM.) (11.04.20 @ 10:59) Reexpansion of the left lung with residual left pleural effusion and/or infiltrate.  Right pulmonary edema unchanged.  Tubes and catheters in satisfactory position.      10/25/20: Xray Chest 1 View- PORTABLE-Routine (Xray Chest 1 View- PORTABLE-Routine in AM.) (10.25.20 @ 09:21) Frontal expiratory view of the chest shows the heart to be similar in size. Endotracheal tube, right jugular line and feeding tube remain present.    The lungs show similar left upper lobe infiltrate with progression of right perihilar infiltrate. Pleural effusions are similar. There is no evidence of pneumothorax.      10/23/20 : Xray Chest 1 View-PORTABLE IMMEDIATE (Xray Chest 1 View-PORTABLE IMMEDIATE .) (10.23.20 @ 19:09) Feeding tube tip near GE junction as noted. Questionable right upper lobe infiltrate. Follow-up study is recommended as clinically warranted.      10/21/20 : CT Abdomen and Pelvis w/ Oral Cont and w/ IV Cont (10.21.20 @ 15:59) Mural thickening of the left colon and rectum. Liquid stool in the colon. Apparent segmental mural thickening of the mid to distal small bowel and the proximal duodenum. Findings may represent nonspecific enterocolitis, noninfectious inflammatory bowel disease, or ischemic bowel. Clinical correlation is recommended. The celiac axis artery, SMA, and KINA are patent without stenosis.    Dilatation of the mid small bowel is again noted. Oral contrast has reached the terminal ileum. Findings may represent small bowel obstruction, or ileus related to nonspecific enterocolitis.   Cholelithiasis.    Moderate to large ascites in the abdomen, increased since the previous examination. 5 mm nonspecific noncalcified left upper lobe lung nodule; if the patient's is in the high risk category (i.e. smoker), follow-up chest CT may be pursued in 12 months to ensure stability.    Combination of atelectasis and consolidation in the left lower lobe.    Possible 1.0 cm hypodense lesion in the left lobe of the thyroid. Thyroid ultrasound may be pursued for further evaluation.   Mild bilateral pleural effusions.    Aging determinate compression fracture at T5 vertebra.        10/13/20 : CT Abdomen and Pelvis w/ IV Cont (10.13.20 @ 18:50) Diffuse dilatation of small bowel loops without a clear transition is slightly increased, likely an ileus.  Decreased moderate ascites.    1.5 cm pancreatic versus peripancreatic soft tissue nodule    10/13/20 : Xray Chest 1 View- PORTABLE-Urgent (Xray Chest 1 View- PORTABLE-Urgent .) (10.13.20 @ 16:34) Clear lungs.          MICROBIOLOGY DATA:    Culture - Sputum . (10.26.20 @ 00:26)   - Ampicillin/Sulbactam: R <=8/4   - Cefazolin: R >16   - Ceftazidime: I 16   - Clindamycin: R >4   - Erythromycin: R >4   - Gentamicin: S <=1 Should not be used as monotherapy   - Levofloxacin: S <=0.5   - Linezolid: S 2   - Oxacillin: R >2   - Penicillin: R >8   - RIF- Rifampin: S <=1 Should not be used as monotherapy   - Tetra/Doxy: S <=1   - Trimethoprim/Sulfamethoxazole: S <=0.5/9.5   - Trimethoprim/Sulfamethoxazole: S <=0.5/9.5   - Vancomycin: S 1       MRSA/MSSA PCR (10.21.20 @ 09:41)   MRSA PCR Result.: Detected:       Culture - Blood (10.20.20 @ 22:09)   Specimen Source: .Blood Blood   Culture Results:  No growth to date.     Culture - Blood (10.20.20 @ 22:09)   Specimen Source: .Blood Blood   Culture Results:   No growth to date.     Culture - Blood in AM (10.16.20 @ 10:13)   Specimen Source: .Blood Blood-Peripheral   Culture Results: No growth to date.     Culture - Blood in AM (10.16.20 @ 10:13)   Specimen Source: .Blood Blood-Peripheral   Culture Results: No growth to date.     Culture - Blood (10.13.20 @ 22:23)   Growth in anaerobic bottle: Gram Negative Rods   Specimen Source: .Blood Blood-Peripheral   Organism: Blood Culture PCR   Culture Results: Growth in anaerobic bottle: Bacteroides fragilis   "Susceptibilities not performed"     Culture - Blood (10.13.20 @ 22:23)   Specimen Source: .Blood Blood-Peripheral   Culture Results:   No growth to date.

## 2020-11-08 NOTE — PROGRESS NOTE ADULT - SUBJECTIVE AND OBJECTIVE BOX
INTERVAL HPI/OVERNIGHT EVENTS:   patient had a drop in H/H to 6.6 , recived 1 UPRBC , Hb improved to 8.2   Vanc through below 20 , one dose of vancomycin 500 mg given    Vanc through tomorrow at 8pm          PRESSORS: [ ] YES [ ] NO  WHICH:    ANTIBIOTICS:                  DATE STARTED:  ANTIBIOTICS:                  DATE STARTED:  ANTIBIOTICS:                  DATE STARTED:    Antimicrobial:  levoFLOXacin IVPB      rifAXIMin 550 milliGRAM(s) Oral two times a day  vancomycin  IVPB 500 milliGRAM(s) IV Intermittent every 24 hours    Cardiovascular:  furosemide   Injectable 80 milliGRAM(s) IV Push every 12 hours  midodrine. 10 milliGRAM(s) Oral three times a day    Pulmonary:    Hematalogic:  heparin   Injectable 5000 Unit(s) SubCutaneous every 12 hours    Other:  chlorhexidine 0.12% Liquid 15 milliLiter(s) Oral Mucosa every 12 hours  chlorhexidine 2% Cloths 1 Application(s) Topical daily  lactulose Syrup 10 Gram(s) Oral daily  nystatin Powder 1 Application(s) Topical two times a day  pantoprazole  Injectable 40 milliGRAM(s) IV Push two times a day  sodium chloride 0.9% lock flush 10 milliLiter(s) IV Push every 1 hour PRN    chlorhexidine 0.12% Liquid 15 milliLiter(s) Oral Mucosa every 12 hours  chlorhexidine 2% Cloths 1 Application(s) Topical daily  furosemide   Injectable 80 milliGRAM(s) IV Push every 12 hours  heparin   Injectable 5000 Unit(s) SubCutaneous every 12 hours  lactulose Syrup 10 Gram(s) Oral daily  levoFLOXacin IVPB      midodrine. 10 milliGRAM(s) Oral three times a day  nystatin Powder 1 Application(s) Topical two times a day  pantoprazole  Injectable 40 milliGRAM(s) IV Push two times a day  rifAXIMin 550 milliGRAM(s) Oral two times a day  sodium chloride 0.9% lock flush 10 milliLiter(s) IV Push every 1 hour PRN  vancomycin  IVPB 500 milliGRAM(s) IV Intermittent every 24 hours    Drug Dosing Weight  Height (cm): 167.6 (21 Oct 2020 02:26)  Weight (kg): 56.2 (21 Oct 2020 02:26)  BMI (kg/m2): 20 (21 Oct 2020 02:26)  BSA (m2): 1.63 (21 Oct 2020 02:26)    CENTRAL LINE: [ ] YES [ ] NO  LOCATION:         TREJO: [ ] YES [ ] NO          A-LINE:  [ ] YES [ ] NO  LOCATION:             ICU Vital Signs Last 24 Hrs  T(C): 36.1 (07 Nov 2020 23:00), Max: 37.8 (07 Nov 2020 05:30)  T(F): 97 (07 Nov 2020 23:00), Max: 100 (07 Nov 2020 05:30)  HR: 74 (08 Nov 2020 01:00) (67 - 89)  BP: 97/60 (08 Nov 2020 01:00) (92/52 - 113/66)  BP(mean): 69 (08 Nov 2020 01:00) (61 - 81)  ABP: --  ABP(mean): --  RR: 24 (08 Nov 2020 01:00) (20 - 30)  SpO2: 94% (08 Nov 2020 01:00) (85% - 100%)      ABG - ( 06 Nov 2020 04:23 )  pH, Arterial: 7.40  pH, Blood: x     /  pCO2: 38    /  pO2: 132   / HCO3: 23    / Base Excess: -0.9  /  SaO2: 99                    11-06 @ 07:01  -  11-07 @ 07:00  --------------------------------------------------------  IN: 1315.9 mL / OUT: 1405 mL / NET: -89.1 mL        Mode: AC/ CMV (Assist Control/ Continuous Mandatory Ventilation)  RR (machine): 24  TV (machine): 375  FiO2: 40  PEEP: 5  ITime: 1  MAP: 11  PIP: 27      PHYSICAL EXAM:    GENERAL: Intubated and sedated + ETT  HEAD:  Atraumatic, Normocephalic  EYES: b/l pupil reactive to light.   NECK: Supple, normal appearance, No JVD; Normal thyroid; Trachea midline  NERVOUS SYSTEM:  Alert & Oriented X0  CHEST/LUNG: equal b/l air entry and rhonchi on auscultation  HEART: Regular rate and rhythm; No murmurs, rubs, or gallops  ABDOMEN: Soft, Nontender, mildly distended; Bowel sounds present  EXTREMITIES:  2+ Peripheral Pulses, No clubbing, cyanosis, or edema  LYMPH: No lymphadenopathy noted  SKIN: No rashes or lesions; Good capillary refill        LABS:  CBC Full  -  ( 07 Nov 2020 21:49 )  WBC Count : 6.83 K/uL  RBC Count : 2.75 M/uL  Hemoglobin : 8.2 g/dL  Hematocrit : 24.2 %  Platelet Count - Automated : 122 K/uL  Mean Cell Volume : 88.0 fl  Mean Cell Hemoglobin : 29.8 pg  Mean Cell Hemoglobin Concentration : 33.9 gm/dL  Auto Neutrophil # : x  Auto Lymphocyte # : x  Auto Monocyte # : x  Auto Eosinophil # : x  Auto Basophil # : x  Auto Neutrophil % : x  Auto Lymphocyte % : x  Auto Monocyte % : x  Auto Eosinophil % : x  Auto Basophil % : x    11-07    141  |  107  |  34<H>  ----------------------------<  111<H>  3.5   |  25  |  2.21<H>    Ca    8.1<L>      07 Nov 2020 07:08  Phos  2.8     11-07  Mg     1.9     11-07    TPro  5.2<L>  /  Alb  3.2<L>  /  TBili  8.2<H>  /  DBili  x   /  AST  146<H>  /  ALT  67<H>  /  AlkPhos  85  11-07            RADIOLOGY & ADDITIONAL STUDIES REVIEWED:  ***    [ ]GOALS OF CARE DISCUSSION WITH PATIENT/FAMILY/PROXY:    CRITICAL CARE TIME SPENT: 35 minutes INTERVAL HPI/OVERNIGHT EVENTS:   patient had a drop in H/H to 6.6 , recived 1 UPRBC , Hb improved to 8.2   Vanc through below 20 , one dose of vancomycin 500 mg given    Vanc through today at 9 pm          PRESSORS: [ ] YES [ ] NO  WHICH:    ANTIBIOTICS:                  DATE STARTED:  ANTIBIOTICS:                  DATE STARTED:  ANTIBIOTICS:                  DATE STARTED:    Antimicrobial:  levoFLOXacin IVPB      rifAXIMin 550 milliGRAM(s) Oral two times a day  vancomycin  IVPB 500 milliGRAM(s) IV Intermittent every 24 hours    Cardiovascular:  furosemide   Injectable 80 milliGRAM(s) IV Push every 12 hours  midodrine. 10 milliGRAM(s) Oral three times a day    Pulmonary:    Hematalogic:  heparin   Injectable 5000 Unit(s) SubCutaneous every 12 hours    Other:  chlorhexidine 0.12% Liquid 15 milliLiter(s) Oral Mucosa every 12 hours  chlorhexidine 2% Cloths 1 Application(s) Topical daily  lactulose Syrup 10 Gram(s) Oral daily  nystatin Powder 1 Application(s) Topical two times a day  pantoprazole  Injectable 40 milliGRAM(s) IV Push two times a day  sodium chloride 0.9% lock flush 10 milliLiter(s) IV Push every 1 hour PRN    chlorhexidine 0.12% Liquid 15 milliLiter(s) Oral Mucosa every 12 hours  chlorhexidine 2% Cloths 1 Application(s) Topical daily  furosemide   Injectable 80 milliGRAM(s) IV Push every 12 hours  heparin   Injectable 5000 Unit(s) SubCutaneous every 12 hours  lactulose Syrup 10 Gram(s) Oral daily  levoFLOXacin IVPB      midodrine. 10 milliGRAM(s) Oral three times a day  nystatin Powder 1 Application(s) Topical two times a day  pantoprazole  Injectable 40 milliGRAM(s) IV Push two times a day  rifAXIMin 550 milliGRAM(s) Oral two times a day  sodium chloride 0.9% lock flush 10 milliLiter(s) IV Push every 1 hour PRN  vancomycin  IVPB 500 milliGRAM(s) IV Intermittent every 24 hours    Drug Dosing Weight  Height (cm): 167.6 (21 Oct 2020 02:26)  Weight (kg): 56.2 (21 Oct 2020 02:26)  BMI (kg/m2): 20 (21 Oct 2020 02:26)  BSA (m2): 1.63 (21 Oct 2020 02:26)    CENTRAL LINE: [ ] YES [ ] NO  LOCATION:         TREJO: [ ] YES [ ] NO          A-LINE:  [ ] YES [ ] NO  LOCATION:             ICU Vital Signs Last 24 Hrs  T(C): 36.1 (07 Nov 2020 23:00), Max: 37.8 (07 Nov 2020 05:30)  T(F): 97 (07 Nov 2020 23:00), Max: 100 (07 Nov 2020 05:30)  HR: 74 (08 Nov 2020 01:00) (67 - 89)  BP: 97/60 (08 Nov 2020 01:00) (92/52 - 113/66)  BP(mean): 69 (08 Nov 2020 01:00) (61 - 81)  ABP: --  ABP(mean): --  RR: 24 (08 Nov 2020 01:00) (20 - 30)  SpO2: 94% (08 Nov 2020 01:00) (85% - 100%)      ABG - ( 06 Nov 2020 04:23 )  pH, Arterial: 7.40  pH, Blood: x     /  pCO2: 38    /  pO2: 132   / HCO3: 23    / Base Excess: -0.9  /  SaO2: 99                    11-06 @ 07:01  -  11-07 @ 07:00  --------------------------------------------------------  IN: 1315.9 mL / OUT: 1405 mL / NET: -89.1 mL        Mode: AC/ CMV (Assist Control/ Continuous Mandatory Ventilation)  RR (machine): 24  TV (machine): 375  FiO2: 40  PEEP: 5  ITime: 1  MAP: 11  PIP: 27      PHYSICAL EXAM:    GENERAL: Intubated + ETT  HEAD:  Atraumatic, Normocephalic  EYES: b/l pupil reactive to light.   NECK: Supple, normal appearance, No JVD; Normal thyroid; Trachea midline  NERVOUS SYSTEM:  follows commands.  CHEST/LUNG:  and rhonchi on auscultation  HEART: Regular rate and rhythm; No murmurs, rubs, or gallops  ABDOMEN: Soft, Nontender, mildly distended; Bowel sounds present  EXTREMITIES:  2+ Peripheral Pulses, No clubbing, cyanosis, or edema  LYMPH: No lymphadenopathy noted  SKIN: No rashes or lesions; Good capillary refill        LABS:  CBC Full  -  ( 07 Nov 2020 21:49 )  WBC Count : 6.83 K/uL  RBC Count : 2.75 M/uL  Hemoglobin : 8.2 g/dL  Hematocrit : 24.2 %  Platelet Count - Automated : 122 K/uL  Mean Cell Volume : 88.0 fl  Mean Cell Hemoglobin : 29.8 pg  Mean Cell Hemoglobin Concentration : 33.9 gm/dL  Auto Neutrophil # : x  Auto Lymphocyte # : x  Auto Monocyte # : x  Auto Eosinophil # : x  Auto Basophil # : x  Auto Neutrophil % : x  Auto Lymphocyte % : x  Auto Monocyte % : x  Auto Eosinophil % : x  Auto Basophil % : x    11-07    141  |  107  |  34<H>  ----------------------------<  111<H>  3.5   |  25  |  2.21<H>    Ca    8.1<L>      07 Nov 2020 07:08  Phos  2.8     11-07  Mg     1.9     11-07    TPro  5.2<L>  /  Alb  3.2<L>  /  TBili  8.2<H>  /  DBili  x   /  AST  146<H>  /  ALT  67<H>  /  AlkPhos  85  11-07            RADIOLOGY & ADDITIONAL STUDIES REVIEWED:  ***    [ ]GOALS OF CARE DISCUSSION WITH PATIENT/FAMILY/PROXY:    CRITICAL CARE TIME SPENT: 35 minutes

## 2020-11-08 NOTE — PROGRESS NOTE ADULT - SUBJECTIVE AND OBJECTIVE BOX
Chief Complaint:  Patient is a 65y old  Female who presents with a chief complaint of Hypotension (2020 09:03)      Reason for consult: to r/o Budd Chiari Interval Events: Patient was seen and examined at bedside. Remains intubated on vent but remains awake, alert. Edema improved.     Hospital Medications:  chlorhexidine 0.12% Liquid 15 milliLiter(s) Oral Mucosa every 12 hours  chlorhexidine 2% Cloths 1 Application(s) Topical daily  furosemide   Injectable 80 milliGRAM(s) IV Push every 12 hours  heparin   Injectable 5000 Unit(s) SubCutaneous every 12 hours  lactulose Syrup 10 Gram(s) Oral daily  levoFLOXacin IVPB      midodrine. 10 milliGRAM(s) Oral three times a day  nystatin Powder 1 Application(s) Topical two times a day  pantoprazole  Injectable 40 milliGRAM(s) IV Push two times a day  rifAXIMin 550 milliGRAM(s) Oral two times a day  sodium chloride 0.9% lock flush 10 milliLiter(s) IV Push every 1 hour PRN  vancomycin  IVPB 500 milliGRAM(s) IV Intermittent every 24 hours      ROS:   Unable to obtain due to patient condition. Denies pain.     PHYSICAL EXAM:   Vital Signs:  Vital Signs Last 24 Hrs  T(C): 36.2 (2020 12:00), Max: 36.4 (2020 17:00)  T(F): 97.1 (2020 12:00), Max: 97.6 (2020 03:30)  HR: 70 (2020 12:00) (67 - 85)  BP: 98/61 (2020 12:00) (94/59 - 116/76)  BP(mean): 69 (2020 12:00) (64 - 86)  RR: 24 (2020 12:00) (22 - 29)  SpO2: 100% (2020 12:00) (85% - 100%)  Daily     Daily Weight in k (2020 07:30)    GENERAL: no acute distress  NEURO: awake, alert, following commands  HEENT: icteric sclera  CHEST: intubated on vent  CARDIAC: regular rate, rhythm  ABDOMEN: soft, non-tender, non-distended, no rebound or guarding, BS+, surgical scars  EXTREMITIES: Edema significantly decreased, no edema of LEs, RUE, but L hand, UE swollen  SKIN: no rash    LABS: reviewed                        8.2    6.62  )-----------( 128      ( 2020 03:47 )             23.3         140  |  104  |  35<H>  ----------------------------<  133<H>  2.9<LL>   |  27  |  2.17<H>    Ca    8.0<L>      2020 03:47  Phos  2.3       Mg     1.6         TPro  4.9<L>  /  Alb  2.7<L>  /  TBili  7.2<H>  /  DBili  x   /  AST  131<H>  /  ALT  62<H>  /  AlkPhos  87      LIVER FUNCTIONS - ( 2020 03:47 )  Alb: 2.7 g/dL / Pro: 4.9 g/dL / ALK PHOS: 87 U/L / ALT: 62 U/L DA / AST: 131 U/L / GGT: x             Interval Diagnostic Studies: see sunrise for full report

## 2020-11-08 NOTE — PROGRESS NOTE ADULT - ASSESSMENT
Patient is a 65y old  Female from home, lives with daughter, ambulates with walker with PMH of recurrent SBO's, s/p exp lap, SB resection in 2015, ex lap, ALBINA in 2018, DVT, PE, on Xarelto, IVC filter, chronic leg swelling, and anasarca, Now send in to the ER by her PCP, Dr. Ross, for evaluation of  generalized weakness and hypotension BP of 80/40 during office visit. On admission, she found to have no fever and BP was in lower normal range and no Leukocytosis. The CXR is clear and CT abd/pelvis consistent with Ileus. The ID consult requested to assist with evaluation of infectious etiology of episode of hypotension. Found to have Bacteroides bacteremia and now developed septic shock on Cefepime and Flagyl. Hence transferred to ICU. 10/20/20.    # Hypotension  at office- BP of 80/40 - resolved   #  Bacteroides fragilis Bacteremia - 10/13/20 - ? source most likely GI- Repeat Blood Cxs have NGTD 10/16/20  # Ileus  - h/o recurrent SBO and s/p EXp. LAp  # COVID 19 negative   # Septic shock ( Hypothermia + hypotensive)- transferred to ICU since requiring pressor- source GI, The CT abd/pelvis shows nonspecific enterocolitis, noninfectious inflammatory bowel disease, or ischemic bowel, along with ileus.   # Pneumonia- HCAP vs Aspiration - on CXR 10/24 and CT chest 10/21- sputum Cx grew Methicillin resistant Staphylococcus aureus  and Stenotrophomonas maltophilia.      would recommend:    1. Please continue Vancomycin 500 mg daily since level is therapeutic    2. Monitor kidney function and adjust Abx doses accordingly   3. Continue renally adjusted doses Levaquin to cover Stenotrophomonas  4. Aspiration precaution  5. Management of Vent as per ICU protocol  6. Keep extremities elevated, edema is improving       d/w ICU team     Attending Attestation:    Spent more than 45 minutes on total encounter, more than 50 % of the visit was spent counseling and/or coordinating care by the Attending physician. Patient is a 65y old  Female from home, lives with daughter, ambulates with walker with PMH of recurrent SBO's, s/p exp lap, SB resection in 2015, ex lap, ALBINA in 2018, DVT, PE, on Xarelto, IVC filter, chronic leg swelling, and anasarca, Now send in to the ER by her PCP, Dr. Ross, for evaluation of  generalized weakness and hypotension BP of 80/40 during office visit. On admission, she found to have no fever and BP was in lower normal range and no Leukocytosis. The CXR is clear and CT abd/pelvis consistent with Ileus. The ID consult requested to assist with evaluation of infectious etiology of episode of hypotension. Found to have Bacteroides bacteremia and now developed septic shock on Cefepime and Flagyl. Hence transferred to ICU. 10/20/20.    # Hypotension  at office- BP of 80/40 - resolved   #  Bacteroides fragilis Bacteremia - 10/13/20 - ? source most likely GI- Repeat Blood Cxs have NGTD 10/16/20  # Ileus  - h/o recurrent SBO and s/p EXp. LAp  # COVID 19 negative   # Septic shock ( Hypothermia + hypotensive)- transferred to ICU since requiring pressor- source GI, The CT abd/pelvis shows nonspecific enterocolitis, noninfectious inflammatory bowel disease, or ischemic bowel, along with ileus.   # Pneumonia- HCAP vs Aspiration - on CXR 10/24 and CT chest 10/21- sputum Cx grew Methicillin resistant Staphylococcus aureus  and Stenotrophomonas maltophilia.      would recommend:    1. Weaning trial  as per ICU protocol  2. Continue Vancomycin 500 mg daily since level is therapeutic    3. Monitor kidney function and adjust Abx doses accordingly   4. Continue renally adjusted doses Levaquin to cover Stenotrophomonas, Need Abx until 11/9/20  5. Aspiration precaution  6. Keep extremities elevated, edema is improving       d/w ICU team     Attending Attestation:    Spent more than 45 minutes on total encounter, more than 50 % of the visit was spent counseling and/or coordinating care by the Attending physician.

## 2020-11-09 NOTE — PROGRESS NOTE ADULT - ASSESSMENT
Patient is a 65y old  Female from home, lives with daughter, ambulates with walker with PMH of recurrent SBO's, s/p exp lap, SB resection in 2015, ex lap, ALBINA in 2018, DVT, PE, on Xarelto, IVC filter, chronic leg swelling, and anasarca, Now send in to the ER by her PCP, Dr. Ross, for evaluation of  generalized weakness and hypotension BP of 80/40 during office visit. On admission, she found to have no fever and BP was in lower normal range and no Leukocytosis. The CXR is clear and CT abd/pelvis consistent with Ileus. The ID consult requested to assist with evaluation of infectious etiology of episode of hypotension. Found to have Bacteroides bacteremia and now developed septic shock on Cefepime and Flagyl. Hence transferred to ICU. 10/20/20.    # Hypotension  at office- BP of 80/40 - resolved   #  Bacteroides fragilis Bacteremia - 10/13/20 - ? source most likely GI- Repeat Blood Cxs have NGTD 10/16/20  # Ileus  - h/o recurrent SBO and s/p EXp. LAp  # COVID 19 negative   # Septic shock ( Hypothermia + hypotensive)- transferred to ICU since requiring pressor- source GI, The CT abd/pelvis shows nonspecific enterocolitis, noninfectious inflammatory bowel disease, or ischemic bowel, along with ileus.   # Pneumonia- HCAP vs Aspiration - on CXR 10/24 and CT chest 10/21- sputum Cx grew Methicillin resistant Staphylococcus aureus  and Stenotrophomonas maltophilia.  # S/p extubated 11/9/20      would recommend:    1. Weaning trial  as per ICU protocol  2. Continue Vancomycin and Levaquin X 1 more day  3. Monitor kidney function and adjust Abx doses accordingly   4. Aspiration precaution  5. Keep extremities elevated, edema is improving       d/w ICU team , patient and Family at the bed side    Attending Attestation:    Spent more than 45 minutes on total encounter, more than 50 % of the visit was spent counseling and/or coordinating care by the Attending physician.

## 2020-11-09 NOTE — PROGRESS NOTE ADULT - SUBJECTIVE AND OBJECTIVE BOX
Patient was seen and examined  Patient is a 65y old  Female who presents with a chief complaint of Hypotension (09 Nov 2020 11:06)      INTERVAL HPI/OVERNIGHT EVENTS:  T(C): 36.1 (11-09-20 @ 05:00), Max: 36.4 (11-08-20 @ 20:00)  HR: 79 (11-09-20 @ 10:00) (64 - 91)  BP: 99/64 (11-09-20 @ 10:00) (89/59 - 110/68)  RR: 27 (11-09-20 @ 10:00) (15 - 27)  SpO2: 99% (11-09-20 @ 10:00) (94% - 100%)  Wt(kg): --  I&O's Summary    08 Nov 2020 07:01  -  09 Nov 2020 07:00  --------------------------------------------------------  IN: 1782.8 mL / OUT: 2990 mL / NET: -1207.2 mL    09 Nov 2020 07:01  -  09 Nov 2020 11:43  --------------------------------------------------------  IN: 124.2 mL / OUT: 325 mL / NET: -200.8 mL        LABS:                        8.8    7.43  )-----------( 137      ( 09 Nov 2020 05:58 )             24.8     11-09    142  |  106  |  34<H>  ----------------------------<  118<H>  3.3<L>   |  27  |  2.01<H>    Ca    8.4      09 Nov 2020 05:58  Phos  2.7     11-09  Mg     1.9     11-09    TPro  5.1<L>  /  Alb  2.7<L>  /  TBili  6.1<H>  /  DBili  x   /  AST  144<H>  /  ALT  70<H>  /  AlkPhos  92  11-09        CAPILLARY BLOOD GLUCOSE      POCT Blood Glucose.: 134 mg/dL (09 Nov 2020 00:08)  POCT Blood Glucose.: 118 mg/dL (08 Nov 2020 17:02)        ABG - ( 09 Nov 2020 04:28 )  pH, Arterial: 7.46  pH, Blood: x     /  pCO2: 35    /  pO2: 80    / HCO3: 24    / Base Excess: 1.3   /  SaO2: 94                    MEDICATIONS  (STANDING):  chlorhexidine 0.12% Liquid 15 milliLiter(s) Oral Mucosa every 12 hours  chlorhexidine 2% Cloths 1 Application(s) Topical daily  dexMEDEtomidine Infusion 0.1 MICROgram(s)/kG/Hr (1.41 mL/Hr) IV Continuous <Continuous>  furosemide   Injectable 80 milliGRAM(s) IV Push every 12 hours  heparin   Injectable 5000 Unit(s) SubCutaneous every 12 hours  lactulose Syrup 10 Gram(s) Oral daily  levoFLOXacin IVPB      levoFLOXacin IVPB 750 milliGRAM(s) IV Intermittent every 48 hours  midodrine. 10 milliGRAM(s) Oral three times a day  nystatin Powder 1 Application(s) Topical two times a day  pantoprazole  Injectable 40 milliGRAM(s) IV Push two times a day  rifAXIMin 550 milliGRAM(s) Oral two times a day  vancomycin  IVPB 500 milliGRAM(s) IV Intermittent every 24 hours    MEDICATIONS  (PRN):  sodium chloride 0.9% lock flush 10 milliLiter(s) IV Push every 1 hour PRN Pre/post blood products, medications, blood draw, and to maintain line patency      RADIOLOGY & ADDITIONAL TESTS:    Imaging Personally Reviewed:  [ ] YES  [ ] NO    REVIEW OF SYSTEMS:  unable     Consultant(s) Notes Reviewed:  [ ] YES  [ ] NO    PHYSICAL EXAM:  GENERAL: NAD, well-groomed, well-developed  HEAD:  Atraumatic, Normocephalic  EYES: EOMI, PERRLA, conjunctiva and sclera clear  ENMT: No tonsillar erythema, exudates, or enlargement; Moist mucous membranes, Good dentition, No lesions  NECK: Supple, No JVD, Normal thyroid  NERVOUS SYSTEM:  on vent   CHEST/LUNG: Clear to percussion bilaterally; No rales, rhonchi, wheezing, or rubs  HEART: Regular rate and rhythm; No murmurs, rubs, or gallops  ABDOMEN: Soft, Nontender, Nondistended; Bowel sounds present  EXTREMITIES:  2+ Peripheral Pulses, No clubbing, cyanosis, or edema  LYMPH: No lymphadenopathy noted  SKIN: No rashes or lesions    Care Discussed with Consultants/Other Providers [ x] YES  [ ] NO

## 2020-11-09 NOTE — PROGRESS NOTE ADULT - SUBJECTIVE AND OBJECTIVE BOX
awake  off pressor  no fever or hypothermia  still on vent       ROS:  Negative except for:    PAST MEDICAL & SURGICAL HISTORY:  Anasarca    Pulmonary embolism    DVT (deep venous thrombosis)    SBO (small bowel obstruction)    H/O exploratory laparotomy        SOCIAL HISTORY:    FAMILY HISTORY:  No pertinent family history in first degree relatives        MEDICATIONS  (STANDING):  chlorhexidine 0.12% Liquid 15 milliLiter(s) Oral Mucosa every 12 hours  chlorhexidine 2% Cloths 1 Application(s) Topical daily  dexMEDEtomidine Infusion 0.1 MICROgram(s)/kG/Hr (1.41 mL/Hr) IV Continuous <Continuous>  furosemide   Injectable 80 milliGRAM(s) IV Push every 12 hours  heparin   Injectable 5000 Unit(s) SubCutaneous every 12 hours  lactulose Syrup 10 Gram(s) Oral daily  levoFLOXacin IVPB      levoFLOXacin IVPB 750 milliGRAM(s) IV Intermittent every 48 hours  midodrine. 10 milliGRAM(s) Oral three times a day  nystatin Powder 1 Application(s) Topical two times a day  pantoprazole  Injectable 40 milliGRAM(s) IV Push two times a day  rifAXIMin 550 milliGRAM(s) Oral two times a day  vancomycin  IVPB 500 milliGRAM(s) IV Intermittent every 24 hours    MEDICATIONS  (PRN):  sodium chloride 0.9% lock flush 10 milliLiter(s) IV Push every 1 hour PRN Pre/post blood products, medications, blood draw, and to maintain line patency      Allergies    No Known Allergies    Intolerances        Vital Signs Last 24 Hrs  T(C): 36.1 (09 Nov 2020 05:00), Max: 36.4 (08 Nov 2020 20:00)  T(F): 97 (09 Nov 2020 05:00), Max: 97.6 (08 Nov 2020 20:00)  HR: 83 (09 Nov 2020 09:00) (64 - 91)  BP: 92/65 (09 Nov 2020 09:00) (89/59 - 110/68)  BP(mean): 70 (09 Nov 2020 09:00) (65 - 78)  RR: 24 (09 Nov 2020 09:00) (15 - 27)  SpO2: 99% (09 Nov 2020 09:00) (94% - 100%)    PHYSICAL EXAM  General: adult in NAD  HEENT: clear oropharynx, anicteric sclera, pink conjunctiva  Neck: supple  CV: normal S1/S2 with no murmur rubs or gallops  Lungs: positive air movement b/l ant lungs,clear to auscultation, no wheezes, no rales  Abdomen: soft non-tender non-distended, no hepatosplenomegaly  Ext: no clubbing cyanosis or edema  Skin: no rashes and no petechiae  Neuro: alert and oriented X 4, no focal deficits      LABS:                          8.8    7.43  )-----------( 137      ( 09 Nov 2020 05:58 )             24.8         Mean Cell Volume : 87.6 fl  Mean Cell Hemoglobin : 31.1 pg  Mean Cell Hemoglobin Concentration : 35.5 gm/dL  Auto Neutrophil # : 5.86 K/uL  Auto Lymphocyte # : 0.61 K/uL  Auto Monocyte # : 0.71 K/uL  Auto Eosinophil # : 0.15 K/uL  Auto Basophil # : 0.05 K/uL  Auto Neutrophil % : 78.8 %  Auto Lymphocyte % : 8.2 %  Auto Monocyte % : 9.6 %  Auto Eosinophil % : 2.0 %  Auto Basophil % : 0.7 %      Serial CBC's  11-09 @ 05:58  Hct-24.8 / Hgb-8.8 / Plat-137 / RBC-2.83 / WBC-7.43  Serial CBC's  11-08 @ 03:47  Hct-23.3 / Hgb-8.2 / Plat-128 / RBC-2.67 / WBC-6.62  Serial CBC's  11-07 @ 21:49  Hct-24.2 / Hgb-8.2 / Plat-122 / RBC-2.75 / WBC-6.83  Serial CBC's  11-07 @ 11:08  Hct-19.7 / Hgb-6.7 / Plat-134 / RBC-2.18 / WBC-7.74  Serial CBC's  11-07 @ 07:08  Hct-19.4 / Hgb-6.6 / Plat-139 / RBC-2.15 / WBC-7.08  Serial CBC's  11-06 @ 06:20  Hct-21.9 / Hgb-7.6 / Plat-152 / RBC-2.44 / WBC-8.70      11-09    142  |  106  |  34<H>  ----------------------------<  118<H>  3.3<L>   |  27  |  2.01<H>    Ca    8.4      09 Nov 2020 05:58  Phos  2.7     11-09  Mg     1.9     11-09    TPro  5.1<L>  /  Alb  2.7<L>  /  TBili  6.1<H>  /  DBili  x   /  AST  144<H>  /  ALT  70<H>  /  AlkPhos  92  11-09                      BLOOD SMEAR INTERPRETATION:       RADIOLOGY & ADDITIONAL STUDIES:    · Assessment	  65 year old lady with 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  BC grew bacteroides.    Problem/Recommendation - 1:  Problem: Bacteremia. Recommendation: bacteroides  most likely GI origin  on antibiotics  she had hypothermia and hypotension and elevated lactic acid  in ICU now  multiorgan failure but improving  christel is trending down now, cholestasis, etiology?  Cr continue to trend down slowly,  may be due to poor perfusion  Problem/Recommendation - 2:·  Problem: Anemia.  Recommendation: ferritin, b12 folate normalno hemolysis  most likley due to malnutrition from short bowel syndrome.she also had GIB multiple times before. Problem/Recommendation - 3:·  Problem: Acute deep vein thrombosis (DVT) of other vein of upper extremity.  Recommendation: she has been on xarelto for 2 years.  DVT at left arm and left leg before.  in all CT scan i did not see report of IVC or hepatic vein thrombosis  off xarelto and lovenox due to elevated PT/PTT. elevated PT/PTT due to malnutrition and antibiotics causing VITK def  now it is mostly recovered  it begins to rise again, correctable by mixing study  probably due to liver failure this time  she develops DVT very quickly while off ACneed DVT prophylaxis, sq heparin  5. thrombocytopenia  new onset, most likely from sepsis or medication, procal is rising  the other possibilties causing multiorgan failure, including thrombocytopenia,  such as autoimmune, vasculitis, HLH, catastrophic APS(less likely)  platelet is trending up, 150 today  6. CHF, new onset global hypokinesia,   cardiomyopathy, ?etiology  will give thiamin

## 2020-11-09 NOTE — PROGRESS NOTE ADULT - ASSESSMENT
Hospital course:  64 yo Female with Hx of recurrent SBO`s s/p exp lap, small bowel resection in 2015, ex lap, ALBINA in 2018, DVT, PE (was on Xarelto), IVC filter and s/p supra-hepatic IVC thrombosis/occlusion, anemia, anasarca was sent to ED by PCP for hypotension, generalized weakness and chills and was admitted on 10/13/2020 for hypotension, r/o sepsis , found to have Bacteroides fragilis bacteremia, suspected GI source, was treated with cefepime + metronidazole, remained hypothermic, hypotensive, was transferred to ICU and switched to meropenem on 10/21. Repeat CT 10/21 suggested  non-specific enterocolitis, noninfectious inflammatory bowel disease or ischemic bowel along with ileus, and concern for SBO given mild luminal narrowing at distal small bowel. Patient remained hypotensive, hypothermic, requiring pressor support, septic and became encephalopathic and got intubated on 10/24, also diagnosed with HCAP vs. aspirational PNA, sputum growing MRSA and Stenotrophomonas maltophilia.  She was switched to levofloxacin on 10/27 and now on levo plus vancomycin. 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 Liver enzymes were /are up-trending, along with bilirubin, was suspected due to ongoing sepsis. Patient also noted with cardiomyopathy with EF 10-15%. Remains intubated, but with improving mental status and now off pressor.    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 (as per d/w primary team, it was non-conclusive)  - No caudate lobe hypertrophy reported (present in 75% of Budd Chiari cases), no macroregenerative nodules reported, characteristic pattern of parenchymal perfusion not described, but study reported limited due to motion artifact   - Venography would be gold standard for diagnosis, discussed earlier with IR, if patient will be more stable (possibly outpatient), can be [facility] at [facility] for Dx and potential intervention, however this time patient remains intubated , on pressor and now with EF 10-15 %, thus not transplant candidate and not candidate for TIPS  - was already on full dose anticoagulation, was being held b/o coagulopathy and now b/o severe thrombocytopenia, AC per primary team/hem (currently on DVT ppx)  - portal vein and IVC were patent on recent imaging  - Repeat US abd was done, but was limited and no comment either on hepatic vein again or on collaterals  - anticardiolipin antibody pos 1x (prior negative 1x)     # Abnormal liver enzymes with hyperbilirubinemia and coagulopathy, now MOF  # Ascites  - Elevated liver enzymes were thought partially due to ongoing sepsis (only mildly elevated or normal on admission and started to worsen when patient condition deteriorated, became hypotensive, hypothermic; peak  / now 144, peak  / now 70; peak ALP  316 / now 92; peak Se bi 9.2 / now 6.1 )   - Possibly the combination of sepsis /MOF and also due to severely reduced LVEF (10-15%) and RV dysfunction - improving as volume status improved, although still significantly elevated, thus cannot fully rule out additional etiology  - Dx paracentesis could be useful (last 2 sets of BCx nl) but as d/w IR, no safe window based on last CT and now small ascites on recent US abdomen - US was limited, check with IR if amenable for Dx paracentesis  - Prior ascites analysis from 2019: ascites albumin< 0.2, protein < 1; serum albumin 1.0; SAAG< 1.1 (early Budd Chiari would usually give SAAG > 1.1, total protein > 2.5, and in late Budd Chiari SAAG> 1.1 and total protein < 2.5), SAAG < 1.1 with low protein could be due to bowel obstruction/infarction, serositis among others  - prior liver workup during prior admissions: KATHY neg, AMA neg, HBsAg neg, HBcAb IgM neg, HAV IgM neg)  - Given that still up-trending bilirubin, and coagulopathy, resent acute hep panel - negative, EBV - past infection, HSV IgG pos, CMV PCR neg, can send VZV PCR, hep E serology; KATHY neg, can consider rest of AI workup, however given MOF, GOC discussions are ongoing with family  - anticardiolipin IgM pos, beta 2 glycoprotein pos     # Encephalopathy, possibly multifactorial; Mental status overall improved, following commands  - c/w lactulose to titrate 2-3 BM/day and c/w rifaximin   - CT head was not done b/o lac-k of focal signs      Will continue to follow  Thank you for the consult

## 2020-11-09 NOTE — PROGRESS NOTE ADULT - ASSESSMENT
65y Female from home, lives with daughter, ambulates with walker with PMH of recurrent SBO's, s/p exp lap, SB resection in 2015, ex lap, ALBINA in 2018, DVT, PE, on Xarelto, IVC filter, chronic leg swelling, and anasarca, came in to the ED due to hypotension during office visit. Patient was found to be bacteremic with bacteroids fragilis. Admitted in ICU due to hypotension and hypothermia. On vancomycin and levo . BCx X2 negative. Sputum positive for MRSA and Stenotrophomonas.     Diagnosis:  >Encephalopathy  >Sepsis  >Bacteremia  >Anemia  >DVT/PE  >Transaminitis  >Aspiration pneumonia    --------------------------------------------Neuro--------------------------------------  -She is AAOx2 at baseline on evaluation  -Encephalopathic and got intubated on 10/24  -Ammonia trending down from 152 > 130 > 116 >103>131>126>90>77----> less than 10  -On lactulose 30g daily and rifaximin, goal BM 2-3   -INR>1.63>1.71>1.91>1.98> 1.44  -Pt with multiorgan failure secondary to septic shock  -Hold CT head for now as AMS was most likely due to hepatic encephalopathy and pt didn't have any focal neurological deficit when she started having AMS  -her pupils are reactive to light b/l, patient can follow commands   - started on small dose of Precedex for tachypnea  -Off to Precedex , RR 20      -------------------------------------CVS-------------------------------  -Patient is hypotensive secondary septic shock  - patients started on levophed   -On abx Levo renally dosed , Vancomycin stopped for high vanc trough ,  -NG tube feeding  -Midodrine 10 mg TID  -RIJ for pressors  -Lasix PRN for improving UO  -Pt is getting lasix and albumin on and off   -previous ECHO 3-4 months back showed normal EF but new ECHO showed EF 15-20% with severe left ventricular dysfunction  -pt with multiorgan dysfunction sec to septic shock , mixed venous gas from [facility] showed borderline low O2 saturation , mixed picture of septic /cardiogenic shock   - anticardiolipin antibodies IgG IgM , ANCA  -Anticardiolipin abs +ve      --------------------------------Respiratory----------------------  - failed sbt   -Intubated and sedated on levophed for hypotesnion  - s/p lavage and suction for left sided mucus plaque , with following re expansion   -CT showed mild b/l pleural effusion and left lower lobe consolidation in ICU on day 2  -aspiration pneumonia  - sputum culture grew MRSA and Stenotrophomonas (sensitive to levo)  - will c/w levaquin 500 q24 hours ,   -given one dose of vancomyicn last night.  will repat trough before next vancomycin dose.  --repeat sputum culture neg  -ID Dr. Patricio    ----------------------------------------Gastrointestinal--------------------------------------  -Patient was bacteremic with bacteroids fragilis, likely secondary from intra abdominal source.   -Patient had multiple SBO in past. 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 Likely due to sepsis vs hepatic failure sec to budd Chiari  -BCx X 2 negative.  -ammonia trends down from 150 to 130 >116>103>131>126>90> 77,--<10 on lactulose and rifaximin   -monitor BM, titrate lactulose top 2-3 BM qd   -GI, DR Chatterjee deferred doing colonoscopy due to multiple comorbidities, FOBT positive on 10/15  -Dr Katlyn aguilar has been noted,    -----------------------------------------ID---------------------------------------  -Patient was bacteremic with bacteroids likely GI source.  -Lactate is normal 1.3 but trends up to 2.3 >3.7  -Procalcitonin 0.79  -On levoquin and held vanc due to high trough ,   - BP on the lower side, pt is on Levophed baby dose and midodrine 10mg TID  -Monitor vitals Q4  - Given 1 dose Vancomycin 500mg 11/6 PM  - F/U Vanc trough 10PM    ---------------------------------------------Nephro-------------------------------------  - hypernatremia improved   - on free water 120 ml q6h  -TF  - Creatinine trended down slightly today , will avoid nephrotoxin , patient in MOF   -Monitor UO,   - UO improved after Lasix trail     ----------------------------------------Hem Onc----------------------------------  -Has h/o DVt and PE in past. Patient is on Xarelto at home  -Patient has anemia  -anemia of chronic disease  -Hgb 6.8>1PRBC>8.2> 7.9>7.5>7.4>7.6  -plt trended up to 96, no active bleeding  - will start on prophylactic dose Ac Heparin 5000 q12 hrs per dr rivera recommendation   -Folate and B12 normal.   -Dr. Rivera recs following, s/p thiamine 3 doses  - will check INR daily     ----------------------------------------Endo--------------------------------  -HgbA1c 4  -Fs q6h    ----------------------------------------Prophylaxis----------------------------  DVT: heparin sq 5000 q12 hrs  GI: PPI    ---------------------------------------GOC---------------------------  -DNR   -Pt with multi organ failure secondary to septic shock with poor prognosis  -Palliative is on board  -family discussion next week         65y Female from home, lives with daughter, ambulates with walker with PMH of recurrent SBO's, s/p exp lap, SB resection in 2015, ex lap, ALBINA in 2018, DVT, PE, on Xarelto, IVC filter, chronic leg swelling, and anasarca, came in to the ED due to hypotension during office visit. Patient was found to be bacteremic with bacteroids fragilis. Admitted in ICU due to hypotension and hypothermia. On vancomycin and levo . BCx X2 negative. Sputum positive for MRSA and Stenotrophomonas.     Diagnosis:  >Encephalopathy  >Sepsis  >Bacteremia  >Anemia  >DVT/PE  >Transaminitis  >Aspiration pneumonia    --------------------------------------------Neuro--------------------------------------  -She is AAOx2 at baseline on evaluation  -Encephalopathic and got intubated on 10/24, extubated on 11/9  -Ammonia trending down from 152 > 130 > 116 >103>131>126>90>77----> less than 10  -On lactulose 30g daily and rifaximin, goal BM 2-3   -INR>1.63>1.71>1.91>1.98> 1.44  -Pt with multiorgan failure secondary to septic shock  -Hold CT head for now as AMS was most likely due to hepatic encephalopathy and pt didn't have any focal neurological deficit when she started having AMS  -pupils are reactive to light b/l, patient can follow commands and communicates by responding, does not talk still  -  patint off precedex  -Off to Precedex , RR 20      -------------------------------------CVS-------------------------------  -Patient was hypotensive secondary septic shoc  started on levophed currently can maintain BP , hence off pressors   -On abx Levaquin renally dosed , Vancomycin 500mg q24hrs dosing with trough   -NG tube feeding  -Midodrine 10 mg TID  -RIJ for pressors   -Lasix 80mg BID for improving UO  -previous ECHO 3-4 months back showed normal EF but new ECHO showed EF 15-20% with severe left ventricular dysfunction  -pt with multiorgan dysfunction sec to septic shock , mixed venous gas from [facility] showed borderline low O2 saturation , mixed picture of septic /cardiogenic shock   - anticardiolipin antibodies IgG IgM , ANCA  -Anticardiolipin abs +ve      --------------------------------Respiratory----------------------  - Passed SBT and extubated on 11/9  -Intubated and sedated on levophed for hypotesnion  - CXR: small bilateral pleural effusion and atelectasia  -CT showed mild b/l pleural effusion and left lower lobe consolidation in ICU on day 2  -aspiration pneumonia  - sputum culture grew MRSA and Stenotrophomonas (sensitive to levo)  - will c/w levaquin 500 q24 hours ,   - will c/w vancomycin 500 a24hrs dosed with vanc though   --repeat sputum culture neg  -ID Dr. Patircio    ----------------------------------------Gastrointestinal--------------------------------------  -Patient was bacteremic with bacteroids fragilis, likely secondary from intra abdominal source.   -Patient had multiple SBO in past. 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 Likely due to sepsis vs hepatic failure sec to budd Chiari  -BCx X 2 negative.  -ammonia trends down from 150 to 130 >116>103>131>126>90> 77,--<10 on lactulose and rifaximin   -monitor BM, titrate lactulose top 2-3 BM qd   -GI, DR Chatterjee deferred doing colonoscopy due to multiple comorbidities, FOBT positive on 10/15  -Dr Katlyn aguilar has been noted,    -----------------------------------------ID---------------------------------------  -Patient was bacteremic with bacteroids likely GI source.  -Lactate is normal 1.3 but trends up to 2.3 >3.7  -Procalcitonin 0.79  -On Levaquin and held vanc due to high trough ,   - BP on the lower side, pt is on Levophed baby dose and midodrine 10mg TID  -Monitor vitals Q4  - Given 1 dose Vancomycin 500mg 11/6 PM  - F/U Vanc trough 10PM    ---------------------------------------------Nephro-------------------------------------  - hypernatremia improved   - on free water 120 ml q6h  -TF  - Creatinine trended down slightly today , will avoid nephrotoxin , patient in MOF   -Monitor UO,   - on Lasix 80 BID   - UO and anasarca improved    ----------------------------------------Hem Onc----------------------------------  -Has h/o DVt and PE in past. Patient is on Xarelto at home  -Patient has anemia  -anemia of chronic disease  -Hgb 6.8>1PRBC>8.2> 7.9>7.5>7.4>7.6  -plt trended up to 96, no active bleeding  - will start on prophylactic dose Ac Heparin 5000 q12 hrs per dr rivera recommendation   -Folate and B12 normal.   -Dr. Rivera recs following, s/p thiamine 3 doses  - will check INR daily     ----------------------------------------Endo--------------------------------  -HgbA1c 4  -Fs q6h    ----------------------------------------Prophylaxis----------------------------  DVT: heparin sq 5000 q12 hrs  GI: PPI    ---------------------------------------GOC---------------------------  -DNR   -Pt with multi organ failure secondary to septic shock with poor prognosis  -Palliative is on board  -family discussion next week

## 2020-11-09 NOTE — PROGRESS NOTE ADULT - SUBJECTIVE AND OBJECTIVE BOX
INTERVAL HPI/OVERNIGHT EVENTS: ***    PRESSORS: [ ] YES [ ] NO  WHICH:    ANTIBIOTICS:                  DATE STARTED:  ANTIBIOTICS:                  DATE STARTED:  ANTIBIOTICS:                  DATE STARTED:    Antimicrobial:  levoFLOXacin IVPB      levoFLOXacin IVPB 750 milliGRAM(s) IV Intermittent every 48 hours  rifAXIMin 550 milliGRAM(s) Oral two times a day  vancomycin  IVPB 500 milliGRAM(s) IV Intermittent every 24 hours    Cardiovascular:  furosemide   Injectable 80 milliGRAM(s) IV Push every 12 hours  midodrine. 10 milliGRAM(s) Oral three times a day    Pulmonary:    Hematalogic:  heparin   Injectable 5000 Unit(s) SubCutaneous every 12 hours    Other:  chlorhexidine 0.12% Liquid 15 milliLiter(s) Oral Mucosa every 12 hours  chlorhexidine 2% Cloths 1 Application(s) Topical daily  dexMEDEtomidine Infusion 0.1 MICROgram(s)/kG/Hr IV Continuous <Continuous>  lactulose Syrup 10 Gram(s) Oral daily  nystatin Powder 1 Application(s) Topical two times a day  pantoprazole  Injectable 40 milliGRAM(s) IV Push two times a day  sodium chloride 0.9% lock flush 10 milliLiter(s) IV Push every 1 hour PRN    chlorhexidine 0.12% Liquid 15 milliLiter(s) Oral Mucosa every 12 hours  chlorhexidine 2% Cloths 1 Application(s) Topical daily  dexMEDEtomidine Infusion 0.1 MICROgram(s)/kG/Hr IV Continuous <Continuous>  furosemide   Injectable 80 milliGRAM(s) IV Push every 12 hours  heparin   Injectable 5000 Unit(s) SubCutaneous every 12 hours  lactulose Syrup 10 Gram(s) Oral daily  levoFLOXacin IVPB      levoFLOXacin IVPB 750 milliGRAM(s) IV Intermittent every 48 hours  midodrine. 10 milliGRAM(s) Oral three times a day  nystatin Powder 1 Application(s) Topical two times a day  pantoprazole  Injectable 40 milliGRAM(s) IV Push two times a day  rifAXIMin 550 milliGRAM(s) Oral two times a day  sodium chloride 0.9% lock flush 10 milliLiter(s) IV Push every 1 hour PRN  vancomycin  IVPB 500 milliGRAM(s) IV Intermittent every 24 hours    Drug Dosing Weight  Height (cm): 167.6 (21 Oct 2020 02:26)  Weight (kg): 56.2 (21 Oct 2020 02:26)  BMI (kg/m2): 20 (21 Oct 2020 02:26)  BSA (m2): 1.63 (21 Oct 2020 02:26)    CENTRAL LINE: [ ] YES [ ] NO  LOCATION:         TREJO: [ ] YES [ ] NO          A-LINE:  [ ] YES [ ] NO  LOCATION:             ICU Vital Signs Last 24 Hrs  T(C): 36.1 (09 Nov 2020 05:00), Max: 36.4 (08 Nov 2020 20:00)  T(F): 97 (09 Nov 2020 05:00), Max: 97.6 (08 Nov 2020 20:00)  HR: 79 (09 Nov 2020 10:00) (64 - 91)  BP: 99/64 (09 Nov 2020 10:00) (89/59 - 110/68)  BP(mean): 72 (09 Nov 2020 10:00) (65 - 78)  ABP: --  ABP(mean): --  RR: 27 (09 Nov 2020 10:00) (15 - 27)  SpO2: 99% (09 Nov 2020 10:00) (94% - 100%)      ABG - ( 09 Nov 2020 04:28 )  pH, Arterial: 7.46  pH, Blood: x     /  pCO2: 35    /  pO2: 80    / HCO3: 24    / Base Excess: 1.3   /  SaO2: 94                    11-08 @ 07:01  -  11-09 @ 07:00  --------------------------------------------------------  IN: 1782.8 mL / OUT: 2990 mL / NET: -1207.2 mL        Mode: AC/ CMV (Assist Control/ Continuous Mandatory Ventilation)  RR (machine): 24  TV (machine): 375  FiO2: 40  PEEP: 5  ITime: 1  MAP: 12  PIP: 29      REVIEW OF SYSTEMS:    CONSTITUTIONAL: No weakness, fevers or chills  NECK: No pain or stiffness  RESPIRATORY: No cough, wheezing, hemoptysis; No shortness of breath  CARDIOVASCULAR: No chest pain or palpitations  GASTROINTESTINAL: No abdominal or epigastric pain. No nausea, vomiting, No diarrhea or constipation. No melena or hematochezia.  GENITOURINARY: No dysuria, frequency or hematuria  NEUROLOGICAL: No numbness or weakness  All other review of systems is negative unless indicated above      PHYSICAL EXAM:    GENERAL: NAD, well-groomed, well-developed  EYES: EOMI, PERRLA,   NECK: Supple, No JVD; Normal thyroid; Trachea midline  NERVOUS SYSTEM:  Alert & Oriented X3,  Motor Strength 5/5 B/L upper and lower extremities; DTRs 2+ intact and symmetric  CHEST/LUNG: No rales, rhonchi, wheezing   HEART: Regular rate and rhythm; No murmurs,   ABDOMEN: Soft, Nontender, Nondistended; Bowel sounds present  EXTREMITIES:  2+ Peripheral Pulses, No clubbing, cyanosis, or edema        LABS:  CBC Full  -  ( 09 Nov 2020 05:58 )  WBC Count : 7.43 K/uL  RBC Count : 2.83 M/uL  Hemoglobin : 8.8 g/dL  Hematocrit : 24.8 %  Platelet Count - Automated : 137 K/uL  Mean Cell Volume : 87.6 fl  Mean Cell Hemoglobin : 31.1 pg  Mean Cell Hemoglobin Concentration : 35.5 gm/dL  Auto Neutrophil # : 5.86 K/uL  Auto Lymphocyte # : 0.61 K/uL  Auto Monocyte # : 0.71 K/uL  Auto Eosinophil # : 0.15 K/uL  Auto Basophil # : 0.05 K/uL  Auto Neutrophil % : 78.8 %  Auto Lymphocyte % : 8.2 %  Auto Monocyte % : 9.6 %  Auto Eosinophil % : 2.0 %  Auto Basophil % : 0.7 %    11-09    142  |  106  |  34<H>  ----------------------------<  118<H>  3.3<L>   |  27  |  2.01<H>    Ca    8.4      09 Nov 2020 05:58  Phos  2.7     11-09  Mg     1.9     11-09    TPro  5.1<L>  /  Alb  2.7<L>  /  TBili  6.1<H>  /  DBili  x   /  AST  144<H>  /  ALT  70<H>  /  AlkPhos  92  11-09            RADIOLOGY & ADDITIONAL STUDIES REVIEWED:  ***    [ ]GOALS OF CARE DISCUSSION WITH PATIENT/FAMILY/PROXY:    CRITICAL CARE TIME SPENT: 35 minutes INTERVAL HPI/OVERNIGHT EVENTS:   tachypnic over night however saturating well and comfirtable   Awake in the morning , following simple commands  U/O improved with lasix 80 mg BID  will do SBT     PRESSORS: [ ] YES [ ] NO  WHICH:    ANTIBIOTICS:                  DATE STARTED:  ANTIBIOTICS:                  DATE STARTED:  ANTIBIOTICS:                  DATE STARTED:    Antimicrobial:  levoFLOXacin IVPB      levoFLOXacin IVPB 750 milliGRAM(s) IV Intermittent every 48 hours  rifAXIMin 550 milliGRAM(s) Oral two times a day  vancomycin  IVPB 500 milliGRAM(s) IV Intermittent every 24 hours    Cardiovascular:  furosemide   Injectable 80 milliGRAM(s) IV Push every 12 hours  midodrine. 10 milliGRAM(s) Oral three times a day    Pulmonary:    Hematalogic:  heparin   Injectable 5000 Unit(s) SubCutaneous every 12 hours    Other:  chlorhexidine 0.12% Liquid 15 milliLiter(s) Oral Mucosa every 12 hours  chlorhexidine 2% Cloths 1 Application(s) Topical daily  dexMEDEtomidine Infusion 0.1 MICROgram(s)/kG/Hr IV Continuous <Continuous>  lactulose Syrup 10 Gram(s) Oral daily  nystatin Powder 1 Application(s) Topical two times a day  pantoprazole  Injectable 40 milliGRAM(s) IV Push two times a day  sodium chloride 0.9% lock flush 10 milliLiter(s) IV Push every 1 hour PRN    chlorhexidine 0.12% Liquid 15 milliLiter(s) Oral Mucosa every 12 hours  chlorhexidine 2% Cloths 1 Application(s) Topical daily  dexMEDEtomidine Infusion 0.1 MICROgram(s)/kG/Hr IV Continuous <Continuous>  furosemide   Injectable 80 milliGRAM(s) IV Push every 12 hours  heparin   Injectable 5000 Unit(s) SubCutaneous every 12 hours  lactulose Syrup 10 Gram(s) Oral daily  levoFLOXacin IVPB      levoFLOXacin IVPB 750 milliGRAM(s) IV Intermittent every 48 hours  midodrine. 10 milliGRAM(s) Oral three times a day  nystatin Powder 1 Application(s) Topical two times a day  pantoprazole  Injectable 40 milliGRAM(s) IV Push two times a day  rifAXIMin 550 milliGRAM(s) Oral two times a day  sodium chloride 0.9% lock flush 10 milliLiter(s) IV Push every 1 hour PRN  vancomycin  IVPB 500 milliGRAM(s) IV Intermittent every 24 hours    Drug Dosing Weight  Height (cm): 167.6 (21 Oct 2020 02:26)  Weight (kg): 56.2 (21 Oct 2020 02:26)  BMI (kg/m2): 20 (21 Oct 2020 02:26)  BSA (m2): 1.63 (21 Oct 2020 02:26)    CENTRAL LINE: [ ] YES [ ] NO  LOCATION:         TREJO: [ ] YES [ ] NO          A-LINE:  [ ] YES [ ] NO  LOCATION:             ICU Vital Signs Last 24 Hrs  T(C): 36.1 (09 Nov 2020 05:00), Max: 36.4 (08 Nov 2020 20:00)  T(F): 97 (09 Nov 2020 05:00), Max: 97.6 (08 Nov 2020 20:00)  HR: 79 (09 Nov 2020 10:00) (64 - 91)  BP: 99/64 (09 Nov 2020 10:00) (89/59 - 110/68)  BP(mean): 72 (09 Nov 2020 10:00) (65 - 78)  ABP: --  ABP(mean): --  RR: 27 (09 Nov 2020 10:00) (15 - 27)  SpO2: 99% (09 Nov 2020 10:00) (94% - 100%)      ABG - ( 09 Nov 2020 04:28 )  pH, Arterial: 7.46  pH, Blood: x     /  pCO2: 35    /  pO2: 80    / HCO3: 24    / Base Excess: 1.3   /  SaO2: 94                    11-08 @ 07:01  -  11-09 @ 07:00  --------------------------------------------------------  IN: 1782.8 mL / OUT: 2990 mL / NET: -1207.2 mL        Mode: AC/ CMV (Assist Control/ Continuous Mandatory Ventilation)  RR (machine): 24  TV (machine): 375  FiO2: 40  PEEP: 5  ITime: 1  MAP: 12  PIP: 29      PHYSICAL EXAM:    GENERAL: Intubated + ETT  HEAD:  Atraumatic, Normocephalic  EYES: b/l pupil reactive to light.   NECK: Supple, normal appearance, No JVD; Normal thyroid; Trachea midline  NERVOUS SYSTEM:  follows commands.  CHEST/LUNG:  and rhonchi on auscultation  HEART: Regular rate and rhythm; No murmurs, rubs, or gallops  ABDOMEN: Soft, Nontender, mildly distended; Bowel sounds present  EXTREMITIES:  2+ Peripheral Pulses, No clubbing, cyanosis, or edema  LYMPH: No lymphadenopathy noted  SKIN: No rashes or lesions; Good capillary refill  LABS:  CBC Full  -  ( 09 Nov 2020 05:58 )  WBC Count : 7.43 K/uL  RBC Count : 2.83 M/uL  Hemoglobin : 8.8 g/dL  Hematocrit : 24.8 %  Platelet Count - Automated : 137 K/uL  Mean Cell Volume : 87.6 fl  Mean Cell Hemoglobin : 31.1 pg  Mean Cell Hemoglobin Concentration : 35.5 gm/dL  Auto Neutrophil # : 5.86 K/uL  Auto Lymphocyte # : 0.61 K/uL  Auto Monocyte # : 0.71 K/uL  Auto Eosinophil # : 0.15 K/uL  Auto Basophil # : 0.05 K/uL  Auto Neutrophil % : 78.8 %  Auto Lymphocyte % : 8.2 %  Auto Monocyte % : 9.6 %  Auto Eosinophil % : 2.0 %  Auto Basophil % : 0.7 %    11-09    142  |  106  |  34<H>  ----------------------------<  118<H>  3.3<L>   |  27  |  2.01<H>    Ca    8.4      09 Nov 2020 05:58  Phos  2.7     11-09  Mg     1.9     11-09    TPro  5.1<L>  /  Alb  2.7<L>  /  TBili  6.1<H>  /  DBili  x   /  AST  144<H>  /  ALT  70<H>  /  AlkPhos  92  11-09            RADIOLOGY & ADDITIONAL STUDIES REVIEWED:  ***    [ ]GOALS OF CARE DISCUSSION WITH PATIENT/FAMILY/PROXY:    CRITICAL CARE TIME SPENT: 35 minutes

## 2020-11-09 NOTE — PROGRESS NOTE ADULT - SUBJECTIVE AND OBJECTIVE BOX
Chief Complaint:  Patient is a 65y old  Female who presents with a chief complaint of Hypotension (09 Nov 2020 11:43)      Reason for consult: to r/o Budd Chiari Interval Events: Patient was seen and examined at bedside. Remains intubated on vent but remains awake, alert. Edema improved.       Hospital Medications:  chlorhexidine 2% Cloths 1 Application(s) Topical daily  dexMEDEtomidine Infusion 0.1 MICROgram(s)/kG/Hr IV Continuous <Continuous>  furosemide   Injectable 80 milliGRAM(s) IV Push every 12 hours  heparin   Injectable 5000 Unit(s) SubCutaneous every 12 hours  lactulose Syrup 10 Gram(s) Oral daily  levoFLOXacin IVPB      levoFLOXacin IVPB 750 milliGRAM(s) IV Intermittent every 48 hours  midodrine. 10 milliGRAM(s) Oral three times a day  nystatin Powder 1 Application(s) Topical two times a day  pantoprazole  Injectable 40 milliGRAM(s) IV Push two times a day  rifAXIMin 550 milliGRAM(s) Oral two times a day  sodium chloride 0.9% lock flush 10 milliLiter(s) IV Push every 1 hour PRN  vancomycin  IVPB 500 milliGRAM(s) IV Intermittent every 24 hours      ROS:   Unable to obtain due to patient condition. Denies pain.       PHYSICAL EXAM:   Vital Signs:  Vital Signs Last 24 Hrs  T(C): 36.1 (09 Nov 2020 05:00), Max: 36.4 (08 Nov 2020 20:00)  T(F): 97 (09 Nov 2020 05:00), Max: 97.6 (08 Nov 2020 20:00)  HR: 94 (09 Nov 2020 13:00) (64 - 94)  BP: 93/82 (09 Nov 2020 13:00) (78/54 - 110/68)  BP(mean): 85 (09 Nov 2020 13:00) (60 - 85)  RR: 36 (09 Nov 2020 13:00) (15 - 36)  SpO2: 94% (09 Nov 2020 13:00) (94% - 100%)  Daily     Daily     GENERAL: no acute distress  NEURO: awake, alert, following commands  HEENT: icteric sclera  CHEST: intubated on vent  CARDIAC: regular rate, rhythm  ABDOMEN: soft, non-tender, non-distended, no rebound or guarding, BS+, surgical scars  EXTREMITIES: Edema significantly decreased, no edema of LEs, RUE, but L hand, UE swollen  SKIN: no rash  LABS: reviewed                        8.8    7.43  )-----------( 137      ( 09 Nov 2020 05:58 )             24.8     11-09    142  |  106  |  34<H>  ----------------------------<  118<H>  3.3<L>   |  27  |  2.01<H>    Ca    8.4      09 Nov 2020 05:58  Phos  2.7     11-09  Mg     1.9     11-09    TPro  5.1<L>  /  Alb  2.7<L>  /  TBili  6.1<H>  /  DBili  x   /  AST  144<H>  /  ALT  70<H>  /  AlkPhos  92  11-09    LIVER FUNCTIONS - ( 09 Nov 2020 05:58 )  Alb: 2.7 g/dL / Pro: 5.1 g/dL / ALK PHOS: 92 U/L / ALT: 70 U/L DA / AST: 144 U/L / GGT: x             Interval Diagnostic Studies: see sunrise for full report

## 2020-11-09 NOTE — PROGRESS NOTE ADULT - SUBJECTIVE AND OBJECTIVE BOX
Patient is seen and examined at the bed side, is afebrile. She is s/p extubation today, and saturating well on oxygen via NC. The kidney function is improving.         REVIEW OF SYSTEMS: All other review systems are negative        ALLERGIES: No Known Allergies        ICU Vital Signs Last 24 Hrs  T(C): 36.1 (09 Nov 2020 05:00), Max: 36.4 (08 Nov 2020 20:00)  T(F): 97 (09 Nov 2020 05:00), Max: 97.6 (08 Nov 2020 20:00)  HR: 87 (09 Nov 2020 17:00) (64 - 94)  BP: 105/62 (09 Nov 2020 17:00) (78/54 - 110/68)  BP(mean): 70 (09 Nov 2020 17:00) (60 - 85)  ABP: --  ABP(mean): --  RR: 29 (09 Nov 2020 17:00) (15 - 36)  SpO2: 98% (09 Nov 2020 17:00) (94% - 100%)          PHYSICAL EXAM:  GENERAL: s/p extubated, on oxygen via NC  CHEST/LUNG: Not using accessory muscles   HEART: s1 and s2 present  ABDOMEN:  Nontender and  Nondistended  EXTREMITIES: B/L UE edematous  CNS: Awake and Alert           LABS:                                   8.8    7.43  )-----------( 137      ( 09 Nov 2020 05:58 )             24.8                8.2    6.62  )-----------( 128      ( 08 Nov 2020 03:47 )             23.3          11-09    142  |  106  |  34<H>  ----------------------------<  118<H>  3.3<L>   |  27  |  2.01<H>    Ca    8.4      09 Nov 2020 05:58  Phos  2.7     11-09  Mg     1.9     11-09    TPro  5.1<L>  /  Alb  2.7<L>  /  TBili  6.1<H>  /  DBili  x   /  AST  144<H>  /  ALT  70<H>  /  AlkPhos  92  11-09          11-06    138  |  104  |  37<H>  ----------------------------<  81  3.9   |  25  |  2.37<H>    Ca    8.1<L>      06 Nov 2020 06:20  Phos  3.4     11-06  Mg     2.0     11-06    TPro  5.1<L>  /  Alb  2.8<L>  /  TBili  9.2<H>  /  DBili  x   /  AST  233<H>  /  ALT  99<H>  /  AlkPhos  96  11-06      Vancomycin Level, Trough (11.07.20 @ 21:49)   Vancomycin Level, Trough: 16.1:     Vancomycin Level, Trough (11.07.20 @ 07:08)   Vancomycin Level, Trough: 19.5    vanVancomycin Level, Trough (11.06.20 @ 06:20)   Vancomycin Level, Trough: 19.9:     Vancomycin Level, Trough (11.04.20 @ 06:12)   Vancomycin Level, Trough: 31.0:     Vancomycin Level, Trough (11.03.20 @ 04:11)   Vancomycin Level, Trough: 16.1:    Vancomycin Level, Trough (11.01.20 @ 04:59)   Vancomycin Level, Trough: 20.9:      Procalcitonin, Serum (10.15.20 @ 11:48)   Procalcitonin, Serum: 0.16: Procalcitonin (PCT) Interpretation (ng/mL) - Diagnosis of systemic   bacterial infection/sepsis         MEDICATIONS  (STANDING):    chlorhexidine 2% Cloths 1 Application(s) Topical daily  furosemide   Injectable 80 milliGRAM(s) IV Push every 12 hours  heparin   Injectable 5000 Unit(s) SubCutaneous every 12 hours  lactulose Syrup 10 Gram(s) Oral daily  levoFLOXacin IVPB      levoFLOXacin IVPB 750 milliGRAM(s) IV Intermittent every 48 hours  midodrine. 10 milliGRAM(s) Oral three times a day  nystatin Powder 1 Application(s) Topical two times a day  pantoprazole  Injectable 40 milliGRAM(s) IV Push two times a day  potassium chloride  10 mEq/100 mL IVPB 10 milliEquivalent(s) IV Intermittent every 1 hour  rifAXIMin 550 milliGRAM(s) Oral two times a day  vancomycin  IVPB 500 milliGRAM(s) IV Intermittent every 24 hours          RADIOLOGY & ADDITIONAL TESTS:    11/4/20 : Xray Chest 1 View- PORTABLE-Routine (Xray Chest 1 View- PORTABLE-Routine in AM.) (11.04.20 @ 10:59) Reexpansion of the left lung with residual left pleural effusion and/or infiltrate.  Right pulmonary edema unchanged.  Tubes and catheters in satisfactory position.      10/25/20: Xray Chest 1 View- PORTABLE-Routine (Xray Chest 1 View- PORTABLE-Routine in AM.) (10.25.20 @ 09:21) Frontal expiratory view of the chest shows the heart to be similar in size. Endotracheal tube, right jugular line and feeding tube remain present.    The lungs show similar left upper lobe infiltrate with progression of right perihilar infiltrate. Pleural effusions are similar. There is no evidence of pneumothorax.      10/23/20 : Xray Chest 1 View-PORTABLE IMMEDIATE (Xray Chest 1 View-PORTABLE IMMEDIATE .) (10.23.20 @ 19:09) Feeding tube tip near GE junction as noted. Questionable right upper lobe infiltrate. Follow-up study is recommended as clinically warranted.      10/21/20 : CT Abdomen and Pelvis w/ Oral Cont and w/ IV Cont (10.21.20 @ 15:59) Mural thickening of the left colon and rectum. Liquid stool in the colon. Apparent segmental mural thickening of the mid to distal small bowel and the proximal duodenum. Findings may represent nonspecific enterocolitis, noninfectious inflammatory bowel disease, or ischemic bowel. Clinical correlation is recommended. The celiac axis artery, SMA, and KINA are patent without stenosis.    Dilatation of the mid small bowel is again noted. Oral contrast has reached the terminal ileum. Findings may represent small bowel obstruction, or ileus related to nonspecific enterocolitis.   Cholelithiasis.    Moderate to large ascites in the abdomen, increased since the previous examination. 5 mm nonspecific noncalcified left upper lobe lung nodule; if the patient's is in the high risk category (i.e. smoker), follow-up chest CT may be pursued in 12 months to ensure stability.    Combination of atelectasis and consolidation in the left lower lobe.    Possible 1.0 cm hypodense lesion in the left lobe of the thyroid. Thyroid ultrasound may be pursued for further evaluation.   Mild bilateral pleural effusions.    Aging determinate compression fracture at T5 vertebra.        10/13/20 : CT Abdomen and Pelvis w/ IV Cont (10.13.20 @ 18:50) Diffuse dilatation of small bowel loops without a clear transition is slightly increased, likely an ileus.  Decreased moderate ascites.    1.5 cm pancreatic versus peripancreatic soft tissue nodule    10/13/20 : Xray Chest 1 View- PORTABLE-Urgent (Xray Chest 1 View- PORTABLE-Urgent .) (10.13.20 @ 16:34) Clear lungs.          MICROBIOLOGY DATA:    Culture - Sputum . (10.26.20 @ 00:26)   - Ampicillin/Sulbactam: R <=8/4   - Cefazolin: R >16   - Ceftazidime: I 16   - Clindamycin: R >4   - Erythromycin: R >4   - Gentamicin: S <=1 Should not be used as monotherapy   - Levofloxacin: S <=0.5   - Linezolid: S 2   - Oxacillin: R >2   - Penicillin: R >8   - RIF- Rifampin: S <=1 Should not be used as monotherapy   - Tetra/Doxy: S <=1   - Trimethoprim/Sulfamethoxazole: S <=0.5/9.5   - Trimethoprim/Sulfamethoxazole: S <=0.5/9.5   - Vancomycin: S 1       MRSA/MSSA PCR (10.21.20 @ 09:41)   MRSA PCR Result.: Detected:       Culture - Blood (10.20.20 @ 22:09)   Specimen Source: .Blood Blood   Culture Results:  No growth to date.     Culture - Blood (10.20.20 @ 22:09)   Specimen Source: .Blood Blood   Culture Results:   No growth to date.     Culture - Blood in AM (10.16.20 @ 10:13)   Specimen Source: .Blood Blood-Peripheral   Culture Results: No growth to date.     Culture - Blood in AM (10.16.20 @ 10:13)   Specimen Source: .Blood Blood-Peripheral   Culture Results: No growth to date.     Culture - Blood (10.13.20 @ 22:23)   Growth in anaerobic bottle: Gram Negative Rods   Specimen Source: .Blood Blood-Peripheral   Organism: Blood Culture PCR   Culture Results: Growth in anaerobic bottle: Bacteroides fragilis   "Susceptibilities not performed"     Culture - Blood (10.13.20 @ 22:23)   Specimen Source: .Blood Blood-Peripheral   Culture Results:   No growth to date.

## 2020-11-10 NOTE — PROGRESS NOTE ADULT - SUBJECTIVE AND OBJECTIVE BOX
OVERNIGHT EVENTS: extubated 11/9; on nasal canula    Present Symptoms:   Pain:  none  Review of Systems: Patient reports, "I"m alright."      MEDICATIONS  (STANDING):  chlorhexidine 2% Cloths 1 Application(s) Topical daily  furosemide   Injectable 80 milliGRAM(s) IV Push every 12 hours  heparin   Injectable 5000 Unit(s) SubCutaneous every 12 hours  lactulose Syrup 10 Gram(s) Oral daily  levoFLOXacin IVPB      levoFLOXacin IVPB 750 milliGRAM(s) IV Intermittent every 48 hours  midodrine. 10 milliGRAM(s) Oral three times a day  nystatin Powder 1 Application(s) Topical two times a day  pantoprazole  Injectable 40 milliGRAM(s) IV Push two times a day  rifAXIMin 550 milliGRAM(s) Oral two times a day  vancomycin  IVPB 500 milliGRAM(s) IV Intermittent every 24 hours    MEDICATIONS  (PRN):  sodium chloride 0.9% lock flush 10 milliLiter(s) IV Push every 1 hour PRN Pre/post blood products, medications, blood draw, and to maintain line patency      PHYSICAL EXAM:  Vital Signs Last 24 Hrs  T(C): 36.3 (10 Nov 2020 12:00), Max: 36.3 (10 Nov 2020 12:00)  T(F): 97.3 (10 Nov 2020 12:00), Max: 97.3 (10 Nov 2020 12:00)  HR: 85 (10 Nov 2020 13:00) (74 - 91)  BP: 106/62 (10 Nov 2020 13:00) (92/65 - 121/65)  BP(mean): 72 (10 Nov 2020 13:00) (65 - 79)  RR: 23 (10 Nov 2020 13:00) (23 - 38)  SpO2: 98% (10 Nov 2020 13:00) (94% - 100%)  General: Patient awake, responsive, minimally verbal, cachectic, follows very simple commands, ill appearing, +weak, NAD  Karnofsky Performance Score/Palliative Performance Status Version2: 20%    HEENT: +temporal wasting  Lungs:  On nasal canula. No signs of respiratory distress.    CV: S1S2  GI: incontinent, NGT  :   velazquez  Musculoskeletal: +generalized weakness, dependent on ADLs  Skin: +anasarca improving; coccyx DTI  Neuro: Patient awake, responsive, minimally verbal, cachectic, follows very simple command  Oral intake ability: unable/only mouth care    Diet: NPO, TF via NGT      LABS:                          7.9    8.40  )-----------( 126      ( 10 Nov 2020 05:42 )             23.1     11-10    140  |  103  |  31<H>  ----------------------------<  100<H>  3.6   |  26  |  1.85<H>    Ca    8.1<L>      10 Nov 2020 05:42  Phos  2.9     11-10  Mg     1.8     11-10    TPro  5.1<L>  /  Alb  2.4<L>  /  TBili  5.5<H>  /  DBili  x   /  AST  151<H>  /  ALT  70<H>  /  AlkPhos  87  11-10    RADIOLOGY & ADDITIONAL STUDIES:  thiago< from: Xray Chest 1 View- PORTABLE-Routine (Xray Chest 1 View- PORTABLE-Routine in AM.) (11.10.20 @ 07:57) >  EXAM:  XR CHEST PORTABLE ROUTINE 1V                        PROCEDURE DATE:  11/10/2020    INTERPRETATION:  Chest one view  HISTORY: Aspiration pneumonia  COMPARISON STUDY: 11/9/2020  Frontal expiratory view of the chest shows the heart to be similar in size. Feeding tube reaches the distal stomach or duodenum. Right jugular line remains present.  The lungs show show progression of upper lobe infiltrates and right pleural effusion. Left effusion is grossly similar. There is no evidence of pneumothorax.    IMPRESSION:  Infiltrates and effusions as noted.  < end of copied text >    ADVANCE DIRECTIVES: MOLST: DNR

## 2020-11-10 NOTE — PROGRESS NOTE ADULT - PROBLEM SELECTOR PLAN 3
2/2 recurrent hospitalizations, s/p JOHNATHAN. +multi-organ failure. Was ambulatory short distances at home with increasing weakness and +significant weight loss as per family. Patient was intubated since 10/24, extubated 11/9, dependent on ADLs, + skin failure. PT and ST to eval. Patient eligible for hospice services.

## 2020-11-10 NOTE — PROGRESS NOTE ADULT - SUBJECTIVE AND OBJECTIVE BOX
extubated  awake, alert,  no fever  BP normal    ROS:  Negative except for:    MEDICATIONS  (STANDING):  chlorhexidine 2% Cloths 1 Application(s) Topical daily  furosemide   Injectable 80 milliGRAM(s) IV Push every 12 hours  heparin   Injectable 5000 Unit(s) SubCutaneous every 12 hours  lactulose Syrup 10 Gram(s) Oral daily  levoFLOXacin IVPB      levoFLOXacin IVPB 750 milliGRAM(s) IV Intermittent every 48 hours  midodrine. 10 milliGRAM(s) Oral three times a day  nystatin Powder 1 Application(s) Topical two times a day  pantoprazole  Injectable 40 milliGRAM(s) IV Push two times a day  rifAXIMin 550 milliGRAM(s) Oral two times a day  vancomycin  IVPB 500 milliGRAM(s) IV Intermittent every 24 hours    MEDICATIONS  (PRN):  sodium chloride 0.9% lock flush 10 milliLiter(s) IV Push every 1 hour PRN Pre/post blood products, medications, blood draw, and to maintain line patency      Allergies    No Known Allergies    Intolerances        Vital Signs Last 24 Hrs  T(C): 35.8 (10 Nov 2020 08:00), Max: 36.2 (10 Nov 2020 00:00)  T(F): 96.5 (10 Nov 2020 08:00), Max: 97.1 (10 Nov 2020 00:00)  HR: 74 (10 Nov 2020 08:00) (74 - 94)  BP: 117/67 (10 Nov 2020 08:00) (78/54 - 117/67)  BP(mean): 77 (10 Nov 2020 08:00) (60 - 85)  RR: 32 (10 Nov 2020 08:00) (20 - 38)  SpO2: 100% (10 Nov 2020 08:00) (94% - 100%)    PHYSICAL EXAM  General: adult in NAD  HEENT: clear oropharynx, anicteric sclera, pink conjunctiva  Neck: supple  CV: normal S1/S2 with no murmur rubs or gallops  Lungs: positive air movement b/l ant lungs,clear to auscultation, no wheezes, no rales  Abdomen: soft non-tender non-distended, no hepatosplenomegaly  Ext: no clubbing cyanosis or edema  Skin: no rashes and no petechiae  Neuro: alert and oriented X 4, no focal deficits  LABS:                          7.9    8.40  )-----------( 126      ( 10 Nov 2020 05:42 )             23.1         Mean Cell Volume : 88.5 fl  Mean Cell Hemoglobin : 30.3 pg  Mean Cell Hemoglobin Concentration : 34.2 gm/dL  Auto Neutrophil # : 6.47 K/uL  Auto Lymphocyte # : 0.69 K/uL  Auto Monocyte # : 0.88 K/uL  Auto Eosinophil # : 0.22 K/uL  Auto Basophil # : 0.07 K/uL  Auto Neutrophil % : 77.1 %  Auto Lymphocyte % : 8.2 %  Auto Monocyte % : 10.5 %  Auto Eosinophil % : 2.6 %  Auto Basophil % : 0.8 %    Serial CBC  Hematocrit 23.1  Hemoglobin 7.9  Plat 126  RBC 2.61  WBC 8.40  Serial CBC  Hematocrit 24.8  Hemoglobin 8.8  Plat 137  RBC 2.83  WBC 7.43  Serial CBC  Hematocrit 23.3  Hemoglobin 8.2  Plat 128  RBC 2.67  WBC 6.62  Serial CBC  Hematocrit 24.2  Hemoglobin 8.2  Plat 122  RBC 2.75  WBC 6.83  Serial CBC  Hematocrit 19.7  Hemoglobin 6.7  Plat 134  RBC 2.18  WBC 7.74  Serial CBC  Hematocrit 19.4  Hemoglobin 6.6  Plat 139  RBC 2.15  WBC 7.08    11-10    140  |  103  |  31<H>  ----------------------------<  100<H>  3.6   |  26  |  1.85<H>    Ca    8.1<L>      10 Nov 2020 05:42  Phos  2.9     11-10  Mg     1.8     11-10    TPro  5.1<L>  /  Alb  2.4<L>  /  TBili  5.5<H>  /  DBili  x   /  AST  151<H>  /  ALT  70<H>  /  AlkPhos  87  11-10      PT/INR - ( 10 Nov 2020 05:42 )   PT: 13.5 sec;   INR: 1.14 ratio         PTT - ( 10 Nov 2020 05:42 )  PTT:39.8 sec              BLOOD SMEAR INTERPRETATION:       RADIOLOGY & ADDITIONAL STUDIES:

## 2020-11-10 NOTE — SWALLOW BEDSIDE ASSESSMENT ADULT - SWALLOW EVAL: RECOMMENDED FEEDING/EATING TECHNIQUES
oral hygiene/small sips/bites/suction if warranted/maintain upright posture during/after eating for 30 mins/allow for swallow between intakes/alternate food with liquid/position upright (90 degrees)

## 2020-11-10 NOTE — PROGRESS NOTE ADULT - NUTRITIONAL ASSESSMENT
This patient has been assessed with a concern for Malnutrition and has been determined to have a diagnosis/diagnoses of Severe protein-calorie malnutrition.    This patient is being managed with:   Diet NPO after Midnight-     NPO Start Date: 10-Nov-2020   NPO Start Time: 23:59  Entered: Nov 10 2020  5:48PM    Diet Dysphagia 1 Pureed-Nectar Consistency Fluid-  Entered: Nov 10 2020  4:00PM

## 2020-11-10 NOTE — PROGRESS NOTE ADULT - SUBJECTIVE AND OBJECTIVE BOX
Pt with sepsis with CT findings possible ischemic colitis vs infectious etiology. Pt with sacral decubitus.  ICU Vital Signs Last 24 Hrs  T(C): 37 (10 Nov 2020 15:00), Max: 37 (10 Nov 2020 15:00)  T(F): 98.6 (10 Nov 2020 15:00), Max: 98.6 (10 Nov 2020 15:00)  HR: 90 (10 Nov 2020 17:00) (74 - 91)  BP: 104/64 (10 Nov 2020 17:00) (92/65 - 121/65)  BP(mean): 73 (10 Nov 2020 17:00) (67 - 79)  ABP: --  ABP(mean): --  RR: 33 (10 Nov 2020 16:00) (23 - 38)  SpO2: 96% (10 Nov 2020 17:00) (95% - 100%)    sacral decubiti                          7.9    8.40  )-----------( 126      ( 10 Nov 2020 05:42 )             23.1     11-10    140  |  103  |  31<H>  ----------------------------<  100<H>  3.6   |  26  |  1.85<H>    Ca    8.1<L>      10 Nov 2020 05:42  Phos  2.9     11-10  Mg     1.8     11-10    TPro  5.1<L>  /  Alb  2.4<L>  /  TBili  5.5<H>  /  DBili  x   /  AST  151<H>  /  ALT  70<H>  /  AlkPhos  87  11-10      npo after midnight  ivf  type and screen    please indicate on chart if pt is medically optimal for Or anesthesia for debridement of sacral decubitus.  If pt is not optimized then please reconsult when pt is medically optimized for anesthesia

## 2020-11-10 NOTE — PROGRESS NOTE ADULT - NUTRITIONAL ASSESSMENT
This patient has been assessed with a concern for Malnutrition and has been determined to have a diagnosis/diagnoses of Severe protein-calorie malnutrition.    This patient is being managed with:   Diet Dysphagia 1 Pureed-Nectar Consistency Fluid-  Entered: Nov 10 2020  4:00PM

## 2020-11-10 NOTE — PROGRESS NOTE ADULT - NUTRITIONAL ASSESSMENT
This patient has been assessed with a concern for Malnutrition and has been determined to have a diagnosis/diagnoses of Severe protein-calorie malnutrition.    This patient is being managed with:   Diet NPO with Tube Feed-  Tube Feeding Modality: Nasogastric  Nepro with Carb Steady  Total Volume for 24 Hours (mL): 960  Continuous  Starting Tube Feed Rate {mL per Hour}: 10  Increase Tube Feed Rate by (mL): 10     Every 4 hours  Until Goal Tube Feed Rate (mL per Hour): 40  Tube Feed Duration (in Hours): 24  Tube Feed Start Time: 12:00  Entered: Nov 5 2020 10:38AM

## 2020-11-10 NOTE — PROGRESS NOTE ADULT - ASSESSMENT
as per icu  65y Female from home, lives with daughter, ambulates with walker with PMH of recurrent SBO's, s/p exp lap, SB resection in 2015, ex lap, ALBINA in 2018, DVT, PE, on Xarelto, IVC filter, chronic leg swelling, and anasarca, came in to the ED due to hypotension during office visit. Patient was found to be bacteremic with bacteroids fragilis. Admitted in ICU due to hypotension and hypothermia. On vancomycin and levo . BCx X2 negative. Sputum positive for MRSA and Stenotrophomonas.     Diagnosis:  >Encephalopathy  >Sepsis  >Bacteremia  >Anemia  >DVT/PE  >Transaminitis  >Aspiration pneumonia    --------------------------------------------Neuro--------------------------------------  -She is AAOx2 at baseline on evaluation  -Encephalopathic and got intubated on 10/24, extubated on 11/9  -Ammonia trending down from 152 > 130 > 116 >103>131>126>90>77----> less than 10  -On lactulose 30g daily and rifaximin, goal BM 2-3   -INR>1.63>1.71>1.91>1.98> 1.44  -Pt with multiorgan failure secondary to septic shock  -Hold CT head for now as AMS was most likely due to hepatic encephalopathy and pt didn't have any focal neurological deficit when she started having AMS  -pupils are reactive to light b/l, patient can follow commands and communicates by responding, does not talk still  -  patint off precedex  -Off to Precedex , RR 20      -------------------------------------CVS-------------------------------  -Patient was hypotensive secondary septic shoc  started on levophed currently can maintain BP , hence off pressors   -On abx Levaquin renally dosed , Vancomycin 500mg q24hrs dosing with trough   -NG tube feeding  -Midodrine 10 mg TID  -RIJ for pressors   -Lasix 80mg BID for improving UO  -previous ECHO 3-4 months back showed normal EF but new ECHO showed EF 15-20% with severe left ventricular dysfunction  -pt with multiorgan dysfunction sec to septic shock , mixed venous gas from RIJ showed borderline low O2 saturation , mixed picture of septic /cardiogenic shock   - anticardiolipin antibodies IgG IgM , ANCA  -Anticardiolipin abs +ve      --------------------------------Respiratory----------------------  - Passed SBT and extubated on 11/9  -Intubated and sedated on levophed for hypotesnion  - CXR: small bilateral pleural effusion and atelectasia  -CT showed mild b/l pleural effusion and left lower lobe consolidation in ICU on day 2  -aspiration pneumonia  - sputum culture grew MRSA and Stenotrophomonas (sensitive to levo)  - will c/w levaquin 500 q24 hours ,   - will c/w vancomycin 500 a24hrs dosed with vanc though   --repeat sputum culture neg  -ID Dr. Patricio    ----------------------------------------Gastrointestinal--------------------------------------  -Patient was bacteremic with bacteroids fragilis, likely secondary from intra abdominal source.   -Patient had multiple SBO in past. 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 Likely due to sepsis vs hepatic failure sec to budd Chiari  -BCx X 2 negative.  -ammonia trends down from 150 to 130 >116>103>131>126>90> 77,--<10 on lactulose and rifaximin   -monitor BM, titrate lactulose top 2-3 BM qd   -GI, DR Chatterjee deferred doing colonoscopy due to multiple comorbidities, FOBT positive on 10/15  -Dr Potts recs has been noted,    -----------------------------------------ID---------------------------------------  -Patient was bacteremic with bacteroids likely GI source.  -Lactate is normal 1.3 but trends up to 2.3 >3.7  -Procalcitonin 0.79  -On Levaquin and held vanc due to high trough ,   - BP on the lower side, pt is on Levophed baby dose and midodrine 10mg TID  -Monitor vitals Q4  - Given 1 dose Vancomycin 500mg 11/6 PM  - F/U Vanc trough 10PM    ---------------------------------------------Nephro-------------------------------------  - hypernatremia improved   - on free water 120 ml q6h  -TF  - Creatinine trended down slightly today , will avoid nephrotoxin , patient in MOF   -Monitor UO,   - on Lasix 80 BID   - UO and anasarca improved    ----------------------------------------Hem Onc----------------------------------  -Has h/o DVt and PE in past. Patient is on Xarelto at home  -Patient has anemia  -anemia of chronic disease  -Hgb 6.8>1PRBC>8.2> 7.9>7.5>7.4>7.6  -plt trended up to 96, no active bleeding  - will start on prophylactic dose Ac Heparin 5000 q12 hrs per dr rivera recommendation   -Folate and B12 normal.   -Dr. Rivera recs following, s/p thiamine 3 doses  - will check INR daily     ----------------------------------------Endo--------------------------------  -HgbA1c 4  -Fs q6h    ----------------------------------------Prophylaxis----------------------------  DVT: heparin sq 5000 q12 hrs  GI: PPI    ---------------------------------------GOC---------------------------  -DNR   -Pt with multi organ failure secondary to septic shock with poor prognosis  -Palliative is on board  -family discussion next week

## 2020-11-10 NOTE — PROGRESS NOTE ADULT - SUBJECTIVE AND OBJECTIVE BOX
Patient is seen and examined at the bed side, is afebrile.         REVIEW OF SYSTEMS: All other review systems are negative        ALLERGIES: No Known Allergies      ICU Vital Signs Last 24 Hrs  T(C): 37 (10 Nov 2020 15:00), Max: 37 (10 Nov 2020 15:00)  T(F): 98.6 (10 Nov 2020 15:00), Max: 98.6 (10 Nov 2020 15:00)  HR: 90 (10 Nov 2020 17:00) (74 - 91)  BP: 104/64 (10 Nov 2020 17:00) (92/65 - 121/65)  BP(mean): 73 (10 Nov 2020 17:00) (67 - 79)  ABP: --  ABP(mean): --  RR: 33 (10 Nov 2020 16:00) (23 - 38)  SpO2: 96% (10 Nov 2020 17:00) (95% - 100%)        PHYSICAL EXAM:  GENERAL: s/p extubated, on oxygen via NC  CHEST/LUNG: Not using accessory muscles   HEART: s1 and s2 present  ABDOMEN:  Nontender and  Nondistended  EXTREMITIES: B/L UE edematous  CNS: Awake and Alert           LABS:                          7.9    8.40  )-----------( 126      ( 10 Nov 2020 05:42 )             23.1                                   8.8    7.43  )-----------( 137      ( 09 Nov 2020 05:58 )             24.8         11-10    140  |  103  |  31<H>  ----------------------------<  100<H>  3.6   |  26  |  1.85<H>    Ca    8.1<L>      10 Nov 2020 05:42  Phos  2.9     11-10  Mg     1.8     11-10    TPro  5.1<L>  /  Alb  2.4<L>  /  TBili  5.5<H>  /  DBili  x   /  AST  151<H>  /  ALT  70<H>  /  AlkPhos  87  11-10     11-09    142  |  106  |  34<H>  ----------------------------<  118<H>  3.3<L>   |  27  |  2.01<H>    Ca    8.4      09 Nov 2020 05:58  Phos  2.7     11-09  Mg     1.9     11-09    TPro  5.1<L>  /  Alb  2.7<L>  /  TBili  6.1<H>  /  DBili  x   /  AST  144<H>  /  ALT  70<H>  /  AlkPhos  92  11-09 11-06    138  |  104  |  37<H>  ----------------------------<  81  3.9   |  25  |  2.37<H>    Ca    8.1<L>      06 Nov 2020 06:20  Phos  3.4     11-06  Mg     2.0     11-06    TPro  5.1<L>  /  Alb  2.8<L>  /  TBili  9.2<H>  /  DBili  x   /  AST  233<H>  /  ALT  99<H>  /  AlkPhos  96  11-06      Vancomycin Level, Trough (11.07.20 @ 21:49)   Vancomycin Level, Trough: 16.1:     Vancomycin Level, Trough (11.07.20 @ 07:08)   Vancomycin Level, Trough: 19.5    vanVancomycin Level, Trough (11.06.20 @ 06:20)   Vancomycin Level, Trough: 19.9:     Vancomycin Level, Trough (11.04.20 @ 06:12)   Vancomycin Level, Trough: 31.0:     Vancomycin Level, Trough (11.03.20 @ 04:11)   Vancomycin Level, Trough: 16.1:      Procalcitonin, Serum (10.15.20 @ 11:48)   Procalcitonin, Serum: 0.16: Procalcitonin (PCT) Interpretation (ng/mL) - Diagnosis of systemic   bacterial infection/sepsis         MEDICATIONS  (STANDING):    chlorhexidine 2% Cloths 1 Application(s) Topical daily  furosemide   Injectable 80 milliGRAM(s) IV Push every 12 hours  heparin   Injectable 5000 Unit(s) SubCutaneous every 12 hours  lactulose Syrup 10 Gram(s) Oral daily  levoFLOXacin IVPB      levoFLOXacin IVPB 750 milliGRAM(s) IV Intermittent every 48 hours  midodrine. 10 milliGRAM(s) Oral three times a day  nystatin Powder 1 Application(s) Topical two times a day  pantoprazole  Injectable 40 milliGRAM(s) IV Push two times a day  rifAXIMin 550 milliGRAM(s) Oral two times a day  vancomycin  IVPB 500 milliGRAM(s) IV Intermittent every 24 hours        RADIOLOGY & ADDITIONAL TESTS:    11/4/20 : Xray Chest 1 View- PORTABLE-Routine (Xray Chest 1 View- PORTABLE-Routine in AM.) (11.04.20 @ 10:59) Reexpansion of the left lung with residual left pleural effusion and/or infiltrate.  Right pulmonary edema unchanged.  Tubes and catheters in satisfactory position.      10/25/20: Xray Chest 1 View- PORTABLE-Routine (Xray Chest 1 View- PORTABLE-Routine in AM.) (10.25.20 @ 09:21) Frontal expiratory view of the chest shows the heart to be similar in size. Endotracheal tube, right jugular line and feeding tube remain present.    The lungs show similar left upper lobe infiltrate with progression of right perihilar infiltrate. Pleural effusions are similar. There is no evidence of pneumothorax.      10/23/20 : Xray Chest 1 View-PORTABLE IMMEDIATE (Xray Chest 1 View-PORTABLE IMMEDIATE .) (10.23.20 @ 19:09) Feeding tube tip near GE junction as noted. Questionable right upper lobe infiltrate. Follow-up study is recommended as clinically warranted.      10/21/20 : CT Abdomen and Pelvis w/ Oral Cont and w/ IV Cont (10.21.20 @ 15:59) Mural thickening of the left colon and rectum. Liquid stool in the colon. Apparent segmental mural thickening of the mid to distal small bowel and the proximal duodenum. Findings may represent nonspecific enterocolitis, noninfectious inflammatory bowel disease, or ischemic bowel. Clinical correlation is recommended. The celiac axis artery, SMA, and KINA are patent without stenosis.    Dilatation of the mid small bowel is again noted. Oral contrast has reached the terminal ileum. Findings may represent small bowel obstruction, or ileus related to nonspecific enterocolitis.   Cholelithiasis.    Moderate to large ascites in the abdomen, increased since the previous examination. 5 mm nonspecific noncalcified left upper lobe lung nodule; if the patient's is in the high risk category (i.e. smoker), follow-up chest CT may be pursued in 12 months to ensure stability.    Combination of atelectasis and consolidation in the left lower lobe.    Possible 1.0 cm hypodense lesion in the left lobe of the thyroid. Thyroid ultrasound may be pursued for further evaluation.   Mild bilateral pleural effusions.    Aging determinate compression fracture at T5 vertebra.        10/13/20 : CT Abdomen and Pelvis w/ IV Cont (10.13.20 @ 18:50) Diffuse dilatation of small bowel loops without a clear transition is slightly increased, likely an ileus.  Decreased moderate ascites.    1.5 cm pancreatic versus peripancreatic soft tissue nodule    10/13/20 : Xray Chest 1 View- PORTABLE-Urgent (Xray Chest 1 View- PORTABLE-Urgent .) (10.13.20 @ 16:34) Clear lungs.          MICROBIOLOGY DATA:    Culture - Sputum . (10.26.20 @ 00:26)   - Ampicillin/Sulbactam: R <=8/4   - Cefazolin: R >16   - Ceftazidime: I 16   - Clindamycin: R >4   - Erythromycin: R >4   - Gentamicin: S <=1 Should not be used as monotherapy   - Levofloxacin: S <=0.5   - Linezolid: S 2   - Oxacillin: R >2   - Penicillin: R >8   - RIF- Rifampin: S <=1 Should not be used as monotherapy   - Tetra/Doxy: S <=1   - Trimethoprim/Sulfamethoxazole: S <=0.5/9.5   - Trimethoprim/Sulfamethoxazole: S <=0.5/9.5   - Vancomycin: S 1       MRSA/MSSA PCR (10.21.20 @ 09:41)   MRSA PCR Result.: Detected:       Culture - Blood (10.20.20 @ 22:09)   Specimen Source: .Blood Blood   Culture Results:  No growth to date.     Culture - Blood (10.20.20 @ 22:09)   Specimen Source: .Blood Blood   Culture Results:   No growth to date.     Culture - Blood in AM (10.16.20 @ 10:13)   Specimen Source: .Blood Blood-Peripheral   Culture Results: No growth to date.     Culture - Blood in AM (10.16.20 @ 10:13)   Specimen Source: .Blood Blood-Peripheral   Culture Results: No growth to date.     Culture - Blood (10.13.20 @ 22:23)   Growth in anaerobic bottle: Gram Negative Rods   Specimen Source: .Blood Blood-Peripheral   Organism: Blood Culture PCR   Culture Results: Growth in anaerobic bottle: Bacteroides fragilis   "Susceptibilities not performed"     Culture - Blood (10.13.20 @ 22:23)   Specimen Source: .Blood Blood-Peripheral   Culture Results:   No growth to date.              Patient is seen and examined at the bed side, is afebrile.  She remains extubated and saturating well on oxygen via NC. The kidney function is improving.        REVIEW OF SYSTEMS: All other review systems are negative        ALLERGIES: No Known Allergies        ICU Vital Signs Last 24 Hrs  T(C): 37 (10 Nov 2020 15:00), Max: 37 (10 Nov 2020 15:00)  T(F): 98.6 (10 Nov 2020 15:00), Max: 98.6 (10 Nov 2020 15:00)  HR: 90 (10 Nov 2020 17:00) (74 - 91)  BP: 104/64 (10 Nov 2020 17:00) (92/65 - 121/65)  BP(mean): 73 (10 Nov 2020 17:00) (67 - 79)  ABP: --  ABP(mean): --  RR: 33 (10 Nov 2020 16:00) (23 - 38)  SpO2: 96% (10 Nov 2020 17:00) (95% - 100%)        PHYSICAL EXAM:  GENERAL: remains extubated, on oxygen via NC  CHEST/LUNG: Not using accessory muscles   HEART: s1 and s2 present  ABDOMEN:  Nontender and  Nondistended  EXTREMITIES: B/L UE edematous  CNS: Awake and Alert           LABS:                          7.9    8.40  )-----------( 126      ( 10 Nov 2020 05:42 )             23.1                                   8.8    7.43  )-----------( 137      ( 09 Nov 2020 05:58 )             24.8         11-10    140  |  103  |  31<H>  ----------------------------<  100<H>  3.6   |  26  |  1.85<H>    Ca    8.1<L>      10 Nov 2020 05:42  Phos  2.9     11-10  Mg     1.8     11-10    TPro  5.1<L>  /  Alb  2.4<L>  /  TBili  5.5<H>  /  DBili  x   /  AST  151<H>  /  ALT  70<H>  /  AlkPhos  87  11-10     11-09    142  |  106  |  34<H>  ----------------------------<  118<H>  3.3<L>   |  27  |  2.01<H>    Ca    8.4      09 Nov 2020 05:58  Phos  2.7     11-09  Mg     1.9     11-09    TPro  5.1<L>  /  Alb  2.7<L>  /  TBili  6.1<H>  /  DBili  x   /  AST  144<H>  /  ALT  70<H>  /  AlkPhos  92  11-09 11-06    138  |  104  |  37<H>  ----------------------------<  81  3.9   |  25  |  2.37<H>    Ca    8.1<L>      06 Nov 2020 06:20  Phos  3.4     11-06  Mg     2.0     11-06    TPro  5.1<L>  /  Alb  2.8<L>  /  TBili  9.2<H>  /  DBili  x   /  AST  233<H>  /  ALT  99<H>  /  AlkPhos  96  11-06      Vancomycin Level, Trough (11.07.20 @ 21:49)   Vancomycin Level, Trough: 16.1:     Vancomycin Level, Trough (11.07.20 @ 07:08)   Vancomycin Level, Trough: 19.5    vanVancomycin Level, Trough (11.06.20 @ 06:20)   Vancomycin Level, Trough: 19.9:     Vancomycin Level, Trough (11.04.20 @ 06:12)   Vancomycin Level, Trough: 31.0:     Vancomycin Level, Trough (11.03.20 @ 04:11)   Vancomycin Level, Trough: 16.1:      Procalcitonin, Serum (10.15.20 @ 11:48)   Procalcitonin, Serum: 0.16: Procalcitonin (PCT) Interpretation (ng/mL) - Diagnosis of systemic   bacterial infection/sepsis         MEDICATIONS  (STANDING):    chlorhexidine 2% Cloths 1 Application(s) Topical daily  furosemide   Injectable 80 milliGRAM(s) IV Push every 12 hours  heparin   Injectable 5000 Unit(s) SubCutaneous every 12 hours  lactulose Syrup 10 Gram(s) Oral daily  levoFLOXacin IVPB      levoFLOXacin IVPB 750 milliGRAM(s) IV Intermittent every 48 hours  midodrine. 10 milliGRAM(s) Oral three times a day  nystatin Powder 1 Application(s) Topical two times a day  pantoprazole  Injectable 40 milliGRAM(s) IV Push two times a day  rifAXIMin 550 milliGRAM(s) Oral two times a day  vancomycin  IVPB 500 milliGRAM(s) IV Intermittent every 24 hours        RADIOLOGY & ADDITIONAL TESTS:    11/4/20 : Xray Chest 1 View- PORTABLE-Routine (Xray Chest 1 View- PORTABLE-Routine in AM.) (11.04.20 @ 10:59) Reexpansion of the left lung with residual left pleural effusion and/or infiltrate.  Right pulmonary edema unchanged.  Tubes and catheters in satisfactory position.      10/25/20: Xray Chest 1 View- PORTABLE-Routine (Xray Chest 1 View- PORTABLE-Routine in AM.) (10.25.20 @ 09:21) Frontal expiratory view of the chest shows the heart to be similar in size. Endotracheal tube, right jugular line and feeding tube remain present.    The lungs show similar left upper lobe infiltrate with progression of right perihilar infiltrate. Pleural effusions are similar. There is no evidence of pneumothorax.      10/23/20 : Xray Chest 1 View-PORTABLE IMMEDIATE (Xray Chest 1 View-PORTABLE IMMEDIATE .) (10.23.20 @ 19:09) Feeding tube tip near GE junction as noted. Questionable right upper lobe infiltrate. Follow-up study is recommended as clinically warranted.      10/21/20 : CT Abdomen and Pelvis w/ Oral Cont and w/ IV Cont (10.21.20 @ 15:59) Mural thickening of the left colon and rectum. Liquid stool in the colon. Apparent segmental mural thickening of the mid to distal small bowel and the proximal duodenum. Findings may represent nonspecific enterocolitis, noninfectious inflammatory bowel disease, or ischemic bowel. Clinical correlation is recommended. The celiac axis artery, SMA, and KINA are patent without stenosis.    Dilatation of the mid small bowel is again noted. Oral contrast has reached the terminal ileum. Findings may represent small bowel obstruction, or ileus related to nonspecific enterocolitis.   Cholelithiasis.    Moderate to large ascites in the abdomen, increased since the previous examination. 5 mm nonspecific noncalcified left upper lobe lung nodule; if the patient's is in the high risk category (i.e. smoker), follow-up chest CT may be pursued in 12 months to ensure stability.    Combination of atelectasis and consolidation in the left lower lobe.    Possible 1.0 cm hypodense lesion in the left lobe of the thyroid. Thyroid ultrasound may be pursued for further evaluation.   Mild bilateral pleural effusions.    Aging determinate compression fracture at T5 vertebra.        10/13/20 : CT Abdomen and Pelvis w/ IV Cont (10.13.20 @ 18:50) Diffuse dilatation of small bowel loops without a clear transition is slightly increased, likely an ileus.  Decreased moderate ascites.    1.5 cm pancreatic versus peripancreatic soft tissue nodule    10/13/20 : Xray Chest 1 View- PORTABLE-Urgent (Xray Chest 1 View- PORTABLE-Urgent .) (10.13.20 @ 16:34) Clear lungs.          MICROBIOLOGY DATA:    Culture - Sputum . (10.26.20 @ 00:26)   - Ampicillin/Sulbactam: R <=8/4   - Cefazolin: R >16   - Ceftazidime: I 16   - Clindamycin: R >4   - Erythromycin: R >4   - Gentamicin: S <=1 Should not be used as monotherapy   - Levofloxacin: S <=0.5   - Linezolid: S 2   - Oxacillin: R >2   - Penicillin: R >8   - RIF- Rifampin: S <=1 Should not be used as monotherapy   - Tetra/Doxy: S <=1   - Trimethoprim/Sulfamethoxazole: S <=0.5/9.5   - Trimethoprim/Sulfamethoxazole: S <=0.5/9.5   - Vancomycin: S 1       MRSA/MSSA PCR (10.21.20 @ 09:41)   MRSA PCR Result.: Detected:       Culture - Blood (10.20.20 @ 22:09)   Specimen Source: .Blood Blood   Culture Results:  No growth to date.     Culture - Blood (10.20.20 @ 22:09)   Specimen Source: .Blood Blood   Culture Results:   No growth to date.     Culture - Blood in AM (10.16.20 @ 10:13)   Specimen Source: .Blood Blood-Peripheral   Culture Results: No growth to date.     Culture - Blood in AM (10.16.20 @ 10:13)   Specimen Source: .Blood Blood-Peripheral   Culture Results: No growth to date.     Culture - Blood (10.13.20 @ 22:23)   Growth in anaerobic bottle: Gram Negative Rods   Specimen Source: .Blood Blood-Peripheral   Organism: Blood Culture PCR   Culture Results: Growth in anaerobic bottle: Bacteroides fragilis   "Susceptibilities not performed"     Culture - Blood (10.13.20 @ 22:23)   Specimen Source: .Blood Blood-Peripheral   Culture Results:   No growth to date.

## 2020-11-10 NOTE — PROGRESS NOTE ADULT - PROBLEM SELECTOR PLAN 1
2/2 shock/bacteremia; involving lungs (patient intubated 10/24; extubated 11/9; now on nasal canula); heart (echo shows EF 15-20%; liver (TB 5.5, , ALT 70); kidneys (DAHLIA, Cr 1.85). Patient extubated 11/9; on nasal canula. Continues midodrine, lasix, lactulose, rifaximin, IV abx. 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 Overall, patient with very poor prognosis. Patient eligible for hospice services. Daughter/Jus is surrogate; DNR on file.

## 2020-11-10 NOTE — SWALLOW BEDSIDE ASSESSMENT ADULT - SWALLOW EVAL: DIAGNOSIS
Pt p/w oropharyngeal dysphagia c/b tongue pumping, decreased A-P transport, delayed oral transit time, decreased laryngeal elevation, and multiple swallows with s/s of penetration/aspiration as cough post thin liquid PO trial.

## 2020-11-10 NOTE — SWALLOW BEDSIDE ASSESSMENT ADULT - PHARYNGEAL PHASE
Decreased laryngeal elevation/Multiple swallows Within functional limits Decreased laryngeal elevation/Cough post oral intake

## 2020-11-10 NOTE — PROGRESS NOTE ADULT - ASSESSMENT
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  BC grew bacteroides.    Problem/Recommendation - 1:  Problem: Bacteremia. Recommendation: bacteroides  most likely GI origin  on antibiotics  she had hypothermia and hypotension and elevated lactic acid  in ICU now, she is extubated today, bp is more stable  multiorgan failure but improving  christel is trending down now, cholestasis, etiology?  Cr continue to trend down slowly,  may be due to poor perfusion  Problem/Recommendation - 2:·  Problem: Anemia.  Recommendation: ferritin, b12 folate normalno hemolysis  most likley due to malnutrition from short bowel syndrome.she also had GIB multiple times before. Problem/Recommendation - 3:·  Problem: Acute deep vein thrombosis (DVT) of other vein of upper extremity.  Recommendation: she has been on xarelto for 2 years.  DVT at left arm and left leg before.  in all CT scan i did not see report of IVC or hepatic vein thrombosisoff xarelto and lovenox due to elevated PT/PTT. elevated PT/PTT due to malnutrition and antibiotics causing VITK def  now it is mostly recovered  it begins to rise again, correctable by mixing study  probably due to liver failure this time  she develops DVT very quickly while off ACneed DVT prophylaxis, sq heparin  5. thrombocytopenia  new onset, most likely from sepsis or medication, procal is rising  the other possibilties causing multiorgan failure, including thrombocytopenia,  such as autoimmune, vasculitis, HLH, catastrophic APS(less likely)  platelet is trending up, 150 today  6. CHF, new onset global hypokinesia,   cardiomyopathy, ?etiology  will give thiamin

## 2020-11-10 NOTE — PROGRESS NOTE ADULT - PROBLEM SELECTOR PLAN 2
Dx per dietary; 2/2 comorbidities, acute illness. Albumin: 2.4, + temporal wasting, + anasarca, + skin failure. Continues TF via NGT. High risk for skin breakdown. Family reported significant weight loss prior to hospitalization.

## 2020-11-10 NOTE — SWALLOW BEDSIDE ASSESSMENT ADULT - ASR SWALLOW ASPIRATION MONITOR
oral hygiene/upper respiratory infection/change of breathing pattern/cough/fever/throat clearing/pneumonia/position upright (90Y)/gurgly voice

## 2020-11-10 NOTE — SWALLOW BEDSIDE ASSESSMENT ADULT - SLP PERTINENT HISTORY OF CURRENT PROBLEM
65y F from home, lives with daughter, ambulates with walker with PMH of recurrent SBO's, s/p exp lap, SB resection in 2015, ex lap, ALBINA in 2018, DVT, PE, on Xarelto, IVC filter, chronic leg swelling, and anasarca, came in to the ED due to hypotension during office visit. Patient was found to be bacteremic with bacteroids fragilis. Admitted in ICU due to hypotension and hypothermia. On vancomycin and levo . BCx X2 negative. Sputum positive for MRSA and Stenotrophomonas. CT 10/21 suggested  non-specific enterocolitis, noninfectious inflammatory bowel disease or ischemic bowel along with ileus, and concern for SBO given mild luminal narrowing at distal small bowel. Patient remained hypotensive, hypothermic, requiring pressor support, septic and became encephalopathic and got intubated on 10/24. Extubated 11/9. Ng tube in place. no

## 2020-11-10 NOTE — SWALLOW BEDSIDE ASSESSMENT ADULT - ORAL PHASE
Decreased anterior-posterior movement of the bolus/tongue pumping/Delayed oral transit time Delayed oral transit time/Decreased anterior-posterior movement of the bolus Decreased anterior-posterior movement of the bolus/Delayed oral transit time/tongue pumping Within functional limits

## 2020-11-10 NOTE — PROGRESS NOTE ADULT - PROBLEM SELECTOR PLAN 4
Patient's daughter is primary surrogate. MOLST previously completed: DNR on file. Recommend family meeting with patient and daughter to further discuss goals of care in next 1-2 days to encourage patient participation. Patient eligible for hospice services.

## 2020-11-10 NOTE — SWALLOW BEDSIDE ASSESSMENT ADULT - SLP GENERAL OBSERVATIONS
Pt AA+Ox3 (aphonic but intelligible with repetition); HOB 60 degrees (right sided lean; bilateral upper extremity weakness w/edema). Pt coughed yellow mucous warranting suctioning and demonstrated audible, shallow congested breaths throughout exam.

## 2020-11-10 NOTE — SWALLOW BEDSIDE ASSESSMENT ADULT - MODE OF PRESENTATION
fed by clinician/spoon/hand over hand/self fed self fed/hand over hand/spoon/fed by clinician self fed/cup/fed by clinician/hand over hand

## 2020-11-10 NOTE — PROGRESS NOTE ADULT - SUBJECTIVE AND OBJECTIVE BOX
Patient was seen and examined  Patient is a 65y old  Female who presents with a chief complaint of Hypotension (10 Nov 2020 10:58)      INTERVAL HPI/OVERNIGHT EVENTS:  T(C): 35.8 (11-10-20 @ 08:00), Max: 36.2 (11-10-20 @ 00:00)  HR: 82 (11-10-20 @ 11:00) (74 - 94)  BP: 104/60 (11-10-20 @ 11:00) (92/65 - 121/65)  RR: 34 (11-10-20 @ 11:00) (25 - 38)  SpO2: 98% (11-10-20 @ 11:00) (94% - 100%)  Wt(kg): --  I&O's Summary    09 Nov 2020 07:01  -  10 Nov 2020 07:00  --------------------------------------------------------  IN: 1198.4 mL / OUT: 2625 mL / NET: -1426.6 mL    10 Nov 2020 07:01  -  10 Nov 2020 12:21  --------------------------------------------------------  IN: 240 mL / OUT: 450 mL / NET: -210 mL        LABS:                        7.9    8.40  )-----------( 126      ( 10 Nov 2020 05:42 )             23.1     11-10    140  |  103  |  31<H>  ----------------------------<  100<H>  3.6   |  26  |  1.85<H>    Ca    8.1<L>      10 Nov 2020 05:42  Phos  2.9     11-10  Mg     1.8     11-10    TPro  5.1<L>  /  Alb  2.4<L>  /  TBili  5.5<H>  /  DBili  x   /  AST  151<H>  /  ALT  70<H>  /  AlkPhos  87  11-10    PT/INR - ( 10 Nov 2020 05:42 )   PT: 13.5 sec;   INR: 1.14 ratio         PTT - ( 10 Nov 2020 05:42 )  PTT:39.8 sec    CAPILLARY BLOOD GLUCOSE      POCT Blood Glucose.: 99 mg/dL (10 Nov 2020 10:50)  POCT Blood Glucose.: 88 mg/dL (09 Nov 2020 17:52)        ABG - ( 09 Nov 2020 12:03 )  pH, Arterial: 7.45  pH, Blood: x     /  pCO2: 37    /  pO2: 104   / HCO3: 26    / Base Excess: 2.0   /  SaO2: 96                    MEDICATIONS  (STANDING):  chlorhexidine 2% Cloths 1 Application(s) Topical daily  furosemide   Injectable 80 milliGRAM(s) IV Push every 12 hours  heparin   Injectable 5000 Unit(s) SubCutaneous every 12 hours  lactulose Syrup 10 Gram(s) Oral daily  levoFLOXacin IVPB      levoFLOXacin IVPB 750 milliGRAM(s) IV Intermittent every 48 hours  midodrine. 10 milliGRAM(s) Oral three times a day  nystatin Powder 1 Application(s) Topical two times a day  pantoprazole  Injectable 40 milliGRAM(s) IV Push two times a day  rifAXIMin 550 milliGRAM(s) Oral two times a day  vancomycin  IVPB 500 milliGRAM(s) IV Intermittent every 24 hours    MEDICATIONS  (PRN):  sodium chloride 0.9% lock flush 10 milliLiter(s) IV Push every 1 hour PRN Pre/post blood products, medications, blood draw, and to maintain line patency      RADIOLOGY & ADDITIONAL TESTS:    Imaging Personally Reviewed:  [ ] YES  [ ] NO    REVIEW OF SYSTEMS:  unable       Consultant(s) Notes Reviewed:  [ ] YES  [ ] NO    PHYSICAL EXAM:  GENERAL: NAD, well-groomed, well-developed  HEAD:  Atraumatic, Normocephalic  EYES: EOMI, PERRLA, conjunctiva and sclera clear  ENMT: No tonsillar erythema, exudates, or enlargement; Moist mucous membranes, Good dentition, No lesions  NECK: Supple, No JVD, Normal thyroid  NERVOUS SYSTEM:  on vent   CHEST/LUNG: Clear to percussion bilaterally; No rales, rhonchi, wheezing, or rubs  HEART: Regular rate and rhythm; No murmurs, rubs, or gallops  ABDOMEN: Soft, Nontender, Nondistended; Bowel sounds present  EXTREMITIES:  2+ Peripheral Pulses, No clubbing, cyanosis, or edema  LYMPH: No lymphadenopathy noted  SKIN: No rashes or lesions    Care Discussed with Consultants/Other Providers [ x] YES  [ ] NO

## 2020-11-10 NOTE — PROGRESS NOTE ADULT - SUBJECTIVE AND OBJECTIVE BOX
INTERVAL HPI/OVERNIGHT EVENTS: ***    PRESSORS: [ ] YES [ ] NO  WHICH:    ANTIBIOTICS:                  DATE STARTED:  ANTIBIOTICS:                  DATE STARTED:  ANTIBIOTICS:                  DATE STARTED:    Antimicrobial:  levoFLOXacin IVPB      levoFLOXacin IVPB 750 milliGRAM(s) IV Intermittent every 48 hours  rifAXIMin 550 milliGRAM(s) Oral two times a day  vancomycin  IVPB 500 milliGRAM(s) IV Intermittent every 24 hours    Cardiovascular:  furosemide   Injectable 80 milliGRAM(s) IV Push every 12 hours  midodrine. 10 milliGRAM(s) Oral three times a day    Pulmonary:    Hematalogic:  heparin   Injectable 5000 Unit(s) SubCutaneous every 12 hours    Other:  chlorhexidine 2% Cloths 1 Application(s) Topical daily  lactulose Syrup 10 Gram(s) Oral daily  nystatin Powder 1 Application(s) Topical two times a day  pantoprazole  Injectable 40 milliGRAM(s) IV Push two times a day  sodium chloride 0.9% lock flush 10 milliLiter(s) IV Push every 1 hour PRN    chlorhexidine 2% Cloths 1 Application(s) Topical daily  furosemide   Injectable 80 milliGRAM(s) IV Push every 12 hours  heparin   Injectable 5000 Unit(s) SubCutaneous every 12 hours  lactulose Syrup 10 Gram(s) Oral daily  levoFLOXacin IVPB      levoFLOXacin IVPB 750 milliGRAM(s) IV Intermittent every 48 hours  midodrine. 10 milliGRAM(s) Oral three times a day  nystatin Powder 1 Application(s) Topical two times a day  pantoprazole  Injectable 40 milliGRAM(s) IV Push two times a day  rifAXIMin 550 milliGRAM(s) Oral two times a day  sodium chloride 0.9% lock flush 10 milliLiter(s) IV Push every 1 hour PRN  vancomycin  IVPB 500 milliGRAM(s) IV Intermittent every 24 hours    Drug Dosing Weight  Height (cm): 167.6 (21 Oct 2020 02:26)  Weight (kg): 56.2 (21 Oct 2020 02:26)  BMI (kg/m2): 20 (21 Oct 2020 02:26)  BSA (m2): 1.63 (21 Oct 2020 02:26)    CENTRAL LINE: [ ] YES [ ] NO  LOCATION:         TREJO: [ ] YES [ ] NO          A-LINE:  [ ] YES [ ] NO  LOCATION:             ICU Vital Signs Last 24 Hrs  T(C): 35.8 (10 Nov 2020 08:00), Max: 36.2 (10 Nov 2020 00:00)  T(F): 96.5 (10 Nov 2020 08:00), Max: 97.1 (10 Nov 2020 00:00)  HR: 84 (10 Nov 2020 10:00) (74 - 94)  BP: 99/55 (10 Nov 2020 10:00) (78/54 - 121/65)  BP(mean): 67 (10 Nov 2020 10:00) (60 - 85)  ABP: --  ABP(mean): --  RR: 34 (10 Nov 2020 10:00) (20 - 38)  SpO2: 98% (10 Nov 2020 10:00) (94% - 100%)      ABG - ( 09 Nov 2020 12:03 )  pH, Arterial: 7.45  pH, Blood: x     /  pCO2: 37    /  pO2: 104   / HCO3: 26    / Base Excess: 2.0   /  SaO2: 96                    11-09 @ 07:01  -  11-10 @ 07:00  --------------------------------------------------------  IN: 1198.4 mL / OUT: 2625 mL / NET: -1426.6 mL        Mode: standby      REVIEW OF SYSTEMS:    CONSTITUTIONAL: No weakness, fevers or chills  NECK: No pain or stiffness  RESPIRATORY: No cough, wheezing, hemoptysis; No shortness of breath  CARDIOVASCULAR: No chest pain or palpitations  GASTROINTESTINAL: No abdominal or epigastric pain. No nausea, vomiting, No diarrhea or constipation. No melena or hematochezia.  GENITOURINARY: No dysuria, frequency or hematuria  NEUROLOGICAL: No numbness or weakness  All other review of systems is negative unless indicated above      PHYSICAL EXAM:    GENERAL: NAD, well-groomed, well-developed  EYES: EOMI, PERRLA,   NECK: Supple, No JVD; Normal thyroid; Trachea midline  NERVOUS SYSTEM:  Alert & Oriented X3,  Motor Strength 5/5 B/L upper and lower extremities; DTRs 2+ intact and symmetric  CHEST/LUNG: No rales, rhonchi, wheezing   HEART: Regular rate and rhythm; No murmurs,   ABDOMEN: Soft, Nontender, Nondistended; Bowel sounds present  EXTREMITIES:  2+ Peripheral Pulses, No clubbing, cyanosis, or edema        LABS:  CBC Full  -  ( 10 Nov 2020 05:42 )  WBC Count : 8.40 K/uL  RBC Count : 2.61 M/uL  Hemoglobin : 7.9 g/dL  Hematocrit : 23.1 %  Platelet Count - Automated : 126 K/uL  Mean Cell Volume : 88.5 fl  Mean Cell Hemoglobin : 30.3 pg  Mean Cell Hemoglobin Concentration : 34.2 gm/dL  Auto Neutrophil # : 6.47 K/uL  Auto Lymphocyte # : 0.69 K/uL  Auto Monocyte # : 0.88 K/uL  Auto Eosinophil # : 0.22 K/uL  Auto Basophil # : 0.07 K/uL  Auto Neutrophil % : 77.1 %  Auto Lymphocyte % : 8.2 %  Auto Monocyte % : 10.5 %  Auto Eosinophil % : 2.6 %  Auto Basophil % : 0.8 %    11-10    140  |  103  |  31<H>  ----------------------------<  100<H>  3.6   |  26  |  1.85<H>    Ca    8.1<L>      10 Nov 2020 05:42  Phos  2.9     11-10  Mg     1.8     11-10    TPro  5.1<L>  /  Alb  2.4<L>  /  TBili  5.5<H>  /  DBili  x   /  AST  151<H>  /  ALT  70<H>  /  AlkPhos  87  11-10    PT/INR - ( 10 Nov 2020 05:42 )   PT: 13.5 sec;   INR: 1.14 ratio         PTT - ( 10 Nov 2020 05:42 )  PTT:39.8 sec        RADIOLOGY & ADDITIONAL STUDIES REVIEWED:  ***    [ ]GOALS OF CARE DISCUSSION WITH PATIENT/FAMILY/PROXY:    CRITICAL CARE TIME SPENT: 35 minutes INTERVAL HPI/OVERNIGHT EVENTS:   pt extubated , no acute event over night    lactulose was held due to frequent BM over night         PRESSORS: [ ] YES [ ] NO  WHICH:    ANTIBIOTICS:                  DATE STARTED:  ANTIBIOTICS:                  DATE STARTED:  ANTIBIOTICS:                  DATE STARTED:    Antimicrobial:  levoFLOXacin IVPB      levoFLOXacin IVPB 750 milliGRAM(s) IV Intermittent every 48 hours  rifAXIMin 550 milliGRAM(s) Oral two times a day  vancomycin  IVPB 500 milliGRAM(s) IV Intermittent every 24 hours    Cardiovascular:  furosemide   Injectable 80 milliGRAM(s) IV Push every 12 hours  midodrine. 10 milliGRAM(s) Oral three times a day    Pulmonary:    Hematalogic:  heparin   Injectable 5000 Unit(s) SubCutaneous every 12 hours    Other:  chlorhexidine 2% Cloths 1 Application(s) Topical daily  lactulose Syrup 10 Gram(s) Oral daily  nystatin Powder 1 Application(s) Topical two times a day  pantoprazole  Injectable 40 milliGRAM(s) IV Push two times a day  sodium chloride 0.9% lock flush 10 milliLiter(s) IV Push every 1 hour PRN    chlorhexidine 2% Cloths 1 Application(s) Topical daily  furosemide   Injectable 80 milliGRAM(s) IV Push every 12 hours  heparin   Injectable 5000 Unit(s) SubCutaneous every 12 hours  lactulose Syrup 10 Gram(s) Oral daily  levoFLOXacin IVPB      levoFLOXacin IVPB 750 milliGRAM(s) IV Intermittent every 48 hours  midodrine. 10 milliGRAM(s) Oral three times a day  nystatin Powder 1 Application(s) Topical two times a day  pantoprazole  Injectable 40 milliGRAM(s) IV Push two times a day  rifAXIMin 550 milliGRAM(s) Oral two times a day  sodium chloride 0.9% lock flush 10 milliLiter(s) IV Push every 1 hour PRN  vancomycin  IVPB 500 milliGRAM(s) IV Intermittent every 24 hours    Drug Dosing Weight  Height (cm): 167.6 (21 Oct 2020 02:26)  Weight (kg): 56.2 (21 Oct 2020 02:26)  BMI (kg/m2): 20 (21 Oct 2020 02:26)  BSA (m2): 1.63 (21 Oct 2020 02:26)    CENTRAL LINE: [ ] YES [ ] NO  LOCATION:         TREJO: [ ] YES [ ] NO          A-LINE:  [ ] YES [ ] NO  LOCATION:             ICU Vital Signs Last 24 Hrs  T(C): 35.8 (10 Nov 2020 08:00), Max: 36.2 (10 Nov 2020 00:00)  T(F): 96.5 (10 Nov 2020 08:00), Max: 97.1 (10 Nov 2020 00:00)  HR: 84 (10 Nov 2020 10:00) (74 - 94)  BP: 99/55 (10 Nov 2020 10:00) (78/54 - 121/65)  BP(mean): 67 (10 Nov 2020 10:00) (60 - 85)  ABP: --  ABP(mean): --  RR: 34 (10 Nov 2020 10:00) (20 - 38)  SpO2: 98% (10 Nov 2020 10:00) (94% - 100%)      ABG - ( 09 Nov 2020 12:03 )  pH, Arterial: 7.45  pH, Blood: x     /  pCO2: 37    /  pO2: 104   / HCO3: 26    / Base Excess: 2.0   /  SaO2: 96                    11-09 @ 07:01  -  11-10 @ 07:00  --------------------------------------------------------  IN: 1198.4 mL / OUT: 2625 mL / NET: -1426.6 mL        Mode: standby      PHYSICAL EXAM:    GENERAL: Intubated + ETT  HEAD:  Atraumatic, Normocephalic  EYES: b/l pupil reactive to light.   NECK: Supple, normal appearance, No JVD; Normal thyroid; Trachea midline  NERVOUS SYSTEM:  follows commands.  CHEST/LUNG:  and rhonchi on auscultation  HEART: Regular rate and rhythm; No murmurs, rubs, or gallops  ABDOMEN: Soft, Nontender, mildly distended; Bowel sounds present  EXTREMITIES:  2+ Peripheral Pulses, No clubbing, cyanosis, or edema  LYMPH: No lymphadenopathy noted  SKIN: No rashes or lesions; Good capillary refill        LABS:  CBC Full  -  ( 10 Nov 2020 05:42 )  WBC Count : 8.40 K/uL  RBC Count : 2.61 M/uL  Hemoglobin : 7.9 g/dL  Hematocrit : 23.1 %  Platelet Count - Automated : 126 K/uL  Mean Cell Volume : 88.5 fl  Mean Cell Hemoglobin : 30.3 pg  Mean Cell Hemoglobin Concentration : 34.2 gm/dL  Auto Neutrophil # : 6.47 K/uL  Auto Lymphocyte # : 0.69 K/uL  Auto Monocyte # : 0.88 K/uL  Auto Eosinophil # : 0.22 K/uL  Auto Basophil # : 0.07 K/uL  Auto Neutrophil % : 77.1 %  Auto Lymphocyte % : 8.2 %  Auto Monocyte % : 10.5 %  Auto Eosinophil % : 2.6 %  Auto Basophil % : 0.8 %    11-10    140  |  103  |  31<H>  ----------------------------<  100<H>  3.6   |  26  |  1.85<H>    Ca    8.1<L>      10 Nov 2020 05:42  Phos  2.9     11-10  Mg     1.8     11-10    TPro  5.1<L>  /  Alb  2.4<L>  /  TBili  5.5<H>  /  DBili  x   /  AST  151<H>  /  ALT  70<H>  /  AlkPhos  87  11-10    PT/INR - ( 10 Nov 2020 05:42 )   PT: 13.5 sec;   INR: 1.14 ratio         PTT - ( 10 Nov 2020 05:42 )  PTT:39.8 sec        RADIOLOGY & ADDITIONAL STUDIES REVIEWED:  ***    [ ]GOALS OF CARE DISCUSSION WITH PATIENT/FAMILY/PROXY:    CRITICAL CARE TIME SPENT: 35 minutes

## 2020-11-10 NOTE — CHART NOTE - NSCHARTNOTEFT_GEN_A_CORE
Assessment:   Patient is a 65y old  Female who presents with a chief complaint of Hypotension (10 Nov 2020 13:50). Pt s/p extubation from vent . Pt seen, earlier today. TF infusing @ 40 ml/hr (MD order/ goal). Pt being followed by Palliative care.  Pt noted for SLP.    Factors impacting intake: [ ] none [ ] nausea  [ ] vomiting [ ] diarrhea [ ] constipation  [ ]chewing problems [ ] swallowing issues  [x ] other: critical illness with MOF, very poor prognosis noted.    Diet Prescription: Diet, NPO with Tube Feed:   Tube Feeding Modality: Nasogastric  Nepro with Carb Steady  Total Volume for 24 Hours (mL): 960  Continuous  Starting Tube Feed Rate {mL per Hour}: 10  Increase Tube Feed Rate by (mL): 10     Every 4 hours  Until Goal Tube Feed Rate (mL per Hour): 40  Tube Feed Duration (in Hours): 24  Tube Feed Start Time: 12:00 (20 @ 10:38)        Daily     Daily Weight in k.3 (10 Nov 2020 08:00)  Weight in k (2020 07:30)  Weight in k.7 (2020 07:15)  Weight in k.1 (2020 09:00)  Weight in k.8 (2020 07:00)  Weight in k.5 (2020 07:00)  Weight in k.3 (2020 07:00)  Weight in k.1 (2020 07:00)  Weight in k.6 (30 Oct 2020 07:30)  Weight in k.8 (29 Oct 2020 07:15)  Weight in k.7 (28 Oct 2020 07:30)    % Weight Change: 3+ generalized edema, I>O    Pertinent Medications: MEDICATIONS  (STANDING):  chlorhexidine 2% Cloths 1 Application(s) Topical daily  furosemide   Injectable 80 milliGRAM(s) IV Push every 12 hours  heparin   Injectable 5000 Unit(s) SubCutaneous every 12 hours  lactulose Syrup 10 Gram(s) Oral daily  levoFLOXacin IVPB      levoFLOXacin IVPB 750 milliGRAM(s) IV Intermittent every 48 hours  midodrine. 10 milliGRAM(s) Oral three times a day  nystatin Powder 1 Application(s) Topical two times a day  pantoprazole  Injectable 40 milliGRAM(s) IV Push two times a day  rifAXIMin 550 milliGRAM(s) Oral two times a day  vancomycin  IVPB 500 milliGRAM(s) IV Intermittent every 24 hours    MEDICATIONS  (PRN):  sodium chloride 0.9% lock flush 10 milliLiter(s) IV Push every 1 hour PRN Pre/post blood products, medications, blood draw, and to maintain line patency    Pertinent Labs: 11-10 Na140 mmol/L Glu 100 mg/dL<H> K+ 3.6 mmol/L Cr  1.85 mg/dL<H> BUN 31 mg/dL<H> 11-10 Phos 2.9 mg/dL 11-10 Alb 2.4 g/dL<L> 10-31 Chol --    LDL --    HDL --    Trig 87 mg/dL     CAPILLARY BLOOD GLUCOSE      POCT Blood Glucose.: 99 mg/dL (10 Nov 2020 10:50)  POCT Blood Glucose.: 88 mg/dL (2020 17:52)        Previous Nutrition Diagnosis:   [ ] Altered GI function  [ ]Inadequate Oral Intake [ ] Swallowing Difficulty   [ ] Altered nutrition related labs [ ] Increased Nutrient Needs [ ] Overweight/Obesity   [ ] Unintended Weight Loss [ ] Food & Nutrition Related Knowledge Deficit [x ] Malnutrition (severe PCM)  [ ] Other:     Nutrition Diagnosis is [x ] ongoing  [ ] resolved [ ] not applicable     New Nutrition Diagnosis: [ ] not applicable       Interventions:   Recommend  [ ] Change Diet To:  [ ] Nutrition Supplement  [x ] Nutrition Support: See note  re: TF goal (may reduce to 30 ml/hr). Tf as medically feasible and if consistent with goals of care. MD to monitor. RD available.   [x ] Other: SLP before PO.     Monitoring and Evaluation:   [ x ] follow up per protocol  [ ] other:

## 2020-11-10 NOTE — PROGRESS NOTE ADULT - ASSESSMENT
65y Female from home, lives with daughter, ambulates with walker with PMH of recurrent SBO's, s/p exp lap, SB resection in 2015, ex lap, ALBINA in 2018, DVT, PE, on Xarelto, IVC filter, chronic leg swelling, and anasarca, came in to the ED due to hypotension during office visit. Patient was found to be bacteremic with bacteroids fragilis. Admitted in ICU due to hypotension and hypothermia. On vancomycin and levo . BCx X2 negative. Sputum positive for MRSA and Stenotrophomonas.     Diagnosis:  >Encephalopathy  >Sepsis  >Bacteremia  >Anemia  >DVT/PE  >Transaminitis  >Aspiration pneumonia    --------------------------------------------Neuro--------------------------------------  -She is AAOx2 at baseline on evaluation  -Encephalopathic and got intubated on 10/24, extubated on 11/9  -Ammonia trending down from 152 > 130 > 116 >103>131>126>90>77----> less than 10  -On lactulose 30g daily and rifaximin, goal BM 2-3   -INR>1.63>1.71>1.91>1.98> 1.44  -Pt with multiorgan failure secondary to septic shock  -Hold CT head for now as AMS was most likely due to hepatic encephalopathy and pt didn't have any focal neurological deficit when she started having AMS  -pupils are reactive to light b/l, patient can follow commands and communicates by responding, does not talk still  -  patint off precedex  -Off to Precedex , RR 20      -------------------------------------CVS-------------------------------  -Patient was hypotensive secondary septic shoc  started on levophed currently can maintain BP , hence off pressors   -On abx Levaquin renally dosed , Vancomycin 500mg q24hrs dosing with trough   -NG tube feeding  -Midodrine 10 mg TID  -RIJ for pressors   -Lasix 80mg BID for improving UO  -previous ECHO 3-4 months back showed normal EF but new ECHO showed EF 15-20% with severe left ventricular dysfunction  -pt with multiorgan dysfunction sec to septic shock , mixed venous gas from RIJ showed borderline low O2 saturation , mixed picture of septic /cardiogenic shock   - anticardiolipin antibodies IgG IgM , ANCA  -Anticardiolipin abs +ve      --------------------------------Respiratory----------------------  - Passed SBT and extubated on 11/9  -Intubated and sedated on levophed for hypotesnion  - CXR: small bilateral pleural effusion and atelectasia  -CT showed mild b/l pleural effusion and left lower lobe consolidation in ICU on day 2  -aspiration pneumonia  - sputum culture grew MRSA and Stenotrophomonas (sensitive to levo)  - will c/w levaquin 500 q24 hours ,   - will c/w vancomycin 500 a24hrs dosed with vanc though   --repeat sputum culture neg  -ID Dr. Patricio    ----------------------------------------Gastrointestinal--------------------------------------  -Patient was bacteremic with bacteroids fragilis, likely secondary from intra abdominal source.   -Patient had multiple SBO in past. 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 Likely due to sepsis vs hepatic failure sec to budd Chiari  -BCx X 2 negative.  -ammonia trends down from 150 to 130 >116>103>131>126>90> 77,--<10 on lactulose and rifaximin   -monitor BM, titrate lactulose top 2-3 BM qd   -GI, DR Chatterjee deferred doing colonoscopy due to multiple comorbidities, FOBT positive on 10/15  -Dr Katlyn aguilar has been noted,    -----------------------------------------ID---------------------------------------  -Patient was bacteremic with bacteroids likely GI source.  -Lactate is normal 1.3 but trends up to 2.3 >3.7  -Procalcitonin 0.79  -On Levaquin and held vanc due to high trough ,   - BP on the lower side, pt is on Levophed baby dose and midodrine 10mg TID  -Monitor vitals Q4  - Given 1 dose Vancomycin 500mg 11/6 PM  - F/U Vanc trough 10PM    ---------------------------------------------Nephro-------------------------------------  - hypernatremia improved   - on free water 120 ml q6h  -TF  - Creatinine trended down slightly today , will avoid nephrotoxin , patient in MOF   -Monitor UO,   - on Lasix 80 BID   - UO and anasarca improved    ----------------------------------------Hem Onc----------------------------------  -Has h/o DVt and PE in past. Patient is on Xarelto at home  -Patient has anemia  -anemia of chronic disease  -Hgb 6.8>1PRBC>8.2> 7.9>7.5>7.4>7.6  -plt trended up to 96, no active bleeding  - will start on prophylactic dose Ac Heparin 5000 q12 hrs per dr rivera recommendation   -Folate and B12 normal.   -Dr. Rivera recs following, s/p thiamine 3 doses  - will check INR daily     ----------------------------------------Endo--------------------------------  -HgbA1c 4  -Fs q6h    ----------------------------------------Prophylaxis----------------------------  DVT: heparin sq 5000 q12 hrs  GI: PPI    ---------------------------------------GOC---------------------------  -DNR   -Pt with multi organ failure secondary to septic shock with poor prognosis  -Palliative is on board  -family discussion next week         65y Female from home, lives with daughter, ambulates with walker with PMH of recurrent SBO's, s/p exp lap, SB resection in 2015, ex lap, ALBINA in 2018, DVT, PE, on Xarelto, IVC filter, chronic leg swelling, and anasarca, came in to the ED due to hypotension during office visit. Patient was found to be bacteremic with bacteroids fragilis. Admitted in ICU due to hypotension and hypothermia. On vancomycin and levo . BCx X2 negative. Sputum positive for MRSA and Stenotrophomonas.     Diagnosis:  >Encephalopathy  >Sepsis  >Bacteremia  >Anemia  >DVT/PE  >Transaminitis  >Aspiration pneumonia    --------------------------------------------Neuro--------------------------------------  -She is AAOx2 at baseline on evaluation, pt awake , more alert , started to communicate   -Encephalopathic and got intubated on 10/24, extubated on 11/9  -Ammonia trending down from 152 > 130 > 116 >103>131>126>90>77----> less than 10  -On lactulose 30g daily and rifaximin, goal BM 2-3   -INR>1.63>1.71>1.91>1.98> 1.44  -Pt with multiorgan failure secondary to septic shock  -Hold CT head for now as AMS was most likely due to hepatic encephalopathy and pt didn't have any focal neurological deficit when she started having AMS  -pupils are reactive to light b/l, patient can follow commands and communicates by responding, started to talk   -  patint off precedex  -Off to Precedex , RR 20      -------------------------------------CVS-------------------------------  -Patient was hypotensive secondary septic shoc  started on levophed currently can maintain BP , hence off pressors   -On abx Levaquin renally dosed , Vancomycin 500mg q24hrs dosing with trough   -NG tube feeding  -Midodrine 10 mg TID  -RIJ for pressors   -Lasix 80mg BID for improving UO  -previous ECHO 3-4 months back showed normal EF but new ECHO showed EF 15-20% with severe left ventricular dysfunction  -pt with multiorgan dysfunction sec to septic shock , mixed venous gas from RIJ showed borderline low O2 saturation , mixed picture of septic /cardiogenic shock   - anticardiolipin antibodies IgG IgM , ANCA  -Anticardiolipin abs +ve      --------------------------------Respiratory----------------------  - Passed SBT and extubated on 11/9  -Intubated and sedated on levophed for hypotesnion  - CXR: small bilateral pleural effusion and atelectasia  -CT showed mild b/l pleural effusion and left lower lobe consolidation in ICU on day 2  -aspiration pneumonia  - sputum culture grew MRSA and Stenotrophomonas (sensitive to levo)  - will c/w levaquin 500 q24 hours ,   - will c/w vancomycin 500 a24hrs dosed with vanc though   --repeat sputum culture neg  - last dose of antibiotics 11/10  -ID Dr. Patricio    ----------------------------------------Gastrointestinal--------------------------------------  -Patient was bacteremic with bacteroids fragilis, likely secondary from intra abdominal source.   -Patient had multiple SBO in past. 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 Likely due to sepsis vs hepatic failure sec to budd Chiari  -BCx X 2 negative.  -ammonia trends down from 150 to 130 >116>103>131>126>90> 77,--<10 on lactulose and rifaximin   -monitor BM, titrate lactulose top 2-3 BM qd   -GI, DR Chatterjee deferred doing colonoscopy due to multiple comorbidities, FOBT positive on 10/15  -Dr Katlyn aguilar has been noted,    -----------------------------------------ID---------------------------------------  -Patient was bacteremic with bacteroids likely GI source.  -Lactate is normal 1.3 but trends up to 2.3 >3.7  -Procalcitonin 0.79  -On Levaquin and held vanc due to high trough ,   - BP on the lower side, pt is on Levophed baby dose and midodrine 10mg TID  -Monitor vitals Q4  - Given 1 dose Vancomycin 500mg 11/6 PM  - F/U Vanc trough 10PM    ---------------------------------------------Nephro-------------------------------------  - hypernatremia improved   - on free water 120 ml q6h  -TF  - Creatinine trended down slightly today , will avoid nephrotoxin , patient in MOF   -Monitor UO,   - on Lasix 80 BID   - UO and anasarca improved    ----------------------------------------Hem Onc----------------------------------  -Has h/o DVt and PE in past. Patient is on Xarelto at home  -Patient has anemia  -anemia of chronic disease  -Hgb 6.8>1PRBC>8.2> 7.9>7.5>7.4>7.6  -plt trended up to 96, no active bleeding  - will start on prophylactic dose Ac Heparin 5000 q12 hrs per dr rivera recommendation   -Folate and B12 normal.   -Dr. Rivera recs following, s/p thiamine 3 doses  - will check INR daily     ----------------------------------------Endo--------------------------------  -HgbA1c 4  -Fs q6h    ----------------------------------------Prophylaxis----------------------------  DVT: heparin sq 5000 q12 hrs  GI: PPI    ---------------------------------------GOC---------------------------  -DNR   -Pt with multi organ failure secondary to septic shock with poor prognosis  -Palliative is on board  -family discussion next week

## 2020-11-11 NOTE — PHYSICAL THERAPY INITIAL EVALUATION ADULT - DIAGNOSIS, PT EVAL
Patient presents w/ limited mobility and function due to weakness and sacral ulcer Patient presents w/ limited mobility and function due to weakness and decreased endurance

## 2020-11-11 NOTE — PROGRESS NOTE ADULT - ASSESSMENT
65y Female from home, lives with daughter, ambulates with walker with PMH of recurrent SBO's, s/p exp lap, SB resection in 2015, ex lap, ALBINA in 2018, DVT, PE, on Xarelto, IVC filter, chronic leg swelling, and anasarca, came in to the ED due to hypotension during office visit. Patient was found to be bacteremic with bacteroids fragilis. Admitted in ICU due to hypotension and hypothermia. On vancomycin and levo . BCx X2 negative. Sputum positive for MRSA and Stenotrophomonas.     Diagnosis:  >Encephalopathy  >Sepsis  >Bacteremia  >Anemia  >DVT/PE  >Transaminitis  >Aspiration pneumonia    --------------------------------------------Neuro--------------------------------------  -She is AAOx2 at baseline on evaluation, pt awake , more alert , started to communicate   -Encephalopathic and got intubated on 10/24, extubated on 11/9  -Ammonia trending down from 152 > 130 > 116 >103>131>126>90>77----> less than 10  -On lactulose 30g daily and rifaximin, goal BM 2-3   -INR>1.63>1.71>1.91>1.98> 1.44  -Pt with multiorgan failure secondary to septic shock  -Hold CT head for now as AMS was most likely due to hepatic encephalopathy and pt didn't have any focal neurological deficit when she started having AMS  -pupils are reactive to light b/l, patient can follow commands and communicates by responding, started to talk   -  patint off precedex  -Off to Precedex , RR 20      -------------------------------------CVS-------------------------------  -Patient was hypotensive secondary septic shoc  started on levophed currently can maintain BP , hence off pressors   -On abx Levaquin renally dosed , Vancomycin 500mg q24hrs dosing with trough   -NG tube feeding  -Midodrine 10 mg TID  -RIJ for pressors   -Lasix 80mg BID for improving UO  -previous ECHO 3-4 months back showed normal EF but new ECHO showed EF 15-20% with severe left ventricular dysfunction  -pt with multiorgan dysfunction sec to septic shock , mixed venous gas from RIJ showed borderline low O2 saturation , mixed picture of septic /cardiogenic shock   - anticardiolipin antibodies IgG IgM , ANCA  -Anticardiolipin abs +ve      --------------------------------Respiratory----------------------  - Passed SBT and extubated on 11/9  -Intubated and sedated on levophed for hypotesnion  - CXR: small bilateral pleural effusion and atelectasia  -CT showed mild b/l pleural effusion and left lower lobe consolidation in ICU on day 2  -aspiration pneumonia  - sputum culture grew MRSA and Stenotrophomonas (sensitive to levo)  - will c/w levaquin 500 q24 hours ,   - will c/w vancomycin 500 a24hrs dosed with vanc though   --repeat sputum culture neg  - last dose of antibiotics 11/10  -ID Dr. Patricio    ----------------------------------------Gastrointestinal--------------------------------------  -Patient was bacteremic with bacteroids fragilis, likely secondary from intra abdominal source.   -Patient had multiple SBO in past. 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 Likely due to sepsis vs hepatic failure sec to budd Chiari  -BCx X 2 negative.  -ammonia trends down from 150 to 130 >116>103>131>126>90> 77,--<10 on lactulose and rifaximin   -monitor BM, titrate lactulose top 2-3 BM qd   -GI, DR Chatterjee deferred doing colonoscopy due to multiple comorbidities, FOBT positive on 10/15  -Dr Katlyn aguilar has been noted,    -----------------------------------------ID---------------------------------------  -Patient was bacteremic with bacteroids likely GI source.  -Lactate is normal 1.3 but trends up to 2.3 >3.7  -Procalcitonin 0.79  -On Levaquin and held vanc due to high trough ,   - BP on the lower side, pt is on Levophed baby dose and midodrine 10mg TID  -Monitor vitals Q4  - Given 1 dose Vancomycin 500mg 11/6 PM  - F/U Vanc trough 10PM    ---------------------------------------------Nephro-------------------------------------  - hypernatremia improved   - on free water 120 ml q6h  -TF  - Creatinine trended down slightly today , will avoid nephrotoxin , patient in MOF   -Monitor UO,   - on Lasix 80 BID   - UO and anasarca improved    ----------------------------------------Hem Onc----------------------------------  -Has h/o DVt and PE in past. Patient is on Xarelto at home  -Patient has anemia  -anemia of chronic disease  -Hgb 6.8>1PRBC>8.2> 7.9>7.5>7.4>7.6  -plt trended up to 96, no active bleeding  - will start on prophylactic dose Ac Heparin 5000 q12 hrs per dr rivera recommendation   -Folate and B12 normal.   -Dr. Rivera recs following, s/p thiamine 3 doses  - will check INR daily     ----------------------------------------Endo--------------------------------  -HgbA1c 4  -Fs q6h    ----------------------------------------Prophylaxis----------------------------  DVT: heparin sq 5000 q12 hrs  GI: PPI    ---------------------------------------GOC---------------------------  -DNR   -Pt with multi organ failure secondary to septic shock with poor prognosis  -Palliative is on board  -family discussion next week         65y Female from home, lives with daughter, ambulates with walker with PMH of recurrent SBO's, s/p exp lap, SB resection in 2015, ex lap, ALBINA in 2018, DVT, PE, on Xarelto, IVC filter, chronic leg swelling, and anasarca, came in to the ED due to hypotension during office visit. Patient was found to be bacteremic with bacteroids fragilis. Admitted in ICU due to hypotension and hypothermia. On vancomycin and levo . BCx X2 negative. Sputum positive for MRSA and Stenotrophomonas.     Diagnosis:  >Encephalopathy  >Sepsis  >Bacteremia  >Anemia  >DVT/PE  >Transaminitis  >Aspiration pneumonia    --------------------------------------------Neuro--------------------------------------  -She is AAOx2 at baseline on evaluation, pt awake , more alert , started to communicate   -Encephalopathic and got intubated on 10/24, extubated on 11/9  -Ammonia trending down from 152 > 130 > 116 >103>131>126>90>77----> less than 10  -On lactulose 30g daily and rifaximin, goal BM 2-3 , Nurse held it for decubitus pressure ulcer  -INR>1.63>1.71>1.91>1.98> 1.44  -Pt went into MOF in setting of septic shock, Liver function and kidney function improved   -CTH held since AMS was most likely due to high ammonia level, hepatic encephalopathy and pt didn't have any focal neurological deficit when she started having AMS  - Patient off Precedex        -------------------------------------CVS-------------------------------  -Patient was hypotensive secondary to septic shock  versus cardiogenic shock, was on  levophed,  currently off vasopressor on Midodrine 10mg TID   -Midodrine 10 mg TID  -will remove RIJ after OR    -will c/w agreesive diuresis with Lasix 80mg BID for Ansarca .  UO and ascites improved   -previous ECHO 3-4 months back showed normal EF but new ECHO showed EF 15-20% with severe left ventricular dysfunction  -pt with multiorgan dysfunction sec to septic shock versus cardiogenic shock with improvement    - PANCa , C ANCa negative  -Anticardiolipin abs +ve      --------------------------------Respiratory----------------------  - Passed SBT and extubated on 11/9  - CXR: persistent  bilateral pleural effusion with underlying airspace consolidation   - on second day of ICU admission , CT showed mild b/l pleural effusion and left lower lobe consolidation likley 2/2 aspiration pneumonia  - sputum culture grew MRSA and Stenotrophomonas (sensitive to levo)  - Patient completed a course of vancomycin and Levaquin   --repeat sputum culture neg  - last dose of antibiotics 11/10  -ID Dr. Patricio    ----------------------------------------Gastrointestinal--------------------------------------  - NGT removed on 11/10  - NPO for Procedure   -Patient was bacteremic with bacteroids fragilis, likely secondary from intra abdominal source.   -Patient had multiple SBO in past. Ct abdomen on admission showed ileus, Repeat CT A/P showed ileus and non occlusive ischemic colitis  -No signs of acute abdomen   -Surgery recs > No intervention, pt was admitted in Jan diagnosed with budd Chiari syndrome with portal HTN recs to transfer to [facility] but pt is not stable to transfer  -Pt was in multi organ dysfunction secondary to septic shock, kidney and liver function improved  -repeat BCx X 2 negative.  -ammonia trends down from 150 to 130 >116>103>131>126>90> 77,--<10 on lactulose (with parameters 2-3 BM/day) and rifaximin   -Will monitor BM, titrate lactulose to 2-3 BM qd   -GI, DR Chatterjee deferred doing colonoscopy due to multiple comorbidities, FOBT positive on 10/15  -Dr Katlyn aguilar has been noted,    -----------------------------------------ID---------------------------------------  -Patient was bacteremic with bacteroids likely GI source.  -Lactate on admission 1.3 but trended up to 2.7 and later 3.7, was not trended since the reason was likley liver dysfunction   -Procalcitonin 0.79  - sputum culture grew MRSA and Stenotrophomonas (sensitive to levo)  - Patient completed a course of vancomycin and Levaquin   --repeat sputum culture neg  - last dose of antibiotics 11/10  -ID Dr. Patricio    ---------------------------------------------Nephro--------s-----------------------------  - hypernatremia improved   - Creatinine trended down  , will avoid nephrotoxin  -Monitor UO,   - on Lasix 80 BID   - UO and anasarca improved    ----------------------------------------Hem Onc----------------------------------  -Has h/o DVT and PE in past. Patient is on Xarelto at home  -Patient has anemia of chronic disease  -Hgb 6.8>1PRBC>8.2> 7.9>7.5>7.4>7.6> 7.3  -plt trended up to 122, no active bleeding  - will start on prophylactic dose Ac Heparin 5000 q12 hrs per dr sullivan recommendation   -Folate and B12 normal.   -Dr. Miguel aguilar following, s/p thiamine 3 doses  - INR improved to 1.09     ----------------------------------------Endo--------------------------------  -HgbA1c 4  -Fs q6h since patient is NPO for OR  - FS on lower side in the morning   - will start on IVF RL+D5 50cc/h while NPO     ----------------------------------------Prophylaxis----------------------------  DVT: heparin sq 5000 q12 hrs  GI: PPI    ---------------------------------------GOC---------------------------  -DNR   -Palliative is on board

## 2020-11-11 NOTE — PROGRESS NOTE ADULT - ASSESSMENT
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  BC grew bacteroides.    Problem/Recommendation - 1:  Problem: Bacteremia. Recommendation: bacteroides  most likely GI origin  on antibiotics  she had hypothermia and hypotension and elevated lactic acid  in ICU now, she is extubated today, bp is more stable  multiorgan failure but improving  christel is trending down now, cholestasis, etiology?  Cr continue to trend down slowly,  may be due to poor perfusion  Problem/Recommendation - 2:·  Problem: Anemia.  Recommendation: ferritin, b12 folate normalno hemolysis  most likley due to malnutrition from short bowel syndrome.she also had GIB multiple times before. Problem/Recommendation - 3:·  Problem: Acute deep vein thrombosis (DVT) of other vein of upper extremity.  Recommendation: she has been on xarelto for 2 years.  DVT at left arm and left leg before.  in all CT scan i did not see report of IVC or hepatic vein thrombosisoff xarelto and lovenox due to elevated PT/PTT. elevated PT/PTT due to malnutrition and antibiotics causing VITK def  now it is mostly recovered  it begins to rise again, correctable by mixing study  probably due to liver failure this time  she develops DVT very quickly while off ACneed DVT prophylaxis, sq heparin  5. thrombocytopenia  new onset, most likely from sepsis or medication, procal is rising  the other possibilties causing multiorgan failure, including thrombocytopenia,  such as autoimmune, vasculitis, HLH, catastrophic APS(less likely)  platelet is trending up, 150 today  6. CHF, new onset global hypokinesia,   cardiomyopathy, ?etiology  had 3 days of thiamin

## 2020-11-11 NOTE — PROGRESS NOTE ADULT - ASSESSMENT
as per icu  65y Female from home, lives with daughter, ambulates with walker with PMH of recurrent SBO's, s/p exp lap, SB resection in 2015, ex lap, ALBINA in 2018, DVT, PE, on Xarelto, IVC filter, chronic leg swelling, and anasarca, came in to the ED due to hypotension during office visit. Patient was found to be bacteremic with bacteroids fragilis. Admitted in ICU due to hypotension and hypothermia. On vancomycin and levo . BCx X2 negative. Sputum positive for MRSA and Stenotrophomonas.     Diagnosis:  >Encephalopathy  >Sepsis  >Bacteremia  >Anemia  >DVT/PE  >Transaminitis  >Aspiration pneumonia    --------------------------------------------Neuro--------------------------------------  -She is AAOx2 at baseline on evaluation  -Encephalopathic and got intubated on 10/24, extubated on 11/9  -Ammonia trending down from 152 > 130 > 116 >103>131>126>90>77----> less than 10  -On lactulose 30g daily and rifaximin, goal BM 2-3   -INR>1.63>1.71>1.91>1.98> 1.44  -Pt with multiorgan failure secondary to septic shock  -Hold CT head for now as AMS was most likely due to hepatic encephalopathy and pt didn't have any focal neurological deficit when she started having AMS  -pupils are reactive to light b/l, patient can follow commands and communicates by responding, does not talk still  -  patint off precedex  -Off to Precedex , RR 20      -------------------------------------CVS-------------------------------  -Patient was hypotensive secondary septic shoc  started on levophed currently can maintain BP , hence off pressors   -On abx Levaquin renally dosed , Vancomycin 500mg q24hrs dosing with trough   -NG tube feeding  -Midodrine 10 mg TID  -RIJ for pressors   -Lasix 80mg BID for improving UO  -previous ECHO 3-4 months back showed normal EF but new ECHO showed EF 15-20% with severe left ventricular dysfunction  -pt with multiorgan dysfunction sec to septic shock , mixed venous gas from RIJ showed borderline low O2 saturation , mixed picture of septic /cardiogenic shock   - anticardiolipin antibodies IgG IgM , ANCA  -Anticardiolipin abs +ve      --------------------------------Respiratory----------------------  - Passed SBT and extubated on 11/9  -Intubated and sedated on levophed for hypotesnion  - CXR: small bilateral pleural effusion and atelectasia  -CT showed mild b/l pleural effusion and left lower lobe consolidation in ICU on day 2  -aspiration pneumonia  - sputum culture grew MRSA and Stenotrophomonas (sensitive to levo)  - will c/w levaquin 500 q24 hours ,   - will c/w vancomycin 500 a24hrs dosed with vanc though   --repeat sputum culture neg  -ID Dr. Patricio    ----------------------------------------Gastrointestinal--------------------------------------  -Patient was bacteremic with bacteroids fragilis, likely secondary from intra abdominal source.   -Patient had multiple SBO in past. 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 Likely due to sepsis vs hepatic failure sec to budd Chiari  -BCx X 2 negative.  -ammonia trends down from 150 to 130 >116>103>131>126>90> 77,--<10 on lactulose and rifaximin   -monitor BM, titrate lactulose top 2-3 BM qd   -GI, DR Chatterjee deferred doing colonoscopy due to multiple comorbidities, FOBT positive on 10/15  -Dr Potts recs has been noted,    -----------------------------------------ID---------------------------------------  -Patient was bacteremic with bacteroids likely GI source.  -Lactate is normal 1.3 but trends up to 2.3 >3.7  -Procalcitonin 0.79  -On Levaquin and held vanc due to high trough ,   - BP on the lower side, pt is on Levophed baby dose and midodrine 10mg TID  -Monitor vitals Q4  - Given 1 dose Vancomycin 500mg 11/6 PM  - F/U Vanc trough 10PM    ---------------------------------------------Nephro-------------------------------------  - hypernatremia improved   - on free water 120 ml q6h  -TF  - Creatinine trended down slightly today , will avoid nephrotoxin , patient in MOF   -Monitor UO,   - on Lasix 80 BID   - UO and anasarca improved    ----------------------------------------Hem Onc----------------------------------  -Has h/o DVt and PE in past. Patient is on Xarelto at home  -Patient has anemia  -anemia of chronic disease  -Hgb 6.8>1PRBC>8.2> 7.9>7.5>7.4>7.6  -plt trended up to 96, no active bleeding  - will start on prophylactic dose Ac Heparin 5000 q12 hrs per dr rivera recommendation   -Folate and B12 normal.   -Dr. Rivera recs following, s/p thiamine 3 doses  - will check INR daily     ----------------------------------------Endo--------------------------------  -HgbA1c 4  -Fs q6h    ----------------------------------------Prophylaxis----------------------------  DVT: heparin sq 5000 q12 hrs  GI: PPI    ---------------------------------------GOC---------------------------  -DNR   -Pt with multi organ failure secondary to septic shock with poor prognosis  -Palliative is on board  -family discussion next week

## 2020-11-11 NOTE — BRIEF OPERATIVE NOTE - NSICDXBRIEFPROCEDURE_GEN_ALL_CORE_FT
PROCEDURES:  Incision and drainage, decubitus ulcer, skin, with debridement 15-Nov-2020 15:55:00  Rachel Burks

## 2020-11-11 NOTE — PROGRESS NOTE ADULT - ASSESSMENT
Hospital course:  66 yo Female with Hx of recurrent SBO`s s/p exp lap, small bowel resection in 2015, ex lap, ALBINA in 2018, DVT, PE (was on Xarelto), IVC filter and s/p supra-hepatic IVC thrombosis/occlusion, anemia, anasarca was sent to ED by PCP for hypotension, generalized weakness and chills and was admitted on 10/13/2020 for hypotension, r/o sepsis , found to have Bacteroides fragilis bacteremia, suspected GI source, was treated with cefepime + metronidazole, remained hypothermic, hypotensive, was transferred to ICU and switched to meropenem on 10/21. Repeat CT 10/21 suggested  non-specific enterocolitis, noninfectious inflammatory bowel disease or ischemic bowel along with ileus, and concern for SBO given mild luminal narrowing at distal small bowel. Patient remained hypotensive, hypothermic, requiring pressor support, septic and became encephalopathic and got intubated on 10/24, also diagnosed with HCAP vs. aspirational PNA, sputum growing MRSA and Stenotrophomonas maltophilia.  She was switched to levofloxacin on 10/27 and now on levo plus vancomycin. 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 Liver enzymes were /are up-trending, along with bilirubin, was suspected due to ongoing sepsis. Patient also noted with cardiomyopathy with EF 10-15%. Got extubated, without pressor.    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 (as per d/w primary team, it was non-conclusive)  - No caudate lobe hypertrophy reported (present in 75% of Budd Chiari cases), no macroregenerative nodules reported, characteristic pattern of parenchymal perfusion not described, but study reported limited due to motion artifact   - Venography would be gold standard for diagnosis, discussed earlier with IR, if patient will be more stable (possibly outpatient), can be evaluated at [facility] for Dx and potential intervention, however this time patient remains intubated , on pressor and now with EF 10-15 %, thus not transplant candidate and not candidate for TIPS  - was already on full dose anticoagulation, was being held b/o coagulopathy and now b/o severe thrombocytopenia, AC per primary team/hem (currently on DVT ppx)  - portal vein and IVC were patent on recent imaging  - Repeat US abd was done, but was limited and no comment either on hepatic vein again or on collaterals  - anticardiolipin antibody pos 1x (prior negative 1x)     # Abnormal liver enzymes with hyperbilirubinemia and coagulopathy, now MOF  # Ascites  - Elevated liver enzymes were thought partially due to ongoing sepsis (only mildly elevated or normal on admission and started to worsen when patient condition deteriorated, became hypotensive, hypothermic; peak  / now 159, peak  / now 72; peak ALP  316 / now 87; peak Se bi 9.2 / now 5.6 )   - Possibly the combination of sepsis /MOF and also due to severely reduced LVEF (10-15%) and RV dysfunction - improving as volume status improved, although still significantly elevated, thus cannot fully rule out additional etiology  - Dx paracentesis could be useful (last 2 sets of BCx nl) but as d/w IR, no safe window based on last CT and now small ascites on recent US abdomen - US was limited, check with IR if amenable for Dx paracentesis  - Prior ascites analysis from 2019: ascites albumin< 0.2, protein < 1; serum albumin 1.0; SAAG< 1.1 (early Budd Chiari would usually give SAAG > 1.1, total protein > 2.5, and in late Budd Chiari SAAG> 1.1 and total protein < 2.5), SAAG < 1.1 with low protein could be due to bowel obstruction/infarction, serositis among others  - prior liver workup during prior admissions: KATHY neg, AMA neg, HBsAg neg, HBcAb IgM neg, HAV IgM neg  - Given that still up-trending bilirubin, and coagulopathy, resent acute hep panel - negative, EBV - past infection, HSV IgG pos, CMV PCR neg, can send VZV PCR, hep E serology; KATHY neg, can consider rest of AI workup  - anticardiolipin IgM pos, beta 2 glycoprotein pos     # Encephalopathy, possibly multifactorial; Mental status overall improved, following commands  - c/w lactulose to titrate 2-3 BM/day (hold if diarrhea) and c/w rifaximin   - CT head was not done b/o lac-k of focal signs      Will continue to follow  d/w primary team  Thank you for the consult

## 2020-11-11 NOTE — BRIEF OPERATIVE NOTE - NSICDXBRIEFPREOP_GEN_ALL_CORE_FT
PRE-OP DIAGNOSIS:  Decubitus ulcer of sacral region, unstageable 15-Nov-2020 15:55:14  Rachel Burks N

## 2020-11-11 NOTE — PROGRESS NOTE ADULT - NUTRITIONAL ASSESSMENT
This patient has been assessed with a concern for Malnutrition and has been determined to have a diagnosis/diagnoses of Severe protein-calorie malnutrition.    This patient is being managed with:   Diet Dysphagia 1 Pureed-Nectar Consistency Fluid-  Entered: Nov 11 2020  6:39PM

## 2020-11-11 NOTE — PROGRESS NOTE ADULT - SUBJECTIVE AND OBJECTIVE BOX
Patient was seen and examined  Patient is a 65y old  Female who presents with a chief complaint of Hypotension (10 Nov 2020 17:53)      INTERVAL HPI/OVERNIGHT EVENTS:  T(C): 36.9 (11-11-20 @ 08:00), Max: 37.3 (11-10-20 @ 20:00)  HR: 79 (11-11-20 @ 09:00) (75 - 95)  BP: 101/60 (11-11-20 @ 09:00) (94/55 - 113/73)  RR: 38 (11-11-20 @ 09:00) (23 - 46)  SpO2: 98% (11-11-20 @ 09:00) (95% - 100%)  Wt(kg): --  I&O's Summary    10 Nov 2020 07:01  -  11 Nov 2020 07:00  --------------------------------------------------------  IN: 500 mL / OUT: 2630 mL / NET: -2130 mL    11 Nov 2020 07:01  -  11 Nov 2020 09:42  --------------------------------------------------------  IN: 100 mL / OUT: 270 mL / NET: -170 mL        LABS:                        7.3    8.62  )-----------( 122      ( 11 Nov 2020 06:01 )             21.5     11-11    140  |  105  |  31<H>  ----------------------------<  70  2.8<LL>   |  29  |  1.79<H>    Ca    8.3<L>      11 Nov 2020 06:01  Phos  2.8     11-11  Mg     1.6     11-11    TPro  5.3<L>  /  Alb  2.6<L>  /  TBili  5.6<H>  /  DBili  x   /  AST  159<H>  /  ALT  72<H>  /  AlkPhos  87  11-11    PT/INR - ( 11 Nov 2020 06:01 )   PT: 12.9 sec;   INR: 1.09 ratio         PTT - ( 11 Nov 2020 06:01 )  PTT:36.9 sec    CAPILLARY BLOOD GLUCOSE      POCT Blood Glucose.: 78 mg/dL (11 Nov 2020 05:50)  POCT Blood Glucose.: 105 mg/dL (10 Nov 2020 22:59)  POCT Blood Glucose.: 106 mg/dL (10 Nov 2020 16:28)  POCT Blood Glucose.: 99 mg/dL (10 Nov 2020 10:50)        ABG - ( 09 Nov 2020 12:03 )  pH, Arterial: 7.45  pH, Blood: x     /  pCO2: 37    /  pO2: 104   / HCO3: 26    / Base Excess: 2.0   /  SaO2: 96                    MEDICATIONS  (STANDING):  chlorhexidine 2% Cloths 1 Application(s) Topical daily  dextrose 5% + lactated ringers. 1000 milliLiter(s) (50 mL/Hr) IV Continuous <Continuous>  furosemide   Injectable 80 milliGRAM(s) IV Push every 12 hours  heparin   Injectable 5000 Unit(s) SubCutaneous every 12 hours  lactulose Syrup 10 Gram(s) Oral daily  levoFLOXacin IVPB      levoFLOXacin IVPB 750 milliGRAM(s) IV Intermittent every 48 hours  magnesium sulfate  IVPB 2 Gram(s) IV Intermittent once  midodrine. 10 milliGRAM(s) Oral three times a day  nystatin Powder 1 Application(s) Topical two times a day  pantoprazole  Injectable 40 milliGRAM(s) IV Push two times a day  potassium chloride  20 mEq/100 mL IVPB 20 milliEquivalent(s) IV Intermittent every 1 hour  rifAXIMin 550 milliGRAM(s) Oral two times a day  vancomycin  IVPB 500 milliGRAM(s) IV Intermittent every 24 hours    MEDICATIONS  (PRN):  sodium chloride 0.9% lock flush 10 milliLiter(s) IV Push every 1 hour PRN Pre/post blood products, medications, blood draw, and to maintain line patency      RADIOLOGY & ADDITIONAL TESTS:    Imaging Personally Reviewed:  [ ] YES  [ ] NO    REVIEW OF SYSTEMS:  unable    Consultant(s) Notes Reviewed:  [ ] YES  [ ] NO    PHYSICAL EXAM:  GENERAL: NAD, well-groomed, well-developed  HEAD:  Atraumatic, Normocephalic  EYES: EOMI, PERRLA, conjunctiva and sclera clear  ENMT: No tonsillar erythema, exudates, or enlargement; Moist mucous membranes, Good dentition, No lesions  NECK: Supple, No JVD, Normal thyroid  NERVOUS SYSTEM:  on vent   CHEST/LUNG: Clear to percussion bilaterally; No rales, rhonchi, wheezing, or rubs  HEART: Regular rate and rhythm; No murmurs, rubs, or gallops  ABDOMEN: Soft, Nontender, Nondistended; Bowel sounds present  EXTREMITIES:  2+ Peripheral Pulses, No clubbing, cyanosis, or edema  LYMPH: No lymphadenopathy noted  SKIN: No rashes or lesions    Care Discussed with Consultants/Other Providers [ x] YES  [ ] NO

## 2020-11-11 NOTE — PROGRESS NOTE ADULT - SUBJECTIVE AND OBJECTIVE BOX
Patient is seen and examined at the bed side, is afebrile.  She is doing better, on oxygen via NC. The kidney function continues to improve.        REVIEW OF SYSTEMS: All other review systems are negative        ALLERGIES: No Known Allergies        ICU Vital Signs Last 24 Hrs  T(C): 37 (11 Nov 2020 13:00), Max: 37.3 (10 Nov 2020 20:00)  T(F): 98.6 (11 Nov 2020 13:00), Max: 99.2 (10 Nov 2020 20:00)  HR: 79 (11 Nov 2020 15:00) (75 - 95)  BP: 96/58 (11 Nov 2020 15:00) (92/55 - 113/73)  BP(mean): 67 (11 Nov 2020 15:00) (62 - 82)  ABP: --  ABP(mean): --  RR: 37 (11 Nov 2020 15:00) (26 - 46)  SpO2: 98% (11 Nov 2020 15:00) (94% - 100%)        PHYSICAL EXAM:  GENERAL: remains extubated, on oxygen via NC  CHEST/LUNG: Not using accessory muscles   HEART: s1 and s2 present  ABDOMEN:  Nontender and  Nondistended  EXTREMITIES: B/L UE edematous  CNS: Awake and Alert         LABS:                        7.3    8.62  )-----------( 122      ( 11 Nov 2020 06:01 )             21.5                           7.9    8.40  )-----------( 126      ( 10 Nov 2020 05:42 )             23.1               11-11    141  |  103  |  31<H>  ----------------------------<  72  3.6   |  29  |  1.76<H>    Ca    8.3<L>      11 Nov 2020 13:33  Phos  2.9     11-11  Mg     2.3     11-11    TPro  5.3<L>  /  Alb  2.6<L>  /  TBili  5.6<H>  /  DBili  x   /  AST  159<H>  /  ALT  72<H>  /  AlkPhos  87  11-11 11-09    142  |  106  |  34<H>  ----------------------------<  118<H>  3.3<L>   |  27  |  2.01<H>    Ca    8.4      09 Nov 2020 05:58  Phos  2.7     11-09  Mg     1.9     11-09    TPro  5.1<L>  /  Alb  2.7<L>  /  TBili  6.1<H>  /  DBili  x   /  AST  144<H>  /  ALT  70<H>  /  AlkPhos  92  11-09          11-06    138  |  104  |  37<H>  ----------------------------<  81  3.9   |  25  |  2.37<H>    Ca    8.1<L>      06 Nov 2020 06:20  Phos  3.4     11-06  Mg     2.0     11-06    TPro  5.1<L>  /  Alb  2.8<L>  /  TBili  9.2<H>  /  DBili  x   /  AST  233<H>  /  ALT  99<H>  /  AlkPhos  96  11-06      Vancomycin Level, Trough (11.07.20 @ 21:49)   Vancomycin Level, Trough: 16.1:     Vancomycin Level, Trough (11.07.20 @ 07:08)   Vancomycin Level, Trough: 19.5    vanVancomycin Level, Trough (11.06.20 @ 06:20)   Vancomycin Level, Trough: 19.9:     Vancomycin Level, Trough (11.04.20 @ 06:12)   Vancomycin Level, Trough: 31.0:     Vancomycin Level, Trough (11.03.20 @ 04:11)   Vancomycin Level, Trough: 16.1:      Procalcitonin, Serum (10.15.20 @ 11:48)   Procalcitonin, Serum: 0.16: Procalcitonin (PCT) Interpretation (ng/mL) - Diagnosis of systemic   bacterial infection/sepsis         MEDICATIONS  (STANDING):    chlorhexidine 2% Cloths 1 Application(s) Topical daily  dextrose 5% + lactated ringers. 1000 milliLiter(s) (50 mL/Hr) IV Continuous <Continuous>  furosemide   Injectable 80 milliGRAM(s) IV Push every 12 hours  heparin   Injectable 5000 Unit(s) SubCutaneous every 12 hours  lactulose Syrup 10 Gram(s) Oral daily  midodrine. 10 milliGRAM(s) Oral three times a day  nystatin Powder 1 Application(s) Topical two times a day  pantoprazole  Injectable 40 milliGRAM(s) IV Push two times a day  rifAXIMin 550 milliGRAM(s) Oral two times a day      RADIOLOGY & ADDITIONAL TESTS:    11/4/20 : Xray Chest 1 View- PORTABLE-Routine (Xray Chest 1 View- PORTABLE-Routine in AM.) (11.04.20 @ 10:59) Reexpansion of the left lung with residual left pleural effusion and/or infiltrate.  Right pulmonary edema unchanged.  Tubes and catheters in satisfactory position.      10/25/20: Xray Chest 1 View- PORTABLE-Routine (Xray Chest 1 View- PORTABLE-Routine in AM.) (10.25.20 @ 09:21) Frontal expiratory view of the chest shows the heart to be similar in size. Endotracheal tube, right jugular line and feeding tube remain present.    The lungs show similar left upper lobe infiltrate with progression of right perihilar infiltrate. Pleural effusions are similar. There is no evidence of pneumothorax.      10/23/20 : Xray Chest 1 View-PORTABLE IMMEDIATE (Xray Chest 1 View-PORTABLE IMMEDIATE .) (10.23.20 @ 19:09) Feeding tube tip near GE junction as noted. Questionable right upper lobe infiltrate. Follow-up study is recommended as clinically warranted.      10/21/20 : CT Abdomen and Pelvis w/ Oral Cont and w/ IV Cont (10.21.20 @ 15:59) Mural thickening of the left colon and rectum. Liquid stool in the colon. Apparent segmental mural thickening of the mid to distal small bowel and the proximal duodenum. Findings may represent nonspecific enterocolitis, noninfectious inflammatory bowel disease, or ischemic bowel. Clinical correlation is recommended. The celiac axis artery, SMA, and KINA are patent without stenosis.    Dilatation of the mid small bowel is again noted. Oral contrast has reached the terminal ileum. Findings may represent small bowel obstruction, or ileus related to nonspecific enterocolitis.   Cholelithiasis.    Moderate to large ascites in the abdomen, increased since the previous examination. 5 mm nonspecific noncalcified left upper lobe lung nodule; if the patient's is in the high risk category (i.e. smoker), follow-up chest CT may be pursued in 12 months to ensure stability.    Combination of atelectasis and consolidation in the left lower lobe.    Possible 1.0 cm hypodense lesion in the left lobe of the thyroid. Thyroid ultrasound may be pursued for further evaluation.   Mild bilateral pleural effusions.    Aging determinate compression fracture at T5 vertebra.        10/13/20 : CT Abdomen and Pelvis w/ IV Cont (10.13.20 @ 18:50) Diffuse dilatation of small bowel loops without a clear transition is slightly increased, likely an ileus.  Decreased moderate ascites.    1.5 cm pancreatic versus peripancreatic soft tissue nodule    10/13/20 : Xray Chest 1 View- PORTABLE-Urgent (Xray Chest 1 View- PORTABLE-Urgent .) (10.13.20 @ 16:34) Clear lungs.          MICROBIOLOGY DATA:    Culture - Sputum . (10.26.20 @ 00:26)   - Ampicillin/Sulbactam: R <=8/4   - Cefazolin: R >16   - Ceftazidime: I 16   - Clindamycin: R >4   - Erythromycin: R >4   - Gentamicin: S <=1 Should not be used as monotherapy   - Levofloxacin: S <=0.5   - Linezolid: S 2   - Oxacillin: R >2   - Penicillin: R >8   - RIF- Rifampin: S <=1 Should not be used as monotherapy   - Tetra/Doxy: S <=1   - Trimethoprim/Sulfamethoxazole: S <=0.5/9.5   - Trimethoprim/Sulfamethoxazole: S <=0.5/9.5   - Vancomycin: S 1       MRSA/MSSA PCR (10.21.20 @ 09:41)   MRSA PCR Result.: Detected:       Culture - Blood (10.20.20 @ 22:09)   Specimen Source: .Blood Blood   Culture Results:  No growth to date.     Culture - Blood (10.20.20 @ 22:09)   Specimen Source: .Blood Blood   Culture Results:   No growth to date.     Culture - Blood in AM (10.16.20 @ 10:13)   Specimen Source: .Blood Blood-Peripheral   Culture Results: No growth to date.     Culture - Blood in AM (10.16.20 @ 10:13)   Specimen Source: .Blood Blood-Peripheral   Culture Results: No growth to date.     Culture - Blood (10.13.20 @ 22:23)   Growth in anaerobic bottle: Gram Negative Rods   Specimen Source: .Blood Blood-Peripheral   Organism: Blood Culture PCR   Culture Results: Growth in anaerobic bottle: Bacteroides fragilis   "Susceptibilities not performed"     Culture - Blood (10.13.20 @ 22:23)   Specimen Source: .Blood Blood-Peripheral   Culture Results:   No growth to date.

## 2020-11-11 NOTE — PROGRESS NOTE ADULT - ASSESSMENT
Patient is a 65y old  Female from home, lives with daughter, ambulates with walker with PMH of recurrent SBO's, s/p exp lap, SB resection in 2015, ex lap, ALBINA in 2018, DVT, PE, on Xarelto, IVC filter, chronic leg swelling, and anasarca, Now send in to the ER by her PCP, Dr. Ross, for evaluation of  generalized weakness and hypotension BP of 80/40 during office visit. On admission, she found to have no fever and BP was in lower normal range and no Leukocytosis. The CXR is clear and CT abd/pelvis consistent with Ileus. The ID consult requested to assist with evaluation of infectious etiology of episode of hypotension. Found to have Bacteroides bacteremia and now developed septic shock on Cefepime and Flagyl. Hence transferred to ICU. 10/20/20.    # Hypotension  at office- BP of 80/40 - resolved   #  Bacteroides fragilis Bacteremia - 10/13/20 - ? source most likely GI- Repeat Blood Cxs have NGTD 10/16/20  # Ileus  - h/o recurrent SBO and s/p EXp. LAp  # COVID 19 negative   # Septic shock ( Hypothermia + hypotensive)- transferred to ICU since requiring pressor- source GI, The CT abd/pelvis shows nonspecific enterocolitis, noninfectious inflammatory bowel disease, or ischemic bowel, along with ileus.   # Pneumonia- HCAP vs Aspiration - on CXR 10/24 and CT chest 10/21- sputum Cx grew Methicillin resistant Staphylococcus aureus  and Stenotrophomonas maltophilia.  # S/p extubated 11/9/20      would recommend:    1. Monitor OFF Abx  2. Monitor kidney function, is improving   3. Aspiration precaution  4. Agree with debridement of sacral ulcer  5. Frequent Repositioning       d/w ICU team     Attending Attestation:    Spent more than 45 minutes on total encounter, more than 50 % of the visit was spent counseling and/or coordinating care by the Attending physician.

## 2020-11-11 NOTE — PROGRESS NOTE ADULT - SUBJECTIVE AND OBJECTIVE BOX
INTERVAL HPI/OVERNIGHT EVENTS: ***    PRESSORS: [ ] YES [ ] NO  WHICH:    ANTIBIOTICS:                  DATE STARTED:  ANTIBIOTICS:                  DATE STARTED:  ANTIBIOTICS:                  DATE STARTED:    Antimicrobial:  rifAXIMin 550 milliGRAM(s) Oral two times a day    Cardiovascular:  furosemide   Injectable 80 milliGRAM(s) IV Push every 12 hours  midodrine. 10 milliGRAM(s) Oral three times a day    Pulmonary:    Hematalogic:  heparin   Injectable 5000 Unit(s) SubCutaneous every 12 hours    Other:  chlorhexidine 2% Cloths 1 Application(s) Topical daily  dextrose 5% + lactated ringers. 1000 milliLiter(s) IV Continuous <Continuous>  lactulose Syrup 10 Gram(s) Oral daily  nystatin Powder 1 Application(s) Topical two times a day  pantoprazole  Injectable 40 milliGRAM(s) IV Push two times a day  potassium chloride  20 mEq/100 mL IVPB 20 milliEquivalent(s) IV Intermittent every 1 hour  sodium chloride 0.9% lock flush 10 milliLiter(s) IV Push every 1 hour PRN    chlorhexidine 2% Cloths 1 Application(s) Topical daily  dextrose 5% + lactated ringers. 1000 milliLiter(s) IV Continuous <Continuous>  furosemide   Injectable 80 milliGRAM(s) IV Push every 12 hours  heparin   Injectable 5000 Unit(s) SubCutaneous every 12 hours  lactulose Syrup 10 Gram(s) Oral daily  midodrine. 10 milliGRAM(s) Oral three times a day  nystatin Powder 1 Application(s) Topical two times a day  pantoprazole  Injectable 40 milliGRAM(s) IV Push two times a day  potassium chloride  20 mEq/100 mL IVPB 20 milliEquivalent(s) IV Intermittent every 1 hour  rifAXIMin 550 milliGRAM(s) Oral two times a day  sodium chloride 0.9% lock flush 10 milliLiter(s) IV Push every 1 hour PRN    Drug Dosing Weight  Height (cm): 167.6 (21 Oct 2020 02:26)  Weight (kg): 56.2 (21 Oct 2020 02:26)  BMI (kg/m2): 20 (21 Oct 2020 02:26)  BSA (m2): 1.63 (21 Oct 2020 02:26)    CENTRAL LINE: [ ] YES [ ] NO  LOCATION:         TREJO: [ ] YES [ ] NO          A-LINE:  [ ] YES [ ] NO  LOCATION:             ICU Vital Signs Last 24 Hrs  T(C): 36.9 (11 Nov 2020 08:00), Max: 37.3 (10 Nov 2020 20:00)  T(F): 98.5 (11 Nov 2020 08:00), Max: 99.2 (10 Nov 2020 20:00)  HR: 79 (11 Nov 2020 09:00) (75 - 95)  BP: 101/60 (11 Nov 2020 09:00) (94/55 - 113/73)  BP(mean): 70 (11 Nov 2020 09:00) (63 - 82)  ABP: --  ABP(mean): --  RR: 38 (11 Nov 2020 09:00) (23 - 46)  SpO2: 98% (11 Nov 2020 09:00) (95% - 100%)      ABG - ( 09 Nov 2020 12:03 )  pH, Arterial: 7.45  pH, Blood: x     /  pCO2: 37    /  pO2: 104   / HCO3: 26    / Base Excess: 2.0   /  SaO2: 96                    11-10 @ 07:01  -  11-11 @ 07:00  --------------------------------------------------------  IN: 500 mL / OUT: 2630 mL / NET: -2130 mL            REVIEW OF SYSTEMS:    CONSTITUTIONAL: No weakness, fevers or chills  NECK: No pain or stiffness  RESPIRATORY: No cough, wheezing, hemoptysis; No shortness of breath  CARDIOVASCULAR: No chest pain or palpitations  GASTROINTESTINAL: No abdominal or epigastric pain. No nausea, vomiting, No diarrhea or constipation. No melena or hematochezia.  GENITOURINARY: No dysuria, frequency or hematuria  NEUROLOGICAL: No numbness or weakness  All other review of systems is negative unless indicated above      PHYSICAL EXAM:    GENERAL: NAD, well-groomed, well-developed  EYES: EOMI, PERRLA,   NECK: Supple, No JVD; Normal thyroid; Trachea midline  NERVOUS SYSTEM:  Alert & Oriented X3,  Motor Strength 5/5 B/L upper and lower extremities; DTRs 2+ intact and symmetric  CHEST/LUNG: No rales, rhonchi, wheezing   HEART: Regular rate and rhythm; No murmurs,   ABDOMEN: Soft, Nontender, Nondistended; Bowel sounds present  EXTREMITIES:  2+ Peripheral Pulses, No clubbing, cyanosis, or edema        LABS:  CBC Full  -  ( 11 Nov 2020 06:01 )  WBC Count : 8.62 K/uL  RBC Count : 2.39 M/uL  Hemoglobin : 7.3 g/dL  Hematocrit : 21.5 %  Platelet Count - Automated : 122 K/uL  Mean Cell Volume : 90.0 fl  Mean Cell Hemoglobin : 30.5 pg  Mean Cell Hemoglobin Concentration : 34.0 gm/dL  Auto Neutrophil # : x  Auto Lymphocyte # : x  Auto Monocyte # : x  Auto Eosinophil # : x  Auto Basophil # : x  Auto Neutrophil % : x  Auto Lymphocyte % : x  Auto Monocyte % : x  Auto Eosinophil % : x  Auto Basophil % : x    11-11    140  |  105  |  31<H>  ----------------------------<  70  2.8<LL>   |  29  |  1.79<H>    Ca    8.3<L>      11 Nov 2020 06:01  Phos  2.8     11-11  Mg     1.6     11-11    TPro  5.3<L>  /  Alb  2.6<L>  /  TBili  5.6<H>  /  DBili  x   /  AST  159<H>  /  ALT  72<H>  /  AlkPhos  87  11-11    PT/INR - ( 11 Nov 2020 06:01 )   PT: 12.9 sec;   INR: 1.09 ratio         PTT - ( 11 Nov 2020 06:01 )  PTT:36.9 sec        RADIOLOGY & ADDITIONAL STUDIES REVIEWED:  ***    [ ]GOALS OF CARE DISCUSSION WITH PATIENT/FAMILY/PROXY:    CRITICAL CARE TIME SPENT: 35 minutes INTERVAL HPI/OVERNIGHT EVENTS:  patient was seen by wound care, planned for I&D today   patient was NPO after mid night   K in the morning 2.9 ,replaced with 100meq of potassium , Mg replated as well, will repeat BMP, mg and phos before procedure   patient s/p heparin sqX1 in the morning , will hold for procedure   LFT still mildly elevated however almost stable   Kidney function and U/O improving with Lasix 80mg BID      PRESSORS: [ ] YES [ ] NO  WHICH:    ANTIBIOTICS:                  DATE STARTED:  ANTIBIOTICS:                  DATE STARTED:  ANTIBIOTICS:                  DATE STARTED:    Antimicrobial:  rifAXIMin 550 milliGRAM(s) Oral two times a day    Cardiovascular:  furosemide   Injectable 80 milliGRAM(s) IV Push every 12 hours  midodrine. 10 milliGRAM(s) Oral three times a day    Pulmonary:    Hematalogic:  heparin   Injectable 5000 Unit(s) SubCutaneous every 12 hours    Other:  chlorhexidine 2% Cloths 1 Application(s) Topical daily  dextrose 5% + lactated ringers. 1000 milliLiter(s) IV Continuous <Continuous>  lactulose Syrup 10 Gram(s) Oral daily  nystatin Powder 1 Application(s) Topical two times a day  pantoprazole  Injectable 40 milliGRAM(s) IV Push two times a day  potassium chloride  20 mEq/100 mL IVPB 20 milliEquivalent(s) IV Intermittent every 1 hour  sodium chloride 0.9% lock flush 10 milliLiter(s) IV Push every 1 hour PRN    chlorhexidine 2% Cloths 1 Application(s) Topical daily  dextrose 5% + lactated ringers. 1000 milliLiter(s) IV Continuous <Continuous>  furosemide   Injectable 80 milliGRAM(s) IV Push every 12 hours  heparin   Injectable 5000 Unit(s) SubCutaneous every 12 hours  lactulose Syrup 10 Gram(s) Oral daily  midodrine. 10 milliGRAM(s) Oral three times a day  nystatin Powder 1 Application(s) Topical two times a day  pantoprazole  Injectable 40 milliGRAM(s) IV Push two times a day  potassium chloride  20 mEq/100 mL IVPB 20 milliEquivalent(s) IV Intermittent every 1 hour  rifAXIMin 550 milliGRAM(s) Oral two times a day  sodium chloride 0.9% lock flush 10 milliLiter(s) IV Push every 1 hour PRN    Drug Dosing Weight  Height (cm): 167.6 (21 Oct 2020 02:26)  Weight (kg): 56.2 (21 Oct 2020 02:26)  BMI (kg/m2): 20 (21 Oct 2020 02:26)  BSA (m2): 1.63 (21 Oct 2020 02:26)    CENTRAL LINE: [ ] YES [ ] NO  LOCATION:         TREJO: [ ] YES [ ] NO          A-LINE:  [ ] YES [ ] NO  LOCATION:             ICU Vital Signs Last 24 Hrs  T(C): 36.9 (11 Nov 2020 08:00), Max: 37.3 (10 Nov 2020 20:00)  T(F): 98.5 (11 Nov 2020 08:00), Max: 99.2 (10 Nov 2020 20:00)  HR: 79 (11 Nov 2020 09:00) (75 - 95)  BP: 101/60 (11 Nov 2020 09:00) (94/55 - 113/73)  BP(mean): 70 (11 Nov 2020 09:00) (63 - 82)  ABP: --  ABP(mean): --  RR: 38 (11 Nov 2020 09:00) (23 - 46)  SpO2: 98% (11 Nov 2020 09:00) (95% - 100%)      ABG - ( 09 Nov 2020 12:03 )  pH, Arterial: 7.45  pH, Blood: x     /  pCO2: 37    /  pO2: 104   / HCO3: 26    / Base Excess: 2.0   /  SaO2: 96                    11-10 @ 07:01  -  11-11 @ 07:00  --------------------------------------------------------  IN: 500 mL / OUT: 2630 mL / NET: -2130 mL        GENERAL: extubated , not sedated , anasarca improved   HEAD:  Atraumatic, Normocephalic  EYES: b/l pupil reactive to light.   NECK: Supple, normal appearance, No JVD; Normal thyroid; Trachea midline, CVC in [facility] 10/20  NERVOUS SYSTEM:  follows commands.  CHEST/LUNG: rales on basis   HEART: Regular rate and rhythm; No murmurs, rubs, or gallops  ABDOMEN: Soft, Nontender, mildly distended; Bowel sounds present  EXTREMITIES:  1+ Peripheral Pulses, Pitting edema +2 (improved)  LYMPH: No lymphadenopathy noted  SKIN: Decubitus ulcer           LABS:  CBC Full  -  ( 11 Nov 2020 06:01 )  WBC Count : 8.62 K/uL  RBC Count : 2.39 M/uL  Hemoglobin : 7.3 g/dL  Hematocrit : 21.5 %  Platelet Count - Automated : 122 K/uL  Mean Cell Volume : 90.0 fl  Mean Cell Hemoglobin : 30.5 pg  Mean Cell Hemoglobin Concentration : 34.0 gm/dL  Auto Neutrophil # : x  Auto Lymphocyte # : x  Auto Monocyte # : x  Auto Eosinophil # : x  Auto Basophil # : x  Auto Neutrophil % : x  Auto Lymphocyte % : x  Auto Monocyte % : x  Auto Eosinophil % : x  Auto Basophil % : x    11-11    140  |  105  |  31<H>  ----------------------------<  70  2.8<LL>   |  29  |  1.79<H>    Ca    8.3<L>      11 Nov 2020 06:01  Phos  2.8     11-11  Mg     1.6     11-11    TPro  5.3<L>  /  Alb  2.6<L>  /  TBili  5.6<H>  /  DBili  x   /  AST  159<H>  /  ALT  72<H>  /  AlkPhos  87  11-11    PT/INR - ( 11 Nov 2020 06:01 )   PT: 12.9 sec;   INR: 1.09 ratio         PTT - ( 11 Nov 2020 06:01 )  PTT:36.9 sec        RADIOLOGY & ADDITIONAL STUDIES REVIEWED:  ***    [ ]GOALS OF CARE DISCUSSION WITH PATIENT/FAMILY/PROXY:    CRITICAL CARE TIME SPENT: 35 minutes

## 2020-11-11 NOTE — PROGRESS NOTE ADULT - SUBJECTIVE AND OBJECTIVE BOX
Chief Complaint:  Patient is a 65y old  Female who presents with a chief complaint of Hypotension (2020 10:37)      Reason for consult: to r/o Budd Chiari Interval Events:   Patient was seen and examined at bedside. Extubated, on RA SpO2 99%. Awake, alert, oriented to person, place and time till year and month. Denies abdominal pain. Pending sacral decubiti debridement.      Hospital Medications:  chlorhexidine 2% Cloths 1 Application(s) Topical daily  dextrose 5% + lactated ringers. 1000 milliLiter(s) IV Continuous <Continuous>  furosemide   Injectable 80 milliGRAM(s) IV Push every 12 hours  heparin   Injectable 5000 Unit(s) SubCutaneous every 12 hours  lactulose Syrup 10 Gram(s) Oral daily  midodrine. 10 milliGRAM(s) Oral three times a day  nystatin Powder 1 Application(s) Topical two times a day  pantoprazole  Injectable 40 milliGRAM(s) IV Push two times a day  rifAXIMin 550 milliGRAM(s) Oral two times a day  sodium chloride 0.9% lock flush 10 milliLiter(s) IV Push every 1 hour PRN      ROS:   General:  No  fevers, chills  ENT:  No sore throat, pain, runny nose  CV:  No pain, palpitations  Pulm:  No dyspnea, cough  GI:  No abdominal pain, no N/V, was not fed yet, no diarrhea or melena  Muscle:  No pain  Neuro:  Awake, alert, oriented to person, place and time till year and month      PHYSICAL EXAM:   Vital Signs:  Vital Signs Last 24 Hrs  T(C): 37 (2020 13:00), Max: 37.3 (10 Nov 2020 20:00)  T(F): 98.6 (2020 13:00), Max: 99.2 (10 Nov 2020 20:00)  HR: 79 (2020 15:00) (75 - 95)  BP: 96/58 (2020 15:00) (92/55 - 113/73)  BP(mean): 67 (2020 15:00) (62 - 82)  RR: 37 (2020 15:00) (26 - 46)  SpO2: 98% (2020 15:00) (94% - 100%)  Daily     Daily Weight in k.5 (2020 08:00)    GENERAL: no acute distress  NEURO: alert, no asterixis  HEENT: anicteric sclera, no conjunctival pallor appreciated  CHEST: no respiratory distress, no accessory muscle use  CARDIAC: regular rate, rhythm  ABDOMEN: soft, non-tender, non-distended, no rebound or guarding  EXTREMITIES: warm, well perfused, no edema  SKIN: no lesions noted    LABS: reviewed                        7.3    8.62  )-----------( 122      ( 2020 06:01 )             21.5         141  |  103  |  31<H>  ----------------------------<  72  3.6   |  29  |  1.76<H>    Ca    8.3<L>      2020 13:33  Phos  2.9       Mg     2.3         TPro  5.3<L>  /  Alb  2.6<L>  /  TBili  5.6<H>  /  DBili  x   /  AST  159<H>  /  ALT  72<H>  /  AlkPhos  87      LIVER FUNCTIONS - ( 2020 06:01 )  Alb: 2.6 g/dL / Pro: 5.3 g/dL / ALK PHOS: 87 U/L / ALT: 72 U/L DA / AST: 159 U/L / GGT: x             Interval Diagnostic Studies: see sunrise for full report

## 2020-11-11 NOTE — BRIEF OPERATIVE NOTE - NSICDXBRIEFPOSTOP_GEN_ALL_CORE_FT
POST-OP DIAGNOSIS:  Decubitus ulcer of sacral region, unstageable 15-Nov-2020 15:55:21  Rachel Burks N

## 2020-11-11 NOTE — PROGRESS NOTE ADULT - SUBJECTIVE AND OBJECTIVE BOX
awake, alert, no fever,  no sob  BP stable     Pt is seen and examined  pt is awake and lying in bed  pt seems comfortable and denies any complaints at this time    ROS:  Negative except for:    MEDICATIONS  (STANDING):  furosemide   Injectable 80 milliGRAM(s) IV Push every 12 hours  heparin   Injectable 5000 Unit(s) SubCutaneous every 12 hours  lactulose Syrup 10 Gram(s) Oral daily  midodrine. 10 milliGRAM(s) Oral three times a day  pantoprazole  Injectable 40 milliGRAM(s) IV Push two times a day  rifAXIMin 550 milliGRAM(s) Oral two times a day    MEDICATIONS  (PRN):      Allergies    No Known Allergies    Intolerances        Vital Signs Last 24 Hrs  T(C): 36.4 (11 Nov 2020 19:45), Max: 37.1 (11 Nov 2020 17:00)  T(F): 97.6 (11 Nov 2020 19:45), Max: 98.7 (11 Nov 2020 17:00)  HR: 77 (11 Nov 2020 20:30) (75 - 88)  BP: 103/61 (11 Nov 2020 20:30) (92/55 - 113/73)  BP(mean): 71 (11 Nov 2020 20:30) (62 - 82)  RR: 27 (11 Nov 2020 20:30) (26 - 40)  SpO2: 100% (11 Nov 2020 20:30) (92% - 100%)    PHYSICAL EXAM  General: adult in NAD  HEENT: clear oropharynx, anicteric sclera, pink conjunctiva  Neck: supple  CV: normal S1/S2 with no murmur rubs or gallops  Lungs: positive air movement b/l ant lungs,clear to auscultation, no wheezes, no rales  Abdomen: soft non-tender non-distended, no hepatosplenomegaly  Ext: no clubbing cyanosis or edema  Skin: no rashes and no petechiae  Neuro: alert and oriented X 4, no focal deficits  LABS:                          7.3    8.62  )-----------( 122      ( 11 Nov 2020 06:01 )             21.5         Mean Cell Volume : 90.0 fl  Mean Cell Hemoglobin : 30.5 pg  Mean Cell Hemoglobin Concentration : 34.0 gm/dL  Auto Neutrophil # : x  Auto Lymphocyte # : x  Auto Monocyte # : x  Auto Eosinophil # : x  Auto Basophil # : x  Auto Neutrophil % : x  Auto Lymphocyte % : x  Auto Monocyte % : x  Auto Eosinophil % : x  Auto Basophil % : x    Serial CBC  Hematocrit 21.5  Hemoglobin 7.3  Plat 122  RBC 2.39  WBC 8.62  Serial CBC  Hematocrit 23.1  Hemoglobin 7.9  Plat 126  RBC 2.61  WBC 8.40  Serial CBC  Hematocrit 24.8  Hemoglobin 8.8  Plat 137  RBC 2.83  WBC 7.43  Serial CBC  Hematocrit 23.3  Hemoglobin 8.2  Plat 128  RBC 2.67  WBC 6.62  Serial CBC  Hematocrit 24.2  Hemoglobin 8.2  Plat 122  RBC 2.75  WBC 6.83    11-11    141  |  103  |  31<H>  ----------------------------<  72  3.6   |  29  |  1.76<H>    Ca    8.3<L>      11 Nov 2020 13:33  Phos  2.9     11-11  Mg     2.3     11-11    TPro  5.3<L>  /  Alb  2.6<L>  /  TBili  5.6<H>  /  DBili  x   /  AST  159<H>  /  ALT  72<H>  /  AlkPhos  87  11-11      PT/INR - ( 11 Nov 2020 06:01 )   PT: 12.9 sec;   INR: 1.09 ratio         PTT - ( 11 Nov 2020 06:01 )  PTT:36.9 sec              BLOOD SMEAR INTERPRETATION:       RADIOLOGY & ADDITIONAL STUDIES:

## 2020-11-12 NOTE — PROGRESS NOTE ADULT - SUBJECTIVE AND OBJECTIVE BOX
INTERVAL HPI/OVERNIGHT EVENTS:    Pt seen and examined at bedside. No acute events overnight.     Vital Signs Last 24 Hrs  T(C): 37.3 (12 Nov 2020 06:00), Max: 37.3 (12 Nov 2020 06:00)  T(F): 99.2 (12 Nov 2020 06:00), Max: 99.2 (12 Nov 2020 06:00)  HR: 77 (12 Nov 2020 07:00) (72 - 85)  BP: 98/57 (12 Nov 2020 07:00) (92/55 - 106/62)  BP(mean): 67 (12 Nov 2020 07:00) (62 - 74)  RR: 29 (12 Nov 2020 07:00) (25 - 38)  SpO2: 100% (12 Nov 2020 07:00) (92% - 100%)  I&O's Detail    11 Nov 2020 07:01  -  12 Nov 2020 07:00  --------------------------------------------------------  IN:    dextrose 5% + lactated ringers: 450 mL    IV PiggyBack: 50 mL    IV PiggyBack: 300 mL    Oral Fluid: 170 mL  Total IN: 970 mL    OUT:    Indwelling Catheter - Urethral (mL): 2060 mL  Total OUT: 2060 mL    Total NET: -1090 mL        lactulose Syrup 10 Gram(s) Oral daily  pantoprazole  Injectable 40 milliGRAM(s) IV Push two times a day  rifAXIMin 550 milliGRAM(s) Oral two times a day      Physical Exam  General: No acute distress  Skin: sacrum wound measuring [id number], granulating tissue at base, no active drainage, wet to dry applied         Labs:                        6.9    9.27  )-----------( x        ( 12 Nov 2020 06:34 )             20.8     11-12    143  |  105  |  29<H>  ----------------------------<  61<L>  3.0<L>   |  28  |  1.65<H>    Ca    8.2<L>      12 Nov 2020 06:34  Phos  3.3     11-12  Mg     2.2     11-12    TPro  5.2<L>  /  Alb  2.3<L>  /  TBili  5.3<H>  /  DBili  x   /  AST  147<H>  /  ALT  67<H>  /  AlkPhos  89  11-12    PT/INR - ( 11 Nov 2020 06:01 )   PT: 12.9 sec;   INR: 1.09 ratio         PTT - ( 11 Nov 2020 06:01 )  PTT:36.9 sec

## 2020-11-12 NOTE — PROGRESS NOTE ADULT - ASSESSMENT
Patient is a 65y old  Female from home, lives with daughter, ambulates with walker with PMH of recurrent SBO's, s/p exp lap, SB resection in 2015, ex lap, ALBINA in 2018, DVT, PE, on Xarelto, IVC filter, chronic leg swelling, and anasarca, Now send in to the ER by her PCP, Dr. Ross, for evaluation of  generalized weakness and hypotension BP of 80/40 during office visit. On admission, she found to have no fever and BP was in lower normal range and no Leukocytosis. The CXR is clear and CT abd/pelvis consistent with Ileus. The ID consult requested to assist with evaluation of infectious etiology of episode of hypotension. Found to have Bacteroides bacteremia and now developed septic shock on Cefepime and Flagyl. Hence transferred to ICU. 10/20/20.    # Hypotension  at office- BP of 80/40 - resolved   #  Bacteroides fragilis Bacteremia - 10/13/20 - ? source most likely GI- Repeat Blood Cxs have NGTD 10/16/20  # Ileus  - h/o recurrent SBO and s/p EXp. LAp  # COVID 19 negative   # Septic shock ( Hypothermia + hypotensive)- transferred to ICU since requiring pressor- source GI, The CT abd/pelvis shows nonspecific enterocolitis, noninfectious inflammatory bowel disease, or ischemic bowel, along with ileus.   # Pneumonia- HCAP vs Aspiration - on CXR 10/24 and CT chest 10/21- sputum Cx grew Methicillin resistant Staphylococcus aureus  and Stenotrophomonas maltophilia.  # S/p extubated 11/9/20      would recommend:    1. Monitor OFF Abx  2. Monitor kidney function, is improving   3. Aspiration precaution  4. Agree with debridement of sacral ulcer  5. Frequent Repositioning       d/w ICU team     Attending Attestation:    Spent more than 45 minutes on total encounter, more than 50 % of the visit was spent counseling and/or coordinating care by the Attending physician.   Patient is a 65y old  Female from home, lives with daughter, ambulates with walker with PMH of recurrent SBO's, s/p exp lap, SB resection in 2015, ex lap, ALBINA in 2018, DVT, PE, on Xarelto, IVC filter, chronic leg swelling, and anasarca, Now send in to the ER by her PCP, Dr. Ross, for evaluation of  generalized weakness and hypotension BP of 80/40 during office visit. On admission, she found to have no fever and BP was in lower normal range and no Leukocytosis. The CXR is clear and CT abd/pelvis consistent with Ileus. The ID consult requested to assist with evaluation of infectious etiology of episode of hypotension. Found to have Bacteroides bacteremia and now developed septic shock on Cefepime and Flagyl. Hence transferred to ICU. 10/20/20.    # Hypotension  at office- BP of 80/40 - resolved   #  Bacteroides fragilis Bacteremia - 10/13/20 - ? source most likely GI- Repeat Blood Cxs have NGTD 10/16/20  # Ileus  - h/o recurrent SBO and s/p EXp. LAp  # COVID 19 negative   # Septic shock ( Hypothermia + hypotensive)- transferred to ICU since requiring pressor- source GI, The CT abd/pelvis shows nonspecific enterocolitis, noninfectious inflammatory bowel disease, or ischemic bowel, along with ileus.   # Pneumonia- HCAP vs Aspiration - on CXR 10/24 and CT chest 10/21- sputum Cx grew Methicillin resistant Staphylococcus aureus  and Stenotrophomonas maltophilia.  # S/p extubated 11/9/20      would recommend:    1. Monitor kidney function, is improving    2. Monitor OFF Abx  3. Aspiration precaution  4. Sacral Wound care   5. Frequent Repositioning       d/w ICU team     Attending Attestation:    Spent more than 45 minutes on total encounter, more than 50 % of the visit was spent counseling and/or coordinating care by the Attending physician.

## 2020-11-12 NOTE — PROGRESS NOTE ADULT - SUBJECTIVE AND OBJECTIVE BOX
Patient was seen and examined  Patient is a 65y old  Female who presents with a chief complaint of Hypotension (12 Nov 2020 08:32)      INTERVAL HPI/OVERNIGHT EVENTS:  T(C): 37.3 (11-12-20 @ 06:00), Max: 37.3 (11-12-20 @ 06:00)  HR: 77 (11-12-20 @ 07:00) (72 - 85)  BP: 98/57 (11-12-20 @ 07:00) (92/55 - 106/62)  RR: 29 (11-12-20 @ 07:00) (25 - 37)  SpO2: 100% (11-12-20 @ 07:00) (92% - 100%)  Wt(kg): --  I&O's Summary    11 Nov 2020 07:01  -  12 Nov 2020 07:00  --------------------------------------------------------  IN: 970 mL / OUT: 2160 mL / NET: -1190 mL    12 Nov 2020 07:01  -  12 Nov 2020 09:34  --------------------------------------------------------  IN: 200 mL / OUT: 125 mL / NET: 75 mL        LABS:                        6.9    9.27  )-----------( 141      ( 12 Nov 2020 06:34 )             20.8     11-12    143  |  105  |  29<H>  ----------------------------<  61<L>  3.0<L>   |  28  |  1.65<H>    Ca    8.2<L>      12 Nov 2020 06:34  Phos  3.3     11-12  Mg     2.2     11-12    TPro  5.2<L>  /  Alb  2.3<L>  /  TBili  5.3<H>  /  DBili  x   /  AST  147<H>  /  ALT  67<H>  /  AlkPhos  89  11-12    PT/INR - ( 11 Nov 2020 06:01 )   PT: 12.9 sec;   INR: 1.09 ratio         PTT - ( 11 Nov 2020 06:01 )  PTT:36.9 sec    CAPILLARY BLOOD GLUCOSE      POCT Blood Glucose.: 128 mg/dL (12 Nov 2020 07:12)  POCT Blood Glucose.: 65 mg/dL (12 Nov 2020 06:14)  POCT Blood Glucose.: 87 mg/dL (11 Nov 2020 23:16)  POCT Blood Glucose.: 81 mg/dL (11 Nov 2020 18:32)  POCT Blood Glucose.: 89 mg/dL (11 Nov 2020 12:04)              MEDICATIONS  (STANDING):  furosemide   Injectable 80 milliGRAM(s) IV Push every 12 hours  heparin   Injectable 5000 Unit(s) SubCutaneous every 12 hours  lactulose Syrup 10 Gram(s) Oral daily  midodrine. 10 milliGRAM(s) Oral three times a day  pantoprazole   Suspension 40 milliGRAM(s) Enteral Tube daily  potassium chloride   Powder 40 milliEquivalent(s) Oral every 4 hours  rifAXIMin 550 milliGRAM(s) Oral two times a day    MEDICATIONS  (PRN):      RADIOLOGY & ADDITIONAL TESTS:    Imaging Personally Reviewed:  [ ] YES  [ ] NO    REVIEW OF SYSTEMS:  unable   ALLERY AND IMMUNOLOGIC: No hives or eczema      Consultant(s) Notes Reviewed:  [ ] YES  [ ] NO    PHYSICAL EXAM:  GENERAL: NAD, well-groomed, well-developed  HEAD:  Atraumatic, Normocephalic  EYES: EOMI, PERRLA, conjunctiva and sclera clear  ENMT: No tonsillar erythema, exudates, or enlargement; Moist mucous membranes, Good dentition, No lesions  NECK: Supple, No JVD, Normal thyroid  NERVOUS SYSTEM:  awake   CHEST/LUNG: Clear to percussion bilaterally; No rales, rhonchi, wheezing, or rubs  HEART: Regular rate and rhythm; No murmurs, rubs, or gallops  ABDOMEN: Soft, Nontender, Nondistended; Bowel sounds present  EXTREMITIES:  2+ Peripheral Pulses, No clubbing, cyanosis, or edema  LYMPH: No lymphadenopathy noted  SKIN: No rashes or lesions    Care Discussed with Consultants/Other Providers [ x] YES  [ ] NO

## 2020-11-12 NOTE — PROGRESS NOTE ADULT - SUBJECTIVE AND OBJECTIVE BOX
Patient is seen and examined at the bed side, is afebrile.  She is doing better, on oxygen via NC. The kidney function continues to improve.        REVIEW OF SYSTEMS: All other review systems are negative        ALLERGIES: No Known Allergies        ICU Vital Signs Last 24 Hrs  T(C): 36.8 (12 Nov 2020 15:44), Max: 37.3 (12 Nov 2020 06:00)  T(F): 98.3 (12 Nov 2020 15:44), Max: 99.2 (12 Nov 2020 06:00)  HR: 75 (12 Nov 2020 16:00) (72 - 85)  BP: 110/65 (12 Nov 2020 16:00) (96/58 - 113/65)  BP(mean): 74 (12 Nov 2020 16:00) (67 - 76)  ABP: --  ABP(mean): --  RR: 23 (12 Nov 2020 16:00) (23 - 37)  SpO2: 100% (12 Nov 2020 16:00) (96% - 100%)        PHYSICAL EXAM:  GENERAL: remains extubated, on oxygen via NC  CHEST/LUNG: Not using accessory muscles   HEART: s1 and s2 present  ABDOMEN:  Nontender and  Nondistended  EXTREMITIES: B/L UE edematous  CNS: Awake and Alert         LABS:                        6.9    9.27  )-----------( 141      ( 12 Nov 2020 06:34 )             20.8                           7.3    8.62  )-----------( 122      ( 11 Nov 2020 06:01 )             21.5       11-12    143  |  105  |  29<H>  ----------------------------<  61<L>  3.0<L>   |  28  |  1.65<H>    Ca    8.2<L>      12 Nov 2020 06:34  Phos  3.3     11-12  Mg     2.2     11-12    TPro  5.2<L>  /  Alb  2.3<L>  /  TBili  5.3<H>  /  DBili  x   /  AST  147<H>  /  ALT  67<H>  /  AlkPhos  89  11-12    11-11    141  |  103  |  31<H>  ----------------------------<  72  3.6   |  29  |  1.76<H>    Ca    8.3<L>      11 Nov 2020 13:33  Phos  2.9     11-11  Mg     2.3     11-11    TPro  5.3<L>  /  Alb  2.6<L>  /  TBili  5.6<H>  /  DBili  x   /  AST  159<H>  /  ALT  72<H>  /  AlkPhos  87  11-11         11-06    138  |  104  |  37<H>  ----------------------------<  81  3.9   |  25  |  2.37<H>    Ca    8.1<L>      06 Nov 2020 06:20  Phos  3.4     11-06  Mg     2.0     11-06    TPro  5.1<L>  /  Alb  2.8<L>  /  TBili  9.2<H>  /  DBili  x   /  AST  233<H>  /  ALT  99<H>  /  AlkPhos  96  11-06      Vancomycin Level, Trough (11.07.20 @ 21:49)   Vancomycin Level, Trough: 16.1:     Vancomycin Level, Trough (11.07.20 @ 07:08)   Vancomycin Level, Trough: 19.5    vanVancomycin Level, Trough (11.06.20 @ 06:20)   Vancomycin Level, Trough: 19.9:     Vancomycin Level, Trough (11.04.20 @ 06:12)   Vancomycin Level, Trough: 31.0:     Vancomycin Level, Trough (11.03.20 @ 04:11)   Vancomycin Level, Trough: 16.1:      Procalcitonin, Serum (10.15.20 @ 11:48)   Procalcitonin, Serum: 0.16: Procalcitonin (PCT) Interpretation (ng/mL) - Diagnosis of systemic   bacterial infection/sepsis         MEDICATIONS  (STANDING):    furosemide   Injectable 80 milliGRAM(s) IV Push every 12 hours  heparin   Injectable 5000 Unit(s) SubCutaneous every 12 hours  lactulose Syrup 10 Gram(s) Oral daily  midodrine. 10 milliGRAM(s) Oral three times a day  pantoprazole   Suspension 40 milliGRAM(s) Enteral Tube daily  rifAXIMin 550 milliGRAM(s) Oral two times a day      RADIOLOGY & ADDITIONAL TESTS:    11/4/20 : Xray Chest 1 View- PORTABLE-Routine (Xray Chest 1 View- PORTABLE-Routine in AM.) (11.04.20 @ 10:59) Reexpansion of the left lung with residual left pleural effusion and/or infiltrate.  Right pulmonary edema unchanged.  Tubes and catheters in satisfactory position.      10/25/20: Xray Chest 1 View- PORTABLE-Routine (Xray Chest 1 View- PORTABLE-Routine in AM.) (10.25.20 @ 09:21) Frontal expiratory view of the chest shows the heart to be similar in size. Endotracheal tube, right jugular line and feeding tube remain present.    The lungs show similar left upper lobe infiltrate with progression of right perihilar infiltrate. Pleural effusions are similar. There is no evidence of pneumothorax.      10/23/20 : Xray Chest 1 View-PORTABLE IMMEDIATE (Xray Chest 1 View-PORTABLE IMMEDIATE .) (10.23.20 @ 19:09) Feeding tube tip near GE junction as noted. Questionable right upper lobe infiltrate. Follow-up study is recommended as clinically warranted.      10/21/20 : CT Abdomen and Pelvis w/ Oral Cont and w/ IV Cont (10.21.20 @ 15:59) Mural thickening of the left colon and rectum. Liquid stool in the colon. Apparent segmental mural thickening of the mid to distal small bowel and the proximal duodenum. Findings may represent nonspecific enterocolitis, noninfectious inflammatory bowel disease, or ischemic bowel. Clinical correlation is recommended. The celiac axis artery, SMA, and KINA are patent without stenosis.    Dilatation of the mid small bowel is again noted. Oral contrast has reached the terminal ileum. Findings may represent small bowel obstruction, or ileus related to nonspecific enterocolitis.   Cholelithiasis.    Moderate to large ascites in the abdomen, increased since the previous examination. 5 mm nonspecific noncalcified left upper lobe lung nodule; if the patient's is in the high risk category (i.e. smoker), follow-up chest CT may be pursued in 12 months to ensure stability.    Combination of atelectasis and consolidation in the left lower lobe.    Possible 1.0 cm hypodense lesion in the left lobe of the thyroid. Thyroid ultrasound may be pursued for further evaluation.   Mild bilateral pleural effusions.    Aging determinate compression fracture at T5 vertebra.        10/13/20 : CT Abdomen and Pelvis w/ IV Cont (10.13.20 @ 18:50) Diffuse dilatation of small bowel loops without a clear transition is slightly increased, likely an ileus.  Decreased moderate ascites.    1.5 cm pancreatic versus peripancreatic soft tissue nodule    10/13/20 : Xray Chest 1 View- PORTABLE-Urgent (Xray Chest 1 View- PORTABLE-Urgent .) (10.13.20 @ 16:34) Clear lungs.          MICROBIOLOGY DATA:    Culture - Sputum . (10.26.20 @ 00:26)   - Ampicillin/Sulbactam: R <=8/4   - Cefazolin: R >16   - Ceftazidime: I 16   - Clindamycin: R >4   - Erythromycin: R >4   - Gentamicin: S <=1 Should not be used as monotherapy   - Levofloxacin: S <=0.5   - Linezolid: S 2   - Oxacillin: R >2   - Penicillin: R >8   - RIF- Rifampin: S <=1 Should not be used as monotherapy   - Tetra/Doxy: S <=1   - Trimethoprim/Sulfamethoxazole: S <=0.5/9.5   - Trimethoprim/Sulfamethoxazole: S <=0.5/9.5   - Vancomycin: S 1       MRSA/MSSA PCR (10.21.20 @ 09:41)   MRSA PCR Result.: Detected:       Culture - Blood (10.20.20 @ 22:09)   Specimen Source: .Blood Blood   Culture Results:  No growth to date.     Culture - Blood (10.20.20 @ 22:09)   Specimen Source: .Blood Blood   Culture Results:   No growth to date.     Culture - Blood in AM (10.16.20 @ 10:13)   Specimen Source: .Blood Blood-Peripheral   Culture Results: No growth to date.     Culture - Blood in AM (10.16.20 @ 10:13)   Specimen Source: .Blood Blood-Peripheral   Culture Results: No growth to date.     Culture - Blood (10.13.20 @ 22:23)   Growth in anaerobic bottle: Gram Negative Rods   Specimen Source: .Blood Blood-Peripheral   Organism: Blood Culture PCR   Culture Results: Growth in anaerobic bottle: Bacteroides fragilis   "Susceptibilities not performed"     Culture - Blood (10.13.20 @ 22:23)   Specimen Source: .Blood Blood-Peripheral   Culture Results:   No growth to date.                    Patient is seen and examined at the bed side, is afebrile.  She has transferred out of ICU. The kidney function continues to improve.        REVIEW OF SYSTEMS: All other review systems are negative        ALLERGIES: No Known Allergies        ICU Vital Signs Last 24 Hrs  T(C): 36.8 (12 Nov 2020 15:44), Max: 37.3 (12 Nov 2020 06:00)  T(F): 98.3 (12 Nov 2020 15:44), Max: 99.2 (12 Nov 2020 06:00)  HR: 75 (12 Nov 2020 16:00) (72 - 85)  BP: 110/65 (12 Nov 2020 16:00) (96/58 - 113/65)  BP(mean): 74 (12 Nov 2020 16:00) (67 - 76)  ABP: --  ABP(mean): --  RR: 23 (12 Nov 2020 16:00) (23 - 37)  SpO2: 100% (12 Nov 2020 16:00) (96% - 100%)        PHYSICAL EXAM:  GENERAL: Not in distress, on oxygen via NC  CHEST/LUNG: Not using accessory muscles   HEART: s1 and s2 present  ABDOMEN:  Nontender and  Nondistended  EXTREMITIES: B/L UE edematous  CNS: Awake and Alert         LABS:                        6.9    9.27  )-----------( 141      ( 12 Nov 2020 06:34 )             20.8                           7.3    8.62  )-----------( 122      ( 11 Nov 2020 06:01 )             21.5       11-12    143  |  105  |  29<H>  ----------------------------<  61<L>  3.0<L>   |  28  |  1.65<H>    Ca    8.2<L>      12 Nov 2020 06:34  Phos  3.3     11-12  Mg     2.2     11-12    TPro  5.2<L>  /  Alb  2.3<L>  /  TBili  5.3<H>  /  DBili  x   /  AST  147<H>  /  ALT  67<H>  /  AlkPhos  89  11-12    11-11    141  |  103  |  31<H>  ----------------------------<  72  3.6   |  29  |  1.76<H>    Ca    8.3<L>      11 Nov 2020 13:33  Phos  2.9     11-11  Mg     2.3     11-11    TPro  5.3<L>  /  Alb  2.6<L>  /  TBili  5.6<H>  /  DBili  x   /  AST  159<H>  /  ALT  72<H>  /  AlkPhos  87  11-11         11-06    138  |  104  |  37<H>  ----------------------------<  81  3.9   |  25  |  2.37<H>    Ca    8.1<L>      06 Nov 2020 06:20  Phos  3.4     11-06  Mg     2.0     11-06    TPro  5.1<L>  /  Alb  2.8<L>  /  TBili  9.2<H>  /  DBili  x   /  AST  233<H>  /  ALT  99<H>  /  AlkPhos  96  11-06      Vancomycin Level, Trough (11.07.20 @ 21:49)   Vancomycin Level, Trough: 16.1:     Vancomycin Level, Trough (11.07.20 @ 07:08)   Vancomycin Level, Trough: 19.5    vanVancomycin Level, Trough (11.06.20 @ 06:20)   Vancomycin Level, Trough: 19.9:     Vancomycin Level, Trough (11.04.20 @ 06:12)   Vancomycin Level, Trough: 31.0:     Vancomycin Level, Trough (11.03.20 @ 04:11)   Vancomycin Level, Trough: 16.1:      Procalcitonin, Serum (10.15.20 @ 11:48)   Procalcitonin, Serum: 0.16: Procalcitonin (PCT) Interpretation (ng/mL) - Diagnosis of systemic   bacterial infection/sepsis         MEDICATIONS  (STANDING):    furosemide   Injectable 80 milliGRAM(s) IV Push every 12 hours  heparin   Injectable 5000 Unit(s) SubCutaneous every 12 hours  lactulose Syrup 10 Gram(s) Oral daily  midodrine. 10 milliGRAM(s) Oral three times a day  pantoprazole   Suspension 40 milliGRAM(s) Enteral Tube daily  rifAXIMin 550 milliGRAM(s) Oral two times a day      RADIOLOGY & ADDITIONAL TESTS:    11/4/20 : Xray Chest 1 View- PORTABLE-Routine (Xray Chest 1 View- PORTABLE-Routine in AM.) (11.04.20 @ 10:59) Reexpansion of the left lung with residual left pleural effusion and/or infiltrate.  Right pulmonary edema unchanged.  Tubes and catheters in satisfactory position.      10/25/20: Xray Chest 1 View- PORTABLE-Routine (Xray Chest 1 View- PORTABLE-Routine in AM.) (10.25.20 @ 09:21) Frontal expiratory view of the chest shows the heart to be similar in size. Endotracheal tube, right jugular line and feeding tube remain present.    The lungs show similar left upper lobe infiltrate with progression of right perihilar infiltrate. Pleural effusions are similar. There is no evidence of pneumothorax.      10/23/20 : Xray Chest 1 View-PORTABLE IMMEDIATE (Xray Chest 1 View-PORTABLE IMMEDIATE .) (10.23.20 @ 19:09) Feeding tube tip near GE junction as noted. Questionable right upper lobe infiltrate. Follow-up study is recommended as clinically warranted.      10/21/20 : CT Abdomen and Pelvis w/ Oral Cont and w/ IV Cont (10.21.20 @ 15:59) Mural thickening of the left colon and rectum. Liquid stool in the colon. Apparent segmental mural thickening of the mid to distal small bowel and the proximal duodenum. Findings may represent nonspecific enterocolitis, noninfectious inflammatory bowel disease, or ischemic bowel. Clinical correlation is recommended. The celiac axis artery, SMA, and KINA are patent without stenosis.    Dilatation of the mid small bowel is again noted. Oral contrast has reached the terminal ileum. Findings may represent small bowel obstruction, or ileus related to nonspecific enterocolitis.   Cholelithiasis.    Moderate to large ascites in the abdomen, increased since the previous examination. 5 mm nonspecific noncalcified left upper lobe lung nodule; if the patient's is in the high risk category (i.e. smoker), follow-up chest CT may be pursued in 12 months to ensure stability.    Combination of atelectasis and consolidation in the left lower lobe.    Possible 1.0 cm hypodense lesion in the left lobe of the thyroid. Thyroid ultrasound may be pursued for further evaluation.   Mild bilateral pleural effusions.    Aging determinate compression fracture at T5 vertebra.        10/13/20 : CT Abdomen and Pelvis w/ IV Cont (10.13.20 @ 18:50) Diffuse dilatation of small bowel loops without a clear transition is slightly increased, likely an ileus.  Decreased moderate ascites.    1.5 cm pancreatic versus peripancreatic soft tissue nodule    10/13/20 : Xray Chest 1 View- PORTABLE-Urgent (Xray Chest 1 View- PORTABLE-Urgent .) (10.13.20 @ 16:34) Clear lungs.          MICROBIOLOGY DATA:    Culture - Sputum . (10.26.20 @ 00:26)   - Ampicillin/Sulbactam: R <=8/4   - Cefazolin: R >16   - Ceftazidime: I 16   - Clindamycin: R >4   - Erythromycin: R >4   - Gentamicin: S <=1 Should not be used as monotherapy   - Levofloxacin: S <=0.5   - Linezolid: S 2   - Oxacillin: R >2   - Penicillin: R >8   - RIF- Rifampin: S <=1 Should not be used as monotherapy   - Tetra/Doxy: S <=1   - Trimethoprim/Sulfamethoxazole: S <=0.5/9.5   - Trimethoprim/Sulfamethoxazole: S <=0.5/9.5   - Vancomycin: S 1       MRSA/MSSA PCR (10.21.20 @ 09:41)   MRSA PCR Result.: Detected:       Culture - Blood (10.20.20 @ 22:09)   Specimen Source: .Blood Blood   Culture Results:  No growth to date.     Culture - Blood (10.20.20 @ 22:09)   Specimen Source: .Blood Blood   Culture Results:   No growth to date.     Culture - Blood in AM (10.16.20 @ 10:13)   Specimen Source: .Blood Blood-Peripheral   Culture Results: No growth to date.     Culture - Blood in AM (10.16.20 @ 10:13)   Specimen Source: .Blood Blood-Peripheral   Culture Results: No growth to date.     Culture - Blood (10.13.20 @ 22:23)   Growth in anaerobic bottle: Gram Negative Rods   Specimen Source: .Blood Blood-Peripheral   Organism: Blood Culture PCR   Culture Results: Growth in anaerobic bottle: Bacteroides fragilis   "Susceptibilities not performed"     Culture - Blood (10.13.20 @ 22:23)   Specimen Source: .Blood Blood-Peripheral   Culture Results:   No growth to date.

## 2020-11-12 NOTE — PROGRESS NOTE ADULT - ASSESSMENT
65y Female from home, lives with daughter, ambulates with walker with PMH of recurrent SBO's, s/p exp lap, SB resection in 2015, ex lap, ALBINA in 2018, DVT, PE, on Xarelto, IVC filter, chronic leg swelling, and anasarca, came in to the ED due to hypotension during office visit. Patient was found to be bacteremic with bacteroids fragilis. Admitted in ICU due to hypotension and hypothermia. On vancomycin and levo . BCx X2 negative. Sputum positive for MRSA and Stenotrophomonas.     Diagnosis:  >Encephalopathy  >Sepsis  >Bacteremia  >Anemia  >DVT/PE  >Transaminitis  >Aspiration pneumonia    --------------------------------------------Neuro--------------------------------------  -She is AAOx2 at baseline on evaluation, pt awake , more alert , started to communicate   -Encephalopathic and got intubated on 10/24, extubated on 11/9  -Ammonia trending down from 152 > 130 > 116 >103>131>126>90>77----> less than 10  -On lactulose 30g daily and rifaximin, goal BM 2-3 , Nurse held it for decubitus pressure ulcer  -INR>1.63>1.71>1.91>1.98> 1.44  -Pt went into [facility] in setting of septic shock, Liver function and kidney function improved   -CTH held since AMS was most likely due to high ammonia level, hepatic encephalopathy and pt didn't have any focal neurological deficit when she started having AMS  - Patient off Precedex        -------------------------------------CVS-------------------------------  -Patient was hypotensive secondary to septic shock  versus cardiogenic shock, was on  levophed,  currently off vasopressor on Midodrine 10mg TID   -Midodrine 10 mg TID  -will remove RIJ after OR    -will c/w agreesive diuresis with Lasix 80mg BID for Ansarca .  UO and ascites improved   -previous ECHO 3-4 months back showed normal EF but new ECHO showed EF 15-20% with severe left ventricular dysfunction  -pt with multiorgan dysfunction sec to septic shock versus cardiogenic shock with improvement    - PANCa , C ANCa negative  -Anticardiolipin abs +ve      --------------------------------Respiratory----------------------  - Passed SBT and extubated on 11/9  - CXR: persistent  bilateral pleural effusion with underlying airspace consolidation   - on second day of ICU admission , CT showed mild b/l pleural effusion and left lower lobe consolidation likley 2/2 aspiration pneumonia  - sputum culture grew MRSA and Stenotrophomonas (sensitive to levo)  - Patient completed a course of vancomycin and Levaquin   --repeat sputum culture neg  - last dose of antibiotics 11/10  -ID Dr. Patricio    ----------------------------------------Gastrointestinal--------------------------------------  - NGT removed on 11/10  - NPO for Procedure   -Patient was bacteremic with bacteroids fragilis, likely secondary from intra abdominal source.   -Patient had multiple SBO in past. 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  -ammonia trends down from 150 to 130 >116>103>131>126>90> 77,--<10 on lactulose (with parameters 2-3 BM/day) and rifaximin   -Will monitor BM, titrate lactulose to 2-3 BM qd   -GI, DR Chatterjee deferred doing colonoscopy due to multiple comorbidities, FOBT positive on 10/15  -Dr Katlyn aguilar has been noted,    -----------------------------------------ID---------------------------------------  -Patient was bacteremic with bacteroids likely GI source.  -Lactate on admission 1.3 but trended up to 2.7 and later 3.7, was not trended since the reason was likley liver dysfunction   -Procalcitonin 0.79  - sputum culture grew MRSA and Stenotrophomonas (sensitive to levo)  - Patient completed a course of vancomycin and Levaquin   --repeat sputum culture neg  - last dose of antibiotics 11/10  -ID Dr. Patricio    ---------------------------------------------Nephro--------s-----------------------------  - hypernatremia improved   - Creatinine trended down  , will avoid nephrotoxin  -Monitor UO,   - on Lasix 80 BID   - UO and anasarca improved    ----------------------------------------Hem Onc----------------------------------  -Has h/o DVT and PE in past. Patient is on Xarelto at home  -Patient has anemia of chronic disease  -Hgb 6.8>1PRBC>8.2> 7.9>7.5>7.4>7.6> 7.3  -plt trended up to 122, no active bleeding  - will start on prophylactic dose Ac Heparin 5000 q12 hrs per dr sullivan recommendation   -Folate and B12 normal.   -Dr. Miguel aguilar following, s/p thiamine 3 doses  - INR improved to 1.09     ----------------------------------------Endo--------------------------------  -HgbA1c 4  -Fs q6h since patient is NPO for OR  - FS on lower side in the morning   - will start on IVF RL+D5 50cc/h while NPO     ----------------------------------------Prophylaxis----------------------------  DVT: heparin sq 5000 q12 hrs  GI: PPI    ---------------------------------------GOC---------------------------  -DNR   -Palliative is on board           65y Female from home, lives with daughter, ambulates with walker with PMH of recurrent SBO's, s/p exp lap, SB resection in 2015, ex lap, ALBINA in 2018, DVT, PE, on Xarelto, IVC filter, chronic leg swelling, and anasarca, came in to the ED due to hypotension during office visit. Patient was found to be bacteremic with bacteroids fragilis. Admitted in ICU due to hypotension and hypothermia. On vancomycin and levo . BCx X2 negative. Sputum positive for MRSA and Stenotrophomonas.     Diagnosis:  >Encephalopathy  >Sepsis  >Bacteremia  >Anemia  >DVT/PE  >Transaminitis  >Aspiration pneumonia    --------------------------------------------Neuro--------------------------------------  -She is AAOx2 at baseline on evaluation, pt awake , more alert , started to communicate and talk today , appetite improved   -Encephalopathic and got intubated on 10/24, extubated on 11/9  -Ammonia trending down from 152 > 130 > 116 >103>131>126>90>77----> less than 10  -On lactulose 30g PRN and rifaximin qd ,  goal BM 2-3 qd+ , Nurse held it for decubitus pressure ulcer  -INR>1.63>1.71>1.91>1.98> 1.44  -Pt went into MOF in setting of septic shock, Liver function and kidney function improved and getting close to baseline   -CTH held since AMS was most likely due to high ammonia level, hepatic encephalopathy and pt didn't have any focal neurological deficit when she started having AMS  - Patient off Sedatives         -------------------------------------CVS-------------------------------  -Patient was hypotensive secondary to septic shock  versus cardiogenic shock, was on  levophed,  currently off vasopressor on Midodrine 10mg TID   -Midodrine 10 mg TID  - RIJ removed over night    -will c/w aggressive diuresis with Lasix 80mg BID for Ansarca for now, and will consider tapering down to 40mg BID tomorrow as patient is having a unit of PRBC toda.  UO and ascites improved   -previous ECHO 3-4 months back showed normal EF but new ECHO showed EF 15-20% with severe left ventricular dysfunction  -pt with multiorgan dysfunction sec to septic shock versus cardiogenic shock with improvement    - PANCa , C ANCa negative  -Anticardiolipin abs +ve      --------------------------------Respiratory----------------------  - Passed SBT and extubated on 11/9  - CXR: persistent  bilateral pleural effusion with underlying airspace consolidation   - on second day of ICU admission , CT showed mild b/l pleural effusion and left lower lobe consolidation likley 2/2 aspiration pneumonia  - sputum culture grew MRSA and Stenotrophomonas (sensitive to levo)  - Patient completed a course of vancomycin and Levaquin   --repeat sputum culture neg  - last dose of antibiotics 11/10  -ID Dr. Patricio    ----------------------------------------Gastrointestinal--------------------------------------  - NGT removed on 11/10  - patient on puree and thick nectar liquid   -Patient was bacteremic with bacteroids fragilis, likely secondary from intra abdominal source.   -Patient had multiple SBO in past. 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  -ammonia trends down from 150 to 130 >116>103>131>126>90> 77,--<10 on lactulose (with parameters 2-3 BM/day) and rifaximin   -Will monitor BM, titrate lactulose to 2-3 BM qd   -GI, DR Chatterjee deferred doing colonoscopy due to multiple comorbidities, FOBT positive on 10/15  -Dr Katlyn aguilar has been noted,    -----------------------------------------ID---------------------------------------  -Patient was bacteremic with bacteroids likely GI source.  -Lactate on admission 1.3 but trended up to 2.7 and later 3.7, was not trended since the reason was likley liver dysfunction   -Procalcitonin 0.79  - sputum culture grew MRSA and Stenotrophomonas (sensitive to levo)  - Patient completed a course of vancomycin and Levaquin   --repeat sputum culture neg  - last dose of antibiotics 11/10  -ID Dr. Patricio    ---------------------------------------------Nephro--------s-----------------------------  - hypernatremia improved   - Creatinine trended down  , will avoid nephrotoxin  -Monitor UO,   - on Lasix 80 BID   - UO and anasarca improved    ----------------------------------------Hem Onc----------------------------------  -Has h/o DVT and PE in past. Patient is on Xarelto at home  -Patient has anemia of chronic disease  -Hgb 6.8>1PRBC>8.2> 7.9>7.5>7.4>7.6> 7.3> 6.9 sp OR   -will give PRBC and will follow CBC sp transfusion   -plt trended up to 122, no active bleeding, will follow platelet   - on ophylactic dose Ac Heparin 5000 q12 hrs   -Folate and B12 normal.   -Dr. Miguel aguilar following, s/p thiamine 3 doses  - INR improved to 1.09     ----------------------------------------Endo--------------------------------  -HgbA1c 4  -Fs before meals and at bedtime   - FS on lower side in the morning , will monitor FS   - ICU -180       ----------------------------------------Prophylaxis----------------------------  DVT: heparin sq 5000 q12 hrs  GI: PPI    ---------------------------------------GOC---------------------------  -DNR   -Palliative is on board

## 2020-11-12 NOTE — PROGRESS NOTE ADULT - SUBJECTIVE AND OBJECTIVE BOX
alert,   no fever or sob  BP stable    ROS:  Negative except for:    MEDICATIONS  (STANDING):  furosemide   Injectable 80 milliGRAM(s) IV Push every 12 hours  heparin   Injectable 5000 Unit(s) SubCutaneous every 12 hours  lactulose Syrup 10 Gram(s) Oral daily  midodrine. 10 milliGRAM(s) Oral three times a day  pantoprazole  Injectable 40 milliGRAM(s) IV Push two times a day  rifAXIMin 550 milliGRAM(s) Oral two times a day    MEDICATIONS  (PRN):      Allergies    No Known Allergies    Intolerances        Vital Signs Last 24 Hrs  T(C): 37.3 (12 Nov 2020 06:00), Max: 37.3 (12 Nov 2020 06:00)  T(F): 99.2 (12 Nov 2020 06:00), Max: 99.2 (12 Nov 2020 06:00)  HR: 78 (12 Nov 2020 06:00) (72 - 85)  BP: 104/60 (12 Nov 2020 06:00) (92/55 - 106/62)  BP(mean): 71 (12 Nov 2020 06:00) (62 - 74)  RR: 33 (12 Nov 2020 06:00) (25 - 38)  SpO2: 100% (12 Nov 2020 06:00) (92% - 100%)    PHYSICAL EXAM  General: adult in NAD  HEENT: clear oropharynx, anicteric sclera, pink conjunctiva  Neck: supple  CV: normal S1/S2 with no murmur rubs or gallops  Lungs: positive air movement b/l ant lungs,clear to auscultation, no wheezes, no rales  Abdomen: soft non-tender non-distended, no hepatosplenomegaly  Ext: no clubbing cyanosis or edema  Skin: no rashes and no petechiae  Neuro: alert and oriented X 4, no focal deficits  LABS:                          6.9    x     )-----------( x        ( 12 Nov 2020 06:34 )             20.8         Mean Cell Volume : 90.0 fl  Mean Cell Hemoglobin : 30.5 pg  Mean Cell Hemoglobin Concentration : 34.0 gm/dL  Auto Neutrophil # : x  Auto Lymphocyte # : x  Auto Monocyte # : x  Auto Eosinophil # : x  Auto Basophil # : x  Auto Neutrophil % : x  Auto Lymphocyte % : x  Auto Monocyte % : x  Auto Eosinophil % : x  Auto Basophil % : x    Serial CBC  Hematocrit 20.8  Hemoglobin 6.9  Plat --  RBC --  WBC --  Serial CBC  Hematocrit 21.5  Hemoglobin 7.3  Plat 122  RBC 2.39  WBC 8.62  Serial CBC  Hematocrit 23.1  Hemoglobin 7.9  Plat 126  RBC 2.61  WBC 8.40  Serial CBC  Hematocrit 24.8  Hemoglobin 8.8  Plat 137  RBC 2.83  WBC 7.43    11-12    143  |  105  |  29<H>  ----------------------------<  61<L>  3.0<L>   |  28  |  1.65<H>    Ca    8.2<L>      12 Nov 2020 06:34  Phos  3.3     11-12  Mg     2.2     11-12    TPro  5.2<L>  /  Alb  2.3<L>  /  TBili  5.3<H>  /  DBili  x   /  AST  147<H>  /  ALT  67<H>  /  AlkPhos  89  11-12      PT/INR - ( 11 Nov 2020 06:01 )   PT: 12.9 sec;   INR: 1.09 ratio         PTT - ( 11 Nov 2020 06:01 )  PTT:36.9 sec              BLOOD SMEAR INTERPRETATION:       RADIOLOGY & ADDITIONAL STUDIES:

## 2020-11-12 NOTE — PROGRESS NOTE ADULT - ASSESSMENT
64 y/o Female s/p sacral decubitus ulcer debridement 11/11    -Daily dressing changes   -Frequent repositioning   -Care as per ICU   -Reconsult as needed

## 2020-11-12 NOTE — CHART NOTE - NSCHARTNOTEFT_GEN_A_CORE
65y Female from home, lives with daughter, ambulates with walker with PMH of recurrent SBO's, s/p exp lap, SB resection in 2015, ex lap, ALBINA in 2018, DVT, PE, on Xarelto, IVC filter, chronic leg swelling, and anasarca, came in to the ED due to hypotension during office visit. Patient was found to be bacteremic with bacteroids fragilis. Admitted in ICU due to hypotension and hypothermia. patient completed course of antibiotics . BCx X2 negative. Ammonia level elevated and patient refused NGT placement, mental status deteriorated and patient desaturated to high 70%, patient got intubated on 10/24 . Sputum was positive for MRSA and Stenotrophomonas. patient started on vancomycin and Levaquin. patient was given lactulose for high ammonia level. kidney function and liver function started to deteriorate and patient was leaning toward multi organ failure . high dose of Lasix started and patient was aggressively diuresed. Kidney function improved . Ammonia level dropped to less than 10.  Mental status improved and patient extubated on 11/3/2020. Patient was seen by wound specialist and decubitus ulcer debrided. patient had a drop in h/h s/p debridement . received 1 unit PRBC .     Patient  downgraded to AI. NP on floor, ------informed and hospitalist attending Dr Ross informed.      things to follow:     1- CBC post transfusion   2- Decrease Lasix dose to 40 mg BID from 11/13  3- f/u BMP and electrolytes and prelate as needed   4- strict I&O monitor   5- Morning labs  6- Monitor FS and keep -180                Diagnosis:  >Encephalopathy  >Sepsis  >Bacteremia  >Anemia  >DVT/PE  >Transaminitis  >Aspiration pneumonia    --------------------------------------------Neuro--------------------------------------  -She is AAOx2 at baseline on evaluation, pt awake , more alert , started to communicate and talk today , appetite improved   -Encephalopathic and got intubated on 10/24, extubated on 11/9  -Ammonia trending down from 152 > 130 > 116 >103>131>126>90>77----> less than 10  -On lactulose 30g PRN and rifaximin qd ,  goal BM 2-3 qd+ , Nurse held it for decubitus pressure ulcer  -INR>1.63>1.71>1.91>1.98> 1.44  -Pt went into MOF in setting of septic shock, Liver function and kidney function improved and getting close to baseline   -CTH held since AMS was most likely due to high ammonia level, hepatic encephalopathy and pt didn't have any focal neurological deficit when she started having AMS  - Patient off Sedatives         -------------------------------------CVS-------------------------------  -Patient was hypotensive secondary to septic shock  versus cardiogenic shock, was on  levophed,  currently off vasopressor on Midodrine 10mg TID   -Midodrine 10 mg TID  - RIJ removed over night    -will c/w aggressive diuresis with Lasix 80mg BID for Ansarca for now, and will consider tapering down to 40mg BID tomorrow as patient is having a unit of PRBC toda.  UO and ascites improved   -previous ECHO 3-4 months back showed normal EF but new ECHO showed EF 15-20% with severe left ventricular dysfunction  -pt with multiorgan dysfunction sec to septic shock versus cardiogenic shock with improvement    - PANCa , C ANCa negative  -Anticardiolipin abs +ve      --------------------------------Respiratory----------------------  - Passed SBT and extubated on 11/9  - CXR: persistent  bilateral pleural effusion with underlying airspace consolidation   - on second day of ICU admission , CT showed mild b/l pleural effusion and left lower lobe consolidation likley 2/2 aspiration pneumonia  - sputum culture grew MRSA and Stenotrophomonas (sensitive to levo)  - Patient completed a course of vancomycin and Levaquin   --repeat sputum culture neg  - last dose of antibiotics 11/10  -ID Dr. Patricio    ----------------------------------------Gastrointestinal--------------------------------------  - NGT removed on 11/10  - patient on puree and thick nectar liquid   -Patient was bacteremic with bacteroids fragilis, likely secondary from intra abdominal source.   -Patient had multiple SBO in past. 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  -ammonia trends down from 150 to 130 >116>103>131>126>90> 77,--<10 on lactulose (with parameters 2-3 BM/day) and rifaximin   -Will monitor BM, titrate lactulose to 2-3 BM qd   -GI, DR Chatterjee deferred doing colonoscopy due to multiple comorbidities, FOBT positive on 10/15  -Dr Katlyn aguilar has been noted,    -----------------------------------------ID---------------------------------------  -Patient was bacteremic with bacteroids likely GI source.  -Lactate on admission 1.3 but trended up to 2.7 and later 3.7, was not trended since the reason was likley liver dysfunction   -Procalcitonin 0.79  - sputum culture grew MRSA and Stenotrophomonas (sensitive to levo)  - Patient completed a course of vancomycin and Levaquin   --repeat sputum culture neg  - last dose of antibiotics 11/10  -ID Dr. Patricio    ---------------------------------------------Nephro--------s-----------------------------  - hypernatremia improved   - Creatinine trended down  , will avoid nephrotoxin  -Monitor UO,   - on Lasix 80 BID   - UO and anasarca improved    ----------------------------------------Hem Onc----------------------------------  -Has h/o DVT and PE in past. Patient is on Xarelto at home  -Patient has anemia of chronic disease  -Hgb 6.8>1PRBC>8.2> 7.9>7.5>7.4>7.6> 7.3> 6.9 sp OR   -will give PRBC and will follow CBC sp transfusion   -plt trended up to 122, no active bleeding, will follow platelet   - on ophylactic dose Ac Heparin 5000 q12 hrs   -Folate and B12 normal.   -Dr. Miguel aguilar following, s/p thiamine 3 doses  - INR improved to 1.09     ----------------------------------------Endo--------------------------------  -HgbA1c 4  -Fs before meals and at bedtime   - FS on lower side in the morning , will monitor FS   - ICU -180       ----------------------------------------Prophylaxis----------------------------  DVT: heparin sq 5000 q12 hrs  GI: PPI    ---------------------------------------GOC---------------------------  -DNR   -Palliative is on board 65y Female from home, lives with daughter, ambulates with walker with PMH of recurrent SBO's, s/p exp lap, SB resection in 2015, ex lap, ALBINA in 2018, DVT, PE, on Xarelto, IVC filter, chronic leg swelling, and anasarca, came in to the ED due to hypotension during office visit. Patient was found to be bacteremic with bacteroids fragilis. Admitted in ICU due to hypotension and hypothermia. patient completed course of antibiotics . BCx X2 negative. Ammonia level elevated and patient refused NGT placement, mental status deteriorated and patient desaturated to high 70%, patient got intubated on 10/24 . Sputum was positive for MRSA and Stenotrophomonas. patient started on vancomycin and Levaquin. patient was given lactulose for high ammonia level. kidney function and liver function started to deteriorate and patient was leaning toward multi organ failure . high dose of Lasix started and patient was aggressively diuresed. Kidney function improved . Ammonia level dropped to less than 10.  Mental status improved and patient extubated on 11/3/2020. Patient was seen by wound specialist and decubitus ulcer debrided. patient had a drop in h/h s/p debridement . received 1 unit PRBC .     Patient  downgraded to AI. NP KALEE on floor informed and hospitalist attending Dr Ross informed.      things to follow:     1- CBC post transfusion   2- Decrease Lasix dose to 40 mg BID from 11/13  3- f/u BMP and electrolytes and prelate as needed   4- strict I&O monitor   5- Morning labs  6- Monitor FS and keep -18  7- f/u palliative                 Diagnosis:  >Encephalopathy  >Sepsis  >Bacteremia  >Anemia  >DVT/PE  >Transaminitis  >Aspiration pneumonia    --------------------------------------------Neuro--------------------------------------  -She is AAOx2 at baseline on evaluation, pt awake , more alert , started to communicate and talk today , appetite improved   -Encephalopathic and got intubated on 10/24, extubated on 11/9  -Ammonia trending down from 152 > 130 > 116 >103>131>126>90>77----> less than 10  -On lactulose 30g PRN and rifaximin qd ,  goal BM 2-3 qd+ , Nurse held it for decubitus pressure ulcer  -INR>1.63>1.71>1.91>1.98> 1.44  -Pt went into MOF in setting of septic shock, Liver function and kidney function improved and getting close to baseline   -CTH held since AMS was most likely due to high ammonia level, hepatic encephalopathy and pt didn't have any focal neurological deficit when she started having AMS  - Patient off Sedatives         -------------------------------------CVS-------------------------------  -Patient was hypotensive secondary to septic shock  versus cardiogenic shock, was on  levophed,  currently off vasopressor on Midodrine 10mg TID   -Midodrine 10 mg TID  - RIJ removed over night    -will c/w aggressive diuresis with Lasix 80mg BID for Ansarca for now, and will consider tapering down to 40mg BID tomorrow as patient is having a unit of PRBC toda.  UO and ascites improved   -previous ECHO 3-4 months back showed normal EF but new ECHO showed EF 15-20% with severe left ventricular dysfunction  -pt with multiorgan dysfunction sec to septic shock versus cardiogenic shock with improvement    - PANCa , C ANCa negative  -Anticardiolipin abs +ve      --------------------------------Respiratory----------------------  - Passed SBT and extubated on 11/9  - CXR: persistent  bilateral pleural effusion with underlying airspace consolidation   - on second day of ICU admission , CT showed mild b/l pleural effusion and left lower lobe consolidation likley 2/2 aspiration pneumonia  - sputum culture grew MRSA and Stenotrophomonas (sensitive to levo)  - Patient completed a course of vancomycin and Levaquin   --repeat sputum culture neg  - last dose of antibiotics 11/10  -ID Dr. Patricio    ----------------------------------------Gastrointestinal--------------------------------------  - NGT removed on 11/10  - patient on puree and thick nectar liquid   -Patient was bacteremic with bacteroids fragilis, likely secondary from intra abdominal source.   -Patient had multiple SBO in past. 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  -ammonia trends down from 150 to 130 >116>103>131>126>90> 77,--<10 on lactulose (with parameters 2-3 BM/day) and rifaximin   -Will monitor BM, titrate lactulose to 2-3 BM qd   -GI, DR Chatterjee deferred doing colonoscopy due to multiple comorbidities, FOBT positive on 10/15  -Dr Potts recs has been noted,    -----------------------------------------ID---------------------------------------  -Patient was bacteremic with bacteroids likely GI source.  -Lactate on admission 1.3 but trended up to 2.7 and later 3.7, was not trended since the reason was likley liver dysfunction   -Procalcitonin 0.79  - sputum culture grew MRSA and Stenotrophomonas (sensitive to levo)  - Patient completed a course of vancomycin and Levaquin   --repeat sputum culture neg  - last dose of antibiotics 11/10  -ID Dr. Patricio    ---------------------------------------------Nephro--------s-----------------------------  - hypernatremia improved   - Creatinine trended down  , will avoid nephrotoxin  -Monitor UO,   - on Lasix 80 BID   - UO and anasarca improved    ----------------------------------------Hem Onc----------------------------------  -Has h/o DVT and PE in past. Patient is on Xarelto at home  -Patient has anemia of chronic disease  -Hgb 6.8>1PRBC>8.2> 7.9>7.5>7.4>7.6> 7.3> 6.9 sp OR   -will give PRBC and will follow CBC sp transfusion   -plt trended up to 122, no active bleeding, will follow platelet   - on ophylactic dose Ac Heparin 5000 q12 hrs   -Folate and B12 normal.   -Dr. Miguel aguilar following, s/p thiamine 3 doses  - INR improved to 1.09     ----------------------------------------Endo--------------------------------  -HgbA1c 4  -Fs before meals and at bedtime   - FS on lower side in the morning , will monitor FS   - ICU -180       ----------------------------------------Prophylaxis----------------------------  DVT: heparin sq 5000 q12 hrs  GI: PPI    ---------------------------------------GOC---------------------------  -DNR   -Palliative is on board

## 2020-11-12 NOTE — PROGRESS NOTE ADULT - SUBJECTIVE AND OBJECTIVE BOX
INTERVAL HPI/OVERNIGHT EVENTS: ***    PRESSORS: [ ] YES [ ] NO  WHICH:    ANTIBIOTICS:                  DATE STARTED:  ANTIBIOTICS:                  DATE STARTED:  ANTIBIOTICS:                  DATE STARTED:    Antimicrobial:  rifAXIMin 550 milliGRAM(s) Oral two times a day    Cardiovascular:  furosemide   Injectable 80 milliGRAM(s) IV Push every 12 hours  midodrine. 10 milliGRAM(s) Oral three times a day    Pulmonary:    Hematalogic:  heparin   Injectable 5000 Unit(s) SubCutaneous every 12 hours    Other:  lactulose Syrup 10 Gram(s) Oral daily  pantoprazole  Injectable 40 milliGRAM(s) IV Push two times a day    furosemide   Injectable 80 milliGRAM(s) IV Push every 12 hours  heparin   Injectable 5000 Unit(s) SubCutaneous every 12 hours  lactulose Syrup 10 Gram(s) Oral daily  midodrine. 10 milliGRAM(s) Oral three times a day  pantoprazole  Injectable 40 milliGRAM(s) IV Push two times a day  rifAXIMin 550 milliGRAM(s) Oral two times a day    Drug Dosing Weight  Height (cm): 167.6 (21 Oct 2020 02:26)  Weight (kg): 56.2 (21 Oct 2020 02:26)  BMI (kg/m2): 20 (21 Oct 2020 02:26)  BSA (m2): 1.63 (21 Oct 2020 02:26)    CENTRAL LINE: [ ] YES [ ] NO  LOCATION:         TREJO: [ ] YES [ ] NO          A-LINE:  [ ] YES [ ] NO  LOCATION:             ICU Vital Signs Last 24 Hrs  T(C): 37.3 (12 Nov 2020 06:00), Max: 37.3 (12 Nov 2020 06:00)  T(F): 99.2 (12 Nov 2020 06:00), Max: 99.2 (12 Nov 2020 06:00)  HR: 78 (12 Nov 2020 06:00) (72 - 85)  BP: 104/60 (12 Nov 2020 06:00) (92/55 - 106/62)  BP(mean): 71 (12 Nov 2020 06:00) (62 - 74)  ABP: --  ABP(mean): --  RR: 33 (12 Nov 2020 06:00) (25 - 38)  SpO2: 100% (12 Nov 2020 06:00) (92% - 100%)            11-11 @ 07:01  -  11-12 @ 07:00  --------------------------------------------------------  IN: 970 mL / OUT: 2060 mL / NET: -1090 mL            REVIEW OF SYSTEMS:    CONSTITUTIONAL: No weakness, fevers or chills  NECK: No pain or stiffness  RESPIRATORY: No cough, wheezing, hemoptysis; No shortness of breath  CARDIOVASCULAR: No chest pain or palpitations  GASTROINTESTINAL: No abdominal or epigastric pain. No nausea, vomiting, No diarrhea or constipation. No melena or hematochezia.  GENITOURINARY: No dysuria, frequency or hematuria  NEUROLOGICAL: No numbness or weakness  All other review of systems is negative unless indicated above      PHYSICAL EXAM:    GENERAL: NAD, well-groomed, well-developed  EYES: EOMI, PERRLA,   NECK: Supple, No JVD; Normal thyroid; Trachea midline  NERVOUS SYSTEM:  Alert & Oriented X3,  Motor Strength 5/5 B/L upper and lower extremities; DTRs 2+ intact and symmetric  CHEST/LUNG: No rales, rhonchi, wheezing   HEART: Regular rate and rhythm; No murmurs,   ABDOMEN: Soft, Nontender, Nondistended; Bowel sounds present  EXTREMITIES:  2+ Peripheral Pulses, No clubbing, cyanosis, or edema        LABS:  CBC Full  -  ( 12 Nov 2020 06:34 )  WBC Count : x  RBC Count : x  Hemoglobin : 6.9 g/dL  Hematocrit : 20.8 %  Platelet Count - Automated : x  Mean Cell Volume : x  Mean Cell Hemoglobin : x  Mean Cell Hemoglobin Concentration : x  Auto Neutrophil # : x  Auto Lymphocyte # : x  Auto Monocyte # : x  Auto Eosinophil # : x  Auto Basophil # : x  Auto Neutrophil % : x  Auto Lymphocyte % : x  Auto Monocyte % : x  Auto Eosinophil % : x  Auto Basophil % : x    11-12    143  |  105  |  x   ----------------------------<  x   3.0<L>   |  x   |  x     Ca    8.3<L>      11 Nov 2020 13:33  Phos  2.9     11-11  Mg     2.3     11-11    TPro  5.3<L>  /  Alb  2.6<L>  /  TBili  5.6<H>  /  DBili  x   /  AST  159<H>  /  ALT  72<H>  /  AlkPhos  87  11-11    PT/INR - ( 11 Nov 2020 06:01 )   PT: 12.9 sec;   INR: 1.09 ratio         PTT - ( 11 Nov 2020 06:01 )  PTT:36.9 sec        RADIOLOGY & ADDITIONAL STUDIES REVIEWED:  ***    [ ]GOALS OF CARE DISCUSSION WITH PATIENT/FAMILY/PROXY:    CRITICAL CARE TIME SPENT: 35 minutes INTERVAL HPI/OVERNIGHT EVENTS:    s/p OR for decubitus ulcer debridement    mental status improving   Hb dropped to 6.9 , will give a unit of PRBC   central line removed over night   family decided to discuss GOC with patient when MS improved   U/O 70cc/hr Net is negative         PRESSORS: [ ] YES [ ] NO  WHICH:    ANTIBIOTICS:                  DATE STARTED:  ANTIBIOTICS:                  DATE STARTED:  ANTIBIOTICS:                  DATE STARTED:    Antimicrobial:  rifAXIMin 550 milliGRAM(s) Oral two times a day    Cardiovascular:  furosemide   Injectable 80 milliGRAM(s) IV Push every 12 hours  midodrine. 10 milliGRAM(s) Oral three times a day    Pulmonary:    Hematalogic:  heparin   Injectable 5000 Unit(s) SubCutaneous every 12 hours    Other:  lactulose Syrup 10 Gram(s) Oral daily  pantoprazole  Injectable 40 milliGRAM(s) IV Push two times a day    furosemide   Injectable 80 milliGRAM(s) IV Push every 12 hours  heparin   Injectable 5000 Unit(s) SubCutaneous every 12 hours  lactulose Syrup 10 Gram(s) Oral daily  midodrine. 10 milliGRAM(s) Oral three times a day  pantoprazole  Injectable 40 milliGRAM(s) IV Push two times a day  rifAXIMin 550 milliGRAM(s) Oral two times a day    Drug Dosing Weight  Height (cm): 167.6 (21 Oct 2020 02:26)  Weight (kg): 56.2 (21 Oct 2020 02:26)  BMI (kg/m2): 20 (21 Oct 2020 02:26)  BSA (m2): 1.63 (21 Oct 2020 02:26)    CENTRAL LINE: [ ] YES [ ] NO  LOCATION:         TREJO: [ ] YES [ ] NO          A-LINE:  [ ] YES [ ] NO  LOCATION:             ICU Vital Signs Last 24 Hrs  T(C): 37.3 (12 Nov 2020 06:00), Max: 37.3 (12 Nov 2020 06:00)  T(F): 99.2 (12 Nov 2020 06:00), Max: 99.2 (12 Nov 2020 06:00)  HR: 78 (12 Nov 2020 06:00) (72 - 85)  BP: 104/60 (12 Nov 2020 06:00) (92/55 - 106/62)  BP(mean): 71 (12 Nov 2020 06:00) (62 - 74)  ABP: --  ABP(mean): --  RR: 33 (12 Nov 2020 06:00) (25 - 38)  SpO2: 100% (12 Nov 2020 06:00) (92% - 100%)            11-11 @ 07:01  -  11-12 @ 07:00  --------------------------------------------------------  IN: 970 mL / OUT: 2060 mL / NET: -1090 mL          GENERAL: extubated , not sedated , anasarca improved, patent started to talking, appetite improved   HEAD:  Atraumatic, Normocephalic  EYES: b/l pupil reactive to light.   NECK: Supple, normal appearance, No JVD; Normal thyroid; Trachea midline, CVC in [facility] 10/20  NERVOUS SYSTEM:  follows commands.  CHEST/LUNG: rales on basis   HEART: Regular rate and rhythm; No murmurs, rubs, or gallops  ABDOMEN: Soft, Nontender, mildly distended; Bowel sounds present  EXTREMITIES:  1+ Peripheral Pulses, Pitting edema +2 (improved)  LYMPH: No lymphadenopathy noted  SKIN: Decubitus ulcer           LABS:  CBC Full  -  ( 12 Nov 2020 06:34 )  WBC Count : x  RBC Count : x  Hemoglobin : 6.9 g/dL  Hematocrit : 20.8 %  Platelet Count - Automated : x  Mean Cell Volume : x  Mean Cell Hemoglobin : x  Mean Cell Hemoglobin Concentration : x  Auto Neutrophil # : x  Auto Lymphocyte # : x  Auto Monocyte # : x  Auto Eosinophil # : x  Auto Basophil # : x  Auto Neutrophil % : x  Auto Lymphocyte % : x  Auto Monocyte % : x  Auto Eosinophil % : x  Auto Basophil % : x    11-12    143  |  105  |  x   ----------------------------<  x   3.0<L>   |  x   |  x     Ca    8.3<L>      11 Nov 2020 13:33  Phos  2.9     11-11  Mg     2.3     11-11    TPro  5.3<L>  /  Alb  2.6<L>  /  TBili  5.6<H>  /  DBili  x   /  AST  159<H>  /  ALT  72<H>  /  AlkPhos  87  11-11    PT/INR - ( 11 Nov 2020 06:01 )   PT: 12.9 sec;   INR: 1.09 ratio         PTT - ( 11 Nov 2020 06:01 )  PTT:36.9 sec        RADIOLOGY & ADDITIONAL STUDIES REVIEWED:  ***    [ ]GOALS OF CARE DISCUSSION WITH PATIENT/FAMILY/PROXY:    CRITICAL CARE TIME SPENT: 35 minutes

## 2020-11-12 NOTE — PROGRESS NOTE ADULT - ASSESSMENT
as per icu     65y Female from home, lives with daughter, ambulates with walker with PMH of recurrent SBO's, s/p exp lap, SB resection in 2015, ex lap, ALBINA in 2018, DVT, PE, on Xarelto, IVC filter, chronic leg swelling, and anasarca, came in to the ED due to hypotension during office visit. Patient was found to be bacteremic with bacteroids fragilis. Admitted in ICU due to hypotension and hypothermia. On vancomycin and levo . BCx X2 negative. Sputum positive for MRSA and Stenotrophomonas.     Diagnosis:  >Encephalopathy  >Sepsis  >Bacteremia  >Anemia  >DVT/PE  >Transaminitis  >Aspiration pneumonia    --------------------------------------------Neuro--------------------------------------  -She is AAOx2 at baseline on evaluation, pt awake , more alert , started to communicate   -Encephalopathic and got intubated on 10/24, extubated on 11/9  -Ammonia trending down from 152 > 130 > 116 >103>131>126>90>77----> less than 10  -On lactulose 30g daily and rifaximin, goal BM 2-3 , Nurse held it for decubitus pressure ulcer  -INR>1.63>1.71>1.91>1.98> 1.44  -Pt went into [facility] in setting of septic shock, Liver function and kidney function improved   -CTH held since AMS was most likely due to high ammonia level, hepatic encephalopathy and pt didn't have any focal neurological deficit when she started having AMS  - Patient off Precedex        -------------------------------------CVS-------------------------------  -Patient was hypotensive secondary to septic shock  versus cardiogenic shock, was on  levophed,  currently off vasopressor on Midodrine 10mg TID   -Midodrine 10 mg TID  -will remove RIJ after OR    -will c/w agreesive diuresis with Lasix 80mg BID for Ansarca .  UO and ascites improved   -previous ECHO 3-4 months back showed normal EF but new ECHO showed EF 15-20% with severe left ventricular dysfunction  -pt with multiorgan dysfunction sec to septic shock versus cardiogenic shock with improvement    - PANCa , C ANCa negative  -Anticardiolipin abs +ve      --------------------------------Respiratory----------------------  - Passed SBT and extubated on 11/9  - CXR: persistent  bilateral pleural effusion with underlying airspace consolidation   - on second day of ICU admission , CT showed mild b/l pleural effusion and left lower lobe consolidation likley 2/2 aspiration pneumonia  - sputum culture grew MRSA and Stenotrophomonas (sensitive to levo)  - Patient completed a course of vancomycin and Levaquin   --repeat sputum culture neg  - last dose of antibiotics 11/10  -ID Dr. Patricio    ----------------------------------------Gastrointestinal--------------------------------------  - NGT removed on 11/10  - NPO for Procedure   -Patient was bacteremic with bacteroids fragilis, likely secondary from intra abdominal source.   -Patient had multiple SBO in past. Ct abdomen on admission showed ileus, Repeat CT A/P showed ileus and non occlusive ischemic colitis  -No signs of acute abdomen   -Surgery recs > No intervention, pt was admitted in Jan diagnosed with budd Chiari syndrome with portal HTN recs to transfer to [facility] but pt is not stable to transfer  -Pt was in multi organ dysfunction secondary to septic shock, kidney and liver function improved  -repeat BCx X 2 negative.  -ammonia trends down from 150 to 130 >116>103>131>126>90> 77,--<10 on lactulose (with parameters 2-3 BM/day) and rifaximin   -Will monitor BM, titrate lactulose to 2-3 BM qd   -GI, DR Chatterjee deferred doing colonoscopy due to multiple comorbidities, FOBT positive on 10/15  -Dr Katlyn aguilar has been noted,    -----------------------------------------ID---------------------------------------  -Patient was bacteremic with bacteroids likely GI source.  -Lactate on admission 1.3 but trended up to 2.7 and later 3.7, was not trended since the reason was likley liver dysfunction   -Procalcitonin 0.79  - sputum culture grew MRSA and Stenotrophomonas (sensitive to levo)  - Patient completed a course of vancomycin and Levaquin   --repeat sputum culture neg  - last dose of antibiotics 11/10  -ID Dr. Patricio    ---------------------------------------------Nephro--------s-----------------------------  - hypernatremia improved   - Creatinine trended down  , will avoid nephrotoxin  -Monitor UO,   - on Lasix 80 BID   - UO and anasarca improved    ----------------------------------------Hem Onc----------------------------------  -Has h/o DVT and PE in past. Patient is on Xarelto at home  -Patient has anemia of chronic disease  -Hgb 6.8>1PRBC>8.2> 7.9>7.5>7.4>7.6> 7.3  -plt trended up to 122, no active bleeding  - will start on prophylactic dose Ac Heparin 5000 q12 hrs per dr rivera recommendation   -Folate and B12 normal.   -Dr. Rivera recs following, s/p thiamine 3 doses  - INR improved to 1.09     ----------------------------------------Endo--------------------------------  -HgbA1c 4  -Fs q6h since patient is NPO for OR  - FS on lower side in the morning   - will start on IVF RL+D5 50cc/h while NPO     ----------------------------------------Prophylaxis----------------------------  DVT: heparin sq 5000 q12 hrs  GI: PPI    ---------------------------------------GOC---------------------------  -DNR   -Palliative is on board

## 2020-11-13 NOTE — PROGRESS NOTE ADULT - SUBJECTIVE AND OBJECTIVE BOX
Patient is seen and examined at the bed side, is afebrile.  She is doing much better. The kidney function continues to improve.        REVIEW OF SYSTEMS: All other review systems are negative        ALLERGIES: No Known Allergies        Vital Signs Last 24 Hrs  T(C): 36.3 (13 Nov 2020 14:25), Max: 36.4 (12 Nov 2020 17:46)  T(F): 97.3 (13 Nov 2020 14:25), Max: 97.6 (12 Nov 2020 20:12)  HR: 66 (13 Nov 2020 14:25) (63 - 77)  BP: 110/59 (13 Nov 2020 14:25) (105/62 - 122/65)  BP(mean): 72 (12 Nov 2020 17:00) (72 - 72)  RR: 18 (13 Nov 2020 14:25) (16 - 28)  SpO2: 100% (13 Nov 2020 14:25) (99% - 100%)      PHYSICAL EXAM:  GENERAL: Not in distress, on oxygen via NC  CHEST/LUNG: Not using accessory muscles   HEART: s1 and s2 present  ABDOMEN:  Nontender and  Nondistended  EXTREMITIES: B/L UE edematous  CNS: Awake and Alert         LABS:                        9.0    10.54 )-----------( 137      ( 13 Nov 2020 06:56 )             27.1                           6.9    9.27  )-----------( 141      ( 12 Nov 2020 06:34 )             20.8       11-13    145  |  108  |  30<H>  ----------------------------<  76  3.2<L>   |  30  |  1.53<H>    Ca    8.2<L>      13 Nov 2020 06:56  Phos  3.7     11-13  Mg     2.0     11-13    TPro  5.2<L>  /  Alb  2.3<L>  /  TBili  5.3<H>  /  DBili  x   /  AST  147<H>  /  ALT  67<H>  /  AlkPhos  89  11-12    11-12    143  |  105  |  29<H>  ----------------------------<  61<L>  3.0<L>   |  28  |  1.65<H>    Ca    8.2<L>      12 Nov 2020 06:34  Phos  3.3     11-12  Mg     2.2     11-12    TPro  5.2<L>  /  Alb  2.3<L>  /  TBili  5.3<H>  /  DBili  x   /  AST  147<H>  /  ALT  67<H>  /  AlkPhos  89  11-12         11-06    138  |  104  |  37<H>  ----------------------------<  81  3.9   |  25  |  2.37<H>    Ca    8.1<L>      06 Nov 2020 06:20  Phos  3.4     11-06  Mg     2.0     11-06    TPro  5.1<L>  /  Alb  2.8<L>  /  TBili  9.2<H>  /  DBili  x   /  AST  233<H>  /  ALT  99<H>  /  AlkPhos  96  11-06      Vancomycin Level, Trough (11.07.20 @ 21:49)   Vancomycin Level, Trough: 16.1:     Vancomycin Level, Trough (11.07.20 @ 07:08)   Vancomycin Level, Trough: 19.5    vanVancomycin Level, Trough (11.06.20 @ 06:20)   Vancomycin Level, Trough: 19.9:     Vancomycin Level, Trough (11.04.20 @ 06:12)   Vancomycin Level, Trough: 31.0:     Vancomycin Level, Trough (11.03.20 @ 04:11)   Vancomycin Level, Trough: 16.1:          MEDICATIONS  (STANDING):    dextrose 5% + sodium chloride 0.45% with potassium chloride 10 mEq/L 100 milliLiter(s) (50 mL/Hr) IV Continuous <Continuous>  furosemide   Injectable 80 milliGRAM(s) IV Push every 12 hours  heparin   Injectable 5000 Unit(s) SubCutaneous every 12 hours  lactulose Syrup 10 Gram(s) Oral daily  midodrine. 10 milliGRAM(s) Oral three times a day  pantoprazole   Suspension 40 milliGRAM(s) Enteral Tube daily  rifAXIMin 550 milliGRAM(s) Oral two times a day      RADIOLOGY & ADDITIONAL TESTS:    11/4/20 : Xray Chest 1 View- PORTABLE-Routine (Xray Chest 1 View- PORTABLE-Routine in AM.) (11.04.20 @ 10:59) Reexpansion of the left lung with residual left pleural effusion and/or infiltrate.  Right pulmonary edema unchanged.  Tubes and catheters in satisfactory position.      10/25/20: Xray Chest 1 View- PORTABLE-Routine (Xray Chest 1 View- PORTABLE-Routine in AM.) (10.25.20 @ 09:21) Frontal expiratory view of the chest shows the heart to be similar in size. Endotracheal tube, right jugular line and feeding tube remain present.    The lungs show similar left upper lobe infiltrate with progression of right perihilar infiltrate. Pleural effusions are similar. There is no evidence of pneumothorax.      10/23/20 : Xray Chest 1 View-PORTABLE IMMEDIATE (Xray Chest 1 View-PORTABLE IMMEDIATE .) (10.23.20 @ 19:09) Feeding tube tip near GE junction as noted. Questionable right upper lobe infiltrate. Follow-up study is recommended as clinically warranted.      10/21/20 : CT Abdomen and Pelvis w/ Oral Cont and w/ IV Cont (10.21.20 @ 15:59) Mural thickening of the left colon and rectum. Liquid stool in the colon. Apparent segmental mural thickening of the mid to distal small bowel and the proximal duodenum. Findings may represent nonspecific enterocolitis, noninfectious inflammatory bowel disease, or ischemic bowel. Clinical correlation is recommended. The celiac axis artery, SMA, and KINA are patent without stenosis.    Dilatation of the mid small bowel is again noted. Oral contrast has reached the terminal ileum. Findings may represent small bowel obstruction, or ileus related to nonspecific enterocolitis.   Cholelithiasis.    Moderate to large ascites in the abdomen, increased since the previous examination. 5 mm nonspecific noncalcified left upper lobe lung nodule; if the patient's is in the high risk category (i.e. smoker), follow-up chest CT may be pursued in 12 months to ensure stability.    Combination of atelectasis and consolidation in the left lower lobe.    Possible 1.0 cm hypodense lesion in the left lobe of the thyroid. Thyroid ultrasound may be pursued for further evaluation.   Mild bilateral pleural effusions.    Aging determinate compression fracture at T5 vertebra.        10/13/20 : CT Abdomen and Pelvis w/ IV Cont (10.13.20 @ 18:50) Diffuse dilatation of small bowel loops without a clear transition is slightly increased, likely an ileus.  Decreased moderate ascites.    1.5 cm pancreatic versus peripancreatic soft tissue nodule    10/13/20 : Xray Chest 1 View- PORTABLE-Urgent (Xray Chest 1 View- PORTABLE-Urgent .) (10.13.20 @ 16:34) Clear lungs.          MICROBIOLOGY DATA:    Culture - Sputum . (10.26.20 @ 00:26)   - Ampicillin/Sulbactam: R <=8/4   - Cefazolin: R >16   - Ceftazidime: I 16   - Clindamycin: R >4   - Erythromycin: R >4   - Gentamicin: S <=1 Should not be used as monotherapy   - Levofloxacin: S <=0.5   - Linezolid: S 2   - Oxacillin: R >2   - Penicillin: R >8   - RIF- Rifampin: S <=1 Should not be used as monotherapy   - Tetra/Doxy: S <=1   - Trimethoprim/Sulfamethoxazole: S <=0.5/9.5   - Trimethoprim/Sulfamethoxazole: S <=0.5/9.5   - Vancomycin: S 1       MRSA/MSSA PCR (10.21.20 @ 09:41)   MRSA PCR Result.: Detected:       Culture - Blood (10.20.20 @ 22:09)   Specimen Source: .Blood Blood   Culture Results:  No growth to date.     Culture - Blood (10.20.20 @ 22:09)   Specimen Source: .Blood Blood   Culture Results:   No growth to date.     Culture - Blood in AM (10.16.20 @ 10:13)   Specimen Source: .Blood Blood-Peripheral   Culture Results: No growth to date.     Culture - Blood in AM (10.16.20 @ 10:13)   Specimen Source: .Blood Blood-Peripheral   Culture Results: No growth to date.     Culture - Blood (10.13.20 @ 22:23)   Growth in anaerobic bottle: Gram Negative Rods   Specimen Source: .Blood Blood-Peripheral   Organism: Blood Culture PCR   Culture Results: Growth in anaerobic bottle: Bacteroides fragilis   "Susceptibilities not performed"     Culture - Blood (10.13.20 @ 22:23)   Specimen Source: .Blood Blood-Peripheral   Culture Results:   No growth to date.

## 2020-11-13 NOTE — PROGRESS NOTE ADULT - NUTRITIONAL ASSESSMENT
This patient has been assessed with a concern for Malnutrition and has been determined to have a diagnosis/diagnoses of Severe protein-calorie malnutrition.    This patient is being managed with:   Diet Dysphagia 1 Pureed-Nectar Consistency Fluid-  Supplement Feeding Modality:  Oral  Two Papi HN Cans or Servings Per Day:  3       Frequency:  Three Times a day  Ensure Pudding Cans or Servings Per Day:  2       Frequency:  Two Times a day  Entered: Nov 13 2020  1:47PM

## 2020-11-13 NOTE — PROGRESS NOTE ADULT - PROBLEM SELECTOR PLAN 10
Continue heparin for DVT prophylaxis. Positive HX of DVT and PE. Has IVC filter.  Nutrition consult  Continue pressure support.

## 2020-11-13 NOTE — PROGRESS NOTE ADULT - NUTRITIONAL ASSESSMENT
This patient has been assessed with a concern for Malnutrition and has been determined to have a diagnosis/diagnoses of Severe protein-calorie malnutrition.  Protein 5.2   Albumin  2.3  Ensure Enlive 3 cans per day  Ensure pudding twice daily    This patient is being managed with:   Diet Dysphagia 1 Pureed-Nectar Consistency Fluid-  Entered: Nov 11 2020  6:39PM This patient has been assessed with a concern for Malnutrition and has been determined to have a diagnosis/diagnoses of Severe protein-calorie malnutrition.  Protein 5.2   Albumin  2.3  Ensure  3 cans per day  Ensure pudding twice daily  2CalHN  TID  This patient is being managed with:   Diet Dysphagia 1 Pureed-Nectar Consistency Fluid-  Entered: Nov 11 2020  6:39PM

## 2020-11-13 NOTE — PROGRESS NOTE ADULT - PROBLEM SELECTOR PLAN 7
Encourage oral intake.  Protein 5.2  albumin  2.3  2CalHF added TID. Ensure Pudding BID  Needs assistance with eating.

## 2020-11-13 NOTE — PROGRESS NOTE ADULT - PROBLEM SELECTOR PLAN 9
Unstageable sacral wound. S/P surgical debridement.  Continue wound care as ordered  Turn and position every 2 hours

## 2020-11-13 NOTE — PROGRESS NOTE ADULT - ASSESSMENT
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  BC grew bacteroides.    Problem/Recommendation - 1:  Problem: Bacteremia. Recommendation: bacteroides  most likely GI origin  on antibiotics  she had hypothermia and hypotension and elevated lactic acid  in ICU now, she is extubated today, bp is more stable  multiorgan failure but improving  christel is trending down now, cholestasis, etiology?  Cr continue to trend down slowly,  may be due to poor perfusion  Problem/Recommendation - 2:·  Problem: Anemia.  Recommendation: ferritin, b12 folate normalno hemolysis  most likley due to malnutrition from short bowel syndrome.she also had GIB multiple times before. Problem/Recommendation - 3:·  Problem: Acute deep vein thrombosis (DVT) of other vein of upper extremity.  Recommendation: she has been on xarelto for 2 years.  DVT at left arm and left leg before.  in all CT scan i did not see report of IVC or hepatic vein thrombosisoff xarelto and lovenox due to elevated PT/PTT. elevated PT/PTT due to malnutrition and antibiotics causing VITK def  now it is mostly recovered  it begins to rise again, correctable by mixing study  probably due to liver failure this time  she develops DVT very quickly while off ACneed DVT prophylaxis, sq heparin  5. thrombocytopenia  new onset, most likely from sepsis or medication, procal is rising  the other possibilties causing multiorgan failure, including thrombocytopenia,  such as autoimmune, vasculitis, HLH, catastrophic APS(less likely)  platelet is trending up, 150 today  6. CHF, new onset global hypokinesia,   cardiomyopathy, ?etiology

## 2020-11-13 NOTE — PROGRESS NOTE ADULT - SUBJECTIVE AND OBJECTIVE BOX
alert, feel ok  very weak  no sob  bp stable    ROS:  Negative except for:    MEDICATIONS  (STANDING):  dextrose 5% + sodium chloride 0.45% with potassium chloride 10 mEq/L 100 milliLiter(s) (50 mL/Hr) IV Continuous <Continuous>  furosemide   Injectable 80 milliGRAM(s) IV Push every 12 hours  heparin   Injectable 5000 Unit(s) SubCutaneous every 12 hours  lactulose Syrup 10 Gram(s) Oral daily  midodrine. 10 milliGRAM(s) Oral three times a day  pantoprazole   Suspension 40 milliGRAM(s) Enteral Tube daily  rifAXIMin 550 milliGRAM(s) Oral two times a day    MEDICATIONS  (PRN):      Allergies    No Known Allergies    Intolerances        Vital Signs Last 24 Hrs  T(C): 36.3 (13 Nov 2020 14:25), Max: 36.4 (12 Nov 2020 20:12)  T(F): 97.3 (13 Nov 2020 14:25), Max: 97.6 (12 Nov 2020 20:12)  HR: 74 (13 Nov 2020 17:39) (63 - 74)  BP: 116/65 (13 Nov 2020 17:39) (106/59 - 122/65)  BP(mean): --  RR: 18 (13 Nov 2020 14:25) (16 - 20)  SpO2: 100% (13 Nov 2020 14:25) (100% - 100%)    PHYSICAL EXAM  General: adult in NAD  HEENT: clear oropharynx, anicteric sclera, pink conjunctiva  Neck: supple  CV: normal S1/S2 with no murmur rubs or gallops  Lungs: positive air movement b/l ant lungs,clear to auscultation, no wheezes, no rales  Abdomen: soft non-tender non-distended, no hepatosplenomegaly  Ext: no clubbing cyanosis or edema  Skin: no rashes and no petechiae  Neuro: alert and oriented X 4, no focal deficits  LABS:                          9.0    10.54 )-----------( 137      ( 13 Nov 2020 06:56 )             27.1         Mean Cell Volume : 89.4 fl  Mean Cell Hemoglobin : 29.7 pg  Mean Cell Hemoglobin Concentration : 33.2 gm/dL  Auto Neutrophil # : x  Auto Lymphocyte # : x  Auto Monocyte # : x  Auto Eosinophil # : x  Auto Basophil # : x  Auto Neutrophil % : x  Auto Lymphocyte % : x  Auto Monocyte % : x  Auto Eosinophil % : x  Auto Basophil % : x    Serial CBC  Hematocrit 27.1  Hemoglobin 9.0  Plat 137  RBC 3.03  WBC 10.54  Serial CBC  Hematocrit 27.1  Hemoglobin 9.0  Plat 137  RBC 3.05  WBC 11.69  Serial CBC  Hematocrit 20.8  Hemoglobin 6.9  Plat 141  RBC 2.28  WBC 9.27  Serial CBC  Hematocrit 21.5  Hemoglobin 7.3  Plat 122  RBC 2.39  WBC 8.62  Serial CBC  Hematocrit 23.1  Hemoglobin 7.9  Plat 126  RBC 2.61  WBC 8.40    11-13    145  |  108  |  30<H>  ----------------------------<  76  3.2<L>   |  30  |  1.53<H>    Ca    8.2<L>      13 Nov 2020 06:56  Phos  3.7     11-13  Mg     2.0     11-13    TPro  5.2<L>  /  Alb  2.3<L>  /  TBili  5.3<H>  /  DBili  x   /  AST  147<H>  /  ALT  67<H>  /  AlkPhos  89  11-12                    BLOOD SMEAR INTERPRETATION:       RADIOLOGY & ADDITIONAL STUDIES:

## 2020-11-13 NOTE — PROGRESS NOTE ADULT - ASSESSMENT
Patient is a 65y old  Female from home, lives with daughter, ambulates with walker with PMH of recurrent SBO's, s/p exp lap, SB resection in 2015, ex lap, ALBINA in 2018, DVT, PE, on Xarelto, IVC filter, chronic leg swelling, and anasarca, Now send in to the ER by her PCP, Dr. Ross, for evaluation of  generalized weakness and hypotension BP of 80/40 during office visit. On admission, she found to have no fever and BP was in lower normal range and no Leukocytosis. The CXR is clear and CT abd/pelvis consistent with Ileus. The ID consult requested to assist with evaluation of infectious etiology of episode of hypotension. Found to have Bacteroides bacteremia and now developed septic shock on Cefepime and Flagyl. Hence transferred to ICU. 10/20/20.    # Hypotension  at office- BP of 80/40 - resolved   #  Bacteroides fragilis Bacteremia - 10/13/20 - ? source most likely GI- Repeat Blood Cxs have NGTD 10/16/20  # Ileus  - h/o recurrent SBO and s/p EXp. LAp  # COVID 19 negative   # Septic shock ( Hypothermia + hypotensive)- transferred to ICU since requiring pressor- source GI, The CT abd/pelvis shows nonspecific enterocolitis, noninfectious inflammatory bowel disease, or ischemic bowel, along with ileus.   # Pneumonia- HCAP vs Aspiration - on CXR 10/24 and CT chest 10/21- sputum Cx grew Methicillin resistant Staphylococcus aureus  and Stenotrophomonas maltophilia.  # S/p extubated 11/9/20      would recommend:    1. OOB to chair   2. Monitor OFF Abx  3. Monitor kidney function, is improving    4. Aspiration precaution  5. Continue Sacral Wound care  and Frequent Repositioning       Attending Attestation:    Spent more than 45 minutes on total encounter, more than 50 % of the visit was spent counseling and/or coordinating care by the Attending physician.

## 2020-11-13 NOTE — PROGRESS NOTE ADULT - SUBJECTIVE AND OBJECTIVE BOX
MARIBETH PADILLA    SCU progress note    INTERVAL HPI/OVERNIGHT EVENTS: ***Transferred from ICU to SCU late yesterday.  HPI:65y Female from home, lives with daughter, ambulates with walker with PMH of recurrent SBO's, s/p exp lap, SB resection in 2015, ex lap, ALBINA in 2018, DVT, PE, on Xarelto, IVC filter, chronic leg swelling, and anasarca, came in to the ED due to hypotension during office visit. Patient was found to be bacteremic with bacteroids fragilis. Admitted in ICU due to hypotension and hypothermia. patient completed course of antibiotics . BCx X2 negative. Ammonia level elevated and patient refused NGT placement, mental status deteriorated and patient desaturated to high 70%, patient got intubated on 10/24 . Sputum was positive for MRSA and Stenotrophomonas. patient started on vancomycin and Levaquin. patient was given lactulose for high ammonia level. kidney function and liver function started to deteriorate and patient was leaning toward multi organ failure . high dose of Lasix started and patient was aggressively diuresed. Kidney function improved . Ammonia level dropped to less than 10.  Mental status improved and patient extubated on 11/3/2020. Patient was seen by wound specialist and decubitus ulcer debrided. patient had a drop in h/h s/p debridement . received 1 unit PRBC .       DNR [x ]   DNI  [  ]   TRIAL OF INTUBATION    Covid - 19 PCR: Negative 10/19    The 4Ms    What Matters Most: see GOC  Age appropriate Medications/Screen for High Risk Medication: Yes  Mentation: see CAM below  Mobility: defer to physical exam    The Confusion Assessment Method (CAM) Diagnostic Algorithm     1: Acute Onset or Fluctuating Course  - Is there evidence of an acute change in mental status from the patient’s baseline? Did the (abnormal) behavior  fluctuate during the day, that is, tend to come and go, or increase and decrease in severity?  [ ] YES [x ] NO     2: Inattention  - Did the patient have difficulty focusing attention, being easily distractible, or having difficulty keeping track of what was being said?  [ ] YES [x ] NO     3: Disorganized thinking  -Was the patient’s thinking disorganized or incoherent, such as rambling or irrelevant conversation, unclear or illogical flow of ideas, or unpredictable switching from subject to subject?  [ ] YES [x ] NO    4: Altered Level of consciousness?  [ ] YES [x ] NO    The diagnosis of delirium by CAM requires the presence of features 1 and 2 and either 3 or 4.    PRESSORS: [ ] YES [x ] NO  rifAXIMin 550 milliGRAM(s) Oral two times a day    Cardiovascular:  Heart Failure  Acute   Acute on Chronic  Chronic       furosemide   Injectable 80 milliGRAM(s) IV Push every 12 hours  midodrine. 10 milliGRAM(s) Oral three times a day    Pulmonary:    Hematalogic:  heparin   Injectable 5000 Unit(s) SubCutaneous every 12 hours    Other:  lactulose Syrup 10 Gram(s) Oral daily  pantoprazole   Suspension 40 milliGRAM(s) Enteral Tube daily  potassium chloride  10 mEq/100 mL IVPB 10 milliEquivalent(s) IV Intermittent once    furosemide   Injectable 80 milliGRAM(s) IV Push every 12 hours  heparin   Injectable 5000 Unit(s) SubCutaneous every 12 hours  lactulose Syrup 10 Gram(s) Oral daily  midodrine. 10 milliGRAM(s) Oral three times a day  pantoprazole   Suspension 40 milliGRAM(s) Enteral Tube daily  potassium chloride  10 mEq/100 mL IVPB 10 milliEquivalent(s) IV Intermittent once  rifAXIMin 550 milliGRAM(s) Oral two times a day    Drug Dosing Weight  Height (cm): 167.6 (21 Oct 2020 02:26)  Weight (kg): 56.2 (21 Oct 2020 02:26)  BMI (kg/m2): 20 (21 Oct 2020 02:26)  BSA (m2): 1.63 (21 Oct 2020 02:26)    CENTRAL LINE: [ ] YES [x ] NO  LOCATION:   DATE INSERTED:  REMOVE: [ ] YES [ ] NO  EXPLAIN:    TREJO: [x ] YES [ ] NO    DATE INSERTED:  REMOVE:  [ ] YES [x ] NO  EXPLAIN:   Critically ill    PAST MEDICAL & SURGICAL HISTORY:  Anasarca    Pulmonary embolism    DVT (deep venous thrombosis)    SBO (small bowel obstruction)    H/O exploratory laparotomy                11-12 @ 07:01  -  11-13 @ 07:00  --------------------------------------------------------  IN: 1270 mL / OUT: 1975 mL / NET: -705 mL            PHYSICAL EXAM:    GENERAL: NAD, cachectic  HEAD:  Atraumatic, Normocephalic  EYES: EOMI, PERRLA, conjunctiva and sclera clear  ENMT: No tonsillar erythema, exudates  NECK: Supple, No JVD  NERVOUS SYSTEM:  Alert & Oriented X3, Good concentration; Motor Strength 5/5 B/L upper and lower extremities; DTRs 2+ intact and symmetric  CHEST/LUNG: Diminished breath sounds bilaterally  HEART: Regular rate and rhythm; No murmurs, rubs, or gallops  ABDOMEN: Soft, Nontender, Nondistended; Bowel sounds present  EXTREMITIES:  +1 edema lower extremities 2+ Peripheral Pulses, No clubbing, cyanosis  LYMPH: No lymphadenopathy noted  SKIN: Skin tears Right posterior shoulder, right inner thigh. Unstageable sacrum. +pink granulation as well as dark tissue +tunneling noted. Edges of wound pink      LABS:  CBC Full  -  ( 13 Nov 2020 06:56 )  WBC Count : 10.54 K/uL  RBC Count : 3.03 M/uL  Hemoglobin : 9.0 g/dL  Hematocrit : 27.1 %  Platelet Count - Automated : 137 K/uL  Mean Cell Volume : 89.4 fl  Mean Cell Hemoglobin : 29.7 pg  Mean Cell Hemoglobin Concentration : 33.2 gm/dL  Auto Neutrophil # : x  Auto Lymphocyte # : x  Auto Monocyte # : x  Auto Eosinophil # : x  Auto Basophil # : x  Auto Neutrophil % : x  Auto Lymphocyte % : x  Auto Monocyte % : x  Auto Eosinophil % : x  Auto Basophil % : x    11-13    145  |  108  |  30<H>  ----------------------------<  76  3.2<L>   |  30  |  1.53<H>    Ca    8.2<L>      13 Nov 2020 06:56  Phos  3.7     11-13  Mg     2.0     11-13    TPro  5.2<L>  /  Alb  2.3<L>  /  TBili  5.3<H>  /  DBili  x   /  AST  147<H>  /  ALT  67<H>  /  AlkPhos  89  11-12              [  ]  DVT Prophylaxis  [  ]  Nutrition, Brand, Rate         Goal Rate        Abnormal Nutritional Status -  Malnutrition   Cachexia         RADIOLOGY & ADDITIONAL STUDIES:  ***  c< from: CT Chest w/ IV Cont (10.21.20 @ 16:03) >  *** ADDENDUM 10/21/2020  ***    Upon further review, there is apparent mild luminal narrowing at the distal small bowel, probably due to muralthickening (image 83 and 100 series 2). This may be represent the transition point for the differential consideration of small bowel obstruction. Ileus is still in the differential consideration because oral contrast has reached the terminal ileum. Dilatation of the proximal ascending colon measuring 7.9 cm in diameter.    Upon further review, there is a mildly enlarged 1.2 cm peripancreatic lymph node.    Case discussed with Dr. Dean.    *** END OF ADDENDUM 10/21/2020  ***      PROCEDURE DATE:  10/21/2020          INTERPRETATION:  CT of the chest, abdomen, and pelvis with IV contrast    Indication: Shortness of breath. Clinical concern for bowel obstruction. Sepsis.    Technique: Axial multidetector CT images of the chest, abdomen, and pelvisare acquired following the administration of oral contrast and IV contrast (90 cc Omnipaque-350 administered, 10 cc discarded).    Comparison: No prior chest CT is available for comparison. Abdominal/pelvic CT dated 10/13/2020.    Findings:    Chest:Mild bilateral pleural effusions. No pericardial effusion. The heart is not enlarged. Aortic calcifications are present. No evidence for aortic dissection. Ectasia of the ascending aorta at 3.8 cm in diameter. Retroesophageal right aberrant subclavian artery. Possible 1.0 cm hypodense lesion in the left lobe of the thyroid. Thyroid ultrasound may be pursued for further evaluation. No enlarged mediastinal, hilar, or axillary lymph node.    The trachea and central bronchi are patent.    No evidenceof pneumothorax. Combination of atelectasis and consolidation in the left lower lobe. Mild atelectasis in other lung fields bilaterally. Small calcified granuloma in the lingula. 5 mm nonspecific noncalcified left upper lobe lung nodule (image 59 series 3); if the patient's is in the high risk category (i.e. smoker), follow-up chest CT may be pursued in 12 months to ensure stability.    Abdomen/pelvis: Respiratory motion artifact limits evaluation. Gallstones are again seen in the gallbladder. Evaluation of the gallbladder wall is limited by respiratory motion artifact. The liver, common bile duct, pancreas, spleen, adrenals and left kidney appear unremarkable. Small hypodense lesion in the right kidney, too small to characterize.    The appendix appears unremarkable. Mural thickening of the left colon and rectum. Liquid stool in the colon. Oral contrast has reached the terminal ileum. Apparent segmental mural thickening of the mid to distal small bowel and the proximal duodenum. Dilatation of the mid small bowel is again noted.    No evidence for free air or enlarged lymph node. Moderate to large ascites in the abdomen, increased since the previous examination.    The celiac axis artery, SMA, and KINA are patent without stenosis. IVC filter is again noted.    Trejo catheter in the urinary bladder. The uterus appears unremarkable.    Skeleton: Aging determinate compression fracture at T5 vertebra. Degenerative spondylosis.    Impression:    Mural thickening of the left colon and rectum. Liquid stool in the colon. Apparent segmental mural thickening of the mid to distal small bowel and the proximal duodenum. Findings may represent nonspecific enterocolitis, noninfectious inflammatory bowel disease, or ischemic bowel. Clinical correlation is recommended. The celiac axis artery, SMA, and KINA are patent without stenosis.    Dilatation of the mid small bowel is again noted. Oral contrast has reached the terminal ileum. Findings may represent small bowel obstruction, or ileus related to nonspecific enterocolitis.    Cholelithiasis.    Moderate to large ascites in the abdomen, increased since the previous examination.    5 mm nonspecific noncalcified left upper lobe lung nodule; if the patient's is in the high risk category (i.e. smoker), follow-up chest CT may be pursued in 12 months to ensure stability.    Combination of atelectasis and consolidation in the left lower lobe.    Possible 1.0 cm hypodense lesion in the left lobe of the thyroid. Thyroid ultrasound may be pursued for further evaluation.    Mild bilateral pleural effusions.    Aging determinate compression fracture at T5 vertebra.    < end of copied text >  < from: Xray Chest 1 View- PORTABLE-Routine (Xray Chest 1 View- PORTABLE-Routine in AM.) (11.11.20 @ 08:16) >  The lung apices are partially obscured by the patient's head and chin.    Right central venous catheter is unchanged in position.  Nasogastric tube is been removed.    The evaluation of the cardiomediastinal silhouette is limited on portable technique.    There are persistent bilateral pleural effusions with underlying airspace consolidation.    < end of copied text >  ch< from: Transthoracic Echocardiogram (10.27.20 @ 09:56) >  LVMI: 53 g/m2  RWT: 0.42  Ejection Fraction Visual Estimate: 15-20 %    ------------------------------------------------------------------------  OBSERVATIONS:  Mitral Valve: Normal mitral valve. Trace mitral  regurgitation.  Aortic Root: Normal aortic root.  Aortic Valve: Normal trileaflet aortic valve. Trace aortic  regurgitation.  Left Atrium: LA volume index = 13 cc/m2.  LeftVentricle: Severe global left ventricular systolic  dysfunction. Normal left ventricular internal dimensions  and wall thicknesses.  Right Heart: Normal right atrium. Right ventricular  enlargement with decreased RV systolic function. There is  moderate tricuspid regurgitation. There is trace pulmonic  regurgitation.  Pericardium/PleuraNormal pericardium with no pericardial  effusion. Bilateral pleural effusions.  Hemodynamic: RA Pressure is 8 mm Hg. RV systolic pressure  is 35 mm Hg.  ------------------------------------------------------------------------  CONCLUSIONS:  1. Trace mitral regurgitation.  2.  Trace aortic regurgitation.  3. Normal left ventricular internal dimensions and wall  thicknesses.  4. Severe global left ventricular systolic dysfunction.  5. Right ventricular enlargement with decreased RV systolic  function.  6. Bilateral pleural effusions.    < end of copied text >    Goals of Care Discussion with Family/Proxy/Other   - see note from  10/23

## 2020-11-13 NOTE — PROGRESS NOTE ADULT - PROBLEM SELECTOR PLAN 4
Probably secondary to portal hypertension.   LFTs remain elevated. Follow daily  Continue rifaximin.  lactulose to keep bowel movement 2-3 per day.  Hepatology following

## 2020-11-13 NOTE — PROGRESS NOTE ADULT - SUBJECTIVE AND OBJECTIVE BOX
Patient was seen and examined  Patient is a 65y old  Female who presents with a chief complaint of Hypotension (13 Nov 2020 11:34)      INTERVAL HPI/OVERNIGHT EVENTS:  T(C): 36.2 (11-13-20 @ 05:18), Max: 36.8 (11-12-20 @ 15:44)  HR: 63 (11-13-20 @ 05:18) (63 - 78)  BP: 106/59 (11-13-20 @ 05:18) (105/62 - 122/65)  RR: 16 (11-13-20 @ 05:18) (16 - 30)  SpO2: 100% (11-13-20 @ 05:18) (99% - 100%)  Wt(kg): --  I&O's Summary    12 Nov 2020 07:01  -  13 Nov 2020 07:00  --------------------------------------------------------  IN: 1270 mL / OUT: 1975 mL / NET: -705 mL        LABS:                        9.0    10.54 )-----------( 137      ( 13 Nov 2020 06:56 )             27.1     11-13    145  |  108  |  30<H>  ----------------------------<  76  3.2<L>   |  30  |  1.53<H>    Ca    8.2<L>      13 Nov 2020 06:56  Phos  3.7     11-13  Mg     2.0     11-13    TPro  5.2<L>  /  Alb  2.3<L>  /  TBili  5.3<H>  /  DBili  x   /  AST  147<H>  /  ALT  67<H>  /  AlkPhos  89  11-12        CAPILLARY BLOOD GLUCOSE      POCT Blood Glucose.: 101 mg/dL (13 Nov 2020 11:40)  POCT Blood Glucose.: 82 mg/dL (13 Nov 2020 06:08)  POCT Blood Glucose.: 91 mg/dL (12 Nov 2020 23:52)              MEDICATIONS  (STANDING):  furosemide   Injectable 80 milliGRAM(s) IV Push every 12 hours  heparin   Injectable 5000 Unit(s) SubCutaneous every 12 hours  lactulose Syrup 10 Gram(s) Oral daily  midodrine. 10 milliGRAM(s) Oral three times a day  pantoprazole   Suspension 40 milliGRAM(s) Enteral Tube daily  rifAXIMin 550 milliGRAM(s) Oral two times a day    MEDICATIONS  (PRN):      RADIOLOGY & ADDITIONAL TESTS:    Imaging Personally Reviewed:  [ ] YES  [ ] NO    REVIEW OF SYSTEMS:  CONSTITUTIONAL: No fever, weight loss, or fatigue  EYES: No eye pain, visual disturbances, or discharge  ENMT:  No difficulty hearing, tinnitus, vertigo; No sinus or throat pain  NECK: No pain or stiffness  BREASTS: No pain, masses, or nipple discharge  RESPIRATORY: No cough, wheezing, chills or hemoptysis; No shortness of breath  CARDIOVASCULAR: No chest pain, palpitations, dizziness, or leg swelling  GASTROINTESTINAL: No abdominal or epigastric pain. No nausea, vomiting, or hematemesis; No diarrhea or constipation. No melena or hematochezia.  GENITOURINARY: No dysuria, frequency, hematuria, or incontinence  NEUROLOGICAL: No headaches, memory loss, loss of strength, numbness, or tremors  SKIN: No itching, burning, rashes, or lesions   LYMPH NODES: No enlarged glands  ENDOCRINE: No heat or cold intolerance; No hair loss  MUSCULOSKELETAL: No joint pain or swelling; No muscle, back, or extremity pain  PSYCHIATRIC: No depression, anxiety, mood swings, or difficulty sleeping  HEME/LYMPH: No easy bruising, or bleeding gums  ALLERY AND IMMUNOLOGIC: No hives or eczema      Consultant(s) Notes Reviewed:  [ x ] YES  [ ] NO    PHYSICAL EXAM:  GENERAL: NAD, well-groomed, well-developed  HEAD:  Atraumatic, Normocephalic  EYES: EOMI, PERRLA, conjunctiva and sclera clear  ENMT: No tonsillar erythema, exudates, or enlargement; Moist mucous membranes, Good dentition, No lesions  NECK: Supple, No JVD, Normal thyroid  NERVOUS SYSTEM:  Alert & Oriented X3, Good concentration; Motor Strength 5/5 B/L upper and lower extremities; DTRs 2+ intact and symmetric  CHEST/LUNG: Clear to percussion bilaterally; No rales, rhonchi, wheezing, or rubs  HEART: Regular rate and rhythm; No murmurs, rubs, or gallops  ABDOMEN: Soft, Nontender, Nondistended; Bowel sounds present  EXTREMITIES:  2+ Peripheral Pulses, No clubbing, cyanosis, or edema  LYMPH: No lymphadenopathy noted  SKIN: No rashes or lesions    Care Discussed with Consultants/Other Providers [ x] YES  [ ] NO

## 2020-11-13 NOTE — PROGRESS NOTE ADULT - PROBLEM SELECTOR PLAN 4
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 Follow daily  Continue rifaximin.  lactulose to keep bowel movement 2-3 per day.  Hepatology following Probably secondary to portal hypertension.   LFTs remain elevated. Follow daily  Continue rifaximin.  lactulose to keep bowel movement 2-3 per day.  Hepatology following

## 2020-11-13 NOTE — PROGRESS NOTE ADULT - ASSESSMENT
65y Female from home, lives with daughter, ambulates with walker with PMH of recurrent SBO's, s/p exp lap, SB resection in 2015, ex lap, ALBINA in 2018, DVT, PE, on Xarelto, IVC filter, chronic leg swelling, and anasarca, came in to the ED due to hypotension during office visit. Patient was found to be bacteremic with bacteroids fragilis. Admitted in ICU due to hypotension and hypothermia. patient completed course of antibiotics . BCx X2 negative. Ammonia level elevated and patient refused NGT placement, mental status deteriorated and patient desaturated to high 70%, patient got intubated on 10/24 . Sputum was positive for MRSA and Stenotrophomonas. patient started on vancomycin and Levaquin. patient was given lactulose for high ammonia level. kidney function and liver function started to deteriorate and patient was leaning toward multi organ failure . high dose of Lasix started and patient was aggressively diuresed. Kidney function improved . Ammonia level dropped to less than 10.  Mental status improved and patient extubated on 11/3/2020. Patient was seen by wound specialist and decubitus ulcer debrided. patient had a drop in h/h s/p debridement . received 1 unit PRBC .   11/12  Transferred from ICU to SCU

## 2020-11-13 NOTE — PROGRESS NOTE ADULT - ASSESSMENT
Hospital course:  64 yo Female with Hx of recurrent SBO`s s/p exp lap, small bowel resection in 2015, ex lap, ALBINA in 2018, DVT, PE (was on Xarelto), IVC filter and s/p supra-hepatic IVC thrombosis/occlusion, anemia, anasarca was sent to ED by PCP for hypotension, generalized weakness and chills and was admitted on 10/13/2020 for hypotension, r/o sepsis , found to have Bacteroides fragilis bacteremia, suspected GI source, was treated with cefepime + metronidazole, remained hypothermic, hypotensive, was transferred to ICU and switched to meropenem on 10/21. Repeat CT 10/21 suggested  non-specific enterocolitis, noninfectious inflammatory bowel disease or ischemic bowel along with ileus, and concern for SBO given mild luminal narrowing at distal small bowel. Patient remained hypotensive, hypothermic, requiring pressor support, septic and became encephalopathic and got intubated on 10/24, also diagnosed with HCAP vs. aspirational PNA, sputum growing MRSA and Stenotrophomonas maltophilia.  She was switched to levofloxacin on 10/27 and now on levo plus vancomycin. 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 Liver enzymes were /are up-trending, along with bilirubin, was suspected due to ongoing sepsis. Patient also noted with cardiomyopathy with EF 10-15%. Got extubated, without pressor. Liver tests improved, but still significantly elevated.     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 (as per d/w primary team, it was non-conclusive)  - No caudate lobe hypertrophy reported (present in 75% of Budd Chiari cases), no macroregenerative nodules reported, characteristic pattern of parenchymal perfusion not described, but study reported limited due to motion artifact   - Venography would be gold standard for diagnosis, discussed earlier with IR, if patient will be more stable (possibly outpatient), can be evaluated at [facility] for Dx and potential intervention, however this time patient remains intubated , on pressor and now with EF 10-15 %, thus not transplant candidate and not candidate for TIPS  - was already on full dose anticoagulation, was being held b/o coagulopathy and now b/o severe thrombocytopenia, AC per primary team/hem (currently on DVT ppx)  - portal vein and IVC were patent on recent imaging  - Repeat US abd was done, but was limited and no comment either on hepatic vein again or on collaterals  - anticardiolipin antibody pos 1x (prior negative 1x)     # Abnormal liver enzymes with hyperbilirubinemia and coagulopathy  # Ascites  - Elevated liver enzymes were thought partially due to ongoing sepsis (only mildly elevated or normal on admission and started to worsen when patient condition deteriorated, became hypotensive, hypothermic; peak  / now 159, peak  / now 72; peak ALP  316 / now 87; peak Se bi 9.2 / now 5.6 )   - Possibly the combination of sepsis /MOF and also due to severely reduced LVEF (10-15%) and RV dysfunction - improving as volume status improved, although still significantly elevated, thus cannot fully rule out additional etiology  - Dx paracentesis could be useful (last 2 sets of BCx nl) but as d/w IR, no safe window based on last CT and now small ascites on recent US abdomen - US was limited, check with IR if amenable for Dx paracentesis  - Prior ascites analysis from 2019: ascites albumin< 0.2, protein < 1; serum albumin 1.0; SAAG< 1.1 (early Budd Chiari would usually give SAAG > 1.1, total protein > 2.5, and in late Budd Chiari SAAG> 1.1 and total protein < 2.5), SAAG < 1.1 with low protein could be due to bowel obstruction/infarction, serositis among others  - prior liver workup during prior admissions: KATHY neg, AMA neg, HBsAg neg, HBcAb IgM neg, HAV IgM neg  - Given the up-trending bilirubin, and coagulopathy, resent acute hep panel - was negative, EBV - past infection, HSV IgG pos, CMV PCR neg, can send VZV PCR, hep E serology; KATHY neg, can consider rest of AI workup  - anticardiolipin IgM pos, beta 2 glycoprotein pos     # Encephalopathy, possibly was multifactorial - resolved  - c/w lactulose to titrate 2-3 BM/day (hold if diarrhea) and c/w rifaximin   - CT head was not done b/o lac-k of focal signs    Will continue to follow  d/w primary team  Thank you for the consult

## 2020-11-13 NOTE — PROGRESS NOTE ADULT - PROBLEM SELECTOR PLAN 6
Multifactorial: Multiple organ failure, Elevated LFTS, Elevated amnionia level and baseline dementia.  Maintain fall and safety precautions.

## 2020-11-13 NOTE — PROGRESS NOTE ADULT - SUBJECTIVE AND OBJECTIVE BOX
Chief Complaint:  Patient is a 65y old  Female who presents with a chief complaint of Hypotension (13 Nov 2020 16:37)      Reason for consult: to r/o Budd Chiari Interval Events:   Patient was seen and examined at bedside. Was transferred out of ICU. Awake, alert, oriented to person, place and time till year and month. Denies abdominal pain.       Hospital Medications:  dextrose 5% + sodium chloride 0.45% with potassium chloride 10 mEq/L 100 milliLiter(s) IV Continuous <Continuous>  furosemide   Injectable 80 milliGRAM(s) IV Push every 12 hours  heparin   Injectable 5000 Unit(s) SubCutaneous every 12 hours  lactulose Syrup 10 Gram(s) Oral daily  midodrine. 10 milliGRAM(s) Oral three times a day  pantoprazole   Suspension 40 milliGRAM(s) Enteral Tube daily  rifAXIMin 550 milliGRAM(s) Oral two times a day      ROS:   General:  No  fevers, chills  ENT:  No sore throat, pain, runny nose  CV:  No pain, palpitations  Pulm:  No dyspnea, cough  GI:  No abdominal pain, no N/V, was not fed yet, no diarrhea or melena  Muscle:  No pain  Neuro:  Awake, alert, oriented to person, place and time till year and month    PHYSICAL EXAM:   Vital Signs:  Vital Signs Last 24 Hrs  T(C): 36.3 (13 Nov 2020 14:25), Max: 36.4 (12 Nov 2020 17:46)  T(F): 97.3 (13 Nov 2020 14:25), Max: 97.6 (12 Nov 2020 20:12)  HR: 66 (13 Nov 2020 14:25) (63 - 77)  BP: 110/59 (13 Nov 2020 14:25) (105/62 - 122/65)  BP(mean): 72 (12 Nov 2020 17:00) (72 - 72)  RR: 18 (13 Nov 2020 14:25) (16 - 28)  SpO2: 100% (13 Nov 2020 14:25) (99% - 100%)  Daily     Daily     GENERAL: no acute distress  NEURO: alert, oriented person, place and time till year, month  HEENT: icteric sclera  CHEST: no respiratory distress, no accessory muscle use  CARDIAC: regular rate, rhythm  ABDOMEN: soft, non-tender, non-distended, no rebound or guarding  EXTREMITIES: warm, well perfused, no edema      LABS: reviewed                        9.0    10.54 )-----------( 137      ( 13 Nov 2020 06:56 )             27.1     11-13    145  |  108  |  30<H>  ----------------------------<  76  3.2<L>   |  30  |  1.53<H>    Ca    8.2<L>      13 Nov 2020 06:56  Phos  3.7     11-13  Mg     2.0     11-13    TPro  5.2<L>  /  Alb  2.3<L>  /  TBili  5.3<H>  /  DBili  x   /  AST  147<H>  /  ALT  67<H>  /  AlkPhos  89  11-12    LIVER FUNCTIONS - ( 12 Nov 2020 06:34 )  Alb: 2.3 g/dL / Pro: 5.2 g/dL / ALK PHOS: 89 U/L / ALT: 67 U/L DA / AST: 147 U/L / GGT: x             Interval Diagnostic Studies: see sunrise for full report

## 2020-11-13 NOTE — PROGRESS NOTE ADULT - PROBLEM SELECTOR PLAN 7
Encourage oral intake.  Protein 5.2  albumin  2.3  Ensure Enlive added TID. Ensure Pudding BID  Needs assistance with eating. Encourage oral intake.  Protein 5.2  albumin  2.3  2CalHF added TID. Ensure Pudding BID  Needs assistance with eating.

## 2020-11-14 NOTE — PROGRESS NOTE ADULT - SUBJECTIVE AND OBJECTIVE BOX
Patient was seen and examined  Patient is a 65y old  Female who presents with a chief complaint of Hypotension (14 Nov 2020 08:42)      INTERVAL HPI/OVERNIGHT EVENTS:  T(C): 36.7 (11-14-20 @ 05:01), Max: 36.7 (11-13-20 @ 20:11)  HR: 75 (11-14-20 @ 07:06) (66 - 81)  BP: 116/61 (11-14-20 @ 07:06) (103/54 - 117/66)  RR: 17 (11-14-20 @ 05:01) (16 - 18)  SpO2: 100% (11-14-20 @ 05:01) (100% - 100%)  Wt(kg): --  I&O's Summary    13 Nov 2020 07:01  -  14 Nov 2020 07:00  --------------------------------------------------------  IN: 0 mL / OUT: 2500 mL / NET: -2500 mL        LABS:                        9.4    13.14 )-----------( 149      ( 14 Nov 2020 07:58 )             28.7     11-14    146<H>  |  108  |  28<H>  ----------------------------<  76  2.7<LL>   |  30  |  1.44<H>    Ca    8.2<L>      14 Nov 2020 07:58  Phos  3.3     11-14  Mg     1.8     11-14    TPro  5.6<L>  /  Alb  2.4<L>  /  TBili  3.8<H>  /  DBili  x   /  AST  105<H>  /  ALT  60  /  AlkPhos  107  11-14        CAPILLARY BLOOD GLUCOSE      POCT Blood Glucose.: 90 mg/dL (14 Nov 2020 06:33)  POCT Blood Glucose.: 154 mg/dL (14 Nov 2020 00:03)  POCT Blood Glucose.: 132 mg/dL (13 Nov 2020 16:58)  POCT Blood Glucose.: 101 mg/dL (13 Nov 2020 11:40)              MEDICATIONS  (STANDING):  dextrose 5% 1000 milliLiter(s) (50 mL/Hr) IV Continuous <Continuous>  furosemide   Injectable 80 milliGRAM(s) IV Push every 12 hours  heparin   Injectable 5000 Unit(s) SubCutaneous every 12 hours  lactulose Syrup 10 Gram(s) Oral daily  midodrine. 10 milliGRAM(s) Oral three times a day  pantoprazole   Suspension 40 milliGRAM(s) Enteral Tube daily  potassium chloride  10 mEq/100 mL IVPB 10 milliEquivalent(s) IV Intermittent every 1 hour  rifAXIMin 550 milliGRAM(s) Oral two times a day    MEDICATIONS  (PRN):      RADIOLOGY & ADDITIONAL TESTS:    Imaging Personally Reviewed:  [ ] YES  [ ] NO    REVIEW OF SYSTEMS:  CONSTITUTIONAL: No fever, weight loss, or fatigue  EYES: No eye pain, visual disturbances, or discharge  ENMT:  No difficulty hearing, tinnitus, vertigo; No sinus or throat pain  NECK: No pain or stiffness  BREASTS: No pain, masses, or nipple discharge  RESPIRATORY: No cough, wheezing, chills or hemoptysis; No shortness of breath  CARDIOVASCULAR: No chest pain, palpitations, dizziness, or leg swelling  GASTROINTESTINAL: No abdominal or epigastric pain. No nausea, vomiting, or hematemesis; No diarrhea or constipation. No melena or hematochezia.  GENITOURINARY: No dysuria, frequency, hematuria, or incontinence  NEUROLOGICAL: No headaches, memory loss, loss of strength, numbness, or tremors  SKIN: No itching, burning, rashes, or lesions   LYMPH NODES: No enlarged glands  ENDOCRINE: No heat or cold intolerance; No hair loss  MUSCULOSKELETAL: No joint pain or swelling; No muscle, back, or extremity pain  PSYCHIATRIC: No depression, anxiety, mood swings, or difficulty sleeping  HEME/LYMPH: No easy bruising, or bleeding gums  ALLERY AND IMMUNOLOGIC: No hives or eczema      Consultant(s) Notes Reviewed:  [x] YES  [ ] NO    PHYSICAL EXAM:  GENERAL: NAD, well-groomed, well-developed  HEAD:  Atraumatic, Normocephalic  EYES: EOMI, PERRLA, conjunctiva and sclera clear  ENMT: No tonsillar erythema, exudates, or enlargement; Moist mucous membranes, Good dentition, No lesions  NECK: Supple, No JVD, Normal thyroid  NERVOUS SYSTEM:  Alert & Oriented X3, Good concentration; Motor Strength 5/5 B/L upper and lower extremities; DTRs 2+ intact and symmetric  CHEST/LUNG: Clear to percussion bilaterally; No rales, rhonchi, wheezing, or rubs  HEART: Regular rate and rhythm; No murmurs, rubs, or gallops  ABDOMEN: Soft, Nontender, Nondistended; Bowel sounds present  EXTREMITIES:  2+ Peripheral Pulses, No clubbing, cyanosis, or edema  LYMPH: No lymphadenopathy noted  SKIN: No rashes or lesions    Care Discussed with Consultants/Other Providers [ x] YES  [ ] NO

## 2020-11-14 NOTE — PROGRESS NOTE ADULT - SUBJECTIVE AND OBJECTIVE BOX
MARIBETH PADILLA    SCU progress note    INTERVAL HPI/OVERNIGHT EVENTS: ***    DNR [ ]   DNI  [  ]    Covid - 19 PCR:     The 4Ms    What Matters Most: see GOC  Age appropriate Medications/Screen for High Risk Medication: Yes  Mentation: see CAM below  Mobility: defer to physical exam    The Confusion Assessment Method (CAM) Diagnostic Algorithm     1: Acute Onset or Fluctuating Course  - Is there evidence of an acute change in mental status from the patient’s baseline? Did the (abnormal) behavior  fluctuate during the day, that is, tend to come and go, or increase and decrease in severity?  [ ] YES [ ] NO     2: Inattention  - Did the patient have difficulty focusing attention, being easily distractible, or having difficulty keeping track of what was being said?  [ ] YES [ ] NO     3: Disorganized thinking  -Was the patient’s thinking disorganized or incoherent, such as rambling or irrelevant conversation, unclear or illogical flow of ideas, or unpredictable switching from subject to subject?  [ ] YES [ ] NO    4: Altered Level of consciousness?  [ ] YES [ ] NO    The diagnosis of delirium by CAM requires the presence of features 1 and 2 and either 3 or 4.    PRESSORS: [ ] YES [ ] NO  rifAXIMin 550 milliGRAM(s) Oral two times a day    Cardiovascular:  Heart Failure  Acute   Acute on Chronic  Chronic       furosemide   Injectable 80 milliGRAM(s) IV Push every 12 hours  midodrine. 10 milliGRAM(s) Oral three times a day    Pulmonary:    Hematalogic:  heparin   Injectable 5000 Unit(s) SubCutaneous every 12 hours    Other:  dextrose 5% + sodium chloride 0.45% with potassium chloride 10 mEq/L 100 milliLiter(s) IV Continuous <Continuous>  lactulose Syrup 10 Gram(s) Oral daily  pantoprazole   Suspension 40 milliGRAM(s) Enteral Tube daily  potassium chloride  10 mEq/100 mL IVPB 10 milliEquivalent(s) IV Intermittent every 1 hour    dextrose 5% + sodium chloride 0.45% with potassium chloride 10 mEq/L 100 milliLiter(s) IV Continuous <Continuous>  furosemide   Injectable 80 milliGRAM(s) IV Push every 12 hours  heparin   Injectable 5000 Unit(s) SubCutaneous every 12 hours  lactulose Syrup 10 Gram(s) Oral daily  midodrine. 10 milliGRAM(s) Oral three times a day  pantoprazole   Suspension 40 milliGRAM(s) Enteral Tube daily  potassium chloride  10 mEq/100 mL IVPB 10 milliEquivalent(s) IV Intermittent every 1 hour  rifAXIMin 550 milliGRAM(s) Oral two times a day    Drug Dosing Weight  Height (cm): 167.6 (21 Oct 2020 02:26)  Weight (kg): 56.2 (21 Oct 2020 02:26)  BMI (kg/m2): 20 (21 Oct 2020 02:26)  BSA (m2): 1.63 (21 Oct 2020 02:26)    CENTRAL LINE: [ ] YES [ ] NO  LOCATION:   DATE INSERTED:  REMOVE: [ ] YES [ ] NO  EXPLAIN:    TREJO: [ ] YES [ ] NO    DATE INSERTED:  REMOVE:  [ ] YES [ ] NO  EXPLAIN:    PAST MEDICAL & SURGICAL HISTORY:  Anasarca    Pulmonary embolism    DVT (deep venous thrombosis)    SBO (small bowel obstruction)    H/O exploratory laparotomy                11-13 @ 07:01  -  11-14 @ 07:00  --------------------------------------------------------  IN: 0 mL / OUT: 2500 mL / NET: -2500 mL            PHYSICAL EXAM:    GENERAL: NAD, well-groomed, well-developed  HEAD:  Atraumatic, Normocephalic  EYES: EOMI, PERRLA, conjunctiva and sclera clear  ENMT: No tonsillar erythema, exudates, or enlargement; Moist mucous membranes, Good dentition, No lesions  NECK: Supple, No JVD, Normal thyroid  NERVOUS SYSTEM:  Alert & Oriented X3, Good concentration; Motor Strength 5/5 B/L upper and lower extremities; DTRs 2+ intact and symmetric  CHEST/LUNG: Clear to percussion bilaterally; No rales, rhonchi, wheezing, or rubs  HEART: Regular rate and rhythm; No murmurs, rubs, or gallops  ABDOMEN: Soft, Nontender, Nondistended; Bowel sounds present  EXTREMITIES:  2+ Peripheral Pulses, No clubbing, cyanosis, or edema  LYMPH: No lymphadenopathy noted  SKIN: No rashes or lesions      LABS:  CBC Full  -  ( 14 Nov 2020 07:58 )  WBC Count : 13.14 K/uL  RBC Count : 3.19 M/uL  Hemoglobin : 9.4 g/dL  Hematocrit : 28.7 %  Platelet Count - Automated : 149 K/uL  Mean Cell Volume : 90.0 fl  Mean Cell Hemoglobin : 29.5 pg  Mean Cell Hemoglobin Concentration : 32.8 gm/dL  Auto Neutrophil # : x  Auto Lymphocyte # : x  Auto Monocyte # : x  Auto Eosinophil # : x  Auto Basophil # : x  Auto Neutrophil % : x  Auto Lymphocyte % : x  Auto Monocyte % : x  Auto Eosinophil % : x  Auto Basophil % : x    11-14    146<H>  |  108  |  28<H>  ----------------------------<  76  2.7<LL>   |  30  |  1.44<H>    Ca    8.2<L>      14 Nov 2020 07:58  Phos  3.3     11-14  Mg     1.8     11-14    TPro  5.6<L>  /  Alb  2.4<L>  /  TBili  3.8<H>  /  DBili  x   /  AST  105<H>  /  ALT  60  /  AlkPhos  107  11-14              [  ]  DVT Prophylaxis  [  ]  Nutrition, Brand, Rate         Goal Rate        Abnormal Nutritional Status -  Malnutrition   Cachexia      Morbid Obesity BMI >/=40    RADIOLOGY & ADDITIONAL STUDIES:  ***    Goals of Care Discussion with Family/Proxy/Other   - see note from/family meeting set up for...     MARIBETH PADILLA    SCU progress note: pt seen chart reviewed, d/w rounding attending    INTERVAL HPI/OVERNIGHT EVENTS: no significant issues overnight, surgical wound cx grew Enterococcus and Candida      DNR [x ]   DNI  [  ]   TRIAL OF INTUBATION    Covid - 19 PCR: Negative 10/19    The 4Ms    What Matters Most: see GOC  Age appropriate Medications/Screen for High Risk Medication: Yes  Mentation: see CAM below  Mobility: defer to physical exam    The Confusion Assessment Method (CAM) Diagnostic Algorithm     1: Acute Onset or Fluctuating Course  - Is there evidence of an acute change in mental status from the patient’s baseline? Did the (abnormal) behavior  fluctuate during the day, that is, tend to come and go, or increase and decrease in severity?  [ ] YES [x ] NO     2: Inattention  - Did the patient have difficulty focusing attention, being easily distractible, or having difficulty keeping track of what was being said?  [ ] YES [x ] NO     3: Disorganized thinking  -Was the patient’s thinking disorganized or incoherent, such as rambling or irrelevant conversation, unclear or illogical flow of ideas, or unpredictable switching from subject to subject?  [ ] YES [x ] NO    4: Altered Level of consciousness?  [ ] YES [x ] NO    The diagnosis of delirium by CAM requires the presence of features 1 and 2 and either 3 or 4.    PRESSORS: [ ] YES [x ] NO  rifAXIMin 550 milliGRAM(s) Oral two times a day    Cardiovascular:  Heart Failure  Acute   Acute on Chronic  Chronic       furosemide   Injectable 80 milliGRAM(s) IV Push every 12 hours  midodrine. 10 milliGRAM(s) Oral three times a day    Pulmonary:    Hematalogic:  heparin   Injectable 5000 Unit(s) SubCutaneous every 12 hours    Other:  dextrose 5% + sodium chloride 0.45% with potassium chloride 10 mEq/L 100 milliLiter(s) IV Continuous <Continuous>  lactulose Syrup 10 Gram(s) Oral daily  pantoprazole   Suspension 40 milliGRAM(s) Enteral Tube daily  potassium chloride  10 mEq/100 mL IVPB 10 milliEquivalent(s) IV Intermittent every 1 hour    dextrose 5% + sodium chloride 0.45% with potassium chloride 10 mEq/L 100 milliLiter(s) IV Continuous <Continuous>  furosemide   Injectable 80 milliGRAM(s) IV Push every 12 hours  heparin   Injectable 5000 Unit(s) SubCutaneous every 12 hours  lactulose Syrup 10 Gram(s) Oral daily  midodrine. 10 milliGRAM(s) Oral three times a day  pantoprazole   Suspension 40 milliGRAM(s) Enteral Tube daily  potassium chloride  10 mEq/100 mL IVPB 10 milliEquivalent(s) IV Intermittent every 1 hour  rifAXIMin 550 milliGRAM(s) Oral two times a day    Drug Dosing Weight  Height (cm): 167.6 (21 Oct 2020 02:26)  Weight (kg): 56.2 (21 Oct 2020 02:26)  BMI (kg/m2): 20 (21 Oct 2020 02:26)  BSA (m2): 1.63 (21 Oct 2020 02:26)    CENTRAL LINE: [ ] YES [ ] NO  LOCATION:   DATE INSERTED:  REMOVE: [ ] YES [ ] NO  EXPLAIN:    TREJO: [ ] YES [ ] NO    DATE INSERTED:  REMOVE:  [ ] YES [ ] NO  EXPLAIN:    PAST MEDICAL & SURGICAL HISTORY:  Anasarca    Pulmonary embolism    DVT (deep venous thrombosis)    SBO (small bowel obstruction)    H/O exploratory laparotomy      11-13 @ 07:01  -  11-14 @ 07:00  --------------------------------------------------------  IN: 0 mL / OUT: 2500 mL / NET: -2500 mL    PHYSICAL EXAM:  GENERAL: side lying in bed, NAD  HEAD:  Atraumatic, Normocephalic  EYES: EOMI, PERRLA, conjunctiva and sclera clear  ENMT: No tonsillar erythema, exudates  NECK: Supple, No JVD  NERVOUS SYSTEM:  Alert & Oriented X3, Good concentration; Motor Strength 5/5 B/L upper and lower extremities; DTRs 2+ intact and symmetric  CHEST/LUNG: Diminished breath sounds bilaterally  HEART: Regular rate and rhythm; No murmurs, rubs, or gallops  ABDOMEN: Soft, Nontender, Nondistended; Bowel sounds present  EXTREMITIES:  +1 edema lower extremities, +pedal pulses, No clubbing, cyanosis  LYMPH: No lymphadenopathy noted  SKIN: Skin tears Right posterior shoulder, right inner thigh. Unstageable sacrum. +pink granulation as well as dark tissue +tunneling noted. Edges of wound pink    LABS:  CBC Full  -  ( 14 Nov 2020 07:58 )  WBC Count : 13.14 K/uL  RBC Count : 3.19 M/uL  Hemoglobin : 9.4 g/dL  Hematocrit : 28.7 %  Platelet Count - Automated : 149 K/uL  Mean Cell Volume : 90.0 fl  Mean Cell Hemoglobin : 29.5 pg  Mean Cell Hemoglobin Concentration : 32.8 gm/dL  Auto Neutrophil # : x  Auto Lymphocyte # : x  Auto Monocyte # : x  Auto Eosinophil # : x  Auto Basophil # : x  Auto Neutrophil % : x  Auto Lymphocyte % : x  Auto Monocyte % : x  Auto Eosinophil % : x  Auto Basophil % : x    11-14    146<H>  |  108  |  28<H>  ----------------------------<  76  2.7<LL>   |  30  |  1.44<H>    Ca    8.2<L>      14 Nov 2020 07:58  Phos  3.3     11-14  Mg     1.8     11-14    TPro  5.6<L>  /  Alb  2.4<L>  /  TBili  3.8<H>  /  DBili  x   /  AST  105<H>  /  ALT  60  /  AlkPhos  107  11-14    Culture - Surgical Swab (11.12.20 @ 05:01)    Specimen Source: .Surgical Swab Sacral decub    Culture Results:   Few Enterococcus faecium Susceptibility to follow.  Rare Candida albicans "Susceptibilities not performed"    [x ]  DVT Prophylaxis      RADIOLOGY & ADDITIONAL STUDIES:    < from: Xray Chest 1 View- PORTABLE-Routine (Xray Chest 1 View- PORTABLE-Routine in AM.) (11.11.20 @ 08:16) >  Comparison: 11/10/2020.    Single AP view submitted.  The lung apices are partially obscured by the patient's head and chin.    Right central venous catheter is unchanged in position.  Nasogastric tube is been removed.    The evaluation of the cardiomediastinal silhouette is limited on portable technique.    There are persistent bilateral pleural effusions with underlying airspace consolidation.    Impression:    Findings as discussed above.    < end of copied text >    Goals of Care Discussion   - see consult note from palliative team 10/23 and subsequent progress notes

## 2020-11-14 NOTE — PROGRESS NOTE ADULT - ASSESSMENT
Patient is a 65y old  Female from home, lives with daughter, ambulates with walker with PMH of recurrent SBO's, s/p exp lap, SB resection in 2015, ex lap, ALBINA in 2018, DVT, PE, on Xarelto, IVC filter, chronic leg swelling, and anasarca, Now send in to the ER by her PCP, Dr. Ross, for evaluation of  generalized weakness and hypotension BP of 80/40 during office visit. On admission, she found to have no fever and BP was in lower normal range and no Leukocytosis. The CXR is clear and CT abd/pelvis consistent with Ileus. The ID consult requested to assist with evaluation of infectious etiology of episode of hypotension. Found to have Bacteroides bacteremia and now developed septic shock on Cefepime and Flagyl. Hence transferred to ICU. 10/20/20.    # Hypotension  at office- BP of 80/40 - resolved   #  Bacteroides fragilis Bacteremia - 10/13/20 - ? source most likely GI- Repeat Blood Cxs have NGTD 10/16/20  # Ileus  - h/o recurrent SBO and s/p EXp. LAp  # COVID 19 negative   # Septic shock ( Hypothermia + hypotensive)- transferred to ICU since requiring pressor- source GI, The CT abd/pelvis shows nonspecific enterocolitis, noninfectious inflammatory bowel disease, or ischemic bowel, along with ileus.   # Pneumonia- HCAP vs Aspiration - on CXR 10/24 and CT chest 10/21- sputum Cx grew Methicillin resistant Staphylococcus aureus  and Stenotrophomonas maltophilia.  # S/p extubated 11/9/20  # Infected sacral ulcer- s/p debridement and culture grew Enterococcus and Candida, sensitivity is pending      would recommend:    1. Follow up final sacral wound culture, grew Enterococcus and Candida, sensitivity is pending   2. Start on Fluconazole and Linezolid 600 until sensitivity is available   3. Monitor kidney function, is improving    4. Aspiration precaution  5. Continue Sacral Wound care  and Frequent Repositioning   6. Monitor WBC count      Attending Attestation:    Spent more than 45 minutes on total encounter, more than 50 % of the visit was spent counseling and/or coordinating care by the Attending physician.

## 2020-11-14 NOTE — PROGRESS NOTE ADULT - SUBJECTIVE AND OBJECTIVE BOX
Chief Complaint:  Patient is a 65y old  Female who presents with a chief complaint of Hypotension (14 Nov 2020 09:25)      Reason for consult: to r/o [place] Chiari Interval Events: Patient was seen and examined at bedside. Remains awake, alert, oriented. She c/o sacral pain. No other new complaints. She was requesting to see dentist if possible.     Hospital Medications:  dextrose 5% 1000 milliLiter(s) IV Continuous <Continuous>  dextrose 5% + sodium chloride 0.45% with potassium chloride 10 mEq/L 100 milliLiter(s) IV Continuous <Continuous>  furosemide   Injectable 80 milliGRAM(s) IV Push every 12 hours  heparin   Injectable 5000 Unit(s) SubCutaneous every 12 hours  lactulose Syrup 10 Gram(s) Oral daily  midodrine. 10 milliGRAM(s) Oral three times a day  pantoprazole   Suspension 40 milliGRAM(s) Enteral Tube daily  rifAXIMin 550 milliGRAM(s) Oral two times a day      ROS:   General:  No  fevers, chills  Eyes:  No vision problem reported  ENT:  No sore throat, pain, runny nose  CV:  No pain, palpitations  Pulm:  Few coughs noted during exam  GI:  Denies abdominal pain, N/V  :  No  dysuria  Muscle:  bedbound  Neuro:  awake, alert, oriented      PHYSICAL EXAM:   Vital Signs:  Vital Signs Last 24 Hrs  T(C): 36.6 (14 Nov 2020 13:37), Max: 36.7 (13 Nov 2020 20:11)  T(F): 97.8 (14 Nov 2020 13:37), Max: 98 (13 Nov 2020 20:11)  HR: 79 (14 Nov 2020 13:37) (74 - 81)  BP: 104/- (14 Nov 2020 13:37) (103/54 - 117/66)  BP(mean): --  RR: 18 (14 Nov 2020 13:37) (16 - 18)  SpO2: 100% (14 Nov 2020 13:37) (100% - 100%)  Daily     Daily     GENERAL: no acute distress; ill appearing, but overall improved  NEURO: awake, alert, oriented; cannot cooperate to assess asterixis  HEENT: icteric sclera  CHEST: no respiratory distress, no accessory muscle use  CARDIAC: regular rate, rhythm  ABDOMEN: soft, non-tender, non-distended, no rebound or guarding, prior surgical scars  EXTREMITIES: warm, well perfused, no edema      LABS: reviewed                        9.4    13.14 )-----------( 149      ( 14 Nov 2020 07:58 )             28.7     11-14    146<H>  |  108  |  28<H>  ----------------------------<  76  2.7<LL>   |  30  |  1.44<H>    Ca    8.2<L>      14 Nov 2020 07:58  Phos  3.3     11-14  Mg     1.8     11-14    TPro  5.6<L>  /  Alb  2.4<L>  /  TBili  3.8<H>  /  DBili  x   /  AST  105<H>  /  ALT  60  /  AlkPhos  107  11-14    LIVER FUNCTIONS - ( 14 Nov 2020 07:58 )  Alb: 2.4 g/dL / Pro: 5.6 g/dL / ALK PHOS: 107 U/L / ALT: 60 U/L DA / AST: 105 U/L / GGT: x             Interval Diagnostic Studies: see sunrise for full report

## 2020-11-14 NOTE — PROGRESS NOTE ADULT - PROBLEM SELECTOR PLAN 1
Secondary to bacteremia resolved  Sputum positive for MRSA and Stenotrophomas  s/p abx   restarting above abx for +wound Cx  f/u sensitivity

## 2020-11-14 NOTE — PROGRESS NOTE ADULT - NUTRITIONAL ASSESSMENT
This patient has been assessed with a concern for Malnutrition and has been determined to have a diagnosis/diagnoses of Severe protein-calorie malnutrition.    This patient is being managed with:   Diet Dysphagia 1 Pureed-Nectar Consistency Fluid-  Supplement Feeding Modality:  Oral  Two Papi HN Cans or Servings Per Day:  3       Frequency:  Three Times a day  Ensure Pudding Cans or Servings Per Day:  2       Frequency:  Two Times a day  Entered: Nov 13 2020  1:47PM     This patient has been assessed with a concern for Malnutrition and has been determined to have a diagnosis/diagnoses of Severe protein-calorie malnutrition.    This patient is being managed with:   Diet, Dysphagia 1 Pureed-Nectar Consistency Fluid:   Supplement Feeding Modality:  Oral  Two Papi HN Cans or Servings Per Day:  3       Frequency:  Three Times a day  Ensure Pudding Cans or Servings Per Day:  2       Frequency:  Two Times a day (11-13-20 @ 13:46) [Active]

## 2020-11-14 NOTE — PROGRESS NOTE ADULT - ASSESSMENT
Hospital course:  64 yo Female with Hx of recurrent SBO`s s/p exp lap, small bowel resection in 2015, ex lap, ALBINA in 2018, DVT, PE (was on Xarelto), IVC filter and s/p supra-renal IVC thrombosis/occlusion, anemia, anasarca was sent to ED by PCP for hypotension, generalized weakness and chills and was admitted on 10/13/2020 for hypotension, r/o sepsis.   She was found to have Bacteroides fragilis bacteremia, suspected GI source, was treated with cefepime + metronidazole, remained hypothermic, hypotensive, was transferred to ICU and switched to meropenem on 10/21. Repeat CT 10/21 suggested  non-specific enterocolitis, noninfectious inflammatory bowel disease or ischemic bowel along with ileus, and concern for SBO given mild luminal narrowing at distal small bowel. Patient remained hypotensive, hypothermic, requiring pressor support, septic and became encephalopathic and got intubated on 10/24. She was also diagnosed with HCAP vs. aspirational PNA, sputum growing MRSA and Stenotrophomonas maltophilia.  She was switched to levofloxacin on 10/27 and vancomycin was added on 10/31. She completed antibiotics on 11/11.     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 Further evaluation  / definite diagnosis of Budd Chiari was not feasible due to the patient overall condition / comorbidities.  Elevated liver enzymes were thought partially due to ongoing sepsis. They were only mildly elevated or normal on admission and started to worsen when patient condition deteriorated, became hypotensive, hypothermic and was transferred to ICU. Patient also developed  MOF, with severely reduced LVEF (10-15%) and RV dysfunction, thus congestion could have also contributed to the abnormal liver tests.  Peak liver enzymes were peak  ,  ,  ALP  316, and  Se bi 9.2. Liver tests improved with sepsis management and improvement of fluid status.  Now , ALT 60, , and Se bi 3.8. They have not normalized yet, but continued to improve.      Not clear whether it was caused only by the sepsis, MOF, congestion and possibly drug induced component (multiple antibiotics), or  there is indeed component of vascular disease.  No caudate lobe hypertrophy was reported (present in 75% of Budd Chiari cases), no macroregenerative nodules were  reported, characteristic pattern of parenchymal perfusion was not described, but study was reported limited due to motion artifact and the gold standard for diagnosis would be venography. It was postponed / was not feasible (required transfer to [facility]) b/o the patient overall condition. Dx paracentesis was not feasible during this admission as d/w IR. However, prior ascites analysis from 2019 with ascites albumin< 0.2, protein < 1; serum albumin 1.0; SAAG< 1.1 (early Budd Chiari would usually give SAAG > 1.1, total protein > 2.5, and in late Budd Chiari SAAG> 1.1 and total protein < 2.5). SAAG < 1.1 with low protein could have been due to bowel obstruction/infarction, serositis among others. Now patient improved, but the question whether there would be therapeutic consequence of further workup at present. Patient is not transplant and not TIPS candidate (EF 10-15%).  Patient has already been on full dose anticoagulation, but was discontinued because of coagulopathy, thrombocytopenia, now only on DVT ppx dose.  Prior liver workup: HBsAg neg, HBcAb IgM neg, HCV ab neg,  HAV IgM neg; KATHY neg; EBV, HSV past infection; CMV PCR neg; Hep E or rest of AI workup has not got sent out.  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 (as per d/w primary team, it was non-conclusive); hematology appreciated  - Venography would be gold standard for diagnosis, discussed earlier with IR, if patient will be more stable (possibly outpatient), can be evaluated at [facility] for Dx and potential intervention  - However, now with newly developed heart failure (EF 10-15%), thus not transplant or TIPS candidate  - Was already on full dose anticoagulation, was being held b/o coagulopathy and b/o severe thrombocytopenia, AC per primary team/hem (currently on DVT ppx)  - now patient is more cooperative, can consider to attempt another Doppler US    # Abnormal liver enzymes with hyperbilirubinemia (improving) and coagulopathy (resolved)   # Ascites  - possibly was combination of sepsis, HF and maybe drug induced component (multiple antibiotics); but cannot fully rule out that additional etiology, including vascular disease as above  - if no further improvement can consider additional workup  - mgmt. of CHF per primary team    # Encephalopathy, possibly was multifactorial - resolved  - c/w lactulose to titrate 2-3 BM/day (hold if diarrhea) and c/w rifaximin   - CT head was not done b/o lac-k of focal signs    Will continue to follow  D/w primary team  Thank you for the consult

## 2020-11-14 NOTE — PROGRESS NOTE ADULT - SUBJECTIVE AND OBJECTIVE BOX
Patient is seen and examined at the bed side, is afebrile.  The WBC count is elevated and sacral wound culture grew Enterococcus faecium and Candida albicans, sensitivity is pending.      REVIEW OF SYSTEMS: All other review systems are negative        ALLERGIES: No Known Allergies      Vital Signs Last 24 Hrs  T(C): 36.6 (14 Nov 2020 13:37), Max: 36.7 (13 Nov 2020 20:11)  T(F): 97.8 (14 Nov 2020 13:37), Max: 98 (13 Nov 2020 20:11)  HR: 79 (14 Nov 2020 13:37) (74 - 81)  BP: 104/- (14 Nov 2020 13:37) (103/54 - 117/66)  BP(mean): --  RR: 18 (14 Nov 2020 13:37) (16 - 18)  SpO2: 100% (14 Nov 2020 13:37) (100% - 100%)        PHYSICAL EXAM:  GENERAL: Not in distress, on oxygen via NC  CHEST/LUNG: Not using accessory muscles   HEART: s1 and s2 present  ABDOMEN:  Nontender and  Nondistended  EXTREMITIES: B/L UE edematous  CNS: Awake and Alert         LABS:                        9.4    13.14 )-----------( 149      ( 14 Nov 2020 07:58 )             28.7                           9.0    10.54 )-----------( 137      ( 13 Nov 2020 06:56 )             27.1         11-14    146<H>  |  108  |  28<H>  ----------------------------<  76  2.7<LL>   |  30  |  1.44<H>    Ca    8.2<L>      14 Nov 2020 07:58  Phos  3.3     11-14  Mg     1.8     11-14    TPro  5.6<L>  /  Alb  2.4<L>  /  TBili  3.8<H>  /  DBili  x   /  AST  105<H>  /  ALT  60  /  AlkPhos  107  11-14      11-12    143  |  105  |  29<H>  ----------------------------<  61<L>  3.0<L>   |  28  |  1.65<H>    Ca    8.2<L>      12 Nov 2020 06:34  Phos  3.3     11-12  Mg     2.2     11-12    TPro  5.2<L>  /  Alb  2.3<L>  /  TBili  5.3<H>  /  DBili  x   /  AST  147<H>  /  ALT  67<H>  /  AlkPhos  89  11-12         11-06    138  |  104  |  37<H>  ----------------------------<  81  3.9   |  25  |  2.37<H>    Ca    8.1<L>      06 Nov 2020 06:20  Phos  3.4     11-06  Mg     2.0     11-06    TPro  5.1<L>  /  Alb  2.8<L>  /  TBili  9.2<H>  /  DBili  x   /  AST  233<H>  /  ALT  99<H>  /  AlkPhos  96  11-06      Vancomycin Level, Trough (11.07.20 @ 21:49)   Vancomycin Level, Trough: 16.1:     Vancomycin Level, Trough (11.07.20 @ 07:08)   Vancomycin Level, Trough: 19.5    vanVancomycin Level, Trough (11.06.20 @ 06:20)   Vancomycin Level, Trough: 19.9:     Vancomycin Level, Trough (11.04.20 @ 06:12)   Vancomycin Level, Trough: 31.0:     Vancomycin Level, Trough (11.03.20 @ 04:11)   Vancomycin Level, Trough: 16.1:        MEDICATIONS  (STANDING):    dextrose 5% 1000 milliLiter(s) (50 mL/Hr) IV Continuous <Continuous>  dextrose 5% + sodium chloride 0.45% with potassium chloride 10 mEq/L 100 milliLiter(s) (50 mL/Hr) IV Continuous <Continuous>  furosemide   Injectable 80 milliGRAM(s) IV Push every 12 hours  heparin   Injectable 5000 Unit(s) SubCutaneous every 12 hours  lactulose Syrup 10 Gram(s) Oral daily  midodrine. 10 milliGRAM(s) Oral three times a day  pantoprazole   Suspension 40 milliGRAM(s) Enteral Tube daily  rifAXIMin 550 milliGRAM(s) Oral two times a day      RADIOLOGY & ADDITIONAL TESTS:    11/4/20 : Xray Chest 1 View- PORTABLE-Routine (Xray Chest 1 View- PORTABLE-Routine in AM.) (11.04.20 @ 10:59) Reexpansion of the left lung with residual left pleural effusion and/or infiltrate.  Right pulmonary edema unchanged.  Tubes and catheters in satisfactory position.      10/25/20: Xray Chest 1 View- PORTABLE-Routine (Xray Chest 1 View- PORTABLE-Routine in AM.) (10.25.20 @ 09:21) Frontal expiratory view of the chest shows the heart to be similar in size. Endotracheal tube, right jugular line and feeding tube remain present.    The lungs show similar left upper lobe infiltrate with progression of right perihilar infiltrate. Pleural effusions are similar. There is no evidence of pneumothorax.      10/23/20 : Xray Chest 1 View-PORTABLE IMMEDIATE (Xray Chest 1 View-PORTABLE IMMEDIATE .) (10.23.20 @ 19:09) Feeding tube tip near GE junction as noted. Questionable right upper lobe infiltrate. Follow-up study is recommended as clinically warranted.      10/21/20 : CT Abdomen and Pelvis w/ Oral Cont and w/ IV Cont (10.21.20 @ 15:59) Mural thickening of the left colon and rectum. Liquid stool in the colon. Apparent segmental mural thickening of the mid to distal small bowel and the proximal duodenum. Findings may represent nonspecific enterocolitis, noninfectious inflammatory bowel disease, or ischemic bowel. Clinical correlation is recommended. The celiac axis artery, SMA, and KINA are patent without stenosis.    Dilatation of the mid small bowel is again noted. Oral contrast has reached the terminal ileum. Findings may represent small bowel obstruction, or ileus related to nonspecific enterocolitis.   Cholelithiasis.    Moderate to large ascites in the abdomen, increased since the previous examination. 5 mm nonspecific noncalcified left upper lobe lung nodule; if the patient's is in the high risk category (i.e. smoker), follow-up chest CT may be pursued in 12 months to ensure stability.    Combination of atelectasis and consolidation in the left lower lobe.    Possible 1.0 cm hypodense lesion in the left lobe of the thyroid. Thyroid ultrasound may be pursued for further evaluation.   Mild bilateral pleural effusions.    Aging determinate compression fracture at T5 vertebra.        10/13/20 : CT Abdomen and Pelvis w/ IV Cont (10.13.20 @ 18:50) Diffuse dilatation of small bowel loops without a clear transition is slightly increased, likely an ileus.  Decreased moderate ascites.    1.5 cm pancreatic versus peripancreatic soft tissue nodule    10/13/20 : Xray Chest 1 View- PORTABLE-Urgent (Xray Chest 1 View- PORTABLE-Urgent .) (10.13.20 @ 16:34) Clear lungs.          MICROBIOLOGY DATA:    Culture - Surgical Swab (11.12.20 @ 05:01)   Specimen Source: .Surgical Swab Sacral decub   Culture Results:   Few Enterococcus faecium Susceptibility to follow.   Rare Candida albicans "Susceptibilities not performed"     Culture - Sputum . (10.26.20 @ 00:26)   - Ampicillin/Sulbactam: R <=8/4   - Cefazolin: R >16   - Ceftazidime: I 16   - Clindamycin: R >4   - Erythromycin: R >4   - Gentamicin: S <=1 Should not be used as monotherapy   - Levofloxacin: S <=0.5   - Linezolid: S 2   - Oxacillin: R >2   - Penicillin: R >8   - RIF- Rifampin: S <=1 Should not be used as monotherapy   - Tetra/Doxy: S <=1   - Trimethoprim/Sulfamethoxazole: S <=0.5/9.5   - Trimethoprim/Sulfamethoxazole: S <=0.5/9.5   - Vancomycin: S 1       MRSA/MSSA PCR (10.21.20 @ 09:41)   MRSA PCR Result.: Detected:       Culture - Blood (10.20.20 @ 22:09)   Specimen Source: .Blood Blood   Culture Results:  No growth to date.     Culture - Blood (10.20.20 @ 22:09)   Specimen Source: .Blood Blood   Culture Results:   No growth to date.     Culture - Blood in AM (10.16.20 @ 10:13)   Specimen Source: .Blood Blood-Peripheral   Culture Results: No growth to date.     Culture - Blood in AM (10.16.20 @ 10:13)   Specimen Source: .Blood Blood-Peripheral   Culture Results: No growth to date.     Culture - Blood (10.13.20 @ 22:23)   Growth in anaerobic bottle: Gram Negative Rods   Specimen Source: .Blood Blood-Peripheral   Organism: Blood Culture PCR   Culture Results: Growth in anaerobic bottle: Bacteroides fragilis   "Susceptibilities not performed"     Culture - Blood (10.13.20 @ 22:23)   Specimen Source: .Blood Blood-Peripheral   Culture Results:   No growth to date.

## 2020-11-14 NOTE — PROGRESS NOTE ADULT - PROBLEM SELECTOR PLAN 9
Unstageable sacral wound. S/P surgical debridement  final sacral wound culture, grew Enterococcus and Candida   Fluconazole and Linezolid 600 initiated as per ID (Dr Patricio)  Continue wound care as ordered  Turn and position every 2 hours

## 2020-11-14 NOTE — PROGRESS NOTE ADULT - PROBLEM SELECTOR PLAN 1
Secondary to bacteremia.  Sputum positive for MRSA and Stenotrophomas  All antibiotics completed. Secondary to bacteremia.  Sputum positive for MRSA and Stenotrophomas  All antibiotics completed.  WBC TRENDING UP ID TO FOLLOW

## 2020-11-15 NOTE — PROGRESS NOTE ADULT - PROBLEM SELECTOR PLAN 2
Continue midodrine 10mg every 8 hours.  Not able to wean of midodrine at this time  Encourage po intake

## 2020-11-15 NOTE — PROGRESS NOTE ADULT - SUBJECTIVE AND OBJECTIVE BOX
Patient was seen and examined  Patient is a 65y old  Female who presents with a chief complaint of Hypotension (15 Nov 2020 10:18)      INTERVAL HPI/OVERNIGHT EVENTS:  T(C): 36.6 (11-15-20 @ 05:15), Max: 37 (11-14-20 @ 20:41)  HR: 74 (11-15-20 @ 05:15) (74 - 79)  BP: 101/56 (11-15-20 @ 05:15) (101/56 - 114/55)  RR: 17 (11-15-20 @ 05:15) (16 - 18)  SpO2: 100% (11-15-20 @ 05:15) (100% - 100%)  Wt(kg): --  I&O's Summary    14 Nov 2020 07:01  -  15 Nov 2020 07:00  --------------------------------------------------------  IN: 0 mL / OUT: 2000 mL / NET: -2000 mL        LABS:                        8.7    10.92 )-----------( 164      ( 15 Nov 2020 05:05 )             27.0     11-15    145  |  106  |  27<H>  ----------------------------<  69<L>  3.1<L>   |  30  |  1.47<H>    Ca    7.8<L>      15 Nov 2020 05:05  Phos  3.3     11-14  Mg     1.8     11-14    TPro  5.6<L>  /  Alb  2.4<L>  /  TBili  3.8<H>  /  DBili  x   /  AST  105<H>  /  ALT  60  /  AlkPhos  107  11-14        CAPILLARY BLOOD GLUCOSE      POCT Blood Glucose.: 80 mg/dL (15 Nov 2020 06:06)  POCT Blood Glucose.: 106 mg/dL (14 Nov 2020 17:14)  POCT Blood Glucose.: 120 mg/dL (14 Nov 2020 12:04)              MEDICATIONS  (STANDING):  dextrose 5% 1000 milliLiter(s) (50 mL/Hr) IV Continuous <Continuous>  dextrose 5% + sodium chloride 0.45% with potassium chloride 10 mEq/L 100 milliLiter(s) (50 mL/Hr) IV Continuous <Continuous>  fluconAZOLE IVPB 200 milliGRAM(s) IV Intermittent every 24 hours  fluconAZOLE IVPB      furosemide   Injectable 80 milliGRAM(s) IV Push every 12 hours  heparin   Injectable 5000 Unit(s) SubCutaneous every 12 hours  lactulose Syrup 10 Gram(s) Oral daily  midodrine. 10 milliGRAM(s) Oral three times a day  pantoprazole   Suspension 40 milliGRAM(s) Enteral Tube daily  potassium chloride  10 mEq/100 mL IVPB 10 milliEquivalent(s) IV Intermittent once  rifAXIMin 550 milliGRAM(s) Oral two times a day    MEDICATIONS  (PRN):      RADIOLOGY & ADDITIONAL TESTS:    Imaging Personally Reviewed:  [ ] YES  [ ] NO    REVIEW OF SYSTEMS:  CONSTITUTIONAL: No fever, weight loss, or fatigue  EYES: No eye pain, visual disturbances, or discharge  ENMT:  No difficulty hearing, tinnitus, vertigo; No sinus or throat pain  NECK: No pain or stiffness  BREASTS: No pain, masses, or nipple discharge  RESPIRATORY: No cough, wheezing, chills or hemoptysis; No shortness of breath  CARDIOVASCULAR: No chest pain, palpitations, dizziness, or leg swelling  GASTROINTESTINAL: No abdominal or epigastric pain. No nausea, vomiting, or hematemesis; No diarrhea or constipation. No melena or hematochezia.  GENITOURINARY: No dysuria, frequency, hematuria, or incontinence  NEUROLOGICAL: No headaches, memory loss, loss of strength, numbness, or tremors  SKIN: No itching, burning, rashes, or lesions   LYMPH NODES: No enlarged glands  ENDOCRINE: No heat or cold intolerance; No hair loss  MUSCULOSKELETAL: No joint pain or swelling; No muscle, back, or extremity pain  PSYCHIATRIC: No depression, anxiety, mood swings, or difficulty sleeping  HEME/LYMPH: No easy bruising, or bleeding gums  ALLERY AND IMMUNOLOGIC: No hives or eczema      Consultant(s) Notes Reviewed:  [ x ] YES  [ ] NO    PHYSICAL EXAM:  GENERAL: NAD, well-groomed, well-developed  HEAD:  Atraumatic, Normocephalic  EYES: EOMI, PERRLA, conjunctiva and sclera clear  ENMT: No tonsillar erythema, exudates, or enlargement; Moist mucous membranes, Good dentition, No lesions  NECK: Supple, No JVD, Normal thyroid  NERVOUS SYSTEM:  Alert & Oriented X3, Good concentration; Motor Strength 5/5 B/L upper and lower extremities; DTRs 2+ intact and symmetric  CHEST/LUNG: Clear to percussion bilaterally; No rales, rhonchi, wheezing, or rubs  HEART: Regular rate and rhythm; No murmurs, rubs, or gallops  ABDOMEN: Soft, Nontender, Nondistended; Bowel sounds present  EXTREMITIES:  2+ Peripheral Pulses, No clubbing, cyanosis, or edema  LYMPH: No lymphadenopathy noted  SKIN: No rashes or lesions    Care Discussed with Consultants/Other Providers [ x] YES  [ ] NO

## 2020-11-15 NOTE — PROGRESS NOTE ADULT - SUBJECTIVE AND OBJECTIVE BOX
MARIBETH PADILLA    SCU progress note    INTERVAL HPI/OVERNIGHT EVENTS: ***    DNR [ ]   DNI  [  ] Full code    Covid - 19 PCR:     The 4Ms    What Matters Most: see GOC  Age appropriate Medications/Screen for High Risk Medication:  Mentation: Screen for delirium at least every 12 hours: CAM????  Mobility: Screen for mobility limitations        PRESSORS: [ ] YES [ ] NO  fluconAZOLE IVPB 200 milliGRAM(s) IV Intermittent every 24 hours  fluconAZOLE IVPB      rifAXIMin 550 milliGRAM(s) Oral two times a day    Cardiovascular:  Heart Failure  Acute   Acute on Chronic  Chronic       furosemide   Injectable 80 milliGRAM(s) IV Push every 12 hours  midodrine. 10 milliGRAM(s) Oral three times a day    Pulmonary:    Hematalogic:  heparin   Injectable 5000 Unit(s) SubCutaneous every 12 hours    Other:  dextrose 5% 1000 milliLiter(s) IV Continuous <Continuous>  dextrose 5% + sodium chloride 0.45% with potassium chloride 10 mEq/L 100 milliLiter(s) IV Continuous <Continuous>  dextrose 5% + sodium chloride 0.45% with potassium chloride 10 mEq/L 100 milliLiter(s) IV Continuous <Continuous>  lactulose Syrup 10 Gram(s) Oral daily  pantoprazole   Suspension 40 milliGRAM(s) Enteral Tube daily  potassium chloride  10 mEq/100 mL IVPB 10 milliEquivalent(s) IV Intermittent once    dextrose 5% 1000 milliLiter(s) IV Continuous <Continuous>  dextrose 5% + sodium chloride 0.45% with potassium chloride 10 mEq/L 100 milliLiter(s) IV Continuous <Continuous>  dextrose 5% + sodium chloride 0.45% with potassium chloride 10 mEq/L 100 milliLiter(s) IV Continuous <Continuous>  fluconAZOLE IVPB 200 milliGRAM(s) IV Intermittent every 24 hours  fluconAZOLE IVPB      furosemide   Injectable 80 milliGRAM(s) IV Push every 12 hours  heparin   Injectable 5000 Unit(s) SubCutaneous every 12 hours  lactulose Syrup 10 Gram(s) Oral daily  midodrine. 10 milliGRAM(s) Oral three times a day  pantoprazole   Suspension 40 milliGRAM(s) Enteral Tube daily  potassium chloride  10 mEq/100 mL IVPB 10 milliEquivalent(s) IV Intermittent once  rifAXIMin 550 milliGRAM(s) Oral two times a day    Drug Dosing Weight  Height (cm): 167.6 (21 Oct 2020 02:26)  Weight (kg): 56.2 (21 Oct 2020 02:26)  BMI (kg/m2): 20 (21 Oct 2020 02:26)  BSA (m2): 1.63 (21 Oct 2020 02:26)    CENTRAL LINE: [ ] YES [ ] NO  LOCATION:   DATE INSERTED:  REMOVE: [ ] YES [ ] NO  EXPLAIN:    TREJO: [ ] YES [ ] NO    DATE INSERTED:  REMOVE:  [ ] YES [ ] NO  EXPLAIN:    PAST MEDICAL & SURGICAL HISTORY:  Anasarca    Pulmonary embolism    DVT (deep venous thrombosis)    SBO (small bowel obstruction)    H/O exploratory laparotomy                11-14 @ 07:01  -  11-15 @ 07:00  --------------------------------------------------------  IN: 0 mL / OUT: 2000 mL / NET: -2000 mL            PHYSICAL EXAM:    GENERAL: NAD, well-groomed, well-developed  HEAD:  Atraumatic, Normocephalic  EYES: EOMI, PERRLA, conjunctiva and sclera clear  ENMT: No tonsillar erythema, exudates, or enlargement; Moist mucous membranes, Good dentition, No lesions  NECK: Supple, No JVD, Normal thyroid  NERVOUS SYSTEM:  Alert & Oriented X3, Good concentration; Motor Strength 5/5 B/L upper and lower extremities; DTRs 2+ intact and symmetric  CHEST/LUNG: Clear to percussion bilaterally; No rales, rhonchi, wheezing, or rubs  HEART: Regular rate and rhythm; No murmurs, rubs, or gallops  ABDOMEN: Soft, Nontender, Nondistended; Bowel sounds present  EXTREMITIES:  2+ Peripheral Pulses, No clubbing, cyanosis, or edema  LYMPH: No lymphadenopathy noted  SKIN: No rashes or lesions      LABS:  CBC Full  -  ( 15 Nov 2020 05:05 )  WBC Count : 10.92 K/uL  RBC Count : 2.96 M/uL  Hemoglobin : 8.7 g/dL  Hematocrit : 27.0 %  Platelet Count - Automated : 164 K/uL  Mean Cell Volume : 91.2 fl  Mean Cell Hemoglobin : 29.4 pg  Mean Cell Hemoglobin Concentration : 32.2 gm/dL  Auto Neutrophil # : x  Auto Lymphocyte # : x  Auto Monocyte # : x  Auto Eosinophil # : x  Auto Basophil # : x  Auto Neutrophil % : x  Auto Lymphocyte % : x  Auto Monocyte % : x  Auto Eosinophil % : x  Auto Basophil % : x    11-15    145  |  106  |  27<H>  ----------------------------<  69<L>  3.1<L>   |  30  |  1.47<H>    Ca    7.8<L>      15 Nov 2020 05:05  Phos  3.3     11-14  Mg     1.8     11-14    TPro  5.6<L>  /  Alb  2.4<L>  /  TBili  3.8<H>  /  DBili  x   /  AST  105<H>  /  ALT  60  /  AlkPhos  107  11-14              [  ]  DVT Prophylaxis  [  ]  Nutrition, Brand, Rate         Goal Rate         Abdominal Nutritional Status -  Malnutrition   Cachexia      Morbid Obesity BMI >/=40    RADIOLOGY & ADDITIONAL STUDIES:  ***    [  ] Goals of Care Discussion with Family/Proxy/Other       TIME SPENT: 35 minutes MARIBETH PADILLA    SCU progress note    INTERVAL HPI/OVERNIGHT EVENTS: ***    DNR x[ ]   DNI  [  ] Full code    Covid - 19 PCR:     The 4Ms    What Matters Most: see GOC  Age appropriate Medications/Screen for High Risk Medication:  Mentation: Screen for delirium at least every 12 hours: CAM????  Mobility: Screen for mobility limitations        PRESSORS: [ ] YES [ ] NO  fluconAZOLE IVPB 200 milliGRAM(s) IV Intermittent every 24 hours  fluconAZOLE IVPB      rifAXIMin 550 milliGRAM(s) Oral two times a day    Cardiovascular:  Heart Failure  Acute   Acute on Chronic  Chronic       furosemide   Injectable 80 milliGRAM(s) IV Push every 12 hours  midodrine. 10 milliGRAM(s) Oral three times a day    Pulmonary:    Hematalogic:  heparin   Injectable 5000 Unit(s) SubCutaneous every 12 hours    Other:  dextrose 5% 1000 milliLiter(s) IV Continuous <Continuous>  dextrose 5% + sodium chloride 0.45% with potassium chloride 10 mEq/L 100 milliLiter(s) IV Continuous <Continuous>  dextrose 5% + sodium chloride 0.45% with potassium chloride 10 mEq/L 100 milliLiter(s) IV Continuous <Continuous>  lactulose Syrup 10 Gram(s) Oral daily  pantoprazole   Suspension 40 milliGRAM(s) Enteral Tube daily  potassium chloride  10 mEq/100 mL IVPB 10 milliEquivalent(s) IV Intermittent once    dextrose 5% 1000 milliLiter(s) IV Continuous <Continuous>  dextrose 5% + sodium chloride 0.45% with potassium chloride 10 mEq/L 100 milliLiter(s) IV Continuous <Continuous>  dextrose 5% + sodium chloride 0.45% with potassium chloride 10 mEq/L 100 milliLiter(s) IV Continuous <Continuous>  fluconAZOLE IVPB 200 milliGRAM(s) IV Intermittent every 24 hours  fluconAZOLE IVPB      furosemide   Injectable 80 milliGRAM(s) IV Push every 12 hours  heparin   Injectable 5000 Unit(s) SubCutaneous every 12 hours  lactulose Syrup 10 Gram(s) Oral daily  midodrine. 10 milliGRAM(s) Oral three times a day  pantoprazole   Suspension 40 milliGRAM(s) Enteral Tube daily  potassium chloride  10 mEq/100 mL IVPB 10 milliEquivalent(s) IV Intermittent once  rifAXIMin 550 milliGRAM(s) Oral two times a day    Drug Dosing Weight  Height (cm): 167.6 (21 Oct 2020 02:26)  Weight (kg): 56.2 (21 Oct 2020 02:26)  BMI (kg/m2): 20 (21 Oct 2020 02:26)  BSA (m2): 1.63 (21 Oct 2020 02:26)    CENTRAL LINE: [ ] YES x[ ] NO  LOCATION:   DATE INSERTED:  REMOVE: [ ] YES [ ] NO  EXPLAIN:    TREJO: [ x] YES [ ] NO    DATE INSERTED:  REMOVE:  [ ] YES [ ] NO  EXPLAIN:    PAST MEDICAL & SURGICAL HISTORY:  Anasarca    Pulmonary embolism    DVT (deep venous thrombosis)    SBO (small bowel obstruction)    H/O exploratory laparotomy                11-14 @ 07:01  -  11-15 @ 07:00  --------------------------------------------------------  IN: 0 mL / OUT: 2000 mL / NET: -2000 mL            PHYSICAL EXAM:    GENERAL: NAD drowsy  HEAD:  Atraumatic, Normocephalic  NECK: Supple, No JVD, Normal thyroid  NERVOUS SYSTEM:  Alert at times  CHEST/LUNG: decreased breath sounds  HEART: Regular rate and rhythm; No murmurs, rubs, or gallops  ABDOMEN: Soft, Nontender, Nondistended; Bowel sounds present  EXTREMITIES:  2+ Peripheral Pulses, +mild LE edema  SKIN: No rashes or lesions      LABS:  CBC Full  -  ( 15 Nov 2020 05:05 )  WBC Count : 10.92 K/uL  RBC Count : 2.96 M/uL  Hemoglobin : 8.7 g/dL  Hematocrit : 27.0 %  Platelet Count - Automated : 164 K/uL  Mean Cell Volume : 91.2 fl  Mean Cell Hemoglobin : 29.4 pg  Mean Cell Hemoglobin Concentration : 32.2 gm/dL  Auto Neutrophil # : x  Auto Lymphocyte # : x  Auto Monocyte # : x  Auto Eosinophil # : x  Auto Basophil # : x  Auto Neutrophil % : x  Auto Lymphocyte % : x  Auto Monocyte % : x  Auto Eosinophil % : x  Auto Basophil % : x    11-15    145  |  106  |  27<H>  ----------------------------<  69<L>  3.1<L>   |  30  |  1.47<H>    Ca    7.8<L>      15 Nov 2020 05:05  Phos  3.3     11-14  Mg     1.8     11-14    TPro  5.6<L>  /  Alb  2.4<L>  /  TBili  3.8<H>  /  DBili  x   /  AST  105<H>  /  ALT  60  /  AlkPhos  107  11-14              [ x ]  DVT Prophylaxis  [  ]  Nutrition, Brand, Rate         Goal Rate         Abdominal Nutritional Status -  Malnutrition   Cachexia      Morbid Obesity BMI >/=40    RADIOLOGY & ADDITIONAL STUDIES:  ***    [  ] Goals of Care Discussion with Family/Proxy/Other       TIME SPENT: 35 minutes

## 2020-11-15 NOTE — PROGRESS NOTE ADULT - PROBLEM SELECTOR PLAN 1
Secondary to bacteremia.  Sputum positive for MRSA and Stenotrophomas  All antibiotics completed.  WBC TRENDING UP ID TO FOLLOW  IV DIFLUCAN

## 2020-11-15 NOTE — PROGRESS NOTE ADULT - SUBJECTIVE AND OBJECTIVE BOX
Patient is seen and examined at the bed side, is afebrile.   She is doing better. The Leukocytosis trended down to normal. The sacral wound culture grew Enterococcus faecium and Candida albicans, not finalized yet.      REVIEW OF SYSTEMS: All other review systems are negative        ALLERGIES: No Known Allergies        Vital Signs Last 24 Hrs  T(C): 36.6 (15 Nov 2020 05:15), Max: 37 (14 Nov 2020 20:41)  T(F): 97.8 (15 Nov 2020 05:15), Max: 98.6 (14 Nov 2020 20:41)  HR: 74 (15 Nov 2020 05:15) (74 - 79)  BP: 101/56 (15 Nov 2020 05:15) (101/56 - 114/55)  BP(mean): --  RR: 17 (15 Nov 2020 05:15) (16 - 18)  SpO2: 100% (15 Nov 2020 05:15) (100% - 100%)        PHYSICAL EXAM:  GENERAL: Not in distress, on oxygen via NC  CHEST/LUNG: Not using accessory muscles   HEART: s1 and s2 present  ABDOMEN:  Nontender and  Nondistended  EXTREMITIES: B/L UE edematous  CNS: Awake and Alert         LABS:                        8.7    10.92 )-----------( 164      ( 15 Nov 2020 05:05 )             27.0                           9.4    13.14 )-----------( 149      ( 14 Nov 2020 07:58 )             28.7         11-15    145  |  106  |  27<H>  ----------------------------<  69<L>  3.1<L>   |  30  |  1.47<H>    Ca    7.8<L>      15 Nov 2020 05:05  Phos  3.3     11-14  Mg     1.8     11-14    TPro  5.6<L>  /  Alb  2.4<L>  /  TBili  3.8<H>  /  DBili  x   /  AST  105<H>  /  ALT  60  /  AlkPhos  107  11-14 11-12    143  |  105  |  29<H>  ----------------------------<  61<L>  3.0<L>   |  28  |  1.65<H>    Ca    8.2<L>      12 Nov 2020 06:34  Phos  3.3     11-12  Mg     2.2     11-12    TPro  5.2<L>  /  Alb  2.3<L>  /  TBili  5.3<H>  /  DBili  x   /  AST  147<H>  /  ALT  67<H>  /  AlkPhos  89  11-12         11-06    138  |  104  |  37<H>  ----------------------------<  81  3.9   |  25  |  2.37<H>    Ca    8.1<L>      06 Nov 2020 06:20  Phos  3.4     11-06  Mg     2.0     11-06    TPro  5.1<L>  /  Alb  2.8<L>  /  TBili  9.2<H>  /  DBili  x   /  AST  233<H>  /  ALT  99<H>  /  AlkPhos  96  11-06      Vancomycin Level, Trough (11.07.20 @ 21:49)   Vancomycin Level, Trough: 16.1:     Vancomycin Level, Trough (11.07.20 @ 07:08)   Vancomycin Level, Trough: 19.5    vanVancomycin Level, Trough (11.06.20 @ 06:20)   Vancomycin Level, Trough: 19.9:     Vancomycin Level, Trough (11.04.20 @ 06:12)   Vancomycin Level, Trough: 31.0:     Vancomycin Level, Trough (11.03.20 @ 04:11)   Vancomycin Level, Trough: 16.1:        MEDICATIONS  (STANDING):    dextrose 5% 1000 milliLiter(s) (50 mL/Hr) IV Continuous <Continuous>  dextrose 5% + sodium chloride 0.45% with potassium chloride 10 mEq/L 100 milliLiter(s) (50 mL/Hr) IV Continuous <Continuous>  fluconAZOLE IVPB 200 milliGRAM(s) IV Intermittent every 24 hours  fluconAZOLE IVPB      furosemide   Injectable 80 milliGRAM(s) IV Push every 12 hours  heparin   Injectable 5000 Unit(s) SubCutaneous every 12 hours  lactulose Syrup 10 Gram(s) Oral daily  midodrine. 10 milliGRAM(s) Oral three times a day  pantoprazole   Suspension 40 milliGRAM(s) Enteral Tube daily  rifAXIMin 550 milliGRAM(s) Oral two times a day        RADIOLOGY & ADDITIONAL TESTS:    11/4/20 : Xray Chest 1 View- PORTABLE-Routine (Xray Chest 1 View- PORTABLE-Routine in AM.) (11.04.20 @ 10:59) Reexpansion of the left lung with residual left pleural effusion and/or infiltrate.  Right pulmonary edema unchanged.  Tubes and catheters in satisfactory position.      10/25/20: Xray Chest 1 View- PORTABLE-Routine (Xray Chest 1 View- PORTABLE-Routine in AM.) (10.25.20 @ 09:21) Frontal expiratory view of the chest shows the heart to be similar in size. Endotracheal tube, right jugular line and feeding tube remain present.    The lungs show similar left upper lobe infiltrate with progression of right perihilar infiltrate. Pleural effusions are similar. There is no evidence of pneumothorax.      10/23/20 : Xray Chest 1 View-PORTABLE IMMEDIATE (Xray Chest 1 View-PORTABLE IMMEDIATE .) (10.23.20 @ 19:09) Feeding tube tip near GE junction as noted. Questionable right upper lobe infiltrate. Follow-up study is recommended as clinically warranted.      10/21/20 : CT Abdomen and Pelvis w/ Oral Cont and w/ IV Cont (10.21.20 @ 15:59) Mural thickening of the left colon and rectum. Liquid stool in the colon. Apparent segmental mural thickening of the mid to distal small bowel and the proximal duodenum. Findings may represent nonspecific enterocolitis, noninfectious inflammatory bowel disease, or ischemic bowel. Clinical correlation is recommended. The celiac axis artery, SMA, and KINA are patent without stenosis.    Dilatation of the mid small bowel is again noted. Oral contrast has reached the terminal ileum. Findings may represent small bowel obstruction, or ileus related to nonspecific enterocolitis.   Cholelithiasis.    Moderate to large ascites in the abdomen, increased since the previous examination. 5 mm nonspecific noncalcified left upper lobe lung nodule; if the patient's is in the high risk category (i.e. smoker), follow-up chest CT may be pursued in 12 months to ensure stability.    Combination of atelectasis and consolidation in the left lower lobe.    Possible 1.0 cm hypodense lesion in the left lobe of the thyroid. Thyroid ultrasound may be pursued for further evaluation.   Mild bilateral pleural effusions.    Aging determinate compression fracture at T5 vertebra.        10/13/20 : CT Abdomen and Pelvis w/ IV Cont (10.13.20 @ 18:50) Diffuse dilatation of small bowel loops without a clear transition is slightly increased, likely an ileus.  Decreased moderate ascites.    1.5 cm pancreatic versus peripancreatic soft tissue nodule    10/13/20 : Xray Chest 1 View- PORTABLE-Urgent (Xray Chest 1 View- PORTABLE-Urgent .) (10.13.20 @ 16:34) Clear lungs.          MICROBIOLOGY DATA:    Culture - Surgical Swab (11.12.20 @ 05:01)   Specimen Source: .Surgical Swab Sacral decub   Culture Results:   Few Enterococcus faecium Susceptibility to follow.   Rare Candida albicans "Susceptibilities not performed"     Culture - Sputum . (10.26.20 @ 00:26)   - Ampicillin/Sulbactam: R <=8/4   - Cefazolin: R >16   - Ceftazidime: I 16   - Clindamycin: R >4   - Erythromycin: R >4   - Gentamicin: S <=1 Should not be used as monotherapy   - Levofloxacin: S <=0.5   - Linezolid: S 2   - Oxacillin: R >2   - Penicillin: R >8   - RIF- Rifampin: S <=1 Should not be used as monotherapy   - Tetra/Doxy: S <=1   - Trimethoprim/Sulfamethoxazole: S <=0.5/9.5   - Trimethoprim/Sulfamethoxazole: S <=0.5/9.5   - Vancomycin: S 1       MRSA/MSSA PCR (10.21.20 @ 09:41)   MRSA PCR Result.: Detected:       Culture - Blood (10.20.20 @ 22:09)   Specimen Source: .Blood Blood   Culture Results:  No growth to date.     Culture - Blood (10.20.20 @ 22:09)   Specimen Source: .Blood Blood   Culture Results:   No growth to date.     Culture - Blood in AM (10.16.20 @ 10:13)   Specimen Source: .Blood Blood-Peripheral   Culture Results: No growth to date.     Culture - Blood in AM (10.16.20 @ 10:13)   Specimen Source: .Blood Blood-Peripheral   Culture Results: No growth to date.     Culture - Blood (10.13.20 @ 22:23)   Growth in anaerobic bottle: Gram Negative Rods   Specimen Source: .Blood Blood-Peripheral   Organism: Blood Culture PCR   Culture Results: Growth in anaerobic bottle: Bacteroides fragilis   "Susceptibilities not performed"     Culture - Blood (10.13.20 @ 22:23)   Specimen Source: .Blood Blood-Peripheral   Culture Results:   No growth to date.

## 2020-11-15 NOTE — PROGRESS NOTE ADULT - ASSESSMENT

## 2020-11-15 NOTE — PROGRESS NOTE ADULT - PROBLEM SELECTOR PLAN 1
Secondary to bacteremia resolved  Sputum positive for MRSA and Stenotrophomas  restarting above abx for +wound Cx - pt is fluconazole   f/u sensitivity

## 2020-11-16 NOTE — PROGRESS NOTE ADULT - PROBLEM SELECTOR PLAN 1
Secondary to bacteremia. Sputum positive for MRSA and Stenotrophomas  Continue antibiotics as per iD for wound

## 2020-11-16 NOTE — PROGRESS NOTE ADULT - SUBJECTIVE AND OBJECTIVE BOX
Patient was seen and examined at bedside. Full  note to follow. Chief Complaint:  Patient is a 65y old  Female who presents with a chief complaint of Hypotension (16 Nov 2020 10:37)      Reason for consult:  to r/o Budd Chiari Interval Events: Patient was seen and examined at bedside. Remains awake, alert, oriented. She c/o sacral pain. No other new complaints.    Hospital Medications:  dextrose 5% 1000 milliLiter(s) IV Continuous <Continuous>  dextrose 5% + sodium chloride 0.45% with potassium chloride 10 mEq/L 100 milliLiter(s) IV Continuous <Continuous>  dextrose 5% + sodium chloride 0.45% with potassium chloride 10 mEq/L 100 milliLiter(s) IV Continuous <Continuous>  fluconAZOLE IVPB 200 milliGRAM(s) IV Intermittent every 24 hours  fluconAZOLE IVPB      furosemide   Injectable 40 milliGRAM(s) IV Push two times a day  heparin   Injectable 5000 Unit(s) SubCutaneous every 12 hours  lactulose Syrup 10 Gram(s) Oral daily  midodrine. 10 milliGRAM(s) Oral three times a day  pantoprazole   Suspension 40 milliGRAM(s) Enteral Tube daily  rifAXIMin 550 milliGRAM(s) Oral two times a day      ROS:   General:  No  fevers, chills  Eyes:  No vision problem reported  ENT:  No sore throat, pain, runny nose  CV:  No pain, palpitations  Pulm:  No cough  GI:  Denies abdominal pain, N/V, BM?  :  No  dysuria  Muscle:  bedbound  Neuro:  awake, alert, oriented    PHYSICAL EXAM:   Vital Signs:  Vital Signs Last 24 Hrs  T(C): 37.2 (16 Nov 2020 04:50), Max: 37.7 (15 Nov 2020 20:18)  T(F): 98.9 (16 Nov 2020 04:50), Max: 99.9 (15 Nov 2020 20:18)  HR: 78 (16 Nov 2020 11:35) (68 - 84)  BP: 102/63 (16 Nov 2020 11:35) (102/63 - 153/70)  BP(mean): --  RR: 16 (16 Nov 2020 04:50) (16 - 17)  SpO2: 100% (16 Nov 2020 11:35) (98% - 100%)  Daily     Daily     GENERAL: no acute distress; ill appearing, but overall improved  NEURO: awake, alert, oriented; cannot cooperate to assess asterixis  HEENT: icteric sclera  CHEST: no respiratory distress, no accessory muscle use  CARDIAC: regular rate, rhythm  ABDOMEN: soft, non-tender, non-distended, no rebound or guarding, prior surgical scars  EXTREMITIES: warm, well perfused, no edema    LABS: reviewed                        8.2    12.15 )-----------( 192      ( 16 Nov 2020 05:54 )             25.9     11-16    145  |  106  |  31<H>  ----------------------------<  82  3.1<L>   |  30  |  1.69<H>    Ca    8.1<L>      16 Nov 2020 05:54  Phos  3.4     11-16  Mg     1.7     11-16          Interval Diagnostic Studies: see sunrise for full report

## 2020-11-16 NOTE — PROGRESS NOTE ADULT - SUBJECTIVE AND OBJECTIVE BOX
MARIBETH PADILLA    SCU progress note    INTERVAL HPI/OVERNIGHT EVENTS: ***No overnights events    DNR [x ]   DNI  [  ]   TRIAL OF INTUBATION    Covid - 19 PCR: Negative 10/13    The 4Ms    What Matters Most: see [facility]  Age appropriate Medications/Screen for High Risk Medication: Yes  Mentation: see CAM below  Mobility: defer to physical exam    The Confusion Assessment Method (CAM) Diagnostic Algorithm     1: Acute Onset or Fluctuating Course  - Is there evidence of an acute change in mental status from the patient’s baseline? Did the (abnormal) behavior  fluctuate during the day, that is, tend to come and go, or increase and decrease in severity?  [ ] YES [x ] NO     2: Inattention  - Did the patient have difficulty focusing attention, being easily distractible, or having difficulty keeping track of what was being said?  [ ] YES [ ] NO  At baseline     3: Disorganized thinking  -Was the patient’s thinking disorganized or incoherent, such as rambling or irrelevant conversation, unclear or illogical flow of ideas, or unpredictable switching from subject to subject?  [ ] YES [ ] NO  Unable to access    4: Altered Level of consciousness?  [ ] YES [x ] NO    The diagnosis of delirium by CAM requires the presence of features 1 and 2 and either 3 or 4.    PRESSORS: [ ] YES [x ] NO  fluconAZOLE IVPB 200 milliGRAM(s) IV Intermittent every 24 hours  fluconAZOLE IVPB      rifAXIMin 550 milliGRAM(s) Oral two times a day    Cardiovascular:  Heart Failure  Acute   Acute on Chronic  Chronic       furosemide   Injectable 40 milliGRAM(s) IV Push two times a day  midodrine. 10 milliGRAM(s) Oral three times a day    Pulmonary:    Hematalogic:  heparin   Injectable 5000 Unit(s) SubCutaneous every 12 hours    Other:  dextrose 5% 1000 milliLiter(s) IV Continuous <Continuous>  dextrose 5% + sodium chloride 0.45% with potassium chloride 10 mEq/L 100 milliLiter(s) IV Continuous <Continuous>  dextrose 5% + sodium chloride 0.45% with potassium chloride 10 mEq/L 100 milliLiter(s) IV Continuous <Continuous>  lactulose Syrup 10 Gram(s) Oral daily  pantoprazole   Suspension 40 milliGRAM(s) Enteral Tube daily    dextrose 5% 1000 milliLiter(s) IV Continuous <Continuous>  dextrose 5% + sodium chloride 0.45% with potassium chloride 10 mEq/L 100 milliLiter(s) IV Continuous <Continuous>  dextrose 5% + sodium chloride 0.45% with potassium chloride 10 mEq/L 100 milliLiter(s) IV Continuous <Continuous>  fluconAZOLE IVPB 200 milliGRAM(s) IV Intermittent every 24 hours  fluconAZOLE IVPB      furosemide   Injectable 40 milliGRAM(s) IV Push two times a day  heparin   Injectable 5000 Unit(s) SubCutaneous every 12 hours  lactulose Syrup 10 Gram(s) Oral daily  midodrine. 10 milliGRAM(s) Oral three times a day  pantoprazole   Suspension 40 milliGRAM(s) Enteral Tube daily  rifAXIMin 550 milliGRAM(s) Oral two times a day    Drug Dosing Weight  Height (cm): 167.6 (21 Oct 2020 02:26)  Weight (kg): 56.2 (21 Oct 2020 02:26)  BMI (kg/m2): 20 (21 Oct 2020 02:26)  BSA (m2): 1.63 (21 Oct 2020 02:26)    CENTRAL LINE: [ ] YES [x ] NO  LOCATION:   DATE INSERTED:  REMOVE: [ ] YES [ ] NO  EXPLAIN:    TREJO: [x ] YES [ ] NO    DATE INSERTED:  REMOVE:  [ ] YES [x ] NO  EXPLAIN:   Decubitus      PAST MEDICAL & SURGICAL HISTORY:  Anasarca    Pulmonary embolism    DVT (deep venous thrombosis)    SBO (small bowel obstruction)    H/O exploratory laparotomy                11-15 @ 07:01  -  11-16 @ 07:00  --------------------------------------------------------  IN: 0 mL / OUT: 1100 mL / NET: -1100 mL            PHYSICAL EXAM:    GENERAL: NAD, cachectic  HEAD:  Atraumatic, Normocephalic  EYES: EOMI, PERRLA, conjunctiva and sclera clear  ENMT: No tonsillar erythema, exudates  NECK: Supple, No JVD  NERVOUS SYSTEM:  Awake and alert. Can follow simple commands  CHEST/LUNG: Clear to percussion bilaterally; No rales, rhonchi, wheezing, or rubs  HEART: Regular rate and rhythm; No murmurs, rubs, or gallops  ABDOMEN: Soft, Nontender, Nondistended; Bowel sounds present  EXTREMITIES:  2+ Peripheral Pulses, No clubbing, cyanosis, or edema  LYMPH: No lymphadenopathy noted  SKIN: Sacral decubitus       LABS:  CBC Full  -  ( 16 Nov 2020 05:54 )  WBC Count : 12.15 K/uL  RBC Count : 2.82 M/uL  Hemoglobin : 8.2 g/dL  Hematocrit : 25.9 %  Platelet Count - Automated : 192 K/uL  Mean Cell Volume : 91.8 fl  Mean Cell Hemoglobin : 29.1 pg  Mean Cell Hemoglobin Concentration : 31.7 gm/dL  Auto Neutrophil # : x  Auto Lymphocyte # : x  Auto Monocyte # : x  Auto Eosinophil # : x  Auto Basophil # : x  Auto Neutrophil % : x  Auto Lymphocyte % : x  Auto Monocyte % : x  Auto Eosinophil % : x  Auto Basophil % : x    11-16    145  |  106  |  31<H>  ----------------------------<  82  3.1<L>   |  30  |  1.69<H>    Ca    8.1<L>      16 Nov 2020 05:54  Phos  3.4     11-16  Mg     1.7     11-16                [  ]  DVT Prophylaxis  [  ]  Nutrition, Brand, Rate         Goal Rate        Abnormal Nutritional Status -  Malnutrition   Cachexia         RADIOLOGY & ADDITIONAL STUDIES:  ***  < from: Xray Chest 1 View- PORTABLE-Routine (Xray Chest 1 View- PORTABLE-Routine in AM.) (11.11.20 @ 08:16) >  The lung apices are partially obscured by the patient's head and chin.    Right central venous catheter is unchanged in position.  Nasogastric tube is been removed.    The evaluation of the cardiomediastinal silhouette is limited on portable technique.    There are persistent bilateral pleural effusions with underlying airspace consolidation.    < end of copied text >    `< from: Transthoracic Echocardiogram (10.27.20 @ 09:56) >  Mitral Valve: Normal mitral valve. Trace mitral  regurgitation.  Aortic Root: Normal aortic root.  Aortic Valve: Normal trileaflet aortic valve. Trace aortic  regurgitation.  Left Atrium: LA volume index = 13 cc/m2.  LeftVentricle: Severe global left ventricular systolic  dysfunction. Normal left ventricular internal dimensions  and wall thicknesses.  Right Heart: Normal right atrium. Right ventricular  enlargement with decreased RV systolic function. There is  moderate tricuspid regurgitation. There is trace pulmonic  regurgitation.  Pericardium/PleuraNormal pericardium with no pericardial  effusion. Bilateral pleural effusions.  Hemodynamic: RA Pressure is 8 mm Hg. RV systolic pressure  is 35 mm Hg.  ------------------------------------------------------------------------  CONCLUSIONS:  1. Trace mitral regurgitation.  2.  Trace aortic regurgitation.  3. Normal left ventricular internal dimensions and wall  thicknesses.  4. Severe global left ventricular systolic dysfunction.  5. Right ventricular enlargement with decreased RV systolic  function.  6. Bilateral pleural effusions.    < end of copied text >    Goals of Care Discussion with Family/Proxy/Other   - see note from/family meeting set up for...

## 2020-11-16 NOTE — PROGRESS NOTE ADULT - PROBLEM SELECTOR PLAN 8
Probably secondary to portal hypertension.   LFTs improving. Monitor daily  Continue rifaximin  Hepatology following.

## 2020-11-16 NOTE — PROGRESS NOTE ADULT - ASSESSMENT
Hospital course:  64 yo Female with Hx of recurrent SBO`s s/p exp lap, small bowel resection in 2015, ex lap, ALBINA in 2018, DVT, PE (was on Xarelto), IVC filter and s/p supra-renal IVC thrombosis/occlusion, anemia, anasarca was sent to ED by PCP for hypotension, generalized weakness and chills and was admitted on 10/13/2020 for hypotension, r/o sepsis.   She was found to have Bacteroides fragilis bacteremia, suspected GI source, was treated with cefepime + metronidazole, remained hypothermic, hypotensive, was transferred to ICU and switched to meropenem on 10/21. Repeat CT 10/21 suggested  non-specific enterocolitis, noninfectious inflammatory bowel disease or ischemic bowel along with ileus, and concern for SBO given mild luminal narrowing at distal small bowel. Patient remained hypotensive, hypothermic, requiring pressor support, septic and became encephalopathic and got intubated on 10/24. She was also diagnosed with HCAP vs. aspirational PNA, sputum growing MRSA and Stenotrophomonas maltophilia.  She was switched to levofloxacin on 10/27 and vancomycin was added on 10/31. She completed antibiotics on 11/11.     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 Further evaluation  / definite diagnosis of Budd Chiari was not feasible due to the patient overall condition / comorbidities.  Elevated liver enzymes were thought partially due to ongoing sepsis. They were only mildly elevated or normal on admission and started to worsen when patient condition deteriorated, became hypotensive, hypothermic and was transferred to ICU. Patient also developed  MOF, with severely reduced LVEF (10-15%) and RV dysfunction, thus congestion could have also contributed to the abnormal liver tests.  Peak liver enzymes were peak  ,  ,  ALP  316, and  Se bi 9.2. Liver tests improved with sepsis management and improvement of fluid status.  Now , ALT 60, , and Se bi 3.8. They have not normalized yet, but continued to improve.      Not clear whether it was caused only by the sepsis, MOF, congestion and possibly drug induced component (multiple antibiotics), or  there is indeed component of vascular disease.  No caudate lobe hypertrophy was reported (present in 75% of Budd Chiari cases), no macroregenerative nodules were  reported, characteristic pattern of parenchymal perfusion was not described, but study was reported limited due to motion artifact and the gold standard for diagnosis would be venography. It was postponed / was not feasible (required transfer to [facility]) b/o the patient overall condition. Dx paracentesis was not feasible during this admission as d/w IR. However, prior ascites analysis from 2019 with ascites albumin< 0.2, protein < 1; serum albumin 1.0; SAAG< 1.1 (early Budd Chiari would usually give SAAG > 1.1, total protein > 2.5, and in late Budd Chiari SAAG> 1.1 and total protein < 2.5). SAAG < 1.1 with low protein could have been due to bowel obstruction/infarction, serositis among others. Now patient improved, but the question whether there would be therapeutic consequence of further workup at present. Patient is not transplant and not TIPS candidate (EF 10-15%).  Patient has already been on full dose anticoagulation, but was discontinued because of coagulopathy, thrombocytopenia, now only on DVT ppx dose.  Prior liver workup: HBsAg neg, HBcAb IgM neg, HCV ab neg,  HAV IgM neg; KATHY neg; EBV, HSV past infection; CMV PCR neg; Hep E or rest of AI workup has not got sent out.  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 (as per d/w primary team, it was non-conclusive); hematology appreciated  - Venography would be gold standard for diagnosis, discussed earlier with IR, if patient will be more stable (possibly outpatient), can be evaluated at [facility] for Dx and potential intervention  - However, now with newly developed heart failure (EF 10-15%), thus not transplant or TIPS candidate  - Was already on full dose anticoagulation, was being held b/o coagulopathy and b/o severe thrombocytopenia, AC per primary team/hem (currently on DVT ppx)  - now patient is more cooperative, can consider to attempt another Doppler US and repeat TTE    # Abnormal liver enzymes with hyperbilirubinemia (improving) and coagulopathy (resolved) (labs from 11/14)  # Ascites  - possibly was combination of sepsis, HF and maybe drug induced component (multiple antibiotics); but cannot fully rule out that additional etiology, including vascular disease as above  - if no further improvement can consider additional workup  - mgmt. of CHF per primary team    # Encephalopathy, possibly was multifactorial - resolved  - c/w lactulose to titrate 2-3 BM/day (hold if diarrhea) and c/w rifaximin   - CT head was not done b/o lac-k of focal signs    Will continue to follow  D/w primary team  Thank you for the consult

## 2020-11-16 NOTE — PROGRESS NOTE ADULT - SUBJECTIVE AND OBJECTIVE BOX
condition stable  alert, no fever, sob, BP stable    ROS:  Negative except for:    MEDICATIONS  (STANDING):  dextrose 5% 1000 milliLiter(s) (50 mL/Hr) IV Continuous <Continuous>  dextrose 5% + sodium chloride 0.45% with potassium chloride 10 mEq/L 100 milliLiter(s) (50 mL/Hr) IV Continuous <Continuous>  dextrose 5% + sodium chloride 0.45% with potassium chloride 10 mEq/L 100 milliLiter(s) (50 mL/Hr) IV Continuous <Continuous>  fluconAZOLE IVPB 200 milliGRAM(s) IV Intermittent every 24 hours  fluconAZOLE IVPB      furosemide   Injectable 40 milliGRAM(s) IV Push two times a day  heparin   Injectable 5000 Unit(s) SubCutaneous every 12 hours  lactulose Syrup 10 Gram(s) Oral daily  midodrine. 10 milliGRAM(s) Oral three times a day  pantoprazole   Suspension 40 milliGRAM(s) Enteral Tube daily  rifAXIMin 550 milliGRAM(s) Oral two times a day    MEDICATIONS  (PRN):      Allergies    No Known Allergies    Intolerances        Vital Signs Last 24 Hrs  T(C): 37.2 (16 Nov 2020 04:50), Max: 37.7 (15 Nov 2020 20:18)  T(F): 98.9 (16 Nov 2020 04:50), Max: 99.9 (15 Nov 2020 20:18)  HR: 68 (16 Nov 2020 04:50) (68 - 84)  BP: 109/61 (16 Nov 2020 04:50) (104/61 - 153/70)  BP(mean): --  RR: 16 (16 Nov 2020 04:50) (16 - 17)  SpO2: 100% (16 Nov 2020 04:50) (98% - 100%)    PHYSICAL EXAM  General: adult in NAD  HEENT: clear oropharynx, anicteric sclera, pink conjunctiva  Neck: supple  CV: normal S1/S2 with no murmur rubs or gallops  Lungs: positive air movement b/l ant lungs,clear to auscultation, no wheezes, no rales  Abdomen: soft non-tender non-distended, no hepatosplenomegaly  Ext: no clubbing cyanosis or edema  Skin: no rashes and no petechiae  Neuro: alert and oriented X 4, no focal deficits  LABS:                          8.2    12.15 )-----------( 192      ( 16 Nov 2020 05:54 )             25.9         Mean Cell Volume : 91.8 fl  Mean Cell Hemoglobin : 29.1 pg  Mean Cell Hemoglobin Concentration : 31.7 gm/dL  Auto Neutrophil # : x  Auto Lymphocyte # : x  Auto Monocyte # : x  Auto Eosinophil # : x  Auto Basophil # : x  Auto Neutrophil % : x  Auto Lymphocyte % : x  Auto Monocyte % : x  Auto Eosinophil % : x  Auto Basophil % : x    Serial CBC  Hematocrit 25.9  Hemoglobin 8.2  Plat 192  RBC 2.82  WBC 12.15  Serial CBC  Hematocrit 27.0  Hemoglobin 8.7  Plat 164  RBC 2.96  WBC 10.92  Serial CBC  Hematocrit 28.7  Hemoglobin 9.4  Plat 149  RBC 3.19  WBC 13.14  Serial CBC  Hematocrit 27.1  Hemoglobin 9.0  Plat 137  RBC 3.03  WBC 10.54  Serial CBC  Hematocrit 27.1  Hemoglobin 9.0  Plat 137  RBC 3.05  WBC 11.69    11-16    145  |  106  |  31<H>  ----------------------------<  82  3.1<L>   |  30  |  1.69<H>    Ca    8.1<L>      16 Nov 2020 05:54  Phos  3.4     11-16  Mg     1.7     11-16                      BLOOD SMEAR INTERPRETATION:       RADIOLOGY & ADDITIONAL STUDIES:

## 2020-11-16 NOTE — PROGRESS NOTE ADULT - ASSESSMENT
65y Female from home, lives with daughter, ambulates with walker with PMH of recurrent SBO's, s/p exp lap, SB resection in 2015, ex lap, ALBINA in 2018, DVT, PE, on Xarelto, IVC filter, chronic leg swelling, and anasarca, came in to the ED due to hypotension during office visit. Patient was found to be bacteremic with bacteroids fragilis. Admitted in ICU due to hypotension and hypothermia. patient completed course of antibiotics . BCx X2 negative. Ammonia level elevated and patient refused NGT placement, mental status deteriorated and patient desaturated to high 70%, patient got intubated on 10/24 . Sputum was positive for MRSA and Stenotrophomonas. patient started on vancomycin and Levaquin. patient was given lactulose for high ammonia level. kidney function and liver function started to deteriorate and patient was leaning toward multi organ failure . high dose of Lasix started and patient was aggressively diuresed. Kidney function improved . Ammonia level dropped to less than 10.  Mental status improved and patient extubated on 11/3/2020. Patient was seen by wound specialist and decubitus ulcer debrided. patient had a drop in h/h s/p debridement . received 1 unit PRBC .   11/12  Transferred from ICU to SCU      DECREASE LASIX 40 BID

## 2020-11-16 NOTE — PROGRESS NOTE ADULT - ASSESSMENT
Patient is a 65y old  Female from home, lives with daughter, ambulates with walker with PMH of recurrent SBO's, s/p exp lap, SB resection in 2015, ex lap, ALBINA in 2018, DVT, PE, on Xarelto, IVC filter, chronic leg swelling, and anasarca, Now send in to the ER by her PCP, Dr. Ross, for evaluation of  generalized weakness and hypotension BP of 80/40 during office visit. On admission, she found to have no fever and BP was in lower normal range and no Leukocytosis. The CXR is clear and CT abd/pelvis consistent with Ileus. The ID consult requested to assist with evaluation of infectious etiology of episode of hypotension. Found to have Bacteroides bacteremia and now developed septic shock on Cefepime and Flagyl. Hence transferred to ICU. 10/20/20.    # Hypotension  at office- BP of 80/40 - resolved   #  Bacteroides fragilis Bacteremia - 10/13/20 - ? source most likely GI- Repeat Blood Cxs have NGTD 10/16/20  # Ileus  - h/o recurrent SBO and s/p EXp. LAp  # COVID 19 negative   # Septic shock ( Hypothermia + hypotensive)- transferred to ICU since requiring pressor- source GI, The CT abd/pelvis shows nonspecific enterocolitis, noninfectious inflammatory bowel disease, or ischemic bowel, along with ileus.   # Pneumonia- HCAP vs Aspiration - on CXR 10/24 and CT chest 10/21- sputum Cx grew Methicillin resistant Staphylococcus aureus  and Stenotrophomonas maltophilia.  # S/p extubated 11/9/20  # Infected sacral ulcer- s/p debridement and culture grew Enterococcus and Candida, sensitivity is pending      would recommend:    1. Follow up final sacral wound culture, grew Enterococcus, sensitivity is pending and adjust Abx accordingly   2. Continue Fluconazole to cover Candida in wound culture  3. Monitor kidney function, is improving    4. Aspiration precaution  5. Continue Sacral Wound care  and Frequent Repositioning     d/w covering NP    Attending Attestation:    Spent more than 45 minutes on total encounter, more than 50 % of the visit was spent counseling and/or coordinating care by the Attending physician.   Patient is a 65y old  Female from home, lives with daughter, ambulates with walker with PMH of recurrent SBO's, s/p exp lap, SB resection in 2015, ex lap, ALBINA in 2018, DVT, PE, on Xarelto, IVC filter, chronic leg swelling, and anasarca, Now send in to the ER by her PCP, Dr. Ross, for evaluation of  generalized weakness and hypotension BP of 80/40 during office visit. On admission, she found to have no fever and BP was in lower normal range and no Leukocytosis. The CXR is clear and CT abd/pelvis consistent with Ileus. The ID consult requested to assist with evaluation of infectious etiology of episode of hypotension. Found to have Bacteroides bacteremia and now developed septic shock on Cefepime and Flagyl. Hence transferred to ICU. 10/20/20.    # Hypotension  at office- BP of 80/40 - resolved   #  Bacteroides fragilis Bacteremia - 10/13/20 - ? source most likely GI- Repeat Blood Cxs have NGTD 10/16/20  # Ileus  - h/o recurrent SBO and s/p EXp. LAp  # COVID 19 negative   # Septic shock ( Hypothermia + hypotensive)- transferred to ICU since requiring pressor- source GI, The CT abd/pelvis shows nonspecific enterocolitis, noninfectious inflammatory bowel disease, or ischemic bowel, along with ileus.   # Pneumonia- HCAP vs Aspiration - on CXR 10/24 and CT chest 10/21- sputum Cx grew Methicillin resistant Staphylococcus aureus  and Stenotrophomonas maltophilia.  # S/p extubated 11/9/20  # Infected sacral ulcer- s/p debridement and culture grew Enterococcus and Candida, sensitivity is pending      would recommend:    1. Start on Iv Daptomycin to cover VRE since  patient has major D-D interaction with Midodrine.   2. Continue Fluconazole to cover Candida in wound culture  3. Monitor kidney function, is improving    4. Aspiration precaution  5. Continue Sacral Wound care  and Frequent Repositioning   6. Monitor WBC count     d/w covering NP    Attending Attestation:    Spent more than 45 minutes on total encounter, more than 50 % of the visit was spent counseling and/or coordinating care by the Attending physician.

## 2020-11-16 NOTE — PROGRESS NOTE ADULT - SUBJECTIVE AND OBJECTIVE BOX
Patient was seen and examined  Patient is a 65y old  Female who presents with a chief complaint of Hypotension (15 Nov 2020 11:36)      INTERVAL HPI/OVERNIGHT EVENTS:  T(C): 37.2 (11-16-20 @ 04:50), Max: 37.7 (11-15-20 @ 20:18)  HR: 68 (11-16-20 @ 04:50) (68 - 84)  BP: 109/61 (11-16-20 @ 04:50) (104/61 - 153/70)  RR: 16 (11-16-20 @ 04:50) (16 - 17)  SpO2: 100% (11-16-20 @ 04:50) (98% - 100%)  Wt(kg): --  I&O's Summary    15 Nov 2020 07:01  -  16 Nov 2020 07:00  --------------------------------------------------------  IN: 0 mL / OUT: 1100 mL / NET: -1100 mL    16 Nov 2020 07:01  -  16 Nov 2020 08:04  --------------------------------------------------------  IN: 0 mL / OUT: 700 mL / NET: -700 mL        LABS:                        8.2    12.15 )-----------( 192      ( 16 Nov 2020 05:54 )             25.9     11-16    145  |  106  |  31<H>  ----------------------------<  82  3.1<L>   |  30  |  1.69<H>    Ca    8.1<L>      16 Nov 2020 05:54  Phos  3.4     11-16  Mg     1.7     11-16          CAPILLARY BLOOD GLUCOSE      POCT Blood Glucose.: 93 mg/dL (16 Nov 2020 05:52)  POCT Blood Glucose.: 98 mg/dL (16 Nov 2020 00:08)  POCT Blood Glucose.: 130 mg/dL (15 Nov 2020 16:59)  POCT Blood Glucose.: 93 mg/dL (15 Nov 2020 11:33)              MEDICATIONS  (STANDING):  dextrose 5% 1000 milliLiter(s) (50 mL/Hr) IV Continuous <Continuous>  dextrose 5% + sodium chloride 0.45% with potassium chloride 10 mEq/L 100 milliLiter(s) (50 mL/Hr) IV Continuous <Continuous>  dextrose 5% + sodium chloride 0.45% with potassium chloride 10 mEq/L 100 milliLiter(s) (50 mL/Hr) IV Continuous <Continuous>  fluconAZOLE IVPB 200 milliGRAM(s) IV Intermittent every 24 hours  fluconAZOLE IVPB      furosemide   Injectable 80 milliGRAM(s) IV Push every 12 hours  heparin   Injectable 5000 Unit(s) SubCutaneous every 12 hours  lactulose Syrup 10 Gram(s) Oral daily  midodrine. 10 milliGRAM(s) Oral three times a day  pantoprazole   Suspension 40 milliGRAM(s) Enteral Tube daily  rifAXIMin 550 milliGRAM(s) Oral two times a day    MEDICATIONS  (PRN):      RADIOLOGY & ADDITIONAL TESTS:    Imaging Personally Reviewed:  [ ] YES  [ ] NO    REVIEW OF SYSTEMS:  CONSTITUTIONAL: No fever, weight loss, or fatigue  EYES: No eye pain, visual disturbances, or discharge  ENMT:  No difficulty hearing, tinnitus, vertigo; No sinus or throat pain  NECK: No pain or stiffness  BREASTS: No pain, masses, or nipple discharge  RESPIRATORY: No cough, wheezing, chills or hemoptysis; No shortness of breath  CARDIOVASCULAR: No chest pain, palpitations, dizziness, or leg swelling  GASTROINTESTINAL: No abdominal or epigastric pain. No nausea, vomiting, or hematemesis; No diarrhea or constipation. No melena or hematochezia.  GENITOURINARY: No dysuria, frequency, hematuria, or incontinence  NEUROLOGICAL: No headaches, memory loss, loss of strength, numbness, or tremors  SKIN: No itching, burning, rashes, or lesions   LYMPH NODES: No enlarged glands  ENDOCRINE: No heat or cold intolerance; No hair loss  MUSCULOSKELETAL: No joint pain or swelling; No muscle, back, or extremity pain  PSYCHIATRIC: No depression, anxiety, mood swings, or difficulty sleeping  HEME/LYMPH: No easy bruising, or bleeding gums  ALLERY AND IMMUNOLOGIC: No hives or eczema      Consultant(s) Notes Reviewed:  [x] YES  [ ] NO    PHYSICAL EXAM:  GENERAL: NAD, well-groomed, well-developed  HEAD:  Atraumatic, Normocephalic  EYES: EOMI, PERRLA, conjunctiva and sclera clear  ENMT: No tonsillar erythema, exudates, or enlargement; Moist mucous membranes, Good dentition, No lesions  NECK: Supple, No JVD, Normal thyroid  NERVOUS SYSTEM:  Alert & Oriented X3, Good concentration; Motor Strength 5/5 B/L upper and lower extremities; DTRs 2+ intact and symmetric  CHEST/LUNG: Clear to percussion bilaterally; No rales, rhonchi, wheezing, or rubs  HEART: Regular rate and rhythm; No murmurs, rubs, or gallops  ABDOMEN: Soft, Nontender, Nondistended; Bowel sounds present  EXTREMITIES:  2+ Peripheral Pulses, No clubbing, cyanosis, or edema  LYMPH: No lymphadenopathy noted  SKIN: No rashes or lesions    Care Discussed with Consultants/Other Providers [ x] YES  [ ] NO

## 2020-11-16 NOTE — PROGRESS NOTE ADULT - NUTRITIONAL ASSESSMENT
This patient has been assessed with a concern for Malnutrition and has been determined to have a diagnosis/diagnoses of Severe protein-calorie malnutrition.  Protein 5.6    Albumin  2.4    This patient is being managed with:   Diet Dysphagia 1 Pureed-Nectar Consistency Fluid-  Supplement Feeding Modality:  Oral  Two Papi HN Cans or Servings Per Day:  3       Frequency:  Three Times a day  Ensure Pudding Cans or Servings Per Day:  2       Frequency:  Two Times a day  Entered: Nov 13 2020  1:47PM

## 2020-11-16 NOTE — CHART NOTE - NSCHARTNOTEFT_GEN_A_CORE
Palliative Care Note:  Spoke with patient's daughter/Jus Bucio ([phone]) on the phone. Family meeting planned for Wednesday 11/18 around 2:30 to further discuss status, goals of care and d/c planning. ICU attending, SCU NP and PC SW aware.

## 2020-11-17 NOTE — PROGRESS NOTE ADULT - PROBLEM SELECTOR PLAN 7
Encourage oral intake.  Oral intake remains poor.  Assist with feedings.  Continue 2Cal HN and ensure pudding

## 2020-11-17 NOTE — PROGRESS NOTE ADULT - ASSESSMENT

## 2020-11-17 NOTE — PROGRESS NOTE ADULT - ASSESSMENT
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  BC grew bacteroides.    Problem/Recommendation - 1:  Problem: Bacteremia. Recommendation: bacteroides  most likely GI origin  on antibiotics  she had hypothermia and hypotension and elevated lactic acid  in ICU now, she is extubated today, bp is more stable  multiorgan failure but improving  christel is trending down now, cholestasis, etiology?  Cr continue to trend down slowly,  may be due to poor perfusion  Problem/Recommendation - 2:·  Problem: Anemia.  Recommendation: ferritin, b12 folate normalno hemolysis  most likley due to malnutrition from short bowel syndrome.she also had GIB multiple times before. Problem/Recommendation - 3:·  Problem: Acute deep vein thrombosis (DVT) of other vein of upper extremity.  Recommendation: she has been on xarelto for 2 years.  DVT at left arm and left leg before.  in all CT scan i did not see report of IVC or hepatic vein thrombosisoff xarelto and lovenox due to elevated PT/PTT. elevated PT/PTT due to malnutrition and antibiotics causing VITK def  now it is mostly recovered  it begins to rise again, correctable by mixing study  probably due to liver failure this time  she develops DVT very quickly while off ACneed DVT prophylaxis,   can resume lovenox

## 2020-11-17 NOTE — PROGRESS NOTE ADULT - SUBJECTIVE AND OBJECTIVE BOX
Patient is seen and examined at the bed side, is afebrile.   She is doing better. The WBC count is trending back down. The sacral wound culture grew Enterococcus faecium, VRE and only sensitive to Linezolid but patient has major D-D interaction with Midodrine.       REVIEW OF SYSTEMS: All other review systems are negative        ALLERGIES: No Known Allergies        Vital Signs Last 24 Hrs  T(C): 37.1 (17 Nov 2020 14:24), Max: 37.7 (16 Nov 2020 20:13)  T(F): 98.8 (17 Nov 2020 14:24), Max: 99.9 (16 Nov 2020 20:13)  HR: 77 (17 Nov 2020 14:24) (77 - 86)  BP: 124/70 (17 Nov 2020 14:24) (111/53 - 124/70)  BP(mean): --  RR: 16 (17 Nov 2020 14:24) (16 - 16)  SpO2: 96% (17 Nov 2020 14:24) (96% - 100%)        PHYSICAL EXAM:  GENERAL: Not in distress, on oxygen via NC  CHEST/LUNG: Not using accessory muscles   HEART: s1 and s2 present  ABDOMEN:  Nontender and  Nondistended  EXTREMITIES: B/L UE edematous  CNS: Awake and Alert         LABS:                        8.3    10.77 )-----------( 208      ( 17 Nov 2020 10:30 )             26.4                           8.2    12.15 )-----------( 192      ( 16 Nov 2020 05:54 )             25.9                         9.4    13.14 )-----------( 149      ( 14 Nov 2020 07:58 )             28.7         11-17    147<H>  |  108  |  35<H>  ----------------------------<  74  3.1<L>   |  32<H>  |  1.58<H>    Ca    7.6<L>      17 Nov 2020 10:30  Phos  3.3     11-17  Mg     1.7     11-17 11-16    145  |  106  |  31<H>  ----------------------------<  82  3.1<L>   |  30  |  1.69<H>    Ca    8.1<L>      16 Nov 2020 05:54  Phos  3.4     11-16  Mg     1.7     11-16 11-06    138  |  104  |  37<H>  ----------------------------<  81  3.9   |  25  |  2.37<H>    Ca    8.1<L>      06 Nov 2020 06:20  Phos  3.4     11-06  Mg     2.0     11-06    TPro  5.1<L>  /  Alb  2.8<L>  /  TBili  9.2<H>  /  DBili  x   /  AST  233<H>  /  ALT  99<H>  /  AlkPhos  96  11-06      Vancomycin Level, Trough (11.07.20 @ 21:49)   Vancomycin Level, Trough: 16.1:     Vancomycin Level, Trough (11.07.20 @ 07:08)   Vancomycin Level, Trough: 19.5    vanVancomycin Level, Trough (11.06.20 @ 06:20)   Vancomycin Level, Trough: 19.9:     Vancomycin Level, Trough (11.04.20 @ 06:12)   Vancomycin Level, Trough: 31.0:     Vancomycin Level, Trough (11.03.20 @ 04:11)   Vancomycin Level, Trough: 16.1:        MEDICATIONS  (STANDING):    dextrose 5% 1000 milliLiter(s) (50 mL/Hr) IV Continuous <Continuous>  dextrose 5% + sodium chloride 0.45% with potassium chloride 10 mEq/L 100 milliLiter(s) (50 mL/Hr) IV Continuous <Continuous>  enoxaparin Injectable 50 milliGRAM(s) SubCutaneous daily  fluconAZOLE IVPB 200 milliGRAM(s) IV Intermittent every 24 hours  fluconAZOLE IVPB      furosemide   Injectable 40 milliGRAM(s) IV Push two times a day  lactulose Syrup 10 Gram(s) Oral daily  midodrine. 10 milliGRAM(s) Oral three times a day  pantoprazole   Suspension 40 milliGRAM(s) Enteral Tube daily  rifAXIMin 550 milliGRAM(s) Oral two times a day        RADIOLOGY & ADDITIONAL TESTS:    11/4/20 : Xray Chest 1 View- PORTABLE-Routine (Xray Chest 1 View- PORTABLE-Routine in AM.) (11.04.20 @ 10:59) Reexpansion of the left lung with residual left pleural effusion and/or infiltrate.  Right pulmonary edema unchanged.  Tubes and catheters in satisfactory position.      10/25/20: Xray Chest 1 View- PORTABLE-Routine (Xray Chest 1 View- PORTABLE-Routine in AM.) (10.25.20 @ 09:21) Frontal expiratory view of the chest shows the heart to be similar in size. Endotracheal tube, right jugular line and feeding tube remain present.    The lungs show similar left upper lobe infiltrate with progression of right perihilar infiltrate. Pleural effusions are similar. There is no evidence of pneumothorax.      10/23/20 : Xray Chest 1 View-PORTABLE IMMEDIATE (Xray Chest 1 View-PORTABLE IMMEDIATE .) (10.23.20 @ 19:09) Feeding tube tip near GE junction as noted. Questionable right upper lobe infiltrate. Follow-up study is recommended as clinically warranted.      10/21/20 : CT Abdomen and Pelvis w/ Oral Cont and w/ IV Cont (10.21.20 @ 15:59) Mural thickening of the left colon and rectum. Liquid stool in the colon. Apparent segmental mural thickening of the mid to distal small bowel and the proximal duodenum. Findings may represent nonspecific enterocolitis, noninfectious inflammatory bowel disease, or ischemic bowel. Clinical correlation is recommended. The celiac axis artery, SMA, and KINA are patent without stenosis.    Dilatation of the mid small bowel is again noted. Oral contrast has reached the terminal ileum. Findings may represent small bowel obstruction, or ileus related to nonspecific enterocolitis.   Cholelithiasis.    Moderate to large ascites in the abdomen, increased since the previous examination. 5 mm nonspecific noncalcified left upper lobe lung nodule; if the patient's is in the high risk category (i.e. smoker), follow-up chest CT may be pursued in 12 months to ensure stability.    Combination of atelectasis and consolidation in the left lower lobe.    Possible 1.0 cm hypodense lesion in the left lobe of the thyroid. Thyroid ultrasound may be pursued for further evaluation.   Mild bilateral pleural effusions.    Aging determinate compression fracture at T5 vertebra.        10/13/20 : CT Abdomen and Pelvis w/ IV Cont (10.13.20 @ 18:50) Diffuse dilatation of small bowel loops without a clear transition is slightly increased, likely an ileus.  Decreased moderate ascites.    1.5 cm pancreatic versus peripancreatic soft tissue nodule    10/13/20 : Xray Chest 1 View- PORTABLE-Urgent (Xray Chest 1 View- PORTABLE-Urgent .) (10.13.20 @ 16:34) Clear lungs.          MICROBIOLOGY DATA:    Culture - Surgical Swab (11.12.20 @ 05:01)   Specimen Source: .Surgical Swab Sacral decub   Culture Results:   Few Enterococcus faecium Susceptibility to follow.   Rare Candida albicans "Susceptibilities not performed"     Culture - Sputum . (10.26.20 @ 00:26)   - Ampicillin/Sulbactam: R <=8/4   - Cefazolin: R >16   - Ceftazidime: I 16   - Clindamycin: R >4   - Erythromycin: R >4   - Gentamicin: S <=1 Should not be used as monotherapy   - Levofloxacin: S <=0.5   - Linezolid: S 2   - Oxacillin: R >2   - Penicillin: R >8   - RIF- Rifampin: S <=1 Should not be used as monotherapy   - Tetra/Doxy: S <=1   - Trimethoprim/Sulfamethoxazole: S <=0.5/9.5   - Trimethoprim/Sulfamethoxazole: S <=0.5/9.5   - Vancomycin: S 1       MRSA/MSSA PCR (10.21.20 @ 09:41)   MRSA PCR Result.: Detected:       Culture - Blood (10.20.20 @ 22:09)   Specimen Source: .Blood Blood   Culture Results:  No growth to date.     Culture - Blood (10.20.20 @ 22:09)   Specimen Source: .Blood Blood   Culture Results:   No growth to date.     Culture - Blood in AM (10.16.20 @ 10:13)   Specimen Source: .Blood Blood-Peripheral   Culture Results: No growth to date.     Culture - Blood in AM (10.16.20 @ 10:13)   Specimen Source: .Blood Blood-Peripheral   Culture Results: No growth to date.     Culture - Blood (10.13.20 @ 22:23)   Growth in anaerobic bottle: Gram Negative Rods   Specimen Source: .Blood Blood-Peripheral   Organism: Blood Culture PCR   Culture Results: Growth in anaerobic bottle: Bacteroides fragilis   "Susceptibilities not performed"     Culture - Blood (10.13.20 @ 22:23)   Specimen Source: .Blood Blood-Peripheral   Culture Results:   No growth to date.                  Patient is seen and examined at the bed side, is afebrile.  No new events.  The WBC count is trending back down. The sacral wound culture grew Enterococcus faecium, VRE and only sensitive to Linezolid but patient has major D-D interaction with Midodrine.       REVIEW OF SYSTEMS: All other review systems are negative        ALLERGIES: No Known Allergies        Vital Signs Last 24 Hrs  T(C): 37.1 (17 Nov 2020 14:24), Max: 37.7 (16 Nov 2020 20:13)  T(F): 98.8 (17 Nov 2020 14:24), Max: 99.9 (16 Nov 2020 20:13)  HR: 77 (17 Nov 2020 14:24) (77 - 86)  BP: 124/70 (17 Nov 2020 14:24) (111/53 - 124/70)  BP(mean): --  RR: 16 (17 Nov 2020 14:24) (16 - 16)  SpO2: 96% (17 Nov 2020 14:24) (96% - 100%)        PHYSICAL EXAM:  GENERAL: Not in distress, on oxygen via NC  CHEST/LUNG: Not using accessory muscles   HEART: s1 and s2 present  ABDOMEN:  Nontender and  Nondistended  EXTREMITIES: B/L UE edematous  CNS: Awake and Alert         LABS:                        8.3    10.77 )-----------( 208      ( 17 Nov 2020 10:30 )             26.4                           8.2    12.15 )-----------( 192      ( 16 Nov 2020 05:54 )             25.9                         9.4    13.14 )-----------( 149      ( 14 Nov 2020 07:58 )             28.7         11-17    147<H>  |  108  |  35<H>  ----------------------------<  74  3.1<L>   |  32<H>  |  1.58<H>    Ca    7.6<L>      17 Nov 2020 10:30  Phos  3.3     11-17  Mg     1.7     11-17 11-16    145  |  106  |  31<H>  ----------------------------<  82  3.1<L>   |  30  |  1.69<H>    Ca    8.1<L>      16 Nov 2020 05:54  Phos  3.4     11-16  Mg     1.7     11-16 11-06    138  |  104  |  37<H>  ----------------------------<  81  3.9   |  25  |  2.37<H>    Ca    8.1<L>      06 Nov 2020 06:20  Phos  3.4     11-06  Mg     2.0     11-06    TPro  5.1<L>  /  Alb  2.8<L>  /  TBili  9.2<H>  /  DBili  x   /  AST  233<H>  /  ALT  99<H>  /  AlkPhos  96  11-06      Vancomycin Level, Trough (11.07.20 @ 21:49)   Vancomycin Level, Trough: 16.1:     Vancomycin Level, Trough (11.07.20 @ 07:08)   Vancomycin Level, Trough: 19.5    vanVancomycin Level, Trough (11.06.20 @ 06:20)   Vancomycin Level, Trough: 19.9:     Vancomycin Level, Trough (11.04.20 @ 06:12)   Vancomycin Level, Trough: 31.0:     Vancomycin Level, Trough (11.03.20 @ 04:11)   Vancomycin Level, Trough: 16.1:        MEDICATIONS  (STANDING):    dextrose 5% 1000 milliLiter(s) (50 mL/Hr) IV Continuous <Continuous>  dextrose 5% + sodium chloride 0.45% with potassium chloride 10 mEq/L 100 milliLiter(s) (50 mL/Hr) IV Continuous <Continuous>  enoxaparin Injectable 50 milliGRAM(s) SubCutaneous daily  fluconAZOLE IVPB 200 milliGRAM(s) IV Intermittent every 24 hours  fluconAZOLE IVPB      furosemide   Injectable 40 milliGRAM(s) IV Push two times a day  lactulose Syrup 10 Gram(s) Oral daily  midodrine. 10 milliGRAM(s) Oral three times a day  pantoprazole   Suspension 40 milliGRAM(s) Enteral Tube daily  rifAXIMin 550 milliGRAM(s) Oral two times a day        RADIOLOGY & ADDITIONAL TESTS:    11/4/20 : Xray Chest 1 View- PORTABLE-Routine (Xray Chest 1 View- PORTABLE-Routine in AM.) (11.04.20 @ 10:59) Reexpansion of the left lung with residual left pleural effusion and/or infiltrate.  Right pulmonary edema unchanged.  Tubes and catheters in satisfactory position.      10/25/20: Xray Chest 1 View- PORTABLE-Routine (Xray Chest 1 View- PORTABLE-Routine in AM.) (10.25.20 @ 09:21) Frontal expiratory view of the chest shows the heart to be similar in size. Endotracheal tube, right jugular line and feeding tube remain present.    The lungs show similar left upper lobe infiltrate with progression of right perihilar infiltrate. Pleural effusions are similar. There is no evidence of pneumothorax.      10/23/20 : Xray Chest 1 View-PORTABLE IMMEDIATE (Xray Chest 1 View-PORTABLE IMMEDIATE .) (10.23.20 @ 19:09) Feeding tube tip near GE junction as noted. Questionable right upper lobe infiltrate. Follow-up study is recommended as clinically warranted.      10/21/20 : CT Abdomen and Pelvis w/ Oral Cont and w/ IV Cont (10.21.20 @ 15:59) Mural thickening of the left colon and rectum. Liquid stool in the colon. Apparent segmental mural thickening of the mid to distal small bowel and the proximal duodenum. Findings may represent nonspecific enterocolitis, noninfectious inflammatory bowel disease, or ischemic bowel. Clinical correlation is recommended. The celiac axis artery, SMA, and KINA are patent without stenosis.    Dilatation of the mid small bowel is again noted. Oral contrast has reached the terminal ileum. Findings may represent small bowel obstruction, or ileus related to nonspecific enterocolitis.   Cholelithiasis.    Moderate to large ascites in the abdomen, increased since the previous examination. 5 mm nonspecific noncalcified left upper lobe lung nodule; if the patient's is in the high risk category (i.e. smoker), follow-up chest CT may be pursued in 12 months to ensure stability.    Combination of atelectasis and consolidation in the left lower lobe.    Possible 1.0 cm hypodense lesion in the left lobe of the thyroid. Thyroid ultrasound may be pursued for further evaluation.   Mild bilateral pleural effusions.    Aging determinate compression fracture at T5 vertebra.        10/13/20 : CT Abdomen and Pelvis w/ IV Cont (10.13.20 @ 18:50) Diffuse dilatation of small bowel loops without a clear transition is slightly increased, likely an ileus.  Decreased moderate ascites.    1.5 cm pancreatic versus peripancreatic soft tissue nodule    10/13/20 : Xray Chest 1 View- PORTABLE-Urgent (Xray Chest 1 View- PORTABLE-Urgent .) (10.13.20 @ 16:34) Clear lungs.          MICROBIOLOGY DATA:    Culture - Surgical Swab (11.12.20 @ 05:01)   Specimen Source: .Surgical Swab Sacral decub   Culture Results:   Few Enterococcus faecium Susceptibility to follow.   Rare Candida albicans "Susceptibilities not performed"     Culture - Sputum . (10.26.20 @ 00:26)   - Ampicillin/Sulbactam: R <=8/4   - Cefazolin: R >16   - Ceftazidime: I 16   - Clindamycin: R >4   - Erythromycin: R >4   - Gentamicin: S <=1 Should not be used as monotherapy   - Levofloxacin: S <=0.5   - Linezolid: S 2   - Oxacillin: R >2   - Penicillin: R >8   - RIF- Rifampin: S <=1 Should not be used as monotherapy   - Tetra/Doxy: S <=1   - Trimethoprim/Sulfamethoxazole: S <=0.5/9.5   - Trimethoprim/Sulfamethoxazole: S <=0.5/9.5   - Vancomycin: S 1       MRSA/MSSA PCR (10.21.20 @ 09:41)   MRSA PCR Result.: Detected:       Culture - Blood (10.20.20 @ 22:09)   Specimen Source: .Blood Blood   Culture Results:  No growth to date.     Culture - Blood (10.20.20 @ 22:09)   Specimen Source: .Blood Blood   Culture Results:   No growth to date.     Culture - Blood in AM (10.16.20 @ 10:13)   Specimen Source: .Blood Blood-Peripheral   Culture Results: No growth to date.     Culture - Blood in AM (10.16.20 @ 10:13)   Specimen Source: .Blood Blood-Peripheral   Culture Results: No growth to date.     Culture - Blood (10.13.20 @ 22:23)   Growth in anaerobic bottle: Gram Negative Rods   Specimen Source: .Blood Blood-Peripheral   Organism: Blood Culture PCR   Culture Results: Growth in anaerobic bottle: Bacteroides fragilis   "Susceptibilities not performed"     Culture - Blood (10.13.20 @ 22:23)   Specimen Source: .Blood Blood-Peripheral   Culture Results:   No growth to date.

## 2020-11-17 NOTE — PHARMACOTHERAPY INTERVENTION NOTE - COMMENTS
50 mg subcutaneously every 12 hours changed to once a day-crcl=29.7
Dr Rivera,[facility] ordered thiamine 200mg  IVPush  3 times a day -recommended to change to ivpb
Pantoprazole changed from IV to PO due to advancement of patient's diet and tolerance of other PO meds during the Critical Care rounds.
fentanyl drip ordered at 0.05mcg /kg/hr ordered /recommended to MD to check dose
ABx were missing end dates, so asked for clarification on the duration of therapy. ABx will be discontinued later today.
Improving renal function, optimize pharmacokinetics of levofloxacin, change dosing from 500 mg Q24H to 750 mg Q48H.
Patient has vancomycin troughs slightly above the goal of 15-20 and serum creatinine increasing to 2.0. Recommend drawing vancomycin trough level prior to vancomycin administration based on patient's CrCl of less than 30.
Patient was on Vancomycin 1gm IV Q12hrs and the trough result came back at a level of 36.3, so Vancomycin was D/Malachi. The trough will be checked again tomorrow morning and the patient may be restarted on Vancomycin for the treatment of MRSA, if the level is within normal level. Recommended changing frequency from Q12hrs to Q24hrs when Vancomycin 1gm is restarted based on the trough being within the normal level and due to patient's current CrCl of 39mL/min.
Patient's CrCl is currently 24.48 mL/min and is on vancomycin 500mg IV q12hrs. Recommended ordering a vancomycin trough 30 minutes prior to the administration of the next dose to determine whether to administered the dose or not. If the trough is elevated, the frequency can be changed from q12hrs to q24hrs.

## 2020-11-17 NOTE — PROGRESS NOTE ADULT - PROBLEM SELECTOR PROBLEM 9
Infant remains stable in room air. Occasional SR desats. BFX1. Tolerating gavage feedings. Voiding, stooling. Continue to monitor and notify provider with any concerns.    Need for prophylactic measure

## 2020-11-17 NOTE — PROGRESS NOTE ADULT - PROBLEM SELECTOR PLAN 5
S/P sacral wound debridement  Continue fluconazole as per ID  ID f/u   Continue wound care as ordered.  Turn every 2 hours.  OOB daily.

## 2020-11-17 NOTE — PROGRESS NOTE ADULT - SUBJECTIVE AND OBJECTIVE BOX
feel better today  no fever or pain  bp stable    MEDICATIONS  (STANDING):  dextrose 5% 1000 milliLiter(s) (50 mL/Hr) IV Continuous <Continuous>  dextrose 5% + sodium chloride 0.45% with potassium chloride 10 mEq/L 100 milliLiter(s) (50 mL/Hr) IV Continuous <Continuous>  fluconAZOLE IVPB 200 milliGRAM(s) IV Intermittent every 24 hours  fluconAZOLE IVPB      furosemide   Injectable 40 milliGRAM(s) IV Push two times a day  heparin   Injectable 5000 Unit(s) SubCutaneous every 12 hours  lactulose Syrup 10 Gram(s) Oral daily  midodrine. 10 milliGRAM(s) Oral three times a day  pantoprazole   Suspension 40 milliGRAM(s) Enteral Tube daily  potassium chloride   Powder 40 milliEquivalent(s) Oral once  rifAXIMin 550 milliGRAM(s) Oral two times a day    MEDICATIONS  (PRN):      Allergies    No Known Allergies    Intolerances        Vital Signs Last 24 Hrs  T(C): 37 (17 Nov 2020 05:15), Max: 37.7 (16 Nov 2020 20:13)  T(F): 98.6 (17 Nov 2020 05:15), Max: 99.9 (16 Nov 2020 20:13)  HR: 86 (17 Nov 2020 05:15) (73 - 86)  BP: 124/67 (17 Nov 2020 05:15) (102/63 - 124/67)  BP(mean): --  RR: 16 (17 Nov 2020 05:15) (16 - 18)  SpO2: 100% (17 Nov 2020 05:15) (98% - 100%)    PHYSICAL EXAM  General: adult in NAD  HEENT: clear oropharynx, anicteric sclera, pink conjunctiva  Neck: supple  CV: normal S1/S2 with no murmur rubs or gallops  Lungs: positive air movement b/l ant lungs,clear to auscultation, no wheezes, no rales  Abdomen: soft non-tender non-distended, no hepatosplenomegaly  Ext: no clubbing cyanosis or edema  Skin: no rashes and no petechiae  Neuro: alert and oriented X 4, no focal deficits  LABS:                          8.2    12.15 )-----------( 192      ( 16 Nov 2020 05:54 )             25.9         Mean Cell Volume : 91.8 fl  Mean Cell Hemoglobin : 29.1 pg  Mean Cell Hemoglobin Concentration : 31.7 gm/dL  Auto Neutrophil # : x  Auto Lymphocyte # : x  Auto Monocyte # : x  Auto Eosinophil # : x  Auto Basophil # : x  Auto Neutrophil % : x  Auto Lymphocyte % : x  Auto Monocyte % : x  Auto Eosinophil % : x  Auto Basophil % : x    Serial CBC  Hematocrit 25.9  Hemoglobin 8.2  Plat 192  RBC 2.82  WBC 12.15  Serial CBC  Hematocrit 27.0  Hemoglobin 8.7  Plat 164  RBC 2.96  WBC 10.92  Serial CBC  Hematocrit 28.7  Hemoglobin 9.4  Plat 149  RBC 3.19  WBC 13.14    11-16    145  |  106  |  31<H>  ----------------------------<  82  3.1<L>   |  30  |  1.69<H>    Ca    8.1<L>      16 Nov 2020 05:54  Phos  3.4     11-16  Mg     1.7     11-16                      BLOOD SMEAR INTERPRETATION:       RADIOLOGY & ADDITIONAL STUDIES:

## 2020-11-17 NOTE — PROGRESS NOTE ADULT - SUBJECTIVE AND OBJECTIVE BOX
MARIBETH PADILLA    SCU progress note    INTERVAL HPI/OVERNIGHT EVENTS: ***No overnight events.    DNR [x ]   DNI  [  ]   TRIAL OF INTUBATION    Covid - 19 PCR: Negative 11/9    The 4Ms    What Matters Most: see [facility]  Age appropriate Medications/Screen for High Risk Medication: Yes  Mentation: see CAM below  Mobility: defer to physical exam    The Confusion Assessment Method (CAM) Diagnostic Algorithm     1: Acute Onset or Fluctuating Course  - Is there evidence of an acute change in mental status from the patient’s baseline? Did the (abnormal) behavior  fluctuate during the day, that is, tend to come and go, or increase and decrease in severity?  [ ] YES [x ] NO     2: Inattention  - Did the patient have difficulty focusing attention, being easily distractible, or having difficulty keeping track of what was being said?  [ ] YES [x ] NO     3: Disorganized thinking  -Was the patient’s thinking disorganized or incoherent, such as rambling or irrelevant conversation, unclear or illogical flow of ideas, or unpredictable switching from subject to subject?  [ ] YES [x ] NO    4: Altered Level of consciousness?  [ ] YES [x ] NO    The diagnosis of delirium by CAM requires the presence of features 1 and 2 and either 3 or 4.    PRESSORS: [ ] YES [x ] NO  fluconAZOLE IVPB 200 milliGRAM(s) IV Intermittent every 24 hours  fluconAZOLE IVPB      rifAXIMin 550 milliGRAM(s) Oral two times a day    Cardiovascular:  Heart Failure  Acute   Acute on Chronic  Chronic       furosemide   Injectable 40 milliGRAM(s) IV Push two times a day  midodrine. 10 milliGRAM(s) Oral three times a day    Pulmonary:    Hematalogic:  enoxaparin Injectable 50 milliGRAM(s) SubCutaneous daily    Other:  dextrose 5% 1000 milliLiter(s) IV Continuous <Continuous>  dextrose 5% + sodium chloride 0.45% with potassium chloride 10 mEq/L 100 milliLiter(s) IV Continuous <Continuous>  lactulose Syrup 10 Gram(s) Oral daily  pantoprazole   Suspension 40 milliGRAM(s) Enteral Tube daily    dextrose 5% 1000 milliLiter(s) IV Continuous <Continuous>  dextrose 5% + sodium chloride 0.45% with potassium chloride 10 mEq/L 100 milliLiter(s) IV Continuous <Continuous>  enoxaparin Injectable 50 milliGRAM(s) SubCutaneous daily  fluconAZOLE IVPB 200 milliGRAM(s) IV Intermittent every 24 hours  fluconAZOLE IVPB      furosemide   Injectable 40 milliGRAM(s) IV Push two times a day  lactulose Syrup 10 Gram(s) Oral daily  midodrine. 10 milliGRAM(s) Oral three times a day  pantoprazole   Suspension 40 milliGRAM(s) Enteral Tube daily  rifAXIMin 550 milliGRAM(s) Oral two times a day    Drug Dosing Weight  Height (cm): 167.6 (21 Oct 2020 02:26)  Weight (kg): 56.2 (21 Oct 2020 02:26)  BMI (kg/m2): 20 (21 Oct 2020 02:26)  BSA (m2): 1.63 (21 Oct 2020 02:26)    CENTRAL LINE: [ ] YES [x ] NO  LOCATION:   DATE INSERTED:  REMOVE: [ ] YES [ ] NO  EXPLAIN:    TREJO: [x ] YES [ ] NO    DATE INSERTED:  REMOVE:  [ ] YES [ ] NO  EXPLAIN:  wound    PAST MEDICAL & SURGICAL HISTORY:  Anasarca    Pulmonary embolism    DVT (deep venous thrombosis)    SBO (small bowel obstruction)    H/O exploratory laparotomy                11-16 @ 07:01  -  11-17 @ 07:00  --------------------------------------------------------  IN: 0 mL / OUT: 2400 mL / NET: -2400 mL            PHYSICAL EXAM:    GENERAL: NAD, cachectic  HEAD:  Atraumatic, Normocephalic  EYES: EOMI, PERRLA, conjunctiva and sclera clear  ENMT: No tonsillar erythema, exudates  NECK: Supple, No JVD  NERVOUS SYSTEM:  Awake and alert. Can follow simple commands  CHEST/LUNG: Clear to percussion bilaterally; No rales, rhonchi, wheezing, or rubs  HEART: Regular rate and rhythm; No murmurs, rubs, or gallops  ABDOMEN: Soft, Nontender, Nondistended; Bowel sounds present  EXTREMITIES:  2+ Peripheral Pulses, No clubbing, cyanosis, or edema  LYMPH: No lymphadenopathy noted  SKIN: Sacral decub      LABS:  CBC Full  -  ( 17 Nov 2020 10:30 )  WBC Count : 10.77 K/uL  RBC Count : 2.82 M/uL  Hemoglobin : 8.3 g/dL  Hematocrit : 26.4 %  Platelet Count - Automated : 208 K/uL  Mean Cell Volume : 93.6 fl  Mean Cell Hemoglobin : 29.4 pg  Mean Cell Hemoglobin Concentration : 31.4 gm/dL  Auto Neutrophil # : x  Auto Lymphocyte # : x  Auto Monocyte # : x  Auto Eosinophil # : x  Auto Basophil # : x  Auto Neutrophil % : x  Auto Lymphocyte % : x  Auto Monocyte % : x  Auto Eosinophil % : x  Auto Basophil % : x    11-17    147<H>  |  108  |  35<H>  ----------------------------<  74  3.1<L>   |  32<H>  |  1.58<H>    Ca    7.6<L>      17 Nov 2020 10:30  Phos  3.3     11-17  Mg     1.7     11-17                [  ]  DVT Prophylaxis  [  ]  Nutrition, Brand, Rate         Goal Rate        Abnormal Nutritional Status -  Malnutrition   Cachexia         RADIOLOGY & ADDITIONAL STUDIES:  ***  < from: Xray Chest 1 View- PORTABLE-Routine (Xray Chest 1 View- PORTABLE-Routine in AM.) (11.11.20 @ 08:16) >  The lung apices are partially obscured by the patient's head and chin.    Right central venous catheter is unchanged in position.  Nasogastric tube is been removed.    The evaluation of the cardiomediastinal silhouette is limited on portable technique.    There are persistent bilateral pleural effusions with underlying airspace consolidation.    < end of copied text >  < from: CT Chest w/ IV Cont (10.21.20 @ 16:03) >  Upon further review, there is apparent mild luminal narrowing at the distal small bowel, probably due to muralthickening (image 83 and 100 series 2). This may be represent the transition point for the differential consideration of small bowel obstruction. Ileus is still in the differential consideration because oral contrast has reached the terminal ileum. Dilatation of the proximal ascending colon measuring 7.9 cm in diameter.    Upon further review, there is a mildly enlarged 1.2 cm peripancreatic lymph node.    Case discussed with Dr. Dean.    *** END OF ADDENDUM 10/21/2020  ***      PROCEDURE DATE:  10/21/2020          INTERPRETATION:  CT of the chest, abdomen, and pelvis with IV contrast    Indication: Shortness of breath. Clinical concern for bowel obstruction. Sepsis.    Technique: Axial multidetector CT images of the chest, abdomen, and pelvisare acquired following the administration of oral contrast and IV contrast (90 cc Omnipaque-350 administered, 10 cc discarded).    Comparison: No prior chest CT is available for comparison. Abdominal/pelvic CT dated 10/13/2020.    Findings:    Chest:Mild bilateral pleural effusions. No pericardial effusion. The heart is not enlarged. Aortic calcifications are present. No evidence for aortic dissection. Ectasia of the ascending aorta at 3.8 cm in diameter. Retroesophageal right aberrant subclavian artery. Possible 1.0 cm hypodense lesion in the left lobe of the thyroid. Thyroid ultrasound may be pursued for further evaluation. No enlarged mediastinal, hilar, or axillary lymph node.    The trachea and central bronchi are patent.    No evidenceof pneumothorax. Combination of atelectasis and consolidation in the left lower lobe. Mild atelectasis in other lung fields bilaterally. Small calcified granuloma in the lingula. 5 mm nonspecific noncalcified left upper lobe lung nodule (image 59 series 3); if the patient's is in the high risk category (i.e. smoker), follow-up chest CT may be pursued in 12 months to ensure stability.    Abdomen/pelvis: Respiratory motion artifact limits evaluation. Gallstones are again seen in the gallbladder. Evaluation of the gallbladder wall is limited by respiratory motion artifact. The liver, common bile duct, pancreas, spleen, adrenals and left kidney appear unremarkable. Small hypodense lesion in the right kidney, too small to characterize.    The appendix appears unremarkable. Mural thickening of the left colon and rectum. Liquid stool in the colon. Oral contrast has reached the terminal ileum. Apparent segmental mural thickening of the mid to distal small bowel and the proximal duodenum. Dilatation of the mid small bowel is again noted.    No evidence for free air or enlarged lymph node. Moderate to large ascites in the abdomen, increased since the previous examination.    The celiac axis artery, SMA, and KINA are patent without stenosis. IVC filter is again noted.    Trejo catheter in the urinary bladder. The uterus appears unremarkable.    Skeleton: Aging determinate compression fracture at T5 vertebra. Degenerative spondylosis.    Impression:    Mural thickening of the left colon and rectum. Liquid stool in the colon. Apparent segmental mural thickening of the mid to distal small bowel and the proximal duodenum. Findings may represent nonspecific enterocolitis, noninfectious inflammatory bowel disease, or ischemic bowel. Clinical correlation is recommended. The celiac axis artery, SMA, and KINA are patent without stenosis.    Dilatation of the mid small bowel is again noted. Oral contrast has reached the terminal ileum. Findings may represent small bowel obstruction, or ileus related to nonspecific enterocolitis.    Cholelithiasis.    Moderate to large ascites in the abdomen, increased since the previous examination.    5 mm nonspecific noncalcified left upper lobe lung nodule; if the patient's is in the high risk category (i.e. smoker), follow-up chest CT may be pursued in 12 months to ensure stability.    Combination of atelectasis and consolidation in the left lower lobe.    Possible 1.0 cm hypodense lesion in the left lobe of the thyroid. Thyroid ultrasound may be pursued for further evaluation.    Mild bilateral pleural effusions.    Aging determinate compression fracture at T5 vertebra.    < end of copied text >    Goals of Care Discussion with Family/Proxy/Other   - see note from 10/23

## 2020-11-18 NOTE — GOALS OF CARE CONVERSATION - ADVANCED CARE PLANNING - WHAT MATTERS MOST
Patient's daughter stated she wants her mother to transition to JOHNATHAN with hope for potential improvement of functional status.

## 2020-11-18 NOTE — PROGRESS NOTE ADULT - SUBJECTIVE AND OBJECTIVE BOX
feel better  eat better  no fever or diarrhea    MEDICATIONS  (STANDING):  ascorbic acid 500 milliGRAM(s) Oral daily  DAPTOmycin IVPB      DAPTOmycin IVPB 350 milliGRAM(s) IV Intermittent every 24 hours  dextrose 5% 1000 milliLiter(s) (50 mL/Hr) IV Continuous <Continuous>  dextrose 5% 1000 milliLiter(s) (50 mL/Hr) IV Continuous <Continuous>  enoxaparin Injectable 50 milliGRAM(s) SubCutaneous daily  fluconAZOLE IVPB 200 milliGRAM(s) IV Intermittent every 24 hours  fluconAZOLE IVPB      furosemide    Tablet 40 milliGRAM(s) Oral every 12 hours  lactulose Syrup 10 Gram(s) Oral daily  multivitamin 1 Tablet(s) Oral daily  pantoprazole   Suspension 40 milliGRAM(s) Enteral Tube daily  rifAXIMin 550 milliGRAM(s) Oral two times a day  zinc sulfate 220 milliGRAM(s) Oral daily    MEDICATIONS  (PRN):      Allergies    No Known Allergies    Intolerances        Vital Signs Last 24 Hrs  T(C): 36.8 (18 Nov 2020 05:00), Max: 37.7 (17 Nov 2020 20:17)  T(F): 98.3 (18 Nov 2020 05:00), Max: 99.8 (17 Nov 2020 20:17)  HR: 94 (18 Nov 2020 09:55) (77 - 95)  BP: 116/71 (18 Nov 2020 09:55) (112/65 - 124/70)  BP(mean): --  RR: 16 (18 Nov 2020 05:00) (16 - 16)  SpO2: 100% (18 Nov 2020 09:55) (96% - 100%)    PHYSICAL EXAM  General: adult in NAD  HEENT: clear oropharynx, anicteric sclera, pink conjunctiva  Neck: supple  CV: normal S1/S2 with no murmur rubs or gallops  Lungs: positive air movement b/l ant lungs,clear to auscultation, no wheezes, no rales  Abdomen: soft non-tender non-distended, no hepatosplenomegaly  Ext: no clubbing cyanosis or edema  Skin: no rashes and no petechiae  Neuro: alert and oriented X 4, no focal deficits  LABS:                          7.8    11.15 )-----------( 221      ( 18 Nov 2020 06:22 )             25.2         Mean Cell Volume : 94.0 fl  Mean Cell Hemoglobin : 29.1 pg  Mean Cell Hemoglobin Concentration : 31.0 gm/dL  Auto Neutrophil # : 8.47 K/uL  Auto Lymphocyte # : 1.13 K/uL  Auto Monocyte # : 1.27 K/uL  Auto Eosinophil # : 0.17 K/uL  Auto Basophil # : 0.08 K/uL  Auto Neutrophil % : 76.0 %  Auto Lymphocyte % : 10.1 %  Auto Monocyte % : 11.4 %  Auto Eosinophil % : 1.5 %  Auto Basophil % : 0.7 %    Serial CBC  Hematocrit 25.2  Hemoglobin 7.8  Plat 221  RBC 2.68  WBC 11.15  Serial CBC  Hematocrit 26.4  Hemoglobin 8.3  Plat 208  RBC 2.82  WBC 10.77  Serial CBC  Hematocrit 25.9  Hemoglobin 8.2  Plat 192  RBC 2.82  WBC 12.15  Serial CBC  Hematocrit 27.0  Hemoglobin 8.7  Plat 164  RBC 2.96  WBC 10.92    11-18    148<H>  |  110<H>  |  33<H>  ----------------------------<  84  3.1<L>   |  33<H>  |  1.55<H>    Ca    8.0<L>      18 Nov 2020 06:22  Phos  3.0     11-18  Mg     1.8     11-18    TPro  6.0  /  Alb  2.3<L>  /  TBili  2.7<H>  /  DBili  x   /  AST  89<H>  /  ALT  49  /  AlkPhos  130<H>  11-18                    BLOOD SMEAR INTERPRETATION:       RADIOLOGY & ADDITIONAL STUDIES:

## 2020-11-18 NOTE — PROGRESS NOTE ADULT - SUBJECTIVE AND OBJECTIVE BOX
OVERNIGHT EVENTS: No overnight events. Midodrine d/c yesterday secondary to daptomycin being started.    Present Symptoms:   Dyspnea:   Nausea/Vomiting:   Anxiety:    Depressed Mood:   Fatigue:   Loss of appetite:   Pain:                   location:   Review of Systems: [All others negative or Unable to obtain due to poor mentation]    MEDICATIONS  (STANDING):  ascorbic acid 500 milliGRAM(s) Oral daily  DAPTOmycin IVPB      DAPTOmycin IVPB 350 milliGRAM(s) IV Intermittent every 24 hours  dextrose 5% 1000 milliLiter(s) (50 mL/Hr) IV Continuous <Continuous>  dextrose 5% 1000 milliLiter(s) (50 mL/Hr) IV Continuous <Continuous>  enoxaparin Injectable 50 milliGRAM(s) SubCutaneous daily  fluconAZOLE IVPB 200 milliGRAM(s) IV Intermittent every 24 hours  fluconAZOLE IVPB      furosemide    Tablet 40 milliGRAM(s) Oral every 12 hours  lactulose Syrup 10 Gram(s) Oral daily  multivitamin 1 Tablet(s) Oral daily  pantoprazole   Suspension 40 milliGRAM(s) Enteral Tube daily  rifAXIMin 550 milliGRAM(s) Oral two times a day  zinc sulfate 220 milliGRAM(s) Oral daily    MEDICATIONS  (PRN):      PHYSICAL EXAM:  Vital Signs Last 24 Hrs  T(C): 36.8 (18 Nov 2020 05:00), Max: 37.7 (17 Nov 2020 20:17)  T(F): 98.3 (18 Nov 2020 05:00), Max: 99.8 (17 Nov 2020 20:17)  HR: 93 (18 Nov 2020 05:00) (77 - 93)  BP: 112/65 (18 Nov 2020 05:00) (112/65 - 124/70)  BP(mean): --  RR: 16 (18 Nov 2020 05:00) (16 - 16)  SpO2: 100% (18 Nov 2020 05:00) (96% - 100%)  eneral: Patient awake, responsive, minimally verbal, cachectic, follows very simple commands, ill appearing, +weak, NAD  Karnofsky Performance Score/Palliative Performance Status Version2: 20%    HEENT: +temporal wasting  Lungs:  On nasal canula. No signs of respiratory distress.    CV: S1S2  GI: incontinent  :   velazquez  Musculoskeletal: +generalized weakness, dependent on ADLs  Skin: +anasarca improving; coccyx DTI  Neuro: Patient awake, responsive, minimally verbal, cachectic, follows very simple command  Oral intake ability: minimal  Diet: pureed/nectar    LABS:                          7.8    11.15 )-----------( 221      ( 18 Nov 2020 06:22 )             25.2     11-18    148<H>  |  110<H>  |  33<H>  ----------------------------<  84  3.1<L>   |  33<H>  |  1.55<H>    Ca    8.0<L>      18 Nov 2020 06:22  Phos  3.0     11-18  Mg     1.8     11-18    TPro  6.0  /  Alb  2.3<L>  /  TBili  2.7<H>  /  DBili  x   /  AST  89<H>  /  ALT  49  /  AlkPhos  130<H>  11-18        RADIOLOGY & ADDITIONAL STUDIES:  < from: Xray Chest 1 View- PORTABLE-Routine (Xray Chest 1 View- PORTABLE-Routine in AM.) (11.11.20 @ 08:16) >  EXAM:  XR CHEST PORTABLE ROUTINE 1V                        PROCEDURE DATE:  11/11/2020    INTERPRETATION:  Chest portable.  Clinical History: Aspiration pneumonia. I see follow-up.  Comparison: 11/10/2020.  Single AP view submitted.  The lung apices are partially obscured by the patient's head and chin.  Right central venous catheter is unchanged in position.  nasogastric tube is been removed.  The evaluation of the cardiomediastinal silhouette is limited on portable technique.  There are persistent bilateral pleural effusions with underlying airspace consolidation.    Impression:  Findings as discussed above.  < end of copied text >      ADVANCE DIRECTIVES:     OVERNIGHT EVENTS: No overnight events. Midodrine d/c yesterday secondary to daptomycin being started.    Present Symptoms:   Review of Systems: Patient with no c/o; states, "I'm fine."     MEDICATIONS  (STANDING):  ascorbic acid 500 milliGRAM(s) Oral daily  DAPTOmycin IVPB      DAPTOmycin IVPB 350 milliGRAM(s) IV Intermittent every 24 hours  dextrose 5% 1000 milliLiter(s) (50 mL/Hr) IV Continuous <Continuous>  dextrose 5% 1000 milliLiter(s) (50 mL/Hr) IV Continuous <Continuous>  enoxaparin Injectable 50 milliGRAM(s) SubCutaneous daily  fluconAZOLE IVPB 200 milliGRAM(s) IV Intermittent every 24 hours  fluconAZOLE IVPB      furosemide    Tablet 40 milliGRAM(s) Oral every 12 hours  lactulose Syrup 10 Gram(s) Oral daily  multivitamin 1 Tablet(s) Oral daily  pantoprazole   Suspension 40 milliGRAM(s) Enteral Tube daily  rifAXIMin 550 milliGRAM(s) Oral two times a day  zinc sulfate 220 milliGRAM(s) Oral daily    MEDICATIONS  (PRN):      PHYSICAL EXAM:  Vital Signs Last 24 Hrs  T(C): 36.8 (18 Nov 2020 05:00), Max: 37.7 (17 Nov 2020 20:17)  T(F): 98.3 (18 Nov 2020 05:00), Max: 99.8 (17 Nov 2020 20:17)  HR: 93 (18 Nov 2020 05:00) (77 - 93)  BP: 112/65 (18 Nov 2020 05:00) (112/65 - 124/70)  BP(mean): --  RR: 16 (18 Nov 2020 05:00) (16 - 16)  SpO2: 100% (18 Nov 2020 05:00) (96% - 100%)  General: Patient awake, responsive, minimally verbal, A&O x 2, cachectic, follows very simple commands, +weak, NAD.   Karnofsky Performance Score/Palliative Performance Status Version2: 20%    HEENT: +temporal wasting  Lungs:  On RA.  No signs of respiratory distress.    CV: S1S2, + tachycardia  GI: incontinent  :   velazquez  Musculoskeletal: +generalized weakness, dependent on ADLs, follows simple commands   Skin: +anasarca improving; coccyx DTI  Neuro: Patient awake, responsive, A&O x 2, cachectic, follows very simple command  Oral intake ability: minimal  Diet: pureed/nectar    LABS:                        7.8    11.15 )-----------( 221      ( 18 Nov 2020 06:22 )             25.2     11-18    148<H>  |  110<H>  |  33<H>  ----------------------------<  84  3.1<L>   |  33<H>  |  1.55<H>    Ca    8.0<L>      18 Nov 2020 06:22  Phos  3.0     11-18  Mg     1.8     11-18    TPro  6.0  /  Alb  2.3<L>  /  TBili  2.7<H>  /  DBili  x   /  AST  89<H>  /  ALT  49  /  AlkPhos  130<H>  11-18    RADIOLOGY & ADDITIONAL STUDIES:  < from: Xray Chest 1 View- PORTABLE-Routine (Xray Chest 1 View- PORTABLE-Routine in AM.) (11.11.20 @ 08:16) >  EXAM:  XR CHEST PORTABLE ROUTINE 1V                        PROCEDURE DATE:  11/11/2020    INTERPRETATION:  Chest portable.  Clinical History: Aspiration pneumonia. I see follow-up.  Comparison: 11/10/2020.  Single AP view submitted.  The lung apices are partially obscured by the patient's head and chin.  Right central venous catheter is unchanged in position.  nasogastric tube is been removed.  The evaluation of the cardiomediastinal silhouette is limited on portable technique.  There are persistent bilateral pleural effusions with underlying airspace consolidation.    Impression:  Findings as discussed above.  < end of copied text >      ADVANCE DIRECTIVES: MOLST: DNR / trial intubation/ trial feeding tube / trial IV fluids / use abx

## 2020-11-18 NOTE — PROGRESS NOTE ADULT - ASSESSMENT
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  BC grew bacteroides.    Problem/Recommendation - 1:  Problem: Bacteremia. Recommendation: bacteroides  most likely GI origin  on antibiotics  she had hypothermia and hypotension and elevated lactic acid  in ICU now, she is extubated today, bp is more stable  multiorgan failure but improving  christel is trending down now, cholestasis, etiology?  Cr continue to trend down slowly,  may be due to poor perfusion  Problem/Recommendation - 2:·  Problem: Anemia.  Recommendation: ferritin, b12 folate normalno hemolysis  most likley due to malnutrition from short bowel syndrome.she also had GIB multiple times before. Problem/Recommendation - 3:·  Problem: Acute deep vein thrombosis (DVT) of other vein of upper extremity.  Recommendation: she has been on xarelto for 2 years.  DVT at left arm and left leg before.  in all CT scan i did not see report of IVC or hepatic vein thrombosisoff xarelto and lovenox due to elevated PT/PTT. elevated PT/PTT due to malnutrition and antibiotics causing VITK def  now it is mostly recovered  it begins to rise again, correctable by mixing study  probably due to liver failure this time  restart lovenox at half dose due to low GFR

## 2020-11-18 NOTE — PROGRESS NOTE ADULT - PROBLEM SELECTOR PLAN 3
Dx per dietary; 2/2 comorbidities, acute illness. Albumin: 2.3, + temporal wasting, + anasarca, + skin failure. High risk for skin breakdown. On pureed/nectar thick diet - poor PO intake reported. Family reported significant weight loss prior to hospitalization. Dx per dietary; 2/2 comorbidities, acute illness. Albumin: 2.3, + temporal wasting, + anasarca, + skin failure. High risk for skin breakdown. On pureed/nectar thick diet. Family reported significant weight loss prior to hospitalization. Trial feeding tube as per MOLST.

## 2020-11-18 NOTE — PROGRESS NOTE ADULT - ASSESSMENT
65y Female from home, lives with daughter, ambulates with walker with PMH of recurrent SBO's, s/p exp lap, SB resection in 2015, ex lap, ALBINA in 2018, DVT, PE, on Xarelto, IVC filter, chronic leg swelling, and anasarca, came in to the ED due to hypotension during office visit. Patient was found to be bacteremic with bacteroids fragilis. Admitted in ICU due to hypotension and hypothermia. patient completed course of antibiotics . BCx X2 negative. Ammonia level elevated and patient refused NGT placement, mental status deteriorated and patient desaturated to high 70%, patient got intubated on 10/24 . Sputum was positive for MRSA and Stenotrophomonas. patient started on vancomycin and Levaquin. patient was given lactulose for high ammonia level. kidney function and liver function started to deteriorate and patient was leaning toward multi organ failure . high dose of Lasix started and patient was aggressively diuresed. Kidney function improved . Ammonia level dropped to less than 10.  Mental status improved and patient extubated on 11/3/2020. Patient was seen by wound specialist and decubitus ulcer debrided. patient had a drop in h/h s/p debridement . received 1 unit PRBC .   11/12  Transferred from ICU to SCU  `11/17  Midodrine d/c secondary to daptomycin being started.

## 2020-11-18 NOTE — PROGRESS NOTE ADULT - NUTRITIONAL ASSESSMENT
This patient has been assessed with a concern for Malnutrition and has been determined to have a diagnosis/diagnoses of Severe protein-calorie malnutrition.  Protein 6.0   Albumin  2.3       This patient is being managed with:   Diet Dysphagia 1 Pureed-Nectar Consistency Fluid-  Supplement Feeding Modality:  Oral  Two Papi HN Cans or Servings Per Day:  3       Frequency:  Three Times a day  Ensure Pudding Cans or Servings Per Day:  2       Frequency:  Two Times a day  Entered: Nov 13 2020  1:47PM

## 2020-11-18 NOTE — PROGRESS NOTE ADULT - PROBLEM SELECTOR PLAN 4
2/2 recurrent hospitalizations, s/p JOHNATHAN. +multi-organ failure. Was ambulatory short distances at home with increasing weakness and +significant weight loss as per family. Patient was intubated since 10/24, extubated 11/9, dependent on ADLs, + skin failure. PT and ST to eval. Patient eligible for hospice services. 2/2 recurrent hospitalizations, s/p JOHNATHAN. +multi-organ failure. Was ambulatory short distances at home with increasing weakness and +significant weight loss as per family. Patient was intubated since 10/24, extubated 11/9, dependent on ADLs, + skin failure. PT recommending JONHATHAN.

## 2020-11-18 NOTE — PROGRESS NOTE ADULT - PROBLEM SELECTOR PLAN 5
S/P sacral wound debridement  Continue fluconazole as per ID. Daptomycin started Will need 10 days of dapto. Today is day 2/10.  ID f/u   Continue wound care as ordered.  Turn every 2 hours.  OOB daily.

## 2020-11-18 NOTE — PROGRESS NOTE ADULT - CONVERSATION DETAILS
Spoke with patient's daughter Jus regarding current condition, poor overall prognosis, no significant improvement with medical treatment thus far, further goals of care. DNR on file. High risk of further decompensation and likely difficult to wean. Appropriate for no further escalation of care. She verbalized understanding. She will be visiting the hospital later. She stated she would discuss with her family. Support provided.
11/18/20: Met with patient's dtr; PC SW present. Discussed status. Daughter agreeable for [facility]ATHAN - reports top 4 choices include: 1. [facility]  2[facility]  [facility]  [facility].  Aware that pending status, she may need LTC. Again discussed goals of care and discussed risks vs benefits of resuscitation, intubation, artificial nutrition. Daughter confirms DNR status but requests trial of intubation. New MOLST completed as per daughter's wishes: DNR / trial intubation / trial feeding tube / trial IV fluids / use abx. All questions answered; supportive counseling provided.

## 2020-11-18 NOTE — PROGRESS NOTE ADULT - SUBJECTIVE AND OBJECTIVE BOX
MARIBETH PADILLA    SCU progress note    INTERVAL HPI/OVERNIGHT EVENTS: ***No overnight events. Midodrine d/c yesterday secondary to daptomycin being started.    DNR [x ]   DNI  [  ]  TRIAL OF INTUBATION    Covid - 19 PCR: Negative 11/17    The 4Ms    What Matters Most: see GOC  Age appropriate Medications/Screen for High Risk Medication: Yes  Mentation: see CAM below  Mobility: defer to physical exam    The Confusion Assessment Method (CAM) Diagnostic Algorithm     1: Acute Onset or Fluctuating Course  - Is there evidence of an acute change in mental status from the patient’s baseline? Did the (abnormal) behavior  fluctuate during the day, that is, tend to come and go, or increase and decrease in severity?  [ ] YES [x ] NO     2: Inattention  - Did the patient have difficulty focusing attention, being easily distractible, or having difficulty keeping track of what was being said?  [ ] YES [x ] NO     3: Disorganized thinking  -Was the patient’s thinking disorganized or incoherent, such as rambling or irrelevant conversation, unclear or illogical flow of ideas, or unpredictable switching from subject to subject?  [ ] YES [x ] NO    4: Altered Level of consciousness?  [ ] YES [x ] NO    The diagnosis of delirium by CAM requires the presence of features 1 and 2 and either 3 or 4.    PRESSORS: [ ] YES [x ] NO  DAPTOmycin IVPB      DAPTOmycin IVPB 350 milliGRAM(s) IV Intermittent every 24 hours  fluconAZOLE IVPB 200 milliGRAM(s) IV Intermittent every 24 hours  fluconAZOLE IVPB      rifAXIMin 550 milliGRAM(s) Oral two times a day    Cardiovascular:  Heart Failure  Acute   Acute on Chronic  Chronic       furosemide    Tablet 40 milliGRAM(s) Oral every 12 hours    Pulmonary:    Hematalogic:  enoxaparin Injectable 50 milliGRAM(s) SubCutaneous daily    Other:  ascorbic acid 500 milliGRAM(s) Oral daily  dextrose 5% 1000 milliLiter(s) IV Continuous <Continuous>  dextrose 5% 1000 milliLiter(s) IV Continuous <Continuous>  lactulose Syrup 10 Gram(s) Oral daily  multivitamin 1 Tablet(s) Oral daily  pantoprazole   Suspension 40 milliGRAM(s) Enteral Tube daily  zinc sulfate 220 milliGRAM(s) Oral daily    ascorbic acid 500 milliGRAM(s) Oral daily  DAPTOmycin IVPB      DAPTOmycin IVPB 350 milliGRAM(s) IV Intermittent every 24 hours  dextrose 5% 1000 milliLiter(s) IV Continuous <Continuous>  dextrose 5% 1000 milliLiter(s) IV Continuous <Continuous>  enoxaparin Injectable 50 milliGRAM(s) SubCutaneous daily  fluconAZOLE IVPB 200 milliGRAM(s) IV Intermittent every 24 hours  fluconAZOLE IVPB      furosemide    Tablet 40 milliGRAM(s) Oral every 12 hours  lactulose Syrup 10 Gram(s) Oral daily  multivitamin 1 Tablet(s) Oral daily  pantoprazole   Suspension 40 milliGRAM(s) Enteral Tube daily  rifAXIMin 550 milliGRAM(s) Oral two times a day  zinc sulfate 220 milliGRAM(s) Oral daily    Drug Dosing Weight  Height (cm): 167.6 (21 Oct 2020 02:26)  Weight (kg): 56.2 (21 Oct 2020 02:26)  BMI (kg/m2): 20 (21 Oct 2020 02:26)  BSA (m2): 1.63 (21 Oct 2020 02:26)    CENTRAL LINE: [ ] YES [x ] NO  LOCATION:   DATE INSERTED:  REMOVE: [ ] YES [ ] NO  EXPLAIN:    TREJO: [ ] YES [ ] NO    DATE INSERTED:  REMOVE:  [ ] YES [ ] NO  EXPLAIN:    PAST MEDICAL & SURGICAL HISTORY:  Anasarca    Pulmonary embolism    DVT (deep venous thrombosis)    SBO (small bowel obstruction)    H/O exploratory laparotomy                11-17 @ 07:01  -  11-18 @ 07:00  --------------------------------------------------------  IN: 0 mL / OUT: 1900 mL / NET: -1900 mL            PHYSICAL EXAM:    GENERAL: NAD, cachectic  HEAD:  Atraumatic, Normocephalic  EYES: EOMI, PERRLA, conjunctiva and sclera clear  ENMT: No tonsillar erythema, exudates  NECK: Supple, No JVD  NERVOUS SYSTEM:  Awake and alert. Can follow simple commands  CHEST/LUNG: Clear to percussion bilaterally; No rales, rhonchi, wheezing, or rubs  HEART: Regular rate and rhythm; No murmurs, rubs, or gallops  ABDOMEN: Soft, Nontender, Nondistended; Bowel sounds present  EXTREMITIES:  2+ Peripheral Pulses, No clubbing, cyanosis, or edema  LYMPH: No lymphadenopathy noted  SKIN: Sacral decub    LABS:  CBC Full  -  ( 18 Nov 2020 06:22 )  WBC Count : 11.15 K/uL  RBC Count : 2.68 M/uL  Hemoglobin : 7.8 g/dL  Hematocrit : 25.2 %  Platelet Count - Automated : 221 K/uL  Mean Cell Volume : 94.0 fl  Mean Cell Hemoglobin : 29.1 pg  Mean Cell Hemoglobin Concentration : 31.0 gm/dL  Auto Neutrophil # : 8.47 K/uL  Auto Lymphocyte # : 1.13 K/uL  Auto Monocyte # : 1.27 K/uL  Auto Eosinophil # : 0.17 K/uL  Auto Basophil # : 0.08 K/uL  Auto Neutrophil % : 76.0 %  Auto Lymphocyte % : 10.1 %  Auto Monocyte % : 11.4 %  Auto Eosinophil % : 1.5 %  Auto Basophil % : 0.7 %    11-18    148<H>  |  110<H>  |  33<H>  ----------------------------<  84  3.1<L>   |  33<H>  |  1.55<H>    Ca    8.0<L>      18 Nov 2020 06:22  Phos  3.0     11-18  Mg     1.8     11-18    TPro  6.0  /  Alb  2.3<L>  /  TBili  2.7<H>  /  DBili  x   /  AST  89<H>  /  ALT  49  /  AlkPhos  130<H>  11-18              [  ]  DVT Prophylaxis  [  ]  Nutrition, Brand, Rate         Goal Rate        Abnormal Nutritional Status -  Malnutrition   Cachexia         RADIOLOGY & ADDITIONAL STUDIES:  ***  < from: Xray Chest 1 View- PORTABLE-Routine (Xray Chest 1 View- PORTABLE-Routine in AM.) (11.11.20 @ 08:16) >  The lung apices are partially obscured by the patient's head and chin.    Right central venous catheter is unchanged in position.  Nasogastric tube is been removed.    The evaluation of the cardiomediastinal silhouette is limited on portable technique.    There are persistent bilateral pleural effusions with underlying airspace consolidation.    < end of copied text >    Goals of Care Discussion with Family/Proxy/Other   - see note from 10/23   MARIBETH PADILLA    SCU progress note    INTERVAL HPI/OVERNIGHT EVENTS: ***No overnight events. Midodrine d/c yesterday secondary to daptomycin being started.    DNR [x ]   DNI  [  ]  TRIAL OF INTUBATION    Covid - 19 PCR: Negative 11/17    The 4Ms    What Matters Most: see GOC  Age appropriate Medications/Screen for High Risk Medication: Yes  Mentation: see CAM below  Mobility: defer to physical exam    The Confusion Assessment Method (CAM) Diagnostic Algorithm     1: Acute Onset or Fluctuating Course  - Is there evidence of an acute change in mental status from the patient’s baseline? Did the (abnormal) behavior  fluctuate during the day, that is, tend to come and go, or increase and decrease in severity?  [ ] YES [x ] NO     2: Inattention  - Did the patient have difficulty focusing attention, being easily distractible, or having difficulty keeping track of what was being said?  [ ] YES [x ] NO     3: Disorganized thinking  -Was the patient’s thinking disorganized or incoherent, such as rambling or irrelevant conversation, unclear or illogical flow of ideas, or unpredictable switching from subject to subject?  [ ] YES [x ] NO    4: Altered Level of consciousness?  [ ] YES [x ] NO    The diagnosis of delirium by CAM requires the presence of features 1 and 2 and either 3 or 4.    PRESSORS: [ ] YES [x ] NO  DAPTOmycin IVPB      DAPTOmycin IVPB 350 milliGRAM(s) IV Intermittent every 24 hours  fluconAZOLE IVPB 200 milliGRAM(s) IV Intermittent every 24 hours  fluconAZOLE IVPB      rifAXIMin 550 milliGRAM(s) Oral two times a day    Cardiovascular:  Heart Failure  Acute   Acute on Chronic  Chronic       furosemide    Tablet 40 milliGRAM(s) Oral every 12 hours    Pulmonary:    Hematalogic:  enoxaparin Injectable 50 milliGRAM(s) SubCutaneous daily    Other:  ascorbic acid 500 milliGRAM(s) Oral daily  dextrose 5% 1000 milliLiter(s) IV Continuous <Continuous>  dextrose 5% 1000 milliLiter(s) IV Continuous <Continuous>  lactulose Syrup 10 Gram(s) Oral daily  multivitamin 1 Tablet(s) Oral daily  pantoprazole   Suspension 40 milliGRAM(s) Enteral Tube daily  zinc sulfate 220 milliGRAM(s) Oral daily    ascorbic acid 500 milliGRAM(s) Oral daily  DAPTOmycin IVPB      DAPTOmycin IVPB 350 milliGRAM(s) IV Intermittent every 24 hours  dextrose 5% 1000 milliLiter(s) IV Continuous <Continuous>  dextrose 5% 1000 milliLiter(s) IV Continuous <Continuous>  enoxaparin Injectable 50 milliGRAM(s) SubCutaneous daily  fluconAZOLE IVPB 200 milliGRAM(s) IV Intermittent every 24 hours  fluconAZOLE IVPB      furosemide    Tablet 40 milliGRAM(s) Oral every 12 hours  lactulose Syrup 10 Gram(s) Oral daily  multivitamin 1 Tablet(s) Oral daily  pantoprazole   Suspension 40 milliGRAM(s) Enteral Tube daily  rifAXIMin 550 milliGRAM(s) Oral two times a day  zinc sulfate 220 milliGRAM(s) Oral daily    Drug Dosing Weight  Height (cm): 167.6 (21 Oct 2020 02:26)  Weight (kg): 56.2 (21 Oct 2020 02:26)  BMI (kg/m2): 20 (21 Oct 2020 02:26)  BSA (m2): 1.63 (21 Oct 2020 02:26)    CENTRAL LINE: [ ] YES [x ] NO  LOCATION:   DATE INSERTED:  REMOVE: [ ] YES [ ] NO  EXPLAIN:    TREJO: [ ] YES [ ] NO    DATE INSERTED:  REMOVE:  [ ] YES [ ] NO  EXPLAIN:    PAST MEDICAL & SURGICAL HISTORY:  Anasarca    Pulmonary embolism    DVT (deep venous thrombosis)    SBO (small bowel obstruction)    H/O exploratory laparotomy                11-17 @ 07:01  -  11-18 @ 07:00  --------------------------------------------------------  IN: 0 mL / OUT: 1900 mL / NET: -1900 mL            PHYSICAL EXAM:    GENERAL: NAD, cachectic  HEAD:  Atraumatic, Normocephalic  EYES: EOMI, PERRLA, conjunctiva and sclera clear  ENMT: No tonsillar erythema, exudates  NECK: Supple, No JVD  NERVOUS SYSTEM:  Awake and alert. Can follow simple commands  CHEST/LUNG: Clear to percussion bilaterally; No rales, rhonchi, wheezing, or rubs  HEART: Regular rate and rhythm; No murmurs, rubs, or gallops  ABDOMEN: Soft, Nontender, Nondistended; Bowel sounds present  EXTREMITIES:  2+ Peripheral Pulses, No clubbing, cyanosis, or edema  LYMPH: No lymphadenopathy noted  SKIN: Sacral decub    LABS:  CBC Full  -  ( 18 Nov 2020 06:22 )  WBC Count : 11.15 K/uL  RBC Count : 2.68 M/uL  Hemoglobin : 7.8 g/dL  Hematocrit : 25.2 %  Platelet Count - Automated : 221 K/uL  Mean Cell Volume : 94.0 fl  Mean Cell Hemoglobin : 29.1 pg  Mean Cell Hemoglobin Concentration : 31.0 gm/dL  Auto Neutrophil # : 8.47 K/uL  Auto Lymphocyte # : 1.13 K/uL  Auto Monocyte # : 1.27 K/uL  Auto Eosinophil # : 0.17 K/uL  Auto Basophil # : 0.08 K/uL  Auto Neutrophil % : 76.0 %  Auto Lymphocyte % : 10.1 %  Auto Monocyte % : 11.4 %  Auto Eosinophil % : 1.5 %  Auto Basophil % : 0.7 %    11-18    148<H>  |  110<H>  |  33<H>  ----------------------------<  84  3.1<L>   |  33<H>  |  1.55<H>    Ca    8.0<L>      18 Nov 2020 06:22  Phos  3.0     11-18  Mg     1.8     11-18    TPro  6.0  /  Alb  2.3<L>  /  TBili  2.7<H>  /  DBili  x   /  AST  89<H>  /  ALT  49  /  AlkPhos  130<H>  11-18              [  ]  DVT Prophylaxis  [  ]  Nutrition, Brand, Rate         Goal Rate        Abnormal Nutritional Status -  Malnutrition   Cachexia         RADIOLOGY & ADDITIONAL STUDIES:  ***  < from: Xray Chest 1 View- PORTABLE-Routine (Xray Chest 1 View- PORTABLE-Routine in AM.) (11.11.20 @ 08:16) >  The lung apices are partially obscured by the patient's head and chin.    Right central venous catheter is unchanged in position.  Nasogastric tube is been removed.    The evaluation of the cardiomediastinal silhouette is limited on portable technique.    There are persistent bilateral pleural effusions with underlying airspace consolidation.    < end of copied text >  ct< from: CT Chest w/ IV Cont (10.21.20 @ 16:03) >  Upon further review, there is apparent mild luminal narrowing at the distal small bowel, probably due to muralthickening (image 83 and 100 series 2). This may be represent the transition point for the differential consideration of small bowel obstruction. Ileus is still in the differential consideration because oral contrast has reached the terminal ileum. Dilatation of the proximal ascending colon measuring 7.9 cm in diameter.    Upon further review, there is a mildly enlarged 1.2 cm peripancreatic lymph node.    Case discussed with Dr. Dean.    *** END OF ADDENDUM 10/21/2020  ***      PROCEDURE DATE:  10/21/2020          INTERPRETATION:  CT of the chest, abdomen, and pelvis with IV contrast    Indication: Shortness of breath. Clinical concern for bowel obstruction. Sepsis.    Technique: Axial multidetector CT images of the chest, abdomen, and pelvisare acquired following the administration of oral contrast and IV contrast (90 cc Omnipaque-350 administered, 10 cc discarded).    Comparison: No prior chest CT is available for comparison. Abdominal/pelvic CT dated 10/13/2020.    Findings:    Chest:Mild bilateral pleural effusions. No pericardial effusion. The heart is not enlarged. Aortic calcifications are present. No evidence for aortic dissection. Ectasia of the ascending aorta at 3.8 cm in diameter. Retroesophageal right aberrant subclavian artery. Possible 1.0 cm hypodense lesion in the left lobe of the thyroid. Thyroid ultrasound may be pursued for further evaluation. No enlarged mediastinal, hilar, or axillary lymph node.    The trachea and central bronchi are patent.    No evidenceof pneumothorax. Combination of atelectasis and consolidation in the left lower lobe. Mild atelectasis in other lung fields bilaterally. Small calcified granuloma in the lingula. 5 mm nonspecific noncalcified left upper lobe lung nodule (image 59 series 3); if the patient's is in the high risk category (i.e. smoker), follow-up chest CT may be pursued in 12 months to ensure stability.    Abdomen/pelvis: Respiratory motion artifact limits evaluation. Gallstones are again seen in the gallbladder. Evaluation of the gallbladder wall is limited by respiratory motion artifact. The liver, common bile duct, pancreas, spleen, adrenals and left kidney appear unremarkable. Small hypodense lesion in the right kidney, too small to characterize.    The appendix appears unremarkable. Mural thickening of the left colon and rectum. Liquid stool in the colon. Oral contrast has reached the terminal ileum. Apparent segmental mural thickening of the mid to distal small bowel and the proximal duodenum. Dilatation of the mid small bowel is again noted.    No evidence for free air or enlarged lymph node. Moderate to large ascites in the abdomen, increased since the previous examination.    The celiac axis artery, SMA, and KINA are patent without stenosis. IVC filter is again noted.    Trejo catheter in the urinary bladder. The uterus appears unremarkable.    Skeleton: Aging determinate compression fracture at T5 vertebra. Degenerative spondylosis.    Impression:    Mural thickening of the left colon and rectum. Liquid stool in the colon. Apparent segmental mural thickening of the mid to distal small bowel and the proximal duodenum. Findings may represent nonspecific enterocolitis, noninfectious inflammatory bowel disease, or ischemic bowel. Clinical correlation is recommended. The celiac axis artery, SMA, and KINA are patent without stenosis.    Dilatation of the mid small bowel is again noted. Oral contrast has reached the terminal ileum. Findings may represent small bowel obstruction, or ileus related to nonspecific enterocolitis.    Cholelithiasis.    Moderate to large ascites in the abdomen, increased since the previous examination.    5 mm nonspecific noncalcified left upper lobe lung nodule; if the patient's is in the high risk category (i.e. smoker), follow-up chest CT may be pursued in 12 months to ensure stability.    Combination of atelectasis and consolidation in the left lower lobe.    Possible 1.0 cm hypodense lesion in the left lobe of the thyroid. Thyroid ultrasound may be pursued for further evaluation.    Mild bilateral pleural effusions.    Aging determinate compression fracture at T5 vertebra.      < end of copied text >    Goals of Care Discussion with Family/Proxy/Other   - see note from 10/23   Declined

## 2020-11-18 NOTE — GOALS OF CARE CONVERSATION - ADVANCED CARE PLANNING - CONVERSATION DETAILS
PC NP and PC Sw PC NP and PC  had a family meeting with the patient's daughter Jus Bucio who is the NOKS.  PC NP provided a clinical update stating, as per P.T., the patient was able to take a few steps and is being recommended for JOHNATHAN.  At this time the patient requires total care therefore, may need long term placement post JOHNATHAN.  PC SW educated the patient's daughter regarding transitioning community medicaid to SNF medicaid, should the patient require long term care in a SNF.  Ms. Bucio verbalized understanding and stated she has chosen 4 JOHNATHAN's which the NP relayed to case mgt.  Ms. Bucio stated she feels supported by her brother who arrived in [place] last week.  PC NP reviewed the patient's code status which is DNR, trial of intubation.  MOLST reflects the patient's wishes. Emotional support was provided as well as this sw's contact information.  No addl. needs were expressed.   will remain available as needed.

## 2020-11-18 NOTE — PROGRESS NOTE ADULT - PROBLEM SELECTOR PLAN 7
Encourage oral intake.  Oral intake remains poor.  Assist with feedings.  Continue 2Cal HN and ensure pudding.

## 2020-11-18 NOTE — PROGRESS NOTE ADULT - ASSESSMENT
Patient is a 65y old  Female from home, lives with daughter, ambulates with walker with PMH of recurrent SBO's, s/p exp lap, SB resection in 2015, ex lap, ALBINA in 2018, DVT, PE, on Xarelto, IVC filter, chronic leg swelling, and anasarca, Now send in to the ER by her PCP, Dr. Ross, for evaluation of  generalized weakness and hypotension BP of 80/40 during office visit. On admission, she found to have no fever and BP was in lower normal range and no Leukocytosis. The CXR is clear and CT abd/pelvis consistent with Ileus. The ID consult requested to assist with evaluation of infectious etiology of episode of hypotension. Found to have Bacteroides bacteremia and now developed septic shock on Cefepime and Flagyl. Hence transferred to ICU. 10/20/20.    # Hypotension  at office- BP of 80/40 - resolved   #  Bacteroides fragilis Bacteremia - 10/13/20 - ? source most likely GI- Repeat Blood Cxs have NGTD 10/16/20  # Ileus  - h/o recurrent SBO and s/p EXp. LAp  # COVID 19 negative   # Septic shock ( Hypothermia + hypotensive)- transferred to ICU since requiring pressor- source GI, The CT abd/pelvis shows nonspecific enterocolitis, noninfectious inflammatory bowel disease, or ischemic bowel, along with ileus.   # Pneumonia- HCAP vs Aspiration - on CXR 10/24 and CT chest 10/21- sputum Cx grew Methicillin resistant Staphylococcus aureus  and Stenotrophomonas maltophilia.  # S/p extubated 11/9/20  # Infected sacral ulcer- s/p debridement and culture grew Enterococcus and Candida, sensitivity is pending      would recommend:    1. Continue  Daptomycin to cover VRE  and  Monitor CPK level  2. Continue Fluconazole to cover Candida in wound culture  3. Monitor kidney function, is improving    4. Aspiration precaution  5. Continue Sacral Wound care  and Frequent Repositioning       d/w covering NP,  Camime and Dr. Burch     Attending Attestation:    Spent more than 45 minutes on total encounter, more than 50 % of the visit was spent counseling and/or coordinating care by the Attending physician.   Patient is a 65y old  Female from home, lives with daughter, ambulates with walker with PMH of recurrent SBO's, s/p exp lap, SB resection in 2015, ex lap, ALBINA in 2018, DVT, PE, on Xarelto, IVC filter, chronic leg swelling, and anasarca, Now send in to the ER by her PCP, Dr. Ross, for evaluation of  generalized weakness and hypotension BP of 80/40 during office visit. On admission, she found to have no fever and BP was in lower normal range and no Leukocytosis. The CXR is clear and CT abd/pelvis consistent with Ileus. The ID consult requested to assist with evaluation of infectious etiology of episode of hypotension. Found to have Bacteroides bacteremia and now developed septic shock on Cefepime and Flagyl. Hence transferred to ICU. 10/20/20.    # Hypotension  at office- BP of 80/40 - resolved   #  Bacteroides fragilis Bacteremia - 10/13/20 - ? source most likely GI- Repeat Blood Cxs have NGTD 10/16/20  # Ileus  - h/o recurrent SBO and s/p EXp. LAp  # COVID 19 negative   # Septic shock ( Hypothermia + hypotensive)- transferred to ICU since requiring pressor- source GI, The CT abd/pelvis shows nonspecific enterocolitis, noninfectious inflammatory bowel disease, or ischemic bowel, along with ileus.   # Pneumonia- HCAP vs Aspiration - on CXR 10/24 and CT chest 10/21- sputum Cx grew Methicillin resistant Staphylococcus aureus  and Stenotrophomonas maltophilia.  # S/p extubated 11/9/20  # Infected sacral ulcer- s/p debridement and culture grew Enterococcus and Candida, sensitivity is pending      would recommend:    1. Please change velazquez catheter, may benefit to continue in the setting of sacral ulcer   2. Continue  Daptomycin to cover VRE  and  Monitor CPK level  3. Continue Fluconazole to cover Candida in wound culture  4. Monitor kidney function, is improving    5. Aspiration precaution  6. Continue Sacral Wound care  and Frequent Repositioning     d/w Covering NP and Nursing staff    Attending Attestation:    Spent more than 45 minutes on total encounter, more than 50 % of the visit was spent counseling and/or coordinating care by the Attending physician.

## 2020-11-18 NOTE — PROGRESS NOTE ADULT - PROBLEM SELECTOR PLAN 1
2/2 bacteremia; involving lungs (patient intubated 10/24; extubated 11/9; now on nasal canula); heart (echo shows EF 15-20%); liver (TB 2.7, AST 89, ); kidneys (DAHLIA, Cr 1.55). Patient extubated 11/9; on nasal canula. Continues IV abx, diflucan, rifaximin, IV abx. 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 Overall, patient with very poor prognosis. Patient eligible for hospice services. Daughter/Jus is surrogate; DNR on file. 2/2 bacteremia; involving heart (echo shows EF 15-20%); liver (TB 2.7, AST 89, ); kidneys (DAHLIA, Cr 1.55). Patient extubated 11/9; on RA. Continues IV abx, diflucan, rifaximin. 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 Overall, patient with very poor prognosis. Patient for JOHNATHAN. Daughter/Jus is surrogate; DNR on file.

## 2020-11-18 NOTE — PROGRESS NOTE ADULT - PROBLEM SELECTOR PLAN 5
Family meeting planned for 2:30 with daughter to discuss discharge planning and goals of care. See GOC section above. Patient's daughter/Jus Bucio is primary surrogate ([phone]). New MOLST completed as per daughter's wishes:  DNR / trial intubation/ trial feeding tube / trial IV fluids / use abx. For JOHNATHAN.

## 2020-11-18 NOTE — PROGRESS NOTE ADULT - SUBJECTIVE AND OBJECTIVE BOX
Patient is seen and examined at the bed side, is afebrile.  No new events.  The WBC count is trending back down. The sacral wound culture grew Enterococcus faecium, VRE and only sensitive to Linezolid but patient has major D-D interaction with Midodrine.       REVIEW OF SYSTEMS: All other review systems are negative        ALLERGIES: No Known Allergies      Vital Signs Last 24 Hrs  T(C): 37.1 (18 Nov 2020 12:04), Max: 37.7 (17 Nov 2020 20:17)  T(F): 98.7 (18 Nov 2020 12:04), Max: 99.8 (17 Nov 2020 20:17)  HR: 83 (18 Nov 2020 12:04) (83 - 95)  BP: 128/76 (18 Nov 2020 12:04) (112/65 - 128/76)  BP(mean): --  RR: 18 (18 Nov 2020 12:04) (16 - 18)  SpO2: 100% (18 Nov 2020 12:04) (100% - 100%)      PHYSICAL EXAM:  GENERAL: Not in distress, on oxygen via NC  CHEST/LUNG: Not using accessory muscles   HEART: s1 and s2 present  ABDOMEN:  Nontender and  Nondistended  EXTREMITIES: B/L UE edematous  CNS: Awake and Alert         LABS:                        7.8    11.15 )-----------( 221      ( 18 Nov 2020 06:22 )             25.2                           8.3    10.77 )-----------( 208      ( 17 Nov 2020 10:30 )             26.4                           9.4    13.14 )-----------( 149      ( 14 Nov 2020 07:58 )             28.7       11-18    148<H>  |  110<H>  |  33<H>  ----------------------------<  84  3.1<L>   |  33<H>  |  1.55<H>    Ca    8.0<L>      18 Nov 2020 06:22  Phos  3.0     11-18  Mg     1.8     11-18    TPro  6.0  /  Alb  2.3<L>  /  TBili  2.7<H>  /  DBili  x   /  AST  89<H>  /  ALT  49  /  AlkPhos  130<H>  11-18 11-17    147<H>  |  108  |  35<H>  ----------------------------<  74  3.1<L>   |  32<H>  |  1.58<H>    Ca    7.6<L>      17 Nov 2020 10:30  Phos  3.3     11-17  Mg     1.7     11-17 11-06    138  |  104  |  37<H>  ----------------------------<  81  3.9   |  25  |  2.37<H>    Ca    8.1<L>      06 Nov 2020 06:20  Phos  3.4     11-06  Mg     2.0     11-06    TPro  5.1<L>  /  Alb  2.8<L>  /  TBili  9.2<H>  /  DBili  x   /  AST  233<H>  /  ALT  99<H>  /  AlkPhos  96  11-06      Vancomycin Level, Trough (11.07.20 @ 21:49)   Vancomycin Level, Trough: 16.1:     Vancomycin Level, Trough (11.07.20 @ 07:08)   Vancomycin Level, Trough: 19.5    vanVancomycin Level, Trough (11.06.20 @ 06:20)   Vancomycin Level, Trough: 19.9:         MEDICATIONS  (STANDING):    MEDICATIONS  (STANDING):  ascorbic acid 500 milliGRAM(s) Oral daily  DAPTOmycin IVPB      DAPTOmycin IVPB 350 milliGRAM(s) IV Intermittent every 24 hours  dextrose 5% 1000 milliLiter(s) (50 mL/Hr) IV Continuous <Continuous>  dextrose 5% 1000 milliLiter(s) (50 mL/Hr) IV Continuous <Continuous>  enoxaparin Injectable 50 milliGRAM(s) SubCutaneous daily  fluconAZOLE IVPB 200 milliGRAM(s) IV Intermittent every 24 hours  fluconAZOLE IVPB      furosemide    Tablet 40 milliGRAM(s) Oral daily  lactulose Syrup 10 Gram(s) Oral daily  multivitamin 1 Tablet(s) Oral daily  pantoprazole   Suspension 40 milliGRAM(s) Enteral Tube daily  rifAXIMin 550 milliGRAM(s) Oral two times a day  zinc sulfate 220 milliGRAM(s) Oral daily          RADIOLOGY & ADDITIONAL TESTS:    11/4/20 : Xray Chest 1 View- PORTABLE-Routine (Xray Chest 1 View- PORTABLE-Routine in AM.) (11.04.20 @ 10:59) Reexpansion of the left lung with residual left pleural effusion and/or infiltrate.  Right pulmonary edema unchanged.  Tubes and catheters in satisfactory position.      10/25/20: Xray Chest 1 View- PORTABLE-Routine (Xray Chest 1 View- PORTABLE-Routine in AM.) (10.25.20 @ 09:21) Frontal expiratory view of the chest shows the heart to be similar in size. Endotracheal tube, right jugular line and feeding tube remain present.    The lungs show similar left upper lobe infiltrate with progression of right perihilar infiltrate. Pleural effusions are similar. There is no evidence of pneumothorax.      10/23/20 : Xray Chest 1 View-PORTABLE IMMEDIATE (Xray Chest 1 View-PORTABLE IMMEDIATE .) (10.23.20 @ 19:09) Feeding tube tip near GE junction as noted. Questionable right upper lobe infiltrate. Follow-up study is recommended as clinically warranted.      10/21/20 : CT Abdomen and Pelvis w/ Oral Cont and w/ IV Cont (10.21.20 @ 15:59) Mural thickening of the left colon and rectum. Liquid stool in the colon. Apparent segmental mural thickening of the mid to distal small bowel and the proximal duodenum. Findings may represent nonspecific enterocolitis, noninfectious inflammatory bowel disease, or ischemic bowel. Clinical correlation is recommended. The celiac axis artery, SMA, and KINA are patent without stenosis.    Dilatation of the mid small bowel is again noted. Oral contrast has reached the terminal ileum. Findings may represent small bowel obstruction, or ileus related to nonspecific enterocolitis.   Cholelithiasis.    Moderate to large ascites in the abdomen, increased since the previous examination. 5 mm nonspecific noncalcified left upper lobe lung nodule; if the patient's is in the high risk category (i.e. smoker), follow-up chest CT may be pursued in 12 months to ensure stability.    Combination of atelectasis and consolidation in the left lower lobe.    Possible 1.0 cm hypodense lesion in the left lobe of the thyroid. Thyroid ultrasound may be pursued for further evaluation.   Mild bilateral pleural effusions.    Aging determinate compression fracture at T5 vertebra.        10/13/20 : CT Abdomen and Pelvis w/ IV Cont (10.13.20 @ 18:50) Diffuse dilatation of small bowel loops without a clear transition is slightly increased, likely an ileus.  Decreased moderate ascites.    1.5 cm pancreatic versus peripancreatic soft tissue nodule    10/13/20 : Xray Chest 1 View- PORTABLE-Urgent (Xray Chest 1 View- PORTABLE-Urgent .) (10.13.20 @ 16:34) Clear lungs.          MICROBIOLOGY DATA:    Culture - Surgical Swab (11.12.20 @ 05:01)   Specimen Source: .Surgical Swab Sacral decub   Culture Results:   Few Enterococcus faecium Susceptibility to follow.   Rare Candida albicans "Susceptibilities not performed"     Culture - Sputum . (10.26.20 @ 00:26)   - Ampicillin/Sulbactam: R <=8/4   - Cefazolin: R >16   - Ceftazidime: I 16   - Clindamycin: R >4   - Erythromycin: R >4   - Gentamicin: S <=1 Should not be used as monotherapy   - Levofloxacin: S <=0.5   - Linezolid: S 2   - Oxacillin: R >2   - Penicillin: R >8   - RIF- Rifampin: S <=1 Should not be used as monotherapy   - Tetra/Doxy: S <=1   - Trimethoprim/Sulfamethoxazole: S <=0.5/9.5   - Trimethoprim/Sulfamethoxazole: S <=0.5/9.5   - Vancomycin: S 1       MRSA/MSSA PCR (10.21.20 @ 09:41)   MRSA PCR Result.: Detected:       Culture - Blood (10.20.20 @ 22:09)   Specimen Source: .Blood Blood   Culture Results:  No growth to date.     Culture - Blood (10.20.20 @ 22:09)   Specimen Source: .Blood Blood   Culture Results:   No growth to date.     Culture - Blood in AM (10.16.20 @ 10:13)   Specimen Source: .Blood Blood-Peripheral   Culture Results: No growth to date.     Culture - Blood in AM (10.16.20 @ 10:13)   Specimen Source: .Blood Blood-Peripheral   Culture Results: No growth to date.     Culture - Blood (10.13.20 @ 22:23)   Growth in anaerobic bottle: Gram Negative Rods   Specimen Source: .Blood Blood-Peripheral   Organism: Blood Culture PCR   Culture Results: Growth in anaerobic bottle: Bacteroides fragilis   "Susceptibilities not performed"     Culture - Blood (10.13.20 @ 22:23)   Specimen Source: .Blood Blood-Peripheral   Culture Results:   No growth to date.                  Patient is seen and examined at the bed side, is afebrile.  She is OFF oxygen today, saturating well at room Air. The Sacral ulcer has that yellow fibrinous material.       REVIEW OF SYSTEMS: All other review systems are negative        ALLERGIES: No Known Allergies        Vital Signs Last 24 Hrs  T(C): 37.1 (18 Nov 2020 12:04), Max: 37.7 (17 Nov 2020 20:17)  T(F): 98.7 (18 Nov 2020 12:04), Max: 99.8 (17 Nov 2020 20:17)  HR: 83 (18 Nov 2020 12:04) (83 - 95)  BP: 128/76 (18 Nov 2020 12:04) (112/65 - 128/76)  BP(mean): --  RR: 18 (18 Nov 2020 12:04) (16 - 18)  SpO2: 100% (18 Nov 2020 12:04) (100% - 100%)        PHYSICAL EXAM:  GENERAL: Not in distress, on oxygen via NC  CHEST/LUNG: Not using accessory muscles   HEART: s1 and s2 present  ABDOMEN:  Nontender and  Nondistended  EXTREMITIES: B/L UE edematous  CNS: Awake and Alert         LABS:                        7.8    11.15 )-----------( 221      ( 18 Nov 2020 06:22 )             25.2                           8.3    10.77 )-----------( 208      ( 17 Nov 2020 10:30 )             26.4                           9.4    13.14 )-----------( 149      ( 14 Nov 2020 07:58 )             28.7       11-18    148<H>  |  110<H>  |  33<H>  ----------------------------<  84  3.1<L>   |  33<H>  |  1.55<H>    Ca    8.0<L>      18 Nov 2020 06:22  Phos  3.0     11-18  Mg     1.8     11-18    TPro  6.0  /  Alb  2.3<L>  /  TBili  2.7<H>  /  DBili  x   /  AST  89<H>  /  ALT  49  /  AlkPhos  130<H>  11-18 11-17    147<H>  |  108  |  35<H>  ----------------------------<  74  3.1<L>   |  32<H>  |  1.58<H>    Ca    7.6<L>      17 Nov 2020 10:30  Phos  3.3     11-17  Mg     1.7     11-17 11-06    138  |  104  |  37<H>  ----------------------------<  81  3.9   |  25  |  2.37<H>    Ca    8.1<L>      06 Nov 2020 06:20  Phos  3.4     11-06  Mg     2.0     11-06    TPro  5.1<L>  /  Alb  2.8<L>  /  TBili  9.2<H>  /  DBili  x   /  AST  233<H>  /  ALT  99<H>  /  AlkPhos  96  11-06      Vancomycin Level, Trough (11.07.20 @ 21:49)   Vancomycin Level, Trough: 16.1:     Vancomycin Level, Trough (11.07.20 @ 07:08)   Vancomycin Level, Trough: 19.5    vanVancomycin Level, Trough (11.06.20 @ 06:20)   Vancomycin Level, Trough: 19.9:         MEDICATIONS  (STANDING):    MEDICATIONS  (STANDING):  ascorbic acid 500 milliGRAM(s) Oral daily  DAPTOmycin IVPB      DAPTOmycin IVPB 350 milliGRAM(s) IV Intermittent every 24 hours  dextrose 5% 1000 milliLiter(s) (50 mL/Hr) IV Continuous <Continuous>  dextrose 5% 1000 milliLiter(s) (50 mL/Hr) IV Continuous <Continuous>  enoxaparin Injectable 50 milliGRAM(s) SubCutaneous daily  fluconAZOLE IVPB 200 milliGRAM(s) IV Intermittent every 24 hours  fluconAZOLE IVPB      furosemide    Tablet 40 milliGRAM(s) Oral daily  lactulose Syrup 10 Gram(s) Oral daily  multivitamin 1 Tablet(s) Oral daily  pantoprazole   Suspension 40 milliGRAM(s) Enteral Tube daily  rifAXIMin 550 milliGRAM(s) Oral two times a day  zinc sulfate 220 milliGRAM(s) Oral daily          RADIOLOGY & ADDITIONAL TESTS:    11/4/20 : Xray Chest 1 View- PORTABLE-Routine (Xray Chest 1 View- PORTABLE-Routine in AM.) (11.04.20 @ 10:59) Reexpansion of the left lung with residual left pleural effusion and/or infiltrate.  Right pulmonary edema unchanged.  Tubes and catheters in satisfactory position.      10/25/20: Xray Chest 1 View- PORTABLE-Routine (Xray Chest 1 View- PORTABLE-Routine in AM.) (10.25.20 @ 09:21) Frontal expiratory view of the chest shows the heart to be similar in size. Endotracheal tube, right jugular line and feeding tube remain present.    The lungs show similar left upper lobe infiltrate with progression of right perihilar infiltrate. Pleural effusions are similar. There is no evidence of pneumothorax.      10/23/20 : Xray Chest 1 View-PORTABLE IMMEDIATE (Xray Chest 1 View-PORTABLE IMMEDIATE .) (10.23.20 @ 19:09) Feeding tube tip near GE junction as noted. Questionable right upper lobe infiltrate. Follow-up study is recommended as clinically warranted.      10/21/20 : CT Abdomen and Pelvis w/ Oral Cont and w/ IV Cont (10.21.20 @ 15:59) Mural thickening of the left colon and rectum. Liquid stool in the colon. Apparent segmental mural thickening of the mid to distal small bowel and the proximal duodenum. Findings may represent nonspecific enterocolitis, noninfectious inflammatory bowel disease, or ischemic bowel. Clinical correlation is recommended. The celiac axis artery, SMA, and KINA are patent without stenosis.    Dilatation of the mid small bowel is again noted. Oral contrast has reached the terminal ileum. Findings may represent small bowel obstruction, or ileus related to nonspecific enterocolitis.   Cholelithiasis.    Moderate to large ascites in the abdomen, increased since the previous examination. 5 mm nonspecific noncalcified left upper lobe lung nodule; if the patient's is in the high risk category (i.e. smoker), follow-up chest CT may be pursued in 12 months to ensure stability.    Combination of atelectasis and consolidation in the left lower lobe.    Possible 1.0 cm hypodense lesion in the left lobe of the thyroid. Thyroid ultrasound may be pursued for further evaluation.   Mild bilateral pleural effusions.    Aging determinate compression fracture at T5 vertebra.        10/13/20 : CT Abdomen and Pelvis w/ IV Cont (10.13.20 @ 18:50) Diffuse dilatation of small bowel loops without a clear transition is slightly increased, likely an ileus.  Decreased moderate ascites.    1.5 cm pancreatic versus peripancreatic soft tissue nodule    10/13/20 : Xray Chest 1 View- PORTABLE-Urgent (Xray Chest 1 View- PORTABLE-Urgent .) (10.13.20 @ 16:34) Clear lungs.          MICROBIOLOGY DATA:    Culture - Surgical Swab (11.12.20 @ 05:01)   Specimen Source: .Surgical Swab Sacral decub   Culture Results:   Few Enterococcus faecium Susceptibility to follow.   Rare Candida albicans "Susceptibilities not performed"     Culture - Sputum . (10.26.20 @ 00:26)   - Ampicillin/Sulbactam: R <=8/4   - Cefazolin: R >16   - Ceftazidime: I 16   - Clindamycin: R >4   - Erythromycin: R >4   - Gentamicin: S <=1 Should not be used as monotherapy   - Levofloxacin: S <=0.5   - Linezolid: S 2   - Oxacillin: R >2   - Penicillin: R >8   - RIF- Rifampin: S <=1 Should not be used as monotherapy   - Tetra/Doxy: S <=1   - Trimethoprim/Sulfamethoxazole: S <=0.5/9.5   - Trimethoprim/Sulfamethoxazole: S <=0.5/9.5   - Vancomycin: S 1       MRSA/MSSA PCR (10.21.20 @ 09:41)   MRSA PCR Result.: Detected:       Culture - Blood (10.20.20 @ 22:09)   Specimen Source: .Blood Blood   Culture Results:  No growth to date.     Culture - Blood (10.20.20 @ 22:09)   Specimen Source: .Blood Blood   Culture Results:   No growth to date.     Culture - Blood in AM (10.16.20 @ 10:13)   Specimen Source: .Blood Blood-Peripheral   Culture Results: No growth to date.     Culture - Blood in AM (10.16.20 @ 10:13)   Specimen Source: .Blood Blood-Peripheral   Culture Results: No growth to date.     Culture - Blood (10.13.20 @ 22:23)   Growth in anaerobic bottle: Gram Negative Rods   Specimen Source: .Blood Blood-Peripheral   Organism: Blood Culture PCR   Culture Results: Growth in anaerobic bottle: Bacteroides fragilis   "Susceptibilities not performed"     Culture - Blood (10.13.20 @ 22:23)   Specimen Source: .Blood Blood-Peripheral   Culture Results:   No growth to date.

## 2020-11-18 NOTE — PROGRESS NOTE ADULT - PROBLEM SELECTOR PLAN 2
Multifactorial - patient with multi-organ failure, elevated LFTs; in the context of underlying dementia. Patient A&O x 1-2, follows simple commands. Daughter is surrogate. Multifactorial - patient with multi-organ failure, elevated LFTs; in the context of underlying dementia. Patient A&O x 1-2, follows simple commands. Daughter is surrogate. Patient for JOHNATHAN; requires 24 hr care.

## 2020-11-19 NOTE — PROGRESS NOTE ADULT - SUBJECTIVE AND OBJECTIVE BOX
had fever last night  no cough  has some diarrhea  no sob      ROS:  Negative except for:    MEDICATIONS  (STANDING):  ascorbic acid 500 milliGRAM(s) Oral daily  DAPTOmycin IVPB      DAPTOmycin IVPB 350 milliGRAM(s) IV Intermittent every 24 hours  dextrose 5% 1000 milliLiter(s) (50 mL/Hr) IV Continuous <Continuous>  enoxaparin Injectable 50 milliGRAM(s) SubCutaneous daily  fluconAZOLE IVPB 200 milliGRAM(s) IV Intermittent every 24 hours  fluconAZOLE IVPB      furosemide    Tablet 40 milliGRAM(s) Oral daily  lactulose Syrup 10 Gram(s) Oral daily  multivitamin 1 Tablet(s) Oral daily  pantoprazole   Suspension 40 milliGRAM(s) Enteral Tube daily  rifAXIMin 550 milliGRAM(s) Oral two times a day  zinc sulfate 220 milliGRAM(s) Oral daily    MEDICATIONS  (PRN):      Allergies    No Known Allergies    Intolerances        Vital Signs Last 24 Hrs  T(C): 37.3 (19 Nov 2020 05:15), Max: 38.6 (18 Nov 2020 20:18)  T(F): 99.1 (19 Nov 2020 05:15), Max: 101.5 (18 Nov 2020 20:18)  HR: 89 (19 Nov 2020 05:15) (83 - 95)  BP: 111/65 (19 Nov 2020 05:15) (111/65 - 128/76)  BP(mean): --  RR: 16 (19 Nov 2020 05:15) (16 - 18)  SpO2: 100% (19 Nov 2020 05:15) (100% - 100%)    PHYSICAL EXAM  General: adult in NAD  HEENT: clear oropharynx, anicteric sclera, pink conjunctiva  Neck: supple  CV: normal S1/S2 with no murmur rubs or gallops  Lungs: positive air movement b/l ant lungs,clear to auscultation, no wheezes, no rales  Abdomen: soft non-tender non-distended, no hepatosplenomegaly  Ext: no clubbing cyanosis or edema  Skin: no rashes and no petechiae  Neuro: alert and oriented X 4, no focal deficits  LABS:                          8.0    11.12 )-----------( 254      ( 19 Nov 2020 06:44 )             25.5         Mean Cell Volume : 92.7 fl  Mean Cell Hemoglobin : 29.1 pg  Mean Cell Hemoglobin Concentration : 31.4 gm/dL  Auto Neutrophil # : 8.61 K/uL  Auto Lymphocyte # : 1.30 K/uL  Auto Monocyte # : 0.97 K/uL  Auto Eosinophil # : 0.11 K/uL  Auto Basophil # : 0.10 K/uL  Auto Neutrophil % : 77.4 %  Auto Lymphocyte % : 11.7 %  Auto Monocyte % : 8.7 %  Auto Eosinophil % : 1.0 %  Auto Basophil % : 0.9 %    Serial CBC  Hematocrit 25.5  Hemoglobin 8.0  Plat 254  RBC 2.75  WBC 11.12  Serial CBC  Hematocrit 25.2  Hemoglobin 7.8  Plat 221  RBC 2.68  WBC 11.15  Serial CBC  Hematocrit 26.4  Hemoglobin 8.3  Plat 208  RBC 2.82  WBC 10.77  Serial CBC  Hematocrit 25.9  Hemoglobin 8.2  Plat 192  RBC 2.82  WBC 12.15    11-18    148<H>  |  110<H>  |  33<H>  ----------------------------<  84  3.1<L>   |  33<H>  |  1.55<H>    Ca    8.0<L>      18 Nov 2020 06:22  Phos  3.0     11-18  Mg     1.8     11-18    TPro  6.0  /  Alb  2.3<L>  /  TBili  2.7<H>  /  DBili  x   /  AST  89<H>  /  ALT  49  /  AlkPhos  130<H>  11-18                    BLOOD SMEAR INTERPRETATION:       RADIOLOGY & ADDITIONAL STUDIES:

## 2020-11-19 NOTE — PROGRESS NOTE ADULT - SUBJECTIVE AND OBJECTIVE BOX
Patient is seen and examined at the bed side, is afebrile.  She is OFF oxygen today, saturating well at room Air. The Sacral ulcer has that yellow fibrinous material.       REVIEW OF SYSTEMS: All other review systems are negative        ALLERGIES: No Known Allergies        Vital Signs Last 24 Hrs  T(C): 37.1 (19 Nov 2020 13:26), Max: 38.6 (18 Nov 2020 20:18)  T(F): 98.8 (19 Nov 2020 13:26), Max: 101.5 (18 Nov 2020 20:18)  HR: 89 (19 Nov 2020 13:26) (89 - 95)  BP: 120/69 (19 Nov 2020 13:26) (111/65 - 120/69)  BP(mean): --  RR: 16 (19 Nov 2020 13:26) (16 - 16)  SpO2: 96% (19 Nov 2020 13:26) (96% - 100%)      PHYSICAL EXAM:  GENERAL: Not in distress, on oxygen via NC  CHEST/LUNG: Not using accessory muscles   HEART: s1 and s2 present  ABDOMEN:  Nontender and  Nondistended  EXTREMITIES: B/L UE edematous  CNS: Awake and Alert         LABS:                        8.0    11.12 )-----------( 254      ( 19 Nov 2020 06:44 )             25.5                           7.8    11.15 )-----------( 221      ( 18 Nov 2020 06:22 )             25.2                                     9.4    13.14 )-----------( 149      ( 14 Nov 2020 07:58 )             28.7       11-19    148<H>  |  110<H>  |  34<H>  ----------------------------<  79  3.1<L>   |  27  |  1.50<H>    Ca    8.5      19 Nov 2020 06:44  Phos  2.6     11-19  Mg     1.8     11-19    TPro  6.5  /  Alb  2.5<L>  /  TBili  2.8<H>  /  DBili  x   /  AST  89<H>  /  ALT  50  /  AlkPhos  145<H>  11-19 11-18    148<H>  |  110<H>  |  33<H>  ----------------------------<  84  3.1<L>   |  33<H>  |  1.55<H>    Ca    8.0<L>      18 Nov 2020 06:22  Phos  3.0     11-18  Mg     1.8     11-18    TPro  6.0  /  Alb  2.3<L>  /  TBili  2.7<H>  /  DBili  x   /  AST  89<H>  /  ALT  49  /  AlkPhos  130<H>  11-18 11-17    147<H>  |  108  |  35<H>  ----------------------------<  74  3.1<L>   |  32<H>  |  1.58<H>    Ca    7.6<L>      17 Nov 2020 10:30  Phos  3.3     11-17  Mg     1.7     11-17      11-06    138  |  104  |  37<H>  ----------------------------<  81  3.9   |  25  |  2.37<H>    Ca    8.1<L>      06 Nov 2020 06:20  Phos  3.4     11-06  Mg     2.0     11-06    TPro  5.1<L>  /  Alb  2.8<L>  /  TBili  9.2<H>  /  DBili  x   /  AST  233<H>  /  ALT  99<H>  /  AlkPhos  96  11-06      Vancomycin Level, Trough (11.07.20 @ 21:49)   Vancomycin Level, Trough: 16.1:     Vancomycin Level, Trough (11.07.20 @ 07:08)   Vancomycin Level, Trough: 19.5    vanVancomycin Level, Trough (11.06.20 @ 06:20)   Vancomycin Level, Trough: 19.9:         MEDICATIONS  (STANDING):    ascorbic acid 500 milliGRAM(s) Oral daily  DAPTOmycin IVPB      DAPTOmycin IVPB 350 milliGRAM(s) IV Intermittent every 24 hours  dextrose 5% 1000 milliLiter(s) (50 mL/Hr) IV Continuous <Continuous>  enoxaparin Injectable 50 milliGRAM(s) SubCutaneous daily  fluconAZOLE IVPB 200 milliGRAM(s) IV Intermittent every 24 hours  fluconAZOLE IVPB      furosemide    Tablet 40 milliGRAM(s) Oral daily  lactulose Syrup 10 Gram(s) Oral daily  multivitamin 1 Tablet(s) Oral daily  pantoprazole   Suspension 40 milliGRAM(s) Enteral Tube daily  rifAXIMin 550 milliGRAM(s) Oral two times a day  zinc sulfate 220 milliGRAM(s) Oral daily      RADIOLOGY & ADDITIONAL TESTS:    11/4/20 : Xray Chest 1 View- PORTABLE-Routine (Xray Chest 1 View- PORTABLE-Routine in AM.) (11.04.20 @ 10:59) Reexpansion of the left lung with residual left pleural effusion and/or infiltrate.  Right pulmonary edema unchanged.  Tubes and catheters in satisfactory position.      10/25/20: Xray Chest 1 View- PORTABLE-Routine (Xray Chest 1 View- PORTABLE-Routine in AM.) (10.25.20 @ 09:21) Frontal expiratory view of the chest shows the heart to be similar in size. Endotracheal tube, right jugular line and feeding tube remain present.    The lungs show similar left upper lobe infiltrate with progression of right perihilar infiltrate. Pleural effusions are similar. There is no evidence of pneumothorax.      10/23/20 : Xray Chest 1 View-PORTABLE IMMEDIATE (Xray Chest 1 View-PORTABLE IMMEDIATE .) (10.23.20 @ 19:09) Feeding tube tip near GE junction as noted. Questionable right upper lobe infiltrate. Follow-up study is recommended as clinically warranted.      10/21/20 : CT Abdomen and Pelvis w/ Oral Cont and w/ IV Cont (10.21.20 @ 15:59) Mural thickening of the left colon and rectum. Liquid stool in the colon. Apparent segmental mural thickening of the mid to distal small bowel and the proximal duodenum. Findings may represent nonspecific enterocolitis, noninfectious inflammatory bowel disease, or ischemic bowel. Clinical correlation is recommended. The celiac axis artery, SMA, and KINA are patent without stenosis.    Dilatation of the mid small bowel is again noted. Oral contrast has reached the terminal ileum. Findings may represent small bowel obstruction, or ileus related to nonspecific enterocolitis.   Cholelithiasis.    Moderate to large ascites in the abdomen, increased since the previous examination. 5 mm nonspecific noncalcified left upper lobe lung nodule; if the patient's is in the high risk category (i.e. smoker), follow-up chest CT may be pursued in 12 months to ensure stability.    Combination of atelectasis and consolidation in the left lower lobe.    Possible 1.0 cm hypodense lesion in the left lobe of the thyroid. Thyroid ultrasound may be pursued for further evaluation.   Mild bilateral pleural effusions.    Aging determinate compression fracture at T5 vertebra.        10/13/20 : CT Abdomen and Pelvis w/ IV Cont (10.13.20 @ 18:50) Diffuse dilatation of small bowel loops without a clear transition is slightly increased, likely an ileus.  Decreased moderate ascites.    1.5 cm pancreatic versus peripancreatic soft tissue nodule    10/13/20 : Xray Chest 1 View- PORTABLE-Urgent (Xray Chest 1 View- PORTABLE-Urgent .) (10.13.20 @ 16:34) Clear lungs.          MICROBIOLOGY DATA:    Culture - Surgical Swab (11.12.20 @ 05:01)   Specimen Source: .Surgical Swab Sacral decub   Culture Results:   Few Enterococcus faecium Susceptibility to follow.   Rare Candida albicans "Susceptibilities not performed"     Culture - Sputum . (10.26.20 @ 00:26)   - Ampicillin/Sulbactam: R <=8/4   - Cefazolin: R >16   - Ceftazidime: I 16   - Clindamycin: R >4   - Erythromycin: R >4   - Gentamicin: S <=1 Should not be used as monotherapy   - Levofloxacin: S <=0.5   - Linezolid: S 2   - Oxacillin: R >2   - Penicillin: R >8   - RIF- Rifampin: S <=1 Should not be used as monotherapy   - Tetra/Doxy: S <=1   - Trimethoprim/Sulfamethoxazole: S <=0.5/9.5   - Trimethoprim/Sulfamethoxazole: S <=0.5/9.5   - Vancomycin: S 1       MRSA/MSSA PCR (10.21.20 @ 09:41)   MRSA PCR Result.: Detected:       Culture - Blood (10.20.20 @ 22:09)   Specimen Source: .Blood Blood   Culture Results:  No growth to date.     Culture - Blood (10.20.20 @ 22:09)   Specimen Source: .Blood Blood   Culture Results:   No growth to date.     Culture - Blood in AM (10.16.20 @ 10:13)   Specimen Source: .Blood Blood-Peripheral   Culture Results: No growth to date.     Culture - Blood in AM (10.16.20 @ 10:13)   Specimen Source: .Blood Blood-Peripheral   Culture Results: No growth to date.     Culture - Blood (10.13.20 @ 22:23)   Growth in anaerobic bottle: Gram Negative Rods   Specimen Source: .Blood Blood-Peripheral   Organism: Blood Culture PCR   Culture Results: Growth in anaerobic bottle: Bacteroides fragilis   "Susceptibilities not performed"     Culture - Blood (10.13.20 @ 22:23)   Specimen Source: .Blood Blood-Peripheral   Culture Results:   No growth to date.                Patient is seen and examined at the bed side, is afebrile.  She remains OFF oxygen , saturating well at room Air.       REVIEW OF SYSTEMS: All other review systems are negative        ALLERGIES: No Known Allergies        Vital Signs Last 24 Hrs  T(C): 37.1 (19 Nov 2020 13:26), Max: 38.6 (18 Nov 2020 20:18)  T(F): 98.8 (19 Nov 2020 13:26), Max: 101.5 (18 Nov 2020 20:18)  HR: 89 (19 Nov 2020 13:26) (89 - 95)  BP: 120/69 (19 Nov 2020 13:26) (111/65 - 120/69)  BP(mean): --  RR: 16 (19 Nov 2020 13:26) (16 - 16)  SpO2: 96% (19 Nov 2020 13:26) (96% - 100%)      PHYSICAL EXAM:  GENERAL: Not in distress, on oxygen via NC  CHEST/LUNG: Not using accessory muscles   HEART: s1 and s2 present  ABDOMEN:  Nontender and  Nondistended  EXTREMITIES: B/L UE edematous  CNS: Awake and Alert         LABS:                        8.0    11.12 )-----------( 254      ( 19 Nov 2020 06:44 )             25.5                           7.8    11.15 )-----------( 221      ( 18 Nov 2020 06:22 )             25.2                                     9.4    13.14 )-----------( 149      ( 14 Nov 2020 07:58 )             28.7       11-19    148<H>  |  110<H>  |  34<H>  ----------------------------<  79  3.1<L>   |  27  |  1.50<H>    Ca    8.5      19 Nov 2020 06:44  Phos  2.6     11-19  Mg     1.8     11-19    TPro  6.5  /  Alb  2.5<L>  /  TBili  2.8<H>  /  DBili  x   /  AST  89<H>  /  ALT  50  /  AlkPhos  145<H>  11-19 11-18    148<H>  |  110<H>  |  33<H>  ----------------------------<  84  3.1<L>   |  33<H>  |  1.55<H>    Ca    8.0<L>      18 Nov 2020 06:22  Phos  3.0     11-18  Mg     1.8     11-18    TPro  6.0  /  Alb  2.3<L>  /  TBili  2.7<H>  /  DBili  x   /  AST  89<H>  /  ALT  49  /  AlkPhos  130<H>  11-18 11-06    138  |  104  |  37<H>  ----------------------------<  81  3.9   |  25  |  2.37<H>    Ca    8.1<L>      06 Nov 2020 06:20  Phos  3.4     11-06  Mg     2.0     11-06    TPro  5.1<L>  /  Alb  2.8<L>  /  TBili  9.2<H>  /  DBili  x   /  AST  233<H>  /  ALT  99<H>  /  AlkPhos  96  11-06      Vancomycin Level, Trough (11.07.20 @ 21:49)   Vancomycin Level, Trough: 16.1:     Vancomycin Level, Trough (11.07.20 @ 07:08)   Vancomycin Level, Trough: 19.5    vanVancomycin Level, Trough (11.06.20 @ 06:20)   Vancomycin Level, Trough: 19.9:         MEDICATIONS  (STANDING):    ascorbic acid 500 milliGRAM(s) Oral daily  DAPTOmycin IVPB      DAPTOmycin IVPB 350 milliGRAM(s) IV Intermittent every 24 hours  dextrose 5% 1000 milliLiter(s) (50 mL/Hr) IV Continuous <Continuous>  enoxaparin Injectable 50 milliGRAM(s) SubCutaneous daily  fluconAZOLE IVPB 200 milliGRAM(s) IV Intermittent every 24 hours  fluconAZOLE IVPB      furosemide    Tablet 40 milliGRAM(s) Oral daily  lactulose Syrup 10 Gram(s) Oral daily  multivitamin 1 Tablet(s) Oral daily  pantoprazole   Suspension 40 milliGRAM(s) Enteral Tube daily  rifAXIMin 550 milliGRAM(s) Oral two times a day  zinc sulfate 220 milliGRAM(s) Oral daily      RADIOLOGY & ADDITIONAL TESTS:    11/4/20 : Xray Chest 1 View- PORTABLE-Routine (Xray Chest 1 View- PORTABLE-Routine in AM.) (11.04.20 @ 10:59) Reexpansion of the left lung with residual left pleural effusion and/or infiltrate.  Right pulmonary edema unchanged.  Tubes and catheters in satisfactory position.      10/25/20: Xray Chest 1 View- PORTABLE-Routine (Xray Chest 1 View- PORTABLE-Routine in AM.) (10.25.20 @ 09:21) Frontal expiratory view of the chest shows the heart to be similar in size. Endotracheal tube, right jugular line and feeding tube remain present.    The lungs show similar left upper lobe infiltrate with progression of right perihilar infiltrate. Pleural effusions are similar. There is no evidence of pneumothorax.      10/23/20 : Xray Chest 1 View-PORTABLE IMMEDIATE (Xray Chest 1 View-PORTABLE IMMEDIATE .) (10.23.20 @ 19:09) Feeding tube tip near GE junction as noted. Questionable right upper lobe infiltrate. Follow-up study is recommended as clinically warranted.      10/21/20 : CT Abdomen and Pelvis w/ Oral Cont and w/ IV Cont (10.21.20 @ 15:59) Mural thickening of the left colon and rectum. Liquid stool in the colon. Apparent segmental mural thickening of the mid to distal small bowel and the proximal duodenum. Findings may represent nonspecific enterocolitis, noninfectious inflammatory bowel disease, or ischemic bowel. Clinical correlation is recommended. The celiac axis artery, SMA, and KINA are patent without stenosis.    Dilatation of the mid small bowel is again noted. Oral contrast has reached the terminal ileum. Findings may represent small bowel obstruction, or ileus related to nonspecific enterocolitis.   Cholelithiasis.    Moderate to large ascites in the abdomen, increased since the previous examination. 5 mm nonspecific noncalcified left upper lobe lung nodule; if the patient's is in the high risk category (i.e. smoker), follow-up chest CT may be pursued in 12 months to ensure stability.    Combination of atelectasis and consolidation in the left lower lobe.    Possible 1.0 cm hypodense lesion in the left lobe of the thyroid. Thyroid ultrasound may be pursued for further evaluation.   Mild bilateral pleural effusions.    Aging determinate compression fracture at T5 vertebra.        10/13/20 : CT Abdomen and Pelvis w/ IV Cont (10.13.20 @ 18:50) Diffuse dilatation of small bowel loops without a clear transition is slightly increased, likely an ileus.  Decreased moderate ascites.    1.5 cm pancreatic versus peripancreatic soft tissue nodule    10/13/20 : Xray Chest 1 View- PORTABLE-Urgent (Xray Chest 1 View- PORTABLE-Urgent .) (10.13.20 @ 16:34) Clear lungs.          MICROBIOLOGY DATA:    Culture - Surgical Swab (11.12.20 @ 05:01)   Specimen Source: .Surgical Swab Sacral decub   Culture Results:  Few Enterococcus faecium Susceptibility to follow.   Rare Candida albicans "Susceptibilities not performed"     Culture - Sputum . (10.26.20 @ 00:26)   - Ampicillin/Sulbactam: R <=8/4   - Cefazolin: R >16   - Ceftazidime: I 16   - Clindamycin: R >4   - Erythromycin: R >4   - Gentamicin: S <=1 Should not be used as monotherapy   - Levofloxacin: S <=0.5   - Linezolid: S 2   - Oxacillin: R >2   - Penicillin: R >8   - RIF- Rifampin: S <=1 Should not be used as monotherapy   - Tetra/Doxy: S <=1   - Trimethoprim/Sulfamethoxazole: S <=0.5/9.5   - Trimethoprim/Sulfamethoxazole: S <=0.5/9.5   - Vancomycin: S 1       MRSA/MSSA PCR (10.21.20 @ 09:41)   MRSA PCR Result.: Detected:       Culture - Blood (10.20.20 @ 22:09)   Specimen Source: .Blood Blood   Culture Results:  No growth to date.     Culture - Blood (10.20.20 @ 22:09)   Specimen Source: .Blood Blood   Culture Results:   No growth to date.     Culture - Blood in AM (10.16.20 @ 10:13)   Specimen Source: .Blood Blood-Peripheral   Culture Results: No growth to date.     Culture - Blood in AM (10.16.20 @ 10:13)   Specimen Source: .Blood Blood-Peripheral   Culture Results: No growth to date.     Culture - Blood (10.13.20 @ 22:23)   Growth in anaerobic bottle: Gram Negative Rods   Specimen Source: .Blood Blood-Peripheral   Organism: Blood Culture PCR   Culture Results: Growth in anaerobic bottle: Bacteroides fragilis   "Susceptibilities not performed"     Culture - Blood (10.13.20 @ 22:23)   Specimen Source: .Blood Blood-Peripheral   Culture Results:   No growth to date.

## 2020-11-19 NOTE — PROGRESS NOTE ADULT - PROBLEM SELECTOR PLAN 9
DVT and GI prophylaxis  Continue rifaximin  Dapto for total of 10 days  F/u repeat BC  Discharge planning likely to [facility] when medically optimized

## 2020-11-19 NOTE — PROGRESS NOTE ADULT - ASSESSMENT
65y Female from home, lives with daughter, ambulates with walker with PMH of recurrent SBO's, s/p exp lap, SB resection in 2015, ex lap, ALBINA in 2018, DVT, PE, on Xarelto, IVC filter, chronic leg swelling, and anasarca, came in to the ED due to hypotension during office visit. Patient was found to be bacteremic with bacteroids fragilis. Admitted in ICU due to hypotension and hypothermia. patient completed course of antibiotics . BCx X2 negative. Ammonia level elevated and patient refused NGT placement, mental status deteriorated and patient desaturated to high 70%, patient got intubated on 10/24 . Sputum was positive for MRSA and Stenotrophomonas. patient started on vancomycin and Levaquin. patient was given lactulose for high ammonia level. kidney function and liver function started to deteriorate and patient was leaning toward multi organ failure . high dose of Lasix started and patient was aggressively diuresed. Kidney function improved . Ammonia level dropped to less than 10.  Mental status improved and patient extubated on 11/3/2020. Patient was seen by wound specialist and decubitus ulcer debrided. patient had a drop in h/h s/p debridement . received 1 unit PRBC .   11/12  Transferred from ICU to SCU  `11/17  Midodrine d/c secondary to daptomycin being started.  11/18 Febrile, Tmax 101.5. Repeat BC sent.   11/19 Discontinue velazquez

## 2020-11-19 NOTE — PROGRESS NOTE ADULT - ASSESSMENT
Patient is a 65y old  Female from home, lives with daughter, ambulates with walker with PMH of recurrent SBO's, s/p exp lap, SB resection in 2015, ex lap, ALBINA in 2018, DVT, PE, on Xarelto, IVC filter, chronic leg swelling, and anasarca, Now send in to the ER by her PCP, Dr. Ross, for evaluation of  generalized weakness and hypotension BP of 80/40 during office visit. On admission, she found to have no fever and BP was in lower normal range and no Leukocytosis. The CXR is clear and CT abd/pelvis consistent with Ileus. The ID consult requested to assist with evaluation of infectious etiology of episode of hypotension. Found to have Bacteroides bacteremia and now developed septic shock on Cefepime and Flagyl. Hence transferred to ICU. 10/20/20.    # Hypotension  at office- BP of 80/40 - resolved   #  Bacteroides fragilis Bacteremia - 10/13/20 - ? source most likely GI- Repeat Blood Cxs have NGTD 10/16/20  # Ileus  - h/o recurrent SBO and s/p EXp. LAp  # COVID 19 negative   # Septic shock ( Hypothermia + hypotensive)- transferred to ICU since requiring pressor- source GI, The CT abd/pelvis shows nonspecific enterocolitis, noninfectious inflammatory bowel disease, or ischemic bowel, along with ileus.   # Pneumonia- HCAP vs Aspiration - on CXR 10/24 and CT chest 10/21- sputum Cx grew Methicillin resistant Staphylococcus aureus  and Stenotrophomonas maltophilia.  # S/p extubated 11/9/20  # Infected sacral ulcer- s/p debridement and culture grew Enterococcus and Candida, sensitivity is pending      would recommend:    1. Please change velazquez catheter, may benefit to continue in the setting of sacral ulcer   2. Continue  Daptomycin to cover VRE  and  Monitor CPK level  3. Continue Fluconazole to cover Candida in wound culture  4. Monitor kidney function, is improving    5. Aspiration precaution  6. Continue Sacral Wound care  and Frequent Repositioning     d/w Covering NP and Nursing staff    Attending Attestation:    Spent more than 45 minutes on total encounter, more than 50 % of the visit was spent counseling and/or coordinating care by the Attending physician. Patient is a 65y old  Female from home, lives with daughter, ambulates with walker with PMH of recurrent SBO's, s/p exp lap, SB resection in 2015, ex lap, ALBINA in 2018, DVT, PE, on Xarelto, IVC filter, chronic leg swelling, and anasarca, Now send in to the ER by her PCP, Dr. Ross, for evaluation of  generalized weakness and hypotension BP of 80/40 during office visit. On admission, she found to have no fever and BP was in lower normal range and no Leukocytosis. The CXR is clear and CT abd/pelvis consistent with Ileus. The ID consult requested to assist with evaluation of infectious etiology of episode of hypotension. Found to have Bacteroides bacteremia and now developed septic shock on Cefepime and Flagyl. Hence transferred to ICU. 10/20/20.    # Hypotension  at office- BP of 80/40 - resolved   #  Bacteroides fragilis Bacteremia - 10/13/20 - ? source most likely GI- Repeat Blood Cxs have NGTD 10/16/20  # Ileus  - h/o recurrent SBO and s/p EXp. LAp  # COVID 19 negative   # Septic shock ( Hypothermia + hypotensive)- transferred to ICU since requiring pressor- source GI, The CT abd/pelvis shows nonspecific enterocolitis, noninfectious inflammatory bowel disease, or ischemic bowel, along with ileus.   # Pneumonia- HCAP vs Aspiration - on CXR 10/24 and CT chest 10/21- sputum Cx grew Methicillin resistant Staphylococcus aureus  and Stenotrophomonas maltophilia.  # S/p extubated 11/9/20  # Infected sacral ulcer- s/p debridement and culture grew Enterococcus and Candida, sensitivity is pending      would recommend:    1. OOB to chair/PT  2. Please obtain Imaging of pelvis to rule out osteomyelitis   3. Continue  Daptomycin to cover VRE  and  Fluconazole to cover Candida in wound culture  4. Monitor kidney function, is improving    5. Aspiration precaution  6. Continue Sacral Wound care  and Frequent Repositioning   7. Monitor CPK level while on Daptomycin    d/w  Nursing staff    Attending Attestation:    Spent more than 45 minutes on total encounter, more than 50 % of the visit was spent counseling and/or coordinating care by the Attending physician.

## 2020-11-19 NOTE — PROGRESS NOTE ADULT - PROBLEM SELECTOR PLAN 5
S/P sacral wound debridement  Continue fluconazole as per ID.   c/w Daptomycin , day 3/10. Monitor CK while on Dapto   Continue wound care as ordered.  Turn every 2 hours.  OOB daily.

## 2020-11-19 NOTE — PROGRESS NOTE ADULT - SUBJECTIVE AND OBJECTIVE BOX
Patient was seen and examined  Patient is a 65y old  Female who presents with a chief complaint of Hypotension (19 Nov 2020 17:11)      INTERVAL HPI/OVERNIGHT EVENTS:  T(C): 37.1 (11-19-20 @ 13:26), Max: 38.6 (11-18-20 @ 20:18)  HR: 89 (11-19-20 @ 13:26) (89 - 95)  BP: 120/69 (11-19-20 @ 13:26) (111/65 - 120/69)  RR: 16 (11-19-20 @ 13:26) (16 - 16)  SpO2: 96% (11-19-20 @ 13:26) (96% - 100%)  Wt(kg): --  I&O's Summary    18 Nov 2020 07:01  -  19 Nov 2020 07:00  --------------------------------------------------------  IN: 0 mL / OUT: 1600 mL / NET: -1600 mL    19 Nov 2020 07:01  -  19 Nov 2020 19:05  --------------------------------------------------------  IN: 0 mL / OUT: 900 mL / NET: -900 mL        LABS:                        8.0    11.12 )-----------( 254      ( 19 Nov 2020 06:44 )             25.5     11-19    148<H>  |  110<H>  |  34<H>  ----------------------------<  79  3.1<L>   |  27  |  1.50<H>    Ca    8.5      19 Nov 2020 06:44  Phos  2.6     11-19  Mg     1.8     11-19    TPro  6.5  /  Alb  2.5<L>  /  TBili  2.8<H>  /  DBili  x   /  AST  89<H>  /  ALT  50  /  AlkPhos  145<H>  11-19        CAPILLARY BLOOD GLUCOSE                  MEDICATIONS  (STANDING):  ascorbic acid 500 milliGRAM(s) Oral daily  DAPTOmycin IVPB      DAPTOmycin IVPB 350 milliGRAM(s) IV Intermittent every 24 hours  dextrose 5% 1000 milliLiter(s) (50 mL/Hr) IV Continuous <Continuous>  enoxaparin Injectable 50 milliGRAM(s) SubCutaneous daily  fluconAZOLE IVPB 200 milliGRAM(s) IV Intermittent every 24 hours  fluconAZOLE IVPB      furosemide    Tablet 40 milliGRAM(s) Oral daily  lactulose Syrup 10 Gram(s) Oral daily  multivitamin 1 Tablet(s) Oral daily  pantoprazole   Suspension 40 milliGRAM(s) Enteral Tube daily  rifAXIMin 550 milliGRAM(s) Oral two times a day  zinc sulfate 220 milliGRAM(s) Oral daily    MEDICATIONS  (PRN):      RADIOLOGY & ADDITIONAL TESTS:    Imaging Personally Reviewed:  [ ] YES  [ ] NO    REVIEW OF SYSTEMS:  CONSTITUTIONAL: No fever, weight loss, or fatigue  EYES: No eye pain, visual disturbances, or discharge  ENMT:  No difficulty hearing, tinnitus, vertigo; No sinus or throat pain  NECK: No pain or stiffness  BREASTS: No pain, masses, or nipple discharge  RESPIRATORY: No cough, wheezing, chills or hemoptysis; No shortness of breath  CARDIOVASCULAR: No chest pain, palpitations, dizziness, or leg swelling  GASTROINTESTINAL: No abdominal or epigastric pain. No nausea, vomiting, or hematemesis; No diarrhea or constipation. No melena or hematochezia.  GENITOURINARY: No dysuria, frequency, hematuria, or incontinence  NEUROLOGICAL: No headaches, memory loss, loss of strength, numbness, or tremors  SKIN: No itching, burning, rashes, or lesions   LYMPH NODES: No enlarged glands  ENDOCRINE: No heat or cold intolerance; No hair loss  MUSCULOSKELETAL: No joint pain or swelling; No muscle, back, or extremity pain  PSYCHIATRIC: No depression, anxiety, mood swings, or difficulty sleeping  HEME/LYMPH: No easy bruising, or bleeding gums  ALLERY AND IMMUNOLOGIC: No hives or eczema      Consultant(s) Notes Reviewed:  [ x ] YES  [ ] NO    PHYSICAL EXAM:  GENERAL: NAD, well-groomed, well-developed  HEAD:  Atraumatic, Normocephalic  EYES: EOMI, PERRLA, conjunctiva and sclera clear  ENMT: No tonsillar erythema, exudates, or enlargement; Moist mucous membranes, Good dentition, No lesions  NECK: Supple, No JVD, Normal thyroid  NERVOUS SYSTEM:  Alert & Oriented X3, Good concentration; Motor Strength 5/5 B/L upper and lower extremities; DTRs 2+ intact and symmetric  CHEST/LUNG: Clear to percussion bilaterally; No rales, rhonchi, wheezing, or rubs  HEART: Regular rate and rhythm; No murmurs, rubs, or gallops  ABDOMEN: Soft, Nontender, Nondistended; Bowel sounds present  EXTREMITIES:  2+ Peripheral Pulses, No clubbing, cyanosis, or edema  LYMPH: No lymphadenopathy noted  SKIN: No rashes or lesions    Care Discussed with Consultants/Other Providers [ x] YES  [ ] NO

## 2020-11-19 NOTE — PROGRESS NOTE ADULT - ASSESSMENT
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  BC grew bacteroides.    Problem/Recommendation - 1:  fever again  will do fever w/u  blood culture, CXR, stool c.diff  Problem/Recommendation - 2:·  Problem: Anemia.  Recommendation: ferritin, b12 folate normalno hemolysis  most likley due to malnutrition from short bowel syndrome.she also had GIB multiple times before. Problem/Recommendation - 3:·  Problem: Acute deep vein thrombosis (DVT) of other vein of upper extremity.  Recommendation: she has been on xarelto for 2 years.  DVT at left arm and left leg before.  in all CT scan i did not see report of IVC or hepatic vein thrombosisoff xarelto and lovenox due to elevated PT/PTT. elevated PT/PTT due to malnutrition and antibiotics causing VITK def  now it is mostly recovered  it begins to rise again, correctable by mixing study  probably due to liver failure this time  restart lovenox at half dose due to low GFR

## 2020-11-19 NOTE — PROGRESS NOTE ADULT - PROBLEM SELECTOR PLAN 1
Secondary to bacteremia . Bacteroides fragilis per BC on 10/13  Sputum positive for MRSA and Stenotrophomas  Aspiration pneumonia vs HCAP   Repeat BC 10/16 NGTD    Infected sacral ulcer- s/p debridement and culture grew Enterococcus and Candida, sensitivity is pending  Febrile overnight, leukocytosis stable  c/w Dapto and Fluconazole  Remove velazquez . Apply permafit  f/u repeat BC 11/19  Monitor CK weekly while on Dapto  ID Dr. Patricio following         `

## 2020-11-19 NOTE — PROGRESS NOTE ADULT - SUBJECTIVE AND OBJECTIVE BOX
MARIBETH PADILLA    SCU progress note    INTERVAL HPI/OVERNIGHT EVENTS: ***    DNR [ x]   DNI  [  ] trial of intubation , trial feeding tube/ trial of IV fluids/abx    Covid - 19 PCR: not detected 11/17    The 4Ms    What Matters Most: see [facility]  Age appropriate Medications/Screen for High Risk Medication: Yes  Mentation: see CAM below  Mobility: defer to physical exam    The Confusion Assessment Method (CAM) Diagnostic Algorithm     1: Acute Onset or Fluctuating Course  - Is there evidence of an acute change in mental status from the patient’s baseline? Did the (abnormal) behavior  fluctuate during the day, that is, tend to come and go, or increase and decrease in severity?  [ ] YES [x ] NO     2: Inattention  - Did the patient have difficulty focusing attention, being easily distractible, or having difficulty keeping track of what was being said?  [ ] YES [ x] NO     3: Disorganized thinking  -Was the patient’s thinking disorganized or incoherent, such as rambling or irrelevant conversation, unclear or illogical flow of ideas, or unpredictable switching from subject to subject?  [ ] YES [x] NO    4: Altered Level of consciousness?  [ ] YES [ x] NO    The diagnosis of delirium by CAM requires the presence of features 1 and 2 and either 3 or 4.    PRESSORS: [ ] YES [ x] NO  DAPTOmycin IVPB      DAPTOmycin IVPB 350 milliGRAM(s) IV Intermittent every 24 hours  fluconAZOLE IVPB 200 milliGRAM(s) IV Intermittent every 24 hours  fluconAZOLE IVPB      rifAXIMin 550 milliGRAM(s) Oral two times a day    Cardiovascular:  Heart Failure  Acute   Acute on Chronic  Chronic       furosemide    Tablet 40 milliGRAM(s) Oral daily    Pulmonary:    Hematalogic:  enoxaparin Injectable 50 milliGRAM(s) SubCutaneous daily    Other:  ascorbic acid 500 milliGRAM(s) Oral daily  dextrose 5% 1000 milliLiter(s) IV Continuous <Continuous>  lactulose Syrup 10 Gram(s) Oral daily  multivitamin 1 Tablet(s) Oral daily  pantoprazole   Suspension 40 milliGRAM(s) Enteral Tube daily  zinc sulfate 220 milliGRAM(s) Oral daily    ascorbic acid 500 milliGRAM(s) Oral daily  DAPTOmycin IVPB      DAPTOmycin IVPB 350 milliGRAM(s) IV Intermittent every 24 hours  dextrose 5% 1000 milliLiter(s) IV Continuous <Continuous>  enoxaparin Injectable 50 milliGRAM(s) SubCutaneous daily  fluconAZOLE IVPB 200 milliGRAM(s) IV Intermittent every 24 hours  fluconAZOLE IVPB      furosemide    Tablet 40 milliGRAM(s) Oral daily  lactulose Syrup 10 Gram(s) Oral daily  multivitamin 1 Tablet(s) Oral daily  pantoprazole   Suspension 40 milliGRAM(s) Enteral Tube daily  rifAXIMin 550 milliGRAM(s) Oral two times a day  zinc sulfate 220 milliGRAM(s) Oral daily    Drug Dosing Weight  Height (cm): 167.6 (21 Oct 2020 02:26)  Weight (kg): 56.2 (21 Oct 2020 02:26)  BMI (kg/m2): 20 (21 Oct 2020 02:26)  BSA (m2): 1.63 (21 Oct 2020 02:26)    CENTRAL LINE: [ ] YES [x] NO  LOCATION:   DATE INSERTED:  REMOVE: [ ] YES [ ] NO  EXPLAIN:    TREJO: [x ] YES [ ] NO    DATE INSERTED:  REMOVE:  [x ] YES [ ] NO  EXPLAIN:    PAST MEDICAL & SURGICAL HISTORY:  Anasarca    Pulmonary embolism    DVT (deep venous thrombosis)    SBO (small bowel obstruction)    H/O exploratory laparotomy                11-18 @ 07:01  -  11-19 @ 07:00  --------------------------------------------------------  IN: 0 mL / OUT: 1600 mL / NET: -1600 mL            PHYSICAL EXAM:    GENERAL: generalized weakness , cachectic, NAD,   HEAD:  Atraumatic, Normocephalic  EYES: EOMI, PERRLA, conjunctiva and sclera clear  ENMT: No tonsillar erythema, exudates, or enlargement; Moist mucous membranes, Good dentition, No lesions  NECK: Supple, No JVD, Normal thyroid  NERVOUS SYSTEM:  Alert & Oriented X1, follows commands,  Motor Strength 4- 5/5 B/L upper , 3-4/5 BLE   CHEST/LUNG: Clear to percussion bilaterally; No rales, rhonchi, wheezing, or rubs  HEART: Regular rate and rhythm; No murmurs, rubs, or gallops  ABDOMEN: Soft, Nontender, Nondistended; Bowel sounds present  EXTREMITIES:  2+ Peripheral Pulses, No clubbing, cyanosis, or edema  LYMPH: No lymphadenopathy noted  SKIN: warm dry      LABS:  CBC Full  -  ( 19 Nov 2020 06:44 )  WBC Count : 11.12 K/uL  RBC Count : 2.75 M/uL  Hemoglobin : 8.0 g/dL  Hematocrit : 25.5 %  Platelet Count - Automated : 254 K/uL  Mean Cell Volume : 92.7 fl  Mean Cell Hemoglobin : 29.1 pg  Mean Cell Hemoglobin Concentration : 31.4 gm/dL  Auto Neutrophil # : 8.61 K/uL  Auto Lymphocyte # : 1.30 K/uL  Auto Monocyte # : 0.97 K/uL  Auto Eosinophil # : 0.11 K/uL  Auto Basophil # : 0.10 K/uL  Auto Neutrophil % : 77.4 %  Auto Lymphocyte % : 11.7 %  Auto Monocyte % : 8.7 %  Auto Eosinophil % : 1.0 %  Auto Basophil % : 0.9 %    11-19    148<H>  |  110<H>  |  34<H>  ----------------------------<  79  3.1<L>   |  27  |  1.50<H>    Ca    8.5      19 Nov 2020 06:44  Phos  2.6     11-19  Mg     1.8     11-19    TPro  6.5  /  Alb  2.5<L>  /  TBili  2.8<H>  /  DBili  x   /  AST  89<H>  /  ALT  50  /  AlkPhos  145<H>  11-19              [  ]  DVT Prophylaxis  [  ]  Nutrition, Brand, Rate         Goal Rate        Abnormal Nutritional Status -  Malnutrition   Cachexia      Morbid Obesity BMI >/=40    RADIOLOGY & ADDITIONAL STUDIES:  ***    Goals of Care Discussion with Family/Proxy/Other   - see note from/family meeting : 11/18    scu

## 2020-11-19 NOTE — CHART NOTE - NSCHARTNOTEFT_GEN_A_CORE
EVENT:  65y Female from home, lives with daughter, ambulates with walker with PMH of recurrent SBO's, s/p exp lap, SB resection in 2015, ex lap, ALBINA in 2018, DVT, PE, on Xarelto, IVC filter, chronic leg swelling, and anasarca, came in to the ED due to hypotension during office visit. Patient was found to be bacteremic with bacteroids fragilis. Admitted in ICU due to hypotension and hypothermia. patient completed course of antibiotics. BCx X2 negative. Ammonia level elevated and patient refused NGT placement, mental status deteriorated and patient desaturated to high 70%, patient got intubated on 10/24 . Sputum was positive for MRSA and Stenotrophomonas. patient started on vancomycin and Levaquin. patient was given lactulose for high ammonia level. kidney function and liver function started to deteriorate and patient was leaning toward multi organ failure . high dose of Lasix started and patient was aggressively diuresed. Kidney function improved. Ammonia level dropped to less than 10.  Mental status improved and patient extubated on 11/3/2020. Patient was seen by wound specialist and decubitus ulcer debrided. Patient had a drop in h/h s/p debridement, received 1 unit PRBC .   11/12  Transferred from ICU to SCU  `11/17  Midodrine d/c secondary to daptomycin being started    I was text paged by nurse, patient had a temp 101.5, then 100.4    SUBJECTIVE:  In NAD    OBJECTIVE:  Vital Signs Last 24 Hrs  T(C): 37.3 (19 Nov 2020 05:15), Max: 38.6 (18 Nov 2020 20:18)  T(F): 99.1 (19 Nov 2020 05:15), Max: 101.5 (18 Nov 2020 20:18)  HR: 89 (19 Nov 2020 05:15) (83 - 95)  BP: 111/65 (19 Nov 2020 05:15) (111/65 - 128/76)  BP(mean): --  RR: 16 (19 Nov 2020 05:15) (16 - 18)  SpO2: 100% (19 Nov 2020 05:15) (100% - 100%)    LABS:                        7.8    11.15 )-----------( 221      ( 18 Nov 2020 06:22 )             25.2     11-18    148<H>  |  110<H>  |  33<H>  ----------------------------<  84  3.1<L>   |  33<H>  |  1.55<H>    Ca    8.0<L>      18 Nov 2020 06:22  Phos  3.0     11-18  Mg     1.8     11-18    TPro  6.0  /  Alb  2.3<L>  /  TBili  2.7<H>  /  DBili  x   /  AST  89<H>  /  ALT  49  /  AlkPhos  130<H>  11-18      Problem:  Fever of unknown origin    PLAN:   -Acetaminophen ordered  -Blood culture x2  -Monitor temperature    Problem #2:  Hypokalemia     Plan   -Potassium repleted  -Monitor BMP

## 2020-11-20 NOTE — PROGRESS NOTE ADULT - SUBJECTIVE AND OBJECTIVE BOX
had fever still  feel ok  no cough  abd pain, sob, or diarrhea    ROS:  Negative except for:    MEDICATIONS  (STANDING):  ascorbic acid 500 milliGRAM(s) Oral daily  DAPTOmycin IVPB      DAPTOmycin IVPB 350 milliGRAM(s) IV Intermittent every 24 hours  dextrose 5% 1000 milliLiter(s) (50 mL/Hr) IV Continuous <Continuous>  dextrose 5% + sodium chloride 0.45% with potassium chloride 10 mEq/L 100 milliLiter(s) (50 mL/Hr) IV Continuous <Continuous>  enoxaparin Injectable 50 milliGRAM(s) SubCutaneous daily  fluconAZOLE IVPB 200 milliGRAM(s) IV Intermittent every 24 hours  fluconAZOLE IVPB      furosemide    Tablet 40 milliGRAM(s) Oral daily  multivitamin 1 Tablet(s) Oral daily  pantoprazole   Suspension 40 milliGRAM(s) Enteral Tube daily  potassium chloride  10 mEq/100 mL IVPB 10 milliEquivalent(s) IV Intermittent every 1 hour  rifAXIMin 550 milliGRAM(s) Oral two times a day  zinc sulfate 220 milliGRAM(s) Oral daily    MEDICATIONS  (PRN):      Allergies    No Known Allergies    Intolerances        Vital Signs Last 24 Hrs  T(C): 36.8 (20 Nov 2020 05:15), Max: 38.3 (19 Nov 2020 21:08)  T(F): 98.2 (20 Nov 2020 05:15), Max: 101 (20 Nov 2020 00:42)  HR: 85 (20 Nov 2020 05:15) (85 - 103)  BP: 110/69 (20 Nov 2020 05:15) (110/69 - 124/73)  BP(mean): --  RR: 17 (20 Nov 2020 05:15) (16 - 19)  SpO2: 100% (20 Nov 2020 05:15) (96% - 100%)    PHYSICAL EXAM  General: adult in NAD  HEENT: clear oropharynx, anicteric sclera, pink conjunctiva  Neck: supple  CV: normal S1/S2 with no murmur rubs or gallops  Lungs: positive air movement b/l ant lungs,clear to auscultation, no wheezes, no rales  Abdomen: soft non-tender non-distended, no hepatosplenomegaly  Ext: no clubbing cyanosis or edema  Skin: no rashes and no petechiae  Neuro: alert and oriented X 4, no focal deficits  LABS:                          7.5    11.43 )-----------( 244      ( 20 Nov 2020 06:47 )             24.0         Mean Cell Volume : 93.0 fl  Mean Cell Hemoglobin : 29.1 pg  Mean Cell Hemoglobin Concentration : 31.3 gm/dL  Auto Neutrophil # : x  Auto Lymphocyte # : x  Auto Monocyte # : x  Auto Eosinophil # : x  Auto Basophil # : x  Auto Neutrophil % : x  Auto Lymphocyte % : x  Auto Monocyte % : x  Auto Eosinophil % : x  Auto Basophil % : x    Serial CBC  Hematocrit 24.0  Hemoglobin 7.5  Plat 244  RBC 2.58  WBC 11.43  Serial CBC  Hematocrit 25.5  Hemoglobin 8.0  Plat 254  RBC 2.75  WBC 11.12  Serial CBC  Hematocrit 25.2  Hemoglobin 7.8  Plat 221  RBC 2.68  WBC 11.15  Serial CBC  Hematocrit 26.4  Hemoglobin 8.3  Plat 208  RBC 2.82  WBC 10.77    11-20    147<H>  |  110<H>  |  29<H>  ----------------------------<  80  3.1<L>   |  31  |  1.32<H>    Ca    7.6<L>      20 Nov 2020 06:47  Phos  2.9     11-20  Mg     1.6     11-20    TPro  6.0  /  Alb  2.1<L>  /  TBili  2.5<H>  /  DBili  x   /  AST  76<H>  /  ALT  45  /  AlkPhos  148<H>  11-20                    BLOOD SMEAR INTERPRETATION:       RADIOLOGY & ADDITIONAL STUDIES:

## 2020-11-20 NOTE — CHART NOTE - NSCHARTNOTEFT_GEN_A_CORE
EVENT:      66 y/o female with PMH of recurrent SBO's, s/p exp. lap, SB resection in 2015, ex lap, ALBINA in 2018, DVT, PE, on Xarelto, IVC filter, chronic leg swelling, and anasarca, came in to the ED after being sent by PCP Dr. Ross for generalized weakness and hypotension during office visit. Patient was seen by PCP for weakness and chills earlier today and was sent to the ED after she was noted to be hypotensive with BP 80/40. Patient denies fever, SOB, palpitations, changes to her bowel or urinary habits, abdominal pain, urinary symptoms or any other acute complaints.  11/19/20, 21: 09, patient 's T - 100.9F, HR - 103, BP - 124/73, HR - 103. Last Chest X ray - was done 11/11/20.    OBJECTIVE:  Vital Signs Last 24 Hrs  T(C): 38.3 (20 Nov 2020 00:42), Max: 38.3 (19 Nov 2020 21:08)  T(F): 101 (20 Nov 2020 00:42), Max: 101 (20 Nov 2020 00:42)  HR: 103 (19 Nov 2020 21:08) (89 - 103)  BP: 124/73 (19 Nov 2020 21:08) (111/65 - 124/73)  BP(mean): --  RR: 19 (19 Nov 2020 21:08) (16 - 19)  SpO2: 100% (19 Nov 2020 21:08) (96% - 100%)    FOCUSED PHYSICAL EXAM:    LABS:                        8.0    11.12 )-----------( 254      ( 19 Nov 2020 06:44 )             25.5     11-19    148<H>  |  110<H>  |  34<H>  ----------------------------<  79  3.1<L>   |  27  |  1.50<H>    Ca    8.5      19 Nov 2020 06:44  Phos  2.6     11-19  Mg     1.8     11-19    TPro  6.5  /  Alb  2.5<L>  /  TBili  2.8<H>  /  DBili  x   /  AST  89<H>  /  ALT  50  /  AlkPhos  145<H>  11-19    PLAN: 1. Tylenol 650 mg po x1 stat dose, ice pack, 2. Urinalysis, serum lactate and blood culture x 2 sets - pending. Chest x ray, urgent (fever), 3. Hypothermia blanket (for T >102F).      FOLLOW UP / RESULT: Follow-up morning labs results. Safety measures. EVENT:      66 y/o female with PMH of recurrent SBO's, s/p exp. lap, SB resection in 2015, ex lap, ALBINA in 2018, DVT, PE, on Xarelto, IVC filter, chronic leg swelling, and anasarca, came in to the ED after being sent by PCP Dr. Ross for generalized weakness and hypotension during office visit. Patient was seen by PCP for weakness and chills earlier today and was sent to the ED after she was noted to be hypotensive with BP 80/40. Patient denies fever, SOB, palpitations, changes to her bowel or urinary habits, abdominal pain, urinary symptoms or any other acute complaints.  11/19/20, 21: 09, patient 's T - 100.9F, HR - 103, BP - 124/73, HR - 103. Last Chest X ray - was done 11/11/20.  Urinalysis - Positive (+) for UTI. (Urine WBC - 11-25).  OBJECTIVE:  Vital Signs Last 24 Hrs  T(C): 38.3 (20 Nov 2020 00:42), Max: 38.3 (19 Nov 2020 21:08)  T(F): 101 (20 Nov 2020 00:42), Max: 101 (20 Nov 2020 00:42)  HR: 103 (19 Nov 2020 21:08) (89 - 103)  BP: 124/73 (19 Nov 2020 21:08) (111/65 - 124/73)  BP(mean): --  RR: 19 (19 Nov 2020 21:08) (16 - 19)  SpO2: 100% (19 Nov 2020 21:08) (96% - 100%)    FOCUSED PHYSICAL EXAM:    LABS:                        8.0    11.12 )-----------( 254      ( 19 Nov 2020 06:44 )             25.5     11-19    148<H>  |  110<H>  |  34<H>  ----------------------------<  79  3.1<L>   |  27  |  1.50<H>    Ca    8.5      19 Nov 2020 06:44  Phos  2.6     11-19  Mg     1.8     11-19    TPro  6.5  /  Alb  2.5<L>  /  TBili  2.8<H>  /  DBili  x   /  AST  89<H>  /  ALT  50  /  AlkPhos  145<H>  11-19    PLAN: 1. Tylenol 650 mg po x1 stat dose, ice pack, 2. Serum lactate and blood culture x 2 sets - pending. Chest x ray, urgent (fever), 3. Urine culture.     FOLLOW UP / RESULT: Follow-up morning labs results. Safety measures.

## 2020-11-20 NOTE — PROGRESS NOTE ADULT - SUBJECTIVE AND OBJECTIVE BOX
Patient was seen and examined at bedside. Full note to follow Chief Complaint:  Patient is a 65y old  Female who presents with a chief complaint of Hypotension (20 Nov 2020 15:24)      Reason for consult: r/o Budd Chiari    Interval Events:   Patient was seen and examined at bedside. Being treated for infected decubiti. Became febrile. Remain awake, alert, oriented.    Hospital Medications:  ascorbic acid 500 milliGRAM(s) Oral daily  DAPTOmycin IVPB      DAPTOmycin IVPB 350 milliGRAM(s) IV Intermittent every 24 hours  dextrose 5% 1000 milliLiter(s) IV Continuous <Continuous>  dextrose 5% + sodium chloride 0.45% with potassium chloride 10 mEq/L 100 milliLiter(s) IV Continuous <Continuous>  enoxaparin Injectable 50 milliGRAM(s) SubCutaneous daily  fluconAZOLE IVPB 200 milliGRAM(s) IV Intermittent every 24 hours  fluconAZOLE IVPB      furosemide    Tablet 20 milliGRAM(s) Oral daily  multivitamin 1 Tablet(s) Oral daily  pantoprazole   Suspension 40 milliGRAM(s) Enteral Tube daily  rifAXIMin 550 milliGRAM(s) Oral two times a day  zinc sulfate 220 milliGRAM(s) Oral daily      ROS:   General: Had fever 101  ENT:  No sore throat, pain, runny nose  CV:  No pain, palpitations  Pulm:  No dyspnea, cough  GI:  No abdominal pain, N/V  :  No  dysuria  Muscle:  No pain, weakness  Neuro:  Remains AAOx3      PHYSICAL EXAM:   Vital Signs:  Vital Signs Last 24 Hrs  T(C): 36.5 (20 Nov 2020 12:00), Max: 38.3 (19 Nov 2020 21:08)  T(F): 97.7 (20 Nov 2020 12:00), Max: 101 (20 Nov 2020 00:42)  HR: 88 (20 Nov 2020 12:00) (85 - 103)  BP: 126/74 (20 Nov 2020 12:00) (110/69 - 126/74)  BP(mean): --  RR: 18 (20 Nov 2020 12:00) (17 - 19)  SpO2: 100% (20 Nov 2020 12:00) (100% - 100%)  Daily     Daily     GENERAL: no acute distress  NEURO: alert, no asterixis  HEENT: anicteric sclera, no conjunctival pallor appreciated  CHEST: no respiratory distress, no accessory muscle use  CARDIAC: regular rate, rhythm  ABDOMEN: soft, non-tender, non-distended, no rebound or guarding  EXTREMITIES: warm, well perfused, no edema  SKIN: no lesions noted    LABS: reviewed                        7.5    11.43 )-----------( 244      ( 20 Nov 2020 06:47 )             24.0     11-20    147<H>  |  110<H>  |  29<H>  ----------------------------<  80  3.1<L>   |  31  |  1.32<H>    Ca    7.6<L>      20 Nov 2020 06:47  Phos  2.9     11-20  Mg     1.6     11-20    TPro  6.0  /  Alb  2.1<L>  /  TBili  2.5<H>  /  DBili  x   /  AST  76<H>  /  ALT  45  /  AlkPhos  148<H>  11-20    LIVER FUNCTIONS - ( 20 Nov 2020 06:47 )  Alb: 2.1 g/dL / Pro: 6.0 g/dL / ALK PHOS: 148 U/L / ALT: 45 U/L DA / AST: 76 U/L / GGT: x             Interval Diagnostic Studies: see sunrise for full report

## 2020-11-20 NOTE — PROGRESS NOTE ADULT - PROBLEM SELECTOR PLAN 4
Multifactorial: Multiple organ failure, Elevated LFTS, Elevated amnionia level and baseline dementia.  Improving   Elevated NH3 resolved  Lactulose discontinued   Maintain fall and safety precautions.

## 2020-11-20 NOTE — PROGRESS NOTE ADULT - NUTRITIONAL ASSESSMENT
This patient has been assessed with a concern for Malnutrition and has been determined to have a diagnosis/diagnoses of Severe protein-calorie malnutrition.    This patient is being managed with:   Diet Dysphagia 1 Pureed-Nectar Consistency Fluid-  Supplement Feeding Modality:  Oral  Two Papi HN Cans or Servings Per Day:  3       Frequency:  Three Times a day  Ensure Pudding Cans or Servings Per Day:  2       Frequency:  Two Times a day  Entered: Nov 13 2020  1:47PM      This patient has been assessed with a concern for Malnutrition and has been determined to have a diagnosis/diagnoses of Severe protein-calorie malnutrition.    This patient is being managed with:   Diet Dysphagia 1 Pureed-Nectar Consistency Fluid-  Supplement Feeding Modality:  Oral  Two Papi HN Cans or Servings Per Day:  3       Frequency:  Three Times a day  Ensure Pudding Cans or Servings Per Day:  2       Frequency:  Two Times a day  Entered: Nov 13 2020  1:47PM

## 2020-11-20 NOTE — PROGRESS NOTE ADULT - ASSESSMENT
Patient is a 65y old  Female from home, lives with daughter, ambulates with walker with PMH of recurrent SBO's, s/p exp lap, SB resection in 2015, ex lap, ALBINA in 2018, DVT, PE, on Xarelto, IVC filter, chronic leg swelling, and anasarca, Now send in to the ER by her PCP, Dr. Ross, for evaluation of  generalized weakness and hypotension BP of 80/40 during office visit. On admission, she found to have no fever and BP was in lower normal range and no Leukocytosis. The CXR is clear and CT abd/pelvis consistent with Ileus. The ID consult requested to assist with evaluation of infectious etiology of episode of hypotension. Found to have Bacteroides bacteremia and now developed septic shock on Cefepime and Flagyl. Hence transferred to ICU. 10/20/20.    # Hypotension  at office- BP of 80/40 - resolved   #  Bacteroides fragilis Bacteremia - 10/13/20 - ? source most likely GI- Repeat Blood Cxs have NGTD 10/16/20  # Ileus  - h/o recurrent SBO and s/p EXp. LAp  # COVID 19 negative   # Septic shock ( Hypothermia + hypotensive)- transferred to ICU since requiring pressor- source GI, The CT abd/pelvis shows nonspecific enterocolitis, noninfectious inflammatory bowel disease, or ischemic bowel, along with ileus.   # Pneumonia- HCAP vs Aspiration - on CXR 10/24 and CT chest 10/21- sputum Cx grew Methicillin resistant Staphylococcus aureus  and Stenotrophomonas maltophilia.  # S/p extubated 11/9/20  # Infected sacral ulcer- s/p debridement and culture grew Enterococcus and Candida, sensitivity is pending      would recommend:    1. OOB to chair/PT  2. Please obtain Imaging of pelvis to rule out osteomyelitis   3. Continue  Daptomycin to cover VRE  and  Fluconazole to cover Candida in wound culture  4. Monitor kidney function, is improving    5. Aspiration precaution  6. Continue Sacral Wound care  and Frequent Repositioning   7. Monitor CPK level while on Daptomycin    d/w  Nursing staff    Attending Attestation:    Spent more than 45 minutes on total encounter, more than 50 % of the visit was spent counseling and/or coordinating care by the Attending physician.   Patient is a 65y old  Female from home, lives with daughter, ambulates with walker with PMH of recurrent SBO's, s/p exp lap, SB resection in 2015, ex lap, ALBINA in 2018, DVT, PE, on Xarelto, IVC filter, chronic leg swelling, and anasarca, Now send in to the ER by her PCP, Dr. Ross, for evaluation of  generalized weakness and hypotension BP of 80/40 during office visit. On admission, she found to have no fever and BP was in lower normal range and no Leukocytosis. The CXR is clear and CT abd/pelvis consistent with Ileus. The ID consult requested to assist with evaluation of infectious etiology of episode of hypotension. Found to have Bacteroides bacteremia and now developed septic shock on Cefepime and Flagyl. Hence transferred to ICU. 10/20/20.    # Hypotension  at office- BP of 80/40 - resolved   #  Bacteroides fragilis Bacteremia - 10/13/20 - ? source most likely GI- Repeat Blood Cxs have NGTD 10/16/20  # Ileus  - h/o recurrent SBO and s/p EXp. LAp  # COVID 19 negative   # Septic shock ( Hypothermia + hypotensive)- transferred to ICU since requiring pressor- source GI, The CT abd/pelvis shows nonspecific enterocolitis, noninfectious inflammatory bowel disease, or ischemic bowel, along with ileus.   # Pneumonia- HCAP vs Aspiration - on CXR 10/24 and CT chest 10/21- sputum Cx grew Methicillin resistant Staphylococcus aureus  and Stenotrophomonas maltophilia.  # S/p extubated 11/9/20  # Infected sacral ulcer- s/p debridement and culture grew Enterococcus and Candida, sensitivity is pending  # Fever- 11/8 and 11/19- BCX NGTD 11/19    would recommend:    1. OOB to chair/PT  2. Please obtain Imaging of pelvis to rule out osteomyelitis   3. Continue  Daptomycin to cover VRE  and  Fluconazole to cover Candida in sacral wound culture  4. Monitor kidney function, is improving    5. Aspiration precaution  6. Continue Sacral Wound care  and Frequent Repositioning   7. Monitor CPK level while on Daptomycin    d/w  Nursing staff, Family at the bed side and covering NP, TITUS    Attending Attestation:    Spent more than 45 minutes on total encounter, more than 50 % of the visit was spent counseling and/or coordinating care by the Attending physician.

## 2020-11-20 NOTE — PROGRESS NOTE ADULT - ASSESSMENT
65y Female from home, lives with daughter, ambulates with walker with PMH of recurrent SBO's, s/p exp lap, SB resection in 2015, ex lap, ALBINA in 2018, DVT, PE, on Xarelto, IVC filter, chronic leg swelling, and anasarca, came in to the ED due to hypotension during office visit. Patient was found to be bacteremic with bacteroids fragilis. Admitted in ICU due to hypotension and hypothermia. patient completed course of antibiotics . BCx X2 negative. Ammonia level elevated and patient refused NGT placement, mental status deteriorated and patient desaturated to high 70%, patient got intubated on 10/24 . Sputum was positive for MRSA and Stenotrophomonas. patient started on vancomycin and Levaquin. patient was given lactulose for high ammonia level. kidney function and liver function started to deteriorate and patient was leaning toward multi organ failure . high dose of Lasix started and patient was aggressively diuresed. Kidney function improved . Ammonia level dropped to less than 10.  Mental status improved and patient extubated on 11/3/2020. Patient was seen by wound specialist and decubitus ulcer debrided. patient had a drop in h/h s/p debridement . received 1 unit PRBC .   11/12  Transferred from ICU to SCU  `11/17  Midodrine d/c secondary to daptomycin being started.  11/18 Febrile, Tmax 101.5. Repeat BC sent.   11/19 Discontinue velazquez.   11/20 febrile overnight.

## 2020-11-20 NOTE — PROGRESS NOTE ADULT - PROBLEM SELECTOR PLAN 9
DVT and GI prophylaxis  Continue rifaximin  Dapto for total of 10 days  F/u repeat BC  Discharge planning likely to [facility] when medically optimized DVT and GI prophylaxis  Continue rifaximin  Dapto for total of 10 days  Discharge planning likely to [facility] when medically optimized

## 2020-11-20 NOTE — PROGRESS NOTE ADULT - PROBLEM SELECTOR PLAN 1
Secondary to bacteremia . Bacteroides fragilis per BC on 10/13  Sputum positive for MRSA and Stenotrophomas  Aspiration pneumonia vs HCAP   Repeat BC 10/16 and 11/19   NGTD    Infected sacral ulcer- s/p debridement and culture grew Enterococcus and Candida, sensitivity is pending  Febrile overnight, leukocytosis uptrending  c/w Dapto and Fluconazole  velazquez  discontinued 11/19.   Permafit as needed.    BC 11/19 NGTD   Monitor CK weekly while on Dapto  ID Dr. Patricio following         `

## 2020-11-20 NOTE — PROGRESS NOTE ADULT - SUBJECTIVE AND OBJECTIVE BOX
MARIBETH PADILLA    SCU progress note    INTERVAL HPI/OVERNIGHT EVENTS: *** Febrile overnight, Tmax 101F with mild tachycardia. Surgical team f/u pending to re-eval sacral decub ulcer.     DNR [x ]   DNI  [  ]  trial of intubation , trial feeding tube/ trial of IV fluids/abx  Covid - 19 PCR:     The 4Ms    What Matters Most: see GOC  Age appropriate Medications/Screen for High Risk Medication: Yes  Mentation: see CAM below  Mobility: defer to physical exam    The Confusion Assessment Method (CAM) Diagnostic Algorithm     1: Acute Onset or Fluctuating Course  - Is there evidence of an acute change in mental status from the patient’s baseline? Did the (abnormal) behavior  fluctuate during the day, that is, tend to come and go, or increase and decrease in severity?  [ ] YES [ x] NO     2: Inattention  - Did the patient have difficulty focusing attention, being easily distractible, or having difficulty keeping track of what was being said?  [ ] YES [x ] NO     3: Disorganized thinking  -Was the patient’s thinking disorganized or incoherent, such as rambling or irrelevant conversation, unclear or illogical flow of ideas, or unpredictable switching from subject to subject?  [ ] YES [x ] NO    4: Altered Level of consciousness?  [ ] YES [ x] NO    The diagnosis of delirium by CAM requires the presence of features 1 and 2 and either 3 or 4.    PRESSORS: [ ] YES [x ] NO  DAPTOmycin IVPB      DAPTOmycin IVPB 350 milliGRAM(s) IV Intermittent every 24 hours  fluconAZOLE IVPB 200 milliGRAM(s) IV Intermittent every 24 hours  fluconAZOLE IVPB      rifAXIMin 550 milliGRAM(s) Oral two times a day    Cardiovascular:  Heart Failure    Chronic         Pulmonary:    Hematalogic:  enoxaparin Injectable 50 milliGRAM(s) SubCutaneous daily    Other:  ascorbic acid 500 milliGRAM(s) Oral daily  dextrose 5% 1000 milliLiter(s) IV Continuous <Continuous>  dextrose 5% + sodium chloride 0.45% with potassium chloride 10 mEq/L 100 milliLiter(s) IV Continuous <Continuous>  multivitamin 1 Tablet(s) Oral daily  pantoprazole   Suspension 40 milliGRAM(s) Enteral Tube daily  potassium chloride  10 mEq/100 mL IVPB 10 milliEquivalent(s) IV Intermittent every 1 hour  zinc sulfate 220 milliGRAM(s) Oral daily    ascorbic acid 500 milliGRAM(s) Oral daily  DAPTOmycin IVPB      DAPTOmycin IVPB 350 milliGRAM(s) IV Intermittent every 24 hours  dextrose 5% 1000 milliLiter(s) IV Continuous <Continuous>  dextrose 5% + sodium chloride 0.45% with potassium chloride 10 mEq/L 100 milliLiter(s) IV Continuous <Continuous>  enoxaparin Injectable 50 milliGRAM(s) SubCutaneous daily  fluconAZOLE IVPB 200 milliGRAM(s) IV Intermittent every 24 hours  fluconAZOLE IVPB      multivitamin 1 Tablet(s) Oral daily  pantoprazole   Suspension 40 milliGRAM(s) Enteral Tube daily  potassium chloride  10 mEq/100 mL IVPB 10 milliEquivalent(s) IV Intermittent every 1 hour  rifAXIMin 550 milliGRAM(s) Oral two times a day  zinc sulfate 220 milliGRAM(s) Oral daily    Drug Dosing Weight  Height (cm): 167.6 (21 Oct 2020 02:26)  Weight (kg): 56.2 (21 Oct 2020 02:26)  BMI (kg/m2): 20 (21 Oct 2020 02:26)  BSA (m2): 1.63 (21 Oct 2020 02:26)    CENTRAL LINE: [ ] YES [x ] NO  LOCATION:   DATE INSERTED:  REMOVE: [ ] YES [ ] NO  EXPLAIN:    TREJO: [ ] YES [x ] NO    DATE INSERTED:  REMOVE:  [ ] YES [ ] NO  EXPLAIN:    PAST MEDICAL & SURGICAL HISTORY:  Anasarca    Pulmonary embolism    DVT (deep venous thrombosis)    SBO (small bowel obstruction)    H/O exploratory laparotomy                 @ 07:01  -   @ 07:00  --------------------------------------------------------  IN: 0 mL / OUT: 900 mL / NET: -900 mL            PHYSICAL EXAM:    GENERAL:cachectic ,  NAD,  HEAD:  Atraumatic, Normocephalic  EYES: EOMI, PERRLA, conjunctiva and sclera clear  ENMT: No tonsillar erythema, exudates, or enlargement; Moist mucous membranes,  No lesions  NECK: Supple, No JVD, Normal thyroid  NERVOUS SYSTEM:  Alert & Oriented X3, Follows simple commands, Motor Strength 5/5 BUE, 4/5 BLE  CHEST/LUNG:Effort normal.  Clear bilaterally; No rales, rhonchi, wheezing, or rubs  HEART: Regular rate and rhythm; No murmurs, rubs, or gallops  ABDOMEN: Soft, Nontender, Nondistended; Bowel sounds present  EXTREMITIES:  2+ Peripheral Pulses, No clubbing, cyanosis, or edema  LYMPH: No lymphadenopathy noted  SKIN: Unstageable sacral ulcer       LABS:  CBC Full  -  ( 2020 06:47 )  WBC Count : 11.43 K/uL  RBC Count : 2.58 M/uL  Hemoglobin : 7.5 g/dL  Hematocrit : 24.0 %  Platelet Count - Automated : 244 K/uL  Mean Cell Volume : 93.0 fl  Mean Cell Hemoglobin : 29.1 pg  Mean Cell Hemoglobin Concentration : 31.3 gm/dL  Auto Neutrophil # : x  Auto Lymphocyte # : x  Auto Monocyte # : x  Auto Eosinophil # : x  Auto Basophil # : x  Auto Neutrophil % : x  Auto Lymphocyte % : x  Auto Monocyte % : x  Auto Eosinophil % : x  Auto Basophil % : x        147<H>  |  110<H>  |  29<H>  ----------------------------<  80  3.1<L>   |  31  |  1.32<H>    Ca    7.6<L>      2020 06:47  Phos  2.9       Mg     1.6         TPro  6.0  /  Alb  2.1<L>  /  TBili  2.5<H>  /  DBili  x   /  AST  76<H>  /  ALT  45  /  AlkPhos  148<H>  20      Urinalysis Basic - ( 2020 04:12 )    Color: Yellow / Appearance: Clear / S.005 / pH: x  Gluc: x / Ketone: Trace  / Bili: Negative / Urobili: Negative   Blood: x / Protein: 30 mg/dL / Nitrite: Negative   Leuk Esterase: Moderate / RBC: 5-10 /HPF / WBC 11-25 /HPF   Sq Epi: x / Non Sq Epi: Few /HPF / Bacteria: Moderate /HPF            [  ]  DVT Prophylaxis  [  ]  Nutrition, Brand, Rate         Goal Rate        Abnormal Nutritional Status -  Malnutrition   Cachexia         RADIOLOGY & ADDITIONAL STUDIES:  ***    Goals of Care Discussion with Family/Proxy/Other   - see note from/family meetin/18

## 2020-11-20 NOTE — PROGRESS NOTE ADULT - SUBJECTIVE AND OBJECTIVE BOX
Patient is seen and examined at the bed side, is afebrile.  She remains OFF oxygen , saturating well at room Air.       REVIEW OF SYSTEMS: All other review systems are negative        ALLERGIES: No Known Allergies        Vital Signs Last 24 Hrs  T(C): 36.5 (20 Nov 2020 12:00), Max: 38.3 (19 Nov 2020 21:08)  T(F): 97.7 (20 Nov 2020 12:00), Max: 101 (20 Nov 2020 00:42)  HR: 88 (20 Nov 2020 12:00) (85 - 103)  BP: 126/74 (20 Nov 2020 12:00) (110/69 - 126/74)  BP(mean): --  RR: 18 (20 Nov 2020 12:00) (17 - 19)  SpO2: 100% (20 Nov 2020 12:00) (100% - 100%)      PHYSICAL EXAM:  GENERAL: Not in distress, on oxygen via NC  CHEST/LUNG: Not using accessory muscles   HEART: s1 and s2 present  ABDOMEN:  Nontender and  Nondistended  EXTREMITIES: B/L UE edematous  CNS: Awake and Alert         LABS:                        7.5    11.43 )-----------( 244      ( 20 Nov 2020 06:47 )             24.0                           8.0    11.12 )-----------( 254      ( 19 Nov 2020 06:44 )             25.5                         9.4    13.14 )-----------( 149      ( 14 Nov 2020 07:58 )             28.7           11-20    147<H>  |  110<H>  |  29<H>  ----------------------------<  80  3.1<L>   |  31  |  1.32<H>    Ca    7.6<L>      20 Nov 2020 06:47  Phos  2.9     11-20  Mg     1.6     11-20    TPro  6.0  /  Alb  2.1<L>  /  TBili  2.5<H>  /  DBili  x   /  AST  76<H>  /  ALT  45  /  AlkPhos  148<H>  11-20 11-19    148<H>  |  110<H>  |  34<H>  ----------------------------<  79  3.1<L>   |  27  |  1.50<H>    Ca    8.5      19 Nov 2020 06:44  Phos  2.6     11-19  Mg     1.8     11-19    TPro  6.5  /  Alb  2.5<L>  /  TBili  2.8<H>  /  DBili  x   /  AST  89<H>  /  ALT  50  /  AlkPhos  145<H>  11-19 11-06    138  |  104  |  37<H>  ----------------------------<  81  3.9   |  25  |  2.37<H>    Ca    8.1<L>      06 Nov 2020 06:20  Phos  3.4     11-06  Mg     2.0     11-06    TPro  5.1<L>  /  Alb  2.8<L>  /  TBili  9.2<H>  /  DBili  x   /  AST  233<H>  /  ALT  99<H>  /  AlkPhos  96  11-06      Vancomycin Level, Trough (11.07.20 @ 21:49)   Vancomycin Level, Trough: 16.1:     Vancomycin Level, Trough (11.07.20 @ 07:08)   Vancomycin Level, Trough: 19.5    vanVancomycin Level, Trough (11.06.20 @ 06:20)   Vancomycin Level, Trough: 19.9:         MEDICATIONS  (STANDING):    ascorbic acid 500 milliGRAM(s) Oral daily  DAPTOmycin IVPB      DAPTOmycin IVPB 350 milliGRAM(s) IV Intermittent every 24 hours  dextrose 5% 1000 milliLiter(s) (50 mL/Hr) IV Continuous <Continuous>  dextrose 5% + sodium chloride 0.45% with potassium chloride 10 mEq/L 100 milliLiter(s) (50 mL/Hr) IV Continuous <Continuous>  enoxaparin Injectable 50 milliGRAM(s) SubCutaneous daily  fluconAZOLE IVPB 200 milliGRAM(s) IV Intermittent every 24 hours  fluconAZOLE IVPB      furosemide    Tablet 20 milliGRAM(s) Oral daily  multivitamin 1 Tablet(s) Oral daily  pantoprazole   Suspension 40 milliGRAM(s) Enteral Tube daily  rifAXIMin 550 milliGRAM(s) Oral two times a day  zinc sulfate 220 milliGRAM(s) Oral daily      RADIOLOGY & ADDITIONAL TESTS:    11/4/20 : Xray Chest 1 View- PORTABLE-Routine (Xray Chest 1 View- PORTABLE-Routine in AM.) (11.04.20 @ 10:59) Reexpansion of the left lung with residual left pleural effusion and/or infiltrate.  Right pulmonary edema unchanged.  Tubes and catheters in satisfactory position.      10/25/20: Xray Chest 1 View- PORTABLE-Routine (Xray Chest 1 View- PORTABLE-Routine in AM.) (10.25.20 @ 09:21) Frontal expiratory view of the chest shows the heart to be similar in size. Endotracheal tube, right jugular line and feeding tube remain present.    The lungs show similar left upper lobe infiltrate with progression of right perihilar infiltrate. Pleural effusions are similar. There is no evidence of pneumothorax.      10/23/20 : Xray Chest 1 View-PORTABLE IMMEDIATE (Xray Chest 1 View-PORTABLE IMMEDIATE .) (10.23.20 @ 19:09) Feeding tube tip near GE junction as noted. Questionable right upper lobe infiltrate. Follow-up study is recommended as clinically warranted.      10/21/20 : CT Abdomen and Pelvis w/ Oral Cont and w/ IV Cont (10.21.20 @ 15:59) Mural thickening of the left colon and rectum. Liquid stool in the colon. Apparent segmental mural thickening of the mid to distal small bowel and the proximal duodenum. Findings may represent nonspecific enterocolitis, noninfectious inflammatory bowel disease, or ischemic bowel. Clinical correlation is recommended. The celiac axis artery, SMA, and KINA are patent without stenosis.    Dilatation of the mid small bowel is again noted. Oral contrast has reached the terminal ileum. Findings may represent small bowel obstruction, or ileus related to nonspecific enterocolitis.   Cholelithiasis.    Moderate to large ascites in the abdomen, increased since the previous examination. 5 mm nonspecific noncalcified left upper lobe lung nodule; if the patient's is in the high risk category (i.e. smoker), follow-up chest CT may be pursued in 12 months to ensure stability.    Combination of atelectasis and consolidation in the left lower lobe.    Possible 1.0 cm hypodense lesion in the left lobe of the thyroid. Thyroid ultrasound may be pursued for further evaluation.   Mild bilateral pleural effusions.    Aging determinate compression fracture at T5 vertebra.        10/13/20 : CT Abdomen and Pelvis w/ IV Cont (10.13.20 @ 18:50) Diffuse dilatation of small bowel loops without a clear transition is slightly increased, likely an ileus.  Decreased moderate ascites.    1.5 cm pancreatic versus peripancreatic soft tissue nodule    10/13/20 : Xray Chest 1 View- PORTABLE-Urgent (Xray Chest 1 View- PORTABLE-Urgent .) (10.13.20 @ 16:34) Clear lungs.          MICROBIOLOGY DATA:    Culture - Surgical Swab (11.12.20 @ 05:01)   Specimen Source: .Surgical Swab Sacral decub   Culture Results:  Few Enterococcus faecium Susceptibility to follow.   Rare Candida albicans "Susceptibilities not performed"     Culture - Sputum . (10.26.20 @ 00:26)   - Ampicillin/Sulbactam: R <=8/4   - Cefazolin: R >16   - Ceftazidime: I 16   - Clindamycin: R >4   - Erythromycin: R >4   - Gentamicin: S <=1 Should not be used as monotherapy   - Levofloxacin: S <=0.5   - Linezolid: S 2   - Oxacillin: R >2   - Penicillin: R >8   - RIF- Rifampin: S <=1 Should not be used as monotherapy   - Tetra/Doxy: S <=1   - Trimethoprim/Sulfamethoxazole: S <=0.5/9.5   - Trimethoprim/Sulfamethoxazole: S <=0.5/9.5   - Vancomycin: S 1     MRSA/MSSA PCR (10.21.20 @ 09:41)   MRSA PCR Result.: Detected:       Culture - Blood (10.20.20 @ 22:09)   Specimen Source: .Blood Blood   Culture Results:  No growth to date.     Culture - Blood (10.20.20 @ 22:09)   Specimen Source: .Blood Blood   Culture Results:   No growth to date.     Culture - Blood in AM (10.16.20 @ 10:13)   Specimen Source: .Blood Blood-Peripheral   Culture Results: No growth to date.     Culture - Blood in AM (10.16.20 @ 10:13)   Specimen Source: .Blood Blood-Peripheral   Culture Results: No growth to date.     Culture - Blood (10.13.20 @ 22:23)   Growth in anaerobic bottle: Gram Negative Rods   Specimen Source: .Blood Blood-Peripheral   Organism: Blood Culture PCR   Culture Results: Growth in anaerobic bottle: Bacteroides fragilis   "Susceptibilities not performed"     Culture - Blood (10.13.20 @ 22:23)   Specimen Source: .Blood Blood-Peripheral   Culture Results:   No growth to date.        Patient is seen and examined at the bed side, is afebrile now, spiking fever over nights. The blood cultures as of 11/19 are negative to date. The CXR shows Improvement in bilateral airspace disease with persistent bibasilar consolidation. She is s/p removal of velazquez catheter and passed TOV.      REVIEW OF SYSTEMS: All other review systems are negative      ALLERGIES: No Known Allergies      Vital Signs Last 24 Hrs  T(C): 36.5 (20 Nov 2020 12:00), Max: 38.3 (19 Nov 2020 21:08)  T(F): 97.7 (20 Nov 2020 12:00), Max: 101 (20 Nov 2020 00:42)  HR: 88 (20 Nov 2020 12:00) (85 - 103)  BP: 126/74 (20 Nov 2020 12:00) (110/69 - 126/74)  BP(mean): --  RR: 18 (20 Nov 2020 12:00) (17 - 19)  SpO2: 100% (20 Nov 2020 12:00) (100% - 100%)      PHYSICAL EXAM:  GENERAL: Not in distress, on oxygen via NC  CHEST/LUNG: Not using accessory muscles   HEART: s1 and s2 present  ABDOMEN:  Nontender and  Nondistended  EXTREMITIES: B/L UE edematous improved  CNS: Awake and Alert         LABS:                        7.5    11.43 )-----------( 244      ( 20 Nov 2020 06:47 )             24.0                           8.0    11.12 )-----------( 254      ( 19 Nov 2020 06:44 )             25.5                         9.4    13.14 )-----------( 149      ( 14 Nov 2020 07:58 )             28.7           11-20    147<H>  |  110<H>  |  29<H>  ----------------------------<  80  3.1<L>   |  31  |  1.32<H>    Ca    7.6<L>      20 Nov 2020 06:47  Phos  2.9     11-20  Mg     1.6     11-20    TPro  6.0  /  Alb  2.1<L>  /  TBili  2.5<H>  /  DBili  x   /  AST  76<H>  /  ALT  45  /  AlkPhos  148<H>  11-20 11-19    148<H>  |  110<H>  |  34<H>  ----------------------------<  79  3.1<L>   |  27  |  1.50<H>    Ca    8.5      19 Nov 2020 06:44  Phos  2.6     11-19  Mg     1.8     11-19    TPro  6.5  /  Alb  2.5<L>  /  TBili  2.8<H>  /  DBili  x   /  AST  89<H>  /  ALT  50  /  AlkPhos  145<H>  11-19 11-06    138  |  104  |  37<H>  ----------------------------<  81  3.9   |  25  |  2.37<H>    Ca    8.1<L>      06 Nov 2020 06:20  Phos  3.4     11-06  Mg     2.0     11-06    TPro  5.1<L>  /  Alb  2.8<L>  /  TBili  9.2<H>  /  DBili  x   /  AST  233<H>  /  ALT  99<H>  /  AlkPhos  96  11-06      Vancomycin Level, Trough (11.07.20 @ 21:49)   Vancomycin Level, Trough: 16.1:     Vancomycin Level, Trough (11.07.20 @ 07:08)   Vancomycin Level, Trough: 19.5    vanVancomycin Level, Trough (11.06.20 @ 06:20)   Vancomycin Level, Trough: 19.9:         MEDICATIONS  (STANDING):    ascorbic acid 500 milliGRAM(s) Oral daily  DAPTOmycin IVPB      DAPTOmycin IVPB 350 milliGRAM(s) IV Intermittent every 24 hours  dextrose 5% 1000 milliLiter(s) (50 mL/Hr) IV Continuous <Continuous>  dextrose 5% + sodium chloride 0.45% with potassium chloride 10 mEq/L 100 milliLiter(s) (50 mL/Hr) IV Continuous <Continuous>  enoxaparin Injectable 50 milliGRAM(s) SubCutaneous daily  fluconAZOLE IVPB 200 milliGRAM(s) IV Intermittent every 24 hours  fluconAZOLE IVPB      furosemide    Tablet 20 milliGRAM(s) Oral daily  multivitamin 1 Tablet(s) Oral daily  pantoprazole   Suspension 40 milliGRAM(s) Enteral Tube daily  rifAXIMin 550 milliGRAM(s) Oral two times a day  zinc sulfate 220 milliGRAM(s) Oral daily      RADIOLOGY & ADDITIONAL TESTS:      11/19/20 : Xray Chest 1 View- PORTABLE-Urgent (Xray Chest 1 View- PORTABLE-Urgent .) (11.19.20 @ 23:53): Improvement in bilateral airspace disease with persistent bibasilar consolidation and small effusions.    11/4/20 : Xray Chest 1 View- PORTABLE-Routine (Xray Chest 1 View- PORTABLE-Routine in AM.) (11.04.20 @ 10:59) Reexpansion of the left lung with residual left pleural effusion and/or infiltrate.  Right pulmonary edema unchanged.  Tubes and catheters in satisfactory position.      10/25/20: Xray Chest 1 View- PORTABLE-Routine (Xray Chest 1 View- PORTABLE-Routine in AM.) (10.25.20 @ 09:21) Frontal expiratory view of the chest shows the heart to be similar in size. Endotracheal tube, right jugular line and feeding tube remain present. The lungs show similar left upper lobe infiltrate with progression of right perihilar infiltrate. Pleural effusions are similar. There is no evidence of pneumothorax.    10/21/20 : CT Abdomen and Pelvis w/ Oral Cont and w/ IV Cont (10.21.20 @ 15:59) Mural thickening of the left colon and rectum. Liquid stool in the colon. Apparent segmental mural thickening of the mid to distal small bowel and the proximal duodenum. Findings may represent nonspecific enterocolitis, noninfectious inflammatory bowel disease, or ischemic bowel. Clinical correlation is recommended. The celiac axis artery, SMA, and KINA are patent without stenosis.    Dilatation of the mid small bowel is again noted. Oral contrast has reached the terminal ileum. Findings may represent small bowel obstruction, or ileus related to nonspecific enterocolitis.   Cholelithiasis. Moderate to large ascites in the abdomen, increased since the previous examination. 5 mm nonspecific noncalcified left upper lobe lung nodule; if the patient's is in the high risk category (i.e. smoker), follow-up chest CT may be pursued in 12 months to ensure stability. Combination of atelectasis and consolidation in the left lower lobe.  Possible 1.0 cm hypodense lesion in the left lobe of the thyroid. Thyroid ultrasound may be pursued for further evaluation.   Mild bilateral pleural effusions.  Aging determinate compression fracture at T5 vertebra.          MICROBIOLOGY DATA:    Urine Microscopic-Add On (NC) (11.20.20 @ 04:12)   Red Blood Cell - Urine: 5-10 /HPF   White Blood Cell - Urine: 11-25 /HPF   Calcium Oxalate Crystals: Few /HPF   Bacteria: Moderate /HPF   Comment - Urine: few amorphous urates   Epithelial Cells: Few /HPF     Culture - Blood (11.19.20 @ 10:19)   Specimen Source: .Blood Blood-Peripheral   Culture Results: No growth to date.     Culture - Blood (11.19.20 @ 10:19)   Specimen Source: .Blood Blood-Peripheral   Culture Results: No growth to date.     Culture - Surgical Swab (11.12.20 @ 05:01)   Specimen Source: .Surgical Swab Sacral decub   Culture Results:  Few Enterococcus faecium Susceptibility to follow.   Rare Candida albicans "Susceptibilities not performed"     Culture - Sputum . (10.26.20 @ 00:26)   - Ampicillin/Sulbactam: R <=8/4   - Cefazolin: R >16   - Ceftazidime: I 16   - Clindamycin: R >4   - Erythromycin: R >4   - Gentamicin: S <=1 Should not be used as monotherapy   - Levofloxacin: S <=0.5   - Linezolid: S 2   - Oxacillin: R >2   - Penicillin: R >8   - RIF- Rifampin: S <=1 Should not be used as monotherapy   - Tetra/Doxy: S <=1   - Trimethoprim/Sulfamethoxazole: S <=0.5/9.5   - Trimethoprim/Sulfamethoxazole: S <=0.5/9.5   - Vancomycin: S 1     MRSA/MSSA PCR (10.21.20 @ 09:41)   MRSA PCR Result.: Detected:       Culture - Blood (10.20.20 @ 22:09)   Specimen Source: .Blood Blood   Culture Results:  No growth to date.     Culture - Blood (10.20.20 @ 22:09)   Specimen Source: .Blood Blood   Culture Results:   No growth to date.     Culture - Blood in AM (10.16.20 @ 10:13)   Specimen Source: .Blood Blood-Peripheral   Culture Results: No growth to date.     Culture - Blood in AM (10.16.20 @ 10:13)   Specimen Source: .Blood Blood-Peripheral   Culture Results: No growth to date.     Culture - Blood (10.13.20 @ 22:23)   Growth in anaerobic bottle: Gram Negative Rods   Specimen Source: .Blood Blood-Peripheral   Organism: Blood Culture PCR   Culture Results: Growth in anaerobic bottle: Bacteroides fragilis   "Susceptibilities not performed"     Culture - Blood (10.13.20 @ 22:23)   Specimen Source: .Blood Blood-Peripheral   Culture Results:   No growth to date.

## 2020-11-20 NOTE — PROGRESS NOTE ADULT - SUBJECTIVE AND OBJECTIVE BOX
Patient was seen and examined  Patient is a 65y old  Female who presents with a chief complaint of Hypotension (2020 15:24)      INTERVAL HPI/OVERNIGHT EVENTS:  T(C): 36.5 (20 @ 12:00), Max: 38.3 (20 @ 21:08)  HR: 88 (20 @ 12:00) (85 - 103)  BP: 126/74 (20 @ 12:00) (110/69 - 126/74)  RR: 18 (20 @ 12:00) (17 - 19)  SpO2: 100% (20 @ 12:00) (100% - 100%)  Wt(kg): --  I&O's Summary    2020 07:01  -  2020 07:00  --------------------------------------------------------  IN: 0 mL / OUT: 900 mL / NET: -900 mL        LABS:                        7.5    11.43 )-----------( 244      ( 2020 06:47 )             24.0         147<H>  |  110<H>  |  29<H>  ----------------------------<  80  3.1<L>   |  31  |  1.32<H>    Ca    7.6<L>      2020 06:47  Phos  2.9       Mg     1.6         TPro  6.0  /  Alb  2.1<L>  /  TBili  2.5<H>  /  DBili  x   /  AST  76<H>  /  ALT  45  /  AlkPhos  148<H>  20      Urinalysis Basic - ( 2020 04:12 )    Color: Yellow / Appearance: Clear / S.005 / pH: x  Gluc: x / Ketone: Trace  / Bili: Negative / Urobili: Negative   Blood: x / Protein: 30 mg/dL / Nitrite: Negative   Leuk Esterase: Moderate / RBC: 5-10 /HPF / WBC 11-25 /HPF   Sq Epi: x / Non Sq Epi: Few /HPF / Bacteria: Moderate /HPF      CAPILLARY BLOOD GLUCOSE      POCT Blood Glucose.: 99 mg/dL (2020 06:20)  POCT Blood Glucose.: 121 mg/dL (2020 00:07)          Urinalysis Basic - ( 2020 04:12 )    Color: Yellow / Appearance: Clear / S.005 / pH: x  Gluc: x / Ketone: Trace  / Bili: Negative / Urobili: Negative   Blood: x / Protein: 30 mg/dL / Nitrite: Negative   Leuk Esterase: Moderate / RBC: 5-10 /HPF / WBC 11-25 /HPF   Sq Epi: x / Non Sq Epi: Few /HPF / Bacteria: Moderate /HPF        MEDICATIONS  (STANDING):  ascorbic acid 500 milliGRAM(s) Oral daily  cefepime   IVPB      cefepime   IVPB 1000 milliGRAM(s) IV Intermittent once  DAPTOmycin IVPB      DAPTOmycin IVPB 350 milliGRAM(s) IV Intermittent every 24 hours  dextrose 5% 1000 milliLiter(s) (50 mL/Hr) IV Continuous <Continuous>  dextrose 5% + sodium chloride 0.45% with potassium chloride 10 mEq/L 100 milliLiter(s) (50 mL/Hr) IV Continuous <Continuous>  enoxaparin Injectable 50 milliGRAM(s) SubCutaneous daily  fluconAZOLE IVPB 200 milliGRAM(s) IV Intermittent every 24 hours  fluconAZOLE IVPB      furosemide    Tablet 20 milliGRAM(s) Oral daily  multivitamin 1 Tablet(s) Oral daily  pantoprazole   Suspension 40 milliGRAM(s) Enteral Tube daily  rifAXIMin 550 milliGRAM(s) Oral two times a day  zinc sulfate 220 milliGRAM(s) Oral daily    MEDICATIONS  (PRN):      RADIOLOGY & ADDITIONAL TESTS:    Imaging Personally Reviewed:  [ ] YES  [ ] NO    REVIEW OF SYSTEMS:  CONSTITUTIONAL: No fever, weight loss, or fatigue  EYES: No eye pain, visual disturbances, or discharge  ENMT:  No difficulty hearing, tinnitus, vertigo; No sinus or throat pain  NECK: No pain or stiffness  BREASTS: No pain, masses, or nipple discharge  RESPIRATORY: No cough, wheezing, chills or hemoptysis; No shortness of breath  CARDIOVASCULAR: No chest pain, palpitations, dizziness, or leg swelling  GASTROINTESTINAL: No abdominal or epigastric pain. No nausea, vomiting, or hematemesis; No diarrhea or constipation. No melena or hematochezia.  GENITOURINARY: No dysuria, frequency, hematuria, or incontinence  NEUROLOGICAL: No headaches, memory loss, loss of strength, numbness, or tremors  SKIN: No itching, burning, rashes, or lesions   LYMPH NODES: No enlarged glands  ENDOCRINE: No heat or cold intolerance; No hair loss  MUSCULOSKELETAL: No joint pain or swelling; No muscle, back, or extremity pain  PSYCHIATRIC: No depression, anxiety, mood swings, or difficulty sleeping  HEME/LYMPH: No easy bruising, or bleeding gums  ALLERY AND IMMUNOLOGIC: No hives or eczema      Consultant(s) Notes Reviewed:  [ x ] YES  [ ] NO    PHYSICAL EXAM:  GENERAL: NAD, well-groomed, well-developed  HEAD:  Atraumatic, Normocephalic  EYES: EOMI, PERRLA, conjunctiva and sclera clear  ENMT: No tonsillar erythema, exudates, or enlargement; Moist mucous membranes, Good dentition, No lesions  NECK: Supple, No JVD, Normal thyroid  NERVOUS SYSTEM:  Alert & Oriented X3, Good concentration; Motor Strength 5/5 B/L upper and lower extremities; DTRs 2+ intact and symmetric  CHEST/LUNG: Clear to percussion bilaterally; No rales, rhonchi, wheezing, or rubs  HEART: Regular rate and rhythm; No murmurs, rubs, or gallops  ABDOMEN: Soft, Nontender, Nondistended; Bowel sounds present  EXTREMITIES:  2+ Peripheral Pulses, No clubbing, cyanosis, or edema  LYMPH: No lymphadenopathy noted  SKIN: No rashes or lesions    Care Discussed with Consultants/Other Providers [ x] YES  [ ] NO

## 2020-11-20 NOTE — PROGRESS NOTE ADULT - ASSESSMENT
65y Female from home, lives with daughter, ambulates with walker with PMH of recurrent SBO's, s/p exp lap, SB resection in 2015, ex lap, ALBINA in 2018, DVT, PE, on Xarelto, IVC filter, chronic leg swelling, and anasarca, came in to the ED due to hypotension during office visit. Patient was found to be bacteremic with bacteroids fragilis. Admitted in ICU due to hypotension and hypothermia. patient completed course of antibiotics . BCx X2 negative. Ammonia level elevated and patient refused NGT placement, mental status deteriorated and patient desaturated to high 70%, patient got intubated on 10/24 . Sputum was positive for MRSA and Stenotrophomonas. patient started on vancomycin and Levaquin. patient was given lactulose for high ammonia level. kidney function and liver function started to deteriorate and patient was leaning toward multi organ failure . high dose of Lasix started and patient was aggressively diuresed. Kidney function improved . Ammonia level dropped to less than 10.  Mental status improved and patient extubated on 11/3/2020. Patient was seen by wound specialist and decubitus ulcer debrided. patient had a drop in h/h s/p debridement . received 1 unit PRBC .   11/12  Transferred from ICU to SCU  `11/17  Midodrine d/c secondary to daptomycin being started.  11/18 Febrile, Tmax 101.5. Repeat BC sent.   11/19 Discontinue velazquez.   11/20 febrile overnight.   REPLACE PARTHA

## 2020-11-20 NOTE — PROGRESS NOTE ADULT - ASSESSMENT
66 yo Female with Hx of recurrent SBO, s/p SB resection in 2015, s/p lysis of adhesions in 2018, Hx of DVT, PE (was on Xarelto), IVC filter was admitted with sepsis, found to have Bacteroides fragilis bacteremia, transferred to ICU b/o hypotension, hypothermia, was on pressors, intubated, developed HCAP/aspirational PNA, sputum growing MRSA and Stenotrophomonas, MOF with EF 10-15%, encephalopathy, worsening liver tests; successfully treated with antibiotics / HF mgmt., now extubated with improved mental status, and improved liver tests, but with infected sacral decubiti.      - Possibly the combination of HF, sepsis, significantly improved, but still with hyperbilirubinemia (2.5), AST 76, ALT 45, , without encephalopathy, cannot rule out indeed underlying vascular condition.  - Repeat US w Doppler in ICU was not successful now with patient improvement, can attempt again to visualize HV  - Prior admission patient was referred to [facility] for workup for possible HV thrombosis, patient can follow there upon discharge as previously planned, but now still being treated for decubiti, febrile again and also has EF 10-15%.    Will continue to follow  D/w primary team  Thank you for consult

## 2020-11-20 NOTE — CHART NOTE - NSCHARTNOTEFT_GEN_A_CORE
EVENT: Temp 100.3 now    BRIEF HPI: 65y Female from home, lives with daughter, ambulates with walker with PMH of recurrent SBO's, s/p exp lap, SB resection in 2015, ex lap, ALBINA in 2018, DVT, PE, on Xarelto, IVC filter, chronic leg swelling, and anasarca, came in to the ED due to hypotension during office visit. Patient was found to be bacteremic with bacteroids fragilis. Admitted in ICU due to hypotension and hypothermia. patient completed course of antibiotics . BCx X2 negative. Ammonia level elevated and patient refused NGT placement, mental status deteriorated and patient desaturated to high 70%, patient got intubated on 10/24 . Sputum was positive for MRSA and Stenotrophomonas. patient started on vancomycin and Levaquin. patient was given lactulose for high ammonia level. kidney function and liver function started to deteriorate and patient was leaning toward multi organ failure . high dose of Lasix started and patient was aggressively diuresed. Kidney function improved . Ammonia level dropped to less than 10.  Mental status improved and patient extubated on 11/3/2020. Patient was seen by wound specialist and decubitus ulcer debrided. patient had a drop in h/h s/p debridement . received 1 unit PRBC . 11/12  Transferred from ICU to SCU; 11/17  Midodrine d/c secondary to daptomycin being started;11/18 Febrile, Tmax 101.5. Repeat BC sent.; 11/19 Discontinue velazquez;11/20 febrile overnight.  K 3.1, replaced, febrile again tonight.    OBJECTIVE:  Vital Signs Last 24 Hrs  T(C): 37.9 (20 Nov 2020 20:14), Max: 38.3 (20 Nov 2020 00:42)  T(F): 100.3 (20 Nov 2020 20:14), Max: 101 (20 Nov 2020 00:42)  HR: 106 (20 Nov 2020 20:14) (85 - 106)  BP: 124/72 (20 Nov 2020 20:14) (110/69 - 126/74)  BP(mean): --  RR: 16 (20 Nov 2020 20:14) (16 - 18)  SpO2: 100% (20 Nov 2020 20:14) (100% - 100%)    FOCUSED PHYSICAL EXAM:  Skin: Skin: sacrum wound measuring [id number], granulating tissue at base, no active drainage, wet to dr  NEURO: Alert, oriented X 3  RESP: Even, unlabored, decreased at bases  CV: S1 S2, regular  LABS:                        7.5    11.43 )-----------( 244      ( 20 Nov 2020 06:47 )             24.0   CARDIAC MARKERS ( 20 Nov 2020 06:47 )  x     / x     / 71 U/L / x     / x        11-20    147<H>  |  110<H>  |  29<H>  ----------------------------<  80  3.1<L>   |  31  |  1.32<H>    Ca    7.6<L>      20 Nov 2020 06:47  Phos  2.9     11-20  Mg     1.6     11-20    TPro  6.0  /  Alb  2.1<L>  /  TBili  2.5<H>  /  DBili  x   /  AST  76<H>  /  ALT  45  /  AlkPhos  148<H>  11-20    COVID-19 PCR: NotDetec. (11.17.20 @ 08:05)  Specimen Source: .Blood Blood-Peripheral (11.19.20 @ 10:19)  Culture Results: No growth to date. (11.19.20 @ 10:19)    IMAGING:  EXAM:  XR CHEST PORTABLE URGENT 1V                        PROCEDURE DATE:  11/19/2020   INTERPRETATION:  Follow-up.  AP chest. Prior 11/11/2020.  Right internal jugular line has been removed.  There is interval improvement in bilateral airspace disease. Persistent bibasilar consolidation with small effusions. No pneumothorax. Distended bowel visualized upper abdomen similar to prior.  Impression: Improvement in bilateral airspace disease with persistent bibasilar consolidation and small effusions.    ASSESSMENT: 65y Female, lives with daughter, ambulates with walker with PMH of recurrent SBO's, s/p exp lap, SB resection in 2015, ex lap, ALBINA in 2018, DVT, PE, on Xarelto, IVC filter, chronic leg swelling, and anasarca, came in to the ED due to hypotension during office visit. Continue to spike temps.    PROBLEM : Fever probably due to candida in sacral wound culture  PLAN:   1. Cont Daptomycin IVPB 350 jaycob GRAM(s) IV Intermittent every 24 hours  2. Cont ascorbic acid 500 jaycob GRAM(s) Oral daily  3. Cont dextrose 5% 1000 milliliter(s) (50 mL/Hr) IV Continuous   4. Cont enoxaparin Injectable 50 jaycob GRAM(s) Subcutaneous daily  5. Cont multivitamin 1 Tablet(s) Oral daily  6. Cont zinc sulfate 220 jaycob GRAM(s) Oral daily  7. Cont acetaminophen   Tablet  650 jaycob GRAM(s) Oral every 6 hours PRN Temp greater or equal to 38C (100.4F)    FOLLOW UP / RESULT: Culture results, temp curve

## 2020-11-20 NOTE — PROGRESS NOTE ADULT - PROBLEM SELECTOR PLAN 1
Secondary to bacteremia . Bacteroides fragilis per BC on 10/13  Sputum positive for MRSA and Stenotrophomas  Aspiration pneumonia vs HCAP   Repeat BC 10/16 and 11/19   NGTD    Infected sacral ulcer- s/p debridement and culture grew Enterococcus and Candida, sensitivity is pending  Febrile overnight, leukocytosis stable  c/w Dapto and Fluconazole  Remove velazquez . Apply permafit  f/u repeat BC 11/19  Monitor CK weekly while on Dapto  ID Dr. Patricio following         ` Secondary to bacteremia . Bacteroides fragilis per BC on 10/13  Sputum positive for MRSA and Stenotrophomas  Aspiration pneumonia vs HCAP   Repeat BC 10/16 and 11/19   NGTD    Infected sacral ulcer- s/p debridement and culture grew Enterococcus and Candida, sensitivity is pending  Febrile overnight, leukocytosis uptrending  c/w Dapto and Fluconazole  velazquez  discontinued 11/19.   Permafit as needed.    BC 11/19 NGTD   Monitor CK weekly while on Dapto  ID Dr. Patricio following         `

## 2020-11-20 NOTE — PROGRESS NOTE ADULT - PROBLEM SELECTOR PLAN 4
Multifactorial: Multiple organ failure, Elevated LFTS, Elevated amnionia level and baseline dementia.  Maintain fall and safety precautions. Multifactorial: Multiple organ failure, Elevated LFTS, Elevated amnionia level and baseline dementia.  Improving   Elevated NH3 resolved  Lactulose discontinued   Maintain fall and safety precautions.

## 2020-11-20 NOTE — PROGRESS NOTE ADULT - PROBLEM SELECTOR PLAN 2
Echo results above. EF 15-20%. Severe left ventricular dysfunction.  Continue supportive care. Echo results above. EF 15-20%. Severe left ventricular dysfunction.  Pt euvolemic   Lasix decreased to 20mg with hold parameters in setting of poor po intake.   Continue supportive care.

## 2020-11-20 NOTE — PROGRESS NOTE ADULT - PROBLEM SELECTOR PLAN 5
Stage IV  S/P sacral wound debridement  No active infection per Surgery `  Continue fluconazole as per ID.   c/w Daptomycin , day 4/10. Monitor CK while on Dapto   Continue wound care as ordered.  Turn every 2 hours.  OOB daily.

## 2020-11-20 NOTE — PROGRESS NOTE ADULT - PROBLEM SELECTOR PLAN 2
Echo results above. EF 15-20%. Severe left ventricular dysfunction.  Pt euvolemic   Lasix decreased to 20mg with hold parameters in setting of poor po intake.   Continue supportive care.

## 2020-11-20 NOTE — PROGRESS NOTE ADULT - PROBLEM SELECTOR PLAN 9
DVT and GI prophylaxis  Continue rifaximin  Dapto for total of 10 days  Discharge planning likely to [facility] when medically optimized

## 2020-11-20 NOTE — PROGRESS NOTE ADULT - PROBLEM SELECTOR PLAN 5
S/P sacral wound debridement  Continue fluconazole as per ID.   c/w Daptomycin , day 3/10. Monitor CK while on Dapto   Continue wound care as ordered.  Turn every 2 hours.  OOB daily. Stage IV  S/P sacral wound debridement  No active infection per Surgery `  Continue fluconazole as per ID.   c/w Daptomycin , day 4/10. Monitor CK while on Dapto   Continue wound care as ordered.  Turn every 2 hours.  OOB daily.

## 2020-11-21 NOTE — PROGRESS NOTE ADULT - SUBJECTIVE AND OBJECTIVE BOX
Patient was seen and examined  Patient is a 65y old  Female who presents with a chief complaint of Hypotension (2020 18:22)      INTERVAL HPI/OVERNIGHT EVENTS:  T(C): 36.9 (20 @ 05:10), Max: 38.3 (20 @ 00:15)  HR: 89 (20 @ 05:10) (88 - 106)  BP: 116/71 (20 @ 05:10) (116/71 - 126/74)  RR: 16 (20 @ 05:10) (16 - 18)  SpO2: 100% (20 @ 05:10) (100% - 100%)  Wt(kg): --  I&O's Summary    2020 07:01  -  2020 07:00  --------------------------------------------------------  IN: 600 mL / OUT: 0 mL / NET: 600 mL        LABS:                        7.5    11.27 )-----------( 245      ( 2020 06:27 )             24.6     21    145  |  112<H>  |  27<H>  ----------------------------<  86  3.6   |  29  |  1.23    Ca    7.4<L>      2020 06:27  Phos  2.7       Mg     1.6         TPro  6.1  /  Alb  2.2<L>  /  TBili  2.5<H>  /  DBili  x   /  AST  65<H>  /  ALT  42  /  AlkPhos  149<H>  21      Urinalysis Basic - ( 2020 04:12 )    Color: Yellow / Appearance: Clear / S.005 / pH: x  Gluc: x / Ketone: Trace  / Bili: Negative / Urobili: Negative   Blood: x / Protein: 30 mg/dL / Nitrite: Negative   Leuk Esterase: Moderate / RBC: 5-10 /HPF / WBC 11-25 /HPF   Sq Epi: x / Non Sq Epi: Few /HPF / Bacteria: Moderate /HPF      CAPILLARY BLOOD GLUCOSE      POCT Blood Glucose.: 91 mg/dL (2020 07:59)  POCT Blood Glucose.: 96 mg/dL (2020 06:30)  POCT Blood Glucose.: 115 mg/dL (2020 02:12)          Urinalysis Basic - ( 2020 04:12 )    Color: Yellow / Appearance: Clear / S.005 / pH: x  Gluc: x / Ketone: Trace  / Bili: Negative / Urobili: Negative   Blood: x / Protein: 30 mg/dL / Nitrite: Negative   Leuk Esterase: Moderate / RBC: 5-10 /HPF / WBC 11-25 /HPF   Sq Epi: x / Non Sq Epi: Few /HPF / Bacteria: Moderate /HPF        MEDICATIONS  (STANDING):  ascorbic acid 500 milliGRAM(s) Oral daily  cefepime   IVPB      cefepime   IVPB 1000 milliGRAM(s) IV Intermittent every 24 hours  DAPTOmycin IVPB      DAPTOmycin IVPB 350 milliGRAM(s) IV Intermittent every 24 hours  dextrose 5% 1000 milliLiter(s) (50 mL/Hr) IV Continuous <Continuous>  dextrose 5% + sodium chloride 0.45% with potassium chloride 10 mEq/L 100 milliLiter(s) (50 mL/Hr) IV Continuous <Continuous>  dextrose 5% + sodium chloride 0.45% with potassium chloride 10 mEq/L 100 milliLiter(s) (50 mL/Hr) IV Continuous <Continuous>  enoxaparin Injectable 50 milliGRAM(s) SubCutaneous daily  fluconAZOLE IVPB 200 milliGRAM(s) IV Intermittent every 24 hours  fluconAZOLE IVPB      furosemide    Tablet 20 milliGRAM(s) Oral daily  multivitamin 1 Tablet(s) Oral daily  pantoprazole   Suspension 40 milliGRAM(s) Enteral Tube daily  rifAXIMin 550 milliGRAM(s) Oral two times a day  zinc sulfate 220 milliGRAM(s) Oral daily    MEDICATIONS  (PRN):  acetaminophen   Tablet .. 650 milliGRAM(s) Oral every 6 hours PRN Temp greater or equal to 38C (100.4F)      RADIOLOGY & ADDITIONAL TESTS:    Imaging Personally Reviewed:  [ ] YES  [ ] NO    REVIEW OF SYSTEMS:  CONSTITUTIONAL: No fever, weight loss, or fatigue  EYES: No eye pain, visual disturbances, or discharge  ENMT:  No difficulty hearing, tinnitus, vertigo; No sinus or throat pain  NECK: No pain or stiffness  BREASTS: No pain, masses, or nipple discharge  RESPIRATORY: No cough, wheezing, chills or hemoptysis; No shortness of breath  CARDIOVASCULAR: No chest pain, palpitations, dizziness, or leg swelling  GASTROINTESTINAL: No abdominal or epigastric pain. No nausea, vomiting, or hematemesis; No diarrhea or constipation. No melena or hematochezia.  GENITOURINARY: No dysuria, frequency, hematuria, or incontinence  NEUROLOGICAL: No headaches, memory loss, loss of strength, numbness, or tremors  SKIN: No itching, burning, rashes, or lesions   LYMPH NODES: No enlarged glands  ENDOCRINE: No heat or cold intolerance; No hair loss  MUSCULOSKELETAL: No joint pain or swelling; No muscle, back, or extremity pain  PSYCHIATRIC: No depression, anxiety, mood swings, or difficulty sleeping  HEME/LYMPH: No easy bruising, or bleeding gums  ALLERY AND IMMUNOLOGIC: No hives or eczema      Consultant(s) Notes Reviewed:  [ ] YES  [ ] NO    PHYSICAL EXAM:  GENERAL: NAD, well-groomed, well-developed  HEAD:  Atraumatic, Normocephalic  EYES: EOMI, PERRLA, conjunctiva and sclera clear  ENMT: No tonsillar erythema, exudates, or enlargement; Moist mucous membranes, Good dentition, No lesions  NECK: Supple, No JVD, Normal thyroid  NERVOUS SYSTEM:  Alert & Oriented X3, Good concentration; Motor Strength 5/5 B/L upper and lower extremities; DTRs 2+ intact and symmetric  CHEST/LUNG: Clear to percussion bilaterally; No rales, rhonchi, wheezing, or rubs  HEART: Regular rate and rhythm; No murmurs, rubs, or gallops  ABDOMEN: Soft, Nontender, Nondistended; Bowel sounds present  EXTREMITIES:  2+ Peripheral Pulses, No clubbing, cyanosis, or edema  LYMPH: No lymphadenopathy noted  skin decubeti sacral     Care Discussed with Consultants/Other Providers [ x] YES  [ ] NO

## 2020-11-21 NOTE — PROGRESS NOTE ADULT - SUBJECTIVE AND OBJECTIVE BOX
Patient is seen and examined at the bed side, is afebrile now, spiking fever over nights. The blood cultures as of 11/19  remains negative. The Urine culture has no growth.       REVIEW OF SYSTEMS: All other review systems are negative      ALLERGIES: No Known Allergies      Vital Signs Last 24 Hrs  T(C): 37.9 (21 Nov 2020 15:57), Max: 38.3 (21 Nov 2020 00:15)  T(F): 100.3 (21 Nov 2020 15:57), Max: 101 (21 Nov 2020 00:15)  HR: 96 (21 Nov 2020 12:37) (89 - 106)  BP: 120/73 (21 Nov 2020 12:37) (116/71 - 124/72)  BP(mean): --  RR: 18 (21 Nov 2020 12:37) (16 - 18)  SpO2: 100% (21 Nov 2020 12:37) (100% - 100%)      PHYSICAL EXAM:  GENERAL: Not in distress, on oxygen via NC  CHEST/LUNG: Not using accessory muscles   HEART: s1 and s2 present  ABDOMEN:  Nontender and  Nondistended  EXTREMITIES: B/L UE edematous improved  CNS: Awake and Alert         LABS:                        7.5    11.27 )-----------( 245      ( 21 Nov 2020 06:27 )             24.6                         9.4    13.14 )-----------( 149      ( 14 Nov 2020 07:58 )             28.7         11-21    145  |  112<H>  |  27<H>  ----------------------------<  86  3.6   |  29  |  1.23    Ca    7.4<L>      21 Nov 2020 06:27  Phos  2.7     11-21  Mg     1.6     11-21    TPro  6.1  /  Alb  2.2<L>  /  TBili  2.5<H>  /  DBili  x   /  AST  65<H>  /  ALT  42  /  AlkPhos  149<H>  11-21 11-20 11-19    148<H>  |  110<H>  |  34<H>  ----------------------------<  79  3.1<L>   |  27  |  1.50<H>    Ca    8.5      19 Nov 2020 06:44  Phos  2.6     11-19  Mg     1.8     11-19    TPro  6.5  /  Alb  2.5<L>  /  TBili  2.8<H>  /  DBili  x   /  AST  89<H>  /  ALT  50  /  AlkPhos  145<H>  11-19 11-06    138  |  104  |  37<H>  ----------------------------<  81  3.9   |  25  |  2.37<H>    Ca    8.1<L>      06 Nov 2020 06:20  Phos  3.4     11-06  Mg     2.0     11-06    TPro  5.1<L>  /  Alb  2.8<L>  /  TBili  9.2<H>  /  DBili  x   /  AST  233<H>  /  ALT  99<H>  /  AlkPhos  96  11-06      Vancomycin Level, Trough (11.07.20 @ 21:49)   Vancomycin Level, Trough: 16.1:     Vancomycin Level, Trough (11.07.20 @ 07:08)   Vancomycin Level, Trough: 19.5    vanVancomycin Level, Trough (11.06.20 @ 06:20)   Vancomycin Level, Trough: 19.9:         MEDICATIONS  (STANDING):    ascorbic acid 500 milliGRAM(s) Oral daily  cefepime   IVPB      cefepime   IVPB 1000 milliGRAM(s) IV Intermittent every 24 hours  DAPTOmycin IVPB      DAPTOmycin IVPB 350 milliGRAM(s) IV Intermittent every 24 hours  enoxaparin Injectable 50 milliGRAM(s) SubCutaneous daily  fluconAZOLE IVPB 200 milliGRAM(s) IV Intermittent every 24 hours  fluconAZOLE IVPB      furosemide    Tablet 20 milliGRAM(s) Oral daily  multivitamin 1 Tablet(s) Oral daily  pantoprazole   Suspension 40 milliGRAM(s) Enteral Tube daily  rifAXIMin 550 milliGRAM(s) Oral two times a day  zinc sulfate 220 milliGRAM(s) Oral daily      RADIOLOGY & ADDITIONAL TESTS:      11/19/20 : Xray Chest 1 View- PORTABLE-Urgent (Xray Chest 1 View- PORTABLE-Urgent .) (11.19.20 @ 23:53): Improvement in bilateral airspace disease with persistent bibasilar consolidation and small effusions.    11/4/20 : Xray Chest 1 View- PORTABLE-Routine (Xray Chest 1 View- PORTABLE-Routine in AM.) (11.04.20 @ 10:59) Reexpansion of the left lung with residual left pleural effusion and/or infiltrate.  Right pulmonary edema unchanged.  Tubes and catheters in satisfactory position.      10/25/20: Xray Chest 1 View- PORTABLE-Routine (Xray Chest 1 View- PORTABLE-Routine in AM.) (10.25.20 @ 09:21) Frontal expiratory view of the chest shows the heart to be similar in size. Endotracheal tube, right jugular line and feeding tube remain present. The lungs show similar left upper lobe infiltrate with progression of right perihilar infiltrate. Pleural effusions are similar. There is no evidence of pneumothorax.    10/21/20 : CT Abdomen and Pelvis w/ Oral Cont and w/ IV Cont (10.21.20 @ 15:59) Mural thickening of the left colon and rectum. Liquid stool in the colon. Apparent segmental mural thickening of the mid to distal small bowel and the proximal duodenum. Findings may represent nonspecific enterocolitis, noninfectious inflammatory bowel disease, or ischemic bowel. Clinical correlation is recommended. The celiac axis artery, SMA, and KINA are patent without stenosis.    Dilatation of the mid small bowel is again noted. Oral contrast has reached the terminal ileum. Findings may represent small bowel obstruction, or ileus related to nonspecific enterocolitis.   Cholelithiasis. Moderate to large ascites in the abdomen, increased since the previous examination. 5 mm nonspecific noncalcified left upper lobe lung nodule; if the patient's is in the high risk category (i.e. smoker), follow-up chest CT may be pursued in 12 months to ensure stability. Combination of atelectasis and consolidation in the left lower lobe.  Possible 1.0 cm hypodense lesion in the left lobe of the thyroid. Thyroid ultrasound may be pursued for further evaluation.   Mild bilateral pleural effusions.  Aging determinate compression fracture at T5 vertebra.          MICROBIOLOGY DATA:    Urine Microscopic-Add On (NC) (11.20.20 @ 04:12)   Red Blood Cell - Urine: 5-10 /HPF   White Blood Cell - Urine: 11-25 /HPF   Calcium Oxalate Crystals: Few /HPF   Bacteria: Moderate /HPF   Comment - Urine: few amorphous urates   Epithelial Cells: Few /HPF     Culture - Blood (11.19.20 @ 10:19)   Specimen Source: .Blood Blood-Peripheral   Culture Results: No growth to date.     Culture - Blood (11.19.20 @ 10:19)   Specimen Source: .Blood Blood-Peripheral   Culture Results: No growth to date.     Culture - Surgical Swab (11.12.20 @ 05:01)   Specimen Source: .Surgical Swab Sacral decub   Culture Results:  Few Enterococcus faecium Susceptibility to follow.   Rare Candida albicans "Susceptibilities not performed"     Culture - Sputum . (10.26.20 @ 00:26)   - Ampicillin/Sulbactam: R <=8/4   - Cefazolin: R >16   - Ceftazidime: I 16   - Clindamycin: R >4   - Erythromycin: R >4   - Gentamicin: S <=1 Should not be used as monotherapy   - Levofloxacin: S <=0.5   - Linezolid: S 2   - Oxacillin: R >2   - Penicillin: R >8   - RIF- Rifampin: S <=1 Should not be used as monotherapy   - Tetra/Doxy: S <=1   - Trimethoprim/Sulfamethoxazole: S <=0.5/9.5   - Trimethoprim/Sulfamethoxazole: S <=0.5/9.5   - Vancomycin: S 1     MRSA/MSSA PCR (10.21.20 @ 09:41)   MRSA PCR Result.: Detected:       Culture - Blood (10.20.20 @ 22:09)   Specimen Source: .Blood Blood   Culture Results:  No growth to date.     Culture - Blood (10.20.20 @ 22:09)   Specimen Source: .Blood Blood   Culture Results:   No growth to date.     Culture - Blood in AM (10.16.20 @ 10:13)   Specimen Source: .Blood Blood-Peripheral   Culture Results: No growth to date.     Culture - Blood in AM (10.16.20 @ 10:13)   Specimen Source: .Blood Blood-Peripheral   Culture Results: No growth to date.     Culture - Blood (10.13.20 @ 22:23)   Growth in anaerobic bottle: Gram Negative Rods   Specimen Source: .Blood Blood-Peripheral   Organism: Blood Culture PCR   Culture Results: Growth in anaerobic bottle: Bacteroides fragilis   "Susceptibilities not performed"     Culture - Blood (10.13.20 @ 22:23)   Specimen Source: .Blood Blood-Peripheral   Culture Results:   No growth to date.

## 2020-11-21 NOTE — PROGRESS NOTE ADULT - NUTRITIONAL ASSESSMENT
This patient has been assessed with a concern for Malnutrition and has been determined to have a diagnosis/diagnoses of Severe protein-calorie malnutrition.    Diet, Dysphagia 1 Pureed-Nectar Consistency Fluid:   Supplement Feeding Modality:  Oral  Two Papi HN Cans or Servings Per Day:  3       Frequency:  Three Times a day  Ensure Pudding Cans or Servings Per Day:  2       Frequency:  Two Times a day (11-13-20 @ 13:46) [Active]

## 2020-11-21 NOTE — PROGRESS NOTE ADULT - ASSESSMENT
Patient is a 65y old  Female from home, lives with daughter, ambulates with walker with PMH of recurrent SBO's, s/p exp lap, SB resection in 2015, ex lap, ALBINA in 2018, DVT, PE, on Xarelto, IVC filter, chronic leg swelling, and anasarca, Now send in to the ER by her PCP, Dr. Ross, for evaluation of  generalized weakness and hypotension BP of 80/40 during office visit. On admission, she found to have no fever and BP was in lower normal range and no Leukocytosis. The CXR is clear and CT abd/pelvis consistent with Ileus. The ID consult requested to assist with evaluation of infectious etiology of episode of hypotension. Found to have Bacteroides bacteremia and now developed septic shock on Cefepime and Flagyl. Hence transferred to ICU. 10/20/20.    # Hypotension  at office- BP of 80/40 - resolved   #  Bacteroides fragilis Bacteremia - 10/13/20 - ? source most likely GI- Repeat Blood Cxs have NGTD 10/16/20  # Ileus  - h/o recurrent SBO and s/p EXp. LAp  # COVID 19 negative   # Septic shock ( Hypothermia + hypotensive)- transferred to ICU since requiring pressor- source GI, The CT abd/pelvis shows nonspecific enterocolitis, noninfectious inflammatory bowel disease, or ischemic bowel, along with ileus.   # Pneumonia- HCAP vs Aspiration - on CXR 10/24 and CT chest 10/21- sputum Cx grew Methicillin resistant Staphylococcus aureus  and Stenotrophomonas maltophilia.  # S/p extubated 11/9/20  # Infected sacral ulcer- s/p debridement and culture grew Enterococcus and Candida, sensitivity is pending  # Fever- 11/8 and 11/19- BCX NGTD 11/19 - The CXR shows Improvement in bilateral airspace disease with persistent bibasilar consolidation. She is s/p removal of velazquez catheter and passed TOV.      would recommend:    1. Monitor Temp. and c/w supportive care  2. Please obtain Imaging of pelvis to rule out osteomyelitis   3. Continue Daptomycin to cover VRE  and  Fluconazole to cover Candida in sacral wound culture  4. Monitor kidney function, is improving  and discontinue Cefepime  5. Aspiration precaution  6. Continue Sacral Wound care  and Frequent Repositioning   7. Monitor CPK level while on Daptomycin    d/w  Nursing staff, Family at the bed side and covering NP, TITUS    Attending Attestation:    Spent more than 45 minutes on total encounter, more than 50 % of the visit was spent counseling and/or coordinating care by the Attending physician.

## 2020-11-21 NOTE — PROGRESS NOTE ADULT - PROBLEM SELECTOR PLAN 2
EF 15-20%. Severe left ventricular dysfunction.  Pt euvolemic   Lasix po 20mg with hold parameters in setting of poor po intake.   Continue supportive care.

## 2020-11-21 NOTE — PROGRESS NOTE ADULT - PROBLEM SELECTOR PLAN 5
Stage IV  S/P sacral wound debridement  No active infection per Surgery `  gradual uptrending of WBC  xray pelvis ordered: assess for OM given sacral wound  Continue fluconazole as per ID.   con't Daptomycin (ordered 11/17) Monitor CK while on Dapto   Continue wound care as ordered.  Turn every 2 hours.  OOB daily.

## 2020-11-21 NOTE — PROGRESS NOTE ADULT - PROBLEM SELECTOR PLAN 1
Secondary to bacteremia . Bacteroides fragilis per BC on 10/13  Sputum positive for MRSA and Stenotrophomas  Aspiration pneumonia vs HCAP   Repeat BC 10/16 and 11/19   NGTD    Infected sacral ulcer- s/p debridement and culture grew Enterococcus and Candida, sensitivity is pending  WBC 11.27 (peaked at 13.14 on 11/14)  on Dapto and Fluconazole  velazquez  discontinued 11/19.   Permafit as needed.    BC 11/19 NGTD   Monitor CK weekly while on Dapto  ID Dr. Patricio following         `

## 2020-11-21 NOTE — PROGRESS NOTE ADULT - ASSESSMENT
65y Female from home, lives with daughter, ambulates with walker with PMH of recurrent SBO's, s/p exp lap, SB resection in 2015, ex lap, ALBINA in 2018, DVT, PE, on Xarelto, IVC filter, chronic leg swelling, and anasarca, came in to the ED due to hypotension during office visit. Patient was found to be bacteremic with bacteroids fragilis. Admitted in ICU due to hypotension and hypothermia. patient completed course of antibiotics . BCx X2 negative. Ammonia level elevated and patient refused NGT placement, mental status deteriorated and patient desaturated to high 70%, patient got intubated on 10/24 . Sputum was positive for MRSA and Stenotrophomonas. patient started on vancomycin and Levaquin. patient was given lactulose for high ammonia level. kidney function and liver function started to deteriorate and patient was leaning toward multi organ failure . high dose of Lasix started and patient was aggressively diuresed. Kidney function improved . Ammonia level dropped to less than 10.  Mental status improved and patient extubated on 11/3/2020. Patient was seen by wound specialist and decubitus ulcer debrided. patient had a drop in h/h s/p debridement . received 1 unit PRBC .   11/12  Transferred from ICU to SCU  `11/17  Midodrine d/c secondary to daptomycin being started.  11/18 Febrile, Tmax 101.5. Repeat BC sent.   11/19 Discontinue velazquez.   11/20 febrile overnight.   REPLACE K     DW DAUGHTER IN LENGTH

## 2020-11-21 NOTE — PROGRESS NOTE ADULT - SUBJECTIVE AND OBJECTIVE BOX
MARIBETH PADILLA    SCU progress note: chart reviewed, pt seen, d/w rounding attending    INTERVAL HPI/OVERNIGHT EVENTS: ***    DNR [x ]   TRIAL OF INTUBATION, trial feeding tube/ trial of IV fluids/abx    Covid - 19 PCR: Negative     The 4Ms    What Matters Most: see GOC  Age appropriate Medications/Screen for High Risk Medication: Yes  Mentation: see CAM below  Mobility: defer to physical exam    The Confusion Assessment Method (CAM) Diagnostic Algorithm     1: Acute Onset or Fluctuating Course  - Is there evidence of an acute change in mental status from the patient’s baseline? Did the (abnormal) behavior  fluctuate during the day, that is, tend to come and go, or increase and decrease in severity?  [ ] YES [x ] NO     2: Inattention  - Did the patient have difficulty focusing attention, being easily distractible, or having difficulty keeping track of what was being said?  [ ] YES [x ] NO     3: Disorganized thinking  -Was the patient’s thinking disorganized or incoherent, such as rambling or irrelevant conversation, unclear or illogical flow of ideas, or unpredictable switching from subject to subject?  [ ] YES [x ] NO    4: Altered Level of consciousness?  [ ] YES [x ] NO    The diagnosis of delirium by CAM requires the presence of features 1 and 2 and either 3 or 4.    PRESSORS: [ ] YES [x ] NO  cefepime   IVPB      cefepime   IVPB 1000 milliGRAM(s) IV Intermittent every 24 hours  DAPTOmycin IVPB      DAPTOmycin IVPB 350 milliGRAM(s) IV Intermittent every 24 hours  fluconAZOLE IVPB 200 milliGRAM(s) IV Intermittent every 24 hours  fluconAZOLE IVPB      rifAXIMin 550 milliGRAM(s) Oral two times a day    Cardiovascular:  Heart Failure  Acute   Acute on Chronic  Chronic       furosemide    Tablet 20 milliGRAM(s) Oral daily    Pulmonary:    Hematalogic:  enoxaparin Injectable 50 milliGRAM(s) SubCutaneous daily    Other:  acetaminophen   Tablet .. 650 milliGRAM(s) Oral every 6 hours PRN  ascorbic acid 500 milliGRAM(s) Oral daily  dextrose 5% 1000 milliLiter(s) IV Continuous <Continuous>  dextrose 5% + sodium chloride 0.45% with potassium chloride 10 mEq/L 100 milliLiter(s) IV Continuous <Continuous>  dextrose 5% + sodium chloride 0.45% with potassium chloride 10 mEq/L 100 milliLiter(s) IV Continuous <Continuous>  multivitamin 1 Tablet(s) Oral daily  pantoprazole   Suspension 40 milliGRAM(s) Enteral Tube daily  zinc sulfate 220 milliGRAM(s) Oral daily    acetaminophen   Tablet .. 650 milliGRAM(s) Oral every 6 hours PRN  ascorbic acid 500 milliGRAM(s) Oral daily  cefepime   IVPB      cefepime   IVPB 1000 milliGRAM(s) IV Intermittent every 24 hours  DAPTOmycin IVPB      DAPTOmycin IVPB 350 milliGRAM(s) IV Intermittent every 24 hours  dextrose 5% 1000 milliLiter(s) IV Continuous <Continuous>  dextrose 5% + sodium chloride 0.45% with potassium chloride 10 mEq/L 100 milliLiter(s) IV Continuous <Continuous>  dextrose 5% + sodium chloride 0.45% with potassium chloride 10 mEq/L 100 milliLiter(s) IV Continuous <Continuous>  enoxaparin Injectable 50 milliGRAM(s) SubCutaneous daily  fluconAZOLE IVPB 200 milliGRAM(s) IV Intermittent every 24 hours  fluconAZOLE IVPB      furosemide    Tablet 20 milliGRAM(s) Oral daily  multivitamin 1 Tablet(s) Oral daily  pantoprazole   Suspension 40 milliGRAM(s) Enteral Tube daily  rifAXIMin 550 milliGRAM(s) Oral two times a day  zinc sulfate 220 milliGRAM(s) Oral daily    Drug Dosing Weight  Height (cm): 167.6 (21 Oct 2020 02:26)  Weight (kg): 56.2 (21 Oct 2020 02:26)  BMI (kg/m2): 20 (21 Oct 2020 02:26)  BSA (m2): 1.63 (21 Oct 2020 02:26)    CENTRAL LINE: [ ] YES [ ] NO  LOCATION:   DATE INSERTED:  REMOVE: [ ] YES [ ] NO  EXPLAIN:    TREJO: [ ] YES [x ] NO    DATE INSERTED:  REMOVE:  [ ] YES [ ] NO  EXPLAIN:    PAST MEDICAL & SURGICAL HISTORY:  Anasarca    Pulmonary embolism    DVT (deep venous thrombosis)    SBO (small bowel obstruction)    H/O exploratory laparotomy       @ 07:01  -   @ 07:00  --------------------------------------------------------  IN: 600 mL / OUT: 0 mL / NET: 600 mL      PHYSICAL EXAM:  GENERAL: cachectic ,  NAD,  HEAD:  Atraumatic, Normocephalic  EYES: pupils equal, round; nonicteric    ENMT: No exudates, moist mucous membranes,  No lesions  NECK: supple  NERVOUS SYSTEM:  Alert & Oriented X3, Follows simple commands, Motor Strength 5/5 BUE, 4/5 BLE  CHEST/LUNG:Effort normal.  Clear bilaterally; No rales, rhonchi, wheezing, or rubs  HEART: Regular rate and rhythm; No murmurs, rubs, or gallops  ABDOMEN: Soft, Nontender, Nondistended; Bowel sounds present  EXTREMITIES:  2+ Peripheral Pulses, No clubbing, cyanosis, or edema  LYMPH: No lymphadenopathy noted  SKIN: Unstageable sacral ulcer       LABS:  CBC Full  -  ( 2020 06:27 )  WBC Count : 11.27 K/uL  RBC Count : 2.61 M/uL  Hemoglobin : 7.5 g/dL  Hematocrit : 24.6 %  Platelet Count - Automated : 245 K/uL  Mean Cell Volume : 94.3 fl  Mean Cell Hemoglobin : 28.7 pg  Mean Cell Hemoglobin Concentration : 30.5 gm/dL  Auto Neutrophil # : x  Auto Lymphocyte # : x  Auto Monocyte # : x  Auto Eosinophil # : x  Auto Basophil # : x  Auto Neutrophil % : x  Auto Lymphocyte % : x  Auto Monocyte % : x  Auto Eosinophil % : x  Auto Basophil % : x        145  |  112<H>  |  27<H>  ----------------------------<  86  3.6   |  29  |  1.23    Ca    7.4<L>      2020 06:27  Phos  2.7       Mg     1.6         TPro  6.1  /  Alb  2.2<L>  /  TBili  2.5<H>  /  DBili  x   /  AST  65<H>  /  ALT  42  /  AlkPhos  149<H>      Urinalysis Basic - ( 2020 04:12 )    Color: Yellow / Appearance: Clear / S.005 / pH: x  Gluc: x / Ketone: Trace  / Bili: Negative / Urobili: Negative   Blood: x / Protein: 30 mg/dL / Nitrite: Negative   Leuk Esterase: Moderate / RBC: 5-10 /HPF / WBC 11-25 /HPF   Sq Epi: x / Non Sq Epi: Few /HPF / Bacteria: Moderate /HPF    [x ]  DVT Prophylaxis    RADIOLOGY & ADDITIONAL STUDIES:    < from: Xray Chest 1 View- PORTABLE-Urgent (Xray Chest 1 View- PORTABLE-Urgent .) (20 @ 23:53) >    AP chest. Prior 2020.    Right internal jugular line has been removed.  There is interval improvement in bilateral airspace disease. Persistent bibasilar consolidation with small effusions. No pneumothorax. Distended bowel visualized upper abdomen similar to prior.    Impression: Improvement in bilateral airspace disease with persistent bibasilar consolidation and small effusions.    < end of copied text >      Goals of Care Discussion   - see consult note from palliative team 10/23 and subsequent progress notes

## 2020-11-22 NOTE — CHART NOTE - NSCHARTNOTEFT_GEN_A_CORE
EVENT: frequent loose BM    HPI:   65y Female from home, lives with daughter, ambulates with walker with PMH of recurrent SBO's, s/p exp lap, SB resection in 2015, ex lap, ALBINA in 2018, DVT, PE, on Xarelto, IVC filter, chronic leg swelling, and anasarca, presented to the ED after being sent by PCP Dr. Ross for generalized weakness and hypotension during office visit. Patient was seen by PCP for weakness and chills earlier today and was sent to the ED after she was noted to be hypotensive with BP 80/40. Patient denies fever, SOB, palpitations, changes to her bowel or urinary habits, abdominal pain, urinary symptoms or any other acute complaints.  11/22/2020 - Notified by nurse regarding patient with frequent loose BM, with a odor that might related to c-diff. Patient currently on antibiotic therapy for Bacteroides fragilis Bacteremia and  Infected sacral ulcer. Patient not on stool softener or laxative.     OBJECTIVE:  Vital Signs Last 24 Hrs  T(C): 36.9 (21 Nov 2020 20:01), Max: 37.9 (21 Nov 2020 15:57)  T(F): 98.4 (21 Nov 2020 20:01), Max: 100.3 (21 Nov 2020 15:57)  HR: 101 (21 Nov 2020 20:01) (89 - 101)  BP: 112/64 (21 Nov 2020 20:01) (112/64 - 120/73)  BP(mean): --  RR: 18 (21 Nov 2020 20:01) (16 - 18)  SpO2: 100% (21 Nov 2020 20:01) (100% - 100%)    LABS:                        7.5    11.27 )-----------( 245      ( 21 Nov 2020 06:27 )             24.6   CARDIAC MARKERS ( 20 Nov 2020 06:47 )  x     / x     / 71 U/L / x     / x        11-21    145  |  112<H>  |  27<H>  ----------------------------<  86  3.6   |  29  |  1.23    Ca    7.4<L>      21 Nov 2020 06:27  Phos  2.7     11-21  Mg     1.6     11-21    TPro  6.1  /  Alb  2.2<L>  /  TBili  2.5<H>  /  DBili  x   /  AST  65<H>  /  ALT  42  /  AlkPhos  149<H>  11-21          PLAN: Diarrhea - frequent loose BMs  Clostridium difficile Toxin by PCR  contact precautions

## 2020-11-22 NOTE — PROGRESS NOTE ADULT - SUBJECTIVE AND OBJECTIVE BOX
Patient is seen and examined at the bed side, is afebrile today.  The kidney function has improved significantly.       REVIEW OF SYSTEMS: All other review systems are negative      ALLERGIES: No Known Allergies      Vital Signs Last 24 Hrs  T(C): 36.9 (22 Nov 2020 15:30), Max: 37.4 (21 Nov 2020 18:36)  T(F): 98.4 (22 Nov 2020 15:30), Max: 99.4 (21 Nov 2020 18:36)  HR: 67 (22 Nov 2020 15:30) (67 - 106)  BP: 96/58 (22 Nov 2020 15:30) (96/58 - 126/67)  BP(mean): --  RR: 16 (22 Nov 2020 15:30) (16 - 18)  SpO2: 100% (22 Nov 2020 15:30) (100% - 100%)      PHYSICAL EXAM:  GENERAL: Not in distress, on oxygen via NC  CHEST/LUNG: Not using accessory muscles   HEART: s1 and s2 present  ABDOMEN:  Nontender and  Nondistended  EXTREMITIES: B/L UE edematous improved  CNS: Awake and Alert         LABS:                                   6.4    11.57 )-----------( 243      ( 22 Nov 2020 09:20 )             20.5                         9.4    13.14 )-----------( 149      ( 14 Nov 2020 07:58 )             28.7         11-22    145  |  112<H>  |  25<H>  ----------------------------<  73  3.4<L>   |  27  |  1.19    Ca    7.2<L>      22 Nov 2020 08:14  Phos  2.8     11-22  Mg     1.4     11-22    TPro  5.8<L>  /  Alb  1.9<L>  /  TBili  2.6<H>  /  DBili  x   /  AST  51<H>  /  ALT  35  /  AlkPhos  142<H>  11-22 11-20 11-19    148<H>  |  110<H>  |  34<H>  ----------------------------<  79  3.1<L>   |  27  |  1.50<H>    Ca    8.5      19 Nov 2020 06:44  Phos  2.6     11-19  Mg     1.8     11-19    TPro  6.5  /  Alb  2.5<L>  /  TBili  2.8<H>  /  DBili  x   /  AST  89<H>  /  ALT  50  /  AlkPhos  145<H>  11-19 11-06    138  |  104  |  37<H>  ----------------------------<  81  3.9   |  25  |  2.37<H>    Ca    8.1<L>      06 Nov 2020 06:20  Phos  3.4     11-06  Mg     2.0     11-06    TPro  5.1<L>  /  Alb  2.8<L>  /  TBili  9.2<H>  /  DBili  x   /  AST  233<H>  /  ALT  99<H>  /  AlkPhos  96  11-06      Vancomycin Level, Trough (11.07.20 @ 21:49)   Vancomycin Level, Trough: 16.1:     Vancomycin Level, Trough (11.07.20 @ 07:08)   Vancomycin Level, Trough: 19.5    vanVancomycin Level, Trough (11.06.20 @ 06:20)   Vancomycin Level, Trough: 19.9:         MEDICATIONS  (STANDING):    ascorbic acid 500 milliGRAM(s) Oral daily  cefepime   IVPB      cefepime   IVPB 1000 milliGRAM(s) IV Intermittent every 24 hours  DAPTOmycin IVPB      DAPTOmycin IVPB 350 milliGRAM(s) IV Intermittent every 24 hours  dextrose 5% 1000 milliLiter(s) (50 mL/Hr) IV Continuous <Continuous>  dextrose 5% + sodium chloride 0.45% with potassium chloride 10 mEq/L 100 milliLiter(s) (50 mL/Hr) IV Continuous <Continuous>  dextrose 5% + sodium chloride 0.45% with potassium chloride 10 mEq/L 100 milliLiter(s) (50 mL/Hr) IV Continuous <Continuous>  enoxaparin Injectable 50 milliGRAM(s) SubCutaneous daily  fluconAZOLE IVPB 200 milliGRAM(s) IV Intermittent every 24 hours  fluconAZOLE IVPB      furosemide    Tablet 20 milliGRAM(s) Oral daily  multivitamin 1 Tablet(s) Oral daily  nystatin Powder 1 Application(s) Topical two times a day  pantoprazole   Suspension 40 milliGRAM(s) Enteral Tube daily  rifAXIMin 550 milliGRAM(s) Oral two times a day  zinc sulfate 220 milliGRAM(s) Oral daily        RADIOLOGY & ADDITIONAL TESTS:    11/19/20 : Xray Chest 1 View- PORTABLE-Urgent (Xray Chest 1 View- PORTABLE-Urgent .) (11.19.20 @ 23:53): Improvement in bilateral airspace disease with persistent bibasilar consolidation and small effusions.    11/4/20 : Xray Chest 1 View- PORTABLE-Routine (Xray Chest 1 View- PORTABLE-Routine in AM.) (11.04.20 @ 10:59) Reexpansion of the left lung with residual left pleural effusion and/or infiltrate.  Right pulmonary edema unchanged.  Tubes and catheters in satisfactory position.      10/25/20: Xray Chest 1 View- PORTABLE-Routine (Xray Chest 1 View- PORTABLE-Routine in AM.) (10.25.20 @ 09:21) Frontal expiratory view of the chest shows the heart to be similar in size. Endotracheal tube, right jugular line and feeding tube remain present. The lungs show similar left upper lobe infiltrate with progression of right perihilar infiltrate. Pleural effusions are similar. There is no evidence of pneumothorax.    10/21/20 : CT Abdomen and Pelvis w/ Oral Cont and w/ IV Cont (10.21.20 @ 15:59) Mural thickening of the left colon and rectum. Liquid stool in the colon. Apparent segmental mural thickening of the mid to distal small bowel and the proximal duodenum. Findings may represent nonspecific enterocolitis, noninfectious inflammatory bowel disease, or ischemic bowel. Clinical correlation is recommended. The celiac axis artery, SMA, and KINA are patent without stenosis.    Dilatation of the mid small bowel is again noted. Oral contrast has reached the terminal ileum. Findings may represent small bowel obstruction, or ileus related to nonspecific enterocolitis.   Cholelithiasis. Moderate to large ascites in the abdomen, increased since the previous examination. 5 mm nonspecific noncalcified left upper lobe lung nodule; if the patient's is in the high risk category (i.e. smoker), follow-up chest CT may be pursued in 12 months to ensure stability. Combination of atelectasis and consolidation in the left lower lobe.  Possible 1.0 cm hypodense lesion in the left lobe of the thyroid. Thyroid ultrasound may be pursued for further evaluation.   Mild bilateral pleural effusions.  Aging determinate compression fracture at T5 vertebra.          MICROBIOLOGY DATA:    Urine Microscopic-Add On (NC) (11.20.20 @ 04:12)   Red Blood Cell - Urine: 5-10 /HPF   White Blood Cell - Urine: 11-25 /HPF   Calcium Oxalate Crystals: Few /HPF   Bacteria: Moderate /HPF   Comment - Urine: few amorphous urates   Epithelial Cells: Few /HPF     Culture - Blood (11.19.20 @ 10:19)   Specimen Source: .Blood Blood-Peripheral   Culture Results: No growth to date.     Culture - Blood (11.19.20 @ 10:19)   Specimen Source: .Blood Blood-Peripheral   Culture Results: No growth to date.     Culture - Surgical Swab (11.12.20 @ 05:01)   Specimen Source: .Surgical Swab Sacral decub   Culture Results:  Few Enterococcus faecium Susceptibility to follow.   Rare Candida albicans "Susceptibilities not performed"     Culture - Sputum . (10.26.20 @ 00:26)   - Ampicillin/Sulbactam: R <=8/4   - Cefazolin: R >16   - Ceftazidime: I 16   - Clindamycin: R >4   - Erythromycin: R >4   - Gentamicin: S <=1 Should not be used as monotherapy   - Levofloxacin: S <=0.5   - Linezolid: S 2   - Oxacillin: R >2   - Penicillin: R >8   - RIF- Rifampin: S <=1 Should not be used as monotherapy   - Tetra/Doxy: S <=1   - Trimethoprim/Sulfamethoxazole: S <=0.5/9.5   - Trimethoprim/Sulfamethoxazole: S <=0.5/9.5   - Vancomycin: S 1     MRSA/MSSA PCR (10.21.20 @ 09:41)   MRSA PCR Result.: Detected:       Culture - Blood (10.20.20 @ 22:09)   Specimen Source: .Blood Blood   Culture Results:  No growth to date.     Culture - Blood (10.20.20 @ 22:09)   Specimen Source: .Blood Blood   Culture Results:   No growth to date.     Culture - Blood in AM (10.16.20 @ 10:13)   Specimen Source: .Blood Blood-Peripheral   Culture Results: No growth to date.     Culture - Blood in AM (10.16.20 @ 10:13)   Specimen Source: .Blood Blood-Peripheral   Culture Results: No growth to date.     Culture - Blood (10.13.20 @ 22:23)   Growth in anaerobic bottle: Gram Negative Rods   Specimen Source: .Blood Blood-Peripheral   Organism: Blood Culture PCR   Culture Results: Growth in anaerobic bottle: Bacteroides fragilis   "Susceptibilities not performed"     Culture - Blood (10.13.20 @ 22:23)   Specimen Source: .Blood Blood-Peripheral   Culture Results:   No growth to date.

## 2020-11-22 NOTE — PROGRESS NOTE ADULT - ASSESSMENT
Patient is a 65y old  Female from home, lives with daughter, ambulates with walker with PMH of recurrent SBO's, s/p exp lap, SB resection in 2015, ex lap, ALBINA in 2018, DVT, PE, on Xarelto, IVC filter, chronic leg swelling, and anasarca, Now send in to the ER by her PCP, Dr. Ross, for evaluation of  generalized weakness and hypotension BP of 80/40 during office visit. On admission, she found to have no fever and BP was in lower normal range and no Leukocytosis. The CXR is clear and CT abd/pelvis consistent with Ileus. The ID consult requested to assist with evaluation of infectious etiology of episode of hypotension. Found to have Bacteroides bacteremia and now developed septic shock on Cefepime and Flagyl. Hence transferred to ICU. 10/20/20.    # Hypotension  at office- BP of 80/40 - resolved   #  Bacteroides fragilis Bacteremia - 10/13/20 - ? source most likely GI- Repeat Blood Cxs have NGTD 10/16/20  # Ileus  - h/o recurrent SBO and s/p EXp. LAp  # COVID 19 negative   # Septic shock ( Hypothermia + hypotensive)- transferred to ICU since requiring pressor- source GI, The CT abd/pelvis shows nonspecific enterocolitis, noninfectious inflammatory bowel disease, or ischemic bowel, along with ileus.   # Pneumonia- HCAP vs Aspiration - on CXR 10/24 and CT chest 10/21- sputum Cx grew Methicillin resistant Staphylococcus aureus  and Stenotrophomonas maltophilia.  # S/p extubated 11/9/20  # Infected sacral ulcer- s/p debridement and culture grew Enterococcus and Candida, sensitivity is pending  # Fever- 11/8 and 11/19- BCX NGTD 11/19 - The CXR shows Improvement in bilateral airspace disease with persistent bibasilar consolidation. She is s/p removal of velazquez catheter and passed TOV.      would recommend:    1. Please obtain Imaging of pelvis to rule out osteomyelitis, if negative for OM then need Abx until 11/24/20, otherwise 6 weeks  2. Continue Daptomycin to cover VRE  and  Fluconazole to cover Candida in sacral wound culture  3. Monitor kidney function, is improving  4. Aspiration precaution  5. Continue Sacral Wound care  and Frequent Repositioning   6. Monitor CPK level while on Daptomycin      Attending Attestation:    Spent more than 45 minutes on total encounter, more than 50 % of the visit was spent counseling and/or coordinating care by the Attending physician.

## 2020-11-22 NOTE — PROGRESS NOTE ADULT - SUBJECTIVE AND OBJECTIVE BOX
Patient was seen and examined  Patient is a 65y old  Female who presents with a chief complaint of Hypotension (21 Nov 2020 16:57)      INTERVAL HPI/OVERNIGHT EVENTS:  T(C): 36.6 (11-22-20 @ 05:00), Max: 37.9 (11-21-20 @ 15:57)  HR: 106 (11-22-20 @ 05:00) (96 - 106)  BP: 121/79 (11-22-20 @ 05:00) (112/64 - 121/79)  RR: 17 (11-22-20 @ 05:00) (17 - 18)  SpO2: 100% (11-22-20 @ 05:00) (100% - 100%)  Wt(kg): --  I&O's Summary    20 Nov 2020 07:01  -  21 Nov 2020 07:00  --------------------------------------------------------  IN: 600 mL / OUT: 0 mL / NET: 600 mL    21 Nov 2020 07:01  -  22 Nov 2020 06:40  --------------------------------------------------------  IN: 200 mL / OUT: 0 mL / NET: 200 mL        LABS:                        7.5    11.27 )-----------( 245      ( 21 Nov 2020 06:27 )             24.6     11-21    145  |  112<H>  |  27<H>  ----------------------------<  86  3.6   |  29  |  1.23    Ca    7.4<L>      21 Nov 2020 06:27  Phos  2.7     11-21  Mg     1.6     11-21    TPro  6.1  /  Alb  2.2<L>  /  TBili  2.5<H>  /  DBili  x   /  AST  65<H>  /  ALT  42  /  AlkPhos  149<H>  11-21        CAPILLARY BLOOD GLUCOSE      POCT Blood Glucose.: 91 mg/dL (22 Nov 2020 06:06)  POCT Blood Glucose.: 110 mg/dL (22 Nov 2020 00:01)  POCT Blood Glucose.: 125 mg/dL (21 Nov 2020 17:11)  POCT Blood Glucose.: 101 mg/dL (21 Nov 2020 11:47)  POCT Blood Glucose.: 91 mg/dL (21 Nov 2020 07:59)              MEDICATIONS  (STANDING):  ascorbic acid 500 milliGRAM(s) Oral daily  cefepime   IVPB      cefepime   IVPB 1000 milliGRAM(s) IV Intermittent every 24 hours  DAPTOmycin IVPB      DAPTOmycin IVPB 350 milliGRAM(s) IV Intermittent every 24 hours  dextrose 5% 1000 milliLiter(s) (50 mL/Hr) IV Continuous <Continuous>  dextrose 5% + sodium chloride 0.45% with potassium chloride 10 mEq/L 100 milliLiter(s) (50 mL/Hr) IV Continuous <Continuous>  dextrose 5% + sodium chloride 0.45% with potassium chloride 10 mEq/L 100 milliLiter(s) (50 mL/Hr) IV Continuous <Continuous>  enoxaparin Injectable 50 milliGRAM(s) SubCutaneous daily  fluconAZOLE IVPB 200 milliGRAM(s) IV Intermittent every 24 hours  fluconAZOLE IVPB      furosemide    Tablet 20 milliGRAM(s) Oral daily  multivitamin 1 Tablet(s) Oral daily  nystatin Powder 1 Application(s) Topical two times a day  pantoprazole   Suspension 40 milliGRAM(s) Enteral Tube daily  rifAXIMin 550 milliGRAM(s) Oral two times a day  zinc sulfate 220 milliGRAM(s) Oral daily    MEDICATIONS  (PRN):  acetaminophen   Tablet .. 650 milliGRAM(s) Oral every 6 hours PRN Temp greater or equal to 38C (100.4F)      RADIOLOGY & ADDITIONAL TESTS:    Imaging Personally Reviewed:  [ ] YES  [ ] NO    REVIEW OF SYSTEMS:  CONSTITUTIONAL: No fever, weight loss, or fatigue  EYES: No eye pain, visual disturbances, or discharge  ENMT:  No difficulty hearing, tinnitus, vertigo; No sinus or throat pain  NECK: No pain or stiffness  BREASTS: No pain, masses, or nipple discharge  RESPIRATORY: No cough, wheezing, chills or hemoptysis; No shortness of breath  CARDIOVASCULAR: No chest pain, palpitations, dizziness, or leg swelling  GASTROINTESTINAL: No abdominal or epigastric pain. No nausea, vomiting, or hematemesis; No diarrhea or constipation. No melena or hematochezia.  GENITOURINARY: No dysuria, frequency, hematuria, or incontinence  NEUROLOGICAL: No headaches, memory loss, loss of strength, numbness, or tremors  SKIN: No itching, burning, rashes, or lesions   LYMPH NODES: No enlarged glands  ENDOCRINE: No heat or cold intolerance; No hair loss  MUSCULOSKELETAL: No joint pain or swelling; No muscle, back, or extremity pain  PSYCHIATRIC: No depression, anxiety, mood swings, or difficulty sleeping  HEME/LYMPH: No easy bruising, or bleeding gums  ALLERY AND IMMUNOLOGIC: No hives or eczema      Consultant(s) Notes Reviewed:  [ x ] YES  [ ] NO    PHYSICAL EXAM:  GENERAL: NAD, well-groomed, well-developed  HEAD:  Atraumatic, Normocephalic  EYES: EOMI, PERRLA, conjunctiva and sclera clear  ENMT: No tonsillar erythema, exudates, or enlargement; Moist mucous membranes, Good dentition, No lesions  NECK: Supple, No JVD, Normal thyroid  NERVOUS SYSTEM:  Alert & Oriented X3, Good concentration; Motor Strength 5/5 B/L upper and lower extremities; DTRs 2+ intact and symmetric  CHEST/LUNG: Clear to percussion bilaterally; No rales, rhonchi, wheezing, or rubs  HEART: Regular rate and rhythm; No murmurs, rubs, or gallops  ABDOMEN: Soft, Nontender, Nondistended; Bowel sounds present  EXTREMITIES:  2+ Peripheral Pulses, No clubbing, cyanosis, or edema  LYMPH: No lymphadenopathy noted  SKIN: No rashes or lesions    Care Discussed with Consultants/Other Providers [ x] YES  [ ] NO

## 2020-11-22 NOTE — PROGRESS NOTE ADULT - SUBJECTIVE AND OBJECTIVE BOX
MARIBETH PADILLA    SCU progress note    INTERVAL HPI/OVERNIGHT EVENTS: ***NO ACUTE EVENTS OVERNIGHT    DNR [x ]   TRIAL OF INTUBATION, trial feeding tube/ trial of IV fluids/abx    Covid - 19 PCR: Not detected 11/17    The 4Ms    What Matters Most: see [facility]  Age appropriate Medications/Screen for High Risk Medication: Yes  Mentation: see CAM below  Mobility: defer to physical exam    The Confusion Assessment Method (CAM) Diagnostic Algorithm     1: Acute Onset or Fluctuating Course  - Is there evidence of an acute change in mental status from the patient’s baseline? Did the (abnormal) behavior  fluctuate during the day, that is, tend to come and go, or increase and decrease in severity?  [ ] YES x] NO     2: Inattention  - Did the patient have difficulty focusing attention, being easily distractible, or having difficulty keeping track of what was being said?  [ ] YES [ x] NO     3: Disorganized thinking  -Was the patient’s thinking disorganized or incoherent, such as rambling or irrelevant conversation, unclear or illogical flow of ideas, or unpredictable switching from subject to subject?  [ ] YES [x ] NO    4: Altered Level of consciousness?  [ ] YES [x ] NO    The diagnosis of delirium by CAM requires the presence of features 1 and 2 and either 3 or 4.    PRESSORS: [ ] YES [x ] NO  cefepime   IVPB      cefepime   IVPB 1000 milliGRAM(s) IV Intermittent every 24 hours  DAPTOmycin IVPB      DAPTOmycin IVPB 350 milliGRAM(s) IV Intermittent every 24 hours  fluconAZOLE IVPB 200 milliGRAM(s) IV Intermittent every 24 hours  fluconAZOLE IVPB      rifAXIMin 550 milliGRAM(s) Oral two times a day    Cardiovascular:  Heart Failure  Acute   Acute on Chronic  Chronic       furosemide    Tablet 20 milliGRAM(s) Oral daily    Pulmonary:    Hematalogic:  enoxaparin Injectable 50 milliGRAM(s) SubCutaneous daily    Other:  acetaminophen   Tablet .. 650 milliGRAM(s) Oral every 6 hours PRN  ascorbic acid 500 milliGRAM(s) Oral daily  dextrose 5% 1000 milliLiter(s) IV Continuous <Continuous>  dextrose 5% + sodium chloride 0.45% with potassium chloride 10 mEq/L 100 milliLiter(s) IV Continuous <Continuous>  dextrose 5% + sodium chloride 0.45% with potassium chloride 10 mEq/L 100 milliLiter(s) IV Continuous <Continuous>  multivitamin 1 Tablet(s) Oral daily  nystatin Powder 1 Application(s) Topical two times a day  pantoprazole   Suspension 40 milliGRAM(s) Enteral Tube daily  zinc sulfate 220 milliGRAM(s) Oral daily    acetaminophen   Tablet .. 650 milliGRAM(s) Oral every 6 hours PRN  ascorbic acid 500 milliGRAM(s) Oral daily  cefepime   IVPB      cefepime   IVPB 1000 milliGRAM(s) IV Intermittent every 24 hours  DAPTOmycin IVPB      DAPTOmycin IVPB 350 milliGRAM(s) IV Intermittent every 24 hours  dextrose 5% 1000 milliLiter(s) IV Continuous <Continuous>  dextrose 5% + sodium chloride 0.45% with potassium chloride 10 mEq/L 100 milliLiter(s) IV Continuous <Continuous>  dextrose 5% + sodium chloride 0.45% with potassium chloride 10 mEq/L 100 milliLiter(s) IV Continuous <Continuous>  enoxaparin Injectable 50 milliGRAM(s) SubCutaneous daily  fluconAZOLE IVPB 200 milliGRAM(s) IV Intermittent every 24 hours  fluconAZOLE IVPB      furosemide    Tablet 20 milliGRAM(s) Oral daily  multivitamin 1 Tablet(s) Oral daily  nystatin Powder 1 Application(s) Topical two times a day  pantoprazole   Suspension 40 milliGRAM(s) Enteral Tube daily  rifAXIMin 550 milliGRAM(s) Oral two times a day  zinc sulfate 220 milliGRAM(s) Oral daily    Drug Dosing Weight  Height (cm): 167.6 (21 Oct 2020 02:26)  Weight (kg): 56.2 (21 Oct 2020 02:26)  BMI (kg/m2): 20 (21 Oct 2020 02:26)  BSA (m2): 1.63 (21 Oct 2020 02:26)    CENTRAL LINE: [ ] YES [ ] NO  LOCATION:   DATE INSERTED:  REMOVE: [ ] YES [ ] NO  EXPLAIN:    TREJO: [ ] YES [ ] NO    DATE INSERTED:  REMOVE:  [ ] YES [ ] NO  EXPLAIN:    PAST MEDICAL & SURGICAL HISTORY:  Anasarca    Pulmonary embolism    DVT (deep venous thrombosis)    SBO (small bowel obstruction)    H/O exploratory laparotomy                11-21 @ 07:01  -  11-22 @ 07:00  --------------------------------------------------------  IN: 200 mL / OUT: 0 mL / NET: 200 mL            PHYSICAL EXAM:    GENERAL: NAD, well-groomed, well-developed  HEAD:  Atraumatic, Normocephalic  EYES: EOMI, PERRLA, conjunctiva and sclera clear  ENMT: No tonsillar erythema, exudates, or enlargement; Moist mucous membranes, Good dentition, No lesions  NECK: Supple, No JVD, Normal thyroid  NERVOUS SYSTEM:  Alert & Oriented X3, Good concentration; Motor Strength 5/5 B/L upper and lower extremities; DTRs 2+ intact and symmetric  CHEST/LUNG: Clear to percussion bilaterally; No rales, rhonchi, wheezing, or rubs  HEART: Regular rate and rhythm; No murmurs, rubs, or gallops  ABDOMEN: Soft, Nontender, Nondistended; Bowel sounds present  EXTREMITIES:  2+ Peripheral Pulses, No clubbing, cyanosis, or edema  LYMPH: No lymphadenopathy noted  SKIN: No rashes or lesions      LABS:  CBC Full  -  ( 22 Nov 2020 09:20 )  WBC Count : 11.57 K/uL  RBC Count : 2.21 M/uL  Hemoglobin : 6.4 g/dL  Hematocrit : 20.5 %  Platelet Count - Automated : 243 K/uL  Mean Cell Volume : 92.8 fl  Mean Cell Hemoglobin : 29.0 pg  Mean Cell Hemoglobin Concentration : 31.2 gm/dL  Auto Neutrophil # : x  Auto Lymphocyte # : x  Auto Monocyte # : x  Auto Eosinophil # : x  Auto Basophil # : x  Auto Neutrophil % : x  Auto Lymphocyte % : x  Auto Monocyte % : x  Auto Eosinophil % : x  Auto Basophil % : x    11-22    145  |  112<H>  |  25<H>  ----------------------------<  73  3.4<L>   |  27  |  1.19    Ca    7.2<L>      22 Nov 2020 08:14  Phos  2.8     11-22  Mg     1.4     11-22    TPro  5.8<L>  /  Alb  1.9<L>  /  TBili  2.6<H>  /  DBili  x   /  AST  51<H>  /  ALT  35  /  AlkPhos  142<H>  11-22              [  ]  DVT Prophylaxis  [  ]  Nutrition, Brand, Rate         Goal Rate        Abnormal Nutritional Status -  Malnutrition   Cachexia      Morbid Obesity BMI >/=40    RADIOLOGY & ADDITIONAL STUDIES:  ***    Goals of Care Discussion with Family/Proxy/Other   - see consult note from palliative team 10/23 and subsequent progress notes       MARIBETH PADILLA    SCU progress note    INTERVAL HPI/OVERNIGHT EVENTS: ***NO ACUTE EVENTS OVERNIGHT    DNR [x ]   TRIAL OF INTUBATION, trial feeding tube/ trial of IV fluids/abx    Covid - 19 PCR: Not detected 11/17    The 4Ms    What Matters Most: see [facility]  Age appropriate Medications/Screen for High Risk Medication: Yes  Mentation: see CAM below  Mobility: defer to physical exam    The Confusion Assessment Method (CAM) Diagnostic Algorithm     1: Acute Onset or Fluctuating Course  - Is there evidence of an acute change in mental status from the patient’s baseline? Did the (abnormal) behavior  fluctuate during the day, that is, tend to come and go, or increase and decrease in severity?  [ ] YES x] NO     2: Inattention  - Did the patient have difficulty focusing attention, being easily distractible, or having difficulty keeping track of what was being said?  [ ] YES [ x] NO     3: Disorganized thinking  -Was the patient’s thinking disorganized or incoherent, such as rambling or irrelevant conversation, unclear or illogical flow of ideas, or unpredictable switching from subject to subject?  [ ] YES [x ] NO    4: Altered Level of consciousness?  [ ] YES [x ] NO    The diagnosis of delirium by CAM requires the presence of features 1 and 2 and either 3 or 4.    PRESSORS: [ ] YES [x ] NO  cefepime   IVPB      cefepime   IVPB 1000 milliGRAM(s) IV Intermittent every 24 hours  DAPTOmycin IVPB      DAPTOmycin IVPB 350 milliGRAM(s) IV Intermittent every 24 hours  fluconAZOLE IVPB 200 milliGRAM(s) IV Intermittent every 24 hours  fluconAZOLE IVPB      rifAXIMin 550 milliGRAM(s) Oral two times a day    Cardiovascular:  Heart Failure  Acute   Acute on Chronic  Chronic       furosemide    Tablet 20 milliGRAM(s) Oral daily    Pulmonary:    Hematalogic:  enoxaparin Injectable 50 milliGRAM(s) SubCutaneous daily    Other:  acetaminophen   Tablet .. 650 milliGRAM(s) Oral every 6 hours PRN  ascorbic acid 500 milliGRAM(s) Oral daily  dextrose 5% 1000 milliLiter(s) IV Continuous <Continuous>  dextrose 5% + sodium chloride 0.45% with potassium chloride 10 mEq/L 100 milliLiter(s) IV Continuous <Continuous>  dextrose 5% + sodium chloride 0.45% with potassium chloride 10 mEq/L 100 milliLiter(s) IV Continuous <Continuous>  multivitamin 1 Tablet(s) Oral daily  nystatin Powder 1 Application(s) Topical two times a day  pantoprazole   Suspension 40 milliGRAM(s) Enteral Tube daily  zinc sulfate 220 milliGRAM(s) Oral daily    acetaminophen   Tablet .. 650 milliGRAM(s) Oral every 6 hours PRN  ascorbic acid 500 milliGRAM(s) Oral daily  cefepime   IVPB      cefepime   IVPB 1000 milliGRAM(s) IV Intermittent every 24 hours  DAPTOmycin IVPB      DAPTOmycin IVPB 350 milliGRAM(s) IV Intermittent every 24 hours  dextrose 5% 1000 milliLiter(s) IV Continuous <Continuous>  dextrose 5% + sodium chloride 0.45% with potassium chloride 10 mEq/L 100 milliLiter(s) IV Continuous <Continuous>  dextrose 5% + sodium chloride 0.45% with potassium chloride 10 mEq/L 100 milliLiter(s) IV Continuous <Continuous>  enoxaparin Injectable 50 milliGRAM(s) SubCutaneous daily  fluconAZOLE IVPB 200 milliGRAM(s) IV Intermittent every 24 hours  fluconAZOLE IVPB      furosemide    Tablet 20 milliGRAM(s) Oral daily  multivitamin 1 Tablet(s) Oral daily  nystatin Powder 1 Application(s) Topical two times a day  pantoprazole   Suspension 40 milliGRAM(s) Enteral Tube daily  rifAXIMin 550 milliGRAM(s) Oral two times a day  zinc sulfate 220 milliGRAM(s) Oral daily    Drug Dosing Weight  Height (cm): 167.6 (21 Oct 2020 02:26)  Weight (kg): 56.2 (21 Oct 2020 02:26)  BMI (kg/m2): 20 (21 Oct 2020 02:26)  BSA (m2): 1.63 (21 Oct 2020 02:26)    CENTRAL LINE: [ ] YES [ ] NO  LOCATION:   DATE INSERTED:  REMOVE: [ ] YES [ ] NO  EXPLAIN:    TREJO: [ ] YES [ ] NO    DATE INSERTED:  REMOVE:  [ ] YES [ ] NO  EXPLAIN:    PAST MEDICAL & SURGICAL HISTORY:  Anasarca    Pulmonary embolism    DVT (deep venous thrombosis)    SBO (small bowel obstruction)    H/O exploratory laparotomy                11-21 @ 07:01  -  11-22 @ 07:00  --------------------------------------------------------  IN: 200 mL / OUT: 0 mL / NET: 200 mL            PHYSICAL EXAM:    GENERAL: NAD, well-groomed, well-developed  HEAD:  Atraumatic, Normocephalic  EYES: EOMI, PERRLA, conjunctiva and sclera clear  ENMT: No tonsillar erythema, exudates, or enlargement; Moist mucous membranes, Good dentition, No lesions  NECK: Supple, No JVD, Normal thyroid  NERVOUS SYSTEM:  awake   CHEST/LUNG: Clear to percussion bilaterally; No rales, rhonchi, wheezing, or rubs  HEART: Regular rate and rhythm; No murmurs, rubs, or gallops  ABDOMEN: Soft, Nontender, Nondistended; Bowel sounds present  EXTREMITIES:  2+ Peripheral Pulses, No clubbing, cyanosis, or edema  LYMPH: No lymphadenopathy noted  SKIN: No rashes or lesions      LABS:  CBC Full  -  ( 22 Nov 2020 09:20 )  WBC Count : 11.57 K/uL  RBC Count : 2.21 M/uL  Hemoglobin : 6.4 g/dL  Hematocrit : 20.5 %  Platelet Count - Automated : 243 K/uL  Mean Cell Volume : 92.8 fl  Mean Cell Hemoglobin : 29.0 pg  Mean Cell Hemoglobin Concentration : 31.2 gm/dL  Auto Neutrophil # : x  Auto Lymphocyte # : x  Auto Monocyte # : x  Auto Eosinophil # : x  Auto Basophil # : x  Auto Neutrophil % : x  Auto Lymphocyte % : x  Auto Monocyte % : x  Auto Eosinophil % : x  Auto Basophil % : x    11-22    145  |  112<H>  |  25<H>  ----------------------------<  73  3.4<L>   |  27  |  1.19    Ca    7.2<L>      22 Nov 2020 08:14  Phos  2.8     11-22  Mg     1.4     11-22    TPro  5.8<L>  /  Alb  1.9<L>  /  TBili  2.6<H>  /  DBili  x   /  AST  51<H>  /  ALT  35  /  AlkPhos  142<H>  11-22              [  ]  DVT Prophylaxis  [  ]  Nutrition, Brand, Rate         Goal Rate        Abnormal Nutritional Status -  Malnutrition   Cachexia      Morbid Obesity BMI >/=40    RADIOLOGY & ADDITIONAL STUDIES:  ***    Goals of Care Discussion with Family/Proxy/Other   - see consult note from palliative team 10/23 and subsequent progress notes

## 2020-11-23 NOTE — PROGRESS NOTE ADULT - PROBLEM SELECTOR PLAN 5
Stage IV  S/P sacral wound debridement  No active infection per Surgery `  gradual uptrending of WBC  xray pelvis ordered: assess for OM given sacral wound  Continue fluconazole as per ID.   con't Daptomycin (ordered 11/17) Monitor CK while on Dapto   Continue wound care as ordered.  Turn every 2 hours.  OOB daily. Stage IV-unstageable  S/P sacral wound debridement  No active infection per Surgery `  f/u MRI r/o OM   Continue fluconazole as per ID.   con't Daptomycin (ordered 11/17) Monitor CK while on Dapto   Continue wound care as ordered.  Turn every 2 hours.  OOB daily.

## 2020-11-23 NOTE — PROGRESS NOTE ADULT - SUBJECTIVE AND OBJECTIVE BOX
Patient is seen and examined at the bed side, is afebrile today.  The kidney function has improved significantly.       REVIEW OF SYSTEMS: All other review systems are negative      ALLERGIES: No Known Allergies        Vital Signs Last 24 Hrs  T(C): 37.1 (23 Nov 2020 14:39), Max: 38.2 (23 Nov 2020 05:40)  T(F): 98.8 (23 Nov 2020 14:39), Max: 100.8 (23 Nov 2020 05:40)  HR: 101 (23 Nov 2020 13:13) (82 - 101)  BP: 112/42 (23 Nov 2020 14:39) (107/69 - 123/76)  BP(mean): --  RR: 20 (23 Nov 2020 14:39) (16 - 20)  SpO2: 100% (23 Nov 2020 14:39) (96% - 100%)        PHYSICAL EXAM:  GENERAL: Not in distress, on oxygen via NC  CHEST/LUNG: Not using accessory muscles   HEART: s1 and s2 present  ABDOMEN:  Nontender and  Nondistended  EXTREMITIES: B/L UE edematous improved  CNS: Awake and Alert         LABS:                                   9.5    9.57  )-----------( 239      ( 23 Nov 2020 06:35 )             30.4                         6.4    11.57 )-----------( 243      ( 22 Nov 2020 09:20 )             20.5                         9.4    13.14 )-----------( 149      ( 14 Nov 2020 07:58 )             28.7       11-23    148<H>  |  115<H>  |  24<H>  ----------------------------<  67<L>  4.1   |  25  |  1.21    Ca    7.5<L>      23 Nov 2020 06:35  Phos  2.8     11-23  Mg     2.2     11-23    TPro  6.2  /  Alb  2.1<L>  /  TBili  2.8<H>  /  DBili  x   /  AST  48<H>  /  ALT  35  /  AlkPhos  162<H>  11-23    11-22    145  |  112<H>  |  25<H>  ----------------------------<  73  3.4<L>   |  27  |  1.19    Ca    7.2<L>      22 Nov 2020 08:14  Phos  2.8     11-22  Mg     1.4     11-22    TPro  5.8<L>  /  Alb  1.9<L>  /  TBili  2.6<H>  /  DBili  x   /  AST  51<H>  /  ALT  35  /  AlkPhos  142<H>  11-22        11-06    138  |  104  |  37<H>  ----------------------------<  81  3.9   |  25  |  2.37<H>    Ca    8.1<L>      06 Nov 2020 06:20  Phos  3.4     11-06  Mg     2.0     11-06    TPro  5.1<L>  /  Alb  2.8<L>  /  TBili  9.2<H>  /  DBili  x   /  AST  233<H>  /  ALT  99<H>  /  AlkPhos  96  11-06      Vancomycin Level, Trough (11.07.20 @ 21:49)   Vancomycin Level, Trough: 16.1:     Vancomycin Level, Trough (11.07.20 @ 07:08)   Vancomycin Level, Trough: 19.5    vanVancomycin Level, Trough (11.06.20 @ 06:20)   Vancomycin Level, Trough: 19.9:         MEDICATIONS  (STANDING):      ascorbic acid 500 milliGRAM(s) Oral daily  cefepime   IVPB      cefepime   IVPB 1000 milliGRAM(s) IV Intermittent every 24 hours  DAPTOmycin IVPB      DAPTOmycin IVPB 350 milliGRAM(s) IV Intermittent every 24 hours  dextrose 5% 1000 milliLiter(s) (50 mL/Hr) IV Continuous <Continuous>  enoxaparin Injectable 50 milliGRAM(s) SubCutaneous daily  fluconAZOLE IVPB 200 milliGRAM(s) IV Intermittent every 24 hours  fluconAZOLE IVPB      furosemide    Tablet 20 milliGRAM(s) Oral daily  multivitamin 1 Tablet(s) Oral daily  nystatin Powder 1 Application(s) Topical two times a day  pantoprazole   Suspension 40 milliGRAM(s) Enteral Tube daily  rifAXIMin 550 milliGRAM(s) Oral two times a day  zinc sulfate 220 milliGRAM(s) Oral daily      RADIOLOGY & ADDITIONAL TESTS:    11/19/20 : Xray Chest 1 View- PORTABLE-Urgent (Xray Chest 1 View- PORTABLE-Urgent .) (11.19.20 @ 23:53): Improvement in bilateral airspace disease with persistent bibasilar consolidation and small effusions.    11/4/20 : Xray Chest 1 View- PORTABLE-Routine (Xray Chest 1 View- PORTABLE-Routine in AM.) (11.04.20 @ 10:59) Reexpansion of the left lung with residual left pleural effusion and/or infiltrate.  Right pulmonary edema unchanged.  Tubes and catheters in satisfactory position.      10/25/20: Xray Chest 1 View- PORTABLE-Routine (Xray Chest 1 View- PORTABLE-Routine in AM.) (10.25.20 @ 09:21) Frontal expiratory view of the chest shows the heart to be similar in size. Endotracheal tube, right jugular line and feeding tube remain present. The lungs show similar left upper lobe infiltrate with progression of right perihilar infiltrate. Pleural effusions are similar. There is no evidence of pneumothorax.    10/21/20 : CT Abdomen and Pelvis w/ Oral Cont and w/ IV Cont (10.21.20 @ 15:59) Mural thickening of the left colon and rectum. Liquid stool in the colon. Apparent segmental mural thickening of the mid to distal small bowel and the proximal duodenum. Findings may represent nonspecific enterocolitis, noninfectious inflammatory bowel disease, or ischemic bowel. Clinical correlation is recommended. The celiac axis artery, SMA, and KINA are patent without stenosis.    Dilatation of the mid small bowel is again noted. Oral contrast has reached the terminal ileum. Findings may represent small bowel obstruction, or ileus related to nonspecific enterocolitis.   Cholelithiasis. Moderate to large ascites in the abdomen, increased since the previous examination. 5 mm nonspecific noncalcified left upper lobe lung nodule; if the patient's is in the high risk category (i.e. smoker), follow-up chest CT may be pursued in 12 months to ensure stability. Combination of atelectasis and consolidation in the left lower lobe.  Possible 1.0 cm hypodense lesion in the left lobe of the thyroid. Thyroid ultrasound may be pursued for further evaluation.   Mild bilateral pleural effusions.  Aging determinate compression fracture at T5 vertebra.          MICROBIOLOGY DATA:    Urine Microscopic-Add On (NC) (11.20.20 @ 04:12)   Red Blood Cell - Urine: 5-10 /HPF   White Blood Cell - Urine: 11-25 /HPF   Calcium Oxalate Crystals: Few /HPF   Bacteria: Moderate /HPF   Comment - Urine: few amorphous urates   Epithelial Cells: Few /HPF     Culture - Blood (11.19.20 @ 10:19)   Specimen Source: .Blood Blood-Peripheral   Culture Results: No growth to date.     Culture - Blood (11.19.20 @ 10:19)   Specimen Source: .Blood Blood-Peripheral   Culture Results: No growth to date.     Culture - Surgical Swab (11.12.20 @ 05:01)   Specimen Source: .Surgical Swab Sacral decub   Culture Results:  Few Enterococcus faecium Susceptibility to follow.   Rare Candida albicans "Susceptibilities not performed"     Culture - Sputum . (10.26.20 @ 00:26)   - Ampicillin/Sulbactam: R <=8/4   - Cefazolin: R >16   - Ceftazidime: I 16   - Clindamycin: R >4   - Erythromycin: R >4   - Gentamicin: S <=1 Should not be used as monotherapy   - Levofloxacin: S <=0.5   - Linezolid: S 2   - Oxacillin: R >2   - Penicillin: R >8   - RIF- Rifampin: S <=1 Should not be used as monotherapy   - Tetra/Doxy: S <=1   - Trimethoprim/Sulfamethoxazole: S <=0.5/9.5   - Trimethoprim/Sulfamethoxazole: S <=0.5/9.5   - Vancomycin: S 1     MRSA/MSSA PCR (10.21.20 @ 09:41)   MRSA PCR Result.: Detected:       Culture - Blood (10.20.20 @ 22:09)   Specimen Source: .Blood Blood   Culture Results:  No growth to date.     Culture - Blood (10.20.20 @ 22:09)   Specimen Source: .Blood Blood   Culture Results:   No growth to date.     Culture - Blood in AM (10.16.20 @ 10:13)   Specimen Source: .Blood Blood-Peripheral   Culture Results: No growth to date.     Culture - Blood in AM (10.16.20 @ 10:13)   Specimen Source: .Blood Blood-Peripheral   Culture Results: No growth to date.     Culture - Blood (10.13.20 @ 22:23)   Growth in anaerobic bottle: Gram Negative Rods   Specimen Source: .Blood Blood-Peripheral   Organism: Blood Culture PCR   Culture Results: Growth in anaerobic bottle: Bacteroides fragilis   "Susceptibilities not performed"     Culture - Blood (10.13.20 @ 22:23)   Specimen Source: .Blood Blood-Peripheral   Culture Results:   No growth to date.             Assessment and Plan:   · Assessment	  Patient is a 65y old  Female from home, lives with daughter, ambulates with walker with PMH of recurrent SBO's, s/p exp lap, SB resection in 2015, ex lap, ALBINA in 2018, DVT, PE, on Xarelto, IVC filter, chronic leg swelling, and anasarca, Now send in to the ER by her PCP, Dr. Ross, for evaluation of  generalized weakness and hypotension BP of 80/40 during office visit. On admission, she found to have no fever and BP was in lower normal range and no Leukocytosis. The CXR is clear and CT abd/pelvis consistent with Ileus. The ID consult requested to assist with evaluation of infectious etiology of episode of hypotension. Found to have Bacteroides bacteremia and now developed septic shock on Cefepime and Flagyl. Hence transferred to ICU. 10/20/20.    # Hypotension  at office- BP of 80/40 - resolved   #  Bacteroides fragilis Bacteremia - 10/13/20 - ? source most likely GI- Repeat Blood Cxs have NGTD 10/16/20  # Ileus  - h/o recurrent SBO and s/p EXp. LAp  # COVID 19 negative   # Septic shock ( Hypothermia + hypotensive)- transferred to ICU since requiring pressor- source GI, The CT abd/pelvis shows nonspecific enterocolitis, noninfectious inflammatory bowel disease, or ischemic bowel, along with ileus.   # Pneumonia- HCAP vs Aspiration - on CXR 10/24 and CT chest 10/21- sputum Cx grew Methicillin resistant Staphylococcus aureus  and Stenotrophomonas maltophilia.  # S/p extubated 11/9/20  # Infected sacral ulcer- s/p debridement and culture grew Enterococcus and Candida, sensitivity is pending  # Fever- 11/8 and 11/19- BCX NGTD 11/19 - The CXR shows Improvement in bilateral airspace disease with persistent bibasilar consolidation. She is s/p removal of velazquez catheter and passed TOV.      would recommend:    1. Please obtain Imaging of pelvis to rule out osteomyelitis, if negative for OM then need Abx until 11/24/20, otherwise 6 weeks  2. Continue Daptomycin to cover VRE  and  Fluconazole to cover Candida in sacral wound culture  3. Monitor kidney function, is improving  4. Aspiration precaution  5. Continue Sacral Wound care  and Frequent Repositioning   6. Monitor CPK level while on Daptomycin      Attending Attestation:    Spent more than 45 minutes on total encounter, more than 50 % of the visit was spent counseling and/or coordinating care by the Attending physician.       Patient is seen and examined at the bed side, is afebrile now.  She has no new complaints.  The WBC count is normal.      REVIEW OF SYSTEMS: All other review systems are negative      ALLERGIES: No Known Allergies        Vital Signs Last 24 Hrs  T(C): 37.1 (23 Nov 2020 14:39), Max: 38.2 (23 Nov 2020 05:40)  T(F): 98.8 (23 Nov 2020 14:39), Max: 100.8 (23 Nov 2020 05:40)  HR: 101 (23 Nov 2020 13:13) (82 - 101)  BP: 112/42 (23 Nov 2020 14:39) (107/69 - 123/76)  BP(mean): --  RR: 20 (23 Nov 2020 14:39) (16 - 20)  SpO2: 100% (23 Nov 2020 14:39) (96% - 100%)        PHYSICAL EXAM:  GENERAL: Not in distress, on oxygen via NC  CHEST/LUNG: Not using accessory muscles   HEART: s1 and s2 present  ABDOMEN:  Nontender and  Nondistended  EXTREMITIES: B/L UE edematous improved  CNS: Awake and Alert         LABS:                                   9.5    9.57  )-----------( 239      ( 23 Nov 2020 06:35 )             30.4                         6.4    11.57 )-----------( 243      ( 22 Nov 2020 09:20 )             20.5                         9.4    13.14 )-----------( 149      ( 14 Nov 2020 07:58 )             28.7       11-23    148<H>  |  115<H>  |  24<H>  ----------------------------<  67<L>  4.1   |  25  |  1.21    Ca    7.5<L>      23 Nov 2020 06:35  Phos  2.8     11-23  Mg     2.2     11-23    TPro  6.2  /  Alb  2.1<L>  /  TBili  2.8<H>  /  DBili  x   /  AST  48<H>  /  ALT  35  /  AlkPhos  162<H>  11-23    11-22    145  |  112<H>  |  25<H>  ----------------------------<  73  3.4<L>   |  27  |  1.19    Ca    7.2<L>      22 Nov 2020 08:14  Phos  2.8     11-22  Mg     1.4     11-22    TPro  5.8<L>  /  Alb  1.9<L>  /  TBili  2.6<H>  /  DBili  x   /  AST  51<H>  /  ALT  35  /  AlkPhos  142<H>  11-22 11-06    138  |  104  |  37<H>  ----------------------------<  81  3.9   |  25  |  2.37<H>    Ca    8.1<L>      06 Nov 2020 06:20  Phos  3.4     11-06  Mg     2.0     11-06    TPro  5.1<L>  /  Alb  2.8<L>  /  TBili  9.2<H>  /  DBili  x   /  AST  233<H>  /  ALT  99<H>  /  AlkPhos  96  11-06      Vancomycin Level, Trough (11.07.20 @ 21:49)   Vancomycin Level, Trough: 16.1:     Vancomycin Level, Trough (11.07.20 @ 07:08)   Vancomycin Level, Trough: 19.5    vanVancomycin Level, Trough (11.06.20 @ 06:20)   Vancomycin Level, Trough: 19.9:         MEDICATIONS  (STANDING):      ascorbic acid 500 milliGRAM(s) Oral daily  cefepime   IVPB      cefepime   IVPB 1000 milliGRAM(s) IV Intermittent every 24 hours  DAPTOmycin IVPB      DAPTOmycin IVPB 350 milliGRAM(s) IV Intermittent every 24 hours  dextrose 5% 1000 milliLiter(s) (50 mL/Hr) IV Continuous <Continuous>  enoxaparin Injectable 50 milliGRAM(s) SubCutaneous daily  fluconAZOLE IVPB 200 milliGRAM(s) IV Intermittent every 24 hours  fluconAZOLE IVPB      furosemide    Tablet 20 milliGRAM(s) Oral daily  multivitamin 1 Tablet(s) Oral daily  nystatin Powder 1 Application(s) Topical two times a day  pantoprazole   Suspension 40 milliGRAM(s) Enteral Tube daily  rifAXIMin 550 milliGRAM(s) Oral two times a day  zinc sulfate 220 milliGRAM(s) Oral daily      RADIOLOGY & ADDITIONAL TESTS:    11/19/20 : Xray Chest 1 View- PORTABLE-Urgent (Xray Chest 1 View- PORTABLE-Urgent .) (11.19.20 @ 23:53): Improvement in bilateral airspace disease with persistent bibasilar consolidation and small effusions.    11/4/20 : Xray Chest 1 View- PORTABLE-Routine (Xray Chest 1 View- PORTABLE-Routine in AM.) (11.04.20 @ 10:59) Reexpansion of the left lung with residual left pleural effusion and/or infiltrate.  Right pulmonary edema unchanged.  Tubes and catheters in satisfactory position.      10/25/20: Xray Chest 1 View- PORTABLE-Routine (Xray Chest 1 View- PORTABLE-Routine in AM.) (10.25.20 @ 09:21) Frontal expiratory view of the chest shows the heart to be similar in size. Endotracheal tube, right jugular line and feeding tube remain present. The lungs show similar left upper lobe infiltrate with progression of right perihilar infiltrate. Pleural effusions are similar. There is no evidence of pneumothorax.    10/21/20 : CT Abdomen and Pelvis w/ Oral Cont and w/ IV Cont (10.21.20 @ 15:59) Mural thickening of the left colon and rectum. Liquid stool in the colon. Apparent segmental mural thickening of the mid to distal small bowel and the proximal duodenum. Findings may represent nonspecific enterocolitis, noninfectious inflammatory bowel disease, or ischemic bowel. Clinical correlation is recommended. The celiac axis artery, SMA, and KINA are patent without stenosis.    Dilatation of the mid small bowel is again noted. Oral contrast has reached the terminal ileum. Findings may represent small bowel obstruction, or ileus related to nonspecific enterocolitis.   Cholelithiasis. Moderate to large ascites in the abdomen, increased since the previous examination. 5 mm nonspecific noncalcified left upper lobe lung nodule; if the patient's is in the high risk category (i.e. smoker), follow-up chest CT may be pursued in 12 months to ensure stability. Combination of atelectasis and consolidation in the left lower lobe.  Possible 1.0 cm hypodense lesion in the left lobe of the thyroid. Thyroid ultrasound may be pursued for further evaluation.   Mild bilateral pleural effusions.  Aging determinate compression fracture at T5 vertebra.          MICROBIOLOGY DATA:    Urine Microscopic-Add On (NC) (11.20.20 @ 04:12)   Red Blood Cell - Urine: 5-10 /HPF   White Blood Cell - Urine: 11-25 /HPF   Calcium Oxalate Crystals: Few /HPF   Bacteria: Moderate /HPF   Comment - Urine: few amorphous urates   Epithelial Cells: Few /HPF     Culture - Blood (11.19.20 @ 10:19)   Specimen Source: .Blood Blood-Peripheral   Culture Results: No growth to date.     Culture - Blood (11.19.20 @ 10:19)   Specimen Source: .Blood Blood-Peripheral   Culture Results: No growth to date.     Culture - Surgical Swab (11.12.20 @ 05:01)   Specimen Source: .Surgical Swab Sacral decub   Culture Results:  Few Enterococcus faecium Susceptibility to follow.   Rare Candida albicans "Susceptibilities not performed"     Culture - Sputum . (10.26.20 @ 00:26)   - Ampicillin/Sulbactam: R <=8/4   - Cefazolin: R >16   - Ceftazidime: I 16   - Clindamycin: R >4   - Erythromycin: R >4   - Gentamicin: S <=1 Should not be used as monotherapy   - Levofloxacin: S <=0.5   - Linezolid: S 2   - Oxacillin: R >2   - Penicillin: R >8   - RIF- Rifampin: S <=1 Should not be used as monotherapy   - Tetra/Doxy: S <=1   - Trimethoprim/Sulfamethoxazole: S <=0.5/9.5   - Trimethoprim/Sulfamethoxazole: S <=0.5/9.5   - Vancomycin: S 1     MRSA/MSSA PCR (10.21.20 @ 09:41)   MRSA PCR Result.: Detected:       Culture - Blood (10.20.20 @ 22:09)   Specimen Source: .Blood Blood   Culture Results:  No growth to date.     Culture - Blood (10.20.20 @ 22:09)   Specimen Source: .Blood Blood   Culture Results:   No growth to date.     Culture - Blood in AM (10.16.20 @ 10:13)   Specimen Source: .Blood Blood-Peripheral   Culture Results: No growth to date.     Culture - Blood in AM (10.16.20 @ 10:13)   Specimen Source: .Blood Blood-Peripheral   Culture Results: No growth to date.     Culture - Blood (10.13.20 @ 22:23)   Growth in anaerobic bottle: Gram Negative Rods   Specimen Source: .Blood Blood-Peripheral   Organism: Blood Culture PCR   Culture Results: Growth in anaerobic bottle: Bacteroides fragilis   "Susceptibilities not performed"     Culture - Blood (10.13.20 @ 22:23)   Specimen Source: .Blood Blood-Peripheral   Culture Results:   No growth to date.

## 2020-11-23 NOTE — PROGRESS NOTE ADULT - PROBLEM SELECTOR PROBLEM 3
Encounter Date: 12/11/2019       History     Chief Complaint   Patient presents with    Cough     reports cough, nasal congestion, sore throat, and subjective fevers for about 1week. reports nausea yesterday but reports nausea has resolved. reports has been taking dayquil and nyquil without relief      Lizz Oneil, a 30 y.o. female  has no past medical history on file.     She presents to the ED evaluation of productive cough, nasal congestion and sore throat x 1 week.   Subjective fever.  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  Denies any history of smoking, pneumonia or asthma.  States that she has had some intermittent nausea but that has improved.  Denies any abdominal pain. States that she is trying OTC medications with little relief.     The history is provided by the patient.     Review of patient's allergies indicates:  No Known Allergies  No past medical history on file.  No past surgical history on file.  Family History   Problem Relation Age of Onset    Crohn's disease Father     Cancer Maternal Aunt      Social History     Tobacco Use    Smoking status: Never Smoker    Smokeless tobacco: Never Used   Substance Use Topics    Alcohol use: Yes     Alcohol/week: 1.0 standard drinks     Types: 1 Glasses of wine per week    Drug use: No     Review of Systems   Constitutional: Positive for chills and fever (Subjective).   HENT: Positive for congestion, sinus pressure and sore throat.    Respiratory: Positive for cough.    Gastrointestinal: Positive for nausea ( resolved). Negative for abdominal pain and vomiting.   Allergic/Immunologic: Negative for immunocompromised state.   Neurological: Negative for weakness.   All other systems reviewed and are negative.      Physical Exam     Initial Vitals [12/11/19 1829]   BP Pulse Resp Temp SpO2   120/63 87 20 98.2 °F (36.8 °C) 100 %      MAP       --         Physical Exam    Nursing note and vitals reviewed.  Constitutional: She appears  well-developed and well-nourished. She is not diaphoretic. No distress.   HENT:   Head: Normocephalic and atraumatic.   Right Ear: External ear normal.   Left Ear: External ear normal.   Nose: Nose normal.   Mouth/Throat: Uvula is midline and mucous membranes are normal. Posterior oropharyngeal edema present.   Eyes: Conjunctivae and EOM are normal.   Neck: Normal range of motion. No tracheal deviation present.   Cardiovascular: Normal rate and regular rhythm.   Pulmonary/Chest: Breath sounds normal. No respiratory distress. She has no wheezes. She has no rhonchi. She has no rales. She exhibits no tenderness.   Musculoskeletal: Normal range of motion.   Neurological: She is alert and oriented to person, place, and time.   Skin: Skin is warm and dry. Capillary refill takes less than 2 seconds. No rash noted. No erythema.   Psychiatric: She has a normal mood and affect. Thought content normal.         ED Course   Procedures  Labs Reviewed   POCT URINE PREGNANCY          Imaging Results          X-Ray Chest PA And Lateral (Final result)  Result time 12/11/19 19:27:13    Final result by Jeanette Garcia MD (12/11/19 19:27:13)                 Impression:      No acute intrathoracic abnormality.      Electronically signed by: Jeanette Garcia  Date:    12/11/2019  Time:    19:27             Narrative:    EXAMINATION:  CHEST PA AND LATERAL    CLINICAL HISTORY:  Cough    TECHNIQUE:  PA and lateral chest radiograph    COMPARISON:  <None.>    FINDINGS:  The cardiac silhouette is within normal limits.   There is no focal consolidation, pneumothorax, or pleural effusion.                                 Medical Decision Making:   Initial Assessment:   Cough, nasal congestion, sore throat  Differential Diagnosis:   Viral uri, strep, PNA   ED Management:  After complete evaluation, including thorough history and physical exam, the patient's symptoms are most likely due to viral upper respiratory infection. There are no  concerning features on physical exam to suggest bacterial otitis media/externa, sinusitis, pharyngitis, or peritonsillar abscess. Vital signs do not suggest sepsis. Lung sounds are clear and not consistent with pneumonia. There is no neck pain or limited ROM to suggest retropharyngeal abscess or meningitis. The patient will be treated with supportive care. Encouraged follow-up with PCP for further evaluation.                                     Clinical Impression:       ICD-10-CM ICD-9-CM   1. Viral URI with cough J06.9 465.9    B97.89    2. Cough R05 786.2                             Jeannie Barron PA-C  12/11/19 2017     Hypotension

## 2020-11-23 NOTE — PROGRESS NOTE ADULT - SUBJECTIVE AND OBJECTIVE BOX
had low grade temp still  drop of H/h required transfusion    ROS:  Negative except for:    MEDICATIONS  (STANDING):  ascorbic acid 500 milliGRAM(s) Oral daily  cefepime   IVPB      cefepime   IVPB 1000 milliGRAM(s) IV Intermittent every 24 hours  DAPTOmycin IVPB      DAPTOmycin IVPB 350 milliGRAM(s) IV Intermittent every 24 hours  dextrose 5% 1000 milliLiter(s) (50 mL/Hr) IV Continuous <Continuous>  enoxaparin Injectable 50 milliGRAM(s) SubCutaneous daily  fluconAZOLE IVPB 200 milliGRAM(s) IV Intermittent every 24 hours  fluconAZOLE IVPB      furosemide    Tablet 20 milliGRAM(s) Oral daily  multivitamin 1 Tablet(s) Oral daily  nystatin Powder 1 Application(s) Topical two times a day  pantoprazole   Suspension 40 milliGRAM(s) Enteral Tube daily  rifAXIMin 550 milliGRAM(s) Oral two times a day  zinc sulfate 220 milliGRAM(s) Oral daily    MEDICATIONS  (PRN):  acetaminophen   Tablet .. 650 milliGRAM(s) Oral every 6 hours PRN Temp greater or equal to 38C (100.4F)      Allergies    No Known Allergies    Intolerances        Vital Signs Last 24 Hrs  T(C): 38.2 (23 Nov 2020 05:40), Max: 38.2 (23 Nov 2020 05:40)  T(F): 100.8 (23 Nov 2020 05:40), Max: 100.8 (23 Nov 2020 05:40)  HR: 96 (23 Nov 2020 05:40) (67 - 96)  BP: 107/69 (23 Nov 2020 05:40) (96/58 - 126/67)  BP(mean): --  RR: 19 (23 Nov 2020 05:40) (16 - 19)  SpO2: 96% (23 Nov 2020 05:40) (96% - 100%)    PHYSICAL EXAM  General: adult in NAD  HEENT: clear oropharynx, anicteric sclera, pink conjunctiva  Neck: supple  CV: normal S1/S2 with no murmur rubs or gallops  Lungs: positive air movement b/l ant lungs,clear to auscultation, no wheezes, no rales  Abdomen: soft non-tender non-distended, no hepatosplenomegaly  Ext: no clubbing cyanosis or edema  Skin: no rashes and no petechiae  Neuro: alert and oriented X 4, no focal deficits  LABS:                          9.5    9.57  )-----------( 239      ( 23 Nov 2020 06:35 )             30.4         Mean Cell Volume : 91.0 fl  Mean Cell Hemoglobin : 28.4 pg  Mean Cell Hemoglobin Concentration : 31.3 gm/dL  Auto Neutrophil # : x  Auto Lymphocyte # : x  Auto Monocyte # : x  Auto Eosinophil # : x  Auto Basophil # : x  Auto Neutrophil % : x  Auto Lymphocyte % : x  Auto Monocyte % : x  Auto Eosinophil % : x  Auto Basophil % : x    Serial CBC  Hematocrit 30.4  Hemoglobin 9.5  Plat 239  RBC 3.34  WBC 9.57  Serial CBC  Hematocrit 20.5  Hemoglobin 6.4  Plat 243  RBC 2.21  WBC 11.57  Serial CBC  Hematocrit 21.7  Hemoglobin 6.7  Plat 248  RBC 2.32  WBC 11.54  Serial CBC  Hematocrit 24.6  Hemoglobin 7.5  Plat 245  RBC 2.61  WBC 11.27  Serial CBC  Hematocrit 24.0  Hemoglobin 7.5  Plat 244  RBC 2.58  WBC 11.43    11-23    148<H>  |  115<H>  |  24<H>  ----------------------------<  67<L>  4.1   |  25  |  1.21    Ca    7.5<L>      23 Nov 2020 06:35  Phos  2.8     11-23  Mg     2.2     11-23    TPro  6.2  /  Alb  2.1<L>  /  TBili  2.8<H>  /  DBili  x   /  AST  48<H>  /  ALT  35  /  AlkPhos  162<H>  11-23                    BLOOD SMEAR INTERPRETATION:       RADIOLOGY & ADDITIONAL STUDIES:

## 2020-11-23 NOTE — PROGRESS NOTE ADULT - PROBLEM SELECTOR PLAN 6
Secondary to chronic disease.  H/H 6.4/20.5 today.  2U PRBCs orded  Continue to monitor. Secondary to chronic disease.  Hgb improved to 9.5 after 2 units PRBC for Hgb 6.4  Continue to monitor.

## 2020-11-23 NOTE — PROGRESS NOTE ADULT - PROBLEM SELECTOR PLAN 2
EF 15-20%. Severe left ventricular dysfunction.  Pt euvolemic   Lasix po 20mg with hold parameters in setting of poor po intake.   Continue supportive care. EF 15-20%. Severe left ventricular dysfunction.  Does not appear fluid overload  Lasix po 20mg with hold parameters in setting of poor po intake.   Continue supportive care.

## 2020-11-23 NOTE — PROGRESS NOTE ADULT - ASSESSMENT
65y Female from home, lives with daughter, ambulates with walker with PMH of recurrent SBO's, s/p exp lap, SB resection in 2015, ex lap, ALBINA in 2018, DVT, PE, on Xarelto, IVC filter, chronic leg swelling, and anasarca, came in to the ED due to hypotension during office visit. Patient was found to be bacteremic with bacteroids fragilis. Admitted in ICU due to hypotension and hypothermia. patient completed course of antibiotics . BCx X2 negative. Ammonia level elevated and patient refused NGT placement, mental status deteriorated and patient desaturated to high 70%, patient got intubated on 10/24 . Sputum was positive for MRSA and Stenotrophomonas. patient started on vancomycin and Levaquin. patient was given lactulose for high ammonia level. kidney function and liver function started to deteriorate and patient was leaning toward multi organ failure . high dose of Lasix started and patient was aggressively diuresed. Kidney function improved . Ammonia level dropped to less than 10.  Mental status improved and patient extubated on 11/3/2020. Patient was seen by wound specialist and decubitus ulcer debrided. patient had a drop in h/h s/p debridement . received 1 unit PRBC .   11/12  Transferred from ICU to SCU  `11/17  Midodrine d/c secondary to daptomycin being started.  11/18 Febrile, Tmax 101.5. Repeat BC sent.   11/19 Discontinue velazquez.   11/20 febrile overnight.   11/22 s/p 2 units PRBC for Hgb 6.4

## 2020-11-23 NOTE — PROGRESS NOTE ADULT - PROBLEM SELECTOR PLAN 7
Encourage oral intake.  Oral intake remains poor.  Assist with feedings.  Continue 2Cal HN and ensure pudding.  c/w supplements:  MVI with  minerals, Nadja C 500mg BID  Appreciate Nutrition input

## 2020-11-23 NOTE — PROGRESS NOTE ADULT - PROBLEM SELECTOR PLAN 9
DVT and GI prophylaxis  Continue rifaximin  Dapto for total of 10 days  Discharge planning likely to [facility] when medically optimized DVT and GI prophylaxis  Continue rifaximin  Dapto for total of 10 days if negative OM . Otherwise, abx will be extended to total of 6 weeks.   Discharge planning likely to [facility] when medically optimized

## 2020-11-23 NOTE — PROGRESS NOTE ADULT - PROBLEM SELECTOR PLAN 2
EF 15-20%. Severe left ventricular dysfunction.  Does not appear fluid overload  Lasix po 20mg with hold parameters in setting of poor po intake.   Continue supportive care.

## 2020-11-23 NOTE — PROGRESS NOTE ADULT - SUBJECTIVE AND OBJECTIVE BOX
Patient was seen and examined  Patient is a 65y old  Female who presents with a chief complaint of Hypotension (23 Nov 2020 14:28)      INTERVAL HPI/OVERNIGHT EVENTS:  T(C): 37.1 (11-23-20 @ 14:39), Max: 38.2 (11-23-20 @ 05:40)  HR: 101 (11-23-20 @ 13:13) (67 - 101)  BP: 112/42 (11-23-20 @ 14:39) (96/58 - 123/76)  RR: 20 (11-23-20 @ 14:39) (16 - 20)  SpO2: 100% (11-23-20 @ 14:39) (96% - 100%)  Wt(kg): --  I&O's Summary    22 Nov 2020 07:01  -  23 Nov 2020 07:00  --------------------------------------------------------  IN: 350 mL / OUT: 0 mL / NET: 350 mL        LABS:                        9.5    9.57  )-----------( 239      ( 23 Nov 2020 06:35 )             30.4     11-23    148<H>  |  115<H>  |  24<H>  ----------------------------<  67<L>  4.1   |  25  |  1.21    Ca    7.5<L>      23 Nov 2020 06:35  Phos  2.8     11-23  Mg     2.2     11-23    TPro  6.2  /  Alb  2.1<L>  /  TBili  2.8<H>  /  DBili  x   /  AST  48<H>  /  ALT  35  /  AlkPhos  162<H>  11-23        CAPILLARY BLOOD GLUCOSE                  MEDICATIONS  (STANDING):  ascorbic acid 500 milliGRAM(s) Oral daily  cefepime   IVPB      cefepime   IVPB 1000 milliGRAM(s) IV Intermittent every 24 hours  DAPTOmycin IVPB      DAPTOmycin IVPB 350 milliGRAM(s) IV Intermittent every 24 hours  dextrose 5% 1000 milliLiter(s) (50 mL/Hr) IV Continuous <Continuous>  enoxaparin Injectable 50 milliGRAM(s) SubCutaneous daily  fluconAZOLE IVPB 200 milliGRAM(s) IV Intermittent every 24 hours  fluconAZOLE IVPB      furosemide    Tablet 20 milliGRAM(s) Oral daily  multivitamin 1 Tablet(s) Oral daily  nystatin Powder 1 Application(s) Topical two times a day  pantoprazole   Suspension 40 milliGRAM(s) Enteral Tube daily  rifAXIMin 550 milliGRAM(s) Oral two times a day  zinc sulfate 220 milliGRAM(s) Oral daily    MEDICATIONS  (PRN):  acetaminophen   Tablet .. 650 milliGRAM(s) Oral every 6 hours PRN Temp greater or equal to 38C (100.4F)      RADIOLOGY & ADDITIONAL TESTS:    Imaging Personally Reviewed:  [ ] YES  [ ] NO    REVIEW OF SYSTEMS:  CONSTITUTIONAL: No fever, weight loss, or fatigue  EYES: No eye pain, visual disturbances, or discharge  ENMT:  No difficulty hearing, tinnitus, vertigo; No sinus or throat pain  NECK: No pain or stiffness  BREASTS: No pain, masses, or nipple discharge  RESPIRATORY: No cough, wheezing, chills or hemoptysis; No shortness of breath  CARDIOVASCULAR: No chest pain, palpitations, dizziness, or leg swelling  GASTROINTESTINAL: No abdominal or epigastric pain. No nausea, vomiting, or hematemesis; No diarrhea or constipation. No melena or hematochezia.  GENITOURINARY: No dysuria, frequency, hematuria, or incontinence  NEUROLOGICAL: No headaches, memory loss, loss of strength, numbness, or tremors  SKIN: No itching, burning, rashes, or lesions   LYMPH NODES: No enlarged glands  ENDOCRINE: No heat or cold intolerance; No hair loss  MUSCULOSKELETAL: No joint pain or swelling; No muscle, back, or extremity pain  PSYCHIATRIC: No depression, anxiety, mood swings, or difficulty sleeping  HEME/LYMPH: No easy bruising, or bleeding gums  ALLERY AND IMMUNOLOGIC: No hives or eczema      Consultant(s) Notes Reviewed:  [ x ] YES  [ ] NO    PHYSICAL EXAM:  GENERAL: NAD, well-groomed, well-developed  HEAD:  Atraumatic, Normocephalic  EYES: EOMI, PERRLA, conjunctiva and sclera clear  ENMT: No tonsillar erythema, exudates, or enlargement; Moist mucous membranes, Good dentition, No lesions  NECK: Supple, No JVD, Normal thyroid  NERVOUS SYSTEM:  Alert & Oriented X3, Good concentration; Motor Strength 5/5 B/L upper and lower extremities; DTRs 2+ intact and symmetric  CHEST/LUNG: Clear to percussion bilaterally; No rales, rhonchi, wheezing, or rubs  HEART: Regular rate and rhythm; No murmurs, rubs, or gallops  ABDOMEN: Soft, Nontender, Nondistended; Bowel sounds present  EXTREMITIES:  2+ Peripheral Pulses, No clubbing, cyanosis, or edema  LYMPH: No lymphadenopathy noted  SKIN: No rashes or lesions    Care Discussed with Consultants/Other Providers [ x] YES  [ ] NO

## 2020-11-23 NOTE — PROGRESS NOTE ADULT - ASSESSMENT
Patient is a 65y old  Female from home, lives with daughter, ambulates with walker with PMH of recurrent SBO's, s/p exp lap, SB resection in 2015, ex lap, ALBINA in 2018, DVT, PE, on Xarelto, IVC filter, chronic leg swelling, and anasarca, Now send in to the ER by her PCP, Dr. Ross, for evaluation of  generalized weakness and hypotension BP of 80/40 during office visit. On admission, she found to have no fever and BP was in lower normal range and no Leukocytosis. The CXR is clear and CT abd/pelvis consistent with Ileus. The ID consult requested to assist with evaluation of infectious etiology of episode of hypotension. Found to have Bacteroides bacteremia and now developed septic shock on Cefepime and Flagyl. Hence transferred to ICU. 10/20/20.    # Hypotension  at office- BP of 80/40 - resolved   #  Bacteroides fragilis Bacteremia - 10/13/20 - ? source most likely GI- Repeat Blood Cxs have NGTD 10/16/20  # Ileus  - h/o recurrent SBO and s/p EXp. LAp  # COVID 19 negative   # Septic shock ( Hypothermia + hypotensive)- transferred to ICU since requiring pressor- source GI, The CT abd/pelvis shows nonspecific enterocolitis, noninfectious inflammatory bowel disease, or ischemic bowel, along with ileus.   # Pneumonia- HCAP vs Aspiration - on CXR 10/24 and CT chest 10/21- sputum Cx grew Methicillin resistant Staphylococcus aureus  and Stenotrophomonas maltophilia.  # S/p extubated 11/9/20  # Infected sacral ulcer- s/p debridement and culture grew Enterococcus and Candida, sensitivity is pending  # Fever- 11/8 and 11/19- BCX NGTD 11/19 - The CXR shows Improvement in bilateral airspace disease with persistent bibasilar consolidation. She is s/p removal of velazquez catheter and passed TOV.      would recommend:    1. Monitor Temp. and c/w supportive care  2. Please obtain Imaging of pelvis to rule out osteomyelitis, if negative for OM then need Abx until 11/24/20, otherwise 6 weeks  3. Continue Daptomycin to cover VRE  and  Fluconazole to cover Candida in sacral wound culture  4. Monitor kidney function, is improving  5. Aspiration precaution  6. Continue Sacral Wound care  and Frequent Repositioning       Attending Attestation:    Spent more than 45 minutes on total encounter, more than 50 % of the visit was spent counseling and/or coordinating care by the Attending physician.

## 2020-11-23 NOTE — PROGRESS NOTE ADULT - PROBLEM SELECTOR PLAN 1
Secondary to bacteremia . Bacteroides fragilis per BC on 10/13  Sputum positive for MRSA and Stenotrophomas  Aspiration pneumonia vs HCAP   Repeat BC 10/16 and 11/19 NGTD   UC on 11/20 NGTD    Infected sacral ulcer- s/p debridement and culture grew Enterococcus (VRE) and Candida ( sensitivity is pending)  Leukocytosis resolved  Cdiff negative  on Dapto and Fluconazole  MRI sacrum , r/o OM   Monitor CK weekly while on Dapto  velazquez  discontinued 11/19.   Permafit as needed.   ID Dr. Patricio following         `

## 2020-11-23 NOTE — PROGRESS NOTE ADULT - ASSESSMENT
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  BC grew bacteroides.    Problem/Recommendation - 1:  fever again  will do fever w/u  blood culture, CXR, stool c.diff  Problem/Recommendation - 2:·  Problem: Anemia.  Recommendation: ferritin, b12 folate normalno hemolysis  most likley due to malnutrition from short bowel syndrome.she also had GIB multiple times before.   Problem/Recommendation - 3:·  Problem: Acute deep vein thrombosis (DVT) of other vein of upper extremity.  Recommendation: she has been on xarelto for 2 years.  DVT at left arm and left leg before.  in all CT scan i did not see report of IVC or hepatic vein thrombosis.  off xarelto and lovenox due to elevated PT/PTT. elevated PT/PTT due to malnutrition and antibiotics causing VITK def  now it is mostly recovered  it begins to rise again, correctable by mixing study  probably due to liver failure this time  her liver function recovered and she was extubated.  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  BC grew bacteroides.    Problem/Recommendation - 1:  fever again  will do fever w/u  blood culture, CXR, stool c.diff  on antibiotics  Problem/Recommendation - 2:·  Problem: Anemia.  Recommendation: ferritin, b12 folate normalno hemolysis  most likley due to malnutrition from short bowel syndrome.she also had GIB multiple times before.   the H/H dropped over weekend and she had transfsuion  ?bleeding vs acute infection causing acute anemia  Problem/Recommendation - 3:·  Problem: Acute deep vein thrombosis (DVT) of other vein of upper extremity.  Recommendation: she has been on xarelto for 2 years.  DVT at left arm and left leg before.  in all CT scan i did not see report of IVC or hepatic vein thrombosis.  off xarelto and lovenox due to elevated PT/PTT. elevated PT/PTT due to malnutrition and antibiotics causing VITK def  now it is mostly recovered  it begins to rise again, correctable by mixing study  probably due to liver failure this time  her liver function recovered and she was extubated.  on sq lovenox

## 2020-11-23 NOTE — PROGRESS NOTE ADULT - PROBLEM SELECTOR PLAN 7
Encourage oral intake.  Oral intake remains poor.  Assist with feedings.  Continue 2Cal HN and ensure pudding. Encourage oral intake.  Oral intake remains poor.  Assist with feedings.  Continue 2Cal HN and ensure pudding.  c/w supplements:  MVI with  minerals, Nadja C 500mg BID  Appreciate Nutrition input

## 2020-11-23 NOTE — PROGRESS NOTE ADULT - PROBLEM SELECTOR PLAN 6
Secondary to chronic disease.  Hgb improved to 9.5 after 2 units PRBC for Hgb 6.4  Continue to monitor.

## 2020-11-23 NOTE — PROGRESS NOTE ADULT - PROBLEM SELECTOR PLAN 1
Secondary to bacteremia . Bacteroides fragilis per BC on 10/13  Sputum positive for MRSA and Stenotrophomas  Aspiration pneumonia vs HCAP   Repeat BC 10/16 and 11/19 NGTD   UC on 11/20 NGTD    Infected sacral ulcer- s/p debridement and culture grew Enterococcus (VRE) and Candida ( sensitivity is pending)  Leukocytosis resolved  on Dapto and Fluconazole  f/u Xray pelvis , r/o OM  MRI sacrum , r/o OM    Monitor CK weekly while on Dapto  velazquez  discontinued 11/19.   Permafit as needed.     ID Dr. Patricio following         ` Secondary to bacteremia . Bacteroides fragilis per BC on 10/13  Sputum positive for MRSA and Stenotrophomas  Aspiration pneumonia vs HCAP   Repeat BC 10/16 and 11/19 NGTD   UC on 11/20 NGTD    Infected sacral ulcer- s/p debridement and culture grew Enterococcus (VRE) and Candida ( sensitivity is pending)  Leukocytosis resolved  Cdiff negative  on Dapto and Fluconazole  MRI sacrum , r/o OM   Monitor CK weekly while on Dapto  velazquez  discontinued 11/19.   Permafit as needed.   ID Dr. Patricio following         ` Secondary to bacteremia . Bacteroides fragilis per BC on 10/13  Sputum positive for MRSA and Stenotrophomas  Aspiration pneumonia vs HCAP   Repeat BC 10/16 and 11/19 NGTD   UC on 11/20 NGTD    Infected sacral ulcer- s/p debridement and culture grew Enterococcus (VRE) and Candida ( sensitivity is pending)  Leukocytosis resolved  Cdiff negative  on Dapto and Fluconazole  MRI sacrum , r/o OM   F/u Echo , eval for endocarditis  Monitor CK weekly while on Dapto  velazquez  discontinued 11/19.   Permafit as needed.   ID Dr. Patricio following         `

## 2020-11-23 NOTE — PROGRESS NOTE ADULT - SUBJECTIVE AND OBJECTIVE BOX
MARIBETH PADILLA    SCU progress note    INTERVAL HPI/OVERNIGHT EVENTS: ***s/p 2 units PRBC yesterday for Hgb 6.4 . Febrile this morning Tmax 100.8F     DNR [ x]   DNI  [  ] DNR / trial intubation/ trial feeding tube / trial IV fluids / use abx.     Covid - 19 PCR: not detected 11/17    The 4Ms    What Matters Most: see GOC  Age appropriate Medications/Screen for High Risk Medication: Yes  Mentation: see CAM below  Mobility: defer to physical exam    The Confusion Assessment Method (CAM) Diagnostic Algorithm     1: Acute Onset or Fluctuating Course  - Is there evidence of an acute change in mental status from the patient’s baseline? Did the (abnormal) behavior  fluctuate during the day, that is, tend to come and go, or increase and decrease in severity?  [ ] YES [x ] NO     2: Inattention  - Did the patient have difficulty focusing attention, being easily distractible, or having difficulty keeping track of what was being said?  [ ] YES [ x] NO     3: Disorganized thinking  -Was the patient’s thinking disorganized or incoherent, such as rambling or irrelevant conversation, unclear or illogical flow of ideas, or unpredictable switching from subject to subject?  [ ] YES [x ] NO    4: Altered Level of consciousness?  [ ] YES [x ] NO    The diagnosis of delirium by CAM requires the presence of features 1 and 2 and either 3 or 4.    PRESSORS: [ ] YES [x ] NO  cefepime   IVPB      cefepime   IVPB 1000 milliGRAM(s) IV Intermittent every 24 hours  DAPTOmycin IVPB      DAPTOmycin IVPB 350 milliGRAM(s) IV Intermittent every 24 hours  fluconAZOLE IVPB 200 milliGRAM(s) IV Intermittent every 24 hours  fluconAZOLE IVPB      rifAXIMin 550 milliGRAM(s) Oral two times a day    Cardiovascular:  Heart Failure : 10/27/2020 Echo ` EF 15-20%., Severe global left ventricular systolic dysfunction.    furosemide    Tablet 20 milliGRAM(s) Oral daily    Pulmonary:    Hematalogic:  enoxaparin Injectable 50 milliGRAM(s) SubCutaneous daily    Other:  acetaminophen   Tablet .. 650 milliGRAM(s) Oral every 6 hours PRN  ascorbic acid 500 milliGRAM(s) Oral daily  dextrose 5% 1000 milliLiter(s) IV Continuous <Continuous>  multivitamin 1 Tablet(s) Oral daily  nystatin Powder 1 Application(s) Topical two times a day  pantoprazole   Suspension 40 milliGRAM(s) Enteral Tube daily  zinc sulfate 220 milliGRAM(s) Oral daily    acetaminophen   Tablet .. 650 milliGRAM(s) Oral every 6 hours PRN  ascorbic acid 500 milliGRAM(s) Oral daily  cefepime   IVPB      cefepime   IVPB 1000 milliGRAM(s) IV Intermittent every 24 hours  DAPTOmycin IVPB      DAPTOmycin IVPB 350 milliGRAM(s) IV Intermittent every 24 hours  dextrose 5% 1000 milliLiter(s) IV Continuous <Continuous>  enoxaparin Injectable 50 milliGRAM(s) SubCutaneous daily  fluconAZOLE IVPB 200 milliGRAM(s) IV Intermittent every 24 hours  fluconAZOLE IVPB      furosemide    Tablet 20 milliGRAM(s) Oral daily  multivitamin 1 Tablet(s) Oral daily  nystatin Powder 1 Application(s) Topical two times a day  pantoprazole   Suspension 40 milliGRAM(s) Enteral Tube daily  rifAXIMin 550 milliGRAM(s) Oral two times a day  zinc sulfate 220 milliGRAM(s) Oral daily    Drug Dosing Weight  Height (cm): 167.6 (21 Oct 2020 02:26)  Weight (kg): 56.2 (21 Oct 2020 02:26)  BMI (kg/m2): 20 (21 Oct 2020 02:26)  BSA (m2): 1.63 (21 Oct 2020 02:26)    CENTRAL LINE: [ ] YES [ ] NO  LOCATION:   DATE INSERTED:  REMOVE: [ ] YES [ ] NO  EXPLAIN:    TREJO: [ ] YES [ ] NO    DATE INSERTED:  REMOVE:  [ ] YES [ ] NO  EXPLAIN:    PAST MEDICAL & SURGICAL HISTORY:  Anasarca    Pulmonary embolism    DVT (deep venous thrombosis)    SBO (small bowel obstruction)    H/O exploratory laparotomy                11-22 @ 07:01  -  11-23 @ 07:00  --------------------------------------------------------  IN: 350 mL / OUT: 0 mL / NET: 350 mL            PHYSICAL EXAM:    GENERAL:cachectic,  NAD  HEAD:  Atraumatic, Normocephalic  EYES: EOMI, PERRLA, conjunctiva and sclera clear  ENMT: No tonsillar erythema, exudates, or enlargement; Moist mucous membranes, No lesions  NECK: Supple, No JVD, Normal thyroid  NERVOUS SYSTEM:  Alert & Oriented X3, Follow commands.  Motor Strength 4-5/5 B/L upper, 3-4/5 BLE .  CHEST/LUNG: Clear upper lobes, decreased at bases.  No rales, rhonchi, wheezing, or rubs  HEART: Regular rate and rhythm; No murmurs, rubs, or gallops  ABDOMEN: Soft, Nontender, Nondistended; Bowel sounds present  EXTREMITIES:  2+ Peripheral Pulses, No clubbing, cyanosis, or edema  LYMPH: No lymphadenopathy noted  SKIN: Unstageable sacral DTI      LABS:  CBC Full  -  ( 23 Nov 2020 06:35 )  WBC Count : 9.57 K/uL  RBC Count : 3.34 M/uL  Hemoglobin : 9.5 g/dL  Hematocrit : 30.4 %  Platelet Count - Automated : 239 K/uL  Mean Cell Volume : 91.0 fl  Mean Cell Hemoglobin : 28.4 pg  Mean Cell Hemoglobin Concentration : 31.3 gm/dL  Auto Neutrophil # : x  Auto Lymphocyte # : x  Auto Monocyte # : x  Auto Eosinophil # : x  Auto Basophil # : x  Auto Neutrophil % : x  Auto Lymphocyte % : x  Auto Monocyte % : x  Auto Eosinophil % : x  Auto Basophil % : x    11-23    148<H>  |  115<H>  |  24<H>  ----------------------------<  67<L>  4.1   |  25  |  1.21    Ca    7.5<L>      23 Nov 2020 06:35  Phos  2.8     11-23  Mg     2.2     11-23    TPro  6.2  /  Alb  2.1<L>  /  TBili  2.8<H>  /  DBili  x   /  AST  48<H>  /  ALT  35  /  AlkPhos  162<H>  11-23              [  ]  DVT Prophylaxis  [  ]  Nutrition, Brand, Rate         Goal Rate        Abnormal Nutritional Status -  Malnutrition   Cachexia          RADIOLOGY & ADDITIONAL STUDIES:  ***        Goals of Care Discussion with Family/Proxy/Other   - see note from/family meeting : 11/18

## 2020-11-23 NOTE — PROGRESS NOTE ADULT - PROBLEM SELECTOR PLAN 5
Stage IV-unstageable  S/P sacral wound debridement  No active infection per Surgery `  f/u MRI r/o OM   Continue fluconazole as per ID.   con't Daptomycin (ordered 11/17) Monitor CK while on Dapto   Continue wound care as ordered.  Turn every 2 hours.  OOB daily.

## 2020-11-23 NOTE — PROGRESS NOTE ADULT - PROBLEM SELECTOR PLAN 9
DVT and GI prophylaxis  Continue rifaximin  Dapto for total of 10 days if negative OM . Otherwise, abx will be extended to total of 6 weeks.   Discharge planning likely to [facility] when medically optimized

## 2020-11-23 NOTE — CHART NOTE - NSCHARTNOTEFT_GEN_A_CORE
Assessment:   65yFemalePatient is a 65y old  Female who presents with a chief complaint of Hypotension (23 Nov 2020 09:26)      Factors impacting intake: [ ] none [ ] nausea  [ ] vomiting [ ] diarrhea [ ] constipation  [ ]chewing problems [ ] swallowing issues  [ x] other: spoke with patient concerning intake, reports poor intake of meals. some diarrhea. Taking oral supplements. Per RN, patient with fair intake. provide dysphagia 1 puree , nectar liquids, and Two papi HN and ensure pudding bid .     Diet Presciption: Diet, Dysphagia 1 Pureed-Nectar Consistency Fluid:   Supplement Feeding Modality:  Oral  Two Papi HN Cans or Servings Per Day:  3       Frequency:  Three Times a day  Ensure Pudding Cans or Servings Per Day:  2       Frequency:  Two Times a day (11-13-20 @ 13:46)    Intake: fair     Current Weight: none   % Weight Change    Pertinent Medications: MEDICATIONS  (STANDING):  ascorbic acid 500 milliGRAM(s) Oral daily  cefepime   IVPB      cefepime   IVPB 1000 milliGRAM(s) IV Intermittent every 24 hours  DAPTOmycin IVPB      DAPTOmycin IVPB 350 milliGRAM(s) IV Intermittent every 24 hours  dextrose 5% 1000 milliLiter(s) (50 mL/Hr) IV Continuous <Continuous>  enoxaparin Injectable 50 milliGRAM(s) SubCutaneous daily  fluconAZOLE IVPB 200 milliGRAM(s) IV Intermittent every 24 hours  fluconAZOLE IVPB      furosemide    Tablet 20 milliGRAM(s) Oral daily  multivitamin 1 Tablet(s) Oral daily  nystatin Powder 1 Application(s) Topical two times a day  pantoprazole   Suspension 40 milliGRAM(s) Enteral Tube daily  rifAXIMin 550 milliGRAM(s) Oral two times a day  zinc sulfate 220 milliGRAM(s) Oral daily    MEDICATIONS  (PRN):  acetaminophen   Tablet .. 650 milliGRAM(s) Oral every 6 hours PRN Temp greater or equal to 38C (100.4F)    Pertinent Labs: 11-23 Na148 mmol/L<H> Glu 67 mg/dL<L> K+ 4.1 mmol/L Cr  1.21 mg/dL BUN 24 mg/dL<H> 11-23 Phos 2.8 mg/dL 11-23 Alb 2.1 g/dL<L> 10-31 Chol --    LDL --    HDL --    Trig 87 mg/dL     CAPILLARY BLOOD GLUCOSE        Skin: coccyx-DTI     Estimated Needs:   [x ] no change since previous assessment  [ ] recalculated:     Previous Nutrition Diagnosis:   [ ] Inadequate Energy Intake [ ]Inadequate Oral Intake [ ] Excessive Energy Intake   [ ] Underweight [ ] Increased Nutrient Needs [ ] Overweight/Obesity   [ ] Altered GI Function [ ] Unintended Weight Loss [ ] Food & Nutrition Related Knowledge Deficit [x ] Malnutrition , severe      Nutrition Diagnosis is [x ] ongoing  [ ] resolved [ ] not applicable     New Nutrition Diagnosis: [ ] not applicable       Interventions:   Recommend  [ ] Change Diet To:  [ ] Nutrition Supplement  [ ] Nutrition Support  [x ] Other: provide dysphagia 1 puree , nectar liquids, and Two papi HN and ensure pudding bid . change multivitamin to multivitamin/minerals 1 tab/d, increase vitamin C to 500mg bid for healing . patient will tolerate diet with No swallowing difficulties     Monitoring and Evaluation:   [x ] PO intake [ x ] Tolerance to diet prescription [ x ] weights [ x ] labs[ x ] follow up per protocol  [ ] other: Assessment:   65yFemalePatient is a 65y old  Female who presents with a chief complaint of Hypotension (23 Nov 2020 09:26)      Factors impacting intake: [ ] none [ ] nausea  [ ] vomiting [ ] diarrhea [ ] constipation  [ ]chewing problems [ ] swallowing issues  [ x] other: spoke with patient concerning intake, reports poor intake of meals. some diarrhea. Taking oral supplements. Per RN, patient with fair intake. provide dysphagia 1 puree , nectar liquids, and Two papi HN tid and ensure pudding bid .     Diet Presciption: Diet, Dysphagia 1 Pureed-Nectar Consistency Fluid:   Supplement Feeding Modality:  Oral  Two Papi HN Cans or Servings Per Day:  3       Frequency:  Three Times a day  Ensure Pudding Cans or Servings Per Day:  2       Frequency:  Two Times a day (11-13-20 @ 13:46)    Intake: fair     Current Weight: none   % Weight Change    Pertinent Medications: MEDICATIONS  (STANDING):  ascorbic acid 500 milliGRAM(s) Oral daily  cefepime   IVPB      cefepime   IVPB 1000 milliGRAM(s) IV Intermittent every 24 hours  DAPTOmycin IVPB      DAPTOmycin IVPB 350 milliGRAM(s) IV Intermittent every 24 hours  dextrose 5% 1000 milliLiter(s) (50 mL/Hr) IV Continuous <Continuous>  enoxaparin Injectable 50 milliGRAM(s) SubCutaneous daily  fluconAZOLE IVPB 200 milliGRAM(s) IV Intermittent every 24 hours  fluconAZOLE IVPB      furosemide    Tablet 20 milliGRAM(s) Oral daily  multivitamin 1 Tablet(s) Oral daily  nystatin Powder 1 Application(s) Topical two times a day  pantoprazole   Suspension 40 milliGRAM(s) Enteral Tube daily  rifAXIMin 550 milliGRAM(s) Oral two times a day  zinc sulfate 220 milliGRAM(s) Oral daily    MEDICATIONS  (PRN):  acetaminophen   Tablet .. 650 milliGRAM(s) Oral every 6 hours PRN Temp greater or equal to 38C (100.4F)    Pertinent Labs: 11-23 Na148 mmol/L<H> Glu 67 mg/dL<L> K+ 4.1 mmol/L Cr  1.21 mg/dL BUN 24 mg/dL<H> 11-23 Phos 2.8 mg/dL 11-23 Alb 2.1 g/dL<L> 10-31 Chol --    LDL --    HDL --    Trig 87 mg/dL     CAPILLARY BLOOD GLUCOSE        Skin: coccyx-DTI     Estimated Needs:   [x ] no change since previous assessment  [ ] recalculated:     Previous Nutrition Diagnosis:   [ ] Inadequate Energy Intake [ ]Inadequate Oral Intake [ ] Excessive Energy Intake   [ ] Underweight [ ] Increased Nutrient Needs [ ] Overweight/Obesity   [ ] Altered GI Function [ ] Unintended Weight Loss [ ] Food & Nutrition Related Knowledge Deficit [x ] Malnutrition , severe      Nutrition Diagnosis is [x ] ongoing  [ ] resolved [ ] not applicable     New Nutrition Diagnosis: [ ] not applicable       Interventions:   Recommend  [ ] Change Diet To:  [ ] Nutrition Supplement  [ ] Nutrition Support  [x ] Other: provide dysphagia 1 puree , nectar liquids, and Two papi HN tid and ensure pudding bid . change multivitamin to multivitamin/minerals 1 tab/d, increase vitamin C to 500mg bid for healing . patient will tolerate diet with No swallowing difficulties     Monitoring and Evaluation:   [x ] PO intake [ x ] Tolerance to diet prescription [ x ] weights [ x ] labs[ x ] follow up per protocol  [ ] other:

## 2020-11-23 NOTE — PROGRESS NOTE ADULT - ASSESSMENT
65y Female from home, lives with daughter, ambulates with walker with PMH of recurrent SBO's, s/p exp lap, SB resection in 2015, ex lap, ALBINA in 2018, DVT, PE, on Xarelto, IVC filter, chronic leg swelling, and anasarca, came in to the ED due to hypotension during office visit. Patient was found to be bacteremic with bacteroids fragilis. Admitted in ICU due to hypotension and hypothermia. patient completed course of antibiotics . BCx X2 negative. Ammonia level elevated and patient refused NGT placement, mental status deteriorated and patient desaturated to high 70%, patient got intubated on 10/24 . Sputum was positive for MRSA and Stenotrophomonas. patient started on vancomycin and Levaquin. patient was given lactulose for high ammonia level. kidney function and liver function started to deteriorate and patient was leaning toward multi organ failure . high dose of Lasix started and patient was aggressively diuresed. Kidney function improved . Ammonia level dropped to less than 10.  Mental status improved and patient extubated on 11/3/2020. Patient was seen by wound specialist and decubitus ulcer debrided. patient had a drop in h/h s/p debridement . received 1 unit PRBC .   11/12  Transferred from ICU to SCU  `11/17  Midodrine d/c secondary to daptomycin being started.  11/18 Febrile, Tmax 101.5. Repeat BC sent.   11/19 Discontinue velazquez.   11/20 febrile overnight.   11/22 s/p 2 units PRBC for Hgb 6.5 65y Female from home, lives with daughter, ambulates with walker with PMH of recurrent SBO's, s/p exp lap, SB resection in 2015, ex lap, ALBINA in 2018, DVT, PE, on Xarelto, IVC filter, chronic leg swelling, and anasarca, came in to the ED due to hypotension during office visit. Patient was found to be bacteremic with bacteroids fragilis. Admitted in ICU due to hypotension and hypothermia. patient completed course of antibiotics . BCx X2 negative. Ammonia level elevated and patient refused NGT placement, mental status deteriorated and patient desaturated to high 70%, patient got intubated on 10/24 . Sputum was positive for MRSA and Stenotrophomonas. patient started on vancomycin and Levaquin. patient was given lactulose for high ammonia level. kidney function and liver function started to deteriorate and patient was leaning toward multi organ failure . high dose of Lasix started and patient was aggressively diuresed. Kidney function improved . Ammonia level dropped to less than 10.  Mental status improved and patient extubated on 11/3/2020. Patient was seen by wound specialist and decubitus ulcer debrided. patient had a drop in h/h s/p debridement . received 1 unit PRBC .   11/12  Transferred from ICU to SCU  `11/17  Midodrine d/c secondary to daptomycin being started.  11/18 Febrile, Tmax 101.5. Repeat BC sent.   11/19 Discontinue velazquez.   11/20 febrile overnight.   11/22 s/p 2 units PRBC for Hgb 6.4

## 2020-11-24 NOTE — PROGRESS NOTE ADULT - SUBJECTIVE AND OBJECTIVE BOX
Patient was seen and examined  Patient is a 65y old  Female who presents with a chief complaint of Hypotension (24 Nov 2020 08:49)      INTERVAL HPI/OVERNIGHT EVENTS:  T(C): 37 (11-24-20 @ 05:53), Max: 37.3 (11-23-20 @ 21:01)  HR: 96 (11-24-20 @ 05:53) (96 - 108)  BP: 126/84 (11-24-20 @ 05:53) (112/42 - 126/84)  RR: 16 (11-24-20 @ 05:53) (16 - 20)  SpO2: 100% (11-24-20 @ 05:53) (100% - 100%)  Wt(kg): --  I&O's Summary      LABS:                        9.9    9.41  )-----------( 269      ( 24 Nov 2020 06:38 )             31.5     11-24    145  |  115<H>  |  22<H>  ----------------------------<  75  3.8   |  25  |  1.11    Ca    7.9<L>      24 Nov 2020 06:38  Phos  2.8     11-24  Mg     2.1     11-24    TPro  6.3  /  Alb  2.0<L>  /  TBili  3.0<H>  /  DBili  x   /  AST  49<H>  /  ALT  32  /  AlkPhos  170<H>  11-24        CAPILLARY BLOOD GLUCOSE      POCT Blood Glucose.: 79 mg/dL (24 Nov 2020 06:33)  POCT Blood Glucose.: 87 mg/dL (24 Nov 2020 00:23)              MEDICATIONS  (STANDING):  ascorbic acid 500 milliGRAM(s) Oral two times a day  cefepime   IVPB      cefepime   IVPB 1000 milliGRAM(s) IV Intermittent every 24 hours  DAPTOmycin IVPB      DAPTOmycin IVPB 350 milliGRAM(s) IV Intermittent every 24 hours  dextrose 5% 1000 milliLiter(s) (50 mL/Hr) IV Continuous <Continuous>  enoxaparin Injectable 50 milliGRAM(s) SubCutaneous daily  fluconAZOLE IVPB 200 milliGRAM(s) IV Intermittent every 24 hours  fluconAZOLE IVPB      furosemide    Tablet 20 milliGRAM(s) Oral daily  multivitamin/minerals 1 Tablet(s) Oral daily  nystatin Powder 1 Application(s) Topical two times a day  pantoprazole   Suspension 40 milliGRAM(s) Enteral Tube daily  rifAXIMin 550 milliGRAM(s) Oral two times a day  zinc sulfate 220 milliGRAM(s) Oral daily    MEDICATIONS  (PRN):  acetaminophen   Tablet .. 650 milliGRAM(s) Oral every 6 hours PRN Temp greater or equal to 38C (100.4F)      RADIOLOGY & ADDITIONAL TESTS:    Imaging Personally Reviewed:  [ ] YES  [ ] NO    REVIEW OF SYSTEMS:  CONSTITUTIONAL: No fever, weight loss, or fatigue  EYES: No eye pain, visual disturbances, or discharge  ENMT:  No difficulty hearing, tinnitus, vertigo; No sinus or throat pain  NECK: No pain or stiffness  BREASTS: No pain, masses, or nipple discharge  RESPIRATORY: No cough, wheezing, chills or hemoptysis; No shortness of breath  CARDIOVASCULAR: No chest pain, palpitations, dizziness, or leg swelling  GASTROINTESTINAL: No abdominal or epigastric pain. No nausea, vomiting, or hematemesis; No diarrhea or constipation. No melena or hematochezia.  GENITOURINARY: No dysuria, frequency, hematuria, or incontinence  NEUROLOGICAL: No headaches, memory loss, loss of strength, numbness, or tremors  SKIN: No itching, burning, rashes, or lesions   LYMPH NODES: No enlarged glands  ENDOCRINE: No heat or cold intolerance; No hair loss  MUSCULOSKELETAL: No joint pain or swelling; No muscle, back, or extremity pain  PSYCHIATRIC: No depression, anxiety, mood swings, or difficulty sleeping  HEME/LYMPH: No easy bruising, or bleeding gums  ALLERY AND IMMUNOLOGIC: No hives or eczema      Consultant(s) Notes Reviewed:  [ x ] YES  [ ] NO    PHYSICAL EXAM:  GENERAL: NAD, well-groomed, well-developed  HEAD:  Atraumatic, Normocephalic  EYES: EOMI, PERRLA, conjunctiva and sclera clear  ENMT: No tonsillar erythema, exudates, or enlargement; Moist mucous membranes, Good dentition, No lesions  NECK: Supple, No JVD, Normal thyroid  NERVOUS SYSTEM:  Alert & Oriented X3, Good concentration; Motor Strength 5/5 B/L upper and lower extremities; DTRs 2+ intact and symmetric  CHEST/LUNG: Clear to percussion bilaterally; No rales, rhonchi, wheezing, or rubs  HEART: Regular rate and rhythm; No murmurs, rubs, or gallops  ABDOMEN: Soft, Nontender, Nondistended; Bowel sounds present  EXTREMITIES:  2+ Peripheral Pulses, No clubbing, cyanosis, or edema  LYMPH: No lymphadenopathy noted  SKIN: No rashes or lesions    Care Discussed with Consultants/Other Providers [ x] YES  [ ] NO

## 2020-11-24 NOTE — PROGRESS NOTE ADULT - ASSESSMENT
Patient is a 65y old  Female from home, lives with daughter, ambulates with walker with PMH of recurrent SBO's, s/p exp lap, SB resection in 2015, ex lap, ALBINA in 2018, DVT, PE, on Xarelto, IVC filter, chronic leg swelling, and anasarca, Now send in to the ER by her PCP, Dr. Ross, for evaluation of  generalized weakness and hypotension BP of 80/40 during office visit. On admission, she found to have no fever and BP was in lower normal range and no Leukocytosis. The CXR is clear and CT abd/pelvis consistent with Ileus. The ID consult requested to assist with evaluation of infectious etiology of episode of hypotension. Found to have Bacteroides bacteremia and now developed septic shock on Cefepime and Flagyl. Hence transferred to ICU. 10/20/20.    # Hypotension  at office- BP of 80/40 - resolved   #  Bacteroides fragilis Bacteremia - 10/13/20 - ? source most likely GI- Repeat Blood Cxs have NGTD 10/16/20  # Ileus  - h/o recurrent SBO and s/p EXp. LAp  # COVID 19 negative   # Septic shock ( Hypothermia + hypotensive)- transferred to ICU since requiring pressor- source GI, The CT abd/pelvis shows nonspecific enterocolitis, noninfectious inflammatory bowel disease, or ischemic bowel, along with ileus.   # Pneumonia- HCAP vs Aspiration - on CXR 10/24 and CT chest 10/21- sputum Cx grew Methicillin resistant Staphylococcus aureus  and Stenotrophomonas maltophilia.  # S/p extubated 11/9/20  # Infected sacral ulcer- s/p debridement and culture grew Enterococcus and Candida, sensitivity is pending  # Fever- 11/8 and 11/19- BCX NGTD 11/19 - The CXR shows Improvement in bilateral airspace disease with persistent bibasilar consolidation. She is s/p removal of velazquez catheter and passed TOV.      would recommend:    1. Monitor Temp. and c/w supportive care  2. Please obtain Imaging of pelvis to rule out osteomyelitis, if negative for OM then need Abx until 11/24/20, otherwise 6 weeks  3. Continue Daptomycin to cover VRE  and  Fluconazole to cover Candida in sacral wound culture  4. Monitor kidney function, is improving  5. Aspiration precaution  6. Continue Sacral Wound care  and Frequent Repositioning       Attending Attestation:    Spent more than 45 minutes on total encounter, more than 50 % of the visit was spent counseling and/or coordinating care by the Attending physician.   Patient is a 65y old  Female from home, lives with daughter, ambulates with walker with PMH of recurrent SBO's, s/p exp lap, SB resection in 2015, ex lap, ALBINA in 2018, DVT, PE, on Xarelto, IVC filter, chronic leg swelling, and anasarca, Now send in to the ER by her PCP, Dr. Ross, for evaluation of  generalized weakness and hypotension BP of 80/40 during office visit. On admission, she found to have no fever and BP was in lower normal range and no Leukocytosis. The CXR is clear and CT abd/pelvis consistent with Ileus. The ID consult requested to assist with evaluation of infectious etiology of episode of hypotension. Found to have Bacteroides bacteremia and now developed septic shock on Cefepime and Flagyl. Hence transferred to ICU. 10/20/20.    # Hypotension  at office- BP of 80/40 - resolved   #  Bacteroides fragilis Bacteremia - 10/13/20 - ? source most likely GI- Repeat Blood Cxs have NGTD 10/16/20  # Ileus  - h/o recurrent SBO and s/p EXp. LAp  # COVID 19 negative   # Septic shock ( Hypothermia + hypotensive)- transferred to ICU since requiring pressor- source GI, The CT abd/pelvis shows nonspecific enterocolitis, noninfectious inflammatory bowel disease, or ischemic bowel, along with ileus.   # Pneumonia- HCAP vs Aspiration - on CXR 10/24 and CT chest 10/21- sputum Cx grew Methicillin resistant Staphylococcus aureus  and Stenotrophomonas maltophilia.  # S/p extubated 11/9/20  # Infected sacral ulcer- s/p debridement and culture grew Enterococcus, VRE and Candida, sensitivity is pending- The MRI of pelvis shows  Osteomyelitis of the lower sacrum and coccyx with overlying cutaneous ulceration.   # Fever- 11/8 and 11/19- BCX NGTD 11/19 - The CXR shows Improvement in bilateral airspace disease with persistent bibasilar consolidation. She is s/p removal of velazquez catheter and passed TOV.      would recommend:    1. Monitor Temp. and c/w supportive care  2. Continue Daptomycin to cover VRE  and  Fluconazole to cover Candida in sacral wound culture X 6  weeks to treat Osteomyelitis  3. Monitor kidney function, is improving  4. Aspiration precaution  5. Continue Sacral Wound care  and Frequent Repositioning     d/w Dr. Evita Mohamud    Attending Attestation:    Spent more than 45 minutes on total encounter, more than 50 % of the visit was spent counseling and/or coordinating care by the Attending physician.

## 2020-11-24 NOTE — PROGRESS NOTE ADULT - PROBLEM SELECTOR PLAN 1
Secondary to bacteremia . Bacteroides fragilis per BC on 10/13  Sputum positive for MRSA and Stenotrophomas  Aspiration pneumonia vs HCAP   Repeat BC 10/16 and 11/19 NGTD   UC on 11/20 NGTD    Infected sacral ulcer- s/p debridement and culture grew Enterococcus (VRE) and Candida ( sensitivity is pending)  Leukocytosis resolved, Cdiff negative  on Dapto and Fluconazole  MRI sacrum/pelvis , r/o OM   Echo negative for endocarditis  F/u PAVAN , eval for endocarditis  Monitor CK weekly while on Dapto  velazquez  discontinued 11/19.   Permafit as needed.   ID Dr. Patricio following

## 2020-11-24 NOTE — PROGRESS NOTE ADULT - ASSESSMENT
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  BC grew bacteroides.    Problem/Recommendation - 1:  fever again  will do fever w/u  blood culture, CXR, stool c.diff  on antibiotics  Problem/Recommendation - 2:·  Problem: Anemia. ferritin, b12 folate normal, no hemolysis  most likley due to malnutrition from short bowel syndrome.she also had GIB multiple times before.   the H/H dropped over weekend and she had transfsuion  ?bleeding vs acute infection causing acute anemia  Problem/Recommendation - 3:·  Problem: Acute deep vein thrombosis (DVT) of other vein of upper extremity.  Recommendation: she has been on xarelto for 2 years.  DVT at left arm and left leg before.  in all CT scan i did not see report of IVC or hepatic vein thrombosis.  off xarelto and lovenox due to elevated PT/PTT. elevated PT/PTT due to malnutrition and antibiotics causing VITK def  now it is mostly recovered  it begins to rise again, correctable by mixing study  probably due to liver failure this time  her liver function recovered and she was extubated.  on sq lovenox

## 2020-11-24 NOTE — PROGRESS NOTE ADULT - SUBJECTIVE AND OBJECTIVE BOX
MARIBETH PADILLA    SCU progress note    INTERVAL HPI/OVERNIGHT EVENTS: ***Pt seen and examined this morning. Pt reports feeling well, eating better, denies further diarrhea.     DNR [X ]   DNI  [  ] DNR / trial intubation/ trial feeding tube / trial IV fluids / use abx.     Covid - 19 PCR: not detected 11/17  The 4Ms    What Matters Most: see GOC  Age appropriate Medications/Screen for High Risk Medication: Yes  Mentation: see CAM below  Mobility: defer to physical exam    The Confusion Assessment Method (CAM) Diagnostic Algorithm     1: Acute Onset or Fluctuating Course  - Is there evidence of an acute change in mental status from the patient’s baseline? Did the (abnormal) behavior  fluctuate during the day, that is, tend to come and go, or increase and decrease in severity?  [ ] YES [x ] NO     2: Inattention  - Did the patient have difficulty focusing attention, being easily distractible, or having difficulty keeping track of what was being said?  [ ] YES [x ] NO     3: Disorganized thinking  -Was the patient’s thinking disorganized or incoherent, such as rambling or irrelevant conversation, unclear or illogical flow of ideas, or unpredictable switching from subject to subject?  [ ] YES [x ] NO    4: Altered Level of consciousness?  [ ] YES [x ] NO    The diagnosis of delirium by CAM requires the presence of features 1 and 2 and either 3 or 4.    PRESSORS: [ ] YES [x)NO  cefepime   IVPB      cefepime   IVPB 1000 milliGRAM(s) IV Intermittent every 24 hours  DAPTOmycin IVPB      DAPTOmycin IVPB 350 milliGRAM(s) IV Intermittent every 24 hours  fluconAZOLE IVPB 200 milliGRAM(s) IV Intermittent every 24 hours  fluconAZOLE IVPB      rifAXIMin 550 milliGRAM(s) Oral two times a day    Cardiovascular:  Echo 11/23: Normal Left Ventricular Systolic Function,  (EF = 50-55%) . Normal diastolic function.    furosemide    Tablet 20 milliGRAM(s) Oral daily    Pulmonary:    Hematalogic:  enoxaparin Injectable 50 milliGRAM(s) SubCutaneous daily    Other:  acetaminophen   Tablet .. 650 milliGRAM(s) Oral every 6 hours PRN  ascorbic acid 500 milliGRAM(s) Oral two times a day  dextrose 5% 1000 milliLiter(s) IV Continuous <Continuous>  multivitamin/minerals 1 Tablet(s) Oral daily  nystatin Powder 1 Application(s) Topical two times a day  pantoprazole   Suspension 40 milliGRAM(s) Enteral Tube daily  zinc sulfate 220 milliGRAM(s) Oral daily    acetaminophen   Tablet .. 650 milliGRAM(s) Oral every 6 hours PRN  ascorbic acid 500 milliGRAM(s) Oral two times a day  cefepime   IVPB      cefepime   IVPB 1000 milliGRAM(s) IV Intermittent every 24 hours  DAPTOmycin IVPB      DAPTOmycin IVPB 350 milliGRAM(s) IV Intermittent every 24 hours  dextrose 5% 1000 milliLiter(s) IV Continuous <Continuous>  enoxaparin Injectable 50 milliGRAM(s) SubCutaneous daily  fluconAZOLE IVPB 200 milliGRAM(s) IV Intermittent every 24 hours  fluconAZOLE IVPB      furosemide    Tablet 20 milliGRAM(s) Oral daily  multivitamin/minerals 1 Tablet(s) Oral daily  nystatin Powder 1 Application(s) Topical two times a day  pantoprazole   Suspension 40 milliGRAM(s) Enteral Tube daily  rifAXIMin 550 milliGRAM(s) Oral two times a day  zinc sulfate 220 milliGRAM(s) Oral daily    Drug Dosing Weight  Height (cm): 167.6 (21 Oct 2020 02:26)  Weight (kg): 56.2 (21 Oct 2020 02:26)  BMI (kg/m2): 20 (21 Oct 2020 02:26)  BSA (m2): 1.63 (21 Oct 2020 02:26)    CENTRAL LINE: [ ] YES [ x] NO  LOCATION:   DATE INSERTED:  REMOVE: [ ] YES [ ] NO  EXPLAIN:    TREJO: [ ] YES [ x] NO    DATE INSERTED:  REMOVE:  [ ] YES [ ] NO  EXPLAIN:    PAST MEDICAL & SURGICAL HISTORY:  Anasarca    Pulmonary embolism    DVT (deep venous thrombosis)    SBO (small bowel obstruction)    H/O exploratory laparotomy                      PHYSICAL EXAM:    GENERAL:cachectic,  NAD,  HEAD:  Atraumatic, Normocephalic  EYES: EOMI, PERRLA, conjunctiva and sclera clear  ENMT: No tonsillar erythema, exudates, or enlargement; Moist mucous membranes,No lesions  NECK: Supple, No JVD, Normal thyroid  NERVOUS SYSTEM:  Alert & Oriented X3, Good concentration; Motor Strength 5/5 B/L upper and lower extremities; DTRs 2+ intact and symmetric  CHEST/LUNG: Unlabored . Clear upper lobes. Decreased at bases. No rales, rhonchi, wheezing, or rubs  HEART: Regular rate and rhythm; No murmurs, rubs, or gallops  ABDOMEN: Soft, Nontender, Nondistended; Bowel sounds present  EXTREMITIES:  2+ Peripheral Pulses, No clubbing, cyanosis, or edema  LYMPH: No lymphadenopathy noted  SKIN: Unstageable sacral DTI      LABS:  CBC Full  -  ( 24 Nov 2020 06:38 )  WBC Count : 9.41 K/uL  RBC Count : 3.45 M/uL  Hemoglobin : 9.9 g/dL  Hematocrit : 31.5 %  Platelet Count - Automated : 269 K/uL  Mean Cell Volume : 91.3 fl  Mean Cell Hemoglobin : 28.7 pg  Mean Cell Hemoglobin Concentration : 31.4 gm/dL  Auto Neutrophil # : x  Auto Lymphocyte # : x  Auto Monocyte # : x  Auto Eosinophil # : x  Auto Basophil # : x  Auto Neutrophil % : x  Auto Lymphocyte % : x  Auto Monocyte % : x  Auto Eosinophil % : x  Auto Basophil % : x    11-24    145  |  115<H>  |  22<H>  ----------------------------<  75  3.8   |  25  |  1.11    Ca    7.9<L>      24 Nov 2020 06:38  Phos  2.8     11-24  Mg     2.1     11-24    TPro  6.3  /  Alb  2.0<L>  /  TBili  3.0<H>  /  DBili  x   /  AST  49<H>  /  ALT  32  /  AlkPhos  170<H>  11-24              [  ]  DVT Prophylaxis  [  ]  Nutrition, Brand, Rate         Goal Rate        Abnormal Nutritional Status -  Malnutrition   Cachexia          RADIOLOGY & ADDITIONAL STUDIES:  ***    Goals of Care Discussion with Family/Proxy/Other   - see note from/family meeting : 11/18

## 2020-11-24 NOTE — PROGRESS NOTE ADULT - PROBLEM SELECTOR PLAN 2
Echo shows improved EF   Does not appear fluid overload  Lasix po 20mg with hold parameters in setting of poor po intake.   Continue supportive care.

## 2020-11-24 NOTE — PROGRESS NOTE ADULT - PROBLEM SELECTOR PLAN 7
Encourage oral intake.  Oral intake remains poor.  Assist with feedings.  Continue po supplements  c/w supplements:  MVI with  minerals, Nadja C 500mg BID  Appreciate Nutrition input

## 2020-11-24 NOTE — PROGRESS NOTE ADULT - SUBJECTIVE AND OBJECTIVE BOX
low grade temp  no sob  no pain  no diarrhea      ROS:  Negative except for:    MEDICATIONS  (STANDING):  ascorbic acid 500 milliGRAM(s) Oral two times a day  cefepime   IVPB      cefepime   IVPB 1000 milliGRAM(s) IV Intermittent every 24 hours  DAPTOmycin IVPB      DAPTOmycin IVPB 350 milliGRAM(s) IV Intermittent every 24 hours  dextrose 5% 1000 milliLiter(s) (50 mL/Hr) IV Continuous <Continuous>  enoxaparin Injectable 50 milliGRAM(s) SubCutaneous daily  fluconAZOLE IVPB 200 milliGRAM(s) IV Intermittent every 24 hours  fluconAZOLE IVPB      furosemide    Tablet 20 milliGRAM(s) Oral daily  multivitamin/minerals 1 Tablet(s) Oral daily  nystatin Powder 1 Application(s) Topical two times a day  pantoprazole   Suspension 40 milliGRAM(s) Enteral Tube daily  rifAXIMin 550 milliGRAM(s) Oral two times a day  zinc sulfate 220 milliGRAM(s) Oral daily    MEDICATIONS  (PRN):  acetaminophen   Tablet .. 650 milliGRAM(s) Oral every 6 hours PRN Temp greater or equal to 38C (100.4F)      Allergies    No Known Allergies    Intolerances        Vital Signs Last 24 Hrs  T(C): 37 (24 Nov 2020 05:53), Max: 37.3 (23 Nov 2020 21:01)  T(F): 98.6 (24 Nov 2020 05:53), Max: 99.1 (23 Nov 2020 21:01)  HR: 96 (24 Nov 2020 05:53) (96 - 108)  BP: 126/84 (24 Nov 2020 05:53) (112/42 - 126/84)  BP(mean): 93 (24 Nov 2020 05:53) (93 - 93)  RR: 16 (24 Nov 2020 05:53) (16 - 20)  SpO2: 100% (24 Nov 2020 05:53) (100% - 100%)    PHYSICAL EXAM  General: adult in NAD  HEENT: clear oropharynx, anicteric sclera, pink conjunctiva  Neck: supple  CV: normal S1/S2 with no murmur rubs or gallops  Lungs: positive air movement b/l ant lungs,clear to auscultation, no wheezes, no rales  Abdomen: soft non-tender non-distended, no hepatosplenomegaly  Ext: no clubbing cyanosis or edema  Skin: no rashes and no petechiae  Neuro: alert and oriented X 4, no focal deficits  LABS:                          9.9    9.41  )-----------( 269      ( 24 Nov 2020 06:38 )             31.5         Mean Cell Volume : 91.3 fl  Mean Cell Hemoglobin : 28.7 pg  Mean Cell Hemoglobin Concentration : 31.4 gm/dL  Auto Neutrophil # : x  Auto Lymphocyte # : x  Auto Monocyte # : x  Auto Eosinophil # : x  Auto Basophil # : x  Auto Neutrophil % : x  Auto Lymphocyte % : x  Auto Monocyte % : x  Auto Eosinophil % : x  Auto Basophil % : x    Serial CBC  Hematocrit 31.5  Hemoglobin 9.9  Plat 269  RBC 3.45  WBC 9.41  Serial CBC  Hematocrit 30.4  Hemoglobin 9.5  Plat 239  RBC 3.34  WBC 9.57  Serial CBC  Hematocrit 20.5  Hemoglobin 6.4  Plat 243  RBC 2.21  WBC 11.57  Serial CBC  Hematocrit 21.7  Hemoglobin 6.7  Plat 248  RBC 2.32  WBC 11.54  Serial CBC  Hematocrit 24.6  Hemoglobin 7.5  Plat 245  RBC 2.61  WBC 11.27    11-24    145  |  115<H>  |  22<H>  ----------------------------<  75  3.8   |  25  |  1.11    Ca    7.9<L>      24 Nov 2020 06:38  Phos  2.8     11-24  Mg     2.1     11-24    TPro  6.3  /  Alb  2.0<L>  /  TBili  3.0<H>  /  DBili  x   /  AST  49<H>  /  ALT  32  /  AlkPhos  170<H>  11-24                    BLOOD SMEAR INTERPRETATION:       RADIOLOGY & ADDITIONAL STUDIES:

## 2020-11-24 NOTE — PROGRESS NOTE ADULT - SUBJECTIVE AND OBJECTIVE BOX
Patient is seen and examined at the bed side, is afebrile now.  She has no new complaints.  The WBC count is normal.      REVIEW OF SYSTEMS: All other review systems are negative      ALLERGIES: No Known Allergies        Vital Signs Last 24 Hrs  T(C): 37 (24 Nov 2020 05:53), Max: 37.3 (23 Nov 2020 21:01)  T(F): 98.6 (24 Nov 2020 05:53), Max: 99.1 (23 Nov 2020 21:01)  HR: 96 (24 Nov 2020 05:53) (96 - 108)  BP: 126/84 (24 Nov 2020 05:53) (116/73 - 126/84)  BP(mean): 93 (24 Nov 2020 05:53) (93 - 93)  RR: 16 (24 Nov 2020 05:53) (16 - 17)  SpO2: 100% (24 Nov 2020 05:53) (100% - 100%)        PHYSICAL EXAM:  GENERAL: Not in distress, on oxygen via NC  CHEST/LUNG: Not using accessory muscles   HEART: s1 and s2 present  ABDOMEN:  Nontender and  Nondistended  EXTREMITIES: B/L UE edematous improved  CNS: Awake and Alert         LABS:                        9.9    9.41  )-----------( 269      ( 24 Nov 2020 06:38 )             31.5                           9.4    13.14 )-----------( 149      ( 14 Nov 2020 07:58 )             28.7       11-24    145  |  115<H>  |  22<H>  ----------------------------<  75  3.8   |  25  |  1.11    Ca    7.9<L>      24 Nov 2020 06:38  Phos  2.8     11-24  Mg     2.1     11-24    TPro  6.3  /  Alb  2.0<L>  /  TBili  3.0<H>  /  DBili  x   /  AST  49<H>  /  ALT  32  /  AlkPhos  170<H>  11-24    11-23    148<H>  |  115<H>  |  24<H>  ----------------------------<  67<L>  4.1   |  25  |  1.21    Ca    7.5<L>      23 Nov 2020 06:35  Phos  2.8     11-23  Mg     2.2     11-23    TPro  6.2  /  Alb  2.1<L>  /  TBili  2.8<H>  /  DBili  x   /  AST  48<H>  /  ALT  35  /  AlkPhos  162<H>  11-23 11-06    138  |  104  |  37<H>  ----------------------------<  81  3.9   |  25  |  2.37<H>    Ca    8.1<L>      06 Nov 2020 06:20  Phos  3.4     11-06  Mg     2.0     11-06    TPro  5.1<L>  /  Alb  2.8<L>  /  TBili  9.2<H>  /  DBili  x   /  AST  233<H>  /  ALT  99<H>  /  AlkPhos  96  11-06      Vancomycin Level, Trough (11.07.20 @ 21:49)   Vancomycin Level, Trough: 16.1:     Vancomycin Level, Trough (11.07.20 @ 07:08)   Vancomycin Level, Trough: 19.5    vanVancomycin Level, Trough (11.06.20 @ 06:20)   Vancomycin Level, Trough: 19.9:         MEDICATIONS  (STANDING):    ascorbic acid 500 milliGRAM(s) Oral two times a day  cefepime   IVPB      cefepime   IVPB 1000 milliGRAM(s) IV Intermittent every 24 hours  dextrose 10% + sodium chloride 0.45%. 1000 milliLiter(s) (50 mL/Hr) IV Continuous <Continuous>  enoxaparin Injectable 50 milliGRAM(s) SubCutaneous daily  fluconAZOLE IVPB 200 milliGRAM(s) IV Intermittent every 24 hours  fluconAZOLE IVPB      furosemide    Tablet 20 milliGRAM(s) Oral daily  multivitamin/minerals 1 Tablet(s) Oral daily  nystatin Powder 1 Application(s) Topical two times a day  pantoprazole   Suspension 40 milliGRAM(s) Enteral Tube daily  rifAXIMin 550 milliGRAM(s) Oral two times a day  zinc sulfate 220 milliGRAM(s) Oral daily      RADIOLOGY & ADDITIONAL TESTS:    11/19/20 : Xray Chest 1 View- PORTABLE-Urgent (Xray Chest 1 View- PORTABLE-Urgent .) (11.19.20 @ 23:53): Improvement in bilateral airspace disease with persistent bibasilar consolidation and small effusions.    11/4/20 : Xray Chest 1 View- PORTABLE-Routine (Xray Chest 1 View- PORTABLE-Routine in AM.) (11.04.20 @ 10:59) Reexpansion of the left lung with residual left pleural effusion and/or infiltrate.  Right pulmonary edema unchanged.  Tubes and catheters in satisfactory position.      10/25/20: Xray Chest 1 View- PORTABLE-Routine (Xray Chest 1 View- PORTABLE-Routine in AM.) (10.25.20 @ 09:21) Frontal expiratory view of the chest shows the heart to be similar in size. Endotracheal tube, right jugular line and feeding tube remain present. The lungs show similar left upper lobe infiltrate with progression of right perihilar infiltrate. Pleural effusions are similar. There is no evidence of pneumothorax.    10/21/20 : CT Abdomen and Pelvis w/ Oral Cont and w/ IV Cont (10.21.20 @ 15:59) Mural thickening of the left colon and rectum. Liquid stool in the colon. Apparent segmental mural thickening of the mid to distal small bowel and the proximal duodenum. Findings may represent nonspecific enterocolitis, noninfectious inflammatory bowel disease, or ischemic bowel. Clinical correlation is recommended. The celiac axis artery, SMA, and KIAN are patent without stenosis.    Dilatation of the mid small bowel is again noted. Oral contrast has reached the terminal ileum. Findings may represent small bowel obstruction, or ileus related to nonspecific enterocolitis.   Cholelithiasis. Moderate to large ascites in the abdomen, increased since the previous examination. 5 mm nonspecific noncalcified left upper lobe lung nodule; if the patient's is in the high risk category (i.e. smoker), follow-up chest CT may be pursued in 12 months to ensure stability. Combination of atelectasis and consolidation in the left lower lobe.  Possible 1.0 cm hypodense lesion in the left lobe of the thyroid. Thyroid ultrasound may be pursued for further evaluation.   Mild bilateral pleural effusions.  Aging determinate compression fracture at T5 vertebra.          MICROBIOLOGY DATA:    Urine Microscopic-Add On (NC) (11.20.20 @ 04:12)   Red Blood Cell - Urine: 5-10 /HPF   White Blood Cell - Urine: 11-25 /HPF   Calcium Oxalate Crystals: Few /HPF   Bacteria: Moderate /HPF   Comment - Urine: few amorphous urates   Epithelial Cells: Few /HPF     Culture - Blood (11.19.20 @ 10:19)   Specimen Source: .Blood Blood-Peripheral   Culture Results: No growth to date.     Culture - Blood (11.19.20 @ 10:19)   Specimen Source: .Blood Blood-Peripheral   Culture Results: No growth to date.     Culture - Surgical Swab (11.12.20 @ 05:01)   Specimen Source: .Surgical Swab Sacral decub   Culture Results:  Few Enterococcus faecium Susceptibility to follow.   Rare Candida albicans "Susceptibilities not performed"     Culture - Sputum . (10.26.20 @ 00:26)   - Ampicillin/Sulbactam: R <=8/4   - Cefazolin: R >16   - Ceftazidime: I 16   - Clindamycin: R >4   - Erythromycin: R >4   - Gentamicin: S <=1 Should not be used as monotherapy   - Levofloxacin: S <=0.5   - Linezolid: S 2   - Oxacillin: R >2   - Penicillin: R >8   - RIF- Rifampin: S <=1 Should not be used as monotherapy   - Tetra/Doxy: S <=1   - Trimethoprim/Sulfamethoxazole: S <=0.5/9.5   - Trimethoprim/Sulfamethoxazole: S <=0.5/9.5   - Vancomycin: S 1     MRSA/MSSA PCR (10.21.20 @ 09:41)   MRSA PCR Result.: Detected:       Culture - Blood (10.20.20 @ 22:09)   Specimen Source: .Blood Blood   Culture Results:  No growth to date.     Culture - Blood (10.20.20 @ 22:09)   Specimen Source: .Blood Blood   Culture Results:   No growth to date.     Culture - Blood in AM (10.16.20 @ 10:13)   Specimen Source: .Blood Blood-Peripheral   Culture Results: No growth to date.     Culture - Blood in AM (10.16.20 @ 10:13)   Specimen Source: .Blood Blood-Peripheral   Culture Results: No growth to date.     Culture - Blood (10.13.20 @ 22:23)   Growth in anaerobic bottle: Gram Negative Rods   Specimen Source: .Blood Blood-Peripheral   Organism: Blood Culture PCR   Culture Results: Growth in anaerobic bottle: Bacteroides fragilis   "Susceptibilities not performed"     Culture - Blood (10.13.20 @ 22:23)   Specimen Source: .Blood Blood-Peripheral   Culture Results:   No growth to date.            Patient is seen and examined at the bed side, is afebrile.  She has no new complaints.  The MRI of pelvis shows  Osteomyelitis of the lower sacrum and coccyx with overlying cutaneous ulceration.       REVIEW OF SYSTEMS: All other review systems are negative      ALLERGIES: No Known Allergies        Vital Signs Last 24 Hrs  T(C): 37 (24 Nov 2020 05:53), Max: 37.3 (23 Nov 2020 21:01)  T(F): 98.6 (24 Nov 2020 05:53), Max: 99.1 (23 Nov 2020 21:01)  HR: 96 (24 Nov 2020 05:53) (96 - 108)  BP: 126/84 (24 Nov 2020 05:53) (116/73 - 126/84)  BP(mean): 93 (24 Nov 2020 05:53) (93 - 93)  RR: 16 (24 Nov 2020 05:53) (16 - 17)  SpO2: 100% (24 Nov 2020 05:53) (100% - 100%)        PHYSICAL EXAM:  GENERAL: Not in distress, on oxygen via NC  CHEST/LUNG: Not using accessory muscles   HEART: s1 and s2 present  ABDOMEN:  Nontender and  Nondistended  EXTREMITIES: B/L UE edematous improved  CNS: Awake and Alert         LABS:                        9.9    9.41  )-----------( 269      ( 24 Nov 2020 06:38 )             31.5                           9.4    13.14 )-----------( 149      ( 14 Nov 2020 07:58 )             28.7       11-24    145  |  115<H>  |  22<H>  ----------------------------<  75  3.8   |  25  |  1.11    Ca    7.9<L>      24 Nov 2020 06:38  Phos  2.8     11-24  Mg     2.1     11-24    TPro  6.3  /  Alb  2.0<L>  /  TBili  3.0<H>  /  DBili  x   /  AST  49<H>  /  ALT  32  /  AlkPhos  170<H>  11-24    11-23    148<H>  |  115<H>  |  24<H>  ----------------------------<  67<L>  4.1   |  25  |  1.21    Ca    7.5<L>      23 Nov 2020 06:35  Phos  2.8     11-23  Mg     2.2     11-23    TPro  6.2  /  Alb  2.1<L>  /  TBili  2.8<H>  /  DBili  x   /  AST  48<H>  /  ALT  35  /  AlkPhos  162<H>  11-23      11-06    138  |  104  |  37<H>  ----------------------------<  81  3.9   |  25  |  2.37<H>    Ca    8.1<L>      06 Nov 2020 06:20  Phos  3.4     11-06  Mg     2.0     11-06    TPro  5.1<L>  /  Alb  2.8<L>  /  TBili  9.2<H>  /  DBili  x   /  AST  233<H>  /  ALT  99<H>  /  AlkPhos  96  11-06      Vancomycin Level, Trough (11.07.20 @ 21:49)   Vancomycin Level, Trough: 16.1:     Vancomycin Level, Trough (11.07.20 @ 07:08)   Vancomycin Level, Trough: 19.5    vanVancomycin Level, Trough (11.06.20 @ 06:20)   Vancomycin Level, Trough: 19.9:         MEDICATIONS  (STANDING):    ascorbic acid 500 milliGRAM(s) Oral two times a day  cefepime   IVPB      cefepime   IVPB 1000 milliGRAM(s) IV Intermittent every 24 hours  dextrose 10% + sodium chloride 0.45%. 1000 milliLiter(s) (50 mL/Hr) IV Continuous <Continuous>  enoxaparin Injectable 50 milliGRAM(s) SubCutaneous daily  fluconAZOLE IVPB 200 milliGRAM(s) IV Intermittent every 24 hours  fluconAZOLE IVPB      furosemide    Tablet 20 milliGRAM(s) Oral daily  multivitamin/minerals 1 Tablet(s) Oral daily  nystatin Powder 1 Application(s) Topical two times a day  pantoprazole   Suspension 40 milliGRAM(s) Enteral Tube daily  rifAXIMin 550 milliGRAM(s) Oral two times a day  zinc sulfate 220 milliGRAM(s) Oral daily      RADIOLOGY & ADDITIONAL TESTS:      11/24/20 : MR Pelvis Bony Only No Cont (11.24.20 @ 18:02) : Osteomyelitis of the lower sacrum and coccyx with overlying cutaneous ulceration,      11/19/20 : Xray Chest 1 View- PORTABLE-Urgent (Xray Chest 1 View- PORTABLE-Urgent .) (11.19.20 @ 23:53): Improvement in bilateral airspace disease with persistent bibasilar consolidation and small effusions.    11/4/20 : Xray Chest 1 View- PORTABLE-Routine (Xray Chest 1 View- PORTABLE-Routine in AM.) (11.04.20 @ 10:59) Reexpansion of the left lung with residual left pleural effusion and/or infiltrate.  Right pulmonary edema unchanged.  Tubes and catheters in satisfactory position.      10/25/20: Xray Chest 1 View- PORTABLE-Routine (Xray Chest 1 View- PORTABLE-Routine in AM.) (10.25.20 @ 09:21) Frontal expiratory view of the chest shows the heart to be similar in size. Endotracheal tube, right jugular line and feeding tube remain present. The lungs show similar left upper lobe infiltrate with progression of right perihilar infiltrate. Pleural effusions are similar. There is no evidence of pneumothorax.    10/21/20 : CT Abdomen and Pelvis w/ Oral Cont and w/ IV Cont (10.21.20 @ 15:59) Mural thickening of the left colon and rectum. Liquid stool in the colon. Apparent segmental mural thickening of the mid to distal small bowel and the proximal duodenum. Findings may represent nonspecific enterocolitis, noninfectious inflammatory bowel disease, or ischemic bowel. Clinical correlation is recommended. The celiac axis artery, SMA, and KINA are patent without stenosis.    Dilatation of the mid small bowel is again noted. Oral contrast has reached the terminal ileum. Findings may represent small bowel obstruction, or ileus related to nonspecific enterocolitis.   Cholelithiasis. Moderate to large ascites in the abdomen, increased since the previous examination. 5 mm nonspecific noncalcified left upper lobe lung nodule; if the patient's is in the high risk category (i.e. smoker), follow-up chest CT may be pursued in 12 months to ensure stability. Combination of atelectasis and consolidation in the left lower lobe.  Possible 1.0 cm hypodense lesion in the left lobe of the thyroid. Thyroid ultrasound may be pursued for further evaluation.   Mild bilateral pleural effusions.  Aging determinate compression fracture at T5 vertebra.          MICROBIOLOGY DATA:    Urine Microscopic-Add On (NC) (11.20.20 @ 04:12)   Red Blood Cell - Urine: 5-10 /HPF   White Blood Cell - Urine: 11-25 /HPF   Calcium Oxalate Crystals: Few /HPF   Bacteria: Moderate /HPF   Comment - Urine: few amorphous urates   Epithelial Cells: Few /HPF     Culture - Blood (11.19.20 @ 10:19)   Specimen Source: .Blood Blood-Peripheral   Culture Results: No growth to date.     Culture - Surgical Swab (11.12.20 @ 05:01)   - Ampicillin: R >8 Predicts results to ampicillin/sulbactam, amoxacillin-clavulanate and piperacillin-tazobactam.   - Levofloxacin: R >4   - Linezolid: S 1   - Tetra/Doxy: R >8   - Vancomycin: R >16   Specimen Source: .Surgical Swab Sacral decub   Culture Results:   Few Enterococcus faecium (vancomycin resistant)   Rare Candida albicans "Susceptibilities not performed"   Organism Identification: Enterococcus faecium (vancomycin resistant)   Organism: Enterococcus faecium (vancomycin resistant)   Method Type: STEWART     Culture - Surgical Swab (11.12.20 @ 05:01)   Specimen Source: .Surgical Swab Sacral decub   Culture Results:  Few Enterococcus faecium Susceptibility to follow.   Rare Candida albicans "Susceptibilities not performed"     Culture - Sputum . (10.26.20 @ 00:26)   - Ampicillin/Sulbactam: R <=8/4   - Cefazolin: R >16   - Ceftazidime: I 16   - Clindamycin: R >4   - Erythromycin: R >4   - Gentamicin: S <=1 Should not be used as monotherapy   - Levofloxacin: S <=0.5   - Linezolid: S 2   - Oxacillin: R >2   - Penicillin: R >8   - RIF- Rifampin: S <=1 Should not be used as monotherapy   - Tetra/Doxy: S <=1   - Trimethoprim/Sulfamethoxazole: S <=0.5/9.5   - Trimethoprim/Sulfamethoxazole: S <=0.5/9.5   - Vancomycin: S 1     MRSA/MSSA PCR (10.21.20 @ 09:41)   MRSA PCR Result.: Detected:       Culture - Blood (10.20.20 @ 22:09)   Specimen Source: .Blood Blood   Culture Results:  No growth to date.     Culture - Blood (10.20.20 @ 22:09)   Specimen Source: .Blood Blood   Culture Results:   No growth to date.     Culture - Blood in AM (10.16.20 @ 10:13)   Specimen Source: .Blood Blood-Peripheral   Culture Results: No growth to date.     Culture - Blood in AM (10.16.20 @ 10:13)   Specimen Source: .Blood Blood-Peripheral   Culture Results: No growth to date.     Culture - Blood (10.13.20 @ 22:23)   Growth in anaerobic bottle: Gram Negative Rods   Specimen Source: .Blood Blood-Peripheral   Organism: Blood Culture PCR   Culture Results: Growth in anaerobic bottle: Bacteroides fragilis   "Susceptibilities not performed"     Culture - Blood (10.13.20 @ 22:23)   Specimen Source: .Blood Blood-Peripheral   Culture Results:   No growth to date.

## 2020-11-24 NOTE — PROGRESS NOTE ADULT - NUTRITIONAL ASSESSMENT
This patient has been assessed with a concern for Malnutrition and has been determined to have a diagnosis/diagnoses of Severe protein-calorie malnutrition.    This patient is being managed with:   Diet NPO after Midnight-     NPO Start Date: 24-Nov-2020   NPO Start Time: 23:59  Entered: Nov 24 2020  6:31PM    Diet Dysphagia 1 Pureed-Nectar Consistency Fluid-  Supplement Feeding Modality:  Oral  Two Papi HN Cans or Servings Per Day:  3       Frequency:  Three Times a day  Ensure Pudding Cans or Servings Per Day:  2       Frequency:  Two Times a day  Entered: Nov 13 2020  1:47PM

## 2020-11-25 NOTE — PROGRESS NOTE ADULT - PROBLEM SELECTOR PLAN 5
EXAM DESCRIPTION:  TIBIA FIBULA LEFT



COMPLETED DATE/TIME:  7/31/2018 10:08 pm



REASON FOR STUDY:  pain



COMPARISON:  None.



NUMBER OF VIEWS:  Two views.



TECHNIQUE:  Two radiographic images acquired of the left tibia and fibula to include the knee and ank
le in at least one projection.



LIMITATIONS:  None.



FINDINGS:  MINERALIZATION: Normal.

BONES: No acute fracture or dislocation.  No worrisome bone lesions.

SOFT TISSUES: No obvious swelling or foreign body.

OTHER: No other significant finding.



IMPRESSION:  NEGATIVE STUDY OF THE LEFT TIBIA AND FIBULA. NO RADIOGRAPHIC EVIDENCE OF ACUTE INJURY.



TECHNICAL DOCUMENTATION:  JOB ID:  [id number]

 2011 [employer]- All Rights Reserved



Reading location - IP/workstation name: [email or web address] Stage IV-unstageable  S/P sacral wound debridement  No active infection per Surgery `  f/u MRI r/o OM   Continue fluconazole as per ID.   con't Daptomycin (ordered 11/17) Monitor CK while on Dapto   Continue wound care as ordered.  Turn every 2 hours.  OOB daily.

## 2020-11-25 NOTE — PROGRESS NOTE ADULT - PROBLEM SELECTOR PLAN 9
DVT and GI prophylaxis  Continue rifaximin  Dapto  will be extended to total of 6 weeks.   PICC in IR likely on Friday. Hold Lovenox on Friday.   Discharge planning likely to [facility] when medically optimized

## 2020-11-25 NOTE — PROGRESS NOTE ADULT - ASSESSMENT
65y Female from home, lives with daughter, ambulates with walker with PMH of recurrent SBO's, s/p exp lap, SB resection in 2015, ex lap, ALBIAN in 2018, DVT, PE, on Xarelto, IVC filter, chronic leg swelling, and anasarca, came in to the ED due to hypotension during office visit. Patient was found to be bacteremic with bacteroids fragilis. Admitted in ICU due to hypotension and hypothermia. patient completed course of antibiotics . BCx X2 negative. Ammonia level elevated and patient refused NGT placement, mental status deteriorated and patient desaturated to high 70%, patient got intubated on 10/24 . Sputum was positive for MRSA and Stenotrophomonas. patient started on vancomycin and Levaquin. patient was given lactulose for high ammonia level. kidney function and liver function started to deteriorate and patient was leaning toward multi organ failure . high dose of Lasix started and patient was aggressively diuresed. Kidney function improved . Ammonia level dropped to less than 10.  Mental status improved and patient extubated on 11/3/2020. Patient was seen by wound specialist and decubitus ulcer debrided. patient had a drop in h/h s/p debridement . received 1 unit PRBC .   11/12  Transferred from ICU to SCU  `11/17  Midodrine d/c secondary to daptomycin being started.  11/18 Febrile, Tmax 101.5. Repeat BC sent.   11/19 Discontinue velazquez.   11/20 febrile overnight.   11/22 s/p 2 units PRBC for Hgb 6.4  11/24 MRI pelvis (+) OM  11/25 PAVAN no endocarditis. Awaiting PICC

## 2020-11-25 NOTE — PROGRESS NOTE ADULT - SUBJECTIVE AND OBJECTIVE BOX
Patient is seen and examined at the bed side, is afebrile.  She has no new complaints.  The MRI of pelvis shows  Osteomyelitis of the lower sacrum and coccyx with overlying cutaneous ulceration.       REVIEW OF SYSTEMS: All other review systems are negative      ALLERGIES: No Known Allergies      Vital Signs Last 24 Hrs  T(C): 36.8 (25 Nov 2020 12:55), Max: 38.5 (24 Nov 2020 22:49)  T(F): 98.2 (25 Nov 2020 12:55), Max: 101.3 (24 Nov 2020 22:49)  HR: 105 (25 Nov 2020 12:55) (100 - 106)  BP: 132/83 (25 Nov 2020 12:55) (116/71 - 132/83)  BP(mean): --  RR: 18 (25 Nov 2020 12:55) (16 - 18)  SpO2: 100% (25 Nov 2020 12:55) (100% - 100%)        PHYSICAL EXAM:  GENERAL: Not in distress, on oxygen via NC  CHEST/LUNG: Not using accessory muscles   HEART: s1 and s2 present  ABDOMEN:  Nontender and  Nondistended  EXTREMITIES: B/L UE edematous improved  CNS: Awake and Alert         LABS:                        10.3   7.88  )-----------( 272      ( 25 Nov 2020 07:26 )             33.6                           9.9    9.41  )-----------( 269      ( 24 Nov 2020 06:38 )             31.5                           9.4    13.14 )-----------( 149      ( 14 Nov 2020 07:58 )             28.7       11-25    146<H>  |  115<H>  |  21<H>  ----------------------------<  86  3.5   |  23  |  1.13    Ca    8.3<L>      25 Nov 2020 07:26  Phos  3.2     11-25  Mg     1.9     11-25    TPro  6.9  /  Alb  2.1<L>  /  TBili  2.9<H>  /  DBili  x   /  AST  55<H>  /  ALT  39  /  AlkPhos  200<H>  11-25 11-24    145  |  115<H>  |  22<H>  ----------------------------<  75  3.8   |  25  |  1.11    Ca    7.9<L>      24 Nov 2020 06:38  Phos  2.8     11-24  Mg     2.1     11-24    TPro  6.3  /  Alb  2.0<L>  /  TBili  3.0<H>  /  DBili  x   /  AST  49<H>  /  ALT  32  /  AlkPhos  170<H>  11-24 11-23    148<H>  |  115<H>  |  24<H>  ----------------------------<  67<L>  4.1   |  25  |  1.21    Ca    7.5<L>      23 Nov 2020 06:35  Phos  2.8     11-23  Mg     2.2     11-23    TPro  6.2  /  Alb  2.1<L>  /  TBili  2.8<H>  /  DBili  x   /  AST  48<H>  /  ALT  35  /  AlkPhos  162<H>  11-23 11-06    138  |  104  |  37<H>  ----------------------------<  81  3.9   |  25  |  2.37<H>    Ca    8.1<L>      06 Nov 2020 06:20  Phos  3.4     11-06  Mg     2.0     11-06    TPro  5.1<L>  /  Alb  2.8<L>  /  TBili  9.2<H>  /  DBili  x   /  AST  233<H>  /  ALT  99<H>  /  AlkPhos  96  11-06      Vancomycin Level, Trough (11.07.20 @ 21:49)   Vancomycin Level, Trough: 16.1:     Vancomycin Level, Trough (11.07.20 @ 07:08)   Vancomycin Level, Trough: 19.5    vanVancomycin Level, Trough (11.06.20 @ 06:20)   Vancomycin Level, Trough: 19.9:         MEDICATIONS  (STANDING):    ascorbic acid 500 milliGRAM(s) Oral two times a day  cefepime   IVPB      cefepime   IVPB 1000 milliGRAM(s) IV Intermittent every 24 hours  DAPTOmycin IVPB 350 milliGRAM(s) IV Intermittent every 24 hours  enoxaparin Injectable 50 milliGRAM(s) SubCutaneous daily  fluconAZOLE IVPB 200 milliGRAM(s) IV Intermittent every 24 hours  fluconAZOLE IVPB      furosemide    Tablet 20 milliGRAM(s) Oral daily  multivitamin/minerals 1 Tablet(s) Oral daily  nystatin Powder 1 Application(s) Topical two times a day  pantoprazole   Suspension 40 milliGRAM(s) Enteral Tube daily  rifAXIMin 550 milliGRAM(s) Oral two times a day  zinc sulfate 220 milliGRAM(s) Oral daily      RADIOLOGY & ADDITIONAL TESTS:      11/24/20 : MR Pelvis Bony Only No Cont (11.24.20 @ 18:02) : Osteomyelitis of the lower sacrum and coccyx with overlying cutaneous ulceration,      11/19/20 : Xray Chest 1 View- PORTABLE-Urgent (Xray Chest 1 View- PORTABLE-Urgent .) (11.19.20 @ 23:53): Improvement in bilateral airspace disease with persistent bibasilar consolidation and small effusions.    11/4/20 : Xray Chest 1 View- PORTABLE-Routine (Xray Chest 1 View- PORTABLE-Routine in AM.) (11.04.20 @ 10:59) Reexpansion of the left lung with residual left pleural effusion and/or infiltrate.  Right pulmonary edema unchanged.  Tubes and catheters in satisfactory position.      10/25/20: Xray Chest 1 View- PORTABLE-Routine (Xray Chest 1 View- PORTABLE-Routine in AM.) (10.25.20 @ 09:21) Frontal expiratory view of the chest shows the heart to be similar in size. Endotracheal tube, right jugular line and feeding tube remain present. The lungs show similar left upper lobe infiltrate with progression of right perihilar infiltrate. Pleural effusions are similar. There is no evidence of pneumothorax.    10/21/20 : CT Abdomen and Pelvis w/ Oral Cont and w/ IV Cont (10.21.20 @ 15:59) Mural thickening of the left colon and rectum. Liquid stool in the colon. Apparent segmental mural thickening of the mid to distal small bowel and the proximal duodenum. Findings may represent nonspecific enterocolitis, noninfectious inflammatory bowel disease, or ischemic bowel. Clinical correlation is recommended. The celiac axis artery, SMA, and KINA are patent without stenosis.    Dilatation of the mid small bowel is again noted. Oral contrast has reached the terminal ileum. Findings may represent small bowel obstruction, or ileus related to nonspecific enterocolitis.   Cholelithiasis. Moderate to large ascites in the abdomen, increased since the previous examination. 5 mm nonspecific noncalcified left upper lobe lung nodule; if the patient's is in the high risk category (i.e. smoker), follow-up chest CT may be pursued in 12 months to ensure stability. Combination of atelectasis and consolidation in the left lower lobe.  Possible 1.0 cm hypodense lesion in the left lobe of the thyroid. Thyroid ultrasound may be pursued for further evaluation.   Mild bilateral pleural effusions.  Aging determinate compression fracture at T5 vertebra.          MICROBIOLOGY DATA:    Urine Microscopic-Add On (NC) (11.20.20 @ 04:12)   Red Blood Cell - Urine: 5-10 /HPF   White Blood Cell - Urine: 11-25 /HPF   Calcium Oxalate Crystals: Few /HPF   Bacteria: Moderate /HPF   Comment - Urine: few amorphous urates   Epithelial Cells: Few /HPF     Culture - Blood (11.19.20 @ 10:19)   Specimen Source: .Blood Blood-Peripheral   Culture Results: No growth to date.     Culture - Surgical Swab (11.12.20 @ 05:01)   - Ampicillin: R >8 Predicts results to ampicillin/sulbactam, amoxacillin-clavulanate and piperacillin-tazobactam.   - Levofloxacin: R >4   - Linezolid: S 1   - Tetra/Doxy: R >8   - Vancomycin: R >16   Specimen Source: .Surgical Swab Sacral decub   Culture Results:   Few Enterococcus faecium (vancomycin resistant)   Rare Candida albicans "Susceptibilities not performed"   Organism Identification: Enterococcus faecium (vancomycin resistant)   Organism: Enterococcus faecium (vancomycin resistant)   Method Type: STEWART     Culture - Surgical Swab (11.12.20 @ 05:01)   Specimen Source: .Surgical Swab Sacral decub   Culture Results:  Few Enterococcus faecium Susceptibility to follow.   Rare Candida albicans "Susceptibilities not performed"     Culture - Sputum . (10.26.20 @ 00:26)   - Ampicillin/Sulbactam: R <=8/4   - Cefazolin: R >16   - Ceftazidime: I 16   - Clindamycin: R >4   - Erythromycin: R >4   - Gentamicin: S <=1 Should not be used as monotherapy   - Levofloxacin: S <=0.5   - Linezolid: S 2   - Oxacillin: R >2   - Penicillin: R >8   - RIF- Rifampin: S <=1 Should not be used as monotherapy   - Tetra/Doxy: S <=1   - Trimethoprim/Sulfamethoxazole: S <=0.5/9.5   - Trimethoprim/Sulfamethoxazole: S <=0.5/9.5   - Vancomycin: S 1     MRSA/MSSA PCR (10.21.20 @ 09:41)   MRSA PCR Result.: Detected:       Culture - Blood (10.20.20 @ 22:09)   Specimen Source: .Blood Blood   Culture Results:  No growth to date.     Culture - Blood (10.20.20 @ 22:09)   Specimen Source: .Blood Blood   Culture Results:   No growth to date.     Culture - Blood in AM (10.16.20 @ 10:13)   Specimen Source: .Blood Blood-Peripheral   Culture Results: No growth to date.     Culture - Blood in AM (10.16.20 @ 10:13)   Specimen Source: .Blood Blood-Peripheral   Culture Results: No growth to date.     Culture - Blood (10.13.20 @ 22:23)   Growth in anaerobic bottle: Gram Negative Rods   Specimen Source: .Blood Blood-Peripheral   Organism: Blood Culture PCR   Culture Results: Growth in anaerobic bottle: Bacteroides fragilis   "Susceptibilities not performed"     Culture - Blood (10.13.20 @ 22:23)   Specimen Source: .Blood Blood-Peripheral   Culture Results:   No growth to date.            Patient is seen and examined at the bed side, is afebrile now.  She is tolerating Daptomycin well. The WBC  count stay normal.      REVIEW OF SYSTEMS: All other review systems are negative      ALLERGIES: No Known Allergies      Vital Signs Last 24 Hrs  T(C): 36.8 (25 Nov 2020 12:55), Max: 38.5 (24 Nov 2020 22:49)  T(F): 98.2 (25 Nov 2020 12:55), Max: 101.3 (24 Nov 2020 22:49)  HR: 105 (25 Nov 2020 12:55) (100 - 106)  BP: 132/83 (25 Nov 2020 12:55) (116/71 - 132/83)  BP(mean): --  RR: 18 (25 Nov 2020 12:55) (16 - 18)  SpO2: 100% (25 Nov 2020 12:55) (100% - 100%)        PHYSICAL EXAM:  GENERAL: Not in distress, on oxygen via NC  CHEST/LUNG: Not using accessory muscles   HEART: s1 and s2 present  ABDOMEN:  Nontender and  Nondistended  EXTREMITIES: B/L UE edematous improved  CNS: Awake and Alert         LABS:                        10.3   7.88  )-----------( 272      ( 25 Nov 2020 07:26 )             33.6                           9.9    9.41  )-----------( 269      ( 24 Nov 2020 06:38 )             31.5                           9.4    13.14 )-----------( 149      ( 14 Nov 2020 07:58 )             28.7       11-25    146<H>  |  115<H>  |  21<H>  ----------------------------<  86  3.5   |  23  |  1.13    Ca    8.3<L>      25 Nov 2020 07:26  Phos  3.2     11-25  Mg     1.9     11-25    TPro  6.9  /  Alb  2.1<L>  /  TBili  2.9<H>  /  DBili  x   /  AST  55<H>  /  ALT  39  /  AlkPhos  200<H>  11-25 11-24    145  |  115<H>  |  22<H>  ----------------------------<  75  3.8   |  25  |  1.11    Ca    7.9<L>      24 Nov 2020 06:38  Phos  2.8     11-24  Mg     2.1     11-24    TPro  6.3  /  Alb  2.0<L>  /  TBili  3.0<H>  /  DBili  x   /  AST  49<H>  /  ALT  32  /  AlkPhos  170<H>  11-24 11-23    148<H>  |  115<H>  |  24<H>  ----------------------------<  67<L>  4.1   |  25  |  1.21    Ca    7.5<L>      23 Nov 2020 06:35  Phos  2.8     11-23  Mg     2.2     11-23    TPro  6.2  /  Alb  2.1<L>  /  TBili  2.8<H>  /  DBili  x   /  AST  48<H>  /  ALT  35  /  AlkPhos  162<H>  11-23 11-06    138  |  104  |  37<H>  ----------------------------<  81  3.9   |  25  |  2.37<H>    Ca    8.1<L>      06 Nov 2020 06:20  Phos  3.4     11-06  Mg     2.0     11-06    TPro  5.1<L>  /  Alb  2.8<L>  /  TBili  9.2<H>  /  DBili  x   /  AST  233<H>  /  ALT  99<H>  /  AlkPhos  96  11-06      Vancomycin Level, Trough (11.07.20 @ 21:49)   Vancomycin Level, Trough: 16.1:     Vancomycin Level, Trough (11.07.20 @ 07:08)   Vancomycin Level, Trough: 19.5    vanVancomycin Level, Trough (11.06.20 @ 06:20)   Vancomycin Level, Trough: 19.9:         MEDICATIONS  (STANDING):    ascorbic acid 500 milliGRAM(s) Oral two times a day  cefepime   IVPB      cefepime   IVPB 1000 milliGRAM(s) IV Intermittent every 24 hours  DAPTOmycin IVPB 350 milliGRAM(s) IV Intermittent every 24 hours  enoxaparin Injectable 50 milliGRAM(s) SubCutaneous daily  fluconAZOLE IVPB 200 milliGRAM(s) IV Intermittent every 24 hours  fluconAZOLE IVPB      furosemide    Tablet 20 milliGRAM(s) Oral daily  multivitamin/minerals 1 Tablet(s) Oral daily  nystatin Powder 1 Application(s) Topical two times a day  pantoprazole   Suspension 40 milliGRAM(s) Enteral Tube daily  rifAXIMin 550 milliGRAM(s) Oral two times a day  zinc sulfate 220 milliGRAM(s) Oral daily      RADIOLOGY & ADDITIONAL TESTS:      11/24/20 : MR Pelvis Bony Only No Cont (11.24.20 @ 18:02) : Osteomyelitis of the lower sacrum and coccyx with overlying cutaneous ulceration,      11/19/20 : Xray Chest 1 View- PORTABLE-Urgent (Xray Chest 1 View- PORTABLE-Urgent .) (11.19.20 @ 23:53): Improvement in bilateral airspace disease with persistent bibasilar consolidation and small effusions.    11/4/20 : Xray Chest 1 View- PORTABLE-Routine (Xray Chest 1 View- PORTABLE-Routine in AM.) (11.04.20 @ 10:59) Reexpansion of the left lung with residual left pleural effusion and/or infiltrate.  Right pulmonary edema unchanged.  Tubes and catheters in satisfactory position.      10/25/20: Xray Chest 1 View- PORTABLE-Routine (Xray Chest 1 View- PORTABLE-Routine in AM.) (10.25.20 @ 09:21) Frontal expiratory view of the chest shows the heart to be similar in size. Endotracheal tube, right jugular line and feeding tube remain present. The lungs show similar left upper lobe infiltrate with progression of right perihilar infiltrate. Pleural effusions are similar. There is no evidence of pneumothorax.    10/21/20 : CT Abdomen and Pelvis w/ Oral Cont and w/ IV Cont (10.21.20 @ 15:59) Mural thickening of the left colon and rectum. Liquid stool in the colon. Apparent segmental mural thickening of the mid to distal small bowel and the proximal duodenum. Findings may represent nonspecific enterocolitis, noninfectious inflammatory bowel disease, or ischemic bowel. Clinical correlation is recommended. The celiac axis artery, SMA, and KINA are patent without stenosis.    Dilatation of the mid small bowel is again noted. Oral contrast has reached the terminal ileum. Findings may represent small bowel obstruction, or ileus related to nonspecific enterocolitis.   Cholelithiasis. Moderate to large ascites in the abdomen, increased since the previous examination. 5 mm nonspecific noncalcified left upper lobe lung nodule; if the patient's is in the high risk category (i.e. smoker), follow-up chest CT may be pursued in 12 months to ensure stability. Combination of atelectasis and consolidation in the left lower lobe.  Possible 1.0 cm hypodense lesion in the left lobe of the thyroid. Thyroid ultrasound may be pursued for further evaluation.   Mild bilateral pleural effusions.  Aging determinate compression fracture at T5 vertebra.          MICROBIOLOGY DATA:    Urine Microscopic-Add On (NC) (11.20.20 @ 04:12)   Red Blood Cell - Urine: 5-10 /HPF   White Blood Cell - Urine: 11-25 /HPF   Calcium Oxalate Crystals: Few /HPF   Bacteria: Moderate /HPF   Comment - Urine: few amorphous urates   Epithelial Cells: Few /HPF     Culture - Blood (11.19.20 @ 10:19)   Specimen Source: .Blood Blood-Peripheral   Culture Results: No growth to date.     Culture - Surgical Swab (11.12.20 @ 05:01)   - Ampicillin: R >8 Predicts results to ampicillin/sulbactam, amoxacillin-clavulanate and piperacillin-tazobactam.   - Levofloxacin: R >4   - Linezolid: S 1   - Tetra/Doxy: R >8   - Vancomycin: R >16   Specimen Source: .Surgical Swab Sacral decub   Culture Results:   Few Enterococcus faecium (vancomycin resistant)   Rare Candida albicans "Susceptibilities not performed"   Organism Identification: Enterococcus faecium (vancomycin resistant)   Organism: Enterococcus faecium (vancomycin resistant)   Method Type: STEWART     Culture - Surgical Swab (11.12.20 @ 05:01)   Specimen Source: .Surgical Swab Sacral decub   Culture Results:  Few Enterococcus faecium Susceptibility to follow.   Rare Candida albicans "Susceptibilities not performed"     Culture - Sputum . (10.26.20 @ 00:26)   - Ampicillin/Sulbactam: R <=8/4   - Cefazolin: R >16   - Ceftazidime: I 16   - Clindamycin: R >4   - Erythromycin: R >4   - Gentamicin: S <=1 Should not be used as monotherapy   - Levofloxacin: S <=0.5   - Linezolid: S 2   - Oxacillin: R >2   - Penicillin: R >8   - RIF- Rifampin: S <=1 Should not be used as monotherapy   - Tetra/Doxy: S <=1   - Trimethoprim/Sulfamethoxazole: S <=0.5/9.5   - Trimethoprim/Sulfamethoxazole: S <=0.5/9.5   - Vancomycin: S 1     MRSA/MSSA PCR (10.21.20 @ 09:41)   MRSA PCR Result.: Detected:       Culture - Blood (10.20.20 @ 22:09)   Specimen Source: .Blood Blood   Culture Results:  No growth to date.     Culture - Blood (10.20.20 @ 22:09)   Specimen Source: .Blood Blood   Culture Results:   No growth to date.     Culture - Blood in AM (10.16.20 @ 10:13)   Specimen Source: .Blood Blood-Peripheral   Culture Results: No growth to date.     Culture - Blood in AM (10.16.20 @ 10:13)   Specimen Source: .Blood Blood-Peripheral   Culture Results: No growth to date.     Culture - Blood (10.13.20 @ 22:23)   Growth in anaerobic bottle: Gram Negative Rods   Specimen Source: .Blood Blood-Peripheral   Organism: Blood Culture PCR   Culture Results: Growth in anaerobic bottle: Bacteroides fragilis   "Susceptibilities not performed"     Culture - Blood (10.13.20 @ 22:23)   Specimen Source: .Blood Blood-Peripheral   Culture Results:   No growth to date.

## 2020-11-25 NOTE — PROGRESS NOTE ADULT - PROBLEM SELECTOR PLAN 1
Secondary to bacteremia . Bacteroides fragilis per BC on 10/13  Sputum positive for MRSA and Stenotrophomas  Aspiration pneumonia vs HCAP   Repeat BC 10/16 and 11/19 NGTD   UC on 11/20 NGTD    Infected sacral ulcer- s/p debridement and culture grew Enterococcus (VRE) and Candida ( sensitivity is pending)  Leukocytosis resolved, Cdiff negative  on Dapto and Fluconazole  MRI sacrum/pelvis shows OM   Echo and PAVAN negative for endocarditis  PICC  for extended abx (for total of 6 weeks, thru Dec 28th). IR informed. PICC in IR likely Friday.   Monitor CK weekly while on Dapto  velazquez  discontinued 11/19.   Permafit as needed.   ID Dr. Patricio following

## 2020-11-25 NOTE — PROGRESS NOTE ADULT - SUBJECTIVE AND OBJECTIVE BOX
Chief Complaint:  Patient is a 65y old  Female who presents with a chief complaint of Hypotension (25 Nov 2020 13:55)      Reason for consult: r/o Budd Chiari / abnormal liver enzymes    Interval Events: Patient was seen and examined at bedside, sitting out of bed to chair. AAOx3.  Being febrile again, being treated for osteomyelitis. Liver enzymes improved but mildly elevated.      Hospital Medications:  acetaminophen   Tablet .. 650 milliGRAM(s) Oral every 6 hours PRN  ascorbic acid 500 milliGRAM(s) Oral two times a day  cefepime   IVPB      cefepime   IVPB 1000 milliGRAM(s) IV Intermittent every 24 hours  DAPTOmycin IVPB 350 milliGRAM(s) IV Intermittent every 24 hours  enoxaparin Injectable 50 milliGRAM(s) SubCutaneous daily  fluconAZOLE IVPB 200 milliGRAM(s) IV Intermittent every 24 hours  fluconAZOLE IVPB      furosemide    Tablet 20 milliGRAM(s) Oral daily  multivitamin/minerals 1 Tablet(s) Oral daily  nystatin Powder 1 Application(s) Topical two times a day  pantoprazole   Suspension 40 milliGRAM(s) Enteral Tube daily  rifAXIMin 550 milliGRAM(s) Oral two times a day  zinc sulfate 220 milliGRAM(s) Oral daily      ROS:   General:  Febrile again  Eyes:  Good vision, no reported pain  ENT:  No sore throat, pain, runny nose  CV:  No pain, palpitations  Pulm:  No dyspnea, cough, but on NC O2   GI:  No abdominal pain, N/V, denies blood  :  No  dysuria  Neuro: AAOx3  Musculoskeletal: c/o buttock pain    PHYSICAL EXAM:   Vital Signs:  Vital Signs Last 24 Hrs  T(C): 36.8 (25 Nov 2020 12:55), Max: 38.5 (24 Nov 2020 22:49)  T(F): 98.2 (25 Nov 2020 12:55), Max: 101.3 (24 Nov 2020 22:49)  HR: 105 (25 Nov 2020 12:55) (100 - 106)  BP: 132/83 (25 Nov 2020 12:55) (116/71 - 132/83)  BP(mean): --  RR: 18 (25 Nov 2020 12:55) (16 - 18)  SpO2: 100% (25 Nov 2020 12:55) (100% - 100%)  Daily     Daily     GENERAL: no acute distress  NEURO: alert, no asterixis  HEENT: anicteric sclera, no conjunctival pallor appreciated  CHEST: no respiratory distress, no accessory muscle use  CARDIAC: regular rate, rhythm  ABDOMEN: soft, non-tender, non-distended, no rebound or guarding  EXTREMITIES: warm, well perfused, no edema  SKIN: no lesions noted    LABS: reviewed                        10.3   7.88  )-----------( 272      ( 25 Nov 2020 07:26 )             33.6     11-25    146<H>  |  115<H>  |  21<H>  ----------------------------<  86  3.5   |  23  |  1.13    Ca    8.3<L>      25 Nov 2020 07:26  Phos  3.2     11-25  Mg     1.9     11-25    TPro  6.9  /  Alb  2.1<L>  /  TBili  2.9<H>  /  DBili  x   /  AST  55<H>  /  ALT  39  /  AlkPhos  200<H>  11-25    LIVER FUNCTIONS - ( 25 Nov 2020 07:26 )  Alb: 2.1 g/dL / Pro: 6.9 g/dL / ALK PHOS: 200 U/L / ALT: 39 U/L DA / AST: 55 U/L / GGT: x             Interval Diagnostic Studies: see sunrise for full report

## 2020-11-25 NOTE — PROGRESS NOTE ADULT - ASSESSMENT
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  BC grew bacteroides.    Problem/Recommendation - 1:  fever again  will do fever w/u  blood culture, CXR, stool c.diff  procal is more elevated than before  on antibiotics  Problem/Recommendation - 2:·  Problem: Anemia. ferritin, b12 folate normal, no hemolysis  most likley due to malnutrition from short bowel syndrome.she also had GIB multiple times before.   the H/H dropped over weekend and she had transfsuion  ?bleeding vs acute infection causing acute anemia  Problem/Recommendation - 3:·  Problem: Acute deep vein thrombosis (DVT) of other vein of upper extremity.  Recommendation: she has been on xarelto for 2 years.  DVT at left arm and left leg before.  in all CT scan i did not see report of IVC or hepatic vein thrombosis.  off xarelto and lovenox due to elevated PT/PTT. elevated PT/PTT due to malnutrition and antibiotics causing VITK def  now it is mostly recovered  it begins to rise again, correctable by mixing study  probably due to liver failure this time  her liver function recovered and she was extubated.

## 2020-11-25 NOTE — PROGRESS NOTE ADULT - SUBJECTIVE AND OBJECTIVE BOX
MARIBETH PADILLA    SCU progress note    INTERVAL HPI/OVERNIGHT EVENTS: ***    DNR [ x]   DNI  [  ]  DNR / trial intubation/ trial feeding tube / trial IV fluids / use abx.    Covid - 19 PCR:     The 4Ms    What Matters Most: see [facility]  Age appropriate Medications/Screen for High Risk Medication: Yes  Mentation: see CAM below  Mobility: defer to physical exam    The Confusion Assessment Method (CAM) Diagnostic Algorithm     1: Acute Onset or Fluctuating Course  - Is there evidence of an acute change in mental status from the patient’s baseline? Did the (abnormal) behavior  fluctuate during the day, that is, tend to come and go, or increase and decrease in severity?  [ ] YES [ ] NO     2: Inattention  - Did the patient have difficulty focusing attention, being easily distractible, or having difficulty keeping track of what was being said?  [ ] YES [ ] NO     3: Disorganized thinking  -Was the patient’s thinking disorganized or incoherent, such as rambling or irrelevant conversation, unclear or illogical flow of ideas, or unpredictable switching from subject to subject?  [ ] YES [ ] NO    4: Altered Level of consciousness?  [ ] YES [ ] NO    The diagnosis of delirium by CAM requires the presence of features 1 and 2 and either 3 or 4.    PRESSORS: [ ] YES [ ] NO  cefepime   IVPB      cefepime   IVPB 1000 milliGRAM(s) IV Intermittent every 24 hours  DAPTOmycin IVPB 350 milliGRAM(s) IV Intermittent every 24 hours  fluconAZOLE IVPB 200 milliGRAM(s) IV Intermittent every 24 hours  fluconAZOLE IVPB      rifAXIMin 550 milliGRAM(s) Oral two times a day    Cardiovascular:  Heart Failure  Acute   Acute on Chronic  Chronic       furosemide    Tablet 20 milliGRAM(s) Oral daily    Pulmonary:    Hematalogic:  enoxaparin Injectable 50 milliGRAM(s) SubCutaneous daily    Other:  acetaminophen   Tablet .. 650 milliGRAM(s) Oral every 6 hours PRN  ascorbic acid 500 milliGRAM(s) Oral two times a day  multivitamin/minerals 1 Tablet(s) Oral daily  nystatin Powder 1 Application(s) Topical two times a day  pantoprazole   Suspension 40 milliGRAM(s) Enteral Tube daily  zinc sulfate 220 milliGRAM(s) Oral daily    acetaminophen   Tablet .. 650 milliGRAM(s) Oral every 6 hours PRN  ascorbic acid 500 milliGRAM(s) Oral two times a day  cefepime   IVPB      cefepime   IVPB 1000 milliGRAM(s) IV Intermittent every 24 hours  DAPTOmycin IVPB 350 milliGRAM(s) IV Intermittent every 24 hours  enoxaparin Injectable 50 milliGRAM(s) SubCutaneous daily  fluconAZOLE IVPB 200 milliGRAM(s) IV Intermittent every 24 hours  fluconAZOLE IVPB      furosemide    Tablet 20 milliGRAM(s) Oral daily  multivitamin/minerals 1 Tablet(s) Oral daily  nystatin Powder 1 Application(s) Topical two times a day  pantoprazole   Suspension 40 milliGRAM(s) Enteral Tube daily  rifAXIMin 550 milliGRAM(s) Oral two times a day  zinc sulfate 220 milliGRAM(s) Oral daily    Drug Dosing Weight  Height (cm): 167.6 (21 Oct 2020 02:26)  Weight (kg): 56.2 (21 Oct 2020 02:26)  BMI (kg/m2): 20 (21 Oct 2020 02:26)  BSA (m2): 1.63 (21 Oct 2020 02:26)    CENTRAL LINE: [ ] YES [ ] NO  LOCATION:   DATE INSERTED:  REMOVE: [ ] YES [ ] NO  EXPLAIN:    TREJO: [ ] YES [ ] NO    DATE INSERTED:  REMOVE:  [ ] YES [ ] NO  EXPLAIN:    PAST MEDICAL & SURGICAL HISTORY:  Anasarca    Pulmonary embolism    DVT (deep venous thrombosis)    SBO (small bowel obstruction)    H/O exploratory laparotomy                      PHYSICAL EXAM:    GENERAL:cachectic,  NAD  HEAD:  Atraumatic, Normocephalic  EYES: EOMI, PERRLA, conjunctiva and sclera clear  ENMT: No tonsillar erythema, exudates, or enlargement; Moist mucous membranes, Good dentition, No lesions  NECK: Supple, No JVD, Normal thyroid  NERVOUS SYSTEM:  Alert & Oriented X3,  Motor Strength 5/5 B/L upper, 4/5 BLE.   CHEST/LUNG: Unlabored. Clear to percussion bilaterally; No rales, rhonchi, wheezing, or rubs  HEART: Regular rate and rhythm; No murmurs, rubs, or gallops  ABDOMEN: Soft, Nontender, Nondistended; Bowel sounds present  EXTREMITIES:  2+ Peripheral Pulses, No clubbing, cyanosis, or edema  LYMPH: No lymphadenopathy noted  SKIN: Unstageable sacral DTI      LABS:  CBC Full  -  ( 25 Nov 2020 07:26 )  WBC Count : 7.88 K/uL  RBC Count : 3.64 M/uL  Hemoglobin : 10.3 g/dL  Hematocrit : 33.6 %  Platelet Count - Automated : 272 K/uL  Mean Cell Volume : 92.3 fl  Mean Cell Hemoglobin : 28.3 pg  Mean Cell Hemoglobin Concentration : 30.7 gm/dL  Auto Neutrophil # : x  Auto Lymphocyte # : x  Auto Monocyte # : x  Auto Eosinophil # : x  Auto Basophil # : x  Auto Neutrophil % : x  Auto Lymphocyte % : x  Auto Monocyte % : x  Auto Eosinophil % : x  Auto Basophil % : x    11-25    146<H>  |  115<H>  |  21<H>  ----------------------------<  86  3.5   |  23  |  1.13    Ca    8.3<L>      25 Nov 2020 07:26  Phos  3.2     11-25  Mg     1.9     11-25    TPro  6.9  /  Alb  2.1<L>  /  TBili  2.9<H>  /  DBili  x   /  AST  55<H>  /  ALT  39  /  AlkPhos  200<H>  11-25              [  ]  DVT Prophylaxis  [  ]  Nutrition, Brand, Rate         Goal Rate        Abnormal Nutritional Status -  Malnutrition   Cachexia      Morbid Obesity BMI >/=40    RADIOLOGY & ADDITIONAL STUDIES:  ***    Goals of Care Discussion with Family/Proxy/Other   - see note from/family meeting : 11/18     MARIBETH PADILLA    SCU progress note    INTERVAL HPI/OVERNIGHT EVENTS: ***s/p PAVAN this morning. Now awaiting  PICC placement for abx . IR informed this morning.       DNR [ x]   DNI  [  ]  DNR / trial intubation/ trial feeding tube / trial IV fluids / use abx.    Covid - 19 PCR:     The 4Ms    What Matters Most: see [facility]  Age appropriate Medications/Screen for High Risk Medication: Yes  Mentation: see CAM below  Mobility: defer to physical exam    The Confusion Assessment Method (CAM) Diagnostic Algorithm     1: Acute Onset or Fluctuating Course  - Is there evidence of an acute change in mental status from the patient’s baseline? Did the (abnormal) behavior  fluctuate during the day, that is, tend to come and go, or increase and decrease in severity?  [ ] YES [ x] NO     2: Inattention  - Did the patient have difficulty focusing attention, being easily distractible, or having difficulty keeping track of what was being said?  [ ] YES [ x] NO     3: Disorganized thinking  -Was the patient’s thinking disorganized or incoherent, such as rambling or irrelevant conversation, unclear or illogical flow of ideas, or unpredictable switching from subject to subject?  [ ] YES [ x] NO    4: Altered Level of consciousness?  [ ] YES [x ] NO    The diagnosis of delirium by CAM requires the presence of features 1 and 2 and either 3 or 4.    PRESSORS: [ ] YES [ ] NO  cefepime   IVPB      cefepime   IVPB 1000 milliGRAM(s) IV Intermittent every 24 hours  DAPTOmycin IVPB 350 milliGRAM(s) IV Intermittent every 24 hours  fluconAZOLE IVPB 200 milliGRAM(s) IV Intermittent every 24 hours  fluconAZOLE IVPB      rifAXIMin 550 milliGRAM(s) Oral two times a day    Cardiovascular:  PAVAN 11/25:  Normal Left Ventricular Systolic Function,  (EF= 55 to  60%)No obvious evidence of endocarditis    furosemide    Tablet 20 milliGRAM(s) Oral daily    Pulmonary:    Hematologic:  enoxaparin Injectable 50 milliGRAM(s) SubCutaneous daily    Other:  acetaminophen   Tablet .. 650 milliGRAM(s) Oral every 6 hours PRN  ascorbic acid 500 milliGRAM(s) Oral two times a day  multivitamin/minerals 1 Tablet(s) Oral daily  nystatin Powder 1 Application(s) Topical two times a day  pantoprazole   Suspension 40 milliGRAM(s) Enteral Tube daily  zinc sulfate 220 milliGRAM(s) Oral daily    acetaminophen   Tablet .. 650 milliGRAM(s) Oral every 6 hours PRN  ascorbic acid 500 milliGRAM(s) Oral two times a day  cefepime   IVPB      cefepime   IVPB 1000 milliGRAM(s) IV Intermittent every 24 hours  DAPTOmycin IVPB 350 milliGRAM(s) IV Intermittent every 24 hours  enoxaparin Injectable 50 milliGRAM(s) SubCutaneous daily  fluconAZOLE IVPB 200 milliGRAM(s) IV Intermittent every 24 hours  fluconAZOLE IVPB      furosemide    Tablet 20 milliGRAM(s) Oral daily  multivitamin/minerals 1 Tablet(s) Oral daily  nystatin Powder 1 Application(s) Topical two times a day  pantoprazole   Suspension 40 milliGRAM(s) Enteral Tube daily  rifAXIMin 550 milliGRAM(s) Oral two times a day  zinc sulfate 220 milliGRAM(s) Oral daily    Drug Dosing Weight  Height (cm): 167.6 (21 Oct 2020 02:26)  Weight (kg): 56.2 (21 Oct 2020 02:26)  BMI (kg/m2): 20 (21 Oct 2020 02:26)  BSA (m2): 1.63 (21 Oct 2020 02:26)    CENTRAL LINE: [ ] YES [ x] NO  LOCATION:   DATE INSERTED:  REMOVE: [ ] YES [ ] NO  EXPLAIN:    TREJO: [ ] YES [x] NO    DATE INSERTED:  REMOVE:  [ ] YES [ ] NO  EXPLAIN:    PAST MEDICAL & SURGICAL HISTORY:  Anasarca    Pulmonary embolism    DVT (deep venous thrombosis)    SBO (small bowel obstruction)    H/O exploratory laparotomy      PHYSICAL EXAM:    GENERAL:cachectic,  NAD  HEAD:  Atraumatic, Normocephalic  EYES: EOMI, PERRLA, conjunctiva and sclera clear  ENMT: No tonsillar erythema, exudates, or enlargement; Moist mucous membranes, Good dentition, No lesions  NECK: Supple, No JVD, Normal thyroid  NERVOUS SYSTEM:  Alert & Oriented X3,  Motor Strength 5/5 B/L upper, 4/5 BLE.   CHEST/LUNG: Unlabored. Clear to percussion bilaterally; No rales, rhonchi, wheezing, or rubs  HEART: Regular rate and rhythm; No murmurs, rubs, or gallops  ABDOMEN: Soft, Nontender, Nondistended; Bowel sounds present  EXTREMITIES:  2+ Peripheral Pulses, No clubbing, cyanosis, or edema  LYMPH: No lymphadenopathy noted  SKIN: Unstageable sacral DTI      LABS:  CBC Full  -  ( 25 Nov 2020 07:26 )  WBC Count : 7.88 K/uL  RBC Count : 3.64 M/uL  Hemoglobin : 10.3 g/dL  Hematocrit : 33.6 %  Platelet Count - Automated : 272 K/uL  Mean Cell Volume : 92.3 fl  Mean Cell Hemoglobin : 28.3 pg  Mean Cell Hemoglobin Concentration : 30.7 gm/dL  Auto Neutrophil # : x  Auto Lymphocyte # : x  Auto Monocyte # : x  Auto Eosinophil # : x  Auto Basophil # : x  Auto Neutrophil % : x  Auto Lymphocyte % : x  Auto Monocyte % : x  Auto Eosinophil % : x  Auto Basophil % : x    11-25    146<H>  |  115<H>  |  21<H>  ----------------------------<  86  3.5   |  23  |  1.13    Ca    8.3<L>      25 Nov 2020 07:26  Phos  3.2     11-25  Mg     1.9     11-25    TPro  6.9  /  Alb  2.1<L>  /  TBili  2.9<H>  /  DBili  x   /  AST  55<H>  /  ALT  39  /  AlkPhos  200<H>  11-25              [  ]  DVT Prophylaxis  [  ]  Nutrition, Brand, Rate         Goal Rate        Abnormal Nutritional Status -  Malnutrition   Cachexia      Morbid Obesity BMI >/=40    RADIOLOGY & ADDITIONAL STUDIES:  ***    Goals of Care Discussion with Family/Proxy/Other   - see note from/family meeting : 11/18

## 2020-11-25 NOTE — PROGRESS NOTE ADULT - ASSESSMENT
Patient is a 65y old  Female from home, lives with daughter, ambulates with walker with PMH of recurrent SBO's, s/p exp lap, SB resection in 2015, ex lap, ALBINA in 2018, DVT, PE, on Xarelto, IVC filter, chronic leg swelling, and anasarca, Now send in to the ER by her PCP, Dr. Ross, for evaluation of  generalized weakness and hypotension BP of 80/40 during office visit. On admission, she found to have no fever and BP was in lower normal range and no Leukocytosis. The CXR is clear and CT abd/pelvis consistent with Ileus. The ID consult requested to assist with evaluation of infectious etiology of episode of hypotension. Found to have Bacteroides bacteremia and now developed septic shock on Cefepime and Flagyl. Hence transferred to ICU. 10/20/20.    # Hypotension  at office- BP of 80/40 - resolved   #  Bacteroides fragilis Bacteremia - 10/13/20 - ? source most likely GI- Repeat Blood Cxs have NGTD 10/16/20  # Ileus  - h/o recurrent SBO and s/p EXp. LAp  # COVID 19 negative   # Septic shock ( Hypothermia + hypotensive)- transferred to ICU since requiring pressor- source GI, The CT abd/pelvis shows nonspecific enterocolitis, noninfectious inflammatory bowel disease, or ischemic bowel, along with ileus.   # Pneumonia- HCAP vs Aspiration - on CXR 10/24 and CT chest 10/21- sputum Cx grew Methicillin resistant Staphylococcus aureus  and Stenotrophomonas maltophilia.  # S/p extubated 11/9/20  # Infected sacral ulcer- s/p debridement and culture grew Enterococcus, VRE and Candida, sensitivity is pending- The MRI of pelvis shows  Osteomyelitis of the lower sacrum and coccyx with overlying cutaneous ulceration.   # Fever- 11/8 and 11/19- BCX NGTD 11/19 - The CXR shows Improvement in bilateral airspace disease with persistent bibasilar consolidation. She is s/p removal of velazquez catheter and passed TOV.      would recommend:    1. Monitor Temp. and c/w supportive care  2. Continue Daptomycin to cover VRE  and  Fluconazole to cover Candida in sacral wound culture X 6  weeks to treat Osteomyelitis  3. Monitor kidney function, is improving  4. Aspiration precaution  5. Continue Sacral Wound care  and Frequent Repositioning     d/w Dr. Linda, Pharm- D    Attending Attestation:    Spent more than 45 minutes on total encounter, more than 50 % of the visit was spent counseling and/or coordinating care by the Attending physician. Patient is a 65y old  Female from home, lives with daughter, ambulates with walker with PMH of recurrent SBO's, s/p exp lap, SB resection in 2015, ex lap, ALBINA in 2018, DVT, PE, on Xarelto, IVC filter, chronic leg swelling, and anasarca, Now send in to the ER by her PCP, Dr. Ross, for evaluation of  generalized weakness and hypotension BP of 80/40 during office visit. On admission, she found to have no fever and BP was in lower normal range and no Leukocytosis. The CXR is clear and CT abd/pelvis consistent with Ileus. The ID consult requested to assist with evaluation of infectious etiology of episode of hypotension. Found to have Bacteroides bacteremia and now developed septic shock on Cefepime and Flagyl. Hence transferred to ICU. 10/20/20.    # Hypotension  at office- BP of 80/40 - resolved   #  Bacteroides fragilis Bacteremia - 10/13/20 - ? source most likely GI- Repeat Blood Cxs have NGTD 10/16/20  # Ileus  - h/o recurrent SBO and s/p EXp. LAp  # COVID 19 negative   # Septic shock ( Hypothermia + hypotensive)- transferred to ICU since requiring pressor- source GI, The CT abd/pelvis shows nonspecific enterocolitis, noninfectious inflammatory bowel disease, or ischemic bowel, along with ileus.   # Pneumonia- HCAP vs Aspiration - on CXR 10/24 and CT chest 10/21- sputum Cx grew Methicillin resistant Staphylococcus aureus  and Stenotrophomonas maltophilia.  # S/p extubated 11/9/20  # Infected sacral ulcer- s/p debridement and culture grew Enterococcus, VRE and Candida, sensitivity is pending- The MRI of pelvis shows  Osteomyelitis of the lower sacrum and coccyx with overlying cutaneous ulceration.   # Fever- 11/8 and 11/19- BCX NGTD 11/19 - The CXR shows Improvement in bilateral airspace disease with persistent bibasilar consolidation. She is s/p removal of velazquez catheter and passed TOV.      would recommend:    1. Surgical follow up for further debridement of sacral wound since still spiking fever  2. Monitor Temp. and c/w supportive care  3. Continue Daptomycin to cover VRE  and  Fluconazole to cover Candida in sacral wound culture X 6  weeks to treat Osteomyelitis, might change to Linezolid for last two weeks if she is OFf midodrine   4. Monitor kidney function, is improving  5. Aspiration precaution  6. Continue Sacral Wound care  and Frequent Repositioning     d/w Dr. Burch and Joshua, Pharm- D    Attending Attestation:    Spent more than 45 minutes on total encounter, more than 50 % of the visit was spent counseling and/or coordinating care by the Attending physician.

## 2020-11-25 NOTE — PROGRESS NOTE ADULT - PROBLEM SELECTOR PLAN 6
Secondary to chronic disease.  Hgb improved after 2 units PRBC for Hgb 6.4  Hgb stable   Continue to monitor.

## 2020-11-25 NOTE — PROGRESS NOTE ADULT - ASSESSMENT
64 yo Female with Hx of recurrent SBO, s/p SB resection in 2015, s/p lysis of adhesions in 2018, Hx of DVT, PE (was on Xarelto), IVC filter was admitted with sepsis, found to have Bacteroides fragilis bacteremia, transferred to ICU b/o hypotension, hypothermia, was on pressors, intubated, developed HCAP/aspirational PNA, sputum growing MRSA and Stenotrophomonas, MOF with EF 10-15%, encephalopathy, worsening liver tests; successfully treated with antibiotics / HF mgmt., now extubated with improved mental status, and improved liver tests, but with infected sacral decubiti, being treated for osteomyelitis, on daptomycin and fluconazole.      - Possibly the combination of HF, sepsis, significantly improved, but still with hyperbilirubinemia (2.9), AST 55, ALT 39, , without encephalopathy, cannot rule out indeed underlying vascular condition or other etiology (so far KATHY neg, consider sending out rest of CLD, including AI workup)  - Repeat US w Doppler in ICU was not successful now with patient improvement, can attempt again to visualize HV  - Prior admission patient was referred to [facility] for workup for possible HV thrombosis, patient can follow there upon discharge as previously planned, but now still being treated for decubiti / OM, febrile again  - Had EF 10-15%, repeat echo shows improvement 55-60%.    Will continue to follow  D/w primary team  Thank you for consult

## 2020-11-25 NOTE — PROGRESS NOTE ADULT - SUBJECTIVE AND OBJECTIVE BOX
feel better  but still has fever  no abd pain, sob or cough  no diarrhea  procal more elevated than before    ROS:  Negative except for:    MEDICATIONS  (STANDING):  ascorbic acid 500 milliGRAM(s) Oral two times a day  cefepime   IVPB      cefepime   IVPB 1000 milliGRAM(s) IV Intermittent every 24 hours  DAPTOmycin IVPB 350 milliGRAM(s) IV Intermittent every 24 hours  enoxaparin Injectable 50 milliGRAM(s) SubCutaneous daily  fluconAZOLE IVPB 200 milliGRAM(s) IV Intermittent every 24 hours  fluconAZOLE IVPB      furosemide    Tablet 20 milliGRAM(s) Oral daily  multivitamin/minerals 1 Tablet(s) Oral daily  nystatin Powder 1 Application(s) Topical two times a day  pantoprazole   Suspension 40 milliGRAM(s) Enteral Tube daily  rifAXIMin 550 milliGRAM(s) Oral two times a day  zinc sulfate 220 milliGRAM(s) Oral daily    MEDICATIONS  (PRN):  acetaminophen   Tablet .. 650 milliGRAM(s) Oral every 6 hours PRN Temp greater or equal to 38C (100.4F)      Allergies    No Known Allergies    Intolerances        Vital Signs Last 24 Hrs  T(C): 36.9 (25 Nov 2020 20:38), Max: 38.5 (24 Nov 2020 22:49)  T(F): 98.4 (25 Nov 2020 20:38), Max: 101.3 (24 Nov 2020 22:49)  HR: 104 (25 Nov 2020 20:38) (100 - 105)  BP: 123/78 (25 Nov 2020 20:38) (116/71 - 132/83)  BP(mean): --  RR: 20 (25 Nov 2020 20:38) (16 - 20)  SpO2: 100% (25 Nov 2020 20:38) (100% - 100%)    PHYSICAL EXAM  General: adult in NAD  HEENT: clear oropharynx, anicteric sclera, pink conjunctiva  Neck: supple  CV: normal S1/S2 with no murmur rubs or gallops  Lungs: positive air movement b/l ant lungs,clear to auscultation, no wheezes, no rales  Abdomen: soft non-tender non-distended, no hepatosplenomegaly  Ext: no clubbing cyanosis or edema  Skin: no rashes and no petechiae  Neuro: alert and oriented X 4, no focal deficits  LABS:                          10.3   7.88  )-----------( 272      ( 25 Nov 2020 07:26 )             33.6         Mean Cell Volume : 92.3 fl  Mean Cell Hemoglobin : 28.3 pg  Mean Cell Hemoglobin Concentration : 30.7 gm/dL  Auto Neutrophil # : x  Auto Lymphocyte # : x  Auto Monocyte # : x  Auto Eosinophil # : x  Auto Basophil # : x  Auto Neutrophil % : x  Auto Lymphocyte % : x  Auto Monocyte % : x  Auto Eosinophil % : x  Auto Basophil % : x    Serial CBC  Hematocrit 33.6  Hemoglobin 10.3  Plat 272  RBC 3.64  WBC 7.88  Serial CBC  Hematocrit 31.5  Hemoglobin 9.9  Plat 269  RBC 3.45  WBC 9.41  Serial CBC  Hematocrit 30.4  Hemoglobin 9.5  Plat 239  RBC 3.34  WBC 9.57  Serial CBC  Hematocrit 20.5  Hemoglobin 6.4  Plat 243  RBC 2.21  WBC 11.57  Serial CBC  Hematocrit 21.7  Hemoglobin 6.7  Plat 248  RBC 2.32  WBC 11.54    11-25    146<H>  |  115<H>  |  21<H>  ----------------------------<  86  3.5   |  23  |  1.13    Ca    8.3<L>      25 Nov 2020 07:26  Phos  3.2     11-25  Mg     1.9     11-25    TPro  6.9  /  Alb  2.1<L>  /  TBili  2.9<H>  /  DBili  x   /  AST  55<H>  /  ALT  39  /  AlkPhos  200<H>  11-25                    BLOOD SMEAR INTERPRETATION:       RADIOLOGY & ADDITIONAL STUDIES:

## 2020-11-25 NOTE — PROGRESS NOTE ADULT - PROBLEM SELECTOR PLAN 5
Stage IV-unstageable  S/P sacral wound debridement  No active infection per Surgery `  MRI (+) OM  Continue fluconazole as per ID.   con't Daptomycin (ordered 11/17) for total of 6 weeks (thru Dec 28th).  PICC placement in IR , likely Friday. Hold Lovenox on Friday.   Monitor CK while on Dapto   Continue wound care as ordered.  Turn every 2 hours.  OOB daily.

## 2020-11-25 NOTE — PROGRESS NOTE ADULT - SUBJECTIVE AND OBJECTIVE BOX
Patient was seen and examined  Patient is a 65y old  Female who presents with a chief complaint of Hypotension (25 Nov 2020 11:31)      INTERVAL HPI/OVERNIGHT EVENTS:  T(C): 36.8 (11-25-20 @ 12:55), Max: 38.5 (11-24-20 @ 22:49)  HR: 105 (11-25-20 @ 12:55) (100 - 106)  BP: 132/83 (11-25-20 @ 12:55) (116/71 - 132/83)  RR: 18 (11-25-20 @ 12:55) (16 - 18)  SpO2: 100% (11-25-20 @ 12:55) (100% - 100%)  Wt(kg): --  I&O's Summary      LABS:                        10.3   7.88  )-----------( 272      ( 25 Nov 2020 07:26 )             33.6     11-25    146<H>  |  115<H>  |  21<H>  ----------------------------<  86  3.5   |  23  |  1.13    Ca    8.3<L>      25 Nov 2020 07:26  Phos  3.2     11-25  Mg     1.9     11-25    TPro  6.9  /  Alb  2.1<L>  /  TBili  2.9<H>  /  DBili  x   /  AST  55<H>  /  ALT  39  /  AlkPhos  200<H>  11-25        CAPILLARY BLOOD GLUCOSE      POCT Blood Glucose.: 93 mg/dL (25 Nov 2020 06:04)  POCT Blood Glucose.: 126 mg/dL (24 Nov 2020 23:53)              MEDICATIONS  (STANDING):  ascorbic acid 500 milliGRAM(s) Oral two times a day  cefepime   IVPB      cefepime   IVPB 1000 milliGRAM(s) IV Intermittent every 24 hours  DAPTOmycin IVPB 350 milliGRAM(s) IV Intermittent every 24 hours  enoxaparin Injectable 50 milliGRAM(s) SubCutaneous daily  fluconAZOLE IVPB 200 milliGRAM(s) IV Intermittent every 24 hours  fluconAZOLE IVPB      furosemide    Tablet 20 milliGRAM(s) Oral daily  multivitamin/minerals 1 Tablet(s) Oral daily  nystatin Powder 1 Application(s) Topical two times a day  pantoprazole   Suspension 40 milliGRAM(s) Enteral Tube daily  rifAXIMin 550 milliGRAM(s) Oral two times a day  zinc sulfate 220 milliGRAM(s) Oral daily    MEDICATIONS  (PRN):  acetaminophen   Tablet .. 650 milliGRAM(s) Oral every 6 hours PRN Temp greater or equal to 38C (100.4F)      RADIOLOGY & ADDITIONAL TESTS:    Imaging Personally Reviewed:  [ ] YES  [ ] NO    REVIEW OF SYSTEMS:  CONSTITUTIONAL: No fever, weight loss, or fatigue  EYES: No eye pain, visual disturbances, or discharge  ENMT:  No difficulty hearing, tinnitus, vertigo; No sinus or throat pain  NECK: No pain or stiffness  BREASTS: No pain, masses, or nipple discharge  RESPIRATORY: No cough, wheezing, chills or hemoptysis; No shortness of breath  CARDIOVASCULAR: No chest pain, palpitations, dizziness, or leg swelling  GASTROINTESTINAL: No abdominal or epigastric pain. No nausea, vomiting, or hematemesis; No diarrhea or constipation. No melena or hematochezia.  GENITOURINARY: No dysuria, frequency, hematuria, or incontinence  NEUROLOGICAL: No headaches, memory loss, loss of strength, numbness, or tremors  SKIN: No itching, burning, rashes, or lesions   LYMPH NODES: No enlarged glands  ENDOCRINE: No heat or cold intolerance; No hair loss  MUSCULOSKELETAL: No joint pain or swelling; No muscle, back, or extremity pain  PSYCHIATRIC: No depression, anxiety, mood swings, or difficulty sleeping  HEME/LYMPH: No easy bruising, or bleeding gums  ALLERY AND IMMUNOLOGIC: No hives or eczema      Consultant(s) Notes Reviewed:  [x] YES  [ ] NO    PHYSICAL EXAM:  GENERAL: NAD, well-groomed, well-developed  HEAD:  Atraumatic, Normocephalic  EYES: EOMI, PERRLA, conjunctiva and sclera clear  ENMT: No tonsillar erythema, exudates, or enlargement; Moist mucous membranes, Good dentition, No lesions  NECK: Supple, No JVD, Normal thyroid  NERVOUS SYSTEM:  Alert & Oriented X3, Good concentration; Motor Strength 5/5 B/L upper and lower extremities; DTRs 2+ intact and symmetric  CHEST/LUNG: Clear to percussion bilaterally; No rales, rhonchi, wheezing, or rubs  HEART: Regular rate and rhythm; No murmurs, rubs, or gallops  ABDOMEN: Soft, Nontender, Nondistended; Bowel sounds present  EXTREMITIES:  2+ Peripheral Pulses, No clubbing, cyanosis, or edema  LYMPH: No lymphadenopathy noted  SKIN: No rashes or lesions    Care Discussed with Consultants/Other Providers [ x] YES  [ ] NO

## 2020-11-26 NOTE — PROGRESS NOTE ADULT - ASSESSMENT
65y Female from home, lives with daughter, ambulates with walker with PMH of recurrent SBO's, s/p exp lap, SB resection in 2015, ex lap, ALBINA in 2018, DVT, PE, on Xarelto, IVC filter, chronic leg swelling, and anasarca, came in to the ED due to hypotension during office visit. Patient was found to be bacteremic with bacteroids fragilis. Admitted in ICU due to hypotension and hypothermia. patient completed course of antibiotics . BCx X2 negative. Ammonia level elevated and patient refused NGT placement, mental status deteriorated and patient desaturated to high 70%, patient got intubated on 10/24 . Sputum was positive for MRSA and Stenotrophomonas. patient started on vancomycin and Levaquin. patient was given lactulose for high ammonia level. kidney function and liver function started to deteriorate and patient was leaning toward multi organ failure . high dose of Lasix started and patient was aggressively diuresed. Kidney function improved . Ammonia level dropped to less than 10.  Mental status improved and patient extubated on 11/3/2020. Patient was seen by wound specialist and decubitus ulcer debrided. patient had a drop in h/h s/p debridement . received 1 unit PRBC .     11/12  Transferred from ICU to SCU  `11/17  Midodrine d/c secondary to daptomycin being started.  11/18 Febrile, Tmax 101.5. Repeat BC sent.   11/19 Discontinue velazquez.   11/20 febrile overnight.   11/22 s/p 2 units PRBC for Hgb 6.4  11/24 MRI pelvis (+) OM  11/25 PAVAN no endocarditis. Awaiting PICC

## 2020-11-26 NOTE — PROGRESS NOTE ADULT - SUBJECTIVE AND OBJECTIVE BOX
MARIBETH PADILLA    SCU progress note    INTERVAL HPI/OVERNIGHT EVENTS: No acute events overnight. Awaiting  PICC placement for antibiotic therapy outpatient.      DNR [ x]   DNI  [  ]  DNR / trial intubation/ trial feeding tube / trial IV fluids / use abx.    Covid - 19 PCR:   COVID-19 PCR: [facility] (17 Nov 2020 08:05)  SARS-CoV-2: [facility] (13 Oct 2020 16:00)    The 4Ms  What Matters Most: see GOC  Age appropriate Medications/Screen for High Risk Medication: Yes  Mentation: see CAM below  Mobility: defer to physical exam    The Confusion Assessment Method (CAM) Diagnostic Algorithm     1: Acute Onset or Fluctuating Course  - Is there evidence of an acute change in mental status from the patient’s baseline? Did the (abnormal) behavior  fluctuate during the day, that is, tend to come and go, or increase and decrease in severity?  [ ] YES [ x] NO     2: Inattention  - Did the patient have difficulty focusing attention, being easily distractible, or having difficulty keeping track of what was being said?  [ ] YES [ x] NO     3: Disorganized thinking  -Was the patient’s thinking disorganized or incoherent, such as rambling or irrelevant conversation, unclear or illogical flow of ideas, or unpredictable switching from subject to subject?  [ ] YES [ x] NO    4: Altered Level of consciousness?  [ ] YES [x ] NO    The diagnosis of delirium by CAM requires the presence of features 1 and 2 and either 3 or 4.    PRESSORS: [ ] YES [x ] NO  MEDICATIONS  (STANDING):  ascorbic acid 500 milliGRAM(s) Oral two times a day  cefepime   IVPB      cefepime   IVPB 1000 milliGRAM(s) IV Intermittent every 24 hours  DAPTOmycin IVPB 350 milliGRAM(s) IV Intermittent every 24 hours  enoxaparin Injectable 50 milliGRAM(s) SubCutaneous daily  fluconAZOLE IVPB 200 milliGRAM(s) IV Intermittent every 24 hours  fluconAZOLE IVPB      furosemide    Tablet 20 milliGRAM(s) Oral daily  multivitamin/minerals 1 Tablet(s) Oral daily  nystatin Powder 1 Application(s) Topical two times a day  pantoprazole   Suspension 40 milliGRAM(s) Enteral Tube daily  rifAXIMin 550 milliGRAM(s) Oral two times a day  zinc sulfate 220 milliGRAM(s) Oral daily    MEDICATIONS  (PRN):  acetaminophen   Tablet .. 650 milliGRAM(s) Oral every 6 hours PRN Temp greater or equal to 38C (100.4F)    Drug Dosing Weight  Height (cm): 167.6 (21 Oct 2020 02:26)  Weight (kg): 56.2 (21 Oct 2020 02:26)  BMI (kg/m2): 20 (21 Oct 2020 02:26)  BSA (m2): 1.63 (21 Oct 2020 02:26)    CENTRAL LINE: [ ] YES [ x] NO  LOCATION:   DATE INSERTED:  REMOVE: [ ] YES [ ] NO  EXPLAIN:    TREJO: [ ] YES [x] NO    DATE INSERTED:  REMOVE:  [ ] YES [ ] NO  EXPLAIN:    PAST MEDICAL & SURGICAL HISTORY:  Anasarca    Pulmonary embolism    DVT (deep venous thrombosis)    SBO (small bowel obstruction)    H/O exploratory laparotomy      PHYSICAL EXAM:    GENERAL: cachectic,  NAD  HEAD:  Atraumatic, Normocephalic  EYES: EOMI, PERRLA, conjunctiva and sclera clear  ENMT: No tonsillar erythema, exudates, or enlargement; Moist mucous membranes, Good dentition, No lesions  NECK: Supple, No JVD, Normal thyroid  NERVOUS SYSTEM:  Alert & Oriented X3,  Motor Strength 5/5 B/L upper, 4/5 BLE.   CHEST/LUNG: Unlabored. Clear to percussion bilaterally; No rales, rhonchi, wheezing, or rubs  HEART: Regular rate and rhythm; No murmurs, rubs, or gallops  ABDOMEN: Soft, Nontender, Nondistended; Bowel sounds present  EXTREMITIES:  2+ Peripheral Pulses, No clubbing, cyanosis, or edema  LYMPH: No lymphadenopathy noted  SKIN: Unstageable sacral DTI      LABS:  CBC Full  -  ( 26 Nov 2020 06:53 )  WBC Count : 7.35 K/uL  RBC Count : 3.06 M/uL  Hemoglobin : 8.7 g/dL  Hematocrit : 28.6 %  Platelet Count - Automated : 236 K/uL  Mean Cell Volume : 93.5 fl  Mean Cell Hemoglobin : 28.4 pg  Mean Cell Hemoglobin Concentration : 30.4 gm/dL  Auto Neutrophil # : x  Auto Lymphocyte # : x  Auto Monocyte # : x  Auto Eosinophil # : x  Auto Basophil # : x  Auto Neutrophil % : x  Auto Lymphocyte % : x  Auto Monocyte % : x  Auto Eosinophil % : x  Auto Basophil % : x    11-26    147<H>  |  114<H>  |  20<H>  ----------------------------<  76  3.2<L>   |  28  |  1.04    Ca    8.5      26 Nov 2020 06:53  Phos  3.6     11-26  Mg     2.0     11-26    TPro  6.3  /  Alb  2.1<L>  /  TBili  2.6<H>  /  DBili  x   /  AST  58<H>  /  ALT  40  /  AlkPhos  191<H>  11-26    [ x ]  DVT Prophylaxis      RADIOLOGY & ADDITIONAL STUDIES: Reviewed    11/24/20 : MR Pelvis Bony Only No Cont (11.24.20 @ 18:02) : Osteomyelitis of the lower sacrum and coccyx with overlying cutaneous ulceration,    11/19/20 : Xray Chest 1 View- PORTABLE-Urgent (Xray Chest 1 View- PORTABLE-Urgent .) (11.19.20 @ 23:53): Improvement in bilateral airspace disease with persistent bibasilar consolidation and small effusions.    11/4/20 : Xray Chest 1 View- PORTABLE-Routine (Xray Chest 1 View- PORTABLE-Routine in AM.) (11.04.20 @ 10:59) Reexpansion of the left lung with residual left pleural effusion and/or infiltrate.  Right pulmonary edema unchanged.  Tubes and catheters in satisfactory position.      10/25/20: Xray Chest 1 View- PORTABLE-Routine (Xray Chest 1 View- PORTABLE-Routine in AM.) (10.25.20 @ 09:21) Frontal expiratory view of the chest shows the heart to be similar in size. Endotracheal tube, right jugular line and feeding tube remain present. The lungs show similar left upper lobe infiltrate with progression of right perihilar infiltrate. Pleural effusions are similar. There is no evidence of pneumothorax.    10/21/20 : CT Abdomen and Pelvis w/ Oral Cont and w/ IV Cont (10.21.20 @ 15:59) Mural thickening of the left colon and rectum. Liquid stool in the colon. Apparent segmental mural thickening of the mid to distal small bowel and the proximal duodenum. Findings may represent nonspecific enterocolitis, noninfectious inflammatory bowel disease, or ischemic bowel. Clinical correlation is recommended. The celiac axis artery, SMA, and KINA are patent without stenosis.    Dilatation of the mid small bowel is again noted. Oral contrast has reached the terminal ileum. Findings may represent small bowel obstruction, or ileus related to nonspecific enterocolitis.   Cholelithiasis. Moderate to large ascites in the abdomen, increased since the previous examination. 5 mm nonspecific noncalcified left upper lobe lung nodule; if the patient's is in the high risk category (i.e. smoker), follow-up chest CT may be pursued in 12 months to ensure stability. Combination of atelectasis and consolidation in the left lower lobe.  Possible 1.0 cm hypodense lesion in the left lobe of the thyroid. Thyroid ultrasound may be pursued for further evaluation.   Mild bilateral pleural effusions.  Aging determinate compression fracture at T5 vertebra.          MICROBIOLOGY DATA:    Urine Microscopic-Add On (NC) (11.20.20 @ 04:12)   Red Blood Cell - Urine: 5-10 /HPF   White Blood Cell - Urine: 11-25 /HPF   Calcium Oxalate Crystals: Few /HPF   Bacteria: Moderate /HPF   Comment - Urine: few amorphous urates   Epithelial Cells: Few /HPF     Culture - Blood (11.19.20 @ 10:19)   Specimen Source: .Blood Blood-Peripheral   Culture Results: No growth to date.     Culture - Surgical Swab (11.12.20 @ 05:01)   - Ampicillin: R >8 Predicts results to ampicillin/sulbactam, amoxacillin-clavulanate and piperacillin-tazobactam.   - Levofloxacin: R >4   - Linezolid: S 1   - Tetra/Doxy: R >8   - Vancomycin: R >16   Specimen Source: .Surgical Swab Sacral decub   Culture Results:   Few Enterococcus faecium (vancomycin resistant)   Rare Candida albicans "Susceptibilities not performed"   Organism Identification: Enterococcus faecium (vancomycin resistant)   Organism: Enterococcus faecium (vancomycin resistant)   Method Type: STEWART     Culture - Surgical Swab (11.12.20 @ 05:01)   Specimen Source: .Surgical Swab Sacral decub   Culture Results:  Few Enterococcus faecium Susceptibility to follow.   Rare Candida albicans "Susceptibilities not performed"     Culture - Sputum . (10.26.20 @ 00:26)   - Ampicillin/Sulbactam: R <=8/4   - Cefazolin: R >16   - Ceftazidime: I 16   - Clindamycin: R >4   - Erythromycin: R >4   - Gentamicin: S <=1 Should not be used as monotherapy   - Levofloxacin: S <=0.5   - Linezolid: S 2   - Oxacillin: R >2   - Penicillin: R >8   - RIF- Rifampin: S <=1 Should not be used as monotherapy   - Tetra/Doxy: S <=1   - Trimethoprim/Sulfamethoxazole: S <=0.5/9.5   - Trimethoprim/Sulfamethoxazole: S <=0.5/9.5   - Vancomycin: S 1     MRSA/MSSA PCR (10.21.20 @ 09:41)   MRSA PCR Result.: Detected:       Culture - Blood (10.20.20 @ 22:09)   Specimen Source: .Blood Blood   Culture Results:  No growth to date.     Culture - Blood (10.20.20 @ 22:09)   Specimen Source: .Blood Blood   Culture Results:   No growth to date.     Culture - Blood in AM (10.16.20 @ 10:13)   Specimen Source: .Blood Blood-Peripheral   Culture Results: No growth to date.     Culture - Blood in AM (10.16.20 @ 10:13)   Specimen Source: .Blood Blood-Peripheral   Culture Results: No growth to date.     Culture - Blood (10.13.20 @ 22:23)   Growth in anaerobic bottle: Gram Negative Rods   Specimen Source: .Blood Blood-Peripheral   Organism: Blood Culture PCR   Culture Results: Growth in anaerobic bottle: Bacteroides fragilis   "Susceptibilities not performed"     Culture - Blood (10.13.20 @ 22:23)   Specimen Source: .Blood Blood-Peripheral   Culture Results:   No growth to date.     Goals of Care Discussion with Family/Proxy/Other   - see note from/family meeting : 11/18

## 2020-11-26 NOTE — PROGRESS NOTE ADULT - PROBLEM SELECTOR PLAN 1
Secondary to bacteremia . Bacteroides fragilis per BC on 10/13  Sputum positive for MRSA and Stenotrophomonas  Aspiration pneumonia vs HCAP   Repeat BC 10/16 and 11/19 NGTD   UC on 11/20 NGTD   Infected sacral ulcer- s/p debridement and culture grew Enterococcus (VRE) and Candida ( sensitivity is pending)  Leukocytosis resolved, Cdiff negative  on Dapto and Fluconazole  MRI sacrum/pelvis shows OM   Echo and PAVAN negative for endocarditis  PICC  for extended abx (for total of 6 weeks, thru Dec 28th). IR informed. PICC in IR likely Friday.   Monitor CK weekly while on Dapto  velazquez  discontinued 11/19.   Permafit as needed.   ID Dr. Patricio following

## 2020-11-26 NOTE — PROGRESS NOTE ADULT - SUBJECTIVE AND OBJECTIVE BOX
Patient was seen and examined  Patient is a 65y old  Female who presents with a chief complaint of Hypotension (25 Nov 2020 22:36)      INTERVAL HPI/OVERNIGHT EVENTS:  T(C): 36.9 (11-26-20 @ 05:00), Max: 36.9 (11-25-20 @ 20:38)  HR: 100 (11-26-20 @ 05:00) (100 - 105)  BP: 121/75 (11-26-20 @ 05:00) (121/75 - 132/83)  RR: 16 (11-26-20 @ 05:00) (16 - 20)  SpO2: 100% (11-26-20 @ 05:00) (100% - 100%)  Wt(kg): --  I&O's Summary      LABS:                        8.7    7.35  )-----------( 236      ( 26 Nov 2020 06:53 )             28.6     11-26    147<H>  |  114<H>  |  20<H>  ----------------------------<  76  3.2<L>   |  28  |  1.04    Ca    8.5      26 Nov 2020 06:53  Phos  3.6     11-26  Mg     2.0     11-26    TPro  6.3  /  Alb  2.1<L>  /  TBili  2.6<H>  /  DBili  x   /  AST  58<H>  /  ALT  40  /  AlkPhos  191<H>  11-26        CAPILLARY BLOOD GLUCOSE      POCT Blood Glucose.: 90 mg/dL (26 Nov 2020 06:10)  POCT Blood Glucose.: 101 mg/dL (25 Nov 2020 23:44)  POCT Blood Glucose.: 79 mg/dL (25 Nov 2020 11:51)              MEDICATIONS  (STANDING):  ascorbic acid 500 milliGRAM(s) Oral two times a day  cefepime   IVPB      cefepime   IVPB 1000 milliGRAM(s) IV Intermittent every 24 hours  DAPTOmycin IVPB 350 milliGRAM(s) IV Intermittent every 24 hours  enoxaparin Injectable 50 milliGRAM(s) SubCutaneous daily  fluconAZOLE IVPB 200 milliGRAM(s) IV Intermittent every 24 hours  fluconAZOLE IVPB      furosemide    Tablet 20 milliGRAM(s) Oral daily  multivitamin/minerals 1 Tablet(s) Oral daily  nystatin Powder 1 Application(s) Topical two times a day  pantoprazole   Suspension 40 milliGRAM(s) Enteral Tube daily  rifAXIMin 550 milliGRAM(s) Oral two times a day  zinc sulfate 220 milliGRAM(s) Oral daily    MEDICATIONS  (PRN):  acetaminophen   Tablet .. 650 milliGRAM(s) Oral every 6 hours PRN Temp greater or equal to 38C (100.4F)      RADIOLOGY & ADDITIONAL TESTS:    Imaging Personally Reviewed:  [ ] YES  [ ] NO    REVIEW OF SYSTEMS:  CONSTITUTIONAL: No fever, weight loss, or fatigue  EYES: No eye pain, visual disturbances, or discharge  ENMT:  No difficulty hearing, tinnitus, vertigo; No sinus or throat pain  NECK: No pain or stiffness  BREASTS: No pain, masses, or nipple discharge  RESPIRATORY: No cough, wheezing, chills or hemoptysis; No shortness of breath  CARDIOVASCULAR: No chest pain, palpitations, dizziness, or leg swelling  GASTROINTESTINAL: No abdominal or epigastric pain. No nausea, vomiting, or hematemesis; No diarrhea or constipation. No melena or hematochezia.  GENITOURINARY: No dysuria, frequency, hematuria, or incontinence  NEUROLOGICAL: No headaches, memory loss, loss of strength, numbness, or tremors  SKIN: No itching, burning, rashes, or lesions   LYMPH NODES: No enlarged glands  ENDOCRINE: No heat or cold intolerance; No hair loss  MUSCULOSKELETAL: No joint pain or swelling; No muscle, back, or extremity pain  PSYCHIATRIC: No depression, anxiety, mood swings, or difficulty sleeping  HEME/LYMPH: No easy bruising, or bleeding gums  ALLERY AND IMMUNOLOGIC: No hives or eczema      Consultant(s) Notes Reviewed:  [ x ] YES  [ ] NO    PHYSICAL EXAM:  GENERAL: NAD, well-groomed, well-developed  HEAD:  Atraumatic, Normocephalic  EYES: EOMI, PERRLA, conjunctiva and sclera clear  ENMT: No tonsillar erythema, exudates, or enlargement; Moist mucous membranes, Good dentition, No lesions  NECK: Supple, No JVD, Normal thyroid  NERVOUS SYSTEM:  Alert & Oriented X3, Good concentration; Motor Strength 5/5 B/L upper and lower extremities; DTRs 2+ intact and symmetric  CHEST/LUNG: Clear to percussion bilaterally; No rales, rhonchi, wheezing, or rubs  HEART: Regular rate and rhythm; No murmurs, rubs, or gallops  ABDOMEN: Soft, Nontender, Nondistended; Bowel sounds present  EXTREMITIES:  2+ Peripheral Pulses, No clubbing, cyanosis, or edema  LYMPH: No lymphadenopathy noted  SKIN: No rashes or lesions    Care Discussed with Consultants/Other Providers [ x] YES  [ ] NO

## 2020-11-26 NOTE — PROGRESS NOTE ADULT - PROBLEM SELECTOR PLAN 5
Stage IV/unstageable  S/P sacral wound debridement  No active infection per Surgery `  MRI (+) OM  Continue fluconazole as per ID.   con't Daptomycin (ordered 11/17) for total of 6 weeks (thru Dec 28th).  PICC placement in IR , likely Friday. Hold Lovenox on Friday.   Monitor CK while on Dapto   Continue wound care as ordered.  Turn every 2 hours.  OOB daily.

## 2020-11-27 NOTE — PROGRESS NOTE ADULT - SUBJECTIVE AND OBJECTIVE BOX
Patient is seen and examined at the bed side, is afebrile now.  She is tolerating Daptomycin well. The WBC  count stay normal.      REVIEW OF SYSTEMS: All other review systems are negative      ALLERGIES: No Known Allergies      Vital Signs Last 24 Hrs  T(C): 36.4 (27 Nov 2020 14:47), Max: 38.3 (27 Nov 2020 05:10)  T(F): 97.6 (27 Nov 2020 14:47), Max: 101 (27 Nov 2020 05:10)  HR: 101 (27 Nov 2020 14:47) (101 - 124)  BP: 132/85 (27 Nov 2020 14:47) (126/79 - 132/85)  BP(mean): --  RR: 18 (27 Nov 2020 14:47) (16 - 18)  SpO2: 99% (27 Nov 2020 14:47) (99% - 100%)        PHYSICAL EXAM:  GENERAL: Not in distress, on oxygen via NC  CHEST/LUNG: Not using accessory muscles   HEART: s1 and s2 present  ABDOMEN:  Nontender and  Nondistended  EXTREMITIES: B/L UE edematous improved  CNS: Awake and Alert         LABS:                        9.0    7.76  )-----------( 283      ( 27 Nov 2020 07:43 )             29.5                           9.9    9.41  )-----------( 269      ( 24 Nov 2020 06:38 )             31.5                           9.4    13.14 )-----------( 149      ( 14 Nov 2020 07:58 )             28.7         11-27    149<H>  |  115<H>  |  20<H>  ----------------------------<  94  4.0   |  28  |  1.02    Ca    8.5      27 Nov 2020 07:43  Phos  3.1     11-27  Mg     1.8     11-27    TPro  6.6  /  Alb  2.2<L>  /  TBili  2.6<H>  /  DBili  x   /  AST  68<H>  /  ALT  48  /  AlkPhos  235<H>  11-27 11-25    146<H>  |  115<H>  |  21<H>  ----------------------------<  86  3.5   |  23  |  1.13    Ca    8.3<L>      25 Nov 2020 07:26  Phos  3.2     11-25  Mg     1.9     11-25    TPro  6.9  /  Alb  2.1<L>  /  TBili  2.9<H>  /  DBili  x   /  AST  55<H>  /  ALT  39  /  AlkPhos  200<H>  11-25 11-23    148<H>  |  115<H>  |  24<H>  ----------------------------<  67<L>  4.1   |  25  |  1.21    Ca    7.5<L>      23 Nov 2020 06:35  Phos  2.8     11-23  Mg     2.2     11-23    TPro  6.2  /  Alb  2.1<L>  /  TBili  2.8<H>  /  DBili  x   /  AST  48<H>  /  ALT  35  /  AlkPhos  162<H>  11-23 11-06    138  |  104  |  37<H>  ----------------------------<  81  3.9   |  25  |  2.37<H>    Ca    8.1<L>      06 Nov 2020 06:20  Phos  3.4     11-06  Mg     2.0     11-06    TPro  5.1<L>  /  Alb  2.8<L>  /  TBili  9.2<H>  /  DBili  x   /  AST  233<H>  /  ALT  99<H>  /  AlkPhos  96  11-06      Vancomycin Level, Trough (11.07.20 @ 21:49)   Vancomycin Level, Trough: 16.1:     Vancomycin Level, Trough (11.07.20 @ 07:08)   Vancomycin Level, Trough: 19.5    vanVancomycin Level, Trough (11.06.20 @ 06:20)   Vancomycin Level, Trough: 19.9:         MEDICATIONS  (STANDING):    ascorbic acid 500 milliGRAM(s) Oral two times a day  cefepime   IVPB      cefepime   IVPB 1000 milliGRAM(s) IV Intermittent every 24 hours  DAPTOmycin IVPB 350 milliGRAM(s) IV Intermittent every 24 hours  enoxaparin Injectable 50 milliGRAM(s) SubCutaneous daily  fluconAZOLE IVPB 200 milliGRAM(s) IV Intermittent every 24 hours  fluconAZOLE IVPB      furosemide    Tablet 20 milliGRAM(s) Oral daily  multivitamin/minerals 1 Tablet(s) Oral daily  nystatin Powder 1 Application(s) Topical two times a day  pantoprazole   Suspension 40 milliGRAM(s) Enteral Tube daily  rifAXIMin 550 milliGRAM(s) Oral two times a day  zinc sulfate 220 milliGRAM(s) Oral daily      RADIOLOGY & ADDITIONAL TESTS:      11/24/20 : MR Pelvis Bony Only No Cont (11.24.20 @ 18:02) : Osteomyelitis of the lower sacrum and coccyx with overlying cutaneous ulceration,      11/19/20 : Xray Chest 1 View- PORTABLE-Urgent (Xray Chest 1 View- PORTABLE-Urgent .) (11.19.20 @ 23:53): Improvement in bilateral airspace disease with persistent bibasilar consolidation and small effusions.    11/4/20 : Xray Chest 1 View- PORTABLE-Routine (Xray Chest 1 View- PORTABLE-Routine in AM.) (11.04.20 @ 10:59) Reexpansion of the left lung with residual left pleural effusion and/or infiltrate.  Right pulmonary edema unchanged.  Tubes and catheters in satisfactory position.      10/25/20: Xray Chest 1 View- PORTABLE-Routine (Xray Chest 1 View- PORTABLE-Routine in AM.) (10.25.20 @ 09:21) Frontal expiratory view of the chest shows the heart to be similar in size. Endotracheal tube, right jugular line and feeding tube remain present. The lungs show similar left upper lobe infiltrate with progression of right perihilar infiltrate. Pleural effusions are similar. There is no evidence of pneumothorax.    10/21/20 : CT Abdomen and Pelvis w/ Oral Cont and w/ IV Cont (10.21.20 @ 15:59) Mural thickening of the left colon and rectum. Liquid stool in the colon. Apparent segmental mural thickening of the mid to distal small bowel and the proximal duodenum. Findings may represent nonspecific enterocolitis, noninfectious inflammatory bowel disease, or ischemic bowel. Clinical correlation is recommended. The celiac axis artery, SMA, and KINA are patent without stenosis.    Dilatation of the mid small bowel is again noted. Oral contrast has reached the terminal ileum. Findings may represent small bowel obstruction, or ileus related to nonspecific enterocolitis.   Cholelithiasis. Moderate to large ascites in the abdomen, increased since the previous examination. 5 mm nonspecific noncalcified left upper lobe lung nodule; if the patient's is in the high risk category (i.e. smoker), follow-up chest CT may be pursued in 12 months to ensure stability. Combination of atelectasis and consolidation in the left lower lobe.  Possible 1.0 cm hypodense lesion in the left lobe of the thyroid. Thyroid ultrasound may be pursued for further evaluation.   Mild bilateral pleural effusions.  Aging determinate compression fracture at T5 vertebra.          MICROBIOLOGY DATA:    Urine Microscopic-Add On (NC) (11.20.20 @ 04:12)   Red Blood Cell - Urine: 5-10 /HPF   White Blood Cell - Urine: 11-25 /HPF   Calcium Oxalate Crystals: Few /HPF   Bacteria: Moderate /HPF   Comment - Urine: few amorphous urates   Epithelial Cells: Few /HPF     Culture - Blood (11.19.20 @ 10:19)   Specimen Source: .Blood Blood-Peripheral   Culture Results: No growth to date.     Culture - Surgical Swab (11.12.20 @ 05:01)   - Ampicillin: R >8 Predicts results to ampicillin/sulbactam, amoxacillin-clavulanate and piperacillin-tazobactam.   - Levofloxacin: R >4   - Linezolid: S 1   - Tetra/Doxy: R >8   - Vancomycin: R >16   Specimen Source: .Surgical Swab Sacral decub   Culture Results:   Few Enterococcus faecium (vancomycin resistant)   Rare Candida albicans "Susceptibilities not performed"   Organism Identification: Enterococcus faecium (vancomycin resistant)   Organism: Enterococcus faecium (vancomycin resistant)   Method Type: STEWART     Culture - Surgical Swab (11.12.20 @ 05:01)   Specimen Source: .Surgical Swab Sacral decub   Culture Results:  Few Enterococcus faecium Susceptibility to follow.   Rare Candida albicans "Susceptibilities not performed"     Culture - Sputum . (10.26.20 @ 00:26)   - Ampicillin/Sulbactam: R <=8/4   - Cefazolin: R >16   - Ceftazidime: I 16   - Clindamycin: R >4   - Erythromycin: R >4   - Gentamicin: S <=1 Should not be used as monotherapy   - Levofloxacin: S <=0.5   - Linezolid: S 2   - Oxacillin: R >2   - Penicillin: R >8   - RIF- Rifampin: S <=1 Should not be used as monotherapy   - Tetra/Doxy: S <=1   - Trimethoprim/Sulfamethoxazole: S <=0.5/9.5   - Trimethoprim/Sulfamethoxazole: S <=0.5/9.5   - Vancomycin: S 1     MRSA/MSSA PCR (10.21.20 @ 09:41)   MRSA PCR Result.: Detected:       Culture - Blood (10.20.20 @ 22:09)   Specimen Source: .Blood Blood   Culture Results:  No growth to date.     Culture - Blood (10.20.20 @ 22:09)   Specimen Source: .Blood Blood   Culture Results:   No growth to date.     Culture - Blood in AM (10.16.20 @ 10:13)   Specimen Source: .Blood Blood-Peripheral   Culture Results: No growth to date.     Culture - Blood in AM (10.16.20 @ 10:13)   Specimen Source: .Blood Blood-Peripheral   Culture Results: No growth to date.     Culture - Blood (10.13.20 @ 22:23)   Growth in anaerobic bottle: Gram Negative Rods   Specimen Source: .Blood Blood-Peripheral   Organism: Blood Culture PCR   Culture Results: Growth in anaerobic bottle: Bacteroides fragilis   "Susceptibilities not performed"     Culture - Blood (10.13.20 @ 22:23)   Specimen Source: .Blood Blood-Peripheral   Culture Results:   No growth to date.              Patient is seen and examined at the bed side, having intermittent fever. The WBC  count stay normal. The CXR shows Increased interstitial lung markings with bilateral consolidations right greater than left.        REVIEW OF SYSTEMS: All other review systems are negative        ALLERGIES: No Known Allergies      Vital Signs Last 24 Hrs  T(C): 36.4 (27 Nov 2020 14:47), Max: 38.3 (27 Nov 2020 05:10)  T(F): 97.6 (27 Nov 2020 14:47), Max: 101 (27 Nov 2020 05:10)  HR: 101 (27 Nov 2020 14:47) (101 - 124)  BP: 132/85 (27 Nov 2020 14:47) (126/79 - 132/85)  BP(mean): --  RR: 18 (27 Nov 2020 14:47) (16 - 18)  SpO2: 99% (27 Nov 2020 14:47) (99% - 100%)        PHYSICAL EXAM:  GENERAL: Not in distress, on oxygen via NC  CHEST/LUNG: Not using accessory muscles   HEART: s1 and s2 present  ABDOMEN:  Nontender and  Nondistended  EXTREMITIES: B/L UE edematous improved  CNS: Awake and Alert         LABS:                        9.0    7.76  )-----------( 283      ( 27 Nov 2020 07:43 )             29.5                           9.9    9.41  )-----------( 269      ( 24 Nov 2020 06:38 )             31.5                           9.4    13.14 )-----------( 149      ( 14 Nov 2020 07:58 )             28.7         11-27    149<H>  |  115<H>  |  20<H>  ----------------------------<  94  4.0   |  28  |  1.02    Ca    8.5      27 Nov 2020 07:43  Phos  3.1     11-27  Mg     1.8     11-27    TPro  6.6  /  Alb  2.2<L>  /  TBili  2.6<H>  /  DBili  x   /  AST  68<H>  /  ALT  48  /  AlkPhos  235<H>  11-27 11-25    146<H>  |  115<H>  |  21<H>  ----------------------------<  86  3.5   |  23  |  1.13    Ca    8.3<L>      25 Nov 2020 07:26  Phos  3.2     11-25  Mg     1.9     11-25    TPro  6.9  /  Alb  2.1<L>  /  TBili  2.9<H>  /  DBili  x   /  AST  55<H>  /  ALT  39  /  AlkPhos  200<H>  11-25 11-06    138  |  104  |  37<H>  ----------------------------<  81  3.9   |  25  |  2.37<H>    Ca    8.1<L>      06 Nov 2020 06:20  Phos  3.4     11-06  Mg     2.0     11-06    TPro  5.1<L>  /  Alb  2.8<L>  /  TBili  9.2<H>  /  DBili  x   /  AST  233<H>  /  ALT  99<H>  /  AlkPhos  96  11-06      Vancomycin Level, Trough (11.07.20 @ 21:49)   Vancomycin Level, Trough: 16.1:     Vancomycin Level, Trough (11.07.20 @ 07:08)   Vancomycin Level, Trough: 19.5    Vancomycin Level, Trough (11.06.20 @ 06:20)   Vancomycin Level, Trough: 19.9:         MEDICATIONS  (STANDING):    ascorbic acid 500 milliGRAM(s) Oral two times a day  cefepime   IVPB      cefepime   IVPB 1000 milliGRAM(s) IV Intermittent every 24 hours  DAPTOmycin IVPB 350 milliGRAM(s) IV Intermittent every 24 hours  enoxaparin Injectable 50 milliGRAM(s) SubCutaneous daily  fluconAZOLE IVPB 200 milliGRAM(s) IV Intermittent every 24 hours  fluconAZOLE IVPB      furosemide    Tablet 20 milliGRAM(s) Oral daily  multivitamin/minerals 1 Tablet(s) Oral daily  nystatin Powder 1 Application(s) Topical two times a day  pantoprazole   Suspension 40 milliGRAM(s) Enteral Tube daily  rifAXIMin 550 milliGRAM(s) Oral two times a day  zinc sulfate 220 milliGRAM(s) Oral daily      RADIOLOGY & ADDITIONAL TESTS:    11/27/20 : Xray Chest 1 View- PORTABLE-Urgent (Xray Chest 1 View- PORTABLE-Urgent .) (11.27.20 @ 06:53) Increased interstitial lung markings with bilateral consolidations right greater than left. Small right pleural effusion. Overall worsening compared to prior exam.      11/24/20 : MR Pelvis Bony Only No Cont (11.24.20 @ 18:02) : Osteomyelitis of the lower sacrum and coccyx with overlying cutaneous ulceration,      11/19/20 : Xray Chest 1 View- PORTABLE-Urgent (Xray Chest 1 View- PORTABLE-Urgent .) (11.19.20 @ 23:53): Improvement in bilateral airspace disease with persistent bibasilar consolidation and small effusions.    11/4/20 : Xray Chest 1 View- PORTABLE-Routine (Xray Chest 1 View- PORTABLE-Routine in AM.) (11.04.20 @ 10:59) Reexpansion of the left lung with residual left pleural effusion and/or infiltrate.  Right pulmonary edema unchanged.  Tubes and catheters in satisfactory position.      10/25/20: Xray Chest 1 View- PORTABLE-Routine (Xray Chest 1 View- PORTABLE-Routine in AM.) (10.25.20 @ 09:21) Frontal expiratory view of the chest shows the heart to be similar in size. Endotracheal tube, right jugular line and feeding tube remain present. The lungs show similar left upper lobe infiltrate with progression of right perihilar infiltrate. Pleural effusions are similar. There is no evidence of pneumothorax.    10/21/20 : CT Abdomen and Pelvis w/ Oral Cont and w/ IV Cont (10.21.20 @ 15:59) Mural thickening of the left colon and rectum. Liquid stool in the colon. Apparent segmental mural thickening of the mid to distal small bowel and the proximal duodenum. Findings may represent nonspecific enterocolitis, noninfectious inflammatory bowel disease, or ischemic bowel. Clinical correlation is recommended. The celiac axis artery, SMA, and KINA are patent without stenosis.    Dilatation of the mid small bowel is again noted. Oral contrast has reached the terminal ileum. Findings may represent small bowel obstruction, or ileus related to nonspecific enterocolitis.   Cholelithiasis. Moderate to large ascites in the abdomen, increased since the previous examination. 5 mm nonspecific noncalcified left upper lobe lung nodule; if the patient's is in the high risk category (i.e. smoker), follow-up chest CT may be pursued in 12 months to ensure stability. Combination of atelectasis and consolidation in the left lower lobe.  Possible 1.0 cm hypodense lesion in the left lobe of the thyroid. Thyroid ultrasound may be pursued for further evaluation.   Mild bilateral pleural effusions.  Aging determinate compression fracture at T5 vertebra.          MICROBIOLOGY DATA:    Urine Microscopic-Add On (NC) (11.20.20 @ 04:12)   Red Blood Cell - Urine: 5-10 /HPF   White Blood Cell - Urine: 11-25 /HPF   Calcium Oxalate Crystals: Few /HPF   Bacteria: Moderate /HPF   Comment - Urine: few amorphous urates   Epithelial Cells: Few /HPF     Culture - Blood (11.19.20 @ 10:19)   Specimen Source: .Blood Blood-Peripheral   Culture Results: No growth to date.     Culture - Surgical Swab (11.12.20 @ 05:01)   - Ampicillin: R >8 Predicts results to ampicillin/sulbactam, amoxacillin-clavulanate and piperacillin-tazobactam.   - Levofloxacin: R >4   - Linezolid: S 1   - Tetra/Doxy: R >8   - Vancomycin: R >16   Specimen Source: .Surgical Swab Sacral decub   Culture Results:   Few Enterococcus faecium (vancomycin resistant)   Rare Candida albicans "Susceptibilities not performed"   Organism Identification: Enterococcus faecium (vancomycin resistant)   Organism: Enterococcus faecium (vancomycin resistant)   Method Type: STEWART     Culture - Surgical Swab (11.12.20 @ 05:01)   Specimen Source: .Surgical Swab Sacral decub   Culture Results:  Few Enterococcus faecium Susceptibility to follow.   Rare Candida albicans "Susceptibilities not performed"     Culture - Sputum . (10.26.20 @ 00:26)   - Ampicillin/Sulbactam: R <=8/4   - Cefazolin: R >16   - Ceftazidime: I 16   - Clindamycin: R >4   - Erythromycin: R >4   - Gentamicin: S <=1 Should not be used as monotherapy   - Levofloxacin: S <=0.5   - Linezolid: S 2   - Oxacillin: R >2   - Penicillin: R >8   - RIF- Rifampin: S <=1 Should not be used as monotherapy   - Tetra/Doxy: S <=1   - Trimethoprim/Sulfamethoxazole: S <=0.5/9.5   - Trimethoprim/Sulfamethoxazole: S <=0.5/9.5   - Vancomycin: S 1     MRSA/MSSA PCR (10.21.20 @ 09:41)   MRSA PCR Result.: Detected:       Culture - Blood (10.20.20 @ 22:09)   Specimen Source: .Blood Blood   Culture Results:  No growth to date.     Culture - Blood (10.20.20 @ 22:09)   Specimen Source: .Blood Blood   Culture Results:   No growth to date.     Culture - Blood in AM (10.16.20 @ 10:13)   Specimen Source: .Blood Blood-Peripheral   Culture Results: No growth to date.     Culture - Blood in AM (10.16.20 @ 10:13)   Specimen Source: .Blood Blood-Peripheral   Culture Results: No growth to date.     Culture - Blood (10.13.20 @ 22:23)   Growth in anaerobic bottle: Gram Negative Rods   Specimen Source: .Blood Blood-Peripheral   Organism: Blood Culture PCR   Culture Results: Growth in anaerobic bottle: Bacteroides fragilis   "Susceptibilities not performed"     Culture - Blood (10.13.20 @ 22:23)   Specimen Source: .Blood Blood-Peripheral   Culture Results:   No growth to date.

## 2020-11-27 NOTE — PROGRESS NOTE ADULT - ASSESSMENT
65y Female from home, lives with daughter, ambulates with walker with PMH of recurrent SBO's, s/p exp lap, SB resection in 2015, ex lap, ALBINA in 2018, DVT, PE, on Xarelto, IVC filter, chronic leg swelling, and anasarca, came in to the ED due to hypotension during office visit. Patient was found to be bacteremic with bacteroids fragilis. Admitted in ICU due to hypotension and hypothermia. patient completed course of antibiotics . BCx X2 negative. Ammonia level elevated and patient refused NGT placement, mental status deteriorated and patient desaturated to high 70%, patient got intubated on 10/24 . Sputum was positive for MRSA and Stenotrophomonas. patient started on vancomycin and Levaquin. patient was given lactulose for high ammonia level. kidney function and liver function started to deteriorate and patient was leaning toward multi organ failure . high dose of Lasix started and patient was aggressively diuresed. Kidney function improved . Ammonia level dropped to less than 10.  Mental status improved and patient extubated on 11/3/2020. Patient was seen by wound specialist and decubitus ulcer debrided. patient had a drop in h/h s/p debridement . received 1 unit PRBC .     11/12  Transferred from ICU to SCU  `11/17  Midodrine d/c secondary to daptomycin being started.  11/18 Febrile, Tmax 101.5. Repeat BC sent.   11/19 Discontinue velazquez.   11/20 febrile overnight.   11/22 s/p 2 units PRBC for Hgb 6.4  11/24 MRI pelvis (+) OM  11/25 PAVAN no endocarditis. Awaiting PICC  11/27 Spiked fever of 101F; Repeated cultures, chest xray, lactate, Follow up results. IR Denies PICC Placement due to fever. Plan for IR Monday (NPO Sunday) if afebrile over the weekend. 65y Female from home, lives with daughter, ambulates with walker with PMH of recurrent SBO's, s/p exp lap, SB resection in 2015, ex lap, ALBINA in 2018, DVT, PE, on Xarelto, IVC filter, chronic leg swelling, and anasarca, came in to the ED due to hypotension during office visit. Patient was found to be bacteremic with bacteroids fragilis. Admitted in ICU due to hypotension and hypothermia. patient completed course of antibiotics . BCx X2 negative. Ammonia level elevated and patient refused NGT placement, mental status deteriorated and patient desaturated to high 70%, patient got intubated on 10/24 . Sputum was positive for MRSA and Stenotrophomonas. patient started on vancomycin and Levaquin. patient was given lactulose for high ammonia level. kidney function and liver function started to deteriorate and patient was leaning toward multi organ failure . high dose of Lasix started and patient was aggressively diuresed. Kidney function improved . Ammonia level dropped to less than 10.  Mental status improved and patient extubated on 11/3/2020. Patient was seen by wound specialist and decubitus ulcer debrided. patient had a drop in h/h s/p debridement . received 1 unit PRBC .     11/12  Transferred from ICU to SCU  `11/17  Midodrine d/c secondary to daptomycin being started.  11/18 Febrile, Tmax 101.5. Repeat BC sent.   11/19 Discontinue velazquez.   11/20 febrile overnight.   11/22 s/p 2 units PRBC for Hgb 6.4  11/24 MRI pelvis (+) OM  11/25 PAVAN no endocarditis. Awaiting PICC  11/27 Spiked fever of 101F; Repeated cultures, chest xray, lactate, Follow up results. IR Denies PICC Placement due to fever. Plan for IR Monday (NPO Sunday) if afebrile over the weekend. Given one dose of lasix IV today for pleural effusion on chest xray; Give one more dose of IV Lasix 40mg tomorrow at 2pm if BP stable. Continue with daily lasix.

## 2020-11-27 NOTE — CHART NOTE - NSCHARTNOTEFT_GEN_A_CORE
Assessment:   65yFemalePatient is a 65y old  Female who presents with a chief complaint of Hypotension (27 Nov 2020 11:01). Pt visited. Pt  is on O2. Pt is fed by staff. Observed  B  fast meal ot ate Good ~75 % of meal. Pt is also on Two papi HN   Tid and On Ensure pudding BID. S/P sacral wound debridement. Meds noted on Mvim, vitc, zn.   Pt did not verbalize any updated food choices.       Factors impacting intake: [ ] none [ ] nausea  [ ] vomiting [ ] diarrhea [ ] constipation  [ ]chewing problems [ ] swallowing issues  [ ] other:     Diet Prescription: Diet, Dysphagia 1 Pureed-Nectar Consistency Fluid:   Supplement Feeding Modality:  Oral  Two Papi HN Cans or Servings Per Day:  3       Frequency:  Three Times a day  Ensure Pudding Cans or Servings Per Day:  2       Frequency:  Two Times a day (11-13-20 @ 13:46)    Intake:  ~> 50 %     Current Weight:   % Weight Change Bed scale 110 bed scale with out scd device     Pertinent Medications: MEDICATIONS  (STANDING):  ascorbic acid 500 milliGRAM(s) Oral two times a day  cefepime   IVPB      cefepime   IVPB 1000 milliGRAM(s) IV Intermittent every 24 hours  DAPTOmycin IVPB 350 milliGRAM(s) IV Intermittent every 24 hours  enoxaparin Injectable 50 milliGRAM(s) SubCutaneous daily  fluconAZOLE IVPB 200 milliGRAM(s) IV Intermittent every 24 hours  fluconAZOLE IVPB      furosemide    Tablet 20 milliGRAM(s) Oral daily  multivitamin/minerals 1 Tablet(s) Oral daily  nystatin Powder 1 Application(s) Topical two times a day  pantoprazole   Suspension 40 milliGRAM(s) Enteral Tube daily  rifAXIMin 550 milliGRAM(s) Oral two times a day  zinc sulfate 220 milliGRAM(s) Oral daily    MEDICATIONS  (PRN):  acetaminophen   Tablet .. 650 milliGRAM(s) Oral every 6 hours PRN Temp greater or equal to 38C (100.4F)    Pertinent Labs: 11-27 Na149 mmol/L<H> Glu 94 mg/dL K+ 4.0 mmol/L Cr  1.02 mg/dL BUN 20 mg/dL<H> 11-27 Phos 3.1 mg/dL 11-27 Alb 2.2 g/dL<L> 10-31 Chol --    LDL --    HDL --    Trig 87 mg/dL     CAPILLARY BLOOD GLUCOSE      POCT Blood Glucose.: 95 mg/dL (27 Nov 2020 05:40)  POCT Blood Glucose.: 137 mg/dL (26 Nov 2020 23:43)    Skin:  DTI @ coccyx, Unstageable @ Sacrum    Estimated Needs:   [ ] no change since previous assessment  [ ] recalculated:     Previous Nutrition Diagnosis:   [ ] Inadequate Energy Intake [ ]Inadequate Oral Intake [ ] Excessive Energy Intake   [ ] Underweight [ ] Increased Nutrient Needs [ ] Overweight/Obesity   [ ] Altered GI Function [ ] Unintended Weight Loss [ ] Food & Nutrition Related Knowledge Deficit [x ] Malnutrition severe    Nutrition Diagnosis is [x ] ongoing  [ ] resolved [ ] not applicable     New Nutrition Diagnosis: [ ] not applicable       Interventions:   Recommend  [ ] Change Diet To:  [x ] Nutrition Supplement continue with TWo papi HN TID W Ensure pudding bid.  [ ] Nutrition Support  [x ] Other: Continue to feed Pt     Monitoring and Evaluation:   [ ] PO intake [ x ] Tolerance to diet prescription [ x ] weights [ x ] labs[ x ] follow up per protocol  [ ] other:

## 2020-11-27 NOTE — PROGRESS NOTE ADULT - SUBJECTIVE AND OBJECTIVE BOX
MARIBETH PADILLA    SCU progress note    INTERVAL HPI/OVERNIGHT EVENTS: Unable to go to IR today as IR would not proceed for procedure due to spike in temperature of 101F overnight. Patient remains on Cefepime, Daptomycin, Flucanazole. Chest xray ordered this morning. Repeat cultures ordered. Pending results. Seen and examined this morning, denies shortness of breath, reports feeling "fine" and "better".       DNR [ x]   DNI  [  ]  DNR / trial intubation/ trial feeding tube / trial IV fluids / use abx.    Covid - 19 PCR:   COVID-19 PCR: NotDetec (17 Nov 2020 08:05)  SARS-CoV-2: NotDete (13 Oct 2020 16:00)    The 4Ms  What Matters Most: see GOC  Age appropriate Medications/Screen for High Risk Medication: Yes  Mentation: see CAM below  Mobility: defer to physical exam    The Confusion Assessment Method (CAM) Diagnostic Algorithm     1: Acute Onset or Fluctuating Course  - Is there evidence of an acute change in mental status from the patient’s baseline? Did the (abnormal) behavior  fluctuate during the day, that is, tend to come and go, or increase and decrease in severity?  [ ] YES [ x] NO     2: Inattention  - Did the patient have difficulty focusing attention, being easily distractible, or having difficulty keeping track of what was being said?  [ ] YES [ x] NO     3: Disorganized thinking  -Was the patient’s thinking disorganized or incoherent, such as rambling or irrelevant conversation, unclear or illogical flow of ideas, or unpredictable switching from subject to subject?  [ ] YES [ x] NO    4: Altered Level of consciousness?  [ ] YES [x ] NO    The diagnosis of delirium by CAM requires the presence of features 1 and 2 and either 3 or 4.    MEDICATIONS  (STANDING):  ascorbic acid 500 milliGRAM(s) Oral two times a day  cefepime   IVPB      cefepime   IVPB 1000 milliGRAM(s) IV Intermittent every 24 hours  DAPTOmycin IVPB 350 milliGRAM(s) IV Intermittent every 24 hours  enoxaparin Injectable 50 milliGRAM(s) SubCutaneous daily  fluconAZOLE IVPB 200 milliGRAM(s) IV Intermittent every 24 hours  fluconAZOLE IVPB      furosemide    Tablet 20 milliGRAM(s) Oral daily  multivitamin/minerals 1 Tablet(s) Oral daily  nystatin Powder 1 Application(s) Topical two times a day  pantoprazole   Suspension 40 milliGRAM(s) Enteral Tube daily  rifAXIMin 550 milliGRAM(s) Oral two times a day  zinc sulfate 220 milliGRAM(s) Oral daily    MEDICATIONS  (PRN):  acetaminophen   Tablet .. 650 milliGRAM(s) Oral every 6 hours PRN Temp greater or equal to 38C (100.4F)    PRESSORS: [ ] YES [x ] NO  MEDICATIONS  (STANDING):Drug Dosing Weight  Height (cm): 167.6 (21 Oct 2020 02:26)  Weight (kg): 56.2 (21 Oct 2020 02:26)  BMI (kg/m2): 20 (21 Oct 2020 02:26)  BSA (m2): 1.63 (21 Oct 2020 02:26)    CENTRAL LINE: [ ] YES [ x] NO  LOCATION:   DATE INSERTED:  REMOVE: [ ] YES [ ] NO  EXPLAIN:    TREJO: [ ] YES [x] NO    DATE INSERTED:  REMOVE:  [ ] YES [ ] NO  EXPLAIN:    PAST MEDICAL & SURGICAL HISTORY:  Anasarca    Pulmonary embolism    DVT (deep venous thrombosis)    SBO (small bowel obstruction)    H/O exploratory laparotomy    ICU Vital Signs Last 24 Hrs  T(C): 37.1 (27 Nov 2020 07:01), Max: 38.3 (27 Nov 2020 05:10)  T(F): 98.7 (27 Nov 2020 07:01), Max: 101 (27 Nov 2020 05:10)  HR: 124 (27 Nov 2020 05:10) (92 - 124)  BP: 126/79 (27 Nov 2020 05:10) (122/75 - 128/79)  BP(mean): --  ABP: --  ABP(mean): --  RR: 18 (27 Nov 2020 05:10) (16 - 18)  SpO2: 99% (27 Nov 2020 05:10) (99% - 100%)    PHYSICAL EXAM:    GENERAL: cachectic,  NAD  HEAD:  Atraumatic, Normocephalic  EYES: EOMI, PERRLA, conjunctiva and sclera clear  ENMT: No tonsillar erythema, exudates, or enlargement; Moist mucous membranes, Good dentition, No lesions  NECK: Supple, No JVD, Normal thyroid  NERVOUS SYSTEM:  Alert & Oriented X3,  Motor Strength 5/5 B/L upper, 4/5 BLE.   CHEST/LUNG: Unlabored. Clear to percussion bilaterally; No rales, rhonchi, wheezing, or rubs  HEART: Regular rate and rhythm; No murmurs, rubs, or gallops  ABDOMEN: Soft, Nontender, Nondistended; Bowel sounds present  EXTREMITIES:  2+ Peripheral Pulses, No clubbing, cyanosis, or edema  LYMPH: No lymphadenopathy noted  SKIN: Unstageable sacral DTI      LABS:  CBC Full  -  ( 27 Nov 2020 07:43 )  WBC Count : 7.76 K/uL  RBC Count : 3.12 M/uL  Hemoglobin : 9.0 g/dL  Hematocrit : 29.5 %  Platelet Count - Automated : 283 K/uL  Mean Cell Volume : 94.6 fl  Mean Cell Hemoglobin : 28.8 pg  Mean Cell Hemoglobin Concentration : 30.5 gm/dL  Auto Neutrophil # : x  Auto Lymphocyte # : x  Auto Monocyte # : x  Auto Eosinophil # : x  Auto Basophil # : x  Auto Neutrophil % : x  Auto Lymphocyte % : x  Auto Monocyte % : x  Auto Eosinophil % : x  Auto Basophil % : x    11-27    149<H>  |  115<H>  |  20<H>  ----------------------------<  94  4.0   |  28  |  1.02    Ca    8.5      27 Nov 2020 07:43  Phos  3.1     11-27  Mg     1.8     11-27    TPro  6.6  /  Alb  2.2<L>  /  TBili  2.6<H>  /  DBili  x   /  AST  68<H>  /  ALT  48  /  AlkPhos  235<H>  11-27    [ x ]  DVT Prophylaxis      RADIOLOGY & ADDITIONAL STUDIES: Reviewed    11/24/20 : MR Pelvis Bony Only No Cont (11.24.20 @ 18:02) : Osteomyelitis of the lower sacrum and coccyx with overlying cutaneous ulceration,    11/19/20 : Xray Chest 1 View- PORTABLE-Urgent (Xray Chest 1 View- PORTABLE-Urgent .) (11.19.20 @ 23:53): Improvement in bilateral airspace disease with persistent bibasilar consolidation and small effusions.    11/4/20 : Xray Chest 1 View- PORTABLE-Routine (Xray Chest 1 View- PORTABLE-Routine in AM.) (11.04.20 @ 10:59) Reexpansion of the left lung with residual left pleural effusion and/or infiltrate.  Right pulmonary edema unchanged.  Tubes and catheters in satisfactory position.      10/25/20: Xray Chest 1 View- PORTABLE-Routine (Xray Chest 1 View- PORTABLE-Routine in AM.) (10.25.20 @ 09:21) Frontal expiratory view of the chest shows the heart to be similar in size. Endotracheal tube, right jugular line and feeding tube remain present. The lungs show similar left upper lobe infiltrate with progression of right perihilar infiltrate. Pleural effusions are similar. There is no evidence of pneumothorax.    10/21/20 : CT Abdomen and Pelvis w/ Oral Cont and w/ IV Cont (10.21.20 @ 15:59) Mural thickening of the left colon and rectum. Liquid stool in the colon. Apparent segmental mural thickening of the mid to distal small bowel and the proximal duodenum. Findings may represent nonspecific enterocolitis, noninfectious inflammatory bowel disease, or ischemic bowel. Clinical correlation is recommended. The celiac axis artery, SMA, and KINA are patent without stenosis.    Dilatation of the mid small bowel is again noted. Oral contrast has reached the terminal ileum. Findings may represent small bowel obstruction, or ileus related to nonspecific enterocolitis.   Cholelithiasis. Moderate to large ascites in the abdomen, increased since the previous examination. 5 mm nonspecific noncalcified left upper lobe lung nodule; if the patient's is in the high risk category (i.e. smoker), follow-up chest CT may be pursued in 12 months to ensure stability. Combination of atelectasis and consolidation in the left lower lobe.  Possible 1.0 cm hypodense lesion in the left lobe of the thyroid. Thyroid ultrasound may be pursued for further evaluation.   Mild bilateral pleural effusions.  Aging determinate compression fracture at T5 vertebra.      MICROBIOLOGY DATA:    Urine Microscopic-Add On (NC) (11.20.20 @ 04:12)   Red Blood Cell - Urine: 5-10 /HPF   White Blood Cell - Urine: 11-25 /HPF   Calcium Oxalate Crystals: Few /HPF   Bacteria: Moderate /HPF   Comment - Urine: few amorphous urates   Epithelial Cells: Few /HPF     Culture - Blood (11.19.20 @ 10:19)   Specimen Source: .Blood Blood-Peripheral   Culture Results: No growth to date.     Culture - Surgical Swab (11.12.20 @ 05:01)   - Ampicillin: R >8 Predicts results to ampicillin/sulbactam, amoxacillin-clavulanate and piperacillin-tazobactam.   - Levofloxacin: R >4   - Linezolid: S 1   - Tetra/Doxy: R >8   - Vancomycin: R >16   Specimen Source: .Surgical Swab Sacral decub   Culture Results:   Few Enterococcus faecium (vancomycin resistant)   Rare Candida albicans "Susceptibilities not performed"   Organism Identification: Enterococcus faecium (vancomycin resistant)   Organism: Enterococcus faecium (vancomycin resistant)   Method Type: STEWART     Culture - Surgical Swab (11.12.20 @ 05:01)   Specimen Source: .Surgical Swab Sacral decub   Culture Results:  Few Enterococcus faecium Susceptibility to follow.   Rare Candida albicans "Susceptibilities not performed"     Culture - Sputum . (10.26.20 @ 00:26)   - Ampicillin/Sulbactam: R <=8/4   - Cefazolin: R >16   - Ceftazidime: I 16   - Clindamycin: R >4   - Erythromycin: R >4   - Gentamicin: S <=1 Should not be used as monotherapy   - Levofloxacin: S <=0.5   - Linezolid: S 2   - Oxacillin: R >2   - Penicillin: R >8   - RIF- Rifampin: S <=1 Should not be used as monotherapy   - Tetra/Doxy: S <=1   - Trimethoprim/Sulfamethoxazole: S <=0.5/9.5   - Trimethoprim/Sulfamethoxazole: S <=0.5/9.5   - Vancomycin: S 1     MRSA/MSSA PCR (10.21.20 @ 09:41)   MRSA PCR Result.: Detected:       Culture - Blood (10.20.20 @ 22:09)   Specimen Source: .Blood Blood   Culture Results:  No growth to date.     Culture - Blood (10.20.20 @ 22:09)   Specimen Source: .Blood Blood   Culture Results:   No growth to date.     Culture - Blood in AM (10.16.20 @ 10:13)   Specimen Source: .Blood Blood-Peripheral   Culture Results: No growth to date.     Culture - Blood in AM (10.16.20 @ 10:13)   Specimen Source: .Blood Blood-Peripheral   Culture Results: No growth to date.     Culture - Blood (10.13.20 @ 22:23)   Growth in anaerobic bottle: Gram Negative Rods   Specimen Source: .Blood Blood-Peripheral   Organism: Blood Culture PCR   Culture Results: Growth in anaerobic bottle: Bacteroides fragilis   "Susceptibilities not performed"     Culture - Blood (10.13.20 @ 22:23)   Specimen Source: .Blood Blood-Peripheral   Culture Results:   No growth to date.     Goals of Care Discussion with Family/Proxy/Other   - see note from/family meeting : 11/18     MARIBETH PADILLA    SCU progress note    INTERVAL HPI/OVERNIGHT EVENTS: Unable to go to IR today as IR would not proceed for procedure due to spike in temperature of 101F overnight. Patient remains on Cefepime, Daptomycin, Flucanazole. Chest xray ordered this morning. Repeat cultures ordered. Pending results. Seen and examined this morning, denies shortness of breath, reports feeling "fine" and "better".       DNR [ x]    DNR / trial intubation/ trial feeding tube / trial IV fluids / use abx.    Covid - 19 PCR:   COVID-19 PCR: NotDetec (17 Nov 2020 08:05)  SARS-CoV-2: NotDete (13 Oct 2020 16:00)    The 4Ms  What Matters Most: see GOC  Age appropriate Medications/Screen for High Risk Medication: Yes  Mentation: see CAM below  Mobility: defer to physical exam    The Confusion Assessment Method (CAM) Diagnostic Algorithm     1: Acute Onset or Fluctuating Course  - Is there evidence of an acute change in mental status from the patient’s baseline? Did the (abnormal) behavior  fluctuate during the day, that is, tend to come and go, or increase and decrease in severity?  [ ] YES [ x] NO     2: Inattention  - Did the patient have difficulty focusing attention, being easily distractible, or having difficulty keeping track of what was being said?  [ ] YES [ x] NO     3: Disorganized thinking  -Was the patient’s thinking disorganized or incoherent, such as rambling or irrelevant conversation, unclear or illogical flow of ideas, or unpredictable switching from subject to subject?  [ ] YES [ x] NO    4: Altered Level of consciousness?  [ ] YES [x ] NO    The diagnosis of delirium by CAM requires the presence of features 1 and 2 and either 3 or 4.    MEDICATIONS  (STANDING):  ascorbic acid 500 milliGRAM(s) Oral two times a day  cefepime   IVPB      cefepime   IVPB 1000 milliGRAM(s) IV Intermittent every 24 hours  DAPTOmycin IVPB 350 milliGRAM(s) IV Intermittent every 24 hours  enoxaparin Injectable 50 milliGRAM(s) SubCutaneous daily  fluconAZOLE IVPB 200 milliGRAM(s) IV Intermittent every 24 hours  fluconAZOLE IVPB      furosemide    Tablet 20 milliGRAM(s) Oral daily  multivitamin/minerals 1 Tablet(s) Oral daily  nystatin Powder 1 Application(s) Topical two times a day  pantoprazole   Suspension 40 milliGRAM(s) Enteral Tube daily  rifAXIMin 550 milliGRAM(s) Oral two times a day  zinc sulfate 220 milliGRAM(s) Oral daily    MEDICATIONS  (PRN):  acetaminophen   Tablet .. 650 milliGRAM(s) Oral every 6 hours PRN Temp greater or equal to 38C (100.4F)    PRESSORS: [ ] YES [x ] NO  MEDICATIONS  (STANDING):Drug Dosing Weight  Height (cm): 167.6 (21 Oct 2020 02:26)  Weight (kg): 56.2 (21 Oct 2020 02:26)  BMI (kg/m2): 20 (21 Oct 2020 02:26)  BSA (m2): 1.63 (21 Oct 2020 02:26)    CENTRAL LINE: [ ] YES [ x] NO  LOCATION:   DATE INSERTED:  REMOVE: [ ] YES [ ] NO  EXPLAIN:    TREJO: [ ] YES [x] NO    DATE INSERTED:  REMOVE:  [ ] YES [ ] NO  EXPLAIN:    PAST MEDICAL & SURGICAL HISTORY:  Anasarca    Pulmonary embolism    DVT (deep venous thrombosis)    SBO (small bowel obstruction)    H/O exploratory laparotomy    ICU Vital Signs Last 24 Hrs  T(C): 37.1 (27 Nov 2020 07:01), Max: 38.3 (27 Nov 2020 05:10)  T(F): 98.7 (27 Nov 2020 07:01), Max: 101 (27 Nov 2020 05:10)  HR: 124 (27 Nov 2020 05:10) (92 - 124)  BP: 126/79 (27 Nov 2020 05:10) (122/75 - 128/79)  BP(mean): --  ABP: --  ABP(mean): --  RR: 18 (27 Nov 2020 05:10) (16 - 18)  SpO2: 99% (27 Nov 2020 05:10) (99% - 100%)    PHYSICAL EXAM:    GENERAL: cachectic,  NAD  HEAD:  Atraumatic, Normocephalic  EYES: EOMI, PERRLA, conjunctiva and sclera clear  ENMT: No tonsillar erythema, exudates, or enlargement; Moist mucous membranes, Good dentition, No lesions  NECK: Supple, No JVD, Normal thyroid  NERVOUS SYSTEM:  Alert & Oriented X3,  Motor Strength 5/5 B/L upper, 4/5 BLE.   CHEST/LUNG: Unlabored. Clear to percussion bilaterally; No rales, rhonchi, wheezing, or rubs  HEART: Regular rate and rhythm; No murmurs, rubs, or gallops  ABDOMEN: Soft, Nontender, Nondistended; Bowel sounds present  EXTREMITIES:  2+ Peripheral Pulses, No clubbing, cyanosis, or edema  LYMPH: No lymphadenopathy noted  SKIN: Unstageable sacral DTI      LABS:  CBC Full  -  ( 27 Nov 2020 07:43 )  WBC Count : 7.76 K/uL  RBC Count : 3.12 M/uL  Hemoglobin : 9.0 g/dL  Hematocrit : 29.5 %  Platelet Count - Automated : 283 K/uL  Mean Cell Volume : 94.6 fl  Mean Cell Hemoglobin : 28.8 pg  Mean Cell Hemoglobin Concentration : 30.5 gm/dL  Auto Neutrophil # : x  Auto Lymphocyte # : x  Auto Monocyte # : x  Auto Eosinophil # : x  Auto Basophil # : x  Auto Neutrophil % : x  Auto Lymphocyte % : x  Auto Monocyte % : x  Auto Eosinophil % : x  Auto Basophil % : x    11-27    149<H>  |  115<H>  |  20<H>  ----------------------------<  94  4.0   |  28  |  1.02    Ca    8.5      27 Nov 2020 07:43  Phos  3.1     11-27  Mg     1.8     11-27    TPro  6.6  /  Alb  2.2<L>  /  TBili  2.6<H>  /  DBili  x   /  AST  68<H>  /  ALT  48  /  AlkPhos  235<H>  11-27    [ x ]  DVT Prophylaxis      RADIOLOGY & ADDITIONAL STUDIES: Reviewed    11/27/20: IMPRESSION:Increased interstitial lung markings with bilateral consolidations right greater than left. Small right pleural effusion. Overall worsening compared to prior exam.    11/24/20 : MR Pelvis Bony Only No Cont (11.24.20 @ 18:02) : Osteomyelitis of the lower sacrum and coccyx with overlying cutaneous ulceration,    11/19/20 : Xray Chest 1 View- PORTABLE-Urgent (Xray Chest 1 View- PORTABLE-Urgent .) (11.19.20 @ 23:53): Improvement in bilateral airspace disease with persistent bibasilar consolidation and small effusions.    11/4/20 : Xray Chest 1 View- PORTABLE-Routine (Xray Chest 1 View- PORTABLE-Routine in AM.) (11.04.20 @ 10:59) Reexpansion of the left lung with residual left pleural effusion and/or infiltrate.  Right pulmonary edema unchanged.  Tubes and catheters in satisfactory position.      10/25/20: Xray Chest 1 View- PORTABLE-Routine (Xray Chest 1 View- PORTABLE-Routine in AM.) (10.25.20 @ 09:21) Frontal expiratory view of the chest shows the heart to be similar in size. Endotracheal tube, right jugular line and feeding tube remain present. The lungs show similar left upper lobe infiltrate with progression of right perihilar infiltrate. Pleural effusions are similar. There is no evidence of pneumothorax.    10/21/20 : CT Abdomen and Pelvis w/ Oral Cont and w/ IV Cont (10.21.20 @ 15:59) Mural thickening of the left colon and rectum. Liquid stool in the colon. Apparent segmental mural thickening of the mid to distal small bowel and the proximal duodenum. Findings may represent nonspecific enterocolitis, noninfectious inflammatory bowel disease, or ischemic bowel. Clinical correlation is recommended. The celiac axis artery, SMA, and KINA are patent without stenosis.    Dilatation of the mid small bowel is again noted. Oral contrast has reached the terminal ileum. Findings may represent small bowel obstruction, or ileus related to nonspecific enterocolitis.   Cholelithiasis. Moderate to large ascites in the abdomen, increased since the previous examination. 5 mm nonspecific noncalcified left upper lobe lung nodule; if the patient's is in the high risk category (i.e. smoker), follow-up chest CT may be pursued in 12 months to ensure stability. Combination of atelectasis and consolidation in the left lower lobe.  Possible 1.0 cm hypodense lesion in the left lobe of the thyroid. Thyroid ultrasound may be pursued for further evaluation.   Mild bilateral pleural effusions.  Aging determinate compression fracture at T5 vertebra.      MICROBIOLOGY DATA:    Urine Microscopic-Add On (NC) (11.20.20 @ 04:12)   Red Blood Cell - Urine: 5-10 /HPF   White Blood Cell - Urine: 11-25 /HPF   Calcium Oxalate Crystals: Few /HPF   Bacteria: Moderate /HPF   Comment - Urine: few amorphous urates   Epithelial Cells: Few /HPF     Culture - Blood (11.19.20 @ 10:19)   Specimen Source: .Blood Blood-Peripheral   Culture Results: No growth to date.     Culture - Surgical Swab (11.12.20 @ 05:01)   - Ampicillin: R >8 Predicts results to ampicillin/sulbactam, amoxacillin-clavulanate and piperacillin-tazobactam.   - Levofloxacin: R >4   - Linezolid: S 1   - Tetra/Doxy: R >8   - Vancomycin: R >16   Specimen Source: .Surgical Swab Sacral decub   Culture Results:   Few Enterococcus faecium (vancomycin resistant)   Rare Candida albicans "Susceptibilities not performed"   Organism Identification: Enterococcus faecium (vancomycin resistant)   Organism: Enterococcus faecium (vancomycin resistant)   Method Type: STEWART     Culture - Surgical Swab (11.12.20 @ 05:01)   Specimen Source: .Surgical Swab Sacral decub   Culture Results:  Few Enterococcus faecium Susceptibility to follow.   Rare Candida albicans "Susceptibilities not performed"     Culture - Sputum . (10.26.20 @ 00:26)   - Ampicillin/Sulbactam: R <=8/4   - Cefazolin: R >16   - Ceftazidime: I 16   - Clindamycin: R >4   - Erythromycin: R >4   - Gentamicin: S <=1 Should not be used as monotherapy   - Levofloxacin: S <=0.5   - Linezolid: S 2   - Oxacillin: R >2   - Penicillin: R >8   - RIF- Rifampin: S <=1 Should not be used as monotherapy   - Tetra/Doxy: S <=1   - Trimethoprim/Sulfamethoxazole: S <=0.5/9.5   - Trimethoprim/Sulfamethoxazole: S <=0.5/9.5   - Vancomycin: S 1     MRSA/MSSA PCR (10.21.20 @ 09:41)   MRSA PCR Result.: Detected:       Culture - Blood (10.20.20 @ 22:09)   Specimen Source: .Blood Blood   Culture Results:  No growth to date.     Culture - Blood (10.20.20 @ 22:09)   Specimen Source: .Blood Blood   Culture Results:   No growth to date.     Culture - Blood in AM (10.16.20 @ 10:13)   Specimen Source: .Blood Blood-Peripheral   Culture Results: No growth to date.     Culture - Blood in AM (10.16.20 @ 10:13)   Specimen Source: .Blood Blood-Peripheral   Culture Results: No growth to date.     Culture - Blood (10.13.20 @ 22:23)   Growth in anaerobic bottle: Gram Negative Rods   Specimen Source: .Blood Blood-Peripheral   Organism: Blood Culture PCR   Culture Results: Growth in anaerobic bottle: Bacteroides fragilis   "Susceptibilities not performed"     Culture - Blood (10.13.20 @ 22:23)   Specimen Source: .Blood Blood-Peripheral   Culture Results:   No growth to date.     Goals of Care Discussion with Family/Proxy/Other   - see note from/family meeting : 11/18     MARIBETH PADILLA    SCU progress note    INTERVAL HPI/OVERNIGHT EVENTS: Unable to go to IR today as IR would not proceed for procedure due to spike in temperature of 101F overnight. Patient remains on Cefepime, Daptomycin, Flucanazole. Chest xray ordered this morning. Repeat cultures ordered. Pending results. Seen and examined this morning, denies shortness of breath, reports feeling "fine" and "better".     6pm: Discussed case with Dr. Patricio- Discontinued Cefepime; Started on Meropenem; Follow up Procal in AM; Call surgery in AM to see if further debridement of sacral wound can be done to clear up more infection. Fever can be likely from wound as Lactate is normal and blood cultures negative. Follow up cultures from 11/27. Continue to monitor for fevers.     DNR [ x]    DNR / trial intubation/ trial feeding tube / trial IV fluids / use abx.    Covid - 19 PCR:   COVID-19 PCR: NotDetec (17 Nov 2020 08:05)  SARS-CoV-2: NotDete (13 Oct 2020 16:00)    The 4Ms  What Matters Most: see GOC  Age appropriate Medications/Screen for High Risk Medication: Yes  Mentation: see CAM below  Mobility: defer to physical exam    The Confusion Assessment Method (CAM) Diagnostic Algorithm     1: Acute Onset or Fluctuating Course  - Is there evidence of an acute change in mental status from the patient’s baseline? Did the (abnormal) behavior  fluctuate during the day, that is, tend to come and go, or increase and decrease in severity?  [ ] YES [ x] NO     2: Inattention  - Did the patient have difficulty focusing attention, being easily distractible, or having difficulty keeping track of what was being said?  [ ] YES [ x] NO     3: Disorganized thinking  -Was the patient’s thinking disorganized or incoherent, such as rambling or irrelevant conversation, unclear or illogical flow of ideas, or unpredictable switching from subject to subject?  [ ] YES [ x] NO    4: Altered Level of consciousness?  [ ] YES [x ] NO    The diagnosis of delirium by CAM requires the presence of features 1 and 2 and either 3 or 4.    MEDICATIONS  (STANDING):  ascorbic acid 500 milliGRAM(s) Oral two times a day  cefepime   IVPB      cefepime   IVPB 1000 milliGRAM(s) IV Intermittent every 24 hours  DAPTOmycin IVPB 350 milliGRAM(s) IV Intermittent every 24 hours  enoxaparin Injectable 50 milliGRAM(s) SubCutaneous daily  fluconAZOLE IVPB 200 milliGRAM(s) IV Intermittent every 24 hours  fluconAZOLE IVPB      furosemide    Tablet 20 milliGRAM(s) Oral daily  multivitamin/minerals 1 Tablet(s) Oral daily  nystatin Powder 1 Application(s) Topical two times a day  pantoprazole   Suspension 40 milliGRAM(s) Enteral Tube daily  rifAXIMin 550 milliGRAM(s) Oral two times a day  zinc sulfate 220 milliGRAM(s) Oral daily    MEDICATIONS  (PRN):  acetaminophen   Tablet .. 650 milliGRAM(s) Oral every 6 hours PRN Temp greater or equal to 38C (100.4F)    PRESSORS: [ ] YES [x ] NO  MEDICATIONS  (STANDING):Drug Dosing Weight  Height (cm): 167.6 (21 Oct 2020 02:26)  Weight (kg): 56.2 (21 Oct 2020 02:26)  BMI (kg/m2): 20 (21 Oct 2020 02:26)  BSA (m2): 1.63 (21 Oct 2020 02:26)    CENTRAL LINE: [ ] YES [ x] NO  LOCATION:   DATE INSERTED:  REMOVE: [ ] YES [ ] NO  EXPLAIN:    TREJO: [ ] YES [x] NO    DATE INSERTED:  REMOVE:  [ ] YES [ ] NO  EXPLAIN:    PAST MEDICAL & SURGICAL HISTORY:  Anasarca    Pulmonary embolism    DVT (deep venous thrombosis)    SBO (small bowel obstruction)    H/O exploratory laparotomy    ICU Vital Signs Last 24 Hrs  T(C): 37.1 (27 Nov 2020 07:01), Max: 38.3 (27 Nov 2020 05:10)  T(F): 98.7 (27 Nov 2020 07:01), Max: 101 (27 Nov 2020 05:10)  HR: 124 (27 Nov 2020 05:10) (92 - 124)  BP: 126/79 (27 Nov 2020 05:10) (122/75 - 128/79)  BP(mean): --  ABP: --  ABP(mean): --  RR: 18 (27 Nov 2020 05:10) (16 - 18)  SpO2: 99% (27 Nov 2020 05:10) (99% - 100%)    PHYSICAL EXAM:    GENERAL: cachectic,  NAD  HEAD:  Atraumatic, Normocephalic  EYES: EOMI, PERRLA, conjunctiva and sclera clear  ENMT: No tonsillar erythema, exudates, or enlargement; Moist mucous membranes, Good dentition, No lesions  NECK: Supple, No JVD, Normal thyroid  NERVOUS SYSTEM:  Alert & Oriented X3,  Motor Strength 5/5 B/L upper, 4/5 BLE.   CHEST/LUNG: Unlabored. Clear to percussion bilaterally; No rales, rhonchi, wheezing, or rubs  HEART: Regular rate and rhythm; No murmurs, rubs, or gallops  ABDOMEN: Soft, Nontender, Nondistended; Bowel sounds present  EXTREMITIES:  2+ Peripheral Pulses, No clubbing, cyanosis, or edema  LYMPH: No lymphadenopathy noted  SKIN: Unstageable sacral DTI      LABS:  CBC Full  -  ( 27 Nov 2020 07:43 )  WBC Count : 7.76 K/uL  RBC Count : 3.12 M/uL  Hemoglobin : 9.0 g/dL  Hematocrit : 29.5 %  Platelet Count - Automated : 283 K/uL  Mean Cell Volume : 94.6 fl  Mean Cell Hemoglobin : 28.8 pg  Mean Cell Hemoglobin Concentration : 30.5 gm/dL  Auto Neutrophil # : x  Auto Lymphocyte # : x  Auto Monocyte # : x  Auto Eosinophil # : x  Auto Basophil # : x  Auto Neutrophil % : x  Auto Lymphocyte % : x  Auto Monocyte % : x  Auto Eosinophil % : x  Auto Basophil % : x    11-27    149<H>  |  115<H>  |  20<H>  ----------------------------<  94  4.0   |  28  |  1.02    Ca    8.5      27 Nov 2020 07:43  Phos  3.1     11-27  Mg     1.8     11-27    TPro  6.6  /  Alb  2.2<L>  /  TBili  2.6<H>  /  DBili  x   /  AST  68<H>  /  ALT  48  /  AlkPhos  235<H>  11-27    [ x ]  DVT Prophylaxis      RADIOLOGY & ADDITIONAL STUDIES: Reviewed    11/27/20: IMPRESSION:Increased interstitial lung markings with bilateral consolidations right greater than left. Small right pleural effusion. Overall worsening compared to prior exam.    11/24/20 : MR Pelvis Bony Only No Cont (11.24.20 @ 18:02) : Osteomyelitis of the lower sacrum and coccyx with overlying cutaneous ulceration,    11/19/20 : Xray Chest 1 View- PORTABLE-Urgent (Xray Chest 1 View- PORTABLE-Urgent .) (11.19.20 @ 23:53): Improvement in bilateral airspace disease with persistent bibasilar consolidation and small effusions.    11/4/20 : Xray Chest 1 View- PORTABLE-Routine (Xray Chest 1 View- PORTABLE-Routine in AM.) (11.04.20 @ 10:59) Reexpansion of the left lung with residual left pleural effusion and/or infiltrate.  Right pulmonary edema unchanged.  Tubes and catheters in satisfactory position.      10/25/20: Xray Chest 1 View- PORTABLE-Routine (Xray Chest 1 View- PORTABLE-Routine in AM.) (10.25.20 @ 09:21) Frontal expiratory view of the chest shows the heart to be similar in size. Endotracheal tube, right jugular line and feeding tube remain present. The lungs show similar left upper lobe infiltrate with progression of right perihilar infiltrate. Pleural effusions are similar. There is no evidence of pneumothorax.    10/21/20 : CT Abdomen and Pelvis w/ Oral Cont and w/ IV Cont (10.21.20 @ 15:59) Mural thickening of the left colon and rectum. Liquid stool in the colon. Apparent segmental mural thickening of the mid to distal small bowel and the proximal duodenum. Findings may represent nonspecific enterocolitis, noninfectious inflammatory bowel disease, or ischemic bowel. Clinical correlation is recommended. The celiac axis artery, SMA, and KINA are patent without stenosis.    Dilatation of the mid small bowel is again noted. Oral contrast has reached the terminal ileum. Findings may represent small bowel obstruction, or ileus related to nonspecific enterocolitis.   Cholelithiasis. Moderate to large ascites in the abdomen, increased since the previous examination. 5 mm nonspecific noncalcified left upper lobe lung nodule; if the patient's is in the high risk category (i.e. smoker), follow-up chest CT may be pursued in 12 months to ensure stability. Combination of atelectasis and consolidation in the left lower lobe.  Possible 1.0 cm hypodense lesion in the left lobe of the thyroid. Thyroid ultrasound may be pursued for further evaluation.   Mild bilateral pleural effusions.  Aging determinate compression fracture at T5 vertebra.      MICROBIOLOGY DATA:    Urine Microscopic-Add On (NC) (11.20.20 @ 04:12)   Red Blood Cell - Urine: 5-10 /HPF   White Blood Cell - Urine: 11-25 /HPF   Calcium Oxalate Crystals: Few /HPF   Bacteria: Moderate /HPF   Comment - Urine: few amorphous urates   Epithelial Cells: Few /HPF     Culture - Blood (11.19.20 @ 10:19)   Specimen Source: .Blood Blood-Peripheral   Culture Results: No growth to date.     Culture - Surgical Swab (11.12.20 @ 05:01)   - Ampicillin: R >8 Predicts results to ampicillin/sulbactam, amoxacillin-clavulanate and piperacillin-tazobactam.   - Levofloxacin: R >4   - Linezolid: S 1   - Tetra/Doxy: R >8   - Vancomycin: R >16   Specimen Source: .Surgical Swab Sacral decub   Culture Results:   Few Enterococcus faecium (vancomycin resistant)   Rare Candida albicans "Susceptibilities not performed"   Organism Identification: Enterococcus faecium (vancomycin resistant)   Organism: Enterococcus faecium (vancomycin resistant)   Method Type: STEWART     Culture - Surgical Swab (11.12.20 @ 05:01)   Specimen Source: .Surgical Swab Sacral decub   Culture Results:  Few Enterococcus faecium Susceptibility to follow.   Rare Candida albicans "Susceptibilities not performed"     Culture - Sputum . (10.26.20 @ 00:26)   - Ampicillin/Sulbactam: R <=8/4   - Cefazolin: R >16   - Ceftazidime: I 16   - Clindamycin: R >4   - Erythromycin: R >4   - Gentamicin: S <=1 Should not be used as monotherapy   - Levofloxacin: S <=0.5   - Linezolid: S 2   - Oxacillin: R >2   - Penicillin: R >8   - RIF- Rifampin: S <=1 Should not be used as monotherapy   - Tetra/Doxy: S <=1   - Trimethoprim/Sulfamethoxazole: S <=0.5/9.5   - Trimethoprim/Sulfamethoxazole: S <=0.5/9.5   - Vancomycin: S 1     MRSA/MSSA PCR (10.21.20 @ 09:41)   MRSA PCR Result.: Detected:       Culture - Blood (10.20.20 @ 22:09)   Specimen Source: .Blood Blood   Culture Results:  No growth to date.     Culture - Blood (10.20.20 @ 22:09)   Specimen Source: .Blood Blood   Culture Results:   No growth to date.     Culture - Blood in AM (10.16.20 @ 10:13)   Specimen Source: .Blood Blood-Peripheral   Culture Results: No growth to date.     Culture - Blood in AM (10.16.20 @ 10:13)   Specimen Source: .Blood Blood-Peripheral   Culture Results: No growth to date.     Culture - Blood (10.13.20 @ 22:23)   Growth in anaerobic bottle: Gram Negative Rods   Specimen Source: .Blood Blood-Peripheral   Organism: Blood Culture PCR   Culture Results: Growth in anaerobic bottle: Bacteroides fragilis   "Susceptibilities not performed"     Culture - Blood (10.13.20 @ 22:23)   Specimen Source: .Blood Blood-Peripheral   Culture Results:   No growth to date.     Goals of Care Discussion with Family/Proxy/Other   - see note from/family meeting : 11/18

## 2020-11-27 NOTE — PROGRESS NOTE ADULT - ASSESSMENT
Patient is a 65y old  Female from home, lives with daughter, ambulates with walker with PMH of recurrent SBO's, s/p exp lap, SB resection in 2015, ex lap, ALBINA in 2018, DVT, PE, on Xarelto, IVC filter, chronic leg swelling, and anasarca, Now send in to the ER by her PCP, Dr. Ross, for evaluation of  generalized weakness and hypotension BP of 80/40 during office visit. On admission, she found to have no fever and BP was in lower normal range and no Leukocytosis. The CXR is clear and CT abd/pelvis consistent with Ileus. The ID consult requested to assist with evaluation of infectious etiology of episode of hypotension. Found to have Bacteroides bacteremia and now developed septic shock on Cefepime and Flagyl. Hence transferred to ICU. 10/20/20.    # Hypotension  at office- BP of 80/40 - resolved   #  Bacteroides fragilis Bacteremia - 10/13/20 - ? source most likely GI- Repeat Blood Cxs have NGTD 10/16/20  # Ileus  - h/o recurrent SBO and s/p EXp. LAp  # COVID 19 negative   # Septic shock ( Hypothermia + hypotensive)- transferred to ICU since requiring pressor- source GI, The CT abd/pelvis shows nonspecific enterocolitis, noninfectious inflammatory bowel disease, or ischemic bowel, along with ileus.   # Pneumonia- HCAP vs Aspiration - on CXR 10/24 and CT chest 10/21- sputum Cx grew Methicillin resistant Staphylococcus aureus  and Stenotrophomonas maltophilia.  # S/p extubated 11/9/20  # Infected sacral ulcer- s/p debridement and culture grew Enterococcus, VRE and Candida, sensitivity is pending- The MRI of pelvis shows  Osteomyelitis of the lower sacrum and coccyx with overlying cutaneous ulceration.   # Fever- 11/8 and 11/19- BCX NGTD 11/19 - The CXR shows Improvement in bilateral airspace disease with persistent bibasilar consolidation. She is s/p removal of velazquez catheter and passed TOV.      would recommend:    1. Surgical follow up for further debridement of sacral wound since still spiking fever  2. Monitor Temp. and c/w supportive care  3. Continue Daptomycin to cover VRE  and  Fluconazole to cover Candida in sacral wound culture X 6  weeks to treat Osteomyelitis, might change to Linezolid for last two weeks if she is OFf midodrine   4. Monitor kidney function, is improving  5. Aspiration precaution  6. Continue Sacral Wound care  and Frequent Repositioning     d/w Dr. Burch and Joshua, Pharm- D    Attending Attestation:    Spent more than 45 minutes on total encounter, more than 50 % of the visit was spent counseling and/or coordinating care by the Attending physician.   Patient is a 65y old  Female from home, lives with daughter, ambulates with walker with PMH of recurrent SBO's, s/p exp lap, SB resection in 2015, ex lap, ALBINA in 2018, DVT, PE, on Xarelto, IVC filter, chronic leg swelling, and anasarca, Now send in to the ER by her PCP, Dr. Ross, for evaluation of  generalized weakness and hypotension BP of 80/40 during office visit. On admission, she found to have no fever and BP was in lower normal range and no Leukocytosis. The CXR is clear and CT abd/pelvis consistent with Ileus. The ID consult requested to assist with evaluation of infectious etiology of episode of hypotension. Found to have Bacteroides bacteremia and now developed septic shock on Cefepime and Flagyl. Hence transferred to ICU. 10/20/20.    # Hypotension  at office- BP of 80/40 - resolved   #  Bacteroides fragilis Bacteremia - 10/13/20 - ? source most likely GI- Repeat Blood Cxs have NGTD 10/16/20  # Ileus  - h/o recurrent SBO and s/p EXp. LAp  # COVID 19 negative   # Septic shock ( Hypothermia + hypotensive)- transferred to ICU since requiring pressor- source GI, The CT abd/pelvis shows nonspecific enterocolitis, noninfectious inflammatory bowel disease, or ischemic bowel, along with ileus.   # Pneumonia- HCAP vs Aspiration - on CXR 10/24 and CT chest 10/21- sputum Cx grew Methicillin resistant Staphylococcus aureus  and Stenotrophomonas maltophilia.  # S/p extubated 11/9/20  # Infected sacral ulcer- s/p debridement and culture grew Enterococcus, VRE and Candida, sensitivity is pending- The MRI of pelvis shows  Osteomyelitis of the lower sacrum and coccyx with overlying cutaneous ulceration.   # Fever- 11/8 and 11/19- BCX NGTD 11/19 - The CXR shows Improvement in bilateral airspace disease with persistent bibasilar consolidation. She is s/p removal of velazquez catheter and passed TOV.  # HCAP- 11/27/20,  Intermittent fever and CXR shows  bilateral consolidations right greater than left.      would recommend:    1. Please obtain MRSA PCR and if positive then will change Daptomycin to Linezolid since Daptomycin does not cover pneumonia, get destroys in Lung with surfactant  2. Please change Cefepime to Meropenem to cover resistant organisms since still spiking fever  3. Surgical follow up for further debridement of sacral wound since still spiking fever  4. Monitor Temp. and c/w supportive care  5. Continue Daptomycin to cover VRE  and  Fluconazole to cover Candida in sacral wound culture X 6  weeks to treat Osteomyelitis, might change to Linezolid for last two weeks if she is OFf midodrine   6. Aspiration precaution  7.  frequent repositioning     d/w Covering Sushma POOL    Attending Attestation:    Spent more than 45 minutes on total encounter, more than 50 % of the visit was spent counseling and/or coordinating care by the Attending physician.

## 2020-11-27 NOTE — PROGRESS NOTE ADULT - PROBLEM SELECTOR PLAN 9
DVT and GI prophylaxis  Continue rifaximin  Dapto will be extended to total of 6 weeks.   PICC in IR likely on Friday. Hold Lovenox on Friday.   Discharge planning likely to [facility] when medically optimized - CXR done this morning 11/27 (ordered for possible fever etiology this morning) showed bilateral consolidation and pleural effusion, as listed above  - IV Lasix 40mg given this afternoon X1;   - IV Lasix 40mg ordered for 11/28 at 2pm x1  - Continue PO Lasix daily 20mg daily;  -Monitor BP; Adjust lasix as needed to maintain BP within parameters  -Monitor urine output;   -Reassess CXR tomorrow afternoon;   - Monitor CMP in AM  -Dr. Sree Villarreal in agreement with plan

## 2020-11-27 NOTE — PROGRESS NOTE ADULT - SUBJECTIVE AND OBJECTIVE BOX
Patient was seen and examined  Patient is a 65y old  Female who presents with a chief complaint of Hypotension (27 Nov 2020 09:14)      INTERVAL HPI/OVERNIGHT EVENTS:  T(C): 37.1 (11-27-20 @ 07:01), Max: 38.3 (11-27-20 @ 05:10)  HR: 108 (11-27-20 @ 10:06) (92 - 124)  BP: 129/86 (11-27-20 @ 10:06) (122/75 - 129/86)  RR: 18 (11-27-20 @ 05:10) (16 - 18)  SpO2: 100% (11-27-20 @ 10:06) (99% - 100%)  Wt(kg): --  I&O's Summary      LABS:                        9.0    7.76  )-----------( 283      ( 27 Nov 2020 07:43 )             29.5     11-27    149<H>  |  115<H>  |  20<H>  ----------------------------<  94  4.0   |  28  |  1.02    Ca    8.5      27 Nov 2020 07:43  Phos  3.1     11-27  Mg     1.8     11-27    TPro  6.6  /  Alb  2.2<L>  /  TBili  2.6<H>  /  DBili  x   /  AST  68<H>  /  ALT  48  /  AlkPhos  235<H>  11-27    PT/INR - ( 27 Nov 2020 10:02 )   PT: 13.3 sec;   INR: 1.12 ratio         PTT - ( 27 Nov 2020 10:02 )  PTT:32.1 sec    CAPILLARY BLOOD GLUCOSE      POCT Blood Glucose.: 95 mg/dL (27 Nov 2020 05:40)  POCT Blood Glucose.: 137 mg/dL (26 Nov 2020 23:43)              MEDICATIONS  (STANDING):  ascorbic acid 500 milliGRAM(s) Oral two times a day  cefepime   IVPB      cefepime   IVPB 1000 milliGRAM(s) IV Intermittent every 24 hours  DAPTOmycin IVPB 350 milliGRAM(s) IV Intermittent every 24 hours  enoxaparin Injectable 50 milliGRAM(s) SubCutaneous daily  fluconAZOLE IVPB 200 milliGRAM(s) IV Intermittent every 24 hours  fluconAZOLE IVPB      furosemide    Tablet 20 milliGRAM(s) Oral daily  multivitamin/minerals 1 Tablet(s) Oral daily  nystatin Powder 1 Application(s) Topical two times a day  pantoprazole   Suspension 40 milliGRAM(s) Enteral Tube daily  rifAXIMin 550 milliGRAM(s) Oral two times a day  zinc sulfate 220 milliGRAM(s) Oral daily    MEDICATIONS  (PRN):  acetaminophen   Tablet .. 650 milliGRAM(s) Oral every 6 hours PRN Temp greater or equal to 38C (100.4F)      RADIOLOGY & ADDITIONAL TESTS:    Imaging Personally Reviewed:  [ x ] YES  [ ] NO    REVIEW OF SYSTEMS:  CONSTITUTIONAL: No fever, weight loss, or fatigue  EYES: No eye pain, visual disturbances, or discharge  ENMT:  No difficulty hearing, tinnitus, vertigo; No sinus or throat pain  NECK: No pain or stiffness  BREASTS: No pain, masses, or nipple discharge  RESPIRATORY: No cough, wheezing, chills or hemoptysis; No shortness of breath  CARDIOVASCULAR: No chest pain, palpitations, dizziness, or leg swelling  GASTROINTESTINAL: No abdominal or epigastric pain. No nausea, vomiting, or hematemesis; No diarrhea or constipation. No melena or hematochezia.  GENITOURINARY: No dysuria, frequency, hematuria, or incontinence  NEUROLOGICAL: No headaches, memory loss, loss of strength, numbness, or tremors  SKIN: No itching, burning, rashes, or lesions   LYMPH NODES: No enlarged glands  ENDOCRINE: No heat or cold intolerance; No hair loss  MUSCULOSKELETAL: No joint pain or swelling; No muscle, back, or extremity pain  PSYCHIATRIC: No depression, anxiety, mood swings, or difficulty sleeping  HEME/LYMPH: No easy bruising, or bleeding gums  ALLERY AND IMMUNOLOGIC: No hives or eczema      Consultant(s) Notes Reviewed:  [ x ] YES  [ ] NO    PHYSICAL EXAM:  GENERAL: NAD, well-groomed, well-developed  HEAD:  Atraumatic, Normocephalic  EYES: EOMI, PERRLA, conjunctiva and sclera clear  ENMT: No tonsillar erythema, exudates, or enlargement; Moist mucous membranes, Good dentition, No lesions  NECK: Supple, No JVD, Normal thyroid  NERVOUS SYSTEM:  Alert & Oriented X3, Good concentration; Motor Strength 5/5 B/L upper and lower extremities; DTRs 2+ intact and symmetric  CHEST/LUNG: Clear to percussion bilaterally; No rales, rhonchi, wheezing, or rubs  HEART: Regular rate and rhythm; No murmurs, rubs, or gallops  ABDOMEN: Soft, Nontender, Nondistended; Bowel sounds present  EXTREMITIES:  2+ Peripheral Pulses, No clubbing, cyanosis, or edema  LYMPH: No lymphadenopathy noted  SKIN: No rashes or lesions    Care Discussed with Consultants/Other Providers [ x] YES  [ ] NO

## 2020-11-27 NOTE — CHART NOTE - NSCHARTNOTEFT_GEN_A_CORE
EVENT:      64 y/o female from with PMH of recurrent SBO's, s/p exp. lap, SB resection in 2015, ex lap, ALBINA in 2018, DVT, PE, on Xarelto, IVC filter, chronic leg swelling, and anasarca, came in to the ED after being sent by PCP Dr. Ross for generalized weakness and hypotension during office visit. Patient was seen by PCP for weakness and chills earlier today and was sent to the ED after she was noted to be hypotensive with BP 80/40. Patient denies fever, SOB, palpitations, changes to her bowel or urinary habits, abdominal pain, urinary symptoms or any other acute complaints.  11/27/20, 6am, patient' s T - 101F, HR - 124, BP - 126/79, RR- 18, O2sat - 99%. Tylenol 650 mg po - given.     OBJECTIVE:  Vital Signs Last 24 Hrs  T(C): 38.3 (27 Nov 2020 05:10), Max: 38.3 (27 Nov 2020 05:10)  T(F): 101 (27 Nov 2020 05:10), Max: 101 (27 Nov 2020 05:10)  HR: 124 (27 Nov 2020 05:10) (92 - 124)  BP: 126/79 (27 Nov 2020 05:10) (122/75 - 128/79)  BP(mean): --  RR: 18 (27 Nov 2020 05:10) (16 - 18)  SpO2: 99% (27 Nov 2020 05:10) (99% - 100%)    FOCUSED PHYSICAL EXAM:    LABS:                        8.7    7.35  )-----------( 236      ( 26 Nov 2020 06:53 )             28.6   CARDIAC MARKERS ( 25 Nov 2020 07:26 )  x     / x     / 102 U/L / x     / x        11-26    147<H>  |  114<H>  |  20<H>  ----------------------------<  76  3.2<L>   |  28  |  1.04    Ca    8.5      26 Nov 2020 06:53  Phos  3.6     11-26  Mg     2.0     11-26    TPro  6.3  /  Alb  2.1<L>  /  TBili  2.6<H>  /  DBili  x   /  AST  58<H>  /  ALT  40  /  AlkPhos  191<H>  11-26    PLAN: 1. Blood culture x 2 sets, b. lactate, urinalysis, and chest x ray - urgent - pending.     FOLLOW UP / RESULT: Reassess patient VS. Monitor morning labs.

## 2020-11-27 NOTE — PROGRESS NOTE ADULT - ASSESSMENT
65y Female from home, lives with daughter, ambulates with walker with PMH of recurrent SBO's, s/p exp lap, SB resection in 2015, ex lap, ALBINA in 2018, DVT, PE, on Xarelto, IVC filter, chronic leg swelling, and anasarca, came in to the ED due to hypotension during office visit. Patient was found to be bacteremic with bacteroids fragilis. Admitted in ICU due to hypotension and hypothermia. patient completed course of antibiotics . BCx X2 negative. Ammonia level elevated and patient refused NGT placement, mental status deteriorated and patient desaturated to high 70%, patient got intubated on 10/24 . Sputum was positive for MRSA and Stenotrophomonas. patient started on vancomycin and Levaquin. patient was given lactulose for high ammonia level. kidney function and liver function started to deteriorate and patient was leaning toward multi organ failure . high dose of Lasix started and patient was aggressively diuresed. Kidney function improved . Ammonia level dropped to less than 10.  Mental status improved and patient extubated on 11/3/2020. Patient was seen by wound specialist and decubitus ulcer debrided. patient had a drop in h/h s/p debridement . received 1 unit PRBC .     11/12  Transferred from ICU to SCU  `11/17  Midodrine d/c secondary to daptomycin being started.  11/18 Febrile, Tmax 101.5. Repeat BC sent.   11/19 Discontinue velazquez.   11/20 febrile overnight.   11/22 s/p 2 units PRBC for Hgb 6.4  11/24 MRI pelvis (+) OM  11/25 PAVAN no endocarditis. Awaiting PICC  11/27 Spiked fever of 101F; Repeated cultures, chest xray, lactate, Follow up results. IR Denies PICC Placement due to fever. Plan for IR Monday (NPO Sunday) if afebrile over the weekend.

## 2020-11-28 NOTE — PROGRESS NOTE ADULT - PROBLEM SELECTOR PLAN 5
Stage IV/unstageable  S/P sacral wound debridement  No active infection per Surgery `  MRI (+) OM  Continue fluconazole as per ID.   Dapto for total of 6 weeks with end date 12/28/2020 (11/17 - 12/28/2020)  PICC placement in IR , likely Friday. Hold Lovenox on Friday.   Monitor CK while on Dapto   Continue wound care as ordered.  Turn every 2 hours.  OOB daily.

## 2020-11-28 NOTE — PROGRESS NOTE ADULT - ASSESSMENT
65y Female from home, lives with daughter, ambulates with walker with PMH of recurrent SBO's, s/p exp lap, SB resection in 2015, ex lap, ALBINA in 2018, DVT, PE, on Xarelto, IVC filter, chronic leg swelling, and anasarca, came in to the ED due to hypotension during office visit. Patient was found to be bacteremic with bacteroids fragilis. Admitted in ICU due to hypotension and hypothermia. patient completed course of antibiotics . BCx X2 negative. Ammonia level elevated and patient refused NGT placement, mental status deteriorated and patient desaturated to high 70%, patient got intubated on 10/24 . Sputum was positive for MRSA and Stenotrophomonas. patient started on vancomycin and Levaquin. patient was given lactulose for high ammonia level. kidney function and liver function started to deteriorate and patient was leaning toward multi organ failure . high dose of Lasix started and patient was aggressively diuresed. Kidney function improved . Ammonia level dropped to less than 10.  Mental status improved and patient extubated on 11/3/2020. Patient was seen by wound specialist and decubitus ulcer debrided. patient had a drop in h/h s/p debridement . received 1 unit PRBC .     11/12  Transferred from ICU to SCU  `11/17  Midodrine d/c secondary to daptomycin being started.  11/18 Febrile, Tmax 101.5. Repeat BC sent.   11/19 Discontinue velazquez.   11/20 febrile overnight.   11/22 s/p 2 units PRBC for Hgb 6.4  11/24 MRI pelvis (+) OM  11/25 PAVAN no endocarditis. Awaiting PICC  11/27 Spiked fever of 101F; Repeated cultures, chest xray, lactate, Follow up results. IR Denies PICC Placement due to fever. Plan for IR Monday (NPO Sunday) if afebrile over the weekend. Given one dose of lasix IV today for pleural effusion on chest xray; Give one more dose of IV Lasix 40mg tomorrow at 2pm if BP stable. Continue with daily lasix.

## 2020-11-28 NOTE — PROGRESS NOTE ADULT - SUBJECTIVE AND OBJECTIVE BOX
Patient is seen and examined at the bed side, is afebrile. The WBC  count stay normal. The CXR shows Increased interstitial lung markings with bilateral consolidations right greater than left.        REVIEW OF SYSTEMS: All other review systems are negative      ALLERGIES: No Known Allergies      Vital Signs Last 24 Hrs  T(C): 36.9 (28 Nov 2020 14:09), Max: 37.1 (28 Nov 2020 05:10)  T(F): 98.5 (28 Nov 2020 14:09), Max: 98.7 (28 Nov 2020 05:10)  HR: 99 (28 Nov 2020 14:09) (95 - 101)  BP: 121/79 (28 Nov 2020 14:09) (111/69 - 129/85)  BP(mean): --  RR: 18 (28 Nov 2020 14:09) (16 - 18)  SpO2: 100% (28 Nov 2020 14:09) (100% - 100%)      PHYSICAL EXAM:  GENERAL: Not in distress, on oxygen via NC  CHEST/LUNG: Not using accessory muscles   HEART: s1 and s2 present  ABDOMEN:  Nontender and  Nondistended  EXTREMITIES: B/L UE edematous improved  CNS: Awake and Alert         LABS:                        8.9    6.79  )-----------( 279      ( 28 Nov 2020 06:51 )             29.6                  9.9    9.41  )-----------( 269      ( 24 Nov 2020 06:38 )             31.5                           9.4    13.14 )-----------( 149      ( 14 Nov 2020 07:58 )             28.7       11-28    149<H>  |  112<H>  |  18  ----------------------------<  72  3.7   |  29  |  0.87    Ca    8.3<L>      28 Nov 2020 06:51  Phos  3.6     11-28  Mg     1.6     11-28    TPro  6.5  /  Alb  2.0<L>  /  TBili  2.1<H>  /  DBili  x   /  AST  64<H>  /  ALT  51  /  AlkPhos  216<H>  11-28 11-25    146<H>  |  115<H>  |  21<H>  ----------------------------<  86  3.5   |  23  |  1.13    Ca    8.3<L>      25 Nov 2020 07:26  Phos  3.2     11-25  Mg     1.9     11-25    TPro  6.9  /  Alb  2.1<L>  /  TBili  2.9<H>  /  DBili  x   /  AST  55<H>  /  ALT  39  /  AlkPhos  200<H>  11-25 11-06    138  |  104  |  37<H>  ----------------------------<  81  3.9   |  25  |  2.37<H>    Ca    8.1<L>      06 Nov 2020 06:20  Phos  3.4     11-06  Mg     2.0     11-06    TPro  5.1<L>  /  Alb  2.8<L>  /  TBili  9.2<H>  /  DBili  x   /  AST  233<H>  /  ALT  99<H>  /  AlkPhos  96  11-06      Vancomycin Level, Trough (11.07.20 @ 21:49)   Vancomycin Level, Trough: 16.1:     Vancomycin Level, Trough (11.07.20 @ 07:08)   Vancomycin Level, Trough: 19.5    Vancomycin Level, Trough (11.06.20 @ 06:20)   Vancomycin Level, Trough: 19.9:       Procalcitonin, Serum (11.28.20 @ 15:38)   Procalcitonin, Serum: 1.55:       MEDICATIONS  (STANDING):    ascorbic acid 500 milliGRAM(s) Oral two times a day  DAPTOmycin IVPB 350 milliGRAM(s) IV Intermittent every 24 hours  enoxaparin Injectable 50 milliGRAM(s) SubCutaneous daily  fluconAZOLE IVPB 200 milliGRAM(s) IV Intermittent every 24 hours  fluconAZOLE IVPB      furosemide    Tablet 20 milliGRAM(s) Oral daily  meropenem  IVPB 500 milliGRAM(s) IV Intermittent every 8 hours  multivitamin/minerals 1 Tablet(s) Oral daily  nystatin Powder 1 Application(s) Topical two times a day  pantoprazole   Suspension 40 milliGRAM(s) Enteral Tube daily  rifAXIMin 550 milliGRAM(s) Oral two times a day  zinc sulfate 220 milliGRAM(s) Oral daily        RADIOLOGY & ADDITIONAL TESTS:    11/27/20 : Xray Chest 1 View- PORTABLE-Urgent (Xray Chest 1 View- PORTABLE-Urgent .) (11.27.20 @ 06:53) Increased interstitial lung markings with bilateral consolidations right greater than left. Small right pleural effusion. Overall worsening compared to prior exam.      11/24/20 : MR Pelvis Bony Only No Cont (11.24.20 @ 18:02) : Osteomyelitis of the lower sacrum and coccyx with overlying cutaneous ulceration,      11/19/20 : Xray Chest 1 View- PORTABLE-Urgent (Xray Chest 1 View- PORTABLE-Urgent .) (11.19.20 @ 23:53): Improvement in bilateral airspace disease with persistent bibasilar consolidation and small effusions.    11/4/20 : Xray Chest 1 View- PORTABLE-Routine (Xray Chest 1 View- PORTABLE-Routine in AM.) (11.04.20 @ 10:59) Reexpansion of the left lung with residual left pleural effusion and/or infiltrate.  Right pulmonary edema unchanged.  Tubes and catheters in satisfactory position.      10/25/20: Xray Chest 1 View- PORTABLE-Routine (Xray Chest 1 View- PORTABLE-Routine in AM.) (10.25.20 @ 09:21) Frontal expiratory view of the chest shows the heart to be similar in size. Endotracheal tube, right jugular line and feeding tube remain present. The lungs show similar left upper lobe infiltrate with progression of right perihilar infiltrate. Pleural effusions are similar. There is no evidence of pneumothorax.    10/21/20 : CT Abdomen and Pelvis w/ Oral Cont and w/ IV Cont (10.21.20 @ 15:59) Mural thickening of the left colon and rectum. Liquid stool in the colon. Apparent segmental mural thickening of the mid to distal small bowel and the proximal duodenum. Findings may represent nonspecific enterocolitis, noninfectious inflammatory bowel disease, or ischemic bowel. Clinical correlation is recommended. The celiac axis artery, SMA, and KINA are patent without stenosis.    Dilatation of the mid small bowel is again noted. Oral contrast has reached the terminal ileum. Findings may represent small bowel obstruction, or ileus related to nonspecific enterocolitis.   Cholelithiasis. Moderate to large ascites in the abdomen, increased since the previous examination. 5 mm nonspecific noncalcified left upper lobe lung nodule; if the patient's is in the high risk category (i.e. smoker), follow-up chest CT may be pursued in 12 months to ensure stability. Combination of atelectasis and consolidation in the left lower lobe.  Possible 1.0 cm hypodense lesion in the left lobe of the thyroid. Thyroid ultrasound may be pursued for further evaluation.   Mild bilateral pleural effusions.  Aging determinate compression fracture at T5 vertebra.          MICROBIOLOGY DATA:    MRSA/MSSA PCR (11.28.20 @ 11:34)   MRSA PCR Result.: Detected:  Urine Microscopic-Add On (NC) (11.20.20 @ 04:12)   Red Blood Cell - Urine: 5-10 /HPF   White Blood Cell - Urine: 11-25 /HPF   Calcium Oxalate Crystals: Few /HPF   Bacteria: Moderate /HPF   Comment - Urine: few amorphous urates   Epithelial Cells: Few /HPF     Culture - Blood (11.19.20 @ 10:19)   Specimen Source: .Blood Blood-Peripheral   Culture Results: No growth to date.     Culture - Surgical Swab (11.12.20 @ 05:01)   - Ampicillin: R >8 Predicts results to ampicillin/sulbactam, amoxacillin-clavulanate and piperacillin-tazobactam.   - Levofloxacin: R >4   - Linezolid: S 1   - Tetra/Doxy: R >8   - Vancomycin: R >16   Specimen Source: .Surgical Swab Sacral decub   Culture Results:   Few Enterococcus faecium (vancomycin resistant)   Rare Candida albicans "Susceptibilities not performed"   Organism Identification: Enterococcus faecium (vancomycin resistant)   Organism: Enterococcus faecium (vancomycin resistant)   Method Type: STEWART     Culture - Surgical Swab (11.12.20 @ 05:01)   Specimen Source: .Surgical Swab Sacral decub   Culture Results:  Few Enterococcus faecium Susceptibility to follow.   Rare Candida albicans "Susceptibilities not performed"     Culture - Sputum . (10.26.20 @ 00:26)   - Ampicillin/Sulbactam: R <=8/4   - Cefazolin: R >16   - Ceftazidime: I 16   - Clindamycin: R >4   - Erythromycin: R >4   - Gentamicin: S <=1 Should not be used as monotherapy   - Levofloxacin: S <=0.5   - Linezolid: S 2   - Oxacillin: R >2   - Penicillin: R >8   - RIF- Rifampin: S <=1 Should not be used as monotherapy   - Tetra/Doxy: S <=1   - Trimethoprim/Sulfamethoxazole: S <=0.5/9.5   - Trimethoprim/Sulfamethoxazole: S <=0.5/9.5   - Vancomycin: S 1     MRSA/MSSA PCR (10.21.20 @ 09:41)   MRSA PCR Result.: Detected:       Culture - Blood (10.20.20 @ 22:09)   Specimen Source: .Blood Blood   Culture Results:  No growth to date.     Culture - Blood (10.20.20 @ 22:09)   Specimen Source: .Blood Blood   Culture Results:   No growth to date.     Culture - Blood in AM (10.16.20 @ 10:13)   Specimen Source: .Blood Blood-Peripheral   Culture Results: No growth to date.     Culture - Blood in AM (10.16.20 @ 10:13)   Specimen Source: .Blood Blood-Peripheral   Culture Results: No growth to date.     Culture - Blood (10.13.20 @ 22:23)   Growth in anaerobic bottle: Gram Negative Rods   Specimen Source: .Blood Blood-Peripheral   Organism: Blood Culture PCR   Culture Results: Growth in anaerobic bottle: Bacteroides fragilis   "Susceptibilities not performed"     Culture - Blood (10.13.20 @ 22:23)   Specimen Source: .Blood Blood-Peripheral   Culture Results:   No growth to date.              Patient is seen and examined at the bed side, is afebrile after changing to Meropenem.  The Procalcitonin level is elevated, 1.55.         REVIEW OF SYSTEMS: All other review systems are negative      ALLERGIES: No Known Allergies      Vital Signs Last 24 Hrs  T(C): 36.9 (28 Nov 2020 14:09), Max: 37.1 (28 Nov 2020 05:10)  T(F): 98.5 (28 Nov 2020 14:09), Max: 98.7 (28 Nov 2020 05:10)  HR: 99 (28 Nov 2020 14:09) (95 - 101)  BP: 121/79 (28 Nov 2020 14:09) (111/69 - 129/85)  BP(mean): --  RR: 18 (28 Nov 2020 14:09) (16 - 18)  SpO2: 100% (28 Nov 2020 14:09) (100% - 100%)      PHYSICAL EXAM:  GENERAL: Not in distress, on oxygen via NC  CHEST/LUNG: Not using accessory muscles   HEART: s1 and s2 present  ABDOMEN:  Nontender and  Nondistended  EXTREMITIES: B/L UE edematous improved  CNS: Awake and Alert         LABS:                        8.9    6.79  )-----------( 279      ( 28 Nov 2020 06:51 )             29.6                  9.9    9.41  )-----------( 269      ( 24 Nov 2020 06:38 )             31.5                           9.4    13.14 )-----------( 149      ( 14 Nov 2020 07:58 )             28.7       11-28    149<H>  |  112<H>  |  18  ----------------------------<  72  3.7   |  29  |  0.87    Ca    8.3<L>      28 Nov 2020 06:51  Phos  3.6     11-28  Mg     1.6     11-28    TPro  6.5  /  Alb  2.0<L>  /  TBili  2.1<H>  /  DBili  x   /  AST  64<H>  /  ALT  51  /  AlkPhos  216<H>  11-28 11-25    146<H>  |  115<H>  |  21<H>  ----------------------------<  86  3.5   |  23  |  1.13    Ca    8.3<L>      25 Nov 2020 07:26  Phos  3.2     11-25  Mg     1.9     11-25    TPro  6.9  /  Alb  2.1<L>  /  TBili  2.9<H>  /  DBili  x   /  AST  55<H>  /  ALT  39  /  AlkPhos  200<H>  11-25 11-06    138  |  104  |  37<H>  ----------------------------<  81  3.9   |  25  |  2.37<H>    Ca    8.1<L>      06 Nov 2020 06:20  Phos  3.4     11-06  Mg     2.0     11-06    TPro  5.1<L>  /  Alb  2.8<L>  /  TBili  9.2<H>  /  DBili  x   /  AST  233<H>  /  ALT  99<H>  /  AlkPhos  96  11-06      Vancomycin Level, Trough (11.07.20 @ 21:49)   Vancomycin Level, Trough: 16.1:     Vancomycin Level, Trough (11.07.20 @ 07:08)   Vancomycin Level, Trough: 19.5    Vancomycin Level, Trough (11.06.20 @ 06:20)   Vancomycin Level, Trough: 19.9:       Procalcitonin, Serum (11.28.20 @ 15:38)   Procalcitonin, Serum: 1.55:       MEDICATIONS  (STANDING):    ascorbic acid 500 milliGRAM(s) Oral two times a day  DAPTOmycin IVPB 350 milliGRAM(s) IV Intermittent every 24 hours  enoxaparin Injectable 50 milliGRAM(s) SubCutaneous daily  fluconAZOLE IVPB 200 milliGRAM(s) IV Intermittent every 24 hours  fluconAZOLE IVPB      furosemide    Tablet 20 milliGRAM(s) Oral daily  meropenem  IVPB 500 milliGRAM(s) IV Intermittent every 8 hours  multivitamin/minerals 1 Tablet(s) Oral daily  nystatin Powder 1 Application(s) Topical two times a day  pantoprazole   Suspension 40 milliGRAM(s) Enteral Tube daily  rifAXIMin 550 milliGRAM(s) Oral two times a day  zinc sulfate 220 milliGRAM(s) Oral daily        RADIOLOGY & ADDITIONAL TESTS:    11/27/20 : Xray Chest 1 View- PORTABLE-Urgent (Xray Chest 1 View- PORTABLE-Urgent .) (11.27.20 @ 06:53) Increased interstitial lung markings with bilateral consolidations right greater than left. Small right pleural effusion. Overall worsening compared to prior exam.      11/24/20 : MR Pelvis Bony Only No Cont (11.24.20 @ 18:02) : Osteomyelitis of the lower sacrum and coccyx with overlying cutaneous ulceration,      11/19/20 : Xray Chest 1 View- PORTABLE-Urgent (Xray Chest 1 View- PORTABLE-Urgent .) (11.19.20 @ 23:53): Improvement in bilateral airspace disease with persistent bibasilar consolidation and small effusions.    11/4/20 : Xray Chest 1 View- PORTABLE-Routine (Xray Chest 1 View- PORTABLE-Routine in AM.) (11.04.20 @ 10:59) Reexpansion of the left lung with residual left pleural effusion and/or infiltrate.  Right pulmonary edema unchanged.  Tubes and catheters in satisfactory position.      10/25/20: Xray Chest 1 View- PORTABLE-Routine (Xray Chest 1 View- PORTABLE-Routine in AM.) (10.25.20 @ 09:21) Frontal expiratory view of the chest shows the heart to be similar in size. Endotracheal tube, right jugular line and feeding tube remain present. The lungs show similar left upper lobe infiltrate with progression of right perihilar infiltrate. Pleural effusions are similar. There is no evidence of pneumothorax.    10/21/20 : CT Abdomen and Pelvis w/ Oral Cont and w/ IV Cont (10.21.20 @ 15:59) Mural thickening of the left colon and rectum. Liquid stool in the colon. Apparent segmental mural thickening of the mid to distal small bowel and the proximal duodenum. Findings may represent nonspecific enterocolitis, noninfectious inflammatory bowel disease, or ischemic bowel. Clinical correlation is recommended. The celiac axis artery, SMA, and KINA are patent without stenosis.    Dilatation of the mid small bowel is again noted. Oral contrast has reached the terminal ileum. Findings may represent small bowel obstruction, or ileus related to nonspecific enterocolitis.   Cholelithiasis. Moderate to large ascites in the abdomen, increased since the previous examination. 5 mm nonspecific noncalcified left upper lobe lung nodule; if the patient's is in the high risk category (i.e. smoker), follow-up chest CT may be pursued in 12 months to ensure stability. Combination of atelectasis and consolidation in the left lower lobe.  Possible 1.0 cm hypodense lesion in the left lobe of the thyroid. Thyroid ultrasound may be pursued for further evaluation.   Mild bilateral pleural effusions.  Aging determinate compression fracture at T5 vertebra.          MICROBIOLOGY DATA:    Urine Microscopic-Add On (NC) (11.20.20 @ 04:12)   Red Blood Cell - Urine: 5-10 /HPF   White Blood Cell - Urine: 11-25 /HPF   Calcium Oxalate Crystals: Few /HPF   Bacteria: Moderate /HPF   Comment - Urine: few amorphous urates   Epithelial Cells: Few /HPF     Culture - Blood (11.19.20 @ 10:19)   Specimen Source: .Blood Blood-Peripheral   Culture Results: No growth to date.     Culture - Surgical Swab (11.12.20 @ 05:01)   - Ampicillin: R >8 Predicts results to ampicillin/sulbactam, amoxacillin-clavulanate and piperacillin-tazobactam.   - Levofloxacin: R >4   - Linezolid: S 1   - Tetra/Doxy: R >8   - Vancomycin: R >16   Specimen Source: .Surgical Swab Sacral decub   Culture Results:   Few Enterococcus faecium (vancomycin resistant)   Rare Candida albicans "Susceptibilities not performed"   Organism Identification: Enterococcus faecium (vancomycin resistant)   Organism: Enterococcus faecium (vancomycin resistant)   Method Type: STEWART     Culture - Surgical Swab (11.12.20 @ 05:01)   Specimen Source: .Surgical Swab Sacral decub   Culture Results:  Few Enterococcus faecium Susceptibility to follow.   Rare Candida albicans "Susceptibilities not performed"     Culture - Sputum . (10.26.20 @ 00:26)   - Ampicillin/Sulbactam: R <=8/4   - Cefazolin: R >16   - Ceftazidime: I 16   - Clindamycin: R >4   - Erythromycin: R >4   - Gentamicin: S <=1 Should not be used as monotherapy   - Levofloxacin: S <=0.5   - Linezolid: S 2   - Oxacillin: R >2   - Penicillin: R >8   - RIF- Rifampin: S <=1 Should not be used as monotherapy   - Tetra/Doxy: S <=1   - Trimethoprim/Sulfamethoxazole: S <=0.5/9.5   - Trimethoprim/Sulfamethoxazole: S <=0.5/9.5   - Vancomycin: S 1     MRSA/MSSA PCR (10.21.20 @ 09:41)   MRSA PCR Result.: Detected:       Culture - Blood (10.20.20 @ 22:09)   Specimen Source: .Blood Blood   Culture Results:  No growth to date.     Culture - Blood (10.20.20 @ 22:09)   Specimen Source: .Blood Blood   Culture Results:   No growth to date.     Culture - Blood in AM (10.16.20 @ 10:13)   Specimen Source: .Blood Blood-Peripheral   Culture Results: No growth to date.     Culture - Blood in AM (10.16.20 @ 10:13)   Specimen Source: .Blood Blood-Peripheral   Culture Results: No growth to date.     Culture - Blood (10.13.20 @ 22:23)   Growth in anaerobic bottle: Gram Negative Rods   Specimen Source: .Blood Blood-Peripheral   Organism: Blood Culture PCR   Culture Results: Growth in anaerobic bottle: Bacteroides fragilis   "Susceptibilities not performed"     Culture - Blood (10.13.20 @ 22:23)   Specimen Source: .Blood Blood-Peripheral   Culture Results:   No growth to date.

## 2020-11-28 NOTE — PROGRESS NOTE ADULT - ASSESSMENT
Patient is a 65y old  Female from home, lives with daughter, ambulates with walker with PMH of recurrent SBO's, s/p exp lap, SB resection in 2015, ex lap, ALBINA in 2018, DVT, PE, on Xarelto, IVC filter, chronic leg swelling, and anasarca, Now send in to the ER by her PCP, Dr. Ross, for evaluation of  generalized weakness and hypotension BP of 80/40 during office visit. On admission, she found to have no fever and BP was in lower normal range and no Leukocytosis. The CXR is clear and CT abd/pelvis consistent with Ileus. The ID consult requested to assist with evaluation of infectious etiology of episode of hypotension. Found to have Bacteroides bacteremia and now developed septic shock on Cefepime and Flagyl. Hence transferred to ICU. 10/20/20.    # Hypotension  at office- BP of 80/40 - resolved   #  Bacteroides fragilis Bacteremia - 10/13/20 - ? source most likely GI- Repeat Blood Cxs have NGTD 10/16/20  # Ileus  - h/o recurrent SBO and s/p EXp. LAp  # COVID 19 negative   # Septic shock ( Hypothermia + hypotensive)- transferred to ICU since requiring pressor- source GI, The CT abd/pelvis shows nonspecific enterocolitis, noninfectious inflammatory bowel disease, or ischemic bowel, along with ileus.   # Pneumonia- HCAP vs Aspiration - on CXR 10/24 and CT chest 10/21- sputum Cx grew Methicillin resistant Staphylococcus aureus  and Stenotrophomonas maltophilia.  # S/p extubated 11/9/20  # Infected sacral ulcer- s/p debridement and culture grew Enterococcus, VRE and Candida, sensitivity is pending- The MRI of pelvis shows  Osteomyelitis of the lower sacrum and coccyx with overlying cutaneous ulceration.   # Fever- 11/8 and 11/19- BCX NGTD 11/19 - The CXR shows Improvement in bilateral airspace disease with persistent bibasilar consolidation. She is s/p removal of velazquez catheter and passed TOV.  # HCAP- 11/27/20,  Intermittent fever and CXR shows  bilateral consolidations right greater than left.      would recommend:    1. Please obtain MRSA PCR and if positive then will change Daptomycin to Linezolid since Daptomycin does not cover pneumonia, get destroys in Lung with surfactant  2. Please change Cefepime to Meropenem to cover resistant organisms since still spiking fever  3. Surgical follow up for further debridement of sacral wound since still spiking fever  4. Monitor Temp. and c/w supportive care  5. Continue Daptomycin to cover VRE  and  Fluconazole to cover Candida in sacral wound culture X 6  weeks to treat Osteomyelitis, might change to Linezolid for last two weeks if she is OFf midodrine   6. Aspiration precaution  7.  frequent repositioning     d/w Covering Sushma POOL    Attending Attestation:    Spent more than 45 minutes on total encounter, more than 50 % of the visit was spent counseling and/or coordinating care by the Attending physician.   Patient is a 65y old  Female from home, lives with daughter, ambulates with walker with PMH of recurrent SBO's, s/p exp lap, SB resection in 2015, ex lap, ALBINA in 2018, DVT, PE, on Xarelto, IVC filter, chronic leg swelling, and anasarca, Now send in to the ER by her PCP, Dr. Ross, for evaluation of  generalized weakness and hypotension BP of 80/40 during office visit. On admission, she found to have no fever and BP was in lower normal range and no Leukocytosis. The CXR is clear and CT abd/pelvis consistent with Ileus. The ID consult requested to assist with evaluation of infectious etiology of episode of hypotension. Found to have Bacteroides bacteremia and now developed septic shock on Cefepime and Flagyl. Hence transferred to ICU. 10/20/20.    # Hypotension  at office- BP of 80/40 - resolved   #  Bacteroides fragilis Bacteremia - 10/13/20 - ? source most likely GI- Repeat Blood Cxs have NGTD 10/16/20  # Ileus  - h/o recurrent SBO and s/p EXp. LAp  # COVID 19 negative   # Septic shock ( Hypothermia + hypotensive)- transferred to ICU since requiring pressor- source GI, The CT abd/pelvis shows nonspecific enterocolitis, noninfectious inflammatory bowel disease, or ischemic bowel, along with ileus.   # Pneumonia- HCAP vs Aspiration - on CXR 10/24 and CT chest 10/21- sputum Cx grew Methicillin resistant Staphylococcus aureus  and Stenotrophomonas maltophilia.  # S/p extubated 11/9/20  # Infected sacral ulcer- s/p debridement and culture grew Enterococcus, VRE and Candida, sensitivity is pending- The MRI of pelvis shows  Osteomyelitis of the lower sacrum and coccyx with overlying cutaneous ulceration.   # Fever- 11/8 and 11/19- BCX NGTD 11/19 - The CXR shows Improvement in bilateral airspace disease with persistent bibasilar consolidation. She is s/p removal of velazquez catheter and passed TOV.  # HCAP- 11/27/20,  Intermittent fever and CXR shows  bilateral consolidations right greater than left.      would recommend:    1. Follow up MRSA PCR and if positive then will change Daptomycin to Linezolid since Daptomycin does not cover pneumonia, get destroys in Lung with surfactant  2. Continue Meropenem to cover resistant organisms since still spiking fever  3. Surgical follow up for further debridement of sacral wound since still spiking fever  4. Monitor Temp. and c/w supportive care  5. Continue Daptomycin to cover VRE  and  Fluconazole to cover Candida in sacral wound culture X 6  weeks to treat Osteomyelitis, might change to Linezolid for last two weeks if she is OFf midodrine   6. Aspiration precaution  7.  frequent repositioning       Attending Attestation:    Spent more than 45 minutes on total encounter, more than 50 % of the visit was spent counseling and/or coordinating care by the Attending physician.

## 2020-11-28 NOTE — PROGRESS NOTE ADULT - PROBLEM SELECTOR PLAN 10
DVT and GI prophylaxis  Continue rifaximin  Dapto for total of 6 weeks with end date 112/28/2020 (11/17 - 12/28/2020  plan for PICC possibly Monday.   Discharge planning likely to [facility] when medically optimized

## 2020-11-28 NOTE — PROGRESS NOTE ADULT - PROBLEM SELECTOR PLAN 1
Secondary to bacteremia . Bacteroides fragilis per BC on 10/13  Sputum positive for MRSA and Stenotrophomonas  Aspiration pneumonia vs HCAP   Repeat BC 10/16 and 11/19 NGTD   UC on 11/20 NGTD   Infected sacral ulcer- s/p debridement and culture grew Enterococcus (VRE) and Candida ( sensitivity is pending)  Leukocytosis resolved, Cdiff negative  on Dapto and Fluconazole  MRI sacrum/pelvis shows OM   Echo and PAVAN negative for endocarditis  PICC  for extended abx (for total of 6 weeks, thru Dec 28th). IR informed. PICC in IR likely Monday    Monitor CK weekly while on Dapto  velazquez  discontinued 11/19.   Permafit as needed.   ID Dr. Patricio following

## 2020-11-28 NOTE — PROGRESS NOTE ADULT - SUBJECTIVE AND OBJECTIVE BOX
INTERVAL HPI/OVERNIGHT EVENTS:       Antimicrobial:  DAPTOmycin IVPB 350 milliGRAM(s) IV Intermittent every 24 hours  fluconAZOLE IVPB 200 milliGRAM(s) IV Intermittent every 24 hours  fluconAZOLE IVPB      meropenem  IVPB 500 milliGRAM(s) IV Intermittent every 8 hours  rifAXIMin 550 milliGRAM(s) Oral two times a day    Cardiovascular:  furosemide    Tablet 20 milliGRAM(s) Oral daily    Pulmonary:    Hematalogic:  enoxaparin Injectable 50 milliGRAM(s) SubCutaneous daily    Other:  acetaminophen   Tablet .. 650 milliGRAM(s) Oral every 6 hours PRN  ascorbic acid 500 milliGRAM(s) Oral two times a day  multivitamin/minerals 1 Tablet(s) Oral daily  nystatin Powder 1 Application(s) Topical two times a day  pantoprazole   Suspension 40 milliGRAM(s) Enteral Tube daily  zinc sulfate 220 milliGRAM(s) Oral daily      Drug Dosing Weight  Height (cm): 167.6 (21 Oct 2020 02:26)  Weight (kg): 56.2 (21 Oct 2020 02:26)  BMI (kg/m2): 20 (21 Oct 2020 02:26)  BSA (m2): 1.63 (21 Oct 2020 02:26)    CENTRAL LINE: [ ] YES [ ] NO  LOCATION:   DATE INSERTED:    TREJO: [ ] YES [ ] NO    DATE INSERTED:    A-LINE:  [ ] YES [ ] NO  LOCATION:   DATE INSERTED:    PMH/Social Hx/Fam Hx -reviewed admission note, no change since admission  PAST MEDICAL & SURGICAL HISTORY:  Anasarca    Pulmonary embolism    DVT (deep venous thrombosis)    SBO (small bowel obstruction)    H/O exploratory laparotomy        T(C): 37.1 (20 @ 05:10), Max: 37.1 (20 @ 05:10)  HR: 95 (20 @ 05:10)  BP: 111/69 (20 @ 05:10)  BP(mean): --  ABP: --  ABP(mean): --  RR: 16 (20 @ 05:10)  SpO2: 100% (20 @ 05:10)  Wt(kg): --                PHYSICAL EXAM:    GENERAL: No signs of distress, comfortable  HEAD: Atraumatic, Normocephalic  EYES: EOMI, PERRLA  ENMT: No erythema, exudates, or enlargement, Moist mucous membranes  NECK: Supple, normal appearance, No JVD; [  ] central line (if applicable)  CHEST/LUNG: No chest deformity, fair bilateral air entry; No rales, rhonchi, wheezing; crackles  HEART: Regular rate and rhythm; No murmurs, rubs, or gallops;   ABDOMEN: Soft, Nontender, Nondistended; Bowel sounds present  EXTREMITIES:  + Peripheral Pulses, No clubbing, cyanosis, or edema  NERVOUS SYSTEM: awake and alert x 3, follows commands, upper and lower extremities  LYMPH: No lymphadenopathy noted  SKIN: No rashes or lesions; good turgor, warm, dry      LABS:  CBC Full  -  ( 2020 06:51 )  WBC Count : 6.79 K/uL  RBC Count : 3.16 M/uL  Hemoglobin : 8.9 g/dL  Hematocrit : 29.6 %  Platelet Count - Automated : 279 K/uL  Mean Cell Volume : 93.7 fl  Mean Cell Hemoglobin : 28.2 pg  Mean Cell Hemoglobin Concentration : 30.1 gm/dL  Auto Neutrophil # : x  Auto Lymphocyte # : x  Auto Monocyte # : x  Auto Eosinophil # : x  Auto Basophil # : x  Auto Neutrophil % : x  Auto Lymphocyte % : x  Auto Monocyte % : x  Auto Eosinophil % : x  Auto Basophil % : x    -    149<H>  |  112<H>  |  18  ----------------------------<  72  3.7   |  29  |  0.87    Ca    8.3<L>      2020 06:51  Phos  3.6       Mg     1.6         TPro  6.5  /  Alb  2.0<L>  /  TBili  2.1<H>  /  DBili  x   /  AST  64<H>  /  ALT  51  /  AlkPhos  216<H>      PT/INR - ( 2020 10:02 )   PT: 13.3 sec;   INR: 1.12 ratio         PTT - ( 2020 10:02 )  PTT:32.1 sec  Urinalysis Basic - ( 2020 18:35 )    Color: Yellow / Appearance: Clear / S.010 / pH: x  Gluc: x / Ketone: Negative  / Bili: Negative / Urobili: Negative   Blood: x / Protein: Negative / Nitrite: Negative   Leuk Esterase: Negative / RBC: 2-5 /HPF / WBC 0-2 /HPF   Sq Epi: x / Non Sq Epi: Few /HPF / Bacteria: Few /HPF      Culture Results:   No growth to date. ( @ 14:08)  Culture Results:   No growth to date. ( @ 14:08)      RADIOLOGY & ADDITIONAL STUDIES REVIEWED         IMPRESSION:  PAST MEDICAL & SURGICAL HISTORY:  Anasarca    Pulmonary embolism    DVT (deep venous thrombosis)    SBO (small bowel obstruction)    H/O exploratory laparotomy     p/w               Plan:      CNS:    PULMONARY:    CARDIAC:    GI:    RENAL:    SKIN:    MUSCULOSKELETAL:    ID:    HEME:    DVT and GI Prophylaxis    GOALS OF CARE DISCUSSION WITH PATIENT/FAMILY/PROXY    CRITICAL CARE TIME SPENT: 35 minutes

## 2020-11-28 NOTE — PROGRESS NOTE ADULT - SUBJECTIVE AND OBJECTIVE BOX
Patient was seen and examined  Patient is a 65y old  Female who presents with a chief complaint of Hypotension (2020 15:46)      INTERVAL HPI/OVERNIGHT EVENTS:  T(C): 37.1 (20 @ 05:10), Max: 37.1 (20 @ 05:10)  HR: 95 (20 @ 05:10) (95 - 101)  BP: 111/69 (20 @ 05:10) (111/69 - 132/85)  RR: 16 (20 @ 05:10) (16 - 18)  SpO2: 100% (20 @ 05:10) (99% - 100%)  Wt(kg): --  I&O's Summary      LABS:                        8.9    6.79  )-----------( 279      ( 2020 06:51 )             29.6     -    149<H>  |  112<H>  |  18  ----------------------------<  72  3.7   |  29  |  0.87    Ca    8.3<L>      2020 06:51  Phos  3.6       Mg     1.6         TPro  6.5  /  Alb  2.0<L>  /  TBili  2.1<H>  /  DBili  x   /  AST  64<H>  /  ALT  51  /  AlkPhos  216<H>      PT/INR - ( 2020 10:02 )   PT: 13.3 sec;   INR: 1.12 ratio         PTT - ( 2020 10:02 )  PTT:32.1 sec  Urinalysis Basic - ( 2020 18:35 )    Color: Yellow / Appearance: Clear / S.010 / pH: x  Gluc: x / Ketone: Negative  / Bili: Negative / Urobili: Negative   Blood: x / Protein: Negative / Nitrite: Negative   Leuk Esterase: Negative / RBC: 2-5 /HPF / WBC 0-2 /HPF   Sq Epi: x / Non Sq Epi: Few /HPF / Bacteria: Few /HPF      CAPILLARY BLOOD GLUCOSE              Urinalysis Basic - ( 2020 18:35 )    Color: Yellow / Appearance: Clear / S.010 / pH: x  Gluc: x / Ketone: Negative  / Bili: Negative / Urobili: Negative   Blood: x / Protein: Negative / Nitrite: Negative   Leuk Esterase: Negative / RBC: 2-5 /HPF / WBC 0-2 /HPF   Sq Epi: x / Non Sq Epi: Few /HPF / Bacteria: Few /HPF        MEDICATIONS  (STANDING):  ascorbic acid 500 milliGRAM(s) Oral two times a day  DAPTOmycin IVPB 350 milliGRAM(s) IV Intermittent every 24 hours  enoxaparin Injectable 50 milliGRAM(s) SubCutaneous daily  fluconAZOLE IVPB 200 milliGRAM(s) IV Intermittent every 24 hours  fluconAZOLE IVPB      furosemide    Tablet 20 milliGRAM(s) Oral daily  meropenem  IVPB 500 milliGRAM(s) IV Intermittent every 8 hours  multivitamin/minerals 1 Tablet(s) Oral daily  nystatin Powder 1 Application(s) Topical two times a day  pantoprazole   Suspension 40 milliGRAM(s) Enteral Tube daily  rifAXIMin 550 milliGRAM(s) Oral two times a day  zinc sulfate 220 milliGRAM(s) Oral daily    MEDICATIONS  (PRN):  acetaminophen   Tablet .. 650 milliGRAM(s) Oral every 6 hours PRN Temp greater or equal to 38C (100.4F)      RADIOLOGY & ADDITIONAL TESTS:    Imaging Personally Reviewed:  [ ] YES  [ ] NO    REVIEW OF SYSTEMS:  CONSTITUTIONAL: No fever, weight loss, or fatigue  EYES: No eye pain, visual disturbances, or discharge  ENMT:  No difficulty hearing, tinnitus, vertigo; No sinus or throat pain  NECK: No pain or stiffness  BREASTS: No pain, masses, or nipple discharge  RESPIRATORY: No cough, wheezing, chills or hemoptysis; No shortness of breath  CARDIOVASCULAR: No chest pain, palpitations, dizziness, or leg swelling  GASTROINTESTINAL: No abdominal or epigastric pain. No nausea, vomiting, or hematemesis; No diarrhea or constipation. No melena or hematochezia.  GENITOURINARY: No dysuria, frequency, hematuria, or incontinence  NEUROLOGICAL: No headaches, memory loss, loss of strength, numbness, or tremors  SKIN: No itching, burning, rashes, or lesions   LYMPH NODES: No enlarged glands  ENDOCRINE: No heat or cold intolerance; No hair loss  MUSCULOSKELETAL: No joint pain or swelling; No muscle, back, or extremity pain  PSYCHIATRIC: No depression, anxiety, mood swings, or difficulty sleeping  HEME/LYMPH: No easy bruising, or bleeding gums  ALLERY AND IMMUNOLOGIC: No hives or eczema      Consultant(s) Notes Reviewed:  [ x ] YES  [ ] NO    PHYSICAL EXAM:  GENERAL: NAD, well-groomed, well-developed  HEAD:  Atraumatic, Normocephalic  EYES: EOMI, PERRLA, conjunctiva and sclera clear  ENMT: No tonsillar erythema, exudates, or enlargement; Moist mucous membranes, Good dentition, No lesions  NECK: Supple, No JVD, Normal thyroid  NERVOUS SYSTEM:  Alert & Oriented X3, Good concentration; Motor Strength 5/5 B/L upper and lower extremities; DTRs 2+ intact and symmetric  CHEST/LUNG: Clear to percussion bilaterally; No rales, rhonchi, wheezing, or rubs  HEART: Regular rate and rhythm; No murmurs, rubs, or gallops  ABDOMEN: Soft, Nontender, Nondistended; Bowel sounds present  EXTREMITIES:  2+ Peripheral Pulses, No clubbing, cyanosis, or edema  LYMPH: No lymphadenopathy noted  SKIN: No rashes or lesions    Care Discussed with Consultants/Other Providers [ x] YES  [ ] NO

## 2020-11-28 NOTE — PROGRESS NOTE ADULT - SUBJECTIVE AND OBJECTIVE BOX
MARIBETH PADILLA    SCU progress note; pt seen, chart reviewed, d/w rounding attending    INTERVAL HPI/OVERNIGHT EVENTS: no significant issues overnight, ate breakfast well; feels "ok'    DNR [x ]   TRIAL OF INTUBATION, trial feeding tube/ trial of IV fluids/abx    Covid - 19 PCR: not detected 2020    The 4Ms    What Matters Most: see GOC  Age appropriate Medications/Screen for High Risk Medication: Yes  Mentation: see CAM below  Mobility: defer to physical exam    The Confusion Assessment Method (CAM) Diagnostic Algorithm     1: Acute Onset or Fluctuating Course  - Is there evidence of an acute change in mental status from the patient’s baseline? Did the (abnormal) behavior  fluctuate during the day, that is, tend to come and go, or increase and decrease in severity?  [ ] YES [x ] NO     2: Inattention  - Did the patient have difficulty focusing attention, being easily distractible, or having difficulty keeping track of what was being said?  [ ] YES [x ] NO     3: Disorganized thinking  -Was the patient’s thinking disorganized or incoherent, such as rambling or irrelevant conversation, unclear or illogical flow of ideas, or unpredictable switching from subject to subject?  [ ] YES [x ] NO    4: Altered Level of consciousness?  [ ] YES [x ] NO    The diagnosis of delirium by CAM requires the presence of features 1 and 2 and either 3 or 4.    PRESSORS: [ ] YES [x ] NO  DAPTOmycin IVPB 350 milliGRAM(s) IV Intermittent every 24 hours  fluconAZOLE IVPB 200 milliGRAM(s) IV Intermittent every 24 hours  fluconAZOLE IVPB      meropenem  IVPB 500 milliGRAM(s) IV Intermittent every 8 hours  rifAXIMin 550 milliGRAM(s) Oral two times a day    Cardiovascular:  Heart Failure  Acute   Acute on Chronic  Chronic       furosemide    Tablet 20 milliGRAM(s) Oral daily    Pulmonary:    Hematalogic:  enoxaparin Injectable 50 milliGRAM(s) SubCutaneous daily    Other:  acetaminophen   Tablet .. 650 milliGRAM(s) Oral every 6 hours PRN  ascorbic acid 500 milliGRAM(s) Oral two times a day  multivitamin/minerals 1 Tablet(s) Oral daily  nystatin Powder 1 Application(s) Topical two times a day  pantoprazole   Suspension 40 milliGRAM(s) Enteral Tube daily  zinc sulfate 220 milliGRAM(s) Oral daily    acetaminophen   Tablet .. 650 milliGRAM(s) Oral every 6 hours PRN  ascorbic acid 500 milliGRAM(s) Oral two times a day  DAPTOmycin IVPB 350 milliGRAM(s) IV Intermittent every 24 hours  enoxaparin Injectable 50 milliGRAM(s) SubCutaneous daily  fluconAZOLE IVPB 200 milliGRAM(s) IV Intermittent every 24 hours  fluconAZOLE IVPB      furosemide    Tablet 20 milliGRAM(s) Oral daily  meropenem  IVPB 500 milliGRAM(s) IV Intermittent every 8 hours  multivitamin/minerals 1 Tablet(s) Oral daily  nystatin Powder 1 Application(s) Topical two times a day  pantoprazole   Suspension 40 milliGRAM(s) Enteral Tube daily  rifAXIMin 550 milliGRAM(s) Oral two times a day  zinc sulfate 220 milliGRAM(s) Oral daily    Drug Dosing Weight  Height (cm): 167.6 (21 Oct 2020 02:26)  Weight (kg): 56.2 (21 Oct 2020 02:26)  BMI (kg/m2): 20 (21 Oct 2020 02:26)  BSA (m2): 1.63 (21 Oct 2020 02:26)    CENTRAL LINE: [ ] YES [ ] NO  LOCATION:   DATE INSERTED:  REMOVE: [ ] YES [ ] NO  EXPLAIN:    TREJO: [ ] YES [ ] NO    DATE INSERTED:  REMOVE:  [ ] YES [ ] NO  EXPLAIN:    PAST MEDICAL & SURGICAL HISTORY:  Anasarca    Pulmonary embolism    DVT (deep venous thrombosis)    SBO (small bowel obstruction)    H/O exploratory laparotomy        PHYSICAL EXAM:    GENERAL: cachectic,  NAD  HEAD:  Atraumatic, Normocephalic  EYES: EOMI, PERRLA, conjunctiva and sclera clear  ENMT: No tonsillar erythema, exudates, or enlargement; Moist mucous membranes, Good dentition, No lesions  NECK: Supple, No JVD, Normal thyroid  NERVOUS SYSTEM:  Alert & Oriented X3,  SILVA.   CHEST/LUNG: Unlabored. Clear to percussion bilaterally; No rales, rhonchi, wheezing, or rubs  HEART: Regular rate and rhythm; No murmurs, rubs, or gallops  ABDOMEN: Soft, Nontender, Nondistended; Bowel sounds present  EXTREMITIES:  2+ Peripheral Pulses, No clubbing, cyanosis, or edema  LYMPH: No lymphadenopathy noted  SKIN: Unstageable sacral DTI    LABS:  CBC Full  -  ( 2020 06:51 )  WBC Count : 6.79 K/uL  RBC Count : 3.16 M/uL  Hemoglobin : 8.9 g/dL  Hematocrit : 29.6 %  Platelet Count - Automated : 279 K/uL  Mean Cell Volume : 93.7 fl  Mean Cell Hemoglobin : 28.2 pg  Mean Cell Hemoglobin Concentration : 30.1 gm/dL  Auto Neutrophil # : x  Auto Lymphocyte # : x  Auto Monocyte # : x  Auto Eosinophil # : x  Auto Basophil # : x  Auto Neutrophil % : x  Auto Lymphocyte % : x  Auto Monocyte % : x  Auto Eosinophil % : x  Auto Basophil % : x        149<H>  |  112<H>  |  18  ----------------------------<  72  3.7   |  29  |  0.87    Ca    8.3<L>      2020 06:51  Phos  3.6       Mg     1.6         TPro  6.5  /  Alb  2.0<L>  /  TBili  2.1<H>  /  DBili  x   /  AST  64<H>  /  ALT  51  /  AlkPhos  216<H>      PT/INR - ( 2020 10:02 )   PT: 13.3 sec;   INR: 1.12 ratio       PTT - ( 2020 10:02 )  PTT:32.1 sec  Urinalysis Basic - ( 2020 18:35 )    Color: Yellow / Appearance: Clear / S.010 / pH: x  Gluc: x / Ketone: Negative  / Bili: Negative / Urobili: Negative   Blood: x / Protein: Negative / Nitrite: Negative   Leuk Esterase: Negative / RBC: 2-5 /HPF / WBC 0-2 /HPF   Sq Epi: x / Non Sq Epi: Few /HPF / Bacteria: Few /HPF    [x]  DVT Prophylaxis    RADIOLOGY & ADDITIONAL STUDIES:  reviewed    Goals of Care Discussion   - see consult note from palliative team 10/23 and subsequent progress notes

## 2020-11-28 NOTE — PROGRESS NOTE ADULT - PROBLEM SELECTOR PLAN 9
- CXR 11/27  showed bilateral consolidation and pleural effusion, as listed above  treated with IV lasix 40mg IV x 2 doses (11/27 and 11/28)   - Continue PO Lasix daily 20mg daily;  -Monitor BP; Adjust lasix as needed to maintain BP within parameters  -Monitor urine output;   -CXR f/u ordered   - Monitor CMP daily  -Dr. Sree Villarreal

## 2020-11-29 NOTE — PROGRESS NOTE ADULT - ASSESSMENT
Patient is a 65y old  Female from home, lives with daughter, ambulates with walker with PMH of recurrent SBO's, s/p exp lap, SB resection in 2015, ex lap, ALBINA in 2018, DVT, PE, on Xarelto, IVC filter, chronic leg swelling, and anasarca, Now send in to the ER by her PCP, Dr. Ross, for evaluation of  generalized weakness and hypotension BP of 80/40 during office visit. On admission, she found to have no fever and BP was in lower normal range and no Leukocytosis. The CXR is clear and CT abd/pelvis consistent with Ileus. The ID consult requested to assist with evaluation of infectious etiology of episode of hypotension. Found to have Bacteroides bacteremia and now developed septic shock on Cefepime and Flagyl. Hence transferred to ICU. 10/20/20.    # Hypotension  at office- BP of 80/40 - resolved   #  Bacteroides fragilis Bacteremia - 10/13/20 - ? source most likely GI- Repeat Blood Cxs have NGTD 10/16/20  # Ileus  - h/o recurrent SBO and s/p EXp. LAp  # COVID 19 negative   # Septic shock ( Hypothermia + hypotensive)- transferred to ICU since requiring pressor- source GI, The CT abd/pelvis shows nonspecific enterocolitis, noninfectious inflammatory bowel disease, or ischemic bowel, along with ileus.   # Pneumonia- HCAP vs Aspiration - on CXR 10/24 and CT chest 10/21- sputum Cx grew Methicillin resistant Staphylococcus aureus  and Stenotrophomonas maltophilia.  # S/p extubated 11/9/20  # Infected sacral ulcer- s/p debridement and culture grew Enterococcus, VRE and Candida, sensitivity is pending- The MRI of pelvis shows  Osteomyelitis of the lower sacrum and coccyx with overlying cutaneous ulceration.   # Fever- 11/8 and 11/19- BCX NGTD 11/19 - The CXR shows Improvement in bilateral airspace disease with persistent bibasilar consolidation. She is s/p removal of velazquez catheter and passed TOV.  # HCAP- 11/27/20,  Intermittent fever and CXR shows  bilateral consolidations right greater than left.      would recommend:    1.  if spikes fever  then will change Daptomycin to Linezolid since Daptomycin does not cover pneumonia, get destroys in Lung with surfactant  2. Continue Meropenem to cover resistant organisms since became afebrile  3. Surgical follow up for further debridement of sacral wound   4. Continue Daptomycin to cover VRE  and  Fluconazole to cover Candida in sacral wound culture X 6  weeks to treat Osteomyelitis, might change to Linezolid for last two weeks if she is OFf midodrine   5. Aspiration precaution  6. Frequent repositioning     d/w Covering NPTITUS    Attending Attestation:    Spent more than 45 minutes on total encounter, more than 50 % of the visit was spent counseling and/or coordinating care by the Attending physician. Patient is a 65y old  Female from home, lives with daughter, ambulates with walker with PMH of recurrent SBO's, s/p exp lap, SB resection in 2015, ex lap, ALBINA in 2018, DVT, PE, on Xarelto, IVC filter, chronic leg swelling, and anasarca, Now send in to the ER by her PCP, Dr. Ross, for evaluation of  generalized weakness and hypotension BP of 80/40 during office visit. On admission, she found to have no fever and BP was in lower normal range and no Leukocytosis. The CXR is clear and CT abd/pelvis consistent with Ileus. The ID consult requested to assist with evaluation of infectious etiology of episode of hypotension. Found to have Bacteroides bacteremia and now developed septic shock on Cefepime and Flagyl. Hence transferred to ICU. 10/20/20.    # Hypotension  at office- BP of 80/40 - resolved   #  Bacteroides fragilis Bacteremia - 10/13/20 - ? source most likely GI- Repeat Blood Cxs have NGTD 10/16/20  # Ileus  - h/o recurrent SBO and s/p EXp. LAp  # COVID 19 negative   # Septic shock ( Hypothermia + hypotensive)- transferred to ICU since requiring pressor- source GI, The CT abd/pelvis shows nonspecific enterocolitis, noninfectious inflammatory bowel disease, or ischemic bowel, along with ileus.   # Pneumonia- HCAP vs Aspiration - on CXR 10/24 and CT chest 10/21- sputum Cx grew Methicillin resistant Staphylococcus aureus  and Stenotrophomonas maltophilia.  # S/p extubated 11/9/20  # Infected sacral ulcer- s/p debridement and culture grew Enterococcus, VRE and Candida, sensitivity is pending- The MRI of pelvis shows  Osteomyelitis of the lower sacrum and coccyx with overlying cutaneous ulceration.   # Fever- 11/8 and 11/19- BCX NGTD 11/19 - The CXR shows Improvement in bilateral airspace disease with persistent bibasilar consolidation. She is s/p removal of velazquez catheter and passed TOV.  # HCAP- 11/27/20,  Intermittent fever and CXR shows  bilateral consolidations right greater than left. - The procalcitonin level is 1.55      would recommend:    1.  if spikes fever  then will change Daptomycin to Linezolid since Daptomycin does not cover pneumonia, get destroys in Lung with surfactant  2. Continue Meropenem to cover resistant organisms since became afebrile  3. Surgical follow up for further debridement of sacral wound   4. Continue Daptomycin to cover VRE  and  Fluconazole to cover Candida in sacral wound culture X 6  weeks to treat Osteomyelitis, might change to Linezolid for last two weeks if she is OFf midodrine   5. Aspiration precaution  6. Frequent repositioning     d/w Covering NP, TITUS    Attending Attestation:    Spent more than 45 minutes on total encounter, more than 50 % of the visit was spent counseling and/or coordinating care by the Attending physician.

## 2020-11-29 NOTE — PROGRESS NOTE ADULT - PROBLEM SELECTOR PLAN 10
DVT and GI prophylaxis  Continue rifaximin  Dapto for total of 6 weeks with end date 12/28/2020 (11/17 - 12/28/2020  plan for PICC possibly Monday. Hold lovenox on Monday   Discharge planning likely to [facility] when medically optimized

## 2020-11-29 NOTE — PROGRESS NOTE ADULT - PROBLEM SELECTOR PLAN 2
Echo shows improved EF   s/p IV Lasix 11/27 for pleural effusions  Currently appears euvolemic  Lasix po 20mg with hold parameters   F/u CXR   Continue supportive care.

## 2020-11-29 NOTE — PROGRESS NOTE ADULT - SUBJECTIVE AND OBJECTIVE BOX
Patient was seen and examined  Patient is a 65y old  Female who presents with a chief complaint of Hypotension (2020 11:56)      INTERVAL HPI/OVERNIGHT EVENTS:  T(C): 37.1 (20 @ 13:22), Max: 37.1 (20 @ 22:42)  HR: 97 (20 @ 13:22) (97 - 104)  BP: 116/77 (20 @ 05:20) (112/62 - 116/77)  RR: 18 (20 @ 13:22) (18 - 20)  SpO2: 100% (20 @ 13:22) (100% - 100%)  Wt(kg): --  I&O's Summary    2020 07:01  -  2020 07:00  --------------------------------------------------------  IN: 100 mL / OUT: 0 mL / NET: 100 mL        LABS:                        8.9    5.83  )-----------( 285      ( 2020 06:46 )             29.4         147<H>  |  111<H>  |  19<H>  ----------------------------<  74  3.4<L>   |  30  |  0.86    Ca    8.1<L>      2020 06:46  Phos  3.4       Mg     1.5         TPro  6.5  /  Alb  2.0<L>  /  TBili  2.0<H>  /  DBili  x   /  AST  67<H>  /  ALT  48  /  AlkPhos  245<H>        Urinalysis Basic - ( 2020 18:35 )    Color: Yellow / Appearance: Clear / S.010 / pH: x  Gluc: x / Ketone: Negative  / Bili: Negative / Urobili: Negative   Blood: x / Protein: Negative / Nitrite: Negative   Leuk Esterase: Negative / RBC: 2-5 /HPF / WBC 0-2 /HPF   Sq Epi: x / Non Sq Epi: Few /HPF / Bacteria: Few /HPF      CAPILLARY BLOOD GLUCOSE      POCT Blood Glucose.: 87 mg/dL (2020 05:28)  POCT Blood Glucose.: 149 mg/dL (2020 23:18)          Urinalysis Basic - ( 2020 18:35 )    Color: Yellow / Appearance: Clear / S.010 / pH: x  Gluc: x / Ketone: Negative  / Bili: Negative / Urobili: Negative   Blood: x / Protein: Negative / Nitrite: Negative   Leuk Esterase: Negative / RBC: 2-5 /HPF / WBC 0-2 /HPF   Sq Epi: x / Non Sq Epi: Few /HPF / Bacteria: Few /HPF        MEDICATIONS  (STANDING):  ascorbic acid 500 milliGRAM(s) Oral two times a day  DAPTOmycin IVPB 350 milliGRAM(s) IV Intermittent every 24 hours  enoxaparin Injectable 50 milliGRAM(s) SubCutaneous daily  fluconAZOLE IVPB 200 milliGRAM(s) IV Intermittent every 24 hours  fluconAZOLE IVPB      furosemide    Tablet 20 milliGRAM(s) Oral daily  meropenem  IVPB 500 milliGRAM(s) IV Intermittent every 8 hours  multivitamin/minerals 1 Tablet(s) Oral daily  nystatin Powder 1 Application(s) Topical two times a day  pantoprazole   Suspension 40 milliGRAM(s) Enteral Tube daily  rifAXIMin 550 milliGRAM(s) Oral two times a day  zinc sulfate 220 milliGRAM(s) Oral daily    MEDICATIONS  (PRN):  acetaminophen   Tablet .. 650 milliGRAM(s) Oral every 6 hours PRN Temp greater or equal to 38C (100.4F)  ALBUTerol    90 MICROgram(s) HFA Inhaler 2 Puff(s) Inhalation every 6 hours PRN Shortness of Breath and/or Wheezing      RADIOLOGY & ADDITIONAL TESTS:    Imaging Personally Reviewed:  [ ] YES  [ ] NO    REVIEW OF SYSTEMS:  CONSTITUTIONAL: No fever, weight loss, or fatigue  EYES: No eye pain, visual disturbances, or discharge  ENMT:  No difficulty hearing, tinnitus, vertigo; No sinus or throat pain  NECK: No pain or stiffness  BREASTS: No pain, masses, or nipple discharge  RESPIRATORY: No cough, wheezing, chills or hemoptysis; No shortness of breath  CARDIOVASCULAR: No chest pain, palpitations, dizziness, or leg swelling  GASTROINTESTINAL: No abdominal or epigastric pain. No nausea, vomiting, or hematemesis; No diarrhea or constipation. No melena or hematochezia.  GENITOURINARY: No dysuria, frequency, hematuria, or incontinence  NEUROLOGICAL: No headaches, memory loss, loss of strength, numbness, or tremors  SKIN: No itching, burning, rashes, or lesions   LYMPH NODES: No enlarged glands  ENDOCRINE: No heat or cold intolerance; No hair loss  MUSCULOSKELETAL: No joint pain or swelling; No muscle, back, or extremity pain  PSYCHIATRIC: No depression, anxiety, mood swings, or difficulty sleeping  HEME/LYMPH: No easy bruising, or bleeding gums  ALLERY AND IMMUNOLOGIC: No hives or eczema      Consultant(s) Notes Reviewed:  [ ] YES  [ ] NO    PHYSICAL EXAM:  GENERAL: NAD, well-groomed, well-developed  HEAD:  Atraumatic, Normocephalic  EYES: EOMI, PERRLA, conjunctiva and sclera clear  ENMT: No tonsillar erythema, exudates, or enlargement; Moist mucous membranes, Good dentition, No lesions  NECK: Supple, No JVD, Normal thyroid  NERVOUS SYSTEM:  Alert & Oriented X3, Good concentration; Motor Strength 5/5 B/L upper and lower extremities; DTRs 2+ intact and symmetric  CHEST/LUNG: Clear to percussion bilaterally; No rales, rhonchi, wheezing, or rubs  HEART: Regular rate and rhythm; No murmurs, rubs, or gallops  ABDOMEN: Soft, Nontender, Nondistended; Bowel sounds present  EXTREMITIES:  2+ Peripheral Pulses, No clubbing, cyanosis, or edema  LYMPH: No lymphadenopathy noted  SKIN: No rashes or lesions    Care Discussed with Consultants/Other Providers [ x] YES  [ ] NO

## 2020-11-29 NOTE — PROGRESS NOTE ADULT - SUBJECTIVE AND OBJECTIVE BOX
MARIBETH PADILLA    SCU progress note    INTERVAL HPI/OVERNIGHT EVENTS: ***No events overnight. Pt afebrile ~48hrs. BC in progress.      DNR [ x]   DNI  [  ]  TRIAL OF INTUBATION, trial feeding tube/ trial of IV fluids/abx    Covid - 19 PCR: not detected     The 4Ms    What Matters Most: see GOC  Age appropriate Medications/Screen for High Risk Medication: Yes  Mentation: see CAM below  Mobility: defer to physical exam    The Confusion Assessment Method (CAM) Diagnostic Algorithm     1: Acute Onset or Fluctuating Course  - Is there evidence of an acute change in mental status from the patient’s baseline? Did the (abnormal) behavior  fluctuate during the day, that is, tend to come and go, or increase and decrease in severity?  [ ] YES [ x] NO     2: Inattention  - Did the patient have difficulty focusing attention, being easily distractible, or having difficulty keeping track of what was being said?  [ ] YES [x ] NO     3: Disorganized thinking  -Was the patient’s thinking disorganized or incoherent, such as rambling or irrelevant conversation, unclear or illogical flow of ideas, or unpredictable switching from subject to subject?  [ ] YES [x ] NO    4: Altered Level of consciousness?  [ ] YES [ x] NO    The diagnosis of delirium by CAM requires the presence of features 1 and 2 and either 3 or 4.    PRESSORS: [ ] YES [ x] NO  DAPTOmycin IVPB 350 milliGRAM(s) IV Intermittent every 24 hours  fluconAZOLE IVPB 200 milliGRAM(s) IV Intermittent every 24 hours  fluconAZOLE IVPB      meropenem  IVPB 500 milliGRAM(s) IV Intermittent every 8 hours  rifAXIMin 550 milliGRAM(s) Oral two times a day    Cardiovascular:  Echo : PAVAN :  Normal Left Ventricular Systolic Function,  (EF= 55 to  60%)No obvious evidence of endocarditis    furosemide    Tablet 20 milliGRAM(s) Oral daily    Pulmonary:    Hematalogic:  enoxaparin Injectable 50 milliGRAM(s) SubCutaneous daily    Other:  acetaminophen   Tablet .. 650 milliGRAM(s) Oral every 6 hours PRN  ascorbic acid 500 milliGRAM(s) Oral two times a day  multivitamin/minerals 1 Tablet(s) Oral daily  nystatin Powder 1 Application(s) Topical two times a day  pantoprazole   Suspension 40 milliGRAM(s) Enteral Tube daily  zinc sulfate 220 milliGRAM(s) Oral daily    acetaminophen   Tablet .. 650 milliGRAM(s) Oral every 6 hours PRN  ascorbic acid 500 milliGRAM(s) Oral two times a day  DAPTOmycin IVPB 350 milliGRAM(s) IV Intermittent every 24 hours  enoxaparin Injectable 50 milliGRAM(s) SubCutaneous daily  fluconAZOLE IVPB 200 milliGRAM(s) IV Intermittent every 24 hours  fluconAZOLE IVPB      furosemide    Tablet 20 milliGRAM(s) Oral daily  meropenem  IVPB 500 milliGRAM(s) IV Intermittent every 8 hours  multivitamin/minerals 1 Tablet(s) Oral daily  nystatin Powder 1 Application(s) Topical two times a day  pantoprazole   Suspension 40 milliGRAM(s) Enteral Tube daily  rifAXIMin 550 milliGRAM(s) Oral two times a day  zinc sulfate 220 milliGRAM(s) Oral daily    Drug Dosing Weight  Height (cm): 167.6 (21 Oct 2020 02:26)  Weight (kg): 56.2 (21 Oct 2020 02:26)  BMI (kg/m2): 20 (21 Oct 2020 02:26)  BSA (m2): 1.63 (21 Oct 2020 02:26)    CENTRAL LINE: [ ] YES [x ] NO  LOCATION:   DATE INSERTED:  REMOVE: [ ] YES [ ] NO  EXPLAIN:    TREJO: [ ] YES [x ] NO    DATE INSERTED:  REMOVE:  [ ] YES [ ] NO  EXPLAIN:    PAST MEDICAL & SURGICAL HISTORY:  Anasarca    Pulmonary embolism    DVT (deep venous thrombosis)    SBO (small bowel obstruction)    H/O exploratory laparotomy                 @ 07:01  -   @ 07:00  --------------------------------------------------------  IN: 100 mL / OUT: 0 mL / NET: 100 mL            PHYSICAL EXAM:    GENERAL: cachectic,  NAD,   HEAD:  Atraumatic, Normocephalic  EYES: EOMI, PERRLA, conjunctiva and sclera clear  ENMT: No tonsillar erythema, exudates, or enlargement; Moist mucous membranes, Good dentition, No lesions  NECK: Supple, No JVD, Normal thyroid  NERVOUS SYSTEM: Awake  Alert & Oriented X3,  Motor Strength 5/5 B/L upper , 4/5 BLE.  CHEST/LUNG: Unlabored . Scattered rhonchi upper airwar.   HEART: Regular rate and rhythm; No murmurs, rubs, or gallops  ABDOMEN: Soft, Nontender, Nondistended; Bowel sounds present  EXTREMITIES:  2+ Peripheral Pulses, No clubbing, cyanosis, or edema  LYMPH: No lymphadenopathy noted  SKIN: Unstageable DTI to sacrum.       LABS:  CBC Full  -  ( 2020 06:46 )  WBC Count : 5.83 K/uL  RBC Count : 3.12 M/uL  Hemoglobin : 8.9 g/dL  Hematocrit : 29.4 %  Platelet Count - Automated : 285 K/uL  Mean Cell Volume : 94.2 fl  Mean Cell Hemoglobin : 28.5 pg  Mean Cell Hemoglobin Concentration : 30.3 gm/dL  Auto Neutrophil # : x  Auto Lymphocyte # : x  Auto Monocyte # : x  Auto Eosinophil # : x  Auto Basophil # : x  Auto Neutrophil % : x  Auto Lymphocyte % : x  Auto Monocyte % : x  Auto Eosinophil % : x  Auto Basophil % : x        147<H>  |  111<H>  |  19<H>  ----------------------------<  74  3.4<L>   |  30  |  0.86    Ca    8.1<L>      2020 06:46  Phos  3.4       Mg     1.5         TPro  6.5  /  Alb  2.0<L>  /  TBili  2.0<H>  /  DBili  x   /  AST  67<H>  /  ALT  48  /  AlkPhos  245<H>        Urinalysis Basic - ( 2020 18:35 )    Color: Yellow / Appearance: Clear / S.010 / pH: x  Gluc: x / Ketone: Negative  / Bili: Negative / Urobili: Negative   Blood: x / Protein: Negative / Nitrite: Negative   Leuk Esterase: Negative / RBC: 2-5 /HPF / WBC 0-2 /HPF   Sq Epi: x / Non Sq Epi: Few /HPF / Bacteria: Few /HPF            [  ]  DVT Prophylaxis  [  ]  Nutrition, Brand, Rate         Goal Rate        Abnormal Nutritional Status -  Malnutrition   Cachexia          RADIOLOGY & ADDITIONAL STUDIES:  ***    Goals of Care Discussion with Family/Proxy/Other   - see note from/family meeting :

## 2020-11-29 NOTE — PROGRESS NOTE ADULT - PROBLEM SELECTOR PLAN 1
Secondary to bacteremia . Bacteroides fragilis per BC on 10/13  Sputum positive for MRSA and Stenotrophomonas  Aspiration pneumonia vs HCAP .   Repeat BC 10/16 and 11/19 NGTD ,UC on 11/20 NGTD   Infected sacral ulcer- s/p debridement and culture grew Enterococcus (VRE) and Candida ( sensitivity is pending)  Leukocytosis resolved, Cdiff negative  MRI sacrum/pelvis shows OM   Echo and PAVAN negative for endocarditis  MRSA/Staph PCR (+)   c/w abx per ID   `PICC  for extended abx (for total of 6 weeks, thru Dec 28th). IR informed. PICC in IR likely Monday    Monitor CK weekly while on Dapto  F/u sputum culture    If spikes fever  then will change Daptomycin to Linezolid since Daptomycin does not cover pneumonia, get destroys in Lung with surfactant   Continue Meropenem to cover resistant organisms since became afebrile  Surgical follow up for further debridement of sacral wound   Continue Daptomycin to cover VRE  and  Fluconazole to cover Candida in sacral wound culture X 6  weeks to treat Osteomyelitis, might change to Linezolid for last two weeks if she is OFf midodrine     ID Dr. Patricio following

## 2020-11-29 NOTE — PROGRESS NOTE ADULT - SUBJECTIVE AND OBJECTIVE BOX
Patient is seen and examined at the bed side, is afebrile after changing to Meropenem.  The Procalcitonin level is elevated, 1.55.       REVIEW OF SYSTEMS: All other review systems are negative      ALLERGIES: No Known Allergies      Vital Signs Last 24 Hrs  T(C): 37.1 (29 Nov 2020 13:22), Max: 37.1 (28 Nov 2020 22:42)  T(F): 98.8 (29 Nov 2020 13:22), Max: 98.8 (29 Nov 2020 13:22)  HR: 97 (29 Nov 2020 13:22) (97 - 104)  BP: 116/77 (29 Nov 2020 05:20) (112/62 - 116/77)  BP(mean): --  RR: 18 (29 Nov 2020 13:22) (18 - 20)  SpO2: 100% (29 Nov 2020 13:22) (100% - 100%)      PHYSICAL EXAM:  GENERAL: Not in distress, on oxygen via NC  CHEST/LUNG: Not using accessory muscles   HEART: s1 and s2 present  ABDOMEN:  Nontender and  Nondistended  EXTREMITIES: B/L UE edematous improved  CNS: Awake and Alert         LABS:                        8.9    5.83  )-----------( 285      ( 29 Nov 2020 06:46 )             29.4              9.9    9.41  )-----------( 269      ( 24 Nov 2020 06:38 )             31.5                           9.4    13.14 )-----------( 149      ( 14 Nov 2020 07:58 )             28.7       11-29    147<H>  |  111<H>  |  19<H>  ----------------------------<  74  3.4<L>   |  30  |  0.86    Ca    8.1<L>      29 Nov 2020 06:46  Phos  3.4     11-29  Mg     1.5     11-29    TPro  6.5  /  Alb  2.0<L>  /  TBili  2.0<H>  /  DBili  x   /  AST  67<H>  /  ALT  48  /  AlkPhos  245<H>  11-29 11-28    149<H>  |  112<H>  |  18  ----------------------------<  72  3.7   |  29  |  0.87    Ca    8.3<L>      28 Nov 2020 06:51  Phos  3.6     11-28  Mg     1.6     11-28    TPro  6.5  /  Alb  2.0<L>  /  TBili  2.1<H>  /  DBili  x   /  AST  64<H>  /  ALT  51  /  AlkPhos  216<H>  11-28 11-06    138  |  104  |  37<H>  ----------------------------<  81  3.9   |  25  |  2.37<H>    Ca    8.1<L>      06 Nov 2020 06:20  Phos  3.4     11-06  Mg     2.0     11-06    TPro  5.1<L>  /  Alb  2.8<L>  /  TBili  9.2<H>  /  DBili  x   /  AST  233<H>  /  ALT  99<H>  /  AlkPhos  96  11-06      Vancomycin Level, Trough (11.07.20 @ 21:49)   Vancomycin Level, Trough: 16.1:     Vancomycin Level, Trough (11.07.20 @ 07:08)   Vancomycin Level, Trough: 19.5    Vancomycin Level, Trough (11.06.20 @ 06:20)   Vancomycin Level, Trough: 19.9:       Procalcitonin, Serum (11.28.20 @ 15:38)   Procalcitonin, Serum: 1.55:       MEDICATIONS  (STANDING):    ascorbic acid 500 milliGRAM(s) Oral two times a day  DAPTOmycin IVPB 350 milliGRAM(s) IV Intermittent every 24 hours  enoxaparin Injectable 50 milliGRAM(s) SubCutaneous daily  fluconAZOLE IVPB 200 milliGRAM(s) IV Intermittent every 24 hours  fluconAZOLE IVPB      furosemide    Tablet 20 milliGRAM(s) Oral daily  meropenem  IVPB 500 milliGRAM(s) IV Intermittent every 8 hours  multivitamin/minerals 1 Tablet(s) Oral daily  nystatin Powder 1 Application(s) Topical two times a day  pantoprazole   Suspension 40 milliGRAM(s) Enteral Tube daily  rifAXIMin 550 milliGRAM(s) Oral two times a day  zinc sulfate 220 milliGRAM(s) Oral daily        RADIOLOGY & ADDITIONAL TESTS:    11/27/20 : Xray Chest 1 View- PORTABLE-Urgent (Xray Chest 1 View- PORTABLE-Urgent .) (11.27.20 @ 06:53) Increased interstitial lung markings with bilateral consolidations right greater than left. Small right pleural effusion. Overall worsening compared to prior exam.      11/24/20 : MR Pelvis Bony Only No Cont (11.24.20 @ 18:02) : Osteomyelitis of the lower sacrum and coccyx with overlying cutaneous ulceration,      11/19/20 : Xray Chest 1 View- PORTABLE-Urgent (Xray Chest 1 View- PORTABLE-Urgent .) (11.19.20 @ 23:53): Improvement in bilateral airspace disease with persistent bibasilar consolidation and small effusions.    11/4/20 : Xray Chest 1 View- PORTABLE-Routine (Xray Chest 1 View- PORTABLE-Routine in AM.) (11.04.20 @ 10:59) Reexpansion of the left lung with residual left pleural effusion and/or infiltrate.  Right pulmonary edema unchanged.  Tubes and catheters in satisfactory position.      10/25/20: Xray Chest 1 View- PORTABLE-Routine (Xray Chest 1 View- PORTABLE-Routine in AM.) (10.25.20 @ 09:21) Frontal expiratory view of the chest shows the heart to be similar in size. Endotracheal tube, right jugular line and feeding tube remain present. The lungs show similar left upper lobe infiltrate with progression of right perihilar infiltrate. Pleural effusions are similar. There is no evidence of pneumothorax.    10/21/20 : CT Abdomen and Pelvis w/ Oral Cont and w/ IV Cont (10.21.20 @ 15:59) Mural thickening of the left colon and rectum. Liquid stool in the colon. Apparent segmental mural thickening of the mid to distal small bowel and the proximal duodenum. Findings may represent nonspecific enterocolitis, noninfectious inflammatory bowel disease, or ischemic bowel. Clinical correlation is recommended. The celiac axis artery, SMA, and KINA are patent without stenosis.    Dilatation of the mid small bowel is again noted. Oral contrast has reached the terminal ileum. Findings may represent small bowel obstruction, or ileus related to nonspecific enterocolitis.   Cholelithiasis. Moderate to large ascites in the abdomen, increased since the previous examination. 5 mm nonspecific noncalcified left upper lobe lung nodule; if the patient's is in the high risk category (i.e. smoker), follow-up chest CT may be pursued in 12 months to ensure stability. Combination of atelectasis and consolidation in the left lower lobe.  Possible 1.0 cm hypodense lesion in the left lobe of the thyroid. Thyroid ultrasound may be pursued for further evaluation.   Mild bilateral pleural effusions.  Aging determinate compression fracture at T5 vertebra.        MICROBIOLOGY DATA:    MRSA/MSSA PCR (11.28.20 @ 11:34)   MRSA PCR Result.: Detected:     Urine Microscopic-Add On (NC) (11.20.20 @ 04:12)   Red Blood Cell - Urine: 5-10 /HPF   White Blood Cell - Urine: 11-25 /HPF   Calcium Oxalate Crystals: Few /HPF   Bacteria: Moderate /HPF   Comment - Urine: few amorphous urates   Epithelial Cells: Few /HPF     Culture - Blood (11.19.20 @ 10:19)   Specimen Source: .Blood Blood-Peripheral   Culture Results: No growth to date.     Culture - Surgical Swab (11.12.20 @ 05:01)   - Ampicillin: R >8 Predicts results to ampicillin/sulbactam, amoxacillin-clavulanate and piperacillin-tazobactam.   - Levofloxacin: R >4   - Linezolid: S 1   - Tetra/Doxy: R >8   - Vancomycin: R >16   Specimen Source: .Surgical Swab Sacral decub   Culture Results:   Few Enterococcus faecium (vancomycin resistant)   Rare Candida albicans "Susceptibilities not performed"   Organism Identification: Enterococcus faecium (vancomycin resistant)   Organism: Enterococcus faecium (vancomycin resistant)   Method Type: STEWART     Culture - Surgical Swab (11.12.20 @ 05:01)   Specimen Source: .Surgical Swab Sacral decub   Culture Results:  Few Enterococcus faecium Susceptibility to follow.   Rare Candida albicans "Susceptibilities not performed"     Culture - Sputum . (10.26.20 @ 00:26)   - Ampicillin/Sulbactam: R <=8/4   - Cefazolin: R >16   - Ceftazidime: I 16   - Clindamycin: R >4   - Erythromycin: R >4   - Gentamicin: S <=1 Should not be used as monotherapy   - Levofloxacin: S <=0.5   - Linezolid: S 2   - Oxacillin: R >2   - Penicillin: R >8   - RIF- Rifampin: S <=1 Should not be used as monotherapy   - Tetra/Doxy: S <=1   - Trimethoprim/Sulfamethoxazole: S <=0.5/9.5   - Trimethoprim/Sulfamethoxazole: S <=0.5/9.5   - Vancomycin: S 1     MRSA/MSSA PCR (10.21.20 @ 09:41)   MRSA PCR Result.: Detected:       Culture - Blood (10.20.20 @ 22:09)   Specimen Source: .Blood Blood   Culture Results:  No growth to date.     Culture - Blood (10.20.20 @ 22:09)   Specimen Source: .Blood Blood   Culture Results:   No growth to date.     Culture - Blood in AM (10.16.20 @ 10:13)   Specimen Source: .Blood Blood-Peripheral   Culture Results: No growth to date.     Culture - Blood in AM (10.16.20 @ 10:13)   Specimen Source: .Blood Blood-Peripheral   Culture Results: No growth to date.     Culture - Blood (10.13.20 @ 22:23)   Growth in anaerobic bottle: Gram Negative Rods   Specimen Source: .Blood Blood-Peripheral   Organism: Blood Culture PCR   Culture Results: Growth in anaerobic bottle: Bacteroides fragilis   "Susceptibilities not performed"     Culture - Blood (10.13.20 @ 22:23)   Specimen Source: .Blood Blood-Peripheral   Culture Results:   No growth to date.              Patient is seen and examined at the bed side, remains afebrile X 48 hours. She is tolerating Meropenem well.      REVIEW OF SYSTEMS: All other review systems are negative      ALLERGIES: No Known Allergies      Vital Signs Last 24 Hrs  T(C): 37.1 (29 Nov 2020 13:22), Max: 37.1 (28 Nov 2020 22:42)  T(F): 98.8 (29 Nov 2020 13:22), Max: 98.8 (29 Nov 2020 13:22)  HR: 97 (29 Nov 2020 13:22) (97 - 104)  BP: 116/77 (29 Nov 2020 05:20) (112/62 - 116/77)  BP(mean): --  RR: 18 (29 Nov 2020 13:22) (18 - 20)  SpO2: 100% (29 Nov 2020 13:22) (100% - 100%)      PHYSICAL EXAM:  GENERAL: Not in distress, on oxygen via NC  CHEST/LUNG: Not using accessory muscles   HEART: s1 and s2 present  ABDOMEN:  Nontender and  Nondistended  EXTREMITIES: B/L UE edematous improved  CNS: Awake and Alert         LABS:                        8.9    5.83  )-----------( 285      ( 29 Nov 2020 06:46 )             29.4              9.9    9.41  )-----------( 269      ( 24 Nov 2020 06:38 )             31.5                           9.4    13.14 )-----------( 149      ( 14 Nov 2020 07:58 )             28.7       11-29    147<H>  |  111<H>  |  19<H>  ----------------------------<  74  3.4<L>   |  30  |  0.86    Ca    8.1<L>      29 Nov 2020 06:46  Phos  3.4     11-29  Mg     1.5     11-29    TPro  6.5  /  Alb  2.0<L>  /  TBili  2.0<H>  /  DBili  x   /  AST  67<H>  /  ALT  48  /  AlkPhos  245<H>  11-29 11-28    149<H>  |  112<H>  |  18  ----------------------------<  72  3.7   |  29  |  0.87    Ca    8.3<L>      28 Nov 2020 06:51  Phos  3.6     11-28  Mg     1.6     11-28    TPro  6.5  /  Alb  2.0<L>  /  TBili  2.1<H>  /  DBili  x   /  AST  64<H>  /  ALT  51  /  AlkPhos  216<H>  11-28 11-06    138  |  104  |  37<H>  ----------------------------<  81  3.9   |  25  |  2.37<H>    Ca    8.1<L>      06 Nov 2020 06:20  Phos  3.4     11-06  Mg     2.0     11-06    TPro  5.1<L>  /  Alb  2.8<L>  /  TBili  9.2<H>  /  DBili  x   /  AST  233<H>  /  ALT  99<H>  /  AlkPhos  96  11-06      Vancomycin Level, Trough (11.07.20 @ 21:49)   Vancomycin Level, Trough: 16.1:     Vancomycin Level, Trough (11.07.20 @ 07:08)   Vancomycin Level, Trough: 19.5    Vancomycin Level, Trough (11.06.20 @ 06:20)   Vancomycin Level, Trough: 19.9:       Procalcitonin, Serum (11.28.20 @ 15:38)   Procalcitonin, Serum: 1.55:       MEDICATIONS  (STANDING):    ascorbic acid 500 milliGRAM(s) Oral two times a day  DAPTOmycin IVPB 350 milliGRAM(s) IV Intermittent every 24 hours  enoxaparin Injectable 50 milliGRAM(s) SubCutaneous daily  fluconAZOLE IVPB 200 milliGRAM(s) IV Intermittent every 24 hours  fluconAZOLE IVPB      furosemide    Tablet 20 milliGRAM(s) Oral daily  meropenem  IVPB 500 milliGRAM(s) IV Intermittent every 8 hours  multivitamin/minerals 1 Tablet(s) Oral daily  nystatin Powder 1 Application(s) Topical two times a day  pantoprazole   Suspension 40 milliGRAM(s) Enteral Tube daily  rifAXIMin 550 milliGRAM(s) Oral two times a day  zinc sulfate 220 milliGRAM(s) Oral daily        RADIOLOGY & ADDITIONAL TESTS:    11/27/20 : Xray Chest 1 View- PORTABLE-Urgent (Xray Chest 1 View- PORTABLE-Urgent .) (11.27.20 @ 06:53) Increased interstitial lung markings with bilateral consolidations right greater than left. Small right pleural effusion. Overall worsening compared to prior exam.      11/24/20 : MR Pelvis Bony Only No Cont (11.24.20 @ 18:02) : Osteomyelitis of the lower sacrum and coccyx with overlying cutaneous ulceration,      11/19/20 : Xray Chest 1 View- PORTABLE-Urgent (Xray Chest 1 View- PORTABLE-Urgent .) (11.19.20 @ 23:53): Improvement in bilateral airspace disease with persistent bibasilar consolidation and small effusions.    11/4/20 : Xray Chest 1 View- PORTABLE-Routine (Xray Chest 1 View- PORTABLE-Routine in AM.) (11.04.20 @ 10:59) Reexpansion of the left lung with residual left pleural effusion and/or infiltrate.  Right pulmonary edema unchanged.  Tubes and catheters in satisfactory position.      10/25/20: Xray Chest 1 View- PORTABLE-Routine (Xray Chest 1 View- PORTABLE-Routine in AM.) (10.25.20 @ 09:21) Frontal expiratory view of the chest shows the heart to be similar in size. Endotracheal tube, right jugular line and feeding tube remain present. The lungs show similar left upper lobe infiltrate with progression of right perihilar infiltrate. Pleural effusions are similar. There is no evidence of pneumothorax.    10/21/20 : CT Abdomen and Pelvis w/ Oral Cont and w/ IV Cont (10.21.20 @ 15:59) Mural thickening of the left colon and rectum. Liquid stool in the colon. Apparent segmental mural thickening of the mid to distal small bowel and the proximal duodenum. Findings may represent nonspecific enterocolitis, noninfectious inflammatory bowel disease, or ischemic bowel. Clinical correlation is recommended. The celiac axis artery, SMA, and KINA are patent without stenosis.    Dilatation of the mid small bowel is again noted. Oral contrast has reached the terminal ileum. Findings may represent small bowel obstruction, or ileus related to nonspecific enterocolitis.   Cholelithiasis. Moderate to large ascites in the abdomen, increased since the previous examination. 5 mm nonspecific noncalcified left upper lobe lung nodule; if the patient's is in the high risk category (i.e. smoker), follow-up chest CT may be pursued in 12 months to ensure stability. Combination of atelectasis and consolidation in the left lower lobe.  Possible 1.0 cm hypodense lesion in the left lobe of the thyroid. Thyroid ultrasound may be pursued for further evaluation.   Mild bilateral pleural effusions.  Aging determinate compression fracture at T5 vertebra.        MICROBIOLOGY DATA:    MRSA/MSSA PCR (11.28.20 @ 11:34)   MRSA PCR Result.: Detected:     Urine Microscopic-Add On (NC) (11.20.20 @ 04:12)   Red Blood Cell - Urine: 5-10 /HPF   White Blood Cell - Urine: 11-25 /HPF   Calcium Oxalate Crystals: Few /HPF   Bacteria: Moderate /HPF   Comment - Urine: few amorphous urates   Epithelial Cells: Few /HPF     Culture - Blood (11.19.20 @ 10:19)   Specimen Source: .Blood Blood-Peripheral   Culture Results: No growth to date.     Culture - Surgical Swab (11.12.20 @ 05:01)   - Ampicillin: R >8 Predicts results to ampicillin/sulbactam, amoxacillin-clavulanate and piperacillin-tazobactam.   - Levofloxacin: R >4   - Linezolid: S 1   - Tetra/Doxy: R >8   - Vancomycin: R >16   Specimen Source: .Surgical Swab Sacral decub   Culture Results:   Few Enterococcus faecium (vancomycin resistant)   Rare Candida albicans "Susceptibilities not performed"   Organism Identification: Enterococcus faecium (vancomycin resistant)   Organism: Enterococcus faecium (vancomycin resistant)   Method Type: STEWART     Culture - Surgical Swab (11.12.20 @ 05:01)   Specimen Source: .Surgical Swab Sacral decub   Culture Results:  Few Enterococcus faecium Susceptibility to follow.   Rare Candida albicans "Susceptibilities not performed"     Culture - Sputum . (10.26.20 @ 00:26)   - Ampicillin/Sulbactam: R <=8/4   - Cefazolin: R >16   - Ceftazidime: I 16   - Clindamycin: R >4   - Erythromycin: R >4   - Gentamicin: S <=1 Should not be used as monotherapy   - Levofloxacin: S <=0.5   - Linezolid: S 2   - Oxacillin: R >2   - Penicillin: R >8   - RIF- Rifampin: S <=1 Should not be used as monotherapy   - Tetra/Doxy: S <=1   - Trimethoprim/Sulfamethoxazole: S <=0.5/9.5   - Trimethoprim/Sulfamethoxazole: S <=0.5/9.5   - Vancomycin: S 1     MRSA/MSSA PCR (10.21.20 @ 09:41)   MRSA PCR Result.: Detected:       Culture - Blood (10.20.20 @ 22:09)   Specimen Source: .Blood Blood   Culture Results:  No growth to date.     Culture - Blood (10.20.20 @ 22:09)   Specimen Source: .Blood Blood   Culture Results:   No growth to date.     Culture - Blood in AM (10.16.20 @ 10:13)   Specimen Source: .Blood Blood-Peripheral   Culture Results: No growth to date.     Culture - Blood in AM (10.16.20 @ 10:13)   Specimen Source: .Blood Blood-Peripheral   Culture Results: No growth to date.     Culture - Blood (10.13.20 @ 22:23)   Growth in anaerobic bottle: Gram Negative Rods   Specimen Source: .Blood Blood-Peripheral   Organism: Blood Culture PCR   Culture Results: Growth in anaerobic bottle: Bacteroides fragilis   "Susceptibilities not performed"     Culture - Blood (10.13.20 @ 22:23)   Specimen Source: .Blood Blood-Peripheral   Culture Results:   No growth to date.

## 2020-11-29 NOTE — PROGRESS NOTE ADULT - PROBLEM SELECTOR PLAN 5
Stage IV/unstageable  S/P sacral wound debridement  No active infection per Surgery `  MRI (+) OM  Continue fluconazole as per ID.   Dapto for total of 6 weeks with end date 12/28/2020 (11/17 - 12/28/2020)  PICC placement in IR , likely Monday. Hold Lovenox on day of PICC placement  Monitor CK while on Dapto   Continue wound care as ordered.  Turn every 2 hours.  OOB daily.  F/u Surg re: wound debridement

## 2020-11-30 NOTE — PROGRESS NOTE ADULT - PROBLEM SELECTOR PLAN 1
Secondary to bacteremia . Bacteroides fragilis per BC on 10/13  Sputum positive for MRSA and Stenotrophomonas  Aspiration pneumonia vs HCAP .   Repeat BC 10/16 and 11/19 NGTD ,UC on 11/20 NGTD   Infected sacral ulcer- s/p debridement and culture grew Enterococcus (VRE) and Candida ( sensitivity is pending)  Leukocytosis resolved, Cdiff negative  MRI sacrum/pelvis shows OM   c/w abx per ID   `PICC  for extended abx (for total of 6 weeks, thru Dec 28th). Placed today in IR 39 y/o F pt with PMHx of UTI, presents to ED c/o suprapubic abd pain that radiates to her R lower back x tonight. Pt endorses nausea. Pt denies fever, chills, vomiting, diarrhea, dysuria, urinary frequency, hematuria, SOB, cough, CP, palpitations, or any other complaints. NKDA. 37 y/o F pt with PMHx of UTI, presents to ED c/o suprapubic abd pain that radiates to her R and L lower back x tonight. Pt endorses nausea. She is due for her period now. Pt denies fever, chills, vomiting, diarrhea, dysuria, urinary frequency, hematuria, SOB, cough, CP, palpitations, or any other complaints. NKDA.

## 2020-11-30 NOTE — PROGRESS NOTE ADULT - SUBJECTIVE AND OBJECTIVE BOX
Patient is seen and examined at the bed side, remains afebrile X 48 hours. She is s/p placement PICC line today, tolerated the procedure well. The kidney function remains normal.        REVIEW OF SYSTEMS: All other review systems are negative      ALLERGIES: No Known Allergies        Vital Signs Last 24 Hrs  T(C): 36.7 (30 Nov 2020 13:24), Max: 36.7 (29 Nov 2020 20:26)  T(F): 98 (30 Nov 2020 13:24), Max: 98.1 (29 Nov 2020 20:26)  HR: 100 (30 Nov 2020 13:24) (100 - 108)  BP: 118/75 (30 Nov 2020 13:24) (116/71 - 119/75)  BP(mean): --  RR: 20 (30 Nov 2020 13:24) (16 - 20)  SpO2: 100% (30 Nov 2020 13:24) (100% - 100%)        PHYSICAL EXAM:  GENERAL: Not in distress, on oxygen via NC  CHEST/LUNG: Not using accessory muscles   HEART: s1 and s2 present  ABDOMEN:  Nontender and  Nondistended  EXTREMITIES: B/L UE edematous improved  CNS: Awake and Alert         LABS:                        8.2    6.12  )-----------( 288      ( 30 Nov 2020 05:53 )             28.2                           9.9    9.41  )-----------( 269      ( 24 Nov 2020 06:38 )             31.5                           9.4    13.14 )-----------( 149      ( 14 Nov 2020 07:58 )             28.7       11-30    147<H>  |  112<H>  |  18  ----------------------------<  83  3.8   |  33<H>  |  0.78    Ca    7.8<L>      30 Nov 2020 05:53  Phos  3.8     11-30  Mg     1.6     11-30    TPro  6.4  /  Alb  2.0<L>  /  TBili  1.8<H>  /  DBili  x   /  AST  72<H>  /  ALT  47  /  AlkPhos  271<H>  11-30 11-29    147<H>  |  111<H>  |  19<H>  ----------------------------<  74  3.4<L>   |  30  |  0.86    Ca    8.1<L>      29 Nov 2020 06:46  Phos  3.4     11-29  Mg     1.5     11-29    TPro  6.5  /  Alb  2.0<L>  /  TBili  2.0<H>  /  DBili  x   /  AST  67<H>  /  ALT  48  /  AlkPhos  245<H>  11-29 11-06    138  |  104  |  37<H>  ----------------------------<  81  3.9   |  25  |  2.37<H>    Ca    8.1<L>      06 Nov 2020 06:20  Phos  3.4     11-06  Mg     2.0     11-06    TPro  5.1<L>  /  Alb  2.8<L>  /  TBili  9.2<H>  /  DBili  x   /  AST  233<H>  /  ALT  99<H>  /  AlkPhos  96  11-06      Vancomycin Level, Trough (11.07.20 @ 21:49)   Vancomycin Level, Trough: 16.1:     Vancomycin Level, Trough (11.07.20 @ 07:08)   Vancomycin Level, Trough: 19.5    Vancomycin Level, Trough (11.06.20 @ 06:20)   Vancomycin Level, Trough: 19.9:       Procalcitonin, Serum (11.28.20 @ 15:38)   Procalcitonin, Serum: 1.55:       MEDICATIONS  (STANDING):    ascorbic acid 500 milliGRAM(s) Oral two times a day  chlorhexidine 2% Cloths 1 Application(s) Topical daily  DAPTOmycin IVPB 350 milliGRAM(s) IV Intermittent every 24 hours  enoxaparin Injectable 50 milliGRAM(s) SubCutaneous daily  fluconAZOLE IVPB 200 milliGRAM(s) IV Intermittent every 24 hours  fluconAZOLE IVPB      furosemide    Tablet 20 milliGRAM(s) Oral daily  meropenem  IVPB 500 milliGRAM(s) IV Intermittent every 8 hours  multivitamin/minerals 1 Tablet(s) Oral daily  mupirocin 2% Nasal 1 Application(s) Nasal two times a day  nystatin Powder 1 Application(s) Topical two times a day  pantoprazole   Suspension 40 milliGRAM(s) Enteral Tube daily  rifAXIMin 550 milliGRAM(s) Oral two times a day  zinc sulfate 220 milliGRAM(s) Oral daily          RADIOLOGY & ADDITIONAL TESTS:    11/27/20 : Xray Chest 1 View- PORTABLE-Urgent (Xray Chest 1 View- PORTABLE-Urgent .) (11.27.20 @ 06:53) Increased interstitial lung markings with bilateral consolidations right greater than left. Small right pleural effusion. Overall worsening compared to prior exam.      11/24/20 : MR Pelvis Bony Only No Cont (11.24.20 @ 18:02) : Osteomyelitis of the lower sacrum and coccyx with overlying cutaneous ulceration,      11/19/20 : Xray Chest 1 View- PORTABLE-Urgent (Xray Chest 1 View- PORTABLE-Urgent .) (11.19.20 @ 23:53): Improvement in bilateral airspace disease with persistent bibasilar consolidation and small effusions.    11/4/20 : Xray Chest 1 View- PORTABLE-Routine (Xray Chest 1 View- PORTABLE-Routine in AM.) (11.04.20 @ 10:59) Reexpansion of the left lung with residual left pleural effusion and/or infiltrate.  Right pulmonary edema unchanged.  Tubes and catheters in satisfactory position.      10/25/20: Xray Chest 1 View- PORTABLE-Routine (Xray Chest 1 View- PORTABLE-Routine in AM.) (10.25.20 @ 09:21) Frontal expiratory view of the chest shows the heart to be similar in size. Endotracheal tube, right jugular line and feeding tube remain present. The lungs show similar left upper lobe infiltrate with progression of right perihilar infiltrate. Pleural effusions are similar. There is no evidence of pneumothorax.    10/21/20 : CT Abdomen and Pelvis w/ Oral Cont and w/ IV Cont (10.21.20 @ 15:59) Mural thickening of the left colon and rectum. Liquid stool in the colon. Apparent segmental mural thickening of the mid to distal small bowel and the proximal duodenum. Findings may represent nonspecific enterocolitis, noninfectious inflammatory bowel disease, or ischemic bowel. Clinical correlation is recommended. The celiac axis artery, SMA, and KINA are patent without stenosis.    Dilatation of the mid small bowel is again noted. Oral contrast has reached the terminal ileum. Findings may represent small bowel obstruction, or ileus related to nonspecific enterocolitis.   Cholelithiasis. Moderate to large ascites in the abdomen, increased since the previous examination. 5 mm nonspecific noncalcified left upper lobe lung nodule; if the patient's is in the high risk category (i.e. smoker), follow-up chest CT may be pursued in 12 months to ensure stability. Combination of atelectasis and consolidation in the left lower lobe.  Possible 1.0 cm hypodense lesion in the left lobe of the thyroid. Thyroid ultrasound may be pursued for further evaluation.   Mild bilateral pleural effusions.  Aging determinate compression fracture at T5 vertebra.        MICROBIOLOGY DATA:    MRSA/MSSA PCR (11.28.20 @ 11:34)   MRSA PCR Result.: Detected:     Urine Microscopic-Add On (NC) (11.20.20 @ 04:12)   Red Blood Cell - Urine: 5-10 /HPF   White Blood Cell - Urine: 11-25 /HPF   Calcium Oxalate Crystals: Few /HPF   Bacteria: Moderate /HPF   Comment - Urine: few amorphous urates   Epithelial Cells: Few /HPF     Culture - Blood (11.19.20 @ 10:19)   Specimen Source: .Blood Blood-Peripheral   Culture Results: No growth to date.     Culture - Surgical Swab (11.12.20 @ 05:01)   - Ampicillin: R >8 Predicts results to ampicillin/sulbactam, amoxacillin-clavulanate and piperacillin-tazobactam.   - Levofloxacin: R >4   - Linezolid: S 1   - Tetra/Doxy: R >8   - Vancomycin: R >16   Specimen Source: .Surgical Swab Sacral decub   Culture Results:   Few Enterococcus faecium (vancomycin resistant)   Rare Candida albicans "Susceptibilities not performed"   Organism Identification: Enterococcus faecium (vancomycin resistant)   Organism: Enterococcus faecium (vancomycin resistant)   Method Type: STEWART     Culture - Surgical Swab (11.12.20 @ 05:01)   Specimen Source: .Surgical Swab Sacral decub   Culture Results:  Few Enterococcus faecium Susceptibility to follow.   Rare Candida albicans "Susceptibilities not performed"     Culture - Sputum . (10.26.20 @ 00:26)   - Ampicillin/Sulbactam: R <=8/4   - Cefazolin: R >16   - Ceftazidime: I 16   - Clindamycin: R >4   - Erythromycin: R >4   - Gentamicin: S <=1 Should not be used as monotherapy   - Levofloxacin: S <=0.5   - Linezolid: S 2   - Oxacillin: R >2   - Penicillin: R >8   - RIF- Rifampin: S <=1 Should not be used as monotherapy   - Tetra/Doxy: S <=1   - Trimethoprim/Sulfamethoxazole: S <=0.5/9.5   - Trimethoprim/Sulfamethoxazole: S <=0.5/9.5   - Vancomycin: S 1     MRSA/MSSA PCR (10.21.20 @ 09:41)   MRSA PCR Result.: Detected:       Culture - Blood (10.20.20 @ 22:09)   Specimen Source: .Blood Blood   Culture Results:  No growth to date.     Culture - Blood (10.20.20 @ 22:09)   Specimen Source: .Blood Blood   Culture Results:   No growth to date.     Culture - Blood in AM (10.16.20 @ 10:13)   Specimen Source: .Blood Blood-Peripheral   Culture Results: No growth to date.     Culture - Blood in AM (10.16.20 @ 10:13)   Specimen Source: .Blood Blood-Peripheral   Culture Results: No growth to date.     Culture - Blood (10.13.20 @ 22:23)   Growth in anaerobic bottle: Gram Negative Rods   Specimen Source: .Blood Blood-Peripheral   Organism: Blood Culture PCR   Culture Results: Growth in anaerobic bottle: Bacteroides fragilis   "Susceptibilities not performed"     Culture - Blood (10.13.20 @ 22:23)   Specimen Source: .Blood Blood-Peripheral   Culture Results:   No growth to date.

## 2020-11-30 NOTE — PROGRESS NOTE ADULT - ASSESSMENT
NEEDS SURGICAL EVAL     65y Female from home, lives with daughter, ambulates with walker with PMH of recurrent SBO's, s/p exp lap, SB resection in 2015, ex lap, ALBINA in 2018, DVT, PE, on Xarelto, IVC filter, chronic leg swelling, and anasarca, came in to the ED due to hypotension during office visit. Patient was found to be bacteremic with bacteroids fragilis. Admitted in ICU due to hypotension and hypothermia. patient completed course of antibiotics . BCx X2 negative. Ammonia level elevated and patient refused NGT placement, mental status deteriorated and patient desaturated to high 70%, patient got intubated on 10/24 . Sputum was positive for MRSA and Stenotrophomonas. patient started on vancomycin and Levaquin. patient was given lactulose for high ammonia level. kidney function and liver function started to deteriorate and patient was leaning toward multi organ failure . high dose of Lasix started and patient was aggressively diuresed. Kidney function improved . Ammonia level dropped to less than 10.  Mental status improved and patient extubated on 11/3/2020. Patient was seen by wound specialist and decubitus ulcer debrided. patient had a drop in h/h s/p debridement . received 1 unit PRBC .     11/12  Transferred from ICU to SCU  `11/17  Midodrine d/c secondary to daptomycin being started.  11/18 Febrile, Tmax 101.5. Repeat BC sent.   11/19 Discontinue velazquez.   11/20 febrile overnight.   11/22 s/p 2 units PRBC for Hgb 6.4  11/24 MRI pelvis (+) OM  11/25 PAVAN no endocarditis. Awaiting PICC  11/27 Spiked fever of 101F; Repeated cultures, chest xray, lactate, Follow up results. IR Denies PICC Placement due to fever. Plan for IR Monday (NPO Sunday) if afebrile over the weekend. Given one dose of lasix IV today for pleural effusion on chest xray; Give one more dose of IV Lasix 40mg tomorrow at 2pm if BP stable. Continue with daily lasix.

## 2020-11-30 NOTE — PROGRESS NOTE ADULT - ASSESSMENT
64 yo Female with Hx of recurrent SBO, s/p SB resection in 2015, s/p lysis of adhesions in 2018, Hx of DVT, PE (was on Xarelto), IVC filter was admitted with sepsis, found to have Bacteroides fragilis bacteremia, transferred to ICU b/o hypotension, hypothermia, was on pressors, intubated, developed HCAP/aspirational PNA, sputum growing MRSA and Stenotrophomonas, MOF with EF 10-15%, encephalopathy, worsening liver tests; successfully treated with antibiotics / HF mgmt., now extubated with improved mental status, and improved liver tests, but with infected sacral decubiti, being treated for osteomyelitis, on daptomycin and fluconazole.      - Possibly the combination of HF, sepsis, significantly improved, but still with hyperbilirubinemia (1.8), AST 72, ALT 47, , without encephalopathy, cannot rule out indeed underlying vascular condition or other etiology (so far KATHY neg, consider sending out rest of CLD, including AI workup), however still ongoing acute issues and antibiotics/antifungal can constribute  - Repeat US w Doppler in ICU was not successful now with patient improvement, can attempt again to visualize HV  - Prior admission patient was referred to [facility] for workup for possible HV thrombosis, patient can follow there upon discharge as previously planned, but now still being treated for decubiti / OM  - Had EF 10-15%, repeat echo shows improvement 55-60%.    Will continue to follow  D/w primary team  Thank you for consult

## 2020-11-30 NOTE — PROGRESS NOTE ADULT - SUBJECTIVE AND OBJECTIVE BOX
Patient was seen and examined  Patient is a 65y old  Female who presents with a chief complaint of Hypotension (29 Nov 2020 15:48)      INTERVAL HPI/OVERNIGHT EVENTS:  T(C): 36.6 (11-30-20 @ 05:15), Max: 37.1 (11-29-20 @ 13:22)  HR: 108 (11-30-20 @ 05:15) (97 - 108)  BP: 119/75 (11-30-20 @ 05:15) (116/71 - 119/75)  RR: 16 (11-30-20 @ 05:15) (16 - 18)  SpO2: 100% (11-30-20 @ 05:15) (100% - 100%)  Wt(kg): --  I&O's Summary      LABS:                        8.2    6.12  )-----------( 288      ( 30 Nov 2020 05:53 )             28.2     11-30    147<H>  |  112<H>  |  18  ----------------------------<  83  3.8   |  33<H>  |  0.78    Ca    7.8<L>      30 Nov 2020 05:53  Phos  3.8     11-30  Mg     1.6     11-30    TPro  6.4  /  Alb  2.0<L>  /  TBili  1.8<H>  /  DBili  x   /  AST  72<H>  /  ALT  47  /  AlkPhos  271<H>  11-30        CAPILLARY BLOOD GLUCOSE      POCT Blood Glucose.: 84 mg/dL (30 Nov 2020 06:06)  POCT Blood Glucose.: 84 mg/dL (29 Nov 2020 23:58)              MEDICATIONS  (STANDING):  ascorbic acid 500 milliGRAM(s) Oral two times a day  chlorhexidine 2% Cloths 1 Application(s) Topical daily  DAPTOmycin IVPB 350 milliGRAM(s) IV Intermittent every 24 hours  enoxaparin Injectable 50 milliGRAM(s) SubCutaneous daily  fluconAZOLE IVPB 200 milliGRAM(s) IV Intermittent every 24 hours  fluconAZOLE IVPB      furosemide    Tablet 20 milliGRAM(s) Oral daily  meropenem  IVPB 500 milliGRAM(s) IV Intermittent every 8 hours  multivitamin/minerals 1 Tablet(s) Oral daily  mupirocin 2% Nasal 1 Application(s) Nasal two times a day  nystatin Powder 1 Application(s) Topical two times a day  pantoprazole   Suspension 40 milliGRAM(s) Enteral Tube daily  rifAXIMin 550 milliGRAM(s) Oral two times a day  zinc sulfate 220 milliGRAM(s) Oral daily    MEDICATIONS  (PRN):  acetaminophen   Tablet .. 650 milliGRAM(s) Oral every 6 hours PRN Temp greater or equal to 38C (100.4F)  ALBUTerol    90 MICROgram(s) HFA Inhaler 2 Puff(s) Inhalation every 6 hours PRN Shortness of Breath and/or Wheezing      RADIOLOGY & ADDITIONAL TESTS:    Imaging Personally Reviewed:  [ ] YES  [ ] NO    REVIEW OF SYSTEMS:  CONSTITUTIONAL: No fever, weight loss, or fatigue  EYES: No eye pain, visual disturbances, or discharge  ENMT:  No difficulty hearing, tinnitus, vertigo; No sinus or throat pain  NECK: No pain or stiffness  BREASTS: No pain, masses, or nipple discharge  RESPIRATORY: No cough, wheezing, chills or hemoptysis; No shortness of breath  CARDIOVASCULAR: No chest pain, palpitations, dizziness, or leg swelling  GASTROINTESTINAL: No abdominal or epigastric pain. No nausea, vomiting, or hematemesis; No diarrhea or constipation. No melena or hematochezia.  GENITOURINARY: No dysuria, frequency, hematuria, or incontinence  NEUROLOGICAL: No headaches, memory loss, loss of strength, numbness, or tremors  SKIN: No itching, burning, rashes, or lesions   LYMPH NODES: No enlarged glands  ENDOCRINE: No heat or cold intolerance; No hair loss  MUSCULOSKELETAL: No joint pain or swelling; No muscle, back, or extremity pain  PSYCHIATRIC: No depression, anxiety, mood swings, or difficulty sleeping  HEME/LYMPH: No easy bruising, or bleeding gums  ALLERY AND IMMUNOLOGIC: No hives or eczema      Consultant(s) Notes Reviewed:  [ ] YES  [ ] NO    PHYSICAL EXAM:  GENERAL: NAD, well-groomed, well-developed  HEAD:  Atraumatic, Normocephalic  EYES: EOMI, PERRLA, conjunctiva and sclera clear  ENMT: No tonsillar erythema, exudates, or enlargement; Moist mucous membranes, Good dentition, No lesions  NECK: Supple, No JVD, Normal thyroid  NERVOUS SYSTEM:  Alert & Oriented X3, Good concentration; Motor Strength 5/5 B/L upper and lower extremities; DTRs 2+ intact and symmetric  CHEST/LUNG: Clear to percussion bilaterally; No rales, rhonchi, wheezing, or rubs  HEART: Regular rate and rhythm; No murmurs, rubs, or gallops  ABDOMEN: Soft, Nontender, Nondistended; Bowel sounds present  EXTREMITIES:  2+ Peripheral Pulses, No clubbing, cyanosis, or edema  LYMPH: No lymphadenopathy noted  SKIN: sacral decubiti   Care Discussed with Consultants/Other Providers [ x] YES  [ ] NO

## 2020-11-30 NOTE — PROGRESS NOTE ADULT - ASSESSMENT
Patient is a 65y old  Female from home, lives with daughter, ambulates with walker with PMH of recurrent SBO's, s/p exp lap, SB resection in 2015, ex lap, ALBINA in 2018, DVT, PE, on Xarelto, IVC filter, chronic leg swelling, and anasarca, Now send in to the ER by her PCP, Dr. Ross, for evaluation of  generalized weakness and hypotension BP of 80/40 during office visit. On admission, she found to have no fever and BP was in lower normal range and no Leukocytosis. The CXR is clear and CT abd/pelvis consistent with Ileus. The ID consult requested to assist with evaluation of infectious etiology of episode of hypotension. Found to have Bacteroides bacteremia and now developed septic shock on Cefepime and Flagyl. Hence transferred to ICU. 10/20/20.    # Hypotension  at office- BP of 80/40 - resolved   #  Bacteroides fragilis Bacteremia - 10/13/20 - ? source most likely GI- Repeat Blood Cxs have NGTD 10/16/20  # Ileus  - h/o recurrent SBO and s/p EXp. LAp  # COVID 19 negative   # Septic shock ( Hypothermia + hypotensive)- transferred to ICU since requiring pressor- source GI, The CT abd/pelvis shows nonspecific enterocolitis, noninfectious inflammatory bowel disease, or ischemic bowel, along with ileus.   # Pneumonia- HCAP vs Aspiration - on CXR 10/24 and CT chest 10/21- sputum Cx grew Methicillin resistant Staphylococcus aureus  and Stenotrophomonas maltophilia.  # S/p extubated 11/9/20  # Infected sacral ulcer- s/p debridement and culture grew Enterococcus, VRE and Candida, sensitivity is pending- The MRI of pelvis shows  Osteomyelitis of the lower sacrum and coccyx with overlying cutaneous ulceration.   # Fever- 11/8 and 11/19- BCX NGTD 11/19 - The CXR shows Improvement in bilateral airspace disease with persistent bibasilar consolidation. She is s/p removal of velazquez catheter and passed TOV.  # HCAP- 11/27/20,  Intermittent fever and CXR shows  bilateral consolidations right greater than left. - The procalcitonin level is 1.55  # s/p PICC line placement 11/30      would recommend:    1.  Surgical follow up for further debridement of sacral wound   2.  if spikes fever  then will change Daptomycin to Linezolid since Daptomycin does not cover pneumonia, get destroys in Lung with surfactant  3. Continue Meropenem X 7 days in total   4. Continue Daptomycin to cover VRE  and  Fluconazole to cover Candida in sacral wound culture X 6  weeks to treat Osteomyelitis, might change to Linezolid for last two weeks if she is OFf midodrine   5. Aspiration precaution  6. Frequent repositioning     d/w Covering NPHeathia     Attending Attestation:    Spent more than 45 minutes on total encounter, more than 50 % of the visit was spent counseling and/or coordinating care by the Attending physician.

## 2020-11-30 NOTE — PROGRESS NOTE ADULT - PROBLEM SELECTOR PLAN 9
DVT and GI prophylaxis  Continue rifaximin  Dapto for total of 6 weeks with end date 12/28/2020 (11/17 - 12/28/2020  plan for PICC possibly Monday. Hold lovenox on Monday   Discharge planning likely to [facility] when medically optimized.

## 2020-11-30 NOTE — PROGRESS NOTE ADULT - SUBJECTIVE AND OBJECTIVE BOX
condition same  alert, feels ok  had 101 temp 3 days ago  ROS:  Negative except for:    MEDICATIONS  (STANDING):  ascorbic acid 500 milliGRAM(s) Oral two times a day  chlorhexidine 2% Cloths 1 Application(s) Topical daily  DAPTOmycin IVPB 350 milliGRAM(s) IV Intermittent every 24 hours  enoxaparin Injectable 50 milliGRAM(s) SubCutaneous daily  fluconAZOLE IVPB 200 milliGRAM(s) IV Intermittent every 24 hours  fluconAZOLE IVPB      furosemide    Tablet 20 milliGRAM(s) Oral daily  meropenem  IVPB 500 milliGRAM(s) IV Intermittent every 8 hours  multivitamin/minerals 1 Tablet(s) Oral daily  mupirocin 2% Nasal 1 Application(s) Nasal two times a day  nystatin Powder 1 Application(s) Topical two times a day  pantoprazole   Suspension 40 milliGRAM(s) Enteral Tube daily  rifAXIMin 550 milliGRAM(s) Oral two times a day  zinc sulfate 220 milliGRAM(s) Oral daily    MEDICATIONS  (PRN):  acetaminophen   Tablet .. 650 milliGRAM(s) Oral every 6 hours PRN Temp greater or equal to 38C (100.4F)  ALBUTerol    90 MICROgram(s) HFA Inhaler 2 Puff(s) Inhalation every 6 hours PRN Shortness of Breath and/or Wheezing  sodium chloride 0.9% lock flush 10 milliLiter(s) IV Push every 1 hour PRN Pre/post blood products, medications, blood draw, and to maintain line patency      Allergies    No Known Allergies    Intolerances        Vital Signs Last 24 Hrs  T(C): 36.9 (30 Nov 2020 20:58), Max: 36.9 (30 Nov 2020 20:58)  T(F): 98.5 (30 Nov 2020 20:58), Max: 98.5 (30 Nov 2020 20:58)  HR: 104 (30 Nov 2020 20:58) (100 - 108)  BP: 111/72 (30 Nov 2020 20:58) (111/72 - 119/75)  BP(mean): --  RR: 19 (30 Nov 2020 20:58) (16 - 20)  SpO2: 100% (30 Nov 2020 20:58) (100% - 100%)    PHYSICAL EXAM  General: adult in NAD  HEENT: clear oropharynx, anicteric sclera, pink conjunctiva  Neck: supple  CV: normal S1/S2 with no murmur rubs or gallops  Lungs: positive air movement b/l ant lungs,clear to auscultation, no wheezes, no rales  Abdomen: soft non-tender non-distended, no hepatosplenomegaly  Ext: no clubbing cyanosis or edema  Skin: no rashes and no petechiae  Neuro: alert and oriented X 4, no focal deficits  LABS:                          8.2    6.12  )-----------( 288      ( 30 Nov 2020 05:53 )             28.2         Mean Cell Volume : 96.6 fl  Mean Cell Hemoglobin : 28.1 pg  Mean Cell Hemoglobin Concentration : 29.1 gm/dL  Auto Neutrophil # : x  Auto Lymphocyte # : x  Auto Monocyte # : x  Auto Eosinophil # : x  Auto Basophil # : x  Auto Neutrophil % : x  Auto Lymphocyte % : x  Auto Monocyte % : x  Auto Eosinophil % : x  Auto Basophil % : x    Serial CBC  Hematocrit 28.2  Hemoglobin 8.2  Plat 288  RBC 2.92  WBC 6.12  Serial CBC  Hematocrit 29.4  Hemoglobin 8.9  Plat 285  RBC 3.12  WBC 5.83  Serial CBC  Hematocrit 29.6  Hemoglobin 8.9  Plat 279  RBC 3.16  WBC 6.79  Serial CBC  Hematocrit 29.5  Hemoglobin 9.0  Plat 283  RBC 3.12  WBC 7.76    11-30    147<H>  |  112<H>  |  18  ----------------------------<  83  3.8   |  33<H>  |  0.78    Ca    7.8<L>      30 Nov 2020 05:53  Phos  3.8     11-30  Mg     1.6     11-30    TPro  6.4  /  Alb  2.0<L>  /  TBili  1.8<H>  /  DBili  x   /  AST  72<H>  /  ALT  47  /  AlkPhos  271<H>  11-30                    BLOOD SMEAR INTERPRETATION:       RADIOLOGY & ADDITIONAL STUDIES:

## 2020-11-30 NOTE — PROGRESS NOTE ADULT - PROBLEM SELECTOR PLAN 5
Multifactorial: Multiple organ failure, Elevated LFTS, Elevated amnionia level and baseline dementia.  Improving

## 2020-11-30 NOTE — PROGRESS NOTE ADULT - SUBJECTIVE AND OBJECTIVE BOX
Chief Complaint:  Patient is a 65y old  Female who presents with a chief complaint of Hypotension (30 Nov 2020 09:39)      Reason for consult: r/o [place]    Interval Events: Patient was seen and examined at bedside. AAOx3.  Being treated for osteomyelitis. Liver enzymes improved but mildly elevated, mostly cholestatic, but still with ongoing infection and on antibiotic , antifungal.      Hospital Medications:  acetaminophen   Tablet .. 650 milliGRAM(s) Oral every 6 hours PRN  ALBUTerol    90 MICROgram(s) HFA Inhaler 2 Puff(s) Inhalation every 6 hours PRN  ascorbic acid 500 milliGRAM(s) Oral two times a day  chlorhexidine 2% Cloths 1 Application(s) Topical daily  DAPTOmycin IVPB 350 milliGRAM(s) IV Intermittent every 24 hours  enoxaparin Injectable 50 milliGRAM(s) SubCutaneous daily  fluconAZOLE IVPB 200 milliGRAM(s) IV Intermittent every 24 hours  fluconAZOLE IVPB      furosemide    Tablet 20 milliGRAM(s) Oral daily  meropenem  IVPB 500 milliGRAM(s) IV Intermittent every 8 hours  multivitamin/minerals 1 Tablet(s) Oral daily  mupirocin 2% Nasal 1 Application(s) Nasal two times a day  nystatin Powder 1 Application(s) Topical two times a day  pantoprazole   Suspension 40 milliGRAM(s) Enteral Tube daily  rifAXIMin 550 milliGRAM(s) Oral two times a day  sodium chloride 0.9% lock flush 10 milliLiter(s) IV Push every 1 hour PRN  zinc sulfate 220 milliGRAM(s) Oral daily      ROS:   General:  No  fevers, chills, night sweats, fatigue  Eyes:  Good vision, no reported pain  ENT:  No sore throat, pain, runny nose  CV:  No pain, palpitations  Pulm:  No dyspnea, cough  GI:  No abdominal pain, N/V, tolerates pureed diet, no diarrhea, melena or blood in stool reportedly  :  No dysuria  Muscle:  Generalized weakness  Neuro:  AAOx3    PHYSICAL EXAM:   Vital Signs:  Vital Signs Last 24 Hrs  T(C): 36.7 (30 Nov 2020 13:24), Max: 36.7 (29 Nov 2020 20:26)  T(F): 98 (30 Nov 2020 13:24), Max: 98.1 (29 Nov 2020 20:26)  HR: 100 (30 Nov 2020 13:24) (100 - 108)  BP: 118/75 (30 Nov 2020 13:24) (116/71 - 119/75)  BP(mean): --  RR: 20 (30 Nov 2020 13:24) (16 - 20)  SpO2: 100% (30 Nov 2020 13:24) (100% - 100%)  Daily     Daily     GENERAL: no acute distress  NEURO: awake, alert, oriented x3, no asterixis  HEENT: anicteric sclera, no conjunctival pallor appreciated  CHEST: no respiratory distress, no accessory muscle use  CARDIAC: regular rate, rhythm  ABDOMEN: soft, non-tender, non-distended, no rebound or guarding, BS+  EXTREMITIES: warm, well perfused, no edema      LABS: reviewed                        8.2    6.12  )-----------( 288      ( 30 Nov 2020 05:53 )             28.2     11-30    147<H>  |  112<H>  |  18  ----------------------------<  83  3.8   |  33<H>  |  0.78    Ca    7.8<L>      30 Nov 2020 05:53  Phos  3.8     11-30  Mg     1.6     11-30    TPro  6.4  /  Alb  2.0<L>  /  TBili  1.8<H>  /  DBili  x   /  AST  72<H>  /  ALT  47  /  AlkPhos  271<H>  11-30    LIVER FUNCTIONS - ( 30 Nov 2020 05:53 )  Alb: 2.0 g/dL / Pro: 6.4 g/dL / ALK PHOS: 271 U/L / ALT: 47 U/L DA / AST: 72 U/L / GGT: x             Interval Diagnostic Studies: see sunrise for full report

## 2020-12-01 NOTE — CHART NOTE - NSCHARTNOTEFT_GEN_A_CORE
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 Very tiny area of eschar but wound otherwise clean.  No evidence of purulent drainage.  No active infection. No signs of active bleeding   Sacral decubitus not source of fevers.     Reconsult as needed.

## 2020-12-01 NOTE — PROGRESS NOTE ADULT - VASCULAR
No
Equal and normal pulses (carotid, femoral, dorsalis pedis)

## 2020-12-01 NOTE — PROGRESS NOTE ADULT - PROBLEM SELECTOR PLAN 9
DVT and GI prophylaxis  Continue rifaximin  Dapto for total of 6 weeks with end date 12/28/2020 (11/17 - 12/28/2020  S/P PICC line placement yesterday  Discharge planning underway.

## 2020-12-01 NOTE — PROGRESS NOTE ADULT - SUBJECTIVE AND OBJECTIVE BOX
Patient is seen and examined at the bed side, remains afebrile.  No over night events. The WBC count and creatinine stay normal.      REVIEW OF SYSTEMS: All other review systems are negative      ALLERGIES: No Known Allergies      Vital Signs Last 24 Hrs  T(C): 36.7 (01 Dec 2020 05:00), Max: 36.9 (30 Nov 2020 20:58)  T(F): 98 (01 Dec 2020 05:00), Max: 98.5 (30 Nov 2020 20:58)  HR: 93 (01 Dec 2020 05:00) (93 - 104)  BP: 119/74 (01 Dec 2020 05:00) (111/72 - 119/74)  BP(mean): --  RR: 16 (01 Dec 2020 05:00) (16 - 20)  SpO2: 100% (01 Dec 2020 05:00) (100% - 100%)        PHYSICAL EXAM:  GENERAL: Not in distress, on oxygen via NC  CHEST/LUNG: Not using accessory muscles   HEART: s1 and s2 present  ABDOMEN:  Nontender and  Nondistended  EXTREMITIES: B/L UE edematous improved  CNS: Awake and Alert         LABS:                        8.2    5.93  )-----------( 297      ( 01 Dec 2020 06:42 )             27.7                9.9    9.41  )-----------( 269      ( 24 Nov 2020 06:38 )             31.5                           9.4    13.14 )-----------( 149      ( 14 Nov 2020 07:58 )             28.7       12-01    147<H>  |  110<H>  |  18  ----------------------------<  82  3.5   |  34<H>  |  0.70    Ca    8.1<L>      01 Dec 2020 06:42  Phos  3.6     12-01  Mg     1.6     12-01    TPro  6.3  /  Alb  2.0<L>  /  TBili  1.7<H>  /  DBili  x   /  AST  62<H>  /  ALT  46  /  AlkPhos  257<H>  12-01 11-29    147<H>  |  111<H>  |  19<H>  ----------------------------<  74  3.4<L>   |  30  |  0.86    Ca    8.1<L>      29 Nov 2020 06:46  Phos  3.4     11-29  Mg     1.5     11-29    TPro  6.5  /  Alb  2.0<L>  /  TBili  2.0<H>  /  DBili  x   /  AST  67<H>  /  ALT  48  /  AlkPhos  245<H>  11-29 11-06    138  |  104  |  37<H>  ----------------------------<  81  3.9   |  25  |  2.37<H>    Ca    8.1<L>      06 Nov 2020 06:20  Phos  3.4     11-06  Mg     2.0     11-06    TPro  5.1<L>  /  Alb  2.8<L>  /  TBili  9.2<H>  /  DBili  x   /  AST  233<H>  /  ALT  99<H>  /  AlkPhos  96  11-06      Vancomycin Level, Trough (11.07.20 @ 21:49)   Vancomycin Level, Trough: 16.1:     Vancomycin Level, Trough (11.07.20 @ 07:08)   Vancomycin Level, Trough: 19.5    Vancomycin Level, Trough (11.06.20 @ 06:20)   Vancomycin Level, Trough: 19.9:       Procalcitonin, Serum (11.28.20 @ 15:38)   Procalcitonin, Serum: 1.55:       MEDICATIONS  (STANDING):    ascorbic acid 500 milliGRAM(s) Oral two times a day  chlorhexidine 2% Cloths 1 Application(s) Topical daily  chlorhexidine 2% Cloths 1 Application(s) Topical daily  DAPTOmycin IVPB 350 milliGRAM(s) IV Intermittent every 24 hours  enoxaparin Injectable 50 milliGRAM(s) SubCutaneous daily  fluconAZOLE IVPB 200 milliGRAM(s) IV Intermittent every 24 hours  fluconAZOLE IVPB      furosemide    Tablet 20 milliGRAM(s) Oral daily  meropenem  IVPB 500 milliGRAM(s) IV Intermittent every 8 hours  multivitamin/minerals 1 Tablet(s) Oral daily  mupirocin 2% Nasal 1 Application(s) Nasal two times a day  nystatin Powder 1 Application(s) Topical two times a day  pantoprazole   Suspension 40 milliGRAM(s) Enteral Tube daily  rifAXIMin 550 milliGRAM(s) Oral two times a day  zinc sulfate 220 milliGRAM(s) Oral daily          RADIOLOGY & ADDITIONAL TESTS:    11/27/20 : Xray Chest 1 View- PORTABLE-Urgent (Xray Chest 1 View- PORTABLE-Urgent .) (11.27.20 @ 06:53) Increased interstitial lung markings with bilateral consolidations right greater than left. Small right pleural effusion. Overall worsening compared to prior exam.      11/24/20 : MR Pelvis Bony Only No Cont (11.24.20 @ 18:02) : Osteomyelitis of the lower sacrum and coccyx with overlying cutaneous ulceration,      11/19/20 : Xray Chest 1 View- PORTABLE-Urgent (Xray Chest 1 View- PORTABLE-Urgent .) (11.19.20 @ 23:53): Improvement in bilateral airspace disease with persistent bibasilar consolidation and small effusions.    11/4/20 : Xray Chest 1 View- PORTABLE-Routine (Xray Chest 1 View- PORTABLE-Routine in AM.) (11.04.20 @ 10:59) Reexpansion of the left lung with residual left pleural effusion and/or infiltrate.  Right pulmonary edema unchanged.  Tubes and catheters in satisfactory position.      10/25/20: Xray Chest 1 View- PORTABLE-Routine (Xray Chest 1 View- PORTABLE-Routine in AM.) (10.25.20 @ 09:21) Frontal expiratory view of the chest shows the heart to be similar in size. Endotracheal tube, right jugular line and feeding tube remain present. The lungs show similar left upper lobe infiltrate with progression of right perihilar infiltrate. Pleural effusions are similar. There is no evidence of pneumothorax.    10/21/20 : CT Abdomen and Pelvis w/ Oral Cont and w/ IV Cont (10.21.20 @ 15:59) Mural thickening of the left colon and rectum. Liquid stool in the colon. Apparent segmental mural thickening of the mid to distal small bowel and the proximal duodenum. Findings may represent nonspecific enterocolitis, noninfectious inflammatory bowel disease, or ischemic bowel. Clinical correlation is recommended. The celiac axis artery, SMA, and KINA are patent without stenosis.    Dilatation of the mid small bowel is again noted. Oral contrast has reached the terminal ileum. Findings may represent small bowel obstruction, or ileus related to nonspecific enterocolitis.   Cholelithiasis. Moderate to large ascites in the abdomen, increased since the previous examination. 5 mm nonspecific noncalcified left upper lobe lung nodule; if the patient's is in the high risk category (i.e. smoker), follow-up chest CT may be pursued in 12 months to ensure stability. Combination of atelectasis and consolidation in the left lower lobe.  Possible 1.0 cm hypodense lesion in the left lobe of the thyroid. Thyroid ultrasound may be pursued for further evaluation.   Mild bilateral pleural effusions.  Aging determinate compression fracture at T5 vertebra.        MICROBIOLOGY DATA:    MRSA/MSSA PCR (11.28.20 @ 11:34)   MRSA PCR Result.: Detected:     Urine Microscopic-Add On (NC) (11.20.20 @ 04:12)   Red Blood Cell - Urine: 5-10 /HPF   White Blood Cell - Urine: 11-25 /HPF   Calcium Oxalate Crystals: Few /HPF   Bacteria: Moderate /HPF   Comment - Urine: few amorphous urates   Epithelial Cells: Few /HPF     Culture - Blood (11.19.20 @ 10:19)   Specimen Source: .Blood Blood-Peripheral   Culture Results: No growth to date.     Culture - Surgical Swab (11.12.20 @ 05:01)   - Ampicillin: R >8 Predicts results to ampicillin/sulbactam, amoxacillin-clavulanate and piperacillin-tazobactam.   - Levofloxacin: R >4   - Linezolid: S 1   - Tetra/Doxy: R >8   - Vancomycin: R >16   Specimen Source: .Surgical Swab Sacral decub   Culture Results:   Few Enterococcus faecium (vancomycin resistant)   Rare Candida albicans "Susceptibilities not performed"   Organism Identification: Enterococcus faecium (vancomycin resistant)   Organism: Enterococcus faecium (vancomycin resistant)   Method Type: STEWART     Culture - Surgical Swab (11.12.20 @ 05:01)   Specimen Source: .Surgical Swab Sacral decub   Culture Results:  Few Enterococcus faecium Susceptibility to follow.   Rare Candida albicans "Susceptibilities not performed"     Culture - Sputum . (10.26.20 @ 00:26)   - Ampicillin/Sulbactam: R <=8/4   - Cefazolin: R >16   - Ceftazidime: I 16   - Clindamycin: R >4   - Erythromycin: R >4   - Gentamicin: S <=1 Should not be used as monotherapy   - Levofloxacin: S <=0.5   - Linezolid: S 2   - Oxacillin: R >2   - Penicillin: R >8   - RIF- Rifampin: S <=1 Should not be used as monotherapy   - Tetra/Doxy: S <=1   - Trimethoprim/Sulfamethoxazole: S <=0.5/9.5   - Trimethoprim/Sulfamethoxazole: S <=0.5/9.5   - Vancomycin: S 1     MRSA/MSSA PCR (10.21.20 @ 09:41)   MRSA PCR Result.: Detected:       Culture - Blood (10.20.20 @ 22:09)   Specimen Source: .Blood Blood   Culture Results:  No growth to date.     Culture - Blood (10.20.20 @ 22:09)   Specimen Source: .Blood Blood   Culture Results:   No growth to date.     Culture - Blood in AM (10.16.20 @ 10:13)   Specimen Source: .Blood Blood-Peripheral   Culture Results: No growth to date.     Culture - Blood in AM (10.16.20 @ 10:13)   Specimen Source: .Blood Blood-Peripheral   Culture Results: No growth to date.     Culture - Blood (10.13.20 @ 22:23)   Growth in anaerobic bottle: Gram Negative Rods   Specimen Source: .Blood Blood-Peripheral   Organism: Blood Culture PCR   Culture Results: Growth in anaerobic bottle: Bacteroides fragilis   "Susceptibilities not performed"     Culture - Blood (10.13.20 @ 22:23)   Specimen Source: .Blood Blood-Peripheral   Culture Results:   No growth to date.

## 2020-12-01 NOTE — PROGRESS NOTE ADULT - NUTRITIONAL ASSESSMENT
This patient has been assessed with a concern for Malnutrition and has been determined to have a diagnosis/diagnoses of Severe protein-calorie malnutrition.  Protein 6.3  Albumin 2.0      This patient is being managed with:   Diet Dysphagia 1 Pureed-Nectar Consistency Fluid-  Supplement Feeding Modality:  Oral  Two Papi HN Cans or Servings Per Day:  3       Frequency:  Three Times a day  Ensure Pudding Cans or Servings Per Day:  2       Frequency:  Two Times a day  Entered: Nov 13 2020  1:47PM

## 2020-12-01 NOTE — PROGRESS NOTE ADULT - SUBJECTIVE AND OBJECTIVE BOX
MARIBETH PADILLA    SCU progress note    INTERVAL HPI/OVERNIGHT EVENTS: ***No overnight events.    DNR [x ]   DNI  [  ]   TRIAL OF INTUBATION    Covid - 19 PCR: Negative 11/30    The 4Ms    What Matters Most: see [facility]  Age appropriate Medications/Screen for High Risk Medication: Yes  Mentation: see CAM below  Mobility: defer to physical exam    The Confusion Assessment Method (CAM) Diagnostic Algorithm     1: Acute Onset or Fluctuating Course  - Is there evidence of an acute change in mental status from the patient’s baseline? Did the (abnormal) behavior  fluctuate during the day, that is, tend to come and go, or increase and decrease in severity?  [ ] YES [x ] NO     2: Inattention  - Did the patient have difficulty focusing attention, being easily distractible, or having difficulty keeping track of what was being said?  [ ] YES [x ] NO     3: Disorganized thinking  -Was the patient’s thinking disorganized or incoherent, such as rambling or irrelevant conversation, unclear or illogical flow of ideas, or unpredictable switching from subject to subject?  [ ] YES [x ] NO    4: Altered Level of consciousness?  [ ] YES [x ] NO    The diagnosis of delirium by CAM requires the presence of features 1 and 2 and either 3 or 4.    PRESSORS: [ ] YES [x ] NO  DAPTOmycin IVPB 350 milliGRAM(s) IV Intermittent every 24 hours  fluconAZOLE IVPB 200 milliGRAM(s) IV Intermittent every 24 hours  fluconAZOLE IVPB      meropenem  IVPB 500 milliGRAM(s) IV Intermittent every 8 hours  rifAXIMin 550 milliGRAM(s) Oral two times a day    Cardiovascular:  Heart Failure  Acute   Acute on Chronic  Chronic       furosemide    Tablet 20 milliGRAM(s) Oral daily    Pulmonary:  ALBUTerol    90 MICROgram(s) HFA Inhaler 2 Puff(s) Inhalation every 6 hours PRN    Hematalogic:  enoxaparin Injectable 50 milliGRAM(s) SubCutaneous daily    Other:  acetaminophen   Tablet .. 650 milliGRAM(s) Oral every 6 hours PRN  ascorbic acid 500 milliGRAM(s) Oral two times a day  chlorhexidine 2% Cloths 1 Application(s) Topical daily  chlorhexidine 2% Cloths 1 Application(s) Topical daily  multivitamin/minerals 1 Tablet(s) Oral daily  mupirocin 2% Nasal 1 Application(s) Nasal two times a day  nystatin Powder 1 Application(s) Topical two times a day  pantoprazole   Suspension 40 milliGRAM(s) Enteral Tube daily  sodium chloride 0.9% lock flush 10 milliLiter(s) IV Push every 1 hour PRN  zinc sulfate 220 milliGRAM(s) Oral daily    acetaminophen   Tablet .. 650 milliGRAM(s) Oral every 6 hours PRN  ALBUTerol    90 MICROgram(s) HFA Inhaler 2 Puff(s) Inhalation every 6 hours PRN  ascorbic acid 500 milliGRAM(s) Oral two times a day  chlorhexidine 2% Cloths 1 Application(s) Topical daily  chlorhexidine 2% Cloths 1 Application(s) Topical daily  DAPTOmycin IVPB 350 milliGRAM(s) IV Intermittent every 24 hours  enoxaparin Injectable 50 milliGRAM(s) SubCutaneous daily  fluconAZOLE IVPB 200 milliGRAM(s) IV Intermittent every 24 hours  fluconAZOLE IVPB      furosemide    Tablet 20 milliGRAM(s) Oral daily  meropenem  IVPB 500 milliGRAM(s) IV Intermittent every 8 hours  multivitamin/minerals 1 Tablet(s) Oral daily  mupirocin 2% Nasal 1 Application(s) Nasal two times a day  nystatin Powder 1 Application(s) Topical two times a day  pantoprazole   Suspension 40 milliGRAM(s) Enteral Tube daily  rifAXIMin 550 milliGRAM(s) Oral two times a day  sodium chloride 0.9% lock flush 10 milliLiter(s) IV Push every 1 hour PRN  zinc sulfate 220 milliGRAM(s) Oral daily    Drug Dosing Weight  Height (cm): 167.6 (21 Oct 2020 02:26)  Weight (kg): 56.2 (21 Oct 2020 02:26)  BMI (kg/m2): 20 (21 Oct 2020 02:26)  BSA (m2): 1.63 (21 Oct 2020 02:26)    CENTRAL LINE: [ ] YES [ ] NO  LOCATION:   DATE INSERTED:  REMOVE: [ ] YES [ ] NO  EXPLAIN:    TREJO: [ ] YES [x ] NO    DATE INSERTED:  REMOVE:  [ ] YES [ ] NO  EXPLAIN:    PAST MEDICAL & SURGICAL HISTORY:  Anasarca    Pulmonary embolism    DVT (deep venous thrombosis)    SBO (small bowel obstruction)    H/O exploratory laparotomy          PHYSICAL EXAM:    GENERAL: NAD, well-groomed, well-developed  HEAD:  Atraumatic, Normocephalic  EYES: EOMI, PERRLA, conjunctiva and sclera clear  ENMT: No tonsillar erythema, exudates  NECK: Supple, No JVD  NERVOUS SYSTEM:  Alert & Oriented X1/2  CHEST/LUNG: Diminished breath sounds bilateral bases R>L. Few crackles right base  HEART: Regular rate and rhythm; No murmurs, rubs, or gallops  ABDOMEN: Soft, Nontender, Nondistended; Bowel sounds present  EXTREMITIES:  2+ Peripheral Pulses, No clubbing, cyanosis, or edema  LYMPH: No lymphadenopathy noted  SKIN: Unstageable sacrum      LABS:  CBC Full  -  ( 01 Dec 2020 06:42 )  WBC Count : 5.93 K/uL  RBC Count : 2.85 M/uL  Hemoglobin : 8.2 g/dL  Hematocrit : 27.7 %  Platelet Count - Automated : 297 K/uL  Mean Cell Volume : 97.2 fl  Mean Cell Hemoglobin : 28.8 pg  Mean Cell Hemoglobin Concentration : 29.6 gm/dL  Auto Neutrophil # : 4.21 K/uL  Auto Lymphocyte # : 1.06 K/uL  Auto Monocyte # : 0.40 K/uL  Auto Eosinophil # : 0.18 K/uL  Auto Basophil # : 0.05 K/uL  Auto Neutrophil % : 71.1 %  Auto Lymphocyte % : 17.9 %  Auto Monocyte % : 6.7 %  Auto Eosinophil % : 3.0 %  Auto Basophil % : 0.8 %    12-01    147<H>  |  110<H>  |  18  ----------------------------<  82  3.5   |  34<H>  |  0.70    Ca    8.1<L>      01 Dec 2020 06:42  Phos  3.6     12-01  Mg     1.6     12-01    TPro  6.3  /  Alb  2.0<L>  /  TBili  1.7<H>  /  DBili  x   /  AST  62<H>  /  ALT  46  /  AlkPhos  257<H>  12-01              [  ]  DVT Prophylaxis  [  ]  Nutrition, Brand, Rate         Goal Rate        Abnormal Nutritional Status -  Malnutrition   Cachexia          RADIOLOGY & ADDITIONAL STUDIES:  ***    < from: Xray Chest 1 View-PORTABLE IMMEDIATE (Xray Chest 1 View-PORTABLE IMMEDIATE .) (11.30.20 @ 15:01) >  There is atelectatic consolidation of the left lower lobe. There is significant infiltrate in most of the right midlung field with concentration in the right lower lobe with associated effusion. The infiltrate has increased in the right upper lobe compared to November 29.    Present film shows a left PICC line with tip in superior vena cava.    IMPRESSION: Advanced bilateral lung processes somewhat increased in the right upper lobe. Left PICC line inserted.      < end of copied text >  Goals of Care Discussion with Family/Proxy/Other   - see note from 11/18

## 2020-12-01 NOTE — PROGRESS NOTE ADULT - PROBLEM SELECTOR PLAN 5
Multifactorial: Multiple organ failure, Elevated LFTS, Elevated amnionia level and baseline dementia.  Improving.

## 2020-12-01 NOTE — PROGRESS NOTE ADULT - PROBLEM SELECTOR PLAN 4
Medication: adderall  Sig: once daily  Qty: 30  Dosage: 20mg  Last Refill: one month ago  Pharmacy: [facility] [phone]  Patient last seen:  Next appointment to be seen:      Patients mother is calling these requests in for patient.  She is asking for a call back to advise when this is completed, she would also like to discuss some other issues with you regarding patient.  She would not go into details as to what she wanted to discuss.   Secondary to chronic disease  H&H low but stable  Continue to monitor.

## 2020-12-01 NOTE — PROGRESS NOTE ADULT - PROBLEM SELECTOR PLAN 8
S/P surgical debridement  Surgery reconsulted for revaluation of sacral wound.  Continue wound care.

## 2020-12-01 NOTE — PROGRESS NOTE ADULT - SUBJECTIVE AND OBJECTIVE BOX
Patient was seen and examined  Patient is a 65y old  Female who presents with a chief complaint of Hypotension (01 Dec 2020 11:19)      INTERVAL HPI/OVERNIGHT EVENTS:  T(C): 36.7 (12-01-20 @ 05:00), Max: 36.9 (11-30-20 @ 20:58)  HR: 93 (12-01-20 @ 05:00) (93 - 104)  BP: 119/74 (12-01-20 @ 05:00) (111/72 - 119/74)  RR: 16 (12-01-20 @ 05:00) (16 - 20)  SpO2: 100% (12-01-20 @ 05:00) (100% - 100%)  Wt(kg): --  I&O's Summary      LABS:                        8.2    5.93  )-----------( 297      ( 01 Dec 2020 06:42 )             27.7     12-01    147<H>  |  110<H>  |  18  ----------------------------<  82  3.5   |  34<H>  |  0.70    Ca    8.1<L>      01 Dec 2020 06:42  Phos  3.6     12-01  Mg     1.6     12-01    TPro  6.3  /  Alb  2.0<L>  /  TBili  1.7<H>  /  DBili  x   /  AST  62<H>  /  ALT  46  /  AlkPhos  257<H>  12-01        CAPILLARY BLOOD GLUCOSE      POCT Blood Glucose.: 88 mg/dL (01 Dec 2020 05:48)  POCT Blood Glucose.: 106 mg/dL (01 Dec 2020 00:08)              MEDICATIONS  (STANDING):  ascorbic acid 500 milliGRAM(s) Oral two times a day  chlorhexidine 2% Cloths 1 Application(s) Topical daily  chlorhexidine 2% Cloths 1 Application(s) Topical daily  DAPTOmycin IVPB 350 milliGRAM(s) IV Intermittent every 24 hours  enoxaparin Injectable 50 milliGRAM(s) SubCutaneous daily  fluconAZOLE IVPB 200 milliGRAM(s) IV Intermittent every 24 hours  fluconAZOLE IVPB      furosemide    Tablet 20 milliGRAM(s) Oral daily  meropenem  IVPB 500 milliGRAM(s) IV Intermittent every 8 hours  multivitamin/minerals 1 Tablet(s) Oral daily  mupirocin 2% Nasal 1 Application(s) Nasal two times a day  nystatin Powder 1 Application(s) Topical two times a day  pantoprazole   Suspension 40 milliGRAM(s) Enteral Tube daily  rifAXIMin 550 milliGRAM(s) Oral two times a day  zinc sulfate 220 milliGRAM(s) Oral daily    MEDICATIONS  (PRN):  acetaminophen   Tablet .. 650 milliGRAM(s) Oral every 6 hours PRN Temp greater or equal to 38C (100.4F)  ALBUTerol    90 MICROgram(s) HFA Inhaler 2 Puff(s) Inhalation every 6 hours PRN Shortness of Breath and/or Wheezing  sodium chloride 0.9% lock flush 10 milliLiter(s) IV Push every 1 hour PRN Pre/post blood products, medications, blood draw, and to maintain line patency      RADIOLOGY & ADDITIONAL TESTS:    Imaging Personally Reviewed:  [ ] YES  [ ] NO    REVIEW OF SYSTEMS:  CONSTITUTIONAL: No fever, weight loss, or fatigue  EYES: No eye pain, visual disturbances, or discharge  ENMT:  No difficulty hearing, tinnitus, vertigo; No sinus or throat pain  NECK: No pain or stiffness  BREASTS: No pain, masses, or nipple discharge  RESPIRATORY: No cough, wheezing, chills or hemoptysis; No shortness of breath  CARDIOVASCULAR: No chest pain, palpitations, dizziness, or leg swelling  GASTROINTESTINAL: No abdominal or epigastric pain. No nausea, vomiting, or hematemesis; No diarrhea or constipation. No melena or hematochezia.  GENITOURINARY: No dysuria, frequency, hematuria, or incontinence  NEUROLOGICAL: No headaches, memory loss, loss of strength, numbness, or tremors  SKIN: No itching, burning, rashes, or lesions   LYMPH NODES: No enlarged glands  ENDOCRINE: No heat or cold intolerance; No hair loss  MUSCULOSKELETAL: No joint pain or swelling; No muscle, back, or extremity pain  PSYCHIATRIC: No depression, anxiety, mood swings, or difficulty sleeping  HEME/LYMPH: No easy bruising, or bleeding gums  ALLERY AND IMMUNOLOGIC: No hives or eczema      Consultant(s) Notes Reviewed:  [ ] YES  [ ] NO    PHYSICAL EXAM:  GENERAL: NAD, well-groomed, well-developed  HEAD:  Atraumatic, Normocephalic  EYES: EOMI, PERRLA, conjunctiva and sclera clear  ENMT: No tonsillar erythema, exudates, or enlargement; Moist mucous membranes, Good dentition, No lesions  NECK: Supple, No JVD, Normal thyroid  NERVOUS SYSTEM:  Alert & Oriented X3, Good concentration; Motor Strength 5/5 B/L upper and lower extremities; DTRs 2+ intact and symmetric  CHEST/LUNG: Clear to percussion bilaterally; No rales, rhonchi, wheezing, or rubs  HEART: Regular rate and rhythm; No murmurs, rubs, or gallops  ABDOMEN: Soft, Nontender, Nondistended; Bowel sounds present  EXTREMITIES:  2+ Peripheral Pulses, No clubbing, cyanosis, or edema  LYMPH: No lymphadenopathy noted  SKIN: sacral decubiti     Care Discussed with Consultants/Other Providers [ x] YES  [ ] NO

## 2020-12-01 NOTE — PROGRESS NOTE ADULT - PROBLEM SELECTOR PLAN 1
Secondary to bacteremia . Bacteroides fragilis per BC on 10/13  Sputum positive for MRSA and Stenotrophomonas  Aspiration pneumonia vs HCAP .   Repeat BC 10/16 and 11/19 NGTD ,UC on 11/20 NGTD   Infected sacral ulcer- s/p debridement and culture grew Enterococcus (VRE) and Candida ( sensitivity is pending)  Leukocytosis resolved, Cdiff negative  MRI sacrum/pelvis shows OM   c/w abx per ID   `PICC  for extended abx (for total of 6 weeks, thru Dec 28th). Placed today in IR.

## 2020-12-01 NOTE — PROGRESS NOTE ADULT - PROBLEM SELECTOR PLAN 2
Echo shows improved EF   s/p IV Lasix 11/27 for pleural effusions  Currently appears euvolemic  Lasix po 20mg with hold parameters   Continue supportive care.

## 2020-12-01 NOTE — PROGRESS NOTE ADULT - ASSESSMENT
Patient is a 65y old  Female from home, lives with daughter, ambulates with walker with PMH of recurrent SBO's, s/p exp lap, SB resection in 2015, ex lap, ALBINA in 2018, DVT, PE, on Xarelto, IVC filter, chronic leg swelling, and anasarca, Now send in to the ER by her PCP, Dr. Ross, for evaluation of  generalized weakness and hypotension BP of 80/40 during office visit. On admission, she found to have no fever and BP was in lower normal range and no Leukocytosis. The CXR is clear and CT abd/pelvis consistent with Ileus. The ID consult requested to assist with evaluation of infectious etiology of episode of hypotension. Found to have Bacteroides bacteremia and now developed septic shock on Cefepime and Flagyl. Hence transferred to ICU. 10/20/20.    # Hypotension  at office- BP of 80/40 - resolved   #  Bacteroides fragilis Bacteremia - 10/13/20 - ? source most likely GI- Repeat Blood Cxs have NGTD 10/16/20  # Ileus  - h/o recurrent SBO and s/p EXp. LAp  # COVID 19 negative   # Septic shock ( Hypothermia + hypotensive)- transferred to ICU since requiring pressor- source GI, The CT abd/pelvis shows nonspecific enterocolitis, noninfectious inflammatory bowel disease, or ischemic bowel, along with ileus.   # Pneumonia- HCAP vs Aspiration - on CXR 10/24 and CT chest 10/21- sputum Cx grew Methicillin resistant Staphylococcus aureus  and Stenotrophomonas maltophilia.  # S/p extubated 11/9/20  # Infected sacral ulcer- s/p debridement and culture grew Enterococcus, VRE and Candida, sensitivity is pending- The MRI of pelvis shows  Osteomyelitis of the lower sacrum and coccyx with overlying cutaneous ulceration.   # Fever- 11/8 and 11/19- BCX NGTD 11/19 - The CXR shows Improvement in bilateral airspace disease with persistent bibasilar consolidation. She is s/p removal of velazquez catheter and passed TOV.  # HCAP- 11/27/20,  Intermittent fever and CXR shows  bilateral consolidations right greater than left. - The procalcitonin level is 1.55  # s/p PICC line placement 11/30      would recommend:    1.  Surgical follow up for further debridement of sacral wound   2. Continue Meropenem X 7 days in total   3. Continue Daptomycin to cover VRE  and  Fluconazole to cover Candida in sacral wound culture X 6  weeks to treat Osteomyelitis, might change to Linezolid for last two weeks if she is OFf midodrine   4. Aspiration precaution  5. Frequent repositioning     d/w Covering NPCammie     Attending Attestation:    Spent more than 45 minutes on total encounter, more than 50 % of the visit was spent counseling and/or coordinating care by the Attending physician.

## 2020-12-02 NOTE — PROGRESS NOTE ADULT - SUBJECTIVE AND OBJECTIVE BOX
no fever  feel ok  no sob or pain    MEDICATIONS  (STANDING):  ascorbic acid 500 milliGRAM(s) Oral two times a day  chlorhexidine 2% Cloths 1 Application(s) Topical daily  chlorhexidine 2% Cloths 1 Application(s) Topical daily  DAPTOmycin IVPB 350 milliGRAM(s) IV Intermittent every 24 hours  enoxaparin Injectable 50 milliGRAM(s) SubCutaneous daily  fluconAZOLE IVPB 200 milliGRAM(s) IV Intermittent every 24 hours  fluconAZOLE IVPB      furosemide    Tablet 20 milliGRAM(s) Oral daily  meropenem  IVPB 500 milliGRAM(s) IV Intermittent every 8 hours  multivitamin/minerals 1 Tablet(s) Oral daily  mupirocin 2% Nasal 1 Application(s) Nasal two times a day  nystatin Powder 1 Application(s) Topical two times a day  pantoprazole   Suspension 40 milliGRAM(s) Enteral Tube daily  rifAXIMin 550 milliGRAM(s) Oral two times a day  zinc sulfate 220 milliGRAM(s) Oral daily    MEDICATIONS  (PRN):  acetaminophen   Tablet .. 650 milliGRAM(s) Oral every 6 hours PRN Temp greater or equal to 38C (100.4F)  ALBUTerol    90 MICROgram(s) HFA Inhaler 2 Puff(s) Inhalation every 6 hours PRN Shortness of Breath and/or Wheezing  sodium chloride 0.9% lock flush 10 milliLiter(s) IV Push every 1 hour PRN Pre/post blood products, medications, blood draw, and to maintain line patency      Allergies    No Known Allergies    Intolerances        Vital Signs Last 24 Hrs  T(C): 36.8 (02 Dec 2020 05:14), Max: 36.8 (01 Dec 2020 20:58)  T(F): 98.2 (02 Dec 2020 05:14), Max: 98.2 (01 Dec 2020 20:58)  HR: 101 (02 Dec 2020 05:14) (97 - 101)  BP: 113/71 (02 Dec 2020 05:14) (110/65 - 115/75)  BP(mean): --  RR: 18 (02 Dec 2020 05:14) (18 - 18)  SpO2: 100% (02 Dec 2020 05:14) (100% - 100%)    PHYSICAL EXAM  General: adult in NAD  HEENT: clear oropharynx, anicteric sclera, pink conjunctiva  Neck: supple  CV: normal S1/S2 with no murmur rubs or gallops  Lungs: positive air movement b/l ant lungs,clear to auscultation, no wheezes, no rales  Abdomen: soft non-tender non-distended, no hepatosplenomegaly  Ext: no clubbing cyanosis or edema  Skin: no rashes and no petechiae  Neuro: alert and oriented X 4, no focal deficits  LABS:                          8.8    5.74  )-----------( 283      ( 02 Dec 2020 07:42 )             29.3         Mean Cell Volume : 96.1 fl  Mean Cell Hemoglobin : 28.9 pg  Mean Cell Hemoglobin Concentration : 30.0 gm/dL  Auto Neutrophil # : x  Auto Lymphocyte # : x  Auto Monocyte # : x  Auto Eosinophil # : x  Auto Basophil # : x  Auto Neutrophil % : x  Auto Lymphocyte % : x  Auto Monocyte % : x  Auto Eosinophil % : x  Auto Basophil % : x    Serial CBC  Hematocrit 29.3  Hemoglobin 8.8  Plat 283  RBC 3.05  WBC 5.74  Serial CBC  Hematocrit 27.7  Hemoglobin 8.2  Plat 297  RBC 2.85  WBC 5.93  Serial CBC  Hematocrit 28.2  Hemoglobin 8.2  Plat 288  RBC 2.92  WBC 6.12  Serial CBC  Hematocrit 29.4  Hemoglobin 8.9  Plat 285  RBC 3.12  WBC 5.83    12-02    147<H>  |  109<H>  |  16  ----------------------------<  80  3.7   |  34<H>  |  0.74    Ca    8.6      02 Dec 2020 07:42  Phos  3.1     12-02  Mg     1.9     12-02    TPro  6.6  /  Alb  2.1<L>  /  TBili  1.6<H>  /  DBili  x   /  AST  74<H>  /  ALT  53  /  AlkPhos  304<H>  12-02                    BLOOD SMEAR INTERPRETATION:       RADIOLOGY & ADDITIONAL STUDIES:

## 2020-12-02 NOTE — PROGRESS NOTE ADULT - ASSESSMENT
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  BC grew bacteroides.    Problem/Recommendation - 1:  fever again  will do fever w/u  blood culture, CXR, stool c.diff  procal is more elevated than before  on antibiotics  has no fever for 4 days  Problem/Recommendation - 2:·  Problem: Anemia. ferritin, b12 folate normal, no hemolysis  most likley due to malnutrition from short bowel syndrome.she also had GIB multiple times before.   Problem/Recommendation - 3:·  Problem: Acute deep vein thrombosis (DVT) of other vein of upper extremity.  Recommendation: she has been on xarelto for 2 years.  DVT at left arm and left leg before.  in all CT scan i did not see report of IVC or hepatic vein thrombosis.  off xarelto and lovenox due to elevated PT/PTT. elevated PT/PTT due to malnutrition and antibiotics causing VITK def  now it is mostly recovered  it begins to rise again, correctable by mixing study  probably due to liver failure this time

## 2020-12-02 NOTE — PROGRESS NOTE ADULT - SUBJECTIVE AND OBJECTIVE BOX
Patient is seen and examined at the bed side, remains afebrile.  She is tolerating Abx well.      REVIEW OF SYSTEMS: All other review systems are negative      ALLERGIES: No Known Allergies      Vital Signs Last 24 Hrs  T(C): 36.8 (02 Dec 2020 05:14), Max: 36.8 (01 Dec 2020 20:58)  T(F): 98.2 (02 Dec 2020 05:14), Max: 98.2 (01 Dec 2020 20:58)  HR: 101 (02 Dec 2020 05:14) (97 - 101)  BP: 113/71 (02 Dec 2020 05:14) (110/65 - 115/75)  BP(mean): --  RR: 18 (02 Dec 2020 05:14) (18 - 18)  SpO2: 100% (02 Dec 2020 05:14) (100% - 100%)        PHYSICAL EXAM:  GENERAL: Not in distress, on oxygen via NC  CHEST/LUNG: Not using accessory muscles   HEART: s1 and s2 present  ABDOMEN:  Nontender and  Nondistended  EXTREMITIES: B/L UE edematous improved  CNS: Awake and Alert         LABS:                        8.8    5.74  )-----------( 283      ( 02 Dec 2020 07:42 )             29.3                  9.9    9.41  )-----------( 269      ( 24 Nov 2020 06:38 )             31.5                           9.4    13.14 )-----------( 149      ( 14 Nov 2020 07:58 )             28.7       12-02    147<H>  |  109<H>  |  16  ----------------------------<  80  3.7   |  34<H>  |  0.74    Ca    8.6      02 Dec 2020 07:42  Phos  3.1     12-02  Mg     1.9     12-02    TPro  6.6  /  Alb  2.1<L>  /  TBili  1.6<H>  /  DBili  x   /  AST  74<H>  /  ALT  53  /  AlkPhos  304<H>  12-02    12-01    147<H>  |  110<H>  |  18  ----------------------------<  82  3.5   |  34<H>  |  0.70    Ca    8.1<L>      01 Dec 2020 06:42  Phos  3.6     12-01  Mg     1.6     12-01    TPro  6.3  /  Alb  2.0<L>  /  TBili  1.7<H>  /  DBili  x   /  AST  62<H>  /  ALT  46  /  AlkPhos  257<H>  12-01 11-06    138  |  104  |  37<H>  ----------------------------<  81  3.9   |  25  |  2.37<H>    Ca    8.1<L>      06 Nov 2020 06:20  Phos  3.4     11-06  Mg     2.0     11-06    TPro  5.1<L>  /  Alb  2.8<L>  /  TBili  9.2<H>  /  DBili  x   /  AST  233<H>  /  ALT  99<H>  /  AlkPhos  96  11-06      Vancomycin Level, Trough (11.07.20 @ 21:49)   Vancomycin Level, Trough: 16.1:     Vancomycin Level, Trough (11.07.20 @ 07:08)   Vancomycin Level, Trough: 19.5    Vancomycin Level, Trough (11.06.20 @ 06:20)   Vancomycin Level, Trough: 19.9:       Procalcitonin, Serum (11.28.20 @ 15:38)   Procalcitonin, Serum: 1.55:       MEDICATIONS  (STANDING):    ascorbic acid 500 milliGRAM(s) Oral two times a day  chlorhexidine 2% Cloths 1 Application(s) Topical daily  chlorhexidine 2% Cloths 1 Application(s) Topical daily  DAPTOmycin IVPB 350 milliGRAM(s) IV Intermittent every 24 hours  enoxaparin Injectable 50 milliGRAM(s) SubCutaneous daily  fluconAZOLE IVPB 200 milliGRAM(s) IV Intermittent every 24 hours  fluconAZOLE IVPB      furosemide    Tablet 20 milliGRAM(s) Oral daily  meropenem  IVPB 500 milliGRAM(s) IV Intermittent every 8 hours  multivitamin/minerals 1 Tablet(s) Oral daily  mupirocin 2% Nasal 1 Application(s) Nasal two times a day  nystatin Powder 1 Application(s) Topical two times a day  pantoprazole   Suspension 40 milliGRAM(s) Enteral Tube daily  rifAXIMin 550 milliGRAM(s) Oral two times a day  zinc sulfate 220 milliGRAM(s) Oral daily        RADIOLOGY & ADDITIONAL TESTS:    11/27/20 : Xray Chest 1 View- PORTABLE-Urgent (Xray Chest 1 View- PORTABLE-Urgent .) (11.27.20 @ 06:53) Increased interstitial lung markings with bilateral consolidations right greater than left. Small right pleural effusion. Overall worsening compared to prior exam.      11/24/20 : MR Pelvis Bony Only No Cont (11.24.20 @ 18:02) : Osteomyelitis of the lower sacrum and coccyx with overlying cutaneous ulceration,      11/19/20 : Xray Chest 1 View- PORTABLE-Urgent (Xray Chest 1 View- PORTABLE-Urgent .) (11.19.20 @ 23:53): Improvement in bilateral airspace disease with persistent bibasilar consolidation and small effusions.    11/4/20 : Xray Chest 1 View- PORTABLE-Routine (Xray Chest 1 View- PORTABLE-Routine in AM.) (11.04.20 @ 10:59) Reexpansion of the left lung with residual left pleural effusion and/or infiltrate.  Right pulmonary edema unchanged.  Tubes and catheters in satisfactory position.      10/25/20: Xray Chest 1 View- PORTABLE-Routine (Xray Chest 1 View- PORTABLE-Routine in AM.) (10.25.20 @ 09:21) Frontal expiratory view of the chest shows the heart to be similar in size. Endotracheal tube, right jugular line and feeding tube remain present. The lungs show similar left upper lobe infiltrate with progression of right perihilar infiltrate. Pleural effusions are similar. There is no evidence of pneumothorax.    10/21/20 : CT Abdomen and Pelvis w/ Oral Cont and w/ IV Cont (10.21.20 @ 15:59) Mural thickening of the left colon and rectum. Liquid stool in the colon. Apparent segmental mural thickening of the mid to distal small bowel and the proximal duodenum. Findings may represent nonspecific enterocolitis, noninfectious inflammatory bowel disease, or ischemic bowel. Clinical correlation is recommended. The celiac axis artery, SMA, and KINA are patent without stenosis.    Dilatation of the mid small bowel is again noted. Oral contrast has reached the terminal ileum. Findings may represent small bowel obstruction, or ileus related to nonspecific enterocolitis.   Cholelithiasis. Moderate to large ascites in the abdomen, increased since the previous examination. 5 mm nonspecific noncalcified left upper lobe lung nodule; if the patient's is in the high risk category (i.e. smoker), follow-up chest CT may be pursued in 12 months to ensure stability. Combination of atelectasis and consolidation in the left lower lobe.  Possible 1.0 cm hypodense lesion in the left lobe of the thyroid. Thyroid ultrasound may be pursued for further evaluation.   Mild bilateral pleural effusions.  Aging determinate compression fracture at T5 vertebra.        MICROBIOLOGY DATA:    MRSA/MSSA PCR (11.28.20 @ 11:34)   MRSA PCR Result.: Detected:     Urine Microscopic-Add On (NC) (11.20.20 @ 04:12)   Red Blood Cell - Urine: 5-10 /HPF   White Blood Cell - Urine: 11-25 /HPF   Calcium Oxalate Crystals: Few /HPF   Bacteria: Moderate /HPF   Comment - Urine: few amorphous urates   Epithelial Cells: Few /HPF     Culture - Blood (11.19.20 @ 10:19)   Specimen Source: .Blood Blood-Peripheral   Culture Results: No growth to date.     Culture - Surgical Swab (11.12.20 @ 05:01)   - Ampicillin: R >8 Predicts results to ampicillin/sulbactam, amoxacillin-clavulanate and piperacillin-tazobactam.   - Levofloxacin: R >4   - Linezolid: S 1   - Tetra/Doxy: R >8   - Vancomycin: R >16   Specimen Source: .Surgical Swab Sacral decub   Culture Results:   Few Enterococcus faecium (vancomycin resistant)   Rare Candida albicans "Susceptibilities not performed"   Organism Identification: Enterococcus faecium (vancomycin resistant)   Organism: Enterococcus faecium (vancomycin resistant)   Method Type: STEWART     Culture - Surgical Swab (11.12.20 @ 05:01)   Specimen Source: .Surgical Swab Sacral decub   Culture Results:  Few Enterococcus faecium Susceptibility to follow.   Rare Candida albicans "Susceptibilities not performed"     Culture - Sputum . (10.26.20 @ 00:26)   - Ampicillin/Sulbactam: R <=8/4   - Cefazolin: R >16   - Ceftazidime: I 16   - Clindamycin: R >4   - Erythromycin: R >4   - Gentamicin: S <=1 Should not be used as monotherapy   - Levofloxacin: S <=0.5   - Linezolid: S 2   - Oxacillin: R >2   - Penicillin: R >8   - RIF- Rifampin: S <=1 Should not be used as monotherapy   - Tetra/Doxy: S <=1   - Trimethoprim/Sulfamethoxazole: S <=0.5/9.5   - Trimethoprim/Sulfamethoxazole: S <=0.5/9.5   - Vancomycin: S 1     MRSA/MSSA PCR (10.21.20 @ 09:41)   MRSA PCR Result.: Detected:       Culture - Blood (10.20.20 @ 22:09)   Specimen Source: .Blood Blood   Culture Results:  No growth to date.     Culture - Blood (10.20.20 @ 22:09)   Specimen Source: .Blood Blood   Culture Results:   No growth to date.     Culture - Blood in AM (10.16.20 @ 10:13)   Specimen Source: .Blood Blood-Peripheral   Culture Results: No growth to date.     Culture - Blood in AM (10.16.20 @ 10:13)   Specimen Source: .Blood Blood-Peripheral   Culture Results: No growth to date.     Culture - Blood (10.13.20 @ 22:23)   Growth in anaerobic bottle: Gram Negative Rods   Specimen Source: .Blood Blood-Peripheral   Organism: Blood Culture PCR   Culture Results: Growth in anaerobic bottle: Bacteroides fragilis   "Susceptibilities not performed"     Culture - Blood (10.13.20 @ 22:23)   Specimen Source: .Blood Blood-Peripheral   Culture Results:   No growth to date.

## 2020-12-02 NOTE — PROGRESS NOTE ADULT - ASSESSMENT
65y Female from home, lives with daughter, ambulates with walker with PMH of recurrent SBO's, s/p exp lap, SB resection in 2015, ex lap, ALBINA in 2018, DVT, PE, on Xarelto, IVC filter, chronic leg swelling, and anasarca, came in to the ED due to hypotension during office visit. Patient was found to be bacteremic with bacteroids fragilis. Admitted in ICU due to hypotension and hypothermia. patient completed course of antibiotics . BCx X2 negative. Ammonia level elevated and patient refused NGT placement, mental status deteriorated and patient desaturated to high 70%, patient got intubated on 10/24 . Sputum was positive for MRSA and Stenotrophomonas. patient started on vancomycin and Levaquin. patient was given lactulose for high ammonia level. kidney function and liver function started to deteriorate and patient was leaning toward multi organ failure . high dose of Lasix started and patient was aggressively diuresed. Kidney function improved . Ammonia level dropped to less than 10.  Mental status improved and patient extubated on 11/3/2020. Patient was seen by wound specialist and decubitus ulcer debrided. patient had a drop in h/h s/p debridement . received 1 unit PRBC .     11/12  Transferred from ICU to SCU  11/17  Midodrine d/c secondary to daptomycin being started.  11/18 Febrile, Tmax 101.5. Repeat BC sent.   11/19 Discontinue velazquez.   11/20 febrile overnight.   11/22 s/p 2 units PRBC for Hgb 6.4  11/24 MRI pelvis (+) OM  11/25 PAVAN no endocarditis. Awaiting PICC  11/27 Spiked fever of 101F; Repeated cultures, chest xray, lactate, Follow up results. IR Denies PICC Placement due to fever. Plan for IR Monday (NPO Sunday) if afebrile over the weekend. Given one dose of lasix IV today for pleural effusion on chest xray; Give one more dose of IV Lasix 40mg tomorrow at 2pm if BP stable. Continue with daily lasix.

## 2020-12-02 NOTE — PROGRESS NOTE ADULT - SUBJECTIVE AND OBJECTIVE BOX
MARIBETH PADILLA    SCU progress note    INTERVAL HPI/OVERNIGHT EVENTS: ***No overnight events    DNR [x ]   DNI  [  ]    TRIAL OF INTUBATION    Covid - 19 PCR:  Negative 11/28    The 4Ms    What Matters Most: see [facility]  Age appropriate Medications/Screen for High Risk Medication: Yes  Mentation: see CAM below  Mobility: defer to physical exam    The Confusion Assessment Method (CAM) Diagnostic Algorithm     1: Acute Onset or Fluctuating Course  - Is there evidence of an acute change in mental status from the patient’s baseline? Did the (abnormal) behavior  fluctuate during the day, that is, tend to come and go, or increase and decrease in severity?  [ ] YES [x ] NO     2: Inattention  - Did the patient have difficulty focusing attention, being easily distractible, or having difficulty keeping track of what was being said?  [ ] YES [x ] NO     3: Disorganized thinking  -Was the patient’s thinking disorganized or incoherent, such as rambling or irrelevant conversation, unclear or illogical flow of ideas, or unpredictable switching from subject to subject?  [ ] YES [x ] NO    4: Altered Level of consciousness?  [ ] YES [ ] NO    The diagnosis of delirium by CAM requires the presence of features 1 and 2 and either 3 or 4.    PRESSORS: [ ] YES [ ] NO  DAPTOmycin IVPB 350 milliGRAM(s) IV Intermittent every 24 hours  fluconAZOLE IVPB 200 milliGRAM(s) IV Intermittent every 24 hours  fluconAZOLE IVPB      meropenem  IVPB 500 milliGRAM(s) IV Intermittent every 8 hours  rifAXIMin 550 milliGRAM(s) Oral two times a day    Cardiovascular:  Heart Failure  Acute   Acute on Chronic  Chronic       furosemide    Tablet 20 milliGRAM(s) Oral daily    Pulmonary:  ALBUTerol    90 MICROgram(s) HFA Inhaler 2 Puff(s) Inhalation every 6 hours PRN    Hematalogic:  enoxaparin Injectable 50 milliGRAM(s) SubCutaneous daily    Other:  acetaminophen   Tablet .. 650 milliGRAM(s) Oral every 6 hours PRN  ascorbic acid 500 milliGRAM(s) Oral two times a day  chlorhexidine 2% Cloths 1 Application(s) Topical daily  chlorhexidine 2% Cloths 1 Application(s) Topical daily  multivitamin/minerals 1 Tablet(s) Oral daily  mupirocin 2% Nasal 1 Application(s) Nasal two times a day  nystatin Powder 1 Application(s) Topical two times a day  pantoprazole   Suspension 40 milliGRAM(s) Enteral Tube daily  sodium chloride 0.9% lock flush 10 milliLiter(s) IV Push every 1 hour PRN  zinc sulfate 220 milliGRAM(s) Oral daily    acetaminophen   Tablet .. 650 milliGRAM(s) Oral every 6 hours PRN  ALBUTerol    90 MICROgram(s) HFA Inhaler 2 Puff(s) Inhalation every 6 hours PRN  ascorbic acid 500 milliGRAM(s) Oral two times a day  chlorhexidine 2% Cloths 1 Application(s) Topical daily  chlorhexidine 2% Cloths 1 Application(s) Topical daily  DAPTOmycin IVPB 350 milliGRAM(s) IV Intermittent every 24 hours  enoxaparin Injectable 50 milliGRAM(s) SubCutaneous daily  fluconAZOLE IVPB 200 milliGRAM(s) IV Intermittent every 24 hours  fluconAZOLE IVPB      furosemide    Tablet 20 milliGRAM(s) Oral daily  meropenem  IVPB 500 milliGRAM(s) IV Intermittent every 8 hours  multivitamin/minerals 1 Tablet(s) Oral daily  mupirocin 2% Nasal 1 Application(s) Nasal two times a day  nystatin Powder 1 Application(s) Topical two times a day  pantoprazole   Suspension 40 milliGRAM(s) Enteral Tube daily  rifAXIMin 550 milliGRAM(s) Oral two times a day  sodium chloride 0.9% lock flush 10 milliLiter(s) IV Push every 1 hour PRN  zinc sulfate 220 milliGRAM(s) Oral daily    Drug Dosing Weight  Height (cm): 167.6 (21 Oct 2020 02:26)  Weight (kg): 56.2 (21 Oct 2020 02:26)  BMI (kg/m2): 20 (21 Oct 2020 02:26)  BSA (m2): 1.63 (21 Oct 2020 02:26)    CENTRAL LINE: [ ] YES [x ] NO  LOCATION:   DATE INSERTED:  REMOVE: [ ] YES [ ] NO  EXPLAIN:    TREJO: [ ] YES [ ] NO    DATE INSERTED:  REMOVE:  [ ] YES [x ] NO  EXPLAIN:    PAST MEDICAL & SURGICAL HISTORY:  Anasarca    Pulmonary embolism    DVT (deep venous thrombosis)    SBO (small bowel obstruction)    H/O exploratory laparotomy                      PHYSICAL EXAM:    GENERAL: NAD, thin  HEAD:  Atraumatic, Normocephalic  EYES: EOMI, PERRLA, conjunctiva and sclera clear  ENMT: No tonsillar erythema, exudates  NECK: Supple, No JVD  NERVOUS SYSTEM:  Alert & Oriented X2. Follows commands moving all extremities  CHEST/LUNG: Diminished breath sounds bilateral bases  HEART: Regular rate and rhythm; No murmurs, rubs, or gallops  ABDOMEN: Soft, Nontender, Nondistended; Bowel sounds present  EXTREMITIES:  2+ Peripheral Pulses, No clubbing, cyanosis, or edema  LYMPH: No lymphadenopathy noted  SKIN:  Unstageable sacrum      LABS:  CBC Full  -  ( 01 Dec 2020 06:42 )  WBC Count : 5.93 K/uL  RBC Count : 2.85 M/uL  Hemoglobin : 8.2 g/dL  Hematocrit : 27.7 %  Platelet Count - Automated : 297 K/uL  Mean Cell Volume : 97.2 fl  Mean Cell Hemoglobin : 28.8 pg  Mean Cell Hemoglobin Concentration : 29.6 gm/dL  Auto Neutrophil # : 4.21 K/uL  Auto Lymphocyte # : 1.06 K/uL  Auto Monocyte # : 0.40 K/uL  Auto Eosinophil # : 0.18 K/uL  Auto Basophil # : 0.05 K/uL  Auto Neutrophil % : 71.1 %  Auto Lymphocyte % : 17.9 %  Auto Monocyte % : 6.7 %  Auto Eosinophil % : 3.0 %  Auto Basophil % : 0.8 %    12-01    147<H>  |  110<H>  |  18  ----------------------------<  82  3.5   |  34<H>  |  0.70    Ca    8.1<L>      01 Dec 2020 06:42  Phos  3.6     12-01  Mg     1.6     12-01    TPro  6.3  /  Alb  2.0<L>  /  TBili  1.7<H>  /  DBili  x   /  AST  62<H>  /  ALT  46  /  AlkPhos  257<H>  12-01              [  ]  DVT Prophylaxis  [  ]  Nutrition, Brand, Rate         Goal Rate        Abnormal Nutritional Status -  Malnutrition   Cachexia          RADIOLOGY & ADDITIONAL STUDIES:  ***  < from: Xray Chest 1 View-PORTABLE IMMEDIATE (Xray Chest 1 View-PORTABLE IMMEDIATE .) (11.30.20 @ 15:01) >  There is atelectatic consolidation of the left lower lobe. There is significant infiltrate in most of the right midlung field with concentration in the right lower lobe with associated effusion. The infiltrate has increased in the right upper lobe compared to November 29.    Present film shows a left PICC line with tip in superior vena cava.    IMPRESSION: Advanced bilateral lung processes somewhat increased in the right upper lobe. Left PICC line inserted.    < end of copied text >    Goals of Care Discussion with Family/Proxy/Other   - see note from 11/18

## 2020-12-02 NOTE — PROGRESS NOTE ADULT - ASSESSMENT
Patient is a 65y old  Female from home, lives with daughter, ambulates with walker with PMH of recurrent SBO's, s/p exp lap, SB resection in 2015, ex lap, ALBINA in 2018, DVT, PE, on Xarelto, IVC filter, chronic leg swelling, and anasarca, Now send in to the ER by her PCP, Dr. Ross, for evaluation of  generalized weakness and hypotension BP of 80/40 during office visit. On admission, she found to have no fever and BP was in lower normal range and no Leukocytosis. The CXR is clear and CT abd/pelvis consistent with Ileus. The ID consult requested to assist with evaluation of infectious etiology of episode of hypotension. Found to have Bacteroides bacteremia and now developed septic shock on Cefepime and Flagyl. Hence transferred to ICU. 10/20/20.    # Hypotension  at office- BP of 80/40 - resolved   #  Bacteroides fragilis Bacteremia - 10/13/20 - ? source most likely GI- Repeat Blood Cxs have NGTD 10/16/20  # Ileus  - h/o recurrent SBO and s/p EXp. LAp  # COVID 19 negative   # Septic shock ( Hypothermia + hypotensive)- transferred to ICU since requiring pressor- source GI, The CT abd/pelvis shows nonspecific enterocolitis, noninfectious inflammatory bowel disease, or ischemic bowel, along with ileus.   # Pneumonia- HCAP vs Aspiration - on CXR 10/24 and CT chest 10/21- sputum Cx grew Methicillin resistant Staphylococcus aureus  and Stenotrophomonas maltophilia.  # S/p extubated 11/9/20  # Infected sacral ulcer- s/p debridement and culture grew Enterococcus, VRE and Candida, sensitivity is pending- The MRI of pelvis shows  Osteomyelitis of the lower sacrum and coccyx with overlying cutaneous ulceration.   # Fever- 11/8 and 11/19- BCX NGTD 11/19 - The CXR shows Improvement in bilateral airspace disease with persistent bibasilar consolidation. She is s/p removal of velazquez catheter and passed TOV.  # HCAP- 11/27/20,  Intermittent fever and CXR shows  bilateral consolidations right greater than left. - The procalcitonin level is 1.55  # s/p PICC line placement 11/30      would recommend:    1. OOB to chair   2. Continue Meropenem X 7 days in total   3. Continue Daptomycin to cover VRE  and  Fluconazole to cover Candida in sacral wound culture X 6  weeks to treat Osteomyelitis, might change to Linezolid for last two weeks of the course  4. Aspiration precaution  5. Frequent repositioning     d/w Covering Cammie POOL     Attending Attestation:    Spent more than 45 minutes on total encounter, more than 50 % of the visit was spent counseling and/or coordinating care by the Attending physician.

## 2020-12-02 NOTE — PROGRESS NOTE ADULT - SUBJECTIVE AND OBJECTIVE BOX
Patient was seen and examined  Patient is a 65y old  Female who presents with a chief complaint of Hypotension (02 Dec 2020 11:36)      INTERVAL HPI/OVERNIGHT EVENTS:  T(C): 36.8 (12-02-20 @ 13:00), Max: 36.8 (12-01-20 @ 20:58)  HR: 93 (12-02-20 @ 13:00) (93 - 101)  BP: 110/70 (12-02-20 @ 13:00) (110/65 - 113/71)  RR: 18 (12-02-20 @ 13:00) (18 - 18)  SpO2: 100% (12-02-20 @ 13:00) (100% - 100%)  Wt(kg): --  I&O's Summary      LABS:                        8.8    5.74  )-----------( 283      ( 02 Dec 2020 07:42 )             29.3     12-02    147<H>  |  109<H>  |  16  ----------------------------<  80  3.7   |  34<H>  |  0.74    Ca    8.6      02 Dec 2020 07:42  Phos  3.1     12-02  Mg     1.9     12-02    TPro  6.6  /  Alb  2.1<L>  /  TBili  1.6<H>  /  DBili  x   /  AST  74<H>  /  ALT  53  /  AlkPhos  304<H>  12-02        CAPILLARY BLOOD GLUCOSE      POCT Blood Glucose.: 114 mg/dL (02 Dec 2020 11:56)  POCT Blood Glucose.: 149 mg/dL (02 Dec 2020 00:06)              MEDICATIONS  (STANDING):  ascorbic acid 500 milliGRAM(s) Oral two times a day  chlorhexidine 2% Cloths 1 Application(s) Topical daily  chlorhexidine 2% Cloths 1 Application(s) Topical daily  DAPTOmycin IVPB 350 milliGRAM(s) IV Intermittent every 24 hours  enoxaparin Injectable 50 milliGRAM(s) SubCutaneous daily  fluconAZOLE IVPB 200 milliGRAM(s) IV Intermittent every 24 hours  fluconAZOLE IVPB      furosemide    Tablet 20 milliGRAM(s) Oral daily  meropenem  IVPB 500 milliGRAM(s) IV Intermittent every 8 hours  multivitamin/minerals 1 Tablet(s) Oral daily  mupirocin 2% Nasal 1 Application(s) Nasal two times a day  nystatin Powder 1 Application(s) Topical two times a day  pantoprazole   Suspension 40 milliGRAM(s) Enteral Tube daily  rifAXIMin 550 milliGRAM(s) Oral two times a day  zinc sulfate 220 milliGRAM(s) Oral daily    MEDICATIONS  (PRN):  acetaminophen   Tablet .. 650 milliGRAM(s) Oral every 6 hours PRN Temp greater or equal to 38C (100.4F)  ALBUTerol    90 MICROgram(s) HFA Inhaler 2 Puff(s) Inhalation every 6 hours PRN Shortness of Breath and/or Wheezing  sodium chloride 0.9% lock flush 10 milliLiter(s) IV Push every 1 hour PRN Pre/post blood products, medications, blood draw, and to maintain line patency      RADIOLOGY & ADDITIONAL TESTS:    Imaging Personally Reviewed:  [ ] YES  [ ] NO    REVIEW OF SYSTEMS:  CONSTITUTIONAL: No fever, weight loss, or fatigue  EYES: No eye pain, visual disturbances, or discharge  ENMT:  No difficulty hearing, tinnitus, vertigo; No sinus or throat pain  NECK: No pain or stiffness  BREASTS: No pain, masses, or nipple discharge  RESPIRATORY: No cough, wheezing, chills or hemoptysis; No shortness of breath  CARDIOVASCULAR: No chest pain, palpitations, dizziness, or leg swelling  GASTROINTESTINAL: No abdominal or epigastric pain. No nausea, vomiting, or hematemesis; No diarrhea or constipation. No melena or hematochezia.  GENITOURINARY: No dysuria, frequency, hematuria, or incontinence  NEUROLOGICAL: No headaches, memory loss, loss of strength, numbness, or tremors  SKIN: No itching, burning, rashes, or lesions   LYMPH NODES: No enlarged glands  ENDOCRINE: No heat or cold intolerance; No hair loss  MUSCULOSKELETAL: No joint pain or swelling; No muscle, back, or extremity pain  PSYCHIATRIC: No depression, anxiety, mood swings, or difficulty sleeping  HEME/LYMPH: No easy bruising, or bleeding gums  ALLERY AND IMMUNOLOGIC: No hives or eczema      Consultant(s) Notes Reviewed:  [x} YES  [ ] NO    PHYSICAL EXAM:  GENERAL: NAD, well-groomed, well-developed  HEAD:  Atraumatic, Normocephalic  EYES: EOMI, PERRLA, conjunctiva and sclera clear  ENMT: No tonsillar erythema, exudates, or enlargement; Moist mucous membranes, Good dentition, No lesions  NECK: Supple, No JVD, Normal thyroid  NERVOUS SYSTEM:  Alert & Oriented X3, Good concentration; Motor Strength 5/5 B/L upper and lower extremities; DTRs 2+ intact and symmetric  CHEST/LUNG: Clear to percussion bilaterally; No rales, rhonchi, wheezing, or rubs  HEART: Regular rate and rhythm; No murmurs, rubs, or gallops  ABDOMEN: Soft, Nontender, Nondistended; Bowel sounds present  EXTREMITIES:  2+ Peripheral Pulses, No clubbing, cyanosis, or edema  LYMPH: No lymphadenopathy noted  SKIN: No rashes or lesions    Care Discussed with Consultants/Other Providers [ x] YES  [ ] NO

## 2020-12-02 NOTE — PROGRESS NOTE ADULT - ASSESSMENT
65y Female from home, lives with daughter, ambulates with walker with PMH of recurrent SBO's, s/p exp lap, SB resection in 2015, ex lap, LABINA in 2018, DVT, PE, on Xarelto, IVC filter, chronic leg swelling, and anasarca, came in to the ED due to hypotension during office visit. Patient was found to be bacteremic with bacteroids fragilis. Admitted in ICU due to hypotension and hypothermia. patient completed course of antibiotics . BCx X2 negative. Ammonia level elevated and patient refused NGT placement, mental status deteriorated and patient desaturated to high 70%, patient got intubated on 10/24 . Sputum was positive for MRSA and Stenotrophomonas. patient started on vancomycin and Levaquin. patient was given lactulose for high ammonia level. kidney function and liver function started to deteriorate and patient was leaning toward multi organ failure . high dose of Lasix started and patient was aggressively diuresed. Kidney function improved . Ammonia level dropped to less than 10.  Mental status improved and patient extubated on 11/3/2020. Patient was seen by wound specialist and decubitus ulcer debrided. patient had a drop in h/h s/p debridement . received 1 unit PRBC .     11/12  Transferred from ICU to SCU  11/17  Midodrine d/c secondary to daptomycin being started.  11/18 Febrile, Tmax 101.5. Repeat BC sent.   11/19 Discontinue velazquez.   11/20 febrile overnight.   11/22 s/p 2 units PRBC for Hgb 6.4  11/24 MRI pelvis (+) OM  11/25 PAVAN no endocarditis. Awaiting PICC  11/27 Spiked fever of 101F; Repeated cultures, chest xray, lactate, Follow up results. IR Denies PICC Placement due to fever. Plan for IR Monday (NPO Sunday) if afebrile over the weekend. Given one dose of lasix IV today for pleural effusion on chest xray; Give one more dose of IV Lasix 40mg tomorrow at 2pm if BP stable. Continue with daily lasix.

## 2020-12-02 NOTE — PROGRESS NOTE ADULT - PROBLEM SELECTOR PLAN 1
Secondary to bacteremia . Bacteroides fragilis per BC on 10/13  Sputum positive for MRSA and Stenotrophomonas  Aspiration pneumonia vs HCAP .   Repeat BC 10/16 and 11/19 NGTD ,UC on 11/20 NGTD   Infected sacral ulcer- s/p debridement and culture grew Enterococcus (VRE) and Candida ( sensitivity is pending)  Leukocytosis resolved, Cdiff negative  MRI sacrum/pelvis shows OM   c/w abx per ID   `PICC  for extended abx (for total of 6 weeks, thru Dec 28th). Placed today in IR.   Needs Dapto until 12/28. May be switched to Linezolid for the last 2 weeks of treatment as per Dr Patricio

## 2020-12-02 NOTE — PROGRESS NOTE ADULT - PROBLEM SELECTOR PLAN 2
Echo shows improved EF   s/p IV Lasix 11/27 for pleural effusions  Currently appears euvolemic  Continue supportive care.

## 2020-12-02 NOTE — PROGRESS NOTE ADULT - PROBLEM SELECTOR PLAN 9
DVT and GI prophylaxis  Continue rifaximin  Dapto for total of 6 weeks with end date 12/28/2020 (11/17 - 12/28/2020  S/P PICC line placement Monday.  Discharge planning underway.

## 2020-12-02 NOTE — PROGRESS NOTE ADULT - NUTRITIONAL ASSESSMENT
This patient has been assessed with a concern for Malnutrition and has been determined to have a diagnosis/diagnoses of Severe protein-calorie malnutrition.    This patient is being managed with:   Diet Dysphagia 1 Pureed-Thin Liquids-  Low Sodium  Supplement Feeding Modality:  Oral  Two Papi HN Cans or Servings Per Day:  2       Frequency:  Two Times a day  Ensure Pudding Cans or Servings Per Day:  2       Frequency:  Two Times a day  Entered: Dec  2 2020 11:57AM

## 2020-12-03 NOTE — PROGRESS NOTE ADULT - SUBJECTIVE AND OBJECTIVE BOX
Patient is seen and examined at the bed side, remains afebrile.        REVIEW OF SYSTEMS: All other review systems are negative      ALLERGIES: No Known Allergies      Vital Signs Last 24 Hrs  T(C): 37.9 (03 Dec 2020 05:10), Max: 37.9 (03 Dec 2020 05:10)  T(F): 100.3 (03 Dec 2020 05:10), Max: 100.3 (03 Dec 2020 05:10)  HR: 101 (03 Dec 2020 05:10) (93 - 101)  BP: 111/66 (03 Dec 2020 05:10) (104/63 - 111/66)  BP(mean): --  RR: 17 (03 Dec 2020 05:10) (16 - 18)  SpO2: 100% (03 Dec 2020 05:10) (100% - 100%)        PHYSICAL EXAM:  GENERAL: Not in distress, on oxygen via NC  CHEST/LUNG: Not using accessory muscles   HEART: s1 and s2 present  ABDOMEN:  Nontender and  Nondistended  EXTREMITIES: B/L UE edematous improved  CNS: Awake and Alert         LABS:                        8.2    5.35  )-----------( 297      ( 03 Dec 2020 06:51 )             27.7                  9.9    9.41  )-----------( 269      ( 24 Nov 2020 06:38 )             31.5                           9.4    13.14 )-----------( 149      ( 14 Nov 2020 07:58 )             28.7         12-03    148<H>  |  110<H>  |  14  ----------------------------<  74  3.7   |  34<H>  |  0.61    Ca    8.3<L>      03 Dec 2020 06:51  Phos  3.2     12-03  Mg     1.9     12-03    TPro  6.3  /  Alb  1.9<L>  /  TBili  1.6<H>  /  DBili  x   /  AST  74<H>  /  ALT  53  /  AlkPhos  271<H>  12-03    12-02    147<H>  |  109<H>  |  16  ----------------------------<  80  3.7   |  34<H>  |  0.74    Ca    8.6      02 Dec 2020 07:42  Phos  3.1     12-02  Mg     1.9     12-02    TPro  6.6  /  Alb  2.1<L>  /  TBili  1.6<H>  /  DBili  x   /  AST  74<H>  /  ALT  53  /  AlkPhos  304<H>  12-02 11-06    138  |  104  |  37<H>  ----------------------------<  81  3.9   |  25  |  2.37<H>    Ca    8.1<L>      06 Nov 2020 06:20  Phos  3.4     11-06  Mg     2.0     11-06    TPro  5.1<L>  /  Alb  2.8<L>  /  TBili  9.2<H>  /  DBili  x   /  AST  233<H>  /  ALT  99<H>  /  AlkPhos  96  11-06      Vancomycin Level, Trough (11.07.20 @ 21:49)   Vancomycin Level, Trough: 16.1:     Vancomycin Level, Trough (11.07.20 @ 07:08)   Vancomycin Level, Trough: 19.5    Vancomycin Level, Trough (11.06.20 @ 06:20)   Vancomycin Level, Trough: 19.9:       Procalcitonin, Serum (11.28.20 @ 15:38)   Procalcitonin, Serum: 1.55:       MEDICATIONS  (STANDING):  ascorbic acid 500 milliGRAM(s) Oral two times a day  chlorhexidine 2% Cloths 1 Application(s) Topical daily  DAPTOmycin IVPB 350 milliGRAM(s) IV Intermittent every 24 hours  enoxaparin Injectable 50 milliGRAM(s) SubCutaneous daily  fluconAZOLE IVPB 200 milliGRAM(s) IV Intermittent every 24 hours  fluconAZOLE IVPB      furosemide    Tablet 20 milliGRAM(s) Oral daily  meropenem  IVPB 500 milliGRAM(s) IV Intermittent every 8 hours  multivitamin/minerals 1 Tablet(s) Oral daily  mupirocin 2% Nasal 1 Application(s) Nasal two times a day  nystatin Powder 1 Application(s) Topical two times a day  pantoprazole   Suspension 40 milliGRAM(s) Enteral Tube daily  rifAXIMin 550 milliGRAM(s) Oral two times a day  zinc sulfate 220 milliGRAM(s) Oral daily        RADIOLOGY & ADDITIONAL TESTS:    11/27/20 : Xray Chest 1 View- PORTABLE-Urgent (Xray Chest 1 View- PORTABLE-Urgent .) (11.27.20 @ 06:53) Increased interstitial lung markings with bilateral consolidations right greater than left. Small right pleural effusion. Overall worsening compared to prior exam.      11/24/20 : MR Pelvis Bony Only No Cont (11.24.20 @ 18:02) : Osteomyelitis of the lower sacrum and coccyx with overlying cutaneous ulceration,      11/19/20 : Xray Chest 1 View- PORTABLE-Urgent (Xray Chest 1 View- PORTABLE-Urgent .) (11.19.20 @ 23:53): Improvement in bilateral airspace disease with persistent bibasilar consolidation and small effusions.    11/4/20 : Xray Chest 1 View- PORTABLE-Routine (Xray Chest 1 View- PORTABLE-Routine in AM.) (11.04.20 @ 10:59) Reexpansion of the left lung with residual left pleural effusion and/or infiltrate.  Right pulmonary edema unchanged.  Tubes and catheters in satisfactory position.      10/25/20: Xray Chest 1 View- PORTABLE-Routine (Xray Chest 1 View- PORTABLE-Routine in AM.) (10.25.20 @ 09:21) Frontal expiratory view of the chest shows the heart to be similar in size. Endotracheal tube, right jugular line and feeding tube remain present. The lungs show similar left upper lobe infiltrate with progression of right perihilar infiltrate. Pleural effusions are similar. There is no evidence of pneumothorax.    10/21/20 : CT Abdomen and Pelvis w/ Oral Cont and w/ IV Cont (10.21.20 @ 15:59) Mural thickening of the left colon and rectum. Liquid stool in the colon. Apparent segmental mural thickening of the mid to distal small bowel and the proximal duodenum. Findings may represent nonspecific enterocolitis, noninfectious inflammatory bowel disease, or ischemic bowel. Clinical correlation is recommended. The celiac axis artery, SMA, and KINA are patent without stenosis.    Dilatation of the mid small bowel is again noted. Oral contrast has reached the terminal ileum. Findings may represent small bowel obstruction, or ileus related to nonspecific enterocolitis.   Cholelithiasis. Moderate to large ascites in the abdomen, increased since the previous examination. 5 mm nonspecific noncalcified left upper lobe lung nodule; if the patient's is in the high risk category (i.e. smoker), follow-up chest CT may be pursued in 12 months to ensure stability. Combination of atelectasis and consolidation in the left lower lobe.  Possible 1.0 cm hypodense lesion in the left lobe of the thyroid. Thyroid ultrasound may be pursued for further evaluation.   Mild bilateral pleural effusions.  Aging determinate compression fracture at T5 vertebra.        MICROBIOLOGY DATA:    MRSA/MSSA PCR (11.28.20 @ 11:34)   MRSA PCR Result.: Detected:     Urine Microscopic-Add On (NC) (11.20.20 @ 04:12)   Red Blood Cell - Urine: 5-10 /HPF   White Blood Cell - Urine: 11-25 /HPF   Calcium Oxalate Crystals: Few /HPF   Bacteria: Moderate /HPF   Comment - Urine: few amorphous urates   Epithelial Cells: Few /HPF     Culture - Blood (11.19.20 @ 10:19)   Specimen Source: .Blood Blood-Peripheral   Culture Results: No growth to date.     Culture - Surgical Swab (11.12.20 @ 05:01)   - Ampicillin: R >8 Predicts results to ampicillin/sulbactam, amoxacillin-clavulanate and piperacillin-tazobactam.   - Levofloxacin: R >4   - Linezolid: S 1   - Tetra/Doxy: R >8   - Vancomycin: R >16   Specimen Source: .Surgical Swab Sacral decub   Culture Results:   Few Enterococcus faecium (vancomycin resistant)   Rare Candida albicans "Susceptibilities not performed"   Organism Identification: Enterococcus faecium (vancomycin resistant)   Organism: Enterococcus faecium (vancomycin resistant)   Method Type: STEWART     Culture - Surgical Swab (11.12.20 @ 05:01)   Specimen Source: .Surgical Swab Sacral decub   Culture Results:  Few Enterococcus faecium Susceptibility to follow.   Rare Candida albicans "Susceptibilities not performed"     Culture - Sputum . (10.26.20 @ 00:26)   - Ampicillin/Sulbactam: R <=8/4   - Cefazolin: R >16   - Ceftazidime: I 16   - Clindamycin: R >4   - Erythromycin: R >4   - Gentamicin: S <=1 Should not be used as monotherapy   - Levofloxacin: S <=0.5   - Linezolid: S 2   - Oxacillin: R >2   - Penicillin: R >8   - RIF- Rifampin: S <=1 Should not be used as monotherapy   - Tetra/Doxy: S <=1   - Trimethoprim/Sulfamethoxazole: S <=0.5/9.5   - Trimethoprim/Sulfamethoxazole: S <=0.5/9.5   - Vancomycin: S 1     MRSA/MSSA PCR (10.21.20 @ 09:41)   MRSA PCR Result.: Detected:       Culture - Blood (10.20.20 @ 22:09)   Specimen Source: .Blood Blood   Culture Results:  No growth to date.     Culture - Blood (10.20.20 @ 22:09)   Specimen Source: .Blood Blood   Culture Results:   No growth to date.     Culture - Blood in AM (10.16.20 @ 10:13)   Specimen Source: .Blood Blood-Peripheral   Culture Results: No growth to date.     Culture - Blood in AM (10.16.20 @ 10:13)   Specimen Source: .Blood Blood-Peripheral   Culture Results: No growth to date.     Culture - Blood (10.13.20 @ 22:23)   Growth in anaerobic bottle: Gram Negative Rods   Specimen Source: .Blood Blood-Peripheral   Organism: Blood Culture PCR   Culture Results: Growth in anaerobic bottle: Bacteroides fragilis   "Susceptibilities not performed"     Culture - Blood (10.13.20 @ 22:23)   Specimen Source: .Blood Blood-Peripheral   Culture Results:   No growth to date.                Patient is seen and examined at the bed side, remains afebrile.  She is tolerating Abx well.      REVIEW OF SYSTEMS: All other review systems are negative      ALLERGIES: No Known Allergies      Vital Signs Last 24 Hrs  T(C): 37.9 (03 Dec 2020 05:10), Max: 37.9 (03 Dec 2020 05:10)  T(F): 100.3 (03 Dec 2020 05:10), Max: 100.3 (03 Dec 2020 05:10)  HR: 101 (03 Dec 2020 05:10) (93 - 101)  BP: 111/66 (03 Dec 2020 05:10) (104/63 - 111/66)  BP(mean): --  RR: 17 (03 Dec 2020 05:10) (16 - 18)  SpO2: 100% (03 Dec 2020 05:10) (100% - 100%)        PHYSICAL EXAM:  GENERAL: Not in distress, on oxygen via NC  CHEST/LUNG: Not using accessory muscles   HEART: s1 and s2 present  ABDOMEN:  Nontender and  Nondistended  EXTREMITIES: B/L UE edematous improved  CNS: Awake and Alert         LABS:                        8.2    5.35  )-----------( 297      ( 03 Dec 2020 06:51 )             27.7                  9.9    9.41  )-----------( 269      ( 24 Nov 2020 06:38 )             31.5                           9.4    13.14 )-----------( 149      ( 14 Nov 2020 07:58 )             28.7         12-03    148<H>  |  110<H>  |  14  ----------------------------<  74  3.7   |  34<H>  |  0.61    Ca    8.3<L>      03 Dec 2020 06:51  Phos  3.2     12-03  Mg     1.9     12-03    TPro  6.3  /  Alb  1.9<L>  /  TBili  1.6<H>  /  DBili  x   /  AST  74<H>  /  ALT  53  /  AlkPhos  271<H>  12-03    12-02    147<H>  |  109<H>  |  16  ----------------------------<  80  3.7   |  34<H>  |  0.74    Ca    8.6      02 Dec 2020 07:42  Phos  3.1     12-02  Mg     1.9     12-02    TPro  6.6  /  Alb  2.1<L>  /  TBili  1.6<H>  /  DBili  x   /  AST  74<H>  /  ALT  53  /  AlkPhos  304<H>  12-02      11-06    138  |  104  |  37<H>  ----------------------------<  81  3.9   |  25  |  2.37<H>    Ca    8.1<L>      06 Nov 2020 06:20  Phos  3.4     11-06  Mg     2.0     11-06    TPro  5.1<L>  /  Alb  2.8<L>  /  TBili  9.2<H>  /  DBili  x   /  AST  233<H>  /  ALT  99<H>  /  AlkPhos  96  11-06      Vancomycin Level, Trough (11.07.20 @ 21:49)   Vancomycin Level, Trough: 16.1:     Vancomycin Level, Trough (11.07.20 @ 07:08)   Vancomycin Level, Trough: 19.5    Vancomycin Level, Trough (11.06.20 @ 06:20)   Vancomycin Level, Trough: 19.9:       Procalcitonin, Serum (11.28.20 @ 15:38)   Procalcitonin, Serum: 1.55:       MEDICATIONS  (STANDING):  ascorbic acid 500 milliGRAM(s) Oral two times a day  chlorhexidine 2% Cloths 1 Application(s) Topical daily  DAPTOmycin IVPB 350 milliGRAM(s) IV Intermittent every 24 hours  enoxaparin Injectable 50 milliGRAM(s) SubCutaneous daily  fluconAZOLE IVPB 200 milliGRAM(s) IV Intermittent every 24 hours  fluconAZOLE IVPB      furosemide    Tablet 20 milliGRAM(s) Oral daily  meropenem  IVPB 500 milliGRAM(s) IV Intermittent every 8 hours  multivitamin/minerals 1 Tablet(s) Oral daily  mupirocin 2% Nasal 1 Application(s) Nasal two times a day  nystatin Powder 1 Application(s) Topical two times a day  pantoprazole   Suspension 40 milliGRAM(s) Enteral Tube daily  rifAXIMin 550 milliGRAM(s) Oral two times a day  zinc sulfate 220 milliGRAM(s) Oral daily        RADIOLOGY & ADDITIONAL TESTS:    11/27/20 : Xray Chest 1 View- PORTABLE-Urgent (Xray Chest 1 View- PORTABLE-Urgent .) (11.27.20 @ 06:53) Increased interstitial lung markings with bilateral consolidations right greater than left. Small right pleural effusion. Overall worsening compared to prior exam.      11/24/20 : MR Pelvis Bony Only No Cont (11.24.20 @ 18:02) : Osteomyelitis of the lower sacrum and coccyx with overlying cutaneous ulceration,      11/19/20 : Xray Chest 1 View- PORTABLE-Urgent (Xray Chest 1 View- PORTABLE-Urgent .) (11.19.20 @ 23:53): Improvement in bilateral airspace disease with persistent bibasilar consolidation and small effusions.    11/4/20 : Xray Chest 1 View- PORTABLE-Routine (Xray Chest 1 View- PORTABLE-Routine in AM.) (11.04.20 @ 10:59) Reexpansion of the left lung with residual left pleural effusion and/or infiltrate.  Right pulmonary edema unchanged.  Tubes and catheters in satisfactory position.      10/25/20: Xray Chest 1 View- PORTABLE-Routine (Xray Chest 1 View- PORTABLE-Routine in AM.) (10.25.20 @ 09:21) Frontal expiratory view of the chest shows the heart to be similar in size. Endotracheal tube, right jugular line and feeding tube remain present. The lungs show similar left upper lobe infiltrate with progression of right perihilar infiltrate. Pleural effusions are similar. There is no evidence of pneumothorax.    10/21/20 : CT Abdomen and Pelvis w/ Oral Cont and w/ IV Cont (10.21.20 @ 15:59) Mural thickening of the left colon and rectum. Liquid stool in the colon. Apparent segmental mural thickening of the mid to distal small bowel and the proximal duodenum. Findings may represent nonspecific enterocolitis, noninfectious inflammatory bowel disease, or ischemic bowel. Clinical correlation is recommended. The celiac axis artery, SMA, and KINA are patent without stenosis.    Dilatation of the mid small bowel is again noted. Oral contrast has reached the terminal ileum. Findings may represent small bowel obstruction, or ileus related to nonspecific enterocolitis.   Cholelithiasis. Moderate to large ascites in the abdomen, increased since the previous examination. 5 mm nonspecific noncalcified left upper lobe lung nodule; if the patient's is in the high risk category (i.e. smoker), follow-up chest CT may be pursued in 12 months to ensure stability. Combination of atelectasis and consolidation in the left lower lobe.  Possible 1.0 cm hypodense lesion in the left lobe of the thyroid. Thyroid ultrasound may be pursued for further evaluation.   Mild bilateral pleural effusions.  Aging determinate compression fracture at T5 vertebra.        MICROBIOLOGY DATA:    MRSA/MSSA PCR (11.28.20 @ 11:34)   MRSA PCR Result.: Detected:     Urine Microscopic-Add On (NC) (11.20.20 @ 04:12)   Red Blood Cell - Urine: 5-10 /HPF   White Blood Cell - Urine: 11-25 /HPF   Calcium Oxalate Crystals: Few /HPF   Bacteria: Moderate /HPF   Comment - Urine: few amorphous urates   Epithelial Cells: Few /HPF     Culture - Blood (11.19.20 @ 10:19)   Specimen Source: .Blood Blood-Peripheral   Culture Results: No growth to date.     Culture - Surgical Swab (11.12.20 @ 05:01)   - Ampicillin: R >8 Predicts results to ampicillin/sulbactam, amoxacillin-clavulanate and piperacillin-tazobactam.   - Levofloxacin: R >4   - Linezolid: S 1   - Tetra/Doxy: R >8   - Vancomycin: R >16   Specimen Source: .Surgical Swab Sacral decub   Culture Results:   Few Enterococcus faecium (vancomycin resistant)   Rare Candida albicans "Susceptibilities not performed"   Organism Identification: Enterococcus faecium (vancomycin resistant)   Organism: Enterococcus faecium (vancomycin resistant)   Method Type: STEWART     Culture - Surgical Swab (11.12.20 @ 05:01)   Specimen Source: .Surgical Swab Sacral decub   Culture Results:  Few Enterococcus faecium Susceptibility to follow.   Rare Candida albicans "Susceptibilities not performed"     Culture - Sputum . (10.26.20 @ 00:26)   - Ampicillin/Sulbactam: R <=8/4   - Cefazolin: R >16   - Ceftazidime: I 16   - Clindamycin: R >4   - Erythromycin: R >4   - Gentamicin: S <=1 Should not be used as monotherapy   - Levofloxacin: S <=0.5   - Linezolid: S 2   - Oxacillin: R >2   - Penicillin: R >8   - RIF- Rifampin: S <=1 Should not be used as monotherapy   - Tetra/Doxy: S <=1   - Trimethoprim/Sulfamethoxazole: S <=0.5/9.5   - Trimethoprim/Sulfamethoxazole: S <=0.5/9.5   - Vancomycin: S 1     MRSA/MSSA PCR (10.21.20 @ 09:41)   MRSA PCR Result.: Detected:       Culture - Blood (10.20.20 @ 22:09)   Specimen Source: .Blood Blood   Culture Results:  No growth to date.     Culture - Blood (10.20.20 @ 22:09)   Specimen Source: .Blood Blood   Culture Results:   No growth to date.     Culture - Blood in AM (10.16.20 @ 10:13)   Specimen Source: .Blood Blood-Peripheral   Culture Results: No growth to date.     Culture - Blood in AM (10.16.20 @ 10:13)   Specimen Source: .Blood Blood-Peripheral   Culture Results: No growth to date.     Culture - Blood (10.13.20 @ 22:23)   Growth in anaerobic bottle: Gram Negative Rods   Specimen Source: .Blood Blood-Peripheral   Organism: Blood Culture PCR   Culture Results: Growth in anaerobic bottle: Bacteroides fragilis   "Susceptibilities not performed"     Culture - Blood (10.13.20 @ 22:23)   Specimen Source: .Blood Blood-Peripheral   Culture Results:   No growth to date.

## 2020-12-03 NOTE — PROGRESS NOTE ADULT - SUBJECTIVE AND OBJECTIVE BOX
Patient was seen and examined  Patient is a 65y old  Female who presents with a chief complaint of Hypotension (02 Dec 2020 15:55)      INTERVAL HPI/OVERNIGHT EVENTS:  T(C): 37.9 (12-03-20 @ 05:10), Max: 37.9 (12-03-20 @ 05:10)  HR: 101 (12-03-20 @ 05:10) (93 - 101)  BP: 111/66 (12-03-20 @ 05:10) (104/63 - 111/66)  RR: 17 (12-03-20 @ 05:10) (16 - 18)  SpO2: 100% (12-03-20 @ 05:10) (100% - 100%)  Wt(kg): --  I&O's Summary      LABS:                        8.2    5.35  )-----------( 297      ( 03 Dec 2020 06:51 )             27.7     12-03    148<H>  |  110<H>  |  14  ----------------------------<  74  3.7   |  34<H>  |  0.61    Ca    8.3<L>      03 Dec 2020 06:51  Phos  3.2     12-03  Mg     1.9     12-03    TPro  6.3  /  Alb  1.9<L>  /  TBili  1.6<H>  /  DBili  x   /  AST  74<H>  /  ALT  53  /  AlkPhos  271<H>  12-03        CAPILLARY BLOOD GLUCOSE      POCT Blood Glucose.: 81 mg/dL (03 Dec 2020 06:27)  POCT Blood Glucose.: 98 mg/dL (03 Dec 2020 00:00)  POCT Blood Glucose.: 90 mg/dL (02 Dec 2020 17:00)  POCT Blood Glucose.: 114 mg/dL (02 Dec 2020 11:56)              MEDICATIONS  (STANDING):  ascorbic acid 500 milliGRAM(s) Oral two times a day  chlorhexidine 2% Cloths 1 Application(s) Topical daily  chlorhexidine 2% Cloths 1 Application(s) Topical daily  DAPTOmycin IVPB 350 milliGRAM(s) IV Intermittent every 24 hours  enoxaparin Injectable 50 milliGRAM(s) SubCutaneous daily  fluconAZOLE IVPB 200 milliGRAM(s) IV Intermittent every 24 hours  fluconAZOLE IVPB      furosemide    Tablet 20 milliGRAM(s) Oral daily  meropenem  IVPB 500 milliGRAM(s) IV Intermittent every 8 hours  multivitamin/minerals 1 Tablet(s) Oral daily  mupirocin 2% Nasal 1 Application(s) Nasal two times a day  nystatin Powder 1 Application(s) Topical two times a day  pantoprazole   Suspension 40 milliGRAM(s) Enteral Tube daily  rifAXIMin 550 milliGRAM(s) Oral two times a day  zinc sulfate 220 milliGRAM(s) Oral daily    MEDICATIONS  (PRN):  acetaminophen   Tablet .. 650 milliGRAM(s) Oral every 6 hours PRN Temp greater or equal to 38C (100.4F)  ALBUTerol    90 MICROgram(s) HFA Inhaler 2 Puff(s) Inhalation every 6 hours PRN Shortness of Breath and/or Wheezing  sodium chloride 0.9% lock flush 10 milliLiter(s) IV Push every 1 hour PRN Pre/post blood products, medications, blood draw, and to maintain line patency      RADIOLOGY & ADDITIONAL TESTS:    Imaging Personally Reviewed:  [ ] YES  [ ] NO    REVIEW OF SYSTEMS:  CONSTITUTIONAL: No fever, weight loss, or fatigue  EYES: No eye pain, visual disturbances, or discharge  ENMT:  No difficulty hearing, tinnitus, vertigo; No sinus or throat pain  NECK: No pain or stiffness  BREASTS: No pain, masses, or nipple discharge  RESPIRATORY: No cough, wheezing, chills or hemoptysis; No shortness of breath  CARDIOVASCULAR: No chest pain, palpitations, dizziness, or leg swelling  GASTROINTESTINAL: No abdominal or epigastric pain. No nausea, vomiting, or hematemesis; No diarrhea or constipation. No melena or hematochezia.  GENITOURINARY: No dysuria, frequency, hematuria, or incontinence  NEUROLOGICAL: No headaches, memory loss, loss of strength, numbness, or tremors  SKIN: No itching, burning, rashes, or lesions   LYMPH NODES: No enlarged glands  ENDOCRINE: No heat or cold intolerance; No hair loss  MUSCULOSKELETAL: No joint pain or swelling; No muscle, back, or extremity pain  PSYCHIATRIC: No depression, anxiety, mood swings, or difficulty sleeping  HEME/LYMPH: No easy bruising, or bleeding gums  ALLERY AND IMMUNOLOGIC: No hives or eczema      Consultant(s) Notes Reviewed:  [ x ] YES  [ ] NO    PHYSICAL EXAM:  GENERAL: NAD, well-groomed, well-developed  HEAD:  Atraumatic, Normocephalic  EYES: EOMI, PERRLA, conjunctiva and sclera clear  ENMT: No tonsillar erythema, exudates, or enlargement; Moist mucous membranes, Good dentition, No lesions  NECK: Supple, No JVD, Normal thyroid  NERVOUS SYSTEM:  Alert & Oriented X3, Good concentration; Motor Strength 5/5 B/L upper and lower extremities; DTRs 2+ intact and symmetric  CHEST/LUNG: Clear to percussion bilaterally; No rales, rhonchi, wheezing, or rubs  HEART: Regular rate and rhythm; No murmurs, rubs, or gallops  ABDOMEN: Soft, Nontender, Nondistended; Bowel sounds present  EXTREMITIES:  2+ Peripheral Pulses, No clubbing, cyanosis, or edema  LYMPH: No lymphadenopathy noted  SKIN: No rashes or lesions    Care Discussed with Consultants/Other Providers [ x] YES  [ ] NO

## 2020-12-03 NOTE — PROGRESS NOTE ADULT - SUBJECTIVE AND OBJECTIVE BOX
MARIBETH PADILLA    SCU progress note    INTERVAL HPI/OVERNIGHT EVENTS: ***No overnight events    DNR [x ]   DNI  [  ]  May be intubated.    Covid - 19 PCR: Negative 11/30    The 4Ms    What Matters Most: see [facility]  Age appropriate Medications/Screen for High Risk Medication: Yes  Mentation: see CAM below  Mobility: defer to physical exam    The Confusion Assessment Method (CAM) Diagnostic Algorithm     1: Acute Onset or Fluctuating Course  - Is there evidence of an acute change in mental status from the patient’s baseline? Did the (abnormal) behavior  fluctuate during the day, that is, tend to come and go, or increase and decrease in severity?  [ ] YES [x] NO     2: Inattention  - Did the patient have difficulty focusing attention, being easily distractible, or having difficulty keeping track of what was being said?  [ ] YES [ ] NO  At baseline     3: Disorganized thinking  -Was the patient’s thinking disorganized or incoherent, such as rambling or irrelevant conversation, unclear or illogical flow of ideas, or unpredictable switching from subject to subject?  [ ] YES [x ] NO    4: Altered Level of consciousness?  [ ] YES [x ] NO    The diagnosis of delirium by CAM requires the presence of features 1 and 2 and either 3 or 4.    PRESSORS: [ ] YES [x ] NO  DAPTOmycin IVPB 350 milliGRAM(s) IV Intermittent every 24 hours  fluconAZOLE IVPB 200 milliGRAM(s) IV Intermittent every 24 hours  fluconAZOLE IVPB      meropenem  IVPB 500 milliGRAM(s) IV Intermittent every 8 hours  rifAXIMin 550 milliGRAM(s) Oral two times a day    Cardiovascular:  Heart Failure  Acute   Acute on Chronic  Chronic       furosemide    Tablet 20 milliGRAM(s) Oral daily    Pulmonary:  ALBUTerol    90 MICROgram(s) HFA Inhaler 2 Puff(s) Inhalation every 6 hours PRN    Hematalogic:  enoxaparin Injectable 50 milliGRAM(s) SubCutaneous daily    Other:  acetaminophen   Tablet .. 650 milliGRAM(s) Oral every 6 hours PRN  ascorbic acid 500 milliGRAM(s) Oral two times a day  chlorhexidine 2% Cloths 1 Application(s) Topical daily  chlorhexidine 2% Cloths 1 Application(s) Topical daily  multivitamin/minerals 1 Tablet(s) Oral daily  mupirocin 2% Nasal 1 Application(s) Nasal two times a day  nystatin Powder 1 Application(s) Topical two times a day  pantoprazole   Suspension 40 milliGRAM(s) Enteral Tube daily  sodium chloride 0.9% lock flush 10 milliLiter(s) IV Push every 1 hour PRN  zinc sulfate 220 milliGRAM(s) Oral daily    acetaminophen   Tablet .. 650 milliGRAM(s) Oral every 6 hours PRN  ALBUTerol    90 MICROgram(s) HFA Inhaler 2 Puff(s) Inhalation every 6 hours PRN  ascorbic acid 500 milliGRAM(s) Oral two times a day  chlorhexidine 2% Cloths 1 Application(s) Topical daily  chlorhexidine 2% Cloths 1 Application(s) Topical daily  DAPTOmycin IVPB 350 milliGRAM(s) IV Intermittent every 24 hours  enoxaparin Injectable 50 milliGRAM(s) SubCutaneous daily  fluconAZOLE IVPB 200 milliGRAM(s) IV Intermittent every 24 hours  fluconAZOLE IVPB      furosemide    Tablet 20 milliGRAM(s) Oral daily  meropenem  IVPB 500 milliGRAM(s) IV Intermittent every 8 hours  multivitamin/minerals 1 Tablet(s) Oral daily  mupirocin 2% Nasal 1 Application(s) Nasal two times a day  nystatin Powder 1 Application(s) Topical two times a day  pantoprazole   Suspension 40 milliGRAM(s) Enteral Tube daily  rifAXIMin 550 milliGRAM(s) Oral two times a day  sodium chloride 0.9% lock flush 10 milliLiter(s) IV Push every 1 hour PRN  zinc sulfate 220 milliGRAM(s) Oral daily    Drug Dosing Weight  Height (cm): 167.6 (21 Oct 2020 02:26)  Weight (kg): 56.2 (21 Oct 2020 02:26)  BMI (kg/m2): 20 (21 Oct 2020 02:26)  BSA (m2): 1.63 (21 Oct 2020 02:26)    CENTRAL LINE: [ ] YES [x ] NO  LOCATION:   DATE INSERTED:  REMOVE: [ ] YES [ ] NO  EXPLAIN:    TREJO: [ ] YES [x ] NO    DATE INSERTED:  REMOVE:  [ ] YES [ ] NO  EXPLAIN:    PAST MEDICAL & SURGICAL HISTORY:  Anasarca    Pulmonary embolism    DVT (deep venous thrombosis)    SBO (small bowel obstruction)    H/O exploratory laparotomy                      PHYSICAL EXAM:    GENERAL: NAD  HEAD:  Atraumatic, Normocephalic  EYES: EOMI, PERRLA, conjunctiva and sclera clear  ENMT: No tonsillar erythema, exudates  NECK: Supple, No JVD  NERVOUS SYSTEM:  Alert & Oriented X2. Follows simple commands. Moving all extremities  CHEST/LUNG: Diminished breath sounds bilateral abses  HEART: Regular rate and rhythm; No murmurs, rubs, or gallops  ABDOMEN: Soft, Nontender, Nondistended; Bowel sounds present  EXTREMITIES:  2+ Peripheral Pulses, No clubbing, cyanosis, or edema  LYMPH: No lymphadenopathy noted  SKIN: Unstageable sacrum      LABS:  CBC Full  -  ( 03 Dec 2020 06:51 )  WBC Count : 5.35 K/uL  RBC Count : 2.86 M/uL  Hemoglobin : 8.2 g/dL  Hematocrit : 27.7 %  Platelet Count - Automated : 297 K/uL  Mean Cell Volume : 96.9 fl  Mean Cell Hemoglobin : 28.7 pg  Mean Cell Hemoglobin Concentration : 29.6 gm/dL  Auto Neutrophil # : 3.50 K/uL  Auto Lymphocyte # : 1.17 K/uL  Auto Monocyte # : 0.42 K/uL  Auto Eosinophil # : 0.18 K/uL  Auto Basophil # : 0.05 K/uL  Auto Neutrophil % : 65.3 %  Auto Lymphocyte % : 21.9 %  Auto Monocyte % : 7.9 %  Auto Eosinophil % : 3.4 %  Auto Basophil % : 0.9 %    12-03    148<H>  |  110<H>  |  14  ----------------------------<  74  3.7   |  34<H>  |  0.61    Ca    8.3<L>      03 Dec 2020 06:51  Phos  3.2     12-03  Mg     1.9     12-03    TPro  6.3  /  Alb  1.9<L>  /  TBili  1.6<H>  /  DBili  x   /  AST  74<H>  /  ALT  53  /  AlkPhos  271<H>  12-03              [  ]  DVT Prophylaxis  [  ]  Nutrition, Brand, Rate         Goal Rate        Abnormal Nutritional Status -  Malnutrition   Cachexia      Morbid Obesity BMI >/=40    RADIOLOGY & ADDITIONAL STUDIES:  ***  < from: Xray Chest 1 View-PORTABLE IMMEDIATE (Xray Chest 1 View-PORTABLE IMMEDIATE .) (11.30.20 @ 15:01) >  Heart is enlarged.    There is atelectatic consolidation of the left lower lobe. There is significant infiltrate in most of the right midlung field with concentration in the right lower lobe with associated effusion. The infiltrate has increased in the right upper lobe compared to November 29.    Present film shows a left PICC line with tip in superior vena cava.    IMPRESSION: Advanced bilateral lung processes somewhat increased in the right upper lobe. Left PICC line inserted.      < end of copied text >    Goals of Care Discussion with Family/Proxy/Other   - see note from  11/18

## 2020-12-03 NOTE — PROGRESS NOTE ADULT - ASSESSMENT
Patient is a 65y old  Female from home, lives with daughter, ambulates with walker with PMH of recurrent SBO's, s/p exp lap, SB resection in 2015, ex lap, ALBINA in 2018, DVT, PE, on Xarelto, IVC filter, chronic leg swelling, and anasarca, Now send in to the ER by her PCP, Dr. Ross, for evaluation of  generalized weakness and hypotension BP of 80/40 during office visit. On admission, she found to have no fever and BP was in lower normal range and no Leukocytosis. The CXR is clear and CT abd/pelvis consistent with Ileus. The ID consult requested to assist with evaluation of infectious etiology of episode of hypotension. Found to have Bacteroides bacteremia and now developed septic shock on Cefepime and Flagyl. Hence transferred to ICU. 10/20/20.    # Hypotension  at office- BP of 80/40 - resolved   #  Bacteroides fragilis Bacteremia - 10/13/20 - ? source most likely GI- Repeat Blood Cxs have NGTD 10/16/20  # Ileus  - h/o recurrent SBO and s/p EXp. LAp  # COVID 19 negative   # Septic shock ( Hypothermia + hypotensive)- transferred to ICU since requiring pressor- source GI, The CT abd/pelvis shows nonspecific enterocolitis, noninfectious inflammatory bowel disease, or ischemic bowel, along with ileus.   # Pneumonia- HCAP vs Aspiration - on CXR 10/24 and CT chest 10/21- sputum Cx grew Methicillin resistant Staphylococcus aureus  and Stenotrophomonas maltophilia.  # S/p extubated 11/9/20  # Infected sacral ulcer- s/p debridement and culture grew Enterococcus, VRE and Candida, sensitivity is pending- The MRI of pelvis shows  Osteomyelitis of the lower sacrum and coccyx with overlying cutaneous ulceration.   # Fever- 11/8 and 11/19- BCX NGTD 11/19 - The CXR shows Improvement in bilateral airspace disease with persistent bibasilar consolidation. She is s/p removal of velazquez catheter and passed TOV.  # HCAP- 11/27/20,  Intermittent fever and CXR shows  bilateral consolidations right greater than left. - The procalcitonin level is 1.55  # s/p PICC line placement 11/30      would recommend:    1. OOB to chair   2. Continue Meropenem X 7 days in total   3. Continue Daptomycin to cover VRE  and  Fluconazole to cover Candida in sacral wound culture X 6  weeks to treat Osteomyelitis, might change to Linezolid for last two weeks of the course  4. Aspiration precaution  5. Frequent repositioning     d/w Covering Cammie POOL     Attending Attestation:    Spent more than 45 minutes on total encounter, more than 50 % of the visit was spent counseling and/or coordinating care by the Attending physician. Patient is a 65y old  Female from home, lives with daughter, ambulates with walker with PMH of recurrent SBO's, s/p exp lap, SB resection in 2015, ex lap, ALBINA in 2018, DVT, PE, on Xarelto, IVC filter, chronic leg swelling, and anasarca, Now send in to the ER by her PCP, Dr. Ross, for evaluation of  generalized weakness and hypotension BP of 80/40 during office visit. On admission, she found to have no fever and BP was in lower normal range and no Leukocytosis. The CXR is clear and CT abd/pelvis consistent with Ileus. The ID consult requested to assist with evaluation of infectious etiology of episode of hypotension. Found to have Bacteroides bacteremia and now developed septic shock on Cefepime and Flagyl. Hence transferred to ICU. 10/20/20.    # Hypotension  at office- BP of 80/40 - resolved   #  Bacteroides fragilis Bacteremia - 10/13/20 - ? source most likely GI- Repeat Blood Cxs have NGTD 10/16/20  # Ileus  - h/o recurrent SBO and s/p EXp. LAp  # COVID 19 negative   # Septic shock ( Hypothermia + hypotensive)- transferred to ICU since requiring pressor- source GI, The CT abd/pelvis shows nonspecific enterocolitis, noninfectious inflammatory bowel disease, or ischemic bowel, along with ileus.   # Pneumonia- HCAP vs Aspiration - on CXR 10/24 and CT chest 10/21- sputum Cx grew Methicillin resistant Staphylococcus aureus  and Stenotrophomonas maltophilia.  # S/p extubated 11/9/20  # Infected sacral ulcer- s/p debridement and culture grew Enterococcus, VRE and Candida, sensitivity is pending- The MRI of pelvis shows  Osteomyelitis of the lower sacrum and coccyx with overlying cutaneous ulceration.   # Fever- 11/8 and 11/19- BCX NGTD 11/19 - The CXR shows Improvement in bilateral airspace disease with persistent bibasilar consolidation. She is s/p removal of velazquez catheter and passed TOV.  # HCAP- 11/27/20,  Intermittent fever and CXR shows  bilateral consolidations right greater than left. - The procalcitonin level is 1.55  # s/p PICC line placement 11/30      would recommend:    1. OOB to chair   2. Continue Meropenem X 7 days in total , until 12/4/20  3. Continue Daptomycin to cover VRE  and  Fluconazole to cover Candida in sacral wound culture X 6  weeks to treat Osteomyelitis, might change to Linezolid for last two weeks of the course  4. Aspiration precaution  5. Frequent repositioning     d/w Covering Cammie POOL and dr. West     Attending Attestation:    Spent more than 45 minutes on total encounter, more than 50 % of the visit was spent counseling and/or coordinating care by the Attending physician.

## 2020-12-04 NOTE — PROGRESS NOTE ADULT - PROBLEM SELECTOR PLAN 3
Probably secondary to portal hypertension- LFTs unchanged and abd exam benign   Continue rifaximin  Hepatology following

## 2020-12-04 NOTE — PROGRESS NOTE ADULT - SUBJECTIVE AND OBJECTIVE BOX
Time of Visit:  Patient seen and examined.     MEDICATIONS  (STANDING):  ascorbic acid 500 milliGRAM(s) Oral two times a day  chlorhexidine 2% Cloths 1 Application(s) Topical daily  DAPTOmycin IVPB 350 milliGRAM(s) IV Intermittent every 24 hours  enoxaparin Injectable 50 milliGRAM(s) SubCutaneous daily  fluconAZOLE IVPB 200 milliGRAM(s) IV Intermittent every 24 hours  fluconAZOLE IVPB      furosemide    Tablet 20 milliGRAM(s) Oral daily  meropenem  IVPB 500 milliGRAM(s) IV Intermittent every 8 hours  multivitamin/minerals 1 Tablet(s) Oral daily  nystatin Powder 1 Application(s) Topical two times a day  pantoprazole   Suspension 40 milliGRAM(s) Enteral Tube daily  rifAXIMin 550 milliGRAM(s) Oral two times a day  zinc sulfate 220 milliGRAM(s) Oral daily      MEDICATIONS  (PRN):  acetaminophen   Tablet .. 650 milliGRAM(s) Oral every 6 hours PRN Temp greater or equal to 38C (100.4F)  ALBUTerol    90 MICROgram(s) HFA Inhaler 2 Puff(s) Inhalation every 6 hours PRN Shortness of Breath and/or Wheezing  sodium chloride 0.9% lock flush 10 milliLiter(s) IV Push every 1 hour PRN Pre/post blood products, medications, blood draw, and to maintain line patency       Medications up to date at time of exam.      PHYSICAL EXAMINATION:  Patient has no new complaints.  GENERAL: The patient is a well-developed, well-nourished, in no apparent distress.     Vital Signs Last 24 Hrs  T(C): 36.7 (04 Dec 2020 13:16), Max: 39.2 (03 Dec 2020 20:40)  T(F): 98.1 (04 Dec 2020 13:16), Max: 102.6 (03 Dec 2020 20:40)  HR: 97 (04 Dec 2020 13:16) (93 - 123)  BP: 136/80 (04 Dec 2020 13:16) (110/70 - 136/80)  BP(mean): --  RR: 18 (04 Dec 2020 13:16) (16 - 20)  SpO2: 100% (04 Dec 2020 13:16) (97% - 100%)   (if applicable)    Chest Tube (if applicable)    HEENT: Head is normocephalic and atraumatic. Extraocular muscles are intact. Mucous membranes are moist.     NECK: Supple, no palpable adenopathy.    LUNGS: Clear to auscultation, no wheezing, rales, or rhonchi.    HEART: Regular rate and rhythm without murmur.    ABDOMEN: Soft, nontender, and nondistended.  No hepatosplenomegaly is noted.    : No painful voiding, no pelvic pain    EXTREMITIES: Without any cyanosis, clubbing, rash, lesions or + 1 edema.    NEUROLOGIC: Awake,     SKIN: Warm, dry, good turgor. + sacral decubiti      LABS:                        8.2    7.55  )-----------( 305      ( 04 Dec 2020 06:26 )             27.5     12-04    148<H>  |  111<H>  |  15  ----------------------------<  92  3.6   |  35<H>  |  0.65    Ca    8.6      04 Dec 2020 06:26  Phos  3.3     12-04  Mg     1.9     12-04    TPro  6.6  /  Alb  2.0<L>  /  TBili  1.5<H>  /  DBili  x   /  AST  75<H>  /  ALT  55  /  AlkPhos  285<H>  12-04                        MICROBIOLOGY: (if applicable)    RADIOLOGY & ADDITIONAL STUDIES:  EKG:   CXR:  ECHO:    IMPRESSION: 65y Female PAST MEDICAL & SURGICAL HISTORY:  Anasarca    Pulmonary embolism    DVT (deep venous thrombosis)    SBO (small bowel obstruction)    H/O exploratory laparotomy     p/w         65y Female from home, lives with daughter, ambulates with walker with PMH of recurrent SBO's, s/p exp lap, SB resection in 2015, ex lap, ALBINA in 2018, DVT, PE, on Xarelto, IVC filter, chronic leg swelling, and anasarca, came in to the ED due to hypotension during office visit. Patient was found to be bacteremic with bacteroids fragilis. Admitted in ICU due to hypotension and hypothermia. patient completed course of antibiotics . BCx X2 negative. Ammonia level elevated and patient refused NGT placement, mental status deteriorated and patient desaturated to high 70%, patient got intubated on 10/24 . Sputum was positive for MRSA and Stenotrophomonas. patient started on vancomycin and Levaquin. patient was given lactulose for high ammonia level. kidney function and liver function started to deteriorate and patient was leaning toward multi organ failure . high dose of Lasix started and patient was aggressively diuresed. Kidney function improved . Ammonia level dropped to less than 10.  Mental status improved and patient extubated on 11/3/2020. Patient was seen by wound specialist and decubitus ulcer debrided. patient had a drop in h/h s/p debridement . received 1 unit PRBC .     11/12  Transferred from ICU to SCU  `11/17  Midodrine d/c secondary to daptomycin being started.  11/18 Febrile, Tmax 101.5. Repeat BC sent.   11/19 Discontinue velazquez.   11/20 febrile overnight.   11/22 s/p 2 units PRBC for Hgb 6.4  11/24 MRI pelvis (+) OM  11/25 PAVAN no endocarditis. Awaiting PICC  11/27 Spiked fever of 101F; Repeated cultures, chest xray, lactate, Follow up results. IR Denies PICC Placement due to fever. Plan for IR Monday (NPO Sunday) if afebrile over the weekend. Given one dose of lasix IV today for pleural effusion on chest xray; Give one more dose of IV Lasix 40mg tomorrow at 2pm if BP stable. Continue with daily lasix.         Sugg:  -continue diuresis  -antibx as per ID  -wound care  -cxr   -dvt/gi prophy    d/c with icu team on rounds

## 2020-12-04 NOTE — PROGRESS NOTE ADULT - SUBJECTIVE AND OBJECTIVE BOX
Patient was seen and examined  Patient is a 65y old  Female who presents with a chief complaint of Hypotension (03 Dec 2020 11:25)      INTERVAL HPI/OVERNIGHT EVENTS:  T(C): 36.4 (12-04-20 @ 05:30), Max: 39.2 (12-03-20 @ 20:40)  HR: 93 (12-04-20 @ 05:30) (92 - 123)  BP: 110/70 (12-04-20 @ 05:30) (110/60 - 130/72)  RR: 16 (12-04-20 @ 05:30) (16 - 20)  SpO2: 100% (12-04-20 @ 05:30) (97% - 100%)  Wt(kg): --  I&O's Summary      LABS:                        8.2    7.55  )-----------( 305      ( 04 Dec 2020 06:26 )             27.5     12-04    148<H>  |  111<H>  |  15  ----------------------------<  92  3.6   |  35<H>  |  0.65    Ca    8.6      04 Dec 2020 06:26  Phos  3.3     12-04  Mg     1.9     12-04    TPro  6.6  /  Alb  2.0<L>  /  TBili  1.5<H>  /  DBili  x   /  AST  75<H>  /  ALT  55  /  AlkPhos  285<H>  12-04        CAPILLARY BLOOD GLUCOSE      POCT Blood Glucose.: 102 mg/dL (04 Dec 2020 05:10)  POCT Blood Glucose.: 90 mg/dL (04 Dec 2020 00:33)  POCT Blood Glucose.: 134 mg/dL (03 Dec 2020 17:01)  POCT Blood Glucose.: 117 mg/dL (03 Dec 2020 11:51)              MEDICATIONS  (STANDING):  ascorbic acid 500 milliGRAM(s) Oral two times a day  chlorhexidine 2% Cloths 1 Application(s) Topical daily  DAPTOmycin IVPB 350 milliGRAM(s) IV Intermittent every 24 hours  enoxaparin Injectable 50 milliGRAM(s) SubCutaneous daily  fluconAZOLE IVPB 200 milliGRAM(s) IV Intermittent every 24 hours  fluconAZOLE IVPB      furosemide    Tablet 20 milliGRAM(s) Oral daily  meropenem  IVPB 500 milliGRAM(s) IV Intermittent every 8 hours  multivitamin/minerals 1 Tablet(s) Oral daily  nystatin Powder 1 Application(s) Topical two times a day  pantoprazole   Suspension 40 milliGRAM(s) Enteral Tube daily  rifAXIMin 550 milliGRAM(s) Oral two times a day  zinc sulfate 220 milliGRAM(s) Oral daily    MEDICATIONS  (PRN):  acetaminophen   Tablet .. 650 milliGRAM(s) Oral every 6 hours PRN Temp greater or equal to 38C (100.4F)  ALBUTerol    90 MICROgram(s) HFA Inhaler 2 Puff(s) Inhalation every 6 hours PRN Shortness of Breath and/or Wheezing  sodium chloride 0.9% lock flush 10 milliLiter(s) IV Push every 1 hour PRN Pre/post blood products, medications, blood draw, and to maintain line patency      RADIOLOGY & ADDITIONAL TESTS:    Imaging Personally Reviewed:  [ ] YES  [ ] NO    REVIEW OF SYSTEMS:  CONSTITUTIONAL: No fever, weight loss, or fatigue  EYES: No eye pain, visual disturbances, or discharge  ENMT:  No difficulty hearing, tinnitus, vertigo; No sinus or throat pain  NECK: No pain or stiffness  BREASTS: No pain, masses, or nipple discharge  RESPIRATORY: No cough, wheezing, chills or hemoptysis; No shortness of breath  CARDIOVASCULAR: No chest pain, palpitations, dizziness, or leg swelling  GASTROINTESTINAL: No abdominal or epigastric pain. No nausea, vomiting, or hematemesis; No diarrhea or constipation. No melena or hematochezia.  GENITOURINARY: No dysuria, frequency, hematuria, or incontinence  NEUROLOGICAL: No headaches, memory loss, loss of strength, numbness, or tremors  SKIN: No itching, burning, rashes, or lesions   LYMPH NODES: No enlarged glands  ENDOCRINE: No heat or cold intolerance; No hair loss  MUSCULOSKELETAL: No joint pain or swelling; No muscle, back, or extremity pain  PSYCHIATRIC: No depression, anxiety, mood swings, or difficulty sleeping  HEME/LYMPH: No easy bruising, or bleeding gums  ALLERY AND IMMUNOLOGIC: No hives or eczema      Consultant(s) Notes Reviewed:  [ x ] YES  [ ] NO    PHYSICAL EXAM:  GENERAL: NAD, well-groomed, well-developed  HEAD:  Atraumatic, Normocephalic  EYES: EOMI, PERRLA, conjunctiva and sclera clear  ENMT: No tonsillar erythema, exudates, or enlargement; Moist mucous membranes, Good dentition, No lesions  NECK: Supple, No JVD, Normal thyroid  NERVOUS SYSTEM:  Alert & Oriented X3, Good concentration; Motor Strength 5/5 B/L upper and lower extremities; DTRs 2+ intact and symmetric  CHEST/LUNG: Clear to percussion bilaterally; No rales, rhonchi, wheezing, or rubs  HEART: Regular rate and rhythm; No murmurs, rubs, or gallops  ABDOMEN: Soft, Nontender, Nondistended; Bowel sounds present  EXTREMITIES:  2+ Peripheral Pulses, No clubbing, cyanosis, or edema  LYMPH: No lymphadenopathy noted  SKIN: No rashes or lesions    Care Discussed with Consultants/Other Providers [ x] YES  [ ] NO

## 2020-12-04 NOTE — PROGRESS NOTE ADULT - ASSESSMENT
Patient is a 65y old  Female from home, lives with daughter, ambulates with walker with PMH of recurrent SBO's, s/p exp lap, SB resection in 2015, ex lap, ALBINA in 2018, DVT, PE, on Xarelto, IVC filter, chronic leg swelling, and anasarca, Now send in to the ER by her PCP, Dr. Ross, for evaluation of  generalized weakness and hypotension BP of 80/40 during office visit. On admission, she found to have no fever and BP was in lower normal range and no Leukocytosis. The CXR is clear and CT abd/pelvis consistent with Ileus. The ID consult requested to assist with evaluation of infectious etiology of episode of hypotension. Found to have Bacteroides bacteremia and now developed septic shock on Cefepime and Flagyl. Hence transferred to ICU. 10/20/20.    # Hypotension  at office- BP of 80/40 - resolved   #  Bacteroides fragilis Bacteremia - 10/13/20 - ? source most likely GI- Repeat Blood Cxs have NGTD 10/16/20  # Ileus  - h/o recurrent SBO and s/p EXp. LAp  # COVID 19 negative   # Septic shock ( Hypothermia + hypotensive)- transferred to ICU since requiring pressor- source GI, The CT abd/pelvis shows nonspecific enterocolitis, noninfectious inflammatory bowel disease, or ischemic bowel, along with ileus.   # Pneumonia- HCAP vs Aspiration - on CXR 10/24 and CT chest 10/21- sputum Cx grew Methicillin resistant Staphylococcus aureus  and Stenotrophomonas maltophilia.  # S/p extubated 11/9/20  # Infected sacral ulcer- s/p debridement and culture grew Enterococcus, VRE and Candida, sensitivity is pending- The MRI of pelvis shows  Osteomyelitis of the lower sacrum and coccyx with overlying cutaneous ulceration.   # Fever- 11/8 and 11/19- BCX NGTD 11/19 - The CXR shows Improvement in bilateral airspace disease with persistent bibasilar consolidation. She is s/p removal of velazquez catheter and passed TOV.  # HCAP- 11/27/20,  Intermittent fever and CXR shows  bilateral consolidations right greater than left. - The procalcitonin level is 1.55  # s/p PICC line placement 11/30      would recommend:    1. OOB to chair   2. Continue Meropenem X 7 days in total , until 12/4/20  3. Continue Daptomycin to cover VRE  and  Fluconazole to cover Candida in sacral wound culture X 6  weeks to treat Osteomyelitis, might change to Linezolid for last two weeks of the course  4. Aspiration precaution  5. Frequent repositioning       Attending Attestation:    Spent more than 45 minutes on total encounter, more than 50 % of the visit was spent counseling and/or coordinating care by the Attending physician.       Patient is a 65y old  Female from home, lives with daughter, ambulates with walker with PMH of recurrent SBO's, s/p exp lap, SB resection in 2015, ex lap, ALBINA in 2018, DVT, PE, on Xarelto, IVC filter, chronic leg swelling, and anasarca, Now send in to the ER by her PCP, Dr. Ross, for evaluation of  generalized weakness and hypotension BP of 80/40 during office visit. On admission, she found to have no fever and BP was in lower normal range and no Leukocytosis. The CXR is clear and CT abd/pelvis consistent with Ileus. The ID consult requested to assist with evaluation of infectious etiology of episode of hypotension. Found to have Bacteroides bacteremia and now developed septic shock on Cefepime and Flagyl. Hence transferred to ICU. 10/20/20.    # Hypotension  at office- BP of 80/40 - resolved   #  Bacteroides fragilis Bacteremia - 10/13/20 - ? source most likely GI- Repeat Blood Cxs have NGTD 10/16/20  # Ileus  - h/o recurrent SBO and s/p EXp. LAp  # COVID 19 negative   # Septic shock ( Hypothermia + hypotensive)- transferred to ICU since requiring pressor- source GI, The CT abd/pelvis shows nonspecific enterocolitis, noninfectious inflammatory bowel disease, or ischemic bowel, along with ileus.   # Pneumonia- HCAP vs Aspiration - on CXR 10/24 and CT chest 10/21- sputum Cx grew Methicillin resistant Staphylococcus aureus  and Stenotrophomonas maltophilia.  # S/p extubated 11/9/20  # Infected sacral ulcer- s/p debridement and culture grew Enterococcus, VRE and Candida, sensitivity is pending- The MRI of pelvis shows  Osteomyelitis of the lower sacrum and coccyx with overlying cutaneous ulceration.   # Fever- 11/8 and 11/19- BCX NGTD 11/19 - The CXR shows Improvement in bilateral airspace disease with persistent bibasilar consolidation. She is s/p removal of velazquez catheter and passed TOV.  # HCAP- 11/27/20,  Intermittent fever and CXR shows  bilateral consolidations right greater than left. - The procalcitonin level is 1.55  # s/p PICC line placement 11/30      would recommend:    1. Disocntinue Meropenem after today's doses  2. Continue Daptomycin to cover VRE  and  Fluconazole to cover Candida in sacral wound culture X 6  weeks to treat Osteomyelitis, might change to Linezolid for last two weeks of the course  3. Aspiration precaution  4. Frequent repositioning   5. Monitor Temp. and c/w supportive care      Attending Attestation:    Spent more than 40 minutes on total encounter, more than 50 % of the visit was spent counseling and/or coordinating care by the Attending physician.

## 2020-12-04 NOTE — PROGRESS NOTE ADULT - SUBJECTIVE AND OBJECTIVE BOX
Patient is seen and examined at the bed side, remains afebrile.  She is tolerating Abx well.      REVIEW OF SYSTEMS: All other review systems are negative      ALLERGIES: No Known Allergies      Vital Signs Last 24 Hrs  T(C): 36.7 (04 Dec 2020 13:16), Max: 39.2 (03 Dec 2020 20:40)  T(F): 98.1 (04 Dec 2020 13:16), Max: 102.6 (03 Dec 2020 20:40)  HR: 97 (04 Dec 2020 13:16) (93 - 123)  BP: 136/80 (04 Dec 2020 13:16) (110/70 - 136/80)  BP(mean): --  RR: 18 (04 Dec 2020 13:16) (16 - 20)  SpO2: 100% (04 Dec 2020 13:16) (97% - 100%)      PHYSICAL EXAM:  GENERAL: Not in distress, on oxygen via NC  CHEST/LUNG: Not using accessory muscles   HEART: s1 and s2 present  ABDOMEN:  Nontender and  Nondistended  EXTREMITIES: B/L UE edematous improved  CNS: Awake and Alert         LABS:                                   8.2    7.55  )-----------( 305      ( 04 Dec 2020 06:26 )             27.5              9.9    9.41  )-----------( 269      ( 24 Nov 2020 06:38 )             31.5                           9.4    13.14 )-----------( 149      ( 14 Nov 2020 07:58 )             28.7       12-04    148<H>  |  111<H>  |  15  ----------------------------<  92  3.6   |  35<H>  |  0.65    Ca    8.6      04 Dec 2020 06:26  Phos  3.3     12-04  Mg     1.9     12-04    TPro  6.6  /  Alb  2.0<L>  /  TBili  1.5<H>  /  DBili  x   /  AST  75<H>  /  ALT  55  /  AlkPhos  285<H>  12-04    12-03    148<H>  |  110<H>  |  14  ----------------------------<  74  3.7   |  34<H>  |  0.61    Ca    8.3<L>      03 Dec 2020 06:51  Phos  3.2     12-03  Mg     1.9     12-03    TPro  6.3  /  Alb  1.9<L>  /  TBili  1.6<H>  /  DBili  x   /  AST  74<H>  /  ALT  53  /  AlkPhos  271<H>  12-03        11-06    138  |  104  |  37<H>  ----------------------------<  81  3.9   |  25  |  2.37<H>    Ca    8.1<L>      06 Nov 2020 06:20  Phos  3.4     11-06  Mg     2.0     11-06    TPro  5.1<L>  /  Alb  2.8<L>  /  TBili  9.2<H>  /  DBili  x   /  AST  233<H>  /  ALT  99<H>  /  AlkPhos  96  11-06      Vancomycin Level, Trough (11.07.20 @ 21:49)   Vancomycin Level, Trough: 16.1:     Vancomycin Level, Trough (11.07.20 @ 07:08)   Vancomycin Level, Trough: 19.5    Vancomycin Level, Trough (11.06.20 @ 06:20)   Vancomycin Level, Trough: 19.9:       Procalcitonin, Serum (11.28.20 @ 15:38)   Procalcitonin, Serum: 1.55:       MEDICATIONS  (STANDING):    ascorbic acid 500 milliGRAM(s) Oral two times a day  chlorhexidine 2% Cloths 1 Application(s) Topical daily  DAPTOmycin IVPB 350 milliGRAM(s) IV Intermittent every 24 hours  enoxaparin Injectable 50 milliGRAM(s) SubCutaneous daily  fluconAZOLE IVPB 200 milliGRAM(s) IV Intermittent every 24 hours  fluconAZOLE IVPB      furosemide    Tablet 20 milliGRAM(s) Oral daily  meropenem  IVPB 500 milliGRAM(s) IV Intermittent every 8 hours  multivitamin/minerals 1 Tablet(s) Oral daily  nystatin Powder 1 Application(s) Topical two times a day  pantoprazole   Suspension 40 milliGRAM(s) Enteral Tube daily  rifAXIMin 550 milliGRAM(s) Oral two times a day  zinc sulfate 220 milliGRAM(s) Oral daily          RADIOLOGY & ADDITIONAL TESTS:    11/27/20 : Xray Chest 1 View- PORTABLE-Urgent (Xray Chest 1 View- PORTABLE-Urgent .) (11.27.20 @ 06:53) Increased interstitial lung markings with bilateral consolidations right greater than left. Small right pleural effusion. Overall worsening compared to prior exam.      11/24/20 : MR Pelvis Bony Only No Cont (11.24.20 @ 18:02) : Osteomyelitis of the lower sacrum and coccyx with overlying cutaneous ulceration,      11/19/20 : Xray Chest 1 View- PORTABLE-Urgent (Xray Chest 1 View- PORTABLE-Urgent .) (11.19.20 @ 23:53): Improvement in bilateral airspace disease with persistent bibasilar consolidation and small effusions.    11/4/20 : Xray Chest 1 View- PORTABLE-Routine (Xray Chest 1 View- PORTABLE-Routine in AM.) (11.04.20 @ 10:59) Reexpansion of the left lung with residual left pleural effusion and/or infiltrate.  Right pulmonary edema unchanged.  Tubes and catheters in satisfactory position.      10/25/20: Xray Chest 1 View- PORTABLE-Routine (Xray Chest 1 View- PORTABLE-Routine in AM.) (10.25.20 @ 09:21) Frontal expiratory view of the chest shows the heart to be similar in size. Endotracheal tube, right jugular line and feeding tube remain present. The lungs show similar left upper lobe infiltrate with progression of right perihilar infiltrate. Pleural effusions are similar. There is no evidence of pneumothorax.    10/21/20 : CT Abdomen and Pelvis w/ Oral Cont and w/ IV Cont (10.21.20 @ 15:59) Mural thickening of the left colon and rectum. Liquid stool in the colon. Apparent segmental mural thickening of the mid to distal small bowel and the proximal duodenum. Findings may represent nonspecific enterocolitis, noninfectious inflammatory bowel disease, or ischemic bowel. Clinical correlation is recommended. The celiac axis artery, SMA, and KINA are patent without stenosis.    Dilatation of the mid small bowel is again noted. Oral contrast has reached the terminal ileum. Findings may represent small bowel obstruction, or ileus related to nonspecific enterocolitis.   Cholelithiasis. Moderate to large ascites in the abdomen, increased since the previous examination. 5 mm nonspecific noncalcified left upper lobe lung nodule; if the patient's is in the high risk category (i.e. smoker), follow-up chest CT may be pursued in 12 months to ensure stability. Combination of atelectasis and consolidation in the left lower lobe.  Possible 1.0 cm hypodense lesion in the left lobe of the thyroid. Thyroid ultrasound may be pursued for further evaluation.   Mild bilateral pleural effusions.  Aging determinate compression fracture at T5 vertebra.        MICROBIOLOGY DATA:    MRSA/MSSA PCR (11.28.20 @ 11:34)   MRSA PCR Result.: Detected:     Urine Microscopic-Add On (NC) (11.20.20 @ 04:12)   Red Blood Cell - Urine: 5-10 /HPF   White Blood Cell - Urine: 11-25 /HPF   Calcium Oxalate Crystals: Few /HPF   Bacteria: Moderate /HPF   Comment - Urine: few amorphous urates   Epithelial Cells: Few /HPF     Culture - Blood (11.19.20 @ 10:19)   Specimen Source: .Blood Blood-Peripheral   Culture Results: No growth to date.     Culture - Surgical Swab (11.12.20 @ 05:01)   - Ampicillin: R >8 Predicts results to ampicillin/sulbactam, amoxacillin-clavulanate and piperacillin-tazobactam.   - Levofloxacin: R >4   - Linezolid: S 1   - Tetra/Doxy: R >8   - Vancomycin: R >16   Specimen Source: .Surgical Swab Sacral decub   Culture Results:   Few Enterococcus faecium (vancomycin resistant)   Rare Candida albicans "Susceptibilities not performed"   Organism Identification: Enterococcus faecium (vancomycin resistant)   Organism: Enterococcus faecium (vancomycin resistant)   Method Type: STEWART     Culture - Surgical Swab (11.12.20 @ 05:01)   Specimen Source: .Surgical Swab Sacral decub   Culture Results:  Few Enterococcus faecium Susceptibility to follow.   Rare Candida albicans "Susceptibilities not performed"     Culture - Sputum . (10.26.20 @ 00:26)   - Ampicillin/Sulbactam: R <=8/4   - Cefazolin: R >16   - Ceftazidime: I 16   - Clindamycin: R >4   - Erythromycin: R >4   - Gentamicin: S <=1 Should not be used as monotherapy   - Levofloxacin: S <=0.5   - Linezolid: S 2   - Oxacillin: R >2   - Penicillin: R >8   - RIF- Rifampin: S <=1 Should not be used as monotherapy   - Tetra/Doxy: S <=1   - Trimethoprim/Sulfamethoxazole: S <=0.5/9.5   - Trimethoprim/Sulfamethoxazole: S <=0.5/9.5   - Vancomycin: S 1     MRSA/MSSA PCR (10.21.20 @ 09:41)   MRSA PCR Result.: Detected:       Culture - Blood (10.20.20 @ 22:09)   Specimen Source: .Blood Blood   Culture Results:  No growth to date.     Culture - Blood (10.20.20 @ 22:09)   Specimen Source: .Blood Blood   Culture Results:   No growth to date.     Culture - Blood in AM (10.16.20 @ 10:13)   Specimen Source: .Blood Blood-Peripheral   Culture Results: No growth to date.     Culture - Blood in AM (10.16.20 @ 10:13)   Specimen Source: .Blood Blood-Peripheral   Culture Results: No growth to date.     Culture - Blood (10.13.20 @ 22:23)   Growth in anaerobic bottle: Gram Negative Rods   Specimen Source: .Blood Blood-Peripheral   Organism: Blood Culture PCR   Culture Results: Growth in anaerobic bottle: Bacteroides fragilis   "Susceptibilities not performed"     Culture - Blood (10.13.20 @ 22:23)   Specimen Source: .Blood Blood-Peripheral   Culture Results:   No growth to date.        Patient is seen and examined at the bed side, is afebrile now, still spiking fever intermittently.  The WBC count and creatinine stay normal.      REVIEW OF SYSTEMS: All other review systems are negative      ALLERGIES: No Known Allergies      Vital Signs Last 24 Hrs  T(C): 36.7 (04 Dec 2020 13:16), Max: 39.2 (03 Dec 2020 20:40)  T(F): 98.1 (04 Dec 2020 13:16), Max: 102.6 (03 Dec 2020 20:40)  HR: 97 (04 Dec 2020 13:16) (93 - 123)  BP: 136/80 (04 Dec 2020 13:16) (110/70 - 136/80)  BP(mean): --  RR: 18 (04 Dec 2020 13:16) (16 - 20)  SpO2: 100% (04 Dec 2020 13:16) (97% - 100%)      PHYSICAL EXAM:  GENERAL: Not in distress, on oxygen via NC  CHEST/LUNG: Not using accessory muscles   HEART: s1 and s2 present  ABDOMEN:  Nontender and  Nondistended  EXTREMITIES: B/L UE edematous improved  CNS: Awake and Alert         LABS:                                   8.2    7.55  )-----------( 305      ( 04 Dec 2020 06:26 )             27.5              9.9    9.41  )-----------( 269      ( 24 Nov 2020 06:38 )             31.5                           9.4    13.14 )-----------( 149      ( 14 Nov 2020 07:58 )             28.7       12-04    148<H>  |  111<H>  |  15  ----------------------------<  92  3.6   |  35<H>  |  0.65    Ca    8.6      04 Dec 2020 06:26  Phos  3.3     12-04  Mg     1.9     12-04    TPro  6.6  /  Alb  2.0<L>  /  TBili  1.5<H>  /  DBili  x   /  AST  75<H>  /  ALT  55  /  AlkPhos  285<H>  12-04    12-03    148<H>  |  110<H>  |  14  ----------------------------<  74  3.7   |  34<H>  |  0.61    Ca    8.3<L>      03 Dec 2020 06:51  Phos  3.2     12-03  Mg     1.9     12-03    TPro  6.3  /  Alb  1.9<L>  /  TBili  1.6<H>  /  DBili  x   /  AST  74<H>  /  ALT  53  /  AlkPhos  271<H>  12-03        11-06    138  |  104  |  37<H>  ----------------------------<  81  3.9   |  25  |  2.37<H>    Ca    8.1<L>      06 Nov 2020 06:20  Phos  3.4     11-06  Mg     2.0     11-06    TPro  5.1<L>  /  Alb  2.8<L>  /  TBili  9.2<H>  /  DBili  x   /  AST  233<H>  /  ALT  99<H>  /  AlkPhos  96  11-06      Vancomycin Level, Trough (11.07.20 @ 21:49)   Vancomycin Level, Trough: 16.1:     Vancomycin Level, Trough (11.07.20 @ 07:08)   Vancomycin Level, Trough: 19.5    Vancomycin Level, Trough (11.06.20 @ 06:20)   Vancomycin Level, Trough: 19.9:       Procalcitonin, Serum (11.28.20 @ 15:38)   Procalcitonin, Serum: 1.55:       MEDICATIONS  (STANDING):    ascorbic acid 500 milliGRAM(s) Oral two times a day  chlorhexidine 2% Cloths 1 Application(s) Topical daily  DAPTOmycin IVPB 350 milliGRAM(s) IV Intermittent every 24 hours  enoxaparin Injectable 50 milliGRAM(s) SubCutaneous daily  fluconAZOLE IVPB 200 milliGRAM(s) IV Intermittent every 24 hours  fluconAZOLE IVPB      furosemide    Tablet 20 milliGRAM(s) Oral daily  meropenem  IVPB 500 milliGRAM(s) IV Intermittent every 8 hours  multivitamin/minerals 1 Tablet(s) Oral daily  nystatin Powder 1 Application(s) Topical two times a day  pantoprazole   Suspension 40 milliGRAM(s) Enteral Tube daily  rifAXIMin 550 milliGRAM(s) Oral two times a day  zinc sulfate 220 milliGRAM(s) Oral daily          RADIOLOGY & ADDITIONAL TESTS:    11/27/20 : Xray Chest 1 View- PORTABLE-Urgent (Xray Chest 1 View- PORTABLE-Urgent .) (11.27.20 @ 06:53) Increased interstitial lung markings with bilateral consolidations right greater than left. Small right pleural effusion. Overall worsening compared to prior exam.      11/24/20 : MR Pelvis Bony Only No Cont (11.24.20 @ 18:02) : Osteomyelitis of the lower sacrum and coccyx with overlying cutaneous ulceration,      11/19/20 : Xray Chest 1 View- PORTABLE-Urgent (Xray Chest 1 View- PORTABLE-Urgent .) (11.19.20 @ 23:53): Improvement in bilateral airspace disease with persistent bibasilar consolidation and small effusions.    11/4/20 : Xray Chest 1 View- PORTABLE-Routine (Xray Chest 1 View- PORTABLE-Routine in AM.) (11.04.20 @ 10:59) Reexpansion of the left lung with residual left pleural effusion and/or infiltrate.  Right pulmonary edema unchanged.  Tubes and catheters in satisfactory position.      10/25/20: Xray Chest 1 View- PORTABLE-Routine (Xray Chest 1 View- PORTABLE-Routine in AM.) (10.25.20 @ 09:21) Frontal expiratory view of the chest shows the heart to be similar in size. Endotracheal tube, right jugular line and feeding tube remain present. The lungs show similar left upper lobe infiltrate with progression of right perihilar infiltrate. Pleural effusions are similar. There is no evidence of pneumothorax.    10/21/20 : CT Abdomen and Pelvis w/ Oral Cont and w/ IV Cont (10.21.20 @ 15:59) Mural thickening of the left colon and rectum. Liquid stool in the colon. Apparent segmental mural thickening of the mid to distal small bowel and the proximal duodenum. Findings may represent nonspecific enterocolitis, noninfectious inflammatory bowel disease, or ischemic bowel. Clinical correlation is recommended. The celiac axis artery, SMA, and KINA are patent without stenosis.    Dilatation of the mid small bowel is again noted. Oral contrast has reached the terminal ileum. Findings may represent small bowel obstruction, or ileus related to nonspecific enterocolitis.   Cholelithiasis. Moderate to large ascites in the abdomen, increased since the previous examination. 5 mm nonspecific noncalcified left upper lobe lung nodule; if the patient's is in the high risk category (i.e. smoker), follow-up chest CT may be pursued in 12 months to ensure stability. Combination of atelectasis and consolidation in the left lower lobe.  Possible 1.0 cm hypodense lesion in the left lobe of the thyroid. Thyroid ultrasound may be pursued for further evaluation.   Mild bilateral pleural effusions.  Aging determinate compression fracture at T5 vertebra.        MICROBIOLOGY DATA:    MRSA/MSSA PCR (11.28.20 @ 11:34)   MRSA PCR Result.: Detected:     Urine Microscopic-Add On (NC) (11.20.20 @ 04:12)   Red Blood Cell - Urine: 5-10 /HPF   White Blood Cell - Urine: 11-25 /HPF   Calcium Oxalate Crystals: Few /HPF   Bacteria: Moderate /HPF   Comment - Urine: few amorphous urates   Epithelial Cells: Few /HPF     Culture - Blood (11.19.20 @ 10:19)   Specimen Source: .Blood Blood-Peripheral   Culture Results: No growth to date.     Culture - Surgical Swab (11.12.20 @ 05:01)   - Ampicillin: R >8 Predicts results to ampicillin/sulbactam, amoxacillin-clavulanate and piperacillin-tazobactam.   - Levofloxacin: R >4   - Linezolid: S 1   - Tetra/Doxy: R >8   - Vancomycin: R >16   Specimen Source: .Surgical Swab Sacral decub   Culture Results:   Few Enterococcus faecium (vancomycin resistant)   Rare Candida albicans "Susceptibilities not performed"   Organism Identification: Enterococcus faecium (vancomycin resistant)   Organism: Enterococcus faecium (vancomycin resistant)   Method Type: STEWART     Culture - Surgical Swab (11.12.20 @ 05:01)   Specimen Source: .Surgical Swab Sacral decub   Culture Results:  Few Enterococcus faecium Susceptibility to follow.   Rare Candida albicans "Susceptibilities not performed"     Culture - Sputum . (10.26.20 @ 00:26)   - Ampicillin/Sulbactam: R <=8/4   - Cefazolin: R >16   - Ceftazidime: I 16   - Clindamycin: R >4   - Erythromycin: R >4   - Gentamicin: S <=1 Should not be used as monotherapy   - Levofloxacin: S <=0.5   - Linezolid: S 2   - Oxacillin: R >2   - Penicillin: R >8   - RIF- Rifampin: S <=1 Should not be used as monotherapy   - Tetra/Doxy: S <=1   - Trimethoprim/Sulfamethoxazole: S <=0.5/9.5   - Trimethoprim/Sulfamethoxazole: S <=0.5/9.5   - Vancomycin: S 1     MRSA/MSSA PCR (10.21.20 @ 09:41)   MRSA PCR Result.: Detected:       Culture - Blood (10.20.20 @ 22:09)   Specimen Source: .Blood Blood   Culture Results:  No growth to date.     Culture - Blood (10.20.20 @ 22:09)   Specimen Source: .Blood Blood   Culture Results:   No growth to date.     Culture - Blood in AM (10.16.20 @ 10:13)   Specimen Source: .Blood Blood-Peripheral   Culture Results: No growth to date.     Culture - Blood in AM (10.16.20 @ 10:13)   Specimen Source: .Blood Blood-Peripheral   Culture Results: No growth to date.     Culture - Blood (10.13.20 @ 22:23)   Growth in anaerobic bottle: Gram Negative Rods   Specimen Source: .Blood Blood-Peripheral   Organism: Blood Culture PCR   Culture Results: Growth in anaerobic bottle: Bacteroides fragilis   "Susceptibilities not performed"     Culture - Blood (10.13.20 @ 22:23)   Specimen Source: .Blood Blood-Peripheral   Culture Results:   No growth to date.

## 2020-12-04 NOTE — PROGRESS NOTE ADULT - SUBJECTIVE AND OBJECTIVE BOX
MARIBETH PADILLA    SCU progress note    INTERVAL HPI/OVERNIGHT EVENTS: No acute complaints, resting comfortably    DNR [x ]   DNI  [  ]    Covid - 19 PCR:     The 4Ms    What Matters Most: see GOC  Age appropriate Medications/Screen for High Risk Medication: Yes  Mentation: see CAM below  Mobility: defer to physical exam    The Confusion Assessment Method (CAM) Diagnostic Algorithm     1: Acute Onset or Fluctuating Course  - Is there evidence of an acute change in mental status from the patient’s baseline? Did the (abnormal) behavior  fluctuate during the day, that is, tend to come and go, or increase and decrease in severity?  [ ] YES [ ] NO     2: Inattention  - Did the patient have difficulty focusing attention, being easily distractible, or having difficulty keeping track of what was being said?  [ ] YES [x ] NO     3: Disorganized thinking  -Was the patient’s thinking disorganized or incoherent, such as rambling or irrelevant conversation, unclear or illogical flow of ideas, or unpredictable switching from subject to subject?  [ ] YES [x ] NO    4: Altered Level of consciousness?  [ ] YES [x ] NO    The diagnosis of delirium by CAM requires the presence of features 1 and 2 and either 3 or 4.    PRESSORS: [ ] YES [x ] NO  DAPTOmycin IVPB 350 milliGRAM(s) IV Intermittent every 24 hours  fluconAZOLE IVPB 200 milliGRAM(s) IV Intermittent every 24 hours  fluconAZOLE IVPB      meropenem  IVPB 500 milliGRAM(s) IV Intermittent every 8 hours  rifAXIMin 550 milliGRAM(s) Oral two times a day    Cardiovascular:  Heart Failure  Acute   Acute on Chronic  Chronic       furosemide    Tablet 20 milliGRAM(s) Oral daily    Pulmonary:  ALBUTerol    90 MICROgram(s) HFA Inhaler 2 Puff(s) Inhalation every 6 hours PRN    Hematalogic:  enoxaparin Injectable 50 milliGRAM(s) SubCutaneous daily    Other:  acetaminophen   Tablet .. 650 milliGRAM(s) Oral every 6 hours PRN  ascorbic acid 500 milliGRAM(s) Oral two times a day  chlorhexidine 2% Cloths 1 Application(s) Topical daily  multivitamin/minerals 1 Tablet(s) Oral daily  nystatin Powder 1 Application(s) Topical two times a day  pantoprazole   Suspension 40 milliGRAM(s) Enteral Tube daily  sodium chloride 0.9% lock flush 10 milliLiter(s) IV Push every 1 hour PRN  zinc sulfate 220 milliGRAM(s) Oral daily    acetaminophen   Tablet .. 650 milliGRAM(s) Oral every 6 hours PRN  ALBUTerol    90 MICROgram(s) HFA Inhaler 2 Puff(s) Inhalation every 6 hours PRN  ascorbic acid 500 milliGRAM(s) Oral two times a day  chlorhexidine 2% Cloths 1 Application(s) Topical daily  DAPTOmycin IVPB 350 milliGRAM(s) IV Intermittent every 24 hours  enoxaparin Injectable 50 milliGRAM(s) SubCutaneous daily  fluconAZOLE IVPB 200 milliGRAM(s) IV Intermittent every 24 hours  fluconAZOLE IVPB      furosemide    Tablet 20 milliGRAM(s) Oral daily  meropenem  IVPB 500 milliGRAM(s) IV Intermittent every 8 hours  multivitamin/minerals 1 Tablet(s) Oral daily  nystatin Powder 1 Application(s) Topical two times a day  pantoprazole   Suspension 40 milliGRAM(s) Enteral Tube daily  rifAXIMin 550 milliGRAM(s) Oral two times a day  sodium chloride 0.9% lock flush 10 milliLiter(s) IV Push every 1 hour PRN  zinc sulfate 220 milliGRAM(s) Oral daily    Drug Dosing Weight  Height (cm): 167.6 (21 Oct 2020 02:26)  Weight (kg): 56.2 (21 Oct 2020 02:26)  BMI (kg/m2): 20 (21 Oct 2020 02:26)  BSA (m2): 1.63 (21 Oct 2020 02:26)    CENTRAL LINE: [ ] YES [ ] NO  LOCATION:   DATE INSERTED:  REMOVE: [ ] YES [ ] NO  EXPLAIN:    TREJO: [ ] YES [ ] NO    DATE INSERTED:  REMOVE:  [ ] YES [ ] NO  EXPLAIN:    PAST MEDICAL & SURGICAL HISTORY:  Anasarca    Pulmonary embolism    DVT (deep venous thrombosis)    SBO (small bowel obstruction)    H/O exploratory laparotomy                      PHYSICAL EXAM:    GENERAL: NAD, well-groomed,   HEAD:  Atraumatic, Normocephalic  EYES: EOMI, PERRLA, conjunctiva and sclera clear  ENMT: No tonsillar erythema, exudates, or enlargement; Moist mucous membranes, Good dentition, No lesions  NECK: Supple, No JVD, Normal thyroid  NERVOUS SYSTEM:  Alert & Oriented X 2, answering questions and moving all extremities  CHEST/LUNG: Clear to percussion bilaterally; No rales, rhonchi, wheezing, or rubs  HEART: Regular rate and rhythm; No murmurs, rubs, or gallops  ABDOMEN: Soft, Nontender, Nondistended; Bowel sounds present  EXTREMITIES:  2+ Peripheral Pulses, No clubbing, cyanosis, or edema  LYMPH: No lymphadenopathy noted  SKIN: sacral ulcer unstageable      LABS:  CBC Full  -  ( 04 Dec 2020 06:26 )  WBC Count : 7.55 K/uL  RBC Count : 2.86 M/uL  Hemoglobin : 8.2 g/dL  Hematocrit : 27.5 %  Platelet Count - Automated : 305 K/uL  Mean Cell Volume : 96.2 fl  Mean Cell Hemoglobin : 28.7 pg  Mean Cell Hemoglobin Concentration : 29.8 gm/dL  Auto Neutrophil # : 5.36 K/uL  Auto Lymphocyte # : 1.35 K/uL  Auto Monocyte # : 0.60 K/uL  Auto Eosinophil # : 0.17 K/uL  Auto Basophil # : 0.04 K/uL  Auto Neutrophil % : 71.0 %  Auto Lymphocyte % : 17.9 %  Auto Monocyte % : 7.9 %  Auto Eosinophil % : 2.3 %  Auto Basophil % : 0.5 %    12-04    148<H>  |  111<H>  |  15  ----------------------------<  92  3.6   |  35<H>  |  0.65    Ca    8.6      04 Dec 2020 06:26  Phos  3.3     12-04  Mg     1.9     12-04    TPro  6.6  /  Alb  2.0<L>  /  TBili  1.5<H>  /  DBili  x   /  AST  75<H>  /  ALT  55  /  AlkPhos  285<H>  12-04              [  ]  DVT Prophylaxis  [  ]  Nutrition, Brand, Rate         Goal Rate        Abnormal Nutritional Status -  Malnutrition   Cachexia      Morbid Obesity BMI >/=40    RADIOLOGY & ADDITIONAL STUDIES:  ***    Goals of Care Discussion with Family/Proxy/Other   - see note from/family meeting set up for...

## 2020-12-05 NOTE — PROGRESS NOTE ADULT - SUBJECTIVE AND OBJECTIVE BOX
Patient is seen and examined at the bed side, is afebrile for last 24 hours. The WBC count and creatinine stay normal.      REVIEW OF SYSTEMS: All other review systems are negative      ALLERGIES: No Known Allergies      Vital Signs Last 24 Hrs  T(C): 36.8 (05 Dec 2020 05:10), Max: 36.8 (05 Dec 2020 05:10)  T(F): 98.2 (05 Dec 2020 05:10), Max: 98.2 (05 Dec 2020 05:10)  HR: 101 (05 Dec 2020 05:10) (97 - 101)  BP: 122/73 (05 Dec 2020 05:10) (116/67 - 136/80)  BP(mean): --  RR: 17 (05 Dec 2020 05:10) (16 - 18)  SpO2: 100% (05 Dec 2020 05:10) (100% - 100%)      PHYSICAL EXAM:  GENERAL: Not in distress, on oxygen via NC  CHEST/LUNG: Not using accessory muscles   HEART: s1 and s2 present  ABDOMEN:  Nontender and  Nondistended  EXTREMITIES: B/L UE edematous improved  CNS: Awake and Alert         LABS:                         8.1    7.81  )-----------( 301      ( 05 Dec 2020 06:38 )             27.4                  9.9    9.41  )-----------( 269      ( 24 Nov 2020 06:38 )             31.5                           9.4    13.14 )-----------( 149      ( 14 Nov 2020 07:58 )             28.7       12-05    145  |  107  |  14  ----------------------------<  92  3.2<L>   |  35<H>  |  0.66    Ca    8.7      05 Dec 2020 06:38  Phos  3.5     12-05  Mg     1.9     12-05    TPro  6.5  /  Alb  1.9<L>  /  TBili  1.5<H>  /  DBili  x   /  AST  68<H>  /  ALT  48  /  AlkPhos  259<H>  12-05    12-04    148<H>  |  111<H>  |  15  ----------------------------<  92  3.6   |  35<H>  |  0.65    Ca    8.6      04 Dec 2020 06:26  Phos  3.3     12-04  Mg     1.9     12-04    TPro  6.6  /  Alb  2.0<L>  /  TBili  1.5<H>  /  DBili  x   /  AST  75<H>  /  ALT  55  /  AlkPhos  285<H>  12-04      11-06    138  |  104  |  37<H>  ----------------------------<  81  3.9   |  25  |  2.37<H>    Ca    8.1<L>      06 Nov 2020 06:20  Phos  3.4     11-06  Mg     2.0     11-06    TPro  5.1<L>  /  Alb  2.8<L>  /  TBili  9.2<H>  /  DBili  x   /  AST  233<H>  /  ALT  99<H>  /  AlkPhos  96  11-06      Vancomycin Level, Trough (11.07.20 @ 21:49)   Vancomycin Level, Trough: 16.1:     Vancomycin Level, Trough (11.07.20 @ 07:08) : 19.5  Vancomycin Level, Trough (11.06.20 @ 06:20) : 19.9:   Procalcitonin, Serum (11.28.20 @ 15:38) : 1.55:       MEDICATIONS  (STANDING):    ascorbic acid 500 milliGRAM(s) Oral two times a day  chlorhexidine 2% Cloths 1 Application(s) Topical daily  DAPTOmycin IVPB 350 milliGRAM(s) IV Intermittent every 24 hours  enoxaparin Injectable 50 milliGRAM(s) SubCutaneous daily  fluconAZOLE IVPB 200 milliGRAM(s) IV Intermittent every 24 hours  fluconAZOLE IVPB      furosemide    Tablet 20 milliGRAM(s) Oral daily  meropenem  IVPB 500 milliGRAM(s) IV Intermittent every 8 hours  multivitamin/minerals 1 Tablet(s) Oral daily  nystatin Powder 1 Application(s) Topical two times a day  pantoprazole   Suspension 40 milliGRAM(s) Enteral Tube daily  rifAXIMin 550 milliGRAM(s) Oral two times a day  zinc sulfate 220 milliGRAM(s) Oral daily        RADIOLOGY & ADDITIONAL TESTS:    11/27/20 : Xray Chest 1 View- PORTABLE-Urgent (Xray Chest 1 View- PORTABLE-Urgent .) (11.27.20 @ 06:53) Increased interstitial lung markings with bilateral consolidations right greater than left. Small right pleural effusion. Overall worsening compared to prior exam.      11/24/20 : MR Pelvis Bony Only No Cont (11.24.20 @ 18:02) : Osteomyelitis of the lower sacrum and coccyx with overlying cutaneous ulceration,      11/19/20 : Xray Chest 1 View- PORTABLE-Urgent (Xray Chest 1 View- PORTABLE-Urgent .) (11.19.20 @ 23:53): Improvement in bilateral airspace disease with persistent bibasilar consolidation and small effusions.    11/4/20 : Xray Chest 1 View- PORTABLE-Routine (Xray Chest 1 View- PORTABLE-Routine in AM.) (11.04.20 @ 10:59) Reexpansion of the left lung with residual left pleural effusion and/or infiltrate.  Right pulmonary edema unchanged.  Tubes and catheters in satisfactory position.      10/25/20: Xray Chest 1 View- PORTABLE-Routine (Xray Chest 1 View- PORTABLE-Routine in AM.) (10.25.20 @ 09:21) Frontal expiratory view of the chest shows the heart to be similar in size. Endotracheal tube, right jugular line and feeding tube remain present. The lungs show similar left upper lobe infiltrate with progression of right perihilar infiltrate. Pleural effusions are similar. There is no evidence of pneumothorax.    10/21/20 : CT Abdomen and Pelvis w/ Oral Cont and w/ IV Cont (10.21.20 @ 15:59) Mural thickening of the left colon and rectum. Liquid stool in the colon. Apparent segmental mural thickening of the mid to distal small bowel and the proximal duodenum. Findings may represent nonspecific enterocolitis, noninfectious inflammatory bowel disease, or ischemic bowel. Clinical correlation is recommended. The celiac axis artery, SMA, and KINA are patent without stenosis.    Dilatation of the mid small bowel is again noted. Oral contrast has reached the terminal ileum. Findings may represent small bowel obstruction, or ileus related to nonspecific enterocolitis.   Cholelithiasis. Moderate to large ascites in the abdomen, increased since the previous examination. 5 mm nonspecific noncalcified left upper lobe lung nodule; if the patient's is in the high risk category (i.e. smoker), follow-up chest CT may be pursued in 12 months to ensure stability. Combination of atelectasis and consolidation in the left lower lobe.  Possible 1.0 cm hypodense lesion in the left lobe of the thyroid. Thyroid ultrasound may be pursued for further evaluation.   Mild bilateral pleural effusions.  Aging determinate compression fracture at T5 vertebra.        MICROBIOLOGY DATA:    MRSA/MSSA PCR (11.28.20 @ 11:34)   MRSA PCR Result.: Detected:     Urine Microscopic-Add On (NC) (11.20.20 @ 04:12)   Red Blood Cell - Urine: 5-10 /HPF   White Blood Cell - Urine: 11-25 /HPF   Calcium Oxalate Crystals: Few /HPF   Bacteria: Moderate /HPF   Comment - Urine: few amorphous urates   Epithelial Cells: Few /HPF     Culture - Blood (11.19.20 @ 10:19)   Specimen Source: .Blood Blood-Peripheral   Culture Results: No growth to date.     Culture - Surgical Swab (11.12.20 @ 05:01)   - Ampicillin: R >8 Predicts results to ampicillin/sulbactam, amoxacillin-clavulanate and piperacillin-tazobactam.   - Levofloxacin: R >4   - Linezolid: S 1   - Tetra/Doxy: R >8   - Vancomycin: R >16   Specimen Source: .Surgical Swab Sacral decub   Culture Results:   Few Enterococcus faecium (vancomycin resistant)   Rare Candida albicans "Susceptibilities not performed"   Organism Identification: Enterococcus faecium (vancomycin resistant)   Organism: Enterococcus faecium (vancomycin resistant)   Method Type: STEWART     Culture - Surgical Swab (11.12.20 @ 05:01)   Specimen Source: .Surgical Swab Sacral decub   Culture Results:  Few Enterococcus faecium Susceptibility to follow.   Rare Candida albicans "Susceptibilities not performed"     Culture - Sputum . (10.26.20 @ 00:26)   - Ampicillin/Sulbactam: R <=8/4   - Cefazolin: R >16   - Ceftazidime: I 16   - Clindamycin: R >4   - Erythromycin: R >4   - Gentamicin: S <=1 Should not be used as monotherapy   - Levofloxacin: S <=0.5   - Linezolid: S 2   - Oxacillin: R >2   - Penicillin: R >8   - RIF- Rifampin: S <=1 Should not be used as monotherapy   - Tetra/Doxy: S <=1   - Trimethoprim/Sulfamethoxazole: S <=0.5/9.5   - Trimethoprim/Sulfamethoxazole: S <=0.5/9.5   - Vancomycin: S 1     MRSA/MSSA PCR (10.21.20 @ 09:41)   MRSA PCR Result.: Detected:       Culture - Blood (10.20.20 @ 22:09)   Specimen Source: .Blood Blood   Culture Results:  No growth to date.     Culture - Blood (10.20.20 @ 22:09)   Specimen Source: .Blood Blood   Culture Results:   No growth to date.     Culture - Blood in AM (10.16.20 @ 10:13)   Specimen Source: .Blood Blood-Peripheral   Culture Results: No growth to date.     Culture - Blood in AM (10.16.20 @ 10:13)   Specimen Source: .Blood Blood-Peripheral   Culture Results: No growth to date.     Culture - Blood (10.13.20 @ 22:23)   Growth in anaerobic bottle: Gram Negative Rods   Specimen Source: .Blood Blood-Peripheral   Organism: Blood Culture PCR   Culture Results: Growth in anaerobic bottle: Bacteroides fragilis   "Susceptibilities not performed"     Culture - Blood (10.13.20 @ 22:23)   Specimen Source: .Blood Blood-Peripheral   Culture Results:   No growth to date.

## 2020-12-05 NOTE — PROGRESS NOTE ADULT - SUBJECTIVE AND OBJECTIVE BOX
Patient was seen and examined  Patient is a 65y old  Female who presents with a chief complaint of Hypotension (04 Dec 2020 18:40)      INTERVAL HPI/OVERNIGHT EVENTS:  T(C): 36.8 (12-05-20 @ 05:10), Max: 36.8 (12-05-20 @ 05:10)  HR: 101 (12-05-20 @ 05:10) (97 - 101)  BP: 122/73 (12-05-20 @ 05:10) (116/67 - 136/80)  RR: 17 (12-05-20 @ 05:10) (16 - 18)  SpO2: 100% (12-05-20 @ 05:10) (100% - 100%)  Wt(kg): --  I&O's Summary      LABS:                        8.1    7.81  )-----------( 301      ( 05 Dec 2020 06:38 )             27.4     12-05    145  |  107  |  14  ----------------------------<  92  3.2<L>   |  35<H>  |  0.66    Ca    8.7      05 Dec 2020 06:38  Phos  3.5     12-05  Mg     1.9     12-05    TPro  6.5  /  Alb  1.9<L>  /  TBili  1.5<H>  /  DBili  x   /  AST  68<H>  /  ALT  48  /  AlkPhos  259<H>  12-05        CAPILLARY BLOOD GLUCOSE      POCT Blood Glucose.: 102 mg/dL (05 Dec 2020 05:48)  POCT Blood Glucose.: 99 mg/dL (04 Dec 2020 23:55)  POCT Blood Glucose.: 102 mg/dL (04 Dec 2020 17:20)  POCT Blood Glucose.: 95 mg/dL (04 Dec 2020 11:47)              MEDICATIONS  (STANDING):  ascorbic acid 500 milliGRAM(s) Oral two times a day  chlorhexidine 2% Cloths 1 Application(s) Topical daily  DAPTOmycin IVPB 350 milliGRAM(s) IV Intermittent every 24 hours  enoxaparin Injectable 50 milliGRAM(s) SubCutaneous daily  fluconAZOLE IVPB 200 milliGRAM(s) IV Intermittent every 24 hours  fluconAZOLE IVPB      furosemide    Tablet 20 milliGRAM(s) Oral daily  meropenem  IVPB 500 milliGRAM(s) IV Intermittent every 8 hours  multivitamin/minerals 1 Tablet(s) Oral daily  nystatin Powder 1 Application(s) Topical two times a day  pantoprazole   Suspension 40 milliGRAM(s) Enteral Tube daily  rifAXIMin 550 milliGRAM(s) Oral two times a day  zinc sulfate 220 milliGRAM(s) Oral daily    MEDICATIONS  (PRN):  acetaminophen   Tablet .. 650 milliGRAM(s) Oral every 6 hours PRN Temp greater or equal to 38C (100.4F)  ALBUTerol    90 MICROgram(s) HFA Inhaler 2 Puff(s) Inhalation every 6 hours PRN Shortness of Breath and/or Wheezing  sodium chloride 0.9% lock flush 10 milliLiter(s) IV Push every 1 hour PRN Pre/post blood products, medications, blood draw, and to maintain line patency      RADIOLOGY & ADDITIONAL TESTS:    Imaging Personally Reviewed:  [ ] YES  [ ] NO    REVIEW OF SYSTEMS:  CONSTITUTIONAL: No fever, weight loss, or fatigue  EYES: No eye pain, visual disturbances, or discharge  ENMT:  No difficulty hearing, tinnitus, vertigo; No sinus or throat pain  NECK: No pain or stiffness  BREASTS: No pain, masses, or nipple discharge  RESPIRATORY: No cough, wheezing, chills or hemoptysis; No shortness of breath  CARDIOVASCULAR: No chest pain, palpitations, dizziness, or leg swelling  GASTROINTESTINAL: No abdominal or epigastric pain. No nausea, vomiting, or hematemesis; No diarrhea or constipation. No melena or hematochezia.  GENITOURINARY: No dysuria, frequency, hematuria, or incontinence  NEUROLOGICAL: No headaches, memory loss, loss of strength, numbness, or tremors  SKIN: No itching, burning, rashes, or lesions   LYMPH NODES: No enlarged glands  ENDOCRINE: No heat or cold intolerance; No hair loss  MUSCULOSKELETAL: No joint pain or swelling; No muscle, back, or extremity pain  PSYCHIATRIC: No depression, anxiety, mood swings, or difficulty sleeping  HEME/LYMPH: No easy bruising, or bleeding gums  ALLERY AND IMMUNOLOGIC: No hives or eczema      Consultant(s) Notes Reviewed:  [ x ] YES  [ ] NO    PHYSICAL EXAM:  GENERAL: NAD, well-groomed, well-developed  HEAD:  Atraumatic, Normocephalic  EYES: EOMI, PERRLA, conjunctiva and sclera clear  ENMT: No tonsillar erythema, exudates, or enlargement; Moist mucous membranes, Good dentition, No lesions  NECK: Supple, No JVD, Normal thyroid  NERVOUS SYSTEM:  Alert & Oriented X3, Good concentration; Motor Strength 5/5 B/L upper and lower extremities; DTRs 2+ intact and symmetric  CHEST/LUNG: Clear to percussion bilaterally; No rales, rhonchi, wheezing, or rubs  HEART: Regular rate and rhythm; No murmurs, rubs, or gallops  ABDOMEN: Soft, Nontender, Nondistended; Bowel sounds present  EXTREMITIES:  2+ Peripheral Pulses, No clubbing, cyanosis, or edema  LYMPH: No lymphadenopathy noted  SKIN: No rashes or lesions    Care Discussed with Consultants/Other Providers [ x] YES  [ ] NO

## 2020-12-05 NOTE — PROGRESS NOTE ADULT - SUBJECTIVE AND OBJECTIVE BOX
MARIBETH PADILLA    SCU progress note    INTERVAL HPI/OVERNIGHT EVENTS: No acute event or complaints.    DNR [X ]   DNI  [  ] With Trial of Intubation    Covid - 19 PCR:     The 4Ms    What Matters Most: see GOC  Age appropriate Medications/Screen for High Risk Medication: Yes  Mentation: see CAM below  Mobility: defer to physical exam    The Confusion Assessment Method (CAM) Diagnostic Algorithm     1: Acute Onset or Fluctuating Course  - Is there evidence of an acute change in mental status from the patient’s baseline? Did the (abnormal) behavior  fluctuate during the day, that is, tend to come and go, or increase and decrease in severity?  [ ] YES [x ] NO     2: Inattention  - Did the patient have difficulty focusing attention, being easily distractible, or having difficulty keeping track of what was being said?  [ ] YES [x ] NO     3: Disorganized thinking  -Was the patient’s thinking disorganized or incoherent, such as rambling or irrelevant conversation, unclear or illogical flow of ideas, or unpredictable switching from subject to subject?  [ ] YES [x] NO    4: Altered Level of consciousness?  [ ] YES [x] NO    The diagnosis of delirium by CAM requires the presence of features 1 and 2 and either 3 or 4.    PRESSORS: [ ] YES [x ] NO  DAPTOmycin IVPB 350 milliGRAM(s) IV Intermittent every 24 hours  fluconAZOLE IVPB 200 milliGRAM(s) IV Intermittent every 24 hours  fluconAZOLE IVPB      meropenem  IVPB 500 milliGRAM(s) IV Intermittent every 8 hours  rifAXIMin 550 milliGRAM(s) Oral two times a day    Cardiovascular:  Heart Failure  Acute   Acute on Chronic  Chronic       furosemide    Tablet 20 milliGRAM(s) Oral daily    Pulmonary:  ALBUTerol    90 MICROgram(s) HFA Inhaler 2 Puff(s) Inhalation every 6 hours PRN    Hematalogic:  enoxaparin Injectable 50 milliGRAM(s) SubCutaneous daily    Other:  acetaminophen   Tablet .. 650 milliGRAM(s) Oral every 6 hours PRN  ascorbic acid 500 milliGRAM(s) Oral two times a day  chlorhexidine 2% Cloths 1 Application(s) Topical daily  multivitamin/minerals 1 Tablet(s) Oral daily  nystatin Powder 1 Application(s) Topical two times a day  pantoprazole   Suspension 40 milliGRAM(s) Enteral Tube daily  potassium chloride  10 mEq/100 mL IVPB 10 milliEquivalent(s) IV Intermittent every 1 hour  sodium chloride 0.9% lock flush 10 milliLiter(s) IV Push every 1 hour PRN  zinc sulfate 220 milliGRAM(s) Oral daily    acetaminophen   Tablet .. 650 milliGRAM(s) Oral every 6 hours PRN  ALBUTerol    90 MICROgram(s) HFA Inhaler 2 Puff(s) Inhalation every 6 hours PRN  ascorbic acid 500 milliGRAM(s) Oral two times a day  chlorhexidine 2% Cloths 1 Application(s) Topical daily  DAPTOmycin IVPB 350 milliGRAM(s) IV Intermittent every 24 hours  enoxaparin Injectable 50 milliGRAM(s) SubCutaneous daily  fluconAZOLE IVPB 200 milliGRAM(s) IV Intermittent every 24 hours  fluconAZOLE IVPB      furosemide    Tablet 20 milliGRAM(s) Oral daily  meropenem  IVPB 500 milliGRAM(s) IV Intermittent every 8 hours  multivitamin/minerals 1 Tablet(s) Oral daily  nystatin Powder 1 Application(s) Topical two times a day  pantoprazole   Suspension 40 milliGRAM(s) Enteral Tube daily  potassium chloride  10 mEq/100 mL IVPB 10 milliEquivalent(s) IV Intermittent every 1 hour  rifAXIMin 550 milliGRAM(s) Oral two times a day  sodium chloride 0.9% lock flush 10 milliLiter(s) IV Push every 1 hour PRN  zinc sulfate 220 milliGRAM(s) Oral daily    Drug Dosing Weight  Height (cm): 167.6 (21 Oct 2020 02:26)  Weight (kg): 56.2 (21 Oct 2020 02:26)  BMI (kg/m2): 20 (21 Oct 2020 02:26)  BSA (m2): 1.63 (21 Oct 2020 02:26)    CENTRAL LINE: [ ] YES [x ] NO  LOCATION:   DATE INSERTED:  REMOVE: [ ] YES [ ] NO  EXPLAIN:    TREJO: [ ] YES [x ] NO    DATE INSERTED:  REMOVE:  [ ] YES [ ] NO  EXPLAIN:    PAST MEDICAL & SURGICAL HISTORY:  Anasarca    Pulmonary embolism    DVT (deep venous thrombosis)    SBO (small bowel obstruction)    H/O exploratory laparotomy                      PHYSICAL EXAM:    GENERAL: NAD, well-groomed, well-developed  HEAD:  Atraumatic, Normocephalic  EYES: EOMI, PERRLA, conjunctiva and sclera clear  ENMT: No tonsillar erythema, exudates, or enlargement; Moist mucous membranes, Good dentition, No lesions  NECK: Supple, No JVD, Normal thyroid  NERVOUS SYSTEM:  Alert & Oriented X2, moving all extremities,   CHEST/LUNG: Clear to percussion bilaterally; No rales, rhonchi, wheezing, or rubs  HEART: Regular rate and rhythm; No murmurs, rubs, or gallops  ABDOMEN: Soft, Nontender, Nondistended; Bowel sounds present  EXTREMITIES:  2+ Peripheral Pulses, No clubbing, cyanosis, or edema  LYMPH: No lymphadenopathy noted  SKIN: Unstageable sacral ulcer       LABS:  CBC Full  -  ( 05 Dec 2020 06:38 )  WBC Count : 7.81 K/uL  RBC Count : 2.82 M/uL  Hemoglobin : 8.1 g/dL  Hematocrit : 27.4 %  Platelet Count - Automated : 301 K/uL  Mean Cell Volume : 97.2 fl  Mean Cell Hemoglobin : 28.7 pg  Mean Cell Hemoglobin Concentration : 29.6 gm/dL  Auto Neutrophil # : 5.40 K/uL  Auto Lymphocyte # : 1.53 K/uL  Auto Monocyte # : 0.56 K/uL  Auto Eosinophil # : 0.23 K/uL  Auto Basophil # : 0.06 K/uL  Auto Neutrophil % : 69.1 %  Auto Lymphocyte % : 19.6 %  Auto Monocyte % : 7.2 %  Auto Eosinophil % : 2.9 %  Auto Basophil % : 0.8 %    12-05    145  |  107  |  14  ----------------------------<  92  3.2<L>   |  35<H>  |  0.66    Ca    8.7      05 Dec 2020 06:38  Phos  3.5     12-05  Mg     1.9     12-05    TPro  6.5  /  Alb  1.9<L>  /  TBili  1.5<H>  /  DBili  x   /  AST  68<H>  /  ALT  48  /  AlkPhos  259<H>  12-05              [  ]  DVT Prophylaxis  [  ]  Nutrition, Brand, Rate         Goal Rate        Abnormal Nutritional Status -  Malnutrition   Cachexia      Morbid Obesity BMI >/=40    RADIOLOGY & ADDITIONAL STUDIES:  ***    Goals of Care Discussion with Family/Proxy/Other   - see note from/family meeting set up for...

## 2020-12-05 NOTE — PROGRESS NOTE ADULT - ASSESSMENT
Patient is a 65y old  Female from home, lives with daughter, ambulates with walker with PMH of recurrent SBO's, s/p exp lap, SB resection in 2015, ex lap, ALBINA in 2018, DVT, PE, on Xarelto, IVC filter, chronic leg swelling, and anasarca, Now send in to the ER by her PCP, Dr. Ross, for evaluation of  generalized weakness and hypotension BP of 80/40 during office visit. On admission, she found to have no fever and BP was in lower normal range and no Leukocytosis. The CXR is clear and CT abd/pelvis consistent with Ileus. The ID consult requested to assist with evaluation of infectious etiology of episode of hypotension. Found to have Bacteroides bacteremia and now developed septic shock on Cefepime and Flagyl. Hence transferred to ICU. 10/20/20.    # Hypotension  at office- BP of 80/40 - resolved   #  Bacteroides fragilis Bacteremia - 10/13/20 - ? source most likely GI- Repeat Blood Cxs have NGTD 10/16/20  # Ileus  - h/o recurrent SBO and s/p EXp. LAp  # COVID 19 negative   # Septic shock ( Hypothermia + hypotensive)- transferred to ICU since requiring pressor- source GI, The CT abd/pelvis shows nonspecific enterocolitis, noninfectious inflammatory bowel disease, or ischemic bowel, along with ileus.   # Pneumonia- HCAP vs Aspiration - on CXR 10/24 and CT chest 10/21- sputum Cx grew Methicillin resistant Staphylococcus aureus  and Stenotrophomonas maltophilia.  # S/p extubated 11/9/20  # Infected sacral ulcer- s/p debridement and culture grew Enterococcus, VRE and Candida, sensitivity is pending- The MRI of pelvis shows  Osteomyelitis of the lower sacrum and coccyx with overlying cutaneous ulceration.   # Fever- 11/8 and 11/19- BCX NGTD 11/19 - The CXR shows Improvement in bilateral airspace disease with persistent bibasilar consolidation. She is s/p removal of velazquez catheter and passed TOV.  # HCAP- 11/27/20,  Intermittent fever and CXR shows  bilateral consolidations right greater than left. - The procalcitonin level is 1.55  # s/p PICC line placement 11/30      would recommend:    1. Please discontinue Meropenem since completed the course  2. Continue Daptomycin to cover VRE  and  Fluconazole to cover Candida in sacral wound culture X 6  weeks to treat Osteomyelitis, might change to Linezolid for last two weeks of the course  3. Aspiration precaution  4. Frequent repositioning   5.OOB to chair       Attending Attestation:    Spent more than 40 minutes on total encounter, more than 50 % of the visit was spent counseling and/or coordinating care by the Attending physician.

## 2020-12-06 NOTE — PROGRESS NOTE ADULT - ASSESSMENT
Patient is a 65y old  Female from home, lives with daughter, ambulates with walker with PMH of recurrent SBO's, s/p exp lap, SB resection in 2015, ex lap, ALBINA in 2018, DVT, PE, on Xarelto, IVC filter, chronic leg swelling, and anasarca, Now send in to the ER by her PCP, Dr. Ross, for evaluation of  generalized weakness and hypotension BP of 80/40 during office visit. On admission, she found to have no fever and BP was in lower normal range and no Leukocytosis. The CXR is clear and CT abd/pelvis consistent with Ileus. The ID consult requested to assist with evaluation of infectious etiology of episode of hypotension. Found to have Bacteroides bacteremia and now developed septic shock on Cefepime and Flagyl. Hence transferred to ICU. 10/20/20.    # Hypotension  at office- BP of 80/40 - resolved   #  Bacteroides fragilis Bacteremia - 10/13/20 - ? source most likely GI- Repeat Blood Cxs have NGTD 10/16/20  # Ileus  - h/o recurrent SBO and s/p EXp. LAp  # COVID 19 negative   # Septic shock ( Hypothermia + hypotensive)- transferred to ICU since requiring pressor- source GI, The CT abd/pelvis shows nonspecific enterocolitis, noninfectious inflammatory bowel disease, or ischemic bowel, along with ileus.   # Pneumonia- HCAP vs Aspiration - on CXR 10/24 and CT chest 10/21- sputum Cx grew Methicillin resistant Staphylococcus aureus  and Stenotrophomonas maltophilia.  # S/p extubated 11/9/20  # Infected sacral ulcer- s/p debridement and culture grew Enterococcus, VRE and Candida, sensitivity is pending- The MRI of pelvis shows  Osteomyelitis of the lower sacrum and coccyx with overlying cutaneous ulceration.   # Fever- 11/8 and 11/19- BCX NGTD 11/19 - The CXR shows Improvement in bilateral airspace disease with persistent bibasilar consolidation. She is s/p removal of velazquez catheter and passed TOV.  # HCAP- 11/27/20,  Intermittent fever and CXR shows  bilateral consolidations right greater than left. - The procalcitonin level is 1.55  # s/p PICC line placement 11/30      would recommend:    1. Please discontinue Meropenem since completed the course  2. Continue Daptomycin to cover VRE  and  Fluconazole to cover Candida in sacral wound culture X 6  weeks to treat Osteomyelitis, might change to Linezolid for last two weeks of the course  3. Aspiration precaution  4. Frequent repositioning   5.OOB to chair       Attending Attestation:    Spent more than 40 minutes on total encounter, more than 50 % of the visit was spent counseling and/or coordinating care by the Attending physician. Patient is a 65y old  Female from home, lives with daughter, ambulates with walker with PMH of recurrent SBO's, s/p exp lap, SB resection in 2015, ex lap, LABINA in 2018, DVT, PE, on Xarelto, IVC filter, chronic leg swelling, and anasarca, Now send in to the ER by her PCP, Dr. Ross, for evaluation of  generalized weakness and hypotension BP of 80/40 during office visit. On admission, she found to have no fever and BP was in lower normal range and no Leukocytosis. The CXR is clear and CT abd/pelvis consistent with Ileus. The ID consult requested to assist with evaluation of infectious etiology of episode of hypotension. Found to have Bacteroides bacteremia and now developed septic shock on Cefepime and Flagyl. Hence transferred to ICU. 10/20/20.    # Hypotension  at office- BP of 80/40 - resolved   #  Bacteroides fragilis Bacteremia - 10/13/20 - ? source most likely GI- Repeat Blood Cxs have NGTD 10/16/20  # Ileus  - h/o recurrent SBO and s/p EXp. LAp  # COVID 19 negative   # Septic shock ( Hypothermia + hypotensive)- transferred to ICU since requiring pressor- source GI, The CT abd/pelvis shows nonspecific enterocolitis, noninfectious inflammatory bowel disease, or ischemic bowel, along with ileus.   # Pneumonia- HCAP vs Aspiration - on CXR 10/24 and CT chest 10/21- sputum Cx grew Methicillin resistant Staphylococcus aureus  and Stenotrophomonas maltophilia.  # S/p extubated 11/9/20  # Infected sacral ulcer- s/p debridement and culture grew Enterococcus, VRE and Candida, sensitivity is pending- The MRI of pelvis shows  Osteomyelitis of the lower sacrum and coccyx with overlying cutaneous ulceration.   # Fever- 11/8 and 11/19- BCX NGTD 11/19 - The CXR shows Improvement in bilateral airspace disease with persistent bibasilar consolidation. She is s/p removal of velazquez catheter and passed TOV.  # HCAP- 11/27/20,  Intermittent fever and CXR shows  bilateral consolidations right greater than left. - The procalcitonin level is 1.55  # s/p PICC line placement 11/30      would recommend:    1. OOB to chair   2. Continue Daptomycin to cover VRE  and  Fluconazole to cover Candida in sacral wound culture X 6  weeks to treat Osteomyelitis,  might change to Linezolid for last two weeks of the course, end date 12/29/20  3. Aspiration precaution  4. Frequent repositioning       Attending Attestation:    Spent more than 35 minutes on total encounter, more than 50 % of the visit was spent counseling and/or coordinating care by the Attending physician.

## 2020-12-06 NOTE — PROGRESS NOTE ADULT - SUBJECTIVE AND OBJECTIVE BOX
Patient was seen and examined  Patient is a 65y old  Female who presents with a chief complaint of Hypotension (05 Dec 2020 13:33)      INTERVAL HPI/OVERNIGHT EVENTS:  T(C): 36.9 (12-06-20 @ 05:00), Max: 37.7 (12-05-20 @ 20:30)  HR: 101 (12-06-20 @ 05:00) (95 - 102)  BP: 122/77 (12-06-20 @ 05:00) (102/66 - 122/77)  RR: 18 (12-06-20 @ 05:00) (17 - 18)  SpO2: 100% (12-06-20 @ 05:00) (100% - 100%)  Wt(kg): --  I&O's Summary      LABS:                        8.3    7.22  )-----------( 301      ( 06 Dec 2020 06:13 )             28.4     12-06    146<H>  |  109<H>  |  13  ----------------------------<  83  3.7   |  34<H>  |  0.64    Ca    8.7      06 Dec 2020 06:13  Phos  2.9     12-06  Mg     1.8     12-06    TPro  6.6  /  Alb  1.9<L>  /  TBili  1.5<H>  /  DBili  x   /  AST  57<H>  /  ALT  41  /  AlkPhos  265<H>  12-06        CAPILLARY BLOOD GLUCOSE      POCT Blood Glucose.: 91 mg/dL (06 Dec 2020 05:43)  POCT Blood Glucose.: 113 mg/dL (06 Dec 2020 00:58)  POCT Blood Glucose.: 145 mg/dL (05 Dec 2020 17:58)  POCT Blood Glucose.: 97 mg/dL (05 Dec 2020 11:39)              MEDICATIONS  (STANDING):  ascorbic acid 500 milliGRAM(s) Oral two times a day  chlorhexidine 2% Cloths 1 Application(s) Topical daily  DAPTOmycin IVPB 350 milliGRAM(s) IV Intermittent every 24 hours  enoxaparin Injectable 50 milliGRAM(s) SubCutaneous daily  fluconAZOLE IVPB 200 milliGRAM(s) IV Intermittent every 24 hours  fluconAZOLE IVPB      furosemide    Tablet 20 milliGRAM(s) Oral daily  meropenem  IVPB 500 milliGRAM(s) IV Intermittent every 8 hours  multivitamin/minerals 1 Tablet(s) Oral daily  nystatin Powder 1 Application(s) Topical two times a day  pantoprazole   Suspension 40 milliGRAM(s) Enteral Tube daily  rifAXIMin 550 milliGRAM(s) Oral two times a day  zinc sulfate 220 milliGRAM(s) Oral daily    MEDICATIONS  (PRN):  acetaminophen   Tablet .. 650 milliGRAM(s) Oral every 6 hours PRN Temp greater or equal to 38C (100.4F), Mild Pain (1 - 3), Moderate Pain (4 - 6)  ALBUTerol    90 MICROgram(s) HFA Inhaler 2 Puff(s) Inhalation every 6 hours PRN Shortness of Breath and/or Wheezing  sodium chloride 0.9% lock flush 10 milliLiter(s) IV Push every 1 hour PRN Pre/post blood products, medications, blood draw, and to maintain line patency      RADIOLOGY & ADDITIONAL TESTS:    Imaging Personally Reviewed:  [ ] YES  [ ] NO    REVIEW OF SYSTEMS:  CONSTITUTIONAL: No fever, weight loss, or fatigue  EYES: No eye pain, visual disturbances, or discharge  ENMT:  No difficulty hearing, tinnitus, vertigo; No sinus or throat pain  NECK: No pain or stiffness  BREASTS: No pain, masses, or nipple discharge  RESPIRATORY: No cough, wheezing, chills or hemoptysis; No shortness of breath  CARDIOVASCULAR: No chest pain, palpitations, dizziness, or leg swelling  GASTROINTESTINAL: No abdominal or epigastric pain. No nausea, vomiting, or hematemesis; No diarrhea or constipation. No melena or hematochezia.  GENITOURINARY: No dysuria, frequency, hematuria, or incontinence  NEUROLOGICAL: No headaches, memory loss, loss of strength, numbness, or tremors  SKIN: No itching, burning, rashes, or lesions   LYMPH NODES: No enlarged glands  ENDOCRINE: No heat or cold intolerance; No hair loss  MUSCULOSKELETAL: No joint pain or swelling; No muscle, back, or extremity pain  PSYCHIATRIC: No depression, anxiety, mood swings, or difficulty sleeping  HEME/LYMPH: No easy bruising, or bleeding gums  ALLERY AND IMMUNOLOGIC: No hives or eczema      Consultant(s) Notes Reviewed:  [ x ] YES  [ ] NO    PHYSICAL EXAM:  GENERAL: NAD, well-groomed, well-developed  HEAD:  Atraumatic, Normocephalic  EYES: EOMI, PERRLA, conjunctiva and sclera clear  ENMT: No tonsillar erythema, exudates, or enlargement; Moist mucous membranes, Good dentition, No lesions  NECK: Supple, No JVD, Normal thyroid  NERVOUS SYSTEM:  Alert & Oriented X3, Good concentration; Motor Strength 5/5 B/L upper and lower extremities; DTRs 2+ intact and symmetric  CHEST/LUNG: Clear to percussion bilaterally; No rales, rhonchi, wheezing, or rubs  HEART: Regular rate and rhythm; No murmurs, rubs, or gallops  ABDOMEN: Soft, Nontender, Nondistended; Bowel sounds present  EXTREMITIES:  2+ Peripheral Pulses, No clubbing, cyanosis, or edema  LYMPH: No lymphadenopathy noted  SKIN: No rashes or lesions    Care Discussed with Consultants/Other Providers [ x] YES  [ ] NO

## 2020-12-06 NOTE — PROGRESS NOTE ADULT - SUBJECTIVE AND OBJECTIVE BOX
MARIBETH PADILLA    SCU progress note    INTERVAL HPI/OVERNIGHT EVENTS: No acute events    DNR [X ]   DNI  [  ] With Trial of Intubation    Covid - 19 PCR:     The 4Ms    What Matters Most: see GOC  Age appropriate Medications/Screen for High Risk Medication: Yes  Mentation: see CAM below  Mobility: defer to physical exam    The Confusion Assessment Method (CAM) Diagnostic Algorithm     1: Acute Onset or Fluctuating Course  - Is there evidence of an acute change in mental status from the patient’s baseline? Did the (abnormal) behavior  fluctuate during the day, that is, tend to come and go, or increase and decrease in severity?  [ ] YES [x ] NO     2: Inattention  - Did the patient have difficulty focusing attention, being easily distractible, or having difficulty keeping track of what was being said?  [ ] YES [x ] NO     3: Disorganized thinking  -Was the patient’s thinking disorganized or incoherent, such as rambling or irrelevant conversation, unclear or illogical flow of ideas, or unpredictable switching from subject to subject?  [ ] YES [x ] NO    4: Altered Level of consciousness?  [ ] YES [x ] NO    The diagnosis of delirium by CAM requires the presence of features 1 and 2 and either 3 or 4.    PRESSORS: [ ] YES [x ] NO  DAPTOmycin IVPB 350 milliGRAM(s) IV Intermittent every 24 hours  fluconAZOLE IVPB 200 milliGRAM(s) IV Intermittent every 24 hours  fluconAZOLE IVPB      meropenem  IVPB 500 milliGRAM(s) IV Intermittent every 8 hours  rifAXIMin 550 milliGRAM(s) Oral two times a day    Cardiovascular:  Heart Failure  Acute   Acute on Chronic  Chronic       furosemide    Tablet 20 milliGRAM(s) Oral daily    Pulmonary:  ALBUTerol    90 MICROgram(s) HFA Inhaler 2 Puff(s) Inhalation every 6 hours PRN    Hematalogic:  enoxaparin Injectable 50 milliGRAM(s) SubCutaneous daily    Other:  acetaminophen   Tablet .. 650 milliGRAM(s) Oral every 6 hours PRN  ascorbic acid 500 milliGRAM(s) Oral two times a day  chlorhexidine 2% Cloths 1 Application(s) Topical daily  multivitamin/minerals 1 Tablet(s) Oral daily  nystatin Powder 1 Application(s) Topical two times a day  pantoprazole   Suspension 40 milliGRAM(s) Enteral Tube daily  sodium chloride 0.9% lock flush 10 milliLiter(s) IV Push every 1 hour PRN  zinc sulfate 220 milliGRAM(s) Oral daily    acetaminophen   Tablet .. 650 milliGRAM(s) Oral every 6 hours PRN  ALBUTerol    90 MICROgram(s) HFA Inhaler 2 Puff(s) Inhalation every 6 hours PRN  ascorbic acid 500 milliGRAM(s) Oral two times a day  chlorhexidine 2% Cloths 1 Application(s) Topical daily  DAPTOmycin IVPB 350 milliGRAM(s) IV Intermittent every 24 hours  enoxaparin Injectable 50 milliGRAM(s) SubCutaneous daily  fluconAZOLE IVPB 200 milliGRAM(s) IV Intermittent every 24 hours  fluconAZOLE IVPB      furosemide    Tablet 20 milliGRAM(s) Oral daily  meropenem  IVPB 500 milliGRAM(s) IV Intermittent every 8 hours  multivitamin/minerals 1 Tablet(s) Oral daily  nystatin Powder 1 Application(s) Topical two times a day  pantoprazole   Suspension 40 milliGRAM(s) Enteral Tube daily  rifAXIMin 550 milliGRAM(s) Oral two times a day  sodium chloride 0.9% lock flush 10 milliLiter(s) IV Push every 1 hour PRN  zinc sulfate 220 milliGRAM(s) Oral daily    Drug Dosing Weight  Height (cm): 167.6 (21 Oct 2020 02:26)  Weight (kg): 56.2 (21 Oct 2020 02:26)  BMI (kg/m2): 20 (21 Oct 2020 02:26)  BSA (m2): 1.63 (21 Oct 2020 02:26)    CENTRAL LINE: [ ] YES [x ] NO  LOCATION:   DATE INSERTED:  REMOVE: [ ] YES [ ] NO  EXPLAIN:    TREJO: [ ] YES [x ] NO    DATE INSERTED:  REMOVE:  [ ] YES [ ] NO  EXPLAIN:    PAST MEDICAL & SURGICAL HISTORY:  Anasarca    Pulmonary embolism    DVT (deep venous thrombosis)    SBO (small bowel obstruction)    H/O exploratory laparotomy                      PHYSICAL EXAM:    HEAD:  Atraumatic, Normocephalic  EYES: EOMI, PERRLA, conjunctiva and sclera clear  ENMT: No tonsillar erythema, exudates  NECK: Supple, No JVD  NERVOUS SYSTEM:  Alert & Oriented X2. Follows simple commands. Moving all extremities  CHEST/LUNG: B/L CTA  HEART: Regular rate and rhythm; No murmurs, rubs, or gallops  ABDOMEN: Soft, Nontender, Nondistended; Bowel sounds present  EXTREMITIES:  2+ Peripheral Pulses, No clubbing, cyanosis, or edema  LYMPH: No lymphadenopathy noted  SKIN: Unstageable sacrum    LABS:  CBC Full  -  ( 06 Dec 2020 06:13 )  WBC Count : 7.22 K/uL  RBC Count : 2.92 M/uL  Hemoglobin : 8.3 g/dL  Hematocrit : 28.4 %  Platelet Count - Automated : 301 K/uL  Mean Cell Volume : 97.3 fl  Mean Cell Hemoglobin : 28.4 pg  Mean Cell Hemoglobin Concentration : 29.2 gm/dL  Auto Neutrophil # : 4.87 K/uL  Auto Lymphocyte # : 1.52 K/uL  Auto Monocyte # : 0.51 K/uL  Auto Eosinophil # : 0.25 K/uL  Auto Basophil # : 0.05 K/uL  Auto Neutrophil % : 67.3 %  Auto Lymphocyte % : 21.1 %  Auto Monocyte % : 7.1 %  Auto Eosinophil % : 3.5 %  Auto Basophil % : 0.7 %    12-06    146<H>  |  109<H>  |  13  ----------------------------<  83  3.7   |  34<H>  |  0.64    Ca    8.7      06 Dec 2020 06:13  Phos  2.9     12-06  Mg     1.8     12-06    TPro  6.6  /  Alb  1.9<L>  /  TBili  1.5<H>  /  DBili  x   /  AST  57<H>  /  ALT  41  /  AlkPhos  265<H>  12-06              [  ]  DVT Prophylaxis  [  ]  Nutrition, Brand, Rate         Goal Rate        Abnormal Nutritional Status -  Malnutrition   Cachexia      Morbid Obesity BMI >/=40    RADIOLOGY & ADDITIONAL STUDIES:  ***    Goals of Care Discussion with Family/Proxy/Other   - see note from/family meeting set up for...     MARIBETH PADILLA    SCU progress note    INTERVAL HPI/OVERNIGHT EVENTS: No acute events    DNR [X ]   DNI  [  ] With Trial of Intubation    Covid - 19 PCR:     The 4Ms    What Matters Most: see GOC  Age appropriate Medications/Screen for High Risk Medication: Yes  Mentation: see CAM below  Mobility: defer to physical exam    The Confusion Assessment Method (CAM) Diagnostic Algorithm     1: Acute Onset or Fluctuating Course  - Is there evidence of an acute change in mental status from the patient’s baseline? Did the (abnormal) behavior  fluctuate during the day, that is, tend to come and go, or increase and decrease in severity?  [ ] YES [x ] NO     2: Inattention  - Did the patient have difficulty focusing attention, being easily distractible, or having difficulty keeping track of what was being said?  [ ] YES [x ] NO     3: Disorganized thinking  -Was the patient’s thinking disorganized or incoherent, such as rambling or irrelevant conversation, unclear or illogical flow of ideas, or unpredictable switching from subject to subject?  [ ] YES [x ] NO    4: Altered Level of consciousness?  [ ] YES [x ] NO    The diagnosis of delirium by CAM requires the presence of features 1 and 2 and either 3 or 4.    PRESSORS: [ ] YES [x ] NO  DAPTOmycin IVPB 350 milliGRAM(s) IV Intermittent every 24 hours  fluconAZOLE IVPB 200 milliGRAM(s) IV Intermittent every 24 hours  fluconAZOLE IVPB      meropenem  IVPB 500 milliGRAM(s) IV Intermittent every 8 hours  rifAXIMin 550 milliGRAM(s) Oral two times a day    Cardiovascular:  Heart Failure  Acute   Acute on Chronic  Chronic       furosemide    Tablet 20 milliGRAM(s) Oral daily    Pulmonary:  ALBUTerol    90 MICROgram(s) HFA Inhaler 2 Puff(s) Inhalation every 6 hours PRN    Hematalogic:  enoxaparin Injectable 50 milliGRAM(s) SubCutaneous daily    Other:  acetaminophen   Tablet .. 650 milliGRAM(s) Oral every 6 hours PRN  ascorbic acid 500 milliGRAM(s) Oral two times a day  chlorhexidine 2% Cloths 1 Application(s) Topical daily  multivitamin/minerals 1 Tablet(s) Oral daily  nystatin Powder 1 Application(s) Topical two times a day  pantoprazole   Suspension 40 milliGRAM(s) Enteral Tube daily  sodium chloride 0.9% lock flush 10 milliLiter(s) IV Push every 1 hour PRN  zinc sulfate 220 milliGRAM(s) Oral daily    acetaminophen   Tablet .. 650 milliGRAM(s) Oral every 6 hours PRN  ALBUTerol    90 MICROgram(s) HFA Inhaler 2 Puff(s) Inhalation every 6 hours PRN  ascorbic acid 500 milliGRAM(s) Oral two times a day  chlorhexidine 2% Cloths 1 Application(s) Topical daily  DAPTOmycin IVPB 350 milliGRAM(s) IV Intermittent every 24 hours  enoxaparin Injectable 50 milliGRAM(s) SubCutaneous daily  fluconAZOLE IVPB 200 milliGRAM(s) IV Intermittent every 24 hours  fluconAZOLE IVPB      furosemide    Tablet 20 milliGRAM(s) Oral daily  meropenem  IVPB 500 milliGRAM(s) IV Intermittent every 8 hours  multivitamin/minerals 1 Tablet(s) Oral daily  nystatin Powder 1 Application(s) Topical two times a day  pantoprazole   Suspension 40 milliGRAM(s) Enteral Tube daily  rifAXIMin 550 milliGRAM(s) Oral two times a day  sodium chloride 0.9% lock flush 10 milliLiter(s) IV Push every 1 hour PRN  zinc sulfate 220 milliGRAM(s) Oral daily    Drug Dosing Weight  Height (cm): 167.6 (21 Oct 2020 02:26)  Weight (kg): 56.2 (21 Oct 2020 02:26)  BMI (kg/m2): 20 (21 Oct 2020 02:26)  BSA (m2): 1.63 (21 Oct 2020 02:26)    CENTRAL LINE: [ ] YES [x ] NO  LOCATION:   DATE INSERTED:  REMOVE: [ ] YES [ ] NO  EXPLAIN:    TREJO: [ ] YES [x ] NO    DATE INSERTED:  REMOVE:  [ ] YES [ ] NO  EXPLAIN:    PAST MEDICAL & SURGICAL HISTORY:  Anasarca    Pulmonary embolism    DVT (deep venous thrombosis)    SBO (small bowel obstruction)    H/O exploratory laparotomy                      PHYSICAL EXAM:    HEAD:  Atraumatic, Normocephalic  EYES: EOMI, PERRLA, conjunctiva and sclera clear  ENMT: No tonsillar erythema, exudates  NECK: Supple, No JVD  NERVOUS SYSTEM:  Alert & Oriented X2. Follows simple commands. Moving all extremities  CHEST/LUNG: B/L CTA  HEART: Regular rate and rhythm; No murmurs, rubs, or gallops  ABDOMEN: Soft, Nontender, Nondistended; Bowel sounds present  EXTREMITIES:  2+ Peripheral Pulses, No clubbing, cyanosis, or edema  LYMPH: No lymphadenopathy noted  SKIN: Unstageable sacrum.    LABS:  CBC Full  -  ( 06 Dec 2020 06:13 )  WBC Count : 7.22 K/uL  RBC Count : 2.92 M/uL  Hemoglobin : 8.3 g/dL  Hematocrit : 28.4 %  Platelet Count - Automated : 301 K/uL  Mean Cell Volume : 97.3 fl  Mean Cell Hemoglobin : 28.4 pg  Mean Cell Hemoglobin Concentration : 29.2 gm/dL  Auto Neutrophil # : 4.87 K/uL  Auto Lymphocyte # : 1.52 K/uL  Auto Monocyte # : 0.51 K/uL  Auto Eosinophil # : 0.25 K/uL  Auto Basophil # : 0.05 K/uL  Auto Neutrophil % : 67.3 %  Auto Lymphocyte % : 21.1 %  Auto Monocyte % : 7.1 %  Auto Eosinophil % : 3.5 %  Auto Basophil % : 0.7 %    12-06    146<H>  |  109<H>  |  13  ----------------------------<  83  3.7   |  34<H>  |  0.64    Ca    8.7      06 Dec 2020 06:13  Phos  2.9     12-06  Mg     1.8     12-06    TPro  6.6  /  Alb  1.9<L>  /  TBili  1.5<H>  /  DBili  x   /  AST  57<H>  /  ALT  41  /  AlkPhos  265<H>  12-06              [  ]  DVT Prophylaxis  [  ]  Nutrition, Brand, Rate         Goal Rate        Abnormal Nutritional Status -  Malnutrition   Cachexia      Morbid Obesity BMI >/=40    RADIOLOGY & ADDITIONAL STUDIES:  ***    Goals of Care Discussion with Family/Proxy/Other   - see note from/family meeting set up for...

## 2020-12-06 NOTE — PROGRESS NOTE ADULT - PROBLEM SELECTOR PLAN 9
DVT and GI prophylaxis  Continue rifaximin  Dapto for total of 6 weeks with end date 12/28/2020 (11/17 - 12/28/2020- weekly CK while on Dapto.   F/U AM CK  S/P PICC line placement yesterday  Discharge planning underway.

## 2020-12-06 NOTE — PROGRESS NOTE ADULT - SUBJECTIVE AND OBJECTIVE BOX
Patient is seen and examined at the bed side, remains afebrile The WBC count and creatinine stay normal.      REVIEW OF SYSTEMS: All other review systems are negative      ALLERGIES: No Known Allergies      Vital Signs Last 24 Hrs  T(C): 36.2 (06 Dec 2020 13:49), Max: 37.7 (05 Dec 2020 20:30)  T(F): 97.2 (06 Dec 2020 13:49), Max: 99.9 (05 Dec 2020 20:30)  HR: 94 (06 Dec 2020 13:49) (94 - 102)  BP: 113/62 (06 Dec 2020 13:49) (103/61 - 122/77)  BP(mean): --  RR: 18 (06 Dec 2020 13:49) (18 - 18)  SpO2: 100% (06 Dec 2020 13:49) (100% - 100%)      PHYSICAL EXAM:  GENERAL: Not in distress, on oxygen via NC  CHEST/LUNG: Not using accessory muscles   HEART: s1 and s2 present  ABDOMEN:  Nontender and  Nondistended  EXTREMITIES: B/L UE edematous improved  CNS: Awake and Alert         LABS:                        8.3    7.22  )-----------( 301      ( 06 Dec 2020 06:13 )             28.4                9.9    9.41  )-----------( 269      ( 24 Nov 2020 06:38 )             31.5                           9.4    13.14 )-----------( 149      ( 14 Nov 2020 07:58 )             28.7         12-06    146<H>  |  109<H>  |  13  ----------------------------<  83  3.7   |  34<H>  |  0.64    Ca    8.7      06 Dec 2020 06:13  Phos  2.9     12-06  Mg     1.8     12-06    TPro  6.6  /  Alb  1.9<L>  /  TBili  1.5<H>  /  DBili  x   /  AST  57<H>  /  ALT  41  /  AlkPhos  265<H>  12-06    12-05    145  |  107  |  14  ----------------------------<  92  3.2<L>   |  35<H>  |  0.66    Ca    8.7      05 Dec 2020 06:38  Phos  3.5     12-05  Mg     1.9     12-05    TPro  6.5  /  Alb  1.9<L>  /  TBili  1.5<H>  /  DBili  x   /  AST  68<H>  /  ALT  48  /  AlkPhos  259<H>  12-05      11-06    138  |  104  |  37<H>  ----------------------------<  81  3.9   |  25  |  2.37<H>    Ca    8.1<L>      06 Nov 2020 06:20  Phos  3.4     11-06  Mg     2.0     11-06    TPro  5.1<L>  /  Alb  2.8<L>  /  TBili  9.2<H>  /  DBili  x   /  AST  233<H>  /  ALT  99<H>  /  AlkPhos  96  11-06      Vancomycin Level, Trough (11.07.20 @ 21:49)   Vancomycin Level, Trough: 16.1:     Vancomycin Level, Trough (11.07.20 @ 07:08) : 19.5  Vancomycin Level, Trough (11.06.20 @ 06:20) : 19.9:   Procalcitonin, Serum (11.28.20 @ 15:38) : 1.55:       MEDICATIONS  (STANDING):    ascorbic acid 500 milliGRAM(s) Oral two times a day  chlorhexidine 2% Cloths 1 Application(s) Topical daily  DAPTOmycin IVPB 350 milliGRAM(s) IV Intermittent every 24 hours  enoxaparin Injectable 50 milliGRAM(s) SubCutaneous daily  fluconAZOLE IVPB 200 milliGRAM(s) IV Intermittent every 24 hours  fluconAZOLE IVPB      furosemide    Tablet 20 milliGRAM(s) Oral daily  meropenem  IVPB 500 milliGRAM(s) IV Intermittent every 8 hours  multivitamin/minerals 1 Tablet(s) Oral daily  nystatin Powder 1 Application(s) Topical two times a day  pantoprazole   Suspension 40 milliGRAM(s) Enteral Tube daily  rifAXIMin 550 milliGRAM(s) Oral two times a day  zinc sulfate 220 milliGRAM(s) Oral daily      RADIOLOGY & ADDITIONAL TESTS:    11/27/20 : Xray Chest 1 View- PORTABLE-Urgent (Xray Chest 1 View- PORTABLE-Urgent .) (11.27.20 @ 06:53) Increased interstitial lung markings with bilateral consolidations right greater than left. Small right pleural effusion. Overall worsening compared to prior exam.      11/24/20 : MR Pelvis Bony Only No Cont (11.24.20 @ 18:02) : Osteomyelitis of the lower sacrum and coccyx with overlying cutaneous ulceration,      11/19/20 : Xray Chest 1 View- PORTABLE-Urgent (Xray Chest 1 View- PORTABLE-Urgent .) (11.19.20 @ 23:53): Improvement in bilateral airspace disease with persistent bibasilar consolidation and small effusions.    11/4/20 : Xray Chest 1 View- PORTABLE-Routine (Xray Chest 1 View- PORTABLE-Routine in AM.) (11.04.20 @ 10:59) Reexpansion of the left lung with residual left pleural effusion and/or infiltrate.  Right pulmonary edema unchanged.  Tubes and catheters in satisfactory position.      10/25/20: Xray Chest 1 View- PORTABLE-Routine (Xray Chest 1 View- PORTABLE-Routine in AM.) (10.25.20 @ 09:21) Frontal expiratory view of the chest shows the heart to be similar in size. Endotracheal tube, right jugular line and feeding tube remain present. The lungs show similar left upper lobe infiltrate with progression of right perihilar infiltrate. Pleural effusions are similar. There is no evidence of pneumothorax.    10/21/20 : CT Abdomen and Pelvis w/ Oral Cont and w/ IV Cont (10.21.20 @ 15:59) Mural thickening of the left colon and rectum. Liquid stool in the colon. Apparent segmental mural thickening of the mid to distal small bowel and the proximal duodenum. Findings may represent nonspecific enterocolitis, noninfectious inflammatory bowel disease, or ischemic bowel. Clinical correlation is recommended. The celiac axis artery, SMA, and KINA are patent without stenosis.    Dilatation of the mid small bowel is again noted. Oral contrast has reached the terminal ileum. Findings may represent small bowel obstruction, or ileus related to nonspecific enterocolitis.   Cholelithiasis. Moderate to large ascites in the abdomen, increased since the previous examination. 5 mm nonspecific noncalcified left upper lobe lung nodule; if the patient's is in the high risk category (i.e. smoker), follow-up chest CT may be pursued in 12 months to ensure stability. Combination of atelectasis and consolidation in the left lower lobe.  Possible 1.0 cm hypodense lesion in the left lobe of the thyroid. Thyroid ultrasound may be pursued for further evaluation.   Mild bilateral pleural effusions.  Aging determinate compression fracture at T5 vertebra.        MICROBIOLOGY DATA:    MRSA/MSSA PCR (11.28.20 @ 11:34)   MRSA PCR Result.: Detected:     Urine Microscopic-Add On (NC) (11.20.20 @ 04:12)   Red Blood Cell - Urine: 5-10 /HPF   White Blood Cell - Urine: 11-25 /HPF   Calcium Oxalate Crystals: Few /HPF   Bacteria: Moderate /HPF   Comment - Urine: few amorphous urates   Epithelial Cells: Few /HPF     Culture - Blood (11.19.20 @ 10:19)   Specimen Source: .Blood Blood-Peripheral   Culture Results: No growth to date.     Culture - Surgical Swab (11.12.20 @ 05:01)   - Ampicillin: R >8 Predicts results to ampicillin/sulbactam, amoxacillin-clavulanate and piperacillin-tazobactam.   - Levofloxacin: R >4   - Linezolid: S 1   - Tetra/Doxy: R >8   - Vancomycin: R >16   Specimen Source: .Surgical Swab Sacral decub   Culture Results:   Few Enterococcus faecium (vancomycin resistant)   Rare Candida albicans "Susceptibilities not performed"   Organism Identification: Enterococcus faecium (vancomycin resistant)   Organism: Enterococcus faecium (vancomycin resistant)   Method Type: STEWART     Culture - Surgical Swab (11.12.20 @ 05:01)   Specimen Source: .Surgical Swab Sacral decub   Culture Results:  Few Enterococcus faecium Susceptibility to follow.   Rare Candida albicans "Susceptibilities not performed"     Culture - Sputum . (10.26.20 @ 00:26)   - Ampicillin/Sulbactam: R <=8/4   - Cefazolin: R >16   - Ceftazidime: I 16   - Clindamycin: R >4   - Erythromycin: R >4   - Gentamicin: S <=1 Should not be used as monotherapy   - Levofloxacin: S <=0.5   - Linezolid: S 2   - Oxacillin: R >2   - Penicillin: R >8   - RIF- Rifampin: S <=1 Should not be used as monotherapy   - Tetra/Doxy: S <=1   - Trimethoprim/Sulfamethoxazole: S <=0.5/9.5   - Trimethoprim/Sulfamethoxazole: S <=0.5/9.5   - Vancomycin: S 1     MRSA/MSSA PCR (10.21.20 @ 09:41)   MRSA PCR Result.: Detected:       Culture - Blood (10.20.20 @ 22:09)   Specimen Source: .Blood Blood   Culture Results:  No growth to date.     Culture - Blood (10.20.20 @ 22:09)   Specimen Source: .Blood Blood   Culture Results:   No growth to date.     Culture - Blood in AM (10.16.20 @ 10:13)   Specimen Source: .Blood Blood-Peripheral   Culture Results: No growth to date.     Culture - Blood in AM (10.16.20 @ 10:13)   Specimen Source: .Blood Blood-Peripheral   Culture Results: No growth to date.     Culture - Blood (10.13.20 @ 22:23)   Growth in anaerobic bottle: Gram Negative Rods   Specimen Source: .Blood Blood-Peripheral   Organism: Blood Culture PCR   Culture Results: Growth in anaerobic bottle: Bacteroides fragilis   "Susceptibilities not performed"     Culture - Blood (10.13.20 @ 22:23)   Specimen Source: .Blood Blood-Peripheral   Culture Results:   No growth to date.            Patient is seen and examined at the bed side, remains afebrile.  No new events.  The WBC count and creatinine stay normal.      REVIEW OF SYSTEMS: All other review systems are negative      ALLERGIES: No Known Allergies      Vital Signs Last 24 Hrs  T(C): 36.2 (06 Dec 2020 13:49), Max: 37.7 (05 Dec 2020 20:30)  T(F): 97.2 (06 Dec 2020 13:49), Max: 99.9 (05 Dec 2020 20:30)  HR: 94 (06 Dec 2020 13:49) (94 - 102)  BP: 113/62 (06 Dec 2020 13:49) (103/61 - 122/77)  BP(mean): --  RR: 18 (06 Dec 2020 13:49) (18 - 18)  SpO2: 100% (06 Dec 2020 13:49) (100% - 100%)      PHYSICAL EXAM:  GENERAL: Not in distress, on oxygen via NC  CHEST/LUNG: Not using accessory muscles   HEART: s1 and s2 present  ABDOMEN:  Nontender and  Nondistended  EXTREMITIES: B/L UE edematous improved  CNS: Awake and Alert         LABS:                        8.3    7.22  )-----------( 301      ( 06 Dec 2020 06:13 )             28.4                9.9    9.41  )-----------( 269      ( 24 Nov 2020 06:38 )             31.5                           9.4    13.14 )-----------( 149      ( 14 Nov 2020 07:58 )             28.7         12-06    146<H>  |  109<H>  |  13  ----------------------------<  83  3.7   |  34<H>  |  0.64    Ca    8.7      06 Dec 2020 06:13  Phos  2.9     12-06  Mg     1.8     12-06    TPro  6.6  /  Alb  1.9<L>  /  TBili  1.5<H>  /  DBili  x   /  AST  57<H>  /  ALT  41  /  AlkPhos  265<H>  12-06    12-05    145  |  107  |  14  ----------------------------<  92  3.2<L>   |  35<H>  |  0.66    Ca    8.7      05 Dec 2020 06:38  Phos  3.5     12-05  Mg     1.9     12-05    TPro  6.5  /  Alb  1.9<L>  /  TBili  1.5<H>  /  DBili  x   /  AST  68<H>  /  ALT  48  /  AlkPhos  259<H>  12-05      11-06    138  |  104  |  37<H>  ----------------------------<  81  3.9   |  25  |  2.37<H>    Ca    8.1<L>      06 Nov 2020 06:20  Phos  3.4     11-06  Mg     2.0     11-06    TPro  5.1<L>  /  Alb  2.8<L>  /  TBili  9.2<H>  /  DBili  x   /  AST  233<H>  /  ALT  99<H>  /  AlkPhos  96  11-06      Vancomycin Level, Trough (11.07.20 @ 21:49)   Vancomycin Level, Trough: 16.1:     Vancomycin Level, Trough (11.07.20 @ 07:08) : 19.5  Vancomycin Level, Trough (11.06.20 @ 06:20) : 19.9:   Procalcitonin, Serum (11.28.20 @ 15:38) : 1.55:       MEDICATIONS  (STANDING):    ascorbic acid 500 milliGRAM(s) Oral two times a day  chlorhexidine 2% Cloths 1 Application(s) Topical daily  DAPTOmycin IVPB 350 milliGRAM(s) IV Intermittent every 24 hours  enoxaparin Injectable 50 milliGRAM(s) SubCutaneous daily  fluconAZOLE IVPB 200 milliGRAM(s) IV Intermittent every 24 hours  fluconAZOLE IVPB      furosemide    Tablet 20 milliGRAM(s) Oral daily  meropenem  IVPB 500 milliGRAM(s) IV Intermittent every 8 hours  multivitamin/minerals 1 Tablet(s) Oral daily  nystatin Powder 1 Application(s) Topical two times a day  pantoprazole   Suspension 40 milliGRAM(s) Enteral Tube daily  rifAXIMin 550 milliGRAM(s) Oral two times a day  zinc sulfate 220 milliGRAM(s) Oral daily      RADIOLOGY & ADDITIONAL TESTS:    11/27/20 : Xray Chest 1 View- PORTABLE-Urgent (Xray Chest 1 View- PORTABLE-Urgent .) (11.27.20 @ 06:53) Increased interstitial lung markings with bilateral consolidations right greater than left. Small right pleural effusion. Overall worsening compared to prior exam.      11/24/20 : MR Pelvis Bony Only No Cont (11.24.20 @ 18:02) : Osteomyelitis of the lower sacrum and coccyx with overlying cutaneous ulceration,      11/19/20 : Xray Chest 1 View- PORTABLE-Urgent (Xray Chest 1 View- PORTABLE-Urgent .) (11.19.20 @ 23:53): Improvement in bilateral airspace disease with persistent bibasilar consolidation and small effusions.    11/4/20 : Xray Chest 1 View- PORTABLE-Routine (Xray Chest 1 View- PORTABLE-Routine in AM.) (11.04.20 @ 10:59) Reexpansion of the left lung with residual left pleural effusion and/or infiltrate.  Right pulmonary edema unchanged.  Tubes and catheters in satisfactory position.      10/25/20: Xray Chest 1 View- PORTABLE-Routine (Xray Chest 1 View- PORTABLE-Routine in AM.) (10.25.20 @ 09:21) Frontal expiratory view of the chest shows the heart to be similar in size. Endotracheal tube, right jugular line and feeding tube remain present. The lungs show similar left upper lobe infiltrate with progression of right perihilar infiltrate. Pleural effusions are similar. There is no evidence of pneumothorax.    10/21/20 : CT Abdomen and Pelvis w/ Oral Cont and w/ IV Cont (10.21.20 @ 15:59) Mural thickening of the left colon and rectum. Liquid stool in the colon. Apparent segmental mural thickening of the mid to distal small bowel and the proximal duodenum. Findings may represent nonspecific enterocolitis, noninfectious inflammatory bowel disease, or ischemic bowel. Clinical correlation is recommended. The celiac axis artery, SMA, and KINA are patent without stenosis.    Dilatation of the mid small bowel is again noted. Oral contrast has reached the terminal ileum. Findings may represent small bowel obstruction, or ileus related to nonspecific enterocolitis.   Cholelithiasis. Moderate to large ascites in the abdomen, increased since the previous examination. 5 mm nonspecific noncalcified left upper lobe lung nodule; if the patient's is in the high risk category (i.e. smoker), follow-up chest CT may be pursued in 12 months to ensure stability. Combination of atelectasis and consolidation in the left lower lobe.  Possible 1.0 cm hypodense lesion in the left lobe of the thyroid. Thyroid ultrasound may be pursued for further evaluation.   Mild bilateral pleural effusions.  Aging determinate compression fracture at T5 vertebra.        MICROBIOLOGY DATA:    MRSA/MSSA PCR (11.28.20 @ 11:34)   MRSA PCR Result.: Detected:     Urine Microscopic-Add On (NC) (11.20.20 @ 04:12)   Red Blood Cell - Urine: 5-10 /HPF   White Blood Cell - Urine: 11-25 /HPF   Calcium Oxalate Crystals: Few /HPF   Bacteria: Moderate /HPF   Comment - Urine: few amorphous urates   Epithelial Cells: Few /HPF     Culture - Blood (11.19.20 @ 10:19)   Specimen Source: .Blood Blood-Peripheral   Culture Results: No growth to date.     Culture - Surgical Swab (11.12.20 @ 05:01)   - Ampicillin: R >8 Predicts results to ampicillin/sulbactam, amoxacillin-clavulanate and piperacillin-tazobactam.   - Levofloxacin: R >4   - Linezolid: S 1   - Tetra/Doxy: R >8   - Vancomycin: R >16   Specimen Source: .Surgical Swab Sacral decub   Culture Results:   Few Enterococcus faecium (vancomycin resistant)   Rare Candida albicans "Susceptibilities not performed"   Organism Identification: Enterococcus faecium (vancomycin resistant)   Organism: Enterococcus faecium (vancomycin resistant)   Method Type: STEWART     Culture - Surgical Swab (11.12.20 @ 05:01)   Specimen Source: .Surgical Swab Sacral decub   Culture Results:  Few Enterococcus faecium Susceptibility to follow.   Rare Candida albicans "Susceptibilities not performed"     Culture - Sputum . (10.26.20 @ 00:26)   - Ampicillin/Sulbactam: R <=8/4   - Cefazolin: R >16   - Ceftazidime: I 16   - Clindamycin: R >4   - Erythromycin: R >4   - Gentamicin: S <=1 Should not be used as monotherapy   - Levofloxacin: S <=0.5   - Linezolid: S 2   - Oxacillin: R >2   - Penicillin: R >8   - RIF- Rifampin: S <=1 Should not be used as monotherapy   - Tetra/Doxy: S <=1   - Trimethoprim/Sulfamethoxazole: S <=0.5/9.5   - Trimethoprim/Sulfamethoxazole: S <=0.5/9.5   - Vancomycin: S 1     MRSA/MSSA PCR (10.21.20 @ 09:41)   MRSA PCR Result.: Detected:       Culture - Blood (10.20.20 @ 22:09)   Specimen Source: .Blood Blood   Culture Results:  No growth to date.     Culture - Blood (10.20.20 @ 22:09)   Specimen Source: .Blood Blood   Culture Results:   No growth to date.     Culture - Blood in AM (10.16.20 @ 10:13)   Specimen Source: .Blood Blood-Peripheral   Culture Results: No growth to date.     Culture - Blood in AM (10.16.20 @ 10:13)   Specimen Source: .Blood Blood-Peripheral   Culture Results: No growth to date.     Culture - Blood (10.13.20 @ 22:23)   Growth in anaerobic bottle: Gram Negative Rods   Specimen Source: .Blood Blood-Peripheral   Organism: Blood Culture PCR   Culture Results: Growth in anaerobic bottle: Bacteroides fragilis   "Susceptibilities not performed"     Culture - Blood (10.13.20 @ 22:23)   Specimen Source: .Blood Blood-Peripheral   Culture Results:   No growth to date.

## 2020-12-06 NOTE — PROGRESS NOTE ADULT - PROBLEM SELECTOR PLAN 1
Secondary to bacteremia . Bacteroides fragilis per BC on 10/13  Sputum positive for MRSA and Stenotrophomonas  Aspiration pneumonia vs HCAP .   Repeat BC 10/16 and 11/19 NGTD ,UC on 11/20 NGTD   Infected sacral ulcer- s/p debridement and culture grew Enterococcus (VRE) and Candida ( sensitivity is pending)  Leukocytosis resolved, Cdiff negative  MRI sacrum/pelvis shows OM   c/w abx per ID   `PICC  for extended abx (for total of 6 weeks, thru Dec 28th).

## 2020-12-07 NOTE — PROGRESS NOTE ADULT - SUBJECTIVE AND OBJECTIVE BOX
Patient was seen and examined  Patient is a 65y old  Female who presents with a chief complaint of Hypotension (07 Dec 2020 16:19)      INTERVAL HPI/OVERNIGHT EVENTS:  T(C): 36.7 (12-07-20 @ 14:24), Max: 36.9 (12-07-20 @ 04:55)  HR: 96 (12-07-20 @ 14:24) (96 - 114)  BP: 119/70 (12-07-20 @ 14:24) (104/59 - 119/70)  RR: 18 (12-07-20 @ 14:24) (18 - 19)  SpO2: 100% (12-07-20 @ 14:24) (100% - 100%)  Wt(kg): --  I&O's Summary    07 Dec 2020 07:01  -  07 Dec 2020 17:02  --------------------------------------------------------  IN: 550 mL / OUT: 0 mL / NET: 550 mL        LABS:                        8.2    7.09  )-----------( 280      ( 07 Dec 2020 07:22 )             27.6     12-07    147<H>  |  110<H>  |  12  ----------------------------<  84  4.0   |  32<H>  |  0.64    Ca    8.4      07 Dec 2020 07:22  Phos  2.8     12-07  Mg     1.8     12-07    TPro  6.3  /  Alb  1.7<L>  /  TBili  1.4<H>  /  DBili  x   /  AST  58<H>  /  ALT  42  /  AlkPhos  279<H>  12-07        CAPILLARY BLOOD GLUCOSE      POCT Blood Glucose.: 120 mg/dL (07 Dec 2020 12:06)  POCT Blood Glucose.: 111 mg/dL (07 Dec 2020 06:20)  POCT Blood Glucose.: 121 mg/dL (06 Dec 2020 23:52)  POCT Blood Glucose.: 120 mg/dL (06 Dec 2020 21:46)  POCT Blood Glucose.: 130 mg/dL (06 Dec 2020 17:27)              MEDICATIONS  (STANDING):  ascorbic acid 500 milliGRAM(s) Oral two times a day  chlorhexidine 2% Cloths 1 Application(s) Topical daily  DAPTOmycin IVPB 350 milliGRAM(s) IV Intermittent every 24 hours  dextrose 5%. 1000 milliLiter(s) (40 mL/Hr) IV Continuous <Continuous>  enoxaparin Injectable 50 milliGRAM(s) SubCutaneous daily  fluconAZOLE IVPB 200 milliGRAM(s) IV Intermittent every 24 hours  fluconAZOLE IVPB      furosemide    Tablet 20 milliGRAM(s) Oral daily  lactobacillus acidophilus 1 Tablet(s) Oral daily  multivitamin/minerals 1 Tablet(s) Oral daily  nystatin Powder 1 Application(s) Topical two times a day  pantoprazole   Suspension 40 milliGRAM(s) Enteral Tube daily  rifAXIMin 550 milliGRAM(s) Oral two times a day  zinc sulfate 220 milliGRAM(s) Oral daily    MEDICATIONS  (PRN):  acetaminophen   Tablet .. 650 milliGRAM(s) Oral every 6 hours PRN Temp greater or equal to 38C (100.4F), Mild Pain (1 - 3), Moderate Pain (4 - 6)  ALBUTerol    90 MICROgram(s) HFA Inhaler 2 Puff(s) Inhalation every 6 hours PRN Shortness of Breath and/or Wheezing  sodium chloride 0.9% lock flush 10 milliLiter(s) IV Push every 1 hour PRN Pre/post blood products, medications, blood draw, and to maintain line patency      RADIOLOGY & ADDITIONAL TESTS:    Imaging Personally Reviewed:  [ ] YES  [ ] NO    REVIEW OF SYSTEMS:  CONSTITUTIONAL: No fever, weight loss, or fatigue  EYES: No eye pain, visual disturbances, or discharge  ENMT:  No difficulty hearing, tinnitus, vertigo; No sinus or throat pain  NECK: No pain or stiffness  BREASTS: No pain, masses, or nipple discharge  RESPIRATORY: No cough, wheezing, chills or hemoptysis; No shortness of breath  CARDIOVASCULAR: No chest pain, palpitations, dizziness, or leg swelling  GASTROINTESTINAL: No abdominal or epigastric pain. No nausea, vomiting, or hematemesis; No diarrhea or constipation. No melena or hematochezia.  GENITOURINARY: No dysuria, frequency, hematuria, or incontinence  NEUROLOGICAL: No headaches, memory loss, loss of strength, numbness, or tremors  SKIN: No itching, burning, rashes, or lesions   LYMPH NODES: No enlarged glands  ENDOCRINE: No heat or cold intolerance; No hair loss  MUSCULOSKELETAL: No joint pain or swelling; No muscle, back, or extremity pain  PSYCHIATRIC: No depression, anxiety, mood swings, or difficulty sleeping  HEME/LYMPH: No easy bruising, or bleeding gums  ALLERY AND IMMUNOLOGIC: No hives or eczema      Consultant(s) Notes Reviewed:  [ x ] YES  [ ] NO    PHYSICAL EXAM:  GENERAL: NAD, well-groomed, well-developed  HEAD:  Atraumatic, Normocephalic  EYES: EOMI, PERRLA, conjunctiva and sclera clear  ENMT: No tonsillar erythema, exudates, or enlargement; Moist mucous membranes, Good dentition, No lesions  NECK: Supple, No JVD, Normal thyroid  NERVOUS SYSTEM:  Alert & Oriented X3, Good concentration; Motor Strength 5/5 B/L upper and lower extremities; DTRs 2+ intact and symmetric  CHEST/LUNG: Clear to percussion bilaterally; No rales, rhonchi, wheezing, or rubs  HEART: Regular rate and rhythm; No murmurs, rubs, or gallops  ABDOMEN: Soft, Nontender, Nondistended; Bowel sounds present  EXTREMITIES:  2+ Peripheral Pulses, No clubbing, cyanosis, or edema  LYMPH: No lymphadenopathy noted  SKIN: No rashes or lesions    Care Discussed with Consultants/Other Providers [ x] YES  [ ] NO

## 2020-12-07 NOTE — PROGRESS NOTE ADULT - ASSESSMENT
66 yo Female with Hx of recurrent SBO, s/p SB resection in 2015, s/p lysis of adhesions in 2018, Hx of DVT, PE (was on Xarelto), IVC filter was admitted with sepsis, found to have Bacteroides fragilis bacteremia, transferred to ICU b/o hypotension, hypothermia, was on pressors, intubated, developed HCAP/aspirational PNA, sputum growing MRSA and Stenotrophomonas, MOF with EF 10-15%, encephalopathy, worsening liver tests; successfully treated with antibiotics / HF mgmt., now extubated with improved mental status, and improved liver tests, but with infected sacral decubiti, being treated for osteomyelitis, on daptomycin and fluconazole.      - Possibly the combination of HF, sepsis, significantly improved, but still with hyperbilirubinemia (1.4), AST 58, ALT 42, , without encephalopathy, cannot rule out indeed underlying vascular condition or other etiology (so far KATHY neg, consider sending out rest of CLD, including AI workup), however still ongoing acute issues and antibiotics/antifungal can contribute  - Repeat US w Doppler in ICU was not successful now with patient improvement, can attempt again to visualize HV  - Prior admission patient was referred to [facility] for workup for possible HV thrombosis, patient can follow there upon discharge as previously planned, but now still being treated for decubiti / OM  - Had EF 10-15%, repeat echo shows improvement 55-60%.    Will continue to follow  D/w primary team  Thank you for consult

## 2020-12-07 NOTE — PROGRESS NOTE ADULT - NUTRITIONAL ASSESSMENT
This patient has been assessed with a concern for Malnutrition and has been determined to have a diagnosis/diagnoses of Severe protein-calorie malnutrition.    This patient is being managed with:   Diet Dysphagia 1 Pureed-Thin Liquids-  Low Sodium  Supplement Feeding Modality:  Oral  Two Papi HN Cans or Servings Per Day:  2       Frequency:  Two Times a day  Ensure Pudding Cans or Servings Per Day:  2       Frequency:  Two Times a day  Entered: Dec  2 2020 11:57AM    DW DAUGHTER ABOUT END OF LIFE CARE

## 2020-12-07 NOTE — PROGRESS NOTE ADULT - SUBJECTIVE AND OBJECTIVE BOX
Patient was seen and examined at bedside. Full note to follow. Chief Complaint:  Patient is a 65y old  Female who presents with a chief complaint of Hypotension (07 Dec 2020 17:28)      Reason for consult: r/o Budd Chiari, abnormal Liver enzymes    Interval Events: Patient seen and examined at bedside. Continued on OM treatment.     Hospital Medications:  acetaminophen   Tablet .. 650 milliGRAM(s) Oral every 6 hours PRN  ALBUTerol    90 MICROgram(s) HFA Inhaler 2 Puff(s) Inhalation every 6 hours PRN  ascorbic acid 500 milliGRAM(s) Oral two times a day  chlorhexidine 2% Cloths 1 Application(s) Topical daily  DAPTOmycin IVPB 350 milliGRAM(s) IV Intermittent every 24 hours  dextrose 5%. 1000 milliLiter(s) IV Continuous <Continuous>  enoxaparin Injectable 50 milliGRAM(s) SubCutaneous daily  fluconAZOLE IVPB 200 milliGRAM(s) IV Intermittent every 24 hours  fluconAZOLE IVPB      furosemide    Tablet 20 milliGRAM(s) Oral daily  lactobacillus acidophilus 1 Tablet(s) Oral daily  multivitamin/minerals 1 Tablet(s) Oral daily  nystatin Powder 1 Application(s) Topical two times a day  pantoprazole   Suspension 40 milliGRAM(s) Enteral Tube daily  rifAXIMin 550 milliGRAM(s) Oral two times a day  sodium chloride 0.9% lock flush 10 milliLiter(s) IV Push every 1 hour PRN  zinc sulfate 220 milliGRAM(s) Oral daily      ROS:   General:  No  fevers, chills, night sweats, fatigue  CV:  No pain, palpitations  Pulm:  No dyspnea, cough  GI:  No abdominal pain, N/V, D/C  :  No dysuria  Muscle:  Generalized weakness  Neuro:  AAOx3      PHYSICAL EXAM:   Vital Signs:  Vital Signs Last 24 Hrs  T(C): 36.7 (07 Dec 2020 14:24), Max: 36.9 (07 Dec 2020 04:55)  T(F): 98 (07 Dec 2020 14:24), Max: 98.4 (07 Dec 2020 04:55)  HR: 96 (07 Dec 2020 14:24) (96 - 114)  BP: 119/70 (07 Dec 2020 14:24) (104/59 - 119/70)  BP(mean): --  RR: 18 (07 Dec 2020 14:24) (18 - 19)  SpO2: 100% (07 Dec 2020 14:24) (100% - 100%)  Daily     Daily     GENERAL: no acute distress  NEURO: awake, alert, oriented x3, no asterixis  HEENT: anicteric sclera, no conjunctival pallor appreciated  CHEST: no respiratory distress, no accessory muscle use  CARDIAC: regular rate, rhythm  ABDOMEN: soft, non-tender, non-distended, no rebound or guarding, BS+, no large ascites  EXTREMITIES: warm, well perfused, no edema      LABS: reviewed                        8.2    7.09  )-----------( 280      ( 07 Dec 2020 07:22 )             27.6     12-07    147<H>  |  110<H>  |  12  ----------------------------<  84  4.0   |  32<H>  |  0.64    Ca    8.4      07 Dec 2020 07:22  Phos  2.8     12-07  Mg     1.8     12-07    TPro  6.3  /  Alb  1.7<L>  /  TBili  1.4<H>  /  DBili  x   /  AST  58<H>  /  ALT  42  /  AlkPhos  279<H>  12-07    LIVER FUNCTIONS - ( 07 Dec 2020 07:22 )  Alb: 1.7 g/dL / Pro: 6.3 g/dL / ALK PHOS: 279 U/L / ALT: 42 U/L DA / AST: 58 U/L / GGT: x             Interval Diagnostic Studies: see sunrise for full report

## 2020-12-07 NOTE — PROGRESS NOTE ADULT - ASSESSMENT
Patient is a 65y old  Female from home, lives with daughter, ambulates with walker with PMH of recurrent SBO's, s/p exp lap, SB resection in 2015, ex lap, ALBINA in 2018, DVT, PE, on Xarelto, IVC filter, chronic leg swelling, and anasarca, Now send in to the ER by her PCP, Dr. Ross, for evaluation of  generalized weakness and hypotension BP of 80/40 during office visit. On admission, she found to have no fever and BP was in lower normal range and no Leukocytosis. The CXR is clear and CT abd/pelvis consistent with Ileus. The ID consult requested to assist with evaluation of infectious etiology of episode of hypotension. Found to have Bacteroides bacteremia and now developed septic shock on Cefepime and Flagyl. Hence transferred to ICU. 10/20/20.    # Hypotension  at office- BP of 80/40 - resolved   #  Bacteroides fragilis Bacteremia - 10/13/20 - ? source most likely GI- Repeat Blood Cxs have NGTD 10/16/20  # Ileus  - h/o recurrent SBO and s/p EXp. LAp  # COVID 19 negative   # Septic shock ( Hypothermia + hypotensive)- transferred to ICU since requiring pressor- source GI, The CT abd/pelvis shows nonspecific enterocolitis, noninfectious inflammatory bowel disease, or ischemic bowel, along with ileus.   # Pneumonia- HCAP vs Aspiration - on CXR 10/24 and CT chest 10/21- sputum Cx grew Methicillin resistant Staphylococcus aureus  and Stenotrophomonas maltophilia.  # S/p extubated 11/9/20  # Infected sacral ulcer- s/p debridement and culture grew Enterococcus, VRE and Candida, sensitivity is pending- The MRI of pelvis shows  Osteomyelitis of the lower sacrum and coccyx with overlying cutaneous ulceration.   # Fever- 11/8 and 11/19- BCX NGTD 11/19 - The CXR shows Improvement in bilateral airspace disease with persistent bibasilar consolidation. She is s/p removal of velazquez catheter and passed TOV.  # HCAP- 11/27/20,  Intermittent fever and CXR shows  bilateral consolidations right greater than left. - The procalcitonin level is 1.55  # s/p PICC line placement 11/30      would recommend:    1. OOB to chair   2. Continue Daptomycin to cover VRE  and  Fluconazole to cover Candida in sacral wound culture X 6  weeks to treat Osteomyelitis,  might change to Linezolid for last two weeks of the course, end date 12/29/20  3. Aspiration precaution  4. Frequent repositioning       Attending Attestation:    Spent more than 35 minutes on total encounter, more than 50 % of the visit was spent counseling and/or coordinating care by the Attending physician. Patient is a 65y old  Female from home, lives with daughter, ambulates with walker with PMH of recurrent SBO's, s/p exp lap, SB resection in 2015, ex lap, ALBINA in 2018, DVT, PE, on Xarelto, IVC filter, chronic leg swelling, and anasarca, Now send in to the ER by her PCP, Dr. Ross, for evaluation of  generalized weakness and hypotension BP of 80/40 during office visit. On admission, she found to have no fever and BP was in lower normal range and no Leukocytosis. The CXR is clear and CT abd/pelvis consistent with Ileus. The ID consult requested to assist with evaluation of infectious etiology of episode of hypotension. Found to have Bacteroides bacteremia and now developed septic shock on Cefepime and Flagyl. Hence transferred to ICU. 10/20/20.    # Hypotension  at office- BP of 80/40 - resolved   #  Bacteroides fragilis Bacteremia - 10/13/20 - ? source most likely GI- Repeat Blood Cxs have NGTD 10/16/20  # Ileus  - h/o recurrent SBO and s/p EXp. LAp  # COVID 19 negative   # Septic shock ( Hypothermia + hypotensive)- transferred to ICU since requiring pressor- source GI, The CT abd/pelvis shows nonspecific enterocolitis, noninfectious inflammatory bowel disease, or ischemic bowel, along with ileus.   # Pneumonia- HCAP vs Aspiration - on CXR 10/24 and CT chest 10/21- sputum Cx grew Methicillin resistant Staphylococcus aureus  and Stenotrophomonas maltophilia.  # S/p extubated 11/9/20  # Infected sacral ulcer- s/p debridement and culture grew Enterococcus, VRE and Candida, sensitivity is pending- The MRI of pelvis shows  Osteomyelitis of the lower sacrum and coccyx with overlying cutaneous ulceration.   # Fever- 11/8 and 11/19- BCX NGTD 11/19 - The CXR shows Improvement in bilateral airspace disease with persistent bibasilar consolidation. She is s/p removal of velazquez catheter and passed TOV.  # HCAP- 11/27/20,  Intermittent fever and CXR shows  bilateral consolidations right greater than left. - The procalcitonin level is 1.55  # s/p PICC line placement 11/30      would recommend:    1. Continue Daptomycin to cover VRE  and  Fluconazole to cover Candida in sacral wound culture X 6  weeks to treat Osteomyelitis,  might change to oral Linezolid  and oral fluconazole on discharge to continue until  12/29/20  2. Aspiration precaution  3. Frequent repositioning   4. OOB to chair     d/w DR. Burch and Dr. Ross    Attending Attestation:    Spent more than 45 minutes on total encounter, more than 50 % of the visit was spent counseling and/or coordinating care by the Attending physician.

## 2020-12-07 NOTE — CHART NOTE - NSCHARTNOTEFT_GEN_A_CORE
Assessment:   65yFemalePatient is a 65y old  Female who presents with a chief complaint of Hypotension (07 Dec 2020 10:50). PT visited. PT Reports eating fair. Pt is fed by staff.  Pt is on  Dysphagia Pureed thin liquids with Two papi HN bid and Ensure pudding X2. Labs Noted. Meds Noted       Factors impacting intake: [ ] none [ ] nausea  [ ] vomiting [ ] diarrhea [ ] constipation  [ ]chewing problems [ ] swallowing issues  [ ] other:     Diet Prescription: Diet, Dysphagia 1 Pureed-Thin Liquids:   Low Sodium  Supplement Feeding Modality:  Oral  Two Papi HN Cans or Servings Per Day:  2       Frequency:  Two Times a day  Ensure Pudding Cans or Servings Per Day:  2       Frequency:  Two Times a day (12-02-20 @ 11:57)    Intake: ~< 50 %     Current Weight:   % Weight Change Bed  scale  112 lb with out scd device ???     Pertinent Medications: MEDICATIONS  (STANDING):  ascorbic acid 500 milliGRAM(s) Oral two times a day  chlorhexidine 2% Cloths 1 Application(s) Topical daily  DAPTOmycin IVPB 350 milliGRAM(s) IV Intermittent every 24 hours  dextrose 5%. 1000 milliLiter(s) (40 mL/Hr) IV Continuous <Continuous>  enoxaparin Injectable 50 milliGRAM(s) SubCutaneous daily  fluconAZOLE IVPB 200 milliGRAM(s) IV Intermittent every 24 hours  fluconAZOLE IVPB      furosemide    Tablet 20 milliGRAM(s) Oral daily  lactobacillus acidophilus 1 Tablet(s) Oral daily  multivitamin/minerals 1 Tablet(s) Oral daily  nystatin Powder 1 Application(s) Topical two times a day  pantoprazole   Suspension 40 milliGRAM(s) Enteral Tube daily  rifAXIMin 550 milliGRAM(s) Oral two times a day  zinc sulfate 220 milliGRAM(s) Oral daily    MEDICATIONS  (PRN):  acetaminophen   Tablet .. 650 milliGRAM(s) Oral every 6 hours PRN Temp greater or equal to 38C (100.4F), Mild Pain (1 - 3), Moderate Pain (4 - 6)  ALBUTerol    90 MICROgram(s) HFA Inhaler 2 Puff(s) Inhalation every 6 hours PRN Shortness of Breath and/or Wheezing  sodium chloride 0.9% lock flush 10 milliLiter(s) IV Push every 1 hour PRN Pre/post blood products, medications, blood draw, and to maintain line patency    Pertinent Labs: 12-07 Na147 mmol/L<H> Glu 84 mg/dL K+ 4.0 mmol/L Cr  0.64 mg/dL BUN 12 mg/dL 12-07 Phos 2.8 mg/dL 12-07 Alb 1.7 g/dL<L>     CAPILLARY BLOOD GLUCOSE      POCT Blood Glucose.: 120 mg/dL (07 Dec 2020 12:06)  POCT Blood Glucose.: 111 mg/dL (07 Dec 2020 06:20)  POCT Blood Glucose.: 121 mg/dL (06 Dec 2020 23:52)  POCT Blood Glucose.: 120 mg/dL (06 Dec 2020 21:46)  POCT Blood Glucose.: 130 mg/dL (06 Dec 2020 17:27)    Skin: DTI @ coccyx.    Estimated Needs:   [ ] no change since previous assessment  [ ] recalculated:     Previous Nutrition Diagnosis:   [ ] Inadequate Energy Intake [ ]Inadequate Oral Intake [ ] Excessive Energy Intake   [ ] Underweight [ ] Increased Nutrient Needs [ ] Overweight/Obesity   [ ] Altered GI Function [ ] Unintended Weight Loss [ ] Food & Nutrition Related Knowledge Deficit [x ] Malnutrition severe    Nutrition Diagnosis is [x ] ongoing  [ ] resolved [ ] not applicable     New Nutrition Diagnosis: [ ] not applicable       Interventions:   Recommend  [ ] Change Diet To:  [x ] Nutrition Supplement Continue with TWO Papi HN BID and Ensure Pudding BID  [ ] Nutrition Support  [ ] Other:     Monitoring and Evaluation:   [ ] PO intake [ x ] Tolerance to diet prescription [ x ] weights [ x ] labs[ x ] follow up per protocol  [ ] other:

## 2020-12-07 NOTE — PROGRESS NOTE ADULT - PROBLEM SELECTOR PLAN 3
Probably secondary to portal hypertension- LFTs unchanged and abd exam benign   Continue rifaximin  Hepatology following.

## 2020-12-07 NOTE — PROGRESS NOTE ADULT - SUBJECTIVE AND OBJECTIVE BOX
Patient is seen and examined at the bed side, remains afebrile.  No new events.  The WBC count and creatinine stay normal.      REVIEW OF SYSTEMS: All other review systems are negative      ALLERGIES: No Known Allergies      Vital Signs Last 24 Hrs  T(C): 36.7 (07 Dec 2020 14:24), Max: 36.9 (07 Dec 2020 04:55)  T(F): 98 (07 Dec 2020 14:24), Max: 98.4 (07 Dec 2020 04:55)  HR: 96 (07 Dec 2020 14:24) (96 - 114)  BP: 119/70 (07 Dec 2020 14:24) (104/59 - 119/70)  BP(mean): --  RR: 18 (07 Dec 2020 14:24) (18 - 19)  SpO2: 100% (07 Dec 2020 14:24) (100% - 100%)      PHYSICAL EXAM:  GENERAL: Not in distress, on oxygen via NC  CHEST/LUNG: Not using accessory muscles   HEART: s1 and s2 present  ABDOMEN:  Nontender and  Nondistended  EXTREMITIES: B/L UE edematous improved  CNS: Awake and Alert         LABS:                        8.2    7.09  )-----------( 280      ( 07 Dec 2020 07:22 )             27.6                  9.9    9.41  )-----------( 269      ( 24 Nov 2020 06:38 )             31.5                           9.4    13.14 )-----------( 149      ( 14 Nov 2020 07:58 )             28.7       12-07    147<H>  |  110<H>  |  12  ----------------------------<  84  4.0   |  32<H>  |  0.64    Ca    8.4      07 Dec 2020 07:22  Phos  2.8     12-07  Mg     1.8     12-07    TPro  6.3  /  Alb  1.7<L>  /  TBili  1.4<H>  /  DBili  x   /  AST  58<H>  /  ALT  42  /  AlkPhos  279<H>  12-07    12-06    146<H>  |  109<H>  |  13  ----------------------------<  83  3.7   |  34<H>  |  0.64    Ca    8.7      06 Dec 2020 06:13  Phos  2.9     12-06  Mg     1.8     12-06    TPro  6.6  /  Alb  1.9<L>  /  TBili  1.5<H>  /  DBili  x   /  AST  57<H>  /  ALT  41  /  AlkPhos  265<H>  12-06    11-06    138  |  104  |  37<H>  ----------------------------<  81  3.9   |  25  |  2.37<H>    Ca    8.1<L>      06 Nov 2020 06:20  Phos  3.4     11-06  Mg     2.0     11-06    TPro  5.1<L>  /  Alb  2.8<L>  /  TBili  9.2<H>  /  DBili  x   /  AST  233<H>  /  ALT  99<H>  /  AlkPhos  96  11-06      Vancomycin Level, Trough (11.07.20 @ 21:49)   Vancomycin Level, Trough: 16.1:     Vancomycin Level, Trough (11.07.20 @ 07:08) : 19.5  Vancomycin Level, Trough (11.06.20 @ 06:20) : 19.9:   Procalcitonin, Serum (11.28.20 @ 15:38) : 1.55:       MEDICATIONS  (STANDING):    ascorbic acid 500 milliGRAM(s) Oral two times a day  chlorhexidine 2% Cloths 1 Application(s) Topical daily  DAPTOmycin IVPB 350 milliGRAM(s) IV Intermittent every 24 hours  dextrose 5%. 1000 milliLiter(s) (40 mL/Hr) IV Continuous <Continuous>  enoxaparin Injectable 50 milliGRAM(s) SubCutaneous daily  fluconAZOLE IVPB 200 milliGRAM(s) IV Intermittent every 24 hours  fluconAZOLE IVPB      furosemide    Tablet 20 milliGRAM(s) Oral daily  lactobacillus acidophilus 1 Tablet(s) Oral daily  multivitamin/minerals 1 Tablet(s) Oral daily  nystatin Powder 1 Application(s) Topical two times a day  pantoprazole   Suspension 40 milliGRAM(s) Enteral Tube daily  rifAXIMin 550 milliGRAM(s) Oral two times a day  zinc sulfate 220 milliGRAM(s) Oral daily      RADIOLOGY & ADDITIONAL TESTS:    11/27/20 : Xray Chest 1 View- PORTABLE-Urgent (Xray Chest 1 View- PORTABLE-Urgent .) (11.27.20 @ 06:53) Increased interstitial lung markings with bilateral consolidations right greater than left. Small right pleural effusion. Overall worsening compared to prior exam.      11/24/20 : MR Pelvis Bony Only No Cont (11.24.20 @ 18:02) : Osteomyelitis of the lower sacrum and coccyx with overlying cutaneous ulceration,      11/19/20 : Xray Chest 1 View- PORTABLE-Urgent (Xray Chest 1 View- PORTABLE-Urgent .) (11.19.20 @ 23:53): Improvement in bilateral airspace disease with persistent bibasilar consolidation and small effusions.    11/4/20 : Xray Chest 1 View- PORTABLE-Routine (Xray Chest 1 View- PORTABLE-Routine in AM.) (11.04.20 @ 10:59) Reexpansion of the left lung with residual left pleural effusion and/or infiltrate.  Right pulmonary edema unchanged.  Tubes and catheters in satisfactory position.      10/25/20: Xray Chest 1 View- PORTABLE-Routine (Xray Chest 1 View- PORTABLE-Routine in AM.) (10.25.20 @ 09:21) Frontal expiratory view of the chest shows the heart to be similar in size. Endotracheal tube, right jugular line and feeding tube remain present. The lungs show similar left upper lobe infiltrate with progression of right perihilar infiltrate. Pleural effusions are similar. There is no evidence of pneumothorax.    10/21/20 : CT Abdomen and Pelvis w/ Oral Cont and w/ IV Cont (10.21.20 @ 15:59) Mural thickening of the left colon and rectum. Liquid stool in the colon. Apparent segmental mural thickening of the mid to distal small bowel and the proximal duodenum. Findings may represent nonspecific enterocolitis, noninfectious inflammatory bowel disease, or ischemic bowel. Clinical correlation is recommended. The celiac axis artery, SMA, and KINA are patent without stenosis.    Dilatation of the mid small bowel is again noted. Oral contrast has reached the terminal ileum. Findings may represent small bowel obstruction, or ileus related to nonspecific enterocolitis.   Cholelithiasis. Moderate to large ascites in the abdomen, increased since the previous examination. 5 mm nonspecific noncalcified left upper lobe lung nodule; if the patient's is in the high risk category (i.e. smoker), follow-up chest CT may be pursued in 12 months to ensure stability. Combination of atelectasis and consolidation in the left lower lobe.  Possible 1.0 cm hypodense lesion in the left lobe of the thyroid. Thyroid ultrasound may be pursued for further evaluation.   Mild bilateral pleural effusions.  Aging determinate compression fracture at T5 vertebra.        MICROBIOLOGY DATA:    MRSA/MSSA PCR (11.28.20 @ 11:34)   MRSA PCR Result.: Detected:     Urine Microscopic-Add On (NC) (11.20.20 @ 04:12)   Red Blood Cell - Urine: 5-10 /HPF   White Blood Cell - Urine: 11-25 /HPF   Calcium Oxalate Crystals: Few /HPF   Bacteria: Moderate /HPF   Comment - Urine: few amorphous urates   Epithelial Cells: Few /HPF     Culture - Blood (11.19.20 @ 10:19)   Specimen Source: .Blood Blood-Peripheral   Culture Results: No growth to date.     Culture - Surgical Swab (11.12.20 @ 05:01)   - Ampicillin: R >8 Predicts results to ampicillin/sulbactam, amoxacillin-clavulanate and piperacillin-tazobactam.   - Levofloxacin: R >4   - Linezolid: S 1   - Tetra/Doxy: R >8   - Vancomycin: R >16   Specimen Source: .Surgical Swab Sacral decub   Culture Results:   Few Enterococcus faecium (vancomycin resistant)   Rare Candida albicans "Susceptibilities not performed"   Organism Identification: Enterococcus faecium (vancomycin resistant)   Organism: Enterococcus faecium (vancomycin resistant)   Method Type: STEWART     Culture - Surgical Swab (11.12.20 @ 05:01)   Specimen Source: .Surgical Swab Sacral decub   Culture Results:  Few Enterococcus faecium Susceptibility to follow.   Rare Candida albicans "Susceptibilities not performed"     Culture - Sputum . (10.26.20 @ 00:26)   - Ampicillin/Sulbactam: R <=8/4   - Cefazolin: R >16   - Ceftazidime: I 16   - Clindamycin: R >4   - Erythromycin: R >4   - Gentamicin: S <=1 Should not be used as monotherapy   - Levofloxacin: S <=0.5   - Linezolid: S 2   - Oxacillin: R >2   - Penicillin: R >8   - RIF- Rifampin: S <=1 Should not be used as monotherapy   - Tetra/Doxy: S <=1   - Trimethoprim/Sulfamethoxazole: S <=0.5/9.5   - Trimethoprim/Sulfamethoxazole: S <=0.5/9.5   - Vancomycin: S 1     MRSA/MSSA PCR (10.21.20 @ 09:41)   MRSA PCR Result.: Detected:       Culture - Blood (10.20.20 @ 22:09)   Specimen Source: .Blood Blood   Culture Results:  No growth to date.     Culture - Blood (10.20.20 @ 22:09)   Specimen Source: .Blood Blood   Culture Results:   No growth to date.     Culture - Blood in AM (10.16.20 @ 10:13)   Specimen Source: .Blood Blood-Peripheral   Culture Results: No growth to date.     Culture - Blood in AM (10.16.20 @ 10:13)   Specimen Source: .Blood Blood-Peripheral   Culture Results: No growth to date.     Culture - Blood (10.13.20 @ 22:23)   Growth in anaerobic bottle: Gram Negative Rods   Specimen Source: .Blood Blood-Peripheral   Organism: Blood Culture PCR   Culture Results: Growth in anaerobic bottle: Bacteroides fragilis   "Susceptibilities not performed"     Culture - Blood (10.13.20 @ 22:23)   Specimen Source: .Blood Blood-Peripheral   Culture Results:   No growth to date.            Patient is seen and examined at the bed side, remains afebrile.  The WBC count and creatinine stay normal.      REVIEW OF SYSTEMS: All other review systems are negative      ALLERGIES: No Known Allergies      Vital Signs Last 24 Hrs  T(C): 36.7 (07 Dec 2020 14:24), Max: 36.9 (07 Dec 2020 04:55)  T(F): 98 (07 Dec 2020 14:24), Max: 98.4 (07 Dec 2020 04:55)  HR: 96 (07 Dec 2020 14:24) (96 - 114)  BP: 119/70 (07 Dec 2020 14:24) (104/59 - 119/70)  BP(mean): --  RR: 18 (07 Dec 2020 14:24) (18 - 19)  SpO2: 100% (07 Dec 2020 14:24) (100% - 100%)      PHYSICAL EXAM:  GENERAL: Not in distress, on oxygen via NC  CHEST/LUNG: Not using accessory muscles   HEART: s1 and s2 present  ABDOMEN:  Nontender and  Nondistended  EXTREMITIES: B/L UE edematous improved  CNS: Awake and Alert         LABS:                        8.2    7.09  )-----------( 280      ( 07 Dec 2020 07:22 )             27.6                  9.9    9.41  )-----------( 269      ( 24 Nov 2020 06:38 )             31.5                           9.4    13.14 )-----------( 149      ( 14 Nov 2020 07:58 )             28.7       12-07    147<H>  |  110<H>  |  12  ----------------------------<  84  4.0   |  32<H>  |  0.64    Ca    8.4      07 Dec 2020 07:22  Phos  2.8     12-07  Mg     1.8     12-07    TPro  6.3  /  Alb  1.7<L>  /  TBili  1.4<H>  /  DBili  x   /  AST  58<H>  /  ALT  42  /  AlkPhos  279<H>  12-07    12-06    146<H>  |  109<H>  |  13  ----------------------------<  83  3.7   |  34<H>  |  0.64    Ca    8.7      06 Dec 2020 06:13  Phos  2.9     12-06  Mg     1.8     12-06    TPro  6.6  /  Alb  1.9<L>  /  TBili  1.5<H>  /  DBili  x   /  AST  57<H>  /  ALT  41  /  AlkPhos  265<H>  12-06    11-06    138  |  104  |  37<H>  ----------------------------<  81  3.9   |  25  |  2.37<H>    Ca    8.1<L>      06 Nov 2020 06:20  Phos  3.4     11-06  Mg     2.0     11-06    TPro  5.1<L>  /  Alb  2.8<L>  /  TBili  9.2<H>  /  DBili  x   /  AST  233<H>  /  ALT  99<H>  /  AlkPhos  96  11-06      Vancomycin Level, Trough (11.07.20 @ 21:49)   Vancomycin Level, Trough: 16.1:     Vancomycin Level, Trough (11.07.20 @ 07:08) : 19.5  Vancomycin Level, Trough (11.06.20 @ 06:20) : 19.9:   Procalcitonin, Serum (11.28.20 @ 15:38) : 1.55:       MEDICATIONS  (STANDING):    ascorbic acid 500 milliGRAM(s) Oral two times a day  chlorhexidine 2% Cloths 1 Application(s) Topical daily  DAPTOmycin IVPB 350 milliGRAM(s) IV Intermittent every 24 hours  dextrose 5%. 1000 milliLiter(s) (40 mL/Hr) IV Continuous <Continuous>  enoxaparin Injectable 50 milliGRAM(s) SubCutaneous daily  fluconAZOLE IVPB 200 milliGRAM(s) IV Intermittent every 24 hours  fluconAZOLE IVPB      furosemide    Tablet 20 milliGRAM(s) Oral daily  lactobacillus acidophilus 1 Tablet(s) Oral daily  multivitamin/minerals 1 Tablet(s) Oral daily  nystatin Powder 1 Application(s) Topical two times a day  pantoprazole   Suspension 40 milliGRAM(s) Enteral Tube daily  rifAXIMin 550 milliGRAM(s) Oral two times a day  zinc sulfate 220 milliGRAM(s) Oral daily      RADIOLOGY & ADDITIONAL TESTS:    11/27/20 : Xray Chest 1 View- PORTABLE-Urgent (Xray Chest 1 View- PORTABLE-Urgent .) (11.27.20 @ 06:53) Increased interstitial lung markings with bilateral consolidations right greater than left. Small right pleural effusion. Overall worsening compared to prior exam.      11/24/20 : MR Pelvis Bony Only No Cont (11.24.20 @ 18:02) : Osteomyelitis of the lower sacrum and coccyx with overlying cutaneous ulceration,      11/19/20 : Xray Chest 1 View- PORTABLE-Urgent (Xray Chest 1 View- PORTABLE-Urgent .) (11.19.20 @ 23:53): Improvement in bilateral airspace disease with persistent bibasilar consolidation and small effusions.    11/4/20 : Xray Chest 1 View- PORTABLE-Routine (Xray Chest 1 View- PORTABLE-Routine in AM.) (11.04.20 @ 10:59) Reexpansion of the left lung with residual left pleural effusion and/or infiltrate.  Right pulmonary edema unchanged.  Tubes and catheters in satisfactory position.      10/25/20: Xray Chest 1 View- PORTABLE-Routine (Xray Chest 1 View- PORTABLE-Routine in AM.) (10.25.20 @ 09:21) Frontal expiratory view of the chest shows the heart to be similar in size. Endotracheal tube, right jugular line and feeding tube remain present. The lungs show similar left upper lobe infiltrate with progression of right perihilar infiltrate. Pleural effusions are similar. There is no evidence of pneumothorax.    10/21/20 : CT Abdomen and Pelvis w/ Oral Cont and w/ IV Cont (10.21.20 @ 15:59) Mural thickening of the left colon and rectum. Liquid stool in the colon. Apparent segmental mural thickening of the mid to distal small bowel and the proximal duodenum. Findings may represent nonspecific enterocolitis, noninfectious inflammatory bowel disease, or ischemic bowel. Clinical correlation is recommended. The celiac axis artery, SMA, and KINA are patent without stenosis.    Dilatation of the mid small bowel is again noted. Oral contrast has reached the terminal ileum. Findings may represent small bowel obstruction, or ileus related to nonspecific enterocolitis.   Cholelithiasis. Moderate to large ascites in the abdomen, increased since the previous examination. 5 mm nonspecific noncalcified left upper lobe lung nodule; if the patient's is in the high risk category (i.e. smoker), follow-up chest CT may be pursued in 12 months to ensure stability. Combination of atelectasis and consolidation in the left lower lobe.  Possible 1.0 cm hypodense lesion in the left lobe of the thyroid. Thyroid ultrasound may be pursued for further evaluation.   Mild bilateral pleural effusions.  Aging determinate compression fracture at T5 vertebra.        MICROBIOLOGY DATA:    MRSA/MSSA PCR (11.28.20 @ 11:34)   MRSA PCR Result.: Detected:     Urine Microscopic-Add On (NC) (11.20.20 @ 04:12)   Red Blood Cell - Urine: 5-10 /HPF   White Blood Cell - Urine: 11-25 /HPF   Calcium Oxalate Crystals: Few /HPF   Bacteria: Moderate /HPF   Comment - Urine: few amorphous urates   Epithelial Cells: Few /HPF     Culture - Blood (11.19.20 @ 10:19)   Specimen Source: .Blood Blood-Peripheral   Culture Results: No growth to date.     Culture - Surgical Swab (11.12.20 @ 05:01)   - Ampicillin: R >8 Predicts results to ampicillin/sulbactam, amoxacillin-clavulanate and piperacillin-tazobactam.   - Levofloxacin: R >4   - Linezolid: S 1   - Tetra/Doxy: R >8   - Vancomycin: R >16   Specimen Source: .Surgical Swab Sacral decub   Culture Results:   Few Enterococcus faecium (vancomycin resistant)   Rare Candida albicans "Susceptibilities not performed"   Organism Identification: Enterococcus faecium (vancomycin resistant)   Organism: Enterococcus faecium (vancomycin resistant)   Method Type: STEWART     Culture - Surgical Swab (11.12.20 @ 05:01)   Specimen Source: .Surgical Swab Sacral decub   Culture Results:  Few Enterococcus faecium Susceptibility to follow.   Rare Candida albicans "Susceptibilities not performed"     Culture - Sputum . (10.26.20 @ 00:26)   - Ampicillin/Sulbactam: R <=8/4   - Cefazolin: R >16   - Ceftazidime: I 16   - Clindamycin: R >4   - Erythromycin: R >4   - Gentamicin: S <=1 Should not be used as monotherapy   - Levofloxacin: S <=0.5   - Linezolid: S 2   - Oxacillin: R >2   - Penicillin: R >8   - RIF- Rifampin: S <=1 Should not be used as monotherapy   - Tetra/Doxy: S <=1   - Trimethoprim/Sulfamethoxazole: S <=0.5/9.5   - Trimethoprim/Sulfamethoxazole: S <=0.5/9.5   - Vancomycin: S 1     MRSA/MSSA PCR (10.21.20 @ 09:41)   MRSA PCR Result.: Detected:       Culture - Blood (10.20.20 @ 22:09)   Specimen Source: .Blood Blood   Culture Results:  No growth to date.     Culture - Blood (10.20.20 @ 22:09)   Specimen Source: .Blood Blood   Culture Results:   No growth to date.     Culture - Blood in AM (10.16.20 @ 10:13)   Specimen Source: .Blood Blood-Peripheral   Culture Results: No growth to date.     Culture - Blood in AM (10.16.20 @ 10:13)   Specimen Source: .Blood Blood-Peripheral   Culture Results: No growth to date.     Culture - Blood (10.13.20 @ 22:23)   Growth in anaerobic bottle: Gram Negative Rods   Specimen Source: .Blood Blood-Peripheral   Organism: Blood Culture PCR   Culture Results: Growth in anaerobic bottle: Bacteroides fragilis   "Susceptibilities not performed"     Culture - Blood (10.13.20 @ 22:23)   Specimen Source: .Blood Blood-Peripheral   Culture Results:   No growth to date.

## 2020-12-07 NOTE — PROGRESS NOTE ADULT - PROBLEM SELECTOR PLAN 10
DVT and GI prophylaxis  Continue rifaximin  Dapto for total of 6 weeks with end date 12/28/2020 (11/17 - 12/28/2020- weekly CK while on Dapto.

## 2020-12-07 NOTE — PROGRESS NOTE ADULT - PROBLEM SELECTOR PROBLEM 10
Discharge planning issues
Discharge planning issues
Diarrhea
Need for prophylactic measure

## 2020-12-07 NOTE — PROGRESS NOTE ADULT - SUBJECTIVE AND OBJECTIVE BOX
MARIBETH PADILLA    SCU progress note    INTERVAL HPI/OVERNIGHT EVENTS: ***No overnight events.    DNR [x ]   DNI  [  ]   TRIAL OF INTUBATION    Covid - 19 PCR: Negative 11/30    The 4Ms    What Matters Most: see [facility]  Age appropriate Medications/Screen for High Risk Medication: Yes  Mentation: see CAM below  Mobility: defer to physical exam    The Confusion Assessment Method (CAM) Diagnostic Algorithm     1: Acute Onset or Fluctuating Course  - Is there evidence of an acute change in mental status from the patient’s baseline? Did the (abnormal) behavior  fluctuate during the day, that is, tend to come and go, or increase and decrease in severity?  [ ] YES [ ] NO     2: Inattention  - Did the patient have difficulty focusing attention, being easily distractible, or having difficulty keeping track of what was being said?  [ ] YES [ ] NO     3: Disorganized thinking  -Was the patient’s thinking disorganized or incoherent, such as rambling or irrelevant conversation, unclear or illogical flow of ideas, or unpredictable switching from subject to subject?  [ ] YES [ ] NO    4: Altered Level of consciousness?  [ ] YES [ ] NO    The diagnosis of delirium by CAM requires the presence of features 1 and 2 and either 3 or 4.    PRESSORS: [ ] YES [ ] NO  DAPTOmycin IVPB 350 milliGRAM(s) IV Intermittent every 24 hours  fluconAZOLE IVPB 200 milliGRAM(s) IV Intermittent every 24 hours  fluconAZOLE IVPB      rifAXIMin 550 milliGRAM(s) Oral two times a day    Cardiovascular:  Heart Failure  Acute   Acute on Chronic  Chronic       furosemide    Tablet 20 milliGRAM(s) Oral daily    Pulmonary:  ALBUTerol    90 MICROgram(s) HFA Inhaler 2 Puff(s) Inhalation every 6 hours PRN    Hematalogic:  enoxaparin Injectable 50 milliGRAM(s) SubCutaneous daily    Other:  acetaminophen   Tablet .. 650 milliGRAM(s) Oral every 6 hours PRN  ascorbic acid 500 milliGRAM(s) Oral two times a day  chlorhexidine 2% Cloths 1 Application(s) Topical daily  dextrose 5%. 1000 milliLiter(s) IV Continuous <Continuous>  lactobacillus acidophilus 1 Tablet(s) Oral daily  multivitamin/minerals 1 Tablet(s) Oral daily  nystatin Powder 1 Application(s) Topical two times a day  pantoprazole   Suspension 40 milliGRAM(s) Enteral Tube daily  sodium chloride 0.9% lock flush 10 milliLiter(s) IV Push every 1 hour PRN  zinc sulfate 220 milliGRAM(s) Oral daily    acetaminophen   Tablet .. 650 milliGRAM(s) Oral every 6 hours PRN  ALBUTerol    90 MICROgram(s) HFA Inhaler 2 Puff(s) Inhalation every 6 hours PRN  ascorbic acid 500 milliGRAM(s) Oral two times a day  chlorhexidine 2% Cloths 1 Application(s) Topical daily  DAPTOmycin IVPB 350 milliGRAM(s) IV Intermittent every 24 hours  dextrose 5%. 1000 milliLiter(s) IV Continuous <Continuous>  enoxaparin Injectable 50 milliGRAM(s) SubCutaneous daily  fluconAZOLE IVPB 200 milliGRAM(s) IV Intermittent every 24 hours  fluconAZOLE IVPB      furosemide    Tablet 20 milliGRAM(s) Oral daily  lactobacillus acidophilus 1 Tablet(s) Oral daily  multivitamin/minerals 1 Tablet(s) Oral daily  nystatin Powder 1 Application(s) Topical two times a day  pantoprazole   Suspension 40 milliGRAM(s) Enteral Tube daily  rifAXIMin 550 milliGRAM(s) Oral two times a day  sodium chloride 0.9% lock flush 10 milliLiter(s) IV Push every 1 hour PRN  zinc sulfate 220 milliGRAM(s) Oral daily    Drug Dosing Weight  Height (cm): 167.6 (21 Oct 2020 02:26)  Weight (kg): 56.2 (21 Oct 2020 02:26)  BMI (kg/m2): 20 (21 Oct 2020 02:26)  BSA (m2): 1.63 (21 Oct 2020 02:26)    CENTRAL LINE: [ ] YES [ ] NO  LOCATION:   DATE INSERTED:  REMOVE: [ ] YES [ ] NO  EXPLAIN:    TREJO: [ ] YES [ ] NO    DATE INSERTED:  REMOVE:  [ ] YES [ ] NO  EXPLAIN:    PAST MEDICAL & SURGICAL HISTORY:  Anasarca    Pulmonary embolism    DVT (deep venous thrombosis)    SBO (small bowel obstruction)    H/O exploratory laparotomy                        PHYSICAL EXAM:    GENERAL: NAD, well-groomed, well-developed  HEAD:  Atraumatic, Normocephalic  EYES: EOMI, PERRLA, conjunctiva and sclera clear  ENMT: No tonsillar erythema, exudates, or enlargement; Moist mucous membranes, Good dentition, No lesions  NECK: Supple, No JVD, Normal thyroid  NERVOUS SYSTEM:  Alert & Oriented X3, Good concentration; Motor Strength 5/5 B/L upper and lower extremities; DTRs 2+ intact and symmetric  CHEST/LUNG: Clear to percussion bilaterally; No rales, rhonchi, wheezing, or rubs  HEART: Regular rate and rhythm; No murmurs, rubs, or gallops  ABDOMEN: Soft, Nontender, Nondistended; Bowel sounds present  EXTREMITIES:  2+ Peripheral Pulses, No clubbing, cyanosis, or edema  LYMPH: No lymphadenopathy noted  SKIN: No rashes or lesions      LABS:  CBC Full  -  ( 07 Dec 2020 07:22 )  WBC Count : 7.09 K/uL  RBC Count : 2.83 M/uL  Hemoglobin : 8.2 g/dL  Hematocrit : 27.6 %  Platelet Count - Automated : 280 K/uL  Mean Cell Volume : 97.5 fl  Mean Cell Hemoglobin : 29.0 pg  Mean Cell Hemoglobin Concentration : 29.7 gm/dL  Auto Neutrophil # : 4.79 K/uL  Auto Lymphocyte # : 1.54 K/uL  Auto Monocyte # : 0.48 K/uL  Auto Eosinophil # : 0.21 K/uL  Auto Basophil # : 0.05 K/uL  Auto Neutrophil % : 67.5 %  Auto Lymphocyte % : 21.7 %  Auto Monocyte % : 6.8 %  Auto Eosinophil % : 3.0 %  Auto Basophil % : 0.7 %    12-07    147<H>  |  110<H>  |  12  ----------------------------<  84  4.0   |  32<H>  |  0.64    Ca    8.4      07 Dec 2020 07:22  Phos  2.8     12-07  Mg     1.8     12-07    TPro  6.3  /  Alb  1.7<L>  /  TBili  1.4<H>  /  DBili  x   /  AST  58<H>  /  ALT  42  /  AlkPhos  279<H>  12-07              [  ]  DVT Prophylaxis  [  ]  Nutrition, Brand, Rate         Goal Rate        Abnormal Nutritional Status -  Malnutrition   Cachexia      Morbid Obesity BMI >/=40    RADIOLOGY & ADDITIONAL STUDIES:  ***    Goals of Care Discussion with Family/Proxy/Other   - see note from/family meeting set up for...     MARIBETH PADILLA    SCU progress note    INTERVAL HPI/OVERNIGHT EVENTS: ***No overnight events.    DNR [x ]   DNI  [  ]   TRIAL OF INTUBATION    Covid - 19 PCR: Negative 11/30    The 4Ms    What Matters Most: see [facility]  Age appropriate Medications/Screen for High Risk Medication: Yes  Mentation: see CAM below  Mobility: defer to physical exam    The Confusion Assessment Method (CAM) Diagnostic Algorithm     1: Acute Onset or Fluctuating Course  - Is there evidence of an acute change in mental status from the patient’s baseline? Did the (abnormal) behavior  fluctuate during the day, that is, tend to come and go, or increase and decrease in severity?  [ ] YES [x ] NO     2: Inattention  - Did the patient have difficulty focusing attention, being easily distractible, or having difficulty keeping track of what was being said?  [ ] YES [x ] NO     3: Disorganized thinking  -Was the patient’s thinking disorganized or incoherent, such as rambling or irrelevant conversation, unclear or illogical flow of ideas, or unpredictable switching from subject to subject?  [ ] YES [x ] NO    4: Altered Level of consciousness?  [ ] YES [x ] NO    The diagnosis of delirium by CAM requires the presence of features 1 and 2 and either 3 or 4.    PRESSORS: [ ] YES [x ] NO  DAPTOmycin IVPB 350 milliGRAM(s) IV Intermittent every 24 hours  fluconAZOLE IVPB 200 milliGRAM(s) IV Intermittent every 24 hours  fluconAZOLE IVPB      rifAXIMin 550 milliGRAM(s) Oral two times a day    Cardiovascular:  Heart Failure  Acute   Acute on Chronic  Chronic       furosemide    Tablet 20 milliGRAM(s) Oral daily    Pulmonary:  ALBUTerol    90 MICROgram(s) HFA Inhaler 2 Puff(s) Inhalation every 6 hours PRN    Hematalogic:  enoxaparin Injectable 50 milliGRAM(s) SubCutaneous daily    Other:  acetaminophen   Tablet .. 650 milliGRAM(s) Oral every 6 hours PRN  ascorbic acid 500 milliGRAM(s) Oral two times a day  chlorhexidine 2% Cloths 1 Application(s) Topical daily  dextrose 5%. 1000 milliLiter(s) IV Continuous <Continuous>  lactobacillus acidophilus 1 Tablet(s) Oral daily  multivitamin/minerals 1 Tablet(s) Oral daily  nystatin Powder 1 Application(s) Topical two times a day  pantoprazole   Suspension 40 milliGRAM(s) Enteral Tube daily  sodium chloride 0.9% lock flush 10 milliLiter(s) IV Push every 1 hour PRN  zinc sulfate 220 milliGRAM(s) Oral daily    acetaminophen   Tablet .. 650 milliGRAM(s) Oral every 6 hours PRN  ALBUTerol    90 MICROgram(s) HFA Inhaler 2 Puff(s) Inhalation every 6 hours PRN  ascorbic acid 500 milliGRAM(s) Oral two times a day  chlorhexidine 2% Cloths 1 Application(s) Topical daily  DAPTOmycin IVPB 350 milliGRAM(s) IV Intermittent every 24 hours  dextrose 5%. 1000 milliLiter(s) IV Continuous <Continuous>  enoxaparin Injectable 50 milliGRAM(s) SubCutaneous daily  fluconAZOLE IVPB 200 milliGRAM(s) IV Intermittent every 24 hours  fluconAZOLE IVPB      furosemide    Tablet 20 milliGRAM(s) Oral daily  lactobacillus acidophilus 1 Tablet(s) Oral daily  multivitamin/minerals 1 Tablet(s) Oral daily  nystatin Powder 1 Application(s) Topical two times a day  pantoprazole   Suspension 40 milliGRAM(s) Enteral Tube daily  rifAXIMin 550 milliGRAM(s) Oral two times a day  sodium chloride 0.9% lock flush 10 milliLiter(s) IV Push every 1 hour PRN  zinc sulfate 220 milliGRAM(s) Oral daily    Drug Dosing Weight  Height (cm): 167.6 (21 Oct 2020 02:26)  Weight (kg): 56.2 (21 Oct 2020 02:26)  BMI (kg/m2): 20 (21 Oct 2020 02:26)  BSA (m2): 1.63 (21 Oct 2020 02:26)    CENTRAL LINE: [ ] YES [x ] NO  LOCATION:   DATE INSERTED:  REMOVE: [ ] YES [ ] NO  EXPLAIN:    TREJO: [ ] YES [x ] NO    DATE INSERTED:  REMOVE:  [ ] YES [ ] NO  EXPLAIN:    PAST MEDICAL & SURGICAL HISTORY:  Anasarca    Pulmonary embolism    DVT (deep venous thrombosis)    SBO (small bowel obstruction)    H/O exploratory laparotomy              PHYSICAL EXAM:    GENERAL: NAD, cachectic  HEAD:  Atraumatic, Normocephalic  EYES: EOMI, PERRLA, conjunctiva and sclera clear  ENMT: No tonsillar erythema, exudates, or enlargement; Moist mucous membranes, Good dentition, No lesions  NECK: Supple, No JVD, Normal thyroid  NERVOUS SYSTEM:  Alert & Oriented X3, Good concentration; Motor Strength 5/5 B/L upper and lower extremities; DTRs 2+ intact and symmetric  CHEST/LUNG: Clear to percussion bilaterally; No rales, rhonchi, wheezing, or rubs  HEART: Regular rate and rhythm; No murmurs, rubs, or gallops  ABDOMEN: Soft, Nontender, Nondistended; Bowel sounds present  EXTREMITIES:  2+ Peripheral Pulses, No clubbing, cyanosis, or edema  LYMPH: No lymphadenopathy noted  SKIN: No rashes or lesions      LABS:  CBC Full  -  ( 07 Dec 2020 07:22 )  WBC Count : 7.09 K/uL  RBC Count : 2.83 M/uL  Hemoglobin : 8.2 g/dL  Hematocrit : 27.6 %  Platelet Count - Automated : 280 K/uL  Mean Cell Volume : 97.5 fl  Mean Cell Hemoglobin : 29.0 pg  Mean Cell Hemoglobin Concentration : 29.7 gm/dL  Auto Neutrophil # : 4.79 K/uL  Auto Lymphocyte # : 1.54 K/uL  Auto Monocyte # : 0.48 K/uL  Auto Eosinophil # : 0.21 K/uL  Auto Basophil # : 0.05 K/uL  Auto Neutrophil % : 67.5 %  Auto Lymphocyte % : 21.7 %  Auto Monocyte % : 6.8 %  Auto Eosinophil % : 3.0 %  Auto Basophil % : 0.7 %    12-07    147<H>  |  110<H>  |  12  ----------------------------<  84  4.0   |  32<H>  |  0.64    Ca    8.4      07 Dec 2020 07:22  Phos  2.8     12-07  Mg     1.8     12-07    TPro  6.3  /  Alb  1.7<L>  /  TBili  1.4<H>  /  DBili  x   /  AST  58<H>  /  ALT  42  /  AlkPhos  279<H>  12-07              [  ]  DVT Prophylaxis  [  ]  Nutrition, Brand, Rate         Goal Rate        Abnormal Nutritional Status -  Malnutrition   Cachexia      Morbid Obesity BMI >/=40    RADIOLOGY & ADDITIONAL STUDIES:  ***    Goals of Care Discussion with Family/Proxy/Other   - see note from/family meeting set up for...     MARIBETH PADILLA    SCU progress note    INTERVAL HPI/OVERNIGHT EVENTS: ***No overnight events.    DNR [x ]   DNI  [  ]   TRIAL OF INTUBATION    Covid - 19 PCR: Negative 11/30    The 4Ms    What Matters Most: see [facility]  Age appropriate Medications/Screen for High Risk Medication: Yes  Mentation: see CAM below  Mobility: defer to physical exam    The Confusion Assessment Method (CAM) Diagnostic Algorithm     1: Acute Onset or Fluctuating Course  - Is there evidence of an acute change in mental status from the patient’s baseline? Did the (abnormal) behavior  fluctuate during the day, that is, tend to come and go, or increase and decrease in severity?  [ ] YES [x ] NO     2: Inattention  - Did the patient have difficulty focusing attention, being easily distractible, or having difficulty keeping track of what was being said?  [ ] YES [x ] NO     3: Disorganized thinking  -Was the patient’s thinking disorganized or incoherent, such as rambling or irrelevant conversation, unclear or illogical flow of ideas, or unpredictable switching from subject to subject?  [ ] YES [x ] NO    4: Altered Level of consciousness?  [ ] YES [x ] NO    The diagnosis of delirium by CAM requires the presence of features 1 and 2 and either 3 or 4.    PRESSORS: [ ] YES [x ] NO  DAPTOmycin IVPB 350 milliGRAM(s) IV Intermittent every 24 hours  fluconAZOLE IVPB 200 milliGRAM(s) IV Intermittent every 24 hours  fluconAZOLE IVPB      rifAXIMin 550 milliGRAM(s) Oral two times a day    Cardiovascular:  Heart Failure  Acute   Acute on Chronic  Chronic       furosemide    Tablet 20 milliGRAM(s) Oral daily    Pulmonary:  ALBUTerol    90 MICROgram(s) HFA Inhaler 2 Puff(s) Inhalation every 6 hours PRN    Hematalogic:  enoxaparin Injectable 50 milliGRAM(s) SubCutaneous daily    Other:  acetaminophen   Tablet .. 650 milliGRAM(s) Oral every 6 hours PRN  ascorbic acid 500 milliGRAM(s) Oral two times a day  chlorhexidine 2% Cloths 1 Application(s) Topical daily  dextrose 5%. 1000 milliLiter(s) IV Continuous <Continuous>  lactobacillus acidophilus 1 Tablet(s) Oral daily  multivitamin/minerals 1 Tablet(s) Oral daily  nystatin Powder 1 Application(s) Topical two times a day  pantoprazole   Suspension 40 milliGRAM(s) Enteral Tube daily  sodium chloride 0.9% lock flush 10 milliLiter(s) IV Push every 1 hour PRN  zinc sulfate 220 milliGRAM(s) Oral daily    acetaminophen   Tablet .. 650 milliGRAM(s) Oral every 6 hours PRN  ALBUTerol    90 MICROgram(s) HFA Inhaler 2 Puff(s) Inhalation every 6 hours PRN  ascorbic acid 500 milliGRAM(s) Oral two times a day  chlorhexidine 2% Cloths 1 Application(s) Topical daily  DAPTOmycin IVPB 350 milliGRAM(s) IV Intermittent every 24 hours  dextrose 5%. 1000 milliLiter(s) IV Continuous <Continuous>  enoxaparin Injectable 50 milliGRAM(s) SubCutaneous daily  fluconAZOLE IVPB 200 milliGRAM(s) IV Intermittent every 24 hours  fluconAZOLE IVPB      furosemide    Tablet 20 milliGRAM(s) Oral daily  lactobacillus acidophilus 1 Tablet(s) Oral daily  multivitamin/minerals 1 Tablet(s) Oral daily  nystatin Powder 1 Application(s) Topical two times a day  pantoprazole   Suspension 40 milliGRAM(s) Enteral Tube daily  rifAXIMin 550 milliGRAM(s) Oral two times a day  sodium chloride 0.9% lock flush 10 milliLiter(s) IV Push every 1 hour PRN  zinc sulfate 220 milliGRAM(s) Oral daily    Drug Dosing Weight  Height (cm): 167.6 (21 Oct 2020 02:26)  Weight (kg): 56.2 (21 Oct 2020 02:26)  BMI (kg/m2): 20 (21 Oct 2020 02:26)  BSA (m2): 1.63 (21 Oct 2020 02:26)    CENTRAL LINE: [ ] YES [x ] NO  LOCATION:   DATE INSERTED:  REMOVE: [ ] YES [ ] NO  EXPLAIN:    TREJO: [ ] YES [x ] NO    DATE INSERTED:  REMOVE:  [ ] YES [ ] NO  EXPLAIN:    PAST MEDICAL & SURGICAL HISTORY:  Anasarca    Pulmonary embolism    DVT (deep venous thrombosis)    SBO (small bowel obstruction)    H/O exploratory laparotomy              PHYSICAL EXAM:    GENERAL: NAD, cachectic  HEAD:  Atraumatic, Normocephalic  EYES: EOMI, PERRLA, conjunctiva and sclera clear  ENMT: No tonsillar erythema, exudates  NECK: Supple, No JVD  NERVOUS SYSTEM:  Alert & Oriented X1/2, Moving all extremities  CHEST/LUNG: Diminished breath sounds bilateral bases  HEART: Regular rate and rhythm; No murmurs, rubs, or gallops  ABDOMEN: Soft, Nontender, Nondistended; Bowel sounds present  EXTREMITIES:  2+ Peripheral Pulses, No clubbing, cyanosis, or edema  LYMPH: No lymphadenopathy noted  SKIN: Unstageable sacrum      LABS:  CBC Full  -  ( 07 Dec 2020 07:22 )  WBC Count : 7.09 K/uL  RBC Count : 2.83 M/uL  Hemoglobin : 8.2 g/dL  Hematocrit : 27.6 %  Platelet Count - Automated : 280 K/uL  Mean Cell Volume : 97.5 fl  Mean Cell Hemoglobin : 29.0 pg  Mean Cell Hemoglobin Concentration : 29.7 gm/dL  Auto Neutrophil # : 4.79 K/uL  Auto Lymphocyte # : 1.54 K/uL  Auto Monocyte # : 0.48 K/uL  Auto Eosinophil # : 0.21 K/uL  Auto Basophil # : 0.05 K/uL  Auto Neutrophil % : 67.5 %  Auto Lymphocyte % : 21.7 %  Auto Monocyte % : 6.8 %  Auto Eosinophil % : 3.0 %  Auto Basophil % : 0.7 %    12-07    147<H>  |  110<H>  |  12  ----------------------------<  84  4.0   |  32<H>  |  0.64    Ca    8.4      07 Dec 2020 07:22  Phos  2.8     12-07  Mg     1.8     12-07    TPro  6.3  /  Alb  1.7<L>  /  TBili  1.4<H>  /  DBili  x   /  AST  58<H>  /  ALT  42  /  AlkPhos  279<H>  12-07              [  ]  DVT Prophylaxis  [  ]  Nutrition, Brand, Rate         Goal Rate        Abnormal Nutritional Status -  Malnutrition   Cachexia         RADIOLOGY & ADDITIONAL STUDIES:  ***  < from: Xray Chest 1 View-PORTABLE IMMEDIATE (Xray Chest 1 View-PORTABLE IMMEDIATE .) (11.30.20 @ 15:01) >    Heart is enlarged.    There is atelectatic consolidation of the left lower lobe. There is significant infiltrate in most of the right midlung field with concentration in the right lower lobe with associated effusion. The infiltrate has increased in the right upper lobe compared to November 29.    Present film shows a left PICC line with tip in superior vena cava.    IMPRESSION: Advanced bilateral lung processes somewhat increased in the right upper lobe. Left PICC line inserted.      < end of copied text >    Goals of Care Discussion with Family/Proxy/Other   - see note from 11/18

## 2020-12-08 NOTE — PROGRESS NOTE ADULT - SUBJECTIVE AND OBJECTIVE BOX
Patient is seen and examined at the bed side, spiked fever last night, currently is afebrile.  The WBC count and creatinine stay normal.      REVIEW OF SYSTEMS: All other review systems are negative      ALLERGIES: No Known Allergies      Vital Signs Last 24 Hrs  T(C): 37.3 (08 Dec 2020 14:50), Max: 38.4 (07 Dec 2020 19:02)  T(F): 99.1 (08 Dec 2020 14:50), Max: 101.1 (07 Dec 2020 19:02)  HR: 91 (08 Dec 2020 14:50) (91 - 116)  BP: 103/65 (08 Dec 2020 14:50) (103/65 - 114/63)  BP(mean): --  RR: 16 (08 Dec 2020 14:50) (16 - 20)  SpO2: 100% (08 Dec 2020 14:50) (94% - 100%)      PHYSICAL EXAM:  GENERAL: Not in distress, on oxygen via NC  CHEST/LUNG: Not using accessory muscles   HEART: s1 and s2 present  ABDOMEN:  Nontender and  Nondistended  EXTREMITIES: B/L UE edematous improved  CNS: Awake and Alert         LABS:                        8.7    7.67  )-----------( 290      ( 08 Dec 2020 06:45 )             30.5                  9.9    9.41  )-----------( 269      ( 24 Nov 2020 06:38 )             31.5                           9.4    13.14 )-----------( 149      ( 14 Nov 2020 07:58 )             28.7         12-08    144  |  106  |  11  ----------------------------<  106<H>  4.1   |  34<H>  |  0.58    Ca    8.4      08 Dec 2020 06:45  Phos  4.0     12-08  Mg     1.7     12-08    TPro  6.7  /  Alb  1.8<L>  /  TBili  1.4<H>  /  DBili  x   /  AST  52<H>  /  ALT  38  /  AlkPhos  261<H>  12-08    12-07    147<H>  |  110<H>  |  12  ----------------------------<  84  4.0   |  32<H>  |  0.64    Ca    8.4      07 Dec 2020 07:22  Phos  2.8     12-07  Mg     1.8     12-07    TPro  6.3  /  Alb  1.7<L>  /  TBili  1.4<H>  /  DBili  x   /  AST  58<H>  /  ALT  42  /  AlkPhos  279<H>  12-07      11-06    138  |  104  |  37<H>  ----------------------------<  81  3.9   |  25  |  2.37<H>    Ca    8.1<L>      06 Nov 2020 06:20  Phos  3.4     11-06  Mg     2.0     11-06    TPro  5.1<L>  /  Alb  2.8<L>  /  TBili  9.2<H>  /  DBili  x   /  AST  233<H>  /  ALT  99<H>  /  AlkPhos  96  11-06      Vancomycin Level, Trough (11.07.20 @ 21:49)   Vancomycin Level, Trough: 16.1:     Vancomycin Level, Trough (11.07.20 @ 07:08) : 19.5  Vancomycin Level, Trough (11.06.20 @ 06:20) : 19.9:   Procalcitonin, Serum (11.28.20 @ 15:38) : 1.55:       MEDICATIONS  (STANDING):    ascorbic acid 500 milliGRAM(s) Oral two times a day  chlorhexidine 2% Cloths 1 Application(s) Topical daily  DAPTOmycin IVPB 350 milliGRAM(s) IV Intermittent every 24 hours  dextrose 5%. 1000 milliLiter(s) (40 mL/Hr) IV Continuous <Continuous>  enoxaparin Injectable 50 milliGRAM(s) SubCutaneous daily  fluconAZOLE IVPB 200 milliGRAM(s) IV Intermittent every 24 hours  fluconAZOLE IVPB      furosemide    Tablet 20 milliGRAM(s) Oral daily  lactobacillus acidophilus 1 Tablet(s) Oral daily  multivitamin/minerals 1 Tablet(s) Oral daily  nystatin Powder 1 Application(s) Topical two times a day  pantoprazole   Suspension 40 milliGRAM(s) Enteral Tube daily  rifAXIMin 550 milliGRAM(s) Oral two times a day  zinc sulfate 220 milliGRAM(s) Oral daily        RADIOLOGY & ADDITIONAL TESTS:      11/27/20 : Xray Chest 1 View- PORTABLE-Urgent (Xray Chest 1 View- PORTABLE-Urgent .) (11.27.20 @ 06:53) Increased interstitial lung markings with bilateral consolidations right greater than left. Small right pleural effusion. Overall worsening compared to prior exam.      11/24/20 : MR Pelvis Bony Only No Cont (11.24.20 @ 18:02) : Osteomyelitis of the lower sacrum and coccyx with overlying cutaneous ulceration,      11/19/20 : Xray Chest 1 View- PORTABLE-Urgent (Xray Chest 1 View- PORTABLE-Urgent .) (11.19.20 @ 23:53): Improvement in bilateral airspace disease with persistent bibasilar consolidation and small effusions.    11/4/20 : Xray Chest 1 View- PORTABLE-Routine (Xray Chest 1 View- PORTABLE-Routine in AM.) (11.04.20 @ 10:59) Reexpansion of the left lung with residual left pleural effusion and/or infiltrate.  Right pulmonary edema unchanged.  Tubes and catheters in satisfactory position.      10/25/20: Xray Chest 1 View- PORTABLE-Routine (Xray Chest 1 View- PORTABLE-Routine in AM.) (10.25.20 @ 09:21) Frontal expiratory view of the chest shows the heart to be similar in size. Endotracheal tube, right jugular line and feeding tube remain present. The lungs show similar left upper lobe infiltrate with progression of right perihilar infiltrate. Pleural effusions are similar. There is no evidence of pneumothorax.    10/21/20 : CT Abdomen and Pelvis w/ Oral Cont and w/ IV Cont (10.21.20 @ 15:59) Mural thickening of the left colon and rectum. Liquid stool in the colon. Apparent segmental mural thickening of the mid to distal small bowel and the proximal duodenum. Findings may represent nonspecific enterocolitis, noninfectious inflammatory bowel disease, or ischemic bowel. Clinical correlation is recommended. The celiac axis artery, SMA, and KINA are patent without stenosis.    Dilatation of the mid small bowel is again noted. Oral contrast has reached the terminal ileum. Findings may represent small bowel obstruction, or ileus related to nonspecific enterocolitis.   Cholelithiasis. Moderate to large ascites in the abdomen, increased since the previous examination. 5 mm nonspecific noncalcified left upper lobe lung nodule; if the patient's is in the high risk category (i.e. smoker), follow-up chest CT may be pursued in 12 months to ensure stability. Combination of atelectasis and consolidation in the left lower lobe.  Possible 1.0 cm hypodense lesion in the left lobe of the thyroid. Thyroid ultrasound may be pursued for further evaluation.   Mild bilateral pleural effusions.  Aging determinate compression fracture at T5 vertebra.        MICROBIOLOGY DATA:    MRSA/MSSA PCR (12.01.20 @ 01:16)   MRSA PCR Result.: Detected:    MRSA/MSSA PCR (11.28.20 @ 11:34)   MRSA PCR Result.: Detected:     Urine Microscopic-Add On (NC) (11.20.20 @ 04:12)   Red Blood Cell - Urine: 5-10 /HPF   White Blood Cell - Urine: 11-25 /HPF   Calcium Oxalate Crystals: Few /HPF   Bacteria: Moderate /HPF   Comment - Urine: few amorphous urates   Epithelial Cells: Few /HPF     Culture - Blood (11.19.20 @ 10:19)   Specimen Source: .Blood Blood-Peripheral   Culture Results: No growth to date.     Culture - Surgical Swab (11.12.20 @ 05:01)   - Ampicillin: R >8 Predicts results to ampicillin/sulbactam, amoxacillin-clavulanate and piperacillin-tazobactam.   - Levofloxacin: R >4   - Linezolid: S 1   - Tetra/Doxy: R >8   - Vancomycin: R >16   Specimen Source: .Surgical Swab Sacral decub   Culture Results:   Few Enterococcus faecium (vancomycin resistant)   Rare Candida albicans "Susceptibilities not performed"   Organism Identification: Enterococcus faecium (vancomycin resistant)   Organism: Enterococcus faecium (vancomycin resistant)   Method Type: STEWART     Culture - Surgical Swab (11.12.20 @ 05:01)   Specimen Source: .Surgical Swab Sacral decub   Culture Results:  Few Enterococcus faecium Susceptibility to follow.   Rare Candida albicans "Susceptibilities not performed"     Culture - Sputum . (10.26.20 @ 00:26)   - Ampicillin/Sulbactam: R <=8/4   - Cefazolin: R >16   - Ceftazidime: I 16   - Clindamycin: R >4   - Erythromycin: R >4   - Gentamicin: S <=1 Should not be used as monotherapy   - Levofloxacin: S <=0.5   - Linezolid: S 2   - Oxacillin: R >2   - Penicillin: R >8   - RIF- Rifampin: S <=1 Should not be used as monotherapy   - Tetra/Doxy: S <=1   - Trimethoprim/Sulfamethoxazole: S <=0.5/9.5   - Trimethoprim/Sulfamethoxazole: S <=0.5/9.5   - Vancomycin: S 1     MRSA/MSSA PCR (10.21.20 @ 09:41)   MRSA PCR Result.: Detected:       Culture - Blood (10.20.20 @ 22:09)   Specimen Source: .Blood Blood   Culture Results:  No growth to date.     Culture - Blood (10.20.20 @ 22:09)   Specimen Source: .Blood Blood   Culture Results:   No growth to date.     Culture - Blood in AM (10.16.20 @ 10:13)   Specimen Source: .Blood Blood-Peripheral   Culture Results: No growth to date.     Culture - Blood in AM (10.16.20 @ 10:13)   Specimen Source: .Blood Blood-Peripheral   Culture Results: No growth to date.     Culture - Blood (10.13.20 @ 22:23)   Growth in anaerobic bottle: Gram Negative Rods   Specimen Source: .Blood Blood-Peripheral   Organism: Blood Culture PCR   Culture Results: Growth in anaerobic bottle: Bacteroides fragilis   "Susceptibilities not performed"     Culture - Blood (10.13.20 @ 22:23)   Specimen Source: .Blood Blood-Peripheral   Culture Results:   No growth to date.             Assessment and Plan:   · Assessment	  Patient is a 65y old  Female from home, lives with daughter, ambulates with walker with PMH of recurrent SBO's, s/p exp lap, SB resection in 2015, ex lap, ALBINA in 2018, DVT, PE, on Xarelto, IVC filter, chronic leg swelling, and anasarca, Now send in to the ER by her PCP, Dr. Ross, for evaluation of  generalized weakness and hypotension BP of 80/40 during office visit. On admission, she found to have no fever and BP was in lower normal range and no Leukocytosis. The CXR is clear and CT abd/pelvis consistent with Ileus. The ID consult requested to assist with evaluation of infectious etiology of episode of hypotension. Found to have Bacteroides bacteremia and now developed septic shock on Cefepime and Flagyl. Hence transferred to ICU. 10/20/20.    # Hypotension  at office- BP of 80/40 - resolved   #  Bacteroides fragilis Bacteremia - 10/13/20 - ? source most likely GI- Repeat Blood Cxs have NGTD 10/16/20  # Ileus  - h/o recurrent SBO and s/p EXp. LAp  # COVID 19 negative   # Septic shock ( Hypothermia + hypotensive)- transferred to ICU since requiring pressor- source GI, The CT abd/pelvis shows nonspecific enterocolitis, noninfectious inflammatory bowel disease, or ischemic bowel, along with ileus.   # Pneumonia- HCAP vs Aspiration - on CXR 10/24 and CT chest 10/21- sputum Cx grew Methicillin resistant Staphylococcus aureus  and Stenotrophomonas maltophilia.  # S/p extubated 11/9/20  # Infected sacral ulcer- s/p debridement and culture grew Enterococcus, VRE and Candida, sensitivity is pending- The MRI of pelvis shows  Osteomyelitis of the lower sacrum and coccyx with overlying cutaneous ulceration.   # Fever- 11/8 and 11/19- BCX NGTD 11/19 - The CXR shows Improvement in bilateral airspace disease with persistent bibasilar consolidation. She is s/p removal of velazquez catheter and passed TOV.  # HCAP- 11/27/20,  Intermittent fever and CXR shows  bilateral consolidations right greater than left. - The procalcitonin level is 1.55  # s/p PICC line placement 11/30      would recommend:      1. Monitor Temp. and c/w supportive care, Follow up CXR and procalcitonin level  2. Please Add Mupirocin ointment to apply to both nares  3. Continue Daptomycin to cover VRE  and  Fluconazole to cover Candida in sacral wound culture X 6  weeks to treat Osteomyelitis,  might change to oral Linezolid  and oral fluconazole on discharge to continue until  12/29/20  4. Aspiration precaution  5. Frequent repositioning   6. Please remove the DEEP Line if fever persists     d/w  Covering NPCammie     Attending Attestation:    Spent more than 45 minutes on total encounter, more than 50 % of the visit was spent counseling and/or coordinating care by the Attending physician.         Patient is seen and examined at the bed side, spiked fever last night, currently is afebrile.  The WBC count and creatinine stay normal.      REVIEW OF SYSTEMS: All other review systems are negative      ALLERGIES: No Known Allergies      Vital Signs Last 24 Hrs  T(C): 37.3 (08 Dec 2020 14:50), Max: 38.4 (07 Dec 2020 19:02)  T(F): 99.1 (08 Dec 2020 14:50), Max: 101.1 (07 Dec 2020 19:02)  HR: 91 (08 Dec 2020 14:50) (91 - 116)  BP: 103/65 (08 Dec 2020 14:50) (103/65 - 114/63)  BP(mean): --  RR: 16 (08 Dec 2020 14:50) (16 - 20)  SpO2: 100% (08 Dec 2020 14:50) (94% - 100%)      PHYSICAL EXAM:  GENERAL: Not in distress, on oxygen via NC  CHEST/LUNG: Not using accessory muscles   HEART: s1 and s2 present  ABDOMEN:  Nontender and  Nondistended  EXTREMITIES: B/L UE edematous improved  CNS: Awake and Alert         LABS:                        8.7    7.67  )-----------( 290      ( 08 Dec 2020 06:45 )             30.5                  9.9    9.41  )-----------( 269      ( 24 Nov 2020 06:38 )             31.5                           9.4    13.14 )-----------( 149      ( 14 Nov 2020 07:58 )             28.7         12-08    144  |  106  |  11  ----------------------------<  106<H>  4.1   |  34<H>  |  0.58    Ca    8.4      08 Dec 2020 06:45  Phos  4.0     12-08  Mg     1.7     12-08    TPro  6.7  /  Alb  1.8<L>  /  TBili  1.4<H>  /  DBili  x   /  AST  52<H>  /  ALT  38  /  AlkPhos  261<H>  12-08    12-07    147<H>  |  110<H>  |  12  ----------------------------<  84  4.0   |  32<H>  |  0.64    Ca    8.4      07 Dec 2020 07:22  Phos  2.8     12-07  Mg     1.8     12-07    TPro  6.3  /  Alb  1.7<L>  /  TBili  1.4<H>  /  DBili  x   /  AST  58<H>  /  ALT  42  /  AlkPhos  279<H>  12-07      11-06    138  |  104  |  37<H>  ----------------------------<  81  3.9   |  25  |  2.37<H>    Ca    8.1<L>      06 Nov 2020 06:20  Phos  3.4     11-06  Mg     2.0     11-06    TPro  5.1<L>  /  Alb  2.8<L>  /  TBili  9.2<H>  /  DBili  x   /  AST  233<H>  /  ALT  99<H>  /  AlkPhos  96  11-06      Vancomycin Level, Trough (11.07.20 @ 21:49)   Vancomycin Level, Trough: 16.1:     Vancomycin Level, Trough (11.07.20 @ 07:08) : 19.5  Vancomycin Level, Trough (11.06.20 @ 06:20) : 19.9:   Procalcitonin, Serum (11.28.20 @ 15:38) : 1.55:       MEDICATIONS  (STANDING):    ascorbic acid 500 milliGRAM(s) Oral two times a day  chlorhexidine 2% Cloths 1 Application(s) Topical daily  DAPTOmycin IVPB 350 milliGRAM(s) IV Intermittent every 24 hours  dextrose 5%. 1000 milliLiter(s) (40 mL/Hr) IV Continuous <Continuous>  enoxaparin Injectable 50 milliGRAM(s) SubCutaneous daily  fluconAZOLE IVPB 200 milliGRAM(s) IV Intermittent every 24 hours  fluconAZOLE IVPB      furosemide    Tablet 20 milliGRAM(s) Oral daily  lactobacillus acidophilus 1 Tablet(s) Oral daily  multivitamin/minerals 1 Tablet(s) Oral daily  nystatin Powder 1 Application(s) Topical two times a day  pantoprazole   Suspension 40 milliGRAM(s) Enteral Tube daily  rifAXIMin 550 milliGRAM(s) Oral two times a day  zinc sulfate 220 milliGRAM(s) Oral daily        RADIOLOGY & ADDITIONAL TESTS:      11/27/20 : Xray Chest 1 View- PORTABLE-Urgent (Xray Chest 1 View- PORTABLE-Urgent .) (11.27.20 @ 06:53) Increased interstitial lung markings with bilateral consolidations right greater than left. Small right pleural effusion. Overall worsening compared to prior exam.      11/24/20 : MR Pelvis Bony Only No Cont (11.24.20 @ 18:02) : Osteomyelitis of the lower sacrum and coccyx with overlying cutaneous ulceration,      11/19/20 : Xray Chest 1 View- PORTABLE-Urgent (Xray Chest 1 View- PORTABLE-Urgent .) (11.19.20 @ 23:53): Improvement in bilateral airspace disease with persistent bibasilar consolidation and small effusions.    11/4/20 : Xray Chest 1 View- PORTABLE-Routine (Xray Chest 1 View- PORTABLE-Routine in AM.) (11.04.20 @ 10:59) Reexpansion of the left lung with residual left pleural effusion and/or infiltrate.  Right pulmonary edema unchanged.  Tubes and catheters in satisfactory position.      10/25/20: Xray Chest 1 View- PORTABLE-Routine (Xray Chest 1 View- PORTABLE-Routine in AM.) (10.25.20 @ 09:21) Frontal expiratory view of the chest shows the heart to be similar in size. Endotracheal tube, right jugular line and feeding tube remain present. The lungs show similar left upper lobe infiltrate with progression of right perihilar infiltrate. Pleural effusions are similar. There is no evidence of pneumothorax.    10/21/20 : CT Abdomen and Pelvis w/ Oral Cont and w/ IV Cont (10.21.20 @ 15:59) Mural thickening of the left colon and rectum. Liquid stool in the colon. Apparent segmental mural thickening of the mid to distal small bowel and the proximal duodenum. Findings may represent nonspecific enterocolitis, noninfectious inflammatory bowel disease, or ischemic bowel. Clinical correlation is recommended. The celiac axis artery, SMA, and KINA are patent without stenosis.    Dilatation of the mid small bowel is again noted. Oral contrast has reached the terminal ileum. Findings may represent small bowel obstruction, or ileus related to nonspecific enterocolitis.   Cholelithiasis. Moderate to large ascites in the abdomen, increased since the previous examination. 5 mm nonspecific noncalcified left upper lobe lung nodule; if the patient's is in the high risk category (i.e. smoker), follow-up chest CT may be pursued in 12 months to ensure stability. Combination of atelectasis and consolidation in the left lower lobe.  Possible 1.0 cm hypodense lesion in the left lobe of the thyroid. Thyroid ultrasound may be pursued for further evaluation.   Mild bilateral pleural effusions.  Aging determinate compression fracture at T5 vertebra.        MICROBIOLOGY DATA:    MRSA/MSSA PCR (12.01.20 @ 01:16)   MRSA PCR Result.: Detected:    MRSA/MSSA PCR (11.28.20 @ 11:34)   MRSA PCR Result.: Detected:     Urine Microscopic-Add On (NC) (11.20.20 @ 04:12)   Red Blood Cell - Urine: 5-10 /HPF   White Blood Cell - Urine: 11-25 /HPF   Calcium Oxalate Crystals: Few /HPF   Bacteria: Moderate /HPF   Comment - Urine: few amorphous urates   Epithelial Cells: Few /HPF     Culture - Blood (11.19.20 @ 10:19)   Specimen Source: .Blood Blood-Peripheral   Culture Results: No growth to date.     Culture - Surgical Swab (11.12.20 @ 05:01)   - Ampicillin: R >8 Predicts results to ampicillin/sulbactam, amoxacillin-clavulanate and piperacillin-tazobactam.   - Levofloxacin: R >4   - Linezolid: S 1   - Tetra/Doxy: R >8   - Vancomycin: R >16   Specimen Source: .Surgical Swab Sacral decub   Culture Results:   Few Enterococcus faecium (vancomycin resistant)   Rare Candida albicans "Susceptibilities not performed"   Organism Identification: Enterococcus faecium (vancomycin resistant)   Organism: Enterococcus faecium (vancomycin resistant)   Method Type: STEWART     Culture - Surgical Swab (11.12.20 @ 05:01)   Specimen Source: .Surgical Swab Sacral decub   Culture Results:  Few Enterococcus faecium Susceptibility to follow.   Rare Candida albicans "Susceptibilities not performed"     Culture - Sputum . (10.26.20 @ 00:26)   - Ampicillin/Sulbactam: R <=8/4   - Cefazolin: R >16   - Ceftazidime: I 16   - Clindamycin: R >4   - Erythromycin: R >4   - Gentamicin: S <=1 Should not be used as monotherapy   - Levofloxacin: S <=0.5   - Linezolid: S 2   - Oxacillin: R >2   - Penicillin: R >8   - RIF- Rifampin: S <=1 Should not be used as monotherapy   - Tetra/Doxy: S <=1   - Trimethoprim/Sulfamethoxazole: S <=0.5/9.5   - Trimethoprim/Sulfamethoxazole: S <=0.5/9.5   - Vancomycin: S 1     MRSA/MSSA PCR (10.21.20 @ 09:41)   MRSA PCR Result.: Detected:       Culture - Blood (10.20.20 @ 22:09)   Specimen Source: .Blood Blood   Culture Results:  No growth to date.     Culture - Blood (10.20.20 @ 22:09)   Specimen Source: .Blood Blood   Culture Results:   No growth to date.     Culture - Blood in AM (10.16.20 @ 10:13)   Specimen Source: .Blood Blood-Peripheral   Culture Results: No growth to date.     Culture - Blood in AM (10.16.20 @ 10:13)   Specimen Source: .Blood Blood-Peripheral   Culture Results: No growth to date.     Culture - Blood (10.13.20 @ 22:23)   Growth in anaerobic bottle: Gram Negative Rods   Specimen Source: .Blood Blood-Peripheral   Organism: Blood Culture PCR   Culture Results: Growth in anaerobic bottle: Bacteroides fragilis   "Susceptibilities not performed"     Culture - Blood (10.13.20 @ 22:23)   Specimen Source: .Blood Blood-Peripheral   Culture Results:   No growth to date.

## 2020-12-08 NOTE — PROGRESS NOTE ADULT - SUBJECTIVE AND OBJECTIVE BOX
Patient was seen and examined  Patient is a 65y old  Female who presents with a chief complaint of Hypotension (08 Dec 2020 09:56)      INTERVAL HPI/OVERNIGHT EVENTS:  T(C): 36.2 (12-08-20 @ 05:20), Max: 38.4 (12-07-20 @ 19:02)  HR: 93 (12-08-20 @ 05:20) (93 - 116)  BP: 109/69 (12-08-20 @ 05:20) (109/69 - 119/70)  RR: 16 (12-08-20 @ 05:20) (16 - 20)  SpO2: 100% (12-08-20 @ 05:20) (94% - 100%)  Wt(kg): --  I&O's Summary    07 Dec 2020 07:01  -  08 Dec 2020 07:00  --------------------------------------------------------  IN: 550 mL / OUT: 0 mL / NET: 550 mL        LABS:                        8.7    7.67  )-----------( 290      ( 08 Dec 2020 06:45 )             30.5     12-08    144  |  106  |  11  ----------------------------<  106<H>  4.1   |  34<H>  |  0.58    Ca    8.4      08 Dec 2020 06:45  Phos  4.0     12-08  Mg     1.7     12-08    TPro  6.7  /  Alb  1.8<L>  /  TBili  1.4<H>  /  DBili  x   /  AST  52<H>  /  ALT  38  /  AlkPhos  261<H>  12-08        CAPILLARY BLOOD GLUCOSE      POCT Blood Glucose.: 110 mg/dL (08 Dec 2020 06:05)  POCT Blood Glucose.: 113 mg/dL (07 Dec 2020 23:56)  POCT Blood Glucose.: 114 mg/dL (07 Dec 2020 17:01)  POCT Blood Glucose.: 120 mg/dL (07 Dec 2020 12:06)              MEDICATIONS  (STANDING):  ascorbic acid 500 milliGRAM(s) Oral two times a day  chlorhexidine 2% Cloths 1 Application(s) Topical daily  DAPTOmycin IVPB 350 milliGRAM(s) IV Intermittent every 24 hours  dextrose 5%. 1000 milliLiter(s) (40 mL/Hr) IV Continuous <Continuous>  enoxaparin Injectable 50 milliGRAM(s) SubCutaneous daily  fluconAZOLE IVPB 200 milliGRAM(s) IV Intermittent every 24 hours  fluconAZOLE IVPB      furosemide    Tablet 20 milliGRAM(s) Oral daily  lactobacillus acidophilus 1 Tablet(s) Oral daily  multivitamin/minerals 1 Tablet(s) Oral daily  nystatin Powder 1 Application(s) Topical two times a day  pantoprazole   Suspension 40 milliGRAM(s) Enteral Tube daily  rifAXIMin 550 milliGRAM(s) Oral two times a day  zinc sulfate 220 milliGRAM(s) Oral daily    MEDICATIONS  (PRN):  acetaminophen   Tablet .. 650 milliGRAM(s) Oral every 6 hours PRN Temp greater or equal to 38C (100.4F), Mild Pain (1 - 3), Moderate Pain (4 - 6)  ALBUTerol    90 MICROgram(s) HFA Inhaler 2 Puff(s) Inhalation every 6 hours PRN Shortness of Breath and/or Wheezing  sodium chloride 0.9% lock flush 10 milliLiter(s) IV Push every 1 hour PRN Pre/post blood products, medications, blood draw, and to maintain line patency      RADIOLOGY & ADDITIONAL TESTS:    Imaging Personally Reviewed:  [ ] YES  [ ] NO    REVIEW OF SYSTEMS:  CONSTITUTIONAL: No fever, weight loss, or fatigue  EYES: No eye pain, visual disturbances, or discharge  ENMT:  No difficulty hearing, tinnitus, vertigo; No sinus or throat pain  NECK: No pain or stiffness  BREASTS: No pain, masses, or nipple discharge  RESPIRATORY: No cough, wheezing, chills or hemoptysis; No shortness of breath  CARDIOVASCULAR: No chest pain, palpitations, dizziness, or leg swelling  GASTROINTESTINAL: No abdominal or epigastric pain. No nausea, vomiting, or hematemesis; No diarrhea or constipation. No melena or hematochezia.  GENITOURINARY: No dysuria, frequency, hematuria, or incontinence  NEUROLOGICAL: No headaches, memory loss, loss of strength, numbness, or tremors  SKIN: No itching, burning, rashes, or lesions   LYMPH NODES: No enlarged glands  ENDOCRINE: No heat or cold intolerance; No hair loss  MUSCULOSKELETAL: No joint pain or swelling; No muscle, back, or extremity pain  PSYCHIATRIC: No depression, anxiety, mood swings, or difficulty sleeping  HEME/LYMPH: No easy bruising, or bleeding gums  ALLERY AND IMMUNOLOGIC: No hives or eczema      Consultant(s) Notes Reviewed:  [ x ] YES  [ ] NO    PHYSICAL EXAM:  GENERAL: NAD, well-groomed, well-developed  HEAD:  Atraumatic, Normocephalic  EYES: EOMI, PERRLA, conjunctiva and sclera clear  ENMT: No tonsillar erythema, exudates, or enlargement; Moist mucous membranes, Good dentition, No lesions  NECK: Supple, No JVD, Normal thyroid  NERVOUS SYSTEM:  Alert & Oriented X3, Good concentration; Motor Strength 5/5 B/L upper and lower extremities; DTRs 2+ intact and symmetric  CHEST/LUNG: Clear to percussion bilaterally; No rales, rhonchi, wheezing, or rubs  HEART: Regular rate and rhythm; No murmurs, rubs, or gallops  ABDOMEN: Soft, Nontender, Nondistended; Bowel sounds present  EXTREMITIES:  2+ Peripheral Pulses, No clubbing, cyanosis, or edema  LYMPH: No lymphadenopathy noted  SKIN: No rashes or lesions    Care Discussed with Consultants/Other Providers [ x] YES  [ ] NO

## 2020-12-08 NOTE — PROGRESS NOTE ADULT - PROBLEM SELECTOR PLAN 9
DVT and GI prophylaxis  Continue rifaximin  Needs antibiotics thru 12/28. Dapto thru 12/14. Linezolid 12/14-12/28  OOB daily. Continue PT.

## 2020-12-08 NOTE — PROGRESS NOTE ADULT - PROBLEM SELECTOR PLAN 8
Needs Daptomycin until 12/28  Facilities will not give Dapto  Can switch to linezolid on 12/14-12/28

## 2020-12-08 NOTE — PROGRESS NOTE ADULT - SUBJECTIVE AND OBJECTIVE BOX
MARIBETH PADILLA    SCU progress note    INTERVAL HPI/OVERNIGHT EVENTS: ***No overnight events    DNR [x ]   DNI  [  ]   TRIAL OF INTUBATION    Covid - 19 PCR: negative 11/20    The 4Ms    What Matters Most: see [facility]  Age appropriate Medications/Screen for High Risk Medication: Yes  Mentation: see CAM below  Mobility: defer to physical exam    The Confusion Assessment Method (CAM) Diagnostic Algorithm     1: Acute Onset or Fluctuating Course  - Is there evidence of an acute change in mental status from the patient’s baseline? Did the (abnormal) behavior  fluctuate during the day, that is, tend to come and go, or increase and decrease in severity?  [ ] YES [ x] NO     2: Inattention  - Did the patient have difficulty focusing attention, being easily distractible, or having difficulty keeping track of what was being said?  [ ] YES [ ] NO     3: Disorganized thinking  -Was the patient’s thinking disorganized or incoherent, such as rambling or irrelevant conversation, unclear or illogical flow of ideas, or unpredictable switching from subject to subject?  [ ] YES [x ] NO    4: Altered Level of consciousness?  [ ] YES [x ] NO    The diagnosis of delirium by CAM requires the presence of features 1 and 2 and either 3 or 4.    PRESSORS: [ ] YES [x ] NO  DAPTOmycin IVPB 350 milliGRAM(s) IV Intermittent every 24 hours  fluconAZOLE IVPB 200 milliGRAM(s) IV Intermittent every 24 hours  fluconAZOLE IVPB      rifAXIMin 550 milliGRAM(s) Oral two times a day    Cardiovascular:  Heart Failure  Acute   Acute on Chronic  Chronic       furosemide    Tablet 20 milliGRAM(s) Oral daily    Pulmonary:  ALBUTerol    90 MICROgram(s) HFA Inhaler 2 Puff(s) Inhalation every 6 hours PRN    Hematalogic:  enoxaparin Injectable 50 milliGRAM(s) SubCutaneous daily    Other:  acetaminophen   Tablet .. 650 milliGRAM(s) Oral every 6 hours PRN  ascorbic acid 500 milliGRAM(s) Oral two times a day  chlorhexidine 2% Cloths 1 Application(s) Topical daily  dextrose 5%. 1000 milliLiter(s) IV Continuous <Continuous>  lactobacillus acidophilus 1 Tablet(s) Oral daily  multivitamin/minerals 1 Tablet(s) Oral daily  nystatin Powder 1 Application(s) Topical two times a day  pantoprazole   Suspension 40 milliGRAM(s) Enteral Tube daily  sodium chloride 0.9% lock flush 10 milliLiter(s) IV Push every 1 hour PRN  zinc sulfate 220 milliGRAM(s) Oral daily    acetaminophen   Tablet .. 650 milliGRAM(s) Oral every 6 hours PRN  ALBUTerol    90 MICROgram(s) HFA Inhaler 2 Puff(s) Inhalation every 6 hours PRN  ascorbic acid 500 milliGRAM(s) Oral two times a day  chlorhexidine 2% Cloths 1 Application(s) Topical daily  DAPTOmycin IVPB 350 milliGRAM(s) IV Intermittent every 24 hours  dextrose 5%. 1000 milliLiter(s) IV Continuous <Continuous>  enoxaparin Injectable 50 milliGRAM(s) SubCutaneous daily  fluconAZOLE IVPB 200 milliGRAM(s) IV Intermittent every 24 hours  fluconAZOLE IVPB      furosemide    Tablet 20 milliGRAM(s) Oral daily  lactobacillus acidophilus 1 Tablet(s) Oral daily  multivitamin/minerals 1 Tablet(s) Oral daily  nystatin Powder 1 Application(s) Topical two times a day  pantoprazole   Suspension 40 milliGRAM(s) Enteral Tube daily  rifAXIMin 550 milliGRAM(s) Oral two times a day  sodium chloride 0.9% lock flush 10 milliLiter(s) IV Push every 1 hour PRN  zinc sulfate 220 milliGRAM(s) Oral daily    Drug Dosing Weight  Height (cm): 167.6 (21 Oct 2020 02:26)  Weight (kg): 56.2 (21 Oct 2020 02:26)  BMI (kg/m2): 20 (21 Oct 2020 02:26)  BSA (m2): 1.63 (21 Oct 2020 02:26)    CENTRAL LINE: [ ] YES [x ] NO  LOCATION:   DATE INSERTED:  REMOVE: [ ] YES [ ] NO  EXPLAIN:    TREJO: [ ] YES [x ] NO    DATE INSERTED:  REMOVE:  [ ] YES [ ] NO  EXPLAIN:    PAST MEDICAL & SURGICAL HISTORY:  Anasarca    Pulmonary embolism    DVT (deep venous thrombosis)    SBO (small bowel obstruction)    H/O exploratory laparotomy                12-07 @ 07:01  -  12-08 @ 07:00  --------------------------------------------------------  IN: 550 mL / OUT: 0 mL / NET: 550 mL            PHYSICAL EXAM:    GENERAL: NAD, thin  HEAD:  Atraumatic, Normocephalic  EYES: EOMI, PERRLA, conjunctiva and sclera clear  ENMT: No tonsillar erythema, exudates  NECK: Supple, No JVD  NERVOUS SYSTEM:  Alert & Oriented X1/2 Follows commands. Moving all extremities.  CHEST/LUNG: Diminished breath sounds bilateral bases.  HEART: Regular rate and rhythm; No murmurs, rubs, or gallops  ABDOMEN: Soft, Nontender, Nondistended; Bowel sounds present  EXTREMITIES: Weak bilaterally.+ peripheral pulses. No cyanosis or edema  LYMPH: No lymphadenopathy noted  SKIN:  Unstageable sacrum      LABS:  CBC Full  -  ( 08 Dec 2020 06:45 )  WBC Count : 7.67 K/uL  RBC Count : 3.07 M/uL  Hemoglobin : 8.7 g/dL  Hematocrit : 30.5 %  Platelet Count - Automated : 290 K/uL  Mean Cell Volume : 99.3 fl  Mean Cell Hemoglobin : 28.3 pg  Mean Cell Hemoglobin Concentration : 28.5 gm/dL  Auto Neutrophil # : 5.25 K/uL  Auto Lymphocyte # : 1.54 K/uL  Auto Monocyte # : 0.55 K/uL  Auto Eosinophil # : 0.25 K/uL  Auto Basophil # : 0.06 K/uL  Auto Neutrophil % : 68.3 %  Auto Lymphocyte % : 20.1 %  Auto Monocyte % : 7.2 %  Auto Eosinophil % : 3.3 %  Auto Basophil % : 0.8 %    12-08    144  |  106  |  11  ----------------------------<  106<H>  4.1   |  34<H>  |  0.58    Ca    8.4      08 Dec 2020 06:45  Phos  4.0     12-08  Mg     1.7     12-08    TPro  6.7  /  Alb  1.8<L>  /  TBili  1.4<H>  /  DBili  x   /  AST  52<H>  /  ALT  38  /  AlkPhos  261<H>  12-08              [  ]  DVT Prophylaxis  [  ]  Nutrition, Brand, Rate         Goal Rate        Abnormal Nutritional Status -  Malnutrition   Cachexia          RADIOLOGY & ADDITIONAL STUDIES:  ***    < from: Xray Chest 1 View-PORTABLE IMMEDIATE (Xray Chest 1 View-PORTABLE IMMEDIATE .) (11.30.20 @ 15:01) >  Heart is enlarged.    There is atelectatic consolidation of the left lower lobe. There is significant infiltrate in most of the right midlung field with concentration in the right lower lobe with associated effusion. The infiltrate has increased in the right upper lobe compared to November 29.    Present film shows a left PICC line with tip in superior vena cava.    IMPRESSION: Advanced bilateral lung processes somewhat increased in the right upper lobe. Left PICC line inserted.    < end of copied text >  Goals of Care Discussion with Family/Proxy/Other   - see note from 11/18

## 2020-12-08 NOTE — PROGRESS NOTE ADULT - ASSESSMENT
Patient is a 65y old  Female from home, lives with daughter, ambulates with walker with PMH of recurrent SBO's, s/p exp lap, SB resection in 2015, ex lap, ALBINA in 2018, DVT, PE, on Xarelto, IVC filter, chronic leg swelling, and anasarca, Now send in to the ER by her PCP, Dr. Ross, for evaluation of  generalized weakness and hypotension BP of 80/40 during office visit. On admission, she found to have no fever and BP was in lower normal range and no Leukocytosis. The CXR is clear and CT abd/pelvis consistent with Ileus. The ID consult requested to assist with evaluation of infectious etiology of episode of hypotension. Found to have Bacteroides bacteremia and now developed septic shock on Cefepime and Flagyl. Hence transferred to ICU. 10/20/20.    # Hypotension  at office- BP of 80/40 - resolved   #  Bacteroides fragilis Bacteremia - 10/13/20 - ? source most likely GI- Repeat Blood Cxs have NGTD 10/16/20  # Ileus  - h/o recurrent SBO and s/p EXp. LAp  # COVID 19 negative   # Septic shock ( Hypothermia + hypotensive)- transferred to ICU since requiring pressor- source GI, The CT abd/pelvis shows nonspecific enterocolitis, noninfectious inflammatory bowel disease, or ischemic bowel, along with ileus.   # Pneumonia- HCAP vs Aspiration - on CXR 10/24 and CT chest 10/21- sputum Cx grew Methicillin resistant Staphylococcus aureus  and Stenotrophomonas maltophilia.  # S/p extubated 11/9/20  # Infected sacral ulcer- s/p debridement and culture grew Enterococcus, VRE and Candida, sensitivity is pending- The MRI of pelvis shows  Osteomyelitis of the lower sacrum and coccyx with overlying cutaneous ulceration.   # Fever- 11/8 and 11/19- BCX NGTD 11/19 - The CXR shows Improvement in bilateral airspace disease with persistent bibasilar consolidation. She is s/p removal of velazquez catheter and passed TOV.  # HCAP- 11/27/20,  Intermittent fever and CXR shows  bilateral consolidations right greater than left. - The procalcitonin level is 1.55  # s/p PICC line placement 11/30      would recommend:      1. Monitor Temp. and c/w supportive care, Follow up CXR and procalcitonin level  2. Please Add Mupirocin ointment to apply to both nares  3. Continue Daptomycin to cover VRE  and  Fluconazole to cover Candida in sacral wound culture X 6  weeks to treat Osteomyelitis,  might change to oral Linezolid  and oral fluconazole on discharge to continue until  12/29/20  4. Aspiration precaution  5. Frequent repositioning   6. Please remove the DEEP Line if fever persists     d/w  Covering NPCammie     Attending Attestation:    Spent more than 45 minutes on total encounter, more than 50 % of the visit was spent counseling and/or coordinating care by the Attending physician.

## 2020-12-08 NOTE — PROGRESS NOTE ADULT - NUTRITIONAL ASSESSMENT
This patient has been assessed with a concern for Malnutrition and has been determined to have a diagnosis/diagnoses of Severe protein-calorie malnutrition.  Protein 6.7   Albumin  1.8    This patient is being managed with:   Diet Dysphagia 1 Pureed-Thin Liquids-  Low Sodium  Supplement Feeding Modality:  Oral  Two Papi HN Cans or Servings Per Day:  2       Frequency:  Two Times a day  Ensure Pudding Cans or Servings Per Day:  2       Frequency:  Two Times a day  Entered: Dec  2 2020 11:57AM

## 2020-12-08 NOTE — PROGRESS NOTE ADULT - PROBLEM SELECTOR PLAN 1
Secondary to bacteremia . Bacteroides fragilis per BC on 10/13  Sputum positive for MRSA and Stenotrophomonas  Aspiration pneumonia vs HCAP .   Repeat BC 10/16 and 11/19 NGTD ,UC on 11/20 NGTD   Infected sacral ulcer- s/p debridement and culture grew Enterococcus (VRE) and Candida ( sensitivity is pending)  Leukocytosis resolved, Cdiff negative  MRI sacrum/pelvis shows OM   c/w abx per ID Dapto until 12/28. can switch to linezolid on 12/14 for 14 days.  `PICC  for extended abx (for total of 6 weeks, thru Dec 28th).

## 2020-12-08 NOTE — PROGRESS NOTE ADULT - PROBLEM SELECTOR PLAN 5
S/P surgical debridement  Surgery does not recommend any further surgical interventions.  Continue wound care.  OOB daily.

## 2020-12-08 NOTE — PROGRESS NOTE ADULT - PROBLEM SELECTOR PLAN 7
Multifactorial: Multiple organ failure, Elevated LFTS, Elevated amnionia level and baseline dementia.  Continues to Improve.

## 2020-12-09 NOTE — PROGRESS NOTE ADULT - ASSESSMENT
Patient is a 65y old  Female from home, lives with daughter, ambulates with walker with PMH of recurrent SBO's, s/p exp lap, SB resection in 2015, ex lap, ALBINA in 2018, DVT, PE, on Xarelto, IVC filter, chronic leg swelling, and anasarca, Now send in to the ER by her PCP, Dr. Ross, for evaluation of  generalized weakness and hypotension BP of 80/40 during office visit. On admission, she found to have no fever and BP was in lower normal range and no Leukocytosis. The CXR is clear and CT abd/pelvis consistent with Ileus. The ID consult requested to assist with evaluation of infectious etiology of episode of hypotension. Found to have Bacteroides bacteremia and now developed septic shock on Cefepime and Flagyl. Hence transferred to ICU. 10/20/20.    # Hypotension  at office- BP of 80/40 - resolved   #  Bacteroides fragilis Bacteremia - 10/13/20 - ? source most likely GI- Repeat Blood Cxs have NGTD 10/16/20  # Ileus  - h/o recurrent SBO and s/p EXp. LAp  # COVID 19 negative   # Septic shock ( Hypothermia + hypotensive)- transferred to ICU since requiring pressor- source GI, The CT abd/pelvis shows nonspecific enterocolitis, noninfectious inflammatory bowel disease, or ischemic bowel, along with ileus.   # Pneumonia- HCAP vs Aspiration - on CXR 10/24 and CT chest 10/21- sputum Cx grew Methicillin resistant Staphylococcus aureus  and Stenotrophomonas maltophilia.  # S/p extubated 11/9/20  # Infected sacral ulcer- s/p debridement and culture grew Enterococcus, VRE and Candida, sensitivity is pending- The MRI of pelvis shows  Osteomyelitis of the lower sacrum and coccyx with overlying cutaneous ulceration.   # Fever- 11/8 and 11/19- BCX NGTD 11/19 - The CXR shows Improvement in bilateral airspace disease with persistent bibasilar consolidation. She is s/p removal of velazquez catheter and passed TOV.  # HCAP- 11/27/20,  Intermittent fever and CXR shows  bilateral consolidations right greater than left. - The procalcitonin level is 1.55  # s/p PICC line placement 11/30      would recommend:      1. Monitor Temp. and c/w supportive care, Follow up CXR and procalcitonin level  2. Please Add Mupirocin ointment to apply to both nares  3. Continue Daptomycin to cover VRE  and  Fluconazole to cover Candida in sacral wound culture X 6  weeks to treat Osteomyelitis,  might change to oral Linezolid  and oral fluconazole on discharge to continue until  12/29/20  4. Aspiration precaution  5. Frequent repositioning   6. Please remove the DEEP Line if fever persists     d/w  Covering NPCammie     Attending Attestation:    Spent more than 45 minutes on total encounter, more than 50 % of the visit was spent counseling and/or coordinating care by the Attending physician.     Patient is a 65y old  Female from home, lives with daughter, ambulates with walker with PMH of recurrent SBO's, s/p exp lap, SB resection in 2015, ex lap, ALBINA in 2018, DVT, PE, on Xarelto, IVC filter, chronic leg swelling, and anasarca, Now send in to the ER by her PCP, Dr. Ross, for evaluation of  generalized weakness and hypotension BP of 80/40 during office visit. On admission, she found to have no fever and BP was in lower normal range and no Leukocytosis. The CXR is clear and CT abd/pelvis consistent with Ileus. The ID consult requested to assist with evaluation of infectious etiology of episode of hypotension. Found to have Bacteroides bacteremia and now developed septic shock on Cefepime and Flagyl. Hence transferred to ICU. 10/20/20.    # Hypotension  at office- BP of 80/40 - resolved   #  Bacteroides fragilis Bacteremia - 10/13/20 - ? source most likely GI- Repeat Blood Cxs have NGTD 10/16/20  # Ileus  - h/o recurrent SBO and s/p EXp. LAp  # COVID 19 negative   # Septic shock ( Hypothermia + hypotensive)- transferred to ICU since requiring pressor- source GI, The CT abd/pelvis shows nonspecific enterocolitis, noninfectious inflammatory bowel disease, or ischemic bowel, along with ileus.   # Pneumonia- HCAP vs Aspiration - on CXR 10/24 and CT chest 10/21- sputum Cx grew Methicillin resistant Staphylococcus aureus  and Stenotrophomonas maltophilia.  # S/p extubated 11/9/20  # Infected sacral ulcer- s/p debridement and culture grew Enterococcus, VRE and Candida, sensitivity is pending- The MRI of pelvis shows  Osteomyelitis of the lower sacrum and coccyx with overlying cutaneous ulceration.   # Fever- 11/8 and 11/19- BCX NGTD 11/19 - The CXR shows Improvement in bilateral airspace disease with persistent bibasilar consolidation. She is s/p removal of velazquez catheter and passed TOV.  # HCAP- 11/27/20,  Intermittent fever and CXR shows  bilateral consolidations right greater than left. - The procalcitonin level is 1.55  # s/p PICC line placement 11/30  # Persistent effusions left greater than right      would recommend:    1. Please Add Mupirocin ointment to apply to both nares  2. Continue Daptomycin to cover VRE  and  Fluconazole to cover Candida in sacral wound culture X 6  weeks to treat Osteomyelitis,  might change to oral Linezolid  and oral fluconazole on discharge to continue until  12/29/20  3. Aspiration precaution  4. Frequent repositioning   5. OOB to chair       Attending Attestation:    Spent more than 45 minutes on total encounter, more than 50 % of the visit was spent counseling and/or coordinating care by the Attending physician.

## 2020-12-09 NOTE — PROGRESS NOTE ADULT - SUBJECTIVE AND OBJECTIVE BOX
Patient was seen and examined  Patient is a 65y old  Female who presents with a chief complaint of Hypotension (09 Dec 2020 08:43)      INTERVAL HPI/OVERNIGHT EVENTS:  T(C): 37.6 (12-09-20 @ 05:10), Max: 37.6 (12-09-20 @ 05:10)  HR: 100 (12-09-20 @ 05:10) (91 - 100)  BP: 95/62 (12-09-20 @ 05:10) (95/62 - 103/65)  RR: 16 (12-09-20 @ 05:10) (16 - 18)  SpO2: 100% (12-09-20 @ 05:10) (99% - 100%)  Wt(kg): --  I&O's Summary      LABS:                        7.9    7.63  )-----------( 275      ( 09 Dec 2020 07:43 )             27.5     12-09    143  |  104  |  13  ----------------------------<  87  3.7   |  32<H>  |  0.57    Ca    8.4      09 Dec 2020 07:43  Phos  2.8     12-09  Mg     1.8     12-09    TPro  6.7  /  Alb  1.9<L>  /  TBili  1.3<H>  /  DBili  x   /  AST  45<H>  /  ALT  36  /  AlkPhos  257<H>  12-09        CAPILLARY BLOOD GLUCOSE      POCT Blood Glucose.: 93 mg/dL (09 Dec 2020 05:54)  POCT Blood Glucose.: 127 mg/dL (08 Dec 2020 23:49)  POCT Blood Glucose.: 103 mg/dL (08 Dec 2020 12:22)              MEDICATIONS  (STANDING):  ascorbic acid 500 milliGRAM(s) Oral two times a day  chlorhexidine 2% Cloths 1 Application(s) Topical daily  DAPTOmycin IVPB 350 milliGRAM(s) IV Intermittent every 24 hours  dextrose 5%. 1000 milliLiter(s) (40 mL/Hr) IV Continuous <Continuous>  enoxaparin Injectable 50 milliGRAM(s) SubCutaneous daily  fluconAZOLE IVPB 200 milliGRAM(s) IV Intermittent every 24 hours  fluconAZOLE IVPB      furosemide    Tablet 20 milliGRAM(s) Oral daily  lactobacillus acidophilus 1 Tablet(s) Oral daily  multivitamin/minerals 1 Tablet(s) Oral daily  nystatin Powder 1 Application(s) Topical two times a day  pantoprazole   Suspension 40 milliGRAM(s) Enteral Tube daily  rifAXIMin 550 milliGRAM(s) Oral two times a day  zinc sulfate 220 milliGRAM(s) Oral daily    MEDICATIONS  (PRN):  acetaminophen   Tablet .. 650 milliGRAM(s) Oral every 6 hours PRN Temp greater or equal to 38C (100.4F), Mild Pain (1 - 3), Moderate Pain (4 - 6)  ALBUTerol    90 MICROgram(s) HFA Inhaler 2 Puff(s) Inhalation every 6 hours PRN Shortness of Breath and/or Wheezing  sodium chloride 0.9% lock flush 10 milliLiter(s) IV Push every 1 hour PRN Pre/post blood products, medications, blood draw, and to maintain line patency      RADIOLOGY & ADDITIONAL TESTS:    Imaging Personally Reviewed:  [ ] YES  [ ] NO    REVIEW OF SYSTEMS:  CONSTITUTIONAL: No fever, weight loss, or fatigue  EYES: No eye pain, visual disturbances, or discharge  ENMT:  No difficulty hearing, tinnitus, vertigo; No sinus or throat pain  NECK: No pain or stiffness  BREASTS: No pain, masses, or nipple discharge  RESPIRATORY: No cough, wheezing, chills or hemoptysis; No shortness of breath  CARDIOVASCULAR: No chest pain, palpitations, dizziness, or leg swelling  GASTROINTESTINAL: No abdominal or epigastric pain. No nausea, vomiting, or hematemesis; No diarrhea or constipation. No melena or hematochezia.  GENITOURINARY: No dysuria, frequency, hematuria, or incontinence  NEUROLOGICAL: No headaches, memory loss, loss of strength, numbness, or tremors  SKIN: No itching, burning, rashes, or lesions   LYMPH NODES: No enlarged glands  ENDOCRINE: No heat or cold intolerance; No hair loss  MUSCULOSKELETAL: No joint pain or swelling; No muscle, back, or extremity pain  PSYCHIATRIC: No depression, anxiety, mood swings, or difficulty sleeping  HEME/LYMPH: No easy bruising, or bleeding gums  ALLERY AND IMMUNOLOGIC: No hives or eczema      Consultant(s) Notes Reviewed:  [ x ] YES  [ ] NO    PHYSICAL EXAM:  GENERAL: NAD, well-groomed, well-developed  HEAD:  Atraumatic, Normocephalic  EYES: EOMI, PERRLA, conjunctiva and sclera clear  ENMT: No tonsillar erythema, exudates, or enlargement; Moist mucous membranes, Good dentition, No lesions  NECK: Supple, No JVD, Normal thyroid  NERVOUS SYSTEM:  Alert & Oriented X3, Good concentration; Motor Strength 5/5 B/L upper and lower extremities; DTRs 2+ intact and symmetric  CHEST/LUNG: Clear to percussion bilaterally; No rales, rhonchi, wheezing, or rubs  HEART: Regular rate and rhythm; No murmurs, rubs, or gallops  ABDOMEN: Soft, Nontender, Nondistended; Bowel sounds present  EXTREMITIES:  2+ Peripheral Pulses, No clubbing, cyanosis, or edema  LYMPH: No lymphadenopathy noted  SKIN: No rashes or lesions    Care Discussed with Consultants/Other Providers [ x] YES  [ ] NO

## 2020-12-09 NOTE — PROGRESS NOTE ADULT - SUBJECTIVE AND OBJECTIVE BOX
MARIBETH PADILLA    SCU progress note    INTERVAL HPI/OVERNIGHT EVENTS: ***Temperature 99.7. Procalcitonin elevated 0.68. CXR pending    DNR [x ]   DNI  [  ]  TRIAL OF INTUBATION    Covid - 19 PCR: Negative 11/30    The 4Ms    What Matters Most: see GOC  Age appropriate Medications/Screen for High Risk Medication: Yes  Mentation: see CAM below  Mobility: defer to physical exam    The Confusion Assessment Method (CAM) Diagnostic Algorithm     1: Acute Onset or Fluctuating Course  - Is there evidence of an acute change in mental status from the patient’s baseline? Did the (abnormal) behavior  fluctuate during the day, that is, tend to come and go, or increase and decrease in severity?  [ ] YES [ x] NO     2: Inattention  - Did the patient have difficulty focusing attention, being easily distractible, or having difficulty keeping track of what was being said?  [ ] YES [x ] NO     3: Disorganized thinking  -Was the patient’s thinking disorganized or incoherent, such as rambling or irrelevant conversation, unclear or illogical flow of ideas, or unpredictable switching from subject to subject?  [ ] YES [ x] NO    4: Altered Level of consciousness?  [ ] YES [x ] NO    The diagnosis of delirium by CAM requires the presence of features 1 and 2 and either 3 or 4.    PRESSORS: [ ] YES [ ] NO  DAPTOmycin IVPB 350 milliGRAM(s) IV Intermittent every 24 hours  fluconAZOLE IVPB 200 milliGRAM(s) IV Intermittent every 24 hours  fluconAZOLE IVPB      rifAXIMin 550 milliGRAM(s) Oral two times a day    Cardiovascular:  Heart Failure  Acute   Acute on Chronic  Chronic       furosemide    Tablet 20 milliGRAM(s) Oral daily    Pulmonary:  ALBUTerol    90 MICROgram(s) HFA Inhaler 2 Puff(s) Inhalation every 6 hours PRN    Hematalogic:  enoxaparin Injectable 50 milliGRAM(s) SubCutaneous daily    Other:  acetaminophen   Tablet .. 650 milliGRAM(s) Oral every 6 hours PRN  ascorbic acid 500 milliGRAM(s) Oral two times a day  chlorhexidine 2% Cloths 1 Application(s) Topical daily  dextrose 5%. 1000 milliLiter(s) IV Continuous <Continuous>  lactobacillus acidophilus 1 Tablet(s) Oral daily  multivitamin/minerals 1 Tablet(s) Oral daily  nystatin Powder 1 Application(s) Topical two times a day  pantoprazole   Suspension 40 milliGRAM(s) Enteral Tube daily  sodium chloride 0.9% lock flush 10 milliLiter(s) IV Push every 1 hour PRN  zinc sulfate 220 milliGRAM(s) Oral daily    acetaminophen   Tablet .. 650 milliGRAM(s) Oral every 6 hours PRN  ALBUTerol    90 MICROgram(s) HFA Inhaler 2 Puff(s) Inhalation every 6 hours PRN  ascorbic acid 500 milliGRAM(s) Oral two times a day  chlorhexidine 2% Cloths 1 Application(s) Topical daily  DAPTOmycin IVPB 350 milliGRAM(s) IV Intermittent every 24 hours  dextrose 5%. 1000 milliLiter(s) IV Continuous <Continuous>  enoxaparin Injectable 50 milliGRAM(s) SubCutaneous daily  fluconAZOLE IVPB 200 milliGRAM(s) IV Intermittent every 24 hours  fluconAZOLE IVPB      furosemide    Tablet 20 milliGRAM(s) Oral daily  lactobacillus acidophilus 1 Tablet(s) Oral daily  multivitamin/minerals 1 Tablet(s) Oral daily  nystatin Powder 1 Application(s) Topical two times a day  pantoprazole   Suspension 40 milliGRAM(s) Enteral Tube daily  rifAXIMin 550 milliGRAM(s) Oral two times a day  sodium chloride 0.9% lock flush 10 milliLiter(s) IV Push every 1 hour PRN  zinc sulfate 220 milliGRAM(s) Oral daily    Drug Dosing Weight  Height (cm): 167.6 (21 Oct 2020 02:26)  Weight (kg): 56.2 (21 Oct 2020 02:26)  BMI (kg/m2): 20 (21 Oct 2020 02:26)  BSA (m2): 1.63 (21 Oct 2020 02:26)    CENTRAL LINE: [ ] YES [ ] NO  LOCATION:   DATE INSERTED:  REMOVE: [ ] YES [ ] NO  EXPLAIN:    TREJO: [ ] YES [ ] NO    DATE INSERTED:  REMOVE:  [ ] YES [ ] NO  EXPLAIN:    PAST MEDICAL & SURGICAL HISTORY:  Anasarca    Pulmonary embolism    DVT (deep venous thrombosis)    SBO (small bowel obstruction)    H/O exploratory laparotomy        PHYSICAL EXAM:    GENERAL: NAD, thin  HEAD:  Atraumatic, Normocephalic  EYES: EOMI, PERRLA, conjunctiva and sclera clear  ENMT: No tonsillar erythema, exudates  NECK: Supple, No JVD  NERVOUS SYSTEM:  Alert & Oriented X 1/2. Moving all extremities  CHEST/LUNG:  Diminished breath sounds bilateral bases  HEART: Regular rate and rhythm; No murmurs, rubs, or gallops  ABDOMEN: Soft, Nontender, Nondistended; Bowel sounds present  EXTREMITIES:  2+ Peripheral Pulses, No clubbing, cyanosis, or edema  LYMPH: No lymphadenopathy noted  SKIN: No rashes or lesions      LABS:  CBC Full  -  ( 09 Dec 2020 07:43 )  WBC Count : 7.63 K/uL  RBC Count : 2.79 M/uL  Hemoglobin : 7.9 g/dL  Hematocrit : 27.5 %  Platelet Count - Automated : 275 K/uL  Mean Cell Volume : 98.6 fl  Mean Cell Hemoglobin : 28.3 pg  Mean Cell Hemoglobin Concentration : 28.7 gm/dL  Auto Neutrophil # : 5.38 K/uL  Auto Lymphocyte # : 1.27 K/uL  Auto Monocyte # : 0.54 K/uL  Auto Eosinophil # : 0.37 K/uL  Auto Basophil # : 0.04 K/uL  Auto Neutrophil % : 70.6 %  Auto Lymphocyte % : 16.6 %  Auto Monocyte % : 7.1 %  Auto Eosinophil % : 4.8 %  Auto Basophil % : 0.5 %    12-09    143  |  104  |  13  ----------------------------<  87  3.7   |  32<H>  |  0.57    Ca    8.4      09 Dec 2020 07:43  Phos  2.8     12-09  Mg     1.8     12-09    TPro  6.7  /  Alb  1.9<L>  /  TBili  1.3<H>  /  DBili  x   /  AST  45<H>  /  ALT  36  /  AlkPhos  257<H>  12-09              [  ]  DVT Prophylaxis  [  ]  Nutrition, Brand, Rate         Goal Rate        Abnormal Nutritional Status -  Malnutrition   Cachexia      Morbid Obesity BMI >/=40    RADIOLOGY & ADDITIONAL STUDIES:  ***    Goals of Care Discussion with Family/Proxy/Other   - see note from/family meeting set up for...

## 2020-12-09 NOTE — PROGRESS NOTE ADULT - SUBJECTIVE AND OBJECTIVE BOX
Patient is seen and examined at the bed side, is afebrile today.  The WBC count and creatinine stay normal.      REVIEW OF SYSTEMS: All other review systems are negative      ALLERGIES: No Known Allergies      Vital Signs Last 24 Hrs  T(C): 36.7 (09 Dec 2020 13:00), Max: 37.6 (09 Dec 2020 05:10)  T(F): 98.1 (09 Dec 2020 13:00), Max: 99.7 (09 Dec 2020 05:10)  HR: 97 (09 Dec 2020 13:00) (92 - 100)  BP: 108/66 (09 Dec 2020 13:00) (95/62 - 108/66)  BP(mean): --  RR: 18 (09 Dec 2020 13:00) (16 - 18)  SpO2: 100% (09 Dec 2020 13:00) (99% - 100%)      PHYSICAL EXAM:  GENERAL: Not in distress, on oxygen via NC  CHEST/LUNG: Not using accessory muscles   HEART: s1 and s2 present  ABDOMEN:  Nontender and  Nondistended  EXTREMITIES: B/L UE edematous improved  CNS: Awake and Alert         LABS:                        7.9    7.63  )-----------( 275      ( 09 Dec 2020 07:43 )             27.5                9.9    9.41  )-----------( 269      ( 24 Nov 2020 06:38 )             31.5                           9.4    13.14 )-----------( 149      ( 14 Nov 2020 07:58 )             28.7         12-09    143  |  104  |  13  ----------------------------<  87  3.7   |  32<H>  |  0.57    Ca    8.4      09 Dec 2020 07:43  Phos  2.8     12-09  Mg     1.8     12-09    TPro  6.7  /  Alb  1.9<L>  /  TBili  1.3<H>  /  DBili  x   /  AST  45<H>  /  ALT  36  /  AlkPhos  257<H>  12-09 12-08    144  |  106  |  11  ----------------------------<  106<H>  4.1   |  34<H>  |  0.58    Ca    8.4      08 Dec 2020 06:45  Phos  4.0     12-08  Mg     1.7     12-08    TPro  6.7  /  Alb  1.8<L>  /  TBili  1.4<H>  /  DBili  x   /  AST  52<H>  /  ALT  38  /  AlkPhos  261<H>  12-08      11-06    138  |  104  |  37<H>  ----------------------------<  81  3.9   |  25  |  2.37<H>    Ca    8.1<L>      06 Nov 2020 06:20  Phos  3.4     11-06  Mg     2.0     11-06    TPro  5.1<L>  /  Alb  2.8<L>  /  TBili  9.2<H>  /  DBili  x   /  AST  233<H>  /  ALT  99<H>  /  AlkPhos  96  11-06      Vancomycin Level, Trough (11.07.20 @ 21:49)   Vancomycin Level, Trough: 16.1:     Vancomycin Level, Trough (11.07.20 @ 07:08) : 19.5  Vancomycin Level, Trough (11.06.20 @ 06:20) : 19.9:   Procalcitonin, Serum (11.28.20 @ 15:38) : 1.55:       MEDICATIONS  (STANDING):    ascorbic acid 500 milliGRAM(s) Oral two times a day  chlorhexidine 2% Cloths 1 Application(s) Topical daily  DAPTOmycin IVPB 350 milliGRAM(s) IV Intermittent every 24 hours  dextrose 5%. 1000 milliLiter(s) (40 mL/Hr) IV Continuous <Continuous>  enoxaparin Injectable 50 milliGRAM(s) SubCutaneous daily  fluconAZOLE IVPB 200 milliGRAM(s) IV Intermittent every 24 hours  fluconAZOLE IVPB      furosemide    Tablet 20 milliGRAM(s) Oral daily  lactobacillus acidophilus 1 Tablet(s) Oral daily  multivitamin/minerals 1 Tablet(s) Oral daily  nystatin Powder 1 Application(s) Topical two times a day  pantoprazole   Suspension 40 milliGRAM(s) Enteral Tube daily  rifAXIMin 550 milliGRAM(s) Oral two times a day  zinc sulfate 220 milliGRAM(s) Oral daily          RADIOLOGY & ADDITIONAL TESTS:      11/27/20 : Xray Chest 1 View- PORTABLE-Urgent (Xray Chest 1 View- PORTABLE-Urgent .) (11.27.20 @ 06:53) Increased interstitial lung markings with bilateral consolidations right greater than left. Small right pleural effusion. Overall worsening compared to prior exam.      11/24/20 : MR Pelvis Bony Only No Cont (11.24.20 @ 18:02) : Osteomyelitis of the lower sacrum and coccyx with overlying cutaneous ulceration,      11/19/20 : Xray Chest 1 View- PORTABLE-Urgent (Xray Chest 1 View- PORTABLE-Urgent .) (11.19.20 @ 23:53): Improvement in bilateral airspace disease with persistent bibasilar consolidation and small effusions.    11/4/20 : Xray Chest 1 View- PORTABLE-Routine (Xray Chest 1 View- PORTABLE-Routine in AM.) (11.04.20 @ 10:59) Reexpansion of the left lung with residual left pleural effusion and/or infiltrate.  Right pulmonary edema unchanged.  Tubes and catheters in satisfactory position.      10/25/20: Xray Chest 1 View- PORTABLE-Routine (Xray Chest 1 View- PORTABLE-Routine in AM.) (10.25.20 @ 09:21) Frontal expiratory view of the chest shows the heart to be similar in size. Endotracheal tube, right jugular line and feeding tube remain present. The lungs show similar left upper lobe infiltrate with progression of right perihilar infiltrate. Pleural effusions are similar. There is no evidence of pneumothorax.    10/21/20 : CT Abdomen and Pelvis w/ Oral Cont and w/ IV Cont (10.21.20 @ 15:59) Mural thickening of the left colon and rectum. Liquid stool in the colon. Apparent segmental mural thickening of the mid to distal small bowel and the proximal duodenum. Findings may represent nonspecific enterocolitis, noninfectious inflammatory bowel disease, or ischemic bowel. Clinical correlation is recommended. The celiac axis artery, SMA, and KINA are patent without stenosis.    Dilatation of the mid small bowel is again noted. Oral contrast has reached the terminal ileum. Findings may represent small bowel obstruction, or ileus related to nonspecific enterocolitis.   Cholelithiasis. Moderate to large ascites in the abdomen, increased since the previous examination. 5 mm nonspecific noncalcified left upper lobe lung nodule; if the patient's is in the high risk category (i.e. smoker), follow-up chest CT may be pursued in 12 months to ensure stability. Combination of atelectasis and consolidation in the left lower lobe.  Possible 1.0 cm hypodense lesion in the left lobe of the thyroid. Thyroid ultrasound may be pursued for further evaluation.   Mild bilateral pleural effusions.  Aging determinate compression fracture at T5 vertebra.        MICROBIOLOGY DATA:    MRSA/MSSA PCR (12.01.20 @ 01:16)   MRSA PCR Result.: Detected:    MRSA/MSSA PCR (11.28.20 @ 11:34)   MRSA PCR Result.: Detected:     Urine Microscopic-Add On (NC) (11.20.20 @ 04:12)   Red Blood Cell - Urine: 5-10 /HPF   White Blood Cell - Urine: 11-25 /HPF   Calcium Oxalate Crystals: Few /HPF   Bacteria: Moderate /HPF   Comment - Urine: few amorphous urates   Epithelial Cells: Few /HPF     Culture - Blood (11.19.20 @ 10:19)   Specimen Source: .Blood Blood-Peripheral   Culture Results: No growth to date.     Culture - Surgical Swab (11.12.20 @ 05:01)   - Ampicillin: R >8 Predicts results to ampicillin/sulbactam, amoxacillin-clavulanate and piperacillin-tazobactam.   - Levofloxacin: R >4   - Linezolid: S 1   - Tetra/Doxy: R >8   - Vancomycin: R >16   Specimen Source: .Surgical Swab Sacral decub   Culture Results:   Few Enterococcus faecium (vancomycin resistant)   Rare Candida albicans "Susceptibilities not performed"   Organism Identification: Enterococcus faecium (vancomycin resistant)   Organism: Enterococcus faecium (vancomycin resistant)   Method Type: STEWART     Culture - Surgical Swab (11.12.20 @ 05:01)   Specimen Source: .Surgical Swab Sacral decub   Culture Results:  Few Enterococcus faecium Susceptibility to follow.   Rare Candida albicans "Susceptibilities not performed"     Culture - Sputum . (10.26.20 @ 00:26)   - Ampicillin/Sulbactam: R <=8/4   - Cefazolin: R >16   - Ceftazidime: I 16   - Clindamycin: R >4   - Erythromycin: R >4   - Gentamicin: S <=1 Should not be used as monotherapy   - Levofloxacin: S <=0.5   - Linezolid: S 2   - Oxacillin: R >2   - Penicillin: R >8   - RIF- Rifampin: S <=1 Should not be used as monotherapy   - Tetra/Doxy: S <=1   - Trimethoprim/Sulfamethoxazole: S <=0.5/9.5   - Trimethoprim/Sulfamethoxazole: S <=0.5/9.5   - Vancomycin: S 1     MRSA/MSSA PCR (10.21.20 @ 09:41)   MRSA PCR Result.: Detected:       Culture - Blood (10.20.20 @ 22:09)   Specimen Source: .Blood Blood   Culture Results:  No growth to date.     Culture - Blood (10.20.20 @ 22:09)   Specimen Source: .Blood Blood   Culture Results:   No growth to date.     Culture - Blood in AM (10.16.20 @ 10:13)   Specimen Source: .Blood Blood-Peripheral   Culture Results: No growth to date.     Culture - Blood in AM (10.16.20 @ 10:13)   Specimen Source: .Blood Blood-Peripheral   Culture Results: No growth to date.     Culture - Blood (10.13.20 @ 22:23)   Growth in anaerobic bottle: Gram Negative Rods   Specimen Source: .Blood Blood-Peripheral   Organism: Blood Culture PCR   Culture Results: Growth in anaerobic bottle: Bacteroides fragilis   "Susceptibilities not performed"     Culture - Blood (10.13.20 @ 22:23)   Specimen Source: .Blood Blood-Peripheral   Culture Results:   No growth to date.            Patient is seen and examined at the bed side, is afebrile today.  The CXR as of 12/9 shows persistent pleural effusion, L >R.      REVIEW OF SYSTEMS: All other review systems are negative      ALLERGIES: No Known Allergies      Vital Signs Last 24 Hrs  T(C): 36.7 (09 Dec 2020 13:00), Max: 37.6 (09 Dec 2020 05:10)  T(F): 98.1 (09 Dec 2020 13:00), Max: 99.7 (09 Dec 2020 05:10)  HR: 97 (09 Dec 2020 13:00) (92 - 100)  BP: 108/66 (09 Dec 2020 13:00) (95/62 - 108/66)  BP(mean): --  RR: 18 (09 Dec 2020 13:00) (16 - 18)  SpO2: 100% (09 Dec 2020 13:00) (99% - 100%)      PHYSICAL EXAM:  GENERAL: Not in distress, on oxygen via NC  CHEST/LUNG: Not using accessory muscles   HEART: s1 and s2 present  ABDOMEN:  Nontender and  Nondistended  EXTREMITIES: B/L UE edematous improved  CNS: Awake and Alert         LABS:                        7.9    7.63  )-----------( 275      ( 09 Dec 2020 07:43 )             27.5                9.9    9.41  )-----------( 269      ( 24 Nov 2020 06:38 )             31.5                           9.4    13.14 )-----------( 149      ( 14 Nov 2020 07:58 )             28.7         12-09    143  |  104  |  13  ----------------------------<  87  3.7   |  32<H>  |  0.57    Ca    8.4      09 Dec 2020 07:43  Phos  2.8     12-09  Mg     1.8     12-09    TPro  6.7  /  Alb  1.9<L>  /  TBili  1.3<H>  /  DBili  x   /  AST  45<H>  /  ALT  36  /  AlkPhos  257<H>  12-09 11-06    138  |  104  |  37<H>  ----------------------------<  81  3.9   |  25  |  2.37<H>    Ca    8.1<L>      06 Nov 2020 06:20  Phos  3.4     11-06  Mg     2.0     11-06    TPro  5.1<L>  /  Alb  2.8<L>  /  TBili  9.2<H>  /  DBili  x   /  AST  233<H>  /  ALT  99<H>  /  AlkPhos  96  11-06      Vancomycin Level, Trough (11.07.20 @ 21:49)   Vancomycin Level, Trough: 16.1:     Vancomycin Level, Trough (11.07.20 @ 07:08) : 19.5  Vancomycin Level, Trough (11.06.20 @ 06:20) : 19.9:   Procalcitonin, Serum (11.28.20 @ 15:38) : 1.55:       MEDICATIONS  (STANDING):    ascorbic acid 500 milliGRAM(s) Oral two times a day  chlorhexidine 2% Cloths 1 Application(s) Topical daily  DAPTOmycin IVPB 350 milliGRAM(s) IV Intermittent every 24 hours  dextrose 5%. 1000 milliLiter(s) (40 mL/Hr) IV Continuous <Continuous>  enoxaparin Injectable 50 milliGRAM(s) SubCutaneous daily  fluconAZOLE IVPB 200 milliGRAM(s) IV Intermittent every 24 hours  fluconAZOLE IVPB      furosemide    Tablet 20 milliGRAM(s) Oral daily  lactobacillus acidophilus 1 Tablet(s) Oral daily  multivitamin/minerals 1 Tablet(s) Oral daily  nystatin Powder 1 Application(s) Topical two times a day  pantoprazole   Suspension 40 milliGRAM(s) Enteral Tube daily  rifAXIMin 550 milliGRAM(s) Oral two times a day  zinc sulfate 220 milliGRAM(s) Oral daily        RADIOLOGY & ADDITIONAL TESTS:    12/9/20 : Xray Chest 1 View- PORTABLE-Routine (Xray Chest 1 View- PORTABLE-Routine in AM.) (12.09.20 @ 11:54) >  IMPRESSION: Persistent effusions left greater than right.      11/27/20 : Xray Chest 1 View- PORTABLE-Urgent (Xray Chest 1 View- PORTABLE-Urgent .) (11.27.20 @ 06:53) Increased interstitial lung markings with bilateral consolidations right greater than left. Small right pleural effusion. Overall worsening compared to prior exam.      11/24/20 : MR Pelvis Bony Only No Cont (11.24.20 @ 18:02) : Osteomyelitis of the lower sacrum and coccyx with overlying cutaneous ulceration,      11/19/20 : Xray Chest 1 View- PORTABLE-Urgent (Xray Chest 1 View- PORTABLE-Urgent .) (11.19.20 @ 23:53): Improvement in bilateral airspace disease with persistent bibasilar consolidation and small effusions.    11/4/20 : Xray Chest 1 View- PORTABLE-Routine (Xray Chest 1 View- PORTABLE-Routine in AM.) (11.04.20 @ 10:59) Reexpansion of the left lung with residual left pleural effusion and/or infiltrate.  Right pulmonary edema unchanged.  Tubes and catheters in satisfactory position.      10/25/20: Xray Chest 1 View- PORTABLE-Routine (Xray Chest 1 View- PORTABLE-Routine in AM.) (10.25.20 @ 09:21) Frontal expiratory view of the chest shows the heart to be similar in size. Endotracheal tube, right jugular line and feeding tube remain present. The lungs show similar left upper lobe infiltrate with progression of right perihilar infiltrate. Pleural effusions are similar. There is no evidence of pneumothorax.    10/21/20 : CT Abdomen and Pelvis w/ Oral Cont and w/ IV Cont (10.21.20 @ 15:59) Mural thickening of the left colon and rectum. Liquid stool in the colon. Apparent segmental mural thickening of the mid to distal small bowel and the proximal duodenum. Findings may represent nonspecific enterocolitis, noninfectious inflammatory bowel disease, or ischemic bowel. Clinical correlation is recommended. The celiac axis artery, SMA, and KINA are patent without stenosis.    Dilatation of the mid small bowel is again noted. Oral contrast has reached the terminal ileum. Findings may represent small bowel obstruction, or ileus related to nonspecific enterocolitis.   Cholelithiasis. Moderate to large ascites in the abdomen, increased since the previous examination. 5 mm nonspecific noncalcified left upper lobe lung nodule; if the patient's is in the high risk category (i.e. smoker), follow-up chest CT may be pursued in 12 months to ensure stability. Combination of atelectasis and consolidation in the left lower lobe.  Possible 1.0 cm hypodense lesion in the left lobe of the thyroid. Thyroid ultrasound may be pursued for further evaluation.   Mild bilateral pleural effusions.  Aging determinate compression fracture at T5 vertebra.        MICROBIOLOGY DATA:  MRSA/MSSA PCR (12.01.20 @ 01:16)   MRSA PCR Result.: Detected:    MRSA/MSSA PCR (11.28.20 @ 11:34)   MRSA PCR Result.: Detected:     Urine Microscopic-Add On (NC) (11.20.20 @ 04:12)   Red Blood Cell - Urine: 5-10 /HPF   White Blood Cell - Urine: 11-25 /HPF   Calcium Oxalate Crystals: Few /HPF   Bacteria: Moderate /HPF   Comment - Urine: few amorphous urates   Epithelial Cells: Few /HPF     Culture - Blood (11.19.20 @ 10:19)   Specimen Source: .Blood Blood-Peripheral   Culture Results: No growth to date.     Culture - Surgical Swab (11.12.20 @ 05:01)   - Ampicillin: R >8 Predicts results to ampicillin/sulbactam, amoxacillin-clavulanate and piperacillin-tazobactam.   - Levofloxacin: R >4   - Linezolid: S 1   - Tetra/Doxy: R >8   - Vancomycin: R >16   Specimen Source: .Surgical Swab Sacral decub   Culture Results:   Few Enterococcus faecium (vancomycin resistant)   Rare Candida albicans "Susceptibilities not performed"   Organism Identification: Enterococcus faecium (vancomycin resistant)   Organism: Enterococcus faecium (vancomycin resistant)   Method Type: STEWART     Culture - Surgical Swab (11.12.20 @ 05:01)   Specimen Source: .Surgical Swab Sacral decub   Culture Results:  Few Enterococcus faecium Susceptibility to follow.   Rare Candida albicans "Susceptibilities not performed"     Culture - Sputum . (10.26.20 @ 00:26)   - Ampicillin/Sulbactam: R <=8/4   - Cefazolin: R >16   - Ceftazidime: I 16   - Clindamycin: R >4   - Erythromycin: R >4   - Gentamicin: S <=1 Should not be used as monotherapy   - Levofloxacin: S <=0.5   - Linezolid: S 2   - Oxacillin: R >2   - Penicillin: R >8   - RIF- Rifampin: S <=1 Should not be used as monotherapy   - Tetra/Doxy: S <=1   - Trimethoprim/Sulfamethoxazole: S <=0.5/9.5   - Trimethoprim/Sulfamethoxazole: S <=0.5/9.5   - Vancomycin: S 1     MRSA/MSSA PCR (10.21.20 @ 09:41)   MRSA PCR Result.: Detected:       Culture - Blood (10.20.20 @ 22:09)   Specimen Source: .Blood Blood   Culture Results:  No growth to date.     Culture - Blood (10.20.20 @ 22:09)   Specimen Source: .Blood Blood   Culture Results:   No growth to date.     Culture - Blood in AM (10.16.20 @ 10:13)   Specimen Source: .Blood Blood-Peripheral   Culture Results: No growth to date.     Culture - Blood in AM (10.16.20 @ 10:13)   Specimen Source: .Blood Blood-Peripheral   Culture Results: No growth to date.     Culture - Blood (10.13.20 @ 22:23)   Growth in anaerobic bottle: Gram Negative Rods   Specimen Source: .Blood Blood-Peripheral   Organism: Blood Culture PCR   Culture Results: Growth in anaerobic bottle: Bacteroides fragilis   "Susceptibilities not performed"     Culture - Blood (10.13.20 @ 22:23)   Specimen Source: .Blood Blood-Peripheral   Culture Results:   No growth to date.

## 2020-12-09 NOTE — PROGRESS NOTE ADULT - PROBLEM SELECTOR PLAN 8
Needs Daptomycin until 12/28  Facilities will not give Dapto  Can switch to linezolid on 12/14-12/28.

## 2020-12-10 NOTE — CHART NOTE - NSCHARTNOTEFT_GEN_A_CORE
Assessment:   65yFemalePatient is a 65y old  Female who presents with a chief complaint of Hypotension (10 Dec 2020 10:53). Pt visited. Pt is on O2. Pt is fed by staff. Po intake ~ 25 % of meal. Observed pt drank TWO papi HN ~ 50 % of meal. S/ P Pressure ulcer Debridement of  pressure ulcer @ Coccyx. .       Factors impacting intake: [ ] none [ ] nausea  [ ] vomiting [ ] diarrhea [ ] constipation  [ ]chewing problems [ ] swallowing issues  [ ] other:     Diet Prescription : Diet, Dysphagia 1 Pureed-Thin Liquids:   Low Sodium  Supplement Feeding Modality:  Oral  Two Papi HN Cans or Servings Per Day:  2       Frequency:  Two Times a day  Ensure Pudding Cans or Servings Per Day:  2       Frequency:  Two Times a day (12-02-20 @ 11:57)    Intake: ~25 % of meal.     Current Weight:   % Weight Change bed scale 110 lb with out scd device    Pertinent Medications: MEDICATIONS  (STANDING):  ascorbic acid 500 milliGRAM(s) Oral two times a day  chlorhexidine 2% Cloths 1 Application(s) Topical daily  DAPTOmycin IVPB 350 milliGRAM(s) IV Intermittent every 24 hours  dextrose 5%. 1000 milliLiter(s) (40 mL/Hr) IV Continuous <Continuous>  enoxaparin Injectable 50 milliGRAM(s) SubCutaneous daily  fluconAZOLE IVPB 200 milliGRAM(s) IV Intermittent every 24 hours  fluconAZOLE IVPB      furosemide    Tablet 20 milliGRAM(s) Oral daily  lactobacillus acidophilus 1 Tablet(s) Oral daily  multivitamin/minerals 1 Tablet(s) Oral daily  mupirocin 2% Ointment 1 Application(s) Topical every 12 hours  nystatin Powder 1 Application(s) Topical two times a day  pantoprazole   Suspension 40 milliGRAM(s) Enteral Tube daily  rifAXIMin 550 milliGRAM(s) Oral two times a day  zinc sulfate 220 milliGRAM(s) Oral daily    MEDICATIONS  (PRN):  acetaminophen   Tablet .. 650 milliGRAM(s) Oral every 6 hours PRN Temp greater or equal to 38C (100.4F), Mild Pain (1 - 3), Moderate Pain (4 - 6)  ALBUTerol    90 MICROgram(s) HFA Inhaler 2 Puff(s) Inhalation every 6 hours PRN Shortness of Breath and/or Wheezing  sodium chloride 0.9% lock flush 10 milliLiter(s) IV Push every 1 hour PRN Pre/post blood products, medications, blood draw, and to maintain line patency    Pertinent Labs: 12-10 Na144 mmol/L Glu 70 mg/dL K+ 3.8 mmol/L Cr  0.54 mg/dL BUN 12 mg/dL 12-10 Phos 3.2 mg/dL 12-10 Alb 1.8 g/dL<L>     CAPILLARY BLOOD GLUCOSE      POCT Blood Glucose.: 101 mg/dL (10 Dec 2020 11:53)  POCT Blood Glucose.: 84 mg/dL (10 Dec 2020 05:14)  POCT Blood Glucose.: 96 mg/dL (09 Dec 2020 23:36)    Skin: DTI @ coccyx.     Estimated Needs:   [ ] no change since previous assessment  [ ] recalculated:     Previous Nutrition Diagnosis:   [ ] Inadequate Energy Intake [ ]Inadequate Oral Intake [ ] Excessive Energy Intake   [ ] Underweight [ ] Increased Nutrient Needs [ ] Overweight/Obesity   [ ] Altered GI Function [ ] Unintended Weight Loss [ ] Food & Nutrition Related Knowledge Deficit [x ] Malnutrition  severe    Nutrition Diagnosis is [x ] ongoing  [ ] resolved [ ] not applicable     New Nutrition Diagnosis: [ ] not applicable       Interventions:   Recommend  [ ] Change Diet To:  [x ] Nutrition Supplement Two papi HN BID and Ensure pudding BID.   [ ] Nutrition Support  [ ] Other:     Monitoring and Evaluation:   [ ] PO intake [ x ] Tolerance to diet prescription [ x ] weights [ x ] labs[ x ] follow up per protocol  [ ] other:

## 2020-12-10 NOTE — PROGRESS NOTE ADULT - SUBJECTIVE AND OBJECTIVE BOX
Patient is seen and examined at the bed side, remains afebrile.  No new overnight events,       REVIEW OF SYSTEMS: All other review systems are negative      ALLERGIES: No Known Allergies      Vital Signs Last 24 Hrs  T(C): 36.6 (10 Dec 2020 05:02), Max: 36.9 (09 Dec 2020 20:33)  T(F): 97.9 (10 Dec 2020 05:02), Max: 98.5 (09 Dec 2020 20:33)  HR: 96 (10 Dec 2020 05:02) (96 - 99)  BP: 115/80 (10 Dec 2020 05:02) (102/58 - 115/80)  BP(mean): --  RR: 19 (10 Dec 2020 05:02) (17 - 19)  SpO2: 100% (10 Dec 2020 05:02) (100% - 100%)      PHYSICAL EXAM:  GENERAL: Not in distress, on oxygen via NC  CHEST/LUNG: Not using accessory muscles   HEART: s1 and s2 present  ABDOMEN:  Nontender and  Nondistended  EXTREMITIES: B/L UE edematous improved  CNS: Awake and Alert         LABS:                        7.8    7.51  )-----------( 289      ( 10 Dec 2020 07:51 )             27.0                9.9    9.41  )-----------( 269      ( 24 Nov 2020 06:38 )             31.5                           9.4    13.14 )-----------( 149      ( 14 Nov 2020 07:58 )             28.7       12-10    144  |  107  |  12  ----------------------------<  70  3.8   |  34<H>  |  0.54    Ca    8.3<L>      10 Dec 2020 07:51  Phos  3.2     12-10  Mg     2.0     12-10    TPro  6.3  /  Alb  1.8<L>  /  TBili  1.3<H>  /  DBili  x   /  AST  45<H>  /  ALT  33  /  AlkPhos  283<H>  12-10        11-06    138  |  104  |  37<H>  ----------------------------<  81  3.9   |  25  |  2.37<H>    Ca    8.1<L>      06 Nov 2020 06:20  Phos  3.4     11-06  Mg     2.0     11-06    TPro  5.1<L>  /  Alb  2.8<L>  /  TBili  9.2<H>  /  DBili  x   /  AST  233<H>  /  ALT  99<H>  /  AlkPhos  96  11-06      Vancomycin Level, Trough (11.07.20 @ 21:49)   Vancomycin Level, Trough: 16.1:     Vancomycin Level, Trough (11.07.20 @ 07:08) : 19.5  Vancomycin Level, Trough (11.06.20 @ 06:20) : 19.9:   Procalcitonin, Serum (11.28.20 @ 15:38) : 1.55:       MEDICATIONS  (STANDING):    ascorbic acid 500 milliGRAM(s) Oral two times a day  chlorhexidine 2% Cloths 1 Application(s) Topical daily  DAPTOmycin IVPB 350 milliGRAM(s) IV Intermittent every 24 hours  dextrose 5%. 1000 milliLiter(s) (40 mL/Hr) IV Continuous <Continuous>  enoxaparin Injectable 50 milliGRAM(s) SubCutaneous daily  fluconAZOLE IVPB 200 milliGRAM(s) IV Intermittent every 24 hours  fluconAZOLE IVPB      furosemide    Tablet 20 milliGRAM(s) Oral daily  lactobacillus acidophilus 1 Tablet(s) Oral daily  multivitamin/minerals 1 Tablet(s) Oral daily  nystatin Powder 1 Application(s) Topical two times a day  pantoprazole   Suspension 40 milliGRAM(s) Enteral Tube daily  rifAXIMin 550 milliGRAM(s) Oral two times a day  zinc sulfate 220 milliGRAM(s) Oral daily      RADIOLOGY & ADDITIONAL TESTS:    12/9/20 : Xray Chest 1 View- PORTABLE-Routine (Xray Chest 1 View- PORTABLE-Routine in AM.) (12.09.20 @ 11:54) >  IMPRESSION: Persistent effusions left greater than right.      11/27/20 : Xray Chest 1 View- PORTABLE-Urgent (Xray Chest 1 View- PORTABLE-Urgent .) (11.27.20 @ 06:53) Increased interstitial lung markings with bilateral consolidations right greater than left. Small right pleural effusion. Overall worsening compared to prior exam.      11/24/20 : MR Pelvis Bony Only No Cont (11.24.20 @ 18:02) : Osteomyelitis of the lower sacrum and coccyx with overlying cutaneous ulceration,      11/19/20 : Xray Chest 1 View- PORTABLE-Urgent (Xray Chest 1 View- PORTABLE-Urgent .) (11.19.20 @ 23:53): Improvement in bilateral airspace disease with persistent bibasilar consolidation and small effusions.    11/4/20 : Xray Chest 1 View- PORTABLE-Routine (Xray Chest 1 View- PORTABLE-Routine in AM.) (11.04.20 @ 10:59) Reexpansion of the left lung with residual left pleural effusion and/or infiltrate.  Right pulmonary edema unchanged.  Tubes and catheters in satisfactory position.      10/25/20: Xray Chest 1 View- PORTABLE-Routine (Xray Chest 1 View- PORTABLE-Routine in AM.) (10.25.20 @ 09:21) Frontal expiratory view of the chest shows the heart to be similar in size. Endotracheal tube, right jugular line and feeding tube remain present. The lungs show similar left upper lobe infiltrate with progression of right perihilar infiltrate. Pleural effusions are similar. There is no evidence of pneumothorax.    10/21/20 : CT Abdomen and Pelvis w/ Oral Cont and w/ IV Cont (10.21.20 @ 15:59) Mural thickening of the left colon and rectum. Liquid stool in the colon. Apparent segmental mural thickening of the mid to distal small bowel and the proximal duodenum. Findings may represent nonspecific enterocolitis, noninfectious inflammatory bowel disease, or ischemic bowel. Clinical correlation is recommended. The celiac axis artery, SMA, and KINA are patent without stenosis.    Dilatation of the mid small bowel is again noted. Oral contrast has reached the terminal ileum. Findings may represent small bowel obstruction, or ileus related to nonspecific enterocolitis.   Cholelithiasis. Moderate to large ascites in the abdomen, increased since the previous examination. 5 mm nonspecific noncalcified left upper lobe lung nodule; if the patient's is in the high risk category (i.e. smoker), follow-up chest CT may be pursued in 12 months to ensure stability. Combination of atelectasis and consolidation in the left lower lobe.  Possible 1.0 cm hypodense lesion in the left lobe of the thyroid. Thyroid ultrasound may be pursued for further evaluation.   Mild bilateral pleural effusions.  Aging determinate compression fracture at T5 vertebra.        MICROBIOLOGY DATA:  MRSA/MSSA PCR (12.01.20 @ 01:16)   MRSA PCR Result.: Detected:    MRSA/MSSA PCR (11.28.20 @ 11:34)   MRSA PCR Result.: Detected:     Urine Microscopic-Add On (NC) (11.20.20 @ 04:12)   Red Blood Cell - Urine: 5-10 /HPF   White Blood Cell - Urine: 11-25 /HPF   Calcium Oxalate Crystals: Few /HPF   Bacteria: Moderate /HPF   Comment - Urine: few amorphous urates   Epithelial Cells: Few /HPF     Culture - Blood (11.19.20 @ 10:19)   Specimen Source: .Blood Blood-Peripheral   Culture Results: No growth to date.     Culture - Surgical Swab (11.12.20 @ 05:01)   - Ampicillin: R >8 Predicts results to ampicillin/sulbactam, amoxacillin-clavulanate and piperacillin-tazobactam.   - Levofloxacin: R >4   - Linezolid: S 1   - Tetra/Doxy: R >8   - Vancomycin: R >16   Specimen Source: .Surgical Swab Sacral decub   Culture Results:   Few Enterococcus faecium (vancomycin resistant)   Rare Candida albicans "Susceptibilities not performed"   Organism Identification: Enterococcus faecium (vancomycin resistant)   Organism: Enterococcus faecium (vancomycin resistant)   Method Type: STEWART     Culture - Surgical Swab (11.12.20 @ 05:01)   Specimen Source: .Surgical Swab Sacral decub   Culture Results:  Few Enterococcus faecium Susceptibility to follow.   Rare Candida albicans "Susceptibilities not performed"     Culture - Sputum . (10.26.20 @ 00:26)   - Ampicillin/Sulbactam: R <=8/4   - Cefazolin: R >16   - Ceftazidime: I 16   - Clindamycin: R >4   - Erythromycin: R >4   - Gentamicin: S <=1 Should not be used as monotherapy   - Levofloxacin: S <=0.5   - Linezolid: S 2   - Oxacillin: R >2   - Penicillin: R >8   - RIF- Rifampin: S <=1 Should not be used as monotherapy   - Tetra/Doxy: S <=1   - Trimethoprim/Sulfamethoxazole: S <=0.5/9.5   - Trimethoprim/Sulfamethoxazole: S <=0.5/9.5   - Vancomycin: S 1     MRSA/MSSA PCR (10.21.20 @ 09:41)   MRSA PCR Result.: Detected:       Culture - Blood (10.20.20 @ 22:09)   Specimen Source: .Blood Blood   Culture Results:  No growth to date.     Culture - Blood (10.20.20 @ 22:09)   Specimen Source: .Blood Blood   Culture Results:   No growth to date.     Culture - Blood in AM (10.16.20 @ 10:13)   Specimen Source: .Blood Blood-Peripheral   Culture Results: No growth to date.     Culture - Blood in AM (10.16.20 @ 10:13)   Specimen Source: .Blood Blood-Peripheral   Culture Results: No growth to date.     Culture - Blood (10.13.20 @ 22:23)   Growth in anaerobic bottle: Gram Negative Rods   Specimen Source: .Blood Blood-Peripheral   Organism: Blood Culture PCR   Culture Results: Growth in anaerobic bottle: Bacteroides fragilis   "Susceptibilities not performed"     Culture - Blood (10.13.20 @ 22:23)   Specimen Source: .Blood Blood-Peripheral   Culture Results:   No growth to date.

## 2020-12-10 NOTE — PROGRESS NOTE ADULT - NUTRITIONAL ASSESSMENT
This patient has been assessed with a concern for Malnutrition and has been determined to have a diagnosis/diagnoses of Severe protein-calorie malnutrition.    This patient is being managed with:   Diet Dysphagia 1 Pureed-Thin Liquids-  Low Sodium  Supplement Feeding Modality:  Oral  Two Papi HN Cans or Servings Per Day:  2       Frequency:  Two Times a day  Ensure Pudding Cans or Servings Per Day:  2       Frequency:  Two Times a day  Entered: Dec  2 2020 11:57AM    REHAB PLACEMENT

## 2020-12-10 NOTE — PROGRESS NOTE ADULT - SUBJECTIVE AND OBJECTIVE BOX
MARIBETH PADILLA    SCU progress note    INTERVAL HPI/OVERNIGHT EVENTS: ***    DNR [x ]   DNI  [  ]   TRIAL OF INTUBATION    Covid - 19 PCR: Negative 11/30    The 4Ms    What Matters Most: see GOC  Age appropriate Medications/Screen for High Risk Medication: Yes  Mentation: see CAM below  Mobility: defer to physical exam    The Confusion Assessment Method (CAM) Diagnostic Algorithm     1: Acute Onset or Fluctuating Course  - Is there evidence of an acute change in mental status from the patient’s baseline? Did the (abnormal) behavior  fluctuate during the day, that is, tend to come and go, or increase and decrease in severity?  [ ] YES [x ] NO     2: Inattention  - Did the patient have difficulty focusing attention, being easily distractible, or having difficulty keeping track of what was being said?  [ ] YES [x ] NO     3: Disorganized thinking  -Was the patient’s thinking disorganized or incoherent, such as rambling or irrelevant conversation, unclear or illogical flow of ideas, or unpredictable switching from subject to subject?  [ ] YES [x ] NO    4: Altered Level of consciousness?  [ ] YES [x ] NO    The diagnosis of delirium by CAM requires the presence of features 1 and 2 and either 3 or 4.    PRESSORS: [ ] YES [x ] NO  DAPTOmycin IVPB 350 milliGRAM(s) IV Intermittent every 24 hours  fluconAZOLE IVPB 200 milliGRAM(s) IV Intermittent every 24 hours  fluconAZOLE IVPB      rifAXIMin 550 milliGRAM(s) Oral two times a day    Cardiovascular:  Heart Failure  Acute   Acute on Chronic  Chronic       furosemide    Tablet 20 milliGRAM(s) Oral daily    Pulmonary:  ALBUTerol    90 MICROgram(s) HFA Inhaler 2 Puff(s) Inhalation every 6 hours PRN    Hematalogic:  enoxaparin Injectable 50 milliGRAM(s) SubCutaneous daily    Other:  acetaminophen   Tablet .. 650 milliGRAM(s) Oral every 6 hours PRN  ascorbic acid 500 milliGRAM(s) Oral two times a day  chlorhexidine 2% Cloths 1 Application(s) Topical daily  dextrose 5%. 1000 milliLiter(s) IV Continuous <Continuous>  lactobacillus acidophilus 1 Tablet(s) Oral daily  multivitamin/minerals 1 Tablet(s) Oral daily  nystatin Powder 1 Application(s) Topical two times a day  pantoprazole   Suspension 40 milliGRAM(s) Enteral Tube daily  sodium chloride 0.9% lock flush 10 milliLiter(s) IV Push every 1 hour PRN  zinc sulfate 220 milliGRAM(s) Oral daily    acetaminophen   Tablet .. 650 milliGRAM(s) Oral every 6 hours PRN  ALBUTerol    90 MICROgram(s) HFA Inhaler 2 Puff(s) Inhalation every 6 hours PRN  ascorbic acid 500 milliGRAM(s) Oral two times a day  chlorhexidine 2% Cloths 1 Application(s) Topical daily  DAPTOmycin IVPB 350 milliGRAM(s) IV Intermittent every 24 hours  dextrose 5%. 1000 milliLiter(s) IV Continuous <Continuous>  enoxaparin Injectable 50 milliGRAM(s) SubCutaneous daily  fluconAZOLE IVPB 200 milliGRAM(s) IV Intermittent every 24 hours  fluconAZOLE IVPB      furosemide    Tablet 20 milliGRAM(s) Oral daily  lactobacillus acidophilus 1 Tablet(s) Oral daily  multivitamin/minerals 1 Tablet(s) Oral daily  nystatin Powder 1 Application(s) Topical two times a day  pantoprazole   Suspension 40 milliGRAM(s) Enteral Tube daily  rifAXIMin 550 milliGRAM(s) Oral two times a day  sodium chloride 0.9% lock flush 10 milliLiter(s) IV Push every 1 hour PRN  zinc sulfate 220 milliGRAM(s) Oral daily    Drug Dosing Weight  Height (cm): 167.6 (21 Oct 2020 02:26)  Weight (kg): 56.2 (21 Oct 2020 02:26)  BMI (kg/m2): 20 (21 Oct 2020 02:26)  BSA (m2): 1.63 (21 Oct 2020 02:26)    CENTRAL LINE: [ ] YES [x ] NO  LOCATION:   DATE INSERTED:  REMOVE: [ ] YES [ ] NO  EXPLAIN:    TREJO: [ ] YES [x ] NO    DATE INSERTED:  REMOVE:  [ ] YES [ ] NO  EXPLAIN:    PAST MEDICAL & SURGICAL HISTORY:  Anasarca    Pulmonary embolism    DVT (deep venous thrombosis)    SBO (small bowel obstruction)    H/O exploratory laparotomy        PHYSICAL EXAM:    GENERAL: NAD, thin  HEAD:  Atraumatic, Normocephalic  EYES: EOMI, PERRLA, conjunctiva and sclera clear  ENMT: No tonsillar erythema, exudates  NECK: Supple, No JVD  NERVOUS SYSTEM:  Alert & Oriented X1/2. Moving all extremities. Follows simple commands  CHEST/LUNG: Diminished breath sounds bilateral bases  HEART: Regular rate and rhythm; No murmurs, rubs, or gallops  ABDOMEN: Soft, Nontender, Nondistended; Bowel sounds present  EXTREMITIES:  2+ Peripheral Pulses, No clubbing, cyanosis, or edema  LYMPH: No lymphadenopathy noted  SKIN:  Unstageable sacrum      LABS:  CBC Full  -  ( 10 Dec 2020 07:51 )  WBC Count : 7.51 K/uL  RBC Count : 2.78 M/uL  Hemoglobin : 7.8 g/dL  Hematocrit : 27.0 %  Platelet Count - Automated : 289 K/uL  Mean Cell Volume : 97.1 fl  Mean Cell Hemoglobin : 28.1 pg  Mean Cell Hemoglobin Concentration : 28.9 gm/dL  Auto Neutrophil # : 5.20 K/uL  Auto Lymphocyte # : 1.43 K/uL  Auto Monocyte # : 0.50 K/uL  Auto Eosinophil # : 0.30 K/uL  Auto Basophil # : 0.06 K/uL  Auto Neutrophil % : 69.2 %  Auto Lymphocyte % : 19.0 %  Auto Monocyte % : 6.7 %  Auto Eosinophil % : 4.0 %  Auto Basophil % : 0.8 %    12-10    144  |  107  |  12  ----------------------------<  70  3.8   |  34<H>  |  0.54    Ca    8.3<L>      10 Dec 2020 07:51  Phos  3.2     12-10  Mg     2.0     12-10    TPro  6.3  /  Alb  1.8<L>  /  TBili  1.3<H>  /  DBili  x   /  AST  45<H>  /  ALT  33  /  AlkPhos  283<H>  12-10              [  ]  DVT Prophylaxis  [  ]  Nutrition, Brand, Rate         Goal Rate        Abnormal Nutritional Status -  Malnutrition   Cachexia          RADIOLOGY & ADDITIONAL STUDIES:  ***  < from: Xray Chest 1 View- PORTABLE-Routine (Xray Chest 1 View- PORTABLE-Routine in AM.) (12.09.20 @ 11:54) >  Heart is likely enlarged. Left PICCline remains.    There are sizable effusions left greater than right. Adjacent infiltrate is possible.    Noted is an IVC filter.    Allowing for technique, study is similar to December 8.    IMPRESSION: Persistent effusions left greater than right.    < end of copied text >    Goals of Care Discussion with Family/Proxy/Other   - see note from 11/18   MARIBETH PADILLA    SCU progress note    INTERVAL HPI/OVERNIGHT EVENTS: ***No overnight events    DNR [x ]   DNI  [  ]   TRIAL OF INTUBATION    Covid - 19 PCR: Negative 11/30    The 4Ms    What Matters Most: see [facility]  Age appropriate Medications/Screen for High Risk Medication: Yes  Mentation: see CAM below  Mobility: defer to physical exam    The Confusion Assessment Method (CAM) Diagnostic Algorithm     1: Acute Onset or Fluctuating Course  - Is there evidence of an acute change in mental status from the patient’s baseline? Did the (abnormal) behavior  fluctuate during the day, that is, tend to come and go, or increase and decrease in severity?  [ ] YES [x ] NO     2: Inattention  - Did the patient have difficulty focusing attention, being easily distractible, or having difficulty keeping track of what was being said?  [ ] YES [x ] NO     3: Disorganized thinking  -Was the patient’s thinking disorganized or incoherent, such as rambling or irrelevant conversation, unclear or illogical flow of ideas, or unpredictable switching from subject to subject?  [ ] YES [x ] NO    4: Altered Level of consciousness?  [ ] YES [x ] NO    The diagnosis of delirium by CAM requires the presence of features 1 and 2 and either 3 or 4.    PRESSORS: [ ] YES [x ] NO  DAPTOmycin IVPB 350 milliGRAM(s) IV Intermittent every 24 hours  fluconAZOLE IVPB 200 milliGRAM(s) IV Intermittent every 24 hours  fluconAZOLE IVPB      rifAXIMin 550 milliGRAM(s) Oral two times a day    Cardiovascular:  Heart Failure  Acute   Acute on Chronic  Chronic       furosemide    Tablet 20 milliGRAM(s) Oral daily    Pulmonary:  ALBUTerol    90 MICROgram(s) HFA Inhaler 2 Puff(s) Inhalation every 6 hours PRN    Hematalogic:  enoxaparin Injectable 50 milliGRAM(s) SubCutaneous daily    Other:  acetaminophen   Tablet .. 650 milliGRAM(s) Oral every 6 hours PRN  ascorbic acid 500 milliGRAM(s) Oral two times a day  chlorhexidine 2% Cloths 1 Application(s) Topical daily  dextrose 5%. 1000 milliLiter(s) IV Continuous <Continuous>  lactobacillus acidophilus 1 Tablet(s) Oral daily  multivitamin/minerals 1 Tablet(s) Oral daily  nystatin Powder 1 Application(s) Topical two times a day  pantoprazole   Suspension 40 milliGRAM(s) Enteral Tube daily  sodium chloride 0.9% lock flush 10 milliLiter(s) IV Push every 1 hour PRN  zinc sulfate 220 milliGRAM(s) Oral daily    acetaminophen   Tablet .. 650 milliGRAM(s) Oral every 6 hours PRN  ALBUTerol    90 MICROgram(s) HFA Inhaler 2 Puff(s) Inhalation every 6 hours PRN  ascorbic acid 500 milliGRAM(s) Oral two times a day  chlorhexidine 2% Cloths 1 Application(s) Topical daily  DAPTOmycin IVPB 350 milliGRAM(s) IV Intermittent every 24 hours  dextrose 5%. 1000 milliLiter(s) IV Continuous <Continuous>  enoxaparin Injectable 50 milliGRAM(s) SubCutaneous daily  fluconAZOLE IVPB 200 milliGRAM(s) IV Intermittent every 24 hours  fluconAZOLE IVPB      furosemide    Tablet 20 milliGRAM(s) Oral daily  lactobacillus acidophilus 1 Tablet(s) Oral daily  multivitamin/minerals 1 Tablet(s) Oral daily  nystatin Powder 1 Application(s) Topical two times a day  pantoprazole   Suspension 40 milliGRAM(s) Enteral Tube daily  rifAXIMin 550 milliGRAM(s) Oral two times a day  sodium chloride 0.9% lock flush 10 milliLiter(s) IV Push every 1 hour PRN  zinc sulfate 220 milliGRAM(s) Oral daily    Drug Dosing Weight  Height (cm): 167.6 (21 Oct 2020 02:26)  Weight (kg): 56.2 (21 Oct 2020 02:26)  BMI (kg/m2): 20 (21 Oct 2020 02:26)  BSA (m2): 1.63 (21 Oct 2020 02:26)    CENTRAL LINE: [ ] YES [x ] NO  LOCATION:   DATE INSERTED:  REMOVE: [ ] YES [ ] NO  EXPLAIN:    TREJO: [ ] YES [x ] NO    DATE INSERTED:  REMOVE:  [ ] YES [ ] NO  EXPLAIN:    PAST MEDICAL & SURGICAL HISTORY:  Anasarca    Pulmonary embolism    DVT (deep venous thrombosis)    SBO (small bowel obstruction)    H/O exploratory laparotomy        PHYSICAL EXAM:    GENERAL: NAD, thin  HEAD:  Atraumatic, Normocephalic  EYES: EOMI, PERRLA, conjunctiva and sclera clear  ENMT: No tonsillar erythema, exudates  NECK: Supple, No JVD  NERVOUS SYSTEM:  Alert & Oriented X1/2. Moving all extremities. Follows simple commands  CHEST/LUNG: Diminished breath sounds bilateral bases  HEART: Regular rate and rhythm; No murmurs, rubs, or gallops  ABDOMEN: Soft, Nontender, Nondistended; Bowel sounds present  EXTREMITIES:  2+ Peripheral Pulses, No clubbing, cyanosis, or edema  LYMPH: No lymphadenopathy noted  SKIN:  Unstageable sacrum      LABS:  CBC Full  -  ( 10 Dec 2020 07:51 )  WBC Count : 7.51 K/uL  RBC Count : 2.78 M/uL  Hemoglobin : 7.8 g/dL  Hematocrit : 27.0 %  Platelet Count - Automated : 289 K/uL  Mean Cell Volume : 97.1 fl  Mean Cell Hemoglobin : 28.1 pg  Mean Cell Hemoglobin Concentration : 28.9 gm/dL  Auto Neutrophil # : 5.20 K/uL  Auto Lymphocyte # : 1.43 K/uL  Auto Monocyte # : 0.50 K/uL  Auto Eosinophil # : 0.30 K/uL  Auto Basophil # : 0.06 K/uL  Auto Neutrophil % : 69.2 %  Auto Lymphocyte % : 19.0 %  Auto Monocyte % : 6.7 %  Auto Eosinophil % : 4.0 %  Auto Basophil % : 0.8 %    12-10    144  |  107  |  12  ----------------------------<  70  3.8   |  34<H>  |  0.54    Ca    8.3<L>      10 Dec 2020 07:51  Phos  3.2     12-10  Mg     2.0     12-10    TPro  6.3  /  Alb  1.8<L>  /  TBili  1.3<H>  /  DBili  x   /  AST  45<H>  /  ALT  33  /  AlkPhos  283<H>  12-10              [  ]  DVT Prophylaxis  [  ]  Nutrition, Brand, Rate         Goal Rate        Abnormal Nutritional Status -  Malnutrition   Cachexia          RADIOLOGY & ADDITIONAL STUDIES:  ***  < from: Xray Chest 1 View- PORTABLE-Routine (Xray Chest 1 View- PORTABLE-Routine in AM.) (12.09.20 @ 11:54) >  Heart is likely enlarged. Left PICCline remains.    There are sizable effusions left greater than right. Adjacent infiltrate is possible.    Noted is an IVC filter.    Allowing for technique, study is similar to December 8.    IMPRESSION: Persistent effusions left greater than right.    < end of copied text >    Goals of Care Discussion with Family/Proxy/Other   - see note from 11/18

## 2020-12-10 NOTE — PROGRESS NOTE ADULT - ASSESSMENT
Patient is a 65y old  Female from home, lives with daughter, ambulates with walker with PMH of recurrent SBO's, s/p exp lap, SB resection in 2015, ex lap, ALBINA in 2018, DVT, PE, on Xarelto, IVC filter, chronic leg swelling, and anasarca, Now send in to the ER by her PCP, Dr. Ross, for evaluation of  generalized weakness and hypotension BP of 80/40 during office visit. On admission, she found to have no fever and BP was in lower normal range and no Leukocytosis. The CXR is clear and CT abd/pelvis consistent with Ileus. The ID consult requested to assist with evaluation of infectious etiology of episode of hypotension. Found to have Bacteroides bacteremia and now developed septic shock on Cefepime and Flagyl. Hence transferred to ICU. 10/20/20.    # Hypotension  at office- BP of 80/40 - resolved   #  Bacteroides fragilis Bacteremia - 10/13/20 - ? source most likely GI- Repeat Blood Cxs have NGTD 10/16/20  # Ileus  - h/o recurrent SBO and s/p EXp. LAp  # COVID 19 negative   # Septic shock ( Hypothermia + hypotensive)- transferred to ICU since requiring pressor- source GI, The CT abd/pelvis shows nonspecific enterocolitis, noninfectious inflammatory bowel disease, or ischemic bowel, along with ileus.   # Pneumonia- HCAP vs Aspiration - on CXR 10/24 and CT chest 10/21- sputum Cx grew Methicillin resistant Staphylococcus aureus  and Stenotrophomonas maltophilia.  # S/p extubated 11/9/20  # Infected sacral ulcer- s/p debridement and culture grew Enterococcus, VRE and Candida, sensitivity is pending- The MRI of pelvis shows  Osteomyelitis of the lower sacrum and coccyx with overlying cutaneous ulceration.   # Fever- 11/8 and 11/19- BCX NGTD 11/19 - The CXR shows Improvement in bilateral airspace disease with persistent bibasilar consolidation. She is s/p removal of velazquez catheter and passed TOV.  # HCAP- 11/27/20,  Intermittent fever and CXR shows  bilateral consolidations right greater than left. - The procalcitonin level is 1.55  # s/p PICC line placement 11/30  # Persistent effusions left greater than right      would recommend:    1. Please Add Mupirocin ointment to apply to both nares since MRSA PCR is positive   2. Continue Daptomycin to cover VRE  and  Fluconazole to cover Candida in sacral wound culture X 6  weeks to treat Osteomyelitis,  might change to oral Linezolid  and oral fluconazole on discharge to continue until  12/29/20  3. Aspiration precaution  4. Frequent repositioning   5. OOB to chair       Attending Attestation:    Spent more than 45 minutes on total encounter, more than 50 % of the visit was spent counseling and/or coordinating care by the Attending physician.

## 2020-12-10 NOTE — PROGRESS NOTE ADULT - PROBLEM SELECTOR PLAN 1
Bacteroides fragilis per BC on 10/13  Sputum positive for MRSA and Stenotrophomonas  Aspiration pneumonia vs HCAP .   Repeat BC 10/16 and 11/19 NGTD ,UC on 11/20 NGTD   Infected sacral ulcer- s/p debridement and culture grew Enterococcus (VRE) and Candida ( sensitivity is pending)  Leukocytosis resolved, Cdiff negative  MRI sacrum/pelvis shows OM   c/w abx per ID Dapto until 12/28. can switch to linezolid on 12/14 for 14 days.  `PICC  for extended abx (for total of 6 weeks, thru Dec 28th).

## 2020-12-10 NOTE — PROGRESS NOTE ADULT - SUBJECTIVE AND OBJECTIVE BOX
Patient was seen and examined  Patient is a 65y old  Female who presents with a chief complaint of Hypotension (09 Dec 2020 16:05)      INTERVAL HPI/OVERNIGHT EVENTS:  T(C): 36.6 (12-10-20 @ 05:02), Max: 36.9 (12-09-20 @ 20:33)  HR: 96 (12-10-20 @ 05:02) (96 - 99)  BP: 115/80 (12-10-20 @ 05:02) (102/58 - 115/80)  RR: 19 (12-10-20 @ 05:02) (17 - 19)  SpO2: 100% (12-10-20 @ 05:02) (100% - 100%)  Wt(kg): --  I&O's Summary      LABS:                        7.8    7.51  )-----------( 289      ( 10 Dec 2020 07:51 )             27.0     12-10    144  |  107  |  12  ----------------------------<  70  3.8   |  34<H>  |  0.54    Ca    8.3<L>      10 Dec 2020 07:51  Phos  3.2     12-10  Mg     2.0     12-10    TPro  6.3  /  Alb  1.8<L>  /  TBili  1.3<H>  /  DBili  x   /  AST  45<H>  /  ALT  33  /  AlkPhos  283<H>  12-10        CAPILLARY BLOOD GLUCOSE      POCT Blood Glucose.: 84 mg/dL (10 Dec 2020 05:14)  POCT Blood Glucose.: 96 mg/dL (09 Dec 2020 23:36)  POCT Blood Glucose.: 112 mg/dL (09 Dec 2020 11:27)              MEDICATIONS  (STANDING):  ascorbic acid 500 milliGRAM(s) Oral two times a day  chlorhexidine 2% Cloths 1 Application(s) Topical daily  DAPTOmycin IVPB 350 milliGRAM(s) IV Intermittent every 24 hours  dextrose 5%. 1000 milliLiter(s) (40 mL/Hr) IV Continuous <Continuous>  enoxaparin Injectable 50 milliGRAM(s) SubCutaneous daily  fluconAZOLE IVPB 200 milliGRAM(s) IV Intermittent every 24 hours  fluconAZOLE IVPB      furosemide    Tablet 20 milliGRAM(s) Oral daily  lactobacillus acidophilus 1 Tablet(s) Oral daily  multivitamin/minerals 1 Tablet(s) Oral daily  nystatin Powder 1 Application(s) Topical two times a day  pantoprazole   Suspension 40 milliGRAM(s) Enteral Tube daily  rifAXIMin 550 milliGRAM(s) Oral two times a day  zinc sulfate 220 milliGRAM(s) Oral daily    MEDICATIONS  (PRN):  acetaminophen   Tablet .. 650 milliGRAM(s) Oral every 6 hours PRN Temp greater or equal to 38C (100.4F), Mild Pain (1 - 3), Moderate Pain (4 - 6)  ALBUTerol    90 MICROgram(s) HFA Inhaler 2 Puff(s) Inhalation every 6 hours PRN Shortness of Breath and/or Wheezing  sodium chloride 0.9% lock flush 10 milliLiter(s) IV Push every 1 hour PRN Pre/post blood products, medications, blood draw, and to maintain line patency      RADIOLOGY & ADDITIONAL TESTS:    Imaging Personally Reviewed:  [ ] YES  [ ] NO    REVIEW OF SYSTEMS:  CONSTITUTIONAL: No fever, weight loss, or fatigue  EYES: No eye pain, visual disturbances, or discharge  ENMT:  No difficulty hearing, tinnitus, vertigo; No sinus or throat pain  NECK: No pain or stiffness  BREASTS: No pain, masses, or nipple discharge  RESPIRATORY: No cough, wheezing, chills or hemoptysis; No shortness of breath  CARDIOVASCULAR: No chest pain, palpitations, dizziness, or leg swelling  GASTROINTESTINAL: No abdominal or epigastric pain. No nausea, vomiting, or hematemesis; No diarrhea or constipation. No melena or hematochezia.  GENITOURINARY: No dysuria, frequency, hematuria, or incontinence  NEUROLOGICAL: No headaches, memory loss, loss of strength, numbness, or tremors  SKIN: No itching, burning, rashes, or lesions   LYMPH NODES: No enlarged glands  ENDOCRINE: No heat or cold intolerance; No hair loss  MUSCULOSKELETAL: No joint pain or swelling; No muscle, back, or extremity pain  PSYCHIATRIC: No depression, anxiety, mood swings, or difficulty sleeping  HEME/LYMPH: No easy bruising, or bleeding gums  ALLERY AND IMMUNOLOGIC: No hives or eczema      Consultant(s) Notes Reviewed:  [ x ] YES  [ ] NO    PHYSICAL EXAM:  GENERAL: NAD, well-groomed, well-developed  HEAD:  Atraumatic, Normocephalic  EYES: EOMI, PERRLA, conjunctiva and sclera clear  ENMT: No tonsillar erythema, exudates, or enlargement; Moist mucous membranes, Good dentition, No lesions  NECK: Supple, No JVD, Normal thyroid  NERVOUS SYSTEM:  Alert & Oriented X3, Good concentration; Motor Strength 5/5 B/L upper and lower extremities; DTRs 2+ intact and symmetric  CHEST/LUNG: Clear to percussion bilaterally; No rales, rhonchi, wheezing, or rubs  HEART: Regular rate and rhythm; No murmurs, rubs, or gallops  ABDOMEN: Soft, Nontender, Nondistended; Bowel sounds present  EXTREMITIES:  2+ Peripheral Pulses, No clubbing, cyanosis, or edema  LYMPH: No lymphadenopathy noted  SKIN: No rashes or lesions    Care Discussed with Consultants/Other Providers [ x] YES  [ ] NO

## 2020-12-11 NOTE — PHYSICAL THERAPY INITIAL EVALUATION ADULT - IMPAIRMENTS CONTRIBUTING TO GAIT DEVIATIONS, PT EVAL
impaired balance/decreased strength/decreased ROM/narrow base of support/impaired postural control
impaired balance/decreased strength/narrow base of support/impaired postural control/decreased ROM

## 2020-12-11 NOTE — PHYSICAL THERAPY INITIAL EVALUATION ADULT - BED MOBILITY LIMITATIONS, REHAB EVAL
decreased ability to use legs for bridging/pushing/decreased ability to use arms for pushing/pulling/impaired ability to control trunk for mobility
decreased ability to use arms for pushing/pulling/decreased ability to use legs for bridging/pushing/impaired ability to control trunk for mobility
decreased ability to use arms for pushing/pulling/decreased ability to use legs for bridging/pushing

## 2020-12-11 NOTE — PROGRESS NOTE ADULT - ASSESSMENT
Patient is a 65y old  Female from home, lives with daughter, ambulates with walker with PMH of recurrent SBO's, s/p exp lap, SB resection in 2015, ex lap, ALBINA in 2018, DVT, PE, on Xarelto, IVC filter, chronic leg swelling, and anasarca, Now send in to the ER by her PCP, Dr. Ross, for evaluation of  generalized weakness and hypotension BP of 80/40 during office visit. On admission, she found to have no fever and BP was in lower normal range and no Leukocytosis. The CXR is clear and CT abd/pelvis consistent with Ileus. The ID consult requested to assist with evaluation of infectious etiology of episode of hypotension. Found to have Bacteroides bacteremia and now developed septic shock on Cefepime and Flagyl. Hence transferred to ICU. 10/20/20.    # Hypotension  at office- BP of 80/40 - resolved   #  Bacteroides fragilis Bacteremia - 10/13/20 - ? source most likely GI- Repeat Blood Cxs have NGTD 10/16/20  # Ileus  - h/o recurrent SBO and s/p EXp. LAp  # COVID 19 negative   # Septic shock ( Hypothermia + hypotensive)- transferred to ICU since requiring pressor- source GI, The CT abd/pelvis shows nonspecific enterocolitis, noninfectious inflammatory bowel disease, or ischemic bowel, along with ileus.   # Pneumonia- HCAP vs Aspiration - on CXR 10/24 and CT chest 10/21- sputum Cx grew Methicillin resistant Staphylococcus aureus  and Stenotrophomonas maltophilia.  # S/p extubated 11/9/20  # Infected sacral ulcer- s/p debridement and culture grew Enterococcus, VRE and Candida, sensitivity is pending- The MRI of pelvis shows  Osteomyelitis of the lower sacrum and coccyx with overlying cutaneous ulceration.   # Fever- 11/8 and 11/19- BCX NGTD 11/19 - The CXR shows Improvement in bilateral airspace disease with persistent bibasilar consolidation. She is s/p removal of velazquez catheter and passed TOV.  # HCAP- 11/27/20,  Intermittent fever and CXR shows  bilateral consolidations right greater than left. - The procalcitonin level is 1.55  # s/p PICC line placement 11/30  # Persistent effusions left greater than right      would recommend:    1. Continue  Mupirocin ointment to apply to both nares since MRSA PCR is positive   2. Continue Daptomycin to cover VRE  and  Fluconazole to cover Candida in sacral wound culture X 6  weeks to treat Osteomyelitis,  might change to oral Linezolid  and oral fluconazole on discharge to continue until  12/29/20  3. Aspiration precaution  4. Frequent repositioning   5. OOB to chair       Attending Attestation:    Spent more than 45 minutes on total encounter, more than 50 % of the visit was spent counseling and/or coordinating care by the Attending physician.

## 2020-12-11 NOTE — PHYSICAL THERAPY INITIAL EVALUATION ADULT - PERTINENT HX OF CURRENT PROBLEM, REHAB EVAL
hypotension, OM sacral /coccyx secondary to wounds
hypotension, OM sacral /coccyx secondary to wounds, now Lt. PICC
Patient admitted from home due to generalized weakness and Hypotension; patient w/ h/o  recurrent SOB, s/p exlap and chronic leg swelling
hypotension

## 2020-12-11 NOTE — PHYSICAL THERAPY INITIAL EVALUATION ADULT - LEVEL OF INDEPENDENCE: BED TO CHAIR, REHAB EVAL
unable to perform/due to inc. weakness
today due to low BP. RN made aware/unable to perform
maximum assist (25% patients effort)
maximum assist (25% patients effort)

## 2020-12-11 NOTE — PHYSICAL THERAPY INITIAL EVALUATION ADULT - PHYSICAL ASSIST/NONPHYSICAL ASSIST: GAIT, REHAB EVAL
1 person assist/verbal cues/nonverbal cues (demo/gestures)
nonverbal cues (demo/gestures)/1 person assist/verbal cues

## 2020-12-11 NOTE — PHYSICAL THERAPY INITIAL EVALUATION ADULT - PRECAUTIONS/LIMITATIONS, REHAB EVAL
fall precautions
fall precautions/LUE precautions secondary to PICC
fall precautions
fall precautions

## 2020-12-11 NOTE — PHYSICAL THERAPY INITIAL EVALUATION ADULT - IMPAIRED TRANSFERS: SIT/STAND, REHAB EVAL
impaired postural control/impaired balance/decreased strength/decreased ROM/narrow base of support
decreased strength/impaired balance/narrow base of support/impaired postural control/decreased ROM

## 2020-12-11 NOTE — PHYSICAL THERAPY INITIAL EVALUATION ADULT - ADL SKILLS, REHAB EVAL
needed assist/family helps as needed
needed assist/family helps as needed
needed assist
needed assist/family helps as needed

## 2020-12-11 NOTE — PHYSICAL THERAPY INITIAL EVALUATION ADULT - DISCHARGE DISPOSITION, PT EVAL
Recommending SUB-ACUTE REHAB for intensive strengthening, gait, balance and transfer training for safer transition to home./rehabilitation facility
Sub acute rehab to improve functional status/rehabilitation facility
Sub- Acute Rehab./rehabilitation facility
rehabilitation facility/Sub- Acute Rehab.

## 2020-12-11 NOTE — PHYSICAL THERAPY INITIAL EVALUATION ADULT - LEVEL OF INDEPENDENCE: SIT/SUPINE, REHAB EVAL
moderate assist (50% patients effort)
unable to perform/due to inc. weakness
minimum assist (75% patients effort)

## 2020-12-11 NOTE — PROGRESS NOTE ADULT - SUBJECTIVE AND OBJECTIVE BOX
MARIBETH PADILLA    SCU progress note    INTERVAL HPI/OVERNIGHT EVENTS: ***No overnight events    DNR [x ]   DNI  [  ]  TRIAL OF INTUBATION    Covid - 19 PCR: Negative 11/30    The 4Ms    What Matters Most: see [facility]  Age appropriate Medications/Screen for High Risk Medication: Yes  Mentation: see CAM below  Mobility: defer to physical exam    The Confusion Assessment Method (CAM) Diagnostic Algorithm     1: Acute Onset or Fluctuating Course  - Is there evidence of an acute change in mental status from the patient’s baseline? Did the (abnormal) behavior  fluctuate during the day, that is, tend to come and go, or increase and decrease in severity?  [ ] YES [x ] NO     2: Inattention  - Did the patient have difficulty focusing attention, being easily distractible, or having difficulty keeping track of what was being said?  [ ] YES [x ] NO     3: Disorganized thinking  -Was the patient’s thinking disorganized or incoherent, such as rambling or irrelevant conversation, unclear or illogical flow of ideas, or unpredictable switching from subject to subject?  [ ] YES [x ] NO    4: Altered Level of consciousness?  [ ] YES [x ] NO    The diagnosis of delirium by CAM requires the presence of features 1 and 2 and either 3 or 4.    PRESSORS: [ ] YES [x ] NO  DAPTOmycin IVPB 350 milliGRAM(s) IV Intermittent every 24 hours  fluconAZOLE IVPB 200 milliGRAM(s) IV Intermittent every 24 hours  fluconAZOLE IVPB      rifAXIMin 550 milliGRAM(s) Oral two times a day    Cardiovascular:  Heart Failure  Acute   Acute on Chronic  Chronic       furosemide    Tablet 20 milliGRAM(s) Oral daily    Pulmonary:  ALBUTerol    90 MICROgram(s) HFA Inhaler 2 Puff(s) Inhalation every 6 hours PRN    Hematalogic:  enoxaparin Injectable 50 milliGRAM(s) SubCutaneous daily    Other:  acetaminophen   Tablet .. 650 milliGRAM(s) Oral every 6 hours PRN  ascorbic acid 500 milliGRAM(s) Oral two times a day  chlorhexidine 2% Cloths 1 Application(s) Topical daily  lactobacillus acidophilus 1 Tablet(s) Oral daily  multivitamin/minerals 1 Tablet(s) Oral daily  mupirocin 2% Ointment 1 Application(s) Topical every 12 hours  nystatin Powder 1 Application(s) Topical two times a day  pantoprazole   Suspension 40 milliGRAM(s) Enteral Tube daily  sodium chloride 0.9% lock flush 10 milliLiter(s) IV Push every 1 hour PRN  zinc sulfate 220 milliGRAM(s) Oral daily    acetaminophen   Tablet .. 650 milliGRAM(s) Oral every 6 hours PRN  ALBUTerol    90 MICROgram(s) HFA Inhaler 2 Puff(s) Inhalation every 6 hours PRN  ascorbic acid 500 milliGRAM(s) Oral two times a day  chlorhexidine 2% Cloths 1 Application(s) Topical daily  DAPTOmycin IVPB 350 milliGRAM(s) IV Intermittent every 24 hours  enoxaparin Injectable 50 milliGRAM(s) SubCutaneous daily  fluconAZOLE IVPB 200 milliGRAM(s) IV Intermittent every 24 hours  fluconAZOLE IVPB      furosemide    Tablet 20 milliGRAM(s) Oral daily  lactobacillus acidophilus 1 Tablet(s) Oral daily  multivitamin/minerals 1 Tablet(s) Oral daily  mupirocin 2% Ointment 1 Application(s) Topical every 12 hours  nystatin Powder 1 Application(s) Topical two times a day  pantoprazole   Suspension 40 milliGRAM(s) Enteral Tube daily  rifAXIMin 550 milliGRAM(s) Oral two times a day  sodium chloride 0.9% lock flush 10 milliLiter(s) IV Push every 1 hour PRN  zinc sulfate 220 milliGRAM(s) Oral daily    Drug Dosing Weight  Height (cm): 167.6 (21 Oct 2020 02:26)  Weight (kg): 56.2 (21 Oct 2020 02:26)  BMI (kg/m2): 20 (21 Oct 2020 02:26)  BSA (m2): 1.63 (21 Oct 2020 02:26)    CENTRAL LINE: [ ] YES [x ] NO  LOCATION:   DATE INSERTED:  REMOVE: [ ] YES [ ] NO  EXPLAIN:    TREJO: [ ] YES [x ] NO    DATE INSERTED:  REMOVE:  [ ] YES [ ] NO  EXPLAIN:    PAST MEDICAL & SURGICAL HISTORY:  Anasarca    Pulmonary embolism    DVT (deep venous thrombosis)    SBO (small bowel obstruction)    H/O exploratory laparotomy                      PHYSICAL EXAM:    GENERAL: NAD, thin  HEAD:  Atraumatic, Normocephalic  EYES: EOMI, PERRLA, conjunctiva and sclera clear  ENMT: No tonsillar erythema, exudates  NECK: Supple, No JVD,  NERVOUS SYSTEM:  Alert & Oriented X1/2, Follows simple commands. Moving all extremities.  CHEST/LUNG: Diminished breath sounds bilateral bases  HEART: Regular rate and rhythm; No murmurs, rubs, or gallops  ABDOMEN: Soft, Nontender, Nondistended; Bowel sounds present  EXTREMITIES:  2+ Peripheral Pulses, No clubbing, cyanosis, or edema  LYMPH: No lymphadenopathy noted  SKIN: Unstageable sacrum      LABS:  CBC Full  -  ( 11 Dec 2020 06:51 )  WBC Count : 7.91 K/uL  RBC Count : 2.87 M/uL  Hemoglobin : 8.0 g/dL  Hematocrit : 27.2 %  Platelet Count - Automated : 316 K/uL  Mean Cell Volume : 94.8 fl  Mean Cell Hemoglobin : 27.9 pg  Mean Cell Hemoglobin Concentration : 29.4 gm/dL  Auto Neutrophil # : 5.45 K/uL  Auto Lymphocyte # : 1.65 K/uL  Auto Monocyte # : 0.54 K/uL  Auto Eosinophil # : 0.20 K/uL  Auto Basophil # : 0.04 K/uL  Auto Neutrophil % : 68.9 %  Auto Lymphocyte % : 20.9 %  Auto Monocyte % : 6.8 %  Auto Eosinophil % : 2.5 %  Auto Basophil % : 0.5 %    12-11    143  |  103  |  10  ----------------------------<  89  3.6   |  35<H>  |  0.56    Ca    8.4      11 Dec 2020 06:51  Phos  3.2     12-11  Mg     2.0     12-11    TPro  6.5  /  Alb  1.9<L>  /  TBili  1.2  /  DBili  x   /  AST  42<H>  /  ALT  34  /  AlkPhos  297<H>  12-11              [  ]  DVT Prophylaxis  [  ]  Nutrition, Brand, Rate         Goal Rate        Abnormal Nutritional Status -  Malnutrition   Cachexia      Morbid Obesity BMI >/=40    RADIOLOGY & ADDITIONAL STUDIES:  ***    Goals of Care Discussion with Family/Proxy/Other   - see note from/family meeting set up for...     MARIBETH PADILLA    SCU progress note    INTERVAL HPI/OVERNIGHT EVENTS: ***No overnight events    DNR [x ]   DNI  [  ]  TRIAL OF INTUBATION    Covid - 19 PCR: Negative 11/30    The 4Ms    What Matters Most: see [facility]  Age appropriate Medications/Screen for High Risk Medication: Yes  Mentation: see CAM below  Mobility: defer to physical exam    The Confusion Assessment Method (CAM) Diagnostic Algorithm     1: Acute Onset or Fluctuating Course  - Is there evidence of an acute change in mental status from the patient’s baseline? Did the (abnormal) behavior  fluctuate during the day, that is, tend to come and go, or increase and decrease in severity?  [ ] YES [x ] NO     2: Inattention  - Did the patient have difficulty focusing attention, being easily distractible, or having difficulty keeping track of what was being said?  [ ] YES [x ] NO     3: Disorganized thinking  -Was the patient’s thinking disorganized or incoherent, such as rambling or irrelevant conversation, unclear or illogical flow of ideas, or unpredictable switching from subject to subject?  [ ] YES [x ] NO    4: Altered Level of consciousness?  [ ] YES [x ] NO    The diagnosis of delirium by CAM requires the presence of features 1 and 2 and either 3 or 4.    PRESSORS: [ ] YES [x ] NO  DAPTOmycin IVPB 350 milliGRAM(s) IV Intermittent every 24 hours  fluconAZOLE IVPB 200 milliGRAM(s) IV Intermittent every 24 hours  fluconAZOLE IVPB      rifAXIMin 550 milliGRAM(s) Oral two times a day    Cardiovascular:  Heart Failure  Acute   Acute on Chronic  Chronic       furosemide    Tablet 20 milliGRAM(s) Oral daily    Pulmonary:  ALBUTerol    90 MICROgram(s) HFA Inhaler 2 Puff(s) Inhalation every 6 hours PRN    Hematalogic:  enoxaparin Injectable 50 milliGRAM(s) SubCutaneous daily    Other:  acetaminophen   Tablet .. 650 milliGRAM(s) Oral every 6 hours PRN  ascorbic acid 500 milliGRAM(s) Oral two times a day  chlorhexidine 2% Cloths 1 Application(s) Topical daily  lactobacillus acidophilus 1 Tablet(s) Oral daily  multivitamin/minerals 1 Tablet(s) Oral daily  mupirocin 2% Ointment 1 Application(s) Topical every 12 hours  nystatin Powder 1 Application(s) Topical two times a day  pantoprazole   Suspension 40 milliGRAM(s) Enteral Tube daily  sodium chloride 0.9% lock flush 10 milliLiter(s) IV Push every 1 hour PRN  zinc sulfate 220 milliGRAM(s) Oral daily    acetaminophen   Tablet .. 650 milliGRAM(s) Oral every 6 hours PRN  ALBUTerol    90 MICROgram(s) HFA Inhaler 2 Puff(s) Inhalation every 6 hours PRN  ascorbic acid 500 milliGRAM(s) Oral two times a day  chlorhexidine 2% Cloths 1 Application(s) Topical daily  DAPTOmycin IVPB 350 milliGRAM(s) IV Intermittent every 24 hours  enoxaparin Injectable 50 milliGRAM(s) SubCutaneous daily  fluconAZOLE IVPB 200 milliGRAM(s) IV Intermittent every 24 hours  fluconAZOLE IVPB      furosemide    Tablet 20 milliGRAM(s) Oral daily  lactobacillus acidophilus 1 Tablet(s) Oral daily  multivitamin/minerals 1 Tablet(s) Oral daily  mupirocin 2% Ointment 1 Application(s) Topical every 12 hours  nystatin Powder 1 Application(s) Topical two times a day  pantoprazole   Suspension 40 milliGRAM(s) Enteral Tube daily  rifAXIMin 550 milliGRAM(s) Oral two times a day  sodium chloride 0.9% lock flush 10 milliLiter(s) IV Push every 1 hour PRN  zinc sulfate 220 milliGRAM(s) Oral daily    Drug Dosing Weight  Height (cm): 167.6 (21 Oct 2020 02:26)  Weight (kg): 56.2 (21 Oct 2020 02:26)  BMI (kg/m2): 20 (21 Oct 2020 02:26)  BSA (m2): 1.63 (21 Oct 2020 02:26)    CENTRAL LINE: [ ] YES [x ] NO  LOCATION:   DATE INSERTED:  REMOVE: [ ] YES [ ] NO  EXPLAIN:    TREJO: [ ] YES [x ] NO    DATE INSERTED:  REMOVE:  [ ] YES [ ] NO  EXPLAIN:    PAST MEDICAL & SURGICAL HISTORY:  Anasarca    Pulmonary embolism    DVT (deep venous thrombosis)    SBO (small bowel obstruction)    H/O exploratory laparotomy                      PHYSICAL EXAM:    GENERAL: NAD, thin  HEAD:  Atraumatic, Normocephalic  EYES: EOMI, PERRLA, conjunctiva and sclera clear  ENMT: No tonsillar erythema, exudates  NECK: Supple, No JVD,  NERVOUS SYSTEM:  Alert & Oriented X1/2, Follows simple commands. Moving all extremities.  CHEST/LUNG: Diminished breath sounds bilateral bases  HEART: Regular rate and rhythm; No murmurs, rubs, or gallops  ABDOMEN: Soft, Nontender, Nondistended; Bowel sounds present  EXTREMITIES:  2+ Peripheral Pulses, No clubbing, cyanosis, or edema  LYMPH: No lymphadenopathy noted  SKIN: Unstageable sacrum      LABS:  CBC Full  -  ( 11 Dec 2020 06:51 )  WBC Count : 7.91 K/uL  RBC Count : 2.87 M/uL  Hemoglobin : 8.0 g/dL  Hematocrit : 27.2 %  Platelet Count - Automated : 316 K/uL  Mean Cell Volume : 94.8 fl  Mean Cell Hemoglobin : 27.9 pg  Mean Cell Hemoglobin Concentration : 29.4 gm/dL  Auto Neutrophil # : 5.45 K/uL  Auto Lymphocyte # : 1.65 K/uL  Auto Monocyte # : 0.54 K/uL  Auto Eosinophil # : 0.20 K/uL  Auto Basophil # : 0.04 K/uL  Auto Neutrophil % : 68.9 %  Auto Lymphocyte % : 20.9 %  Auto Monocyte % : 6.8 %  Auto Eosinophil % : 2.5 %  Auto Basophil % : 0.5 %    12-11    143  |  103  |  10  ----------------------------<  89  3.6   |  35<H>  |  0.56    Ca    8.4      11 Dec 2020 06:51  Phos  3.2     12-11  Mg     2.0     12-11    TPro  6.5  /  Alb  1.9<L>  /  TBili  1.2  /  DBili  x   /  AST  42<H>  /  ALT  34  /  AlkPhos  297<H>  12-11              [  ]  DVT Prophylaxis  [  ]  Nutrition, Brand, Rate         Goal Rate        Abnormal Nutritional Status -  Malnutrition   Cachexia      Morbid Obesity BMI >/=40    RADIOLOGY & ADDITIONAL STUDIES:  ***  < from: Xray Chest 1 View- PORTABLE-Routine (Xray Chest 1 View- PORTABLE-Routine in AM.) (12.09.20 @ 11:54) >  Heart is likely enlarged. Left PICCline remains.    There are sizable effusions left greater than right. Adjacent infiltrate is possible.    Noted is an IVC filter.    Allowing for technique, study is similar to December 8.    IMPRESSION: Persistent effusions left greater than right.    < end of copied text >    Goals of Care Discussion with Family/Proxy/Other   - see note from 11/18

## 2020-12-11 NOTE — PHYSICAL THERAPY INITIAL EVALUATION ADULT - PASSIVE RANGE OF MOTION EXAMINATION, REHAB EVAL
bilateral upper extremity Passive ROM was WFL (within functional limits)/bilateral lower extremity Passive ROM was WFL (within functional limits)/Lt. shoulder only assessed to 90 deg flx secondary to PICC. Rt. shoulder ~4/5 range

## 2020-12-11 NOTE — PHYSICAL THERAPY INITIAL EVALUATION ADULT - GAIT DEVIATIONS NOTED, PT EVAL
decreased stride length/decreased weight-shifting ability/decreased zoie/decreased step length
decreased zoie/decreased step length/decreased weight-shifting ability/decreased stride length

## 2020-12-11 NOTE — PHYSICAL THERAPY INITIAL EVALUATION ADULT - GENERAL OBSERVATIONS, REHAB EVAL
Consult received, chart reviewed. Patient received supine in bed, NAD, +NC. Patient agreed to RE EVALUATION from Physical Therapist.
Consult received, chart reviewed. Patient received supine in bed, NAD, +NC. Patient agreed to RE EVALUATION from Physical Therapist. More tired today, but motivated to participate.
Patient received in supine; AxOx3; w/ dec. vocalization w decreased endurance, edema to (L) hand, w/ stage 3 sacral ulcer.
Consult received,EMR, radiology and labs reviewed. Patient received supine in bed, c/o of generalized weakness , malnutrition. Patient agreed to EVALUATION from Physical Therapist.

## 2020-12-11 NOTE — PHYSICAL THERAPY INITIAL EVALUATION ADULT - REHAB POTENTIAL, PT EVAL
fair, will monitor progress closely
good, to achieve stated therapy goals

## 2020-12-11 NOTE — PHYSICAL THERAPY INITIAL EVALUATION ADULT - PHYSICAL ASSIST/NONPHYSICAL ASSIST: SUPINE/SIT, REHAB EVAL
1 person assist/nonverbal cues (demo/gestures)/verbal cues
verbal cues/nonverbal cues (demo/gestures)/1 person assist
1 person assist

## 2020-12-11 NOTE — PHYSICAL THERAPY INITIAL EVALUATION ADULT - PHYSICAL ASSIST/NONPHYSICAL ASSIST: BED TO CHAIR, REHAB EVAL
verbal cues/nonverbal cues (demo/gestures)/1 person assist
verbal cues/nonverbal cues (demo/gestures)/1 person assist

## 2020-12-11 NOTE — PHYSICAL THERAPY INITIAL EVALUATION ADULT - LIVES WITH, PROFILE
children
daughter in an apartment
Per IE, daughter in an apartment, elevator no stairs required
Per IE, daughter in an apartment, elevator no stairs required

## 2020-12-11 NOTE — PROGRESS NOTE ADULT - SUBJECTIVE AND OBJECTIVE BOX
Patient is seen and examined at the bed side, remains afebrile.  No new overnight events,       REVIEW OF SYSTEMS: All other review systems are negative      ALLERGIES: No Known Allergies        Vital Signs Last 24 Hrs  T(C): 37.7 (11 Dec 2020 13:06), Max: 37.7 (11 Dec 2020 13:06)  T(F): 99.9 (11 Dec 2020 13:06), Max: 99.9 (11 Dec 2020 13:06)  HR: 100 (11 Dec 2020 13:06) (98 - 114)  BP: 115/77 (11 Dec 2020 13:06) (115/77 - 117/78)  BP(mean): --  RR: 18 (11 Dec 2020 13:06) (18 - 18)  SpO2: 100% (11 Dec 2020 13:06) (94% - 100%)        PHYSICAL EXAM:  GENERAL: Not in distress, on oxygen via NC  CHEST/LUNG: Not using accessory muscles   HEART: s1 and s2 present  ABDOMEN:  Nontender and  Nondistended  EXTREMITIES: B/L UE edematous improved  CNS: Awake and Alert         LABS:                        8.0    7.91  )-----------( 316      ( 11 Dec 2020 06:51 )             27.2                9.9    9.41  )-----------( 269      ( 24 Nov 2020 06:38 )             31.5                           9.4    13.14 )-----------( 149      ( 14 Nov 2020 07:58 )             28.7         12-11    143  |  103  |  10  ----------------------------<  89  3.6   |  35<H>  |  0.56    Ca    8.4      11 Dec 2020 06:51  Phos  3.2     12-11  Mg     2.0     12-11    TPro  6.5  /  Alb  1.9<L>  /  TBili  1.2  /  DBili  x   /  AST  42<H>  /  ALT  34  /  AlkPhos  297<H>  12-11 11-06    138  |  104  |  37<H>  ----------------------------<  81  3.9   |  25  |  2.37<H>    Ca    8.1<L>      06 Nov 2020 06:20  Phos  3.4     11-06  Mg     2.0     11-06    TPro  5.1<L>  /  Alb  2.8<L>  /  TBili  9.2<H>  /  DBili  x   /  AST  233<H>  /  ALT  99<H>  /  AlkPhos  96  11-06      Vancomycin Level, Trough (11.07.20 @ 21:49)   Vancomycin Level, Trough: 16.1:     Vancomycin Level, Trough (11.07.20 @ 07:08) : 19.5  Vancomycin Level, Trough (11.06.20 @ 06:20) : 19.9:   Procalcitonin, Serum (11.28.20 @ 15:38) : 1.55:       MEDICATIONS  (STANDING):    ascorbic acid 500 milliGRAM(s) Oral two times a day  chlorhexidine 2% Cloths 1 Application(s) Topical daily  DAPTOmycin IVPB 350 milliGRAM(s) IV Intermittent every 24 hours  enoxaparin Injectable 50 milliGRAM(s) SubCutaneous daily  fluconAZOLE IVPB 200 milliGRAM(s) IV Intermittent every 24 hours  fluconAZOLE IVPB      furosemide    Tablet 20 milliGRAM(s) Oral daily  lactobacillus acidophilus 1 Tablet(s) Oral daily  multivitamin/minerals 1 Tablet(s) Oral daily  mupirocin 2% Ointment 1 Application(s) Topical every 12 hours  nystatin Powder 1 Application(s) Topical two times a day  pantoprazole   Suspension 40 milliGRAM(s) Enteral Tube daily  rifAXIMin 550 milliGRAM(s) Oral two times a day  zinc sulfate 220 milliGRAM(s) Oral daily      RADIOLOGY & ADDITIONAL TESTS:    12/9/20 : Xray Chest 1 View- PORTABLE-Routine (Xray Chest 1 View- PORTABLE-Routine in AM.) (12.09.20 @ 11:54) >  IMPRESSION: Persistent effusions left greater than right.      11/27/20 : Xray Chest 1 View- PORTABLE-Urgent (Xray Chest 1 View- PORTABLE-Urgent .) (11.27.20 @ 06:53) Increased interstitial lung markings with bilateral consolidations right greater than left. Small right pleural effusion. Overall worsening compared to prior exam.      11/24/20 : MR Pelvis Bony Only No Cont (11.24.20 @ 18:02) : Osteomyelitis of the lower sacrum and coccyx with overlying cutaneous ulceration,      11/19/20 : Xray Chest 1 View- PORTABLE-Urgent (Xray Chest 1 View- PORTABLE-Urgent .) (11.19.20 @ 23:53): Improvement in bilateral airspace disease with persistent bibasilar consolidation and small effusions.    11/4/20 : Xray Chest 1 View- PORTABLE-Routine (Xray Chest 1 View- PORTABLE-Routine in AM.) (11.04.20 @ 10:59) Reexpansion of the left lung with residual left pleural effusion and/or infiltrate.  Right pulmonary edema unchanged.  Tubes and catheters in satisfactory position.      10/25/20: Xray Chest 1 View- PORTABLE-Routine (Xray Chest 1 View- PORTABLE-Routine in AM.) (10.25.20 @ 09:21) Frontal expiratory view of the chest shows the heart to be similar in size. Endotracheal tube, right jugular line and feeding tube remain present. The lungs show similar left upper lobe infiltrate with progression of right perihilar infiltrate. Pleural effusions are similar. There is no evidence of pneumothorax.    10/21/20 : CT Abdomen and Pelvis w/ Oral Cont and w/ IV Cont (10.21.20 @ 15:59) Mural thickening of the left colon and rectum. Liquid stool in the colon. Apparent segmental mural thickening of the mid to distal small bowel and the proximal duodenum. Findings may represent nonspecific enterocolitis, noninfectious inflammatory bowel disease, or ischemic bowel. Clinical correlation is recommended. The celiac axis artery, SMA, and KINA are patent without stenosis.    Dilatation of the mid small bowel is again noted. Oral contrast has reached the terminal ileum. Findings may represent small bowel obstruction, or ileus related to nonspecific enterocolitis.   Cholelithiasis. Moderate to large ascites in the abdomen, increased since the previous examination. 5 mm nonspecific noncalcified left upper lobe lung nodule; if the patient's is in the high risk category (i.e. smoker), follow-up chest CT may be pursued in 12 months to ensure stability. Combination of atelectasis and consolidation in the left lower lobe.  Possible 1.0 cm hypodense lesion in the left lobe of the thyroid. Thyroid ultrasound may be pursued for further evaluation.   Mild bilateral pleural effusions.  Aging determinate compression fracture at T5 vertebra.        MICROBIOLOGY DATA:  MRSA/MSSA PCR (12.01.20 @ 01:16)   MRSA PCR Result.: Detected:    MRSA/MSSA PCR (11.28.20 @ 11:34)   MRSA PCR Result.: Detected:     Urine Microscopic-Add On (NC) (11.20.20 @ 04:12)   Red Blood Cell - Urine: 5-10 /HPF   White Blood Cell - Urine: 11-25 /HPF   Calcium Oxalate Crystals: Few /HPF   Bacteria: Moderate /HPF   Comment - Urine: few amorphous urates   Epithelial Cells: Few /HPF     Culture - Blood (11.19.20 @ 10:19)   Specimen Source: .Blood Blood-Peripheral   Culture Results: No growth to date.     Culture - Surgical Swab (11.12.20 @ 05:01)   - Ampicillin: R >8 Predicts results to ampicillin/sulbactam, amoxacillin-clavulanate and piperacillin-tazobactam.   - Levofloxacin: R >4   - Linezolid: S 1   - Tetra/Doxy: R >8   - Vancomycin: R >16   Specimen Source: .Surgical Swab Sacral decub   Culture Results:   Few Enterococcus faecium (vancomycin resistant)   Rare Candida albicans "Susceptibilities not performed"   Organism Identification: Enterococcus faecium (vancomycin resistant)   Organism: Enterococcus faecium (vancomycin resistant)   Method Type: STEWART     Culture - Surgical Swab (11.12.20 @ 05:01)   Specimen Source: .Surgical Swab Sacral decub   Culture Results:  Few Enterococcus faecium Susceptibility to follow.   Rare Candida albicans "Susceptibilities not performed"     Culture - Sputum . (10.26.20 @ 00:26)   - Ampicillin/Sulbactam: R <=8/4   - Cefazolin: R >16   - Ceftazidime: I 16   - Clindamycin: R >4   - Erythromycin: R >4   - Gentamicin: S <=1 Should not be used as monotherapy   - Levofloxacin: S <=0.5   - Linezolid: S 2   - Oxacillin: R >2   - Penicillin: R >8   - RIF- Rifampin: S <=1 Should not be used as monotherapy   - Tetra/Doxy: S <=1   - Trimethoprim/Sulfamethoxazole: S <=0.5/9.5   - Trimethoprim/Sulfamethoxazole: S <=0.5/9.5   - Vancomycin: S 1     MRSA/MSSA PCR (10.21.20 @ 09:41)   MRSA PCR Result.: Detected:       Culture - Blood (10.20.20 @ 22:09)   Specimen Source: .Blood Blood   Culture Results:  No growth to date.     Culture - Blood (10.20.20 @ 22:09)   Specimen Source: .Blood Blood   Culture Results:   No growth to date.     Culture - Blood in AM (10.16.20 @ 10:13)   Specimen Source: .Blood Blood-Peripheral   Culture Results: No growth to date.     Culture - Blood in AM (10.16.20 @ 10:13)   Specimen Source: .Blood Blood-Peripheral   Culture Results: No growth to date.     Culture - Blood (10.13.20 @ 22:23)   Growth in anaerobic bottle: Gram Negative Rods   Specimen Source: .Blood Blood-Peripheral   Organism: Blood Culture PCR   Culture Results: Growth in anaerobic bottle: Bacteroides fragilis   "Susceptibilities not performed"     Culture - Blood (10.13.20 @ 22:23)   Specimen Source: .Blood Blood-Peripheral   Culture Results:   No growth to date.          Patient is seen and examined at the bed side, remains afebrile. She is tolerating ABx well.      REVIEW OF SYSTEMS: All other review systems are negative      ALLERGIES: No Known Allergies      Vital Signs Last 24 Hrs  T(C): 37.7 (11 Dec 2020 13:06), Max: 37.7 (11 Dec 2020 13:06)  T(F): 99.9 (11 Dec 2020 13:06), Max: 99.9 (11 Dec 2020 13:06)  HR: 100 (11 Dec 2020 13:06) (98 - 114)  BP: 115/77 (11 Dec 2020 13:06) (115/77 - 117/78)  BP(mean): --  RR: 18 (11 Dec 2020 13:06) (18 - 18)  SpO2: 100% (11 Dec 2020 13:06) (94% - 100%)        PHYSICAL EXAM:  GENERAL: Not in distress, on oxygen via NC  CHEST/LUNG: Not using accessory muscles   HEART: s1 and s2 present  ABDOMEN:  Nontender and  Nondistended  EXTREMITIES: B/L UE edematous improved  CNS: Awake and Alert         LABS:                        8.0    7.91  )-----------( 316      ( 11 Dec 2020 06:51 )             27.2                9.9    9.41  )-----------( 269      ( 24 Nov 2020 06:38 )             31.5                           9.4    13.14 )-----------( 149      ( 14 Nov 2020 07:58 )             28.7         12-11    143  |  103  |  10  ----------------------------<  89  3.6   |  35<H>  |  0.56    Ca    8.4      11 Dec 2020 06:51  Phos  3.2     12-11  Mg     2.0     12-11    TPro  6.5  /  Alb  1.9<L>  /  TBili  1.2  /  DBili  x   /  AST  42<H>  /  ALT  34  /  AlkPhos  297<H>  12-11 11-06    138  |  104  |  37<H>  ----------------------------<  81  3.9   |  25  |  2.37<H>    Ca    8.1<L>      06 Nov 2020 06:20  Phos  3.4     11-06  Mg     2.0     11-06    TPro  5.1<L>  /  Alb  2.8<L>  /  TBili  9.2<H>  /  DBili  x   /  AST  233<H>  /  ALT  99<H>  /  AlkPhos  96  11-06      Vancomycin Level, Trough (11.07.20 @ 21:49)   Vancomycin Level, Trough: 16.1:     Vancomycin Level, Trough (11.07.20 @ 07:08) : 19.5  Vancomycin Level, Trough (11.06.20 @ 06:20) : 19.9:   Procalcitonin, Serum (11.28.20 @ 15:38) : 1.55:       MEDICATIONS  (STANDING):    ascorbic acid 500 milliGRAM(s) Oral two times a day  chlorhexidine 2% Cloths 1 Application(s) Topical daily  DAPTOmycin IVPB 350 milliGRAM(s) IV Intermittent every 24 hours  enoxaparin Injectable 50 milliGRAM(s) SubCutaneous daily  fluconAZOLE IVPB 200 milliGRAM(s) IV Intermittent every 24 hours  fluconAZOLE IVPB      furosemide    Tablet 20 milliGRAM(s) Oral daily  lactobacillus acidophilus 1 Tablet(s) Oral daily  multivitamin/minerals 1 Tablet(s) Oral daily  mupirocin 2% Ointment 1 Application(s) Topical every 12 hours  nystatin Powder 1 Application(s) Topical two times a day  pantoprazole   Suspension 40 milliGRAM(s) Enteral Tube daily  rifAXIMin 550 milliGRAM(s) Oral two times a day  zinc sulfate 220 milliGRAM(s) Oral daily      RADIOLOGY & ADDITIONAL TESTS:    12/9/20 : Xray Chest 1 View- PORTABLE-Routine (Xray Chest 1 View- PORTABLE-Routine in AM.) (12.09.20 @ 11:54) >  IMPRESSION: Persistent effusions left greater than right.      11/27/20 : Xray Chest 1 View- PORTABLE-Urgent (Xray Chest 1 View- PORTABLE-Urgent .) (11.27.20 @ 06:53) Increased interstitial lung markings with bilateral consolidations right greater than left. Small right pleural effusion. Overall worsening compared to prior exam.      11/24/20 : MR Pelvis Bony Only No Cont (11.24.20 @ 18:02) : Osteomyelitis of the lower sacrum and coccyx with overlying cutaneous ulceration,      11/19/20 : Xray Chest 1 View- PORTABLE-Urgent (Xray Chest 1 View- PORTABLE-Urgent .) (11.19.20 @ 23:53): Improvement in bilateral airspace disease with persistent bibasilar consolidation and small effusions.    11/4/20 : Xray Chest 1 View- PORTABLE-Routine (Xray Chest 1 View- PORTABLE-Routine in AM.) (11.04.20 @ 10:59) Reexpansion of the left lung with residual left pleural effusion and/or infiltrate.  Right pulmonary edema unchanged.  Tubes and catheters in satisfactory position.      10/25/20: Xray Chest 1 View- PORTABLE-Routine (Xray Chest 1 View- PORTABLE-Routine in AM.) (10.25.20 @ 09:21) Frontal expiratory view of the chest shows the heart to be similar in size. Endotracheal tube, right jugular line and feeding tube remain present. The lungs show similar left upper lobe infiltrate with progression of right perihilar infiltrate. Pleural effusions are similar. There is no evidence of pneumothorax.    10/21/20 : CT Abdomen and Pelvis w/ Oral Cont and w/ IV Cont (10.21.20 @ 15:59) Mural thickening of the left colon and rectum. Liquid stool in the colon. Apparent segmental mural thickening of the mid to distal small bowel and the proximal duodenum. Findings may represent nonspecific enterocolitis, noninfectious inflammatory bowel disease, or ischemic bowel. Clinical correlation is recommended. The celiac axis artery, SMA, and KINA are patent without stenosis.    Dilatation of the mid small bowel is again noted. Oral contrast has reached the terminal ileum. Findings may represent small bowel obstruction, or ileus related to nonspecific enterocolitis.   Cholelithiasis. Moderate to large ascites in the abdomen, increased since the previous examination. 5 mm nonspecific noncalcified left upper lobe lung nodule; if the patient's is in the high risk category (i.e. smoker), follow-up chest CT may be pursued in 12 months to ensure stability. Combination of atelectasis and consolidation in the left lower lobe.  Possible 1.0 cm hypodense lesion in the left lobe of the thyroid. Thyroid ultrasound may be pursued for further evaluation.   Mild bilateral pleural effusions.  Aging determinate compression fracture at T5 vertebra.        MICROBIOLOGY DATA:  MRSA/MSSA PCR (12.01.20 @ 01:16)   MRSA PCR Result.: Detected:    MRSA/MSSA PCR (11.28.20 @ 11:34)   MRSA PCR Result.: Detected:     Urine Microscopic-Add On (NC) (11.20.20 @ 04:12)   Red Blood Cell - Urine: 5-10 /HPF   White Blood Cell - Urine: 11-25 /HPF   Calcium Oxalate Crystals: Few /HPF   Bacteria: Moderate /HPF   Comment - Urine: few amorphous urates   Epithelial Cells: Few /HPF     Culture - Blood (11.19.20 @ 10:19)   Specimen Source: .Blood Blood-Peripheral   Culture Results: No growth to date.     Culture - Surgical Swab (11.12.20 @ 05:01)   - Ampicillin: R >8 Predicts results to ampicillin/sulbactam, amoxacillin-clavulanate and piperacillin-tazobactam.   - Levofloxacin: R >4   - Linezolid: S 1   - Tetra/Doxy: R >8   - Vancomycin: R >16   Specimen Source: .Surgical Swab Sacral decub   Culture Results:   Few Enterococcus faecium (vancomycin resistant)   Rare Candida albicans "Susceptibilities not performed"   Organism Identification: Enterococcus faecium (vancomycin resistant)   Organism: Enterococcus faecium (vancomycin resistant)   Method Type: STEWART     Culture - Surgical Swab (11.12.20 @ 05:01)   Specimen Source: .Surgical Swab Sacral decub   Culture Results:  Few Enterococcus faecium Susceptibility to follow.   Rare Candida albicans "Susceptibilities not performed"     Culture - Sputum . (10.26.20 @ 00:26)   - Ampicillin/Sulbactam: R <=8/4   - Cefazolin: R >16   - Ceftazidime: I 16   - Clindamycin: R >4   - Erythromycin: R >4   - Gentamicin: S <=1 Should not be used as monotherapy   - Levofloxacin: S <=0.5   - Linezolid: S 2   - Oxacillin: R >2   - Penicillin: R >8   - RIF- Rifampin: S <=1 Should not be used as monotherapy   - Tetra/Doxy: S <=1   - Trimethoprim/Sulfamethoxazole: S <=0.5/9.5   - Trimethoprim/Sulfamethoxazole: S <=0.5/9.5   - Vancomycin: S 1     MRSA/MSSA PCR (10.21.20 @ 09:41)   MRSA PCR Result.: Detected:       Culture - Blood (10.20.20 @ 22:09)   Specimen Source: .Blood Blood   Culture Results:  No growth to date.     Culture - Blood (10.20.20 @ 22:09)   Specimen Source: .Blood Blood   Culture Results:   No growth to date.     Culture - Blood in AM (10.16.20 @ 10:13)   Specimen Source: .Blood Blood-Peripheral   Culture Results: No growth to date.     Culture - Blood in AM (10.16.20 @ 10:13)   Specimen Source: .Blood Blood-Peripheral   Culture Results: No growth to date.     Culture - Blood (10.13.20 @ 22:23)   Growth in anaerobic bottle: Gram Negative Rods   Specimen Source: .Blood Blood-Peripheral   Organism: Blood Culture PCR   Culture Results: Growth in anaerobic bottle: Bacteroides fragilis   "Susceptibilities not performed"     Culture - Blood (10.13.20 @ 22:23)   Specimen Source: .Blood Blood-Peripheral   Culture Results:   No growth to date.

## 2020-12-11 NOTE — PHYSICAL THERAPY INITIAL EVALUATION ADULT - IMPAIRMENTS FOUND, PT EVAL
muscle strength/gait, locomotion, and balance/aerobic capacity/endurance
gait, locomotion, and balance/aerobic capacity/endurance
muscle strength/aerobic capacity/endurance/posture/ROM/gait, locomotion, and balance
posture/aerobic capacity/endurance/gait, locomotion, and balance/muscle strength/ROM

## 2020-12-11 NOTE — PHYSICAL THERAPY INITIAL EVALUATION ADULT - MANUAL MUSCLE TESTING RESULTS, REHAB EVAL
BUE at least 4/5 except Rt. shoulder 3-/5 and lt. shopulder 2+/5, b/l hips 2+/5 , knees 4+/5, ankles at least 3/5 functionally
BUE at least 4/5 within available range, b/l hips 2+/5 , knees 4+/5, ankles at least 3/5 functionally
grossly assessed 3+/5
grossly graded 2+/5 to both UE and LE

## 2020-12-11 NOTE — PHYSICAL THERAPY INITIAL EVALUATION ADULT - CRITERIA FOR SKILLED THERAPEUTIC INTERVENTIONS
functional limitations in following categories/rehab potential/anticipated equipment needs at discharge/predicted duration of therapy intervention/impairments found/risk reduction/prevention/therapy frequency/anticipated discharge recommendation
impairments found/functional limitations in following categories
anticipated discharge recommendation/impairments found/predicted duration of therapy intervention/functional limitations in following categories/risk reduction/prevention/rehab potential/therapy frequency
rehab potential/predicted duration of therapy intervention/therapy frequency/anticipated discharge recommendation/impairments found/risk reduction/prevention/functional limitations in following categories

## 2020-12-11 NOTE — PHYSICAL THERAPY INITIAL EVALUATION ADULT - LEVEL OF INDEPENDENCE: SUPINE/SIT, REHAB EVAL
moderate assist (50% patients effort)
moderate assist (50% patients effort)
minimum assist (75% patients effort)

## 2020-12-11 NOTE — PHYSICAL THERAPY INITIAL EVALUATION ADULT - LEVEL OF INDEPENDENCE: GAIT, REHAB EVAL
unable to perform/due to inc. weakness and low endurance
unable to perform/today due to gen weakness and low BP
moderate assist (50% patients effort)
moderate assist (50% patients effort)

## 2020-12-11 NOTE — PHYSICAL THERAPY INITIAL EVALUATION ADULT - LEVEL OF INDEPENDENCE: SIT/STAND, REHAB EVAL
unable to perform/due to inc. weakness
maximum assist (25% patients effort)
unable to perform/due to feeling very tired and low BP 81/54 sitting at EOB
maximum assist (25% patients effort)

## 2020-12-11 NOTE — PHYSICAL THERAPY INITIAL EVALUATION ADULT - ACTIVE RANGE OF MOTION EXAMINATION, REHAB EVAL
bilateral  lower extremity Active ROM was WFL (within functional limits)/b/l shoulder ~4/5 range, hips ~1/4 range against gravity/bilateral upper extremity Active ROM was WFL (within functional limits)
bilateral upper extremity Active ROM was WFL (within functional limits)/Lt. shoulder to ~75 deg flx. Rt shoulder ~100 deg flx. B/l hips ~1/4 range, knees and ankles WFL/bilateral  lower extremity Active ROM was WFL (within functional limits)
bilateral  lower extremity Active ROM was WFL (within functional limits)/grossly assessed WFL AROM/bilateral upper extremity Active ROM was WFL (within functional limits)

## 2020-12-11 NOTE — PHYSICAL THERAPY INITIAL EVALUATION ADULT - TRANSFER SKILLS, REHAB EVAL
needs device/rollator/independent
rollator/needs device/independent
needed assist
independent/rollator/needs device

## 2020-12-11 NOTE — PHYSICAL THERAPY INITIAL EVALUATION ADULT - TRANSFER SAFETY CONCERNS NOTED: SIT/STAND, REHAB EVAL
decreased weight-shifting ability/decreased balance during turns
decreased weight-shifting ability/decreased balance during turns

## 2020-12-11 NOTE — PHYSICAL THERAPY INITIAL EVALUATION ADULT - IMPAIRMENTS CONTRIBUTING IMPAIRED BED MOBILITY, REHAB EVAL
decreased strength/impaired postural control/impaired balance/decreased ROM
decreased ROM/impaired postural control/decreased strength/impaired balance
decreased strength

## 2020-12-11 NOTE — PHYSICAL THERAPY INITIAL EVALUATION ADULT - PLANNED THERAPY INTERVENTIONS, PT EVAL
balance training/gait training/strengthening/transfer training/bed mobility training
neuromuscular re-education/ROM/stretching/postural re-education/strengthening/bed mobility training/balance training/gait training/transfer training
bed mobility training/strengthening/neuromuscular re-education/postural re-education/ROM/transfer training/gait training/balance training/stretching

## 2020-12-11 NOTE — PHYSICAL THERAPY INITIAL EVALUATION ADULT - LEVEL OF INDEPENDENCE: STAIR NEGOTIATION, REHAB EVAL
Unable to safely assess patient on stairs at this time. Pt does not exhibit appropriate pre-requisite skills to safely negotiate unlevel surfaces due to (poor endurance,decreased muscle strength,decreased balance,decreased safety awareness) which will put patient at significant risks for falls and injury./unable to perform
Not assessed, pt. would be high risk for falls

## 2020-12-11 NOTE — PHYSICAL THERAPY INITIAL EVALUATION ADULT - ORIENTATION, REHAB EVAL
time/month and year but not day/place/person
time/person/place/month and year but not day
oriented to person, place, time and situation
oriented to person, place, time and situation

## 2020-12-11 NOTE — PHYSICAL THERAPY INITIAL EVALUATION ADULT - LEVEL OF INDEPENDENCE: STAND/SIT, REHAB EVAL
unable to perform/due to inc. weakness
maximum assist (25% patients effort)
maximum assist (25% patients effort)

## 2020-12-11 NOTE — PHYSICAL THERAPY INITIAL EVALUATION ADULT - LEVEL OF INDEPENDENCE, REHAB EVAL
minimum assist (75% patients effort)
minimum assist (75% patients effort)
maximum assist (25% patients effort)
contact guard

## 2020-12-11 NOTE — PHYSICAL THERAPY INITIAL EVALUATION ADULT - PHYSICAL ASSIST/NONPHYSICAL ASSIST, REHAB EVAL
verbal cues/nonverbal cues (demo/gestures)/1 person assist
verbal cues/nonverbal cues (demo/gestures)/1 person assist
verbal cues
1 person assist

## 2020-12-11 NOTE — PROGRESS NOTE ADULT - SUBJECTIVE AND OBJECTIVE BOX
Patient was seen and examined  Patient is a 65y old  Female who presents with a chief complaint of Hypotension (10 Dec 2020 10:53)      INTERVAL HPI/OVERNIGHT EVENTS:  T(C): 36.6 (12-11-20 @ 05:03), Max: 37.5 (12-10-20 @ 20:54)  HR: 99 (12-11-20 @ 05:03) (77 - 114)  BP: 116/78 (12-11-20 @ 05:03) (102/69 - 130/86)  RR: 18 (12-11-20 @ 05:03) (18 - 20)  SpO2: 94% (12-11-20 @ 05:03) (92% - 100%)  Wt(kg): --  I&O's Summary      LABS:                        8.0    7.91  )-----------( 316      ( 11 Dec 2020 06:51 )             27.2     12-11    143  |  103  |  10  ----------------------------<  89  3.6   |  35<H>  |  0.56    Ca    8.4      11 Dec 2020 06:51  Phos  3.2     12-11  Mg     2.0     12-11    TPro  6.5  /  Alb  1.9<L>  /  TBili  1.2  /  DBili  x   /  AST  42<H>  /  ALT  34  /  AlkPhos  297<H>  12-11        CAPILLARY BLOOD GLUCOSE      POCT Blood Glucose.: 106 mg/dL (11 Dec 2020 05:58)  POCT Blood Glucose.: 111 mg/dL (10 Dec 2020 23:24)  POCT Blood Glucose.: 101 mg/dL (10 Dec 2020 11:53)              MEDICATIONS  (STANDING):  ascorbic acid 500 milliGRAM(s) Oral two times a day  chlorhexidine 2% Cloths 1 Application(s) Topical daily  DAPTOmycin IVPB 350 milliGRAM(s) IV Intermittent every 24 hours  enoxaparin Injectable 50 milliGRAM(s) SubCutaneous daily  fluconAZOLE IVPB 200 milliGRAM(s) IV Intermittent every 24 hours  fluconAZOLE IVPB      furosemide    Tablet 20 milliGRAM(s) Oral daily  lactobacillus acidophilus 1 Tablet(s) Oral daily  multivitamin/minerals 1 Tablet(s) Oral daily  mupirocin 2% Ointment 1 Application(s) Topical every 12 hours  nystatin Powder 1 Application(s) Topical two times a day  pantoprazole   Suspension 40 milliGRAM(s) Enteral Tube daily  rifAXIMin 550 milliGRAM(s) Oral two times a day  zinc sulfate 220 milliGRAM(s) Oral daily    MEDICATIONS  (PRN):  acetaminophen   Tablet .. 650 milliGRAM(s) Oral every 6 hours PRN Temp greater or equal to 38C (100.4F), Mild Pain (1 - 3), Moderate Pain (4 - 6)  ALBUTerol    90 MICROgram(s) HFA Inhaler 2 Puff(s) Inhalation every 6 hours PRN Shortness of Breath and/or Wheezing  sodium chloride 0.9% lock flush 10 milliLiter(s) IV Push every 1 hour PRN Pre/post blood products, medications, blood draw, and to maintain line patency      RADIOLOGY & ADDITIONAL TESTS:    Imaging Personally Reviewed:  [ ] YES  [ ] NO    REVIEW OF SYSTEMS:  CONSTITUTIONAL: No fever, weight loss, or fatigue  EYES: No eye pain, visual disturbances, or discharge  ENMT:  No difficulty hearing, tinnitus, vertigo; No sinus or throat pain  NECK: No pain or stiffness  BREASTS: No pain, masses, or nipple discharge  RESPIRATORY: No cough, wheezing, chills or hemoptysis; No shortness of breath  CARDIOVASCULAR: No chest pain, palpitations, dizziness, or leg swelling  GASTROINTESTINAL: No abdominal or epigastric pain. No nausea, vomiting, or hematemesis; No diarrhea or constipation. No melena or hematochezia.  GENITOURINARY: No dysuria, frequency, hematuria, or incontinence  NEUROLOGICAL: No headaches, memory loss, loss of strength, numbness, or tremors  SKIN: No itching, burning, rashes, or lesions   LYMPH NODES: No enlarged glands  ENDOCRINE: No heat or cold intolerance; No hair loss  MUSCULOSKELETAL: No joint pain or swelling; No muscle, back, or extremity pain  PSYCHIATRIC: No depression, anxiety, mood swings, or difficulty sleeping  HEME/LYMPH: No easy bruising, or bleeding gums  ALLERY AND IMMUNOLOGIC: No hives or eczema      Consultant(s) Notes Reviewed:  [x] YES  [ ] NO    PHYSICAL EXAM:  GENERAL: NAD, well-groomed, well-developed  HEAD:  Atraumatic, Normocephalic  EYES: EOMI, PERRLA, conjunctiva and sclera clear  ENMT: No tonsillar erythema, exudates, or enlargement; Moist mucous membranes, Good dentition, No lesions  NECK: Supple, No JVD, Normal thyroid  NERVOUS SYSTEM:  Alert & Oriented X3, Good concentration; Motor Strength 5/5 B/L upper and lower extremities; DTRs 2+ intact and symmetric  CHEST/LUNG: Clear to percussion bilaterally; No rales, rhonchi, wheezing, or rubs  HEART: Regular rate and rhythm; No murmurs, rubs, or gallops  ABDOMEN: Soft, Nontender, Nondistended; Bowel sounds present  EXTREMITIES:  2+ Peripheral Pulses, No clubbing, cyanosis, or edema  LYMPH: No lymphadenopathy noted  SKIN: No rashes or lesions    Care Discussed with Consultants/Other Providers [ x] YES  [ ] NO

## 2020-12-11 NOTE — PHYSICAL THERAPY INITIAL EVALUATION ADULT - AMBULATION SKILLS, REHAB EVAL
needs device/independent/rollator
rollator/independent/needs device
RW/needs device and assist
independent/needs device/rollator

## 2020-12-12 NOTE — PROGRESS NOTE ADULT - PROBLEM SELECTOR PLAN 2
Echo shows improved EF   s/p IV Lasix 11/27 for pleural effusions  Currently appears euvolemic  Lasix po 20mg with hold parameters   Continue supportive care. Echo shows improved EF   s/p IV Lasix 11/27 for pleural effusions;   Currently appears euvolemic  Lasix po 20mg with hold parameters   Continue supportive care.

## 2020-12-12 NOTE — PROGRESS NOTE ADULT - PROBLEM SELECTOR PLAN 1
Bacteroides fragilis per BC on 10/13  Sputum positive for MRSA and Stenotrophomonas  Aspiration pneumonia vs HCAP .   Repeat BC 10/16 and 11/19 NGTD ,UC on 11/20 NGTD   Infected sacral ulcer- s/p debridement and culture grew Enterococcus (VRE) and Candida ( sensitivity is pending)  Leukocytosis resolved, Cdiff negative  MRI sacrum/pelvis shows OM   c/w abx per ID Dapto until 12/28. can switch to linezolid on 12/14 for 14 days.  `PICC  for extended abx (for total of 6 weeks, thru Dec 28th). Bacteroides fragilis per BC on 10/13  Sputum positive for MRSA and Stenotrophomonas  Aspiration pneumonia vs HCAP .   Repeat BC 10/16 and 11/19 NGTD ,UC on 11/20 NGTD   Infected sacral ulcer- s/p debridement and culture grew Enterococcus (VRE) and Candida  Leukocytosis resolved, Cdiff negative  MRI sacrum/pelvis shows Osteomyelitis  c/w abx per ID Dapto until 12/28. can switch to linezolid on 12/14 for 14 days.  `PICC  for extended abx (for total of 6 weeks, thru Dec 29th).  May remove PICC before discharge if transitioning at discharge with oral antibiotics.   Linezolid and Diflucan to continue till 12/29 per Dr. Patricio, ID

## 2020-12-12 NOTE — PROGRESS NOTE ADULT - PROBLEM SELECTOR PLAN 8
Needs Daptomycin until 12/29  Facilities will not give Dapto  Can switch to linezolid on 12/14-12/29.  Diflucan to continue till 12/29

## 2020-12-12 NOTE — PROGRESS NOTE ADULT - PROBLEM SELECTOR PLAN 9
DVT PPx: Lovenox  GI prophylaxis: Protonix  Needs antibiotics thru 12/29. Dapto thru 12/14. Linezolid 12/14-12/29  OOB daily. Continue PT.   Medically stable for discharge  DISPO: Insurance issues with facility acceptance; SW/CM sent info to daughter for facility preference; Pending decision    GOC: DNR with Trial Intubation

## 2020-12-12 NOTE — PROGRESS NOTE ADULT - PROBLEM SELECTOR PLAN 1
Bacteroides fragilis per BC on 10/13  Sputum positive for MRSA and Stenotrophomonas  Aspiration pneumonia vs HCAP .   Repeat BC 10/16 and 11/19 NGTD ,UC on 11/20 NGTD   Infected sacral ulcer- s/p debridement and culture grew Enterococcus (VRE) and Candida  Leukocytosis resolved, Cdiff negative  MRI sacrum/pelvis shows Osteomyelitis  c/w abx per ID Dapto until 12/28. can switch to linezolid on 12/14 for 14 days.  `PICC  for extended abx (for total of 6 weeks, thru Dec 29th).  May remove PICC before discharge if transitioning at discharge with oral antibiotics.   Linezolid and Diflucan to continue till 12/29 per Dr. Patricio, ID

## 2020-12-12 NOTE — PROGRESS NOTE ADULT - PROBLEM SELECTOR PLAN 8
Needs Daptomycin until 12/28  Facilities will not give Dapto  Can switch to linezolid on 12/14-12/28. Needs Daptomycin until 12/29  Facilities will not give Dapto  Can switch to linezolid on 12/14-12/29.  Diflucan to continue till 12/29

## 2020-12-12 NOTE — PROGRESS NOTE ADULT - ASSESSMENT
Patient is a 65y old  Female from home, lives with daughter, ambulates with walker with PMH of recurrent SBO's, s/p exp lap, SB resection in 2015, ex lap, ALBINA in 2018, DVT, PE, on Xarelto, IVC filter, chronic leg swelling, and anasarca, Now send in to the ER by her PCP, Dr. Ross, for evaluation of  generalized weakness and hypotension BP of 80/40 during office visit. On admission, she found to have no fever and BP was in lower normal range and no Leukocytosis. The CXR is clear and CT abd/pelvis consistent with Ileus. The ID consult requested to assist with evaluation of infectious etiology of episode of hypotension. Found to have Bacteroides bacteremia and now developed septic shock on Cefepime and Flagyl. Hence transferred to ICU. 10/20/20.    # Hypotension  at office- BP of 80/40 - resolved   #  Bacteroides fragilis Bacteremia - 10/13/20 - ? source most likely GI- Repeat Blood Cxs have NGTD 10/16/20  # Ileus  - h/o recurrent SBO and s/p EXp. LAp  # COVID 19 negative   # Septic shock ( Hypothermia + hypotensive)- transferred to ICU since requiring pressor- source GI, The CT abd/pelvis shows nonspecific enterocolitis, noninfectious inflammatory bowel disease, or ischemic bowel, along with ileus.   # Pneumonia- HCAP vs Aspiration - on CXR 10/24 and CT chest 10/21- sputum Cx grew Methicillin resistant Staphylococcus aureus  and Stenotrophomonas maltophilia.  # S/p extubated 11/9/20  # Infected sacral ulcer- s/p debridement and culture grew Enterococcus, VRE and Candida, sensitivity is pending- The MRI of pelvis shows  Osteomyelitis of the lower sacrum and coccyx with overlying cutaneous ulceration.   # Fever- 11/8 and 11/19- BCX NGTD 11/19 - The CXR shows Improvement in bilateral airspace disease with persistent bibasilar consolidation. She is s/p removal of velazquez catheter and passed TOV.  # HCAP- 11/27/20,  Intermittent fever and CXR shows  bilateral consolidations right greater than left. - The procalcitonin level is 1.55  # s/p PICC line placement 11/30  # Persistent effusions left greater than right      would recommend:    1. Monitor Temp. and c/w supportive care   2. Continue  Mupirocin ointment to apply to both nares since MRSA PCR is positive   3. Continue Daptomycin to cover VRE  and  Fluconazole to cover Candida in sacral wound culture X 6  weeks to treat Osteomyelitis,  might change to oral Linezolid  and oral fluconazole on discharge to continue until  12/29/20  4. Aspiration precaution  5. Frequent repositioning   6. OOB to chair     Attending Attestation:    Spent more than 45 minutes on total encounter, more than 50 % of the visit was spent counseling and/or coordinating care by the Attending physician.     Patient is a 65y old  Female from home, lives with daughter, ambulates with walker with PMH of recurrent SBO's, s/p exp lap, SB resection in 2015, ex lap, ALBINA in 2018, DVT, PE, on Xarelto, IVC filter, chronic leg swelling, and anasarca, Now send in to the ER by her PCP, Dr. Ross, for evaluation of  generalized weakness and hypotension BP of 80/40 during office visit. On admission, she found to have no fever and BP was in lower normal range and no Leukocytosis. The CXR is clear and CT abd/pelvis consistent with Ileus. The ID consult requested to assist with evaluation of infectious etiology of episode of hypotension. Found to have Bacteroides bacteremia and now developed septic shock on Cefepime and Flagyl. Hence transferred to ICU. 10/20/20.    # Hypotension  at office- BP of 80/40 - resolved   #  Bacteroides fragilis Bacteremia - 10/13/20 - ? source most likely GI- Repeat Blood Cxs have NGTD 10/16/20  # Ileus  - h/o recurrent SBO and s/p EXp. LAp  # COVID 19 negative   # Septic shock ( Hypothermia + hypotensive)- transferred to ICU since requiring pressor- source GI, The CT abd/pelvis shows nonspecific enterocolitis, noninfectious inflammatory bowel disease, or ischemic bowel, along with ileus.   # Pneumonia- HCAP vs Aspiration - on CXR 10/24 and CT chest 10/21- sputum Cx grew Methicillin resistant Staphylococcus aureus  and Stenotrophomonas maltophilia.  # S/p extubated 11/9/20  # Infected sacral ulcer- s/p debridement and culture grew Enterococcus, VRE and Candida, sensitivity is pending- The MRI of pelvis shows  Osteomyelitis of the lower sacrum and coccyx with overlying cutaneous ulceration.   # Fever- 11/8 and 11/19- BCX NGTD 11/19 - The CXR shows Improvement in bilateral airspace disease with persistent bibasilar consolidation. She is s/p removal of velazquez catheter and passed TOV.  # HCAP- 11/27/20,  Intermittent fever and CXR shows  bilateral consolidations right greater than left. - The procalcitonin level is 1.55  # s/p PICC line placement 11/30  # Persistent effusions left greater than right      would recommend:    1. Please discontinue DEEP/PICC lIne since spiking fever  2. Monitor Temp. and c/w supportive care   3. Continue  Mupirocin ointment to apply to both nares since MRSA PCR is positive   4. Continue Daptomycin to cover VRE  and  Fluconazole to cover Candida in sacral wound culture X 6  weeks to treat Osteomyelitis,  might change to oral Linezolid  and oral fluconazole on discharge to continue until  12/29/20  5. Aspiration precaution  6. Frequent repositioning       Attending Attestation:    Spent more than 45 minutes on total encounter, more than 50 % of the visit was spent counseling and/or coordinating care by the Attending physician.

## 2020-12-12 NOTE — PROGRESS NOTE ADULT - PROBLEM SELECTOR PLAN 9
DVT and GI prophylaxis  Continue rifaximin  Needs antibiotics thru 12/28. Dapto thru 12/14. Linezolid 12/14-12/28  OOB daily. Continue PT.   Medically stable for discharge DVT PPx: Lovenox  GI prophylaxis: Protonix  Needs antibiotics thru 12/29. Dapto thru 12/14. Linezolid 12/14-12/29  OOB daily. Continue PT.   Medically stable for discharge  DISPO: Insurance issues with facility acceptance; SW/CM sent info to daughter for facility preference; Pending decision    GOC: DNR with Trial Intubation

## 2020-12-12 NOTE — PROGRESS NOTE ADULT - PROBLEM SELECTOR PLAN 2
Echo shows improved EF   s/p IV Lasix 11/27 for pleural effusions;   Currently appears euvolemic  Lasix po 20mg with hold parameters   Continue supportive care.

## 2020-12-12 NOTE — PROGRESS NOTE ADULT - SUBJECTIVE AND OBJECTIVE BOX
MARIBETH PADILLA    SCU progress note    INTERVAL HPI/OVERNIGHT EVENTS: ***No overnight events    DNR [x ]   DNI  [  ]  TRIAL OF INTUBATION    Covid - 19 PCR: Negative 11/30    The 4Ms    What Matters Most: see [facility]  Age appropriate Medications/Screen for High Risk Medication: Yes  Mentation: see CAM below  Mobility: defer to physical exam    The Confusion Assessment Method (CAM) Diagnostic Algorithm     1: Acute Onset or Fluctuating Course  - Is there evidence of an acute change in mental status from the patient’s baseline? Did the (abnormal) behavior  fluctuate during the day, that is, tend to come and go, or increase and decrease in severity?  [ ] YES [x ] NO     2: Inattention  - Did the patient have difficulty focusing attention, being easily distractible, or having difficulty keeping track of what was being said?  [ ] YES [x ] NO     3: Disorganized thinking  -Was the patient’s thinking disorganized or incoherent, such as rambling or irrelevant conversation, unclear or illogical flow of ideas, or unpredictable switching from subject to subject?  [ ] YES [x ] NO    4: Altered Level of consciousness?  [ ] YES [x ] NO    The diagnosis of delirium by CAM requires the presence of features 1 and 2 and either 3 or 4.    PRESSORS: [ ] YES [x ] NO  DAPTOmycin IVPB 350 milliGRAM(s) IV Intermittent every 24 hours  fluconAZOLE IVPB 200 milliGRAM(s) IV Intermittent every 24 hours  fluconAZOLE IVPB      rifAXIMin 550 milliGRAM(s) Oral two times a day    Cardiovascular:  Heart Failure  Acute   Acute on Chronic  Chronic       furosemide    Tablet 20 milliGRAM(s) Oral daily    Pulmonary:  ALBUTerol    90 MICROgram(s) HFA Inhaler 2 Puff(s) Inhalation every 6 hours PRN    Hematalogic:  enoxaparin Injectable 50 milliGRAM(s) SubCutaneous daily    Other:  acetaminophen   Tablet .. 650 milliGRAM(s) Oral every 6 hours PRN  ascorbic acid 500 milliGRAM(s) Oral two times a day  chlorhexidine 2% Cloths 1 Application(s) Topical daily  lactobacillus acidophilus 1 Tablet(s) Oral daily  multivitamin/minerals 1 Tablet(s) Oral daily  mupirocin 2% Ointment 1 Application(s) Topical every 12 hours  nystatin Powder 1 Application(s) Topical two times a day  pantoprazole   Suspension 40 milliGRAM(s) Enteral Tube daily  sodium chloride 0.9% lock flush 10 milliLiter(s) IV Push every 1 hour PRN  zinc sulfate 220 milliGRAM(s) Oral daily    acetaminophen   Tablet .. 650 milliGRAM(s) Oral every 6 hours PRN  ALBUTerol    90 MICROgram(s) HFA Inhaler 2 Puff(s) Inhalation every 6 hours PRN  ascorbic acid 500 milliGRAM(s) Oral two times a day  chlorhexidine 2% Cloths 1 Application(s) Topical daily  DAPTOmycin IVPB 350 milliGRAM(s) IV Intermittent every 24 hours  enoxaparin Injectable 50 milliGRAM(s) SubCutaneous daily  fluconAZOLE IVPB 200 milliGRAM(s) IV Intermittent every 24 hours  fluconAZOLE IVPB      furosemide    Tablet 20 milliGRAM(s) Oral daily  lactobacillus acidophilus 1 Tablet(s) Oral daily  multivitamin/minerals 1 Tablet(s) Oral daily  mupirocin 2% Ointment 1 Application(s) Topical every 12 hours  nystatin Powder 1 Application(s) Topical two times a day  pantoprazole   Suspension 40 milliGRAM(s) Enteral Tube daily  rifAXIMin 550 milliGRAM(s) Oral two times a day  sodium chloride 0.9% lock flush 10 milliLiter(s) IV Push every 1 hour PRN  zinc sulfate 220 milliGRAM(s) Oral daily    Drug Dosing Weight  Height (cm): 167.6 (21 Oct 2020 02:26)  Weight (kg): 56.2 (21 Oct 2020 02:26)  BMI (kg/m2): 20 (21 Oct 2020 02:26)  BSA (m2): 1.63 (21 Oct 2020 02:26)    CENTRAL LINE: [ ] YES [x ] NO  LOCATION:   DATE INSERTED:  REMOVE: [ ] YES [ ] NO  EXPLAIN:    TREJO: [ ] YES [x ] NO    DATE INSERTED:  REMOVE:  [ ] YES [ ] NO  EXPLAIN:    PAST MEDICAL & SURGICAL HISTORY:  Anasarca    Pulmonary embolism    DVT (deep venous thrombosis)    SBO (small bowel obstruction)    H/O exploratory laparotomy                      PHYSICAL EXAM:    GENERAL: NAD, thin  HEAD:  Atraumatic, Normocephalic  EYES: EOMI, PERRLA, conjunctiva and sclera clear  ENMT: No tonsillar erythema, exudates  NECK: Supple, No JVD,  NERVOUS SYSTEM:  Alert & Oriented X1/2, Follows simple commands. Moving all extremities.  CHEST/LUNG: Diminished breath sounds bilateral bases  HEART: Regular rate and rhythm; No murmurs, rubs, or gallops  ABDOMEN: Soft, Nontender, Nondistended; Bowel sounds present  EXTREMITIES:  2+ Peripheral Pulses, No clubbing, cyanosis, or edema  LYMPH: No lymphadenopathy noted  SKIN: Unstageable sacrum      LABS:  CBC Full  -  ( 11 Dec 2020 06:51 )  WBC Count : 7.91 K/uL  RBC Count : 2.87 M/uL  Hemoglobin : 8.0 g/dL  Hematocrit : 27.2 %  Platelet Count - Automated : 316 K/uL  Mean Cell Volume : 94.8 fl  Mean Cell Hemoglobin : 27.9 pg  Mean Cell Hemoglobin Concentration : 29.4 gm/dL  Auto Neutrophil # : 5.45 K/uL  Auto Lymphocyte # : 1.65 K/uL  Auto Monocyte # : 0.54 K/uL  Auto Eosinophil # : 0.20 K/uL  Auto Basophil # : 0.04 K/uL  Auto Neutrophil % : 68.9 %  Auto Lymphocyte % : 20.9 %  Auto Monocyte % : 6.8 %  Auto Eosinophil % : 2.5 %  Auto Basophil % : 0.5 %    12-11    143  |  103  |  10  ----------------------------<  89  3.6   |  35<H>  |  0.56    Ca    8.4      11 Dec 2020 06:51  Phos  3.2     12-11  Mg     2.0     12-11    TPro  6.5  /  Alb  1.9<L>  /  TBili  1.2  /  DBili  x   /  AST  42<H>  /  ALT  34  /  AlkPhos  297<H>  12-11              [  ]  DVT Prophylaxis  [  ]  Nutrition, Brand, Rate         Goal Rate        Abnormal Nutritional Status -  Malnutrition   Cachexia      Morbid Obesity BMI >/=40    RADIOLOGY & ADDITIONAL STUDIES:  ***  < from: Xray Chest 1 View- PORTABLE-Routine (Xray Chest 1 View- PORTABLE-Routine in AM.) (12.09.20 @ 11:54) >  Heart is likely enlarged. Left PICCline remains.    There are sizable effusions left greater than right. Adjacent infiltrate is possible.    Noted is an IVC filter.    Allowing for technique, study is similar to December 8.    IMPRESSION: Persistent effusions left greater than right.    < end of copied text >    Goals of Care Discussion with Family/Proxy/Other   - see note from 11/18   MARIBETH PADILLA    SCU progress note    INTERVAL HPI/OVERNIGHT EVENTS: No overnight events. Continues on antibiotics, afebrile.     DNR [x ]   DNI  [  ]  TRIAL OF INTUBATION    Covid - 19 PCR:   COVID-19 PCR: NotDetec (30 Nov 2020 11:24)  COVID-19 PCR: NotDetec (17 Nov 2020 08:05)  SARS-CoV-2: NotDete (13 Oct 2020 16:00)      The 4Ms    What Matters Most: see [facility]  Age appropriate Medications/Screen for High Risk Medication: Yes  Mentation: see CAM below  Mobility: defer to physical exam    The Confusion Assessment Method (CAM) Diagnostic Algorithm     1: Acute Onset or Fluctuating Course  - Is there evidence of an acute change in mental status from the patient’s baseline? Did the (abnormal) behavior  fluctuate during the day, that is, tend to come and go, or increase and decrease in severity?  [ ] YES [x ] NO     2: Inattention  - Did the patient have difficulty focusing attention, being easily distractible, or having difficulty keeping track of what was being said?  [ ] YES [x ] NO     3: Disorganized thinking  -Was the patient’s thinking disorganized or incoherent, such as rambling or irrelevant conversation, unclear or illogical flow of ideas, or unpredictable switching from subject to subject?  [ ] YES [x ] NO    4: Altered Level of consciousness?  [ ] YES [x ] NO    The diagnosis of delirium by CAM requires the presence of features 1 and 2 and either 3 or 4.    PRESSORS: [ ] YES [x ] NO    MEDICATIONS  (STANDING):  ascorbic acid 500 milliGRAM(s) Oral two times a day  chlorhexidine 2% Cloths 1 Application(s) Topical daily  DAPTOmycin IVPB 350 milliGRAM(s) IV Intermittent every 24 hours  enoxaparin Injectable 50 milliGRAM(s) SubCutaneous daily  fluconAZOLE IVPB 200 milliGRAM(s) IV Intermittent every 24 hours  fluconAZOLE IVPB      furosemide    Tablet 20 milliGRAM(s) Oral daily  lactobacillus acidophilus 1 Tablet(s) Oral daily  multivitamin/minerals 1 Tablet(s) Oral daily  mupirocin 2% Ointment 1 Application(s) Topical every 12 hours  nystatin Powder 1 Application(s) Topical two times a day  pantoprazole   Suspension 40 milliGRAM(s) Enteral Tube daily  rifAXIMin 550 milliGRAM(s) Oral two times a day  zinc sulfate 220 milliGRAM(s) Oral daily    MEDICATIONS  (PRN):  acetaminophen   Tablet .. 650 milliGRAM(s) Oral every 6 hours PRN Temp greater or equal to 38C (100.4F), Mild Pain (1 - 3), Moderate Pain (4 - 6)  ALBUTerol    90 MICROgram(s) HFA Inhaler 2 Puff(s) Inhalation every 6 hours PRN Shortness of Breath and/or Wheezing  sodium chloride 0.9% lock flush 10 milliLiter(s) IV Push every 1 hour PRN Pre/post blood products, medications, blood draw, and to maintain line patency    Drug Dosing Weight  Height (cm): 167.6 (21 Oct 2020 02:26)  Weight (kg): 56.2 (21 Oct 2020 02:26)  BMI (kg/m2): 20 (21 Oct 2020 02:26)  BSA (m2): 1.63 (21 Oct 2020 02:26)  CENTRAL LINE: [ ] YES [x ] NO  LOCATION:   DATE INSERTED:  REMOVE: [ ] YES [ ] NO  EXPLAIN:    TREJO: [ ] YES [x ] NO    DATE INSERTED:  REMOVE:  [ ] YES [ ] NO  EXPLAIN:    PAST MEDICAL & SURGICAL HISTORY:  Anasarca    Pulmonary embolism    DVT (deep venous thrombosis)    SBO (small bowel obstruction)    H/O exploratory laparotomy        PHYSICAL EXAM:    GENERAL: NAD, thin  HEAD:  Atraumatic, Normocephalic  EYES: EOMI, PERRLA, conjunctiva and sclera clear  ENMT: No tonsillar erythema, exudates  NECK: Supple, No JVD,  NERVOUS SYSTEM:  Alert & Oriented X1/2, Follows simple commands. Moving all extremities.  CHEST/LUNG: Diminished breath sounds bilateral bases  HEART: Regular rate and rhythm; No murmurs, rubs, or gallops  ABDOMEN: Soft, Nontender, Nondistended; Bowel sounds present  EXTREMITIES:  2+ Peripheral Pulses, No clubbing, cyanosis, or edema  LYMPH: No lymphadenopathy noted  SKIN: Un-stageable sacral ulcer;      LABS:  CBC Full  -  ( 12 Dec 2020 06:49 )  WBC Count : 9.01 K/uL  RBC Count : 2.96 M/uL  Hemoglobin : 8.3 g/dL  Hematocrit : 28.3 %  Platelet Count - Automated : 319 K/uL  Mean Cell Volume : 95.6 fl  Mean Cell Hemoglobin : 28.0 pg  Mean Cell Hemoglobin Concentration : 29.3 gm/dL  Auto Neutrophil # : 6.41 K/uL  Auto Lymphocyte # : 1.71 K/uL  Auto Monocyte # : 0.66 K/uL  Auto Eosinophil # : 0.16 K/uL  Auto Basophil # : 0.04 K/uL  Auto Neutrophil % : 71.2 %  Auto Lymphocyte % : 19.0 %  Auto Monocyte % : 7.3 %  Auto Eosinophil % : 1.8 %  Auto Basophil % : 0.4 %    12-12    143  |  105  |  11  ----------------------------<  93  4.1   |  34<H>  |  0.60    Ca    8.4      12 Dec 2020 06:49  Phos  3.4     12-12  Mg     1.9     12-12    TPro  6.7  /  Alb  1.9<L>  /  TBili  1.4<H>  /  DBili  x   /  AST  49<H>  /  ALT  35  /  AlkPhos  333<H>  12-12    [ x ]  DVT Prophylaxis: Lovenox      RADIOLOGY & ADDITIONAL STUDIES: Reviewed  `12/9/20 : Xray Chest 1 View- PORTABLE-Routine (Xray Chest 1 View- PORTABLE-Routine in AM.) (12.09.20 @ 11:54) >  IMPRESSION: Persistent effusions left greater than right.      11/27/20 : Xray Chest 1 View- PORTABLE-Urgent (Xray Chest 1 View- PORTABLE-Urgent .) (11.27.20 @ 06:53) Increased interstitial lung markings with bilateral consolidations right greater than left. Small right pleural effusion. Overall worsening compared to prior exam.      11/24/20 : MR Pelvis Bony Only No Cont (11.24.20 @ 18:02) : Osteomyelitis of the lower sacrum and coccyx with overlying cutaneous ulceration,      11/19/20 : Xray Chest 1 View- PORTABLE-Urgent (Xray Chest 1 View- PORTABLE-Urgent .) (11.19.20 @ 23:53): Improvement in bilateral airspace disease with persistent bibasilar consolidation and small effusions.    11/4/20 : Xray Chest 1 View- PORTABLE-Routine (Xray Chest 1 View- PORTABLE-Routine in AM.) (11.04.20 @ 10:59) Reexpansion of the left lung with residual left pleural effusion and/or infiltrate.  Right pulmonary edema unchanged.  Tubes and catheters in satisfactory position.      10/25/20: Xray Chest 1 View- PORTABLE-Routine (Xray Chest 1 View- PORTABLE-Routine in AM.) (10.25.20 @ 09:21) Frontal expiratory view of the chest shows the heart to be similar in size. Endotracheal tube, right jugular line and feeding tube remain present. The lungs show similar left upper lobe infiltrate with progression of right perihilar infiltrate. Pleural effusions are similar. There is no evidence of pneumothorax.    10/21/20 : CT Abdomen and Pelvis w/ Oral Cont and w/ IV Cont (10.21.20 @ 15:59) Mural thickening of the left colon and rectum. Liquid stool in the colon. Apparent segmental mural thickening of the mid to distal small bowel and the proximal duodenum. Findings may represent nonspecific enterocolitis, noninfectious inflammatory bowel disease, or ischemic bowel. Clinical correlation is recommended. The celiac axis artery, SMA, and KINA are patent without stenosis.    Dilatation of the mid small bowel is again noted. Oral contrast has reached the terminal ileum. Findings may represent small bowel obstruction, or ileus related to nonspecific enterocolitis.   Cholelithiasis. Moderate to large ascites in the abdomen, increased since the previous examination. 5 mm nonspecific noncalcified left upper lobe lung nodule; if the patient's is in the high risk category (i.e. smoker), follow-up chest CT may be pursued in 12 months to ensure stability. Combination of atelectasis and consolidation in the left lower lobe.  Possible 1.0 cm hypodense lesion in the left lobe of the thyroid. Thyroid ultrasound may be pursued for further evaluation.   Mild bilateral pleural effusions.  Aging determinate compression fracture at T5 vertebra.        MICROBIOLOGY DATA:  MRSA/MSSA PCR (12.01.20 @ 01:16)   MRSA PCR Result.: Detected:    MRSA/MSSA PCR (11.28.20 @ 11:34)   MRSA PCR Result.: Detected:     Urine Microscopic-Add On (NC) (11.20.20 @ 04:12)   Red Blood Cell - Urine: 5-10 /HPF   White Blood Cell - Urine: 11-25 /HPF   Calcium Oxalate Crystals: Few /HPF   Bacteria: Moderate /HPF   Comment - Urine: few amorphous urates   Epithelial Cells: Few /HPF     Culture - Blood (11.19.20 @ 10:19)   Specimen Source: .Blood Blood-Peripheral   Culture Results: No growth to date.     Culture - Surgical Swab (11.12.20 @ 05:01)   - Ampicillin: R >8 Predicts results to ampicillin/sulbactam, amoxacillin-clavulanate and piperacillin-tazobactam.   - Levofloxacin: R >4   - Linezolid: S 1   - Tetra/Doxy: R >8   - Vancomycin: R >16   Specimen Source: .Surgical Swab Sacral decub   Culture Results:   Few Enterococcus faecium (vancomycin resistant)   Rare Candida albicans "Susceptibilities not performed"   Organism Identification: Enterococcus faecium (vancomycin resistant)   Organism: Enterococcus faecium (vancomycin resistant)   Method Type: STEWART     Culture - Surgical Swab (11.12.20 @ 05:01)   Specimen Source: .Surgical Swab Sacral decub   Culture Results:  Few Enterococcus faecium Susceptibility to follow.   Rare Candida albicans "Susceptibilities not performed"     Culture - Sputum . (10.26.20 @ 00:26)   - Ampicillin/Sulbactam: R <=8/4   - Cefazolin: R >16   - Ceftazidime: I 16   - Clindamycin: R >4   - Erythromycin: R >4   - Gentamicin: S <=1 Should not be used as monotherapy   - Levofloxacin: S <=0.5   - Linezolid: S 2   - Oxacillin: R >2   - Penicillin: R >8   - RIF- Rifampin: S <=1 Should not be used as monotherapy   - Tetra/Doxy: S <=1   - Trimethoprim/Sulfamethoxazole: S <=0.5/9.5   - Trimethoprim/Sulfamethoxazole: S <=0.5/9.5   - Vancomycin: S 1     MRSA/MSSA PCR (10.21.20 @ 09:41)   MRSA PCR Result.: Detected:       Culture - Blood (10.20.20 @ 22:09)   Specimen Source: .Blood Blood   Culture Results:  No growth to date.     Culture - Blood (10.20.20 @ 22:09)   Specimen Source: .Blood Blood   Culture Results:   No growth to date.     Culture - Blood in AM (10.16.20 @ 10:13)   Specimen Source: .Blood Blood-Peripheral   Culture Results: No growth to date.     Culture - Blood in AM (10.16.20 @ 10:13)   Specimen Source: .Blood Blood-Peripheral   Culture Results: No growth to date.     Culture - Blood (10.13.20 @ 22:23)   Growth in anaerobic bottle: Gram Negative Rods   Specimen Source: .Blood Blood-Peripheral   Organism: Blood Culture PCR   Culture Results: Growth in anaerobic bottle: Bacteroides fragilis   "Susceptibilities not performed"     Culture - Blood (10.13.20 @ 22:23)   Specimen Source: .Blood Blood-Peripheral   Culture Results:   No growth to date.       Goals of Care Discussion with Family/Proxy/Other   - see note from 11/18

## 2020-12-12 NOTE — PROGRESS NOTE ADULT - NUTRITIONAL ASSESSMENT
This patient has been assessed with a concern for Malnutrition and has been determined to have a diagnosis/diagnoses of Severe protein-calorie malnutrition.    This patient is being managed with:   Diet Dysphagia 1 Pureed-Thin Liquids-  Low Sodium  Supplement Feeding Modality:  Oral  Two Papi HN Cans or Servings Per Day:  2       Frequency:  Two Times a day  Ensure Pudding Cans or Servings Per Day:  2       Frequency:  Two Times a day  Entered: Dec  2 2020 11:57AM     This patient has been assessed with a concern for Malnutrition and has been determined to have a diagnosis/diagnoses of Severe protein-calorie malnutrition.    Diet, Dysphagia 1 Pureed-Thin Liquids:   Low Sodium  Supplement Feeding Modality:  Oral  Two Papi HN Cans or Servings Per Day:  2       Frequency:  Two Times a day  Ensure Pudding Cans or Servings Per Day:  2       Frequency:  Two Times a day (12-02-20 @ 11:57) [Active]

## 2020-12-13 NOTE — PROGRESS NOTE ADULT - SUBJECTIVE AND OBJECTIVE BOX
MARIBETH PADILLA    SCU progress note    INTERVAL HPI/OVERNIGHT EVENTS: No overnight events. Continues on antibiotics, afebrile.     DNR [x ]   DNI  [  ]  TRIAL OF INTUBATION    Covid - 19 PCR:   COVID-19 PCR: NotDetec (30 Nov 2020 11:24)  COVID-19 PCR: NotDetec (17 Nov 2020 08:05)  SARS-CoV-2: NotDete (13 Oct 2020 16:00)      The 4Ms    What Matters Most: see GOC  Age appropriate Medications/Screen for High Risk Medication: Yes  Mentation: see CAM below  Mobility: defer to physical exam    The Confusion Assessment Method (CAM) Diagnostic Algorithm     1: Acute Onset or Fluctuating Course  - Is there evidence of an acute change in mental status from the patient’s baseline? Did the (abnormal) behavior  fluctuate during the day, that is, tend to come and go, or increase and decrease in severity?  [ ] YES [x ] NO     2: Inattention  - Did the patient have difficulty focusing attention, being easily distractible, or having difficulty keeping track of what was being said?  [ ] YES [x ] NO     3: Disorganized thinking  -Was the patient’s thinking disorganized or incoherent, such as rambling or irrelevant conversation, unclear or illogical flow of ideas, or unpredictable switching from subject to subject?  [ ] YES [x ] NO    4: Altered Level of consciousness?  [ ] YES [x ] NO    The diagnosis of delirium by CAM requires the presence of features 1 and 2 and either 3 or 4.    PRESSORS: [ ] YES [x ] NO    MEDICATIONS  (STANDING):  ascorbic acid 500 milliGRAM(s) Oral two times a day  chlorhexidine 2% Cloths 1 Application(s) Topical daily  DAPTOmycin IVPB 350 milliGRAM(s) IV Intermittent every 24 hours  enoxaparin Injectable 50 milliGRAM(s) SubCutaneous daily  fluconAZOLE IVPB 200 milliGRAM(s) IV Intermittent every 24 hours  fluconAZOLE IVPB      furosemide    Tablet 20 milliGRAM(s) Oral daily  lactobacillus acidophilus 1 Tablet(s) Oral daily  multivitamin/minerals 1 Tablet(s) Oral daily  mupirocin 2% Ointment 1 Application(s) Topical every 12 hours  nystatin Powder 1 Application(s) Topical two times a day  pantoprazole   Suspension 40 milliGRAM(s) Enteral Tube daily  rifAXIMin 550 milliGRAM(s) Oral two times a day  zinc sulfate 220 milliGRAM(s) Oral daily    MEDICATIONS  (PRN):  acetaminophen   Tablet .. 650 milliGRAM(s) Oral every 6 hours PRN Temp greater or equal to 38C (100.4F), Mild Pain (1 - 3), Moderate Pain (4 - 6)  ALBUTerol    90 MICROgram(s) HFA Inhaler 2 Puff(s) Inhalation every 6 hours PRN Shortness of Breath and/or Wheezing  sodium chloride 0.9% lock flush 10 milliLiter(s) IV Push every 1 hour PRN Pre/post blood products, medications, blood draw, and to maintain line patency    Drug Dosing Weight  Height (cm): 167.6 (21 Oct 2020 02:26)  Weight (kg): 56.2 (21 Oct 2020 02:26)  BMI (kg/m2): 20 (21 Oct 2020 02:26)  BSA (m2): 1.63 (21 Oct 2020 02:26)  CENTRAL LINE: [ ] YES [x ] NO  LOCATION:   DATE INSERTED:  REMOVE: [ ] YES [ ] NO  EXPLAIN:    TREJO: [ ] YES [x ] NO    DATE INSERTED:  REMOVE:  [ ] YES [ ] NO  EXPLAIN:    PAST MEDICAL & SURGICAL HISTORY:  Anasarca    Pulmonary embolism    DVT (deep venous thrombosis)    SBO (small bowel obstruction)    H/O exploratory laparotomy        PHYSICAL EXAM:    GENERAL: NAD, thin  HEAD:  Atraumatic, Normocephalic  EYES: EOMI, PERRLA, conjunctiva and sclera clear  ENMT: No tonsillar erythema, exudates  NECK: Supple, No JVD,  NERVOUS SYSTEM:  Alert & Oriented X1/2, Follows simple commands. Moving all extremities.  CHEST/LUNG: Diminished breath sounds bilateral bases  HEART: Regular rate and rhythm; No murmurs, rubs, or gallops  ABDOMEN: Soft, Nontender, Nondistended; Bowel sounds present  EXTREMITIES:  2+ Peripheral Pulses, No clubbing, cyanosis, or edema  LYMPH: No lymphadenopathy noted  SKIN: Un-stageable sacral ulcer;      LABS:  CBC Full  -  ( 13 Dec 2020 06:54 )  WBC Count : 8.76 K/uL  RBC Count : 2.71 M/uL  Hemoglobin : 7.7 g/dL  Hematocrit : 25.7 %  Platelet Count - Automated : 294 K/uL  Mean Cell Volume : 94.8 fl  Mean Cell Hemoglobin : 28.4 pg  Mean Cell Hemoglobin Concentration : 30.0 gm/dL  Auto Neutrophil # : 6.36 K/uL  Auto Lymphocyte # : 1.38 K/uL  Auto Monocyte # : 0.68 K/uL  Auto Eosinophil # : 0.26 K/uL  Auto Basophil # : 0.04 K/uL  Auto Neutrophil % : 72.4 %  Auto Lymphocyte % : 15.8 %  Auto Monocyte % : 7.8 %  Auto Eosinophil % : 3.0 %  Auto Basophil % : 0.5 %    12-13    143  |  103  |  13  ----------------------------<  76  3.9   |  36<H>  |  0.61    Ca    8.3<L>      13 Dec 2020 06:54  Phos  3.1     12-13  Mg     1.8     12-13    TPro  6.3  /  Alb  1.8<L>  /  TBili  1.2  /  DBili  x   /  AST  43<H>  /  ALT  32  /  AlkPhos  279<H>  12-13    CAPILLARY BLOOD GLUCOSE      POCT Blood Glucose.: 90 mg/dL (13 Dec 2020 05:48)  POCT Blood Glucose.: 120 mg/dL (13 Dec 2020 00:03)    [ x ]  DVT Prophylaxis: Lovenox      RADIOLOGY & ADDITIONAL STUDIES: Reviewed  `12/9/20 : Xray Chest 1 View- PORTABLE-Routine (Xray Chest 1 View- PORTABLE-Routine in AM.) (12.09.20 @ 11:54) >  IMPRESSION: Persistent effusions left greater than right.    11/27/20 : Xray Chest 1 View- PORTABLE-Urgent (Xray Chest 1 View- PORTABLE-Urgent .) (11.27.20 @ 06:53) Increased interstitial lung markings with bilateral consolidations right greater than left. Small right pleural effusion. Overall worsening compared to prior exam.    11/24/20 : MR Pelvis Bony Only No Cont (11.24.20 @ 18:02) : Osteomyelitis of the lower sacrum and coccyx with overlying cutaneous ulceration,    11/19/20 : Xray Chest 1 View- PORTABLE-Urgent (Xray Chest 1 View- PORTABLE-Urgent .) (11.19.20 @ 23:53): Improvement in bilateral airspace disease with persistent bibasilar consolidation and small effusions.    11/4/20 : Xray Chest 1 View- PORTABLE-Routine (Xray Chest 1 View- PORTABLE-Routine in AM.) (11.04.20 @ 10:59) Reexpansion of the left lung with residual left pleural effusion and/or infiltrate.  Right pulmonary edema unchanged.  Tubes and catheters in satisfactory position.    10/25/20: Xray Chest 1 View- PORTABLE-Routine (Xray Chest 1 View- PORTABLE-Routine in AM.) (10.25.20 @ 09:21) Frontal expiratory view of the chest shows the heart to be similar in size. Endotracheal tube, right jugular line and feeding tube remain present. The lungs show similar left upper lobe infiltrate with progression of right perihilar infiltrate. Pleural effusions are similar. There is no evidence of pneumothorax.    10/21/20 : CT Abdomen and Pelvis w/ Oral Cont and w/ IV Cont (10.21.20 @ 15:59) Mural thickening of the left colon and rectum. Liquid stool in the colon. Apparent segmental mural thickening of the mid to distal small bowel and the proximal duodenum. Findings may represent nonspecific enterocolitis, noninfectious inflammatory bowel disease, or ischemic bowel. Clinical correlation is recommended. The celiac axis artery, SMA, and KINA are patent without stenosis.    Dilatation of the mid small bowel is again noted. Oral contrast has reached the terminal ileum. Findings may represent small bowel obstruction, or ileus related to nonspecific enterocolitis.   Cholelithiasis. Moderate to large ascites in the abdomen, increased since the previous examination. 5 mm nonspecific noncalcified left upper lobe lung nodule; if the patient's is in the high risk category (i.e. smoker), follow-up chest CT may be pursued in 12 months to ensure stability. Combination of atelectasis and consolidation in the left lower lobe.  Possible 1.0 cm hypodense lesion in the left lobe of the thyroid. Thyroid ultrasound may be pursued for further evaluation.   Mild bilateral pleural effusions.  Aging determinate compression fracture at T5 vertebra.      MICROBIOLOGY DATA:  MRSA/MSSA PCR (12.01.20 @ 01:16)   MRSA PCR Result.: Detected:    MRSA/MSSA PCR (11.28.20 @ 11:34)   MRSA PCR Result.: Detected:     Urine Microscopic-Add On (NC) (11.20.20 @ 04:12)   Red Blood Cell - Urine: 5-10 /HPF   White Blood Cell - Urine: 11-25 /HPF   Calcium Oxalate Crystals: Few /HPF   Bacteria: Moderate /HPF   Comment - Urine: few amorphous urates   Epithelial Cells: Few /HPF     Culture - Blood (11.19.20 @ 10:19)   Specimen Source: .Blood Blood-Peripheral   Culture Results: No growth to date.     Culture - Surgical Swab (11.12.20 @ 05:01)   - Ampicillin: R >8 Predicts results to ampicillin/sulbactam, amoxacillin-clavulanate and piperacillin-tazobactam.   - Levofloxacin: R >4   - Linezolid: S 1   - Tetra/Doxy: R >8   - Vancomycin: R >16   Specimen Source: .Surgical Swab Sacral decub   Culture Results:   Few Enterococcus faecium (vancomycin resistant)   Rare Candida albicans "Susceptibilities not performed"   Organism Identification: Enterococcus faecium (vancomycin resistant)   Organism: Enterococcus faecium (vancomycin resistant)   Method Type: STEWART     Culture - Surgical Swab (11.12.20 @ 05:01)   Specimen Source: .Surgical Swab Sacral decub   Culture Results:  Few Enterococcus faecium Susceptibility to follow.   Rare Candida albicans "Susceptibilities not performed"     Culture - Sputum . (10.26.20 @ 00:26)   - Ampicillin/Sulbactam: R <=8/4   - Cefazolin: R >16   - Ceftazidime: I 16   - Clindamycin: R >4   - Erythromycin: R >4   - Gentamicin: S <=1 Should not be used as monotherapy   - Levofloxacin: S <=0.5   - Linezolid: S 2   - Oxacillin: R >2   - Penicillin: R >8   - RIF- Rifampin: S <=1 Should not be used as monotherapy   - Tetra/Doxy: S <=1   - Trimethoprim/Sulfamethoxazole: S <=0.5/9.5   - Trimethoprim/Sulfamethoxazole: S <=0.5/9.5   - Vancomycin: S 1     MRSA/MSSA PCR (10.21.20 @ 09:41)   MRSA PCR Result.: Detected:       Culture - Blood (10.20.20 @ 22:09)   Specimen Source: .Blood Blood   Culture Results:  No growth to date.     Culture - Blood (10.20.20 @ 22:09)   Specimen Source: .Blood Blood   Culture Results:   No growth to date.     Culture - Blood in AM (10.16.20 @ 10:13)   Specimen Source: .Blood Blood-Peripheral   Culture Results: No growth to date.     Culture - Blood in AM (10.16.20 @ 10:13)   Specimen Source: .Blood Blood-Peripheral   Culture Results: No growth to date.     Culture - Blood (10.13.20 @ 22:23)   Growth in anaerobic bottle: Gram Negative Rods   Specimen Source: .Blood Blood-Peripheral   Organism: Blood Culture PCR   Culture Results: Growth in anaerobic bottle: Bacteroides fragilis   "Susceptibilities not performed"     Culture - Blood (10.13.20 @ 22:23)   Specimen Source: .Blood Blood-Peripheral   Culture Results:   No growth to date.       Goals of Care Discussion with Family/Proxy/Other   - see note from 11/18

## 2020-12-13 NOTE — PROGRESS NOTE ADULT - SUBJECTIVE AND OBJECTIVE BOX
Patient is seen and examined at the bed side, spiked fever earlier and currently is afebrile. The WBC count is normal.       REVIEW OF SYSTEMS: All other review systems are negative      ALLERGIES: No Known Allergies      Vital Signs Last 24 Hrs  T(C): 37 (13 Dec 2020 13:29), Max: 37 (13 Dec 2020 13:29)  T(F): 98.6 (13 Dec 2020 13:29), Max: 98.6 (13 Dec 2020 13:29)  HR: 105 (13 Dec 2020 13:29) (100 - 105)  BP: 107/73 (13 Dec 2020 13:29) (95/60 - 107/73)  BP(mean): --  RR: 18 (13 Dec 2020 13:29) (17 - 18)  SpO2: 99% (13 Dec 2020 13:29) (96% - 99%)      PHYSICAL EXAM:  GENERAL: Not in distress, on oxygen via NC  CHEST/LUNG: Not using accessory muscles   HEART: s1 and s2 present  ABDOMEN:  Nontender and  Nondistended  EXTREMITIES: B/L UE edematous improved  CNS: Awake and Alert         LABS:                        7.7    8.76  )-----------( 294      ( 13 Dec 2020 06:54 )             25.7                8.0    7.91  )-----------( 316      ( 11 Dec 2020 06:51 )             27.2                9.4    13.14 )-----------( 149      ( 14 Nov 2020 07:58 )             28.7       12-13    143  |  103  |  13  ----------------------------<  76  3.9   |  36<H>  |  0.61    Ca    8.3<L>      13 Dec 2020 06:54  Phos  3.1     12-13  Mg     1.8     12-13    TPro  6.3  /  Alb  1.8<L>  /  TBili  1.2  /  DBili  x   /  AST  43<H>  /  ALT  32  /  AlkPhos  279<H>  12-13 12-12    143  |  105  |  11  ----------------------------<  93  4.1   |  34<H>  |  0.60    Ca    8.4      12 Dec 2020 06:49  Phos  3.4     12-12  Mg     1.9     12-12    TPro  6.7  /  Alb  1.9<L>  /  TBili  1.4<H>  /  DBili  x   /  AST  49<H>  /  ALT  35  /  AlkPhos  333<H>  12-12    11-06    138  |  104  |  37<H>  ----------------------------<  81  3.9   |  25  |  2.37<H>    Ca    8.1<L>      06 Nov 2020 06:20  Phos  3.4     11-06  Mg     2.0     11-06    TPro  5.1<L>  /  Alb  2.8<L>  /  TBili  9.2<H>  /  DBili  x   /  AST  233<H>  /  ALT  99<H>  /  AlkPhos  96  11-06      Vancomycin Level, Trough (11.07.20 @ 21:49)   Vancomycin Level, Trough: 16.1:     Vancomycin Level, Trough (11.07.20 @ 07:08) : 19.5  Vancomycin Level, Trough (11.06.20 @ 06:20) : 19.9:   Procalcitonin, Serum (11.28.20 @ 15:38) : 1.55:       MEDICATIONS  (STANDING):    ascorbic acid 500 milliGRAM(s) Oral two times a day  chlorhexidine 2% Cloths 1 Application(s) Topical daily  DAPTOmycin IVPB 350 milliGRAM(s) IV Intermittent every 24 hours  enoxaparin Injectable 50 milliGRAM(s) SubCutaneous daily  fluconAZOLE IVPB 200 milliGRAM(s) IV Intermittent every 24 hours  fluconAZOLE IVPB      furosemide    Tablet 20 milliGRAM(s) Oral daily  lactobacillus acidophilus 1 Tablet(s) Oral daily  multivitamin/minerals 1 Tablet(s) Oral daily  mupirocin 2% Ointment 1 Application(s) Topical every 12 hours  nystatin Powder 1 Application(s) Topical two times a day  pantoprazole   Suspension 40 milliGRAM(s) Enteral Tube daily  rifAXIMin 550 milliGRAM(s) Oral two times a day  zinc sulfate 220 milliGRAM(s) Oral daily        RADIOLOGY & ADDITIONAL TESTS:    12/9/20 : Xray Chest 1 View- PORTABLE-Routine (Xray Chest 1 View- PORTABLE-Routine in AM.) (12.09.20 @ 11:54) >  IMPRESSION: Persistent effusions left greater than right.      11/27/20 : Xray Chest 1 View- PORTABLE-Urgent (Xray Chest 1 View- PORTABLE-Urgent .) (11.27.20 @ 06:53) Increased interstitial lung markings with bilateral consolidations right greater than left. Small right pleural effusion. Overall worsening compared to prior exam.      11/24/20 : MR Pelvis Bony Only No Cont (11.24.20 @ 18:02) : Osteomyelitis of the lower sacrum and coccyx with overlying cutaneous ulceration,      11/19/20 : Xray Chest 1 View- PORTABLE-Urgent (Xray Chest 1 View- PORTABLE-Urgent .) (11.19.20 @ 23:53): Improvement in bilateral airspace disease with persistent bibasilar consolidation and small effusions.    11/4/20 : Xray Chest 1 View- PORTABLE-Routine (Xray Chest 1 View- PORTABLE-Routine in AM.) (11.04.20 @ 10:59) Reexpansion of the left lung with residual left pleural effusion and/or infiltrate.  Right pulmonary edema unchanged.  Tubes and catheters in satisfactory position.      10/25/20: Xray Chest 1 View- PORTABLE-Routine (Xray Chest 1 View- PORTABLE-Routine in AM.) (10.25.20 @ 09:21) Frontal expiratory view of the chest shows the heart to be similar in size. Endotracheal tube, right jugular line and feeding tube remain present. The lungs show similar left upper lobe infiltrate with progression of right perihilar infiltrate. Pleural effusions are similar. There is no evidence of pneumothorax.    10/21/20 : CT Abdomen and Pelvis w/ Oral Cont and w/ IV Cont (10.21.20 @ 15:59) Mural thickening of the left colon and rectum. Liquid stool in the colon. Apparent segmental mural thickening of the mid to distal small bowel and the proximal duodenum. Findings may represent nonspecific enterocolitis, noninfectious inflammatory bowel disease, or ischemic bowel. Clinical correlation is recommended. The celiac axis artery, SMA, and KINA are patent without stenosis.    Dilatation of the mid small bowel is again noted. Oral contrast has reached the terminal ileum. Findings may represent small bowel obstruction, or ileus related to nonspecific enterocolitis.   Cholelithiasis. Moderate to large ascites in the abdomen, increased since the previous examination. 5 mm nonspecific noncalcified left upper lobe lung nodule; if the patient's is in the high risk category (i.e. smoker), follow-up chest CT may be pursued in 12 months to ensure stability. Combination of atelectasis and consolidation in the left lower lobe.  Possible 1.0 cm hypodense lesion in the left lobe of the thyroid. Thyroid ultrasound may be pursued for further evaluation.   Mild bilateral pleural effusions.  Aging determinate compression fracture at T5 vertebra.        MICROBIOLOGY DATA:  MRSA/MSSA PCR (12.01.20 @ 01:16)   MRSA PCR Result.: Detected:    MRSA/MSSA PCR (11.28.20 @ 11:34)   MRSA PCR Result.: Detected:     Urine Microscopic-Add On (NC) (11.20.20 @ 04:12)   Red Blood Cell - Urine: 5-10 /HPF   White Blood Cell - Urine: 11-25 /HPF   Calcium Oxalate Crystals: Few /HPF   Bacteria: Moderate /HPF   Comment - Urine: few amorphous urates   Epithelial Cells: Few /HPF     Culture - Blood (11.19.20 @ 10:19)   Specimen Source: .Blood Blood-Peripheral   Culture Results: No growth to date.     Culture - Surgical Swab (11.12.20 @ 05:01)   - Ampicillin: R >8 Predicts results to ampicillin/sulbactam, amoxacillin-clavulanate and piperacillin-tazobactam.   - Levofloxacin: R >4   - Linezolid: S 1   - Tetra/Doxy: R >8   - Vancomycin: R >16   Specimen Source: .Surgical Swab Sacral decub   Culture Results:   Few Enterococcus faecium (vancomycin resistant)   Rare Candida albicans "Susceptibilities not performed"   Organism Identification: Enterococcus faecium (vancomycin resistant)   Organism: Enterococcus faecium (vancomycin resistant)   Method Type: STEWART     Culture - Surgical Swab (11.12.20 @ 05:01)   Specimen Source: .Surgical Swab Sacral decub   Culture Results:  Few Enterococcus faecium Susceptibility to follow.   Rare Candida albicans "Susceptibilities not performed"     Culture - Sputum . (10.26.20 @ 00:26)   - Ampicillin/Sulbactam: R <=8/4   - Cefazolin: R >16   - Ceftazidime: I 16   - Clindamycin: R >4   - Erythromycin: R >4   - Gentamicin: S <=1 Should not be used as monotherapy   - Levofloxacin: S <=0.5   - Linezolid: S 2   - Oxacillin: R >2   - Penicillin: R >8   - RIF- Rifampin: S <=1 Should not be used as monotherapy   - Tetra/Doxy: S <=1   - Trimethoprim/Sulfamethoxazole: S <=0.5/9.5   - Trimethoprim/Sulfamethoxazole: S <=0.5/9.5   - Vancomycin: S 1     MRSA/MSSA PCR (10.21.20 @ 09:41)   MRSA PCR Result.: Detected:       Culture - Blood (10.20.20 @ 22:09)   Specimen Source: .Blood Blood   Culture Results:  No growth to date.     Culture - Blood (10.20.20 @ 22:09)   Specimen Source: .Blood Blood   Culture Results:   No growth to date.     Culture - Blood in AM (10.16.20 @ 10:13)   Specimen Source: .Blood Blood-Peripheral   Culture Results: No growth to date.     Culture - Blood in AM (10.16.20 @ 10:13)   Specimen Source: .Blood Blood-Peripheral   Culture Results: No growth to date.     Culture - Blood (10.13.20 @ 22:23)   Growth in anaerobic bottle: Gram Negative Rods   Specimen Source: .Blood Blood-Peripheral   Organism: Blood Culture PCR   Culture Results: Growth in anaerobic bottle: Bacteroides fragilis   "Susceptibilities not performed"     Culture - Blood (10.13.20 @ 22:23)   Specimen Source: .Blood Blood-Peripheral   Culture Results:   No growth to date.              Patient is seen and examined at the bed side, spiked fever earlier and currently is afebrile. She is off supplemental oxygen.       REVIEW OF SYSTEMS: All other review systems are negative      ALLERGIES: No Known Allergies      Vital Signs Last 24 Hrs  T(C): 37 (13 Dec 2020 13:29), Max: 37 (13 Dec 2020 13:29)  T(F): 98.6 (13 Dec 2020 13:29), Max: 98.6 (13 Dec 2020 13:29)  HR: 105 (13 Dec 2020 13:29) (100 - 105)  BP: 107/73 (13 Dec 2020 13:29) (95/60 - 107/73)  BP(mean): --  RR: 18 (13 Dec 2020 13:29) (17 - 18)  SpO2: 99% (13 Dec 2020 13:29) (96% - 99%)      PHYSICAL EXAM:  GENERAL: Not in distress, off oxygen   CHEST/LUNG: Not using accessory muscles   HEART: s1 and s2 present  ABDOMEN:  Nontender and  Nondistended  EXTREMITIES: B/L UE edematous improved  CNS: Awake and Alert         LABS:                        7.7    8.76  )-----------( 294      ( 13 Dec 2020 06:54 )             25.7                8.0    7.91  )-----------( 316      ( 11 Dec 2020 06:51 )             27.2                9.4    13.14 )-----------( 149      ( 14 Nov 2020 07:58 )             28.7       12-13    143  |  103  |  13  ----------------------------<  76  3.9   |  36<H>  |  0.61    Ca    8.3<L>      13 Dec 2020 06:54  Phos  3.1     12-13  Mg     1.8     12-13    TPro  6.3  /  Alb  1.8<L>  /  TBili  1.2  /  DBili  x   /  AST  43<H>  /  ALT  32  /  AlkPhos  279<H>  12-13 11-06    138  |  104  |  37<H>  ----------------------------<  81  3.9   |  25  |  2.37<H>    Ca    8.1<L>      06 Nov 2020 06:20  Phos  3.4     11-06  Mg     2.0     11-06    TPro  5.1<L>  /  Alb  2.8<L>  /  TBili  9.2<H>  /  DBili  x   /  AST  233<H>  /  ALT  99<H>  /  AlkPhos  96  11-06      Vancomycin Level, Trough (11.07.20 @ 21:49)   Vancomycin Level, Trough: 16.1:     Vancomycin Level, Trough (11.07.20 @ 07:08) : 19.5  Vancomycin Level, Trough (11.06.20 @ 06:20) : 19.9:   Procalcitonin, Serum (11.28.20 @ 15:38) : 1.55:       MEDICATIONS  (STANDING):    ascorbic acid 500 milliGRAM(s) Oral two times a day  chlorhexidine 2% Cloths 1 Application(s) Topical daily  DAPTOmycin IVPB 350 milliGRAM(s) IV Intermittent every 24 hours  enoxaparin Injectable 50 milliGRAM(s) SubCutaneous daily  fluconAZOLE IVPB 200 milliGRAM(s) IV Intermittent every 24 hours  fluconAZOLE IVPB      furosemide    Tablet 20 milliGRAM(s) Oral daily  lactobacillus acidophilus 1 Tablet(s) Oral daily  multivitamin/minerals 1 Tablet(s) Oral daily  mupirocin 2% Ointment 1 Application(s) Topical every 12 hours  nystatin Powder 1 Application(s) Topical two times a day  pantoprazole   Suspension 40 milliGRAM(s) Enteral Tube daily  rifAXIMin 550 milliGRAM(s) Oral two times a day  zinc sulfate 220 milliGRAM(s) Oral daily        RADIOLOGY & ADDITIONAL TESTS:    12/9/20 : Xray Chest 1 View- PORTABLE-Routine (Xray Chest 1 View- PORTABLE-Routine in AM.) (12.09.20 @ 11:54) >  IMPRESSION: Persistent effusions left greater than right.      11/27/20 : Xray Chest 1 View- PORTABLE-Urgent (Xray Chest 1 View- PORTABLE-Urgent .) (11.27.20 @ 06:53) Increased interstitial lung markings with bilateral consolidations right greater than left. Small right pleural effusion. Overall worsening compared to prior exam.      11/24/20 : MR Pelvis Bony Only No Cont (11.24.20 @ 18:02) : Osteomyelitis of the lower sacrum and coccyx with overlying cutaneous ulceration,      11/19/20 : Xray Chest 1 View- PORTABLE-Urgent (Xray Chest 1 View- PORTABLE-Urgent .) (11.19.20 @ 23:53): Improvement in bilateral airspace disease with persistent bibasilar consolidation and small effusions.    11/4/20 : Xray Chest 1 View- PORTABLE-Routine (Xray Chest 1 View- PORTABLE-Routine in AM.) (11.04.20 @ 10:59) Reexpansion of the left lung with residual left pleural effusion and/or infiltrate.  Right pulmonary edema unchanged.  Tubes and catheters in satisfactory position.      10/25/20: Xray Chest 1 View- PORTABLE-Routine (Xray Chest 1 View- PORTABLE-Routine in AM.) (10.25.20 @ 09:21) Frontal expiratory view of the chest shows the heart to be similar in size. Endotracheal tube, right jugular line and feeding tube remain present. The lungs show similar left upper lobe infiltrate with progression of right perihilar infiltrate. Pleural effusions are similar. There is no evidence of pneumothorax.    10/21/20 : CT Abdomen and Pelvis w/ Oral Cont and w/ IV Cont (10.21.20 @ 15:59) Mural thickening of the left colon and rectum. Liquid stool in the colon. Apparent segmental mural thickening of the mid to distal small bowel and the proximal duodenum. Findings may represent nonspecific enterocolitis, noninfectious inflammatory bowel disease, or ischemic bowel. Clinical correlation is recommended. The celiac axis artery, SMA, and KINA are patent without stenosis.    Dilatation of the mid small bowel is again noted. Oral contrast has reached the terminal ileum. Findings may represent small bowel obstruction, or ileus related to nonspecific enterocolitis.   Cholelithiasis. Moderate to large ascites in the abdomen, increased since the previous examination. 5 mm nonspecific noncalcified left upper lobe lung nodule; if the patient's is in the high risk category (i.e. smoker), follow-up chest CT may be pursued in 12 months to ensure stability. Combination of atelectasis and consolidation in the left lower lobe.  Possible 1.0 cm hypodense lesion in the left lobe of the thyroid. Thyroid ultrasound may be pursued for further evaluation.   Mild bilateral pleural effusions.  Aging determinate compression fracture at T5 vertebra.        MICROBIOLOGY DATA:  MRSA/MSSA PCR (12.01.20 @ 01:16)   MRSA PCR Result.: Detected:    MRSA/MSSA PCR (11.28.20 @ 11:34)   MRSA PCR Result.: Detected:     Urine Microscopic-Add On (NC) (11.20.20 @ 04:12)   Red Blood Cell - Urine: 5-10 /HPF   White Blood Cell - Urine: 11-25 /HPF   Calcium Oxalate Crystals: Few /HPF   Bacteria: Moderate /HPF   Comment - Urine: few amorphous urates   Epithelial Cells: Few /HPF     Culture - Blood (11.19.20 @ 10:19)   Specimen Source: .Blood Blood-Peripheral   Culture Results: No growth to date.     Culture - Surgical Swab (11.12.20 @ 05:01)   - Ampicillin: R >8 Predicts results to ampicillin/sulbactam, amoxacillin-clavulanate and piperacillin-tazobactam.   - Levofloxacin: R >4   - Linezolid: S 1   - Tetra/Doxy: R >8   - Vancomycin: R >16   Specimen Source: .Surgical Swab Sacral decub   Culture Results:   Few Enterococcus faecium (vancomycin resistant)   Rare Candida albicans "Susceptibilities not performed"   Organism Identification: Enterococcus faecium (vancomycin resistant)   Organism: Enterococcus faecium (vancomycin resistant)   Method Type: STEWART     Culture - Surgical Swab (11.12.20 @ 05:01)   Specimen Source: .Surgical Swab Sacral decub   Culture Results:  Few Enterococcus faecium Susceptibility to follow.   Rare Candida albicans "Susceptibilities not performed"     Culture - Sputum . (10.26.20 @ 00:26)   - Ampicillin/Sulbactam: R <=8/4   - Cefazolin: R >16   - Ceftazidime: I 16   - Clindamycin: R >4   - Erythromycin: R >4   - Gentamicin: S <=1 Should not be used as monotherapy   - Levofloxacin: S <=0.5   - Linezolid: S 2   - Oxacillin: R >2   - Penicillin: R >8   - RIF- Rifampin: S <=1 Should not be used as monotherapy   - Tetra/Doxy: S <=1   - Trimethoprim/Sulfamethoxazole: S <=0.5/9.5   - Trimethoprim/Sulfamethoxazole: S <=0.5/9.5   - Vancomycin: S 1     MRSA/MSSA PCR (10.21.20 @ 09:41)   MRSA PCR Result.: Detected:       Culture - Blood (10.20.20 @ 22:09)   Specimen Source: .Blood Blood   Culture Results:  No growth to date.     Culture - Blood (10.20.20 @ 22:09)   Specimen Source: .Blood Blood   Culture Results:   No growth to date.     Culture - Blood in AM (10.16.20 @ 10:13)   Specimen Source: .Blood Blood-Peripheral   Culture Results: No growth to date.     Culture - Blood in AM (10.16.20 @ 10:13)   Specimen Source: .Blood Blood-Peripheral   Culture Results: No growth to date.     Culture - Blood (10.13.20 @ 22:23)   Growth in anaerobic bottle: Gram Negative Rods   Specimen Source: .Blood Blood-Peripheral   Organism: Blood Culture PCR   Culture Results: Growth in anaerobic bottle: Bacteroides fragilis   "Susceptibilities not performed"     Culture - Blood (10.13.20 @ 22:23)   Specimen Source: .Blood Blood-Peripheral   Culture Results:   No growth to date.

## 2020-12-13 NOTE — PROGRESS NOTE ADULT - SUBJECTIVE AND OBJECTIVE BOX
Patient was seen and examined  Patient is a 65y old  Female who presents with a chief complaint of Hypotension (13 Dec 2020 08:35)      INTERVAL HPI/OVERNIGHT EVENTS:  T(C): 37 (12-13-20 @ 13:29), Max: 37.1 (12-12-20 @ 15:47)  HR: 105 (12-13-20 @ 13:29) (100 - 105)  BP: 107/73 (12-13-20 @ 13:29) (95/60 - 107/73)  RR: 18 (12-13-20 @ 13:29) (17 - 18)  SpO2: 99% (12-13-20 @ 13:29) (96% - 99%)  Wt(kg): --  I&O's Summary      LABS:                        7.7    8.76  )-----------( 294      ( 13 Dec 2020 06:54 )             25.7     12-13    143  |  103  |  13  ----------------------------<  76  3.9   |  36<H>  |  0.61    Ca    8.3<L>      13 Dec 2020 06:54  Phos  3.1     12-13  Mg     1.8     12-13    TPro  6.3  /  Alb  1.8<L>  /  TBili  1.2  /  DBili  x   /  AST  43<H>  /  ALT  32  /  AlkPhos  279<H>  12-13        CAPILLARY BLOOD GLUCOSE      POCT Blood Glucose.: 90 mg/dL (13 Dec 2020 05:48)  POCT Blood Glucose.: 120 mg/dL (13 Dec 2020 00:03)              MEDICATIONS  (STANDING):  ascorbic acid 500 milliGRAM(s) Oral two times a day  chlorhexidine 2% Cloths 1 Application(s) Topical daily  DAPTOmycin IVPB 350 milliGRAM(s) IV Intermittent every 24 hours  enoxaparin Injectable 50 milliGRAM(s) SubCutaneous daily  fluconAZOLE IVPB 200 milliGRAM(s) IV Intermittent every 24 hours  fluconAZOLE IVPB      furosemide    Tablet 20 milliGRAM(s) Oral daily  lactobacillus acidophilus 1 Tablet(s) Oral daily  multivitamin/minerals 1 Tablet(s) Oral daily  mupirocin 2% Ointment 1 Application(s) Topical every 12 hours  nystatin Powder 1 Application(s) Topical two times a day  pantoprazole   Suspension 40 milliGRAM(s) Enteral Tube daily  rifAXIMin 550 milliGRAM(s) Oral two times a day  zinc sulfate 220 milliGRAM(s) Oral daily    MEDICATIONS  (PRN):  acetaminophen   Tablet .. 650 milliGRAM(s) Oral every 6 hours PRN Temp greater or equal to 38C (100.4F), Mild Pain (1 - 3), Moderate Pain (4 - 6)  ALBUTerol    90 MICROgram(s) HFA Inhaler 2 Puff(s) Inhalation every 6 hours PRN Shortness of Breath and/or Wheezing  sodium chloride 0.9% lock flush 10 milliLiter(s) IV Push every 1 hour PRN Pre/post blood products, medications, blood draw, and to maintain line patency      RADIOLOGY & ADDITIONAL TESTS:    Imaging Personally Reviewed:  [ ] YES  [ ] NO    REVIEW OF SYSTEMS:  CONSTITUTIONAL: No fever, weight loss, or fatigue  EYES: No eye pain, visual disturbances, or discharge  ENMT:  No difficulty hearing, tinnitus, vertigo; No sinus or throat pain  NECK: No pain or stiffness  BREASTS: No pain, masses, or nipple discharge  RESPIRATORY: No cough, wheezing, chills or hemoptysis; No shortness of breath  CARDIOVASCULAR: No chest pain, palpitations, dizziness, or leg swelling  GASTROINTESTINAL: No abdominal or epigastric pain. No nausea, vomiting, or hematemesis; No diarrhea or constipation. No melena or hematochezia.  GENITOURINARY: No dysuria, frequency, hematuria, or incontinence  NEUROLOGICAL: No headaches, memory loss, loss of strength, numbness, or tremors  SKIN: No itching, burning, rashes, or lesions   LYMPH NODES: No enlarged glands  ENDOCRINE: No heat or cold intolerance; No hair loss  MUSCULOSKELETAL: No joint pain or swelling; No muscle, back, or extremity pain  PSYCHIATRIC: No depression, anxiety, mood swings, or difficulty sleeping  HEME/LYMPH: No easy bruising, or bleeding gums  ALLERY AND IMMUNOLOGIC: No hives or eczema      Consultant(s) Notes Reviewed:  [ x ] YES  [ ] NO    PHYSICAL EXAM:  GENERAL: NAD, well-groomed, well-developed  HEAD:  Atraumatic, Normocephalic  EYES: EOMI, PERRLA, conjunctiva and sclera clear  ENMT: No tonsillar erythema, exudates, or enlargement; Moist mucous membranes, Good dentition, No lesions  NECK: Supple, No JVD, Normal thyroid  NERVOUS SYSTEM:  Alert & Oriented X3, Good concentration; Motor Strength 5/5 B/L upper and lower extremities; DTRs 2+ intact and symmetric  CHEST/LUNG: Clear to percussion bilaterally; No rales, rhonchi, wheezing, or rubs  HEART: Regular rate and rhythm; No murmurs, rubs, or gallops  ABDOMEN: Soft, Nontender, Nondistended; Bowel sounds present  EXTREMITIES:  2+ Peripheral Pulses, No clubbing, cyanosis, or edema  LYMPH: No lymphadenopathy noted  SKIN: No rashes or lesions    Care Discussed with Consultants/Other Providers [ x] YES  [ ] NO

## 2020-12-13 NOTE — PROGRESS NOTE ADULT - ASSESSMENT
Patient is a 65y old  Female from home, lives with daughter, ambulates with walker with PMH of recurrent SBO's, s/p exp lap, SB resection in 2015, ex lap, ALBINA in 2018, DVT, PE, on Xarelto, IVC filter, chronic leg swelling, and anasarca, Now send in to the ER by her PCP, Dr. Ross, for evaluation of  generalized weakness and hypotension BP of 80/40 during office visit. On admission, she found to have no fever and BP was in lower normal range and no Leukocytosis. The CXR is clear and CT abd/pelvis consistent with Ileus. The ID consult requested to assist with evaluation of infectious etiology of episode of hypotension. Found to have Bacteroides bacteremia and now developed septic shock on Cefepime and Flagyl. Hence transferred to ICU. 10/20/20.    # Hypotension  at office- BP of 80/40 - resolved   #  Bacteroides fragilis Bacteremia - 10/13/20 - ? source most likely GI- Repeat Blood Cxs have NGTD 10/16/20  # Ileus  - h/o recurrent SBO and s/p EXp. LAp  # COVID 19 negative   # Septic shock ( Hypothermia + hypotensive)- transferred to ICU since requiring pressor- source GI, The CT abd/pelvis shows nonspecific enterocolitis, noninfectious inflammatory bowel disease, or ischemic bowel, along with ileus.   # Pneumonia- HCAP vs Aspiration - on CXR 10/24 and CT chest 10/21- sputum Cx grew Methicillin resistant Staphylococcus aureus  and Stenotrophomonas maltophilia.  # S/p extubated 11/9/20  # Infected sacral ulcer- s/p debridement and culture grew Enterococcus, VRE and Candida, sensitivity is pending- The MRI of pelvis shows  Osteomyelitis of the lower sacrum and coccyx with overlying cutaneous ulceration.   # Fever- 11/8 and 11/19- BCX NGTD 11/19 - The CXR shows Improvement in bilateral airspace disease with persistent bibasilar consolidation. She is s/p removal of velazquez catheter and passed TOV.  # HCAP- 11/27/20,  Intermittent fever and CXR shows  bilateral consolidations right greater than left. - The procalcitonin level is 1.55  # s/p PICC line placement 11/30  # Persistent effusions left greater than right      would recommend:    1. Please discontinue DEEP/PICC lIne since spiking fever  2. Monitor Temp. and c/w supportive care   3. Continue  Mupirocin ointment to apply to both nares since MRSA PCR is positive   4. Continue Daptomycin to cover VRE  and  Fluconazole to cover Candida in sacral wound culture X 6  weeks to treat Osteomyelitis,  might change to oral Linezolid  and oral fluconazole on discharge to continue until  12/29/20  5. Aspiration precaution  6. Frequent repositioning       Attending Attestation:    Spent more than 45 minutes on total encounter, more than 50 % of the visit was spent counseling and/or coordinating care by the Attending physician.

## 2020-12-14 NOTE — PROGRESS NOTE ADULT - PROBLEM SELECTOR PROBLEM 1
Septic shock MD Sandhu:  patient seen and evaluated personally.   I agree with the History & Physical,  Impression & Plan other than what was detailed in my note.  MD Sandhu    46 y/o f hx of htn, takes valsartan, did not take today, hx of alcohol abuse, dt's, withdrawal seizures, brought in by ems as fiance found she was drinking (reports six years sober but states drinking past 1.5 years without people knowing) and was concerned about her. Pt not forthcoming with info. report of empty vodka bottles. pt reports drinking 8 0z vodka but will not divuldge last drink. Pt denies f/c, n/v, abd pain, recent illness, cp, sob, seizures. does not feel she is in withdrawal. afebrile tachy and htn, pt appears mildly intoxicated but does have slight hand tremors, no tongue fasiculatinos, no nystagmus, pt denies abd pain but when I press in epigastric area does appear to have ttp, she denies pain there. reports mainly drinking not eating, does appear mildly tachypnic, denies si/hi. pt states does not want to be here. htn may be d/t missing meds, vs alcohol withdrawal, also tachycardiaa possible anxiety, dehydration, alcohol withdrawal, acidosis. plan to give po valium in order to stave off vs treat withdrawal, high risk can tolerate small dose. labs screen for aka, pancreatitis, alchol, salicylate, apap, ekg. re evaluate.

## 2020-12-14 NOTE — PROGRESS NOTE ADULT - SUBJECTIVE AND OBJECTIVE BOX
Patient is seen and examined at the bed side,  is afebrile for last 24 hours. The Abx switched to oral and she is tolerating.        REVIEW OF SYSTEMS: All other review systems are negative      ALLERGIES: No Known Allergies      Vital Signs Last 24 Hrs  T(C): 37.8 (14 Dec 2020 12:00), Max: 37.8 (14 Dec 2020 12:00)  T(F): 100.1 (14 Dec 2020 12:00), Max: 100.1 (14 Dec 2020 12:00)  HR: 103 (14 Dec 2020 12:00) (103 - 107)  BP: 111/71 (14 Dec 2020 12:00) (100/58 - 111/71)  BP(mean): --  RR: 20 (14 Dec 2020 12:00) (16 - 20)  SpO2: 98% (14 Dec 2020 12:00) (95% - 98%)      PHYSICAL EXAM:  GENERAL: Not in distress, off oxygen   CHEST/LUNG: Not using accessory muscles   HEART: s1 and s2 present  ABDOMEN:  Nontender and  Nondistended  EXTREMITIES: B/L UE edematous improved  CNS: Awake and Alert         LABS:                        8.0    10.27 )-----------( 314      ( 14 Dec 2020 08:36 )             26.8                  8.0    7.91  )-----------( 316      ( 11 Dec 2020 06:51 )             27.2                9.4    13.14 )-----------( 149      ( 14 Nov 2020 07:58 )             28.7       12-14    142  |  102  |  13  ----------------------------<  77  3.6   |  33<H>  |  0.58    Ca    8.5      14 Dec 2020 08:36  Phos  3.7     12-14  Mg     1.9     12-14    TPro  6.6  /  Alb  1.7<L>  /  TBili  1.3<H>  /  DBili  x   /  AST  40  /  ALT  30  /  AlkPhos  276<H>  12-14 11-06    138  |  104  |  37<H>  ----------------------------<  81  3.9   |  25  |  2.37<H>    Ca    8.1<L>      06 Nov 2020 06:20  Phos  3.4     11-06  Mg     2.0     11-06    TPro  5.1<L>  /  Alb  2.8<L>  /  TBili  9.2<H>  /  DBili  x   /  AST  233<H>  /  ALT  99<H>  /  AlkPhos  96  11-06      Vancomycin Level, Trough (11.07.20 @ 21:49)   Vancomycin Level, Trough: 16.1:     Vancomycin Level, Trough (11.07.20 @ 07:08) : 19.5  Vancomycin Level, Trough (11.06.20 @ 06:20) : 19.9:   Procalcitonin, Serum (11.28.20 @ 15:38) : 1.55:       MEDICATIONS  (STANDING):    ascorbic acid 500 milliGRAM(s) Oral two times a day  chlorhexidine 2% Cloths 1 Application(s) Topical daily  enoxaparin Injectable 50 milliGRAM(s) SubCutaneous daily  fluconAZOLE   Tablet 200 milliGRAM(s) Oral daily  furosemide    Tablet 20 milliGRAM(s) Oral daily  lactobacillus acidophilus 1 Tablet(s) Oral daily  linezolid    Tablet 600 milliGRAM(s) Oral every 12 hours  multivitamin/minerals 1 Tablet(s) Oral daily  mupirocin 2% Ointment 1 Application(s) Topical every 12 hours  nystatin Powder 1 Application(s) Topical two times a day  pantoprazole   Suspension 40 milliGRAM(s) Enteral Tube daily  rifAXIMin 550 milliGRAM(s) Oral two times a day  zinc sulfate 220 milliGRAM(s) Oral daily        RADIOLOGY & ADDITIONAL TESTS:    12/9/20 : Xray Chest 1 View- PORTABLE-Routine (Xray Chest 1 View- PORTABLE-Routine in AM.) (12.09.20 @ 11:54) >  IMPRESSION: Persistent effusions left greater than right.      11/27/20 : Xray Chest 1 View- PORTABLE-Urgent (Xray Chest 1 View- PORTABLE-Urgent .) (11.27.20 @ 06:53) Increased interstitial lung markings with bilateral consolidations right greater than left. Small right pleural effusion. Overall worsening compared to prior exam.      11/24/20 : MR Pelvis Bony Only No Cont (11.24.20 @ 18:02) : Osteomyelitis of the lower sacrum and coccyx with overlying cutaneous ulceration,      11/19/20 : Xray Chest 1 View- PORTABLE-Urgent (Xray Chest 1 View- PORTABLE-Urgent .) (11.19.20 @ 23:53): Improvement in bilateral airspace disease with persistent bibasilar consolidation and small effusions.    11/4/20 : Xray Chest 1 View- PORTABLE-Routine (Xray Chest 1 View- PORTABLE-Routine in AM.) (11.04.20 @ 10:59) Reexpansion of the left lung with residual left pleural effusion and/or infiltrate.  Right pulmonary edema unchanged.  Tubes and catheters in satisfactory position.      10/25/20: Xray Chest 1 View- PORTABLE-Routine (Xray Chest 1 View- PORTABLE-Routine in AM.) (10.25.20 @ 09:21) Frontal expiratory view of the chest shows the heart to be similar in size. Endotracheal tube, right jugular line and feeding tube remain present. The lungs show similar left upper lobe infiltrate with progression of right perihilar infiltrate. Pleural effusions are similar. There is no evidence of pneumothorax.    10/21/20 : CT Abdomen and Pelvis w/ Oral Cont and w/ IV Cont (10.21.20 @ 15:59) Mural thickening of the left colon and rectum. Liquid stool in the colon. Apparent segmental mural thickening of the mid to distal small bowel and the proximal duodenum. Findings may represent nonspecific enterocolitis, noninfectious inflammatory bowel disease, or ischemic bowel. Clinical correlation is recommended. The celiac axis artery, SMA, and KINA are patent without stenosis.    Dilatation of the mid small bowel is again noted. Oral contrast has reached the terminal ileum. Findings may represent small bowel obstruction, or ileus related to nonspecific enterocolitis.   Cholelithiasis. Moderate to large ascites in the abdomen, increased since the previous examination. 5 mm nonspecific noncalcified left upper lobe lung nodule; if the patient's is in the high risk category (i.e. smoker), follow-up chest CT may be pursued in 12 months to ensure stability. Combination of atelectasis and consolidation in the left lower lobe.  Possible 1.0 cm hypodense lesion in the left lobe of the thyroid. Thyroid ultrasound may be pursued for further evaluation.   Mild bilateral pleural effusions.  Aging determinate compression fracture at T5 vertebra.        MICROBIOLOGY DATA:  MRSA/MSSA PCR (12.01.20 @ 01:16)   MRSA PCR Result.: Detected:    MRSA/MSSA PCR (11.28.20 @ 11:34)   MRSA PCR Result.: Detected:     Urine Microscopic-Add On (NC) (11.20.20 @ 04:12)   Red Blood Cell - Urine: 5-10 /HPF   White Blood Cell - Urine: 11-25 /HPF   Calcium Oxalate Crystals: Few /HPF   Bacteria: Moderate /HPF   Comment - Urine: few amorphous urates   Epithelial Cells: Few /HPF     Culture - Blood (11.19.20 @ 10:19)   Specimen Source: .Blood Blood-Peripheral   Culture Results: No growth to date.     Culture - Surgical Swab (11.12.20 @ 05:01)   - Ampicillin: R >8 Predicts results to ampicillin/sulbactam, amoxacillin-clavulanate and piperacillin-tazobactam.   - Levofloxacin: R >4   - Linezolid: S 1   - Tetra/Doxy: R >8   - Vancomycin: R >16   Specimen Source: .Surgical Swab Sacral decub   Culture Results:   Few Enterococcus faecium (vancomycin resistant)   Rare Candida albicans "Susceptibilities not performed"   Organism Identification: Enterococcus faecium (vancomycin resistant)   Organism: Enterococcus faecium (vancomycin resistant)   Method Type: STEWART     Culture - Surgical Swab (11.12.20 @ 05:01)   Specimen Source: .Surgical Swab Sacral decub   Culture Results:  Few Enterococcus faecium Susceptibility to follow.   Rare Candida albicans "Susceptibilities not performed"     Culture - Sputum . (10.26.20 @ 00:26)   - Ampicillin/Sulbactam: R <=8/4   - Cefazolin: R >16   - Ceftazidime: I 16   - Clindamycin: R >4   - Erythromycin: R >4   - Gentamicin: S <=1 Should not be used as monotherapy   - Levofloxacin: S <=0.5   - Linezolid: S 2   - Oxacillin: R >2   - Penicillin: R >8   - RIF- Rifampin: S <=1 Should not be used as monotherapy   - Tetra/Doxy: S <=1   - Trimethoprim/Sulfamethoxazole: S <=0.5/9.5   - Trimethoprim/Sulfamethoxazole: S <=0.5/9.5   - Vancomycin: S 1     MRSA/MSSA PCR (10.21.20 @ 09:41)   MRSA PCR Result.: Detected:       Culture - Blood (10.20.20 @ 22:09)   Specimen Source: .Blood Blood   Culture Results:  No growth to date.     Culture - Blood (10.20.20 @ 22:09)   Specimen Source: .Blood Blood   Culture Results:   No growth to date.     Culture - Blood in AM (10.16.20 @ 10:13)   Specimen Source: .Blood Blood-Peripheral   Culture Results: No growth to date.     Culture - Blood in AM (10.16.20 @ 10:13)   Specimen Source: .Blood Blood-Peripheral   Culture Results: No growth to date.     Culture - Blood (10.13.20 @ 22:23)   Growth in anaerobic bottle: Gram Negative Rods   Specimen Source: .Blood Blood-Peripheral   Organism: Blood Culture PCR   Culture Results: Growth in anaerobic bottle: Bacteroides fragilis   "Susceptibilities not performed"     Culture - Blood (10.13.20 @ 22:23)   Specimen Source: .Blood Blood-Peripheral   Culture Results:   No growth to date.

## 2020-12-14 NOTE — PROGRESS NOTE ADULT - ASSESSMENT
Patient is a 65y old  Female from home, lives with daughter, ambulates with walker with PMH of recurrent SBO's, s/p exp lap, SB resection in 2015, ex lap, ALBINA in 2018, DVT, PE, on Xarelto, IVC filter, chronic leg swelling, and anasarca, Now send in to the ER by her PCP, Dr. Ross, for evaluation of  generalized weakness and hypotension BP of 80/40 during office visit. On admission, she found to have no fever and BP was in lower normal range and no Leukocytosis. The CXR is clear and CT abd/pelvis consistent with Ileus. The ID consult requested to assist with evaluation of infectious etiology of episode of hypotension. Found to have Bacteroides bacteremia and now developed septic shock on Cefepime and Flagyl. Hence transferred to ICU. 10/20/20.    # Hypotension  at office- BP of 80/40 - resolved   #  Bacteroides fragilis Bacteremia - 10/13/20 - ? source most likely GI- Repeat Blood Cxs have NGTD 10/16/20  # Ileus  - h/o recurrent SBO and s/p EXp. LAp  # COVID 19 negative   # Septic shock ( Hypothermia + hypotensive)- transferred to ICU since requiring pressor- source GI, The CT abd/pelvis shows nonspecific enterocolitis, noninfectious inflammatory bowel disease, or ischemic bowel, along with ileus.   # Pneumonia- HCAP vs Aspiration - on CXR 10/24 and CT chest 10/21- sputum Cx grew Methicillin resistant Staphylococcus aureus  and Stenotrophomonas maltophilia.  # S/p extubated 11/9/20  # Infected sacral ulcer- s/p debridement and culture grew Enterococcus, VRE and Candida, sensitivity is pending- The MRI of pelvis shows  Osteomyelitis of the lower sacrum and coccyx with overlying cutaneous ulceration.   # Fever- 11/8 and 11/19- BCX NGTD 11/19 - The CXR shows Improvement in bilateral airspace disease with persistent bibasilar consolidation. She is s/p removal of velazquez catheter and passed TOV.  # HCAP- 11/27/20,  Intermittent fever and CXR shows  bilateral consolidations right greater than left. - The procalcitonin level is 1.55  # s/p PICC line placement 11/30  # Persistent effusions left greater than right      would recommend:    1. Please discontinue DEEP/PICC lIne since tolerating oral Abx  2. Monitor Temp. and c/w supportive care   3. Continue oral Linezolid  and oral fluconazole until  12/29/20  4. Aspiration precaution  5. Frequent repositioning     d/w Covering NPCammie    Attending Attestation:    Spent more than 45 minutes on total encounter, more than 50 % of the visit was spent counseling and/or coordinating care by the Attending physician.

## 2020-12-14 NOTE — PROGRESS NOTE ADULT - SUBJECTIVE AND OBJECTIVE BOX
MARIBETH PADILLA    SCU progress note    INTERVAL HPI/OVERNIGHT EVENTS: ***No overnight events.    DNR [x ]   DNI  [  ]   TRIAL OF INTUBATION    Covid - 19 PCR: Negative 11/30    The 4Ms    What Matters Most: see [facility]  Age appropriate Medications/Screen for High Risk Medication: Yes  Mentation: see CAM below  Mobility: defer to physical exam    The Confusion Assessment Method (CAM) Diagnostic Algorithm     1: Acute Onset or Fluctuating Course  - Is there evidence of an acute change in mental status from the patient’s baseline? Did the (abnormal) behavior  fluctuate during the day, that is, tend to come and go, or increase and decrease in severity?  [ ] YES [x ] NO     2: Inattention  - Did the patient have difficulty focusing attention, being easily distractible, or having difficulty keeping track of what was being said?  [ ] YES [x ] NO     3: Disorganized thinking  -Was the patient’s thinking disorganized or incoherent, such as rambling or irrelevant conversation, unclear or illogical flow of ideas, or unpredictable switching from subject to subject?  [ ] YES [x ] NO    4: Altered Level of consciousness?  [ ] YES [x ] NO    The diagnosis of delirium by CAM requires the presence of features 1 and 2 and either 3 or 4.    PRESSORS: [ ] YES [x ] NO  DAPTOmycin IVPB 350 milliGRAM(s) IV Intermittent every 24 hours  fluconAZOLE IVPB 200 milliGRAM(s) IV Intermittent every 24 hours  fluconAZOLE IVPB      rifAXIMin 550 milliGRAM(s) Oral two times a day    Cardiovascular:  Heart Failure  Acute   Acute on Chronic  Chronic       furosemide    Tablet 20 milliGRAM(s) Oral daily    Pulmonary:  ALBUTerol    90 MICROgram(s) HFA Inhaler 2 Puff(s) Inhalation every 6 hours PRN    Hematalogic:  enoxaparin Injectable 50 milliGRAM(s) SubCutaneous daily    Other:  acetaminophen   Tablet .. 650 milliGRAM(s) Oral every 6 hours PRN  ascorbic acid 500 milliGRAM(s) Oral two times a day  chlorhexidine 2% Cloths 1 Application(s) Topical daily  lactobacillus acidophilus 1 Tablet(s) Oral daily  multivitamin/minerals 1 Tablet(s) Oral daily  mupirocin 2% Ointment 1 Application(s) Topical every 12 hours  nystatin Powder 1 Application(s) Topical two times a day  pantoprazole   Suspension 40 milliGRAM(s) Enteral Tube daily  sodium chloride 0.9% lock flush 10 milliLiter(s) IV Push every 1 hour PRN  zinc sulfate 220 milliGRAM(s) Oral daily    acetaminophen   Tablet .. 650 milliGRAM(s) Oral every 6 hours PRN  ALBUTerol    90 MICROgram(s) HFA Inhaler 2 Puff(s) Inhalation every 6 hours PRN  ascorbic acid 500 milliGRAM(s) Oral two times a day  chlorhexidine 2% Cloths 1 Application(s) Topical daily  DAPTOmycin IVPB 350 milliGRAM(s) IV Intermittent every 24 hours  enoxaparin Injectable 50 milliGRAM(s) SubCutaneous daily  fluconAZOLE IVPB 200 milliGRAM(s) IV Intermittent every 24 hours  fluconAZOLE IVPB      furosemide    Tablet 20 milliGRAM(s) Oral daily  lactobacillus acidophilus 1 Tablet(s) Oral daily  multivitamin/minerals 1 Tablet(s) Oral daily  mupirocin 2% Ointment 1 Application(s) Topical every 12 hours  nystatin Powder 1 Application(s) Topical two times a day  pantoprazole   Suspension 40 milliGRAM(s) Enteral Tube daily  rifAXIMin 550 milliGRAM(s) Oral two times a day  sodium chloride 0.9% lock flush 10 milliLiter(s) IV Push every 1 hour PRN  zinc sulfate 220 milliGRAM(s) Oral daily    Drug Dosing Weight  Height (cm): 167.6 (21 Oct 2020 02:26)  Weight (kg): 56.2 (21 Oct 2020 02:26)  BMI (kg/m2): 20 (21 Oct 2020 02:26)  BSA (m2): 1.63 (21 Oct 2020 02:26)    CENTRAL LINE: [ ] YES [x ] NO  LOCATION:   DATE INSERTED:  REMOVE: [ ] YES [ ] NO  EXPLAIN:    TREJO: [ ] YES [x] NO    DATE INSERTED:  REMOVE:  [ ] YES [ ] NO  EXPLAIN:    PAST MEDICAL & SURGICAL HISTORY:  Anasarca    Pulmonary embolism    DVT (deep venous thrombosis)    SBO (small bowel obstruction)    H/O exploratory laparotomy            PHYSICAL EXAM:    GENERAL: NAD  HEAD:  Atraumatic, Normocephalic  EYES: EOMI, PERRLA, conjunctiva and sclera clear  ENMT: No tonsillar erythema, exudate  NECK: Supple, No JVD  NERVOUS SYSTEM:  Alert & Oriented X2, Moving all extremities. Follows simple commands. +kyphoscoliosis  CHEST/LUNG: Diminished breath sounds bilaterally.  HEART: Regular rate and rhythm; No murmurs, rubs, or gallops  ABDOMEN: Soft, Nontender, Nondistended; Bowel sounds present  EXTREMITIES:  2+ Peripheral Pulses, No clubbing, cyanosis, or edema  LYMPH: No lymphadenopathy noted  SKIN: Unstageable sacral ulcer.      LABS:  CBC Full  -  ( 13 Dec 2020 06:54 )  WBC Count : 8.76 K/uL  RBC Count : 2.71 M/uL  Hemoglobin : 7.7 g/dL  Hematocrit : 25.7 %  Platelet Count - Automated : 294 K/uL  Mean Cell Volume : 94.8 fl  Mean Cell Hemoglobin : 28.4 pg  Mean Cell Hemoglobin Concentration : 30.0 gm/dL  Auto Neutrophil # : 6.36 K/uL  Auto Lymphocyte # : 1.38 K/uL  Auto Monocyte # : 0.68 K/uL  Auto Eosinophil # : 0.26 K/uL  Auto Basophil # : 0.04 K/uL  Auto Neutrophil % : 72.4 %  Auto Lymphocyte % : 15.8 %  Auto Monocyte % : 7.8 %  Auto Eosinophil % : 3.0 %  Auto Basophil % : 0.5 %    12-13    143  |  103  |  13  ----------------------------<  76  3.9   |  36<H>  |  0.61    Ca    8.3<L>      13 Dec 2020 06:54  Phos  3.1     12-13  Mg     1.8     12-13    TPro  6.3  /  Alb  1.8<L>  /  TBili  1.2  /  DBili  x   /  AST  43<H>  /  ALT  32  /  AlkPhos  279<H>  12-13              [  ]  DVT Prophylaxis  [  ]  Nutrition, Brand, Rate         Goal Rate        Abnormal Nutritional Status -  Malnutrition   Cachexia         RADIOLOGY & ADDITIONAL STUDIES:  ***  < from: Xray Chest 1 View- PORTABLE-Routine (Xray Chest 1 View- PORTABLE-Routine in AM.) (12.09.20 @ 11:54) >  Heart is likely enlarged. Left PICCline remains.    There are sizable effusions left greater than right. Adjacent infiltrate is possible.    Noted is an IVC filter.    Allowing for technique, study is similar to December 8.    IMPRESSION: Persistent effusions left greater than right.    < end of copied text >    Goals of Care Discussion with Family/Proxy/Other   - see note from 11/18

## 2020-12-14 NOTE — CHART NOTE - NSCHARTNOTEFT_GEN_A_CORE
Assessment:   65yFemalePatient is a 65y old  Female who presents with a chief complaint of Hypotension (14 Dec 2020 12:20). Pt visited.  Observed Lunch meal . Po intake is Poor. Pt  with Pressure ulcer DTI @ Coccyx, and  Unstaegable @ sacrum.  Meds Noted       Factors impacting intake: [ ] none [ ] nausea  [ ] vomiting [ ] diarrhea [ ] constipation  [ ]chewing problems [ ] swallowing issues  [ ] other:     Diet Prescription: Diet, Dysphagia 1 Pureed-Thin Liquids:   Low Sodium  Supplement Feeding Modality:  Oral  Two Papi HN Cans or Servings Per Day:  2       Frequency:  Two Times a day  Ensure Pudding Cans or Servings Per Day:  2       Frequency:  Two Times a day (12-02-20 @ 11:57)    Intake: < 50 %     Current Weight:   % Weight   Change Bed scale 109 lb with out blue boots    Pertinent Medications: MEDICATIONS  (STANDING):  ascorbic acid 500 milliGRAM(s) Oral two times a day  chlorhexidine 2% Cloths 1 Application(s) Topical daily  enoxaparin Injectable 50 milliGRAM(s) SubCutaneous daily  fluconAZOLE   Tablet 200 milliGRAM(s) Oral daily  furosemide    Tablet 20 milliGRAM(s) Oral daily  lactobacillus acidophilus 1 Tablet(s) Oral daily  linezolid    Tablet 600 milliGRAM(s) Oral every 12 hours  multivitamin/minerals 1 Tablet(s) Oral daily  mupirocin 2% Ointment 1 Application(s) Topical every 12 hours  nystatin Powder 1 Application(s) Topical two times a day  pantoprazole   Suspension 40 milliGRAM(s) Enteral Tube daily  rifAXIMin 550 milliGRAM(s) Oral two times a day  zinc sulfate 220 milliGRAM(s) Oral daily    MEDICATIONS  (PRN):  acetaminophen   Tablet .. 650 milliGRAM(s) Oral every 6 hours PRN Temp greater or equal to 38C (100.4F), Mild Pain (1 - 3), Moderate Pain (4 - 6)  ALBUTerol    90 MICROgram(s) HFA Inhaler 2 Puff(s) Inhalation every 6 hours PRN Shortness of Breath and/or Wheezing  sodium chloride 0.9% lock flush 10 milliLiter(s) IV Push every 1 hour PRN Pre/post blood products, medications, blood draw, and to maintain line patency    Pertinent Labs: 12-14 Na142 mmol/L Glu 77 mg/dL K+ 3.6 mmol/L Cr  0.58 mg/dL BUN 13 mg/dL 12-14 Phos 3.7 mg/dL 12-14 Alb 1.7 g/dL<L>     CAPILLARY BLOOD GLUCOSE      POCT Blood Glucose.: 94 mg/dL (14 Dec 2020 05:38)  POCT Blood Glucose.: 101 mg/dL (14 Dec 2020 00:00)    Skin:  DTI @coccyx, unstagable @ Sacrum    Estimated Needs:   [ ] no change since previous assessment  [ ] recalculated:     Previous Nutrition Diagnosis:   [ ] Inadequate Energy Intake [ ]Inadequate Oral Intake [ ] Excessive Energy Intake   [ ] Underweight [ ] Increased Nutrient Needs [ ] Overweight/Obesity   [ ] Altered GI Function [ ] Unintended Weight Loss [ ] Food & Nutrition Related Knowledge Deficit [ x] Malnutrition severe    Nutrition Diagnosis is [x ] ongoing  [ ] resolved [ ] not applicable     New Nutrition Diagnosis: [ ] not applicable       Interventions:   Recommend  [ ] Change Diet To:  [x ] Nutrition Supplement Continue with TWo papi HN BID, Ensure pudding BID.  [ ] Nutrition Support  [ ] Other:     Monitoring and Evaluation:   [ ] PO intake [ x ] Tolerance to diet prescription [ x ] weights [ x ] labs[ x ] follow up per protocol  [ ] other:

## 2020-12-14 NOTE — PROGRESS NOTE ADULT - PROBLEM SELECTOR PLAN 1
Bacteroides fragilis per BC on 10/13  Sputum positive for MRSA and Stenotrophomonas  Aspiration pneumonia vs HCAP .   Repeat BC 10/16 and 11/19 NGTD ,UC on 11/20 NGTD   Infected sacral ulcer- s/p debridement and culture grew Enterococcus (VRE) and Candida  Leukocytosis resolved, Cdiff negative  MRI sacrum/pelvis shows Osteomyelitis  c/w abx per ID Dapto until 12/28. can switch to linezolid on 12/14 for 14 days.  `PICC  for extended abx (for total of 6 weeks, thru Dec 29th).  May remove PICC before discharge if transitioning at discharge with oral antibiotics.   Linezolid and Diflucan to continue till 12/29 per Dr. Patricio, ID. Bacteroides fragilis per BC on 10/13  Sputum positive for MRSA and Stenotrophomonas  Aspiration pneumonia vs HCAP .   Repeat BC 10/16 and 11/19 NGTD ,UC on 11/20 NGTD   Infected sacral ulcer- s/p debridement and culture grew Enterococcus (VRE) and Candida  Leukocytosis resolved, Cdiff negative  MRI sacrum/pelvis shows Osteomyelitis  c/w abx per ID Dapto until 12/28. can switch to linezolid 600mg  q12h on 12/14 for 14 days. Fluconazole 200mg daily until 12/29.  `PICC  for extended abx (for total of 6 weeks, thru Dec 29th).  May remove PICC before discharge if transitioning at discharge with oral antibiotics.   Linezolid and Diflucan to continue till 12/29 per Dr. Patricio, ID.

## 2020-12-14 NOTE — PROGRESS NOTE ADULT - PROBLEM SELECTOR PLAN 9
DVT and GI prophylaxis.  OOB daily   Repeat COVID.  Awaiting accepting facility  Medically stable for discharge. DVT and GI prophylaxis.  OOB daily   Repeat COVID.  Awaiting accepting facility. CM following  Medically stable for discharge. DVT and GI prophylaxis.  OOB daily   Repeat COVID.  Awaiting accepting facility. CM following  Medically stable for discharge.  Overall prognosis is poor.

## 2020-12-14 NOTE — PROGRESS NOTE ADULT - PROBLEM SELECTOR PLAN 8
Patient needs daptomycin until 12/28  However, can be switched to linezolid on 12/14  Continue Diflucan until 12/29

## 2020-12-14 NOTE — CHART NOTE - NSCHARTNOTEFT_GEN_A_CORE
Left PICC line removed under aseptic technique.  Tolerated well.  Minimal bleeding noted.  Dry sterile dressing applied.

## 2020-12-14 NOTE — PROGRESS NOTE ADULT - SUBJECTIVE AND OBJECTIVE BOX
Patient was seen and examined  Patient is a 65y old  Female who presents with a chief complaint of Hypotension (14 Dec 2020 08:20)      INTERVAL HPI/OVERNIGHT EVENTS:  T(C): 37.7 (12-14-20 @ 05:15), Max: 37.7 (12-14-20 @ 05:15)  HR: 103 (12-14-20 @ 05:15) (103 - 107)  BP: 100/58 (12-14-20 @ 05:15) (100/58 - 108/62)  RR: 16 (12-14-20 @ 05:15) (16 - 18)  SpO2: 95% (12-14-20 @ 05:15) (95% - 99%)  Wt(kg): --  I&O's Summary      LABS:                        8.0    10.27 )-----------( 314      ( 14 Dec 2020 08:36 )             26.8     12-14    142  |  102  |  13  ----------------------------<  77  3.6   |  33<H>  |  0.58    Ca    8.5      14 Dec 2020 08:36  Phos  3.7     12-14  Mg     1.9     12-14    TPro  6.6  /  Alb  1.7<L>  /  TBili  1.3<H>  /  DBili  x   /  AST  40  /  ALT  30  /  AlkPhos  276<H>  12-14        CAPILLARY BLOOD GLUCOSE      POCT Blood Glucose.: 94 mg/dL (14 Dec 2020 05:38)  POCT Blood Glucose.: 101 mg/dL (14 Dec 2020 00:00)              MEDICATIONS  (STANDING):  ascorbic acid 500 milliGRAM(s) Oral two times a day  chlorhexidine 2% Cloths 1 Application(s) Topical daily  DAPTOmycin IVPB 350 milliGRAM(s) IV Intermittent every 24 hours  enoxaparin Injectable 50 milliGRAM(s) SubCutaneous daily  fluconAZOLE IVPB 200 milliGRAM(s) IV Intermittent every 24 hours  fluconAZOLE IVPB      furosemide    Tablet 20 milliGRAM(s) Oral daily  lactobacillus acidophilus 1 Tablet(s) Oral daily  multivitamin/minerals 1 Tablet(s) Oral daily  mupirocin 2% Ointment 1 Application(s) Topical every 12 hours  nystatin Powder 1 Application(s) Topical two times a day  pantoprazole   Suspension 40 milliGRAM(s) Enteral Tube daily  rifAXIMin 550 milliGRAM(s) Oral two times a day  zinc sulfate 220 milliGRAM(s) Oral daily    MEDICATIONS  (PRN):  acetaminophen   Tablet .. 650 milliGRAM(s) Oral every 6 hours PRN Temp greater or equal to 38C (100.4F), Mild Pain (1 - 3), Moderate Pain (4 - 6)  ALBUTerol    90 MICROgram(s) HFA Inhaler 2 Puff(s) Inhalation every 6 hours PRN Shortness of Breath and/or Wheezing  sodium chloride 0.9% lock flush 10 milliLiter(s) IV Push every 1 hour PRN Pre/post blood products, medications, blood draw, and to maintain line patency      RADIOLOGY & ADDITIONAL TESTS:    Imaging Personally Reviewed:  [ ] YES  [ ] NO    REVIEW OF SYSTEMS:  CONSTITUTIONAL: No fever, weight loss, or fatigue  EYES: No eye pain, visual disturbances, or discharge  ENMT:  No difficulty hearing, tinnitus, vertigo; No sinus or throat pain  NECK: No pain or stiffness  BREASTS: No pain, masses, or nipple discharge  RESPIRATORY: No cough, wheezing, chills or hemoptysis; No shortness of breath  CARDIOVASCULAR: No chest pain, palpitations, dizziness, or leg swelling  GASTROINTESTINAL: No abdominal or epigastric pain. No nausea, vomiting, or hematemesis; No diarrhea or constipation. No melena or hematochezia.  GENITOURINARY: No dysuria, frequency, hematuria, or incontinence  NEUROLOGICAL: No headaches, memory loss, loss of strength, numbness, or tremors  SKIN: No itching, burning, rashes, or lesions   LYMPH NODES: No enlarged glands  ENDOCRINE: No heat or cold intolerance; No hair loss  MUSCULOSKELETAL: No joint pain or swelling; No muscle, back, or extremity pain  PSYCHIATRIC: No depression, anxiety, mood swings, or difficulty sleeping  HEME/LYMPH: No easy bruising, or bleeding gums  ALLERY AND IMMUNOLOGIC: No hives or eczema      Consultant(s) Notes Reviewed:  [ x ] YES  [ ] NO    PHYSICAL EXAM:  GENERAL: NAD, well-groomed, well-developed  HEAD:  Atraumatic, Normocephalic  EYES: EOMI, PERRLA, conjunctiva and sclera clear  ENMT: No tonsillar erythema, exudates, or enlargement; Moist mucous membranes, Good dentition, No lesions  NECK: Supple, No JVD, Normal thyroid  NERVOUS SYSTEM:  Alert & Oriented X3, Good concentration; Motor Strength 5/5 B/L upper and lower extremities; DTRs 2+ intact and symmetric  CHEST/LUNG: Clear to percussion bilaterally; No rales, rhonchi, wheezing, or rubs  HEART: Regular rate and rhythm; No murmurs, rubs, or gallops  ABDOMEN: Soft, Nontender, Nondistended; Bowel sounds present  EXTREMITIES:  2+ Peripheral Pulses, No clubbing, cyanosis, or edema  LYMPH: No lymphadenopathy noted  SKIN: No rashes or lesions    Care Discussed with Consultants/Other Providers [ x] YES  [ ] NO

## 2020-12-14 NOTE — PROGRESS NOTE ADULT - ASSESSMENT
64 yo Female with Hx of recurrent SBO, s/p SB resection in 2015, s/p lysis of adhesions in 2018, Hx of DVT, PE (was on Xarelto), IVC filter was admitted with sepsis, found to have Bacteroides fragilis bacteremia, transferred to ICU b/o hypotension, hypothermia, was on pressors, intubated, developed HCAP/aspirational PNA, sputum was growing MRSA and Stenotrophomonas, MOF with EF 10-15%, encephalopathy, worsening liver tests; successfully treated with antibiotics / HF mgmt., succesfully extubated with improved mental status, and improved liver tests, but with infected sacral decubiti, being treated for osteomyelitis, on daptomycin and fluconazole.      - Initially possibly was the combination of sepsis with MOF and HF, significantly improved, but still with some hyperbilirubinemia (1.3), and ;  AST 40, ALT 30; without encephalopathy  - cannot rule out indeed underlying vascular condition or other etiology (so far KATHY neg, consider sending out rest of CLD, including rest of AI workup and AMA, Ig panel); still ongoing acute issues and antibiotics/antifungal can contribute; consider further workup if remains elevated including liver biopsy and MRCP if no contraindication  - Repeat US w Doppler in ICU was not successful now with patient improvement, can attempt again to visualize HV  - Prior admission patient was referred to [facility] for workup for possible HV thrombosis, patient can follow there upon discharge as previously planned, but now still being treated for decubiti / OM  - Had EF 10-15%, repeat echo shows improvement 55-60%.    Will continue to follow  D/w primary team  Thank you for consult     66 yo Female with Hx of recurrent SBO, s/p SB resection in 2015, s/p lysis of adhesions in 2018, Hx of DVT, PE (was on Xarelto), IVC filter was admitted with sepsis, found to have Bacteroides fragilis bacteremia, transferred to ICU b/o hypotension, hypothermia, was on pressors, intubated, developed HCAP/aspirational PNA, sputum was growing MRSA and Stenotrophomonas, MOF with EF 10-15%, encephalopathy, worsening liver tests; successfully treated with antibiotics / HF mgmt., succesfully extubated with improved mental status, and improved liver tests, but with infected sacral decubiti, being treated for osteomyelitis, on daptomycin and fluconazole.      - Initially possibly was the combination of sepsis with MOF and HF, significantly improved, but still with some hyperbilirubinemia (1.3), and ;  AST 40, ALT 30; without encephalopathy  - cannot rule out indeed underlying vascular condition or other etiology (so far KATHY neg, consider sending out rest of CLD, including rest of AI workup and AMA, Ig panel, repeat iron studies); on other hand still ongoing acute issues and antibiotics/antifungal can contribute; consider further workup if remains elevated including liver biopsy and MRCP if no contraindication  - Repeat US w Doppler in ICU was not successful now with patient improvement, can attempt again to visualize HV  - Prior admission patient was referred to [facility] for workup for possible HV thrombosis, patient can follow there upon discharge as previously planned, but now still being treated for decubiti / OM  - Had EF 10-15%, repeat echo shows improvement 55-60%.  - F/u Hep A IgG and HBsAb, HBcAb to check immunity, offer vaccine if not immune    Will continue to follow  D/w primary team  Thank you for consult

## 2020-12-14 NOTE — PROGRESS NOTE ADULT - PROBLEM SELECTOR PLAN 1
Bacteroides fragilis per BC on 10/13  Sputum positive for MRSA and Stenotrophomonas  Aspiration pneumonia vs HCAP .   Repeat BC 10/16 and 11/19 NGTD ,UC on 11/20 NGTD   Infected sacral ulcer- s/p debridement and culture grew Enterococcus (VRE) and Candida  Leukocytosis resolved, Cdiff negative  MRI sacrum/pelvis shows Osteomyelitis  c/w abx per ID Dapto until 12/28. can switch to linezolid on 12/14 for 14 days.  `PICC  for extended abx (for total of 6 weeks, thru Dec 29th).  May remove PICC before discharge if transitioning at discharge with oral antibiotics.   Linezolid and Diflucan to continue till 12/29 per Dr. Patricio, ID.

## 2020-12-14 NOTE — PROGRESS NOTE ADULT - SUBJECTIVE AND OBJECTIVE BOX
Patient was seen and examined at bedside. Full note to follow. Chief Complaint:  Patient is a 65y old  Female who presents with a chief complaint of Hypotension (14 Dec 2020 16:55)      Reason for consult: r/o Budd Chiari, abnormal Liver enzymes    Interval Events: Patient seen and examined at bedside. Continued on OM treatment, being switched to oral abx. ALP remains elevated. Today c/o L hand swelling and pain, had prior iv line there per primary team.      Hospital Medications:  acetaminophen   Tablet .. 650 milliGRAM(s) Oral every 6 hours PRN  ALBUTerol    90 MICROgram(s) HFA Inhaler 2 Puff(s) Inhalation every 6 hours PRN  ascorbic acid 500 milliGRAM(s) Oral two times a day  chlorhexidine 2% Cloths 1 Application(s) Topical daily  enoxaparin Injectable 50 milliGRAM(s) SubCutaneous daily  fluconAZOLE   Tablet 200 milliGRAM(s) Oral daily  furosemide    Tablet 20 milliGRAM(s) Oral daily  lactobacillus acidophilus 1 Tablet(s) Oral daily  linezolid    Tablet 600 milliGRAM(s) Oral every 12 hours  multivitamin/minerals 1 Tablet(s) Oral daily  mupirocin 2% Ointment 1 Application(s) Topical every 12 hours  nystatin Powder 1 Application(s) Topical two times a day  pantoprazole   Suspension 40 milliGRAM(s) Enteral Tube daily  rifAXIMin 550 milliGRAM(s) Oral two times a day  sodium chloride 0.9% lock flush 10 milliLiter(s) IV Push every 1 hour PRN  zinc sulfate 220 milliGRAM(s) Oral daily      ROS:   General:  No  fevers, chills, night sweats, fatigue  CV:  No pain, palpitations  Pulm:  No dyspnea, cough  GI:  No abdominal pain, N/V, D/C  :  No dysuria  Muscle:  Generalized weakness  Neuro:  AAOx3  C/o L hand swelling and pain    PHYSICAL EXAM:   Vital Signs:  Vital Signs Last 24 Hrs  T(C): 37.8 (14 Dec 2020 12:00), Max: 37.8 (14 Dec 2020 12:00)  T(F): 100.1 (14 Dec 2020 12:00), Max: 100.1 (14 Dec 2020 12:00)  HR: 103 (14 Dec 2020 12:00) (103 - 107)  BP: 111/71 (14 Dec 2020 12:00) (100/58 - 111/71)  BP(mean): --  RR: 20 (14 Dec 2020 12:00) (16 - 20)  SpO2: 98% (14 Dec 2020 12:00) (95% - 98%)  Daily     Daily     GENERAL: no acute distress  NEURO: awake, alert, oriented x3, no asterixis  HEENT: anicteric sclera, no conjunctival pallor appreciated  CHEST: no respiratory distress, no accessory muscle use  CARDIAC: regular rate, rhythm  ABDOMEN: soft, non-tender, non-distended, no rebound or guarding, BS+, no large ascites  EXTREMITIES: warm, well perfused, no LE edema, but has L hand edema (new), tender, but without erythema      LABS: reviewed                        8.0    10.27 )-----------( 314      ( 14 Dec 2020 08:36 )             26.8     12-14    142  |  102  |  13  ----------------------------<  77  3.6   |  33<H>  |  0.58    Ca    8.5      14 Dec 2020 08:36  Phos  3.7     12-14  Mg     1.9     12-14    TPro  6.6  /  Alb  1.7<L>  /  TBili  1.3<H>  /  DBili  x   /  AST  40  /  ALT  30  /  AlkPhos  276<H>  12-14    LIVER FUNCTIONS - ( 14 Dec 2020 08:36 )  Alb: 1.7 g/dL / Pro: 6.6 g/dL / ALK PHOS: 276 U/L / ALT: 30 U/L DA / AST: 40 U/L / GGT: x             Interval Diagnostic Studies: see sunrise for full report

## 2020-12-14 NOTE — PROGRESS NOTE ADULT - PROBLEM SELECTOR PLAN 9
DVT and GI prophylaxis.  OOB daily   Repeat COVID.  Awaiting accepting facility. CM following  Medically stable for discharge.

## 2020-12-15 NOTE — PROGRESS NOTE ADULT - SUBJECTIVE AND OBJECTIVE BOX
Patient is seen and examined at the bed side,  is afebrile now, spiked fever  last night.       REVIEW OF SYSTEMS: All other review systems are negative      ALLERGIES: No Known Allergies      Vital Signs Last 24 Hrs  T(C): 36.3 (15 Dec 2020 13:55), Max: 38.4 (14 Dec 2020 20:30)  T(F): 97.3 (15 Dec 2020 13:55), Max: 101.1 (14 Dec 2020 20:30)  HR: 110 (15 Dec 2020 13:55) (98 - 110)  BP: 92/61 (15 Dec 2020 13:55) (92/61 - 103/63)  BP(mean): --  RR: 18 (15 Dec 2020 13:55) (18 - 20)  SpO2: 97% (15 Dec 2020 13:55) (94% - 97%)      PHYSICAL EXAM:  GENERAL: Not in distress, off oxygen   CHEST/LUNG: Not using accessory muscles   HEART: s1 and s2 present  ABDOMEN:  Nontender and  Nondistended  EXTREMITIES: B/L UE edematous improved  CNS: Awake and Alert         LABS:                        8.0    8.81  )-----------( 319      ( 15 Dec 2020 06:33 )             26.6                           8.0    10.27 )-----------( 314      ( 14 Dec 2020 08:36 )             26.8                9.4    13.14 )-----------( 149      ( 14 Nov 2020 07:58 )             28.7       12-15    146<H>  |  104  |  15  ----------------------------<  72  3.6   |  35<H>  |  0.67    Ca    8.5      15 Dec 2020 06:33  Phos  3.6     12-15  Mg     2.0     12-15    TPro  6.6  /  Alb  1.8<L>  /  TBili  1.3<H>  /  DBili  x   /  AST  37  /  ALT  28  /  AlkPhos  279<H>  12-15      11-06    138  |  104  |  37<H>  ----------------------------<  81  3.9   |  25  |  2.37<H>    Ca    8.1<L>      06 Nov 2020 06:20  Phos  3.4     11-06  Mg     2.0     11-06    TPro  5.1<L>  /  Alb  2.8<L>  /  TBili  9.2<H>  /  DBili  x   /  AST  233<H>  /  ALT  99<H>  /  AlkPhos  96  11-06      Vancomycin Level, Trough (11.07.20 @ 21:49)   Vancomycin Level, Trough: 16.1:     Vancomycin Level, Trough (11.07.20 @ 07:08) : 19.5  Vancomycin Level, Trough (11.06.20 @ 06:20) : 19.9:   Procalcitonin, Serum (11.28.20 @ 15:38) : 1.55:       MEDICATIONS  (STANDING):    ascorbic acid 500 milliGRAM(s) Oral two times a day  chlorhexidine 2% Cloths 1 Application(s) Topical daily  enoxaparin Injectable 50 milliGRAM(s) SubCutaneous daily  fluconAZOLE   Tablet 200 milliGRAM(s) Oral daily  furosemide    Tablet 20 milliGRAM(s) Oral daily  lactobacillus acidophilus 1 Tablet(s) Oral daily  linezolid    Tablet 600 milliGRAM(s) Oral every 12 hours  multivitamin/minerals 1 Tablet(s) Oral daily  nystatin Powder 1 Application(s) Topical two times a day  pantoprazole   Suspension 40 milliGRAM(s) Enteral Tube daily  rifAXIMin 550 milliGRAM(s) Oral two times a day  zinc sulfate 220 milliGRAM(s) Oral daily          RADIOLOGY & ADDITIONAL TESTS:    12/9/20 : Xray Chest 1 View- PORTABLE-Routine (Xray Chest 1 View- PORTABLE-Routine in AM.) (12.09.20 @ 11:54) >  IMPRESSION: Persistent effusions left greater than right.      11/27/20 : Xray Chest 1 View- PORTABLE-Urgent (Xray Chest 1 View- PORTABLE-Urgent .) (11.27.20 @ 06:53) Increased interstitial lung markings with bilateral consolidations right greater than left. Small right pleural effusion. Overall worsening compared to prior exam.      11/24/20 : MR Pelvis Bony Only No Cont (11.24.20 @ 18:02) : Osteomyelitis of the lower sacrum and coccyx with overlying cutaneous ulceration,      11/19/20 : Xray Chest 1 View- PORTABLE-Urgent (Xray Chest 1 View- PORTABLE-Urgent .) (11.19.20 @ 23:53): Improvement in bilateral airspace disease with persistent bibasilar consolidation and small effusions.    11/4/20 : Xray Chest 1 View- PORTABLE-Routine (Xray Chest 1 View- PORTABLE-Routine in AM.) (11.04.20 @ 10:59) Reexpansion of the left lung with residual left pleural effusion and/or infiltrate.  Right pulmonary edema unchanged.  Tubes and catheters in satisfactory position.      10/25/20: Xray Chest 1 View- PORTABLE-Routine (Xray Chest 1 View- PORTABLE-Routine in AM.) (10.25.20 @ 09:21) Frontal expiratory view of the chest shows the heart to be similar in size. Endotracheal tube, right jugular line and feeding tube remain present. The lungs show similar left upper lobe infiltrate with progression of right perihilar infiltrate. Pleural effusions are similar. There is no evidence of pneumothorax.    10/21/20 : CT Abdomen and Pelvis w/ Oral Cont and w/ IV Cont (10.21.20 @ 15:59) Mural thickening of the left colon and rectum. Liquid stool in the colon. Apparent segmental mural thickening of the mid to distal small bowel and the proximal duodenum. Findings may represent nonspecific enterocolitis, noninfectious inflammatory bowel disease, or ischemic bowel. Clinical correlation is recommended. The celiac axis artery, SMA, and KINA are patent without stenosis.    Dilatation of the mid small bowel is again noted. Oral contrast has reached the terminal ileum. Findings may represent small bowel obstruction, or ileus related to nonspecific enterocolitis.   Cholelithiasis. Moderate to large ascites in the abdomen, increased since the previous examination. 5 mm nonspecific noncalcified left upper lobe lung nodule; if the patient's is in the high risk category (i.e. smoker), follow-up chest CT may be pursued in 12 months to ensure stability. Combination of atelectasis and consolidation in the left lower lobe.  Possible 1.0 cm hypodense lesion in the left lobe of the thyroid. Thyroid ultrasound may be pursued for further evaluation.   Mild bilateral pleural effusions.  Aging determinate compression fracture at T5 vertebra.        MICROBIOLOGY DATA:  MRSA/MSSA PCR (12.01.20 @ 01:16)   MRSA PCR Result.: Detected:    MRSA/MSSA PCR (11.28.20 @ 11:34)   MRSA PCR Result.: Detected:     Urine Microscopic-Add On (NC) (11.20.20 @ 04:12)   Red Blood Cell - Urine: 5-10 /HPF   White Blood Cell - Urine: 11-25 /HPF   Calcium Oxalate Crystals: Few /HPF   Bacteria: Moderate /HPF   Comment - Urine: few amorphous urates   Epithelial Cells: Few /HPF     Culture - Blood (11.19.20 @ 10:19)   Specimen Source: .Blood Blood-Peripheral   Culture Results: No growth to date.     Culture - Surgical Swab (11.12.20 @ 05:01)   - Ampicillin: R >8 Predicts results to ampicillin/sulbactam, amoxacillin-clavulanate and piperacillin-tazobactam.   - Levofloxacin: R >4   - Linezolid: S 1   - Tetra/Doxy: R >8   - Vancomycin: R >16   Specimen Source: .Surgical Swab Sacral decub   Culture Results:   Few Enterococcus faecium (vancomycin resistant)   Rare Candida albicans "Susceptibilities not performed"   Organism Identification: Enterococcus faecium (vancomycin resistant)   Organism: Enterococcus faecium (vancomycin resistant)   Method Type: STEWART     Culture - Surgical Swab (11.12.20 @ 05:01)   Specimen Source: .Surgical Swab Sacral decub   Culture Results:  Few Enterococcus faecium Susceptibility to follow.   Rare Candida albicans "Susceptibilities not performed"     Culture - Sputum . (10.26.20 @ 00:26)   - Ampicillin/Sulbactam: R <=8/4   - Cefazolin: R >16   - Ceftazidime: I 16   - Clindamycin: R >4   - Erythromycin: R >4   - Gentamicin: S <=1 Should not be used as monotherapy   - Levofloxacin: S <=0.5   - Linezolid: S 2   - Oxacillin: R >2   - Penicillin: R >8   - RIF- Rifampin: S <=1 Should not be used as monotherapy   - Tetra/Doxy: S <=1   - Trimethoprim/Sulfamethoxazole: S <=0.5/9.5   - Trimethoprim/Sulfamethoxazole: S <=0.5/9.5   - Vancomycin: S 1     MRSA/MSSA PCR (10.21.20 @ 09:41)   MRSA PCR Result.: Detected:       Culture - Blood (10.20.20 @ 22:09)   Specimen Source: .Blood Blood   Culture Results:  No growth to date.     Culture - Blood (10.20.20 @ 22:09)   Specimen Source: .Blood Blood   Culture Results:   No growth to date.     Culture - Blood in AM (10.16.20 @ 10:13)   Specimen Source: .Blood Blood-Peripheral   Culture Results: No growth to date.     Culture - Blood in AM (10.16.20 @ 10:13)   Specimen Source: .Blood Blood-Peripheral   Culture Results: No growth to date.     Culture - Blood (10.13.20 @ 22:23)   Growth in anaerobic bottle: Gram Negative Rods   Specimen Source: .Blood Blood-Peripheral   Organism: Blood Culture PCR   Culture Results: Growth in anaerobic bottle: Bacteroides fragilis   "Susceptibilities not performed"     Culture - Blood (10.13.20 @ 22:23)   Specimen Source: .Blood Blood-Peripheral   Culture Results:   No growth to date.                        Patient is seen and examined at the bed side,  is afebrile now, spiked fever  last night. The WBC count and creatinine stay normal.       REVIEW OF SYSTEMS: All other review systems are negative      ALLERGIES: No Known Allergies      Vital Signs Last 24 Hrs  T(C): 36.3 (15 Dec 2020 13:55), Max: 38.4 (14 Dec 2020 20:30)  T(F): 97.3 (15 Dec 2020 13:55), Max: 101.1 (14 Dec 2020 20:30)  HR: 110 (15 Dec 2020 13:55) (98 - 110)  BP: 92/61 (15 Dec 2020 13:55) (92/61 - 103/63)  BP(mean): --  RR: 18 (15 Dec 2020 13:55) (18 - 20)  SpO2: 97% (15 Dec 2020 13:55) (94% - 97%)      PHYSICAL EXAM:  GENERAL: Not in distress, off oxygen   CHEST/LUNG: Not using accessory muscles   HEART: s1 and s2 present  ABDOMEN:  Nontender and  Nondistended  EXTREMITIES: B/L UE edematous improved  CNS: Awake and Alert         LABS:                        8.0    8.81  )-----------( 319      ( 15 Dec 2020 06:33 )             26.6                           8.0    10.27 )-----------( 314      ( 14 Dec 2020 08:36 )             26.8                9.4    13.14 )-----------( 149      ( 14 Nov 2020 07:58 )             28.7         12-15    146<H>  |  104  |  15  ----------------------------<  72  3.6   |  35<H>  |  0.67    Ca    8.5      15 Dec 2020 06:33  Phos  3.6     12-15  Mg     2.0     12-15    TPro  6.6  /  Alb  1.8<L>  /  TBili  1.3<H>  /  DBili  x   /  AST  37  /  ALT  28  /  AlkPhos  279<H>  12-15      11-06    138  |  104  |  37<H>  ----------------------------<  81  3.9   |  25  |  2.37<H>    Ca    8.1<L>      06 Nov 2020 06:20  Phos  3.4     11-06  Mg     2.0     11-06    TPro  5.1<L>  /  Alb  2.8<L>  /  TBili  9.2<H>  /  DBili  x   /  AST  233<H>  /  ALT  99<H>  /  AlkPhos  96  11-06      Vancomycin Level, Trough (11.07.20 @ 21:49)   Vancomycin Level, Trough: 16.1:     Vancomycin Level, Trough (11.07.20 @ 07:08) : 19.5  Vancomycin Level, Trough (11.06.20 @ 06:20) : 19.9:   Procalcitonin, Serum (11.28.20 @ 15:38) : 1.55:       MEDICATIONS  (STANDING):    ascorbic acid 500 milliGRAM(s) Oral two times a day  chlorhexidine 2% Cloths 1 Application(s) Topical daily  enoxaparin Injectable 50 milliGRAM(s) SubCutaneous daily  fluconAZOLE   Tablet 200 milliGRAM(s) Oral daily  furosemide    Tablet 20 milliGRAM(s) Oral daily  lactobacillus acidophilus 1 Tablet(s) Oral daily  linezolid    Tablet 600 milliGRAM(s) Oral every 12 hours  multivitamin/minerals 1 Tablet(s) Oral daily  nystatin Powder 1 Application(s) Topical two times a day  pantoprazole   Suspension 40 milliGRAM(s) Enteral Tube daily  rifAXIMin 550 milliGRAM(s) Oral two times a day  zinc sulfate 220 milliGRAM(s) Oral daily        RADIOLOGY & ADDITIONAL TESTS:    12/9/20 : Xray Chest 1 View- PORTABLE-Routine (Xray Chest 1 View- PORTABLE-Routine in AM.) (12.09.20 @ 11:54) >  IMPRESSION: Persistent effusions left greater than right.      11/27/20 : Xray Chest 1 View- PORTABLE-Urgent (Xray Chest 1 View- PORTABLE-Urgent .) (11.27.20 @ 06:53) Increased interstitial lung markings with bilateral consolidations right greater than left. Small right pleural effusion. Overall worsening compared to prior exam.      11/24/20 : MR Pelvis Bony Only No Cont (11.24.20 @ 18:02) : Osteomyelitis of the lower sacrum and coccyx with overlying cutaneous ulceration,      11/19/20 : Xray Chest 1 View- PORTABLE-Urgent (Xray Chest 1 View- PORTABLE-Urgent .) (11.19.20 @ 23:53): Improvement in bilateral airspace disease with persistent bibasilar consolidation and small effusions.    11/4/20 : Xray Chest 1 View- PORTABLE-Routine (Xray Chest 1 View- PORTABLE-Routine in AM.) (11.04.20 @ 10:59) Reexpansion of the left lung with residual left pleural effusion and/or infiltrate.  Right pulmonary edema unchanged.  Tubes and catheters in satisfactory position.      10/25/20: Xray Chest 1 View- PORTABLE-Routine (Xray Chest 1 View- PORTABLE-Routine in AM.) (10.25.20 @ 09:21) Frontal expiratory view of the chest shows the heart to be similar in size. Endotracheal tube, right jugular line and feeding tube remain present. The lungs show similar left upper lobe infiltrate with progression of right perihilar infiltrate. Pleural effusions are similar. There is no evidence of pneumothorax.    10/21/20 : CT Abdomen and Pelvis w/ Oral Cont and w/ IV Cont (10.21.20 @ 15:59) Mural thickening of the left colon and rectum. Liquid stool in the colon. Apparent segmental mural thickening of the mid to distal small bowel and the proximal duodenum. Findings may represent nonspecific enterocolitis, noninfectious inflammatory bowel disease, or ischemic bowel. Clinical correlation is recommended. The celiac axis artery, SMA, and KINA are patent without stenosis.    Dilatation of the mid small bowel is again noted. Oral contrast has reached the terminal ileum. Findings may represent small bowel obstruction, or ileus related to nonspecific enterocolitis.   Cholelithiasis. Moderate to large ascites in the abdomen, increased since the previous examination. 5 mm nonspecific noncalcified left upper lobe lung nodule; if the patient's is in the high risk category (i.e. smoker), follow-up chest CT may be pursued in 12 months to ensure stability. Combination of atelectasis and consolidation in the left lower lobe.  Possible 1.0 cm hypodense lesion in the left lobe of the thyroid. Thyroid ultrasound may be pursued for further evaluation.   Mild bilateral pleural effusions.  Aging determinate compression fracture at T5 vertebra.        MICROBIOLOGY DATA:  MRSA/MSSA PCR (12.01.20 @ 01:16)   MRSA PCR Result.: Detected:    MRSA/MSSA PCR (11.28.20 @ 11:34)   MRSA PCR Result.: Detected:     Urine Microscopic-Add On (NC) (11.20.20 @ 04:12)   Red Blood Cell - Urine: 5-10 /HPF   White Blood Cell - Urine: 11-25 /HPF   Calcium Oxalate Crystals: Few /HPF   Bacteria: Moderate /HPF   Comment - Urine: few amorphous urates   Epithelial Cells: Few /HPF     Culture - Blood (11.19.20 @ 10:19)   Specimen Source: .Blood Blood-Peripheral   Culture Results: No growth to date.     Culture - Surgical Swab (11.12.20 @ 05:01)   - Ampicillin: R >8 Predicts results to ampicillin/sulbactam, amoxacillin-clavulanate and piperacillin-tazobactam.   - Levofloxacin: R >4   - Linezolid: S 1   - Tetra/Doxy: R >8   - Vancomycin: R >16   Specimen Source: .Surgical Swab Sacral decub   Culture Results:   Few Enterococcus faecium (vancomycin resistant)   Rare Candida albicans "Susceptibilities not performed"   Organism Identification: Enterococcus faecium (vancomycin resistant)   Organism: Enterococcus faecium (vancomycin resistant)   Method Type: STEWART     Culture - Surgical Swab (11.12.20 @ 05:01)   Specimen Source: .Surgical Swab Sacral decub   Culture Results:  Few Enterococcus faecium Susceptibility to follow.   Rare Candida albicans "Susceptibilities not performed"     Culture - Sputum . (10.26.20 @ 00:26)   - Ampicillin/Sulbactam: R <=8/4   - Cefazolin: R >16   - Ceftazidime: I 16   - Clindamycin: R >4   - Erythromycin: R >4   - Gentamicin: S <=1 Should not be used as monotherapy   - Levofloxacin: S <=0.5   - Linezolid: S 2   - Oxacillin: R >2   - Penicillin: R >8   - RIF- Rifampin: S <=1 Should not be used as monotherapy   - Tetra/Doxy: S <=1   - Trimethoprim/Sulfamethoxazole: S <=0.5/9.5   - Trimethoprim/Sulfamethoxazole: S <=0.5/9.5   - Vancomycin: S 1     MRSA/MSSA PCR (10.21.20 @ 09:41)   MRSA PCR Result.: Detected:       Culture - Blood (10.20.20 @ 22:09)   Specimen Source: .Blood Blood   Culture Results:  No growth to date.     Culture - Blood (10.20.20 @ 22:09)   Specimen Source: .Blood Blood   Culture Results:   No growth to date.     Culture - Blood in AM (10.16.20 @ 10:13)   Specimen Source: .Blood Blood-Peripheral   Culture Results: No growth to date.     Culture - Blood in AM (10.16.20 @ 10:13)   Specimen Source: .Blood Blood-Peripheral   Culture Results: No growth to date.     Culture - Blood (10.13.20 @ 22:23)   Growth in anaerobic bottle: Gram Negative Rods   Specimen Source: .Blood Blood-Peripheral   Organism: Blood Culture PCR   Culture Results: Growth in anaerobic bottle: Bacteroides fragilis   "Susceptibilities not performed"     Culture - Blood (10.13.20 @ 22:23)   Specimen Source: .Blood Blood-Peripheral   Culture Results:   No growth to date.

## 2020-12-15 NOTE — PROGRESS NOTE ADULT - PROBLEM SELECTOR PLAN 8
Awaiting for facility acceptance.  Dapto d/c and patient started on linezolid  Fluconazole switched to oral.

## 2020-12-15 NOTE — PROGRESS NOTE ADULT - ASSESSMENT
65y Female from home, lives with daughter, ambulates with walker with PMH of recurrent SBO's, s/p exp lap, SB resection in 2015, ex lap, ALBINA in 2018, DVT, PE, on Xarelto, IVC filter, chronic leg swelling, and anasarca, came in to the ED due to hypotension during office visit. Patient was found to be bacteremic with bacteroids fragilis. Admitted in ICU due to hypotension and hypothermia. patient completed course of antibiotics . BCx X2 negative. Ammonia level elevated and patient refused NGT placement, mental status deteriorated and patient desaturated to high 70%, patient got intubated on 10/24 . Sputum was positive for MRSA and Stenotrophomonas. patient started on vancomycin and Levaquin. patient was given lactulose for high ammonia level. kidney function and liver function started to deteriorate and patient was leaning toward multi organ failure . high dose of Lasix started and patient was aggressively diuresed. Kidney function improved . Ammonia level dropped to less than 10.  Mental status improved and patient extubated on 11/3/2020. Patient was seen by wound specialist and decubitus ulcer debrided. patient had a drop in h/h s/p debridement . received 1 unit PRBC .     11/12  Transferred from ICU to SCU  `11/17  Midodrine d/c secondary to daptomycin being started.  11/18 Febrile, Tmax 101.5. Repeat BC sent.   11/19 Discontinue velazquez.   11/20 febrile overnight.   11/22 s/p 2 units PRBC for Hgb 6.4  11/24 MRI pelvis (+) OM  11/25 PAVAN no endocarditis. Awaiting PICC  11/27 Spiked fever of 101F; Repeated cultures, chest xray, lactate, Follow up results. IR Denies PICC Placement due to fever. Plan for IR Monday (NPO Sunday) if afebrile over the weekend. Given one dose of lasix IV today for pleural effusion on chest xray; Give one more dose of IV Lasix 40mg tomorrow at 2pm if BP stable. Continue with daily lasix.     12/14 PICC line removed.

## 2020-12-15 NOTE — PROGRESS NOTE ADULT - SUBJECTIVE AND OBJECTIVE BOX
Patient was seen and examined  Patient is a 65y old  Female who presents with a chief complaint of Hypotension (15 Dec 2020 19:12)      INTERVAL HPI/OVERNIGHT EVENTS:  T(C): 36.3 (12-15-20 @ 13:55), Max: 38.4 (20 @ 20:30)  HR: 110 (12-15-20 @ 13:55) (98 - 110)  BP: 92/61 (12-15-20 @ 13:55) (92/61 - 103/63)  RR: 18 (12-15-20 @ 13:55) (18 - 20)  SpO2: 97% (12-15-20 @ 13:55) (94% - 97%)  Wt(kg): --  I&O's Summary      LABS:                        8.0    8.81  )-----------( 319      ( 15 Dec 2020 06:33 )             26.6     12-15    146<H>  |  104  |  15  ----------------------------<  72  3.6   |  35<H>  |  0.67    Ca    8.5      15 Dec 2020 06:33  Phos  3.6     12-15  Mg     2.0     -15    TPro  6.6  /  Alb  1.8<L>  /  TBili  1.3<H>  /  DBili  x   /  AST  37  /  ALT  28  /  AlkPhos  279<H>  12-15      Urinalysis Basic - ( 14 Dec 2020 12:17 )    Color: Yellow / Appearance: Clear / S.010 / pH: x  Gluc: x / Ketone: Negative  / Bili: Negative / Urobili: Negative   Blood: x / Protein: 30 mg/dL / Nitrite: Negative   Leuk Esterase: Negative / RBC: 2-5 /HPF / WBC 3-5 /HPF   Sq Epi: x / Non Sq Epi: Occasional /HPF / Bacteria: Moderate /HPF      CAPILLARY BLOOD GLUCOSE              Urinalysis Basic - ( 14 Dec 2020 12:17 )    Color: Yellow / Appearance: Clear / S.010 / pH: x  Gluc: x / Ketone: Negative  / Bili: Negative / Urobili: Negative   Blood: x / Protein: 30 mg/dL / Nitrite: Negative   Leuk Esterase: Negative / RBC: 2-5 /HPF / WBC 3-5 /HPF   Sq Epi: x / Non Sq Epi: Occasional /HPF / Bacteria: Moderate /HPF        MEDICATIONS  (STANDING):  ascorbic acid 500 milliGRAM(s) Oral two times a day  chlorhexidine 2% Cloths 1 Application(s) Topical daily  enoxaparin Injectable 50 milliGRAM(s) SubCutaneous daily  fluconAZOLE   Tablet 200 milliGRAM(s) Oral daily  furosemide    Tablet 20 milliGRAM(s) Oral daily  lactobacillus acidophilus 1 Tablet(s) Oral daily  linezolid    Tablet 600 milliGRAM(s) Oral every 12 hours  multivitamin/minerals 1 Tablet(s) Oral daily  nystatin Powder 1 Application(s) Topical two times a day  pantoprazole   Suspension 40 milliGRAM(s) Enteral Tube daily  rifAXIMin 550 milliGRAM(s) Oral two times a day  zinc sulfate 220 milliGRAM(s) Oral daily    MEDICATIONS  (PRN):  acetaminophen   Tablet .. 650 milliGRAM(s) Oral every 6 hours PRN Temp greater or equal to 38C (100.4F), Mild Pain (1 - 3), Moderate Pain (4 - 6)  ALBUTerol    90 MICROgram(s) HFA Inhaler 2 Puff(s) Inhalation every 6 hours PRN Shortness of Breath and/or Wheezing  sodium chloride 0.9% lock flush 10 milliLiter(s) IV Push every 1 hour PRN Pre/post blood products, medications, blood draw, and to maintain line patency      RADIOLOGY & ADDITIONAL TESTS:    Imaging Personally Reviewed:  [ ] YES  [ ] NO    REVIEW OF SYSTEMS:  CONSTITUTIONAL: No fever, weight loss, or fatigue  EYES: No eye pain, visual disturbances, or discharge  ENMT:  No difficulty hearing, tinnitus, vertigo; No sinus or throat pain  NECK: No pain or stiffness  BREASTS: No pain, masses, or nipple discharge  RESPIRATORY: No cough, wheezing, chills or hemoptysis; No shortness of breath  CARDIOVASCULAR: No chest pain, palpitations, dizziness, or leg swelling  GASTROINTESTINAL: No abdominal or epigastric pain. No nausea, vomiting, or hematemesis; No diarrhea or constipation. No melena or hematochezia.  GENITOURINARY: No dysuria, frequency, hematuria, or incontinence  NEUROLOGICAL: No headaches, memory loss, loss of strength, numbness, or tremors  SKIN: No itching, burning, rashes, or lesions   LYMPH NODES: No enlarged glands  ENDOCRINE: No heat or cold intolerance; No hair loss  MUSCULOSKELETAL: No joint pain or swelling; No muscle, back, or extremity pain  PSYCHIATRIC: No depression, anxiety, mood swings, or difficulty sleeping  HEME/LYMPH: No easy bruising, or bleeding gums  ALLERY AND IMMUNOLOGIC: No hives or eczema      Consultant(s) Notes Reviewed:  [ x ] YES  [ ] NO    PHYSICAL EXAM:  GENERAL: NAD, well-groomed, well-developed  HEAD:  Atraumatic, Normocephalic  EYES: EOMI, PERRLA, conjunctiva and sclera clear  ENMT: No tonsillar erythema, exudates, or enlargement; Moist mucous membranes, Good dentition, No lesions  NECK: Supple, No JVD, Normal thyroid  NERVOUS SYSTEM:  Alert & Oriented X3, Good concentration; Motor Strength 5/5 B/L upper and lower extremities; DTRs 2+ intact and symmetric  CHEST/LUNG: Clear to percussion bilaterally; No rales, rhonchi, wheezing, or rubs  HEART: Regular rate and rhythm; No murmurs, rubs, or gallops  ABDOMEN: Soft, Nontender, Nondistended; Bowel sounds present  EXTREMITIES:  2+ Peripheral Pulses, No clubbing, cyanosis, or edema  LYMPH: No lymphadenopathy noted  SKIN: No rashes or lesions    Care Discussed with Consultants/Other Providers [ x] YES  [ ] NO

## 2020-12-15 NOTE — PROGRESS NOTE ADULT - PROBLEM SELECTOR PLAN 1
Bacteroides fragilis per BC on 10/13  Sputum positive for MRSA and Stenotrophomonas  Aspiration pneumonia vs HCAP .   Repeat BC 10/16 and 11/19 NGTD ,UC on 11/20 NGTD   Infected sacral ulcer- s/p debridement and culture grew Enterococcus (VRE) and Candida  Leukocytosis resolved, Cdiff negative  MRI sacrum/pelvis shows Osteomyelitis  c/w abx per ID Dapto until 12/28. can switch to linezolid 600mg  q12h on 12/14 for 14 days. Fluconazole 200mg daily until 12/29.  `PICC  for extended abx (for total of 6 weeks, thru Dec 29th).  May remove PICC before discharge if transitioning at discharge with oral antibiotics.   Linezolid and Diflucan to continue till 12/29 per Dr. Patricio, ID.

## 2020-12-15 NOTE — PROGRESS NOTE ADULT - SUBJECTIVE AND OBJECTIVE BOX
MARIBETH PADILLA    SCU progress note    INTERVAL HPI/OVERNIGHT EVENTS: ***Temperature elevated 101.1 last evening. Afebrile today. S/P PICC removal yesterday    DNR [x ]   DNI  [  ]  TRIAL OF INTUBATION    Covid - 19 PCR: negative     The 4Ms    What Matters Most: see GOC  Age appropriate Medications/Screen for High Risk Medication: Yes  Mentation: see CAM below  Mobility: defer to physical exam    The Confusion Assessment Method (CAM) Diagnostic Algorithm     1: Acute Onset or Fluctuating Course  - Is there evidence of an acute change in mental status from the patient’s baseline? Did the (abnormal) behavior  fluctuate during the day, that is, tend to come and go, or increase and decrease in severity?  [ ] YES [x ] NO     2: Inattention  - Did the patient have difficulty focusing attention, being easily distractible, or having difficulty keeping track of what was being said?  [ ] YES [x ] NO     3: Disorganized thinking  -Was the patient’s thinking disorganized or incoherent, such as rambling or irrelevant conversation, unclear or illogical flow of ideas, or unpredictable switching from subject to subject?  [ ] YES [x ] NO    4: Altered Level of consciousness?  [ ] YES [x ] NO    The diagnosis of delirium by CAM requires the presence of features 1 and 2 and either 3 or 4.    PRESSORS: [ ] YES [x ] NO  fluconAZOLE   Tablet 200 milliGRAM(s) Oral daily  linezolid    Tablet 600 milliGRAM(s) Oral every 12 hours  rifAXIMin 550 milliGRAM(s) Oral two times a day    Cardiovascular:  Heart Failure  Acute   Acute on Chronic  Chronic       furosemide    Tablet 20 milliGRAM(s) Oral daily    Pulmonary:  ALBUTerol    90 MICROgram(s) HFA Inhaler 2 Puff(s) Inhalation every 6 hours PRN    Hematalogic:  enoxaparin Injectable 50 milliGRAM(s) SubCutaneous daily    Other:  acetaminophen   Tablet .. 650 milliGRAM(s) Oral every 6 hours PRN  ascorbic acid 500 milliGRAM(s) Oral two times a day  chlorhexidine 2% Cloths 1 Application(s) Topical daily  lactobacillus acidophilus 1 Tablet(s) Oral daily  multivitamin/minerals 1 Tablet(s) Oral daily  nystatin Powder 1 Application(s) Topical two times a day  pantoprazole   Suspension 40 milliGRAM(s) Enteral Tube daily  sodium chloride 0.9% lock flush 10 milliLiter(s) IV Push every 1 hour PRN  zinc sulfate 220 milliGRAM(s) Oral daily    acetaminophen   Tablet .. 650 milliGRAM(s) Oral every 6 hours PRN  ALBUTerol    90 MICROgram(s) HFA Inhaler 2 Puff(s) Inhalation every 6 hours PRN  ascorbic acid 500 milliGRAM(s) Oral two times a day  chlorhexidine 2% Cloths 1 Application(s) Topical daily  enoxaparin Injectable 50 milliGRAM(s) SubCutaneous daily  fluconAZOLE   Tablet 200 milliGRAM(s) Oral daily  furosemide    Tablet 20 milliGRAM(s) Oral daily  lactobacillus acidophilus 1 Tablet(s) Oral daily  linezolid    Tablet 600 milliGRAM(s) Oral every 12 hours  multivitamin/minerals 1 Tablet(s) Oral daily  nystatin Powder 1 Application(s) Topical two times a day  pantoprazole   Suspension 40 milliGRAM(s) Enteral Tube daily  rifAXIMin 550 milliGRAM(s) Oral two times a day  sodium chloride 0.9% lock flush 10 milliLiter(s) IV Push every 1 hour PRN  zinc sulfate 220 milliGRAM(s) Oral daily    Drug Dosing Weight  Height (cm): 167.6 (21 Oct 2020 02:26)  Weight (kg): 56.2 (21 Oct 2020 02:26)  BMI (kg/m2): 20 (21 Oct 2020 02:26)  BSA (m2): 1.63 (21 Oct 2020 02:26)    CENTRAL LINE: [ ] YES [x ] NO  LOCATION:   DATE INSERTED:  REMOVE: [ ] YES [ ] NO  EXPLAIN:    TREJO: [ ] YES [x ] NO    DATE INSERTED:  REMOVE:  [ ] YES [ ] NO  EXPLAIN:    PAST MEDICAL & SURGICAL HISTORY:  Anasarca    Pulmonary embolism    DVT (deep venous thrombosis)    SBO (small bowel obstruction)    H/O exploratory laparotomy        PHYSICAL EXAM:    GENERAL: NAD, well-groomed, well-developed  HEAD:  Atraumatic, Normocephalic  EYES: EOMI, PERRLA, conjunctiva and sclera clear  ENMT: No tonsillar erythema, exudates  NECK: Supple, No JVD  NERVOUS SYSTEM:  Alert & Oriented X2, Follow simple commands. Moving all extremites  CHEST/LUNG: Diminished breath sounds bilaterally  HEART: Regular rate and rhythm; No murmurs, rubs, or gallops  ABDOMEN: Soft, Nontender, Nondistended; Bowel sounds present  EXTREMITIES:  +1 edema bilateral hands. 2+ Peripheral Pulses, No clubbing, cyanosis  LYMPH: No lymphadenopathy noted  SKIN: Unstageable sacrum.      LABS:  CBC Full  -  ( 15 Dec 2020 06:33 )  WBC Count : 8.81 K/uL  RBC Count : 2.85 M/uL  Hemoglobin : 8.0 g/dL  Hematocrit : 26.6 %  Platelet Count - Automated : 319 K/uL  Mean Cell Volume : 93.3 fl  Mean Cell Hemoglobin : 28.1 pg  Mean Cell Hemoglobin Concentration : 30.1 gm/dL  Auto Neutrophil # : 5.91 K/uL  Auto Lymphocyte # : 1.81 K/uL  Auto Monocyte # : 0.79 K/uL  Auto Eosinophil # : 0.22 K/uL  Auto Basophil # : 0.05 K/uL  Auto Neutrophil % : 67.1 %  Auto Lymphocyte % : 20.5 %  Auto Monocyte % : 9.0 %  Auto Eosinophil % : 2.5 %  Auto Basophil % : 0.6 %    12-15    146<H>  |  104  |  15  ----------------------------<  72  3.6   |  35<H>  |  0.67    Ca    8.5      15 Dec 2020 06:33  Phos  3.6     12-15  Mg     2.0     -15    TPro  6.6  /  Alb  1.8<L>  /  TBili  1.3<H>  /  DBili  x   /  AST  37  /  ALT  28  /  AlkPhos  279<H>  12-15      Urinalysis Basic - ( 14 Dec 2020 12:17 )    Color: Yellow / Appearance: Clear / S.010 / pH: x  Gluc: x / Ketone: Negative  / Bili: Negative / Urobili: Negative   Blood: x / Protein: 30 mg/dL / Nitrite: Negative   Leuk Esterase: Negative / RBC: 2-5 /HPF / WBC 3-5 /HPF   Sq Epi: x / Non Sq Epi: Occasional /HPF / Bacteria: Moderate /HPF            [  ]  DVT Prophylaxis  [  ]  Nutrition, Brand, Rate         Goal Rate        Abnormal Nutritional Status -  Malnutrition   Cachexia         RADIOLOGY & ADDITIONAL STUDIES:  ***  < from: Xray Chest 1 View- PORTABLE-Routine (Xray Chest 1 View- PORTABLE-Routine in AM.) (20 @ 11:54) >  Heart is likely enlarged. Left PICCline remains.    There are sizable effusions left greater than right. Adjacent infiltrate is possible.    Noted is an IVC filter.    Allowing for technique, study is similar to .    IMPRESSION: Persistent effusions left greater than right.    < end of copied text >  < from: MR Pelvis Bony Only No Cont (20 @ 18:02) >  FINDINGS: The exam is limited by motion artifact. There is a cutaneous ulceration along the posterior aspect of the lower sacrum and coccyx. There is adjacent skin thickening and subcutaneous fat infiltration, consistent with cellulitis. There is high T2 and low T1 marrow signal in the lower sacrum and throughout the coccyx, consistent with osteomyelitis. There is adjacent presacral soft tissue edema and there is a small amount of free fluid in the pelvis. There are no sacroiliac joint effusions. There is diffuse nonspecific subcutaneous soft tissue edema and diffuse myofascial edema. There is partially imaged lower lumbar spondylosis.    IMPRESSION: Osteomyelitis of the lower sacrum and coccyx with overlying cutaneous ulceration, as above.    < end of copied text >    Goals of Care Discussion with Family/Proxy/Other   - see note from

## 2020-12-15 NOTE — PROGRESS NOTE ADULT - PROBLEM SELECTOR PLAN 9
DVT and GI prophylaxis.  OOB daily   Repeat COVID.  Awaiting accepting facility. CM following  Medically stable for discharge. No fevers today can be discharged tomorrow.  Overall prognosis is poor.

## 2020-12-15 NOTE — PROGRESS NOTE ADULT - PROBLEM SELECTOR PLAN 4
Continue wound care.  turn every 2 hours  Continue antibiotics as per Dr Patricio. Needs antibiotics thur 12/29

## 2020-12-15 NOTE — PROGRESS NOTE ADULT - ASSESSMENT
Patient is a 65y old  Female from home, lives with daughter, ambulates with walker with PMH of recurrent SBO's, s/p exp lap, SB resection in 2015, ex lap, ALBINA in 2018, DVT, PE, on Xarelto, IVC filter, chronic leg swelling, and anasarca, Now send in to the ER by her PCP, Dr. Ross, for evaluation of  generalized weakness and hypotension BP of 80/40 during office visit. On admission, she found to have no fever and BP was in lower normal range and no Leukocytosis. The CXR is clear and CT abd/pelvis consistent with Ileus. The ID consult requested to assist with evaluation of infectious etiology of episode of hypotension. Found to have Bacteroides bacteremia and now developed septic shock on Cefepime and Flagyl. Hence transferred to ICU. 10/20/20.    # Hypotension  at office- BP of 80/40 - resolved   #  Bacteroides fragilis Bacteremia - 10/13/20 - ? source most likely GI- Repeat Blood Cxs have NGTD 10/16/20  # Ileus  - h/o recurrent SBO and s/p EXp. LAp  # COVID 19 negative   # Septic shock ( Hypothermia + hypotensive)- transferred to ICU since requiring pressor- source GI, The CT abd/pelvis shows nonspecific enterocolitis, noninfectious inflammatory bowel disease, or ischemic bowel, along with ileus.   # Pneumonia- HCAP vs Aspiration - on CXR 10/24 and CT chest 10/21- sputum Cx grew Methicillin resistant Staphylococcus aureus  and Stenotrophomonas maltophilia.  # S/p extubated 11/9/20  # Infected sacral ulcer- s/p debridement and culture grew Enterococcus, VRE and Candida, sensitivity is pending- The MRI of pelvis shows  Osteomyelitis of the lower sacrum and coccyx with overlying cutaneous ulceration.   # Fever- 11/8 and 11/19- BCX NGTD 11/19 - The CXR shows Improvement in bilateral airspace disease with persistent bibasilar consolidation. She is s/p removal of velazquez catheter and passed TOV.  # HCAP- 11/27/20,  Intermittent fever and CXR shows  bilateral consolidations right greater than left. - The procalcitonin level is 1.55  # s/p PICC line placement 11/30  # Persistent effusions left greater than right      would recommend:    1. Monitor Temp. and c/w supportive care   2. Continue oral Linezolid  and oral fluconazole until  12/29/20  3. Aspiration precaution  4. Frequent repositioning     Attending Attestation:    Spent more than 45 minutes on total encounter, more than 50 % of the visit was spent counseling and/or coordinating care by the Attending physician.   Patient is a 65y old  Female from home, lives with daughter, ambulates with walker with PMH of recurrent SBO's, s/p exp lap, SB resection in 2015, ex lap, ALBINA in 2018, DVT, PE, on Xarelto, IVC filter, chronic leg swelling, and anasarca, Now send in to the ER by her PCP, Dr. Ross, for evaluation of  generalized weakness and hypotension BP of 80/40 during office visit. On admission, she found to have no fever and BP was in lower normal range and no Leukocytosis. The CXR is clear and CT abd/pelvis consistent with Ileus. The ID consult requested to assist with evaluation of infectious etiology of episode of hypotension. Found to have Bacteroides bacteremia and now developed septic shock on Cefepime and Flagyl. Hence transferred to ICU. 10/20/20.    # Hypotension  at office- BP of 80/40 - resolved   #  Bacteroides fragilis Bacteremia - 10/13/20 - ? source most likely GI- Repeat Blood Cxs have NGTD 10/16/20  # Ileus  - h/o recurrent SBO and s/p EXp. LAp  # COVID 19 negative   # Septic shock ( Hypothermia + hypotensive)- transferred to ICU since requiring pressor- source GI, The CT abd/pelvis shows nonspecific enterocolitis, noninfectious inflammatory bowel disease, or ischemic bowel, along with ileus.   # Pneumonia- HCAP vs Aspiration - on CXR 10/24 and CT chest 10/21- sputum Cx grew Methicillin resistant Staphylococcus aureus  and Stenotrophomonas maltophilia.  # S/p extubated 11/9/20  # Infected sacral ulcer- s/p debridement and culture grew Enterococcus, VRE and Candida, sensitivity is pending- The MRI of pelvis shows  Osteomyelitis of the lower sacrum and coccyx with overlying cutaneous ulceration.   # Fever- 11/8 and 11/19- BCX NGTD 11/19 - The CXR shows Improvement in bilateral airspace disease with persistent bibasilar consolidation. She is s/p removal of velazquez catheter and passed TOV.  # HCAP- 11/27/20,  Intermittent fever and CXR shows  bilateral consolidations right greater than left. - The procalcitonin level is 1.55  # s/p PICC line placement 11/30  # Persistent effusions left greater than right      would recommend:    1. Monitor Temp. and c/w supportive care   2. Continue oral Linezolid  and oral fluconazole until  12/29/20  3. Aspiration precaution  4. Frequent repositioning     Attending Attestation:    Spent more than 35 minutes on total encounter, more than 50 % of the visit was spent counseling and/or coordinating care by the Attending physician.

## 2020-12-15 NOTE — PROGRESS NOTE ADULT - ATTENDING COMMENTS
-continue antibx  -for PICC placement 11/30  -change position frequently
Assessment:  1. Acute Respiratory Failure  2. Septic Shock   3. Bacteremia  4. Anemia  5. DVT/PE  6. Acute liver failure - Transaminitis -? Budd Chiari due to thrombosed ivc filter  7. Acute renal failure  8. Hepatic encephalopathy   9. MOSF  10. Heart failure with reduced EF    Plan:  -CXR improved  -Failed SBT this morning  -continue antibx  -hemodynamic support  -back on vasopressors  -poor prognosis  -severe thrombocytopenia  -not on chemical anticoag  -continue lactulose.  -heme f/u noted  -hepatology f/u noted  -Prognosis is very poor
Assessment:  1. Acute Respiratory Failure  2. Septic Shock   3. Bacteremia  4. Anemia  5. DVT/PE  6. Acute liver failure - Transaminitis -? Budd Chiari due to thrombosed ivc filter  7. Acute renal failure  8. Hepatic encephalopathy   9. MOSF  10. Heart failure with reduced EF    Plan:  -Daily weaning trial  -Failing SBT, unable to extubate today may need tracheostomy   -continue antibx, holding vancomycin given elevated trough  -Check vancomycin trough daily  -Lasix 120 mg IVPB and monitor urine output   -hemodynamic support  -Vasopressors as needed  -severe thrombocytopenia  -not on chemical anticoag  -continue lactulose.  -heme f/u noted  -hepatology f/u noted  -Prognosis is very poor, patient has multiple organ dysfunction, specially heart failure with severe reduced EF and liver dysfunction. Will await family meeting today regarding goals of care.
IMP: This is a 65 yr old woman  from home, lives with daughter, ambulates with walker with PMH of recurrent SBO's, s/p exp lap, SB resection in 2015, ex lap, ALBINA in 2018, DVT, PE, on Xarelto, IVC filter, chronic leg swelling, and anasarca, came in to the ED due to hypotension during office visit. Patient was found to be bacteremic with bacteroids fragilis. Admitted in ICU due to hypotension and hypothermia secondary to septic shock requiring ressors meropenem , midodrine and Solu-cortef.  BCx X2 negative.       - Acute Resp Failure  - Septic Shock   - Bacteremia  - Anemia  - DVT/PE  - Transaminitis  - liver failure  -? Budd Chiari   - thrombosed ivc filter  - coagulopathy   - Hepatic encephalopathy       Plan:  -surgical f/u noted.. will refer for possible TIPS when more stable  -continue vent support  -continue antibx  -hemodynamic support  -poor prognosis  -severe thrombocytopenis  -not on chemical anticoag  -continue lactulose.
pt with sacral decubitus ulcer that need debridement  Discussed the options with the pt and the daughter  Informed consent obtained
Assessment:  1. Acute Respiratory Failure  2. Septic Shock   3. Bacteremia  4. Anemia  5. DVT/PE  6. Acute liver failure  7. Acute renal failure  8. Hepatic encephalopathy   9. MOSF  10. Heart failure with reduced EF    Plan:  -Extubated 11/9   -continue antibx, dose antibiotics by level as kidney function is improving, last day today   -Cont. diuresis and aim for negative fluid balance  -hemodynamic support  -thrombocytopenia slowly improving  -continue lactulose.  -heme f/u noted  -hepatology f/u noted  -Surgery consult for sacral wound  - Obtain swallow evaluation, PT evaluation  -Prognosis is very poor given multiple organ dysfunction
Assessment:  1. Acute Respiratory Failure  2. Septic Shock   3. Bacteremia  4. Anemia  5. DVT/PE  6. Acute liver failure - Transaminitis -? Budd Chiari due to thrombosed ivc filter  7. Acute renal failure  8. Hepatic encephalopathy   9. MOSF  10. Heart failure with reduced EF    Plan:  -Daily weaning trial  -Plan for extubation today  -CXR improved  -continue antibx, dose antibiotics by level as kidney function is improving  -Cont. diuresis and aim for negative fluid balance  -hemodynamic support  -thrombocytopenia slowly improving  -continue lactulose.  -heme f/u noted  -hepatology f/u noted  -Prognosis is very poor given multiple organ dysfunction
IMP: This is a 65 yr old woman  from home, lives with daughter, ambulates with walker with PMH of recurrent SBO's, s/p exp lap, SB resection in 2015, ex lap, ALBINA in 2018, DVT, PE, on Xarelto, IVC filter, chronic leg swelling, and anasarca, came in to the ED due to hypotension during office visit. Patient was found to be bacteremic with bacteroids fragilis. Admitted in ICU due to hypotension and hypothermia secondary to septic shock requiring ressors meropenem , midodrine and Solu-cortef.  BCx X2 negative.       - Acute Resp Failure  - Septic Shock   - Bacteremia  - Anemia  - DVT/PE  - Transaminitis  - liver failure  -? Budd Chiari   - thrombosed ivc filter  - coagulopathy   - Hepatic encephalopathy       Plan:  -surgical f/u noted.. will refer for possible TIPS when more stable  -continue vent support  -continue antibx  -hemodynamic support  -poor prognosis  -severe thrombocytopenis  -not on chemical anticoag  -continue lactulose
IMP: This is a 65 yr old woman  from home, lives with daughter, ambulates with walker with PMH of recurrent SBO's, s/p exp lap, SB resection in 2015, ex lap, ALBINA in 2018, DVT, PE, on Xarelto, IVC filter, chronic leg swelling, and anasarca, came in to the ED due to hypotension during office visit. Patient was found to be bacteremic with bacteroids fragilis. Admitted in ICU due to hypotension and hypothermia secondary to septic shock requiring ressors meropenem , midodrine and Solu-cortef.  BCx X2 negative.     - Septic Shock   - Bacteremia  - Anemia  - DVT/PE  - Transaminitis  - liver failure  -? Budd Chiari   -thrombosed ivc filter  -coagulopathy   -hepatic encephalopathy       Plan:  -surgical f/u noted.. will refer for possible TIPS when more stable  -pat is hypothermic , coagulopathic due to sepsis  -continue antibx as per ID  -Palliative noted  -Hepatology eval noted  -continue pressors to maintain MAP>65  -therapeutic anticoag  -lactulose titrate to 4 BM/ day  -continue therapeutic lovenox   -PPI  -NGT  -hemodynamic support   -bear hugger .
IMP: This is a 65 yr old woman  from home, lives with daughter, ambulates with walker with PMH of recurrent SBO's, s/p exp lap, SB resection in 2015, ex lap, ALBINA in 2018, DVT, PE, on Xarelto, IVC filter, chronic leg swelling, and anasarca, came in to the ED due to hypotension during office visit. Patient was found to be bacteremic with bacteroids fragilis. Admitted in ICU due to hypotension and hypothermia secondary to septic shock requiring ressors meropenem , midodrine and Solu-cortef.  BCx X2 negative.     - Septic Shock   - Bacteremia  - Anemia  - DVT/PE  - Transaminitis  -liver failure  -? Budd Chiari   -thrombosed ivc filter  -coagulopathy   -hepatic encephalopathy       Plan:  -surgical f/u noted.. will refer for possible TIPS when more stable  -pat is hypothermic , coagulopathic due to sepsis   -ffp to reverse coagulopathy  -continue antibx  -Palliative noted  -Hepatology eval noted  -continue pressors to maintain MAP>65  -therapeutic anticoag  -[facility] lab unable to do coag test on sample, ask to send to core lab   -d/c anticoag   -PPI  -NGT   -NGT for lactulose titrate to 4 BM/ day  -hemodynamic support   -bear hugger
Assessment:  1. Acute Resp Failure  2. Septic Shock   3. Bacteremia  4. Anemia  5. DVT/PE  6. Acute liver failure - Transaminitis -? Budd Chiari due to thrombosed ivc filter  7. Acute renal failure  8. Hepatic encephalopathy   9. MOSF  10. Heart failure with reduced EF    Plan:  - CXR with left lung collapse  - Placed back on full vent support  -continue antibx  -hemodynamic support  -poor prognosis  -severe thrombocytopenia  -not on chemical anticoag  -continue lactulose.  -heme f/u noted  -hepatology f/u noted  -Prognosis is very poor
Assessment:  1. Acute Respiratory Failure  2. Septic Shock   3. Bacteremia  4. Anemia  5. DVT/PE  6. Acute liver failure - Transaminitis -? Budd Chiari due to thrombosed ivc filter  7. Acute renal failure  8. Hepatic encephalopathy   9. MOSF  10. Heart failure with reduced EF    Plan:  -Daily weaning trial  -Failing SBT, unable to extubate today may need tracheostomy if not weanable  -continue antibx, holding vancomycin given elevated trough  -Check vancomycin trough daily  -Cont. diuresis  -hemodynamic support  -Vasopressors to maintain MAP >65  -severe thrombocytopenia  -not on chemical anticoag  -continue lactulose.  -heme f/u noted  -hepatology f/u noted  -Prognosis is very poor, patient has multiple organ dysfunction, specially heart failure with severe reduced EF and liver dysfunction.
Assessment:  1. Acute Respiratory Failure  2. Septic Shock   3. Bacteremia  4. Anemia  5. DVT/PE  6. Acute liver failure - Transaminitis -? Budd Chiari due to thrombosed ivc filter  7. Acute renal failure  8. Hepatic encephalopathy   9. MOSF  10. Heart failure with reduced EF    Plan:  -Daily weaning trial  -Mechanical ventilation for now  - Keep left side up, cont. chest PT,   -continue antibx, dose antibiotics by level as kidney function is improving  -Cont. diuresis and aim for negative fluid balance  -hemodynamic support  -Off vasopressors this morning  -thrombocytopenia slowly improving  - S/p 1 unit prbc transfusion, appropriate rise in hgb  -continue lactulose.  -heme f/u noted  -hepatology f/u noted  -Prognosis is very poor, patient has multiple organ dysfunction, specially heart failure with severe reduced EF and liver dysfunction.
Assessment:  1. Acute Respiratory Failure  2. Septic Shock   3. Bacteremia  4. Anemia  5. DVT/PE  6. Acute liver failure - Transaminitis -? Budd Chiari due to thrombosed ivc filter  7. Acute renal failure  8. Hepatic encephalopathy   9. MOSF  10. Heart failure with reduced EF    Plan:  -Daily weaning trial  -Mechanical ventilation for now  -continue antibx, dose antibiotics by level as kidney function is improving  -Cont. diuresis and aim for negative fluid balance  -hemodynamic support  -Vasopressors to maintain MAP >65  -severe thrombocytopenia  -not on chemical anticoag  - Dropped hgb, monitor for signs of bleeding  - Transfuse 1 unit PRBC today  -continue lactulose.  -heme f/u noted  -hepatology f/u noted  -Prognosis is very poor, patient has multiple organ dysfunction, specially heart failure with severe reduced EF and liver dysfunction.
IMP: This is a 65 yr old woman  from home, lives with daughter, ambulates with walker with PMH of recurrent SBO's, s/p exp lap, SB resection in 2015, ex lap, ALBINA in 2018, DVT, PE, on Xarelto, IVC filter, chronic leg swelling, and anasarca, came in to the ED due to hypotension during office visit. Patient was found to be bacteremic with bacteroids fragilis. Admitted in ICU due to hypotension and hypothermia secondary to septic shock requiring ressors meropenem , midodrine and Solu-cortef.  BCx X2 negative.       - Acute Resp Failure  - Septic Shock   - Bacteremia  - Anemia  - DVT/PE  - Transaminitis  - liver failure  -? Budd Chiari   - thrombosed ivc filter  - coagulopathy   - Hepatic encephalopathy   - MOSF      Plan:  -surgical f/u noted.. will refer for possible TIPS when more stable  -continue vent support  -continue antibx  -hemodynamic support  -poor prognosis  -severe thrombocytopenis  -not on chemical anticoag  -continue lactulose.  -heme f/u noted  -hepatology f/u noted
Assessment:  1. Acute Respiratory Failure  2. Septic Shock   3. Bacteremia  4. Anemia  5. DVT/PE  6. Acute liver failure  7. Acute renal failure  8. Hepatic encephalopathy   9. MOSF  10. Heart failure with reduced EF    Plan:  -Extubated 11/9   -Last day of antibiotics  -Cont. diuresis and aim for negative fluid balance  -hemodynamic support  -thrombocytopenia improving  -continue lactulose to titrate upto 2-3 bowel movements  -heme f/u noted  -hepatology f/u noted  -Surgery consult for sacral wound, plan for debridement today  -swallow evaluation, PT evaluation  -Prognosis is very poor given multiple organ dysfunction
Assessment:  1. Acute Respiratory Failure  2. Septic Shock   3. Bacteremia  4. Anemia  5. DVT/PE  6. Acute liver failure  7. Acute renal failure  8. Hepatic encephalopathy   9. MOSF  10. Heart failure with reduced EF    Plan:  -Extubated 11/9   -Last day of antibiotics  -Cont. diuresis and aim for negative fluid balance  -hemodynamic support  -thrombocytopenia improving  -continue lactulose to titrate upto 2-3 bowel movements  -heme f/u noted  -hepatology f/u noted  -Surgery consult for sacral wound, plan for debridement today  -swallow evaluation, PT evaluation  -Prognosis is very poor given multiple organ dysfunction
Assessment:  1. Septic shock  2. Bacteremia   3. Lactic acidosis  - with high anion gap acidosis   4. Anemia  5. Thrombocytopenia   6. VTE   7. Abdominal distension - ileus vs small bowel obstruction    Plan  - Cont. vasopressor support  - broad spectrum antibiotics  - repeat CT scan with PO and IV contrast  - IV fluid resuscitation  - repeats labs including lactate level  - NPO for now   - Check TFTs  - supplement electrolytes   - Monitor urine output   - D/c xarelto for now, as may need intervention pending CT scan, has IVC filter in place
Case d/w me. Agree with above
Case d/w me. Agree with above
IMP: This is a 65 yr old woman  from home, lives with daughter, ambulates with walker with PMH of recurrent SBO's, s/p exp lap, SB resection in 2015, ex lap, ALBINA in 2018, DVT, PE, on Xarelto, IVC filter, chronic leg swelling, and anasarca, came in to the ED due to hypotension during office visit. Patient was found to be bacteremic with bacteroids fragilis. Admitted in ICU due to hypotension and hypothermia secondary to septic shock requiring pressors meropenem , midodrine and Solu-cortef.  BCx X2 negative.     - Septic Shock due to bacteroides fragilis bacteremia  - Coagulopathy with ?Budd Chiari, thrombosed IVC filter,  h/o DVT/PE  - Transaminitis with hepatic encephalopathy - etiology unclear        Plan:    -continue antibx as per ID  -Palliative noted  -Hepatology eval noted  -continue pressors to maintain MAP>65  -lactulose via NGT titrate to 4 BM/ day, rifaximin.   -holding therapeutic lovenox due to coagulopathy and thrombocytopenia  -PPI propphy  - NGT feeds  standard vent bundle
IMP: This is a 65 yr old woman  from home, lives with daughter, ambulates with walker with PMH of recurrent SBO's, s/p exp lap, SB resection in 2015, ex lap, ALBINA in 2018, DVT, PE, on Xarelto, IVC filter, chronic leg swelling, and anasarca, came in to the ED due to hypotension during office visit. Patient was found to be bacteremic with bacteroids fragilis. Admitted in ICU due to hypotension and hypothermia secondary to septic shock requiring pressors meropenem , midodrine and Solu-cortef.  BCx X2 negative.     - Septic Shock due to bacteroides fragilis bacteremia, now complicated by GNR in sputum with thick secretions  - Coagulopathy with ?Budd Chiari, thrombosed IVC filter,  h/o DVT/PE  - Transaminitis with hepatic encephalopathy - etiology unclear        Plan:    -continue antibx as per ID, transitioned to levofloxacin and Vanco for stenotrophomonas and MRSA in sputum  -Palliative noted  -Hepatology eval noted  -continue pressors to maintain MAP>65  - start albumin and lasix, pt grossly edematous with low albumin and 3rd spacing  -lactulose via NGT titrate to 4 BM/ day, rifaximin.   -holding therapeutic lovenox due to coagulopathy and thrombocytopenia  -PPI prophy  - NGT feeds  standard vent bundle
IMP: This is a 65 yr old woman  from home, lives with daughter, ambulates with walker with PMH of recurrent SBO's, s/p exp lap, SB resection in 2015, ex lap, ALBINA in 2018, DVT, PE, on Xarelto, IVC filter, chronic leg swelling, and anasarca, came in to the ED due to hypotension during office visit. Patient was found to be bacteremic with bacteroids fragilis. Admitted in ICU due to hypotension and hypothermia secondary to septic shock requiring ressors meropenem , midodrine and Solu-cortef.  BCx X2 negative.     - Septic Shock   - Bacteremia  - Anemia  - DVT/PE  - Transaminitis      Plan:  -surgical f/u noted.. will refer for possible TIPS when more stable  -continue pressors to maintain MAP>65  -continue antibx  -therapeutic anticoag  -changed NOAC to therapeutic loenox  -PPI  -diet  -hemodynamic support
IMP: 65F with multiple blood clots in the past, admitted with bacteroides fragilis bacteremia and septic shock, with MODS and complicated by stenotrophomonas and MRSA PNA.     - Septic Shock due to bacteroides fragilis bacteremia, now complicated by PNA and sputum with thick secretions due to stenotrophomonas and MRSA  - Coagulopathy/thrombocytopenia with ?Budd Chiari, thrombosed IVC filter,  h/o DVT/PE - improving  - Transaminitis with hepatic encephalopathy - etiology unclear - improving  - Cardiomyopathy with EF 15%        Plan:    -continue antibx as per ID, transitioned to levofloxacin and Vanco for stenotrophomonas and MRSA in sputum  -Palliative noted  -Hepatology eval noted  -continue pressors to maintain MAP>65  - cont albumin and lasix, pt grossly edematous with low albumin and 3rd spacing  -lactulose via NGT titrate to 4 BM/ day, rifaximin.   -holding therapeutic lovenox due to persistent likely septic thrombocytopenia  - check rheum/vasculitic panel  -PPI prophy  - NGT feeds  standard vent bundle
IMP: This is a 65 yr old woman  from home, lives with daughter, ambulates with walker with PMH of recurrent SBO's, s/p exp lap, SB resection in 2015, ex lap, ALBINA in 2018, DVT, PE, on Xarelto, IVC filter, chronic leg swelling, and anasarca, came in to the ED due to hypotension during office visit. Patient was found to be bacteremic with bacteroids fragilis. Admitted in ICU due to hypotension and hypothermia secondary to septic shock requiring pressors meropenem , midodrine and Solu-cortef.  BCx X2 negative.     - Septic Shock due to bacteroides fragilis bacteremia  - Coagulopathy with ?Budd Chiari, thrombosed IVC filter,  h/o DVT/PE  - Transaminitis with hepatic encephalopathy - etiology unclear        Plan:    -continue antibx as per ID  -Palliative noted  -Hepatology eval noted  -continue pressors to maintain MAP>65  -lactulose via NGT titrate to 4 BM/ day, rifaximin. ?TIPS  -continue therapeutic lovenox   -PPI propphy  standard vent bundle
IMP: This is a 65 yr old woman  from home, lives with daughter, ambulates with walker with PMH of recurrent SBO's, s/p exp lap, SB resection in 2015, ex lap, ALBINA in 2018, DVT, PE, on Xarelto, IVC filter, chronic leg swelling, and anasarca, came in to the ED due to hypotension during office visit. Patient was found to be bacteremic with bacteroids fragilis. Admitted in ICU due to hypotension and hypothermia secondary to septic shock requiring ressors meropenem , midodrine and Solu-cortef.  BCx X2 negative.     - Septic Shock   - Bacteremia  - Anemia  - DVT/PE  - Transaminitis      Plan:  -surgical f/u noted.. will refer for possible TIPS when more stable  -pat is hypothermic , coagulopathic due to sepsis   -continue antibx  -Heme f/u noted  -Palliative noted  -Hepatology eval noted  -continue pressors to maintain MAP>65  -therapeutic anticoag  -[facility] lab unable to do coag test on sample, ask to send to core lab   -d/c anticoag   -PPI  -npo  -hemodynamic support .
IMP: 65F with multiple blood clots in the past, admitted with bacteroides fragilis bacteremia and septic shock, with MODS and complicated by stenotrophomonas and MRSA PNA.     - Septic Shock due to bacteroides fragilis bacteremia, now complicated by PNA and sputum with thick secretions due to stenotrophomonas and MRSA  - Coagulopathy/thrombocytopenia with ?Budd Chiari, thrombosed IVC filter,  h/o DVT/PE - improving  - Transaminitis with hepatic encephalopathy - etiology unclear - improving  - Cardiomyopathy with EF 15%        Plan:    -continue antibx as per ID, transitioned to levofloxacin and Vanco for stenotrophomonas and MRSA in sputum  -Palliative noted  -Hepatology eval noted  -continue pressors to maintain MAP>65  - cont albumin and lasix, pt grossly edematous with low albumin and 3rd spacing  -lactulose via NGT titrate to 4 BM/ day, rifaximin.   -holding therapeutic lovenox due to persistent likely septic thrombocytopenia  - anticardiolipin +, consider CAPS  -PPI prophy  - NGT feeds  standard vent bundle
Cont. daptomycin, unable to do linezolid due to long term myelosuppression   Discharge planning to facility pending placement
Transaminitis.  Plan: Probably secondary to portal hypertension.   LFTs remain elevated. Follow daily  Continue rifaximin.  lactulose to keep bowel movement 2-3 per day.  Hepatology following.
Cont. daptomycin  Discharge planning to facility pending placement
Cont. to monitor for fevers  Moisture control  Surgery follow up requested for sacral wound  Cont. daptomycin  Pelvic xray
D/c caroline, change to primafit   Moisture control  Febrile overnight  Cont. daptomycin  F/u cultures
d
·  Problem: Septic shock.  Plan: Secondary to bacteremia . Bacteroides fragilis per BC on 10/13  Sputum positive for MRSA and Stenotrophomonas  Aspiration pneumonia vs HCAP   Repeat BC 10/16 and 11/19 NGTD   UC on 11/20 NGTD   Infected sacral ulcer- s/p debridement and culture grew Enterococcus (VRE) and Candida ( sensitivity is pending)  Leukocytosis resolved, Cdiff negative  on Dapto and Fluconazole  MRI sacrum/pelvis shows OM   Echo and PAVAN negative for endocarditis  PICC  for extended abx (for total of 6 weeks, thru Dec 28th). IR informed. PICC in IR likely Friday.   Monitor CK weekly while on Dapto  velazquez  discontinued 11/19.   Permafit as needed.   ID Dr. Patricio following.
-PAVAN neg endocarditis  -MM + osteo  -IR for PICC  -will antibx Dec 28
Cont. daptomycin  Discharge planning to facility pending placement
Dispo planning
Drop in H/H  -transfuse 2 units PRBC  -monitor for evidence of bleed  -stool for FOBT
Restart anticoagulation   Monitor LFTs   D/c planning to [facility]
Sacral wound.  Plan: Continue wound care.  turn every 2 hours  Continue antibiotics as per Dr Patricio. Needs antibiotics thur 12/29.  for placement
·  Problem: Septic shock.  Plan: Secondary to bacteremia . Bacteroides fragilis per BC on 10/13  Sputum positive for MRSA and Stenotrophomonas  Aspiration pneumonia vs HCAP .   Repeat BC 10/16 and 11/19 NGTD ,UC on 11/20 NGTD   Infected sacral ulcer- s/p debridement and culture grew Enterococcus (VRE) and Candida ( sensitivity is pending)  Leukocytosis resolved, Cdiff negative  MRI sacrum/pelvis shows OM   c/w abx per ID   `PICC  for extended abx (for total of 6 weeks, thru Dec 28th). Placed today in IR.
·  Problem: Septic shock.  Plan: Secondary to bacteremia . Bacteroides fragilis per BC on 10/13  Sputum positive for MRSA and Stenotrophomonas  Aspiration pneumonia vs HCAP .   Repeat BC 10/16 and 11/19 NGTD ,UC on 11/20 NGTD   Infected sacral ulcer- s/p debridement and culture grew Enterococcus (VRE) and Candida ( sensitivity is pending)  Leukocytosis resolved, Cdiff negative  MRI sacrum/pelvis shows OM   c/w abx per ID   `PICC  for extended abx (for total of 6 weeks, thru Dec 28th). Placed today in IR.   Needs Dapto until 12/28. May be switched to Linezolid for the last 2 weeks of treatment as per Dr Patricio.
·  Problem: Septic shock.  Plan: Secondary to bacteremia . Bacteroides fragilis per BC on 10/13  Sputum positive for MRSA and Stenotrophomonas  Aspiration pneumonia vs HCAP .   Repeat BC 10/16 and 11/19 NGTD ,UC on 11/20 NGTD   Infected sacral ulcer- s/p debridement and culture grew Enterococcus (VRE) and Candida ( sensitivity is pending)  Leukocytosis resolved, Cdiff negative  MRI sacrum/pelvis shows OM   c/w abx per ID Dapto until 12/28. can switch to linezolid on 12/14 for 14 days.  `PICC  for extended abx (for total of 6 weeks, thru Dec 28th).
·  Problem: Septic shock.  Plan: Secondary to bacteremia . Bacteroides fragilis per BC on 10/13  Sputum positive for MRSA and Stenotrophomonas  Aspiration pneumonia vs HCAP .   Repeat BC 10/16 and 11/19 NGTD ,UC on 11/20 NGTD   Infected sacral ulcer- s/p debridement and culture grew Enterococcus (VRE) and Candida ( sensitivity is pending)  Leukocytosis resolved, Cdiff negative  MRI sacrum/pelvis shows OM   c/w abx per ID Dapto until 12/28. can switch to linezolid on 12/14 for 14 days.  `PICC  for extended abx (for total of 6 weeks, thru Dec 28th).
Antibiotics per ID  Will discuss about oral regimen for discharge   Hemodynamically stable   Decrease lasix to once a day  Dispo planning to sub acute rehab
Check CPK tomorrow as on Daptomycin   cont. antibiotics   Dispo pending placement
·  Problem: Encephalopathy.  Plan: Multifactorial: Multiple organ failure, Elevated LFTS, Elevated amnionia level and baseline dementia.  Continues to Improve.        ·  Problem: Discharge planning issues.  Plan: Needs Daptomycin until 12/28  Facilities will not give Dapto  Can switch to linezolid on 12/14-12/28.
·  Problem: Discharge planning issues. Plan: Needs Daptomycin until 12/28  Facilities will not give Dapto  Can switch to linezolid on 12/14-12/28.
·  Problem: Multi-organ failure with heart failure.  Plan: Echo shows improved EF   s/p IV Lasix 11/27 for pleural effusions;   Currently appears euvolemic  Lasix po 20mg with hold parameters   Continue supportive care.
Cont. to monitor for fevers  Moisture control  Surgery follow up requested for sacral wound  Cont. daptomycin  F/u cultures
·  Problem: Severe protein-calorie malnutrition.  Plan: Encourage oral intake.  Protein 5.6 albumin  2.4  diet as above  Needs assistance with eating.       ·  Problem: Sacral wound.  Plan: Unstage-able  sacral wound. S/P surgical debridement  final sacral wound culture, grew Enterococcus and Candida   Fluconazole and Linezolid 600 initiated as per ID (Dr Patricio)  Continue wound care as ordered  Turn and position every 2 hours.
-continue antibx  -for placement
Problem/Plan - 1:  ·  Problem: Septic shock.  Plan: Secondary to bacteremia.  Sputum positive for MRSA and Stenotrophomas  All antibiotics completed.      Problem/Plan - 2:  ·  Problem: Hypotension.  Plan: Continue midodrine 10mg every 8 hours.  Not able to wean of midodrine at this time.      Problem/Plan - 3:  ·  Problem: Multi-organ failure with heart failure.  Plan: Echo results above. EF 15-20%. Severe left ventricular dysfunction.  Continue supportive care.      Problem/Plan - 4:  ·  Problem: Transaminitis.  Plan: Probably secondary to portal hypertension.   LFTs remain elevated. Follow daily  Continue rifaximin.  lactulose to keep bowel movement 2-3 per day.  Hepatology following.
·  Problem: Discharge planning issues.  Plan: Needs Daptomycin until 12/29  Facilities will not give Dapto  Can switch to linezolid on 12/14-12/29.  Diflucan to continue till 12/29.
·  Problem: Septic shock.  Plan: Secondary to bacteremia . Bacteroides fragilis per BC on 10/13  Sputum positive for MRSA and Stenotrophomas  Aspiration pneumonia vs HCAP   Repeat BC 10/16 and 11/19 NGTD   UC on 11/20 NGTD    Infected sacral ulcer- s/p debridement and culture grew Enterococcus (VRE) and Candida ( sensitivity is pending)  Leukocytosis resolved  Cdiff negative  on Dapto and Fluconazole  MRI sacrum , r/o OM   F/u Echo , eval for endocarditis  Monitor CK weekly while on Dapto  velazquez  discontinued 11/19.   Permafit as needed.
·  Problem: Septic shock.  Plan: Secondary to bacteremia . Bacteroides fragilis per BC on 10/13  Sputum positive for MRSA and Stenotrophomonas  Aspiration pneumonia vs HCAP   Repeat BC 10/16 and 11/19 NGTD   UC on 11/20 NGTD   Infected sacral ulcer- s/p debridement and culture grew Enterococcus (VRE) and Candida ( sensitivity is pending)  Leukocytosis resolved, Cdiff negative  on Dapto and Fluconazole  MRI sacrum/pelvis shows OM   Echo and PAVAN negative for endocarditis  PICC  for extended abx (for total of 6 weeks, thru Dec 28th). IR informed. PICC in IR likely Friday.   Monitor CK weekly while on Dapto  velazquez  discontinued 11/19.   Permafit as needed. .on hold due to fever   ID Dr. Patricio following.

## 2020-12-16 NOTE — PROGRESS NOTE ADULT - SUBJECTIVE AND OBJECTIVE BOX
Patient is seen and examined at the bed side,  is afebrile now, spiked fever  last night. The WBC count and creatinine stay normal.       REVIEW OF SYSTEMS: All other review systems are negative      ALLERGIES: No Known Allergies      Vital Signs Last 24 Hrs  T(C): 36.9 (16 Dec 2020 13:08), Max: 38.7 (16 Dec 2020 05:28)  T(F): 98.5 (16 Dec 2020 13:08), Max: 101.6 (16 Dec 2020 05:28)  HR: 98 (16 Dec 2020 13:08) (98 - 106)  BP: 94/65 (16 Dec 2020 13:08) (94/65 - 106/62)  BP(mean): --  RR: 18 (16 Dec 2020 13:08) (18 - 19)  SpO2: 99% (16 Dec 2020 13:08) (93% - 100%)      PHYSICAL EXAM:  GENERAL: Not in distress, off oxygen   CHEST/LUNG: Not using accessory muscles   HEART: s1 and s2 present  ABDOMEN:  Nontender and  Nondistended  EXTREMITIES: B/L UE edematous improved  CNS: Awake and Alert         LABS:                        7.5    9.74  )-----------( 310      ( 16 Dec 2020 07:22 )             25.3                           8.0    8.81  )-----------( 319      ( 15 Dec 2020 06:33 )             26.6                           8.0    10.27 )-----------( 314      ( 14 Dec 2020 08:36 )             26.8                9.4    13.14 )-----------( 149      ( 14 Nov 2020 07:58 )             28.7       12-16    144  |  106  |  15  ----------------------------<  85  3.8   |  33<H>  |  0.76    Ca    8.3<L>      16 Dec 2020 07:22  Phos  3.8     12-16  Mg     2.0     12-16    TPro  6.6  /  Alb  1.8<L>  /  TBili  1.4<H>  /  DBili  x   /  AST  37  /  ALT  26  /  AlkPhos  273<H>  12-16 12-15    146<H>  |  104  |  15  ----------------------------<  72  3.6   |  35<H>  |  0.67    Ca    8.5      15 Dec 2020 06:33  Phos  3.6     12-15  Mg     2.0     12-15    TPro  6.6  /  Alb  1.8<L>  /  TBili  1.3<H>  /  DBili  x   /  AST  37  /  ALT  28  /  AlkPhos  279<H>  12-15      11-06    138  |  104  |  37<H>  ----------------------------<  81  3.9   |  25  |  2.37<H>    Ca    8.1<L>      06 Nov 2020 06:20  Phos  3.4     11-06  Mg     2.0     11-06    TPro  5.1<L>  /  Alb  2.8<L>  /  TBili  9.2<H>  /  DBili  x   /  AST  233<H>  /  ALT  99<H>  /  AlkPhos  96  11-06      Vancomycin Level, Trough (11.07.20 @ 21:49)   Vancomycin Level, Trough: 16.1:     Vancomycin Level, Trough (11.07.20 @ 07:08) : 19.5  Vancomycin Level, Trough (11.06.20 @ 06:20) : 19.9:   Procalcitonin, Serum (11.28.20 @ 15:38) : 1.55:       MEDICATIONS  (STANDING):    ascorbic acid 500 milliGRAM(s) Oral two times a day  chlorhexidine 2% Cloths 1 Application(s) Topical daily  enoxaparin Injectable 50 milliGRAM(s) SubCutaneous daily  fluconAZOLE   Tablet 200 milliGRAM(s) Oral daily  furosemide    Tablet 20 milliGRAM(s) Oral daily  lactobacillus acidophilus 1 Tablet(s) Oral daily  linezolid    Tablet 600 milliGRAM(s) Oral every 12 hours  multivitamin/minerals 1 Tablet(s) Oral daily  nystatin Powder 1 Application(s) Topical two times a day  pantoprazole   Suspension 40 milliGRAM(s) Enteral Tube daily  rifAXIMin 550 milliGRAM(s) Oral two times a day  zinc sulfate 220 milliGRAM(s) Oral daily        RADIOLOGY & ADDITIONAL TESTS:    12/9/20 : Xray Chest 1 View- PORTABLE-Routine (Xray Chest 1 View- PORTABLE-Routine in AM.) (12.09.20 @ 11:54) >  IMPRESSION: Persistent effusions left greater than right.      11/27/20 : Xray Chest 1 View- PORTABLE-Urgent (Xray Chest 1 View- PORTABLE-Urgent .) (11.27.20 @ 06:53) Increased interstitial lung markings with bilateral consolidations right greater than left. Small right pleural effusion. Overall worsening compared to prior exam.      11/24/20 : MR Pelvis Bony Only No Cont (11.24.20 @ 18:02) : Osteomyelitis of the lower sacrum and coccyx with overlying cutaneous ulceration,      11/19/20 : Xray Chest 1 View- PORTABLE-Urgent (Xray Chest 1 View- PORTABLE-Urgent .) (11.19.20 @ 23:53): Improvement in bilateral airspace disease with persistent bibasilar consolidation and small effusions.    11/4/20 : Xray Chest 1 View- PORTABLE-Routine (Xray Chest 1 View- PORTABLE-Routine in AM.) (11.04.20 @ 10:59) Reexpansion of the left lung with residual left pleural effusion and/or infiltrate.  Right pulmonary edema unchanged.  Tubes and catheters in satisfactory position.      10/25/20: Xray Chest 1 View- PORTABLE-Routine (Xray Chest 1 View- PORTABLE-Routine in AM.) (10.25.20 @ 09:21) Frontal expiratory view of the chest shows the heart to be similar in size. Endotracheal tube, right jugular line and feeding tube remain present. The lungs show similar left upper lobe infiltrate with progression of right perihilar infiltrate. Pleural effusions are similar. There is no evidence of pneumothorax.    10/21/20 : CT Abdomen and Pelvis w/ Oral Cont and w/ IV Cont (10.21.20 @ 15:59) Mural thickening of the left colon and rectum. Liquid stool in the colon. Apparent segmental mural thickening of the mid to distal small bowel and the proximal duodenum. Findings may represent nonspecific enterocolitis, noninfectious inflammatory bowel disease, or ischemic bowel. Clinical correlation is recommended. The celiac axis artery, SMA, and KINA are patent without stenosis.    Dilatation of the mid small bowel is again noted. Oral contrast has reached the terminal ileum. Findings may represent small bowel obstruction, or ileus related to nonspecific enterocolitis.   Cholelithiasis. Moderate to large ascites in the abdomen, increased since the previous examination. 5 mm nonspecific noncalcified left upper lobe lung nodule; if the patient's is in the high risk category (i.e. smoker), follow-up chest CT may be pursued in 12 months to ensure stability. Combination of atelectasis and consolidation in the left lower lobe.  Possible 1.0 cm hypodense lesion in the left lobe of the thyroid. Thyroid ultrasound may be pursued for further evaluation.   Mild bilateral pleural effusions.  Aging determinate compression fracture at T5 vertebra.        MICROBIOLOGY DATA:  MRSA/MSSA PCR (12.01.20 @ 01:16)   MRSA PCR Result.: Detected:    MRSA/MSSA PCR (11.28.20 @ 11:34)   MRSA PCR Result.: Detected:     Urine Microscopic-Add On (NC) (11.20.20 @ 04:12)   Red Blood Cell - Urine: 5-10 /HPF   White Blood Cell - Urine: 11-25 /HPF   Calcium Oxalate Crystals: Few /HPF   Bacteria: Moderate /HPF   Comment - Urine: few amorphous urates   Epithelial Cells: Few /HPF     Culture - Blood (11.19.20 @ 10:19)   Specimen Source: .Blood Blood-Peripheral   Culture Results: No growth to date.     Culture - Surgical Swab (11.12.20 @ 05:01)   - Ampicillin: R >8 Predicts results to ampicillin/sulbactam, amoxacillin-clavulanate and piperacillin-tazobactam.   - Levofloxacin: R >4   - Linezolid: S 1   - Tetra/Doxy: R >8   - Vancomycin: R >16   Specimen Source: .Surgical Swab Sacral decub   Culture Results:   Few Enterococcus faecium (vancomycin resistant)   Rare Candida albicans "Susceptibilities not performed"   Organism Identification: Enterococcus faecium (vancomycin resistant)   Organism: Enterococcus faecium (vancomycin resistant)   Method Type: STEWART     Culture - Surgical Swab (11.12.20 @ 05:01)   Specimen Source: .Surgical Swab Sacral decub   Culture Results:  Few Enterococcus faecium Susceptibility to follow.   Rare Candida albicans "Susceptibilities not performed"     Culture - Sputum . (10.26.20 @ 00:26)   - Ampicillin/Sulbactam: R <=8/4   - Cefazolin: R >16   - Ceftazidime: I 16   - Clindamycin: R >4   - Erythromycin: R >4   - Gentamicin: S <=1 Should not be used as monotherapy   - Levofloxacin: S <=0.5   - Linezolid: S 2   - Oxacillin: R >2   - Penicillin: R >8   - RIF- Rifampin: S <=1 Should not be used as monotherapy   - Tetra/Doxy: S <=1   - Trimethoprim/Sulfamethoxazole: S <=0.5/9.5   - Trimethoprim/Sulfamethoxazole: S <=0.5/9.5   - Vancomycin: S 1     MRSA/MSSA PCR (10.21.20 @ 09:41)   MRSA PCR Result.: Detected:       Culture - Blood (10.20.20 @ 22:09)   Specimen Source: .Blood Blood   Culture Results:  No growth to date.     Culture - Blood (10.20.20 @ 22:09)   Specimen Source: .Blood Blood   Culture Results:   No growth to date.     Culture - Blood in AM (10.16.20 @ 10:13)   Specimen Source: .Blood Blood-Peripheral   Culture Results: No growth to date.     Culture - Blood in AM (10.16.20 @ 10:13)   Specimen Source: .Blood Blood-Peripheral   Culture Results: No growth to date.     Culture - Blood (10.13.20 @ 22:23)   Growth in anaerobic bottle: Gram Negative Rods   Specimen Source: .Blood Blood-Peripheral   Organism: Blood Culture PCR   Culture Results: Growth in anaerobic bottle: Bacteroides fragilis   "Susceptibilities not performed"     Culture - Blood (10.13.20 @ 22:23)   Specimen Source: .Blood Blood-Peripheral   Culture Results:   No growth to date.                  Patient is seen and examined at the bed side,  is afebrile now, spiking fever.  The WBC count and creatinine stay normal.  The Procalcitonin level trended down to 0.98 from 1.55.      REVIEW OF SYSTEMS: All other review systems are negative      ALLERGIES: No Known Allergies      Vital Signs Last 24 Hrs  T(C): 36.9 (16 Dec 2020 13:08), Max: 38.7 (16 Dec 2020 05:28)  T(F): 98.5 (16 Dec 2020 13:08), Max: 101.6 (16 Dec 2020 05:28)  HR: 98 (16 Dec 2020 13:08) (98 - 106)  BP: 94/65 (16 Dec 2020 13:08) (94/65 - 106/62)  BP(mean): --  RR: 18 (16 Dec 2020 13:08) (18 - 19)  SpO2: 99% (16 Dec 2020 13:08) (93% - 100%)      PHYSICAL EXAM:  GENERAL: Not in distress, off oxygen   CHEST/LUNG: Not using accessory muscles   HEART: s1 and s2 present  ABDOMEN:  Nontender and  Nondistended  EXTREMITIES: B/L UE edematous improved  CNS: Awake and Alert         LABS:                        7.5    9.74  )-----------( 310      ( 16 Dec 2020 07:22 )             25.3                         8.0    10.27 )-----------( 314      ( 14 Dec 2020 08:36 )             26.8                9.4    13.14 )-----------( 149      ( 14 Nov 2020 07:58 )             28.7       12-16    144  |  106  |  15  ----------------------------<  85  3.8   |  33<H>  |  0.76    Ca    8.3<L>      16 Dec 2020 07:22  Phos  3.8     12-16  Mg     2.0     12-16    TPro  6.6  /  Alb  1.8<L>  /  TBili  1.4<H>  /  DBili  x   /  AST  37  /  ALT  26  /  AlkPhos  273<H>  12-16      11-06    138  |  104  |  37<H>  ----------------------------<  81  3.9   |  25  |  2.37<H>    Ca    8.1<L>      06 Nov 2020 06:20  Phos  3.4     11-06  Mg     2.0     11-06    TPro  5.1<L>  /  Alb  2.8<L>  /  TBili  9.2<H>  /  DBili  x   /  AST  233<H>  /  ALT  99<H>  /  AlkPhos  96  11-06      Procalcitonin, Serum (12.16.20 @ 19:11)   Procalcitonin, Serum: 0.98:     Vancomycin Level, Trough (11.07.20 @ 21:49)   Vancomycin Level, Trough: 16.1:     Vancomycin Level, Trough (11.07.20 @ 07:08) : 19.5  Vancomycin Level, Trough (11.06.20 @ 06:20) : 19.9:   Procalcitonin, Serum (11.28.20 @ 15:38) : 1.55:       MEDICATIONS  (STANDING):    ascorbic acid 500 milliGRAM(s) Oral two times a day  chlorhexidine 2% Cloths 1 Application(s) Topical daily  enoxaparin Injectable 50 milliGRAM(s) SubCutaneous daily  fluconAZOLE   Tablet 200 milliGRAM(s) Oral daily  furosemide    Tablet 20 milliGRAM(s) Oral daily  lactobacillus acidophilus 1 Tablet(s) Oral daily  linezolid    Tablet 600 milliGRAM(s) Oral every 12 hours  multivitamin/minerals 1 Tablet(s) Oral daily  nystatin Powder 1 Application(s) Topical two times a day  pantoprazole   Suspension 40 milliGRAM(s) Enteral Tube daily  rifAXIMin 550 milliGRAM(s) Oral two times a day  zinc sulfate 220 milliGRAM(s) Oral daily        RADIOLOGY & ADDITIONAL TESTS:    12/9/20 : Xray Chest 1 View- PORTABLE-Routine (Xray Chest 1 View- PORTABLE-Routine in AM.) (12.09.20 @ 11:54) >  IMPRESSION: Persistent effusions left greater than right.      11/27/20 : Xray Chest 1 View- PORTABLE-Urgent (Xray Chest 1 View- PORTABLE-Urgent .) (11.27.20 @ 06:53) Increased interstitial lung markings with bilateral consolidations right greater than left. Small right pleural effusion. Overall worsening compared to prior exam.      11/24/20 : MR Pelvis Bony Only No Cont (11.24.20 @ 18:02) : Osteomyelitis of the lower sacrum and coccyx with overlying cutaneous ulceration,      11/19/20 : Xray Chest 1 View- PORTABLE-Urgent (Xray Chest 1 View- PORTABLE-Urgent .) (11.19.20 @ 23:53): Improvement in bilateral airspace disease with persistent bibasilar consolidation and small effusions.    11/4/20 : Xray Chest 1 View- PORTABLE-Routine (Xray Chest 1 View- PORTABLE-Routine in AM.) (11.04.20 @ 10:59) Reexpansion of the left lung with residual left pleural effusion and/or infiltrate.  Right pulmonary edema unchanged.  Tubes and catheters in satisfactory position.      10/25/20: Xray Chest 1 View- PORTABLE-Routine (Xray Chest 1 View- PORTABLE-Routine in AM.) (10.25.20 @ 09:21) Frontal expiratory view of the chest shows the heart to be similar in size. Endotracheal tube, right jugular line and feeding tube remain present. The lungs show similar left upper lobe infiltrate with progression of right perihilar infiltrate. Pleural effusions are similar. There is no evidence of pneumothorax.    10/21/20 : CT Abdomen and Pelvis w/ Oral Cont and w/ IV Cont (10.21.20 @ 15:59) Mural thickening of the left colon and rectum. Liquid stool in the colon. Apparent segmental mural thickening of the mid to distal small bowel and the proximal duodenum. Findings may represent nonspecific enterocolitis, noninfectious inflammatory bowel disease, or ischemic bowel. Clinical correlation is recommended. The celiac axis artery, SMA, and KINA are patent without stenosis.    Dilatation of the mid small bowel is again noted. Oral contrast has reached the terminal ileum. Findings may represent small bowel obstruction, or ileus related to nonspecific enterocolitis.   Cholelithiasis. Moderate to large ascites in the abdomen, increased since the previous examination. 5 mm nonspecific noncalcified left upper lobe lung nodule; if the patient's is in the high risk category (i.e. smoker), follow-up chest CT may be pursued in 12 months to ensure stability. Combination of atelectasis and consolidation in the left lower lobe.  Possible 1.0 cm hypodense lesion in the left lobe of the thyroid. Thyroid ultrasound may be pursued for further evaluation.   Mild bilateral pleural effusions.  Aging determinate compression fracture at T5 vertebra.        MICROBIOLOGY DATA:  MRSA/MSSA PCR (12.01.20 @ 01:16)   MRSA PCR Result.: Detected:    MRSA/MSSA PCR (11.28.20 @ 11:34)   MRSA PCR Result.: Detected:     Urine Microscopic-Add On (NC) (11.20.20 @ 04:12)   Red Blood Cell - Urine: 5-10 /HPF   White Blood Cell - Urine: 11-25 /HPF   Calcium Oxalate Crystals: Few /HPF   Bacteria: Moderate /HPF   Comment - Urine: few amorphous urates   Epithelial Cells: Few /HPF     Culture - Blood (11.19.20 @ 10:19)   Specimen Source: .Blood Blood-Peripheral   Culture Results: No growth to date.     Culture - Surgical Swab (11.12.20 @ 05:01)   - Ampicillin: R >8 Predicts results to ampicillin/sulbactam, amoxacillin-clavulanate and piperacillin-tazobactam.   - Levofloxacin: R >4   - Linezolid: S 1   - Tetra/Doxy: R >8   - Vancomycin: R >16   Specimen Source: .Surgical Swab Sacral decub   Culture Results:   Few Enterococcus faecium (vancomycin resistant)   Rare Candida albicans "Susceptibilities not performed"   Organism Identification: Enterococcus faecium (vancomycin resistant)   Organism: Enterococcus faecium (vancomycin resistant)   Method Type: STEWART     Culture - Surgical Swab (11.12.20 @ 05:01)   Specimen Source: .Surgical Swab Sacral decub   Culture Results:  Few Enterococcus faecium Susceptibility to follow.   Rare Candida albicans "Susceptibilities not performed"     Culture - Sputum . (10.26.20 @ 00:26)   - Ampicillin/Sulbactam: R <=8/4   - Cefazolin: R >16   - Ceftazidime: I 16   - Clindamycin: R >4   - Erythromycin: R >4   - Gentamicin: S <=1 Should not be used as monotherapy   - Levofloxacin: S <=0.5   - Linezolid: S 2   - Oxacillin: R >2   - Penicillin: R >8   - RIF- Rifampin: S <=1 Should not be used as monotherapy   - Tetra/Doxy: S <=1   - Trimethoprim/Sulfamethoxazole: S <=0.5/9.5   - Trimethoprim/Sulfamethoxazole: S <=0.5/9.5   - Vancomycin: S 1     MRSA/MSSA PCR (10.21.20 @ 09:41)   MRSA PCR Result.: Detected:       Culture - Blood (10.20.20 @ 22:09)   Specimen Source: .Blood Blood   Culture Results:  No growth to date.     Culture - Blood (10.20.20 @ 22:09)   Specimen Source: .Blood Blood   Culture Results:   No growth to date.     Culture - Blood in AM (10.16.20 @ 10:13)   Specimen Source: .Blood Blood-Peripheral   Culture Results: No growth to date.     Culture - Blood in AM (10.16.20 @ 10:13)   Specimen Source: .Blood Blood-Peripheral   Culture Results: No growth to date.     Culture - Blood (10.13.20 @ 22:23)   Growth in anaerobic bottle: Gram Negative Rods   Specimen Source: .Blood Blood-Peripheral   Organism: Blood Culture PCR   Culture Results: Growth in anaerobic bottle: Bacteroides fragilis   "Susceptibilities not performed"     Culture - Blood (10.13.20 @ 22:23)   Specimen Source: .Blood Blood-Peripheral   Culture Results:   No growth to date.

## 2020-12-16 NOTE — PROGRESS NOTE ADULT - SUBJECTIVE AND OBJECTIVE BOX
Patient was seen and examined  Patient is a 65y old  Female who presents with a chief complaint of Hypotension (16 Dec 2020 12:02)      INTERVAL HPI/OVERNIGHT EVENTS:  T(C): 36.9 (12-16-20 @ 13:08), Max: 38.7 (12-16-20 @ 05:28)  HR: 98 (12-16-20 @ 13:08) (98 - 110)  BP: 94/65 (12-16-20 @ 13:08) (92/61 - 106/62)  RR: 18 (12-16-20 @ 13:08) (18 - 19)  SpO2: 99% (12-16-20 @ 13:08) (93% - 100%)  Wt(kg): --  I&O's Summary      LABS:                        7.5    9.74  )-----------( 310      ( 16 Dec 2020 07:22 )             25.3     12-16    144  |  106  |  15  ----------------------------<  85  3.8   |  33<H>  |  0.76    Ca    8.3<L>      16 Dec 2020 07:22  Phos  3.8     12-16  Mg     2.0     12-16    TPro  6.6  /  Alb  1.8<L>  /  TBili  1.4<H>  /  DBili  x   /  AST  37  /  ALT  26  /  AlkPhos  273<H>  12-16        CAPILLARY BLOOD GLUCOSE      POCT Blood Glucose.: 90 mg/dL (16 Dec 2020 05:50)  POCT Blood Glucose.: 98 mg/dL (15 Dec 2020 23:35)              MEDICATIONS  (STANDING):  ascorbic acid 500 milliGRAM(s) Oral two times a day  chlorhexidine 2% Cloths 1 Application(s) Topical daily  enoxaparin Injectable 50 milliGRAM(s) SubCutaneous daily  fluconAZOLE   Tablet 200 milliGRAM(s) Oral daily  furosemide    Tablet 20 milliGRAM(s) Oral daily  lactobacillus acidophilus 1 Tablet(s) Oral daily  linezolid    Tablet 600 milliGRAM(s) Oral every 12 hours  multivitamin/minerals 1 Tablet(s) Oral daily  nystatin Powder 1 Application(s) Topical two times a day  pantoprazole   Suspension 40 milliGRAM(s) Enteral Tube daily  rifAXIMin 550 milliGRAM(s) Oral two times a day  zinc sulfate 220 milliGRAM(s) Oral daily    MEDICATIONS  (PRN):  acetaminophen   Tablet .. 650 milliGRAM(s) Oral every 6 hours PRN Temp greater or equal to 38C (100.4F), Mild Pain (1 - 3), Moderate Pain (4 - 6)  ALBUTerol    90 MICROgram(s) HFA Inhaler 2 Puff(s) Inhalation every 6 hours PRN Shortness of Breath and/or Wheezing  sodium chloride 0.9% lock flush 10 milliLiter(s) IV Push every 1 hour PRN Pre/post blood products, medications, blood draw, and to maintain line patency      RADIOLOGY & ADDITIONAL TESTS:    Imaging Personally Reviewed:  [ ] YES  [ ] NO    REVIEW OF SYSTEMS:  CONSTITUTIONAL: No fever, weight loss, or fatigue  EYES: No eye pain, visual disturbances, or discharge  ENMT:  No difficulty hearing, tinnitus, vertigo; No sinus or throat pain  NECK: No pain or stiffness  BREASTS: No pain, masses, or nipple discharge  RESPIRATORY: No cough, wheezing, chills or hemoptysis; No shortness of breath  CARDIOVASCULAR: No chest pain, palpitations, dizziness, or leg swelling  GASTROINTESTINAL: No abdominal or epigastric pain. No nausea, vomiting, or hematemesis; No diarrhea or constipation. No melena or hematochezia.  GENITOURINARY: No dysuria, frequency, hematuria, or incontinence  NEUROLOGICAL: No headaches, memory loss, loss of strength, numbness, or tremors  SKIN: No itching, burning, rashes, or lesions   LYMPH NODES: No enlarged glands  ENDOCRINE: No heat or cold intolerance; No hair loss  MUSCULOSKELETAL: No joint pain or swelling; No muscle, back, or extremity pain  PSYCHIATRIC: No depression, anxiety, mood swings, or difficulty sleeping  HEME/LYMPH: No easy bruising, or bleeding gums  ALLERY AND IMMUNOLOGIC: No hives or eczema      Consultant(s) Notes Reviewed:  [ x ] YES  [ ] NO    PHYSICAL EXAM:  GENERAL: NAD, well-groomed, well-developed  HEAD:  Atraumatic, Normocephalic  EYES: EOMI, PERRLA, conjunctiva and sclera clear  ENMT: No tonsillar erythema, exudates, or enlargement; Moist mucous membranes, Good dentition, No lesions  NECK: Supple, No JVD, Normal thyroid  NERVOUS SYSTEM:  Alert & Oriented X3, Good concentration; Motor Strength 5/5 B/L upper and lower extremities; DTRs 2+ intact and symmetric  CHEST/LUNG: Clear to percussion bilaterally; No rales, rhonchi, wheezing, or rubs  HEART: Regular rate and rhythm; No murmurs, rubs, or gallops  ABDOMEN: Soft, Nontender, Nondistended; Bowel sounds present  EXTREMITIES:  2+ Peripheral Pulses, No clubbing, cyanosis, or edema  LYMPH: No lymphadenopathy noted  SKIN: No rashes or lesions    Care Discussed with Consultants/Other Providers [ x] YES  [ ] NO

## 2020-12-16 NOTE — PROGRESS NOTE ADULT - PROBLEM SELECTOR PLAN 4
Continue wound care.  turn every 2 hours  Continue antibiotics as per Dr Patricio. Needs antibiotics thru 12/29.   OOB daily.

## 2020-12-16 NOTE — PROGRESS NOTE ADULT - PROBLEM SELECTOR PLAN 2
Multi-organ failure with heart failure.  Plan: Echo shows improved EF   s/p IV Lasix 11/27 for pleural effusions;   Currently appears euvolemic  Lasix po 20mg with hold parameters   Continue supportive care.

## 2020-12-16 NOTE — PROGRESS NOTE ADULT - SUBJECTIVE AND OBJECTIVE BOX
MARIBETH PADILLA    SCU progress note    INTERVAL HPI/OVERNIGHT EVENTS: ***Febrile Tmax 101.6    DNR [x ]   DNI  [  ]   TRIAL OF INTUBATION    Covid - 19 PCR: Negative 14X    The 4Ms    What Matters Most: see [facility]  Age appropriate Medications/Screen for High Risk Medication: Yes  Mentation: see CAM below  Mobility: defer to physical exam    The Confusion Assessment Method (CAM) Diagnostic Algorithm     1: Acute Onset or Fluctuating Course  - Is there evidence of an acute change in mental status from the patient’s baseline? Did the (abnormal) behavior  fluctuate during the day, that is, tend to come and go, or increase and decrease in severity?  [ ] YES [x ] NO     2: Inattention  - Did the patient have difficulty focusing attention, being easily distractible, or having difficulty keeping track of what was being said?  [ ] YES [x ] NO     3: Disorganized thinking  -Was the patient’s thinking disorganized or incoherent, such as rambling or irrelevant conversation, unclear or illogical flow of ideas, or unpredictable switching from subject to subject?  [ ] YES [x ] NO    4: Altered Level of consciousness?  [ ] YES [x ] NO    The diagnosis of delirium by CAM requires the presence of features 1 and 2 and either 3 or 4.    PRESSORS: [ ] YES [x ] NO  fluconAZOLE   Tablet 200 milliGRAM(s) Oral daily  linezolid    Tablet 600 milliGRAM(s) Oral every 12 hours  rifAXIMin 550 milliGRAM(s) Oral two times a day    Cardiovascular:  Heart Failure  Acute   Acute on Chronic  Chronic       furosemide    Tablet 20 milliGRAM(s) Oral daily    Pulmonary:  ALBUTerol    90 MICROgram(s) HFA Inhaler 2 Puff(s) Inhalation every 6 hours PRN    Hematalogic:  enoxaparin Injectable 50 milliGRAM(s) SubCutaneous daily    Other:  acetaminophen   Tablet .. 650 milliGRAM(s) Oral every 6 hours PRN  ascorbic acid 500 milliGRAM(s) Oral two times a day  chlorhexidine 2% Cloths 1 Application(s) Topical daily  lactobacillus acidophilus 1 Tablet(s) Oral daily  multivitamin/minerals 1 Tablet(s) Oral daily  nystatin Powder 1 Application(s) Topical two times a day  pantoprazole   Suspension 40 milliGRAM(s) Enteral Tube daily  sodium chloride 0.9% lock flush 10 milliLiter(s) IV Push every 1 hour PRN  zinc sulfate 220 milliGRAM(s) Oral daily    acetaminophen   Tablet .. 650 milliGRAM(s) Oral every 6 hours PRN  ALBUTerol    90 MICROgram(s) HFA Inhaler 2 Puff(s) Inhalation every 6 hours PRN  ascorbic acid 500 milliGRAM(s) Oral two times a day  chlorhexidine 2% Cloths 1 Application(s) Topical daily  enoxaparin Injectable 50 milliGRAM(s) SubCutaneous daily  fluconAZOLE   Tablet 200 milliGRAM(s) Oral daily  furosemide    Tablet 20 milliGRAM(s) Oral daily  lactobacillus acidophilus 1 Tablet(s) Oral daily  linezolid    Tablet 600 milliGRAM(s) Oral every 12 hours  multivitamin/minerals 1 Tablet(s) Oral daily  nystatin Powder 1 Application(s) Topical two times a day  pantoprazole   Suspension 40 milliGRAM(s) Enteral Tube daily  rifAXIMin 550 milliGRAM(s) Oral two times a day  sodium chloride 0.9% lock flush 10 milliLiter(s) IV Push every 1 hour PRN  zinc sulfate 220 milliGRAM(s) Oral daily    Drug Dosing Weight  Height (cm): 167.6 (21 Oct 2020 02:26)  Weight (kg): 56.2 (21 Oct 2020 02:26)  BMI (kg/m2): 20 (21 Oct 2020 02:26)  BSA (m2): 1.63 (21 Oct 2020 02:26)    CENTRAL LINE: [ ] YES [ ] NO  LOCATION:   DATE INSERTED:  REMOVE: [ ] YES [ ] NO  EXPLAIN:    RTEJO: [ ] YES [ ] NO    DATE INSERTED:  REMOVE:  [ ] YES [ ] NO  EXPLAIN:    PAST MEDICAL & SURGICAL HISTORY:  Anasarca    Pulmonary embolism    DVT (deep venous thrombosis)    SBO (small bowel obstruction)    H/O exploratory laparotomy        PHYSICAL EXAM:    GENERAL: NAD, well-groomed, well-developed  HEAD:  Atraumatic, Normocephalic  EYES: EOMI, PERRLA, conjunctiva and sclera clear  ENMT: No tonsillar erythema, exudates  NECK: Supple, No JVD  NERVOUS SYSTEM:  Alert & Oriented X2. Follows commands, moving all extremities.  CHEST/LUNG: Diminished breath sounds bilateral bases  HEART: Regular rate and rhythm; No murmurs, rubs, or gallops  ABDOMEN: Soft, Nontender, Nondistended; Bowel sounds present  EXTREMITIES:  2+ Peripheral Pulses, No clubbing, cyanosis  LYMPH: No lymphadenopathy noted  SKIN: Unstageable sacrum      LABS:  CBC Full  -  ( 16 Dec 2020 07:22 )  WBC Count : 9.74 K/uL  RBC Count : 2.66 M/uL  Hemoglobin : 7.5 g/dL  Hematocrit : 25.3 %  Platelet Count - Automated : 310 K/uL  Mean Cell Volume : 95.1 fl  Mean Cell Hemoglobin : 28.2 pg  Mean Cell Hemoglobin Concentration : 29.6 gm/dL  Auto Neutrophil # : 6.98 K/uL  Auto Lymphocyte # : 1.55 K/uL  Auto Monocyte # : 0.96 K/uL  Auto Eosinophil # : 0.15 K/uL  Auto Basophil # : 0.06 K/uL  Auto Neutrophil % : 71.7 %  Auto Lymphocyte % : 15.9 %  Auto Monocyte % : 9.9 %  Auto Eosinophil % : 1.5 %  Auto Basophil % : 0.6 %        144  |  106  |  15  ----------------------------<  85  3.8   |  33<H>  |  0.76    Ca    8.3<L>      16 Dec 2020 07:22  Phos  3.8     12-  Mg     2.0     12-    TPro  6.6  /  Alb  1.8<L>  /  TBili  1.4<H>  /  DBili  x   /  AST  37  /  ALT  26  /  AlkPhos  273<H>  12-16      Urinalysis Basic - ( 14 Dec 2020 12:17 )    Color: Yellow / Appearance: Clear / S.010 / pH: x  Gluc: x / Ketone: Negative  / Bili: Negative / Urobili: Negative   Blood: x / Protein: 30 mg/dL / Nitrite: Negative   Leuk Esterase: Negative / RBC: 2-5 /HPF / WBC 3-5 /HPF   Sq Epi: x / Non Sq Epi: Occasional /HPF / Bacteria: Moderate /HPF            [  ]  DVT Prophylaxis  [  ]  Nutrition, Brand, Rate         Goal Rate        Abnormal Nutritional Status -  Malnutrition   Cachexia          RADIOLOGY & ADDITIONAL STUDIES:  ***  < from: Xray Chest 1 View- PORTABLE-Routine (Xray Chest 1 View- PORTABLE-Routine .) (12.15.20 @ 11:51) >  FINDINGS: Heart size appears within normal limits. No superior mediastinal widening is identified. Bilateral lung parenchymal airspace opacities and bilateral pleural effusions identified, without change. No evidence of pneumothorax. No mediastinal shift is noted. Previously noted left-sided PICC line has been removed.    IMPRESSION: Bilateral lung parenchymal airspace opacities and bilateral pleural effusions, without significant change.    < end of copied text >    Goals of Care Discussion with Family/Proxy/Other   - see note from

## 2020-12-16 NOTE — PROGRESS NOTE ADULT - PROBLEM SELECTOR PLAN 1
Bacteroides fragilis per BC on 10/13  Sputum positive for MRSA and Stenotrophomonas  Aspiration pneumonia vs HCAP .   Repeat BC 10/16 and 11/19 NGTD ,UC on 11/20 NGTD   Infected sacral ulcer- s/p debridement and culture grew Enterococcus (VRE) and Candida  Leukocytosis resolved, Cdiff negative  MRI sacrum/pelvis shows Osteomyelitis  c/w abx per ID Dapto until 12/28. can switch to linezolid 600mg  q12h on 12/14 for 14 days. Fluconazole 200mg daily until 12/29.

## 2020-12-16 NOTE — PROGRESS NOTE ADULT - ASSESSMENT
Patient is a 65y old  Female from home, lives with daughter, ambulates with walker with PMH of recurrent SBO's, s/p exp lap, SB resection in 2015, ex lap, ALBINA in 2018, DVT, PE, on Xarelto, IVC filter, chronic leg swelling, and anasarca, Now send in to the ER by her PCP, Dr. Ross, for evaluation of  generalized weakness and hypotension BP of 80/40 during office visit. On admission, she found to have no fever and BP was in lower normal range and no Leukocytosis. The CXR is clear and CT abd/pelvis consistent with Ileus. The ID consult requested to assist with evaluation of infectious etiology of episode of hypotension. Found to have Bacteroides bacteremia and now developed septic shock on Cefepime and Flagyl. Hence transferred to ICU. 10/20/20.    # Hypotension  at office- BP of 80/40 - resolved   #  Bacteroides fragilis Bacteremia - 10/13/20 - ? source most likely GI- Repeat Blood Cxs have NGTD 10/16/20  # Ileus  - h/o recurrent SBO and s/p EXp. LAp  # COVID 19 negative   # Septic shock ( Hypothermia + hypotensive)- transferred to ICU since requiring pressor- source GI, The CT abd/pelvis shows nonspecific enterocolitis, noninfectious inflammatory bowel disease, or ischemic bowel, along with ileus.   # Pneumonia- HCAP vs Aspiration - on CXR 10/24 and CT chest 10/21- sputum Cx grew Methicillin resistant Staphylococcus aureus  and Stenotrophomonas maltophilia.  # S/p extubated 11/9/20  # Infected sacral ulcer- s/p debridement and culture grew Enterococcus, VRE and Candida, sensitivity is pending- The MRI of pelvis shows  Osteomyelitis of the lower sacrum and coccyx with overlying cutaneous ulceration.   # Fever- 11/8 and 11/19- BCX NGTD 11/19 - The CXR shows Improvement in bilateral airspace disease with persistent bibasilar consolidation. She is s/p removal of velazquez catheter and passed TOV.  # HCAP- 11/27/20,  Intermittent fever and CXR shows  bilateral consolidations right greater than left. - The procalcitonin level is 1.55  # s/p PICC line placement 11/30  # Persistent effusions left greater than right      would recommend:    1. Monitor Temp. and c/w supportive care   2. Continue oral Linezolid  and oral fluconazole until  12/29/20  3. Aspiration precaution  4. Frequent repositioning     Attending Attestation:    Spent more than 35 minutes on total encounter, more than 50 % of the visit was spent counseling and/or coordinating care by the Attending physician.       Patient is a 65y old  Female from home, lives with daughter, ambulates with walker with PMH of recurrent SBO's, s/p exp lap, SB resection in 2015, ex lap, ALBINA in 2018, DVT, PE, on Xarelto, IVC filter, chronic leg swelling, and anasarca, Now send in to the ER by her PCP, Dr. Ross, for evaluation of  generalized weakness and hypotension BP of 80/40 during office visit. On admission, she found to have no fever and BP was in lower normal range and no Leukocytosis. The CXR is clear and CT abd/pelvis consistent with Ileus. The ID consult requested to assist with evaluation of infectious etiology of episode of hypotension. Found to have Bacteroides bacteremia and now developed septic shock on Cefepime and Flagyl. Hence transferred to ICU. 10/20/20.    # Hypotension  at office- BP of 80/40 - resolved   #  Bacteroides fragilis Bacteremia - 10/13/20 - ? source most likely GI- Repeat Blood Cxs have NGTD 10/16/20  # Ileus  - h/o recurrent SBO and s/p EXp. LAp  # COVID 19 negative   # Septic shock ( Hypothermia + hypotensive)- transferred to ICU since requiring pressor- source GI, The CT abd/pelvis shows nonspecific enterocolitis, noninfectious inflammatory bowel disease, or ischemic bowel, along with ileus.   # Pneumonia- HCAP vs Aspiration - on CXR 10/24 and CT chest 10/21- sputum Cx grew Methicillin resistant Staphylococcus aureus  and Stenotrophomonas maltophilia.  # S/p extubated 11/9/20  # Infected sacral ulcer- s/p debridement and culture grew Enterococcus, VRE and Candida, sensitivity is pending- The MRI of pelvis shows  Osteomyelitis of the lower sacrum and coccyx with overlying cutaneous ulceration.   # Fever- 11/8 and 11/19- BCX NGTD 11/19 - The CXR shows Improvement in bilateral airspace disease with persistent bibasilar consolidation. She is s/p removal of velazquez catheter and passed TOV.  # HCAP- 11/27/20,  Intermittent fever and CXR shows  bilateral consolidations right greater than left. - The procalcitonin level is 1.55  # s/p PICC line placement 11/30  # Persistent effusions left greater than right      would recommend:    1. Monitor Temp. and c/w supportive care   2. Continue oral Linezolid  and oral fluconazole until  12/29/20  3. Aspiration precaution  4. Frequent repositioning   5. OOB to chair      Attending Attestation:    Spent more than 35 minutes on total encounter, more than 50 % of the visit was spent counseling and/or coordinating care by the Attending physician.

## 2020-12-16 NOTE — PROGRESS NOTE ADULT - PROBLEM SELECTOR PLAN 8
DVT and GI prophylaxis.  OOB daily   Awaiting accepting facility. CM following. Needs to be afebrile.  Overall prognosis is poor.

## 2020-12-17 NOTE — PROGRESS NOTE ADULT - SUBJECTIVE AND OBJECTIVE BOX
Patient was seen and examined  Patient is a 65y old  Female who presents with a chief complaint of Hypotension (17 Dec 2020 08:10)      INTERVAL HPI/OVERNIGHT EVENTS:  T(C): 36.7 (12-17-20 @ 05:00), Max: 38.1 (12-17-20 @ 00:09)  HR: 94 (12-17-20 @ 05:00) (94 - 113)  BP: 91/56 (12-17-20 @ 05:00) (91/56 - 113/68)  RR: 17 (12-17-20 @ 05:00) (17 - 19)  SpO2: 97% (12-17-20 @ 05:00) (97% - 99%)  Wt(kg): --  I&O's Summary      LABS:                        7.4    9.24  )-----------( 300      ( 17 Dec 2020 07:03 )             24.6     12-17    144  |  105  |  15  ----------------------------<  88  3.7   |  33<H>  |  0.74    Ca    8.4      17 Dec 2020 07:03  Phos  3.3     12-17  Mg     2.0     12-17    TPro  6.8  /  Alb  1.8<L>  /  TBili  1.2  /  DBili  x   /  AST  39  /  ALT  26  /  AlkPhos  276<H>  12-17        CAPILLARY BLOOD GLUCOSE                  MEDICATIONS  (STANDING):  ascorbic acid 500 milliGRAM(s) Oral two times a day  chlorhexidine 2% Cloths 1 Application(s) Topical daily  enoxaparin Injectable 50 milliGRAM(s) SubCutaneous daily  fluconAZOLE   Tablet 200 milliGRAM(s) Oral daily  furosemide    Tablet 20 milliGRAM(s) Oral daily  lactobacillus acidophilus 1 Tablet(s) Oral daily  linezolid    Tablet 600 milliGRAM(s) Oral every 12 hours  multivitamin/minerals 1 Tablet(s) Oral daily  nystatin Powder 1 Application(s) Topical two times a day  pantoprazole    Tablet 40 milliGRAM(s) Oral before breakfast  rifAXIMin 550 milliGRAM(s) Oral two times a day  zinc sulfate 220 milliGRAM(s) Oral daily    MEDICATIONS  (PRN):  acetaminophen   Tablet .. 650 milliGRAM(s) Oral every 6 hours PRN Temp greater or equal to 38C (100.4F), Mild Pain (1 - 3), Moderate Pain (4 - 6)  ALBUTerol    90 MICROgram(s) HFA Inhaler 2 Puff(s) Inhalation every 6 hours PRN Shortness of Breath and/or Wheezing  sodium chloride 0.9% lock flush 10 milliLiter(s) IV Push every 1 hour PRN Pre/post blood products, medications, blood draw, and to maintain line patency      RADIOLOGY & ADDITIONAL TESTS:    Imaging Personally Reviewed:  [ ] YES  [ ] NO    REVIEW OF SYSTEMS:  CONSTITUTIONAL: No fever, weight loss, or fatigue  EYES: No eye pain, visual disturbances, or discharge  ENMT:  No difficulty hearing, tinnitus, vertigo; No sinus or throat pain  NECK: No pain or stiffness  BREASTS: No pain, masses, or nipple discharge  RESPIRATORY: No cough, wheezing, chills or hemoptysis; No shortness of breath  CARDIOVASCULAR: No chest pain, palpitations, dizziness, or leg swelling  GASTROINTESTINAL: No abdominal or epigastric pain. No nausea, vomiting, or hematemesis; No diarrhea or constipation. No melena or hematochezia.  GENITOURINARY: No dysuria, frequency, hematuria, or incontinence  NEUROLOGICAL: No headaches, memory loss, loss of strength, numbness, or tremors  SKIN: No itching, burning, rashes, or lesions   LYMPH NODES: No enlarged glands  ENDOCRINE: No heat or cold intolerance; No hair loss  MUSCULOSKELETAL: No joint pain or swelling; No muscle, back, or extremity pain  PSYCHIATRIC: No depression, anxiety, mood swings, or difficulty sleeping  HEME/LYMPH: No easy bruising, or bleeding gums  ALLERY AND IMMUNOLOGIC: No hives or eczema      Consultant(s) Notes Reviewed:  [ x ] YES  [ ] NO    PHYSICAL EXAM:  GENERAL: NAD, well-groomed, well-developed  HEAD:  Atraumatic, Normocephalic  EYES: EOMI, PERRLA, conjunctiva and sclera clear  ENMT: No tonsillar erythema, exudates, or enlargement; Moist mucous membranes, Good dentition, No lesions  NECK: Supple, No JVD, Normal thyroid  NERVOUS SYSTEM:  Alert & Oriented X3, Good concentration; Motor Strength 5/5 B/L upper and lower extremities; DTRs 2+ intact and symmetric  CHEST/LUNG: Clear to percussion bilaterally; No rales, rhonchi, wheezing, or rubs  HEART: Regular rate and rhythm; No murmurs, rubs, or gallops  ABDOMEN: Soft, Nontender, Nondistended; Bowel sounds present  EXTREMITIES:  2+ Peripheral Pulses, No clubbing, cyanosis, or edema  LYMPH: No lymphadenopathy noted  SKIN: No rashes or lesions    Care Discussed with Consultants/Other Providers [ x] YES  [ ] NO

## 2020-12-17 NOTE — PROGRESS NOTE ADULT - SUBJECTIVE AND OBJECTIVE BOX
MARIBETH PADILLA    SCU progress note    INTERVAL HPI/OVERNIGHT EVENTS: ***    DNR [ ]   DNI  [  ]    Covid - 19 PCR:     The 4Ms    What Matters Most: see GOC  Age appropriate Medications/Screen for High Risk Medication: Yes  Mentation: see CAM below  Mobility: defer to physical exam    The Confusion Assessment Method (CAM) Diagnostic Algorithm     1: Acute Onset or Fluctuating Course  - Is there evidence of an acute change in mental status from the patient’s baseline? Did the (abnormal) behavior  fluctuate during the day, that is, tend to come and go, or increase and decrease in severity?  [ ] YES [ ] NO     2: Inattention  - Did the patient have difficulty focusing attention, being easily distractible, or having difficulty keeping track of what was being said?  [ ] YES [ ] NO     3: Disorganized thinking  -Was the patient’s thinking disorganized or incoherent, such as rambling or irrelevant conversation, unclear or illogical flow of ideas, or unpredictable switching from subject to subject?  [ ] YES [ ] NO    4: Altered Level of consciousness?  [ ] YES [ ] NO    The diagnosis of delirium by CAM requires the presence of features 1 and 2 and either 3 or 4.    PRESSORS: [ ] YES [ ] NO  fluconAZOLE   Tablet 200 milliGRAM(s) Oral daily  linezolid    Tablet 600 milliGRAM(s) Oral every 12 hours  rifAXIMin 550 milliGRAM(s) Oral two times a day    Cardiovascular:  Heart Failure  Acute   Acute on Chronic  Chronic       furosemide    Tablet 20 milliGRAM(s) Oral daily    Pulmonary:  ALBUTerol    90 MICROgram(s) HFA Inhaler 2 Puff(s) Inhalation every 6 hours PRN    Hematalogic:  enoxaparin Injectable 50 milliGRAM(s) SubCutaneous daily    Other:  acetaminophen   Tablet .. 650 milliGRAM(s) Oral every 6 hours PRN  ascorbic acid 500 milliGRAM(s) Oral two times a day  chlorhexidine 2% Cloths 1 Application(s) Topical daily  lactobacillus acidophilus 1 Tablet(s) Oral daily  multivitamin/minerals 1 Tablet(s) Oral daily  nystatin Powder 1 Application(s) Topical two times a day  pantoprazole    Tablet 40 milliGRAM(s) Oral before breakfast  sodium chloride 0.9% lock flush 10 milliLiter(s) IV Push every 1 hour PRN  zinc sulfate 220 milliGRAM(s) Oral daily    acetaminophen   Tablet .. 650 milliGRAM(s) Oral every 6 hours PRN  ALBUTerol    90 MICROgram(s) HFA Inhaler 2 Puff(s) Inhalation every 6 hours PRN  ascorbic acid 500 milliGRAM(s) Oral two times a day  chlorhexidine 2% Cloths 1 Application(s) Topical daily  enoxaparin Injectable 50 milliGRAM(s) SubCutaneous daily  fluconAZOLE   Tablet 200 milliGRAM(s) Oral daily  furosemide    Tablet 20 milliGRAM(s) Oral daily  lactobacillus acidophilus 1 Tablet(s) Oral daily  linezolid    Tablet 600 milliGRAM(s) Oral every 12 hours  multivitamin/minerals 1 Tablet(s) Oral daily  nystatin Powder 1 Application(s) Topical two times a day  pantoprazole    Tablet 40 milliGRAM(s) Oral before breakfast  rifAXIMin 550 milliGRAM(s) Oral two times a day  sodium chloride 0.9% lock flush 10 milliLiter(s) IV Push every 1 hour PRN  zinc sulfate 220 milliGRAM(s) Oral daily    Drug Dosing Weight  Height (cm): 167.6 (21 Oct 2020 02:26)  Weight (kg): 56.2 (21 Oct 2020 02:26)  BMI (kg/m2): 20 (21 Oct 2020 02:26)  BSA (m2): 1.63 (21 Oct 2020 02:26)    CENTRAL LINE: [ ] YES [ ] NO  LOCATION:   DATE INSERTED:  REMOVE: [ ] YES [ ] NO  EXPLAIN:    TREJO: [ ] YES [ ] NO    DATE INSERTED:  REMOVE:  [ ] YES [ ] NO  EXPLAIN:    PAST MEDICAL & SURGICAL HISTORY:  Anasarca    Pulmonary embolism    DVT (deep venous thrombosis)    SBO (small bowel obstruction)    H/O exploratory laparotomy                      PHYSICAL EXAM:    GENERAL: NAD, well-groomed, well-developed  HEAD:  Atraumatic, Normocephalic  EYES: EOMI, PERRLA, conjunctiva and sclera clear  ENMT: No tonsillar erythema, exudates, or enlargement; Moist mucous membranes, Good dentition, No lesions  NECK: Supple, No JVD, Normal thyroid  NERVOUS SYSTEM:  Alert & Oriented X3, Good concentration; Motor Strength 5/5 B/L upper and lower extremities; DTRs 2+ intact and symmetric  CHEST/LUNG: Clear to percussion bilaterally; No rales, rhonchi, wheezing, or rubs  HEART: Regular rate and rhythm; No murmurs, rubs, or gallops  ABDOMEN: Soft, Nontender, Nondistended; Bowel sounds present  EXTREMITIES:  2+ Peripheral Pulses, No clubbing, cyanosis, or edema  LYMPH: No lymphadenopathy noted  SKIN: No rashes or lesions      LABS:  CBC Full  -  ( 17 Dec 2020 07:03 )  WBC Count : 9.24 K/uL  RBC Count : 2.57 M/uL  Hemoglobin : 7.4 g/dL  Hematocrit : 24.6 %  Platelet Count - Automated : 300 K/uL  Mean Cell Volume : 95.7 fl  Mean Cell Hemoglobin : 28.8 pg  Mean Cell Hemoglobin Concentration : 30.1 gm/dL  Auto Neutrophil # : 6.57 K/uL  Auto Lymphocyte # : 1.42 K/uL  Auto Monocyte # : 0.87 K/uL  Auto Eosinophil # : 0.31 K/uL  Auto Basophil # : 0.04 K/uL  Auto Neutrophil % : 71.1 %  Auto Lymphocyte % : 15.4 %  Auto Monocyte % : 9.4 %  Auto Eosinophil % : 3.4 %  Auto Basophil % : 0.4 %    12-17    144  |  105  |  15  ----------------------------<  88  3.7   |  33<H>  |  0.74    Ca    8.4      17 Dec 2020 07:03  Phos  3.3     12-17  Mg     2.0     12-17    TPro  6.8  /  Alb  1.8<L>  /  TBili  1.2  /  DBili  x   /  AST  39  /  ALT  26  /  AlkPhos  276<H>  12-17              [  ]  DVT Prophylaxis  [  ]  Nutrition, Brand, Rate         Goal Rate        Abnormal Nutritional Status -  Malnutrition   Cachexia      Morbid Obesity BMI >/=40    RADIOLOGY & ADDITIONAL STUDIES:  ***    Goals of Care Discussion with Family/Proxy/Other   - see note from/family meeting set up for...     MARIBETH PADILLA    SCU progress note    INTERVAL HPI/OVERNIGHT EVENTS: temp 100.6 overnight    DNR [x ]   DNI  [  ] trial of intubation    Covid - 19 PCR:  neg 12/14    The 4Ms    What Matters Most: see [facility]  Age appropriate Medications/Screen for High Risk Medication: Yes  Mentation: see CAM below  Mobility: defer to physical exam    The Confusion Assessment Method (CAM) Diagnostic Algorithm     1: Acute Onset or Fluctuating Course  - Is there evidence of an acute change in mental status from the patient’s baseline? Did the (abnormal) behavior  fluctuate during the day, that is, tend to come and go, or increase and decrease in severity?  [ ] YES [x ] NO     2: Inattention  - Did the patient have difficulty focusing attention, being easily distractible, or having difficulty keeping track of what was being said?  [ ] YES [x ] NO     3: Disorganized thinking  -Was the patient’s thinking disorganized or incoherent, such as rambling or irrelevant conversation, unclear or illogical flow of ideas, or unpredictable switching from subject to subject?  [ ] YES [ x] NO    4: Altered Level of consciousness?  [ ] YES [x ] NO    The diagnosis of delirium by CAM requires the presence of features 1 and 2 and either 3 or 4.    PRESSORS: [ ] YES [x ] NO  fluconAZOLE   Tablet 200 milliGRAM(s) Oral daily  linezolid    Tablet 600 milliGRAM(s) Oral every 12 hours  rifAXIMin 550 milliGRAM(s) Oral two times a day    Cardiovascular:  Heart Failure  Acute   Acute on Chronic  Chronic       furosemide    Tablet 20 milliGRAM(s) Oral daily    Pulmonary:  ALBUTerol    90 MICROgram(s) HFA Inhaler 2 Puff(s) Inhalation every 6 hours PRN    Hematalogic:  enoxaparin Injectable 50 milliGRAM(s) SubCutaneous daily    Other:  acetaminophen   Tablet .. 650 milliGRAM(s) Oral every 6 hours PRN  ascorbic acid 500 milliGRAM(s) Oral two times a day  chlorhexidine 2% Cloths 1 Application(s) Topical daily  lactobacillus acidophilus 1 Tablet(s) Oral daily  multivitamin/minerals 1 Tablet(s) Oral daily  nystatin Powder 1 Application(s) Topical two times a day  pantoprazole    Tablet 40 milliGRAM(s) Oral before breakfast  sodium chloride 0.9% lock flush 10 milliLiter(s) IV Push every 1 hour PRN  zinc sulfate 220 milliGRAM(s) Oral daily    acetaminophen   Tablet .. 650 milliGRAM(s) Oral every 6 hours PRN  ALBUTerol    90 MICROgram(s) HFA Inhaler 2 Puff(s) Inhalation every 6 hours PRN  ascorbic acid 500 milliGRAM(s) Oral two times a day  chlorhexidine 2% Cloths 1 Application(s) Topical daily  enoxaparin Injectable 50 milliGRAM(s) SubCutaneous daily  fluconAZOLE   Tablet 200 milliGRAM(s) Oral daily  furosemide    Tablet 20 milliGRAM(s) Oral daily  lactobacillus acidophilus 1 Tablet(s) Oral daily  linezolid    Tablet 600 milliGRAM(s) Oral every 12 hours  multivitamin/minerals 1 Tablet(s) Oral daily  nystatin Powder 1 Application(s) Topical two times a day  pantoprazole    Tablet 40 milliGRAM(s) Oral before breakfast  rifAXIMin 550 milliGRAM(s) Oral two times a day  sodium chloride 0.9% lock flush 10 milliLiter(s) IV Push every 1 hour PRN  zinc sulfate 220 milliGRAM(s) Oral daily    Drug Dosing Weight  Height (cm): 167.6 (21 Oct 2020 02:26)  Weight (kg): 56.2 (21 Oct 2020 02:26)  BMI (kg/m2): 20 (21 Oct 2020 02:26)  BSA (m2): 1.63 (21 Oct 2020 02:26)    CENTRAL LINE: [ ] YES NOx  REMOVE: [ ] YES [ ] NO  EXPLAIN:     TREJO: [ ] YES [x ] NO    DATE INSERTED:  REMOVE:  [ ] YES [ ] NO  EXPLAIN:    PAST MEDICAL & SURGICAL HISTORY:  Anasarca    Pulmonary embolism    DVT (deep venous thrombosis)    SBO (small bowel obstruction)    H/O exploratory laparotomy  PHYSICAL EXAM:    GENERAL: NAD,elderly female  HEAD:  Atraumatic, Normocephalic  EYES: EOMI, PERRLA, conjunctiva and sclera clear  ENMT: No tonsillar erythema, exudates, drooling at times  NECK: Supple, No JVD  NERVOUS SYSTEM:  Alert & Oriented X2, Follow simple commands. Moving all extremites  CHEST/LUNG: Diminished breath sounds bilaterally  HEART: Regular rate and rhythm; No murmurs, rubs, or gallops  ABDOMEN: Soft, Nontender, Nondistended; Bowel sounds present  EXTREMITIES:  +1 edema bilateral hands. 2+ Peripheral Pulses, No clubbing, cyanosis  LYMPH: No lymphadenopathy noted  SKIN: Unstageable sacrum.      LABS:  CBC Full  -  ( 17 Dec 2020 07:03 )  WBC Count : 9.24 K/uL  RBC Count : 2.57 M/uL  Hemoglobin : 7.4 g/dL  Hematocrit : 24.6 %  Platelet Count - Automated : 300 K/uL  Mean Cell Volume : 95.7 fl  Mean Cell Hemoglobin : 28.8 pg  Mean Cell Hemoglobin Concentration : 30.1 gm/dL  Auto Neutrophil # : 6.57 K/uL  Auto Lymphocyte # : 1.42 K/uL  Auto Monocyte # : 0.87 K/uL  Auto Eosinophil # : 0.31 K/uL  Auto Basophil # : 0.04 K/uL  Auto Neutrophil % : 71.1 %  Auto Lymphocyte % : 15.4 %  Auto Monocyte % : 9.4 %  Auto Eosinophil % : 3.4 %  Auto Basophil % : 0.4 %    12-17    144  |  105  |  15  ----------------------------<  88  3.7   |  33<H>  |  0.74    Ca    8.4      17 Dec 2020 07:03  Phos  3.3     12-17  Mg     2.0     12-17    TPro  6.8  /  Alb  1.8<L>  /  TBili  1.2  /  DBili  x   /  AST  39  /  ALT  26  /  AlkPhos  276<H>  12-17              [  ]  DVT Prophylaxis  [  ]  Nutrition, Brand, Rate         Goal Rate        Abnormal Nutritional Status -  Malnutrition   Cachexia      Morbid Obesity BMI >/=40    RADIOLOGY & ADDITIONAL STUDIES:  ***    Goals of Care Discussion with Family/Proxy/Other   - see note from/family meeting set up for...

## 2020-12-17 NOTE — PROGRESS NOTE ADULT - ASSESSMENT
Patient is a 65y old  Female from home, lives with daughter, ambulates with walker with PMH of recurrent SBO's, s/p exp lap, SB resection in 2015, ex lap, ALBINA in 2018, DVT, PE, on Xarelto, IVC filter, chronic leg swelling, and anasarca, Now send in to the ER by her PCP, Dr. Ross, for evaluation of  generalized weakness and hypotension BP of 80/40 during office visit. On admission, she found to have no fever and BP was in lower normal range and no Leukocytosis. The CXR is clear and CT abd/pelvis consistent with Ileus. The ID consult requested to assist with evaluation of infectious etiology of episode of hypotension. Found to have Bacteroides bacteremia and now developed septic shock on Cefepime and Flagyl. Hence transferred to ICU. 10/20/20.    # Hypotension  at office- BP of 80/40 - resolved   #  Bacteroides fragilis Bacteremia - 10/13/20 - ? source most likely GI- Repeat Blood Cxs have NGTD 10/16/20  # Ileus  - h/o recurrent SBO and s/p EXp. LAp  # COVID 19 negative   # Septic shock ( Hypothermia + hypotensive)- transferred to ICU since requiring pressor- source GI, The CT abd/pelvis shows nonspecific enterocolitis, noninfectious inflammatory bowel disease, or ischemic bowel, along with ileus.   # Pneumonia- HCAP vs Aspiration - on CXR 10/24 and CT chest 10/21- sputum Cx grew Methicillin resistant Staphylococcus aureus  and Stenotrophomonas maltophilia.  # S/p extubated 11/9/20  # Infected sacral ulcer- s/p debridement and culture grew Enterococcus, VRE and Candida, sensitivity is pending- The MRI of pelvis shows  Osteomyelitis of the lower sacrum and coccyx with overlying cutaneous ulceration.   # Fever- 11/8 and 11/19- BCX NGTD 11/19 - The CXR shows Improvement in bilateral airspace disease with persistent bibasilar consolidation. She is s/p removal of velazquez catheter and passed TOV.  # HCAP- 11/27/20,  Intermittent fever and CXR shows  bilateral consolidations right greater than left. - The procalcitonin level is 1.55  # s/p PICC line placement 11/30  # Persistent effusions left greater than right      would recommend:    1. Monitor Temp. and c/w supportive care   2. Continue oral Linezolid  and oral fluconazole until  12/29/20  3. Aspiration precaution  4. Frequent repositioning   5. OOB to chair      Attending Attestation:    Spent more than 35 minutes on total encounter, more than 50 % of the visit was spent counseling and/or coordinating care by the Attending physician.             Patient is a 65y old  Female from home, lives with daughter, ambulates with walker with PMH of recurrent SBO's, s/p exp lap, SB resection in 2015, ex lap, ALBINA in 2018, DVT, PE, on Xarelto, IVC filter, chronic leg swelling, and anasarca, Now send in to the ER by her PCP, Dr. Ross, for evaluation of  generalized weakness and hypotension BP of 80/40 during office visit. On admission, she found to have no fever and BP was in lower normal range and no Leukocytosis. The CXR is clear and CT abd/pelvis consistent with Ileus. The ID consult requested to assist with evaluation of infectious etiology of episode of hypotension. Found to have Bacteroides bacteremia and now developed septic shock on Cefepime and Flagyl. Hence transferred to ICU. 10/20/20.    # Hypotension  at office- BP of 80/40 - resolved   #  Bacteroides fragilis Bacteremia - 10/13/20 - ? source most likely GI- Repeat Blood Cxs have NGTD 10/16/20  # Ileus  - h/o recurrent SBO and s/p EXp. LAp  # COVID 19 negative   # Septic shock ( Hypothermia + hypotensive)- transferred to ICU since requiring pressor- source GI, The CT abd/pelvis shows nonspecific enterocolitis, noninfectious inflammatory bowel disease, or ischemic bowel, along with ileus.   # Pneumonia- HCAP vs Aspiration - on CXR 10/24 and CT chest 10/21- sputum Cx grew Methicillin resistant Staphylococcus aureus  and Stenotrophomonas maltophilia.  # S/p extubated 11/9/20  # Infected sacral ulcer- s/p debridement and culture grew Enterococcus, VRE and Candida, sensitivity is pending- The MRI of pelvis shows  Osteomyelitis of the lower sacrum and coccyx with overlying cutaneous ulceration.   # Fever- 11/8 and 11/19- BCX NGTD 11/19 - The CXR shows Improvement in bilateral airspace disease with persistent bibasilar consolidation. She is s/p removal of velazquez catheter and passed TOV.  # HCAP- 11/27/20,  Intermittent fever and CXR shows  bilateral consolidations right greater than left. - The procalcitonin level is 1.55  # s/p PICC line placement 11/30  # Persistent effusions left greater than right      would recommend:    1 Please Surgery follow up for further debridement of Sacral ulcer since still spiking fever  2. Monitor Temp. and c/w supportive care   3. Continue oral Linezolid  and oral fluconazole until  12/29/20  4. Aspiration precaution  5. Frequent repositioning   6. OOB to chair      Attending Attestation:    Spent more than 35 minutes on total encounter, more than 50 % of the visit was spent counseling and/or coordinating care by the Attending physician.

## 2020-12-17 NOTE — PROGRESS NOTE ADULT - PROBLEM SELECTOR PLAN 1
Bacteroides fragilis per BC on 10/13  Sputum positive for MRSA and Stenotrophomonas  Aspiration pneumonia vs HCAP .   Repeat BC 10/16 and 11/19 NGTD ,UC on 11/20 NGTD   Infected sacral ulcer- s/p debridement and culture grew Enterococcus (VRE) and Candida  Leukocytosis resolved, Cdiff negative  MRI sacrum/pelvis shows Osteomyelitis  c/w abx per ID Dapto until 12/28.  linezolid 600mg  q12h D4/14. Fluconazole 200mg daily until 12/29.

## 2020-12-17 NOTE — PROGRESS NOTE ADULT - NS PRO AD PATIENT TYPE
Medical Orders for Life-Sustaining Treatment (MOLST)/Do Not Resuscitate (DNR)
trial of intubation/Do Not Resuscitate (DNR)

## 2020-12-17 NOTE — PROGRESS NOTE ADULT - ASSESSMENT
65y Female from home, lives with daughter, ambulates with walker with PMH of recurrent SBO's, s/p exp lap, SB resection in 2015, ex lap, ALBINA in 2018, DVT, PE, on Xarelto, IVC filter, chronic leg swelling, and anasarca, came in to the ED due to hypotension during office visit. Patient was found to be bacteremic with bacteroids fragilis. Admitted in ICU due to hypotension and hypothermia. patient completed course of antibiotics . BCx X2 negative. Ammonia level elevated and patient refused NGT placement, mental status deteriorated and patient desaturated to high 70%, patient got intubated on 10/24 . Sputum was positive for MRSA and Stenotrophomonas. patient started on vancomycin and Levaquin. patient was given lactulose for high ammonia level. kidney function and liver function started to deteriorate and patient was leaning toward multi organ failure . high dose of Lasix started and patient was aggressively diuresed. Kidney function improved . Ammonia level dropped to less than 10.  Mental status improved and patient extubated on 11/3/2020. Patient was seen by wound specialist and decubitus ulcer debrided. patient had a drop in h/h s/p debridement . Received 1 unit PRBC .                 11/12  Transferred from ICU to SCU  `11/17  Midodrine d/c secondary to daptomycin being started.  11/18 Febrile, Tmax 101.5. Repeat BC sent.   11/19 Discontinue velazquez.   11/20 febrile overnight.   11/22 s/p 2 units PRBC for Hgb 6.4  11/24 MRI pelvis (+) OM  11/25 PAVAN no endocarditis. Awaiting PICC  11/27 Spiked fever of 101F; Repeated cultures, chest xray, lactate, Follow up results. IR Denies PICC Placement due to fever. Plan for IR Monday (NPO Sunday) if afebrile over the weekend. Given one dose of lasix IV today for pleural effusion on chest xray; Give one more dose of IV Lasix 40mg tomorrow at 2pm if BP stable. Continue with daily lasix.     12/14 PICC line removed  12/17- low grade fever, cxr negative, no leukocytosis, procalcitonin trending down

## 2020-12-17 NOTE — PROGRESS NOTE ADULT - SUBJECTIVE AND OBJECTIVE BOX
Patient is seen and examined at the bed side,  is afebrile now, spiking fever.  The WBC count and creatinine stay normal.  The Procalcitonin level trended down to 0.98 from 1.55.      REVIEW OF SYSTEMS: All other review systems are negative      ALLERGIES: No Known Allergies      Vital Signs Last 24 Hrs  T(C): 36.8 (17 Dec 2020 13:30), Max: 38.1 (17 Dec 2020 00:09)  T(F): 98.2 (17 Dec 2020 13:30), Max: 100.5 (17 Dec 2020 00:09)  HR: 97 (17 Dec 2020 13:30) (94 - 113)  BP: 113/72 (17 Dec 2020 13:30) (91/56 - 113/72)  BP(mean): --  RR: 18 (17 Dec 2020 13:30) (17 - 19)  SpO2: 99% (17 Dec 2020 13:30) (97% - 99%)      PHYSICAL EXAM:  GENERAL: Not in distress, off oxygen   CHEST/LUNG: Not using accessory muscles   HEART: s1 and s2 present  ABDOMEN:  Nontender and  Nondistended  EXTREMITIES: B/L UE edematous improved  CNS: Awake and Alert         LABS:                          7.4    9.24  )-----------( 300      ( 17 Dec 2020 07:03 )             24.6                           7.5    9.74  )-----------( 310      ( 16 Dec 2020 07:22 )             25.3                           9.4    13.14 )-----------( 149      ( 14 Nov 2020 07:58 )             28.7       12-17    144  |  105  |  15  ----------------------------<  88  3.7   |  33<H>  |  0.74    Ca    8.4      17 Dec 2020 07:03  Phos  3.3     12-17  Mg     2.0     12-17    TPro  6.8  /  Alb  1.8<L>  /  TBili  1.2  /  DBili  x   /  AST  39  /  ALT  26  /  AlkPhos  276<H>  12-17 11-06    138  |  104  |  37<H>  ----------------------------<  81  3.9   |  25  |  2.37<H>    Ca    8.1<L>      06 Nov 2020 06:20  Phos  3.4     11-06  Mg     2.0     11-06    TPro  5.1<L>  /  Alb  2.8<L>  /  TBili  9.2<H>  /  DBili  x   /  AST  233<H>  /  ALT  99<H>  /  AlkPhos  96  11-06      Procalcitonin, Serum (12.16.20 @ 19:11)   Procalcitonin, Serum: 0.98:     Vancomycin Level, Trough (11.07.20 @ 21:49)   Vancomycin Level, Trough: 16.1:     Vancomycin Level, Trough (11.07.20 @ 07:08) : 19.5  Vancomycin Level, Trough (11.06.20 @ 06:20) : 19.9:   Procalcitonin, Serum (11.28.20 @ 15:38) : 1.55:       MEDICATIONS  (STANDING):    ascorbic acid 500 milliGRAM(s) Oral two times a day  chlorhexidine 2% Cloths 1 Application(s) Topical daily  enoxaparin Injectable 50 milliGRAM(s) SubCutaneous daily  fluconAZOLE   Tablet 200 milliGRAM(s) Oral daily  furosemide    Tablet 20 milliGRAM(s) Oral daily  lactobacillus acidophilus 1 Tablet(s) Oral daily  linezolid    Tablet 600 milliGRAM(s) Oral every 12 hours  multivitamin/minerals 1 Tablet(s) Oral daily  nystatin Powder 1 Application(s) Topical two times a day  pantoprazole    Tablet 40 milliGRAM(s) Oral before breakfast  rifAXIMin 550 milliGRAM(s) Oral two times a day  zinc sulfate 220 milliGRAM(s) Oral daily      RADIOLOGY & ADDITIONAL TESTS:    12/9/20 : Xray Chest 1 View- PORTABLE-Routine (Xray Chest 1 View- PORTABLE-Routine in AM.) (12.09.20 @ 11:54) >  IMPRESSION: Persistent effusions left greater than right.      11/27/20 : Xray Chest 1 View- PORTABLE-Urgent (Xray Chest 1 View- PORTABLE-Urgent .) (11.27.20 @ 06:53) Increased interstitial lung markings with bilateral consolidations right greater than left. Small right pleural effusion. Overall worsening compared to prior exam.      11/24/20 : MR Pelvis Bony Only No Cont (11.24.20 @ 18:02) : Osteomyelitis of the lower sacrum and coccyx with overlying cutaneous ulceration,      11/19/20 : Xray Chest 1 View- PORTABLE-Urgent (Xray Chest 1 View- PORTABLE-Urgent .) (11.19.20 @ 23:53): Improvement in bilateral airspace disease with persistent bibasilar consolidation and small effusions.    11/4/20 : Xray Chest 1 View- PORTABLE-Routine (Xray Chest 1 View- PORTABLE-Routine in AM.) (11.04.20 @ 10:59) Reexpansion of the left lung with residual left pleural effusion and/or infiltrate.  Right pulmonary edema unchanged.  Tubes and catheters in satisfactory position.      10/25/20: Xray Chest 1 View- PORTABLE-Routine (Xray Chest 1 View- PORTABLE-Routine in AM.) (10.25.20 @ 09:21) Frontal expiratory view of the chest shows the heart to be similar in size. Endotracheal tube, right jugular line and feeding tube remain present. The lungs show similar left upper lobe infiltrate with progression of right perihilar infiltrate. Pleural effusions are similar. There is no evidence of pneumothorax.    10/21/20 : CT Abdomen and Pelvis w/ Oral Cont and w/ IV Cont (10.21.20 @ 15:59) Mural thickening of the left colon and rectum. Liquid stool in the colon. Apparent segmental mural thickening of the mid to distal small bowel and the proximal duodenum. Findings may represent nonspecific enterocolitis, noninfectious inflammatory bowel disease, or ischemic bowel. Clinical correlation is recommended. The celiac axis artery, SMA, and KINA are patent without stenosis.    Dilatation of the mid small bowel is again noted. Oral contrast has reached the terminal ileum. Findings may represent small bowel obstruction, or ileus related to nonspecific enterocolitis.   Cholelithiasis. Moderate to large ascites in the abdomen, increased since the previous examination. 5 mm nonspecific noncalcified left upper lobe lung nodule; if the patient's is in the high risk category (i.e. smoker), follow-up chest CT may be pursued in 12 months to ensure stability. Combination of atelectasis and consolidation in the left lower lobe.  Possible 1.0 cm hypodense lesion in the left lobe of the thyroid. Thyroid ultrasound may be pursued for further evaluation.   Mild bilateral pleural effusions.  Aging determinate compression fracture at T5 vertebra.        MICROBIOLOGY DATA:  MRSA/MSSA PCR (12.01.20 @ 01:16)   MRSA PCR Result.: Detected:    MRSA/MSSA PCR (11.28.20 @ 11:34)   MRSA PCR Result.: Detected:     Urine Microscopic-Add On (NC) (11.20.20 @ 04:12)   Red Blood Cell - Urine: 5-10 /HPF   White Blood Cell - Urine: 11-25 /HPF   Calcium Oxalate Crystals: Few /HPF   Bacteria: Moderate /HPF   Comment - Urine: few amorphous urates   Epithelial Cells: Few /HPF     Culture - Blood (11.19.20 @ 10:19)   Specimen Source: .Blood Blood-Peripheral   Culture Results: No growth to date.     Culture - Surgical Swab (11.12.20 @ 05:01)   - Ampicillin: R >8 Predicts results to ampicillin/sulbactam, amoxacillin-clavulanate and piperacillin-tazobactam.   - Levofloxacin: R >4   - Linezolid: S 1   - Tetra/Doxy: R >8   - Vancomycin: R >16   Specimen Source: .Surgical Swab Sacral decub   Culture Results:   Few Enterococcus faecium (vancomycin resistant)   Rare Candida albicans "Susceptibilities not performed"   Organism Identification: Enterococcus faecium (vancomycin resistant)   Organism: Enterococcus faecium (vancomycin resistant)   Method Type: STEWART     Culture - Surgical Swab (11.12.20 @ 05:01)   Specimen Source: .Surgical Swab Sacral decub   Culture Results:  Few Enterococcus faecium Susceptibility to follow.   Rare Candida albicans "Susceptibilities not performed"     Culture - Sputum . (10.26.20 @ 00:26)   - Ampicillin/Sulbactam: R <=8/4   - Cefazolin: R >16   - Ceftazidime: I 16   - Clindamycin: R >4   - Erythromycin: R >4   - Gentamicin: S <=1 Should not be used as monotherapy   - Levofloxacin: S <=0.5   - Linezolid: S 2   - Oxacillin: R >2   - Penicillin: R >8   - RIF- Rifampin: S <=1 Should not be used as monotherapy   - Tetra/Doxy: S <=1   - Trimethoprim/Sulfamethoxazole: S <=0.5/9.5   - Trimethoprim/Sulfamethoxazole: S <=0.5/9.5   - Vancomycin: S 1     MRSA/MSSA PCR (10.21.20 @ 09:41)   MRSA PCR Result.: Detected:       Culture - Blood (10.20.20 @ 22:09)   Specimen Source: .Blood Blood   Culture Results:  No growth to date.     Culture - Blood (10.20.20 @ 22:09)   Specimen Source: .Blood Blood   Culture Results:   No growth to date.     Culture - Blood in AM (10.16.20 @ 10:13)   Specimen Source: .Blood Blood-Peripheral   Culture Results: No growth to date.     Culture - Blood in AM (10.16.20 @ 10:13)   Specimen Source: .Blood Blood-Peripheral   Culture Results: No growth to date.     Culture - Blood (10.13.20 @ 22:23)   Growth in anaerobic bottle: Gram Negative Rods   Specimen Source: .Blood Blood-Peripheral   Organism: Blood Culture PCR   Culture Results: Growth in anaerobic bottle: Bacteroides fragilis   "Susceptibilities not performed"     Culture - Blood (10.13.20 @ 22:23)   Specimen Source: .Blood Blood-Peripheral   Culture Results:   No growth to date.          Patient is seen and examined at the bed side,  still spiking fever. The WBC count and creatinine stay normal.       REVIEW OF SYSTEMS: All other review systems are negative      ALLERGIES: No Known Allergies      Vital Signs Last 24 Hrs  T(C): 36.8 (17 Dec 2020 13:30), Max: 38.1 (17 Dec 2020 00:09)  T(F): 98.2 (17 Dec 2020 13:30), Max: 100.5 (17 Dec 2020 00:09)  HR: 97 (17 Dec 2020 13:30) (94 - 113)  BP: 113/72 (17 Dec 2020 13:30) (91/56 - 113/72)  BP(mean): --  RR: 18 (17 Dec 2020 13:30) (17 - 19)  SpO2: 99% (17 Dec 2020 13:30) (97% - 99%)      PHYSICAL EXAM:  GENERAL: Not in distress, off oxygen   CHEST/LUNG: Not using accessory muscles   HEART: s1 and s2 present  ABDOMEN:  Nontender and  Nondistended  EXTREMITIES: B/L UE edematous improved  CNS: Awake and Alert         LABS:                          7.4    9.24  )-----------( 300      ( 17 Dec 2020 07:03 )             24.6                           7.5    9.74  )-----------( 310      ( 16 Dec 2020 07:22 )             25.3                           9.4    13.14 )-----------( 149      ( 14 Nov 2020 07:58 )             28.7       12-17    144  |  105  |  15  ----------------------------<  88  3.7   |  33<H>  |  0.74    Ca    8.4      17 Dec 2020 07:03  Phos  3.3     12-17  Mg     2.0     12-17    TPro  6.8  /  Alb  1.8<L>  /  TBili  1.2  /  DBili  x   /  AST  39  /  ALT  26  /  AlkPhos  276<H>  12-17 11-06    138  |  104  |  37<H>  ----------------------------<  81  3.9   |  25  |  2.37<H>    Ca    8.1<L>      06 Nov 2020 06:20  Phos  3.4     11-06  Mg     2.0     11-06    TPro  5.1<L>  /  Alb  2.8<L>  /  TBili  9.2<H>  /  DBili  x   /  AST  233<H>  /  ALT  99<H>  /  AlkPhos  96  11-06      Procalcitonin, Serum (12.16.20 @ 19:11)   Procalcitonin, Serum: 0.98:     Vancomycin Level, Trough (11.07.20 @ 21:49)   Vancomycin Level, Trough: 16.1:     Vancomycin Level, Trough (11.07.20 @ 07:08) : 19.5  Vancomycin Level, Trough (11.06.20 @ 06:20) : 19.9:   Procalcitonin, Serum (11.28.20 @ 15:38) : 1.55:       MEDICATIONS  (STANDING):    ascorbic acid 500 milliGRAM(s) Oral two times a day  chlorhexidine 2% Cloths 1 Application(s) Topical daily  enoxaparin Injectable 50 milliGRAM(s) SubCutaneous daily  fluconAZOLE   Tablet 200 milliGRAM(s) Oral daily  furosemide    Tablet 20 milliGRAM(s) Oral daily  lactobacillus acidophilus 1 Tablet(s) Oral daily  linezolid    Tablet 600 milliGRAM(s) Oral every 12 hours  multivitamin/minerals 1 Tablet(s) Oral daily  nystatin Powder 1 Application(s) Topical two times a day  pantoprazole    Tablet 40 milliGRAM(s) Oral before breakfast  rifAXIMin 550 milliGRAM(s) Oral two times a day  zinc sulfate 220 milliGRAM(s) Oral daily      RADIOLOGY & ADDITIONAL TESTS:    12/9/20 : Xray Chest 1 View- PORTABLE-Routine (Xray Chest 1 View- PORTABLE-Routine in AM.) (12.09.20 @ 11:54) >  IMPRESSION: Persistent effusions left greater than right.      11/27/20 : Xray Chest 1 View- PORTABLE-Urgent (Xray Chest 1 View- PORTABLE-Urgent .) (11.27.20 @ 06:53) Increased interstitial lung markings with bilateral consolidations right greater than left. Small right pleural effusion. Overall worsening compared to prior exam.      11/24/20 : MR Pelvis Bony Only No Cont (11.24.20 @ 18:02) : Osteomyelitis of the lower sacrum and coccyx with overlying cutaneous ulceration,      11/19/20 : Xray Chest 1 View- PORTABLE-Urgent (Xray Chest 1 View- PORTABLE-Urgent .) (11.19.20 @ 23:53): Improvement in bilateral airspace disease with persistent bibasilar consolidation and small effusions.    11/4/20 : Xray Chest 1 View- PORTABLE-Routine (Xray Chest 1 View- PORTABLE-Routine in AM.) (11.04.20 @ 10:59) Reexpansion of the left lung with residual left pleural effusion and/or infiltrate.  Right pulmonary edema unchanged.  Tubes and catheters in satisfactory position.      10/25/20: Xray Chest 1 View- PORTABLE-Routine (Xray Chest 1 View- PORTABLE-Routine in AM.) (10.25.20 @ 09:21) Frontal expiratory view of the chest shows the heart to be similar in size. Endotracheal tube, right jugular line and feeding tube remain present. The lungs show similar left upper lobe infiltrate with progression of right perihilar infiltrate. Pleural effusions are similar. There is no evidence of pneumothorax.    10/21/20 : CT Abdomen and Pelvis w/ Oral Cont and w/ IV Cont (10.21.20 @ 15:59) Mural thickening of the left colon and rectum. Liquid stool in the colon. Apparent segmental mural thickening of the mid to distal small bowel and the proximal duodenum. Findings may represent nonspecific enterocolitis, noninfectious inflammatory bowel disease, or ischemic bowel. Clinical correlation is recommended. The celiac axis artery, SMA, and KINA are patent without stenosis.    Dilatation of the mid small bowel is again noted. Oral contrast has reached the terminal ileum. Findings may represent small bowel obstruction, or ileus related to nonspecific enterocolitis.   Cholelithiasis. Moderate to large ascites in the abdomen, increased since the previous examination. 5 mm nonspecific noncalcified left upper lobe lung nodule; if the patient's is in the high risk category (i.e. smoker), follow-up chest CT may be pursued in 12 months to ensure stability. Combination of atelectasis and consolidation in the left lower lobe.  Possible 1.0 cm hypodense lesion in the left lobe of the thyroid. Thyroid ultrasound may be pursued for further evaluation.   Mild bilateral pleural effusions.  Aging determinate compression fracture at T5 vertebra.        MICROBIOLOGY DATA:  MRSA/MSSA PCR (12.01.20 @ 01:16)   MRSA PCR Result.: Detected:    MRSA/MSSA PCR (11.28.20 @ 11:34)   MRSA PCR Result.: Detected:     Urine Microscopic-Add On (NC) (11.20.20 @ 04:12)   Red Blood Cell - Urine: 5-10 /HPF   White Blood Cell - Urine: 11-25 /HPF   Calcium Oxalate Crystals: Few /HPF   Bacteria: Moderate /HPF   Comment - Urine: few amorphous urates   Epithelial Cells: Few /HPF     Culture - Blood (11.19.20 @ 10:19)   Specimen Source: .Blood Blood-Peripheral   Culture Results: No growth to date.     Culture - Surgical Swab (11.12.20 @ 05:01)   - Ampicillin: R >8 Predicts results to ampicillin/sulbactam, amoxacillin-clavulanate and piperacillin-tazobactam.   - Levofloxacin: R >4   - Linezolid: S 1   - Tetra/Doxy: R >8   - Vancomycin: R >16   Specimen Source: .Surgical Swab Sacral decub   Culture Results:   Few Enterococcus faecium (vancomycin resistant)   Rare Candida albicans "Susceptibilities not performed"   Organism Identification: Enterococcus faecium (vancomycin resistant)   Organism: Enterococcus faecium (vancomycin resistant)   Method Type: STEWART     Culture - Surgical Swab (11.12.20 @ 05:01)   Specimen Source: .Surgical Swab Sacral decub   Culture Results:  Few Enterococcus faecium Susceptibility to follow.   Rare Candida albicans "Susceptibilities not performed"     Culture - Sputum . (10.26.20 @ 00:26)   - Ampicillin/Sulbactam: R <=8/4   - Cefazolin: R >16   - Ceftazidime: I 16   - Clindamycin: R >4   - Erythromycin: R >4   - Gentamicin: S <=1 Should not be used as monotherapy   - Levofloxacin: S <=0.5   - Linezolid: S 2   - Oxacillin: R >2   - Penicillin: R >8   - RIF- Rifampin: S <=1 Should not be used as monotherapy   - Tetra/Doxy: S <=1   - Trimethoprim/Sulfamethoxazole: S <=0.5/9.5   - Trimethoprim/Sulfamethoxazole: S <=0.5/9.5   - Vancomycin: S 1     MRSA/MSSA PCR (10.21.20 @ 09:41)   MRSA PCR Result.: Detected:       Culture - Blood (10.20.20 @ 22:09)   Specimen Source: .Blood Blood   Culture Results:  No growth to date.     Culture - Blood (10.20.20 @ 22:09)   Specimen Source: .Blood Blood   Culture Results:   No growth to date.     Culture - Blood in AM (10.16.20 @ 10:13)   Specimen Source: .Blood Blood-Peripheral   Culture Results: No growth to date.     Culture - Blood in AM (10.16.20 @ 10:13)   Specimen Source: .Blood Blood-Peripheral   Culture Results: No growth to date.     Culture - Blood (10.13.20 @ 22:23)   Growth in anaerobic bottle: Gram Negative Rods   Specimen Source: .Blood Blood-Peripheral   Organism: Blood Culture PCR   Culture Results: Growth in anaerobic bottle: Bacteroides fragilis   "Susceptibilities not performed"     Culture - Blood (10.13.20 @ 22:23)   Specimen Source: .Blood Blood-Peripheral   Culture Results:   No growth to date.

## 2020-12-18 NOTE — PROGRESS NOTE ADULT - SUBJECTIVE AND OBJECTIVE BOX
MARIBETH PADILLA    SCU progress note    INTERVAL HPI/OVERNIGHT EVENTS: ***    DNR [ ]   DNI  [  ]    Covid - 19 PCR:     The 4Ms    What Matters Most: see GOC  Age appropriate Medications/Screen for High Risk Medication: Yes  Mentation: see CAM below  Mobility: defer to physical exam    The Confusion Assessment Method (CAM) Diagnostic Algorithm     1: Acute Onset or Fluctuating Course  - Is there evidence of an acute change in mental status from the patient’s baseline? Did the (abnormal) behavior  fluctuate during the day, that is, tend to come and go, or increase and decrease in severity?  [ ] YES [x ] NO     2: Inattention  - Did the patient have difficulty focusing attention, being easily distractible, or having difficulty keeping track of what was being said?  [ ] YES [x ] NO     3: Disorganized thinking  -Was the patient’s thinking disorganized or incoherent, such as rambling or irrelevant conversation, unclear or illogical flow of ideas, or unpredictable switching from subject to subject?  [ ] YES [x ] NO    4: Altered Level of consciousness?  [ ] YES [x ] NO    The diagnosis of delirium by CAM requires the presence of features 1 and 2 and either 3 or 4.    PRESSORS: [ ] YES [x ] NO  fluconAZOLE   Tablet 200 milliGRAM(s) Oral daily  linezolid    Tablet 600 milliGRAM(s) Oral every 12 hours  rifAXIMin 550 milliGRAM(s) Oral two times a day    Cardiovascular:  Heart Failure  Acute   Acute on Chronic  Chronic       furosemide    Tablet 20 milliGRAM(s) Oral daily    Pulmonary:  ALBUTerol    90 MICROgram(s) HFA Inhaler 2 Puff(s) Inhalation every 6 hours PRN    Hematalogic:  enoxaparin Injectable 50 milliGRAM(s) SubCutaneous daily    Other:  acetaminophen   Tablet .. 650 milliGRAM(s) Oral every 6 hours PRN  ascorbic acid 500 milliGRAM(s) Oral two times a day  chlorhexidine 2% Cloths 1 Application(s) Topical daily  lactobacillus acidophilus 1 Tablet(s) Oral daily  multivitamin/minerals 1 Tablet(s) Oral daily  nystatin Powder 1 Application(s) Topical two times a day  pantoprazole    Tablet 40 milliGRAM(s) Oral before breakfast  sodium chloride 0.9% lock flush 10 milliLiter(s) IV Push every 1 hour PRN  zinc sulfate 220 milliGRAM(s) Oral daily    acetaminophen   Tablet .. 650 milliGRAM(s) Oral every 6 hours PRN  ALBUTerol    90 MICROgram(s) HFA Inhaler 2 Puff(s) Inhalation every 6 hours PRN  ascorbic acid 500 milliGRAM(s) Oral two times a day  chlorhexidine 2% Cloths 1 Application(s) Topical daily  enoxaparin Injectable 50 milliGRAM(s) SubCutaneous daily  fluconAZOLE   Tablet 200 milliGRAM(s) Oral daily  furosemide    Tablet 20 milliGRAM(s) Oral daily  lactobacillus acidophilus 1 Tablet(s) Oral daily  linezolid    Tablet 600 milliGRAM(s) Oral every 12 hours  multivitamin/minerals 1 Tablet(s) Oral daily  nystatin Powder 1 Application(s) Topical two times a day  pantoprazole    Tablet 40 milliGRAM(s) Oral before breakfast  rifAXIMin 550 milliGRAM(s) Oral two times a day  sodium chloride 0.9% lock flush 10 milliLiter(s) IV Push every 1 hour PRN  zinc sulfate 220 milliGRAM(s) Oral daily    Drug Dosing Weight  Height (cm): 167.6 (21 Oct 2020 02:26)  Weight (kg): 56.2 (21 Oct 2020 02:26)  BMI (kg/m2): 20 (21 Oct 2020 02:26)  BSA (m2): 1.63 (21 Oct 2020 02:26)    CENTRAL LINE: [ ] YES [ ] NO  LOCATION:   DATE INSERTED:  REMOVE: [ ] YES [ ] NO  EXPLAIN:    TREJO: [ ] YES [ ] NO    DATE INSERTED:  REMOVE:  [ ] YES [ ] NO  EXPLAIN:    PAST MEDICAL & SURGICAL HISTORY:  Anasarca    Pulmonary embolism    DVT (deep venous thrombosis)    SBO (small bowel obstruction)    H/O exploratory laparotomy                      PHYSICAL EXAM:    GENERAL: NAD, well-groomed, well-developed  HEAD:  Atraumatic, Normocephalic  EYES: EOMI, PERRL, conjunctiva and sclera clear  ENMT: No tonsillar erythema, exudates, or enlargement; Moist mucous membranes, Good dentition, No lesions  NECK: Supple, No JVD, Normal thyroid  NERVOUS SYSTEM:  Alert & Oriented X2, moving all extremities,   CHEST/LUNG: Clear to percussion bilaterally; No rales, rhonchi, wheezing, or rubs  HEART: Regular rate and rhythm; No murmurs, rubs, or gallops  ABDOMEN: Soft, Nontender, Nondistended; Bowel sounds present  EXTREMITIES:  2+ Peripheral Pulses, No clubbing, cyanosis, or b/l hands w/ 1+ edema  LYMPH: No lymphadenopathy noted  SKIN: unstageable sacral decube      LABS:  CBC Full  -  ( 18 Dec 2020 07:05 )  WBC Count : 8.52 K/uL  RBC Count : 3.16 M/uL  Hemoglobin : 9.0 g/dL  Hematocrit : 30.2 %  Platelet Count - Automated : 330 K/uL  Mean Cell Volume : 95.6 fl  Mean Cell Hemoglobin : 28.5 pg  Mean Cell Hemoglobin Concentration : 29.8 gm/dL  Auto Neutrophil # : x  Auto Lymphocyte # : x  Auto Monocyte # : x  Auto Eosinophil # : x  Auto Basophil # : x  Auto Neutrophil % : x  Auto Lymphocyte % : x  Auto Monocyte % : x  Auto Eosinophil % : x  Auto Basophil % : x    12-18    146<H>  |  106  |  14  ----------------------------<  77  3.8   |  33<H>  |  0.68    Ca    8.9      18 Dec 2020 07:05  Phos  3.3     12-17  Mg     2.0     12-17    TPro  6.8  /  Alb  1.8<L>  /  TBili  1.2  /  DBili  x   /  AST  39  /  ALT  26  /  AlkPhos  276<H>  12-17              [  ]  DVT Prophylaxis  [  ]  Nutrition, Brand, Rate         Goal Rate        Abnormal Nutritional Status -  Malnutrition   Cachexia      Morbid Obesity BMI >/=40    RADIOLOGY & ADDITIONAL STUDIES:  ***    Goals of Care Discussion with Family/Proxy/Other   - see note from/family meeting set up for...     MARIBETH PADILLA    SCU progress note    INTERVAL HPI/OVERNIGHT EVENTS: No acute events-    DNR [x ]   DNI  [  ] - Trial of intubation    Covid - 19 PCR:     The 4Ms    What Matters Most: see GOC  Age appropriate Medications/Screen for High Risk Medication: Yes  Mentation: see CAM below  Mobility: defer to physical exam    The Confusion Assessment Method (CAM) Diagnostic Algorithm     1: Acute Onset or Fluctuating Course  - Is there evidence of an acute change in mental status from the patient’s baseline? Did the (abnormal) behavior  fluctuate during the day, that is, tend to come and go, or increase and decrease in severity?  [ ] YES [x ] NO     2: Inattention  - Did the patient have difficulty focusing attention, being easily distractible, or having difficulty keeping track of what was being said?  [ ] YES [x ] NO     3: Disorganized thinking  -Was the patient’s thinking disorganized or incoherent, such as rambling or irrelevant conversation, unclear or illogical flow of ideas, or unpredictable switching from subject to subject?  [ ] YES [x ] NO    4: Altered Level of consciousness?  [ ] YES [x ] NO    The diagnosis of delirium by CAM requires the presence of features 1 and 2 and either 3 or 4.    PRESSORS: [ ] YES [x ] NO  fluconAZOLE   Tablet 200 milliGRAM(s) Oral daily  linezolid    Tablet 600 milliGRAM(s) Oral every 12 hours  rifAXIMin 550 milliGRAM(s) Oral two times a day    Cardiovascular:  Heart Failure  Acute   Acute on Chronic  Chronic       furosemide    Tablet 20 milliGRAM(s) Oral daily    Pulmonary:  ALBUTerol    90 MICROgram(s) HFA Inhaler 2 Puff(s) Inhalation every 6 hours PRN    Hematalogic:  enoxaparin Injectable 50 milliGRAM(s) SubCutaneous daily    Other:  acetaminophen   Tablet .. 650 milliGRAM(s) Oral every 6 hours PRN  ascorbic acid 500 milliGRAM(s) Oral two times a day  chlorhexidine 2% Cloths 1 Application(s) Topical daily  lactobacillus acidophilus 1 Tablet(s) Oral daily  multivitamin/minerals 1 Tablet(s) Oral daily  nystatin Powder 1 Application(s) Topical two times a day  pantoprazole    Tablet 40 milliGRAM(s) Oral before breakfast  sodium chloride 0.9% lock flush 10 milliLiter(s) IV Push every 1 hour PRN  zinc sulfate 220 milliGRAM(s) Oral daily    acetaminophen   Tablet .. 650 milliGRAM(s) Oral every 6 hours PRN  ALBUTerol    90 MICROgram(s) HFA Inhaler 2 Puff(s) Inhalation every 6 hours PRN  ascorbic acid 500 milliGRAM(s) Oral two times a day  chlorhexidine 2% Cloths 1 Application(s) Topical daily  enoxaparin Injectable 50 milliGRAM(s) SubCutaneous daily  fluconAZOLE   Tablet 200 milliGRAM(s) Oral daily  furosemide    Tablet 20 milliGRAM(s) Oral daily  lactobacillus acidophilus 1 Tablet(s) Oral daily  linezolid    Tablet 600 milliGRAM(s) Oral every 12 hours  multivitamin/minerals 1 Tablet(s) Oral daily  nystatin Powder 1 Application(s) Topical two times a day  pantoprazole    Tablet 40 milliGRAM(s) Oral before breakfast  rifAXIMin 550 milliGRAM(s) Oral two times a day  sodium chloride 0.9% lock flush 10 milliLiter(s) IV Push every 1 hour PRN  zinc sulfate 220 milliGRAM(s) Oral daily    Drug Dosing Weight  Height (cm): 167.6 (21 Oct 2020 02:26)  Weight (kg): 56.2 (21 Oct 2020 02:26)  BMI (kg/m2): 20 (21 Oct 2020 02:26)  BSA (m2): 1.63 (21 Oct 2020 02:26)    CENTRAL LINE: [ ] YES [ ] NO  LOCATION:   DATE INSERTED:  REMOVE: [ ] YES [ ] NO  EXPLAIN:    TREJO: [ ] YES [ ] NO    DATE INSERTED:  REMOVE:  [ ] YES [ ] NO  EXPLAIN:    PAST MEDICAL & SURGICAL HISTORY:  Anasarca    Pulmonary embolism    DVT (deep venous thrombosis)    SBO (small bowel obstruction)    H/O exploratory laparotomy                      PHYSICAL EXAM:    GENERAL: NAD, well-groomed, well-developed  HEAD:  Atraumatic, Normocephalic  EYES: EOMI, PERRL, conjunctiva and sclera clear  ENMT: No tonsillar erythema, exudates, or enlargement; Moist mucous membranes, Good dentition, No lesions  NECK: Supple, No JVD, Normal thyroid  NERVOUS SYSTEM:  Alert & Oriented X2, moving all extremities,   CHEST/LUNG: Clear to percussion bilaterally; No rales, rhonchi, wheezing, or rubs  HEART: Regular rate and rhythm; No murmurs, rubs, or gallops  ABDOMEN: Soft, Nontender, Nondistended; Bowel sounds present  EXTREMITIES:  2+ Peripheral Pulses, No clubbing, cyanosis, or b/l hands w/ 1+ edema  LYMPH: No lymphadenopathy noted  SKIN: unstageable sacral decube      LABS:  CBC Full  -  ( 18 Dec 2020 07:05 )  WBC Count : 8.52 K/uL  RBC Count : 3.16 M/uL  Hemoglobin : 9.0 g/dL  Hematocrit : 30.2 %  Platelet Count - Automated : 330 K/uL  Mean Cell Volume : 95.6 fl  Mean Cell Hemoglobin : 28.5 pg  Mean Cell Hemoglobin Concentration : 29.8 gm/dL  Auto Neutrophil # : x  Auto Lymphocyte # : x  Auto Monocyte # : x  Auto Eosinophil # : x  Auto Basophil # : x  Auto Neutrophil % : x  Auto Lymphocyte % : x  Auto Monocyte % : x  Auto Eosinophil % : x  Auto Basophil % : x    12-18    146<H>  |  106  |  14  ----------------------------<  77  3.8   |  33<H>  |  0.68    Ca    8.9      18 Dec 2020 07:05  Phos  3.3     12-17  Mg     2.0     12-17    TPro  6.8  /  Alb  1.8<L>  /  TBili  1.2  /  DBili  x   /  AST  39  /  ALT  26  /  AlkPhos  276<H>  12-17              [  ]  DVT Prophylaxis  [  ]  Nutrition, Brand, Rate         Goal Rate        Abnormal Nutritional Status -  Malnutrition   Cachexia      Morbid Obesity BMI >/=40    RADIOLOGY & ADDITIONAL STUDIES:  ***    Goals of Care Discussion with Family/Proxy/Other   - see note from/family meeting set up for...     MARIBETH PADILLA    SCU progress note    INTERVAL HPI/OVERNIGHT EVENTS: No acute events-    DNR [x ]   DNI  [  ] - Trial of intubation    Covid - 19 PCR: Negative 12/18.      The 4Ms    What Matters Most: see [facility]  Age appropriate Medications/Screen for High Risk Medication: Yes  Mentation: see CAM below  Mobility: defer to physical exam    The Confusion Assessment Method (CAM) Diagnostic Algorithm     1: Acute Onset or Fluctuating Course  - Is there evidence of an acute change in mental status from the patient’s baseline? Did the (abnormal) behavior  fluctuate during the day, that is, tend to come and go, or increase and decrease in severity?  [ ] YES [x ] NO     2: Inattention  - Did the patient have difficulty focusing attention, being easily distractible, or having difficulty keeping track of what was being said?  [ ] YES [x ] NO     3: Disorganized thinking  -Was the patient’s thinking disorganized or incoherent, such as rambling or irrelevant conversation, unclear or illogical flow of ideas, or unpredictable switching from subject to subject?  [ ] YES [x ] NO    4: Altered Level of consciousness?  [ ] YES [x ] NO    The diagnosis of delirium by CAM requires the presence of features 1 and 2 and either 3 or 4.    PRESSORS: [ ] YES [x ] NO  fluconAZOLE   Tablet 200 milliGRAM(s) Oral daily  linezolid    Tablet 600 milliGRAM(s) Oral every 12 hours  rifAXIMin 550 milliGRAM(s) Oral two times a day    Cardiovascular:  Heart Failure  Acute   Acute on Chronic  Chronic       furosemide    Tablet 20 milliGRAM(s) Oral daily    Pulmonary:  ALBUTerol    90 MICROgram(s) HFA Inhaler 2 Puff(s) Inhalation every 6 hours PRN    Hematalogic:  enoxaparin Injectable 50 milliGRAM(s) SubCutaneous daily    Other:  acetaminophen   Tablet .. 650 milliGRAM(s) Oral every 6 hours PRN  ascorbic acid 500 milliGRAM(s) Oral two times a day  chlorhexidine 2% Cloths 1 Application(s) Topical daily  lactobacillus acidophilus 1 Tablet(s) Oral daily  multivitamin/minerals 1 Tablet(s) Oral daily  nystatin Powder 1 Application(s) Topical two times a day  pantoprazole    Tablet 40 milliGRAM(s) Oral before breakfast  sodium chloride 0.9% lock flush 10 milliLiter(s) IV Push every 1 hour PRN  zinc sulfate 220 milliGRAM(s) Oral daily    acetaminophen   Tablet .. 650 milliGRAM(s) Oral every 6 hours PRN  ALBUTerol    90 MICROgram(s) HFA Inhaler 2 Puff(s) Inhalation every 6 hours PRN  ascorbic acid 500 milliGRAM(s) Oral two times a day  chlorhexidine 2% Cloths 1 Application(s) Topical daily  enoxaparin Injectable 50 milliGRAM(s) SubCutaneous daily  fluconAZOLE   Tablet 200 milliGRAM(s) Oral daily  furosemide    Tablet 20 milliGRAM(s) Oral daily  lactobacillus acidophilus 1 Tablet(s) Oral daily  linezolid    Tablet 600 milliGRAM(s) Oral every 12 hours  multivitamin/minerals 1 Tablet(s) Oral daily  nystatin Powder 1 Application(s) Topical two times a day  pantoprazole    Tablet 40 milliGRAM(s) Oral before breakfast  rifAXIMin 550 milliGRAM(s) Oral two times a day  sodium chloride 0.9% lock flush 10 milliLiter(s) IV Push every 1 hour PRN  zinc sulfate 220 milliGRAM(s) Oral daily    Drug Dosing Weight  Height (cm): 167.6 (21 Oct 2020 02:26)  Weight (kg): 56.2 (21 Oct 2020 02:26)  BMI (kg/m2): 20 (21 Oct 2020 02:26)  BSA (m2): 1.63 (21 Oct 2020 02:26)    CENTRAL LINE: [ ] YES [ ] NO  LOCATION:   DATE INSERTED:  REMOVE: [ ] YES [ ] NO  EXPLAIN:    TREJO: [ ] YES [ ] NO    DATE INSERTED:  REMOVE:  [ ] YES [ ] NO  EXPLAIN:    PAST MEDICAL & SURGICAL HISTORY:  Anasarca    Pulmonary embolism    DVT (deep venous thrombosis)    SBO (small bowel obstruction)    H/O exploratory laparotomy                      PHYSICAL EXAM:    GENERAL: NAD, well-groomed, well-developed  HEAD:  Atraumatic, Normocephalic  EYES: EOMI, PERRL, conjunctiva and sclera clear  ENMT: No tonsillar erythema, exudates, or enlargement; Moist mucous membranes, Good dentition, No lesions  NECK: Supple, No JVD, Normal thyroid  NERVOUS SYSTEM:  Alert & Oriented X2, moving all extremities,   CHEST/LUNG: Clear to percussion bilaterally; No rales, rhonchi, wheezing, or rubs  HEART: Regular rate and rhythm; No murmurs, rubs, or gallops  ABDOMEN: Soft, Nontender, Nondistended; Bowel sounds present  EXTREMITIES:  2+ Peripheral Pulses, No clubbing, cyanosis, or b/l hands w/ 1+ edema  LYMPH: No lymphadenopathy noted  SKIN: unstageable sacral decube      LABS:  CBC Full  -  ( 18 Dec 2020 07:05 )  WBC Count : 8.52 K/uL  RBC Count : 3.16 M/uL  Hemoglobin : 9.0 g/dL  Hematocrit : 30.2 %  Platelet Count - Automated : 330 K/uL  Mean Cell Volume : 95.6 fl  Mean Cell Hemoglobin : 28.5 pg  Mean Cell Hemoglobin Concentration : 29.8 gm/dL  Auto Neutrophil # : x  Auto Lymphocyte # : x  Auto Monocyte # : x  Auto Eosinophil # : x  Auto Basophil # : x  Auto Neutrophil % : x  Auto Lymphocyte % : x  Auto Monocyte % : x  Auto Eosinophil % : x  Auto Basophil % : x    12-18    146<H>  |  106  |  14  ----------------------------<  77  3.8   |  33<H>  |  0.68    Ca    8.9      18 Dec 2020 07:05  Phos  3.3     12-17  Mg     2.0     12-17    TPro  6.8  /  Alb  1.8<L>  /  TBili  1.2  /  DBili  x   /  AST  39  /  ALT  26  /  AlkPhos  276<H>  12-17              [  ]  DVT Prophylaxis  [  ]  Nutrition, Brand, Rate         Goal Rate        Abnormal Nutritional Status -  Malnutrition   Cachexia      Morbid Obesity BMI >/=40    RADIOLOGY & ADDITIONAL STUDIES:  ***    Goals of Care Discussion with Family/Proxy/Other   - see note from/family meeting set up for...

## 2020-12-18 NOTE — PROGRESS NOTE ADULT - ASSESSMENT
Patient is a 65y old  Female from home, lives with daughter, ambulates with walker with PMH of recurrent SBO's, s/p exp lap, SB resection in 2015, ex lap, ALBINA in 2018, DVT, PE, on Xarelto, IVC filter, chronic leg swelling, and anasarca, Now send in to the ER by her PCP, Dr. Ross, for evaluation of  generalized weakness and hypotension BP of 80/40 during office visit. On admission, she found to have no fever and BP was in lower normal range and no Leukocytosis. The CXR is clear and CT abd/pelvis consistent with Ileus. The ID consult requested to assist with evaluation of infectious etiology of episode of hypotension. Found to have Bacteroides bacteremia and now developed septic shock on Cefepime and Flagyl. Hence transferred to ICU. 10/20/20.    # Hypotension  at office- BP of 80/40 - resolved   #  Bacteroides fragilis Bacteremia - 10/13/20 - ? source most likely GI- Repeat Blood Cxs have NGTD 10/16/20  # Ileus  - h/o recurrent SBO and s/p EXp. LAp  # COVID 19 negative   # Septic shock ( Hypothermia + hypotensive)- transferred to ICU since requiring pressor- source GI, The CT abd/pelvis shows nonspecific enterocolitis, noninfectious inflammatory bowel disease, or ischemic bowel, along with ileus.   # Pneumonia- HCAP vs Aspiration - on CXR 10/24 and CT chest 10/21- sputum Cx grew Methicillin resistant Staphylococcus aureus  and Stenotrophomonas maltophilia.  # S/p extubated 11/9/20  # Infected sacral ulcer- s/p debridement and culture grew Enterococcus, VRE and Candida, sensitivity is pending- The MRI of pelvis shows  Osteomyelitis of the lower sacrum and coccyx with overlying cutaneous ulceration.   # Fever- 11/8 and 11/19- BCX NGTD 11/19 - The CXR shows Improvement in bilateral airspace disease with persistent bibasilar consolidation. She is s/p removal of velazquez catheter and passed TOV.  # HCAP- 11/27/20,  Intermittent fever and CXR shows  bilateral consolidations right greater than left. - The procalcitonin level is 1.55  # s/p PICC line placement 11/30  # Persistent effusions left greater than right      would recommend:    1 Please Surgery follow up for further debridement of Sacral ulcer since still spiking fever  2. Monitor Temp. and c/w supportive care   3. Continue oral Linezolid  and oral fluconazole until  12/29/20  4. Aspiration precaution  5. Frequent repositioning   6. OOB to chair      Attending Attestation:    Spent more than 35 minutes on total encounter, more than 50 % of the visit was spent counseling and/or coordinating care by the Attending physician.         Patient is a 65y old  Female from home, lives with daughter, ambulates with walker with PMH of recurrent SBO's, s/p exp lap, SB resection in 2015, ex lap, ALBINA in 2018, DVT, PE, on Xarelto, IVC filter, chronic leg swelling, and anasarca, Now send in to the ER by her PCP, Dr. Ross, for evaluation of  generalized weakness and hypotension BP of 80/40 during office visit. On admission, she found to have no fever and BP was in lower normal range and no Leukocytosis. The CXR is clear and CT abd/pelvis consistent with Ileus. The ID consult requested to assist with evaluation of infectious etiology of episode of hypotension. Found to have Bacteroides bacteremia and now developed septic shock on Cefepime and Flagyl. Hence transferred to ICU. 10/20/20.    # Hypotension  at office- BP of 80/40 - resolved   #  Bacteroides fragilis Bacteremia - 10/13/20 - ? source most likely GI- Repeat Blood Cxs have NGTD 10/16/20  # Ileus  - h/o recurrent SBO and s/p EXp. LAp  # COVID 19 negative   # Septic shock ( Hypothermia + hypotensive)- transferred to ICU since requiring pressor- source GI, The CT abd/pelvis shows nonspecific enterocolitis, noninfectious inflammatory bowel disease, or ischemic bowel, along with ileus.   # Pneumonia- HCAP vs Aspiration - on CXR 10/24 and CT chest 10/21- sputum Cx grew Methicillin resistant Staphylococcus aureus  and Stenotrophomonas maltophilia.  # S/p extubated 11/9/20  # Infected sacral ulcer- s/p debridement and culture grew Enterococcus, VRE and Candida, sensitivity is pending- The MRI of pelvis shows  Osteomyelitis of the lower sacrum and coccyx with overlying cutaneous ulceration.   # Fever- 11/8 and 11/19- BCX NGTD 11/19 - The CXR shows Improvement in bilateral airspace disease with persistent bibasilar consolidation. She is s/p removal of velazquez catheter and passed TOV.  # HCAP- 11/27/20,  Intermittent fever and CXR shows  bilateral consolidations right greater than left. - The procalcitonin level is 1.55  # s/p PICC line placement 11/30  # Persistent effusions left greater than right      would recommend:    1 Please Surgery follow up for further debridement of Sacral ulcer since still spiking fever  2. Monitor Temp. and c/w supportive care   3. Continue oral Linezolid  and oral fluconazole until  12/29/20  4. Aspiration precaution  5. Frequent repositioning   6. OOB to chair      d/w Covering NPDomenic    Attending Attestation:    Spent more than 35 minutes on total encounter, more than 50 % of the visit was spent counseling and/or coordinating care by the Attending physician.

## 2020-12-18 NOTE — CHART NOTE - NSCHARTNOTEFT_GEN_A_CORE
Reassessment:   65yFemalePatient is a 65y old  Female who presents with a chief complaint of Hypotension (18 Dec 2020 12:10)  pt visited. reports fair intake of meals/supplements. per flow records past 3 days 25-75% intake. pt denies n/v/d.     Factors impacting intake: [ ] none [ ] nausea  [ ] vomiting [ ] diarrhea [ ] constipation  [ X]chewing problems [X ] swallowing issues  [ ] other:     Diet Prescription: Diet, Dysphagia 1 Pureed-Thin Liquids:   Low Sodium  Supplement Feeding Modality:  Oral  Two Papi HN Cans or Servings Per Day:  2       Frequency:  Two Times a day  Ensure Pudding Cans or Servings Per Day:  2       Frequency:  Two Times a day (12-02-20 @ 11:57)    Intake: fair/variable    Daily   % Weight Change  obtain updated wt. pt was sitting in chair on visit.     Pertinent Medications: MEDICATIONS  (STANDING):  ascorbic acid 500 milliGRAM(s) Oral two times a day  chlorhexidine 2% Cloths 1 Application(s) Topical daily  enoxaparin Injectable 50 milliGRAM(s) SubCutaneous daily  fluconAZOLE   Tablet 200 milliGRAM(s) Oral daily  furosemide    Tablet 20 milliGRAM(s) Oral daily  lactobacillus acidophilus 1 Tablet(s) Oral daily  linezolid    Tablet 600 milliGRAM(s) Oral every 12 hours  multivitamin/minerals 1 Tablet(s) Oral daily  nystatin Powder 1 Application(s) Topical two times a day  pantoprazole    Tablet 40 milliGRAM(s) Oral before breakfast  rifAXIMin 550 milliGRAM(s) Oral two times a day  zinc sulfate 220 milliGRAM(s) Oral daily    MEDICATIONS  (PRN):  acetaminophen   Tablet .. 650 milliGRAM(s) Oral every 6 hours PRN Temp greater or equal to 38C (100.4F), Mild Pain (1 - 3), Moderate Pain (4 - 6)  ALBUTerol    90 MICROgram(s) HFA Inhaler 2 Puff(s) Inhalation every 6 hours PRN Shortness of Breath and/or Wheezing  sodium chloride 0.9% lock flush 10 milliLiter(s) IV Push every 1 hour PRN Pre/post blood products, medications, blood draw, and to maintain line patency    Pertinent Labs: 12-18 Na146 mmol/L<H> Glu 77 mg/dL K+ 3.8 mmol/L Cr  0.68 mg/dL BUN 14 mg/dL 12-17 Phos 3.3 mg/dL 12-17 Alb 1.8 g/dL<L>     CAPILLARY BLOOD GLUCOSE      POCT Blood Glucose.: 90 mg/dL (18 Dec 2020 06:00)  POCT Blood Glucose.: 85 mg/dL (17 Dec 2020 23:55)    Skin: unstageable to sacrum    Estimated Needs:   [ X] no change since previous assessment  [ ] recalculated:     Previous Nutrition Diagnosis:   [ ] Inadequate Energy Intake [ ]Inadequate Oral Intake [ ] Excessive Energy Intake   [ ] Underweight [ ] Increased Nutrient Needs [ ] Overweight/Obesity   [ ] Altered GI Function [ ] Unintended Weight Loss [ ] Food & Nutrition Related Knowledge Deficit [ X] Malnutrition-severe    Nutrition Diagnosis is [ X] ongoing  [ ] resolved [ ] not applicable     New Nutrition Diagnosis: [ ] not applicable       Interventions:   Recommend  [ ] Change Diet To:  [ ] Nutrition Supplement  [ ] Nutrition Support  [X ] Other: continue with present diet order/supplements as tolerated/feasible. Nursing to continue with feeding assistance/encouragement. Continue with vit/min supplementation.     Monitoring and Evaluation:   [ X] PO intake [ x ] Tolerance to diet prescription [ x ] weights [ x ] labs[ x ] follow up per protocol  [ ] other:

## 2020-12-18 NOTE — PROGRESS NOTE ADULT - PROBLEM SELECTOR PLAN 1
Bacteroides fragilis per BC on 10/13  Sputum positive for MRSA and Stenotrophomonas  Aspiration pneumonia vs HCAP .   Repeat BC 10/16 and 11/19 NGTD ,UC on 11/20 NGTD   Infected sacral ulcer- s/p debridement and culture grew Enterococcus (VRE) and Candida  Leukocytosis resolved, Cdiff negative  MRI sacrum/pelvis shows Osteomyelitis  c/w abx per ID Dapto until 12/28.  linezolid 600mg  q12h D4/14. Fluconazole 200mg daily until 12/29. Bacteroides fragilis per BC on 10/13  Sputum positive for MRSA and Stenotrophomonas  Aspiration pneumonia vs HCAP .   Repeat BC 10/16 and 11/19 NGTD ,UC on 11/20 NGTD   Infected sacral ulcer- s/p debridement and culture grew Enterococcus (VRE) and Candida  Leukocytosis resolved, Cdiff negative  MRI sacrum/pelvis shows Osteomyelitis  c/w abx per ID. Linezolid 600mg  q12h D5/14. Fluconazole 200mg daily until 12/29.

## 2020-12-18 NOTE — PROGRESS NOTE ADULT - SUBJECTIVE AND OBJECTIVE BOX
Patient was seen and examined  Patient is a 65y old  Female who presents with a chief complaint of Hypotension (17 Dec 2020 18:44)      INTERVAL HPI/OVERNIGHT EVENTS:  T(C): 37.7 (12-18-20 @ 05:53), Max: 37.7 (12-18-20 @ 05:53)  HR: 98 (12-18-20 @ 05:53) (94 - 98)  BP: 115/69 (12-18-20 @ 05:53) (110/72 - 115/69)  RR: 18 (12-18-20 @ 05:53) (18 - 18)  SpO2: 98% (12-18-20 @ 05:53) (98% - 99%)  Wt(kg): --  I&O's Summary      LABS:                        9.0    8.52  )-----------( 330      ( 18 Dec 2020 07:05 )             30.2     12-18    146<H>  |  106  |  14  ----------------------------<  77  3.8   |  33<H>  |  0.68    Ca    8.9      18 Dec 2020 07:05  Phos  3.3     12-17  Mg     2.0     12-17    TPro  6.8  /  Alb  1.8<L>  /  TBili  1.2  /  DBili  x   /  AST  39  /  ALT  26  /  AlkPhos  276<H>  12-17        CAPILLARY BLOOD GLUCOSE      POCT Blood Glucose.: 90 mg/dL (18 Dec 2020 06:00)  POCT Blood Glucose.: 85 mg/dL (17 Dec 2020 23:55)              MEDICATIONS  (STANDING):  ascorbic acid 500 milliGRAM(s) Oral two times a day  chlorhexidine 2% Cloths 1 Application(s) Topical daily  enoxaparin Injectable 50 milliGRAM(s) SubCutaneous daily  fluconAZOLE   Tablet 200 milliGRAM(s) Oral daily  furosemide    Tablet 20 milliGRAM(s) Oral daily  lactobacillus acidophilus 1 Tablet(s) Oral daily  linezolid    Tablet 600 milliGRAM(s) Oral every 12 hours  multivitamin/minerals 1 Tablet(s) Oral daily  nystatin Powder 1 Application(s) Topical two times a day  pantoprazole    Tablet 40 milliGRAM(s) Oral before breakfast  rifAXIMin 550 milliGRAM(s) Oral two times a day  zinc sulfate 220 milliGRAM(s) Oral daily    MEDICATIONS  (PRN):  acetaminophen   Tablet .. 650 milliGRAM(s) Oral every 6 hours PRN Temp greater or equal to 38C (100.4F), Mild Pain (1 - 3), Moderate Pain (4 - 6)  ALBUTerol    90 MICROgram(s) HFA Inhaler 2 Puff(s) Inhalation every 6 hours PRN Shortness of Breath and/or Wheezing  sodium chloride 0.9% lock flush 10 milliLiter(s) IV Push every 1 hour PRN Pre/post blood products, medications, blood draw, and to maintain line patency      RADIOLOGY & ADDITIONAL TESTS:    Imaging Personally Reviewed:  [ ] YES  [ ] NO    REVIEW OF SYSTEMS:  CONSTITUTIONAL: No fever, weight loss, or fatigue  EYES: No eye pain, visual disturbances, or discharge  ENMT:  No difficulty hearing, tinnitus, vertigo; No sinus or throat pain  NECK: No pain or stiffness  BREASTS: No pain, masses, or nipple discharge  RESPIRATORY: No cough, wheezing, chills or hemoptysis; No shortness of breath  CARDIOVASCULAR: No chest pain, palpitations, dizziness, or leg swelling  GASTROINTESTINAL: No abdominal or epigastric pain. No nausea, vomiting, or hematemesis; No diarrhea or constipation. No melena or hematochezia.  GENITOURINARY: No dysuria, frequency, hematuria, or incontinence  NEUROLOGICAL: No headaches, memory loss, loss of strength, numbness, or tremors  SKIN: No itching, burning, rashes, or lesions   LYMPH NODES: No enlarged glands  ENDOCRINE: No heat or cold intolerance; No hair loss  MUSCULOSKELETAL: No joint pain or swelling; No muscle, back, or extremity pain  PSYCHIATRIC: No depression, anxiety, mood swings, or difficulty sleeping  HEME/LYMPH: No easy bruising, or bleeding gums  ALLERY AND IMMUNOLOGIC: No hives or eczema      Consultant(s) Notes Reviewed:  [ x ] YES  [ ] NO    PHYSICAL EXAM:  GENERAL: NAD, well-groomed, well-developed  HEAD:  Atraumatic, Normocephalic  EYES: EOMI, PERRLA, conjunctiva and sclera clear  ENMT: No tonsillar erythema, exudates, or enlargement; Moist mucous membranes, Good dentition, No lesions  NECK: Supple, No JVD, Normal thyroid  NERVOUS SYSTEM:  Alert & Oriented X3, Good concentration; Motor Strength 5/5 B/L upper and lower extremities; DTRs 2+ intact and symmetric  CHEST/LUNG: Clear to percussion bilaterally; No rales, rhonchi, wheezing, or rubs  HEART: Regular rate and rhythm; No murmurs, rubs, or gallops  ABDOMEN: Soft, Nontender, Nondistended; Bowel sounds present  EXTREMITIES:  2+ Peripheral Pulses, No clubbing, cyanosis, or edema  LYMPH: No lymphadenopathy noted  SKIN: No rashes or lesions    Care Discussed with Consultants/Other Providers [ x] YES  [ ] NO

## 2020-12-19 NOTE — PROGRESS NOTE ADULT - PROBLEM SELECTOR PLAN 4
Continue wound care.  turn every 2 hours  Continue antibiotics as per Dr Patricio. Needs antibiotics thru 12/29.   OOB daily.  NEEDS DEBRIDEMENT SURGICAL FOLLOW UP  [facility]

## 2020-12-19 NOTE — PROGRESS NOTE ADULT - SUBJECTIVE AND OBJECTIVE BOX
Patient is seen and examined at the bed side,  remains afebrile.  The WBC count and creatinine stay normal.       REVIEW OF SYSTEMS: All other review systems are negative      ALLERGIES: No Known Allergies      Vital Signs Last 24 Hrs  T(C): 36.7 (19 Dec 2020 13:10), Max: 37.2 (19 Dec 2020 04:36)  T(F): 98.1 (19 Dec 2020 13:10), Max: 99 (19 Dec 2020 04:36)  HR: 98 (19 Dec 2020 13:10) (98 - 106)  BP: 103/59 (19 Dec 2020 13:10) (103/59 - 114/72)  BP(mean): --  RR: 18 (19 Dec 2020 13:10) (16 - 18)  SpO2: 98% (19 Dec 2020 13:10) (98% - 99%)      PHYSICAL EXAM:  GENERAL: Not in distress, off oxygen   CHEST/LUNG: Not using accessory muscles   HEART: s1 and s2 present  ABDOMEN:  Nontender and  Nondistended  EXTREMITIES: B/L UE edematous improved  CNS: Awake and Alert         LABS:                        7.4    9.46  )-----------( 333      ( 19 Dec 2020 07:24 )             24.8                9.4    13.14 )-----------( 149      ( 14 Nov 2020 07:58 )             28.7       12-19    145  |  107  |  13  ----------------------------<  87  4.0   |  32<H>  |  0.74    Ca    8.5      19 Dec 2020 07:24    TPro  6.8  /  Alb  1.8<L>  /  TBili  1.2  /  DBili  x   /  AST  39  /  ALT  26  /  AlkPhos  276<H>  12-17    11-06    138  |  104  |  37<H>  ----------------------------<  81  3.9   |  25  |  2.37<H>    Ca    8.1<L>      06 Nov 2020 06:20  Phos  3.4     11-06  Mg     2.0     11-06    TPro  5.1<L>  /  Alb  2.8<L>  /  TBili  9.2<H>  /  DBili  x   /  AST  233<H>  /  ALT  99<H>  /  AlkPhos  96  11-06      Procalcitonin, Serum (12.16.20 @ 19:11)   Procalcitonin, Serum: 0.98:     Vancomycin Level, Trough (11.07.20 @ 21:49)   Vancomycin Level, Trough: 16.1:     Vancomycin Level, Trough (11.07.20 @ 07:08) : 19.5  Vancomycin Level, Trough (11.06.20 @ 06:20) : 19.9:   Procalcitonin, Serum (11.28.20 @ 15:38) : 1.55:       MEDICATIONS  (STANDING):    ascorbic acid 500 milliGRAM(s) Oral two times a day  chlorhexidine 2% Cloths 1 Application(s) Topical daily  collagenase Ointment 1 Application(s) Topical daily  enoxaparin Injectable 60 milliGRAM(s) SubCutaneous two times a day  fluconAZOLE   Tablet 200 milliGRAM(s) Oral daily  furosemide    Tablet 20 milliGRAM(s) Oral daily  lactobacillus acidophilus 1 Tablet(s) Oral daily  linezolid    Tablet 600 milliGRAM(s) Oral every 12 hours  multivitamin/minerals 1 Tablet(s) Oral daily  nystatin Powder 1 Application(s) Topical two times a day  pantoprazole    Tablet 40 milliGRAM(s) Oral before breakfast  rifAXIMin 550 milliGRAM(s) Oral two times a day  zinc sulfate 220 milliGRAM(s) Oral daily        RADIOLOGY & ADDITIONAL TESTS:    12/9/20 : Xray Chest 1 View- PORTABLE-Routine (Xray Chest 1 View- PORTABLE-Routine in AM.) (12.09.20 @ 11:54) >  IMPRESSION: Persistent effusions left greater than right.      11/27/20 : Xray Chest 1 View- PORTABLE-Urgent (Xray Chest 1 View- PORTABLE-Urgent .) (11.27.20 @ 06:53) Increased interstitial lung markings with bilateral consolidations right greater than left. Small right pleural effusion. Overall worsening compared to prior exam.      11/24/20 : MR Pelvis Bony Only No Cont (11.24.20 @ 18:02) : Osteomyelitis of the lower sacrum and coccyx with overlying cutaneous ulceration,      11/19/20 : Xray Chest 1 View- PORTABLE-Urgent (Xray Chest 1 View- PORTABLE-Urgent .) (11.19.20 @ 23:53): Improvement in bilateral airspace disease with persistent bibasilar consolidation and small effusions.    11/4/20 : Xray Chest 1 View- PORTABLE-Routine (Xray Chest 1 View- PORTABLE-Routine in AM.) (11.04.20 @ 10:59) Reexpansion of the left lung with residual left pleural effusion and/or infiltrate.  Right pulmonary edema unchanged.  Tubes and catheters in satisfactory position.      10/25/20: Xray Chest 1 View- PORTABLE-Routine (Xray Chest 1 View- PORTABLE-Routine in AM.) (10.25.20 @ 09:21) Frontal expiratory view of the chest shows the heart to be similar in size. Endotracheal tube, right jugular line and feeding tube remain present. The lungs show similar left upper lobe infiltrate with progression of right perihilar infiltrate. Pleural effusions are similar. There is no evidence of pneumothorax.    10/21/20 : CT Abdomen and Pelvis w/ Oral Cont and w/ IV Cont (10.21.20 @ 15:59) Mural thickening of the left colon and rectum. Liquid stool in the colon. Apparent segmental mural thickening of the mid to distal small bowel and the proximal duodenum. Findings may represent nonspecific enterocolitis, noninfectious inflammatory bowel disease, or ischemic bowel. Clinical correlation is recommended. The celiac axis artery, SMA, and KINA are patent without stenosis.    Dilatation of the mid small bowel is again noted. Oral contrast has reached the terminal ileum. Findings may represent small bowel obstruction, or ileus related to nonspecific enterocolitis.   Cholelithiasis. Moderate to large ascites in the abdomen, increased since the previous examination. 5 mm nonspecific noncalcified left upper lobe lung nodule; if the patient's is in the high risk category (i.e. smoker), follow-up chest CT may be pursued in 12 months to ensure stability. Combination of atelectasis and consolidation in the left lower lobe.  Possible 1.0 cm hypodense lesion in the left lobe of the thyroid. Thyroid ultrasound may be pursued for further evaluation.   Mild bilateral pleural effusions.  Aging determinate compression fracture at T5 vertebra.        MICROBIOLOGY DATA:  MRSA/MSSA PCR (12.01.20 @ 01:16)   MRSA PCR Result.: Detected:    MRSA/MSSA PCR (11.28.20 @ 11:34)   MRSA PCR Result.: Detected:     Urine Microscopic-Add On (NC) (11.20.20 @ 04:12)   Red Blood Cell - Urine: 5-10 /HPF   White Blood Cell - Urine: 11-25 /HPF   Calcium Oxalate Crystals: Few /HPF   Bacteria: Moderate /HPF   Comment - Urine: few amorphous urates   Epithelial Cells: Few /HPF     Culture - Blood (11.19.20 @ 10:19)   Specimen Source: .Blood Blood-Peripheral   Culture Results: No growth to date.     Culture - Surgical Swab (11.12.20 @ 05:01)   - Ampicillin: R >8 Predicts results to ampicillin/sulbactam, amoxacillin-clavulanate and piperacillin-tazobactam.   - Levofloxacin: R >4   - Linezolid: S 1   - Tetra/Doxy: R >8   - Vancomycin: R >16   Specimen Source: .Surgical Swab Sacral decub   Culture Results:   Few Enterococcus faecium (vancomycin resistant)   Rare Candida albicans "Susceptibilities not performed"   Organism Identification: Enterococcus faecium (vancomycin resistant)   Organism: Enterococcus faecium (vancomycin resistant)   Method Type: STEWART     Culture - Surgical Swab (11.12.20 @ 05:01)   Specimen Source: .Surgical Swab Sacral decub   Culture Results:  Few Enterococcus faecium Susceptibility to follow.   Rare Candida albicans "Susceptibilities not performed"     Culture - Sputum . (10.26.20 @ 00:26)   - Ampicillin/Sulbactam: R <=8/4   - Cefazolin: R >16   - Ceftazidime: I 16   - Clindamycin: R >4   - Erythromycin: R >4   - Gentamicin: S <=1 Should not be used as monotherapy   - Levofloxacin: S <=0.5   - Linezolid: S 2   - Oxacillin: R >2   - Penicillin: R >8   - RIF- Rifampin: S <=1 Should not be used as monotherapy   - Tetra/Doxy: S <=1   - Trimethoprim/Sulfamethoxazole: S <=0.5/9.5   - Trimethoprim/Sulfamethoxazole: S <=0.5/9.5   - Vancomycin: S 1     MRSA/MSSA PCR (10.21.20 @ 09:41)   MRSA PCR Result.: Detected:       Culture - Blood (10.20.20 @ 22:09)   Specimen Source: .Blood Blood   Culture Results:  No growth to date.     Culture - Blood (10.20.20 @ 22:09)   Specimen Source: .Blood Blood   Culture Results:   No growth to date.     Culture - Blood in AM (10.16.20 @ 10:13)   Specimen Source: .Blood Blood-Peripheral   Culture Results: No growth to date.     Culture - Blood in AM (10.16.20 @ 10:13)   Specimen Source: .Blood Blood-Peripheral   Culture Results: No growth to date.     Culture - Blood (10.13.20 @ 22:23)   Growth in anaerobic bottle: Gram Negative Rods   Specimen Source: .Blood Blood-Peripheral   Organism: Blood Culture PCR   Culture Results: Growth in anaerobic bottle: Bacteroides fragilis   "Susceptibilities not performed"     Culture - Blood (10.13.20 @ 22:23)   Specimen Source: .Blood Blood-Peripheral   Culture Results:   No growth to date.          Patient is seen and examined at the bed side,  remains afebrile.  The Surgery recommendation noted.      REVIEW OF SYSTEMS: All other review systems are negative      ALLERGIES: No Known Allergies      Vital Signs Last 24 Hrs  T(C): 36.7 (19 Dec 2020 13:10), Max: 37.2 (19 Dec 2020 04:36)  T(F): 98.1 (19 Dec 2020 13:10), Max: 99 (19 Dec 2020 04:36)  HR: 98 (19 Dec 2020 13:10) (98 - 106)  BP: 103/59 (19 Dec 2020 13:10) (103/59 - 114/72)  BP(mean): --  RR: 18 (19 Dec 2020 13:10) (16 - 18)  SpO2: 98% (19 Dec 2020 13:10) (98% - 99%)      PHYSICAL EXAM:  GENERAL: Not in distress, off oxygen   CHEST/LUNG: Not using accessory muscles   HEART: s1 and s2 present  ABDOMEN:  Nontender and  Nondistended  EXTREMITIES: B/L UE edematous improved  CNS: Awake and Alert         LABS:                        7.4    9.46  )-----------( 333      ( 19 Dec 2020 07:24 )             24.8                9.4    13.14 )-----------( 149      ( 14 Nov 2020 07:58 )             28.7       12-19    145  |  107  |  13  ----------------------------<  87  4.0   |  32<H>  |  0.74    Ca    8.5      19 Dec 2020 07:24    TPro  6.8  /  Alb  1.8<L>  /  TBili  1.2  /  DBili  x   /  AST  39  /  ALT  26  /  AlkPhos  276<H>  12-17    11-06    138  |  104  |  37<H>  ----------------------------<  81  3.9   |  25  |  2.37<H>    Ca    8.1<L>      06 Nov 2020 06:20  Phos  3.4     11-06  Mg     2.0     11-06    TPro  5.1<L>  /  Alb  2.8<L>  /  TBili  9.2<H>  /  DBili  x   /  AST  233<H>  /  ALT  99<H>  /  AlkPhos  96  11-06      Procalcitonin, Serum (12.16.20 @ 19:11)   Procalcitonin, Serum: 0.98:     Vancomycin Level, Trough (11.07.20 @ 21:49)   Vancomycin Level, Trough: 16.1:     Vancomycin Level, Trough (11.07.20 @ 07:08) : 19.5  Vancomycin Level, Trough (11.06.20 @ 06:20) : 19.9:   Procalcitonin, Serum (11.28.20 @ 15:38) : 1.55:       MEDICATIONS  (STANDING):    ascorbic acid 500 milliGRAM(s) Oral two times a day  chlorhexidine 2% Cloths 1 Application(s) Topical daily  collagenase Ointment 1 Application(s) Topical daily  enoxaparin Injectable 60 milliGRAM(s) SubCutaneous two times a day  fluconAZOLE   Tablet 200 milliGRAM(s) Oral daily  furosemide    Tablet 20 milliGRAM(s) Oral daily  lactobacillus acidophilus 1 Tablet(s) Oral daily  linezolid    Tablet 600 milliGRAM(s) Oral every 12 hours  multivitamin/minerals 1 Tablet(s) Oral daily  nystatin Powder 1 Application(s) Topical two times a day  pantoprazole    Tablet 40 milliGRAM(s) Oral before breakfast  rifAXIMin 550 milliGRAM(s) Oral two times a day  zinc sulfate 220 milliGRAM(s) Oral daily        RADIOLOGY & ADDITIONAL TESTS:    12/9/20 : Xray Chest 1 View- PORTABLE-Routine (Xray Chest 1 View- PORTABLE-Routine in AM.) (12.09.20 @ 11:54) >  IMPRESSION: Persistent effusions left greater than right.      11/27/20 : Xray Chest 1 View- PORTABLE-Urgent (Xray Chest 1 View- PORTABLE-Urgent .) (11.27.20 @ 06:53) Increased interstitial lung markings with bilateral consolidations right greater than left. Small right pleural effusion. Overall worsening compared to prior exam.      11/24/20 : MR Pelvis Bony Only No Cont (11.24.20 @ 18:02) : Osteomyelitis of the lower sacrum and coccyx with overlying cutaneous ulceration,      11/19/20 : Xray Chest 1 View- PORTABLE-Urgent (Xray Chest 1 View- PORTABLE-Urgent .) (11.19.20 @ 23:53): Improvement in bilateral airspace disease with persistent bibasilar consolidation and small effusions.    11/4/20 : Xray Chest 1 View- PORTABLE-Routine (Xray Chest 1 View- PORTABLE-Routine in AM.) (11.04.20 @ 10:59) Reexpansion of the left lung with residual left pleural effusion and/or infiltrate.  Right pulmonary edema unchanged.  Tubes and catheters in satisfactory position.      10/25/20: Xray Chest 1 View- PORTABLE-Routine (Xray Chest 1 View- PORTABLE-Routine in AM.) (10.25.20 @ 09:21) Frontal expiratory view of the chest shows the heart to be similar in size. Endotracheal tube, right jugular line and feeding tube remain present. The lungs show similar left upper lobe infiltrate with progression of right perihilar infiltrate. Pleural effusions are similar. There is no evidence of pneumothorax.    10/21/20 : CT Abdomen and Pelvis w/ Oral Cont and w/ IV Cont (10.21.20 @ 15:59) Mural thickening of the left colon and rectum. Liquid stool in the colon. Apparent segmental mural thickening of the mid to distal small bowel and the proximal duodenum. Findings may represent nonspecific enterocolitis, noninfectious inflammatory bowel disease, or ischemic bowel. Clinical correlation is recommended. The celiac axis artery, SMA, and KINA are patent without stenosis.    Dilatation of the mid small bowel is again noted. Oral contrast has reached the terminal ileum. Findings may represent small bowel obstruction, or ileus related to nonspecific enterocolitis.   Cholelithiasis. Moderate to large ascites in the abdomen, increased since the previous examination. 5 mm nonspecific noncalcified left upper lobe lung nodule; if the patient's is in the high risk category (i.e. smoker), follow-up chest CT may be pursued in 12 months to ensure stability. Combination of atelectasis and consolidation in the left lower lobe.  Possible 1.0 cm hypodense lesion in the left lobe of the thyroid. Thyroid ultrasound may be pursued for further evaluation.   Mild bilateral pleural effusions.  Aging determinate compression fracture at T5 vertebra.        MICROBIOLOGY DATA:  MRSA/MSSA PCR (12.01.20 @ 01:16)   MRSA PCR Result.: Detected:    MRSA/MSSA PCR (11.28.20 @ 11:34)   MRSA PCR Result.: Detected:     Urine Microscopic-Add On (NC) (11.20.20 @ 04:12)   Red Blood Cell - Urine: 5-10 /HPF   White Blood Cell - Urine: 11-25 /HPF   Calcium Oxalate Crystals: Few /HPF   Bacteria: Moderate /HPF   Comment - Urine: few amorphous urates   Epithelial Cells: Few /HPF     Culture - Blood (11.19.20 @ 10:19)   Specimen Source: .Blood Blood-Peripheral   Culture Results: No growth to date.     Culture - Surgical Swab (11.12.20 @ 05:01)   - Ampicillin: R >8 Predicts results to ampicillin/sulbactam, amoxacillin-clavulanate and piperacillin-tazobactam.   - Levofloxacin: R >4   - Linezolid: S 1   - Tetra/Doxy: R >8   - Vancomycin: R >16   Specimen Source: .Surgical Swab Sacral decub   Culture Results:   Few Enterococcus faecium (vancomycin resistant)   Rare Candida albicans "Susceptibilities not performed"   Organism Identification: Enterococcus faecium (vancomycin resistant)   Organism: Enterococcus faecium (vancomycin resistant)   Method Type: STEWART     Culture - Surgical Swab (11.12.20 @ 05:01)   Specimen Source: .Surgical Swab Sacral decub   Culture Results:  Few Enterococcus faecium Susceptibility to follow.   Rare Candida albicans "Susceptibilities not performed"     Culture - Sputum . (10.26.20 @ 00:26)   - Ampicillin/Sulbactam: R <=8/4   - Cefazolin: R >16   - Ceftazidime: I 16   - Clindamycin: R >4   - Erythromycin: R >4   - Gentamicin: S <=1 Should not be used as monotherapy   - Levofloxacin: S <=0.5   - Linezolid: S 2   - Oxacillin: R >2   - Penicillin: R >8   - RIF- Rifampin: S <=1 Should not be used as monotherapy   - Tetra/Doxy: S <=1   - Trimethoprim/Sulfamethoxazole: S <=0.5/9.5   - Trimethoprim/Sulfamethoxazole: S <=0.5/9.5   - Vancomycin: S 1     MRSA/MSSA PCR (10.21.20 @ 09:41)   MRSA PCR Result.: Detected:       Culture - Blood (10.20.20 @ 22:09)   Specimen Source: .Blood Blood   Culture Results:  No growth to date.     Culture - Blood (10.20.20 @ 22:09)   Specimen Source: .Blood Blood   Culture Results:   No growth to date.     Culture - Blood in AM (10.16.20 @ 10:13)   Specimen Source: .Blood Blood-Peripheral   Culture Results: No growth to date.     Culture - Blood in AM (10.16.20 @ 10:13)   Specimen Source: .Blood Blood-Peripheral   Culture Results: No growth to date.     Culture - Blood (10.13.20 @ 22:23)   Growth in anaerobic bottle: Gram Negative Rods   Specimen Source: .Blood Blood-Peripheral   Organism: Blood Culture PCR   Culture Results: Growth in anaerobic bottle: Bacteroides fragilis   "Susceptibilities not performed"     Culture - Blood (10.13.20 @ 22:23)   Specimen Source: .Blood Blood-Peripheral   Culture Results:   No growth to date.

## 2020-12-19 NOTE — PROGRESS NOTE ADULT - SUBJECTIVE AND OBJECTIVE BOX
Patient was seen and examined  Patient is a 65y old  Female who presents with a chief complaint of Hypotension (18 Dec 2020 17:47)      INTERVAL HPI/OVERNIGHT EVENTS:  T(C): 37.2 (12-19-20 @ 04:36), Max: 37.6 (12-18-20 @ 11:52)  HR: 106 (12-19-20 @ 04:36) (105 - 106)  BP: 114/72 (12-19-20 @ 04:36) (106/65 - 114/72)  RR: 16 (12-19-20 @ 04:36) (16 - 18)  SpO2: 98% (12-19-20 @ 04:36) (98% - 99%)  Wt(kg): --  I&O's Summary      LABS:                        7.4    9.46  )-----------( 333      ( 19 Dec 2020 07:24 )             24.8     12-19    145  |  107  |  13  ----------------------------<  87  4.0   |  32<H>  |  0.74    Ca    8.5      19 Dec 2020 07:24          CAPILLARY BLOOD GLUCOSE                  MEDICATIONS  (STANDING):  ascorbic acid 500 milliGRAM(s) Oral two times a day  chlorhexidine 2% Cloths 1 Application(s) Topical daily  enoxaparin Injectable 50 milliGRAM(s) SubCutaneous daily  fluconAZOLE   Tablet 200 milliGRAM(s) Oral daily  furosemide    Tablet 20 milliGRAM(s) Oral daily  lactobacillus acidophilus 1 Tablet(s) Oral daily  linezolid    Tablet 600 milliGRAM(s) Oral every 12 hours  multivitamin/minerals 1 Tablet(s) Oral daily  nystatin Powder 1 Application(s) Topical two times a day  pantoprazole    Tablet 40 milliGRAM(s) Oral before breakfast  rifAXIMin 550 milliGRAM(s) Oral two times a day  zinc sulfate 220 milliGRAM(s) Oral daily    MEDICATIONS  (PRN):  acetaminophen   Tablet .. 650 milliGRAM(s) Oral every 6 hours PRN Temp greater or equal to 38C (100.4F), Mild Pain (1 - 3), Moderate Pain (4 - 6)  ALBUTerol    90 MICROgram(s) HFA Inhaler 2 Puff(s) Inhalation every 6 hours PRN Shortness of Breath and/or Wheezing  sodium chloride 0.9% lock flush 10 milliLiter(s) IV Push every 1 hour PRN Pre/post blood products, medications, blood draw, and to maintain line patency      RADIOLOGY & ADDITIONAL TESTS:    Imaging Personally Reviewed:  [ ] YES  [ ] NO    REVIEW OF SYSTEMS:  CONSTITUTIONAL: No fever, weight loss, or fatigue  EYES: No eye pain, visual disturbances, or discharge  ENMT:  No difficulty hearing, tinnitus, vertigo; No sinus or throat pain  NECK: No pain or stiffness  BREASTS: No pain, masses, or nipple discharge  RESPIRATORY: No cough, wheezing, chills or hemoptysis; No shortness of breath  CARDIOVASCULAR: No chest pain, palpitations, dizziness, or leg swelling  GASTROINTESTINAL: No abdominal or epigastric pain. No nausea, vomiting, or hematemesis; No diarrhea or constipation. No melena or hematochezia.  GENITOURINARY: No dysuria, frequency, hematuria, or incontinence  NEUROLOGICAL: No headaches, memory loss, loss of strength, numbness, or tremors  SKIN: No itching, burning, rashes, or lesions   LYMPH NODES: No enlarged glands  ENDOCRINE: No heat or cold intolerance; No hair loss  MUSCULOSKELETAL: No joint pain or swelling; No muscle, back, or extremity pain  PSYCHIATRIC: No depression, anxiety, mood swings, or difficulty sleeping  HEME/LYMPH: No easy bruising, or bleeding gums  ALLERY AND IMMUNOLOGIC: No hives or eczema      Consultant(s) Notes Reviewed:  [ x ] YES  [ ] NO    PHYSICAL EXAM:  GENERAL: NAD, well-groomed, well-developed  HEAD:  Atraumatic, Normocephalic  EYES: EOMI, PERRLA, conjunctiva and sclera clear  ENMT: No tonsillar erythema, exudates, or enlargement; Moist mucous membranes, Good dentition, No lesions  NECK: Supple, No JVD, Normal thyroid  NERVOUS SYSTEM:  Alert & Oriented X3, Good concentration; Motor Strength 5/5 B/L upper and lower extremities; DTRs 2+ intact and symmetric  CHEST/LUNG: Clear to percussion bilaterally; No rales, rhonchi, wheezing, or rubs  HEART: Regular rate and rhythm; No murmurs, rubs, or gallops  ABDOMEN: Soft, Nontender, Nondistended; Bowel sounds present  EXTREMITIES:  2+ Peripheral Pulses, No clubbing, cyanosis, or edema  LYMPH: No lymphadenopathy noted  SKIN: sacral decubiti    Care Discussed with Consultants/Other Providers [ x] YES  [ ] NO

## 2020-12-19 NOTE — PROGRESS NOTE ADULT - ASSESSMENT
65 year old female s/p surgical debridement of sacral decubitus in November, being treated for osteomyelitis. Now with fevers    - Sacral decubitus appears clean   - may apply collagenase to areas with slough  - continue foam dressings  - frequent offloading and repositioning  - continue medical management  - ID follow up   - no further surgical intervention at this time  - discussed with Dr. Cottrell

## 2020-12-19 NOTE — PROGRESS NOTE ADULT - PROBLEM SELECTOR PLAN 1
Bacteroides fragilis per BC on 10/13  Sputum positive for MRSA and Stenotrophomonas  Aspiration pneumonia vs HCAP .   Repeat BC 10/16 and 11/19 NGTD ,UC on 11/20 NGTD   Infected sacral ulcer- s/p debridement and culture grew Enterococcus (VRE) and Candida  Leukocytosis resolved, Cdiff negative  MRI sacrum/pelvis shows Osteomyelitis  c/w abx per ID. Linezolid 600mg  q12h D5/14. Fluconazole 200mg daily until 12/29.

## 2020-12-19 NOTE — PROGRESS NOTE ADULT - PROBLEM SELECTOR PLAN 5
of chronic disease, H&H unchanged  Continue to follow.  H&H noted- no over sign of bleeding.    Follow AM CBC.

## 2020-12-19 NOTE — PROGRESS NOTE ADULT - SUBJECTIVE AND OBJECTIVE BOX
MARIBETH PADILLA    SCU progress note    INTERVAL HPI/OVERNIGHT EVENTS: No acute event     DNR [X]   DNI  [  ]    Covid - 19 PCR:     The 4Ms    What Matters Most: see GOC  Age appropriate Medications/Screen for High Risk Medication: Yes  Mentation: see CAM below  Mobility: defer to physical exam    The Confusion Assessment Method (CAM) Diagnostic Algorithm     1: Acute Onset or Fluctuating Course  - Is there evidence of an acute change in mental status from the patient’s baseline? Did the (abnormal) behavior  fluctuate during the day, that is, tend to come and go, or increase and decrease in severity?  [ ] YES [X ] NO     2: Inattention  - Did the patient have difficulty focusing attention, being easily distractible, or having difficulty keeping track of what was being said?  [ ] YES [x ] NO     3: Disorganized thinking  -Was the patient’s thinking disorganized or incoherent, such as rambling or irrelevant conversation, unclear or illogical flow of ideas, or unpredictable switching from subject to subject?  [ ] YES [ X] NO    4: Altered Level of consciousness?  [ ] YES [X ] NO    The diagnosis of delirium by CAM requires the presence of features 1 and 2 and either 3 or 4.    PRESSORS: [ ] YES [ X] NO  fluconAZOLE   Tablet 200 milliGRAM(s) Oral daily  linezolid    Tablet 600 milliGRAM(s) Oral every 12 hours  rifAXIMin 550 milliGRAM(s) Oral two times a day    Cardiovascular:  Heart Failure  Acute   Acute on Chronic  Chronic       furosemide    Tablet 20 milliGRAM(s) Oral daily    Pulmonary:  ALBUTerol    90 MICROgram(s) HFA Inhaler 2 Puff(s) Inhalation every 6 hours PRN    Hematalogic:  enoxaparin Injectable 60 milliGRAM(s) SubCutaneous two times a day    Other:  acetaminophen   Tablet .. 650 milliGRAM(s) Oral every 6 hours PRN  ascorbic acid 500 milliGRAM(s) Oral two times a day  chlorhexidine 2% Cloths 1 Application(s) Topical daily  collagenase Ointment 1 Application(s) Topical daily  lactobacillus acidophilus 1 Tablet(s) Oral daily  multivitamin/minerals 1 Tablet(s) Oral daily  nystatin Powder 1 Application(s) Topical two times a day  pantoprazole    Tablet 40 milliGRAM(s) Oral before breakfast  sodium chloride 0.9% lock flush 10 milliLiter(s) IV Push every 1 hour PRN  zinc sulfate 220 milliGRAM(s) Oral daily    acetaminophen   Tablet .. 650 milliGRAM(s) Oral every 6 hours PRN  ALBUTerol    90 MICROgram(s) HFA Inhaler 2 Puff(s) Inhalation every 6 hours PRN  ascorbic acid 500 milliGRAM(s) Oral two times a day  chlorhexidine 2% Cloths 1 Application(s) Topical daily  collagenase Ointment 1 Application(s) Topical daily  enoxaparin Injectable 60 milliGRAM(s) SubCutaneous two times a day  fluconAZOLE   Tablet 200 milliGRAM(s) Oral daily  furosemide    Tablet 20 milliGRAM(s) Oral daily  lactobacillus acidophilus 1 Tablet(s) Oral daily  linezolid    Tablet 600 milliGRAM(s) Oral every 12 hours  multivitamin/minerals 1 Tablet(s) Oral daily  nystatin Powder 1 Application(s) Topical two times a day  pantoprazole    Tablet 40 milliGRAM(s) Oral before breakfast  rifAXIMin 550 milliGRAM(s) Oral two times a day  sodium chloride 0.9% lock flush 10 milliLiter(s) IV Push every 1 hour PRN  zinc sulfate 220 milliGRAM(s) Oral daily    Drug Dosing Weight  Height (cm): 167.6 (21 Oct 2020 02:26)  Weight (kg): 56.2 (21 Oct 2020 02:26)  BMI (kg/m2): 20 (21 Oct 2020 02:26)  BSA (m2): 1.63 (21 Oct 2020 02:26)    CENTRAL LINE: [ ] YES [X] NO  LOCATION:   DATE INSERTED:  REMOVE: [ ] YES [ ] NO  EXPLAIN:    TREJO: [ ] YES [X ] NO    DATE INSERTED:  REMOVE:  [ ] YES [ ] NO  EXPLAIN:    PAST MEDICAL & SURGICAL HISTORY:  Anasarca    Pulmonary embolism    DVT (deep venous thrombosis)    SBO (small bowel obstruction)    H/O exploratory laparotomy                      PHYSICAL EXAM:    GENERAL: NAD, well-groomed, well-developed  HEAD:  Atraumatic, Normocephalic  EYES: EOMI, PERRLA, conjunctiva and sclera clear  ENMT: No tonsillar erythema, exudates, or enlargement; Moist mucous membranes, Good dentition, No lesions  NECK: Supple, No JVD, Normal thyroid  NERVOUS SYSTEM:  Alert & Oriented X2, Good concentration; Motor Strength 5/5 B/L upper and lower extremities; DTRs 2+ intact and symmetric  CHEST/LUNG: Clear to percussion bilaterally; No rales, rhonchi, wheezing, or rubs  HEART: Regular rate and rhythm; No murmurs, rubs, or gallops  ABDOMEN: Soft, Nontender, Nondistended; Bowel sounds present  EXTREMITIES:  2+ Peripheral Pulses, No clubbing, cyanosis, or edema  LYMPH: No lymphadenopathy noted  SKIN: sacral decubitus approximately 3.5" in diameter, parts of outer area w/ slough, center of wound healing w/ pink healthy tissue.        LABS:  CBC Full  -  ( 19 Dec 2020 07:24 )  WBC Count : 9.46 K/uL  RBC Count : 2.65 M/uL  Hemoglobin : 7.4 g/dL  Hematocrit : 24.8 %  Platelet Count - Automated : 333 K/uL  Mean Cell Volume : 93.6 fl  Mean Cell Hemoglobin : 27.9 pg  Mean Cell Hemoglobin Concentration : 29.8 gm/dL  Auto Neutrophil # : x  Auto Lymphocyte # : x  Auto Monocyte # : x  Auto Eosinophil # : x  Auto Basophil # : x  Auto Neutrophil % : x  Auto Lymphocyte % : x  Auto Monocyte % : x  Auto Eosinophil % : x  Auto Basophil % : x    12-19    145  |  107  |  13  ----------------------------<  87  4.0   |  32<H>  |  0.74    Ca    8.5      19 Dec 2020 07:24                [  ]  DVT Prophylaxis  [  ]  Nutrition, Brand, Rate         Goal Rate        Abnormal Nutritional Status -  Malnutrition   Cachexia      Morbid Obesity BMI >/=40    RADIOLOGY & ADDITIONAL STUDIES:  ***    Goals of Care Discussion with Family/Proxy/Other   - see note from/family meeting set up for...

## 2020-12-19 NOTE — PROGRESS NOTE ADULT - SUBJECTIVE AND OBJECTIVE BOX
S/p debridement of sacral decub 11/15 with Dr. Stewart  Called for re-evaluation of sacral decub by medical team due to persistent fevers and concern that decubitus is the source.  Patient seen and examined at bedside.     Vital Signs Last 24 Hrs  T(F): 99 (12-19-20 @ 04:36), Max: 99 (12-19-20 @ 04:36)  HR: 106 (12-19-20 @ 04:36)  BP: 114/72 (12-19-20 @ 04:36)  RR: 16 (12-19-20 @ 04:36)  SpO2: 98% (12-19-20 @ 04:36)  POCT Blood Glucose.: 90 mg/dL (18 Dec 2020 06:00)    GENERAL: NAD  BACK: Dressing taken down, decubitus ulcer pink with granulation tissue, small area of slough medially, no purulent drainage, pus or foul odor noted. No signs of overt infection.     I&O's Detail    LABS:                        7.4    9.46  )-----------( 333      ( 19 Dec 2020 07:24 )             24.8     12-19    145  |  107  |  13  ----------------------------<  87  4.0   |  32<H>  |  0.74    Ca    8.5      19 Dec 2020 07:24

## 2020-12-19 NOTE — PROGRESS NOTE ADULT - ASSESSMENT
Patient is a 65y old  Female from home, lives with daughter, ambulates with walker with PMH of recurrent SBO's, s/p exp lap, SB resection in 2015, ex lap, ALBINA in 2018, DVT, PE, on Xarelto, IVC filter, chronic leg swelling, and anasarca, Now send in to the ER by her PCP, Dr. Ross, for evaluation of  generalized weakness and hypotension BP of 80/40 during office visit. On admission, she found to have no fever and BP was in lower normal range and no Leukocytosis. The CXR is clear and CT abd/pelvis consistent with Ileus. The ID consult requested to assist with evaluation of infectious etiology of episode of hypotension. Found to have Bacteroides bacteremia and now developed septic shock on Cefepime and Flagyl. Hence transferred to ICU. 10/20/20.    # Hypotension  at office- BP of 80/40 - resolved   #  Bacteroides fragilis Bacteremia - 10/13/20 - ? source most likely GI- Repeat Blood Cxs have NGTD 10/16/20  # Ileus  - h/o recurrent SBO and s/p EXp. LAp  # COVID 19 negative   # Septic shock ( Hypothermia + hypotensive)- transferred to ICU since requiring pressor- source GI, The CT abd/pelvis shows nonspecific enterocolitis, noninfectious inflammatory bowel disease, or ischemic bowel, along with ileus.   # Pneumonia- HCAP vs Aspiration - on CXR 10/24 and CT chest 10/21- sputum Cx grew Methicillin resistant Staphylococcus aureus  and Stenotrophomonas maltophilia.  # S/p extubated 11/9/20  # Infected sacral ulcer- s/p debridement and culture grew Enterococcus, VRE and Candida, sensitivity is pending- The MRI of pelvis shows  Osteomyelitis of the lower sacrum and coccyx with overlying cutaneous ulceration.   # Fever- 11/8 and 11/19- BCX NGTD 11/19 - The CXR shows Improvement in bilateral airspace disease with persistent bibasilar consolidation. She is s/p removal of velazquez catheter and passed TOV.  # HCAP- 11/27/20,  Intermittent fever and CXR shows  bilateral consolidations right greater than left. - The procalcitonin level is 1.55  # s/p PICC line placement 11/30  # Persistent effusions left greater than right      would recommend:    1.  Sacral wound care as per Surgery   2. Monitor Temp. and c/w supportive care   3. Continue oral Linezolid  and oral fluconazole until  12/29/20  4. Aspiration precaution  5. Frequent repositioning   6. OOB to chair      d/w Covering NPDomenic    Attending Attestation:    Spent more than 35 minutes on total encounter, more than 50 % of the visit was spent counseling and/or coordinating care by the Attending physician.   Patient is a 65y old  Female from home, lives with daughter, ambulates with walker with PMH of recurrent SBO's, s/p exp lap, SB resection in 2015, ex lap, ALBINA in 2018, DVT, PE, on Xarelto, IVC filter, chronic leg swelling, and anasarca, Now send in to the ER by her PCP, Dr. Ross, for evaluation of  generalized weakness and hypotension BP of 80/40 during office visit. On admission, she found to have no fever and BP was in lower normal range and no Leukocytosis. The CXR is clear and CT abd/pelvis consistent with Ileus. The ID consult requested to assist with evaluation of infectious etiology of episode of hypotension. Found to have Bacteroides bacteremia and now developed septic shock on Cefepime and Flagyl. Hence transferred to ICU. 10/20/20.    # Hypotension  at office- BP of 80/40 - resolved   #  Bacteroides fragilis Bacteremia - 10/13/20 - ? source most likely GI- Repeat Blood Cxs have NGTD 10/16/20  # Ileus  - h/o recurrent SBO and s/p EXp. LAp  # COVID 19 negative   # Septic shock ( Hypothermia + hypotensive)- transferred to ICU since requiring pressor- source GI, The CT abd/pelvis shows nonspecific enterocolitis, noninfectious inflammatory bowel disease, or ischemic bowel, along with ileus.   # Pneumonia- HCAP vs Aspiration - on CXR 10/24 and CT chest 10/21- sputum Cx grew Methicillin resistant Staphylococcus aureus  and Stenotrophomonas maltophilia.  # S/p extubated 11/9/20  # Infected sacral ulcer- s/p debridement and culture grew Enterococcus, VRE and Candida, sensitivity is pending- The MRI of pelvis shows  Osteomyelitis of the lower sacrum and coccyx with overlying cutaneous ulceration.   # Fever- 11/8 and 11/19- BCX NGTD 11/19 - The CXR shows Improvement in bilateral airspace disease with persistent bibasilar consolidation. She is s/p removal of velazquez catheter and passed TOV.  # HCAP- 11/27/20,  Intermittent fever and CXR shows  bilateral consolidations right greater than left. - The procalcitonin level is 1.55  # s/p PICC line placement 11/30  # Persistent effusions left greater than right      would recommend:    1. Continue Sacral wound care as per Surgery   2. Monitor Temp. and c/w supportive care   3. Continue oral Linezolid  and oral fluconazole until  12/29/20  4. Aspiration precaution  5. Frequent repositioning   6. OOB to chair      d/w Covering Domenic POOL    Attending Attestation:    Spent more than 35 minutes on total encounter, more than 50 % of the visit was spent counseling and/or coordinating care by the Attending physician.

## 2020-12-19 NOTE — PROGRESS NOTE ADULT - PROBLEM SELECTOR PLAN 8
Pt w/ prior hx of DVT, PE hence full dose anticoagulant as per discussion w/ Dr. Mcmahan.   DVT and GI prophylaxis.  OOB daily   Awaiting accepting facility. CM following. Needs to be afebrile.  Overall prognosis is poor.

## 2020-12-19 NOTE — PROGRESS NOTE ADULT - ASSESSMENT
65y Female from home, lives with daughter, ambulates with walker with PMH of recurrent SBO's, s/p exp lap, SB resection in 2015, ex lap, ALBINA in 2018, DVT, PE, on Xarelto, IVC filter, chronic leg swelling, and anasarca, came in to the ED due to hypotension during office visit. Patient was found to be bacteremic with bacteroids fragilis. Admitted in ICU due to hypotension and hypothermia. patient completed course of antibiotics . BCx X2 negative. Ammonia level elevated and patient refused NGT placement, mental status deteriorated and patient desaturated to high 70%, patient got intubated on 10/24 . Sputum was positive for MRSA and Stenotrophomonas. patient started on vancomycin and Levaquin. patient was given lactulose for high ammonia level. kidney function and liver function started to deteriorate and patient was leaning toward multi organ failure . high dose of Lasix started and patient was aggressively diuresed. Kidney function improved . Ammonia level dropped to less than 10.  Mental status improved and patient extubated on 11/3/2020. Patient was seen by wound specialist and decubitus ulcer debrided. patient had a drop in h/h s/p debridement . Received 1 unit PRBC .                 11/12  Transferred from ICU to SCU  `11/17  Midodrine d/c secondary to daptomycin being started.  11/18 Febrile, Tmax 101.5. Repeat BC sent.   11/19 Discontinue velazquez.   11/20 febrile overnight.   11/22 s/p 2 units PRBC for Hgb 6.4  11/24 MRI pelvis (+) OM  11/25 PAVAN no endocarditis. Awaiting PICC  11/27 Spiked fever of 101F; Repeated cultures, chest xray, lactate, Follow up results. IR Denies PICC Placement due to fever. Plan for IR Monday (NPO Sunday) if afebrile over the weekend. Given one dose of lasix IV today for pleural effusion on chest xray; Give one more dose of IV Lasix 40mg tomorrow at 2pm if BP stable. Continue with daily lasix.     12/14 PICC line removed  12/17- low grade fever, cxr negative, no leukocytosis, procalcitonin trending down.   12/18- Low grade temp today 99.8F, cooperative during exam.  Leukocytosis continue to downtrend. Off Daptomycin and currently on Linezolid day 5/14. D/C planning in progress. CM following.  Issues w/ Linezolid cost.    19/19-  Pt remained stable- T-max 99F.  Sx consulted for sacral wound eval for possible redebridement given intermittent low fever as per ID and nephro recommendation.  Per sx, no surgical intervention at present. Will get UA and urine culture to evaluate for possible source of infection.  Of note, d/c planning ongoing and prescription for Linezolid e-prescribed to [facility] on 12/18.

## 2020-12-19 NOTE — PROGRESS NOTE ADULT - ASSESSMENT
65y Female from home, lives with daughter, ambulates with walker with PMH of recurrent SBO's, s/p exp lap, SB resection in 2015, ex lap, ALBINA in 2018, DVT, PE, on Xarelto, IVC filter, chronic leg swelling, and anasarca, came in to the ED due to hypotension during office visit. Patient was found to be bacteremic with bacteroids fragilis. Admitted in ICU due to hypotension and hypothermia. patient completed course of antibiotics . BCx X2 negative. Ammonia level elevated and patient refused NGT placement, mental status deteriorated and patient desaturated to high 70%, patient got intubated on 10/24 . Sputum was positive for MRSA and Stenotrophomonas. patient started on vancomycin and Levaquin. patient was given lactulose for high ammonia level. kidney function and liver function started to deteriorate and patient was leaning toward multi organ failure . high dose of Lasix started and patient was aggressively diuresed. Kidney function improved . Ammonia level dropped to less than 10.  Mental status improved and patient extubated on 11/3/2020. Patient was seen by wound specialist and decubitus ulcer debrided. patient had a drop in h/h s/p debridement . Received 1 unit PRBC .                 11/12  Transferred from ICU to SCU  `11/17  Midodrine d/c secondary to daptomycin being started.  11/18 Febrile, Tmax 101.5. Repeat BC sent.   11/19 Discontinue velazquez.   11/20 febrile overnight.   11/22 s/p 2 units PRBC for Hgb 6.4  11/24 MRI pelvis (+) OM  11/25 PAVAN no endocarditis. Awaiting PICC  11/27 Spiked fever of 101F; Repeated cultures, chest xray, lactate, Follow up results. IR Denies PICC Placement due to fever. Plan for IR Monday (NPO Sunday) if afebrile over the weekend. Given one dose of lasix IV today for pleural effusion on chest xray; Give one more dose of IV Lasix 40mg tomorrow at 2pm if BP stable. Continue with daily lasix.     12/14 PICC line removed  12/17- low grade fever, cxr negative, no leukocytosis, procalcitonin trending down.   12/18- Low grade temp today 99.8F, cooperative during exam.  Leukocytosis continue to downtrend. Off Daptomycin and currently on Linezolid day 5/14. D/C planning in progress. CM following.  Issues w/ Linezolid cost.

## 2020-12-20 NOTE — PROGRESS NOTE ADULT - PROBLEM SELECTOR PLAN 1
Bacteroides fragilis per BC on 10/13  Sputum positive for MRSA and Stenotrophomonas  Aspiration pneumonia vs HCAP .   Repeat BC 10/16 and 11/19 NGTD ,UC on 11/20 NGTD   Infected sacral ulcer- s/p debridement and culture grew Enterococcus (VRE) and Candida  Leukocytosis resolved, Cdiff negative  MRI sacrum/pelvis shows Osteomyelitis  c/w abx per ID. Linezolid 600mg  q12h D5/14. Fluconazole 200mg daily until 12/29. - pt with fevers. Tmax - 12/19 - 102.7  - Pending urine culture, UA - pt to be straight cath  - Sx consulted for sacral wound eval for possible redebridement given intermittent low fever as per ID and nephro recommendation.  Per sx, no surgical intervention at present.   - Pt continues to be on zyvvox and diflucan for OM of sacrum - pt with fevers. Tmax - 12/19 - 102.7  - Pending urine culture, UA chest xray, bc  - Sx consulted for sacral wound eval for possible redebridement given intermittent low fever as per ID and nephro recommendation.  Per sx, no surgical intervention at present.   - Pt continues to be on Zyvox and diflucan for OM of sacrum

## 2020-12-20 NOTE — PROGRESS NOTE ADULT - PROBLEM SELECTOR PLAN 2
Multi-organ failure with heart failure.  Plan: Echo shows improved EF   s/p IV Lasix 11/27 for pleural effusions;   Currently appears euvolemic  Lasix po 20mg with hold parameters   Continue supportive care. Bacteroides fragilis per BC on 10/13  Sputum positive for MRSA and Stenotrophomonas  Aspiration pneumonia vs HCAP .   Repeat BC 10/16 and 11/19 NGTD ,UC on 11/20 NGTD   Infected sacral ulcer- s/p debridement and culture grew Enterococcus (VRE) and Candida  Leukocytosis resolved, Cdiff negative  MRI sacrum/pelvis shows Osteomyelitis  c/w abx per ID. Linezolid 600mg  q12h D5/14. Fluconazole 200mg daily until 12/29.

## 2020-12-20 NOTE — PROGRESS NOTE ADULT - SUBJECTIVE AND OBJECTIVE BOX
MARIBETH PADILLA    SCU progress note    INTERVAL HPI/OVERNIGHT EVENTS: ***    DNR [x ]   DNI  [ x ] Full code    Covid - 19 PCR:     PRESSORS: [ ] YES [x ] NO  fluconAZOLE   Tablet 200 milliGRAM(s) Oral daily  linezolid    Tablet 600 milliGRAM(s) Oral every 12 hours  rifAXIMin 550 milliGRAM(s) Oral two times a day    Cardiovascular:  Heart Failure  Acute   Acute on Chronic  Chronic       furosemide    Tablet 20 milliGRAM(s) Oral daily    Pulmonary:  ALBUTerol    90 MICROgram(s) HFA Inhaler 2 Puff(s) Inhalation every 6 hours PRN    Hematalogic:  enoxaparin Injectable 60 milliGRAM(s) SubCutaneous two times a day    Other:  acetaminophen  Suppository .. 650 milliGRAM(s) Rectal every 6 hours PRN  ascorbic acid 500 milliGRAM(s) Oral two times a day  chlorhexidine 2% Cloths 1 Application(s) Topical daily  collagenase Ointment 1 Application(s) Topical daily  lactobacillus acidophilus 1 Tablet(s) Oral daily  multivitamin/minerals 1 Tablet(s) Oral daily  nystatin Powder 1 Application(s) Topical two times a day  pantoprazole    Tablet 40 milliGRAM(s) Oral before breakfast  sodium chloride 0.9% lock flush 10 milliLiter(s) IV Push every 1 hour PRN  zinc sulfate 220 milliGRAM(s) Oral daily    acetaminophen  Suppository .. 650 milliGRAM(s) Rectal every 6 hours PRN  ALBUTerol    90 MICROgram(s) HFA Inhaler 2 Puff(s) Inhalation every 6 hours PRN  ascorbic acid 500 milliGRAM(s) Oral two times a day  chlorhexidine 2% Cloths 1 Application(s) Topical daily  collagenase Ointment 1 Application(s) Topical daily  enoxaparin Injectable 60 milliGRAM(s) SubCutaneous two times a day  fluconAZOLE   Tablet 200 milliGRAM(s) Oral daily  furosemide    Tablet 20 milliGRAM(s) Oral daily  lactobacillus acidophilus 1 Tablet(s) Oral daily  linezolid    Tablet 600 milliGRAM(s) Oral every 12 hours  multivitamin/minerals 1 Tablet(s) Oral daily  nystatin Powder 1 Application(s) Topical two times a day  pantoprazole    Tablet 40 milliGRAM(s) Oral before breakfast  rifAXIMin 550 milliGRAM(s) Oral two times a day  sodium chloride 0.9% lock flush 10 milliLiter(s) IV Push every 1 hour PRN  zinc sulfate 220 milliGRAM(s) Oral daily    Drug Dosing Weight  Height (cm): 167.6 (21 Oct 2020 02:26)  Weight (kg): 56.2 (21 Oct 2020 02:26)  BMI (kg/m2): 20 (21 Oct 2020 02:26)  BSA (m2): 1.63 (21 Oct 2020 02:26)    CENTRAL LINE: [ ] YES n[ ] NO  LOCATION:   DATE INSERTED:  REMOVE: [ ] YES [ ] NO  EXPLAIN:    TREJO: [n ] YES [ ] NO    DATE INSERTED:  REMOVE:  [ ] YES [ ] NO  EXPLAIN:    PAST MEDICAL & SURGICAL HISTORY:  Anasarca    Pulmonary embolism    DVT (deep venous thrombosis)    SBO (small bowel obstruction)    H/O exploratory laparotomy                      PHYSICAL EXAM:    GENERAL: NAD, cachetic   HEAD:  Atraumatic, Normocephalic  EYES: EOMI, PERRLA, conjunctiva and sclera clear  ENMT: No tonsillar erythema, exudates, or enlargement; dry membranes  NECK: Supple, No JVD, Normal thyroid  NERVOUS SYSTEM:  Alert & Oriented X3,  CHEST/LUNG: decreased breath sounds  HEART: Regular rate and rhythm; No murmurs, rubs, or gallops  ABDOMEN: Soft, Nontender, Nondistended; Bowel sounds present  EXTREMITIES:  2+ Peripheral Pulses, No clubbing, cyanosis, or edema  LYMPH: No lymphadenopathy noted  SKIN: + decubiti      LABS:  CBC Full  -  ( 20 Dec 2020 06:38 )  WBC Count : 8.37 K/uL  RBC Count : 2.81 M/uL  Hemoglobin : 8.0 g/dL  Hematocrit : 26.5 %  Platelet Count - Automated : 269 K/uL  Mean Cell Volume : 94.3 fl  Mean Cell Hemoglobin : 28.5 pg  Mean Cell Hemoglobin Concentration : 30.2 gm/dL  Auto Neutrophil # : x  Auto Lymphocyte # : x  Auto Monocyte # : x  Auto Eosinophil # : x  Auto Basophil # : x  Auto Neutrophil % : x  Auto Lymphocyte % : x  Auto Monocyte % : x  Auto Eosinophil % : x  Auto Basophil % : x    12-20    145  |  108  |  12  ----------------------------<  79  3.9   |  32<H>  |  0.71    Ca    8.6      20 Dec 2020 06:38                [ x ]  DVT Prophylaxis  [  ]  Nutrition, Brand, Rate         Goal Rate         Abdominal Nutritional Status -  Malnutrition   Cachexia      Morbid Obesity BMI >/=40    RADIOLOGY & ADDITIONAL STUDIES:  ***    [  ] Goals of Care Discussion with Family/Proxy/Other       TIME SPENT: 35 minutes

## 2020-12-20 NOTE — PROGRESS NOTE ADULT - ASSESSMENT
Patient is a 65y old  Female from home, lives with daughter, ambulates with walker with PMH of recurrent SBO's, s/p exp lap, SB resection in 2015, ex lap, ALBINA in 2018, DVT, PE, on Xarelto, IVC filter, chronic leg swelling, and anasarca, Now send in to the ER by her PCP, Dr. Ross, for evaluation of  generalized weakness and hypotension BP of 80/40 during office visit. On admission, she found to have no fever and BP was in lower normal range and no Leukocytosis. The CXR is clear and CT abd/pelvis consistent with Ileus. The ID consult requested to assist with evaluation of infectious etiology of episode of hypotension. Found to have Bacteroides bacteremia and now developed septic shock on Cefepime and Flagyl. Hence transferred to ICU. 10/20/20.    # Hypotension  at office- BP of 80/40 - resolved   #  Bacteroides fragilis Bacteremia - 10/13/20 - ? source most likely GI- Repeat Blood Cxs have NGTD 10/16/20  # Ileus  - h/o recurrent SBO and s/p EXp. LAp  # COVID 19 negative   # Septic shock ( Hypothermia + hypotensive)- transferred to ICU since requiring pressor- source GI, The CT abd/pelvis shows nonspecific enterocolitis, noninfectious inflammatory bowel disease, or ischemic bowel, along with ileus.   # Pneumonia- HCAP vs Aspiration - on CXR 10/24 and CT chest 10/21- sputum Cx grew Methicillin resistant Staphylococcus aureus  and Stenotrophomonas maltophilia.  # S/p extubated 11/9/20  # Infected sacral ulcer- s/p debridement and culture grew Enterococcus, VRE and Candida, sensitivity is pending- The MRI of pelvis shows  Osteomyelitis of the lower sacrum and coccyx with overlying cutaneous ulceration.   # Fever- 11/8 and 11/19- BCX NGTD 11/19 - The CXR shows Improvement in bilateral airspace disease with persistent bibasilar consolidation. She is s/p removal of velazquez catheter and passed TOV.  # HCAP- 11/27/20,  Intermittent fever and CXR shows  bilateral consolidations right greater than left. - The procalcitonin level is 1.55  # s/p PICC line placement 11/30  # Persistent effusions left greater than right      would recommend:    1. Continue Sacral wound care as per Surgery   2. Monitor Temp. and c/w supportive care   3. Continue oral Linezolid  and oral fluconazole until  12/29/20  4. Aspiration precaution  5. Frequent repositioning   6. OOB to chair        Attending Attestation:    Spent more than 35 minutes on total encounter, more than 50 % of the visit was spent counseling and/or coordinating care by the Attending physician.       Patient is a 65y old  Female from home, lives with daughter, ambulates with walker with PMH of recurrent SBO's, s/p exp lap, SB resection in 2015, ex lap, ALBINA in 2018, DVT, PE, on Xarelto, IVC filter, chronic leg swelling, and anasarca, Now send in to the ER by her PCP, Dr. Ross, for evaluation of  generalized weakness and hypotension BP of 80/40 during office visit. On admission, she found to have no fever and BP was in lower normal range and no Leukocytosis. The CXR is clear and CT abd/pelvis consistent with Ileus. The ID consult requested to assist with evaluation of infectious etiology of episode of hypotension. Found to have Bacteroides bacteremia and now developed septic shock on Cefepime and Flagyl. Hence transferred to ICU. 10/20/20.    # Hypotension  at office- BP of 80/40 - resolved   #  Bacteroides fragilis Bacteremia - 10/13/20 - ? source most likely GI- Repeat Blood Cxs have NGTD 10/16/20  # Ileus  - h/o recurrent SBO and s/p EXp. LAp  # COVID 19 negative   # Septic shock ( Hypothermia + hypotensive)- transferred to ICU since requiring pressor- source GI, The CT abd/pelvis shows nonspecific enterocolitis, noninfectious inflammatory bowel disease, or ischemic bowel, along with ileus.   # Pneumonia- HCAP vs Aspiration - on CXR 10/24 and CT chest 10/21- sputum Cx grew Methicillin resistant Staphylococcus aureus  and Stenotrophomonas maltophilia.  # S/p extubated 11/9/20  # Infected sacral ulcer- s/p debridement and culture grew Enterococcus, VRE and Candida, sensitivity is pending- The MRI of pelvis shows  Osteomyelitis of the lower sacrum and coccyx with overlying cutaneous ulceration.   # Fever- 11/8 and 11/19- BCX NGTD 11/19 - The CXR shows Improvement in bilateral airspace disease with persistent bibasilar consolidation. She is s/p removal of velazquez catheter and passed TOV.  # HCAP- 11/27/20,  Intermittent fever and CXR shows  bilateral consolidations right greater than left. - The procalcitonin level is 1.55  # s/p PICC line placement 11/30  # Persistent effusions left greater than right      would recommend:    1. Monitor Temp. and c/w supportive care   2. Continue Sacral wound care as per Surgery   3. Continue oral Linezolid  and oral fluconazole until  12/29/20  4. Aspiration precaution  5. Frequent repositioning   6. OOB to chair        Attending Attestation:    Spent more than 35 minutes on total encounter, more than 50 % of the visit was spent counseling and/or coordinating care by the Attending physician.

## 2020-12-20 NOTE — PROGRESS NOTE ADULT - PROBLEM SELECTOR PLAN 7
Awaiting for facility acceptance. SW following  Fluconazole switched to oral. Encourage oral intake.  Continue supplementation.   Assist with feedings.

## 2020-12-20 NOTE — PROGRESS NOTE ADULT - PROBLEM SELECTOR PLAN 1
- pt with fevers. Tmax - 12/19 - 102.7  - Pending urine culture, UA - pt to be straight cath  - Sx consulted for sacral wound eval for possible redebridement given intermittent low fever as per ID and nephro recommendation.  Per sx, no surgical intervention at present.   - Pt continues to be on zyvvox and diflucan for OM of sacrum

## 2020-12-20 NOTE — PROGRESS NOTE ADULT - PROBLEM SELECTOR PLAN 3
Probably secondary to portal hypertension- LFTs unchanged and abd exam benign   Continue rifaximin  Hepatology following. Multi-organ failure with heart failure.  Plan: Echo shows improved EF   s/p IV Lasix 11/27 for pleural effusions;   Currently appears euvolemic  Lasix po 20mg with hold parameters   Continue supportive care.

## 2020-12-20 NOTE — PROGRESS NOTE ADULT - SUBJECTIVE AND OBJECTIVE BOX
Patient was seen and examined  Patient is a 65y old  Female who presents with a chief complaint of Hypotension (20 Dec 2020 12:21)      INTERVAL HPI/OVERNIGHT EVENTS:  T(C): 37.1 (12-20-20 @ 05:11), Max: 39.3 (12-19-20 @ 19:54)  HR: 95 (12-20-20 @ 10:00) (94 - 106)  BP: 105/75 (12-20-20 @ 10:00) (86/66 - 110/62)  RR: 16 (12-20-20 @ 05:11) (16 - 18)  SpO2: 99% (12-20-20 @ 05:11) (98% - 99%)  Wt(kg): --  I&O's Summary      LABS:                        8.0    8.37  )-----------( 269      ( 20 Dec 2020 06:38 )             26.5     12-20    145  |  108  |  12  ----------------------------<  79  3.9   |  32<H>  |  0.71    Ca    8.6      20 Dec 2020 06:38          CAPILLARY BLOOD GLUCOSE                  MEDICATIONS  (STANDING):  ascorbic acid 500 milliGRAM(s) Oral two times a day  chlorhexidine 2% Cloths 1 Application(s) Topical daily  collagenase Ointment 1 Application(s) Topical daily  enoxaparin Injectable 60 milliGRAM(s) SubCutaneous two times a day  fluconAZOLE   Tablet 200 milliGRAM(s) Oral daily  furosemide    Tablet 20 milliGRAM(s) Oral daily  lactobacillus acidophilus 1 Tablet(s) Oral daily  linezolid    Tablet 600 milliGRAM(s) Oral every 12 hours  multivitamin/minerals 1 Tablet(s) Oral daily  nystatin Powder 1 Application(s) Topical two times a day  pantoprazole    Tablet 40 milliGRAM(s) Oral before breakfast  rifAXIMin 550 milliGRAM(s) Oral two times a day  zinc sulfate 220 milliGRAM(s) Oral daily    MEDICATIONS  (PRN):  acetaminophen  Suppository .. 650 milliGRAM(s) Rectal every 6 hours PRN Temp greater or equal to 38C (100.4F)  ALBUTerol    90 MICROgram(s) HFA Inhaler 2 Puff(s) Inhalation every 6 hours PRN Shortness of Breath and/or Wheezing  sodium chloride 0.9% lock flush 10 milliLiter(s) IV Push every 1 hour PRN Pre/post blood products, medications, blood draw, and to maintain line patency      RADIOLOGY & ADDITIONAL TESTS:    Imaging Personally Reviewed:  [ ] YES  [ ] NO    REVIEW OF SYSTEMS:  CONSTITUTIONAL: No fever, weight loss, or fatigue  EYES: No eye pain, visual disturbances, or discharge  ENMT:  No difficulty hearing, tinnitus, vertigo; No sinus or throat pain  NECK: No pain or stiffness  BREASTS: No pain, masses, or nipple discharge  RESPIRATORY: No cough, wheezing, chills or hemoptysis; No shortness of breath  CARDIOVASCULAR: No chest pain, palpitations, dizziness, or leg swelling  GASTROINTESTINAL: No abdominal or epigastric pain. No nausea, vomiting, or hematemesis; No diarrhea or constipation. No melena or hematochezia.  GENITOURINARY: No dysuria, frequency, hematuria, or incontinence  NEUROLOGICAL: No headaches, memory loss, loss of strength, numbness, or tremors  SKIN: No itching, burning, rashes, or lesions   LYMPH NODES: No enlarged glands  ENDOCRINE: No heat or cold intolerance; No hair loss  MUSCULOSKELETAL: No joint pain or swelling; No muscle, back, or extremity pain  PSYCHIATRIC: No depression, anxiety, mood swings, or difficulty sleeping  HEME/LYMPH: No easy bruising, or bleeding gums  ALLERY AND IMMUNOLOGIC: No hives or eczema      Consultant(s) Notes Reviewed:  [ x ] YES  [ ] NO    PHYSICAL EXAM:  GENERAL: NAD, well-groomed, well-developed  HEAD:  Atraumatic, Normocephalic  EYES: EOMI, PERRLA, conjunctiva and sclera clear  ENMT: No tonsillar erythema, exudates, or enlargement; Moist mucous membranes, Good dentition, No lesions  NECK: Supple, No JVD, Normal thyroid  NERVOUS SYSTEM:  Alert & Oriented X3, Good concentration; Motor Strength 5/5 B/L upper and lower extremities; DTRs 2+ intact and symmetric  CHEST/LUNG: Clear to percussion bilaterally; No rales, rhonchi, wheezing, or rubs  HEART: Regular rate and rhythm; No murmurs, rubs, or gallops  ABDOMEN: Soft, Nontender, Nondistended; Bowel sounds present  EXTREMITIES:  2+ Peripheral Pulses, No clubbing, cyanosis, or edema  LYMPH: No lymphadenopathy noted  SKIN: No rashes or lesions    Care Discussed with Consultants/Other Providers [ x] YES  [ ] NO

## 2020-12-20 NOTE — PROGRESS NOTE ADULT - PROBLEM SELECTOR PLAN 6
Encourage oral intake.  Continue supplementation.   Assist with feedings. of chronic disease, H&H unchanged  Continue to follow.

## 2020-12-20 NOTE — PROGRESS NOTE ADULT - PROBLEM SELECTOR PLAN 4
Continue wound care.  turn every 2 hours  Continue antibiotics as per Dr Patricio. Needs antibiotics thru 12/29.   OOB daily. Probably secondary to portal hypertension- LFTs unchanged and abd exam benign   Continue rifaximin  Hepatology following.

## 2020-12-20 NOTE — PROGRESS NOTE ADULT - SUBJECTIVE AND OBJECTIVE BOX
Patient is seen and examined at the bed side,  is afebrile now.        REVIEW OF SYSTEMS: All other review systems are negative      ALLERGIES: No Known Allergies      Vital Signs Last 24 Hrs  T(C): 37.1 (20 Dec 2020 13:26), Max: 39.3 (19 Dec 2020 19:54)  T(F): 98.8 (20 Dec 2020 13:26), Max: 102.7 (19 Dec 2020 19:54)  HR: 99 (20 Dec 2020 13:26) (94 - 106)  BP: 99/62 (20 Dec 2020 13:26) (86/66 - 110/62)  BP(mean): --  RR: 20 (20 Dec 2020 13:26) (16 - 20)  SpO2: 100% (20 Dec 2020 13:26) (98% - 100%)      PHYSICAL EXAM:  GENERAL: Not in distress, off oxygen   CHEST/LUNG: Not using accessory muscles   HEART: s1 and s2 present  ABDOMEN:  Nontender and  Nondistended  EXTREMITIES: B/L UE edematous improved  CNS: Awake and Alert         LABS:                        8.0    8.37  )-----------( 269      ( 20 Dec 2020 06:38 )             26.5                           7.4    9.46  )-----------( 333      ( 19 Dec 2020 07:24 )             24.8                9.4    13.14 )-----------( 149      ( 14 Nov 2020 07:58 )             28.7       12-20    145  |  108  |  12  ----------------------------<  79  3.9   |  32<H>  |  0.71    Ca    8.6      20 Dec 2020 06:38    TPro  6.8  /  Alb  1.8<L>  /  TBili  1.2  /  DBili  x   /  AST  39  /  ALT  26  /  AlkPhos  276<H>  12-17    11-06    138  |  104  |  37<H>  ----------------------------<  81  3.9   |  25  |  2.37<H>    Ca    8.1<L>      06 Nov 2020 06:20  Phos  3.4     11-06  Mg     2.0     11-06    TPro  5.1<L>  /  Alb  2.8<L>  /  TBili  9.2<H>  /  DBili  x   /  AST  233<H>  /  ALT  99<H>  /  AlkPhos  96  11-06      Procalcitonin, Serum (12.16.20 @ 19:11)   Procalcitonin, Serum: 0.98:     Vancomycin Level, Trough (11.07.20 @ 21:49)   Vancomycin Level, Trough: 16.1:     Vancomycin Level, Trough (11.07.20 @ 07:08) : 19.5  Vancomycin Level, Trough (11.06.20 @ 06:20) : 19.9:   Procalcitonin, Serum (11.28.20 @ 15:38) : 1.55:       MEDICATIONS  (STANDING):    ascorbic acid 500 milliGRAM(s) Oral two times a day  chlorhexidine 2% Cloths 1 Application(s) Topical daily  collagenase Ointment 1 Application(s) Topical daily  enoxaparin Injectable 60 milliGRAM(s) SubCutaneous two times a day  fluconAZOLE   Tablet 200 milliGRAM(s) Oral daily  furosemide    Tablet 20 milliGRAM(s) Oral daily  lactobacillus acidophilus 1 Tablet(s) Oral daily  linezolid    Tablet 600 milliGRAM(s) Oral every 12 hours  multivitamin/minerals 1 Tablet(s) Oral daily  nystatin Powder 1 Application(s) Topical two times a day  pantoprazole    Tablet 40 milliGRAM(s) Oral before breakfast  rifAXIMin 550 milliGRAM(s) Oral two times a day  zinc sulfate 220 milliGRAM(s) Oral daily        RADIOLOGY & ADDITIONAL TESTS:    12/9/20 : Xray Chest 1 View- PORTABLE-Routine (Xray Chest 1 View- PORTABLE-Routine in AM.) (12.09.20 @ 11:54) >  IMPRESSION: Persistent effusions left greater than right.      11/27/20 : Xray Chest 1 View- PORTABLE-Urgent (Xray Chest 1 View- PORTABLE-Urgent .) (11.27.20 @ 06:53) Increased interstitial lung markings with bilateral consolidations right greater than left. Small right pleural effusion. Overall worsening compared to prior exam.      11/24/20 : MR Pelvis Bony Only No Cont (11.24.20 @ 18:02) : Osteomyelitis of the lower sacrum and coccyx with overlying cutaneous ulceration,      11/19/20 : Xray Chest 1 View- PORTABLE-Urgent (Xray Chest 1 View- PORTABLE-Urgent .) (11.19.20 @ 23:53): Improvement in bilateral airspace disease with persistent bibasilar consolidation and small effusions.    11/4/20 : Xray Chest 1 View- PORTABLE-Routine (Xray Chest 1 View- PORTABLE-Routine in AM.) (11.04.20 @ 10:59) Reexpansion of the left lung with residual left pleural effusion and/or infiltrate.  Right pulmonary edema unchanged.  Tubes and catheters in satisfactory position.      10/25/20: Xray Chest 1 View- PORTABLE-Routine (Xray Chest 1 View- PORTABLE-Routine in AM.) (10.25.20 @ 09:21) Frontal expiratory view of the chest shows the heart to be similar in size. Endotracheal tube, right jugular line and feeding tube remain present. The lungs show similar left upper lobe infiltrate with progression of right perihilar infiltrate. Pleural effusions are similar. There is no evidence of pneumothorax.    10/21/20 : CT Abdomen and Pelvis w/ Oral Cont and w/ IV Cont (10.21.20 @ 15:59) Mural thickening of the left colon and rectum. Liquid stool in the colon. Apparent segmental mural thickening of the mid to distal small bowel and the proximal duodenum. Findings may represent nonspecific enterocolitis, noninfectious inflammatory bowel disease, or ischemic bowel. Clinical correlation is recommended. The celiac axis artery, SMA, and KINA are patent without stenosis.    Dilatation of the mid small bowel is again noted. Oral contrast has reached the terminal ileum. Findings may represent small bowel obstruction, or ileus related to nonspecific enterocolitis.   Cholelithiasis. Moderate to large ascites in the abdomen, increased since the previous examination. 5 mm nonspecific noncalcified left upper lobe lung nodule; if the patient's is in the high risk category (i.e. smoker), follow-up chest CT may be pursued in 12 months to ensure stability. Combination of atelectasis and consolidation in the left lower lobe.  Possible 1.0 cm hypodense lesion in the left lobe of the thyroid. Thyroid ultrasound may be pursued for further evaluation.   Mild bilateral pleural effusions.  Aging determinate compression fracture at T5 vertebra.        MICROBIOLOGY DATA:  MRSA/MSSA PCR (12.01.20 @ 01:16)   MRSA PCR Result.: Detected:    MRSA/MSSA PCR (11.28.20 @ 11:34)   MRSA PCR Result.: Detected:     Urine Microscopic-Add On (NC) (11.20.20 @ 04:12)   Red Blood Cell - Urine: 5-10 /HPF   White Blood Cell - Urine: 11-25 /HPF   Calcium Oxalate Crystals: Few /HPF   Bacteria: Moderate /HPF   Comment - Urine: few amorphous urates   Epithelial Cells: Few /HPF     Culture - Blood (11.19.20 @ 10:19)   Specimen Source: .Blood Blood-Peripheral   Culture Results: No growth to date.     Culture - Surgical Swab (11.12.20 @ 05:01)   - Ampicillin: R >8 Predicts results to ampicillin/sulbactam, amoxacillin-clavulanate and piperacillin-tazobactam.   - Levofloxacin: R >4   - Linezolid: S 1   - Tetra/Doxy: R >8   - Vancomycin: R >16   Specimen Source: .Surgical Swab Sacral decub   Culture Results:   Few Enterococcus faecium (vancomycin resistant)   Rare Candida albicans "Susceptibilities not performed"   Organism Identification: Enterococcus faecium (vancomycin resistant)   Organism: Enterococcus faecium (vancomycin resistant)   Method Type: STEWART     Culture - Surgical Swab (11.12.20 @ 05:01)   Specimen Source: .Surgical Swab Sacral decub   Culture Results:  Few Enterococcus faecium Susceptibility to follow.   Rare Candida albicans "Susceptibilities not performed"     Culture - Sputum . (10.26.20 @ 00:26)   - Ampicillin/Sulbactam: R <=8/4   - Cefazolin: R >16   - Ceftazidime: I 16   - Clindamycin: R >4   - Erythromycin: R >4   - Gentamicin: S <=1 Should not be used as monotherapy   - Levofloxacin: S <=0.5   - Linezolid: S 2   - Oxacillin: R >2   - Penicillin: R >8   - RIF- Rifampin: S <=1 Should not be used as monotherapy   - Tetra/Doxy: S <=1   - Trimethoprim/Sulfamethoxazole: S <=0.5/9.5   - Trimethoprim/Sulfamethoxazole: S <=0.5/9.5   - Vancomycin: S 1     MRSA/MSSA PCR (10.21.20 @ 09:41)   MRSA PCR Result.: Detected:       Culture - Blood (10.20.20 @ 22:09)   Specimen Source: .Blood Blood   Culture Results:  No growth to date.     Culture - Blood (10.20.20 @ 22:09)   Specimen Source: .Blood Blood   Culture Results:   No growth to date.     Culture - Blood in AM (10.16.20 @ 10:13)   Specimen Source: .Blood Blood-Peripheral   Culture Results: No growth to date.     Culture - Blood in AM (10.16.20 @ 10:13)   Specimen Source: .Blood Blood-Peripheral   Culture Results: No growth to date.     Culture - Blood (10.13.20 @ 22:23)   Growth in anaerobic bottle: Gram Negative Rods   Specimen Source: .Blood Blood-Peripheral   Organism: Blood Culture PCR   Culture Results: Growth in anaerobic bottle: Bacteroides fragilis   "Susceptibilities not performed"     Culture - Blood (10.13.20 @ 22:23)   Specimen Source: .Blood Blood-Peripheral   Culture Results:   No growth to date.            Patient is seen and examined at the bed side,  is afebrile now, still having intermittent fever.     REVIEW OF SYSTEMS: All other review systems are negative    ALLERGIES: No Known Allergies      Vital Signs Last 24 Hrs  T(C): 37.1 (20 Dec 2020 13:26), Max: 39.3 (19 Dec 2020 19:54)  T(F): 98.8 (20 Dec 2020 13:26), Max: 102.7 (19 Dec 2020 19:54)  HR: 99 (20 Dec 2020 13:26) (94 - 106)  BP: 99/62 (20 Dec 2020 13:26) (86/66 - 110/62)  BP(mean): --  RR: 20 (20 Dec 2020 13:26) (16 - 20)  SpO2: 100% (20 Dec 2020 13:26) (98% - 100%)      PHYSICAL EXAM:  GENERAL: Not in distress, off oxygen   CHEST/LUNG: Not using accessory muscles   HEART: s1 and s2 present  ABDOMEN:  Nontender and  Nondistended  EXTREMITIES: B/L UE edematous improved  CNS: Awake and Alert         LABS:                        8.0    8.37  )-----------( 269      ( 20 Dec 2020 06:38 )             26.5                           7.4    9.46  )-----------( 333      ( 19 Dec 2020 07:24 )             24.8                9.4    13.14 )-----------( 149      ( 14 Nov 2020 07:58 )             28.7       12-20    145  |  108  |  12  ----------------------------<  79  3.9   |  32<H>  |  0.71    Ca    8.6      20 Dec 2020 06:38    TPro  6.8  /  Alb  1.8<L>  /  TBili  1.2  /  DBili  x   /  AST  39  /  ALT  26  /  AlkPhos  276<H>  12-17    11-06    138  |  104  |  37<H>  ----------------------------<  81  3.9   |  25  |  2.37<H>    Ca    8.1<L>      06 Nov 2020 06:20  Phos  3.4     11-06  Mg     2.0     11-06    TPro  5.1<L>  /  Alb  2.8<L>  /  TBili  9.2<H>  /  DBili  x   /  AST  233<H>  /  ALT  99<H>  /  AlkPhos  96  11-06      Procalcitonin, Serum (12.16.20 @ 19:11)   Procalcitonin, Serum: 0.98:     Vancomycin Level, Trough (11.07.20 @ 21:49)   Vancomycin Level, Trough: 16.1:     Vancomycin Level, Trough (11.07.20 @ 07:08) : 19.5  Vancomycin Level, Trough (11.06.20 @ 06:20) : 19.9:   Procalcitonin, Serum (11.28.20 @ 15:38) : 1.55:       MEDICATIONS  (STANDING):    ascorbic acid 500 milliGRAM(s) Oral two times a day  chlorhexidine 2% Cloths 1 Application(s) Topical daily  collagenase Ointment 1 Application(s) Topical daily  enoxaparin Injectable 60 milliGRAM(s) SubCutaneous two times a day  fluconAZOLE   Tablet 200 milliGRAM(s) Oral daily  furosemide    Tablet 20 milliGRAM(s) Oral daily  lactobacillus acidophilus 1 Tablet(s) Oral daily  linezolid    Tablet 600 milliGRAM(s) Oral every 12 hours  multivitamin/minerals 1 Tablet(s) Oral daily  nystatin Powder 1 Application(s) Topical two times a day  pantoprazole    Tablet 40 milliGRAM(s) Oral before breakfast  rifAXIMin 550 milliGRAM(s) Oral two times a day  zinc sulfate 220 milliGRAM(s) Oral daily        RADIOLOGY & ADDITIONAL TESTS:    12/9/20 : Xray Chest 1 View- PORTABLE-Routine (Xray Chest 1 View- PORTABLE-Routine in AM.) (12.09.20 @ 11:54) >  IMPRESSION: Persistent effusions left greater than right.      11/27/20 : Xray Chest 1 View- PORTABLE-Urgent (Xray Chest 1 View- PORTABLE-Urgent .) (11.27.20 @ 06:53) Increased interstitial lung markings with bilateral consolidations right greater than left. Small right pleural effusion. Overall worsening compared to prior exam.      11/24/20 : MR Pelvis Bony Only No Cont (11.24.20 @ 18:02) : Osteomyelitis of the lower sacrum and coccyx with overlying cutaneous ulceration,      11/19/20 : Xray Chest 1 View- PORTABLE-Urgent (Xray Chest 1 View- PORTABLE-Urgent .) (11.19.20 @ 23:53): Improvement in bilateral airspace disease with persistent bibasilar consolidation and small effusions.    11/4/20 : Xray Chest 1 View- PORTABLE-Routine (Xray Chest 1 View- PORTABLE-Routine in AM.) (11.04.20 @ 10:59) Reexpansion of the left lung with residual left pleural effusion and/or infiltrate.  Right pulmonary edema unchanged.  Tubes and catheters in satisfactory position.      10/25/20: Xray Chest 1 View- PORTABLE-Routine (Xray Chest 1 View- PORTABLE-Routine in AM.) (10.25.20 @ 09:21) Frontal expiratory view of the chest shows the heart to be similar in size. Endotracheal tube, right jugular line and feeding tube remain present. The lungs show similar left upper lobe infiltrate with progression of right perihilar infiltrate. Pleural effusions are similar. There is no evidence of pneumothorax.    10/21/20 : CT Abdomen and Pelvis w/ Oral Cont and w/ IV Cont (10.21.20 @ 15:59) Mural thickening of the left colon and rectum. Liquid stool in the colon. Apparent segmental mural thickening of the mid to distal small bowel and the proximal duodenum. Findings may represent nonspecific enterocolitis, noninfectious inflammatory bowel disease, or ischemic bowel. Clinical correlation is recommended. The celiac axis artery, SMA, and KINA are patent without stenosis.    Dilatation of the mid small bowel is again noted. Oral contrast has reached the terminal ileum. Findings may represent small bowel obstruction, or ileus related to nonspecific enterocolitis.   Cholelithiasis. Moderate to large ascites in the abdomen, increased since the previous examination. 5 mm nonspecific noncalcified left upper lobe lung nodule; if the patient's is in the high risk category (i.e. smoker), follow-up chest CT may be pursued in 12 months to ensure stability. Combination of atelectasis and consolidation in the left lower lobe.  Possible 1.0 cm hypodense lesion in the left lobe of the thyroid. Thyroid ultrasound may be pursued for further evaluation.   Mild bilateral pleural effusions.  Aging determinate compression fracture at T5 vertebra.        MICROBIOLOGY DATA:  MRSA/MSSA PCR (12.01.20 @ 01:16)   MRSA PCR Result.: Detected:    MRSA/MSSA PCR (11.28.20 @ 11:34)   MRSA PCR Result.: Detected:     Urine Microscopic-Add On (NC) (11.20.20 @ 04:12)   Red Blood Cell - Urine: 5-10 /HPF   White Blood Cell - Urine: 11-25 /HPF   Calcium Oxalate Crystals: Few /HPF   Bacteria: Moderate /HPF   Comment - Urine: few amorphous urates   Epithelial Cells: Few /HPF     Culture - Blood (11.19.20 @ 10:19)   Specimen Source: .Blood Blood-Peripheral   Culture Results: No growth to date.     Culture - Surgical Swab (11.12.20 @ 05:01)   - Ampicillin: R >8 Predicts results to ampicillin/sulbactam, amoxacillin-clavulanate and piperacillin-tazobactam.   - Levofloxacin: R >4   - Linezolid: S 1   - Tetra/Doxy: R >8   - Vancomycin: R >16   Specimen Source: .Surgical Swab Sacral decub   Culture Results:   Few Enterococcus faecium (vancomycin resistant)   Rare Candida albicans "Susceptibilities not performed"   Organism Identification: Enterococcus faecium (vancomycin resistant)   Organism: Enterococcus faecium (vancomycin resistant)   Method Type: STEWART     Culture - Surgical Swab (11.12.20 @ 05:01)   Specimen Source: .Surgical Swab Sacral decub   Culture Results:  Few Enterococcus faecium Susceptibility to follow.   Rare Candida albicans "Susceptibilities not performed"     Culture - Sputum . (10.26.20 @ 00:26)   - Ampicillin/Sulbactam: R <=8/4   - Cefazolin: R >16   - Ceftazidime: I 16   - Clindamycin: R >4   - Erythromycin: R >4   - Gentamicin: S <=1 Should not be used as monotherapy   - Levofloxacin: S <=0.5   - Linezolid: S 2   - Oxacillin: R >2   - Penicillin: R >8   - RIF- Rifampin: S <=1 Should not be used as monotherapy   - Tetra/Doxy: S <=1   - Trimethoprim/Sulfamethoxazole: S <=0.5/9.5   - Trimethoprim/Sulfamethoxazole: S <=0.5/9.5   - Vancomycin: S 1     MRSA/MSSA PCR (10.21.20 @ 09:41)   MRSA PCR Result.: Detected:       Culture - Blood (10.20.20 @ 22:09)   Specimen Source: .Blood Blood   Culture Results:  No growth to date.     Culture - Blood (10.20.20 @ 22:09)   Specimen Source: .Blood Blood   Culture Results:   No growth to date.     Culture - Blood in AM (10.16.20 @ 10:13)   Specimen Source: .Blood Blood-Peripheral   Culture Results: No growth to date.     Culture - Blood in AM (10.16.20 @ 10:13)   Specimen Source: .Blood Blood-Peripheral   Culture Results: No growth to date.     Culture - Blood (10.13.20 @ 22:23)   Growth in anaerobic bottle: Gram Negative Rods   Specimen Source: .Blood Blood-Peripheral   Organism: Blood Culture PCR   Culture Results: Growth in anaerobic bottle: Bacteroides fragilis   "Susceptibilities not performed"     Culture - Blood (10.13.20 @ 22:23)   Specimen Source: .Blood Blood-Peripheral   Culture Results:   No growth to date.

## 2020-12-20 NOTE — PROGRESS NOTE ADULT - PROBLEM SELECTOR PLAN 5
of chronic disease, H&H unchanged  Continue to follow. Continue wound care.  turn every 2 hours  Continue antibiotics as per Dr Patricio. Needs antibiotics thru 12/29.   OOB daily.

## 2020-12-21 NOTE — PROGRESS NOTE ADULT - SUBJECTIVE AND OBJECTIVE BOX
MARIBETH PADILLA    SCU progress note    INTERVAL HPI/OVERNIGHT EVENTS: ***Tmax 100.8 this morning. +UA patient was placed on Ceftriaxone     DNR [x ]   DNI  [  ]  TRIAL OF INTUBATION    Covid - 19 PCR: Negative     The 4Ms    What Matters Most: see [facility]  Age appropriate Medications/Screen for High Risk Medication: Yes  Mentation: see CAM below  Mobility: defer to physical exam    The Confusion Assessment Method (CAM) Diagnostic Algorithm     1: Acute Onset or Fluctuating Course  - Is there evidence of an acute change in mental status from the patient’s baseline? Did the (abnormal) behavior  fluctuate during the day, that is, tend to come and go, or increase and decrease in severity?  [ ] YES [x ] NO     2: Inattention  - Did the patient have difficulty focusing attention, being easily distractible, or having difficulty keeping track of what was being said?  [ ] YES [x ] NO     3: Disorganized thinking  -Was the patient’s thinking disorganized or incoherent, such as rambling or irrelevant conversation, unclear or illogical flow of ideas, or unpredictable switching from subject to subject?  [ ] YES [x ] NO    4: Altered Level of consciousness?  [ ] YES [x ] NO    The diagnosis of delirium by CAM requires the presence of features 1 and 2 and either 3 or 4.    PRESSORS: [ ] YES [x ] NO  cefTRIAXone   IVPB      fluconAZOLE   Tablet 200 milliGRAM(s) Oral daily  linezolid    Tablet 600 milliGRAM(s) Oral every 12 hours    Cardiovascular:  Heart Failure  Acute   Acute on Chronic  Chronic       furosemide    Tablet 20 milliGRAM(s) Oral daily    Pulmonary:  ALBUTerol    90 MICROgram(s) HFA Inhaler 2 Puff(s) Inhalation every 6 hours PRN    Hematalogic:  enoxaparin Injectable 60 milliGRAM(s) SubCutaneous two times a day    Other:  acetaminophen  Suppository .. 650 milliGRAM(s) Rectal every 6 hours PRN  ascorbic acid 500 milliGRAM(s) Oral two times a day  chlorhexidine 2% Cloths 1 Application(s) Topical daily  collagenase Ointment 1 Application(s) Topical daily  lactobacillus acidophilus 1 Tablet(s) Oral daily  multivitamin/minerals 1 Tablet(s) Oral daily  nystatin Powder 1 Application(s) Topical two times a day  pantoprazole    Tablet 40 milliGRAM(s) Oral before breakfast  sodium chloride 0.9% lock flush 10 milliLiter(s) IV Push every 1 hour PRN  zinc sulfate 220 milliGRAM(s) Oral daily    acetaminophen  Suppository .. 650 milliGRAM(s) Rectal every 6 hours PRN  ALBUTerol    90 MICROgram(s) HFA Inhaler 2 Puff(s) Inhalation every 6 hours PRN  ascorbic acid 500 milliGRAM(s) Oral two times a day  cefTRIAXone   IVPB      chlorhexidine 2% Cloths 1 Application(s) Topical daily  collagenase Ointment 1 Application(s) Topical daily  enoxaparin Injectable 60 milliGRAM(s) SubCutaneous two times a day  fluconAZOLE   Tablet 200 milliGRAM(s) Oral daily  furosemide    Tablet 20 milliGRAM(s) Oral daily  lactobacillus acidophilus 1 Tablet(s) Oral daily  linezolid    Tablet 600 milliGRAM(s) Oral every 12 hours  multivitamin/minerals 1 Tablet(s) Oral daily  nystatin Powder 1 Application(s) Topical two times a day  pantoprazole    Tablet 40 milliGRAM(s) Oral before breakfast  sodium chloride 0.9% lock flush 10 milliLiter(s) IV Push every 1 hour PRN  zinc sulfate 220 milliGRAM(s) Oral daily    Drug Dosing Weight  Height (cm): 167.6 (21 Oct 2020 02:26)  Weight (kg): 56.2 (21 Oct 2020 02:26)  BMI (kg/m2): 20 (21 Oct 2020 02:26)  BSA (m2): 1.63 (21 Oct 2020 02:26)    CENTRAL LINE: [ ] YES [x ] NO  LOCATION:   DATE INSERTED:  REMOVE: [ ] YES [ ] NO  EXPLAIN:    TREJO: [ ] YES [x ] NO    DATE INSERTED:  REMOVE:  [ ] YES [ ] NO  EXPLAIN:    PAST MEDICAL & SURGICAL HISTORY:  Anasarca    Pulmonary embolism    DVT (deep venous thrombosis)    SBO (small bowel obstruction)    H/O exploratory laparotomy            PHYSICAL EXAM:    GENERAL: NAD, cachectic  HEAD:  Atraumatic, Normocephalic  EYES: EOMI, PERRLA, conjunctiva and sclera clear  ENMT: No tonsillar erythema, exudates  NECK: Supple, No JVD  NERVOUS SYSTEM:  Alert & Oriented X3, Follows commands, moving all extremities  CHEST/LUNG: Diminished breath sounds bilateral bases  HEART: Regular rate and rhythm; No murmurs, rubs, or gallops  ABDOMEN: Soft, Nontender, Nondistended; Bowel sounds present  EXTREMITIES:  2+ Peripheral Pulses, No clubbing, cyanosis, or edema  LYMPH: No lymphadenopathy noted  SKIN: sacral decubitus approximately 3.5" in diameter, parts of outer area w/ slough, center of wound healing w/ pink healthy tissue.          LABS:  CBC Full  -  ( 21 Dec 2020 06:47 )  WBC Count : 10.92 K/uL  RBC Count : 2.59 M/uL  Hemoglobin : 7.3 g/dL  Hematocrit : 24.3 %  Platelet Count - Automated : 239 K/uL  Mean Cell Volume : 93.8 fl  Mean Cell Hemoglobin : 28.2 pg  Mean Cell Hemoglobin Concentration : 30.0 gm/dL  Auto Neutrophil # : x  Auto Lymphocyte # : x  Auto Monocyte # : x  Auto Eosinophil # : x  Auto Basophil # : x  Auto Neutrophil % : x  Auto Lymphocyte % : x  Auto Monocyte % : x  Auto Eosinophil % : x  Auto Basophil % : x    12-    143  |  104  |  15  ----------------------------<  88  3.5   |  32<H>  |  0.76    Ca    8.4      21 Dec 2020 06:47    TPro  6.7  /  Alb  1.8<L>  /  TBili  1.3<H>  /  DBili  x   /  AST  30  /  ALT  23  /  AlkPhos  262<H>  12-      Urinalysis Basic - ( 20 Dec 2020 14:44 )    Color: Yellow / Appearance: Clear / S.010 / pH: x  Gluc: x / Ketone: Trace  / Bili: Negative / Urobili: Negative   Blood: x / Protein: 30 mg/dL / Nitrite: Negative   Leuk Esterase: Trace / RBC: 2-5 /HPF / WBC 6-10 /HPF   Sq Epi: x / Non Sq Epi: Moderate /HPF / Bacteria: Many /HPF            [  ]  DVT Prophylaxis  [  ]  Nutrition, Brand, Rate         Goal Rate        Abnormal Nutritional Status -  Malnutrition   Cachexia          RADIOLOGY & ADDITIONAL STUDIES:  ***  < from: Xray Chest 1 View- PORTABLE-Routine (Xray Chest 1 View- PORTABLE-Routine .) (20 @ 10:15) >  Low lung volumes. No change heart mediastinum. Bibasilar atelectasis/airspace disease and small effusions without significant change. No pneumothorax. Distended bowel visualized upper abdomen similar to prior. No new abnormality.    Impression: Essentially stable exam      < end of copied text >    Goals of Care Discussion with Family/Proxy/Other   - see note from

## 2020-12-21 NOTE — PROGRESS NOTE ADULT - PROBLEM SELECTOR PLAN 2
Plan: Echo shows improved EF   s/p IV Lasix 11/27 for pleural effusions;   Currently appears euvolemic  Lasix po 20mg with hold parameters   Continue supportive care.

## 2020-12-21 NOTE — PROGRESS NOTE ADULT - PROBLEM SELECTOR PLAN 5
Continue wound care.  turn every 2 hours  Continue antibiotics as per Dr Patricio. Needs antibiotics thru 12/29.   OOB daily.   Does not require surgical intervention at this time.

## 2020-12-21 NOTE — PROGRESS NOTE ADULT - ASSESSMENT
Patient is a 65y old  Female from home, lives with daughter, ambulates with walker with PMH of recurrent SBO's, s/p exp lap, SB resection in 2015, ex lap, ALBINA in 2018, DVT, PE, on Xarelto, IVC filter, chronic leg swelling, and anasarca, Now send in to the ER by her PCP, Dr. Ross, for evaluation of  generalized weakness and hypotension BP of 80/40 during office visit. On admission, she found to have no fever and BP was in lower normal range and no Leukocytosis. The CXR is clear and CT abd/pelvis consistent with Ileus. The ID consult requested to assist with evaluation of infectious etiology of episode of hypotension. Found to have Bacteroides bacteremia and now developed septic shock on Cefepime and Flagyl. Hence transferred to ICU. 10/20/20.    # Hypotension  at office- BP of 80/40 - resolved   #  Bacteroides fragilis Bacteremia - 10/13/20 - ? source most likely GI- Repeat Blood Cxs have NGTD 10/16/20  # Ileus  - h/o recurrent SBO and s/p EXp. LAp  # COVID 19 negative   # Septic shock ( Hypothermia + hypotensive)- transferred to ICU since requiring pressor- source GI, The CT abd/pelvis shows nonspecific enterocolitis, noninfectious inflammatory bowel disease, or ischemic bowel, along with ileus.   # Pneumonia- HCAP vs Aspiration - on CXR 10/24 and CT chest 10/21- sputum Cx grew Methicillin resistant Staphylococcus aureus  and Stenotrophomonas maltophilia.  # S/p extubated 11/9/20  # Infected sacral ulcer- s/p debridement and culture grew Enterococcus, VRE and Candida, sensitivity is pending- The MRI of pelvis shows  Osteomyelitis of the lower sacrum and coccyx with overlying cutaneous ulceration.   # Fever- 11/8 and 11/19- BCX NGTD 11/19 - The CXR shows Improvement in bilateral airspace disease with persistent bibasilar consolidation. She is s/p removal of velazquez catheter and passed TOV.  # HCAP- 11/27/20,  Intermittent fever and CXR shows  bilateral consolidations right greater than left. - The procalcitonin level is 1.55  # s/p PICC line placement 11/30  # Persistent effusions left greater than right      would recommend:    1. Monitor Temp. and c/w supportive care   2. Continue Sacral wound care as per Surgery   3. Continue oral Linezolid  and oral fluconazole until  12/29/20  4. Aspiration precaution  5. Frequent repositioning   6. OOB to chair        Attending Attestation:    Spent more than 35 minutes on total encounter, more than 50 % of the visit was spent counseling and/or coordinating care by the Attending physician.   Patient is a 65y old  Female from home, lives with daughter, ambulates with walker with PMH of recurrent SBO's, s/p exp lap, SB resection in 2015, ex lap, ALBINA in 2018, DVT, PE, on Xarelto, IVC filter, chronic leg swelling, and anasarca, Now send in to the ER by her PCP, Dr. Ross, for evaluation of  generalized weakness and hypotension BP of 80/40 during office visit. On admission, she found to have no fever and BP was in lower normal range and no Leukocytosis. The CXR is clear and CT abd/pelvis consistent with Ileus. The ID consult requested to assist with evaluation of infectious etiology of episode of hypotension. Found to have Bacteroides bacteremia and now developed septic shock on Cefepime and Flagyl. Hence transferred to ICU. 10/20/20.    # Hypotension  at office- BP of 80/40 - resolved   #  Bacteroides fragilis Bacteremia - 10/13/20 - ? source most likely GI- Repeat Blood Cxs have NGTD 10/16/20  # Ileus  - h/o recurrent SBO and s/p EXp. LAp  # COVID 19 negative   # Septic shock ( Hypothermia + hypotensive)- transferred to ICU since requiring pressor- source GI, The CT abd/pelvis shows nonspecific enterocolitis, noninfectious inflammatory bowel disease, or ischemic bowel, along with ileus.   # Pneumonia- HCAP vs Aspiration - on CXR 10/24 and CT chest 10/21- sputum Cx grew Methicillin resistant Staphylococcus aureus  and Stenotrophomonas maltophilia.  # S/p extubated 11/9/20  # Infected sacral ulcer- s/p debridement and culture grew Enterococcus, VRE and Candida, sensitivity is pending- The MRI of pelvis shows  Osteomyelitis of the lower sacrum and coccyx with overlying cutaneous ulceration.   # Fever- 11/8 and 11/19- BCX NGTD 11/19 - The CXR shows Improvement in bilateral airspace disease with persistent bibasilar consolidation. She is s/p removal of velazquez catheter and passed TOV.  # HCAP- 11/27/20,  Intermittent fever and CXR shows  bilateral consolidations right greater than left. - The procalcitonin level is 1.55  # s/p PICC line placement 11/30  # Persistent effusions left greater than right      would recommend:    1. Follow up Blood cultures  2. Monitor Temp. and c/w supportive care   3. Continue Sacral wound care as per Surgery   4. Continue oral Linezolid  and oral fluconazole until  12/29/20  5. Aspiration precaution  6. Frequent repositioning   7. OOB to chair      d/w Covering NPCammie     Attending Attestation:    Spent more than 35 minutes on total encounter, more than 50 % of the visit was spent counseling and/or coordinating care by the Attending physician.

## 2020-12-21 NOTE — PROGRESS NOTE ADULT - SUBJECTIVE AND OBJECTIVE BOX
Patient is seen and examined at the bed side,  is afebrile now, still having intermittent fever.       REVIEW OF SYSTEMS: All other review systems are negative      ALLERGIES: No Known Allergies      Vital Signs Last 24 Hrs  T(C): 36.2 (21 Dec 2020 13:29), Max: 38.2 (21 Dec 2020 09:00)  T(F): 97.2 (21 Dec 2020 13:29), Max: 100.8 (21 Dec 2020 09:00)  HR: 102 (21 Dec 2020 13:29) (100 - 104)  BP: 97/62 (21 Dec 2020 13:29) (91/56 - 100/64)  BP(mean): --  RR: 17 (21 Dec 2020 13:29) (17 - 18)  SpO2: 99% (21 Dec 2020 13:29) (98% - 100%)      PHYSICAL EXAM:  GENERAL: Not in distress, off oxygen   CHEST/LUNG: Not using accessory muscles   HEART: s1 and s2 present  ABDOMEN:  Nontender and  Nondistended  EXTREMITIES: B/L UE edematous improved  CNS: Awake and Alert         LABS:                        7.3    10.92 )-----------( 239      ( 21 Dec 2020 06:47 )             24.3                  9.4    13.14 )-----------( 149      ( 14 Nov 2020 07:58 )             28.7         12-21    143  |  104  |  15  ----------------------------<  88  3.5   |  32<H>  |  0.76    Ca    8.4      21 Dec 2020 06:47    TPro  6.7  /  Alb  1.8<L>  /  TBili  1.3<H>  /  DBili  x   /  AST  30  /  ALT  23  /  AlkPhos  262<H>  12-21      11-06    138  |  104  |  37<H>  ----------------------------<  81  3.9   |  25  |  2.37<H>    Ca    8.1<L>      06 Nov 2020 06:20  Phos  3.4     11-06  Mg     2.0     11-06    TPro  5.1<L>  /  Alb  2.8<L>  /  TBili  9.2<H>  /  DBili  x   /  AST  233<H>  /  ALT  99<H>  /  AlkPhos  96  11-06      Procalcitonin, Serum (12.16.20 @ 19:11)   Procalcitonin, Serum: 0.98:     Vancomycin Level, Trough (11.07.20 @ 21:49)   Vancomycin Level, Trough: 16.1:     Vancomycin Level, Trough (11.07.20 @ 07:08) : 19.5  Vancomycin Level, Trough (11.06.20 @ 06:20) : 19.9:   Procalcitonin, Serum (11.28.20 @ 15:38) : 1.55:       MEDICATIONS  (STANDING):    ascorbic acid 500 milliGRAM(s) Oral two times a day  chlorhexidine 2% Cloths 1 Application(s) Topical daily  collagenase Ointment 1 Application(s) Topical daily  enoxaparin Injectable 60 milliGRAM(s) SubCutaneous two times a day  fluconAZOLE   Tablet 200 milliGRAM(s) Oral daily  furosemide    Tablet 20 milliGRAM(s) Oral daily  lactobacillus acidophilus 1 Tablet(s) Oral daily  linezolid    Tablet 600 milliGRAM(s) Oral every 12 hours  multivitamin/minerals 1 Tablet(s) Oral daily  nystatin Powder 1 Application(s) Topical two times a day  pantoprazole    Tablet 40 milliGRAM(s) Oral before breakfast  zinc sulfate 220 milliGRAM(s) Oral daily        RADIOLOGY & ADDITIONAL TESTS:    12/9/20 : Xray Chest 1 View- PORTABLE-Routine (Xray Chest 1 View- PORTABLE-Routine in AM.) (12.09.20 @ 11:54) >  IMPRESSION: Persistent effusions left greater than right.      11/27/20 : Xray Chest 1 View- PORTABLE-Urgent (Xray Chest 1 View- PORTABLE-Urgent .) (11.27.20 @ 06:53) Increased interstitial lung markings with bilateral consolidations right greater than left. Small right pleural effusion. Overall worsening compared to prior exam.      11/24/20 : MR Pelvis Bony Only No Cont (11.24.20 @ 18:02) : Osteomyelitis of the lower sacrum and coccyx with overlying cutaneous ulceration,      11/19/20 : Xray Chest 1 View- PORTABLE-Urgent (Xray Chest 1 View- PORTABLE-Urgent .) (11.19.20 @ 23:53): Improvement in bilateral airspace disease with persistent bibasilar consolidation and small effusions.    11/4/20 : Xray Chest 1 View- PORTABLE-Routine (Xray Chest 1 View- PORTABLE-Routine in AM.) (11.04.20 @ 10:59) Reexpansion of the left lung with residual left pleural effusion and/or infiltrate.  Right pulmonary edema unchanged.  Tubes and catheters in satisfactory position.      10/25/20: Xray Chest 1 View- PORTABLE-Routine (Xray Chest 1 View- PORTABLE-Routine in AM.) (10.25.20 @ 09:21) Frontal expiratory view of the chest shows the heart to be similar in size. Endotracheal tube, right jugular line and feeding tube remain present. The lungs show similar left upper lobe infiltrate with progression of right perihilar infiltrate. Pleural effusions are similar. There is no evidence of pneumothorax.    10/21/20 : CT Abdomen and Pelvis w/ Oral Cont and w/ IV Cont (10.21.20 @ 15:59) Mural thickening of the left colon and rectum. Liquid stool in the colon. Apparent segmental mural thickening of the mid to distal small bowel and the proximal duodenum. Findings may represent nonspecific enterocolitis, noninfectious inflammatory bowel disease, or ischemic bowel. Clinical correlation is recommended. The celiac axis artery, SMA, and KINA are patent without stenosis.    Dilatation of the mid small bowel is again noted. Oral contrast has reached the terminal ileum. Findings may represent small bowel obstruction, or ileus related to nonspecific enterocolitis.   Cholelithiasis. Moderate to large ascites in the abdomen, increased since the previous examination. 5 mm nonspecific noncalcified left upper lobe lung nodule; if the patient's is in the high risk category (i.e. smoker), follow-up chest CT may be pursued in 12 months to ensure stability. Combination of atelectasis and consolidation in the left lower lobe.  Possible 1.0 cm hypodense lesion in the left lobe of the thyroid. Thyroid ultrasound may be pursued for further evaluation.   Mild bilateral pleural effusions.  Aging determinate compression fracture at T5 vertebra.        MICROBIOLOGY DATA:  MRSA/MSSA PCR (12.01.20 @ 01:16)   MRSA PCR Result.: Detected:    MRSA/MSSA PCR (11.28.20 @ 11:34)   MRSA PCR Result.: Detected:     Urine Microscopic-Add On (NC) (11.20.20 @ 04:12)   Red Blood Cell - Urine: 5-10 /HPF   White Blood Cell - Urine: 11-25 /HPF   Calcium Oxalate Crystals: Few /HPF   Bacteria: Moderate /HPF   Comment - Urine: few amorphous urates   Epithelial Cells: Few /HPF     Culture - Blood (11.19.20 @ 10:19)   Specimen Source: .Blood Blood-Peripheral   Culture Results: No growth to date.     Culture - Surgical Swab (11.12.20 @ 05:01)   - Ampicillin: R >8 Predicts results to ampicillin/sulbactam, amoxacillin-clavulanate and piperacillin-tazobactam.   - Levofloxacin: R >4   - Linezolid: S 1   - Tetra/Doxy: R >8   - Vancomycin: R >16   Specimen Source: .Surgical Swab Sacral decub   Culture Results:   Few Enterococcus faecium (vancomycin resistant)   Rare Candida albicans "Susceptibilities not performed"   Organism Identification: Enterococcus faecium (vancomycin resistant)   Organism: Enterococcus faecium (vancomycin resistant)   Method Type: STEWART     Culture - Surgical Swab (11.12.20 @ 05:01)   Specimen Source: .Surgical Swab Sacral decub   Culture Results:  Few Enterococcus faecium Susceptibility to follow.   Rare Candida albicans "Susceptibilities not performed"     Culture - Sputum . (10.26.20 @ 00:26)   - Ampicillin/Sulbactam: R <=8/4   - Cefazolin: R >16   - Ceftazidime: I 16   - Clindamycin: R >4   - Erythromycin: R >4   - Gentamicin: S <=1 Should not be used as monotherapy   - Levofloxacin: S <=0.5   - Linezolid: S 2   - Oxacillin: R >2   - Penicillin: R >8   - RIF- Rifampin: S <=1 Should not be used as monotherapy   - Tetra/Doxy: S <=1   - Trimethoprim/Sulfamethoxazole: S <=0.5/9.5   - Trimethoprim/Sulfamethoxazole: S <=0.5/9.5   - Vancomycin: S 1     MRSA/MSSA PCR (10.21.20 @ 09:41)   MRSA PCR Result.: Detected:       Culture - Blood (10.20.20 @ 22:09)   Specimen Source: .Blood Blood   Culture Results:  No growth to date.     Culture - Blood (10.20.20 @ 22:09)   Specimen Source: .Blood Blood   Culture Results:   No growth to date.     Culture - Blood in AM (10.16.20 @ 10:13)   Specimen Source: .Blood Blood-Peripheral   Culture Results: No growth to date.     Culture - Blood in AM (10.16.20 @ 10:13)   Specimen Source: .Blood Blood-Peripheral   Culture Results: No growth to date.     Culture - Blood (10.13.20 @ 22:23)   Growth in anaerobic bottle: Gram Negative Rods   Specimen Source: .Blood Blood-Peripheral   Organism: Blood Culture PCR   Culture Results: Growth in anaerobic bottle: Bacteroides fragilis   "Susceptibilities not performed"     Culture - Blood (10.13.20 @ 22:23)   Specimen Source: .Blood Blood-Peripheral   Culture Results:   No growth to date.                  Patient is seen and examined at the bed side,  is afebrile now, still having intermittent fever. The WBC count and  creatinine is within normal limit.       REVIEW OF SYSTEMS: All other review systems are negative      ALLERGIES: No Known Allergies      Vital Signs Last 24 Hrs  T(C): 36.2 (21 Dec 2020 13:29), Max: 38.2 (21 Dec 2020 09:00)  T(F): 97.2 (21 Dec 2020 13:29), Max: 100.8 (21 Dec 2020 09:00)  HR: 102 (21 Dec 2020 13:29) (100 - 104)  BP: 97/62 (21 Dec 2020 13:29) (91/56 - 100/64)  BP(mean): --  RR: 17 (21 Dec 2020 13:29) (17 - 18)  SpO2: 99% (21 Dec 2020 13:29) (98% - 100%)      PHYSICAL EXAM:  GENERAL: Not in distress, off oxygen   CHEST/LUNG: Not using accessory muscles   HEART: s1 and s2 present  ABDOMEN:  Nontender and  Nondistended  EXTREMITIES: B/L UE edematous improved  CNS: Awake and Alert         LABS:                        7.3    10.92 )-----------( 239      ( 21 Dec 2020 06:47 )             24.3                  9.4    13.14 )-----------( 149      ( 14 Nov 2020 07:58 )             28.7         12-21    143  |  104  |  15  ----------------------------<  88  3.5   |  32<H>  |  0.76    Ca    8.4      21 Dec 2020 06:47    TPro  6.7  /  Alb  1.8<L>  /  TBili  1.3<H>  /  DBili  x   /  AST  30  /  ALT  23  /  AlkPhos  262<H>  12-21      11-06    138  |  104  |  37<H>  ----------------------------<  81  3.9   |  25  |  2.37<H>    Ca    8.1<L>      06 Nov 2020 06:20  Phos  3.4     11-06  Mg     2.0     11-06    TPro  5.1<L>  /  Alb  2.8<L>  /  TBili  9.2<H>  /  DBili  x   /  AST  233<H>  /  ALT  99<H>  /  AlkPhos  96  11-06      Procalcitonin, Serum (12.16.20 @ 19:11)   Procalcitonin, Serum: 0.98:     Vancomycin Level, Trough (11.07.20 @ 21:49)   Vancomycin Level, Trough: 16.1:     Vancomycin Level, Trough (11.07.20 @ 07:08) : 19.5  Vancomycin Level, Trough (11.06.20 @ 06:20) : 19.9:   Procalcitonin, Serum (11.28.20 @ 15:38) : 1.55:       MEDICATIONS  (STANDING):    ascorbic acid 500 milliGRAM(s) Oral two times a day  chlorhexidine 2% Cloths 1 Application(s) Topical daily  collagenase Ointment 1 Application(s) Topical daily  enoxaparin Injectable 60 milliGRAM(s) SubCutaneous two times a day  fluconAZOLE   Tablet 200 milliGRAM(s) Oral daily  furosemide    Tablet 20 milliGRAM(s) Oral daily  lactobacillus acidophilus 1 Tablet(s) Oral daily  linezolid    Tablet 600 milliGRAM(s) Oral every 12 hours  multivitamin/minerals 1 Tablet(s) Oral daily  nystatin Powder 1 Application(s) Topical two times a day  pantoprazole    Tablet 40 milliGRAM(s) Oral before breakfast  zinc sulfate 220 milliGRAM(s) Oral daily        RADIOLOGY & ADDITIONAL TESTS:    12/9/20 : Xray Chest 1 View- PORTABLE-Routine (Xray Chest 1 View- PORTABLE-Routine in AM.) (12.09.20 @ 11:54) >  IMPRESSION: Persistent effusions left greater than right.      11/27/20 : Xray Chest 1 View- PORTABLE-Urgent (Xray Chest 1 View- PORTABLE-Urgent .) (11.27.20 @ 06:53) Increased interstitial lung markings with bilateral consolidations right greater than left. Small right pleural effusion. Overall worsening compared to prior exam.      11/24/20 : MR Pelvis Bony Only No Cont (11.24.20 @ 18:02) : Osteomyelitis of the lower sacrum and coccyx with overlying cutaneous ulceration,      11/19/20 : Xray Chest 1 View- PORTABLE-Urgent (Xray Chest 1 View- PORTABLE-Urgent .) (11.19.20 @ 23:53): Improvement in bilateral airspace disease with persistent bibasilar consolidation and small effusions.    11/4/20 : Xray Chest 1 View- PORTABLE-Routine (Xray Chest 1 View- PORTABLE-Routine in AM.) (11.04.20 @ 10:59) Reexpansion of the left lung with residual left pleural effusion and/or infiltrate.  Right pulmonary edema unchanged.  Tubes and catheters in satisfactory position.      10/25/20: Xray Chest 1 View- PORTABLE-Routine (Xray Chest 1 View- PORTABLE-Routine in AM.) (10.25.20 @ 09:21) Frontal expiratory view of the chest shows the heart to be similar in size. Endotracheal tube, right jugular line and feeding tube remain present. The lungs show similar left upper lobe infiltrate with progression of right perihilar infiltrate. Pleural effusions are similar. There is no evidence of pneumothorax.    10/21/20 : CT Abdomen and Pelvis w/ Oral Cont and w/ IV Cont (10.21.20 @ 15:59) Mural thickening of the left colon and rectum. Liquid stool in the colon. Apparent segmental mural thickening of the mid to distal small bowel and the proximal duodenum. Findings may represent nonspecific enterocolitis, noninfectious inflammatory bowel disease, or ischemic bowel. Clinical correlation is recommended. The celiac axis artery, SMA, and KINA are patent without stenosis.    Dilatation of the mid small bowel is again noted. Oral contrast has reached the terminal ileum. Findings may represent small bowel obstruction, or ileus related to nonspecific enterocolitis.   Cholelithiasis. Moderate to large ascites in the abdomen, increased since the previous examination. 5 mm nonspecific noncalcified left upper lobe lung nodule; if the patient's is in the high risk category (i.e. smoker), follow-up chest CT may be pursued in 12 months to ensure stability. Combination of atelectasis and consolidation in the left lower lobe.  Possible 1.0 cm hypodense lesion in the left lobe of the thyroid. Thyroid ultrasound may be pursued for further evaluation.   Mild bilateral pleural effusions.  Aging determinate compression fracture at T5 vertebra.        MICROBIOLOGY DATA:  MRSA/MSSA PCR (12.01.20 @ 01:16)   MRSA PCR Result.: Detected:    MRSA/MSSA PCR (11.28.20 @ 11:34)   MRSA PCR Result.: Detected:     Urine Microscopic-Add On (NC) (11.20.20 @ 04:12)   Red Blood Cell - Urine: 5-10 /HPF   White Blood Cell - Urine: 11-25 /HPF   Calcium Oxalate Crystals: Few /HPF   Bacteria: Moderate /HPF   Comment - Urine: few amorphous urates   Epithelial Cells: Few /HPF     Culture - Blood (11.19.20 @ 10:19)   Specimen Source: .Blood Blood-Peripheral   Culture Results: No growth to date.     Culture - Surgical Swab (11.12.20 @ 05:01)   - Ampicillin: R >8 Predicts results to ampicillin/sulbactam, amoxacillin-clavulanate and piperacillin-tazobactam.   - Levofloxacin: R >4   - Linezolid: S 1   - Tetra/Doxy: R >8   - Vancomycin: R >16   Specimen Source: .Surgical Swab Sacral decub   Culture Results:   Few Enterococcus faecium (vancomycin resistant)   Rare Candida albicans "Susceptibilities not performed"   Organism Identification: Enterococcus faecium (vancomycin resistant)   Organism: Enterococcus faecium (vancomycin resistant)   Method Type: STEWART     Culture - Surgical Swab (11.12.20 @ 05:01)   Specimen Source: .Surgical Swab Sacral decub   Culture Results:  Few Enterococcus faecium Susceptibility to follow.   Rare Candida albicans "Susceptibilities not performed"     Culture - Sputum . (10.26.20 @ 00:26)   - Ampicillin/Sulbactam: R <=8/4   - Cefazolin: R >16   - Ceftazidime: I 16   - Clindamycin: R >4   - Erythromycin: R >4   - Gentamicin: S <=1 Should not be used as monotherapy   - Levofloxacin: S <=0.5   - Linezolid: S 2   - Oxacillin: R >2   - Penicillin: R >8   - RIF- Rifampin: S <=1 Should not be used as monotherapy   - Tetra/Doxy: S <=1   - Trimethoprim/Sulfamethoxazole: S <=0.5/9.5   - Trimethoprim/Sulfamethoxazole: S <=0.5/9.5   - Vancomycin: S 1     MRSA/MSSA PCR (10.21.20 @ 09:41)   MRSA PCR Result.: Detected:       Culture - Blood (10.20.20 @ 22:09)   Specimen Source: .Blood Blood   Culture Results:  No growth to date.     Culture - Blood (10.20.20 @ 22:09)   Specimen Source: .Blood Blood   Culture Results:   No growth to date.     Culture - Blood in AM (10.16.20 @ 10:13)   Specimen Source: .Blood Blood-Peripheral   Culture Results: No growth to date.     Culture - Blood in AM (10.16.20 @ 10:13)   Specimen Source: .Blood Blood-Peripheral   Culture Results: No growth to date.     Culture - Blood (10.13.20 @ 22:23)   Growth in anaerobic bottle: Gram Negative Rods   Specimen Source: .Blood Blood-Peripheral   Organism: Blood Culture PCR   Culture Results: Growth in anaerobic bottle: Bacteroides fragilis   "Susceptibilities not performed"     Culture - Blood (10.13.20 @ 22:23)   Specimen Source: .Blood Blood-Peripheral   Culture Results:   No growth to date.

## 2020-12-21 NOTE — CHART NOTE - NSCHARTNOTEFT_GEN_A_CORE
EVENT: UTI: Patient urinalysis + for UTI. Patient having low grade fevers but otherwise asymptomatic. Rocephin 1g q24hr x 3 doses ordered/ Urine culture already in process    OBJECTIVE:  Vital Signs Last 24 Hrs  T(C): 37.3 (20 Dec 2020 20:32), Max: 37.3 (20 Dec 2020 20:32)  T(F): 99.2 (20 Dec 2020 20:32), Max: 99.2 (20 Dec 2020 20:32)  HR: 100 (21 Dec 2020 06:19) (95 - 104)  BP: 100/64 (21 Dec 2020 06:19) (91/56 - 105/75)  BP(mean): --  RR: 17 (21 Dec 2020 06:19) (17 - 20)  SpO2: 98% (21 Dec 2020 06:19) (98% - 100%)      LABS:  urUrine Microscopic-Add On (NC) (12.20.20 @ 14:44)   White Blood Cell - Urine: 6-10 /HPF   Red Blood Cell - Urine: 2-5 /HPF   Comment - Urine: Few Amorphous Urates, Few Mucus Strands   Bacteria: Many /HPF   Epithelial Cells: Moderate /HPF   Granular Cast: 0-2 /LPF                             8.0    8.37  )-----------( 269      ( 20 Dec 2020 06:38 )             26.5     12-20    145  |  108  |  12  ----------------------------<  79  3.9   |  32<H>  |  0.71    Ca    8.6      20 Dec 2020 06:38        ASSESSMENT:  HPI:  65y Female from home, lives with daughter, ambulates with walker with PMH of recurrent SBO's, s/p exp lap, SB resection in 2015, ex lap, ALBINA in 2018, DVT, PE, on Xarelto, IVC filter, chronic leg swelling, and anasarca, came in to the ED after being sent by PCP Dr. Ross for generalized weakness and hypotension during office visit. Patient was seen by PCP for weakness and chills earlier today and was sent to the ED after she was noted to be hypotensive with BP 80/40. Patient denies fever, SOB, palpitations, changes to her bowel or urinary habits, abdominal pain, urinary symptoms or any other acute complaints.    In ED, /71, HR 86 (13 Oct 2020 17:53)      PLAN: Fever likely 2/2 to UTI  -Ceftriaxone 1g q24 x 3 days  - Follow-up urine culture    FOLLOW UP / RESULT:

## 2020-12-21 NOTE — PROGRESS NOTE ADULT - PROBLEM SELECTOR PLAN 4
chronic disease, H&H unchanged  Continue to follow.  H&H noted- no over sign of bleeding.    Follow AM CBC.

## 2020-12-21 NOTE — PROGRESS NOTE ADULT - SUBJECTIVE AND OBJECTIVE BOX
Patient was seen and examined  Patient is a 65y old  Female who presents with a chief complaint of Hypotension (21 Dec 2020 17:36)      INTERVAL HPI/OVERNIGHT EVENTS:  T(C): 36.2 (20 @ 13:29), Max: 38.2 (20 @ 09:00)  HR: 102 (20 @ 13:29) (100 - 104)  BP: 97/62 (20 @ 13:29) (91/56 - 100/64)  RR: 17 (20 @ 13:29) (17 - 18)  SpO2: 99% (20 @ 13:29) (98% - 100%)  Wt(kg): --  I&O's Summary      LABS:                        7.3    10.92 )-----------( 239      ( 21 Dec 2020 06:47 )             24.3         143  |  104  |  15  ----------------------------<  88  3.5   |  32<H>  |  0.76    Ca    8.4      21 Dec 2020 06:47    TPro  6.7  /  Alb  1.8<L>  /  TBili  1.3<H>  /  DBili  x   /  AST  30  /  ALT  23  /  AlkPhos  262<H>        Urinalysis Basic - ( 20 Dec 2020 14:44 )    Color: Yellow / Appearance: Clear / S.010 / pH: x  Gluc: x / Ketone: Trace  / Bili: Negative / Urobili: Negative   Blood: x / Protein: 30 mg/dL / Nitrite: Negative   Leuk Esterase: Trace / RBC: 2-5 /HPF / WBC 6-10 /HPF   Sq Epi: x / Non Sq Epi: Moderate /HPF / Bacteria: Many /HPF      CAPILLARY BLOOD GLUCOSE      POCT Blood Glucose.: 98 mg/dL (21 Dec 2020 07:39)          Urinalysis Basic - ( 20 Dec 2020 14:44 )    Color: Yellow / Appearance: Clear / S.010 / pH: x  Gluc: x / Ketone: Trace  / Bili: Negative / Urobili: Negative   Blood: x / Protein: 30 mg/dL / Nitrite: Negative   Leuk Esterase: Trace / RBC: 2-5 /HPF / WBC 6-10 /HPF   Sq Epi: x / Non Sq Epi: Moderate /HPF / Bacteria: Many /HPF        MEDICATIONS  (STANDING):  ascorbic acid 500 milliGRAM(s) Oral two times a day  chlorhexidine 2% Cloths 1 Application(s) Topical daily  collagenase Ointment 1 Application(s) Topical daily  enoxaparin Injectable 60 milliGRAM(s) SubCutaneous two times a day  fluconAZOLE   Tablet 200 milliGRAM(s) Oral daily  furosemide    Tablet 20 milliGRAM(s) Oral daily  lactobacillus acidophilus 1 Tablet(s) Oral daily  linezolid    Tablet 600 milliGRAM(s) Oral every 12 hours  multivitamin/minerals 1 Tablet(s) Oral daily  nystatin Powder 1 Application(s) Topical two times a day  pantoprazole    Tablet 40 milliGRAM(s) Oral before breakfast  zinc sulfate 220 milliGRAM(s) Oral daily    MEDICATIONS  (PRN):  acetaminophen  Suppository .. 650 milliGRAM(s) Rectal every 6 hours PRN Temp greater or equal to 38C (100.4F)  ALBUTerol    90 MICROgram(s) HFA Inhaler 2 Puff(s) Inhalation every 6 hours PRN Shortness of Breath and/or Wheezing  sodium chloride 0.9% lock flush 10 milliLiter(s) IV Push every 1 hour PRN Pre/post blood products, medications, blood draw, and to maintain line patency      RADIOLOGY & ADDITIONAL TESTS:    Imaging Personally Reviewed:  [ ] YES  [ ] NO    REVIEW OF SYSTEMS:  CONSTITUTIONAL: No fever, weight loss, or fatigue  EYES: No eye pain, visual disturbances, or discharge  ENMT:  No difficulty hearing, tinnitus, vertigo; No sinus or throat pain  NECK: No pain or stiffness  BREASTS: No pain, masses, or nipple discharge  RESPIRATORY: No cough, wheezing, chills or hemoptysis; No shortness of breath  CARDIOVASCULAR: No chest pain, palpitations, dizziness, or leg swelling  GASTROINTESTINAL: No abdominal or epigastric pain. No nausea, vomiting, or hematemesis; No diarrhea or constipation. No melena or hematochezia.  GENITOURINARY: No dysuria, frequency, hematuria, or incontinence  NEUROLOGICAL: No headaches, memory loss, loss of strength, numbness, or tremors  SKIN: No itching, burning, rashes, or lesions   LYMPH NODES: No enlarged glands  ENDOCRINE: No heat or cold intolerance; No hair loss  MUSCULOSKELETAL: No joint pain or swelling; No muscle, back, or extremity pain  PSYCHIATRIC: No depression, anxiety, mood swings, or difficulty sleeping  HEME/LYMPH: No easy bruising, or bleeding gums  ALLERY AND IMMUNOLOGIC: No hives or eczema      Consultant(s) Notes Reviewed:  [ x ] YES  [ ] NO    PHYSICAL EXAM:  GENERAL: NAD, well-groomed, well-developed  HEAD:  Atraumatic, Normocephalic  EYES: EOMI, PERRLA, conjunctiva and sclera clear  ENMT: No tonsillar erythema, exudates, or enlargement; Moist mucous membranes, Good dentition, No lesions  NECK: Supple, No JVD, Normal thyroid  NERVOUS SYSTEM:  Alert & Oriented X3, Good concentration; Motor Strength 5/5 B/L upper and lower extremities; DTRs 2+ intact and symmetric  CHEST/LUNG: Clear to percussion bilaterally; No rales, rhonchi, wheezing, or rubs  HEART: Regular rate and rhythm; No murmurs, rubs, or gallops  ABDOMEN: Soft, Nontender, Nondistended; Bowel sounds present  EXTREMITIES:  2+ Peripheral Pulses, No clubbing, cyanosis, or edema  LYMPH: No lymphadenopathy noted  SKIN: No rashes or lesions    Care Discussed with Consultants/Other Providers [ x] YES  [ ] NO

## 2020-12-22 NOTE — PROGRESS NOTE ADULT - SUBJECTIVE AND OBJECTIVE BOX
Patient was seen and examined  Patient is a 65y old  Female who presents with a chief complaint of Hypotension (22 Dec 2020 08:25)      INTERVAL HPI/OVERNIGHT EVENTS:  T(C): 37.1 (20 @ 05:05), Max: 37.1 (20 @ 05:05)  HR: 89 (20 @ 10:01) (89 - 102)  BP: 94/62 (20 @ 10:01) (92/56 - 98/50)  RR: 19 (20 @ 05:05) (17 - 19)  SpO2: 100% (20 @ 10:01) (99% - 100%)  Wt(kg): --  I&O's Summary      LABS:                        7.3    10.41 )-----------( 222      ( 22 Dec 2020 08:39 )             24.3         144  |  106  |  15  ----------------------------<  81  3.5   |  32<H>  |  0.64    Ca    8.2<L>      22 Dec 2020 08:39  Phos  3.1       Mg     2.1         TPro  6.5  /  Alb  1.8<L>  /  TBili  1.3<H>  /  DBili  x   /  AST  30  /  ALT  24  /  AlkPhos  244<H>        Urinalysis Basic - ( 20 Dec 2020 14:44 )    Color: Yellow / Appearance: Clear / S.010 / pH: x  Gluc: x / Ketone: Trace  / Bili: Negative / Urobili: Negative   Blood: x / Protein: 30 mg/dL / Nitrite: Negative   Leuk Esterase: Trace / RBC: 2-5 /HPF / WBC 6-10 /HPF   Sq Epi: x / Non Sq Epi: Moderate /HPF / Bacteria: Many /HPF      CAPILLARY BLOOD GLUCOSE              Urinalysis Basic - ( 20 Dec 2020 14:44 )    Color: Yellow / Appearance: Clear / S.010 / pH: x  Gluc: x / Ketone: Trace  / Bili: Negative / Urobili: Negative   Blood: x / Protein: 30 mg/dL / Nitrite: Negative   Leuk Esterase: Trace / RBC: 2-5 /HPF / WBC 6-10 /HPF   Sq Epi: x / Non Sq Epi: Moderate /HPF / Bacteria: Many /HPF        MEDICATIONS  (STANDING):  ascorbic acid 500 milliGRAM(s) Oral two times a day  chlorhexidine 2% Cloths 1 Application(s) Topical daily  collagenase Ointment 1 Application(s) Topical daily  enoxaparin Injectable 60 milliGRAM(s) SubCutaneous two times a day  fluconAZOLE   Tablet 200 milliGRAM(s) Oral daily  furosemide    Tablet 20 milliGRAM(s) Oral daily  lactobacillus acidophilus 1 Tablet(s) Oral daily  linezolid    Tablet 600 milliGRAM(s) Oral every 12 hours  multivitamin/minerals 1 Tablet(s) Oral daily  nystatin Powder 1 Application(s) Topical two times a day  pantoprazole    Tablet 40 milliGRAM(s) Oral before breakfast  zinc sulfate 220 milliGRAM(s) Oral daily    MEDICATIONS  (PRN):  acetaminophen  Suppository .. 650 milliGRAM(s) Rectal every 6 hours PRN Temp greater or equal to 38C (100.4F)  ALBUTerol    90 MICROgram(s) HFA Inhaler 2 Puff(s) Inhalation every 6 hours PRN Shortness of Breath and/or Wheezing  sodium chloride 0.9% lock flush 10 milliLiter(s) IV Push every 1 hour PRN Pre/post blood products, medications, blood draw, and to maintain line patency      RADIOLOGY & ADDITIONAL TESTS:    Imaging Personally Reviewed:  [ ] YES  [ ] NO    REVIEW OF SYSTEMS:  CONSTITUTIONAL: No fever, weight loss, or fatigue  EYES: No eye pain, visual disturbances, or discharge  ENMT:  No difficulty hearing, tinnitus, vertigo; No sinus or throat pain  NECK: No pain or stiffness  BREASTS: No pain, masses, or nipple discharge  RESPIRATORY: No cough, wheezing, chills or hemoptysis; No shortness of breath  CARDIOVASCULAR: No chest pain, palpitations, dizziness, or leg swelling  GASTROINTESTINAL: No abdominal or epigastric pain. No nausea, vomiting, or hematemesis; No diarrhea or constipation. No melena or hematochezia.  GENITOURINARY: No dysuria, frequency, hematuria, or incontinence  NEUROLOGICAL: No headaches, memory loss, loss of strength, numbness, or tremors  SKIN: No itching, burning, rashes, or lesions   LYMPH NODES: No enlarged glands  ENDOCRINE: No heat or cold intolerance; No hair loss  MUSCULOSKELETAL: No joint pain or swelling; No muscle, back, or extremity pain  PSYCHIATRIC: No depression, anxiety, mood swings, or difficulty sleeping  HEME/LYMPH: No easy bruising, or bleeding gums  ALLERY AND IMMUNOLOGIC: No hives or eczema      Consultant(s) Notes Reviewed:  [ x ] YES  [ ] NO    PHYSICAL EXAM:  GENERAL: NAD, well-groomed, well-developed  HEAD:  Atraumatic, Normocephalic  EYES: EOMI, PERRLA, conjunctiva and sclera clear  ENMT: No tonsillar erythema, exudates, or enlargement; Moist mucous membranes, Good dentition, No lesions  NECK: Supple, No JVD, Normal thyroid  NERVOUS SYSTEM:  Alert & Oriented X3, Good concentration; Motor Strength 5/5 B/L upper and lower extremities; DTRs 2+ intact and symmetric  CHEST/LUNG: Clear to percussion bilaterally; No rales, rhonchi, wheezing, or rubs  HEART: Regular rate and rhythm; No murmurs, rubs, or gallops  ABDOMEN: Soft, Nontender, Nondistended; Bowel sounds present  EXTREMITIES:  2+ Peripheral Pulses, No clubbing, cyanosis, or edema  LYMPH: No lymphadenopathy noted  SKIN: No rashes or lesions    Care Discussed with Consultants/Other Providers [ x] YES  [ ] NO

## 2020-12-22 NOTE — PROGRESS NOTE ADULT - ASSESSMENT
Patient is a 65y old  Female from home, lives with daughter, ambulates with walker with PMH of recurrent SBO's, s/p exp lap, SB resection in 2015, ex lap, ALBINA in 2018, DVT, PE, on Xarelto, IVC filter, chronic leg swelling, and anasarca, Now send in to the ER by her PCP, Dr. Ross, for evaluation of  generalized weakness and hypotension BP of 80/40 during office visit. On admission, she found to have no fever and BP was in lower normal range and no Leukocytosis. The CXR is clear and CT abd/pelvis consistent with Ileus. The ID consult requested to assist with evaluation of infectious etiology of episode of hypotension. Found to have Bacteroides bacteremia and now developed septic shock on Cefepime and Flagyl. Hence transferred to ICU. 10/20/20.    # Hypotension  at office- BP of 80/40 - resolved   #  Bacteroides fragilis Bacteremia - 10/13/20 - ? source most likely GI- Repeat Blood Cxs have NGTD 10/16/20  # Ileus  - h/o recurrent SBO and s/p EXp. LAp  # COVID 19 negative   # Septic shock ( Hypothermia + hypotensive)- transferred to ICU since requiring pressor- source GI, The CT abd/pelvis shows nonspecific enterocolitis, noninfectious inflammatory bowel disease, or ischemic bowel, along with ileus.   # Pneumonia- HCAP vs Aspiration - on CXR 10/24 and CT chest 10/21- sputum Cx grew Methicillin resistant Staphylococcus aureus  and Stenotrophomonas maltophilia.  # S/p extubated 11/9/20  # Infected sacral ulcer- s/p debridement and culture grew Enterococcus, VRE and Candida, sensitivity is pending- The MRI of pelvis shows  Osteomyelitis of the lower sacrum and coccyx with overlying cutaneous ulceration.   # Fever- 11/8 and 11/19- BCX NGTD 11/19 - The CXR shows Improvement in bilateral airspace disease with persistent bibasilar consolidation. She is s/p removal of velazquez catheter and passed TOV.  # HCAP- 11/27/20,  Intermittent fever and CXR shows  bilateral consolidations right greater than left. - The procalcitonin level is 1.55  # s/p PICC line placement 11/30  # Persistent effusions left greater than right      would recommend:    1. Monitor Temp. and c/w supportive care   2. Continue Sacral wound care as per Surgery   3. Continue oral Linezolid  and oral fluconazole until  12/29/20  4. Aspiration precaution  5. Frequent repositioning   6. OOB to chair        Attending Attestation:    Spent more than 35 minutes on total encounter, more than 50 % of the visit was spent counseling and/or coordinating care by the Attending physician.

## 2020-12-22 NOTE — PROGRESS NOTE ADULT - SUBJECTIVE AND OBJECTIVE BOX
Patient is seen and examined at the bed side,  is afebrile today. The repeat Blood cultures have no growth to date.      REVIEW OF SYSTEMS: All other review systems are negative      ALLERGIES: No Known Allergies      Vital Signs Last 24 Hrs  T(C): 36.6 (22 Dec 2020 14:32), Max: 37.1 (22 Dec 2020 05:05)  T(F): 97.8 (22 Dec 2020 14:32), Max: 98.7 (22 Dec 2020 05:05)  HR: 90 (22 Dec 2020 14:32) (89 - 102)  BP: 105/72 (22 Dec 2020 14:32) (92/56 - 105/72)  BP(mean): --  RR: 18 (22 Dec 2020 14:32) (17 - 19)  SpO2: 100% (22 Dec 2020 14:32) (99% - 100%)      PHYSICAL EXAM:  GENERAL: Not in distress, off oxygen   CHEST/LUNG: Not using accessory muscles   HEART: s1 and s2 present  ABDOMEN:  Nontender and  Nondistended  EXTREMITIES: B/L UE edematous improved  CNS: Awake and Alert         LABS:                               7.3    10.41 )-----------( 222      ( 22 Dec 2020 08:39 )             24.3                9.4    13.14 )-----------( 149      ( 14 Nov 2020 07:58 )             28.7       12-22    144  |  106  |  15  ----------------------------<  81  3.5   |  32<H>  |  0.64    Ca    8.2<L>      22 Dec 2020 08:39  Phos  3.1     12-22  Mg     2.1     12-22    TPro  6.5  /  Alb  1.8<L>  /  TBili  1.3<H>  /  DBili  x   /  AST  30  /  ALT  24  /  AlkPhos  244<H>  12-22      11-06    138  |  104  |  37<H>  ----------------------------<  81  3.9   |  25  |  2.37<H>    Ca    8.1<L>      06 Nov 2020 06:20  Phos  3.4     11-06  Mg     2.0     11-06    TPro  5.1<L>  /  Alb  2.8<L>  /  TBili  9.2<H>  /  DBili  x   /  AST  233<H>  /  ALT  99<H>  /  AlkPhos  96  11-06      Procalcitonin, Serum (12.16.20 @ 19:11)   Procalcitonin, Serum: 0.98:     Vancomycin Level, Trough (11.07.20 @ 21:49)   Vancomycin Level, Trough: 16.1:     Vancomycin Level, Trough (11.07.20 @ 07:08) : 19.5  Vancomycin Level, Trough (11.06.20 @ 06:20) : 19.9:   Procalcitonin, Serum (11.28.20 @ 15:38) : 1.55:       MEDICATIONS  (STANDING):    ascorbic acid 500 milliGRAM(s) Oral two times a day  chlorhexidine 2% Cloths 1 Application(s) Topical daily  collagenase Ointment 1 Application(s) Topical daily  enoxaparin Injectable 60 milliGRAM(s) SubCutaneous two times a day  fluconAZOLE   Tablet 200 milliGRAM(s) Oral daily  furosemide    Tablet 20 milliGRAM(s) Oral daily  lactobacillus acidophilus 1 Tablet(s) Oral daily  linezolid    Tablet 600 milliGRAM(s) Oral every 12 hours  multivitamin/minerals 1 Tablet(s) Oral daily  nystatin Powder 1 Application(s) Topical two times a day  pantoprazole    Tablet 40 milliGRAM(s) Oral before breakfast  zinc sulfate 220 milliGRAM(s) Oral daily        RADIOLOGY & ADDITIONAL TESTS:    12/9/20 : Xray Chest 1 View- PORTABLE-Routine (Xray Chest 1 View- PORTABLE-Routine in AM.) (12.09.20 @ 11:54) >  IMPRESSION: Persistent effusions left greater than right.      11/27/20 : Xray Chest 1 View- PORTABLE-Urgent (Xray Chest 1 View- PORTABLE-Urgent .) (11.27.20 @ 06:53) Increased interstitial lung markings with bilateral consolidations right greater than left. Small right pleural effusion. Overall worsening compared to prior exam.      11/24/20 : MR Pelvis Bony Only No Cont (11.24.20 @ 18:02) : Osteomyelitis of the lower sacrum and coccyx with overlying cutaneous ulceration,      11/19/20 : Xray Chest 1 View- PORTABLE-Urgent (Xray Chest 1 View- PORTABLE-Urgent .) (11.19.20 @ 23:53): Improvement in bilateral airspace disease with persistent bibasilar consolidation and small effusions.    11/4/20 : Xray Chest 1 View- PORTABLE-Routine (Xray Chest 1 View- PORTABLE-Routine in AM.) (11.04.20 @ 10:59) Reexpansion of the left lung with residual left pleural effusion and/or infiltrate.  Right pulmonary edema unchanged.  Tubes and catheters in satisfactory position.      10/25/20: Xray Chest 1 View- PORTABLE-Routine (Xray Chest 1 View- PORTABLE-Routine in AM.) (10.25.20 @ 09:21) Frontal expiratory view of the chest shows the heart to be similar in size. Endotracheal tube, right jugular line and feeding tube remain present. The lungs show similar left upper lobe infiltrate with progression of right perihilar infiltrate. Pleural effusions are similar. There is no evidence of pneumothorax.    10/21/20 : CT Abdomen and Pelvis w/ Oral Cont and w/ IV Cont (10.21.20 @ 15:59) Mural thickening of the left colon and rectum. Liquid stool in the colon. Apparent segmental mural thickening of the mid to distal small bowel and the proximal duodenum. Findings may represent nonspecific enterocolitis, noninfectious inflammatory bowel disease, or ischemic bowel. Clinical correlation is recommended. The celiac axis artery, SMA, and KINA are patent without stenosis.        MICROBIOLOGY DATA:    Culture - Blood in AM (12.21.20 @ 11:19)   Specimen Source: .Blood Blood   Culture Results: No growth to date.     Culture - Blood in AM (12.21.20 @ 11:19)   Specimen Source: .Blood Blood   Culture Results: No growth to date.     Culture - Urine (12.20.20 @ 18:56)   Specimen Source: .Urine Clean Catch (Midstream)   Culture Results: No growth     MRSA/MSSA PCR (12.01.20 @ 01:16)   MRSA PCR Result.: Detected:    MRSA/MSSA PCR (11.28.20 @ 11:34)   MRSA PCR Result.: Detected:     Urine Microscopic-Add On (NC) (11.20.20 @ 04:12)   Red Blood Cell - Urine: 5-10 /HPF   White Blood Cell - Urine: 11-25 /HPF   Calcium Oxalate Crystals: Few /HPF   Bacteria: Moderate /HPF   Comment - Urine: few amorphous urates   Epithelial Cells: Few /HPF     Culture - Blood (11.19.20 @ 10:19)   Specimen Source: .Blood Blood-Peripheral   Culture Results: No growth to date.     Culture - Surgical Swab (11.12.20 @ 05:01)   - Ampicillin: R >8 Predicts results to ampicillin/sulbactam, amoxacillin-clavulanate and piperacillin-tazobactam.   - Levofloxacin: R >4   - Linezolid: S 1   - Tetra/Doxy: R >8   - Vancomycin: R >16   Specimen Source: .Surgical Swab Sacral decub   Culture Results:   Few Enterococcus faecium (vancomycin resistant)   Rare Candida albicans "Susceptibilities not performed"   Organism Identification: Enterococcus faecium (vancomycin resistant)   Organism: Enterococcus faecium (vancomycin resistant)   Method Type: STEWART     Culture - Surgical Swab (11.12.20 @ 05:01)   Specimen Source: .Surgical Swab Sacral decub   Culture Results:  Few Enterococcus faecium Susceptibility to follow.   Rare Candida albicans "Susceptibilities not performed"     Culture - Sputum . (10.26.20 @ 00:26)   - Ampicillin/Sulbactam: R <=8/4   - Cefazolin: R >16   - Ceftazidime: I 16   - Clindamycin: R >4   - Erythromycin: R >4   - Gentamicin: S <=1 Should not be used as monotherapy   - Levofloxacin: S <=0.5   - Linezolid: S 2   - Oxacillin: R >2   - Penicillin: R >8   - RIF- Rifampin: S <=1 Should not be used as monotherapy   - Tetra/Doxy: S <=1   - Trimethoprim/Sulfamethoxazole: S <=0.5/9.5   - Trimethoprim/Sulfamethoxazole: S <=0.5/9.5   - Vancomycin: S 1     MRSA/MSSA PCR (10.21.20 @ 09:41)   MRSA PCR Result.: Detected:       Culture - Blood (10.20.20 @ 22:09)   Specimen Source: .Blood Blood   Culture Results:  No growth to date.     Culture - Blood (10.20.20 @ 22:09)   Specimen Source: .Blood Blood   Culture Results:   No growth to date.     Culture - Blood in AM (10.16.20 @ 10:13)   Specimen Source: .Blood Blood-Peripheral   Culture Results: No growth to date.     Culture - Blood in AM (10.16.20 @ 10:13)   Specimen Source: .Blood Blood-Peripheral   Culture Results: No growth to date.     Culture - Blood (10.13.20 @ 22:23)   Growth in anaerobic bottle: Gram Negative Rods   Specimen Source: .Blood Blood-Peripheral   Organism: Blood Culture PCR   Culture Results: Growth in anaerobic bottle: Bacteroides fragilis   "Susceptibilities not performed"     Culture - Blood (10.13.20 @ 22:23)   Specimen Source: .Blood Blood-Peripheral   Culture Results:   No growth to date.        Patient is seen and examined at the bed side,  is afebrile for last 24 hours.. The repeat Blood cultures have no growth to date.      REVIEW OF SYSTEMS: All other review systems are negative      ALLERGIES: No Known Allergies      Vital Signs Last 24 Hrs  T(C): 36.6 (22 Dec 2020 14:32), Max: 37.1 (22 Dec 2020 05:05)  T(F): 97.8 (22 Dec 2020 14:32), Max: 98.7 (22 Dec 2020 05:05)  HR: 90 (22 Dec 2020 14:32) (89 - 102)  BP: 105/72 (22 Dec 2020 14:32) (92/56 - 105/72)  BP(mean): --  RR: 18 (22 Dec 2020 14:32) (17 - 19)  SpO2: 100% (22 Dec 2020 14:32) (99% - 100%)      PHYSICAL EXAM:  GENERAL: Not in distress, off oxygen   CHEST/LUNG: Not using accessory muscles   HEART: s1 and s2 present  ABDOMEN:  Nontender and  Nondistended  EXTREMITIES: B/L UE edematous improved  CNS: Awake and Alert         LABS:                               7.3    10.41 )-----------( 222      ( 22 Dec 2020 08:39 )             24.3                9.4    13.14 )-----------( 149      ( 14 Nov 2020 07:58 )             28.7       12-22    144  |  106  |  15  ----------------------------<  81  3.5   |  32<H>  |  0.64    Ca    8.2<L>      22 Dec 2020 08:39  Phos  3.1     12-22  Mg     2.1     12-22    TPro  6.5  /  Alb  1.8<L>  /  TBili  1.3<H>  /  DBili  x   /  AST  30  /  ALT  24  /  AlkPhos  244<H>  12-22      11-06    138  |  104  |  37<H>  ----------------------------<  81  3.9   |  25  |  2.37<H>    Ca    8.1<L>      06 Nov 2020 06:20  Phos  3.4     11-06  Mg     2.0     11-06    TPro  5.1<L>  /  Alb  2.8<L>  /  TBili  9.2<H>  /  DBili  x   /  AST  233<H>  /  ALT  99<H>  /  AlkPhos  96  11-06      Procalcitonin, Serum (12.16.20 @ 19:11)   Procalcitonin, Serum: 0.98:     Vancomycin Level, Trough (11.07.20 @ 21:49)   Vancomycin Level, Trough: 16.1:     Vancomycin Level, Trough (11.07.20 @ 07:08) : 19.5  Vancomycin Level, Trough (11.06.20 @ 06:20) : 19.9:   Procalcitonin, Serum (11.28.20 @ 15:38) : 1.55:       MEDICATIONS  (STANDING):    ascorbic acid 500 milliGRAM(s) Oral two times a day  chlorhexidine 2% Cloths 1 Application(s) Topical daily  collagenase Ointment 1 Application(s) Topical daily  enoxaparin Injectable 60 milliGRAM(s) SubCutaneous two times a day  fluconAZOLE   Tablet 200 milliGRAM(s) Oral daily  furosemide    Tablet 20 milliGRAM(s) Oral daily  lactobacillus acidophilus 1 Tablet(s) Oral daily  linezolid    Tablet 600 milliGRAM(s) Oral every 12 hours  multivitamin/minerals 1 Tablet(s) Oral daily  nystatin Powder 1 Application(s) Topical two times a day  pantoprazole    Tablet 40 milliGRAM(s) Oral before breakfast  zinc sulfate 220 milliGRAM(s) Oral daily        RADIOLOGY & ADDITIONAL TESTS:    12/9/20 : Xray Chest 1 View- PORTABLE-Routine (Xray Chest 1 View- PORTABLE-Routine in AM.) (12.09.20 @ 11:54) >  IMPRESSION: Persistent effusions left greater than right.      11/27/20 : Xray Chest 1 View- PORTABLE-Urgent (Xray Chest 1 View- PORTABLE-Urgent .) (11.27.20 @ 06:53) Increased interstitial lung markings with bilateral consolidations right greater than left. Small right pleural effusion. Overall worsening compared to prior exam.      11/24/20 : MR Pelvis Bony Only No Cont (11.24.20 @ 18:02) : Osteomyelitis of the lower sacrum and coccyx with overlying cutaneous ulceration,      11/19/20 : Xray Chest 1 View- PORTABLE-Urgent (Xray Chest 1 View- PORTABLE-Urgent .) (11.19.20 @ 23:53): Improvement in bilateral airspace disease with persistent bibasilar consolidation and small effusions.    11/4/20 : Xray Chest 1 View- PORTABLE-Routine (Xray Chest 1 View- PORTABLE-Routine in AM.) (11.04.20 @ 10:59) Reexpansion of the left lung with residual left pleural effusion and/or infiltrate.  Right pulmonary edema unchanged.  Tubes and catheters in satisfactory position.      10/25/20: Xray Chest 1 View- PORTABLE-Routine (Xray Chest 1 View- PORTABLE-Routine in AM.) (10.25.20 @ 09:21) Frontal expiratory view of the chest shows the heart to be similar in size. Endotracheal tube, right jugular line and feeding tube remain present. The lungs show similar left upper lobe infiltrate with progression of right perihilar infiltrate. Pleural effusions are similar. There is no evidence of pneumothorax.    10/21/20 : CT Abdomen and Pelvis w/ Oral Cont and w/ IV Cont (10.21.20 @ 15:59) Mural thickening of the left colon and rectum. Liquid stool in the colon. Apparent segmental mural thickening of the mid to distal small bowel and the proximal duodenum. Findings may represent nonspecific enterocolitis, noninfectious inflammatory bowel disease, or ischemic bowel. Clinical correlation is recommended. The celiac axis artery, SMA, and KINA are patent without stenosis.        MICROBIOLOGY DATA:    Culture - Blood in AM (12.21.20 @ 11:19)   Specimen Source: .Blood Blood   Culture Results: No growth to date.     Culture - Blood in AM (12.21.20 @ 11:19)   Specimen Source: .Blood Blood   Culture Results: No growth to date.     Culture - Urine (12.20.20 @ 18:56)   Specimen Source: .Urine Clean Catch (Midstream)   Culture Results: No growth     MRSA/MSSA PCR (12.01.20 @ 01:16)   MRSA PCR Result.: Detected:    MRSA/MSSA PCR (11.28.20 @ 11:34)   MRSA PCR Result.: Detected:     Urine Microscopic-Add On (NC) (11.20.20 @ 04:12)   Red Blood Cell - Urine: 5-10 /HPF   White Blood Cell - Urine: 11-25 /HPF   Calcium Oxalate Crystals: Few /HPF   Bacteria: Moderate /HPF   Comment - Urine: few amorphous urates   Epithelial Cells: Few /HPF     Culture - Blood (11.19.20 @ 10:19)   Specimen Source: .Blood Blood-Peripheral   Culture Results: No growth to date.     Culture - Surgical Swab (11.12.20 @ 05:01)   - Ampicillin: R >8 Predicts results to ampicillin/sulbactam, amoxacillin-clavulanate and piperacillin-tazobactam.   - Levofloxacin: R >4   - Linezolid: S 1   - Tetra/Doxy: R >8   - Vancomycin: R >16   Specimen Source: .Surgical Swab Sacral decub   Culture Results:   Few Enterococcus faecium (vancomycin resistant)   Rare Candida albicans "Susceptibilities not performed"   Organism Identification: Enterococcus faecium (vancomycin resistant)   Organism: Enterococcus faecium (vancomycin resistant)   Method Type: STEWART     Culture - Surgical Swab (11.12.20 @ 05:01)   Specimen Source: .Surgical Swab Sacral decub   Culture Results:  Few Enterococcus faecium Susceptibility to follow.   Rare Candida albicans "Susceptibilities not performed"     Culture - Sputum . (10.26.20 @ 00:26)   - Ampicillin/Sulbactam: R <=8/4   - Cefazolin: R >16   - Ceftazidime: I 16   - Clindamycin: R >4   - Erythromycin: R >4   - Gentamicin: S <=1 Should not be used as monotherapy   - Levofloxacin: S <=0.5   - Linezolid: S 2   - Oxacillin: R >2   - Penicillin: R >8   - RIF- Rifampin: S <=1 Should not be used as monotherapy   - Tetra/Doxy: S <=1   - Trimethoprim/Sulfamethoxazole: S <=0.5/9.5   - Trimethoprim/Sulfamethoxazole: S <=0.5/9.5   - Vancomycin: S 1     MRSA/MSSA PCR (10.21.20 @ 09:41)   MRSA PCR Result.: Detected:       Culture - Blood (10.20.20 @ 22:09)   Specimen Source: .Blood Blood   Culture Results:  No growth to date.     Culture - Blood (10.20.20 @ 22:09)   Specimen Source: .Blood Blood   Culture Results:   No growth to date.     Culture - Blood in AM (10.16.20 @ 10:13)   Specimen Source: .Blood Blood-Peripheral   Culture Results: No growth to date.     Culture - Blood in AM (10.16.20 @ 10:13)   Specimen Source: .Blood Blood-Peripheral   Culture Results: No growth to date.     Culture - Blood (10.13.20 @ 22:23)   Growth in anaerobic bottle: Gram Negative Rods   Specimen Source: .Blood Blood-Peripheral   Organism: Blood Culture PCR   Culture Results: Growth in anaerobic bottle: Bacteroides fragilis   "Susceptibilities not performed"     Culture - Blood (10.13.20 @ 22:23)   Specimen Source: .Blood Blood-Peripheral   Culture Results:   No growth to date.

## 2020-12-22 NOTE — PROGRESS NOTE ADULT - SUBJECTIVE AND OBJECTIVE BOX
MARIBETH PADILLA    SCU progress note    INTERVAL HPI/OVERNIGHT EVENTS: ***No overnight events. Afebrile    DNR [x ]   DNI  [  ]   TRIAL OF INTUBATION    Covid - 19 PCR:   Negative    The 4Ms    What Matters Most: see [facility]  Age appropriate Medications/Screen for High Risk Medication: Yes  Mentation: see CAM below  Mobility: defer to physical exam    The Confusion Assessment Method (CAM) Diagnostic Algorithm     1: Acute Onset or Fluctuating Course  - Is there evidence of an acute change in mental status from the patient’s baseline? Did the (abnormal) behavior  fluctuate during the day, that is, tend to come and go, or increase and decrease in severity?  [ ] YES [x ] NO     2: Inattention  - Did the patient have difficulty focusing attention, being easily distractible, or having difficulty keeping track of what was being said?  [ ] YES [x ] NO     3: Disorganized thinking  -Was the patient’s thinking disorganized or incoherent, such as rambling or irrelevant conversation, unclear or illogical flow of ideas, or unpredictable switching from subject to subject?  [ ] YES [x ] NO    4: Altered Level of consciousness?  [ ] YES [x ] NO    The diagnosis of delirium by CAM requires the presence of features 1 and 2 and either 3 or 4.    PRESSORS: [ ] YES [x ] NO  fluconAZOLE   Tablet 200 milliGRAM(s) Oral daily  linezolid    Tablet 600 milliGRAM(s) Oral every 12 hours    Cardiovascular:  Heart Failure  Acute   Acute on Chronic  Chronic       furosemide    Tablet 20 milliGRAM(s) Oral daily    Pulmonary:  ALBUTerol    90 MICROgram(s) HFA Inhaler 2 Puff(s) Inhalation every 6 hours PRN    Hematalogic:  enoxaparin Injectable 60 milliGRAM(s) SubCutaneous two times a day    Other:  acetaminophen  Suppository .. 650 milliGRAM(s) Rectal every 6 hours PRN  ascorbic acid 500 milliGRAM(s) Oral two times a day  chlorhexidine 2% Cloths 1 Application(s) Topical daily  collagenase Ointment 1 Application(s) Topical daily  lactobacillus acidophilus 1 Tablet(s) Oral daily  multivitamin/minerals 1 Tablet(s) Oral daily  nystatin Powder 1 Application(s) Topical two times a day  pantoprazole    Tablet 40 milliGRAM(s) Oral before breakfast  sodium chloride 0.9% lock flush 10 milliLiter(s) IV Push every 1 hour PRN  zinc sulfate 220 milliGRAM(s) Oral daily    acetaminophen  Suppository .. 650 milliGRAM(s) Rectal every 6 hours PRN  ALBUTerol    90 MICROgram(s) HFA Inhaler 2 Puff(s) Inhalation every 6 hours PRN  ascorbic acid 500 milliGRAM(s) Oral two times a day  chlorhexidine 2% Cloths 1 Application(s) Topical daily  collagenase Ointment 1 Application(s) Topical daily  enoxaparin Injectable 60 milliGRAM(s) SubCutaneous two times a day  fluconAZOLE   Tablet 200 milliGRAM(s) Oral daily  furosemide    Tablet 20 milliGRAM(s) Oral daily  lactobacillus acidophilus 1 Tablet(s) Oral daily  linezolid    Tablet 600 milliGRAM(s) Oral every 12 hours  multivitamin/minerals 1 Tablet(s) Oral daily  nystatin Powder 1 Application(s) Topical two times a day  pantoprazole    Tablet 40 milliGRAM(s) Oral before breakfast  sodium chloride 0.9% lock flush 10 milliLiter(s) IV Push every 1 hour PRN  zinc sulfate 220 milliGRAM(s) Oral daily    Drug Dosing Weight  Height (cm): 167.6 (21 Oct 2020 02:26)  Weight (kg): 56.2 (21 Oct 2020 02:26)  BMI (kg/m2): 20 (21 Oct 2020 02:26)  BSA (m2): 1.63 (21 Oct 2020 02:26)    CENTRAL LINE: [ ] YES [x ] NO  LOCATION:   DATE INSERTED:  REMOVE: [ ] YES [ ] NO  EXPLAIN:    TREJO: [ ] YES [x ] NO    DATE INSERTED:  REMOVE:  [ ] YES [ ] NO  EXPLAIN:    PAST MEDICAL & SURGICAL HISTORY:  Anasarca    Pulmonary embolism    DVT (deep venous thrombosis)    SBO (small bowel obstruction)    H/O exploratory laparotomy                      PHYSICAL EXAM:    GENERAL: NAD, cachectic  HEAD:  Atraumatic, Normocephalic  EYES: EOMI, PERRLA, conjunctiva and sclera clear  ENMT: No tonsillar erythema, exudates  NECK: Supple, No JVD  NERVOUS SYSTEM:  Alert & Oriented X3, Follows commands. Moving all extremities  CHEST/LUNG: Diminished breath sounds bilateral bases  HEART: Regular rate and rhythm; No murmurs, rubs, or gallops  ABDOMEN: Soft, Nontender, Nondistended; Bowel sounds present  EXTREMITIES:  2+ Peripheral Pulses, No clubbing, cyanosis, or edema  LYMPH: No lymphadenopathy noted  SKIN: sacral decubitus approximately 3.5" in diameter, parts of outer area w/ slough, center of wound healing w/ pink healthy tissue.          LABS:  CBC Full  -  ( 21 Dec 2020 06:47 )  WBC Count : 10.92 K/uL  RBC Count : 2.59 M/uL  Hemoglobin : 7.3 g/dL  Hematocrit : 24.3 %  Platelet Count - Automated : 239 K/uL  Mean Cell Volume : 93.8 fl  Mean Cell Hemoglobin : 28.2 pg  Mean Cell Hemoglobin Concentration : 30.0 gm/dL  Auto Neutrophil # : x  Auto Lymphocyte # : x  Auto Monocyte # : x  Auto Eosinophil # : x  Auto Basophil # : x  Auto Neutrophil % : x  Auto Lymphocyte % : x  Auto Monocyte % : x  Auto Eosinophil % : x  Auto Basophil % : x    -    143  |  104  |  15  ----------------------------<  88  3.5   |  32<H>  |  0.76    Ca    8.4      21 Dec 2020 06:47    TPro  6.7  /  Alb  1.8<L>  /  TBili  1.3<H>  /  DBili  x   /  AST  30  /  ALT  23  /  AlkPhos  262<H>  12-      Urinalysis Basic - ( 20 Dec 2020 14:44 )    Color: Yellow / Appearance: Clear / S.010 / pH: x  Gluc: x / Ketone: Trace  / Bili: Negative / Urobili: Negative   Blood: x / Protein: 30 mg/dL / Nitrite: Negative   Leuk Esterase: Trace / RBC: 2-5 /HPF / WBC 6-10 /HPF   Sq Epi: x / Non Sq Epi: Moderate /HPF / Bacteria: Many /HPF            [  ]  DVT Prophylaxis  [  ]  Nutrition, Brand, Rate         Goal Rate        Abnormal Nutritional Status -  Malnutrition   Cachexia          RADIOLOGY & ADDITIONAL STUDIES:  ***  < from: Xray Chest 1 View- PORTABLE-Routine (Xray Chest 1 View- PORTABLE-Routine .) (20 @ 10:15) >  Low lung volumes. No change heart mediastinum. Bibasilar atelectasis/airspace disease and small effusions without significant change. No pneumothorax. Distended bowel visualized upper abdomen similar to prior. No new abnormality.    Impression: Essentially stable exam    < end of copied text >    Goals of Care Discussion with Family/Proxy/Other   - see note from      MARIBETH PADILLA    SCU progress note    INTERVAL HPI/OVERNIGHT EVENTS: ***No overnight events. Afebrile    DNR [x ]   DNI  [  ]   TRIAL OF INTUBATION    Covid - 19 PCR:   Negative    The 4Ms    What Matters Most: see [facility]  Age appropriate Medications/Screen for High Risk Medication: Yes  Mentation: see CAM below  Mobility: defer to physical exam    The Confusion Assessment Method (CAM) Diagnostic Algorithm     1: Acute Onset or Fluctuating Course  - Is there evidence of an acute change in mental status from the patient’s baseline? Did the (abnormal) behavior  fluctuate during the day, that is, tend to come and go, or increase and decrease in severity?  [ ] YES [x ] NO     2: Inattention  - Did the patient have difficulty focusing attention, being easily distractible, or having difficulty keeping track of what was being said?  [ ] YES [x ] NO     3: Disorganized thinking  -Was the patient’s thinking disorganized or incoherent, such as rambling or irrelevant conversation, unclear or illogical flow of ideas, or unpredictable switching from subject to subject?  [ ] YES [x ] NO    4: Altered Level of consciousness?  [ ] YES [x ] NO    The diagnosis of delirium by CAM requires the presence of features 1 and 2 and either 3 or 4.    PRESSORS: [ ] YES [x ] NO  fluconAZOLE   Tablet 200 milliGRAM(s) Oral daily  linezolid    Tablet 600 milliGRAM(s) Oral every 12 hours    Cardiovascular:  Heart Failure  Acute   Acute on Chronic  Chronic       furosemide    Tablet 20 milliGRAM(s) Oral daily    Pulmonary:  ALBUTerol    90 MICROgram(s) HFA Inhaler 2 Puff(s) Inhalation every 6 hours PRN    Hematalogic:  enoxaparin Injectable 60 milliGRAM(s) SubCutaneous two times a day    Other:  acetaminophen  Suppository .. 650 milliGRAM(s) Rectal every 6 hours PRN  ascorbic acid 500 milliGRAM(s) Oral two times a day  chlorhexidine 2% Cloths 1 Application(s) Topical daily  collagenase Ointment 1 Application(s) Topical daily  lactobacillus acidophilus 1 Tablet(s) Oral daily  multivitamin/minerals 1 Tablet(s) Oral daily  nystatin Powder 1 Application(s) Topical two times a day  pantoprazole    Tablet 40 milliGRAM(s) Oral before breakfast  sodium chloride 0.9% lock flush 10 milliLiter(s) IV Push every 1 hour PRN  zinc sulfate 220 milliGRAM(s) Oral daily    acetaminophen  Suppository .. 650 milliGRAM(s) Rectal every 6 hours PRN  ALBUTerol    90 MICROgram(s) HFA Inhaler 2 Puff(s) Inhalation every 6 hours PRN  ascorbic acid 500 milliGRAM(s) Oral two times a day  chlorhexidine 2% Cloths 1 Application(s) Topical daily  collagenase Ointment 1 Application(s) Topical daily  enoxaparin Injectable 60 milliGRAM(s) SubCutaneous two times a day  fluconAZOLE   Tablet 200 milliGRAM(s) Oral daily  furosemide    Tablet 20 milliGRAM(s) Oral daily  lactobacillus acidophilus 1 Tablet(s) Oral daily  linezolid    Tablet 600 milliGRAM(s) Oral every 12 hours  multivitamin/minerals 1 Tablet(s) Oral daily  nystatin Powder 1 Application(s) Topical two times a day  pantoprazole    Tablet 40 milliGRAM(s) Oral before breakfast  sodium chloride 0.9% lock flush 10 milliLiter(s) IV Push every 1 hour PRN  zinc sulfate 220 milliGRAM(s) Oral daily    Drug Dosing Weight  Height (cm): 167.6 (21 Oct 2020 02:26)  Weight (kg): 56.2 (21 Oct 2020 02:26)  BMI (kg/m2): 20 (21 Oct 2020 02:26)  BSA (m2): 1.63 (21 Oct 2020 02:26)    CENTRAL LINE: [ ] YES [x ] NO  LOCATION:   DATE INSERTED:  REMOVE: [ ] YES [ ] NO  EXPLAIN:    TREJO: [ ] YES [x ] NO    DATE INSERTED:  REMOVE:  [ ] YES [ ] NO  EXPLAIN:    PAST MEDICAL & SURGICAL HISTORY:  Anasarca    Pulmonary embolism    DVT (deep venous thrombosis)    SBO (small bowel obstruction)    H/O exploratory laparotomy            PHYSICAL EXAM:    GENERAL: NAD, cachectic  HEAD:  Atraumatic, Normocephalic  EYES: EOMI, PERRLA, conjunctiva and sclera clear  ENMT: No tonsillar erythema, exudates  NECK: Supple, No JVD  NERVOUS SYSTEM:  Alert & Oriented X3, Follows commands. Moving all extremities  CHEST/LUNG: Diminished breath sounds bilateral bases  HEART: Regular rate and rhythm; No murmurs, rubs, or gallops  ABDOMEN: Soft, Nontender, Nondistended; Bowel sounds present  EXTREMITIES:  2+ Peripheral Pulses, No clubbing, cyanosis, or edema  LYMPH: No lymphadenopathy noted  SKIN: sacral decubitus approximately 3.5" in diameter, parts of outer area w/ slough, center of wound healing w/ pink healthy tissue.          LABS:  CBC Full  -  ( 21 Dec 2020 06:47 )  WBC Count : 10.92 K/uL  RBC Count : 2.59 M/uL  Hemoglobin : 7.3 g/dL  Hematocrit : 24.3 %  Platelet Count - Automated : 239 K/uL  Mean Cell Volume : 93.8 fl  Mean Cell Hemoglobin : 28.2 pg  Mean Cell Hemoglobin Concentration : 30.0 gm/dL  Auto Neutrophil # : x  Auto Lymphocyte # : x  Auto Monocyte # : x  Auto Eosinophil # : x  Auto Basophil # : x  Auto Neutrophil % : x  Auto Lymphocyte % : x  Auto Monocyte % : x  Auto Eosinophil % : x  Auto Basophil % : x    -    143  |  104  |  15  ----------------------------<  88  3.5   |  32<H>  |  0.76    Ca    8.4      21 Dec 2020 06:47    TPro  6.7  /  Alb  1.8<L>  /  TBili  1.3<H>  /  DBili  x   /  AST  30  /  ALT  23  /  AlkPhos  262<H>  12-      Urinalysis Basic - ( 20 Dec 2020 14:44 )    Color: Yellow / Appearance: Clear / S.010 / pH: x  Gluc: x / Ketone: Trace  / Bili: Negative / Urobili: Negative   Blood: x / Protein: 30 mg/dL / Nitrite: Negative   Leuk Esterase: Trace / RBC: 2-5 /HPF / WBC 6-10 /HPF   Sq Epi: x / Non Sq Epi: Moderate /HPF / Bacteria: Many /HPF            [  ]  DVT Prophylaxis  [  ]  Nutrition, Brand, Rate         Goal Rate        Abnormal Nutritional Status -  Malnutrition   Cachexia          RADIOLOGY & ADDITIONAL STUDIES:  ***  < from: Xray Chest 1 View- PORTABLE-Routine (Xray Chest 1 View- PORTABLE-Routine .) (20 @ 10:15) >  Low lung volumes. No change heart mediastinum. Bibasilar atelectasis/airspace disease and small effusions without significant change. No pneumothorax. Distended bowel visualized upper abdomen similar to prior. No new abnormality.    Impression: Essentially stable exam    < end of copied text >    Goals of Care Discussion with Family/Proxy/Other   - see note from

## 2020-12-22 NOTE — CHART NOTE - NSCHARTNOTESELECT_GEN_ALL_CORE
Nutrition Services
Event Note
Event Note/Palliative Care 
Event Note/Palliative Care 
Event Note/Surgery
Malnutrition Notification
Nutrition Services
Off Service Note
Palliative Care /Event Note
Transfer Note

## 2020-12-22 NOTE — CHART NOTE - NSCHARTNOTEFT_GEN_A_CORE
Assessment:   65yFemalePatient is a 65y old  Female who presents with a chief complaint of Hypotension (22 Dec 2020 14:18). Pt visited. Pt asleep.  D/W RN Pt is Fed by staff. Eating ~50 % of meal. Pt is on Two papi HN BID and Ensure pudding BID. Pt with DTI @ coccyx. S/P Debridement. Pt w Osteomyelitis . Meds Noted On MVIm, Vitc, Zn.       Factors impacting intake: [ ] none [ ] nausea  [ ] vomiting [ ] diarrhea [ ] constipation  [ ]chewing problems [ ] swallowing issues  [x ] other:     Diet Prescription : Diet, Dysphagia 1 Pureed-Thin Liquids:   Low Sodium  Supplement Feeding Modality:  Oral  Two Papi HN Cans or Servings Per Day:  2       Frequency:  Two Times a day  Ensure Pudding Cans or Servings Per Day:  2       Frequency:  Two Times a day (12-02-20 @ 11:57)    Intake: ~50 %    Current Weight:   % Weight Change    Pertinent Medications: MEDICATIONS  (STANDING):  ascorbic acid 500 milliGRAM(s) Oral two times a day  chlorhexidine 2% Cloths 1 Application(s) Topical daily  collagenase Ointment 1 Application(s) Topical daily  enoxaparin Injectable 60 milliGRAM(s) SubCutaneous two times a day  fluconAZOLE   Tablet 200 milliGRAM(s) Oral daily  furosemide    Tablet 20 milliGRAM(s) Oral daily  lactobacillus acidophilus 1 Tablet(s) Oral daily  linezolid    Tablet 600 milliGRAM(s) Oral every 12 hours  multivitamin/minerals 1 Tablet(s) Oral daily  nystatin Powder 1 Application(s) Topical two times a day  pantoprazole    Tablet 40 milliGRAM(s) Oral before breakfast  zinc sulfate 220 milliGRAM(s) Oral daily    MEDICATIONS  (PRN):  acetaminophen  Suppository .. 650 milliGRAM(s) Rectal every 6 hours PRN Temp greater or equal to 38C (100.4F)  ALBUTerol    90 MICROgram(s) HFA Inhaler 2 Puff(s) Inhalation every 6 hours PRN Shortness of Breath and/or Wheezing  sodium chloride 0.9% lock flush 10 milliLiter(s) IV Push every 1 hour PRN Pre/post blood products, medications, blood draw, and to maintain line patency    Pertinent Labs: 12-22 Na144 mmol/L Glu 81 mg/dL K+ 3.5 mmol/L Cr  0.64 mg/dL BUN 15 mg/dL 12-22 Phos 3.1 mg/dL 12-22 Alb 1.8 g/dL<L>     CAPILLARY BLOOD GLUCOSE        Skin:  DTI @ Coccyx.    Estimated Needs:   [ ] no change since previous assessment  [ ] recalculated:     Previous Nutrition Diagnosis:   [ ] Inadequate Energy Intake [ ]Inadequate Oral Intake [ ] Excessive Energy Intake   [ ] Underweight [ ] Increased Nutrient Needs [ ] Overweight/Obesity   [ ] Altered GI Function [ ] Unintended Weight Loss [ ] Food & Nutrition Related Knowledge Deficit [x ] Malnutrition Severe    Nutrition Diagnosis is [x ] ongoing  [ ] resolved [ ] not applicable     New Nutrition Diagnosis: [ ] not applicable       Interventions:   Recommend  [ ] Change Diet To:  [x ] Nutrition Supplement Continue with  TWO PAPI HN BID and Ensure Pudding BID.     [ ] Nutrition Support  [x ] Other:   Continue to feed Pt.     Monitoring and Evaluation:   [ ] PO intake [ x ] Tolerance to diet prescription [ x ] weights [ x ] labs[ x ] follow up per protocol  [ ] other:

## 2020-12-22 NOTE — PROGRESS NOTE ADULT - PROBLEM SELECTOR PLAN 4
S/P surgical debridement. +OM  Continue wound care.  No further surgical intervantion at this time  Turn every 2 hours

## 2020-12-23 NOTE — DISCHARGE NOTE NURSING/CASE MANAGEMENT/SOCIAL WORK - PATIENT PORTAL LINK FT
You can access the FollowMyHealth Patient Portal offered by [facility] by registering at the following website: [email or web address]. By joining [employer]’s FollowMyHealth portal, you will also be able to view your health information using other applications (apps) compatible with our system.

## 2020-12-23 NOTE — PROGRESS NOTE ADULT - NSTELEHEALTH_GEN_ALL_CORE
No

## 2020-12-23 NOTE — PROGRESS NOTE ADULT - SUBJECTIVE AND OBJECTIVE BOX
Patient was seen and examined  Patient is a 65y old  Female who presents with a chief complaint of Hypotension (23 Dec 2020 18:56)      INTERVAL HPI/OVERNIGHT EVENTS:  T(C): 36.2 (12-23-20 @ 14:00), Max: 37.4 (12-22-20 @ 20:15)  HR: 90 (12-23-20 @ 14:00) (89 - 97)  BP: 109/72 (12-23-20 @ 14:00) (103/58 - 109/72)  RR: 18 (12-23-20 @ 14:00) (16 - 18)  SpO2: 99% (12-23-20 @ 14:00) (99% - 100%)  Wt(kg): --  I&O's Summary      LABS:                        7.3    8.42  )-----------( 226      ( 23 Dec 2020 07:04 )             25.0     12-23    143  |  105  |  14  ----------------------------<  76  3.4<L>   |  33<H>  |  0.54    Ca    8.1<L>      23 Dec 2020 07:04  Phos  3.5     12-23  Mg     2.1     12-22    TPro  6.3  /  Alb  1.7<L>  /  TBili  1.1  /  DBili  x   /  AST  24  /  ALT  21  /  AlkPhos  242<H>  12-23        CAPILLARY BLOOD GLUCOSE                  MEDICATIONS  (STANDING):  ascorbic acid 500 milliGRAM(s) Oral two times a day  chlorhexidine 2% Cloths 1 Application(s) Topical daily  collagenase Ointment 1 Application(s) Topical daily  enoxaparin Injectable 60 milliGRAM(s) SubCutaneous two times a day  fluconAZOLE   Tablet 200 milliGRAM(s) Oral daily  furosemide    Tablet 20 milliGRAM(s) Oral daily  lactobacillus acidophilus 1 Tablet(s) Oral daily  linezolid    Tablet 600 milliGRAM(s) Oral every 12 hours  multivitamin/minerals 1 Tablet(s) Oral daily  nystatin Powder 1 Application(s) Topical two times a day  pantoprazole    Tablet 40 milliGRAM(s) Oral before breakfast  zinc sulfate 220 milliGRAM(s) Oral daily    MEDICATIONS  (PRN):  acetaminophen  Suppository .. 650 milliGRAM(s) Rectal every 6 hours PRN Temp greater or equal to 38C (100.4F)  ALBUTerol    90 MICROgram(s) HFA Inhaler 2 Puff(s) Inhalation every 6 hours PRN Shortness of Breath and/or Wheezing  sodium chloride 0.9% lock flush 10 milliLiter(s) IV Push every 1 hour PRN Pre/post blood products, medications, blood draw, and to maintain line patency      RADIOLOGY & ADDITIONAL TESTS:    Imaging Personally Reviewed:  [ ] YES  [ ] NO    REVIEW OF SYSTEMS:  CONSTITUTIONAL: No fever, weight loss, or fatigue  EYES: No eye pain, visual disturbances, or discharge  ENMT:  No difficulty hearing, tinnitus, vertigo; No sinus or throat pain  NECK: No pain or stiffness  BREASTS: No pain, masses, or nipple discharge  RESPIRATORY: No cough, wheezing, chills or hemoptysis; No shortness of breath  CARDIOVASCULAR: No chest pain, palpitations, dizziness, or leg swelling  GASTROINTESTINAL: No abdominal or epigastric pain. No nausea, vomiting, or hematemesis; No diarrhea or constipation. No melena or hematochezia.  GENITOURINARY: No dysuria, frequency, hematuria, or incontinence  NEUROLOGICAL: No headaches, memory loss, loss of strength, numbness, or tremors  SKIN: No itching, burning, rashes, or lesions   LYMPH NODES: No enlarged glands  ENDOCRINE: No heat or cold intolerance; No hair loss  MUSCULOSKELETAL: No joint pain or swelling; No muscle, back, or extremity pain  PSYCHIATRIC: No depression, anxiety, mood swings, or difficulty sleeping  HEME/LYMPH: No easy bruising, or bleeding gums  ALLERY AND IMMUNOLOGIC: No hives or eczema      Consultant(s) Notes Reviewed:  [ x ] YES  [ ] NO    PHYSICAL EXAM:  GENERAL: NAD, well-groomed, well-developed  HEAD:  Atraumatic, Normocephalic  EYES: EOMI, PERRLA, conjunctiva and sclera clear  ENMT: No tonsillar erythema, exudates, or enlargement; Moist mucous membranes, Good dentition, No lesions  NECK: Supple, No JVD, Normal thyroid  NERVOUS SYSTEM:  Alert & Oriented X3, Good concentration; Motor Strength 5/5 B/L upper and lower extremities; DTRs 2+ intact and symmetric  CHEST/LUNG: Clear to percussion bilaterally; No rales, rhonchi, wheezing, or rubs  HEART: Regular rate and rhythm; No murmurs, rubs, or gallops  ABDOMEN: Soft, Nontender, Nondistended; Bowel sounds present  EXTREMITIES:  2+ Peripheral Pulses, No clubbing, cyanosis, or edema  LYMPH: No lymphadenopathy noted  SKIN: No rashes or lesions    Care Discussed with Consultants/Other Providers [ x] YES  [ ] NO

## 2020-12-23 NOTE — PROGRESS NOTE ADULT - PROVIDER SPECIALTY LIST ADULT
Cardiology
Critical Care
Gastroenterology
Gastroenterology
Heme/Onc
Hepatology
Infectious Disease
Internal Medicine
MICU
Palliative Care
Pulmonology
Surgery
Infectious Disease
Surgery
Critical Care
Internal Medicine
Critical Care
Internal Medicine
Critical Care

## 2020-12-23 NOTE — PROGRESS NOTE ADULT - SUBJECTIVE AND OBJECTIVE BOX
MARIBETH PADILLA    SCU progress note    INTERVAL HPI/OVERNIGHT EVENTS: ***No overnight events.    DNR [x ]   DNI  [  ]   TRIAL OF INTUBATION    Covid - 19 PCR: 12/18 Negative    The 4Ms    What Matters Most: see [facility]  Age appropriate Medications/Screen for High Risk Medication: Yes  Mentation: see CAM below  Mobility: defer to physical exam    The Confusion Assessment Method (CAM) Diagnostic Algorithm     1: Acute Onset or Fluctuating Course  - Is there evidence of an acute change in mental status from the patient’s baseline? Did the (abnormal) behavior  fluctuate during the day, that is, tend to come and go, or increase and decrease in severity?  [ ] YES [x ] NO     2: Inattention  - Did the patient have difficulty focusing attention, being easily distractible, or having difficulty keeping track of what was being said?  [ ] YES [x ] NO     3: Disorganized thinking  -Was the patient’s thinking disorganized or incoherent, such as rambling or irrelevant conversation, unclear or illogical flow of ideas, or unpredictable switching from subject to subject?  [ ] YES [ x] NO    4: Altered Level of consciousness?  [ ] YES [x ] NO    The diagnosis of delirium by CAM requires the presence of features 1 and 2 and either 3 or 4.    PRESSORS: [ ] YES [x ] NO  fluconAZOLE   Tablet 200 milliGRAM(s) Oral daily  linezolid    Tablet 600 milliGRAM(s) Oral every 12 hours    Cardiovascular:  Heart Failure  Acute   Acute on Chronic  Chronic       furosemide    Tablet 20 milliGRAM(s) Oral daily    Pulmonary:  ALBUTerol    90 MICROgram(s) HFA Inhaler 2 Puff(s) Inhalation every 6 hours PRN    Hematalogic:  enoxaparin Injectable 60 milliGRAM(s) SubCutaneous two times a day    Other:  acetaminophen  Suppository .. 650 milliGRAM(s) Rectal every 6 hours PRN  ascorbic acid 500 milliGRAM(s) Oral two times a day  chlorhexidine 2% Cloths 1 Application(s) Topical daily  collagenase Ointment 1 Application(s) Topical daily  lactobacillus acidophilus 1 Tablet(s) Oral daily  multivitamin/minerals 1 Tablet(s) Oral daily  nystatin Powder 1 Application(s) Topical two times a day  pantoprazole    Tablet 40 milliGRAM(s) Oral before breakfast  potassium chloride   Powder 40 milliEquivalent(s) Oral once  sodium chloride 0.9% lock flush 10 milliLiter(s) IV Push every 1 hour PRN  zinc sulfate 220 milliGRAM(s) Oral daily    acetaminophen  Suppository .. 650 milliGRAM(s) Rectal every 6 hours PRN  ALBUTerol    90 MICROgram(s) HFA Inhaler 2 Puff(s) Inhalation every 6 hours PRN  ascorbic acid 500 milliGRAM(s) Oral two times a day  chlorhexidine 2% Cloths 1 Application(s) Topical daily  collagenase Ointment 1 Application(s) Topical daily  enoxaparin Injectable 60 milliGRAM(s) SubCutaneous two times a day  fluconAZOLE   Tablet 200 milliGRAM(s) Oral daily  furosemide    Tablet 20 milliGRAM(s) Oral daily  lactobacillus acidophilus 1 Tablet(s) Oral daily  linezolid    Tablet 600 milliGRAM(s) Oral every 12 hours  multivitamin/minerals 1 Tablet(s) Oral daily  nystatin Powder 1 Application(s) Topical two times a day  pantoprazole    Tablet 40 milliGRAM(s) Oral before breakfast  potassium chloride   Powder 40 milliEquivalent(s) Oral once  sodium chloride 0.9% lock flush 10 milliLiter(s) IV Push every 1 hour PRN  zinc sulfate 220 milliGRAM(s) Oral daily    Drug Dosing Weight  Height (cm): 167.6 (21 Oct 2020 02:26)  Weight (kg): 56.2 (21 Oct 2020 02:26)  BMI (kg/m2): 20 (21 Oct 2020 02:26)  BSA (m2): 1.63 (21 Oct 2020 02:26)    CENTRAL LINE: [ ] YES [x ] NO  LOCATION:   DATE INSERTED:  REMOVE: [ ] YES [ ] NO  EXPLAIN:    TREJO: [ ] YES [x ] NO    DATE INSERTED:  REMOVE:  [ ] YES [ ] NO  EXPLAIN:    PAST MEDICAL & SURGICAL HISTORY:  Anasarca    Pulmonary embolism    DVT (deep venous thrombosis)    SBO (small bowel obstruction)    H/O exploratory laparotomy          PHYSICAL EXAM:    GENERAL: NAD, cachectic  HEAD:  Atraumatic, Normocephalic  EYES: EOMI, PERRLA, conjunctiva and sclera clear  ENMT: No tonsillar erythema, exudates  NECK: Supple, No JVD  NERVOUS SYSTEM:  Awake and alert. Follows commands. Moving all extremities  CHEST/LUNG:   Diminished breath sounds bilateral bases  HEART: Regular rate and rhythm; No murmurs, rubs, or gallops  ABDOMEN: Soft, Nontender, Nondistended; Bowel sounds present  EXTREMITIES:  2+ Peripheral Pulses, No clubbing, cyanosis, or edema  LYMPH: No lymphadenopathy noted  SKIN: sacral decubitus approximately 3.5" in diameter, parts of outer area w/ slough, center of wound healing w/ pink healthy tissue      LABS:  CBC Full  -  ( 23 Dec 2020 07:04 )  WBC Count : 8.42 K/uL  RBC Count : 2.62 M/uL  Hemoglobin : 7.3 g/dL  Hematocrit : 25.0 %  Platelet Count - Automated : 226 K/uL  Mean Cell Volume : 95.4 fl  Mean Cell Hemoglobin : 27.9 pg  Mean Cell Hemoglobin Concentration : 29.2 gm/dL  Auto Neutrophil # : 6.02 K/uL  Auto Lymphocyte # : 1.43 K/uL  Auto Monocyte # : 0.61 K/uL  Auto Eosinophil # : 0.28 K/uL  Auto Basophil # : 0.04 K/uL  Auto Neutrophil % : 71.5 %  Auto Lymphocyte % : 17.0 %  Auto Monocyte % : 7.2 %  Auto Eosinophil % : 3.3 %  Auto Basophil % : 0.5 %    12-23    143  |  105  |  14  ----------------------------<  76  3.4<L>   |  33<H>  |  0.54    Ca    8.1<L>      23 Dec 2020 07:04  Phos  3.5     12-23  Mg     2.1     12-22    TPro  6.3  /  Alb  1.7<L>  /  TBili  1.1  /  DBili  x   /  AST  24  /  ALT  21  /  AlkPhos  242<H>  12-23              [  ]  DVT Prophylaxis  [  ]  Nutrition, Brand, Rate         Goal Rate        Abnormal Nutritional Status -  Malnutrition   Cachexia          RADIOLOGY & ADDITIONAL STUDIES:  ***  < from: Xray Chest 1 View- PORTABLE-Routine (Xray Chest 1 View- PORTABLE-Routine .) (12.21.20 @ 10:15) >  Low lung volumes. No change heart mediastinum. Bibasilar atelectasis/airspace disease and small effusions without significant change. No pneumothorax. Distended bowel visualized upper abdomen similar to prior. No new abnormality.    Impression: Essentially stable exam    < end of copied text >    Goals of Care Discussion with Family/Proxy/Other   - see note from 11/18

## 2020-12-23 NOTE — PROGRESS NOTE ADULT - SUBJECTIVE AND OBJECTIVE BOX
Patient is seen and examined at the bed side, remains afebrile. The discharge plan noted.       REVIEW OF SYSTEMS: All other review systems are negative      ALLERGIES: No Known Allergies      Vital Signs Last 24 Hrs  T(C): 36.2 (23 Dec 2020 14:00), Max: 37.4 (22 Dec 2020 20:15)  T(F): 97.2 (23 Dec 2020 14:00), Max: 99.4 (22 Dec 2020 20:15)  HR: 90 (23 Dec 2020 14:00) (89 - 97)  BP: 109/72 (23 Dec 2020 14:00) (103/58 - 109/72)  BP(mean): --  RR: 18 (23 Dec 2020 14:00) (16 - 18)  SpO2: 99% (23 Dec 2020 14:00) (99% - 100%)      PHYSICAL EXAM:  GENERAL: Not in distress, off oxygen   CHEST/LUNG: Not using accessory muscles   HEART: s1 and s2 present  ABDOMEN:  Nontender and  Nondistended  EXTREMITIES: B/L UE edematous improved  CNS: Awake and Alert         LABS:                        7.3    8.42  )-----------( 226      ( 23 Dec 2020 07:04 )             25.0                  9.4    13.14 )-----------( 149      ( 14 Nov 2020 07:58 )             28.7       12-23    143  |  105  |  14  ----------------------------<  76  3.4<L>   |  33<H>  |  0.54    Ca    8.1<L>      23 Dec 2020 07:04  Phos  3.5     12-23  Mg     2.1     12-22    TPro  6.3  /  Alb  1.7<L>  /  TBili  1.1  /  DBili  x   /  AST  24  /  ALT  21  /  AlkPhos  242<H>  12-23      11-06    138  |  104  |  37<H>  ----------------------------<  81  3.9   |  25  |  2.37<H>    Ca    8.1<L>      06 Nov 2020 06:20  Phos  3.4     11-06  Mg     2.0     11-06    TPro  5.1<L>  /  Alb  2.8<L>  /  TBili  9.2<H>  /  DBili  x   /  AST  233<H>  /  ALT  99<H>  /  AlkPhos  96  11-06      Procalcitonin, Serum (12.16.20 @ 19:11)   Procalcitonin, Serum: 0.98:     Vancomycin Level, Trough (11.07.20 @ 21:49)   Vancomycin Level, Trough: 16.1:     Vancomycin Level, Trough (11.07.20 @ 07:08) : 19.5  Vancomycin Level, Trough (11.06.20 @ 06:20) : 19.9:   Procalcitonin, Serum (11.28.20 @ 15:38) : 1.55:       MEDICATIONS  (STANDING):    ascorbic acid 500 milliGRAM(s) Oral two times a day  chlorhexidine 2% Cloths 1 Application(s) Topical daily  collagenase Ointment 1 Application(s) Topical daily  enoxaparin Injectable 60 milliGRAM(s) SubCutaneous two times a day  fluconAZOLE   Tablet 200 milliGRAM(s) Oral daily  furosemide    Tablet 20 milliGRAM(s) Oral daily  lactobacillus acidophilus 1 Tablet(s) Oral daily  linezolid    Tablet 600 milliGRAM(s) Oral every 12 hours  multivitamin/minerals 1 Tablet(s) Oral daily  nystatin Powder 1 Application(s) Topical two times a day  pantoprazole    Tablet 40 milliGRAM(s) Oral before breakfast  zinc sulfate 220 milliGRAM(s) Oral daily        RADIOLOGY & ADDITIONAL TESTS:    12/9/20 : Xray Chest 1 View- PORTABLE-Routine (Xray Chest 1 View- PORTABLE-Routine in AM.) (12.09.20 @ 11:54) >  IMPRESSION: Persistent effusions left greater than right.      11/27/20 : Xray Chest 1 View- PORTABLE-Urgent (Xray Chest 1 View- PORTABLE-Urgent .) (11.27.20 @ 06:53) Increased interstitial lung markings with bilateral consolidations right greater than left. Small right pleural effusion. Overall worsening compared to prior exam.      11/24/20 : MR Pelvis Bony Only No Cont (11.24.20 @ 18:02) : Osteomyelitis of the lower sacrum and coccyx with overlying cutaneous ulceration,      11/19/20 : Xray Chest 1 View- PORTABLE-Urgent (Xray Chest 1 View- PORTABLE-Urgent .) (11.19.20 @ 23:53): Improvement in bilateral airspace disease with persistent bibasilar consolidation and small effusions.    11/4/20 : Xray Chest 1 View- PORTABLE-Routine (Xray Chest 1 View- PORTABLE-Routine in AM.) (11.04.20 @ 10:59) Reexpansion of the left lung with residual left pleural effusion and/or infiltrate.  Right pulmonary edema unchanged.  Tubes and catheters in satisfactory position.      10/25/20: Xray Chest 1 View- PORTABLE-Routine (Xray Chest 1 View- PORTABLE-Routine in AM.) (10.25.20 @ 09:21) Frontal expiratory view of the chest shows the heart to be similar in size. Endotracheal tube, right jugular line and feeding tube remain present. The lungs show similar left upper lobe infiltrate with progression of right perihilar infiltrate. Pleural effusions are similar. There is no evidence of pneumothorax.    10/21/20 : CT Abdomen and Pelvis w/ Oral Cont and w/ IV Cont (10.21.20 @ 15:59) Mural thickening of the left colon and rectum. Liquid stool in the colon. Apparent segmental mural thickening of the mid to distal small bowel and the proximal duodenum. Findings may represent nonspecific enterocolitis, noninfectious inflammatory bowel disease, or ischemic bowel. Clinical correlation is recommended. The celiac axis artery, SMA, and KINA are patent without stenosis.        MICROBIOLOGY DATA:    Culture - Blood in AM (12.21.20 @ 11:19)   Specimen Source: .Blood Blood   Culture Results: No growth to date.     Culture - Blood in AM (12.21.20 @ 11:19)   Specimen Source: .Blood Blood   Culture Results: No growth to date.     Culture - Urine (12.20.20 @ 18:56)   Specimen Source: .Urine Clean Catch (Midstream)   Culture Results: No growth     MRSA/MSSA PCR (12.01.20 @ 01:16)   MRSA PCR Result.: Detected:    MRSA/MSSA PCR (11.28.20 @ 11:34)   MRSA PCR Result.: Detected:     Urine Microscopic-Add On (NC) (11.20.20 @ 04:12)   Red Blood Cell - Urine: 5-10 /HPF   White Blood Cell - Urine: 11-25 /HPF   Calcium Oxalate Crystals: Few /HPF   Bacteria: Moderate /HPF   Comment - Urine: few amorphous urates   Epithelial Cells: Few /HPF     Culture - Blood (11.19.20 @ 10:19)   Specimen Source: .Blood Blood-Peripheral   Culture Results: No growth to date.     Culture - Surgical Swab (11.12.20 @ 05:01)   - Ampicillin: R >8 Predicts results to ampicillin/sulbactam, amoxacillin-clavulanate and piperacillin-tazobactam.   - Levofloxacin: R >4   - Linezolid: S 1   - Tetra/Doxy: R >8   - Vancomycin: R >16   Specimen Source: .Surgical Swab Sacral decub   Culture Results:   Few Enterococcus faecium (vancomycin resistant)   Rare Candida albicans "Susceptibilities not performed"   Organism Identification: Enterococcus faecium (vancomycin resistant)   Organism: Enterococcus faecium (vancomycin resistant)   Method Type: STEWART     Culture - Surgical Swab (11.12.20 @ 05:01)   Specimen Source: .Surgical Swab Sacral decub   Culture Results:  Few Enterococcus faecium Susceptibility to follow.   Rare Candida albicans "Susceptibilities not performed"     Culture - Sputum . (10.26.20 @ 00:26)   - Ampicillin/Sulbactam: R <=8/4   - Cefazolin: R >16   - Ceftazidime: I 16   - Clindamycin: R >4   - Erythromycin: R >4   - Gentamicin: S <=1 Should not be used as monotherapy   - Levofloxacin: S <=0.5   - Linezolid: S 2   - Oxacillin: R >2   - Penicillin: R >8   - RIF- Rifampin: S <=1 Should not be used as monotherapy   - Tetra/Doxy: S <=1   - Trimethoprim/Sulfamethoxazole: S <=0.5/9.5   - Trimethoprim/Sulfamethoxazole: S <=0.5/9.5   - Vancomycin: S 1     MRSA/MSSA PCR (10.21.20 @ 09:41)   MRSA PCR Result.: Detected:       Culture - Blood (10.20.20 @ 22:09)   Specimen Source: .Blood Blood   Culture Results:  No growth to date.     Culture - Blood (10.20.20 @ 22:09)   Specimen Source: .Blood Blood   Culture Results:   No growth to date.     Culture - Blood in AM (10.16.20 @ 10:13)   Specimen Source: .Blood Blood-Peripheral   Culture Results: No growth to date.     Culture - Blood in AM (10.16.20 @ 10:13)   Specimen Source: .Blood Blood-Peripheral   Culture Results: No growth to date.     Culture - Blood (10.13.20 @ 22:23)   Growth in anaerobic bottle: Gram Negative Rods   Specimen Source: .Blood Blood-Peripheral   Organism: Blood Culture PCR   Culture Results: Growth in anaerobic bottle: Bacteroides fragilis   "Susceptibilities not performed"     Culture - Blood (10.13.20 @ 22:23)   Specimen Source: .Blood Blood-Peripheral   Culture Results:   No growth to date.

## 2020-12-23 NOTE — PROGRESS NOTE ADULT - NUTRITIONAL ASSESSMENT
This patient has been assessed with a concern for Malnutrition and has been determined to have a diagnosis/diagnoses of Severe protein-calorie malnutrition.    This patient is being managed with:   Diet Dysphagia 1 Pureed-Thin Liquids-  Low Sodium  Supplement Feeding Modality:  Oral  Two Papi HN Cans or Servings Per Day:  2       Frequency:  Two Times a day  Ensure Pudding Cans or Servings Per Day:  2       Frequency:  Two Times a day  Entered: Dec  2 2020 11:57AM     Less than or equal to 5 Contractions in 30 minutes

## 2020-12-23 NOTE — PROGRESS NOTE ADULT - PROBLEM/PLAN-7
Test(s) Reviewed  I have reviewed the following in detail:  [x] Stress test   [] Angiography   [] Echocardiogram   [] Labs   [] Other:       Call Pt and tell him stress test is abnormal   Recommend angiogram  Can schedule with next available or make appt to discuss  
DISPLAY PLAN FREE TEXT

## 2020-12-23 NOTE — PROGRESS NOTE ADULT - ASSESSMENT
Patient is a 65y old  Female from home, lives with daughter, ambulates with walker with PMH of recurrent SBO's, s/p exp lap, SB resection in 2015, ex lap, ALBINA in 2018, DVT, PE, on Xarelto, IVC filter, chronic leg swelling, and anasarca, Now send in to the ER by her PCP, Dr. Ross, for evaluation of  generalized weakness and hypotension BP of 80/40 during office visit. On admission, she found to have no fever and BP was in lower normal range and no Leukocytosis. The CXR is clear and CT abd/pelvis consistent with Ileus. The ID consult requested to assist with evaluation of infectious etiology of episode of hypotension. Found to have Bacteroides bacteremia and now developed septic shock on Cefepime and Flagyl. Hence transferred to ICU. 10/20/20.    # Hypotension  at office- BP of 80/40 - resolved   #  Bacteroides fragilis Bacteremia - 10/13/20 - ? source most likely GI- Repeat Blood Cxs have NGTD 10/16/20  # Ileus  - h/o recurrent SBO and s/p EXp. LAp  # COVID 19 negative   # Septic shock ( Hypothermia + hypotensive)- transferred to ICU since requiring pressor- source GI, The CT abd/pelvis shows nonspecific enterocolitis, noninfectious inflammatory bowel disease, or ischemic bowel, along with ileus.   # Pneumonia- HCAP vs Aspiration - on CXR 10/24 and CT chest 10/21- sputum Cx grew Methicillin resistant Staphylococcus aureus  and Stenotrophomonas maltophilia.  # S/p extubated 11/9/20  # Infected sacral ulcer- s/p debridement and culture grew Enterococcus, VRE and Candida, sensitivity is pending- The MRI of pelvis shows  Osteomyelitis of the lower sacrum and coccyx with overlying cutaneous ulceration.   # Fever- 11/8 and 11/19- BCX NGTD 11/19 - The CXR shows Improvement in bilateral airspace disease with persistent bibasilar consolidation. She is s/p removal of velazquez catheter and passed TOV.  # HCAP- 11/27/20,  Intermittent fever and CXR shows  bilateral consolidations right greater than left. - The procalcitonin level is 1.55  # s/p PICC line placement 11/30  # Persistent effusions left greater than right      would recommend:    1. Monitor Temp. and c/w supportive care   2. Continue Sacral wound care as per Surgery   3. Continue oral Linezolid  and oral fluconazole until  12/29/20  4. Aspiration precaution  5. Frequent repositioning   6. OOB to chair        Attending Attestation:    Spent more than 35 minutes on total encounter, more than 50 % of the visit was spent counseling and/or coordinating care by the Attending physician.   Patient is a 65y old  Female from home, lives with daughter, ambulates with walker with PMH of recurrent SBO's, s/p exp lap, SB resection in 2015, ex lap, ALBINA in 2018, DVT, PE, on Xarelto, IVC filter, chronic leg swelling, and anasarca, Now send in to the ER by her PCP, Dr. Ross, for evaluation of  generalized weakness and hypotension BP of 80/40 during office visit. On admission, she found to have no fever and BP was in lower normal range and no Leukocytosis. The CXR is clear and CT abd/pelvis consistent with Ileus. The ID consult requested to assist with evaluation of infectious etiology of episode of hypotension. Found to have Bacteroides bacteremia and now developed septic shock on Cefepime and Flagyl. Hence transferred to ICU. 10/20/20.    # Hypotension  at office- BP of 80/40 - resolved   #  Bacteroides fragilis Bacteremia - 10/13/20 - ? source most likely GI- Repeat Blood Cxs have NGTD 10/16/20  # Ileus  - h/o recurrent SBO and s/p EXp. LAp  # COVID 19 negative   # Septic shock ( Hypothermia + hypotensive)- transferred to ICU since requiring pressor- source GI, The CT abd/pelvis shows nonspecific enterocolitis, noninfectious inflammatory bowel disease, or ischemic bowel, along with ileus.   # Pneumonia- HCAP vs Aspiration - on CXR 10/24 and CT chest 10/21- sputum Cx grew Methicillin resistant Staphylococcus aureus  and Stenotrophomonas maltophilia.  # S/p extubated 11/9/20  # Infected sacral ulcer- s/p debridement and culture grew Enterococcus, VRE and Candida, sensitivity is pending- The MRI of pelvis shows  Osteomyelitis of the lower sacrum and coccyx with overlying cutaneous ulceration.   # Fever- 11/8 and 11/19- BCX NGTD 11/19 - The CXR shows Improvement in bilateral airspace disease with persistent bibasilar consolidation. She is s/p removal of velazquez catheter and passed TOV.  # HCAP- 11/27/20,  Intermittent fever and CXR shows  bilateral consolidations right greater than left. - The procalcitonin level is 1.55  # s/p PICC line placement 11/30  # Persistent effusions left greater than right      would recommend:    1. OOB to chair    2. Continue Sacral wound care as per Surgery   3. Continue oral Linezolid  and oral fluconazole until  12/29/20  4. Aspiration precaution  5. Frequent repositioning       Attending Attestation:    Spent more than 35 minutes on total encounter, more than 50 % of the visit was spent counseling and/or coordinating care by the Attending physician.

## 2020-12-23 NOTE — PROGRESS NOTE ADULT - PROBLEM SELECTOR PLAN 4
S/P surgical debridement. +OM  Continue wound care.  No further surgical intervention at this time  Turn every 2 hours.   Continue antibiotics thru 12/29 as per ID

## 2020-12-23 NOTE — PROGRESS NOTE ADULT - PROBLEM SELECTOR PLAN 1
Resolved.  Bacteroides fragilis per BC on 10/13  Sputum positive for MRSA and Stenotrophomonas  Aspiration pneumonia vs HCAP .   Repeat BC 10/16 and 11/19 NGTD ,UC on 11/20 NGTD   Infected sacral ulcer- s/p debridement and culture grew Enterococcus (VRE) and Candida  Leukocytosis resolved, Cdiff negative  MRI sacrum/pelvis shows Osteomyelitis  c/w abx per ID. Linezolid 600mg  q12h D5/14. Fluconazole 200mg daily until 12/29.

## 2020-12-23 NOTE — PROGRESS NOTE ADULT - REASON FOR ADMISSION
Hypotension

## 2021-01-01 ENCOUNTER — INPATIENT (INPATIENT)
Facility: HOSPITAL | Age: 66
LOS: 0 days | DRG: 871 | End: 2021-02-24
Attending: INTERNAL MEDICINE | Admitting: INTERNAL MEDICINE
Payer: MEDICAID

## 2021-01-01 VITALS
DIASTOLIC BLOOD PRESSURE: 50 MMHG | OXYGEN SATURATION: 98 % | HEART RATE: 135 BPM | RESPIRATION RATE: 16 BRPM | TEMPERATURE: 98 F | HEIGHT: 66 IN | SYSTOLIC BLOOD PRESSURE: 69 MMHG

## 2021-01-01 DIAGNOSIS — Z98.890 OTHER SPECIFIED POSTPROCEDURAL STATES: Chronic | ICD-10-CM

## 2021-01-01 DIAGNOSIS — K56.609 UNSPECIFIED INTESTINAL OBSTRUCTION, UNSPECIFIED AS TO PARTIAL VERSUS COMPLETE OBSTRUCTION: ICD-10-CM

## 2021-01-01 LAB
24R-OH-CALCIDIOL SERPL-MCNC: 18.4 NG/ML — LOW (ref 30–80)
A1C WITH ESTIMATED AVERAGE GLUCOSE RESULT: 4 % — SIGNIFICANT CHANGE UP (ref 4–5.6)
ALBUMIN SERPL ELPH-MCNC: 2.2 G/DL — LOW (ref 3.5–5)
ALBUMIN SERPL ELPH-MCNC: 2.5 G/DL — LOW (ref 3.5–5)
ALBUMIN SERPL ELPH-MCNC: 2.8 G/DL — LOW (ref 3.5–5)
ALP SERPL-CCNC: 100 U/L — SIGNIFICANT CHANGE UP (ref 40–120)
ALP SERPL-CCNC: 182 U/L — HIGH (ref 40–120)
ALP SERPL-CCNC: 99 U/L — SIGNIFICANT CHANGE UP (ref 40–120)
ALT FLD-CCNC: 45 U/L DA — SIGNIFICANT CHANGE UP (ref 10–60)
ALT FLD-CCNC: 51 U/L DA — SIGNIFICANT CHANGE UP (ref 10–60)
ALT FLD-CCNC: 56 U/L DA — SIGNIFICANT CHANGE UP (ref 10–60)
AMMONIA BLD-MCNC: 37 UMOL/L — HIGH (ref 11–32)
ANION GAP SERPL CALC-SCNC: 13 MMOL/L — SIGNIFICANT CHANGE UP (ref 5–17)
ANION GAP SERPL CALC-SCNC: 15 MMOL/L — SIGNIFICANT CHANGE UP (ref 5–17)
ANION GAP SERPL CALC-SCNC: 16 MMOL/L — SIGNIFICANT CHANGE UP (ref 5–17)
ANISOCYTOSIS BLD QL: SLIGHT — SIGNIFICANT CHANGE UP
APPEARANCE UR: CLEAR — SIGNIFICANT CHANGE UP
APTT BLD: 26.9 SEC — LOW (ref 27.5–35.5)
APTT BLD: 84.1 SEC — HIGH (ref 27.5–35.5)
AST SERPL-CCNC: 49 U/L — HIGH (ref 10–40)
AST SERPL-CCNC: 54 U/L — HIGH (ref 10–40)
AST SERPL-CCNC: 74 U/L — HIGH (ref 10–40)
BASOPHILS # BLD AUTO: 0 K/UL — SIGNIFICANT CHANGE UP (ref 0–0.2)
BASOPHILS # BLD AUTO: 0.02 K/UL — SIGNIFICANT CHANGE UP (ref 0–0.2)
BASOPHILS NFR BLD AUTO: 0 % — SIGNIFICANT CHANGE UP (ref 0–2)
BASOPHILS NFR BLD AUTO: 0.2 % — SIGNIFICANT CHANGE UP (ref 0–2)
BILIRUB SERPL-MCNC: 0.6 MG/DL — SIGNIFICANT CHANGE UP (ref 0.2–1.2)
BILIRUB SERPL-MCNC: 0.9 MG/DL — SIGNIFICANT CHANGE UP (ref 0.2–1.2)
BILIRUB SERPL-MCNC: 0.9 MG/DL — SIGNIFICANT CHANGE UP (ref 0.2–1.2)
BILIRUB UR-MCNC: ABNORMAL
BUN SERPL-MCNC: 26 MG/DL — HIGH (ref 7–18)
BUN SERPL-MCNC: 28 MG/DL — HIGH (ref 7–18)
BUN SERPL-MCNC: 31 MG/DL — HIGH (ref 7–18)
CALCIUM SERPL-MCNC: 7.2 MG/DL — LOW (ref 8.4–10.5)
CALCIUM SERPL-MCNC: 7.9 MG/DL — LOW (ref 8.4–10.5)
CALCIUM SERPL-MCNC: 8.6 MG/DL — SIGNIFICANT CHANGE UP (ref 8.4–10.5)
CHLORIDE SERPL-SCNC: 105 MMOL/L — SIGNIFICANT CHANGE UP (ref 96–108)
CHLORIDE SERPL-SCNC: 111 MMOL/L — HIGH (ref 96–108)
CHLORIDE SERPL-SCNC: 112 MMOL/L — HIGH (ref 96–108)
CHOLEST SERPL-MCNC: 77 MG/DL — SIGNIFICANT CHANGE UP
CO2 SERPL-SCNC: 18 MMOL/L — LOW (ref 22–31)
COLOR SPEC: ABNORMAL
CREAT SERPL-MCNC: 1.04 MG/DL — SIGNIFICANT CHANGE UP (ref 0.5–1.3)
CREAT SERPL-MCNC: 1.11 MG/DL — SIGNIFICANT CHANGE UP (ref 0.5–1.3)
CREAT SERPL-MCNC: 1.16 MG/DL — SIGNIFICANT CHANGE UP (ref 0.5–1.3)
CULTURE RESULTS: NO GROWTH — SIGNIFICANT CHANGE UP
DIFF PNL FLD: ABNORMAL
EOSINOPHIL # BLD AUTO: 0 K/UL — SIGNIFICANT CHANGE UP (ref 0–0.5)
EOSINOPHIL # BLD AUTO: 0.03 K/UL — SIGNIFICANT CHANGE UP (ref 0–0.5)
EOSINOPHIL NFR BLD AUTO: 0 % — SIGNIFICANT CHANGE UP (ref 0–6)
EOSINOPHIL NFR BLD AUTO: 0.2 % — SIGNIFICANT CHANGE UP (ref 0–6)
ESTIMATED AVERAGE GLUCOSE: 68 MG/DL — SIGNIFICANT CHANGE UP (ref 68–114)
FERRITIN SERPL-MCNC: 246 NG/ML — HIGH (ref 15–150)
FOLATE SERPL-MCNC: 19.8 NG/ML — SIGNIFICANT CHANGE UP
GLUCOSE BLDC GLUCOMTR-MCNC: 100 MG/DL — HIGH (ref 70–99)
GLUCOSE BLDC GLUCOMTR-MCNC: 60 MG/DL — LOW (ref 70–99)
GLUCOSE SERPL-MCNC: 148 MG/DL — HIGH (ref 70–99)
GLUCOSE SERPL-MCNC: 52 MG/DL — CRITICAL LOW (ref 70–99)
GLUCOSE SERPL-MCNC: 86 MG/DL — SIGNIFICANT CHANGE UP (ref 70–99)
GLUCOSE UR QL: NEGATIVE — SIGNIFICANT CHANGE UP
HCT VFR BLD CALC: 26.1 % — LOW (ref 34.5–45)
HCT VFR BLD CALC: 27.9 % — LOW (ref 34.5–45)
HCT VFR BLD CALC: 36.2 % — SIGNIFICANT CHANGE UP (ref 34.5–45)
HDLC SERPL-MCNC: 44 MG/DL — LOW
HGB BLD-MCNC: 11 G/DL — LOW (ref 11.5–15.5)
HGB BLD-MCNC: 7.9 G/DL — LOW (ref 11.5–15.5)
HGB BLD-MCNC: 8.5 G/DL — LOW (ref 11.5–15.5)
HYPOCHROMIA BLD QL: SLIGHT — SIGNIFICANT CHANGE UP
HYPOSEGMENTATION: PRESENT — SIGNIFICANT CHANGE UP
IMM GRANULOCYTES NFR BLD AUTO: 0.7 % — SIGNIFICANT CHANGE UP (ref 0–1.5)
INR BLD: 1.12 RATIO — SIGNIFICANT CHANGE UP (ref 0.88–1.16)
INR BLD: 2.93 RATIO — HIGH (ref 0.88–1.16)
IRON SATN MFR SERPL: 4 % — LOW (ref 15–50)
IRON SATN MFR SERPL: 8 UG/DL — LOW (ref 40–150)
KETONES UR-MCNC: ABNORMAL
LACTATE SERPL-SCNC: 4.4 MMOL/L — CRITICAL HIGH (ref 0.7–2)
LACTATE SERPL-SCNC: 4.8 MMOL/L — CRITICAL HIGH (ref 0.7–2)
LACTATE SERPL-SCNC: 6.1 MMOL/L — CRITICAL HIGH (ref 0.7–2)
LACTATE SERPL-SCNC: 8.5 MMOL/L — CRITICAL HIGH (ref 0.7–2)
LACTATE SERPL-SCNC: 9 MMOL/L — CRITICAL HIGH (ref 0.7–2)
LEUKOCYTE ESTERASE UR-ACNC: ABNORMAL
LG PLATELETS BLD QL AUTO: SLIGHT — SIGNIFICANT CHANGE UP
LIDOCAIN IGE QN: 48 U/L — LOW (ref 73–393)
LIPID PNL WITH DIRECT LDL SERPL: 18 MG/DL — SIGNIFICANT CHANGE UP
LYMPHOCYTES # BLD AUTO: 0.38 K/UL — LOW (ref 1–3.3)
LYMPHOCYTES # BLD AUTO: 0.74 K/UL — LOW (ref 1–3.3)
LYMPHOCYTES # BLD AUTO: 12 % — LOW (ref 13–44)
LYMPHOCYTES # BLD AUTO: 5.8 % — LOW (ref 13–44)
MACROCYTES BLD QL: SLIGHT — SIGNIFICANT CHANGE UP
MAGNESIUM SERPL-MCNC: 1.5 MG/DL — LOW (ref 1.6–2.6)
MAGNESIUM SERPL-MCNC: 2.3 MG/DL — SIGNIFICANT CHANGE UP (ref 1.6–2.6)
MANUAL SMEAR VERIFICATION: SIGNIFICANT CHANGE UP
MCHC RBC-ENTMCNC: 30.3 GM/DL — LOW (ref 32–36)
MCHC RBC-ENTMCNC: 30.4 GM/DL — LOW (ref 32–36)
MCHC RBC-ENTMCNC: 30.4 PG — SIGNIFICANT CHANGE UP (ref 27–34)
MCHC RBC-ENTMCNC: 30.5 GM/DL — LOW (ref 32–36)
MCHC RBC-ENTMCNC: 30.6 PG — SIGNIFICANT CHANGE UP (ref 27–34)
MCHC RBC-ENTMCNC: 30.6 PG — SIGNIFICANT CHANGE UP (ref 27–34)
MCV RBC AUTO: 100 FL — SIGNIFICANT CHANGE UP (ref 80–100)
MCV RBC AUTO: 100.4 FL — HIGH (ref 80–100)
MCV RBC AUTO: 101.2 FL — HIGH (ref 80–100)
METAMYELOCYTES # FLD: 1 % — HIGH (ref 0–0)
MONOCYTES # BLD AUTO: 0.31 K/UL — SIGNIFICANT CHANGE UP (ref 0–0.9)
MONOCYTES # BLD AUTO: 1.12 K/UL — HIGH (ref 0–0.9)
MONOCYTES NFR BLD AUTO: 10 % — SIGNIFICANT CHANGE UP (ref 2–14)
MONOCYTES NFR BLD AUTO: 8.7 % — SIGNIFICANT CHANGE UP (ref 2–14)
MRSA PCR RESULT.: SIGNIFICANT CHANGE UP
MYELOCYTES NFR BLD: 2 % — HIGH (ref 0–0)
NEUTROPHILS # BLD AUTO: 1.48 K/UL — LOW (ref 1.8–7.4)
NEUTROPHILS # BLD AUTO: 10.86 K/UL — HIGH (ref 1.8–7.4)
NEUTROPHILS NFR BLD AUTO: 17 % — LOW (ref 43–77)
NEUTROPHILS NFR BLD AUTO: 84.4 % — HIGH (ref 43–77)
NEUTS BAND # BLD: 14 % — HIGH (ref 0–8)
NITRITE UR-MCNC: POSITIVE
NON HDL CHOLESTEROL: 33 MG/DL — SIGNIFICANT CHANGE UP
NRBC # BLD: 0 /100 WBCS — SIGNIFICANT CHANGE UP (ref 0–0)
NRBC # BLD: 0 /100 WBCS — SIGNIFICANT CHANGE UP (ref 0–0)
NRBC # BLD: 0 /100 — SIGNIFICANT CHANGE UP (ref 0–0)
NT-PROBNP SERPL-SCNC: HIGH PG/ML (ref 0–125)
OVALOCYTES BLD QL SMEAR: SLIGHT — SIGNIFICANT CHANGE UP
PH UR: 5 — SIGNIFICANT CHANGE UP (ref 5–8)
PHOSPHATE SERPL-MCNC: 6 MG/DL — HIGH (ref 2.5–4.5)
PHOSPHATE SERPL-MCNC: 6.4 MG/DL — HIGH (ref 2.5–4.5)
PLAT MORPH BLD: NORMAL — SIGNIFICANT CHANGE UP
PLATELET # BLD AUTO: 250 K/UL — SIGNIFICANT CHANGE UP (ref 150–400)
PLATELET # BLD AUTO: 281 K/UL — SIGNIFICANT CHANGE UP (ref 150–400)
PLATELET # BLD AUTO: 403 K/UL — HIGH (ref 150–400)
PLATELET COUNT - ESTIMATE: NORMAL — SIGNIFICANT CHANGE UP
POIKILOCYTOSIS BLD QL AUTO: SLIGHT — SIGNIFICANT CHANGE UP
POLYCHROMASIA BLD QL SMEAR: SLIGHT — SIGNIFICANT CHANGE UP
POTASSIUM SERPL-MCNC: 4.7 MMOL/L — SIGNIFICANT CHANGE UP (ref 3.5–5.3)
POTASSIUM SERPL-MCNC: 4.7 MMOL/L — SIGNIFICANT CHANGE UP (ref 3.5–5.3)
POTASSIUM SERPL-MCNC: 5.3 MMOL/L — SIGNIFICANT CHANGE UP (ref 3.5–5.3)
POTASSIUM SERPL-SCNC: 4.7 MMOL/L — SIGNIFICANT CHANGE UP (ref 3.5–5.3)
POTASSIUM SERPL-SCNC: 4.7 MMOL/L — SIGNIFICANT CHANGE UP (ref 3.5–5.3)
POTASSIUM SERPL-SCNC: 5.3 MMOL/L — SIGNIFICANT CHANGE UP (ref 3.5–5.3)
PROT SERPL-MCNC: 5.7 G/DL — LOW (ref 6–8.3)
PROT SERPL-MCNC: 6.3 G/DL — SIGNIFICANT CHANGE UP (ref 6–8.3)
PROT SERPL-MCNC: 7.5 G/DL — SIGNIFICANT CHANGE UP (ref 6–8.3)
PROT UR-MCNC: 100
PROTHROM AB SERPL-ACNC: 13.3 SEC — SIGNIFICANT CHANGE UP (ref 10.6–13.6)
PROTHROM AB SERPL-ACNC: 33.1 SEC — HIGH (ref 10.6–13.6)
RAPID RVP RESULT: SIGNIFICANT CHANGE UP
RBC # BLD: 2.58 M/UL — LOW (ref 3.8–5.2)
RBC # BLD: 2.78 M/UL — LOW (ref 3.8–5.2)
RBC # BLD: 3.62 M/UL — LOW (ref 3.8–5.2)
RBC # FLD: 18.6 % — HIGH (ref 10.3–14.5)
RBC # FLD: 18.7 % — HIGH (ref 10.3–14.5)
RBC # FLD: 18.9 % — HIGH (ref 10.3–14.5)
RBC BLD AUTO: ABNORMAL
S AUREUS DNA NOSE QL NAA+PROBE: SIGNIFICANT CHANGE UP
SARS-COV-2 IGG SERPL QL IA: NEGATIVE — SIGNIFICANT CHANGE UP
SARS-COV-2 IGM SERPL IA-ACNC: 0.08 INDEX — SIGNIFICANT CHANGE UP
SARS-COV-2 RNA SPEC QL NAA+PROBE: SIGNIFICANT CHANGE UP
SODIUM SERPL-SCNC: 139 MMOL/L — SIGNIFICANT CHANGE UP (ref 135–145)
SODIUM SERPL-SCNC: 143 MMOL/L — SIGNIFICANT CHANGE UP (ref 135–145)
SODIUM SERPL-SCNC: 144 MMOL/L — SIGNIFICANT CHANGE UP (ref 135–145)
SP GR SPEC: 1.02 — SIGNIFICANT CHANGE UP (ref 1.01–1.02)
SPECIMEN SOURCE: SIGNIFICANT CHANGE UP
TARGETS BLD QL SMEAR: SLIGHT — SIGNIFICANT CHANGE UP
TIBC SERPL-MCNC: 215 UG/DL — LOW (ref 250–450)
TRANSFERRIN SERPL-MCNC: 171 MG/DL — LOW (ref 200–360)
TRIGL SERPL-MCNC: 73 MG/DL — SIGNIFICANT CHANGE UP
TROPONIN I SERPL-MCNC: <0.015 NG/ML — SIGNIFICANT CHANGE UP (ref 0–0.04)
TROPONIN I SERPL-MCNC: <0.015 NG/ML — SIGNIFICANT CHANGE UP (ref 0–0.04)
TSH SERPL-MCNC: 2.34 UU/ML — SIGNIFICANT CHANGE UP (ref 0.34–4.82)
UIBC SERPL-MCNC: 207 UG/DL — SIGNIFICANT CHANGE UP (ref 110–370)
UROBILINOGEN FLD QL: 4
VIT B12 SERPL-MCNC: 532 PG/ML — SIGNIFICANT CHANGE UP (ref 232–1245)
WBC # BLD: 12.86 K/UL — HIGH (ref 3.8–10.5)
WBC # BLD: 2.26 K/UL — LOW (ref 3.8–10.5)
WBC # BLD: 3.14 K/UL — LOW (ref 3.8–10.5)
WBC # FLD AUTO: 12.86 K/UL — HIGH (ref 3.8–10.5)
WBC # FLD AUTO: 2.26 K/UL — LOW (ref 3.8–10.5)
WBC # FLD AUTO: 3.14 K/UL — LOW (ref 3.8–10.5)

## 2021-01-01 PROCEDURE — 71275 CT ANGIOGRAPHY CHEST: CPT

## 2021-01-01 PROCEDURE — 82140 ASSAY OF AMMONIA: CPT

## 2021-01-01 PROCEDURE — 83540 ASSAY OF IRON: CPT

## 2021-01-01 PROCEDURE — 85027 COMPLETE CBC AUTOMATED: CPT

## 2021-01-01 PROCEDURE — 74177 CT ABD & PELVIS W/CONTRAST: CPT

## 2021-01-01 PROCEDURE — 96366 THER/PROPH/DIAG IV INF ADDON: CPT

## 2021-01-01 PROCEDURE — 82607 VITAMIN B-12: CPT

## 2021-01-01 PROCEDURE — 86769 SARS-COV-2 COVID-19 ANTIBODY: CPT

## 2021-01-01 PROCEDURE — 86900 BLOOD TYPING SEROLOGIC ABO: CPT

## 2021-01-01 PROCEDURE — 96375 TX/PRO/DX INJ NEW DRUG ADDON: CPT

## 2021-01-01 PROCEDURE — 80061 LIPID PANEL: CPT

## 2021-01-01 PROCEDURE — 99285 EMERGENCY DEPT VISIT HI MDM: CPT

## 2021-01-01 PROCEDURE — 84443 ASSAY THYROID STIM HORMONE: CPT

## 2021-01-01 PROCEDURE — 86850 RBC ANTIBODY SCREEN: CPT

## 2021-01-01 PROCEDURE — 99291 CRITICAL CARE FIRST HOUR: CPT

## 2021-01-01 PROCEDURE — 87641 MR-STAPH DNA AMP PROBE: CPT

## 2021-01-01 PROCEDURE — 71045 X-RAY EXAM CHEST 1 VIEW: CPT | Mod: 26

## 2021-01-01 PROCEDURE — 74177 CT ABD & PELVIS W/CONTRAST: CPT | Mod: 26

## 2021-01-01 PROCEDURE — 81001 URINALYSIS AUTO W/SCOPE: CPT

## 2021-01-01 PROCEDURE — 71045 X-RAY EXAM CHEST 1 VIEW: CPT

## 2021-01-01 PROCEDURE — 85730 THROMBOPLASTIN TIME PARTIAL: CPT

## 2021-01-01 PROCEDURE — 82962 GLUCOSE BLOOD TEST: CPT

## 2021-01-01 PROCEDURE — 84484 ASSAY OF TROPONIN QUANT: CPT

## 2021-01-01 PROCEDURE — 83550 IRON BINDING TEST: CPT

## 2021-01-01 PROCEDURE — 82728 ASSAY OF FERRITIN: CPT

## 2021-01-01 PROCEDURE — 0225U NFCT DS DNA&RNA 21 SARSCOV2: CPT

## 2021-01-01 PROCEDURE — 87040 BLOOD CULTURE FOR BACTERIA: CPT

## 2021-01-01 PROCEDURE — 87640 STAPH A DNA AMP PROBE: CPT

## 2021-01-01 PROCEDURE — 86901 BLOOD TYPING SEROLOGIC RH(D): CPT

## 2021-01-01 PROCEDURE — 83880 ASSAY OF NATRIURETIC PEPTIDE: CPT

## 2021-01-01 PROCEDURE — 82746 ASSAY OF FOLIC ACID SERUM: CPT

## 2021-01-01 PROCEDURE — 84100 ASSAY OF PHOSPHORUS: CPT

## 2021-01-01 PROCEDURE — 99222 1ST HOSP IP/OBS MODERATE 55: CPT

## 2021-01-01 PROCEDURE — 83605 ASSAY OF LACTIC ACID: CPT

## 2021-01-01 PROCEDURE — 93005 ELECTROCARDIOGRAM TRACING: CPT

## 2021-01-01 PROCEDURE — 80053 COMPREHEN METABOLIC PANEL: CPT

## 2021-01-01 PROCEDURE — 85025 COMPLETE CBC W/AUTO DIFF WBC: CPT

## 2021-01-01 PROCEDURE — 87086 URINE CULTURE/COLONY COUNT: CPT

## 2021-01-01 PROCEDURE — 83735 ASSAY OF MAGNESIUM: CPT

## 2021-01-01 PROCEDURE — 96365 THER/PROPH/DIAG IV INF INIT: CPT

## 2021-01-01 PROCEDURE — 82306 VITAMIN D 25 HYDROXY: CPT

## 2021-01-01 PROCEDURE — 99233 SBSQ HOSP IP/OBS HIGH 50: CPT

## 2021-01-01 PROCEDURE — 83690 ASSAY OF LIPASE: CPT

## 2021-01-01 PROCEDURE — 71275 CT ANGIOGRAPHY CHEST: CPT | Mod: 26

## 2021-01-01 PROCEDURE — 85610 PROTHROMBIN TIME: CPT

## 2021-01-01 PROCEDURE — 84466 ASSAY OF TRANSFERRIN: CPT

## 2021-01-01 PROCEDURE — 36415 COLL VENOUS BLD VENIPUNCTURE: CPT

## 2021-01-01 PROCEDURE — 83036 HEMOGLOBIN GLYCOSYLATED A1C: CPT

## 2021-01-01 RX ORDER — SODIUM CHLORIDE 9 MG/ML
1000 INJECTION INTRAMUSCULAR; INTRAVENOUS; SUBCUTANEOUS
Refills: 0 | Status: DISCONTINUED | OUTPATIENT
Start: 2021-01-01 | End: 2021-01-01

## 2021-01-01 RX ORDER — ONDANSETRON 8 MG/1
4 TABLET, FILM COATED ORAL EVERY 4 HOURS
Refills: 0 | Status: DISCONTINUED | OUTPATIENT
Start: 2021-01-01 | End: 2021-01-01

## 2021-01-01 RX ORDER — PIPERACILLIN AND TAZOBACTAM 4; .5 G/20ML; G/20ML
3.38 INJECTION, POWDER, LYOPHILIZED, FOR SOLUTION INTRAVENOUS ONCE
Refills: 0 | Status: COMPLETED | OUTPATIENT
Start: 2021-01-01 | End: 2021-01-01

## 2021-01-01 RX ORDER — VANCOMYCIN HCL 1 G
750 VIAL (EA) INTRAVENOUS
Refills: 0 | Status: DISCONTINUED | OUTPATIENT
Start: 2021-01-01 | End: 2021-01-01

## 2021-01-01 RX ORDER — ETOMIDATE 2 MG/ML
20 INJECTION INTRAVENOUS ONCE
Refills: 0 | Status: DISCONTINUED | OUTPATIENT
Start: 2021-01-01 | End: 2021-01-01

## 2021-01-01 RX ORDER — KETOROLAC TROMETHAMINE 30 MG/ML
30 SYRINGE (ML) INJECTION ONCE
Refills: 0 | Status: DISCONTINUED | OUTPATIENT
Start: 2021-01-01 | End: 2021-01-01

## 2021-01-01 RX ORDER — PIPERACILLIN AND TAZOBACTAM 4; .5 G/20ML; G/20ML
3.38 INJECTION, POWDER, LYOPHILIZED, FOR SOLUTION INTRAVENOUS EVERY 8 HOURS
Refills: 0 | Status: DISCONTINUED | OUTPATIENT
Start: 2021-01-01 | End: 2021-01-01

## 2021-01-01 RX ORDER — MAGNESIUM SULFATE 500 MG/ML
2 VIAL (ML) INJECTION ONCE
Refills: 0 | Status: COMPLETED | OUTPATIENT
Start: 2021-01-01 | End: 2021-01-01

## 2021-01-01 RX ORDER — VANCOMYCIN HCL 1 G
1000 VIAL (EA) INTRAVENOUS ONCE
Refills: 0 | Status: COMPLETED | OUTPATIENT
Start: 2021-01-01 | End: 2021-01-01

## 2021-01-01 RX ORDER — SODIUM CHLORIDE 9 MG/ML
1000 INJECTION INTRAMUSCULAR; INTRAVENOUS; SUBCUTANEOUS ONCE
Refills: 0 | Status: COMPLETED | OUTPATIENT
Start: 2021-01-01 | End: 2021-01-01

## 2021-01-01 RX ORDER — SODIUM CHLORIDE 9 MG/ML
2000 INJECTION INTRAMUSCULAR; INTRAVENOUS; SUBCUTANEOUS ONCE
Refills: 0 | Status: COMPLETED | OUTPATIENT
Start: 2021-01-01 | End: 2021-01-01

## 2021-01-01 RX ORDER — PHENYLEPHRINE HYDROCHLORIDE 10 MG/ML
3 INJECTION INTRAVENOUS
Qty: 40 | Refills: 0 | Status: DISCONTINUED | OUTPATIENT
Start: 2021-01-01 | End: 2021-01-01

## 2021-01-01 RX ORDER — CHLORHEXIDINE GLUCONATE 213 G/1000ML
1 SOLUTION TOPICAL
Refills: 0 | Status: DISCONTINUED | OUTPATIENT
Start: 2021-01-01 | End: 2021-01-01

## 2021-01-01 RX ORDER — SODIUM CHLORIDE 9 MG/ML
1000 INJECTION INTRAMUSCULAR; INTRAVENOUS; SUBCUTANEOUS ONCE
Refills: 0 | Status: DISCONTINUED | OUTPATIENT
Start: 2021-01-01 | End: 2021-01-01

## 2021-01-01 RX ORDER — ENOXAPARIN SODIUM 100 MG/ML
55 INJECTION SUBCUTANEOUS
Refills: 0 | Status: DISCONTINUED | OUTPATIENT
Start: 2021-01-01 | End: 2021-01-01

## 2021-01-01 RX ORDER — VANCOMYCIN HCL 1 G
1000 VIAL (EA) INTRAVENOUS
Refills: 0 | Status: DISCONTINUED | OUTPATIENT
Start: 2021-01-01 | End: 2021-01-01

## 2021-01-01 RX ORDER — SODIUM CHLORIDE 9 MG/ML
500 INJECTION INTRAMUSCULAR; INTRAVENOUS; SUBCUTANEOUS ONCE
Refills: 0 | Status: COMPLETED | OUTPATIENT
Start: 2021-01-01 | End: 2021-01-01

## 2021-01-01 RX ORDER — PANTOPRAZOLE SODIUM 20 MG/1
40 TABLET, DELAYED RELEASE ORAL DAILY
Refills: 0 | Status: DISCONTINUED | OUTPATIENT
Start: 2021-01-01 | End: 2021-01-01

## 2021-01-01 RX ORDER — SUCCINYLCHOLINE CHLORIDE 100 MG/5ML
100 SYRINGE (ML) INTRAVENOUS ONCE
Refills: 0 | Status: COMPLETED | OUTPATIENT
Start: 2021-01-01 | End: 2021-01-01

## 2021-01-01 RX ORDER — KETOROLAC TROMETHAMINE 30 MG/ML
30 SYRINGE (ML) INJECTION EVERY 8 HOURS
Refills: 0 | Status: DISCONTINUED | OUTPATIENT
Start: 2021-01-01 | End: 2021-01-01

## 2021-01-01 RX ORDER — DEXTROSE 50 % IN WATER 50 %
50 SYRINGE (ML) INTRAVENOUS ONCE
Refills: 0 | Status: COMPLETED | OUTPATIENT
Start: 2021-01-01 | End: 2021-01-01

## 2021-01-01 RX ORDER — ONDANSETRON 8 MG/1
4 TABLET, FILM COATED ORAL ONCE
Refills: 0 | Status: COMPLETED | OUTPATIENT
Start: 2021-01-01 | End: 2021-01-01

## 2021-01-01 RX ORDER — MORPHINE SULFATE 50 MG/1
2 CAPSULE, EXTENDED RELEASE ORAL ONCE
Refills: 0 | Status: DISCONTINUED | OUTPATIENT
Start: 2021-01-01 | End: 2021-01-01

## 2021-01-01 RX ORDER — PROPOFOL 10 MG/ML
5 INJECTION, EMULSION INTRAVENOUS
Qty: 1000 | Refills: 0 | Status: DISCONTINUED | OUTPATIENT
Start: 2021-01-01 | End: 2021-01-01

## 2021-01-01 RX ADMIN — Medication 1000 MILLIGRAM(S): at 11:23

## 2021-01-01 RX ADMIN — SODIUM CHLORIDE 2000 MILLILITER(S): 9 INJECTION INTRAMUSCULAR; INTRAVENOUS; SUBCUTANEOUS at 11:26

## 2021-01-01 RX ADMIN — SODIUM CHLORIDE 1000 MILLILITER(S): 9 INJECTION INTRAMUSCULAR; INTRAVENOUS; SUBCUTANEOUS at 08:48

## 2021-01-01 RX ADMIN — SODIUM CHLORIDE 2000 MILLILITER(S): 9 INJECTION INTRAMUSCULAR; INTRAVENOUS; SUBCUTANEOUS at 11:00

## 2021-01-01 RX ADMIN — PIPERACILLIN AND TAZOBACTAM 200 GRAM(S): 4; .5 INJECTION, POWDER, LYOPHILIZED, FOR SOLUTION INTRAVENOUS at 06:38

## 2021-01-01 RX ADMIN — SODIUM CHLORIDE 1000 MILLILITER(S): 9 INJECTION INTRAMUSCULAR; INTRAVENOUS; SUBCUTANEOUS at 06:38

## 2021-01-01 RX ADMIN — MORPHINE SULFATE 2 MILLIGRAM(S): 50 CAPSULE, EXTENDED RELEASE ORAL at 12:00

## 2021-01-01 RX ADMIN — Medication 100 MILLIGRAM(S): at 12:00

## 2021-01-01 RX ADMIN — Medication 50 GRAM(S): at 03:05

## 2021-01-01 RX ADMIN — Medication 30 MILLIGRAM(S): at 22:35

## 2021-01-01 RX ADMIN — SODIUM CHLORIDE 100 MILLILITER(S): 9 INJECTION INTRAMUSCULAR; INTRAVENOUS; SUBCUTANEOUS at 17:50

## 2021-01-01 RX ADMIN — SODIUM CHLORIDE 2000 MILLILITER(S): 9 INJECTION INTRAMUSCULAR; INTRAVENOUS; SUBCUTANEOUS at 06:44

## 2021-01-01 RX ADMIN — Medication 250 MILLIGRAM(S): at 22:27

## 2021-01-01 RX ADMIN — SODIUM CHLORIDE 80 MILLILITER(S): 9 INJECTION INTRAMUSCULAR; INTRAVENOUS; SUBCUTANEOUS at 08:58

## 2021-01-01 RX ADMIN — Medication 50 MILLILITER(S): at 08:52

## 2021-01-01 RX ADMIN — Medication 250 MILLIGRAM(S): at 10:03

## 2021-01-01 RX ADMIN — PANTOPRAZOLE SODIUM 40 MILLIGRAM(S): 20 TABLET, DELAYED RELEASE ORAL at 11:30

## 2021-01-01 RX ADMIN — PHENYLEPHRINE HYDROCHLORIDE 67.4 MICROGRAM(S)/KG/MIN: 10 INJECTION INTRAVENOUS at 09:00

## 2021-01-01 RX ADMIN — PIPERACILLIN AND TAZOBACTAM 25 GRAM(S): 4; .5 INJECTION, POWDER, LYOPHILIZED, FOR SOLUTION INTRAVENOUS at 04:23

## 2021-01-01 RX ADMIN — CHLORHEXIDINE GLUCONATE 1 APPLICATION(S): 213 SOLUTION TOPICAL at 04:24

## 2021-01-01 RX ADMIN — PHENYLEPHRINE HYDROCHLORIDE 2.25 MICROGRAM(S)/KG/MIN: 10 INJECTION INTRAVENOUS at 03:05

## 2021-01-01 RX ADMIN — ONDANSETRON 4 MILLIGRAM(S): 8 TABLET, FILM COATED ORAL at 07:21

## 2021-01-01 RX ADMIN — Medication 250 MILLIGRAM(S): at 07:09

## 2021-01-01 RX ADMIN — ENOXAPARIN SODIUM 55 MILLIGRAM(S): 100 INJECTION SUBCUTANEOUS at 17:50

## 2021-01-01 RX ADMIN — SODIUM CHLORIDE 2000 MILLILITER(S): 9 INJECTION INTRAMUSCULAR; INTRAVENOUS; SUBCUTANEOUS at 08:55

## 2021-01-01 RX ADMIN — SODIUM CHLORIDE 1000 MILLILITER(S): 9 INJECTION INTRAMUSCULAR; INTRAVENOUS; SUBCUTANEOUS at 15:31

## 2021-01-01 RX ADMIN — PIPERACILLIN AND TAZOBACTAM 25 GRAM(S): 4; .5 INJECTION, POWDER, LYOPHILIZED, FOR SOLUTION INTRAVENOUS at 22:30

## 2021-01-01 RX ADMIN — Medication 30 MILLIGRAM(S): at 08:57

## 2021-01-01 RX ADMIN — SODIUM CHLORIDE 500 MILLILITER(S): 9 INJECTION INTRAMUSCULAR; INTRAVENOUS; SUBCUTANEOUS at 08:15

## 2021-01-01 RX ADMIN — PHENYLEPHRINE HYDROCHLORIDE 2.25 MICROGRAM(S)/KG/MIN: 10 INJECTION INTRAVENOUS at 21:16

## 2021-01-01 RX ADMIN — SODIUM CHLORIDE 500 MILLILITER(S): 9 INJECTION INTRAMUSCULAR; INTRAVENOUS; SUBCUTANEOUS at 10:04

## 2021-01-01 NOTE — DISCHARGE NOTE ADULT - MEDICATION SUMMARY - MEDICATIONS TO TAKE
I will START or STAY ON the medications listed below when I get home from the hospital:    Xarelto 15 mg oral tablet  -- 1 tab(s) by mouth once a day (in the evening)   -- Check with your doctor before becoming pregnant.  It is very important that you take or use this exactly as directed.  Do not skip doses or discontinue unless directed by your doctor.  Obtain medical advice before taking any non-prescription drugs as some may affect the action of this medication.  Take with food.    -- Indication: For Pulmonary embolism    Linzess 145 mcg oral capsule  -- 1 cap(s) by mouth once a day  -- Indication: For constipation    ferrous sulfate 325 mg (65 mg elemental iron) oral tablet  -- 1 tab(s) by mouth once a day   -- Check with your doctor before becoming pregnant.  Do not chew, break, or crush.  May discolor urine or feces.    -- Indication: For anemia     pantoprazole 40 mg oral delayed release tablet  -- 1 tab(s) by mouth once a day  for 4 week   -- Indication: For gerd     Vitamin D2 50,000 intl units (1.25 mg) oral capsule  -- orally once a month  -- Indication: For vitamin     folic acid 0.4 mg oral tablet  -- 1 tab(s) by mouth once a day   -- Indication: For vitamin I will START or STAY ON the medications listed below when I get home from the hospital:    Xarelto 15 mg oral tablet  -- 1 tab(s) by mouth 2 times a day   -- Check with your doctor before becoming pregnant.  It is very important that you take or use this exactly as directed.  Do not skip doses or discontinue unless directed by your doctor.  Obtain medical advice before taking any non-prescription drugs as some may affect the action of this medication.  Take with food.    -- Indication: For Pulmonary embolism    Linzess 145 mcg oral capsule  -- 1 cap(s) by mouth once a day  -- Indication: For constipation    ferrous sulfate 325 mg (65 mg elemental iron) oral tablet  -- 1 tab(s) by mouth once a day   -- Check with your doctor before becoming pregnant.  Do not chew, break, or crush.  May discolor urine or feces.    -- Indication: For anemia     pantoprazole 40 mg oral delayed release tablet  -- 1 tab(s) by mouth once a day  for 4 week   -- Indication: For gerd     folic acid 0.4 mg oral tablet  -- 1 tab(s) by mouth once a day   -- Indication: For vitamin     Vitamin D2 50,000 intl units (1.25 mg) oral capsule  -- orally once a month  -- Indication: For vitamin 0

## 2021-02-23 NOTE — CONSULT NOTE ADULT - SUBJECTIVE AND OBJECTIVE BOX
GENERAL SURGERY CONSULT NOTE  65y old  Female who presents with a chief complaint of distended abdomen and hypotension (2021 10:55)    HPI:   65y Female from [facility], AAO x3,  with PMH of recurrent SBO's, s/p exp lap, SB resection in , ex lap, ALBINA in 2018, DVT, PE, on Xarelto, IVC filter, decubitus ulcer with OM of sacrum in the past, chronic leg swelling, and anasarca in the past, came in to the ED presents from NH with shortness of breath, hypotension and vomiting since yesterday with associated increased abdominal distention and abdominal pain since yesterday. Last BM yesterday, denies passing flatus since yesterday. Reports abdominal discomfort. This AM NH staff noted low blood pressure thus sent her to ED for evaluation. Denies fevers, cough, chest pain, diarrhea or urinary symptoms. Pt had a prolonged hospitalization, discharged in Dec 2020, admitted for septic shock with Bacteroides fragilis Bacteremia most likely GI source, requiring intubation and pressors, sacral ulcer with OM of sacrum, HCAP, pt treated with Linezolid and fluconazole. Pt was hospitalized from Oct to Dec 2020, discharged to [facility].     Pt was seen and examined at bedside, reports nausea and vomiting yesterday since 3pm. Pt has been in the hospital multiple times in the past for the same problem. 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 Pt reports her abdominal pain has improved and states she does not want surgery at this time. Pt asking us to call and speak to Dr. Ross.    PAST MEDICAL & SURGICAL HISTORY:  Anasarca  Pulmonary embolism  DVT (deep venous thrombosis)  SBO (small bowel obstruction)  ex-lap  H/O exploratory laparotomy    Review of Systems:  please see HPI    MEDICATIONS  (STANDING):  sodium chloride 0.9%. 1000 milliLiter(s) (80 mL/Hr) IV Continuous <Continuous>    Allergies: No Known Allergies    FAMILY HISTORY:  No pertinent family history in first degree relatives      Vital Signs Last 24 Hrs  T(C): 36.9 (2021 11:58), Max: 36.9 (2021 11:58)  T(F): 98.5 (2021 11:58), Max: 98.5 (2021 11:58)  HR: 99 (2021 11:58) (99 - 135)  BP: 92/59 (2021 11:58) (69/50 - 101/62)  BP(mean): --  RR: 18 (2021 11:58) (16 - 18)  SpO2: 99% (2021 11:58) (98% - 100%)    Physical Exam:    General:  Appears stated age, NAD  HEENT: NGT in place to wall suction with 400cc brown output  Chest:  non-labored breathing  CV: S1S2  Abdomen: firm, distended, tender on palpation; midline well-healed incision noted  Skin:  No rash  Neuro/Psych:  Alert, oriented tp time, place and person       LABS:                        11.0   12.86 )-----------( 250      ( 2021 06:42 )             36.2         139  |  105  |  26<H>  ----------------------------<  148<H>  5.3   |  18<L>  |  1.16    Ca    8.6      2021 06:42    TPro  7.5  /  Alb  2.8<L>  /  TBili  0.9  /  DBili  x   /  AST  54<H>  /  ALT  56  /  AlkPhos  182<H>    PT/INR - ( 2021 07:28 )   PT: 33.1 sec;   INR: 2.93 ratio    PTT - ( 2021 07:28 )  PTT:84.1 sec    Urinalysis Basic - ( 2021 08:44 )  Color: Sue / Appearance: Clear / S.025 / pH: x  Gluc: x / Ketone: Trace  / Bili: Moderate / Urobili: 4   Blood: x / Protein: 100 / Nitrite: Positive   Leuk Esterase: Small / RBC: 2-5 /HPF / WBC 6-10 /HPF   Sq Epi: x / Non Sq Epi: Negative /HPF / Bacteria: Moderate /HPF    RADIOLOGY & ADDITIONAL STUDIES:  < from: CT Angio Chest w/ IV Cont (21 @ 10:03) >  FINDINGS:  CHEST:  LUNGS AND LARGE AIRWAYS: Patent central airways. No pulmonary nodules. Bilateral lower lobe consolidation,greater on the left, secondary to atelectasis and/or infiltrate.  PLEURA: Small bilateral pleural effusions, improved from prior.  VESSELS: No evidence of pulmonary embolism. Pulmonary emboli seen in 2019 have resolved. Aberrant right subclavian artery reidentified.  HEART: Moderate cardiomegaly. No pericardial effusion.  MEDIASTINUM AND VIRGILIO: No lymphadenopathy. Abnormally distended fluid-filled esophagus.  CHEST WALL AND LOWER NECK: Within normal limits.    ABDOMEN AND PELVIS:  LIVER: Within normal limits.  BILE DUCTS: Normal caliber.  GALLBLADDER: Cholelithiasis.  SPLEEN: Within normal limits.  PANCREAS: Within normal limits.  ADRENALS: Within normal limits.  KIDNEYS/URETERS: 7 mm right midpole renal cyst.    BLADDER: Collapsed around a Cain catheter.  REPRODUCTIVE ORGANS: Unremarkable uterus.    BOWEL: Small bowel anastomosis in the left abdomen. Markedly distended fecalized small bowel with loops measuring up to 7.6 cm in caliber. The colon is completely collapsed. Appendix is not visualized and cannot be assessed.  PERITONEUM: Moderate ascites unchanged from 10/21/2020. No pneumoperitoneum.  VESSELS: Swirling of mesenteric vessels (17:64-83). IVC filter in situ. Patent celiac axis, SMA and KINA. No portal venous gas.  RETROPERITONEUM/LYMPH NODES: No lymphadenopathy.  ABDOMINAL WALL: Within normal limits.  BONES: T5 compression fracture unchanged.    IMPRESSION:  No evidence of pulmonary embolism.  High-grade distal small bowel obstruction. Swirling of mesenteric vessels suggestive of volvulus/internal hernia/closed obstruction. Resulting abnormally distended fluid-filled esophagus. Ascites.  Bilateral lower lobe consolidation secondary to atelectasis and/or pneumonia. Small bilateral pleural effusions.  Chronic findings as above.      CRITICAL VALUE: I discussed the major findings in this report with Dr. Reyes in the ICU at 2021 10:33 AM with readback. Critical value policy of the hospital was followed. Read back and confirmation of receipt of this communication was  performed. This verbal communication supplements the text report of this document.    < end of copied text >   GENERAL SURGERY CONSULT NOTE  65y old  Female who presents with a chief complaint of distended abdomen and hypotension (2021 10:55)    HPI:   65y Female from [facility], AAO x3,  with PMH of recurrent SBO's, s/p exp lap, SB resection in , ex lap, ALBINA in 2018, DVT, PE, on Xarelto, IVC filter, decubitus ulcer with OM of sacrum in the past, chronic leg swelling, and anasarca in the past, came in to the ED presents from NH with shortness of breath, hypotension and vomiting since yesterday with associated increased abdominal distention and abdominal pain since yesterday. Last BM yesterday, denies passing flatus since yesterday. Reports abdominal discomfort. This AM NH staff noted low blood pressure thus sent her to ED for evaluation. Denies fevers, cough, chest pain, diarrhea or urinary symptoms. Pt had a prolonged hospitalization, discharged in Dec 2020, admitted for septic shock with Bacteroides fragilis Bacteremia most likely GI source, requiring intubation and pressors, sacral ulcer with OM of sacrum, HCAP, pt treated with Linezolid and fluconazole. Pt was hospitalized from Oct to Dec 2020, discharged to [facility].     Pt was seen and examined at bedside, reports nausea and vomiting yesterday since 3pm. Pt has been in the hospital multiple times in the past for the same problem. 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 Pt reports her abdominal pain has improved and states she does not want surgery at this time. Pt asking us to call and speak to Dr. Ross.    PAST MEDICAL & SURGICAL HISTORY:  Anasarca  Pulmonary embolism  DVT (deep venous thrombosis)  SBO (small bowel obstruction)  ex-lap  H/O exploratory laparotomy    Review of Systems:  please see HPI    MEDICATIONS  (STANDING):  sodium chloride 0.9%. 1000 milliLiter(s) (80 mL/Hr) IV Continuous <Continuous>    Allergies: No Known Allergies    FAMILY HISTORY:  No pertinent family history in first degree relatives      Vital Signs Last 24 Hrs  T(C): 36.9 (2021 11:58), Max: 36.9 (2021 11:58)  T(F): 98.5 (2021 11:58), Max: 98.5 (2021 11:58)  HR: 99 (2021 11:58) (99 - 135)  BP: 92/59 (2021 11:58) (69/50 - 101/62)  BP(mean): --  RR: 18 (2021 11:58) (16 - 18)  SpO2: 99% (2021 11:58) (98% - 100%)    Physical Exam:    General:  Appears stated age, NAD  HEENT: NGT in place to wall suction with 400cc brown output  Chest:  non-labored breathing  CV: S1S2  Abdomen: firm, distended, tender on palpation; midline well-healed incision noted  Rectal: stool noted in rectal vault, no stricture appreciated  Skin:  No rash  Neuro/Psych:  Alert, oriented tp time, place and person       LABS:                        11.0   12.86 )-----------( 250      ( 2021 06:42 )             36.2         139  |  105  |  26<H>  ----------------------------<  148<H>  5.3   |  18<L>  |  1.16    Ca    8.6      2021 06:42    TPro  7.5  /  Alb  2.8<L>  /  TBili  0.9  /  DBili  x   /  AST  54<H>  /  ALT  56  /  AlkPhos  182<H>    PT/INR - ( 2021 07:28 )   PT: 33.1 sec;   INR: 2.93 ratio    PTT - ( 2021 07:28 )  PTT:84.1 sec    Urinalysis Basic - ( 2021 08:44 )  Color: Sue / Appearance: Clear / S.025 / pH: x  Gluc: x / Ketone: Trace  / Bili: Moderate / Urobili: 4   Blood: x / Protein: 100 / Nitrite: Positive   Leuk Esterase: Small / RBC: 2-5 /HPF / WBC 6-10 /HPF   Sq Epi: x / Non Sq Epi: Negative /HPF / Bacteria: Moderate /HPF    RADIOLOGY & ADDITIONAL STUDIES:  < from: CT Angio Chest w/ IV Cont (21 @ 10:03) >  FINDINGS:  CHEST:  LUNGS AND LARGE AIRWAYS: Patent central airways. No pulmonary nodules. Bilateral lower lobe consolidation,greater on the left, secondary to atelectasis and/or infiltrate.  PLEURA: Small bilateral pleural effusions, improved from prior.  VESSELS: No evidence of pulmonary embolism. Pulmonary emboli seen in 2019 have resolved. Aberrant right subclavian artery reidentified.  HEART: Moderate cardiomegaly. No pericardial effusion.  MEDIASTINUM AND VIRGILIO: No lymphadenopathy. Abnormally distended fluid-filled esophagus.  CHEST WALL AND LOWER NECK: Within normal limits.    ABDOMEN AND PELVIS:  LIVER: Within normal limits.  BILE DUCTS: Normal caliber.  GALLBLADDER: Cholelithiasis.  SPLEEN: Within normal limits.  PANCREAS: Within normal limits.  ADRENALS: Within normal limits.  KIDNEYS/URETERS: 7 mm right midpole renal cyst.    BLADDER: Collapsed around a Cain catheter.  REPRODUCTIVE ORGANS: Unremarkable uterus.    BOWEL: Small bowel anastomosis in the left abdomen. Markedly distended fecalized small bowel with loops measuring up to 7.6 cm in caliber. The colon is completely collapsed. Appendix is not visualized and cannot be assessed.  PERITONEUM: Moderate ascites unchanged from 10/21/2020. No pneumoperitoneum.  VESSELS: Swirling of mesenteric vessels (17:64-83). IVC filter in situ. Patent celiac axis, SMA and KINA. No portal venous gas.  RETROPERITONEUM/LYMPH NODES: No lymphadenopathy.  ABDOMINAL WALL: Within normal limits.  BONES: T5 compression fracture unchanged.    IMPRESSION:  No evidence of pulmonary embolism.  High-grade distal small bowel obstruction. Swirling of mesenteric vessels suggestive of volvulus/internal hernia/closed obstruction. Resulting abnormally distended fluid-filled esophagus. Ascites.  Bilateral lower lobe consolidation secondary to atelectasis and/or pneumonia. Small bilateral pleural effusions.  Chronic findings as above.      CRITICAL VALUE: I discussed the major findings in this report with Dr. Reyes in the ICU at 2021 10:33 AM with readback. Critical value policy of the hospital was followed. Read back and confirmation of receipt of this communication was  performed. This verbal communication supplements the text report of this document.    < end of copied text >   GENERAL SURGERY CONSULT NOTE  65y old  Female who presents with a chief complaint of distended abdomen and hypotension (2021 10:55)    HPI:   65y Female from [facility], AAO x3,  with PMH of recurrent SBO's, s/p exp lap, SB resection in , ex lap, ALBINA in 2018, DVT, PE, on Xarelto, IVC filter, decubitus ulcer with OM of sacrum in the past, chronic leg swelling, and anasarca in the past, came in to the ED presents from NH with shortness of breath, hypotension and vomiting since yesterday with associated increased abdominal distention and abdominal pain since yesterday. Last BM yesterday, denies passing flatus since yesterday. Reports abdominal discomfort. This AM NH staff noted low blood pressure thus sent her to ED for evaluation. Denies fevers, cough, chest pain, diarrhea or urinary symptoms. Pt had a prolonged hospitalization, discharged in Dec 2020, admitted for septic shock with Bacteroides fragilis Bacteremia most likely GI source, requiring intubation and pressors, sacral ulcer with OM of sacrum, HCAP, pt treated with Linezolid and fluconazole. Pt was hospitalized from Oct to Dec 2020, discharged to [facility].     Pt was seen and examined at bedside, reports nausea and vomiting yesterday since 3pm. Pt has been in the hospital multiple times in the past for the same problem. 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 Pt reports her abdominal pain has improved and states she does not want surgery at this time. Pt asking us to call and speak to Dr. Ross.    PAST MEDICAL & SURGICAL HISTORY:  Anasarca  Pulmonary embolism  DVT (deep venous thrombosis)  SBO (small bowel obstruction)  ex-lap  H/O exploratory laparotomy    Review of Systems:  please see HPI    MEDICATIONS  (STANDING):  sodium chloride 0.9%. 1000 milliLiter(s) (80 mL/Hr) IV Continuous <Continuous>    Allergies: No Known Allergies    FAMILY HISTORY:  No pertinent family history in first degree relatives      Vital Signs Last 24 Hrs  T(C): 36.9 (2021 11:58), Max: 36.9 (2021 11:58)  T(F): 98.5 (2021 11:58), Max: 98.5 (2021 11:58)  HR: 99 (2021 11:58) (99 - 135)  BP: 92/59 (2021 11:58) (69/50 - 101/62)  BP(mean): --  RR: 18 (2021 11:58) (16 - 18)  SpO2: 99% (2021 11:58) (98% - 100%)    Physical Exam:    General:  Appears stated age, NAD  HEENT: NGT in place to wall suction with 400cc brown output  Chest:  non-labored breathing  CV: S1 S2  Abdomen: firm, distended, tender on palpation; midline well-healed incision noted  Rectal: stool noted in rectal vault, no stricture appreciated  Skin: large stage 3 sacral decubitus ulcer  Neuro/Psych: Alert, oriented to time, place and person       LABS:                        11.0   12.86 )-----------( 250      ( 2021 06:42 )             36.2         139  |  105  |  26<H>  ----------------------------<  148<H>  5.3   |  18<L>  |  1.16    Ca    8.6      2021 06:42    TPro  7.5  /  Alb  2.8<L>  /  TBili  0.9  /  DBili  x   /  AST  54<H>  /  ALT  56  /  AlkPhos  182<H>    PT/INR - ( 2021 07:28 )   PT: 33.1 sec;   INR: 2.93 ratio    PTT - ( 2021 07:28 )  PTT:84.1 sec    Urinalysis Basic - ( 2021 08:44 )  Color: Sue / Appearance: Clear / S.025 / pH: x  Gluc: x / Ketone: Trace  / Bili: Moderate / Urobili: 4   Blood: x / Protein: 100 / Nitrite: Positive   Leuk Esterase: Small / RBC: 2-5 /HPF / WBC 6-10 /HPF   Sq Epi: x / Non Sq Epi: Negative /HPF / Bacteria: Moderate /HPF    RADIOLOGY & ADDITIONAL STUDIES:  < from: CT Angio Chest w/ IV Cont (21 @ 10:03) >  FINDINGS:  CHEST:  LUNGS AND LARGE AIRWAYS: Patent central airways. No pulmonary nodules. Bilateral lower lobe consolidation,greater on the left, secondary to atelectasis and/or infiltrate.  PLEURA: Small bilateral pleural effusions, improved from prior.  VESSELS: No evidence of pulmonary embolism. Pulmonary emboli seen in 2019 have resolved. Aberrant right subclavian artery reidentified.  HEART: Moderate cardiomegaly. No pericardial effusion.  MEDIASTINUM AND VIRGILIO: No lymphadenopathy. Abnormally distended fluid-filled esophagus.  CHEST WALL AND LOWER NECK: Within normal limits.    ABDOMEN AND PELVIS:  LIVER: Within normal limits.  BILE DUCTS: Normal caliber.  GALLBLADDER: Cholelithiasis.  SPLEEN: Within normal limits.  PANCREAS: Within normal limits.  ADRENALS: Within normal limits.  KIDNEYS/URETERS: 7 mm right midpole renal cyst.    BLADDER: Collapsed around a Cain catheter.  REPRODUCTIVE ORGANS: Unremarkable uterus.    BOWEL: Small bowel anastomosis in the left abdomen. Markedly distended fecalized small bowel with loops measuring up to 7.6 cm in caliber. The colon is completely collapsed. Appendix is not visualized and cannot be assessed.  PERITONEUM: Moderate ascites unchanged from 10/21/2020. No pneumoperitoneum.  VESSELS: Swirling of mesenteric vessels (17:64-83). IVC filter in situ. Patent celiac axis, SMA and KINA. No portal venous gas.  RETROPERITONEUM/LYMPH NODES: No lymphadenopathy.  ABDOMINAL WALL: Within normal limits.  BONES: T5 compression fracture unchanged.    IMPRESSION:  No evidence of pulmonary embolism.  High-grade distal small bowel obstruction. Swirling of mesenteric vessels suggestive of volvulus/internal hernia/closed obstruction. Resulting abnormally distended fluid-filled esophagus. Ascites.  Bilateral lower lobe consolidation secondary to atelectasis and/or pneumonia. Small bilateral pleural effusions.  Chronic findings as above.      CRITICAL VALUE: I discussed the major findings in this report with Dr. Reyes in the ICU at 2021 10:33 AM with readback. Critical value policy of the hospital was followed. Read back and confirmation of receipt of this communication was  performed. This verbal communication supplements the text report of this document.    < end of copied text >

## 2021-02-23 NOTE — H&P ADULT - NSICDXFAMILYHX_GEN_ALL_CORE_FT
FAMILY HISTORY:  No pertinent family history in first degree relatives    

## 2021-02-23 NOTE — CHART NOTE - NSCHARTNOTEFT_GEN_A_CORE
Spoke to Jus / daughter  Had a long d/w the pt   Discussed about her status and the cat scan findings  SBO with no collapsed bowel  Had multiple abdominal surgeries and multiple admissions for sbo  Presently has distended abdomen and given the cat scan findings, recommended explorative laparotomy and possible ostomy  Pt does not want any surgery today  Discussed also with the daughter and wants to observe for today  All the options, benefits and risks were discussed  By waiting the potential for necrotic bowel was also discussed  Daughter also said pt is capable of  understanding the situation 01-Sep-2017 06:40

## 2021-02-23 NOTE — ED ADULT NURSE NOTE - ED STAT RN HANDOFF DETAILS
Endorsed from [facility] pt hypotensive abd distended 9x9 sacral decube stage 4 mepilex dsg applied.

## 2021-02-23 NOTE — CONSULT NOTE ADULT - SUBJECTIVE AND OBJECTIVE BOX
DAPHNIE PADILLAICE  65y  Female      Patient is a 65y old  Female who presents with a chief complaint of distended abdomen and hypotension (2021 08:29)      INTERVAL HPI/OVERNIGHT EVENTS:  65y Female from [facility], AAO x3,  with PMH of recurrent SBO's, s/p exp lap, SB resection in , ex lap, ALBINA in 2018, DVT, PE, on Xarelto, IVC filter, decubitus ulcer with OM of sacrum in the past, chronic leg swelling, and anasarca in the past, came in to the ED presents from NH with shortness of breath, hypotension and vomiting since yesterday with associated increased abdominal distention and abdominal pain since yesterday. Last BM yesterday, denies passing flatus since yesterday. Reports abdominal discomfort. This AM NH staff noted low blood pressure thus sent her to ED for evaluation. Denies fevers, cough, chest pain, diarrhea or urinary symptoms.    off note, pt had prolonged hospitalization, discharged in Dec 2020, admitted for septic shock with Bacteroides fragilis Bacteremia most likely GI source, requiring intubation and pressors, sacral ulcer with OM of sacrum, HCAP, pt treated with Linezolid and fluconazole. Pt was hospitalized from Oct to Dec 2020, discharged to [facility].     REVIEW OF SYSTEMS:  CONSTITUTIONAL: No fever,   EYES: no acute visual disturbances  NECK: No pain or stiffness  RESPIRATORY: No cough; No shortness of breath  CARDIOVASCULAR: No chest pain, no palpitations  GASTROINTESTINAL: No pain. No nausea or vomiting; No diarrhea   NEUROLOGICAL: No headache or numbness, no tremors  MUSCULOSKELETAL: No joint pain, no muscle pain  GENITOURINARY: no dysuria, no frequency, no hesitancy  PSYCHIATRY: no depression , no anxiety  ALL OTHER  ROS negative      PAST MEDICAL HISTORY:  Anasarca     DVT (deep venous thrombosis)     Pulmonary embolism     SBO (small bowel obstruction) ex-lap.     PAST SURGICAL HISTORY:  H/O exploratory laparotomy.     FAMILY HISTORY:  No pertinent family history in first degree relatives.     No Pertinent Family History in first degree relatives of: -.      T(C): 36.6 (21 @ 10:20), Max: 36.7 (21 @ 05:54)  HR: 100 (21 @ 10:20) (99 - 135)  BP: 101/62 (21 @ 10:20) (/50 - /62)  RR: 16 (21 @ 10:20) (16 - 18)  SpO2: 100% (21 @ 10:20) (98% - 100%)  Wt(kg): --Vital Signs Last 24 Hrs  T(C): 36.6 (2021 10:20), Max: 36.7 (2021 05:54)  T(F): 97.9 (2021 10:20), Max: 98 (2021 05:54)  HR: 100 (2021 10:20) (99 - 135)  BP: 101/62 (2021 10:20) (69/50 - /62)  BP(mean): --  RR: 16 (2021 10:20) (16 - 18)  SpO2: 100% (2021 10:20) (98% - 100%)    PHYSICAL EXAM:  GENERAL: female in bed, in no acute distress   HEENT: Normocephalic;  conjunctivae and sclerae clear; moist mucous membranes;   NECK : supple  CHEST/LUNG: Clear to auscultation bilaterally with good air entry   HEART: S1 S2  regular; tachycardia   ABDOMEN: distended, tense, non tender, no BS   EXTREMITIES: no cyanosis; no edema; no calf tenderness  SKIN: sacral ulcer present   NERVOUS SYSTEM:  Awake and alert; Oriented  to place, person and time ; no new deficits    Consultant(s) Notes Reviewed:  [x ] YES  [ ] NO  Care Discussed with Consultants/Other Providers [ x] YES  [ ] NO    LABS:                        11.0   12.86 )-----------( 250      ( 2021 06:42 )             36.2         139  |  105  |  26<H>  ----------------------------<  148<H>  5.3   |  18<L>  |  1.16    Ca    8.6      2021 06:42    TPro  7.5  /  Alb  2.8<L>  /  TBili  0.9  /  DBili  x   /  AST  54<H>  /  ALT  56  /  AlkPhos  182<H>      PT/INR - ( 2021 07:28 )   PT: 33.1 sec;   INR: 2.93 ratio         PTT - ( 2021 07:28 )  PTT:84.1 sec  Urinalysis Basic - ( 2021 08:44 )    Color: Sue / Appearance: Clear / S.025 / pH: x  Gluc: x / Ketone: Trace  / Bili: Moderate / Urobili: 4   Blood: x / Protein: 100 / Nitrite: Positive   Leuk Esterase: Small / RBC: 2-5 /HPF / WBC 6-10 /HPF   Sq Epi: x / Non Sq Epi: Negative /HPF / Bacteria: Moderate /HPF      CAPILLARY BLOOD GLUCOSE      POCT Blood Glucose.: 147 mg/dL (2021 06:09)        Urinalysis Basic - ( 2021 08:44 )    Color: Sue / Appearance: Clear / S.025 / pH: x  Gluc: x / Ketone: Trace  / Bili: Moderate / Urobili: 4   Blood: x / Protein: 100 / Nitrite: Positive   Leuk Esterase: Small / RBC: 2-5 /HPF / WBC 6-10 /HPF   Sq Epi: x / Non Sq Epi: Negative /HPF / Bacteria: Moderate /HPF        RADIOLOGY & ADDITIONAL TESTS:    Imaging Personally Reviewed:  [ ] YES  [ ] NO Patient is a 65y old  Female who presents with a chief complaint of distended abdomen and hypotension (2021 08:29)      INTERVAL HPI/OVERNIGHT EVENTS:  65y Female from [facility], AAO x3,  with PMH of recurrent SBO's, s/p exp lap, SB resection in , ex lap, ALBINA in 2018, DVT, PE, on Xarelto, IVC filter, decubitus ulcer with OM of sacrum in the past, chronic leg swelling, and anasarca in the past, came in to the ED presents from NH with shortness of breath, hypotension and vomiting since yesterday with associated increased abdominal distention and abdominal pain since yesterday. Last BM yesterday, denies passing flatus since yesterday. Reports abdominal discomfort. This AM NH staff noted low blood pressure thus sent her to ED for evaluation. Denies fevers, cough, chest pain, diarrhea or urinary symptoms.    off note, pt had prolonged hospitalization, discharged in Dec 2020, admitted for septic shock with Bacteroides fragilis Bacteremia most likely GI source, requiring intubation and pressors, sacral ulcer with OM of sacrum, HCAP, pt treated with Linezolid and fluconazole. Pt was hospitalized from Oct to Dec 2020, discharged to [facility].     REVIEW OF SYSTEMS:  CONSTITUTIONAL: No fever,   EYES: no acute visual disturbances  NECK: No pain or stiffness  RESPIRATORY: No cough; No shortness of breath  CARDIOVASCULAR: No chest pain, no palpitations  GASTROINTESTINAL: as above  NEUROLOGICAL: No headache or numbness, no tremors  MUSCULOSKELETAL: No joint pain, no muscle pain  sacral ulcer   GENITOURINARY: no dysuria, no frequency, no hesitancy  PSYCHIATRY: no depression , no anxiety  ALL OTHER  ROS negative      PAST MEDICAL HISTORY:  Anasarca     DVT (deep venous thrombosis)     Pulmonary embolism     SBO (small bowel obstruction) ex-lap.     PAST SURGICAL HISTORY:  H/O exploratory laparotomy.     FAMILY HISTORY:  No pertinent family history in first degree relatives.           T(C): 36.6 (21 @ 10:20), Max: 36.7 (21 @ 05:54)  HR: 100 (21 @ 10:20) (99 - 135)  BP: 101/62 (21 @ 10:20) (69/50 - 101/62)  RR: 16 (21 @ 10:20) (16 - 18)  SpO2: 100% (- @ 10:20) (98% - 100%)  Wt(kg): --Vital Signs Last 24 Hrs  T(C): 36.6 (2021 10:20), Max: 36.7 (2021 05:54)  T(F): 97.9 (2021 10:20), Max: 98 (2021 05:54)  HR: 100 (2021 10:20) (99 - 135)  BP: 101/62 (2021 10:20) (69/50 - 101/62)  BP(mean): --  RR: 16 (2021 10:20) (16 - 18)  SpO2: 100% (2021 10:20) (98% - 100%)    PHYSICAL EXAM:  GENERAL: female in bed, in no acute distress   HEENT: Normocephalic;  conjunctivae and sclerae clear; moist mucous membranes;   NECK : supple  CHEST/LUNG: Clear to auscultation bilaterally with good air entry   HEART: S1 S2  regular; tachycardia   ABDOMEN: distended, tense, non tender, no BS   EXTREMITIES: no cyanosis; no edema; no calf tenderness  SKIN: sacral ulcer present   NERVOUS SYSTEM:  Awake and alert; Oriented  to place, person and time ; no new deficits    Consultant(s) Notes Reviewed:  [x ] YES  [ ] NO  Care Discussed with Consultants/Other Providers [ x] YES  [ ] NO    LABS:                        11.0   12.86 )-----------( 250      ( 2021 06:42 )             36.2         139  |  105  |  26<H>  ----------------------------<  148<H>  5.3   |  18<L>  |  1.16    Ca    8.6      2021 06:42    TPro  7.5  /  Alb  2.8<L>  /  TBili  0.9  /  DBili  x   /  AST  54<H>  /  ALT  56  /  AlkPhos  182<H>      PT/INR - ( 2021 07:28 )   PT: 33.1 sec;   INR: 2.93 ratio         PTT - ( 2021 07:28 )  PTT:84.1 sec  Urinalysis Basic - ( 2021 08:44 )    Color: Sue / Appearance: Clear / S.025 / pH: x  Gluc: x / Ketone: Trace  / Bili: Moderate / Urobili: 4   Blood: x / Protein: 100 / Nitrite: Positive   Leuk Esterase: Small / RBC: 2-5 /HPF / WBC 6-10 /HPF   Sq Epi: x / Non Sq Epi: Negative /HPF / Bacteria: Moderate /HPF      CAPILLARY BLOOD GLUCOSE      POCT Blood Glucose.: 147 mg/dL (2021 06:09)        Urinalysis Basic - ( 2021 08:44 )    Color: Sue / Appearance: Clear / S.025 / pH: x  Gluc: x / Ketone: Trace  / Bili: Moderate / Urobili: 4   Blood: x / Protein: 100 / Nitrite: Positive   Leuk Esterase: Small / RBC: 2-5 /HPF / WBC 6-10 /HPF   Sq Epi: x / Non Sq Epi: Negative /HPF / Bacteria: Moderate /HPF        RADIOLOGY & ADDITIONAL TESTS:  < from: CT Angio Chest w/ IV Cont (21 @ 10:03) >    EXAM:  CT ABDOMEN AND PELVIS IC                          EXAM:  CT ANGIO CHEST (W)AW IC                            PROCEDURE DATE:  2021          INTERPRETATION:  CLINICAL INDICATION: 65 years  Female with r/o PE.    COMPARISON: CT chest 2019 and CT abdomen and pelvis 10/21/2020 and 2019    PROCEDURE:  CT Angiography of the Chest was performed followed by portal venous phase imaging of the Abdomen and Pelvis.  IV Contrast: 90 ml of Omnipaque 350 was injected intravenously. 10 mlwere discarded.  Oral contrast: None.  Sagittal and coronal reformats were performed as well as 3D (MIP) reconstructions.    FINDINGS:  CHEST:  LUNGS AND LARGE AIRWAYS: Patent central airways. No pulmonary nodules. Bilateral lower lobe consolidation,greater on the left, secondary to atelectasis and/or infiltrate.  PLEURA: Small bilateral pleural effusions, improved from prior.  VESSELS: No evidence of pulmonary embolism. Pulmonary emboli seen in 2019 have resolved. Aberrant right subclavian artery reidentified.  HEART: Moderate cardiomegaly. No pericardial effusion.  MEDIASTINUM AND VIRGILIO: No lymphadenopathy. Abnormally distended fluid-filled esophagus.  CHEST WALL AND LOWER NECK: Within normal limits.    ABDOMEN AND PELVIS:  LIVER: Within normal limits.  BILE DUCTS: Normal caliber.  GALLBLADDER: Cholelithiasis.  SPLEEN: Within normal limits.  PANCREAS: Within normal limits.  ADRENALS: Within normal limits.  KIDNEYS/URETERS: 7 mm right midpole renal cyst.    BLADDER: Collapsed around a Cain catheter.  REPRODUCTIVE ORGANS: Unremarkable uterus.    BOWEL: Small bowel anastomosis in the left abdomen. Markedly distended fecalized small bowel with loops measuring up to 7.6 cm in caliber. The colon is completely collapsed. Appendix is not visualized and cannot be assessed.  PERITONEUM: Moderate ascites unchanged from 10/21/2020. No pneumoperitoneum.  VESSELS: Swirling of mesenteric vessels (17:64-83). IVC filter in situ. Patent celiac axis, SMA and KINA. No portal venous gas.  RETROPERITONEUM/LYMPH NODES: No lymphadenopathy.  ABDOMINAL WALL: Within normal limits.  BONES: T5 compression fracture unchanged.    IMPRESSION:  No evidence of pulmonary embolism.    High-grade distal small bowel obstruction. Swirling of mesenteric vessels suggestive of volvulus/internal hernia/closed obstruction. Resulting abnormally distended fluid-filled esophagus. Ascites.    Bilateral lower lobe consolidation secondary to atelectasis and/or pneumonia. Small bilateral pleural effusions.    Chronic findings as above.      CRITICAL VALUE: I discussed the major findings in this report with Dr. Reyes in the ICU at 2021 10:33 AM with readback. Critical value policy of the hospital was followed. Read back and confirmation of receipt of this communication was  performed. This verbal communication supplements the text report of this document.            SHAHBAZ PEÑALOZA MD; Attending Radiologist  This document has been electronically signed. 2021 11:06AM        Imaging Personally Reviewed:  [ ] YES  [ x] NO

## 2021-02-23 NOTE — ED PROVIDER NOTE - CARE PLAN
Principal Discharge DX:	Severe sepsis  Secondary Diagnosis:	Lactate blood increased   Principal Discharge DX:	SBO (small bowel obstruction)  Secondary Diagnosis:	Lactate blood increased

## 2021-02-23 NOTE — CONSULT NOTE ADULT - ATTENDING COMMENTS
pt seen with distended abdomen. Has tenderness on deep palpation  Cat scan reviewed with the radiologist. No collapsed loop of bowel  Discussed about surgery and with a potential of ileostomy  Presently she does not want any surgery and wants us to talk to Dr. Ross  I wanted to talk with the family and she said not presently needed

## 2021-02-23 NOTE — CONSULT NOTE ADULT - ASSESSMENT
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 Ross, spoke to Dr. Ross will accept pt under his service  - Recommend keep NGT  - Recommend NPO, IVF  - GI evaluation  - Recommend IV abx  - Paged medical admitting resident: pending response  - Discussed with Dr. Stewart, Dr. Ross and Dr. 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 Ross, spoke to Dr. Ross will accept pt under his service  - Recommend keep NGT  - Recommend NPO, IVF  - GI evaluation  - Recommend IV abx  - Paged medical admitting resident  - Discussed with Dr. Stewart, Dr. Ross and Dr. Ferrara

## 2021-02-23 NOTE — H&P ADULT - NSICDXNOFAMILYHX_GEN_ALL_CORE
<-- Click to add NO pertinent Family History

## 2021-02-23 NOTE — ED PROVIDER NOTE - OBJECTIVE STATEMENT
64yo F with hx recurrent SBO's, s/p exp lap, SB resection in 2015, ex lap, ALBINA in 2018, DVT, PE, on Xarelto, IVC filter, chronic leg swelling, and anasarca presents from NH with shortness of breath, hypotension and vomiting. Reports acute onset of symptoms last night, reports increased abdominal distention and abdominal pain. This AM NH staff noted low blood pressure thus sent her to ED for evaluation. Denies fevers, cough, chest pain, diarrhea or urinary symptoms.

## 2021-02-23 NOTE — PATIENT PROFILE ADULT - ANY SIGNIFICANT CHANGE IN ABILITY TO PERFORM THE FOLLOWING ADL SINCE THE ONSET OF PRESENT ILLNESS?
Pt came to [facility] with additional CPS Disability forms for Dr Edgar Pérez to complete.   Scanned to Forms and took orig to ADRIANO no

## 2021-02-23 NOTE — H&P ADULT - NSHPPHYSICALEXAM_GEN_ALL_CORE
General: alert, awake, NAD  Repiratory: on supplemental O2  Abdomen: firm, distended, tender on palpation; midline well-healed abdominal scar noted  : velazquez in place draining well  Extremities: warm and well perfused General: alert, awake, NAD  Repiratory: on supplemental O2  Abdomen: firm, distended, tender on palpation diffusely; midline well-healed abdominal scar noted  Rectal: stool in rectal vault, no stricture appreciated  : velazquez in place draining well  Extremities: warm and well perfused

## 2021-02-23 NOTE — H&P ADULT - NSHPREVIEWOFSYSTEMS_GEN_ALL_CORE
REVIEW OF SYSTEMS:  CONSTITUTIONAL: No fever,   EYES: no acute visual disturbances  NECK: No pain or stiffness  RESPIRATORY: No cough; No shortness of breath  CARDIOVASCULAR: No chest pain, no palpitations  GASTROINTESTINAL: as above   NEUROLOGICAL: No headache or numbness, no tremors  MUSCULOSKELETAL: No joint pain, no muscle pain  GENITOURINARY: no dysuria, no frequency, no hesitancy  PSYCHIATRY: no depression , no anxiety  ALL OTHER  ROS negative
REVIEW OF SYSTEMS:  CONSTITUTIONAL: No fever,   EYES: no acute visual disturbances  NECK: No pain or stiffness  RESPIRATORY: No cough; No shortness of breath  CARDIOVASCULAR: No chest pain, no palpitations  GASTROINTESTINAL: as above  NEUROLOGICAL: No headache or numbness, no tremors  MUSCULOSKELETAL: No joint pain, no muscle pain  sacral ulcer   GENITOURINARY: no dysuria, no frequency, no hesitancy  PSYCHIATRY: no depression , no anxiety  ALL OTHER  ROS negative

## 2021-02-23 NOTE — H&P ADULT - NSICDXPASTMEDICALHX_GEN_ALL_CORE_FT
PAST MEDICAL HISTORY:  Anasarca     DVT (deep venous thrombosis)     Pulmonary embolism     SBO (small bowel obstruction) ex-lap    

## 2021-02-23 NOTE — ED PROVIDER NOTE - PROGRESS NOTE DETAILS
BP 85 systolic, pt awake alert, firm distended abdomen. patient states baseline BP in the 90s. ICU consult evaluating patient in Emergency Department. CT reveals SBO. NGT placed by surgical team. Patient awake, states she is feeling better. declining surgery. Dr Stewart spoke with daughter to discuss poor prognosis without surgical intervention. admit to ICU. lactic acid increased despite adequate fluid boluses likely due to bowel hypoperfusion/ ischemia.

## 2021-02-23 NOTE — H&P ADULT - NSICDXPASTSURGICALHX_GEN_ALL_CORE_FT
PAST SURGICAL HISTORY:  H/O exploratory laparotomy     

## 2021-02-23 NOTE — H&P ADULT - HISTORY OF PRESENT ILLNESS
65y Female from [facility], AAO x3,  with PMH of recurrent SBO's, s/p exp lap, SB resection in 2015, ex lap, ALBINA in 2018, DVT, PE, on Xarelto, IVC filter, decubitus ulcer with OM of sacrum in the past, chronic leg swelling, and anasarca in the past, came in to the ED presents from NH with shortness of breath, hypotension and vomiting since yesterday with associated increased abdominal distention and abdominal pain since yesterday. Last BM yesterday, denies passing flatus since yesterday. Reports abdominal discomfort. This AM NH staff noted low blood pressure thus sent her to ED for evaluation. Denies fevers, cough, chest pain, diarrhea or urinary symptoms.    off note, pt had prolonged hospitalization, discharged in Dec 2020, admitted for septic shock with Bacteroides fragilis Bacteremia most likely GI source, requiring intubation and pressors, sacral ulcer with OM of sacrum, HCAP, pt treated with Linezolid and fluconazole. Pt was hospitalized from Oct to Dec 2020, discharged to [facility]. 
65y Female from [facility], AAO x3,  with PMH of recurrent SBO's, s/p exp lap, SB resection in 2015, ex lap, ALBINA in 2018, DVT, PE, on Xarelto, IVC filter, decubitus ulcer with OM of sacrum in the past, chronic leg swelling, and anasarca in the past, came in to the ED presents from NH with shortness of breath, hypotension and vomiting since yesterday with associated increased abdominal distention and abdominal pain since yesterday. Last BM yesterday, denies passing flatus since yesterday. Reports abdominal discomfort. This AM NH staff noted low blood pressure thus sent her to ED for evaluation. Denies fevers, cough, chest pain, diarrhea or urinary symptoms.    off note, pt had prolonged hospitalization, discharged in Dec 2020, admitted for septic shock with Bacteroides fragilis Bacteremia most likely GI source, requiring intubation and pressors, sacral ulcer with OM of sacrum, HCAP, pt treated with Linezolid and fluconazole. Pt was hospitalized from Oct to Dec 2020, discharged to [facility]. 
65F from [facility], AAO x3, with PMHx of recurrent SBO's, s/p exp lap, SB resection in 2015, ex lap, ALBINA in 2018, DVT, PE, on Xarelto, IVC filter, decubitus ulcer with OM of sacrum in the past, chronic leg swelling, and anasarca in the past, came in to the ED presents from NH with shortness of breath, hypotension and vomiting since yesterday with associated increased abdominal distention and abdominal pain since yesterday. Last BM yesterday, denies passing flatus since yesterday. This AM NH staff noted low blood pressure thus sent her to ED for evaluation. Denies fevers, cough, chest pain, diarrhea or urinary symptoms. NGT placed at bedside connected to suction with 300cc of brown output.

## 2021-02-23 NOTE — ED ADULT NURSE NOTE - ED STAT RN HANDOFF DETAILS 4
received pt. in bed at 1910 awake and responsive. on CM with sinus tachycardia.   PMH of recurrent SBO's, s/p exp lap, SB resection in 2015, ex lap, ALBINA in 2018, DVT, PE, on Xarelto, IVC filter, decubitus ulcer with OM of sacrum in the past, chronic leg swelling.admitted to ICU for SBO.on ngt to low suction.velazquez cath intact and patent. BP 88/57 dr. moreau aware with no new order made. transfer to ICU report given to rodrick montelongo.not in distress

## 2021-02-23 NOTE — ED ADULT NURSE NOTE - SUICIDE SCREENING QUESTION 3
Reason for call: called Denisse to share abnormal test results  Call made by genetic counselor Onel Escobar      Discussion: Man Butt to review positive genetic test results. Offered in person results appointment. Patient declined. She is a 80-year-old female with no personal history of cancer. Her mother had lobular breast cancer and was identified to have a CDH1 pathogenic variant. 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 Reviewed that pathogenic variants in CDH1 cause Hereditary Diffuse Gastric Cancer ([address] [place] [address],[address]). Individuals with CDH1 pathogenic variants are known to be at an increased risk to develop gastric cancer (56%-83% lifetime risk for women) and female carriers are known to be at an increased risk of breast cancer, typically the lobular type (39-52% lifetime risk). There is also emerging evidence for an increased risk of colon cancer. Medical management guidelines for an individual with a CDH1 pathogenic variant were discussed in detail at time of our initial consult. These were reviewed today. Reviewed that CDH1 mutation carriers should consider prophylactic gastrectomy between the age of 25 and 36 due to high gastric cancer risk. For individuals who are unable or unwilling to have a prophylactic gastrectomy, they may consider upper endoscopy every 6-12 months at a center familiar with [address] [place] [address],[address]. Screening in individuals with a known CDH1 mutation is controversial due to the high rate of false-negative screening endoscopy in this population. It is also well established that many individuals undergoing prophylactic gastrectomy are found to have occult gastric cancers, despite having had normal endoscopy screening prior to surgery. Discussed that diffuse gastric cancer is seemingly absent from Denisse's personal or family history.  Reduced penetrance or other factors may explain this however
discussed that this does not eliminate her risk. Â   Â   For women, specific breast cancer screening for CDH1 mutation carriers is recommended due to the increased risk of lobular breast cancer. NCCN recommends annual mammogram with consideration of tomosynthesis and consider MRI with contrast starting at 27years old. Prophylactic mastectomy cannot be uniformly recommended, but it may be a reasonable option for some women with a family history. There is insufficient data to recommend chemoprevention with tamoxifen. Â     Discussed that in [facility], Dr. Marcy Chase at the [facility] is one option of a physician familiar with [address] [place] [address],[address]. Reviewed the recommendation that Denisse schedule an appointment to discuss the option of screening and prophylactic gastrectomy in more detail. CDH1 pathogenic variants are inherited in an autosomal dominant pattern. If an individual has such a cancer predisposing gene mutation, there is a 50% chance that any offspring or sibling of that individual will not inherit the mutation and a 50% chance that he or she will inherit it. Cancer predisposing gene mutations can exist in males and females and can be passed on to both male and female offspring. Reviewed that family planning options include prenatal testing via amniocentesis or chorionic villus sampling ([facility]) as well as pre-implantation genetic diagnosis (PGD) via invitro fertilization (IVF). There are special considerations when performing prenatal testing for an adult onset condition. Prenatal genetic counseling is recommended to further discuss these options.        Plan:   Â· Follow all relevant NCCN guidelines for CDH1 pathogenic variant carriers (copy of NCCN guidelines provided to patient with copy of results)  Â· Breast Screening  Â· Annual mammogram with consideration of tomosynthesis  Â· Consider MRI with contract starting at age 27  Â· Diffuse Gastric Cancer Management  Â· Recommend prophylactic gastrectomy by age 36  Â· If
prophylactic gastrectomy not pursued, screening upper endoscopy every 6-12 months with random biopsies  Â· Denisse to schedule appointment with Dr. Kathleen Baez to discuss the risks/benefits of above screening vs. preventive surgery to manage increased gastric cancer risk  Â· Recommend prenatal genetic counseling to discuss family planning options  Â· Patient encouraged to contact genetics with questions or concerns. Â· Patient encouraged to utilize support resources including [facility] [address] [facility] [address] [facility]
admission
No

## 2021-02-23 NOTE — ED ADULT NURSE NOTE - NSSEPSISCRITERIAMET_ED_A_ED
Abnormal VS & WBC Abnormal VS & WBC/Abnormal Lactate Abnormal VS & WBC/Abnormal Lactate/Abnormal Bands

## 2021-02-23 NOTE — CONSULT NOTE ADULT - ASSESSMENT
ASSESSMENT AND PLAN:  65y Female from [facility], AAO x3,  with PMH of recurrent SBO's, s/p exp lap, SB resection in 2015, ex lap, ALBINA in 2018, DVT, PE, on Xarelto, IVC filter, decubitus ulcer with OM of sacrum in the past, chronic leg swelling, and anasarca in the past, came in to the ED presents from NH with shortness of breath, hypotension and vomiting since yesterday with associated increased abdominal distention and abdominal pain since yesterday. Last BM yesterday, denies passing flatus since yesterday. Reports abdominal discomfort. This AM NH staff noted low blood pressure thus sent her to ED for evaluation. Denies fevers, cough, chest pain, diarrhea or urinary symptoms.      Assessment:   - High grade bowel obstruction   - Hypotension   - lactic acidosis   - Decubitus ulcer   - h/o DVT, PE s/p IVC filters on Xarelto   - elevated INR   - Anemia         =================== Neuro============================  Alert and oriented x 3     ================= Cardiovascular==========================  Hypotension, SBP: 70, on admission, s/p 3L NS   improved to 100s   will recommend 1L Ns bolus more   -c/w IV fluids     ================- Pulm=================================  - h/o DVT, PE s/p IVC filters on Xarelto      ==================ID===================================  decubitus ulcer       ================= Nephro================================   lactic acidosis   =================GI====================================  High grade bowel obstruction   ================ Heme==================================  Anemia   Anemia panel     elevated INR   INR: 2.9, most likely due to Xarelto,      =================Endocrine===============================  No active issues   follow A1c   ================= Skin/Catheters============================   Peripheral IV lines.     - =================Prophylaxis =============================  DVT proph: on hold for plan for OR. scd for now   Protonix for  GI proph    ==================GOC==================================  DNR only. MOLST in charts   ==================Disposition===============================  Pt needs to go to urgent OR for ex-lap, as lactate is getting worse. CT abd; significant for high grade bowel obstruction.   s/p 4 L Ns bolus. c/w IV fluids. SUMP tube to suction. SBP>100, (Pt baseline BP: 90s)     ASSESSMENT AND PLAN:  65y Female from [facility], AAO x3,  with PMH of recurrent SBO's, s/p exp lap, SB resection in 2015, ex lap, ALBINA in 2018, DVT, PE, on Xarelto, IVC filter, decubitus ulcer with OM of sacrum in the past, chronic leg swelling, and anasarca in the past, came in to the ED presents from NH with shortness of breath, hypotension and vomiting since yesterday with associated increased abdominal distention and abdominal pain since yesterday. Last BM yesterday, denies passing flatus since yesterday. Reports abdominal discomfort. This AM NH staff noted low blood pressure thus sent her to ED for evaluation. Denies fevers, cough, chest pain, diarrhea or urinary symptoms.      Assessment:   - High grade bowel obstruction   - Hypotension   - lactic acidosis   - Decubitus ulcer   - h/o DVT, PE s/p IVC filters on Xarelto   - elevated INR   - Anemia         =================== Neuro============================  Alert and oriented x 3     ================= Cardiovascular==========================  Hypotension, SBP: 70, on admission, s/p 3L NS   improved to 100s   will recommend 1L Ns bolus more   -c/w IV fluids     ================- Pulm=================================  - h/o DVT, PE s/p IVC filters on Xarelto  Hold XArelto for possible plan for surgery       ==================ID===================================  decubitus ulcer   Zosyn vanco recommended     ================= Nephro================================   lactic acidosis   Lactate: 8, s/p 3L Ns, repeat Lactate: 9  ordered another bolus   c/w IV F     =================GI====================================  High grade bowel obstruction   SUMP tube to suction   Recommend orgent ex-lap   surgery consult   NPO   Zosyn   ================ Heme==================================  Anemia   Anemia panel     elevated INR   INR: 2.9, most likely due to Xarelto,    will hold Xarelto for now, for possible plan for surgery     =================Endocrine===============================  No active issues   follow A1c   ================= Skin/Catheters============================   Peripheral IV lines.     - =================Prophylaxis =============================  DVT proph: on hold for plan for OR. scd for now       ==================GOC==================================  DNR only. MOLST in charts   ==================Disposition===============================  Pt needs to go to urgent OR for ex-lap, as lactate is getting worse. CT abd; significant for high grade bowel obstruction.   s/p 4 L Ns bolus. c/w IV fluids. SUMP tube to suction. SBP>100, (Pt baseline BP: 90s)

## 2021-02-23 NOTE — ED ADULT TRIAGE NOTE - CHIEF COMPLAINT QUOTE
Patient is sent from nursing home for evaluation of shortness of breath, hypotension, weakness, distended abdomen

## 2021-02-23 NOTE — H&P ADULT - ATTENDING COMMENTS
Assessment:   - High grade bowel obstruction   - Hypotension   - lactic acidosis   - Decubitus ulcer   - h/o DVT, PE s/p IVC filters on Xarelto   - elevated INR   - Anemia   - UTI   -refused surgical intervention     Plan  -admit to icu  -surgical attend documentation noted.. daughter and pat refuse surgery  -ivf   -pressors as needed  -hemodynamic support  -anticoag  -pain control   -wound care

## 2021-02-23 NOTE — H&P ADULT - ASSESSMENT
ASSESSMENT AND PLAN:      =================== Neuro============================  Alert and oriented x         ================= Cardiovascular==========================    ================- Pulm=================================    ==================ID===================================      ================= Nephro================================    =================GI====================================    ================ Heme==================================  No issues.    =================Endocrine===============================    ================= Skin/Catheters============================  No rashes. Peripheral IV lines.     - =================Prophylaxis =============================  heparin for DVT proph  Protonix for  GI proph    ==================GOC==================================    ==================Disposition===============================  Pt accpeted to ICU for further care

## 2021-02-23 NOTE — H&P ADULT - ASSESSMENT
65y Female from [facility], AAO x3,  with PMH of recurrent SBO's, s/p exp lap, SB resection in 2015, ex lap, ALBINA in 2018, DVT, PE, on Xarelto, IVC filter, decubitus ulcer with OM of sacrum in the past, chronic leg swelling, and anasarca in the past, came in to the ED presents from NH with shortness of breath, hypotension and vomiting since yesterday with associated increased abdominal distention and abdominal pain since yesterday. Last BM yesterday, denies passing flatus since yesterday. Reports abdominal discomfort. This AM NH staff noted low blood pressure thus sent her to ED for evaluation. Denies fevers, cough, chest pain, diarrhea or urinary symptoms.      Assessment:   - High grade bowel obstruction   - Hypotension   - lactic acidosis   - Decubitus ulcer   - h/o DVT, PE s/p IVC filters on Xarelto   - elevated INR   - Anemia         =================== Neuro============================  Alert and oriented x 3     ================= Cardiovascular==========================  Hypotension, SBP: 70, on admission, s/p 3L NS   improved to SBP: 100s (baseline BP in 90s)  will recommend 1L Ns bolus more   -c/w IV fluids     ================- Pulm=================================  - h/o DVT, PE s/p IVC filters on Xarelto  will start on full dose Lovenox for now       ==================ID===================================  decubitus ulcer   Zosyn vanco recommended   - Dr Patricio consulted for ID     impending perforation:   Pt noted to have high grade Obstruction, refused surgery.   c/w Zosyn for intraabdominal pathology   follow Blood culture     ================= Nephro================================   lactic acidosis   Lactate: 8, s/p 3L Ns, repeat Lactate: 9, another 1L Ns bolus ordered.   follow repeat Lactate   c/w IV F       =================GI====================================  High grade bowel obstruction   Pt refused surgery   SUMP tube to suction   NPO   Zosyn   surgery follow up   follow up lactate   ================ Heme==================================  Anemia   Anemia panel     elevated INR   INR: 2.9, most likely due to Xarelto,    Will start on full dose on  Lovenox     =================Endocrine===============================  No active issues   follow A1c   NPO FS q4   ================= Skin/Catheters============================   Peripheral IV lines.     - =================Prophylaxis =============================  DVT proph: full dose Lovenox   GI proph : protonix     ==================GOC==================================  DNR only. MOLST in charts   ==================Disposition===============================  Pt refused surgery   Will admit to AI      65y Female from [facility], AAO x3,  with PMH of recurrent SBO's, s/p exp lap, SB resection in 2015, ex lap, ALBINA in 2018, DVT, PE, on Xarelto, IVC filter, decubitus ulcer with OM of sacrum in the past, chronic leg swelling, and anasarca in the past, came in to the ED presents from NH with shortness of breath, hypotension and vomiting since yesterday with associated increased abdominal distention and abdominal pain since yesterday. Last BM yesterday, denies passing flatus since yesterday. Reports abdominal discomfort. This AM NH staff noted low blood pressure thus sent her to ED for evaluation. Denies fevers, cough, chest pain, diarrhea or urinary symptoms.      Assessment:   - High grade bowel obstruction   - Hypotension   - lactic acidosis   - Decubitus ulcer   - h/o DVT, PE s/p IVC filters on Xarelto   - elevated INR   - Anemia   -UTI       =================== Neuro============================  Alert and oriented x 3     ================= Cardiovascular==========================  Hypotension, SBP: 70, on admission, s/p 3L NS   improved to SBP: 100s (baseline BP in 90s)  will recommend 1L Ns bolus more   -c/w IV fluids     ================- Pulm=================================  - h/o DVT, PE s/p IVC filters on Xarelto  will start on full dose Lovenox for now       ==================ID===================================  decubitus ulcer   Zosyn vanco recommended   - Dr Patricio consulted for ID     impending perforation:   Pt noted to have high grade Obstruction, refused surgery.   c/w Zosyn for intraabdominal pathology   follow Blood culture       UTI:   UA: positive   Zosyn   follow urine culture   ================= Nephro================================   lactic acidosis   Lactate: 8, s/p 3L Ns, repeat Lactate: 9, another 1L Ns bolus ordered.   follow repeat Lactate   c/w IV F       =================GI====================================  High grade bowel obstruction   Pt refused surgery   SUMP tube to suction   NPO   Zosyn   surgery follow up   follow up lactate   ================ Heme==================================  Anemia   Anemia panel     elevated INR   INR: 2.9, most likely due to Xarelto,    Will start on full dose on  Lovenox     =================Endocrine===============================  No active issues   follow A1c   NPO FS q4   ================= Skin/Catheters============================   Peripheral IV lines.     - =================Prophylaxis =============================  DVT proph: full dose Lovenox   GI proph : protonix     ==================GOC==================================  DNR only. MOLST in charts   ==================Disposition===============================  Pt refused surgery   Will admit to AI

## 2021-02-23 NOTE — CONSULT NOTE ADULT - ASSESSMENT
Patient is a 65y old  Female from [facility], AAO x3,  with PMH of recurrent SBO's, s/p exp lap, Short Bowel resection in 2015, ex lap, ALBINA in 2018, DVT, PE, on Xarelto, IVC filter, decubitus ulcer with OM of sacrum,  had prolonged hospitalization, discharged in Dec 2020, admitted for septic shock with Bacteroides fragilis Bacteremia most likely GI source, requiring intubation and pressors, sacral ulcer with OM of sacrum, HCAP, pt treated with Linezolid and fluconazole, now send in to the ER from NH for evaluation of shortness of breath, hypotension and vomiting, associated with increased abdominal distention and pain. The  Last BM was yesterday. ON admission, she found to have tachycardia, hypotension, BP of 69/50 and Leukocytosis. The CT abd/pelvis show High-grade distal small bowel obstruction. Swirling of mesenteric vessels suggestive of volvulus/internal hernia/closed obstruction. Resulting abnormally distended fluid-filled esophagus. Ascites and Bilateral lower lobe consolidation. She has started on Zosyn and Vancomycin, and the ID consult requested to assist with further evaluation and antibiotic management.     # Septic shock ( Tachycardia + hypotension, BP of 69/50 + Leukocytosis)  # B/L lower Lobe pneumonia  # High-grade distal small bowel obstruction    would recommend:    1. Follow up Blood cultures  2. Monitor WBC  count  3. Management of High grade SBO as per Surgery/ICU  4. Continue Zosyn and Vancomycin until work up is done  5. Monitor and Keep Vancomycin level between 15 to 20  6. Pain management as needed    will follow the patient with you and make further recommendation based on the clinical course and Lab results  Thank you for the opportunity to participate in Ms. PADILLA's care      Attending Attestation:    Spent more than 65 minutes on total encounter, more than 50 % of the visit was spent counseling and/or coordinating care by the Attending physician.

## 2021-02-23 NOTE — H&P ADULT - NSHPLABSRESULTS_GEN_ALL_CORE
LABS                      11.0   12.86 )-----------( 250      ( 23 Feb 2021 06:42 )             36.2   02-23    139  |  105  |  26<H>  ----------------------------<  148<H>  5.3   |  18<L>  |  1.16    Ca    8.6      23 Feb 2021 06:42    TPro  7.5  /  Alb  2.8<L>  /  TBili  0.9  /  DBili  x   /  AST  54<H>  /  ALT  56  /  AlkPhos  182<H>  02-23    Lactate, Blood (02.23.21 @ 08:57)    Lactate, Blood: 9.0: TYPE:(C=Critical, N=Notification, A=Abnormal) C  TESTS: _LACTATE  DATE/TIME CALLED: 02/23/2021 09:33:22 EST  CALLED TO: THERON PINO MD  READ BACK (2 Patient Identifiers)(Y/N): Y  READ BACK VALUES (Y/N): Y  CALLED BY: LISSA NOGUERA, 02/23/2021 09:34:35 EST mmol/L      < from: CT Angio Chest w/ IV Cont (02.23.21 @ 10:03) >    FINDINGS:  CHEST:  LUNGS AND LARGE AIRWAYS: Patent central airways. No pulmonary nodules. Bilateral lower lobe consolidation,greater on the left, secondary to atelectasis and/or infiltrate.  PLEURA: Small bilateral pleural effusions, improved from prior.  VESSELS: No evidence of pulmonary embolism. Pulmonary emboli seen in 2019 have resolved. Aberrant right subclavian artery reidentified.  HEART: Moderate cardiomegaly. No pericardial effusion.  MEDIASTINUM AND VIRGILIO: No lymphadenopathy. Abnormally distended fluid-filled esophagus.  CHEST WALL AND LOWER NECK: Within normal limits.    ABDOMEN AND PELVIS:  LIVER: Within normal limits.  BILE DUCTS: Normal caliber.  GALLBLADDER: Cholelithiasis.  SPLEEN: Within normal limits.  PANCREAS: Within normal limits.  ADRENALS: Within normal limits.  KIDNEYS/URETERS: 7 mm right midpole renal cyst.    BLADDER: Collapsed around a Cain catheter.  REPRODUCTIVE ORGANS: Unremarkable uterus.    BOWEL: Small bowel anastomosis in the left abdomen. Markedly distended fecalized small bowel with loops measuring up to 7.6 cm in caliber. The colon is completely collapsed. Appendix is not visualized and cannot be assessed.  PERITONEUM: Moderate ascites unchanged from 10/21/2020. No pneumoperitoneum.  VESSELS: Swirling of mesenteric vessels (17:64-83). IVC filter in situ. Patent celiac axis, SMA and KINA. No portal venous gas.  RETROPERITONEUM/LYMPH NODES: No lymphadenopathy.  ABDOMINAL WALL: Within normal limits.  BONES: T5 compression fracture unchanged.    IMPRESSION:  No evidence of pulmonary embolism.  High-grade distal small bowel obstruction. Swirling of mesenteric vessels suggestive of volvulus/internal hernia/closed obstruction. Resulting abnormally distended fluid-filled esophagus. Ascites.   Bilateral lower lobe consolidation secondary to atelectasis and/or pneumonia. Small bilateral pleural effusions.  Chronic findings as above.    CRITICAL VALUE: I discussed the major findings in this report with Dr. Reyes in the ICU at 2/23/2021 10:33 AM with readback. Critical value policy of the hospital was followed. Read back and confirmation of receipt of this communication was  performed. This verbal communication supplements the text report of this document.  < end of copied text > LABS                      11.0   12.86 )-----------( 250      ( 23 Feb 2021 06:42 )             36.2   02-23    139  |  105  |  26<H>  ----------------------------<  148<H>  5.3   |  18<L>  |  1.16    Ca    8.6      23 Feb 2021 06:42    TPro  7.5  /  Alb  2.8<L>  /  TBili  0.9  /  DBili  x   /  AST  54<H>  /  ALT  56  /  AlkPhos  182<H>  02-23    Lactate, Blood (02.23.21 @ 08:57)    Lactate, Blood: 9.0: TYPE:(C=Critical, N=Notification, A=Abnormal) C  TESTS: _LACTATE  DATE/TIME CALLED: 02/23/2021 09:33:22 EST  CALLED TO: THERON PINO MD  READ BACK (2 Patient Identifiers)(Y/N): Y  READ BACK VALUES (Y/N): Y  CALLED BY: LISSA NOGUERA, 02/23/2021 09:34:35 EST mmol/L      < from: CT Angio Chest w/ IV Cont (02.23.21 @ 10:03) >  FINDINGS:  CHEST:  LUNGS AND LARGE AIRWAYS: Patent central airways. No pulmonary nodules. Bilateral lower lobe consolidation,greater on the left, secondary to atelectasis and/or infiltrate.  PLEURA: Small bilateral pleural effusions, improved from prior.  VESSELS: No evidence of pulmonary embolism. Pulmonary emboli seen in 2019 have resolved. Aberrant right subclavian artery reidentified.  HEART: Moderate cardiomegaly. No pericardial effusion.  MEDIASTINUM AND VIRGILIO: No lymphadenopathy. Abnormally distended fluid-filled esophagus.  CHEST WALL AND LOWER NECK: Within normal limits.    ABDOMEN AND PELVIS:  LIVER: Within normal limits.  BILE DUCTS: Normal caliber.  GALLBLADDER: Cholelithiasis.  SPLEEN: Within normal limits.  PANCREAS: Within normal limits.  ADRENALS: Within normal limits.  KIDNEYS/URETERS: 7 mm right midpole renal cyst.    BLADDER: Collapsed around a Cain catheter.  REPRODUCTIVE ORGANS: Unremarkable uterus.    BOWEL: Small bowel anastomosis in the left abdomen. Markedly distended fecalized small bowel with loops measuring up to 7.6 cm in caliber. The colon is completely collapsed. Appendix is not visualized and cannot be assessed.  PERITONEUM: Moderate ascites unchanged from 10/21/2020. No pneumoperitoneum.  VESSELS: Swirling of mesenteric vessels (17:64-83). IVC filter in situ. Patent celiac axis, SMA and KINA. No portal venous gas.  RETROPERITONEUM/LYMPH NODES: No lymphadenopathy.  ABDOMINAL WALL: Within normal limits.  BONES: T5 compression fracture unchanged.    IMPRESSION:  No evidence of pulmonary embolism.  High-grade distal small bowel obstruction. Swirling of mesenteric vessels suggestive of volvulus/internal hernia/closed obstruction. Resulting abnormally distended fluid-filled esophagus. Ascites.   Bilateral lower lobe consolidation secondary to atelectasis and/or pneumonia. Small bilateral pleural effusions.  Chronic findings as above.    CRITICAL VALUE: I discussed the major findings in this report with Dr. Reyes in the ICU at 2/23/2021 10:33 AM with readback. Critical value policy of the hospital was followed. Read back and confirmation of receipt of this communication was  performed. This verbal communication supplements the text report of this document.  < end of copied text >

## 2021-02-23 NOTE — H&P ADULT - ASSESSMENT
65F with PMH of recurrent SBO's, s/p exp lap, SB resection in 2015, ex lap, ALBINA in 2018 presents with high grade SBO    - NGT placed at bedside, continue NGT to suction  -  65F with PMH of recurrent SBO's, s/p exp lap, SB resection in 2015, ex lap, ALBINA in 2018 presents with high grade SBO    - NGT placed at bedside, continue NGT to suction  - IVF/NPO  -

## 2021-02-23 NOTE — H&P ADULT - NSHPPHYSICALEXAM_GEN_ALL_CORE
ICU Vital Signs Last 24 Hrs  T(C): 36.8 (23 Feb 2021 15:42), Max: 36.9 (23 Feb 2021 11:58)  T(F): 98.2 (23 Feb 2021 15:42), Max: 98.5 (23 Feb 2021 11:58)  HR: 101 (23 Feb 2021 15:42) (99 - 135)  BP: 84/59 (23 Feb 2021 15:42) (69/50 - 101/62)  BP(mean): --  ABP: --  ABP(mean): --  RR: 18 (23 Feb 2021 15:42) (16 - 18)  SpO2: 95% (23 Feb 2021 15:42) (95% - 100%)      PHYSICAL EXAM:  GENERAL: female in bed, in no acute distress   HEENT: Normocephalic;  conjunctivae and sclerae clear; moist mucous membranes;   NECK : supple  CHEST/LUNG: Clear to auscultation bilaterally with good air entry   HEART: S1 S2  regular; tachycardia   ABDOMEN: distended, tense, non tender, no BS   EXTREMITIES: no cyanosis; no edema; no calf tenderness  SKIN: sacral ulcer present   NERVOUS SYSTEM:  Awake and alert; Oriented  to place, person and time ; no new deficits

## 2021-02-23 NOTE — H&P ADULT - NSHPPHYSICALEXAM_GEN_ALL_CORE
ICU Vital Signs Last 24 Hrs  T(C): 36.7 (23 Feb 2021 07:09), Max: 36.7 (23 Feb 2021 05:54)  T(F): 98 (23 Feb 2021 07:09), Max: 98 (23 Feb 2021 05:54)  HR: 113 (23 Feb 2021 07:09) (113 - 135)  BP: 81/56 (23 Feb 2021 07:09) (69/50 - 81/56)  BP(mean): --  ABP: --  ABP(mean): --  RR: 16 (23 Feb 2021 05:54) (16 - 16)  SpO2: 98% (23 Feb 2021 05:54) (98% - 98%)    PHYSICAL EXAM:  GENERAL: female in bed, in no acute distress   HEENT: Normocephalic;  conjunctivae and sclerae clear; moist mucous membranes;   NECK : supple  CHEST/LUNG: Clear to auscultation bilaterally with good air entry   HEART: S1 S2  regular; tachycardia   ABDOMEN: distended, tense, non tender, no BS   EXTREMITIES: no cyanosis; no edema; no calf tenderness  SKIN: sacral ulcer present   NERVOUS SYSTEM:  Awake and alert; Oriented  to place, person and time ; no new deficits

## 2021-02-23 NOTE — ED PROVIDER NOTE - CLINICAL SUMMARY MEDICAL DECISION MAKING FREE TEXT BOX
64yo F with hx recurrent SBO's, s/p exp lap, SB resection in 2015, ex lap, ALBINA in 2018, DVT, PE, on Xarelto, IVC filter, chronic leg swelling, and anasarca presents from NH with shortness of breath, hypotension and vomiting. on ID arrival patient tachycardic and hypotensive. Will obtains ekg, labs, CXR, CTA chest and CT A/P.. Given 3 L NS, empiric abx. Will reassess. 66yo F with hx recurrent SBO's, s/p exp lap, SB resection in 2015, ex lap, ALBINA in 2018, DVT, PE, on Xarelto, IVC filter, chronic leg swelling, and anasarca presents from NH with shortness of breath, hypotension and vomiting. on ID arrival patient tachycardic and hypotensive. Will obtains ekg, labs, CXR, CTA chest and CT A/P.. Given 3 L NS, empiric abx. Will reassess.    lactate 8.1  Dr. Balbuena/ICU consulted for further management.  patient signedout to Dr. Lagunas at 730am.

## 2021-02-23 NOTE — CONSULT NOTE ADULT - SUBJECTIVE AND OBJECTIVE BOX
Patient is a 65y old  Female who presents with a chief complaint of abdominal distention (2021 16:15)      REVIEW OF SYSTEMS: Total of twelve systems have been reviewed  and found to be negative unless mentioned in HPI          PAST MEDICAL & SURGICAL HISTORY:  Anasarca  Pulmonary embolism  DVT (deep venous thrombosis)  SBO (small bowel obstruction)  ex-lap  H/O exploratory laparotomy        SOCIAL HISTORY  Alcohol: Does not drink  Tobacco: Does not smoke  Illicit substance use: None      FAMILY HISTORY: Non contributory to the present illness        AALERGIES; No Known Allergies        ICU Vital Signs Last 24 Hrs  T(C): 34.4 (2021 20:44), Max: 37.1 (2021 19:25)  T(F): 93.9 (2021 20:44), Max: 98.7 (2021 19:25)  HR: 104 (2021 20:30) (99 - 135)  BP: 85/55 (2021 20:30) (69/50 - 101/62)  BP(mean): 62 (2021 20:30) (62 - 62)  ABP: --  ABP(mean): --  RR: 56 (2021 20:30) (16 - 56)  SpO2: 94% (2021 20:30) (94% - 100%)        PHYSICAL EXAM:  GENERAL: Not in distress   CHEST/LUNG:  Aire ntry bilaterally  HEART: s1 and s2 present  ABDOMEN:  Nontender and  Nondistended  EXTREMITIES: No pedal  edema  CNS: Awake and Alert      LABS:                        11.0   12.86 )-----------( 250      ( 2021 06:42 )             36.2         139  |  105  |  26<H>  ----------------------------<  148<H>  5.3   |  18<L>  |  1.16    Ca    8.6      2021 06:42    TPro  7.5  /  Alb  2.8<L>  /  TBili  0.9  /  DBili  x   /  AST  54<H>  /  ALT  56  /  AlkPhos  182<H>      PT/INR - ( 2021 07:28 )   PT: 33.1 sec;   INR: 2.93 ratio     PTT - ( 2021 07:28 )  PTT:84.1 sec      CAPILLARY BLOOD GLUCOSE  POCT Blood Glucose.: 147 mg/dL (2021 06:09)        Urinalysis Basic - ( 2021 08:44 )  Color: Sue / Appearance: Clear / S.025 / pH: x  Gluc: x / Ketone: Trace  / Bili: Moderate / Urobili: 4   Blood: x / Protein: 100 / Nitrite: Positive   Leuk Esterase: Small / RBC: 2-5 /HPF / WBC 6-10 /HPF   Sq Epi: x / Non Sq Epi: Negative /HPF / Bacteria: Moderate /HPF        MEDICATIONS  (STANDING):  chlorhexidine 2% Cloths 1 Application(s) Topical <User Schedule>  enoxaparin Injectable 55 milliGRAM(s) SubCutaneous two times a day  ketorolac   Injectable 30 milliGRAM(s) IV Push every 8 hours  pantoprazole  Injectable 40 milliGRAM(s) IV Push daily  phenylephrine    Infusion 0.1 MICROgram(s)/kG/Min (2.25 mL/Hr) IV Continuous <Continuous>  piperacillin/tazobactam IVPB.. 3.375 Gram(s) IV Intermittent every 8 hours  sodium chloride 0.9%. 1000 milliLiter(s) (100 mL/Hr) IV Continuous <Continuous>  vancomycin  IVPB 750 milliGRAM(s) IV Intermittent <User Schedule>    MEDICATIONS  (PRN):  ondansetron Injectable 4 milliGRAM(s) IV Push every 4 hours PRN Nausea and/or Vomiting          RADIOLOGY & ADDITIONAL TESTS:             Patient is a 65y old  Female from [facility], AAO x3,  with PMH of recurrent SBO's, s/p exp lap, Short Bowel resection in , ex lap, ALBINA in 2018, DVT, PE, on Xarelto, IVC filter, decubitus ulcer with OM of sacrum,  had prolonged hospitalization, discharged in Dec 2020, admitted for septic shock with Bacteroides fragilis Bacteremia most likely GI source, requiring intubation and pressors, sacral ulcer with OM of sacrum, HCAP, pt treated with Linezolid and fluconazole, now send in to the ER from NH for evaluation of shortness of breath, hypotension and vomiting, associated with increased abdominal distention and pain. The  Last BM was yesterday. ON admission, she found to have tachycardia, hypotension, BP of 69/50 and Leukocytosis. The CT abd/pelvis show High-grade distal small bowel obstruction. Swirling of mesenteric vessels suggestive of volvulus/internal hernia/closed obstruction. Resulting abnormally distended fluid-filled esophagus. Ascites and Bilateral lower lobe consolidation. She has started on Zosyn and Vancomycin, and the ID consult requested to assist with further evaluation and antibiotic management.       REVIEW OF SYSTEMS: Total of twelve systems have been reviewed  and found to be negative unless mentioned in HPI        PAST MEDICAL & SURGICAL HISTORY:  Anasarca  Pulmonary embolism  DVT (deep venous thrombosis)  SBO (small bowel obstruction)  ex-lap  H/O exploratory laparotomy        SOCIAL HISTORY  Alcohol: Does not drink  Tobacco: Does not smoke  Illicit substance use: None      FAMILY HISTORY: Non contributory to the present illness      ALLERGIES ; No Known Allergies      ICU Vital Signs Last 24 Hrs  T(C): 34.4 (2021 20:44), Max: 37.1 (2021 19:25)  T(F): 93.9 (2021 20:44), Max: 98.7 (2021 19:25)  HR: 104 (2021 20:30) (99 - 135)  BP: 85/55 (2021 20:30) (69/50 - 101/62)  BP(mean): 62 (2021 20:30) (62 - 62)  ABP: --  ABP(mean): --  RR: 56 (2021 20:30) (16 - 56)  SpO2: 94% (2021 20:30) (94% - 100%)        PHYSICAL EXAM:  GENERAL: Not in distress   HEENT: NGT in placed   CHEST/LUNG: Not using accessory muscles   HEART: s1 and s2 present  ABDOMEN: Very distended and tender   EXTREMITIES: No pedal  edema  CNS: Awake and Alert      LABS:                        11.0   12.86 )-----------( 250      ( 2021 06:42 )             36.2         139  |  105  |  26<H>  ----------------------------<  148<H>  5.3   |  18<L>  |  1.16    Ca    8.6      2021 06:42    TPro  7.5  /  Alb  2.8<L>  /  TBili  0.9  /  DBili  x   /  AST  54<H>  /  ALT  56  /  AlkPhos  182<H>      PT/INR - ( 2021 07:28 )   PT: 33.1 sec;   INR: 2.93 ratio     PTT - ( 2021 07:28 )  PTT:84.1 sec      CAPILLARY BLOOD GLUCOSE  POCT Blood Glucose.: 147 mg/dL (2021 06:09)        Urinalysis Basic - ( 2021 08:44 )  Color: Sue / Appearance: Clear / S.025 / pH: x  Gluc: x / Ketone: Trace  / Bili: Moderate / Urobili: 4   Blood: x / Protein: 100 / Nitrite: Positive   Leuk Esterase: Small / RBC: 2-5 /HPF / WBC 6-10 /HPF   Sq Epi: x / Non Sq Epi: Negative /HPF / Bacteria: Moderate /HPF        MEDICATIONS  (STANDING):  chlorhexidine 2% Cloths 1 Application(s) Topical <User Schedule>  enoxaparin Injectable 55 milliGRAM(s) SubCutaneous two times a day  ketorolac   Injectable 30 milliGRAM(s) IV Push every 8 hours  pantoprazole  Injectable 40 milliGRAM(s) IV Push daily  phenylephrine    Infusion 0.1 MICROgram(s)/kG/Min (2.25 mL/Hr) IV Continuous <Continuous>  piperacillin/tazobactam IVPB.. 3.375 Gram(s) IV Intermittent every 8 hours  sodium chloride 0.9%. 1000 milliLiter(s) (100 mL/Hr) IV Continuous <Continuous>  vancomycin  IVPB 750 milliGRAM(s) IV Intermittent <User Schedule>    MEDICATIONS  (PRN):  ondansetron Injectable 4 milliGRAM(s) IV Push every 4 hours PRN Nausea and/or Vomiting        RADIOLOGY & ADDITIONAL TESTS:    21 : CT Abdomen and Pelvis w/ IV Cont (21 @ 10:03) No evidence of pulmonary embolism.    High-grade distal small bowel obstruction. Swirling of mesenteric vessels suggestive of volvulus/internal hernia/closed obstruction. Resulting abnormally distended fluid-filled esophagus. Ascites.    Bilateral lower lobe consolidation secondary to atelectasis and/or pneumonia. Small bilateral pleural effusions.

## 2021-02-24 NOTE — PROGRESS NOTE ADULT - ATTENDING COMMENTS
pt seen. Tachypneic.  Abdomen distended, tender on deep palpation  Recommended explorative laparotomy. She does not want it. She said it is not time yet  She is alert and oriented and understands  Spoke to her daughter. She said to go by her Mom  So far she is refusing surgery  Discussed with her that by waiting she can also get more septic  Also discussed the situation with ICU attending  She is also high risk for the surgery

## 2021-02-24 NOTE — CONSULT NOTE ADULT - SUBJECTIVE AND OBJECTIVE BOX
HPI:  65y Female from [facility], AAO x3,  with PMH of recurrent SBO's, s/p exp lap, SB resection in 2015, ex lap, ALBINA in 2018, DVT, PE, on Xarelto, IVC filter, decubitus ulcer with OM of sacrum in the past, chronic leg swelling, and anasarca in the past, came in to the ED presents from NH with shortness of breath, hypotension and vomiting since yesterday with associated increased abdominal distention and abdominal pain since yesterday. Last BM yesterday, denies passing flatus since yesterday. Reports abdominal discomfort. This AM NH staff noted low blood pressure thus sent her to ED for evaluation. Denies fevers, cough, chest pain, diarrhea or urinary symptoms.    off note, pt had prolonged hospitalization, discharged in Dec 2020, admitted for septic shock with Bacteroides fragilis Bacteremia most likely GI source, requiring intubation and pressors, sacral ulcer with OM of sacrum, HCAP, pt treated with Linezolid and fluconazole. Pt was hospitalized from Oct to Dec 2020, discharged to [facility].  (2021 16:15)      PAST MEDICAL & SURGICAL HISTORY:  Anasarca    Pulmonary embolism    DVT (deep venous thrombosis)    SBO (small bowel obstruction)  ex-lap    H/O exploratory laparotomy        SOCIAL HISTORY:    Admitted from:  home assisted living [facility]   Substance abuse history:              Tobacco hx:                  Alcohol hx:              Home Opioid hx:  Advent:                                    Preferred Language:    Surrogate/HCP/Guardian:            Phone#:    FAMILY HISTORY:  No pertinent family history in first degree relatives      Baseline ADLs (prior to admission):    Allergies    No Known Allergies    Intolerances      Present Symptoms:   Dyspnea:   Nausea/Vomiting:   Anxiety:  Depressed   Fatigue:  Loss of appetite:   Pain:                                location:          Review of Systems: [All others negative or Unable to obtain due to poor mentation]    MEDICATIONS  (STANDING):  chlorhexidine 2% Cloths 1 Application(s) Topical <User Schedule>  enoxaparin Injectable 55 milliGRAM(s) SubCutaneous two times a day  etomidate Injectable 20 milliGRAM(s) IV Push once  morphine  - Injectable 2 milliGRAM(s) IV Push once  pantoprazole  Injectable 40 milliGRAM(s) IV Push daily  phenylephrine    Infusion 3 MICROgram(s)/kG/Min (67.4 mL/Hr) IV Continuous <Continuous>  piperacillin/tazobactam IVPB.. 3.375 Gram(s) IV Intermittent every 8 hours  propofol Infusion 5 MICROgram(s)/kG/Min (1.8 mL/Hr) IV Continuous <Continuous>  sodium chloride 0.9% Bolus 2000 milliLiter(s) IV Bolus once  sodium chloride 0.9%. 1000 milliLiter(s) (100 mL/Hr) IV Continuous <Continuous>  succinylcholine Injectable 100 milliGRAM(s) IV Push once  vancomycin  IVPB 750 milliGRAM(s) IV Intermittent <User Schedule>    MEDICATIONS  (PRN):  ondansetron Injectable 4 milliGRAM(s) IV Push every 4 hours PRN Nausea and/or Vomiting      PHYSICAL EXAM:    Vital Signs Last 24 Hrs  T(C): 35.8 (2021 08:00), Max: 37.1 (2021 19:25)  T(F): 96.4 (2021 08:00), Max: 98.7 (2021 19:25)  HR: 119 (2021 10:00) (99 - 126)  BP: 73/55 (2021 10:00) (72/50 - 100/58)  BP(mean): 59 (2021 10:00) (52 - 78)  RR: 42 (2021 10:00) (12 - 62)  SpO2: 71% (2021 10:00) (71% - 100%)    General: alert  oriented x ____    [lethargic distressed cachexia  nonverbal  unarousable verbal]  Karnofsky Performance Score/Palliative Performance Status Version2:     %    HEENT: normal  dry mouth  ET tube/trach oral lesions:  Lungs: comfortable tachypnea/labored breathing  excessive secretions  CV: normal  tachycardia  GI: normal  distended  tender  incontinent               PEG/NG/OG tube  constipation  last BM:   : normal  incontinent  oliguria/anuria  velazquez  Musculoskeletal: normal  weakness  edema             ambulatory  bedbound/wheelchair bound  Skin: normal  pressure ulcers: stage: edema: other:  Neuro: no deficits cognitive impairment dsyphagia/dysarthria paresis: other:  Oral intake ability: unable/only mouth care [minimal moderate full capability]  Diet: [NPO]    LABS:                        8.5    3.14  )-----------( 403      ( 2021 07:23 )             27.9     02-24    144  |  111<H>  |  31<H>  ----------------------------<  52<LL>  4.7   |  18<L>  |  1.11    Ca    7.9<L>      2021 07:23  Phos  6.0       Mg     2.3         TPro  6.3  /  Alb  2.5<L>  /  TBili  0.9  /  DBili  x   /  AST  74<H>  /  ALT  51  /  AlkPhos  100      Urinalysis Basic - ( 2021 08:44 )    Color: Sue / Appearance: Clear / S.025 / pH: x  Gluc: x / Ketone: Trace  / Bili: Moderate / Urobili: 4   Blood: x / Protein: 100 / Nitrite: Positive   Leuk Esterase: Small / RBC: 2-5 /HPF / WBC 6-10 /HPF   Sq Epi: x / Non Sq Epi: Negative /HPF / Bacteria: Moderate /HPF        RADIOLOGY & ADDITIONAL STUDIES:    ADVANCE DIRECTIVES:   Advanced Care Planning discussion total time spent:   HPI:  65y Female from [facility], AAO x3,  with PMH of recurrent SBO's, s/p exp lap, SB resection in 2015, ex lap, ALBINA in 2018, DVT, PE, on Xarelto, IVC filter, decubitus ulcer with OM of sacrum in the past, chronic leg swelling, and anasarca in the past, came in to the ED presents from NH with shortness of breath, hypotension and vomiting since yesterday with associated increased abdominal distention and abdominal pain since yesterday. Last BM yesterday, denies passing flatus since yesterday. Reports abdominal discomfort. This AM NH staff noted low blood pressure thus sent her to ED for evaluation. Denies fevers, cough, chest pain, diarrhea or urinary symptoms.    off note, pt had prolonged hospitalization, discharged in Dec 2020, admitted for septic shock with Bacteroides fragilis Bacteremia most likely GI source, requiring intubation and pressors, sacral ulcer with OM of sacrum, HCAP, pt treated with Linezolid and fluconazole. Pt was hospitalized from Oct to Dec 2020, discharged to [facility].  (2021 16:15)      PAST MEDICAL & SURGICAL HISTORY:  Anasarca  Pulmonary embolism  DVT (deep venous thrombosis)  SBO (small bowel obstruction)  ex-lap  H/O exploratory laparotomy      SOCIAL HISTORY:    Admitted from:  home assisted living [facility]   Substance abuse history:              Tobacco hx:                  Alcohol hx:              Home Opioid hx:  Advent:                                    Preferred Language:    Surrogate/HCP/Guardian:            Phone#:    FAMILY HISTORY:  No pertinent family history in first degree relatives      Baseline ADLs (prior to admission):    Allergies    No Known Allergies    Intolerances      Present Symptoms:   Dyspnea:   Nausea/Vomiting:   Anxiety:  Depressed   Fatigue:  Loss of appetite:   Pain:                                location:          Review of Systems: [All others negative or Unable to obtain due to poor mentation]    MEDICATIONS  (STANDING):  chlorhexidine 2% Cloths 1 Application(s) Topical <User Schedule>  enoxaparin Injectable 55 milliGRAM(s) SubCutaneous two times a day  etomidate Injectable 20 milliGRAM(s) IV Push once  morphine  - Injectable 2 milliGRAM(s) IV Push once  pantoprazole  Injectable 40 milliGRAM(s) IV Push daily  phenylephrine    Infusion 3 MICROgram(s)/kG/Min (67.4 mL/Hr) IV Continuous <Continuous>  piperacillin/tazobactam IVPB.. 3.375 Gram(s) IV Intermittent every 8 hours  propofol Infusion 5 MICROgram(s)/kG/Min (1.8 mL/Hr) IV Continuous <Continuous>  sodium chloride 0.9% Bolus 2000 milliLiter(s) IV Bolus once  sodium chloride 0.9%. 1000 milliLiter(s) (100 mL/Hr) IV Continuous <Continuous>  succinylcholine Injectable 100 milliGRAM(s) IV Push once  vancomycin  IVPB 750 milliGRAM(s) IV Intermittent <User Schedule>    MEDICATIONS  (PRN):  ondansetron Injectable 4 milliGRAM(s) IV Push every 4 hours PRN Nausea and/or Vomiting      PHYSICAL EXAM:    Vital Signs Last 24 Hrs  T(C): 35.8 (2021 08:00), Max: 37.1 (2021 19:25)  T(F): 96.4 (2021 08:00), Max: 98.7 (2021 19:25)  HR: 119 (2021 10:00) (99 - 126)  BP: 73/55 (2021 10:00) (72/50 - 100/58)  BP(mean): 59 (2021 10:00) (52 - 78)  RR: 42 (2021 10:00) (12 - 62)  SpO2: 71% (2021 10:00) (71% - 100%)    General: alert  oriented x ____    [lethargic distressed cachexia  nonverbal  unarousable verbal]  Karnofsky Performance Score/Palliative Performance Status Version2:     %    HEENT: normal  dry mouth  ET tube/trach oral lesions:  Lungs: comfortable tachypnea/labored breathing  excessive secretions  CV: normal  tachycardia  GI: normal  distended  tender  incontinent               PEG/NG/OG tube  constipation  last BM:   : normal  incontinent  oliguria/anuria  velazquez  Musculoskeletal: normal  weakness  edema             ambulatory  bedbound/wheelchair bound  Skin: normal  pressure ulcers: stage: edema: other:  Neuro: no deficits cognitive impairment dsyphagia/dysarthria paresis: other:  Oral intake ability: unable/only mouth care [minimal moderate full capability]  Diet: [NPO]    LABS:                        8.5    3.14  )-----------( 403      ( 2021 07:23 )             27.9     02-24    144  |  111<H>  |  31<H>  ----------------------------<  52<LL>  4.7   |  18<L>  |  1.11    Ca    7.9<L>      2021 07:23  Phos  6.0       Mg     2.3         TPro  6.3  /  Alb  2.5<L>  /  TBili  0.9  /  DBili  x   /  AST  74<H>  /  ALT  51  /  AlkPhos  100      Urinalysis Basic - ( 2021 08:44 )    Color: Sue / Appearance: Clear / S.025 / pH: x  Gluc: x / Ketone: Trace  / Bili: Moderate / Urobili: 4   Blood: x / Protein: 100 / Nitrite: Positive   Leuk Esterase: Small / RBC: 2-5 /HPF / WBC 6-10 /HPF   Sq Epi: x / Non Sq Epi: Negative /HPF / Bacteria: Moderate /HPF        RADIOLOGY & ADDITIONAL STUDIES:    ADVANCE DIRECTIVES:   Advanced Care Planning discussion total time spent:

## 2021-02-24 NOTE — PROGRESS NOTE ADULT - ASSESSMENT
65y Female from [facility], AAO x3,  with PMH of recurrent SBO's, s/p exp lap, SB resection in 2015, ex lap, ALBINA in 2018, DVT, PE, on Xarelto, IVC filter, decubitus ulcer with OM of sacrum in the past, chronic leg swelling, and anasarca in the past, came in to the ED presents from NH with shortness of breath, hypotension and vomiting since yesterday with associated increased abdominal distention and abdominal pain since yesterday. Last BM yesterday, denies passing flatus since yesterday. Reports abdominal discomfort. This AM NH staff noted low blood pressure thus sent her to ED for evaluation. Denies fevers, cough, chest pain, diarrhea or urinary symptoms.      Assessment:   - High grade bowel obstruction   - Hypotension   - lactic acidosis   - Decubitus ulcer   - h/o DVT, PE s/p IVC filters on Xarelto   - elevated INR   - Anemia   -UTI       =================== Neuro============================  Alert and oriented x 3     ================= Cardiovascular==========================  Hypotension, SBP: 70, on admission, s/p 3L NS   improved to SBP: 100s (baseline BP in 90s)  will recommend 1L Ns bolus more   -c/w IV fluids     ================- Pulm=================================  - h/o DVT, PE s/p IVC filters on Xarelto  will start on full dose Lovenox for now       ==================ID===================================  decubitus ulcer   Zosyn vanco recommended   - Dr Patricio consulted for ID     impending perforation:   Pt noted to have high grade Obstruction, refused surgery.   c/w Zosyn for intraabdominal pathology   follow Blood culture       UTI:   UA: positive   Zosyn   follow urine culture   ================= Nephro================================   lactic acidosis   Lactate: 8, s/p 3L Ns, repeat Lactate: 9, another 1L Ns bolus ordered.   follow repeat Lactate   c/w IV F       =================GI====================================  High grade bowel obstruction   Pt refused surgery   SUMP tube to suction   NPO   Zosyn   surgery follow up   follow up lactate   ================ Heme==================================  Anemia   Anemia panel     elevated INR   INR: 2.9, most likely due to Xarelto,    Will start on full dose on  Lovenox     =================Endocrine===============================  No active issues   follow A1c   NPO FS q4   ================= Skin/Catheters============================   Peripheral IV lines.     - =================Prophylaxis =============================  DVT proph: full dose Lovenox   GI proph : protonix     ==================GOC==================================  DNR only. MOLST in charts   ==================Disposition===============================  Pt refused surgery   Will admit to AI

## 2021-02-24 NOTE — CHART NOTE - NSCHARTNOTEFT_GEN_A_CORE
Palliative Care Note:  12:21PM Spoke with patient's daughter/surrogate, Jus Bucio ([phone]) on the phone. Aware patient unable to protect her airway 2/2 mental status and with existing MOLST on file indicating DNR / trial of intubation status. Daughter confirms wishes for DNR / trial intubation. Overall, patient with poor prognosis. All questions answered; supportive counseling provided.

## 2021-02-24 NOTE — PROGRESS NOTE ADULT - ASSESSMENT
65y.o. Female with recurrent SBO    -Keep NGT to LWS  -NPO  -Refrain from pain meds  -IVF 65y.o. Female with recurrent SBO    -Keep NGT to LWS  -NPO  -Record NGT output  -Refrain from pain meds  -IVF

## 2021-02-24 NOTE — CONSULT NOTE ADULT - SUBJECTIVE AND OBJECTIVE BOX
Patient is a 65y old  Female who presents with a chief complaint of Hypotension (16 Oct 2020 10:15)      HPI:  65y Female from home, lives with daughter, ambulates with walker with PMH of recurrent SBO's, s/p exp lap, SB resection in 2015, ex lap, ALBINA in 2018, DVT, PE, on Xarelto, IVC filter, chronic leg swelling, and anasarca, came in to the ED after being sent by PCP Dr. Ross for generalized weakness and hypotension during office visit, neurology called for lethargy. Patient was seen by PCP for weakness and chills and was sent to the ED after she was noted to be hypotensive with BP 80/40. Patient denies fever, SOB, palpitations, changes to her bowel or urinary habits, abdominal pain, urinary symptoms or any other acute complaints.    The  patient was found to be anemia and have signs of GI bleed.  She looked lethargic today to the primary team and CTH was done and neurology was called.      MEDICATIONS  (STANDING):  cefepime   IVPB      cefepime   IVPB 1000 milliGRAM(s) IV Intermittent every 8 hours  metroNIDAZOLE  IVPB 500 milliGRAM(s) IV Intermittent every 8 hours  metroNIDAZOLE  IVPB      pantoprazole  Injectable 40 milliGRAM(s) IV Push two times a day  senna 2 Tablet(s) Oral at bedtime    MEDICATIONS  (PRN):    Allergies    No Known Allergies    Intolerances      PAST MEDICAL & SURGICAL HISTORY:  Anasarca    Pulmonary embolism    DVT (deep venous thrombosis)    SBO (small bowel obstruction)    H/O exploratory laparotomy      FAMILY HISTORY:  No pertinent family history in first degree relatives      SOCIAL HISTORY: non smoker    Review of Systems:  Constitutional: No fevers.                    Eyes, Ears, Mouth, Throat: No vision loss   Respiratory: No cough.                                Cardiovascular: Uknonw if has chest pain or palpitations  Gastrointestinal: No vomiting.                                         Genitourinary: Uknown if has burning on urination.  Musculoskeletal: No known joint pain.                                                           Dermatologic: No known rash.  Neurological: as per HPI                                                                      Psychiatric: No behavioral problems.  Endocrine: No known hypoglycemia.               Hematologic/Lymphatic: No know easy bleeding.    O:  Vital Signs Last 24 Hrs  T(C): 36.8 (16 Oct 2020 08:32), Max: 36.8 (15 Oct 2020 23:02)  T(F): 98.2 (16 Oct 2020 08:32), Max: 98.2 (15 Oct 2020 23:02)  HR: 90 (16 Oct 2020 08:32) (88 - 90)  BP: 149/65 (16 Oct 2020 08:32) (101/67 - 149/65)  BP(mean): --  RR: 18 (16 Oct 2020 08:32) (18 - 18)  SpO2: 97% (16 Oct 2020 08:32) (97% - 99%)    General Exam:   General appearance: No acute distress                 Cardiovascular: Pedal dorsalis pulses intact bilaterally    Mental Status: Orientated to self but not to date and place.  Attention visually intact but the patient does not answer to questions or follow requests.  She says a few words only when she wants to, sounds appropriate without dysarthria or aphasia.  No neglect.  Knowledge, Registration and memory unreliable at this time.    Cranial Nerves: CN I - not tested.  PERRL, EOMI, blinks to threat bl temporal areas.  No APD.  Fundi not visualized.  CN V1-3 intact to light touch.  No facial asymmetry.  Hearing intact to finger rub bilaterally.  Tongue, uvula and palate midline.  Sternocleidomastoid and Trapezius intact bilaterally.    Motor:   Tone: normal.                  Strength: moves bl arms and legs equally  Patient does not cooperate with dysmetria on finger-nose-finger or heel-shin-heel  No truncal ataxia.  No resting, postural or action tremor.  No myoclonus.    Sensation: intact to light touch    Deep Tendon Reflexes: 1+ bilateral biceps, triceps, brachioradialis, knee and ankle  Toes flexor bilaterally    Gait: patient does not follow request    Other: Patient sitting comfortably eating lunch with the help of the RN    LABS:                        9.1    6.08  )-----------( 199      ( 16 Oct 2020 06:44 )             27.7     10-16    144  |  115<H>  |  14  ----------------------------<  75  3.3<L>   |  25  |  0.64    Ca    7.6<L>      16 Oct 2020 06:44  Mg     1.6     10-16              RADIOLOGY & ADDITIONAL STUDIES:    k< from: 12 Lead ECG (10.13.20 @ 14:18) >  Diagnosis Line Normal sinus rhythm  ST & T wave abnormality, consider anterior ischemia  Abnormal ECG    < end of copied text >    < from: CT Head No Cont (10.16.20 @ 11:01) > (images reviewed)  IMPRESSION:  1)  unremarkable CT study of the brain  2)  moderate mucosal disease noted in the right maxillary sinus.    < end of copied text >   No

## 2021-02-24 NOTE — CONSULT NOTE ADULT - CONSULT REASON
66 y/o F admitted to ICU for high grade obstruction, refused surgery. On pressor - now being intubated.
sbo
sepsis
acute abdomen

## 2021-02-24 NOTE — CHART NOTE - NSCHARTNOTEFT_GEN_A_CORE
I called daughter Jus Bucio [phone] informing her that pat become acutely unresponsive and need intubation for airway protection. AS per her MOLTS ,, DNR but trial of intubation. BP is low and is on pressors prior to intubation . Jus is aware of poor  prognosis I called daughter Jus Bucio [phone] informing her that pat become acutely unresponsive and need intubation for airway protection. AS per her MOLTS ,, DNR but trial of intubation. BP is low and is on pressors prior to intubation . Jus is aware of poor  prognosis    While patient in process of being intubated, patient became pulseless. As patient was DNR, CPR was not initiated. Patient's daughter, Jus Bucio was contacted at approximately 1:24 PM and informed of patient's passing.

## 2021-02-24 NOTE — DISCHARGE NOTE FOR THE EXPIRED PATIENT - HOSPITAL COURSE
65y Female from [facility], AAO x3,  with PMH of recurrent SBO's, s/p exp lap, SB resection in 2015, ex lap, ALBINA in 2018, DVT, PE, on Xarelto, IVC filter, decubitus ulcer with OM of sacrum in the past, chronic leg swelling, and anasarca in the past, came in to the ED presents from NH with shortness of breath, hypotension and vomiting since yesterday with associated increased abdominal distention and abdominal pain since yesterday. Last BM yesterday, denies passing flatus since yesterday. Reports abdominal discomfort. This AM NH staff noted low blood pressure thus sent her to ED for evaluation.   off note, pt had prolonged hospitalization, discharged in Dec 2020, admitted for septic shock with Bacteroides fragilis Bacteremia most likely GI source, requiring intubation and pressors, sacral ulcer with OM of sacrum, HCAP, pt treated with Linezolid and fluconazole. Pt was hospitalized from Oct to Dec 2020, discharged to [facility].  (23 Feb 2021 16:15)  patient was seen by surgery. Recommended explorative laparotomy. patient and daughter Andrew refused surgery.  Patient said it is not time yet  She was alert and oriented and understood. surgeon discussed with patient that by waiting she can also get more septic. The situation discussed  with ICU attending. Patient started on fluid resuscitation and and started on pressors. Called by nurse about patient being unresponsive. intubation started to protect airway. While patient in process of being intubated, patient became pulseless. As patient was DNR, CPR was not initiated. Patient's daughter, Andrew Bucio was contacted by Dr laura Melendez at approximately 1:24 PM and informed of patient's passing. pat become acutely unresponsive and need intubation for airway protection.

## 2021-02-25 NOTE — CHART NOTE - NSCHARTNOTEFT_GEN_A_CORE
patient's daughter Jus called requesting more information about what happened. Explained that shortly after intubation, patient's heart stopped and to respect patient;'s wishes, no resuscitation effort pursued. Jus then asked for her mother's belongings. All questions answered

## 2021-02-28 LAB
CULTURE RESULTS: SIGNIFICANT CHANGE UP
CULTURE RESULTS: SIGNIFICANT CHANGE UP
SPECIMEN SOURCE: SIGNIFICANT CHANGE UP
SPECIMEN SOURCE: SIGNIFICANT CHANGE UP

## 2021-06-10 NOTE — PATIENT PROFILE ADULT - LIVING ENVIRONMENT
"Chief Complaint   Patient presents with     RECHECK     REturn Dr. Peña PCI follow-up.  On December eighth 2020 patient had 2 stents to LAD,POBA to D1 and a stent to RPL branch.        Initial BP (!) 155/79 (BP Location: Right arm, Patient Position: Chair, Cuff Size: Adult Regular)   Pulse 66   Wt 76.2 kg (168 lb)   LMP  (LMP Unknown)   SpO2 97%   BMI 29.76 kg/m   Estimated body mass index is 29.76 kg/m  as calculated from the following:    Height as of 5/27/21: 1.6 m (5' 3\").    Weight as of this encounter: 76.2 kg (168 lb)..  BP completed using cuff size: regular    Raquel Oden MA  " no

## 2021-07-28 NOTE — ED ADULT NURSE NOTE - DISCHARGE DATE/TIME
04-Apr-2017 22:56 Graft Donor Site Bandage (Optional-Leave Blank If You Don't Want In Note): Steri-strips and a pressure bandage were applied to the donor site.

## 2021-08-06 NOTE — ED ADULT TRIAGE NOTE - CHIEF COMPLAINT QUOTE
Denies CP [address]  [address] [address] [place] [address]  Dept: [phone]  Dept Fax: [phone]  Loc: [phone]     Visit Date:  8/6/2021    Patient:  Mag De Leon  YOB: 1991    HPI:   Mag De Leon presents today for   Chief Complaint   Patient presents with    Other     patient is here to follow up from [address] [place] [address]. She did not get any answers and does not know where to go from here. Abdiaziz Horne HPI 76-year-old female Medical history is positive for chronic anxiety/depression/ anxiety, elevated inflammatory markers as well as probable migraine and Mobitz type I Wenckebach atrioventricular block. Seen at [facility] rheumatology for arthralgia/aches and pain and had comprehensive labs done as stated below was told that most of her symptoms are less likely related to any form of autoimmune disease. She is little concern for multiple sclerosis there is a family history of multiple sclerosis in her family. Her symptoms mainly are fatigue severe pelvis and hip pain low back pain stiffness especially early in the morning numbness in the right anterior thigh. She feels her body cracks constantly. The sensation of hot water running through her leg sometimes especially when she is on her feet for a long time. She is has general pruritus worse at night. She is losing clumps of hair / more hair shedding. She was told that she has torn labrum in her hips on the right side. Decreased reflexes  Right anterior thigh numbness  Tremors on pronator drift.     Recent labs at the [facility]    Elevated C3- complements 175-   Normal C4 44  CRP 2.8-- slightly elevated  WBC- elevated 12.47  Slightly elevated RDW 15.1  Platelet count 208  ANC 7.72 slightly elevated  Anti smith  Anti RNP  Anti SSA  Anti SSB  Anti centromere AB  Atnti Sclero  Anti JO1   Anti chromatin  HLA B27 Negative  Neglowative Subjective:      Review of Systems   Constitutional: Positive for fatigue. Negative for fever and unexpected weight change. HENT: Negative for ear pain, postnasal drip, rhinorrhea, sinus pain, sore throat and trouble swallowing. Eyes: Negative for visual disturbance. Respiratory: Negative for cough, chest tightness and shortness of breath. Cardiovascular: Negative for chest pain and leg swelling. Gastrointestinal: Negative for abdominal pain, blood in stool and diarrhea. Endocrine: Negative for polyuria. Hair loss   Genitourinary: Negative for difficulty urinating and flank pain. Musculoskeletal: Positive for arthralgias, back pain and myalgias. Negative for gait problem and joint swelling. Skin: Negative for rash. Allergic/Immunologic: Negative for environmental allergies. Neurological: Negative for weakness, light-headedness, numbness and headaches. Hematological: Negative for adenopathy. Psychiatric/Behavioral: Negative for behavioral problems and suicidal ideas. The patient is not nervous/anxious. Objective:     /82 (Site: Right Upper Arm, Position: Sitting)   Pulse 100   Wt 260 lb (117.9 kg)   SpO2 98%     Physical Exam  Vitals and nursing note reviewed. Constitutional:       Appearance: She is obese. HENT:      Head: Normocephalic and atraumatic. Neck:      Vascular: No carotid bruit. Cardiovascular:      Rate and Rhythm: Normal rate and regular rhythm. Pulses: Normal pulses. Heart sounds: Normal heart sounds. No murmur heard. No gallop. Pulmonary:      Effort: Pulmonary effort is normal. No respiratory distress. Breath sounds: Normal breath sounds. No stridor. No wheezing. Abdominal:      General: Abdomen is flat. Bowel sounds are normal.      Palpations: Abdomen is soft. Musculoskeletal:         General: No tenderness or signs of injury. Normal range of motion. Cervical back: Normal range of motion. No tenderness. Right lower leg: No edema. Left lower leg: No edema. Skin:     General: Skin is warm. Neurological:      Mental Status: She is alert and oriented to person, place, and time. Mental status is at baseline. Cranial Nerves: Cranial nerves are intact. No cranial nerve deficit or facial asymmetry. Sensory: Sensory deficit present. Motor: Pronator drift present. No weakness, tremor, atrophy, abnormal muscle tone or seizure activity. Coordination: Romberg sign positive. Coordination normal. Finger-Nose-Finger Test abnormal. Heel to X [facility] Test normal. Rapid alternating movements normal.      Gait: Gait is intact. Deep Tendon Reflexes: Babinski sign absent on the right side. Babinski sign absent on the left side. Reflex Scores:       Tricep reflexes are 1+ on the right side and 2+ on the left side. Bicep reflexes are 1+ on the right side and 2+ on the left side. Brachioradialis reflexes are 1+ on the right side and 2+ on the left side. Patellar reflexes are 1+ on the right side and 2+ on the left side. Achilles reflexes are 1+ on the right side and 2+ on the left side. Comments: Decreased reflexes  Decreased sensations right anterior thigh. Psychiatric:         Mood and Affect: Mood normal.             Assessment       Diagnosis Orders   1. Elevated erythrocyte sedimentation rate  MRI BRAIN W WO CONTRAST    MRI CERVICAL SPINE W WO CONTRAST   2. Elevated C-reactive protein (CRP)  MRI BRAIN W WO CONTRAST    MRI CERVICAL SPINE W WO CONTRAST   3. Polyarthralgia  MRI BRAIN W WO CONTRAST    MRI CERVICAL SPINE W WO CONTRAST   4. Stiffness in joint  MRI BRAIN W WO CONTRAST    MRI CERVICAL SPINE W WO CONTRAST   5. Pelvic pain  MRI BRAIN W WO CONTRAST    MRI CERVICAL SPINE W WO CONTRAST   6. Hip pain  MRI BRAIN W WO CONTRAST    MRI CERVICAL SPINE W WO CONTRAST   7. Sacroiliitis ([address]  MRI BRAIN W WO CONTRAST    MRI CERVICAL SPINE W WO CONTRAST   8.  Sacroiliac joint dysfunction of right side  MRI BRAIN W WO CONTRAST    MRI CERVICAL SPINE W WO CONTRAST   9. Spondylosis of lumbosacral region without myelopathy or radiculopathy  MRI BRAIN W WO CONTRAST    MRI CERVICAL SPINE W WO CONTRAST   10. Chronic low back pain without sciatica, unspecified back pain laterality  MRI BRAIN W WO CONTRAST    MRI CERVICAL SPINE W WO CONTRAST   11. Hair loss  MRI BRAIN W WO CONTRAST    MRI CERVICAL SPINE W WO CONTRAST   12. Spasm of muscle of lower back  MRI BRAIN W WO CONTRAST    MRI CERVICAL SPINE W WO CONTRAST   13. Generalized pruritus  MRI BRAIN W WO CONTRAST    MRI CERVICAL SPINE W WO CONTRAST   14. Numbness of right anterior thigh  MRI BRAIN W WO CONTRAST   15. Acute intractable headache, unspecified headache type  MRI BRAIN W WO CONTRAST         PLAN   MRI brain and C spine to look for demyelinating lesions. Follow up after the results. Orders Placed This Encounter   Procedures    MRI BRAIN W WO CONTRAST     Standing Status:   Future     Standing Expiration Date:   8/6/2022     Order Specific Question:   Reason for exam:     Answer:   Rule out Multiple sclerosis related dymelinating lesions. - lots of neurological symptoms.  MRI CERVICAL SPINE W WO CONTRAST     Standing Status:   Future     Standing Expiration Date:   8/6/2022     Order Specific Question:   Reason for exam:     Answer:   Rule out Multiple sclerosis related dymelinating lesions. - lots of neurological symptoms. No follow-ups on file. Patient given educational materials - see patient instructions. Discussed use, benefit, and side effects of prescribed medications. All patient questions answered. Pt voiced understanding. Reviewed health maintenance.        Electronically signed Terry Hector MD on 8/6/2021 at 8:51 AM EDT

## 2021-09-13 NOTE — PROGRESS NOTE ADULT - PROBLEM SELECTOR PLAN 7
100% of the time
DVT ppx - pt on Xarelto
NPO and on lasix   c/w IV with Potassium   monitor BMP
NPO and on lasix   c/w IV with Potassium   monitor BMP
Xarelto restarted.
c/w xarelto for PE
c/w xarelto for PE
IV with Potassium REPLACED  monitor BMP
IV with Potassium REPLACED  monitor BMP
increased ascites along with severe hypoalbuminemia.   c/w IV lasix 20 mg once daily.
DVT ppx - pt on Xarelto

## 2021-10-08 NOTE — PROCEDURE NOTE - NSINFORMCONSENT_GEN_A_CORE
Benefits, risks, and possible complications of procedure explained to patient/caregiver who verbalized understanding and gave written consent.
Benefits, risks, and possible complications of procedure explained to patient/caregiver who verbalized understanding and gave verbal consent.
daughter consent
08-Oct-2021 10:45

## 2021-12-15 NOTE — PROGRESS NOTE ADULT - PROBLEM SELECTOR PLAN 6
unchanged Multifactorial: Multiple organ failure, Elevated LFTS, Elevated amnionia level and baseline dementia.  Maintain fall and safety precautions.

## 2022-01-14 NOTE — PROGRESS NOTE ADULT - PROBLEM SELECTOR PLAN 8
Probably secondary to portal hypertension.   Continue rifaximin  Hepatology following. Cheek-To-Nose Interpolation Flap Text: A decision was made to reconstruct the defect utilizing an interpolation axial flap and a staged reconstruction.  A telfa template was made of the defect.  This telfa template was then used to outline the Cheek-To-Nose Interpolation flap.  The donor area for the pedicle flap was then injected with anesthesia.  The flap was excised through the skin and subcutaneous tissue down to the layer of the underlying musculature.  The interpolation flap was carefully excised within this deep plane to maintain its blood supply.  The edges of the donor site were undermined.   The donor site was closed in a primary fashion.  The pedicle was then rotated into position and sutured.  Once the tube was sutured into place, adequate blood supply was confirmed with blanching and refill.  The pedicle was then wrapped with xeroform gauze and dressed appropriately with a telfa and gauze bandage to ensure continued blood supply and protect the attached pedicle.

## 2022-01-24 NOTE — PROGRESS NOTE ADULT - PROBLEM SELECTOR PROBLEM 2
Physical Therapy  Co-Evaluation and co-treatment with OT    Patient Name:  Selma Lux   MRN:  [id number]    Recommendations:     Discharge Recommendations:  rehabilitation facility (return to rehab)   Discharge Equipment Recommendations:  (will determine DME needs closer to discharge)   Barriers to discharge: Decreased caregiver support    Assessment:     Selma Lux is a 85 y.o. female admitted with a medical diagnosis of acute respiratory failure due to Covid.  She presents with the following impairments/functional limitations:  weakness,impaired endurance,impaired functional mobilty,gait instability,impaired balance,decreased safety awareness,decreased lower extremity function,decreased coordination,impaired cognition pt tolerated treatment well but is at a low functional level. pt had significant increased respiratory rate and increased anxiety during treatment. This also decreased functional mobility.  Pt will benefit from skilled PT 3x/wk to progress physically. Pt should be able to return to inpt rehab when medically stable to maximize rehab potential. Pt came to ED from [facility] with c/o SOB and productive cough. Pt went to rehab s/p SDH and PCA cardiac arrest.     Rehab Prognosis: Fair; patient would benefit from acute skilled PT services to address these deficits and reach maximum level of function.    Recent Surgery: * No surgery found *      Plan:     During this hospitalization, patient to be seen 3 x/week to address the identified rehab impairments via gait training,therapeutic activities,neuromuscular re-education and progress toward the following goals:    · Plan of Care Expires:  02/22/22    Subjective     Chief Complaint: pt stated that she did not want to fall.   Patient/Family Comments/goals: to do more for myself.   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   Prior to admission, patients level of function was modified Independent using RW.  Equipment used at home: walker, rolling,cane, straight.  DME owned (not currently used): none.  Upon discharge, patient will have assistance from daughters.    Objective:     Communicated with nurse prior to session.  Patient found supine with telemetry,pulse ox (continuous),oxygen,parikh catheter,bed alarm,peripheral IV (B Karthik Merna boots)  upon PT entry to room.    General Precautions: Standard, airborne,contact,droplet,fall   Orthopedic Precautions:    Braces:    Respiratory Status: Nasal cannula, flow 4 L/min    Exams:  · Cognitive Exam:  Patient is oriented to Person, Place and Time  · RLE ROM: WFL  · RLE Strength: at lease 3/5 not formally tested.  · LLE ROM: WFL  · LLE Strength: at least 3/5 not formally tested.     Functional Mobility:  · Bed Mobility:  Pt needed verbal cues for hand placement and sequencing for functional mobility.   · Rolling Right: total assistance and of 2 persons  · Supine to Sit: total assistance and of 2 persons  ·   · Balance: pt sat on EOB x 5 min with total assist. pt leaned backwards with sitting and needed verbal cues to hold head upright with sitting.     Pt O2 sats 98% prior to treatment, decreased to 87% during treatment and returned to 99% after treatment with pt supine in bed. Pt had significant increased respiratory rate with treatment and needed verbal and visual cues to slow breathing rate.     Therapeutic Activities and Exercises:   pt received verbal instructions in role of PT and POC. Pt verbally expressed understanding of such.     AM-PAC 6 CLICK MOBILITY  Total Score:9     Patient left supine with all lines intact, call button in reach and RN notified.    GOALS:   Multidisciplinary Problems     Physical Therapy Goals        Problem: Physical Therapy Goal    Goal  Priority Disciplines Outcome Goal Variances Interventions   Physical Therapy Goal     PT, PT/OT Ongoing, Progressing     Description: Goals to be met by: 22    Patient will increase functional independence with mobility by performin. Supine to sit with MInimal Assistance -not met  2. Sit to stand transfer with Minimal Assistance with RW - not met  3. Bed to chair transfer with Minimal Assistance using Rolling Walker -not met  4. Gait  x 20 feet with Moderate Assistance using Rolling Walker. -not met  5. Ascend/descend 5 stair with right Handrails Moderate Assistance -not met  6. Sitting at edge of bed x10 minutes with Contact Guard Assistance to perform activities and increase endurance - not met                     History:     Past Medical History:   Diagnosis Date    Acute cystitis without hematuria 2020    Acute hypoxemic respiratory failure 2018    Acute respiratory failure with hypoxia 3/2/2020    Altered mental status     Anemia     Arthritis     Bilateral hand pain 3/14/2018    Branch retinal vein occlusion, left eye 2015    Chronic bilateral low back pain without sciatica 3/23/2017    Chronic renal failure in pediatric patient, stage 3 (moderate) 4/15/2018    Cognitive communication deficit 2017    Cystoid macular edema, left eye 2015    Cystoid macular edema, left eye 2015    DJD (degenerative joint disease) of cervical spine 5/15/2013    Fatty liver 2014    Goiter 2018    Hashimoto's disease     Hemichorea 2017    Hypertension     Hypertensive encephalopathy     IBS (irritable bowel syndrome) 2017    IGT (impaired glucose tolerance) 2016    Iron deficiency anemia secondary to inadequate dietary iron intake 2013    Joint pain     Keratoconjunctivitis sicca of both eyes not specified as Sjogren's 2016    Leiomyoma of uterus, unspecified 2014    Long QT interval 2016    Due to medication  (plaquenil)     Macular edema 1/12/2015    Multinodular goiter 1/12/2016    Neuropathy 8/23/2017    Plaquenil causing adverse effect in therapeutic use 10/7/2016    Pneumococcal vaccine refused 8/17/2016    Pneumonia due to Streptococcus pneumoniae 4/5/2018    Primary osteoarthritis involving multiple joints 10/21/2015    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    s/p Right Total knee replacement 5/15/2013    Scoliosis of thoracic spine 5/15/2013    Small vessel disease, cerebrovascular 12/28/2017    Stroke     UTI (urinary tract infection) 12/29/2018    Vascular dementia 12/6/2017       Past Surgical History:   Procedure Laterality Date    BREAST CYST EXCISION      CATARACT EXTRACTION      COLONOSCOPY N/A 9/29/2015    Procedure: COLONOSCOPY;  Surgeon: FIDELINA Sanchez MD;  Location: [facility] ENDO ([address] [place] [address]);  Service: Endoscopy;  Laterality: N/A;    EYE SURGERY      INJECTION OF ANESTHETIC AGENT AROUND NERVE Left 4/19/2021    Procedure: BLOCK, NERVE, FEMORAL AND OBTURATOR;  Surgeon: Shivam Gonzalez MD;  [facility]: [facility];  Service: Pain Management;  Laterality: Left;  consent needed    JOINT REPLACEMENT      right knee    KNEE SURGERY Left 12/31/2013    TKR    left parotidectomy      mixed tumor    ME EVAL,SWALLOW FUNCTION,CINE/VIDEO RECORD  6/5/2021         SALIVARY GLAND SURGERY      TONSILLECTOMY      UPPER GASTROINTESTINAL ENDOSCOPY         Time Tracking:     PT Received On: 01/24/22  PT Start Time: 0836     PT Stop Time: 0858  PT Total Time (min): 22 min     Billable Minutes: Evaluation 10 min and Therapeutic Activity 12 min      01/24/2022   Multi-organ failure with heart failure

## 2022-03-31 NOTE — H&P ADULT - NSICDXPASTSURGICALHX_GEN_ALL_CORE_FT
. Ongoing SW/CM Assessment/Plan of Care Note     See SW/CM flowsheets for goals and other objective data.    Progress note:  MD order for hospice. SW also spoke with Palliative Care NP and  She spoke with SDM via telephone. SW left message for SDM/son-Hira [phone] to discuss hospice providers.  4:03 PM  SW left another message for pts sdm/swathi- Hira to discuss hospice providers.    Current Status  Physical Therapy:   Occupational Therapy:   Nutrition/SLP - Recommendations:    Current Mental Status:    Stressors:      Barriers to discharge:   Medical clearance & hospice arrangments    Discharge plan:  Home with hospice    CM/SW team to continue to follow for discharge needs.       PAST SURGICAL HISTORY:  History of intestinal surgery

## 2022-04-26 NOTE — ED PROVIDER NOTE - CROS ED GI ALL NEG
Ongoing SW/CM Assessment/Plan of Care Note     Progress note:   CINDY nolasco rec'd. Per Tika/Bon liaison, pt was skilled, directed SW to main building no answer. SW spoke with POA/sister, unsure of skilled need, reported pt was at [facility]. [facility]. PS to MD to put PT/OT on for evaluation.          - - -

## 2022-07-16 NOTE — PROGRESS NOTE ADULT - PROVIDER SPECIALTY LIST ADULT
Transition Record Discharge Summary    Patient: Duc Elmore   MRN: [id number]  : 1996       Discharging Diagnosis: Principal Problem:    Schizophrenia (CMS/[facility])      Justification for Multiple Antipsychotics at Discharge: (Select answer and if applicable type medications)  Not Applicable as patient is receiving zero or one antipsychotic medications at discharge    Was a Prescription for an FDA- approved tobacco cessation medication given to the patient at discharge?   No, not applicable. Patient has not used tobacco within the past 30 days prior to the day of hospital admission or the patient smokes 4 or less cigarettes daily.    Was a prescription for an FDA-approved alcohol or drug disorder medication was given to the patient at discharge?    No, not applicable since the patient does not have a diagnosis of alcohol or drug disorder and does not have unhealthy alcohol use    Refusal of Lipid Panel  Patient did not complete lipid panel due to Enduring unstable psychological condition    
Surgery
Internal Medicine
MICU

## 2022-08-29 NOTE — PROGRESS NOTE ADULT - PROBLEM SELECTOR PLAN 8
Full code   next of kin daughter Jus Alert and oriented to person, place and time/Patient baseline mental status/Awake MD clarissa Bernal is aware of the vitals. As per MD, pt is stable to be discharged./Alert and oriented to person, place and time/Patient baseline mental status/Awake

## 2022-09-20 NOTE — PATIENT PROFILE ADULT - OVER THE PAST TWO WEEKS, HAVE YOU FELT LITTLE INTEREST OR PLEASURE IN DOING THINGS?
Take a boosted dose of 1.5 tablets (7.5mg total) today Tuesday (9/20) only. Then starting tomorrow, follow new dosing instructions on the calendar.   
no

## 2022-10-05 NOTE — PROGRESS NOTE ADULT - PROBLEM SELECTOR PLAN 2
Continue midodrine 10mg every 8 hours.  Not able to wean of midodrine at this time Cryotherapy Text: The wound bed was treated with cryotherapy after the biopsy was performed.

## 2022-10-29 NOTE — PROGRESS NOTE ADULT - PROBLEM SELECTOR PLAN 9
Attending Attestation (For Attendings USE Only)... DVT and GI prophylaxis  Continue rifaximin  Needs antibiotics thru 12/28. Dapto thru 12/14. Linezolid 12/14-12/28  OOB daily. Continue PT.   Medically stable for discharge

## 2022-12-09 NOTE — PROGRESS NOTE ADULT - PROBLEM SELECTOR PLAN 9
Dr. Campbell's Healthy Eyes tips, 2022 version:    Have routine eye exams, to keep you seeing well & monitor your eye health.    If your eyes are red, sore, or your vision is blurry, & it's worsening or doesn't clear up on its own within a few days, come in for evaluation.  Some eye conditions like glaucoma may have no symptoms until they are quite advanced.      Protect your abril eyes!  Wear safety glasses when doing ANY hazardous activities, including sawing, grinding, lawn mowing, or working with chemicals.  If you ever DO get a foreign object or chemical in the eye, immediately flush it with sterile saline (preferably) or water and seek medical treatment immediately, especially in the case of a chemical splash or metal foreign object.  The longer a piece of metal remains, the more likely it is to rust in the eye.  Then we need to use a tiny drill to remove the rusted tissue.  You don't want to need that.    Wear sunglasses, even on cloudy days, to help reduce the risk of future cataracts, macular degeneration, & skin cancer around the eyes.  It's like sunscreen for the eyes... and a good practice for adults & kids alike.  And... they look cool, an added bonus.    Contact lens wearers:  Your eyes should always LOOK good (white, not red), FEEL good (comfortable, not irritating... you should be able to forget they are even in), and your vision should be CLEAR.  Do not continue to wear lenses that are causing eye redness, discomfort or blurry vision.  Replace your lenses on schedule & don't sleep with them in unless you were told this was okay.  Replace cases routinely, and leave them open to air dry in the morning to help prevent bacterial growth in the case.  Also - have a backup pair of glasses you can see with, even if you don't wear them much.  If you develop an eye infection, or have trauma to the eye, you may have to give up your contact lens wear for a period of time.  It's also important to have  glasses so that you can \"give your eyes a break\" from your contact lenses at the end of the day.  It's hard to do that if you don't have glasses to use.    Computer users:  Position the screen so that your eyes are looking down about 20-30 degrees from the horizontal.  Take frequent breaks.  Use the 20-20-20 rule:  Take a 20-second break every 20 minutes and close your eyes or look at something at least 20 feet away.  If you spend a lot of time on the computer, consider getting specially made computer glasses.  Your blink rate tends to go down and your eyes might get dry during screen time so consider using a lubricant eyedrop (also called artificial tears) as needed.    To help maintain a healthy tear film and slow the development of cataracts and macular degeneration, consider:   -Omega 3 (fish or flaxseed oil) supplements   -a multivitamin containing vitamin E, zinc, lutein, & zeaxanthin   -increasing consumption of leafy greens.  Mom was right:  Eat your spinach!  Kale too!   -don't smoke!  You know it causes lung cancer, but it also increases the risk of these eye problems.  Don't do it!  If you smoke now, we have smoking cessation materials to give you.    They say the eyes are the window to the soul... yep, we see right in there... and we can read your mind.  Nope!  Just kidding on that one.  :)    If you have any concerns regarding your eyes, vision, glasses or contact lenses, please call me at the [facility], [phone].        Chacha Campbell, OD    DVT and GI prophylaxis  Continue rifaximin  Dapto will be extended to total of 6 weeks.   PICC in IR likely on Friday. Hold Lovenox on Friday.   Discharge planning likely to [facility] when medically optimized

## 2022-12-14 NOTE — DISCHARGE NOTE ADULT - CARE PROVIDER_API CALL
Flaquita Wei(Attending) Zac Cottrell (MD), Surgery  [address] [place] [address]  Phone: [phone]  Fax: [phone]

## 2023-01-03 NOTE — CONSULT NOTE ADULT - SUBJECTIVE AND OBJECTIVE BOX
HPI:  64 year old female from home walks with cane, with PMH of recurrent SBO's, s/p exp lap, SB resection in 2015, ex lap, ALBINA in 2018, DVT, PE, on Xarelto, IVC filter, chronic leg swelling, and anasarca, came in to the ED after being sent by PCP, Dr. Ross for generalized weakness and increased in abdominal girth for last few days. Patient reports the abdominal girth is bothering her and she has noticed increased swelling in her belly and her legs.   Patient denies any any chest pain, palpitations abdominal pain, nausea, vomiting, change in urinary or bowel habits.  Patient is being admitted with anasarca with increased asci ties along with severe hypoalbuminemia. 1 dose of albumin given. Will start on IV lasix 20 mg once daily. CT abdomen showed colitis and concern for SBO. Will start on IV cipro and IV flagyl. NPO for now. Surgery evaluation in am. GI consult Dr Felix. (18 May 2020 23:33)      History as above.  No surgery for now. Patient remains NPO. Likely for paracentesis tomorrow.  Patient on Ciprofloxacin.  CT scan showed multisegmental wall thickening of small and large bowel with small bowel anastomosis with enterocolitis and large ascities and b/l pleural effusions. Patient has remained afebrile.     PAST MEDICAL & SURGICAL HISTORY:  Anasarca  Pulmonary embolism  DVT (deep venous thrombosis)  SBO (small bowel obstruction)  H/O exploratory laparotomy      No Known Allergies      Meds:  furosemide   Injectable 20 milliGRAM(s) IV Push daily  pantoprazole    Tablet 40 milliGRAM(s) Oral before breakfast      SOCIAL HISTORY:  Smoker:  NO   ETOH use:  NO   Ilicit Drug use:  NO  Assisted device use: Cane   Live with: Home    FAMILY HISTORY:  No pertinent family history in first degree relatives      VITALS:  Vital Signs Last 24 Hrs  T(C): 36.6 (20 May 2020 05:03), Max: 36.7 (19 May 2020 14:58)  T(F): 97.8 (20 May 2020 05:03), Max: 98.1 (19 May 2020 14:58)  HR: 83 (20 May 2020 05:03) (83 - 91)  BP: 94/66 (20 May 2020 05:03) (90/63 - 94/66)  BP(mean): --  RR: 17 (20 May 2020 05:03) (17 - 18)  SpO2: 96% (20 May 2020 05:03) (96% - 98%)    LABS/DIAGNOSTIC TESTS:                          8.1    5.40  )-----------( 216      ( 20 May 2020 06:56 )             25.7     WBC Count: 5.40 K/uL ( @ 06:56)  WBC Count: 7.25 K/uL ( @ 05:55)  WBC Count: 9.62 K/uL ( @ 20:47)          141  |  108  |  10  ----------------------------<  79  3.4<L>   |  25  |  0.60    Ca    5.9<LL>      20 May 2020 06:56  Phos  1.4       Mg     1.7         TPro  3.5<L>  /  Alb  1.0<L>  /  TBili  1.3<H>  /  DBili  0.8<H>  /  AST  50<H>  /  ALT  61<H>  /  AlkPhos  132<H>        Urinalysis Basic - ( 18 May 2020 21:53 )    Color: Yellow / Appearance: Clear / S.010 / pH: x  Gluc: x / Ketone: Negative  / Bili: Negative / Urobili: Negative   Blood: x / Protein: Negative / Nitrite: Negative   Leuk Esterase: Small / RBC: 0-2 /HPF / WBC 6-10 /HPF   Sq Epi: x / Non Sq Epi: Few /HPF / Bacteria: Few /HPF        LIVER FUNCTIONS - ( 20 May 2020 06:56 )  Alb: 1.0 g/dL / Pro: 3.5 g/dL / ALK PHOS: 132 U/L / ALT: 61 U/L DA / AST: 50 U/L / GGT: x             PT/INR - ( 20 May 2020 06:57 )   PT: 43.0 sec;   INR: 3.66 ratio         PTT - ( 20 May 2020 06:57 )  PTT:47.9 sec    LACTATE:    ABG -     CULTURES:   .Urine Clean Catch (Midstream)   @ 03:21   >=3 organisms. Probable collection contamination.  --  --          RADIOLOGY:  EXAM:  CT ABDOMEN AND PELVIS IC                        PROCEDURE DATE:  2020    INTERPRETATION:  CT ABDOMEN AND PELVIS WITH CONTRAST    INDICATION: Abdominal swelling. Increased abdominal girth.    TECHNIQUE: Contrast enhanced CT ofthe abdomen and pelvis. Images are reformatted in the sagittal and coronal planes.    90 mL of Omnipaque 350 contrast material was injected IV.    COMPARISON: CT abdomen pelvis 3/10/2020.    FINDINGS:    Lower Thorax: Small bilateral pleural effusionwith adjacent bibasilar opacities, likely represent dependent atelectasis. Recommend clinical correlation to assess for superimposed infection.    Liver: Steatosis.  Biliary: No significant dilatation. Cholelithiasis.  Spleen: No suspicious lesions.  Pancreas: No inflammatory changes or ductal dilatation.  Adrenals: Normal.  Kidneys: No hydronephrosis. 5 mm right renal angiomyolipoma.  Vessels: Mild atherosclerotic disease of the aorta and its branches. Celiac, SMA and KINA axes appears patent. Stable appearance of IVC filter.    GI tract: There is multisegmental wall thickening of small and large bowel loops with hyperemia, most pronounced at the level of jejunal ileal folds, concerning for enterocolitis, likely infectious or inflammatory nature. Small bowel anastomosis revisualized in the anterior mid abdomen with focal dilatation of bowel loops to 4.3 cm. Additional focally dilated fecalized jejunal loop measuring up to 4 cm in the left side of abdomen with suggestion of transition point in the left upper abdomen near surgical anastomosis images 62 through 66 of series 2 raising possibility of ileus or at least partial small bowel obstruction.    Peritoneum/retroperitoneum and mesentery: Large ascites, increased compared to the prior study. No free air. No organized fluid collection. No bulky adenopathy.    Pelvic organs/Bladder: No pelvic masses. Bladder is normal.    Abdominal wall: Diffuse anasarca.  Bones and soft tissues: Multilevel degenerative changes of the spine are noted with patchy osteopenia, limiting detailed evaluation.     IMPRESSION:    Multisegmental wall thickening of small and large bowel loops with hyperemia, most pronounced at the level of jejunal ileal folds, concerning for enterocolitis, likely infectious or inflammatory nature.     Small bowel anastomosis revisualized in the anterior mid abdomen with focal dilatation of bowel loops to 4.3 cm. Additional focally dilated fecalized jejunal loop measuring up to 4 cm in the left side of abdomen with suggestion of transition point in the left upper abdomen near surgical anastomosis, raising possibility of ileus or at least partial small bowel obstruction. No pneumatosis or findings of portal venous gas to suggest bowel ischemia. Correlate with lactic acid.    Large ascites, increased compared to the prior study.    Cholelithiasis.    Small bilateral pleural effusion with adjacent bibasilar opacities, likely represent dependent atelectasis. Recommend clinical correlation to assess for superimposed infection.    EVGENY PANDA M.D., ATTENDING RADIOLOGIST  This document has been electronically signed. May 19 2020  1:29AM    ------------------------------------------------------------------------------------------------------------------------------------------------------------------------------------------------------------------------------------------------------  ------------------------------------------------------------------------------------------------------------------------------------------------------------------------------------------------------------------------------------------------------    EXAM:  XR CHEST PORTABLE IMMED 1V                            PROCEDURE DATE:  2020          INTERPRETATION:    Examination: XR CHEST IMMEDIATE    History: ADMDIAG1: R53.1 WEAKNESS/, admission covid r/o    Comparison: 2020    Findings:  Normal heart size. Small left base infiltrate or effusion or combination of both. No pneumothorax or right effusion.    Impression:  1.  Small left infiltrate/effusion.      DISCRETE X-RAY DATA:  Percent of LEFT lung opacification: 1-33%  Percent of RIGHT lung opacification: Clear  Change in lung opacification from most recent x-ray (<=3 days): No Prior  Change from prior dated 3 or more days (same admission): No Prior    BERONICA ROSAS M.D., ATTENDING RADIOLOGIST  This document hasbeen electronically signed. May 19 2020  7:19AM              ABNER  [  ] UNABLE TO ELICIT none HPI:  64 year old female from home walks with cane, with PMH of recurrent SBO's, s/p exp lap, SB resection in 2015, ex lap, ALBINA in 2018, DVT, PE, on Xarelto, IVC filter, chronic leg swelling, and anasarca, came in to the ED after being sent by PCP, Dr. Ross for generalized weakness and increased in abdominal girth for last few days. Patient reports the abdominal girth is bothering her and she has noticed increased swelling in her belly and her legs.   Patient denies any any chest pain, palpitations abdominal pain, nausea, vomiting, change in urinary or bowel habits.  Patient is being admitted with anasarca with increased asci ties along with severe hypoalbuminemia. 1 dose of albumin given. Will start on IV lasix 20 mg once daily. CT abdomen showed colitis and concern for SBO. Will start on IV cipro and IV flagyl. NPO for now. Surgery evaluation in am. GI consult Dr Felix. (18 May 2020 23:33)      History as above. Patient was admitted for anasarca. Likely for paracentesis tomorrow with IR. Patient NPO.    Patient on Ciprofloxacin.  CT scan showed multisegmental wall thickening of small and large bowel with small bowel anastomosis with enterocolitis and large ascities and b/l pleural effusions. Patient has remained afebrile.     PAST MEDICAL & SURGICAL HISTORY:  Anasarca  Pulmonary embolism  DVT (deep venous thrombosis)  SBO (small bowel obstruction)  H/O exploratory laparotomy      No Known Allergies      Meds:  furosemide   Injectable 20 milliGRAM(s) IV Push daily  pantoprazole    Tablet 40 milliGRAM(s) Oral before breakfast      SOCIAL HISTORY:  Smoker:  NO   ETOH use:  NO   Ilicit Drug use:  NO  Assisted device use: Cane   Live with: Home    FAMILY HISTORY:  No pertinent family history in first degree relatives      VITALS:  Vital Signs Last 24 Hrs  T(C): 36.6 (20 May 2020 05:03), Max: 36.7 (19 May 2020 14:58)  T(F): 97.8 (20 May 2020 05:03), Max: 98.1 (19 May 2020 14:58)  HR: 83 (20 May 2020 05:03) (83 - 91)  BP: 94/66 (20 May 2020 05:03) (90/63 - 94/66)  BP(mean): --  RR: 17 (20 May 2020 05:03) (17 - 18)  SpO2: 96% (20 May 2020 05:03) (96% - 98%)    LABS/DIAGNOSTIC TESTS:                          8.1    5.40  )-----------( 216      ( 20 May 2020 06:56 )             25.7     WBC Count: 5.40 K/uL ( @ 06:56)  WBC Count: 7.25 K/uL ( @ 05:55)  WBC Count: 9.62 K/uL ( @ 20:47)          141  |  108  |  10  ----------------------------<  79  3.4<L>   |  25  |  0.60    Ca    5.9<LL>      20 May 2020 06:56  Phos  1.4       Mg     1.7         TPro  3.5<L>  /  Alb  1.0<L>  /  TBili  1.3<H>  /  DBili  0.8<H>  /  AST  50<H>  /  ALT  61<H>  /  AlkPhos  132<H>  20      Urinalysis Basic - ( 18 May 2020 21:53 )    Color: Yellow / Appearance: Clear / S.010 / pH: x  Gluc: x / Ketone: Negative  / Bili: Negative / Urobili: Negative   Blood: x / Protein: Negative / Nitrite: Negative   Leuk Esterase: Small / RBC: 0-2 /HPF / WBC 6-10 /HPF   Sq Epi: x / Non Sq Epi: Few /HPF / Bacteria: Few /HPF        LIVER FUNCTIONS - ( 20 May 2020 06:56 )  Alb: 1.0 g/dL / Pro: 3.5 g/dL / ALK PHOS: 132 U/L / ALT: 61 U/L DA / AST: 50 U/L / GGT: x             PT/INR - ( 20 May 2020 06:57 )   PT: 43.0 sec;   INR: 3.66 ratio         PTT - ( 20 May 2020 06:57 )  PTT:47.9 sec    LACTATE:    ABG -     CULTURES:   .Urine Clean Catch (Midstream)   @ 03:21   >=3 organisms. Probable collection contamination.  --  --          RADIOLOGY:  EXAM:  CT ABDOMEN AND PELVIS IC                        PROCEDURE DATE:  2020    INTERPRETATION:  CT ABDOMEN AND PELVIS WITH CONTRAST    INDICATION: Abdominal swelling. Increased abdominal girth.    TECHNIQUE: Contrast enhanced CT ofthe abdomen and pelvis. Images are reformatted in the sagittal and coronal planes.    90 mL of Omnipaque 350 contrast material was injected IV.    COMPARISON: CT abdomen pelvis 3/10/2020.    FINDINGS:    Lower Thorax: Small bilateral pleural effusionwith adjacent bibasilar opacities, likely represent dependent atelectasis. Recommend clinical correlation to assess for superimposed infection.    Liver: Steatosis.  Biliary: No significant dilatation. Cholelithiasis.  Spleen: No suspicious lesions.  Pancreas: No inflammatory changes or ductal dilatation.  Adrenals: Normal.  Kidneys: No hydronephrosis. 5 mm right renal angiomyolipoma.  Vessels: Mild atherosclerotic disease of the aorta and its branches. Celiac, SMA and KINA axes appears patent. Stable appearance of IVC filter.    GI tract: There is multisegmental wall thickening of small and large bowel loops with hyperemia, most pronounced at the level of jejunal ileal folds, concerning for enterocolitis, likely infectious or inflammatory nature. Small bowel anastomosis revisualized in the anterior mid abdomen with focal dilatation of bowel loops to 4.3 cm. Additional focally dilated fecalized jejunal loop measuring up to 4 cm in the left side of abdomen with suggestion of transition point in the left upper abdomen near surgical anastomosis images 62 through 66 of series 2 raising possibility of ileus or at least partial small bowel obstruction.    Peritoneum/retroperitoneum and mesentery: Large ascites, increased compared to the prior study. No free air. No organized fluid collection. No bulky adenopathy.    Pelvic organs/Bladder: No pelvic masses. Bladder is normal.    Abdominal wall: Diffuse anasarca.  Bones and soft tissues: Multilevel degenerative changes of the spine are noted with patchy osteopenia, limiting detailed evaluation.     IMPRESSION:    Multisegmental wall thickening of small and large bowel loops with hyperemia, most pronounced at the level of jejunal ileal folds, concerning for enterocolitis, likely infectious or inflammatory nature.     Small bowel anastomosis revisualized in the anterior mid abdomen with focal dilatation of bowel loops to 4.3 cm. Additional focally dilated fecalized jejunal loop measuring up to 4 cm in the left side of abdomen with suggestion of transition point in the left upper abdomen near surgical anastomosis, raising possibility of ileus or at least partial small bowel obstruction. No pneumatosis or findings of portal venous gas to suggest bowel ischemia. Correlate with lactic acid.    Large ascites, increased compared to the prior study.    Cholelithiasis.    Small bilateral pleural effusion with adjacent bibasilar opacities, likely represent dependent atelectasis. Recommend clinical correlation to assess for superimposed infection.    EVGENY PANDA M.D., ATTENDING RADIOLOGIST  This document has been electronically signed. May 19 2020  1:29AM    ------------------------------------------------------------------------------------------------------------------------------------------------------------------------------------------------------------------------------------------------------  ------------------------------------------------------------------------------------------------------------------------------------------------------------------------------------------------------------------------------------------------------    EXAM:  XR CHEST PORTABLE IMMED 1V                            PROCEDURE DATE:  2020          INTERPRETATION:    Examination: XR CHEST IMMEDIATE    History: ADMDIAG1: R53.1 WEAKNESS/, admission covid r/o    Comparison: 2020    Findings:  Normal heart size. Small left base infiltrate or effusion or combination of both. No pneumothorax or right effusion.    Impression:  1.  Small left infiltrate/effusion.      DISCRETE X-RAY DATA:  Percent of LEFT lung opacification: 1-33%  Percent of RIGHT lung opacification: Clear  Change in lung opacification from most recent x-ray (<=3 days): No Prior  Change from prior dated 3 or more days (same admission): No Prior    BERONICA ROSAS M.D., ATTENDING RADIOLOGIST  This document hasbeen electronically signed. May 19 2020  7:19AM              ROS  [  ] UNABLE TO ELICIT HPI:  64 year old female from home walks with cane, with PMH of recurrent SBO's, s/p exp lap, SB resection in 2015, ex lap, ALBINA in 2018, DVT, PE, on Xarelto, IVC filter, chronic leg swelling, and anasarca, came in to the ED after being sent by PCP, Dr. Ross for generalized weakness and increased in abdominal girth for last few days. Patient reports the abdominal girth is bothering her and she has noticed increased swelling in her belly and her legs.   Patient denies any any chest pain, palpitations abdominal pain, nausea, vomiting, change in urinary or bowel habits.  Patient is being admitted with anasarca with increased asci ties along with severe hypoalbuminemia. 1 dose of albumin given. Will start on IV lasix 20 mg once daily. CT abdomen showed colitis and concern for SBO. Will start on IV cipro and IV flagyl. NPO for now. Surgery evaluation in am. GI consult Dr Felix. (18 May 2020 23:33)      History as above. Patient was admitted for anasarca. Likely for paracentesis tomorrow with IR. Patient NPO.    Patient on Ciprofloxacin.  CT scan showed multisegmental wall thickening of small and large bowel with small bowel anastomosis with enterocolitis and large ascities and b/l pleural effusions. Patient is afebrile.   Pt was admitted with weakness and was asked to see her to r/o colitis because of her CT results. She has no diarrhea at all, no abdominal pain.    PAST MEDICAL & SURGICAL HISTORY:  Anasarca  Pulmonary embolism  DVT (deep venous thrombosis)  SBO (small bowel obstruction)  H/O exploratory laparotomy      No Known Allergies      Meds:  furosemide   Injectable 20 milliGRAM(s) IV Push daily  pantoprazole    Tablet 40 milliGRAM(s) Oral before breakfast      SOCIAL HISTORY:  Smoker:  NO   ETOH use:  NO   Ilicit Drug use:  NO  Assisted device use: Cane   Live with: Home    FAMILY HISTORY:  No pertinent family history in first degree relatives      VITALS:  Vital Signs Last 24 Hrs  T(C): 36.6 (20 May 2020 05:03), Max: 36.7 (19 May 2020 14:58)  T(F): 97.8 (20 May 2020 05:03), Max: 98.1 (19 May 2020 14:58)  HR: 83 (20 May 2020 05:03) (83 - 91)  BP: 94/66 (20 May 2020 05:03) (90/63 - 94/66)  BP(mean): --  RR: 17 (20 May 2020 05:03) (17 - 18)  SpO2: 96% (20 May 2020 05:03) (96% - 98%)    LABS/DIAGNOSTIC TESTS:                          8.1    5.40  )-----------( 216      ( 20 May 2020 06:56 )             25.7     WBC Count: 5.40 K/uL ( @ 06:56)  WBC Count: 7.25 K/uL ( @ 05:55)  WBC Count: 9.62 K/uL ( @ 20:47)          141  |  108  |  10  ----------------------------<  79  3.4<L>   |  25  |  0.60    Ca    5.9<LL>      20 May 2020 06:56  Phos  1.4       Mg     1.7         TPro  3.5<L>  /  Alb  1.0<L>  /  TBili  1.3<H>  /  DBili  0.8<H>  /  AST  50<H>  /  ALT  61<H>  /  AlkPhos  132<H>        Urinalysis Basic - ( 18 May 2020 21:53 )    Color: Yellow / Appearance: Clear / S.010 / pH: x  Gluc: x / Ketone: Negative  / Bili: Negative / Urobili: Negative   Blood: x / Protein: Negative / Nitrite: Negative   Leuk Esterase: Small / RBC: 0-2 /HPF / WBC 6-10 /HPF   Sq Epi: x / Non Sq Epi: Few /HPF / Bacteria: Few /HPF        LIVER FUNCTIONS - ( 20 May 2020 06:56 )  Alb: 1.0 g/dL / Pro: 3.5 g/dL / ALK PHOS: 132 U/L / ALT: 61 U/L DA / AST: 50 U/L / GGT: x             PT/INR - ( 20 May 2020 06:57 )   PT: 43.0 sec;   INR: 3.66 ratio         PTT - ( 20 May 2020 06:57 )  PTT:47.9 sec    LACTATE:    ABG -     CULTURES:   .Urine Clean Catch (Midstream)   @ 03:21   >=3 organisms. Probable collection contamination.  --  --          RADIOLOGY:  EXAM:  CT ABDOMEN AND PELVIS IC                        PROCEDURE DATE:  2020    INTERPRETATION:  CT ABDOMEN AND PELVIS WITH CONTRAST    INDICATION: Abdominal swelling. Increased abdominal girth.    TECHNIQUE: Contrast enhanced CT ofthe abdomen and pelvis. Images are reformatted in the sagittal and coronal planes.    90 mL of Omnipaque 350 contrast material was injected IV.    COMPARISON: CT abdomen pelvis 3/10/2020.    FINDINGS:    Lower Thorax: Small bilateral pleural effusionwith adjacent bibasilar opacities, likely represent dependent atelectasis. Recommend clinical correlation to assess for superimposed infection.    Liver: Steatosis.  Biliary: No significant dilatation. Cholelithiasis.  Spleen: No suspicious lesions.  Pancreas: No inflammatory changes or ductal dilatation.  Adrenals: Normal.  Kidneys: No hydronephrosis. 5 mm right renal angiomyolipoma.  Vessels: Mild atherosclerotic disease of the aorta and its branches. Celiac, SMA and KINA axes appears patent. Stable appearance of IVC filter.    GI tract: There is multisegmental wall thickening of small and large bowel loops with hyperemia, most pronounced at the level of jejunal ileal folds, concerning for enterocolitis, likely infectious or inflammatory nature. Small bowel anastomosis revisualized in the anterior mid abdomen with focal dilatation of bowel loops to 4.3 cm. Additional focally dilated fecalized jejunal loop measuring up to 4 cm in the left side of abdomen with suggestion of transition point in the left upper abdomen near surgical anastomosis images 62 through 66 of series 2 raising possibility of ileus or at least partial small bowel obstruction.    Peritoneum/retroperitoneum and mesentery: Large ascites, increased compared to the prior study. No free air. No organized fluid collection. No bulky adenopathy.    Pelvic organs/Bladder: No pelvic masses. Bladder is normal.    Abdominal wall: Diffuse anasarca.  Bones and soft tissues: Multilevel degenerative changes of the spine are noted with patchy osteopenia, limiting detailed evaluation.     IMPRESSION:    Multisegmental wall thickening of small and large bowel loops with hyperemia, most pronounced at the level of jejunal ileal folds, concerning for enterocolitis, likely infectious or inflammatory nature.     Small bowel anastomosis revisualized in the anterior mid abdomen with focal dilatation of bowel loops to 4.3 cm. Additional focally dilated fecalized jejunal loop measuring up to 4 cm in the left side of abdomen with suggestion of transition point in the left upper abdomen near surgical anastomosis, raising possibility of ileus or at least partial small bowel obstruction. No pneumatosis or findings of portal venous gas to suggest bowel ischemia. Correlate with lactic acid.    Large ascites, increased compared to the prior study.    Cholelithiasis.    Small bilateral pleural effusion with adjacent bibasilar opacities, likely represent dependent atelectasis. Recommend clinical correlation to assess for superimposed infection.    EVGENY PANDA M.D., ATTENDING RADIOLOGIST  This document has been electronically signed. May 19 2020  1:29AM    ------------------------------------------------------------------------------------------------------------------------------------------------------------------------------------------------------------------------------------------------------  ------------------------------------------------------------------------------------------------------------------------------------------------------------------------------------------------------------------------------------------------------    EXAM:  XR CHEST PORTABLE IMMED 1V                            PROCEDURE DATE:  2020          INTERPRETATION:    Examination: XR CHEST IMMEDIATE    History: ADMDIAG1: R53.1 WEAKNESS/, admission covid r/o    Comparison: 2020    Findings:  Normal heart size. Small left base infiltrate or effusion or combination of both. No pneumothorax or right effusion.    Impression:  1.  Small left infiltrate/effusion.      DISCRETE X-RAY DATA:  Percent of LEFT lung opacification: 1-33%  Percent of RIGHT lung opacification: Clear  Change in lung opacification from most recent x-ray (<=3 days): No Prior  Change from prior dated 3 or more days (same admission): No Prior    BERONICA ROSAS M.D., ATTENDING RADIOLOGIST  This document hasbeen electronically signed. May 19 2020  7:19AM              ROS  [  ] UNABLE TO ELICIT HPI:  64 year old female from home walks with cane, with PMH of recurrent SBO's, s/p exp lap, SB resection in 2015, ex lap, ALBINA in 2018, DVT, PE, on Xarelto, IVC filter, chronic leg swelling, and anasarca, came in to the ED after being sent by PCP, Dr. Ross for generalized weakness and increased in abdominal girth for last few days. Patient reports the abdominal girth is bothering her and she has noticed increased swelling in her belly and her legs.   Patient denies any any chest pain, palpitations abdominal pain, nausea, vomiting, change in urinary or bowel habits.  Patient is being admitted with anasarca with increased asci ties along with severe hypoalbuminemia. 1 dose of albumin given. Will start on IV lasix 20 mg once daily. CT abdomen showed colitis and concern for SBO. Will start on IV cipro and IV flagyl. NPO for now. Surgery evaluation in am. GI consult Dr Felix. (18 May 2020 23:33)      History as above. Patient was admitted for anasarca. Likely for paracentesis tomorrow with IR. Patient NPO.    Patient on Ciprofloxacin.  CT scan showed multisegmental wall thickening of small and large bowel with small bowel anastomosis with enterocolitis and large ascities and b/l pleural effusions. Patient is afebrile.   Pt was admitted with weakness and was asked to see her to r/o colitis because of her CT results. She has no diarrhea at all, no abdominal pain, she has no nausea or vomiting.    PAST MEDICAL & SURGICAL HISTORY:  Anasarca  Pulmonary embolism  DVT (deep venous thrombosis)  SBO (small bowel obstruction)  H/O exploratory laparotomy      No Known Allergies      Meds:  furosemide   Injectable 20 milliGRAM(s) IV Push daily  pantoprazole    Tablet 40 milliGRAM(s) Oral before breakfast      SOCIAL HISTORY:  Smoker:  NO   ETOH use:  NO   Ilicit Drug use:  NO  Assisted device use: Cane   Live with: Home    FAMILY HISTORY:  No pertinent family history in first degree relatives      VITALS:  Vital Signs Last 24 Hrs  T(C): 36.6 (20 May 2020 05:03), Max: 36.7 (19 May 2020 14:58)  T(F): 97.8 (20 May 2020 05:03), Max: 98.1 (19 May 2020 14:58)  HR: 83 (20 May 2020 05:03) (83 - 91)  BP: 94/66 (20 May 2020 05:03) (90/63 - 94/66)  BP(mean): --  RR: 17 (20 May 2020 05:03) (17 - 18)  SpO2: 96% (20 May 2020 05:03) (96% - 98%)    LABS/DIAGNOSTIC TESTS:                          8.1    5.40  )-----------( 216      ( 20 May 2020 06:56 )             25.7     WBC Count: 5.40 K/uL ( @ 06:56)  WBC Count: 7.25 K/uL ( @ 05:55)  WBC Count: 9.62 K/uL ( @ 20:47)          141  |  108  |  10  ----------------------------<  79  3.4<L>   |  25  |  0.60    Ca    5.9<LL>      20 May 2020 06:56  Phos  1.4       Mg     1.7         TPro  3.5<L>  /  Alb  1.0<L>  /  TBili  1.3<H>  /  DBili  0.8<H>  /  AST  50<H>  /  ALT  61<H>  /  AlkPhos  132<H>  20      Urinalysis Basic - ( 18 May 2020 21:53 )    Color: Yellow / Appearance: Clear / S.010 / pH: x  Gluc: x / Ketone: Negative  / Bili: Negative / Urobili: Negative   Blood: x / Protein: Negative / Nitrite: Negative   Leuk Esterase: Small / RBC: 0-2 /HPF / WBC 6-10 /HPF   Sq Epi: x / Non Sq Epi: Few /HPF / Bacteria: Few /HPF        LIVER FUNCTIONS - ( 20 May 2020 06:56 )  Alb: 1.0 g/dL / Pro: 3.5 g/dL / ALK PHOS: 132 U/L / ALT: 61 U/L DA / AST: 50 U/L / GGT: x             PT/INR - ( 20 May 2020 06:57 )   PT: 43.0 sec;   INR: 3.66 ratio         PTT - ( 20 May 2020 06:57 )  PTT:47.9 sec    LACTATE:    ABG -     CULTURES:   .Urine Clean Catch (Midstream)   @ 03:21   >=3 organisms. Probable collection contamination.  --  --          RADIOLOGY:  EXAM:  CT ABDOMEN AND PELVIS IC                        PROCEDURE DATE:  2020    INTERPRETATION:  CT ABDOMEN AND PELVIS WITH CONTRAST    INDICATION: Abdominal swelling. Increased abdominal girth.    TECHNIQUE: Contrast enhanced CT ofthe abdomen and pelvis. Images are reformatted in the sagittal and coronal planes.    90 mL of Omnipaque 350 contrast material was injected IV.    COMPARISON: CT abdomen pelvis 3/10/2020.    FINDINGS:    Lower Thorax: Small bilateral pleural effusionwith adjacent bibasilar opacities, likely represent dependent atelectasis. Recommend clinical correlation to assess for superimposed infection.    Liver: Steatosis.  Biliary: No significant dilatation. Cholelithiasis.  Spleen: No suspicious lesions.  Pancreas: No inflammatory changes or ductal dilatation.  Adrenals: Normal.  Kidneys: No hydronephrosis. 5 mm right renal angiomyolipoma.  Vessels: Mild atherosclerotic disease of the aorta and its branches. Celiac, SMA and KINA axes appears patent. Stable appearance of IVC filter.    GI tract: There is multisegmental wall thickening of small and large bowel loops with hyperemia, most pronounced at the level of jejunal ileal folds, concerning for enterocolitis, likely infectious or inflammatory nature. Small bowel anastomosis revisualized in the anterior mid abdomen with focal dilatation of bowel loops to 4.3 cm. Additional focally dilated fecalized jejunal loop measuring up to 4 cm in the left side of abdomen with suggestion of transition point in the left upper abdomen near surgical anastomosis images 62 through 66 of series 2 raising possibility of ileus or at least partial small bowel obstruction.    Peritoneum/retroperitoneum and mesentery: Large ascites, increased compared to the prior study. No free air. No organized fluid collection. No bulky adenopathy.    Pelvic organs/Bladder: No pelvic masses. Bladder is normal.    Abdominal wall: Diffuse anasarca.  Bones and soft tissues: Multilevel degenerative changes of the spine are noted with patchy osteopenia, limiting detailed evaluation.     IMPRESSION:    Multisegmental wall thickening of small and large bowel loops with hyperemia, most pronounced at the level of jejunal ileal folds, concerning for enterocolitis, likely infectious or inflammatory nature.     Small bowel anastomosis revisualized in the anterior mid abdomen with focal dilatation of bowel loops to 4.3 cm. Additional focally dilated fecalized jejunal loop measuring up to 4 cm in the left side of abdomen with suggestion of transition point in the left upper abdomen near surgical anastomosis, raising possibility of ileus or at least partial small bowel obstruction. No pneumatosis or findings of portal venous gas to suggest bowel ischemia. Correlate with lactic acid.    Large ascites, increased compared to the prior study.    Cholelithiasis.    Small bilateral pleural effusion with adjacent bibasilar opacities, likely represent dependent atelectasis. Recommend clinical correlation to assess for superimposed infection.    EVGENY PANDA M.D., ATTENDING RADIOLOGIST  This document has been electronically signed. May 19 2020  1:29AM    ------------------------------------------------------------------------------------------------------------------------------------------------------------------------------------------------------------------------------------------------------  ------------------------------------------------------------------------------------------------------------------------------------------------------------------------------------------------------------------------------------------------------    EXAM:  XR CHEST PORTABLE IMMED 1V                            PROCEDURE DATE:  2020          INTERPRETATION:    Examination: XR CHEST IMMEDIATE    History: ADMDIAG1: R53.1 WEAKNESS/, admission covid r/o    Comparison: 2020    Findings:  Normal heart size. Small left base infiltrate or effusion or combination of both. No pneumothorax or right effusion.    Impression:  1.  Small left infiltrate/effusion.      DISCRETE X-RAY DATA:  Percent of LEFT lung opacification: 1-33%  Percent of RIGHT lung opacification: Clear  Change in lung opacification from most recent x-ray (<=3 days): No Prior  Change from prior dated 3 or more days (same admission): No Prior    BERONICA ROSAS M.D., ATTENDING RADIOLOGIST  This document hasbeen electronically signed. May 19 2020  7:19AM              ABNER  [  ] UNABLE TO ELICIT HPI:  64 year old female from home walks with cane, with PMH of recurrent SBO's, s/p exp lap, SB resection in 2015, ex lap, ALBINA in 2018, DVT, PE, on Xarelto, IVC filter, chronic leg swelling, and anasarca, came in to the ED after being sent by PCP, Dr. Ross for generalized weakness and increased in abdominal girth for last few days. Patient reports the abdominal girth is bothering her and she has noticed increased swelling in her belly and her legs.   Patient denies any any chest pain, palpitations abdominal pain, nausea, vomiting, change in urinary or bowel habits.  Patient is being admitted with anasarca with increased asci ties along with severe hypoalbuminemia. 1 dose of albumin given. Will start on IV lasix 20 mg once daily. CT abdomen showed colitis and concern for SBO. Will start on IV cipro and IV flagyl. NPO for now. Surgery evaluation in am. GI consult Dr Felix. (18 May 2020 23:33)      History as above. Patient was admitted for anasarca. Likely for paracentesis tomorrow with IR. Patient NPO.    Patient on Ciprofloxacin.  CT scan showed multisegmental wall thickening of small and large bowel with small bowel anastomosis with enterocolitis and large ascities and b/l pleural effusions. Patient is afebrile.   Pt was admitted with weakness and was asked to see her to r/o colitis because of her CT results. She has no diarrhea at all, no abdominal pain, she has no nausea or vomiting. She looks cachetic and that she has lost weight but denies it, her total protein and albumin levels are very low.     PAST MEDICAL & SURGICAL HISTORY:  Anasarca  Pulmonary embolism  DVT (deep venous thrombosis)  SBO (small bowel obstruction)  H/O exploratory laparotomy      No Known Allergies      Meds:  furosemide   Injectable 20 milliGRAM(s) IV Push daily  pantoprazole    Tablet 40 milliGRAM(s) Oral before breakfast      SOCIAL HISTORY:  Smoker:  NO   ETOH use:  NO   Ilicit Drug use:  NO  Assisted device use: Cane   Live with: Home    FAMILY HISTORY:  No pertinent family history in first degree relatives      VITALS:  Vital Signs Last 24 Hrs  T(C): 36.6 (20 May 2020 05:03), Max: 36.7 (19 May 2020 14:58)  T(F): 97.8 (20 May 2020 05:03), Max: 98.1 (19 May 2020 14:58)  HR: 83 (20 May 2020 05:03) (83 - 91)  BP: 94/66 (20 May 2020 05:03) (90/63 - 94/66)  BP(mean): --  RR: 17 (20 May 2020 05:03) (17 - 18)  SpO2: 96% (20 May 2020 05:03) (96% - 98%)    LABS/DIAGNOSTIC TESTS:                          8.1    5.40  )-----------( 216      ( 20 May 2020 06:56 )             25.7     WBC Count: 5.40 K/uL ( @ 06:56)  WBC Count: 7.25 K/uL ( @ 05:55)  WBC Count: 9.62 K/uL ( @ 20:47)      0520    141  |  108  |  10  ----------------------------<  79  3.4<L>   |  25  |  0.60    Ca    5.9<LL>      20 May 2020 06:56  Phos  1.4     05-20  Mg     1.7     -20    TPro  3.5<L>  /  Alb  1.0<L>  /  TBili  1.3<H>  /  DBili  0.8<H>  /  AST  50<H>  /  ALT  61<H>  /  AlkPhos  132<H>  05-20      Urinalysis Basic - ( 18 May 2020 21:53 )    Color: Yellow / Appearance: Clear / S.010 / pH: x  Gluc: x / Ketone: Negative  / Bili: Negative / Urobili: Negative   Blood: x / Protein: Negative / Nitrite: Negative   Leuk Esterase: Small / RBC: 0-2 /HPF / WBC 6-10 /HPF   Sq Epi: x / Non Sq Epi: Few /HPF / Bacteria: Few /HPF        LIVER FUNCTIONS - ( 20 May 2020 06:56 )  Alb: 1.0 g/dL / Pro: 3.5 g/dL / ALK PHOS: 132 U/L / ALT: 61 U/L DA / AST: 50 U/L / GGT: x             PT/INR - ( 20 May 2020 06:57 )   PT: 43.0 sec;   INR: 3.66 ratio         PTT - ( 20 May 2020 06:57 )  PTT:47.9 sec    LACTATE:    ABG -     CULTURES:   .Urine Clean Catch (Midstream)   @ 03:21   >=3 organisms. Probable collection contamination.  --  --          RADIOLOGY:  EXAM:  CT ABDOMEN AND PELVIS IC                        PROCEDURE DATE:  2020    INTERPRETATION:  CT ABDOMEN AND PELVIS WITH CONTRAST    INDICATION: Abdominal swelling. Increased abdominal girth.    TECHNIQUE: Contrast enhanced CT ofthe abdomen and pelvis. Images are reformatted in the sagittal and coronal planes.    90 mL of Omnipaque 350 contrast material was injected IV.    COMPARISON: CT abdomen pelvis 3/10/2020.    FINDINGS:    Lower Thorax: Small bilateral pleural effusionwith adjacent bibasilar opacities, likely represent dependent atelectasis. Recommend clinical correlation to assess for superimposed infection.    Liver: Steatosis.  Biliary: No significant dilatation. Cholelithiasis.  Spleen: No suspicious lesions.  Pancreas: No inflammatory changes or ductal dilatation.  Adrenals: Normal.  Kidneys: No hydronephrosis. 5 mm right renal angiomyolipoma.  Vessels: Mild atherosclerotic disease of the aorta and its branches. Celiac, SMA and KINA axes appears patent. Stable appearance of IVC filter.    GI tract: There is multisegmental wall thickening of small and large bowel loops with hyperemia, most pronounced at the level of jejunal ileal folds, concerning for enterocolitis, likely infectious or inflammatory nature. Small bowel anastomosis revisualized in the anterior mid abdomen with focal dilatation of bowel loops to 4.3 cm. Additional focally dilated fecalized jejunal loop measuring up to 4 cm in the left side of abdomen with suggestion of transition point in the left upper abdomen near surgical anastomosis images 62 through 66 of series 2 raising possibility of ileus or at least partial small bowel obstruction.    Peritoneum/retroperitoneum and mesentery: Large ascites, increased compared to the prior study. No free air. No organized fluid collection. No bulky adenopathy.    Pelvic organs/Bladder: No pelvic masses. Bladder is normal.    Abdominal wall: Diffuse anasarca.  Bones and soft tissues: Multilevel degenerative changes of the spine are noted with patchy osteopenia, limiting detailed evaluation.     IMPRESSION:    Multisegmental wall thickening of small and large bowel loops with hyperemia, most pronounced at the level of jejunal ileal folds, concerning for enterocolitis, likely infectious or inflammatory nature.     Small bowel anastomosis revisualized in the anterior mid abdomen with focal dilatation of bowel loops to 4.3 cm. Additional focally dilated fecalized jejunal loop measuring up to 4 cm in the left side of abdomen with suggestion of transition point in the left upper abdomen near surgical anastomosis, raising possibility of ileus or at least partial small bowel obstruction. No pneumatosis or findings of portal venous gas to suggest bowel ischemia. Correlate with lactic acid.    Large ascites, increased compared to the prior study.    Cholelithiasis.    Small bilateral pleural effusion with adjacent bibasilar opacities, likely represent dependent atelectasis. Recommend clinical correlation to assess for superimposed infection.    EVGENY PANDA M.D., ATTENDING RADIOLOGIST  This document has been electronically signed. May 19 2020  1:29AM    ------------------------------------------------------------------------------------------------------------------------------------------------------------------------------------------------------------------------------------------------------  ------------------------------------------------------------------------------------------------------------------------------------------------------------------------------------------------------------------------------------------------------    EXAM:  XR CHEST PORTABLE IMMED 1V                            PROCEDURE DATE:  2020          INTERPRETATION:    Examination: XR CHEST IMMEDIATE    History: ADMDIAG1: R53.1 WEAKNESS/, admission covid r/o    Comparison: 2020    Findings:  Normal heart size. Small left base infiltrate or effusion or combination of both. No pneumothorax or right effusion.    Impression:  1.  Small left infiltrate/effusion.      DISCRETE X-RAY DATA:  Percent of LEFT lung opacification: 1-33%  Percent of RIGHT lung opacification: Clear  Change in lung opacification from most recent x-ray (<=3 days): No Prior  Change from prior dated 3 or more days (same admission): No Prior    BERONICA ROSAS M.D., ATTENDING RADIOLOGIST  This document hasbeen electronically signed. May 19 2020  7:19AM              ROS  [  ] UNABLE TO ELICIT

## 2023-02-22 NOTE — PROGRESS NOTE ADULT - SUBJECTIVE AND OBJECTIVE BOX
Patient is seen and examined at the bed side, is afebrile, and on pressor.  She becomes more altered mentally,  Ammonia level is worsening despite of high dose Lactulose.           REVIEW OF SYSTEMS: Unable to obtain due to mental status         ALLERGIES: No Known Allergies      ICU Vital Signs Last 24 Hrs  T(C): 33.3 (24 Oct 2020 16:14), Max: 35.6 (24 Oct 2020 04:00)  T(F): 92 (24 Oct 2020 16:14), Max: 96 (24 Oct 2020 04:00)  HR: 84 (24 Oct 2020 17:20) (71 - 118)  BP: 89/66 (24 Oct 2020 16:00) (71/54 - 134/103)  BP(mean): 72 (24 Oct 2020 16:00) (57 - 111)  ABP: --  ABP(mean): --  RR: 24 (24 Oct 2020 16:00) (20 - 35)  SpO2: 100% (24 Oct 2020 17:20) (52% - 100%)          PHYSICAL EXAM:  GENERAL: Not in acute distress  CHEST/LUNG: Not using accessory muscles   HEART: s1 and s2 present  ABDOMEN:  Nontender and  Nondistended  EXTREMITIES: No pedal  edema  CNS: Awake , Alert but confused         LABS:                        7.7    4.08  )-----------( 83       ( 24 Oct 2020 06:16 )             24.3                           6.9    6.51  )-----------( 111      ( 22 Oct 2020 13:40 )             20.8       10-24    150<H>  |  125<H>  |  11  ----------------------------<  149<H>  4.0   |  18<L>  |  0.96    Ca    8.2<L>      24 Oct 2020 06:16  Phos  2.7     10-24  Mg     2.3     10-24    TPro  4.8<L>  /  Alb  2.8<L>  /  TBili  2.6<H>  /  DBili  x   /  AST  188<H>  /  ALT  105<H>  /  AlkPhos  112  10-24      10-22    145  |  119<H>  |  10  ----------------------------<  103<H>  4.2   |  16<L>  |  0.85    Ca    7.2<L>      22 Oct 2020 06:37  Phos  2.6     10-22  Mg     2.1     10-22    TPro  4.2<L>  /  Alb  2.4<L>  /  TBili  2.4<H>  /  DBili  x   /  AST  208<H>  /  ALT  87<H>  /  AlkPhos  105  10-22        Procalcitonin, Serum (10.15.20 @ 11:48)   Procalcitonin, Serum: 0.16: Procalcitonin (PCT) Interpretation (ng/mL) - Diagnosis of systemic   bacterial infection/sepsis         MEDICATIONS  (STANDING):    chlorhexidine 0.12% Liquid 15 milliLiter(s) Oral Mucosa every 12 hours  chlorhexidine 2% Cloths 1 Application(s) Topical daily  dextrose 5% + sodium chloride 0.45%. 1000 milliLiter(s) (75 mL/Hr) IV Continuous <Continuous>  lactulose Syrup 30 Gram(s) Oral every 6 hours  meropenem  IVPB 1000 milliGRAM(s) IV Intermittent every 8 hours  midodrine. 10 milliGRAM(s) Oral three times a day  mupirocin 2% Nasal 1 Application(s) Both Nostrils two times a day  norepinephrine Infusion 0.05 MICROgram(s)/kG/Min (2.63 mL/Hr) IV Continuous <Continuous>  nystatin Powder 1 Application(s) Topical two times a day  pantoprazole  Injectable 40 milliGRAM(s) IV Push two times a day  rifAXIMin 550 milliGRAM(s) Oral two times a day        RADIOLOGY & ADDITIONAL TESTS:    10/21/20 : CT Abdomen and Pelvis w/ Oral Cont and w/ IV Cont (10.21.20 @ 15:59) Mural thickening of the left colon and rectum. Liquid stool in the colon. Apparent segmental mural thickening of the mid to distal small bowel and the proximal duodenum. Findings may represent nonspecific enterocolitis, noninfectious inflammatory bowel disease, or ischemic bowel. Clinical correlation is recommended. The celiac axis artery, SMA, and KINA are patent without stenosis.    Dilatation of the mid small bowel is again noted. Oral contrast has reached the terminal ileum. Findings may represent small bowel obstruction, or ileus related to nonspecific enterocolitis.   Cholelithiasis.    Moderate to large ascites in the abdomen, increased since the previous examination. 5 mm nonspecific noncalcified left upper lobe lung nodule; if the patient's is in the high risk category (i.e. smoker), follow-up chest CT may be pursued in 12 months to ensure stability.    Combination of atelectasis and consolidation in the left lower lobe.    Possible 1.0 cm hypodense lesion in the left lobe of the thyroid. Thyroid ultrasound may be pursued for further evaluation.   Mild bilateral pleural effusions.    Aging determinate compression fracture at T5 vertebra.          10/13/20 : CT Abdomen and Pelvis w/ IV Cont (10.13.20 @ 18:50) >    Diffuse dilatation of small bowel loops without a clear transition is slightly increased, likely an ileus.  Decreased moderate ascites.    1.5 cm pancreatic versus peripancreatic soft tissue nodule    10/13/20 : Xray Chest 1 View- PORTABLE-Urgent (Xray Chest 1 View- PORTABLE-Urgent .) (10.13.20 @ 16:34) Clear lungs.          MICROBIOLOGY DATA:    MRSA/MSSA PCR (10.21.20 @ 09:41)   MRSA PCR Result.: Detected:       Culture - Blood (10.20.20 @ 22:09)   Specimen Source: .Blood Blood   Culture Results:  No growth to date.     Culture - Blood (10.20.20 @ 22:09)   Specimen Source: .Blood Blood   Culture Results:   No growth to date.     Culture - Blood in AM (10.16.20 @ 10:13)   Specimen Source: .Blood Blood-Peripheral   Culture Results:   No growth to date.     Culture - Blood in AM (10.16.20 @ 10:13)   Specimen Source: .Blood Blood-Peripheral   Culture Results:   No growth to date.     Culture - Blood (10.13.20 @ 22:23)   Growth in anaerobic bottle: Gram Negative Rods   Specimen Source: .Blood Blood-Peripheral   Organism: Blood Culture PCR   Culture Results:   Growth in anaerobic bottle: Bacteroides fragilis   "Susceptibilities not performed"   ***Blood Panel PCR results on this specimen are available   approximately 3 hours after the Gram stain result.***   Gram stain, PCR, and/or culture results may not always   correspond due to difference in methodologies.     Culture - Blood (10.13.20 @ 22:23)   Specimen Source: .Blood Blood-Peripheral   Culture Results:   No growth to date.     Urine Microscopic-Add On (NC) (10.13.20 @ 21:39)   Bacteria: Moderate /HPF   Epithelial Cells: Moderate /HPF   Red Blood Cell - Urine: 5-10 /HPF   White Blood Cell - Urine: 6-10 /HPF                  Patient is seen and examined at the bed side, is hypothermic. , and remains on pressor.  She is s/p intubated.  The CXR from Today shows  Questionable right upper lobe infiltrate and CT chest from 10/21 shows Combination of atelectasis and consolidation in the left lower lobe.        REVIEW OF SYSTEMS: Unable to obtain due to mental status         ALLERGIES: No Known Allergies      ICU Vital Signs Last 24 Hrs  T(C): 33.3 (24 Oct 2020 16:14), Max: 35.6 (24 Oct 2020 04:00)  T(F): 92 (24 Oct 2020 16:14), Max: 96 (24 Oct 2020 04:00)  HR: 84 (24 Oct 2020 17:20) (71 - 118)  BP: 89/66 (24 Oct 2020 16:00) (71/54 - 134/103)  BP(mean): 72 (24 Oct 2020 16:00) (57 - 111)  ABP: --  ABP(mean): --  RR: 24 (24 Oct 2020 16:00) (20 - 35)  SpO2: 100% (24 Oct 2020 17:20) (52% - 100%)          PHYSICAL EXAM:  GENERAL: Intubated /vented  CHEST/LUNG: Not using accessory muscles   HEART: s1 and s2 present  ABDOMEN:  Nontender and  Nondistended  EXTREMITIES: No pedal  edema  CNS: Intubated/vented          LABS:                        7.7    4.08  )-----------( 83       ( 24 Oct 2020 06:16 )             24.3                           6.9    6.51  )-----------( 111      ( 22 Oct 2020 13:40 )             20.8       10-24    150<H>  |  125<H>  |  11  ----------------------------<  149<H>  4.0   |  18<L>  |  0.96    Ca    8.2<L>      24 Oct 2020 06:16  Phos  2.7     10-24  Mg     2.3     10-24    TPro  4.8<L>  /  Alb  2.8<L>  /  TBili  2.6<H>  /  DBili  x   /  AST  188<H>  /  ALT  105<H>  /  AlkPhos  112  10-24      10-22    145  |  119<H>  |  10  ----------------------------<  103<H>  4.2   |  16<L>  |  0.85    Ca    7.2<L>      22 Oct 2020 06:37  Phos  2.6     10-22  Mg     2.1     10-22    TPro  4.2<L>  /  Alb  2.4<L>  /  TBili  2.4<H>  /  DBili  x   /  AST  208<H>  /  ALT  87<H>  /  AlkPhos  105  10-22        Procalcitonin, Serum (10.15.20 @ 11:48)   Procalcitonin, Serum: 0.16: Procalcitonin (PCT) Interpretation (ng/mL) - Diagnosis of systemic   bacterial infection/sepsis         MEDICATIONS  (STANDING):    chlorhexidine 0.12% Liquid 15 milliLiter(s) Oral Mucosa every 12 hours  chlorhexidine 2% Cloths 1 Application(s) Topical daily  dextrose 5% + sodium chloride 0.45%. 1000 milliLiter(s) (75 mL/Hr) IV Continuous <Continuous>  lactulose Syrup 30 Gram(s) Oral every 6 hours  meropenem  IVPB 1000 milliGRAM(s) IV Intermittent every 8 hours  midodrine. 10 milliGRAM(s) Oral three times a day  mupirocin 2% Nasal 1 Application(s) Both Nostrils two times a day  norepinephrine Infusion 0.05 MICROgram(s)/kG/Min (2.63 mL/Hr) IV Continuous <Continuous>  nystatin Powder 1 Application(s) Topical two times a day  pantoprazole  Injectable 40 milliGRAM(s) IV Push two times a day  rifAXIMin 550 milliGRAM(s) Oral two times a day        RADIOLOGY & ADDITIONAL TESTS:    10/23/20 : Xray Chest 1 View-PORTABLE IMMEDIATE (Xray Chest 1 View-PORTABLE IMMEDIATE .) (10.23.20 @ 19:09) Feeding tube tip near GE junction as noted. Questionable right upper lobe infiltrate. Follow-up study is recommended as clinically warranted.      10/21/20 : CT Abdomen and Pelvis w/ Oral Cont and w/ IV Cont (10.21.20 @ 15:59) Mural thickening of the left colon and rectum. Liquid stool in the colon. Apparent segmental mural thickening of the mid to distal small bowel and the proximal duodenum. Findings may represent nonspecific enterocolitis, noninfectious inflammatory bowel disease, or ischemic bowel. Clinical correlation is recommended. The celiac axis artery, SMA, and KINA are patent without stenosis.    Dilatation of the mid small bowel is again noted. Oral contrast has reached the terminal ileum. Findings may represent small bowel obstruction, or ileus related to nonspecific enterocolitis.   Cholelithiasis.    Moderate to large ascites in the abdomen, increased since the previous examination. 5 mm nonspecific noncalcified left upper lobe lung nodule; if the patient's is in the high risk category (i.e. smoker), follow-up chest CT may be pursued in 12 months to ensure stability.    Combination of atelectasis and consolidation in the left lower lobe.    Possible 1.0 cm hypodense lesion in the left lobe of the thyroid. Thyroid ultrasound may be pursued for further evaluation.   Mild bilateral pleural effusions.    Aging determinate compression fracture at T5 vertebra.        10/13/20 : CT Abdomen and Pelvis w/ IV Cont (10.13.20 @ 18:50) >    Diffuse dilatation of small bowel loops without a clear transition is slightly increased, likely an ileus.  Decreased moderate ascites.    1.5 cm pancreatic versus peripancreatic soft tissue nodule    10/13/20 : Xray Chest 1 View- PORTABLE-Urgent (Xray Chest 1 View- PORTABLE-Urgent .) (10.13.20 @ 16:34) Clear lungs.          MICROBIOLOGY DATA:    MRSA/MSSA PCR (10.21.20 @ 09:41)   MRSA PCR Result.: Detected:       Culture - Blood (10.20.20 @ 22:09)   Specimen Source: .Blood Blood   Culture Results:  No growth to date.     Culture - Blood (10.20.20 @ 22:09)   Specimen Source: .Blood Blood   Culture Results:   No growth to date.     Culture - Blood in AM (10.16.20 @ 10:13)   Specimen Source: .Blood Blood-Peripheral   Culture Results:   No growth to date.     Culture - Blood in AM (10.16.20 @ 10:13)   Specimen Source: .Blood Blood-Peripheral   Culture Results:   No growth to date.     Culture - Blood (10.13.20 @ 22:23)   Growth in anaerobic bottle: Gram Negative Rods   Specimen Source: .Blood Blood-Peripheral   Organism: Blood Culture PCR   Culture Results:   Growth in anaerobic bottle: Bacteroides fragilis   "Susceptibilities not performed"   ***Blood Panel PCR results on this specimen are available   approximately 3 hours after the Gram stain result.***   Gram stain, PCR, and/or culture results may not always   correspond due to difference in methodologies.     Culture - Blood (10.13.20 @ 22:23)   Specimen Source: .Blood Blood-Peripheral   Culture Results:   No growth to date.     Urine Microscopic-Add On (NC) (10.13.20 @ 21:39)   Bacteria: Moderate /HPF   Epithelial Cells: Moderate /HPF   Red Blood Cell - Urine: 5-10 /HPF   White Blood Cell - Urine: 6-10 /HPF                  all other ROS negative except as per HPI

## 2023-02-24 NOTE — ED ADULT TRIAGE NOTE - PRO INTERPRETER NEED 2
Hospital course was also complicated by Acute respiratory failure, inability to be weaned from vent, S/p Trach ( # 8 Cuffed Portex) and PEG 1/19, GI bleed (EGD 1/24 with moderate gastritis); for which she is receiving PPI BID, Renal Failure since transitioned from Peritoneal HD to HD, Seizures ( Being txd with Vimpat) and worsening Thrombocytopenia requiring plt transfusions and course of IV Solumedrol ( 1/30-2/4). EVD Removed on 1/31. Patient transferred to the RCU on 2/2. On 2/5 patient pulled out Left femoral Shiley; RRT was called in setting of excessive bleeding and thrombocytopenia; patient required PRBCs. S/p RT IJ Shiley placement on 2/6. Patient remained with Persistent Thrombocytopenia and was txd with IVIG on 2/7 and 2/8. Patient required Trach to be exchanged on 12/12 to # 6 Cuffed Distal XLT due to tracheomalacia vs granulation tissue noted in posterior wall, causing obstruction. Patient was weaned to TC ATC as of 2/14. English

## 2023-03-03 NOTE — CHART NOTE - NSCHARTNOTEFT_GEN_A_CORE
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 Very tiny area of eschar but wound otherwise clean.  No evidence of purulent drainage.  No active infection    Sacral decubitus not source of fevers.     Reconsult as needed No

## 2023-03-30 NOTE — DIETITIAN INITIAL EVALUATION ADULT. - REASON INDICATOR FOR ASSESSMENT
HR dropped to 30, pt has chest pain. Nitro prn and morphine prn given, ekg ordered, echo ordered, cardiology consulted.  Dd elevated, cta chest ordered  Pt moved to pcu for closer monitoring Assessment & Education.

## 2023-04-24 NOTE — DISCHARGE NOTE NURSING/CASE MANAGEMENT/SOCIAL WORK - NSTRANSFERBELONGINGSDISPO_GEN_A_NUR
Problem: Adult Inpatient Plan of Care  Goal: Plan of Care Review  Outcome: Ongoing, Progressing  Goal: Patient-Specific Goal (Individualized)  Outcome: Ongoing, Progressing  Goal: Absence of Hospital-Acquired Illness or Injury  Outcome: Ongoing, Progressing  Goal: Optimal Comfort and Wellbeing  Outcome: Ongoing, Progressing  Goal: Readiness for Transition of Care  Outcome: Ongoing, Progressing      with patient

## 2023-05-07 NOTE — DISCHARGE NOTE ADULT - NS MD DC FALL RISK RISK
For information on Fall & Injury Prevention, visit [email or web address]
(1) More than 48 hours/None

## 2023-05-24 NOTE — ED PROVIDER NOTE - QUALITY
What Type Of Note Output Would You Prefer (Optional)?: Bullet Format Hpi Title: Evaluation of Skin Lesions How Severe Are Your Spot(S)?: mild aching Have Your Spot(S) Been Treated In The Past?: has not been treated Family Member: Father

## 2023-07-04 NOTE — DISCHARGE NOTE ADULT - NS MD DC FALL RISK RISK
N/V In triage, new complaint.
For information on Fall & Injury Prevention, visit [email or web address]

## 2023-07-21 NOTE — H&P ADULT - PROBLEM SELECTOR PROBLEM 6
"Need for prophylactic measure

## 2023-07-27 NOTE — PATIENT PROFILE ADULT. - PRO INTERPRETER NEED 2
[FreeTextEntry1] : Dear Dr. Sofía Ewing,\par \par I had the pleasure of seeing your patient AMELIA SHARMA in the office today.  My office note is attached. PLEASE READ THE "ASSESSMENT" SECTION OF THE NOTE TO SEE MY IMPRESSION AND PLAN.\par \par Thank you very much for allowing me to participate in the care of your patient.\par \par Sincerely,\par \par Yung Burns M.D., FAC, FACP\par Director, Celiac Program at [facility]\par  of Medicine, [employer] at [facility] Adjunct  of Medicine, [facility] Practice Director, [facility] - Gastroenterology at [place]\[facility] [address] - [address]\[place] [address]\par Tel: [phone]\par Email: [email or web address] \par \par The attached note has been created using a voice recognition system (Dragon).  There may be some misspellings and typos.  Please call my office if you have any issues or questions. 
English

## 2023-08-07 NOTE — PROGRESS NOTE ADULT - PROBLEM SELECTOR PROBLEM 8
Unlikely to be carvedilol. This does not cause a cough. Could be Entresto but if she has been on it for 5 years it would be highly unlikely. If she has postnasal drip, I would treat with antiallergy medications first but avoid decongestants. If cough persists we can try holding Entresto but I will put her at some risk for heart failure in the future. Need for prophylactic measure

## 2023-08-11 NOTE — H&P ADULT - PROBLEM SELECTOR PLAN 3
IMPROVE VTE Individual Risk Assessment          RISK                                                          Points  [  ] Previous VTE                                                3  [ x ] Thrombophilia                                             2  [  ] Lower limb paralysis                                   2        (unable to hold up >15 seconds)    [  ] Current Cancer                                             2         (within 6 months)  [  ] Immobilization > 24 hrs                              1  [  ] ICU/CCU stay > 24 hours                             1  [ x ] Age > 60                                                         1    IMPROVE VTE Score: 3  Full dose Lovenox for acute DVT No (0)

## 2023-09-12 NOTE — PATIENT PROFILE ADULT. - NS PRO ABUSE SCREEN SUSPICION NEGLECT YN
Mid-Level Procedure Text (A): After obtaining clear surgical margins the patient was sent to a mid-level provider for surgical repair.  The patient understands they will receive post-surgical care and follow-up from the mid-level provider. no

## 2023-11-03 NOTE — ED ADULT TRIAGE NOTE - BP NONINVASIVE DIASTOLIC (MM HG)
Daily Note     Today's date: 11/3/2023  Patient name: Richy Hernandez  : 1953  MRN: [id number]  Referring provider: Josef Villagran  Dx:   Encounter Diagnosis     ICD-10-CM    1. Other injury of muscle(s) and tendon(s) of the rotator cuff of right shoulder, initial encounter  S46.091A                      Subjective: Lev Pineda reports that she is doing well, feeling better overall with only seldom pain       Objective: See treatment diary below      Assessment: Tolerated treatment well. Patient exhibited good technique with therapeutic exercises and would benefit from continued PT      Plan: Continue per plan of care.       Precautions: None      Manuals             GHJ grade III Mobilizations  AF                                                   Neuro Re-Ed             Scap retraction 30 yellow            Low row  30 yellow            Wall slides  30            SL ER 30             Yellow  30            Pulleys  5 min             Robbery 30 yellow              Ther Ex                                                                                                                     Ther Activity                                       Gait Training                                       Modalities
67

## 2023-11-14 NOTE — PROGRESS NOTE ADULT - PROBLEM SELECTOR PLAN 5
patient has history of recurrent SBO  patient currently denies any abdominal pain, nausea or vomiting  denies any diarrhea or constipation  monitor for now   GI Dr. Chatterjee 20-Nov-2023

## 2023-12-05 NOTE — ED PROVIDER NOTE - BREATH SOUNDS
crackles in bilateral bases What Type Of Note Output Would You Prefer (Optional)?: Bullet Format How Severe Is Your Skin Lesion?: moderate Has Your Skin Lesion Been Treated?: not been treated Is This A New Presentation, Or A Follow-Up?: Skin Lesion

## 2024-01-08 NOTE — PROGRESS NOTE ADULT - SUBJECTIVE AND OBJECTIVE BOX
Pt bib parents for MD referral for large teja, HA, V, eye pain. hypeprglycemia   MARIBETH PADILLA    SCU progress note    INTERVAL HPI/OVERNIGHT EVENTS: ***No overnight events. Scheduled for PICC line today.    DNR [x ]   DNI  [  ]   TRIAL OF INTUBATION    Covid - 19 PCR: Negative 11/17    The 4Ms    What Matters Most: see GOC  Age appropriate Medications/Screen for High Risk Medication: Yes  Mentation: see CAM below  Mobility: defer to physical exam    The Confusion Assessment Method (CAM) Diagnostic Algorithm     1: Acute Onset or Fluctuating Course  - Is there evidence of an acute change in mental status from the patient’s baseline? Did the (abnormal) behavior  fluctuate during the day, that is, tend to come and go, or increase and decrease in severity?  [ ] YES [x ] NO     2: Inattention  - Did the patient have difficulty focusing attention, being easily distractible, or having difficulty keeping track of what was being said?  [ ] YES [x ] NO     3: Disorganized thinking  -Was the patient’s thinking disorganized or incoherent, such as rambling or irrelevant conversation, unclear or illogical flow of ideas, or unpredictable switching from subject to subject?  [ ] YES [x ] NO    4: Altered Level of consciousness?  [ ] YES [ ] NO    The diagnosis of delirium by CAM requires the presence of features 1 and 2 and either 3 or 4.    PRESSORS: [ ] YES [x ] NO  DAPTOmycin IVPB 350 milliGRAM(s) IV Intermittent every 24 hours  fluconAZOLE IVPB 200 milliGRAM(s) IV Intermittent every 24 hours  fluconAZOLE IVPB      meropenem  IVPB 500 milliGRAM(s) IV Intermittent every 8 hours  rifAXIMin 550 milliGRAM(s) Oral two times a day    Cardiovascular:  Heart Failure  Acute   Acute on Chronic  Chronic       furosemide    Tablet 20 milliGRAM(s) Oral daily    Pulmonary:  ALBUTerol    90 MICROgram(s) HFA Inhaler 2 Puff(s) Inhalation every 6 hours PRN    Hematalogic:  enoxaparin Injectable 50 milliGRAM(s) SubCutaneous daily    Other:  acetaminophen   Tablet .. 650 milliGRAM(s) Oral every 6 hours PRN  ascorbic acid 500 milliGRAM(s) Oral two times a day  chlorhexidine 2% Cloths 1 Application(s) Topical daily  multivitamin/minerals 1 Tablet(s) Oral daily  mupirocin 2% Nasal 1 Application(s) Nasal two times a day  nystatin Powder 1 Application(s) Topical two times a day  pantoprazole   Suspension 40 milliGRAM(s) Enteral Tube daily  sodium chloride 0.9% lock flush 10 milliLiter(s) IV Push every 1 hour PRN  zinc sulfate 220 milliGRAM(s) Oral daily    acetaminophen   Tablet .. 650 milliGRAM(s) Oral every 6 hours PRN  ALBUTerol    90 MICROgram(s) HFA Inhaler 2 Puff(s) Inhalation every 6 hours PRN  ascorbic acid 500 milliGRAM(s) Oral two times a day  chlorhexidine 2% Cloths 1 Application(s) Topical daily  DAPTOmycin IVPB 350 milliGRAM(s) IV Intermittent every 24 hours  enoxaparin Injectable 50 milliGRAM(s) SubCutaneous daily  fluconAZOLE IVPB 200 milliGRAM(s) IV Intermittent every 24 hours  fluconAZOLE IVPB      furosemide    Tablet 20 milliGRAM(s) Oral daily  meropenem  IVPB 500 milliGRAM(s) IV Intermittent every 8 hours  multivitamin/minerals 1 Tablet(s) Oral daily  mupirocin 2% Nasal 1 Application(s) Nasal two times a day  nystatin Powder 1 Application(s) Topical two times a day  pantoprazole   Suspension 40 milliGRAM(s) Enteral Tube daily  rifAXIMin 550 milliGRAM(s) Oral two times a day  sodium chloride 0.9% lock flush 10 milliLiter(s) IV Push every 1 hour PRN  zinc sulfate 220 milliGRAM(s) Oral daily    Drug Dosing Weight  Height (cm): 167.6 (21 Oct 2020 02:26)  Weight (kg): 56.2 (21 Oct 2020 02:26)  BMI (kg/m2): 20 (21 Oct 2020 02:26)  BSA (m2): 1.63 (21 Oct 2020 02:26)    CENTRAL LINE: [ ] YES [x ] NO  LOCATION:   DATE INSERTED:  REMOVE: [ ] YES [ ] NO  EXPLAIN:    TREJO: [ ] YES [x] NO    DATE INSERTED:  REMOVE:  [ ] YES [ ] NO  EXPLAIN:    PAST MEDICAL & SURGICAL HISTORY:  Anasarca    Pulmonary embolism    DVT (deep venous thrombosis)    SBO (small bowel obstruction)    H/O exploratory laparotomy                      PHYSICAL EXAM:    GENERAL: NAD, cachectic  HEAD:  Atraumatic, Normocephalic  EYES: EOMI, PERRLA, conjunctiva and sclera clear  ENMT: No tonsillar erythema, exudates  NECK: Supple, No JVD,  NERVOUS SYSTEM:  Alert & Oriented X2, Follows commands. Moving all extremities.  CHEST/LUNG: Clear to percussion bilaterally; No rales, rhonchi, wheezing, or rubs  HEART: Regular rate and rhythm; No murmurs, rubs, or gallops  ABDOMEN: Soft, Nontender, Nondistended; Bowel sounds present  EXTREMITIES:  2+ Peripheral Pulses, No clubbing, cyanosis, or edema  LYMPH: No lymphadenopathy noted  SKIN: DTI sacrum      LABS:  CBC Full  -  ( 30 Nov 2020 05:53 )  WBC Count : 6.12 K/uL  RBC Count : 2.92 M/uL  Hemoglobin : 8.2 g/dL  Hematocrit : 28.2 %  Platelet Count - Automated : 288 K/uL  Mean Cell Volume : 96.6 fl  Mean Cell Hemoglobin : 28.1 pg  Mean Cell Hemoglobin Concentration : 29.1 gm/dL  Auto Neutrophil # : x  Auto Lymphocyte # : x  Auto Monocyte # : x  Auto Eosinophil # : x  Auto Basophil # : x  Auto Neutrophil % : x  Auto Lymphocyte % : x  Auto Monocyte % : x  Auto Eosinophil % : x  Auto Basophil % : x    11-30    147<H>  |  112<H>  |  18  ----------------------------<  83  3.8   |  33<H>  |  0.78    Ca    7.8<L>      30 Nov 2020 05:53  Phos  3.8     11-30  Mg     1.6     11-30    TPro  6.4  /  Alb  2.0<L>  /  TBili  1.8<H>  /  DBili  x   /  AST  72<H>  /  ALT  47  /  AlkPhos  271<H>  11-30              [  ]  DVT Prophylaxis  [  ]  Nutrition, Brand, Rate         Goal Rate        Abnormal Nutritional Status -  Malnutrition   Cachexia      Morbid Obesity BMI >/=40    RADIOLOGY & ADDITIONAL STUDIES:  ***  < from: Xray Chest 1 View-PORTABLE IMMEDIATE (Xray Chest 1 View-PORTABLE IMMEDIATE .) (11.30.20 @ 15:01) >  There is atelectatic consolidation of the left lower lobe. There is significant infiltrate in most of the right midlung field with concentration in the right lower lobe with associated effusion. The infiltrate has increased in the right upper lobe compared to November 29.    Present film shows a left PICC line with tip in superior vena cava.    IMPRESSION: Advanced bilateral lung processes somewhat increased in the right upper lobe. Left PICC line inserted.    < end of copied text >    Goals of Care Discussion with Family/Proxy/Other   - see note from  11/18

## 2024-01-11 NOTE — PROGRESS NOTE ADULT - NEGATIVE NEUROLOGICAL SYMPTOMS
[FreeTextEntry1] : Severe persistent asthma with recurrent exacerbation SP recetn exacerbation around Xmas secondary to viral infection.  Improving  Eosinophilic allergic type.  On Xolair Patient continues to use Xolair.  She is benefiting from Xolair therapy.   Severe nasal polyposis.  SP recent nasal surgery in [place].  HO mild NARDA
no syncope/no vertigo/no loss of consciousness/no headache/no tremors
no syncope/no vertigo/no loss of consciousness/no tremors/no headache
no syncope/no vertigo/no tremors/no headache/no loss of consciousness

## 2024-02-02 NOTE — PROGRESS NOTE ADULT - ASSESSMENT
normal appearance , without tenderness upon palpation , no deformities , trachea midline , Thyroid normal size , no masses , thyroid nontender 65y Female from home, lives with daughter, ambulates with walker with PMH of recurrent SBO's, s/p exp lap, SB resection in 2015, ex lap, ALBINA in 2018, DVT, PE, on Xarelto, IVC filter, chronic leg swelling, and anasarca, came in to the ED due to hypotension during office visit. Patient was found to be bacteremic with bacteroids fragilis. Admitted in ICU due to hypotension and hypothermia. patient completed course of antibiotics . BCx X2 negative. Ammonia level elevated and patient refused NGT placement, mental status deteriorated and patient desaturated to high 70%, patient got intubated on 10/24 . Sputum was positive for MRSA and Stenotrophomonas. patient started on vancomycin and Levaquin. patient was given lactulose for high ammonia level. kidney function and liver function started to deteriorate and patient was leaning toward multi organ failure . high dose of Lasix started and patient was aggressively diuresed. Kidney function improved . Ammonia level dropped to less than 10.  Mental status improved and patient extubated on 11/3/2020. Patient was seen by wound specialist and decubitus ulcer debrided. patient had a drop in h/h s/p debridement . received 1 unit PRBC .     11/12  Transferred from ICU to SCU  `11/17  Midodrine d/c secondary to daptomycin being started.  11/18 Febrile, Tmax 101.5. Repeat BC sent.   11/19 Discontinue velazquez.   11/20 febrile overnight.   11/22 s/p 2 units PRBC for Hgb 6.4  11/24 MRI pelvis (+) OM  11/25 PAVAN no endocarditis. Awaiting PICC  11/27 Spiked fever of 101F; Repeated cultures, chest xray, lactate, Follow up results. IR Denies PICC Placement due to fever. Plan for IR Monday (NPO Sunday) if afebrile over the weekend. Given one dose of lasix IV today for pleural effusion on chest xray; Give one more dose of IV Lasix 40mg tomorrow at 2pm if BP stable. Continue with daily lasix.

## 2024-02-23 NOTE — CONSULT NOTE ADULT - RS GEN PE MLT RESP DETAILS PC
respirations non-labored/airway patent/good air movement/clear to auscultation bilaterally/breath sounds equal None

## 2024-02-27 NOTE — CONSULT NOTE ADULT - PEDAL EDEMA TYPE
Spoke to Lexis PEMBERTON and she will call their pharmacy and have them check poly pharmacy.   pitting

## 2024-03-14 NOTE — DIETITIAN INITIAL EVALUATION ADULT. - +GENDER
Patient was seen today via video visit with Dr. Sauer. Refill request addressed.  Noemi Garcia RNCC     Statement Selected

## 2024-04-08 NOTE — PROGRESS NOTE ADULT - [facility] HE PE MLT D E PC
[No Pertinent Cardiac History] : no history of aortic stenosis, atrial fibrillation, coronary artery disease, recent myocardial infarction, or implantable device/pacemaker [No Pertinent Pulmonary History] : no history of asthma, COPD, sleep apnea, or smoking [No Adverse Anesthesia Reaction] : no adverse anesthesia reaction in self or family member [(Patient denies any chest pain, claudication, dyspnea on exertion, orthopnea, palpitations or syncope)] : Patient denies any chest pain, claudication, dyspnea on exertion, orthopnea, palpitations or syncope [Moderate (4-6 METs)] : Moderate (4-6 METs) [Chronic Anticoagulation] : no chronic anticoagulation [Chronic Kidney Disease] : no chronic kidney disease [Diabetes] : no diabetes [FreeTextEntry1] : laparoscopic or min-laparotomy myomectomy [FreeTextEntry2] : 4/25/24 [FreeTextEntry3] : [facility] Tel)  FAX) [phone]. [FreeTextEntry4] : Ms. ALICE FORTE is a 29 year old Black or  Apoorva  female  with history of  acne vulgaris, taction alopecia, LEXI, Leiomyoma of uterus, menorrhagia, presented today for preop medical clearance.  She reports very heavy menses. LMP 3/16/24. Period lasts for a week. Recent 2 years her periods gradually got heavier. She can't hold her  bladder for a long time due to her uterus got big. She is taking Iron supplement with vitamin C. She expresses concerns on her fertility given her young age but also she is trying to be positive. Occasional vaping for recent 2 years. G0. She wishes all full lab test today including A1c, Lipid, vitamin D and TSH. Denied fever, chills,CP, SOB, abdominal pain, n/v/c/d. regular rate and rhythm/no rub

## 2024-05-09 NOTE — ED ADULT TRIAGE NOTE - ESI TRIAGE ACUITY LEVEL, MLM
Is This A New Presentation, Or A Follow-Up?: Rash Additional History: Worse in summer, present in winter, otc hydrocortisone helps for a bit, got triamcinolone from pcp, rash not resolving. 3

## 2024-05-31 NOTE — ED PROVIDER NOTE - PROGRESS NOTE DETAILS
Addended by: HUSEYIN CASTELAN on: 5/31/2024 09:29 AM     Modules accepted: Orders     spoke with Dr. Ross who reports in-office BP 80/40 with tachycardia. patient has history of peritonitis. non-tender and non-distended on exam but patient ill appearing. will admit per pcp request for monitoring. MAR requesting CT abdomen 2/2 history. Campos [place]

## 2024-07-10 NOTE — ED PROVIDER NOTE - MEDICAL DECISION MAKING DETAILS
LMP 04/05/24 patient stated she visited another office before coming to our office patient was given our fax number [phone] for [facility] records   sent in by PMD for low H/H, for transfusion

## 2024-07-30 NOTE — ED PROVIDER NOTE - OBJECTIVE STATEMENT
Subjective   Patient ID: Donald Manweiler is a 72 y.o. male who presents for Follow-up.    Patient seen today in a private group home day care. He is awake poor historian due to MRDD, dependent on staff for all historical information, and does have a guardian. House Nurse is present for home visits providing historical information. Patient is homebound due to overall functional decline related to his MRDD. PMH: Developmental Delay, H/o Seizure Disorder, H/o PAF, H/o GERD, and H/o HTN.     Post Acute 7-7-24: Patient presents emergency department for evaluation of episode of reported syncope.  Patient has a history of severe developmental delay, cerebral palsy, and is ANO 1 at baseline and relatively nonverbal.  Patient lives at a group home where EMS was called for reported unresponsive episode.  EMS reports what they feel is a syncopal episode as patient was sitting in a chair when he lost consciousness and then later regained consciousness.  Patient shakes head no when asked if having any pain.  He denies any chest pain, shortness of breath and is alert, active and in good spirits.  EMS reports no other symptoms at this time and no fall or injury.    [facility] - Reported that he has met mobility goals.  Requires assistance with all ADL's, including, standing and transferring. Staff reports biggest problem is diarrhea - reports that he drinks boost daily every morning and shortly after that he has explosive diarrhea.  Will stop boost at this time.  Currently is on puree diet, and staff also reports that appetite has improved and is eating three good meals daily, however due to frequency of diarrhea, he has had significant weight loss.  Confirmed with  that stool softeners have been discontinued.  Staff does not have actual weights recorded, however visibly appears thinning.  He is now wearing a helmet for his own safety.  Remains in his recliner chair most of the day,  63 yo F pmh of anemia, DVT/PE (on xarelto), and hx SBO sent in by PCP for a few days of generalized weakness. Pt states she needs someone to help her to walk b/c of fatigue. Also states "I need someone to get rid of the extra water in my body." Denies other acute complaints. staff is assisting with all ADL's.  Incontinent of bowel and bladder.  Appears to be urinating adequately with no s/s of infection at this time.  No additional syncopal episodes or falls noted. There is no fever, or chills noted. No nausea, vomiting.  There is no hematuria, dysuria, or noted increased urgency.    Home Visit: Medically necessary due to: dementia/cognitive impairment, unsteady gait/poor balance, shortness of breath with minimal exertion, Illness or condition that results in activity limitation or restriction that impacts the ability to leave home such as: equipment and /or human assistance needed to safely leave the home, patient has limited support systems to help the patient attend office visits, the office visit would require excessive physical effort or pain for the patient.              Current Outpatient Medications:     acetaminophen (Tylenol) 325 mg tablet, GIVE TWO (2) TABLETS (650MG) BY MOUTH THREE TIMES DAILY., Disp: 174 tablet, Rfl: 11    amLODIPine (Norvasc) 2.5 mg tablet, GIVE ONE TABLET BY MOUTH ONCE DAILY FOR HYPERTENSION, Disp: 30 tablet, Rfl: 11    cholecalciferol (Vitamin D-3) 50 MCG (2000 UT) tablet, GIVE ONE TABLET BY MOUTH ONCE DAILY FOR VITAMIN D DEFICIENCY, Disp: 30 tablet, Rfl: 11    Ear Wax Removal Kit 6.5 % otic solution, INSTILL 3 DROPS INTO BOTH EARS THREE TIMES DAILY  **REORDER WHEN NEEDED-NOT A CYCLE FILL MED**, Disp: 15 mL, Rfl: 11    famotidine (Pepcid) 20 mg tablet, Take 1 tablet (20 mg) by mouth as needed at bedtime., Disp: , Rfl:     fluticasone (Flonase) 50 mcg/actuation nasal spray, Administer 1 spray into each nostril once daily., Disp: 16 g, Rfl: 11    lamoTRIgine (LaMICtal) 100 mg tablet, Take 1 tablet (100 mg) by mouth 2 times a day., Disp: , Rfl:     levETIRAcetam (Keppra) 500 mg tablet, Take 1 tablet (500 mg) by mouth 2 times a day., Disp: , Rfl:     lisinopril 10 mg tablet, GIVE ONE TABLET BY MOUTH EVERY TWELVE HOURS FOR HYPERTENSION, Disp: 60 tablet, Rfl:  "11    loperamide (Imodium A-D) 1 mg/7.5 mL liquid, GIVE 2 TABLESPOONSFUL (30ML) BY MOUTH INITIALLY THEN GIVE 1 TABLESPOONFUL (15ML) BY MOUTH AFTER EACH LOOSE STOOL AS NEEDED. MAX QTY: 4 TABLESPOONSFUL (60ML)/DAY, Disp: 360 mL, Rfl: 11    loratadine (Claritin) 10 mg tablet, GIVE ONE TABLET BY MOUTH ONCE DAILY FOR SEASONAL ALLERGY SYMPTOMS, Disp: 30 tablet, Rfl: 11    mirtazapine (Remeron) 7.5 mg tablet, GIVE ONE TABLET BY MOUTH AT BEDTIME. (DX: INSOMNIA), Disp: 30 tablet, Rfl: 11    Myrbetriq 25 mg tablet extended release 24 hr 24 hr tablet, GIVE ONE TABLET BY MOUTH ONCE DAILY (DX: BPH), Disp: 30 tablet, Rfl: 11    Nizoral A-D 1 % shampoo, USE AS DIRECTED TO SHAMPOO LIBERALLY TO SKIN TWICE WEEKLY    **REORDER WHEN NEEDED-NOT A CYCLE FILL MED**, Disp: 200 mL, Rfl: 10    omeprazole (PriLOSEC) 20 mg DR capsule, GIVE ONE CAPSULE BY MOUTH EVERY MORNING 30 MINUTES BEFORE MORNING MEAL (DX: GASTRITIS), Disp: 30 capsule, Rfl: 11    sertraline (Zoloft) 100 mg tablet, Take 1 tablet (100 mg) by mouth once daily., Disp: , Rfl:     sotalol (Betapace) 120 mg tablet, GIVE ONE TABLET BY MOUTH TWICE DAILY (DX: PAROXYSMAL A-FIB), Disp: 60 tablet, Rfl: 11    tamsulosin (Flomax) 0.4 mg 24 hr capsule, GIVE ONE CAPSULE BY MOUTH AT BEDTIME (DX: BPH), Disp: 30 capsule, Rfl: 11     Review of Systems   Unable to perform ROS: Patient nonverbal       Objective   /78   Pulse 88   Temp 35.9 °C (96.7 °F) (Temporal)   Resp 16   Ht 1.702 m (5' 7\")   Wt 61.7 kg (136 lb) Comment: reported  SpO2 95%   BMI 21.30 kg/m²     Physical Exam  Constitutional:       Appearance: Normal appearance.      Comments: Notable for weight loss   HENT:      Head: Normocephalic and atraumatic.      Nose: Nose normal.      Mouth/Throat:      Mouth: Mucous membranes are moist.      Pharynx: Oropharynx is clear.   Eyes:      Extraocular Movements: Extraocular movements intact.      Pupils: Pupils are equal, round, and reactive to light.   Cardiovascular:      " Rate and Rhythm: Normal rate and regular rhythm.      Heart sounds: No murmur heard.  Pulmonary:      Effort: Pulmonary effort is normal.      Breath sounds: Normal breath sounds.   Abdominal:      General: Bowel sounds are normal.      Palpations: Abdomen is soft.   Musculoskeletal:      Cervical back: Neck supple. No tenderness.      Comments: Non ambulatory  Repetative rocking motion   Skin:     Capillary Refill: Capillary refill takes 2 to 3 seconds.   Neurological:      Mental Status: He is alert. Mental status is at baseline.      Motor: Weakness present.   Psychiatric:      Comments: MRDD     Lab Results   Component Value Date    WBC 6.3 07/07/2024    HGB 13.8 07/07/2024    HCT 42.0 07/07/2024     (H) 07/07/2024     07/07/2024      Lab Results   Component Value Date    GLUCOSE 142 (H) 07/07/2024    CALCIUM 9.4 07/07/2024     07/07/2024    K 4.5 07/07/2024    CO2 29 07/07/2024     07/07/2024    BUN 37 (H) 07/07/2024    CREATININE 1.10 07/07/2024 7-7-24 KEPPRA LEVEL; 20    Assessment/Plan   Diagnoses and all orders for this visit:  Diarrhea, unspecified type  Comments:  STOP DOCUSATE - stop boost may be lactose intolerant  Essential hypertension  Comments:  Managed - continue amlodipine 2.5mg daily  Overactive bladder  Severe intellectual disability  Seizure disorder (Multi)  Comments:  Managed - continue Keppra 500mg 2x daily  Keppra level on 7-7-24 was 20 and therapeutic    Will continue with [facility] home visits. Active with multiple specialist, however is however has limited support and difficulty getting out for appointments.      Will stop boost at this time - lactose intolerant    If not already please stop docusate 100mg tabs, PRN ONLY!       Cleo Min, APRN-CNP

## 2024-08-01 NOTE — ED ADULT NURSE NOTE - ED STAT RN HANDOFF DETAILS
Patient endorsed to PIO Story in G2, remains alert, responsive, resting in stretcher. MD evaluation in progress.
no

## 2024-08-01 NOTE — PROGRESS NOTE ADULT - PROBLEM SELECTOR PROBLEM 4
Patient with atypical chest pain as mentioned in the history of present illness left-sided radiating to the shoulder area workup being negative so far in the emergency room.  Exam there is no tenderness noted in the rib cage area movements of the shoulder and neck are not restricted.  Will have her seen once by the cardiologist get an echocardiogram done after that if necessary will also do the CT scan of the chest for further evaluation and treatment.    Patient was started on semaglutide injection about 3 months ago started with initial dose of 0.25 which she had received for couple of months and subsequently it was increased to 0.5 mg.  When she developed this pain she stopped taking it but resumed it again last week.   Encephalopathy

## 2024-08-05 NOTE — PROGRESS NOTE ADULT - PROBLEM SELECTOR PROBLEM 8
Patient: Graciela Norman    Procedure Summary       Date: 08/05/24 Room / Location: [facility] EP [facility] / [facility]    Anesthesia Start: 0800 Anesthesia Stop:     Procedure: Ablation A-Fib Paroxysmal (19422) Diagnosis:       Persistent atrial fibrillation (Multi)      (Persistent atrial fibrillation (Multi) [I48.19])    Providers: Nilson Woods MD Responsible Provider: Melvin Mohamud MD    Anesthesia Type: general ASA Status: 3            Anesthesia Type: general    Vitals Value Taken Time   /69 08/05/24 1239   Temp 36.1 08/05/24 1243   Pulse 69 08/05/24 1242   Resp 16 08/05/24 1243   SpO2 99 % 08/05/24 1242   Vitals shown include unfiled device data.    Anesthesia Post Evaluation    Patient location during evaluation: PACU  Patient participation: complete - patient participated  Level of consciousness: sleepy but conscious  Pain management: adequate  Airway patency: patent  Cardiovascular status: acceptable  Respiratory status: acceptable  Hydration status: acceptable  Postoperative Nausea and Vomiting: none        There were no known notable events for this encounter.     Sacral wound

## 2024-08-13 NOTE — H&P ADULT - GASTROINTESTINAL DETAILS
Caller: Esperanza Deutsch DDS    Relationship: Self    Best call back number:     [phone]       Requested Prescriptions:   Requested Prescriptions     Pending Prescriptions Disp Refills    ciprofloxacin (Cipro) 500 MG tablet 10 tablet 0     Sig: Take 1 tablet by mouth 2 (Two) Times a Day for 5 days.        Pharmacy where request should be sent:      Last office visit with prescribing clinician: 2/27/2024   Last telemedicine visit with prescribing clinician: Visit date not found   Next office visit with prescribing clinician: 3/3/2025     Additional details provided by patient:     Does the patient have less than a 3 day supply:  [x] Yes  [] No    Would you like a call back once the refill request has been completed: [] Yes [] No    If the office needs to give you a call back, can they leave a voicemail: [] Yes [] No    Mary Arnold Rep   08/13/24 09:40 EDT         
bowel sounds normal/soft/no guarding/nontender/no rebound tenderness

## 2024-08-27 NOTE — PATIENT PROFILE ADULT - NSPRESCRUSEDDRG_GEN_A_NUR
Attempted to return pt call to help her get schedule with [facility] due to a family member with a gene mutation. There was no answer and detailed message was left inviting the pt to call back.    No

## 2024-09-05 NOTE — CHART NOTE - NSCHARTNOTEFT_GEN_A_CORE
Assessment:   65yFemalePatient is a 65y old  Female who presents with a chief complaint of Hypotension (17 Nov 2020 15:32). Pt visited. Pt asleep. On O2. Observed TWO papi HN Almost empty . Pt DG from ICU to 4 N on 11/17.  S/P extubation, NG TF D/Cd on 11/10.  S/P SLP eval 11/10 -Pureed Nectar liquids. d/w nSG pT IS FED BY FAMILY / STAFF.  PT eats good.       Factors impacting intake: [ ] none [ ] nausea  [ ] vomiting [ ] diarrhea [ ] constipation  [ ]chewing problems [ ] swallowing issues  [ ] other:     Diet Prescription: Diet, Dysphagia 1 Pureed-Nectar Consistency Fluid:   Supplement Feeding Modality:  Oral  Two Papi HN Cans or Servings Per Day:  3       Frequency:  Three Times a day  Ensure Pudding Cans or Servings Per Day:  2       Frequency:  Two Times a day (11-13-20 @ 13:46)    Intake:  > 50 %     Current Weight:   % Weight Change   Bed scale 110 lb with out scd device.     Pertinent Medications: MEDICATIONS  (STANDING):  dextrose 5% 1000 milliLiter(s) (50 mL/Hr) IV Continuous <Continuous>  dextrose 5% + sodium chloride 0.45% with potassium chloride 10 mEq/L 100 milliLiter(s) (50 mL/Hr) IV Continuous <Continuous>  enoxaparin Injectable 50 milliGRAM(s) SubCutaneous daily  fluconAZOLE IVPB 200 milliGRAM(s) IV Intermittent every 24 hours  fluconAZOLE IVPB      furosemide   Injectable 40 milliGRAM(s) IV Push two times a day  lactulose Syrup 10 Gram(s) Oral daily  midodrine. 10 milliGRAM(s) Oral three times a day  pantoprazole   Suspension 40 milliGRAM(s) Enteral Tube daily  rifAXIMin 550 milliGRAM(s) Oral two times a day    MEDICATIONS  (PRN):    Pertinent Labs: 11-17 Na147 mmol/L<H> Glu 74 mg/dL K+ 3.1 mmol/L<L> Cr  1.58 mg/dL<H> BUN 35 mg/dL<H> 11-17 Phos 3.3 mg/dL 11-14 Alb 2.4 g/dL<L> 10-31 Chol --    LDL --    HDL --    Trig 87 mg/dL     CAPILLARY BLOOD GLUCOSE      POCT Blood Glucose.: 96 mg/dL (17 Nov 2020 06:09)  POCT Blood Glucose.: 133 mg/dL (16 Nov 2020 23:45)    Skin: Coccyx DTI    Estimated Needs:   [ ] no change since previous assessment  [ ] recalculated:     Previous Nutrition Diagnosis:   [ ] Inadequate Energy Intake [ ]Inadequate Oral Intake [ ] Excessive Energy Intake   [ ] Underweight [ ] Increased Nutrient Needs [ ] Overweight/Obesity   [ ] Altered GI Function [ ] Unintended Weight Loss [ ] Food & Nutrition Related Knowledge Deficit [x Severe  ] Malnutrition     Nutrition Diagnosis is [ x] ongoing  [ ] resolved [ ] not applicable     New Nutrition Diagnosis: [ ] not applicable       Interventions:   Recommend  [ ] Change Diet To:  [x ] Nutrition Supplement Continue with TWO PAPI HN TID.  (x) Ensure pudding BID.  [ ] Nutrition Support  [x ] Other:  MVIm, VITC, Zn.    Monitoring and Evaluation:   [ ] PO intake [ x ] Tolerance to diet prescription [ x ] weights [ x ] labs[ x ] follow up per protocol  [ ] other: Assessment:   65yFemalePatient is a 65y old  Female who presents with a chief complaint of Hypotension (17 Nov 2020 15:32). Pt visited. Pt asleep. On O2. Observed TWO papi HN Almost empty . Pt DG from ICU to 4 N on 11/17.  S/P extubation, NG TF D/Cd on 11/10.  S/P SLP eval 11/10 -Pureed Nectar liquids. d/w  NSG . Pt is fed by staff/ Family. Po intake is  good. S/P Debridement of pressure ulcer DTI @ coccyx.      Factors impacting intake: [ ] none [ ] nausea  [ ] vomiting [ ] diarrhea [ ] constipation  [ ]chewing problems [ ] swallowing issues  [ ] other:     Diet Prescription: Diet, Dysphagia 1 Pureed-Nectar Consistency Fluid:   Supplement Feeding Modality:  Oral  Two Papi HN Cans or Servings Per Day:  3       Frequency:  Three Times a day  Ensure Pudding Cans or Servings Per Day:  2       Frequency:  Two Times a day (11-13-20 @ 13:46)    Intake:  > 50 %     Current Weight:   % Weight Change   Bed scale 110 lb with out scd device.     Pertinent Medications: MEDICATIONS  (STANDING):  dextrose 5% 1000 milliLiter(s) (50 mL/Hr) IV Continuous <Continuous>  dextrose 5% + sodium chloride 0.45% with potassium chloride 10 mEq/L 100 milliLiter(s) (50 mL/Hr) IV Continuous <Continuous>  enoxaparin Injectable 50 milliGRAM(s) SubCutaneous daily  fluconAZOLE IVPB 200 milliGRAM(s) IV Intermittent every 24 hours  fluconAZOLE IVPB      furosemide   Injectable 40 milliGRAM(s) IV Push two times a day  lactulose Syrup 10 Gram(s) Oral daily  midodrine. 10 milliGRAM(s) Oral three times a day  pantoprazole   Suspension 40 milliGRAM(s) Enteral Tube daily  rifAXIMin 550 milliGRAM(s) Oral two times a day    MEDICATIONS  (PRN):    Pertinent Labs: 11-17 Na147 mmol/L<H> Glu 74 mg/dL K+ 3.1 mmol/L<L> Cr  1.58 mg/dL<H> BUN 35 mg/dL<H> 11-17 Phos 3.3 mg/dL 11-14 Alb 2.4 g/dL<L> 10-31 Chol --    LDL --    HDL --    Trig 87 mg/dL     CAPILLARY BLOOD GLUCOSE      POCT Blood Glucose.: 96 mg/dL (17 Nov 2020 06:09)  POCT Blood Glucose.: 133 mg/dL (16 Nov 2020 23:45)    Skin: Coccyx DTI    Estimated Needs:   [ ] no change since previous assessment  [ ] recalculated:     Previous Nutrition Diagnosis:   [ ] Inadequate Energy Intake [ ]Inadequate Oral Intake [ ] Excessive Energy Intake   [ ] Underweight [ ] Increased Nutrient Needs [ ] Overweight/Obesity   [ ] Altered GI Function [ ] Unintended Weight Loss [ ] Food & Nutrition Related Knowledge Deficit [x Severe  ] Malnutrition     Nutrition Diagnosis is [ x] ongoing  [ ] resolved [ ] not applicable     New Nutrition Diagnosis: [ ] not applicable       Interventions:   Recommend  [ ] Change Diet To:  [x ] Nutrition Supplement Continue with TWO PAPI HN TID.  (x) Ensure pudding BID.  [ ] Nutrition Support  [x ] Other:  MVIm, VITC, Zn.  (x) D/W NP.    Monitoring and Evaluation:   [ ] PO intake [ x ] Tolerance to diet prescription [ x ] weights [ x ] labs[ x ] follow up per protocol  [ ] other: I have personally seen and examined the patient. I have collaborated with and supervised the

## 2024-10-19 NOTE — CHART NOTE - NSCHARTNOTEFT_GEN_A_CORE
Assessment:   65yFemalePatient is a 65y old  Female who presents with a chief complaint of Hypotension (02 Dec 2020 11:36). Pt Visited. Pt is on O2. Observed pt fed  by staff. D/W PCA pt ate Hot cereal  and  jody TWo Papi HN . Pt eating ~ 50 % if meal. SLP F/U Noted on 12/2 -  [facility] soft w Thin Liquids.  Pt with Pressure ulcer DTI @ coccyx, Unstageable @ Sacrum. S/P Debridement.  Pt with Osteomyelitis.   Labs noted.  Meds Noted on Vit c,       Factors impacting intake: [ ] none [ ] nausea  [ ] vomiting [ ] diarrhea [ ] constipation  [ ]chewing problems [ ] swallowing issues  [ x] other:  Multiple Medical co morbidities.     Diet Prescription : Diet, Dysphagia 1 Pureed-Thin Liquids:   Low Sodium  Supplement Feeding Modality:  Oral  Two Papi HN Cans or Servings Per Day:  2       Frequency:  Two Times a day  Ensure Pudding Cans or Servings Per Day:  2       Frequency:  Two Times a day (12-02-20 @ 11:57)    Intake: ~50 %     Current Weight:   % Weight  Change Bed scale with out scd device 109 LB     Pertinent Medications: MEDICATIONS  (STANDING):  ascorbic acid 500 milliGRAM(s) Oral two times a day  chlorhexidine 2% Cloths 1 Application(s) Topical daily  chlorhexidine 2% Cloths 1 Application(s) Topical daily  DAPTOmycin IVPB 350 milliGRAM(s) IV Intermittent every 24 hours  enoxaparin Injectable 50 milliGRAM(s) SubCutaneous daily  fluconAZOLE IVPB 200 milliGRAM(s) IV Intermittent every 24 hours  fluconAZOLE IVPB      furosemide    Tablet 20 milliGRAM(s) Oral daily  meropenem  IVPB 500 milliGRAM(s) IV Intermittent every 8 hours  multivitamin/minerals 1 Tablet(s) Oral daily  mupirocin 2% Nasal 1 Application(s) Nasal two times a day  nystatin Powder 1 Application(s) Topical two times a day  pantoprazole   Suspension 40 milliGRAM(s) Enteral Tube daily  rifAXIMin 550 milliGRAM(s) Oral two times a day  zinc sulfate 220 milliGRAM(s) Oral daily    MEDICATIONS  (PRN):  acetaminophen   Tablet .. 650 milliGRAM(s) Oral every 6 hours PRN Temp greater or equal to 38C (100.4F)  ALBUTerol    90 MICROgram(s) HFA Inhaler 2 Puff(s) Inhalation every 6 hours PRN Shortness of Breath and/or Wheezing  sodium chloride 0.9% lock flush 10 milliLiter(s) IV Push every 1 hour PRN Pre/post blood products, medications, blood draw, and to maintain line patency    Pertinent Labs: 12-02 Na147 mmol/L<H> Glu 80 mg/dL K+ 3.7 mmol/L Cr  0.74 mg/dL BUN 16 mg/dL 12-02 Phos 3.1 mg/dL 12-02 Alb 2.1 g/dL<L>     CAPILLARY BLOOD GLUCOSE      POCT Blood Glucose.: 114 mg/dL (02 Dec 2020 11:56)  POCT Blood Glucose.: 149 mg/dL (02 Dec 2020 00:06)    Skin: DTI @ Coccyx, Unstagable @ sacrum    Estimated Needs:   [ ] no change since previous assessment  [ ] recalculated:     Previous Nutrition Diagnosis:   [ ] Inadequate Energy Intake [ ]Inadequate Oral Intake [ ] Excessive Energy Intake   [ ] Underweight [ ] Increased Nutrient Needs [ ] Overweight/Obesity   [ ] Altered GI Function [ ] Unintended Weight Loss [ ] Food & Nutrition Related Knowledge Deficit [ x] Malnutrition Severe     Nutrition Diagnosis is [x ] ongoing  [ ] resolved [ ] not applicable     New Nutrition Diagnosis: [ ] not applicable       Interventions:   Recommend  [ ] Change Diet To:  [x ] Nutrition Supplement Continue with Two papi HN BID, and Ensure pudding BID.  (x) Mech soft w Thin liquids Per SLP eval.  [ ] Nutrition Support  [x ] Other:     Monitoring and Evaluation:   [ ] PO intake [ x ] Tolerance to diet prescription [ x ] weights [ x ] labs[ x ] follow up per protocol  [ ] other: not observed

## 2024-12-09 NOTE — PATIENT PROFILE ADULT - DO YOU FEEL UNSAFE AT HOME, WORK, OR SCHOOL?
Check labs in 6 months. Low carbohydrate diet information shared in AVS  Orders:    Comprehensive Metabolic Panel; Future    Hemoglobin A1c; Future    MicroAlbumin, Urine, Random - Urine, Clean Catch; Future     no

## 2024-12-24 NOTE — CONSULT NOTE ADULT - SUBJECTIVE AND OBJECTIVE BOX
Initial (On Arrival) Patient is a 64y old  Female who presents with a chief complaint of low hemoglobin (11 Mar 2020 08:19)      HPI:  Patient is a 64 year old female from home walks with cane, with PMH of recurrent SBO's, s/p exp lap, SB resection in 2015, ex lap, ALBINA in 2018, DVT, PE, on xarelto, IVC filter, chronic leg swelling, and anasarca, who comes in to the ED after being sent by PCP, Dr. Ross for low hemoglobin of around 7. Based on prior admission, patient's hemoglobin seemed to be around 9. Patient had endoscopy and colonoscopy done on 1/29/20 with Dr. Chatterjee stating no evidence of GI bleed. Patient states that she has dark stools but states it has been starting since she has been on iron supplements. States having some general weakness in the knees but states that it is not new. Patient denies any dizziness, lightheadedness, BRBPR, hematemesis, chest pain, SOB, nausea, vomiting, diarrhea or constipation. (08 Mar 2020 18:22)       ROS:  Negative except for:    PAST MEDICAL & SURGICAL HISTORY:  Anasarca  Pulmonary embolism  DVT (deep venous thrombosis)  SBO (small bowel obstruction)  History of intestinal surgery      SOCIAL HISTORY:    FAMILY HISTORY:  No pertinent family history in first degree relatives      MEDICATIONS  (STANDING):  ferrous    sulfate 325 milliGRAM(s) Oral daily  folic acid 1 milliGRAM(s) Oral daily  multivitamin 1 Tablet(s) Oral daily  pantoprazole  Injectable 40 milliGRAM(s) IV Push two times a day  senna 2 Tablet(s) Oral at bedtime    MEDICATIONS  (PRN):      Allergies    No Known Allergies    Intolerances        Vital Signs Last 24 Hrs  T(C): 36.8 (11 Mar 2020 08:18), Max: 36.8 (11 Mar 2020 08:18)  T(F): 98.2 (11 Mar 2020 08:18), Max: 98.2 (11 Mar 2020 08:18)  HR: 67 (11 Mar 2020 08:18) (67 - 86)  BP: 90/60 (11 Mar 2020 08:18) (85/51 - 100/68)  BP(mean): --  RR: 18 (11 Mar 2020 08:18) (17 - 18)  SpO2: 100% (11 Mar 2020 08:18) (96% - 100%)    PHYSICAL EXAM  General: adult in NAD  HEENT: clear oropharynx, anicteric sclera, pink conjunctiva  Neck: supple  CV: normal S1/S2 with no murmur rubs or gallops  Lungs: positive air movement b/l ant lungs,clear to auscultation, no wheezes, no rales  Abdomen: soft non-tender non-distended, no hepatosplenomegaly  Ext: no clubbing cyanosis or edema  Skin: no rashes and no petechiae  Neuro: alert and oriented X 4, no focal deficits      LABS:                          8.3    5.93  )-----------( 165      ( 11 Mar 2020 06:41 )             25.8         Mean Cell Volume : 98.1 fl  Mean Cell Hemoglobin : 31.6 pg  Mean Cell Hemoglobin Concentration : 32.2 gm/dL  Auto Neutrophil # : x  Auto Lymphocyte # : x  Auto Monocyte # : x  Auto Eosinophil # : x  Auto Basophil # : x  Auto Neutrophil % : x  Auto Lymphocyte % : x  Auto Monocyte % : x  Auto Eosinophil % : x  Auto Basophil % : x      Serial CBC's  03-11 @ 06:41  Hct-25.8 / Hgb-8.3 / Plat-165 / RBC-2.63 / WBC-5.93  Serial CBC's  03-10 @ 10:07  Hct-29.9 / Hgb-9.3 / Plat-245 / RBC-3.06 / WBC-5.81  Serial CBC's  03-09 @ 07:02  Hct-26.3 / Hgb-8.6 / Plat-219 / RBC-2.75 / WBC-5.64  Serial CBC's  03-08 @ 22:24  Hct-26.6 / Hgb-8.5 / Plat-237 / RBC-2.75 / WBC-7.50  Serial CBC's  03-08 @ 17:33  Hct-24.8 / Hgb-7.7 / Plat-302 / RBC-2.48 / WBC-6.94      03-10    141  |  112<H>  |  17  ----------------------------<  98  3.9   |  21<L>  |  0.76    Ca    6.6<L>      10 Mar 2020 10:07            Folate, Serum: 16.3 ng/mL (03-09 @ 10:27)  Vitamin B12, Serum: 1180 pg/mL (03-09 @ 10:27)              BLOOD SMEAR INTERPRETATION:       RADIOLOGY & ADDITIONAL STUDIES:    < from: CT Abdomen and Pelvis No Cont (03.10.20 @ 19:25) >  INTERPRETATION:  CT of the Abdomen without contrast and CT of the Pelvis without contrast    HISTORY: Abdominal distention    Comparison: 1/28/2020    TECHNIQUE: Multiple axial scans of the abdomen and pelvis were obtained without administration of IV or bowel contrast material.      Interpretation: Liver: No focal lesions.      Biliary tree: Not dilated. Calcified gallstones are again noted.      Solidorgans: Unchanged.      Retroperitoneum: IVC filter is present.      Bowel: Stomach is distended with what appears to be ingested material. The colon is also distended with air, fluid and fecal material. No small bowel dilatation is identified. Surgical sutures are again noted adjacent to the colon. A small amount of ascites is seen near the liver.    Appendix: Not definitely seen    Urinary bladder: Collapsed.      Pelvis: Shows a small amount of free fluid. There is questionable posterior thickening of the rectal wall. Underlying neoplasm cannot be excluded. Clinical correlation is suggested.    The inguinal regions are unremarkable.      The lung bases are clear.      IMPRESSION: Distended stomach and colon as noted. Rectal thickening as described.    Thank you for  this referral.    < end of copied text >  < from: US Duplex Venous Lower Ext Complete, Bilateral (03.10.20 @ 16:13) >  INTERPRETATION:  CLINICAL INFORMATION: History of left femoral vein DVT noted on prior examination 1/27/2020.    COMPARISON: 1/27/2020    TECHNIQUE: Duplex sonography of the BILATERAL LOWER extremity veins with color and spectral Doppler, with and without compression.      FINDINGS:    Right lower extremity: There is normal compressibility of the right common femoral, femoral and popliteal veins.   Doppler examination shows normal spontaneous and phasic flow.  No calf vein thrombosis is detected.    Left lower extremity: DVT persists within the left femoral vein, with patency identified of the left common femoral vein, popliteal vein and visualized segments of the infragenicular venous branches.    IMPRESSION:     Persistent DVT left femoral vein, previously described. No evidence for right lower extremity DVT.    < end of copied text >

## 2025-01-09 NOTE — PROGRESS NOTE ADULT - PROBLEM SELECTOR PLAN 7
[facility]  [address] [place] [id number]  Office: [phone]   Fax:    [phone]       If you have a headache and/or take your blood pressure and it is >135/85, wait 10 minutes and recheck your blood pressure again and if still > 135/85  then take 1 lisinopril 2.5mg tablet.      Encourage oral intake.  Oral intake remains poor.  Assist with feedings.  Continue 2Cal HN and ensure pudding.

## 2025-02-10 NOTE — PROGRESS NOTE ADULT - CONSTITUTIONAL DETAILS
Per chart review, patient is scheduled for consult with Dr. Soto on 2/17.  
well-groomed/no distress/well-developed
no distress/well-groomed/well-developed

## 2025-05-03 NOTE — CONSULT NOTE ADULT - GIT ABD PE PAL DETAILS PC
[facility]    Cardiogenic shock: SvO2 / lactate / CI unchange but patient status tenous  -trial Williams / optiflow to optimize RV  -low threshold for IABP / reintubation if needed    Acute on chronic systolic biV failure:   -furosemide gtt, add dose of chlorthiazide    Nausea / emesis: c/w mesenteric congestion  -metoclopramide scheduled + diphenhydramine for antinausea    Sinus arrhythmia, threatened atrial fibrillation: amiodarone per CT surgery      IF Critical Care Time:    Critical Care Time: The patient is critically ill with:   circulatory dysfunction / failure  (Please correlate with diagnoses listed in the Summary of Plan section at the top of the note.)    This level of care requires me to provide interventions including:  advanced hemodynamic monitoring with isolated availability to critical care patients  frequent vasoactive (intravenous/inhaled) agent adjustments.    I have completed high complexity data collection, including review multiple databases (the patient's computer based laboratory data, point of care laboratory data, radiologic imaging, cardiology reports, microbiology reports, active medication lists and cross referenced this to their prior to admission medication lists, and reviewed active infusing medications both recorded electronically and directly at the bedside.) . Also, today I have monitored the patient's physiologic data both at the bedside and through telemetric means in my office. I have cross referenced the information between the computer record and direct reports from other providers (including Consultants, Registered Nurses, and Therapists) with my own personal evaluation and physical exam of the patient (see subjective/objective assessment). In providing critical care for this patient today, this means I have done these evaluations at multiple points of time today. If appropriate, I have used bedside ultrasound to directly evaluate the patient's medical status.  Also, I have re-evaluated their diagnostic data and performed frequent therapeutic monitoring on multiple occassions today in order to optimize care and avoid medical errors. In providing care, I have engaged in high complexity decision making to optimize the patient's health care needs.  As needed, I communicated these health care issues with the patient and / or the patient's representatives. Furthermore, I have compared the health care needs of this individual patient in context with available health care resources to them and balanced these issues.    The care described here was provided by me.      Critical Care Time: At least 37 minutes, excluding procedures       IF Evaluation and Management:  Daily History of Present Illness:(high: 4 of 3 conditions; moderate: 3)  Pain controlled and Reports Nausea   Daily Review of Systems: ( high: 2; moderate:1) Respiratory:  negative  Cardiovascular:  negative  Gastrointestinal:  negative       Physical Exam:( high: 12 - 4 = 8 elements; moderate: 6-4=2 )   General appearance: alert and mild distress  Heart: regular rate and rhythm, S1, S2 normal, no murmur, click, rub or gallop  Neurologic: Mental status: Alert, oriented, thought content appropriate  Motor: moves 4 ext.       Reviewed Data Points:(4;3) Allergies, Medications, Labs, Imaging, and Physician and Nursing Notes   Diagnoses: (4;3) Reviewed  Patient Active Problem List   Diagnosis    Hypercholesteremia    Glaucoma    Gastroesophageal reflux disease with esophagitis    Essential hypertension    Hyperglycemia    History of stroke with current residual effects    S/P CABG x 3    S/P left atrial appendage ligation      Risk: high       Ned Díaz MD  5/2/2025         distended

## 2025-05-23 NOTE — DISCHARGE NOTE ADULT - CLICK TO LAUNCH ORM
RN spoke to Jayla regarding the below. RN let Jayla know that form was faxed to [facility]. Jayla verbalized understanding.   .

## 2025-06-16 NOTE — PATIENT PROFILE ADULT - FOOD INSECURITY
Patient : Diana Wen Age: 39 year old Sex: female   MRN: [id number] Encounter Date: 6/16/2025      History     Chief Complaint   Patient presents with    Urinary Complaint    Back Pain    Abdominal Pain           HPI  Is a 39-year-old female coming in with chief complaint of dysuria and urinary frequency for 7 days.  The patient had previously been on Augmentin for 7-day course starting 5/21/2025 through 5/28/2025.  The patient also states that she has had some left flank pain for the past 2 days.  She vomited 1 time last night no vomiting today.  Did eat food earlier today.  The patient also endorses some mild abdominal cramping for the past 2 days, mainly on the left lower abdomen.  No diarrhea.  Patient also endorses small amount of white vaginal discharge.  Not concern for sexually transmitted infection.  The patient denies fever and chills.  Allergies   Allergen Reactions    Bee Venom ANAPHYLAXIS and SWELLING    Amitriptyline DIZZINESS    Benadryl Allergy VOMITING and Other (See Comments)     dizzy    Trazodone VOMITING and Other (See Comments)     dizzy       Current Discharge Medication List        Prior to Admission Medications    Details   ALPRAZolam (XANAX) 0.25 MG tablet Take 1 tablet by mouth 3 times daily as needed for Anxiety.  Qty: 20 tablet, Refills: 0      benzonatate (TESSALON PERLES) 200 MG capsule Take 1 capsule by mouth 3 times daily as needed for Cough. Do not bite or chew capsule  Qty: 20 capsule, Refills: 0      rimegepant sulfate (NURTEC ODT) 75 MG disintegrating tablet Take 75 mg by mouth daily as needed.      albuterol 108 (90 Base) MCG/ACT inhaler Inhale 2 puffs into the lungs every 4 hours as needed for Wheezing.  Qty: 8.5 g, Refills: 3      cyclobenzaprine (FLEXERIL) 10 MG tablet Take 1 tablet by mouth 3 times daily as needed for Muscle spasms.  Qty: 20 tablet, Refills: 0      diclofenac (CATAFLAM) 50 MG tablet Take 1 tablet by mouth as needed (headaches). Take 1 tab on onset of headaches  daily if needed. No more than 10 days per month.  Qty: 10 tablet, Refills: 0      acetaminophen (TYLENOL) 500 MG tablet Take 1,000 mg by mouth every 6 hours as needed for Pain.           New Prescriptions    Details   fluconazole (Diflucan) 150 MG tablet Take 1 tablet by mouth 1 time for 1 dose.  Qty: 1 tablet, Refills: 0             Past Medical History:   Diagnosis Date    Anxiety     treats with exercise    Endometrioma     History of insomnia     resolved per patient    Migraine     Ovarian cyst     Many ultrasounds and CT scans done in records [facility]     Unexplained weight loss 2016    per records obtained 3/2019    Upper back pain     Urinary tract infection     Vitamin D deficiency 2016       Past Surgical History:   Procedure Laterality Date    APPENDECTOMY      COLONOSCOPY  2016    Dr. Ortiz, normal TI    EXPLORATORY LAPAROTOMY W/ BOWEL RESECTION      Per records, eval for endometriosis which was negative.     HERNIA REPAIR  2021    OVARY SURGERY      Cyst Removal     TUBAL LIGATION      UMBILICAL HERNIA REPAIR N/A 07/10/2019    UPPER GASTROINTESTINAL ENDOSCOPY  2016    normal    VAGINAL HYSTERECTOMY  2023    Vaginal HYSTERECTOMY WITH excision of left fimbria       Family History   Problem Relation Age of Onset    Thyroid Mother         hyper    Thyroid Sister         Hypo    Thyroid Maternal Grandmother         hyper    Dementia/Alzheimers Paternal Grandmother     Stroke Paternal Grandmother     Hypertension Paternal Grandmother     Emphysema Paternal Grandfather     Heart disease Paternal Grandfather     Myocardial Infarction Paternal Grandfather     Thyroid Maternal Uncle         Hypo    Thyroid Other         Hypo       Social History     Tobacco Use    Smoking status: Former     Current packs/day: 0.00     Types: Cigarettes     Quit date: 2020     Years since quittin.3     Passive exposure: Never    Smokeless tobacco: Never   Vaping Use     Vaping status: Former   Substance Use Topics    Alcohol use: Yes     Alcohol/week: 1.0 - 2.0 standard drink of alcohol     Types: 1 - 2 Standard drinks or equivalent per week    Drug use: No     Comment: NONE        E-cigarette/Vaping    E-Cigarette/Vaping Use Former User      E-Cigarette/Vaping Substances & Devices    Nicotine No     THC No     CBD No     Flavoring No     Disposable No     Pre-filled or Refillable Cartridge No     Refillable Tank No     Pre-filled Pod No        Review of Systems  A total of 10 systems were reviewed and normal except for pertinent positives as mentioned in HPI.    Physical Exam     ED Triage Vitals [06/16/25 2503]   ED Triage Vitals Group      Temp 98.5 °F (36.9 °C)      Heart Rate 76      Resp 20      /79      SpO2 97 %      EtCO2 mmHg       Height       Weight 159 lb 3.2 oz (72.2 kg)      Weight Scale Used Standing scale      BMI (Calculated)       IBW/kg (Calculated)        Physical Exam    General: Well- nourished. Comfortable. Appears in no acute distress.  HEENT: Head is normocephalic, atraumatic. PERRL, Nose without congestion. oropharynx moist and without erythema. No tonsillar hypertrophy. External ears appear normal  Respiratory: CTA bilaterally, no wheezes, rales or rhochi. No retractions. No tachypnea  Cardiovascular: Regular rate and rhythm, no murmurs, rubs or gallops. No edema.   GI: soft, has left lower quadrant tenderness that is mild, no guarding.  No rebound. . No distention. No overt organomegaly.   Back: left sided CVA tenderness.  Patient has increase in pain when she moves rotationally to the right side.  Skin: no rashes or ecchymoses  Neuro: Alert  Psych: mood and affect are normal   ED Course     Procedures    Lab Results     Results for orders placed or performed during the hospital encounter of 06/16/25   Urinalysis & Reflex Microscopy With Culture If Indicated    Specimen: Urine clean catch   Result Value Ref Range    COLOR, URINALYSIS Yellow      APPEARANCE, URINALYSIS Clear     GLUCOSE, URINALYSIS Negative Negative mg/dL    BILIRUBIN, URINALYSIS Negative Negative    KETONES, URINALYSIS Trace (A) Negative mg/dL    SPECIFIC GRAVITY, URINALYSIS >=1.030 1.005 - 1.030    OCCULT BLOOD, URINALYSIS Small (A) Negative    PH, URINALYSIS 6.0 5.0 - 7.0    PROTEIN, URINALYSIS Trace (A) Negative mg/dL    UROBILINOGEN, URINALYSIS 0.2 0.2, 1.0 mg/dL    NITRITE, URINALYSIS Negative Negative    LEUKOCYTE ESTERASE, URINALYSIS Negative Negative    SQUAMOUS EPITHELIAL, URINALYSIS 11 to 25 (A) None Seen, 1 to 5 /hpf    ERYTHROCYTES, URINALYSIS 1 to 2 None Seen, 1 to 2 /hpf    LEUKOCYTES, URINALYSIS 1 to 5 None Seen, 1 to 5 /hpf    BACTERIA, URINALYSIS None Seen None Seen /hpf    HYALINE CASTS, URINALYSIS None Seen None Seen, 1 to 5 /lpf    AMORPHOUS MATERIAL Present     MUCUS Present    WET MOUNT    Specimen: Vagina; Swab   Result Value Ref Range    CLUE CELLS, WET MOUNT None Seen None Seen    TRICHOMONAS, WET MOUNT None Seen None Seen    YEAST, WET MOUNT None Seen None Seen           Radiology Results     Imaging Results              CT ABDOMEN PELVIS FOR KIDNEY STONES WO CONTRAST (In process)                     ED Medication Orders (From admission, onward)      Ordered Start     Status Ordering Provider    06/16/25 1847 06/16/25 1848  ketorolac (TORADOL) injection 30 mg  ONCE         Last MAR action: Given NOEMI BERUMEN                 Medical Decision Making  Differential for this 39-year-old female with left lower quadrant pain and left flank pain with dysuria and urinary frequency was possible pyelonephritis, possible nephrolithiasis with pyelonephritis, musculoskeletal strain, diverticulitis.  The patient does have some reproducible pain with movement rotationally to the right.  I suspect this is more musculoskeletal in nature.  The patient will be treated with muscle relaxant as well as anti-inflammatory.  Patient states that she has ibuprofen at home that she can take  and I have given her the dosing recommendation of 800 mg every 8 hours for pain.  The patient also does have Flexeril at home.  She will take this.  She has a PCP with whom she can follow-up.  Her specific gravity is slightly high and I have asked her to drink more fluids.  The patient is currently not endorsing any nausea she only had 1 episode of vomiting.  Perhaps the pain was intense and caused her to vomit.  The patient has stable vital signs and is afebrile.  She received Toradol for her discomfort.    Clinical Impression     ED Diagnosis   1. Left flank pain        2. Urinary frequency        3. Dysuria        4. Vaginal discharge            Disposition        Discharge 6/16/2025  6:53 PM  Diana Wen discharge to home/self care.             Judi Mills MD  06/16/25 2852     no

## 2025-06-19 NOTE — ED ADULT NURSE NOTE - ED STAT RN HAND OFF
You are seen in the emergency room today for shortness of breath.  I suspect you likely have a COPD exacerbation as you have wheezing on exam.  You not appear to be fluid overloaded on your physical exam, or your chest x-ray.  I wrote you for some antibiotics and steroids to go home with.  I would recommend using your albuterol inhaler every 4 hours while awake for the next week.  If you start having issues with worsening fevers, chills, chest pain, shortness of breath, leg swelling, weight gain, please return to the emergency room  
Handoff